# Patient Record
Sex: FEMALE | Race: BLACK OR AFRICAN AMERICAN | NOT HISPANIC OR LATINO | Employment: OTHER | URBAN - METROPOLITAN AREA
[De-identification: names, ages, dates, MRNs, and addresses within clinical notes are randomized per-mention and may not be internally consistent; named-entity substitution may affect disease eponyms.]

---

## 2018-04-09 ENCOUNTER — TELEPHONE (OUTPATIENT)
Dept: SLEEP MEDICINE | Facility: CLINIC | Age: 68
End: 2018-04-09

## 2018-04-09 ENCOUNTER — OFFICE VISIT (OUTPATIENT)
Dept: SLEEP MEDICINE | Facility: CLINIC | Age: 68
End: 2018-04-09
Payer: MEDICARE

## 2018-04-09 ENCOUNTER — OFFICE VISIT (OUTPATIENT)
Dept: PAIN MEDICINE | Facility: CLINIC | Age: 68
End: 2018-04-09
Attending: ANESTHESIOLOGY
Payer: MEDICARE

## 2018-04-09 VITALS
BODY MASS INDEX: 47.09 KG/M2 | DIASTOLIC BLOOD PRESSURE: 83 MMHG | SYSTOLIC BLOOD PRESSURE: 141 MMHG | WEIGHT: 293 LBS | HEART RATE: 81 BPM | HEIGHT: 66 IN | TEMPERATURE: 98 F

## 2018-04-09 VITALS
BODY MASS INDEX: 47.09 KG/M2 | HEIGHT: 66 IN | DIASTOLIC BLOOD PRESSURE: 61 MMHG | HEART RATE: 77 BPM | WEIGHT: 293 LBS | SYSTOLIC BLOOD PRESSURE: 110 MMHG

## 2018-04-09 DIAGNOSIS — G89.4 CHRONIC PAIN DISORDER: ICD-10-CM

## 2018-04-09 DIAGNOSIS — F17.200 CURRENT EVERY DAY SMOKER: ICD-10-CM

## 2018-04-09 DIAGNOSIS — G47.33 OBSTRUCTIVE SLEEP APNEA: Primary | ICD-10-CM

## 2018-04-09 DIAGNOSIS — M25.562 CHRONIC PAIN OF LEFT KNEE: Primary | ICD-10-CM

## 2018-04-09 DIAGNOSIS — F11.20 CHRONIC NARCOTIC DEPENDENCE: ICD-10-CM

## 2018-04-09 DIAGNOSIS — E66.01 MORBID OBESITY WITH BMI OF 50.0-59.9, ADULT: ICD-10-CM

## 2018-04-09 DIAGNOSIS — G89.29 CHRONIC PAIN OF LEFT KNEE: Primary | ICD-10-CM

## 2018-04-09 PROCEDURE — 99203 OFFICE O/P NEW LOW 30 MIN: CPT | Mod: S$GLB,,, | Performed by: NURSE PRACTITIONER

## 2018-04-09 PROCEDURE — 99204 OFFICE O/P NEW MOD 45 MIN: CPT | Mod: S$GLB,,, | Performed by: ANESTHESIOLOGY

## 2018-04-09 PROCEDURE — 99999 PR PBB SHADOW E&M-EST. PATIENT-LVL III: CPT | Mod: PBBFAC,,, | Performed by: ANESTHESIOLOGY

## 2018-04-09 PROCEDURE — 99999 PR PBB SHADOW E&M-NEW PATIENT-LVL III: CPT | Mod: PBBFAC,,, | Performed by: NURSE PRACTITIONER

## 2018-04-09 RX ORDER — AMLODIPINE BESYLATE 5 MG/1
10 TABLET ORAL DAILY
COMMUNITY
Start: 2018-03-26 | End: 2019-12-10

## 2018-04-09 RX ORDER — DICLOFENAC SODIUM 75 MG/1
TABLET, DELAYED RELEASE ORAL
COMMUNITY
Start: 2018-02-14 | End: 2018-05-31 | Stop reason: ALTCHOICE

## 2018-04-09 RX ORDER — GABAPENTIN 300 MG/1
300 CAPSULE ORAL 2 TIMES DAILY
COMMUNITY
Start: 2018-03-27 | End: 2019-09-04 | Stop reason: SDUPTHER

## 2018-04-09 RX ORDER — LISINOPRIL 40 MG/1
40 TABLET ORAL DAILY
COMMUNITY
Start: 2018-03-26 | End: 2019-12-10 | Stop reason: SDUPTHER

## 2018-04-09 RX ORDER — OXYCODONE AND ACETAMINOPHEN 5; 325 MG/1; MG/1
TABLET ORAL
COMMUNITY
Start: 2018-03-08 | End: 2018-12-10 | Stop reason: SDUPTHER

## 2018-04-09 RX ORDER — B-COMPLEX WITH VITAMIN C
1 TABLET ORAL DAILY
COMMUNITY
Start: 2018-02-28

## 2018-04-09 RX ORDER — OMEPRAZOLE 40 MG/1
40 CAPSULE, DELAYED RELEASE ORAL
COMMUNITY
Start: 2018-02-21 | End: 2019-04-29 | Stop reason: ALTCHOICE

## 2018-04-09 RX ORDER — SODIUM, POTASSIUM,MAG SULFATES 17.5-3.13G
SOLUTION, RECONSTITUTED, ORAL ORAL
COMMUNITY
Start: 2018-02-23 | End: 2018-05-31

## 2018-04-09 NOTE — LETTER
April 9, 2018      Shai Jacobo, PADMINI  [address]  [address]  [facility] [address]           [facility]  [address]  [facility] [address]  Phone: [phone]  Fax: [phone]          Patient: Ca Coats   MR Number: [id number]   YOB: 1950   Date of Visit: 4/9/2018       Dear Shai Jacobo:    Thank you for referring Ca Coats to me for evaluation. Attached you will find relevant portions of my assessment and plan of care.    If you have questions, please do not hesitate to call me. I look forward to following Ca Coats along with you.    Sincerely,    Samantha Vidal MD    Enclosure  CC:  No Recipients    If you would like to receive this communication electronically, please contact [email or web address] or [phone] to request more information on [email or web address] Link access.    For providers and/or their staff who would like to refer a patient to [facility], please contact us through our one-stop-shop provider referral line, [facility], at [phone].    If you feel you have received this communication in error or would no longer like to receive these types of communications, please e-mail [email or web address]

## 2018-04-09 NOTE — LETTER
April 9, 2018      Hannah Meier MD  [address]  5th [address]  [facility] [address]           [facility]  [address]  [facility] [address]  Phone: [phone]          Patient: Ca Coats   MR Number: [id number]   YOB: 1950   Date of Visit: 4/9/2018       Dear Dr. Hannah Meier:    Thank you for referring Ca Coats to me for evaluation. Attached you will find relevant portions of my assessment and plan of care.    If you have questions, please do not hesitate to call me. I look forward to following Ca Coats along with you.    Sincerely,    Shai Jacobo, PADMINI    Enclosure  CC:  No Recipients    If you would like to receive this communication electronically, please contact [email or web address] or [phone] to request more information on [employer] Link access.    For providers and/or their staff who would like to refer a patient to [facility], please contact us through our one-stop-shop provider referral line, [facility], at [phone].    If you feel you have received this communication in error or would no longer like to receive these types of communications, please e-mail [email or web address]

## 2018-04-09 NOTE — PROGRESS NOTES
"Ca Coats  was seen as a new patient at the request of  Hannah Meier MD for the management of obstructive sleep apnea.    CHIEF COMPLAINT:    Chief Complaint   Patient presents with    Sleep Apnea       04/09/2018 PEGGY Jacobo NP: Initial HISTORY OF PRESENT ILLNESS: Ca Coats is a 67 y.o. female is here for sleep evaluation.       Previous [facility] patient. Here to establish care and CPAP compliance check after set up of new machine January 2018  No sleep records for my review today    Patient complaints include: interrupted sleep and snoring, resolved with CPAP use. Although still having a little of sleep interruption.   Nocturia x1   Some ongoing fatigue, with known anemia being managed by PCP, due for f/u 4/18/18    Takes Neurontin for chronic leg pain    CPAP Interrogation: Resmed [id number], CPAP 13 cm, Days Used: 30/30, Days > 4 hours: 29/30, Average Usage: 5.8 hours, Used Hours: 174.3, Leak 12L/min, Predicted AHI: 0.2, Machine condition: good     Denies oral/nasal drying with Resmed AirFit F20. Humidity 8/8. Reports dry lips with CPAP use.   Denies pressure intolerance    Denies symptoms of restless legs or kicking during sleep.    Occupation: retired     Muir Sleepiness Scale score during initial sleep evaluation was 15.    SLEEP ROUTINE:      Bed partner:  none  Time to bed:  10-10:30 am  Sleep onset latency: "long time"     Disruptions or awakenings: 3 or more     Wakeup time:   6 am  Perceived sleep quality:  poor        PAST MEDICAL HISTORY:    Active Ambulatory Problems     Diagnosis Date Noted    No Active Ambulatory Problems     Resolved Ambulatory Problems     Diagnosis Date Noted    No Resolved Ambulatory Problems     Past Medical History:   Diagnosis Date    GERD (gastroesophageal reflux disease)     Hypertension                 PAST SURGICAL HISTORY:    Past Surgical History:   Procedure Laterality Date    APPENDECTOMY      cataract surgery   2017 " "        FAMILY HISTORY:                Family History   Problem Relation Age of Onset    No Known Problems Mother     No Known Problems Father        SOCIAL HISTORY:          Tobacco:   History   Smoking Status    Current Every Day Smoker    Types: Cigarettes   Smokeless Tobacco    Never Used       Alcohol use:    History   Alcohol Use    Yes     Comment: rarely                  ALLERGIES:  Review of patient's allergies indicates:  No Known Allergies    CURRENT MEDICATIONS:    Current Outpatient Prescriptions   Medication Sig Dispense Refill    amLODIPine (NORVASC) 5 MG tablet       B-complex with vitamin C (Z-BEC OR EQUIV) tablet       diclofenac (VOLTAREN) 75 MG EC tablet       gabapentin (NEURONTIN) 300 MG capsule       lisinopril (PRINIVIL,ZESTRIL) 40 MG tablet       multivitamin with minerals tablet       omeprazole (PRILOSEC) 40 MG capsule       oxyCODONE-acetaminophen (PERCOCET) 5-325 mg per tablet       SUPREP BOWEL PREP KIT 17.5-3.13-1.6 gram SolR        No current facility-administered medications for this visit.                   REVIEW OF SYSTEMS:     Sleep related symptoms as per HPI.  CONST:Denies weight gain    HEENT: Denies sinus congestion  PULM: Sometimes dyspnea  CARD:  Denies palpitations   GI:  Reports acid reflux  : Denies polyuria  NEURO: Denies headaches  PSYCH: Sometimes mood disturbance  HEME: Denies anemia    Otherwise, a balance of systems reviewed is negative.          PHYSICAL EXAM:  Vitals:    04/09/18 0829   BP: 110/61   Pulse: 77   Weight: (!) 159.8 kg (352 lb 4.7 oz)   Height: 5' 6" (1.676 m)   PainSc: 10-Worst pain ever   PainLoc: Knee     Body mass index is 56.86 kg/m².     GENERAL: Obese body habitus, well groomed  NECK: Supple. No thyromegaly. No palpable nodes.    SKIN: On face and neck: No abrasions, no rashes, no lesions.  No subcutaneous nodules are palpable.  RESPIRATORY:  Normal chest expansion and non-labored breathing at rest.  CARDIOVASCULAR: Normal " rate  EXTREMITIES: No edema. No clubbing. No cyanosis. Station normal. Gait normal.        NEURO/PSYCH: Oriented to time, place and person. Normal attention span and concentration. Affect is full. Mood is normal.                                              ASSESSMENT:    Obstructive sleep apnea, previously diagnosed, severity unknown. The patient symptomatically has snoring and interrupted sleep resolved with CPAP. The patient is adherent on CPAP and experiencing symptomatic benefit. Medical co-morbidities: systemic HTN, GERD, and obesity.  This warrants treatment for obstructive sleep apnea.      Fatigue, currently on iron supplements for known anemia managed by [facility] PCP    Current every day smoker, currently smoking up to 4 cigarettes daily which is decreased from prior never more than a whole pack    PLAN:    Treatment:    -GEO signed to get [facility] sleep records  -Continue CPAP 13 cm. Rx for heated coils to [facility]  -Referral to Smoking Cessation Program    Education: During our discussion today, we talked about the etiology of obstructive sleep apnea as well as the potential ramifications of untreated sleep apnea, which could include daytime sleepiness, hypertension, heart disease and/or stroke. We discussed potential treatment options, which could include weight loss, body positioning, continuous positive airway pressure (CPAP), OA, EPAP, or referral for surgical consideration.      Precautions: The patient was advised to abstain from driving should they feel sleepy  or drowsy - pt does not drive     Thank you for allowing me the opportunity to participate in the care of your patient.    ADDENDUM: Received external sleep records 04/09/2018 at 1:47, handed to me by Vandana Kenny MA  02/01/2013 PSG AHI 9.2 with oxygen sathish 84%.   03/29/2013 Titratrion study CPAP 13 cm

## 2018-04-09 NOTE — PROGRESS NOTES
"Subjective:     Patient ID: Ca Coats is a 67 y.o. female.    Chief Complaint: Pain    Consulted by: Self      Disclaimer: This note was generated using voice recognition software.  There may be a typographical errors that were missed during proofreading.      HPI:    Ca Coats is a 67 y.o. female who presents today with left knee pain. Her pain has been going on for "too long."  She was previously treated by Dr. Iyer at [facility].  She has injections [id number] with him.  These were helpful, but she does not know if the injections were steroid or viscosupplementation.   This pain is described in detail below.    Physical Therapy: Yes, she did this in 2018 for one month (twice a week). She does HEP (stretching and ROM in the morning)    Non-pharmacologic Treatment:     · Ice/Heat: Heat is more helpful than ice  · TENS: Not tried  · Massage: Not tried  · Chiropractic care: Not tried  · Acupuncture: Not tried  · Other: Uses a cane         Pain Medications:         · Currently taking: Gabapentin 300 mg/600 mg, Diclofenac 75 mg BID PRN, Percocet 5/325 mg BID PRN    · Has tried in the past:  Ibuprofen (still takes sometimes), Voltaren gel    · Has not tried: Tylenol, Muscle relaxants, TCAs, SNRIs, Lyrica, compound topical creams    Blood thinners: None    Interventional Therapies: [id number] injections by Dr. Iyer were helpful    Relevant Surgeries: None    Affecting sleep? No    Affecting daily activities? Yes    Depressive symptoms? No          · SI/HI? No    Work status: No, on disability due to her knee    Prescription Monitoring Program database:  Reviewed and consistent with medication use as prescribed.    Pain Scales  Best: 3/10  Worst: 10/10  Usually: 3/10  Today: 8/10    Knee Pain    The pain is present in the left knee. This is a chronic problem. The current episode started more than 1 year ago. The problem occurs daily. The problem has been gradually worsening. The quality of " the pain is described as aching, burning, sharp, tight and numb. The pain is at a severity of 8/10. Associated symptoms include an inability to bear weight. The symptoms are aggravated by walking and standing. She has tried injection treatment, exercise, cold, heat, movement and oral narcotics for the symptoms. The treatment provided mild relief. Physical therapy was effective.      Last 3 PDI Scores 4/9/2018   Pain Disability Index (PDI) 34   ]    Review of Systems   Musculoskeletal: Positive for joint pain.        Left knee pain             Past Medical History:   Diagnosis Date    GERD (gastroesophageal reflux disease)     Hypertension        Past Surgical History:   Procedure Laterality Date    APPENDECTOMY      cataract surgery   2017       Review of patient's allergies indicates:  No Known Allergies    Current Outpatient Prescriptions   Medication Sig Dispense Refill    amLODIPine (NORVASC) 5 MG tablet       B-complex with vitamin C (Z-BEC OR EQUIV) tablet       diclofenac (VOLTAREN) 75 MG EC tablet       gabapentin (NEURONTIN) 300 MG capsule       lisinopril (PRINIVIL,ZESTRIL) 40 MG tablet       multivitamin with minerals tablet       omeprazole (PRILOSEC) 40 MG capsule       oxyCODONE-acetaminophen (PERCOCET) 5-325 mg per tablet       SUPREP BOWEL PREP KIT 17.5-3.13-1.6 gram SolR        No current facility-administered medications for this visit.        Family History   Problem Relation Age of Onset    No Known Problems Mother     No Known Problems Father        Social History     Social History    Marital status: Single     Spouse name: N/A    Number of children: N/A    Years of education: N/A     Occupational History    Not on file.     Social History Main Topics    Smoking status: Current Every Day Smoker     Types: Cigarettes    Smokeless tobacco: Never Used    Alcohol use Yes      Comment: rarely     Drug use: Unknown    Sexual activity: Not on file     Other Topics Concern     "Not on file     Social History Narrative    No narrative on file       Objective:     Vitals:    04/09/18 0940   BP: (!) 141/83   Pulse: 81   Temp: 97.7 °F (36.5 °C)   Weight: (!) 159.6 kg (351 lb 13.7 oz)   Height: 5' 6" (1.676 m)   PainSc:   8       GEN:  Well developed, well nourished.  No acute distress. No pain behavior.  HEENT:  No trauma.  Mucous membranes moist.  Nares patent bilaterally.  PSYCH: Normal affect. Thought content appropriate.  CHEST:  Breathing symmetric.  No audible wheezing.  ABD:   · Soft, non-distended.    SKIN:  Warm, pink, dry.  No rash on exposed areas.    EXT:  No cyanosis, clubbing, or edema.  No color change or changes in nail or hair growth.  NEURO/MUSCULOSKELETAL:  Fully alert, oriented, and appropriate. Speech normal francesca. No cranial nerve deficits.   Gait: Normal.     Motor Strength: 5/5 motor strength throughout lower extremities.     Right knee  Inspection: No erythema, swelling, or redness  ROM: Full  Palpation: No pes anserine tenderness and No crepitus. No patellar tendon tenderness and No patellofemoral tendon tenderness.  No instability on exam.    Left knee  Inspection: No erythema, swelling, or redness  ROM: Full  Palpation: Positive pes anserine tenderness and Positive crepitus. Positive patellar tendon tenderness and Positive patellofemoral tendon tenderness.  N instability on exam.      Imaging:      No relevant imaging available for review      Assessment:     Encounter Diagnoses   Name Primary?    Chronic pain of left knee Yes    Chronic pain disorder     Morbid obesity with BMI of 50.0-59.9, adult     Chronic narcotic dependence        Plan:     Ca was seen today for knee pain.    Diagnoses and all orders for this visit:    Chronic pain of left knee    Chronic pain disorder    Morbid obesity with BMI of 50.0-59.9, adult    Chronic narcotic dependence       Her pain is consistent with the above.    We discussed the assessment and recommendations.  All " available images were reviewed. We discussed the disease process, prognosis, treatment plan, and risks and benefits. The patient is aware of the risks and benefits of the medications being prescribed, common side effects, and proper usage. The following is the plan we agreed on:     1. GEO from [facility] for MRI and previous procedure notes  2. Will plan to repeat the injection that Dr. Iyer did as a first step if there is no ligamentous injury on MRI  1. Can consider RFA  3. Will reevaluate medication regimen at [facility].  Until then, prescriptive authority of all medications should remain with her PCP.  4. RTC in 2 weeks or sooner if needed.    Thank you for allowing me to participate in the care of this patient.   Please do not hesitate to call me at [phone] with any questions or concerns.    Greater than 25 minutes spent in total in todays visit with the patient, with more than half that time direct face to face counseling and education with the patient today. We discussed the disease process, prognosis, treatment plan, and risks and benefits.    The above plan and management options were discussed at length with patient. Patient is in agreement with the above and verbalized understanding. It will be communicated with the referring physician via electronic record, fax, or mail.

## 2018-04-30 ENCOUNTER — CLINICAL SUPPORT (OUTPATIENT)
Dept: SMOKING CESSATION | Facility: CLINIC | Age: 68
End: 2018-04-30
Payer: COMMERCIAL

## 2018-04-30 ENCOUNTER — OFFICE VISIT (OUTPATIENT)
Dept: PAIN MEDICINE | Facility: CLINIC | Age: 68
End: 2018-04-30
Payer: MEDICARE

## 2018-04-30 VITALS
SYSTOLIC BLOOD PRESSURE: 155 MMHG | DIASTOLIC BLOOD PRESSURE: 79 MMHG | RESPIRATION RATE: 18 BRPM | TEMPERATURE: 99 F | BODY MASS INDEX: 47.09 KG/M2 | WEIGHT: 293 LBS | HEIGHT: 66 IN | HEART RATE: 91 BPM

## 2018-04-30 DIAGNOSIS — S83.512A RUPTURE OF ANTERIOR CRUCIATE LIGAMENT OF LEFT KNEE, INITIAL ENCOUNTER: ICD-10-CM

## 2018-04-30 DIAGNOSIS — M17.12 PRIMARY OSTEOARTHRITIS OF LEFT KNEE: ICD-10-CM

## 2018-04-30 DIAGNOSIS — M25.562 CHRONIC PAIN OF LEFT KNEE: Primary | ICD-10-CM

## 2018-04-30 DIAGNOSIS — F17.210 SMOKING 1/2 PACK A DAY OR LESS: Primary | ICD-10-CM

## 2018-04-30 DIAGNOSIS — G89.29 CHRONIC PAIN OF LEFT KNEE: Primary | ICD-10-CM

## 2018-04-30 PROCEDURE — 99404 PREV MED CNSL INDIV APPRX 60: CPT | Mod: S$GLB,,,

## 2018-04-30 PROCEDURE — 99213 OFFICE O/P EST LOW 20 MIN: CPT | Mod: S$GLB,,, | Performed by: NURSE PRACTITIONER

## 2018-04-30 PROCEDURE — 99999 PR PBB SHADOW E&M-EST. PATIENT-LVL I: CPT | Mod: PBBFAC,,,

## 2018-04-30 PROCEDURE — 99999 PR PBB SHADOW E&M-EST. PATIENT-LVL IV: CPT | Mod: PBBFAC,,, | Performed by: NURSE PRACTITIONER

## 2018-04-30 RX ORDER — IBUPROFEN 200 MG
1 TABLET ORAL DAILY
Refills: 0 | COMMUNITY
Start: 2018-04-30 | End: 2018-04-30 | Stop reason: SDUPTHER

## 2018-04-30 RX ORDER — IBUPROFEN 200 MG
1 TABLET ORAL DAILY
Qty: 14 PATCH | Refills: 0 | Status: SHIPPED | OUTPATIENT
Start: 2018-04-30 | End: 2018-05-10 | Stop reason: SDUPTHER

## 2018-04-30 NOTE — PROGRESS NOTES
"Subjective:     Patient ID: Ca Coats is a 67 y.o. female.    Chief Complaint: Pain    Consulted by: Self      Disclaimer: This note was generated using voice recognition software.  There may be a typographical errors that were missed during proofreading.      HPI:    Ca Coats is a 67 y.o. female who presents today with left knee pain. Her pain has been going on for "too long."  She was previously treated by Dr. Iyer at [facility].  She has injections [id number] with him.  These were helpful, but she does not know if the injections were steroid or viscosupplementation.   This pain is described in detail below.    Interval History (4/30/2018):  The patient returns to clinic today for follow up and records review. We did received records from [facility] including a MRI of her knee. Dr. Iyer's last office visit note from January 2017 does not include any previous injections. She continues to report bilateral knee pain, left greater than right. She describes this pain as constant and aching. This pain is worse with prolonged walking. She also report right shoulder pain with prolonged overhead activity. She continues to take Percocet with benefit. She denies any other health changes.     Physical Therapy: Yes, she did this in 2018 for one month (twice a week). She does HEP (stretching and ROM in the morning)    Non-pharmacologic Treatment:     · Ice/Heat: Heat is more helpful than ice  · TENS: Not tried  · Massage: Not tried  · Chiropractic care: Not tried  · Acupuncture: Not tried  · Other: Uses a cane         Pain Medications:         · Currently taking: Gabapentin 300 mg/600 mg, Diclofenac 75 mg BID PRN, Percocet 5/325 mg BID PRN    · Has tried in the past:  Ibuprofen (still takes sometimes), Voltaren gel    · Has not tried: Tylenol, Muscle relaxants, TCAs, SNRIs, Lyrica, compound topical creams    Blood thinners: None    Interventional Therapies: [id number] injections by Dr. Iyer " were helpful    Relevant Surgeries: None    Affecting sleep? No    Affecting daily activities? Yes    Depressive symptoms? No          · SI/HI? No    Work status: No, on disability due to her knee    Prescription Monitoring Program database:  Reviewed and consistent with medication use as prescribed.    Pain Scales  Best: 3/10  Worst: 10/10  Usually: 3/10  Today: 7/10    Knee Pain    The pain is present in the left knee. This is a chronic problem. The current episode started more than 1 year ago. The problem occurs daily. The problem has been gradually worsening. The quality of the pain is described as aching, burning, sharp, tight and numb. The pain is at a severity of 8/10. Associated symptoms include an inability to bear weight. The symptoms are aggravated by walking and standing. She has tried injection treatment, exercise, cold, heat, movement and oral narcotics for the symptoms. The treatment provided mild relief. Physical therapy was effective.      Last 3 PDI Scores 4/23/2018 4/9/2018   Pain Disability Index (PDI) 0 34   ]    Review of Systems   Musculoskeletal: Positive for joint pain.        Left knee pain             Past Medical History:   Diagnosis Date    GERD (gastroesophageal reflux disease)     Hypertension        Past Surgical History:   Procedure Laterality Date    APPENDECTOMY      cataract surgery   2017       Review of patient's allergies indicates:  No Known Allergies    Current Outpatient Prescriptions   Medication Sig Dispense Refill    amLODIPine (NORVASC) 5 MG tablet       B-complex with vitamin C (Z-BEC OR EQUIV) tablet       diclofenac (VOLTAREN) 75 MG EC tablet       gabapentin (NEURONTIN) 300 MG capsule       lisinopril (PRINIVIL,ZESTRIL) 40 MG tablet       multivitamin with minerals tablet       omeprazole (PRILOSEC) 40 MG capsule       oxyCODONE-acetaminophen (PERCOCET) 5-325 mg per tablet       SUPREP BOWEL PREP KIT 17.5-3.13-1.6 gram SolR        No current  "facility-administered medications for this visit.        Family History   Problem Relation Age of Onset    No Known Problems Mother     No Known Problems Father        Social History     Social History    Marital status: Single     Spouse name: N/A    Number of children: N/A    Years of education: N/A     Occupational History    Not on file.     Social History Main Topics    Smoking status: Current Every Day Smoker     Types: Cigarettes    Smokeless tobacco: Never Used    Alcohol use Yes      Comment: rarely     Drug use: Unknown    Sexual activity: Not on file     Other Topics Concern    Not on file     Social History Narrative    No narrative on file       Objective:     Vitals:    04/30/18 0954   BP: (!) 155/79   Pulse: 91   Resp: 18   Temp: 98.8 °F (37.1 °C)   Weight: (!) 160 kg (352 lb 11.8 oz)   Height: 5' 6" (1.676 m)   PainSc:   7   PainLoc: Shoulder       GEN:  Well developed, well nourished.  No acute distress. No pain behavior.  HEENT:  No trauma.  Mucous membranes moist.  Nares patent bilaterally.  PSYCH: Normal affect. Thought content appropriate.  CHEST:  Breathing symmetric.  No audible wheezing.  ABD:   · Soft, non-distended.    SKIN:  Warm, pink, dry.  No rash on exposed areas.    EXT:  No cyanosis, clubbing, or edema.  No color change or changes in nail or hair growth.  NEURO/MUSCULOSKELETAL:  Fully alert, oriented, and appropriate. Speech normal francesca. No cranial nerve deficits.   Gait: Normal.     Motor Strength: 5/5 motor strength throughout lower extremities.     Right knee  Inspection: No erythema, swelling, or redness  ROM: Full with pain on extension.   Palpation: No pes anserine tenderness and No crepitus. No patellar tendon tenderness and No patellofemoral tendon tenderness.  No instability on exam.    Left knee  Inspection: No erythema, swelling, or redness  ROM: Full with pain on extension.   Palpation: Positive pes anserine tenderness and Positive crepitus. Positive " patellar tendon tenderness and Positive patellofemoral tendon tenderness.  No instability on exam.      Imaging:      MRI Left Knee 7/21/2017 (in media):  The medial meniscus is intact. The lateral meniscus is intact.     A chronic complete ACL tear is noted. The posterior cruciate ligament is intact. The medial and lateral retinacula are intact. The medial collateral ligament is intact. The lateral collateral ligament in intact. The posterolateral corner structures are intact.     There is full thickness fissuring of the central aspect medial femoral cartilage. The lateral tibiofemoral compartment cartilage is intact. There is broad thickness defect within the central trochlear cartilage. There is mild lateral patellofemoral cartilage thinning and regional full thickness loss of the central superior patellar cartilage.     A small effusion is present. There is trace infrapatellar fluid. Tiny popliteal cyst is noted. Subtle focal marrow edema is seen within the posterior tibial plateau. The bone marrow signal is heterogeneous likely reflecting marrow reconversion.     There is trace pes anserine bursitis. The tendons are otherwise normal.     Grade 2 fatty streaking of the semimembranosus and vastus lateralis muscles are noted. There is mild prepatellar edema.     MRI Right Shoulder 7/21/2017 (in media):   There is a complete tear of the supraspinatus tendon with retraction to the glenohumeral joint. There is partial tearing of the anterior infraspinatus articular surface. Mild subscapularis tendinosis is noted. The teres minor is intact. A small amount of subacromial/subdeltoid fluid is noted. The proximal long head biceps tendon is poorly visualized proximally although intact distally.     Degenerative tearing of the anterosuperior through posterosuperior labrum is noted. There is moderate to severe superior glenoid cartilage thinning with subchondral degenerative changes. The glenohumeral ligaments appear grossly  intact.     An os acromiale is seen with fluid and osteophytosis at its junction with the base of the acromion. There is moderate superior subluxation of the AC joint with mild osteophytosis.     Small effusion with subcoracoid extension is noted.     There is grade 2/3 fatty atrophy of the supraspinatus muscle, grade 2 of the infraspinatus and subscapularis. Heterogeneous signal is seen throughout the bone marrow likely reflecting marrow reconversion.       Assessment:     Encounter Diagnoses   Name Primary?    Chronic pain of left knee Yes    Primary osteoarthritis of left knee     Rupture of anterior cruciate ligament of left knee, initial encounter        Plan:     Ca was seen today for shoulder pain.    Diagnoses and all orders for this visit:    Chronic pain of left knee  -     Ambulatory consult to Orthopedics    Primary osteoarthritis of left knee  -     Ambulatory consult to Orthopedics    Rupture of anterior cruciate ligament of left knee, initial encounter  -     Ambulatory consult to Orthopedics       Her pain is consistent with the above.    We discussed the assessment and recommendations.  All available images were reviewed. We discussed the disease process, prognosis, treatment plan, and risks and benefits. The patient is aware of the risks and benefits of the medications being prescribed, common side effects, and proper usage. The following is the plan we agreed on:     1. Schedule for left knee Synvisc One injections. The procedure, risks, benefits and options were discussed with patient. There are no contraindications to the procedure. The patient expressed understanding and agreed to proceed.    2. Consult orthopedics to discuss ACL tear.   3. Will reevaluate medication regimen at [facility].  Until then, prescriptive authority of all medications should remain with her PCP.  4. RTC in 2 weeks or sooner if needed.    - Dr. Vidal was consulted on the patient and agrees with this plan.    The above  plan and management options were discussed at length with patient. Patient is in agreement with the above and verbalized understanding.     Savanna Hyatt NP  04/30/2018

## 2018-04-30 NOTE — PROGRESS NOTES
Patient here for 1st quit attempt. States she smokes 6-7 cigarettes daily. Reviewed program goals. Due to limited transportation she agreed to receive call and schedule follow up appointments with scheduled clinic visits. Will begin the prescribed tobacco cessation medication regime of 14 mg nicotine patch. Educated patient how to use and side effects of medications.

## 2018-05-08 ENCOUNTER — SURGERY (OUTPATIENT)
Age: 68
End: 2018-05-08

## 2018-05-08 ENCOUNTER — HOSPITAL ENCOUNTER (OUTPATIENT)
Facility: OTHER | Age: 68
Discharge: HOME | End: 2018-05-08
Attending: ANESTHESIOLOGY | Admitting: ANESTHESIOLOGY
Payer: MEDICARE

## 2018-05-08 VITALS
HEIGHT: 66 IN | RESPIRATION RATE: 18 BRPM | DIASTOLIC BLOOD PRESSURE: 60 MMHG | BODY MASS INDEX: 47.09 KG/M2 | SYSTOLIC BLOOD PRESSURE: 105 MMHG | TEMPERATURE: 98 F | WEIGHT: 293 LBS | OXYGEN SATURATION: 95 % | HEART RATE: 76 BPM

## 2018-05-08 DIAGNOSIS — M17.12 LOCALIZED OSTEOARTHRITIS OF LEFT KNEE: Primary | ICD-10-CM

## 2018-05-08 DIAGNOSIS — G89.29 CHRONIC PAIN: ICD-10-CM

## 2018-05-08 PROCEDURE — 77002 NEEDLE LOCALIZATION BY XRAY: CPT | Performed by: ANESTHESIOLOGY

## 2018-05-08 PROCEDURE — 25000003 PHARM REV CODE 250: Performed by: ANESTHESIOLOGY

## 2018-05-08 PROCEDURE — 25500020 PHARM REV CODE 255: Performed by: ANESTHESIOLOGY

## 2018-05-08 PROCEDURE — 63600175 PHARM REV CODE 636 W HCPCS: Performed by: ANESTHESIOLOGY

## 2018-05-08 PROCEDURE — S0020 INJECTION, BUPIVICAINE HYDRO: HCPCS | Performed by: ANESTHESIOLOGY

## 2018-05-08 PROCEDURE — 77002 NEEDLE LOCALIZATION BY XRAY: CPT | Mod: 26,,, | Performed by: ANESTHESIOLOGY

## 2018-05-08 PROCEDURE — 20610 DRAIN/INJ JOINT/BURSA W/O US: CPT | Mod: LT,,, | Performed by: ANESTHESIOLOGY

## 2018-05-08 PROCEDURE — 20610 DRAIN/INJ JOINT/BURSA W/O US: CPT | Mod: LT | Performed by: ANESTHESIOLOGY

## 2018-05-08 RX ORDER — METHYLPREDNISOLONE ACETATE 40 MG/ML
INJECTION, SUSPENSION INTRA-ARTICULAR; INTRALESIONAL; INTRAMUSCULAR; SOFT TISSUE
Status: DISCONTINUED | OUTPATIENT
Start: 2018-05-08 | End: 2018-05-08 | Stop reason: HOSPADM

## 2018-05-08 RX ORDER — BUPIVACAINE HYDROCHLORIDE 5 MG/ML
INJECTION, SOLUTION EPIDURAL; INTRACAUDAL
Status: DISCONTINUED | OUTPATIENT
Start: 2018-05-08 | End: 2018-05-08 | Stop reason: HOSPADM

## 2018-05-08 RX ORDER — SODIUM CHLORIDE 9 MG/ML
500 INJECTION, SOLUTION INTRAVENOUS CONTINUOUS
Status: DISCONTINUED | OUTPATIENT
Start: 2018-05-08 | End: 2018-05-08 | Stop reason: HOSPADM

## 2018-05-08 RX ORDER — LIDOCAINE HYDROCHLORIDE 10 MG/ML
INJECTION INFILTRATION; PERINEURAL
Status: DISCONTINUED | OUTPATIENT
Start: 2018-05-08 | End: 2018-05-08 | Stop reason: HOSPADM

## 2018-05-08 RX ADMIN — METHYLPREDNISOLONE ACETATE 40 MG: 40 INJECTION, SUSPENSION INTRA-ARTICULAR; INTRALESIONAL; INTRAMUSCULAR; SOFT TISSUE at 03:05

## 2018-05-08 RX ADMIN — BUPIVACAINE HYDROCHLORIDE 10 ML: 5 INJECTION, SOLUTION EPIDURAL; INTRACAUDAL; PERINEURAL at 03:05

## 2018-05-08 RX ADMIN — IOHEXOL 5 ML: 300 INJECTION, SOLUTION INTRAVENOUS at 03:05

## 2018-05-08 RX ADMIN — LIDOCAINE HYDROCHLORIDE 10 ML: 10 INJECTION, SOLUTION INFILTRATION; PERINEURAL at 03:05

## 2018-05-08 NOTE — DISCHARGE INSTRUCTIONS
Thank you for allowing us to care for you today. You may receive a survey about the care we provided. Your feedback is valuable and helps us provide excellent care throughout the community. Home Care Instructions Pain Management:    1. DIET:   You may resume your normal diet today.   2. BATHING:   You may shower with luke warm water.  3. DRESSING:   You may remove your bandage today.   4. ACTIVITY LEVEL:   You may resume your normal activities 24 hrs after your procedure.  5. MEDICATIONS:   You may resume your normal medications today.   6. SPECIAL INSTRUCTIONS:   No heat to the injection site for 24 hrs including, bath or shower, heating pad, moist heat, or hot tubs.    Use ice pack to injection site for any pain or discomfort.  Apply ice packs for 20 minute intervals as needed.   If you have received any sedatives by mouth today you may not drive for 12 hours.    If you have received any sedation through your IV, you may not drive for 24 hrs.     PLEASE CALL YOUR DOCTOR IF:  1. Redness or swelling around the injection site.  2. Fever of 101 degrees  3. Drainage (pus) from the injection site.  4. For any continuous bleeding (some dried blood over the incision is normal.)    FOR EMERGENCIES:   If any unusual problems or difficulties occur during clinic hours, call [phone] or [phone].

## 2018-05-10 ENCOUNTER — CLINICAL SUPPORT (OUTPATIENT)
Dept: SMOKING CESSATION | Facility: CLINIC | Age: 68
End: 2018-05-10
Payer: COMMERCIAL

## 2018-05-10 DIAGNOSIS — F17.210 SMOKING 1/2 PACK A DAY OR LESS: ICD-10-CM

## 2018-05-10 PROCEDURE — 99407 BEHAV CHNG SMOKING > 10 MIN: CPT | Mod: S$GLB,,,

## 2018-05-10 PROCEDURE — 99999 PR PBB SHADOW E&M-EST. PATIENT-LVL I: CPT | Mod: PBBFAC,,,

## 2018-05-10 RX ORDER — IBUPROFEN 200 MG
1 TABLET ORAL DAILY
Qty: 14 PATCH | Refills: 0 | Status: SHIPPED | OUTPATIENT
Start: 2018-05-10 | End: 2018-05-31 | Stop reason: SDUPTHER

## 2018-05-10 NOTE — PROGRESS NOTES
Successful contact at [phone]. Following up on patient quit status. She states smoking 5 per day. Has reduced from 6-7 per day. Reviewed triggers,habits and strategies. Discussed learned addiction model and importance of setting quit date. Rate faded to 3 per day. She was hesitant of reducing because she is going to go out this weekend. Reviewed strategies to help her feel better about reducing. Says she will try. Refilled patches. Denies any abnormal behavioror mental changes at this time.

## 2018-05-17 ENCOUNTER — CLINICAL SUPPORT (OUTPATIENT)
Dept: SMOKING CESSATION | Facility: CLINIC | Age: 68
End: 2018-05-17
Payer: COMMERCIAL

## 2018-05-17 DIAGNOSIS — F17.210 LIGHT CIGARETTE SMOKER (1-9 CIGS/DAY): Primary | ICD-10-CM

## 2018-05-17 PROCEDURE — 99999 PR PBB SHADOW E&M-EST. PATIENT-LVL I: CPT | Mod: PBBFAC,,,

## 2018-05-17 PROCEDURE — 99407 BEHAV CHNG SMOKING > 10 MIN: CPT | Mod: S$GLB,,,

## 2018-05-25 ENCOUNTER — HOSPITAL ENCOUNTER (OUTPATIENT)
Dept: RADIOLOGY | Facility: HOSPITAL | Age: 68
Discharge: HOME | End: 2018-05-25
Attending: ORTHOPAEDIC SURGERY
Payer: MEDICARE

## 2018-05-25 ENCOUNTER — OFFICE VISIT (OUTPATIENT)
Dept: ORTHOPEDICS | Facility: CLINIC | Age: 68
End: 2018-05-25
Payer: MEDICARE

## 2018-05-25 VITALS
DIASTOLIC BLOOD PRESSURE: 63 MMHG | HEIGHT: 66 IN | BODY MASS INDEX: 47.09 KG/M2 | SYSTOLIC BLOOD PRESSURE: 121 MMHG | WEIGHT: 293 LBS | HEART RATE: 75 BPM

## 2018-05-25 DIAGNOSIS — M25.562 LEFT KNEE PAIN, UNSPECIFIED CHRONICITY: ICD-10-CM

## 2018-05-25 DIAGNOSIS — M17.12 ARTHRITIS OF LEFT KNEE: ICD-10-CM

## 2018-05-25 DIAGNOSIS — M25.562 LEFT KNEE PAIN, UNSPECIFIED CHRONICITY: Primary | ICD-10-CM

## 2018-05-25 PROCEDURE — 99999 PR PBB SHADOW E&M-EST. PATIENT-LVL III: CPT | Mod: PBBFAC,,, | Performed by: ORTHOPAEDIC SURGERY

## 2018-05-25 PROCEDURE — 73562 X-RAY EXAM OF KNEE 3: CPT | Mod: 26,XS,RT, | Performed by: RADIOLOGY

## 2018-05-25 PROCEDURE — 3008F BODY MASS INDEX DOCD: CPT | Mod: CPTII,S$GLB,, | Performed by: ORTHOPAEDIC SURGERY

## 2018-05-25 PROCEDURE — 73562 X-RAY EXAM OF KNEE 3: CPT | Mod: 59,TC,RT

## 2018-05-25 PROCEDURE — 73564 X-RAY EXAM KNEE 4 OR MORE: CPT | Mod: 26,LT,, | Performed by: RADIOLOGY

## 2018-05-25 PROCEDURE — 99203 OFFICE O/P NEW LOW 30 MIN: CPT | Mod: S$GLB,,, | Performed by: ORTHOPAEDIC SURGERY

## 2018-05-25 NOTE — PROGRESS NOTES
05/25/2018  Chief Complaint   Patient presents with    Left Knee - Pain        History of Present Illness: Ca Coats is a 67 y.o. year old female patient here for initial evaluation of left knee pain that has been present for greater than 10 years. The patient describes the pain as sharp pain primarily over the anterior aspect of the knee that is worse with weight bearing and walking long distances and improved with pain medicine. She is taking percocet for pain relief. She has had steroid injections in the past and two weeks ago had a Synvisc One injection by pain management at [facility]. There is no associated numbness and tingling. She has also done physical therapy in the past which did not make a difference in her symptoms. She has seen a bariatric surgeon for weight loss options and is planning on having a lap band this year.     Past Medical History:  Active Ambulatory Problems     Diagnosis Date Noted    Chronic pain 05/08/2018     Resolved Ambulatory Problems     Diagnosis Date Noted    No Resolved Ambulatory Problems     Past Medical History:   Diagnosis Date    GERD (gastroesophageal reflux disease)     Hypertension      Past Surgical History:   Procedure Laterality Date    APPENDECTOMY      cataract surgery   2017     Medications:  Entered into EMR  Review of patient's allergies indicates:  No Known Allergies  ROS:   Negative except as stated in HPI.    Social History     Social History    Marital status: Single     Spouse name: N/A    Number of children: N/A    Years of education: N/A     Occupational History    Not on file.     Social History Main Topics    Smoking status: Current Every Day Smoker     Packs/day: 0.25     Years: 50.00     Types: Cigarettes    Smokeless tobacco: Never Used    Alcohol use Yes      Comment: rarely     Drug use: No    Sexual activity: Not on file     Other Topics Concern    Not on file     Social History Narrative    No narrative on file      Family History   Problem Relation Age of Onset    No Known Problems Mother     No Known Problems Father      Physical Examination:  Vital Signs:   Vitals:    05/25/18 0956   BP: 121/63   Pulse: 75     General: Awake, alert, oriented x3. NAD  Psych: Mood and affect normal  HEENT: NC/AT  Cardio: Regular rate  Respiratory: Clear, equal, and unlabored respirations  Extremities:      Examination of lower extremities reveals there is no visible mass or deformity.    Left knee:  ROM 0-110    Ligamentously stable to varus/valgus stress. Pain with valgus stress.    Anterior and posterior drawers negative.    No pain over pes bursa.    No warmth    No erythema    Effusion No    The skin over both lower extremities is normal and unremarkable.  She has a  painless range of motion of the hips and ankles bilaterally.   Sensation is intact in both lower extremities.    There are no motor deficits in the lower extremities bilaterally.   Pedal pulses are palpable distally bilaterally.    She has no calf tenderness to palpation nor edema.    Radiographs:  Radiographs of the left knee were ordered and personally reviewed with the patient today. She has mild degenerative changes of both needs, primarily affecting the medial compartment. There is valgus deformity of the knees.     Impression:  1. Left knee pain, unspecified chronicity  X-ray Knee Ortho Left with Flexion   2. Arthritis of left knee         Left knee osteoarthritis     Discussion/Plan:  Discussed treatment options with pt. She has already had conservative treatment of this knee with physical therapy, steroid injections, and most recently Synvisc injection. Discussed weight loss which could improve her symptoms and would be necessary prior to knee replacement down the road.    I have personally taken the history and examined this patient and agree with the residents note as stated above.

## 2018-05-25 NOTE — LETTER
May 28, 2018      Savanna Hyatt, NP  [address]  [address]  [facility] [address]           [facility]  [address]  [facility] [address]  Phone: [phone]          Patient: Ca Coats   MR Number: [id number]   YOB: 1950   Date of Visit: 5/25/2018       Dear Savanna Hyatt:    Thank you for referring Ca Coats to me for evaluation. Attached you will find relevant portions of my assessment and plan of care.    If you have questions, please do not hesitate to call me. I look forward to following Ca Coats along with you.    Sincerely,    Alcon Ballard MD    Enclosure  CC:  No Recipients    If you would like to receive this communication electronically, please contact [email or web address] or [phone] to request more information on [facility] access.    For providers and/or their staff who would like to refer a patient to [facility], please contact us through our one-stop-shop provider referral line, [facility], at [phone].    If you feel you have received this communication in error or would no longer like to receive these types of communications, please e-mail [email or web address]

## 2018-05-31 ENCOUNTER — OFFICE VISIT (OUTPATIENT)
Dept: PAIN MEDICINE | Facility: CLINIC | Age: 68
End: 2018-05-31
Payer: MEDICARE

## 2018-05-31 ENCOUNTER — CLINICAL SUPPORT (OUTPATIENT)
Dept: SMOKING CESSATION | Facility: CLINIC | Age: 68
End: 2018-05-31
Payer: COMMERCIAL

## 2018-05-31 VITALS
TEMPERATURE: 98 F | WEIGHT: 293 LBS | DIASTOLIC BLOOD PRESSURE: 83 MMHG | HEART RATE: 72 BPM | HEIGHT: 66 IN | BODY MASS INDEX: 47.09 KG/M2 | SYSTOLIC BLOOD PRESSURE: 138 MMHG

## 2018-05-31 DIAGNOSIS — M17.12 PRIMARY OSTEOARTHRITIS OF LEFT KNEE: ICD-10-CM

## 2018-05-31 DIAGNOSIS — F17.210 SMOKING 1/2 PACK A DAY OR LESS: ICD-10-CM

## 2018-05-31 DIAGNOSIS — G89.29 CHRONIC PAIN OF LEFT KNEE: Primary | ICD-10-CM

## 2018-05-31 DIAGNOSIS — G89.4 CHRONIC PAIN DISORDER: ICD-10-CM

## 2018-05-31 DIAGNOSIS — F17.210 LIGHT CIGARETTE SMOKER (1-9 CIGS/DAY): Primary | ICD-10-CM

## 2018-05-31 DIAGNOSIS — M25.562 CHRONIC PAIN OF LEFT KNEE: Primary | ICD-10-CM

## 2018-05-31 PROCEDURE — 3008F BODY MASS INDEX DOCD: CPT | Mod: CPTII,S$GLB,, | Performed by: NURSE PRACTITIONER

## 2018-05-31 PROCEDURE — 99213 OFFICE O/P EST LOW 20 MIN: CPT | Mod: S$GLB,,, | Performed by: NURSE PRACTITIONER

## 2018-05-31 PROCEDURE — 99999 PR PBB SHADOW E&M-EST. PATIENT-LVL I: CPT | Mod: PBBFAC,,,

## 2018-05-31 PROCEDURE — 99402 PREV MED CNSL INDIV APPRX 30: CPT | Mod: S$GLB,,,

## 2018-05-31 PROCEDURE — 99999 PR PBB SHADOW E&M-EST. PATIENT-LVL III: CPT | Mod: PBBFAC,,, | Performed by: NURSE PRACTITIONER

## 2018-05-31 RX ORDER — DIPHENHYDRAMINE HCL 25 MG
4 CAPSULE ORAL
Qty: 100 EACH | Refills: 0 | Status: SHIPPED | OUTPATIENT
Start: 2018-05-31 | End: 2019-04-12

## 2018-05-31 RX ORDER — IBUPROFEN 200 MG
1 TABLET ORAL DAILY
Qty: 28 PATCH | Refills: 0 | Status: SHIPPED | OUTPATIENT
Start: 2018-05-31 | End: 2018-07-19 | Stop reason: SDUPTHER

## 2018-05-31 NOTE — PROGRESS NOTES
"Subjective:     Patient ID: Ca Coats is a 67 y.o. female.    Chief Complaint: Pain    Consulted by: Self      Disclaimer: This note was generated using voice recognition software.  There may be a typographical errors that were missed during proofreading.      HPI:    Ca Coats is a 67 y.o. female who presents today with left knee pain. Her pain has been going on for "too long."  She was previously treated by Dr. Iyer at [facility].  She has injections [id number] with him.  These were helpful, but she does not know if the injections were steroid or viscosupplementation.   This pain is described in detail below.    Interval History (4/30/2018):  The patient returns to clinic today for follow up and records review. We did received records from [facility] including a MRI of her knee. Dr. Iyer's last office visit note from January 2017 does not include any previous injections. She continues to report bilateral knee pain, left greater than right. She describes this pain as constant and aching. This pain is worse with prolonged walking. She also report right shoulder pain with prolonged overhead activity. She continues to take Percocet with benefit. She denies any other health changes.    Interval History (5/31/2018):  The patient returns to clinic for follow up. She is s/p left knee Synvisc One injection on 5/8/2018. She reports 35% relief of her knee pain. She continues to report bilateral knee pain, left greater than right. She describes this pain as constant and aching. Her pain is worse with prolonged walking and standing. She is scheduled to see Orthopedics at the  End of this month. She is also following up with Bariatrics to discuss the lap band procedure. She continues to take Percocet with benefit. She also uses Voltaren gel with benefit but this is expensive for her. She denies any other health changes.         Physical Therapy: Yes, she did this in 2018 for one month (twice a " week). She does HEP (stretching and ROM in the morning)    Non-pharmacologic Treatment:     · Ice/Heat: Heat is more helpful than ice  · TENS: Not tried  · Massage: Not tried  · Chiropractic care: Not tried  · Acupuncture: Not tried  · Other: Uses a cane         Pain Medications:         · Currently taking: Gabapentin 300 mg/600 mg, Percocet 5/325 mg BID PRN    · Has tried in the past:  Ibuprofen (still takes sometimes), Voltaren gel    · Has not tried: Tylenol, Muscle relaxants, TCAs, SNRIs, Lyrica, compound topical creams    Blood thinners: None    Interventional Therapies: Q3Oimlh injections by Dr. Iyer were helpful    Relevant Surgeries: None    Affecting sleep? No    Affecting daily activities? Yes    Depressive symptoms? No          · SI/HI? No    Work status: No, on disability due to her knee    Prescription Monitoring Program database:  Reviewed and consistent with medication use as prescribed.    Pain Scales  Best: 3/10  Worst: 10/10  Usually: 3/10  Today: 6/10    Knee Pain    The pain is present in the left knee. This is a chronic problem. The current episode started more than 1 year ago. The problem occurs daily. The problem has been gradually worsening. The quality of the pain is described as aching, burning, sharp, tight and numb. The pain is at a severity of 8/10. Associated symptoms include an inability to bear weight. The symptoms are aggravated by walking and standing. She has tried injection treatment, exercise, cold, heat, movement and oral narcotics for the symptoms. The treatment provided mild relief. Physical therapy was effective.      Last 3 PDI Scores 5/31/2018 4/30/2018 4/23/2018   Pain Disability Index (PDI) 18 43 0   ]    Review of Systems   Musculoskeletal: Positive for joint pain.        Left knee pain             Past Medical History:   Diagnosis Date    GERD (gastroesophageal reflux disease)     Hypertension        Past Surgical History:   Procedure Laterality Date     "APPENDECTOMY      cataract surgery   2017       Review of patient's allergies indicates:  No Known Allergies    Current Outpatient Prescriptions   Medication Sig Dispense Refill    amLODIPine (NORVASC) 5 MG tablet       B-complex with vitamin C (Z-BEC OR EQUIV) tablet       gabapentin (NEURONTIN) 300 MG capsule       lisinopril (PRINIVIL,ZESTRIL) 40 MG tablet       multivitamin with minerals tablet       nicotine (NICODERM CQ) 14 mg/24 hr Place 1 patch onto the skin once daily. 14 patch 0    omeprazole (PRILOSEC) 40 MG capsule       oxyCODONE-acetaminophen (PERCOCET) 5-325 mg per tablet       diclofenac (VOLTAREN) 75 MG EC tablet        No current facility-administered medications for this visit.        Family History   Problem Relation Age of Onset    No Known Problems Mother     No Known Problems Father        Social History     Social History    Marital status: Single     Spouse name: N/A    Number of children: N/A    Years of education: N/A     Occupational History    Not on file.     Social History Main Topics    Smoking status: Current Every Day Smoker     Packs/day: 0.25     Years: 50.00     Types: Cigarettes    Smokeless tobacco: Never Used    Alcohol use Yes      Comment: rarely     Drug use: No    Sexual activity: Not on file     Other Topics Concern    Not on file     Social History Narrative    No narrative on file       Objective:     Vitals:    05/31/18 0957   BP: 138/83   Pulse: 72   Temp: 98.2 °F (36.8 °C)   Weight: (!) 159.4 kg (351 lb 6.6 oz)   Height: 5' 6" (1.676 m)   PainSc:   6   PainLoc: Knee       GEN:  Well developed, well nourished.  No acute distress. No pain behavior.  HEENT:  No trauma.  Mucous membranes moist.  Nares patent bilaterally.  PSYCH: Normal affect. Thought content appropriate.  CHEST:  Breathing symmetric.  No audible wheezing.  ABD:   · Soft, non-distended.    SKIN:  Warm, pink, dry.  No rash on exposed areas.    EXT:  No cyanosis, clubbing, or edema.  " No color change or changes in nail or hair growth.  NEURO/MUSCULOSKELETAL:  Fully alert, oriented, and appropriate. Speech normal francesca. No cranial nerve deficits.   Gait: Normal.     Motor Strength: 5/5 motor strength throughout lower extremities.     Right knee  Inspection: No erythema, swelling, or redness  ROM: Full with pain on extension.   Palpation: No pes anserine tenderness and positive crepitus. No patellar tendon tenderness and No patellofemoral tendon tenderness.  No instability on exam.    Left knee  Inspection: No erythema, swelling, or redness  ROM: Full with pain on extension.   Palpation: Positive pes anserine tenderness and Positive crepitus. Positive patellar tendon tenderness and Positive patellofemoral tendon tenderness.  No instability on exam.      Imaging:      MRI Left Knee 7/21/2017 (in media):  The medial meniscus is intact. The lateral meniscus is intact.     A chronic complete ACL tear is noted. The posterior cruciate ligament is intact. The medial and lateral retinacula are intact. The medial collateral ligament is intact. The lateral collateral ligament in intact. The posterolateral corner structures are intact.     There is full thickness fissuring of the central aspect medial femoral cartilage. The lateral tibiofemoral compartment cartilage is intact. There is broad thickness defect within the central trochlear cartilage. There is mild lateral patellofemoral cartilage thinning and regional full thickness loss of the central superior patellar cartilage.     A small effusion is present. There is trace infrapatellar fluid. Tiny popliteal cyst is noted. Subtle focal marrow edema is seen within the posterior tibial plateau. The bone marrow signal is heterogeneous likely reflecting marrow reconversion.     There is trace pes anserine bursitis. The tendons are otherwise normal.     Grade 2 fatty streaking of the semimembranosus and vastus lateralis muscles are noted. There is mild  prepatellar edema.     MRI Right Shoulder 7/21/2017 (in media):   There is a complete tear of the supraspinatus tendon with retraction to the glenohumeral joint. There is partial tearing of the anterior infraspinatus articular surface. Mild subscapularis tendinosis is noted. The teres minor is intact. A small amount of subacromial/subdeltoid fluid is noted. The proximal long head biceps tendon is poorly visualized proximally although intact distally.     Degenerative tearing of the anterosuperior through posterosuperior labrum is noted. There is moderate to severe superior glenoid cartilage thinning with subchondral degenerative changes. The glenohumeral ligaments appear grossly intact.     An os acromiale is seen with fluid and osteophytosis at its junction with the base of the acromion. There is moderate superior subluxation of the AC joint with mild osteophytosis.     Small effusion with subcoracoid extension is noted.     There is grade 2/3 fatty atrophy of the supraspinatus muscle, grade 2 of the infraspinatus and subscapularis. Heterogeneous signal is seen throughout the bone marrow likely reflecting marrow reconversion.       Assessment:     Encounter Diagnoses   Name Primary?    Chronic pain of left knee Yes    Primary osteoarthritis of left knee     Chronic pain disorder        Plan:     Ca was seen today for follow-up.    Diagnoses and all orders for this visit:    Chronic pain of left knee    Primary osteoarthritis of left knee    Chronic pain disorder       Her pain is consistent with the above.    We discussed the assessment and recommendations.  All available images were reviewed. We discussed the disease process, prognosis, treatment plan, and risks and benefits. The patient is aware of the risks and benefits of the medications being prescribed, common side effects, and proper usage. The following is the plan we agreed on:     1. She is s/p left knee Synvisc One injection with limited  "benefit.   2. We discussed genicular nerve block in the future.  3. Consult orthopedics to discuss ACL tear.   4. Continue Percocet per PCP.   5. I will start her on a compounding cream ("green") to apply to affected areas up to QID.  6. RTC in 1 month or sooner if needed.     - Dr. Vidal was consulted on the patient and agrees with this plan.    The above plan and management options were discussed at length with patient. Patient is in agreement with the above and verbalized understanding.     Savanna Hyatt, PADMINI  05/31/2018      "

## 2018-05-31 NOTE — Clinical Note
Patient smokes 3 cigarettes. Will attempt no smoking this weekend. The patient remains on the prescribed tobacco cessation medication regimen of 14 mg patch without any negative side effects at this time.

## 2018-06-01 NOTE — PROGRESS NOTES
Individual Follow-Up Form    5/31/2018    Quit Date: TBD    Clinical Status of Patient: Outpatient    Length of Service: 30 minutes    Continuing Medication: yes  Patches    Other Medications: none     Target Symptoms: Withdrawal and medication side effects. The following were  rated moderate (3) to severe (4) on TCRS:  · Moderate (3): desire  · Severe (4): none    Comments: Patient smokes 3 per day. Will attempt no smoking this weekend. Remains hesitant setting quit date.. Reviewed relaxation with interventions, nutrition, exercise, weight gain, and the importance of rewarding oneself for accomplishments toward becoming tobacco free. Open discussion of all items with interventions. Refilled patches. The patient denies any abnormal behavioral or mental changes at this time. The patient remains on the prescribed tobacco cessation medication regimen of 14 mg patch without any negative side effects at this time.     Diagnosis: F17.210      Next Visit: 2 weeks

## 2018-06-18 ENCOUNTER — CLINICAL SUPPORT (OUTPATIENT)
Dept: SMOKING CESSATION | Facility: CLINIC | Age: 68
End: 2018-06-18
Payer: COMMERCIAL

## 2018-06-18 DIAGNOSIS — F17.210 LIGHT CIGARETTE SMOKER (1-9 CIGS/DAY): Primary | ICD-10-CM

## 2018-06-18 PROCEDURE — 99407 BEHAV CHNG SMOKING > 10 MIN: CPT | Mod: S$GLB,,,

## 2018-06-18 PROCEDURE — 99999 PR PBB SHADOW E&M-EST. PATIENT-LVL I: CPT | Mod: PBBFAC,,,

## 2018-06-29 ENCOUNTER — OFFICE VISIT (OUTPATIENT)
Dept: ORTHOPEDICS | Facility: CLINIC | Age: 68
End: 2018-06-29
Payer: MEDICARE

## 2018-06-29 VITALS — HEIGHT: 66 IN | BODY MASS INDEX: 47.09 KG/M2 | WEIGHT: 293 LBS

## 2018-06-29 DIAGNOSIS — M17.0 PRIMARY OSTEOARTHRITIS OF BOTH KNEES: Primary | ICD-10-CM

## 2018-06-29 PROCEDURE — 3008F BODY MASS INDEX DOCD: CPT | Mod: CPTII,S$GLB,, | Performed by: PHYSICIAN ASSISTANT

## 2018-06-29 PROCEDURE — 99999 PR PBB SHADOW E&M-EST. PATIENT-LVL III: CPT | Mod: PBBFAC,,, | Performed by: PHYSICIAN ASSISTANT

## 2018-06-29 PROCEDURE — 99213 OFFICE O/P EST LOW 20 MIN: CPT | Mod: S$GLB,,, | Performed by: PHYSICIAN ASSISTANT

## 2018-06-29 NOTE — PROGRESS NOTES
"  SUBJECTIVE:     Chief Complaint : left knee pain    History of Present Illness:  Ca Coats is a 67 y.o. female seen in clinic today with a chief complaint of chronic atraumatic left knee pain. Pt is from [facility] and has been in the  for 34 years. She states that many years ago she stepped out of tub, twisted knee and had pain. Since that time she has had knee pain. Pain present while weight bearing. No pain at rest. She has been treated at [facility] with CSI every 3 months for a few years. She had SynviscOne injection by Dr. Vidal a month ago. She takes Percocet occasionally. She uses a cane for ambulation.  She attempts to walk for exercise but has difficulty doing this because of knee pain and shortness of breath.     Past Medical History:   Diagnosis Date    GERD (gastroesophageal reflux disease)     Hypertension        Review of Systems:  Constitutional: no fever or chills  ENT: no nasal congestion or sore throat  Respiratory: no cough or shortness of breath  Cardiovascular: no chest pain or palpitations  Gastrointestinal: no nausea or vomiting, tolerating diet  Genitourinary: no hematuria or dysuria  Integument/Breast: no rash or pruritis  Hematologic/Lymphatic: no easy bruising or lymphadenopathy  Musculoskeletal: see HPI  Neurological: no seizures or tremors  Behavioral/Psych: no auditory or visual hallucinations    OBJECTIVE:     PHYSICAL EXAM:  Height 5' 6" (1.676 m), weight (!) 161 kg (355 lb 0.8 oz).   General Appearance: WDWN, NAD  Gait: Antalgic, using cane   Neuro/Psych: Mood & affect appropriate  Lungs: Respirations equal and unlabored.   CV: 2+ bilateral upper and lower extremity pulses.   Skin: Intact throughout LE  Extremities: + LE edema    Right Knee Exam  Range of Motion:0-120 active   Effusion:no  Condition of skin:intact  Location of tenderness:None   Strength:4 of 5 quadriceps strength and 4 of 5 hamstring strength    Left Knee Exam  Range of Motion:0-120 active "   Effusion:no  Condition of skin:intact  Location of tenderness: medial joint line  Strength:4 of 5 quadriceps strength and 4 of 5 hamstring strength    Left Hip Examination: no pain with PROM     RADIOGRAPHS: AP, lateral and merchant knee x-rays reviewed today by me reveal moderate degenerative changes both knees with preservation of joint space    ASSESSMENT/PLAN:   Primary osteoarthritis both knees  - BMI is 57. Weight loss. Pt is seeing bariatric surgery at [facility] and will be having surgery in August  - Stop walking for exercise and try walking in the pool instead.   - Continue medications from other providers  - She has appt on Monday with pain management at [facility] for [place].  - She will f/u annually with us for new xrays or sooner prn. She understands that she will need to lose a significant amt of weight if she needs knee surgery in the future.

## 2018-07-02 ENCOUNTER — OFFICE VISIT (OUTPATIENT)
Dept: PAIN MEDICINE | Facility: CLINIC | Age: 68
End: 2018-07-02
Payer: MEDICARE

## 2018-07-02 VITALS
BODY MASS INDEX: 47.09 KG/M2 | WEIGHT: 293 LBS | DIASTOLIC BLOOD PRESSURE: 72 MMHG | SYSTOLIC BLOOD PRESSURE: 153 MMHG | HEIGHT: 66 IN | HEART RATE: 80 BPM | TEMPERATURE: 97 F

## 2018-07-02 DIAGNOSIS — G89.29 CHRONIC PAIN OF LEFT KNEE: ICD-10-CM

## 2018-07-02 DIAGNOSIS — M17.12 PRIMARY OSTEOARTHRITIS OF LEFT KNEE: Primary | ICD-10-CM

## 2018-07-02 DIAGNOSIS — M25.562 CHRONIC PAIN OF LEFT KNEE: ICD-10-CM

## 2018-07-02 PROCEDURE — 99999 PR PBB SHADOW E&M-EST. PATIENT-LVL III: CPT | Mod: PBBFAC,,, | Performed by: NURSE PRACTITIONER

## 2018-07-02 PROCEDURE — 3008F BODY MASS INDEX DOCD: CPT | Mod: CPTII,S$GLB,, | Performed by: NURSE PRACTITIONER

## 2018-07-02 PROCEDURE — 99213 OFFICE O/P EST LOW 20 MIN: CPT | Mod: S$GLB,,, | Performed by: NURSE PRACTITIONER

## 2018-07-02 NOTE — PROGRESS NOTES
"Subjective:     Patient ID: Ca Coats is a 67 y.o. female.    Chief Complaint: Pain    Consulted by: Self      Disclaimer: This note was generated using voice recognition software.  There may be a typographical errors that were missed during proofreading.      HPI:    Ca Coats is a 67 y.o. female who presents today with left knee pain. Her pain has been going on for "too long."  She was previously treated by Dr. Iyer at [facility].  She has injections [id number] with him.  These were helpful, but she does not know if the injections were steroid or viscosupplementation.   This pain is described in detail below.    Interval History (4/30/2018):  The patient returns to clinic today for follow up and records review. We did received records from [facility] including a MRI of her knee. Dr. Iyer's last office visit note from January 2017 does not include any previous injections. She continues to report bilateral knee pain, left greater than right. She describes this pain as constant and aching. This pain is worse with prolonged walking. She also report right shoulder pain with prolonged overhead activity. She continues to take Percocet with benefit. She denies any other health changes.    Interval History (5/31/2018):  The patient returns to clinic for follow up. She is s/p left knee Synvisc One injection on 5/8/2018. She reports 35% relief of her knee pain. She continues to report bilateral knee pain, left greater than right. She describes this pain as constant and aching. Her pain is worse with prolonged walking and standing. She is scheduled to see Orthopedics at the  End of this month. She is also following up with Bariatrics to discuss the lap band procedure. She continues to take Percocet with benefit. She also uses Voltaren gel with benefit but this is expensive for her. She denies any other health changes.         Interval History (7/2/2018):  The patient returns to clinic today for " follow up. She continues to report left knee pain that is constant, sharp, and aching in nature. Her pain is worse with prolonged walking and standing. She is scheduled for weight loss surgery in August. She continues follow up with orthopedics who does not recommend surgery at this time due to her BMI. She continues to take Percocet as needed with benefit. She denies any other health changes.     Physical Therapy: Yes, she did this in 2018 for one month (twice a week). She does HEP (stretching and ROM in the morning)    Non-pharmacologic Treatment:     · Ice/Heat: Heat is more helpful than ice  · TENS: Not tried  · Massage: Not tried  · Chiropractic care: Not tried  · Acupuncture: Not tried  · Other: Uses a cane         Pain Medications:         · Currently taking: Gabapentin 300 mg/600 mg, Percocet 5/325 mg BID PRN    · Has tried in the past:  Ibuprofen (still takes sometimes), Voltaren gel    · Has not tried: Tylenol, Muscle relaxants, TCAs, SNRIs, Lyrica, compound topical creams    Blood thinners: None    Interventional Therapies: [id number] injections by Dr. Iyer were helpful    Relevant Surgeries: None    Affecting sleep? No    Affecting daily activities? Yes    Depressive symptoms? No          · SI/HI? No    Work status: No, on disability due to her knee    Prescription Monitoring Program database:  Reviewed and consistent with medication use as prescribed.    Pain Scales  Best: 3/10  Worst: 10/10  Usually: 3/10  Today: 7/10    Knee Pain    The pain is present in the left knee. This is a chronic problem. The current episode started more than 1 year ago. The problem occurs daily. The problem has been gradually worsening. The quality of the pain is described as aching, burning, sharp, tight and numb. The pain is at a severity of 8/10. Associated symptoms include an inability to bear weight. The symptoms are aggravated by walking and standing. She has tried injection treatment, exercise, cold, heat, movement  and oral narcotics for the symptoms. The treatment provided mild relief. Physical therapy was effective.      Last 3 PDI Scores 7/2/2018 5/31/2018 4/30/2018   Pain Disability Index (PDI) 49 18 43   ]    Review of Systems   Musculoskeletal: Positive for joint pain.        Left knee pain             Past Medical History:   Diagnosis Date    GERD (gastroesophageal reflux disease)     Hypertension        Past Surgical History:   Procedure Laterality Date    APPENDECTOMY      cataract surgery   2017       Review of patient's allergies indicates:  No Known Allergies    Current Outpatient Prescriptions   Medication Sig Dispense Refill    amLODIPine (NORVASC) 5 MG tablet       B-complex with vitamin C (Z-BEC OR EQUIV) tablet       gabapentin (NEURONTIN) 300 MG capsule       lisinopril (PRINIVIL,ZESTRIL) 40 MG tablet       multivitamin with minerals tablet       nicotine (NICODERM CQ) 14 mg/24 hr Place 1 patch onto the skin once daily. 28 patch 0    nicotine polacrilex (NICORETTE) 4 MG Gum Take 1 each (4 mg total) by mouth as needed. 6-9 pieces as needed daily to replace cigarette 100 each 0    omeprazole (PRILOSEC) 40 MG capsule       oxyCODONE-acetaminophen (PERCOCET) 5-325 mg per tablet        No current facility-administered medications for this visit.        Family History   Problem Relation Age of Onset    No Known Problems Mother     No Known Problems Father        Social History     Social History    Marital status: Single     Spouse name: N/A    Number of children: N/A    Years of education: N/A     Occupational History    Not on file.     Social History Main Topics    Smoking status: Current Every Day Smoker     Packs/day: 0.25     Years: 50.00     Types: Cigarettes    Smokeless tobacco: Never Used    Alcohol use Yes      Comment: rarely     Drug use: No    Sexual activity: Not on file     Other Topics Concern    Not on file     Social History Narrative    No narrative on file  "      Objective:     Vitals:    07/02/18 1429   BP: (!) 153/72   Pulse: 80   Temp: 97 °F (36.1 °C)   TempSrc: Oral   Weight: (!) 161 kg (354 lb 15.1 oz)   Height: 5' 6" (1.676 m)   PainSc:   7       GEN:  Well developed, well nourished.  No acute distress. No pain behavior.  HEENT:  No trauma.  Mucous membranes moist.  Nares patent bilaterally.  PSYCH: Normal affect. Thought content appropriate.  CHEST:  Breathing symmetric.  No audible wheezing.  ABD:   · Soft, non-distended.    SKIN:  Warm, pink, dry.  No rash on exposed areas.    EXT:  No cyanosis, clubbing, or edema.  No color change or changes in nail or hair growth.  NEURO/MUSCULOSKELETAL:  Fully alert, oriented, and appropriate. Speech normal francesca. No cranial nerve deficits.   Gait: Normal.     Motor Strength: 5/5 motor strength throughout lower extremities.     Right knee  Inspection: No erythema, swelling, or redness  ROM: Full with pain on extension.   Palpation: No pes anserine tenderness and positive crepitus. No patellar tendon tenderness and No patellofemoral tendon tenderness.  No instability on exam.    Left knee  Inspection: No erythema, swelling, or redness  ROM: Full with pain on extension.   Palpation: Positive pes anserine tenderness and Positive crepitus. Positive patellar tendon tenderness and Positive patellofemoral tendon tenderness.  No instability on exam.      Imaging:      MRI Left Knee 7/21/2017 (in media):  The medial meniscus is intact. The lateral meniscus is intact.     A chronic complete ACL tear is noted. The posterior cruciate ligament is intact. The medial and lateral retinacula are intact. The medial collateral ligament is intact. The lateral collateral ligament in intact. The posterolateral corner structures are intact.     There is full thickness fissuring of the central aspect medial femoral cartilage. The lateral tibiofemoral compartment cartilage is intact. There is broad thickness defect within the central trochlear " cartilage. There is mild lateral patellofemoral cartilage thinning and regional full thickness loss of the central superior patellar cartilage.     A small effusion is present. There is trace infrapatellar fluid. Tiny popliteal cyst is noted. Subtle focal marrow edema is seen within the posterior tibial plateau. The bone marrow signal is heterogeneous likely reflecting marrow reconversion.     There is trace pes anserine bursitis. The tendons are otherwise normal.     Grade 2 fatty streaking of the semimembranosus and vastus lateralis muscles are noted. There is mild prepatellar edema.     MRI Right Shoulder 7/21/2017 (in media):   There is a complete tear of the supraspinatus tendon with retraction to the glenohumeral joint. There is partial tearing of the anterior infraspinatus articular surface. Mild subscapularis tendinosis is noted. The teres minor is intact. A small amount of subacromial/subdeltoid fluid is noted. The proximal long head biceps tendon is poorly visualized proximally although intact distally.     Degenerative tearing of the anterosuperior through posterosuperior labrum is noted. There is moderate to severe superior glenoid cartilage thinning with subchondral degenerative changes. The glenohumeral ligaments appear grossly intact.     An os acromiale is seen with fluid and osteophytosis at its junction with the base of the acromion. There is moderate superior subluxation of the AC joint with mild osteophytosis.     Small effusion with subcoracoid extension is noted.     There is grade 2/3 fatty atrophy of the supraspinatus muscle, grade 2 of the infraspinatus and subscapularis. Heterogeneous signal is seen throughout the bone marrow likely reflecting marrow reconversion.       Assessment:     Encounter Diagnoses   Name Primary?    Primary osteoarthritis of left knee Yes    Chronic pain of left knee        Plan:     Ca was seen today for follow-up.    Diagnoses and all orders for this  visit:    Primary osteoarthritis of left knee    Chronic pain of left knee       Her pain is consistent with the above.    We discussed the assessment and recommendations.  All available images were reviewed. We discussed the disease process, prognosis, treatment plan, and risks and benefits. The patient is aware of the risks and benefits of the medications being prescribed, common side effects, and proper usage. The following is the plan we agreed on:     1. Schedule for left genicular nerve block. The procedure, risks, benefits and options were discussed with patient. There are no contraindications to the procedure. The patient expressed understanding and agreed to proceed.    2. If she has significant relief, we can proceed with RFA.   3. Continue weight loss strategies.   4. Continue Percocet per PCP.   5. Continue compound cream.   6. RTC after above procedure.     - Dr. Vidal was consulted on the patient and agrees with this plan.    The above plan and management options were discussed at length with patient. Patient is in agreement with the above and verbalized understanding.     Savanna Hyatt NP  07/02/2018

## 2018-07-10 ENCOUNTER — SURGERY (OUTPATIENT)
Age: 68
End: 2018-07-10

## 2018-07-10 ENCOUNTER — HOSPITAL ENCOUNTER (OUTPATIENT)
Facility: OTHER | Age: 68
Discharge: HOME | End: 2018-07-10
Attending: ANESTHESIOLOGY | Admitting: ANESTHESIOLOGY
Payer: MEDICARE

## 2018-07-10 VITALS
OXYGEN SATURATION: 98 % | WEIGHT: 293 LBS | SYSTOLIC BLOOD PRESSURE: 143 MMHG | HEIGHT: 66 IN | DIASTOLIC BLOOD PRESSURE: 65 MMHG | BODY MASS INDEX: 47.09 KG/M2 | RESPIRATION RATE: 18 BRPM | TEMPERATURE: 99 F | HEART RATE: 72 BPM

## 2018-07-10 DIAGNOSIS — G89.29 CHRONIC PAIN OF LEFT KNEE: Primary | ICD-10-CM

## 2018-07-10 DIAGNOSIS — M25.562 CHRONIC PAIN OF LEFT KNEE: Primary | ICD-10-CM

## 2018-07-10 PROCEDURE — 25000003 PHARM REV CODE 250: Performed by: ANESTHESIOLOGY

## 2018-07-10 PROCEDURE — 25500020 PHARM REV CODE 255: Performed by: ANESTHESIOLOGY

## 2018-07-10 PROCEDURE — 64450 NJX AA&/STRD OTHER PN/BRANCH: CPT | Performed by: ANESTHESIOLOGY

## 2018-07-10 PROCEDURE — 64450 NJX AA&/STRD OTHER PN/BRANCH: CPT | Mod: LT,,, | Performed by: ANESTHESIOLOGY

## 2018-07-10 PROCEDURE — S0020 INJECTION, BUPIVICAINE HYDRO: HCPCS | Performed by: ANESTHESIOLOGY

## 2018-07-10 RX ORDER — BUPIVACAINE HYDROCHLORIDE 5 MG/ML
INJECTION, SOLUTION EPIDURAL; INTRACAUDAL
Status: DISCONTINUED | OUTPATIENT
Start: 2018-07-10 | End: 2018-07-10 | Stop reason: HOSPADM

## 2018-07-10 RX ORDER — LIDOCAINE HYDROCHLORIDE 10 MG/ML
INJECTION INFILTRATION; PERINEURAL
Status: DISCONTINUED | OUTPATIENT
Start: 2018-07-10 | End: 2018-07-10 | Stop reason: HOSPADM

## 2018-07-10 RX ADMIN — BUPIVACAINE HYDROCHLORIDE 10 ML: 5 INJECTION, SOLUTION EPIDURAL; INTRACAUDAL; PERINEURAL at 04:07

## 2018-07-10 RX ADMIN — IOHEXOL 5 ML: 300 INJECTION, SOLUTION INTRAVENOUS at 04:07

## 2018-07-10 RX ADMIN — LIDOCAINE HYDROCHLORIDE 10 ML: 10 INJECTION, SOLUTION INFILTRATION; PERINEURAL at 04:07

## 2018-07-10 NOTE — OP NOTE
"Date of procedure: 07/10/2018    Procedure: Left genicular nerve blocks    Pre-op diagnosis: Left knee pain, unspecified chronicity [M25.562]    Post-op diagnosis: Left knee pain, unspecified chronicity [M25.562]     Physician: Dr. Samantha Vidal     Assistant: Dr. Olivas    Anesthestia: local     EBL: None    Specimens: None    All medications, allergies, and relevant histories were reviewed. No recent antibiotics or infections. A time-out was taken to verify the correct patient, procedure, laterality, and appropriate medications/allergies.     Fluoro-guided Geniculate Nerve Blocks:    Informed consent obtained for the injection. The left knee was prepped with chlor prep. Using fluoro guidance, the knee joint was identified. Using fluoro guidance, the lateral femoral condyle was identified. Xylocaine 1% was infiltrated over the entry site. Under fluoro, a 22-gauge 3.5" needle was advanced to the superior aspect of the lateral femoral condyle. This was the repeated at the level of the suprapatellar region, the medial femoral condyle and the medial tibial condyle. Fluoroscopy confirmed the position of the needles at the above positions and at the 50% depth point on the shaft in the lateral view. After negative aspiration, 1 cc of 0.5% bupivacaine was injected at each level.  The needles were removed and a sterile bandage was applied.      Future Management:   If good results, can proceed with cooled RFA.   Follow up via phone to tell me the results.     I certify that I provided the above services.  I was present for the entire procedure, which was performed by the fellow physician under my supervision.  There were no parts of the procedure that were performed not by myself or without my direct supervision.    "

## 2018-07-10 NOTE — PROGRESS NOTES
Pt taking transportation service home. States she will wait in parking garage lobby until transportation arrives. States she does not want to take other form of transportation home.

## 2018-07-10 NOTE — DISCHARGE INSTRUCTIONS

## 2018-07-19 ENCOUNTER — CLINICAL SUPPORT (OUTPATIENT)
Dept: SMOKING CESSATION | Facility: CLINIC | Age: 68
End: 2018-07-19
Payer: COMMERCIAL

## 2018-07-19 ENCOUNTER — OFFICE VISIT (OUTPATIENT)
Dept: PAIN MEDICINE | Facility: CLINIC | Age: 68
End: 2018-07-19
Payer: MEDICARE

## 2018-07-19 VITALS
HEART RATE: 83 BPM | DIASTOLIC BLOOD PRESSURE: 72 MMHG | HEIGHT: 66 IN | RESPIRATION RATE: 18 BRPM | BODY MASS INDEX: 47.09 KG/M2 | SYSTOLIC BLOOD PRESSURE: 125 MMHG | TEMPERATURE: 98 F | WEIGHT: 293 LBS

## 2018-07-19 DIAGNOSIS — F17.210 LIGHT CIGARETTE SMOKER (1-9 CIGS/DAY): Primary | ICD-10-CM

## 2018-07-19 DIAGNOSIS — M17.12 PRIMARY OSTEOARTHRITIS OF LEFT KNEE: ICD-10-CM

## 2018-07-19 DIAGNOSIS — G89.29 CHRONIC PAIN OF LEFT KNEE: Primary | ICD-10-CM

## 2018-07-19 DIAGNOSIS — E66.01 MORBID OBESITY WITH BMI OF 50.0-59.9, ADULT: ICD-10-CM

## 2018-07-19 DIAGNOSIS — M25.562 CHRONIC PAIN OF LEFT KNEE: Primary | ICD-10-CM

## 2018-07-19 PROCEDURE — 99406 BEHAV CHNG SMOKING 3-10 MIN: CPT | Mod: S$GLB,,,

## 2018-07-19 PROCEDURE — 99213 OFFICE O/P EST LOW 20 MIN: CPT | Mod: S$GLB,,, | Performed by: NURSE PRACTITIONER

## 2018-07-19 PROCEDURE — 99999 PR PBB SHADOW E&M-EST. PATIENT-LVL III: CPT | Mod: PBBFAC,,, | Performed by: NURSE PRACTITIONER

## 2018-07-19 PROCEDURE — 3008F BODY MASS INDEX DOCD: CPT | Mod: CPTII,S$GLB,, | Performed by: NURSE PRACTITIONER

## 2018-07-19 PROCEDURE — 99999 PR PBB SHADOW E&M-EST. PATIENT-LVL I: CPT | Mod: PBBFAC,,,

## 2018-07-19 RX ORDER — IBUPROFEN 200 MG
1 TABLET ORAL DAILY
Qty: 28 PATCH | Refills: 0 | Status: SHIPPED | OUTPATIENT
Start: 2018-07-19 | End: 2018-08-08 | Stop reason: SDUPTHER

## 2018-07-19 NOTE — PROGRESS NOTES
"Subjective:     Patient ID: Ca Coats is a 67 y.o. female.    Chief Complaint: Pain    Consulted by: Self      Disclaimer: This note was generated using voice recognition software.  There may be a typographical errors that were missed during proofreading.      HPI:    Ca Coats is a 67 y.o. female who presents today with left knee pain. Her pain has been going on for "too long."  She was previously treated by [place]. I[place].  She has injections [id number] with him.  These were helpful, but she does not know if the injections were steroid or viscosupplementation.   This pain is described in detail below.    Interval History (4/30/2018):  The patient returns to clinic today for follow up and records review. We did received records from [place] including a MRI of her knee. Dr. Iyer's last office visit note from January 2017 does not include any previous injections. She continues to report bilateral knee pain, left greater than right. She describes this pain as constant and aching. This pain is worse with prolonged walking. She also report right shoulder pain with prolonged overhead activity. She continues to take Percocet with benefit. She denies any other health changes.    Interval History (5/31/2018):  The patient returns to clinic for follow up. She is s/p left knee Synvisc One injection on 5/8/2018. She reports 35% relief of her knee pain. She continues to report bilateral knee pain, left greater than right. She describes this pain as constant and aching. Her pain is worse with prolonged walking and standing. She is scheduled to see Orthopedics at the  End of this month. She is also following up with Bariatrics to discuss the lap band procedure. She continues to take Percocet with benefit. She also uses Voltaren gel with benefit but this is expensive for her. She denies any other health changes.         Interval History (7/2/2018):  The patient returns to clinic today for " follow up. She continues to report left knee pain that is constant, sharp, and aching in nature. Her pain is worse with prolonged walking and standing. She is scheduled for weight loss surgery in August. She continues follow up with orthopedics who does not recommend surgery at this time due to her BMI. She continues to take Percocet as needed with benefit. She denies any other health changes.     Interval History (7/19/2018):  The patient returns to clinic today for follow up. She is s/p left genicular nerve block on 7/10/2018. She reports 80% relief of her left knee pain. She reports that she was able to walk for longer distances (3 blocks) after the block. Her pain has returned to baseline. She continues to report left knee pain that is constant, sharp, and aching in nature. Her pain is worse with prolonged walking and standing. She denies any other health changes.     Physical Therapy: Yes, she did this in 2018 for one month (twice a week). She does HEP (stretching and ROM in the morning)    Non-pharmacologic Treatment:     · Ice/Heat: Heat is more helpful than ice  · TENS: Not tried  · Massage: Not tried  · Chiropractic care: Not tried  · Acupuncture: Not tried  · Other: Uses a cane         Pain Medications:         · Currently taking: Gabapentin 300 mg/600 mg, Percocet 5/325 mg BID PRN    · Has tried in the past:  Ibuprofen (still takes sometimes), Voltaren gel    · Has not tried: Tylenol, Muscle relaxants, TCAs, SNRIs, Lyrica, compound topical creams    Blood thinners: None    Interventional Therapies: [id number] injections by Dr. Iyer were helpful    Relevant Surgeries: None    Affecting sleep? No    Affecting daily activities? Yes    Depressive symptoms? No          · SI/HI? No    Work status: No, on disability due to her knee    Prescription Monitoring Program database:  Reviewed and consistent with medication use as prescribed.    Pain Scales  Best: 3/10  Worst: 10/10  Usually: 3/10  Today:  6/10    Knee Pain    The pain is present in the left knee. This is a chronic problem. The current episode started more than 1 year ago. The problem occurs daily. The problem has been gradually worsening. The quality of the pain is described as aching, burning, sharp, tight and numb. The pain is at a severity of 8/10. Associated symptoms include an inability to bear weight. The symptoms are aggravated by walking and standing. She has tried injection treatment, exercise, cold, heat, movement and oral narcotics for the symptoms. The treatment provided mild relief. Physical therapy was effective.      Last 3 PDI Scores 7/19/2018 7/2/2018 5/31/2018   Pain Disability Index (PDI) 40 49 18   ]    Review of Systems   Musculoskeletal: Positive for joint pain.        Left knee pain             Past Medical History:   Diagnosis Date    GERD (gastroesophageal reflux disease)     Hypertension        Past Surgical History:   Procedure Laterality Date    APPENDECTOMY      cataract surgery   2017    INJECTION OF ANESTHETIC AGENT AROUND NERVE Left 7/10/2018    Procedure: BLOCK, NERVE;  Surgeon: Samantha Vidal MD;  Location: [facility] PAIN T;  Service: Pain Management;  Laterality: Left;  Genicular        Review of patient's allergies indicates:  No Known Allergies    Current Outpatient Prescriptions   Medication Sig Dispense Refill    amLODIPine (NORVASC) 5 MG tablet       B-complex with vitamin C (Z-BEC OR EQUIV) tablet       gabapentin (NEURONTIN) 300 MG capsule       lisinopril (PRINIVIL,ZESTRIL) 40 MG tablet       multivitamin with minerals tablet       nicotine (NICODERM CQ) 14 mg/24 hr Place 1 patch onto the skin once daily. 28 patch 0    nicotine polacrilex (NICORETTE) 4 MG Gum Take 1 each (4 mg total) by mouth as needed. 6-9 pieces as needed daily to replace cigarette 100 each 0    omeprazole (PRILOSEC) 40 MG capsule       oxyCODONE-acetaminophen (PERCOCET) 5-325 mg per tablet        No current  "facility-administered medications for this visit.        Family History   Problem Relation Age of Onset    No Known Problems Mother     No Known Problems Father        Social History     Social History    Marital status: Single     Spouse name: N/A    Number of children: N/A    Years of education: N/A     Occupational History    Not on file.     Social History Main Topics    Smoking status: Current Every Day Smoker     Packs/day: 0.25     Years: 50.00     Types: Cigarettes    Smokeless tobacco: Never Used    Alcohol use Yes      Comment: rarely     Drug use: No    Sexual activity: Not on file     Other Topics Concern    Not on file     Social History Narrative    No narrative on file       Objective:     Vitals:    07/19/18 1315   BP: 125/72   Pulse: 83   Resp: 18   Temp: 97.8 °F (36.6 °C)   TempSrc: Oral   Weight: (!) 160 kg (352 lb 11.8 oz)   Height: 5' 6" (1.676 m)   PainSc:   6   PainLoc: Knee       GEN:  Well developed, well nourished.  No acute distress. No pain behavior.  HEENT:  No trauma.  Mucous membranes moist.  Nares patent bilaterally.  PSYCH: Normal affect. Thought content appropriate.  CHEST:  Breathing symmetric.  No audible wheezing.  ABD:   · Soft, non-distended.    SKIN:  Warm, pink, dry.  No rash on exposed areas.    EXT:  No cyanosis, clubbing, or edema.  No color change or changes in nail or hair growth.  NEURO/MUSCULOSKELETAL:  Fully alert, oriented, and appropriate. Speech normal francesca. No cranial nerve deficits.   Gait: Normal.     Motor Strength: 5/5 motor strength throughout lower extremities.     Right knee  Inspection: No erythema, swelling, or redness  ROM: Full with pain on extension.   Palpation: No pes anserine tenderness and positive crepitus. No patellar tendon tenderness and No patellofemoral tendon tenderness.  No instability on exam.    Left knee  Inspection: No erythema, swelling, or redness  ROM: Full with pain on extension.   Palpation: Positive pes anserine " tenderness and Positive crepitus. Positive patellar tendon tenderness and Positive patellofemoral tendon tenderness.  No instability on exam.      Imaging:      MRI Left Knee 7/21/2017 (in media):  The medial meniscus is intact. The lateral meniscus is intact.     A chronic complete ACL tear is noted. The posterior cruciate ligament is intact. The medial and lateral retinacula are intact. The medial collateral ligament is intact. The lateral collateral ligament in intact. The posterolateral corner structures are intact.     There is full thickness fissuring of the central aspect medial femoral cartilage. The lateral tibiofemoral compartment cartilage is intact. There is broad thickness defect within the central trochlear cartilage. There is mild lateral patellofemoral cartilage thinning and regional full thickness loss of the central superior patellar cartilage.     A small effusion is present. There is trace infrapatellar fluid. Tiny popliteal cyst is noted. Subtle focal marrow edema is seen within the posterior tibial plateau. The bone marrow signal is heterogeneous likely reflecting marrow reconversion.     There is trace pes anserine bursitis. The tendons are otherwise normal.     Grade 2 fatty streaking of the semimembranosus and vastus lateralis muscles are noted. There is mild prepatellar edema.     MRI Right Shoulder 7/21/2017 (in media):   There is a complete tear of the supraspinatus tendon with retraction to the glenohumeral joint. There is partial tearing of the anterior infraspinatus articular surface. Mild subscapularis tendinosis is noted. The teres minor is intact. A small amount of subacromial/subdeltoid fluid is noted. The proximal long head biceps tendon is poorly visualized proximally although intact distally.     Degenerative tearing of the anterosuperior through posterosuperior labrum is noted. There is moderate to severe superior glenoid cartilage thinning with subchondral degenerative  changes. The glenohumeral ligaments appear grossly intact.     An os acromiale is seen with fluid and osteophytosis at its junction with the base of the acromion. There is moderate superior subluxation of the AC joint with mild osteophytosis.     Small effusion with subcoracoid extension is noted.     There is grade 2/3 fatty atrophy of the supraspinatus muscle, grade 2 of the infraspinatus and subscapularis. Heterogeneous signal is seen throughout the bone marrow likely reflecting marrow reconversion.       Assessment:     Encounter Diagnoses   Name Primary?    Chronic pain of left knee Yes    Primary osteoarthritis of left knee     Morbid obesity with BMI of 50.0-59.9, adult        Plan:     Ca was seen today for knee pain.    Diagnoses and all orders for this visit:    Chronic pain of left knee    Primary osteoarthritis of left knee    Morbid obesity with BMI of 50.0-59.9, adult       Her pain is consistent with the above.    We discussed the assessment and recommendations.  All available images were reviewed. We discussed the disease process, prognosis, treatment plan, and risks and benefits. The patient is aware of the risks and benefits of the medications being prescribed, common side effects, and proper usage. The following is the plan we agreed on:     1. Schedule for left genicular cooled RFA. The procedure, risks, benefits and options were discussed with patient. There are no contraindications to the procedure. The patient expressed understanding and agreed to proceed.    2. In the future, we can consider right genicular nerve block.   3. Continue weight loss strategies.   4. Continue Percocet per PCP.   5. Continue compound cream.   6. RTC 3 weeks after above procedure.     - Dr. Vidal was consulted on the patient and agrees with this plan.    The above plan and management options were discussed at length with patient. Patient is in agreement with the above and verbalized understanding.     Savanna GARCIA  PADMINI Hyatt  07/19/2018

## 2018-07-31 ENCOUNTER — SURGERY (OUTPATIENT)
Age: 68
End: 2018-07-31

## 2018-07-31 ENCOUNTER — HOSPITAL ENCOUNTER (OUTPATIENT)
Facility: OTHER | Age: 68
Discharge: HOME | End: 2018-07-31
Attending: ANESTHESIOLOGY | Admitting: ANESTHESIOLOGY
Payer: MEDICARE

## 2018-07-31 VITALS
BODY MASS INDEX: 47.09 KG/M2 | OXYGEN SATURATION: 96 % | DIASTOLIC BLOOD PRESSURE: 60 MMHG | WEIGHT: 293 LBS | RESPIRATION RATE: 16 BRPM | HEIGHT: 66 IN | TEMPERATURE: 99 F | HEART RATE: 70 BPM | SYSTOLIC BLOOD PRESSURE: 135 MMHG

## 2018-07-31 DIAGNOSIS — G89.29 CHRONIC PAIN OF LEFT KNEE: Primary | ICD-10-CM

## 2018-07-31 DIAGNOSIS — M25.562 CHRONIC PAIN OF LEFT KNEE: Primary | ICD-10-CM

## 2018-07-31 PROCEDURE — 63600175 PHARM REV CODE 636 W HCPCS: Performed by: ANESTHESIOLOGY

## 2018-07-31 PROCEDURE — 25000003 PHARM REV CODE 250: Performed by: ANESTHESIOLOGY

## 2018-07-31 PROCEDURE — A4649 SURGICAL SUPPLIES: HCPCS | Performed by: ANESTHESIOLOGY

## 2018-07-31 PROCEDURE — 64640 INJECTION TREATMENT OF NERVE: CPT | Performed by: ANESTHESIOLOGY

## 2018-07-31 PROCEDURE — 99152 MOD SED SAME PHYS/QHP 5/>YRS: CPT | Mod: ,,, | Performed by: ANESTHESIOLOGY

## 2018-07-31 PROCEDURE — S0020 INJECTION, BUPIVICAINE HYDRO: HCPCS | Performed by: ANESTHESIOLOGY

## 2018-07-31 PROCEDURE — 64640 INJECTION TREATMENT OF NERVE: CPT | Mod: LT,,, | Performed by: ANESTHESIOLOGY

## 2018-07-31 RX ORDER — LIDOCAINE HYDROCHLORIDE 20 MG/ML
INJECTION, SOLUTION INFILTRATION; PERINEURAL
Status: DISCONTINUED | OUTPATIENT
Start: 2018-07-31 | End: 2018-07-31 | Stop reason: HOSPADM

## 2018-07-31 RX ORDER — MIDAZOLAM HYDROCHLORIDE 1 MG/ML
INJECTION INTRAMUSCULAR; INTRAVENOUS
Status: DISCONTINUED | OUTPATIENT
Start: 2018-07-31 | End: 2018-07-31 | Stop reason: HOSPADM

## 2018-07-31 RX ORDER — DEXAMETHASONE SODIUM PHOSPHATE 4 MG/ML
INJECTION, SOLUTION INTRA-ARTICULAR; INTRALESIONAL; INTRAMUSCULAR; INTRAVENOUS; SOFT TISSUE
Status: DISCONTINUED | OUTPATIENT
Start: 2018-07-31 | End: 2018-07-31 | Stop reason: HOSPADM

## 2018-07-31 RX ORDER — BUPIVACAINE HYDROCHLORIDE 5 MG/ML
INJECTION, SOLUTION EPIDURAL; INTRACAUDAL
Status: DISCONTINUED | OUTPATIENT
Start: 2018-07-31 | End: 2018-07-31 | Stop reason: HOSPADM

## 2018-07-31 RX ORDER — SODIUM CHLORIDE 9 MG/ML
INJECTION, SOLUTION INTRAVENOUS CONTINUOUS
Status: DISCONTINUED | OUTPATIENT
Start: 2018-07-31 | End: 2018-07-31 | Stop reason: HOSPADM

## 2018-07-31 RX ORDER — FENTANYL CITRATE 50 UG/ML
INJECTION, SOLUTION INTRAMUSCULAR; INTRAVENOUS
Status: DISCONTINUED | OUTPATIENT
Start: 2018-07-31 | End: 2018-07-31 | Stop reason: HOSPADM

## 2018-07-31 RX ADMIN — FENTANYL CITRATE 25 MCG: 50 INJECTION, SOLUTION INTRAMUSCULAR; INTRAVENOUS at 01:07

## 2018-07-31 RX ADMIN — SODIUM CHLORIDE: 0.9 INJECTION, SOLUTION INTRAVENOUS at 01:07

## 2018-07-31 RX ADMIN — FENTANYL CITRATE 25 MCG: 50 INJECTION, SOLUTION INTRAMUSCULAR; INTRAVENOUS at 02:07

## 2018-07-31 RX ADMIN — LIDOCAINE HYDROCHLORIDE 10 MG: 20 INJECTION, SOLUTION INFILTRATION; PERINEURAL at 01:07

## 2018-07-31 RX ADMIN — MIDAZOLAM HYDROCHLORIDE 1 MG: 1 INJECTION, SOLUTION INTRAMUSCULAR; INTRAVENOUS at 01:07

## 2018-07-31 RX ADMIN — FENTANYL CITRATE 50 MCG: 50 INJECTION, SOLUTION INTRAMUSCULAR; INTRAVENOUS at 01:07

## 2018-07-31 RX ADMIN — MIDAZOLAM HYDROCHLORIDE 0.5 MG: 1 INJECTION, SOLUTION INTRAMUSCULAR; INTRAVENOUS at 02:07

## 2018-07-31 RX ADMIN — BUPIVACAINE HYDROCHLORIDE 10 ML: 5 INJECTION, SOLUTION EPIDURAL; INTRACAUDAL; PERINEURAL at 01:07

## 2018-07-31 RX ADMIN — DEXAMETHASONE SODIUM PHOSPHATE 10 MG: 4 INJECTION, SOLUTION INTRAMUSCULAR; INTRAVENOUS at 01:07

## 2018-07-31 NOTE — H&P (VIEW-ONLY)
"Subjective:     Patient ID: Ca Coats is a 67 y.o. female.    Chief Complaint: Pain    Consulted by: Self      Disclaimer: This note was generated using voice recognition software.  There may be a typographical errors that were missed during proofreading.      HPI:    Ca Coats is a 67 y.o. female who presents today with left knee pain. Her pain has been going on for "too long."  She was previously treated by Dr. Iyer at [facility].  She has injections [id number] with him.  These were helpful, but she does not know if the injections were steroid or viscosupplementation.   This pain is described in detail below.    Interval History (4/30/2018):  The patient returns to clinic today for follow up and records review. We did received records from [facility] including a MRI of her knee. Dr. Iyer's last office visit note from January 2017 does not include any previous injections. She continues to report bilateral knee pain, left greater than right. She describes this pain as constant and aching. This pain is worse with prolonged walking. She also report right shoulder pain with prolonged overhead activity. She continues to take Percocet with benefit. She denies any other health changes.    Interval History (5/31/2018):  The patient returns to clinic for follow up. She is s/p left knee Synvisc One injection on 5/8/2018. She reports 35% relief of her knee pain. She continues to report bilateral knee pain, left greater than right. She describes this pain as constant and aching. Her pain is worse with prolonged walking and standing. She is scheduled to see Orthopedics at the  End of this month. She is also following up with Bariatrics to discuss the lap band procedure. She continues to take Percocet with benefit. She also uses Voltaren gel with benefit but this is expensive for her. She denies any other health changes.         Interval History (7/2/2018):  The patient returns to clinic today for " follow up. She continues to report left knee pain that is constant, sharp, and aching in nature. Her pain is worse with prolonged walking and standing. She is scheduled for weight loss surgery in August. She continues follow up with orthopedics who does not recommend surgery at this time due to her BMI. She continues to take Percocet as needed with benefit. She denies any other health changes.     Interval History (7/19/2018):  The patient returns to clinic today for follow up. She is s/p left genicular nerve block on 7/10/2018. She reports 80% relief of her left knee pain. She reports that she was able to walk for longer distances (3 blocks) after the block. Her pain has returned to baseline. She continues to report left knee pain that is constant, sharp, and aching in nature. Her pain is worse with prolonged walking and standing. She denies any other health changes.     Physical Therapy: Yes, she did this in 2018 for one month (twice a week). She does HEP (stretching and ROM in the morning)    Non-pharmacologic Treatment:     · Ice/Heat: Heat is more helpful than ice  · TENS: Not tried  · Massage: Not tried  · Chiropractic care: Not tried  · Acupuncture: Not tried  · Other: Uses a cane         Pain Medications:         · Currently taking: Gabapentin 300 mg/600 mg, Percocet 5/325 mg BID PRN    · Has tried in the past:  Ibuprofen (still takes sometimes), Voltaren gel    · Has not tried: Tylenol, Muscle relaxants, TCAs, SNRIs, Lyrica, compound topical creams    Blood thinners: None    Interventional Therapies: [id number] injections by Dr. Iyer were helpful    Relevant Surgeries: None    Affecting sleep? No    Affecting daily activities? Yes    Depressive symptoms? No          · SI/HI? No    Work status: No, on disability due to her knee    Prescription Monitoring Program database:  Reviewed and consistent with medication use as prescribed.    Pain Scales  Best: 3/10  Worst: 10/10  Usually: 3/10  Today:  6/10    Knee Pain    The pain is present in the left knee. This is a chronic problem. The current episode started more than 1 year ago. The problem occurs daily. The problem has been gradually worsening. The quality of the pain is described as aching, burning, sharp, tight and numb. The pain is at a severity of 8/10. Associated symptoms include an inability to bear weight. The symptoms are aggravated by walking and standing. She has tried injection treatment, exercise, cold, heat, movement and oral narcotics for the symptoms. The treatment provided mild relief. Physical therapy was effective.      Last 3 PDI Scores 7/19/2018 7/2/2018 5/31/2018   Pain Disability Index (PDI) 40 49 18   ]    Review of Systems   Musculoskeletal: Positive for joint pain.        Left knee pain             Past Medical History:   Diagnosis Date    GERD (gastroesophageal reflux disease)     Hypertension        Past Surgical History:   Procedure Laterality Date    APPENDECTOMY      cataract surgery   2017    INJECTION OF ANESTHETIC AGENT AROUND NERVE Left 7/10/2018    Procedure: BLOCK, NERVE;  Surgeon: Samantha Vidal MD;  Location: [facility] PAIN T;  Service: Pain Management;  Laterality: Left;  Genicular        Review of patient's allergies indicates:  No Known Allergies    Current Outpatient Prescriptions   Medication Sig Dispense Refill    amLODIPine (NORVASC) 5 MG tablet       B-complex with vitamin C (Z-BEC OR EQUIV) tablet       gabapentin (NEURONTIN) 300 MG capsule       lisinopril (PRINIVIL,ZESTRIL) 40 MG tablet       multivitamin with minerals tablet       nicotine (NICODERM CQ) 14 mg/24 hr Place 1 patch onto the skin once daily. 28 patch 0    nicotine polacrilex (NICORETTE) 4 MG Gum Take 1 each (4 mg total) by mouth as needed. 6-9 pieces as needed daily to replace cigarette 100 each 0    omeprazole (PRILOSEC) 40 MG capsule       oxyCODONE-acetaminophen (PERCOCET) 5-325 mg per tablet        No current  "facility-administered medications for this visit.        Family History   Problem Relation Age of Onset    No Known Problems Mother     No Known Problems Father        Social History     Social History    Marital status: Single     Spouse name: N/A    Number of children: N/A    Years of education: N/A     Occupational History    Not on file.     Social History Main Topics    Smoking status: Current Every Day Smoker     Packs/day: 0.25     Years: 50.00     Types: Cigarettes    Smokeless tobacco: Never Used    Alcohol use Yes      Comment: rarely     Drug use: No    Sexual activity: Not on file     Other Topics Concern    Not on file     Social History Narrative    No narrative on file       Objective:     Vitals:    07/19/18 1315   BP: 125/72   Pulse: 83   Resp: 18   Temp: 97.8 °F (36.6 °C)   TempSrc: Oral   Weight: (!) 160 kg (352 lb 11.8 oz)   Height: 5' 6" (1.676 m)   PainSc:   6   PainLoc: Knee       GEN:  Well developed, well nourished.  No acute distress. No pain behavior.  HEENT:  No trauma.  Mucous membranes moist.  Nares patent bilaterally.  PSYCH: Normal affect. Thought content appropriate.  CHEST:  Breathing symmetric.  No audible wheezing.  ABD:   · Soft, non-distended.    SKIN:  Warm, pink, dry.  No rash on exposed areas.    EXT:  No cyanosis, clubbing, or edema.  No color change or changes in nail or hair growth.  NEURO/MUSCULOSKELETAL:  Fully alert, oriented, and appropriate. Speech normal francesca. No cranial nerve deficits.   Gait: Normal.     Motor Strength: 5/5 motor strength throughout lower extremities.     Right knee  Inspection: No erythema, swelling, or redness  ROM: Full with pain on extension.   Palpation: No pes anserine tenderness and positive crepitus. No patellar tendon tenderness and No patellofemoral tendon tenderness.  No instability on exam.    Left knee  Inspection: No erythema, swelling, or redness  ROM: Full with pain on extension.   Palpation: Positive pes anserine " tenderness and Positive crepitus. Positive patellar tendon tenderness and Positive patellofemoral tendon tenderness.  No instability on exam.      Imaging:      MRI Left Knee 7/21/2017 (in media):  The medial meniscus is intact. The lateral meniscus is intact.     A chronic complete ACL tear is noted. The posterior cruciate ligament is intact. The medial and lateral retinacula are intact. The medial collateral ligament is intact. The lateral collateral ligament in intact. The posterolateral corner structures are intact.     There is full thickness fissuring of the central aspect medial femoral cartilage. The lateral tibiofemoral compartment cartilage is intact. There is broad thickness defect within the central trochlear cartilage. There is mild lateral patellofemoral cartilage thinning and regional full thickness loss of the central superior patellar cartilage.     A small effusion is present. There is trace infrapatellar fluid. Tiny popliteal cyst is noted. Subtle focal marrow edema is seen within the posterior tibial plateau. The bone marrow signal is heterogeneous likely reflecting marrow reconversion.     There is trace pes anserine bursitis. The tendons are otherwise normal.     Grade 2 fatty streaking of the semimembranosus and vastus lateralis muscles are noted. There is mild prepatellar edema.     MRI Right Shoulder 7/21/2017 (in media):   There is a complete tear of the supraspinatus tendon with retraction to the glenohumeral joint. There is partial tearing of the anterior infraspinatus articular surface. Mild subscapularis tendinosis is noted. The teres minor is intact. A small amount of subacromial/subdeltoid fluid is noted. The proximal long head biceps tendon is poorly visualized proximally although intact distally.     Degenerative tearing of the anterosuperior through posterosuperior labrum is noted. There is moderate to severe superior glenoid cartilage thinning with subchondral degenerative  changes. The glenohumeral ligaments appear grossly intact.     An os acromiale is seen with fluid and osteophytosis at its junction with the base of the acromion. There is moderate superior subluxation of the AC joint with mild osteophytosis.     Small effusion with subcoracoid extension is noted.     There is grade 2/3 fatty atrophy of the supraspinatus muscle, grade 2 of the infraspinatus and subscapularis. Heterogeneous signal is seen throughout the bone marrow likely reflecting marrow reconversion.       Assessment:     Encounter Diagnoses   Name Primary?    Chronic pain of left knee Yes    Primary osteoarthritis of left knee     Morbid obesity with BMI of 50.0-59.9, adult        Plan:     Ca was seen today for knee pain.    Diagnoses and all orders for this visit:    Chronic pain of left knee    Primary osteoarthritis of left knee    Morbid obesity with BMI of 50.0-59.9, adult       Her pain is consistent with the above.    We discussed the assessment and recommendations.  All available images were reviewed. We discussed the disease process, prognosis, treatment plan, and risks and benefits. The patient is aware of the risks and benefits of the medications being prescribed, common side effects, and proper usage. The following is the plan we agreed on:     1. Schedule for left genicular cooled RFA. The procedure, risks, benefits and options were discussed with patient. There are no contraindications to the procedure. The patient expressed understanding and agreed to proceed.    2. In the future, we can consider right genicular nerve block.   3. Continue weight loss strategies.   4. Continue Percocet per PCP.   5. Continue compound cream.   6. RTC 3 weeks after above procedure.     - Dr. Vidal was consulted on the patient and agrees with this plan.    The above plan and management options were discussed at length with patient. Patient is in agreement with the above and verbalized understanding.     Savanna GARCIA  PADMINI Hyatt  07/19/2018

## 2018-07-31 NOTE — OP NOTE
Date of procedure: 07/31/2018    Procedure: Left lower extremity peripheral nerve Coolief thermal radiofrequency ablation    Pre-op diagnosis: M12.88 (arthropathy, unspecified); morbid obesity    Post-op diagnosis: M12.88 (arthropathy, unspecified); morbid obesity    Physician: Dr. Samantha Vidal     Assistant: Dr. Olivas    Anesthestia: local/IV sedation:  Versed 1.5 mg and fentanyl 100 mcg IV.  Conscious sedation provided by MD and monitored by RN.  Total sedation time was less than 45 minutes. (See nurse documentation and case log for sedation time)    EBL: None    Specimens: None    All medications, allergies, and relevant histories were reviewed. No recent antibiotics or infections. A time-out was taken to verify the correct patient, procedure, laterality, and appropriate medications/allergies.     Fluoroscopy-guided left lower extremity peripheral nerve Radiofrequency ablation:    Informed consent obtained for the injection. The right knee was prepped with chlor prep. Using US guidance, the lateral femoral condyle was identified. Xylocaine 1% was infiltrated over the entry site. Under live US, a 17-gauge 7.5 cm Radiofrequency needle provided by Leonela Henry was advanced to the superior aspect of the lateral femoral condyle. This was the repeated at the level of the suprapatellar region, the medial femoral condyle and the medial tibial condyle. These positions correspond to the peripheral nerves innervating the knee joint space.  Fluoroscopy confirmed the position of the needles at the above positions and at the 65% depth point on the shaft in the lateral view.     No motor stimulation was elicited at any level at 2V with a frequency of 2Hz.  All impedances were within the acceptable range.    1cc of 2% lidocaine was injected at each level.  Coolief Thermal RF was then conducted at each level at 60 degrees at the probe, 80 degree lesion, for 2:30 minutes     The needle was then retracted to 50% depth on the  shaft for all needles except the suprapatellar, which was removed at this point after injection 1 mL of a mixture of 3.5 mL of 0.5% Bupivacaine with 5 mg dexamethasone.   1cc of 2% lidocaine was injected at each level.    Coolief Thermal RF was then conducted at each level at 60 degrees at the probe, 80 degree lesion, for 2:30 minutes     1 cc of a mixture of 0.5% bupivacaine with dexamethasone 5 mg was injected at each level.     Special equipment and extra time required due to obesity.    Patient tolerated the procedure well and there were no complications.      Future Management:   If helpful, can repeat as needed.  Follow up with me in 4-6 weeks.      I certify that I provided the above services.  I was present for the entire procedure, which was performed by the fellow physician under my supervision.  There were no parts of the procedure that were performed not by myself or without my direct supervision.

## 2018-07-31 NOTE — DISCHARGE INSTRUCTIONS
Adult Procedural Sedation Instructions    Recovery After Procedural Sedation (Adult)  You have been given medicine by vein to make you sleep during your surgery. This may have included both a pain medicine and sleeping medicine. Most of the effects have worn off. But you may still have some drowsiness for the next 6 to 8 hours.  Home care  Follow these guidelines when you get home:  · For the next 8 hours, you should be watched by a responsible adult. This person should make sure your condition is not getting worse.  · Don't drink any alcohol for the next 24 hours.  · Don't drive, operate dangerous machinery, or make important business or personal decisions during the next 24 hours.  Note: Your healthcare provider may tell you not to take any medicine by mouth for pain or sleep in the next 4 hours. These medicines may react with the medicines you were given in the hospital. This could cause a much stronger response than usual.  Follow-up care  Follow up with your healthcare provider if you are not alert and back to your usual level of activity within 12 hours.  When to seek medical advice  Call your healthcare provider right away if any of these occur:  · Drowsiness gets worse  · Weakness or dizziness gets worse  · Repeated vomiting  · You can't be awakened   Date Last Reviewed: 10/18/2016  © 0547-8792 [employer]. [address] [place] [address], [place] [address]. All rights reserved. This information is not intended as a substitute for professional medical care. Always follow your healthcare professional's instructions.       Thank you for allowing us to care for you today. You may receive a survey about the care we provided. Your feedback is valuable and helps us provide excellent care throughout the community.     Home Care Instructions for Pain Management:    1. DIET:   You may resume your normal diet today.   2. BATHING:   You may shower with luke warm water. No tub baths or anything that will soak  injection sites under water for the next 24 hours.  3. DRESSING:   You may remove your bandage today.   4. ACTIVITY LEVEL:   You may resume your normal activities 24 hrs after your procedure. Nothing strenuous today.  5. MEDICATIONS:   You may resume your normal medications today. To restart blood thinners, ask your doctor.  6. DRIVING    If you have received any sedatives by mouth today, you may not drive for 12 hours.    If you have received any sedation through your IV, you may not drive for 24 hrs.   7. SPECIAL INSTRUCTIONS:   No heat to the injection site for 24 hrs including, hot bath or shower, heating pad, moist heat, or hot tubs.    Use ice pack to injection site for any pain or discomfort.  Apply ice packs for 20 minute intervals as needed.    IF you have diabetes, be sure to monitor your blood sugar more closely. IF your injection contained steroids your blood sugar levels may become higher than normal.    If you are still having pain upon discharge:  Your pain may improve over the next 48 hours. The anesthetic (numbing medication) works immediately to 48 hours. IF your injection contained a steroid (anti-inflammatory medication), it takes approximately 3 days to start feeling relief and 7-10 days to see your greatest results from the medication. It is possible you may need subsequent injections. This would be discussed at your follow up appointment with pain management or your referring doctor.      PLEASE CALL YOUR DOCTOR IF:  1. Redness or swelling around the injection site.  2. Fever of 101 degrees or more  3. Drainage (pus) from the injection site.  4. For any continuous bleeding (some dried blood over the incision is normal.)    FOR EMERGENCIES:   If any unusual problems or difficulties occur during clinic hours, call [phone] or [phone].

## 2018-07-31 NOTE — INTERVAL H&P NOTE
The patient has been examined and the H&P has been reviewed:    I concur with the findings and no changes have occurred since H&P was written.    No change in the location or quality of the pain since the most recent clinic visit.  No new symptoms.  She wishes to proceed with the procedure today.    PE, unchanged from previous:  CV:  RRR  Resp: unlabored, no wheezing.    NPO since MN.    Anesthesia/Surgery risks, benefits and alternative options discussed and understood by patient/family.    I have seen the patient with the fellow physician.  We have come up with the above plan.  The patient is in agreement with our plan.      Active Hospital Problems    Diagnosis  POA    Chronic pain of left knee [M25.562, G89.29]  Yes      Resolved Hospital Problems    Diagnosis Date Resolved POA   No resolved problems to display.

## 2018-08-08 ENCOUNTER — TELEPHONE (OUTPATIENT)
Dept: SMOKING CESSATION | Facility: CLINIC | Age: 68
End: 2018-08-08

## 2018-08-08 ENCOUNTER — TELEPHONE (OUTPATIENT)
Dept: SLEEP MEDICINE | Facility: CLINIC | Age: 68
End: 2018-08-08

## 2018-08-08 DIAGNOSIS — F17.210 LIGHT CIGARETTE SMOKER (1-9 CIGS/DAY): Primary | ICD-10-CM

## 2018-08-08 RX ORDER — IBUPROFEN 200 MG
1 TABLET ORAL DAILY
Qty: 28 PATCH | Refills: 0 | Status: SHIPPED | OUTPATIENT
Start: 2018-08-08 | End: 2018-09-19 | Stop reason: SDUPTHER

## 2018-08-08 NOTE — TELEPHONE ENCOUNTER
Successful contact at [phone]. Following up on quit status. Reports smoking has increased because she was upset and depressed to surgery paper work was messed up. Encouraged patient to take an active role in quit. Will refill patch and schedule appointment on 8/21 after sleep clinic appointment due to she has transportation issues.

## 2018-08-09 ENCOUNTER — OFFICE VISIT (OUTPATIENT)
Dept: SLEEP MEDICINE | Facility: CLINIC | Age: 68
End: 2018-08-09
Payer: MEDICARE

## 2018-08-09 VITALS
WEIGHT: 293 LBS | SYSTOLIC BLOOD PRESSURE: 138 MMHG | DIASTOLIC BLOOD PRESSURE: 82 MMHG | BODY MASS INDEX: 47.09 KG/M2 | HEIGHT: 66 IN | HEART RATE: 74 BPM

## 2018-08-09 DIAGNOSIS — G47.33 OBSTRUCTIVE SLEEP APNEA: ICD-10-CM

## 2018-08-09 PROCEDURE — 99213 OFFICE O/P EST LOW 20 MIN: CPT | Mod: S$GLB,,, | Performed by: NURSE PRACTITIONER

## 2018-08-09 PROCEDURE — 99999 PR PBB SHADOW E&M-EST. PATIENT-LVL III: CPT | Mod: PBBFAC,,, | Performed by: NURSE PRACTITIONER

## 2018-08-09 PROCEDURE — 3008F BODY MASS INDEX DOCD: CPT | Mod: CPTII,S$GLB,, | Performed by: NURSE PRACTITIONER

## 2018-08-09 NOTE — PROGRESS NOTES
Ca Coats  was seen in follow-up for LAURA management and CPAP equipment check.     CHIEF COMPLAINT:    Chief Complaint   Patient presents with    Sleep Apnea     CPAP follow-up      INTERVAL HISTORY:    08/09/2018 PEGGY Jacobo NP: Continues to use CPAP nightly with resolution of interrupted sleep and snoring. Takes naps in the afternoon as a treat and less as a necessity. Currently undergoing bariatric surgery work up at [facility] and hoping to have procedure done August or September. Continues to use FFM with humidity 8/8 with very mild residual oral drying. Tried climate control tubing but does not like how it restricted her movement in the bed, says tubing required she lay still which she did not like, so she resumed use of standard tubing. Oral drying tolerable, just reported since asked. Denies pressure intolerance. Denies congestion. ESS 15.      CPAP Interrogation: Resmed [id number], CPAP 13 cm, Days Used: 30/30, Days > 4 hours: 30/30, Average Usage: 7.2 hours, Used Hours: 215.8, Leak 12L/min, Predicted AHI: 0.2, Machine condition: good       04/09/2018 PEGGY Jacobo NP: Initial HISTORY OF PRESENT ILLNESS: Ca Coats is a 67 y.o. female is here for sleep evaluation.       Previous [facility] patient. Here to establish care and CPAP compliance check after set up of new machine January 2018  No sleep records for my review today    Patient complaints include: interrupted sleep and snoring, resolved with CPAP use. Although still having a little of sleep interruption.   Nocturia x1   Some ongoing fatigue, with known anemia being managed by PCP, due for f/u 4/18/18    Takes Neurontin for chronic leg pain    CPAP Interrogation: Resmed [id number], CPAP 13 cm, Days Used: 30/30, Days > 4 hours: 29/30, Average Usage: 5.8 hours, Used Hours: 174.3, Leak 12L/min, Predicted AHI: 0.2, Machine condition: good     Denies oral/nasal drying with Resmed AirFit F20. Humidity 8/8. Reports dry lips with CPAP  "use.   Denies pressure intolerance    Denies symptoms of restless legs or kicking during sleep.    Occupation: retired     [place] Sleepiness Scale score during initial sleep evaluation was 15.    SLEEP ROUTINE:      Bed partner:  none  Time to bed:  10-10:30 am  Sleep onset latency: "long time"     Disruptions or awakenings: 3 or more     Wakeup time:   6 am  Perceived sleep quality:  Poor      Previous Sleep Studies:  Received external sleep records 04/09/2018 at 1:47, handed to me by Vandana Kenny MA  02/01/2013 PSG AHI 9.2 with oxygen sathish 84%.   03/29/2013 Titratrion study CPAP 13 cm          PAST MEDICAL HISTORY:    Active Ambulatory Problems     Diagnosis Date Noted    Chronic pain 05/08/2018    Chronic pain of left knee 07/10/2018     Resolved Ambulatory Problems     Diagnosis Date Noted    No Resolved Ambulatory Problems     Past Medical History:   Diagnosis Date    GERD (gastroesophageal reflux disease)     Hypertension                 PAST SURGICAL HISTORY:    Past Surgical History:   Procedure Laterality Date    APPENDECTOMY      cataract surgery   2017    INJECTION OF ANESTHETIC AGENT AROUND NERVE Left 7/10/2018    Procedure: BLOCK, NERVE;  Surgeon: Samantha Vidal MD;  Location: [facility];  Service: Pain Management;  Laterality: Left;  Genicular          FAMILY HISTORY:                Family History   Problem Relation Age of Onset    No Known Problems Mother     No Known Problems Father        SOCIAL HISTORY:          Tobacco:   History   Smoking Status    Current Every Day Smoker    Packs/day: 0.25    Years: 50.00    Types: Cigarettes   Smokeless Tobacco    Never Used       Alcohol use:    History   Alcohol Use    Yes     Comment: rarely                  ALLERGIES:  Review of patient's allergies indicates:  No Known Allergies    CURRENT MEDICATIONS:    Current Outpatient Prescriptions   Medication Sig Dispense Refill    amLODIPine (NORVASC) 5 MG tablet       B-complex with " "vitamin C (Z-BEC OR EQUIV) tablet       gabapentin (NEURONTIN) 300 MG capsule       lisinopril (PRINIVIL,ZESTRIL) 40 MG tablet       multivitamin with minerals tablet       nicotine (NICODERM CQ) 14 mg/24 hr Place 1 patch onto the skin once daily. 28 patch 0    nicotine polacrilex (NICORETTE) 4 MG Gum Take 1 each (4 mg total) by mouth as needed. 6-9 pieces as needed daily to replace cigarette 100 each 0    omeprazole (PRILOSEC) 40 MG capsule       oxyCODONE-acetaminophen (PERCOCET) 5-325 mg per tablet        No current facility-administered medications for this visit.                   REVIEW OF SYSTEMS:     Sleep related symptoms as per HPI.  CONST:Denies weight gain    HEENT: Denies sinus congestion  PULM: Sometimes dyspnea  CARD:  Denies palpitations   GI:  Reports acid reflux  : Denies polyuria  NEURO: Denies headaches  PSYCH: Sometimes mood disturbance  HEME: Denies anemia    Otherwise, a balance of systems reviewed is negative.          PHYSICAL EXAM:  /82 (BP Location: Left arm, Patient Position: Sitting, BP Method: Large (Manual))   Pulse 74   Ht 5' 6" (1.676 m)   Wt (!) 156.5 kg (345 lb)   BMI 55.68 kg/m²    GENERAL: Obese body habitus, well groomed  NECK: Supple. No thyromegaly. No palpable nodes.    SKIN: On face and neck: No abrasions, no rashes, no lesions.  No subcutaneous nodules are palpable.  RESPIRATORY:  Normal chest expansion and non-labored breathing at rest.  CARDIOVASCULAR: Normal rate  EXTREMITIES: No edema. No clubbing. No cyanosis. Station normal. Gait normal.        NEURO/PSYCH: Oriented to time, place and person. Normal attention span and concentration. Affect is full. Mood is normal.                                              ASSESSMENT:    Obstructive sleep apnea, mild by AHI. The patient symptomatically has snoring and interrupted sleep resolved with CPAP. The patient is adherent on CPAP and experiencing symptomatic benefit. Medical co-morbidities: systemic HTN, " GERD, and obesity.  This warrants treatment for obstructive sleep apnea.      Current every day smoker, actively in contact with Smoking Cessation Program at [facility], but has not yet quit     Obesity, BMI > 50, discussed relationship between LAURA and weight; undergoing bariatric surgery work up at [facility]     PLAN:    Treatment:    -Continue CPAP 13 cm.   -Long term goal of continued weight loss and retesting for sleep apnea at new weight plateau; if significant weight loss before annual visit, pt to call clinic for sleep study order.   -RTC 12 months, sooner if needed.     Education: During our discussion today, we talked about the etiology of obstructive sleep apnea as well as the potential ramifications of untreated sleep apnea, which could include daytime sleepiness, hypertension, heart disease and/or stroke. We discussed potential treatment options, which could include weight loss, body positioning, continuous positive airway pressure (CPAP), OA, EPAP, or referral for surgical consideration.      Precautions: The patient was advised to abstain from driving should they feel sleepy  or drowsy - pt does not drive

## 2018-08-10 ENCOUNTER — CLINICAL SUPPORT (OUTPATIENT)
Dept: SMOKING CESSATION | Facility: CLINIC | Age: 68
End: 2018-08-10
Payer: COMMERCIAL

## 2018-08-10 DIAGNOSIS — F17.200 NICOTINE DEPENDENCE: Primary | ICD-10-CM

## 2018-08-10 PROCEDURE — 99407 BEHAV CHNG SMOKING > 10 MIN: CPT | Mod: S$GLB,,,

## 2018-08-10 NOTE — PROGRESS NOTES
Called pt to f/u on her 3 month smoking cessation quit status. Pt stated she is still smoking, but she is still actively enrolled in program and working on her quit.

## 2018-08-21 ENCOUNTER — OFFICE VISIT (OUTPATIENT)
Dept: PAIN MEDICINE | Facility: CLINIC | Age: 68
End: 2018-08-21
Payer: MEDICARE

## 2018-08-21 VITALS
TEMPERATURE: 98 F | BODY MASS INDEX: 55.68 KG/M2 | SYSTOLIC BLOOD PRESSURE: 142 MMHG | RESPIRATION RATE: 18 BRPM | HEART RATE: 82 BPM | DIASTOLIC BLOOD PRESSURE: 74 MMHG | HEIGHT: 66 IN

## 2018-08-21 DIAGNOSIS — E66.01 MORBID OBESITY WITH BMI OF 50.0-59.9, ADULT: ICD-10-CM

## 2018-08-21 DIAGNOSIS — M17.12 PRIMARY OSTEOARTHRITIS OF LEFT KNEE: ICD-10-CM

## 2018-08-21 DIAGNOSIS — M25.562 CHRONIC PAIN OF LEFT KNEE: Primary | ICD-10-CM

## 2018-08-21 DIAGNOSIS — G89.29 CHRONIC PAIN OF LEFT KNEE: Primary | ICD-10-CM

## 2018-08-21 PROCEDURE — 99999 PR PBB SHADOW E&M-EST. PATIENT-LVL III: CPT | Mod: PBBFAC,,, | Performed by: NURSE PRACTITIONER

## 2018-08-21 PROCEDURE — 99213 OFFICE O/P EST LOW 20 MIN: CPT | Mod: S$GLB,,, | Performed by: NURSE PRACTITIONER

## 2018-08-21 PROCEDURE — 3008F BODY MASS INDEX DOCD: CPT | Mod: CPTII,S$GLB,, | Performed by: NURSE PRACTITIONER

## 2018-08-21 NOTE — PROGRESS NOTES
"Subjective:     Patient ID: Ca Coats is a 67 y.o. female.    Chief Complaint: Pain    Consulted by: Self      Disclaimer: This note was generated using voice recognition software.  There may be a typographical errors that were missed during proofreading.      HPI:    Ca Coats is a 67 y.o. female who presents today with left knee pain. Her pain has been going on for "too long."  She was previously treated by Dr. Iyer at [facility].  She has injections [id number] with him.  These were helpful, but she does not know if the injections were steroid or viscosupplementation.   This pain is described in detail below.    Interval History (4/30/2018):  The patient returns to clinic today for follow up and records review. We did received records from [facility] including a MRI of her knee. Dr. Iyer's last office visit note from January 2017 does not include any previous injections. She continues to report bilateral knee pain, left greater than right. She describes this pain as constant and aching. This pain is worse with prolonged walking. She also report right shoulder pain with prolonged overhead activity. She continues to take Percocet with benefit. She denies any other health changes.    Interval History (5/31/2018):  The patient returns to clinic for follow up. She is s/p left knee Synvisc One injection on 5/8/2018. She reports 35% relief of her knee pain. She continues to report bilateral knee pain, left greater than right. She describes this pain as constant and aching. Her pain is worse with prolonged walking and standing. She is scheduled to see Orthopedics at the  End of this month. She is also following up with Bariatrics to discuss the lap band procedure. She continues to take Percocet with benefit. She also uses Voltaren gel with benefit but this is expensive for her. She denies any other health changes.         Interval History (7/2/2018):  The patient returns to clinic today for " follow up. She continues to report left knee pain that is constant, sharp, and aching in nature. Her pain is worse with prolonged walking and standing. She is scheduled for weight loss surgery in August. She continues follow up with orthopedics who does not recommend surgery at this time due to her BMI. She continues to take Percocet as needed with benefit. She denies any other health changes.     Interval History (7/19/2018):  The patient returns to clinic today for follow up. She is s/p left genicular nerve block on 7/10/2018. She reports 80% relief of her left knee pain. She reports that she was able to walk for longer distances (3 blocks) after the block. Her pain has returned to baseline. She continues to report left knee pain that is constant, sharp, and aching in nature. Her pain is worse with prolonged walking and standing. She denies any other health changes.     Interval History (8/21/2018):  The patient returns to clinic today for follow up. She is s/p left genicular cooled RFA on 7/31/2018. She reports 60% relief of her left knee pain. She reports intermittent knee pain that is sore and aching in nature. She continues to perform a home exercise routine. She is actively trying to lose weight. She continues to follow up with Bariatric Surgery at [facility]. She is considering weight loss surgery. She continues to use compound cream with benefit. She denies any other health changes.     Physical Therapy: Yes, she did this in 2018 for one month (twice a week). She does HEP (stretching and ROM in the morning)    Non-pharmacologic Treatment:     · Ice/Heat: Heat is more helpful than ice  · TENS: Not tried  · Massage: Not tried  · Chiropractic care: Not tried  · Acupuncture: Not tried  · Other: Uses a cane         Pain Medications:         · Currently taking: Gabapentin 300 mg/600 mg, Percocet 5/325 mg BID PRN    · Has tried in the past:  Ibuprofen (still takes sometimes), Voltaren gel    · Has not tried: Tylenol,  Muscle relaxants, TCAs, SNRIs, Lyrica, compound topical creams    Blood thinners: None    Interventional Therapies: [id number] injections by Dr. Iyer were helpful  · 7/10/2018- Left genicular nerve block  · 7/31/2018- Left genicular cooled RFA    Relevant Surgeries: None    Affecting sleep? No    Affecting daily activities? Yes    Depressive symptoms? No          · SI/HI? No    Work status: No, on disability due to her knee    Prescription Monitoring Program database:  Reviewed and consistent with medication use as prescribed.    Pain Scales  Best: 3/10  Worst: 10/10  Usually: 3/10  Today: 6/10    Knee Pain    The pain is present in the left knee. This is a chronic problem. The current episode started more than 1 year ago. The problem occurs daily. The problem has been gradually worsening. The quality of the pain is described as aching, burning, sharp, tight and numb. The pain is at a severity of 8/10. Associated symptoms include an inability to bear weight. The symptoms are aggravated by walking and standing. She has tried injection treatment, exercise, cold, heat, movement and oral narcotics for the symptoms. The treatment provided mild relief. Physical therapy was effective.      Last 3 PDI Scores 7/19/2018 7/2/2018 5/31/2018   Pain Disability Index (PDI) 40 [phone]   ]    Review of Systems   Musculoskeletal: Positive for joint pain.        Left knee pain             Past Medical History:   Diagnosis Date    GERD (gastroesophageal reflux disease)     Hypertension        Past Surgical History:   Procedure Laterality Date    APPENDECTOMY      cataract surgery   2017       Review of patient's allergies indicates:  No Known Allergies    Current Outpatient Medications   Medication Sig Dispense Refill    amLODIPine (NORVASC) 5 MG tablet       B-complex with vitamin C (Z-BEC OR EQUIV) tablet       gabapentin (NEURONTIN) 300 MG capsule       lisinopril (PRINIVIL,ZESTRIL) 40 MG tablet       multivitamin with  "minerals tablet       nicotine (NICODERM CQ) 14 mg/24 hr Place 1 patch onto the skin once daily. 28 patch 0    nicotine polacrilex (NICORETTE) 4 MG Gum Take 1 each (4 mg total) by mouth as needed. 6-9 pieces as needed daily to replace cigarette 100 each 0    omeprazole (PRILOSEC) 40 MG capsule       oxyCODONE-acetaminophen (PERCOCET) 5-325 mg per tablet        No current facility-administered medications for this visit.        Family History   Problem Relation Age of Onset    No Known Problems Mother     No Known Problems Father        Social History     Socioeconomic History    Marital status: Single     Spouse name: Not on file    Number of children: Not on file    Years of education: Not on file    Highest education level: Not on file   Social Needs    Financial resource strain: Not on file    Food insecurity - worry: Not on file    Food insecurity - inability: Not on file    Transportation needs - medical: Not on file    Transportation needs - non-medical: Not on file   Occupational History    Not on file   Tobacco Use    Smoking status: Current Every Day Smoker     Packs/day: 0.25     Years: 50.00     Pack years: 12.50     Types: Cigarettes    Smokeless tobacco: Never Used   Substance and Sexual Activity    Alcohol use: Yes     Comment: rarely     Drug use: No    Sexual activity: Not on file   Other Topics Concern    Not on file   Social History Narrative    Not on file       Objective:     Vitals:    08/21/18 1106   BP: (!) 142/74   Pulse: 82   Resp: 18   Temp: 98.3 °F (36.8 °C)   TempSrc: Oral   Height: 5' 6" (1.676 m)   PainSc:   6       GEN:  Well developed, well nourished.  No acute distress. No pain behavior.  HEENT:  No trauma.  Mucous membranes moist.  Nares patent bilaterally.  PSYCH: Normal affect. Thought content appropriate.  CHEST:  Breathing symmetric.  No audible wheezing.  ABD:   · Soft, non-distended.    SKIN:  Warm, pink, dry.  No rash on exposed areas.    EXT:  No " cyanosis, clubbing, or edema.  No color change or changes in nail or hair growth.  NEURO/MUSCULOSKELETAL:  Fully alert, oriented, and appropriate. Speech normal francesca. No cranial nerve deficits.   Gait: Normal.     Motor Strength: 5/5 motor strength throughout lower extremities.     Right knee  Inspection: No erythema, swelling, or redness  ROM: Full with pain on extension.   Palpation: No pes anserine tenderness and positive crepitus. No patellar tendon tenderness and No patellofemoral tendon tenderness.  No instability on exam.    Left knee  Inspection: No erythema, swelling, or redness  ROM: Full with pain on extension.   Palpation: Positive pes anserine tenderness and Positive crepitus. Positive patellar tendon tenderness and Positive patellofemoral tendon tenderness.  No instability on exam.      Imaging:      MRI Left Knee 7/21/2017 (in media):  The medial meniscus is intact. The lateral meniscus is intact.     A chronic complete ACL tear is noted. The posterior cruciate ligament is intact. The medial and lateral retinacula are intact. The medial collateral ligament is intact. The lateral collateral ligament in intact. The posterolateral corner structures are intact.     There is full thickness fissuring of the central aspect medial femoral cartilage. The lateral tibiofemoral compartment cartilage is intact. There is broad thickness defect within the central trochlear cartilage. There is mild lateral patellofemoral cartilage thinning and regional full thickness loss of the central superior patellar cartilage.     A small effusion is present. There is trace infrapatellar fluid. Tiny popliteal cyst is noted. Subtle focal marrow edema is seen within the posterior tibial plateau. The bone marrow signal is heterogeneous likely reflecting marrow reconversion.     There is trace pes anserine bursitis. The tendons are otherwise normal.     Grade 2 fatty streaking of the semimembranosus and vastus lateralis muscles  are noted. There is mild prepatellar edema.     MRI Right Shoulder 7/21/2017 (in media):   There is a complete tear of the supraspinatus tendon with retraction to the glenohumeral joint. There is partial tearing of the anterior infraspinatus articular surface. Mild subscapularis tendinosis is noted. The teres minor is intact. A small amount of subacromial/subdeltoid fluid is noted. The proximal long head biceps tendon is poorly visualized proximally although intact distally.     Degenerative tearing of the anterosuperior through posterosuperior labrum is noted. There is moderate to severe superior glenoid cartilage thinning with subchondral degenerative changes. The glenohumeral ligaments appear grossly intact.     An os acromiale is seen with fluid and osteophytosis at its junction with the base of the acromion. There is moderate superior subluxation of the AC joint with mild osteophytosis.     Small effusion with subcoracoid extension is noted.     There is grade 2/3 fatty atrophy of the supraspinatus muscle, grade 2 of the infraspinatus and subscapularis. Heterogeneous signal is seen throughout the bone marrow likely reflecting marrow reconversion.       Assessment:     Encounter Diagnoses   Name Primary?    Chronic pain of left knee Yes    Primary osteoarthritis of left knee     Morbid obesity with BMI of 50.0-59.9, adult        Plan:     Ca was seen today for follow-up.    Diagnoses and all orders for this visit:    Chronic pain of left knee    Primary osteoarthritis of left knee    Morbid obesity with BMI of 50.0-59.9, adult       Her pain is consistent with the above.    We discussed the assessment and recommendations.  All available images were reviewed. We discussed the disease process, prognosis, treatment plan, and risks and benefits. The patient is aware of the risks and benefits of the medications being prescribed, common side effects, and proper usage. The following is the plan we agreed on:      1. She is s/p left genicular cooled RFA.   2. In the future, we can consider right genicular nerve block.   3. Continue weight loss strategies.   4. Continue Percocet per PCP.   5. Continue compound cream.   6. RTC in 3 months or sooner if needed.     - Dr. Vidal was consulted on the patient and agrees with this plan.    The above plan and management options were discussed at length with patient. Patient is in agreement with the above and verbalized understanding.     Savanna Hyatt NP  08/21/2018

## 2018-09-14 ENCOUNTER — CLINICAL SUPPORT (OUTPATIENT)
Dept: SMOKING CESSATION | Facility: CLINIC | Age: 68
End: 2018-09-14
Payer: COMMERCIAL

## 2018-09-14 DIAGNOSIS — F17.200 NICOTINE DEPENDENCE: Primary | ICD-10-CM

## 2018-09-14 PROCEDURE — 99407 BEHAV CHNG SMOKING > 10 MIN: CPT | Mod: S$GLB,,,

## 2018-09-14 NOTE — PROGRESS NOTES
Received call from patient stating she needed refill for patches. Informed patient her benefits had , but we could renew them and get her enrolled for quit 2. Pt is still smoking and wants to rejoin program. Completed 6 month smoking cessation smart form and  scheduled her for intake #2 on 2018 for noon. Informed patient of benefit period and future phone f/up's.

## 2018-09-19 ENCOUNTER — OFFICE VISIT (OUTPATIENT)
Dept: OPTOMETRY | Facility: CLINIC | Age: 68
End: 2018-09-19
Payer: MEDICARE

## 2018-09-19 ENCOUNTER — CLINICAL SUPPORT (OUTPATIENT)
Dept: SMOKING CESSATION | Facility: CLINIC | Age: 68
End: 2018-09-19
Payer: COMMERCIAL

## 2018-09-19 DIAGNOSIS — H40.013 OPEN ANGLE WITH BORDERLINE FINDINGS OF BOTH EYES: ICD-10-CM

## 2018-09-19 DIAGNOSIS — H26.40 SECONDARY CATARACT OF RIGHT EYE, UNSPECIFIED SECONDARY CATARACT TYPE: ICD-10-CM

## 2018-09-19 DIAGNOSIS — H04.123 DRY EYE SYNDROME OF BOTH EYES: ICD-10-CM

## 2018-09-19 DIAGNOSIS — F17.200 NICOTINE DEPENDENCE: Primary | ICD-10-CM

## 2018-09-19 DIAGNOSIS — F17.210 LIGHT CIGARETTE SMOKER (1-9 CIGS/DAY): ICD-10-CM

## 2018-09-19 DIAGNOSIS — Z96.1 PSEUDOPHAKIA OF BOTH EYES: ICD-10-CM

## 2018-09-19 DIAGNOSIS — I10 ESSENTIAL HYPERTENSION: Primary | ICD-10-CM

## 2018-09-19 PROCEDURE — 92004 COMPRE OPH EXAM NEW PT 1/>: CPT | Mod: S$PBB,,, | Performed by: OPTOMETRIST

## 2018-09-19 PROCEDURE — 99212 OFFICE O/P EST SF 10 MIN: CPT | Mod: PBBFAC | Performed by: OPTOMETRIST

## 2018-09-19 PROCEDURE — 99999 PR PBB SHADOW E&M-EST. PATIENT-LVL II: CPT | Mod: PBBFAC,,, | Performed by: OPTOMETRIST

## 2018-09-19 PROCEDURE — 99403 PREV MED CNSL INDIV APPRX 45: CPT | Mod: S$GLB,,,

## 2018-09-19 PROCEDURE — 99999 PR PBB SHADOW E&M-EST. PATIENT-LVL I: CPT | Mod: PBBFAC,,,

## 2018-09-19 RX ORDER — IBUPROFEN 200 MG
1 TABLET ORAL DAILY
Qty: 28 PATCH | Refills: 0 | Status: SHIPPED | OUTPATIENT
Start: 2018-09-19 | End: 2018-12-05 | Stop reason: SDUPTHER

## 2018-09-19 NOTE — PROGRESS NOTES
Assessment /Plan     For exam results, see Encounter Report.    Essential hypertension    Open angle with borderline findings of both eyes    Dry eye syndrome of both eyes    Secondary cataract of right eye, unspecified secondary cataract type    Pseudophakia of both eyes            1.  No retinopathy--monitor yearly.  BP control.  2.  Due to increased c/d ratio R>L.  RTC 1 month for glaucoma work-up.  3.  Recommend artificial tears 2-3x/day OU.  3-4.  Patient did not know that her IOLs are multifocals.  Feel they are causing a lot of her vision problems.  Will recheck rx at next visit.

## 2018-09-19 NOTE — PROGRESS NOTES
Patient presents to clinic for Quit attempt #2. She reports smoking <10 cpd. Pt stated she is scheduled to have lap band surgery next week. Due to limited transportation she agreed to receive calls and schedule follow up appointments with scheduled clinic visits. Will begin the prescribed tobacco cessation medication regime of 14 mg nicotine patch. Educated patient how to use and side effects of medications. Will f/u with patient  in 3 weeks via telephone call.

## 2018-10-18 ENCOUNTER — OFFICE VISIT (OUTPATIENT)
Dept: OPTOMETRY | Facility: CLINIC | Age: 68
End: 2018-10-18
Payer: MEDICARE

## 2018-10-18 ENCOUNTER — CLINICAL SUPPORT (OUTPATIENT)
Dept: OPHTHALMOLOGY | Facility: CLINIC | Age: 68
End: 2018-10-18
Payer: MEDICARE

## 2018-10-18 DIAGNOSIS — Z96.1 PSEUDOPHAKIA OF BOTH EYES: ICD-10-CM

## 2018-10-18 DIAGNOSIS — H40.013 OPEN ANGLE WITH BORDERLINE FINDINGS OF BOTH EYES: Primary | ICD-10-CM

## 2018-10-18 PROCEDURE — 99999 PR PBB SHADOW E&M-EST. PATIENT-LVL II: CPT | Mod: PBBFAC,,, | Performed by: OPTOMETRIST

## 2018-10-18 PROCEDURE — 92020 GONIOSCOPY: CPT | Mod: S$PBB,,, | Performed by: OPTOMETRIST

## 2018-10-18 PROCEDURE — 76514 ECHO EXAM OF EYE THICKNESS: CPT | Mod: PBBFAC | Performed by: OPTOMETRIST

## 2018-10-18 PROCEDURE — 92083 EXTENDED VISUAL FIELD XM: CPT | Mod: PBBFAC | Performed by: OPTOMETRIST

## 2018-10-18 PROCEDURE — 76514 ECHO EXAM OF EYE THICKNESS: CPT | Mod: 26,S$PBB,, | Performed by: OPTOMETRIST

## 2018-10-18 PROCEDURE — 92133 CPTRZD OPH DX IMG PST SGM ON: CPT | Mod: PBBFAC | Performed by: OPTOMETRIST

## 2018-10-18 PROCEDURE — 92020 GONIOSCOPY: CPT | Mod: PBBFAC | Performed by: OPTOMETRIST

## 2018-10-18 PROCEDURE — 99212 OFFICE O/P EST SF 10 MIN: CPT | Mod: PBBFAC,25 | Performed by: OPTOMETRIST

## 2018-10-18 PROCEDURE — 92012 INTRM OPH EXAM EST PATIENT: CPT | Mod: S$PBB,,, | Performed by: OPTOMETRIST

## 2018-10-18 RX ORDER — PANTOPRAZOLE SODIUM 40 MG/1
40 TABLET, DELAYED RELEASE ORAL 2 TIMES DAILY
COMMUNITY
Start: 2018-10-02 | End: 2019-12-10 | Stop reason: SDUPTHER

## 2018-10-18 RX ORDER — NICOTINE POLACRILEX 2 MG
1 LOZENGE BUCCAL DAILY
COMMUNITY
Start: 2018-10-16 | End: 2019-07-19

## 2018-10-18 RX ORDER — SUCRALFATE 1 G/1
1 TABLET ORAL
COMMUNITY
Start: 2018-10-13 | End: 2019-12-10 | Stop reason: SDUPTHER

## 2018-10-18 NOTE — PROGRESS NOTES
HPI     Glaucoma Suspect      Additional comments: Glaucoma eval              Comments     Last eye exam was 9/19/18 with Dr. Ahmadi.  Pt presents for glaucoma workup          Last edited by Isabel Ahmadi, OD on 10/18/2018 10:53 AM. (History)            Assessment /Plan     For exam results, see Encounter Report.    Open angle with borderline findings of both eyes    Pseudophakia of both eyes          1.  Due to increased c/d ratio R>L.  No signs of glaucoma today.  Visual fields unreliable.  OCT normal OU.    HVF: 10/18/18  OCT: 10/18/18  DFE: 9/19/18  Photos:  Gonio: 10/18/18  Pachy: 492 OD, 492 OS  Initial IOPs: 12 OD, 10 OS  MHx: HTN  FHx: None  2. Bifocal rx given                       RTC 6 months for IOP check.

## 2018-11-21 ENCOUNTER — OFFICE VISIT (OUTPATIENT)
Dept: PAIN MEDICINE | Facility: CLINIC | Age: 68
End: 2018-11-21
Payer: MEDICARE

## 2018-11-21 VITALS
SYSTOLIC BLOOD PRESSURE: 126 MMHG | BODY MASS INDEX: 47.09 KG/M2 | HEIGHT: 66 IN | HEART RATE: 86 BPM | DIASTOLIC BLOOD PRESSURE: 77 MMHG | WEIGHT: 293 LBS

## 2018-11-21 DIAGNOSIS — M17.12 PRIMARY OSTEOARTHRITIS OF LEFT KNEE: ICD-10-CM

## 2018-11-21 DIAGNOSIS — E66.01 MORBID OBESITY WITH BMI OF 50.0-59.9, ADULT: ICD-10-CM

## 2018-11-21 DIAGNOSIS — G89.4 CHRONIC PAIN DISORDER: ICD-10-CM

## 2018-11-21 DIAGNOSIS — M25.562 CHRONIC PAIN OF LEFT KNEE: Primary | ICD-10-CM

## 2018-11-21 DIAGNOSIS — G89.29 CHRONIC PAIN OF LEFT KNEE: Primary | ICD-10-CM

## 2018-11-21 PROCEDURE — 1101F PT FALLS ASSESS-DOCD LE1/YR: CPT | Mod: CPTII,S$GLB,, | Performed by: NURSE PRACTITIONER

## 2018-11-21 PROCEDURE — 99213 OFFICE O/P EST LOW 20 MIN: CPT | Mod: 25,S$GLB,, | Performed by: NURSE PRACTITIONER

## 2018-11-21 PROCEDURE — 3008F BODY MASS INDEX DOCD: CPT | Mod: CPTII,S$GLB,, | Performed by: NURSE PRACTITIONER

## 2018-11-21 PROCEDURE — 99999 PR PBB SHADOW E&M-EST. PATIENT-LVL III: CPT | Mod: PBBFAC,,, | Performed by: NURSE PRACTITIONER

## 2018-11-21 PROCEDURE — 96372 THER/PROPH/DIAG INJ SC/IM: CPT | Mod: S$GLB,,, | Performed by: NURSE PRACTITIONER

## 2018-11-21 RX ORDER — KETOROLAC TROMETHAMINE 30 MG/ML
30 INJECTION, SOLUTION INTRAMUSCULAR; INTRAVENOUS
Status: COMPLETED | OUTPATIENT
Start: 2018-11-21 | End: 2018-11-21

## 2018-11-21 RX ADMIN — KETOROLAC TROMETHAMINE 30 MG: 30 INJECTION, SOLUTION INTRAMUSCULAR; INTRAVENOUS at 11:11

## 2018-11-21 NOTE — H&P (VIEW-ONLY)
"Subjective:     Patient ID: Ca Coats is a 67 y.o. female.    Chief Complaint: Pain    Consulted by: Self      Disclaimer: This note was generated using voice recognition software.  There may be a typographical errors that were missed during proofreading.      HPI:    Ca Coats is a 67 y.o. female who presents today with left knee pain. Her pain has been going on for "too long."  She was previously treated by Dr. Iyer at [facility].  She has injections [id number] with him.  These were helpful, but she does not know if the injections were steroid or viscosupplementation.   This pain is described in detail below.    Interval History (4/30/2018):  The patient returns to clinic today for follow up and records review. We did received records from [facility] including a MRI of her knee. Dr. Iyer's last office visit note from January 2017 does not include any previous injections. She continues to report bilateral knee pain, left greater than right. She describes this pain as constant and aching. This pain is worse with prolonged walking. She also report right shoulder pain with prolonged overhead activity. She continues to take Percocet with benefit. She denies any other health changes.    Interval History (5/31/2018):  The patient returns to clinic for follow up. She is s/p left knee Synvisc One injection on 5/8/2018. She reports 35% relief of her knee pain. She continues to report bilateral knee pain, left greater than right. She describes this pain as constant and aching. Her pain is worse with prolonged walking and standing. She is scheduled to see Orthopedics at the  End of this month. She is also following up with Bariatrics to discuss the lap band procedure. She continues to take Percocet with benefit. She also uses Voltaren gel with benefit but this is expensive for her. She denies any other health changes.         Interval History (7/2/2018):  The patient returns to clinic today for " "follow up. She continues to report left knee pain that is constant, sharp, and aching in nature. Her pain is worse with prolonged walking and standing. She is scheduled for weight loss surgery in August. She continues follow up with orthopedics who does not recommend surgery at this time due to her BMI. She continues to take Percocet as needed with benefit. She denies any other health changes.     Interval History (7/19/2018):  The patient returns to clinic today for follow up. She is s/p left genicular nerve block on 7/10/2018. She reports 80% relief of her left knee pain. She reports that she was able to walk for longer distances (3 blocks) after the block. Her pain has returned to baseline. She continues to report left knee pain that is constant, sharp, and aching in nature. Her pain is worse with prolonged walking and standing. She denies any other health changes.     Interval History (8/21/2018):  The patient returns to clinic today for follow up. She is s/p left genicular cooled RFA on 7/31/2018. She reports 60% relief of her left knee pain. She reports intermittent knee pain that is sore and aching in nature. She continues to perform a home exercise routine. She is actively trying to lose weight. She continues to follow up with Bariatric Surgery at [facility]. She is considering weight loss surgery. She continues to use compound cream with benefit. She denies any other health changes.     Interval History (11/21/2018):  The patient returns to clinic today for follow up. She reports increased left knee pain this past week. She reports 2 episodes where her knee has "locked" up on her while walking. She describes her knee pain as sore and aching. She is actively trying to lose weight and quit smoking. She continues to use the compound cream with benefit. She denies any other health changes.     Physical Therapy: Yes, she did this in 2018 for one month (twice a week). She does HEP (stretching and ROM in the " morning)    Non-pharmacologic Treatment:     · Ice/Heat: Heat is more helpful than ice  · TENS: Not tried  · Massage: Not tried  · Chiropractic care: Not tried  · Acupuncture: Not tried  · Other: Uses a cane         Pain Medications:         · Currently taking: Gabapentin 300 mg/600 mg, Percocet 5/325 mg BID PRN    · Has tried in the past:  Ibuprofen (still takes sometimes), Voltaren gel    · Has not tried: Tylenol, Muscle relaxants, TCAs, SNRIs, Lyrica, compound topical creams    Blood thinners: None    Interventional Therapies: [id number] injections by Dr. Iyer were helpful  · 7/10/2018- Left genicular nerve block  · 7/31/2018- Left genicular cooled RFA    Relevant Surgeries: None    Affecting sleep? No    Affecting daily activities? Yes    Depressive symptoms? No          · SI/HI? No    Work status: No, on disability due to her knee    Prescription Monitoring Program database:  Reviewed and consistent with medication use as prescribed.    Pain Scales  Best: 3/10  Worst: 10/10  Usually: 3/10  Today: 10/10    Knee Pain    The pain is present in the left knee. This is a chronic problem. The current episode started more than 1 year ago. The problem occurs daily. The problem has been gradually worsening. The quality of the pain is described as aching, burning, sharp, tight and numb. The pain is at a severity of 8/10. Associated symptoms include an inability to bear weight. The symptoms are aggravated by walking and standing. She has tried injection treatment, exercise, cold, heat, movement and oral narcotics for the symptoms. The treatment provided mild relief. Physical therapy was effective.  Leg Pain    Associated symptoms include an inability to bear weight.       Last 3 PDI Scores 11/21/2018 7/19/2018 7/2/2018   Pain Disability Index (PDI) [phone]   ]    Review of Systems   Musculoskeletal: Positive for joint pain.        Left knee pain             Past Medical History:   Diagnosis Date    Cataract     Dry  eye syndrome     GERD (gastroesophageal reflux disease)     Glaucoma suspect     Hypertension        Past Surgical History:   Procedure Laterality Date    [id number] Left 7/31/2018    Per[facility] at [facility]    APPENDECTOMY      BLOCK, NERVE Left 7/10/2018    [facility] at [facility]    CATARACT EXTRACTION W/  INTRAOCULAR LENS IMPLANT Bilateral     MFIOL OU    cataract surgery   2017    INJECTION OF ANESTHETIC AGENT AROUND NERVE Left 7/10/2018    Procedure: BLOCK, NERVE;  Surgeon: Samantha Vidal MD;  Location: [facility];  Service: Pain Management;  Laterality: Left;  Genicular     INJECTION-JOINT Left 5/8/2018    Performed by Samantha Vidal MD at [facility]       Review of patient's allergies indicates:  No Known Allergies    Current Outpatient Medications   Medication Sig Dispense Refill    amLODIPine (NORVASC) 5 MG tablet       B-complex with vitamin C (Z-BEC OR EQUIV) tablet       CALCIUM CITRATE-VITAMIN D2 ORAL       gabapentin (NEURONTIN) 300 MG capsule       lisinopril (PRINIVIL,ZESTRIL) 40 MG tablet       multivitamin with minerals tablet       nicotine (NICODERM CQ) 14 mg/24 hr Place 1 patch onto the skin once daily. 28 patch 0    nicotine polacrilex (NICORETTE) 4 MG Gum Take 1 each (4 mg total) by mouth as needed. 6-9 pieces as needed daily to replace cigarette 100 each 0    omeprazole (PRILOSEC) 40 MG capsule       oxyCODONE-acetaminophen (PERCOCET) 5-325 mg per tablet       pantoprazole (PROTONIX) 40 MG tablet       sucralfate (CARAFATE) 1 gram tablet       VITAMIN B-12 5,000 mcg Subl        No current facility-administered medications for this visit.        Family History   Problem Relation Age of Onset    No Known Problems Mother     No Known Problems Father        Social History     Socioeconomic History    Marital status: Single     Spouse name: Not on file    Number of children: Not on file  "   Years of education: Not on file    Highest education level: Not on file   Social Needs    Financial resource strain: Not on file    Food insecurity - worry: Not on file    Food insecurity - inability: Not on file    Transportation needs - medical: Not on file    Transportation needs - non-medical: Not on file   Occupational History    Not on file   Tobacco Use    Smoking status: Current Every Day Smoker     Packs/day: 0.25     Years: 50.00     Pack years: 12.50     Types: Cigarettes    Smokeless tobacco: Never Used   Substance and Sexual Activity    Alcohol use: Yes     Comment: rarely     Drug use: No    Sexual activity: Not on file   Other Topics Concern    Not on file   Social History Narrative    Not on file       Objective:     Vitals:    11/21/18 1037   BP: 126/77   Pulse: 86   Weight: (!) 156.5 kg (345 lb)   Height: 5' 6" (1.676 m)   PainSc: 10-Worst pain ever   PainLoc: Knee       GEN:  Well developed, well nourished.  No acute distress. No pain behavior.  HEENT:  No trauma.  Mucous membranes moist.  Nares patent bilaterally.  PSYCH: Normal affect. Thought content appropriate.  CHEST:  Breathing symmetric.  No audible wheezing.  ABD:   · Soft, non-distended.    SKIN:  Warm, pink, dry.  No rash on exposed areas.    EXT:  No cyanosis, clubbing, or edema.  No color change or changes in nail or hair growth.  NEURO/MUSCULOSKELETAL:  Fully alert, oriented, and appropriate. Speech normal francesca. No cranial nerve deficits.   Gait: Normal.     Motor Strength: 5/5 motor strength throughout lower extremities.     Right knee  Inspection: No erythema, swelling, or redness  ROM: Full with pain on extension.   Palpation: No pes anserine tenderness and positive crepitus. No patellar tendon tenderness and No patellofemoral tendon tenderness.  No instability on exam.    Left knee  Inspection: No erythema, swelling, or redness  ROM: Full with pain on flexion and extension.   Palpation: Positive pes anserine " tenderness and Positive crepitus. Positive patellar tendon tenderness and Positive patellofemoral tendon tenderness.  No instability on exam.      Imaging:      MRI Left Knee 7/21/2017 (in media):  The medial meniscus is intact. The lateral meniscus is intact.     A chronic complete ACL tear is noted. The posterior cruciate ligament is intact. The medial and lateral retinacula are intact. The medial collateral ligament is intact. The lateral collateral ligament in intact. The posterolateral corner structures are intact.     There is full thickness fissuring of the central aspect medial femoral cartilage. The lateral tibiofemoral compartment cartilage is intact. There is broad thickness defect within the central trochlear cartilage. There is mild lateral patellofemoral cartilage thinning and regional full thickness loss of the central superior patellar cartilage.     A small effusion is present. There is trace infrapatellar fluid. Tiny popliteal cyst is noted. Subtle focal marrow edema is seen within the posterior tibial plateau. The bone marrow signal is heterogeneous likely reflecting marrow reconversion.     There is trace pes anserine bursitis. The tendons are otherwise normal.     Grade 2 fatty streaking of the semimembranosus and vastus lateralis muscles are noted. There is mild prepatellar edema.     MRI Right Shoulder 7/21/2017 (in media):   There is a complete tear of the supraspinatus tendon with retraction to the glenohumeral joint. There is partial tearing of the anterior infraspinatus articular surface. Mild subscapularis tendinosis is noted. The teres minor is intact. A small amount of subacromial/subdeltoid fluid is noted. The proximal long head biceps tendon is poorly visualized proximally although intact distally.     Degenerative tearing of the anterosuperior through posterosuperior labrum is noted. There is moderate to severe superior glenoid cartilage thinning with subchondral degenerative  changes. The glenohumeral ligaments appear grossly intact.     An os acromiale is seen with fluid and osteophytosis at its junction with the base of the acromion. There is moderate superior subluxation of the AC joint with mild osteophytosis.     Small effusion with subcoracoid extension is noted.     There is grade 2/3 fatty atrophy of the supraspinatus muscle, grade 2 of the infraspinatus and subscapularis. Heterogeneous signal is seen throughout the bone marrow likely reflecting marrow reconversion.       Assessment:     Encounter Diagnoses   Name Primary?    Chronic pain of left knee Yes    Primary osteoarthritis of left knee     Chronic pain disorder     Morbid obesity with BMI of 50.0-59.9, adult        Plan:     Ca was seen today for knee pain and leg pain.    Diagnoses and all orders for this visit:    Chronic pain of left knee  -     ketorolac injection 30 mg    Primary osteoarthritis of left knee    Chronic pain disorder    Morbid obesity with BMI of 50.0-59.9, adult       Her pain is consistent with the above.    We discussed the assessment and recommendations.  All available images were reviewed. We discussed the disease process, prognosis, treatment plan, and risks and benefits. The patient is aware of the risks and benefits of the medications being prescribed, common side effects, and proper usage. The following is the plan we agreed on:     1. Toradol 30 mg IM in office today.   2. Schedule for left knee steroid injection. The procedure, risks, benefits and options were discussed with patient. There are no contraindications to the procedure. The patient expressed understanding and agreed to proceed.    3. If she has significant relief with Toradol, she may cancel above procedure.   4. She is s/p left genicular cooled RFA. We can repeat this as needed.   5. In the future, we can consider right genicular nerve block.   6. Continue weight loss strategies.   7. Continue Percocet per PCP.    8. Continue compound cream.   9. RTC 2 weeks after above procedure.     - Dr. Vidal was consulted on the patient and agrees with this plan.    The above plan and management options were discussed at length with patient. Patient is in agreement with the above and verbalized understanding.     Savanna Hyatt NP  11/21/2018

## 2018-11-21 NOTE — PROGRESS NOTES
"Subjective:     Patient ID: Ca Coats is a 67 y.o. female.    Chief Complaint: Pain    Consulted by: Self      Disclaimer: This note was generated using voice recognition software.  There may be a typographical errors that were missed during proofreading.      HPI:    Ca Coats is a 67 y.o. female who presents today with left knee pain. Her pain has been going on for "too long."  She was previously treated by Dr. Iyer at [facility].  She has injections [id number] with him.  These were helpful, but she does not know if the injections were steroid or viscosupplementation.   This pain is described in detail below.    Interval History (4/30/2018):  The patient returns to clinic today for follow up and records review. We did received records from [facility] including a MRI of her knee. Dr. Iyer's last office visit note from January 2017 does not include any previous injections. She continues to report bilateral knee pain, left greater than right. She describes this pain as constant and aching. This pain is worse with prolonged walking. She also report right shoulder pain with prolonged overhead activity. She continues to take Percocet with benefit. She denies any other health changes.    Interval History (5/31/2018):  The patient returns to clinic for follow up. She is s/p left knee Synvisc One injection on 5/8/2018. She reports 35% relief of her knee pain. She continues to report bilateral knee pain, left greater than right. She describes this pain as constant and aching. Her pain is worse with prolonged walking and standing. She is scheduled to see Orthopedics at the  End of this month. She is also following up with Bariatrics to discuss the lap band procedure. She continues to take Percocet with benefit. She also uses Voltaren gel with benefit but this is expensive for her. She denies any other health changes.         Interval History (7/2/2018):  The patient returns to clinic today for " "follow up. She continues to report left knee pain that is constant, sharp, and aching in nature. Her pain is worse with prolonged walking and standing. She is scheduled for weight loss surgery in August. She continues follow up with orthopedics who does not recommend surgery at this time due to her BMI. She continues to take Percocet as needed with benefit. She denies any other health changes.     Interval History (7/19/2018):  The patient returns to clinic today for follow up. She is s/p left genicular nerve block on 7/10/2018. She reports 80% relief of her left knee pain. She reports that she was able to walk for longer distances (3 blocks) after the block. Her pain has returned to baseline. She continues to report left knee pain that is constant, sharp, and aching in nature. Her pain is worse with prolonged walking and standing. She denies any other health changes.     Interval History (8/21/2018):  The patient returns to clinic today for follow up. She is s/p left genicular cooled RFA on 7/31/2018. She reports 60% relief of her left knee pain. She reports intermittent knee pain that is sore and aching in nature. She continues to perform a home exercise routine. She is actively trying to lose weight. She continues to follow up with Bariatric Surgery at [facility]. She is considering weight loss surgery. She continues to use compound cream with benefit. She denies any other health changes.     Interval History (11/21/2018):  The patient returns to clinic today for follow up. She reports increased left knee pain this past week. She reports 2 episodes where her knee has "locked" up on her while walking. She describes her knee pain as sore and aching. She is actively trying to lose weight and quit smoking. She continues to use the compound cream with benefit. She denies any other health changes.     Physical Therapy: Yes, she did this in 2018 for one month (twice a week). She does HEP (stretching and ROM in the " morning)    Non-pharmacologic Treatment:     · Ice/Heat: Heat is more helpful than ice  · TENS: Not tried  · Massage: Not tried  · Chiropractic care: Not tried  · Acupuncture: Not tried  · Other: Uses a cane         Pain Medications:         · Currently taking: Gabapentin 300 mg/600 mg, Percocet 5/325 mg BID PRN    · Has tried in the past:  Ibuprofen (still takes sometimes), Voltaren gel    · Has not tried: Tylenol, Muscle relaxants, TCAs, SNRIs, Lyrica, compound topical creams    Blood thinners: None    Interventional Therapies: [id number] injections by Dr. Iyer were helpful  · 7/10/2018- Left genicular nerve block  · 7/31/2018- Left genicular cooled RFA    Relevant Surgeries: None    Affecting sleep? No    Affecting daily activities? Yes    Depressive symptoms? No          · SI/HI? No    Work status: No, on disability due to her knee    Prescription Monitoring Program database:  Reviewed and consistent with medication use as prescribed.    Pain Scales  Best: 3/10  Worst: 10/10  Usually: 3/10  Today: 10/10    Knee Pain    The pain is present in the left knee. This is a chronic problem. The current episode started more than 1 year ago. The problem occurs daily. The problem has been gradually worsening. The quality of the pain is described as aching, burning, sharp, tight and numb. The pain is at a severity of 8/10. Associated symptoms include an inability to bear weight. The symptoms are aggravated by walking and standing. She has tried injection treatment, exercise, cold, heat, movement and oral narcotics for the symptoms. The treatment provided mild relief. Physical therapy was effective.  Leg Pain    Associated symptoms include an inability to bear weight.       Last 3 PDI Scores 11/21/2018 7/19/2018 7/2/2018   Pain Disability Index (PDI) 28 [phone]   ]    Review of Systems   Musculoskeletal: Positive for joint pain.        Left knee pain             Past Medical History:   Diagnosis Date    Cataract     Dry  eye syndrome     GERD (gastroesophageal reflux disease)     Glaucoma suspect     Hypertension        Past Surgical History:   Procedure Laterality Date    BROXPTRK-[id number] Left 7/31/2018    Performed by [facility] at [facility]    APPENDECTOMY      BLOCK, NERVE Left 7/10/2018    [facility] at [facility]    CATARACT EXTRACTION W/  INTRAOCULAR LENS IMPLANT Bilateral     MFIOL OU    cataract surgery   2017    INJECTION OF ANESTHETIC AGENT AROUND NERVE Left 7/10/2018    Procedure: BLOCK, NERVE;  Surgeon: Samantha Vidal MD;  Location: [facility];  Service: Pain Management;  Laterality: Left;  Genicular     INJECTION-JOINT Left 5/8/2018    Performed by Sam[facility]idal MD at [facility]       Review of patient's allergies indicates:  No Known Allergies    Current Outpatient Medications   Medication Sig Dispense Refill    amLODIPine (NORVASC) 5 MG tablet       B-complex with vitamin C (Z-BEC OR EQUIV) tablet       CALCIUM CITRATE-VITAMIN D2 ORAL       gabapentin (NEURONTIN) 300 MG capsule       lisinopril (PRINIVIL,ZESTRIL) 40 MG tablet       multivitamin with minerals tablet       nicotine (NICODERM CQ) 14 mg/24 hr Place 1 patch onto the skin once daily. 28 patch 0    nicotine polacrilex (NICORETTE) 4 MG Gum Take 1 each (4 mg total) by mouth as needed. 6-9 pieces as needed daily to replace cigarette 100 each 0    omeprazole (PRILOSEC) 40 MG capsule       oxyCODONE-acetaminophen (PERCOCET) 5-325 mg per tablet       pantoprazole (PROTONIX) 40 MG tablet       sucralfate (CARAFATE) 1 gram tablet       VITAMIN B-12 5,000 mcg Subl        No current facility-administered medications for this visit.        Family History   Problem Relation Age of Onset    No Known Problems Mother     No Known Problems Father        Social History     Socioeconomic History    Marital status: Single     Spouse name: Not on file    Number of children: Not on file  "   Years of education: Not on file    Highest education level: Not on file   Social Needs    Financial resource strain: Not on file    Food insecurity - worry: Not on file    Food insecurity - inability: Not on file    Transportation needs - medical: Not on file    Transportation needs - non-medical: Not on file   Occupational History    Not on file   Tobacco Use    Smoking status: Current Every Day Smoker     Packs/day: 0.25     Years: 50.00     Pack years: 12.50     Types: Cigarettes    Smokeless tobacco: Never Used   Substance and Sexual Activity    Alcohol use: Yes     Comment: rarely     Drug use: No    Sexual activity: Not on file   Other Topics Concern    Not on file   Social History Narrative    Not on file       Objective:     Vitals:    11/21/18 1037   BP: 126/77   Pulse: 86   Weight: (!) 156.5 kg (345 lb)   Height: 5' 6" (1.676 m)   PainSc: 10-Worst pain ever   PainLoc: Knee       GEN:  Well developed, well nourished.  No acute distress. No pain behavior.  HEENT:  No trauma.  Mucous membranes moist.  Nares patent bilaterally.  PSYCH: Normal affect. Thought content appropriate.  CHEST:  Breathing symmetric.  No audible wheezing.  ABD:   · Soft, non-distended.    SKIN:  Warm, pink, dry.  No rash on exposed areas.    EXT:  No cyanosis, clubbing, or edema.  No color change or changes in nail or hair growth.  NEURO/MUSCULOSKELETAL:  Fully alert, oriented, and appropriate. Speech normal francesca. No cranial nerve deficits.   Gait: Normal.     Motor Strength: 5/5 motor strength throughout lower extremities.     Right knee  Inspection: No erythema, swelling, or redness  ROM: Full with pain on extension.   Palpation: No pes anserine tenderness and positive crepitus. No patellar tendon tenderness and No patellofemoral tendon tenderness.  No instability on exam.    Left knee  Inspection: No erythema, swelling, or redness  ROM: Full with pain on flexion and extension.   Palpation: Positive pes anserine " tenderness and Positive crepitus. Positive patellar tendon tenderness and Positive patellofemoral tendon tenderness.  No instability on exam.      Imaging:      MRI Left Knee 7/21/2017 (in media):  The medial meniscus is intact. The lateral meniscus is intact.     A chronic complete ACL tear is noted. The posterior cruciate ligament is intact. The medial and lateral retinacula are intact. The medial collateral ligament is intact. The lateral collateral ligament in intact. The posterolateral corner structures are intact.     There is full thickness fissuring of the central aspect medial femoral cartilage. The lateral tibiofemoral compartment cartilage is intact. There is broad thickness defect within the central trochlear cartilage. There is mild lateral patellofemoral cartilage thinning and regional full thickness loss of the central superior patellar cartilage.     A small effusion is present. There is trace infrapatellar fluid. Tiny popliteal cyst is noted. Subtle focal marrow edema is seen within the posterior tibial plateau. The bone marrow signal is heterogeneous likely reflecting marrow reconversion.     There is trace pes anserine bursitis. The tendons are otherwise normal.     Grade 2 fatty streaking of the semimembranosus and vastus lateralis muscles are noted. There is mild prepatellar edema.     MRI Right Shoulder 7/21/2017 (in media):   There is a complete tear of the supraspinatus tendon with retraction to the glenohumeral joint. There is partial tearing of the anterior infraspinatus articular surface. Mild subscapularis tendinosis is noted. The teres minor is intact. A small amount of subacromial/subdeltoid fluid is noted. The proximal long head biceps tendon is poorly visualized proximally although intact distally.     Degenerative tearing of the anterosuperior through posterosuperior labrum is noted. There is moderate to severe superior glenoid cartilage thinning with subchondral degenerative  changes. The glenohumeral ligaments appear grossly intact.     An os acromiale is seen with fluid and osteophytosis at its junction with the base of the acromion. There is moderate superior subluxation of the AC joint with mild osteophytosis.     Small effusion with subcoracoid extension is noted.     There is grade 2/3 fatty atrophy of the supraspinatus muscle, grade 2 of the infraspinatus and subscapularis. Heterogeneous signal is seen throughout the bone marrow likely reflecting marrow reconversion.       Assessment:     Encounter Diagnoses   Name Primary?    Chronic pain of left knee Yes    Primary osteoarthritis of left knee     Chronic pain disorder     Morbid obesity with BMI of 50.0-59.9, adult        Plan:     Ca was seen today for knee pain and leg pain.    Diagnoses and all orders for this visit:    Chronic pain of left knee  -     ketorolac injection 30 mg    Primary osteoarthritis of left knee    Chronic pain disorder    Morbid obesity with BMI of 50.0-59.9, adult       Her pain is consistent with the above.    We discussed the assessment and recommendations.  All available images were reviewed. We discussed the disease process, prognosis, treatment plan, and risks and benefits. The patient is aware of the risks and benefits of the medications being prescribed, common side effects, and proper usage. The following is the plan we agreed on:     1. Toradol 30 mg IM in office today.   2. Schedule for left knee steroid injection. The procedure, risks, benefits and options were discussed with patient. There are no contraindications to the procedure. The patient expressed understanding and agreed to proceed.    3. If she has significant relief with Toradol, she may cancel above procedure.   4. She is s/p left genicular cooled RFA. We can repeat this as needed.   5. In the future, we can consider right genicular nerve block.   6. Continue weight loss strategies.   7. Continue Percocet per PCP.    8. Continue compound cream.   9. RTC 2 weeks after above procedure.     - Dr. Vidal was consulted on the patient and agrees with this plan.    The above plan and management options were discussed at length with patient. Patient is in agreement with the above and verbalized understanding.     Savanna Hyatt NP  11/21/2018

## 2018-11-26 ENCOUNTER — TELEPHONE (OUTPATIENT)
Dept: PAIN MEDICINE | Facility: CLINIC | Age: 68
End: 2018-11-26

## 2018-11-26 NOTE — TELEPHONE ENCOUNTER
----- Message from Jennifer Ayers sent at 11/26/2018  3:10 PM CST -----  Contact: Self            Name of Who is Calling: LU RENAE [[id number]]      What is the request in detail:  Pt is requesting a call back from the clinical team. Pt declined to provide any details, requested a call back today. Please contact to further discuss and advise.        Can the clinic reply by [facility]: N      What Number to Call Back if not in [facility]: [phone]

## 2018-11-26 NOTE — TELEPHONE ENCOUNTER
Staff contacted patient to discuss her message.     Patient informed staff she only wants to speak to savanna.     Staff informed her that her request will be presented to Savanna.

## 2018-11-27 NOTE — TELEPHONE ENCOUNTER
Spoke with patient. She reports that her knee pain has worsened since her visit. She is scheduled in office procedure in January. She is asking for a sooner date. Will discuss this with Dr. Vidal. Patient verbalized understanding.

## 2018-11-28 DIAGNOSIS — G89.29 CHRONIC PAIN OF LEFT KNEE: Primary | ICD-10-CM

## 2018-11-28 DIAGNOSIS — M25.562 CHRONIC PAIN OF LEFT KNEE: Primary | ICD-10-CM

## 2018-11-28 NOTE — TELEPHONE ENCOUNTER
Please cancel patient's in office procedure for January. Please schedule for left knee steroid injection in procedure area with Dr. Vidal.

## 2018-11-30 ENCOUNTER — TELEPHONE (OUTPATIENT)
Dept: PAIN MEDICINE | Facility: CLINIC | Age: 68
End: 2018-11-30

## 2018-11-30 PROBLEM — M25.569 CHRONIC KNEE PAIN: Status: ACTIVE | Noted: 2018-11-30

## 2018-11-30 PROBLEM — G89.29 CHRONIC KNEE PAIN: Status: ACTIVE | Noted: 2018-11-30

## 2018-11-30 NOTE — TELEPHONE ENCOUNTER
----- Message from Amanda Patel sent at 11/30/2018 10:04 AM CST -----  Please contact pt below and schedule the procedure in the clinic after 12/10/18 with Dr. Vidal. See message below.  ----- Message -----  From: Arlyn Bowen RN  Sent: 11/30/2018   9:43 AM  To: [facility] Pain Management Schedulers    We had to cancel this pt's knee steroid injection because pt had EGD on 11/26. Young said this procedure can be done in the clinic. It was only scheduled in the procedure area because she had no clinic appts available. Can you please call patient as soon as possible to schedule her for the week after 12/10, that will be 2 weeks from the EGD. Thanks.

## 2018-11-30 NOTE — TELEPHONE ENCOUNTER
At this time you do not have any more in office procedure appointments available which provider would you like the patient to have an in office left knee steroid injection with during your leave of absence?

## 2018-12-03 ENCOUNTER — TELEPHONE (OUTPATIENT)
Dept: PAIN MEDICINE | Facility: CLINIC | Age: 68
End: 2018-12-03

## 2018-12-03 DIAGNOSIS — M25.562 CHRONIC PAIN OF LEFT KNEE: Primary | ICD-10-CM

## 2018-12-03 DIAGNOSIS — G89.29 CHRONIC PAIN OF LEFT KNEE: Primary | ICD-10-CM

## 2018-12-03 NOTE — TELEPHONE ENCOUNTER
----- Message from Edith Weiss sent at 12/3/2018  1:49 PM CST -----  Contact: LU RENAE [[id number]]  Name of Who is Calling: LU RENAE [[id number]]        What is the request in detail: Pt returned call to staff to schedule injection. Please contact pt to schedule next available date at your earliest convenience. Thanks-          Can the clinic reply by [facility]: N    What [facility]: [phone]

## 2018-12-03 NOTE — TELEPHONE ENCOUNTER
----- Message from Joreg Jackson sent at 12/3/2018  8:55 AM CST -----  Contact: LU RENAE [[id number]]            Name of Who is Calling: LU RENAE [[id number]]      What is the request in detail: Patient would like to speak with staff in regards to rescheduling an injection. Please advise      Can the clinic reply by [facility]: no      What [facility] Back [facility]: [phone]

## 2018-12-04 ENCOUNTER — CLINICAL SUPPORT (OUTPATIENT)
Dept: SMOKING CESSATION | Facility: CLINIC | Age: 68
End: 2018-12-04
Payer: COMMERCIAL

## 2018-12-04 ENCOUNTER — TELEPHONE (OUTPATIENT)
Dept: ADMINISTRATIVE | Facility: OTHER | Age: 68
End: 2018-12-04

## 2018-12-04 DIAGNOSIS — F17.200 NICOTINE DEPENDENCE: Primary | ICD-10-CM

## 2018-12-04 PROCEDURE — 99407 BEHAV CHNG SMOKING > 10 MIN: CPT | Mod: S$GLB,,,

## 2018-12-04 NOTE — TELEPHONE ENCOUNTER
----- Message from Jorge Jackson sent at 12/4/2018 11:19 AM CST -----  Contact: LU RENAE [[id number]]    Name of Who is Calling: LU RENAE [[id number]]      What is the request in detail: Patient would like to speak with staff in regards to knowing if she can be seen sooner for her knee injection. Please advise      Can the clinic reply by [facility]: no      What [facility]: [phone]

## 2018-12-05 ENCOUNTER — TELEPHONE (OUTPATIENT)
Dept: SMOKING CESSATION | Facility: CLINIC | Age: 68
End: 2018-12-05

## 2018-12-05 ENCOUNTER — CLINICAL SUPPORT (OUTPATIENT)
Dept: SMOKING CESSATION | Facility: CLINIC | Age: 68
End: 2018-12-05
Payer: COMMERCIAL

## 2018-12-05 DIAGNOSIS — F17.210 LIGHT CIGARETTE SMOKER (1-9 CIGS/DAY): ICD-10-CM

## 2018-12-05 DIAGNOSIS — F17.210 LIGHT CIGARETTE SMOKER (1-9 CIGARETTES PER DAY): Primary | ICD-10-CM

## 2018-12-05 PROCEDURE — 99999 PR PBB SHADOW E&M-EST. PATIENT-LVL I: CPT | Mod: PBBFAC,,,

## 2018-12-05 RX ORDER — IBUPROFEN 200 MG
1 TABLET ORAL DAILY
Qty: 28 PATCH | Refills: 0 | Status: SHIPPED | OUTPATIENT
Start: 2018-12-05 | End: 2019-01-03 | Stop reason: SDUPTHER

## 2018-12-05 NOTE — PROGRESS NOTES
Individual Follow-Up Form    12/5/2018    Quit Date:     Clinical Status of Patient: Outpatient    Length of Service: 30 minutes    Continuing Medication: no    Other Medications: none     Target Symptoms: Withdrawal and medication side effects. The following were  rated moderate (3) to severe (4) on TCRS:  · Moderate (3): none  · Severe (4): none    Comments: Patient is smoking 2-3 cpd.  She is no longer using nicotine patches do to running out but would like to resume. Patient denies any negative side affects while using 14 mg nicotine patch.  Request for refill sent.  Patient has transportation issues which prevents her from follow up.  She is very committed to quitting in preparation of gastric bypass surgery.  We identified current triggers and cues.  Patient was instructed on how to change habitual behavior.  She will attempt complete tobacco cessation this week.  We discussed coffee intake and how it affects tobacco use.  The patient denies any abnormal behavioral or mental changes at this time.   The patient will continue with group therapy sessions and medication monitoring by CTTS. Prescribed medication management will be by physician.       Diagnosis: F17.210    Next Visit: 1 week

## 2018-12-05 NOTE — TELEPHONE ENCOUNTER
Returned phone call regarding need for nicotine patches received from main office.  Patient was unavailable.  I left a detailed message with nature of call and contact information.

## 2018-12-10 ENCOUNTER — HOSPITAL ENCOUNTER (EMERGENCY)
Facility: OTHER | Age: 68
Discharge: HOME | End: 2018-12-10
Attending: EMERGENCY MEDICINE
Payer: MEDICARE

## 2018-12-10 VITALS
DIASTOLIC BLOOD PRESSURE: 79 MMHG | SYSTOLIC BLOOD PRESSURE: 165 MMHG | RESPIRATION RATE: 17 BRPM | HEIGHT: 66 IN | HEART RATE: 81 BPM | OXYGEN SATURATION: 97 % | BODY MASS INDEX: 47.09 KG/M2 | TEMPERATURE: 99 F | WEIGHT: 293 LBS

## 2018-12-10 DIAGNOSIS — M25.561 CHRONIC PAIN OF BOTH KNEES: Primary | ICD-10-CM

## 2018-12-10 DIAGNOSIS — G89.29 CHRONIC PAIN OF BOTH KNEES: Primary | ICD-10-CM

## 2018-12-10 DIAGNOSIS — M25.562 CHRONIC PAIN OF BOTH KNEES: Primary | ICD-10-CM

## 2018-12-10 DIAGNOSIS — M25.569 KNEE PAIN, ACUTE: ICD-10-CM

## 2018-12-10 PROCEDURE — 99285 EMERGENCY DEPT VISIT HI MDM: CPT | Mod: 25

## 2018-12-10 PROCEDURE — 63600175 PHARM REV CODE 636 W HCPCS: Performed by: EMERGENCY MEDICINE

## 2018-12-10 PROCEDURE — 96372 THER/PROPH/DIAG INJ SC/IM: CPT

## 2018-12-10 RX ORDER — OXYCODONE AND ACETAMINOPHEN 5; 325 MG/1; MG/1
1 TABLET ORAL EVERY 6 HOURS PRN
Qty: 20 TABLET | Refills: 0 | Status: SHIPPED | OUTPATIENT
Start: 2018-12-10 | End: 2019-08-08

## 2018-12-10 RX ORDER — KETOROLAC TROMETHAMINE 30 MG/ML
15 INJECTION, SOLUTION INTRAMUSCULAR; INTRAVENOUS
Status: COMPLETED | OUTPATIENT
Start: 2018-12-10 | End: 2018-12-10

## 2018-12-10 RX ADMIN — KETOROLAC TROMETHAMINE 15 MG: 30 INJECTION, SOLUTION INTRAMUSCULAR at 04:12

## 2018-12-10 NOTE — ED NOTES
Pt presents to ED via EMS with complaints of bilateral knee pain. PMH of chronic knee pain, follows up with pain management. Pt reports mechanical fall today, struck bilateral knees on the ground. Pt denies LOC, head trauma. Pt reporting 10/10 bilateral knee pain after fall. Pt denies SOB, CP, numbness/tingling. AAOx4, RR even and unlabored. Will continue to monitor.

## 2018-12-10 NOTE — ED PROVIDER NOTES
Encounter Date: 12/10/2018    SCRIBE #1 NOTE: I, Letty Martin, am scribing for, and in the presence of, Dr. Vasquez.       History     Chief Complaint   Patient presents with    Knee Pain     Pt reports she fell today and is c/o pain to both knees. Pt usually gets injections in both knees but missed the last appt.      Time seen by provider: 4:11 PM    This is a 67 y.o. female, with a history of chronic left knee pain, and right shoulder pain, who presents s/p fall today The patient reports she fell today due to her left knee giving out on her, but she denies injuring anything from the fall. The patient reports chronic worsening left knee pain with concern for frequent falls. The patient also reports there is a pain shooting from her right knee to her upper leg that she noticed after fall today. The patient reports she has been taking one percocet in the morning, and one percocet at night for the pain, but it is no longer working. The patient reports she had a procedure done on her knee five or six months ago by pain management with temporary improvement, and she was scheduled for another one last week but unable to do it due to GI issues. The patient reports chronic knee pain is due to a twisting injury to her knee over thirteen years ago but never treated surgically. She reports she has gone to and finished physical therapy on her knee with no improvement.  Her chronic right shoulder pain is unchanged and limits her ability to raise her right hand above her head, unchanged now. The patient reports she normally ambulates with a cane. The patient reports rcently having an endoscopy done, and that she is no longer able to take Ibuprofen for pain due to stomach ulcers found.      The history is provided by the patient.     Review of patient's allergies indicates:  No Known Allergies  Past Medical History:   Diagnosis Date    Cataract     Dry eye syndrome     GERD (gastroesophageal reflux disease)      Glaucoma suspect     Hypertension      Past Surgical History:   Procedure Laterality Date    [id number] Left 7/31/2018    Performed by [facility]idal [facility] at [facility]    APPENDECTOMY      BLOCK, NERVE Left 7/10/2018    [facility] at [facility]    CATARACT EXTRACTION W/  INTRAOCULAR LENS IMPLANT Bilateral     MFIOL OU    cataract surgery   2017    INJECTION OF ANESTHETIC AGENT AROUND NERVE Left 7/10/2018    Procedure: BLOCK, NERVE;  Surgeon: Samantha Vidal MD;  Location: [facility];  Service: Pain Management;  Laterality: Left;  Genicular     INJECTION-JOINT Left 5/8/2018    Performed by [facility]idal MD at [facility]     Family History   Problem Relation Age of Onset    No Known Problems Mother     No Known Problems Father      Social History     Tobacco Use    Smoking status: Current Some Day Smoker     Packs/day: 0.25     Years: 50.00     Pack years: 12.50     Types: Cigarettes    Smokeless tobacco: Never Used   Substance Use Topics    Alcohol use: Yes     Comment: rarely     Drug use: No     Review of Systems   Constitutional: Negative for fever.   HENT: Negative for sore throat.    Respiratory: Negative for shortness of breath.    Cardiovascular: Negative for chest pain.   Gastrointestinal: Negative for nausea.   Genitourinary: Negative for dysuria.   Musculoskeletal: Negative for back pain.        Positive for knee pain.   Skin: Negative for rash and wound.   Neurological: Negative for weakness.   Hematological: Does not bruise/bleed easily.       Physical Exam     Initial Vitals [12/10/18 1458]   BP Pulse Resp Temp SpO2   (!) 144/84 86 18 99.1 °F (37.3 °C) 96 %      MAP       --         Physical Exam    Nursing note and vitals reviewed.  Constitutional: She appears well-developed and well-nourished. She is not diaphoretic. No distress.   HENT:   Head: Normocephalic and atraumatic.   Mouth/Throat: No oropharyngeal exudate.   Eyes:  Conjunctivae and EOM are normal. Pupils are equal, round, and reactive to light. No scleral icterus.   Neck: Normal range of motion. Neck supple.   Cardiovascular: Normal rate, regular rhythm and normal heart sounds.   No murmur heard.  Pulmonary/Chest: Breath sounds normal. She has no wheezes. She has no rhonchi. She has no rales.   Abdominal: Soft. There is no tenderness.   Musculoskeletal:   Bilateral suprapatellar effusions, left greater than right.  Mild pain to right hip with right leg extension with no focal tenderness. No ecchymosis.   Neurological: She is alert and oriented to person, place, and time.   Skin: Skin is warm and dry. No rash noted. No erythema. No pallor.   Psychiatric: Thought content normal.         ED Course   Procedures  Labs Reviewed - No data to display       Imaging Results          X-Ray Knee 3 View Right (Final result)  Result time 12/10/18 17:16:08    Final result by Del Angulo MD (12/10/18 17:16:08)                 Impression:      No acute displaced fracture-dislocation identified.      Electronically signed by: Del Angulo MD  Date:    12/10/2018  Time:    17:16             Narrative:    EXAMINATION:  XR KNEE 3 VIEW RIGHT    CLINICAL HISTORY:  Pain in unspecified knee    TECHNIQUE:  AP, lateral, and Merchant views of the right knee were performed.    COMPARISON:  None    FINDINGS:  Overall alignment is within normal limits.  No displaced fracture, dislocation or destructive osseous process.  No large suprapatellar joint effusion.  Mild tricompartmental degenerative change.  No subcutaneous emphysema or radiodense retained foreign body.                               X-Ray Hip 2 View Right (Final result)  Result time 12/10/18 17:17:04    Final result by Del Angulo MD (12/10/18 17:17:04)                 Impression:      No acute displaced fracture-dislocation identified.      Electronically signed by: Del Angulo MD  Date:    12/10/2018  Time:    17:17              Narrative:    EXAMINATION:  XR HIP 2 VIEW RIGHT    CLINICAL HISTORY:  Hip pain s/p fall;    TECHNIQUE:  AP view of the pelvis and frog leg lateral view of the right hip were performed.    COMPARISON:  None    FINDINGS:  Overall alignment is within normal limits.  No displaced fracture, dislocation or destructive osseous process.  Partial sacral ization at right L5-S1.  Age-related mild degenerative changes of the included lower lumbosacral spine, pubic symphysis and bilateral sacroiliac and hip joints.  No subcutaneous emphysema or radiodense retained foreign body.  Several small nonspecific soft tissue calcifications at the right hip, probably representing injection granulomas.                               X-Ray Knee 3 View Left (Final result)  Result time 12/10/18 16:19:07    Final result by Hilary Timmons MD (12/10/18 16:19:07)                 Impression:      No radiographic evidence for acute or posttraumatic findings.      Electronically signed by: Hilary Timmons MD  Date:    12/10/2018  Time:    16:19             Narrative:    EXAMINATION:  XR KNEE 3 VIEW LEFT    CLINICAL HISTORY:  Injury, unspecified, initial encounter    TECHNIQUE:  AP, lateral, and Merchant views of the left knee were performed.    COMPARISON:  05/25/2018    FINDINGS:  There is diffuse osteopenia.  No fracture, dislocation or joint space effusion.  The soft tissues are within normal limits.                               X-Ray Shoulder Trauma Right (Final result)  Result time 12/10/18 15:48:02    Final result by Hang Owens MD (12/10/18 15:48:02)                 Impression:      Prominent degenerative changes of the right acromioclavicular joint.    No evidence for acute fracture, bone destruction, or dislocation.      Electronically signed by: Hang Owens MD  Date:    12/10/2018  Time:    15:48             Narrative:    EXAMINATION:  XR SHOULDER TRAUMA 3 VIEW RIGHT    CLINICAL HISTORY:  Injury, unspecified, initial  encounter    TECHNIQUE:  Three or four views of the right shoulder were performed.    COMPARISON:  None    FINDINGS:  The bones appear intact.  There is no evidence for acute fracture or bone destruction.  There are prominent degenerative changes involving the right acromioclavicular joint.  There is no evidence for dislocation.  Soft tissues are unremarkable.                              X-Rays:   Independently Interpreted Readings:   Other Readings:  Left Knee - No acute fractures.  Right Shoulder - No acute fractures.    Medical Decision Making:   Initial Assessment:       67-year-old female with history of chronic left knee and right shoulder pain presents after fall.  Her left knee gives out on her often, and did today resulting in fall with no significant injury. She noticed right knee and thigh/hip pain on arriving to ED.  She is concerned because she was unable to get left knee procedure done by pain management Dr. Vidal last month because she had recent endoscopy that showed stomach ulcers, for which she was treated.  She states she has had chronic worsening of left knee pain and swelling, and 2 Percocets per day is no longer effective.  She is unable to take NSAIDs due to ulcer, and is no longer in physical therapy.  She may have gastric lap band placed for weight loss next year.   Per chart review, MRI of left knee shows chronic ACL tear, which is likely cause of chronic knee pain and instability.  She also has right shoulder rotator cuff tear that is cause of her chronic pain there.     X-rays done of left knee, right shoulder with no acute findings.  Given new complaint of right knee pain extending to her hip after fall today, x-rays done with no acute fractures.  She likely has right knee arthritis and small suprapatellar effusion with no sign of septic arthritis or acute injury. Patient is concerned since she will be unable to see her pain management doctor for left knee procedure for another 11  days.  No indication for emergent arthrocentesis at this time.  Will give patient another Percocet Rx if she starts taking it t.i.d. instead of b.i.d., and she is urged to restart physical therapy and pursue procedure for weight loss, and to discuss with Ortho surgical options for left knee ACL repair.  She was given walker since she is a fall risk with left knee instability and obesity.  She feels comfortable with discharge plan will return to the ED for any concerns      Clinical Tests:   Radiological Study: Ordered and Reviewed            Scribe Attestation:   Scribe #1: I performed the above scribed service and the documentation accurately describes the services I performed. I attest to the accuracy of the note.    Attending Attestation:           Physician Attestation for Scribe:  Physician Attestation Statement for Scribe #1: I, Dr. Vasquez, reviewed documentation, as scribed by Letty Martin in my presence, and it is both accurate and complete.                    Clinical Impression:     1. Chronic pain of both knees    2. Knee pain, acute                                   Rambo SRAVANTHI Vasquez MD  12/11/18 0000

## 2018-12-21 ENCOUNTER — HOSPITAL ENCOUNTER (OUTPATIENT)
Facility: OTHER | Age: 68
Discharge: HOME | End: 2018-12-21
Attending: ANESTHESIOLOGY | Admitting: ANESTHESIOLOGY
Payer: MEDICARE

## 2018-12-21 VITALS
BODY MASS INDEX: 47.09 KG/M2 | DIASTOLIC BLOOD PRESSURE: 66 MMHG | RESPIRATION RATE: 18 BRPM | TEMPERATURE: 99 F | WEIGHT: 293 LBS | OXYGEN SATURATION: 95 % | SYSTOLIC BLOOD PRESSURE: 137 MMHG | HEIGHT: 66 IN | HEART RATE: 86 BPM

## 2018-12-21 DIAGNOSIS — M17.12 LEFT KNEE DJD: ICD-10-CM

## 2018-12-21 DIAGNOSIS — G89.29 CHRONIC PAIN OF LEFT KNEE: ICD-10-CM

## 2018-12-21 DIAGNOSIS — M25.562 CHRONIC PAIN OF LEFT KNEE: ICD-10-CM

## 2018-12-21 DIAGNOSIS — M17.9 OSTEOARTHRITIS OF KNEE, UNSPECIFIED LATERALITY, UNSPECIFIED OSTEOARTHRITIS TYPE: Primary | ICD-10-CM

## 2018-12-21 PROCEDURE — 77002 NEEDLE LOCALIZATION BY XRAY: CPT | Mod: 26,,, | Performed by: ANESTHESIOLOGY

## 2018-12-21 PROCEDURE — 20610 DRAIN/INJ JOINT/BURSA W/O US: CPT | Mod: LT,,, | Performed by: ANESTHESIOLOGY

## 2018-12-21 PROCEDURE — 20610 DRAIN/INJ JOINT/BURSA W/O US: CPT | Performed by: ANESTHESIOLOGY

## 2018-12-21 PROCEDURE — 25500020 PHARM REV CODE 255: Performed by: ANESTHESIOLOGY

## 2018-12-21 PROCEDURE — S0020 INJECTION, BUPIVICAINE HYDRO: HCPCS | Performed by: ANESTHESIOLOGY

## 2018-12-21 PROCEDURE — 63600175 PHARM REV CODE 636 W HCPCS: Performed by: ANESTHESIOLOGY

## 2018-12-21 PROCEDURE — 25000003 PHARM REV CODE 250: Performed by: ANESTHESIOLOGY

## 2018-12-21 PROCEDURE — 77002 NEEDLE LOCALIZATION BY XRAY: CPT | Performed by: ANESTHESIOLOGY

## 2018-12-21 RX ORDER — BUPIVACAINE HYDROCHLORIDE 5 MG/ML
INJECTION, SOLUTION EPIDURAL; INTRACAUDAL
Status: DISCONTINUED | OUTPATIENT
Start: 2018-12-21 | End: 2018-12-21 | Stop reason: HOSPADM

## 2018-12-21 RX ORDER — LIDOCAINE HYDROCHLORIDE 10 MG/ML
INJECTION INFILTRATION; PERINEURAL
Status: DISCONTINUED | OUTPATIENT
Start: 2018-12-21 | End: 2018-12-21 | Stop reason: HOSPADM

## 2018-12-21 RX ORDER — METHYLPREDNISOLONE ACETATE 40 MG/ML
INJECTION, SUSPENSION INTRA-ARTICULAR; INTRALESIONAL; INTRAMUSCULAR; SOFT TISSUE
Status: DISCONTINUED | OUTPATIENT
Start: 2018-12-21 | End: 2018-12-21 | Stop reason: HOSPADM

## 2018-12-21 NOTE — DISCHARGE INSTRUCTIONS
Adult Procedural Sedation Instructions    Recovery After Procedural Sedation (Adult)  You have been given medicine by vein to make you sleep during your surgery. This may have included both a pain medicine and sleeping medicine. Most of the effects have worn off. But you may still have some drowsiness for the next 6 to 8 hours.  Home care  Follow these guidelines when you get home:  · For the next 8 hours, you should be watched by a responsible adult. This person should make sure your condition is not getting worse.  · Don't drink any alcohol for the next 24 hours.  · Don't drive, operate dangerous machinery, or make important business or personal decisions during the next 24 hours.  Note: Your healthcare provider may tell you not to take any medicine by mouth for pain or sleep in the next 4 hours. These medicines may react with the medicines you were given in the hospital. This could cause a much stronger response than usual.  Follow-up care  Follow up with your healthcare provider if you are not alert and back to your usual level of activity within 12 hours.  When to seek medical advice  Call your healthcare provider right away if any of these occur:  · Drowsiness gets worse  · Weakness or dizziness gets worse  · Repeated vomiting  · You can't be awakened   Date Last Reviewed: 10/18/2016  © 9780-0600 The "[employer]. [address] [place] [address], [place] [address]. All rights reserved. This information is not intended as a substitute for professional medical care. Always follow your healthcare professional's instructions.       Thank you for allowing us to care for you today. You may receive a survey about the care we provided. Your feedback is valuable and helps us provide excellent care throughout the community.     Home Care Instructions for Pain Management:    1. DIET:   You may resume your normal diet today.   2. BATHING:   You may shower with luke warm water. No tub baths or anything that will soak  injection sites under water for the next 24 hours.  3. DRESSING:   You may remove your bandage today.   4. ACTIVITY LEVEL:   You may resume your normal activities 24 hrs after your procedure. Nothing strenuous today.  5. MEDICATIONS:   You may resume your normal medications today. To restart blood thinners, ask your doctor.  6. DRIVING    If you have received any sedatives by mouth today, you may not drive for 12 hours.    If you have received any sedation through your IV, you may not drive for 24 hrs.   7. SPECIAL INSTRUCTIONS:   No heat to the injection site for 24 hrs including, hot bath or shower, heating pad, moist heat, or hot tubs.    Use ice pack to injection site for any pain or discomfort.  Apply ice packs for 20 minute intervals as needed.    IF you have diabetes, be sure to monitor your blood sugar more closely. IF your injection contained steroids your blood sugar levels may become higher than normal.    If you are still having pain upon discharge:  Your pain may improve over the next 48 hours. The anesthetic (numbing medication) works immediately to 48 hours. IF your injection contained a steroid (anti-inflammatory medication), it takes approximately 3 days to start feeling relief and 7-10 days to see your greatest results from the medication. It is possible you may need subsequent injections. This would be discussed at your follow up appointment with pain management or your referring doctor.      PLEASE CALL YOUR DOCTOR IF:  1. Redness or swelling around the injection site.  2. Fever of 101 degrees or more  3. Drainage (pus) from the injection site.  4. For any continuous bleeding (some dried blood over the incision is normal.)    FOR EMERGENCIES:   If any unusual problems or difficulties occur during clinic hours, call [phone] or [phone].

## 2018-12-21 NOTE — INTERVAL H&P NOTE
The patient has been examined and the H&P has been reviewed:    I concur with the findings and no changes have occurred since H&P was written.    Anesthesia/Surgery risks, benefits and alternative options discussed and understood by patient/family.      I have seen the patient with the resident physician.  We have come up with the above plan.  The patient is in agreement with our plan.     There are no hospital problems to display for this patient.

## 2018-12-21 NOTE — OP NOTE
"Date of Procedure: 12/21/2018    Procedure: Left knee intra-articular injection using fluoroscopic guidance    Referring Provider: None    Pre-op diagnosis: Chronic pain of left knee [M25.562, G89.29]    Post-op diagnosis: Chronic pain of left knee [M25.562, G89.29]     Physician: Dr. Samantha Vidal     Assistant: Dr. Chambers    Anesthestia: local    EBL: None    Specimens: None    All medications, allergies, and relevant histories were reviewed. No recent antibiotics or infections.  A time-out was taken to verify the correct patient, procedure, laterality, and appropriate medications/allergies.      Fluoroscopically-guided, Contrast controlled left knee intra-articular injection:     Consent for the procedure was obtained after the procedure was fully explained to the patient.  Patient was placed in the prone position. Area was prepped with chlorhexidine. Sterile precautions observed throughout the procedure. Xylocaine 1% was infiltrated locally over the entry site over the knee joint on the left side. A 22-gauge 3.5" spinal needle was introduced in the hip joint space at the level of the greater trochanter under fluoroscopic guidance.  After negative aspiration, 0.5cc of Omnipaque was injected which showed excellent spread along the joint space.  A mixture of 3 cc of 0.5% bupivacaine with 40mg Depo-Medrol was injected after negative aspiration.   Needle was removed and a dressing was applied.     The patient tolerated the procedure well and was discharged in excellent condition.    Future Management:   If helpful, can repeat as needed.    Follow up with my clinic in 3 weeks or sooner if needed    I certify that I provided the above services.  I was present for the entire procedure, which was performed by myself with the assistance of the resident physician.  There were no parts of the procedure that were performed not by myself or without my direct supervision.            "

## 2018-12-27 ENCOUNTER — TELEPHONE (OUTPATIENT)
Dept: PAIN MEDICINE | Facility: CLINIC | Age: 68
End: 2018-12-27

## 2018-12-27 NOTE — TELEPHONE ENCOUNTER
----- Message from Helga Adams sent at 12/27/2018  1:44 PM CST -----  Contact: pt             Name of Who is Calling: LU RENAE [[id number]]      What is the request in detail: pt calling in regards to not being able to walk on knee.. Pt states she has a jamel sensation .. Please advise    Can the clinic reply by [facility]: no      What Number to Call Back if not in [facility]: [phone]

## 2018-12-28 ENCOUNTER — TELEPHONE (OUTPATIENT)
Dept: PAIN MEDICINE | Facility: CLINIC | Age: 68
End: 2018-12-28

## 2018-12-28 NOTE — TELEPHONE ENCOUNTER
----- Message from Rocio Sheikh LPN sent at 12/27/2018  2:47 PM CST -----  Contact: pt   Patient states that knee pain was better until yesterday, denies signs of infection, refused appointment to come in for evaluation, stated she may got to ER  ----- Message -----  From: Helga Adams  Sent: 12/27/2018   1:44 PM  To: [facility] Staff               Name of Who is Calling: LU RENAE [[id number]]      What is the request in detail: pt calling in regards to not being able to walk on knee.. Pt states she has a jamel sensation .. Please advise    Can the clinic reply by [facility]: no      [facility]: [phone]

## 2019-01-03 ENCOUNTER — CLINICAL SUPPORT (OUTPATIENT)
Dept: SMOKING CESSATION | Facility: CLINIC | Age: 69
End: 2019-01-03
Payer: COMMERCIAL

## 2019-01-03 DIAGNOSIS — F17.210 LIGHT CIGARETTE SMOKER (1-9 CIGS/DAY): Primary | ICD-10-CM

## 2019-01-03 PROCEDURE — 99407 PR TOBACCO USE CESSATION INTENSIVE >10 MINUTES: ICD-10-PCS | Mod: S$GLB,,,

## 2019-01-03 PROCEDURE — 99999 PR PBB SHADOW E&M-EST. PATIENT-LVL I: CPT | Mod: PBBFAC,,,

## 2019-01-03 PROCEDURE — 99407 BEHAV CHNG SMOKING > 10 MIN: CPT | Mod: S$GLB,,,

## 2019-01-03 PROCEDURE — 99999 PR PBB SHADOW E&M-EST. PATIENT-LVL I: ICD-10-PCS | Mod: PBBFAC,,,

## 2019-01-03 RX ORDER — IBUPROFEN 200 MG
1 TABLET ORAL DAILY
Qty: 28 PATCH | Refills: 0 | Status: SHIPPED | OUTPATIENT
Start: 2019-01-03 | End: 2019-04-12

## 2019-01-04 ENCOUNTER — TELEPHONE (OUTPATIENT)
Dept: PAIN MEDICINE | Facility: CLINIC | Age: 69
End: 2019-01-04

## 2019-01-09 ENCOUNTER — OFFICE VISIT (OUTPATIENT)
Dept: PAIN MEDICINE | Facility: CLINIC | Age: 69
End: 2019-01-09
Payer: MEDICARE

## 2019-01-09 ENCOUNTER — CLINICAL SUPPORT (OUTPATIENT)
Dept: SMOKING CESSATION | Facility: CLINIC | Age: 69
End: 2019-01-09
Payer: COMMERCIAL

## 2019-01-09 VITALS
DIASTOLIC BLOOD PRESSURE: 74 MMHG | HEART RATE: 85 BPM | HEIGHT: 66 IN | SYSTOLIC BLOOD PRESSURE: 141 MMHG | WEIGHT: 293 LBS | TEMPERATURE: 99 F | BODY MASS INDEX: 47.09 KG/M2

## 2019-01-09 DIAGNOSIS — M25.562 CHRONIC PAIN OF LEFT KNEE: Primary | ICD-10-CM

## 2019-01-09 DIAGNOSIS — G89.4 CHRONIC PAIN DISORDER: ICD-10-CM

## 2019-01-09 DIAGNOSIS — E66.01 MORBID OBESITY WITH BMI OF 50.0-59.9, ADULT: ICD-10-CM

## 2019-01-09 DIAGNOSIS — G89.29 CHRONIC PAIN OF LEFT KNEE: Primary | ICD-10-CM

## 2019-01-09 DIAGNOSIS — S83.512D RUPTURE OF ANTERIOR CRUCIATE LIGAMENT OF LEFT KNEE, SUBSEQUENT ENCOUNTER: ICD-10-CM

## 2019-01-09 DIAGNOSIS — M17.12 PRIMARY OSTEOARTHRITIS OF LEFT KNEE: ICD-10-CM

## 2019-01-09 DIAGNOSIS — F17.210 LIGHT SMOKER: Primary | ICD-10-CM

## 2019-01-09 PROCEDURE — 3078F PR MOST RECENT DIASTOLIC BLOOD PRESSURE < 80 MM HG: ICD-10-PCS | Mod: CPTII,S$GLB,, | Performed by: NURSE PRACTITIONER

## 2019-01-09 PROCEDURE — 3288F PR FALLS RISK ASSESSMENT DOCUMENTED: ICD-10-PCS | Mod: CPTII,S$GLB,, | Performed by: NURSE PRACTITIONER

## 2019-01-09 PROCEDURE — 3078F DIAST BP <80 MM HG: CPT | Mod: CPTII,S$GLB,, | Performed by: NURSE PRACTITIONER

## 2019-01-09 PROCEDURE — 99213 OFFICE O/P EST LOW 20 MIN: CPT | Mod: S$GLB,,, | Performed by: NURSE PRACTITIONER

## 2019-01-09 PROCEDURE — 3077F SYST BP >= 140 MM HG: CPT | Mod: CPTII,S$GLB,, | Performed by: NURSE PRACTITIONER

## 2019-01-09 PROCEDURE — 99999 PR PBB SHADOW E&M-EST. PATIENT-LVL I: ICD-10-PCS | Mod: PBBFAC,,,

## 2019-01-09 PROCEDURE — 1100F PR PT FALLS ASSESS DOC 2+ FALLS/FALL W/INJURY/YR: ICD-10-PCS | Mod: CPTII,S$GLB,, | Performed by: NURSE PRACTITIONER

## 2019-01-09 PROCEDURE — 99401 PR PREVENT COUNSEL,INDIV,15 MIN: ICD-10-PCS | Mod: S$GLB,,,

## 2019-01-09 PROCEDURE — 99213 PR OFFICE/OUTPT VISIT, EST, LEVL III, 20-29 MIN: ICD-10-PCS | Mod: S$GLB,,, | Performed by: NURSE PRACTITIONER

## 2019-01-09 PROCEDURE — 99999 PR PBB SHADOW E&M-EST. PATIENT-LVL IV: CPT | Mod: PBBFAC,,, | Performed by: NURSE PRACTITIONER

## 2019-01-09 PROCEDURE — 99401 PREV MED CNSL INDIV APPRX 15: CPT | Mod: S$GLB,,,

## 2019-01-09 PROCEDURE — 3077F PR MOST RECENT SYSTOLIC BLOOD PRESSURE >= 140 MM HG: ICD-10-PCS | Mod: CPTII,S$GLB,, | Performed by: NURSE PRACTITIONER

## 2019-01-09 PROCEDURE — 99999 PR PBB SHADOW E&M-EST. PATIENT-LVL I: CPT | Mod: PBBFAC,,,

## 2019-01-09 PROCEDURE — 3288F FALL RISK ASSESSMENT DOCD: CPT | Mod: CPTII,S$GLB,, | Performed by: NURSE PRACTITIONER

## 2019-01-09 PROCEDURE — 99999 PR PBB SHADOW E&M-EST. PATIENT-LVL IV: ICD-10-PCS | Mod: PBBFAC,,, | Performed by: NURSE PRACTITIONER

## 2019-01-09 PROCEDURE — 1100F PTFALLS ASSESS-DOCD GE2>/YR: CPT | Mod: CPTII,S$GLB,, | Performed by: NURSE PRACTITIONER

## 2019-01-09 NOTE — Clinical Note
Patient reports smoking 1 cpd this week.  Patient was encouraged to attempt complete tobacco cessation.  We reviewed how to attempt a successful quit.  Patient was reminded of reasons and benefits of quitting.  She reports lighting one cigarette in the morning while drinking coffee only taking a few puffs and letting the rest burn out.  We reviewed how to break habitual behavior.  Patient wants to be tobacco free by surgery next month. Patient remains on prescribed tobacco cessation medication regimen of 14 mg patch without any negative side effects at this time.The patient denies any abnormal behavioral or mental changes at this time. The patient will continue with individual therapy sessions and medication monitoring by CTTS. Prescribed medication management will be by physician.

## 2019-01-09 NOTE — PROGRESS NOTES
"Subjective:     Patient ID: Ca Coats is a 68 y.o. female.    Chief Complaint: Pain    Consulted by: Self      Disclaimer: This note was generated using voice recognition software.  There may be a typographical errors that were missed during proofreading.      HPI:    Ca Coats is a 68 y.o. female who presents today with left knee pain. Her pain has been going on for "too long."  She was previously treated by Dr. Iyer at [facility].  She has injections [id number] with him.  These were helpful, but she does not know if the injections were steroid or viscosupplementation.   This pain is described in detail below.    Interval History (4/30/2018):  The patient returns to clinic today for follow up and records review. We did received records from [facility] including a MRI of her knee. Dr. Iyer's last office visit note from January 2017 does not include any previous injections. She continues to report bilateral knee pain, left greater than right. She describes this pain as constant and aching. This pain is worse with prolonged walking. She also report right shoulder pain with prolonged overhead activity. She continues to take Percocet with benefit. She denies any other health changes.    Interval History (5/31/2018):  The patient returns to clinic for follow up. She is s/p left knee Synvisc One injection on 5/8/2018. She reports 35% relief of her knee pain. She continues to report bilateral knee pain, left greater than right. She describes this pain as constant and aching. Her pain is worse with prolonged walking and standing. She is scheduled to see Orthopedics at the  End of this month. She is also following up with Bariatrics to discuss the lap band procedure. She continues to take Percocet with benefit. She also uses Voltaren gel with benefit but this is expensive for her. She denies any other health changes.         Interval History (7/2/2018):  The patient returns to clinic today for " "follow up. She continues to report left knee pain that is constant, sharp, and aching in nature. Her pain is worse with prolonged walking and standing. She is scheduled for weight loss surgery in August. She continues follow up with orthopedics who does not recommend surgery at this time due to her BMI. She continues to take Percocet as needed with benefit. She denies any other health changes.     Interval History (7/19/2018):  The patient returns to clinic today for follow up. She is s/p left genicular nerve block on 7/10/2018. She reports 80% relief of her left knee pain. She reports that she was able to walk for longer distances (3 blocks) after the block. Her pain has returned to baseline. She continues to report left knee pain that is constant, sharp, and aching in nature. Her pain is worse with prolonged walking and standing. She denies any other health changes.     Interval History (8/21/2018):  The patient returns to clinic today for follow up. She is s/p left genicular cooled RFA on 7/31/2018. She reports 60% relief of her left knee pain. She reports intermittent knee pain that is sore and aching in nature. She continues to perform a home exercise routine. She is actively trying to lose weight. She continues to follow up with Bariatric Surgery at [facility]. She is considering weight loss surgery. She continues to use compound cream with benefit. She denies any other health changes.     Interval History (11/21/2018):  The patient returns to clinic today for follow up. She reports increased left knee pain this past week. She reports 2 episodes where her knee has "locked" up on her while walking. She describes her knee pain as sore and aching. She is actively trying to lose weight and quit smoking. She continues to use the compound cream with benefit. She denies any other health changes.     Interval History (1/9/2019):  The patient returns to clinic today for follow up. She is s/p left knee steroid injection on " "12/21/2018. She reports one day of relief. She continues to report left knee pain that is constant, sharp, and aching in nature. Her pain is worse with standing and walking. She reports that her knee "locks" up on her while walking. She often feels as though she is going to fall. She is scheduled for bariatric weight loss surgery in February at [facility]. She denies any other health changes.     Physical Therapy: Yes, she did this in 2018 for one month (twice a week). She does HEP (stretching and ROM in the morning)    Non-pharmacologic Treatment:     · Ice/Heat: Heat is more helpful than ice  · TENS: Not tried  · Massage: Not tried  · Chiropractic care: Not tried  · Acupuncture: Not tried  · Other: Uses a cane         Pain Medications:         · Currently taking: Gabapentin 300 mg/600 mg, Percocet 5/325 mg BID PRN    · Has tried in the past:  Ibuprofen (still takes sometimes), Voltaren gel    · Has not tried: Tylenol, Muscle relaxants, TCAs, SNRIs, Lyrica, compound topical creams    Blood thinners: None    Interventional Therapies: [id number] injections by Dr. Iyer were helpful  · 5/8/2018- Left knee Synvisc One injection  · 7/10/2018- Left genicular nerve block  · 7/31/2018- Left genicular cooled RFA  · 12/21/2018- Left knee steroid injection    Relevant Surgeries: None    Affecting sleep? No    Affecting daily activities? Yes    Depressive symptoms? No          · SI/HI? No    Work status: No, on disability due to her knee    Prescription Monitoring Program database:  Reviewed and consistent with medication use as prescribed.    Pain Scales  Best: 3/10  Worst: 10/10  Usually: 3/10  Today: 10/10    Knee Pain    The pain is present in the left knee. This is a chronic problem. The current episode started more than 1 year ago. The problem occurs daily. The problem has been gradually worsening. The quality of the pain is described as aching, burning, sharp, tight and numb. The pain is at a severity of 8/10. Associated " symptoms include an inability to bear weight. The symptoms are aggravated by walking and standing. She has tried injection treatment, exercise, cold, heat, movement and oral narcotics for the symptoms. The treatment provided mild relief. Physical therapy was effective.  Leg Pain    Associated symptoms include an inability to bear weight.       Last 3 PDI Scores 1/9/2019 11/21/2018 7/19/2018   Pain Disability Index (PDI) 70 28 40   ]    Review of Systems   Musculoskeletal: Positive for joint pain.        Left knee pain             Past Medical History:   Diagnosis Date    Cataract     Dry eye syndrome     GERD (gastroesophageal reflux disease)     Glaucoma suspect     Hypertension        Past Surgical History:   Procedure Laterality Date    [id number] Left 7/31/2018    [facility]    APPENDECTOMY      BLOCK, NERVE Left 7/10/2018    [facility]    CATARACT EXTRACTION W/  INTRAOCULAR LENS IMPLANT Bilateral     MFIOL OU    cataract surgery   2017    Injection LEFT KNEE STEROID INJECTION Left 12/21/2018    [facility]    INJECTION-JOINT Left 5/8/2018    [facility] at [facility]       Review of patient's allergies indicates:  No Known Allergies    Current Outpatient Medications   Medication Sig Dispense Refill    amLODIPine (NORVASC) 5 MG tablet       B-complex with vitamin C (Z-BEC OR EQUIV) tablet       CALCIUM CITRATE-VITAMIN D2 ORAL       gabapentin (NEURONTIN) 300 MG capsule       lisinopril (PRINIVIL,ZESTRIL) 40 MG tablet       multivitamin with minerals tablet       nicotine (NICODERM CQ) 14 mg/24 hr Place 1 patch onto the skin once daily. 28 patch 0    nicotine polacrilex (NICORETTE) 4 MG Gum Take 1 each (4 mg total) by mouth as needed. 6-9 pieces as needed daily to replace cigarette 100 each 0    omeprazole (PRILOSEC) 40 MG capsule     "   pantoprazole (PROTONIX) 40 MG tablet       sucralfate (CARAFATE) 1 gram tablet       VITAMIN B-12 5,000 mcg Subl       oxyCODONE-acetaminophen (PERCOCET) 5-325 mg per tablet Take 1 tablet by mouth every 6 (six) hours as needed for Pain. 20 tablet 0     No current facility-administered medications for this visit.        Family History   Problem Relation Age of Onset    No Known Problems Mother     No Known Problems Father        Social History     Socioeconomic History    Marital status: Single     Spouse name: Not on file    Number of children: Not on file    Years of education: Not on file    Highest education level: Not on file   Social Needs    Financial resource strain: Not on file    Food insecurity - worry: Not on file    Food insecurity - inability: Not on file    Transportation needs - medical: Not on file    Transportation needs - non-medical: Not on file   Occupational History    Not on file   Tobacco Use    Smoking status: Current Some Day Smoker     Packs/day: 0.25     Years: 50.00     Pack years: 12.50     Types: Cigarettes    Smokeless tobacco: Never Used   Substance and Sexual Activity    Alcohol use: Yes     Comment: rarely     Drug use: No    Sexual activity: Not on file   Other Topics Concern    Not on file   Social History Narrative    Not on file       Objective:     Vitals:    01/09/19 1131   BP: (!) 141/74   Pulse: 85   Temp: 99.1 °F (37.3 °C)   Weight: (!) 163.4 kg (360 lb 3.7 oz)   Height: 5' 6" (1.676 m)   PainSc: 10-Worst pain ever   PainLoc: Knee       GEN:  Well developed, well nourished.  No acute distress. No pain behavior.  HEENT:  No trauma.  Mucous membranes moist.  Nares patent bilaterally.  PSYCH: Normal affect. Thought content appropriate.  CHEST:  Breathing symmetric.  No audible wheezing.  ABD:   · Soft, non-distended.    SKIN:  Warm, pink, dry.  No rash on exposed areas.    EXT:  No cyanosis, clubbing, or edema.  No color change or changes in nail or " hair growth.  NEURO/MUSCULOSKELETAL:  Fully alert, oriented, and appropriate. Speech normal francesca. No cranial nerve deficits.   Gait: Normal.     Motor Strength: 5/5 motor strength throughout lower extremities.     Right knee  Inspection: No erythema, swelling, or redness  ROM: Full with pain on extension.   Palpation: No pes anserine tenderness and positive crepitus. No patellar tendon tenderness and No patellofemoral tendon tenderness.  No instability on exam.    Left knee  Inspection: No erythema, swelling, or redness  ROM: Limited with pain on flexion and extension.   Palpation: Positive pes anserine tenderness and Positive crepitus. Positive patellar tendon tenderness and Positive patellofemoral tendon tenderness.  No instability on exam.      Imaging:      MRI Left Knee 7/21/2017 (in media):  The medial meniscus is intact. The lateral meniscus is intact.     A chronic complete ACL tear is noted. The posterior cruciate ligament is intact. The medial and lateral retinacula are intact. The medial collateral ligament is intact. The lateral collateral ligament in intact. The posterolateral corner structures are intact.     There is full thickness fissuring of the central aspect medial femoral cartilage. The lateral tibiofemoral compartment cartilage is intact. There is broad thickness defect within the central trochlear cartilage. There is mild lateral patellofemoral cartilage thinning and regional full thickness loss of the central superior patellar cartilage.     A small effusion is present. There is trace infrapatellar fluid. Tiny popliteal cyst is noted. Subtle focal marrow edema is seen within the posterior tibial plateau. The bone marrow signal is heterogeneous likely reflecting marrow reconversion.     There is trace pes anserine bursitis. The tendons are otherwise normal.     Grade 2 fatty streaking of the semimembranosus and vastus lateralis muscles are noted. There is mild prepatellar edema.     MRI  Right Shoulder 7/21/2017 (in media):   There is a complete tear of the supraspinatus tendon with retraction to the glenohumeral joint. There is partial tearing of the anterior infraspinatus articular surface. Mild subscapularis tendinosis is noted. The teres minor is intact. A small amount of subacromial/subdeltoid fluid is noted. The proximal long head biceps tendon is poorly visualized proximally although intact distally.     Degenerative tearing of the anterosuperior through posterosuperior labrum is noted. There is moderate to severe superior glenoid cartilage thinning with subchondral degenerative changes. The glenohumeral ligaments appear grossly intact.     An os acromiale is seen with fluid and osteophytosis at its junction with the base of the acromion. There is moderate superior subluxation of the AC joint with mild osteophytosis.     Small effusion with subcoracoid extension is noted.     There is grade 2/3 fatty atrophy of the supraspinatus muscle, grade 2 of the infraspinatus and subscapularis. Heterogeneous signal is seen throughout the bone marrow likely reflecting marrow reconversion.       Assessment:     Encounter Diagnoses   Name Primary?    Chronic pain of left knee Yes    Primary osteoarthritis of left knee     Rupture of anterior cruciate ligament of left knee, subsequent encounter        Plan:     Ca was seen today for follow-up.    Diagnoses and all orders for this visit:    Chronic pain of left knee    Primary osteoarthritis of left knee    Rupture of anterior cruciate ligament of left knee, subsequent encounter       Her pain is consistent with the above.    We discussed the assessment and recommendations.  All available images were reviewed. We discussed the disease process, prognosis, treatment plan, and risks and benefits. The patient is aware of the risks and benefits of the medications being prescribed, common side effects, and proper usage. The following is the plan we agreed  on:     1. Schedule for left knee Euflexxa injections x3.   2. Consult Sports Medicine.   3. She is s/p left genicular cooled RFA. We can repeat this as needed.   4. In the future, we can consider right genicular nerve block.   5. Continue weight loss strategies.   6. Continue Percocet per PCP.   7. Continue compound cream.   8. RTC for above procedure.      - Dr. Vidal was consulted on the patient and agrees with this plan.    The above plan and management options were discussed at length with patient. Patient is in agreement with the above and verbalized understanding.     Savanna Hyatt NP  01/09/2019

## 2019-01-10 NOTE — PROGRESS NOTES
Individual Follow-Up Form    1/10/2019    Quit Date:     Clinical Status of Patient: Outpatient    Length of Service: 15 minutes    Continuing Medication: yes  Patches    Other Medications: none     Target Symptoms: Withdrawal and medication side effects. The following were  rated moderate (3) to severe (4) on TCRS:  · Moderate (3): none  · Severe (4): none    Comments: Patient reports smoking 1 cpd this week.  Patient was encouraged to attempt complete tobacco cessation.  We reviewed how to attempt a successful quit.  Patient was reminded of reasons and benefits of quitting.  She reports lighting one cigarette in the morning while drinking coffee only taking a few puffs and letting the rest burn out.  We reviewed how to break habitual behavior.  Patient wants to be tobacco free by surgery next month. Patient remains on prescribed tobacco cessation medication regimen of 14 mg patch without any negative side effects at this time.The patient denies any abnormal behavioral or mental changes at this time. The patient will continue with individual therapy sessions and medication monitoring by CTTS. Prescribed medication management will be by physician.       Diagnosis: F17.210    Next Visit: 1 week

## 2019-01-15 ENCOUNTER — HOSPITAL ENCOUNTER (OUTPATIENT)
Dept: RADIOLOGY | Facility: HOSPITAL | Age: 69
Discharge: HOME | End: 2019-01-15
Attending: PHYSICIAN ASSISTANT
Payer: MEDICARE

## 2019-01-15 ENCOUNTER — OFFICE VISIT (OUTPATIENT)
Dept: SPORTS MEDICINE | Facility: CLINIC | Age: 69
End: 2019-01-15
Payer: MEDICARE

## 2019-01-15 VITALS
WEIGHT: 293 LBS | DIASTOLIC BLOOD PRESSURE: 88 MMHG | HEIGHT: 66 IN | SYSTOLIC BLOOD PRESSURE: 150 MMHG | HEART RATE: 97 BPM | BODY MASS INDEX: 47.09 KG/M2

## 2019-01-15 DIAGNOSIS — S83.412A SPRAIN OF MEDIAL COLLATERAL LIGAMENT OF LEFT KNEE, INITIAL ENCOUNTER: ICD-10-CM

## 2019-01-15 DIAGNOSIS — M23.52 OLD COMPLETE TEAR OF ANTERIOR CRUCIATE LIGAMENT OF LEFT KNEE: ICD-10-CM

## 2019-01-15 DIAGNOSIS — M25.562 LEFT KNEE PAIN, UNSPECIFIED CHRONICITY: ICD-10-CM

## 2019-01-15 DIAGNOSIS — G89.29 CHRONIC PAIN OF LEFT KNEE: Primary | ICD-10-CM

## 2019-01-15 DIAGNOSIS — E66.01 MORBID OBESITY WITH BMI OF 50.0-59.9, ADULT: ICD-10-CM

## 2019-01-15 DIAGNOSIS — M25.562 CHRONIC PAIN OF LEFT KNEE: Primary | ICD-10-CM

## 2019-01-15 PROCEDURE — 3079F DIAST BP 80-89 MM HG: CPT | Mod: CPTII,S$GLB,, | Performed by: PHYSICIAN ASSISTANT

## 2019-01-15 PROCEDURE — 99213 OFFICE O/P EST LOW 20 MIN: CPT | Mod: S$GLB,,, | Performed by: PHYSICIAN ASSISTANT

## 2019-01-15 PROCEDURE — 3077F PR MOST RECENT SYSTOLIC BLOOD PRESSURE >= 140 MM HG: ICD-10-PCS | Mod: CPTII,S$GLB,, | Performed by: PHYSICIAN ASSISTANT

## 2019-01-15 PROCEDURE — 1100F PR PT FALLS ASSESS DOC 2+ FALLS/FALL W/INJURY/YR: ICD-10-PCS | Mod: CPTII,S$GLB,, | Performed by: PHYSICIAN ASSISTANT

## 2019-01-15 PROCEDURE — 1100F PTFALLS ASSESS-DOCD GE2>/YR: CPT | Mod: CPTII,S$GLB,, | Performed by: PHYSICIAN ASSISTANT

## 2019-01-15 PROCEDURE — 99213 PR OFFICE/OUTPT VISIT, EST, LEVL III, 20-29 MIN: ICD-10-PCS | Mod: S$GLB,,, | Performed by: PHYSICIAN ASSISTANT

## 2019-01-15 PROCEDURE — 99999 PR PBB SHADOW E&M-EST. PATIENT-LVL IV: ICD-10-PCS | Mod: PBBFAC,,, | Performed by: PHYSICIAN ASSISTANT

## 2019-01-15 PROCEDURE — 73564 X-RAY EXAM KNEE 4 OR MORE: CPT | Mod: 26,50,, | Performed by: RADIOLOGY

## 2019-01-15 PROCEDURE — 3079F PR MOST RECENT DIASTOLIC BLOOD PRESSURE 80-89 MM HG: ICD-10-PCS | Mod: CPTII,S$GLB,, | Performed by: PHYSICIAN ASSISTANT

## 2019-01-15 PROCEDURE — 99999 PR PBB SHADOW E&M-EST. PATIENT-LVL IV: CPT | Mod: PBBFAC,,, | Performed by: PHYSICIAN ASSISTANT

## 2019-01-15 PROCEDURE — 73564 XR KNEE ORTHO BILAT WITH FLEXION: ICD-10-PCS | Mod: 26,50,, | Performed by: RADIOLOGY

## 2019-01-15 PROCEDURE — 3077F SYST BP >= 140 MM HG: CPT | Mod: CPTII,S$GLB,, | Performed by: PHYSICIAN ASSISTANT

## 2019-01-15 PROCEDURE — 3288F PR FALLS RISK ASSESSMENT DOCUMENTED: ICD-10-PCS | Mod: CPTII,S$GLB,, | Performed by: PHYSICIAN ASSISTANT

## 2019-01-15 PROCEDURE — 73564 X-RAY EXAM KNEE 4 OR MORE: CPT | Mod: TC,50,FY,PO

## 2019-01-15 PROCEDURE — 3288F FALL RISK ASSESSMENT DOCD: CPT | Mod: CPTII,S$GLB,, | Performed by: PHYSICIAN ASSISTANT

## 2019-01-15 NOTE — LETTER
January 20, 2019      Savanna Hyatt, NP  [address]  [address]  [facility] [address]           [facility]  [address]  [facility] [address]  Phone: [phone]          Patient: Ca Coats   MR Number: [id number]   YOB: 1950   Date of Visit: 1/15/2019       Dear Savanna Hyatt:    Thank you for referring Ca Coats to me for evaluation. Attached you will find relevant portions of my assessment and plan of care.    If you have questions, please do not hesitate to call me. I look forward to following Ca Coats along with you.    Sincerely,    Barrett Ford III, PA-C    Enclosure  CC:  No Recipients    If you would like to receive this communication electronically, please contact [email or web address] or [phone] to request more information on [facility] access.    For providers and/or their staff who would like to refer a patient to [facility], please contact us through our one-stop-shop provider referral line, [facility], at [phone].    If you feel you have received this communication in error or would no longer like to receive these types of communications, please e-mail [email or web address]

## 2019-01-15 NOTE — PATIENT INSTRUCTIONS
You have a likely torn ACL and sprain (stretched) of the MCL with arthritis and possible meniscus tear of the knee. Wear brace at all times when walking or with activity. Return to our clinic after cleared for lap band surgery for MRI and to discuss possible surgical management of knee. Continue to decrease smoking.         ACL and PCL (Knee Ligament Injury)  Follow up with the referral doctor or healthcare provider, or as directed.  If an X-ray, CT scan, or MRI scan was done today, you will be notified of any new findings that may affect your care.  The knee is a hinge joint. It joins the thigh and shin bones. There are 2 ligaments inside the knee that protect the joint from too much forward and backward movement. The ACL, or anterior cruciate ligament, keeps the knee from sliding forward. The PCL, or posterior cruciate ligament, keeps the knee from sliding backward.  An ACL or PCL injury occurs when the ligament has been torn. The tear may be partial or complete. Symptoms include knee swelling, pain, and the joint becoming unstable. Some people hear a snap or pop at the time of an ACL injury.  ACL injuries are the most common. They often occur during quick changes in direction while playing sports. An ACL injury may happen at the same time as a cartilage injury of the knee.  The less common PCL injury occurs if the knee bends backwards, or with a direct blow to a bent knee (such as hitting the dashboard with your knee during a car accident).  The pain and swelling of a cruciate ligament injury can last several weeks. It may take 3 to 4 months for the ligament to fully heal.  An ACL or PCL diagnosis can often be made by physical exam. But pain and swelling right after the injury may limit the exam. An MRI may be used to confirm the diagnosis.  Early treatment includes resting the joint, wearing a splint to reduce movement, and using ice to reduce swelling and pain. You may use over-the-counter pain medicine,  unless another pain medicine was prescribed. Physical therapy will help you to regain joint and leg strength. Surgery is sometimes needed to treat a more severe cruciate ligament injury.     Home care  · Stay off the injured leg as much as possible until you can walk on it without pain. If you have a lot of pain with walking, crutches or a walker may be prescribed. These can be rented or purchased at many pharmacies and surgical or orthopedic supply stores. Follow your doctor's advice about when to begin bearing weight on that leg.  · Keep your leg raised to reduce pain and swelling. When sleeping, place a pillow under the injured leg. When sitting, support the injured leg so it is level with your waist. This is very important during the first 48 hours.  · Apply an ice pack over the injured area for 15 to 20 minutes. Do this every 3 to 6 hours on the first day for pain relief. Keep using ice 3 to 4 times a day until the pain and swelling goes away. To make an ice pack, put ice cubes in a plastic bag that seals at the top. Then wrap the bag in a clean, thin towel or cloth. You can place the ice pack directly over a splint. As the ice melts, be careful that the splint doesnt get wet. If you have a hook-and-loop knee immobilizer, you can open this to put the ice pack directly to the knee. But always wrap the ice pack in a towel or cloth first. Never put it directly on your skin.  · If you were given a plaster or fiberglass splint, keep it dry at all times. Bathe with your splint out of the water, protected with a large plastic bag. Seal the top end with a rubber band. If a fiberglass splint gets wet, you can dry it with a hair dryer on a cool setting. If you have a hook-and-loop fastening knee immobilizer, you can remove this to bathe, unless told otherwise.  · Your provider may prescribe physical therapy. The physical therapist will show you range of motion exercises to practice at home.  · You may  use over-the-counter pain medicine to control pain, unless another medicine was prescribed. If you have chronic liver or kidney disease or ever had a stomach ulcer or GI (gastrointestinal) bleeding, talk with your provider before using these medicines.  · Learn proper techniques for playing sports or exercising. Training programs can teach athletes how to reduce stress to the ACL. Protective bracing during sports may be helpful to prevent re-injury.  · Check with your provider before returning to sports or full work duties.  Follow-up care  Follow up with the referral doctor or healthcare provider, or as directed.  If an X-ray, CT scan, or MRI scan was done today, you will be notified of any new findings that may affect your care.  When to seek medical advice  Call your healthcare provider right away if any of these occur:  · Pain or swelling becomes worse  · Swelling, redness, or pain develop in the calf or thigh  · The knee becomes more unstable, giving out often or locking up  Call 911  Call 911 if you have:   · Chest pain   · Shortness of breath, with or without chest pain or discomfort  · Weakness or numbness in the arm, leg, or face.  Especially if it is only on 1 side  · Sudden confusion or trouble speaking  · Sudden severe headache with no known cause  Date Last Reviewed: 11/24/2015  © 4312-9418 [employer]. [address] [place] [address], [place] [address]. All rights reserved. This information is not intended as a substitute for professional medical care. Always follow your healthcare professional's instructions.        Understanding Medial Collateral Ligament Sprain    The knee is a complex joint where the thighbone (femur) meets the shinbone (tibia). Strong tissues called ligaments connect these bones together. Ligaments also keep the bones aligned, so the knee only bends how it is supposed to. The medial collateral ligament (MCL) runs across the knee joint on the medial side of the leg. Injury to  this ligament may be very painful. The knee may also not work the way it should.  Causes of an MCL sprain  An MCL sprain often happens when the knee joint is pushed beyond its normal range of motion. It is most common during a blow to the knee from the outside, pushing the knee inward. It may also happen if the knee is forced into a twist. These movements stretch and tear the MCL. Other parts of the knee may be damaged along with the MCL.  Symptoms of an MCL sprain  These include:  · Knee pain  · Knee swelling  · Locking of the joint  · Wobbly or unstable feeling in the joint  Treatment for an MCL sprain  Treatment will depend on the severity of the sprain and whether there is damage to other parts of the knee. Options often include:  · Rest. This allows the knee to heal. Activities that stress the knee should be avoided. Crutches, a knee brace, or both may also be recommended for a short time.  · Cold packs and elevation of the knee. These help reduce swelling and relieve pain.  · Compression. The knee may be wrapped with a bandage to help reduce swelling.  · Medicines. These help relieve pain and swelling.  · Exercises. These help improve the knees stability, strength, and range of motion.  If the injury is severe or several parts of the joint are involved, surgery may be an option. Surgery repairs the MCL and any other damaged structures.     When to call your healthcare provider  Call your healthcare provider right away if you have any of these:  · Pain, swelling, or instability that doesnt get better with treatment or gets worse  · New symptoms   Date Last Reviewed: 3/10/2016  © 6421-2525 [employer]. [address] [place] [address]. All rights reserved. This information is not intended as a substitute for professional medical care. Always follow your healthcare professional's instructions.

## 2019-01-17 ENCOUNTER — PROCEDURE VISIT (OUTPATIENT)
Dept: PAIN MEDICINE | Facility: CLINIC | Age: 69
End: 2019-01-17
Attending: ANESTHESIOLOGY
Payer: MEDICARE

## 2019-01-17 VITALS
RESPIRATION RATE: 20 BRPM | SYSTOLIC BLOOD PRESSURE: 148 MMHG | HEART RATE: 93 BPM | BODY MASS INDEX: 47.09 KG/M2 | WEIGHT: 293 LBS | HEIGHT: 66 IN | DIASTOLIC BLOOD PRESSURE: 80 MMHG | TEMPERATURE: 99 F

## 2019-01-17 DIAGNOSIS — M25.562 CHRONIC PAIN OF LEFT KNEE: Primary | ICD-10-CM

## 2019-01-17 DIAGNOSIS — M17.12 OSTEOARTHRITIS OF LEFT KNEE, UNSPECIFIED OSTEOARTHRITIS TYPE: ICD-10-CM

## 2019-01-17 DIAGNOSIS — G89.29 CHRONIC PAIN OF LEFT KNEE: Primary | ICD-10-CM

## 2019-01-17 PROCEDURE — 20611 DRAIN/INJ JOINT/BURSA W/US: CPT | Mod: LT,GC,S$GLB, | Performed by: ANESTHESIOLOGY

## 2019-01-17 PROCEDURE — 20611 PR DRAIN/ASP/INJECT MAJOR JOINT/BURSA W/US GUIDANCE: ICD-10-PCS | Mod: LT,GC,S$GLB, | Performed by: ANESTHESIOLOGY

## 2019-01-21 NOTE — PROGRESS NOTES
CC: left knee pain    68 y.o. Female Morbidly obese smoker, who reports anterior and medial knee pain refractory to conservative mgmt. She reports knee pain for 1.5 years that has been treated in pain management clinic with intra-articular steroid injections, viscosupplementation injections, and genicular nerve ablation with intermittent knee pain relief. She reports that 1 month ago she began feeling knee instability when walking and standing that she has never had before. Prior to this she would only have knee pain that was worse with walking and standing. She denies any injury or trauma prior to her knee instability starting.     She takes percocet daily to help with knee pain.     She reports that the pain is worse with steps.  It also bothers her at night.    Is affecting ADLs. She is having difficulty walking due to pain and instability now. She reports walking okay to due daily activities prior to 1-2 months ago.     No mechanical symptoms, + instability    She is currently trying to quit smoking and has greatly reduced the amount she smokes daily.     She is scheduled to undergo Gastric sleeve procedure in a few weeks.     Review of Systems   Constitution: Negative. Negative for chills, fever and night sweats.   HENT: Negative for congestion and headaches.    Eyes: Negative for blurred vision, left vision loss and right vision loss.   Cardiovascular: Negative for chest pain and syncope.   Respiratory: Negative for cough and shortness of breath.    Endocrine: Negative for polydipsia, polyphagia and polyuria.   Hematologic/Lymphatic: Negative for bleeding problem. Does not bruise/bleed easily.   Skin: Negative for dry skin, itching and rash.   Musculoskeletal: Negative for falls and muscle weakness.   Gastrointestinal: Negative for abdominal pain and bowel incontinence.   Genitourinary: Negative for bladder incontinence and nocturia.   Neurological: Negative for disturbances in coordination, loss of balance  and seizures.   Psychiatric/Behavioral: Negative for depression. The patient does not have insomnia.    Allergic/Immunologic: Negative for hives and persistent infections.   All other systems negative      PAST MEDICAL HISTORY:   Past Medical History:   Diagnosis Date    Cataract     Dry eye syndrome     GERD (gastroesophageal reflux disease)     Glaucoma suspect     Hypertension      PAST SURGICAL HISTORY:   Past Surgical History:   Procedure Laterality Date    IYSMPUUR-[id number] Left 7/31/2018    [facility]    APPENDECTOMY      BLOCK, NERVE Left 7/10/2018    [facility]    CATARACT EXTRACTION W/  INTRAOCULAR LENS IMPLANT Bilateral     MFIOL OU    cataract surgery   2017    Injection LEFT KNEE STEROID INJECTION Left 12/21/2018    [facility]    INJECTION-JOINT Left 5/8/2018    [facility]     FAMILY HISTORY:   Family History   Problem Relation Age of Onset    No Known Problems Mother     No Known Problems Father      SOCIAL HISTORY:   Social History     Socioeconomic History    Marital status: Single     Spouse name: Not on file    Number of children: Not on file    Years of education: Not on file    Highest education level: Not on file   Social Needs    Financial resource strain: Not on file    Food insecurity - worry: Not on file    Food insecurity - inability: Not on file    Transportation needs - medical: Not on file    Transportation needs - non-medical: Not on file   Occupational History    Not on file   Tobacco Use    Smoking status: Current Some Day Smoker     Packs/day: 0.25     Years: 50.00     Pack years: 12.50     Types: Cigarettes    Smokeless tobacco: Never Used   Substance and Sexual Activity    Alcohol use: Yes     Comment: rarely     Drug use: No    Sexual activity: Not on file   Other Topics Concern    Not  "on file   Social History Narrative    Not on file       MEDICATIONS:   Current Outpatient Medications:     amLODIPine (NORVASC) 5 MG tablet, , Disp: , Rfl:     B-complex with vitamin C (Z-BEC OR EQUIV) tablet, , Disp: , Rfl:     CALCIUM CITRATE-VITAMIN D2 ORAL, , Disp: , Rfl:     gabapentin (NEURONTIN) 300 MG capsule, , Disp: , Rfl:     lisinopril (PRINIVIL,ZESTRIL) 40 MG tablet, , Disp: , Rfl:     multivitamin with minerals tablet, , Disp: , Rfl:     nicotine (NICODERM CQ) 14 mg/24 hr, Place 1 patch onto the skin once daily., Disp: 28 patch, Rfl: 0    nicotine polacrilex (NICORETTE) 4 MG Gum, Take 1 each (4 mg total) by mouth as needed. 6-9 pieces as needed daily to replace cigarette, Disp: 100 each, Rfl: 0    omeprazole (PRILOSEC) 40 MG capsule, , Disp: , Rfl:     oxyCODONE-acetaminophen (PERCOCET) 5-325 mg per tablet, Take 1 tablet by mouth every 6 (six) hours as needed for Pain., Disp: 20 tablet, Rfl: 0    pantoprazole (PROTONIX) 40 MG tablet, , Disp: , Rfl:     sucralfate (CARAFATE) 1 gram tablet, , Disp: , Rfl:     VITAMIN B-12 5,000 mcg Subl, , Disp: , Rfl:   ALLERGIES: Review of patient's allergies indicates:  No Known Allergies    VITAL SIGNS: BP (!) 150/88   Pulse 97   Ht 5' 6" (1.676 m)   Wt (!) 163.3 kg (360 lb)   BMI 58.11 kg/m²      PHYSICAL EXAMINATION    General:  The patient is alert and oriented x 3.  Mood is pleasant.  Observation of ears, eyes and nose reveal no gross abnormalities.  HEENT: NCAT, sclera nonicteric  Lungs: Respirations are equal and unlabored.    left  KNEE EXAMINATION     OBSERVATION / INSPECTION   Gait:   Nonantalgic   Alignment:  Neutral   Scars:   None   Muscle atrophy: Mild  Effusion:  mild  Warmth:  None   Discoloration:   none     TENDERNESS / CREPITUS (T / C):          T / C      T / C   Patella   - / -   Lateral joint line   - / -      Peripatellar medial  +  Medial joint line    + / -   Peripatellar lateral +  Medial plica   - / -   Patellar tendon - "   Popliteal fossa  - / -   Quad tendon   -   Gastrocnemius   -   Prepatellar Bursa - / -   Quadricep   -   Tibial tubercle  -  Thigh/hamstring  -   Pes anserine/HS -  Fibula    -   ITB   - / -  Tibia     -   Tib/fib joint  - / -  LCL    -     MFC   - / -   MCL: Proximal  +    LFC   - / -    Distal   +          ROM: (* = pain)  PASSIVE   ACTIVE    Left :   10 / 0 / 100   10 / 0 / 100     Right :    10 / 0 / 120   10 / 0 / 120    Patellofemoral examination:  See above noted areas of tenderness.   Patella position    Subluxation / dislocation: Centered           Sup. / Inf;   Normal   Crepitus (PF):    Absent   Patellar Mobility:       Medial-lateral:   Normal    Superior-inferior:  Normal    Inferior tilt   Normal    Patellar tendon:  Normal   Lateral tilt:    Normal   J-sign:     None   Patellofemoral grind:   negative       MENISCAL SIGNS:     Pain on terminal extension:  -  Pain on terminal flexion:  +  Rafas maneuver:  -  Squat     NT    LIGAMENT EXAMINATION:  ACL / Lachman:  Guarding and pain; difficulty to test due to this and morbid obesity     PCL-Post.  drawer: normal 0 to 2mm  MCL- Valgus:  Grade 2 with pain compared to grade 1 on contralateral side  LCL- Varus:  normal 0 to 2mm  Pivot shift: normal (Equal)   Dial Test: difference c/w other side   At 30° flexion: normal (< 5°)    At 90° flexion: normal (< 5°)   Reverse Pivot Shift:   normal (Equal)     STRENGTH: (* = with pain) PAINFUL SIDE   Quadricep   4/5   Hamstrin/5    EXTREMITY NEURO-VASCULAR EXAMINATION:   Sensation:  Grossly intact to light touch all dermatomal regions.   Motor Function:  Fully intact motor function at hip, knee, foot and ankle    DTRs;  quadriceps and  achilles 2+.  No clonus and downgoing Babinski.    Vascular status:  DP and PT pulses 2+, brisk capillary refill, symmetric.     Other Findings:  Exam very difficult today due to morbid obesity and mobility issue by patient.     Xrays:  Xrays of the bilateral knees  with flexion were ordered and reviewed by me today. No fracture, subluxation. Mild DJD seen bilaterally.     MRI Left Knee 7/21/2017 (in media):  The medial meniscus is intact. The lateral meniscus is intact.      A chronic complete ACL tear is noted. The posterior cruciate ligament is intact. The medial and lateral retinacula are intact. The medial collateral ligament is intact. The lateral collateral ligament in intact. The posterolateral corner structures are intact.      There is full thickness fissuring of the central aspect medial femoral cartilage. The lateral tibiofemoral compartment cartilage is intact. There is broad thickness defect within the central trochlear cartilage. There is mild lateral patellofemoral cartilage thinning and regional full thickness loss of the central superior patellar cartilage.      ASSESSMENT:    1. left Knee pain  2. Left knee chronic acl tear   3. Left knee MCL sprain grade 2  4. Morbid obesity   Bilateral hip abd/core weakness    PLAN:    1. Patient was fitted with T-scope knee brace today to wear at all times except sleeping and bathing to help stabilize the knee. She was instructed on how to remove t -scope to get in shower or tub safely. She was instructed to use a walker to help ambulate safely due to her knee condition and morbid obesity.   [id number] Luciana Almeida, performed a custom orthotic / brace adjustment, fitting and training with the patient. The patient demonstrated understanding and proper care. This was performed for 15 minutes.  2. Ice compresses prn pain  3. Control current meds for pain control.     4. She does not have MRI disc for review. Will likely need to repeat MRI after stomach surgery.   5. Advised patient to proceed with gastric sleeve surgery as planned and once she is stable and cleared from that then return to our clinic to decide further treatment options for her knee.   She may not be a surgical candidate with us until weight loss occurs.      Patient was fine with this plan.     The total face-to-face encounter time with this patient was 50 minutes and greater than 50% of of the encounter time was spent counseling the patient and coordinating care. Significant time was also spent educating the patient, giving detail post-visit instructions, and discussing risks and benefits of treatment. Patient also had several specific questions that were answered during the visit today.      All questions were answered, pt will contact us for questions or concerns in the interim.

## 2019-01-23 ENCOUNTER — PROCEDURE VISIT (OUTPATIENT)
Dept: PAIN MEDICINE | Facility: CLINIC | Age: 69
End: 2019-01-23
Attending: ANESTHESIOLOGY
Payer: MEDICARE

## 2019-01-23 VITALS
DIASTOLIC BLOOD PRESSURE: 79 MMHG | RESPIRATION RATE: 18 BRPM | WEIGHT: 293 LBS | SYSTOLIC BLOOD PRESSURE: 140 MMHG | TEMPERATURE: 99 F | BODY MASS INDEX: 47.09 KG/M2 | HEIGHT: 66 IN | HEART RATE: 86 BPM

## 2019-01-23 DIAGNOSIS — G89.29 CHRONIC PAIN OF LEFT KNEE: Primary | ICD-10-CM

## 2019-01-23 DIAGNOSIS — M25.562 CHRONIC PAIN OF LEFT KNEE: Primary | ICD-10-CM

## 2019-01-23 DIAGNOSIS — M17.12 OSTEOARTHRITIS OF LEFT KNEE, UNSPECIFIED OSTEOARTHRITIS TYPE: ICD-10-CM

## 2019-01-23 PROCEDURE — 20611 PR DRAIN/ASP/INJECT MAJOR JOINT/BURSA W/US GUIDANCE: ICD-10-PCS | Mod: LT,GC,S$GLB, | Performed by: ANESTHESIOLOGY

## 2019-01-23 PROCEDURE — 20611 DRAIN/INJ JOINT/BURSA W/US: CPT | Mod: LT,GC,S$GLB, | Performed by: ANESTHESIOLOGY

## 2019-01-23 RX ORDER — BUPIVACAINE HYDROCHLORIDE 2.5 MG/ML
3 INJECTION, SOLUTION EPIDURAL; INFILTRATION; INTRACAUDAL ONCE
Status: COMPLETED | OUTPATIENT
Start: 2019-01-23 | End: 2019-01-23

## 2019-01-23 RX ORDER — LIDOCAINE HYDROCHLORIDE 10 MG/ML
3 INJECTION INFILTRATION; PERINEURAL
Status: COMPLETED | OUTPATIENT
Start: 2019-01-23 | End: 2019-01-23

## 2019-01-23 RX ADMIN — BUPIVACAINE HYDROCHLORIDE 7.5 MG: 2.5 INJECTION, SOLUTION EPIDURAL; INFILTRATION; INTRACAUDAL at 09:01

## 2019-01-23 RX ADMIN — LIDOCAINE HYDROCHLORIDE 3 ML: 10 INJECTION INFILTRATION; PERINEURAL at 09:01

## 2019-01-24 NOTE — PROCEDURES
"  Date of Procedure: 1/23/2019     Procedure: Left knee intra-articular injection with Euflexxa     Pre-op diagnosis: Left knee DJD; morbid obesity     Post-op diagnosis: Same      Physician: Dr. Samantha Vidal      Assistant:  Dr. Acevedo     Anesthestia: Local     EBL: None     Specimens: None     All medications, allergies, and relevant histories were reviewed. No recent antibiotics or infections.  A time-out was taken to verify the correct patient, procedure, laterality, and appropriate medications/allergies.     Intra-articular knee injection using US guidance:  Left     Pt was advised, consented and had questions answered prior to proceeding.  Pt was escorted to the procedure room and placed in the sitting position with the left knee flexed.  Sterile prep over the knee.  US guidance was used to located the knee joint space. A 25g Needle was used to administer 2cc of bicarbonated 1% lidocaine at needle insertion site.  A 22G 3.5" needle was advanced from the medial angle below the patella into the anterior portion of the knee joint.  2 cc of Euflexxa was injected after negative aspiration.  There was good spread throughout the joint under ultrasound. Next, 3 cc of bupivacaine 0.25% was injected. Needle was removed and a bandage was applied.     Special equipment and extra time required due to obesity.    The patient tolerated the procedure well and was discharged in excellent condition.  No complications noted.      Future Management:   If helpful, can repeat as needed.    If not, I will have her see our Sports Medicine Colleagues    I certify that I provided the above services.  I was present for the entire procedure, which was performed by myself with the assistance of the resident physician.  There were no parts of the procedure that were performed not by myself or without my direct supervision.    "

## 2019-01-24 NOTE — PROCEDURES
Date of Procedure: 1/17/2019     Procedure: Left knee intra-articular injection with Euflexxa     Pre-op diagnosis: Left knee DJD     Post-op diagnosis: Same      Physician: Dr. Samantha Vidal      Assistant:  Dr. Pena     Anesthestia: Local     EBL: None     Specimens: None     All medications, allergies, and relevant histories were reviewed. No recent antibiotics or infections.  A time-out was taken to verify the correct patient, procedure, laterality, and appropriate medications/allergies.     Intra-articular knee injection using US guidance:  Left     Pt was advised, consented and had questions answered prior to proceeding.  Pt was escorted to the procedure room and placed in the sitting position with the left knee flexed.  Sterile prep over the knee.  US guidance was used to located the knee joint space. A 25g Needle was used to administer 2cc of bicarbonated 1% lidocaine at needle insertion site.  A 22G 2 inch needle was advanced from the medial angle below the patella into the anterior portion of the knee joint.  2 cc of Euflexxa was injected after negative aspiration.  There was good spread throughout the joint under ultrasound. Next, 3 cc of bupivacaine 0.25% was injected. Needle was removed and a bandage was applied.     The patient tolerated the procedure well and was discharged in excellent condition.  No complications noted.      Future Management:   If helpful, can repeat as needed.    If not, I will have her see our Sports Medicine Colleagues    I certify that I provided the above services.  I was present for the entire procedure, which was performed by myself with the assistance of the resident physician.  There were no parts of the procedure that were performed not by myself or without my direct supervision.

## 2019-01-25 ENCOUNTER — TELEPHONE (OUTPATIENT)
Dept: PAIN MEDICINE | Facility: CLINIC | Age: 69
End: 2019-01-25

## 2019-01-25 NOTE — TELEPHONE ENCOUNTER
----- Message from Ifrah Stanton sent at 1/25/2019  2:58 PM CST -----  Contact: [facility]/[phone]  Name of Who is Calling: [facility]      What is the request in detail: Please call [facility] to start the PA for a cream.       Can the clinic reply by [facility]:no      What Number to Call Back if not in [facility]:[phone]

## 2019-01-30 ENCOUNTER — PROCEDURE VISIT (OUTPATIENT)
Dept: PAIN MEDICINE | Facility: CLINIC | Age: 69
End: 2019-01-30
Attending: ANESTHESIOLOGY
Payer: MEDICARE

## 2019-01-30 ENCOUNTER — TELEPHONE (OUTPATIENT)
Dept: PAIN MEDICINE | Facility: CLINIC | Age: 69
End: 2019-01-30

## 2019-01-30 VITALS
HEART RATE: 80 BPM | WEIGHT: 293 LBS | DIASTOLIC BLOOD PRESSURE: 81 MMHG | TEMPERATURE: 98 F | BODY MASS INDEX: 47.09 KG/M2 | SYSTOLIC BLOOD PRESSURE: 131 MMHG | HEIGHT: 66 IN

## 2019-01-30 DIAGNOSIS — G89.29 CHRONIC PAIN OF LEFT KNEE: ICD-10-CM

## 2019-01-30 DIAGNOSIS — M17.12 PRIMARY OSTEOARTHRITIS OF LEFT KNEE: Primary | ICD-10-CM

## 2019-01-30 DIAGNOSIS — M25.562 CHRONIC PAIN OF LEFT KNEE: ICD-10-CM

## 2019-01-30 PROCEDURE — 20611 DRAIN/INJ JOINT/BURSA W/US: CPT | Mod: LT,S$GLB,, | Performed by: ANESTHESIOLOGY

## 2019-01-30 PROCEDURE — 20611 PR DRAIN/ASP/INJECT MAJOR JOINT/BURSA W/US GUIDANCE: ICD-10-PCS | Mod: LT,S$GLB,, | Performed by: ANESTHESIOLOGY

## 2019-01-30 NOTE — PROCEDURES
She returns today for her 3rd injection with Euflexxa.  No new symptomatology.  No untoward side effects.    Past Medical History:   Diagnosis Date    Cataract     Dry eye syndrome     GERD (gastroesophageal reflux disease)     Glaucoma suspect     Hypertension        Past Surgical History:   Procedure Laterality Date    [id number] Left 7/31/2018    [facility] by [facility] at [facility]    APPENDECTOMY      BLOCK, NERVE Left 7/10/2018    [facility]    CATARACT EXTRACTION W/  INTRAOCULAR LENS IMPLANT Bilateral     MFIOL OU    cataract surgery   2017    Injection LEFT KNEE STEROID INJECTION Left 12/21/2018    [facility]    INJECTION-JOINT Left 5/8/2018    [facility] by [facility] at [facility]       Review of patient's allergies indicates:  No Known Allergies    Current Outpatient Medications   Medication Sig Dispense Refill    amLODIPine (NORVASC) 5 MG tablet       B-complex with vitamin C (Z-BEC OR EQUIV) tablet       CALCIUM CITRATE-VITAMIN D2 ORAL       gabapentin (NEURONTIN) 300 MG capsule       lisinopril (PRINIVIL,ZESTRIL) 40 MG tablet       multivitamin with minerals tablet       nicotine (NICODERM CQ) 14 mg/24 hr Place 1 patch onto the skin once daily. 28 patch 0    nicotine polacrilex (NICORETTE) 4 MG Gum Take 1 each (4 mg total) by mouth as needed. 6-9 pieces as needed daily to replace cigarette 100 each 0    omeprazole (PRILOSEC) 40 MG capsule       oxyCODONE-acetaminophen (PERCOCET) 5-325 mg per tablet Take 1 tablet by mouth every 6 (six) hours as needed for Pain. 20 tablet 0    pantoprazole (PROTONIX) 40 MG tablet       sucralfate (CARAFATE) 1 gram tablet       VITAMIN B-12 5,000 mcg Subl        No current facility-administered medications for this visit.        Family History   Problem Relation Age of Onset    No Known Problems Mother     No Known Problems  "Father        Social History     Socioeconomic History    Marital status: Single     Spouse name: Not on file    Number of children: Not on file    Years of education: Not on file    Highest education level: Not on file   Social Needs    Financial resource strain: Not on file    Food insecurity - worry: Not on file    Food insecurity - inability: Not on file    Transportation needs - medical: Not on file    Transportation needs - non-medical: Not on file   Occupational History    Not on file   Tobacco Use    Smoking status: Current Some Day Smoker     Packs/day: 0.25     Years: 50.00     Pack years: 12.50     Types: Cigarettes    Smokeless tobacco: Never Used   Substance and Sexual Activity    Alcohol use: Yes     Comment: rarely     Drug use: No    Sexual activity: Not on file   Other Topics Concern    Not on file   Social History Narrative    Not on file       On exam  /81   Pulse 80   Temp 98.3 °F (36.8 °C)   Ht 5' 6" (1.676 m)   Wt (!) 164.5 kg (362 lb 10.5 oz)   BMI 58.53 kg/m²   Normocephalic.  Atraumatic.  Affect appropriate.  Breathing unlabored.  Extra ocular muscles intact.  Pain Disability Index Review:  Last 3 PDI Scores 1/30/2019 1/23/2019 1/17/2019   Pain Disability Index (PDI) 24 42 37     Assessment  1. Primary osteoarthritis of left knee  sodium hyaluronate (EUFLEXXA) 10 mg/mL(mw 2.4 -3.6 million) Syrg 20 mg   2. Chronic pain of left knee  sodium hyaluronate (EUFLEXXA) 10 mg/mL(mw 2.4 -3.6 million) Syrg 20 mg     Plan  We discussed with the patient the assessment and recommendations. The following is the plan we agreed on:  One.  1.  Procedure:  Under clean technique, ultrasound guidance and after discussing with the patient we injected the left knee through medial approach.  Numbing was using a mixture of bupivacaine and Xylocaine total 4 mL and a 27 gauge needle. The Euflexxa was injected using a 22 gauge needle.  The patient tolerated procedure well and had significant " improvement of her symptomatology.  2.  Return as needed.  Otherwise, follow-up in 1 month with the nurse practitioner

## 2019-01-30 NOTE — TELEPHONE ENCOUNTER
Patient's prior authorization will be refaxed as requested in message.     Staff could not contact Kaushal angel Faith to inform him that his request is being completed as there was no phone number in message.

## 2019-01-30 NOTE — TELEPHONE ENCOUNTER
----- Message from Edith Weiss sent at 1/30/2019  4:03 PM CST -----  Contact: Marcell/Kaushal  Name of Who is Calling: Kaushal        What is the request in detail: Please refax pt's prior authorization for cream to [phone]          Can the clinic reply by [facility]:N     What Number to Call Back if not in [facility]:

## 2019-02-28 ENCOUNTER — OFFICE VISIT (OUTPATIENT)
Dept: PAIN MEDICINE | Facility: CLINIC | Age: 69
End: 2019-02-28
Payer: MEDICARE

## 2019-02-28 VITALS
SYSTOLIC BLOOD PRESSURE: 146 MMHG | TEMPERATURE: 99 F | WEIGHT: 293 LBS | BODY MASS INDEX: 47.09 KG/M2 | HEART RATE: 89 BPM | HEIGHT: 66 IN | DIASTOLIC BLOOD PRESSURE: 78 MMHG

## 2019-02-28 DIAGNOSIS — M25.562 CHRONIC PAIN OF LEFT KNEE: ICD-10-CM

## 2019-02-28 DIAGNOSIS — M17.12 PRIMARY OSTEOARTHRITIS OF LEFT KNEE: Primary | ICD-10-CM

## 2019-02-28 DIAGNOSIS — G89.29 CHRONIC PAIN OF LEFT KNEE: ICD-10-CM

## 2019-02-28 PROCEDURE — 3078F DIAST BP <80 MM HG: CPT | Mod: CPTII,S$GLB,, | Performed by: NURSE PRACTITIONER

## 2019-02-28 PROCEDURE — 3077F PR MOST RECENT SYSTOLIC BLOOD PRESSURE >= 140 MM HG: ICD-10-PCS | Mod: CPTII,S$GLB,, | Performed by: NURSE PRACTITIONER

## 2019-02-28 PROCEDURE — 99999 PR PBB SHADOW E&M-EST. PATIENT-LVL III: CPT | Mod: PBBFAC,,, | Performed by: NURSE PRACTITIONER

## 2019-02-28 PROCEDURE — 3077F SYST BP >= 140 MM HG: CPT | Mod: CPTII,S$GLB,, | Performed by: NURSE PRACTITIONER

## 2019-02-28 PROCEDURE — 3078F PR MOST RECENT DIASTOLIC BLOOD PRESSURE < 80 MM HG: ICD-10-PCS | Mod: CPTII,S$GLB,, | Performed by: NURSE PRACTITIONER

## 2019-02-28 PROCEDURE — 1101F PT FALLS ASSESS-DOCD LE1/YR: CPT | Mod: CPTII,S$GLB,, | Performed by: NURSE PRACTITIONER

## 2019-02-28 PROCEDURE — 99999 PR PBB SHADOW E&M-EST. PATIENT-LVL III: ICD-10-PCS | Mod: PBBFAC,,, | Performed by: NURSE PRACTITIONER

## 2019-02-28 PROCEDURE — 99213 PR OFFICE/OUTPT VISIT, EST, LEVL III, 20-29 MIN: ICD-10-PCS | Mod: S$GLB,,, | Performed by: NURSE PRACTITIONER

## 2019-02-28 PROCEDURE — 99213 OFFICE O/P EST LOW 20 MIN: CPT | Mod: S$GLB,,, | Performed by: NURSE PRACTITIONER

## 2019-02-28 PROCEDURE — 1101F PR PT FALLS ASSESS DOC 0-1 FALLS W/OUT INJ PAST YR: ICD-10-PCS | Mod: CPTII,S$GLB,, | Performed by: NURSE PRACTITIONER

## 2019-02-28 NOTE — PROGRESS NOTES
"Subjective:     Patient ID: Ca Coats is a 68 y.o. female.    Chief Complaint: Pain    Consulted by: Self      Disclaimer: This note was generated using voice recognition software.  There may be a typographical errors that were missed during proofreading.      HPI:    aC Coats is a 68 y.o. female who presents today with left knee pain. Her pain has been going on for "too long."  She was previously treated by Dr. Iyer at [facility].  She has injections [id number] with him.  These were helpful, but she does not know if the injections were steroid or viscosupplementation.   This pain is described in detail below.    Interval History (4/30/2018):  The patient returns to clinic today for follow up and records review. We did received records from [facility] including a MRI of her knee. Dr. Iyer's last office visit note from January 2017 does not include any previous injections. She continues to report bilateral knee pain, left greater than right. She describes this pain as constant and aching. This pain is worse with prolonged walking. She also report right shoulder pain with prolonged overhead activity. She continues to take Percocet with benefit. She denies any other health changes.    Interval History (5/31/2018):  The patient returns to clinic for follow up. She is s/p left knee Synvisc One injection on 5/8/2018. She reports 35% relief of her knee pain. She continues to report bilateral knee pain, left greater than right. She describes this pain as constant and aching. Her pain is worse with prolonged walking and standing. She is scheduled to see Orthopedics at the  End of this month. She is also following up with Bariatrics to discuss the lap band procedure. She continues to take Percocet with benefit. She also uses Voltaren gel with benefit but this is expensive for her. She denies any other health changes.         Interval History (7/2/2018):  The patient returns to clinic today for " "follow up. She continues to report left knee pain that is constant, sharp, and aching in nature. Her pain is worse with prolonged walking and standing. She is scheduled for weight loss surgery in August. She continues follow up with orthopedics who does not recommend surgery at this time due to her BMI. She continues to take Percocet as needed with benefit. She denies any other health changes.     Interval History (7/19/2018):  The patient returns to clinic today for follow up. She is s/p left genicular nerve block on 7/10/2018. She reports 80% relief of her left knee pain. She reports that she was able to walk for longer distances (3 blocks) after the block. Her pain has returned to baseline. She continues to report left knee pain that is constant, sharp, and aching in nature. Her pain is worse with prolonged walking and standing. She denies any other health changes.     Interval History (8/21/2018):  The patient returns to clinic today for follow up. She is s/p left genicular cooled RFA on 7/31/2018. She reports 60% relief of her left knee pain. She reports intermittent knee pain that is sore and aching in nature. She continues to perform a home exercise routine. She is actively trying to lose weight. She continues to follow up with Bariatric Surgery at [facility]. She is considering weight loss surgery. She continues to use compound cream with benefit. She denies any other health changes.     Interval History (11/21/2018):  The patient returns to clinic today for follow up. She reports increased left knee pain this past week. She reports 2 episodes where her knee has "locked" up on her while walking. She describes her knee pain as sore and aching. She is actively trying to lose weight and quit smoking. She continues to use the compound cream with benefit. She denies any other health changes.     Interval History (1/9/2019):  The patient returns to clinic today for follow up. She is s/p left knee steroid injection on " "12/21/2018. She reports one day of relief. She continues to report left knee pain that is constant, sharp, and aching in nature. Her pain is worse with standing and walking. She reports that her knee "locks" up on her while walking. She often feels as though she is going to fall. She is scheduled for bariatric weight loss surgery in February at [facility]. She denies any other health changes.     Interval History (2/28/2019):  The patient returns to clinic today for follow up. She is s/p Euflexxa series completed on 1/30/2019. She reports that she able to stand for longer periods of time since the procedure. She continues to report left knee pain that is sore and aching in nature. Her pain is worse with prolonged standing and walking. She was previously scheduled for weight loss surgery but reports this was canceled. She is scheduled for follow up next week about this. She denies any other health changes.     Physical Therapy: Yes, she did this in 2018 for one month (twice a week). She does HEP (stretching and ROM in the morning)    Non-pharmacologic Treatment:     · Ice/Heat: Heat is more helpful than ice  · TENS: Not tried  · Massage: Not tried  · Chiropractic care: Not tried  · Acupuncture: Not tried  · Other: Uses a cane         Pain Medications:         · Currently taking: Gabapentin 300 mg/600 mg, Percocet 5/325 mg BID PRN    · Has tried in the past:  Ibuprofen (still takes sometimes), Voltaren gel    · Has not tried: Tylenol, Muscle relaxants, TCAs, SNRIs, Lyrica, compound topical creams    Blood thinners: None    Interventional Therapies: [id number] injections by Dr. Iyer were helpful  · 5/8/2018- Left knee Synvisc One injection  · 7/10/2018- Left genicular nerve block  · 7/31/2018- Left genicular cooled RFA  · 12/21/2018- Left knee steroid injection  · 1/31/2019- Left knee Euflexxa series    Relevant Surgeries: None    Affecting sleep? No    Affecting daily activities? Yes    Depressive symptoms? No        "   · SI/HI? No    Work status: No, on disability due to her knee    Prescription Monitoring Program database:  Reviewed and consistent with medication use as prescribed.    Pain Scales  Best: 3/10  Worst: 10/10  Usually: 3/10  Today: 7/10    Knee Pain    The pain is present in the left knee. This is a chronic problem. The current episode started more than 1 year ago. The problem occurs daily. The problem has been gradually worsening. The quality of the pain is described as aching, burning, sharp, tight and numb. The pain is at a severity of 8/10. Associated symptoms include an inability to bear weight. The symptoms are aggravated by walking and standing. She has tried injection treatment, exercise, cold, heat, movement and oral narcotics for the symptoms. The treatment provided mild relief. Physical therapy was effective.  Leg Pain    Associated symptoms include an inability to bear weight.       Last 3 PDI Scores 1/30/2019 1/23/2019 1/17/2019   Pain Disability Index (PDI) 24 42 37   ]    Review of Systems   Musculoskeletal: Positive for joint pain.        Left knee pain             Past Medical History:   Diagnosis Date    Cataract     Dry eye syndrome     GERD (gastroesophageal reflux disease)     Glaucoma suspect     Hypertension        Past Surgical History:   Procedure Laterality Date    [id number] Left 7/31/2018    [facility]    APPENDECTOMY      BLOCK, NERVE Left 7/10/2018    [facility]    CATARACT EXTRACTION W/  INTRAOCULAR LENS IMPLANT Bilateral     MFIOL OU    cataract surgery   2017    Injection LEFT KNEE STEROID INJECTION Left 12/21/2018    [facility]    INJECTION-JOINT Left 5/8/2018    [facility] by [facility]       Review of patient's allergies indicates:  No Known Allergies    Current Outpatient Medications   Medication Sig  Dispense Refill    amLODIPine (NORVASC) 5 MG tablet       B-complex with vitamin C (Z-BEC OR EQUIV) tablet       CALCIUM CITRATE-VITAMIN D2 ORAL       gabapentin (NEURONTIN) 300 MG capsule       lisinopril (PRINIVIL,ZESTRIL) 40 MG tablet       multivitamin with minerals tablet       nicotine (NICODERM CQ) 14 mg/24 hr Place 1 patch onto the skin once daily. 28 patch 0    nicotine polacrilex (NICORETTE) 4 MG Gum Take 1 each (4 mg total) by mouth as needed. 6-9 pieces as needed daily to replace cigarette 100 each 0    omeprazole (PRILOSEC) 40 MG capsule       oxyCODONE-acetaminophen (PERCOCET) 5-325 mg per tablet Take 1 tablet by mouth every 6 (six) hours as needed for Pain. 20 tablet 0    pantoprazole (PROTONIX) 40 MG tablet       sucralfate (CARAFATE) 1 gram tablet       VITAMIN B-12 5,000 mcg Subl        No current facility-administered medications for this visit.        Family History   Problem Relation Age of Onset    No Known Problems Mother     No Known Problems Father        Social History     Socioeconomic History    Marital status: Single     Spouse name: Not on file    Number of children: Not on file    Years of education: Not on file    Highest education level: Not on file   Social Needs    Financial resource strain: Not on file    Food insecurity - worry: Not on file    Food insecurity - inability: Not on file    Transportation needs - medical: Not on file    Transportation needs - non-medical: Not on file   Occupational History    Not on file   Tobacco Use    Smoking status: Current Some Day Smoker     Packs/day: 0.25     Years: 50.00     Pack years: 12.50     Types: Cigarettes    Smokeless tobacco: Never Used   Substance and Sexual Activity    Alcohol use: Yes     Comment: rarely     Drug use: No    Sexual activity: Not on file   Other Topics Concern    Not on file   Social History Narrative    Not on file       Objective:     Vitals:    02/28/19 1345   BP: (!) 146/78  "  Pulse: 89   Temp: 98.7 °F (37.1 °C)   Weight: (!) 164.6 kg (362 lb 14 oz)   Height: 5' 6" (1.676 m)   PainSc:   7   PainLoc: Knee       GEN:  Well developed, well nourished.  No acute distress. No pain behavior.  HEENT:  No trauma.  Mucous membranes moist.  Nares patent bilaterally.  PSYCH: Normal affect. Thought content appropriate.  CHEST:  Breathing symmetric.  No audible wheezing.  ABD:   · Soft, non-distended.    SKIN:  Warm, pink, dry.  No rash on exposed areas.    EXT:  No cyanosis, clubbing, or edema.  No color change or changes in nail or hair growth.  NEURO/MUSCULOSKELETAL:  Fully alert, oriented, and appropriate. Speech normal francesca. No cranial nerve deficits.   Gait: Normal.     Motor Strength: 5/5 motor strength throughout lower extremities.     Right knee  Inspection: No erythema, swelling, or redness  ROM: Full with pain on extension.   Palpation: No pes anserine tenderness and positive crepitus. No patellar tendon tenderness and No patellofemoral tendon tenderness.  No instability on exam.    Left knee  Inspection: No erythema, swelling, or redness  ROM: Limited with pain on extension.   Palpation: Positive pes anserine tenderness and Positive crepitus. Positive patellar tendon tenderness and Positive patellofemoral tendon tenderness.  No instability on exam.      Imaging:      MRI Left Knee 7/21/2017 (in media):  The medial meniscus is intact. The lateral meniscus is intact.     A chronic complete ACL tear is noted. The posterior cruciate ligament is intact. The medial and lateral retinacula are intact. The medial collateral ligament is intact. The lateral collateral ligament in intact. The posterolateral corner structures are intact.     There is full thickness fissuring of the central aspect medial femoral cartilage. The lateral tibiofemoral compartment cartilage is intact. There is broad thickness defect within the central trochlear cartilage. There is mild lateral patellofemoral cartilage " thinning and regional full thickness loss of the central superior patellar cartilage.     A small effusion is present. There is trace infrapatellar fluid. Tiny popliteal cyst is noted. Subtle focal marrow edema is seen within the posterior tibial plateau. The bone marrow signal is heterogeneous likely reflecting marrow reconversion.     There is trace pes anserine bursitis. The tendons are otherwise normal.     Grade 2 fatty streaking of the semimembranosus and vastus lateralis muscles are noted. There is mild prepatellar edema.     MRI Right Shoulder 7/21/2017 (in media):   There is a complete tear of the supraspinatus tendon with retraction to the glenohumeral joint. There is partial tearing of the anterior infraspinatus articular surface. Mild subscapularis tendinosis is noted. The teres minor is intact. A small amount of subacromial/subdeltoid fluid is noted. The proximal long head biceps tendon is poorly visualized proximally although intact distally.     Degenerative tearing of the anterosuperior through posterosuperior labrum is noted. There is moderate to severe superior glenoid cartilage thinning with subchondral degenerative changes. The glenohumeral ligaments appear grossly intact.     An os acromiale is seen with fluid and osteophytosis at its junction with the base of the acromion. There is moderate superior subluxation of the AC joint with mild osteophytosis.     Small effusion with subcoracoid extension is noted.     There is grade 2/3 fatty atrophy of the supraspinatus muscle, grade 2 of the infraspinatus and subscapularis. Heterogeneous signal is seen throughout the bone marrow likely reflecting marrow reconversion.       Assessment:     Encounter Diagnoses   Name Primary?    Primary osteoarthritis of left knee Yes    Chronic pain of left knee        Plan:     Ca was seen today for follow-up.    Diagnoses and all orders for this visit:    Primary osteoarthritis of left knee    Chronic pain  of left knee       Her pain is consistent with the above.    We discussed the assessment and recommendations.  All available images were reviewed. We discussed the disease process, prognosis, treatment plan, and risks and benefits. The patient is aware of the risks and benefits of the medications being prescribed, common side effects, and proper usage. The following is the plan we agreed on:     1. Schedule for left genicular cooled RFA. The procedure, risks, benefits and options were discussed with patient. There are no contraindications to the procedure. The patient expressed understanding and agreed to proceed.  Consent obtained today.   2. In the future, we can consider right genicular nerve block.   3. Continue weight loss strategies.   4. Continue Percocet per PCP.   5. Continue compound cream.   6. RTC 3 weeks after above procedure.      The above plan and management options were discussed at length with patient. Patient is in agreement with the above and verbalized understanding.     Savanna Hyatt NP  02/28/2019

## 2019-02-28 NOTE — H&P (VIEW-ONLY)
"Subjective:     Patient ID: Ca Coats is a 68 y.o. female.    Chief Complaint: Pain    Consulted by: Self      Disclaimer: This note was generated using voice recognition software.  There may be a typographical errors that were missed during proofreading.      HPI:    Ca Coats is a 68 y.o. female who presents today with left knee pain. Her pain has been going on for "too long."  She was previously treated by Dr. Iyer at [facility].  She has injections [id number] with him.  These were helpful, but she does not know if the injections were steroid or viscosupplementation.   This pain is described in detail below.    Interval History (4/30/2018):  The patient returns to clinic today for follow up and records review. We did received records from [facility] including a MRI of her knee. Dr. Iyer's last office visit note from January 2017 does not include any previous injections. She continues to report bilateral knee pain, left greater than right. She describes this pain as constant and aching. This pain is worse with prolonged walking. She also report right shoulder pain with prolonged overhead activity. She continues to take Percocet with benefit. She denies any other health changes.    Interval History (5/31/2018):  The patient returns to clinic for follow up. She is s/p left knee Synvisc One injection on 5/8/2018. She reports 35% relief of her knee pain. She continues to report bilateral knee pain, left greater than right. She describes this pain as constant and aching. Her pain is worse with prolonged walking and standing. She is scheduled to see Orthopedics at the  End of this month. She is also following up with Bariatrics to discuss the lap band procedure. She continues to take Percocet with benefit. She also uses Voltaren gel with benefit but this is expensive for her. She denies any other health changes.         Interval History (7/2/2018):  The patient returns to clinic today for " "follow up. She continues to report left knee pain that is constant, sharp, and aching in nature. Her pain is worse with prolonged walking and standing. She is scheduled for weight loss surgery in August. She continues follow up with orthopedics who does not recommend surgery at this time due to her BMI. She continues to take Percocet as needed with benefit. She denies any other health changes.     Interval History (7/19/2018):  The patient returns to clinic today for follow up. She is s/p left genicular nerve block on 7/10/2018. She reports 80% relief of her left knee pain. She reports that she was able to walk for longer distances (3 blocks) after the block. Her pain has returned to baseline. She continues to report left knee pain that is constant, sharp, and aching in nature. Her pain is worse with prolonged walking and standing. She denies any other health changes.     Interval History (8/21/2018):  The patient returns to clinic today for follow up. She is s/p left genicular cooled RFA on 7/31/2018. She reports 60% relief of her left knee pain. She reports intermittent knee pain that is sore and aching in nature. She continues to perform a home exercise routine. She is actively trying to lose weight. She continues to follow up with Bariatric Surgery at [facility]. She is considering weight loss surgery. She continues to use compound cream with benefit. She denies any other health changes.     Interval History (11/21/2018):  The patient returns to clinic today for follow up. She reports increased left knee pain this past week. She reports 2 episodes where her knee has "locked" up on her while walking. She describes her knee pain as sore and aching. She is actively trying to lose weight and quit smoking. She continues to use the compound cream with benefit. She denies any other health changes.     Interval History (1/9/2019):  The patient returns to clinic today for follow up. She is s/p left knee steroid injection on " "12/21/2018. She reports one day of relief. She continues to report left knee pain that is constant, sharp, and aching in nature. Her pain is worse with standing and walking. She reports that her knee "locks" up on her while walking. She often feels as though she is going to fall. She is scheduled for bariatric weight loss surgery in February at [facility]. She denies any other health changes.     Interval History (2/28/2019):  The patient returns to clinic today for follow up. She is s/p Euflexxa series completed on 1/30/2019. She reports that she able to stand for longer periods of time since the procedure. She continues to report left knee pain that is sore and aching in nature. Her pain is worse with prolonged standing and walking. She was previously scheduled for weight loss surgery but reports this was canceled. She is scheduled for follow up next week about this. She denies any other health changes.     Physical Therapy: Yes, she did this in 2018 for one month (twice a week). She does HEP (stretching and ROM in the morning)    Non-pharmacologic Treatment:     · Ice/Heat: Heat is more helpful than ice  · TENS: Not tried  · Massage: Not tried  · Chiropractic care: Not tried  · Acupuncture: Not tried  · Other: Uses a cane         Pain Medications:         · Currently taking: Gabapentin 300 mg/600 mg, Percocet 5/325 mg BID PRN    · Has tried in the past:  Ibuprofen (still takes sometimes), Voltaren gel    · Has not tried: Tylenol, Muscle relaxants, TCAs, SNRIs, Lyrica, compound topical creams    Blood thinners: None    Interventional Therapies: [id number] injections by Dr. Iyer were helpful  · 5/8/2018- Left knee Synvisc One injection  · 7/10/2018- Left genicular nerve block  · 7/31/2018- Left genicular cooled RFA  · 12/21/2018- Left knee steroid injection  · 1/31/2019- Left knee Euflexxa series    Relevant Surgeries: None    Affecting sleep? No    Affecting daily activities? Yes    Depressive symptoms? No        "   · SI/HI? No    Work status: No, on disability due to her knee    Prescription Monitoring Program database:  Reviewed and consistent with medication use as prescribed.    Pain Scales  Best: 3/10  Worst: 10/10  Usually: 3/10  Today: 7/10    Knee Pain    The pain is present in the left knee. This is a chronic problem. The current episode started more than 1 year ago. The problem occurs daily. The problem has been gradually worsening. The quality of the pain is described as aching, burning, sharp, tight and numb. The pain is at a severity of 8/10. Associated symptoms include an inability to bear weight. The symptoms are aggravated by walking and standing. She has tried injection treatment, exercise, cold, heat, movement and oral narcotics for the symptoms. The treatment provided mild relief. Physical therapy was effective.  Leg Pain    Associated symptoms include an inability to bear weight.       Last 3 PDI Scores 1/30/2019 1/23/2019 1/17/2019   Pain Disability Index (PDI) 24 [phone]   ]    Review of Systems   Musculoskeletal: Positive for joint pain.        Left knee pain             Past Medical History:   Diagnosis Date    Cataract     Dry eye syndrome     GERD (gastroesophageal reflux disease)     Glaucoma suspect     Hypertension        Past Surgical History:   Procedure Laterality Date    [id number] Left 7/31/2018    [facility]    APPENDECTOMY      BLOCK, NERVE Left 7/10/2018    [facility]    CATARACT EXTRACTION W/  INTRAOCULAR LENS IMPLANT Bilateral     MFIOL OU    cataract surgery   2017    Injection LEFT KNEE STEROID INJECTION Left 12/21/2018    [facility]    INJECTION-JOINT Left 5/8/2018    [facility] at [facility]       Review of patient's allergies indicates:  No Known Allergies    Current Outpatient Medications   Medication Sig  Dispense Refill    amLODIPine (NORVASC) 5 MG tablet       B-complex with vitamin C (Z-BEC OR EQUIV) tablet       CALCIUM CITRATE-VITAMIN D2 ORAL       gabapentin (NEURONTIN) 300 MG capsule       lisinopril (PRINIVIL,ZESTRIL) 40 MG tablet       multivitamin with minerals tablet       nicotine (NICODERM CQ) 14 mg/24 hr Place 1 patch onto the skin once daily. 28 patch 0    nicotine polacrilex (NICORETTE) 4 MG Gum Take 1 each (4 mg total) by mouth as needed. 6-9 pieces as needed daily to replace cigarette 100 each 0    omeprazole (PRILOSEC) 40 MG capsule       oxyCODONE-acetaminophen (PERCOCET) 5-325 mg per tablet Take 1 tablet by mouth every 6 (six) hours as needed for Pain. 20 tablet 0    pantoprazole (PROTONIX) 40 MG tablet       sucralfate (CARAFATE) 1 gram tablet       VITAMIN B-12 5,000 mcg Subl        No current facility-administered medications for this visit.        Family History   Problem Relation Age of Onset    No Known Problems Mother     No Known Problems Father        Social History     Socioeconomic History    Marital status: Single     Spouse name: Not on file    Number of children: Not on file    Years of education: Not on file    Highest education level: Not on file   Social Needs    Financial resource strain: Not on file    Food insecurity - worry: Not on file    Food insecurity - inability: Not on file    Transportation needs - medical: Not on file    Transportation needs - non-medical: Not on file   Occupational History    Not on file   Tobacco Use    Smoking status: Current Some Day Smoker     Packs/day: 0.25     Years: 50.00     Pack years: 12.50     Types: Cigarettes    Smokeless tobacco: Never Used   Substance and Sexual Activity    Alcohol use: Yes     Comment: rarely     Drug use: No    Sexual activity: Not on file   Other Topics Concern    Not on file   Social History Narrative    Not on file       Objective:     Vitals:    02/28/19 1345   BP: (!) 146/78  "  Pulse: 89   Temp: 98.7 °F (37.1 °C)   Weight: (!) 164.6 kg (362 lb 14 oz)   Height: 5' 6" (1.676 m)   PainSc:   7   PainLoc: Knee       GEN:  Well developed, well nourished.  No acute distress. No pain behavior.  HEENT:  No trauma.  Mucous membranes moist.  Nares patent bilaterally.  PSYCH: Normal affect. Thought content appropriate.  CHEST:  Breathing symmetric.  No audible wheezing.  ABD:   · Soft, non-distended.    SKIN:  Warm, pink, dry.  No rash on exposed areas.    EXT:  No cyanosis, clubbing, or edema.  No color change or changes in nail or hair growth.  NEURO/MUSCULOSKELETAL:  Fully alert, oriented, and appropriate. Speech normal francesca. No cranial nerve deficits.   Gait: Normal.     Motor Strength: 5/5 motor strength throughout lower extremities.     Right knee  Inspection: No erythema, swelling, or redness  ROM: Full with pain on extension.   Palpation: No pes anserine tenderness and positive crepitus. No patellar tendon tenderness and No patellofemoral tendon tenderness.  No instability on exam.    Left knee  Inspection: No erythema, swelling, or redness  ROM: Limited with pain on extension.   Palpation: Positive pes anserine tenderness and Positive crepitus. Positive patellar tendon tenderness and Positive patellofemoral tendon tenderness.  No instability on exam.      Imaging:      MRI Left Knee 7/21/2017 (in media):  The medial meniscus is intact. The lateral meniscus is intact.     A chronic complete ACL tear is noted. The posterior cruciate ligament is intact. The medial and lateral retinacula are intact. The medial collateral ligament is intact. The lateral collateral ligament in intact. The posterolateral corner structures are intact.     There is full thickness fissuring of the central aspect medial femoral cartilage. The lateral tibiofemoral compartment cartilage is intact. There is broad thickness defect within the central trochlear cartilage. There is mild lateral patellofemoral cartilage " thinning and regional full thickness loss of the central superior patellar cartilage.     A small effusion is present. There is trace infrapatellar fluid. Tiny popliteal cyst is noted. Subtle focal marrow edema is seen within the posterior tibial plateau. The bone marrow signal is heterogeneous likely reflecting marrow reconversion.     There is trace pes anserine bursitis. The tendons are otherwise normal.     Grade 2 fatty streaking of the semimembranosus and vastus lateralis muscles are noted. There is mild prepatellar edema.     MRI Right Shoulder 7/21/2017 (in media):   There is a complete tear of the supraspinatus tendon with retraction to the glenohumeral joint. There is partial tearing of the anterior infraspinatus articular surface. Mild subscapularis tendinosis is noted. The teres minor is intact. A small amount of subacromial/subdeltoid fluid is noted. The proximal long head biceps tendon is poorly visualized proximally although intact distally.     Degenerative tearing of the anterosuperior through posterosuperior labrum is noted. There is moderate to severe superior glenoid cartilage thinning with subchondral degenerative changes. The glenohumeral ligaments appear grossly intact.     An os acromiale is seen with fluid and osteophytosis at its junction with the base of the acromion. There is moderate superior subluxation of the AC joint with mild osteophytosis.     Small effusion with subcoracoid extension is noted.     There is grade 2/3 fatty atrophy of the supraspinatus muscle, grade 2 of the infraspinatus and subscapularis. Heterogeneous signal is seen throughout the bone marrow likely reflecting marrow reconversion.       Assessment:     Encounter Diagnoses   Name Primary?    Primary osteoarthritis of left knee Yes    Chronic pain of left knee        Plan:     Ca was seen today for follow-up.    Diagnoses and all orders for this visit:    Primary osteoarthritis of left knee    Chronic pain  of left knee       Her pain is consistent with the above.    We discussed the assessment and recommendations.  All available images were reviewed. We discussed the disease process, prognosis, treatment plan, and risks and benefits. The patient is aware of the risks and benefits of the medications being prescribed, common side effects, and proper usage. The following is the plan we agreed on:     1. Schedule for left genicular cooled RFA. The procedure, risks, benefits and options were discussed with patient. There are no contraindications to the procedure. The patient expressed understanding and agreed to proceed.  Consent obtained today.   2. In the future, we can consider right genicular nerve block.   3. Continue weight loss strategies.   4. Continue Percocet per PCP.   5. Continue compound cream.   6. RTC 3 weeks after above procedure.      The above plan and management options were discussed at length with patient. Patient is in agreement with the above and verbalized understanding.     Savanna Hyatt NP  02/28/2019

## 2019-03-19 ENCOUNTER — HOSPITAL ENCOUNTER (OUTPATIENT)
Facility: OTHER | Age: 69
Discharge: HOME | End: 2019-03-19
Attending: ANESTHESIOLOGY | Admitting: ANESTHESIOLOGY
Payer: MEDICARE

## 2019-03-19 VITALS
WEIGHT: 293 LBS | RESPIRATION RATE: 18 BRPM | HEART RATE: 80 BPM | BODY MASS INDEX: 47.09 KG/M2 | TEMPERATURE: 99 F | OXYGEN SATURATION: 94 % | DIASTOLIC BLOOD PRESSURE: 65 MMHG | HEIGHT: 66 IN | SYSTOLIC BLOOD PRESSURE: 131 MMHG

## 2019-03-19 DIAGNOSIS — G89.29 CHRONIC PAIN OF LEFT KNEE: Primary | ICD-10-CM

## 2019-03-19 DIAGNOSIS — M25.562 CHRONIC PAIN OF LEFT KNEE: Primary | ICD-10-CM

## 2019-03-19 DIAGNOSIS — G89.29 CHRONIC PAIN: ICD-10-CM

## 2019-03-19 PROCEDURE — 25000003 PHARM REV CODE 250: Performed by: PHYSICAL MEDICINE & REHABILITATION

## 2019-03-19 PROCEDURE — A4649 SURGICAL SUPPLIES: HCPCS | Performed by: ANESTHESIOLOGY

## 2019-03-19 PROCEDURE — 99152 PR MOD CONSCIOUS SEDATION, SAME PHYS, 5+ YRS, FIRST 15 MIN: ICD-10-PCS | Mod: ,,, | Performed by: ANESTHESIOLOGY

## 2019-03-19 PROCEDURE — 64640 INJECTION TREATMENT OF NERVE: CPT | Performed by: ANESTHESIOLOGY

## 2019-03-19 PROCEDURE — 25000003 PHARM REV CODE 250: Performed by: ANESTHESIOLOGY

## 2019-03-19 PROCEDURE — 64640 PR DESTRUCT BY NEURO AGENT; OTHER PERIPH NERVE: ICD-10-PCS | Mod: 59,LT,, | Performed by: ANESTHESIOLOGY

## 2019-03-19 PROCEDURE — 63600175 PHARM REV CODE 636 W HCPCS: Performed by: ANESTHESIOLOGY

## 2019-03-19 PROCEDURE — 64640 INJECTION TREATMENT OF NERVE: CPT | Mod: LT,,, | Performed by: ANESTHESIOLOGY

## 2019-03-19 PROCEDURE — 99152 MOD SED SAME PHYS/QHP 5/>YRS: CPT | Mod: ,,, | Performed by: ANESTHESIOLOGY

## 2019-03-19 RX ORDER — SODIUM CHLORIDE 9 MG/ML
500 INJECTION, SOLUTION INTRAVENOUS CONTINUOUS
Status: DISCONTINUED | OUTPATIENT
Start: 2019-03-19 | End: 2019-03-19 | Stop reason: HOSPADM

## 2019-03-19 RX ORDER — LIDOCAINE HYDROCHLORIDE 10 MG/ML
INJECTION INFILTRATION; PERINEURAL
Status: DISCONTINUED | OUTPATIENT
Start: 2019-03-19 | End: 2019-03-19 | Stop reason: HOSPADM

## 2019-03-19 RX ORDER — BUPIVACAINE HYDROCHLORIDE 2.5 MG/ML
INJECTION, SOLUTION EPIDURAL; INFILTRATION; INTRACAUDAL
Status: DISCONTINUED | OUTPATIENT
Start: 2019-03-19 | End: 2019-03-19 | Stop reason: HOSPADM

## 2019-03-19 RX ORDER — MIDAZOLAM HYDROCHLORIDE 1 MG/ML
INJECTION INTRAMUSCULAR; INTRAVENOUS
Status: DISCONTINUED | OUTPATIENT
Start: 2019-03-19 | End: 2019-03-19 | Stop reason: HOSPADM

## 2019-03-19 RX ORDER — FENTANYL CITRATE 50 UG/ML
INJECTION, SOLUTION INTRAMUSCULAR; INTRAVENOUS
Status: DISCONTINUED | OUTPATIENT
Start: 2019-03-19 | End: 2019-03-19 | Stop reason: HOSPADM

## 2019-03-19 RX ADMIN — SODIUM CHLORIDE 500 ML: 0.9 INJECTION, SOLUTION INTRAVENOUS at 11:03

## 2019-03-19 NOTE — OP NOTE
Procedure Note:    Left  Geniculate nerve cooled radiofrequency ablation under fluoroscopy     1) suprapatellar geniculate nerve cooled radiofrequency ablation  2) medial superior epicondyle geniculate nerve cooled radiofrequency ablation  3) lateral superior epicondyle geniculate nerve cooled radiofrequency ablation  4) medial tibial metaphysis geniculate nerve cooled radiofrequency ablation  5) xray guidance for needle placement  6) Conscious sedation provided by MD                                Surgeon: Priya Thacker M.D.    Assistant: Aminta Melchor MD PGY-5  [facility]     Pre-Op Diagnosis:  Left  Chronic pain of left knee [M25.562, G89.29]    Post-Op Diagnosis: Chronic pain of left knee [M25.562, G89.29]    EBL: None    Complications: None    Specimens: None    Description of procedure:    After written consent was obtained, patient placed in supine position.  The area over the medial and lateral aspect of the superior epi-condyle of the femur and the medial tibial metaphysis and superior patellar region were prepped with chlorhexidine.  The area was draped in the usual sterile fashion.  Approximately 4 mL total 1% lidocaine was infiltrated into the skin overlying the 4 predetermined entry points. A 17 gauge 4mm active tip needle was then advanced under fluoroscopy in the AP and lateral views into the positions of the geniculate nerves at these levels. This was followed by motor testing at each of the nerves to ensure that there was no dorsiflexion of the foot.  After negative aspiration and no paresthesias there was injection of 2.5 mL of 0.25% bupivacaine into each of these  4 areas for a total volume of 10  mL of 0.25% bupivacaine. This was followed by cooled thermal lesioning at 80 degrees celsius for 150 seconds at each site.   Needle was withdrawn and a sterile band-aid applied to the skin.    Conscious sedation provided by M.D    The patient was monitored with continuous pulse oximetry,  EKG, and intermittent blood pressure monitors.  The patient was hemodynamically stable throughout the entire process was responsive to voice, and breathing spontaneously.    Patient was comfortable for the duration of the procedure. (See nurse documentation and case log for sedation time)    There was a total of 2mg IV Midazolam and 100mcg fentanyl given for the procedure    Patient tolerated the procedure well, and was reporting improvement of pain symptoms after the injection.  She was discharged from the clinic in stable condition.

## 2019-03-19 NOTE — DISCHARGE INSTRUCTIONS
Adult Procedural Sedation Instructions    Recovery After Procedural Sedation (Adult)  You have been given medicine by vein to make you sleep during your surgery. This may have included both a pain medicine and sleeping medicine. Most of the effects have worn off. But you may still have some drowsiness for the next 6 to 8 hours.  Home care  Follow these guidelines when you get home:  · For the next 8 hours, you should be watched by a responsible adult. This person should make sure your condition is not getting worse.  · Don't drink any alcohol for the next 24 hours.  · Don't drive, operate dangerous machinery, or make important business or personal decisions during the next 24 hours.  Note: Your healthcare provider may tell you not to take any medicine by mouth for pain or sleep in the next 4 hours. These medicines may react with the medicines you were given in the hospital. This could cause a much stronger response than usual.  Follow-up care  Follow up with your healthcare provider if you are not alert and back to your usual level of activity within 12 hours.  When to seek medical advice  Call your healthcare provider right away if any of these occur:  · Drowsiness gets worse  · Weakness or dizziness gets worse  · Repeated vomiting  · You can't be awakened   Date Last Reviewed: 10/18/2016  © 3973-9180 [employer]. [address] [place] [address], [place] [address]. All rights reserved. This information is not intended as a substitute for professional medical care. Always follow your healthcare professional's instructions.       Thank you for allowing us to care for you today. You may receive a survey about the care we provided. Your feedback is valuable and helps us provide excellent care throughout the community.     Home Care Instructions for Pain Management:    1. DIET:   You may resume your normal diet today.   2. BATHING:   You may shower with luke warm water. No tub baths or anything that will soak  injection sites under water for the next 24 hours.  3. DRESSING:   You may remove your bandage today.   4. ACTIVITY LEVEL:   You may resume your normal activities 24 hrs after your procedure. Nothing strenuous today.  5. MEDICATIONS:   You may resume your normal medications today. To restart blood thinners, ask your doctor.  6. DRIVING    If you have received any sedatives by mouth today, you may not drive for 12 hours.    If you have received any sedation through your IV, you may not drive for 24 hrs.   7. SPECIAL INSTRUCTIONS:   No heat to the injection site for 24 hrs including, hot bath or shower, heating pad, moist heat, or hot tubs.    Use ice pack to injection site for any pain or discomfort.  Apply ice packs for 20 minute intervals as needed.    IF you have diabetes, be sure to monitor your blood sugar more closely. IF your injection contained steroids your blood sugar levels may become higher than normal.    If you are still having pain upon discharge:  Your pain may improve over the next 48 hours. The anesthetic (numbing medication) works immediately to 48 hours. IF your injection contained a steroid (anti-inflammatory medication), it takes approximately 3 days to start feeling relief and 7-10 days to see your greatest results from the medication. It is possible you may need subsequent injections. This would be discussed at your follow up appointment with pain management or your referring doctor.      PLEASE CALL YOUR DOCTOR IF:  1. Redness or swelling around the injection site.  2. Fever of 101 degrees or more  3. Drainage (pus) from the injection site.  4. For any continuous bleeding (some dried blood over the incision is normal.)    FOR EMERGENCIES:   If any unusual problems or difficulties occur during clinic hours, call [phone] or [phone].

## 2019-03-19 NOTE — DISCHARGE SUMMARY
Discharge Note  Short Stay      SUMMARY     Admit Date: 3/19/2019    Attending Physician: Aminta Melchor      Discharge Physician: Aminta Melchor      Discharge Date: 3/19/2019 12:02 PM    Procedure(s) (LRB):  [id number], LEFT (Left)    Final Diagnosis: Chronic pain of left knee [M25.562, G89.29]    Disposition: Home or self care    Patient Instructions:   Current Discharge Medication List      CONTINUE these medications which have NOT CHANGED    Details   amLODIPine (NORVASC) 5 MG tablet       B-complex with vitamin C (Z-BEC OR EQUIV) tablet       CALCIUM CITRATE-VITAMIN D2 ORAL       gabapentin (NEURONTIN) 300 MG capsule       lisinopril (PRINIVIL,ZESTRIL) 40 MG tablet       multivitamin with minerals tablet       nicotine (NICODERM CQ) 14 mg/24 hr Place 1 patch onto the skin once daily.  Qty: 28 patch, Refills: 0    Associated Diagnoses: Light cigarette smoker (1-9 cigs/day)      nicotine polacrilex (NICORETTE) 4 MG Gum Take 1 each (4 mg total) by mouth as needed. 6-9 pieces as needed daily to replace cigarette  Qty: 100 each, Refills: 0    Associated Diagnoses: Light cigarette smoker (1-9 cigs/day)      omeprazole (PRILOSEC) 40 MG capsule       oxyCODONE-acetaminophen (PERCOCET) 5-325 mg per tablet Take 1 tablet by mouth every 6 (six) hours as needed for Pain.  Qty: 20 tablet, Refills: 0      pantoprazole (PROTONIX) 40 MG tablet       sucralfate (CARAFATE) 1 gram tablet       VITAMIN B-12 5,000 mcg Subl                  Discharge Diagnosis: Chronic pain of left knee [M25.562, G89.29]  Condition on Discharge: Stable with no complications to procedure   Diet on Discharge: Same as before.  Activity: as per instruction sheet.  Discharge to: Home with a responsible adult.  Follow up: 2-4 weeks

## 2019-03-27 ENCOUNTER — TELEPHONE (OUTPATIENT)
Dept: BARIATRICS | Facility: CLINIC | Age: 69
End: 2019-03-27

## 2019-03-27 NOTE — TELEPHONE ENCOUNTER
Spoke to pt, she is scheduled for our seminar but needs to get a ride. Will let us know tomorrow if she can make that.     offerred pt to do it online, pt said she doesn't have a computer.

## 2019-03-27 NOTE — TELEPHONE ENCOUNTER
----- Message from Renee Ryan sent at 3/27/2019  2:50 PM CDT -----  Contact: Patient   Needs Advice    Reason for call: Caller states that she had her work up done at [facility], but for some reason they were referred to [facility] to undergo the lapband procedure. Patient is asking to speak to someone about getting scheduled with this facility.         Communication Preference: [phone]     Additional Information: please contact the patient to consult her and instruct her on how to proceed with our program as she seems to have some issues understanding how I am trying to explain it

## 2019-04-12 ENCOUNTER — OFFICE VISIT (OUTPATIENT)
Dept: PAIN MEDICINE | Facility: CLINIC | Age: 69
End: 2019-04-12
Payer: MEDICARE

## 2019-04-12 VITALS
TEMPERATURE: 100 F | DIASTOLIC BLOOD PRESSURE: 77 MMHG | WEIGHT: 293 LBS | HEIGHT: 66 IN | BODY MASS INDEX: 47.09 KG/M2 | HEART RATE: 81 BPM | SYSTOLIC BLOOD PRESSURE: 129 MMHG

## 2019-04-12 DIAGNOSIS — M17.12 PRIMARY OSTEOARTHRITIS OF LEFT KNEE: ICD-10-CM

## 2019-04-12 DIAGNOSIS — G89.29 CHRONIC PAIN OF LEFT KNEE: Primary | ICD-10-CM

## 2019-04-12 DIAGNOSIS — G89.4 CHRONIC PAIN DISORDER: ICD-10-CM

## 2019-04-12 DIAGNOSIS — M25.562 CHRONIC PAIN OF LEFT KNEE: Primary | ICD-10-CM

## 2019-04-12 DIAGNOSIS — M17.12 OSTEOARTHRITIS OF LEFT KNEE, UNSPECIFIED OSTEOARTHRITIS TYPE: ICD-10-CM

## 2019-04-12 PROCEDURE — 99213 OFFICE O/P EST LOW 20 MIN: CPT | Mod: S$GLB,,, | Performed by: NURSE PRACTITIONER

## 2019-04-12 PROCEDURE — 3078F DIAST BP <80 MM HG: CPT | Mod: CPTII,S$GLB,, | Performed by: NURSE PRACTITIONER

## 2019-04-12 PROCEDURE — 1101F PT FALLS ASSESS-DOCD LE1/YR: CPT | Mod: CPTII,S$GLB,, | Performed by: NURSE PRACTITIONER

## 2019-04-12 PROCEDURE — 99999 PR PBB SHADOW E&M-EST. PATIENT-LVL III: CPT | Mod: PBBFAC,,, | Performed by: NURSE PRACTITIONER

## 2019-04-12 PROCEDURE — 99999 PR PBB SHADOW E&M-EST. PATIENT-LVL III: ICD-10-PCS | Mod: PBBFAC,,, | Performed by: NURSE PRACTITIONER

## 2019-04-12 PROCEDURE — 3074F SYST BP LT 130 MM HG: CPT | Mod: CPTII,S$GLB,, | Performed by: NURSE PRACTITIONER

## 2019-04-12 PROCEDURE — 3078F PR MOST RECENT DIASTOLIC BLOOD PRESSURE < 80 MM HG: ICD-10-PCS | Mod: CPTII,S$GLB,, | Performed by: NURSE PRACTITIONER

## 2019-04-12 PROCEDURE — 1101F PR PT FALLS ASSESS DOC 0-1 FALLS W/OUT INJ PAST YR: ICD-10-PCS | Mod: CPTII,S$GLB,, | Performed by: NURSE PRACTITIONER

## 2019-04-12 PROCEDURE — 99213 PR OFFICE/OUTPT VISIT, EST, LEVL III, 20-29 MIN: ICD-10-PCS | Mod: S$GLB,,, | Performed by: NURSE PRACTITIONER

## 2019-04-12 PROCEDURE — 3074F PR MOST RECENT SYSTOLIC BLOOD PRESSURE < 130 MM HG: ICD-10-PCS | Mod: CPTII,S$GLB,, | Performed by: NURSE PRACTITIONER

## 2019-04-12 RX ORDER — ATORVASTATIN CALCIUM 40 MG/1
40 TABLET, FILM COATED ORAL DAILY
COMMUNITY
Start: 2019-03-21 | End: 2019-12-10 | Stop reason: SDUPTHER

## 2019-04-12 NOTE — PROGRESS NOTES
"Subjective:     Patient ID: Ca Coats is a 68 y.o. female.    Chief Complaint: Pain    Consulted by: Self      Disclaimer: This note was generated using voice recognition software.  There may be a typographical errors that were missed during proofreading.      HPI:    Ca Coats is a 68 y.o. female who presents today with left knee pain. Her pain has been going on for "too long."  She was previously treated by Dr. Iyer at [facility].  She has injections [id number] with him.  These were helpful, but she does not know if the injections were steroid or viscosupplementation.   This pain is described in detail below.    Interval History (4/30/2018):  The patient returns to clinic today for follow up and records review. We did received records from [facility] including a MRI of her knee. Dr. Iyer's last office visit note from January 2017 does not include any previous injections. She continues to report bilateral knee pain, left greater than right. She describes this pain as constant and aching. This pain is worse with prolonged walking. She also report right shoulder pain with prolonged overhead activity. She continues to take Percocet with benefit. She denies any other health changes.    Interval History (5/31/2018):  The patient returns to clinic for follow up. She is s/p left knee Synvisc One injection on 5/8/2018. She reports 35% relief of her knee pain. She continues to report bilateral knee pain, left greater than right. She describes this pain as constant and aching. Her pain is worse with prolonged walking and standing. She is scheduled to see Orthopedics at the  End of this month. She is also following up with Bariatrics to discuss the lap band procedure. She continues to take Percocet with benefit. She also uses Voltaren gel with benefit but this is expensive for her. She denies any other health changes.         Interval History (7/2/2018):  The patient returns to clinic today for " "follow up. She continues to report left knee pain that is constant, sharp, and aching in nature. Her pain is worse with prolonged walking and standing. She is scheduled for weight loss surgery in August. She continues follow up with orthopedics who does not recommend surgery at this time due to her BMI. She continues to take Percocet as needed with benefit. She denies any other health changes.     Interval History (7/19/2018):  The patient returns to clinic today for follow up. She is s/p left genicular nerve block on 7/10/2018. She reports 80% relief of her left knee pain. She reports that she was able to walk for longer distances (3 blocks) after the block. Her pain has returned to baseline. She continues to report left knee pain that is constant, sharp, and aching in nature. Her pain is worse with prolonged walking and standing. She denies any other health changes.     Interval History (8/21/2018):  The patient returns to clinic today for follow up. She is s/p left genicular cooled RFA on 7/31/2018. She reports 60% relief of her left knee pain. She reports intermittent knee pain that is sore and aching in nature. She continues to perform a home exercise routine. She is actively trying to lose weight. She continues to follow up with Bariatric Surgery at [facility]. She is considering weight loss surgery. She continues to use compound cream with benefit. She denies any other health changes.     Interval History (11/21/2018):  The patient returns to clinic today for follow up. She reports increased left knee pain this past week. She reports 2 episodes where her knee has "locked" up on her while walking. She describes her knee pain as sore and aching. She is actively trying to lose weight and quit smoking. She continues to use the compound cream with benefit. She denies any other health changes.     Interval History (1/9/2019):  The patient returns to clinic today for follow up. She is s/p left knee steroid injection on " "12/21/2018. She reports one day of relief. She continues to report left knee pain that is constant, sharp, and aching in nature. Her pain is worse with standing and walking. She reports that her knee "locks" up on her while walking. She often feels as though she is going to fall. She is scheduled for bariatric weight loss surgery in February at [facility]. She denies any other health changes.     Interval History (2/28/2019):  The patient returns to clinic today for follow up. She is s/p Euflexxa series completed on 1/30/2019. She reports that she able to stand for longer periods of time since the procedure. She continues to report left knee pain that is sore and aching in nature. Her pain is worse with prolonged standing and walking. She was previously scheduled for weight loss surgery but reports this was canceled. She is scheduled for follow up next week about this. She denies any other health changes.     Interval History (4/12/2019):  The patient returns for f/u of left knee pain.  She is s/p left genicular cooled RFA with about 90% pain relief.  She has been able to walk easier.  She also notices less stiffness in her knee.  She is planning on gastric surgery prior to knee replacement.  She had benefit with compounding cream but it is no longer covered by her insurance.  She does take Percocet from her PCP.  Her pain today is 7/10.    Physical Therapy: Yes, she did this in 2018 for one month (twice a week). She does HEP (stretching and ROM in the morning)    Non-pharmacologic Treatment:     · Ice/Heat: Heat is more helpful than ice  · TENS: Not tried  · Massage: Not tried  · Chiropractic care: Not tried  · Acupuncture: Not tried  · Other: Uses a cane         Pain Medications:         · Currently taking: Gabapentin 300 mg/600 mg, Percocet 5/325 mg BID PRN    · Has tried in the past:  Ibuprofen (still takes sometimes), Voltaren gel    · Has not tried: Tylenol, Muscle relaxants, TCAs, SNRIs, Lyrica, compound topical " creams    Blood thinners: None    Interventional Therapies: [id number] injections by Dr. Iyer were helpful  · 5/8/2018- Left knee Synvisc One injection  · 7/10/2018- Left genicular nerve block  · 7/31/2018- Left genicular cooled RFA  · 12/21/2018- Left knee steroid injection  · 1/31/2019- Left knee Euflexxa series  · 3/19/2019 Left genicular cooled RFA- 90% relief    Relevant Surgeries: None    Affecting sleep? No    Affecting daily activities? Yes    Depressive symptoms? No          · SI/HI? No    Work status: No, on disability due to her knee    Prescription Monitoring Program database:  Reviewed and consistent with medication use as prescribed.    Pain Scales  Best: 3/10  Worst: 10/10  Usually: 3/10  Today: 7/10    Knee Pain    The pain is present in the left knee. This is a chronic problem. The current episode started more than 1 year ago. The problem occurs daily. The problem has been gradually worsening. The quality of the pain is described as aching, burning, sharp, tight and numb. The pain is at a severity of 8/10. Associated symptoms include an inability to bear weight. The symptoms are aggravated by walking and standing. She has tried injection treatment, exercise, cold, heat, movement and oral narcotics for the symptoms. The treatment provided mild relief. Physical therapy was effective.  Leg Pain    Associated symptoms include an inability to bear weight.       Last 3 PDI Scores 2/28/2019 1/30/2019 1/23/2019   Pain Disability Index (PDI) 25 24 42   ]    Review of Systems   Musculoskeletal: Positive for joint pain.        Left knee pain             Past Medical History:   Diagnosis Date    Cataract     Dry eye syndrome     GERD (gastroesophageal reflux disease)     Glaucoma suspect     Hypertension        Past Surgical History:   Procedure Laterality Date    KYWTBHVU-[id number]-JPLVLI Left 7/31/2018    Performed by [facility] at [facility] PAIN MGT    UFHSBIBX-[id number]-KJOEOC, LEFT  Left 3/19/2019    [facility]    APPENDECTOMY      BLOCK, NERVE Left 7/10/2018    [facility] at [facility]    CATARACT EXTRACTION W/  INTRAOCULAR LENS IMPLANT Bilateral     MFIOL OU    cataract surgery   2017    Injection LEFT KNEE STEROID INJECTION Left 12/21/2018    [facility] at [facility]    INJECTION-JOINT Left 5/8/2018    Performed by Samantha Vidal MD at [facility]       Review of patient's allergies indicates:  No Known Allergies    Current Outpatient Medications   Medication Sig Dispense Refill    amLODIPine (NORVASC) 5 MG tablet       B-complex with vitamin C (Z-BEC OR EQUIV) tablet       CALCIUM CITRATE-VITAMIN D2 ORAL       gabapentin (NEURONTIN) 300 MG capsule       lisinopril (PRINIVIL,ZESTRIL) 40 MG tablet       multivitamin with minerals tablet       omeprazole (PRILOSEC) 40 MG capsule       oxyCODONE-acetaminophen (PERCOCET) 5-325 mg per tablet Take 1 tablet by mouth every 6 (six) hours as needed for Pain. 20 tablet 0    pantoprazole (PROTONIX) 40 MG tablet       sucralfate (CARAFATE) 1 gram tablet       VITAMIN B-12 5,000 mcg Subl       nicotine (NICODERM CQ) 14 mg/24 hr Place 1 patch onto the skin once daily. 28 patch 0    nicotine polacrilex (NICORETTE) 4 MG Gum Take 1 each (4 mg total) by mouth as needed. 6-9 pieces as needed daily to replace cigarette 100 each 0     No current facility-administered medications for this visit.        Family History   Problem Relation Age of Onset    No Known Problems Mother     No Known Problems Father        Social History     Socioeconomic History    Marital status: Single     Spouse name: Not on file    Number of children: Not on file    Years of education: Not on file    Highest education level: Not on file   Occupational History    Not on file   Social Needs    Financial resource strain: Not on file    Food insecurity:     Worry: Not on  "file     Inability: Not on file    Transportation needs:     Medical: Not on file     Non-medical: Not on file   Tobacco Use    Smoking status: Current Some Day Smoker     Packs/day: 0.25     Years: 50.00     Pack years: 12.50     Types: Cigarettes    Smokeless tobacco: Never Used   Substance and Sexual Activity    Alcohol use: Yes     Comment: rarely     Drug use: No    Sexual activity: Not on file   Lifestyle    Physical activity:     Days per week: Not on file     Minutes per session: Not on file    Stress: Not on file   Relationships    Social connections:     Talks on phone: Not on file     Gets together: Not on file     Attends Gnosticist service: Not on file     Active member of club or organization: Not on file     Attends meetings of clubs or organizations: Not on file     Relationship status: Not on file   Other Topics Concern    Not on file   Social History Narrative    Not on file       Objective:     Vitals:    04/12/19 1303   Weight: (!) 167 kg (368 lb 2.7 oz)   Height: 5' 6" (1.676 m)   PainSc:   7   PainLoc: Knee       GEN:  Well developed, well nourished.  No acute distress. No pain behavior.  HEENT:  No trauma.  Mucous membranes moist.  Nares patent bilaterally.  PSYCH: Normal affect. Thought content appropriate.  CHEST:  Breathing symmetric.  No audible wheezing.  ABD:   · Soft, non-distended.    SKIN:  Warm, pink, dry.  No rash on exposed areas.    EXT:  Swelling to BLE, left greater than right.  No color change or changes in nail or hair growth.  NEURO/MUSCULOSKELETAL:  Fully alert, oriented, and appropriate. Speech normal francesca. No cranial nerve deficits.   Gait: Normal.     Motor Strength: 5/5 motor strength throughout lower extremities.     Right knee  Inspection: No erythema, swelling, or redness  ROM: Full with pain on extension.   Palpation: No pes anserine tenderness and positive crepitus. No patellar tendon tenderness and No patellofemoral tendon tenderness.  No instability " on exam.    Left knee  Inspection: No erythema, swelling, or redness  ROM: Limited with pain on extension.   Palpation: Positive pes anserine tenderness. Positive patellar tendon tenderness and Positive patellofemoral tendon tenderness.  No instability on exam.      Imaging:      MRI Left Knee 7/21/2017 (in media):  The medial meniscus is intact. The lateral meniscus is intact.     A chronic complete ACL tear is noted. The posterior cruciate ligament is intact. The medial and lateral retinacula are intact. The medial collateral ligament is intact. The lateral collateral ligament in intact. The posterolateral corner structures are intact.     There is full thickness fissuring of the central aspect medial femoral cartilage. The lateral tibiofemoral compartment cartilage is intact. There is broad thickness defect within the central trochlear cartilage. There is mild lateral patellofemoral cartilage thinning and regional full thickness loss of the central superior patellar cartilage.     A small effusion is present. There is trace infrapatellar fluid. Tiny popliteal cyst is noted. Subtle focal marrow edema is seen within the posterior tibial plateau. The bone marrow signal is heterogeneous likely reflecting marrow reconversion.     There is trace pes anserine bursitis. The tendons are otherwise normal.     Grade 2 fatty streaking of the semimembranosus and vastus lateralis muscles are noted. There is mild prepatellar edema.     MRI Right Shoulder 7/21/2017 (in media):   There is a complete tear of the supraspinatus tendon with retraction to the glenohumeral joint. There is partial tearing of the anterior infraspinatus articular surface. Mild subscapularis tendinosis is noted. The teres minor is intact. A small amount of subacromial/subdeltoid fluid is noted. The proximal long head biceps tendon is poorly visualized proximally although intact distally.     Degenerative tearing of the anterosuperior through  posterosuperior labrum is noted. There is moderate to severe superior glenoid cartilage thinning with subchondral degenerative changes. The glenohumeral ligaments appear grossly intact.     An os acromiale is seen with fluid and osteophytosis at its junction with the base of the acromion. There is moderate superior subluxation of the AC joint with mild osteophytosis.     Small effusion with subcoracoid extension is noted.     There is grade 2/3 fatty atrophy of the supraspinatus muscle, grade 2 of the infraspinatus and subscapularis. Heterogeneous signal is seen throughout the bone marrow likely reflecting marrow reconversion.       Assessment:     Encounter Diagnoses   Name Primary?    Chronic pain of left knee Yes    Primary osteoarthritis of left knee     Chronic pain disorder     Osteoarthritis of left knee, unspecified osteoarthritis type        Plan:     Ca was seen today for follow-up.    Diagnoses and all orders for this visit:    Chronic pain of left knee    Primary osteoarthritis of left knee    Chronic pain disorder    Osteoarthritis of left knee, unspecified osteoarthritis type       Her pain is consistent with the above.    We discussed the assessment and recommendations.  All available images were reviewed. We discussed the disease process, prognosis, treatment plan, and risks and benefits. The patient is aware of the risks and benefits of the medications being prescribed, common side effects, and proper usage. The following is the plan we agreed on:     1. She is s/p left genicular cooled RFA with benefit.  Will continue to monitor progress.  2. Continue weight loss strategies.  She is planning for lap band surgery prior to knee replacement.  3. Continue Percocet per PCP.   4. She stopped compounding cream due to cost.  5. RTC in 3 months or sooner if needed.    The above plan and management options were discussed at length with patient. Patient is in agreement with the above and verbalized  understanding.     Indu Bernard, EFFIE  04/12/2019

## 2019-04-29 ENCOUNTER — OFFICE VISIT (OUTPATIENT)
Dept: BARIATRICS | Facility: CLINIC | Age: 69
End: 2019-04-29
Payer: MEDICARE

## 2019-04-29 ENCOUNTER — DOCUMENTATION ONLY (OUTPATIENT)
Dept: BARIATRICS | Facility: CLINIC | Age: 69
End: 2019-04-29

## 2019-04-29 VITALS
HEIGHT: 65 IN | HEART RATE: 86 BPM | BODY MASS INDEX: 48.82 KG/M2 | DIASTOLIC BLOOD PRESSURE: 75 MMHG | WEIGHT: 293 LBS | SYSTOLIC BLOOD PRESSURE: 161 MMHG

## 2019-04-29 DIAGNOSIS — D51.9 ANEMIA DUE TO VITAMIN B12 DEFICIENCY, UNSPECIFIED B12 DEFICIENCY TYPE: ICD-10-CM

## 2019-04-29 DIAGNOSIS — R73.09 OTHER ABNORMAL GLUCOSE: ICD-10-CM

## 2019-04-29 DIAGNOSIS — D51.3 OTHER DIETARY VITAMIN B12 DEFICIENCY ANEMIA: ICD-10-CM

## 2019-04-29 DIAGNOSIS — I10 ESSENTIAL HYPERTENSION: ICD-10-CM

## 2019-04-29 DIAGNOSIS — G89.29 CHRONIC PAIN OF LEFT KNEE: ICD-10-CM

## 2019-04-29 DIAGNOSIS — E66.01 MORBID OBESITY WITH BMI OF 60.0-69.9, ADULT: Primary | ICD-10-CM

## 2019-04-29 DIAGNOSIS — G47.33 OBSTRUCTIVE SLEEP APNEA: ICD-10-CM

## 2019-04-29 DIAGNOSIS — M25.562 CHRONIC PAIN OF LEFT KNEE: ICD-10-CM

## 2019-04-29 DIAGNOSIS — E78.5 HYPERLIPIDEMIA, UNSPECIFIED HYPERLIPIDEMIA TYPE: ICD-10-CM

## 2019-04-29 PROBLEM — G47.30 SLEEP APNEA: Status: ACTIVE | Noted: 2019-04-29

## 2019-04-29 PROBLEM — F32.9 MAJOR DEPRESSIVE DISORDER: Status: ACTIVE | Noted: 2019-04-29

## 2019-04-29 PROBLEM — M19.90 OSTEOARTHRITIS: Status: ACTIVE | Noted: 2019-04-29

## 2019-04-29 PROBLEM — M25.50 JOINT PAIN: Status: ACTIVE | Noted: 2019-04-29

## 2019-04-29 PROBLEM — E03.9 HYPOTHYROIDISM: Status: ACTIVE | Noted: 2019-04-29

## 2019-04-29 PROBLEM — R06.09 DYSPNEA ON EXERTION: Status: ACTIVE | Noted: 2019-04-29

## 2019-04-29 PROBLEM — R10.9 ABDOMINAL PAIN: Status: ACTIVE | Noted: 2017-12-07

## 2019-04-29 PROCEDURE — 3078F PR MOST RECENT DIASTOLIC BLOOD PRESSURE < 80 MM HG: ICD-10-PCS | Mod: CPTII,S$GLB,, | Performed by: PHYSICIAN ASSISTANT

## 2019-04-29 PROCEDURE — 99999 PR PBB SHADOW E&M-EST. PATIENT-LVL IV: CPT | Mod: PBBFAC,,, | Performed by: PHYSICIAN ASSISTANT

## 2019-04-29 PROCEDURE — 99215 OFFICE O/P EST HI 40 MIN: CPT | Mod: S$GLB,,, | Performed by: PHYSICIAN ASSISTANT

## 2019-04-29 PROCEDURE — 3078F DIAST BP <80 MM HG: CPT | Mod: CPTII,S$GLB,, | Performed by: PHYSICIAN ASSISTANT

## 2019-04-29 PROCEDURE — 1101F PT FALLS ASSESS-DOCD LE1/YR: CPT | Mod: CPTII,S$GLB,, | Performed by: PHYSICIAN ASSISTANT

## 2019-04-29 PROCEDURE — 3077F SYST BP >= 140 MM HG: CPT | Mod: CPTII,S$GLB,, | Performed by: PHYSICIAN ASSISTANT

## 2019-04-29 PROCEDURE — 99215 PR OFFICE/OUTPT VISIT, EST, LEVL V, 40-54 MIN: ICD-10-PCS | Mod: S$GLB,,, | Performed by: PHYSICIAN ASSISTANT

## 2019-04-29 PROCEDURE — 1101F PR PT FALLS ASSESS DOC 0-1 FALLS W/OUT INJ PAST YR: ICD-10-PCS | Mod: CPTII,S$GLB,, | Performed by: PHYSICIAN ASSISTANT

## 2019-04-29 PROCEDURE — 3077F PR MOST RECENT SYSTOLIC BLOOD PRESSURE >= 140 MM HG: ICD-10-PCS | Mod: CPTII,S$GLB,, | Performed by: PHYSICIAN ASSISTANT

## 2019-04-29 PROCEDURE — 99999 PR PBB SHADOW E&M-EST. PATIENT-LVL IV: ICD-10-PCS | Mod: PBBFAC,,, | Performed by: PHYSICIAN ASSISTANT

## 2019-04-29 RX ORDER — FERROUS SULFATE 220 (44)/5
220 SOLUTION, ORAL ORAL DAILY
COMMUNITY
End: 2020-03-10

## 2019-04-29 NOTE — PATIENT INSTRUCTIONS
Prior to surgery you will need to complete:  - Dietitian consult and follow up appointments as needed  - PCP clearance  - Labs  - Chest X-ray  - EKG  - Psychological evaluation, Please call psychiatry [phone] to schedule  - IVC filter  - EGD results   - Stress test       In preparation for bariatric surgery, please complete the following:   · Discuss your current medications with your primary care provider, remember your medications will need to be crushed, chewable, or in liquid form for the first 3-6 months after a gastric bypass or sleeve.  For a gastric band, your medications will need to be crushed indefinitely.    · If you take a blood thinner such as: Coumadin (warfarin), Pradaxa (dabigatran), or Plavix (clopidogrel), you will need to speak with your prescribing provider on how or if this medication can be stopped before surgery.   · If you take a medication for depression or anxiety, you will need to begin crushing or opening the capsule 1-3 months prior to surgery.  Remember to discuss this with the psychologist or psychiatrist that you see.   · If you take medication for arthritis on a daily basis that is considered a non-steroidal anti-inflammatory (NSAID), please discuss with your prescribing physician an alternative medication.  After having gastric bypass or gastric sleeve, this group of medications is not appropriate to take due to increased risk of bleeding stomach ulcers.      DEFINITIONS  Appointments: Pre-scheduled meetings or consultations with any physician, advanced practice provider, dietitian, or psychologist, and labs, imaging studies, sleep studies, etc.   Late cancellation: Cancelling an appointment 24-48 hours prior to scheduled time.  No-Show appointment:  is when    You do NOT arrive to your appointment at the time its scheduled.   You call to cancel or cancel via [facility] less than 24 hours in advance of your scheduled appointment   You show up 15 minutes AFTER your  scheduled appointment time without any notification of being late.     POLICY  1. You are allowed up to 3 cancellations for appointments.    After 3 cancellations your case will be placed on hold for 2 months and after that time you can resume the program.   2. You are allowed only 1 no-show for an appointment.    You will be re-scheduled one time and if there is a 2nd no-show at any point, your case will be placed on hold for 3 months.  After 3 months you can resume the program.     3. Upon resuming the program after being placed on hold for either above mentioned reasons, if you have a late cancel or no show for any appointment, the bariatric team will review if youre an appropriate candidate for surgery at the monthly meeting.

## 2019-04-29 NOTE — PROGRESS NOTES
Bariatric Surgery Online Course Form Submission  Someone has submitted a Bariatric Surgery Online Course Form Submission on this page.  Date: 04- 13:12:00  Patient's Name: natalie ndiaye  YOB: 1950 Email: [email or web address]  Phone: [phone]   Patient Address: [address] [place] [id number]  Preferred Surgical Location: [facility]   I certify that I am 18 years of age or older: Yes   Confirmation Code: [id number]  Verification of Bariatric Seminar: yes  Insurance Information  Insurance or Self Pay? Insurance - Fill out fields below  Insurance Company Name: [employer] gold plus   Type of Coverage/Coverage Plan (i.e. PPO, HMO): medicaid,medicare   Group Name: [employer]   Subscriber Name: human[employer], medicare,medicaid   Member Name (patient's name): natalie ndiaye   Member ID/Policy #:[id number]   Plan Effective Date: 07/11/2018  Card Issuance date: 07/11/2018

## 2019-04-29 NOTE — PROGRESS NOTES
BARIATRIC NEW PATIENT EVALUATION    CHIEF COMPLAINT:   Morbid Obesity Body mass index is 61.08 kg/m². and inability to lose weight.    HISTORY OF PRESENT ILLNESS:  Ca Coats  is a 68 y.o. female presenting for morbid obesity Body mass index is 61.08 kg/m². and inability to lose weight. Pt states that she was not always overweight, states that it was later in life that she started to gain a lot of her weight. Used to walk 5 days a week, states that she was a , and she would walk to work ( [place] Pt states that she has bad knees and is in need of surgery, states that they will not do the surgery without the weight loss.States can lose about 2-3 lbs but weight comes back on double. The patient has tried Weight Watchers, Slim fast, low calorie, calorie restriction. Presently at  highest weight. Pt states she would happy 180-200 lbs.     Pt states that she started the program at [facility], surgery scheduled in September and was found to have ulcers during the EGD and he stopped the surgery for her to get treatment. States that after getting the treatment for the ulcers, she started again, but had to do the nicotine testing. States that she was in smoking cessation program at [facility] and was placed on the patches and using nicotine gum, but when the results came back it was high and he refused to do the surgery. States that she became upset and then she was dismissed from the program and then the doctor left the program.    PAST MEDICAL HISTORY:  Past Medical History:   Diagnosis Date    Anemia     2018    Arthritis     BMI 60.0-69.9, adult     Cataract     Depression     Dry eye syndrome     GERD (gastroesophageal reflux disease)     Glaucoma suspect     History of gastric ulcer     Hyperlipidemia     Hypertension     Hypothyroidism     Morbid obesity     Neuromuscular disorder     Thyroid disease          PAST SURGICAL HISTORY:  Past Surgical  History:   Procedure Laterality Date    [id number] Left 2018    [facility]    [id number], LEFT Left 3/19/2019    Per[facility] by [facility]ya [facility]acker [facility] at [facility]    APPENDECTOMY      BLOCK, NERVE Left 7/10/2018    [facility]    CATARACT EXTRACTION W/  INTRAOCULAR LENS IMPLANT Bilateral     MFIOL OU    cataract surgery   2017    Injection LEFT KNEE STEROID INJECTION Left 2018    [facility] at [facility]    INJECTION-JOINT Left 2018    [facility] by [facility] at [facility]       FAMILY HISTORY:  Family History   Problem Relation Age of Onset    No Known Problems Mother     No Known Problems Father        SOCIAL HISTORY:  Social History     Socioeconomic History    Marital status: Single     Spouse name: Not on file    Number of children: Not on file    Years of education: Not on file    Highest education level: Not on file   Occupational History    Not on file   Social Needs    Financial resource strain: Not on file    Food insecurity:     Worry: Not on file     Inability: Not on file    Transportation needs:     Medical: Not on file     Non-medical: Not on file   Tobacco Use    Smoking status: Former Smoker     Packs/day: 0.25     Years: 50.00     Pack years: 12.50     Types: Cigarettes     Last attempt to quit: 11/15/2018     Years since quittin.4    Smokeless tobacco: Never Used   Substance and Sexual Activity    Alcohol use: Not Currently    Drug use: No    Sexual activity: Not Currently     Partners: Male   Lifestyle    Physical activity:     Days per week: Not on file     Minutes per session: Not on file    Stress: Not on file   Relationships    Social connections:     Talks on phone: Not on file     Gets together: Not on file     Attends Nondenominational service: Not on file     Active member of club or organization:  Not on file     Attends meetings of clubs or organizations: Not on file     Relationship status: Not on file   Other Topics Concern    Not on file   Social History Narrative    Not on file       MEDICATIONS:  Current Outpatient Medications   Medication Sig Dispense Refill    amLODIPine (NORVASC) 5 MG tablet Take 5 mg by mouth once daily.       atorvastatin (LIPITOR) 40 MG tablet Take 40 mg by mouth once daily.       B-complex with vitamin C (Z-BEC OR EQUIV) tablet Take 1 tablet by mouth once daily.       CALCIUM CITRATE-VITAMIN D2 ORAL Take 1 tablet by mouth once daily.       gabapentin (NEURONTIN) 300 MG capsule Take 300 mg by mouth 2 (two) times daily.       lisinopril (PRINIVIL,ZESTRIL) 40 MG tablet Take 40 mg by mouth once daily.       multivitamin with minerals tablet Take 1 tablet by mouth once daily.       oxyCODONE-acetaminophen (PERCOCET) 5-325 mg per tablet Take 1 tablet by mouth every 6 (six) hours as needed for Pain. 20 tablet 0    pantoprazole (PROTONIX) 40 MG tablet Take 40 mg by mouth 2 (two) times daily.       sucralfate (CARAFATE) 1 gram tablet Take 1 g by mouth 4 (four) times daily before meals and nightly.       VITAMIN B-12 5,000 mcg Subl Take 1 tablet by mouth once daily.        No current facility-administered medications for this visit.        ALLERGIES:  Review of patient's allergies indicates:  No Known Allergies    ROS:  Review of Systems   Constitutional: Negative for chills and fever.   HENT: Negative for tinnitus.    Eyes: Negative for blurred vision and double vision.   Respiratory: Positive for shortness of breath (during walking).    Cardiovascular: Positive for leg swelling (BLE feet ). Negative for chest pain and palpitations.   Gastrointestinal: Negative for abdominal pain, constipation, diarrhea, heartburn, nausea and vomiting.   Genitourinary: Negative for dysuria and hematuria.   Musculoskeletal: Negative for back pain.   Skin: Negative for rash.   Neurological:  "Positive for dizziness (intermittent, orthostatic ) and headaches (intermittent, resolves with rest ).   Psychiatric/Behavioral: Negative for depression, substance abuse and suicidal ideas.       PE:  Vitals:    04/29/19 1039   BP: (!) 161/75   Pulse: 86   Weight: (!) 166.5 kg (367 lb 1.1 oz)   Height: 5' 5" (1.651 m)       Physical Exam   Constitutional: She appears well-developed and well-nourished.   HENT:   Head: Normocephalic and atraumatic.   Eyes: Pupils are equal, round, and reactive to light. Conjunctivae and EOM are normal.   Neck: Normal range of motion. Neck supple. No JVD present.   Cardiovascular: Normal rate, regular rhythm, normal heart sounds and intact distal pulses.   No murmur heard.  Pulmonary/Chest: Effort normal and breath sounds normal. She has no wheezes.   Abdominal: Soft. Bowel sounds are normal. There is no tenderness. No hernia.   Musculoskeletal: Normal range of motion. She exhibits no edema or tenderness.   Neurological: She is alert. She displays normal reflexes.   Skin: Skin is warm. Capillary refill takes less than 2 seconds. No rash noted. No erythema.   Psychiatric: She has a normal mood and affect. Her behavior is normal. Judgment and thought content normal.        DIAGNOSIS:  1. Morbid Obesity with Body mass index is 61.08 kg/m². and inability to lose weight.  2. Co-morbidities: LAURA, HLD, HTN, Anemia     PLAN:  The patient is Potential  candidate for Bariatric Surgery. she is interested in laparoscopic Aleksander-en-Y with Dr. Cleveland. The surgery and post-op care were discussed in detail with the patient. All questions were answered.    she understands that bariatric surgery is a tool to aid in weight loss and that she needs to be committed to the diet and exercise post-operatively for successful weight loss.  Discussed expected weight loss outcomes after surgery which is 50% of the excess weight on her frame.  Goal weight is 200#s.  Discussed with patient that bariatric surgery is " not the easy way out and that it will take plenty of dedication on the patient's part to be successful. Also discussed the possibility of weight regain if the patient strays from the diet guidelines or exercise requirements. Patient verbalized understanding and wishes to proceed with the work-up.    ORDERS:  1. Stress Test, Chest X-Ray, EKG, EGD and IVC Filter  2. Psychological Consult, Bariatric Dietician Consult and PCP Clearance  3. Bariatric Labs: BMP, CBC, Folate Serum, H. pylori, HgA1C, Hepatic Panel/LFT, Iron & TIBC, Lipid Profile, Magnesium, Phosphate, T3, T4, TSH, Free T4, Vitamin B12, and Vitamin B1.  4. GEO from Dr. Taisha Lin, [facility]    Primary Physician: Hannah Meier MD  RTC: As scheduled.    60 minute visit, over 50% of time spent counseling patient face to face on surgical options, risks, benefits, expected diet, recommended exercise regimen, and expected weight loss.

## 2019-05-13 ENCOUNTER — CLINICAL SUPPORT (OUTPATIENT)
Dept: BARIATRICS | Facility: CLINIC | Age: 69
End: 2019-05-13
Payer: MEDICARE

## 2019-05-13 ENCOUNTER — LAB VISIT (OUTPATIENT)
Dept: LAB | Facility: HOSPITAL | Age: 69
End: 2019-05-13
Payer: MEDICARE

## 2019-05-13 VITALS — HEIGHT: 65 IN | WEIGHT: 293 LBS | BODY MASS INDEX: 48.82 KG/M2

## 2019-05-13 DIAGNOSIS — E66.01 MORBID OBESITY WITH BMI OF 60.0-69.9, ADULT: ICD-10-CM

## 2019-05-13 DIAGNOSIS — M25.562 CHRONIC PAIN OF LEFT KNEE: ICD-10-CM

## 2019-05-13 DIAGNOSIS — G47.33 OBSTRUCTIVE SLEEP APNEA: ICD-10-CM

## 2019-05-13 DIAGNOSIS — Z71.3 NUTRITIONAL COUNSELING: ICD-10-CM

## 2019-05-13 DIAGNOSIS — I10 ESSENTIAL HYPERTENSION: ICD-10-CM

## 2019-05-13 DIAGNOSIS — G89.29 CHRONIC PAIN OF LEFT KNEE: ICD-10-CM

## 2019-05-13 DIAGNOSIS — E78.5 HYPERLIPIDEMIA, UNSPECIFIED HYPERLIPIDEMIA TYPE: ICD-10-CM

## 2019-05-13 DIAGNOSIS — D51.9 ANEMIA DUE TO VITAMIN B12 DEFICIENCY, UNSPECIFIED B12 DEFICIENCY TYPE: ICD-10-CM

## 2019-05-13 DIAGNOSIS — R73.09 OTHER ABNORMAL GLUCOSE: ICD-10-CM

## 2019-05-13 DIAGNOSIS — D51.3 OTHER DIETARY VITAMIN B12 DEFICIENCY ANEMIA: ICD-10-CM

## 2019-05-13 LAB
25(OH)D3+25(OH)D2 SERPL-MCNC: 48 NG/ML (ref 30–96)
ALBUMIN SERPL BCP-MCNC: 3.1 G/DL (ref 3.5–5.2)
ALP SERPL-CCNC: 113 U/L (ref 55–135)
ALT SERPL W/O P-5'-P-CCNC: 13 U/L (ref 10–44)
ANION GAP SERPL CALC-SCNC: 9 MMOL/L (ref 8–16)
AST SERPL-CCNC: 15 U/L (ref 10–40)
BASOPHILS # BLD AUTO: 0.04 K/UL (ref 0–0.2)
BASOPHILS NFR BLD: 0.3 % (ref 0–1.9)
BILIRUB DIRECT SERPL-MCNC: 0.2 MG/DL (ref 0.1–0.3)
BILIRUB SERPL-MCNC: 0.3 MG/DL (ref 0.1–1)
BUN SERPL-MCNC: 15 MG/DL (ref 8–23)
CALCIUM SERPL-MCNC: 9.6 MG/DL (ref 8.7–10.5)
CHLORIDE SERPL-SCNC: 109 MMOL/L (ref 95–110)
CHOLEST SERPL-MCNC: 119 MG/DL (ref 120–199)
CHOLEST/HDLC SERPL: 2.8 {RATIO} (ref 2–5)
CO2 SERPL-SCNC: 25 MMOL/L (ref 23–29)
CREAT SERPL-MCNC: 0.8 MG/DL (ref 0.5–1.4)
DIFFERENTIAL METHOD: ABNORMAL
EOSINOPHIL # BLD AUTO: 0.1 K/UL (ref 0–0.5)
EOSINOPHIL NFR BLD: 0.7 % (ref 0–8)
ERYTHROCYTE [DISTWIDTH] IN BLOOD BY AUTOMATED COUNT: 12 % (ref 11.5–14.5)
EST. GFR  (AFRICAN AMERICAN): >60 ML/MIN/1.73 M^2
EST. GFR  (NON AFRICAN AMERICAN): >60 ML/MIN/1.73 M^2
ESTIMATED AVG GLUCOSE: 88 MG/DL (ref 68–131)
FOLATE SERPL-MCNC: 17.6 NG/ML (ref 4–24)
GLUCOSE SERPL-MCNC: 102 MG/DL (ref 70–110)
HBA1C MFR BLD HPLC: 4.7 % (ref 4–5.6)
HCT VFR BLD AUTO: 36.8 % (ref 37–48.5)
HDLC SERPL-MCNC: 43 MG/DL (ref 40–75)
HDLC SERPL: 36.1 % (ref 20–50)
HGB BLD-MCNC: 11.5 G/DL (ref 12–16)
IMM GRANULOCYTES # BLD AUTO: 0.06 K/UL (ref 0–0.04)
IMM GRANULOCYTES NFR BLD AUTO: 0.5 % (ref 0–0.5)
IRON SERPL-MCNC: 37 UG/DL (ref 30–160)
LDLC SERPL CALC-MCNC: 57.8 MG/DL (ref 63–159)
LYMPHOCYTES # BLD AUTO: 3.2 K/UL (ref 1–4.8)
LYMPHOCYTES NFR BLD: 26.4 % (ref 18–48)
MAGNESIUM SERPL-MCNC: 2 MG/DL (ref 1.6–2.6)
MCH RBC QN AUTO: 30.4 PG (ref 27–31)
MCHC RBC AUTO-ENTMCNC: 31.3 G/DL (ref 32–36)
MCV RBC AUTO: 97 FL (ref 82–98)
MONOCYTES # BLD AUTO: 0.8 K/UL (ref 0.3–1)
MONOCYTES NFR BLD: 6.9 % (ref 4–15)
NEUTROPHILS # BLD AUTO: 8 K/UL (ref 1.8–7.7)
NEUTROPHILS NFR BLD: 65.2 % (ref 38–73)
NONHDLC SERPL-MCNC: 76 MG/DL
NRBC BLD-RTO: 0 /100 WBC
PHOSPHATE SERPL-MCNC: 3.7 MG/DL (ref 2.7–4.5)
PLATELET # BLD AUTO: 287 K/UL (ref 150–350)
PMV BLD AUTO: 10.5 FL (ref 9.2–12.9)
POTASSIUM SERPL-SCNC: 4.6 MMOL/L (ref 3.5–5.1)
PROT SERPL-MCNC: 7.2 G/DL (ref 6–8.4)
RBC # BLD AUTO: 3.78 M/UL (ref 4–5.4)
SATURATED IRON: 11 % (ref 20–50)
SODIUM SERPL-SCNC: 143 MMOL/L (ref 136–145)
T3 SERPL-MCNC: 87 NG/DL (ref 60–180)
T4 FREE SERPL-MCNC: 0.88 NG/DL (ref 0.71–1.51)
T4 SERPL-MCNC: 5.5 UG/DL (ref 4.5–11.5)
TOTAL IRON BINDING CAPACITY: 352 UG/DL (ref 250–450)
TRANSFERRIN SERPL-MCNC: 238 MG/DL (ref 200–375)
TRIGL SERPL-MCNC: 91 MG/DL (ref 30–150)
TSH SERPL DL<=0.005 MIU/L-ACNC: 0.83 UIU/ML (ref 0.4–4)
VIT B12 SERPL-MCNC: 1040 PG/ML (ref 210–950)
WBC # BLD AUTO: 12.22 K/UL (ref 3.9–12.7)

## 2019-05-13 PROCEDURE — 84425 ASSAY OF VITAMIN B-1: CPT

## 2019-05-13 PROCEDURE — 84439 ASSAY OF FREE THYROXINE: CPT

## 2019-05-13 PROCEDURE — 84100 ASSAY OF PHOSPHORUS: CPT

## 2019-05-13 PROCEDURE — 36415 COLL VENOUS BLD VENIPUNCTURE: CPT

## 2019-05-13 PROCEDURE — 84436 ASSAY OF TOTAL THYROXINE: CPT

## 2019-05-13 PROCEDURE — 99999 PR PBB SHADOW E&M-EST. PATIENT-LVL II: CPT | Mod: PBBFAC,,, | Performed by: DIETITIAN, REGISTERED

## 2019-05-13 PROCEDURE — 84480 ASSAY TRIIODOTHYRONINE (T3): CPT

## 2019-05-13 PROCEDURE — 83540 ASSAY OF IRON: CPT

## 2019-05-13 PROCEDURE — 82746 ASSAY OF FOLIC ACID SERUM: CPT

## 2019-05-13 PROCEDURE — 84443 ASSAY THYROID STIM HORMONE: CPT

## 2019-05-13 PROCEDURE — 83036 HEMOGLOBIN GLYCOSYLATED A1C: CPT

## 2019-05-13 PROCEDURE — 97802 MEDICAL NUTRITION INDIV IN: CPT | Mod: PBBFAC | Performed by: DIETITIAN, REGISTERED

## 2019-05-13 PROCEDURE — 99212 OFFICE O/P EST SF 10 MIN: CPT | Mod: PBBFAC | Performed by: DIETITIAN, REGISTERED

## 2019-05-13 PROCEDURE — 80048 BASIC METABOLIC PNL TOTAL CA: CPT

## 2019-05-13 PROCEDURE — 80061 LIPID PANEL: CPT

## 2019-05-13 PROCEDURE — 99499 UNLISTED E&M SERVICE: CPT | Mod: S$PBB,,, | Performed by: DIETITIAN, REGISTERED

## 2019-05-13 PROCEDURE — 83735 ASSAY OF MAGNESIUM: CPT

## 2019-05-13 PROCEDURE — 85025 COMPLETE CBC W/AUTO DIFF WBC: CPT

## 2019-05-13 PROCEDURE — 99499 NO LOS: ICD-10-PCS | Mod: S$PBB,,, | Performed by: DIETITIAN, REGISTERED

## 2019-05-13 PROCEDURE — 80323 ALKALOIDS NOS: CPT

## 2019-05-13 PROCEDURE — 82306 VITAMIN D 25 HYDROXY: CPT

## 2019-05-13 PROCEDURE — 99999 PR PBB SHADOW E&M-EST. PATIENT-LVL II: ICD-10-PCS | Mod: PBBFAC,,, | Performed by: DIETITIAN, REGISTERED

## 2019-05-13 PROCEDURE — 86677 HELICOBACTER PYLORI ANTIBODY: CPT

## 2019-05-13 PROCEDURE — 80076 HEPATIC FUNCTION PANEL: CPT

## 2019-05-13 PROCEDURE — 82607 VITAMIN B-12: CPT

## 2019-05-13 NOTE — PROGRESS NOTES
"NUTRITIONAL CONSULT    Referring Physician: Cristofer  Reason for MNT Referral: Initial assessment for laparoscopic Aleksander-en-Y work-up    PAST MEDICAL HISTORY:   68 y.o. female  Body mass index is 62.18 kg/m².  Dieting attempts include Weight Watchers, Slim fast, low calorie, calorie restriction.      Past Medical History:   Diagnosis Date    Anemia     2018    Arthritis     BMI 60.0-69.9, adult     Cataract     Depression     Dry eye syndrome     GERD (gastroesophageal reflux disease)     Glaucoma suspect     History of gastric ulcer     Hyperlipidemia     Hypertension     Hypothyroidism     Morbid obesity     Neuromuscular disorder     Thyroid disease        CLINICAL DATA:  68 y.o.-year-old Black or  female.  Height: 5'5"  Weight: 373 lbs  IBW: 141 lbs  BMI: 62.18  The patient's goal weight (60% EBW): 234 lbs  Personal goal weight: 180-190 lbs    Goal for Bariatric Surgery: to improve quality of life, to lose weight and decrease knee pain and possible have knee surgery'    NUTRITIONAL NEEDS:  2233 x1.2 activity factor = 2680 - 1000 for wt loss = 1680 Calories (using [address] Jeor Equation)   Grams Protein (1.5-1.8 gm/kg IBW)    NUTRITION & HEALTH HISTORY:  Greatest challenge: irregular meal patterns    Current diet recall:   Breakfast: biscuit or none  Lunch: none or sandwich or 2 boiled eggs  Snack: banana daily  Dinner: salad with chicken, eggs, beets or pizza (for mother's day) or sandwich    Current Diet:  Meal pattern: 1-2 meals per day  Protein supplements: has protein powder, Ensure/Boost  Snackin / day  Vegetables: Likes a variety. Eats once per week.  Fruits: Likes a few. Eats daily.  Beverages: water, coffee without sugar and regular coffee with creamer (36-48 oz/day)  Dining out: not usually   Cooking at home: Weekly. Mostly baked meat. - Pt does not cook (only bakes chicken to put on salad)    Exercise:  Current exercise: None  Restrictions to exercise: knee " pain    Vitamins / Minerals / Herbs:   B12, MV, iron, calcium, Bcomplex      Labs:   Not available at time of visit     Food Allergies:   none    Social:  No work.  Lives with self.  Grocery shopping and food prep - patient completes.  Patient believes the household will be supportive after surgery. (Has a sitter x5/wk)  Alcohol: rarely.  Smoking: Quit a few months ago but people around her smoke    ASSESSMENT:  · Patient reports attempts at weight loss, only to regain lost weight.  · Patient demonstrated knowledge of healthy eating behaviors and exercise patterns; admits to not eating healthy and not exercising at this point.  · Patient states willingness to change lifestyle and make behavior modifications as evidenced by plan to make healthier lifestyle changes after today's consultation..    Insurance does not require medically supervised diet prior to consideration for bariatric surgery.    Barriers to Education: none    Stage of change: determination    NUTRITION DIAGNOSIS:    Obesity related to Inadequate protein intake and Physical inactivity as evidence by BMI.    BARIATRIC DIET DISCUSSION/PLAN:  Discussed diet after surgery and related to patient's food record.  Reviewed nutrition guidelines for before and after surgery.  Answered all questions.  Work on expanding variety of vegetables.  Work on gradually cutting back on starchy CHO in the diet.  Begin trying various protein supplements to determine preference.  5-6 meals per day.  Start including protein supplements in the diet plan daily.  Increase exercise.  Return to clinic.  keep food log    RECOMMENDATIONS:  Patient is a potential candidate for bariatric surgery.    Needs additional visit(s) with RD.    Patient verbalized understanding.    Expect good  compliance after surgery at this time.    Communicated nutrition plan with bariatric team.    SESSION TIME:  60 minutes    Addendum: 5/14/2019 - pt's iron low - discussed with pt to take calcium and  iron 2 hours apart from each other (pt agreed to do this). Also discussed her needing to consume adequate protein/iron rich foods which should also help with her wt loss and iron stores.  Discussed with pt that Dr. Cleveland would like for her to reach 360lbs before her liquid diet.  Pt verbalized understanding and RD encouraged pt to call if questions arise.    Addendum: 5/16/2019: pt asking about corn flakes - RD recommended to avoid and try to consume more protein rich foods such as eggs, cheese, yogurt/cottage cheese with fruit.

## 2019-05-13 NOTE — PROGRESS NOTES
Normal labs, slightly decreased iron stores, will have dietician call to discuss MVI with iron or multivitamin use with OTC iron supplementation.

## 2019-05-13 NOTE — PATIENT INSTRUCTIONS
Goals:   Work on expanding variety of vegetables.  Work on gradually cutting back on starchy CHO in the diet.  Begin trying various protein supplements to determine preference.  5-6 meals per day.  Start including protein supplements in the diet plan daily.  Increase exercise.  Return to clinic.  keep food log.    Meal Ideas for Regular Bariatric Diet  *Recipes and products available at [email or web address]      Breakfast: (15-20g protein)    - Egg white omelet: 2 egg whites or ½ cup Egg Beaters. (Optional proteins: cheese, shrimp, black beans, chicken, sliced turkey) (Optional veggies: tomatoes, salsa, spinach, mushrooms, onions, green peppers, or small slice avocado)     - Egg and sausage: 1 egg or ¼ cup Egg Beaters (any variety), with 1 heather or 2 links of Turkey sausage or Veggie breakfast sausage (The Combine or SoSocio)    - Crust-less breakfast quiche: To make a glass pie dish, mix 4oz part skim Ricotta, 1 cup skim milk, and 2 eggs as your base. Add protein: shredded cheese, sliced lean ham or turkey, turkey vinson/sausage. Add veggies: tomato, onion, green onion, mushroom, green pepper, spinach, etc.    - Yogurt parfait: Mix 1 - 6oz container Dannon Light N Fit vanilla yogurt, with ¼ cup crushed unsalted nuts    - Cottage cheese and fruit: ½ cup part-skim cottage cheese or ricotta cheese topped with fresh fruit or sugar free preserves     - Mago Castillo's Vanilla Egg custard* (add 2 Tbsp instant coffee granules to make Cappuccino Custard*)    - Hi-Protein café latte (skim milk, decaf coffee, 1 scoop protein powder). Optional to add Sugar free syrup or extract flavoring.    - Breakfast Lox: spread fat free cream cheese on slices of smoked salmon. Serve over scrambled or egg over easy (sauteed with nonstick cookspray) OR on a cucumber slice    - Eggwhich: Scramble or cook 1 large egg over easy using nonstick cookspray. Place between 2 slices of [place] vinson and low fat cheese.     Lunch: (20-30g  protein)    - ½ cup Black bean soup (Homemade or Progresso), with ¼ cup shredded low-fat cheese. Top with chopped tomato or fresh salsa.     - Lean deli turkey breast and low-fat sliced cheese, mustard or light bell to moisten, rolled up together, or wrapped in a [place] lettuce leaf    - Chicken salad made from dinner leftovers, moisten with low-fat salad dressing or light bell. Also try leftover salmon, shrimp, tuna or boiled eggs. Serve ½ cup over dark green salad    - Fat-free canned refried beans, topped with ¼ cup shredded low-fat cheese. Top with chopped tomato or fresh salsa.     - Greek salad: Top mixed greens with 1-2oz grilled chicken, tomatoes, red onions, 2-3 kalamata olives, and sprinkle lightly with feta cheese. Spritz with Balsamic vinegar to taste.     - Crust-less lunch quiche: To make a glass pie dish, mix 4oz part skim Ricotta, 1 cup skim milk, and 2 eggs as your base. Add protein: shredded cheese, sliced lean ham or turkey, shrimp, chicken. Add veggies: tomato, onion, green onion, mushroom, green pepper, spinach, artichoke, broccoli, etc.    - Pizza bake: spread a  tylor hemal mushroom with tomato sauce, low-fat shredded mozzarella and turkey pepperoni or [place] vinson. Add any veggies. Roast for 10-15 minutes, until cheese melted.     - Cucumber crab bites: Spread ¼ cup crab dip (lump crabmeat + light cream cheese and green onions) over sliced cucumber.     - Chicken with light spinach and artichoke dip*: Puree in : 6oz cooked and drained spinach, 2 cloves garlic, 1 can cannelloni beans, ½ cup chopped green onions, 1 can drained artichoke hearts (not marinated in oil), lemon juice and basil. Mix in 2oz chopped up chicken.    Supper: (20-30g protein)    - Serve grilled fish over dark green salad tossed with low-fat dressing, served with grilled asparagus esqueda     - Rotisserie chicken salad: served with sliced strawberries, walnuts, fat-free feta cheese crumbles and 1 tbsp  Jonnie Own Light Raspberry [place] Vinaigrette    - Shrimp cocktail: Dip cold boiled shrimp in homemade low-sugar cocktail sauce (1/2 cup Sanjay One Carb ketchup, 2 tbsp horseradish, 1/4 tsp hot sauce, 1 tsp Worcestershire sauce, 1 tbsp freshly-squeezed lemon juice). Serve with dark green salad, walnuts, and crumbled blue cheese drizzled with olive oil and Balsamic vinegar    - Tuna Melt: Spread tuna salad onto 2 thick slices of tomato. Top with low-fat cheese and broil until cheese is melted. May also be made with chicken salad of shrimp salad. [place] with different types of cheeses.    - Chicken or beef fajitas (no tortilla, rice, beans, chips). Top meat and veggies w/ fresh salsa, fat free sour cream.     - Homemade low-fat Chili using extra lean ground beef or ground turkey. Top with shredded cheese and salsa as desired. May add dollop fat-free sour cream if desired    - Chicken parmesan: Top chicken breast w/ low sugar marinara sauce, mozzarella cheese and bake until chicken reaches 165*.  Serve w/ spaghetti SQUASH or Angolan cut green beans    - Dinner Omelet with shrimp or chicken and onion, green peppers and chives.    - No noodle lasagna: Use sliced zucchini or eggplant in place of noodles.  Layer with part skim ricotta cheese and low sugar meat sauce (use very lean ground beef or ground turkey).    - Mexican chicken bake: Bake chunks of chicken breast or thigh with taco seasoning, Pace brand enchilada sauce, green onions and low-fat cheese. Serve with ¼ cup black beans or fat free refried beans topped with chopped tomatoes or salsa.    - Annette frozen meatballs, simmered in Classico Marinara sauce. Different flavors of salsa or spaghetti sauce create different dishes! Sprinkle with parmesan cheese. Serve with grilled or steamed veggies, or a dark green salad.    - Simmer boneless skinless chicken thigh chunks in Classico Marinara sauce or roasted salsa until tender with chopped onion, bell pepper,  garlic, mushrooms, spinach, etc.     - Hamburger or veggie burger, without the bun, dressed the way you like. Served with grilled or steamed veggies.    - Eggplant parmesan: Bake slices of eggplant at 350 degrees for 15 minutes. Layer tomato sauce, sliced eggplant and low-fat mozzarella cheese in a baking dish and cover with foil. Bake 30-40 more minutes or until bubbly. Uncover and bake at 400 degrees for about 15 more minutes, or until top is slightly crisp.    - Fish tacos: grilled/baked white fish, wrapped in [place] lettuce leaf, topped with salsa, shredded low-fat cheese, and light coleslaw.    - Chicken justin: Sprinkle chicken w/ 1 tsp of hidden valley ranch dip mix. Then grill chicken and top with black beans, salsa and 1 tsp fat free sour cream.     - Cauliflower pizza crust: Use cauliflower as crust ([facility] recipe [facility], no flour!). Top w/ low fat cheese, turkey pepperoni and veggies and bake again    - chicken or turkey crust pizza: use ground chicken or turkey instead of cauliflower, spread in [place] and bake at 350 for about 20-30 minutes(may want to add garlic, black pepper, oregano and other herbs to ground meat mixture).  Remove and top w/ low fat cheese, turkey pepperoni and veggies and bake again for another 10 minutes or until cheese is browned.     Snacks: (100-200 calories; >5g protein)    - 1 low-fat cheese stick with 8 cherry tomatoes or 1 serving fresh fruit  - 4 thin slices fat-free turkey breast and 1 slice low-fat cheese  - 4 thin slices fat-free honey ham with wedge of melon  - 6-8 edamame pods (equivalent to about 1/4 cup edamame without pods).   - 1/4 cup unsalted nuts with ½ cup fruit  - 6-oz container Dannon Light n Fit vanilla yogurt, topped with 1oz unsalted nuts         - apple, celery or baby carrots spread with 2 Tbsp PB2  - apple slices with 1 oz slice low-fat cheese  - Apple slices dipped in 2 Tbsp of PB2  - celery, cucumber, bell pepper or baby carrots dipped in ¼ cup  hummus bean spread or light spinach and artichoke dip (*recipe in lunch section)  - celery, cucumber, baby carrots dipped in high protein greek yogurt (Mix 16 oz plain greek yogurt + 1 packet of hidden [place] ranch dip mix)  - Mike Rae Beef Steak - 14g protein! (similar to beef jerky)  - 2 wedges Laughing Cow - Light Herb & Garlic Cheese with sliced cucumber or green bell pepper  - 1/2 cup low-fat cottage cheese with ¼ cup fruit or ¼ cup salsa  - RTD Protein drinks: Atkins, Low Carb Slim Fast, EAS light, Muscle Milk Light, etc.  - Homemade Protein drinks: GNC Soy95, Isopure, Nectar, UNJURY, Whey Gourmet, etc. Mix 1 scoop powder with 8oz skim/1% milk or light soymilk.  - Protein bars: Atkins, EAS, Pure Protein, Think Thin, Detour, etc. Must have 0-4 grams sugar - Read the label.    Takeout Options: No more than twice/week  Deli - Salads (no pasta or rice), meats, cheeses. Roasted chicken. Lox (salmon)    Mexican - Platters which don't include tortillas, chips, or rice. Go easy on the beans. Example: Fajitas without the tortillas. Ask the  not to bring chips to the table if they are too tempting.    Greek - Meat or fish and vegetable, but no bread or rice. Including hummus, baba ganoush, etc, is OK. Most sit-down Greek restaurants can provide you with cucumber slices for dipping instead of marcos bread.    Fast Food (Avoid as much as possible) - Salads (no croutons and limit salad dressing to 2 tbsp), grilled chicken sandwich without the bun and ask for no bell. Rosmerys low fat chili or Taco Bell pintos and cheese.    BBQ - The meats are fine if you ask for sauces on the side, but most of the traditional side dishes are loaded with carbs. Asa slaw, baked beans and BBQ sauce are typically made with sugar.    Chinese - Nothing deep-fried, no rice or noodles. Many Chinese sauces have starch and sugar in them, so you'll have to use your judgement. If you find that these sauces trigger cravings, or cause Dumping,  you can ask for the sauce to be made without sugar or just use soy sauce.

## 2019-05-15 DIAGNOSIS — F17.201 TOBACCO ABUSE, IN REMISSION: Primary | ICD-10-CM

## 2019-05-15 DIAGNOSIS — A04.8 POSITIVE H. PYLORI TEST: ICD-10-CM

## 2019-05-15 LAB
COTININE SERPL-MCNC: 71 NG/ML
H PYLORI IGG SERPL QL IA: POSITIVE
NICOTINE SERPL-MCNC: <3 NG/ML

## 2019-05-15 RX ORDER — BISMUTH SUBCITRATE POTASSIUM, METRONIDAZOLE AND TETRACYCLINE HYDROCHLORIDE 140; 125; 125 MG/1; MG/1; MG/1
3 CAPSULE ORAL
Qty: 120 CAPSULE | Refills: 0 | Status: SHIPPED | OUTPATIENT
Start: 2019-05-15 | End: 2019-05-25

## 2019-05-15 NOTE — PROGRESS NOTES
Spoke to patient about H pylori results, will have her take alternative therapy as she is on a statin ( did not want to cause any confusion with medication changes). Would like order to be mailed in by [facility] not [facility]. Understood, has appt on 6/3 with RD will  kit then for re testing.     Explained to patient about her cotinine test results. Pt states that she is not smoking but does have smokers around her. Encouraged pt to have them smoke outside or stop smoking all together, offered smoking cessation information. Pt states that she will speak to them. Will have lab re ordered and scheduled.     Ugo Bolden PA-C

## 2019-05-16 LAB — VIT B1 BLD-MCNC: 66 UG/L (ref 38–122)

## 2019-05-21 ENCOUNTER — TELEPHONE (OUTPATIENT)
Dept: SMOKING CESSATION | Facility: CLINIC | Age: 69
End: 2019-05-21

## 2019-05-23 ENCOUNTER — TELEPHONE (OUTPATIENT)
Dept: BARIATRICS | Facility: CLINIC | Age: 69
End: 2019-05-23

## 2019-05-23 NOTE — TELEPHONE ENCOUNTER
----- Message from Byron Bolden PA-C sent at 5/23/2019  8:57 AM CDT -----  Regarding: Cotinine   Michelle Alexander,    I ordered a repeat cotinine on Ms. Penaloza because it was high, her nicotine is normal but Dr. Cleveland would like to her cotinine drop, she needs to either stay away from people who are smoking or encourage them to smoke outside. I did explain that to her but if you could reiterate that would be nice. I also placed the order in, she needs to have it done in like a month.     Thanks much,  BM

## 2019-05-23 NOTE — TELEPHONE ENCOUNTER
Called patient in regards to her lab values. Dr. Cleveland wants her cotinine to decrease. Patient will retest once she I done with the Hpylori treatment. Patient will let us know when she is ready to schedule.

## 2019-06-03 ENCOUNTER — CLINICAL SUPPORT (OUTPATIENT)
Dept: BARIATRICS | Facility: CLINIC | Age: 69
End: 2019-06-03
Payer: MEDICAID

## 2019-06-03 VITALS — HEIGHT: 65 IN | BODY MASS INDEX: 48.82 KG/M2 | WEIGHT: 293 LBS

## 2019-06-03 DIAGNOSIS — I10 ESSENTIAL HYPERTENSION: ICD-10-CM

## 2019-06-03 DIAGNOSIS — G47.33 OBSTRUCTIVE SLEEP APNEA: ICD-10-CM

## 2019-06-03 DIAGNOSIS — Z71.3 NUTRITIONAL COUNSELING: ICD-10-CM

## 2019-06-03 DIAGNOSIS — E66.01 MORBID OBESITY WITH BMI OF 60.0-69.9, ADULT: ICD-10-CM

## 2019-06-03 PROCEDURE — 99212 OFFICE O/P EST SF 10 MIN: CPT | Mod: PBBFAC | Performed by: DIETITIAN, REGISTERED

## 2019-06-03 PROCEDURE — 99499 UNLISTED E&M SERVICE: CPT | Mod: S$PBB,,, | Performed by: DIETITIAN, REGISTERED

## 2019-06-03 PROCEDURE — 99499 NO LOS: ICD-10-PCS | Mod: S$PBB,,, | Performed by: DIETITIAN, REGISTERED

## 2019-06-03 PROCEDURE — 99999 PR PBB SHADOW E&M-EST. PATIENT-LVL II: CPT | Mod: PBBFAC,,, | Performed by: DIETITIAN, REGISTERED

## 2019-06-03 PROCEDURE — 99999 PR PBB SHADOW E&M-EST. PATIENT-LVL II: ICD-10-PCS | Mod: PBBFAC,,, | Performed by: DIETITIAN, REGISTERED

## 2019-06-03 PROCEDURE — 97803 MED NUTRITION INDIV SUBSEQ: CPT | Mod: PBBFAC,59 | Performed by: DIETITIAN, REGISTERED

## 2019-06-03 NOTE — PROGRESS NOTES
NUTRITION NOTE    Referring Physician: Cristofer  Reason for MNT Referral: MSD pending Gastric Bypass    Patient presents for 2nd visit for MSD with weight at a standstill over the past month; total weight loss by making numerous dietary and lifestyle changes. Pt reports eating smaller portions, healthier foods (low fat yogurt, fat free cottage cheese, using protein powder with milk and banana).    CLINICAL DATA:  68 y.o. female.    Current Weight: 374 lbs  Weight Change Since Initial Visit: +1 lbs  Ideal Body Weight: 141 lbs  Body mass index is 62.26 kg/m².    NUTRITIONAL NEEDS:  2233 x1.2 activity factor = 2680 - 1000 for wt loss = 1680 Calories (using [place] Jeor Equation)   Grams Protein (1.5-1.8 gm/kg IBW)    CURRENT DIET:  Reduced-calorie diet.  Diet Recall: Food records are present.    Breakfast: protein drink (milk, protein powder, banana) or eggs or cottage cheese or yogurt  Lunch:  2 boiled eggs or protein shake or 1/2 banana or tuna with salad with lemon, olive oil or chicken with green beans or roast with lentils  Snack: protein drink or banana or pudding or frozen yogurt  Dinner: salad with chicken/crab, eggs, beets, tomato  Or turkey with beans or fish with cauliflower or soup    Meal Pattern: Improved.  Protein Supplements: 1 per day.  Snacking: Inadequate. Snacks include healthy choices.  Vegetables: Likes a variety. Eats daily.  Fruits: Likes a variety. Eats daily.  Beverages: water, coffee without sugar and regular coffee with creamer (36-48 oz/day)  Dining out: not usually   Cooking at home: Weekly. Mostly baked meat. - Pt does not cook (only bakes chicken to put on salad)    CURRENT EXERCISE:  Current exercise: increased - moving more - walking outside some (with walker) and moving arms  Restrictions to exercise: knee pain    Vitamins / Minerals / Herbs:   B12, MV, iron, calcium, Bcomplex    Food Allergies:   none    Social:  No work.  Alcohol: rarely. One glass wine this  month..  Smoking: Quit a few months ago but had caregiver that smoke (she left a few weeks ago). She did have nicotine gum before her test in 5/2019.    ASSESSMENT:  Patient demonstrates some willingness to change lifestyle habits as evidenced by daily food logs, increased vegetables and healthier cooking at home.    Doing fairly well with working on greatest challenges (irregular meal patterns).    Barriers to Education:  none  Stage of Change:  action    NUTRITION DIAGNOSIS:  Obesity related to Excessive calorie intake and Physical inactivity as evidence by BMI.  Status: Improved    PLAN:  Pt is a potential candidate for surgery.    Diet: Maintain diet plan.  Work on expanding variety of vegetables.  Work on gradually cutting back on starchy CHO in the diet.  5-6 meals per day.  Start including protein supplements in the diet plan daily.  Return to clinic.    Exercise: Increase as tolerated    Behavior Modification: Continue to document food & activity logs daily.    Weight loss prior to surgery (5-10% TBW): 360lbs per Dr. Cleveland    Return to clinic in one month.  Needs additional visit(s) with RD.    Communicated nutrition plan with bariatric team.    SESSION TIME:  30 minutes

## 2019-06-03 NOTE — PATIENT INSTRUCTIONS
Maintain diet plan.  Work on expanding variety of vegetables.  Work on gradually cutting back on starchy CHO in the diet.  5-6 meals per day.  Start including protein supplements in the diet plan daily.  Return to clinic.    Exercise: Increase as tolerated    Behavior Modification: Continue to document food & activity logs daily.

## 2019-06-05 ENCOUNTER — CLINICAL SUPPORT (OUTPATIENT)
Dept: SMOKING CESSATION | Facility: CLINIC | Age: 69
End: 2019-06-05
Payer: COMMERCIAL

## 2019-06-05 DIAGNOSIS — F17.200 NICOTINE DEPENDENCE: Primary | ICD-10-CM

## 2019-06-05 NOTE — PROGRESS NOTES
Called pt to f/u on her 6 month smoking cessation quit status. Pt stated she remains tobacco free. Congratulated her on her hard work and success. Informed her of benefit period, phone follow ups, and contact information. Will complete smart form and resolve quit #2 episode.

## 2019-06-21 ENCOUNTER — TELEPHONE (OUTPATIENT)
Dept: BARIATRICS | Facility: CLINIC | Age: 69
End: 2019-06-21

## 2019-06-21 NOTE — TELEPHONE ENCOUNTER
6/20/2019  Pt reports having bloating and nausea with all foods, unable to eat much due to this.  Also worried about records from [facility]. Spoke with pt - we have a few tests but not all of her medical record. Our MA will re-fax to get whole medical record.    6/21/2019  Spoke with mid-level about pt's bloating/nausea issue. Mid-level stated unable to give patient anything at this time, may take gasX.  Rd discussed recommendation with patient, also recommended taking yogurt daily/probiotics to help regulate bowels along with keeping foods bland until bloating/nausea improves.  Pt verbalized understanding and will remain available prn.

## 2019-06-24 ENCOUNTER — TELEPHONE (OUTPATIENT)
Dept: BARIATRICS | Facility: CLINIC | Age: 69
End: 2019-06-24

## 2019-06-24 NOTE — TELEPHONE ENCOUNTER
Spoke with ms Collier and explained to her that [facility] and [facility] never sent us any medical records so I re faxed the GEO to both locations. Waiting on entire record.

## 2019-06-24 NOTE — TELEPHONE ENCOUNTER
----- Message from Jannette Argueta sent at 6/24/2019 11:23 AM CDT -----  Contact: pt  Needs Advice    Reason for call: Pt states she called on Thursday and no one has called her back. I am not sure what the pt is stating her call is regarding. Pt would like for a nurse to contact her today.         Communication Preference: [phone]     Additional Information: please contact pt regarding this matter

## 2019-06-25 ENCOUNTER — TELEPHONE (OUTPATIENT)
Dept: BARIATRICS | Facility: CLINIC | Age: 69
End: 2019-06-25

## 2019-06-25 DIAGNOSIS — E66.01 MORBID OBESITY: Primary | ICD-10-CM

## 2019-06-25 DIAGNOSIS — I10 ESSENTIAL HYPERTENSION: ICD-10-CM

## 2019-06-25 NOTE — TELEPHONE ENCOUNTER
Left voicemail to let patient know I did receive her stress test from [facility] and still waiting on records from [facility].    Head atraumatic, normal cephalic shape.

## 2019-07-02 ENCOUNTER — TELEPHONE (OUTPATIENT)
Dept: BARIATRICS | Facility: CLINIC | Age: 69
End: 2019-07-02

## 2019-07-02 NOTE — TELEPHONE ENCOUNTER
Spoke with Ms. Pauly villalta informed her I have not received nor do I see her medical records from [facility].

## 2019-07-10 ENCOUNTER — TELEPHONE (OUTPATIENT)
Dept: BARIATRICS | Facility: CLINIC | Age: 69
End: 2019-07-10
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
## 2019-07-11 ENCOUNTER — OFFICE VISIT (OUTPATIENT)
Dept: PAIN MEDICINE | Facility: CLINIC | Age: 69
End: 2019-07-11
Attending: ANESTHESIOLOGY
Payer: MEDICARE

## 2019-07-11 ENCOUNTER — HOSPITAL ENCOUNTER (EMERGENCY)
Facility: OTHER | Age: 69
Discharge: HOME | End: 2019-07-11
Attending: EMERGENCY MEDICINE
Payer: MEDICARE

## 2019-07-11 VITALS
BODY MASS INDEX: 48.82 KG/M2 | SYSTOLIC BLOOD PRESSURE: 127 MMHG | HEIGHT: 65 IN | RESPIRATION RATE: 18 BRPM | DIASTOLIC BLOOD PRESSURE: 80 MMHG | TEMPERATURE: 98 F | WEIGHT: 293 LBS | HEART RATE: 91 BPM

## 2019-07-11 VITALS
BODY MASS INDEX: 62.4 KG/M2 | TEMPERATURE: 99 F | SYSTOLIC BLOOD PRESSURE: 131 MMHG | OXYGEN SATURATION: 95 % | DIASTOLIC BLOOD PRESSURE: 60 MMHG | WEIGHT: 293 LBS | HEART RATE: 100 BPM | RESPIRATION RATE: 16 BRPM

## 2019-07-11 DIAGNOSIS — E66.01 MORBID OBESITY WITH BMI OF 60.0-69.9, ADULT: ICD-10-CM

## 2019-07-11 DIAGNOSIS — G89.4 CHRONIC PAIN DISORDER: ICD-10-CM

## 2019-07-11 DIAGNOSIS — M25.562 CHRONIC PAIN OF LEFT KNEE: Primary | ICD-10-CM

## 2019-07-11 DIAGNOSIS — R60.9 EDEMA: ICD-10-CM

## 2019-07-11 DIAGNOSIS — R60.0 EDEMA OF LEFT LOWER EXTREMITY: Primary | ICD-10-CM

## 2019-07-11 DIAGNOSIS — G89.29 CHRONIC PAIN OF LEFT KNEE: Primary | ICD-10-CM

## 2019-07-11 DIAGNOSIS — M17.12 PRIMARY OSTEOARTHRITIS OF LEFT KNEE: ICD-10-CM

## 2019-07-11 PROCEDURE — 20610 PR DRAIN/INJECT LARGE JOINT/BURSA: ICD-10-PCS | Mod: LT,GC,S$GLB, | Performed by: ANESTHESIOLOGY

## 2019-07-11 PROCEDURE — 99999 PR PBB SHADOW E&M-EST. PATIENT-LVL III: ICD-10-PCS | Mod: PBBFAC,,, | Performed by: ANESTHESIOLOGY

## 2019-07-11 PROCEDURE — 20610 DRAIN/INJ JOINT/BURSA W/O US: CPT | Mod: LT,GC,S$GLB, | Performed by: ANESTHESIOLOGY

## 2019-07-11 PROCEDURE — 99214 OFFICE O/P EST MOD 30 MIN: CPT | Mod: 25,GC,S$GLB, | Performed by: ANESTHESIOLOGY

## 2019-07-11 PROCEDURE — 99999 PR PBB SHADOW E&M-EST. PATIENT-LVL III: CPT | Mod: PBBFAC,,, | Performed by: ANESTHESIOLOGY

## 2019-07-11 PROCEDURE — 3074F PR MOST RECENT SYSTOLIC BLOOD PRESSURE < 130 MM HG: ICD-10-PCS | Mod: CPTII,S$GLB,, | Performed by: ANESTHESIOLOGY

## 2019-07-11 PROCEDURE — 20605 DRAIN/INJ JOINT/BURSA W/O US: CPT

## 2019-07-11 PROCEDURE — 1101F PT FALLS ASSESS-DOCD LE1/YR: CPT | Mod: CPTII,S$GLB,, | Performed by: ANESTHESIOLOGY

## 2019-07-11 PROCEDURE — 3079F DIAST BP 80-89 MM HG: CPT | Mod: CPTII,S$GLB,, | Performed by: ANESTHESIOLOGY

## 2019-07-11 PROCEDURE — 1101F PR PT FALLS ASSESS DOC 0-1 FALLS W/OUT INJ PAST YR: ICD-10-PCS | Mod: CPTII,S$GLB,, | Performed by: ANESTHESIOLOGY

## 2019-07-11 PROCEDURE — 99281 EMR DPT VST MAYX REQ PHY/QHP: CPT | Mod: 25

## 2019-07-11 PROCEDURE — 3074F SYST BP LT 130 MM HG: CPT | Mod: CPTII,S$GLB,, | Performed by: ANESTHESIOLOGY

## 2019-07-11 PROCEDURE — 20610 DRAIN/INJ JOINT/BURSA W/O US: CPT

## 2019-07-11 PROCEDURE — 99214 PR OFFICE/OUTPT VISIT, EST, LEVL IV, 30-39 MIN: ICD-10-PCS | Mod: 25,GC,S$GLB, | Performed by: ANESTHESIOLOGY

## 2019-07-11 PROCEDURE — 3079F PR MOST RECENT DIASTOLIC BLOOD PRESSURE 80-89 MM HG: ICD-10-PCS | Mod: CPTII,S$GLB,, | Performed by: ANESTHESIOLOGY

## 2019-07-11 RX ORDER — LIDOCAINE HYDROCHLORIDE 10 MG/ML
1 INJECTION INFILTRATION; PERINEURAL
Status: COMPLETED | OUTPATIENT
Start: 2019-07-11 | End: 2019-07-11

## 2019-07-11 RX ORDER — BETAMETHASONE SODIUM PHOSPHATE AND BETAMETHASONE ACETATE 3; 3 MG/ML; MG/ML
3 INJECTION, SUSPENSION INTRA-ARTICULAR; INTRALESIONAL; INTRAMUSCULAR; SOFT TISSUE
Status: COMPLETED | OUTPATIENT
Start: 2019-07-11 | End: 2019-07-11

## 2019-07-11 RX ORDER — GLUCOSAM/CHONDRO/HERB 149/HYAL 750-100 MG
TABLET ORAL
Status: ON HOLD | COMMUNITY
Start: 2019-04-19 | End: 2021-02-24 | Stop reason: HOSPADM

## 2019-07-11 RX ORDER — ACETAMINOPHEN 500 MG
TABLET ORAL
COMMUNITY
Start: 2019-06-17

## 2019-07-11 RX ORDER — BUPIVACAINE HYDROCHLORIDE 2.5 MG/ML
3 INJECTION, SOLUTION EPIDURAL; INFILTRATION; INTRACAUDAL ONCE
Status: COMPLETED | OUTPATIENT
Start: 2019-07-11 | End: 2019-07-11

## 2019-07-11 RX ADMIN — LIDOCAINE HYDROCHLORIDE 1 ML: 10 INJECTION INFILTRATION; PERINEURAL at 12:07

## 2019-07-11 RX ADMIN — BETAMETHASONE SODIUM PHOSPHATE AND BETAMETHASONE ACETATE 3 MG: 3; 3 INJECTION, SUSPENSION INTRA-ARTICULAR; INTRALESIONAL; INTRAMUSCULAR; SOFT TISSUE at 12:07

## 2019-07-11 RX ADMIN — BUPIVACAINE HYDROCHLORIDE 7.5 MG: 2.5 INJECTION, SOLUTION EPIDURAL; INFILTRATION; INTRACAUDAL at 12:07

## 2019-07-11 NOTE — ED NOTES
68 year old female to ED with NOEMS for Left knee pain. Patient states this is an ongoing problem. She states she ambulates with a cane and walker but feels that the knee is becoming more unstable. Knee visualized. No swelling noted compared to other joint. Skin warm and dry. DP Pulses 2+ in bilateral extremities.

## 2019-07-11 NOTE — ED NOTES
Pt verbalizes that she wants to leave so she can make her 11:15 appointment with Dr. Rosa. Provider notified. Pt advised risk factors of leaving AMA. Pt v/u and signed AMA form

## 2019-07-11 NOTE — PROGRESS NOTES
"Subjective:     Patient ID: Ca Coats is a 68 y.o. female.    Chief Complaint: Pain    Consulted by: Self      Disclaimer: This note was generated using voice recognition software.  There may be a typographical errors that were missed during proofreading.      HPI:    Ca Coats is a 68 y.o. female who presents today with left knee pain. Her pain has been going on for "too long."  She was previously treated by Dr. Iyer at [facility].  She has injections [id number] with him.  These were helpful, but she does not know if the injections were steroid or viscosupplementation.   This pain is described in detail below.    Interval History (4/30/2018):  The patient returns to clinic today for follow up and records review. We did received records from [facility] including a MRI of her knee. Dr. Iyer's last office visit note from January 2017 does not include any previous injections. She continues to report bilateral knee pain, left greater than right. She describes this pain as constant and aching. This pain is worse with prolonged walking. She also report right shoulder pain with prolonged overhead activity. She continues to take Percocet with benefit. She denies any other health changes.    Interval History (5/31/2018):  The patient returns to clinic for follow up. She is s/p left knee Synvisc One injection on 5/8/2018. She reports 35% relief of her knee pain. She continues to report bilateral knee pain, left greater than right. She describes this pain as constant and aching. Her pain is worse with prolonged walking and standing. She is scheduled to see Orthopedics at the  End of this month. She is also following up with Bariatrics to discuss the lap band procedure. She continues to take Percocet with benefit. She also uses Voltaren gel with benefit but this is expensive for her. She denies any other health changes.         Interval History (7/2/2018):  The patient returns to clinic today for " "follow up. She continues to report left knee pain that is constant, sharp, and aching in nature. Her pain is worse with prolonged walking and standing. She is scheduled for weight loss surgery in August. She continues follow up with orthopedics who does not recommend surgery at this time due to her BMI. She continues to take Percocet as needed with benefit. She denies any other health changes.     Interval History (7/19/2018):  The patient returns to clinic today for follow up. She is s/p left genicular nerve block on 7/10/2018. She reports 80% relief of her left knee pain. She reports that she was able to walk for longer distances (3 blocks) after the block. Her pain has returned to baseline. She continues to report left knee pain that is constant, sharp, and aching in nature. Her pain is worse with prolonged walking and standing. She denies any other health changes.     Interval History (8/21/2018):  The patient returns to clinic today for follow up. She is s/p left genicular cooled RFA on 7/31/2018. She reports 60% relief of her left knee pain. She reports intermittent knee pain that is sore and aching in nature. She continues to perform a home exercise routine. She is actively trying to lose weight. She continues to follow up with Bariatric Surgery at [facility]. She is considering weight loss surgery. She continues to use compound cream with benefit. She denies any other health changes.     Interval History (11/21/2018):  The patient returns to clinic today for follow up. She reports increased left knee pain this past week. She reports 2 episodes where her knee has "locked" up on her while walking. She describes her knee pain as sore and aching. She is actively trying to lose weight and quit smoking. She continues to use the compound cream with benefit. She denies any other health changes.     Interval History (1/9/2019):  The patient returns to clinic today for follow up. She is s/p left knee steroid injection on " "12/21/2018. She reports one day of relief. She continues to report left knee pain that is constant, sharp, and aching in nature. Her pain is worse with standing and walking. She reports that her knee "locks" up on her while walking. She often feels as though she is going to fall. She is scheduled for bariatric weight loss surgery in February at [facility]. She denies any other health changes.     Interval History (2/28/2019):  The patient returns to clinic today for follow up. She is s/p Euflexxa series completed on 1/30/2019. She reports that she able to stand for longer periods of time since the procedure. She continues to report left knee pain that is sore and aching in nature. Her pain is worse with prolonged standing and walking. She was previously scheduled for weight loss surgery but reports this was canceled. She is scheduled for follow up next week about this. She denies any other health changes.     Interval History (4/12/2019):  The patient returns for f/u of left knee pain.  She is s/p left genicular cooled RFA with about 90% pain relief.  She has been able to walk easier.  She also notices less stiffness in her knee.  She is planning on gastric surgery prior to knee replacement.  She had benefit with compounding cream but it is no longer covered by her insurance.  She does take Percocet from her PCP.  Her pain today is 7/10.    Interval History (7/11/19):  Patient reported to ED today for increased L knee pain and an episode of LLE weakness that lead to a near fall. Patient is s/p L genicular RFA in 3/19/19 that provided 90% relief for 2 months then came back. Pain is a crunchy pain, in left knee, that does not radiate, worse with movement, better with rest. Pain today is 9/10. She normally uses cane to ambulate but is currently using wheelchair at hospital. Denies any trauma ot the area, fever/chills, n/v, abdominal pain, or HA.    Physical Therapy: Yes, she did this in 2018 for one month (twice a week). " She does HEP (stretching and ROM in the morning)    Non-pharmacologic Treatment:     · Ice/Heat: Heat is more helpful than ice  · TENS: Not tried  · Massage: Not tried  · Chiropractic care: Not tried  · Acupuncture: Not tried  · Other: Uses a cane         Pain Medications:         · Currently taking: Gabapentin 300 mg/600 mg    · Has tried in the past:  Ibuprofen (still takes sometimes), Voltaren gel    · Has not tried: Tylenol, Muscle relaxants, TCAs, SNRIs, Lyrica, compound topical creams    Blood thinners: None    Interventional Therapies: S1Bqedo injections by Dr. Iyer were helpful  · 5/8/2018- Left knee Synvisc One injection  · 7/10/2018- Left genicular nerve block  · 7/31/2018- Left genicular cooled RFA  · 12/21/2018- Left knee steroid injection  · 1/31/2019- Left knee Euflexxa series  · 3/19/2019 Left genicular cooled RFA- 90% relief    Relevant Surgeries: None    Affecting sleep? No    Affecting daily activities? Yes    Depressive symptoms? No          · SI/HI? No    Work status: No, on disability due to her knee    Prescription Monitoring Program database:  Reviewed and consistent with medication use as prescribed.      Last 3 PDI Scores 7/11/2019 4/12/2019 2/28/2019   Pain Disability Index (PDI) 66 [phone]   ]    GENERAL: +chills No weight loss, malaise or fevers.  HEENT:   No recent changes in vision or hearing  NECK:  Negative for lumps, no difficulty with swallowing.  RESPIRATORY:  Negative for cough, wheezing or shortness of breath, patient denies any recent URI.  CARDIOVASCULAR:  Negative for chest pain, leg swelling or palpitations.  GI:  Negative for abdominal discomfort, blood in stools or black stools or change in bowel habits.  MUSCULOSKELETAL:  See HPI.  SKIN:  Negative for lesions, rash, and itching.  PSYCH:  Negative for mood disorder or recent psychosocial stressors.    HEMATOLOGY/LYMPHOLOGY:  Negative for prolonged bleeding, bruising easily or swollen nodes.    ENDO: No history of  diabetes or thyroid dysfunction  NEURO:   No history of headaches, syncope, paralysis, seizures or tremors.  All other reviewed and negative other than HPI.          Past Medical History:   Diagnosis Date    Anemia     2018    Arthritis     BMI 60.0-69.9, adult     Cataract     Depression     Dry eye syndrome     GERD (gastroesophageal reflux disease)     Glaucoma suspect     History of gastric ulcer     Hyperlipidemia     Hypertension     Hypothyroidism     Morbid obesity     Neuromuscular disorder     Thyroid disease        Past Surgical History:   Procedure Laterality Date    ZEGUONMN-ZDNSSTVVOXSQWK-SNRBLJ Left 7/31/2018    [facility]    WZFMICKI-MBNQGAJBLDQYYL-MJXMGH, LEFT Left 3/19/2019    Performed by Priya Thacker MD at [facility]    APPENDECTOMY      BLOCK, NERVE Left 7/10/2018    [facility] at [facility]    CATARACT EXTRACTION W/  INTRAOCULAR LENS IMPLANT Bilateral     MFIOL OU    cataract surgery   2017    Injection LEFT KNEE STEROID INJECTION Left 12/21/2018    [facility] at [facility]    INJECTION-JOINT Left 5/8/2018    Performed by [facility]idal MD at [facility]       Review of patient's allergies indicates:  No Known Allergies    Current Outpatient Medications   Medication Sig Dispense Refill    amLODIPine (NORVASC) 5 MG tablet Take 5 mg by mouth once daily.       atorvastatin (LIPITOR) 40 MG tablet Take 40 mg by mouth once daily.       B-complex with vitamin C (Z-BEC OR EQUIV) tablet Take 1 tablet by mouth once daily.       CALCIUM CITRATE-VITAMIN D2 ORAL Take 1 tablet by mouth once daily.       ferrous sulfate 220 mg (44 mg iron)/5 mL solution Take 220 mg by mouth once daily.      gabapentin (NEURONTIN) 300 MG capsule Take 300 mg by mouth 2 (two) times daily.       lisinopril (PRINIVIL,ZESTRIL) 40 MG tablet Take 40 mg by mouth once daily.       multivitamin with  minerals tablet Take 1 tablet by mouth once daily.       oxyCODONE-acetaminophen (PERCOCET) 5-325 mg per tablet Take 1 tablet by mouth every 6 (six) hours as needed for Pain. 20 tablet 0    pantoprazole (PROTONIX) 40 MG tablet Take 40 mg by mouth 2 (two) times daily.       sucralfate (CARAFATE) 1 gram tablet Take 1 g by mouth 4 (four) times daily before meals and nightly.       VITAMIN B-12 5,000 mcg Subl Take 1 tablet by mouth once daily.       acetaminophen (TYLENOL) 500 MG tablet       omega 3-dha-epa-fish oil 1,000 mg (120 mg-180 mg) Cap        No current facility-administered medications for this visit.        Family History   Problem Relation Age of Onset    No Known Problems Mother     No Known Problems Father        Social History     Socioeconomic History    Marital status: Single     Spouse name: Not on file    Number of children: Not on file    Years of education: Not on file    Highest education level: Not on file   Occupational History    Not on file   Social Needs    Financial resource strain: Not on file    Food insecurity:     Worry: Not on file     Inability: Not on file    Transportation needs:     Medical: Not on file     Non-medical: Not on file   Tobacco Use    Smoking status: Former Smoker     Packs/day: 0.25     Years: 50.00     Pack years: 12.50     Types: Cigarettes     Last attempt to quit: 11/15/2018     Years since quittin.6    Smokeless tobacco: Never Used   Substance and Sexual Activity    Alcohol use: Not Currently    Drug use: No    Sexual activity: Not Currently     Partners: Male   Lifestyle    Physical activity:     Days per week: Not on file     Minutes per session: Not on file    Stress: Not on file   Relationships    Social connections:     Talks on phone: Not on file     Gets together: Not on file     Attends [employer] service: Not on file     Active member of club or organization: Not on file     Attends meetings of clubs or organizations: Not on  "file     Relationship status: Not on file   Other Topics Concern    Not on file   Social History Narrative    Not on file       Objective:     Vitals:    07/11/19 1152   BP: 127/80   Pulse: 91   Resp: 18   Temp: 98.1 °F (36.7 °C)   Weight: (!) 162 kg (357 lb 2.3 oz)   Height: 5' 5" (1.651 m)   PainSc: 10-Worst pain ever       GEN:  Well developed, well nourished.  No acute distress. No pain behavior.  HEENT:  No trauma.  Mucous membranes moist.  Nares patent bilaterally.  PSYCH: Normal affect. Thought content appropriate.  CHEST:  Breathing symmetric.  No audible wheezing.  ABD:   · Soft, non-distended.    SKIN:  Warm, pink, dry.  No rash on exposed areas.    EXT:  Swelling to BLE, left greater than right. No erythema noted TTP over lateral aspect of knee, 5/5 stregnth in BLE muscles groups, 2+ reflexes bilaterally   No color change or changes in nail or hair growth.  NEURO/MUSCULOSKELETAL:  Fully alert, oriented, and appropriate. Speech normal francesca. No cranial nerve deficits.   Gait: Uses cane, guarding of the LLE  Motor Strength: 5/5 motor strength throughout lower extremities.     Left knee  Inspection: +swelling No erythema or redness  ROM: Full with pain on extension.   Palpation: No pes anserine tenderness and positive crepitus. No patellar tendon tenderness and No patellofemoral tendon tenderness.  No instability on exam.        Imaging:      MRI Left Knee 7/21/2017 (in media):  The medial meniscus is intact. The lateral meniscus is intact.     A chronic complete ACL tear is noted. The posterior cruciate ligament is intact. The medial and lateral retinacula are intact. The medial collateral ligament is intact. The lateral collateral ligament in intact. The posterolateral corner structures are intact.     There is full thickness fissuring of the central aspect medial femoral cartilage. The lateral tibiofemoral compartment cartilage is intact. There is broad thickness defect within the central trochlear " cartilage. There is mild lateral patellofemoral cartilage thinning and regional full thickness loss of the central superior patellar cartilage.     A small effusion is present. There is trace infrapatellar fluid. Tiny popliteal cyst is noted. Subtle focal marrow edema is seen within the posterior tibial plateau. The bone marrow signal is heterogeneous likely reflecting marrow reconversion.     There is trace pes anserine bursitis. The tendons are otherwise normal.     Grade 2 fatty streaking of the semimembranosus and vastus lateralis muscles are noted. There is mild prepatellar edema.     MRI Right Shoulder 7/21/2017 (in media):   There is a complete tear of the supraspinatus tendon with retraction to the glenohumeral joint. There is partial tearing of the anterior infraspinatus articular surface. Mild subscapularis tendinosis is noted. The teres minor is intact. A small amount of subacromial/subdeltoid fluid is noted. The proximal long head biceps tendon is poorly visualized proximally although intact distally.     Degenerative tearing of the anterosuperior through posterosuperior labrum is noted. There is moderate to severe superior glenoid cartilage thinning with subchondral degenerative changes. The glenohumeral ligaments appear grossly intact.     An os acromiale is seen with fluid and osteophytosis at its junction with the base of the acromion. There is moderate superior subluxation of the AC joint with mild osteophytosis.     Small effusion with subcoracoid extension is noted.     There is grade 2/3 fatty atrophy of the supraspinatus muscle, grade 2 of the infraspinatus and subscapularis. Heterogeneous signal is seen throughout the bone marrow likely reflecting marrow reconversion.       Assessment:     Encounter Diagnoses   Name Primary?    Chronic pain of left knee Yes    Primary osteoarthritis of left knee     Chronic pain disorder     Morbid obesity with BMI of 60.0-69.9, adult        Plan:      Ca was seen today for knee pain.    Diagnoses and all orders for this visit:    Chronic pain of left knee  -     lidocaine HCL 10 mg/ml (1%) injection 1 mL  -     bupivacaine (PF) 0.25% (2.5 mg/ml) injection 7.5 mg  -     betamethasone acetate-betamethasone sodium phosphate injection 3 mg    Primary osteoarthritis of left knee  -     lidocaine HCL 10 mg/ml (1%) injection 1 mL  -     bupivacaine (PF) 0.25% (2.5 mg/ml) injection 7.5 mg  -     betamethasone acetate-betamethasone sodium phosphate injection 3 mg    Chronic pain disorder    Morbid obesity with BMI of 60.0-69.9, adult       Her pain is consistent with the above.    We discussed the assessment and recommendations.  All available images were reviewed. We discussed the disease process, prognosis, treatment plan, and risks and benefits. The patient is aware of the risks and benefits of the medications being prescribed, common side effects, and proper usage. The following is the plan we agreed on:     1. Scheduled for Phenol ablation of left genicular nerves for knee pain refractory to left genicular RFA.  2. Left Knee intra-articular injection today in clinic as noted  3. Continue weight loss strategies.  She is planning for lap band surgery prior to knee replacement.  4. Continue Percocet per PCP  5. Continue Gabapentin 300mg PO BID  6. RTC in 2 weeks after procedure    The above plan and management options were discussed at length with patient. Patient is in agreement with the above and verbalized understanding.     Date of Procedure: 7/11/19    Procedure: Left knee intra-articular injection    Pre-op diagnosis: Left knee DJD/pain    Post-op diagnosis: Same     Physician: Dr. Samantha Vidal     Assistant: Ha Schneider MD    Anesthestia: Local    EBL: None    Specimens: None    All medications, allergies, and relevant histories were reviewed. No recent antibiotics or infections.  A time-out was taken to verify the correct patient, procedure,  laterality, and appropriate medications/allergies.    Intra-articular knee injection    Pt was advised, consented and had questions answered prior to proceeding.  Pt was escorted to the procedure room and placed in the sitting position with the left knee flexed.  Sterile prep over the right knee.  Palpation was used to located the knee joint space. A 25g Needle was used to administer 2cc of bicarbonated 1% lidocaine at needle insertion site.  A 22G 1.5 inch needle was advanced from the lateral angle below the patella into the anterior portion of the knee joint.  A mixture of 3 cc of 0.5% bupivacaine with 6 mg betamethasone was injected after negative aspiration.  Needle was removed and a bandage was applied.      The patient tolerated the procedure well and was discharged in excellent condition.  No complications noted.       Ha Schneider MD     I have seen the patient with the fellow physician.  I have performed my own history and physical exam and we have come up with the above plan.  The patient is in agreement with our plan. I agree with the above note which I have edited where appropriate.     I certify that I provided the above services.  I was present for the entire procedure, which was performed by the fellow physician under my supervision.  There were no parts of the procedure that were performed not by myself or without my direct supervision.    Samantha Vidal MD  07/11/2019

## 2019-07-11 NOTE — ED PROVIDER NOTES
Encounter Date: 7/11/2019    SCRIBE #1 NOTE: I, Letty Martin, am scribing for, and in the presence of, Dr. Banegas.       History     Chief Complaint   Patient presents with    Knee Pain     acute on chronic left knee pain worsening this morning. Denies recent fall or trauma. Pt ambulatory with cane with assistance by EMS     Time seen by provider: 10:13 AM    This is a 68 y.o. female, arriving via EMS, who presents with complaint of gradual onset of worsening left knee pain, and her left knee giving out on her this morning PTA. The patient reports she was at home when the knee gave out on her but she did not completely fall on the ground because someone was there to catch her. The patient reports she is unable to stand on it. She reports she ambulates with a walker and a cane. The patient has a pain management appointment today.    The history is provided by the patient.     Review of patient's allergies indicates:  No Known Allergies  Past Medical History:   Diagnosis Date    Anemia     2018    Arthritis     BMI 60.0-69.9, adult     Cataract     Depression     Dry eye syndrome     GERD (gastroesophageal reflux disease)     Glaucoma suspect     History of gastric ulcer     Hyperlipidemia     Hypertension     Hypothyroidism     Morbid obesity     Neuromuscular disorder     Thyroid disease      Past Surgical History:   Procedure Laterality Date    GLUOGFIC-IMZBRYYVSVIOHV-FEFFZZ Left 7/31/2018    [facility] at [facility]    FTCNLSEZ-ZKRHCAEEILQMML-EBXMUD, LEFT Left 3/19/2019    [facility] by [facility]acker [facility] at [facility]    APPENDECTOMY      BLOCK, NERVE Left 7/10/2018    [facility] by [facility] at [facility]    Block, Nerve NERVE BLOCK GENICULAR WITH PHENOL 6% Left 7/19/2019    [facility] by [facility] at [facility]    CATARACT EXTRACTION W/  INTRAOCULAR LENS IMPLANT Bilateral     MFIOL OU    cataract surgery   2017    Injection LEFT  KNEE STEROID INJECTION Left 2018    [facility]    INJECTION-JOINT Left 2018    [facility] by [facility] at [facility]     Family History   Problem Relation Age of Onset    No Known Problems Mother     No Known Problems Father      Social History     Tobacco Use    Smoking status: Former Smoker     Packs/day: 0.25     Years: 50.00     Pack years: 12.50     Types: Cigarettes     Last attempt to quit: 11/15/2018     Years since quittin.7    Smokeless tobacco: Never Used   Substance Use Topics    Alcohol use: Not Currently    Drug use: No     Review of Systems   Constitutional: Negative for fever.   HENT: Negative for sore throat.    Respiratory: Negative for shortness of breath.    Cardiovascular: Negative for chest pain.   Gastrointestinal: Negative for nausea.   Genitourinary: Negative for dysuria.   Musculoskeletal: Positive for arthralgias (Left knee.).   Skin: Negative for rash.   Neurological: Negative for weakness.   Hematological: Does not bruise/bleed easily.       Physical Exam     Initial Vitals [19 0944]   BP Pulse Resp Temp SpO2   131/60 100 16 98.7 °F (37.1 °C) 95 %      MAP       --         Physical Exam    Nursing note and vitals reviewed.  Constitutional: She appears well-developed and well-nourished. She is not diaphoretic. No distress.   HENT:   Head: Normocephalic and atraumatic.   Eyes: Conjunctivae and EOM are normal. Pupils are equal, round, and reactive to light.   Neck: Normal range of motion. Neck supple.   Cardiovascular: Normal rate, regular rhythm and normal heart sounds. Exam reveals no gallop and no friction rub.    No murmur heard.  Pulmonary/Chest: Breath sounds normal. No respiratory distress. She has no wheezes. She has no rhonchi. She has no rales.   Musculoskeletal: Normal range of motion.   No trauma to left knee. No lower extremity edema. No palpable cord. No popliteal tenderness. No calf tenderness.  Strength 5/5. Left lower extremity DP/PT pulses 2+. Right lower extremity DP/PT pulses 2+. Left calf is a little bigger than right. No deformity, crepitus, or step off of left or right lower extremities. Left lower extremity with negative vagus and varus stress test. Left lower extremity with negative posterior and anterior drawer sign.   Neurological: She is alert and oriented to person, place, and time. She has normal strength. No cranial nerve deficit or sensory deficit.   Sensations intact to light touch. Strength in bilateral lower extremities 5/5.   Skin: Skin is warm and dry. No rash noted. No erythema. No pallor.   No evidence of infection, cellulitis, or abscess formation.         ED Course   Procedures  Labs Reviewed - No data to display       Imaging Results          US Lower Extremity Veins Left (No Result on File)                  Medical Decision Making:   Clinical Tests:   Radiological Study: Ordered and Reviewed            Scribe Attestation:   Scribe #1: I performed the above scribed service and the documentation accurately describes the services I performed. I attest to the accuracy of the note.    Attending Attestation:           Physician Attestation for Scribe:  Physician Attestation Statement for Scribe #1: I, Dr. Banegas, reviewed documentation, as scribed by Letty Martin in my presence, and it is both accurate and complete.         Attending ED Notes:   Emergent evaluation a 68-year-old female with left knee pain.  Patient denies trauma.  Patient is afebrile, nontoxic appearing with stable vital signs. Patient concern for possible DVT.  No clinical evidence of infection, cellulitis or abscess formation.  No deformity, crepitus or step-offs.  Ultrasound of left lower extremity is ordered.  Patient declines ultrasound.  The patient is extensively counseled on her diagnosis and treatment.  The patient discharged in good condition and directed follow-up with her PCP in the next 24-48  hours.             Clinical Impression:     1. Edema of left lower extremity    2. Edema                                   Mono Banegas MD  07/23/19 2211       Mono Banegas MD  07/29/19 1957

## 2019-07-12 ENCOUNTER — TELEPHONE (OUTPATIENT)
Dept: PAIN MEDICINE | Facility: CLINIC | Age: 69
End: 2019-07-12

## 2019-07-12 DIAGNOSIS — M17.12 PRIMARY OSTEOARTHRITIS OF LEFT KNEE: ICD-10-CM

## 2019-07-12 DIAGNOSIS — M25.562 CHRONIC PAIN OF LEFT KNEE: ICD-10-CM

## 2019-07-12 DIAGNOSIS — G89.4 CHRONIC PAIN DISORDER: Primary | ICD-10-CM

## 2019-07-12 DIAGNOSIS — G89.29 CHRONIC PAIN OF LEFT KNEE: ICD-10-CM

## 2019-07-12 DIAGNOSIS — S83.512D RUPTURE OF ANTERIOR CRUCIATE LIGAMENT OF LEFT KNEE, SUBSEQUENT ENCOUNTER: ICD-10-CM

## 2019-07-12 NOTE — TELEPHONE ENCOUNTER
Spoke with patient regarding wheelchair prescription, obtained phone and fax number for [employer],message sent to Savanna Hyatt NP

## 2019-07-12 NOTE — TELEPHONE ENCOUNTER
----- Message from Ceci Singh sent at 7/12/2019  8:05 AM CDT -----  Contact: pt  Name of Who is Calling:Noah      What is the request in detail: requesting a call back as soon as possible. Pt states that she fell and she needs a wheelchair and Olu says she needs to get an order form the doctor.Pt states everytime she gets up her knees tremble and she falls. Please call to advise      Can the clinic reply by [facility]: no      What Number to Call Back if not in [facility]:[phone]                                 all

## 2019-07-15 ENCOUNTER — HOSPITAL ENCOUNTER (OUTPATIENT)
Facility: OTHER | Age: 69
Discharge: HOME | End: 2019-07-19
Attending: EMERGENCY MEDICINE | Admitting: INTERNAL MEDICINE
Payer: MEDICARE

## 2019-07-15 DIAGNOSIS — R53.81 DEBILITY: ICD-10-CM

## 2019-07-15 DIAGNOSIS — Z91.81 AT HIGH RISK FOR INJURY RELATED TO FALL: ICD-10-CM

## 2019-07-15 DIAGNOSIS — G89.29 CHRONIC PAIN OF LEFT KNEE: ICD-10-CM

## 2019-07-15 DIAGNOSIS — E78.00 PURE HYPERCHOLESTEROLEMIA: ICD-10-CM

## 2019-07-15 DIAGNOSIS — M17.12 PRIMARY OSTEOARTHRITIS OF LEFT KNEE: Primary | ICD-10-CM

## 2019-07-15 DIAGNOSIS — M25.562 CHRONIC PAIN OF LEFT KNEE: ICD-10-CM

## 2019-07-15 DIAGNOSIS — I10 ESSENTIAL HYPERTENSION: ICD-10-CM

## 2019-07-15 DIAGNOSIS — R26.81 GAIT INSTABILITY: ICD-10-CM

## 2019-07-15 DIAGNOSIS — E66.01 MORBID OBESITY: ICD-10-CM

## 2019-07-15 DIAGNOSIS — G47.33 OBSTRUCTIVE SLEEP APNEA: ICD-10-CM

## 2019-07-15 DIAGNOSIS — E07.9 THYROID DISEASE: ICD-10-CM

## 2019-07-15 LAB
ALBUMIN SERPL BCP-MCNC: 3.6 G/DL (ref 3.5–5.2)
ALP SERPL-CCNC: 93 U/L (ref 55–135)
ALT SERPL W/O P-5'-P-CCNC: 17 U/L (ref 10–44)
ANION GAP SERPL CALC-SCNC: 9 MMOL/L (ref 8–16)
AST SERPL-CCNC: 17 U/L (ref 10–40)
BASOPHILS # BLD AUTO: 0.03 K/UL (ref 0–0.2)
BASOPHILS NFR BLD: 0.2 % (ref 0–1.9)
BILIRUB SERPL-MCNC: 0.6 MG/DL (ref 0.1–1)
BILIRUB UR QL STRIP: NEGATIVE
BUN SERPL-MCNC: 11 MG/DL (ref 8–23)
CALCIUM SERPL-MCNC: 9.5 MG/DL (ref 8.7–10.5)
CHLORIDE SERPL-SCNC: 104 MMOL/L (ref 95–110)
CLARITY UR: CLEAR
CO2 SERPL-SCNC: 25 MMOL/L (ref 23–29)
COLOR UR: YELLOW
CREAT SERPL-MCNC: 0.8 MG/DL (ref 0.5–1.4)
DIFFERENTIAL METHOD: ABNORMAL
EOSINOPHIL # BLD AUTO: 0 K/UL (ref 0–0.5)
EOSINOPHIL NFR BLD: 0.2 % (ref 0–8)
ERYTHROCYTE [DISTWIDTH] IN BLOOD BY AUTOMATED COUNT: 11.9 % (ref 11.5–14.5)
EST. GFR  (AFRICAN AMERICAN): >60 ML/MIN/1.73 M^2
EST. GFR  (NON AFRICAN AMERICAN): >60 ML/MIN/1.73 M^2
GLUCOSE SERPL-MCNC: 90 MG/DL (ref 70–110)
GLUCOSE UR QL STRIP: NEGATIVE
HCT VFR BLD AUTO: 37.9 % (ref 37–48.5)
HGB BLD-MCNC: 12.2 G/DL (ref 12–16)
HGB UR QL STRIP: NEGATIVE
IMM GRANULOCYTES # BLD AUTO: 0.07 K/UL (ref 0–0.04)
IMM GRANULOCYTES NFR BLD AUTO: 0.4 % (ref 0–0.5)
INR PPP: 0.9 (ref 0.8–1.2)
KETONES UR QL STRIP: NEGATIVE
LEUKOCYTE ESTERASE UR QL STRIP: NEGATIVE
LYMPHOCYTES # BLD AUTO: 3.8 K/UL (ref 1–4.8)
LYMPHOCYTES NFR BLD: 23.4 % (ref 18–48)
MCH RBC QN AUTO: 30.9 PG (ref 27–31)
MCHC RBC AUTO-ENTMCNC: 32.2 G/DL (ref 32–36)
MCV RBC AUTO: 96 FL (ref 82–98)
MONOCYTES # BLD AUTO: 1 K/UL (ref 0.3–1)
MONOCYTES NFR BLD: 6.3 % (ref 4–15)
NEUTROPHILS # BLD AUTO: 11.2 K/UL (ref 1.8–7.7)
NEUTROPHILS NFR BLD: 69.5 % (ref 38–73)
NITRITE UR QL STRIP: NEGATIVE
NRBC BLD-RTO: 0 /100 WBC
PH UR STRIP: 6 [PH] (ref 5–8)
PLATELET # BLD AUTO: 268 K/UL (ref 150–350)
PMV BLD AUTO: 10.4 FL (ref 9.2–12.9)
POTASSIUM SERPL-SCNC: 4.1 MMOL/L (ref 3.5–5.1)
PROT SERPL-MCNC: 7 G/DL (ref 6–8.4)
PROT UR QL STRIP: NEGATIVE
PROTHROMBIN TIME: 10.4 SEC (ref 9–12.5)
RBC # BLD AUTO: 3.95 M/UL (ref 4–5.4)
SODIUM SERPL-SCNC: 138 MMOL/L (ref 136–145)
SP GR UR STRIP: <=1.005 (ref 1–1.03)
URN SPEC COLLECT METH UR: ABNORMAL
UROBILINOGEN UR STRIP-ACNC: NEGATIVE EU/DL
WBC # BLD AUTO: 16.16 K/UL (ref 3.9–12.7)

## 2019-07-15 PROCEDURE — 99285 EMERGENCY DEPT VISIT HI MDM: CPT | Mod: 25

## 2019-07-15 PROCEDURE — 85025 COMPLETE CBC W/AUTO DIFF WBC: CPT

## 2019-07-15 PROCEDURE — 99220 PR INITIAL OBSERVATION CARE,LEVL III: ICD-10-PCS | Mod: ,,, | Performed by: PHYSICIAN ASSISTANT

## 2019-07-15 PROCEDURE — 85610 PROTHROMBIN TIME: CPT

## 2019-07-15 PROCEDURE — G0378 HOSPITAL OBSERVATION PER HR: HCPCS

## 2019-07-15 PROCEDURE — 25000003 PHARM REV CODE 250: Performed by: PHYSICIAN ASSISTANT

## 2019-07-15 PROCEDURE — 80053 COMPREHEN METABOLIC PANEL: CPT

## 2019-07-15 PROCEDURE — 63600175 PHARM REV CODE 636 W HCPCS: Performed by: PHYSICIAN ASSISTANT

## 2019-07-15 PROCEDURE — 99220 PR INITIAL OBSERVATION CARE,LEVL III: CPT | Mod: ,,, | Performed by: PHYSICIAN ASSISTANT

## 2019-07-15 PROCEDURE — 81003 URINALYSIS AUTO W/O SCOPE: CPT

## 2019-07-15 RX ORDER — GABAPENTIN 300 MG/1
300 CAPSULE ORAL 2 TIMES DAILY
Status: DISCONTINUED | OUTPATIENT
Start: 2019-07-15 | End: 2019-07-19 | Stop reason: HOSPADM

## 2019-07-15 RX ORDER — SODIUM CHLORIDE 0.9 % (FLUSH) 0.9 %
10 SYRINGE (ML) INJECTION
Status: DISCONTINUED | OUTPATIENT
Start: 2019-07-15 | End: 2019-07-16

## 2019-07-15 RX ORDER — PANTOPRAZOLE SODIUM 40 MG/1
40 TABLET, DELAYED RELEASE ORAL 2 TIMES DAILY
Status: DISCONTINUED | OUTPATIENT
Start: 2019-07-15 | End: 2019-07-19 | Stop reason: HOSPADM

## 2019-07-15 RX ORDER — SODIUM CHLORIDE 0.9 % (FLUSH) 0.9 %
10 SYRINGE (ML) INJECTION
Status: DISCONTINUED | OUTPATIENT
Start: 2019-07-15 | End: 2019-07-19 | Stop reason: HOSPADM

## 2019-07-15 RX ORDER — LISINOPRIL 20 MG/1
40 TABLET ORAL DAILY
Status: DISCONTINUED | OUTPATIENT
Start: 2019-07-16 | End: 2019-07-17

## 2019-07-15 RX ORDER — FERROUS SULFATE 325(65) MG
325 TABLET, DELAYED RELEASE (ENTERIC COATED) ORAL DAILY
Status: DISCONTINUED | OUTPATIENT
Start: 2019-07-16 | End: 2019-07-19 | Stop reason: HOSPADM

## 2019-07-15 RX ORDER — SUCRALFATE 1 G/1
1 TABLET ORAL
Status: DISCONTINUED | OUTPATIENT
Start: 2019-07-15 | End: 2019-07-19 | Stop reason: HOSPADM

## 2019-07-15 RX ORDER — ACETAMINOPHEN 325 MG/1
650 TABLET ORAL EVERY 4 HOURS PRN
Status: DISCONTINUED | OUTPATIENT
Start: 2019-07-15 | End: 2019-07-19 | Stop reason: HOSPADM

## 2019-07-15 RX ORDER — OXYCODONE AND ACETAMINOPHEN 5; 325 MG/1; MG/1
1 TABLET ORAL EVERY 6 HOURS PRN
Status: DISCONTINUED | OUTPATIENT
Start: 2019-07-15 | End: 2019-07-19 | Stop reason: HOSPADM

## 2019-07-15 RX ORDER — ONDANSETRON 2 MG/ML
4 INJECTION INTRAMUSCULAR; INTRAVENOUS EVERY 8 HOURS PRN
Status: DISCONTINUED | OUTPATIENT
Start: 2019-07-15 | End: 2019-07-19 | Stop reason: HOSPADM

## 2019-07-15 RX ORDER — ENOXAPARIN SODIUM 100 MG/ML
40 INJECTION SUBCUTANEOUS EVERY 12 HOURS
Status: DISCONTINUED | OUTPATIENT
Start: 2019-07-15 | End: 2019-07-19 | Stop reason: HOSPADM

## 2019-07-15 RX ORDER — AMLODIPINE BESYLATE 5 MG/1
5 TABLET ORAL DAILY
Status: DISCONTINUED | OUTPATIENT
Start: 2019-07-16 | End: 2019-07-19 | Stop reason: HOSPADM

## 2019-07-15 RX ORDER — ATORVASTATIN CALCIUM 20 MG/1
40 TABLET, FILM COATED ORAL DAILY
Status: DISCONTINUED | OUTPATIENT
Start: 2019-07-16 | End: 2019-07-19 | Stop reason: HOSPADM

## 2019-07-15 RX ORDER — FERROUS SULFATE, DRIED 160(50) MG
1 TABLET, EXTENDED RELEASE ORAL DAILY
Status: DISCONTINUED | OUTPATIENT
Start: 2019-07-16 | End: 2019-07-19 | Stop reason: HOSPADM

## 2019-07-15 RX ADMIN — ENOXAPARIN SODIUM 40 MG: 100 INJECTION SUBCUTANEOUS at 09:07

## 2019-07-15 RX ADMIN — OXYCODONE HYDROCHLORIDE AND ACETAMINOPHEN 1 TABLET: 5; 325 TABLET ORAL at 07:07

## 2019-07-15 RX ADMIN — PANTOPRAZOLE SODIUM 40 MG: 40 TABLET, DELAYED RELEASE ORAL at 09:07

## 2019-07-15 RX ADMIN — SUCRALFATE 1 G: 1 TABLET ORAL at 05:07

## 2019-07-15 RX ADMIN — SUCRALFATE 1 G: 1 TABLET ORAL at 09:07

## 2019-07-15 RX ADMIN — GABAPENTIN 300 MG: 300 CAPSULE ORAL at 09:07

## 2019-07-15 NOTE — H&P
"[facility] Medicine  History & Physical    Patient Name: Ca Coats  MRN: [id number]  Admission Date: 7/15/2019  Attending Physician: Lucrecia Meyers MD   Primary Care Provider: Primary Doctor No         Patient information was obtained from patient, past medical records and ER records.     Subjective:     Principal Problem:Chronic pain of left knee    Chief Complaint:   Chief Complaint   Patient presents with    Knee Pain     pt complaining of left knee pain  states she got up today and felt like her knee was going to give out and fell to the floor.  No other injuries states she has an appointment Friday to have the nerve frozen.  states she cannot stay at home by herself she keeps falling on the floor        HPI: 69 y/o female with Hx of HTN, morbid obesity, LAURA on CPAP, Depression, Hypothyroidism, chronic knee pain followed by Pain management presents to ED with c/o fall today. States she has chronic left knee pain, and felt her knee "give out".  Patient states she has been having multiple falls due to her knee. Reports she has home health but no one is at home with her. Reports that she is afraid she is going to fall and hurt herself. Reports she calls 911 whenever she falls. She doesn't fell safe going home. Denies LOC, fever, chills, CP, SOB, N/V/D, dysuria, numbness.  Per ED paramedics stated that her house is extremely small difficult to get around. Admitted to OBS for PT/OT, placement.        Past Medical History:   Diagnosis Date    Anemia     2018    Arthritis     BMI 60.0-69.9, adult     Cataract     Depression     Dry eye syndrome     GERD (gastroesophageal reflux disease)     Glaucoma suspect     History of gastric ulcer     Hyperlipidemia     Hypertension     Hypothyroidism     Morbid obesity     Neuromuscular disorder     Thyroid disease        Past Surgical History:   Procedure Laterality Date    TIHJKYIR-SAZQCPRSZPVBQV-IWLTJF Left 7/31/2018 "    [facility]    CCCEYKCO-IQLGNONGGRSSUO-PVZJUT, LEFT Left 3/19/2019    [facility]    APPENDECTOMY      BLOCK, NERVE Left 7/10/2018    [facility]    CATARACT EXTRACTION W/  INTRAOCULAR LENS IMPLANT Bilateral     MFIOL OU    cataract surgery   2017    Injection LEFT KNEE STEROID INJECTION Left 12/21/2018    [facility]    INJECTION-JOINT Left 5/8/2018    Per[facility] by [facility] at [facility]       Review of patient's allergies indicates:  No Known Allergies    No current facility-administered medications on file prior to encounter.      Current Outpatient Medications on File Prior to Encounter   Medication Sig    acetaminophen (TYLENOL) 500 MG tablet     amLODIPine (NORVASC) 5 MG tablet Take 5 mg by mouth once daily.     atorvastatin (LIPITOR) 40 MG tablet Take 40 mg by mouth once daily.     B-complex with vitamin C (Z-BEC OR EQUIV) tablet Take 1 tablet by mouth once daily.     CALCIUM CITRATE-VITAMIN D2 ORAL Take 1 tablet by mouth once daily.     ferrous sulfate 220 mg (44 mg iron)/5 mL solution Take 220 mg by mouth once daily.    gabapentin (NEURONTIN) 300 MG capsule Take 300 mg by mouth 2 (two) times daily.     lisinopril (PRINIVIL,ZESTRIL) 40 MG tablet Take 40 mg by mouth once daily.     multivitamin with minerals tablet Take 1 tablet by mouth once daily.     omega 3-dha-epa-fish oil 1,000 mg (120 mg-180 mg) Cap     oxyCODONE-acetaminophen (PERCOCET) 5-325 mg per tablet Take 1 tablet by mouth every 6 (six) hours as needed for Pain.    pantoprazole (PROTONIX) 40 MG tablet Take 40 mg by mouth 2 (two) times daily.     sucralfate (CARAFATE) 1 gram tablet Take 1 g by mouth 4 (four) times daily before meals and nightly.     VITAMIN B-12 5,000 mcg Subl Take 1 tablet by mouth once daily.      Family History     Problem Relation (Age  of Onset)    No Known Problems Mother, Father        Tobacco Use    Smoking status: Former Smoker     Packs/day: 0.25     Years: 50.00     Pack years: 12.50     Types: Cigarettes     Last attempt to quit: 11/15/2018     Years since quittin.6    Smokeless tobacco: Never Used   Substance and Sexual Activity    Alcohol use: Not Currently    Drug use: No    Sexual activity: Not Currently     Partners: Male     Review of Systems   Constitutional: Positive for activity change. Negative for chills, fatigue and fever.   HENT: Negative for congestion and rhinorrhea.    Eyes: Negative.    Respiratory: Negative for cough and shortness of breath.    Cardiovascular: Negative for chest pain, palpitations and leg swelling.   Gastrointestinal: Negative for abdominal distention, abdominal pain, diarrhea, nausea and vomiting.   Endocrine: Negative.    Genitourinary: Negative for difficulty urinating and frequency.   Musculoskeletal: Positive for arthralgias. Negative for back pain and neck pain.   Skin: Negative.    Neurological: Negative for dizziness, weakness, light-headedness, numbness and headaches.   Psychiatric/Behavioral: Negative for agitation and behavioral problems.     Objective:     Vital Signs (Most Recent):  Temp: 99.6 °F (37.6 °C) (07/15/19 1456)  Pulse: 80 (07/15/19 1700)  Resp: 16 (07/15/19 1456)  BP: 135/65 (07/15/19 1649)  SpO2: 97 % (07/15/19 1700) Vital Signs (24h Range):  Temp:  [99.6 °F (37.6 °C)] 99.6 °F (37.6 °C)  Pulse:  [] 80  Resp:  [16] 16  SpO2:  [96 %-97 %] 97 %  BP: (122-142)/(65-85) 135/65     Weight: 119.3 kg (263 lb)  Body mass index is 42.45 kg/m².    Physical Exam   Constitutional: She is oriented to person, place, and time. She appears well-developed and well-nourished. No distress.   Morbidly obese female   HENT:   Head: Normocephalic and atraumatic.   Eyes: Pupils are equal, round, and reactive to light. Conjunctivae are normal. Right eye exhibits no discharge. Left eye exhibits  no discharge.   Neck: Normal range of motion. Neck supple.   Cardiovascular: Normal rate, regular rhythm, normal heart sounds and intact distal pulses.   Pulmonary/Chest: Effort normal and breath sounds normal. No respiratory distress.   Abdominal: Soft. Bowel sounds are normal. She exhibits no distension. There is no tenderness. There is no guarding.   obese   Musculoskeletal: She exhibits no edema.   Neurological: She is alert and oriented to person, place, and time.   Skin: Skin is warm and dry. Capillary refill takes less than 2 seconds. She is not diaphoretic.   Psychiatric: She has a normal mood and affect. Her behavior is normal. Judgment and thought content normal.   Nursing note and vitals reviewed.        CRANIAL NERVES     CN III, IV, VI   Pupils are equal, round, and reactive to light.       Significant Labs:   CBC:   Recent Labs   Lab 07/15/19  1618   WBC 16.16*   HGB 12.2   HCT 37.9        CMP:   Recent Labs   Lab 07/15/19  1618      K 4.1      CO2 25   GLU 90   BUN 11   CREATININE 0.8   CALCIUM 9.5   PROT 7.0   ALBUMIN 3.6   BILITOT 0.6   ALKPHOS 93   AST 17   ALT 17   ANIONGAP 9   EGFRNONAA >60     All pertinent labs within the past 24 hours have been reviewed.    Significant Imaging:    Imaging Results    None           Assessment/Plan:     * Chronic pain of left knee  - patient with Hx of multiple interventions; per EMR s/p L genicular RFA in 3/19/19 that provided 90% relief for 2 months then came back with most recent Left knee intra-articular injection 7/11/2019 and is scheduled for Phenol ablation of left genicular nerves for knee pain refractory to left genicular RFA.  - Continue Gabapentin 300mg PO BID  - Continue Percocet home dose  - PT/OT    Debility  - patient morbidly obese with chronic b/l knee pain unable to care for herself; states she has an evaluation for bariatric surgery needs to lose weight to have knee surgery  - PT/OT  - senior living vs SNF placement      Morbid  obesity  - patient being evaluated for Bariatric Surgery      Hypothyroidism  - states she no longer is on medication  -   Lab Results   Component Value Date    TSH 0.830 05/13/2019         Hyperlipidemia  - cont statin      Essential hypertension  - currently normotensive   - resume home medications  - amlodipine 5 mg, lisinopril 40 mg  - monitor      Obstructive sleep apnea  - CPAP with sleep      VTE Risk Mitigation (From admission, onward)        Ordered     enoxaparin injection 40 mg  Every 12 hours      07/15/19 1714     IP VTE HIGH RISK PATIENT  Once      07/15/19 1714     Place sequential compression device  Until discontinued      07/15/19 1714             Jagruti Gannon PA-C  Department of Hospital Medicine   [facility]

## 2019-07-15 NOTE — ASSESSMENT & PLAN NOTE
- states she no longer is on medication  -   Lab Results   Component Value Date    TSH 0.830 05/13/2019

## 2019-07-15 NOTE — ASSESSMENT & PLAN NOTE
- patient with Hx of multiple interventions; per EMR s/p L genicular RFA in 3/19/19 that provided 90% relief for 2 months then came back with most recent Left knee intra-articular injection 7/11/2019 and is scheduled for Phenol ablation of left genicular nerves for knee pain refractory to left genicular RFA.  - Continue Gabapentin 300mg PO BID  - Continue Percocet home dose  - PT/OT

## 2019-07-15 NOTE — ASSESSMENT & PLAN NOTE
- currently normotensive   - resume home medications  - amlodipine 5 mg, lisinopril 40 mg  - monitor

## 2019-07-15 NOTE — SUBJECTIVE & OBJECTIVE
Past Medical History:   Diagnosis Date    Anemia     2018    Arthritis     BMI 60.0-69.9, adult     Cataract     Depression     Dry eye syndrome     GERD (gastroesophageal reflux disease)     Glaucoma suspect     History of gastric ulcer     Hyperlipidemia     Hypertension     Hypothyroidism     Morbid obesity     Neuromuscular disorder     Thyroid disease        Past Surgical History:   Procedure Laterality Date    PORPNGFY-VOQQNALEDDQPHD-TFAKEM Left 7/31/2018    [facility]    UTPFFFNR-GSRDQXROCBSGDG-WAZALA, LEFT Left 3/19/2019    [facility]    APPENDECTOMY      BLOCK, NERVE Left 7/10/2018    [facility]    CATARACT EXTRACTION W/  INTRAOCULAR LENS IMPLANT Bilateral     MFIOL OU    cataract surgery   2017    Injection LEFT KNEE STEROID INJECTION Left 12/21/2018    [facility]    INJECTION-JOINT Left 5/8/2018    [facility] by [facility] at [facility]       Review of patient's allergies indicates:  No Known Allergies    No current facility-administered medications on file prior to encounter.      Current Outpatient Medications on File Prior to Encounter   Medication Sig    acetaminophen (TYLENOL) 500 MG tablet     amLODIPine (NORVASC) 5 MG tablet Take 5 mg by mouth once daily.     atorvastatin (LIPITOR) 40 MG tablet Take 40 mg by mouth once daily.     B-complex with vitamin C (Z-BEC OR EQUIV) tablet Take 1 tablet by mouth once daily.     CALCIUM CITRATE-VITAMIN D2 ORAL Take 1 tablet by mouth once daily.     ferrous sulfate 220 mg (44 mg iron)/5 mL solution Take 220 mg by mouth once daily.    gabapentin (NEURONTIN) 300 MG capsule Take 300 mg by mouth 2 (two) times daily.     lisinopril (PRINIVIL,ZESTRIL) 40 MG tablet Take 40 mg by mouth once daily.     multivitamin with minerals tablet Take 1 tablet by mouth once daily.      omega 3-dha-epa-fish oil 1,000 mg (120 mg-180 mg) Cap     oxyCODONE-acetaminophen (PERCOCET) 5-325 mg per tablet Take 1 tablet by mouth every 6 (six) hours as needed for Pain.    pantoprazole (PROTONIX) 40 MG tablet Take 40 mg by mouth 2 (two) times daily.     sucralfate (CARAFATE) 1 gram tablet Take 1 g by mouth 4 (four) times daily before meals and nightly.     VITAMIN B-12 5,000 mcg Subl Take 1 tablet by mouth once daily.      Family History     Problem Relation (Age of Onset)    No Known Problems Mother, Father        Tobacco Use    Smoking status: Former Smoker     Packs/day: 0.25     Years: 50.00     Pack years: 12.50     Types: Cigarettes     Last attempt to quit: 11/15/2018     Years since quittin.6    Smokeless tobacco: Never Used   Substance and Sexual Activity    Alcohol use: Not Currently    Drug use: No    Sexual activity: Not Currently     Partners: Male     Review of Systems   Constitutional: Positive for activity change. Negative for chills, fatigue and fever.   HENT: Negative for congestion and rhinorrhea.    Eyes: Negative.    Respiratory: Negative for cough and shortness of breath.    Cardiovascular: Negative for chest pain, palpitations and leg swelling.   Gastrointestinal: Negative for abdominal distention, abdominal pain, diarrhea, nausea and vomiting.   Endocrine: Negative.    Genitourinary: Negative for difficulty urinating and frequency.   Musculoskeletal: Positive for arthralgias. Negative for back pain and neck pain.   Skin: Negative.    Neurological: Negative for dizziness, weakness, light-headedness, numbness and headaches.   Psychiatric/Behavioral: Negative for agitation and behavioral problems.     Objective:     Vital Signs (Most Recent):  Temp: 99.6 °F (37.6 °C) (07/15/19 1456)  Pulse: 80 (07/15/19 1700)  Resp: 16 (07/15/19 1456)  BP: 135/65 (07/15/19 1649)  SpO2: 97 % (07/15/19 1700) Vital Signs (24h Range):  Temp:  [99.6 °F (37.6 °C)] 99.6 °F (37.6 °C)  Pulse:   [] 80  Resp:  [16] 16  SpO2:  [96 %-97 %] 97 %  BP: (122-142)/(65-85) 135/65     Weight: 119.3 kg (263 lb)  Body mass index is 42.45 kg/m².    Physical Exam   Constitutional: She is oriented to person, place, and time. She appears well-developed and well-nourished. No distress.   Morbidly obese female   HENT:   Head: Normocephalic and atraumatic.   Eyes: Pupils are equal, round, and reactive to light. Conjunctivae are normal. Right eye exhibits no discharge. Left eye exhibits no discharge.   Neck: Normal range of motion. Neck supple.   Cardiovascular: Normal rate, regular rhythm, normal heart sounds and intact distal pulses.   Pulmonary/Chest: Effort normal and breath sounds normal. No respiratory distress.   Abdominal: Soft. Bowel sounds are normal. She exhibits no distension. There is no tenderness. There is no guarding.   obese   Musculoskeletal: She exhibits no edema.   Neurological: She is alert and oriented to person, place, and time.   Skin: Skin is warm and dry. Capillary refill takes less than 2 seconds. She is not diaphoretic.   Psychiatric: She has a normal mood and affect. Her behavior is normal. Judgment and thought content normal.   Nursing note and vitals reviewed.        CRANIAL NERVES     CN III, IV, VI   Pupils are equal, round, and reactive to light.       Significant Labs:   CBC:   Recent Labs   Lab 07/15/19  1618   WBC 16.16*   HGB 12.2   HCT 37.9        CMP:   Recent Labs   Lab 07/15/19  1618      K 4.1      CO2 25   GLU 90   BUN 11   CREATININE 0.8   CALCIUM 9.5   PROT 7.0   ALBUMIN 3.6   BILITOT 0.6   ALKPHOS 93   AST 17   ALT 17   ANIONGAP 9   EGFRNONAA >60     All pertinent labs within the past 24 hours have been reviewed.    Significant Imaging:    Imaging Results    None

## 2019-07-15 NOTE — PLAN OF CARE
CM met with pt to discuss discharge planning.    Pt states she is unable to care for herself any longer, and she just calls 911 when she falls.    CM discussed nursing home placement, pt is in agreement with plan.    Dr. Saucedo informed and will admit the pt to obs status for NH placement.

## 2019-07-15 NOTE — ED TRIAGE NOTES
"Pt to ED with history of chronic L knee pain that is unresolved x 2 years. Reports her knee has been giving out and she then falls while home alone. " I can't be home alone, I am afraid I will fall and hurt myself." denies any other complaints. AAO X 3, answers questions appropriately, appears in no acute distress. Placed on continuous pulse ox, BP cycling q 30.   "

## 2019-07-15 NOTE — HPI
"Per JARET Jack:  69 y/o female with Hx of HTN, morbid obesity, LAURA on CPAP, Depression, Hypothyroidism, chronic knee pain followed by Pain management presents to ED with c/o fall today. States she has chronic left knee pain, and felt her knee "give out".  Patient states she has been having multiple falls due to her knee. Reports she has home health but no one is at home with her. Reports that she is afraid she is going to fall and hurt herself. Reports she calls 911 whenever she falls. She doesn't fell safe going home. Denies LOC, fever, chills, CP, SOB, N/V/D, dysuria, numbness.  Per ED paramedics stated that her house is extremely small difficult to get around. Admitted to OBS for PT/OT, placement.      "

## 2019-07-15 NOTE — NURSING
Transferred from ER to OBS- via stretcher by escort . Patient transferred from stretcher to bed with assistance. Patient id verified, fall and allergy band in place. Patient AAOx4. Patient oriented to room and call bell system. Patient able to voice use of call bell system. Patient call bell placed within reach of patient. Assessed patient and vitals, respirations even and unlabored. Patient assessed for pain using pain scale located in patient room. Monitor patient for facial grimacing and or any guarding. Patient rate pain to left knee 10 out of 10 on pain scale.   Will provide patient with PRN pain medication as needed. HOB elevated to 45 degrees for lung expansion. Bed locked and in lowest possible position, condition stable, will continue to monitor patient. Amaya Lange

## 2019-07-15 NOTE — ED PROVIDER NOTES
Encounter Date: 7/15/2019    SCRIBE #1 NOTE: I, Jessica Hernadez, am scribing for, and in the presence of, Dr. Saucedo.       History     Chief Complaint   Patient presents with    Knee Pain     pt complaining of left knee pain  states she got up today and felt like her knee was going to give out and fell to the floor.  No other injuries states she has an appointment Friday to have the nerve frozen.  states she cannot stay at home by herself she keeps falling on the floor     Time seen by provider: 3:06 PM    This is a 68 y.o. female, with history of HTN, HLD, anemia, and hypothyroidism, who presents to the ED via EMS with complaint of chronic knee pain that has is still chronic. Patient states she felt her knee give out. Patient has been having chronic knee pain for greater than two years. Patient has no other injuries or complaints. Patient was seen by pain management four days ago.  Patient states she is now having multiple falls.  She does have home health who is here with her and is not helping.  Paramedics state that her house is extremely small and she has the inability to get around.  Denies any fevers or chills.          Review of patient's allergies indicates:  No Known Allergies  Past Medical History:   Diagnosis Date    Anemia     2018    Arthritis     BMI 60.0-69.9, adult     Cataract     Depression     Dry eye syndrome     GERD (gastroesophageal reflux disease)     Glaucoma suspect     History of gastric ulcer     Hyperlipidemia     Hypertension     Hypothyroidism     Morbid obesity     Neuromuscular disorder     Thyroid disease      Past Surgical History:   Procedure Laterality Date    SADIABKH-ZWLVEUPWAJFKLH-PIDCSQ Left 7/31/2018    [facility] at [facility]    RWLTWSCB-TFUNDCNSLHFNXN-VOHZYC, LEFT Left 3/19/2019    Per[facility] by [facility]ya [facility]acker [facility] at [facility]    APPENDECTOMY      BLOCK, NERVE Left 7/10/2018    [facility]  PAIN MGT    CATARACT EXTRACTION W/  INTRAOCULAR LENS IMPLANT Bilateral     MFIOL OU    cataract surgery   2017    Injection LEFT KNEE STEROID INJECTION Left 2018    [facility] at [facility] PAIN MGT    INJECTION-JOINT Left 2018    Performed by [facility] at [facility] PAIN MGT     Family History   Problem Relation Age of Onset    No Known Problems Mother     No Known Problems Father      Social History     Tobacco Use    Smoking status: Former Smoker     Packs/day: 0.25     Years: 50.00     Pack years: 12.50     Types: Cigarettes     Last attempt to quit: 11/15/2018     Years since quittin.6    Smokeless tobacco: Never Used   Substance Use Topics    Alcohol use: Not Currently    Drug use: No     Review of Systems   Constitutional: Negative for fever.   HENT: Negative for sore throat.    Respiratory: Negative for shortness of breath.    Cardiovascular: Negative for chest pain.   Gastrointestinal: Negative for nausea.   Genitourinary: Negative for dysuria.   Musculoskeletal: Positive for arthralgias (chronic left knee pain). Negative for back pain.   Skin: Negative for rash.   Neurological: Negative for weakness.   Hematological: Does not bruise/bleed easily.       Physical Exam     Initial Vitals [07/15/19 1456]   BP Pulse Resp Temp SpO2   (!) 142/67 108 16 99.6 °F (37.6 °C) 96 %      MAP       --         Physical Exam    Nursing note and vitals reviewed.  Constitutional: She appears well-developed and well-nourished. She is not diaphoretic. No distress.   Obese.   HENT:   Head: Normocephalic and atraumatic.   Eyes: Conjunctivae and EOM are normal. No scleral icterus.   Neck: Normal range of motion. Neck supple.   Cardiovascular: Normal rate, regular rhythm and normal heart sounds. Exam reveals no gallop and no friction rub.    No murmur heard.  Pulmonary/Chest: Breath sounds normal. No respiratory distress. She has no wheezes. She has no rhonchi. She has no rales.    Abdominal: Soft. Bowel sounds are normal. She exhibits no distension. There is no tenderness. There is no rebound and no guarding.   Musculoskeletal: Normal range of motion. She exhibits tenderness. She exhibits no edema.   No edema to the knees bilateral. Crepitus of the left knee with tenderness to palpation. Knees are normal in size.   Lymphadenopathy:     She has no cervical adenopathy.   Neurological: She is alert and oriented to person, place, and time.   Skin: Skin is warm and dry. No rash noted. No erythema. No pallor.   Psychiatric: She has a normal mood and affect. Her behavior is normal. Judgment and thought content normal.         ED Course   Procedures  Labs Reviewed   CBC W/ AUTO DIFFERENTIAL   COMPREHENSIVE METABOLIC PANEL   PROTIME-INR   URINALYSIS, REFLEX TO URINE CULTURE          Imaging Results    None          Medical Decision Making:   History:   Old Medical Records: I decided to obtain old medical records.  Old Records Summarized: records from clinic visits.       <> Summary of Records: Patient as seen by pain management four days ago, tried ice and heat, uses a cane, takes gabapentin and sometimes uses ibuprofen. Chronic knee pain dates back to 2017. Patient had an MRI of left knee on 7/21/2017 that showed complete ACL tear.    Interventional Therapies: U2Qyjtc injections by Dr. Iyer were helpful  · 5/8/2018- Left knee Synvisc One injection  · 7/10/2018- Left genicular nerve block  · 7/31/2018- Left genicular cooled RFA  · 12/21/2018- Left knee steroid injection  · 1/31/2019- Left knee Euflexxa series  · 3/19/2019 Left genicular cooled RFA- 90% relief  ED Management:  68-year-old female with chronic knee pain and morbidly obese presents with I can be home by myself anymore.  She has no family or friends that can live with her.  I do not feel sending the patient home is the safety as I feel she is high risk for fall and head injury or other injuries.  For this reason will admit to  observation for assistance with skilled nursing facility placement and PT OT evaluation.    Spoke with Hospital Medicine and Dr. beck who agrees with plan of care.    This is the extent to the patients complaints today here in the emergency department.            Scribe Attestation:   Scribe #1: I performed the above scribed service and the documentation accurately describes the services I performed. I attest to the accuracy of the note.    Attending Attestation:           Physician Attestation for Scribe:  Physician Attestation Statement for Scribe #1: I, Dr. Saucedo, reviewed documentation, as scribed by Jessica Hernadez in my presence, and it is both accurate and complete.                    Clinical Impression:       ICD-10-CM ICD-9-CM   1. Chronic pain of left knee M25.562 719.46    G89.29 338.29   2. At high risk for injury related to fall Z91.81 V49.89                                Ronny Saucedo, DO  07/15/19 1608

## 2019-07-15 NOTE — ASSESSMENT & PLAN NOTE
- patient morbidly obese with chronic b/l knee pain unable to care for herself; states she has an evaluation for bariatric surgery needs to lose weight to have knee surgery  - PT/OT  - half-way vs SNF placement

## 2019-07-16 PROCEDURE — 97535 SELF CARE MNGMENT TRAINING: CPT

## 2019-07-16 PROCEDURE — 97161 PT EVAL LOW COMPLEX 20 MIN: CPT

## 2019-07-16 PROCEDURE — 99900035 HC TECH TIME PER 15 MIN (STAT)

## 2019-07-16 PROCEDURE — 99226 PR SUBSEQUENT OBSERVATION CARE,LEVEL III: CPT | Mod: ,,, | Performed by: NURSE PRACTITIONER

## 2019-07-16 PROCEDURE — 30200315 PPD INTRADERMAL TEST REV CODE 302: Performed by: HOSPITALIST

## 2019-07-16 PROCEDURE — 94660 CPAP INITIATION&MGMT: CPT

## 2019-07-16 PROCEDURE — G0378 HOSPITAL OBSERVATION PER HR: HCPCS

## 2019-07-16 PROCEDURE — 94761 N-INVAS EAR/PLS OXIMETRY MLT: CPT

## 2019-07-16 PROCEDURE — 99226 PR SUBSEQUENT OBSERVATION CARE,LEVEL III: ICD-10-PCS | Mod: ,,, | Performed by: NURSE PRACTITIONER

## 2019-07-16 PROCEDURE — 27000190 HC CPAP FULL FACE MASK W/VALVE

## 2019-07-16 PROCEDURE — 97166 OT EVAL MOD COMPLEX 45 MIN: CPT

## 2019-07-16 PROCEDURE — 25000003 PHARM REV CODE 250: Performed by: PHYSICIAN ASSISTANT

## 2019-07-16 PROCEDURE — 86580 TB INTRADERMAL TEST: CPT | Performed by: HOSPITALIST

## 2019-07-16 PROCEDURE — 63600175 PHARM REV CODE 636 W HCPCS: Performed by: PHYSICIAN ASSISTANT

## 2019-07-16 PROCEDURE — 25000003 PHARM REV CODE 250: Performed by: NURSE PRACTITIONER

## 2019-07-16 RX ORDER — DOCUSATE SODIUM 100 MG/1
100 CAPSULE, LIQUID FILLED ORAL 2 TIMES DAILY
Status: DISCONTINUED | OUTPATIENT
Start: 2019-07-16 | End: 2019-07-19 | Stop reason: HOSPADM

## 2019-07-16 RX ADMIN — PANTOPRAZOLE SODIUM 40 MG: 40 TABLET, DELAYED RELEASE ORAL at 09:07

## 2019-07-16 RX ADMIN — AMLODIPINE BESYLATE 5 MG: 5 TABLET ORAL at 09:07

## 2019-07-16 RX ADMIN — ENOXAPARIN SODIUM 40 MG: 100 INJECTION SUBCUTANEOUS at 09:07

## 2019-07-16 RX ADMIN — FERROUS SULFATE TAB EC 325 MG (65 MG FE EQUIVALENT) 325 MG: 325 (65 FE) TABLET DELAYED RESPONSE at 09:07

## 2019-07-16 RX ADMIN — SUCRALFATE 1 G: 1 TABLET ORAL at 09:07

## 2019-07-16 RX ADMIN — ATORVASTATIN CALCIUM 40 MG: 20 TABLET, FILM COATED ORAL at 09:07

## 2019-07-16 RX ADMIN — GABAPENTIN 300 MG: 300 CAPSULE ORAL at 09:07

## 2019-07-16 RX ADMIN — SUCRALFATE 1 G: 1 TABLET ORAL at 06:07

## 2019-07-16 RX ADMIN — TUBERCULIN PURIFIED PROTEIN DERIVATIVE 5 UNITS: 5 INJECTION, SOLUTION INTRADERMAL at 09:07

## 2019-07-16 RX ADMIN — SUCRALFATE 1 G: 1 TABLET ORAL at 01:07

## 2019-07-16 RX ADMIN — DOCUSATE SODIUM 100 MG: 100 CAPSULE, LIQUID FILLED ORAL at 09:07

## 2019-07-16 RX ADMIN — OXYCODONE HYDROCHLORIDE AND ACETAMINOPHEN 1 TABLET: 5; 325 TABLET ORAL at 03:07

## 2019-07-16 RX ADMIN — CALCIUM CARBONATE 500 MG (1,250 MG)-VITAMIN D3 200 UNIT TABLET 1 TABLET: at 09:07

## 2019-07-16 NOTE — PLAN OF CARE
Problem: Fall Injury Risk  Goal: Absence of Fall and Fall-Related Injury  Outcome: Ongoing (interventions implemented as appropriate)   Patient is resting comfortably in bed, respirations are even and unlabored. Patient skin is warm and dry. Patient in no acute distress. The patient has been updated on plan of care and current status. Comfort postioning and restroom needs were addressed. Pt personal items placed at bedside. The call bell remains at bedside, bed in lowest position and side up x 2. Will continue to monitor patient.

## 2019-07-16 NOTE — PLAN OF CARE
LMSW met with patient at the bedside.     Patient is alert and oriented with no communication barriers.     Prior to admission patient was living alone with DME assistance to ambulate. Patient denies the use of HH. Patient states she has a Walker and cane in the home.      Patients PCP is retired and she has a appointment t [facility] for a new PCP but she does not know his name. Patient choice pharmacy is [facility] and [facility].       Patient denies a history of mental illness.     LMSW and patient discuss her home situation. Patient expressed she can not go home because she is afraid of falling. Patient says she knows she has to go to a NH. LMSW explained to the patient federal guidelines of nursing home placement including forfeiting her whole $700 check and given $38. Patient is very upset. She has fears of loosing her apartment and furniture. LMSW explained to the patient that the patient would live in the nursing home. Patient states she does not have any family she can live with. Patient does not want to give the NH her check but knows she can not stay home alone.     Patient has injections scheduled for her knee with Dr. Joi Vidal on Friday 7-. Patient also missed her Cardiology appointment at [facility] this morning. Patient is trying to get medically cleared to have a total knee replacement. LMSW explained to the patient that theses appointments are outpatient and she can not leave [facility] to go to the appointments and return.    Patient overwhelmed. LMSW will met back with the patient later today.     LMSW sent out penitentiary referrals. LMSW informed PA for PT/OT evaluations.     07/16/19 1041   Discharge Assessment   Assessment Type Discharge Planning Assessment   Confirmed/corrected address and phone number on facesheet? Yes   Assessment information obtained from? Patient   Communicated expected length of stay with patient/caregiver no   Prior to hospitilization cognitive status: Alert/Oriented    Prior to hospitalization functional status: Independent;Assistive Equipment   Current cognitive status: Alert/Oriented   Current Functional Status: Assistive Equipment   Lives With alone   Able to Return to Prior Arrangements yes   Is patient able to care for self after discharge? Yes   Patient's perception of discharge disposition home or selfcare;nursing home   Readmission Within the Last 30 Days no previous admission in last 30 days   Patient currently being followed by outpatient case management? No   Patient currently receives any other outside agency services? No   Equipment Currently Used at Home cane, straight;wheelchair;walker, standard   Do you have any problems affording any of your prescribed medications? No   Is the patient taking medications as prescribed? yes   Does the patient have transportation home? Yes   Transportation Anticipated agency   Does the patient receive services at the [facility]? No   Discharge Plan A New Nursing Home placement - care home care facility   Discharge Plan B Home Health   DME Needed Upon Discharge  none   Patient/Family in Agreement with Plan yes

## 2019-07-16 NOTE — PLAN OF CARE
"Problem: Physical Therapy Goal  Goal: Physical Therapy Goal  Goals to be met by: 19    Patient will increase functional independence with mobility by performin. Supine to sit with min A with use of HB features   2. Sit to supine with min A with use of HB feature   3. Rolling R and L with min A with use of HB features  4. Sitting at edge of bed >5 minutes with min A.   5. PT will assess sit<>stand.   6. PT will assess t/f bed to chair  7. PT will assess ambulation     Outcome: Ongoing (interventions implemented as appropriate)  Evaluation complete; goals established. Patient presented to PT evaluation with significant pain of L knee. PT performed "[place] Knee Rules" to assess need for radiograph. Given the patient is older garcia 58 y/o, has tenderness to head of fibula and cannot flex knee > 90 degrees, radiograph is warranted. Discussed findings with PA. Deferring OOB mobility until results of radiograph are reported and WB status of patient is received. Patient with increased fear of fall and wishes for long term placement 2/2 safety concerns when home alone. Recommending care home nursing facility with continued post acute care therapy PT/OT.       "

## 2019-07-16 NOTE — PLAN OF CARE
Problem: Adult Inpatient Plan of Care  Goal: Plan of Care Review  Outcome: Ongoing (interventions implemented as appropriate)  Patient placed on CPAP on documented settings. Will continue to monitor.

## 2019-07-16 NOTE — ASSESSMENT & PLAN NOTE
- patient morbidly obese with chronic b/l knee pain unable to care for herself; states she has an evaluation for bariatric surgery needs to lose weight to have knee surgery  - PT/OT recommendations appreciated  - patient requests skilled nursing placement

## 2019-07-16 NOTE — PLAN OF CARE
Problem: Adult Inpatient Plan of Care  Goal: Plan of Care Review  Outcome: Ongoing (interventions implemented as appropriate)  Plan of care reviewed with pt. Pt AAOx4, NAD. Pt reports left knee pain. Pain is chronic but exacerbated by recent fall.  No swelling or bruising noted to left knee. Pain managed with Percocet. VS and labs reviewed. Pt uses bedpan while here. Purposeful rounding performed. Safety measures implemented. Bed is lowered and locked. Call light is within reach. Will continue to monitor pt.

## 2019-07-16 NOTE — ASSESSMENT & PLAN NOTE
- chronic left knee pain with multiple interventions; Per medical record,  s/p L genicular RFA in 3/19/19 that provided 90% relief for 2 months then came back with most recent Left knee intra-articular injection 7/11/2019 and is scheduled for Phenol ablation of left genicular nerves for knee pain refractory to left genicular RFA  - x-ray left knee without evidence for acute fracture or dislocation; no changes from prior exams  - Continue Gabapentin 300mg PO BID  - Continue Percocet home dose  - PT/OT evaluation appreciated

## 2019-07-16 NOTE — PLAN OF CARE
Problem: Adult Inpatient Plan of Care  Goal: Plan of Care Review  Outcome: Ongoing (interventions implemented as appropriate)  Plan of care reviewed with patient. No distress noted at this time,. Peripheral IV saline locked. Percocet given once this shift with moderate relief. Fall precautions maintained with bed in lowest position, wheels locked, and call bell within reach. Telesitter at bedside. Pt expresses sadness and emotional support provided. Will continue to monitor closely.

## 2019-07-16 NOTE — PLAN OF CARE
Problem: Adult Inpatient Plan of Care  Goal: Plan of Care Review  Outcome: Ongoing (interventions implemented as appropriate)  Pt on RA. Sats 94%. No distress noted. CPAP on standby. Will continue to monitor.

## 2019-07-16 NOTE — ASSESSMENT & PLAN NOTE
- patient being evaluated for Bariatric Surgery  - continue to provide resources for healthy dietary choices

## 2019-07-16 NOTE — SUBJECTIVE & OBJECTIVE
Interval History: continues with left knee pain; appears to be chronic; x-ray of left knee showed no interval changes since prior studies and no acute fracture or dislocation; PT/OT consulted and appreciate recommendations; pain control    Review of Systems   Constitutional: Positive for activity change and fatigue. Negative for chills and fever.   HENT: Negative for congestion and trouble swallowing.    Eyes: Negative for photophobia and visual disturbance.   Respiratory: Negative for cough and shortness of breath.    Cardiovascular: Negative for chest pain, palpitations and leg swelling.   Gastrointestinal: Negative for abdominal pain, diarrhea, nausea and vomiting.   Endocrine: Negative.    Genitourinary: Negative for dysuria, frequency and urgency.   Musculoskeletal: Positive for arthralgias, gait problem (multiple falls due to left knee weakness) and myalgias. Negative for back pain and neck pain.   Skin: Negative for color change and wound.   Neurological: Negative for syncope, weakness, numbness and headaches.   Hematological: Does not bruise/bleed easily.   Psychiatric/Behavioral: The patient is nervous/anxious.      Objective:     Vital Signs (Most Recent):  Temp: 98.7 °F (37.1 °C) (07/16/19 1541)  Pulse: 82 (07/16/19 1541)  Resp: 18 (07/16/19 1541)  BP: (!) 122/58 (07/16/19 1541)  SpO2: (!) 94 % (07/16/19 1541) Vital Signs (24h Range):  Temp:  [97.7 °F (36.5 °C)-98.8 °F (37.1 °C)] 98.7 °F (37.1 °C)  Pulse:  [64-83] 82  Resp:  [16-18] 18  SpO2:  [92 %-98 %] 94 %  BP: (122-153)/(58-95) 122/58     Weight: (!) 170.4 kg (375 lb 10.6 oz)  Body mass index is 60.63 kg/m².  No intake or output data in the 24 hours ending 07/16/19 1623   Physical Exam   Constitutional: She is oriented to person, place, and time. She appears well-developed and well-nourished. No distress.   Morbidly obese female   HENT:   Head: Normocephalic and atraumatic.   Mouth/Throat: Oropharynx is clear and moist.   Eyes: Pupils are equal,  round, and reactive to light. Conjunctivae and EOM are normal. No scleral icterus.   Neck: Normal range of motion. Neck supple. No JVD present.   Cardiovascular: Normal rate, regular rhythm, normal heart sounds and intact distal pulses.   Pulmonary/Chest: Effort normal and breath sounds normal. No respiratory distress.   Abdominal: Soft. Bowel sounds are normal. There is no tenderness.   obese   Musculoskeletal: She exhibits tenderness (left tibia). She exhibits no edema or deformity.        Left knee: She exhibits no swelling, no ecchymosis, no deformity and no erythema. Tenderness found. No patellar tendon tenderness noted.   Neurological: She is alert and oriented to person, place, and time.   Skin: Skin is warm and dry. Capillary refill takes less than 2 seconds. No erythema.   Psychiatric: Her speech is normal and behavior is normal. Judgment and thought content normal. Her mood appears anxious.   Tearful during exam; concerned she cannot take care of herself   Nursing note and vitals reviewed.      Significant Labs:   CBC:   Recent Labs   Lab 07/15/19  1618   WBC 16.16*   HGB 12.2   HCT 37.9        CMP:   Recent Labs   Lab 07/15/19  1618      K 4.1      CO2 25   GLU 90   BUN 11   CREATININE 0.8   CALCIUM 9.5   PROT 7.0   ALBUMIN 3.6   BILITOT 0.6   ALKPHOS 93   AST 17   ALT 17   ANIONGAP 9   EGFRNONAA >60     Urine Studies:   Recent Labs   Lab 07/15/19  1605   COLORU Yellow   APPEARANCEUA Clear   PHUR 6.0   SPECGRAV <=1.005*   PROTEINUA Negative   GLUCUA Negative   KETONESU Negative   BILIRUBINUA Negative   OCCULTUA Negative   NITRITE Negative   UROBILINOGEN Negative   LEUKOCYTESUR Negative     All pertinent labs within the past 24 hours have been reviewed.    Significant Imaging: I have reviewed all pertinent imaging results/findings within the past 24 hours.

## 2019-07-16 NOTE — PROGRESS NOTES
"[facility] Medicine  Progress Note    Patient Name: Ca Coats  MRN: [id number]  Patient Class: OP- Observation   Admission Date: 7/15/2019  Length of Stay: 0 days  Attending Physician: Yin Cedillo MD  Primary Care Provider: Primary Doctor No        Subjective:     Principal Problem:Chronic pain of left knee      HPI:  Per VADIM Gannon PA:  67 y/o female with Hx of HTN, morbid obesity, LAURA on CPAP, Depression, Hypothyroidism, chronic knee pain followed by Pain management presents to ED with c/o fall today. States she has chronic left knee pain, and felt her knee "give out".  Patient states she has been having multiple falls due to her knee. Reports she has home health but no one is at home with her. Reports that she is afraid she is going to fall and hurt herself. Reports she calls 911 whenever she falls. She doesn't fell safe going home. Denies LOC, fever, chills, CP, SOB, N/V/D, dysuria, numbness.  Per ED paramedics stated that her house is extremely small difficult to get around. Admitted to OBS for PT/OT, placement.        Overview/Hospital Course:  No notes on file    Interval History: continues with left knee pain; appears to be chronic; x-ray of left knee showed no interval changes since prior studies and no acute fracture or dislocation; PT/OT consulted and appreciate recommendations; pain control    Review of Systems   Constitutional: Positive for activity change and fatigue. Negative for chills and fever.   HENT: Negative for congestion and trouble swallowing.    Eyes: Negative for photophobia and visual disturbance.   Respiratory: Negative for cough and shortness of breath.    Cardiovascular: Negative for chest pain, palpitations and leg swelling.   Gastrointestinal: Negative for abdominal pain, diarrhea, nausea and vomiting.   Endocrine: Negative.    Genitourinary: Negative for dysuria, frequency and urgency.   Musculoskeletal: Positive for arthralgias, gait " problem (multiple falls due to left knee weakness) and myalgias. Negative for back pain and neck pain.   Skin: Negative for color change and wound.   Neurological: Negative for syncope, weakness, numbness and headaches.   Hematological: Does not bruise/bleed easily.   Psychiatric/Behavioral: The patient is nervous/anxious.      Objective:     Vital Signs (Most Recent):  Temp: 98.7 °F (37.1 °C) (07/16/19 1541)  Pulse: 82 (07/16/19 1541)  Resp: 18 (07/16/19 1541)  BP: (!) 122/58 (07/16/19 1541)  SpO2: (!) 94 % (07/16/19 1541) Vital Signs (24h Range):  Temp:  [97.7 °F (36.5 °C)-98.8 °F (37.1 °C)] 98.7 °F (37.1 °C)  Pulse:  [64-83] 82  Resp:  [16-18] 18  SpO2:  [92 %-98 %] 94 %  BP: (122-153)/(58-95) 122/58     Weight: (!) 170.4 kg (375 lb 10.6 oz)  Body mass index is 60.63 kg/m².  No intake or output data in the 24 hours ending 07/16/19 1623   Physical Exam   Constitutional: She is oriented to person, place, and time. She appears well-developed and well-nourished. No distress.   Morbidly obese female   HENT:   Head: Normocephalic and atraumatic.   Mouth/Throat: Oropharynx is clear and moist.   Eyes: Pupils are equal, round, and reactive to light. Conjunctivae and EOM are normal. No scleral icterus.   Neck: Normal range of motion. Neck supple. No JVD present.   Cardiovascular: Normal rate, regular rhythm, normal heart sounds and intact distal pulses.   Pulmonary/Chest: Effort normal and breath sounds normal. No respiratory distress.   Abdominal: Soft. Bowel sounds are normal. There is no tenderness.   obese   Musculoskeletal: She exhibits tenderness (left tibia). She exhibits no edema or deformity.        Left knee: She exhibits no swelling, no ecchymosis, no deformity and no erythema. Tenderness found. No patellar tendon tenderness noted.   Neurological: She is alert and oriented to person, place, and time.   Skin: Skin is warm and dry. Capillary refill takes less than 2 seconds. No erythema.   Psychiatric: Her speech  is normal and behavior is normal. Judgment and thought content normal. Her mood appears anxious.   Tearful during exam; concerned she cannot take care of herself   Nursing note and vitals reviewed.      Significant Labs:   CBC:   Recent Labs   Lab 07/15/19  1618   WBC 16.16*   HGB 12.2   HCT 37.9        CMP:   Recent Labs   Lab 07/15/19  1618      K 4.1      CO2 25   GLU 90   BUN 11   CREATININE 0.8   CALCIUM 9.5   PROT 7.0   ALBUMIN 3.6   BILITOT 0.6   ALKPHOS 93   AST 17   ALT 17   ANIONGAP 9   EGFRNONAA >60     Urine Studies:   Recent Labs   Lab 07/15/19  1605   COLORU Yellow   APPEARANCEUA Clear   PHUR 6.0   SPECGRAV <=1.005*   PROTEINUA Negative   GLUCUA Negative   KETONESU Negative   BILIRUBINUA Negative   OCCULTUA Negative   NITRITE Negative   UROBILINOGEN Negative   LEUKOCYTESUR Negative     All pertinent labs within the past 24 hours have been reviewed.    Significant Imaging: I have reviewed all pertinent imaging results/findings within the past 24 hours.             Assessment/Plan:      * Chronic pain of left knee  - chronic left knee pain with multiple interventions; Per medical record,  s/p L genicular RFA in 3/19/19 that provided 90% relief for 2 months then came back with most recent Left knee intra-articular injection 7/11/2019 and is scheduled for Phenol ablation of left genicular nerves for knee pain refractory to left genicular RFA  - x-ray left knee without evidence for acute fracture or dislocation; no changes from prior exams  - Continue Gabapentin 300mg PO BID  - Continue Percocet home dose  - PT/OT evaluation appreciated    Debility  - patient morbidly obese with chronic b/l knee pain unable to care for herself; states she has an evaluation for bariatric surgery needs to lose weight to have knee surgery  - PT/OT recommendations appreciated  - patient requests skilled nursing placement       Morbid obesity  - patient being evaluated for Bariatric Surgery  - continue to  provide resources for healthy dietary choices      Hypothyroidism  - states she no longer is on medication  -   Lab Results   Component Value Date    TSH 0.830 05/13/2019         Hyperlipidemia  - continue home atorvastatin 40 mg daily    Essential hypertension  - currently normotensive   - resume home medications  - amlodipine 5 mg, lisinopril 40 mg  - monitor      Obstructive sleep apnea  - CPAP nightly with settings per respiratory      VTE Risk Mitigation (From admission, onward)        Ordered     enoxaparin injection 40 mg  Every 12 hours      07/15/19 1714     IP VTE HIGH RISK PATIENT  Once      07/15/19 1714     Place sequential compression device  Until discontinued      07/15/19 1714                Marci Leggett NP  Department of Hospital Medicine   [facility], [place], operated by [facility]

## 2019-07-16 NOTE — PT/OT/SLP EVAL
"Physical Therapy Evaluation    Patient Name:  Ca Coats   MRN:  [id number]    Recommendations:     Discharge Recommendations:  (pending progress)   Discharge Equipment Recommendations: (Pending progress but if d/c'ed home will need RW and shower bench)   Barriers to discharge: Decreased caregiver support    Assessment:     Ca Coats is a 68 y.o. female admitted with a medical diagnosis of Chronic pain of left knee.  She presents with the following impairments/functional limitations:  weakness, impaired functional mobilty, pain, decreased lower extremity function, decreased ROM, impaired self care skills, impaired balance, gait instability.    Evaluation complete; goals established. Patient presented to PT evaluation with significant pain of L knee. PT performed "[place] Knee Rules" to assess need for radiograph. Given the patient is older garcia 58 y/o, has tenderness to head of fibula and cannot flex knee > 90 degrees, radiograph is warranted. Discussed findings with PA. Deferring OOB mobility until results of radiograph are reported and WB status of patient is received. Patient with increased fear of fall and wishes for long term placement 2/2 safety concerns when home alone. Recommending nursing home nursing facility with continued post acute care therapy PT/OT.     Rehab Prognosis: Good; patient would benefit from acute skilled PT services to address these deficits and reach maximum level of function.    Recent Surgery: * No surgery found *      Plan:     During this hospitalization, patient to be seen 3 x/week to address the identified rehab impairments via gait training, therapeutic activities, therapeutic exercises, neuromuscular re-education, wheelchair management/training and progress toward the following goals:    · Plan of Care Expires:  08/15/19    Subjective     Chief Complaint: Constant pain in knee but increased pain after fall   Patient/Family Comments/goals: Patient stated, "I " "need to get a knee replacement but the doctors said I need that lap band surgery to lose weight before." Patient with complaints of increase knee pain after falling.   Pain/Comfort:  Pain Rating 1: 8/10  Location - Side 1: Left  Location - Orientation 1: generalized  Location 1: knee(Tender to fibular head)  Pain Addressed 1: Reposition, Distraction  Pain Rating Post-Intervention 1: 810    Patients cultural, spiritual, Sikhism conflicts given the current situation: no    Living Environment:  · Lives alone in first floor apartment complex with single step/threshold to enter  · Has tub/shower combo and low toilet seat; fearful of bathing 2/2 hx of falls  · Patient reports no family to help; Neighbor occasionally helps  · Neighbor gets patients groceries because patient does not drive  · Patient uses public transportation for appointments  · Patient with hx of falls within home when going to the bathroom  · Ambulates with cane or SW  · Requesting long term placement   · Denies hobbies   · Equipment used at home: walker, standard, cane, straight, wheelchair.    Objective:     Communicated with RN prior to session.  Patient found supine with peripheral IV  upon PT entry to room.    General Precautions: Standard, (Cardiac diet)   Orthopedic Precautions:(Awaiting results of xray )   Braces: N/A     Exams:  Cognition:   Patient is oriented to name, , location and situation  Pt follows approximately 100% of multi-step commands.    Mood: Pleasant and cooperative.  Musculoskeletal:  Posture:    -       No postural abnormalities identified  LE ROM/Strength: RLE unable to flx > 30 degrees; noted increased pain   Tender to touch at fibular head   Neuromuscular:  Sensation: Intact to light touch bilateral LEs.   Tone/Reflexes: No impairments identified with functional mobility. No formal testing performed.   Visual-vestibular: No impairments identified with functional mobility. No formal testing performed.  Integument: " Visible skin intact  Cardiopulmonary:  BLE edema: 1+    [place] Knee Rules:  If one of the following is present, radiographs are indicated:    · Age >55 years (+)  · Isolated patellar tenderness without other bone tenderness (-)  · Tenderness of the fibular head (+)  · Inability to flex the knee to 90° (+)  · Inability to bear weight immediately after injury and in the emergency department (4 steps) regardless of limping (NT 2/2 pain)      Functional Mobility:  · Did not perform functional mobility assessment 2/2 results of [place] Knee Rules. Will further assess when results of radiograph are released.      AM-PAC 6 CLICK MOBILITY  Total Score:9     Patient left supine with all lines intact, call button in reach and RN/PA notified.    GOALS:   Multidisciplinary Problems     Physical Therapy Goals        Problem: Physical Therapy Goal    Goal Priority Disciplines Outcome Goal Variances Interventions   Physical Therapy Goal     PT, PT/OT Ongoing (interventions implemented as appropriate)     Description:  Goals to be met by: 19    Patient will increase functional independence with mobility by performin. Supine to sit with min A with use of HB features   2. Sit to supine with min A with use of HB feature   3. Rolling R and L with min A with use of HB features  4. Sitting at edge of bed >5 minutes with min A.   5. PT will assess sit<>stand.   6. PT will assess t/f bed to chair  7. PT will assess ambulation                       History:     Past Medical History:   Diagnosis Date    Anemia     2018    Arthritis     BMI 60.0-69.9, adult     Cataract     Depression     Dry eye syndrome     GERD (gastroesophageal reflux disease)     Glaucoma suspect     History of gastric ulcer     Hyperlipidemia     Hypertension     Hypothyroidism     Morbid obesity     Neuromuscular disorder     Thyroid disease        Past Surgical History:   Procedure Laterality Date    EOORYSYI-[id number]-SLWIDA Left  7/31/2018    [facility]    OYPJSSJI-NBQRNSSZDNYFWC-BVKQCU, LEFT Left 3/19/2019    [facility] at [facility]    APPENDECTOMY      BLOCK, NERVE Left 7/10/2018    [facility]    CATARACT EXTRACTION W/  INTRAOCULAR LENS IMPLANT Bilateral     MFIOL OU    cataract surgery   2017    Injection LEFT KNEE STEROID INJECTION Left 12/21/2018    [facility]    INJECTION-JOINT Left 5/8/2018    Performed by [facility] at [facility]       Time Tracking:     PT Received On: 07/16/19  PT Start Time: 0841     PT Stop Time: 0902  PT Total Time (min): 21 min     Billable Minutes: Evaluation 21      Deneen Callahan, PT  07/16/2019

## 2019-07-17 PROBLEM — M17.12 PRIMARY OSTEOARTHRITIS OF LEFT KNEE: Status: ACTIVE | Noted: 2018-07-10

## 2019-07-17 PROBLEM — R26.81 GAIT INSTABILITY: Status: ACTIVE | Noted: 2019-07-15

## 2019-07-17 PROCEDURE — 99900035 HC TECH TIME PER 15 MIN (STAT)

## 2019-07-17 PROCEDURE — 94761 N-INVAS EAR/PLS OXIMETRY MLT: CPT

## 2019-07-17 PROCEDURE — 97535 SELF CARE MNGMENT TRAINING: CPT | Mod: 59

## 2019-07-17 PROCEDURE — 97530 THERAPEUTIC ACTIVITIES: CPT

## 2019-07-17 PROCEDURE — 25000003 PHARM REV CODE 250: Performed by: PHYSICIAN ASSISTANT

## 2019-07-17 PROCEDURE — 94660 CPAP INITIATION&MGMT: CPT

## 2019-07-17 PROCEDURE — 99226 PR SUBSEQUENT OBSERVATION CARE,LEVEL III: CPT | Mod: ,,, | Performed by: NURSE PRACTITIONER

## 2019-07-17 PROCEDURE — 63600175 PHARM REV CODE 636 W HCPCS: Performed by: PHYSICIAN ASSISTANT

## 2019-07-17 PROCEDURE — 25000003 PHARM REV CODE 250: Performed by: NURSE PRACTITIONER

## 2019-07-17 PROCEDURE — 99226 PR SUBSEQUENT OBSERVATION CARE,LEVEL III: ICD-10-PCS | Mod: ,,, | Performed by: NURSE PRACTITIONER

## 2019-07-17 PROCEDURE — G0378 HOSPITAL OBSERVATION PER HR: HCPCS

## 2019-07-17 RX ORDER — LISINOPRIL 20 MG/1
40 TABLET ORAL NIGHTLY
Status: DISCONTINUED | OUTPATIENT
Start: 2019-07-18 | End: 2019-07-19 | Stop reason: HOSPADM

## 2019-07-17 RX ADMIN — AMLODIPINE BESYLATE 5 MG: 5 TABLET ORAL at 08:07

## 2019-07-17 RX ADMIN — ATORVASTATIN CALCIUM 40 MG: 20 TABLET, FILM COATED ORAL at 08:07

## 2019-07-17 RX ADMIN — GABAPENTIN 300 MG: 300 CAPSULE ORAL at 08:07

## 2019-07-17 RX ADMIN — PANTOPRAZOLE SODIUM 40 MG: 40 TABLET, DELAYED RELEASE ORAL at 09:07

## 2019-07-17 RX ADMIN — GABAPENTIN 300 MG: 300 CAPSULE ORAL at 09:07

## 2019-07-17 RX ADMIN — ENOXAPARIN SODIUM 40 MG: 100 INJECTION SUBCUTANEOUS at 09:07

## 2019-07-17 RX ADMIN — SUCRALFATE 1 G: 1 TABLET ORAL at 06:07

## 2019-07-17 RX ADMIN — CALCIUM CARBONATE 500 MG (1,250 MG)-VITAMIN D3 200 UNIT TABLET 1 TABLET: at 08:07

## 2019-07-17 RX ADMIN — FERROUS SULFATE TAB EC 325 MG (65 MG FE EQUIVALENT) 325 MG: 325 (65 FE) TABLET DELAYED RESPONSE at 08:07

## 2019-07-17 RX ADMIN — SUCRALFATE 1 G: 1 TABLET ORAL at 12:07

## 2019-07-17 RX ADMIN — DOCUSATE SODIUM 100 MG: 100 CAPSULE, LIQUID FILLED ORAL at 09:07

## 2019-07-17 RX ADMIN — PANTOPRAZOLE SODIUM 40 MG: 40 TABLET, DELAYED RELEASE ORAL at 08:07

## 2019-07-17 RX ADMIN — ENOXAPARIN SODIUM 40 MG: 100 INJECTION SUBCUTANEOUS at 11:07

## 2019-07-17 RX ADMIN — DOCUSATE SODIUM 100 MG: 100 CAPSULE, LIQUID FILLED ORAL at 08:07

## 2019-07-17 RX ADMIN — SUCRALFATE 1 G: 1 TABLET ORAL at 05:07

## 2019-07-17 RX ADMIN — OXYCODONE HYDROCHLORIDE AND ACETAMINOPHEN 1 TABLET: 5; 325 TABLET ORAL at 12:07

## 2019-07-17 RX ADMIN — SUCRALFATE 1 G: 1 TABLET ORAL at 09:07

## 2019-07-17 RX ADMIN — LISINOPRIL 40 MG: 20 TABLET ORAL at 08:07

## 2019-07-17 NOTE — PLAN OF CARE
Problem: Occupational Therapy Goal  Goal: Occupational Therapy Goal  Goals to be met by: 7/30/19     Patient will increase functional independence with ADLs by performing:    LE Dressing with Minimal Assistance and Assistive Devices as needed.  Grooming while standing at sink with Contact Guard Assistance.  Toileting from bedside commode with Moderate Assistance for hygiene and clothing management.   Bathing from  edge of bed with Minimal Assistance.  Toilet transfer to bedside commode with Stand-by Assistance.    Outcome: Ongoing (interventions implemented as appropriate)  Initial OT eval/treat complete.  Supine<>sit with CGA, HOB elevated, and use of safety rails.  Step transfer bed<>BSC with CGA for steadying/safety and use of RW.  Able to manage gown prior to and after toileting task though requiring increased assistance for toilet hygiene -assist lifting abdominal panus and cleaning front jailene region thoroughly while BUE are supported at bariatric RW.  Has SPC, SW, and shower stool.  Lives alone and does not have family/friends that can assist in her care; her daughter lives far away and also works.  Reports increased difficulty in last month in a half with donning socks and difficulty getting in and out of tub, also reports difficulty with thorough toilet hygiene.  Recommend post acute therapy services in either nursing home or assisted living facility placement though will continue to assess client needs.  To benefit from continued acute care OT services to increase independence in self-care/functional transfers.  OT to follow.

## 2019-07-17 NOTE — SUBJECTIVE & OBJECTIVE
Interval History: continues with left knee pain; appears to be chronic; x-ray of left knee showed no interval changes since prior studies and no acute fracture or dislocation; PT/OT consulted and appreciate recommendations; pain control    Review of Systems   Constitutional: Positive for activity change and fatigue. Negative for chills and fever.   HENT: Negative for congestion and trouble swallowing.    Eyes: Negative for photophobia and visual disturbance.   Respiratory: Negative for cough and shortness of breath.    Cardiovascular: Negative for chest pain, palpitations and leg swelling.   Gastrointestinal: Negative for abdominal pain, diarrhea, nausea and vomiting.   Endocrine: Negative.    Genitourinary: Negative for dysuria, frequency and urgency.   Musculoskeletal: Positive for arthralgias and gait problem (multiple falls due to left knee weakness).   Skin: Negative for color change and wound.   Neurological: Negative for syncope, weakness, numbness and headaches.   Hematological: Does not bruise/bleed easily.   Psychiatric/Behavioral: The patient is nervous/anxious.      Objective:     Vital Signs (Most Recent):  Temp: 97.6 °F (36.4 °C) (07/17/19 0748)  Pulse: 75 (07/17/19 1208)  Resp: 16 (07/17/19 1208)  BP: 125/77 (07/17/19 1208)  SpO2: 95 % (07/17/19 1208) Vital Signs (24h Range):  Temp:  [97.6 °F (36.4 °C)-99.2 °F (37.3 °C)] 97.6 °F (36.4 °C)  Pulse:  [75-88] 75  Resp:  [16] 16  SpO2:  [95 %-96 %] 95 %  BP: (112-148)/(59-77) 125/77     Weight: (!) 170.4 kg (375 lb 10.6 oz)  Body mass index is 60.63 kg/m².    Intake/Output Summary (Last 24 hours) at 7/17/2019 1605  Last data filed at 7/17/2019 1556  Gross per 24 hour   Intake 500 ml   Output 1650 ml   Net -1150 ml      Physical Exam   Constitutional: She is oriented to person, place, and time. She appears well-developed and well-nourished. No distress.   Morbidly obese female   HENT:   Head: Normocephalic and atraumatic.   Mouth/Throat: Oropharynx is clear  and moist.   Eyes: Pupils are equal, round, and reactive to light. Conjunctivae and EOM are normal. No scleral icterus.   Neck: Normal range of motion. Neck supple. No JVD present.   Cardiovascular: Normal rate, regular rhythm, normal heart sounds and intact distal pulses.   Pulmonary/Chest: Effort normal and breath sounds normal. No respiratory distress.   Abdominal: Soft. Bowel sounds are normal. There is no tenderness.   obese   Musculoskeletal: She exhibits tenderness (left tibia). She exhibits no edema or deformity.        Left knee: She exhibits no swelling, no ecchymosis, no deformity and no erythema. Tenderness found. No patellar tendon tenderness noted.   Neurological: She is alert and oriented to person, place, and time.   Skin: Skin is warm and dry. Capillary refill takes less than 2 seconds. No erythema.   Psychiatric: Her speech is normal and behavior is normal. Judgment and thought content normal. Her mood appears anxious.   Tearful during exam; concerned she cannot take care of herself   Nursing note and vitals reviewed.      Significant Labs:   CBC:   Recent Labs   Lab 07/15/19  1618   WBC 16.16*   HGB 12.2   HCT 37.9        CMP:   Recent Labs   Lab 07/15/19  1618      K 4.1      CO2 25   GLU 90   BUN 11   CREATININE 0.8   CALCIUM 9.5   PROT 7.0   ALBUMIN 3.6   BILITOT 0.6   ALKPHOS 93   AST 17   ALT 17   ANIONGAP 9   EGFRNONAA >60     Urine Studies:   No results for input(s): COLORU, APPEARANCEUA, PHUR, SPECGRAV, PROTEINUA, GLUCUA, KETONESU, BILIRUBINUA, OCCULTUA, NITRITE, UROBILINOGEN, LEUKOCYTESUR, RBCUA, WBCUA, BACTERIA, SQUAMEPITHEL, HYALINECASTS in the last 48 hours.    Invalid input(s): WRIGHTSUR  All pertinent labs within the past 24 hours have been reviewed.    Significant Imaging: I have reviewed all pertinent imaging results/findings within the past 24 hours.

## 2019-07-17 NOTE — PLAN OF CARE
Problem: Adult Inpatient Plan of Care  Goal: Plan of Care Review  Outcome: Ongoing (interventions implemented as appropriate)  Pt on RA. Sats 96%. No distress noted. CPAP on standby. Will continue to monitor.

## 2019-07-17 NOTE — PROGRESS NOTES
"[facility] Medicine  Progress Note    Patient Name: Ca Coats  MRN: [id number]  Patient Class: OP- Observation   Admission Date: 7/15/2019  Length of Stay: 0 days  Attending Physician: Yin Cedillo MD  Primary Care Provider: Primary Doctor No        Subjective:     Principal Problem:Primary osteoarthritis of left knee      HPI:  Per VADIM Gannon PA:  69 y/o female with Hx of HTN, morbid obesity, LAURA on CPAP, Depression, Hypothyroidism, chronic knee pain followed by Pain management presents to ED with c/o fall today. States she has chronic left knee pain, and felt her knee "give out".  Patient states she has been having multiple falls due to her knee. Reports she has home health but no one is at home with her. Reports that she is afraid she is going to fall and hurt herself. Reports she calls 911 whenever she falls. She doesn't fell safe going home. Denies LOC, fever, chills, CP, SOB, N/V/D, dysuria, numbness.  Per ED paramedics stated that her house is extremely small difficult to get around. Admitted to OBS for PT/OT, placement.         Interval History: continues with left knee pain and working well with PT/OT; x-ray of left knee showed no interval changes since prior studies and no acute fracture or dislocation; PT/OT consulted and agree with recommendations for placement to skilled nursing facility; Nutrition consult pending and Case Management consulted     Review of Systems   Constitutional: Positive for activity change and fatigue. Negative for chills and fever.   HENT: Negative for congestion and trouble swallowing.    Eyes: Negative for photophobia and visual disturbance.   Respiratory: Negative for cough and shortness of breath.    Cardiovascular: Negative for chest pain, palpitations and leg swelling.   Gastrointestinal: Negative for abdominal pain, diarrhea, nausea and vomiting.   Endocrine: Negative.    Genitourinary: Negative for dysuria, frequency and " urgency.   Musculoskeletal: Positive for arthralgias and gait problem (multiple falls due to left knee weakness).   Skin: Negative for color change and wound.   Neurological: Negative for syncope, weakness, numbness and headaches.   Hematological: Does not bruise/bleed easily.   Psychiatric/Behavioral: The patient is nervous/anxious.      Objective:     Vital Signs (Most Recent):  Temp: 97.6 °F (36.4 °C) (07/17/19 0748)  Pulse: 75 (07/17/19 1208)  Resp: 16 (07/17/19 1208)  BP: 125/77 (07/17/19 1208)  SpO2: 95 % (07/17/19 1208) Vital Signs (24h Range):  Temp:  [97.6 °F (36.4 °C)-99.2 °F (37.3 °C)] 97.6 °F (36.4 °C)  Pulse:  [75-88] 75  Resp:  [16] 16  SpO2:  [95 %-96 %] 95 %  BP: (112-148)/(59-77) 125/77     Weight: (!) 170.4 kg (375 lb 10.6 oz)  Body mass index is 60.63 kg/m².    Intake/Output Summary (Last 24 hours) at 7/17/2019 1605  Last data filed at 7/17/2019 1556  Gross per 24 hour   Intake 500 ml   Output 1650 ml   Net -1150 ml      Physical Exam   Constitutional: She is oriented to person, place, and time. She appears well-developed and well-nourished. No distress.   Morbidly obese female   HENT:   Head: Normocephalic and atraumatic.   Mouth/Throat: Oropharynx is clear and moist.   Eyes: Pupils are equal, round, and reactive to light. Conjunctivae and EOM are normal. No scleral icterus.   Neck: Normal range of motion. Neck supple. No JVD present.   Cardiovascular: Normal rate, regular rhythm, normal heart sounds and intact distal pulses.   Pulmonary/Chest: Effort normal and breath sounds normal. No respiratory distress.   Abdominal: Soft. Bowel sounds are normal. There is no tenderness.   obese   Musculoskeletal: She exhibits tenderness (left tibia). She exhibits no edema or deformity.        Left knee: She exhibits no swelling, no ecchymosis, no deformity and no erythema. Tenderness found. No patellar tendon tenderness noted.   Neurological: She is alert and oriented to person, place, and time.   Skin: Skin  is warm and dry. Capillary refill takes less than 2 seconds. No erythema.   Psychiatric: Her speech is normal and behavior is normal. Judgment and thought content normal. Her mood appears anxious.   Tearful during exam; concerned she cannot take care of herself   Nursing note and vitals reviewed.      Significant Labs:   CBC:   Recent Labs   Lab 07/15/19  1618   WBC 16.16*   HGB 12.2   HCT 37.9        CMP:   Recent Labs   Lab 07/15/19  1618      K 4.1      CO2 25   GLU 90   BUN 11   CREATININE 0.8   CALCIUM 9.5   PROT 7.0   ALBUMIN 3.6   BILITOT 0.6   ALKPHOS 93   AST 17   ALT 17   ANIONGAP 9   EGFRNONAA >60    All pertinent labs within the past 24 hours have been reviewed.    Significant Imaging: I have reviewed all pertinent imaging results/findings within the past 24 hours.       Assessment/Plan:      * Primary osteoarthritis of left knee  - patient has failed conservative treatment of this knee with physical therapy, steroid injections, and Synvisc injection  - recent gait instability related to worsening of pain   - per medical record review plans to have total knee arthroplasty dependent on weight loss and is currently enrolled at [facility] Bariatric program; she has missed appointments based on inability to ambulate and lack of caregiver support  - history of recent falls  - on admit, x-ray left knee without evidence for acute fracture or dislocation; no changes from prior exams  - Continue Gabapentin 300mg PO BID  - Continue Percocet home dose  - PT/OT evaluation and recommends skilled nursing facility    At high risk for injury related to fall  Related to gait instability from worsening degenerative joint disease left knee and morbid obesity      Gait instability  - patient morbidly obese with chronic degenerative joint disease of left knee and recent history of multiple falls related to gait instability  - debility related to fear of falling; fall risk  - PT/OT consulted and  agree with recommendation for skilled nursing facility placement          Morbid obesity  - patient being evaluated for Bariatric Surgery at [facility]  - inpatient consult to Nutritionist for recommendations for caloric intake  - continue to provide resources for healthy dietary choices      Obstructive sleep apnea  - CPAP nightly with settings per respiratory      Essential hypertension  - currently normotensive   - resume home medications  - amlodipine 5 mg, lisinopril 40 mg  - monitor      Hyperlipidemia  - continue home atorvastatin 40 mg daily      VTE Risk Mitigation (From admission, onward)        Ordered     enoxaparin injection 40 mg  Every 12 hours      07/15/19 1714     IP VTE HIGH RISK PATIENT  Once      07/15/19 1714     Place sequential compression device  Until discontinued      07/15/19 1714                Marci Leggett NP  Department of Hospital Medicine   [facility], [place], operated by [facility]

## 2019-07-17 NOTE — PLAN OF CARE
Problem: Adult Inpatient Plan of Care  Goal: Plan of Care Review  Outcome: Ongoing (interventions implemented as appropriate)  Patient received on room air and later placed on CPAP on documented settings, will continue to monitor.

## 2019-07-17 NOTE — PLAN OF CARE
"Problem: Occupational Therapy Goal  Goal: Occupational Therapy Goal  Goals to be met by: 7/30/19     Patient will increase functional independence with ADLs by performing:    LE Dressing with Minimal Assistance and Assistive Devices as needed.  Grooming while standing at sink with Contact Guard Assistance.  Toileting from bedside commode with Moderate Assistance for hygiene and clothing management.  GOAL MET 7/17/19.  Bathing from  edge of bed with Minimal Assistance.  Toilet transfer to bedside commode with Stand-by Assistance.  GOAL MET 7/17/19.     Outcome: Ongoing (interventions implemented as appropriate)  With c/o dizziness this day upon sitting up with vitals taken and found to be 95%, 125/77, and 75 BPM - subsiding with prolonged sitting.  SBA for step transfer bed<>BSC with verbal cues still needed for controlled descent and standing.  Continues to require assist for front jailene hygiene in stance.  Bathing task and LB dress task seated EOB with rest breaks needed after forward leaning to reach legs/feet and after donning socks.  Fatigued during bathing tasks requiring assist with front/back jailene region and posterior thighs while in stance with BUE supported at RW; able to clean all other regions in sitting.  Vitals taken after standing ADL task and found to be 96%, 141/77, and 85 BPM.  Pt. Reporting this day that she typically has a neighbor that brings her to grocery shop and also uses insurance provided transportation at home though states she has to walk to the vehicle in which she is concerned about her knee giving out.  Very fearful and concerned about falling, stating "If my leg gives out on me, I'll be all alone and no one is there to help me."  Recommend post acute therapy in Assisted Living Facility with HH therapy versus SNF pending the preference of patient.  Does not have assistance at home.  Progressing towards goals.  To benefit from continued acute care OT services to increase independence in " self-care/functional transfers.  Continue POC.

## 2019-07-17 NOTE — ASSESSMENT & PLAN NOTE
- patient morbidly obese with chronic degenerative joint disease of left knee and recent history of multiple falls related to gait instability  - debility related to fear of falling; fall risk  - PT/OT consulted and agree with recommendation for skilled nursing facility placement

## 2019-07-17 NOTE — PT/OT/SLP PROGRESS
"Occupational Therapy   Treatment    Name: Ca Coats  MRN: [id number]  Admitting Diagnosis:  Primary osteoarthritis of left knee       Recommendations:     Discharge Recommendations: other (see comments)(Recommend post acute therapy in Assisted Living Facility with HH therapy versus SNF pending the preference of patient.)  Discharge Equipment Recommendations:  other (see comments)(needs bariatric BSC, TTB, and RW)  Barriers to discharge:  Decreased caregiver support, Other (Comment)(current functional level)    Assessment:   With c/o dizziness this day upon sitting up with vitals taken and found to be 95%, 125/77, and 75 BPM - subsiding with prolonged sitting.  SBA for step transfer bed<>BSC with verbal cues still needed for controlled descent and standing.  Continues to require assist for front jailene hygiene in stance.  Bathing task and LB dress task seated EOB with rest breaks needed after forward leaning to reach legs/feet and after donning socks with CGA and forward leaning at EOB.  Fatigued during bathing tasks requiring assist with front/back jailene region and posterior thighs while in stance with BUE supported at RW; able to clean all other regions in sitting.  Vitals taken after standing ADL task and found to be 96%, 141/77, and 85 BPM.  Pt. Reporting this day that she typically has a neighbor that brings her to grocery shop and also uses insurance provided transportation at home though states she has to walk to the vehicle in which she is concerned about her knee giving out.  Very fearful and concerned about falling, stating "If my leg gives out on me, I'll be all alone and no one is there to help me."  Recommend post acute therapy in Assisted Living Facility with HH therapy versus SNF pending the preference of patient.  Does not have assistance at home.  Progressing towards goals.  To benefit from continued acute care OT services to increase independence in self-care/functional transfers.  Continue " POC.     Ca Coats is a 68 y.o. female with a medical diagnosis of Primary osteoarthritis of left knee.  She presents with below deficits decreasing independence in self-care/functional transfers. Performance deficits affecting function are weakness, impaired endurance, impaired self care skills, impaired functional mobilty, gait instability, impaired balance, pain.     Rehab Prognosis:  Good; patient would benefit from acute skilled OT services to address these deficits and reach maximum level of function.       Plan:     Patient to be seen 5 x/week to address the above listed problems via self-care/home management, therapeutic activities, therapeutic exercises  · Plan of Care Expires: 07/30/19  · Plan of Care Reviewed with: patient    Subjective     Pain/Comfort:  Pain Rating 1: 8/10  Location - Side 1: Bilateral  Location - Orientation 1: generalized  Location 1: knee  Pain Addressed 1: Pre-medicate for activity, Reposition, Distraction, Nurse notified  Pain Rating Post-Intervention 1: 7/10    Objective:     Communicated with: Nursing prior to session.  Patient found HOB elevated with peripheral IV upon OT entry to room.    General Precautions: Standard, fall   Orthopedic Precautions:N/A   Braces: N/A     Occupational Performance:     Bed Mobility:    · Patient completed Supine to Sit with stand by assistance with HOB elevated   · Patient completed Sit to Supine with stand by assistance and increased time with HOB completely lowered     Functional Mobility/Transfers:  · Patient completed Sit <> Stand Transfer with stand by assistance  with  rolling walker     Activities of Daily Living:  · Lower Body Dressing: able to doff/don socks seated EOB with forward/lateral trunk lean with CGA for safety EOB   · Toileting: moderate assistance with assist for front jailene hygiene in stance with BUE supported at RW  · Bathing:  Moderate Assistance; fatigued during bathing tasks requiring assist with front/back  jailene region and posterior thighs while in stance with BUE supported at RW; able to clean all other regions in sitting      [facility] 6 Click ADL: 17    Treatment & Education:  Educated on functional transfer/ADL safety.      Patient left HOB elevated with all lines intact, call button in reach, nursing notified and telesitter in room. Education:      GOALS:   Multidisciplinary Problems     Occupational Therapy Goals        Problem: Occupational Therapy Goal    Goal Priority Disciplines Outcome Interventions   Occupational Therapy Goal     OT, PT/OT Ongoing (interventions implemented as appropriate)    Description:  Goals to be met by: 7/30/19     Patient will increase functional independence with ADLs by performing:    LE Dressing with Minimal Assistance and Assistive Devices as needed.  Grooming while standing at sink with Contact Guard Assistance.  Toileting from bedside commode with Moderate Assistance for hygiene and clothing management.  GOAL MET 7/17/19.  Bathing from  edge of bed with Minimal Assistance.  Toilet transfer to bedside commode with Stand-by Assistance.  GOAL MET 7/17/19.                       Time Tracking:     OT Date of Treatment: 07/17/19  OT Start Time: 1146  OT Stop Time: 1247  OT Total Time (min): 61 min    Billable Minutes:Self Care/Home Management 61    Lisa Cat, OT  7/17/2019

## 2019-07-17 NOTE — ASSESSMENT & PLAN NOTE
- patient being evaluated for Bariatric Surgery at [facility]  - inpatient consult to Nutritionist for recommendations for caloric intake  - continue to provide resources for healthy dietary choices

## 2019-07-17 NOTE — ASSESSMENT & PLAN NOTE
- patient has failed conservative treatment of this knee with physical therapy, steroid injections, and Synvisc injection  - recent gait instability related to worsening of pain   - per medical record review plans to have total knee arthroplasty dependent on weight loss and is currently enrolled at [facility] Bariatric program; she has missed appointments based on inability to ambulate and lack of caregiver support  - history of recent falls  - on admit, x-ray left knee without evidence for acute fracture or dislocation; no changes from prior exams  - Continue Gabapentin 300mg PO BID  - Continue Percocet home dose  - PT/OT evaluation and recommends skilled nursing facility

## 2019-07-17 NOTE — ASSESSMENT & PLAN NOTE
Related to gait instability from worsening degenerative joint disease left knee and morbid obesity

## 2019-07-17 NOTE — PLAN OF CARE
Problem: Adult Inpatient Plan of Care  Goal: Plan of Care Review  Outcome: Ongoing (interventions implemented as appropriate)     07/17/19 0513   Plan of Care Review   Plan of Care Reviewed With patient   Progress improving   Pt remains free of falls and injury this shift. Avasys camera in place for fall prevention. Bed alarm active and audible. Pt tolerated CPAP this shift. Pt requires standby assistance with transfers from bed to bedside commode. Able to make needs known, denies any pain or discomfort this shift. Call light in reach, bed in lowest position. Will continue to monitor.

## 2019-07-17 NOTE — PLAN OF CARE
Problem: Physical Therapy Goal  Goal: Physical Therapy Goal  Goals to be met by: 19    Patient will increase functional independence with mobility by performin. Supine to sit with min A with use of HB features   2. Sit to supine with min A with use of HB feature   3. Rolling R and L with min A with use of HB features  4. Sitting at edge of bed >5 minutes with min A.   5. PT will assess sit<>stand. UPDATED 19: Patient will t/f sit <> stand with SBA using RW and no verbal cues for hand placement  6. PT will assess t/f bed to chair. UPDATED 19: Patient will t/f bed to chair via stand-step t/f with SBA using RW and no verbal cues for safety  7. PT will assess ambulation. UPDATED 19: Patient will ambulate > 150' in open environment with SBA using RW with no verbal cues for safety      Outcome: Ongoing (interventions implemented as appropriate)  Assessed patient's mobility status 2/2 clearance from radiograph to WB through LLE. Patient fairly mobile regarding OOB mobility; patient is most limited with hygiene care 2/2 inability to wipe self. Dicussed d/c options with patient. Although she wants long term placement, patient does not want to go to long term nursing home. With that being said, would recommend post-acute therapy in home or within long term/assisted living facility pending patient's approval.

## 2019-07-17 NOTE — PLAN OF CARE
Problem: Adult Inpatient Plan of Care  Goal: Plan of Care Review  Outcome: Ongoing (interventions implemented as appropriate)  Pt resting comfortably this AM - remains free from falls - morning medications administered - pt eating and drinking adequately - voiding spontaneously - pt able to get up with assistance to use bedside commode - unable to clean herself independently - pt was seen by PT/OT today - pt was complaining of some dizziness earlier but stated it was because she took her lisinopril this AM instead of at bedtime - NP made aware and this dose was scheduled for bedtime in the future - pt now denies dizziness or lightheadedness - A&Ox4 - vitals stable - avasys camera remains in place - bed alarm set - commode at bedside - call light within reach - awaiting dinner - no complaints at this time - will continue to monitor

## 2019-07-17 NOTE — PT/OT/SLP PROGRESS
Physical Therapy Treatment    Patient Name:  Ca Coats   MRN:  [id number]    Recommendations:     Discharge Recommendations:  (Post acute therapy recommended; patient rquesting long-term placement but denied want for [facility]. Would benefit from SNF with transition to [facility] if agreeable.)   Discharge Equipment Recommendations: (Pending progress but if d/c'ed home will need RW and shower bench)   Barriers to discharge: Decreased caregiver support; previous hx of falls     Assessment:     Ca Coats is a 68 y.o. female admitted with a medical diagnosis of Primary osteoarthritis of left knee.  She presents with the following impairments/functional limitations:  weakness, impaired self care skills, impaired endurance, impaired functional mobilty.    Assessed patient's mobility status 2/2 clearance from radiograph to WB through LLE. Patient fairly mobile regarding OOB mobility; patient is most limited with hygiene care 2/2 inability to wipe self. Dicussed d/c options with patient. Although she wants long term placement, patient does not want to go to [facility] nursing home. With that being said, would recommend post acute therapy recommended; patient rquesting long-term placement but denied want for [facility]. Would benefit from SNF with transition to [facility] if agreeable.    Rehab Prognosis: Good; patient would benefit from acute skilled PT services to address these deficits and reach maximum level of function.    Recent Surgery: * No surgery found *      Plan:     During this hospitalization, patient to be seen 5 x/week to address the identified rehab impairments via therapeutic activities, therapeutic exercises, gait training, neuromuscular re-education and progress toward the following goals:    · Plan of Care Expires:  08/15/19    Subjective     Chief Complaint: No complaints expressed   Patient/Family Comments/goals: Requested WC follow while ambulating   Pain/Comfort:  Pain Rating  "1: 0/10  Pain Rating Post-Intervention 1: 0/10      Objective:     Communicated with RN/NP prior to session.  Patient found supine with peripheral IV upon PT entry to room.     General Precautions: Standard, fall   Orthopedic Precautions:Full weight bearing   Braces: N/A     Functional Mobility:  · Bed Mobility:  Use of HB features -- HOB elevated   · Scooting: stand by assistance  · Supine to Sit: stand by assistance  · Sit to Supine: stand by assistance  · Transfers:     · Sit to Stand:  contact guard assistance with rolling walker  · Toilet Transfer: contact guard assistance with  rolling walker  using  Step Transfer  · Gait:   · ambulated 120' with RW, CGA + w/c follow 2/2 fear of LLE "giving out"  · No buckling noted upon stance phase of LLE  · Good francesca  · Verbal cues for safety while turning   · Balance:   Static sitting: supervision  Static standing: CGA with RW    AM-PAC 6 CLICK MOBILITY  Turning over in bed (including adjusting bedclothes, sheets and blankets)?: 3  Sitting down on and standing up from a chair with arms (e.g., wheelchair, bedside commode, etc.): 3  Moving from lying on back to sitting on the side of the bed?: 3  Moving to and from a bed to a chair (including a wheelchair)?: 3  Need to walk in hospital room?: 3  Climbing 3-5 steps with a railing?: 2  Basic Mobility Total Score: 17       Therapeutic Activities and Exercises:  · Ambulated to commode with CGA but required total A for hygiene care post voiding     Patient left supine with all lines intact, call button in reach and RN notified..    GOALS:   Multidisciplinary Problems     Physical Therapy Goals        Problem: Physical Therapy Goal    Goal Priority Disciplines Outcome Goal Variances Interventions   Physical Therapy Goal     PT, PT/OT Ongoing (interventions implemented as appropriate)     Description:  Goals to be met by: 19    Patient will increase functional independence with mobility by performin. Supine to sit " with min A with use of HB features   2. Sit to supine with min A with use of HB feature   3. Rolling R and L with min A with use of HB features  4. Sitting at edge of bed >5 minutes with min A.   5. PT will assess sit<>stand. UPDATED 7/17/19: Patient will t/f sit <> stand with SBA using RW and no verbal cues for hand placement  6. PT will assess t/f bed to chair. UPDATED 7/17/19: Patient will t/f bed to chair via stand-step t/f with SBA using RW and no verbal cues for safety  7. PT will assess ambulation. UPDATED 7/17/19: Patient will ambulate > 150' in open environment with SBA using RW with no verbal cues for safety                        Time Tracking:     PT Received On: 07/17/19  PT Start Time: 0926     PT Stop Time: 0953  PT Total Time (min): 27 min     Billable Minutes: Therapeutic Activity 27    Treatment Type: Treatment  PT/PTA: PT     PTA Visit Number: 0     Deneen Callahan, PT  07/17/2019

## 2019-07-18 ENCOUNTER — TELEPHONE (OUTPATIENT)
Dept: BARIATRICS | Facility: CLINIC | Age: 69
End: 2019-07-18

## 2019-07-18 ENCOUNTER — TELEPHONE (OUTPATIENT)
Dept: PAIN MEDICINE | Facility: CLINIC | Age: 69
End: 2019-07-18

## 2019-07-18 PROBLEM — E07.9 THYROID DISEASE: Status: ACTIVE | Noted: 2019-07-18

## 2019-07-18 PROCEDURE — 99225 PR SUBSEQUENT OBSERVATION CARE,LEVEL II: ICD-10-PCS | Mod: ,,, | Performed by: NURSE PRACTITIONER

## 2019-07-18 PROCEDURE — 97530 THERAPEUTIC ACTIVITIES: CPT

## 2019-07-18 PROCEDURE — 25000003 PHARM REV CODE 250: Performed by: NURSE PRACTITIONER

## 2019-07-18 PROCEDURE — 25000003 PHARM REV CODE 250: Performed by: PHYSICIAN ASSISTANT

## 2019-07-18 PROCEDURE — 99900035 HC TECH TIME PER 15 MIN (STAT)

## 2019-07-18 PROCEDURE — 99225 PR SUBSEQUENT OBSERVATION CARE,LEVEL II: CPT | Mod: ,,, | Performed by: NURSE PRACTITIONER

## 2019-07-18 PROCEDURE — G0378 HOSPITAL OBSERVATION PER HR: HCPCS

## 2019-07-18 PROCEDURE — 97535 SELF CARE MNGMENT TRAINING: CPT | Mod: 59

## 2019-07-18 PROCEDURE — 94660 CPAP INITIATION&MGMT: CPT

## 2019-07-18 PROCEDURE — 97116 GAIT TRAINING THERAPY: CPT

## 2019-07-18 PROCEDURE — 97802 MEDICAL NUTRITION INDIV IN: CPT | Mod: 59

## 2019-07-18 PROCEDURE — 94761 N-INVAS EAR/PLS OXIMETRY MLT: CPT

## 2019-07-18 PROCEDURE — 63600175 PHARM REV CODE 636 W HCPCS: Performed by: PHYSICIAN ASSISTANT

## 2019-07-18 RX ADMIN — SUCRALFATE 1 G: 1 TABLET ORAL at 12:07

## 2019-07-18 RX ADMIN — PANTOPRAZOLE SODIUM 40 MG: 40 TABLET, DELAYED RELEASE ORAL at 08:07

## 2019-07-18 RX ADMIN — AMLODIPINE BESYLATE 5 MG: 5 TABLET ORAL at 08:07

## 2019-07-18 RX ADMIN — SUCRALFATE 1 G: 1 TABLET ORAL at 04:07

## 2019-07-18 RX ADMIN — CALCIUM CARBONATE 500 MG (1,250 MG)-VITAMIN D3 200 UNIT TABLET 1 TABLET: at 08:07

## 2019-07-18 RX ADMIN — LISINOPRIL 40 MG: 20 TABLET ORAL at 09:07

## 2019-07-18 RX ADMIN — GABAPENTIN 300 MG: 300 CAPSULE ORAL at 09:07

## 2019-07-18 RX ADMIN — PANTOPRAZOLE SODIUM 40 MG: 40 TABLET, DELAYED RELEASE ORAL at 09:07

## 2019-07-18 RX ADMIN — ATORVASTATIN CALCIUM 40 MG: 20 TABLET, FILM COATED ORAL at 08:07

## 2019-07-18 RX ADMIN — DOCUSATE SODIUM 100 MG: 100 CAPSULE, LIQUID FILLED ORAL at 08:07

## 2019-07-18 RX ADMIN — SUCRALFATE 1 G: 1 TABLET ORAL at 05:07

## 2019-07-18 RX ADMIN — ENOXAPARIN SODIUM 40 MG: 100 INJECTION SUBCUTANEOUS at 08:07

## 2019-07-18 RX ADMIN — OXYCODONE HYDROCHLORIDE AND ACETAMINOPHEN 1 TABLET: 5; 325 TABLET ORAL at 08:07

## 2019-07-18 RX ADMIN — DOCUSATE SODIUM 100 MG: 100 CAPSULE, LIQUID FILLED ORAL at 09:07

## 2019-07-18 RX ADMIN — SUCRALFATE 1 G: 1 TABLET ORAL at 09:07

## 2019-07-18 RX ADMIN — GABAPENTIN 300 MG: 300 CAPSULE ORAL at 08:07

## 2019-07-18 RX ADMIN — FERROUS SULFATE TAB EC 325 MG (65 MG FE EQUIVALENT) 325 MG: 325 (65 FE) TABLET DELAYED RESPONSE at 08:07

## 2019-07-18 RX ADMIN — ENOXAPARIN SODIUM 40 MG: 100 INJECTION SUBCUTANEOUS at 09:07

## 2019-07-18 NOTE — CONSULTS
"[facility]  Adult Nutrition  Consult Note    SUMMARY       Recommendations    Recommendation:   1. Continue cardiac diet  2. Recommend beneprotein tid (pt consumes high protein, reduced calorie diet at home in preparation for possible bariatric surgery)    Goals:   1. Promote gradual weight loss   2. Maintain nutrition related labs wnl    Nutrition Goal Status: new  Communication of RD Recs: other (comment)(POC)    Reason for Assessment    Reason For Assessment: consult  Diagnosis: other (see comments)(Primary osteoarthritis of left knee)  Relevant Medical History: HTN, morbid obesity, LAURA on CPAP, Depression, Hypothyroidism, chronic knee pain    General Information Comments: Pt is morbidly obese, has been seeing OP RD for possible bariatric surgery. Pt has gained wt over the past year per chart. Eating well, NFPE not indicated. Pt has been following a reduced calorie diet in preparation for possible surgery. Takes low carbohydrate protein supplements at home; will provide beneprotein while in the hospital. Endorses some constipation, LBM 7/15.     Nutrition Discharge Planning: d/c on cardiac diet    Nutrition Risk Screen    Nutrition Risk Screen: no indicators present    Nutrition/Diet History    Patient Reported Diet/Restrictions/Preferences: other (see comments)(high protein reduced calorie diet)  Spiritual, Cultural Beliefs, Scientologist Practices, Values that Affect Care: no    Anthropometrics    Temp: 98.2 °F (36.8 °C)  Height Method: Stated  Height: 5' 6" (167.6 cm)  Height (inches): 66 in  Weight Method: Standard Scale  Weight: (!) 170.4 kg (375 lb 10.6 oz)  Weight (lb): (!) 375.67 lb  Ideal Body Weight (IBW), Female: 130 lb  % Ideal Body Weight, Female (lb): 288.98 lb  BMI (Calculated): 60.8  BMI Grade: greater than 40 - morbid obesity     Lab/Procedures/Meds    Pertinent Labs: Reviewed (unremarkable)     Pertinent Meds: Reviewed  Scheduled Meds:   amLODIPine  5 mg Oral Daily    " atorvastatin  40 mg Oral Daily    calcium-vitamin D3  1 tablet Oral Daily    docusate sodium  100 mg Oral BID    enoxparin  40 mg Subcutaneous Q12H    ferrous sulfate  325 mg Oral Daily    gabapentin  300 mg Oral BID    lisinopril  40 mg Oral QHS    pantoprazole  40 mg Oral BID    sucralfate  1 g Oral QID (AC & HS)     Estimated/Assessed Needs    Weight Used For Calorie Calculations: (!) 170.4 kg (375 lb 10.6 oz)  Energy Calorie Requirements (kcal): 1712  Energy Need Method: Warren-St Jeor(AF 1.2 - 1000kcal for wt loss)  Protein Requirements:  gm/d (1.5-1.8 gm/kg IBW)  Weight Used For Protein Calculations: 59 kg (130 lb)  Fluid Requirements (mL): 1712(or per team)  Estimated Fluid Requirement Method: RDA Method    Nutrition Prescription Ordered    Current Diet Order: cardiac    Evaluation of Received Nutrient/Fluid Intake    % Intake of Estimated Energy Needs: 50 - 75 %  % Meal Intake: 50 - 75 %    Nutrition Risk    Level of Risk/Frequency of Follow-up: low     Assessment and Plan    Nutrition Problem  Obesity  Related to (etiology):   Excess energy intake  Signs and Symptoms (as evidenced by):   BMI >60   Interventions/Recommendations (treatment strategy):  Collaboration with Providers  Nutrition Diagnosis Status:   New    Monitor and Evaluation    Food and Nutrient Intake: energy intake, food and beverage intake  Food and Nutrient Adminstration: diet order  Knowledge/Beliefs/Attitudes: food and nutrition knowledge/skill, beliefs and attitudes  Anthropometric Measurements: weight, weight change  Biochemical Data, Medical Tests and Procedures: electrolyte and renal panel, gastrointestinal profile, glucose/endocrine profile, inflammatory profile, lipid profile  Nutrition-Focused Physical Findings: overall appearance, extremities, muscles and bones, skin     Malnutrition Assessment     Does not meet malnutrition criteria    Nutrition Follow-Up    RD Follow-up?: Yes

## 2019-07-18 NOTE — PT/OT/SLP PROGRESS
"Occupational Therapy   Treatment    Name: Ca Coats  MRN: [id number]  Admitting Diagnosis:  Primary osteoarthritis of left knee       Recommendations:     Discharge Recommendations: nursing facility, skilled  Discharge Equipment Recommendations:  other (see comments)(defer to next level of care; though currently needs bariatric DME for TTB, BSC, and RW)  Barriers to discharge:  Decreased caregiver support, Other (Comment)(current functional level)    Assessment:   Discussed purpose and benefits of skilled nursing facility at length.  Pt. Very anxious about having to cancel surgeries and miss appointments while in SNF; NP present providing reassurance.  Pt. Even becoming tearful and crying with comfort and support provided, states, "I don't want to die alone in my home."  Step transfer bed>BSC with SBA.  Requiring assist for thorough front jailene hygiene and lifting abdominal pannus while in stance with BUE supported at RW.  Ambulated from bed<>sink with bariatric RW SBA and chair follow for safety.  Washed hands in stance at sink requiring orientation to soap dispenser and pedal controlled water faucet at sink with SBA for safety - BUE forearm supported on sink during hand hygiene task.  Required seated rest break after ambulation and standing ADL.  Combing hair seated EOB.  Progressing towards goals.  To benefit from continued acute care OT services to increase independence in self-care/functional transfers.  Continue POC.     Ca Coats is a 68 y.o. female with a medical diagnosis of Primary osteoarthritis of left knee.  She presents with below deficits decreasing independence in self-care/functional transfers. Performance deficits affecting function are weakness, impaired endurance, impaired self care skills, impaired functional mobilty, gait instability, impaired balance, pain, decreased ROM, decreased lower extremity function, other (comment)(morbid obesity).     Rehab Prognosis:  Good; " patient would benefit from acute skilled OT services to address these deficits and reach maximum level of function.       Plan:     Patient to be seen 5 x/week to address the above listed problems via self-care/home management, therapeutic activities, therapeutic exercises  · Plan of Care Expires: 07/30/19  · Plan of Care Reviewed with: patient    Subjective     Pain/Comfort:  · Pain Rating 1: 6/10  · Location - Side 1: Left  · Location - Orientation 1: generalized(described as throbbing)  · Location 1: knee  · Pain Addressed 1: Distraction, Cessation of Activity, Nurse notified  · Pain Rating Post-Intervention 1: 6/10    Objective:     Communicated with: Nursing prior to session.  Patient found HOB elevated with peripheral IV upon OT entry to room.    General Precautions: Standard, fall, other (see comments)(cardiac diet)   Orthopedic Precautions:N/A   Braces: N/A     Occupational Performance:     Bed Mobility:    · Patient completed Supine to Sit with stand by assistance with HOB elevated     Functional Mobility/Transfers:  · Patient completed Sit <> Stand Transfer with stand by assistance  with  rolling walker   · Patient completed Toilet Transfer Step Transfer technique with stand by assistance with  rolling walker and bedside commode  · Functional Mobility: Ambulated from bed<>sink with bariatric RW SBA and chair follow for safety.      Activities of Daily Living:  Grooming: stand by assistance; washed hands in stance at sink requiring orientation to soap dispenser and pedal controlled water faucet at sink with SBA for safety - BUE forearm supported on sink during hand hygiene task.    Toileting: Requiring assist for thorough front jailene hygiene and lifting abdominal pannus while in stance with BUE supported at RW    [facility] 6 Click ADL: 17    Treatment & Education:  -Educated on discharge disposition, functional transfer/ADL safety  -Shown options for bathing DME - TTB and explained use to eliminate obstacle of  stepping into tub  -Comfort/support provided as Pt. Crying and becoming tearful during session  -Ambulation task to increase activity tolerance with 1 seated rest break needed after ambulation/standing task      Patient left seated EOB with all lines intact, call button in reach and nursing notifiedEducation:      GOALS:   Multidisciplinary Problems     Occupational Therapy Goals        Problem: Occupational Therapy Goal    Goal Priority Disciplines Outcome Interventions   Occupational Therapy Goal     OT, PT/OT Ongoing (interventions implemented as appropriate)    Description:  Goals to be met by: 7/30/19     Patient will increase functional independence with ADLs by performing:    LE Dressing with Minimal Assistance and Assistive Devices as needed.  Grooming while standing at sink with Contact Guard Assistance.  Toileting from bedside commode with Moderate Assistance for hygiene and clothing management.  GOAL MET 7/17/19.  Bathing from  edge of bed with Minimal Assistance.  Toilet transfer to bedside commode with Stand-by Assistance.  GOAL MET 7/17/19.                       Time Tracking:     OT Date of Treatment: 07/18/19  OT Start Time: 1656  OT Stop Time: 1754  OT Total Time (min): 58 min (NP interjected X 20-MINUTES)    Billable Minutes:Self Care/Home Management 15  Therapeutic Activity 23    Lisa Cat, ALEX  7/18/2019

## 2019-07-18 NOTE — PLAN OF CARE
Problem: Adult Inpatient Plan of Care  Goal: Plan of Care Review  Outcome: Ongoing (interventions implemented as appropriate)     07/18/19 0625   Plan of Care Review   Plan of Care Reviewed With patient   Progress improving   Pt remains free of falls and injury this shift. Avasys camera in place for fall prevention. Bed alarm active and audible. Pt tolerated CPAP this shift. Pt requires standby assistance with transfers from bed to bedside commode. Denies any pain or discomfort this shift. Call light in reach, bed in lowest position. Will continue to monitor.

## 2019-07-18 NOTE — TELEPHONE ENCOUNTER
7/16: Pt reports unable to make nutrition visit due to hospitalization. She had questions about EKG and labs - if could be completed inpatient while she is there.  Discussed her case with team and stated we can not do outpatient labs/testing as an inpatient.    7/18: called pt to discuss results of 7/16 team meeting. Pt agreed to complete outpatient and will call us once she is ready to reschedule.

## 2019-07-18 NOTE — TELEPHONE ENCOUNTER
----- Message from Dorcas Estrada sent at 7/18/2019  4:04 PM CDT -----  Contact: Sravanthi-   Name of Who is Calling: Sravanthi      What is the request in detail:  pt is currently admitted in the hospital. She expressed to her nurse that she would like to still have her procedure on tomorrow. Nursing staff  is calling to see if it could be done inpatient. Please contact to further discuss and advise.      Can the clinic reply by [facility]: N       What Number to Call Back if not in [facility]: [place] - [phone]

## 2019-07-18 NOTE — TELEPHONE ENCOUNTER
Spoke with nursing staff to inform that patient could have her procedure tomorrow per Dr Vidal, staff would have to bring her to the procedure area

## 2019-07-18 NOTE — PT/OT/SLP PROGRESS
"Physical Therapy Treatment    Patient Name:  Ca Coats   MRN:  [id number]    Recommendations:     Discharge Recommendations:  nursing facility, skilled(with transition to long term placement)   Discharge Equipment Recommendations: walker, rolling, commode, tub bench(bariatric sizes)   Barriers to discharge: Decreased caregiver support    Assessment:     Ca Coats is a 68 y.o. female admitted with a medical diagnosis of Primary osteoarthritis of left knee.  She presents with the following impairments/functional limitations:  weakness, impaired endurance, impaired self care skills, impaired functional mobilty, gait instability, impaired balance, pain, decreased lower extremity function, decreased ROM ;pt with good mobility today,though needing CGA and w/c follow for safety.    Rehab Prognosis: Good; patient would benefit from acute skilled PT services to address these deficits and reach maximum level of function.    Recent Surgery: * No surgery found *      Plan:     During this hospitalization, patient to be seen 5 x/week to address the identified rehab impairments via gait training, therapeutic activities, therapeutic exercises, neuromuscular re-education and progress toward the following goals:    · Plan of Care Expires:  08/15/19    Subjective     Chief Complaint: LLE pain  Patient/Family Comments/goals: "I need my shots in my L knee. I was scheduled to get them tomorrow at the pain clinic"  Pain/Comfort:  · Pain Rating 1: 10/10  · Location - Side 1: Left  · Location - Orientation 1: generalized  · Location 1: groin(and lat hip area)  · Pain Addressed 1: Pre-medicate for activity, Reposition, Distraction, Nurse notified  · Pain Rating Post-Intervention 1: 10/10      Objective:     Communicated with nurse prior to session.  Patient found HOB elevated with peripheral IV upon PT entry to room.     General Precautions: Standard, fall   Orthopedic Precautions:N/A   Braces: N/A     Functional " "Mobility:  · Bed Mobility:     · Supine to Sit: contact guard assistance and HOB elevated, pt using bedrails  · Transfers:     · Sit to Stand:  contact guard assistance with rolling walker  · Gait: amb'd 80' x 2 with bariatric RW and CGA, w/c following for safety (per pt's request).       AM-PAC 6 CLICK MOBILITY  Turning over in bed (including adjusting bedclothes, sheets and blankets)?: 3  Sitting down on and standing up from a chair with arms (e.g., wheelchair, bedside commode, etc.): 3  Moving from lying on back to sitting on the side of the bed?: 3  Moving to and from a bed to a chair (including a wheelchair)?: 3  Need to walk in hospital room?: 3  Climbing 3-5 steps with a railing?: 2  Basic Mobility Total Score: 17       Therapeutic Activities and Exercises:   pt perf'd seated LE ex's of AP's, LAQ"s x 10 ea.   HR:80's, O2:96% on RA with amb.      Patient left up in chair with all lines intact, call button in reach and nurse notified..    GOALS:   Multidisciplinary Problems     Physical Therapy Goals        Problem: Physical Therapy Goal    Goal Priority Disciplines Outcome Goal Variances Interventions   Physical Therapy Goal     PT, PT/OT Ongoing (interventions implemented as appropriate)     Description:  Goals to be met by: 19    Patient will increase functional independence with mobility by performin. Supine to sit with min A with use of HB features   2. Sit to supine with min A with use of HB feature   3. Rolling R and L with min A with use of HB features  4. Sitting at edge of bed >5 minutes with min A.   5. PT will assess sit<>stand. UPDATED 19: Patient will t/f sit <> stand with SBA using RW and no verbal cues for hand placement  6. PT will assess t/f bed to chair. UPDATED 19: Patient will t/f bed to chair via stand-step t/f with SBA using RW and no verbal cues for safety  7. PT will assess ambulation. UPDATED 19: Patient will ambulate > 150' in open environment with SBA using " RW with no verbal cues for safety                        Time Tracking:     PT Received On: 07/18/19  PT Start Time: 1201     PT Stop Time: 1228  PT Total Time (min): 27 min     Billable Minutes: Gait Training 18 and Therapeutic Activity 9    Treatment Type: Treatment  PT/PTA: PTA     PTA Visit Number: 1     Darby Dias PTA  07/18/2019

## 2019-07-18 NOTE — PLAN OF CARE
Problem: Adult Inpatient Plan of Care  Goal: Plan of Care Review  1. Continue cardiac diet  2. Recommend beneprotein tid (pt consumes high protein, reduced calorie diet at home in preparation for possible bariatric surgery)     Goals:   1. Promote gradual weight loss   2. Maintain nutrition related labs wnl

## 2019-07-18 NOTE — PLAN OF CARE
JENNIFER reached out to [facility] and requested [facility] apply for auth.     Unlikely patient will get SNF has PT/OT notes have that it is patient preference for long term care SNF vs assistance living.     Hospital case management does not place patients in assistance living homes. Assistance living in [place] is expensive and affordable housing has a waiting list.     CM will continue to follow.      07/18/19 9708   Post-Acute Status   Post-Acute Authorization Placement   Post-Acute Placement Status Pending Payor Review

## 2019-07-18 NOTE — PLAN OF CARE
Problem: Adult Inpatient Plan of Care  Goal: Plan of Care Review  Outcome: Ongoing (interventions implemented as appropriate)  Patient in no apparent distress. Sat's 94-95  % on room air. Patient placed on CPAP with settings as documented for night . Will continue to monitor.

## 2019-07-18 NOTE — PLAN OF CARE
Problem: Adult Inpatient Plan of Care  Goal: Plan of Care Review  Outcome: Ongoing (interventions implemented as appropriate)  Pt A&Ox4 - in supine position with HOB elevated - pt seen today by PT/OT - able to ambulate to bedside commode with some assistance - pt still complaining of knee pain but refusing pain medication - states she wants to get her knee injection tomorrow with her pain management doctor - remains free from falls - eating and drinking adequately - voiding spontaneously - vital signs stable - awaiting meal tray - no other complaints at this time - will continue to monitor

## 2019-07-18 NOTE — PLAN OF CARE
Problem: Physical Therapy Goal  Goal: Physical Therapy Goal  Goals to be met by: 19    Patient will increase functional independence with mobility by performin. Supine to sit with min A with use of HB features   2. Sit to supine with min A with use of HB feature   3. Rolling R and L with min A with use of HB features  4. Sitting at edge of bed >5 minutes with min A.   5. PT will assess sit<>stand. UPDATED 19: Patient will t/f sit <> stand with SBA using RW and no verbal cues for hand placement  6. PT will assess t/f bed to chair. UPDATED 19: Patient will t/f bed to chair via stand-step t/f with SBA using RW and no verbal cues for safety  7. PT will assess ambulation. UPDATED 19: Patient will ambulate > 150' in open environment with SBA using RW with no verbal cues for safety       Pt progressing towards goals, sup to sit CGA with HOB partially up and pt using rail, sit to stand CGA with RW, amb'd 80' x 2 with RW and CGA, w/c following for safety. HR:80's, O2:96% on RA with amb. Recommend cont PT in SNF (with transition to long term placement).

## 2019-07-18 NOTE — PLAN OF CARE
"Problem: Occupational Therapy Goal  Goal: Occupational Therapy Goal  Goals to be met by: 7/30/19     Patient will increase functional independence with ADLs by performing:    LE Dressing with Minimal Assistance and Assistive Devices as needed.  Grooming while standing at sink with Contact Guard Assistance.  Toileting from bedside commode with Moderate Assistance for hygiene and clothing management.  GOAL MET 7/17/19.  Bathing from  edge of bed with Minimal Assistance.  Toilet transfer to bedside commode with Stand-by Assistance.  GOAL MET 7/17/19.      Outcome: Ongoing (interventions implemented as appropriate)  Discussed purpose and benefits of skilled nursing facility at length.  Pt. Very anxious about having to cancel surgeries and miss appointments while in SNF; NP present providing reassurance.  Pt. Even becoming tearful and crying with comfort and support provided, states, "I don't want to die alone in my home."  Step transfer bed>BSC with SBA.  Requiring assist for thorough front jailene hygiene and lifting abdominal pannus while in stance with BUE supported at RW.  Ambulated from bed<>sink with bariatric RW SBA and chair follow for safety.  Washed hands in stance at sink requiring orientation to soap dispenser and pedal controlled water faucet at sink with SBA for safety - BUE forearm supported on sink during hand hygiene task.  Required seated rest break after ambulation and standing ADL.  Combing hair seated EOB.  Progressing towards goals.  To benefit from continued acute care OT services to increase independence in self-care/functional transfers.  Continue POC.       "

## 2019-07-18 NOTE — NURSING
Notified pain management (Dorcas) regarding patient scheduled for knee nerve block tomorrow (7/19/19) afternoon. Patient still wants the injection as scheduled, requesting Dr MAY Vidal be notified. Message given to Dorcas who stated she will have the office call back.

## 2019-07-18 NOTE — PT/OT/SLP PROGRESS
Occupational Therapy      Patient Name:  Ca Coats   MRN:  [id number]    Patient not seen today secondary to working with PTA at this time. Will follow-up later.    Lisa Cat, OT  7/18/2019

## 2019-07-19 VITALS
HEIGHT: 66 IN | WEIGHT: 293 LBS | SYSTOLIC BLOOD PRESSURE: 129 MMHG | OXYGEN SATURATION: 96 % | DIASTOLIC BLOOD PRESSURE: 69 MMHG | TEMPERATURE: 98 F | BODY MASS INDEX: 47.09 KG/M2 | RESPIRATION RATE: 20 BRPM | HEART RATE: 102 BPM

## 2019-07-19 DIAGNOSIS — G89.29 CHRONIC PAIN OF LEFT KNEE: Primary | ICD-10-CM

## 2019-07-19 DIAGNOSIS — M25.562 CHRONIC PAIN OF LEFT KNEE: Primary | ICD-10-CM

## 2019-07-19 LAB
ANION GAP SERPL CALC-SCNC: 11 MMOL/L (ref 8–16)
BASOPHILS # BLD AUTO: 0.04 K/UL (ref 0–0.2)
BASOPHILS NFR BLD: 0.3 % (ref 0–1.9)
BUN SERPL-MCNC: 20 MG/DL (ref 8–23)
CALCIUM SERPL-MCNC: 10.1 MG/DL (ref 8.7–10.5)
CHLORIDE SERPL-SCNC: 106 MMOL/L (ref 95–110)
CO2 SERPL-SCNC: 23 MMOL/L (ref 23–29)
CREAT SERPL-MCNC: 0.8 MG/DL (ref 0.5–1.4)
DIFFERENTIAL METHOD: ABNORMAL
EOSINOPHIL # BLD AUTO: 0.1 K/UL (ref 0–0.5)
EOSINOPHIL NFR BLD: 1.1 % (ref 0–8)
ERYTHROCYTE [DISTWIDTH] IN BLOOD BY AUTOMATED COUNT: 12 % (ref 11.5–14.5)
EST. GFR  (AFRICAN AMERICAN): >60 ML/MIN/1.73 M^2
EST. GFR  (NON AFRICAN AMERICAN): >60 ML/MIN/1.73 M^2
GLUCOSE SERPL-MCNC: 92 MG/DL (ref 70–110)
HCT VFR BLD AUTO: 36.1 % (ref 37–48.5)
HGB BLD-MCNC: 11.7 G/DL (ref 12–16)
IMM GRANULOCYTES # BLD AUTO: 0.04 K/UL (ref 0–0.04)
IMM GRANULOCYTES NFR BLD AUTO: 0.3 % (ref 0–0.5)
LYMPHOCYTES # BLD AUTO: 3.8 K/UL (ref 1–4.8)
LYMPHOCYTES NFR BLD: 32.8 % (ref 18–48)
MCH RBC QN AUTO: 31.1 PG (ref 27–31)
MCHC RBC AUTO-ENTMCNC: 32.4 G/DL (ref 32–36)
MCV RBC AUTO: 96 FL (ref 82–98)
MONOCYTES # BLD AUTO: 1 K/UL (ref 0.3–1)
MONOCYTES NFR BLD: 8.5 % (ref 4–15)
NEUTROPHILS # BLD AUTO: 6.7 K/UL (ref 1.8–7.7)
NEUTROPHILS NFR BLD: 57 % (ref 38–73)
NRBC BLD-RTO: 0 /100 WBC
PLATELET # BLD AUTO: 246 K/UL (ref 150–350)
PMV BLD AUTO: 10.9 FL (ref 9.2–12.9)
POTASSIUM SERPL-SCNC: 3.9 MMOL/L (ref 3.5–5.1)
RBC # BLD AUTO: 3.76 M/UL (ref 4–5.4)
SODIUM SERPL-SCNC: 140 MMOL/L (ref 136–145)
T4 FREE SERPL-MCNC: 0.93 NG/DL (ref 0.71–1.51)
TB INDURATION 48 - 72 HR READ: 0 MM
TSH SERPL DL<=0.005 MIU/L-ACNC: 1.17 UIU/ML (ref 0.4–4)
WBC # BLD AUTO: 11.7 K/UL (ref 3.9–12.7)

## 2019-07-19 PROCEDURE — 99217 PR OBSERVATION CARE DISCHARGE: ICD-10-PCS | Mod: ,,, | Performed by: NURSE PRACTITIONER

## 2019-07-19 PROCEDURE — 97116 GAIT TRAINING THERAPY: CPT

## 2019-07-19 PROCEDURE — 80048 BASIC METABOLIC PNL TOTAL CA: CPT

## 2019-07-19 PROCEDURE — 25000003 PHARM REV CODE 250: Performed by: NURSE PRACTITIONER

## 2019-07-19 PROCEDURE — 97530 THERAPEUTIC ACTIVITIES: CPT

## 2019-07-19 PROCEDURE — 63600175 PHARM REV CODE 636 W HCPCS: Performed by: PHYSICIAN ASSISTANT

## 2019-07-19 PROCEDURE — 99900035 HC TECH TIME PER 15 MIN (STAT)

## 2019-07-19 PROCEDURE — 94660 CPAP INITIATION&MGMT: CPT

## 2019-07-19 PROCEDURE — 84439 ASSAY OF FREE THYROXINE: CPT

## 2019-07-19 PROCEDURE — 97535 SELF CARE MNGMENT TRAINING: CPT

## 2019-07-19 PROCEDURE — 99217 PR OBSERVATION CARE DISCHARGE: CPT | Mod: ,,, | Performed by: NURSE PRACTITIONER

## 2019-07-19 PROCEDURE — 94761 N-INVAS EAR/PLS OXIMETRY MLT: CPT

## 2019-07-19 PROCEDURE — 85025 COMPLETE CBC W/AUTO DIFF WBC: CPT

## 2019-07-19 PROCEDURE — G0378 HOSPITAL OBSERVATION PER HR: HCPCS

## 2019-07-19 PROCEDURE — 84443 ASSAY THYROID STIM HORMONE: CPT

## 2019-07-19 PROCEDURE — 25000003 PHARM REV CODE 250: Performed by: PHYSICIAN ASSISTANT

## 2019-07-19 PROCEDURE — 36415 COLL VENOUS BLD VENIPUNCTURE: CPT

## 2019-07-19 RX ORDER — CLONAZEPAM 1 MG/1
1 TABLET ORAL ONCE
Status: COMPLETED | OUTPATIENT
Start: 2019-07-19 | End: 2019-07-19

## 2019-07-19 RX ADMIN — OXYCODONE HYDROCHLORIDE AND ACETAMINOPHEN 1 TABLET: 5; 325 TABLET ORAL at 09:07

## 2019-07-19 RX ADMIN — DOCUSATE SODIUM 100 MG: 100 CAPSULE, LIQUID FILLED ORAL at 09:07

## 2019-07-19 RX ADMIN — ATORVASTATIN CALCIUM 40 MG: 20 TABLET, FILM COATED ORAL at 09:07

## 2019-07-19 RX ADMIN — CALCIUM CARBONATE 500 MG (1,250 MG)-VITAMIN D3 200 UNIT TABLET 1 TABLET: at 09:07

## 2019-07-19 RX ADMIN — GABAPENTIN 300 MG: 300 CAPSULE ORAL at 09:07

## 2019-07-19 RX ADMIN — CLONAZEPAM 1 MG: 1 TABLET ORAL at 01:07

## 2019-07-19 RX ADMIN — FERROUS SULFATE TAB EC 325 MG (65 MG FE EQUIVALENT) 325 MG: 325 (65 FE) TABLET DELAYED RESPONSE at 09:07

## 2019-07-19 RX ADMIN — AMLODIPINE BESYLATE 5 MG: 5 TABLET ORAL at 09:07

## 2019-07-19 RX ADMIN — SUCRALFATE 1 G: 1 TABLET ORAL at 08:07

## 2019-07-19 RX ADMIN — PANTOPRAZOLE SODIUM 40 MG: 40 TABLET, DELAYED RELEASE ORAL at 09:07

## 2019-07-19 NOTE — PROGRESS NOTES
"[facility] Medicine  Progress Note    Patient Name: Ca Coats  MRN: [id number]  Patient Class: OP- Observation   Admission Date: 7/15/2019  Length of Stay: 0 days  Attending Physician: Yin Cedillo MD  Primary Care Provider: Primary Doctor No        Subjective:     Principal Problem:Primary osteoarthritis of left knee      HPI:  Per VADIM Gannon PA:  67 y/o female with Hx of HTN, morbid obesity, LAURA on CPAP, Depression, Hypothyroidism, chronic knee pain followed by Pain management presents to ED with c/o fall today. States she has chronic left knee pain, and felt her knee "give out".  Patient states she has been having multiple falls due to her knee. Reports she has home health but no one is at home with her. Reports that she is afraid she is going to fall and hurt herself. Reports she calls 911 whenever she falls. She doesn't fell safe going home. Denies LOC, fever, chills, CP, SOB, N/V/D, dysuria, numbness.  Per ED paramedics stated that her house is extremely small difficult to get around. Admitted to OBS for PT/OT, placement.        Overview/Hospital Course:  No notes on file    Interval History: continues with left knee pain and refusing pain medications at time; working well with PT/OT and agree with skilled nursing placement; medical records requested from [facility] re: thyroidectomy scheduled 7/24/2019    Review of Systems   Constitutional: Positive for activity change and fatigue. Negative for chills and fever.   HENT: Negative for congestion and trouble swallowing.    Eyes: Negative for photophobia and visual disturbance.   Respiratory: Negative for cough and shortness of breath.    Cardiovascular: Negative for chest pain, palpitations and leg swelling.   Gastrointestinal: Negative for abdominal pain, diarrhea, nausea and vomiting.   Endocrine: Negative.    Genitourinary: Negative for dysuria, frequency and urgency.   Musculoskeletal: Positive for " arthralgias and gait problem (multiple falls due to left knee weakness). Negative for joint swelling.   Skin: Negative for color change and wound.   Neurological: Negative for syncope, weakness, numbness and headaches.   Hematological: Does not bruise/bleed easily.   Psychiatric/Behavioral: Positive for dysphoric mood. The patient is nervous/anxious.      Objective:     Vital Signs (Most Recent):  Temp: 98.6 °F (37 °C) (07/18/19 2018)  Pulse: 76 (07/18/19 2018)  Resp: 18 (07/18/19 2018)  BP: 134/68 (07/18/19 2018)  SpO2: 96 % (07/18/19 2018) Vital Signs (24h Range):  Temp:  [97.9 °F (36.6 °C)-99.1 °F (37.3 °C)] 98.6 °F (37 °C)  Pulse:  [70-90] 76  Resp:  [16-20] 18  SpO2:  [94 %-96 %] 96 %  BP: (122-145)/(62-77) 134/68     Weight: (!) 170.4 kg (375 lb 10.6 oz)  Body mass index is 60.63 kg/m².    Intake/Output Summary (Last 24 hours) at 7/18/2019 2203  Last data filed at 7/18/2019 1702  Gross per 24 hour   Intake 480 ml   Output 900 ml   Net -420 ml      Physical Exam   Constitutional: She is oriented to person, place, and time. She appears well-developed and well-nourished. No distress.   Morbidly obese female   HENT:   Head: Normocephalic and atraumatic.   Mouth/Throat: Oropharynx is clear and moist.   Eyes: Pupils are equal, round, and reactive to light. Conjunctivae and EOM are normal. No scleral icterus.   Neck: Normal range of motion. Neck supple. No JVD present.   Cardiovascular: Normal rate, regular rhythm, normal heart sounds and intact distal pulses.   Pulmonary/Chest: Effort normal and breath sounds normal. No respiratory distress.   Abdominal: Soft. Bowel sounds are normal. There is no tenderness.   obese   Musculoskeletal: She exhibits no edema or deformity.        Left knee: She exhibits swelling (mild). She exhibits no ecchymosis, no deformity and no erythema. Tenderness found. No patellar tendon tenderness noted.   Neurological: She is alert and oriented to person, place, and time.   Skin: Skin is  warm and dry. Capillary refill takes less than 2 seconds. No erythema.   Psychiatric: Her speech is normal and behavior is normal. Judgment and thought content normal. Her mood appears anxious.   Tearful during exam; concerned she cannot take care of herself   Nursing note and vitals reviewed.      Significant Labs:   All pertinent labs within the past 24 hours have been reviewed.    Significant Imaging: I have reviewed all pertinent imaging results/findings within the past 24 hours.             Assessment/Plan:      * Primary osteoarthritis of left knee  - patient has failed conservative treatment of this knee with physical therapy, steroid injections, and Synvisc injection  - recent gait instability related to worsening of pain   - per medical record review plans to have total knee arthroplasty dependent on weight loss and is currently enrolled at [facility] program; she has missed appointments based on inability to ambulate and lack of caregiver support  - history of recent falls  - on admit, x-ray left knee without evidence for acute fracture or dislocation; no changes from prior exams  - Continue Gabapentin 300mg PO BID  - Continue Percocet home dose  - PT/OT evaluation and recommends skilled nursing facility    At high risk for injury related to fall  Related to gait instability from worsening degenerative joint disease left knee and morbid obesity      Gait instability  - patient morbidly obese with chronic degenerative joint disease of left knee and recent history of multiple falls related to gait instability  - debility related to fear of falling; fall risk  - PT/OT consulted and agree with recommendation for skilled nursing facility placement          Morbid obesity  - patient being evaluated for Bariatric Surgery at [facility]  - inpatient consult to Nutritionist for recommendations for caloric intake  - continue to provide resources for healthy dietary  choices      Obstructive sleep apnea  - CPAP nightly with settings per respiratory      Essential hypertension  - currently normotensive   - resume home medications  - amlodipine 5 mg, lisinopril 40 mg  - monitor      Hyperlipidemia  - continue home atorvastatin 40 mg daily    Thyroid disease  - patient with history of thyroid disease and follows Endocrinology at [facility]  - per patient report, she is scheduled for thyroidectomy on July 24, 2019 at [facility]   - awaiting call from Endocrinology regarding surgery details  - TSH and free T4 pending with morning labs      VTE Risk Mitigation (From admission, onward)        Ordered     enoxaparin injection 40 mg  Every 12 hours      07/15/19 1714     IP VTE HIGH RISK PATIENT  Once      07/15/19 1714     Place sequential compression device  Until discontinued      07/15/19 1714                Marci Leggett NP  Department of Hospital Medicine   [facility]

## 2019-07-19 NOTE — PLAN OF CARE
Problem: Physical Therapy Goal  Goal: Physical Therapy Goal  Goals to be met by: 19    Patient will increase functional independence with mobility by performin. Supine to sit with min A with use of HB features -MET 19  2. Sit to supine with min A with use of HB feature   3. Rolling R and L with min A with use of HB features-MET 19  4. Sitting at edge of bed >5 minutes with min A.   5. PT will assess sit<>stand. UPDATED 19: Patient will t/f sit <> stand with SBA using RW and no verbal cues for hand placement  6. PT will assess t/f bed to chair. UPDATED 19: Patient will t/f bed to chair via stand-step t/f with SBA using RW and no verbal cues for safety  7. PT will assess ambulation. UPDATED 19: Patient will ambulate > 150' in open environment with SBA using RW with no verbal cues for safety        Outcome: Ongoing (interventions implemented as appropriate)  Pt sit<> stand w/ RW and bed rail. Eduarda  amb'd 80 ft w/ RW and WC following for safety. SBA

## 2019-07-19 NOTE — PLAN OF CARE
07/19/19 1405   Final Note   Assessment Type Final Discharge Note   Anticipated Discharge Disposition Home-Health  ([facility] )   Hospital Follow Up  Appt(s) scheduled? Yes   Discharge plans and expectations educations in teach back method with documentation complete? Yes   Right Care Referral Info   Post Acute Recommendation No Care          Dc plan to go to pain management 3pm appt after dc today -      pt dc with [facility]  ( family hhc)     Dme: Shreyas and Bsmilind heavy duty x2      pt family will pick her up after her pain management appt     Margareth Suarez RN  Case management 7/19/20192:10 PM  # [phone] (FAX) [phone]

## 2019-07-19 NOTE — ASSESSMENT & PLAN NOTE
- patient with history of thyroid disease and follows Endocrinology at [facility]  - per patient report, she is scheduled for thyroidectomy on July 24, 2019 at [facility]   - awaiting call from Endocrinology regarding surgery details  - TSH and free T4 pending with morning labs

## 2019-07-19 NOTE — PLAN OF CARE
[facility]    HOME HEALTH ORDERS  FACE TO FACE ENCOUNTER    Patient Name: Ca Coats  YOB: 1950    PCP: Yenni Vail MD   PCP Address[place] [address] [place] [address]  PCP Phone Number: [phone]  PCP Fax: [phone]    Encounter Date: 07/19/2019    Admit to Home Health    Diagnoses:  Active Hospital Problems    Diagnosis  POA    *Primary osteoarthritis of left knee [M17.12]  Yes     Priority: 1 - High    At high risk for injury related to fall [Z91.81]  Yes     Priority: 2     Gait instability [R26.81]  Yes     Priority: 2     Morbid obesity [E66.01]  Yes     Priority: 3     Obstructive sleep apnea [G47.33]  Yes     Priority: 4     Essential hypertension [I10]  Yes     Priority: 5     Hyperlipidemia [E78.5]  Yes     Priority: 6     Thyroid disease [E07.9]  Yes    Debility [R53.81]  Yes      Resolved Hospital Problems   No resolved problems to display.       Future Appointments   Date Time Provider Department Center   8/8/2019  8:00 AM Samantha Vidal MD [facility] PAINMGT [facility]   8/12/2019 11:00 AM Shai Jacobo NP [facility] SLEEP [facility]   8/12/2019 11:20 AM Yenni Vail MD [facility] IM [facility]           I have seen and examined this patient face to face today. My clinical findings that support the need for the home health skilled services and home bound status are the following:  Weakness/numbness causing balance and gait disturbance due to Weakness/Debility making it taxing to leave home.  Requiring assistive device to leave home due to unsteady gait caused by  Weakness/Debility.    Allergies:Review of patient's allergies indicates:  No Known Allergies    Diet: diabetic diet: 2000 calorie    Activities: activity as tolerated and ambulate in house with assistance    Nursing:   SN to complete comprehensive assessment including routine vital signs. Instruct on disease process and s/s of complications to report to  MD. Review/verify medication list sent home with the patient at time of discharge  and instruct patient/caregiver as needed. Frequency may be adjusted depending on start of care date.    Notify MD if SBP > 160 or < 90; DBP > 90 or < 50; HR > 120 or < 50; Temp > 101     CONSULTS:    Physical Therapy to evaluate and treat. Evaluate for home safety and equipment needs; Establish/upgrade home exercise program. Perform / instruct on therapeutic exercises, gait training, transfer training, and Range of Motion.  Occupational Therapy to evaluate and treat. Evaluate home environment for safety and equipment needs. Perform/Instruct on transfers, ADL training, ROM, and therapeutic exercises.   to evaluate for community resources/long-range planning.  Aide to provide assistance with personal care, ADLs, and vital signs.    MISCELLANEOUS CARE:  Routine Skin for Bedridden Patients: Instruct patient/caregiver to apply moisture barrier cream to all skin folds and wet areas in perineal area daily and after baths and all bowel movements.       Medications: Review discharge medications with patient and family and provide education.      Current Discharge Medication List      CONTINUE these medications which have NOT CHANGED    Details   amLODIPine (NORVASC) 5 MG tablet Take 5 mg by mouth once daily.       atorvastatin (LIPITOR) 40 MG tablet Take 40 mg by mouth once daily.       B-complex with vitamin C (Z-BEC OR EQUIV) tablet Take 1 tablet by mouth once daily.       CALCIUM CITRATE-VITAMIN D2 ORAL Take 1 tablet by mouth once daily.       ferrous sulfate 220 mg (44 mg iron)/5 mL solution Take 220 mg by mouth once daily.      gabapentin (NEURONTIN) 300 MG capsule Take 300 mg by mouth 2 (two) times daily.       lisinopril (PRINIVIL,ZESTRIL) 40 MG tablet Take 40 mg by mouth once daily.       multivitamin with minerals tablet Take 1 tablet by mouth once daily.       omega 3-dha-epa-fish oil 1,000 mg (120 mg-180 mg) Cap        oxyCODONE-acetaminophen (PERCOCET) 5-325 mg per tablet Take 1 tablet by mouth every 6 (six) hours as needed for Pain.  Qty: 20 tablet, Refills: 0      pantoprazole (PROTONIX) 40 MG tablet Take 40 mg by mouth 2 (two) times daily.       sucralfate (CARAFATE) 1 gram tablet Take 1 g by mouth 4 (four) times daily before meals and nightly.       acetaminophen (TYLENOL) 500 MG tablet          STOP taking these medications       VITAMIN B-12 5,000 mcg Subl Comments:   Reason for Stopping:               I certify that this patient is confined to her home and needs intermittent skilled nursing care, physical therapy and occupational therapy and Social Work for long term planning and home safety evaluation.

## 2019-07-19 NOTE — PT/OT/SLP PROGRESS
Physical Therapy Treatment    Patient Name:  Ca Coats   MRN:  [id number]    Recommendations:     Discharge Recommendations:  nursing facility, skilled   Discharge Equipment Recommendations: commode, other (see comments)   Barriers to discharge: Decreased caregiver support    Assessment:     Ca Coats is a 68 y.o. female admitted with a medical diagnosis of Primary osteoarthritis of left knee.  She presents with the following impairments/functional limitations:  weakness, impaired endurance, impaired self care skills, impaired functional mobilty, gait instability, impaired balance, pain, decreased ROM, decreased lower extremity function ,  Pt upset about being D/C because lack of help at home.   pt had good mobility . Pt amb'd w/ RW SBA WC following for safety.    Rehab Prognosis: Good; patient would benefit from acute skilled PT services to address these deficits and reach maximum level of function.    Recent Surgery: Procedure(s) (LRB):  BLOCK, NERVE, GENICULAR WITH PHENOL 6 % (Left)      Plan:     During this hospitalization, patient to be seen 5 x/week to address the identified rehab impairments via gait training, therapeutic activities, therapeutic exercises, neuromuscular re-education and progress toward the following goals:    · Plan of Care Expires:  08/15/19    Subjective     Chief Complaint: going home alone  Patient/Family Comments/goals: not to come back to hospital.  Pain/Comfort:  · Pain Rating 1: 6/10  · Location - Orientation 1: generalized  · Location 1: knee  · Pain Addressed 1: Distraction, Cessation of Activity, Nurse notified  · Pain Rating Post-Intervention 1: 6/10      Objective:     Communicated with ns prior to session.  Patient found HOB elevated with peripheral IV upon PT entry to room.     General Precautions: Standard, fall, other (see comments)   Orthopedic Precautions:N/A   Braces: N/A     Functional Mobility:  · Transfers:     · Sit to Stand:  stand by  assistance with rolling walker  · Gait: 80 ft w/ Rw Wc following for safety. @ min rest break after 40 ft      AM-PAC 6 CLICK MOBILITY  Turning over in bed (including adjusting bedclothes, sheets and blankets)?: 3  Sitting down on and standing up from a chair with arms (e.g., wheelchair, bedside commode, etc.): 3  Moving from lying on back to sitting on the side of the bed?: 3  Moving to and from a bed to a chair (including a wheelchair)?: 3  Need to walk in hospital room?: 3  Climbing 3-5 steps with a railing?: 2  Basic Mobility Total Score: 17       Therapeutic Activities and Exercises:   pt performed LE ex. AP,LAQ, mini hip flexion in WC X 10    Patient left sitting in BWC with all lines intact, call button in reach and ns notified..    GOALS:   Multidisciplinary Problems     Physical Therapy Goals        Problem: Physical Therapy Goal    Goal Priority Disciplines Outcome Goal Variances Interventions   Physical Therapy Goal     PT, PT/OT Ongoing (interventions implemented as appropriate)     Description:  Goals to be met by: 19    Patient will increase functional independence with mobility by performin. Supine to sit with min A with use of HB features -MET 19  2. Sit to supine with min A with use of HB feature   3. Rolling R and L with min A with use of HB features-MET 19  4. Sitting at edge of bed >5 minutes with min A.   5. PT will assess sit<>stand. UPDATED 19: Patient will t/f sit <> stand with SBA using RW and no verbal cues for hand placement  6. PT will assess t/f bed to chair. UPDATED 19: Patient will t/f bed to chair via stand-step t/f with SBA using RW and no verbal cues for safety  7. PT will assess ambulation. UPDATED 19: Patient will ambulate > 150' in open environment with SBA using RW with no verbal cues for safety                         Time Tracking:     PT Received On: 19  PT Start Time: 940     PT Stop Time: 1010  PT Total Time (min): 30 min      Billable Minutes: Gait Training 18 and Therapeutic Exercise 12    Treatment Type: Treatment  PT/PTA: PTA     PTA Visit Number: 2     Barrett Jorgensen, PTA  07/19/2019

## 2019-07-19 NOTE — SUBJECTIVE & OBJECTIVE
Interval History: continues with left knee pain and refusing pain medications at time; working well with PT/OT and agree with skilled nursing placement; medical records requested from [facility] Endocrinology re: thyroidectomy scheduled 7/24/2019    Review of Systems   Constitutional: Positive for activity change and fatigue. Negative for chills and fever.   HENT: Negative for congestion and trouble swallowing.    Eyes: Negative for photophobia and visual disturbance.   Respiratory: Negative for cough and shortness of breath.    Cardiovascular: Negative for chest pain, palpitations and leg swelling.   Gastrointestinal: Negative for abdominal pain, diarrhea, nausea and vomiting.   Endocrine: Negative.    Genitourinary: Negative for dysuria, frequency and urgency.   Musculoskeletal: Positive for arthralgias and gait problem (multiple falls due to left knee weakness). Negative for joint swelling.   Skin: Negative for color change and wound.   Neurological: Negative for syncope, weakness, numbness and headaches.   Hematological: Does not bruise/bleed easily.   Psychiatric/Behavioral: Positive for dysphoric mood. The patient is nervous/anxious.      Objective:     Vital Signs (Most Recent):  Temp: 98.6 °F (37 °C) (07/18/19 2018)  Pulse: 76 (07/18/19 2018)  Resp: 18 (07/18/19 2018)  BP: 134/68 (07/18/19 2018)  SpO2: 96 % (07/18/19 2018) Vital Signs (24h Range):  Temp:  [97.9 °F (36.6 °C)-99.1 °F (37.3 °C)] 98.6 °F (37 °C)  Pulse:  [70-90] 76  Resp:  [16-20] 18  SpO2:  [94 %-96 %] 96 %  BP: (122-145)/(62-77) 134/68     Weight: (!) 170.4 kg (375 lb 10.6 oz)  Body mass index is 60.63 kg/m².    Intake/Output Summary (Last 24 hours) at 7/18/2019 2203  Last data filed at 7/18/2019 1702  Gross per 24 hour   Intake 480 ml   Output 900 ml   Net -420 ml      Physical Exam   Constitutional: She is oriented to person, place, and time. She appears well-developed and well-nourished. No distress.   Morbidly obese female   HENT:   Head:  Normocephalic and atraumatic.   Mouth/Throat: Oropharynx is clear and moist.   Eyes: Pupils are equal, round, and reactive to light. Conjunctivae and EOM are normal. No scleral icterus.   Neck: Normal range of motion. Neck supple. No JVD present.   Cardiovascular: Normal rate, regular rhythm, normal heart sounds and intact distal pulses.   Pulmonary/Chest: Effort normal and breath sounds normal. No respiratory distress.   Abdominal: Soft. Bowel sounds are normal. There is no tenderness.   obese   Musculoskeletal: She exhibits no edema or deformity.        Left knee: She exhibits swelling (mild). She exhibits no ecchymosis, no deformity and no erythema. Tenderness found. No patellar tendon tenderness noted.   Neurological: She is alert and oriented to person, place, and time.   Skin: Skin is warm and dry. Capillary refill takes less than 2 seconds. No erythema.   Psychiatric: Her speech is normal and behavior is normal. Judgment and thought content normal. Her mood appears anxious.   Tearful during exam; concerned she cannot take care of herself   Nursing note and vitals reviewed.      Significant Labs:   All pertinent labs within the past 24 hours have been reviewed.    Significant Imaging: I have reviewed all pertinent imaging results/findings within the past 24 hours.

## 2019-07-19 NOTE — PLAN OF CARE
Problem: Occupational Therapy Goal  Goal: Occupational Therapy Goal  Goals to be met by: 7/30/19     Patient will increase functional independence with ADLs by performing:    LE Dressing with Minimal Assistance and Assistive Devices as needed.  Grooming while standing at sink with Contact Guard Assistance.  Toileting from bedside commode with Moderate Assistance for hygiene and clothing management.  GOAL MET 7/17/19.  Bathing from  edge of bed with Minimal Assistance. (MET for seated shower 07/19/19)  Toilet transfer to bedside commode with Stand-by Assistance.  GOAL MET 7/17/19.      Outcome: Ongoing (interventions implemented as appropriate)  Slowly progressing toward goals.  Needs extensive assist with LBD and toileting from [facility].  Endurance limited, seated rest breaks utilized as needed.  Continue OT services to restore patient functioning.    Comments: MARIANNA Escobar, 7/19/2019

## 2019-07-19 NOTE — PLAN OF CARE
Problem: Adult Inpatient Plan of Care  Goal: Plan of Care Review  Outcome: Ongoing (interventions implemented as appropriate)     07/19/19 0700   Plan of Care Review   Plan of Care Reviewed With patient   Progress improving   Pt rested peacefully throughout the night on CPAP machine. Pt pain managed by PRN meds. Pt up with walker and stand by assist. Instructed to call for assistance. Bed in lowest position. Fall precautions in place. POC reviewed. Pt remained free of falls or injuries this shift. Will continue to monitor.

## 2019-07-19 NOTE — PT/OT/SLP PROGRESS
Occupational Therapy   Treatment    Name: Ca Coats  MRN: [id number]  Admitting Diagnosis:  Primary osteoarthritis of left knee       Recommendations:     Discharge Recommendations: nursing facility, skilled  Discharge Equipment Recommendations:  other (see comments), tub bench, commode, walker, rolling(all bariatric)  Barriers to discharge:  Decreased caregiver support    Assessment:     Ca Coats is a 68 y.o. female with a medical diagnosis of Primary osteoarthritis of left knee.  She presents with the following performance deficits affecting function: weakness, impaired endurance, impaired self care skills, impaired functional mobilty, gait instability, impaired balance, decreased lower extremity function.     Slowly progressing toward goals.  Needs extensive assist with LBD and toileting from [facility].  Endurance limited, seated rest breaks utilized as needed. Continue OT services to restore patient functioning.  Good SNF candidate.    Prior to admission pt was modified independent with mobility and self-care and there is expectation of returning to prior level of function to maintain independence avoiding readmission. Pt is at high risk of unplanned readmission due to fall risk and lack of 24 hour caregiver in prior setting.      Rehab Prognosis:  Fair +; patient would benefit from acute skilled OT services to address these deficits and reach maximum level of function.       Plan:     Patient to be seen 5 x/week to address the above listed problems via self-care/home management, therapeutic activities, therapeutic exercises  · Plan of Care Expires: 07/30/19  · Plan of Care Reviewed with: patient    Subjective     Pain/Comfort:  · Pain Rating 1: 0/10  · Pain Rating Post-Intervention 1: 0/10    Objective:     Communicated with: RN prior to session.  Patient found seated on BSC with peripheral IV upon OT entry to room.    General Precautions: Standard, anti-coagulation medicine, fall(cardiac  diet)   Orthopedic Precautions:N/A   Braces: N/A     Occupational Performance:     Bed Mobility:    · NT     Functional Mobility/Transfers:  · Patient completed Sit <> Stand Transfer with stand by assistance  with  rolling walker from 3-in-1  · Patient completed Shower <> Chair Transfer using Step Transfer technique with contact guard assistance with rolling walker  · Patient completed  Shower Transfer Step Transfer technique with contact guard assistance with rolling walker and grab bars  · Functional Mobility: NT    Activities of Daily Living:  · Bathing: minimum assistance for feet and buttock jailene care  · Upper Body Dressing: stand by assistance bra, hospital gown seated at EOB  · Lower Body Dressing: total assistance doffing/donning non skid socks  · Toileting: moderate assistance (Total assist for buttock jailene care) from 3-in-1      [facility] 6 Click ADL: 17    Treatment & Education:  Educated on safety with step transfer in/out of shower.  Verbalized and demonstrated understanding.    Patient left seated at EOB with all lines intact, call button in reach and RN notifiedEducation:      GOALS:   Multidisciplinary Problems     Occupational Therapy Goals        Problem: Occupational Therapy Goal    Goal Priority Disciplines Outcome Interventions   Occupational Therapy Goal     OT, PT/OT Ongoing (interventions implemented as appropriate)    Description:  Goals to be met by: 7/30/19     Patient will increase functional independence with ADLs by performing:    LE Dressing with Minimal Assistance and Assistive Devices as needed.  Grooming while standing at sink with Contact Guard Assistance.  Toileting from bedside commode with Moderate Assistance for hygiene and clothing management.  GOAL MET 7/17/19.  Bathing from  edge of bed with Minimal Assistance. (MET for seated shower 07/19/19)  Toilet transfer to bedside commode with Stand-by Assistance.  GOAL MET 7/17/19.                        Time Tracking:     OT Date of  Treatment: 07/19/19  OT Start Time: 1023  OT Stop Time: 1056  OT Total Time (min): 33 min    Billable Minutes:Self Care/Home Management 33    MARIANNA Escobar  7/19/2019

## 2019-07-20 PROCEDURE — G0180 MD CERTIFICATION HHA PATIENT: HCPCS | Mod: ,,, | Performed by: HOSPITALIST

## 2019-07-20 PROCEDURE — G0180 PR HOME HEALTH MD CERTIFICATION: ICD-10-PCS | Mod: ,,, | Performed by: HOSPITALIST

## 2019-07-24 NOTE — DISCHARGE SUMMARY
"[facility] Hospital Medicine  Discharge Summary      Patient Name: Ca Coats  MRN: [id number]  Admission Date: 7/15/2019  Hospital Length of Stay: 0 days  Discharge Date and Time: 7/19/2019  2:17 PM  Attending Physician: Yin Cedillo MD   Discharging Provider: Marci Leggett NP  Primary Care Provider: Yenni Vail MD      HPI:   Per VADIM Gannon PA:  69 y/o female with Hx of HTN, morbid obesity, LAURA on CPAP, Depression, Hypothyroidism, chronic knee pain followed by Pain management presents to ED with c/o fall today. States she has chronic left knee pain, and felt her knee "give out".  Patient states she has been having multiple falls due to her knee. Reports she has home health but no one is at home with her. Reports that she is afraid she is going to fall and hurt herself. Reports she calls 911 whenever she falls. She doesn't fell safe going home. Denies LOC, fever, chills, CP, SOB, N/V/D, dysuria, numbness.  Per ED paramedics stated that her house is extremely small difficult to get around. Admitted to OBS for PT/OT, placement.        Procedure(s) (LRB):  BLOCK, NERVE, GENICULAR WITH PHENOL 6 % (Left)  Outpatient Procedure with Pain Managment    Hospital Course:   Ca Coats was admitted under observation for worsening left knee pain and report of fall.  On admit, x-ray left knee without evidence for acute fracture or dislocation and no changes from prior imaging.  The patient reportedly has failed conservative treatments with physical therapy, steroid injections, and Synvisc injection. Per medical record review she has plans to have total knee arthroplasty that the patient reports is dependent on weight loss.  Per the patient, she is currently enrolled at [facility] Bariatric program, but has missed appointments   She has reported recent gait instability related to worsening of pain and has history of falling related to the instability of her left knee. " I discussed the case with Pain Management where she is scheduled for left genicular nerve block on July 19.    She had no medical complaints other than knee pain that is chronic.  Labs were within normal limit and vital signs were stable.  She reported that she was scheduled for thyroidectomy at [facility] on July 24, 2019.  Given the patient's reported need for thyroidectomy and that it is scheduled, I also have recommended she go to the planned surgical procedure on Wednesday, July 24th at [facility].  To address her chronic knee pain,  I have recommended the patient proceed to left genicular nerve block injection with pain management at discharge and  resume home health PT/OT services.   I have discussed with the patient and her family that she should resume treatment with her bariatric program once she has recovered from her thyroidectomy so she can move forward with planned total knee arthroplasty.  She was discharged to Pain Management for left knee nerve block.      Consults:   Consults (From admission, onward)        Status Ordering Provider     Inpatient consult to Registered Dietitian/Nutritionist  Once     Provider:  (Not yet assigned)    MARIAA Cullen          Service: Hospital Medicine    Final Active Diagnoses:    Diagnosis Date Noted POA    PRINCIPAL PROBLEM:  Primary osteoarthritis of left knee [M17.12] 07/10/2018 Yes    At high risk for injury related to fall [Z91.81]  Yes    Gait instability [R26.81] 07/15/2019 Yes    Morbid obesity [E66.01] 04/29/2019 Yes    Obstructive sleep apnea [G47.33] 08/09/2018 Yes    Essential hypertension [I10] 04/29/2019 Yes    Hyperlipidemia [E78.5] 04/29/2019 Yes    Thyroid disease [E07.9] 07/18/2019 Yes    Debility [R53.81]  Yes      Problems Resolved During this Admission:       Discharged Condition: good    Disposition: Home or Self Care    Follow Up:  Follow-up Information     Yenni Vail MD. Go on 8/12/2019.   "  Specialty:  Family Medicine  Why:  Primary Care to establish care and for follow up after hospital discharge  Contact information:  [address]  [facility] [address]  [phone]             [facility]           [facility] [place].    Specialties:  Home Health Services, Physical Therapy, Occupational Therapy  Contact information:  [address]  [address]  [facility] [address]  [phone]             [facility]    Specialty:  DME Provider  Why:  DME  heavy duty rollator , bedside commode   Contact information:  [address]  [address]  [facility] [address]  [phone]                 Patient Instructions:      WALKER FOR HOME USE   Order Comments: rollator heavy duty     Order Specific Question Answer Comments   Type of Walker: Heavy duty    With wheels? Yes    Height: 5' 6" (1.676 m)    Weight: 170.4 kg (375 lb 10.6 oz)    Length of need (1-99 months): 99    Does patient have medical equipment at home? shower chair    Does patient have medical equipment at home? walker, standard    Does patient have medical equipment at home? wheelchair    Does patient have medical equipment at home? cane, straight    Please check all that apply: Patient's condition impairs ambulation.    Please check all that apply: Patient is unable to safely ambulate without equipment.    Please check all that apply: Patient needs help to get in and out of chair.    Please check all that apply: Walker will be used for gait training.      COMMODE FOR HOME USE     Order Specific Question Answer Comments   Type: Heavy duty    Height: 5' 6" (1.676 m)    Weight: 170.4 kg (375 lb 10.6 oz)    Does patient have medical equipment at home? shower chair    Does patient have medical equipment at home? walker, standard    Does patient have medical equipment at home? wheelchair    Does patient have medical equipment at home? cane, straight    Length of need (1-99 months): 99      Diet diabetic     Notify your health care " provider if you experience any of the following:  temperature >100.4     Notify your health care provider if you experience any of the following:  severe uncontrolled pain     Activity as tolerated       Significant Diagnostic Studies: Labs: All labs within the past 24 hours have been reviewed       Medications:  Reconciled Home Medications:      Medication List      CONTINUE taking these medications    acetaminophen 500 MG tablet  Commonly known as:  TYLENOL     amLODIPine 5 MG tablet  Commonly known as:  NORVASC  Take 5 mg by mouth once daily.     atorvastatin 40 MG tablet  Commonly known as:  LIPITOR  Take 40 mg by mouth once daily.     B-complex with vitamin C tablet  Commonly known as:  Z-Bec or Equiv  Take 1 tablet by mouth once daily.     CALCIUM CITRATE-VITAMIN D2 ORAL  Take 1 tablet by mouth once daily.     ferrous sulfate 220 mg (44 mg iron)/5 mL solution  Take 220 mg by mouth once daily.     gabapentin 300 MG capsule  Commonly known as:  NEURONTIN  Take 300 mg by mouth 2 (two) times daily.     lisinopril 40 MG tablet  Commonly known as:  PRINIVIL,ZESTRIL  Take 40 mg by mouth once daily.     multivitamin with minerals tablet  Take 1 tablet by mouth once daily.     omega 3-dha-epa-fish oil 1,000 mg (120 mg-180 mg) Cap     oxyCODONE-acetaminophen 5-325 mg per tablet  Commonly known as:  PERCOCET  Take 1 tablet by mouth every 6 (six) hours as needed for Pain.     pantoprazole 40 MG tablet  Commonly known as:  PROTONIX  Take 40 mg by mouth 2 (two) times daily.     sucralfate 1 gram tablet  Commonly known as:  CARAFATE  Take 1 g by mouth 4 (four) times daily before meals and nightly.        STOP taking these medications    VITAMIN B-12 5,000 mcg Subl  Generic drug:  cyanocobalamin (vitamin B-12)              Time spent on the discharge of patient: >30 minutes  Patient was seen and examined on the date of discharge and determined to be suitable for discharge.         Marci Leggett NP  Department of [facility]  Medicine  [facility]

## 2019-07-24 NOTE — HOSPITAL COURSE
Ca Coats was admitted under observation for worsening left knee pain and report of fall.  On admit, x-ray left knee without evidence for acute fracture or dislocation and no changes from prior imaging.  The patient reportedly has failed conservative treatments with physical therapy, steroid injections, and Synvisc injection. Per medical record review she has plans to have total knee arthroplasty that the patient reports is dependent on weight loss.  Per the patient, she is currently enrolled at [facility] Bariatric program, but has missed appointments   She has reported recent gait instability related to worsening of pain and has history of falling related to the instability of her left knee. I discussed the case with Pain Management where she is scheduled for left genicular nerve block on July 19.    She had no medical complaints other than knee pain that is chronic.  Labs were within normal limit and vital signs were stable.  She reported that she was scheduled for thyroidectomy at [facility] on July 24, 2019.  Given the patient's reported need for thyroidectomy and that it is scheduled, I also have recommended she go to the planned surgical procedure on Wednesday, July 24th at [facility].  To address her chronic knee pain,  I have recommended the patient proceed to left genicular nerve block injection with pain management at discharge and  resume home health PT/OT services.   I have discussed with the patient and her family that she should resume treatment with her bariatric program once she has recovered from her thyroidectomy so she can move forward with planned total knee arthroplasty.  She was discharged to Pain Management for left knee nerve block.

## 2019-07-29 ENCOUNTER — TELEPHONE (OUTPATIENT)
Dept: PAIN MEDICINE | Facility: CLINIC | Age: 69
End: 2019-07-29

## 2019-07-29 ENCOUNTER — PATIENT OUTREACH (OUTPATIENT)
Dept: ADMINISTRATIVE | Facility: HOSPITAL | Age: 69
End: 2019-07-29

## 2019-07-29 DIAGNOSIS — Z12.11 SCREENING FOR COLORECTAL CANCER: ICD-10-CM

## 2019-07-29 DIAGNOSIS — Z12.12 SCREENING FOR COLORECTAL CANCER: ICD-10-CM

## 2019-07-29 DIAGNOSIS — Z12.39 SCREENING FOR BREAST CANCER: ICD-10-CM

## 2019-07-29 DIAGNOSIS — M89.9 DISORDER OF BONE AND CARTILAGE: ICD-10-CM

## 2019-07-29 DIAGNOSIS — Z11.59 NEED FOR HEPATITIS C SCREENING TEST: Primary | ICD-10-CM

## 2019-07-29 DIAGNOSIS — M94.9 DISORDER OF BONE AND CARTILAGE: ICD-10-CM

## 2019-07-29 NOTE — TELEPHONE ENCOUNTER
staff called and spoke with pt regarding her stating she has been trembling after procedure    Pt was offered to come in sooner    Pt declined and sated she will have to reschedule once she knows she has a ride

## 2019-07-29 NOTE — TELEPHONE ENCOUNTER
----- Message from Dorcas Estrada sent at 7/29/2019  3:19 PM CDT -----  Contact: Self   Name of Who is Calling: LU RENAE [[id number]]      What is the request in detail: pt says she is still trembling after her procedure. Please contact to further discuss and advise.      Can the clinic reply by [facility]: n      What Number to [facility] Back [facility]: [phone]

## 2019-08-05 ENCOUNTER — TELEPHONE (OUTPATIENT)
Dept: BARIATRICS | Facility: CLINIC | Age: 69
End: 2019-08-05

## 2019-08-05 NOTE — TELEPHONE ENCOUNTER
Bariatric nutrition. Called to schedule f/u appt to check in on progress with diet plan and pre-op wt loss. Left message to return call.    It appears she has lost about 20 lbs since her last visit 2 months ago. She is now under 360 lbs as Dr. Cleveland wanted.

## 2019-08-05 NOTE — TELEPHONE ENCOUNTER
----- Message from Aminta Donald sent at 7/19/2019 10:23 AM CDT -----  Regarding: call to schedule appt  Schedule appt to lose pre op wt

## 2019-08-07 ENCOUNTER — TELEPHONE (OUTPATIENT)
Dept: BARIATRICS | Facility: CLINIC | Age: 69
End: 2019-08-07

## 2019-08-07 NOTE — TELEPHONE ENCOUNTER
----- Message from Aminta Donald sent at 8/5/2019  2:15 PM CDT -----  Regarding: wt loss before sx  It looks like her wt is under 360 lbs after her inpatient stay. Carrie saw her beginning of June and the diet was on track. I tried calling to discuss her current diet but had to leave a msg. She can probably be cleared once I reach her.

## 2019-08-08 ENCOUNTER — OFFICE VISIT (OUTPATIENT)
Dept: PAIN MEDICINE | Facility: CLINIC | Age: 69
End: 2019-08-08
Attending: ANESTHESIOLOGY
Payer: MEDICARE

## 2019-08-08 VITALS
RESPIRATION RATE: 18 BRPM | HEART RATE: 75 BPM | WEIGHT: 293 LBS | BODY MASS INDEX: 47.09 KG/M2 | TEMPERATURE: 98 F | SYSTOLIC BLOOD PRESSURE: 123 MMHG | DIASTOLIC BLOOD PRESSURE: 73 MMHG | HEIGHT: 66 IN

## 2019-08-08 DIAGNOSIS — M17.12 PRIMARY OSTEOARTHRITIS OF LEFT KNEE: ICD-10-CM

## 2019-08-08 DIAGNOSIS — G89.29 CHRONIC PAIN OF LEFT KNEE: ICD-10-CM

## 2019-08-08 DIAGNOSIS — E66.01 MORBID OBESITY: ICD-10-CM

## 2019-08-08 DIAGNOSIS — G89.4 CHRONIC PAIN DISORDER: Primary | ICD-10-CM

## 2019-08-08 DIAGNOSIS — M25.562 CHRONIC PAIN OF LEFT KNEE: ICD-10-CM

## 2019-08-08 PROCEDURE — 99214 PR OFFICE/OUTPT VISIT, EST, LEVL IV, 30-39 MIN: ICD-10-PCS | Mod: S$PBB,,, | Performed by: ANESTHESIOLOGY

## 2019-08-08 PROCEDURE — 99213 OFFICE O/P EST LOW 20 MIN: CPT | Mod: PBBFAC | Performed by: ANESTHESIOLOGY

## 2019-08-08 PROCEDURE — 99999 PR PBB SHADOW E&M-EST. PATIENT-LVL III: ICD-10-PCS | Mod: PBBFAC,,, | Performed by: ANESTHESIOLOGY

## 2019-08-08 PROCEDURE — 99999 PR PBB SHADOW E&M-EST. PATIENT-LVL III: CPT | Mod: PBBFAC,,, | Performed by: ANESTHESIOLOGY

## 2019-08-08 PROCEDURE — 99214 OFFICE O/P EST MOD 30 MIN: CPT | Mod: S$PBB,,, | Performed by: ANESTHESIOLOGY

## 2019-08-08 RX ORDER — OXYCODONE AND ACETAMINOPHEN 5; 325 MG/1; MG/1
1 TABLET ORAL 2 TIMES DAILY PRN
Qty: 60 TABLET | Refills: 0 | Status: SHIPPED | OUTPATIENT
Start: 2019-08-08 | End: 2019-09-04 | Stop reason: SDUPTHER

## 2019-08-08 NOTE — PATIENT INSTRUCTIONS
Recommend increasing gabapentin to previous dose of:    300 mg in the morning and 600 mg at bedtime.    We are starting Percocet 5/325 mg twice daily as needed for your pain.  I will see you back in 4 weeks to see how it is doing.

## 2019-08-08 NOTE — PROGRESS NOTES
"Subjective:     Patient ID: Ca Coats is a 68 y.o. female.    Chief Complaint: Pain    Consulted by: Self      Disclaimer: This note was generated using voice recognition software.  There may be a typographical errors that were missed during proofreading.      HPI:    Ca Coats is a 68 y.o. female who presents today with left knee pain. Her pain has been going on for "too long."  She was previously treated by Dr. Iyer at [facility].  She has injections [id number] with him.  These were helpful, but she does not know if the injections were steroid or viscosupplementation.   This pain is described in detail below.    Interval History (4/30/2018):  The patient returns to clinic today for follow up and records review. We did received records from [facility] including a MRI of her knee. Dr. Iyer's last office visit note from January 2017 does not include any previous injections. She continues to report bilateral knee pain, left greater than right. She describes this pain as constant and aching. This pain is worse with prolonged walking. She also report right shoulder pain with prolonged overhead activity. She continues to take Percocet with benefit. She denies any other health changes.    Interval History (5/31/2018):  The patient returns to clinic for follow up. She is s/p left knee Synvisc One injection on 5/8/2018. She reports 35% relief of her knee pain. She continues to report bilateral knee pain, left greater than right. She describes this pain as constant and aching. Her pain is worse with prolonged walking and standing. She is scheduled to see Orthopedics at the  End of this month. She is also following up with Bariatrics to discuss the lap band procedure. She continues to take Percocet with benefit. She also uses Voltaren gel with benefit but this is expensive for her. She denies any other health changes.         Interval History (7/2/2018):  The patient returns to clinic today for " "follow up. She continues to report left knee pain that is constant, sharp, and aching in nature. Her pain is worse with prolonged walking and standing. She is scheduled for weight loss surgery in August. She continues follow up with orthopedics who does not recommend surgery at this time due to her BMI. She continues to take Percocet as needed with benefit. She denies any other health changes.     Interval History (7/19/2018):  The patient returns to clinic today for follow up. She is s/p left genicular nerve block on 7/10/2018. She reports 80% relief of her left knee pain. She reports that she was able to walk for longer distances (3 blocks) after the block. Her pain has returned to baseline. She continues to report left knee pain that is constant, sharp, and aching in nature. Her pain is worse with prolonged walking and standing. She denies any other health changes.     Interval History (8/21/2018):  The patient returns to clinic today for follow up. She is s/p left genicular cooled RFA on 7/31/2018. She reports 60% relief of her left knee pain. She reports intermittent knee pain that is sore and aching in nature. She continues to perform a home exercise routine. She is actively trying to lose weight. She continues to follow up with Bariatric Surgery at [facility]. She is considering weight loss surgery. She continues to use compound cream with benefit. She denies any other health changes.     Interval History (11/21/2018):  The patient returns to clinic today for follow up. She reports increased left knee pain this past week. She reports 2 episodes where her knee has "locked" up on her while walking. She describes her knee pain as sore and aching. She is actively trying to lose weight and quit smoking. She continues to use the compound cream with benefit. She denies any other health changes.     Interval History (1/9/2019):  The patient returns to clinic today for follow up. She is s/p left knee steroid injection on " "12/21/2018. She reports one day of relief. She continues to report left knee pain that is constant, sharp, and aching in nature. Her pain is worse with standing and walking. She reports that her knee "locks" up on her while walking. She often feels as though she is going to fall. She is scheduled for bariatric weight loss surgery in February at [facility]. She denies any other health changes.     Interval History (2/28/2019):  The patient returns to clinic today for follow up. She is s/p Euflexxa series completed on 1/30/2019. She reports that she able to stand for longer periods of time since the procedure. She continues to report left knee pain that is sore and aching in nature. Her pain is worse with prolonged standing and walking. She was previously scheduled for weight loss surgery but reports this was canceled. She is scheduled for follow up next week about this. She denies any other health changes.     Interval History (4/12/2019):  The patient returns for f/u of left knee pain.  She is s/p left genicular cooled RFA with about 90% pain relief.  She has been able to walk easier.  She also notices less stiffness in her knee.  She is planning on gastric surgery prior to knee replacement.  She had benefit with compounding cream but it is no longer covered by her insurance.  She does take Percocet from her PCP.  Her pain today is 7/10.    Interval History (7/11/19):  Patient reported to ED today for increased L knee pain and an episode of LLE weakness that lead to a near fall. Patient is s/p L genicular RFA in 3/19/19 that provided 90% relief for 2 months then came back. Pain is a crunchy pain, in left knee, that does not radiate, worse with movement, better with rest. Pain today is 9/10. She normally uses cane to ambulate but is currently using wheelchair at hospital. Denies any trauma ot the area, fever/chills, n/v, abdominal pain, or HA.    Interval History (8/8/2019):  She returns today for follow up s/p left knee " genicular chemodenervation with phenol 7/19/19.  She reports that this procedure did not provide any improvement in her left knee symptoms, and she is still having significant difficulty with ambulation, still uses wheelchair for mobility. She also reports left lateral thigh and hip pain lateral upper thigh numbness. She has been admitted to [facility] but is not receiving PT there. She is still considering lap band surgery but has missed a followup appointment.     Physical Therapy: Yes, she did this in 2018 for one month (twice a week). She does HEP (stretching and ROM in the morning)    Non-pharmacologic Treatment:     · Ice/Heat: Heat is more helpful than ice  · TENS: Not tried  · Massage: Not tried  · Chiropractic care: Not tried  · Acupuncture: Not tried  · Other: Uses a cane         Pain Medications:         · Currently taking: Gabapentin 300 mg/600 mg, tylenol 500mg Q6 prn    · Has tried in the past:  Ibuprofen (still takes sometimes), Voltaren gel    · Has not tried: Muscle relaxants, TCAs, SNRIs, Lyrica, compound topical creams    Blood thinners: None    Interventional Therapies: A0Dtsgu injections by Dr. Iyer were helpful  · 5/8/2018- Left knee Synvisc One injection  · 7/10/2018- Left genicular nerve block  · 7/31/2018- Left genicular cooled RFA  · 12/21/2018- Left knee steroid injection  · 1/31/2019- Left knee Euflexxa series  · 3/19/2019 Left genicular cooled RFA- 90% relief  · 7/19/19- Left knee genicular phenol chemodenervation-no relief    Relevant Surgeries: None    Affecting sleep? No    Affecting daily activities? Yes    Depressive symptoms? No          · SI/HI? No    Work status: No, on disability due to her knee    Prescription Monitoring Program database:  Reviewed and consistent with medication use as prescribed.      Last 3 PDI Scores 8/8/2019 7/11/2019 4/12/2019   Pain Disability Index (PDI) 70 [phone]   ]    GENERAL: +chills No weight loss, malaise or fevers.  HEENT:   No  recent changes in vision or hearing  NECK:  Negative for lumps, no difficulty with swallowing.  RESPIRATORY:  Negative for cough, wheezing or shortness of breath, patient denies any recent URI.  CARDIOVASCULAR:  Negative for chest pain, leg swelling or palpitations.  GI:  Negative for abdominal discomfort, blood in stools or black stools or change in bowel habits.  MUSCULOSKELETAL:  See HPI.  SKIN:  Negative for lesions, rash, and itching.  PSYCH:  Negative for mood disorder or recent psychosocial stressors.    HEMATOLOGY/LYMPHOLOGY:  Negative for prolonged bleeding, bruising easily or swollen nodes.    ENDO: No history of diabetes or thyroid dysfunction  NEURO:   No history of headaches, syncope, paralysis, seizures or tremors.  All other reviewed and negative other than HPI.          Past Medical History:   Diagnosis Date    Anemia     2018    Arthritis     BMI 60.0-69.9, adult     Cataract     Depression     Dry eye syndrome     GERD (gastroesophageal reflux disease)     Glaucoma suspect     History of gastric ulcer     Hyperlipidemia     Hypertension     Hypothyroidism     Morbid obesity     Neuromuscular disorder     Thyroid disease        Past Surgical History:   Procedure Laterality Date    [id number] Left 7/31/2018    [facility]    [id number], LEFT Left 3/19/2019    Performed by Priya Thacker [facility] at [facility]    APPENDECTOMY      BLOCK, NERVE Left 7/10/2018    [facility]    Block, Nerve NERVE BLOCK GENICULAR WITH PHENOL 6% Left 7/19/2019    [facility] by [facility] at [facility]    CATARACT EXTRACTION W/  INTRAOCULAR LENS IMPLANT Bilateral     MFIOL OU    cataract surgery   2017    Injection LEFT KNEE STEROID INJECTION Left 12/21/2018    [facility] by [facility] at [facility]    INJECTION-JOINT Left 5/8/2018    Performed by Samantha FIERRO  MD [facility]       Review of patient's allergies indicates:  No Known Allergies    Current Outpatient Medications   Medication Sig Dispense Refill    amLODIPine (NORVASC) 5 MG tablet Take 5 mg by mouth once daily.       atorvastatin (LIPITOR) 40 MG tablet Take 40 mg by mouth once daily.       B-complex with vitamin C (Z-BEC OR EQUIV) tablet Take 1 tablet by mouth once daily.       CALCIUM CITRATE-VITAMIN D2 ORAL Take 1 tablet by mouth once daily.       ferrous sulfate 220 mg (44 mg iron)/5 mL solution Take 220 mg by mouth once daily.      gabapentin (NEURONTIN) 300 MG capsule Take 300 mg by mouth 2 (two) times daily.       lisinopril (PRINIVIL,ZESTRIL) 40 MG tablet Take 40 mg by mouth once daily.       multivitamin with minerals tablet Take 1 tablet by mouth once daily.       pantoprazole (PROTONIX) 40 MG tablet Take 40 mg by mouth 2 (two) times daily.       sucralfate (CARAFATE) 1 gram tablet Take 1 g by mouth 4 (four) times daily before meals and nightly.       acetaminophen (TYLENOL) 500 MG tablet       omega 3-dha-epa-fish oil 1,000 mg (120 mg-180 mg) Cap       oxyCODONE-acetaminophen (PERCOCET) 5-325 mg per tablet Take 1 tablet by mouth every 6 (six) hours as needed for Pain. 20 tablet 0     No current facility-administered medications for this visit.        Family History   Problem Relation Age of Onset    No Known Problems Mother     No Known Problems Father        Social History     Socioeconomic History    Marital status: Single     Spouse name: Not on file    Number of children: Not on file    Years of education: Not on file    Highest education level: Not on file   Occupational History    Not on file   Social Needs    Financial resource strain: Not on file    Food insecurity:     Worry: Not on file     Inability: Not on file    Transportation needs:     Medical: Not on file     Non-medical: Not on file   Tobacco Use    Smoking status: Former Smoker     Packs/day: 0.25  "    Years: 50.00     Pack years: 12.50     Types: Cigarettes     Last attempt to quit: 11/15/2018     Years since quittin.7    Smokeless tobacco: Never Used   Substance and Sexual Activity    Alcohol use: Not Currently    Drug use: No    Sexual activity: Not Currently     Partners: Male   Lifestyle    Physical activity:     Days per week: Not on file     Minutes per session: Not on file    Stress: Not on file   Relationships    Social connections:     Talks on phone: Not on file     Gets together: Not on file     Attends Yazdanism service: Not on file     Active member of club or organization: Not on file     Attends meetings of clubs or organizations: Not on file     Relationship status: Not on file   Other Topics Concern    Not on file   Social History Narrative    Not on file       Objective:     Vitals:    19 0854   BP: 123/73   Pulse: 75   Resp: 18   Temp: 98.4 °F (36.9 °C)   Weight: (!) 164 kg (361 lb 8.9 oz)   Height: 5' 6" (1.676 m)   PainSc: 10-Worst pain ever       GEN:  Well developed, well nourished.  No acute distress. No pain behavior.  HEENT:  No trauma.  Mucous membranes moist.  Nares patent bilaterally.  PSYCH: Normal affect. Thought content appropriate.  CHEST:  Breathing symmetric.  No audible wheezing.  ABD:   · Soft, non-distended.    SKIN:  Warm, pink, dry.  No rash on exposed areas.    EXT:  No erythema noted TTP over lateral aspect of knee, 5/5 stregnth in BLE muscles groups, 2+ reflexes bilaterally  No color change or changes in nail or hair growth.  NEURO/MUSCULOSKELETAL:  Fully alert, oriented, and appropriate. Speech normal francesca. No cranial nerve deficits.   Gait: in wheelchair, guarding of the LLE  Motor Strength: 5/5 motor strength throughout lower extremities.   Sensory: hyperesthesia mid left lateral thigh, small area of numbness left lateral knee. Negative straight leg raise bilaterally    Left knee  Inspection: +swelling No erythema or redness. No " bruising.  ROM: Full ROM but with pain on extension.   Palpation: No pes anserine tenderness and positive crepitus. No patellar tendon tenderness and no patellofemoral tendon tenderness. Difficult to assess ligamentous laxity due to habitus.       Imaging:      MRI Left Knee 7/21/2017 (in media):  The medial meniscus is intact. The lateral meniscus is intact.     A chronic complete ACL tear is noted. The posterior cruciate ligament is intact. The medial and lateral retinacula are intact. The medial collateral ligament is intact. The lateral collateral ligament in intact. The posterolateral corner structures are intact.     There is full thickness fissuring of the central aspect medial femoral cartilage. The lateral tibiofemoral compartment cartilage is intact. There is broad thickness defect within the central trochlear cartilage. There is mild lateral patellofemoral cartilage thinning and regional full thickness loss of the central superior patellar cartilage.     A small effusion is present. There is trace infrapatellar fluid. Tiny popliteal cyst is noted. Subtle focal marrow edema is seen within the posterior tibial plateau. The bone marrow signal is heterogeneous likely reflecting marrow reconversion.     There is trace pes anserine bursitis. The tendons are otherwise normal.     Grade 2 fatty streaking of the semimembranosus and vastus lateralis muscles are noted. There is mild prepatellar edema.     MRI Right Shoulder 7/21/2017 (in media):   There is a complete tear of the supraspinatus tendon with retraction to the glenohumeral joint. There is partial tearing of the anterior infraspinatus articular surface. Mild subscapularis tendinosis is noted. The teres minor is intact. A small amount of subacromial/subdeltoid fluid is noted. The proximal long head biceps tendon is poorly visualized proximally although intact distally.     Degenerative tearing of the anterosuperior through posterosuperior labrum is noted.  There is moderate to severe superior glenoid cartilage thinning with subchondral degenerative changes. The glenohumeral ligaments appear grossly intact.     An os acromiale is seen with fluid and osteophytosis at its junction with the base of the acromion. There is moderate superior subluxation of the AC joint with mild osteophytosis.     Small effusion with subcoracoid extension is noted.     There is grade 2/3 fatty atrophy of the supraspinatus muscle, grade 2 of the infraspinatus and subscapularis. Heterogeneous signal is seen throughout the bone marrow likely reflecting marrow reconversion.       Assessment:     Encounter Diagnoses   Name Primary?    Chronic pain disorder Yes    Primary osteoarthritis of left knee     Chronic pain of left knee     Morbid obesity        Plan:     Diagnoses and all orders for this visit:    Chronic pain disorder  -     oxyCODONE-acetaminophen (PERCOCET) 5-325 mg per tablet; Take 1 tablet by mouth 2 (two) times daily as needed for Pain.    Primary osteoarthritis of left knee  -     oxyCODONE-acetaminophen (PERCOCET) 5-325 mg per tablet; Take 1 tablet by mouth 2 (two) times daily as needed for Pain.    Chronic pain of left knee  -     oxyCODONE-acetaminophen (PERCOCET) 5-325 mg per tablet; Take 1 tablet by mouth 2 (two) times daily as needed for Pain.    Morbid obesity  -     oxyCODONE-acetaminophen (PERCOCET) 5-325 mg per tablet; Take 1 tablet by mouth 2 (two) times daily as needed for Pain.       Her pain is consistent with the above.    We discussed the assessment and recommendations.  All available images were reviewed. We discussed the disease process, prognosis, treatment plan, and risks and benefits. The patient is aware of the risks and benefits of the medications being prescribed, common side effects, and proper usage. The following is the plan we agreed on:     1. Unfortunately, knee pain has been refractory to interventional procedures. At this time, we have exhausted  all options for interventional pain management.  2. Patient has tolerated opioids in the past and notes improved function and quality of life while taking opioids for pain for debilitating knee pain. Will prescribed Percocet 5 BID PRN #60 for 1 month. This prescription may be taken over by PCP while in SNF.  3. Recommend increasing gabapentin to previously well tolerated dose of 300mg qAM and 600mg qPM. Documentation of this recommendation was provided to be returned to SNF.  4. Patient does have some paresthesias in left knee which are expected with phenol chemodenervation.   5. Recommended patient followup with bariatric surgery clinic. We made an appointment for her today.   6. RTC in 1 month. At that time, if we are going to continue writing opioids will have patient sign opioid contract and obtain UDS.     The above plan and management options were discussed at length with patient. Patient is in agreement with the above and verbalized understanding.     HENRY Jaramillo MD  [facility] Fellow    I have seen the patient with the fellow physician.  I have performed my own history and physical exam and we have come up with the above plan.  The patient is in agreement with our plan. I agree with the above note which I have edited where appropriate.     Samantha Vidal MD  08/08/2019

## 2019-08-12 ENCOUNTER — TELEPHONE (OUTPATIENT)
Dept: INTERNAL MEDICINE | Facility: CLINIC | Age: 69
End: 2019-08-12

## 2019-08-12 ENCOUNTER — OFFICE VISIT (OUTPATIENT)
Dept: SLEEP MEDICINE | Facility: CLINIC | Age: 69
End: 2019-08-12
Payer: MEDICARE

## 2019-08-12 VITALS
SYSTOLIC BLOOD PRESSURE: 119 MMHG | DIASTOLIC BLOOD PRESSURE: 70 MMHG | HEART RATE: 81 BPM | BODY MASS INDEX: 58.36 KG/M2 | HEIGHT: 66 IN

## 2019-08-12 DIAGNOSIS — E66.9 OBESITY, UNSPECIFIED CLASSIFICATION, UNSPECIFIED OBESITY TYPE, UNSPECIFIED WHETHER SERIOUS COMORBIDITY PRESENT: ICD-10-CM

## 2019-08-12 DIAGNOSIS — G47.33 OBSTRUCTIVE SLEEP APNEA: Primary | ICD-10-CM

## 2019-08-12 PROCEDURE — 99214 OFFICE O/P EST MOD 30 MIN: CPT | Mod: S$PBB,,, | Performed by: NURSE PRACTITIONER

## 2019-08-12 PROCEDURE — 99999 PR PBB SHADOW E&M-EST. PATIENT-LVL III: ICD-10-PCS | Mod: PBBFAC,,, | Performed by: NURSE PRACTITIONER

## 2019-08-12 PROCEDURE — 99213 OFFICE O/P EST LOW 20 MIN: CPT | Mod: PBBFAC | Performed by: NURSE PRACTITIONER

## 2019-08-12 PROCEDURE — 99999 PR PBB SHADOW E&M-EST. PATIENT-LVL III: CPT | Mod: PBBFAC,,, | Performed by: NURSE PRACTITIONER

## 2019-08-12 PROCEDURE — 99214 PR OFFICE/OUTPT VISIT, EST, LEVL IV, 30-39 MIN: ICD-10-PCS | Mod: S$PBB,,, | Performed by: NURSE PRACTITIONER

## 2019-08-12 NOTE — TELEPHONE ENCOUNTER
Attempted to call patient and let her know that Dr. Vail isn't accepting new patients at this time, but no answer.

## 2019-08-12 NOTE — PROGRESS NOTES
"Ca Coats  was seen in follow-up for LAURA management and CPAP equipment check.     CHIEF COMPLAINT:    Chief Complaint   Patient presents with    Sleep Apnea     INTERVAL HISTORY:    08/12/2019 PEGGY Jacobo NP: Continues to use CPAP without breakthrough symptoms. Wheel chair bound currently due to chronic knee pain. When she tries to walk, her "knees buckle" and she's afraid to fall. Needs to have knee surgery, but needs to lose weight first. Was undergoing bariatric surgery work up at [facility], but this has been canceled. Now undergoing bariatric surgery at [facility]. Reports mild lip drying with FFM, humidifier 4/8. Otherwise, denies nasal congestion, pressure intolerance, or air hunger.     CPAP Interrogation: Resmed [id number], CPAP 13 cm, Days Used: 260/30, Days > 4 hours: 26/30, Average Usage: 10.2 hours, Leak 10L/min, Predicted AHI: 0.5, Machine condition: good     08/09/2018 PEGGY Jacobo NP: Continues to use CPAP nightly with resolution of interrupted sleep and snoring. Takes naps in the afternoon as a treat and less as a necessity. Currently undergoing bariatric surgery work up at [facility] and hoping to have procedure done August or September. Continues to use FFM with humidity 8/8 with very mild residual oral drying. Tried climate control tubing but does not like how it restricted her movement in the bed, says tubing required she lay still which she did not like, so she resumed use of standard tubing. Oral drying tolerable, just reported since asked. Denies pressure intolerance. Denies congestion. ESS 15.      CPAP Interrogation: Resmed [id number], CPAP 13 cm, Days Used: 30/30, Days > 4 hours: 30/30, Average Usage: 7.2 hours, Used Hours: 215.8, Leak 12L/min, Predicted AHI: 0.2, Machine condition: good       04/09/2018 PEGGY Jacobo NP: Initial HISTORY OF PRESENT ILLNESS: Ca Coats is a 68 y.o. female is here for sleep evaluation.       Previous [facility] patient. Here to establish care and " "CPAP compliance check after set up of new machine January 2018  No sleep records for my review today    Patient complaints include: interrupted sleep and snoring, resolved with CPAP use. Although still having a little of sleep interruption.   Nocturia x1   Some ongoing fatigue, with known anemia being managed by PCP, due for f/u 4/18/18    Takes Neurontin for chronic leg pain    CPAP Interrogation: Resmed [id number], CPAP 13 cm, Days Used: 30/30, Days > 4 hours: 29/30, Average Usage: 5.8 hours, Used Hours: 174.3, Leak 12L/min, Predicted AHI: 0.2, Machine condition: good     Denies oral/nasal drying with Resmed AirFit F20. Humidity 8/8. Reports dry lips with CPAP use.   Denies pressure intolerance    Denies symptoms of restless legs or kicking during sleep.    Occupation: retired     [place] Sleepiness Scale score during initial sleep evaluation was 15.    SLEEP ROUTINE:      Bed partner:  none  Time to bed:  10-10:30 am  Sleep onset latency: "long time"     Disruptions or awakenings: 3 or more     Wakeup time:   6 am  Perceived sleep quality:  Poor      Previous Sleep Studies:  Received external sleep records 04/09/2018 at 1:47, handed to me by Vandana Kenny MA  02/01/2013 PSG AHI 9.2 with oxygen sathish 84%.   03/29/2013 Titratrion study CPAP 13 cm          PAST MEDICAL HISTORY:    Active Ambulatory Problems     Diagnosis Date Noted    Chronic pain 05/08/2018    Primary osteoarthritis of left knee 07/10/2018    Obstructive sleep apnea 08/09/2018    Chronic knee pain 11/30/2018    Left knee DJD 12/21/2018    Abdominal pain 12/07/2017    Dyspnea on exertion 04/29/2019    Essential hypertension 04/29/2019    Hyperlipidemia 04/29/2019    Major depressive disorder 04/29/2019    Morbid obesity 04/29/2019    Osteoarthritis 04/29/2019    Sleep apnea 04/29/2019    Joint pain 04/29/2019    Gait instability 07/15/2019    At high risk for injury related to fall     Debility     Thyroid disease 07/18/2019    " Left knee pain 2019     Resolved Ambulatory Problems     Diagnosis Date Noted    No Resolved Ambulatory Problems     Past Medical History:   Diagnosis Date    Anemia     Arthritis     BMI 60.0-69.9, adult     Cataract     Depression     Dry eye syndrome     GERD (gastroesophageal reflux disease)     Glaucoma suspect     History of gastric ulcer     Hyperlipidemia     Hypertension     Hypothyroidism     Morbid obesity     Neuromuscular disorder     Thyroid disease                 PAST SURGICAL HISTORY:    Past Surgical History:   Procedure Laterality Date    PQWZVQUZ-KSUHKUVHVZSVGA-VGZRSZ Left 2018    [facility]    BDRABKLA-[id number]-DFGVOC, LEFT Left 3/19/2019    [facility] by [facility]ya [facility]acker [facility] at [facility]    APPENDECTOMY      BLOCK, NERVE Left 7/10/2018    [facility]    Block, Nerve NERVE BLOCK GENICULAR WITH PHENOL 6% Left 2019    [facility] at [facility]    CATARACT EXTRACTION W/  INTRAOCULAR LENS IMPLANT Bilateral     MFIOL OU    cataract surgery   2017    Injection LEFT KNEE STEROID INJECTION Left 2018    [facility]    INJECTION-JOINT Left 2018    [facility] by [facility] at [facility]         FAMILY HISTORY:                Family History   Problem Relation Age of Onset    No Known Problems Mother     No Known Problems Father        SOCIAL HISTORY:          Tobacco:   Social History     Tobacco Use   Smoking Status Former Smoker    Packs/day: 0.25    Years: 50.00    Pack years: 12.50    Types: Cigarettes    Last attempt to quit: 11/15/2018    Years since quittin.7   Smokeless Tobacco Never Used       Alcohol use:    Social History     Substance and Sexual Activity   Alcohol Use Not Currently                 ALLERGIES:  Review of patient's allergies indicates:  No Known  "Allergies    CURRENT MEDICATIONS:    Current Outpatient Medications   Medication Sig Dispense Refill    acetaminophen (TYLENOL) 500 MG tablet       amLODIPine (NORVASC) 5 MG tablet Take 5 mg by mouth once daily.       atorvastatin (LIPITOR) 40 MG tablet Take 40 mg by mouth once daily.       B-complex with vitamin C (Z-BEC OR EQUIV) tablet Take 1 tablet by mouth once daily.       CALCIUM CITRATE-VITAMIN D2 ORAL Take 1 tablet by mouth once daily.       ferrous sulfate 220 mg (44 mg iron)/5 mL solution Take 220 mg by mouth once daily.      gabapentin (NEURONTIN) 300 MG capsule Take 300 mg by mouth 2 (two) times daily.       lisinopril (PRINIVIL,ZESTRIL) 40 MG tablet Take 40 mg by mouth once daily.       multivitamin with minerals tablet Take 1 tablet by mouth once daily.       omega 3-dha-epa-fish oil 1,000 mg (120 mg-180 mg) Cap       oxyCODONE-acetaminophen (PERCOCET) 5-325 mg per tablet Take 1 tablet by mouth 2 (two) times daily as needed for Pain. 60 tablet 0    pantoprazole (PROTONIX) 40 MG tablet Take 40 mg by mouth 2 (two) times daily.       sucralfate (CARAFATE) 1 gram tablet Take 1 g by mouth 4 (four) times daily before meals and nightly.        No current facility-administered medications for this visit.                   REVIEW OF SYSTEMS:  Sleep related symptoms as per HPI. Otherwise, a balance of systems reviewed is negative.          PHYSICAL EXAM:  /70   Pulse 81   Ht 5' 6" (1.676 m)   BMI 58.36 kg/m²    GENERAL: Obese body habitus, well groomed, wheel chair   Weight - not weighed today, in wheelchair and did not want to be weighed. (prior 345 lb)     ASSESSMENT:    Obstructive sleep apnea, mild by AHI. The patient symptomatically has snoring and interrupted sleep resolved with CPAP. The patient is adherent on CPAP and experiencing symptomatic benefit. Medical co-morbidities: systemic HTN, GERD, and obesity.  This warrants treatment for obstructive sleep apnea.      Current every " day smoker, actively in contact with Smoking Cessation Program at [facility], but has not yet quit     Obesity, BMI > 50, discussed relationship between LAURA and weight    PLAN:    Treatment:    -Continue CPAP 13 cm. Live demo humidifier adjustment, adjusted today to 6/8, adjust prn.   -Long term goal of continued weight loss and retesting for sleep apnea at new weight plateau; if significant weight loss before annual visit, pt to call clinic for sleep study order.   -RTC 12 months, sooner if needed.     Education: During our discussion today, we talked about the etiology of obstructive sleep apnea as well as the potential ramifications of untreated sleep apnea, which could include daytime sleepiness, hypertension, heart disease and/or stroke. We discussed potential treatment options, which could include weight loss, body positioning, continuous positive airway pressure (CPAP), OA, EPAP, or referral for surgical consideration.      Precautions: The patient was advised to abstain from driving should they feel sleepy  or drowsy - pt does not drive

## 2019-08-13 ENCOUNTER — TELEPHONE (OUTPATIENT)
Dept: BARIATRICS | Facility: CLINIC | Age: 69
End: 2019-08-13

## 2019-08-13 DIAGNOSIS — Z72.0 TOBACCO ABUSE: Primary | ICD-10-CM

## 2019-08-13 NOTE — TELEPHONE ENCOUNTER
----- Message from Eunice Okeefe sent at 8/13/2019  9:33 AM CDT -----  Reason for call:Pt calling to speak with someone in regards to surgery.        Communication Preference:[phone]    Additional Information:

## 2019-08-13 NOTE — TELEPHONE ENCOUNTER
Reviewed chart and phoned patient. Discussed how incomplete her chart is for setting up surgery.  She stated that she has a complete packet from [facility] where she had CXR, EKG, psych clearance.  Also discussed the PCP clearance and she stated that she does not have PCP, the one she had is not taking new patients and there is a note in the chart to that effect. Appointment made with Edel for additional MDS for clearance and will review the packet for completeness when she comes in next week.

## 2019-08-14 ENCOUNTER — EXTERNAL HOME HEALTH (OUTPATIENT)
Dept: HOME HEALTH SERVICES | Facility: HOSPITAL | Age: 69
End: 2019-08-14
Payer: MEDICARE

## 2019-08-19 ENCOUNTER — DOCUMENTATION ONLY (OUTPATIENT)
Dept: BARIATRICS | Facility: CLINIC | Age: 69
End: 2019-08-19

## 2019-08-19 NOTE — PROGRESS NOTES
Selection Committee Note:  Must complete mmpi and  See Dr. Funes   Must get below BMI of 60    negative nicotine level

## 2019-08-20 ENCOUNTER — CLINICAL SUPPORT (OUTPATIENT)
Dept: BARIATRICS | Facility: CLINIC | Age: 69
End: 2019-08-20
Payer: MEDICARE

## 2019-08-20 ENCOUNTER — LAB VISIT (OUTPATIENT)
Dept: LAB | Facility: HOSPITAL | Age: 69
End: 2019-08-20
Payer: MEDICARE

## 2019-08-20 VITALS — BODY MASS INDEX: 59.79 KG/M2 | WEIGHT: 293 LBS

## 2019-08-20 DIAGNOSIS — F17.201 TOBACCO ABUSE, IN REMISSION: ICD-10-CM

## 2019-08-20 DIAGNOSIS — Z71.3 NUTRITIONAL COUNSELING: ICD-10-CM

## 2019-08-20 DIAGNOSIS — G47.33 OBSTRUCTIVE SLEEP APNEA: ICD-10-CM

## 2019-08-20 DIAGNOSIS — I10 ESSENTIAL HYPERTENSION: ICD-10-CM

## 2019-08-20 DIAGNOSIS — E66.01 MORBID OBESITY WITH BODY MASS INDEX OF 50 OR HIGHER: ICD-10-CM

## 2019-08-20 PROCEDURE — 99999 PR PBB SHADOW E&M-EST. PATIENT-LVL II: ICD-10-PCS | Mod: PBBFAC,,, | Performed by: DIETITIAN, REGISTERED

## 2019-08-20 PROCEDURE — 97803 MED NUTRITION INDIV SUBSEQ: CPT | Mod: PBBFAC | Performed by: DIETITIAN, REGISTERED

## 2019-08-20 PROCEDURE — 99212 OFFICE O/P EST SF 10 MIN: CPT | Mod: PBBFAC | Performed by: DIETITIAN, REGISTERED

## 2019-08-20 PROCEDURE — 99499 NO LOS: ICD-10-PCS | Mod: S$PBB,,, | Performed by: DIETITIAN, REGISTERED

## 2019-08-20 PROCEDURE — 99999 PR PBB SHADOW E&M-EST. PATIENT-LVL II: CPT | Mod: PBBFAC,,, | Performed by: DIETITIAN, REGISTERED

## 2019-08-20 PROCEDURE — 99499 UNLISTED E&M SERVICE: CPT | Mod: S$PBB,,, | Performed by: DIETITIAN, REGISTERED

## 2019-08-20 NOTE — PROGRESS NOTES
NUTRITION NOTE    Referring Physician: Irina Cleveland Md  Reason for MNT Referral: Nutritional Follow up pending Gastric Bypass    Patient presents for 3rd visit for MSD with 6 lb weight gain over the past month; total weight loss by making numerous dietary and lifestyle changes.  Pt lives at [facility] and has an attendant with her today    CLINICAL DATA:  68 y.o. female.    Current Weight: 370 lbs  Weight Change Since Initial Visit: Gain of 3 lbs  Ideal Body Weight: 141 lbs  Body mass index is 59.79 kg/m².   Past Weight: 374 lbs        DAILY NUTRITIONAL NEEDS:  2233 x1.2 activity factor = 2680 - 1000 for wt loss = 1680 Calories (using [address] [address] Jeor Equation)   Grams Protein (1.5-1.8 gm/kg IBW)      CURRENT DIET:  Regular diet.  Diet Recall: Food records are not present.  B scrambled eggs and toast   Coffee cream and sweet-n-low 2 cups per day  L Beans and rice and cornbread  D vegetables no meat fruit  Snack during the day: cookie   No snack before bed    Diet Includes: 3 meals prepared by nursing home.  Pt reports she does not have a choice in selecting from a menu on which foods she would like.  Pt and attendant report that there are alternatives to her meal but they cannot select in advance  Meal Pattern: Irregular.  Protein Supplements: 0 per day. Bought Vanilla powder to mix with 1% milk  Snacking: Adequate. Snacks include sweets.  Vegetables: Likes a variety. Eats almost daily.  Fruits: Likes a variety. Eats once a week.  Beverages: water, coffee without sugar and 1% milk  Dining Out:  Reduced lately. Mostly none.  Cooking at home: Weekly. Mostly baked, grilled, smothered and fried meat, fish, starchy CHO and vegetables.3 meals per day    CURRENT EXERCISE:  None  Pt is in wheelchair    Vitamins / Minerals / Herbs:   B complex with C  Vitamin D  Ferrous sulfate  Multivitamin  Food Allergies:   none  Social:  Retired. Disabled.  Alcohol: None.  Smoking: Sometimes since moved  into [facility]  Had quit before but now pt reports after being in nursing home she started smoking again    ASSESSMENT:  Patient demonstrates some willingness to change lifestyle habits as evidenced by increased vegetables.    Doing poorly with working on greatest challenges (starchy CHO).    Barriers to Education:  lack of motivation  Stage of Change:  contemplation    NUTRITION DIAGNOSIS:  Obesity related to Excessive carbohydrate intake, Inadequate protein intake and Physical inactivity as evidence by BMI.  Status: Same    PLAN:  Pt is potential candidate for surgery.    Diet: Adjust diet plan.  Work on Bariatric Nutrition Checklist.  Work on expanding variety of vegetables.  5-6 meals per day.  Start including protein supplements in the diet plan daily.  Eliminate starchy carbs    Exercise: Increase.    Behavior Modification: Begin to document food & activity logs daily.    Dr. Cleveland would like pt to weigh below 360 lbs    Return to clinic in one month.  Needs additional visit(s) with RD.    Communicated nutrition plan with bariatric team.    SESSION TIME:  30 minutes

## 2019-08-20 NOTE — PATIENT INSTRUCTIONS
Bariatric diet:    - No sweets and high fat foods.    - Eat 2 meals/day, 1 protein shake and 2-3 high protein snacks    - Remember your 80 grams PROTEIN!    - Do not drink with meals. Wait at least 30 minutes after meals to start drinking.      EAT THESE FOODS    High in Protein:        Canned tuna or chicken (packed in water)    Lean ground turkey breast or ground round    Turkey or chicken (no skin); cooked tender and cut in small pieces    Lean pork or beef (cook in crock pot until very tender; cut in small pieces    Scrambled, poached, or boiled eggs    Baked, broiled, grilled or boiled fish and seafood (not fried!)    Silken tofu     Beans and lentils    Lean deli meats (turkey and chicken breast, ham, roast beef)    1% or Skim Milk, Lactaid, or Soymilk    Low-fat or fat-free cottage cheese, soft cheese, mozzarella string cheese, or ricotta    Light yogurt, Greek yogurt, SF pudding    Raw veggies    Lettuce    Plain, Unsalted Nuts and Seeds    Protein bars with 0-4 grams of sugar                AVOID ALL ITEMS BELOW    White and wheat Bread, Rice including brown rice, fried rice, Pasta including whole wheat pasta  Cereals (including grits, oatmeal)   Crackers, Pretzels, Chips   Corn, Popcorn, Peas   White Potatoes, Sweet potatoes including mashed potatoes  Flour and corn tortillas or any other tortillas    High fat milk (whole, 2%)   Butter, margarine, oil, mayonnaise   Sour cream, cream cheese, salad dressing   Ice Cream including sugar free ice cream   Cakes, cookies, pies, desserts and sugar free desserts  Candy   Luncheon meats (bologna, salami, chopped ham)   Sausage, Vinson   Gravy   Fried Foods or Breaded foods  ___________________________________     USE ONLY I CANT BELIEVE ITS NOT BUTTER SPRAY OR EVELYN SPRAYS FOR COOKING    Can use lite bell, fat-free sour cream or fat-free cream cheese  Can use turkey vinson and turkey sausage

## 2019-08-26 LAB
ANABASINE UR-MCNC: 7.3 NG/ML
COTININE UR-MCNC: 624 NG/ML
NICOTINE UR-MCNC: ABNORMAL NG/ML
NORNICOTINE UR-MCNC: 63 NG/ML

## 2019-09-04 ENCOUNTER — OFFICE VISIT (OUTPATIENT)
Dept: PAIN MEDICINE | Facility: CLINIC | Age: 69
End: 2019-09-04
Payer: MEDICARE

## 2019-09-04 VITALS
HEIGHT: 66 IN | HEART RATE: 73 BPM | DIASTOLIC BLOOD PRESSURE: 72 MMHG | SYSTOLIC BLOOD PRESSURE: 129 MMHG | WEIGHT: 293 LBS | RESPIRATION RATE: 18 BRPM | BODY MASS INDEX: 47.09 KG/M2

## 2019-09-04 DIAGNOSIS — G89.29 CHRONIC PAIN OF LEFT KNEE: ICD-10-CM

## 2019-09-04 DIAGNOSIS — M25.562 CHRONIC PAIN OF LEFT KNEE: ICD-10-CM

## 2019-09-04 DIAGNOSIS — E66.01 MORBID OBESITY: ICD-10-CM

## 2019-09-04 DIAGNOSIS — M17.12 PRIMARY OSTEOARTHRITIS OF LEFT KNEE: ICD-10-CM

## 2019-09-04 DIAGNOSIS — M17.12 PRIMARY OSTEOARTHRITIS OF LEFT KNEE: Primary | ICD-10-CM

## 2019-09-04 DIAGNOSIS — G89.4 CHRONIC PAIN DISORDER: ICD-10-CM

## 2019-09-04 PROCEDURE — 99213 OFFICE O/P EST LOW 20 MIN: CPT | Mod: PBBFAC | Performed by: NURSE PRACTITIONER

## 2019-09-04 PROCEDURE — 99213 OFFICE O/P EST LOW 20 MIN: CPT | Mod: S$PBB,,, | Performed by: NURSE PRACTITIONER

## 2019-09-04 PROCEDURE — 99213 PR OFFICE/OUTPT VISIT, EST, LEVL III, 20-29 MIN: ICD-10-PCS | Mod: S$PBB,,, | Performed by: NURSE PRACTITIONER

## 2019-09-04 PROCEDURE — 99999 PR PBB SHADOW E&M-EST. PATIENT-LVL III: CPT | Mod: PBBFAC,,, | Performed by: NURSE PRACTITIONER

## 2019-09-04 PROCEDURE — 99999 PR PBB SHADOW E&M-EST. PATIENT-LVL III: ICD-10-PCS | Mod: PBBFAC,,, | Performed by: NURSE PRACTITIONER

## 2019-09-04 RX ORDER — GABAPENTIN 300 MG/1
600 CAPSULE ORAL 2 TIMES DAILY
Qty: 120 CAPSULE | Refills: 4 | Status: SHIPPED | OUTPATIENT
Start: 2019-09-04 | End: 2019-12-10

## 2019-09-04 RX ORDER — OXYCODONE AND ACETAMINOPHEN 5; 325 MG/1; MG/1
1 TABLET ORAL 2 TIMES DAILY PRN
Qty: 60 TABLET | Refills: 0 | Status: SHIPPED | OUTPATIENT
Start: 2019-09-06 | End: 2019-10-04 | Stop reason: SDUPTHER

## 2019-09-04 NOTE — PROGRESS NOTES
"Subjective:     Patient ID: Ca Coats is a 68 y.o. female.    Chief Complaint: Pain    Consulted by: Self      Disclaimer: This note was generated using voice recognition software.  There may be a typographical errors that were missed during proofreading.      HPI:    Ca Coats is a 68 y.o. female who presents today with left knee pain. Her pain has been going on for "too long."  She was previously treated by Dr. Iyer at [facility].  She has injections [id number] with him.  These were helpful, but she does not know if the injections were steroid or viscosupplementation.   This pain is described in detail below.    Interval History (4/30/2018):  The patient returns to clinic today for follow up and records review. We did received records from [facility] including a MRI of her knee. Dr. Iyer's last office visit note from January 2017 does not include any previous injections. She continues to report bilateral knee pain, left greater than right. She describes this pain as constant and aching. This pain is worse with prolonged walking. She also report right shoulder pain with prolonged overhead activity. She continues to take Percocet with benefit. She denies any other health changes.    Interval History (5/31/2018):  The patient returns to clinic for follow up. She is s/p left knee Synvisc One injection on 5/8/2018. She reports 35% relief of her knee pain. She continues to report bilateral knee pain, left greater than right. She describes this pain as constant and aching. Her pain is worse with prolonged walking and standing. She is scheduled to see Orthopedics at the  End of this month. She is also following up with Bariatrics to discuss the lap band procedure. She continues to take Percocet with benefit. She also uses Voltaren gel with benefit but this is expensive for her. She denies any other health changes.         Interval History (7/2/2018):  The patient returns to clinic today for " "follow up. She continues to report left knee pain that is constant, sharp, and aching in nature. Her pain is worse with prolonged walking and standing. She is scheduled for weight loss surgery in August. She continues follow up with orthopedics who does not recommend surgery at this time due to her BMI. She continues to take Percocet as needed with benefit. She denies any other health changes.     Interval History (7/19/2018):  The patient returns to clinic today for follow up. She is s/p left genicular nerve block on 7/10/2018. She reports 80% relief of her left knee pain. She reports that she was able to walk for longer distances (3 blocks) after the block. Her pain has returned to baseline. She continues to report left knee pain that is constant, sharp, and aching in nature. Her pain is worse with prolonged walking and standing. She denies any other health changes.     Interval History (8/21/2018):  The patient returns to clinic today for follow up. She is s/p left genicular cooled RFA on 7/31/2018. She reports 60% relief of her left knee pain. She reports intermittent knee pain that is sore and aching in nature. She continues to perform a home exercise routine. She is actively trying to lose weight. She continues to follow up with Bariatric Surgery at [facility]. She is considering weight loss surgery. She continues to use compound cream with benefit. She denies any other health changes.     Interval History (11/21/2018):  The patient returns to clinic today for follow up. She reports increased left knee pain this past week. She reports 2 episodes where her knee has "locked" up on her while walking. She describes her knee pain as sore and aching. She is actively trying to lose weight and quit smoking. She continues to use the compound cream with benefit. She denies any other health changes.     Interval History (1/9/2019):  The patient returns to clinic today for follow up. She is s/p left knee steroid injection on " "12/21/2018. She reports one day of relief. She continues to report left knee pain that is constant, sharp, and aching in nature. Her pain is worse with standing and walking. She reports that her knee "locks" up on her while walking. She often feels as though she is going to fall. She is scheduled for bariatric weight loss surgery in February at [facility]. She denies any other health changes.     Interval History (2/28/2019):  The patient returns to clinic today for follow up. She is s/p Euflexxa series completed on 1/30/2019. She reports that she able to stand for longer periods of time since the procedure. She continues to report left knee pain that is sore and aching in nature. Her pain is worse with prolonged standing and walking. She was previously scheduled for weight loss surgery but reports this was canceled. She is scheduled for follow up next week about this. She denies any other health changes.     Interval History (4/12/2019):  The patient returns for f/u of left knee pain.  She is s/p left genicular cooled RFA with about 90% pain relief.  She has been able to walk easier.  She also notices less stiffness in her knee.  She is planning on gastric surgery prior to knee replacement.  She had benefit with compounding cream but it is no longer covered by her insurance.  She does take Percocet from her PCP.  Her pain today is 7/10.    Interval History (7/11/19):  Patient reported to ED today for increased L knee pain and an episode of LLE weakness that lead to a near fall. Patient is s/p L genicular RFA in 3/19/19 that provided 90% relief for 2 months then came back. Pain is a crunchy pain, in left knee, that does not radiate, worse with movement, better with rest. Pain today is 9/10. She normally uses cane to ambulate but is currently using wheelchair at hospital. Denies any trauma ot the area, fever/chills, n/v, abdominal pain, or HA.    Interval History (8/8/2019):  She returns today for follow up s/p left knee " genicular chemodenervation with phenol 7/19/19.  She reports that this procedure did not provide any improvement in her left knee symptoms, and she is still having significant difficulty with ambulation, still uses wheelchair for mobility. She also reports left lateral thigh and hip pain lateral upper thigh numbness. She has been admitted to [facility] [place] but is not receiving PT there. She is still considering lap band surgery but has missed a followup appointment.     Interval History (9/4/2019):  The patient returns to clinic today for follow up. She continues to report intermittent left lateral thigh and hip pain that is tingling and numb in nature. She continues to report left knee pain. She continues to have difficulty ambulating due to pain. She is currently living in a SNF. She continues to take Gabapentin 300 mg BID. This was increased at last visit but not increased at the nursing home. She does report benefit with Percocet though it only last approximately 4-5 hours. She denies any adverse effects. She does present with a care attendant from the SNF today. Her pain today is 10/10.    Physical Therapy: Yes, she did this in 2018 for one month (twice a week). She does HEP (stretching and ROM in the morning)    Non-pharmacologic Treatment:     · Ice/Heat: Heat is more helpful than ice  · TENS: Not tried  · Massage: Not tried  · Chiropractic care: Not tried  · Acupuncture: Not tried  · Other: Uses a cane         Pain Medications:         · Currently taking: Gabapentin 300 mg/600 mg, tylenol 500mg Q6 prn    · Has tried in the past:  Ibuprofen (still takes sometimes), Voltaren gel    · Has not tried: Muscle relaxants, TCAs, SNRIs, Lyrica, compound topical creams    Blood thinners: None    Interventional Therapies: B3Sezzd injections by Dr. Iyer were helpful  · 5/8/2018- Left knee Synvisc One injection  · 7/10/2018- Left genicular nerve block  · 7/31/2018- Left genicular cooled RFA  · 12/21/2018- Left  knee steroid injection  · 1/31/2019- Left knee Euflexxa series  · 3/19/2019 Left genicular cooled RFA- 90% relief  · 7/19/19- Left knee genicular phenol chemodenervation-no relief    Relevant Surgeries: None    Affecting sleep? No    Affecting daily activities? Yes    Depressive symptoms? No          · SI/HI? No    Work status: No, on disability due to her knee    Prescription Monitoring Program database:  Reviewed and consistent with medication use as prescribed.      Last 3 PDI Scores 8/8/2019 7/11/2019 4/12/2019   Pain Disability Index (PDI) 70 66 [phone]   ]    GENERAL: +chills No weight loss, malaise or fevers.  HEENT:   No recent changes in vision or hearing  NECK:  Negative for lumps, no difficulty with swallowing.  RESPIRATORY:  Negative for cough, wheezing or shortness of breath, patient denies any recent URI.  CARDIOVASCULAR:  Negative for chest pain, leg swelling or palpitations.  GI:  Negative for abdominal discomfort, blood in stools or black stools or change in bowel habits.  MUSCULOSKELETAL:  See HPI.  SKIN:  Negative for lesions, rash, and itching.  PSYCH:  Negative for mood disorder or recent psychosocial stressors.    HEMATOLOGY/LYMPHOLOGY:  Negative for prolonged bleeding, bruising easily or swollen nodes.    ENDO: No history of diabetes or thyroid dysfunction  NEURO:   No history of headaches, syncope, paralysis, seizures or tremors.  All other reviewed and negative other than HPI.          Past Medical History:   Diagnosis Date    Anemia     2018    Arthritis     BMI 60.0-69.9, adult     Cataract     Depression     Dry eye syndrome     GERD (gastroesophageal reflux disease)     Glaucoma suspect     History of gastric ulcer     Hyperlipidemia     Hypertension     Hypothyroidism     Morbid obesity     Neuromuscular disorder     Thyroid disease        Past Surgical History:   Procedure Laterality Date    [id number]-[id number] Left 7/31/2018    Per[facility] at  [facility]    [id number]-[id number]-ENCXJG, LEFT Left 3/19/2019    Performed by Priya Thacker [facility] at [facility]    APPENDECTOMY      BLOCK, NERVE Left 7/10/2018    [facility] at [facility]    Block, Nerve NERVE BLOCK GENICULAR WITH PHENOL 6% Left 7/19/2019    Performed by [facility] at [facility]    CATARACT EXTRACTION W/  INTRAOCULAR LENS IMPLANT Bilateral     MFIOL OU    cataract surgery   2017    Injection LEFT KNEE STEROID INJECTION Left 12/21/2018    Performed by [facility] at [facility]    INJECTION-JOINT Left 5/8/2018    Performed by Samantha Vidal [facility] at [facility]       Review of patient's allergies indicates:  No Known Allergies    Current Outpatient Medications   Medication Sig Dispense Refill    acetaminophen (TYLENOL) 500 MG tablet       amLODIPine (NORVASC) 5 MG tablet Take 5 mg by mouth once daily.       atorvastatin (LIPITOR) 40 MG tablet Take 40 mg by mouth once daily.       B-complex with vitamin C (Z-BEC OR EQUIV) tablet Take 1 tablet by mouth once daily.       CALCIUM CITRATE-VITAMIN D2 ORAL Take 1 tablet by mouth once daily.       ferrous sulfate 220 mg (44 mg iron)/5 mL solution Take 220 mg by mouth once daily.      gabapentin (NEURONTIN) 300 MG capsule Take 300 mg by mouth 2 (two) times daily.       lisinopril (PRINIVIL,ZESTRIL) 40 MG tablet Take 40 mg by mouth once daily.       multivitamin with minerals tablet Take 1 tablet by mouth once daily.       omega 3-dha-epa-fish oil 1,000 mg (120 mg-180 mg) Cap       oxyCODONE-acetaminophen (PERCOCET) 5-325 mg per tablet Take 1 tablet by mouth 2 (two) times daily as needed for Pain. 60 tablet 0    pantoprazole (PROTONIX) 40 MG tablet Take 40 mg by mouth 2 (two) times daily.       sucralfate (CARAFATE) 1 gram tablet Take 1 g by mouth 4 (four) times daily before meals and nightly.        No current facility-administered medications for this visit.        Family  "History   Problem Relation Age of Onset    No Known Problems Mother     No Known Problems Father        Social History     Socioeconomic History    Marital status: Single     Spouse name: Not on file    Number of children: Not on file    Years of education: Not on file    Highest education level: Not on file   Occupational History    Not on file   Social Needs    Financial resource strain: Not on file    Food insecurity:     Worry: Not on file     Inability: Not on file    Transportation needs:     Medical: Not on file     Non-medical: Not on file   Tobacco Use    Smoking status: Former Smoker     Packs/day: 0.25     Years: 50.00     Pack years: 12.50     Types: Cigarettes     Last attempt to quit: 11/15/2018     Years since quittin.8    Smokeless tobacco: Never Used   Substance and Sexual Activity    Alcohol use: Not Currently    Drug use: No    Sexual activity: Not Currently     Partners: Male   Lifestyle    Physical activity:     Days per week: Not on file     Minutes per session: Not on file    Stress: Not on file   Relationships    Social connections:     Talks on phone: Not on file     Gets together: Not on file     Attends Bahai service: Not on file     Active member of club or organization: Not on file     Attends meetings of clubs or organizations: Not on file     Relationship status: Not on file   Other Topics Concern    Not on file   Social History Narrative    Not on file       Objective:     Vitals:    19 0924   BP: 129/72   Pulse: 73   Resp: 18   Weight: (!) 166.7 kg (367 lb 8.1 oz)   Height: 5' 6" (1.676 m)   PainSc: 10-Worst pain ever   PainLoc: Hip       GEN:  Well developed, well nourished.  No acute distress. No pain behavior.  HEENT:  No trauma.  Mucous membranes moist.  Nares patent bilaterally.  PSYCH: Normal affect. Thought content appropriate.  CHEST:  Breathing symmetric.  No audible wheezing.  ABD:   · Soft, non-distended.    SKIN:  Warm, pink, dry.  No " rash on exposed areas.    EXT:  No erythema noted TTP over lateral aspect of knee, 5/5 stregnth in BLE muscles groups, 2+ reflexes bilaterally  No color change or changes in nail or hair growth.  NEURO/MUSCULOSKELETAL:  Fully alert, oriented, and appropriate. Speech normal francesca. No cranial nerve deficits.   Gait: in wheelchair, guarding of the LLE  Motor Strength: 5/5 motor strength throughout lower extremities.   Sensory: hyperesthesia mid left lateral thigh, small area of numbness left lateral knee. Negative straight leg raise bilaterally.     Left knee  Inspection: +swelling No erythema or redness. No bruising.  ROM: Full ROM but with pain on flexion and extension.   Palpation: No pes anserine tenderness and positive crepitus. No patellar tendon tenderness and no patellofemoral tendon tenderness. Difficult to assess ligamentous laxity due to habitus.       Imaging:      MRI Left Knee 7/21/2017 (in media):  The medial meniscus is intact. The lateral meniscus is intact.     A chronic complete ACL tear is noted. The posterior cruciate ligament is intact. The medial and lateral retinacula are intact. The medial collateral ligament is intact. The lateral collateral ligament in intact. The posterolateral corner structures are intact.     There is full thickness fissuring of the central aspect medial femoral cartilage. The lateral tibiofemoral compartment cartilage is intact. There is broad thickness defect within the central trochlear cartilage. There is mild lateral patellofemoral cartilage thinning and regional full thickness loss of the central superior patellar cartilage.     A small effusion is present. There is trace infrapatellar fluid. Tiny popliteal cyst is noted. Subtle focal marrow edema is seen within the posterior tibial plateau. The bone marrow signal is heterogeneous likely reflecting marrow reconversion.     There is trace pes anserine bursitis. The tendons are otherwise normal.     Grade 2 fatty  streaking of the semimembranosus and vastus lateralis muscles are noted. There is mild prepatellar edema.     MRI Right Shoulder 7/21/2017 (in media):   There is a complete tear of the supraspinatus tendon with retraction to the glenohumeral joint. There is partial tearing of the anterior infraspinatus articular surface. Mild subscapularis tendinosis is noted. The teres minor is intact. A small amount of subacromial/subdeltoid fluid is noted. The proximal long head biceps tendon is poorly visualized proximally although intact distally.     Degenerative tearing of the anterosuperior through posterosuperior labrum is noted. There is moderate to severe superior glenoid cartilage thinning with subchondral degenerative changes. The glenohumeral ligaments appear grossly intact.     An os acromiale is seen with fluid and osteophytosis at its junction with the base of the acromion. There is moderate superior subluxation of the AC joint with mild osteophytosis.     Small effusion with subcoracoid extension is noted.     There is grade 2/3 fatty atrophy of the supraspinatus muscle, grade 2 of the infraspinatus and subscapularis. Heterogeneous signal is seen throughout the bone marrow likely reflecting marrow reconversion.       Assessment:     Encounter Diagnoses   Name Primary?    Primary osteoarthritis of left knee Yes    Chronic pain of left knee     Chronic pain disorder        Plan:     Ca was seen today for knee pain and hip pain.    Diagnoses and all orders for this visit:    Primary osteoarthritis of left knee  -     gabapentin (NEURONTIN) 300 MG capsule; Take 2 capsules (600 mg total) by mouth 2 (two) times daily.    Chronic pain of left knee  -     gabapentin (NEURONTIN) 300 MG capsule; Take 2 capsules (600 mg total) by mouth 2 (two) times daily.    Chronic pain disorder  -     gabapentin (NEURONTIN) 300 MG capsule; Take 2 capsules (600 mg total) by mouth 2 (two) times daily.       Her pain is consistent  with the above.    We discussed the assessment and recommendations.  All available images were reviewed. We discussed the disease process, prognosis, treatment plan, and risks and benefits. The patient is aware of the risks and benefits of the medications being prescribed, common side effects, and proper usage. The following is the plan we agreed on:     1. Unfortunately, knee pain has been refractory to interventional procedures. At this time, we have exhausted all options for interventional pain management.  2. Patient has tolerated opioids in the past and notes improved function and quality of life while taking opioids for pain for debilitating knee pain. She is a candidate for palliative care at this time.   3. Continue Percocet 5/325 mg BID PRN, #60.   4. Pain Contract signed today.   5. UDS next visit.  6. Increase Gabapentin to 600 mg BID.   7. Patient does have some paresthesias in left knee which are expected with phenol chemodenervation.   8. Recommended patient followup with bariatric surgery clinic.   9. RTC in 1 month.    - Dr. Vidal was consulted on the patient and agrees with this plan.    The above plan and management options were discussed at length with patient. Patient is in agreement with the above and verbalized understanding.     Savanna Hyatt NP  09/04/2019

## 2019-09-12 ENCOUNTER — TELEPHONE (OUTPATIENT)
Dept: SMOKING CESSATION | Facility: CLINIC | Age: 69
End: 2019-09-12

## 2019-09-12 NOTE — TELEPHONE ENCOUNTER
First attempt regarding smoking cessation quit 2 episode ( 1 yr f/u), unable to leave message due to no answering service available.

## 2019-09-24 ENCOUNTER — CLINICAL SUPPORT (OUTPATIENT)
Dept: BARIATRICS | Facility: CLINIC | Age: 69
End: 2019-09-24
Payer: MEDICARE

## 2019-09-24 VITALS — WEIGHT: 293 LBS | BODY MASS INDEX: 59.21 KG/M2

## 2019-09-24 DIAGNOSIS — Z71.3 NUTRITIONAL COUNSELING: ICD-10-CM

## 2019-09-24 DIAGNOSIS — I10 ESSENTIAL HYPERTENSION: ICD-10-CM

## 2019-09-24 DIAGNOSIS — E66.01 MORBID OBESITY WITH BODY MASS INDEX OF 50 OR HIGHER: ICD-10-CM

## 2019-09-24 DIAGNOSIS — G47.33 OBSTRUCTIVE SLEEP APNEA: ICD-10-CM

## 2019-09-24 PROCEDURE — 97803 MED NUTRITION INDIV SUBSEQ: CPT | Mod: PBBFAC | Performed by: DIETITIAN, REGISTERED

## 2019-09-24 PROCEDURE — 99999 PR PBB SHADOW E&M-EST. PATIENT-LVL II: CPT | Mod: PBBFAC,,, | Performed by: DIETITIAN, REGISTERED

## 2019-09-24 PROCEDURE — 99499 UNLISTED E&M SERVICE: CPT | Mod: S$PBB,,, | Performed by: DIETITIAN, REGISTERED

## 2019-09-24 PROCEDURE — 99499 NO LOS: ICD-10-PCS | Mod: S$PBB,,, | Performed by: DIETITIAN, REGISTERED

## 2019-09-24 PROCEDURE — 99999 PR PBB SHADOW E&M-EST. PATIENT-LVL II: ICD-10-PCS | Mod: PBBFAC,,, | Performed by: DIETITIAN, REGISTERED

## 2019-09-24 PROCEDURE — 99212 OFFICE O/P EST SF 10 MIN: CPT | Mod: PBBFAC | Performed by: DIETITIAN, REGISTERED

## 2019-09-24 NOTE — PROGRESS NOTES
NUTRITION NOTE    Referring Physician: Irina Cleveland MD  Reason for MNT Referral: Nutrition follow up pending  Gastric Bypass.  Needs to lose down to 360 lbs     Patient presents for 4th visit for MSD with weight loss over the past month; total weight loss by making numerous dietary and lifestyle changes.    CLINICAL DATA:  68 y.o. female.    Last Weight: 367 lbs  Weight Change Since Initial Visit: 0 lbs  Ideal Body Weight:141lbs  There is no height or weight on file to calculate BMI.   Current Weight: 370 lbs        DAILY NUTRITIONAL NEEDS:  2233 x1.2 activity factor = 2680 - 1000 for wt loss = 1680 Calories (using [place] Jeor Equation)   Grams Protein (1.5-1.8 gm/kg IBW)     CURRENT DIET:  Reduced-calorie diet.  Diet Recall: Food records are not present.  Breakfast: Scrambled or boiled egg with coffee ff milk sweetnlow  Lunch: 2% milk tried with [place] Instant Breakfast and banana  Dinner: Chicken with  corn or green beans at times or broccoli or carrots or zucchini  Atkins protein bars as snacks      Diet Includes: Cut out bread  Meal Pattern: Improved.  Protein Supplements: 1 per day.using [place] Instant Breakfast which has more than 4 gms of sugar per serving  CIB has 5 gms of protein and 19 gms of sugar per packet)  Snacking: Adequate. Snacks include healthy choices.  Vegetables: Likes a variety. Eats daily.  Fruits: Likes a few. Eats rarely.  Beverages: water and coffee without sugar  Dining Out:  Never. Mostly never.  Cooking at home: Daily. Mostly baked and grilled meat, fish and vegetables.    CURRENT EXERCISE:  None  Pt uses wheelchair    Vitamins / Minerals / Herbs:   B complex with C  Vitamin D  Ferrous sulfate  Multivitamin    Food Allergies:   None    Social:  Retired. Disabled.  Alcohol: None.Smoking: Trying to quit.5-6 cigarettes per day    ASSESSMENT:  Patient demonstrates some willingness to change lifestyle habits as evidenced by weight loss, including protein drinks and  increased vegetables.    Doing fairly well with working on greatest challenges (starchy CHO).    Barriers to Education:  none and environmental  Lives in nursing home which supplies her with 3 meals per day  Stage of Change:  action    NUTRITION DIAGNOSIS:  Obesity related to Excessive carbohydrate intake, Inadequate protein intake and Physical inactivity as evidence by BMI.  Status: Same    PLAN:  Pt is potential candidate for surgery.    Diet: Adjust diet plan.  5-6 meals per day.  Return to clinic.  decrease amount of starchy carbs in diet and start drinking a protein shake with 4 gms of sugar or less   Use skim or 1% milk    Exercise: as tolerated.    Behavior Modification: Begin to document food & activity logs daily.        Return to clinic in one month.  Needs additional visit(s) with RD.    Communicated nutrition plan with bariatric team.    SESSION TIME:  30 minutes

## 2019-10-03 ENCOUNTER — TELEPHONE (OUTPATIENT)
Dept: PAIN MEDICINE | Facility: CLINIC | Age: 69
End: 2019-10-03

## 2019-10-03 NOTE — TELEPHONE ENCOUNTER
Good morning,afternoon Mr./Mrs.    My name is Lamar I am contacting you from [facility] regarding your appointment scheduled for,10/03/2019 with, Savanna Hyatt  just confirming you will be able to make it.    Staff contacted pt no voicemail set up   MD

## 2019-10-03 NOTE — TELEPHONE ENCOUNTER
Good morning,afternoon Mr./Mrs.    My name is Lamar I am contacting you from [facility] regarding your appointment scheduled for, 10/04/2019 with, Savanna Hyatt just confirming you will be able to make it.        Staff contacted pt no voicemail set up phone just drake CURIEL

## 2019-10-04 ENCOUNTER — CLINICAL SUPPORT (OUTPATIENT)
Dept: INTERNAL MEDICINE | Facility: CLINIC | Age: 69
End: 2019-10-04
Payer: MEDICARE

## 2019-10-04 ENCOUNTER — OFFICE VISIT (OUTPATIENT)
Dept: PAIN MEDICINE | Facility: CLINIC | Age: 69
End: 2019-10-04
Payer: MEDICARE

## 2019-10-04 VITALS
WEIGHT: 293 LBS | SYSTOLIC BLOOD PRESSURE: 133 MMHG | RESPIRATION RATE: 18 BRPM | TEMPERATURE: 97 F | HEIGHT: 66 IN | DIASTOLIC BLOOD PRESSURE: 75 MMHG | HEART RATE: 77 BPM | BODY MASS INDEX: 47.09 KG/M2

## 2019-10-04 DIAGNOSIS — M25.562 CHRONIC PAIN OF LEFT KNEE: ICD-10-CM

## 2019-10-04 DIAGNOSIS — Z23 IMMUNIZATION DUE: Primary | ICD-10-CM

## 2019-10-04 DIAGNOSIS — G89.29 CHRONIC PAIN OF LEFT KNEE: ICD-10-CM

## 2019-10-04 DIAGNOSIS — Z51.81 ENCOUNTER FOR MONITORING OPIOID MAINTENANCE THERAPY: ICD-10-CM

## 2019-10-04 DIAGNOSIS — G89.4 CHRONIC PAIN DISORDER: ICD-10-CM

## 2019-10-04 DIAGNOSIS — M17.12 PRIMARY OSTEOARTHRITIS OF LEFT KNEE: Primary | ICD-10-CM

## 2019-10-04 DIAGNOSIS — E66.01 MORBID OBESITY: ICD-10-CM

## 2019-10-04 DIAGNOSIS — Z79.891 ENCOUNTER FOR MONITORING OPIOID MAINTENANCE THERAPY: ICD-10-CM

## 2019-10-04 DIAGNOSIS — M17.12 PRIMARY OSTEOARTHRITIS OF LEFT KNEE: ICD-10-CM

## 2019-10-04 PROCEDURE — 99999 PR PBB SHADOW E&M-EST. PATIENT-LVL III: CPT | Mod: PBBFAC,,, | Performed by: NURSE PRACTITIONER

## 2019-10-04 PROCEDURE — 99999 PR PBB SHADOW E&M-EST. PATIENT-LVL III: ICD-10-PCS | Mod: PBBFAC,,, | Performed by: NURSE PRACTITIONER

## 2019-10-04 PROCEDURE — 99214 OFFICE O/P EST MOD 30 MIN: CPT | Mod: S$PBB,,, | Performed by: NURSE PRACTITIONER

## 2019-10-04 PROCEDURE — 99214 PR OFFICE/OUTPT VISIT, EST, LEVL IV, 30-39 MIN: ICD-10-PCS | Mod: S$PBB,,, | Performed by: NURSE PRACTITIONER

## 2019-10-04 PROCEDURE — 99213 OFFICE O/P EST LOW 20 MIN: CPT | Mod: PBBFAC,25 | Performed by: NURSE PRACTITIONER

## 2019-10-04 PROCEDURE — 90662 IIV NO PRSV INCREASED AG IM: CPT | Mod: PBBFAC

## 2019-10-04 PROCEDURE — 80307 DRUG TEST PRSMV CHEM ANLYZR: CPT

## 2019-10-04 RX ORDER — OXYCODONE AND ACETAMINOPHEN 5; 325 MG/1; MG/1
1 TABLET ORAL 2 TIMES DAILY PRN
Qty: 60 TABLET | Refills: 0 | Status: SHIPPED | OUTPATIENT
Start: 2019-10-04 | End: 2019-11-03

## 2019-10-04 NOTE — PROGRESS NOTES
"Subjective:     Patient ID: Ca Coats is a 68 y.o. female.    Chief Complaint: Pain    Consulted by: Self      Disclaimer: This note was generated using voice recognition software.  There may be a typographical errors that were missed during proofreading.      HPI:    Ca Coats is a 68 y.o. female who presents today with left knee pain. Her pain has been going on for "too long."  She was previously [place] by [place]. [place].  She has injections [id number] with him.  These were helpful, but she does not know if the injections were steroid or viscosupplementation.   This pain is described in detail below.    Interval History (4/30/2018):  The patient returns to clinic today for follow up and records review. We did received records from [place] including a MRI of her knee. Dr. Iyer's last office visit note from January 2017 does not include any previous injections. She continues to report bilateral knee pain, left greater than right. She describes this pain as constant and aching. This pain is worse with prolonged walking. She also report right shoulder pain with prolonged overhead activity. She continues to take Percocet with benefit. She denies any other health changes.    Interval History (5/31/2018):  The patient returns to clinic for follow up. She is s/p left knee Synvisc One injection on 5/8/2018. She reports 35% relief of her knee pain. She continues to report bilateral knee pain, left greater than right. She describes this pain as constant and aching. Her pain is worse with prolonged walking and standing. She is scheduled to see Orthopedics at the  End of this month. She is also following up with Bariatrics to discuss the lap band procedure. She continues to take Percocet with benefit. She also uses Voltaren gel with benefit but this is expensive for her. She denies any other health changes.         Interval History (7/2/2018):  The patient returns to clinic today for " "follow up. She continues to report left knee pain that is constant, sharp, and aching in nature. Her pain is worse with prolonged walking and standing. She is scheduled for weight loss surgery in August. She continues follow up with orthopedics who does not recommend surgery at this time due to her BMI. She continues to take Percocet as needed with benefit. She denies any other health changes.     Interval History (7/19/2018):  The patient returns to clinic today for follow up. She is s/p left genicular nerve block on 7/10/2018. She reports 80% relief of her left knee pain. She reports that she was able to walk for longer distances (3 blocks) after the block. Her pain has returned to baseline. She continues to report left knee pain that is constant, sharp, and aching in nature. Her pain is worse with prolonged walking and standing. She denies any other health changes.     Interval History (8/21/2018):  The patient returns to clinic today for follow up. She is s/p left genicular cooled RFA on 7/31/2018. She reports 60% relief of her left knee pain. She reports intermittent knee pain that is sore and aching in nature. She continues to perform a home exercise routine. She is actively trying to lose weight. She continues to follow up with Bariatric Surgery at [facility]. She is considering weight loss surgery. She continues to use compound cream with benefit. She denies any other health changes.     Interval History (11/21/2018):  The patient returns to clinic today for follow up. She reports increased left knee pain this past week. She reports 2 episodes where her knee has "locked" up on her while walking. She describes her knee pain as sore and aching. She is actively trying to lose weight and quit smoking. She continues to use the compound cream with benefit. She denies any other health changes.     Interval History (1/9/2019):  The patient returns to clinic today for follow up. She is s/p left knee steroid injection on " "12/21/2018. She reports one day of relief. She continues to report left knee pain that is constant, sharp, and aching in nature. Her pain is worse with standing and walking. She reports that her knee "locks" up on her while walking. She often feels as though she is going to fall. She is scheduled for bariatric weight loss surgery in February at [facility]. She denies any other health changes.     Interval History (2/28/2019):  The patient returns to clinic today for follow up. She is s/p Euflexxa series completed on 1/30/2019. She reports that she able to stand for longer periods of time since the procedure. She continues to report left knee pain that is sore and aching in nature. Her pain is worse with prolonged standing and walking. She was previously scheduled for weight loss surgery but reports this was canceled. She is scheduled for follow up next week about this. She denies any other health changes.     Interval History (4/12/2019):  The patient returns for f/u of left knee pain.  She is s/p left genicular cooled RFA with about 90% pain relief.  She has been able to walk easier.  She also notices less stiffness in her knee.  She is planning on gastric surgery prior to knee replacement.  She had benefit with compounding cream but it is no longer covered by her insurance.  She does take Percocet from her PCP.  Her pain today is 7/10.    Interval History (7/11/19):  Patient reported to ED today for increased L knee pain and an episode of LLE weakness that lead to a near fall. Patient is s/p L genicular RFA in 3/19/19 that provided 90% relief for 2 months then came back. Pain is a crunchy pain, in left knee, that does not radiate, worse with movement, better with rest. Pain today is 9/10. She normally uses cane to ambulate but is currently using wheelchair at hospital. Denies any trauma ot the area, fever/chills, n/v, abdominal pain, or HA.    Interval History (8/8/2019):  She returns today for follow up s/p left knee " genicular chemodenervation with phenol 7/19/19.  She reports that this procedure did not provide any improvement in her left knee symptoms, and she is still having significant difficulty with ambulation, still uses wheelchair for mobility. She also reports left lateral thigh and hip pain lateral upper thigh numbness. She has been admitted to [facility] but is not receiving PT there. She is still considering lap band surgery but has missed a followup appointment.     Interval History (9/4/2019):  The patient returns to clinic today for follow up. She continues to report intermittent left lateral thigh and hip pain that is tingling and numb in nature. She continues to report left knee pain. She continues to have difficulty ambulating due to pain. She is currently living in a SNF. She continues to take Gabapentin 300 mg BID. This was increased at last visit but not increased at the nursing home. She does report benefit with Percocet though it only last approximately 4-5 hours. She denies any adverse effects. She does present with a care attendant from the SNF today. Her pain today is 10/10.    Interval History (10/4/2019):  The patient returns to clinic today for follow up and medication refill. She continues to report bilateral knee pain that is aching in nature. She reports intermittent left lateral thigh pain that is tingling and shooting in nature. She reports that her pain has been improving since last visit. She has increased her activity. She continues to report benefit with Gabapentin. She continues to take Percocet as needed with benefit. She denies any other health changes. Her pain today is 0/10.    Physical Therapy: Yes, she did this in 2018 for one month (twice a week). She does HEP (stretching and ROM in the morning)    Non-pharmacologic Treatment:     · Ice/Heat: Heat is more helpful than ice  · TENS: Not tried  · Massage: Not tried  · Chiropractic care: Not tried  · Acupuncture: Not  tried  · Other: Uses a cane         Pain Medications:         · Currently taking: Gabapentin, tylenol 500mg Q6 prn, Perocet    · Has tried in the past:  Ibuprofen (still takes sometimes), Voltaren gel    · Has not tried: Muscle relaxants, TCAs, SNRIs, Lyrica, compound topical creams    Blood thinners: None    Interventional Therapies: [id number] injections by Dr. Iyer were helpful  · 5/8/2018- Left knee Synvisc One injection  · 7/10/2018- Left genicular nerve block  · 7/31/2018- Left genicular cooled RFA  · 12/21/2018- Left knee steroid injection  · 1/31/2019- Left knee Euflexxa series  · 3/19/2019 Left genicular cooled RFA- 90% relief  · 7/19/19- Left knee genicular phenol chemodenervation-no relief    Relevant Surgeries: None    Affecting sleep? No    Affecting daily activities? Yes    Depressive symptoms? No          · SI/HI? No    Work status: No, on disability due to her knee    Prescription Monitoring Program database:  Reviewed and consistent with medication use as prescribed.      Last 3 PDI Scores 10/4/2019 9/4/2019 8/8/2019   Pain Disability Index (PDI) 0 70 70   ]    GENERAL: +chills No weight loss, malaise or fevers.  HEENT:   No recent changes in vision or hearing  NECK:  Negative for lumps, no difficulty with swallowing.  RESPIRATORY:  Negative for cough, wheezing or shortness of breath, patient denies any recent URI.  CARDIOVASCULAR:  Negative for chest pain, leg swelling or palpitations.  GI:  Negative for abdominal discomfort, blood in stools or black stools or change in bowel habits.  MUSCULOSKELETAL:  See HPI.  SKIN:  Negative for lesions, rash, and itching.  PSYCH:  Negative for mood disorder or recent psychosocial stressors.    HEMATOLOGY/LYMPHOLOGY:  Negative for prolonged bleeding, bruising easily or swollen nodes.    ENDO: No history of diabetes or thyroid dysfunction  NEURO:   No history of headaches, syncope, paralysis, seizures or tremors.  All other reviewed and negative other than  HPI.          Past Medical History:   Diagnosis Date    Anemia     2018    Arthritis     BMI 60.0-69.9, adult     Cataract     Depression     Dry eye syndrome     GERD (gastroesophageal reflux disease)     Glaucoma suspect     History of gastric ulcer     Hyperlipidemia     Hypertension     Hypothyroidism     Morbid obesity     Neuromuscular disorder     Thyroid disease        Past Surgical History:   Procedure Laterality Date    APPENDECTOMY      CATARACT EXTRACTION W/  INTRAOCULAR LENS IMPLANT Bilateral     MFIOL OU    cataract surgery   2017    INJECTION OF ANESTHETIC AGENT AROUND NERVE Left 7/10/2018    Procedure: BLOCK, NERVE;  Surgeon: Samantha Vidal MD;  Location: [facility];  Service: Pain Management;  Laterality: Left;  Genicular     INJECTION OF ANESTHETIC AGENT AROUND NERVE Left 7/19/2019    Procedure: Block, Nerve NERVE BLOCK GENICULAR WITH PHENOL 6%;  Surgeon: Samantha Vidal MD;  Location: [facility];  Service: Pain Management;  Laterality: Left;  NEEDS CONSENT       Review of patient's allergies indicates:  No Known Allergies    Current Outpatient Medications   Medication Sig Dispense Refill    acetaminophen (TYLENOL) 500 MG tablet       amLODIPine (NORVASC) 5 MG tablet Take 10 mg by mouth once daily.       atorvastatin (LIPITOR) 40 MG tablet Take 40 mg by mouth once daily.       B-complex with vitamin C (Z-BEC OR EQUIV) tablet Take 1 tablet by mouth once daily.       CALCIUM CITRATE-VITAMIN D2 ORAL Take 1 tablet by mouth once daily.       ferrous sulfate 220 mg (44 mg iron)/5 mL solution Take 220 mg by mouth once daily.      gabapentin (NEURONTIN) 300 MG capsule Take 2 capsules (600 mg total) by mouth 2 (two) times daily. 120 capsule 4    lisinopril (PRINIVIL,ZESTRIL) 40 MG tablet Take 40 mg by mouth once daily.       multivitamin with minerals tablet Take 1 tablet by mouth once daily.       omega 3-dha-epa-fish oil 1,000 mg (120 mg-180 mg) Cap        oxyCODONE-acetaminophen (PERCOCET) 5-325 mg per tablet Take 1 tablet by mouth 2 (two) times daily as needed for Pain. 60 tablet 0    pantoprazole (PROTONIX) 40 MG tablet Take 40 mg by mouth 2 (two) times daily.       sucralfate (CARAFATE) 1 gram tablet Take 1 g by mouth 4 (four) times daily before meals and nightly.        No current facility-administered medications for this visit.        Family History   Problem Relation Age of Onset    No Known Problems Mother     No Known Problems Father        Social History     Socioeconomic History    Marital status: Single     Spouse name: Not on file    Number of children: Not on file    Years of education: Not on file    Highest education level: Not on file   Occupational History    Not on file   Social Needs    Financial resource strain: Not on file    Food insecurity:     Worry: Not on file     Inability: Not on file    Transportation needs:     Medical: Not on file     Non-medical: Not on file   Tobacco Use    Smoking status: Current Every Day Smoker     Packs/day: 0.25     Years: 50.00     Pack years: 12.50     Types: Cigarettes     Last attempt to quit: 11/15/2018     Years since quittin.8    Smokeless tobacco: Never Used   Substance and Sexual Activity    Alcohol use: Not Currently    Drug use: No    Sexual activity: Not Currently     Partners: Male   Lifestyle    Physical activity:     Days per week: Not on file     Minutes per session: Not on file    Stress: Not on file   Relationships    Social connections:     Talks on phone: Not on file     Gets together: Not on file     Attends Buddhism service: Not on file     Active member of club or organization: Not on file     Attends meetings of clubs or organizations: Not on file     Relationship status: Not on file   Other Topics Concern    Not on file   Social History Narrative    Not on file       Objective:     Vitals:    10/04/19 0903   BP: 133/75   Pulse: 77   Resp: 18   Temp: 97.2 °F  "(36.2 °C)   Weight: (!) 160.7 kg (354 lb 4.5 oz)   Height: 5' 6" (1.676 m)   PainSc: 0-No pain       GEN:  Well developed, well nourished.  No acute distress. No pain behavior.  HEENT:  No trauma.  Mucous membranes moist.  Nares patent bilaterally.  PSYCH: Normal affect. Thought content appropriate.  CHEST:  Breathing symmetric.  No audible wheezing.  ABD:   · Soft, non-distended.    SKIN:  Warm, pink, dry.  No rash on exposed areas.    EXT:  No erythema noted TTP over lateral aspect of knee, 5/5 stregnth in BLE muscles groups, 2+ reflexes bilaterally  No color change or changes in nail or hair growth.  NEURO/MUSCULOSKELETAL:  Fully alert, oriented, and appropriate. Speech normal francesca. No cranial nerve deficits.   Gait: in wheelchair, guarding of the LLE  Motor Strength: 5/5 motor strength throughout lower extremities.   Sensory: hyperesthesia mid left lateral thigh, small area of numbness left lateral knee. Negative straight leg raise bilaterally.     Left knee  Inspection:  No erythema or redness. No bruising.  ROM: Full ROM but with pain on flexion and extension.   Palpation: No pes anserine tenderness and positive crepitus. No patellar tendon tenderness and no patellofemoral tendon tenderness. Difficult to assess ligamentous laxity due to habitus.       Imaging:      MRI Left Knee 7/21/2017 (in media):  The medial meniscus is intact. The lateral meniscus is intact.     A chronic complete ACL tear is noted. The posterior cruciate ligament is intact. The medial and lateral retinacula are intact. The medial collateral ligament is intact. The lateral collateral ligament in intact. The posterolateral corner structures are intact.     There is full thickness fissuring of the central aspect medial femoral cartilage. The lateral tibiofemoral compartment cartilage is intact. There is broad thickness defect within the central trochlear cartilage. There is mild lateral patellofemoral cartilage thinning and regional full " thickness loss of the central superior patellar cartilage.     A small effusion is present. There is trace infrapatellar fluid. Tiny popliteal cyst is noted. Subtle focal marrow edema is seen within the posterior tibial plateau. The bone marrow signal is heterogeneous likely reflecting marrow reconversion.     There is trace pes anserine bursitis. The tendons are otherwise normal.     Grade 2 fatty streaking of the semimembranosus and vastus lateralis muscles are noted. There is mild prepatellar edema.     MRI Right Shoulder 7/21/2017 (in media):   There is a complete tear of the supraspinatus tendon with retraction to the glenohumeral joint. There is partial tearing of the anterior infraspinatus articular surface. Mild subscapularis tendinosis is noted. The teres minor is intact. A small amount of subacromial/subdeltoid fluid is noted. The proximal long head biceps tendon is poorly visualized proximally although intact distally.     Degenerative tearing of the anterosuperior through posterosuperior labrum is noted. There is moderate to severe superior glenoid cartilage thinning with subchondral degenerative changes. The glenohumeral ligaments appear grossly intact.     An os acromiale is seen with fluid and osteophytosis at its junction with the base of the acromion. There is moderate superior subluxation of the AC joint with mild osteophytosis.     Small effusion with subcoracoid extension is noted.     There is grade 2/3 fatty atrophy of the supraspinatus muscle, grade 2 of the infraspinatus and subscapularis. Heterogeneous signal is seen throughout the bone marrow likely reflecting marrow reconversion.       Assessment:     Encounter Diagnoses   Name Primary?    Primary osteoarthritis of left knee Yes    Chronic pain of left knee     Chronic pain disorder     Encounter for monitoring opioid maintenance therapy        Plan:     Ca was seen today for knee pain.    Diagnoses and all orders for this  visit:    Primary osteoarthritis of left knee    Chronic pain of left knee    Chronic pain disorder    Encounter for monitoring opioid maintenance therapy  -     Pain Clinic Drug Screen       Her pain is consistent with the above.    We discussed the assessment and recommendations.  All available images were reviewed. We discussed the disease process, prognosis, treatment plan, and risks and benefits. The patient is aware of the risks and benefits of the medications being prescribed, common side effects, and proper usage. The following is the plan we agreed on:     1. Unfortunately, knee pain has been refractory to interventional procedures. At this time, we have exhausted all options for interventional pain management.  2. Patient has tolerated opioids in the past and notes improved function and quality of life while taking opioids for pain for debilitating knee pain. She is a candidate for palliative care at this time.   3. Continue Percocet 5/325 mg BID PRN, #60. Refill sent today. She may call for 2 refills.   4. The patient is here today for a refill of current pain medications and they believe these provide effective pain control and improvements in quality of life.  The patient notes no serious side effects, and feels the benefits outweigh the risks.  The patient was reminded of the pain contract that they signed previously as well as the risks and benefits of the medication including possible death.  The updated [place] Board [employer] prescription monitoring program was reviewed, and the patient has been found to be compliant with current treatment plan. Medication management provided by Dr. Vidal.  5. Pain Contract signed 9/4/2019.   6. UDS done today.   7. Continue Gabapentin 600 mg BID.   8. Patient does have some paresthesias in left knee which are expected with phenol chemodenervation.   9. Recommended patient followup with bariatric surgery clinic.   10. RTC in 3 months.    - Dr. Vidal was  consulted on the patient and agrees with this plan.    The above plan and management options were discussed at length with patient. Patient is in agreement with the above and verbalized understanding.     Savanna Hyatt NP  10/04/2019

## 2019-10-04 NOTE — PROGRESS NOTES
"Patient was given vaccine information sheet for the Flu Vaccine. The area of injection was palpated using the acromion process as a landmark. This area was cleaned with alcohol. Using a 25g 1" safety needle, 0.5mL of the vaccine was placed into the left deltoid muscle. The injection site was dressed with a bandage. Patient experienced no complications and was discharged in stable condition. Fluzone High Dose vaccine Lot: [id number] Exp: 04/18/2020.    "

## 2019-10-10 ENCOUNTER — TELEPHONE (OUTPATIENT)
Dept: BARIATRICS | Facility: CLINIC | Age: 69
End: 2019-10-10

## 2019-10-10 NOTE — TELEPHONE ENCOUNTER
Attempted to reach patient to notify her of bariatric hold until she stops smoking and sees Psychologist, Dr. Funes.  No answer.  Left VM on only number listed in which I could leave VM.  Unable to send message through [facility] portal as it was declined.  Letter with further instructions mailed to patient.

## 2019-10-10 NOTE — LETTER
[facility] - Bariatric Surgery  [address]  [place] [address]  Phone: [phone]  Fax: [phone] October 10, 2019    Ca Coats  [address]  [place] [address]    Re: Ca Coats        : 1950        MRN: [id number]    Dear Ms. Coats:    Thank you for your interest in surgical weight loss at [facility]. Since your visit, our multidisciplinary team has conducted a careful review of your diagnostic testing and evaluations to determine if you are likely to be a successful surgical weight loss candidate at [facility].     After careful consideration of several factors, in order to proceed with your workup for bariatric surgery, you will need to stop smoking and schedule an appointment with Dr. Funes, the bariatric psychologist here at [facility].  You can call [phone] to schedule your psychology appointment.  Please call me at [phone] to discuss further.      Sincerely,        GLEN Tucker MD   - [facility]  Section Head of Bariatric, Laparoscopic, General,  Acute Care and Oncologic Surgery  [facility]

## 2019-10-11 ENCOUNTER — TELEPHONE (OUTPATIENT)
Dept: BARIATRICS | Facility: CLINIC | Age: 69
End: 2019-10-11

## 2019-10-11 LAB
6MAM UR QL: NOT DETECTED
7AMINOCLONAZEPAM UR QL: NOT DETECTED
A-OH ALPRAZ UR QL: NOT DETECTED
ALPRAZ UR QL: NOT DETECTED
AMPHET UR QL SCN: NOT DETECTED
BARBITURATES UR QL: NOT DETECTED
BUPRENORPHINE UR QL: NOT DETECTED
BZE UR QL: NOT DETECTED
CARBOXYTHC UR QL: NOT DETECTED
CARISOPRODOL UR QL: NOT DETECTED
CLONAZEPAM UR QL: NOT DETECTED
CODEINE UR QL: NOT DETECTED
CREAT UR-MCNC: 102 MG/DL (ref 20–400)
DIAZEPAM UR QL: NOT DETECTED
ETHYL GLUCURONIDE UR QL: NOT DETECTED
FENTANYL UR QL: NOT DETECTED
HYDROCODONE UR QL: NOT DETECTED
HYDROMORPHONE UR QL: NOT DETECTED
LORAZEPAM UR QL: NOT DETECTED
MDA UR QL: NOT DETECTED
MDEA UR QL: NOT DETECTED
MDMA UR QL: NOT DETECTED
ME-PHENIDATE UR QL: NOT DETECTED
MEPERIDINE UR QL: NOT DETECTED
METHADONE UR QL: NOT DETECTED
METHAMPHET UR QL: NOT DETECTED
MIDAZOLAM UR QL SCN: NOT DETECTED
MORPHINE UR QL: NOT DETECTED
NORBUPRENORPHINE UR QL CFM: NOT DETECTED
NORDIAZEPAM UR QL: NOT DETECTED
NORFENTANYL UR QL: NOT DETECTED
NORHYDROCODONE UR QL CFM: NOT DETECTED
NOROXYCODONE UR QL CFM: PRESENT
NOROXYMORPHONE: NOT DETECTED
OXAZEPAM UR QL: NOT DETECTED
OXYCODONE UR QL: PRESENT
OXYMORPHONE UR QL: NOT DETECTED
PATHOLOGY STUDY: NORMAL
PCP UR QL: NOT DETECTED
PHENTERMINE UR QL: NOT DETECTED
PROPOXYPH UR QL: NOT DETECTED
SERVICE CMNT-IMP: NORMAL
TAPENTADOL UR QL SCN: NOT DETECTED
TAPENTADOL-O-SULF: NOT DETECTED
TEMAZEPAM UR QL: NOT DETECTED
TRAMADOL UR QL: NOT DETECTED
ZOLPIDEM UR QL: NOT DETECTED

## 2019-10-11 NOTE — TELEPHONE ENCOUNTER
Called and not able to leave msg- rang continously.  Called to discuss need to quit smoking and see psych before continuing with nutrition visits.  Will call again.

## 2019-10-15 ENCOUNTER — TELEPHONE (OUTPATIENT)
Dept: BARIATRICS | Facility: CLINIC | Age: 69
End: 2019-10-15

## 2019-10-16 ENCOUNTER — TELEPHONE (OUTPATIENT)
Dept: SMOKING CESSATION | Facility: CLINIC | Age: 69
End: 2019-10-16

## 2019-10-16 NOTE — TELEPHONE ENCOUNTER
2nd attempt regarding smoking cessation quit 2 episode, unable to leave message due to no answering service available.

## 2019-10-17 ENCOUNTER — TELEPHONE (OUTPATIENT)
Dept: BARIATRICS | Facility: CLINIC | Age: 69
End: 2019-10-17

## 2019-10-17 NOTE — TELEPHONE ENCOUNTER
----- Message from Carola Sandoval sent at 10/10/2019  7:48 AM CDT -----  Regarding: 10/22 [facility] appt.  Hey,    This patient has been put on hold  By selection until she   1. Stop smoking  2. Has to see [place]    Selection notes are on snap shot.    Dr. Mata said no more appt for us until she meets those requirments.

## 2019-10-17 NOTE — TELEPHONE ENCOUNTER
Called and left Vm for pt to return phone call----- Message from Catherine Rose sent at 10/17/2019 11:56 AM CDT -----  Contact: pt  Reason:Pt returning call     Communication: [phone]

## 2019-10-22 ENCOUNTER — CLINICAL SUPPORT (OUTPATIENT)
Dept: BARIATRICS | Facility: CLINIC | Age: 69
End: 2019-10-22
Payer: MEDICARE

## 2019-10-22 VITALS — WEIGHT: 293 LBS | BODY MASS INDEX: 59.25 KG/M2

## 2019-10-22 DIAGNOSIS — I10 ESSENTIAL HYPERTENSION: ICD-10-CM

## 2019-10-22 DIAGNOSIS — G47.33 OBSTRUCTIVE SLEEP APNEA: ICD-10-CM

## 2019-10-22 DIAGNOSIS — E66.01 MORBID OBESITY WITH BODY MASS INDEX OF 50 OR HIGHER: ICD-10-CM

## 2019-10-22 DIAGNOSIS — Z71.3 NUTRITIONAL COUNSELING: ICD-10-CM

## 2019-10-22 PROCEDURE — 97803 MED NUTRITION INDIV SUBSEQ: CPT | Mod: PBBFAC | Performed by: DIETITIAN, REGISTERED

## 2019-10-22 PROCEDURE — 99499 NO LOS: ICD-10-PCS | Mod: S$PBB,,, | Performed by: DIETITIAN, REGISTERED

## 2019-10-22 PROCEDURE — 99499 UNLISTED E&M SERVICE: CPT | Mod: S$PBB,,, | Performed by: DIETITIAN, REGISTERED

## 2019-10-22 NOTE — PROGRESS NOTES
NUTRITION NOTE    Referring Physician: Irina Cleveland MD  Reason for MNT Referral: Nutritional follow up pending  Gastric Bypass    Patient presents for 5th visit for MSD with weight loss over the past month; total weight loss by making numerous dietary and lifestyle changes.  Needs to get below 360 lbs    CLINICAL DATA:  68 y.o. female.    Current Weight: 367 lbs  Weight Change Since Initial Visit: 0 lbs  Ideal Body Weight: 141  lbs  Body mass index is 59.25 kg/m².    Last Weight: 370 lbs        DAILY NUTRITIONAL NEEDS:  2233 x1.2 activity factor = 2680 - 1000 for wt loss = 1680 Calories (using [place] [address] Jeor Equation)   Grams Protein (1.5-1.8 gm/kg IBW)      CURRENT DIET:  Reduced-calorie diet.  Diet Recall: Food records are not present.  Breakfast: Eggs 2-3, coffee ff creamer sweetnlow  or 2%miilk  Lunch: Pork chop salad  Dinner: Mushroom salad with chicken  Snack: Protein bar pure protein before bed    Diet Includes: 3 meals and 1 snack  Meal Pattern: Improved.  Protein Supplements: 0 per day.  Snacking: Adequate. Snacks include healthy choices.  Vegetables: Likes a variety. Eats almost daily.  Fruits: Likes a few. Eats almost daily. banana  Beverages: soda, coffee without sugar and 2% milk  Dining Out:  Never. Mostly none.  Cooking at home: Daily. Mostly baked meat and vegetables.    CURRENT EXERCISE:  Adequate  PT almost every day    Vitamins / Minerals / Herbs:   B complex with vitamin C  Iron   Multivitamin    Food Allergies:   None reported    Social:  Disabled.  Alcohol: None.  Smoking: Less than 2 packs per week.    ASSESSMENT:  Patient demonstrates some willingness to change lifestyle habits as evidenced by weight loss, good exercise, dietary changes, increased vegetables and healthier snacking at home.    Doing fairly well with working on greatest challenges (starchy CHO).    Barriers to Education:  none  Stage of Change:  action    NUTRITION DIAGNOSIS:  Obesity related to Inadequate  protein intake and Physical inactivity as evidence by BMI.  Status: Improved    PLAN:  Pt is potential  candidate for surgery.    Diet: Adjust diet plan.  Work on Bariatric Nutrition Checklist.  Begin trying various protein supplements to determine preference.  5-6 meals per day.    Exercise: Maintain.    Behavior Modification: Begin to document food & activity logs daily.    Pt is hold due to continuing to smoke. Pt has appts with psych.  After quitting smoking and being cleared by  psych pt may continue work up    Communicated nutrition plan with bariatric team.    SESSION TIME:  30 minutes

## 2019-11-04 ENCOUNTER — OFFICE VISIT (OUTPATIENT)
Dept: PSYCHIATRY | Facility: CLINIC | Age: 69
End: 2019-11-04
Payer: MEDICARE

## 2019-11-04 DIAGNOSIS — Z01.818 PREOPERATIVE EVALUATION TO RULE OUT SURGICAL CONTRAINDICATION: Primary | ICD-10-CM

## 2019-11-04 DIAGNOSIS — E66.01 MORBID OBESITY DUE TO EXCESS CALORIES: ICD-10-CM

## 2019-11-04 DIAGNOSIS — G47.33 OSA (OBSTRUCTIVE SLEEP APNEA): ICD-10-CM

## 2019-11-04 PROCEDURE — 90791 PSYCH DIAGNOSTIC EVALUATION: CPT | Mod: ,,, | Performed by: PSYCHOLOGIST

## 2019-11-04 PROCEDURE — 96139 PSYCL/NRPSYC TST TECH EA: CPT | Mod: ,,, | Performed by: PSYCHOLOGIST

## 2019-11-04 PROCEDURE — 96131 PSYCL TST EVAL PHYS/QHP EA: CPT | Mod: ,,, | Performed by: PSYCHOLOGIST

## 2019-11-04 PROCEDURE — 90791 PR PSYCHIATRIC DIAGNOSTIC EVALUATION: ICD-10-PCS | Mod: ,,, | Performed by: PSYCHOLOGIST

## 2019-11-04 PROCEDURE — 96130 PSYCL TST EVAL PHYS/QHP 1ST: CPT | Mod: ,,, | Performed by: PSYCHOLOGIST

## 2019-11-04 PROCEDURE — 96139 PR PSYCH/NEUROPSYCH TEST ADMIN/SCORING, BY TECH, 2+ TESTS, EA ADDTL 30 MIN: ICD-10-PCS | Mod: ,,, | Performed by: PSYCHOLOGIST

## 2019-11-04 PROCEDURE — 96131 PR PSYCHOLOGIC TEST EVAL SVCS, EA ADDTL HR: ICD-10-PCS | Mod: ,,, | Performed by: PSYCHOLOGIST

## 2019-11-04 PROCEDURE — 96138 PSYCL/NRPSYC TECH 1ST: CPT | Mod: ,,, | Performed by: PSYCHOLOGIST

## 2019-11-04 PROCEDURE — 96138 PR PSYCH/NEUROPSYCH TEST ADMIN/SCORING, BY TECH, 2+ TESTS, 1ST 30 MIN: ICD-10-PCS | Mod: ,,, | Performed by: PSYCHOLOGIST

## 2019-11-04 PROCEDURE — 96130 PR PSYCHOLOGIC TEST EVAL SVCS, 1ST HR: ICD-10-PCS | Mod: ,,, | Performed by: PSYCHOLOGIST

## 2019-11-04 NOTE — PROGRESS NOTES
"Psychiatry Initial Visit (PhD/LCSW)   Diagnostic Interview - CPT 23586     Date: 11/04/2019    [facility]     Referral source: Irina Cleveland M.D.    Clinical status of patient: Outpatient     Ca Coats, a 68 y.o. female, presented for initial evaluation visit. Before this evaluation was initiated, the purposes and process of the assessment and the limits of confidentiality were discussed with the patient who expressed understanding of these issues and orally consented to proceed with the evaluation.     Chief complaint/reason for encounter: Routine psychological evaluation prior to bariatric surgery.     Type of surgery sought: She is undecided.    History of present illness: Ms. Coats is a 68-year-old self-identified Creole female who is pursuing bariatric surgery to improve her health and quality of life. She has no history of significant psychological difficulties. She has never taken psychotropic medication and denied any history of suicidality. She was psychiatrically hospitalized once in 2015, but reports that it was based on a "misunderstanding" (see more below). She has begun making positive lifestyle changes in anticipation for surgery, with minimal benefit. She has a Body Mass Index of 61 as documented by the referring provider.    Ms. Coats reports that has struggled with weight for the past 8 years. She states that as her health has declined, she has had a much harder time being active as she was in former years and she believes that this has led to weight gain. Regarding unhealthy eating, Ms. Coats reports that she "doesn't eat that much". She typically skips breakfast and lunch, only eating in the evening. When she does eat, it is often a sandwich. She also has cravings for sweets at night so she will eat candy prior to bed and sometimes in the middle of the night if she wakes up. For the past 3 months she has lived in a nursing home to rehab her knee, and she reports " "that they are unable/unwilling to help with a more flexible and healthy diet plan so she has been unable to eat healthier foods. Ms. Coats does not consider herself to be an emotional eater; she does smoke cigarettes and describes smoking as her negative stress coping response. She has tried weight loss methods in the past, including Atkins as well as a diet where she ate 4 meals per day. She reports that she has been able to lose some weight on diets, but that she always regains it "plus more". She become discouraged on diets due to poor weight loss. Her motivation for seeking surgery now is to improve her mobility and thereby her quality of life. She currently is mostly in a wheelchair, which has inhibited her from engaging in activities that she loves. 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      Ms. Coats has met with Ms. Edel Nix RD, bariatric dietician, and reports that she has made the following lifestyle changes since beginning the bariatric program: "I have a fridge full of protein shakes in my apartment, but until I move back there from the nursing home there is not much I can do because they won't help me". She has met monthly with a dietician for 6 months and reports good knowledge of the necessary bariatric diet.     Co-morbidities: HTN, HLD, LAURA     Knowledge of surgery information:  - Basics of procedure: Y - "they will make my stomach smaller"  - Risks: "I'm not sure, but I know that it might not work, especially if I don't follow the diet"  - Basics of diet: Y - "eat protein and vegetables, stay away from sugar and salt"    Pain: 0/10    Psychiatric Symptoms:   Depression - denied significant symptoms of depression. She does not believe that she has ever experienced clinical depression before, but she acknowledges that she gets "irritated quickly" and can be "very " "emotional" if something negative happens, which others interpret as depression. From her perspective, however, she states that she discharges her feelings and returns to baseline quickly.  Leigh Ann/Hypomania - denied significant symptoms of leigh ann/hypomania.  Anxiety - denied significant symptoms of anxiety.   Thoughts - denied delusions, hallucinations.  Suicidal thoughts/behaviors - denied.  Substance abuse - continues to smoke cigarettes.  Sleep -  7-8 hours/night.  Self-injury - denied.    Current psychiatric treatment:  Medications: None.    Psychotherapy: None.    Treating clinicians: N/A    Health behaviors: Reported adherence to pharmacologic regimen.      Psychiatric history:   Previous diagnosis: Ms. Coats denies any history of psychiatric symptoms. She describes two situations in the past several years where she was sent for an emergency psychiatric evaluation, which she believes were "misunderstandings".  1) In , Ms. Coats suddenly got the news that she was being evicted from her apartment of 27 years, and then 2 days after found out that one of her closest friends . She states that she was very distraught and started drinking heavily to deal with her sadness. She went to a doctor for an appointment and told him that she "can't take it anymore" when describing her emotional experience while crying heavily, and she reports that she was taken to the ED and then transferred to a psychiatric facility in [place], where she stayed for 10 days. She is adamant that she was not suicidal and did not need any treatment, and that the doctor misunderstood what she meant; however, she was kept that long allegedly due to the facility being unable to contact and verify psych history with a doctor.    2) In , Ms. Coats was denied bariatric surgery "last minute" by [facility] surgeon, who saw nicotine on her lab work the day of her surgery. She reports that she was not smoking but the nicotine " "was due to her patch for quitting but the doctor still denied her the surgery. She was again distraught and stated that she would "rather die than have to live with the knee pain" and that she "couldn't take it anymore" while she was crying. She was sent to the ED for an evaluation by a psychiatrist, whom she stated released her right away. She denied any intent to hurt or kill herself.    Previous hospitalizations: See above. She was hospitalized once in 2015, which she describes as a "misunderstanding".     History of outpatient treatment: Denies.    Previous suicide attempt: Denies. "I would never do that to my children, and it is against my Confucianism".      Trauma history:  Denies.      History of eating disorders:  History of bulimia: Denies.    History of binge-eating episodes: Denies.      Family history of psychiatric illness: None reported.     Social history (marriage, employment, etc.): Ms. Coats was born [place] in [place] by her biological parents. She considers her ethnicity to be Creole and reports that her first language is also Creole. She has 10 siblings. Ms. Coats describes a "happy" childhood and denies a history of abuse or trauma. Ms. Coats completed formal schooling at the age of 16, which she states was commonplace in her country at the time. 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 Since that time Ms. 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 Ms. Coats is single and has a history of one brief marriage 40 years ago ("I learned that my  was  to 2 other women in [place] so I left him"). She has 2 children, ages 50 " and 40. She rents in an apartment in the [place], but has been living temporarily in a nursing home (through [facility]) for the past 3 months to rehab her knee since she has been unable to care well for herself. She reports to be very unhappy at this facility and is hoping to move home in the next few weeks, with the help of an aide who will be there 4 hours per day. Ms. Coats identifies as Anabaptism.    Current psychosocial stressors: Living in the nursing home is her biggest stressor, as she reports that they have been overly restrictive with her. She is also stressed by her inability to do the things she loves to do, like walk around her neighborhood, work at the [employer], and generally take better care of herself.    Report of coping skills: Ms. Coats acknowledges that smoking has been her primary coping mechanism. However, she does report to have others, including walking the park (when she is healthy enough to do so), prayer and attending [employer], doing crossword puzzles, and sewing.     Support system: She describes her daughter as very supportive and states that she visits every other day. She also is supported by her friends at [employer].    Substance use:   Alcohol: Denied current use. Ms. Coats reports that she quit drinking 15 years ago due to feeling dependent on alcohol. However, she had a 3 week long relapse in 2015, when she was drinking nightly to go to sleep while depressed and anxious, just prior to being placed in the psychiatric hospital. She reports no alcohol use in 4 years.  Drugs: Denied current use; denied history of abuse or dependency.  Tobacco: Currently smoking 5-6 cigarettes per day. She reports to have quit completely through a smoking cessation program for 18 months, but states she started smoking again 3 months ago when she moved into this facility and became stressed. She is aware of the need to quit prior to surgery.   Caffeine: 3-4 cups of coffee per  day.    Current medications and drug reactions (include OTC, herbal): see medication list     Strengths and liabilities: Strength: Patient accepts guidance/feedback, Strength: Patient is expressive/articulate., Strength: Patient has positive support network., Strength: Patient has reasonable judgment., Strength: Patient is stable.     Current Evaluation:    Mental Status Exam:   General Appearance:  age appropriate, well dressed, neatly groomed, overweight    Speech:  normal tone, normal rate, normal pitch, normal volume    Level of Cooperation:  cooperative    Thought Processes:  normal and logical    Mood:  euthymic    Thought Content:  normal, no suicidality, no homicidality, delusions, or paranoia    Affect:  congruent and appropriate    Orientation:  oriented x3    Memory:  recent memory intact; immediate and delayed word recall 3/3  remote memory intact; able to recall remote personal events   Attention Span & Concentration:  Unable to spell WORLD forward or backward  - likely due to ESL rather than attention issues   Fund of General Knowledge:  appropriate for education    Abstract Reasoning:  interpretation of proverbs was abstract; interpretation of similarities was abstract   Judgment & Insight:  good    Language  intact - English as a second language       Diagnostic Impression - Plan:      Diagnostic Impression:  Z01.818 Pre-operative examination   E66.01   Morbid obesity  I10          Hypertension  G47.33   Obstructive Sleep Apnea  E78.5      Hyperlipidemia  Z68.44    Body Mass Index of 61    Summary/Conclusion:   There are no overt psychological contraindications for proceeding with bariatric surgery. Ms. Coats has no significant psychiatric history, and reports no current psychiatric problems. However, the following concerns are noted:  1) Ms. Coats continues to smoke cigarettes and has demonstrated difficulty quitting in the past. This appears to be related to lack of engagement in other coping  skills, which we discussed in detail today. Ms. Coats was encouraged to contact the bariatric program to clarify the requirement for quitting if she has questions. She reports that her schedule was cancelled at [facility] last year because she still had nicotine in her system, but claims that this was due to use of patches rather than cigarettes.    2) Ms. Coats does not have a history of psychiatric problems yet she has ended up in emergency evaluations with psychiatrists twice due to very strong negative reactions when something does not go her way. This does not appear due to psychiatric issues, but rather issues related to her culture, personality, and problems with immediate emotional regulation. The team should be aware that Ms. Coats may react strongly to news that she does not like or is unexpected in this process.    Recommendations:  -This patient is psychologically cleared to proceed with bariatric surgery, pending her ability to quit smoking.  -It was recommended that she work on enhancing her healthy coping skills as she work on smoking cessation.   -No psychological treatment is required at this time.    Please see Psychological Testing report available in Notes tab of Chart Review in Epic for results of psychological testing.

## 2019-11-04 NOTE — Clinical Note
She is cleared, though she continues to smoke cigarettes. Also, some specific concerns were noted in her report - please read and let me know if there are questions.

## 2019-11-05 NOTE — PSYCH TESTING
"[facility] [address] [place]  [address]  [phone]    REPORT OF PSYCHOLOGICAL TESTING    NAME: Ca Coats  OC #: [id number]  : 1950    REFERRED BY: Irina Cleveland M.D.      EVALUATED BY:  Jade Funes, Ph.D., Clinical Psychologist  ADA Stubbs, Psychometrician    DATES OF EVALUATION: 10/25/2019, 2019    EVALUATION PROCEDURES AND TIMES:  Conducted by Psychologist (2 hours):  Integration of patient data, interpretation of standardized test results and clinical data, clinical decision-making, treatment planning and report, and interactive feedback to the patient  CPT Codes:  00011 - 1 hour; 04812 - 1 hour  Conducted by Technician (2 hours, 15 minutes):  Psychological test administration and scoring by technician, two or more tests, any method:  [place] Multiphasic Personality Inventory - 2 - Restructured Form (MMPI-2-RF); Garcia Depression Inventory - II (BDI-II); [facility] Medicine Diagnostic (MBMD)  CPT Codes:  94168 - 30 minutes; 82611 - 30 minutes, 61179 - 30 minutes, 70745 - 30 minutes (3 additional units)    EVALUATION FINDINGS:  Before this evaluation was initiated, the purposes and process of the assessment and the limits of confidentiality were discussed with the patient who expressed understanding of these issues and orally consented to proceed with the evaluation.    Ms. Coats reports that has struggled with weight for the past 8 years. She states that as her health has declined, she has had a much harder time being active as she was in former years and she believes that this has led to weight gain. Regarding unhealthy eating, Ms. Coats reports that she "doesn't eat that much". She typically skips breakfast and lunch, only eating in the evening. When she does eat, it is often a sandwich. She also has cravings for sweets at night so she will eat candy prior to bed and sometimes in the middle of the night if she wakes up. For the " "past 3 months she has lived in a nursing home to rehab her knee, and she reports that they are unable/unwilling to help with a more flexible and healthy diet plan so she has been unable to eat healthier foods. Ms. Coats does not consider herself to be an emotional eater; she does smoke cigarettes and describes smoking as her negative stress coping response. She has tried weight loss methods in the past, including Atkins as well as a diet where she ate 4 meals per day. She reports that she has been able to lose some weight on diets, but that she always regains it "plus more". She become discouraged on diets due to poor weight loss. Her motivation for seeking surgery now is to improve her mobility and thereby her quality of life. She currently is mostly in a wheelchair, which has inhibited her from engaging in activities that she loves. 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    Ms. Coats does not have a history of major psychiatric illness. She denies any current psychiatric symptoms or problems in functioning. She was psychiatrically hospitalized once in 2015 due to a misunderstanding where a doctor believed she was suicidal when she was expressing grief and anxiety (I cant take this anymore) after two major stressors. She denies a history of eating disorder.    At her initial consultation with Ms. Byron Bolden on 04/29/2019, her BMI was 61. Her medical comorbidities include: HTN, HLD, LAURA. She has met with a bariatric dietician, and reports to have made small lifestyle changes since these meetings. She was aware of details regarding the sleeve procedure, as well as risks of the procedures and post-operative eating restrictions. She appears somewhat motivated for change at this time, but it is difficult to gauge due to her living in a facility where she has limited flexibility in terms of " her eating. She was fully cooperative and engaged in the assessment process.    Ms. Coats produced an INVALID MMPI-2RF protocol. The report is considered uninterpretable due to inconsistent responding; she demonstrated fixed True responding to items. Due to her limited formal education and English being her second language, she will not be asked to take the test again as these factors likely impeded her ability to obtain a valid protocol.     The MBMD indicated that Ms. Coats is not reporting any significant psychiatric symptoms at this time. Her profile indicates that she is quite guarded and suspicious of others, though she is unlikely to vocalize this. Her behavior is typified by a quiet, calm, and relatively passive manner of relating to others. She is highly sensitive to pain, so it is likely that progress with traditional medical procedures for pain will be less favorable than average. She does not endorse any of the typical coping styles measured by this assessment.  Test results suggest that psychological factors are unlikely to contribute to excessive complications for this patient. Test results reveal that, compared to other bariatric surgery patients, there is an average chance that she will engage in healthy lifestyle changes to support good surgery results, and a good chance of improved quality of life after surgery.     DIAGNOSTIC IMPRESSIONS:  Z68.44   Body Mass Index of 61  Z01.818 Pre-operative Exam  E66.01   Morbid Obesity    SUMMARY AND RECOMMENDATIONS:  Ms. Coats has a long history of weight problems and is pursuing bariatric surgery at this time in an effort to improve her health and quality of life. Results of MMPI are invalid, likely due to education and language issues, but [facility] results are valid and interpretable. Test results do not reveal any evidence that psychological difficulties would play a role in her recovery from surgery, but do indicate that she lacks some of the strong  coping mechanisms for stress that others endorse. A bariatric support group may help her with adjustment to surgery and improvement of these coping skills. No overt psychological contraindications were revealed by psychological testing.

## 2019-11-13 ENCOUNTER — TELEPHONE (OUTPATIENT)
Dept: PAIN MEDICINE | Facility: CLINIC | Age: 69
End: 2019-11-13

## 2019-11-13 DIAGNOSIS — M25.562 CHRONIC PAIN OF LEFT KNEE: ICD-10-CM

## 2019-11-13 DIAGNOSIS — G89.29 CHRONIC PAIN OF LEFT KNEE: ICD-10-CM

## 2019-11-13 DIAGNOSIS — M17.12 PRIMARY OSTEOARTHRITIS OF LEFT KNEE: ICD-10-CM

## 2019-11-13 DIAGNOSIS — G89.4 CHRONIC PAIN DISORDER: Primary | ICD-10-CM

## 2019-11-13 RX ORDER — OXYCODONE AND ACETAMINOPHEN 5; 325 MG/1; MG/1
1 TABLET ORAL 2 TIMES DAILY
Qty: 60 TABLET | Refills: 0 | Status: SHIPPED | OUTPATIENT
Start: 2019-11-13 | End: 2019-12-11 | Stop reason: SDUPTHER

## 2019-11-13 NOTE — TELEPHONE ENCOUNTER
Staff returned call to pt     Pt states she is moving from living facility,whom filled her prescription while she was living there and she would like her new prescription refill to be sent to [facility] [address] from now on    Pt states she is due for a refill for Percocet     Staff informed pt that once prescription refill is approved and sent pharmacy, we will notify her    Pt gave verbal understanding

## 2019-11-13 NOTE — TELEPHONE ENCOUNTER
----- Message from Dorcas Estrada sent at 11/13/2019 12:44 PM CST -----  Contact: pt  Name of Who is Calling: .Ca Coats      What is the request in detail: pt states that she would like to speak with staff in regards to her prescriptions. Please contact to further discuss and advise.      Can the clinic reply by [facility]: N       What Number [facility] Back [facility]: [phone]

## 2019-11-13 NOTE — TELEPHONE ENCOUNTER
----- Message from Jorge Jackson sent at 11/13/2019  4:51 PM CST -----  Contact: Anabelle Carreon)  Name of Who is Calling: Anabelle Carreon)      What is the request in detail: Would like to speak with staff in regards to diagnosis for PERCOCET. Please advise      Can the clinic reply by [facility]: no      What Number to [facility] Back [facility]:  [phone]

## 2019-11-13 NOTE — TELEPHONE ENCOUNTER
Contacted pharmacy, no automated service, tried to transfer and left message.Chronic pain disorder [G89.4]  Primary osteoarthritis of left knee [M17.12]  Chronic pain of left knee [M25.562, G89.29]

## 2019-11-14 ENCOUNTER — TELEPHONE (OUTPATIENT)
Dept: PAIN MEDICINE | Facility: CLINIC | Age: 69
End: 2019-11-14

## 2019-11-14 NOTE — TELEPHONE ENCOUNTER
----- Message from Kallie Pickens, Patient Care Assistant sent at 11/13/2019  4:51 PM CST -----  Contact: LU RENAE [[id number]]  Type:  Patient Returning Call    Who Called: LU RENAE [[id number]]    Who Left Message for Patient: Patient didn't know    Does the patient know what this is regarding?:No    Best Call Back Number:[phone]    Additional Information:  None

## 2019-11-14 NOTE — TELEPHONE ENCOUNTER
----- Message from Barbara Huggins sent at 11/14/2019 11:48 AM CST -----  Contact: Dunia Scott  Name of Who is Calling: Dunia Scott    What is the request in details: states per pt insurance provider they are requesting a call for prior authorization for the patient pain Rx to be approve.  Please contact to further discuss and advise      Can the clinic reply by [facility]: no    What Number to Call Back [facility]: [phone]

## 2019-11-14 NOTE — TELEPHONE ENCOUNTER
Prior authorization for percocet done approved via [employer] at this time , # [id number] from 10/15/2019-11-13/2020

## 2019-11-14 NOTE — TELEPHONE ENCOUNTER
Staff contacted and spoke with Dunia from [facility] she stated that the patient hasn't had her Rx filled since July and the insurance carrier is requesting a PA. Staff informed Dunia that the patient has two addresses listed on her  and one did have a July date listed for a refill. Dunia stated that this is the insurance carrier requirement.

## 2019-11-18 ENCOUNTER — TELEPHONE (OUTPATIENT)
Dept: BARIATRICS | Facility: CLINIC | Age: 69
End: 2019-11-18

## 2019-11-18 NOTE — TELEPHONE ENCOUNTER
Patient stated that she was in a nursing facility and just was released last Wednesday, 11/13.  She stated that while in there that she was smoking because it was terrible.  She is now home and smoking 2 cigarettes/day. Informed patient that she would need to be nicotine free ( including vaping, gum and patches) for 30 days then call us at which time, we would order a nicotine blood test, schedule nicotine test and appt to see dietitian.  Stressed importance of no smoking 30 days before surgery and for life after surgery due to the risks of smoking/nicotine.  Also informed patient that she would need to continue to adhere to the bariatric diet and not have weight gain in order to be cleared from nutrition and the team.  Patient verbalized understanding.  Patient to call when nicotine free; in the interim, patient placed on hold.  Patient requested an appt to get a new PCP because she needs refills on some of her medications.  appt scheduled and slip mailed as requested.

## 2019-11-20 ENCOUNTER — CLINICAL SUPPORT (OUTPATIENT)
Dept: SMOKING CESSATION | Facility: CLINIC | Age: 69
End: 2019-11-20
Payer: COMMERCIAL

## 2019-11-20 DIAGNOSIS — F17.200 NICOTINE DEPENDENCE: Primary | ICD-10-CM

## 2019-11-20 PROCEDURE — 99407 BEHAV CHNG SMOKING > 10 MIN: CPT | Mod: S$GLB,,,

## 2019-11-20 PROCEDURE — 99407 PR TOBACCO USE CESSATION INTENSIVE >10 MINUTES: ICD-10-PCS | Mod: S$GLB,,,

## 2019-11-20 NOTE — PROGRESS NOTES
Called pt to f/u on her 12 month smoking cessation quit status. Pt stated she relapsed and is back to smoking 5-6 cpd. Informed her she has benefits available and is able to rejoin. Pt scheduled appointment for quit #3 on 12/10/19. Informed her of benefit period, phone follow ups, and contact information. Will complete smart form and resolve quit #2 episode and will continue to follow up on new quit episode.

## 2019-12-09 NOTE — PROGRESS NOTES
Subjective:       Patient ID: Ca Coats is a 68 y.o. female who presents today to establish care.    Chief Complaint: Establish Care    HPI   Patient overall stable today.  Here to establish care - she is transferring her care from [facility] after not liking Bariatric Surgery there.  She uses a wheelchair and cane for mobility.    Patient denies f/c, n/v/d.  No chest pain or SOB.  No abdominal pain or dysuria.  No headaches or change in vision.  No dizziness.  No significant  weight gain or weight loss.  Remaining ROS negative.    Review of Systems   Constitutional: Negative for appetite change, chills, diaphoresis, fatigue, fever and unexpected weight change.   HENT: Negative for ear pain, hearing loss, sinus pain, tinnitus, trouble swallowing and voice change.    Eyes: Negative for photophobia, pain and visual disturbance.   Respiratory: Negative for chest tightness, shortness of breath and wheezing.    Cardiovascular: Negative for chest pain, palpitations and leg swelling.   Gastrointestinal: Negative for abdominal pain, blood in stool, constipation, diarrhea, nausea and vomiting.   Endocrine: Negative for cold intolerance, heat intolerance, polydipsia, polyphagia and polyuria.   Genitourinary: Negative for decreased urine volume, difficulty urinating, dysuria, flank pain, hematuria, pelvic pain, vaginal bleeding, vaginal discharge and vaginal pain.   Musculoskeletal: Positive for arthralgias (left knee). Negative for gait problem, joint swelling, myalgias and neck pain.   Skin: Negative for rash.   Neurological: Negative for dizziness, tremors, syncope, weakness, numbness and headaches.   Hematological: Does not bruise/bleed easily.   Psychiatric/Behavioral: Negative for agitation, confusion and sleep disturbance. The patient is not nervous/anxious.        Objective:      Physical Exam   Constitutional: She is oriented to person, place, and time. She appears well-developed. No distress.   obese    HENT:   Head: Normocephalic and atraumatic.   Nose: Nose normal.   Mouth/Throat: Oropharynx is clear and moist.   Eyes: Pupils are equal, round, and reactive to light. Conjunctivae and EOM are normal. No scleral icterus.   Neck: Normal range of motion. Neck supple. No JVD present. No thyromegaly present.   Cardiovascular: Normal rate, regular rhythm and intact distal pulses. Exam reveals no gallop and no friction rub.   No murmur heard.  Pulmonary/Chest: Effort normal and breath sounds normal. No respiratory distress. She has no wheezes. She has no rales.   Abdominal: Soft. Bowel sounds are normal. She exhibits no distension. There is no tenderness. There is no rebound and no guarding.   Musculoskeletal: Normal range of motion. She exhibits no edema.   Lymphadenopathy:     She has no cervical adenopathy.   Neurological: She is alert and oriented to person, place, and time. No cranial nerve deficit or sensory deficit.   Skin: Skin is warm and dry. No rash noted. No erythema.   Psychiatric: She has a normal mood and affect. Her behavior is normal. Thought content normal.       Assessment:       1. Encounter to establish care    2. Essential hypertension    3. Mixed hyperlipidemia    4. Major depressive disorder, remission status unspecified, unspecified whether recurrent    5. Other chronic pain    6. Morbid obesity    7. Obstructive sleep apnea    8. Primary osteoarthritis of left knee    9. Need for hepatitis C screening test    10. Screening for cancer    11. Thyroid disease    12. Smoker    13. Breast cancer screening    14. Cervical cancer screening    15. Post menopausal problems    16. Chronic pain of left knee    17. Chronic pain disorder    18. Gastroesophageal reflux disease, esophagitis presence not specified    19. Personal history of nicotine dependence     20. Encounter for screening mammogram for malignant neoplasm of breast     21. Vitamin D deficiency        ==============================  -Today's  "Visit - patient is awake and alert.    -Nutrition - Obesity - BMI is 59.42 today.  Discussed diet and exercise.  Followed by Bariatrics and last seen in 4/2019 - "The patient is Potential  candidate for Bariatric Surgery. she is interested in laparoscopic Aleksander-en-Y with Dr. Cleveland. The surgery and post-op care were discussed in detail with the patient. All questions were answered.   she understands that bariatric surgery is a tool to aid in weight loss and that she needs to be committed to the diet and exercise post-operatively for successful weight loss.  Discussed expected weight loss outcomes after surgery which is 50% of the excess weight on her frame.  Goal weight is 200#s.  Discussed with patient that bariatric surgery is not the easy way out and that it will take plenty of dedication on the patient's part to be successful. Also discussed the possibility of weight regain if the patient strays from the diet guidelines or exercise requirements. Patient verbalized understanding and wishes to proceed with the work-up".    She needs to stop smoking, then will return for follow up.    -Cards - HTN.  HLD.  On Amlodipine.  On.Lisinopril.  BP today is good today.    We discussed BJ study with ACEI and Lung Cancer today - will continue for now and change to ARB in future.    Nuclear Stress was 7/2018 - Lexiscan Myoview SPECT perfusion stress study is negative for reversible ischemia.  2. Good left ventricular systolic function with calculated ejection fraction of 71%.  EKG in 12/2017 - Sinus rhythm  Sinus arrhythmia  Nonspecific Twave abnormality  Moderate left axis deviation.    Lipids in 5/2019 - , TG 91, HDL 43, LDL 57.8.  On Atorvastatin.    -Endocrine - MNG - s/p Laser treatment at [facility] in 7/2019.  Currently not on treatment.  TFTs normal in 5/2019 and 7/2019.  Will repeat TSH.    A1C normal in 5/2019.    On Vitamin D3.  Check level.    -Pulmonary - LAURA - on CPAP.  Last seen on 8/12/2019 by Sleep.    Smoker " - 5-10 cigs per day for the past 50 years.  Has an appointment with Smoking Cessation today.  Will order LDCT of chest for lung cancer screening.    -GI -  GERD - on Protonix and Carafate.  S/p EGD at [facility] in 9/2016.    We discussed colon cancer screening - last colonoscopy was done 2017 at [facility] - no polyps and repeat in 10 years.    Needs HCV Ab - will order.    -Heme - Normocytic Anemia - Hgb 11.7 with MCV 96 in 7/2019.  Fe sat of 11 in 5/2019.  On iron replacement.  Colonoscopy done in 2018 at [facility].    -GYN - Last Pap was many years ago - will refer for PAP.      Last Mammo was 2018 - will order repeat.      We discussed DXA screening for osteoporosis - will order.      -MSK - OA - Left Knee - last seen by Pain on 10/4/2019 - Unfortunately, knee pain has been refractory to interventional procedures. At this time, we have exhausted all options for interventional pain management.  1. Patient has tolerated opioids in the past and notes improved function and quality of life while taking opioids for pain for debilitating knee pain. She is a candidate for palliative care at this time.   2. Continue Percocet 5/325 mg BID PRN, #60. Refill sent today. She may call for 2 refills.   3. The patient is here today for a refill of current pain medications and they believe these provide effective pain control and improvements in quality of life.  The patient notes no serious side effects, and feels the benefits outweigh the risks.  The patient was reminded of the pain contract that they signed previously as well as the risks and benefits of the medication including possible death.  The updated [place] Board of Pharmacy prescription monitoring program was reviewed, and the patient has been found to be compliant with current treatment plan. Medication management provided by Dr. Vidal.  4. Pain Contract signed 9/4/2019.   5. UDS done today.   6. Continue Gabapentin 600 mg BID.   7. Patient does have some paresthesias in  "left knee which are expected with phenol chemodenervation.   8. Recommended patient followup with bariatric surgery clinic.   RTC in 3 months".    -HCM - We discussed Flu (10/4/2019) and Tdap (today at [facility] [place] by [facility]) vaccinations.  We discussed Shingles (waiting for Shingrix Availability - advised to get on a waiting list at pharmacy) and Pneumococcal (23 in 1 year and 13 today at [facility] [place]) vaccinations.     -Follow Up - 3 months with Dr. Simon  "

## 2019-12-10 ENCOUNTER — LAB VISIT (OUTPATIENT)
Dept: LAB | Facility: OTHER | Age: 69
End: 2019-12-10
Attending: INTERNAL MEDICINE
Payer: MEDICARE

## 2019-12-10 ENCOUNTER — CLINICAL SUPPORT (OUTPATIENT)
Dept: SMOKING CESSATION | Facility: CLINIC | Age: 69
End: 2019-12-10
Payer: COMMERCIAL

## 2019-12-10 ENCOUNTER — OFFICE VISIT (OUTPATIENT)
Dept: INTERNAL MEDICINE | Facility: CLINIC | Age: 69
End: 2019-12-10
Payer: MEDICARE

## 2019-12-10 VITALS
HEART RATE: 82 BPM | DIASTOLIC BLOOD PRESSURE: 76 MMHG | WEIGHT: 293 LBS | OXYGEN SATURATION: 96 % | SYSTOLIC BLOOD PRESSURE: 130 MMHG | HEIGHT: 66 IN | BODY MASS INDEX: 47.09 KG/M2

## 2019-12-10 DIAGNOSIS — Z87.891 PERSONAL HISTORY OF NICOTINE DEPENDENCE: ICD-10-CM

## 2019-12-10 DIAGNOSIS — G89.29 CHRONIC PAIN OF LEFT KNEE: ICD-10-CM

## 2019-12-10 DIAGNOSIS — E55.9 VITAMIN D DEFICIENCY: ICD-10-CM

## 2019-12-10 DIAGNOSIS — G89.29 OTHER CHRONIC PAIN: ICD-10-CM

## 2019-12-10 DIAGNOSIS — M17.12 PRIMARY OSTEOARTHRITIS OF LEFT KNEE: ICD-10-CM

## 2019-12-10 DIAGNOSIS — G47.33 OBSTRUCTIVE SLEEP APNEA: ICD-10-CM

## 2019-12-10 DIAGNOSIS — Z12.4 CERVICAL CANCER SCREENING: ICD-10-CM

## 2019-12-10 DIAGNOSIS — N95.9 POST MENOPAUSAL PROBLEMS: ICD-10-CM

## 2019-12-10 DIAGNOSIS — Z11.59 NEED FOR HEPATITIS C SCREENING TEST: ICD-10-CM

## 2019-12-10 DIAGNOSIS — G89.4 CHRONIC PAIN DISORDER: ICD-10-CM

## 2019-12-10 DIAGNOSIS — Z76.89 ENCOUNTER TO ESTABLISH CARE: Primary | ICD-10-CM

## 2019-12-10 DIAGNOSIS — E78.2 MIXED HYPERLIPIDEMIA: ICD-10-CM

## 2019-12-10 DIAGNOSIS — K21.9 GASTROESOPHAGEAL REFLUX DISEASE, ESOPHAGITIS PRESENCE NOT SPECIFIED: ICD-10-CM

## 2019-12-10 DIAGNOSIS — E66.01 MORBID OBESITY: ICD-10-CM

## 2019-12-10 DIAGNOSIS — Z12.9 SCREENING FOR CANCER: ICD-10-CM

## 2019-12-10 DIAGNOSIS — Z12.39 BREAST CANCER SCREENING: ICD-10-CM

## 2019-12-10 DIAGNOSIS — Z12.31 ENCOUNTER FOR SCREENING MAMMOGRAM FOR MALIGNANT NEOPLASM OF BREAST: ICD-10-CM

## 2019-12-10 DIAGNOSIS — F32.9 MAJOR DEPRESSIVE DISORDER, REMISSION STATUS UNSPECIFIED, UNSPECIFIED WHETHER RECURRENT: ICD-10-CM

## 2019-12-10 DIAGNOSIS — I10 ESSENTIAL HYPERTENSION: ICD-10-CM

## 2019-12-10 DIAGNOSIS — F17.210 MODERATE CIGARETTE SMOKER (10-19 PER DAY): Primary | ICD-10-CM

## 2019-12-10 DIAGNOSIS — M25.562 CHRONIC PAIN OF LEFT KNEE: ICD-10-CM

## 2019-12-10 DIAGNOSIS — F17.200 SMOKER: ICD-10-CM

## 2019-12-10 DIAGNOSIS — E07.9 THYROID DISEASE: ICD-10-CM

## 2019-12-10 PROBLEM — G47.30 SLEEP APNEA: Status: RESOLVED | Noted: 2019-04-29 | Resolved: 2019-12-10

## 2019-12-10 LAB
25(OH)D3+25(OH)D2 SERPL-MCNC: 41 NG/ML (ref 30–96)
ALBUMIN SERPL BCP-MCNC: 3.6 G/DL (ref 3.5–5.2)
ALP SERPL-CCNC: 93 U/L (ref 55–135)
ALT SERPL W/O P-5'-P-CCNC: 13 U/L (ref 10–44)
ANION GAP SERPL CALC-SCNC: 8 MMOL/L (ref 8–16)
AST SERPL-CCNC: 13 U/L (ref 10–40)
BILIRUB SERPL-MCNC: 0.7 MG/DL (ref 0.1–1)
BUN SERPL-MCNC: 11 MG/DL (ref 8–23)
CALCIUM SERPL-MCNC: 9.8 MG/DL (ref 8.7–10.5)
CHLORIDE SERPL-SCNC: 109 MMOL/L (ref 95–110)
CO2 SERPL-SCNC: 26 MMOL/L (ref 23–29)
CREAT SERPL-MCNC: 0.8 MG/DL (ref 0.5–1.4)
EST. GFR  (AFRICAN AMERICAN): >60 ML/MIN/1.73 M^2
EST. GFR  (NON AFRICAN AMERICAN): >60 ML/MIN/1.73 M^2
GLUCOSE SERPL-MCNC: 93 MG/DL (ref 70–110)
MAGNESIUM SERPL-MCNC: 2.1 MG/DL (ref 1.6–2.6)
POTASSIUM SERPL-SCNC: 5 MMOL/L (ref 3.5–5.1)
PROT SERPL-MCNC: 7.3 G/DL (ref 6–8.4)
SODIUM SERPL-SCNC: 143 MMOL/L (ref 136–145)
TSH SERPL DL<=0.005 MIU/L-ACNC: 0.91 UIU/ML (ref 0.4–4)

## 2019-12-10 PROCEDURE — 84443 ASSAY THYROID STIM HORMONE: CPT

## 2019-12-10 PROCEDURE — 80053 COMPREHEN METABOLIC PANEL: CPT

## 2019-12-10 PROCEDURE — 99404 PR PREVENT COUNSEL,INDIV,60 MIN: ICD-10-PCS | Mod: S$GLB,,,

## 2019-12-10 PROCEDURE — 99204 OFFICE O/P NEW MOD 45 MIN: CPT | Mod: S$PBB,,, | Performed by: INTERNAL MEDICINE

## 2019-12-10 PROCEDURE — 1159F PR MEDICATION LIST DOCUMENTED IN MEDICAL RECORD: ICD-10-PCS | Mod: ,,, | Performed by: INTERNAL MEDICINE

## 2019-12-10 PROCEDURE — 99999 PR PBB SHADOW E&M-EST. PATIENT-LVL IV: CPT | Mod: PBBFAC,,, | Performed by: INTERNAL MEDICINE

## 2019-12-10 PROCEDURE — 82306 VITAMIN D 25 HYDROXY: CPT

## 2019-12-10 PROCEDURE — 83735 ASSAY OF MAGNESIUM: CPT

## 2019-12-10 PROCEDURE — 99999 PR PBB SHADOW E&M-EST. PATIENT-LVL IV: ICD-10-PCS | Mod: PBBFAC,,, | Performed by: INTERNAL MEDICINE

## 2019-12-10 PROCEDURE — 99999 PR PBB SHADOW E&M-EST. PATIENT-LVL I: ICD-10-PCS | Mod: PBBFAC,,,

## 2019-12-10 PROCEDURE — 99214 OFFICE O/P EST MOD 30 MIN: CPT | Mod: PBBFAC | Performed by: INTERNAL MEDICINE

## 2019-12-10 PROCEDURE — 99204 PR OFFICE/OUTPT VISIT, NEW, LEVL IV, 45-59 MIN: ICD-10-PCS | Mod: S$PBB,,, | Performed by: INTERNAL MEDICINE

## 2019-12-10 PROCEDURE — 1126F PR PAIN SEVERITY QUANTIFIED, NO PAIN PRESENT: ICD-10-PCS | Mod: ,,, | Performed by: INTERNAL MEDICINE

## 2019-12-10 PROCEDURE — 36415 COLL VENOUS BLD VENIPUNCTURE: CPT

## 2019-12-10 PROCEDURE — 99999 PR PBB SHADOW E&M-EST. PATIENT-LVL I: CPT | Mod: PBBFAC,,,

## 2019-12-10 PROCEDURE — 1159F MED LIST DOCD IN RCRD: CPT | Mod: ,,, | Performed by: INTERNAL MEDICINE

## 2019-12-10 PROCEDURE — 1126F AMNT PAIN NOTED NONE PRSNT: CPT | Mod: ,,, | Performed by: INTERNAL MEDICINE

## 2019-12-10 PROCEDURE — 99404 PREV MED CNSL INDIV APPRX 60: CPT | Mod: S$GLB,,,

## 2019-12-10 PROCEDURE — 86803 HEPATITIS C AB TEST: CPT

## 2019-12-10 RX ORDER — PREDNISOLONE ACETATE 10 MG/ML
SUSPENSION/ DROPS OPHTHALMIC
COMMUNITY
End: 2019-12-10

## 2019-12-10 RX ORDER — AMLODIPINE BESYLATE 10 MG/1
10 TABLET ORAL DAILY
Qty: 90 TABLET | Refills: 1 | Status: SHIPPED | OUTPATIENT
Start: 2019-12-10 | End: 2020-04-01 | Stop reason: SDUPTHER

## 2019-12-10 RX ORDER — ESCITALOPRAM OXALATE 20 MG/1
TABLET ORAL
COMMUNITY
End: 2019-12-10

## 2019-12-10 RX ORDER — VENLAFAXINE HYDROCHLORIDE 150 MG/1
CAPSULE, EXTENDED RELEASE ORAL
COMMUNITY
End: 2019-12-10

## 2019-12-10 RX ORDER — DICLOFENAC SODIUM 1 MG/ML
SOLUTION/ DROPS OPHTHALMIC
COMMUNITY
End: 2019-12-10

## 2019-12-10 RX ORDER — ATORVASTATIN CALCIUM 40 MG/1
40 TABLET, FILM COATED ORAL DAILY
Qty: 90 TABLET | Refills: 1 | Status: SHIPPED | OUTPATIENT
Start: 2019-12-10 | End: 2020-03-27 | Stop reason: SDUPTHER

## 2019-12-10 RX ORDER — ALBUTEROL SULFATE 90 UG/1
2 AEROSOL, METERED RESPIRATORY (INHALATION) EVERY 6 HOURS PRN
Qty: 18 G | Refills: 2 | Status: SHIPPED | OUTPATIENT
Start: 2019-12-10 | End: 2020-01-07

## 2019-12-10 RX ORDER — GABAPENTIN 300 MG/1
600 CAPSULE ORAL 2 TIMES DAILY
Qty: 360 CAPSULE | Refills: 1 | Status: SHIPPED | OUTPATIENT
Start: 2019-12-10 | End: 2020-01-06 | Stop reason: SDUPTHER

## 2019-12-10 RX ORDER — TRAZODONE HYDROCHLORIDE 50 MG/1
TABLET ORAL
COMMUNITY
End: 2019-12-10

## 2019-12-10 RX ORDER — ALBUTEROL SULFATE 90 UG/1
AEROSOL, METERED RESPIRATORY (INHALATION)
COMMUNITY
End: 2019-12-10 | Stop reason: SDUPTHER

## 2019-12-10 RX ORDER — OMEPRAZOLE 40 MG/1
CAPSULE, DELAYED RELEASE ORAL
COMMUNITY
End: 2019-12-10

## 2019-12-10 RX ORDER — DM/P-EPHED/ACETAMINOPH/DOXYLAM 30-7.5/3
2 LIQUID (ML) ORAL
Qty: 144 LOZENGE | Refills: 0 | Status: SHIPPED | OUTPATIENT
Start: 2019-12-10 | End: 2020-01-07

## 2019-12-10 RX ORDER — TRAMADOL HYDROCHLORIDE 50 MG/1
TABLET ORAL
COMMUNITY
End: 2019-12-10

## 2019-12-10 RX ORDER — SUCRALFATE 1 G/1
1 TABLET ORAL
Qty: 360 TABLET | Refills: 1 | Status: SHIPPED | OUTPATIENT
Start: 2019-12-10 | End: 2020-06-02

## 2019-12-10 RX ORDER — MIRTAZAPINE 30 MG/1
TABLET, FILM COATED ORAL
COMMUNITY
End: 2019-12-10

## 2019-12-10 RX ORDER — GABAPENTIN 600 MG/1
TABLET ORAL
COMMUNITY
End: 2019-12-10

## 2019-12-10 RX ORDER — AMITRIPTYLINE HYDROCHLORIDE 25 MG/1
TABLET, FILM COATED ORAL
COMMUNITY
End: 2019-12-10

## 2019-12-10 RX ORDER — LISINOPRIL 40 MG/1
40 TABLET ORAL DAILY
Qty: 90 TABLET | Refills: 1 | Status: SHIPPED | OUTPATIENT
Start: 2019-12-10 | End: 2020-04-01 | Stop reason: SDUPTHER

## 2019-12-10 RX ORDER — IBUPROFEN 200 MG
1 TABLET ORAL DAILY
Qty: 14 PATCH | Refills: 0 | Status: SHIPPED | OUTPATIENT
Start: 2019-12-10 | End: 2020-01-07

## 2019-12-10 RX ORDER — PANTOPRAZOLE SODIUM 40 MG/1
40 TABLET, DELAYED RELEASE ORAL 2 TIMES DAILY
Qty: 90 TABLET | Refills: 1 | Status: SHIPPED | OUTPATIENT
Start: 2019-12-10 | End: 2020-04-30 | Stop reason: SDUPTHER

## 2019-12-10 NOTE — PATIENT INSTRUCTIONS
Your weight and BMI are elevated today.  Target BMI is less than 25.  Please start or countinue diet changes and exercise.     Your blood pressure was good.    I will order routine non-fasting labs today.    We will give you the Tdap Vaccine today at [facility].  We will give you the Pneumococcal 13 Vaccine today at [facility].  I recommend getting on a waiting list at your local pharmacy for the Shingles vaccine (Shingrix) is interested.    I will order a low dose CT scan of the chest to screen for lung cancer.  I will order a Mammogram and Bone Density test.  I will refer you to GYN for your female exam and PAP smear.    Return in 3 months with Dr. Simon - sabrina if needed.  Please come at least 15-20 minutes before your scheduled appointment time.

## 2019-12-11 DIAGNOSIS — G89.4 CHRONIC PAIN DISORDER: ICD-10-CM

## 2019-12-11 DIAGNOSIS — M17.12 PRIMARY OSTEOARTHRITIS OF LEFT KNEE: ICD-10-CM

## 2019-12-11 DIAGNOSIS — M25.562 CHRONIC PAIN OF LEFT KNEE: ICD-10-CM

## 2019-12-11 DIAGNOSIS — G89.29 CHRONIC PAIN OF LEFT KNEE: ICD-10-CM

## 2019-12-11 LAB — HCV AB SERPL QL IA: NEGATIVE

## 2019-12-11 RX ORDER — OXYCODONE AND ACETAMINOPHEN 5; 325 MG/1; MG/1
1 TABLET ORAL 2 TIMES DAILY
Qty: 60 TABLET | Refills: 0 | Status: SHIPPED | OUTPATIENT
Start: 2019-12-11 | End: 2019-12-13 | Stop reason: SDUPTHER

## 2019-12-11 NOTE — TELEPHONE ENCOUNTER
Patient requesting refill on Oxycodone-acetaminophen (PERCOCET) 5-325 mg per tablet    Last office visit 10/04/2019     shows last refill on 11/14/2019    Patient does have a pain contract on file with [facility] department    Patient last UDS 10/04/2019 was consistent with current therapy    Please review.  Thanks

## 2019-12-11 NOTE — TELEPHONE ENCOUNTER
----- Message from Shayla Aguilar sent at 12/11/2019 12:35 PM CST -----  Contact: LU RENAE [[id number]]  Please refill the medication(s) listed below :    Medication # 1 : oxyCODONE-acetaminophen (PERCOCET) 5-325 mg per tablet    Medication # 2 :    Can the clinic reply in [facility] :  No     Please call the patient when the prescription(s) is ready for  : [phone]    Preferred Pharmacy : [facility] - [address]

## 2019-12-12 ENCOUNTER — TELEPHONE (OUTPATIENT)
Dept: PHARMACY | Facility: CLINIC | Age: 69
End: 2019-12-12

## 2019-12-13 ENCOUNTER — TELEPHONE (OUTPATIENT)
Dept: PAIN MEDICINE | Facility: CLINIC | Age: 69
End: 2019-12-13

## 2019-12-13 DIAGNOSIS — M17.12 PRIMARY OSTEOARTHRITIS OF LEFT KNEE: ICD-10-CM

## 2019-12-13 DIAGNOSIS — G89.4 CHRONIC PAIN DISORDER: ICD-10-CM

## 2019-12-13 DIAGNOSIS — G89.29 CHRONIC PAIN OF LEFT KNEE: ICD-10-CM

## 2019-12-13 DIAGNOSIS — M25.562 CHRONIC PAIN OF LEFT KNEE: ICD-10-CM

## 2019-12-13 RX ORDER — OXYCODONE AND ACETAMINOPHEN 5; 325 MG/1; MG/1
1 TABLET ORAL 2 TIMES DAILY
Qty: 60 TABLET | Refills: 0 | Status: SHIPPED | OUTPATIENT
Start: 2019-12-13 | End: 2020-01-06 | Stop reason: SDUPTHER

## 2019-12-13 NOTE — TELEPHONE ENCOUNTER
----- Message from Blanca Mejia sent at 12/13/2019 12:08 PM CST -----  Contact: LU RENAE [[id number]]  Name of Who is Calling: LU RENAE [[id number]]      What is the request in detail: Pt is requesting a call back from clinical team in regard tooxyCODONE-acetaminophen (PERCOCET) 5-325 mg per tablet  Pt state the medication was sent to wrong pharmacy please send [address] [phone] (Phone)  [phone] (FaxPlease contact to further discuss and advise.          Can the clinic reply by [facility]:       What Number to Call Back if not in [facility]: [phone]

## 2019-12-13 NOTE — TELEPHONE ENCOUNTER
Patient states her medication went to the wrong pharmacy, and would like for the medication to be sent to [facility].    Staff spoke with Jorden ([facility]) and canceled the prescription that was ordered.     Patient requesting refill on Ocycodone-acetaminpphen (PERCOCET) 5-325 mg per tablet    Last office visit 10/04/2019     shows last refill on 11/14/2019    Patient does have a pain contract on file with [facility] department    Patient last UDS 10/04/2019 was consistent with current therapy    Please review.  Thanks

## 2019-12-13 NOTE — TELEPHONE ENCOUNTER
You approved patient's percocet but she states it was sent to the incorrect pharmacy patient would like her prescription to be sent to  [facility] [address] [place] [address] [phone] (Phone)  [phone] (Fax)

## 2019-12-13 NOTE — TELEPHONE ENCOUNTER
----- Message from Cnadace Dougherty sent at 12/13/2019  9:36 AM CST -----  Name of Who is Calling:     What is the request in detail: Would like to inform provider that oxyCODONE-acetaminophen (PERCOCET) 5-325 mg per tablet prescription was sent to the wrong pharmacy. Please send to [facility]. Please contact to further discuss and advise      Can the clinic reply by [facility]: no    What Number to Call Back [facility] not [facility]: [phone]

## 2019-12-16 ENCOUNTER — TELEPHONE (OUTPATIENT)
Dept: PAIN MEDICINE | Facility: CLINIC | Age: 69
End: 2019-12-16

## 2019-12-16 NOTE — TELEPHONE ENCOUNTER
----- Message from Kallie Pickens, Patient Care Assistant sent at 12/16/2019  9:26 AM CST -----  Contact: LU RENAE [[id number]]  Type:  Patient Returning Call    Who Called: LU RENAE [[id number]]    Who Left Message for Patient: Patient wasn't sure    Does the patient know what this is regarding?: No    Best Call Back Number:[phone]    Additional Information:  Patient states [facility] wants to speak with staff in regards of Rx oxyCODONE-acetaminophen (PERCOCET) 5-325 mg per tablet.

## 2019-12-16 NOTE — TELEPHONE ENCOUNTER
Staff contacted the patient to inform her that her request for a refill has been approved and e prescribed to her requested pharmacy [address].    Patient did not answer therefore staff left a detailed voice message inform the patient of the above information.

## 2019-12-16 NOTE — TELEPHONE ENCOUNTER
Staff contacted [facility] and spoke with pharmacist. Pharmacist needed a diagnosis code and patient's next office visit. This was provided by staff.

## 2019-12-16 NOTE — TELEPHONE ENCOUNTER
I was already asked to resend this to [facility] on [facility]", where the prescription is.  It is available for  now. Can you please clarify to which pharmacy she would like this sent?    Thanks!  LW

## 2020-01-02 ENCOUNTER — HOSPITAL ENCOUNTER (OUTPATIENT)
Dept: RADIOLOGY | Facility: OTHER | Age: 70
Discharge: HOME | End: 2020-01-02
Attending: INTERNAL MEDICINE
Payer: MEDICARE

## 2020-01-02 ENCOUNTER — PATIENT OUTREACH (OUTPATIENT)
Dept: ADMINISTRATIVE | Facility: OTHER | Age: 70
End: 2020-01-02

## 2020-01-02 DIAGNOSIS — Z12.39 BREAST CANCER SCREENING: ICD-10-CM

## 2020-01-02 DIAGNOSIS — Z12.9 SCREENING FOR CANCER: ICD-10-CM

## 2020-01-02 DIAGNOSIS — N95.9 POST MENOPAUSAL PROBLEMS: ICD-10-CM

## 2020-01-02 DIAGNOSIS — Z12.31 ENCOUNTER FOR SCREENING MAMMOGRAM FOR MALIGNANT NEOPLASM OF BREAST: ICD-10-CM

## 2020-01-02 DIAGNOSIS — Z87.891 PERSONAL HISTORY OF NICOTINE DEPENDENCE: ICD-10-CM

## 2020-01-02 DIAGNOSIS — Z12.11 ENCOUNTER FOR FECAL IMMUNOCHEMICAL TEST SCREENING: Primary | ICD-10-CM

## 2020-01-02 PROCEDURE — 77067 MAMMO DIGITAL SCREENING BILAT WITH CAD: ICD-10-PCS | Mod: 26,,, | Performed by: RADIOLOGY

## 2020-01-02 PROCEDURE — 77080 DXA BONE DENSITY AXIAL: CPT | Mod: 26,,, | Performed by: RADIOLOGY

## 2020-01-02 PROCEDURE — G0297 LDCT FOR LUNG CA SCREEN: HCPCS | Mod: TC

## 2020-01-02 PROCEDURE — 77080 DEXA BONE DENSITY SPINE HIP: ICD-10-PCS | Mod: 26,,, | Performed by: RADIOLOGY

## 2020-01-02 PROCEDURE — G0297 CT CHEST LUNG SCREENING LOW DOSE: ICD-10-PCS | Mod: 26,,, | Performed by: RADIOLOGY

## 2020-01-02 PROCEDURE — 77067 SCR MAMMO BI INCL CAD: CPT | Mod: 26,,, | Performed by: RADIOLOGY

## 2020-01-02 PROCEDURE — 77067 SCR MAMMO BI INCL CAD: CPT | Mod: TC

## 2020-01-02 PROCEDURE — G0297 LDCT FOR LUNG CA SCREEN: HCPCS | Mod: 26,,, | Performed by: RADIOLOGY

## 2020-01-02 PROCEDURE — 77080 DXA BONE DENSITY AXIAL: CPT | Mod: TC

## 2020-01-02 NOTE — PROGRESS NOTES
Updates from Care Everywhere updated. Immunizations reconciled.  updated. Noted 12/10/2019 that patient received a colonoscopy at [facility] in 2017. No record on file. GEO sent to [facility]. Order placed for IFOBT.

## 2020-01-03 ENCOUNTER — IMMUNIZATION (OUTPATIENT)
Dept: PHARMACY | Facility: CLINIC | Age: 70
End: 2020-01-03

## 2020-01-03 ENCOUNTER — IMMUNIZATION (OUTPATIENT)
Dept: PHARMACY | Facility: CLINIC | Age: 70
End: 2020-01-03
Payer: MEDICARE

## 2020-01-06 ENCOUNTER — CLINICAL SUPPORT (OUTPATIENT)
Dept: SMOKING CESSATION | Facility: CLINIC | Age: 70
End: 2020-01-06
Payer: COMMERCIAL

## 2020-01-06 ENCOUNTER — OFFICE VISIT (OUTPATIENT)
Dept: PAIN MEDICINE | Facility: CLINIC | Age: 70
End: 2020-01-06
Payer: MEDICARE

## 2020-01-06 ENCOUNTER — OFFICE VISIT (OUTPATIENT)
Dept: OBSTETRICS AND GYNECOLOGY | Facility: CLINIC | Age: 70
End: 2020-01-06
Payer: MEDICARE

## 2020-01-06 VITALS
SYSTOLIC BLOOD PRESSURE: 127 MMHG | OXYGEN SATURATION: 100 % | TEMPERATURE: 99 F | HEART RATE: 71 BPM | DIASTOLIC BLOOD PRESSURE: 77 MMHG | HEIGHT: 66 IN | BODY MASS INDEX: 47.09 KG/M2 | RESPIRATION RATE: 18 BRPM | WEIGHT: 293 LBS

## 2020-01-06 VITALS
BODY MASS INDEX: 47.09 KG/M2 | SYSTOLIC BLOOD PRESSURE: 127 MMHG | WEIGHT: 293 LBS | DIASTOLIC BLOOD PRESSURE: 77 MMHG | HEIGHT: 66 IN

## 2020-01-06 DIAGNOSIS — G89.29 CHRONIC PAIN OF LEFT KNEE: ICD-10-CM

## 2020-01-06 DIAGNOSIS — Z79.891 ENCOUNTER FOR MONITORING OPIOID MAINTENANCE THERAPY: ICD-10-CM

## 2020-01-06 DIAGNOSIS — Z01.419 WELL WOMAN EXAM WITH ROUTINE GYNECOLOGICAL EXAM: Primary | ICD-10-CM

## 2020-01-06 DIAGNOSIS — M25.562 CHRONIC PAIN OF LEFT KNEE: ICD-10-CM

## 2020-01-06 DIAGNOSIS — Z12.31 ENCOUNTER FOR SCREENING MAMMOGRAM FOR MALIGNANT NEOPLASM OF BREAST: ICD-10-CM

## 2020-01-06 DIAGNOSIS — M17.12 PRIMARY OSTEOARTHRITIS OF LEFT KNEE: ICD-10-CM

## 2020-01-06 DIAGNOSIS — F17.210 CIGARETTE NICOTINE DEPENDENCE, UNCOMPLICATED: Primary | ICD-10-CM

## 2020-01-06 DIAGNOSIS — G89.4 CHRONIC PAIN DISORDER: Primary | ICD-10-CM

## 2020-01-06 DIAGNOSIS — Z51.81 ENCOUNTER FOR MONITORING OPIOID MAINTENANCE THERAPY: ICD-10-CM

## 2020-01-06 DIAGNOSIS — G89.4 CHRONIC PAIN DISORDER: ICD-10-CM

## 2020-01-06 PROCEDURE — 99213 OFFICE O/P EST LOW 20 MIN: CPT | Mod: PBBFAC | Performed by: NURSE PRACTITIONER

## 2020-01-06 PROCEDURE — 99214 PR OFFICE/OUTPT VISIT, EST, LEVL IV, 30-39 MIN: ICD-10-PCS | Mod: HCNC,S$GLB,, | Performed by: NURSE PRACTITIONER

## 2020-01-06 PROCEDURE — 99999 PR PBB SHADOW E&M-EST. PATIENT-LVL III: ICD-10-PCS | Mod: PBBFAC,HCNC,, | Performed by: NURSE PRACTITIONER

## 2020-01-06 PROCEDURE — 99999 PR PBB SHADOW E&M-EST. PATIENT-LVL I: CPT | Mod: PBBFAC,,,

## 2020-01-06 PROCEDURE — G0101 PR CA SCREEN;PELVIC/BREAST EXAM: ICD-10-PCS | Mod: HCNC,S$GLB,, | Performed by: OBSTETRICS & GYNECOLOGY

## 2020-01-06 PROCEDURE — 99999 PR PBB SHADOW E&M-EST. PATIENT-LVL II: CPT | Mod: PBBFAC,HCNC,, | Performed by: OBSTETRICS & GYNECOLOGY

## 2020-01-06 PROCEDURE — 1125F PR PAIN SEVERITY QUANTIFIED, PAIN PRESENT: ICD-10-PCS | Mod: HCNC,S$GLB,, | Performed by: NURSE PRACTITIONER

## 2020-01-06 PROCEDURE — G0101 CA SCREEN;PELVIC/BREAST EXAM: HCPCS | Mod: HCNC,S$GLB,, | Performed by: OBSTETRICS & GYNECOLOGY

## 2020-01-06 PROCEDURE — G0101 CA SCREEN;PELVIC/BREAST EXAM: HCPCS | Mod: PBBFAC | Performed by: OBSTETRICS & GYNECOLOGY

## 2020-01-06 PROCEDURE — 99999 PR PBB SHADOW E&M-EST. PATIENT-LVL III: CPT | Mod: PBBFAC,HCNC,, | Performed by: NURSE PRACTITIONER

## 2020-01-06 PROCEDURE — 99404 PR PREVENT COUNSEL,INDIV,60 MIN: ICD-10-PCS | Mod: S$GLB,,,

## 2020-01-06 PROCEDURE — 99214 OFFICE O/P EST MOD 30 MIN: CPT | Mod: HCNC,S$GLB,, | Performed by: NURSE PRACTITIONER

## 2020-01-06 PROCEDURE — 1125F AMNT PAIN NOTED PAIN PRSNT: CPT | Mod: HCNC,S$GLB,, | Performed by: NURSE PRACTITIONER

## 2020-01-06 PROCEDURE — 1159F MED LIST DOCD IN RCRD: CPT | Mod: HCNC,S$GLB,, | Performed by: NURSE PRACTITIONER

## 2020-01-06 PROCEDURE — 99999 PR PBB SHADOW E&M-EST. PATIENT-LVL I: ICD-10-PCS | Mod: PBBFAC,,,

## 2020-01-06 PROCEDURE — 99212 OFFICE O/P EST SF 10 MIN: CPT | Mod: PBBFAC,27 | Performed by: OBSTETRICS & GYNECOLOGY

## 2020-01-06 PROCEDURE — 99404 PREV MED CNSL INDIV APPRX 60: CPT | Mod: S$GLB,,,

## 2020-01-06 PROCEDURE — 1159F PR MEDICATION LIST DOCUMENTED IN MEDICAL RECORD: ICD-10-PCS | Mod: HCNC,S$GLB,, | Performed by: NURSE PRACTITIONER

## 2020-01-06 PROCEDURE — 99999 PR PBB SHADOW E&M-EST. PATIENT-LVL II: ICD-10-PCS | Mod: PBBFAC,HCNC,, | Performed by: OBSTETRICS & GYNECOLOGY

## 2020-01-06 RX ORDER — OXYCODONE AND ACETAMINOPHEN 5; 325 MG/1; MG/1
1 TABLET ORAL 2 TIMES DAILY
Qty: 60 TABLET | Refills: 0 | Status: SHIPPED | OUTPATIENT
Start: 2020-01-15 | End: 2020-02-13 | Stop reason: SDUPTHER

## 2020-01-06 RX ORDER — GABAPENTIN 300 MG/1
600 CAPSULE ORAL 2 TIMES DAILY
Qty: 360 CAPSULE | Refills: 2 | Status: SHIPPED | OUTPATIENT
Start: 2020-01-06 | End: 2020-06-02 | Stop reason: SDUPTHER

## 2020-01-06 NOTE — PROGRESS NOTES
Individual Follow-Up Form    1/6/2020    Quit Date: 1/2/19    Clinical Status of Patient: Outpatient    Length of Service: 45 minutes    Continuing Medication: no    Other Medications:      Target Symptoms: Withdrawal and medication side effects. The following were  rated moderate (3) to severe (4) on TCRS:  · Moderate (3): none  · Severe (4): none    Comments: Patient is tobacco free since 1/2/19.  Patient is not using nicotine patches or lozenge at this time.   Patient reports using willpower only to maintain quit.  Patient is in need of bariatric surgery which can't be done until she quits tobacco use.  Patient therefore remains motivated for that reason.   Patient no longer drinks 4 cups of coffee. She reduced to 1 cup a day which has helped with quit.  The patient denies any abnormal behavioral or mental changes at this time. The patient will continue with  therapy sessions and medication monitoring by CTTS. Prescribed medication management will be by physician.       Diagnosis: F17.210    Next Visit: 1 week

## 2020-01-06 NOTE — LETTER
January 7, 2020      Josep Chung MD  [address] [address]           [facility] [id number] [address]  [address] [facility] [address]  Phone: [phone]          Patient: Ca Coats   MR Number: [id number]   YOB: 1950   Date of Visit: 1/6/2020       Dear Dr. Josep Chung:    Thank you for referring Ca Coats to me for evaluation. Attached you will find relevant portions of my assessment and plan of care.    If you have questions, please do not hesitate to call me. I look forward to following Ca Coats along with you.    Sincerely,    Reginald Beebe MD    Enclosure  CC:  No Recipients    If you would like to receive this communication electronically, please contact [email or web address] or [phone] to request more information on [employer] access.    For providers and/or their staff who would like to refer a patient to [facility], please contact us through our one-stop-shop provider referral line, [facility], at [phone].    If you feel you have received this communication in error or would no longer like to receive these types of communications, please e-mail [email or web address]

## 2020-01-06 NOTE — PROGRESS NOTES
"Subjective:     Patient ID: Ca Coats is a 69 y.o. female.    Chief Complaint: Pain    Consulted by: Self      Disclaimer: This note was generated using voice recognition software.  There may be a typographical errors that were missed during proofreading.      HPI:    Ca Coats is a 69 y.o. female who presents today with left knee pain. Her pain has been going on for "too long."  She was previously treated by Dr. Iyer at [facility].  She has injections [id number] with him.  These were helpful, but she does not know if the injections were steroid or viscosupplementation.   This pain is described in detail below.    Interval History (4/30/2018):  The patient returns to clinic today for follow up and records review. We did received records from [facility] including a MRI of her knee. Dr. Iyer's last office visit note from January 2017 does not include any previous injections. She continues to report bilateral knee pain, left greater than right. She describes this pain as constant and aching. This pain is worse with prolonged walking. She also report right shoulder pain with prolonged overhead activity. She continues to take Percocet with benefit. She denies any other health changes.    Interval History (5/31/2018):  The patient returns to clinic for follow up. She is s/p left knee Synvisc One injection on 5/8/2018. She reports 35% relief of her knee pain. She continues to report bilateral knee pain, left greater than right. She describes this pain as constant and aching. Her pain is worse with prolonged walking and standing. She is scheduled to see Orthopedics at the  End of this month. She is also following up with Bariatrics to discuss the lap band procedure. She continues to take Percocet with benefit. She also uses Voltaren gel with benefit but this is expensive for her. She denies any other health changes.         Interval History (7/2/2018):  The patient returns to clinic today for " "follow up. She continues to report left knee pain that is constant, sharp, and aching in nature. Her pain is worse with prolonged walking and standing. She is scheduled for weight loss surgery in August. She continues follow up with orthopedics who does not recommend surgery at this time due to her BMI. She continues to take Percocet as needed with benefit. She denies any other health changes.     Interval History (7/19/2018):  The patient returns to clinic today for follow up. She is s/p left genicular nerve block on 7/10/2018. She reports 80% relief of her left knee pain. She reports that she was able to walk for longer distances (3 blocks) after the block. Her pain has returned to baseline. She continues to report left knee pain that is constant, sharp, and aching in nature. Her pain is worse with prolonged walking and standing. She denies any other health changes.     Interval History (8/21/2018):  The patient returns to clinic today for follow up. She is s/p left genicular cooled RFA on 7/31/2018. She reports 60% relief of her left knee pain. She reports intermittent knee pain that is sore and aching in nature. She continues to perform a home exercise routine. She is actively trying to lose weight. She continues to follow up with Bariatric Surgery at [facility]. She is considering weight loss surgery. She continues to use compound cream with benefit. She denies any other health changes.     Interval History (11/21/2018):  The patient returns to clinic today for follow up. She reports increased left knee pain this past week. She reports 2 episodes where her knee has "locked" up on her while walking. She describes her knee pain as sore and aching. She is actively trying to lose weight and quit smoking. She continues to use the compound cream with benefit. She denies any other health changes.     Interval History (1/9/2019):  The patient returns to clinic today for follow up. She is s/p left knee steroid injection on " "12/21/2018. She reports one day of relief. She continues to report left knee pain that is constant, sharp, and aching in nature. Her pain is worse with standing and walking. She reports that her knee "locks" up on her while walking. She often feels as though she is going to fall. She is scheduled for bariatric weight loss surgery in February at [facility]. She denies any other health changes.     Interval History (2/28/2019):  The patient returns to clinic today for follow up. She is s/p Euflexxa series completed on 1/30/2019. She reports that she able to stand for longer periods of time since the procedure. She continues to report left knee pain that is sore and aching in nature. Her pain is worse with prolonged standing and walking. She was previously scheduled for weight loss surgery but reports this was canceled. She is scheduled for follow up next week about this. She denies any other health changes.     Interval History (4/12/2019):  The patient returns for f/u of left knee pain.  She is s/p left genicular cooled RFA with about 90% pain relief.  She has been able to walk easier.  She also notices less stiffness in her knee.  She is planning on gastric surgery prior to knee replacement.  She had benefit with compounding cream but it is no longer covered by her insurance.  She does take Percocet from her PCP.  Her pain today is 7/10.    Interval History (7/11/19):  Patient reported to ED today for increased L knee pain and an episode of LLE weakness that lead to a near fall. Patient is s/p L genicular RFA in 3/19/19 that provided 90% relief for 2 months then came back. Pain is a crunchy pain, in left knee, that does not radiate, worse with movement, better with rest. Pain today is 9/10. She normally uses cane to ambulate but is currently using wheelchair at hospital. Denies any trauma ot the area, fever/chills, n/v, abdominal pain, or HA.    Interval History (8/8/2019):  She returns today for follow up s/p left knee " genicular chemodenervation with phenol 7/19/19.  She reports that this procedure did not provide any improvement in her left knee symptoms, and she is still having significant difficulty with ambulation, still uses wheelchair for mobility. She also reports left lateral thigh and hip pain lateral upper thigh numbness. She has been admitted to [facility] but is not receiving PT there. She is still considering lap band surgery but has missed a followup appointment.     Interval History (9/4/2019):  The patient returns to clinic today for follow up. She continues to report intermittent left lateral thigh and hip pain that is tingling and numb in nature. She continues to report left knee pain. She continues to have difficulty ambulating due to pain. She is currently living in a SNF. She continues to take Gabapentin 300 mg BID. This was increased at last visit but not increased at the nursing home. She does report benefit with Percocet though it only last approximately 4-5 hours. She denies any adverse effects. She does present with a care attendant from the SNF today. Her pain today is 10/10.    Interval History (10/4/2019):  The patient returns to clinic today for follow up and medication refill. She continues to report bilateral knee pain that is aching in nature. She reports intermittent left lateral thigh pain that is tingling and shooting in nature. She reports that her pain has been improving since last visit. She has increased her activity. She continues to report benefit with Gabapentin. She continues to take Percocet as needed with benefit. She denies any other health changes. Her pain today is 0/10.    Interval History (1/6/2020):  The patient returns to clinic today for follow up. She reports increased left leg pain today that is tingling in nature. She continues to report bilateral knee pain, left greater than right. She describes this pain as constant, sharp, and aching. She is no longer living at  the nursing facility. She has increased her home exercise routine. She is actively trying to quit smoking in order to move forward with bariatric surgery. She continues to report benefit with current medication regimen. She takes Gabapentin and Percocet with benefit and without adverse effect. She denies any other health changes. Her pain today is 7/10.    Physical Therapy: Yes, she did this in 2018 for one month (twice a week). She does HEP (stretching and ROM in the morning)    Non-pharmacologic Treatment:     · Ice/Heat: Heat is more helpful than ice  · TENS: Not tried  · Massage: Not tried  · Chiropractic care: Not tried  · Acupuncture: Not tried  · Other: Uses a cane         Pain Medications:         · Currently taking: Gabapentin, tylenol 500mg Q6 prn, Perocet    · Has tried in the past:  Ibuprofen (still takes sometimes), Voltaren gel    · Has not tried: Muscle relaxants, TCAs, SNRIs, Lyrica, compound topical creams    Blood thinners: None    Interventional Therapies: [id number] injections by Dr. Iyer were helpful  · 5/8/2018- Left knee Synvisc One injection  · 7/10/2018- Left genicular nerve block  · 7/31/2018- Left genicular cooled RFA  · 12/21/2018- Left knee steroid injection  · 1/31/2019- Left knee Euflexxa series  · 3/19/2019 Left genicular cooled RFA- 90% relief  · 7/19/19- Left knee genicular phenol chemodenervation-no relief    Relevant Surgeries: None    Affecting sleep? No    Affecting daily activities? Yes    Depressive symptoms? No          · SI/HI? No    Work status: No, on disability due to her knee    Prescription Monitoring Program database:  Reviewed and consistent with medication use as prescribed.      Last 3 PDI Scores 1/6/2020 10/4/2019 9/4/2019   Pain Disability Index (PDI) 38 0 70   ]    GENERAL: +chills No weight loss, malaise or fevers.  HEENT:   No recent changes in vision or hearing  NECK:  Negative for lumps, no difficulty with swallowing.  RESPIRATORY:  Negative for cough,  wheezing or shortness of breath, patient denies any recent URI.  CARDIOVASCULAR:  Negative for chest pain, leg swelling or palpitations.  GI:  Negative for abdominal discomfort, blood in stools or black stools or change in bowel habits.  MUSCULOSKELETAL:  See HPI.  SKIN:  Negative for lesions, rash, and itching.  PSYCH:  Negative for mood disorder or recent psychosocial stressors.    HEMATOLOGY/LYMPHOLOGY:  Negative for prolonged bleeding, bruising easily or swollen nodes.    ENDO: No history of diabetes or thyroid dysfunction  NEURO:   No history of headaches, syncope, paralysis, seizures or tremors.  All other reviewed and negative other than HPI.          Past Medical History:   Diagnosis Date    Anemia     2018    Arthritis     BMI 60.0-69.9, adult     Cataract     Depression     Dry eye syndrome     GERD (gastroesophageal reflux disease)     Glaucoma suspect     History of gastric ulcer     Hyperlipidemia     Hypertension     Hypothyroidism     Morbid obesity     Neuromuscular disorder     Thyroid disease        Past Surgical History:   Procedure Laterality Date    APPENDECTOMY      CATARACT EXTRACTION W/  INTRAOCULAR LENS IMPLANT Bilateral     MFIOL OU    cataract surgery   2017    INJECTION OF ANESTHETIC AGENT AROUND NERVE Left 7/10/2018    Procedure: BLOCK, NERVE;  Surgeon: Samantha Vidal MD;  Location: [facility];  Service: Pain Management;  Laterality: Left;  Genicular     INJECTION OF ANESTHETIC AGENT AROUND NERVE Left 7/19/2019    Procedure: Block, Nerve NERVE BLOCK GENICULAR WITH PHENOL 6%;  Surgeon: Samantha Vidal MD;  Location: [facility];  Service: Pain Management;  Laterality: Left;  NEEDS CONSENT       Review of patient's allergies indicates:  No Known Allergies    Current Outpatient Medications   Medication Sig Dispense Refill    acetaminophen (TYLENOL) 500 MG tablet       albuterol (PROAIR HFA) 90 mcg/actuation inhaler Inhale 2 puffs into the lungs every 6 (six)  hours as needed for Wheezing. Rescue 18 g 2    amLODIPine (NORVASC) 10 MG tablet Take 1 tablet (10 mg total) by mouth once daily. 90 tablet 1    atorvastatin (LIPITOR) 40 MG tablet Take 1 tablet (40 mg total) by mouth once daily. 90 tablet 1    B-complex with vitamin C (Z-BEC OR EQUIV) tablet Take 1 tablet by mouth once daily.       CALCIUM CITRATE-VITAMIN D2 ORAL Take 1 tablet by mouth once daily.       ferrous sulfate 220 mg (44 mg iron)/5 mL solution Take 220 mg by mouth once daily.      gabapentin (NEURONTIN) 300 MG capsule Take 2 capsules (600 mg total) by mouth 2 (two) times daily. 360 capsule 1    lisinopril (PRINIVIL,ZESTRIL) 40 MG tablet Take 1 tablet (40 mg total) by mouth once daily. 90 tablet 1    multivitamin with minerals tablet Take 1 tablet by mouth once daily.       nicotine (NICODERM CQ) 21 mg/24 hr Place 1 patch onto the skin once daily. 14 patch 0    nicotine polacrilex 2 MG Lozg Take 1 lozenge (2 mg total) by mouth as needed (Use in place of cigarettes). 144 lozenge 0    omega 3-dha-epa-fish oil 1,000 mg (120 mg-180 mg) Cap       oxyCODONE-acetaminophen (PERCOCET) 5-325 mg per tablet Take 1 tablet by mouth 2 (two) times daily. 60 tablet 0    pantoprazole (PROTONIX) 40 MG tablet Take 1 tablet (40 mg total) by mouth 2 (two) times daily. 90 tablet 1    sucralfate (CARAFATE) 1 gram tablet Take 1 tablet (1 g total) by mouth 4 (four) times daily before meals and nightly. 360 tablet 1     No current facility-administered medications for this visit.        Family History   Problem Relation Age of Onset    No Known Problems Mother     No Known Problems Father        Social History     Socioeconomic History    Marital status: Single     Spouse name: Not on file    Number of children: Not on file    Years of education: Not on file    Highest education level: Not on file   Occupational History    Not on file   Social Needs    Financial resource strain: Not on file    Food insecurity:  "    Worry: Not on file     Inability: Not on file    Transportation needs:     Medical: Not on file     Non-medical: Not on file   Tobacco Use    Smoking status: Current Every Day Smoker     Packs/day: 0.25     Years: 50.00     Pack years: 12.50     Types: Cigarettes     Last attempt to quit: 11/15/2018     Years since quittin.1    Smokeless tobacco: Never Used   Substance and Sexual Activity    Alcohol use: Not Currently    Drug use: No    Sexual activity: Not Currently     Partners: Male   Lifestyle    Physical activity:     Days per week: Not on file     Minutes per session: Not on file    Stress: Not on file   Relationships    Social connections:     Talks on phone: Not on file     Gets together: Not on file     Attends [employer] service: Not on file     Active member of club or organization: Not on file     Attends meetings of clubs or organizations: Not on file     Relationship status: Not on file   Other Topics Concern    Not on file   Social History Narrative    Not on file       Objective:     Vitals:    20 0906   BP: 127/77   Pulse: 71   Resp: 18   Temp: 98.7 °F (37.1 °C)   SpO2: 100%   Weight: (!) 169.8 kg (374 lb 5.5 oz)   Height: 5' 6" (1.676 m)   PainSc:   7   PainLoc: Knee       GEN:  Well developed, well nourished.  No acute distress. No pain behavior.  HEENT:  No trauma.  Mucous membranes moist.  Nares patent bilaterally.  PSYCH: Normal affect. Thought content appropriate.  CHEST:  Breathing symmetric.  No audible wheezing.  ABD:   · Soft, non-distended.    SKIN:  Warm, pink, dry.  No rash on exposed areas.    EXT:  No erythema noted TTP over lateral aspect of knee, 5/5 stregnth in BLE muscles groups, 2+ reflexes bilaterally  No color change or changes in nail or hair growth.  NEURO/MUSCULOSKELETAL:  Fully alert, oriented, and appropriate. Speech normal francesca. No cranial nerve deficits.   Gait: in wheelchair, guarding of the LLE  Motor Strength: 5/5 motor strength throughout " lower extremities.   Sensory: hyperesthesia mid left lateral thigh, small area of numbness left lateral knee. Negative straight leg raise bilaterally.     Left knee  Inspection:  No erythema or redness. No bruising.  ROM: Full ROM but with pain on flexion and extension.   Palpation: No pes anserine tenderness and positive crepitus. Pain with palpation over medial and lateral joint line of left knee. Difficult to assess ligamentous laxity due to habitus.       Imaging:      MRI Left Knee 7/21/2017 (in media):  The medial meniscus is intact. The lateral meniscus is intact.     A chronic complete ACL tear is noted. The posterior cruciate ligament is intact. The medial and lateral retinacula are intact. The medial collateral ligament is intact. The lateral collateral ligament in intact. The posterolateral corner structures are intact.     There is full thickness fissuring of the central aspect medial femoral cartilage. The lateral tibiofemoral compartment cartilage is intact. There is broad thickness defect within the central trochlear cartilage. There is mild lateral patellofemoral cartilage thinning and regional full thickness loss of the central superior patellar cartilage.     A small effusion is present. There is trace infrapatellar fluid. Tiny popliteal cyst is noted. Subtle focal marrow edema is seen within the posterior tibial plateau. The bone marrow signal is heterogeneous likely reflecting marrow reconversion.     There is trace pes anserine bursitis. The tendons are otherwise normal.     Grade 2 fatty streaking of the semimembranosus and vastus lateralis muscles are noted. There is mild prepatellar edema.     MRI Right Shoulder 7/21/2017 (in media):   There is a complete tear of the supraspinatus tendon with retraction to the glenohumeral joint. There is partial tearing of the anterior infraspinatus articular surface. Mild subscapularis tendinosis is noted. The teres minor is intact. A small amount of  subacromial/subdeltoid fluid is noted. The proximal long head biceps tendon is poorly visualized proximally although intact distally.     Degenerative tearing of the anterosuperior through posterosuperior labrum is noted. There is moderate to severe superior glenoid cartilage thinning with subchondral degenerative changes. The glenohumeral ligaments appear grossly intact.     An os acromiale is seen with fluid and osteophytosis at its junction with the base of the acromion. There is moderate superior subluxation of the AC joint with mild osteophytosis.     Small effusion with subcoracoid extension is noted.     There is grade 2/3 fatty atrophy of the supraspinatus muscle, grade 2 of the infraspinatus and subscapularis. Heterogeneous signal is seen throughout the bone marrow likely reflecting marrow reconversion.       Assessment:     Encounter Diagnoses   Name Primary?    Chronic pain disorder Yes    Primary osteoarthritis of left knee     Chronic pain of left knee     Encounter for monitoring opioid maintenance therapy        Plan:     Ca was seen today for follow-up.    Diagnoses and all orders for this visit:    Chronic pain disorder  -     gabapentin (NEURONTIN) 300 MG capsule; Take 2 capsules (600 mg total) by mouth 2 (two) times daily.    Primary osteoarthritis of left knee  -     gabapentin (NEURONTIN) 300 MG capsule; Take 2 capsules (600 mg total) by mouth 2 (two) times daily.    Chronic pain of left knee  -     gabapentin (NEURONTIN) 300 MG capsule; Take 2 capsules (600 mg total) by mouth 2 (two) times daily.    Encounter for monitoring opioid maintenance therapy       Her pain is consistent with the above.    We discussed the assessment and recommendations.  All available images were reviewed. We discussed the disease process, prognosis, treatment plan, and risks and benefits. The patient is aware of the risks and benefits of the medications being prescribed, common side effects, and proper  usage. The following is the plan we agreed on:     1. Unfortunately, knee pain has been refractory to interventional procedures. At this time, we have exhausted all options for interventional pain management.  2. Patient has tolerated opioids in the past and notes improved function and quality of life while taking opioids for pain for debilitating knee pain. She is a candidate for palliative care at this time.   3. Continue Percocet 5/325 mg BID PRN, #60. Refill sent today with appropriate date. She may call for 2 refills.   4. The patient is here today for a refill of current pain medications and they believe these provide effective pain control and improvements in quality of life.  The patient notes no serious side effects, and feels the benefits outweigh the risks.  The patient was reminded of the pain contract that they signed previously as well as the risks and benefits of the medication including possible death.  The updated [place] Board of [employer] prescription monitoring program was reviewed, and the patient has been found to be compliant with current treatment plan. Medication management provided by Dr. Vidal.  5. Pain Contract signed 9/4/2019.   6. UDS from 10/4/2019 reviewed and consistent.    7. Continue Gabapentin 600 mg BID. Refill provided today.   8. Patient does have some paresthesias in left knee which are expected with phenol chemodenervation.   9. Recommended patient followup with bariatric surgery clinic. Encouraged her to continue working on smoking cessation.   10. RTC in 3 months.    - Dr. Vidal was consulted on the patient and agrees with this plan.    The above plan and management options were discussed at length with patient. Patient is in agreement with the above and verbalized understanding.     Savanna Hyatt NP  01/06/2020

## 2020-01-07 NOTE — PROGRESS NOTES
Subjective:       Patient ID: Ca Coats is a 69 y.o. female.    Chief Complaint:  Well Woman      History of Present Illness  HPI  Annual Exam-Postmenopausal  Patient presents for annual exam. The patient has no complaints today. The patient is not currently sexually active. GYN screening history: last mammogram: was normal. The patient is not taking hormone replacement therapy. Patient denies post-menopausal vaginal bleeding. The patient wears seatbelts: yes. The patient participates in regular exercise: no. Has the patient ever been transfused or tattooed?: no. The patient reports that there is not domestic violence in her life.    Pt is in the process of qualifying for bariatric surgery.  Has recently stopped smoking.  Has tremendous difficulty ambulating due to her obesity.  GYN & OB History  No LMP recorded. Patient is postmenopausal.   Date of Last Pap: No result found    OB History    Para Term  AB Living   2 2 2         SAB TAB Ectopic Multiple Live Births                  # Outcome Date GA Lbr Casimiro/2nd Weight Sex Delivery Anes PTL Lv   2 Term            1 Term                Review of Systems  Review of Systems   Constitutional: Negative for activity change, appetite change and fatigue.   HENT: Negative.  Negative for tinnitus.    Eyes: Negative.    Respiratory: Negative for cough and shortness of breath.    Cardiovascular: Negative for chest pain and palpitations.   Gastrointestinal: Negative.  Negative for abdominal pain, blood in stool, constipation, diarrhea and nausea.   Endocrine: Negative.  Negative for hot flashes.   Genitourinary: Negative for dysmenorrhea, dyspareunia, dysuria, frequency, menstrual problem, pelvic pain, vaginal discharge, urinary incontinence and postcoital bleeding.   Musculoskeletal: Negative for back pain and joint swelling.   Integumentary:  Negative for rash, breast mass, nipple discharge and breast skin changes.   Neurological: Negative.   Negative for headaches.   Hematological: Negative.  Does not bruise/bleed easily.   Psychiatric/Behavioral: The patient is not nervous/anxious.    Breast: negative.  Negative for mass, nipple discharge and skin changes          Objective:    Physical Exam:   Constitutional: She is oriented to person, place, and time. She appears well-developed and well-nourished. No distress.    HENT:   Head: Normocephalic and atraumatic.    Eyes: Pupils are equal, round, and reactive to light. Conjunctivae and lids are normal.    Neck: Normal range of motion and full passive range of motion without pain. Neck supple.    Cardiovascular: Normal rate and regular rhythm.  Exam reveals no edema.     Pulmonary/Chest: Effort normal and breath sounds normal. She has no wheezes.        Abdominal: Soft. Normal appearance and bowel sounds are normal. She exhibits no distension. There is no tenderness. There is no rebound and no guarding.             Musculoskeletal: Normal range of motion and moves all extremeties.       Neurological: She is alert and oriented to person, place, and time. She has normal strength.    Skin: Skin is warm and dry.    Psychiatric: She has a normal mood and affect. Her speech is normal and behavior is normal. Thought content normal. Her mood appears not anxious. She does not exhibit a depressed mood. She expresses no suicidal plans and no homicidal plans.        pelvic exam deferred today b/c pt was unable to get on the exam table.  Pt aware that we will need to perform a pap at the next visit (she has been to 3 other appointments and was quite exhausted).  Assessment:        1. Well woman exam with routine gynecological exam    2. Encounter for screening mammogram for malignant neoplasm of breast                Plan:      Ca was seen today for well woman.    Diagnoses and all orders for this visit:    Well woman exam with routine gynecological exam  DEXA up to date.  Has low bone mass.  Will perform pap at  next visit.  Reviewed ASCCP guidelines for pap.  MMG up to date.  Given strict  bleeding precautions.    Encounter for screening mammogram for malignant neoplasm of breast  -     Mammo Digital Screening Bilat w/ [facility]; Future

## 2020-01-09 ENCOUNTER — TELEPHONE (OUTPATIENT)
Dept: SLEEP MEDICINE | Facility: CLINIC | Age: 70
End: 2020-01-09

## 2020-01-09 NOTE — TELEPHONE ENCOUNTER
----- Message from Yeni Rdz sent at 1/9/2020 11:45 AM CST -----  Contact: Self   Type: Patient Call Back    What is the request in detail: Pt calling to speak to a nurse regarding health.    Can the clinic reply by [facility]? No    Would the patient rather a call back or a response via [facility]? Call back     Best call back number: [phone]

## 2020-01-09 NOTE — TELEPHONE ENCOUNTER
Returned pt's call regarding her health. I was unable to understand pt's explanation of what was going on with supply's. Will call [facility] to get a better understanding.

## 2020-01-14 ENCOUNTER — TELEPHONE (OUTPATIENT)
Dept: INTERNAL MEDICINE | Facility: CLINIC | Age: 70
End: 2020-01-14

## 2020-01-14 DIAGNOSIS — M25.562 CHRONIC PAIN OF LEFT KNEE: ICD-10-CM

## 2020-01-14 DIAGNOSIS — G89.29 CHRONIC PAIN OF LEFT KNEE: ICD-10-CM

## 2020-01-14 DIAGNOSIS — G89.4 CHRONIC PAIN DISORDER: ICD-10-CM

## 2020-01-14 DIAGNOSIS — M17.12 PRIMARY OSTEOARTHRITIS OF LEFT KNEE: ICD-10-CM

## 2020-01-14 PROBLEM — M85.852 OSTEOPENIA OF NECK OF LEFT FEMUR: Status: ACTIVE | Noted: 2020-01-14

## 2020-01-14 RX ORDER — OXYCODONE AND ACETAMINOPHEN 5; 325 MG/1; MG/1
1 TABLET ORAL 2 TIMES DAILY
Qty: 60 TABLET | Refills: 0 | OUTPATIENT
Start: 2020-01-15 | End: 2020-02-14

## 2020-01-14 NOTE — TELEPHONE ENCOUNTER
----- Message from Deandra Barron sent at 1/14/2020 10:30 AM CST -----  Contact: LU RENAE [[id number]]      MEDICATION  REFILL  REQUEST      Please refill the medication(s) listed below. Please call the patient when the prescription(s) is ready for  at this phone number   [phone]      Medication #1   oxyCODONE-acetaminophen (PERCOCET) 5-325 mg per tablet        Preferred Pharmacy:  [address]     [phone] (Phone)  [phone] (Fax)

## 2020-01-14 NOTE — TELEPHONE ENCOUNTER
This is a patient for Dr. Vidal, could you please assist with her refill?      Patient requesting refill on Oxycodone-acetaminophen (PERCOCET) 5-325 mg per tablet    Last office visit 01/06/2020     shows last refill on 12/16/2019    Patient does have a pain contract on file with [facility] department    Patient last UDS 10/04/2019 was consistent with current therapy    Please review.  Thanks

## 2020-01-15 NOTE — TELEPHONE ENCOUNTER
Called pt and informed her their bone density scan revealed that they have osteopenia, which means your bones are thinner than they should be and can put you at risk for fractures if you were to fall or have even minor trauma. There are lifestyle changes that I  recommend to help improve bone density. Some treatment options to help strengthen your bones and prevent more bone loss are: regular exercise (at least 3 times per week for about 30 minutes at a time is recommended). If you already eat a healthy diet with plenty of fruits and vegetables you probably get enough calcium. If your diet does not include a good amount of produce, I would recommend taking a calcium supplement of 1200 mg daily. I would also recommend taking 800 units of vitamin D daily. Typically there is a combination vitamin of calcium and vitamin D that you can purchase at pharmacies.      We can discus more in the office when I see her in March.     pt showed verbal understanding

## 2020-01-15 NOTE — TELEPHONE ENCOUNTER
----- Message from Betty Bains LPN sent at 1/10/2020  4:39 PM CST -----      ----- Message -----  From: Josep Chung MD  Sent: 1/10/2020   4:09 PM CST  To: , #    Please address with patient and forward to new provider

## 2020-01-15 NOTE — TELEPHONE ENCOUNTER
Please call patient and inform that their bone density scan revealed that they have osteopenia, which means your bones are thinner than they should be and can put you at risk for fractures if you were to fall or have even minor trauma. There are lifestyle changes that I  recommend to help improve bone density. Some treatment options to help strengthen your bones and prevent more bone loss are: regular exercise (at least 3 times per week for about 30 minutes at a time is recommended). If you already eat a healthy diet with plenty of fruits and vegetables you probably get enough calcium. If your diet does not include a good amount of produce, I would recommend taking a calcium supplement of 1200 mg daily. I would also recommend taking 800 units of vitamin D daily. Typically there is a combination vitamin of calcium and vitamin D that you can purchase at pharmacies.     We can discus more in the office when I see her in March.    Thanks!

## 2020-01-16 ENCOUNTER — TELEPHONE (OUTPATIENT)
Dept: SLEEP MEDICINE | Facility: CLINIC | Age: 70
End: 2020-01-16

## 2020-01-16 ENCOUNTER — TELEPHONE (OUTPATIENT)
Dept: INTERNAL MEDICINE | Facility: CLINIC | Age: 70
End: 2020-01-16

## 2020-01-16 NOTE — TELEPHONE ENCOUNTER
This is a prior patient of Dr. Chung.  Please let her know her recent tests have been reviewed:    CT lung cancer screening test: normal and negative for any sign of cancer, repeat this test in 1 year    Mammogram: normal and negative, repeat this test in 1 year    DEXA scan: is showing osteopenia, alcohol/tobacco avoidance, calcium/vitamin D supplement, and routine exercise recommended, repeat this test in 3 years    Please help patient to schedule a visit with a new provider to establish care and review these results in more detail, thank you.

## 2020-01-16 NOTE — TELEPHONE ENCOUNTER
----- Message from Jorge Jackson sent at 1/16/2020 10:19 AM CST -----  Contact: LU RENAE [[id number]]  Name of Who is Calling: LU RENAE [[id number]]      What is the request in detail: Would like to speak with staff in regards to following up on previous conversation last week. States staff was suppose to call her the next day and it just not right she has to wait this long, when she is having a problem. Please advise    Can the clinic reply by [facility]: no      What Number [facility]: [phone]

## 2020-01-17 ENCOUNTER — TELEPHONE (OUTPATIENT)
Dept: SLEEP MEDICINE | Facility: CLINIC | Age: 70
End: 2020-01-17

## 2020-01-17 NOTE — TELEPHONE ENCOUNTER
Returned pt's call regarding cpap order. I notified pt that I sent Order, sleep study, and progress not from [facility] from 2013 to [facility]. I also advised pt that there may still be an issue through [facility] due to them not having data from her using cpap. Pt is aware that all documentation needed has been sent to [facility].

## 2020-01-17 NOTE — TELEPHONE ENCOUNTER
I called [facility] and spoke with Jerri to try and figure out what was needed for pt due to me not being able to understand what pt was saying. Jerri stated that the pt told them she needed documents for her her new Doctor. [facility] told the patient to contact her doctor's office. I called pt back to get a better understanding of what she needed pt stated that she just needed supply's. I did tell pt that I will call [facility] to let them know that the pt just needed a refill on supply's. After speaking with someone that works in the supply dept. It was noted that pt wasn't using her machine and the missing document was f2f visit from 2013. Called pt to let her know.

## 2020-01-17 NOTE — TELEPHONE ENCOUNTER
----- Message from Cristiana Andrews sent at 1/17/2020 11:14 AM CST -----  Contact: Pt   Name of Who is Calling: LU RENAE [[id number]]    What is the request in detail:Pt is requesting orders for cpap supply's ...... Please contact to further discuss and advise      Can the clinic reply by [facility]: No     What [facility]:  [phone] (home)

## 2020-01-28 ENCOUNTER — TELEPHONE (OUTPATIENT)
Dept: BARIATRICS | Facility: CLINIC | Age: 70
End: 2020-01-28

## 2020-01-28 NOTE — TELEPHONE ENCOUNTER
----- Message from Carola Sandoval sent at 1/21/2020 10:29 AM CST -----  Regarding: nicotine  According to smoking cess she quit smoking-Patient is tobacco free since 1/2/19.      I believe the selection notes say if she is negative on nicotine test she can resume work up

## 2020-01-28 NOTE — TELEPHONE ENCOUNTER
Spoke with pt, scheduled nicotine lab and return with Myrtle Monday 1/28/2020. Patient aware and verbalized understanding.

## 2020-01-30 ENCOUNTER — PATIENT OUTREACH (OUTPATIENT)
Dept: ADMINISTRATIVE | Facility: OTHER | Age: 70
End: 2020-01-30

## 2020-02-03 ENCOUNTER — CLINICAL SUPPORT (OUTPATIENT)
Dept: BARIATRICS | Facility: CLINIC | Age: 70
End: 2020-02-03
Payer: MEDICARE

## 2020-02-03 ENCOUNTER — LAB VISIT (OUTPATIENT)
Dept: LAB | Facility: HOSPITAL | Age: 70
End: 2020-02-03
Payer: MEDICARE

## 2020-02-03 VITALS — BODY MASS INDEX: 61.91 KG/M2 | WEIGHT: 293 LBS

## 2020-02-03 DIAGNOSIS — G47.33 OBSTRUCTIVE SLEEP APNEA: ICD-10-CM

## 2020-02-03 DIAGNOSIS — Z72.0 TOBACCO ABUSE: ICD-10-CM

## 2020-02-03 DIAGNOSIS — E66.01 MORBID OBESITY WITH BODY MASS INDEX (BMI) OF 60.0 TO 69.9 IN ADULT: ICD-10-CM

## 2020-02-03 DIAGNOSIS — Z71.3 NUTRITIONAL COUNSELING: ICD-10-CM

## 2020-02-03 DIAGNOSIS — I10 ESSENTIAL HYPERTENSION: ICD-10-CM

## 2020-02-03 PROCEDURE — 99999 PR PBB SHADOW E&M-EST. PATIENT-LVL II: ICD-10-PCS | Mod: PBBFAC,HCNC,, | Performed by: DIETITIAN, REGISTERED

## 2020-02-03 PROCEDURE — 99499 NO LOS: ICD-10-PCS | Mod: HCNC,S$GLB,, | Performed by: DIETITIAN, REGISTERED

## 2020-02-03 PROCEDURE — 97803 MED NUTRITION INDIV SUBSEQ: CPT | Mod: HCNC,S$GLB,, | Performed by: DIETITIAN, REGISTERED

## 2020-02-03 PROCEDURE — 99999 PR PBB SHADOW E&M-EST. PATIENT-LVL II: CPT | Mod: PBBFAC,HCNC,, | Performed by: DIETITIAN, REGISTERED

## 2020-02-03 PROCEDURE — 36415 COLL VENOUS BLD VENIPUNCTURE: CPT | Mod: HCNC

## 2020-02-03 PROCEDURE — 80323 ALKALOIDS NOS: CPT | Mod: HCNC

## 2020-02-03 PROCEDURE — 97803 PR MED NUTR THER, SUBSQ, INDIV, EA 15 MIN: ICD-10-PCS | Mod: HCNC,S$GLB,, | Performed by: DIETITIAN, REGISTERED

## 2020-02-03 PROCEDURE — 99499 UNLISTED E&M SERVICE: CPT | Mod: HCNC,S$GLB,, | Performed by: DIETITIAN, REGISTERED

## 2020-02-03 NOTE — PATIENT INSTRUCTIONS
"Snacks: (100-200 calories; >5g protein)    - 1 low-fat cheese stick with 8 cherry tomatoes or 1 serving fresh fruit  - 4 thin slices fat-free turkey breast and 1 slice low-fat cheese  - 4 thin slices fat-free honey ham with wedge of melon  - 2 slices of turkey vinson  - Boiled eggs (can buy at [employer] already boiled w/ shell removed)  - for convenience,  [place] read, snack, go (deli meat and cheese rolls)  - P3 packets (Protein packs w/ cheese, nuts, lean deli meat)  - MHP Fit and Lean Protein Pudding (find at [employer] - per 1 cup serving = 100 calories, 15 g protein, 0 g sugar)  - 6-8 edamame pods (equivalent to about 1/4 cup edamame without pods).   - 1/4 cup unsalted nuts with ½ cup fruit  - 6-oz container Dannon Light n Fit vanilla yogurt, topped with 1oz unsalted nuts         - apple, celery or baby carrots spread with 2 Tbsp PB2  - apple slices with 1 oz slice low-fat cheese  - Apple slices dipped in 2 Tbsp of PB2  - 2 Tbsp PB2 mixed in light or greek yogurt or sugar-free pudding  - celery, cucumber, bell pepper or baby carrots dipped in ¼ cup hummus bean spread   - celery, cucumber, baby carrots dipped in high protein greek yogurt (Mix 16 oz plain greek yogurt + 1 packet of [place] ranch dip mix)  - Mike Links Beef Steak - 14g protein! (similar to beef jerky but very lean)  - 2 wedges Laughing Cow - Light Herb & Garlic Cheese with sliced cucumber or green bell pepper  - 1/2 cup low-fat cottage cheese with ¼ cup fruit or ¼ cup salsa  - 1/2 cup low fat cottage cheese with 10-15 cherry tomatoes  - 8 oz glass of FAIRLIFE fat free milk (13 g protein)  - 8 oz glass of FAIRLIFE fat free milk + 1 packet of sugar-free hot cocoa  - Add Atkins advantage Cafe Caramel shake to decaf coffee. Serve hot or blend with ice for "frappaccino" like drink  - RTD Protein drinks: Atkins, Low Carb Slim Fast, EAS light, Muscle Milk Light, etc.  - Homemade Protein drinks: GNC Soy95, Isopure, Nectar, UNJURY, Whey " Gourmet, etc. Mix 1 scoop powder with 8oz skim/1% milk or light soymilk.  - Protein bars: Atkins, EAS, Pure Protein,  Quest, Think Thin, Detour, etc. Must have 0-4 grams sugar - Read the label.    ** Be CREATIVE. You can always snack on bites of grilled chicken or tuna salad made with low fat bell, if needed!   Reviewed lists of FOODS TO AVOID

## 2020-02-03 NOTE — PROGRESS NOTES
NUTRITION NOTE    Referring Physician: Irina Cleveland MD  Reason for MNT Referral: 3 months Medically Supervised Diet pending Gastric Bypass    Patient presents for 6 visit for MSD with weight gain over the past month; total weight loss by making numerous dietary and lifestyle changes.  Pt quit smoking on Jan 2, 2020 and reports that by doing so she has gained weight.  She is no longer in an assisted living facility and is living at home  CLINICAL DATA:  69 y.o. female.    Current Weight: 383.5 lbs  Weight Change Since Initial Visit: Gain of 16.5  lbs  Ideal Body Weight: 141 lbs  Body mass index is 61.91 kg/m².   Last Weight: 367 lbs        DAILY NUTRITIONAL NEEDS:  2233 x1.2 activity factor = 2680 - 1000 for wt loss = 1680 Calories (using [place] [address] Jeor Equation)   Grams Protein (1.5-1.8 gm/kg IBW)        CURRENT DIET:    Diet Recall: Food records are not present.  Breakfast: coffee ff creamer sweetnlow  or 2%miilk  Lunch: Ham sandwich bell on wheat bread cucumber and tomatoes  Dinner: Salad cucumber crab or tuna or boiled eggs  Snack:  Spoon of peanut butter      Diet Includes: 2 meals and 1 snack  Meal Pattern: Irregular  Protein Supplements: 0 per day.  Snacking: Adequate. Snacks include peanut butter  Vegetables: Likes a variety. Eats almost daily.  Fruits: Likes a few. Eats almost daily. banana  Beverages: water coffee without sugar and 2% milk  Dining Out:  Never. Mostly none.  Cooking at home: Daily. Mostly baked meat and vegetables.     CURRENT EXERCISE:  Get up and walk by herself in her home.  Back in home.  Had PT for 1 month after she moved home but has been d/c'd.  Pt used wheelchair during visit     Vitamins / Minerals / Herbs:   B complex with vitamin C  Iron   Multivitamin     Food Allergies:   None reported     Social:  Disabled.  Alcohol: None.  Smoking: Jan 2nd quit smoking completely      PLAN:  Pt is potential candidate for surgery. Pt needs to lose down to 360 lbs.    Diet:  Adjust diet plan.  Work on gradually cutting back on starchy CHO in the diet.  1200-calorie diet.  5-6 meals per day.  Start including protein supplements in the diet plan daily.  Return to clinic.    Exercise: Increase.    Behavior Modification: Begin to document food & activity logs daily.    Pt needs to get below 360 lbs prior to surgery    Return to clinic in one month.  Needs additional visit(s) with RD.    Communicated nutrition plan with bariatric team.    SESSION TIME:  30 minutes

## 2020-02-05 LAB
COTININE SERPL-MCNC: 4.3 NG/ML
NICOTINE SERPL-MCNC: <3 NG/ML

## 2020-02-11 ENCOUNTER — TELEPHONE (OUTPATIENT)
Dept: INTERNAL MEDICINE | Facility: CLINIC | Age: 70
End: 2020-02-11

## 2020-02-11 ENCOUNTER — CLINICAL SUPPORT (OUTPATIENT)
Dept: SMOKING CESSATION | Facility: CLINIC | Age: 70
End: 2020-02-11
Payer: COMMERCIAL

## 2020-02-11 DIAGNOSIS — F17.210 CIGARETTE NICOTINE DEPENDENCE, UNCOMPLICATED: Primary | ICD-10-CM

## 2020-02-11 PROCEDURE — 99407 BEHAV CHNG SMOKING > 10 MIN: CPT | Mod: S$GLB,,,

## 2020-02-11 PROCEDURE — 99999 PR PBB SHADOW E&M-EST. PATIENT-LVL I: CPT | Mod: PBBFAC,,,

## 2020-02-11 PROCEDURE — 99407 PR TOBACCO USE CESSATION INTENSIVE >10 MINUTES: ICD-10-PCS | Mod: S$GLB,,,

## 2020-02-11 PROCEDURE — 99999 PR PBB SHADOW E&M-EST. PATIENT-LVL I: ICD-10-PCS | Mod: PBBFAC,,,

## 2020-02-11 NOTE — TELEPHONE ENCOUNTER
----- Message from Jennifer Ayers sent at 2/11/2020 12:42 PM CST -----  Contact: Self            Name of Who is Calling: LU RENAE [[id number]]      What is the request in detail: Pt has an appt on 03/10, but states she needs an RX for her iron pills before the appt. Pt is a previous pt of Dr. Chung. Please contact to further discuss and advise.        Can the clinic reply by [facility]: N      What Number to Call Back [facility]: [phone]

## 2020-02-12 DIAGNOSIS — D50.9 IRON DEFICIENCY ANEMIA, UNSPECIFIED IRON DEFICIENCY ANEMIA TYPE: Primary | ICD-10-CM

## 2020-02-12 NOTE — TELEPHONE ENCOUNTER
Ms. Coats states that she need a refill on her Iron pills but I do not see where this patient has been taking pill on her medication list.  Only Iron I see is the Solution and patient states that she do not take the solution.

## 2020-02-13 ENCOUNTER — TELEPHONE (OUTPATIENT)
Dept: PAIN MEDICINE | Facility: CLINIC | Age: 70
End: 2020-02-13

## 2020-02-13 DIAGNOSIS — M17.12 PRIMARY OSTEOARTHRITIS OF LEFT KNEE: ICD-10-CM

## 2020-02-13 DIAGNOSIS — G89.4 CHRONIC PAIN DISORDER: ICD-10-CM

## 2020-02-13 DIAGNOSIS — G89.29 CHRONIC PAIN OF LEFT KNEE: ICD-10-CM

## 2020-02-13 DIAGNOSIS — M25.562 CHRONIC PAIN OF LEFT KNEE: ICD-10-CM

## 2020-02-13 RX ORDER — OXYCODONE AND ACETAMINOPHEN 5; 325 MG/1; MG/1
1 TABLET ORAL 2 TIMES DAILY
Qty: 60 TABLET | Refills: 0 | Status: SHIPPED | OUTPATIENT
Start: 2020-02-13 | End: 2020-03-12 | Stop reason: SDUPTHER

## 2020-02-13 NOTE — TELEPHONE ENCOUNTER
I also only see the solution. If she knows what specific iron pill and dose she is taking, we can refill. Otherwise, it's ok to wait to est care with a new PCP but needs appt with someone within 6-8 weeks. Please assist in scheduling.

## 2020-02-13 NOTE — TELEPHONE ENCOUNTER
Staff returned call to Irina at [facility].    Tech stated that they are busy and needed staff to leave a message.

## 2020-02-13 NOTE — TELEPHONE ENCOUNTER
Patient requesting refill on oxycodone-acetaminophen (PERCOCET) 5-325 mg per tablet    Last office visit 01/06/2020     shows last refill on 01/15/2020    Patient does have a pain contract on file with [facility]    Patient last UDS 10/04/2019 was consistent with current therapy    Please review.  Thanks

## 2020-02-13 NOTE — TELEPHONE ENCOUNTER
----- Message from Cristiana Andrews sent at 2/13/2020  1:01 PM CST -----  Contact: Pt   Can the clinic reply in [facility]: No     Please refill the medication(s) listed below. The patient can be reached at this phone number [phone] (home)  once it is called into the pharmacy.    Medication #1oxyCODONE-acetaminophen (PERCOCET) 5-325 mg per tablet    Medication #2    Preferred Pharmacy:[facility] [address] [place] [address]

## 2020-02-13 NOTE — TELEPHONE ENCOUNTER
----- Message from Barbara Huggins sent at 2/13/2020  4:19 PM CST -----  Contact: Irina with [facility]  Name of Who is Calling: Irina ortiz [facility]    What is the request in detail: is needing additional information on the Rx for Percocet in order to fill the Rx.  Please contact to further discuss and advise      Can the clinic reply by [facility]: no    What Number to Call Back if not in [facility]: [phone]

## 2020-02-14 ENCOUNTER — TELEPHONE (OUTPATIENT)
Dept: PAIN MEDICINE | Facility: CLINIC | Age: 70
End: 2020-02-14

## 2020-02-14 RX ORDER — FERROUS GLUCONATE 324(37.5)
324 TABLET ORAL
Qty: 90 TABLET | Refills: 0 | COMMUNITY
Start: 2020-02-14 | End: 2020-03-10 | Stop reason: SDUPTHER

## 2020-02-14 NOTE — TELEPHONE ENCOUNTER
"Staff returned a call to [facility] pharmacy staff member Irina.    Irina stated," Being that we are using the same diagnosis code for this patient I need to update any new information on her treatment plan. Last time I spoke to Dr. Vidal she stated she was waiting for the patient to have weigh loss surgery."    Staff informed Irina of the following:    -All interventional options have been tried on patient. None really worked.   -Patient is a candidate for palliative care  -Our records do not show that the patient has had her weight loss surgery as of yet.  -No recent MRI's have been had by the patient.     Irina states," That is all thank you so much I will document all this for her. Thank you for your assistance."    Staff verbalized understanding.         "

## 2020-02-14 NOTE — TELEPHONE ENCOUNTER
----- Message from Deneen Shah sent at 2/14/2020  8:34 AM CST -----  Contact: Irina ([facility])  Name of Who is Calling : Irina ([facility])    Patient is returning a call from staff in regards to medication   .....Please contact to further discuss and advise.    Can the clinic reply by [facility] : No    What Number to Call Back :  [phone]

## 2020-02-17 ENCOUNTER — DOCUMENTATION ONLY (OUTPATIENT)
Dept: BARIATRICS | Facility: CLINIC | Age: 70
End: 2020-02-17

## 2020-02-17 NOTE — PROGRESS NOTES
2/17/2020- selection - nicotine and cotinine needs to be negative; can only have a sleeve, needs to lose 10 lbs; will see dietitian for 3 months and nicotine testing at each visit; Edel writing contract outlining pt's responsibility; Christine to call patient to discuss prior to her visit.

## 2020-02-18 ENCOUNTER — TELEPHONE (OUTPATIENT)
Dept: BARIATRICS | Facility: CLINIC | Age: 70
End: 2020-02-18

## 2020-02-18 DIAGNOSIS — Z87.891 FORMER SMOKER: Primary | ICD-10-CM

## 2020-02-18 NOTE — TELEPHONE ENCOUNTER
"2/17/2020- selection - nicotine and cotinine needs to be negative; can only have a sleeve, needs to lose 10 lbs (get to 273 lb); will see dietitian for 3 months and nicotine testing at each visit; Edel writing contract outlining pt's responsibility; Christine to call patient to discuss prior to her visit.    Called patient and explained recommendations.    She listened to recommendations then became upset; she stated that she only gained wt because she had stopped smoking ( she confers that she is not around second hand smoke, she denies nicotine use of any form).  Patient said " you are giving me the run around- if ya'll don't want to do surgery, let me know and I will find someone else"  Notified patient that we are willing to do her surgery;however, the recommendations are to improve her safety and outcomes.  At the end of the conversation, patient kept her appt; orders added and pt was scheduled for nicotine level.    "

## 2020-03-03 ENCOUNTER — LAB VISIT (OUTPATIENT)
Dept: LAB | Facility: HOSPITAL | Age: 70
End: 2020-03-03
Attending: SURGERY
Payer: MEDICARE

## 2020-03-03 ENCOUNTER — CLINICAL SUPPORT (OUTPATIENT)
Dept: BARIATRICS | Facility: CLINIC | Age: 70
End: 2020-03-03
Payer: MEDICARE

## 2020-03-03 VITALS — WEIGHT: 293 LBS | BODY MASS INDEX: 62.98 KG/M2

## 2020-03-03 DIAGNOSIS — G47.33 OBSTRUCTIVE SLEEP APNEA: ICD-10-CM

## 2020-03-03 DIAGNOSIS — I10 ESSENTIAL HYPERTENSION: ICD-10-CM

## 2020-03-03 DIAGNOSIS — E66.01 MORBID OBESITY WITH BODY MASS INDEX (BMI) OF 60.0 TO 69.9 IN ADULT: ICD-10-CM

## 2020-03-03 DIAGNOSIS — Z87.891 FORMER SMOKER: ICD-10-CM

## 2020-03-03 DIAGNOSIS — Z71.3 NUTRITIONAL COUNSELING: ICD-10-CM

## 2020-03-03 PROCEDURE — 97803 MED NUTRITION INDIV SUBSEQ: CPT | Mod: HCNC,S$GLB,, | Performed by: DIETITIAN, REGISTERED

## 2020-03-03 PROCEDURE — 99499 UNLISTED E&M SERVICE: CPT | Mod: HCNC,S$GLB,, | Performed by: DIETITIAN, REGISTERED

## 2020-03-03 PROCEDURE — 99999 PR PBB SHADOW E&M-EST. PATIENT-LVL I: CPT | Mod: PBBFAC,HCNC,, | Performed by: DIETITIAN, REGISTERED

## 2020-03-03 PROCEDURE — 36415 COLL VENOUS BLD VENIPUNCTURE: CPT | Mod: HCNC

## 2020-03-03 PROCEDURE — 99499 NO LOS: ICD-10-PCS | Mod: HCNC,S$GLB,, | Performed by: DIETITIAN, REGISTERED

## 2020-03-03 PROCEDURE — 80323 ALKALOIDS NOS: CPT | Mod: HCNC

## 2020-03-03 PROCEDURE — 99999 PR PBB SHADOW E&M-EST. PATIENT-LVL I: ICD-10-PCS | Mod: PBBFAC,HCNC,, | Performed by: DIETITIAN, REGISTERED

## 2020-03-03 PROCEDURE — 97803 PR MED NUTR THER, SUBSQ, INDIV, EA 15 MIN: ICD-10-PCS | Mod: HCNC,S$GLB,, | Performed by: DIETITIAN, REGISTERED

## 2020-03-03 NOTE — PROGRESS NOTES
NUTRITION NOTE    Referring Physician: Irina Cleveland MD  Reason for MNT Referral: Medically Supervised Diet pending Gastric Sleeve    Patient presents for 7th visit for MSD with weight loss over the past month; Pt feels like she is hungrier since she quit smoking 2 months ago.  Pt reports that she is frustrated with weight gain and will see Dr. Rollins for possible wt loss medication.    CLINICAL DATA:  69 y.o. female.    Current Weight: 390 lbs  Weight Change Since Initial Visit: Gain of 23 lbs  Gain of 6.5 lbs since last visit  Ideal Body Weight: 141 lbs  Body mass index is 62.98 kg/m².   Last Weight: 383.5 lbs        DAILY NUTRITIONAL NEEDS:  2233 x1.2 activity factor = 2680 - 1000 for wt loss = 1680 Calories (using [place] [address] Jeor Equation)   Grams Protein (1.5-1.8 gm/kg IBW)        CURRENT DIET:     Diet Recall: Food records are not present.  Breakfast: coffee ff creamer sweetnlow    3-4: banana and cheese  730: chicken or salad with lettuce and eggs   Before bed  occ peanut butter     Diet Includes: 2 meals and -2 snack  Meal Pattern: Irregular  Protein Supplements: 0 per day.  Has them however is reluctant to use them due to financial concerns  Snacking: Adequate. Snacks include peanut butter  Vegetables: Likes a variety. Eats almost daily.  Fruits: Likes a few. Eats almost daily. banana  Beverages: water coffee without sugar and sugar-free jello  Dining Out:  Never. Mostly none.  Cooking at home: Daily. Mostly baked meat and vegetables.     CURRENT EXERCISE:  Pt used wheelchair during visit  Walks with in house with cane     Vitamins / Minerals / Herbs:   B complex with vitamin C  Iron   Multivitamin     Food Allergies:   None reported     Social:  Disabled.  Alcohol: None.  Smoking: Jan 2nd quit smoking completely       Doing fairly well with working on greatest challenges (starchy CHO).    Barriers to Education:  none  Stage of Change:  action    NUTRITION DIAGNOSIS:  Morbid Obesity  related to Inadequate protein intake and Physical inactivity as evidence by BMI.  Status: Same    PLAN:  Pt is potential  candidate for surgery.    Diet: Adjust diet plan.  5-6 meals per day.  Start including protein supplements in the diet plan daily.    Exercise: As tolerated.    Behavior Modification: Begin to document food & activity logs daily.        Return to clinic in one month.  Needs additional visit(s) with RD.    Communicated nutrition plan with bariatric team.    SESSION TIME:  30 minutes

## 2020-03-05 LAB
COTININE SERPL-MCNC: <3 NG/ML
NICOTINE SERPL-MCNC: <3 NG/ML

## 2020-03-09 ENCOUNTER — DOCUMENTATION ONLY (OUTPATIENT)
Dept: BARIATRICS | Facility: CLINIC | Age: 70
End: 2020-03-09

## 2020-03-10 ENCOUNTER — OFFICE VISIT (OUTPATIENT)
Dept: INTERNAL MEDICINE | Facility: CLINIC | Age: 70
End: 2020-03-10
Payer: MEDICARE

## 2020-03-10 ENCOUNTER — TELEPHONE (OUTPATIENT)
Dept: BARIATRICS | Facility: CLINIC | Age: 70
End: 2020-03-10

## 2020-03-10 ENCOUNTER — CLINICAL SUPPORT (OUTPATIENT)
Dept: SMOKING CESSATION | Facility: CLINIC | Age: 70
End: 2020-03-10
Payer: COMMERCIAL

## 2020-03-10 VITALS
SYSTOLIC BLOOD PRESSURE: 142 MMHG | HEIGHT: 66 IN | BODY MASS INDEX: 47.09 KG/M2 | DIASTOLIC BLOOD PRESSURE: 90 MMHG | HEART RATE: 78 BPM | OXYGEN SATURATION: 98 % | WEIGHT: 293 LBS

## 2020-03-10 DIAGNOSIS — K21.9 GASTROESOPHAGEAL REFLUX DISEASE, ESOPHAGITIS PRESENCE NOT SPECIFIED: ICD-10-CM

## 2020-03-10 DIAGNOSIS — D64.9 NORMOCYTIC ANEMIA: ICD-10-CM

## 2020-03-10 DIAGNOSIS — E66.01 MORBID OBESITY WITH BMI OF 60.0-69.9, ADULT: ICD-10-CM

## 2020-03-10 DIAGNOSIS — F17.200 TOBACCO SMOKER WITHIN LAST 12 MONTHS: ICD-10-CM

## 2020-03-10 DIAGNOSIS — Z76.89 ENCOUNTER TO ESTABLISH CARE WITH NEW DOCTOR: Primary | ICD-10-CM

## 2020-03-10 DIAGNOSIS — E78.2 MIXED HYPERLIPIDEMIA: ICD-10-CM

## 2020-03-10 DIAGNOSIS — G47.33 OBSTRUCTIVE SLEEP APNEA: ICD-10-CM

## 2020-03-10 DIAGNOSIS — F17.210 CIGARETTE NICOTINE DEPENDENCE, UNCOMPLICATED: Primary | ICD-10-CM

## 2020-03-10 DIAGNOSIS — I10 ESSENTIAL HYPERTENSION: ICD-10-CM

## 2020-03-10 PROCEDURE — 1126F PR PAIN SEVERITY QUANTIFIED, NO PAIN PRESENT: ICD-10-PCS | Mod: HCNC,S$GLB,, | Performed by: FAMILY MEDICINE

## 2020-03-10 PROCEDURE — 1101F PR PT FALLS ASSESS DOC 0-1 FALLS W/OUT INJ PAST YR: ICD-10-PCS | Mod: HCNC,CPTII,S$GLB, | Performed by: FAMILY MEDICINE

## 2020-03-10 PROCEDURE — 99999 PR PBB SHADOW E&M-EST. PATIENT-LVL I: ICD-10-PCS | Mod: PBBFAC,,,

## 2020-03-10 PROCEDURE — 1159F PR MEDICATION LIST DOCUMENTED IN MEDICAL RECORD: ICD-10-PCS | Mod: HCNC,S$GLB,, | Performed by: FAMILY MEDICINE

## 2020-03-10 PROCEDURE — 99402 PREV MED CNSL INDIV APPRX 30: CPT | Mod: S$GLB,,,

## 2020-03-10 PROCEDURE — 99999 PR PBB SHADOW E&M-EST. PATIENT-LVL I: CPT | Mod: PBBFAC,,,

## 2020-03-10 PROCEDURE — 99499 RISK ADDL DX/OHS AUDIT: ICD-10-PCS | Mod: S$PBB,HCNC,, | Performed by: FAMILY MEDICINE

## 2020-03-10 PROCEDURE — 99402 PR PREVENT COUNSEL,INDIV,30 MIN: ICD-10-PCS | Mod: S$GLB,,,

## 2020-03-10 PROCEDURE — 3080F DIAST BP >= 90 MM HG: CPT | Mod: HCNC,CPTII,S$GLB, | Performed by: FAMILY MEDICINE

## 2020-03-10 PROCEDURE — 99214 PR OFFICE/OUTPT VISIT, EST, LEVL IV, 30-39 MIN: ICD-10-PCS | Mod: HCNC,S$GLB,, | Performed by: FAMILY MEDICINE

## 2020-03-10 PROCEDURE — 3077F SYST BP >= 140 MM HG: CPT | Mod: HCNC,CPTII,S$GLB, | Performed by: FAMILY MEDICINE

## 2020-03-10 PROCEDURE — 3080F PR MOST RECENT DIASTOLIC BLOOD PRESSURE >= 90 MM HG: ICD-10-PCS | Mod: HCNC,CPTII,S$GLB, | Performed by: FAMILY MEDICINE

## 2020-03-10 PROCEDURE — 99499 UNLISTED E&M SERVICE: CPT | Mod: S$PBB,HCNC,, | Performed by: FAMILY MEDICINE

## 2020-03-10 PROCEDURE — 1159F MED LIST DOCD IN RCRD: CPT | Mod: HCNC,S$GLB,, | Performed by: FAMILY MEDICINE

## 2020-03-10 PROCEDURE — 1101F PT FALLS ASSESS-DOCD LE1/YR: CPT | Mod: HCNC,CPTII,S$GLB, | Performed by: FAMILY MEDICINE

## 2020-03-10 PROCEDURE — 99999 PR PBB SHADOW E&M-EST. PATIENT-LVL III: ICD-10-PCS | Mod: PBBFAC,HCNC,, | Performed by: FAMILY MEDICINE

## 2020-03-10 PROCEDURE — 99214 OFFICE O/P EST MOD 30 MIN: CPT | Mod: HCNC,S$GLB,, | Performed by: FAMILY MEDICINE

## 2020-03-10 PROCEDURE — 3077F PR MOST RECENT SYSTOLIC BLOOD PRESSURE >= 140 MM HG: ICD-10-PCS | Mod: HCNC,CPTII,S$GLB, | Performed by: FAMILY MEDICINE

## 2020-03-10 PROCEDURE — 1126F AMNT PAIN NOTED NONE PRSNT: CPT | Mod: HCNC,S$GLB,, | Performed by: FAMILY MEDICINE

## 2020-03-10 PROCEDURE — 99999 PR PBB SHADOW E&M-EST. PATIENT-LVL III: CPT | Mod: PBBFAC,HCNC,, | Performed by: FAMILY MEDICINE

## 2020-03-10 RX ORDER — FERROUS GLUCONATE 324(37.5)
324 TABLET ORAL
Qty: 90 TABLET | Refills: 4 | Status: SHIPPED | OUTPATIENT
Start: 2020-03-10 | End: 2020-09-11

## 2020-03-10 NOTE — TELEPHONE ENCOUNTER
Left  for patient to call us back to get her rescheduled due to dr Serrano not being in tomorrow 03/11/2020

## 2020-03-10 NOTE — PROGRESS NOTES
Individual Follow-Up Form    3/10/2020    Quit Date:     Clinical Status of Patient: Outpatient    Length of Service: 30 minutes    Continuing Medication: no    Other Medications: none     Target Symptoms: Withdrawal and medication side effects. The following were  rated moderate (3) to severe (4) on TCRS:  · Moderate (3): none  · Severe (4): none    Comments: Patient remains tobacco free at this time.  Patient continue to fight urges and cravings to smoke.  Patient reports using distractions to aid in cessation.  Patient is no longer using any tobacco cessation medications.  Patient reports gaining over 20 lbs since quitting tobacco.  Patient was encouraged to implement exercise with her ability to aid with weight gain.  We discussed nutrition, the importance of not mistaking urges for hunger, pre planning snacks and meal.  The patient denies any abnormal behavioral or mental changes at this time.       Diagnosis: F17.210    Next Visit: 2 weeks

## 2020-03-10 NOTE — TELEPHONE ENCOUNTER
Called patient. Was able to assist with rescheduling consult appointment. Pt. Understands new date and time.

## 2020-03-10 NOTE — PROGRESS NOTES
Subjective:       Patient ID: Ca Coats is a 69 y.o. female.    Chief Complaint: Establish Care    HPI  This patient is new to me.   Ca Coats is a 69 y.o. year old female with HTN, HLD, LAURA, GERD, normocytic anemia, tobacco smoker, chronic pain who presents today to establish care.    HTN - compliant with amlodipine 10 mg daily, lisinopril 40 mg daily    GERD - compliant with pantoprazole 40 mg daily and sucralfate. S/p EGD 2018 at outside facility. Revealed healed gastric ulcer.     Multiple thyroid nodules - last US May 2019 at outside facility. FNA recommended. According to Dr. Chung notes, patient is s/p laser therapy (unable to find records to confirm).     Quit smoking 2 months ago.     Morbid obesity - following with Bariatric medicine. Wants to have bariatric surgery. Weight continuing to go up. Says she is following the diet recommended.   Walks at her home with a walker.  Uses wheelchair when out.   Fell 2019 and was in a nursing home for 3 months. Was 360 lbs at the nursing home.     LAURA - wears CPAP mask nightly     Chronic pain - compliant with Percocet BID. Follows with pain management.     Compliant with statin   Lab Results   Component Value Date    CHOL 119 (L) 2019     Lab Results   Component Value Date    HDL 43 2019     Lab Results   Component Value Date    LDLCALC 57.8 (L) 2019     Lab Results   Component Value Date    TRIG 91 2019     Lab Results   Component Value Date    CHOLHDL 36.1 2019     Normocytic anemia -   Lab Results   Component Value Date    WBC 11.70 2019    HGB 11.7 (L) 2019    HCT 36.1 (L) 2019    MCV 96 2019     2019     OB/GYN History     Patient is post-menopausal.    Health Maintenance  Pap smear: patient will return to see gyn for Pap    Mammogram: 2020 - normal   Colon Cancer Screening: colonoscopy 2017 - normal (at [facility]). Repeat in 10 years. See  "records  DEXA: 1/2/2020 - osteopenia (low risk)   Hepatitis C screening: negative   Flu vaccine: UTD  Tetanus vaccine: UTD  PNA vaccine: due for PNA 23 Jan 2021  Shingles vaccine: due for 2nd dose    I personally reviewed Past Medical History, Past Surgical History, Social History, and Family History    Review of Systems   Constitutional: Negative for chills, fatigue, fever and unexpected weight change.   HENT: Negative for congestion, hearing loss, rhinorrhea and sore throat.    Eyes: Negative for visual disturbance.   Respiratory: Negative for cough, shortness of breath and wheezing.    Cardiovascular: Negative for chest pain, palpitations and leg swelling.   Gastrointestinal: Negative for abdominal pain, constipation, diarrhea, nausea and vomiting.   Genitourinary: Negative for dysuria, frequency, menstrual problem and urgency.   Musculoskeletal: Positive for arthralgias. Negative for myalgias.   Skin: Negative for rash.   Neurological: Negative for dizziness, syncope and headaches.   Psychiatric/Behavioral: Negative for dysphoric mood and sleep disturbance. The patient is not nervous/anxious.        Objective:      Vitals:    03/10/20 1115   BP: (!) 142/90   Pulse: 78   SpO2: 98%   Weight: (!) 177.5 kg (391 lb 5.1 oz)   Height: 5' 6" (1.676 m)   Body mass index is 63.16 kg/m².    Physical Exam   Constitutional: She is oriented to person, place, and time. She appears well-developed. No distress.   Morbidly obese   HENT:   Head: Normocephalic and atraumatic.   Eyes: Conjunctivae and EOM are normal.   Neck: Normal range of motion.   Cardiovascular: Normal rate, regular rhythm and normal heart sounds.   No murmur heard.  Pulmonary/Chest: Effort normal and breath sounds normal. No respiratory distress.   Abdominal: Soft. Bowel sounds are normal. There is no tenderness.   Neurological: She is alert and oriented to person, place, and time.   Skin: Skin is warm and dry. No rash noted.   Psychiatric: She has a normal " mood and affect. Her behavior is normal. Thought content normal.   Nursing note and vitals reviewed.      Assessment:       1. Encounter to establish care with new doctor    2. Essential hypertension    3. Mixed hyperlipidemia    4. Obstructive sleep apnea    5. Normocytic anemia    6. Gastroesophageal reflux disease, esophagitis presence not specified    7. Morbid obesity with BMI of 60.0-69.9, adult    8. Tobacco smoker within last 12 months        Plan:     Ca was seen today for establish care.    Diagnoses and all orders for this visit:    Encounter to establish care with new doctor    Essential hypertension  Continue current medication regimen.    Mixed hyperlipidemia  Continue statin     Obstructive sleep apnea  Continue CPAP    Normocytic anemia  Needs lab work to eval - will do with next labs.   -     ferrous gluconate 324 mg (37.5 mg iron) Tab tablet; Take 1 tablet (324 mg total) by mouth daily with breakfast.    Gastroesophageal reflux disease, esophagitis presence not specified  Controlled. Continue current medication regimen.     Morbid obesity with BMI of 60.0-69.9, adult  Follow-up with bariatric medicine    Tobacco smoker within last 12 months  Congratulated patient on cessation. Encouraged her to continue with cessation.    I personally reviewed the patient's medical record, including physician notes, imaging, and labs that were available to me today.

## 2020-03-12 DIAGNOSIS — M25.562 CHRONIC PAIN OF LEFT KNEE: ICD-10-CM

## 2020-03-12 DIAGNOSIS — G89.4 CHRONIC PAIN DISORDER: ICD-10-CM

## 2020-03-12 DIAGNOSIS — G89.29 CHRONIC PAIN OF LEFT KNEE: ICD-10-CM

## 2020-03-12 DIAGNOSIS — M17.12 PRIMARY OSTEOARTHRITIS OF LEFT KNEE: ICD-10-CM

## 2020-03-12 RX ORDER — OXYCODONE AND ACETAMINOPHEN 5; 325 MG/1; MG/1
1 TABLET ORAL 2 TIMES DAILY
Qty: 60 TABLET | Refills: 0 | Status: SHIPPED | OUTPATIENT
Start: 2020-03-12 | End: 2020-04-08 | Stop reason: SDUPTHER

## 2020-03-12 NOTE — TELEPHONE ENCOUNTER
----- Message from Ashley Araiza sent at 3/12/2020  9:37 AM CDT -----  Contact: LU RENAE [[id number]]  Can the clinic reply in [facility]:       Please refill the medication(s) listed below. The patient can be reached at this phone number [phone] once it is called into the pharmacy.      Medication #1 oxyCODONE-acetaminophen (PERCOCET) 5-325 mg per tablet      Preferred Pharmacy:  [facility] [address] [place] [address] [address] [place] [address]  Phone: [phone] Fax: [phone]

## 2020-03-12 NOTE — TELEPHONE ENCOUNTER
Patient requesting refill on oxycodone-acetaminophen (PERCOCET) 5-325 mg per tablet    Last office visit 01/06/2020     shows last refill on 02/14/2020    Patient does have a pain contract on file with [facility]    Patient last UDS 10/04/2019 was consistent with current therapy    Please review.  Thanks

## 2020-03-22 PROBLEM — F17.200 TOBACCO SMOKER WITHIN LAST 12 MONTHS: Status: ACTIVE | Noted: 2020-03-22

## 2020-03-22 PROBLEM — D64.9 NORMOCYTIC ANEMIA: Status: ACTIVE | Noted: 2020-03-22

## 2020-03-22 PROBLEM — K21.9 GASTROESOPHAGEAL REFLUX DISEASE: Status: ACTIVE | Noted: 2020-03-22

## 2020-03-23 ENCOUNTER — TELEPHONE (OUTPATIENT)
Dept: PAIN MEDICINE | Facility: CLINIC | Age: 70
End: 2020-03-23

## 2020-03-23 ENCOUNTER — TELEPHONE (OUTPATIENT)
Dept: BARIATRICS | Facility: CLINIC | Age: 70
End: 2020-03-23

## 2020-03-23 DIAGNOSIS — M79.18 MYOFASCIAL PAIN: ICD-10-CM

## 2020-03-23 DIAGNOSIS — R52 ACUTE PAIN: Primary | ICD-10-CM

## 2020-03-23 NOTE — TELEPHONE ENCOUNTER
Staff contacted the patient regarding her request for a muscle relaxer as she has pulled a muscle.     Patient said that she is in pain and the pain is not coming down since she first pulled her muscle.  Patient is requesting a muscle relaxer.     Staff informed the patient that her request for a refill has been presented to you for approval as soon as your response is received a member of staff will be in contact with you.     Patient verbalized understanding and expressed thanks for calling.

## 2020-03-23 NOTE — TELEPHONE ENCOUNTER
Nicotine level acceptable for surgery.  Due to COVID-19 concerns all non-essential surgeries on hold at this time.  Message to staff to follow up with COVID concerns lifted.

## 2020-03-23 NOTE — TELEPHONE ENCOUNTER
----- Message from Candace Ladonna sent at 3/23/2020  1:02 PM CDT -----  Contact: LU RENAE [[id number]]  Name of Who is Calling: LU RENAE [[id number]]    What is the request in detail: Patient states she pulled a muscle on her back and is requesting something to help with the pain. Please send to [facility] [address] [place] [address]. Please contact to further discuss and advise      Can the clinic reply by [facility]: no    What Number to Call Back if not in [facility]: [phone]

## 2020-03-24 RX ORDER — METHOCARBAMOL 500 MG/1
500 TABLET, FILM COATED ORAL 3 TIMES DAILY PRN
Qty: 45 TABLET | Refills: 3 | Status: SHIPPED | OUTPATIENT
Start: 2020-03-24 | End: 2020-05-01

## 2020-03-24 NOTE — TELEPHONE ENCOUNTER
Methocarbamol sent to pharmacy.  Recommend starting methocarbamol 500 mg at bedtime for pain as needed for muscle spasm/pain. If tolerated, she may increase this up to 3 times daily.  This medication may make her sleepy, so she should not drive until she knows how it affect her.      Thanks!  LW

## 2020-03-24 NOTE — TELEPHONE ENCOUNTER
Staff contacted patient and explain the direction of the medication Methocarbamol of how to take it from provider Dr. Young MD    Pt verbalized understanding and stated that she does not drive but will follow the instructions that was given to her.

## 2020-03-27 DIAGNOSIS — E78.2 MIXED HYPERLIPIDEMIA: ICD-10-CM

## 2020-03-27 DIAGNOSIS — I10 ESSENTIAL HYPERTENSION: Primary | ICD-10-CM

## 2020-03-27 RX ORDER — ATORVASTATIN CALCIUM 40 MG/1
40 TABLET, FILM COATED ORAL DAILY
Qty: 90 TABLET | Refills: 3 | Status: SHIPPED | OUTPATIENT
Start: 2020-03-27 | End: 2020-12-03

## 2020-03-27 NOTE — TELEPHONE ENCOUNTER
----- Message from Eunice Hurd MA sent at 3/27/2020  9:55 AM CDT -----  Contact: Samantha from [facility]      ----- Message -----  From: Rosario Joel  Sent: 3/27/2020   9:10 AM CDT  To: [facility] Staff    Who called: Samantha from [facility]    What is the request in detail: Samantha is requesting a call back concerning the status of the script for atorvastatin (LIPITOR) 40 MG tablet.  Please advise.    Can the clinic reply by [facility]? No    Best call back number: [phone]    Additional Information: N/A

## 2020-04-01 ENCOUNTER — TELEPHONE (OUTPATIENT)
Dept: INTERNAL MEDICINE | Facility: CLINIC | Age: 70
End: 2020-04-01

## 2020-04-01 DIAGNOSIS — I10 ESSENTIAL HYPERTENSION: Primary | ICD-10-CM

## 2020-04-01 RX ORDER — AMLODIPINE BESYLATE 10 MG/1
10 TABLET ORAL DAILY
Qty: 90 TABLET | Refills: 1 | Status: SHIPPED | OUTPATIENT
Start: 2020-04-01 | End: 2020-08-03

## 2020-04-01 RX ORDER — LISINOPRIL 40 MG/1
40 TABLET ORAL DAILY
Qty: 90 TABLET | Refills: 1 | Status: SHIPPED | OUTPATIENT
Start: 2020-04-01 | End: 2020-08-03

## 2020-04-01 NOTE — TELEPHONE ENCOUNTER
----- Message from Jorge Jackson sent at 4/1/2020 11:58 AM CDT -----  Contact: LU RENAE [[id number]]      Can the clinic reply in [facility]: no      Please refill the medication(s) listed below. Please call the patient when the prescription(s) is ready for  at this phone number   [phone]        Medication #1 lisinopril (PRINIVIL,ZESTRIL) 40 MG tablet    Medication #2 amLODIPine (NORVASC) 10 MG tablet      Preferred Pharmacy: [facility] - [facility] [address]

## 2020-04-08 ENCOUNTER — OFFICE VISIT (OUTPATIENT)
Dept: PAIN MEDICINE | Facility: CLINIC | Age: 70
End: 2020-04-08
Attending: ANESTHESIOLOGY
Payer: MEDICARE

## 2020-04-08 DIAGNOSIS — G89.4 CHRONIC PAIN DISORDER: ICD-10-CM

## 2020-04-08 DIAGNOSIS — M25.562 CHRONIC PAIN OF LEFT KNEE: ICD-10-CM

## 2020-04-08 DIAGNOSIS — G89.29 CHRONIC PAIN OF LEFT KNEE: ICD-10-CM

## 2020-04-08 DIAGNOSIS — M17.12 PRIMARY OSTEOARTHRITIS OF LEFT KNEE: ICD-10-CM

## 2020-04-08 PROCEDURE — 99441 PR PHYSICIAN TELEPHONE EVALUATION 5-10 MIN: ICD-10-PCS | Mod: HCNC,95,, | Performed by: ANESTHESIOLOGY

## 2020-04-08 PROCEDURE — 99441 PR PHYSICIAN TELEPHONE EVALUATION 5-10 MIN: CPT | Mod: HCNC,95,, | Performed by: ANESTHESIOLOGY

## 2020-04-08 RX ORDER — OXYCODONE AND ACETAMINOPHEN 5; 325 MG/1; MG/1
1 TABLET ORAL 3 TIMES DAILY PRN
Qty: 90 TABLET | Refills: 0 | Status: SHIPPED | OUTPATIENT
Start: 2020-04-08 | End: 2020-05-01 | Stop reason: SDUPTHER

## 2020-04-08 NOTE — PROGRESS NOTES
"Subjective:     Patient ID: Ca Coats is a 69 y.o. female.    Chief Complaint: Pain    Consulted by: Self      Disclaimer: This note was generated using voice recognition software.  There may be a typographical errors that were missed during proofreading.    The patient location is: Home   The chief complaint leading to consultation is: back and knee pain   Visit type: Virtual visit with audio only   Total time spent with patient: 9 min   Each patient to whom he or she provides medical services by telemedicine is: (1) informed of the relationship between the physician and patient and the respective role of any other health care provider with respect to management of the patient; and (2) notified that he or she may decline to receive medical services by telemedicine and may withdraw from such care at any time.     HPI:    Ca Coats is a 69 y.o. female who presents today with left knee pain. Her pain has been going on for "too long."  She was previously treated by Dr. Iyer at [facility].  She has injections [id number] with him.  These were helpful, but she does not know if the injections were steroid or viscosupplementation.   This pain is described in detail below.    Interval History (4/30/2018):  The patient returns to clinic today for follow up and records review. We did received records from [facility] including a MRI of her knee. Dr. Iyer's last office visit note from January 2017 does not include any previous injections. She continues to report bilateral knee pain, left greater than right. She describes this pain as constant and aching. This pain is worse with prolonged walking. She also report right shoulder pain with prolonged overhead activity. She continues to take Percocet with benefit. She denies any other health changes.    Interval History (5/31/2018):  The patient returns to clinic for follow up. She is s/p left knee Synvisc One injection on 5/8/2018. She reports 35% " relief of her knee pain. She continues to report bilateral knee pain, left greater than right. She describes this pain as constant and aching. Her pain is worse with prolonged walking and standing. She is scheduled to see Orthopedics at the  End of this month. She is also following up with Bariatrics to discuss the lap band procedure. She continues to take Percocet with benefit. She also uses Voltaren gel with benefit but this is expensive for her. She denies any other health changes.         Interval History (7/2/2018):  The patient returns to clinic today for follow up. She continues to report left knee pain that is constant, sharp, and aching in nature. Her pain is worse with prolonged walking and standing. She is scheduled for weight loss surgery in August. She continues follow up with orthopedics who does not recommend surgery at this time due to her BMI. She continues to take Percocet as needed with benefit. She denies any other health changes.     Interval History (7/19/2018):  The patient returns to clinic today for follow up. She is s/p left genicular nerve block on 7/10/2018. She reports 80% relief of her left knee pain. She reports that she was able to walk for longer distances (3 blocks) after the block. Her pain has returned to baseline. She continues to report left knee pain that is constant, sharp, and aching in nature. Her pain is worse with prolonged walking and standing. She denies any other health changes.     Interval History (8/21/2018):  The patient returns to clinic today for follow up. She is s/p left genicular cooled RFA on 7/31/2018. She reports 60% relief of her left knee pain. She reports intermittent knee pain that is sore and aching in nature. She continues to perform a home exercise routine. She is actively trying to lose weight. She continues to follow up with Bariatric Surgery at [facility]. She is considering weight loss surgery. She continues to use compound cream with benefit. She  "denies any other health changes.     Interval History (11/21/2018):  The patient returns to clinic today for follow up. She reports increased left knee pain this past week. She reports 2 episodes where her knee has "locked" up on her while walking. She describes her knee pain as sore and aching. She is actively trying to lose weight and quit smoking. She continues to use the compound cream with benefit. She denies any other health changes.     Interval History (1/9/2019):  The patient returns to clinic today for follow up. She is s/p left knee steroid injection on 12/21/2018. She reports one day of relief. She continues to report left knee pain that is constant, sharp, and aching in nature. Her pain is worse with standing and walking. She reports that her knee "locks" up on her while walking. She often feels as though she is going to fall. She is scheduled for bariatric weight loss surgery in February at [facility]. She denies any other health changes.     Interval History (2/28/2019):  The patient returns to clinic today for follow up. She is s/p Euflexxa series completed on 1/30/2019. She reports that she able to stand for longer periods of time since the procedure. She continues to report left knee pain that is sore and aching in nature. Her pain is worse with prolonged standing and walking. She was previously scheduled for weight loss surgery but reports this was canceled. She is scheduled for follow up next week about this. She denies any other health changes.     Interval History (4/12/2019):  The patient returns for f/u of left knee pain.  She is s/p left genicular cooled RFA with about 90% pain relief.  She has been able to walk easier.  She also notices less stiffness in her knee.  She is planning on gastric surgery prior to knee replacement.  She had benefit with compounding cream but it is no longer covered by her insurance.  She does take Percocet from her PCP.  Her pain today is 7/10.    Interval History " (7/11/19):  Patient reported to ED today for increased L knee pain and an episode of LLE weakness that lead to a near fall. Patient is s/p L genicular RFA in 3/19/19 that provided 90% relief for 2 months then came back. Pain is a crunchy pain, in left knee, that does not radiate, worse with movement, better with rest. Pain today is 9/10. She normally uses cane to ambulate but is currently using wheelchair at hospital. Denies any trauma ot the area, fever/chills, n/v, abdominal pain, or HA.    Interval History (8/8/2019):  She returns today for follow up s/p left knee genicular chemodenervation with phenol 7/19/19.  She reports that this procedure did not provide any improvement in her left knee symptoms, and she is still having significant difficulty with ambulation, still uses wheelchair for mobility. She also reports left lateral thigh and hip pain lateral upper thigh numbness. She has been admitted to [facility] but is not receiving PT there. She is still considering lap band surgery but has missed a followup appointment.     Interval History (9/4/2019):  The patient returns to clinic today for follow up. She continues to report intermittent left lateral thigh and hip pain that is tingling and numb in nature. She continues to report left knee pain. She continues to have difficulty ambulating due to pain. She is currently living in a SNF. She continues to take Gabapentin 300 mg BID. This was increased at last visit but not increased at the nursing home. She does report benefit with Percocet though it only last approximately 4-5 hours. She denies any adverse effects. She does present with a care attendant from the SNF today. Her pain today is 10/10.    Interval History (10/4/2019):  The patient returns to clinic today for follow up and medication refill. She continues to report bilateral knee pain that is aching in nature. She reports intermittent left lateral thigh pain that is tingling and shooting in  nature. She reports that her pain has been improving since last visit. She has increased her activity. She continues to report benefit with Gabapentin. She continues to take Percocet as needed with benefit. She denies any other health changes. Her pain today is 0/10.    Interval History (1/6/2020):  The patient returns to clinic today for follow up. She reports increased left leg pain today that is tingling in nature. She continues to report bilateral knee pain, left greater than right. She describes this pain as constant, sharp, and aching. She is no longer living at the nursing facility. She has increased her home exercise routine. She is actively trying to quit smoking in order to move forward with bariatric surgery. She continues to report benefit with current medication regimen. She takes Gabapentin and Percocet with benefit and without adverse effect. She denies any other health changes. Her pain today is 7/10.    Interval History (4/8/2020):  She returns today for follow up via audio.  She reports that the methocarbamol is not helping.  She continues to have a left-sided low back pain that is bothering her.  Her left knee continues to hurt.  Her right knee is hurting a little as well.  Percocet continues to help, but it is not lasting long enough.    Physical Therapy: Yes, she did this in 2018 for one month (twice a week). She does HEP (stretching and ROM in the morning)    Non-pharmacologic Treatment:     · Ice/Heat: Heat is more helpful than ice  · TENS: Not tried  · Massage: Not tried  · Chiropractic care: Not tried  · Acupuncture: Not tried  · Other: Uses a cane         Pain Medications:         · Currently taking: Gabapentin, tylenol 500mg Q6 prn, Percocet 5/325 mg BID PRN    · Has tried in the past:  Ibuprofen (still takes sometimes), Voltaren gel    · Has not tried: Muscle relaxants, TCAs, SNRIs, Lyrica, compound topical creams    Blood thinners: None    Interventional Therapies: [id number] injections by  Dr. Iyer were helpful  · 5/8/2018- Left knee Synvisc One injection  · 7/10/2018- Left genicular nerve block  · 7/31/2018- Left genicular cooled RFA  · 12/21/2018- Left knee steroid injection  · 1/31/2019- Left knee Euflexxa series  · 3/19/2019 Left genicular cooled RFA- 90% relief  · 7/19/19- Left knee genicular phenol chemodenervation-no relief    Relevant Surgeries: None    Affecting sleep? No    Affecting daily activities? Yes    Depressive symptoms? No          · SI/HI? No    Work status: No, on disability due to her knee    Prescription Monitoring Program database:  Reviewed and consistent with medication use as prescribed.      Last 3 PDI Scores 1/6/2020 10/4/2019 9/4/2019   Pain Disability Index (PDI) 38 0 70   ]    GENERAL: No weight loss, malaise or fevers.  HEENT:   No recent changes in vision or hearing  NECK:  Negative for lumps, no difficulty with swallowing.  RESPIRATORY:  Negative for cough, wheezing or shortness of breath, patient denies any recent URI.  CARDIOVASCULAR:  Negative for chest pain, leg swelling or palpitations.  GI:  Negative for abdominal discomfort, blood in stools or black stools or change in bowel habits.  MUSCULOSKELETAL:  See HPI.  SKIN:  Negative for lesions, rash, and itching.  PSYCH:  Negative for mood disorder or recent psychosocial stressors.    HEMATOLOGY/LYMPHOLOGY:  Negative for prolonged bleeding, bruising easily or swollen nodes.    ENDO: No history of diabetes or thyroid dysfunction  NEURO:   No history of headaches, syncope, paralysis, seizures or tremors.  All other reviewed and negative other than HPI.          Past Medical History:   Diagnosis Date    Anemia     2018    Arthritis     BMI 60.0-69.9, adult     Cataract     Depression     Dry eye syndrome     GERD (gastroesophageal reflux disease)     Glaucoma suspect     History of gastric ulcer     Hyperlipidemia     Hypertension     Hypothyroidism     Morbid obesity     Neuromuscular disorder      Thyroid disease        Past Surgical History:   Procedure Laterality Date    APPENDECTOMY      CATARACT EXTRACTION W/  INTRAOCULAR LENS IMPLANT Bilateral     MFIOL OU    INJECTION OF ANESTHETIC AGENT AROUND NERVE Left 7/10/2018    Procedure: BLOCK, NERVE;  Surgeon: Samantha Vidal MD;  Location: [facility];  Service: Pain Management;  Laterality: Left;  Genicular     INJECTION OF ANESTHETIC AGENT AROUND NERVE Left 7/19/2019    Procedure: Block, Nerve NERVE BLOCK GENICULAR WITH PHENOL 6%;  Surgeon: Samantha Vidal MD;  Location: [facility];  Service: Pain Management;  Laterality: Left;  NEEDS CONSENT       Review of patient's allergies indicates:  No Known Allergies    Current Outpatient Medications   Medication Sig Dispense Refill    acetaminophen (TYLENOL) 500 MG tablet       amLODIPine (NORVASC) 10 MG tablet Take 1 tablet (10 mg total) by mouth once daily. 90 tablet 1    atorvastatin (LIPITOR) 40 MG tablet Take 1 tablet (40 mg total) by mouth once daily. 90 tablet 3    B-complex with vitamin C (Z-BEC OR EQUIV) tablet Take 1 tablet by mouth once daily.       CALCIUM CITRATE-VITAMIN D2 ORAL Take 1 tablet by mouth once daily.       ferrous gluconate 324 mg (37.5 mg iron) Tab tablet Take 1 tablet (324 mg total) by mouth daily with breakfast. 90 tablet 4    gabapentin (NEURONTIN) 300 MG capsule Take 2 capsules (600 mg total) by mouth 2 (two) times daily. 360 capsule 2    lisinopriL (PRINIVIL,ZESTRIL) 40 MG tablet Take 1 tablet (40 mg total) by mouth once daily. 90 tablet 1    methocarbamoL (ROBAXIN) 500 MG Tab Take 1 tablet (500 mg total) by mouth 3 (three) times daily as needed (muscle spasms). 45 tablet 3    multivitamin with minerals tablet Take 1 tablet by mouth once daily.       omega 3-dha-epa-fish oil 1,000 mg (120 mg-180 mg) Cap       oxyCODONE-acetaminophen (PERCOCET) 5-325 mg per tablet Take 1 tablet by mouth 2 (two) times daily. 60 tablet 0    pantoprazole (PROTONIX) 40 MG tablet  Take 1 tablet (40 mg total) by mouth 2 (two) times daily. 90 tablet 1    sucralfate (CARAFATE) 1 gram tablet Take 1 tablet (1 g total) by mouth 4 (four) times daily before meals and nightly. 360 tablet 1     No current facility-administered medications for this visit.        Family History   Problem Relation Age of Onset    No Known Problems Mother     No Known Problems Father        Social History     Socioeconomic History    Marital status: Single     Spouse name: Not on file    Number of children: 2    Years of education: Not on file    Highest education level: Not on file   Occupational History     Comment: retired  and cook   Social Needs    Financial resource strain: Not on file    Food insecurity:     Worry: Not on file     Inability: Not on file    Transportation needs:     Medical: Not on file     Non-medical: Not on file   Tobacco Use    Smoking status: Former Smoker     Packs/day: 0.25     Years: 50.00     Pack years: 12.50     Types: Cigarettes     Last attempt to quit: 2020     Years since quittin.2    Smokeless tobacco: Never Used   Substance and Sexual Activity    Alcohol use: Not Currently    Drug use: No    Sexual activity: Not Currently     Partners: Male   Lifestyle    Physical activity:     Days per week: Not on file     Minutes per session: Not on file    Stress: Not on file   Relationships    Social connections:     Talks on phone: Not on file     Gets together: Not on file     Attends [employer] service: Not on file     Active member of club or organization: Not on file     Attends meetings of clubs or organizations: Not on file     Relationship status: Not on file   Other Topics Concern    Not on file   Social History Narrative    Not on file       Objective:     There were no vitals filed for this visit.    No physical exam performed due to audio visit only.    Previous physical exam:    GEN:  Well developed, well nourished.  No acute distress. No pain  behavior.  HEENT:  No trauma.  Mucous membranes moist.  Nares patent bilaterally.  PSYCH: Normal affect. Thought content appropriate.  CHEST:  Breathing symmetric.  No audible wheezing.  ABD:   · Soft, non-distended.    SKIN:  Warm, pink, dry.  No rash on exposed areas.    EXT:  No erythema noted TTP over lateral aspect of knee, 5/5 stregnth in BLE muscles groups, 2+ reflexes bilaterally  No color change or changes in nail or hair growth.  NEURO/MUSCULOSKELETAL:  Fully alert, oriented, and appropriate. Speech normal francesca. No cranial nerve deficits.   Gait: in wheelchair, guarding of the LLE  Motor Strength: 5/5 motor strength throughout lower extremities.   Sensory: hyperesthesia mid left lateral thigh, small area of numbness left lateral knee. Negative straight leg raise bilaterally.     Left knee  Inspection:  No erythema or redness. No bruising.  ROM: Full ROM but with pain on flexion and extension.   Palpation: No pes anserine tenderness and positive crepitus. Pain with palpation over medial and lateral joint line of left knee. Difficult to assess ligamentous laxity due to habitus.       Imaging:      MRI Left Knee 7/21/2017 (in media):  The medial meniscus is intact. The lateral meniscus is intact.     A chronic complete ACL tear is noted. The posterior cruciate ligament is intact. The medial and lateral retinacula are intact. The medial collateral ligament is intact. The lateral collateral ligament in intact. The posterolateral corner structures are intact.     There is full thickness fissuring of the central aspect medial femoral cartilage. The lateral tibiofemoral compartment cartilage is intact. There is broad thickness defect within the central trochlear cartilage. There is mild lateral patellofemoral cartilage thinning and regional full thickness loss of the central superior patellar cartilage.     A small effusion is present. There is trace infrapatellar fluid. Tiny popliteal cyst is noted. Subtle  focal marrow edema is seen within the posterior tibial plateau. The bone marrow signal is heterogeneous likely reflecting marrow reconversion.     There is trace pes anserine bursitis. The tendons are otherwise normal.     Grade 2 fatty streaking of the semimembranosus and vastus lateralis muscles are noted. There is mild prepatellar edema.     MRI Right Shoulder 7/21/2017 (in media):   There is a complete tear of the supraspinatus tendon with retraction to the glenohumeral joint. There is partial tearing of the anterior infraspinatus articular surface. Mild subscapularis tendinosis is noted. The teres minor is intact. A small amount of subacromial/subdeltoid fluid is noted. The proximal long head biceps tendon is poorly visualized proximally although intact distally.     Degenerative tearing of the anterosuperior through posterosuperior labrum is noted. There is moderate to severe superior glenoid cartilage thinning with subchondral degenerative changes. The glenohumeral ligaments appear grossly intact.     An os acromiale is seen with fluid and osteophytosis at its junction with the base of the acromion. There is moderate superior subluxation of the AC joint with mild osteophytosis.     Small effusion with subcoracoid extension is noted.     There is grade 2/3 fatty atrophy of the supraspinatus muscle, grade 2 of the infraspinatus and subscapularis. Heterogeneous signal is seen throughout the bone marrow likely reflecting marrow reconversion.       Assessment:     Encounter Diagnoses   Name Primary?    Chronic pain disorder     Primary osteoarthritis of left knee     Chronic pain of left knee        Plan:     Diagnoses and all orders for this visit:    Chronic pain disorder  -     oxyCODONE-acetaminophen (PERCOCET) 5-325 mg per tablet; Take 1 tablet by mouth 3 (three) times daily as needed for Pain.    Primary osteoarthritis of left knee  -     oxyCODONE-acetaminophen (PERCOCET) 5-325 mg per tablet; Take 1  tablet by mouth 3 (three) times daily as needed for Pain.    Chronic pain of left knee  -     oxyCODONE-acetaminophen (PERCOCET) 5-325 mg per tablet; Take 1 tablet by mouth 3 (three) times daily as needed for Pain.       Her pain is consistent with the above.    We discussed the assessment and recommendations.  All available images were reviewed. We discussed the disease process, prognosis, treatment plan, and risks and benefits. The patient is aware of the risks and benefits of the medications being prescribed, common side effects, and proper usage. The following is the plan we agreed on:     1. Consider repeat genicular radiofrequency ablation for her knee in the future  2. Patient has tolerated opioids in the past and notes improved function and quality of life while taking opioids for pain for debilitating knee pain. She is a candidate for palliative care at this time.   3. Until we are able to meet in person to reassess, we will temporarily increase Percocet 5/325 mg to TID PRN, #90. Refill sent today with appropriate date.   4. Consider Flexeril  5. The patient is here today for a refill of current pain medications and they believe these provide effective pain control and improvements in quality of life.  The patient notes no serious side effects, and feels the benefits outweigh the risks.  The patient was reminded of the pain contract that they signed previously as well as the risks and benefits of the medication including possible death.  The updated [place] Board [employer] prescription monitoring program was reviewed, and the patient has been found to be compliant with current treatment plan. Medication management provided by Dr. Vidal.  6. Pain Contract signed 9/4/2019.   7. UDS from 10/4/2019 reviewed and consistent.    8. Continue Gabapentin 600 mg BID. Refill provided today.   9. Recommended patient followup with bariatric surgery clinic. Encouraged her to continue working on smoking cessation.    10. RTC in 1 month or sooner if needed.    The above plan and management options were discussed at length with patient. Patient is in agreement with the above and verbalized understanding.     Samantha Vidal MD  04/08/2020

## 2020-04-09 ENCOUNTER — TELEPHONE (OUTPATIENT)
Dept: INTERNAL MEDICINE | Facility: CLINIC | Age: 70
End: 2020-04-09

## 2020-04-23 ENCOUNTER — TELEPHONE (OUTPATIENT)
Dept: INTERNAL MEDICINE | Facility: CLINIC | Age: 70
End: 2020-04-23

## 2020-04-23 NOTE — TELEPHONE ENCOUNTER
----- Message from Cristiana Andrews sent at 4/23/2020  1:46 PM CDT -----  Contact: Pt  Name of Who is Calling: LU RENAE [[id number]]    What is the request in detail: Patient is requesting pain medication for her theroids .... Please contact to further discuss and advise      Can the clinic reply by [facility]: no    What Number to Call Back if not in [facility]:  [phone] (home)

## 2020-04-24 ENCOUNTER — TELEPHONE (OUTPATIENT)
Dept: INTERNAL MEDICINE | Facility: CLINIC | Age: 70
End: 2020-04-24

## 2020-04-24 ENCOUNTER — OFFICE VISIT (OUTPATIENT)
Dept: INTERNAL MEDICINE | Facility: CLINIC | Age: 70
End: 2020-04-24
Payer: MEDICARE

## 2020-04-24 DIAGNOSIS — J02.9 PHARYNGITIS, UNSPECIFIED ETIOLOGY: Primary | ICD-10-CM

## 2020-04-24 PROCEDURE — 99441 PR PHYSICIAN TELEPHONE EVALUATION 5-10 MIN: CPT | Mod: HCNC,95,, | Performed by: INTERNAL MEDICINE

## 2020-04-24 PROCEDURE — 99441 PR PHYSICIAN TELEPHONE EVALUATION 5-10 MIN: ICD-10-PCS | Mod: HCNC,95,, | Performed by: INTERNAL MEDICINE

## 2020-04-24 RX ORDER — AMOXICILLIN 500 MG/1
500 CAPSULE ORAL 2 TIMES DAILY
Qty: 20 CAPSULE | Refills: 0 | Status: SHIPPED | OUTPATIENT
Start: 2020-04-24 | End: 2020-06-02

## 2020-04-24 NOTE — TELEPHONE ENCOUNTER
----- Message from Betty Mckinley sent at 4/24/2020 10:15 AM CDT -----  Contact: LU RENAE [[id number]]  Type:  Patient Returning Call    Who Called: LU RENAE [[id number]]    Who Left Message for Patient: unknown    Does the patient know what this is regarding?: unknown    Can the clinic reply in [facility]: No    Best Call Back Number: [phone]    Additional Information: N/A

## 2020-04-24 NOTE — PROGRESS NOTES
Established Patient - Audio Only Telehealth Visit     The patient location is: home  The chief complaint leading to consultation is: sore throat  Visit type: Virtual visit with audio only (telephone)     The reason for the audio only service rather than synchronous audio and video virtual visit was related to technical difficulties or patient preference/necessity. 9mins spent with pt     Each patient to whom I provide medical services by telemedicine is:  (1) informed of the relationship between the physician and patient and the respective role of any other health care provider with respect to management of the patient; and (2) notified that they may decline to receive medical services by telemedicine and may withdraw from such care at any time. Patient verbally consented to receive this service via voice-only telephone call.       HPI: she reports sore throat started 5 days ago. No fever. No cough. No nasal congestion/rhinorrhea. She reports tender LAD in neck, worse on L. She says she had a thyroid problem that required laser surgery last year and wonders if this is coming back. No dysphagia but does hurt to swallow. She has not looked at throat and says she cannot do so now. No ear pain but feels fullness. No myalgias/flu symptoms. No h/o allergy issues.       Assessment and plan:  Pharyngitis; difficult to tell without video or exam if infectious vs post nasal drainage; she cannot get video to work and is not willing to come to office so will tx empirically with amoxicillin; RTC and ED prompts d/w pt and she understands       This service was not originating from a related E/M service provided within the previous 7 days nor will  to an E/M service or procedure within the next 24 hours or my soonest available appointment.  Prevailing standard of care was able to be met in this audio-only visit.

## 2020-04-29 ENCOUNTER — PATIENT OUTREACH (OUTPATIENT)
Dept: ADMINISTRATIVE | Facility: OTHER | Age: 70
End: 2020-04-29

## 2020-04-29 NOTE — PROGRESS NOTES
Chart reviewed.   Immunizations: Triggered Imm Registry     Orders placed: n/a  Upcoming appts to satisfy DONN topics: n/a

## 2020-04-30 DIAGNOSIS — K21.9 GASTROESOPHAGEAL REFLUX DISEASE, ESOPHAGITIS PRESENCE NOT SPECIFIED: ICD-10-CM

## 2020-04-30 RX ORDER — PANTOPRAZOLE SODIUM 40 MG/1
40 TABLET, DELAYED RELEASE ORAL DAILY
Qty: 90 TABLET | Refills: 1 | Status: SHIPPED | OUTPATIENT
Start: 2020-04-30 | End: 2020-05-04 | Stop reason: SDUPTHER

## 2020-05-01 ENCOUNTER — OFFICE VISIT (OUTPATIENT)
Dept: PAIN MEDICINE | Facility: CLINIC | Age: 70
End: 2020-05-01
Payer: MEDICARE

## 2020-05-01 DIAGNOSIS — G89.4 CHRONIC PAIN DISORDER: Primary | ICD-10-CM

## 2020-05-01 DIAGNOSIS — M17.12 PRIMARY OSTEOARTHRITIS OF LEFT KNEE: ICD-10-CM

## 2020-05-01 DIAGNOSIS — G89.4 CHRONIC PAIN DISORDER: ICD-10-CM

## 2020-05-01 DIAGNOSIS — M25.562 CHRONIC PAIN OF LEFT KNEE: ICD-10-CM

## 2020-05-01 DIAGNOSIS — G89.29 CHRONIC PAIN OF LEFT KNEE: ICD-10-CM

## 2020-05-01 PROCEDURE — 99442 PR PHYSICIAN TELEPHONE EVALUATION 11-20 MIN: ICD-10-PCS | Mod: HCNC,95,, | Performed by: NURSE PRACTITIONER

## 2020-05-01 PROCEDURE — 99442 PR PHYSICIAN TELEPHONE EVALUATION 11-20 MIN: CPT | Mod: HCNC,95,, | Performed by: NURSE PRACTITIONER

## 2020-05-01 RX ORDER — OXYCODONE AND ACETAMINOPHEN 5; 325 MG/1; MG/1
1 TABLET ORAL 3 TIMES DAILY PRN
Qty: 90 TABLET | Refills: 0 | Status: SHIPPED | OUTPATIENT
Start: 2020-05-08 | End: 2020-05-07 | Stop reason: SDUPTHER

## 2020-05-01 NOTE — PROGRESS NOTES
"Subjective:     Patient ID: Ca Coats is a 69 y.o. female.    Chief Complaint: Pain    Consulted by: Self      Disclaimer: This note was generated using voice recognition software.  There may be a typographical errors that were missed during proofreading.    The patient location is: Home   The chief complaint leading to consultation is: back and knee pain   Visit type: Virtual visit with audio only. The reason for the audio only service rather than synchronous audio and video virtual visit was related to technical difficulties or patient preference/necessity.   Total time spent with patient: 11 min   Each patient to whom he or she provides medical services by telemedicine is: (1) informed of the relationship between the physician and patient and the respective role of any other health care provider with respect to management of the patient; and (2) notified that he or she may decline to receive medical services by telemedicine and may withdraw from such care at any time.     HPI:    Ca Coats is a 69 y.o. female who presents today with left knee pain. Her pain has been going on for "too long."  She was previously treated by Dr. Iyer at [facility].  She has injections [id number] with him.  These were helpful, but she does not know if the injections were steroid or viscosupplementation.   This pain is described in detail below.    Interval History (4/30/2018):  The patient returns to clinic today for follow up and records review. We did received records from [facility] including a MRI of her knee. Dr. Iyer's last office visit note from January 2017 does not include any previous injections. She continues to report bilateral knee pain, left greater than right. She describes this pain as constant and aching. This pain is worse with prolonged walking. She also report right shoulder pain with prolonged overhead activity. She continues to take Percocet with benefit. She denies any other " health changes.    Interval History (5/31/2018):  The patient returns to clinic for follow up. She is s/p left knee Synvisc One injection on 5/8/2018. She reports 35% relief of her knee pain. She continues to report bilateral knee pain, left greater than right. She describes this pain as constant and aching. Her pain is worse with prolonged walking and standing. She is scheduled to see Orthopedics at the  End of this month. She is also following up with Bariatrics to discuss the lap band procedure. She continues to take Percocet with benefit. She also uses Voltaren gel with benefit but this is expensive for her. She denies any other health changes.         Interval History (7/2/2018):  The patient returns to clinic today for follow up. She continues to report left knee pain that is constant, sharp, and aching in nature. Her pain is worse with prolonged walking and standing. She is scheduled for weight loss surgery in August. She continues follow up with orthopedics who does not recommend surgery at this time due to her BMI. She continues to take Percocet as needed with benefit. She denies any other health changes.     Interval History (7/19/2018):  The patient returns to clinic today for follow up. She is s/p left genicular nerve block on 7/10/2018. She reports 80% relief of her left knee pain. She reports that she was able to walk for longer distances (3 blocks) after the block. Her pain has returned to baseline. She continues to report left knee pain that is constant, sharp, and aching in nature. Her pain is worse with prolonged walking and standing. She denies any other health changes.     Interval History (8/21/2018):  The patient returns to clinic today for follow up. She is s/p left genicular cooled RFA on 7/31/2018. She reports 60% relief of her left knee pain. She reports intermittent knee pain that is sore and aching in nature. She continues to perform a home exercise routine. She is actively trying to lose  "weight. She continues to follow up with Bariatric Surgery at [facility]. She is considering weight loss surgery. She continues to use compound cream with benefit. She denies any other health changes.     Interval History (11/21/2018):  The patient returns to clinic today for follow up. She reports increased left knee pain this past week. She reports 2 episodes where her knee has "locked" up on her while walking. She describes her knee pain as sore and aching. She is actively trying to lose weight and quit smoking. She continues to use the compound cream with benefit. She denies any other health changes.     Interval History (1/9/2019):  The patient returns to clinic today for follow up. She is s/p left knee steroid injection on 12/21/2018. She reports one day of relief. She continues to report left knee pain that is constant, sharp, and aching in nature. Her pain is worse with standing and walking. She reports that her knee "locks" up on her while walking. She often feels as though she is going to fall. She is scheduled for bariatric weight loss surgery in February at [facility]. She denies any other health changes.     Interval History (2/28/2019):  The patient returns to clinic today for follow up. She is s/p Euflexxa series completed on 1/30/2019. She reports that she able to stand for longer periods of time since the procedure. She continues to report left knee pain that is sore and aching in nature. Her pain is worse with prolonged standing and walking. She was previously scheduled for weight loss surgery but reports this was canceled. She is scheduled for follow up next week about this. She denies any other health changes.     Interval History (4/12/2019):  The patient returns for f/u of left knee pain.  She is s/p left genicular cooled RFA with about 90% pain relief.  She has been able to walk easier.  She also notices less stiffness in her knee.  She is planning on gastric surgery prior to knee replacement.  She had " benefit with compounding cream but it is no longer covered by her insurance.  She does take Percocet from her PCP.  Her pain today is 7/10.    Interval History (7/11/19):  Patient reported to ED today for increased L knee pain and an episode of LLE weakness that lead to a near fall. Patient is s/p L genicular RFA in 3/19/19 that provided 90% relief for 2 months then came back. Pain is a crunchy pain, in left knee, that does not radiate, worse with movement, better with rest. Pain today is 9/10. She normally uses cane to ambulate but is currently using wheelchair at hospital. Denies any trauma ot the area, fever/chills, n/v, abdominal pain, or HA.    Interval History (8/8/2019):  She returns today for follow up s/p left knee genicular chemodenervation with phenol 7/19/19.  She reports that this procedure did not provide any improvement in her left knee symptoms, and she is still having significant difficulty with ambulation, still uses wheelchair for mobility. She also reports left lateral thigh and hip pain lateral upper thigh numbness. She has been admitted to [facility] but is not receiving PT there. She is still considering lap band surgery but has missed a followup appointment.     Interval History (9/4/2019):  The patient returns to clinic today for follow up. She continues to report intermittent left lateral thigh and hip pain that is tingling and numb in nature. She continues to report left knee pain. She continues to have difficulty ambulating due to pain. She is currently living in a SNF. She continues to take Gabapentin 300 mg BID. This was increased at last visit but not increased at the nursing home. She does report benefit with Percocet though it only last approximately 4-5 hours. She denies any adverse effects. She does present with a care attendant from the SNF today. Her pain today is 10/10.    Interval History (10/4/2019):  The patient returns to clinic today for follow up and medication  refill. She continues to report bilateral knee pain that is aching in nature. She reports intermittent left lateral thigh pain that is tingling and shooting in nature. She reports that her pain has been improving since last visit. She has increased her activity. She continues to report benefit with Gabapentin. She continues to take Percocet as needed with benefit. She denies any other health changes. Her pain today is 0/10.    Interval History (1/6/2020):  The patient returns to clinic today for follow up. She reports increased left leg pain today that is tingling in nature. She continues to report bilateral knee pain, left greater than right. She describes this pain as constant, sharp, and aching. She is no longer living at the nursing facility. She has increased her home exercise routine. She is actively trying to quit smoking in order to move forward with bariatric surgery. She continues to report benefit with current medication regimen. She takes Gabapentin and Percocet with benefit and without adverse effect. She denies any other health changes. Her pain today is 7/10.    Interval History (4/8/2020):  She returns today for follow up via audio.  She reports that the methocarbamol is not helping.  She continues to have a left-sided low back pain that is bothering her.  Her left knee continues to hurt.  Her right knee is hurting a little as well.  Percocet continues to help, but it is not lasting long enough.    Interval History (5/1/2020):  The patient returns for audio visit today for follow up. She continues to report bilateral knee pain, left greater than right. Her pain is worse with prolonged standing and walking. She reports that her back pain has improved. She continues to report benefit with current medication regimen. She continues to take Gabapentin and Percocet with benefit and without adverse effects. She denies any other health changes. Her pain today is 8/10.    Physical Therapy: Yes, she did this  in 2018 for one month (twice a week). She does HEP (stretching and ROM in the morning)    Non-pharmacologic Treatment:     · Ice/Heat: Heat is more helpful than ice  · TENS: Not tried  · Massage: Not tried  · Chiropractic care: Not tried  · Acupuncture: Not tried  · Other: Uses a cane         Pain Medications:         · Currently taking: Gabapentin, tylenol 500mg Q6 prn, Percocet 5/325 mg BID PRN    · Has tried in the past:  Ibuprofen (still takes sometimes), Voltaren gel    · Has not tried: Muscle relaxants, TCAs, SNRIs, Lyrica, compound topical creams    Blood thinners: None    Interventional Therapies: [id number] injections by Dr. Iyer were helpful  · 5/8/2018- Left knee Synvisc One injection  · 7/10/2018- Left genicular nerve block  · 7/31/2018- Left genicular cooled RFA  · 12/21/2018- Left knee steroid injection  · 1/31/2019- Left knee Euflexxa series  · 3/19/2019 Left genicular cooled RFA- 90% relief  · 7/19/19- Left knee genicular phenol chemodenervation-no relief    Relevant Surgeries: None    Affecting sleep? No    Affecting daily activities? Yes    Depressive symptoms? No          · SI/HI? No    Work status: No, on disability due to her knee    Prescription Monitoring Program database:  Reviewed and consistent with medication use as prescribed.      Last 3 PDI Scores 1/6/2020 10/4/2019 9/4/2019   Pain Disability Index (PDI) 38 0 70   ]    GENERAL: No weight loss, malaise or fevers.  HEENT:   No recent changes in vision or hearing  NECK:  Negative for lumps, no difficulty with swallowing.  RESPIRATORY:  Negative for cough, wheezing or shortness of breath, patient denies any recent URI.  CARDIOVASCULAR:  Negative for chest pain, leg swelling or palpitations.  GI:  Negative for abdominal discomfort, blood in stools or black stools or change in bowel habits.  MUSCULOSKELETAL:  See HPI.  SKIN:  Negative for lesions, rash, and itching.  PSYCH:  Negative for mood disorder or recent psychosocial stressors.     HEMATOLOGY/LYMPHOLOGY:  Negative for prolonged bleeding, bruising easily or swollen nodes.    ENDO: No history of diabetes or thyroid dysfunction  NEURO:   No history of headaches, syncope, paralysis, seizures or tremors.  All other reviewed and negative other than HPI.          Past Medical History:   Diagnosis Date    Anemia     2018    Arthritis     BMI 60.0-69.9, adult     Cataract     Depression     Dry eye syndrome     GERD (gastroesophageal reflux disease)     Glaucoma suspect     History of gastric ulcer     Hyperlipidemia     Hypertension     Hypothyroidism     Morbid obesity     Neuromuscular disorder     Thyroid disease        Past Surgical History:   Procedure Laterality Date    APPENDECTOMY      CATARACT EXTRACTION W/  INTRAOCULAR LENS IMPLANT Bilateral     MFIOL OU    INJECTION OF ANESTHETIC AGENT AROUND NERVE Left 7/10/2018    Procedure: BLOCK, NERVE;  Surgeon: Samantha Vidal MD;  Location: [facility];  Service: Pain Management;  Laterality: Left;  Genicular     INJECTION OF ANESTHETIC AGENT AROUND NERVE Left 7/19/2019    Procedure: Block, Nerve NERVE BLOCK GENICULAR WITH PHENOL 6%;  Surgeon: Samantha Vidal MD;  Location: [facility];  Service: Pain Management;  Laterality: Left;  NEEDS CONSENT       Review of patient's allergies indicates:  No Known Allergies    Current Outpatient Medications   Medication Sig Dispense Refill    acetaminophen (TYLENOL) 500 MG tablet       amLODIPine (NORVASC) 10 MG tablet Take 1 tablet (10 mg total) by mouth once daily. 90 tablet 1    amoxicillin (AMOXIL) 500 MG capsule Take 1 capsule (500 mg total) by mouth 2 (two) times a day. 20 capsule 0    atorvastatin (LIPITOR) 40 MG tablet Take 1 tablet (40 mg total) by mouth once daily. 90 tablet 3    B-complex with vitamin C (Z-BEC OR EQUIV) tablet Take 1 tablet by mouth once daily.       CALCIUM CITRATE-VITAMIN D2 ORAL Take 1 tablet by mouth once daily.       ferrous gluconate 324 mg  (37.5 mg iron) Tab tablet Take 1 tablet (324 mg total) by mouth daily with breakfast. 90 tablet 4    gabapentin (NEURONTIN) 300 MG capsule Take 2 capsules (600 mg total) by mouth 2 (two) times daily. 360 capsule 2    lisinopriL (PRINIVIL,ZESTRIL) 40 MG tablet Take 1 tablet (40 mg total) by mouth once daily. 90 tablet 1    methocarbamoL (ROBAXIN) 500 MG Tab Take 1 tablet (500 mg total) by mouth 3 (three) times daily as needed (muscle spasms). 45 tablet 3    multivitamin with minerals tablet Take 1 tablet by mouth once daily.       omega 3-dha-epa-fish oil 1,000 mg (120 mg-180 mg) Cap       oxyCODONE-acetaminophen (PERCOCET) 5-325 mg per tablet Take 1 tablet by mouth 3 (three) times daily as needed for Pain. 90 tablet 0    pantoprazole (PROTONIX) 40 MG tablet Take 1 tablet (40 mg total) by mouth once daily. 90 tablet 1    sucralfate (CARAFATE) 1 gram tablet Take 1 tablet (1 g total) by mouth 4 (four) times daily before meals and nightly. 360 tablet 1     No current facility-administered medications for this visit.        Family History   Problem Relation Age of Onset    No Known Problems Mother     No Known Problems Father        Social History     Socioeconomic History    Marital status: Single     Spouse name: Not on file    Number of children: 2    Years of education: Not on file    Highest education level: Not on file   Occupational History     Comment: retired  and cook   Social Needs    Financial resource strain: Not on file    Food insecurity:     Worry: Not on file     Inability: Not on file    Transportation needs:     Medical: Not on file     Non-medical: Not on file   Tobacco Use    Smoking status: Former Smoker     Packs/day: 0.25     Years: 50.00     Pack years: 12.50     Types: Cigarettes     Last attempt to quit: 2020     Years since quittin.3    Smokeless tobacco: Never Used   Substance and Sexual Activity    Alcohol use: Not Currently    Drug use: No    Sexual  activity: Not Currently     Partners: Male   Lifestyle    Physical activity:     Days per week: Not on file     Minutes per session: Not on file    Stress: Not on file   Relationships    Social connections:     Talks on phone: Not on file     Gets together: Not on file     Attends Sabianism service: Not on file     Active member of club or organization: Not on file     Attends meetings of clubs or organizations: Not on file     Relationship status: Not on file   Other Topics Concern    Not on file   Social History Narrative    Not on file       Objective:     PHYSICAL EXAMINATION:  Voice normal.  Normal affect without evidence of distress.  No physical exam performed due to audio visit only.        Previous physical exam:  GEN:  Well developed, well nourished.  No acute distress. No pain behavior.  HEENT:  No trauma.  Mucous membranes moist.  Nares patent bilaterally.  PSYCH: Normal affect. Thought content appropriate.  CHEST:  Breathing symmetric.  No audible wheezing.  ABD:   · Soft, non-distended.    SKIN:  Warm, pink, dry.  No rash on exposed areas.    EXT:  No erythema noted TTP over lateral aspect of knee, 5/5 stregnth in BLE muscles groups, 2+ reflexes bilaterally  No color change or changes in nail or hair growth.  NEURO/MUSCULOSKELETAL:  Fully alert, oriented, and appropriate. Speech normal francesca. No cranial nerve deficits.   Gait: in wheelchair, guarding of the LLE  Motor Strength: 5/5 motor strength throughout lower extremities.   Sensory: hyperesthesia mid left lateral thigh, small area of numbness left lateral knee. Negative straight leg raise bilaterally.     Left knee  Inspection:  No erythema or redness. No bruising.  ROM: Full ROM but with pain on flexion and extension.   Palpation: No pes anserine tenderness and positive crepitus. Pain with palpation over medial and lateral joint line of left knee. Difficult to assess ligamentous laxity due to habitus.       Imaging:      MRI Left Knee  7/21/2017 (in media):  The medial meniscus is intact. The lateral meniscus is intact.     A chronic complete ACL tear is noted. The posterior cruciate ligament is intact. The medial and lateral retinacula are intact. The medial collateral ligament is intact. The lateral collateral ligament in intact. The posterolateral corner structures are intact.     There is full thickness fissuring of the central aspect medial femoral cartilage. The lateral tibiofemoral compartment cartilage is intact. There is broad thickness defect within the central trochlear cartilage. There is mild lateral patellofemoral cartilage thinning and regional full thickness loss of the central superior patellar cartilage.     A small effusion is present. There is trace infrapatellar fluid. Tiny popliteal cyst is noted. Subtle focal marrow edema is seen within the posterior tibial plateau. The bone marrow signal is heterogeneous likely reflecting marrow reconversion.     There is trace pes anserine bursitis. The tendons are otherwise normal.     Grade 2 fatty streaking of the semimembranosus and vastus lateralis muscles are noted. There is mild prepatellar edema.     MRI Right Shoulder 7/21/2017 (in media):   There is a complete tear of the supraspinatus tendon with retraction to the glenohumeral joint. There is partial tearing of the anterior infraspinatus articular surface. Mild subscapularis tendinosis is noted. The teres minor is intact. A small amount of subacromial/subdeltoid fluid is noted. The proximal long head biceps tendon is poorly visualized proximally although intact distally.     Degenerative tearing of the anterosuperior through posterosuperior labrum is noted. There is moderate to severe superior glenoid cartilage thinning with subchondral degenerative changes. The glenohumeral ligaments appear grossly intact.     An os acromiale is seen with fluid and osteophytosis at its junction with the base of the acromion. There is moderate  superior subluxation of the AC joint with mild osteophytosis.     Small effusion with subcoracoid extension is noted.     There is grade 2/3 fatty atrophy of the supraspinatus muscle, grade 2 of the infraspinatus and subscapularis. Heterogeneous signal is seen throughout the bone marrow likely reflecting marrow reconversion.       Assessment:     Encounter Diagnoses   Name Primary?    Chronic pain disorder Yes    Primary osteoarthritis of left knee     Chronic pain of left knee        Plan:     Ca was seen today for knee pain.    Diagnoses and all orders for this visit:    Chronic pain disorder    Primary osteoarthritis of left knee    Chronic pain of left knee       Her pain is consistent with the above.    We discussed the assessment and recommendations.  All available images were reviewed. We discussed the disease process, prognosis, treatment plan, and risks and benefits. The patient is aware of the risks and benefits of the medications being prescribed, common side effects, and proper usage. The following is the plan we agreed on:     1. Consider repeat genicular radiofrequency ablation for her knee in the future. We will wait at this time due to COVID concerns.   2. Patient has tolerated opioids in the past and notes improved function and quality of life while taking opioids for pain for debilitating knee pain. She is a candidate for palliative care at this time.   3. Until we are able to meet in person to reassess, we will temporarily increase Percocet 5/325 mg to TID PRN, #90. Refill sent today with appropriate date.   4. The patient is here today for a refill of current pain medications and they believe these provide effective pain control and improvements in quality of life.  The patient notes no serious side effects, and feels the benefits outweigh the risks.  The patient was reminded of the pain contract that they signed previously as well as the risks and benefits of the medication including  possible death.  The updated [place] Board of Pharmacy prescription monitoring program was reviewed, and the patient has been found to be compliant with current treatment plan. Medication management provided by Dr. Vidal.  5. Pain Contract signed 9/4/2019.   6. UDS from 10/4/2019 reviewed and consistent.    7. Continue Gabapentin 600 mg BID.  8. Recommended patient followup with bariatric surgery clinic. Encouraged her to continue working on smoking cessation.   9. RTC in 1 month or sooner if needed.    The above plan and management options were discussed at length with patient. Patient is in agreement with the above and verbalized understanding.     Savanna Hyatt NP  05/01/2020

## 2020-05-04 DIAGNOSIS — K21.9 GASTROESOPHAGEAL REFLUX DISEASE, ESOPHAGITIS PRESENCE NOT SPECIFIED: ICD-10-CM

## 2020-05-04 RX ORDER — PANTOPRAZOLE SODIUM 40 MG/1
40 TABLET, DELAYED RELEASE ORAL DAILY
Qty: 90 TABLET | Refills: 1 | Status: SHIPPED | OUTPATIENT
Start: 2020-05-04 | End: 2020-05-05 | Stop reason: SDUPTHER

## 2020-05-04 NOTE — TELEPHONE ENCOUNTER
----- Message from Rosario Joel sent at 5/4/2020 11:26 AM CDT -----  Contact: Ramandeep with [facility]  Who Called: Ramandeep with [facility]    RX Name and Strength: pantoprazole (PROTONIX) 40 MG tablet    Preferred Pharmacy with phone number: [facility] - [place], [place] - [address]    Best Call Back Number: [phone]    Additional Information: N/A

## 2020-05-05 DIAGNOSIS — K21.9 GASTROESOPHAGEAL REFLUX DISEASE, ESOPHAGITIS PRESENCE NOT SPECIFIED: ICD-10-CM

## 2020-05-05 RX ORDER — PANTOPRAZOLE SODIUM 40 MG/1
40 TABLET, DELAYED RELEASE ORAL DAILY
Qty: 90 TABLET | Refills: 1 | Status: SHIPPED | OUTPATIENT
Start: 2020-05-05 | End: 2021-02-02

## 2020-05-07 DIAGNOSIS — G89.4 CHRONIC PAIN DISORDER: ICD-10-CM

## 2020-05-07 DIAGNOSIS — M25.562 CHRONIC PAIN OF LEFT KNEE: ICD-10-CM

## 2020-05-07 DIAGNOSIS — G89.29 CHRONIC PAIN OF LEFT KNEE: ICD-10-CM

## 2020-05-07 DIAGNOSIS — M17.12 PRIMARY OSTEOARTHRITIS OF LEFT KNEE: ICD-10-CM

## 2020-05-07 RX ORDER — OXYCODONE AND ACETAMINOPHEN 5; 325 MG/1; MG/1
1 TABLET ORAL 3 TIMES DAILY PRN
Qty: 90 TABLET | Refills: 0 | Status: SHIPPED | OUTPATIENT
Start: 2020-05-09 | End: 2020-06-02 | Stop reason: SDUPTHER

## 2020-05-07 NOTE — TELEPHONE ENCOUNTER
Staff contacted the patient to inform her that her request for a refill has been approved and e prescribed over to her requested pharmacy [address].    Patient verbalized understanding and expressed thanks.

## 2020-05-07 NOTE — TELEPHONE ENCOUNTER
----- Message from Arron Wu sent at 5/7/2020 11:56 AM CDT -----  Contact: LU RENAE [[id number]]  Can the clinic reply in [facility]:     Please refill the medication(s) listed below. The patient can be reached at this phone number once it is called into the pharmacy.    Medication #1   oxyCODONE-acetaminophen (PERCOCET) 5-325 mg per tablet    Patient request  90 day supply     Preferred Pharmacy: [facility] [address] [place] [address]

## 2020-05-07 NOTE — TELEPHONE ENCOUNTER
Patient requesting refill on 5/7/20  Last office visit 5/1/20   shows last refill on 4/10/20  Patient does have a pain contract on file with [facility]  Patient last UDS 10/04/19 was consistent with current therapy      Please review and advise.

## 2020-05-25 ENCOUNTER — TELEPHONE (OUTPATIENT)
Dept: BARIATRICS | Facility: CLINIC | Age: 70
End: 2020-05-25

## 2020-05-28 ENCOUNTER — TELEPHONE (OUTPATIENT)
Dept: PAIN MEDICINE | Facility: CLINIC | Age: 70
End: 2020-05-28

## 2020-05-28 NOTE — TELEPHONE ENCOUNTER
----- Message from Rosario Joel sent at 5/28/2020 12:12 PM CDT -----  Contact: LU RENAE [[id number]]    Type:  Patient Returning Call    Who Called: LU RENAE [[id number]]    Who Left Message for Patient: Marta     Does the patient know what this is regarding?: N    Can the clinic reply in [facility]: No    Best Call Back Number: [phone]    Additional Information: N/A

## 2020-05-28 NOTE — TELEPHONE ENCOUNTER
"Staff contacted the patient regarding her concerns.    Patient stated," I am not coming into the hospital please give me a call the date of my appointment.     Patient is scheduled for a virtual audio visit for 6/2/20 1 pm with Savanna Hyatt Np. Patient was informed that Savanna has a 30 minute window from her appointment time.     Patient verbalized understanding and expressed great thanks.   "

## 2020-06-01 ENCOUNTER — PATIENT OUTREACH (OUTPATIENT)
Dept: ADMINISTRATIVE | Facility: OTHER | Age: 70
End: 2020-06-01

## 2020-06-02 ENCOUNTER — OFFICE VISIT (OUTPATIENT)
Dept: PAIN MEDICINE | Facility: CLINIC | Age: 70
End: 2020-06-02
Payer: MEDICARE

## 2020-06-02 DIAGNOSIS — M25.562 CHRONIC PAIN OF LEFT KNEE: ICD-10-CM

## 2020-06-02 DIAGNOSIS — M17.12 PRIMARY OSTEOARTHRITIS OF LEFT KNEE: ICD-10-CM

## 2020-06-02 DIAGNOSIS — G89.4 CHRONIC PAIN DISORDER: Primary | ICD-10-CM

## 2020-06-02 DIAGNOSIS — G89.29 CHRONIC PAIN OF LEFT KNEE: ICD-10-CM

## 2020-06-02 DIAGNOSIS — G89.4 CHRONIC PAIN DISORDER: ICD-10-CM

## 2020-06-02 PROCEDURE — 99441 PR PHYSICIAN TELEPHONE EVALUATION 5-10 MIN: CPT | Mod: HCNC,95,, | Performed by: NURSE PRACTITIONER

## 2020-06-02 PROCEDURE — 99441 PR PHYSICIAN TELEPHONE EVALUATION 5-10 MIN: ICD-10-PCS | Mod: HCNC,95,, | Performed by: NURSE PRACTITIONER

## 2020-06-02 RX ORDER — OXYCODONE AND ACETAMINOPHEN 5; 325 MG/1; MG/1
1 TABLET ORAL 3 TIMES DAILY PRN
Qty: 90 TABLET | Refills: 0 | Status: SHIPPED | OUTPATIENT
Start: 2020-06-09 | End: 2020-07-08 | Stop reason: SDUPTHER

## 2020-06-02 RX ORDER — GABAPENTIN 300 MG/1
600 CAPSULE ORAL 3 TIMES DAILY
Qty: 540 CAPSULE | Refills: 0 | Status: SHIPPED | OUTPATIENT
Start: 2020-06-02 | End: 2020-09-21 | Stop reason: SDUPTHER

## 2020-06-02 NOTE — PROGRESS NOTES
"Subjective:     Patient ID: Ca Coats is a 69 y.o. female.    Chief Complaint: Pain    Consulted by: Self      Disclaimer: This note was generated using voice recognition software.  There may be a typographical errors that were missed during proofreading.    The patient location is: Home   The chief complaint leading to consultation is: back and knee pain   Visit type: Virtual visit with audio only. The reason for the audio only service rather than synchronous audio and video virtual visit was related to technical difficulties or patient preference/necessity.   Total time spent with patient: 9 min   Each patient to whom he or she provides medical services by telemedicine is: (1) informed of the relationship between the physician and patient and the respective role of any other health care provider with respect to management of the patient; and (2) notified that he or she may decline to receive medical services by telemedicine and may withdraw from such care at any time.     HPI:    Ca Coats is a 69 y.o. female who presents today with left knee pain. Her pain has been going on for "too long."  She was previously treated by Dr. Iyer at [facility].  She has injections [id number] with him.  These were helpful, but she does not know if the injections were steroid or viscosupplementation.   This pain is described in detail below.    Interval History (4/30/2018):  The patient returns to clinic today for follow up and records review. We did received records from [facility] including a MRI of her knee. Dr. Iyer's last office visit note from January 2017 does not include any previous injections. She continues to report bilateral knee pain, left greater than right. She describes this pain as constant and aching. This pain is worse with prolonged walking. She also report right shoulder pain with prolonged overhead activity. She continues to take Percocet with benefit. She denies any other health " changes.    Interval History (5/31/2018):  The patient returns to clinic for follow up. She is s/p left knee Synvisc One injection on 5/8/2018. She reports 35% relief of her knee pain. She continues to report bilateral knee pain, left greater than right. She describes this pain as constant and aching. Her pain is worse with prolonged walking and standing. She is scheduled to see Orthopedics at the  End of this month. She is also following up with Bariatrics to discuss the lap band procedure. She continues to take Percocet with benefit. She also uses Voltaren gel with benefit but this is expensive for her. She denies any other health changes.         Interval History (7/2/2018):  The patient returns to clinic today for follow up. She continues to report left knee pain that is constant, sharp, and aching in nature. Her pain is worse with prolonged walking and standing. She is scheduled for weight loss surgery in August. She continues follow up with orthopedics who does not recommend surgery at this time due to her BMI. She continues to take Percocet as needed with benefit. She denies any other health changes.     Interval History (7/19/2018):  The patient returns to clinic today for follow up. She is s/p left genicular nerve block on 7/10/2018. She reports 80% relief of her left knee pain. She reports that she was able to walk for longer distances (3 blocks) after the block. Her pain has returned to baseline. She continues to report left knee pain that is constant, sharp, and aching in nature. Her pain is worse with prolonged walking and standing. She denies any other health changes.     Interval History (8/21/2018):  The patient returns to clinic today for follow up. She is s/p left genicular cooled RFA on 7/31/2018. She reports 60% relief of her left knee pain. She reports intermittent knee pain that is sore and aching in nature. She continues to perform a home exercise routine. She is actively trying to lose  "weight. She continues to follow up with Bariatric Surgery at [facility]. She is considering weight loss surgery. She continues to use compound cream with benefit. She denies any other health changes.     Interval History (11/21/2018):  The patient returns to clinic today for follow up. She reports increased left knee pain this past week. She reports 2 episodes where her knee has "locked" up on her while walking. She describes her knee pain as sore and aching. She is actively trying to lose weight and quit smoking. She continues to use the compound cream with benefit. She denies any other health changes.     Interval History (1/9/2019):  The patient returns to clinic today for follow up. She is s/p left knee steroid injection on 12/21/2018. She reports one day of relief. She continues to report left knee pain that is constant, sharp, and aching in nature. Her pain is worse with standing and walking. She reports that her knee "locks" up on her while walking. She often feels as though she is going to fall. She is scheduled for bariatric weight loss surgery in February at [facility]. She denies any other health changes.     Interval History (2/28/2019):  The patient returns to clinic today for follow up. She is s/p Euflexxa series completed on 1/30/2019. She reports that she able to stand for longer periods of time since the procedure. She continues to report left knee pain that is sore and aching in nature. Her pain is worse with prolonged standing and walking. She was previously scheduled for weight loss surgery but reports this was canceled. She is scheduled for follow up next week about this. She denies any other health changes.     Interval History (4/12/2019):  The patient returns for f/u of left knee pain.  She is s/p left genicular cooled RFA with about 90% pain relief.  She has been able to walk easier.  She also notices less stiffness in her knee.  She is planning on gastric surgery prior to knee replacement.  She had " benefit with compounding cream but it is no longer covered by her insurance.  She does take Percocet from her PCP.  Her pain today is 7/10.    Interval History (7/11/19):  Patient reported to ED today for increased L knee pain and an episode of LLE weakness that lead to a near fall. Patient is s/p L genicular RFA in 3/19/19 that provided 90% relief for 2 months then came back. Pain is a crunchy pain, in left knee, that does not radiate, worse with movement, better with rest. Pain today is 9/10. She normally uses cane to ambulate but is currently using wheelchair at hospital. Denies any trauma ot the area, fever/chills, n/v, abdominal pain, or HA.    Interval History (8/8/2019):  She returns today for follow up s/p left knee genicular chemodenervation with phenol 7/19/19.  She reports that this procedure did not provide any improvement in her left knee symptoms, and she is still having significant difficulty with ambulation, still uses wheelchair for mobility. She also reports left lateral thigh and hip pain lateral upper thigh numbness. She has been admitted to [facility] but is not receiving PT there. She is still considering lap band surgery but has missed a followup appointment.     Interval History (9/4/2019):  The patient returns to clinic today for follow up. She continues to report intermittent left lateral thigh and hip pain that is tingling and numb in nature. She continues to report left knee pain. She continues to have difficulty ambulating due to pain. She is currently living in a SNF. She continues to take Gabapentin 300 mg BID. This was increased at last visit but not increased at the nursing home. She does report benefit with Percocet though it only last approximately 4-5 hours. She denies any adverse effects. She does present with a care attendant from the SNF today. Her pain today is 10/10.    Interval History (10/4/2019):  The patient returns to clinic today for follow up and medication  refill. She continues to report bilateral knee pain that is aching in nature. She reports intermittent left lateral thigh pain that is tingling and shooting in nature. She reports that her pain has been improving since last visit. She has increased her activity. She continues to report benefit with Gabapentin. She continues to take Percocet as needed with benefit. She denies any other health changes. Her pain today is 0/10.    Interval History (1/6/2020):  The patient returns to clinic today for follow up. She reports increased left leg pain today that is tingling in nature. She continues to report bilateral knee pain, left greater than right. She describes this pain as constant, sharp, and aching. She is no longer living at the nursing facility. She has increased her home exercise routine. She is actively trying to quit smoking in order to move forward with bariatric surgery. She continues to report benefit with current medication regimen. She takes Gabapentin and Percocet with benefit and without adverse effect. She denies any other health changes. Her pain today is 7/10.    Interval History (4/8/2020):  She returns today for follow up via audio.  She reports that the methocarbamol is not helping.  She continues to have a left-sided low back pain that is bothering her.  Her left knee continues to hurt.  Her right knee is hurting a little as well.  Percocet continues to help, but it is not lasting long enough.    Interval History (5/1/2020):  The patient returns for audio visit today for follow up. She continues to report bilateral knee pain, left greater than right. Her pain is worse with prolonged standing and walking. She reports that her back pain has improved. She continues to report benefit with current medication regimen. She continues to take Gabapentin and Percocet with benefit and without adverse effects. She denies any other health changes. Her pain today is 8/10.    Interval History (6/2/2020):  The  patient returns to clinic today via audio visit for follow up of knee pain. She reports increased left knee pain in the last week. Her pain is worse with standing and walking. She feels as though she will fall when standing. She is currently using ice, heat, and OTC lidocaine patches with limited relief. She continues to take Gabapentin and Percocet with some benefit. She denies any adverse effects. She denies any other health changes. Her pain today is 10/10.    Physical Therapy: Yes, she did this in 2018 for one month (twice a week). She does HEP (stretching and ROM in the morning)    Non-pharmacologic Treatment:     · Ice/Heat: Heat is more helpful than ice  · TENS: Not tried  · Massage: Not tried  · Chiropractic care: Not tried  · Acupuncture: Not tried  · Other: Uses a cane         Pain Medications:         · Currently taking: Gabapentin, tylenol 500mg Q6 prn, Percocet 5/325 mg TID PRN    · Has tried in the past:  Ibuprofen (still takes sometimes), Voltaren gel    · Has not tried: Muscle relaxants, TCAs, SNRIs, Lyrica, compound topical creams    Blood thinners: None    Interventional Therapies: [id number] injections by Dr. Iyer were helpful  · 5/8/2018- Left knee Synvisc One injection  · 7/10/2018- Left genicular nerve block  · 7/31/2018- Left genicular cooled RFA  · 12/21/2018- Left knee steroid injection  · 1/31/2019- Left knee Euflexxa series  · 3/19/2019 Left genicular cooled RFA- 90% relief  · 7/19/19- Left knee genicular phenol chemodenervation-   ·     Relevant Surgeries: None    Affecting sleep? No    Affecting daily activities? Yes    Depressive symptoms? No          · SI/HI? No    Work status: No, on disability due to her knee    Prescription Monitoring Program database:  Reviewed and consistent with medication use as prescribed.      Last 3 PDI Scores 1/6/2020 10/4/2019 9/4/2019   Pain Disability Index (PDI) 38 0 70   ]    GENERAL: No weight loss, malaise or fevers.  HEENT:   No recent changes in  vision or hearing  NECK:  Negative for lumps, no difficulty with swallowing.  RESPIRATORY:  Negative for cough, wheezing or shortness of breath, patient denies any recent URI.  CARDIOVASCULAR:  Negative for chest pain, leg swelling or palpitations.  GI:  Negative for abdominal discomfort, blood in stools or black stools or change in bowel habits.  MUSCULOSKELETAL:  See HPI.  SKIN:  Negative for lesions, rash, and itching.  PSYCH:  Negative for mood disorder or recent psychosocial stressors.    HEMATOLOGY/LYMPHOLOGY:  Negative for prolonged bleeding, bruising easily or swollen nodes.    ENDO: No history of diabetes or thyroid dysfunction  NEURO:   No history of headaches, syncope, paralysis, seizures or tremors.  All other reviewed and negative other than HPI.          Past Medical History:   Diagnosis Date    Anemia     2018    Arthritis     BMI 60.0-69.9, adult     Cataract     Depression     Dry eye syndrome     GERD (gastroesophageal reflux disease)     Glaucoma suspect     History of gastric ulcer     Hyperlipidemia     Hypertension     Hypothyroidism     Morbid obesity     Neuromuscular disorder     Thyroid disease        Past Surgical History:   Procedure Laterality Date    APPENDECTOMY      CATARACT EXTRACTION W/  INTRAOCULAR LENS IMPLANT Bilateral     MFIOL OU    INJECTION OF ANESTHETIC AGENT AROUND NERVE Left 7/10/2018    Procedure: BLOCK, NERVE;  Surgeon: Samantha Vidal MD;  Location: [facility];  Service: Pain Management;  Laterality: Left;  Genicular     INJECTION OF ANESTHETIC AGENT AROUND NERVE Left 7/19/2019    Procedure: Block, Nerve NERVE BLOCK GENICULAR WITH PHENOL 6%;  Surgeon: Samantha Vidal MD;  Location: [facility];  Service: Pain Management;  Laterality: Left;  NEEDS CONSENT       Review of patient's allergies indicates:  No Known Allergies    Current Outpatient Medications   Medication Sig Dispense Refill    acetaminophen (TYLENOL) 500 MG tablet       amLODIPine  (NORVASC) 10 MG tablet Take 1 tablet (10 mg total) by mouth once daily. 90 tablet 1    amoxicillin (AMOXIL) 500 MG capsule Take 1 capsule (500 mg total) by mouth 2 (two) times a day. 20 capsule 0    atorvastatin (LIPITOR) 40 MG tablet Take 1 tablet (40 mg total) by mouth once daily. 90 tablet 3    B-complex with vitamin C (Z-BEC OR EQUIV) tablet Take 1 tablet by mouth once daily.       CALCIUM CITRATE-VITAMIN D2 ORAL Take 1 tablet by mouth once daily.       ferrous gluconate 324 mg (37.5 mg iron) Tab tablet Take 1 tablet (324 mg total) by mouth daily with breakfast. 90 tablet 4    gabapentin (NEURONTIN) 300 MG capsule Take 2 capsules (600 mg total) by mouth 2 (two) times daily. 360 capsule 2    lisinopriL (PRINIVIL,ZESTRIL) 40 MG tablet Take 1 tablet (40 mg total) by mouth once daily. 90 tablet 1    multivitamin with minerals tablet Take 1 tablet by mouth once daily.       omega 3-dha-epa-fish oil 1,000 mg (120 mg-180 mg) Cap       oxyCODONE-acetaminophen (PERCOCET) 5-325 mg per tablet Take 1 tablet by mouth 3 (three) times daily as needed for Pain. 90 tablet 0    pantoprazole (PROTONIX) 40 MG tablet Take 1 tablet (40 mg total) by mouth once daily. 90 tablet 1    sucralfate (CARAFATE) 1 gram tablet Take 1 tablet (1 g total) by mouth 4 (four) times daily before meals and nightly. 360 tablet 1     No current facility-administered medications for this visit.        Family History   Problem Relation Age of Onset    No Known Problems Mother     No Known Problems Father        Social History     Socioeconomic History    Marital status: Single     Spouse name: Not on file    Number of children: 2    Years of education: Not on file    Highest education level: Not on file   Occupational History     Comment: retired  and cook   Social Needs    Financial resource strain: Not on file    Food insecurity:     Worry: Not on file     Inability: Not on file    Transportation needs:     Medical: Not on  file     Non-medical: Not on file   Tobacco Use    Smoking status: Former Smoker     Packs/day: 0.25     Years: 50.00     Pack years: 12.50     Types: Cigarettes     Last attempt to quit: 2020     Years since quittin.4    Smokeless tobacco: Never Used   Substance and Sexual Activity    Alcohol use: Not Currently    Drug use: No    Sexual activity: Not Currently     Partners: Male   Lifestyle    Physical activity:     Days per week: Not on file     Minutes per session: Not on file    Stress: Not on file   Relationships    Social connections:     Talks on phone: Not on file     Gets together: Not on file     Attends Oriental orthodox service: Not on file     Active member of club or organization: Not on file     Attends meetings of clubs or organizations: Not on file     Relationship status: Not on file   Other Topics Concern    Not on file   Social History Narrative    Not on file       Objective:     PHYSICAL EXAMINATION:  Voice normal.  Normal affect without evidence of distress.  No physical exam performed due to audio visit only.        Previous physical exam:  GEN:  Well developed, well nourished.  No acute distress. No pain behavior.  HEENT:  No trauma.  Mucous membranes moist.  Nares patent bilaterally.  PSYCH: Normal affect. Thought content appropriate.  CHEST:  Breathing symmetric.  No audible wheezing.  ABD:   · Soft, non-distended.    SKIN:  Warm, pink, dry.  No rash on exposed areas.    EXT:  No erythema noted TTP over lateral aspect of knee, 5/5 stregnth in BLE muscles groups, 2+ reflexes bilaterally  No color change or changes in nail or hair growth.  NEURO/MUSCULOSKELETAL:  Fully alert, oriented, and appropriate. Speech normal francesca. No cranial nerve deficits.   Gait: in wheelchair, guarding of the LLE  Motor Strength: 5/5 motor strength throughout lower extremities.   Sensory: hyperesthesia mid left lateral thigh, small area of numbness left lateral knee. Negative straight leg raise  bilaterally.     Left knee  Inspection:  No erythema or redness. No bruising.  ROM: Full ROM but with pain on flexion and extension.   Palpation: No pes anserine tenderness and positive crepitus. Pain with palpation over medial and lateral joint line of left knee. Difficult to assess ligamentous laxity due to habitus.       Imaging:      MRI Left Knee 7/21/2017 (in media):  The medial meniscus is intact. The lateral meniscus is intact.     A chronic complete ACL tear is noted. The posterior cruciate ligament is intact. The medial and lateral retinacula are intact. The medial collateral ligament is intact. The lateral collateral ligament in intact. The posterolateral corner structures are intact.     There is full thickness fissuring of the central aspect medial femoral cartilage. The lateral tibiofemoral compartment cartilage is intact. There is broad thickness defect within the central trochlear cartilage. There is mild lateral patellofemoral cartilage thinning and regional full thickness loss of the central superior patellar cartilage.     A small effusion is present. There is trace infrapatellar fluid. Tiny popliteal cyst is noted. Subtle focal marrow edema is seen within the posterior tibial plateau. The bone marrow signal is heterogeneous likely reflecting marrow reconversion.     There is trace pes anserine bursitis. The tendons are otherwise normal.     Grade 2 fatty streaking of the semimembranosus and vastus lateralis muscles are noted. There is mild prepatellar edema.     MRI Right Shoulder 7/21/2017 (in media):   There is a complete tear of the supraspinatus tendon with retraction to the glenohumeral joint. There is partial tearing of the anterior infraspinatus articular surface. Mild subscapularis tendinosis is noted. The teres minor is intact. A small amount of subacromial/subdeltoid fluid is noted. The proximal long head biceps tendon is poorly visualized proximally although intact distally.      Degenerative tearing of the anterosuperior through posterosuperior labrum is noted. There is moderate to severe superior glenoid cartilage thinning with subchondral degenerative changes. The glenohumeral ligaments appear grossly intact.     An os acromiale is seen with fluid and osteophytosis at its junction with the base of the acromion. There is moderate superior subluxation of the AC joint with mild osteophytosis.     Small effusion with subcoracoid extension is noted.     There is grade 2/3 fatty atrophy of the supraspinatus muscle, grade 2 of the infraspinatus and subscapularis. Heterogeneous signal is seen throughout the bone marrow likely reflecting marrow reconversion.       Assessment:     Encounter Diagnoses   Name Primary?    Chronic pain disorder Yes    Chronic pain of left knee     Primary osteoarthritis of left knee        Plan:     Diagnoses and all orders for this visit:    Chronic pain disorder    Chronic pain of left knee    Primary osteoarthritis of left knee       Her pain is consistent with the above.    We discussed the assessment and recommendations.  All available images were reviewed. We discussed the disease process, prognosis, treatment plan, and risks and benefits. The patient is aware of the risks and benefits of the medications being prescribed, common side effects, and proper usage. The following is the plan we agreed on:     1. Schedule for left genicular RFA. This is time sensitive. We discussed that she will established care with Dr. Clark, since Dr. Vidal is no longer with [facility].  2. Patient has tolerated opioids in the past and notes improved function and quality of life while taking opioids for pain for debilitating knee pain. She is a candidate for palliative care at this time.   3. Until we are able to meet in person to reassess, we will temporarily increase Percocet 5/325 mg to TID PRN, #90. Refill sent today with appropriate date.   4. The patient is here today  for a refill of current pain medications and they believe these provide effective pain control and improvements in quality of life.  The patient notes no serious side effects, and feels the benefits outweigh the risks.  The patient was reminded of the pain contract that they signed previously as well as the risks and benefits of the medication including possible death.  The updated [place] Board of [employer] prescription monitoring program was reviewed, and the patient has been found to be compliant with current treatment plan. Medication management provided by Dr. Clark.   5. Pain Contract signed 9/4/2019.   6. UDS from 10/4/2019 reviewed and consistent.    7. Increase Gabapentin to 600 mg TID.  8. Recommended patient followup with bariatric surgery clinic. Encouraged her to continue working on smoking cessation.   9. RTC 2 weeks after above procedure.     The above plan and management options were discussed at length with patient. Patient is in agreement with the above and verbalized understanding.     Savanna Hyatt NP  06/02/2020

## 2020-06-02 NOTE — H&P (VIEW-ONLY)
"Subjective:     Patient ID: Ca Coats is a 69 y.o. female.    Chief Complaint: Pain    Consulted by: Self      Disclaimer: This note was generated using voice recognition software.  There may be a typographical errors that were missed during proofreading.    The patient location is: Home   The chief complaint leading to consultation is: back and knee pain   Visit type: Virtual visit with audio only. The reason for the audio only service rather than synchronous audio and video virtual visit was related to technical difficulties or patient preference/necessity.   Total time spent with patient: 9 min   Each patient to whom he or she provides medical services by telemedicine is: (1) informed of the relationship between the physician and patient and the respective role of any other health care provider with respect to management of the patient; and (2) notified that he or she may decline to receive medical services by telemedicine and may withdraw from such care at any time.     HPI:    Ca Coats is a 69 y.o. female who presents today with left knee pain. Her pain has been going on for "too long."  She was previously treated by Dr. Iyer at [facility].  She has injections [id number] with him.  These were helpful, but she does not know if the injections were steroid or viscosupplementation.   This pain is described in detail below.    Interval History (4/30/2018):  The patient returns to clinic today for follow up and records review. We did received records from [facility] including a MRI of her knee. Dr. Iyer's last office visit note from January 2017 does not include any previous injections. She continues to report bilateral knee pain, left greater than right. She describes this pain as constant and aching. This pain is worse with prolonged walking. She also report right shoulder pain with prolonged overhead activity. She continues to take Percocet with benefit. She denies any other health " changes.    Interval History (5/31/2018):  The patient returns to clinic for follow up. She is s/p left knee Synvisc One injection on 5/8/2018. She reports 35% relief of her knee pain. She continues to report bilateral knee pain, left greater than right. She describes this pain as constant and aching. Her pain is worse with prolonged walking and standing. She is scheduled to see Orthopedics at the  End of this month. She is also following up with Bariatrics to discuss the lap band procedure. She continues to take Percocet with benefit. She also uses Voltaren gel with benefit but this is expensive for her. She denies any other health changes.         Interval History (7/2/2018):  The patient returns to clinic today for follow up. She continues to report left knee pain that is constant, sharp, and aching in nature. Her pain is worse with prolonged walking and standing. She is scheduled for weight loss surgery in August. She continues follow up with orthopedics who does not recommend surgery at this time due to her BMI. She continues to take Percocet as needed with benefit. She denies any other health changes.     Interval History (7/19/2018):  The patient returns to clinic today for follow up. She is s/p left genicular nerve block on 7/10/2018. She reports 80% relief of her left knee pain. She reports that she was able to walk for longer distances (3 blocks) after the block. Her pain has returned to baseline. She continues to report left knee pain that is constant, sharp, and aching in nature. Her pain is worse with prolonged walking and standing. She denies any other health changes.     Interval History (8/21/2018):  The patient returns to clinic today for follow up. She is s/p left genicular cooled RFA on 7/31/2018. She reports 60% relief of her left knee pain. She reports intermittent knee pain that is sore and aching in nature. She continues to perform a home exercise routine. She is actively trying to lose  "weight. She continues to follow up with Bariatric Surgery at [facility]. She is considering weight loss surgery. She continues to use compound cream with benefit. She denies any other health changes.     Interval History (11/21/2018):  The patient returns to clinic today for follow up. She reports increased left knee pain this past week. She reports 2 episodes where her knee has "locked" up on her while walking. She describes her knee pain as sore and aching. She is actively trying to lose weight and quit smoking. She continues to use the compound cream with benefit. She denies any other health changes.     Interval History (1/9/2019):  The patient returns to clinic today for follow up. She is s/p left knee steroid injection on 12/21/2018. She reports one day of relief. She continues to report left knee pain that is constant, sharp, and aching in nature. Her pain is worse with standing and walking. She reports that her knee "locks" up on her while walking. She often feels as though she is going to fall. She is scheduled for bariatric weight loss surgery in February at [facility]. She denies any other health changes.     Interval History (2/28/2019):  The patient returns to clinic today for follow up. She is s/p Euflexxa series completed on 1/30/2019. She reports that she able to stand for longer periods of time since the procedure. She continues to report left knee pain that is sore and aching in nature. Her pain is worse with prolonged standing and walking. She was previously scheduled for weight loss surgery but reports this was canceled. She is scheduled for follow up next week about this. She denies any other health changes.     Interval History (4/12/2019):  The patient returns for f/u of left knee pain.  She is s/p left genicular cooled RFA with about 90% pain relief.  She has been able to walk easier.  She also notices less stiffness in her knee.  She is planning on gastric surgery prior to knee replacement.  She had " benefit with compounding cream but it is no longer covered by her insurance.  She does take Percocet from her PCP.  Her pain today is 7/10.    Interval History (7/11/19):  Patient reported to ED today for increased L knee pain and an episode of LLE weakness that lead to a near fall. Patient is s/p L genicular RFA in 3/19/19 that provided 90% relief for 2 months then came back. Pain is a crunchy pain, in left knee, that does not radiate, worse with movement, better with rest. Pain today is 9/10. She normally uses cane to ambulate but is currently using wheelchair at hospital. Denies any trauma ot the area, fever/chills, n/v, abdominal pain, or HA.    Interval History (8/8/2019):  She returns today for follow up s/p left knee genicular chemodenervation with phenol 7/19/19.  She reports that this procedure did not provide any improvement in her left knee symptoms, and she is still having significant difficulty with ambulation, still uses wheelchair for mobility. She also reports left lateral thigh and hip pain lateral upper thigh numbness. She has been admitted to [facility] but is not receiving PT there. She is still considering lap band surgery but has missed a followup appointment.     Interval History (9/4/2019):  The patient returns to clinic today for follow up. She continues to report intermittent left lateral thigh and hip pain that is tingling and numb in nature. She continues to report left knee pain. She continues to have difficulty ambulating due to pain. She is currently living in a SNF. She continues to take Gabapentin 300 mg BID. This was increased at last visit but not increased at the nursing home. She does report benefit with Percocet though it only last approximately 4-5 hours. She denies any adverse effects. She does present with a care attendant from the SNF today. Her pain today is 10/10.    Interval History (10/4/2019):  The patient returns to clinic today for follow up and medication  refill. She continues to report bilateral knee pain that is aching in nature. She reports intermittent left lateral thigh pain that is tingling and shooting in nature. She reports that her pain has been improving since last visit. She has increased her activity. She continues to report benefit with Gabapentin. She continues to take Percocet as needed with benefit. She denies any other health changes. Her pain today is 0/10.    Interval History (1/6/2020):  The patient returns to clinic today for follow up. She reports increased left leg pain today that is tingling in nature. She continues to report bilateral knee pain, left greater than right. She describes this pain as constant, sharp, and aching. She is no longer living at the nursing facility. She has increased her home exercise routine. She is actively trying to quit smoking in order to move forward with bariatric surgery. She continues to report benefit with current medication regimen. She takes Gabapentin and Percocet with benefit and without adverse effect. She denies any other health changes. Her pain today is 7/10.    Interval History (4/8/2020):  She returns today for follow up via audio.  She reports that the methocarbamol is not helping.  She continues to have a left-sided low back pain that is bothering her.  Her left knee continues to hurt.  Her right knee is hurting a little as well.  Percocet continues to help, but it is not lasting long enough.    Interval History (5/1/2020):  The patient returns for audio visit today for follow up. She continues to report bilateral knee pain, left greater than right. Her pain is worse with prolonged standing and walking. She reports that her back pain has improved. She continues to report benefit with current medication regimen. She continues to take Gabapentin and Percocet with benefit and without adverse effects. She denies any other health changes. Her pain today is 8/10.    Interval History (6/2/2020):  The  patient returns to clinic today via audio visit for follow up of knee pain. She reports increased left knee pain in the last week. Her pain is worse with standing and walking. She feels as though she will fall when standing. She is currently using ice, heat, and OTC lidocaine patches with limited relief. She continues to take Gabapentin and Percocet with some benefit. She denies any adverse effects. She denies any other health changes. Her pain today is 10/10.    Physical Therapy: Yes, she did this in 2018 for one month (twice a week). She does HEP (stretching and ROM in the morning)    Non-pharmacologic Treatment:     · Ice/Heat: Heat is more helpful than ice  · TENS: Not tried  · Massage: Not tried  · Chiropractic care: Not tried  · Acupuncture: Not tried  · Other: Uses a cane         Pain Medications:         · Currently taking: Gabapentin, tylenol 500mg Q6 prn, Percocet 5/325 mg TID PRN    · Has tried in the past:  Ibuprofen (still takes sometimes), Voltaren gel    · Has not tried: Muscle relaxants, TCAs, SNRIs, Lyrica, compound topical creams    Blood thinners: None    Interventional Therapies: [id number] injections by Dr. Iyer were helpful  · 5/8/2018- Left knee Synvisc One injection  · 7/10/2018- Left genicular nerve block  · 7/31/2018- Left genicular cooled RFA  · 12/21/2018- Left knee steroid injection  · 1/31/2019- Left knee Euflexxa series  · 3/19/2019 Left genicular cooled RFA- 90% relief  · 7/19/19- Left knee genicular phenol chemodenervation-   ·     Relevant Surgeries: None    Affecting sleep? No    Affecting daily activities? Yes    Depressive symptoms? No          · SI/HI? No    Work status: No, on disability due to her knee    Prescription Monitoring Program database:  Reviewed and consistent with medication use as prescribed.      Last 3 PDI Scores 1/6/2020 10/4/2019 9/4/2019   Pain Disability Index (PDI) 38 0 70   ]    GENERAL: No weight loss, malaise or fevers.  HEENT:   No recent changes in  vision or hearing  NECK:  Negative for lumps, no difficulty with swallowing.  RESPIRATORY:  Negative for cough, wheezing or shortness of breath, patient denies any recent URI.  CARDIOVASCULAR:  Negative for chest pain, leg swelling or palpitations.  GI:  Negative for abdominal discomfort, blood in stools or black stools or change in bowel habits.  MUSCULOSKELETAL:  See HPI.  SKIN:  Negative for lesions, rash, and itching.  PSYCH:  Negative for mood disorder or recent psychosocial stressors.    HEMATOLOGY/LYMPHOLOGY:  Negative for prolonged bleeding, bruising easily or swollen nodes.    ENDO: No history of diabetes or thyroid dysfunction  NEURO:   No history of headaches, syncope, paralysis, seizures or tremors.  All other reviewed and negative other than HPI.          Past Medical History:   Diagnosis Date    Anemia     2018    Arthritis     BMI 60.0-69.9, adult     Cataract     Depression     Dry eye syndrome     GERD (gastroesophageal reflux disease)     Glaucoma suspect     History of gastric ulcer     Hyperlipidemia     Hypertension     Hypothyroidism     Morbid obesity     Neuromuscular disorder     Thyroid disease        Past Surgical History:   Procedure Laterality Date    APPENDECTOMY      CATARACT EXTRACTION W/  INTRAOCULAR LENS IMPLANT Bilateral     MFIOL OU    INJECTION OF ANESTHETIC AGENT AROUND NERVE Left 7/10/2018    Procedure: BLOCK, NERVE;  Surgeon: Samantha Vidal MD;  Location: [facility];  Service: Pain Management;  Laterality: Left;  Genicular     INJECTION OF ANESTHETIC AGENT AROUND NERVE Left 7/19/2019    Procedure: Block, Nerve NERVE BLOCK GENICULAR WITH PHENOL 6%;  Surgeon: Samantha Vidal MD;  Location: [facility];  Service: Pain Management;  Laterality: Left;  NEEDS CONSENT       Review of patient's allergies indicates:  No Known Allergies    Current Outpatient Medications   Medication Sig Dispense Refill    acetaminophen (TYLENOL) 500 MG tablet       amLODIPine  (NORVASC) 10 MG tablet Take 1 tablet (10 mg total) by mouth once daily. 90 tablet 1    amoxicillin (AMOXIL) 500 MG capsule Take 1 capsule (500 mg total) by mouth 2 (two) times a day. 20 capsule 0    atorvastatin (LIPITOR) 40 MG tablet Take 1 tablet (40 mg total) by mouth once daily. 90 tablet 3    B-complex with vitamin C (Z-BEC OR EQUIV) tablet Take 1 tablet by mouth once daily.       CALCIUM CITRATE-VITAMIN D2 ORAL Take 1 tablet by mouth once daily.       ferrous gluconate 324 mg (37.5 mg iron) Tab tablet Take 1 tablet (324 mg total) by mouth daily with breakfast. 90 tablet 4    gabapentin (NEURONTIN) 300 MG capsule Take 2 capsules (600 mg total) by mouth 2 (two) times daily. 360 capsule 2    lisinopriL (PRINIVIL,ZESTRIL) 40 MG tablet Take 1 tablet (40 mg total) by mouth once daily. 90 tablet 1    multivitamin with minerals tablet Take 1 tablet by mouth once daily.       omega 3-dha-epa-fish oil 1,000 mg (120 mg-180 mg) Cap       oxyCODONE-acetaminophen (PERCOCET) 5-325 mg per tablet Take 1 tablet by mouth 3 (three) times daily as needed for Pain. 90 tablet 0    pantoprazole (PROTONIX) 40 MG tablet Take 1 tablet (40 mg total) by mouth once daily. 90 tablet 1    sucralfate (CARAFATE) 1 gram tablet Take 1 tablet (1 g total) by mouth 4 (four) times daily before meals and nightly. 360 tablet 1     No current facility-administered medications for this visit.        Family History   Problem Relation Age of Onset    No Known Problems Mother     No Known Problems Father        Social History     Socioeconomic History    Marital status: Single     Spouse name: Not on file    Number of children: 2    Years of education: Not on file    Highest education level: Not on file   Occupational History     Comment: retired  and cook   Social Needs    Financial resource strain: Not on file    Food insecurity:     Worry: Not on file     Inability: Not on file    Transportation needs:     Medical: Not on  file     Non-medical: Not on file   Tobacco Use    Smoking status: Former Smoker     Packs/day: 0.25     Years: 50.00     Pack years: 12.50     Types: Cigarettes     Last attempt to quit: 2020     Years since quittin.4    Smokeless tobacco: Never Used   Substance and Sexual Activity    Alcohol use: Not Currently    Drug use: No    Sexual activity: Not Currently     Partners: Male   Lifestyle    Physical activity:     Days per week: Not on file     Minutes per session: Not on file    Stress: Not on file   Relationships    Social connections:     Talks on phone: Not on file     Gets together: Not on file     Attends Sabianist service: Not on file     Active member of club or organization: Not on file     Attends meetings of clubs or organizations: Not on file     Relationship status: Not on file   Other Topics Concern    Not on file   Social History Narrative    Not on file       Objective:     PHYSICAL EXAMINATION:  Voice normal.  Normal affect without evidence of distress.  No physical exam performed due to audio visit only.        Previous physical exam:  GEN:  Well developed, well nourished.  No acute distress. No pain behavior.  HEENT:  No trauma.  Mucous membranes moist.  Nares patent bilaterally.  PSYCH: Normal affect. Thought content appropriate.  CHEST:  Breathing symmetric.  No audible wheezing.  ABD:   · Soft, non-distended.    SKIN:  Warm, pink, dry.  No rash on exposed areas.    EXT:  No erythema noted TTP over lateral aspect of knee, 5/5 stregnth in BLE muscles groups, 2+ reflexes bilaterally  No color change or changes in nail or hair growth.  NEURO/MUSCULOSKELETAL:  Fully alert, oriented, and appropriate. Speech normal francesca. No cranial nerve deficits.   Gait: in wheelchair, guarding of the LLE  Motor Strength: 5/5 motor strength throughout lower extremities.   Sensory: hyperesthesia mid left lateral thigh, small area of numbness left lateral knee. Negative straight leg raise  bilaterally.     Left knee  Inspection:  No erythema or redness. No bruising.  ROM: Full ROM but with pain on flexion and extension.   Palpation: No pes anserine tenderness and positive crepitus. Pain with palpation over medial and lateral joint line of left knee. Difficult to assess ligamentous laxity due to habitus.       Imaging:      MRI Left Knee 7/21/2017 (in media):  The medial meniscus is intact. The lateral meniscus is intact.     A chronic complete ACL tear is noted. The posterior cruciate ligament is intact. The medial and lateral retinacula are intact. The medial collateral ligament is intact. The lateral collateral ligament in intact. The posterolateral corner structures are intact.     There is full thickness fissuring of the central aspect medial femoral cartilage. The lateral tibiofemoral compartment cartilage is intact. There is broad thickness defect within the central trochlear cartilage. There is mild lateral patellofemoral cartilage thinning and regional full thickness loss of the central superior patellar cartilage.     A small effusion is present. There is trace infrapatellar fluid. Tiny popliteal cyst is noted. Subtle focal marrow edema is seen within the posterior tibial plateau. The bone marrow signal is heterogeneous likely reflecting marrow reconversion.     There is trace pes anserine bursitis. The tendons are otherwise normal.     Grade 2 fatty streaking of the semimembranosus and vastus lateralis muscles are noted. There is mild prepatellar edema.     MRI Right Shoulder 7/21/2017 (in media):   There is a complete tear of the supraspinatus tendon with retraction to the glenohumeral joint. There is partial tearing of the anterior infraspinatus articular surface. Mild subscapularis tendinosis is noted. The teres minor is intact. A small amount of subacromial/subdeltoid fluid is noted. The proximal long head biceps tendon is poorly visualized proximally although intact distally.      Degenerative tearing of the anterosuperior through posterosuperior labrum is noted. There is moderate to severe superior glenoid cartilage thinning with subchondral degenerative changes. The glenohumeral ligaments appear grossly intact.     An os acromiale is seen with fluid and osteophytosis at its junction with the base of the acromion. There is moderate superior subluxation of the AC joint with mild osteophytosis.     Small effusion with subcoracoid extension is noted.     There is grade 2/3 fatty atrophy of the supraspinatus muscle, grade 2 of the infraspinatus and subscapularis. Heterogeneous signal is seen throughout the bone marrow likely reflecting marrow reconversion.       Assessment:     Encounter Diagnoses   Name Primary?    Chronic pain disorder Yes    Chronic pain of left knee     Primary osteoarthritis of left knee        Plan:     Diagnoses and all orders for this visit:    Chronic pain disorder    Chronic pain of left knee    Primary osteoarthritis of left knee       Her pain is consistent with the above.    We discussed the assessment and recommendations.  All available images were reviewed. We discussed the disease process, prognosis, treatment plan, and risks and benefits. The patient is aware of the risks and benefits of the medications being prescribed, common side effects, and proper usage. The following is the plan we agreed on:     1. Schedule for left genicular RFA. This is time sensitive. We discussed that she will established care with Dr. Clark, since Dr. Vidal is no longer with [facility].  2. Patient has tolerated opioids in the past and notes improved function and quality of life while taking opioids for pain for debilitating knee pain. She is a candidate for palliative care at this time.   3. Until we are able to meet in person to reassess, we will temporarily increase Percocet 5/325 mg to TID PRN, #90. Refill sent today with appropriate date.   4. The patient is here today  for a refill of current pain medications and they believe these provide effective pain control and improvements in quality of life.  The patient notes no serious side effects, and feels the benefits outweigh the risks.  The patient was reminded of the pain contract that they signed previously as well as the risks and benefits of the medication including possible death.  The updated [place] Board of Pharm[employer] prescription monitoring program was reviewed, and the patient has been found to be compliant with current treatment plan. Medication management provided by Dr. Clark.   5. Pain Contract signed 9/4/2019.   6. UDS from 10/4/2019 reviewed and consistent.    7. Increase Gabapentin to 600 mg TID.  8. Recommended patient followup with bariatric surgery clinic. Encouraged her to continue working on smoking cessation.   9. RTC 2 weeks after above procedure.     The above plan and management options were discussed at length with patient. Patient is in agreement with the above and verbalized understanding.     Savanna Hyatt NP  06/02/2020

## 2020-06-04 ENCOUNTER — TELEPHONE (OUTPATIENT)
Dept: PAIN MEDICINE | Facility: CLINIC | Age: 70
End: 2020-06-04

## 2020-06-04 DIAGNOSIS — Z01.812 PRE-PROCEDURE LAB EXAM: Primary | ICD-10-CM

## 2020-06-04 NOTE — TELEPHONE ENCOUNTER
----- Message from Eunice Coughlin sent at 6/4/2020 10:27 AM CDT -----  Contact: Pt    Name of Who is Calling: LU RENAE [[id number]]    What is the request in detail: Patient would like a call back regarding appointment for Knee pain Please contact to further discuss and advise    Can the clinic reply by [facility]: No    [facility] [phone]

## 2020-06-04 NOTE — TELEPHONE ENCOUNTER
Staff contacted the patient to patient regarding her concerns.    Patient wanted to verify her procedure date and follow up.    Staff verified that the Patient left Genicular RFA was for 6/19/20 and that her follow up is on 6/30/20 in the office.     Patient verbalized understanding and expressed thanks

## 2020-06-08 ENCOUNTER — TELEPHONE (OUTPATIENT)
Dept: PAIN MEDICINE | Facility: CLINIC | Age: 70
End: 2020-06-08

## 2020-06-08 NOTE — TELEPHONE ENCOUNTER
----- Message from Betty Arlen sent at 6/8/2020 11:28 AM CDT -----  Contact: LU RENAE [[id number]]  Type: Patient Call Back    Who called: LU RENAE [[id number]]    What is the request in detail: Patient is requesting a call back. She states that she would like an sooner appointment. She states that she can not stand the pain. She states that she is taking the pain medication but it is not helping.   Please advise.    Can the clinic reply by [facility]? No    Best call back number: [phone]    Additional Information: N/A

## 2020-06-08 NOTE — TELEPHONE ENCOUNTER
"Staff contacted the patient to further discuss her concerns.     Patient states," I am in a lot of pain and would like to know if there is a sooner procedure date than the 6/19/20 procedure? Also I am out of my pain medication and would like to get a refill."    Staff informed the patient that her request for a sooner appointment date will be presented to our Pain-Management procedure schedulers for review and that her request for a refill can be picked up tomorrow from her pharmacy.     Patient stated," I would like for the prescription to be increased to 120 tablets and for the directions to be changes to 4 times daily as needed for pain."    Staff informed the patient that her request will e presented to Dr. Clark for review.     Patient verbalized understanding and expressed thanks.       "

## 2020-06-09 NOTE — TELEPHONE ENCOUNTER
"Staff contacted the patient to inform her of Dr. Eduardo MD recommendation which was to not increase her pain -medication and to attempt to get patient in sooner for her procedure.     Patient was very upset by this recommendation and stated,"If I fall and have to go to the emergency room that will be on you all."    Staff informed the patient that her regular prescription is at her requested pharmacy patient mumbled and hung up.           "

## 2020-06-17 ENCOUNTER — HOSPITAL ENCOUNTER (OUTPATIENT)
Dept: PREADMISSION TESTING | Facility: OTHER | Age: 70
Discharge: HOME | End: 2020-06-17
Attending: ANESTHESIOLOGY
Payer: MEDICARE

## 2020-06-17 DIAGNOSIS — Z01.812 PRE-PROCEDURE LAB EXAM: ICD-10-CM

## 2020-06-17 PROCEDURE — U0003 INFECTIOUS AGENT DETECTION BY NUCLEIC ACID (DNA OR RNA); SEVERE ACUTE RESPIRATORY SYNDROME CORONAVIRUS 2 (SARS-COV-2) (CORONAVIRUS DISEASE [COVID-19]), AMPLIFIED PROBE TECHNIQUE, MAKING USE OF HIGH THROUGHPUT TECHNOLOGIES AS DESCRIBED BY CMS-2020-01-R: HCPCS | Mod: HCNC

## 2020-06-18 LAB — SARS-COV-2 RNA RESP QL NAA+PROBE: NOT DETECTED

## 2020-06-19 ENCOUNTER — HOSPITAL ENCOUNTER (OUTPATIENT)
Facility: OTHER | Age: 70
Discharge: HOME | End: 2020-06-19
Attending: ANESTHESIOLOGY | Admitting: ANESTHESIOLOGY
Payer: MEDICARE

## 2020-06-19 VITALS
TEMPERATURE: 99 F | BODY MASS INDEX: 47.09 KG/M2 | DIASTOLIC BLOOD PRESSURE: 76 MMHG | WEIGHT: 293 LBS | SYSTOLIC BLOOD PRESSURE: 139 MMHG | HEART RATE: 76 BPM | OXYGEN SATURATION: 95 % | RESPIRATION RATE: 18 BRPM | HEIGHT: 66 IN

## 2020-06-19 DIAGNOSIS — M17.12 PRIMARY OSTEOARTHRITIS OF LEFT KNEE: Primary | ICD-10-CM

## 2020-06-19 DIAGNOSIS — G89.29 CHRONIC PAIN: ICD-10-CM

## 2020-06-19 PROCEDURE — 64624 DSTRJ NULYT AGT GNCLR NRV: CPT | Mod: HCNC,LT,, | Performed by: ANESTHESIOLOGY

## 2020-06-19 PROCEDURE — 64624 DSTRJ NULYT AGT GNCLR NRV: CPT

## 2020-06-19 PROCEDURE — 63600175 PHARM REV CODE 636 W HCPCS: Mod: HCNC | Performed by: ANESTHESIOLOGY

## 2020-06-19 PROCEDURE — 25000003 PHARM REV CODE 250: Mod: HCNC | Performed by: ANESTHESIOLOGY

## 2020-06-19 PROCEDURE — A4649 SURGICAL SUPPLIES: HCPCS | Mod: HCNC | Performed by: ANESTHESIOLOGY

## 2020-06-19 PROCEDURE — 64624 PR DESTRUCT, NEUROLYTIC AGENT, GENICULAR NERVE, W/IMG: ICD-10-PCS | Mod: HCNC,LT,, | Performed by: ANESTHESIOLOGY

## 2020-06-19 RX ORDER — FENTANYL CITRATE 50 UG/ML
INJECTION, SOLUTION INTRAMUSCULAR; INTRAVENOUS
Status: DISCONTINUED | OUTPATIENT
Start: 2020-06-19 | End: 2020-06-19 | Stop reason: HOSPADM

## 2020-06-19 RX ORDER — LIDOCAINE HYDROCHLORIDE 10 MG/ML
INJECTION INFILTRATION; PERINEURAL
Status: DISCONTINUED | OUTPATIENT
Start: 2020-06-19 | End: 2020-06-19 | Stop reason: HOSPADM

## 2020-06-19 RX ORDER — DEXAMETHASONE SODIUM PHOSPHATE 4 MG/ML
INJECTION, SOLUTION INTRA-ARTICULAR; INTRALESIONAL; INTRAMUSCULAR; INTRAVENOUS; SOFT TISSUE
Status: DISCONTINUED | OUTPATIENT
Start: 2020-06-19 | End: 2020-06-19 | Stop reason: HOSPADM

## 2020-06-19 RX ORDER — MIDAZOLAM HYDROCHLORIDE 1 MG/ML
INJECTION INTRAMUSCULAR; INTRAVENOUS
Status: DISCONTINUED | OUTPATIENT
Start: 2020-06-19 | End: 2020-06-19 | Stop reason: HOSPADM

## 2020-06-19 RX ORDER — BUPIVACAINE HYDROCHLORIDE 2.5 MG/ML
INJECTION, SOLUTION EPIDURAL; INFILTRATION; INTRACAUDAL
Status: DISCONTINUED | OUTPATIENT
Start: 2020-06-19 | End: 2020-06-19 | Stop reason: HOSPADM

## 2020-06-19 RX ORDER — SODIUM CHLORIDE 9 MG/ML
500 INJECTION, SOLUTION INTRAVENOUS CONTINUOUS
Status: DISCONTINUED | OUTPATIENT
Start: 2020-06-19 | End: 2020-06-19 | Stop reason: HOSPADM

## 2020-06-19 RX ADMIN — SODIUM CHLORIDE 500 ML: 0.9 INJECTION, SOLUTION INTRAVENOUS at 01:06

## 2020-06-19 NOTE — OP NOTE
Patient Name: Ca Coats  MRN: [id number]    INFORMED CONSENT: The procedure, risks, benefits and options were discussed with patient. There are no contraindications to the procedure. The patient expressed understanding and agreed to proceed. The personnel performing the procedure was discussed. I verify that I personally obtained Ca's consent prior to the start of the procedure and the signed consent can be found on the patient's chart.    Procedure Date: 06/19/2020    Anesthesia: Topical    Pre Procedure diagnosis: Chronic pain of left knee [M25.562, G89.29]  1. Primary osteoarthritis of left knee    2. Chronic pain      Post-Procedure diagnosis: SAME      Moderate Sedation: Yes - Fentanyl 50 mcg and Midazolam 2 mg  Sedation time: Less than 15 minutes      PROCEDURE: Left GENICULAR NERVE RADIOFREQUENCY ABLATION    DESCRIPTION OF PROCEDURE: The patient was brought to the fluoroscopy suite. IV access was obtained prior to the procedure. The patient was positioned supine on the fluoroscopy table. Continuous hemodynamic monitoring was initiated including blood pressure, EKG, and pulse oximetry. The area of the around the left knee joint was prepped with chlorhexidine  and draped into a sterile field. Skin anesthesia was achieved using Lidocaine 1% over the respective injection sites. A 17 gauge 75 (4mm active tip) tip RF needle  was slowly inserted and advanced to the junction of the lateral femoral and the epicondyle while contacting periosteum to block the superior lateral genicular nerve using the AP and lateral fluoroscopic imaging. Same process was repeated at the junction of the medial femoral shaft and the epicondyle to block the superior medial genicular nerve. The same process was repeated again using a 3rd needle which was advanced at the junction of the medial tibial plateau and the epicondyle to block the inferior medial genicular nerve. Using AP fluoroscopy the position of all 3 needles  was confirmed. Afterwards lateral fluoroscopic images were obtained and the needles were adjusted to lay in the middle of the diaphysis.Negative aspiration for blood. Motor stimulation at 2Hz up to 1.5V did not cause any radicular symptoms at any level. Each level was anesthetized with 1.5 cc of lidocaine 1%. Radiofrequency lesioning was performed for 150 seconds at 60 degrees . A combination of 3 cc of Bupivacaine 0.25% and 10 mg of Decadron was injected at all needle location to block all targeted nerves.. There was no pain on injection. The needle was removed and bleeding was nil. A sterile dressing was applied. No specimens collected. PATIENT was taken to the Post-block Recovery Area for further observation.       Blood Loss: Nill  Specimen: None    The procedure is considered time sensitive and medically-necessary given the patient's current clinical condition with moderate to severe pain, and/or significant functional impairment, and /or lack of alternative that present less risk of adverse event such as but not limited to a medication-related adverse event or care needed in the emergency department or hospitalization  due to inadequate pain relief.           Tevin Clark MD

## 2020-06-19 NOTE — PLAN OF CARE
PATIENT TOLERATED PROCEDURE WELL. PT COMPLAINS OF 5 /10 PAIN. ASSISTED PATIENT UP FOR FIRST TIME. STEADY ON FEET AND DISCHARGE INSTRUCTIONS GIVEN.

## 2020-06-19 NOTE — DISCHARGE SUMMARY
Discharge Note  Short Stay      SUMMARY     Admit Date: 6/19/2020    Attending Physician: Tevin Clark      Discharge Physician: Tevin Clark      Discharge Date: 6/19/2020 3:22 PM    Procedure(s) (LRB):  RADIOFREQUENCY ABLATION LEFT GENICULAR (Left)    Final Diagnosis: Chronic pain of left knee [M25.562, G89.29]    Disposition: Home or self care    Patient Instructions:   Current Discharge Medication List      CONTINUE these medications which have NOT CHANGED    Details   acetaminophen (TYLENOL) 500 MG tablet       albuterol (PROAIR HFA) 90 mcg/actuation inhaler Inhale 2 puffs into the lungs every 6 (six) hours as needed for Wheezing. Rescue  Qty: 18 g, Refills: 2    Associated Diagnoses: Wheezing      amLODIPine (NORVASC) 10 MG tablet Take 1 tablet (10 mg total) by mouth once daily.  Qty: 90 tablet, Refills: 1    Associated Diagnoses: Essential hypertension      atorvastatin (LIPITOR) 40 MG tablet Take 1 tablet (40 mg total) by mouth once daily.  Qty: 90 tablet, Refills: 3    Associated Diagnoses: Mixed hyperlipidemia      B-complex with vitamin C (Z-BEC OR EQUIV) tablet Take 1 tablet by mouth once daily.       CALCIUM CITRATE-VITAMIN D2 ORAL Take 1 tablet by mouth once daily.       ferrous gluconate 324 mg (37.5 mg iron) Tab tablet Take 1 tablet (324 mg total) by mouth daily with breakfast.  Qty: 90 tablet, Refills: 4    Associated Diagnoses: Normocytic anemia      gabapentin (NEURONTIN) 300 MG capsule Take 2 capsules (600 mg total) by mouth 3 (three) times daily.  Qty: 540 capsule, Refills: 0    Associated Diagnoses: Primary osteoarthritis of left knee; Chronic pain of left knee; Chronic pain disorder      lisinopriL (PRINIVIL,ZESTRIL) 40 MG tablet Take 1 tablet (40 mg total) by mouth once daily.  Qty: 90 tablet, Refills: 1    Associated Diagnoses: Essential hypertension      multivitamin with minerals tablet Take 1 tablet by mouth once daily.       omega 3-dha-epa-fish oil 1,000 mg (120 mg-180 mg) Cap        oxyCODONE-acetaminophen (PERCOCET) 5-325 mg per tablet Take 1 tablet by mouth 3 (three) times daily as needed for Pain.  Qty: 90 tablet, Refills: 0    Comments: Quantity prescribed more than 7 day supply? Yes, quantity medically necessary  Associated Diagnoses: Chronic pain disorder; Primary osteoarthritis of left knee; Chronic pain of left knee      pantoprazole (PROTONIX) 40 MG tablet Take 1 tablet (40 mg total) by mouth once daily.  Qty: 90 tablet, Refills: 1    Associated Diagnoses: Gastroesophageal reflux disease, esophagitis presence not specified                 Discharge Diagnosis: Chronic pain of left knee [M25.562, G89.29]  Condition on Discharge: Stable with no complications to procedure   Diet on Discharge: Same as before.  Activity: as per instruction sheet.  Discharge to: Home with a responsible adult.  Follow up: 2-4 weeks

## 2020-06-19 NOTE — DISCHARGE INSTRUCTIONS

## 2020-06-29 ENCOUNTER — PATIENT OUTREACH (OUTPATIENT)
Dept: ADMINISTRATIVE | Facility: OTHER | Age: 70
End: 2020-06-29

## 2020-06-29 ENCOUNTER — TELEPHONE (OUTPATIENT)
Dept: SMOKING CESSATION | Facility: CLINIC | Age: 70
End: 2020-06-29

## 2020-06-30 ENCOUNTER — TELEPHONE (OUTPATIENT)
Dept: PAIN MEDICINE | Facility: CLINIC | Age: 70
End: 2020-06-30

## 2020-06-30 NOTE — TELEPHONE ENCOUNTER
Staff contacted the patient to inform her that her visit today was an in office visit with Savanna Hyatt Np and not an audio visit.    Patient did not answer therefore staff left a detailed voice message informing the patient of the above information.

## 2020-06-30 NOTE — TELEPHONE ENCOUNTER
----- Message from Betty Mckinley sent at 6/30/2020 12:18 PM CDT -----  Contact: LU RENAE [[id number]]  Type: Patient Call Back    Who called: LU RENAE [[id number]]    What is the request in detail: Patient is requesting a call back. She states that she thought she had a phone appointment today. She would like to know if someone will be giving her a call. I advised her that this was an in office visit. She states that it was an audio visit.   Please advise.    Can the clinic reply by [facility]? No    Best call back number: [phone]    Additional Information: N/A

## 2020-07-02 ENCOUNTER — NURSE TRIAGE (OUTPATIENT)
Dept: ADMINISTRATIVE | Facility: CLINIC | Age: 70
End: 2020-07-02

## 2020-07-02 NOTE — TELEPHONE ENCOUNTER
Spoke with patient on behalf of [facility]'s post procedure system tracker  Patient denies covid 19 symptoms

## 2020-07-06 DIAGNOSIS — M17.12 PRIMARY OSTEOARTHRITIS OF LEFT KNEE: ICD-10-CM

## 2020-07-06 DIAGNOSIS — M25.562 CHRONIC PAIN OF LEFT KNEE: ICD-10-CM

## 2020-07-06 DIAGNOSIS — G89.4 CHRONIC PAIN DISORDER: Primary | ICD-10-CM

## 2020-07-06 DIAGNOSIS — G89.29 CHRONIC PAIN OF LEFT KNEE: ICD-10-CM

## 2020-07-06 RX ORDER — OXYCODONE AND ACETAMINOPHEN 5; 325 MG/1; MG/1
1 TABLET ORAL 3 TIMES DAILY PRN
Qty: 90 TABLET | Refills: 0 | Status: CANCELLED | OUTPATIENT
Start: 2020-07-08 | End: 2020-08-07

## 2020-07-06 NOTE — TELEPHONE ENCOUNTER
Patient completed audio visit with Savanna Hyatt 06/02/20. This is a former  patient that sees  now. Patient last received this medication 06/08/20. Patient completed UDS 10/04/19 and was consistent. Patient has a pain contract on file. Patient has a follow up scheduled 07/08/20.

## 2020-07-07 ENCOUNTER — OFFICE VISIT (OUTPATIENT)
Dept: BARIATRICS | Facility: CLINIC | Age: 70
End: 2020-07-07
Payer: MEDICARE

## 2020-07-07 VITALS
HEIGHT: 66 IN | DIASTOLIC BLOOD PRESSURE: 70 MMHG | BODY MASS INDEX: 47.09 KG/M2 | HEART RATE: 79 BPM | WEIGHT: 293 LBS | OXYGEN SATURATION: 94 % | SYSTOLIC BLOOD PRESSURE: 132 MMHG

## 2020-07-07 DIAGNOSIS — K21.9 GASTROESOPHAGEAL REFLUX DISEASE, ESOPHAGITIS PRESENCE NOT SPECIFIED: ICD-10-CM

## 2020-07-07 DIAGNOSIS — E66.01 CLASS 3 SEVERE OBESITY DUE TO EXCESS CALORIES WITH SERIOUS COMORBIDITY AND BODY MASS INDEX (BMI) OF 60.0 TO 69.9 IN ADULT: Primary | ICD-10-CM

## 2020-07-07 DIAGNOSIS — E78.2 MIXED HYPERLIPIDEMIA: ICD-10-CM

## 2020-07-07 DIAGNOSIS — I10 ESSENTIAL HYPERTENSION: ICD-10-CM

## 2020-07-07 DIAGNOSIS — G47.33 OBSTRUCTIVE SLEEP APNEA: ICD-10-CM

## 2020-07-07 PROCEDURE — 99204 OFFICE O/P NEW MOD 45 MIN: CPT | Mod: HCNC,S$GLB,, | Performed by: STUDENT IN AN ORGANIZED HEALTH CARE EDUCATION/TRAINING PROGRAM

## 2020-07-07 PROCEDURE — 3078F PR MOST RECENT DIASTOLIC BLOOD PRESSURE < 80 MM HG: ICD-10-PCS | Mod: HCNC,CPTII,S$GLB, | Performed by: STUDENT IN AN ORGANIZED HEALTH CARE EDUCATION/TRAINING PROGRAM

## 2020-07-07 PROCEDURE — 1101F PR PT FALLS ASSESS DOC 0-1 FALLS W/OUT INJ PAST YR: ICD-10-PCS | Mod: HCNC,CPTII,S$GLB, | Performed by: STUDENT IN AN ORGANIZED HEALTH CARE EDUCATION/TRAINING PROGRAM

## 2020-07-07 PROCEDURE — 3078F DIAST BP <80 MM HG: CPT | Mod: HCNC,CPTII,S$GLB, | Performed by: STUDENT IN AN ORGANIZED HEALTH CARE EDUCATION/TRAINING PROGRAM

## 2020-07-07 PROCEDURE — 1101F PT FALLS ASSESS-DOCD LE1/YR: CPT | Mod: HCNC,CPTII,S$GLB, | Performed by: STUDENT IN AN ORGANIZED HEALTH CARE EDUCATION/TRAINING PROGRAM

## 2020-07-07 PROCEDURE — 1159F PR MEDICATION LIST DOCUMENTED IN MEDICAL RECORD: ICD-10-PCS | Mod: HCNC,S$GLB,, | Performed by: STUDENT IN AN ORGANIZED HEALTH CARE EDUCATION/TRAINING PROGRAM

## 2020-07-07 PROCEDURE — 99999 PR PBB SHADOW E&M-EST. PATIENT-LVL V: CPT | Mod: PBBFAC,HCNC,, | Performed by: STUDENT IN AN ORGANIZED HEALTH CARE EDUCATION/TRAINING PROGRAM

## 2020-07-07 PROCEDURE — 99204 PR OFFICE/OUTPT VISIT, NEW, LEVL IV, 45-59 MIN: ICD-10-PCS | Mod: HCNC,S$GLB,, | Performed by: STUDENT IN AN ORGANIZED HEALTH CARE EDUCATION/TRAINING PROGRAM

## 2020-07-07 PROCEDURE — 3075F PR MOST RECENT SYSTOLIC BLOOD PRESS GE 130-139MM HG: ICD-10-PCS | Mod: HCNC,CPTII,S$GLB, | Performed by: STUDENT IN AN ORGANIZED HEALTH CARE EDUCATION/TRAINING PROGRAM

## 2020-07-07 PROCEDURE — 3008F BODY MASS INDEX DOCD: CPT | Mod: HCNC,CPTII,S$GLB, | Performed by: STUDENT IN AN ORGANIZED HEALTH CARE EDUCATION/TRAINING PROGRAM

## 2020-07-07 PROCEDURE — 1159F MED LIST DOCD IN RCRD: CPT | Mod: HCNC,S$GLB,, | Performed by: STUDENT IN AN ORGANIZED HEALTH CARE EDUCATION/TRAINING PROGRAM

## 2020-07-07 PROCEDURE — 3008F PR BODY MASS INDEX (BMI) DOCUMENTED: ICD-10-PCS | Mod: HCNC,CPTII,S$GLB, | Performed by: STUDENT IN AN ORGANIZED HEALTH CARE EDUCATION/TRAINING PROGRAM

## 2020-07-07 PROCEDURE — 1126F AMNT PAIN NOTED NONE PRSNT: CPT | Mod: HCNC,S$GLB,, | Performed by: STUDENT IN AN ORGANIZED HEALTH CARE EDUCATION/TRAINING PROGRAM

## 2020-07-07 PROCEDURE — 99999 PR PBB SHADOW E&M-EST. PATIENT-LVL V: ICD-10-PCS | Mod: PBBFAC,HCNC,, | Performed by: STUDENT IN AN ORGANIZED HEALTH CARE EDUCATION/TRAINING PROGRAM

## 2020-07-07 PROCEDURE — 1126F PR PAIN SEVERITY QUANTIFIED, NO PAIN PRESENT: ICD-10-PCS | Mod: HCNC,S$GLB,, | Performed by: STUDENT IN AN ORGANIZED HEALTH CARE EDUCATION/TRAINING PROGRAM

## 2020-07-07 PROCEDURE — 3075F SYST BP GE 130 - 139MM HG: CPT | Mod: HCNC,CPTII,S$GLB, | Performed by: STUDENT IN AN ORGANIZED HEALTH CARE EDUCATION/TRAINING PROGRAM

## 2020-07-07 RX ORDER — METFORMIN HYDROCHLORIDE 500 MG/1
500 TABLET ORAL 2 TIMES DAILY WITH MEALS
Qty: 60 TABLET | Refills: 0 | Status: SHIPPED | OUTPATIENT
Start: 2020-07-07 | End: 2020-08-05 | Stop reason: ALTCHOICE

## 2020-07-07 NOTE — PROGRESS NOTES
Subjective:       Patient ID: Ca Coats is a 69 y.o. female.    Chief Complaint: weight gain    Patient presents for treatment of obesity. Increased weight since quit smoking in January 2020.  Patient previously worked up for surgery. Required to quit smoking and nicotine levels monitored. Levels were down in March, but elective surgeries postponed due to Covid.     Co-morbidities associated with obesity:   HTN on Norvasc and lisinopril  HLD on Lipitor and fish oil  GERD on Protonix  LAURA on CPAP  Osteoarthritis, DJD    Weight History  Lowest adult weight: 160 lbs  Highest adult weight: 394 lbs     History of Weight Loss Efforts  Many diets, over the counter medications    Current Physical Activity  Needs knee replacement, in wheel chair    Current Eating Habits  Breakfast - cereal in early afternoon around 1-2 with banana or raisins  Lunch  Dinner - salad with cucumber, lettuce, eggs, tuna  Snacks -   Beverages - coffee and water    Eating out: no    Alcohol: no    Tobacco: quit in January 2020    Sleep: LAURA with CPAP      Review of Systems   Constitutional: Negative for chills and fever.   HENT: Negative for sore throat and trouble swallowing.    Eyes: Negative for visual disturbance.        Last eye exam 10/2018;    Respiratory: Positive for shortness of breath.    Cardiovascular: Negative for chest pain and palpitations.   Gastrointestinal: Positive for reflux. Negative for abdominal pain, constipation, diarrhea, nausea and vomiting.   Endocrine: Negative for cold intolerance and heat intolerance.   Genitourinary: Negative for difficulty urinating.        Negative for kidney stones   Musculoskeletal: Positive for arthralgias and gait problem. Negative for back pain.   Integumentary:  Negative for rash.   Allergic/Immunologic: Negative for immunocompromised state.   Neurological: Negative for dizziness, seizures, syncope and light-headedness.   Psychiatric/Behavioral: Positive for confusion.  Negative for agitation. The patient is not nervous/anxious.          Objective:   Optometry (10/2018):  Open angle with borderline findings for glaucoma bilaterally    Patient unable to use body composition scale.  Weight: 394 lbs  BMI: 63.59    Vitals:    07/07/20 1134   BP: 132/70   Pulse: 79       Physical Exam  Constitutional:       General: She is not in acute distress.     Appearance: Normal appearance. She is obese. She is not ill-appearing, toxic-appearing or diaphoretic.   HENT:      Head: Normocephalic and atraumatic.   Eyes:      Extraocular Movements: Extraocular movements intact.   Neck:      Musculoskeletal: Normal range of motion.   Cardiovascular:      Rate and Rhythm: Normal rate.   Pulmonary:      Effort: Pulmonary effort is normal. No respiratory distress.   Abdominal:      Palpations: Abdomen is soft.      Tenderness: There is no abdominal tenderness.   Musculoskeletal:         General: No deformity.   Skin:     General: Skin is warm and dry.   Neurological:      Mental Status: She is alert and oriented to person, place, and time.   Psychiatric:         Mood and Affect: Mood normal.         Behavior: Behavior normal.         Assessment:       1. Class 3 severe obesity due to excess calories with serious comorbidity and body mass index (BMI) of 60.0 to 69.9 in adult    2. Essential hypertension    3. Mixed hyperlipidemia    4. Gastroesophageal reflux disease, esophagitis presence not specified    5. Obstructive sleep apnea        Plan:   - Start Metformin 500mg twice daily with meals.  - Follow up labs A1c, lipids, Vit D, CMP  - Log all food and beverage intake with a daily calorie goal of 1600 caloreis; given [place] Healthy Eating Plate  - Referral to Optometry for possible glaucoma  - Return to clinic in 4 weeks

## 2020-07-07 NOTE — PATIENT INSTRUCTIONS
Start metformin 500mg twice daily with breakfast and dinner.  Always take with food.  No Alcohol.     Referral to Optometry for glaucoma.          Copyright © 2011, [facility]. For more information about The Healthy Eating Plate, please see [employer], Department of Nutrition, [employer], www.thenutritionsource.org, and [employer], www.health.[employer].    Food and Beverage Log:  Keep a log of all food and beverages that you consume.  Keeping track of calories will help you lose weight, because monitoring will help you become more aware of what you are eating and recognize your eating patterns.  Be mindful of your hunger level before eating and your satiety level after eating.  Just keeping records will help you make healthy changes in your diet and eat fewer calories.    Tips for keeping a calorie count:     ? Each day, aim for the daily calorie goal of 2602-0388 calories.     ? Record your food intake immediately after eating. If possible, look up calories before or immediately after eating.     ? Download an eulogio like [employer] or Lose It! To keep track of your calories.    ? Measuring foods (using measuring cups or spoons) will help you be more accurate with counting calories.     ? Keep a running calorie count throughout the day.     ? Count daily calories toward a weekly calorie total. If you eat too many calories one day, cut back slightly over the next few days to meet your weekly goal.

## 2020-07-08 ENCOUNTER — OFFICE VISIT (OUTPATIENT)
Dept: SPINE | Facility: CLINIC | Age: 70
End: 2020-07-08
Payer: MEDICARE

## 2020-07-08 VITALS
WEIGHT: 293 LBS | SYSTOLIC BLOOD PRESSURE: 123 MMHG | HEIGHT: 66 IN | RESPIRATION RATE: 18 BRPM | DIASTOLIC BLOOD PRESSURE: 75 MMHG | HEART RATE: 72 BPM | TEMPERATURE: 99 F | BODY MASS INDEX: 47.09 KG/M2

## 2020-07-08 DIAGNOSIS — M25.562 CHRONIC PAIN OF LEFT KNEE: ICD-10-CM

## 2020-07-08 DIAGNOSIS — G89.29 CHRONIC PAIN OF LEFT KNEE: ICD-10-CM

## 2020-07-08 DIAGNOSIS — G89.4 CHRONIC PAIN DISORDER: ICD-10-CM

## 2020-07-08 DIAGNOSIS — M17.12 PRIMARY OSTEOARTHRITIS OF LEFT KNEE: ICD-10-CM

## 2020-07-08 PROCEDURE — 99499 UNLISTED E&M SERVICE: CPT | Mod: S$PBB,HCNC,, | Performed by: ANESTHESIOLOGY

## 2020-07-08 PROCEDURE — 1101F PR PT FALLS ASSESS DOC 0-1 FALLS W/OUT INJ PAST YR: ICD-10-PCS | Mod: HCNC,CPTII,S$GLB, | Performed by: ANESTHESIOLOGY

## 2020-07-08 PROCEDURE — 99999 PR PBB SHADOW E&M-EST. PATIENT-LVL IV: CPT | Mod: PBBFAC,HCNC,, | Performed by: ANESTHESIOLOGY

## 2020-07-08 PROCEDURE — 99999 PR PBB SHADOW E&M-EST. PATIENT-LVL IV: ICD-10-PCS | Mod: PBBFAC,HCNC,, | Performed by: ANESTHESIOLOGY

## 2020-07-08 PROCEDURE — 1159F PR MEDICATION LIST DOCUMENTED IN MEDICAL RECORD: ICD-10-PCS | Mod: HCNC,S$GLB,, | Performed by: ANESTHESIOLOGY

## 2020-07-08 PROCEDURE — 3078F PR MOST RECENT DIASTOLIC BLOOD PRESSURE < 80 MM HG: ICD-10-PCS | Mod: HCNC,CPTII,S$GLB, | Performed by: ANESTHESIOLOGY

## 2020-07-08 PROCEDURE — 3074F SYST BP LT 130 MM HG: CPT | Mod: HCNC,CPTII,S$GLB, | Performed by: ANESTHESIOLOGY

## 2020-07-08 PROCEDURE — 1125F AMNT PAIN NOTED PAIN PRSNT: CPT | Mod: HCNC,S$GLB,, | Performed by: ANESTHESIOLOGY

## 2020-07-08 PROCEDURE — 99499 RISK ADDL DX/OHS AUDIT: ICD-10-PCS | Mod: S$PBB,HCNC,, | Performed by: ANESTHESIOLOGY

## 2020-07-08 PROCEDURE — 1101F PT FALLS ASSESS-DOCD LE1/YR: CPT | Mod: HCNC,CPTII,S$GLB, | Performed by: ANESTHESIOLOGY

## 2020-07-08 PROCEDURE — 1159F MED LIST DOCD IN RCRD: CPT | Mod: HCNC,S$GLB,, | Performed by: ANESTHESIOLOGY

## 2020-07-08 PROCEDURE — 3008F BODY MASS INDEX DOCD: CPT | Mod: HCNC,CPTII,S$GLB, | Performed by: ANESTHESIOLOGY

## 2020-07-08 PROCEDURE — 3078F DIAST BP <80 MM HG: CPT | Mod: HCNC,CPTII,S$GLB, | Performed by: ANESTHESIOLOGY

## 2020-07-08 PROCEDURE — 99214 OFFICE O/P EST MOD 30 MIN: CPT | Mod: HCNC,S$GLB,, | Performed by: ANESTHESIOLOGY

## 2020-07-08 PROCEDURE — 99214 PR OFFICE/OUTPT VISIT, EST, LEVL IV, 30-39 MIN: ICD-10-PCS | Mod: HCNC,S$GLB,, | Performed by: ANESTHESIOLOGY

## 2020-07-08 PROCEDURE — 3008F PR BODY MASS INDEX (BMI) DOCUMENTED: ICD-10-PCS | Mod: HCNC,CPTII,S$GLB, | Performed by: ANESTHESIOLOGY

## 2020-07-08 PROCEDURE — 1125F PR PAIN SEVERITY QUANTIFIED, PAIN PRESENT: ICD-10-PCS | Mod: HCNC,S$GLB,, | Performed by: ANESTHESIOLOGY

## 2020-07-08 PROCEDURE — 3074F PR MOST RECENT SYSTOLIC BLOOD PRESSURE < 130 MM HG: ICD-10-PCS | Mod: HCNC,CPTII,S$GLB, | Performed by: ANESTHESIOLOGY

## 2020-07-08 RX ORDER — OXYCODONE AND ACETAMINOPHEN 5; 325 MG/1; MG/1
1 TABLET ORAL 3 TIMES DAILY PRN
Qty: 90 TABLET | Refills: 0 | Status: SHIPPED | OUTPATIENT
Start: 2020-07-08 | End: 2020-08-06 | Stop reason: SDUPTHER

## 2020-07-08 NOTE — PROGRESS NOTES
"Chronic patient Established Note (Follow up visit)    HPI:     Ca Coats is a 69 y.o. female who presents today with left knee pain. Her pain has been going on for "too long."  She was previously treated by Dr. Iyer at [facility].  She has injections [id number] with him.  These were helpful, but she does not know if the injections were steroid or viscosupplementation.   This pain is described in detail below.     Interval History (4/30/2018):  The patient returns to clinic today for follow up and records review. We did received records from [facility] including a MRI of her knee. Dr. Iyer's last office visit note from January 2017 does not include any previous injections. She continues to report bilateral knee pain, left greater than right. She describes this pain as constant and aching. This pain is worse with prolonged walking. She also report right shoulder pain with prolonged overhead activity. She continues to take Percocet with benefit. She denies any other health changes.     Interval History (5/31/2018):  The patient returns to clinic for follow up. She is s/p left knee Synvisc One injection on 5/8/2018. She reports 35% relief of her knee pain. She continues to report bilateral knee pain, left greater than right. She describes this pain as constant and aching. Her pain is worse with prolonged walking and standing. She is scheduled to see Orthopedics at the  End of this month. She is also following up with Bariatrics to discuss the lap band procedure. She continues to take Percocet with benefit. She also uses Voltaren gel with benefit but this is expensive for her. She denies any other health changes.          Interval History (7/2/2018):  The patient returns to clinic today for follow up. She continues to report left knee pain that is constant, sharp, and aching in nature. Her pain is worse with prolonged walking and standing. She is scheduled for weight loss surgery in August. She continues " "follow up with orthopedics who does not recommend surgery at this time due to her BMI. She continues to take Percocet as needed with benefit. She denies any other health changes.      Interval History (7/19/2018):  The patient returns to clinic today for follow up. She is s/p left genicular nerve block on 7/10/2018. She reports 80% relief of her left knee pain. She reports that she was able to walk for longer distances (3 blocks) after the block. Her pain has returned to baseline. She continues to report left knee pain that is constant, sharp, and aching in nature. Her pain is worse with prolonged walking and standing. She denies any other health changes.      Interval History (8/21/2018):  The patient returns to clinic today for follow up. She is s/p left genicular cooled RFA on 7/31/2018. She reports 60% relief of her left knee pain. She reports intermittent knee pain that is sore and aching in nature. She continues to perform a home exercise routine. She is actively trying to lose weight. She continues to follow up with Bariatric Surgery at [facility]. She is considering weight loss surgery. She continues to use compound cream with benefit. She denies any other health changes.      Interval History (11/21/2018):  The patient returns to clinic today for follow up. She reports increased left knee pain this past week. She reports 2 episodes where her knee has "locked" up on her while walking. She describes her knee pain as sore and aching. She is actively trying to lose weight and quit smoking. She continues to use the compound cream with benefit. She denies any other health changes.      Interval History (1/9/2019):  The patient returns to clinic today for follow up. She is s/p left knee steroid injection on 12/21/2018. She reports one day of relief. She continues to report left knee pain that is constant, sharp, and aching in nature. Her pain is worse with standing and walking. She reports that her knee "locks" up on " her while walking. She often feels as though she is going to fall. She is scheduled for bariatric weight loss surgery in February at [facility]. She denies any other health changes.      Interval History (2/28/2019):  The patient returns to clinic today for follow up. She is s/p Euflexxa series completed on 1/30/2019. She reports that she able to stand for longer periods of time since the procedure. She continues to report left knee pain that is sore and aching in nature. Her pain is worse with prolonged standing and walking. She was previously scheduled for weight loss surgery but reports this was canceled. She is scheduled for follow up next week about this. She denies any other health changes.      Interval History (4/12/2019):  The patient returns for f/u of left knee pain.  She is s/p left genicular cooled RFA with about 90% pain relief.  She has been able to walk easier.  She also notices less stiffness in her knee.  She is planning on gastric surgery prior to knee replacement.  She had benefit with compounding cream but it is no longer covered by her insurance.  She does take Percocet from her PCP.  Her pain today is 7/10.     Interval History (7/11/19):  Patient reported to ED today for increased L knee pain and an episode of LLE weakness that lead to a near fall. Patient is s/p L genicular RFA in 3/19/19 that provided 90% relief for 2 months then came back. Pain is a crunchy pain, in left knee, that does not radiate, worse with movement, better with rest. Pain today is 9/10. She normally uses cane to ambulate but is currently using wheelchair at hospital. Denies any trauma ot the area, fever/chills, n/v, abdominal pain, or HA.     Interval History (8/8/2019):  She returns today for follow up s/p left knee genicular chemodenervation with phenol 7/19/19.  She reports that this procedure did not provide any improvement in her left knee symptoms, and she is still having significant difficulty with ambulation, still  uses wheelchair for mobility. She also reports left lateral thigh and hip pain lateral upper thigh numbness. She has been admitted to [facility] [place] but is not receiving PT there. She is still considering lap band surgery but has missed a followup appointment.      Interval History (9/4/2019):  The patient returns to clinic today for follow up. She continues to report intermittent left lateral thigh and hip pain that is tingling and numb in nature. She continues to report left knee pain. She continues to have difficulty ambulating due to pain. She is currently living in a SNF. She continues to take Gabapentin 300 mg BID. This was increased at last visit but not increased at the nursing home. She does report benefit with Percocet though it only last approximately 4-5 hours. She denies any adverse effects. She does present with a care attendant from the SNF today. Her pain today is 10/10.     Interval History (10/4/2019):  The patient returns to clinic today for follow up and medication refill. She continues to report bilateral knee pain that is aching in nature. She reports intermittent left lateral thigh pain that is tingling and shooting in nature. She reports that her pain has been improving since last visit. She has increased her activity. She continues to report benefit with Gabapentin. She continues to take Percocet as needed with benefit. She denies any other health changes. Her pain today is 0/10.     Interval History (1/6/2020):  The patient returns to clinic today for follow up. She reports increased left leg pain today that is tingling in nature. She continues to report bilateral knee pain, left greater than right. She describes this pain as constant, sharp, and aching. She is no longer living at the nursing facility. She has increased her home exercise routine. She is actively trying to quit smoking in order to move forward with bariatric surgery. She continues to report benefit with current  medication regimen. She takes Gabapentin and Percocet with benefit and without adverse effect. She denies any other health changes. Her pain today is 7/10.     Interval History (4/8/2020):  She returns today for follow up via audio.  She reports that the methocarbamol is not helping.  She continues to have a left-sided low back pain that is bothering her.  Her left knee continues to hurt.  Her right knee is hurting a little as well.  Percocet continues to help, but it is not lasting long enough.     Interval History (5/1/2020):  The patient returns for audio visit today for follow up. She continues to report bilateral knee pain, left greater than right. Her pain is worse with prolonged standing and walking. She reports that her back pain has improved. She continues to report benefit with current medication regimen. She continues to take Gabapentin and Percocet with benefit and without adverse effects. She denies any other health changes. Her pain today is 8/10.     Interval History (6/2/2020):  The patient returns to clinic today via audio visit for follow up of knee pain. She reports increased left knee pain in the last week. Her pain is worse with standing and walking. She feels as though she will fall when standing. She is currently using ice, heat, and OTC lidocaine patches with limited relief. She continues to take Gabapentin and Percocet with some benefit. She denies any adverse effects. She denies any other health changes. Her pain today is 10/10.     Physical Therapy: Yes, she did this in 2018 for one month (twice a week). She does HEP (stretching and ROM in the morning)    Interval History 7/8/2020:    Ca Coats presents to the clinic for a follow-up appointment for follow up after 6/19/20 RFA Since the last visit, Ca Coats states the pain has been persistant. Current pain intensity is 10/10.    Pain Disability Index Review:  Last 3 PDI Scores 7/8/2020 1/6/2020 10/4/2019    Pain Disability Index (PDI) 70 38 0       Pain Medications:    Current:  · Gabapentin, tylenol 500mg Q6 prn, Percocet 5/325 mg TID PRN    Opioid Contract: yes     report:  Reviewed and consistent with medication use as prescribed.    Pain Procedures:   · 5/8/2018- Left knee Synvisc One injection  · 7/10/2018- Left genicular nerve block  · 7/31/2018- Left genicular cooled RFA  · 12/21/2018- Left knee steroid injection  · 1/31/2019- Left knee Euflexxa series  · 3/19/2019 Left genicular cooled RFA- 90% relief  · 7/19/19- Left knee genicular phenol chemodenervation-     Physical Therapy/Home Exercise: yes, she did this in 2018 for one month (twice a week). She does HEP (stretching and ROM in the morning)    Imaging:   MRI Left Knee 7/21/2017 (in media):  The medial meniscus is intact. The lateral meniscus is intact.      A chronic complete ACL tear is noted. The posterior cruciate ligament is intact. The medial and lateral retinacula are intact. The medial collateral ligament is intact. The lateral collateral ligament in intact. The posterolateral corner structures are intact.      There is full thickness fissuring of the central aspect medial femoral cartilage. The lateral tibiofemoral compartment cartilage is intact. There is broad thickness defect within the central trochlear cartilage. There is mild lateral patellofemoral cartilage thinning and regional full thickness loss of the central superior patellar cartilage.      A small effusion is present. There is trace infrapatellar fluid. Tiny popliteal cyst is noted. Subtle focal marrow edema is seen within the posterior tibial plateau. The bone marrow signal is heterogeneous likely reflecting marrow reconversion.      There is trace pes anserine bursitis. The tendons are otherwise normal.      Grade 2 fatty streaking of the semimembranosus and vastus lateralis muscles are noted. There is mild prepatellar edema.      MRI Right Shoulder 7/21/2017 (in media):    There is a complete tear of the supraspinatus tendon with retraction to the glenohumeral joint. There is partial tearing of the anterior infraspinatus articular surface. Mild subscapularis tendinosis is noted. The teres minor is intact. A small amount of subacromial/subdeltoid fluid is noted. The proximal long head biceps tendon is poorly visualized proximally although intact distally.      Degenerative tearing of the anterosuperior through posterosuperior labrum is noted. There is moderate to severe superior glenoid cartilage thinning with subchondral degenerative changes. The glenohumeral ligaments appear grossly intact.      An os acromiale is seen with fluid and osteophytosis at its junction with the base of the acromion. There is moderate superior subluxation of the AC joint with mild osteophytosis.      Small effusion with subcoracoid extension is noted.      There is grade 2/3 fatty atrophy of the supraspinatus muscle, grade 2 of the infraspinatus and subscapularis. Heterogeneous signal is seen throughout the bone marrow likely reflecting marrow reconversion.        Allergies: Review of patient's allergies indicates:  No Known Allergies    Current Medications:   Current Outpatient Medications   Medication Sig Dispense Refill    acetaminophen (TYLENOL) 500 MG tablet       albuterol (PROAIR HFA) 90 mcg/actuation inhaler Inhale 2 puffs into the lungs every 6 (six) hours as needed for Wheezing. Rescue 18 g 2    amLODIPine (NORVASC) 10 MG tablet Take 1 tablet (10 mg total) by mouth once daily. 90 tablet 1    atorvastatin (LIPITOR) 40 MG tablet Take 1 tablet (40 mg total) by mouth once daily. 90 tablet 3    B-complex with vitamin C (Z-BEC OR EQUIV) tablet Take 1 tablet by mouth once daily.       CALCIUM CITRATE-VITAMIN D2 ORAL Take 1 tablet by mouth once daily.       ferrous gluconate 324 mg (37.5 mg iron) Tab tablet Take 1 tablet (324 mg total) by mouth daily with breakfast. 90 tablet 4    gabapentin  (NEURONTIN) 300 MG capsule Take 2 capsules (600 mg total) by mouth 3 (three) times daily. 540 capsule 0    lisinopriL (PRINIVIL,ZESTRIL) 40 MG tablet Take 1 tablet (40 mg total) by mouth once daily. 90 tablet 1    metFORMIN (GLUCOPHAGE) 500 MG tablet Take 1 tablet (500 mg total) by mouth 2 (two) times daily with meals. 60 tablet 0    multivitamin with minerals tablet Take 1 tablet by mouth once daily.       omega 3-dha-epa-fish oil 1,000 mg (120 mg-180 mg) Cap       oxyCODONE-acetaminophen (PERCOCET) 5-325 mg per tablet Take 1 tablet by mouth 3 (three) times daily as needed for Pain. 90 tablet 0    pantoprazole (PROTONIX) 40 MG tablet Take 1 tablet (40 mg total) by mouth once daily. 90 tablet 1     No current facility-administered medications for this visit.        REVIEW OF SYSTEMS:    GENERAL:  No weight loss, malaise or fevers.  HEENT:  Negative for frequent or significant headaches.  NECK:  Negative for lumps, goiter, pain and significant neck swelling.  RESPIRATORY:  Negative for cough, wheezing or shortness of breath. +LAURA  CARDIOVASCULAR:  Negative for chest pain, leg swelling or palpitations.  GI:  Negative for abdominal discomfort, blood in stools or black stools or change in bowel habits. +GERD  MUSCULOSKELETAL:  + Left knee pain  SKIN:  Negative for lesions, rash, and itching.  PSYCH:  Positive for  mood disorder and recent psychosocial stressors. +sleep disturbance  HEMATOLOGY/LYMPHOLOGY:  Negative for prolonged bleeding, bruising easily or swollen nodes.  NEURO:   No history of headaches, syncope, paralysis, seizures or tremors.  All other reviewed and negative other than HPI.    Past Medical History:  Past Medical History:   Diagnosis Date    Anemia     2018    Arthritis     BMI 60.0-69.9, adult     Cataract     Depression     Dry eye syndrome     GERD (gastroesophageal reflux disease)     Glaucoma suspect     History of gastric ulcer     Hyperlipidemia     Hypertension      Hypothyroidism     Morbid obesity     Neuromuscular disorder     Thyroid disease        Past Surgical History:  Past Surgical History:   Procedure Laterality Date    APPENDECTOMY      CATARACT EXTRACTION W/  INTRAOCULAR LENS IMPLANT Bilateral     MFIOL OU    INJECTION OF ANESTHETIC AGENT AROUND NERVE Left 7/10/2018    Procedure: BLOCK, NERVE;  Surgeon: Samantha Vidal MD;  Location: [facility];  Service: Pain Management;  Laterality: Left;  Genicular     INJECTION OF ANESTHETIC AGENT AROUND NERVE Left 2019    Procedure: Block, Nerve NERVE BLOCK GENICULAR WITH PHENOL 6%;  Surgeon: Samantha Vidal MD;  Location: [facility];  Service: Pain Management;  Laterality: Left;  NEEDS CONSENT    RADIOFREQUENCY ABLATION Left 2020    Procedure: RADIOFREQUENCY ABLATION LEFT GENICULAR;  Surgeon: Tevin Clark MD;  Location: [facility];  Service: Pain Management;  Laterality: Left;  Left Genicular RFA       Family History:  Family History   Problem Relation Age of Onset    No Known Problems Mother     No Known Problems Father        Social History:  Social History     Socioeconomic History    Marital status: Single     Spouse name: Not on file    Number of children: 2    Years of education: Not on file    Highest education level: Not on file   Occupational History     Comment: retired  and [employer]   Social Needs    Financial resource strain: Not on file    Food insecurity     Worry: Not on file     Inability: Not on file    Transportation needs     Medical: Not on file     Non-medical: Not on file   Tobacco Use    Smoking status: Former Smoker     Packs/day: 0.25     Years: 50.00     Pack years: 12.50     Types: Cigarettes     Quit date: 2020     Years since quittin.5    Smokeless tobacco: Never Used   Substance and Sexual Activity    Alcohol use: Not Currently    Drug use: No    Sexual activity: Not Currently     Partners: Male   Lifestyle    Physical activity     Days  "per week: Not on file     Minutes per session: Not on file    Stress: Not on file   Relationships    Social connections     Talks on phone: Not on file     Gets together: Not on file     Attends Buddhist service: Not on file     Active member of club or organization: Not on file     Attends meetings of clubs or organizations: Not on file     Relationship status: Not on file   Other Topics Concern    Not on file   Social History Narrative    Not on file       OBJECTIVE:    /75   Pulse 72   Temp 98.6 °F (37 °C) (Oral)   Resp 18   Ht 5' 6" (1.676 m)   Wt (!) 178.7 kg (394 lb)   BMI 63.59 kg/m²     PHYSICAL EXAMINATION:    General appearance: Well appearing, in no acute distress, alert and oriented x3.  Psych:  Mood and affect appropriate.  Skin: Skin color, texture, turgor normal, no rashes or lesions, in both upper and lower body.  Head/face:  Atraumatic, normocephalic. No palpable lymph nodes  Neck: No pain to palpation over the cervical paraspinous muscles. Spurling Negative. No pain with neck flexion, extension, or lateral flexion. .  Cor: RRR  Pulm: CTA  GI: Abdomen soft and non-tender.  Back: Straight leg raising in the sitting and supine positions is negative to radicular pain. No pain to palpation over the spine or costovertebral angles. Normal range of motion without pain reproduction.  Extremities: Peripheral joint ROM is full and pain free without obvious instability or laxity in all four extremities, except for limited ROM of the left knee,. No deformities, edema, or skin discoloration. Good capillary refill.  Musculoskeletal: Tenderness to palpation over the left knee. Shoulder, hip, sacroiliac  provocative maneuvers are negative. Bilateral upper and lower extremity strength is normal and symmetric.  No atrophy or tone abnormalities are noted.  Neuro: Bilateral upper and lower extremity coordination and muscle stretch reflexes are physiologic and symmetric.  Plantar response are downgoing. " No loss of sensation is noted.  Gait: Normal.    ASSESSMENT: 69 y.o. year old female with chronic left knee consistent left knee osteoarthritis.  She is here today for follow-up status post knee genicular nerve cooled radiofrequency ablation with minimal improvement in pain.  She is awaiting medical optimization and weight loss for left knee arthroplasty.  We will schedule for left knee Euflexxa intra-articular injection.  Will up with her 2 weeks after the procedure.  Medication refill for Percocet 5/325 mg t.i.d. as needed is provided today.     1. Chronic pain disorder  oxyCODONE-acetaminophen (PERCOCET) 5-325 mg per tablet   2. Primary osteoarthritis of left knee  oxyCODONE-acetaminophen (PERCOCET) 5-325 mg per tablet   3. Chronic pain of left knee  oxyCODONE-acetaminophen (PERCOCET) 5-325 mg per tablet         PLAN:     - I have stressed the importance of physical activity and a home exercise plan to help with pain and improve health.  - Refill Percocet 5/325 mg t.i.d. as needed.  - Schedule for left knee intra-articular Euflexxa injection.   - RTC in 2 weeks after the procedure.  - Counseled patient regarding the importance of activity modification.    The above plan and management options were discussed at length with patient. Patient is in agreement with the above and verbalized understanding.    Tevin Clark  07/08/2020

## 2020-07-09 PROBLEM — E66.813 CLASS 3 SEVERE OBESITY DUE TO EXCESS CALORIES WITH SERIOUS COMORBIDITY AND BODY MASS INDEX (BMI) OF 60.0 TO 69.9 IN ADULT: Status: ACTIVE | Noted: 2019-04-29

## 2020-07-14 ENCOUNTER — TELEPHONE (OUTPATIENT)
Dept: PAIN MEDICINE | Facility: CLINIC | Age: 70
End: 2020-07-14

## 2020-07-14 ENCOUNTER — TELEPHONE (OUTPATIENT)
Dept: BARIATRICS | Facility: CLINIC | Age: 70
End: 2020-07-14

## 2020-07-14 DIAGNOSIS — M17.12 PRIMARY OSTEOARTHRITIS OF LEFT KNEE: Primary | ICD-10-CM

## 2020-07-14 DIAGNOSIS — G89.29 CHRONIC PAIN OF LEFT KNEE: ICD-10-CM

## 2020-07-14 DIAGNOSIS — M25.562 CHRONIC PAIN OF LEFT KNEE: ICD-10-CM

## 2020-07-14 NOTE — TELEPHONE ENCOUNTER
----- Message from Catherine Underwood sent at 7/14/2020 11:33 AM CDT -----  Regarding: Eye Doctor Referral  Contact: Patient Called [phone]  Inquiring about Referral for Eye Doctor.  Please contact patient at [phone]

## 2020-07-14 NOTE — TELEPHONE ENCOUNTER
----- Message from Tevin Clark MD sent at 7/13/2020  6:41 PM CDT -----  Regarding: RE: knee injections  It's office peocedure.  ----- Message -----  From: Marta Cunningham MA  Sent: 7/13/2020   4:26 PM CDT  To: Tevin Clark MD  Subject: knee injections                                  The following patient was scheduled to have Euflexxa injections. Is this to be done in the office or procedure area?       Marta

## 2020-07-14 NOTE — TELEPHONE ENCOUNTER
Spoke with pt in regards to optometry referral , informed pt of optometry scheduling department number at [phone]. Pt verbalized understanding.

## 2020-07-20 ENCOUNTER — TELEPHONE (OUTPATIENT)
Dept: BARIATRICS | Facility: CLINIC | Age: 70
End: 2020-07-20

## 2020-07-20 NOTE — TELEPHONE ENCOUNTER
----- Message from Amparo Hardy sent at 7/20/2020 11:37 AM CDT -----  Pt is calling stating that Dr. Hopkins told her she can walk in and schedule lab work. Pt would like for the nurse to give her a call back

## 2020-07-20 NOTE — TELEPHONE ENCOUNTER
----- Message from Maria Isabel Santana sent at 7/20/2020  2:09 PM CDT -----  Regarding: Pt  Reason: Pt returning Manfred call.     Communication: [phone]

## 2020-07-27 ENCOUNTER — PATIENT OUTREACH (OUTPATIENT)
Dept: ADMINISTRATIVE | Facility: OTHER | Age: 70
End: 2020-07-27

## 2020-07-27 NOTE — PROGRESS NOTES
Requested updates within Care Everywhere.  Patient's chart was reviewed for overdue DONN topics.  Immunizations reconciled.    Orders placed:n/a  Tasked appts:n/a  Labs Linked:n/a

## 2020-07-28 ENCOUNTER — PROCEDURE VISIT (OUTPATIENT)
Dept: PAIN MEDICINE | Facility: CLINIC | Age: 70
End: 2020-07-28
Payer: MEDICARE

## 2020-07-28 ENCOUNTER — CLINICAL SUPPORT (OUTPATIENT)
Dept: OPHTHALMOLOGY | Facility: CLINIC | Age: 70
End: 2020-07-28
Payer: MEDICARE

## 2020-07-28 ENCOUNTER — OFFICE VISIT (OUTPATIENT)
Dept: OPTOMETRY | Facility: CLINIC | Age: 70
End: 2020-07-28
Payer: COMMERCIAL

## 2020-07-28 ENCOUNTER — LAB VISIT (OUTPATIENT)
Dept: LAB | Facility: HOSPITAL | Age: 70
End: 2020-07-28
Attending: STUDENT IN AN ORGANIZED HEALTH CARE EDUCATION/TRAINING PROGRAM
Payer: MEDICARE

## 2020-07-28 VITALS
HEIGHT: 66 IN | WEIGHT: 293 LBS | TEMPERATURE: 98 F | OXYGEN SATURATION: 100 % | BODY MASS INDEX: 47.09 KG/M2 | RESPIRATION RATE: 18 BRPM

## 2020-07-28 DIAGNOSIS — H40.013 OPEN ANGLE WITH BORDERLINE FINDINGS OF BOTH EYES: Primary | ICD-10-CM

## 2020-07-28 DIAGNOSIS — Z96.1 PSEUDOPHAKIA OF BOTH EYES: ICD-10-CM

## 2020-07-28 DIAGNOSIS — I10 ESSENTIAL HYPERTENSION: ICD-10-CM

## 2020-07-28 DIAGNOSIS — E66.01 CLASS 3 SEVERE OBESITY DUE TO EXCESS CALORIES WITH SERIOUS COMORBIDITY AND BODY MASS INDEX (BMI) OF 60.0 TO 69.9 IN ADULT: ICD-10-CM

## 2020-07-28 DIAGNOSIS — M25.562 CHRONIC PAIN OF LEFT KNEE: ICD-10-CM

## 2020-07-28 DIAGNOSIS — H26.491 PCO (POSTERIOR CAPSULAR OPACIFICATION), RIGHT: ICD-10-CM

## 2020-07-28 DIAGNOSIS — M17.12 PRIMARY OSTEOARTHRITIS OF LEFT KNEE: Primary | ICD-10-CM

## 2020-07-28 DIAGNOSIS — G89.29 CHRONIC PAIN OF LEFT KNEE: ICD-10-CM

## 2020-07-28 LAB
25(OH)D3+25(OH)D2 SERPL-MCNC: 54 NG/ML (ref 30–96)
ALBUMIN SERPL BCP-MCNC: 3.6 G/DL (ref 3.5–5.2)
ALP SERPL-CCNC: 96 U/L (ref 55–135)
ALT SERPL W/O P-5'-P-CCNC: 11 U/L (ref 10–44)
ANION GAP SERPL CALC-SCNC: 8 MMOL/L (ref 8–16)
AST SERPL-CCNC: 12 U/L (ref 10–40)
BILIRUB SERPL-MCNC: 0.5 MG/DL (ref 0.1–1)
BUN SERPL-MCNC: 14 MG/DL (ref 8–23)
CALCIUM SERPL-MCNC: 10 MG/DL (ref 8.7–10.5)
CHLORIDE SERPL-SCNC: 107 MMOL/L (ref 95–110)
CHOLEST SERPL-MCNC: 101 MG/DL (ref 120–199)
CHOLEST/HDLC SERPL: 2.1 {RATIO} (ref 2–5)
CO2 SERPL-SCNC: 27 MMOL/L (ref 23–29)
CREAT SERPL-MCNC: 0.8 MG/DL (ref 0.5–1.4)
EST. GFR  (AFRICAN AMERICAN): >60 ML/MIN/1.73 M^2
EST. GFR  (NON AFRICAN AMERICAN): >60 ML/MIN/1.73 M^2
ESTIMATED AVG GLUCOSE: 88 MG/DL (ref 68–131)
GLUCOSE SERPL-MCNC: 96 MG/DL (ref 70–110)
HBA1C MFR BLD HPLC: 4.7 % (ref 4–5.6)
HDLC SERPL-MCNC: 49 MG/DL (ref 40–75)
HDLC SERPL: 48.5 % (ref 20–50)
LDLC SERPL CALC-MCNC: 29.6 MG/DL (ref 63–159)
NONHDLC SERPL-MCNC: 52 MG/DL
POTASSIUM SERPL-SCNC: 4.5 MMOL/L (ref 3.5–5.1)
PROT SERPL-MCNC: 7.2 G/DL (ref 6–8.4)
SODIUM SERPL-SCNC: 142 MMOL/L (ref 136–145)
TRIGL SERPL-MCNC: 112 MG/DL (ref 30–150)

## 2020-07-28 PROCEDURE — 83036 HEMOGLOBIN GLYCOSYLATED A1C: CPT | Mod: HCNC

## 2020-07-28 PROCEDURE — 20611 DRAIN/INJ JOINT/BURSA W/US: CPT | Mod: HCNC,LT,S$GLB, | Performed by: ANESTHESIOLOGY

## 2020-07-28 PROCEDURE — 92014 PR EYE EXAM, EST PATIENT,COMPREHESV: ICD-10-PCS | Mod: S$GLB,,, | Performed by: OPTOMETRIST

## 2020-07-28 PROCEDURE — 99999 PR PBB SHADOW E&M-EST. PATIENT-LVL III: ICD-10-PCS | Mod: PBBFAC,,, | Performed by: OPTOMETRIST

## 2020-07-28 PROCEDURE — 99999 PR PBB SHADOW E&M-EST. PATIENT-LVL III: CPT | Mod: PBBFAC,,, | Performed by: OPTOMETRIST

## 2020-07-28 PROCEDURE — 92133 POSTERIOR SEGMENT OCT OPTIC NERVE(OCULAR COHERENCE TOMOGRAPHY) - OU - BOTH EYES: ICD-10-PCS | Mod: S$GLB,,, | Performed by: OPTOMETRIST

## 2020-07-28 PROCEDURE — 80061 LIPID PANEL: CPT | Mod: HCNC

## 2020-07-28 PROCEDURE — 92083 HUMPHREY VISUAL FIELD - OU - BOTH EYES: ICD-10-PCS | Mod: S$GLB,,, | Performed by: OPTOMETRIST

## 2020-07-28 PROCEDURE — 82306 VITAMIN D 25 HYDROXY: CPT | Mod: HCNC

## 2020-07-28 PROCEDURE — 20611 PR DRAIN/ASP/INJECT MAJOR JOINT/BURSA W/US GUIDANCE: ICD-10-PCS | Mod: HCNC,LT,S$GLB, | Performed by: ANESTHESIOLOGY

## 2020-07-28 PROCEDURE — 92133 CPTRZD OPH DX IMG PST SGM ON: CPT | Mod: S$GLB,,, | Performed by: OPTOMETRIST

## 2020-07-28 PROCEDURE — 92083 EXTENDED VISUAL FIELD XM: CPT | Mod: S$GLB,,, | Performed by: OPTOMETRIST

## 2020-07-28 PROCEDURE — 80053 COMPREHEN METABOLIC PANEL: CPT | Mod: HCNC

## 2020-07-28 PROCEDURE — 36415 COLL VENOUS BLD VENIPUNCTURE: CPT | Mod: HCNC

## 2020-07-28 PROCEDURE — 92014 COMPRE OPH EXAM EST PT 1/>: CPT | Mod: S$GLB,,, | Performed by: OPTOMETRIST

## 2020-07-28 NOTE — PROCEDURES
Patient Name: Ca Coats  MRN: [id number]    INFORMED CONSENT: The procedure, risks, benefits and options were discussed with patient. There are no contraindications to the procedure. The patient expressed understanding and agreed to proceed. The personnel performing the procedure was discussed. I verify that I personally obtained Ca's consent prior to the start of the procedure and the signed consent can be found on the patient's chart.    Procedure Date: 07/28/2020    Anesthesia: Topical    Pre Procedure diagnosis:   1. Primary osteoarthritis of left knee    2. Chronic pain of left knee      Post-Procedure diagnosis: same      Sedation: None    PROCEDURE: Left KNEE INTRAARTICULAR Orthovisc INJECTION under ultrasound guide    Patient in the supine position with right knee bending 45 degrees, the area of the left  knee was prepped with chlorhexidine .  After performing time out and palpating the superior-lateral pattellar aspect A high-frequency linear transducer ultrasound probe is placed in the superolateral corner of the patella, directed medially toward the patellofemoral joint space.  A 22 Gauge 3.5 inch needle was slowly advance Using an in-plane approach, and directed into the suprapatellar joint space towards the knee joint. After negative aspiration 2 cc of Orthovisc was injected in the Knee joint.      No complications. Patient tolerated procedure well.    Blood Loss: Nill  Specimen: None    The procedure is considered time sensitive and medically-necessary given the patient's current clinical condition with moderate to severe pain, and/or significant functional impairment, and /or lack of alternative that present less risk of adverse event such as but not limited to a medication-related adverse event or care needed in the emergency department or hospitalization  due to inadequate pain relief.       Tevin Clark MD

## 2020-07-28 NOTE — PROGRESS NOTES
Visual field test done   Pt stated no latex allergies used coverlet patch    mrx    -0.75+0.50x92  -1.25+0.50x92

## 2020-07-28 NOTE — PROGRESS NOTES
HPI     Last eye exam was 10/18/18 with   Pt here for routine eye exam.    Pt states that she is doing well.   Pt states that she does not wear glasses, and does not think she needs   any.   Pt states she is taking a pill that sometimes makes her eyes blurry.   Patient denies diplopia, headaches, flashes/floaters, and pain.  No eye drops.     Last edited by Arely Singh on 7/28/2020  9:01 AM. (History)            Assessment /Plan     For exam results, see Encounter Report.    Open angle with borderline findings of both eyes    1.  Due to increased c/d ratio R>L.  No signs of glaucoma today.       HVF: 10/18/18  Visual fields unreliable., stable today, overall depression and increased false neg  OCT: 10/18/18 WNL , stable today  Gonio: 10/18/18  Pachy: 492 OD, 492 OS  Initial IOPs: low/ WNL  MHx: HTN  FHx: None      Essential hypertension   No retinopathy, monitor yearly    Pseudophakia of both eyes  PCO (posterior capsular opacification), right   Consult for YAG    RTC 1 year, sooner PRN

## 2020-07-29 ENCOUNTER — TELEPHONE (OUTPATIENT)
Dept: INTERNAL MEDICINE | Facility: CLINIC | Age: 70
End: 2020-07-29

## 2020-07-29 NOTE — TELEPHONE ENCOUNTER
----- Message from Crys Gonzalez sent at 7/29/2020  2:07 PM CDT -----  Regarding: papework  Name of Who is Calling: LU RENAE [[id number]]      What is the request in detail: Patient is requesting a call back she states she brought a paper on yesterday for her pcp to fill out for her home health she states she needs it to filled out and returned asap because she has to turn it back in on 08/07/20      Can the clinic reply by [facility]: no      What Number to Call Back if not in [facility]: [phone]

## 2020-07-29 NOTE — TELEPHONE ENCOUNTER
Spoke to pt letting her know I will fax office notes from   07/08, 07/07, 06/02, 03/10  She is requesting more hrs for HH assistance and that is why she dropped off the paper (10+ or more due to doctor visits)  She said she got a knee injection and labs and the aid came at 6am to help her but does not get paid for further hrs and she needs more hrs for doctor visits and weekends    She states her aid does 18 hrs per week and no weekends, only m-Friday she comes 4 hrs m-wed and thurs-fri 3 hrs  She falls due to trembling and needs assistance in the weekend   And states she calls 911   States that she needs assistance to the shower     Has upcoming surgery  And states knee injections do not help

## 2020-07-31 DIAGNOSIS — I10 ESSENTIAL HYPERTENSION: ICD-10-CM

## 2020-07-31 NOTE — TELEPHONE ENCOUNTER
Called Pt and notified we sent her request for more assistance hrs to the state via fax with office notes

## 2020-08-03 RX ORDER — LISINOPRIL 40 MG/1
TABLET ORAL
Qty: 90 TABLET | Refills: 1 | Status: SHIPPED | OUTPATIENT
Start: 2020-08-03 | End: 2021-02-02

## 2020-08-03 RX ORDER — AMLODIPINE BESYLATE 10 MG/1
TABLET ORAL
Qty: 90 TABLET | Refills: 1 | Status: SHIPPED | OUTPATIENT
Start: 2020-08-03 | End: 2021-02-02

## 2020-08-03 NOTE — PROGRESS NOTES
Refill Routing Note   Medication(s) are not appropriate for processing by [facility]:       - Non-participating provider           Medication reconciliation completed: No      Automatic Epic Generated Protocol Data:        Requested Prescriptions   Pending Prescriptions Disp Refills    amLODIPine (NORVASC) 10 MG tablet [Pharmacy Med Name: AMLODIPINE BESYLATE 10 MG Tablet] 90 tablet 1     Sig: TAKE 1 TABLET EVERY DAY       Calcium-Channel Blockers Protocol Passed - 8/3/2020 11:30 AM        Passed - Patient is not currently pregnant        Passed - No positive pregnancy test in past 12 months         Passed - Visit with authorizing provider in past 12 months or upcoming 90 days         Passed - Blood Pressure below 139/89 on file in past 12 months      BP Readings from Last 3 Encounters:   07/08/20 123/75   07/07/20 132/70   06/19/20 139/76               lisinopriL (PRINIVIL,ZESTRIL) 40 MG tablet [Pharmacy Med Name: LISINOPRIL 40 MG Tablet] 90 tablet 1     Sig: TAKE 1 TABLET EVERY DAY       ACE Inhibitors Refill Protocol Passed - 8/3/2020 11:30 AM        Passed - Patient is not currently pregnant.        Passed - No positive pregnancy test in past 12 months        Passed - Serum Creatinine less than 1.4 on file in the past 12 months     Lab Results   Component Value Date    CREATININE 0.8 07/28/2020    CREATININE 0.8 12/10/2019    CREATININE 0.8 07/19/2019     Lab Results   Component Value Date    ESTGFRAFRICA >60.0 07/28/2020    ESTGFRAFRICA >60 12/10/2019    ESTGFRAFRICA >60 07/19/2019               Passed - Visit with authorizing provider in past 12 months or upcoming 90 days         Passed - Serum Potassium less than 5.2 on file in the past 12 months     Lab Results   Component Value Date    K 4.5 07/28/2020    K 5.0 12/10/2019    K 3.9 07/19/2019                  Passed - Blood Pressure under 139/89 on file in past 12 months      BP Readings from Last 3 Encounters:   07/08/20 123/75   07/07/20 132/70    06/19/20 139/76                   Appointments  past 12m or future 3m with PCP    Date Provider   Last Visit   3/10/2020 Renetta Simon MD   Next Visit   Visit date not found Renetta Simon MD   ED visits in past 90 days: 0     Note composed:4:36 PM 08/03/2020

## 2020-08-03 NOTE — TELEPHONE ENCOUNTER
Encounter details (provider/department) have been updated by OR staff.   Of note, HF details may not display.     As of this time Protocols and CDM: Does not populate or display     Updated: Provider    Will resend refill request encounter to P Centralized Refill Staff Pool.     [facility]     Note composed:11:29 AM 08/03/2020

## 2020-08-05 ENCOUNTER — OFFICE VISIT (OUTPATIENT)
Dept: BARIATRICS | Facility: CLINIC | Age: 70
End: 2020-08-05
Payer: MEDICARE

## 2020-08-05 VITALS
HEIGHT: 66 IN | SYSTOLIC BLOOD PRESSURE: 112 MMHG | HEART RATE: 87 BPM | BODY MASS INDEX: 47.09 KG/M2 | DIASTOLIC BLOOD PRESSURE: 70 MMHG | WEIGHT: 293 LBS

## 2020-08-05 DIAGNOSIS — G47.33 OBSTRUCTIVE SLEEP APNEA: ICD-10-CM

## 2020-08-05 DIAGNOSIS — I10 ESSENTIAL HYPERTENSION: ICD-10-CM

## 2020-08-05 DIAGNOSIS — K21.9 GASTROESOPHAGEAL REFLUX DISEASE, ESOPHAGITIS PRESENCE NOT SPECIFIED: ICD-10-CM

## 2020-08-05 DIAGNOSIS — E78.2 MIXED HYPERLIPIDEMIA: ICD-10-CM

## 2020-08-05 DIAGNOSIS — E66.01 CLASS 3 SEVERE OBESITY DUE TO EXCESS CALORIES WITH SERIOUS COMORBIDITY AND BODY MASS INDEX (BMI) OF 60.0 TO 69.9 IN ADULT: Primary | ICD-10-CM

## 2020-08-05 PROCEDURE — 3078F PR MOST RECENT DIASTOLIC BLOOD PRESSURE < 80 MM HG: ICD-10-PCS | Mod: CPTII,S$GLB,, | Performed by: STUDENT IN AN ORGANIZED HEALTH CARE EDUCATION/TRAINING PROGRAM

## 2020-08-05 PROCEDURE — 3008F PR BODY MASS INDEX (BMI) DOCUMENTED: ICD-10-PCS | Mod: CPTII,S$GLB,, | Performed by: STUDENT IN AN ORGANIZED HEALTH CARE EDUCATION/TRAINING PROGRAM

## 2020-08-05 PROCEDURE — 3074F PR MOST RECENT SYSTOLIC BLOOD PRESSURE < 130 MM HG: ICD-10-PCS | Mod: CPTII,S$GLB,, | Performed by: STUDENT IN AN ORGANIZED HEALTH CARE EDUCATION/TRAINING PROGRAM

## 2020-08-05 PROCEDURE — 1125F PR PAIN SEVERITY QUANTIFIED, PAIN PRESENT: ICD-10-PCS | Mod: S$GLB,,, | Performed by: STUDENT IN AN ORGANIZED HEALTH CARE EDUCATION/TRAINING PROGRAM

## 2020-08-05 PROCEDURE — 3008F BODY MASS INDEX DOCD: CPT | Mod: CPTII,S$GLB,, | Performed by: STUDENT IN AN ORGANIZED HEALTH CARE EDUCATION/TRAINING PROGRAM

## 2020-08-05 PROCEDURE — 1159F MED LIST DOCD IN RCRD: CPT | Mod: S$GLB,,, | Performed by: STUDENT IN AN ORGANIZED HEALTH CARE EDUCATION/TRAINING PROGRAM

## 2020-08-05 PROCEDURE — 99213 OFFICE O/P EST LOW 20 MIN: CPT | Mod: S$GLB,,, | Performed by: STUDENT IN AN ORGANIZED HEALTH CARE EDUCATION/TRAINING PROGRAM

## 2020-08-05 PROCEDURE — 3074F SYST BP LT 130 MM HG: CPT | Mod: CPTII,S$GLB,, | Performed by: STUDENT IN AN ORGANIZED HEALTH CARE EDUCATION/TRAINING PROGRAM

## 2020-08-05 PROCEDURE — 99213 PR OFFICE/OUTPT VISIT, EST, LEVL III, 20-29 MIN: ICD-10-PCS | Mod: S$GLB,,, | Performed by: STUDENT IN AN ORGANIZED HEALTH CARE EDUCATION/TRAINING PROGRAM

## 2020-08-05 PROCEDURE — 1159F PR MEDICATION LIST DOCUMENTED IN MEDICAL RECORD: ICD-10-PCS | Mod: S$GLB,,, | Performed by: STUDENT IN AN ORGANIZED HEALTH CARE EDUCATION/TRAINING PROGRAM

## 2020-08-05 PROCEDURE — 99999 PR PBB SHADOW E&M-EST. PATIENT-LVL IV: CPT | Mod: PBBFAC,,, | Performed by: STUDENT IN AN ORGANIZED HEALTH CARE EDUCATION/TRAINING PROGRAM

## 2020-08-05 PROCEDURE — 99999 PR PBB SHADOW E&M-EST. PATIENT-LVL IV: ICD-10-PCS | Mod: PBBFAC,,, | Performed by: STUDENT IN AN ORGANIZED HEALTH CARE EDUCATION/TRAINING PROGRAM

## 2020-08-05 PROCEDURE — 3078F DIAST BP <80 MM HG: CPT | Mod: CPTII,S$GLB,, | Performed by: STUDENT IN AN ORGANIZED HEALTH CARE EDUCATION/TRAINING PROGRAM

## 2020-08-05 PROCEDURE — 1125F AMNT PAIN NOTED PAIN PRSNT: CPT | Mod: S$GLB,,, | Performed by: STUDENT IN AN ORGANIZED HEALTH CARE EDUCATION/TRAINING PROGRAM

## 2020-08-05 PROCEDURE — 1101F PT FALLS ASSESS-DOCD LE1/YR: CPT | Mod: CPTII,S$GLB,, | Performed by: STUDENT IN AN ORGANIZED HEALTH CARE EDUCATION/TRAINING PROGRAM

## 2020-08-05 PROCEDURE — 1101F PR PT FALLS ASSESS DOC 0-1 FALLS W/OUT INJ PAST YR: ICD-10-PCS | Mod: CPTII,S$GLB,, | Performed by: STUDENT IN AN ORGANIZED HEALTH CARE EDUCATION/TRAINING PROGRAM

## 2020-08-05 RX ORDER — FERROUS GLUCONATE 324(38)MG
1 TABLET ORAL DAILY
COMMUNITY
Start: 2020-07-07 | End: 2021-03-30

## 2020-08-05 RX ORDER — DICLOFENAC SODIUM 10 MG/G
GEL TOPICAL
COMMUNITY
End: 2021-01-28

## 2020-08-05 RX ORDER — SEMAGLUTIDE 1.34 MG/ML
0.25 INJECTION, SOLUTION SUBCUTANEOUS
Qty: 1.5 ML | Refills: 0 | Status: SHIPPED | OUTPATIENT
Start: 2020-08-05 | End: 2020-09-08 | Stop reason: SDUPTHER

## 2020-08-05 NOTE — PROGRESS NOTES
"Subjective:       Patient ID: Ca Coats is a 69 y.o. female.    Chief Complaint: weight gain, follow-up    Patient presents today for follow-up. She is frustrated by the lack of weight loss. She had been taking metformin 500 mg twice daily. She reported no adverse side effects, but doesn't feel that it is working for weight loss.    Patient did follow-up up with optometry for suspected glaucoma. There were no signs of glaucoma presently, but open angle with borderline findings due to "increased c/d ratio R>L"    Diet Recall: ham and tomato sandwich, Cheerios, boiled eggs, lettuce, whole wheat bread, spoon of peanut butter, fruit    Co-morbidities associated with obesity:   HTN on Norvasc and lisinopril  HLD on Lipitor and fish oil  GERD on Protonix  LAURA on CPAP  Osteoarthritis, DJD    Weight Loss History:  7/7/2020: 394 lbs, BMI 63.73; started metformin 500 mg twice daily  8/5/2020: 393 lbs, BMI 63.52; discontinue metformin, start Ozempic 0.25 mg weekly injection    Review of Systems   Constitutional: Negative for chills and fever.   HENT: Negative for sore throat and trouble swallowing.    Eyes: Negative for visual disturbance.   Respiratory: Positive for shortness of breath.    Cardiovascular: Negative for chest pain and palpitations.   Gastrointestinal: Positive for reflux. Negative for abdominal pain, nausea and vomiting.   Musculoskeletal: Positive for arthralgias, back pain and gait problem.   Integumentary:  Negative for rash.   Neurological: Negative for dizziness and light-headedness.   Psychiatric/Behavioral: The patient is not nervous/anxious.          Objective:     LABS     Ref. Range 7/28/2020 09:52   Sodium Latest Ref Range: 136 - 145 mmol/L 142   Potassium Latest Ref Range: 3.5 - 5.1 mmol/L 4.5   Chloride Latest Ref Range: 95 - 110 mmol/L 107   CO2 Latest Ref Range: 23 - 29 mmol/L 27   Anion Gap Latest Ref Range: 8 - 16 mmol/L 8   BUN, Bld Latest Ref Range: 8 - 23 mg/dL 14   Creatinine " Latest Ref Range: 0.5 - 1.4 mg/dL 0.8   eGFR if non African American Latest Ref Range: >60 mL/min/1.73 m^2 >60.0   eGFR if African American Latest Ref Range: >60 mL/min/1.73 m^2 >60.0   Glucose Latest Ref Range: 70 - 110 mg/dL 96   Calcium Latest Ref Range: 8.7 - 10.5 mg/dL 10.0   Alkaline Phosphatase Latest Ref Range: 55 - 135 U/L 96   PROTEIN TOTAL Latest Ref Range: 6.0 - 8.4 g/dL 7.2   Albumin Latest Ref Range: 3.5 - 5.2 g/dL 3.6   BILIRUBIN TOTAL Latest Ref Range: 0.1 - 1.0 mg/dL 0.5   AST Latest Ref Range: 10 - 40 U/L 12   ALT Latest Ref Range: 10 - 44 U/L 11   Triglycerides Latest Ref Range: 30 - 150 mg/dL 112   Cholesterol Latest Ref Range: 120 - 199 mg/dL 101 (L)   HDL Latest Ref Range: 40 - 75 mg/dL 49   Hdl/Cholesterol Ratio Latest Ref Range: 20.0 - 50.0 % 48.5   LDL Cholesterol External Latest Ref Range: 63.0 - 159.0 mg/dL 29.6 (L)   Non-HDL Cholesterol Latest Units: mg/dL 52   Total Cholesterol/HDL Ratio Latest Ref Range: 2.0 - 5.0  2.1   Vit D, 25-Hydroxy Latest Ref Range: 30 - 96 ng/mL 54   Hemoglobin A1C External Latest Ref Range: 4.0 - 5.6 % 4.7   Estimated Avg Glucose Latest Ref Range: 68 - 131 mg/dL 88     Patient unable to stand on InBody Scale  Weight: 393 lbs  BMI: 63.52    Vitals:    08/05/20 1117   BP: 112/70   Pulse: 87       Physical Exam  Vitals signs reviewed.   Constitutional:       General: She is not in acute distress.     Appearance: Normal appearance. She is obese. She is not ill-appearing, toxic-appearing or diaphoretic.   HENT:      Head: Normocephalic and atraumatic.   Eyes:      Extraocular Movements: Extraocular movements intact.   Neck:      Musculoskeletal: Normal range of motion.   Cardiovascular:      Rate and Rhythm: Normal rate.   Pulmonary:      Effort: Pulmonary effort is normal. No respiratory distress.   Musculoskeletal:      Comments: Using a wheel chair   Skin:     General: Skin is warm and dry.   Neurological:      General: No focal deficit present.      Mental  Status: She is alert and oriented to person, place, and time.   Psychiatric:         Mood and Affect: Mood normal.         Behavior: Behavior normal.         Thought Content: Thought content normal.         Judgment: Judgment normal.         Assessment:       1. Class 3 severe obesity due to excess calories with serious comorbidity and body mass index (BMI) of 60.0 to 69.9 in adult    2. Essential hypertension    3. Mixed hyperlipidemia    4. Gastroesophageal reflux disease, esophagitis presence not specified    5. Obstructive sleep apnea        Plan:   - Ozempic 0.25 mg weekly injections    - Log all food and beverage intake with a daily calorie goal of 1500 calories    - Given bariatric meal plan and snacks in AVS    - Given seated resistance band exercises in AVS    - Return to clinic in 4 weeks

## 2020-08-05 NOTE — PATIENT INSTRUCTIONS
Ozempic 0.25 mg injection once a week for 4 weeks.  Decrease portions as soon as you start Ozempic. Some nausea in the first 2 weeks is not unusual.   If you get pain across the upper abdomen and around to your back, please call the office.       1200- 1500 calorie Sample meal plan   80-120g protein per day.   Protein drinks and bars: 0-4 grams sugar   Drink lots of water throughout the day and exercise!     MENU # 1   Breakfast: 2 eggs, 1 turkey sausage heather, 1 apple   Snack: P3 protein pack  Lunch: 2 roll-ups (sliced turkey, low-fat sliced cheese, mustard), 12 baby carrots dipped in 1 Tbsp natural peanut butter   Mid-Day Snack: ¼ cup unsalted almonds, ½ cup fruit   Dinner: 1 chicken thigh simmered in tomato sauce + 2 Tbsp mozzarella cheese, ½ cup black beans, 1/2 cup steamed carrots   Evening Snack: 1/2 cup grapes with 4 cubes low-fat cheddar cheese     MENU # 2   Breakfast: 200 calorie protein drink   Mid-morning snack : ¼ cup unsalted nuts, medium banana   Lunch: 3oz tuna or chicken salad made with 2 tbsp light bell, over salad with tomatoes and cucumbers.   Snack: low-fat cheese stick   Dinner: 3oz hamburger heather, slice of low-fat cheese, 1 cup yellow squash and zucchini sauteed in nonstick cookspray  Snack: 6oz light yogurt     Menu #3   Breakfast: 6oz plain Greek yogurt + fruit (½ banana OR ½ cup fruit - pineapple, blueberries, strawberries, peach), may add Splenda to louisa.   Lunch: Grilled chicken breast w/ slice low-fat pepper nani cheese, 1/2 cup grilled/baked asparagus and small salad with Salad Spritzer.   Mid-Day snack: 200 calorie protein drink   Dinner: 4oz Grilled fish, ½ cup white beans, ½ cup cooked spinach   Evening Snack: fudgsicle no-sugar-added     Menu # 4   Breakfast: 1 scoop vanilla protein powder + 4oz skim milk + 4oz coffee   Snack: Pure protein bar   Lunch: 2 Lettuce tacos: 3oz seasoned ground turkey wrapped in a [place] lettuce leaves with 1 Tbsp shredded cheese and dollop low-fat  sour cream   Snack: ½ cup cottage cheese, ½ cup pineapple chunks   Dinner: Shrimp omelet: 2 eggs, ½ cup shrimp, green onions, and shredded cheese      Menu #5   Breakfast: 1 cup low-fat cottage cheese, ½ cup peaches (no sugar added)   Snack: 1 apple, 4 cubes cheese   Lunch: 3oz baked pork chop, 1 cup okra and tomato stew   Snack: 1/2 cup black beans + salsa + dollop light sour cream   Dinner: Caprese chicken salad: 3 oz chicken breast, 1oz fresh mozzarella, sliced tomato, 1 Tbsp olive oil, basil   Snack: sugar-free popsicle     Menu #6   Breakfast: ½ cup part-skim ricotta cheese, 2 Tbsp sugar-free strawberry preserves, sprinkle of slivered almonds   Snack: 1 orange + string cheese  Lunch: 3 oz canned chicken, 1oz shredded cheddar cheese, ½ cup black beans, 2 Tbsp salsa   Snack: 200 calorie Protein drink   Dinner: 4 oz grilled salmon steak, over mixed green salad with 2 tbsp light dressing     Menu #7  Breakfast: crust-less quiche (bake 1 egg mixed w/ low fat cheese, broccoli and low sodium ham in a muffin cup until cooked inside)  Snack: 1 light yogurt  Lunch: protein shake (1 scoop powder + 8 oz skim milk, blended w/ ice)  Snack: small apple w/ 2 tbsp PB2 (powdered peanut butter)  Dinner: 2 Turkey meatballs w/ low sugar marinara sauce, 1/2 cup spiralized zucchini (sauteed on stove top w/ nonstick cookspray)    Snacks: (100-200 calories; >5g protein)     - 1 low-fat cheese stick with 8 cherry tomatoes or 1 serving fresh fruit  - 4 thin slices fat-free turkey breast and 1 slice low-fat cheese  - 4 thin slices fat-free honey ham with wedge of melon  - 2 slices of turkey vinson  - Boiled eggs (can buy at [employer] already boiled w/ shell removed)  - for convenience,  [place] read, snack, go (deli meat and cheese rolls)  - P3 packets (Protein packs w/ cheese, nuts, lean deli meat)  - [facility] Fit and Lean Protein Pudding (find at [employer] - per 1 cup serving = 100 calories, 15 g protein, 0 g sugar)  - 6-8 keatonme  "pods (equivalent to about 1/4 cup edamame without pods).   - 1/4 cup unsalted nuts with ½ cup fruit  - 6-oz container Dannon Light n Fit vanilla yogurt, topped with 1oz unsalted nuts         - apple, celery or baby carrots spread with 2 Tbsp PB2  - apple slices with 1 oz slice low-fat cheese  - Apple slices dipped in 2 Tbsp of PB2  - 2 Tbsp PB2 mixed in light or greek yogurt or sugar-free pudding  - celery, cucumber, bell pepper or baby carrots dipped in ¼ cup hummus bean spread   - celery, cucumber, baby carrots dipped in high protein greek yogurt (Mix 16 oz plain greek yogurt + 1 packet of [place] ranch dip mix)  - Mike Links Beef Steak - 14g protein! (similar to beef jerky but very lean)  - 2 wedges Laughing Cow - Light Herb & Garlic Cheese with sliced cucumber or green bell pepper  - 1/2 cup low-fat cottage cheese with ¼ cup fruit or ¼ cup salsa  - 1/2 cup low fat cottage cheese with 10-15 cherry tomatoes  - 8 oz glass of FAIRLIFE fat free milk (13 g protein)  - 8 oz glass of FAIRLIFE fat free milk + 1 packet of sugar-free hot cocoa  - Add Atkins advantage Cafe Caramel shake to decaf coffee. Serve hot or blend with ice for "frappaccino" like drink  - RTD Protein drinks: Atkins, Low Carb Slim Fast, EAS light, Muscle Milk Light, etc.  - Homemade Protein drinks: GNC Soy95, Isopure, Nectar, UNJURY, Whey Gourmet, etc. Mix 1 scoop powder with 8oz skim/1% milk or light soymilk.  - Protein bars: Atkins, EAS, Pure Protein,  Quest, Think Thin, Detour, etc. Must have 0-4 grams sugar - Read the label.     ** Be CREATIVE. You can always snack on bites of grilled chicken or tuna salad made with low fat bell, if needed!         SEATED RESISTANCE BAND EXERCISES    If you do not have a resistance band, or do not feel comfortable using a resistance band, these exercises can also be done holding a light hand weight or water bottle.  If you are just starting to exercise, you may want to go through the motions without any " weights or resistance till you become comfortable with the movements.    Do each of the movements shown 10 times (10 repetitions). You can repeat the exercises a second or  third time as well for greater benefit. The amount of tension on the resistance bands should be adjusted so  you can complete one set of 10 repetitions with effort. Increase the tension every few weeks. Do these  exercises 2 or 3 times a week.    * If an exercise hurts your back or joints, stop doing that particular exercise, but keep doing all the others *      CHEST EXERCISE        Start Position:   Sit tall, with feet shoulder width apart and feet in front of knees.   Belly pulled in.   Place the band around your upper back, grasp band in each hand, knuckles (rings) facing front. Arms  should be bent so that knuckles are in front of elbows   Slide shoulder blades down your back and slightly together (as if making a V).   Relax your neck.    To Perform This Exercise:   Press hands forward to lengthen arms using chest muscles (not arms!).   Dont arch your back!)   Return to start position and repeat 10 times.   Breathe!        BACK EXERCISE        Start Position:   Sit tall, with feet shoulder width apart and feet in front of knees.   Belly pulled in.   Grasp band in each hand and raise overhead. Arms should be slightly wider than shoulder width and no  slack in the band.   Slide shoulder blades down your back and slightly together (as if making a V).   Relax your neck.    To Perform This Exercise:   Open arms pulling down towards chest using upper back muscles (not arms!).   Squeeze through shoulder blades at the bottom of the movement (dont arch your back!).   Return to start position and repeat 10 times.   Breathe!        SHOULDER EXERCISE        Start Position:   Sit tall, with feet shoulder width apart and feet in front of knees.   Belly pulled in.   Sit on band so that you can grasp band in one hand with  tension on the band, but not so much tension that  you cannot straighten the arm. It may take a few tries to find the right amount of tension.   Slide shoulder blades down your back and slightly together (as if making a V).   Relax your neck.    To Perform This Exercise:   Press fist to the ceiling, slightly in front of the body.   SLOWLY return to start position and repeat 10 times.   Switch sides and repeat on the other side.   Breathe!        TRICEPS EXERCISE        Start Position:   Sit tall, with feet shoulder width apart and feet in front of knees.   Belly pulled in.   Sit on one end of the band. Grasp other end of band in one hand.   Point elbow directly toward the ceiling (if this is difficult, you may support the upper arm with the opposite  hand)   Be sure there is no slack in the band in the starting position.   Slide shoulder blades down your back and slightly together (as if making a V).   Relax your neck.    To Perform This Exercise:   Extend your arm up to the ceiling, as shown.   Squeeze through shoulder blades at the bottom of the movement (dont arch your back!).   SLOWLY return to start position and repeat 10 times.   Repeat on the other side.   Breathe!        BICEP EXERCISE        Start Position:   Sit tall, with feet shoulder width apart and feet in front of knees.   Belly pulled in.   Place center of exercise band under one foot and step the end of the band under the other foot.   Grasp each end of the band with one hand. Make sure there is no slack between your foot and the hand  that holds the band.   Hold your elbows to your sides.   Pull abdominals in, lift chest, press shoulders down and back.    To Perform This Exercise:   As you curl up, keep your wrist from changing position in relation to your forearm and your arm stable  from the shoulder to the elbow.   Bend and straighten your elbow in a slow and controlled movement. Repeat this motion 10 times.    Repeat on the other side.   Breathe!

## 2020-08-06 ENCOUNTER — TELEPHONE (OUTPATIENT)
Dept: SLEEP MEDICINE | Facility: CLINIC | Age: 70
End: 2020-08-06

## 2020-08-06 DIAGNOSIS — G89.4 CHRONIC PAIN DISORDER: ICD-10-CM

## 2020-08-06 DIAGNOSIS — G47.33 OSA (OBSTRUCTIVE SLEEP APNEA): Primary | ICD-10-CM

## 2020-08-06 DIAGNOSIS — G89.29 CHRONIC PAIN OF LEFT KNEE: ICD-10-CM

## 2020-08-06 DIAGNOSIS — M25.562 CHRONIC PAIN OF LEFT KNEE: ICD-10-CM

## 2020-08-06 DIAGNOSIS — M17.12 PRIMARY OSTEOARTHRITIS OF LEFT KNEE: ICD-10-CM

## 2020-08-06 RX ORDER — OXYCODONE AND ACETAMINOPHEN 5; 325 MG/1; MG/1
1 TABLET ORAL 3 TIMES DAILY PRN
Qty: 90 TABLET | Refills: 0 | Status: SHIPPED | OUTPATIENT
Start: 2020-08-06 | End: 2020-09-03 | Stop reason: SDUPTHER

## 2020-08-06 NOTE — TELEPHONE ENCOUNTER
Spoke to the pt and provider her with the information that PADMINI Donnell placed an order for a new machine and that she may need a recent office visit if [facility] contacts us. I just faxed her order to [facility].

## 2020-08-06 NOTE — TELEPHONE ENCOUNTER
Please inform pt that I have placed an order for new CPAP machine for [facility]. After getting new machine, RTC 31 - 90 days.     However, [employer] [facility] may notify us that she first requires face to face visit, since her last office visit was August 2019. There's a possibility right now they are more relaxed with these rules because of COVID.     Lastly, if she has not taken the machine to [facility], I recommend she take it there in case they can easily repair it or provide her a loaner machine so she does not have to go many nights without therapy.

## 2020-08-06 NOTE — TELEPHONE ENCOUNTER
Patient requesting refill on percocet  Last office visit 07/08/20   shows last refill on 07/0820  Patient does have a pain contract on file with [facility]  Patient last UDS 10/04/2019 was consistent with current therapy

## 2020-08-06 NOTE — TELEPHONE ENCOUNTER
----- Message from Makenna Whyte sent at 8/6/2020 12:42 PM CDT -----  Regarding: CPAP Machine  Type: Patient Call Back    Who called:Self    What is the request in detail:Patient called to get a prescription for a new CPAP machine, she stated the one she has no longer works    Can the clinic reply by [facility]? no    Would the patient rather a call back or a response via [facility]? Callback    Best call back [phone]

## 2020-08-10 ENCOUNTER — TELEPHONE (OUTPATIENT)
Dept: SLEEP MEDICINE | Facility: CLINIC | Age: 70
End: 2020-08-10

## 2020-08-10 ENCOUNTER — PATIENT OUTREACH (OUTPATIENT)
Dept: ADMINISTRATIVE | Facility: OTHER | Age: 70
End: 2020-08-10

## 2020-08-10 ENCOUNTER — TELEPHONE (OUTPATIENT)
Dept: PAIN MEDICINE | Facility: CLINIC | Age: 70
End: 2020-08-10

## 2020-08-10 NOTE — TELEPHONE ENCOUNTER
Staff spoke with patient regarding appointment 8/11/20 at 1:20pm with Savanna Hyatt Np as in office visit and to inform patient of direct line to call before entering the building also to arrive 15 minutes early but we ask that patient come alone unless its an essential visitor patient verbalized understanding.

## 2020-08-10 NOTE — TELEPHONE ENCOUNTER
----- Message from Christen Gannon sent at 8/10/2020  1:13 PM CDT -----  Name of Who is Calling: LU RENAE [[id number]]    What is the request in detail: LU RENAE [[id number]]  is calling in regards to cpap machine ... Please contact to further discuss and advise      Can the clinic reply by [facility]: no     What [facility] to [facility] Back [facility]  [phone] (home)

## 2020-08-10 NOTE — TELEPHONE ENCOUNTER
Returned pt's call regarding her CPAP machine. Pt stated that she is in need of a new order for her CPAP machine

## 2020-08-10 NOTE — TELEPHONE ENCOUNTER
----- Message from Christen Gannon sent at 8/10/2020  1:13 PM CDT -----  Name of Who is Calling: LU RENAE [[id number]]    What is the request in detail: LU RENAE [[id number]]  is calling in regards to cpap machine ... Please contact to further discuss and advise      Can the clinic reply by [facility]: no     [facility]  [phone] (home)

## 2020-08-10 NOTE — TELEPHONE ENCOUNTER
Returned pt's call regarding her CPAP machine. Pt stated that she is in need of a new order for a CPAP machine. Please advise.

## 2020-08-11 ENCOUNTER — TELEPHONE (OUTPATIENT)
Dept: PAIN MEDICINE | Facility: CLINIC | Age: 70
End: 2020-08-11

## 2020-08-11 ENCOUNTER — OFFICE VISIT (OUTPATIENT)
Dept: PAIN MEDICINE | Facility: CLINIC | Age: 70
End: 2020-08-11
Payer: MEDICARE

## 2020-08-11 ENCOUNTER — TELEPHONE (OUTPATIENT)
Dept: SLEEP MEDICINE | Facility: CLINIC | Age: 70
End: 2020-08-11

## 2020-08-11 VITALS
SYSTOLIC BLOOD PRESSURE: 118 MMHG | HEIGHT: 66 IN | BODY MASS INDEX: 47.09 KG/M2 | RESPIRATION RATE: 18 BRPM | WEIGHT: 293 LBS | DIASTOLIC BLOOD PRESSURE: 66 MMHG | OXYGEN SATURATION: 100 % | HEART RATE: 86 BPM

## 2020-08-11 DIAGNOSIS — Z51.81 ENCOUNTER FOR MONITORING OPIOID MAINTENANCE THERAPY: ICD-10-CM

## 2020-08-11 DIAGNOSIS — M25.562 CHRONIC PAIN OF LEFT KNEE: ICD-10-CM

## 2020-08-11 DIAGNOSIS — G89.29 CHRONIC PAIN OF LEFT KNEE: ICD-10-CM

## 2020-08-11 DIAGNOSIS — G89.4 CHRONIC PAIN DISORDER: Primary | ICD-10-CM

## 2020-08-11 DIAGNOSIS — M17.12 PRIMARY OSTEOARTHRITIS OF LEFT KNEE: ICD-10-CM

## 2020-08-11 DIAGNOSIS — Z79.891 ENCOUNTER FOR MONITORING OPIOID MAINTENANCE THERAPY: ICD-10-CM

## 2020-08-11 PROCEDURE — 99213 PR OFFICE/OUTPT VISIT, EST, LEVL III, 20-29 MIN: ICD-10-PCS | Mod: S$GLB,,, | Performed by: NURSE PRACTITIONER

## 2020-08-11 PROCEDURE — 1125F AMNT PAIN NOTED PAIN PRSNT: CPT | Mod: S$GLB,,, | Performed by: NURSE PRACTITIONER

## 2020-08-11 PROCEDURE — 3078F DIAST BP <80 MM HG: CPT | Mod: CPTII,S$GLB,, | Performed by: NURSE PRACTITIONER

## 2020-08-11 PROCEDURE — 1159F MED LIST DOCD IN RCRD: CPT | Mod: S$GLB,,, | Performed by: NURSE PRACTITIONER

## 2020-08-11 PROCEDURE — 3008F BODY MASS INDEX DOCD: CPT | Mod: CPTII,S$GLB,, | Performed by: NURSE PRACTITIONER

## 2020-08-11 PROCEDURE — 1101F PR PT FALLS ASSESS DOC 0-1 FALLS W/OUT INJ PAST YR: ICD-10-PCS | Mod: CPTII,S$GLB,, | Performed by: NURSE PRACTITIONER

## 2020-08-11 PROCEDURE — 3074F SYST BP LT 130 MM HG: CPT | Mod: CPTII,S$GLB,, | Performed by: NURSE PRACTITIONER

## 2020-08-11 PROCEDURE — 3078F PR MOST RECENT DIASTOLIC BLOOD PRESSURE < 80 MM HG: ICD-10-PCS | Mod: CPTII,S$GLB,, | Performed by: NURSE PRACTITIONER

## 2020-08-11 PROCEDURE — 3074F PR MOST RECENT SYSTOLIC BLOOD PRESSURE < 130 MM HG: ICD-10-PCS | Mod: CPTII,S$GLB,, | Performed by: NURSE PRACTITIONER

## 2020-08-11 PROCEDURE — 1125F PR PAIN SEVERITY QUANTIFIED, PAIN PRESENT: ICD-10-PCS | Mod: S$GLB,,, | Performed by: NURSE PRACTITIONER

## 2020-08-11 PROCEDURE — 99999 PR PBB SHADOW E&M-EST. PATIENT-LVL V: ICD-10-PCS | Mod: PBBFAC,,, | Performed by: NURSE PRACTITIONER

## 2020-08-11 PROCEDURE — 1101F PT FALLS ASSESS-DOCD LE1/YR: CPT | Mod: CPTII,S$GLB,, | Performed by: NURSE PRACTITIONER

## 2020-08-11 PROCEDURE — 99999 PR PBB SHADOW E&M-EST. PATIENT-LVL V: CPT | Mod: PBBFAC,,, | Performed by: NURSE PRACTITIONER

## 2020-08-11 PROCEDURE — 99499 RISK ADDL DX/OHS AUDIT: ICD-10-PCS | Mod: S$PBB,HCNC,, | Performed by: NURSE PRACTITIONER

## 2020-08-11 PROCEDURE — 1159F PR MEDICATION LIST DOCUMENTED IN MEDICAL RECORD: ICD-10-PCS | Mod: S$GLB,,, | Performed by: NURSE PRACTITIONER

## 2020-08-11 PROCEDURE — 99213 OFFICE O/P EST LOW 20 MIN: CPT | Mod: S$GLB,,, | Performed by: NURSE PRACTITIONER

## 2020-08-11 PROCEDURE — 99499 UNLISTED E&M SERVICE: CPT | Mod: S$PBB,HCNC,, | Performed by: NURSE PRACTITIONER

## 2020-08-11 PROCEDURE — 3008F PR BODY MASS INDEX (BMI) DOCUMENTED: ICD-10-PCS | Mod: CPTII,S$GLB,, | Performed by: NURSE PRACTITIONER

## 2020-08-11 NOTE — TELEPHONE ENCOUNTER
----- Message from Christen Gannon sent at 8/11/2020  1:08 PM CDT -----  Name of Who is Calling: LU RENAE [[id number]]    What is the request in detail: LU RENAE [[id number]] is running late 20 mins late ...Please contact to further discuss and advise      Can the clinic reply by [facility]: no     [facility]  [phone]

## 2020-08-11 NOTE — PROGRESS NOTES
"Chronic patient Established Note (Follow up visit)    HPI:     Ca Coats is a 69 y.o. female who presents today with left knee pain. Her pain has been going on for "too long."  She was previously treated by Dr. Iyer at [facility].  She has injections [id number] with him.  These were helpful, but she does not know if the injections were steroid or viscosupplementation.   This pain is described in detail below.     Interval History (4/30/2018):  The patient returns to clinic today for follow up and records review. We did received records from [facility] including a MRI of her knee. Dr. Iyer's last office visit note from January 2017 does not include any previous injections. She continues to report bilateral knee pain, left greater than right. She describes this pain as constant and aching. This pain is worse with prolonged walking. She also report right shoulder pain with prolonged overhead activity. She continues to take Percocet with benefit. She denies any other health changes.     Interval History (5/31/2018):  The patient returns to clinic for follow up. She is s/p left knee Synvisc One injection on 5/8/2018. She reports 35% relief of her knee pain. She continues to report bilateral knee pain, left greater than right. She describes this pain as constant and aching. Her pain is worse with prolonged walking and standing. She is scheduled to see Orthopedics at the  End of this month. She is also following up with Bariatrics to discuss the lap band procedure. She continues to take Percocet with benefit. She also uses Voltaren gel with benefit but this is expensive for her. She denies any other health changes.          Interval History (7/2/2018):  The patient returns to clinic today for follow up. She continues to report left knee pain that is constant, sharp, and aching in nature. Her pain is worse with prolonged walking and standing. She is scheduled for weight loss surgery in August. She continues " "follow up with orthopedics who does not recommend surgery at this time due to her BMI. She continues to take Percocet as needed with benefit. She denies any other health changes.      Interval History (7/19/2018):  The patient returns to clinic today for follow up. She is s/p left genicular nerve block on 7/10/2018. She reports 80% relief of her left knee pain. She reports that she was able to walk for longer distances (3 blocks) after the block. Her pain has returned to baseline. She continues to report left knee pain that is constant, sharp, and aching in nature. Her pain is worse with prolonged walking and standing. She denies any other health changes.      Interval History (8/21/2018):  The patient returns to clinic today for follow up. She is s/p left genicular cooled RFA on 7/31/2018. She reports 60% relief of her left knee pain. She reports intermittent knee pain that is sore and aching in nature. She continues to perform a home exercise routine. She is actively trying to lose weight. She continues to follow up with Bariatric Surgery at [facility]. She is considering weight loss surgery. She continues to use compound cream with benefit. She denies any other health changes.      Interval History (11/21/2018):  The patient returns to clinic today for follow up. She reports increased left knee pain this past week. She reports 2 episodes where her knee has "locked" up on her while walking. She describes her knee pain as sore and aching. She is actively trying to lose weight and quit smoking. She continues to use the compound cream with benefit. She denies any other health changes.      Interval History (1/9/2019):  The patient returns to clinic today for follow up. She is s/p left knee steroid injection on 12/21/2018. She reports one day of relief. She continues to report left knee pain that is constant, sharp, and aching in nature. Her pain is worse with standing and walking. She reports that her knee "locks" up on " her while walking. She often feels as though she is going to fall. She is scheduled for bariatric weight loss surgery in February at [facility]. She denies any other health changes.      Interval History (2/28/2019):  The patient returns to clinic today for follow up. She is s/p Euflexxa series completed on 1/30/2019. She reports that she able to stand for longer periods of time since the procedure. She continues to report left knee pain that is sore and aching in nature. Her pain is worse with prolonged standing and walking. She was previously scheduled for weight loss surgery but reports this was canceled. She is scheduled for follow up next week about this. She denies any other health changes.      Interval History (4/12/2019):  The patient returns for f/u of left knee pain.  She is s/p left genicular cooled RFA with about 90% pain relief.  She has been able to walk easier.  She also notices less stiffness in her knee.  She is planning on gastric surgery prior to knee replacement.  She had benefit with compounding cream but it is no longer covered by her insurance.  She does take Percocet from her PCP.  Her pain today is 7/10.     Interval History (7/11/19):  Patient reported to ED today for increased L knee pain and an episode of LLE weakness that lead to a near fall. Patient is s/p L genicular RFA in 3/19/19 that provided 90% relief for 2 months then came back. Pain is a crunchy pain, in left knee, that does not radiate, worse with movement, better with rest. Pain today is 9/10. She normally uses cane to ambulate but is currently using wheelchair at hospital. Denies any trauma ot the area, fever/chills, n/v, abdominal pain, or HA.     Interval History (8/8/2019):  She returns today for follow up s/p left knee genicular chemodenervation with phenol 7/19/19.  She reports that this procedure did not provide any improvement in her left knee symptoms, and she is still having significant difficulty with ambulation, still  uses wheelchair for mobility. She also reports left lateral thigh and hip pain lateral upper thigh numbness. She has been admitted to [facility] but is not receiving PT there. She is still considering lap band surgery but has missed a followup appointment.      Interval History (9/4/2019):  The patient returns to clinic today for follow up. She continues to report intermittent left lateral thigh and hip pain that is tingling and numb in nature. She continues to report left knee pain. She continues to have difficulty ambulating due to pain. She is currently living in a SNF. She continues to take Gabapentin 300 mg BID. This was increased at last visit but not increased at the nursing home. She does report benefit with Percocet though it only last approximately 4-5 hours. She denies any adverse effects. She does present with a care attendant from the SNF today. Her pain today is 10/10.     Interval History (10/4/2019):  The patient returns to clinic today for follow up and medication refill. She continues to report bilateral knee pain that is aching in nature. She reports intermittent left lateral thigh pain that is tingling and shooting in nature. She reports that her pain has been improving since last visit. She has increased her activity. She continues to report benefit with Gabapentin. She continues to take Percocet as needed with benefit. She denies any other health changes. Her pain today is 0/10.     Interval History (1/6/2020):  The patient returns to clinic today for follow up. She reports increased left leg pain today that is tingling in nature. She continues to report bilateral knee pain, left greater than right. She describes this pain as constant, sharp, and aching. She is no longer living at the nursing facility. She has increased her home exercise routine. She is actively trying to quit smoking in order to move forward with bariatric surgery. She continues to report benefit with current  medication regimen. She takes Gabapentin and Percocet with benefit and without adverse effect. She denies any other health changes. Her pain today is 7/10.     Interval History (4/8/2020):  She returns today for follow up via audio.  She reports that the methocarbamol is not helping.  She continues to have a left-sided low back pain that is bothering her.  Her left knee continues to hurt.  Her right knee is hurting a little as well.  Percocet continues to help, but it is not lasting long enough.     Interval History (5/1/2020):  The patient returns for audio visit today for follow up. She continues to report bilateral knee pain, left greater than right. Her pain is worse with prolonged standing and walking. She reports that her back pain has improved. She continues to report benefit with current medication regimen. She continues to take Gabapentin and Percocet with benefit and without adverse effects. She denies any other health changes. Her pain today is 8/10.     Interval History (6/2/2020):  The patient returns to clinic today via audio visit for follow up of knee pain. She reports increased left knee pain in the last week. Her pain is worse with standing and walking. She feels as though she will fall when standing. She is currently using ice, heat, and OTC lidocaine patches with limited relief. She continues to take Gabapentin and Percocet with some benefit. She denies any adverse effects. She denies any other health changes. Her pain today is 10/10.     Interval History 7/8/2020:  Ca Coats presents to the clinic for a follow-up appointment for follow up after 6/19/20 RFA Since the last visit, Ca Coats states the pain has been persistant. Current pain intensity is 10/10.    Interval History 8/11/2020:  The patient returns to clinic today for follow up of knee pain. She is s/p left knee Orthovisc injection on 7/28/2020. She reports limited relief at this time. She continues to  report left knee pain, especially with standing and walking. She feels as though her knee will buckle. She has recently quit smoking. She is actively trying to lose weight. She was recently started on Ozempic per her PCP. She is following her diet plan. She continues to take Percocet with benefit but reports that it does not last. She denies any adverse effects with this medication. She denies any other health changes. Her pain today is 8/10.    Pain Disability Index Review:  Last 3 PDI Scores 8/11/2020 7/8/2020 1/6/2020   Pain Disability Index (PDI) 56 [phone]         Physical Therapy: Yes, she did this in 2018 for one month (twice a week). She does HEP (stretching and ROM in the morning)      Pain Medications:    Current:  · Gabapentin, tylenol 500mg Q6 prn, Percocet 5/325 mg TID PRN    Opioid Contract: yes     report:  Reviewed and consistent with medication use as prescribed.    Pain Procedures:   · 5/8/2018- Left knee Synvisc One injection  · 7/10/2018- Left genicular nerve block  · 7/31/2018- Left genicular cooled RFA  · 12/21/2018- Left knee steroid injection  · 1/31/2019- Left knee Euflexxa series  · 3/19/2019 Left genicular cooled RFA- 90% relief  · 7/19/19- Left knee genicular phenol chemodenervation-     Physical Therapy/Home Exercise: yes, she did this in 2018 for one month (twice a week). She does HEP (stretching and ROM in the morning)    Imaging:   MRI Left Knee 7/21/2017 (in media):  The medial meniscus is intact. The lateral meniscus is intact.      A chronic complete ACL tear is noted. The posterior cruciate ligament is intact. The medial and lateral retinacula are intact. The medial collateral ligament is intact. The lateral collateral ligament in intact. The posterolateral corner structures are intact.      There is full thickness fissuring of the central aspect medial femoral cartilage. The lateral tibiofemoral compartment cartilage is intact. There is broad thickness defect within the central  trochlear cartilage. There is mild lateral patellofemoral cartilage thinning and regional full thickness loss of the central superior patellar cartilage.      A small effusion is present. There is trace infrapatellar fluid. Tiny popliteal cyst is noted. Subtle focal marrow edema is seen within the posterior tibial plateau. The bone marrow signal is heterogeneous likely reflecting marrow reconversion.      There is trace pes anserine bursitis. The tendons are otherwise normal.      Grade 2 fatty streaking of the semimembranosus and vastus lateralis muscles are noted. There is mild prepatellar edema.      MRI Right Shoulder 7/21/2017 (in media):   There is a complete tear of the supraspinatus tendon with retraction to the glenohumeral joint. There is partial tearing of the anterior infraspinatus articular surface. Mild subscapularis tendinosis is noted. The teres minor is intact. A small amount of subacromial/subdeltoid fluid is noted. The proximal long head biceps tendon is poorly visualized proximally although intact distally.      Degenerative tearing of the anterosuperior through posterosuperior labrum is noted. There is moderate to severe superior glenoid cartilage thinning with subchondral degenerative changes. The glenohumeral ligaments appear grossly intact.      An os acromiale is seen with fluid and osteophytosis at its junction with the base of the acromion. There is moderate superior subluxation of the AC joint with mild osteophytosis.      Small effusion with subcoracoid extension is noted.      There is grade 2/3 fatty atrophy of the supraspinatus muscle, grade 2 of the infraspinatus and subscapularis. Heterogeneous signal is seen throughout the bone marrow likely reflecting marrow reconversion.        Allergies: Review of patient's allergies indicates:  No Known Allergies    Current Medications:   Current Outpatient Medications   Medication Sig Dispense Refill    acetaminophen (TYLENOL) 500 MG tablet        albuterol (PROAIR HFA) 90 mcg/actuation inhaler Inhale 2 puffs into the lungs every 6 (six) hours as needed for Wheezing. Rescue 18 g 2    amLODIPine (NORVASC) 10 MG tablet TAKE 1 TABLET EVERY DAY 90 tablet 1    atorvastatin (LIPITOR) 40 MG tablet Take 1 tablet (40 mg total) by mouth once daily. 90 tablet 3    B-complex with vitamin C (Z-BEC OR EQUIV) tablet Take 1 tablet by mouth once daily.       CALCIUM CITRATE-VITAMIN D2 ORAL Take 1 tablet by mouth once daily.       diclofenac sodium (VOLTAREN) 1 % Gel Voltaren 1 % topical gel      ferrous gluconate (FERGON) 324 MG tablet Take 1 tablet by mouth once daily.      ferrous gluconate 324 mg (37.5 mg iron) Tab tablet Take 1 tablet (324 mg total) by mouth daily with breakfast. 90 tablet 4    gabapentin (NEURONTIN) 300 MG capsule Take 2 capsules (600 mg total) by mouth 3 (three) times daily. 540 capsule 0    lisinopriL (PRINIVIL,ZESTRIL) 40 MG tablet TAKE 1 TABLET EVERY DAY 90 tablet 1    multivitamin with minerals tablet Take 1 tablet by mouth once daily.       omega 3-dha-epa-fish oil 1,000 mg (120 mg-180 mg) Cap       oxyCODONE-acetaminophen (PERCOCET) 5-325 mg per tablet Take 1 tablet by mouth 3 (three) times daily as needed for Pain. 90 tablet 0    pantoprazole (PROTONIX) 40 MG tablet Take 1 tablet (40 mg total) by mouth once daily. 90 tablet 1    semaglutide (OZEMPIC) 0.25 mg or 0.5 mg(2 mg/1.5 mL) PnIj Inject 0.25 mg into the skin every 7 days. 1.5 mL 0     Current Facility-Administered Medications   Medication Dose Route Frequency Provider Last Rate Last Dose    sodium hyaluronate (EUFLEXXA) 10 mg/mL(mw 2.4 -3.6 million) injection 20 mg  20 mg Intra-articular 1 time in Clinic/HOD Tevin Clark MD        sodium hyaluronate (EUFLEXXA) 10 mg/mL(mw 2.4 -3.6 million) injection 20 mg  20 mg Intra-articular 1 time in Clinic/FREIDA Clark MD        sodium hyaluronate (EUFLEXXA) 10 mg/mL(mw 2.4 -3.6 million) injection 20 mg  20 mg  Intra-articular 1 time in Clinic/HOD Tevin Clark MD        sodium hyaluronate (EUFLEXXA) 10 mg/mL(mw 2.4 -3.6 million) injection 20 mg  20 mg Intra-articular 1 time in Clinic/HOD Tevin Clark MD           REVIEW OF SYSTEMS:    GENERAL:  No weight loss, malaise or fevers.  HEENT:  Negative for frequent or significant headaches.  NECK:  Negative for lumps, goiter, pain and significant neck swelling.  RESPIRATORY:  Negative for cough, wheezing or shortness of breath. +LAURA  CARDIOVASCULAR:  Negative for chest pain, leg swelling or palpitations.  GI:  Negative for abdominal discomfort, blood in stools or black stools or change in bowel habits. +GERD  MUSCULOSKELETAL:  + Left knee pain  SKIN:  Negative for lesions, rash, and itching.  PSYCH:  Positive for  mood disorder and recent psychosocial stressors. +sleep disturbance  HEMATOLOGY/LYMPHOLOGY:  Negative for prolonged bleeding, bruising easily or swollen nodes.  NEURO:   No history of headaches, syncope, paralysis, seizures or tremors.  All other reviewed and negative other than HPI.    Past Medical History:  Past Medical History:   Diagnosis Date    Anemia     2018    Arthritis     BMI 60.0-69.9, adult     Cataract     Depression     Dry eye syndrome     GERD (gastroesophageal reflux disease)     Glaucoma suspect     History of gastric ulcer     Hyperlipidemia     Hypertension     Hypothyroidism     Morbid obesity     Neuromuscular disorder     Thyroid disease        Past Surgical History:  Past Surgical History:   Procedure Laterality Date    APPENDECTOMY      CATARACT EXTRACTION W/  INTRAOCULAR LENS IMPLANT Bilateral     MFIOL OU    INJECTION OF ANESTHETIC AGENT AROUND NERVE Left 7/10/2018    Procedure: BLOCK, NERVE;  Surgeon: Samantha Vidal MD;  [facility];  Service: Pain Management;  Laterality: Left;  Genicular     INJECTION OF ANESTHETIC AGENT AROUND NERVE Left 7/19/2019    Procedure: Block, Nerve NERVE BLOCK GENICULAR  "WITH PHENOL 6%;  Surgeon: Samantha Vidal MD;  Location: [facility];  Service: Pain Management;  Laterality: Left;  NEEDS CONSENT    RADIOFREQUENCY ABLATION Left 2020    Procedure: RADIOFREQUENCY ABLATION LEFT GENICULAR;  Surgeon: Tevin Clark MD;  Location: [facility];  Service: Pain Management;  Laterality: Left;  Left Genicular RFA       Family History:  Family History   Problem Relation Age of Onset    No Known Problems Mother     No Known Problems Father        Social History:  Social History     Socioeconomic History    Marital status: Single     Spouse name: Not on file    Number of children: 2    Years of education: Not on file    Highest education level: Not on file   Occupational History     Comment: retired  and cook   Social Needs    Financial resource strain: Not on file    Food insecurity     Worry: Not on file     Inability: Not on file    Transportation needs     Medical: Not on file     Non-medical: Not on file   Tobacco Use    Smoking status: Former Smoker     Packs/day: 0.25     Years: 50.00     Pack years: 12.50     Types: Cigarettes     Quit date: 2020     Years since quittin.6    Smokeless tobacco: Never Used   Substance and Sexual Activity    Alcohol use: Not Currently    Drug use: No    Sexual activity: Not Currently     Partners: Male   Lifestyle    Physical activity     Days per week: Not on file     Minutes per session: Not on file    Stress: Not on file   Relationships    Social connections     Talks on phone: Not on file     Gets together: Not on file     Attends Latter day service: Not on file     Active member of club or organization: Not on file     Attends meetings of clubs or organizations: Not on file     Relationship status: Not on file   Other Topics Concern    Not on file   Social History Narrative    Not on file       OBJECTIVE:    /66   Pulse 86   Resp 18   Ht 5' 6" (1.676 m)   Wt (!) 173.3 kg (382 lb 0.9 oz)   " SpO2 100%   BMI 61.67 kg/m²     PHYSICAL EXAMINATION:    General appearance: Well appearing, in no acute distress, alert and oriented x3.  Psych:  Mood and affect appropriate.  Skin: Skin color, texture, turgor normal, no rashes or lesions, in both upper and lower body.  Head/face:  Atraumatic, normocephalic. No palpable lymph nodes  Cor: RRR  Pulm: CTA  GI: Abdomen soft and non-tender.  Extremities: Peripheral joint ROM is full and pain free without obvious instability or laxity in all four extremities, except for limited ROM of the left knee. No deformities, edema, or skin discoloration. Good capillary refill.  Musculoskeletal: There is pain with palpation over medial and lateral joint line of left knee. Limited ROM with pain on extension of left knee. Crepitus noted to left knee. Shoulder, hip, and sacroiliac provocative maneuvers are negative. Bilateral upper and lower extremity strength is normal and symmetric.  No atrophy or tone abnormalities are noted.  Neuro: Bilateral upper and lower extremity coordination and muscle stretch reflexes are physiologic and symmetric.  Plantar response are downgoing. No loss of sensation is noted.  Gait: Antalgic- ambulates with wheelchair.     ASSESSMENT: 69 y.o. year old female with left knee pain, consistent with the followin. Chronic pain disorder     2. Chronic pain of left knee     3. Primary osteoarthritis of left knee     4. Encounter for monitoring opioid maintenance therapy           PLAN:     - Previous imaging was reviewed and discussed with the patient today.    - She is s/p left knee Orthovisc injection with limited relief at this time. We discussed that it may take 4 weeks to see full benefit.     - She is s/p left genicular RFA with limited relief.     - Continue follow up with Bariatrics regarding weight loss.     - Will obtain new pain contract today.     - Continue Percocet 5/325 mg TID PRN. She does not need a refill today. She may benefit from  opioid rotation in the future.     - The patient is here today for a refill of current pain medications and they believe these provide effective pain control and improvements in quality of life.  The patient notes no serious side effects, and feels the benefits outweigh the risks.  The patient was reminded of the pain contract that they signed previously as well as the risks and benefits of the medication including possible death.  The updated [place] Board  Pharmacy prescription monitoring program was reviewed, and the patient has been found to be compliant with current treatment plan.    - I have stressed the importance of physical activity and a home exercise plan to help with pain and improve health.    - RTC in 1 month.     - Dr. Clark was consulted on the patient and agrees with this plan.    The above plan and management options were discussed at length with patient. Patient is in agreement with the above and verbalized understanding.    Savanna Hyatt NP  08/11/2020

## 2020-08-11 NOTE — TELEPHONE ENCOUNTER
I called pt too let her know that MS. Meléndez  ordered the CPAP on 08/06/2020. I advised pt to follow-up directly with [facility] SINHA regarding set up. Pt didn't answer. LVM.

## 2020-08-11 NOTE — TELEPHONE ENCOUNTER
I ordered the CPAP on 08/06/2020. Advise pt to follow-up directly with [facility] SINHA regarding set up.

## 2020-08-12 ENCOUNTER — TELEPHONE (OUTPATIENT)
Dept: SLEEP MEDICINE | Facility: CLINIC | Age: 70
End: 2020-08-12

## 2020-08-12 NOTE — TELEPHONE ENCOUNTER
----- Message from Yuliet Pickens sent at 8/12/2020  1:29 PM CDT -----  Name of Who is Calling: LU RENAE [[id number]]      What is the request in detail: Pt is calling to speak to staff in regards to the status of her machine being ordered .... Please call to further assist .       Can the clinic reply by [facility]: N      What [facility] to [facility] Back [facility]: [phone]

## 2020-08-12 NOTE — TELEPHONE ENCOUNTER
Returned pt's call regarding her CPAP machine order. Pt was advised to contact her DME regarding getting her CPAP machine. I told pty that I will fax order to Olu again.

## 2020-08-14 ENCOUNTER — TELEPHONE (OUTPATIENT)
Dept: SLEEP MEDICINE | Facility: CLINIC | Age: 70
End: 2020-08-14

## 2020-08-14 NOTE — TELEPHONE ENCOUNTER
Olu informed me that insurance will not pay for a new machine until the current one is 5 years old. Pt will contact resmed, since she's under warranty, in order to get a new machine.

## 2020-08-14 NOTE — TELEPHONE ENCOUNTER
----- Message from Deneen Shah sent at 8/14/2020  2:48 PM CDT -----  Regarding: Update On CPAP Order  Name of Who is Calling : Leny ([facility])     Leny ([facility])  is requesting a call from staff in regards to cpap order received states patient is not eligible for a new machine yet she received her last one on 1/19/2018. States that she will be canceling the order  .....Please contact to further discuss and advise.    Can the clinic reply by [facility] : No    What Number to Call Back :  [phone]

## 2020-08-28 ENCOUNTER — OFFICE VISIT (OUTPATIENT)
Dept: OPHTHALMOLOGY | Facility: CLINIC | Age: 70
End: 2020-08-28
Payer: MEDICARE

## 2020-08-28 DIAGNOSIS — H26.491 PCO (POSTERIOR CAPSULAR OPACIFICATION), RIGHT: Primary | ICD-10-CM

## 2020-08-28 PROCEDURE — 92012 PR EYE EXAM, EST PATIENT,INTERMED: ICD-10-PCS | Mod: 57,S$GLB,, | Performed by: OPHTHALMOLOGY

## 2020-08-28 PROCEDURE — 66821 PR DISCISSION,2ND CATARACT,LASER: ICD-10-PCS | Mod: RT,S$GLB,, | Performed by: OPHTHALMOLOGY

## 2020-08-28 PROCEDURE — 66821 AFTER CATARACT LASER SURGERY: CPT | Mod: RT,S$GLB,, | Performed by: OPHTHALMOLOGY

## 2020-08-28 PROCEDURE — 99999 PR PBB SHADOW E&M-EST. PATIENT-LVL IV: ICD-10-PCS | Mod: PBBFAC,,, | Performed by: OPHTHALMOLOGY

## 2020-08-28 PROCEDURE — 99999 PR PBB SHADOW E&M-EST. PATIENT-LVL IV: CPT | Mod: PBBFAC,,, | Performed by: OPHTHALMOLOGY

## 2020-08-28 PROCEDURE — 92012 INTRM OPH EXAM EST PATIENT: CPT | Mod: 57,S$GLB,, | Performed by: OPHTHALMOLOGY

## 2020-08-28 NOTE — PROGRESS NOTES
HPI     Follow-up      Additional comments: YAG CAP               Comments     68 YO female presents today for YAG CAP OU referred by Dr. Gibson. She   states she is having trouble with her vision OU.           Last edited by Daisy Coughlin, PCT on 8/28/2020  2:23 PM. (History)            Assessment /Plan     For exam results, see Encounter Report.    PCO (posterior capsular opacification), right      Visually significant posterior capsular opacity present.  Discussed risks, benefits, and alternatives to laser surgery.  YAG laser capsulotomy Procedure Note:   Informed consent obtained and correct eye(s) verified with patient.  1 drop of topical Proparacaine and Iopidine instilled, and eye(s) dilated with 1% Tropicamide 2.5% Phenylephrine.  YAG laser applied to posterior capsule in cruciate pattern OD  Patient tolerated procedure well. No complications. Follow up in 1 month/PRN.

## 2020-09-03 ENCOUNTER — PATIENT OUTREACH (OUTPATIENT)
Dept: ADMINISTRATIVE | Facility: OTHER | Age: 70
End: 2020-09-03

## 2020-09-03 ENCOUNTER — OFFICE VISIT (OUTPATIENT)
Dept: PAIN MEDICINE | Facility: CLINIC | Age: 70
End: 2020-09-03
Payer: MEDICARE

## 2020-09-03 VITALS
BODY MASS INDEX: 47.09 KG/M2 | HEART RATE: 74 BPM | WEIGHT: 293 LBS | SYSTOLIC BLOOD PRESSURE: 108 MMHG | DIASTOLIC BLOOD PRESSURE: 66 MMHG | TEMPERATURE: 99 F | HEIGHT: 66 IN

## 2020-09-03 DIAGNOSIS — M25.562 CHRONIC PAIN OF LEFT KNEE: ICD-10-CM

## 2020-09-03 DIAGNOSIS — M17.12 PRIMARY OSTEOARTHRITIS OF LEFT KNEE: ICD-10-CM

## 2020-09-03 DIAGNOSIS — G89.29 CHRONIC PAIN OF LEFT KNEE: ICD-10-CM

## 2020-09-03 DIAGNOSIS — Z51.81 ENCOUNTER FOR MONITORING OPIOID MAINTENANCE THERAPY: ICD-10-CM

## 2020-09-03 DIAGNOSIS — G89.4 CHRONIC PAIN DISORDER: ICD-10-CM

## 2020-09-03 DIAGNOSIS — Z79.891 ENCOUNTER FOR MONITORING OPIOID MAINTENANCE THERAPY: ICD-10-CM

## 2020-09-03 DIAGNOSIS — G89.4 CHRONIC PAIN DISORDER: Primary | ICD-10-CM

## 2020-09-03 PROCEDURE — 1101F PR PT FALLS ASSESS DOC 0-1 FALLS W/OUT INJ PAST YR: ICD-10-PCS | Mod: HCNC,CPTII,S$GLB, | Performed by: NURSE PRACTITIONER

## 2020-09-03 PROCEDURE — 1125F AMNT PAIN NOTED PAIN PRSNT: CPT | Mod: HCNC,S$GLB,, | Performed by: NURSE PRACTITIONER

## 2020-09-03 PROCEDURE — 99213 OFFICE O/P EST LOW 20 MIN: CPT | Mod: HCNC,S$GLB,, | Performed by: NURSE PRACTITIONER

## 2020-09-03 PROCEDURE — 99999 PR PBB SHADOW E&M-EST. PATIENT-LVL V: CPT | Mod: PBBFAC,,, | Performed by: NURSE PRACTITIONER

## 2020-09-03 PROCEDURE — 1159F PR MEDICATION LIST DOCUMENTED IN MEDICAL RECORD: ICD-10-PCS | Mod: HCNC,S$GLB,, | Performed by: NURSE PRACTITIONER

## 2020-09-03 PROCEDURE — 99213 PR OFFICE/OUTPT VISIT, EST, LEVL III, 20-29 MIN: ICD-10-PCS | Mod: HCNC,S$GLB,, | Performed by: NURSE PRACTITIONER

## 2020-09-03 PROCEDURE — 99499 UNLISTED E&M SERVICE: CPT | Mod: S$PBB,HCNC,, | Performed by: NURSE PRACTITIONER

## 2020-09-03 PROCEDURE — 1125F PR PAIN SEVERITY QUANTIFIED, PAIN PRESENT: ICD-10-PCS | Mod: HCNC,S$GLB,, | Performed by: NURSE PRACTITIONER

## 2020-09-03 PROCEDURE — 1101F PT FALLS ASSESS-DOCD LE1/YR: CPT | Mod: HCNC,CPTII,S$GLB, | Performed by: NURSE PRACTITIONER

## 2020-09-03 PROCEDURE — 3074F PR MOST RECENT SYSTOLIC BLOOD PRESSURE < 130 MM HG: ICD-10-PCS | Mod: HCNC,CPTII,S$GLB, | Performed by: NURSE PRACTITIONER

## 2020-09-03 PROCEDURE — 3008F BODY MASS INDEX DOCD: CPT | Mod: HCNC,CPTII,S$GLB, | Performed by: NURSE PRACTITIONER

## 2020-09-03 PROCEDURE — 3008F PR BODY MASS INDEX (BMI) DOCUMENTED: ICD-10-PCS | Mod: HCNC,CPTII,S$GLB, | Performed by: NURSE PRACTITIONER

## 2020-09-03 PROCEDURE — 1159F MED LIST DOCD IN RCRD: CPT | Mod: HCNC,S$GLB,, | Performed by: NURSE PRACTITIONER

## 2020-09-03 PROCEDURE — 99999 PR PBB SHADOW E&M-EST. PATIENT-LVL V: ICD-10-PCS | Mod: PBBFAC,,, | Performed by: NURSE PRACTITIONER

## 2020-09-03 PROCEDURE — 3078F DIAST BP <80 MM HG: CPT | Mod: HCNC,CPTII,S$GLB, | Performed by: NURSE PRACTITIONER

## 2020-09-03 PROCEDURE — 3074F SYST BP LT 130 MM HG: CPT | Mod: HCNC,CPTII,S$GLB, | Performed by: NURSE PRACTITIONER

## 2020-09-03 PROCEDURE — 99499 RISK ADDL DX/OHS AUDIT: ICD-10-PCS | Mod: S$PBB,HCNC,, | Performed by: NURSE PRACTITIONER

## 2020-09-03 PROCEDURE — 3078F PR MOST RECENT DIASTOLIC BLOOD PRESSURE < 80 MM HG: ICD-10-PCS | Mod: HCNC,CPTII,S$GLB, | Performed by: NURSE PRACTITIONER

## 2020-09-03 RX ORDER — OXYCODONE AND ACETAMINOPHEN 5; 325 MG/1; MG/1
1 TABLET ORAL 3 TIMES DAILY PRN
Qty: 90 TABLET | Refills: 0 | Status: SHIPPED | OUTPATIENT
Start: 2020-09-03 | End: 2020-10-01 | Stop reason: SDUPTHER

## 2020-09-03 NOTE — PROGRESS NOTES
"Chronic patient Established Note (Follow up visit)    HPI:     Ca Coats is a 69 y.o. female who presents today with left knee pain. Her pain has been going on for "too long."  She was previously treated by Dr. Iyer at [facility].  She has injections [id number] with him.  These were helpful, but she does not know if the injections were steroid or viscosupplementation.   This pain is described in detail below.     Interval History (4/30/2018):  The patient returns to clinic today for follow up and records review. We did received records from [facility] including a MRI of her knee. Dr. Iyer's last office visit note from January 2017 does not include any previous injections. She continues to report bilateral knee pain, left greater than right. She describes this pain as constant and aching. This pain is worse with prolonged walking. She also report right shoulder pain with prolonged overhead activity. She continues to take Percocet with benefit. She denies any other health changes.     Interval History (5/31/2018):  The patient returns to clinic for follow up. She is s/p left knee Synvisc One injection on 5/8/2018. She reports 35% relief of her knee pain. She continues to report bilateral knee pain, left greater than right. She describes this pain as constant and aching. Her pain is worse with prolonged walking and standing. She is scheduled to see Orthopedics at the  End of this month. She is also following up with Bariatrics to discuss the lap band procedure. She continues to take Percocet with benefit. She also uses Voltaren gel with benefit but this is expensive for her. She denies any other health changes.          Interval History (7/2/2018):  The patient returns to clinic today for follow up. She continues to report left knee pain that is constant, sharp, and aching in nature. Her pain is worse with prolonged walking and standing. She is scheduled for weight loss surgery in August. She continues " "follow up with orthopedics who does not recommend surgery at this time due to her BMI. She continues to take Percocet as needed with benefit. She denies any other health changes.      Interval History (7/19/2018):  The patient returns to clinic today for follow up. She is s/p left genicular nerve block on 7/10/2018. She reports 80% relief of her left knee pain. She reports that she was able to walk for longer distances (3 blocks) after the block. Her pain has returned to baseline. She continues to report left knee pain that is constant, sharp, and aching in nature. Her pain is worse with prolonged walking and standing. She denies any other health changes.      Interval History (8/21/2018):  The patient returns to clinic today for follow up. She is s/p left genicular cooled RFA on 7/31/2018. She reports 60% relief of her left knee pain. She reports intermittent knee pain that is sore and aching in nature. She continues to perform a home exercise routine. She is actively trying to lose weight. She continues to follow up with Bariatric Surgery at [facility]. She is considering weight loss surgery. She continues to use compound cream with benefit. She denies any other health changes.      Interval History (11/21/2018):  The patient returns to clinic today for follow up. She reports increased left knee pain this past week. She reports 2 episodes where her knee has "locked" up on her while walking. She describes her knee pain as sore and aching. She is actively trying to lose weight and quit smoking. She continues to use the compound cream with benefit. She denies any other health changes.      Interval History (1/9/2019):  The patient returns to clinic today for follow up. She is s/p left knee steroid injection on 12/21/2018. She reports one day of relief. She continues to report left knee pain that is constant, sharp, and aching in nature. Her pain is worse with standing and walking. She reports that her knee "locks" up on " her while walking. She often feels as though she is going to fall. She is scheduled for bariatric weight loss surgery in February at [facility]. She denies any other health changes.      Interval History (2/28/2019):  The patient returns to clinic today for follow up. She is s/p Euflexxa series completed on 1/30/2019. She reports that she able to stand for longer periods of time since the procedure. She continues to report left knee pain that is sore and aching in nature. Her pain is worse with prolonged standing and walking. She was previously scheduled for weight loss surgery but reports this was canceled. She is scheduled for follow up next week about this. She denies any other health changes.      Interval History (4/12/2019):  The patient returns for f/u of left knee pain.  She is s/p left genicular cooled RFA with about 90% pain relief.  She has been able to walk easier.  She also notices less stiffness in her knee.  She is planning on gastric surgery prior to knee replacement.  She had benefit with compounding cream but it is no longer covered by her insurance.  She does take Percocet from her PCP.  Her pain today is 7/10.     Interval History (7/11/19):  Patient reported to ED today for increased L knee pain and an episode of LLE weakness that lead to a near fall. Patient is s/p L genicular RFA in 3/19/19 that provided 90% relief for 2 months then came back. Pain is a crunchy pain, in left knee, that does not radiate, worse with movement, better with rest. Pain today is 9/10. She normally uses cane to ambulate but is currently using wheelchair at hospital. Denies any trauma ot the area, fever/chills, n/v, abdominal pain, or HA.     Interval History (8/8/2019):  She returns today for follow up s/p left knee genicular chemodenervation with phenol 7/19/19.  She reports that this procedure did not provide any improvement in her left knee symptoms, and she is still having significant difficulty with ambulation, still  uses wheelchair for mobility. She also reports left lateral thigh and hip pain lateral upper thigh numbness. She has been admitted to [facility] [place] but is not receiving PT there. She is still considering lap band surgery but has missed a followup appointment.      Interval History (9/4/2019):  The patient returns to clinic today for follow up. She continues to report intermittent left lateral thigh and hip pain that is tingling and numb in nature. She continues to report left knee pain. She continues to have difficulty ambulating due to pain. She is currently living in a SNF. She continues to take Gabapentin 300 mg BID. This was increased at last visit but not increased at the nursing home. She does report benefit with Percocet though it only last approximately 4-5 hours. She denies any adverse effects. She does present with a care attendant from the SNF today. Her pain today is 10/10.     Interval History (10/4/2019):  The patient returns to clinic today for follow up and medication refill. She continues to report bilateral knee pain that is aching in nature. She reports intermittent left lateral thigh pain that is tingling and shooting in nature. She reports that her pain has been improving since last visit. She has increased her activity. She continues to report benefit with Gabapentin. She continues to take Percocet as needed with benefit. She denies any other health changes. Her pain today is 0/10.     Interval History (1/6/2020):  The patient returns to clinic today for follow up. She reports increased left leg pain today that is tingling in nature. She continues to report bilateral knee pain, left greater than right. She describes this pain as constant, sharp, and aching. She is no longer living at the nursing facility. She has increased her home exercise routine. She is actively trying to quit smoking in order to move forward with bariatric surgery. She continues to report benefit with current  medication regimen. She takes Gabapentin and Percocet with benefit and without adverse effect. She denies any other health changes. Her pain today is 7/10.     Interval History (4/8/2020):  She returns today for follow up via audio.  She reports that the methocarbamol is not helping.  She continues to have a left-sided low back pain that is bothering her.  Her left knee continues to hurt.  Her right knee is hurting a little as well.  Percocet continues to help, but it is not lasting long enough.     Interval History (5/1/2020):  The patient returns for audio visit today for follow up. She continues to report bilateral knee pain, left greater than right. Her pain is worse with prolonged standing and walking. She reports that her back pain has improved. She continues to report benefit with current medication regimen. She continues to take Gabapentin and Percocet with benefit and without adverse effects. She denies any other health changes. Her pain today is 8/10.     Interval History (6/2/2020):  The patient returns to clinic today via audio visit for follow up of knee pain. She reports increased left knee pain in the last week. Her pain is worse with standing and walking. She feels as though she will fall when standing. She is currently using ice, heat, and OTC lidocaine patches with limited relief. She continues to take Gabapentin and Percocet with some benefit. She denies any adverse effects. She denies any other health changes. Her pain today is 10/10.     Interval History 7/8/2020:  Ca Coats presents to the clinic for a follow-up appointment for follow up after 6/19/20 RFA Since the last visit, Ca Coats states the pain has been persistant. Current pain intensity is 10/10.    Interval History 8/11/2020:  The patient returns to clinic today for follow up of knee pain. She is s/p left knee Orthovisc injection on 7/28/2020. She reports limited relief at this time. She continues to  report left knee pain, especially with standing and walking. She feels as though her knee will buckle. She has recently quit smoking. She is actively trying to lose weight. She was recently started on Ozempic per her PCP. She is following her diet plan. She continues to take Percocet with benefit but reports that it does not last. She denies any adverse effects with this medication. She denies any other health changes. Her pain today is 8/10.    Interval History 9/3/2020:  The patient returns to clinic today for follow up of knee pain. She reports some relief with left knee Orthovisc injection in July. She continues to report left knee pain that is sharp in nature. This pain is worse with standing and walking. She feels as though her knee will give out with prolonged standing. She is actively trying to lose weight but is frustrated with lack of progress. She is following her diet plan. She continues to Percocet with benefit but it does not last. She denies any adverse effects. She denies any other health changes. Her pain today is 7/10.    Pain Disability Index Review:  Last 3 PDI Scores 9/3/2020 8/11/2020 7/8/2020   Pain Disability Index (PDI) 62 56 70         Physical Therapy: Yes, she did this in 2018 for one month (twice a week). She does HEP (stretching and ROM in the morning)      Pain Medications:    Current:  · Gabapentin, tylenol 500mg Q6 prn, Percocet 5/325 mg TID PRN    Opioid Contract: yes     report:  Reviewed and consistent with medication use as prescribed.    Pain Procedures:   · 5/8/2018- Left knee Synvisc One injection  · 7/10/2018- Left genicular nerve block  · 7/31/2018- Left genicular cooled RFA  · 12/21/2018- Left knee steroid injection  · 1/31/2019- Left knee Euflexxa series  · 3/19/2019 Left genicular cooled RFA- 90% relief  · 7/19/19- Left knee genicular phenol chemodenervation-     Physical Therapy/Home Exercise: yes, she did this in 2018 for one month (twice a week). She does HEP  (stretching and ROM in the morning)    Imaging:   MRI Left Knee 7/21/2017 (in media):  The medial meniscus is intact. The lateral meniscus is intact.      A chronic complete ACL tear is noted. The posterior cruciate ligament is intact. The medial and lateral retinacula are intact. The medial collateral ligament is intact. The lateral collateral ligament in intact. The posterolateral corner structures are intact.      There is full thickness fissuring of the central aspect medial femoral cartilage. The lateral tibiofemoral compartment cartilage is intact. There is broad thickness defect within the central trochlear cartilage. There is mild lateral patellofemoral cartilage thinning and regional full thickness loss of the central superior patellar cartilage.      A small effusion is present. There is trace infrapatellar fluid. Tiny popliteal cyst is noted. Subtle focal marrow edema is seen within the posterior tibial plateau. The bone marrow signal is heterogeneous likely reflecting marrow reconversion.      There is trace pes anserine bursitis. The tendons are otherwise normal.      Grade 2 fatty streaking of the semimembranosus and vastus lateralis muscles are noted. There is mild prepatellar edema.      MRI Right Shoulder 7/21/2017 (in media):   There is a complete tear of the supraspinatus tendon with retraction to the glenohumeral joint. There is partial tearing of the anterior infraspinatus articular surface. Mild subscapularis tendinosis is noted. The teres minor is intact. A small amount of subacromial/subdeltoid fluid is noted. The proximal long head biceps tendon is poorly visualized proximally although intact distally.      Degenerative tearing of the anterosuperior through posterosuperior labrum is noted. There is moderate to severe superior glenoid cartilage thinning with subchondral degenerative changes. The glenohumeral ligaments appear grossly intact.      An os acromiale is seen with fluid and  osteophytosis at its junction with the base of the acromion. There is moderate superior subluxation of the AC joint with mild osteophytosis.      Small effusion with subcoracoid extension is noted.      There is grade 2/3 fatty atrophy of the supraspinatus muscle, grade 2 of the infraspinatus and subscapularis. Heterogeneous signal is seen throughout the bone marrow likely reflecting marrow reconversion.        Allergies: Review of patient's allergies indicates:  No Known Allergies    Current Medications:   Current Outpatient Medications   Medication Sig Dispense Refill    acetaminophen (TYLENOL) 500 MG tablet       albuterol (PROAIR HFA) 90 mcg/actuation inhaler Inhale 2 puffs into the lungs every 6 (six) hours as needed for Wheezing. Rescue 18 g 2    amLODIPine (NORVASC) 10 MG tablet TAKE 1 TABLET EVERY DAY 90 tablet 1    atorvastatin (LIPITOR) 40 MG tablet Take 1 tablet (40 mg total) by mouth once daily. 90 tablet 3    B-complex with vitamin C (Z-BEC OR EQUIV) tablet Take 1 tablet by mouth once daily.       CALCIUM CITRATE-VITAMIN D2 ORAL Take 1 tablet by mouth once daily.       diclofenac sodium (VOLTAREN) 1 % Gel Voltaren 1 % topical gel      ferrous gluconate (FERGON) 324 MG tablet Take 1 tablet by mouth once daily.      ferrous gluconate 324 mg (37.5 mg iron) Tab tablet Take 1 tablet (324 mg total) by mouth daily with breakfast. 90 tablet 4    gabapentin (NEURONTIN) 300 MG capsule Take 2 capsules (600 mg total) by mouth 3 (three) times daily. 540 capsule 0    lisinopriL (PRINIVIL,ZESTRIL) 40 MG tablet TAKE 1 TABLET EVERY DAY 90 tablet 1    multivitamin with minerals tablet Take 1 tablet by mouth once daily.       omega 3-dha-epa-fish oil 1,000 mg (120 mg-180 mg) Cap       oxyCODONE-acetaminophen (PERCOCET) 5-325 mg per tablet Take 1 tablet by mouth 3 (three) times daily as needed for Pain. 90 tablet 0    pantoprazole (PROTONIX) 40 MG tablet Take 1 tablet (40 mg total) by mouth once daily. 90  tablet 1    semaglutide (OZEMPIC) 0.25 mg or 0.5 mg(2 mg/1.5 mL) PnIj Inject 0.25 mg into the skin every 7 days. 1.5 mL 0     Current Facility-Administered Medications   Medication Dose Route Frequency Provider Last Rate Last Dose    sodium hyaluronate (EUFLEXXA) 10 mg/mL(mw 2.4 -3.6 million) injection 20 mg  20 mg Intra-articular 1 time in Clinic/FREIDA Clark MD        sodium hyaluronate (EUFLEXXA) 10 mg/mL(mw 2.4 -3.6 million) injection 20 mg  20 mg Intra-articular 1 time in [facility]/FREIDA Clark MD        sodium hyaluronate (EUFLEXXA) 10 mg/mL(mw 2.4 -3.6 million) injection 20 mg  20 mg Intra-articular 1 time in [facility]/FREIDA Clark MD        sodium hyaluronate (EUFLEXXA) 10 mg/mL(mw 2.4 -3.6 million) injection 20 mg  20 mg Intra-articular 1 time in [facility]/FREIDA Clark MD           REVIEW OF SYSTEMS:    GENERAL:  No weight loss, malaise or fevers.  HEENT:  Negative for frequent or significant headaches.  NECK:  Negative for lumps, goiter, pain and significant neck swelling.  RESPIRATORY:  Negative for cough, wheezing or shortness of breath. +LAURA  CARDIOVASCULAR:  Negative for chest pain, leg swelling or palpitations.  GI:  Negative for abdominal discomfort, blood in stools or black stools or change in bowel habits. +GERD  MUSCULOSKELETAL:  + Left knee pain  SKIN:  Negative for lesions, rash, and itching.  PSYCH:  Positive for  mood disorder and recent psychosocial stressors. +sleep disturbance  HEMATOLOGY/LYMPHOLOGY:  Negative for prolonged bleeding, bruising easily or swollen nodes.  NEURO:   No history of headaches, syncope, paralysis, seizures or tremors.  All other reviewed and negative other than HPI.    Past Medical History:  Past Medical History:   Diagnosis Date    Anemia     2018    Arthritis     BMI 60.0-69.9, adult     Cataract     Depression     Dry eye syndrome     GERD (gastroesophageal reflux disease)     Glaucoma suspect     History of gastric ulcer      Hyperlipidemia     Hypertension     Hypothyroidism     Morbid obesity     Neuromuscular disorder     Thyroid disease        Past Surgical History:  Past Surgical History:   Procedure Laterality Date    APPENDECTOMY      CATARACT EXTRACTION W/  INTRAOCULAR LENS IMPLANT Bilateral     MFIOL OU    INJECTION OF ANESTHETIC AGENT AROUND NERVE Left 7/10/2018    Procedure: BLOCK, NERVE;  Surgeon: Samantha Vidal MD;  Location: [facility];  Service: Pain Management;  Laterality: Left;  Genicular     INJECTION OF ANESTHETIC AGENT AROUND NERVE Left 2019    Procedure: Block, Nerve NERVE BLOCK GENICULAR WITH PHENOL 6%;  Surgeon: Samantha Vidal MD;  Location: [facility];  Service: Pain Management;  Laterality: Left;  NEEDS CONSENT    RADIOFREQUENCY ABLATION Left 2020    Procedure: RADIOFREQUENCY ABLATION LEFT GENICULAR;  Surgeon: Tevin Clark MD;  Location: [facility];  Service: Pain Management;  Laterality: Left;  Left Genicular RFA       Family History:  Family History   Problem Relation Age of Onset    No Known Problems Mother     No Known Problems Father        Social History:  Social History     Socioeconomic History    Marital status: Single     Spouse name: Not on file    Number of children: 2    Years of education: Not on file    Highest education level: Not on file   Occupational History     Comment: retired  and [employer]   Social Needs    Financial resource strain: Not on file    Food insecurity     Worry: Not on file     Inability: Not on file    Transportation needs     Medical: Not on file     Non-medical: Not on file   Tobacco Use    Smoking status: Former Smoker     Packs/day: 0.25     Years: 50.00     Pack years: 12.50     Types: Cigarettes     Quit date: 2020     Years since quittin.6    Smokeless tobacco: Never Used   Substance and Sexual Activity    Alcohol use: Not Currently    Drug use: No    Sexual activity: Not Currently     Partners: Male  "  Lifestyle    Physical activity     Days per week: Not on file     Minutes per session: Not on file    Stress: Not on file   Relationships    Social connections     Talks on phone: Not on file     Gets together: Not on file     Attends Jainism service: Not on file     Active member of club or organization: Not on file     Attends meetings of clubs or organizations: Not on file     Relationship status: Not on file   Other Topics Concern    Not on file   Social History Narrative    Not on file       OBJECTIVE:    /66   Pulse 74   Temp 98.9 °F (37.2 °C)   Ht 5' 6" (1.676 m)   Wt (!) 175.1 kg (386 lb 0.4 oz)   BMI 62.31 kg/m²     PHYSICAL EXAMINATION:    General appearance: Well appearing, in no acute distress, alert and oriented x3.  Psych:  Mood and affect appropriate.  Skin: Skin color, texture, turgor normal, no rashes or lesions, in both upper and lower body.  Head/face:  Atraumatic, normocephalic. No palpable lymph nodes  Cor: RRR  Pulm: CTA  GI: Abdomen soft and non-tender.  Extremities: Peripheral joint ROM is full and pain free without obvious instability or laxity in all four extremities, except for limited ROM of the left knee. No deformities, edema, or skin discoloration. Good capillary refill.  Musculoskeletal: There is pain with palpation over medial and lateral joint line of left knee. Limited ROM with pain on flexion and extension of left knee. Crepitus noted to left knee. Shoulder, hip, and sacroiliac provocative maneuvers are negative. Bilateral upper and lower extremity strength is normal and symmetric.  No atrophy or tone abnormalities are noted.  Neuro: Bilateral upper and lower extremity coordination and muscle stretch reflexes are physiologic and symmetric.  Plantar response are downgoing. No loss of sensation is noted.  Gait: Antalgic- ambulates with wheelchair.     ASSESSMENT: 69 y.o. year old female with left knee pain, consistent with the followin. Chronic pain " disorder     2. Chronic pain of left knee     3. Primary osteoarthritis of left knee     4. Encounter for monitoring opioid maintenance therapy           PLAN:     - Previous imaging was reviewed and discussed with the patient today.    - She is s/p left knee Orthovisc injection some relief, we can repeat this as needed.     - She is s/p left genicular RFA with limited relief.     - Continue follow up with Bariatrics regarding weight loss.     - Pain contract signed 8/11/2020.    - Continue Percocet 5/325 mg TID PRN. Refill provided with appropriate date.     - The patient is here today for a refill of current pain medications and they believe these provide effective pain control and improvements in quality of life.  The patient notes no serious side effects, and feels the benefits outweigh the risks.  The patient was reminded of the pain contract that they signed previously as well as the risks and benefits of the medication including possible death.  The updated [place] Board of [employer] prescription monitoring program was reviewed, and the patient has been found to be compliant with current treatment plan.    - I have stressed the importance of physical activity and a home exercise plan to help with pain and improve health.    - RTC in 3 months. She may call for refills.     - Dr. Clark was consulted on the patient and agrees with this plan.    The above plan and management options were discussed at length with patient. Patient is in agreement with the above and verbalized understanding.    Savanna Hyatt NP  09/03/2020

## 2020-09-08 ENCOUNTER — OFFICE VISIT (OUTPATIENT)
Dept: BARIATRICS | Facility: CLINIC | Age: 70
End: 2020-09-08
Payer: MEDICARE

## 2020-09-08 VITALS
DIASTOLIC BLOOD PRESSURE: 64 MMHG | OXYGEN SATURATION: 99 % | WEIGHT: 293 LBS | BODY MASS INDEX: 62.31 KG/M2 | HEART RATE: 61 BPM | SYSTOLIC BLOOD PRESSURE: 112 MMHG

## 2020-09-08 DIAGNOSIS — E66.01 CLASS 3 SEVERE OBESITY DUE TO EXCESS CALORIES WITH SERIOUS COMORBIDITY AND BODY MASS INDEX (BMI) OF 60.0 TO 69.9 IN ADULT: Primary | ICD-10-CM

## 2020-09-08 PROCEDURE — 3074F PR MOST RECENT SYSTOLIC BLOOD PRESSURE < 130 MM HG: ICD-10-PCS | Mod: HCNC,CPTII,S$GLB, | Performed by: STUDENT IN AN ORGANIZED HEALTH CARE EDUCATION/TRAINING PROGRAM

## 2020-09-08 PROCEDURE — 3008F BODY MASS INDEX DOCD: CPT | Mod: HCNC,CPTII,S$GLB, | Performed by: STUDENT IN AN ORGANIZED HEALTH CARE EDUCATION/TRAINING PROGRAM

## 2020-09-08 PROCEDURE — 1159F MED LIST DOCD IN RCRD: CPT | Mod: HCNC,S$GLB,, | Performed by: STUDENT IN AN ORGANIZED HEALTH CARE EDUCATION/TRAINING PROGRAM

## 2020-09-08 PROCEDURE — 1126F AMNT PAIN NOTED NONE PRSNT: CPT | Mod: HCNC,S$GLB,, | Performed by: STUDENT IN AN ORGANIZED HEALTH CARE EDUCATION/TRAINING PROGRAM

## 2020-09-08 PROCEDURE — 99213 PR OFFICE/OUTPT VISIT, EST, LEVL III, 20-29 MIN: ICD-10-PCS | Mod: HCNC,S$GLB,, | Performed by: STUDENT IN AN ORGANIZED HEALTH CARE EDUCATION/TRAINING PROGRAM

## 2020-09-08 PROCEDURE — 1101F PR PT FALLS ASSESS DOC 0-1 FALLS W/OUT INJ PAST YR: ICD-10-PCS | Mod: HCNC,CPTII,S$GLB, | Performed by: STUDENT IN AN ORGANIZED HEALTH CARE EDUCATION/TRAINING PROGRAM

## 2020-09-08 PROCEDURE — 3078F DIAST BP <80 MM HG: CPT | Mod: HCNC,CPTII,S$GLB, | Performed by: STUDENT IN AN ORGANIZED HEALTH CARE EDUCATION/TRAINING PROGRAM

## 2020-09-08 PROCEDURE — 1101F PT FALLS ASSESS-DOCD LE1/YR: CPT | Mod: HCNC,CPTII,S$GLB, | Performed by: STUDENT IN AN ORGANIZED HEALTH CARE EDUCATION/TRAINING PROGRAM

## 2020-09-08 PROCEDURE — 3074F SYST BP LT 130 MM HG: CPT | Mod: HCNC,CPTII,S$GLB, | Performed by: STUDENT IN AN ORGANIZED HEALTH CARE EDUCATION/TRAINING PROGRAM

## 2020-09-08 PROCEDURE — 1126F PR PAIN SEVERITY QUANTIFIED, NO PAIN PRESENT: ICD-10-PCS | Mod: HCNC,S$GLB,, | Performed by: STUDENT IN AN ORGANIZED HEALTH CARE EDUCATION/TRAINING PROGRAM

## 2020-09-08 PROCEDURE — 3078F PR MOST RECENT DIASTOLIC BLOOD PRESSURE < 80 MM HG: ICD-10-PCS | Mod: HCNC,CPTII,S$GLB, | Performed by: STUDENT IN AN ORGANIZED HEALTH CARE EDUCATION/TRAINING PROGRAM

## 2020-09-08 PROCEDURE — 3008F PR BODY MASS INDEX (BMI) DOCUMENTED: ICD-10-PCS | Mod: HCNC,CPTII,S$GLB, | Performed by: STUDENT IN AN ORGANIZED HEALTH CARE EDUCATION/TRAINING PROGRAM

## 2020-09-08 PROCEDURE — 99999 PR PBB SHADOW E&M-EST. PATIENT-LVL V: CPT | Mod: PBBFAC,HCNC,, | Performed by: STUDENT IN AN ORGANIZED HEALTH CARE EDUCATION/TRAINING PROGRAM

## 2020-09-08 PROCEDURE — 99999 PR PBB SHADOW E&M-EST. PATIENT-LVL V: ICD-10-PCS | Mod: PBBFAC,HCNC,, | Performed by: STUDENT IN AN ORGANIZED HEALTH CARE EDUCATION/TRAINING PROGRAM

## 2020-09-08 PROCEDURE — 99213 OFFICE O/P EST LOW 20 MIN: CPT | Mod: HCNC,S$GLB,, | Performed by: STUDENT IN AN ORGANIZED HEALTH CARE EDUCATION/TRAINING PROGRAM

## 2020-09-08 PROCEDURE — 1159F PR MEDICATION LIST DOCUMENTED IN MEDICAL RECORD: ICD-10-PCS | Mod: HCNC,S$GLB,, | Performed by: STUDENT IN AN ORGANIZED HEALTH CARE EDUCATION/TRAINING PROGRAM

## 2020-09-08 RX ORDER — SEMAGLUTIDE 1.34 MG/ML
0.5 INJECTION, SOLUTION SUBCUTANEOUS
Qty: 1.5 ML | Refills: 0 | Status: SHIPPED | OUTPATIENT
Start: 2020-09-08 | End: 2020-09-28

## 2020-09-08 NOTE — PROGRESS NOTES
Subjective:       Patient ID: Ca Coats is a 69 y.o. female.    Chief Complaint: weight gain    Patient presents for follow-up. Has questions about planning of bariatric surgery. She has quit smoking, but still not at goal weight for surgery.    She was started on Ozempic last month and denies any side effects.     Diet Recall:  B: yogurt with fruit  D: chicken, fish    Co-morbidities associated with obesity:   HTN on Norvasc and lisinopril  HLD on Lipitor and fish oil  GERD on Protonix  LAURA on CPAP  Osteoarthritis, DJD     Weight Loss History (patient unable to use InBody):  7/7/2020: 394 lbs, BMI 63.73; started metformin 500 mg twice daily  8/5/2020: 393 lbs, BMI 63.52; discontinue metformin, start Ozempic 0.25 mg weekly injection  9/8/2020: 386 lbs, BMI 62.31; Ozempic 0.5 mg weekly injection    Review of Systems   Constitutional: Negative for chills and fever.   HENT: Negative for sore throat and trouble swallowing.    Eyes: Negative for visual disturbance.   Respiratory: Positive for shortness of breath.    Cardiovascular: Negative for chest pain and palpitations.   Gastrointestinal: Positive for reflux. Negative for abdominal pain, nausea and vomiting.   Musculoskeletal: Positive for arthralgias and back pain.   Integumentary:  Negative for rash.   Neurological: Negative for dizziness and light-headedness.   Psychiatric/Behavioral: The patient is not nervous/anxious.          Objective:       Unable to stand on InBody  Weight: 386 lbs  BMI 62.31    Vitals:    09/08/20 1110   BP: 112/64   Pulse: 61       Physical Exam  Vitals signs reviewed.   Constitutional:       General: She is not in acute distress.     Appearance: Normal appearance. She is obese. She is not ill-appearing, toxic-appearing or diaphoretic.   HENT:      Head: Normocephalic and atraumatic.   Eyes:      Extraocular Movements: Extraocular movements intact.   Neck:      Musculoskeletal: Normal range of motion.   Cardiovascular:       Rate and Rhythm: Normal rate.   Pulmonary:      Effort: Pulmonary effort is normal. No respiratory distress.   Musculoskeletal:      Comments: In wheelchair   Skin:     General: Skin is warm and dry.   Neurological:      General: No focal deficit present.      Mental Status: She is alert and oriented to person, place, and time.      Gait: Gait normal.   Psychiatric:         Mood and Affect: Mood normal.         Behavior: Behavior normal.         Thought Content: Thought content normal.         Judgment: Judgment normal.         Assessment:       1. Class 3 severe obesity due to excess calories with serious comorbidity and body mass index (BMI) of 60.0 to 69.9 in adult        Plan:   - Ozempic 0.5 mg weekly injections    - Follow-up appointment made with Edel Nix     - Log all food and beverage intake with a daily calorie goal of 1500 calories     - Return to clinic in 4 weeks

## 2020-09-08 NOTE — PATIENT INSTRUCTIONS
Increase dose of Ozempic 0.5 mg weekly injections.  If you get pain across the upper abdomen and around to your back, please call the office.

## 2020-09-10 PROBLEM — E78.2 MIXED HYPERLIPIDEMIA: Chronic | Status: ACTIVE | Noted: 2019-04-29

## 2020-09-10 PROBLEM — E07.9 THYROID DISEASE: Status: RESOLVED | Noted: 2019-07-18 | Resolved: 2020-09-10

## 2020-09-10 PROBLEM — D64.9 NORMOCYTIC ANEMIA: Status: RESOLVED | Noted: 2020-03-22 | Resolved: 2020-09-10

## 2020-09-10 NOTE — PROGRESS NOTES
Subjective:       Patient ID: Ca Coats is a 69 y.o. female.    Chief Complaint: Hypertension    Pt here for f/u as her PCP is unavailable. Pt said she had c-scope about 3 years ago and was normal and told 10 yrs for the next; record will be obtained.    Pt's BP is well controlled. Tolerating meds well. Pt denies cp/sob/ha/vision or neuro changes. Checking at home and is well controlled.     HLD is tx with lipitor which she tolerates well.     She has mild osteopenia. Reiterated importance of ca/d and resistance exercises.     She has LAURA and uses cpap with success.     She is seeing bariatric med team for wt loss. Having some success with current tx plan which includes ozempic.    She has chronic L knee pain for which she has seen ortho and had injections with mixed relief. She is also seeing pain mgmt who has done genicular RFA with some benefit. She still requires percocet which is rx by pain mgmt. She is working with bariatrics as above to work on wt loss.      GERD is well controlled on protonix. She has not tolerated getting off this but not having any red flag symptoms.      She c/o mass on L posterior scalp that has been present for months. Sometimes gets bigger and becomes painful. Never any drainage but cannot see area to tell if there is any redness.     Review of Systems   Constitutional: Negative for fatigue and unexpected weight change.   Eyes: Negative for visual disturbance.   Respiratory: Negative for shortness of breath.    Cardiovascular: Negative for chest pain.   Neurological: Negative for numbness and headaches.         Objective:      Physical Exam  Constitutional:       Appearance: She is well-developed.   HENT:      Head: Normocephalic and atraumatic.      Comments: L posterior scalp with soft, mobile mass about 3cm; no erythema or fluctuance  Eyes:      Pupils: Pupils are equal, round, and reactive to light.   Neck:      Thyroid: No thyroid mass or thyromegaly.      Vascular:  No carotid bruit.   Cardiovascular:      Rate and Rhythm: Normal rate and regular rhythm.      Heart sounds: Normal heart sounds, S1 normal and S2 normal. No murmur.   Pulmonary:      Effort: Pulmonary effort is normal.      Breath sounds: Normal breath sounds. No wheezing.   Abdominal:      General: Bowel sounds are normal. There is no distension.      Palpations: Abdomen is soft. There is no mass.      Tenderness: There is no abdominal tenderness.   Lymphadenopathy:      Cervical: No cervical adenopathy.   Neurological:      Mental Status: She is alert and oriented to person, place, and time.      Cranial Nerves: No cranial nerve deficit.   Psychiatric:         Judgment: Judgment normal.         Assessment:       1. Essential hypertension    2. Mixed hyperlipidemia    3. Primary osteoarthritis of left knee    4. Class 3 severe obesity due to excess calories with serious comorbidity and body mass index (BMI) of 60.0 to 69.9 in adult    5. Obstructive sleep apnea    6. Osteopenia of neck of left femur    7. Gastroesophageal reflux disease without esophagitis    8. Chronic narcotic dependence    9. Scalp mass        Plan:       1. Recent labs reviewed  2. Home BP monitoring  3. Keep f/u with specialists  4. gen surg referral re: probable lipoma

## 2020-09-11 ENCOUNTER — OFFICE VISIT (OUTPATIENT)
Dept: INTERNAL MEDICINE | Facility: CLINIC | Age: 70
End: 2020-09-11
Payer: MEDICARE

## 2020-09-11 VITALS
DIASTOLIC BLOOD PRESSURE: 65 MMHG | HEIGHT: 66 IN | BODY MASS INDEX: 47.09 KG/M2 | SYSTOLIC BLOOD PRESSURE: 114 MMHG | HEART RATE: 71 BPM | WEIGHT: 293 LBS | OXYGEN SATURATION: 96 %

## 2020-09-11 DIAGNOSIS — M85.852 OSTEOPENIA OF NECK OF LEFT FEMUR: ICD-10-CM

## 2020-09-11 DIAGNOSIS — M17.12 PRIMARY OSTEOARTHRITIS OF LEFT KNEE: ICD-10-CM

## 2020-09-11 DIAGNOSIS — F11.20 CHRONIC NARCOTIC DEPENDENCE: ICD-10-CM

## 2020-09-11 DIAGNOSIS — K21.9 GASTROESOPHAGEAL REFLUX DISEASE WITHOUT ESOPHAGITIS: ICD-10-CM

## 2020-09-11 DIAGNOSIS — E78.2 MIXED HYPERLIPIDEMIA: Chronic | ICD-10-CM

## 2020-09-11 DIAGNOSIS — R22.0 SCALP MASS: ICD-10-CM

## 2020-09-11 DIAGNOSIS — E66.01 CLASS 3 SEVERE OBESITY DUE TO EXCESS CALORIES WITH SERIOUS COMORBIDITY AND BODY MASS INDEX (BMI) OF 60.0 TO 69.9 IN ADULT: ICD-10-CM

## 2020-09-11 DIAGNOSIS — I10 ESSENTIAL HYPERTENSION: Primary | ICD-10-CM

## 2020-09-11 DIAGNOSIS — G47.33 OBSTRUCTIVE SLEEP APNEA: ICD-10-CM

## 2020-09-11 PROCEDURE — 99999 PR PBB SHADOW E&M-EST. PATIENT-LVL III: ICD-10-PCS | Mod: PBBFAC,HCNC,, | Performed by: INTERNAL MEDICINE

## 2020-09-11 PROCEDURE — 3074F SYST BP LT 130 MM HG: CPT | Mod: HCNC,CPTII,S$GLB, | Performed by: INTERNAL MEDICINE

## 2020-09-11 PROCEDURE — 99499 UNLISTED E&M SERVICE: CPT | Mod: S$PBB,HCNC,, | Performed by: INTERNAL MEDICINE

## 2020-09-11 PROCEDURE — 3074F PR MOST RECENT SYSTOLIC BLOOD PRESSURE < 130 MM HG: ICD-10-PCS | Mod: HCNC,CPTII,S$GLB, | Performed by: INTERNAL MEDICINE

## 2020-09-11 PROCEDURE — 99499 RISK ADDL DX/OHS AUDIT: ICD-10-PCS | Mod: S$PBB,HCNC,, | Performed by: INTERNAL MEDICINE

## 2020-09-11 PROCEDURE — 99999 PR PBB SHADOW E&M-EST. PATIENT-LVL III: CPT | Mod: PBBFAC,HCNC,, | Performed by: INTERNAL MEDICINE

## 2020-09-11 PROCEDURE — 1101F PT FALLS ASSESS-DOCD LE1/YR: CPT | Mod: HCNC,CPTII,S$GLB, | Performed by: INTERNAL MEDICINE

## 2020-09-11 PROCEDURE — 1126F PR PAIN SEVERITY QUANTIFIED, NO PAIN PRESENT: ICD-10-PCS | Mod: HCNC,S$GLB,, | Performed by: INTERNAL MEDICINE

## 2020-09-11 PROCEDURE — 1101F PR PT FALLS ASSESS DOC 0-1 FALLS W/OUT INJ PAST YR: ICD-10-PCS | Mod: HCNC,CPTII,S$GLB, | Performed by: INTERNAL MEDICINE

## 2020-09-11 PROCEDURE — 99214 PR OFFICE/OUTPT VISIT, EST, LEVL IV, 30-39 MIN: ICD-10-PCS | Mod: HCNC,S$GLB,, | Performed by: INTERNAL MEDICINE

## 2020-09-11 PROCEDURE — 1126F AMNT PAIN NOTED NONE PRSNT: CPT | Mod: HCNC,S$GLB,, | Performed by: INTERNAL MEDICINE

## 2020-09-11 PROCEDURE — 3078F DIAST BP <80 MM HG: CPT | Mod: HCNC,CPTII,S$GLB, | Performed by: INTERNAL MEDICINE

## 2020-09-11 PROCEDURE — 3008F BODY MASS INDEX DOCD: CPT | Mod: HCNC,CPTII,S$GLB, | Performed by: INTERNAL MEDICINE

## 2020-09-11 PROCEDURE — 3008F PR BODY MASS INDEX (BMI) DOCUMENTED: ICD-10-PCS | Mod: HCNC,CPTII,S$GLB, | Performed by: INTERNAL MEDICINE

## 2020-09-11 PROCEDURE — 99214 OFFICE O/P EST MOD 30 MIN: CPT | Mod: HCNC,S$GLB,, | Performed by: INTERNAL MEDICINE

## 2020-09-11 PROCEDURE — 1159F PR MEDICATION LIST DOCUMENTED IN MEDICAL RECORD: ICD-10-PCS | Mod: HCNC,S$GLB,, | Performed by: INTERNAL MEDICINE

## 2020-09-11 PROCEDURE — 1159F MED LIST DOCD IN RCRD: CPT | Mod: HCNC,S$GLB,, | Performed by: INTERNAL MEDICINE

## 2020-09-11 PROCEDURE — 3078F PR MOST RECENT DIASTOLIC BLOOD PRESSURE < 80 MM HG: ICD-10-PCS | Mod: HCNC,CPTII,S$GLB, | Performed by: INTERNAL MEDICINE

## 2020-09-21 DIAGNOSIS — M17.12 PRIMARY OSTEOARTHRITIS OF LEFT KNEE: ICD-10-CM

## 2020-09-21 DIAGNOSIS — M25.562 CHRONIC PAIN OF LEFT KNEE: ICD-10-CM

## 2020-09-21 DIAGNOSIS — G89.4 CHRONIC PAIN DISORDER: ICD-10-CM

## 2020-09-21 DIAGNOSIS — G89.29 CHRONIC PAIN OF LEFT KNEE: ICD-10-CM

## 2020-09-21 RX ORDER — GABAPENTIN 300 MG/1
600 CAPSULE ORAL 3 TIMES DAILY
Qty: 540 CAPSULE | Refills: 1 | Status: SHIPPED | OUTPATIENT
Start: 2020-09-21 | End: 2021-02-26 | Stop reason: SDUPTHER

## 2020-09-21 NOTE — TELEPHONE ENCOUNTER
----- Message from Ashley Araiza sent at 9/21/2020 12:21 PM CDT -----  Contact: LU RENAE [[id number]]      Can the clinic reply in [facility]: no      Please refill the medication(s) listed below. Please call the patient when the prescription(s) is ready for  at this phone number  [phone] (home)         Medication #1 gabapentin (NEURONTIN) 300 MG capsule        Preferred Pharmacy:    [facility] - [place], [place] - [address]  [address] [address]  Phone: [phone] Fax: [phone]

## 2020-10-01 DIAGNOSIS — M25.562 CHRONIC PAIN OF LEFT KNEE: ICD-10-CM

## 2020-10-01 DIAGNOSIS — G89.4 CHRONIC PAIN DISORDER: ICD-10-CM

## 2020-10-01 DIAGNOSIS — M17.12 PRIMARY OSTEOARTHRITIS OF LEFT KNEE: ICD-10-CM

## 2020-10-01 DIAGNOSIS — G89.29 CHRONIC PAIN OF LEFT KNEE: ICD-10-CM

## 2020-10-01 RX ORDER — OXYCODONE AND ACETAMINOPHEN 5; 325 MG/1; MG/1
1 TABLET ORAL 3 TIMES DAILY PRN
Qty: 90 TABLET | Refills: 0 | Status: SHIPPED | OUTPATIENT
Start: 2020-10-01 | End: 2020-10-31

## 2020-10-01 NOTE — TELEPHONE ENCOUNTER
----- Message from Kallie Pickens, Patient Care Assistant sent at 10/1/2020  9:49 AM CDT -----  Can the clinic reply in [facility]: No    Please refill the medication(s) listed below. The patient can be reached at this phone number once it is called into the pharmacy.[phone]    Medication #1oxyCODONE-acetaminophen (PERCOCET) 5-325 mg per tablet    Medication #2    Preferred Pharmacy: [facility] [address] [place] [address]

## 2020-10-01 NOTE — TELEPHONE ENCOUNTER
Patient requesting refill on 10/01/2020    Last Office Visit 09/03/2020     Shows Last Refill on 09/03/2020    Patient does have a pain contract on file with [facility].    Patient last UDS 10/04/2019 was consistent with current therapy.      Please advise.  Thanks

## 2020-10-06 ENCOUNTER — PATIENT OUTREACH (OUTPATIENT)
Dept: ADMINISTRATIVE | Facility: OTHER | Age: 70
End: 2020-10-06

## 2020-10-06 ENCOUNTER — OFFICE VISIT (OUTPATIENT)
Dept: BARIATRICS | Facility: CLINIC | Age: 70
End: 2020-10-06
Payer: MEDICARE

## 2020-10-06 ENCOUNTER — CLINICAL SUPPORT (OUTPATIENT)
Dept: BARIATRICS | Facility: CLINIC | Age: 70
End: 2020-10-06
Payer: MEDICARE

## 2020-10-06 ENCOUNTER — OFFICE VISIT (OUTPATIENT)
Dept: SURGERY | Facility: CLINIC | Age: 70
End: 2020-10-06
Payer: MEDICARE

## 2020-10-06 VITALS
OXYGEN SATURATION: 95 % | DIASTOLIC BLOOD PRESSURE: 72 MMHG | HEART RATE: 82 BPM | WEIGHT: 293 LBS | TEMPERATURE: 98 F | BODY MASS INDEX: 61.17 KG/M2 | SYSTOLIC BLOOD PRESSURE: 118 MMHG

## 2020-10-06 VITALS
DIASTOLIC BLOOD PRESSURE: 72 MMHG | RESPIRATION RATE: 95 BRPM | BODY MASS INDEX: 61.2 KG/M2 | HEART RATE: 82 BPM | SYSTOLIC BLOOD PRESSURE: 118 MMHG | WEIGHT: 293 LBS

## 2020-10-06 DIAGNOSIS — Z71.3 DIETARY COUNSELING: ICD-10-CM

## 2020-10-06 DIAGNOSIS — G47.33 OBSTRUCTIVE SLEEP APNEA: ICD-10-CM

## 2020-10-06 DIAGNOSIS — E66.01 CLASS 3 SEVERE OBESITY DUE TO EXCESS CALORIES WITH SERIOUS COMORBIDITY AND BODY MASS INDEX (BMI) OF 60.0 TO 69.9 IN ADULT: Primary | ICD-10-CM

## 2020-10-06 DIAGNOSIS — K21.9 GASTROESOPHAGEAL REFLUX DISEASE WITHOUT ESOPHAGITIS: ICD-10-CM

## 2020-10-06 DIAGNOSIS — R22.0 SCALP MASS: ICD-10-CM

## 2020-10-06 DIAGNOSIS — I10 ESSENTIAL HYPERTENSION: ICD-10-CM

## 2020-10-06 DIAGNOSIS — E78.2 MIXED HYPERLIPIDEMIA: Chronic | ICD-10-CM

## 2020-10-06 DIAGNOSIS — E66.01 MORBID OBESITY WITH BMI OF 60.0-69.9, ADULT: ICD-10-CM

## 2020-10-06 PROCEDURE — 1101F PR PT FALLS ASSESS DOC 0-1 FALLS W/OUT INJ PAST YR: ICD-10-PCS | Mod: HCNC,CPTII,S$GLB, | Performed by: SURGERY

## 2020-10-06 PROCEDURE — 99499 UNLISTED E&M SERVICE: CPT | Mod: HCNC,S$GLB,, | Performed by: DIETITIAN, REGISTERED

## 2020-10-06 PROCEDURE — 99999 PR PBB SHADOW E&M-EST. PATIENT-LVL IV: CPT | Mod: PBBFAC,HCNC,, | Performed by: STUDENT IN AN ORGANIZED HEALTH CARE EDUCATION/TRAINING PROGRAM

## 2020-10-06 PROCEDURE — 1126F PR PAIN SEVERITY QUANTIFIED, NO PAIN PRESENT: ICD-10-PCS | Mod: HCNC,S$GLB,, | Performed by: SURGERY

## 2020-10-06 PROCEDURE — 1101F PT FALLS ASSESS-DOCD LE1/YR: CPT | Mod: HCNC,CPTII,S$GLB, | Performed by: STUDENT IN AN ORGANIZED HEALTH CARE EDUCATION/TRAINING PROGRAM

## 2020-10-06 PROCEDURE — 99999 PR PBB SHADOW E&M-EST. PATIENT-LVL IV: ICD-10-PCS | Mod: PBBFAC,HCNC,, | Performed by: STUDENT IN AN ORGANIZED HEALTH CARE EDUCATION/TRAINING PROGRAM

## 2020-10-06 PROCEDURE — 1101F PR PT FALLS ASSESS DOC 0-1 FALLS W/OUT INJ PAST YR: ICD-10-PCS | Mod: HCNC,CPTII,S$GLB, | Performed by: STUDENT IN AN ORGANIZED HEALTH CARE EDUCATION/TRAINING PROGRAM

## 2020-10-06 PROCEDURE — 97803 PR MED NUTR THER, SUBSQ, INDIV, EA 15 MIN: ICD-10-PCS | Mod: HCNC,S$GLB,, | Performed by: DIETITIAN, REGISTERED

## 2020-10-06 PROCEDURE — 3078F PR MOST RECENT DIASTOLIC BLOOD PRESSURE < 80 MM HG: ICD-10-PCS | Mod: HCNC,CPTII,S$GLB, | Performed by: SURGERY

## 2020-10-06 PROCEDURE — 3008F PR BODY MASS INDEX (BMI) DOCUMENTED: ICD-10-PCS | Mod: HCNC,CPTII,S$GLB, | Performed by: SURGERY

## 2020-10-06 PROCEDURE — 3078F DIAST BP <80 MM HG: CPT | Mod: HCNC,CPTII,S$GLB, | Performed by: STUDENT IN AN ORGANIZED HEALTH CARE EDUCATION/TRAINING PROGRAM

## 2020-10-06 PROCEDURE — 1101F PT FALLS ASSESS-DOCD LE1/YR: CPT | Mod: HCNC,CPTII,S$GLB, | Performed by: SURGERY

## 2020-10-06 PROCEDURE — 3008F BODY MASS INDEX DOCD: CPT | Mod: HCNC,CPTII,S$GLB, | Performed by: SURGERY

## 2020-10-06 PROCEDURE — 3074F SYST BP LT 130 MM HG: CPT | Mod: HCNC,CPTII,S$GLB, | Performed by: STUDENT IN AN ORGANIZED HEALTH CARE EDUCATION/TRAINING PROGRAM

## 2020-10-06 PROCEDURE — 99213 OFFICE O/P EST LOW 20 MIN: CPT | Mod: HCNC,S$GLB,, | Performed by: STUDENT IN AN ORGANIZED HEALTH CARE EDUCATION/TRAINING PROGRAM

## 2020-10-06 PROCEDURE — 3074F SYST BP LT 130 MM HG: CPT | Mod: HCNC,CPTII,S$GLB, | Performed by: SURGERY

## 2020-10-06 PROCEDURE — 99203 OFFICE O/P NEW LOW 30 MIN: CPT | Mod: HCNC,S$GLB,, | Performed by: SURGERY

## 2020-10-06 PROCEDURE — 99203 PR OFFICE/OUTPT VISIT, NEW, LEVL III, 30-44 MIN: ICD-10-PCS | Mod: HCNC,S$GLB,, | Performed by: SURGERY

## 2020-10-06 PROCEDURE — 1159F PR MEDICATION LIST DOCUMENTED IN MEDICAL RECORD: ICD-10-PCS | Mod: HCNC,S$GLB,, | Performed by: STUDENT IN AN ORGANIZED HEALTH CARE EDUCATION/TRAINING PROGRAM

## 2020-10-06 PROCEDURE — 1159F PR MEDICATION LIST DOCUMENTED IN MEDICAL RECORD: ICD-10-PCS | Mod: HCNC,S$GLB,, | Performed by: SURGERY

## 2020-10-06 PROCEDURE — 3078F DIAST BP <80 MM HG: CPT | Mod: HCNC,CPTII,S$GLB, | Performed by: SURGERY

## 2020-10-06 PROCEDURE — 99213 PR OFFICE/OUTPT VISIT, EST, LEVL III, 20-29 MIN: ICD-10-PCS | Mod: HCNC,S$GLB,, | Performed by: STUDENT IN AN ORGANIZED HEALTH CARE EDUCATION/TRAINING PROGRAM

## 2020-10-06 PROCEDURE — 1126F AMNT PAIN NOTED NONE PRSNT: CPT | Mod: HCNC,S$GLB,, | Performed by: SURGERY

## 2020-10-06 PROCEDURE — 3078F PR MOST RECENT DIASTOLIC BLOOD PRESSURE < 80 MM HG: ICD-10-PCS | Mod: HCNC,CPTII,S$GLB, | Performed by: STUDENT IN AN ORGANIZED HEALTH CARE EDUCATION/TRAINING PROGRAM

## 2020-10-06 PROCEDURE — 97803 MED NUTRITION INDIV SUBSEQ: CPT | Mod: HCNC,S$GLB,, | Performed by: DIETITIAN, REGISTERED

## 2020-10-06 PROCEDURE — 99999 PR PBB SHADOW E&M-EST. PATIENT-LVL III: ICD-10-PCS | Mod: PBBFAC,HCNC,, | Performed by: SURGERY

## 2020-10-06 PROCEDURE — 3008F BODY MASS INDEX DOCD: CPT | Mod: HCNC,CPTII,S$GLB, | Performed by: STUDENT IN AN ORGANIZED HEALTH CARE EDUCATION/TRAINING PROGRAM

## 2020-10-06 PROCEDURE — 3008F PR BODY MASS INDEX (BMI) DOCUMENTED: ICD-10-PCS | Mod: HCNC,CPTII,S$GLB, | Performed by: STUDENT IN AN ORGANIZED HEALTH CARE EDUCATION/TRAINING PROGRAM

## 2020-10-06 PROCEDURE — 1126F PR PAIN SEVERITY QUANTIFIED, NO PAIN PRESENT: ICD-10-PCS | Mod: HCNC,S$GLB,, | Performed by: STUDENT IN AN ORGANIZED HEALTH CARE EDUCATION/TRAINING PROGRAM

## 2020-10-06 PROCEDURE — 1159F MED LIST DOCD IN RCRD: CPT | Mod: HCNC,S$GLB,, | Performed by: STUDENT IN AN ORGANIZED HEALTH CARE EDUCATION/TRAINING PROGRAM

## 2020-10-06 PROCEDURE — 3074F PR MOST RECENT SYSTOLIC BLOOD PRESSURE < 130 MM HG: ICD-10-PCS | Mod: HCNC,CPTII,S$GLB, | Performed by: STUDENT IN AN ORGANIZED HEALTH CARE EDUCATION/TRAINING PROGRAM

## 2020-10-06 PROCEDURE — 99999 PR PBB SHADOW E&M-EST. PATIENT-LVL III: CPT | Mod: PBBFAC,HCNC,, | Performed by: SURGERY

## 2020-10-06 PROCEDURE — 1126F AMNT PAIN NOTED NONE PRSNT: CPT | Mod: HCNC,S$GLB,, | Performed by: STUDENT IN AN ORGANIZED HEALTH CARE EDUCATION/TRAINING PROGRAM

## 2020-10-06 PROCEDURE — 99499 NO LOS: ICD-10-PCS | Mod: HCNC,S$GLB,, | Performed by: DIETITIAN, REGISTERED

## 2020-10-06 PROCEDURE — 3074F PR MOST RECENT SYSTOLIC BLOOD PRESSURE < 130 MM HG: ICD-10-PCS | Mod: HCNC,CPTII,S$GLB, | Performed by: SURGERY

## 2020-10-06 PROCEDURE — 1159F MED LIST DOCD IN RCRD: CPT | Mod: HCNC,S$GLB,, | Performed by: SURGERY

## 2020-10-06 RX ORDER — SEMAGLUTIDE 1.34 MG/ML
0.5 INJECTION, SOLUTION SUBCUTANEOUS
Qty: 1.5 ML | Refills: 0 | Status: SHIPPED | OUTPATIENT
Start: 2020-10-06 | End: 2020-10-28

## 2020-10-06 NOTE — PROGRESS NOTES
Subjective:       Patient ID: Ca Coats is a 69 y.o. female.    Chief Complaint: Follow-up    Follow-up, appointment #4    Diet Recall:  Ensure protein shake  Yogurt with blueberries  Turkey meatball and spinach  banana    Physical Activity: seated resistance band exercises    Co-morbidities associated with obesity:   HTN on Norvasc and lisinopril  HLD on Lipitor and fish oil  GERD on Protonix  LAURA on CPAP  Osteoarthritis, DJD  Glaucoma suspect    Weight Loss History (patient unable to use InBody):  7/7/2020: 394 lbs, BMI 63.73; started metformin 500 mg twice daily  8/5/2020: 393 lbs, BMI 63.52; discontinue metformin, start Ozempic 0.25 mg weekly injection  9/8/2020: 386 lbs, BMI 62.31; Ozempic 0.5 mg weekly injection  10/6/2020: 379 lbs, BMI 61.20; Ozempic 0.5 mg weekly injection    Review of Systems   Constitutional: Negative for chills and fever.   HENT: Negative for sore throat and trouble swallowing.    Eyes: Negative for visual disturbance.   Respiratory: Positive for shortness of breath.    Cardiovascular: Negative for chest pain and palpitations.   Gastrointestinal: Negative for abdominal pain, nausea and vomiting.   Musculoskeletal: Positive for gait problem.   Integumentary:  Negative for rash.   Neurological: Negative for dizziness and light-headedness.   Psychiatric/Behavioral: The patient is not nervous/anxious.          Objective:      Ref. Range 7/28/2020 09:52   Hemoglobin A1C External Latest Ref Range: 4.0 - 5.6 % 4.7   Estimated Avg Glucose Latest Ref Range: 68 - 131 mg/dL 88     Cannot stand on InBody    Vitals:    10/06/20 1101   BP: 118/72   Pulse: 82   Resp: (!) 95       Physical Exam  Vitals signs reviewed.   Constitutional:       General: She is not in acute distress.     Appearance: Normal appearance. She is obese. She is not ill-appearing, toxic-appearing or diaphoretic.   HENT:      Head: Normocephalic and atraumatic.   Eyes:      Extraocular Movements: Extraocular movements  intact.   Neck:      Musculoskeletal: Normal range of motion.   Cardiovascular:      Rate and Rhythm: Normal rate.   Pulmonary:      Effort: Pulmonary effort is normal. No respiratory distress.   Musculoskeletal:      Comments: In wheelchair   Skin:     General: Skin is warm and dry.   Neurological:      Mental Status: She is alert and oriented to person, place, and time.   Psychiatric:         Mood and Affect: Mood normal.         Behavior: Behavior normal.         Thought Content: Thought content normal.         Judgment: Judgment normal.         Assessment:       1. Class 3 severe obesity due to excess calories with serious comorbidity and body mass index (BMI) of 60.0 to 69.9 in adult    2. Essential hypertension    3. Mixed hyperlipidemia    4. Gastroesophageal reflux disease without esophagitis    5. Obstructive sleep apnea        Plan:   - Ozempic 0.5 mg weekly injections     - Following bariatric dietician     - Continue seated resistance band exercises     - Return to clinic in 4 weeks

## 2020-10-06 NOTE — PROGRESS NOTES
NUTRITION NOTE     Referring Physician: Irina Cleveland MD  Reason for MNT Referral: Follow up Assessment pending Gastric Sleeve     Patient presents for f/u visit with 15 lbs weight loss since starting medical weight loss with Dr. Rollins and following low carb diet. She had gained 30 lbs earlier this year after quitting smoking and with increased appetite and cravings.     CLINICAL DATA:  69 y.o. female.     Current Weight: 379 lbs  Weight Change Since Initial Visit: +13 lbs  Ideal Body Weight: 141 lbs  Body mass index is 61  Last Weight: 383.5 lbs   lbs        CURRENT DIET:  Diet Recall: Food records are not present.    2pm: 6oz yogurt with 1 tbsp dried cranberries  7:30pm: turkey meatball, spinach  8pm: banana     Diet Includes:   Meal Pattern: 2 meals and 1-2 snacks  Protein Supplements: BF prot powder + Fairlife milk. Has high prot Boost shakes - doesn't like the taste. Wants to try Fairlife nutrition plan shakes. [place] instant breakfast 26g prot.  Snacking: Adequate. Snacks include peanut butter  Vegetables: Likes a variety. Eats almost daily.  Fruits: Likes a few. Eats almost daily. banana  Beverages: water, coffee without sugar, and sugar-free jello  Dining Out:  Never. Mostly none.  Cooking at home: Daily. Mostly baked meat and vegetables.     CURRENT EXERCISE:  Pt used wheelchair during visit  Walks with in house with cane     Vitamins / Minerals / Herbs:   B complex with vitamin C  Iron   Multivitamin     Food Allergies:   None reported     Social:  Disabled.  Alcohol: None.  Smoking: Jan 2nd quit smoking completely        Doing well with working on greatest challenges (starchy CHO).     Barriers to Education:  none  Stage of Change:  action     NUTRITION DIAGNOSIS:  Morbid Obesity related to Inadequate protein intake and Physical inactivity as evidence by BMI.  Status: Improved     RECOMMENDATION:  Pt is a good candidate for surgery - Gastric Bypass.    Will f/u before/after surgery as  needed.     Communicated nutrition plan with bariatric team.     SESSION TIME:  15 minutes

## 2020-10-06 NOTE — PROGRESS NOTES
LINKS immunization registry updated  Care Everywhere updated  Health Maintenance updated  Chart reviewed for overdue Proactive [facility] Encounters (DONN) health maintenance testing (CRS, Breast Ca, Diabetic Eye Exam)   Orders entered:N/A

## 2020-10-06 NOTE — LETTER
October 12, 2020      Juan Neves MD  [address]  [place] [address]           [facility]  [address]  [facility] [address]  Phone: [phone]          Patient: Ca Coats   MR Number: [id number]   YOB: 1950   Date of Visit: 10/6/2020       Dear Dr. Juan Neves:    Thank you for referring Ca Coats to me for evaluation. Attached you will find relevant portions of my assessment and plan of care.    If you have questions, please do not hesitate to call me. I look forward to following Ca Coats along with you.    Sincerely,    Kaushal Gallardo MD    Enclosure  CC:  No Recipients    If you would like to receive this communication electronically, please contact [email or web address] or [phone] to request more information on [email or web address] Link access.    For providers and/or their staff who would like to refer a patient to [facility], please contact us through our one-stop-shop provider referral line, [facility], at [phone].    If you feel you have received this communication in error or would no longer like to receive these types of communications, please e-mail [email or web address]

## 2020-10-09 NOTE — PROGRESS NOTES
History & Physical  General Surgery    SUBJECTIVE:     History of Present Illness:  Patient is a 69 y.o. female presents posterior neck mass  Has been present for quite a while now  Slowly getting larger  Not really painful  No episodes of drainage  Appears to be a lipoma  Denies weight loss or constitutional symptoms.    No chief complaint on file.      Review of patient's allergies indicates:  No Known Allergies    Current Outpatient Medications   Medication Sig Dispense Refill    acetaminophen (TYLENOL) 500 MG tablet       albuterol (PROAIR HFA) 90 mcg/actuation inhaler Inhale 2 puffs into the lungs every 6 (six) hours as needed for Wheezing. Rescue 18 g 2    amLODIPine (NORVASC) 10 MG tablet TAKE 1 TABLET EVERY DAY 90 tablet 1    atorvastatin (LIPITOR) 40 MG tablet Take 1 tablet (40 mg total) by mouth once daily. 90 tablet 3    B-complex with vitamin C (Z-BEC OR EQUIV) tablet Take 1 tablet by mouth once daily.       CALCIUM CITRATE-VITAMIN D2 ORAL Take 1 tablet by mouth once daily.       diclofenac sodium (VOLTAREN) 1 % Gel Voltaren 1 % topical gel      ferrous gluconate (FERGON) 324 MG tablet Take 1 tablet by mouth once daily.      gabapentin (NEURONTIN) 300 MG capsule Take 2 capsules (600 mg total) by mouth 3 (three) times daily. 540 capsule 1    lisinopriL (PRINIVIL,ZESTRIL) 40 MG tablet TAKE 1 TABLET EVERY DAY 90 tablet 1    multivitamin with minerals tablet Take 1 tablet by mouth once daily.       omega 3-dha-epa-fish oil 1,000 mg (120 mg-180 mg) Cap       oxyCODONE-acetaminophen (PERCOCET) 5-325 mg per tablet Take 1 tablet by mouth 3 (three) times daily as needed for Pain. 90 tablet 0    pantoprazole (PROTONIX) 40 MG tablet Take 1 tablet (40 mg total) by mouth once daily. 90 tablet 1    semaglutide (OZEMPIC) 0.25 mg or 0.5 mg(2 mg/1.5 mL) PnIj Inject 0.5 mg into the skin every 7 days. for 4 doses 1.5 mL 0     Current Facility-Administered Medications   Medication Dose Route Frequency  Provider Last Rate Last Dose    sodium hyaluronate (EUFLEXXA) 10 mg/mL(mw 2.4 -3.6 million) injection 20 mg  20 mg Intra-articular 1 time in Clinic/FREIDA Clark MD        sodium hyaluronate (EUFLEXXA) 10 mg/mL(mw 2.4 -3.6 million) injection 20 mg  20 mg Intra-articular 1 time in Clinic/FREIDA Clark MD        sodium hyaluronate (EUFLEXXA) 10 mg/mL(mw 2.4 -3.6 million) injection 20 mg  20 mg Intra-articular 1 time in Clinic/FREIDA Clark MD        sodium hyaluronate (EUFLEXXA) 10 mg/mL(mw 2.4 -3.6 million) injection 20 mg  20 mg Intra-articular 1 time in Clinic/FREIDA Clark MD           Past Medical History:   Diagnosis Date    Anemia 2018    Arthritis     BMI 60.0-69.9, adult     Cataract     Depression     Dry eye syndrome     GERD (gastroesophageal reflux disease)     Glaucoma suspect     History of gastric ulcer     Hyperlipidemia     Hypertension     Hypothyroidism     Morbid obesity     Neuromuscular disorder     Thyroid disease      Past Surgical History:   Procedure Laterality Date    APPENDECTOMY      CATARACT EXTRACTION W/  INTRAOCULAR LENS IMPLANT Bilateral     MFIOL OU    INJECTION OF ANESTHETIC AGENT AROUND NERVE Left 7/10/2018    Procedure: BLOCK, NERVE;  Surgeon: Samantha Vidal MD;  Location: [facility];  Service: Pain Management;  Laterality: Left;  Genicular     INJECTION OF ANESTHETIC AGENT AROUND NERVE Left 7/19/2019    Procedure: Block, Nerve NERVE BLOCK GENICULAR WITH PHENOL 6%;  Surgeon: Samantha Vidal MD;  Location: [facility];  Service: Pain Management;  Laterality: Left;  NEEDS CONSENT    RADIOFREQUENCY ABLATION Left 6/19/2020    Procedure: RADIOFREQUENCY ABLATION LEFT GENICULAR;  Surgeon: Tevin Clark MD;  Location: [facility];  Service: Pain Management;  Laterality: Left;  Left Genicular RFA     Family History   Problem Relation Age of Onset    No Known Problems Mother     No Known Problems Father      Social  History     Tobacco Use    Smoking status: Former Smoker     Packs/day: 0.25     Years: 50.00     Pack years: 12.50     Types: Cigarettes     Quit date: 2020     Years since quittin.7    Smokeless tobacco: Never Used   Substance Use Topics    Alcohol use: Not Currently    Drug use: No        Review of Systems:  Review of Systems   Constitutional: Negative for chills and fever.   Respiratory: Negative for cough and shortness of breath.    Cardiovascular: Negative for chest pain and palpitations.   Gastrointestinal: Negative for abdominal pain, nausea and vomiting.   Genitourinary: Negative for dysuria and hematuria.   Musculoskeletal: Negative for back pain and gait problem.   Skin: Negative for color change, rash and wound.   Neurological: Negative for weakness and headaches.   Hematological: Negative for adenopathy. Does not bruise/bleed easily.   Psychiatric/Behavioral: Negative for agitation and confusion.       OBJECTIVE:     Vital Signs (Most Recent)  Temp: 98 °F (36.7 °C) (10/06/20 1329)  Pulse: 82 (10/06/20 1329)  BP: 118/72 (10/06/20 1329)  SpO2: 95 % (10/06/20 1329)     (!) 171.9 kg (379 lb)     Physical Exam:  Physical Exam  Constitutional:       General: She is not in acute distress.     Appearance: Normal appearance. She is not ill-appearing.   HENT:      Head: Normocephalic and atraumatic.   Eyes:      General: No scleral icterus.     Pupils: Pupils are equal, round, and reactive to light.   Neck:      Musculoskeletal: No edema or erythema.      Trachea: No tracheal deviation.      Comments: 3x3 cm posterior neck mass resembling lipoma  Cardiovascular:      Rate and Rhythm: Normal rate and regular rhythm.   Pulmonary:      Effort: Pulmonary effort is normal. No respiratory distress.      Breath sounds: No stridor.   Abdominal:      General: There is no distension.      Palpations: Abdomen is soft.      Tenderness: There is no abdominal tenderness.   Musculoskeletal:         General: No  deformity or signs of injury.   Skin:     General: Skin is warm and dry.      Coloration: Skin is not jaundiced.   Neurological:      General: No focal deficit present.      Mental Status: She is alert and oriented to person, place, and time.   Psychiatric:         Mood and Affect: Mood normal.         Behavior: Behavior normal.         ASSESSMENT/PLAN:   69 year old woman with asymptomatic neck lipoma.    PLAN:Plan   Patient would like to defer excision for now.  Currently undergoing bariatric surgery evaluation and does not want to jeopardize potential operation if complications related to this operation occur.  Will consider removal following bariatric operation.    Kaushal Gallardo MD  Acute Care Surgery  [facility] [place] [facility]

## 2020-11-02 DIAGNOSIS — Z51.81 ENCOUNTER FOR MONITORING OPIOID MAINTENANCE THERAPY: Primary | ICD-10-CM

## 2020-11-02 DIAGNOSIS — Z79.891 ENCOUNTER FOR MONITORING OPIOID MAINTENANCE THERAPY: Primary | ICD-10-CM

## 2020-11-02 RX ORDER — OXYCODONE AND ACETAMINOPHEN 5; 325 MG/1; MG/1
1 TABLET ORAL EVERY 8 HOURS PRN
Qty: 90 TABLET | Refills: 0 | Status: SHIPPED | OUTPATIENT
Start: 2020-11-02 | End: 2020-12-01 | Stop reason: SDUPTHER

## 2020-11-02 NOTE — TELEPHONE ENCOUNTER
Patient was last seen in the office 09/03/30 by Savanna Hyatt. This is a  patient. Patient last received this medication 10/01/20. Patient completed UDS 10/04/19 and was consistent. Patient has a pain contract on file. Patient has a follow up 12/01/20

## 2020-11-03 ENCOUNTER — OFFICE VISIT (OUTPATIENT)
Dept: BARIATRICS | Facility: CLINIC | Age: 70
End: 2020-11-03
Payer: MEDICARE

## 2020-11-03 VITALS
BODY MASS INDEX: 47.09 KG/M2 | WEIGHT: 293 LBS | HEART RATE: 87 BPM | HEIGHT: 66 IN | SYSTOLIC BLOOD PRESSURE: 116 MMHG | DIASTOLIC BLOOD PRESSURE: 60 MMHG

## 2020-11-03 DIAGNOSIS — K21.9 GASTROESOPHAGEAL REFLUX DISEASE WITHOUT ESOPHAGITIS: ICD-10-CM

## 2020-11-03 DIAGNOSIS — E78.2 MIXED HYPERLIPIDEMIA: Chronic | ICD-10-CM

## 2020-11-03 DIAGNOSIS — I10 ESSENTIAL HYPERTENSION: ICD-10-CM

## 2020-11-03 DIAGNOSIS — G47.33 OBSTRUCTIVE SLEEP APNEA: ICD-10-CM

## 2020-11-03 DIAGNOSIS — E66.01 CLASS 3 SEVERE OBESITY DUE TO EXCESS CALORIES WITH SERIOUS COMORBIDITY AND BODY MASS INDEX (BMI) OF 60.0 TO 69.9 IN ADULT: Primary | ICD-10-CM

## 2020-11-03 DIAGNOSIS — M19.90 OSTEOARTHRITIS, UNSPECIFIED OSTEOARTHRITIS TYPE, UNSPECIFIED SITE: ICD-10-CM

## 2020-11-03 PROCEDURE — 3074F PR MOST RECENT SYSTOLIC BLOOD PRESSURE < 130 MM HG: ICD-10-PCS | Mod: HCNC,CPTII,S$GLB, | Performed by: STUDENT IN AN ORGANIZED HEALTH CARE EDUCATION/TRAINING PROGRAM

## 2020-11-03 PROCEDURE — 1101F PR PT FALLS ASSESS DOC 0-1 FALLS W/OUT INJ PAST YR: ICD-10-PCS | Mod: HCNC,CPTII,S$GLB, | Performed by: STUDENT IN AN ORGANIZED HEALTH CARE EDUCATION/TRAINING PROGRAM

## 2020-11-03 PROCEDURE — 99999 PR PBB SHADOW E&M-EST. PATIENT-LVL IV: CPT | Mod: PBBFAC,HCNC,, | Performed by: STUDENT IN AN ORGANIZED HEALTH CARE EDUCATION/TRAINING PROGRAM

## 2020-11-03 PROCEDURE — 99999 PR PBB SHADOW E&M-EST. PATIENT-LVL IV: ICD-10-PCS | Mod: PBBFAC,HCNC,, | Performed by: STUDENT IN AN ORGANIZED HEALTH CARE EDUCATION/TRAINING PROGRAM

## 2020-11-03 PROCEDURE — 3078F DIAST BP <80 MM HG: CPT | Mod: HCNC,CPTII,S$GLB, | Performed by: STUDENT IN AN ORGANIZED HEALTH CARE EDUCATION/TRAINING PROGRAM

## 2020-11-03 PROCEDURE — 3078F PR MOST RECENT DIASTOLIC BLOOD PRESSURE < 80 MM HG: ICD-10-PCS | Mod: HCNC,CPTII,S$GLB, | Performed by: STUDENT IN AN ORGANIZED HEALTH CARE EDUCATION/TRAINING PROGRAM

## 2020-11-03 PROCEDURE — 1101F PT FALLS ASSESS-DOCD LE1/YR: CPT | Mod: HCNC,CPTII,S$GLB, | Performed by: STUDENT IN AN ORGANIZED HEALTH CARE EDUCATION/TRAINING PROGRAM

## 2020-11-03 PROCEDURE — 1159F PR MEDICATION LIST DOCUMENTED IN MEDICAL RECORD: ICD-10-PCS | Mod: HCNC,S$GLB,, | Performed by: STUDENT IN AN ORGANIZED HEALTH CARE EDUCATION/TRAINING PROGRAM

## 2020-11-03 PROCEDURE — 3008F BODY MASS INDEX DOCD: CPT | Mod: HCNC,CPTII,S$GLB, | Performed by: STUDENT IN AN ORGANIZED HEALTH CARE EDUCATION/TRAINING PROGRAM

## 2020-11-03 PROCEDURE — 99213 OFFICE O/P EST LOW 20 MIN: CPT | Mod: HCNC,S$GLB,, | Performed by: STUDENT IN AN ORGANIZED HEALTH CARE EDUCATION/TRAINING PROGRAM

## 2020-11-03 PROCEDURE — 3074F SYST BP LT 130 MM HG: CPT | Mod: HCNC,CPTII,S$GLB, | Performed by: STUDENT IN AN ORGANIZED HEALTH CARE EDUCATION/TRAINING PROGRAM

## 2020-11-03 PROCEDURE — 1126F PR PAIN SEVERITY QUANTIFIED, NO PAIN PRESENT: ICD-10-PCS | Mod: HCNC,S$GLB,, | Performed by: STUDENT IN AN ORGANIZED HEALTH CARE EDUCATION/TRAINING PROGRAM

## 2020-11-03 PROCEDURE — 3008F PR BODY MASS INDEX (BMI) DOCUMENTED: ICD-10-PCS | Mod: HCNC,CPTII,S$GLB, | Performed by: STUDENT IN AN ORGANIZED HEALTH CARE EDUCATION/TRAINING PROGRAM

## 2020-11-03 PROCEDURE — 1126F AMNT PAIN NOTED NONE PRSNT: CPT | Mod: HCNC,S$GLB,, | Performed by: STUDENT IN AN ORGANIZED HEALTH CARE EDUCATION/TRAINING PROGRAM

## 2020-11-03 PROCEDURE — 1159F MED LIST DOCD IN RCRD: CPT | Mod: HCNC,S$GLB,, | Performed by: STUDENT IN AN ORGANIZED HEALTH CARE EDUCATION/TRAINING PROGRAM

## 2020-11-03 PROCEDURE — 99213 PR OFFICE/OUTPT VISIT, EST, LEVL III, 20-29 MIN: ICD-10-PCS | Mod: HCNC,S$GLB,, | Performed by: STUDENT IN AN ORGANIZED HEALTH CARE EDUCATION/TRAINING PROGRAM

## 2020-11-03 RX ORDER — SEMAGLUTIDE 1.34 MG/ML
0.5 INJECTION, SOLUTION SUBCUTANEOUS
Qty: 1.5 ML | Refills: 2 | Status: SHIPPED | OUTPATIENT
Start: 2020-11-03 | End: 2020-11-25

## 2020-11-03 NOTE — PROGRESS NOTES
Subjective:       Patient ID: Ca Coats is a 69 y.o. female.    Chief Complaint: weight gain, follow-up    Follow-up, appointment #5    Plan for gastric sleeve with Dr. Cleveland     Following Bariatric Dietician  Diet Recall:  Ensure protein shake, [place] Instant breakfast  Yogurt with berries  Turkey meatball and spinach  banana     Physical Activity: seated resistance band exercises     Co-morbidities associated with obesity:   HTN on Norvasc and lisinopril  HLD on Lipitor and fish oil  GERD on Protonix  LAURA on CPAP  Osteoarthritis, DJD  Glaucoma suspect     Weight Loss History (patient unable to use InBody):  7/7/2020: 394 lbs, BMI 63.73; started metformin 500 mg twice daily  8/5/2020: 393 lbs, BMI 63.52; discontinue metformin, start Ozempic 0.25 mg weekly injection  9/8/2020: 386 lbs, BMI 62.31; Ozempic 0.5 mg weekly injection  10/6/2020: 379 lbs, BMI 61.20; Ozempic 0.5 mg weekly injection  11/3/2020: 370 lbs, BMI 59.85; Ozempic 0.5mg weekly injection    Review of Systems   Constitutional: Negative for chills and fever.   HENT: Negative for sore throat and trouble swallowing.    Eyes: Negative for visual disturbance.   Respiratory: Negative for shortness of breath.    Cardiovascular: Negative for chest pain and palpitations.   Gastrointestinal: Negative for abdominal pain, nausea and vomiting.   Integumentary:  Negative for rash.   Neurological: Negative for dizziness and light-headedness.   Psychiatric/Behavioral: The patient is not nervous/anxious.          Objective:       Cannot stand on InBody  Weight 370 lbs 13 oz  BMI 59.85    Vitals:    11/03/20 1141   BP: 116/60   Pulse: 87       Physical Exam  Vitals signs reviewed.   Constitutional:       General: She is not in acute distress.     Appearance: Normal appearance. She is obese. She is not ill-appearing, toxic-appearing or diaphoretic.   HENT:      Head: Normocephalic and atraumatic.   Eyes:      Extraocular Movements: Extraocular  movements intact.   Neck:      Musculoskeletal: Normal range of motion.   Cardiovascular:      Rate and Rhythm: Normal rate.   Pulmonary:      Effort: Pulmonary effort is normal. No respiratory distress.   Skin:     General: Skin is warm and dry.   Neurological:      General: No focal deficit present.      Mental Status: She is alert and oriented to person, place, and time.      Gait: Gait normal.   Psychiatric:         Mood and Affect: Mood normal.         Behavior: Behavior normal.         Thought Content: Thought content normal.         Judgment: Judgment normal.         Assessment:       1. Class 3 severe obesity due to excess calories with serious comorbidity and body mass index (BMI) of 60.0 to 69.9 in adult    2. Essential hypertension    3. Mixed hyperlipidemia    4. Gastroesophageal reflux disease without esophagitis    5. Osteoarthritis, unspecified osteoarthritis type, unspecified site    6. Obstructive sleep apnea        Plan:    - Ozempic 0.5 mg weekly injections     - Following bariatric dietician     - Continue seated resistance band exercises     - Return to clinic in 4 weeks    - Follow-up with bariatric surgery for next steps in the work-up

## 2020-11-10 ENCOUNTER — TELEPHONE (OUTPATIENT)
Dept: BARIATRICS | Facility: CLINIC | Age: 70
End: 2020-11-10

## 2020-11-10 NOTE — TELEPHONE ENCOUNTER
----- Message from Hilary Edwards MD sent at 11/3/2020  2:02 PM CST -----  Regarding: pre-surgical workup  Patient was asking me about next steps for surgery. She has gotten down to 370 lbs. Not clear if she needs to be 373.5 lbs or 360 lbs prior to surgery. Please follow-up with her on what she needs to do. I did tell her that Dr. Cleveland is on maternity leave and would be back at the end of the year, and advised her to try and lose an additional 10 lbs in the next 4-6 weeks.    Thank you.

## 2020-11-22 NOTE — PROGRESS NOTES
ANNUAL VISIT NOTE     PRESENTING HISTORY     Reason for Visit:  Annual visit.    No chief complaint on file.      History of Present Illness & ROS: Ms. Ca Coats is a 69 y.o. female.  New to this provider and clinic.   No longer being followed by former PCP with [facility].   Very pleasant lady. No acute issues to report today.   She is hopeful of having Bariatric surgery in the coming months.   Requesting to est medical care with this practice site.     She has a Private State Sitter, but did not accompany to the visit today.    Review of Systems:  Eyes: denies visual changes at this time denies floaters   ENT: no nasal congestion or sore throat  Respiratory: no cough or shorness of breath  Cardiovascular: no chest pain or palpitations  Gastrointestinal: no nausea or vomiting, no abdominal pain or change in bowel habits  Genitourinary: no hematuria or dysuria; denies frequency  Hematologic/Lymphatic: no easy bruising or lymphadenopathy  Musculoskeletal: no arthralgias or myalgias  Neurological: no seizures or tremors  Endocrine: no heat or cold intolerance    PAST HISTORY:     Past Medical History:   Diagnosis Date    Anemia     2018    Arthritis     BMI 60.0-69.9, adult     Cataract     Depression     Dry eye syndrome     GERD (gastroesophageal reflux disease)     Glaucoma suspect     History of gastric ulcer     Hyperlipidemia     Hypertension     Hypothyroidism     Morbid obesity     Neuromuscular disorder     Thyroid disease        Past Surgical History:   Procedure Laterality Date    APPENDECTOMY      CATARACT EXTRACTION W/  INTRAOCULAR LENS IMPLANT Bilateral     MFIOL OU    INJECTION OF ANESTHETIC AGENT AROUND NERVE Left 7/10/2018    Procedure: BLOCK, NERVE;  Surgeon: Samantha Vidal MD;  Location: [facility];  Service: Pain Management;  Laterality: Left;  Genicular     INJECTION OF ANESTHETIC AGENT AROUND NERVE Left 7/19/2019    Procedure: Block, Nerve NERVE BLOCK GENICULAR  WITH PHENOL 6%;  Surgeon: Samantha Vidal MD;  Location: [facility];  Service: Pain Management;  Laterality: Left;  NEEDS CONSENT    RADIOFREQUENCY ABLATION Left 2020    Procedure: RADIOFREQUENCY ABLATION LEFT GENICULAR;  Surgeon: Tevin Clark MD;  Location: [facility];  Service: Pain Management;  Laterality: Left;  Left Genicular RFA       Family History   Problem Relation Age of Onset    No Known Problems Mother     No Known Problems Father        Social History     Socioeconomic History    Marital status: Single     Spouse name: Not on file    Number of children: 2    Years of education: Not on file    Highest education level: Not on file   Occupational History     Comment: retired  and cook   Social Needs    Financial resource strain: Not on file    Food insecurity     Worry: Not on file     Inability: Not on file    Transportation needs     Medical: Not on file     Non-medical: Not on file   Tobacco Use    Smoking status: Former Smoker     Packs/day: 0.25     Years: 50.00     Pack years: 12.50     Types: Cigarettes     Quit date: 2020     Years since quittin.8    Smokeless tobacco: Never Used   Substance and Sexual Activity    Alcohol use: Not Currently    Drug use: No    Sexual activity: Not Currently     Partners: Male   Lifestyle    Physical activity     Days per week: Not on file     Minutes per session: Not on file    Stress: Not on file   Relationships    Social connections     Talks on phone: Not on file     Gets together: Not on file     Attends Denominational service: Not on file     Active member of club or organization: Not on file     Attends meetings of clubs or organizations: Not on file     Relationship status: Not on file   Other Topics Concern    Not on file   Social History Narrative    Not on file       MEDICATIONS & ALLERGIES:     Current Outpatient Medications on File Prior to Visit   Medication Sig Dispense Refill    acetaminophen  (TYLENOL) 500 MG tablet       albuterol (PROAIR HFA) 90 mcg/actuation inhaler Inhale 2 puffs into the lungs every 6 (six) hours as needed for Wheezing. Rescue 18 g 2    amLODIPine (NORVASC) 10 MG tablet TAKE 1 TABLET EVERY DAY 90 tablet 1    atorvastatin (LIPITOR) 40 MG tablet Take 1 tablet (40 mg total) by mouth once daily. 90 tablet 3    B-complex with vitamin C (Z-BEC OR EQUIV) tablet Take 1 tablet by mouth once daily.       CALCIUM CITRATE-VITAMIN D2 ORAL Take 1 tablet by mouth once daily.       diclofenac sodium (VOLTAREN) 1 % Gel Voltaren 1 % topical gel      ferrous gluconate (FERGON) 324 MG tablet Take 1 tablet by mouth once daily.      gabapentin (NEURONTIN) 300 MG capsule Take 2 capsules (600 mg total) by mouth 3 (three) times daily. 540 capsule 1    lisinopriL (PRINIVIL,ZESTRIL) 40 MG tablet TAKE 1 TABLET EVERY DAY 90 tablet 1    multivitamin with minerals tablet Take 1 tablet by mouth once daily.       omega 3-dha-epa-fish oil 1,000 mg (120 mg-180 mg) Cap       oxyCODONE-acetaminophen (PERCOCET) 5-325 mg per tablet Take 1 tablet by mouth every 8 (eight) hours as needed for Pain. 90 tablet 0    pantoprazole (PROTONIX) 40 MG tablet Take 1 tablet (40 mg total) by mouth once daily. 90 tablet 1    semaglutide (OZEMPIC) 0.25 mg or 0.5 mg(2 mg/1.5 mL) PnIj Inject 0.5 mg into the skin every 7 days. for 4 doses 1.5 mL 2     Current Facility-Administered Medications on File Prior to Visit   Medication Dose Route Frequency Provider Last Rate Last Dose    sodium hyaluronate (EUFLEXXA) 10 mg/mL(mw 2.4 -3.6 million) injection 20 mg  20 mg Intra-articular 1 time in Clinic/FREIDA Clark MD        sodium hyaluronate (EUFLEXXA) 10 mg/mL(mw 2.4 -3.6 million) injection 20 mg  20 mg Intra-articular 1 time in Clinic/FREIDA Clark MD        sodium hyaluronate (EUFLEXXA) 10 mg/mL(mw 2.4 -3.6 million) injection 20 mg  20 mg Intra-articular 1 time in Clinic/HOD Tevin Clark MD        sodium  hyaluronate (EUFLEXXA) 10 mg/mL(mw 2.4 -3.6 million) injection 20 mg  20 mg Intra-articular 1 time in Clinic/HOD Tevin Clark MD            Review of patient's allergies indicates:  No Known Allergies    Medications Reconciliation:   I have reconciled the patient's home medications and discharge medications with the patient/family. I have updated all changes.  Refer to After-Visit Medication List.    OBJECTIVE:     Vital Signs:  There were no vitals filed for this visit.  Wt Readings from Last 1 Encounters:   11/03/20 1141 (!) 168.2 kg (370 lb 13 oz)     There is no height or weight on file to calculate BMI.   Wt Readings from Last 3 Encounters:   11/24/20 (!) (P) 167.8 kg (370 lb)   11/03/20 (!) 168.2 kg (370 lb 13 oz)   10/06/20 (!) 171.9 kg (379 lb)     Temp Readings from Last 3 Encounters:   10/06/20 98 °F (36.7 °C)   09/03/20 98.9 °F (37.2 °C)   07/28/20 97.5 °F (36.4 °C)     BP Readings from Last 3 Encounters:   11/03/20 116/60   10/06/20 118/72   10/06/20 118/72     Pulse Readings from Last 3 Encounters:   11/24/20 (P) 82   11/03/20 87   10/06/20 82       Physical Exam:  General: Well developed, well nourished. No distress.  HEENT: Head is normocephalic, atraumatic; ears are normal.   Eyes: Clear conjunctiva.  Neck: Supple, symmetrical neck; trachea midline.  Lungs: Clear to auscultation bilaterally and normal respiratory effort.  Cardiovascular: Heart with regular rate and rhythm. No murmurs, gallops or rubs  Extremities: No LE edema. Pulses 2+ and symmetric.   Abdomen: Abdomen is soft, non-tender non-distended with normal bowel sounds.  Skin: Skin color, texture, turgor normal. No rashes.  Musculoskeletal: Normal gait.   Lymph Nodes: No cervical or supraclavicular adenopathy.  Neurologic: Normal strength and tone. No focal numbness or weakness.   Psychiatric: Not depressed.    Laboratory  Lab Results   Component Value Date    WBC 11.70 07/19/2019    HGB 11.7 (L) 07/19/2019    HCT 36.1 (L) 07/19/2019     " 07/19/2019    CHOL 101 (L) 07/28/2020    TRIG 112 07/28/2020    HDL 49 07/28/2020    ALT 11 07/28/2020    AST 12 07/28/2020     07/28/2020    K 4.5 07/28/2020     07/28/2020    CREATININE 0.8 07/28/2020    BUN 14 07/28/2020    CO2 27 07/28/2020    TSH 0.913 12/10/2019    INR 0.9 07/15/2019    HGBA1C 4.7 07/28/2020       ASSESSMENT & PLAN:     Preventative health care  -     Comprehensive Metabolic Panel; Future; Expected date: 11/24/2020  -     CBC Auto Differential; Future; Expected date: 11/24/2020  -     Lipid Panel; Future; Expected date: 11/24/2020  -     Hemoglobin A1C; Future; Expected date: 11/24/2020  -     TSH; Future; Expected date: 11/24/2020  -     Vitamin D; Future; Expected date: 11/24/2020  -     Ferritin; Future; Expected date: 11/24/2020    Essential hypertension  Today: 110/58  ` Lisinopril  ` Norvasc    Mixed hyperlipidemia:  ` Continue Lipitor 40  -     Lipid Panel; Future; Expected date: 11/24/2020      Major depressive disorder, remission status unspecified, unspecified whether recurrent  Stable    Obstructive sleep apnea  Unsure of her current settings;   ? 6, but reports compliance with nightly use.       Chronic narcotic dependence:   *Pain Mgt Contract; deferred; cannot be managed by the [facility] due to restrictions within Scope of Practice guidelines.   ` appt 12/1/2020      Iron deficiency anemia, unspecified iron deficiency anemia type  ` [place]  -     Ferritin; Future; Expected date: 11/24/2020      Vitamin D deficiency, unspecified   -     Vitamin D; Future; Expected date: 11/24/2020      Scalp Lipoma:   Seen by Gen Surg for excision; surgery held off on per the patient due to "not causing any problem".       Immunizations  Shingrix Series: order given for future   Flu Vaccine: today       *Advised to follow up with one of our IM Physician Providers to be considered fully est'd in medical care with practice site. Will perform Annual PE Today    *Not Fasting today. Labs " next week    Future Appointments   Date Time Provider Department Center   11/24/2020  1:10 PM FLU, INTERNAL MEDICINE [facility] IM Dario Belen PCW   12/1/2020 11:00 AM Savanna Hyatt NP [facility]   12/3/2020 11:15 AM Hilary Edwards MD [facility] SHELBY Glover        Medication List          Accurate as of November 24, 2020 11:20 AM. If you have any questions, ask your nurse or doctor.            CONTINUE taking these medications    acetaminophen 500 MG tablet  Commonly known as: TYLENOL     albuterol 90 mcg/actuation inhaler  Commonly known as: PROAIR HFA  Inhale 2 puffs into the lungs every 6 (six) hours as needed for Wheezing. Rescue     amLODIPine 10 MG tablet  Commonly known as: NORVASC  TAKE 1 TABLET EVERY DAY     atorvastatin 40 MG tablet  Commonly known as: LIPITOR  Take 1 tablet (40 mg total) by mouth once daily.     B-complex with vitamin C tablet  Commonly known as: Z-Bec or Equiv     CALCIUM CITRATE-VITAMIN D2 ORAL     ferrous gluconate 324 MG tablet  Commonly known as: FERGON     gabapentin 300 MG capsule  Commonly known as: NEURONTIN  Take 2 capsules (600 mg total) by mouth 3 (three) times daily.     lisinopriL 40 MG tablet  Commonly known as: PRINIVIL,ZESTRIL  TAKE 1 TABLET EVERY DAY     multivitamin with minerals tablet     omega 3-dha-epa-fish oil 1,000 mg (120 mg-180 mg) Cap     oxyCODONE-acetaminophen 5-325 mg per tablet  Commonly known as: PERCOCET  Take 1 tablet by mouth every 8 (eight) hours as needed for Pain.     OZEMPIC 0.25 mg or 0.5 mg(2 mg/1.5 mL) Pnij  Generic drug: semaglutide  Inject 0.5 mg into the skin every 7 days. for 4 doses     pantoprazole 40 MG tablet  Commonly known as: PROTONIX  Take 1 tablet (40 mg total) by mouth once daily.     VOLTAREN 1 % Gel  Generic drug: diclofenac sodium          Signing Physician:  EFFIE Siu

## 2020-11-24 ENCOUNTER — OFFICE VISIT (OUTPATIENT)
Dept: INTERNAL MEDICINE | Facility: CLINIC | Age: 70
End: 2020-11-24
Payer: MEDICARE

## 2020-11-24 ENCOUNTER — IMMUNIZATION (OUTPATIENT)
Dept: INTERNAL MEDICINE | Facility: CLINIC | Age: 70
End: 2020-11-24
Payer: MEDICARE

## 2020-11-24 VITALS — SYSTOLIC BLOOD PRESSURE: 110 MMHG | DIASTOLIC BLOOD PRESSURE: 58 MMHG

## 2020-11-24 DIAGNOSIS — E78.2 MIXED HYPERLIPIDEMIA: Chronic | ICD-10-CM

## 2020-11-24 DIAGNOSIS — G47.33 OBSTRUCTIVE SLEEP APNEA: ICD-10-CM

## 2020-11-24 DIAGNOSIS — D50.9 IRON DEFICIENCY ANEMIA, UNSPECIFIED IRON DEFICIENCY ANEMIA TYPE: ICD-10-CM

## 2020-11-24 DIAGNOSIS — I10 ESSENTIAL HYPERTENSION: ICD-10-CM

## 2020-11-24 DIAGNOSIS — Z00.00 PREVENTATIVE HEALTH CARE: Primary | ICD-10-CM

## 2020-11-24 DIAGNOSIS — F11.20 CHRONIC NARCOTIC DEPENDENCE: ICD-10-CM

## 2020-11-24 DIAGNOSIS — G89.29 OTHER CHRONIC PAIN: ICD-10-CM

## 2020-11-24 DIAGNOSIS — E55.9 VITAMIN D DEFICIENCY, UNSPECIFIED: ICD-10-CM

## 2020-11-24 DIAGNOSIS — F32.9 MAJOR DEPRESSIVE DISORDER, REMISSION STATUS UNSPECIFIED, UNSPECIFIED WHETHER RECURRENT: ICD-10-CM

## 2020-11-24 DIAGNOSIS — R79.9 ABNORMAL FINDING OF BLOOD CHEMISTRY, UNSPECIFIED: ICD-10-CM

## 2020-11-24 PROBLEM — R10.9 ABDOMINAL PAIN: Status: RESOLVED | Noted: 2017-12-07 | Resolved: 2020-11-24

## 2020-11-24 PROCEDURE — 99999 PR PBB SHADOW E&M-EST. PATIENT-LVL IV: ICD-10-PCS | Mod: PBBFAC,HCNC,, | Performed by: NURSE PRACTITIONER

## 2020-11-24 PROCEDURE — 1126F PR PAIN SEVERITY QUANTIFIED, NO PAIN PRESENT: ICD-10-PCS | Mod: HCNC,S$GLB,, | Performed by: NURSE PRACTITIONER

## 2020-11-24 PROCEDURE — 3288F FALL RISK ASSESSMENT DOCD: CPT | Mod: HCNC,CPTII,S$GLB, | Performed by: NURSE PRACTITIONER

## 2020-11-24 PROCEDURE — 3078F DIAST BP <80 MM HG: CPT | Mod: HCNC,CPTII,S$GLB, | Performed by: NURSE PRACTITIONER

## 2020-11-24 PROCEDURE — 3074F PR MOST RECENT SYSTOLIC BLOOD PRESSURE < 130 MM HG: ICD-10-PCS | Mod: HCNC,CPTII,S$GLB, | Performed by: NURSE PRACTITIONER

## 2020-11-24 PROCEDURE — G0008 FLU VACCINE - QUADRIVALENT - ADJUVANTED: ICD-10-PCS | Mod: HCNC,S$GLB,, | Performed by: INTERNAL MEDICINE

## 2020-11-24 PROCEDURE — 90694 VACC AIIV4 NO PRSRV 0.5ML IM: CPT | Mod: HCNC,S$GLB,, | Performed by: INTERNAL MEDICINE

## 2020-11-24 PROCEDURE — 90694 FLU VACCINE - QUADRIVALENT - ADJUVANTED: ICD-10-PCS | Mod: HCNC,S$GLB,, | Performed by: INTERNAL MEDICINE

## 2020-11-24 PROCEDURE — 1101F PR PT FALLS ASSESS DOC 0-1 FALLS W/OUT INJ PAST YR: ICD-10-PCS | Mod: HCNC,CPTII,S$GLB, | Performed by: NURSE PRACTITIONER

## 2020-11-24 PROCEDURE — 99397 PER PM REEVAL EST PAT 65+ YR: CPT | Mod: HCNC,S$GLB,, | Performed by: NURSE PRACTITIONER

## 2020-11-24 PROCEDURE — 99397 PR PREVENTIVE VISIT,EST,65 & OVER: ICD-10-PCS | Mod: HCNC,S$GLB,, | Performed by: NURSE PRACTITIONER

## 2020-11-24 PROCEDURE — 3074F SYST BP LT 130 MM HG: CPT | Mod: HCNC,CPTII,S$GLB, | Performed by: NURSE PRACTITIONER

## 2020-11-24 PROCEDURE — 1126F AMNT PAIN NOTED NONE PRSNT: CPT | Mod: HCNC,S$GLB,, | Performed by: NURSE PRACTITIONER

## 2020-11-24 PROCEDURE — 3288F PR FALLS RISK ASSESSMENT DOCUMENTED: ICD-10-PCS | Mod: HCNC,CPTII,S$GLB, | Performed by: NURSE PRACTITIONER

## 2020-11-24 PROCEDURE — G0008 ADMIN INFLUENZA VIRUS VAC: HCPCS | Mod: HCNC,S$GLB,, | Performed by: INTERNAL MEDICINE

## 2020-11-24 PROCEDURE — 3078F PR MOST RECENT DIASTOLIC BLOOD PRESSURE < 80 MM HG: ICD-10-PCS | Mod: HCNC,CPTII,S$GLB, | Performed by: NURSE PRACTITIONER

## 2020-11-24 PROCEDURE — 99999 PR PBB SHADOW E&M-EST. PATIENT-LVL IV: CPT | Mod: PBBFAC,HCNC,, | Performed by: NURSE PRACTITIONER

## 2020-11-24 PROCEDURE — 1101F PT FALLS ASSESS-DOCD LE1/YR: CPT | Mod: HCNC,CPTII,S$GLB, | Performed by: NURSE PRACTITIONER

## 2020-11-28 ENCOUNTER — PATIENT OUTREACH (OUTPATIENT)
Dept: ADMINISTRATIVE | Facility: OTHER | Age: 70
End: 2020-11-28

## 2020-11-28 NOTE — PROGRESS NOTES
Health Maintenance Due   Topic Date Due    Complete Opioid Risk Tool  12/19/1968    Colorectal Cancer Screening  12/19/2002    Shingles Vaccine (2 of 2) 02/27/2020     Updates were requested from care everywhere.  Chart was reviewed for overdue Proactive [facility] Encounters (DONN) topics (CRS, Breast Cancer Screening, Eye exam)  Health Maintenance has been updated.  LINKS immunization registry triggered.  Immunizations were reconciled.

## 2020-12-01 ENCOUNTER — OFFICE VISIT (OUTPATIENT)
Dept: PAIN MEDICINE | Facility: CLINIC | Age: 70
End: 2020-12-01
Payer: MEDICARE

## 2020-12-01 ENCOUNTER — LAB VISIT (OUTPATIENT)
Dept: LAB | Facility: OTHER | Age: 70
End: 2020-12-01
Attending: NURSE PRACTITIONER
Payer: MEDICARE

## 2020-12-01 VITALS
DIASTOLIC BLOOD PRESSURE: 67 MMHG | RESPIRATION RATE: 18 BRPM | SYSTOLIC BLOOD PRESSURE: 98 MMHG | WEIGHT: 293 LBS | BODY MASS INDEX: 47.09 KG/M2 | HEART RATE: 69 BPM | TEMPERATURE: 98 F | OXYGEN SATURATION: 100 % | HEIGHT: 66 IN

## 2020-12-01 DIAGNOSIS — Z79.891 ENCOUNTER FOR MONITORING OPIOID MAINTENANCE THERAPY: ICD-10-CM

## 2020-12-01 DIAGNOSIS — G89.4 CHRONIC PAIN DISORDER: Primary | ICD-10-CM

## 2020-12-01 DIAGNOSIS — Z51.81 ENCOUNTER FOR MONITORING OPIOID MAINTENANCE THERAPY: ICD-10-CM

## 2020-12-01 DIAGNOSIS — M25.562 CHRONIC PAIN OF LEFT KNEE: ICD-10-CM

## 2020-12-01 DIAGNOSIS — G89.29 CHRONIC PAIN OF LEFT KNEE: ICD-10-CM

## 2020-12-01 DIAGNOSIS — M17.12 PRIMARY OSTEOARTHRITIS OF LEFT KNEE: ICD-10-CM

## 2020-12-01 PROCEDURE — 3008F BODY MASS INDEX DOCD: CPT | Mod: HCNC,CPTII,S$GLB, | Performed by: NURSE PRACTITIONER

## 2020-12-01 PROCEDURE — 36415 COLL VENOUS BLD VENIPUNCTURE: CPT | Mod: HCNC

## 2020-12-01 PROCEDURE — 3074F SYST BP LT 130 MM HG: CPT | Mod: HCNC,CPTII,S$GLB, | Performed by: NURSE PRACTITIONER

## 2020-12-01 PROCEDURE — 3074F PR MOST RECENT SYSTOLIC BLOOD PRESSURE < 130 MM HG: ICD-10-PCS | Mod: HCNC,CPTII,S$GLB, | Performed by: NURSE PRACTITIONER

## 2020-12-01 PROCEDURE — 1159F PR MEDICATION LIST DOCUMENTED IN MEDICAL RECORD: ICD-10-PCS | Mod: HCNC,S$GLB,, | Performed by: NURSE PRACTITIONER

## 2020-12-01 PROCEDURE — 3288F PR FALLS RISK ASSESSMENT DOCUMENTED: ICD-10-PCS | Mod: HCNC,CPTII,S$GLB, | Performed by: NURSE PRACTITIONER

## 2020-12-01 PROCEDURE — 3008F PR BODY MASS INDEX (BMI) DOCUMENTED: ICD-10-PCS | Mod: HCNC,CPTII,S$GLB, | Performed by: NURSE PRACTITIONER

## 2020-12-01 PROCEDURE — 99999 PR PBB SHADOW E&M-EST. PATIENT-LVL IV: CPT | Mod: PBBFAC,HCNC,, | Performed by: NURSE PRACTITIONER

## 2020-12-01 PROCEDURE — 99999 PR PBB SHADOW E&M-EST. PATIENT-LVL IV: ICD-10-PCS | Mod: PBBFAC,HCNC,, | Performed by: NURSE PRACTITIONER

## 2020-12-01 PROCEDURE — 1159F MED LIST DOCD IN RCRD: CPT | Mod: HCNC,S$GLB,, | Performed by: NURSE PRACTITIONER

## 2020-12-01 PROCEDURE — 1101F PR PT FALLS ASSESS DOC 0-1 FALLS W/OUT INJ PAST YR: ICD-10-PCS | Mod: HCNC,CPTII,S$GLB, | Performed by: NURSE PRACTITIONER

## 2020-12-01 PROCEDURE — 1125F PR PAIN SEVERITY QUANTIFIED, PAIN PRESENT: ICD-10-PCS | Mod: HCNC,S$GLB,, | Performed by: NURSE PRACTITIONER

## 2020-12-01 PROCEDURE — 1125F AMNT PAIN NOTED PAIN PRSNT: CPT | Mod: HCNC,S$GLB,, | Performed by: NURSE PRACTITIONER

## 2020-12-01 PROCEDURE — 3078F DIAST BP <80 MM HG: CPT | Mod: HCNC,CPTII,S$GLB, | Performed by: NURSE PRACTITIONER

## 2020-12-01 PROCEDURE — 80307 DRUG TEST PRSMV CHEM ANLYZR: CPT | Mod: HCNC

## 2020-12-01 PROCEDURE — 99214 OFFICE O/P EST MOD 30 MIN: CPT | Mod: HCNC,S$GLB,, | Performed by: NURSE PRACTITIONER

## 2020-12-01 PROCEDURE — 1101F PT FALLS ASSESS-DOCD LE1/YR: CPT | Mod: HCNC,CPTII,S$GLB, | Performed by: NURSE PRACTITIONER

## 2020-12-01 PROCEDURE — 99499 RISK ADDL DX/OHS AUDIT: ICD-10-PCS | Mod: S$GLB,,, | Performed by: NURSE PRACTITIONER

## 2020-12-01 PROCEDURE — 99214 PR OFFICE/OUTPT VISIT, EST, LEVL IV, 30-39 MIN: ICD-10-PCS | Mod: HCNC,S$GLB,, | Performed by: NURSE PRACTITIONER

## 2020-12-01 PROCEDURE — 3288F FALL RISK ASSESSMENT DOCD: CPT | Mod: HCNC,CPTII,S$GLB, | Performed by: NURSE PRACTITIONER

## 2020-12-01 PROCEDURE — 99499 UNLISTED E&M SERVICE: CPT | Mod: S$GLB,,, | Performed by: NURSE PRACTITIONER

## 2020-12-01 PROCEDURE — 3078F PR MOST RECENT DIASTOLIC BLOOD PRESSURE < 80 MM HG: ICD-10-PCS | Mod: HCNC,CPTII,S$GLB, | Performed by: NURSE PRACTITIONER

## 2020-12-01 RX ORDER — OXYCODONE AND ACETAMINOPHEN 5; 325 MG/1; MG/1
1 TABLET ORAL EVERY 8 HOURS PRN
Qty: 90 TABLET | Refills: 0 | Status: SHIPPED | OUTPATIENT
Start: 2020-12-01 | End: 2020-12-30 | Stop reason: SDUPTHER

## 2020-12-01 NOTE — PROGRESS NOTES
"Chronic patient Established Note (Follow up visit)    HPI:     Ca Coats is a 69 y.o. female who presents today with left knee pain. Her pain has been going on for "too long."  She was previously treated by Dr. Iyer at [facility].  She has injections [id number] with him.  These were helpful, but she does not know if the injections were steroid or viscosupplementation.   This pain is described in detail below.     Interval History (4/30/2018):  The patient returns to clinic today for follow up and records review. We did received records from [facility] including a MRI of her knee. Dr. Iyer's last office visit note from January 2017 does not include any previous injections. She continues to report bilateral knee pain, left greater than right. She describes this pain as constant and aching. This pain is worse with prolonged walking. She also report right shoulder pain with prolonged overhead activity. She continues to take Percocet with benefit. She denies any other health changes.     Interval History (5/31/2018):  The patient returns to clinic for follow up. She is s/p left knee Synvisc One injection on 5/8/2018. She reports 35% relief of her knee pain. She continues to report bilateral knee pain, left greater than right. She describes this pain as constant and aching. Her pain is worse with prolonged walking and standing. She is scheduled to see Orthopedics at the  End of this month. She is also following up with Bariatrics to discuss the lap band procedure. She continues to take Percocet with benefit. She also uses Voltaren gel with benefit but this is expensive for her. She denies any other health changes.          Interval History (7/2/2018):  The patient returns to clinic today for follow up. She continues to report left knee pain that is constant, sharp, and aching in nature. Her pain is worse with prolonged walking and standing. She is scheduled for weight loss surgery in August. She continues " "follow up with orthopedics who does not recommend surgery at this time due to her BMI. She continues to take Percocet as needed with benefit. She denies any other health changes.      Interval History (7/19/2018):  The patient returns to clinic today for follow up. She is s/p left genicular nerve block on 7/10/2018. She reports 80% relief of her left knee pain. She reports that she was able to walk for longer distances (3 blocks) after the block. Her pain has returned to baseline. She continues to report left knee pain that is constant, sharp, and aching in nature. Her pain is worse with prolonged walking and standing. She denies any other health changes.      Interval History (8/21/2018):  The patient returns to clinic today for follow up. She is s/p left genicular cooled RFA on 7/31/2018. She reports 60% relief of her left knee pain. She reports intermittent knee pain that is sore and aching in nature. She continues to perform a home exercise routine. She is actively trying to lose weight. She continues to follow up with Bariatric Surgery at [facility]. She is considering weight loss surgery. She continues to use compound cream with benefit. She denies any other health changes.      Interval History (11/21/2018):  The patient returns to clinic today for follow up. She reports increased left knee pain this past week. She reports 2 episodes where her knee has "locked" up on her while walking. She describes her knee pain as sore and aching. She is actively trying to lose weight and quit smoking. She continues to use the compound cream with benefit. She denies any other health changes.      Interval History (1/9/2019):  The patient returns to clinic today for follow up. She is s/p left knee steroid injection on 12/21/2018. She reports one day of relief. She continues to report left knee pain that is constant, sharp, and aching in nature. Her pain is worse with standing and walking. She reports that her knee "locks" up on " her while walking. She often feels as though she is going to fall. She is scheduled for bariatric weight loss surgery in February at [facility]. She denies any other health changes.      Interval History (2/28/2019):  The patient returns to clinic today for follow up. She is s/p Euflexxa series completed on 1/30/2019. She reports that she able to stand for longer periods of time since the procedure. She continues to report left knee pain that is sore and aching in nature. Her pain is worse with prolonged standing and walking. She was previously scheduled for weight loss surgery but reports this was canceled. She is scheduled for follow up next week about this. She denies any other health changes.      Interval History (4/12/2019):  The patient returns for f/u of left knee pain.  She is s/p left genicular cooled RFA with about 90% pain relief.  She has been able to walk easier.  She also notices less stiffness in her knee.  She is planning on gastric surgery prior to knee replacement.  She had benefit with compounding cream but it is no longer covered by her insurance.  She does take Percocet from her PCP.  Her pain today is 7/10.     Interval History (7/11/19):  Patient reported to ED today for increased L knee pain and an episode of LLE weakness that lead to a near fall. Patient is s/p L genicular RFA in 3/19/19 that provided 90% relief for 2 months then came back. Pain is a crunchy pain, in left knee, that does not radiate, worse with movement, better with rest. Pain today is 9/10. She normally uses cane to ambulate but is currently using wheelchair at hospital. Denies any trauma ot the area, fever/chills, n/v, abdominal pain, or HA.     Interval History (8/8/2019):  She returns today for follow up s/p left knee genicular chemodenervation with phenol 7/19/19.  She reports that this procedure did not provide any improvement in her left knee symptoms, and she is still having significant difficulty with ambulation, still  uses wheelchair for mobility. She also reports left lateral thigh and hip pain lateral upper thigh numbness. She has been admitted to [facility] but is not receiving PT there. She is still considering lap band surgery but has missed a followup appointment.      Interval History (9/4/2019):  The patient returns to clinic today for follow up. She continues to report intermittent left lateral thigh and hip pain that is tingling and numb in nature. She continues to report left knee pain. She continues to have difficulty ambulating due to pain. She is currently living in a SNF. She continues to take Gabapentin 300 mg BID. This was increased at last visit but not increased at the nursing home. She does report benefit with Percocet though it only last approximately 4-5 hours. She denies any adverse effects. She does present with a care attendant from the SNF today. Her pain today is 10/10.     Interval History (10/4/2019):  The patient returns to clinic today for follow up and medication refill. She continues to report bilateral knee pain that is aching in nature. She reports intermittent left lateral thigh pain that is tingling and shooting in nature. She reports that her pain has been improving since last visit. She has increased her activity. She continues to report benefit with Gabapentin. She continues to take Percocet as needed with benefit. She denies any other health changes. Her pain today is 0/10.     Interval History (1/6/2020):  The patient returns to clinic today for follow up. She reports increased left leg pain today that is tingling in nature. She continues to report bilateral knee pain, left greater than right. She describes this pain as constant, sharp, and aching. She is no longer living at the nursing facility. She has increased her home exercise routine. She is actively trying to quit smoking in order to move forward with bariatric surgery. She continues to report benefit with current  medication regimen. She takes Gabapentin and Percocet with benefit and without adverse effect. She denies any other health changes. Her pain today is 7/10.     Interval History (4/8/2020):  She returns today for follow up via audio.  She reports that the methocarbamol is not helping.  She continues to have a left-sided low back pain that is bothering her.  Her left knee continues to hurt.  Her right knee is hurting a little as well.  Percocet continues to help, but it is not lasting long enough.     Interval History (5/1/2020):  The patient returns for audio visit today for follow up. She continues to report bilateral knee pain, left greater than right. Her pain is worse with prolonged standing and walking. She reports that her back pain has improved. She continues to report benefit with current medication regimen. She continues to take Gabapentin and Percocet with benefit and without adverse effects. She denies any other health changes. Her pain today is 8/10.     Interval History (6/2/2020):  The patient returns to clinic today via audio visit for follow up of knee pain. She reports increased left knee pain in the last week. Her pain is worse with standing and walking. She feels as though she will fall when standing. She is currently using ice, heat, and OTC lidocaine patches with limited relief. She continues to take Gabapentin and Percocet with some benefit. She denies any adverse effects. She denies any other health changes. Her pain today is 10/10.     Interval History 7/8/2020:  Ca Coats presents to the clinic for a follow-up appointment for follow up after 6/19/20 RFA Since the last visit, Ca Coats states the pain has been persistant. Current pain intensity is 10/10.    Interval History 8/11/2020:  The patient returns to clinic today for follow up of knee pain. She is s/p left knee Orthovisc injection on 7/28/2020. She reports limited relief at this time. She continues to  report left knee pain, especially with standing and walking. She feels as though her knee will buckle. She has recently quit smoking. She is actively trying to lose weight. She was recently started on Ozempic per her PCP. She is following her diet plan. She continues to take Percocet with benefit but reports that it does not last. She denies any adverse effects with this medication. She denies any other health changes. Her pain today is 8/10.    Interval History 9/3/2020:  The patient returns to clinic today for follow up of knee pain. She reports some relief with left knee Orthovisc injection in July. She continues to report left knee pain that is sharp in nature. This pain is worse with standing and walking. She feels as though her knee will give out with prolonged standing. She is actively trying to lose weight but is frustrated with lack of progress. She is following her diet plan. She continues to Percocet with benefit but it does not last. She denies any adverse effects. She denies any other health changes. Her pain today is 7/10.    Interval History 12/1/2020:  The patient returns to clinic today for follow up of knee pain. She reports increased left knee pain over the last few weeks. She also reports that her left knee feels weaker over the last month. She feels as thought it will give out with standing and walking. She would like to repeat RFA as she feels this helps her feel more stable and decreases her pain. She continues to take Percocet with some benefit but asks if this can be stronger. She denies any adverse effects with this medication. She continues to follow up with Bariatrics. She continues to follow a diet plan and take Ozempic. She denies any other health changes. Her pain today is 10/10.    Pain Disability Index Review:  Last 3 PDI Scores 12/1/2020 9/3/2020 8/11/2020   Pain Disability Index (PDI) 50 62 56         Physical Therapy: Yes, she did this in 2018 for one month (twice a week). She  does HEP (stretching and ROM in the morning)      Pain Medications:    Current:  · Gabapentin, tylenol 500mg Q6 prn, Percocet 5/325 mg TID PRN    Opioid Contract: yes     report:  Reviewed and consistent with medication use as prescribed.    Pain Procedures:   · 5/8/2018- Left knee Synvisc One injection  · 7/10/2018- Left genicular nerve block  · 7/31/2018- Left genicular cooled RFA  · 12/21/2018- Left knee steroid injection  · 1/31/2019- Left knee Euflexxa series  · 3/19/2019 Left genicular cooled RFA- 90% relief  · 7/19/19- Left knee genicular phenol chemodenervation-     Physical Therapy/Home Exercise: yes, she did this in 2018 for one month (twice a week). She does HEP (stretching and ROM in the morning)    Imaging:   MRI Left Knee 7/21/2017 (in media):  The medial meniscus is intact. The lateral meniscus is intact.      A chronic complete ACL tear is noted. The posterior cruciate ligament is intact. The medial and lateral retinacula are intact. The medial collateral ligament is intact. The lateral collateral ligament in intact. The posterolateral corner structures are intact.      There is full thickness fissuring of the central aspect medial femoral cartilage. The lateral tibiofemoral compartment cartilage is intact. There is broad thickness defect within the central trochlear cartilage. There is mild lateral patellofemoral cartilage thinning and regional full thickness loss of the central superior patellar cartilage.      A small effusion is present. There is trace infrapatellar fluid. Tiny popliteal cyst is noted. Subtle focal marrow edema is seen within the posterior tibial plateau. The bone marrow signal is heterogeneous likely reflecting marrow reconversion.      There is trace pes anserine bursitis. The tendons are otherwise normal.      Grade 2 fatty streaking of the semimembranosus and vastus lateralis muscles are noted. There is mild prepatellar edema.      MRI Right Shoulder 7/21/2017 (in media):    There is a complete tear of the supraspinatus tendon with retraction to the glenohumeral joint. There is partial tearing of the anterior infraspinatus articular surface. Mild subscapularis tendinosis is noted. The teres minor is intact. A small amount of subacromial/subdeltoid fluid is noted. The proximal long head biceps tendon is poorly visualized proximally although intact distally.      Degenerative tearing of the anterosuperior through posterosuperior labrum is noted. There is moderate to severe superior glenoid cartilage thinning with subchondral degenerative changes. The glenohumeral ligaments appear grossly intact.      An os acromiale is seen with fluid and osteophytosis at its junction with the base of the acromion. There is moderate superior subluxation of the AC joint with mild osteophytosis.      Small effusion with subcoracoid extension is noted.      There is grade 2/3 fatty atrophy of the supraspinatus muscle, grade 2 of the infraspinatus and subscapularis. Heterogeneous signal is seen throughout the bone marrow likely reflecting marrow reconversion.        Allergies: Review of patient's allergies indicates:  No Known Allergies    Current Medications:   Current Outpatient Medications   Medication Sig Dispense Refill    acetaminophen (TYLENOL) 500 MG tablet       albuterol (PROAIR HFA) 90 mcg/actuation inhaler Inhale 2 puffs into the lungs every 6 (six) hours as needed for Wheezing. Rescue 18 g 2    amLODIPine (NORVASC) 10 MG tablet TAKE 1 TABLET EVERY DAY 90 tablet 1    atorvastatin (LIPITOR) 40 MG tablet Take 1 tablet (40 mg total) by mouth once daily. 90 tablet 3    B-complex with vitamin C (Z-BEC OR EQUIV) tablet Take 1 tablet by mouth once daily.       CALCIUM CITRATE-VITAMIN D2 ORAL Take 1 tablet by mouth once daily.       ferrous gluconate (FERGON) 324 MG tablet Take 1 tablet by mouth once daily.      gabapentin (NEURONTIN) 300 MG capsule Take 2 capsules (600 mg total) by mouth 3  (three) times daily. 540 capsule 1    lisinopriL (PRINIVIL,ZESTRIL) 40 MG tablet TAKE 1 TABLET EVERY DAY 90 tablet 1    multivitamin with minerals tablet Take 1 tablet by mouth once daily.       omega 3-dha-epa-fish oil 1,000 mg (120 mg-180 mg) Cap       oxyCODONE-acetaminophen (PERCOCET) 5-325 mg per tablet Take 1 tablet by mouth every 8 (eight) hours as needed for Pain. 90 tablet 0    pantoprazole (PROTONIX) 40 MG tablet Take 1 tablet (40 mg total) by mouth once daily. 90 tablet 1    diclofenac sodium (VOLTAREN) 1 % Gel Voltaren 1 % topical gel       Current Facility-Administered Medications   Medication Dose Route Frequency Provider Last Rate Last Dose    sodium hyaluronate (EUFLEXXA) 10 mg/mL(mw 2.4 -3.6 million) injection 20 mg  20 mg Intra-articular 1 time in Clinic/FREIDA Clark MD        sodium hyaluronate (EUFLEXXA) 10 mg/mL(mw 2.4 -3.6 million) injection 20 mg  20 mg Intra-articular 1 time in Clinic/FREIDA Clark MD        sodium hyaluronate (EUFLEXXA) 10 mg/mL(mw 2.4 -3.6 million) injection 20 mg  20 mg Intra-articular 1 time in [facility]/FREIDA Clark MD        sodium hyaluronate (EUFLEXXA) 10 mg/mL(mw 2.4 -3.6 million) injection 20 mg  20 mg Intra-articular 1 time in [facility]/FREIDA Clark MD           REVIEW OF SYSTEMS:    GENERAL:  No weight loss, malaise or fevers.  HEENT:  Negative for frequent or significant headaches.  NECK:  Negative for lumps, goiter, pain and significant neck swelling.  RESPIRATORY:  Negative for cough, wheezing or shortness of breath. +LAURA  CARDIOVASCULAR:  Negative for chest pain, leg swelling or palpitations.  GI:  Negative for abdominal discomfort, blood in stools or black stools or change in bowel habits. +GERD  MUSCULOSKELETAL:  + Left knee pain  SKIN:  Negative for lesions, rash, and itching.  PSYCH:  Positive for  mood disorder and recent psychosocial stressors. +sleep disturbance  HEMATOLOGY/LYMPHOLOGY:  Negative for prolonged bleeding,  bruising easily or swollen nodes.  NEURO:   No history of headaches, syncope, paralysis, seizures or tremors.  All other reviewed and negative other than HPI.    Past Medical History:  Past Medical History:   Diagnosis Date    Anemia     2018    Arthritis     BMI 60.0-69.9, adult     Cataract     Depression     Dry eye syndrome     GERD (gastroesophageal reflux disease)     Glaucoma suspect     History of gastric ulcer     Hyperlipidemia     Hypertension     Hypothyroidism     Morbid obesity     Neuromuscular disorder     Thyroid disease        Past Surgical History:  Past Surgical History:   Procedure Laterality Date    APPENDECTOMY      CATARACT EXTRACTION W/  INTRAOCULAR LENS IMPLANT Bilateral     MFIOL OU    INJECTION OF ANESTHETIC AGENT AROUND NERVE Left 7/10/2018    Procedure: BLOCK, NERVE;  Surgeon: Samantha Vidal MD;  Location: [facility];  Service: Pain Management;  Laterality: Left;  Genicular     INJECTION OF ANESTHETIC AGENT AROUND NERVE Left 7/19/2019    Procedure: Block, Nerve NERVE BLOCK GENICULAR WITH PHENOL 6%;  Surgeon: Samantha Vidal MD;  Location: [facility];  Service: Pain Management;  Laterality: Left;  NEEDS CONSENT    RADIOFREQUENCY ABLATION Left 6/19/2020    Procedure: RADIOFREQUENCY ABLATION LEFT GENICULAR;  Surgeon: Tevin Clark MD;  Location: [facility];  Service: Pain Management;  Laterality: Left;  Left Genicular RFA       Family History:  Family History   Problem Relation Age of Onset    No Known Problems Mother     No Known Problems Father        Social History:  Social History     Socioeconomic History    Marital status: Single     Spouse name: Not on file    Number of children: 2    Years of education: Not on file    Highest education level: Not on file   Occupational History     Comment: retired  and cook   Social Needs    Financial resource strain: Not on file    Food insecurity     Worry: Not on file     Inability: Not on  "file    Transportation needs     Medical: Not on file     Non-medical: Not on file   Tobacco Use    Smoking status: Former Smoker     Packs/day: 0.25     Years: 50.00     Pack years: 12.50     Types: Cigarettes     Quit date: 2020     Years since quittin.9    Smokeless tobacco: Never Used   Substance and Sexual Activity    Alcohol use: Not Currently    Drug use: No    Sexual activity: Not Currently     Partners: Male   Lifestyle    Physical activity     Days per week: Not on file     Minutes per session: Not on file    Stress: Not on file   Relationships    Social connections     Talks on phone: Not on file     Gets together: Not on file     Attends Restorationism service: Not on file     Active member of club or organization: Not on file     Attends meetings of clubs or organizations: Not on file     Relationship status: Not on file   Other Topics Concern    Not on file   Social History Narrative    Not on file       OBJECTIVE:    BP 98/67   Pulse 69   Temp 98.1 °F (36.7 °C)   Resp 18   Ht 5' 6" (1.676 m)   Wt (!) 167 kg (368 lb 2.7 oz)   SpO2 100%   BMI 59.42 kg/m²     PHYSICAL EXAMINATION:    General appearance: Well appearing, in no acute distress, alert and oriented x3.  Psych:  Mood and affect appropriate.  Skin: Skin color, texture, turgor normal, no rashes or lesions, in both upper and lower body.  Head/face:  Atraumatic, normocephalic.   Pulm: Symmetric chest rise, no respiratory distress noted.   GI: Abdomen soft and non-tender.  Extremities:  No deformities, edema, or skin discoloration. Good capillary refill.  Musculoskeletal: There is pain with palpation over medial and lateral joint line of left knee. Significantly limited ROM with pain on flexion and extension of left knee. Crepitus noted to left knee.  Bilateral lower extremity strength is diminished to left leg due to pain today.  No atrophy or tone abnormalities are noted.  Neuro:  Plantar response are downgoing. No loss of " sensation is noted.  Gait: Antalgic- ambulates with wheelchair.     ASSESSMENT: 69 y.o. year old female with left knee pain, consistent with the followin. Chronic pain disorder     2. Chronic pain of left knee     3. Primary osteoarthritis of left knee     4. Encounter for monitoring opioid maintenance therapy           PLAN:     - Previous imaging was reviewed and discussed with the patient today.    - Schedule for left genicular RFA.     - Continue follow up with Bariatrics regarding weight loss.     - Pain contract signed 2020.    - Continue Percocet 5/325 mg TID PRN. Refill provided with appropriate date. We discussed that any increase will need to be discussed with Dr. Clark.     - The patient is here today for a refill of current pain medications and they believe these provide effective pain control and improvements in quality of life.  The patient notes no serious side effects, and feels the benefits outweigh the risks.  The patient was reminded of the pain contract that they signed previously as well as the risks and benefits of the medication including possible death.  The updated [place] Board of [employer] prescription monitoring program was reviewed, and the patient has been found to be compliant with current treatment plan.    - Blood drug screen today, as patient is unable to stand for prolonged periods today.     - I have stressed the importance of physical activity and a home exercise plan to help with pain and improve health.    - RTC 3 weeks after above procedure.     - Dr. Clark was consulted on the patient and agrees with this plan.    The above plan and management options were discussed at length with patient. Patient is in agreement with the above and verbalized understanding.    Savanna Hyatt NP  2020

## 2020-12-02 ENCOUNTER — IMMUNIZATION (OUTPATIENT)
Dept: PHARMACY | Facility: CLINIC | Age: 70
End: 2020-12-02
Payer: MEDICARE

## 2020-12-03 ENCOUNTER — LAB VISIT (OUTPATIENT)
Dept: LAB | Facility: HOSPITAL | Age: 70
End: 2020-12-03
Payer: MEDICARE

## 2020-12-03 ENCOUNTER — TELEPHONE (OUTPATIENT)
Dept: INTERNAL MEDICINE | Facility: CLINIC | Age: 70
End: 2020-12-03

## 2020-12-03 ENCOUNTER — OFFICE VISIT (OUTPATIENT)
Dept: BARIATRICS | Facility: CLINIC | Age: 70
End: 2020-12-03
Payer: MEDICARE

## 2020-12-03 VITALS
HEART RATE: 81 BPM | DIASTOLIC BLOOD PRESSURE: 64 MMHG | WEIGHT: 293 LBS | BODY MASS INDEX: 59.03 KG/M2 | OXYGEN SATURATION: 91 % | SYSTOLIC BLOOD PRESSURE: 120 MMHG

## 2020-12-03 DIAGNOSIS — D50.9 IRON DEFICIENCY ANEMIA, UNSPECIFIED IRON DEFICIENCY ANEMIA TYPE: ICD-10-CM

## 2020-12-03 DIAGNOSIS — G47.33 OBSTRUCTIVE SLEEP APNEA: ICD-10-CM

## 2020-12-03 DIAGNOSIS — E78.2 MIXED HYPERLIPIDEMIA: Chronic | ICD-10-CM

## 2020-12-03 DIAGNOSIS — M19.90 OSTEOARTHRITIS, UNSPECIFIED OSTEOARTHRITIS TYPE, UNSPECIFIED SITE: ICD-10-CM

## 2020-12-03 DIAGNOSIS — E55.9 VITAMIN D DEFICIENCY, UNSPECIFIED: ICD-10-CM

## 2020-12-03 DIAGNOSIS — E66.01 CLASS 3 SEVERE OBESITY DUE TO EXCESS CALORIES WITH SERIOUS COMORBIDITY AND BODY MASS INDEX (BMI) OF 60.0 TO 69.9 IN ADULT: Primary | ICD-10-CM

## 2020-12-03 DIAGNOSIS — R79.9 ABNORMAL FINDING OF BLOOD CHEMISTRY, UNSPECIFIED: ICD-10-CM

## 2020-12-03 DIAGNOSIS — F32.9 MAJOR DEPRESSIVE DISORDER, REMISSION STATUS UNSPECIFIED, UNSPECIFIED WHETHER RECURRENT: ICD-10-CM

## 2020-12-03 DIAGNOSIS — F11.20 CHRONIC NARCOTIC DEPENDENCE: ICD-10-CM

## 2020-12-03 DIAGNOSIS — Z00.00 PREVENTATIVE HEALTH CARE: ICD-10-CM

## 2020-12-03 DIAGNOSIS — I10 ESSENTIAL HYPERTENSION: ICD-10-CM

## 2020-12-03 DIAGNOSIS — K21.9 GASTROESOPHAGEAL REFLUX DISEASE WITHOUT ESOPHAGITIS: ICD-10-CM

## 2020-12-03 DIAGNOSIS — G89.29 OTHER CHRONIC PAIN: ICD-10-CM

## 2020-12-03 LAB
25(OH)D3+25(OH)D2 SERPL-MCNC: 63 NG/ML (ref 30–96)
ALBUMIN SERPL BCP-MCNC: 3.5 G/DL (ref 3.5–5.2)
ALP SERPL-CCNC: 92 U/L (ref 55–135)
ALT SERPL W/O P-5'-P-CCNC: 11 U/L (ref 10–44)
ANION GAP SERPL CALC-SCNC: 9 MMOL/L (ref 8–16)
AST SERPL-CCNC: 14 U/L (ref 10–40)
BASOPHILS # BLD AUTO: 0.04 K/UL (ref 0–0.2)
BASOPHILS NFR BLD: 0.3 % (ref 0–1.9)
BILIRUB SERPL-MCNC: 0.5 MG/DL (ref 0.1–1)
BUN SERPL-MCNC: 20 MG/DL (ref 8–23)
CALCIUM SERPL-MCNC: 9.5 MG/DL (ref 8.7–10.5)
CHLORIDE SERPL-SCNC: 111 MMOL/L (ref 95–110)
CHOLEST SERPL-MCNC: 86 MG/DL (ref 120–199)
CHOLEST/HDLC SERPL: 2.2 {RATIO} (ref 2–5)
CO2 SERPL-SCNC: 24 MMOL/L (ref 23–29)
CREAT SERPL-MCNC: 0.8 MG/DL (ref 0.5–1.4)
DIFFERENTIAL METHOD: ABNORMAL
EOSINOPHIL # BLD AUTO: 0.1 K/UL (ref 0–0.5)
EOSINOPHIL NFR BLD: 1 % (ref 0–8)
ERYTHROCYTE [DISTWIDTH] IN BLOOD BY AUTOMATED COUNT: 12.2 % (ref 11.5–14.5)
EST. GFR  (AFRICAN AMERICAN): >60 ML/MIN/1.73 M^2
EST. GFR  (NON AFRICAN AMERICAN): >60 ML/MIN/1.73 M^2
ESTIMATED AVG GLUCOSE: 85 MG/DL (ref 68–131)
FERRITIN SERPL-MCNC: 102 NG/ML (ref 20–300)
GLUCOSE SERPL-MCNC: 94 MG/DL (ref 70–110)
HBA1C MFR BLD HPLC: 4.6 % (ref 4–5.6)
HCT VFR BLD AUTO: 39.8 % (ref 37–48.5)
HDLC SERPL-MCNC: 39 MG/DL (ref 40–75)
HDLC SERPL: 45.3 % (ref 20–50)
HGB BLD-MCNC: 12 G/DL (ref 12–16)
IMM GRANULOCYTES # BLD AUTO: 0.02 K/UL (ref 0–0.04)
IMM GRANULOCYTES NFR BLD AUTO: 0.2 % (ref 0–0.5)
LDLC SERPL CALC-MCNC: 31 MG/DL (ref 63–159)
LYMPHOCYTES # BLD AUTO: 3.2 K/UL (ref 1–4.8)
LYMPHOCYTES NFR BLD: 27.6 % (ref 18–48)
MCH RBC QN AUTO: 31.7 PG (ref 27–31)
MCHC RBC AUTO-ENTMCNC: 30.2 G/DL (ref 32–36)
MCV RBC AUTO: 105 FL (ref 82–98)
MONOCYTES # BLD AUTO: 0.8 K/UL (ref 0.3–1)
MONOCYTES NFR BLD: 6.5 % (ref 4–15)
NEUTROPHILS # BLD AUTO: 7.5 K/UL (ref 1.8–7.7)
NEUTROPHILS NFR BLD: 64.4 % (ref 38–73)
NONHDLC SERPL-MCNC: 47 MG/DL
NRBC BLD-RTO: 0 /100 WBC
PLATELET # BLD AUTO: 257 K/UL (ref 150–350)
PMV BLD AUTO: 10.7 FL (ref 9.2–12.9)
POTASSIUM SERPL-SCNC: 4.3 MMOL/L (ref 3.5–5.1)
PROT SERPL-MCNC: 6.9 G/DL (ref 6–8.4)
RBC # BLD AUTO: 3.79 M/UL (ref 4–5.4)
SODIUM SERPL-SCNC: 144 MMOL/L (ref 136–145)
TRIGL SERPL-MCNC: 80 MG/DL (ref 30–150)
TSH SERPL DL<=0.005 MIU/L-ACNC: 0.81 UIU/ML (ref 0.4–4)
WBC # BLD AUTO: 11.66 K/UL (ref 3.9–12.7)

## 2020-12-03 PROCEDURE — 3288F FALL RISK ASSESSMENT DOCD: CPT | Mod: HCNC,CPTII,S$GLB, | Performed by: STUDENT IN AN ORGANIZED HEALTH CARE EDUCATION/TRAINING PROGRAM

## 2020-12-03 PROCEDURE — 80061 LIPID PANEL: CPT | Mod: HCNC

## 2020-12-03 PROCEDURE — 99999 PR PBB SHADOW E&M-EST. PATIENT-LVL IV: ICD-10-PCS | Mod: PBBFAC,HCNC,, | Performed by: STUDENT IN AN ORGANIZED HEALTH CARE EDUCATION/TRAINING PROGRAM

## 2020-12-03 PROCEDURE — 99999 PR PBB SHADOW E&M-EST. PATIENT-LVL IV: CPT | Mod: PBBFAC,HCNC,, | Performed by: STUDENT IN AN ORGANIZED HEALTH CARE EDUCATION/TRAINING PROGRAM

## 2020-12-03 PROCEDURE — 3078F PR MOST RECENT DIASTOLIC BLOOD PRESSURE < 80 MM HG: ICD-10-PCS | Mod: HCNC,CPTII,S$GLB, | Performed by: STUDENT IN AN ORGANIZED HEALTH CARE EDUCATION/TRAINING PROGRAM

## 2020-12-03 PROCEDURE — 3074F PR MOST RECENT SYSTOLIC BLOOD PRESSURE < 130 MM HG: ICD-10-PCS | Mod: HCNC,CPTII,S$GLB, | Performed by: STUDENT IN AN ORGANIZED HEALTH CARE EDUCATION/TRAINING PROGRAM

## 2020-12-03 PROCEDURE — 82728 ASSAY OF FERRITIN: CPT | Mod: HCNC

## 2020-12-03 PROCEDURE — 3008F BODY MASS INDEX DOCD: CPT | Mod: HCNC,CPTII,S$GLB, | Performed by: STUDENT IN AN ORGANIZED HEALTH CARE EDUCATION/TRAINING PROGRAM

## 2020-12-03 PROCEDURE — 1101F PR PT FALLS ASSESS DOC 0-1 FALLS W/OUT INJ PAST YR: ICD-10-PCS | Mod: HCNC,CPTII,S$GLB, | Performed by: STUDENT IN AN ORGANIZED HEALTH CARE EDUCATION/TRAINING PROGRAM

## 2020-12-03 PROCEDURE — 1159F PR MEDICATION LIST DOCUMENTED IN MEDICAL RECORD: ICD-10-PCS | Mod: HCNC,S$GLB,, | Performed by: STUDENT IN AN ORGANIZED HEALTH CARE EDUCATION/TRAINING PROGRAM

## 2020-12-03 PROCEDURE — 99213 PR OFFICE/OUTPT VISIT, EST, LEVL III, 20-29 MIN: ICD-10-PCS | Mod: HCNC,S$GLB,, | Performed by: STUDENT IN AN ORGANIZED HEALTH CARE EDUCATION/TRAINING PROGRAM

## 2020-12-03 PROCEDURE — 1159F MED LIST DOCD IN RCRD: CPT | Mod: HCNC,S$GLB,, | Performed by: STUDENT IN AN ORGANIZED HEALTH CARE EDUCATION/TRAINING PROGRAM

## 2020-12-03 PROCEDURE — 1101F PT FALLS ASSESS-DOCD LE1/YR: CPT | Mod: HCNC,CPTII,S$GLB, | Performed by: STUDENT IN AN ORGANIZED HEALTH CARE EDUCATION/TRAINING PROGRAM

## 2020-12-03 PROCEDURE — 3078F DIAST BP <80 MM HG: CPT | Mod: HCNC,CPTII,S$GLB, | Performed by: STUDENT IN AN ORGANIZED HEALTH CARE EDUCATION/TRAINING PROGRAM

## 2020-12-03 PROCEDURE — 83036 HEMOGLOBIN GLYCOSYLATED A1C: CPT | Mod: HCNC

## 2020-12-03 PROCEDURE — 80053 COMPREHEN METABOLIC PANEL: CPT | Mod: HCNC

## 2020-12-03 PROCEDURE — 3288F PR FALLS RISK ASSESSMENT DOCUMENTED: ICD-10-PCS | Mod: HCNC,CPTII,S$GLB, | Performed by: STUDENT IN AN ORGANIZED HEALTH CARE EDUCATION/TRAINING PROGRAM

## 2020-12-03 PROCEDURE — 85025 COMPLETE CBC W/AUTO DIFF WBC: CPT | Mod: HCNC

## 2020-12-03 PROCEDURE — 84443 ASSAY THYROID STIM HORMONE: CPT | Mod: HCNC

## 2020-12-03 PROCEDURE — 1125F PR PAIN SEVERITY QUANTIFIED, PAIN PRESENT: ICD-10-PCS | Mod: HCNC,S$GLB,, | Performed by: STUDENT IN AN ORGANIZED HEALTH CARE EDUCATION/TRAINING PROGRAM

## 2020-12-03 PROCEDURE — 99213 OFFICE O/P EST LOW 20 MIN: CPT | Mod: HCNC,S$GLB,, | Performed by: STUDENT IN AN ORGANIZED HEALTH CARE EDUCATION/TRAINING PROGRAM

## 2020-12-03 PROCEDURE — 82306 VITAMIN D 25 HYDROXY: CPT | Mod: HCNC

## 2020-12-03 PROCEDURE — 1125F AMNT PAIN NOTED PAIN PRSNT: CPT | Mod: HCNC,S$GLB,, | Performed by: STUDENT IN AN ORGANIZED HEALTH CARE EDUCATION/TRAINING PROGRAM

## 2020-12-03 PROCEDURE — 36415 COLL VENOUS BLD VENIPUNCTURE: CPT | Mod: HCNC

## 2020-12-03 PROCEDURE — 3008F PR BODY MASS INDEX (BMI) DOCUMENTED: ICD-10-PCS | Mod: HCNC,CPTII,S$GLB, | Performed by: STUDENT IN AN ORGANIZED HEALTH CARE EDUCATION/TRAINING PROGRAM

## 2020-12-03 PROCEDURE — 3074F SYST BP LT 130 MM HG: CPT | Mod: HCNC,CPTII,S$GLB, | Performed by: STUDENT IN AN ORGANIZED HEALTH CARE EDUCATION/TRAINING PROGRAM

## 2020-12-03 RX ORDER — ATORVASTATIN CALCIUM 20 MG/1
20 TABLET, FILM COATED ORAL DAILY
Qty: 90 TABLET | Refills: 1 | Status: SHIPPED | OUTPATIENT
Start: 2020-12-03 | End: 2021-02-02

## 2020-12-03 RX ORDER — SEMAGLUTIDE 1.34 MG/ML
0.5 INJECTION, SOLUTION SUBCUTANEOUS
Qty: 1.5 ML | Refills: 0 | Status: SHIPPED | OUTPATIENT
Start: 2020-12-03 | End: 2020-12-25

## 2020-12-03 NOTE — TELEPHONE ENCOUNTER
Spoke with pt . Informed her that her Lipitor was decreased to 20mg Her lab work came back good . Repeat labs in 6 months with new PCP . Thx Griselda

## 2020-12-03 NOTE — TELEPHONE ENCOUNTER
Lab Results   Component Value Date    CHOL 86 (L) 12/03/2020    CHOL 101 (L) 07/28/2020    CHOL 119 (L) 05/13/2019     Lab Results   Component Value Date    HDL 39 (L) 12/03/2020    HDL 49 07/28/2020    HDL 43 05/13/2019     Lab Results   Component Value Date    LDLCALC 31.0 (L) 12/03/2020    LDLCALC 29.6 (L) 07/28/2020    LDLCALC 57.8 (L) 05/13/2019     Lab Results   Component Value Date    TRIG 80 12/03/2020    TRIG 112 07/28/2020    TRIG 91 05/13/2019     Lab Results   Component Value Date    CHOLHDL 45.3 12/03/2020    CHOLHDL 48.5 07/28/2020    CHOLHDL 36.1 05/13/2019   She is high risk off statin given her history; however, control is too tight based on most recent fasting panel.   Change dose to 20 mg daily     SDJ

## 2020-12-03 NOTE — PROGRESS NOTES
Subjective:       Patient ID: Lu Renae is a 69 y.o. female.    Chief Complaint: No chief complaint on file.    Follow-up, appointment #5     Plan for gastric sleeve with Dr. Cleveland     Following Bariatric Dietician  Diet Recall:  Ensure protein shake, [place] Instant breakfast  Yogurt with berries  Turkey meatball and spinach  banana     Physical Activity: seated resistance band exercises     Co-morbidities associated with obesity:   HTN on Norvasc and lisinopril  HLD on Lipitor and fish oil  GERD on Protonix  LAURA on CPAP  Osteoarthritis, DJD  Glaucoma suspect     Weight Loss History (patient unable to use [employer]):  7/7/2020: 394 lbs, BMI 63.73; started metformin 500 mg twice daily  8/5/2020: 393 lbs, BMI 63.52; discontinue metformin, start Ozempic 0.25 mg weekly injection  9/8/2020: 386 lbs, BMI 62.31; Ozempic 0.5 mg weekly injection  10/6/2020: 379 lbs, BMI 61.20; Ozempic 0.5 mg weekly injection  11/3/2020: 370 lbs, BMI 59.85; Ozempic 0.5mg weekly injection  12/3/2020: 365 lbs, BMI 59.03; Ozempic 0.5mg weekly injection    Review of Systems   Constitutional: Negative for chills and fever.   HENT: Negative for sore throat and trouble swallowing.    Eyes: Negative for visual disturbance.   Respiratory: Negative for shortness of breath.    Cardiovascular: Negative for chest pain and palpitations.   Gastrointestinal: Negative for abdominal pain, nausea and vomiting.   Integumentary:  Negative for rash.   Neurological: Negative for dizziness and light-headedness.   Psychiatric/Behavioral: The patient is not nervous/anxious.          Objective:     Results for LU RENAE (MRN [id number]) as of 12/3/2020 14:22   Ref. Range 12/3/2020 12:00   WBC Latest Ref Range: 3.90 - 12.70 K/uL 11.66   RBC Latest Ref Range: 4.00 - 5.40 M/uL 3.79 (L)   Hemoglobin Latest Ref Range: 12.0 - 16.0 g/dL 12.0   Hematocrit Latest Ref Range: 37.0 - 48.5 % 39.8   MCV Latest Ref Range: 82 - 98 fL 105 (H)   MCH Latest  Ref Range: 27.0 - 31.0 pg 31.7 (H)   MCHC Latest Ref Range: 32.0 - 36.0 g/dL 30.2 (L)   RDW Latest Ref Range: 11.5 - 14.5 % 12.2   Platelets Latest Ref Range: 150 - 350 K/uL 257   MPV Latest Ref Range: 9.2 - 12.9 fL 10.7   Gran % Latest Ref Range: 38.0 - 73.0 % 64.4   Lymph % Latest Ref Range: 18.0 - 48.0 % 27.6   Mono % Latest Ref Range: 4.0 - 15.0 % 6.5   Eosinophil % Latest Ref Range: 0.0 - 8.0 % 1.0   Basophil % Latest Ref Range: 0.0 - 1.9 % 0.3   Immature Granulocytes Latest Ref Range: 0.0 - 0.5 % 0.2   Gran # (ANC) Latest Ref Range: 1.8 - 7.7 K/uL 7.5   Lymph # Latest Ref Range: 1.0 - 4.8 K/uL 3.2   Mono # Latest Ref Range: 0.3 - 1.0 K/uL 0.8   Eos # Latest Ref Range: 0.0 - 0.5 K/uL 0.1   Baso # Latest Ref Range: 0.00 - 0.20 K/uL 0.04   Immature Grans (Abs) Latest Ref Range: 0.00 - 0.04 K/uL 0.02   nRBC Latest Ref Range: 0 /100 WBC 0   Differential Method Unknown Automated   Ferritin Latest Ref Range: 20.0 - 300.0 ng/mL 102   Sodium Latest Ref Range: 136 - 145 mmol/L 144   Potassium Latest Ref Range: 3.5 - 5.1 mmol/L 4.3   Chloride Latest Ref Range: 95 - 110 mmol/L 111 (H)   CO2 Latest Ref Range: 23 - 29 mmol/L 24   Anion Gap Latest Ref Range: 8 - 16 mmol/L 9   BUN Latest Ref Range: 8 - 23 mg/dL 20   Creatinine Latest Ref Range: 0.5 - 1.4 mg/dL 0.8   eGFR if non African American Latest Ref Range: >60 mL/min/1.73 m^2 >60.0   eGFR if African American Latest Ref Range: >60 mL/min/1.73 m^2 >60.0   Glucose Latest Ref Range: 70 - 110 mg/dL 94   Calcium Latest Ref Range: 8.7 - 10.5 mg/dL 9.5   Alkaline Phosphatase Latest Ref Range: 55 - 135 U/L 92   PROTEIN TOTAL Latest Ref Range: 6.0 - 8.4 g/dL 6.9   Albumin Latest Ref Range: 3.5 - 5.2 g/dL 3.5   BILIRUBIN TOTAL Latest Ref Range: 0.1 - 1.0 mg/dL 0.5   AST Latest Ref Range: 10 - 40 U/L 14   ALT Latest Ref Range: 10 - 44 U/L 11   Triglycerides Latest Ref Range: 30 - 150 mg/dL 80   Cholesterol Latest Ref Range: 120 - 199 mg/dL 86 (L)   HDL Latest Ref Range: 40 - 75  mg/dL 39 (L)   HDL/Cholesterol Ratio Latest Ref Range: 20.0 - 50.0 % 45.3   LDL Cholesterol External Latest Ref Range: 63.0 - 159.0 mg/dL 31.0 (L)   Non-HDL Cholesterol Latest Units: mg/dL 47   Total Cholesterol/HDL Ratio Latest Ref Range: 2.0 - 5.0  2.2   Vit D, 25-Hydroxy Latest Ref Range: 30 - 96 ng/mL 63   Hemoglobin A1C External Latest Ref Range: 4.0 - 5.6 % 4.6   Estimated Avg Glucose Latest Ref Range: 68 - 131 mg/dL 85   TSH Latest Ref Range: 0.400 - 4.000 uIU/mL 0.812     Vitals:    12/03/20 1058   BP: 120/64   Pulse: 81       Physical Exam  Vitals signs reviewed.   Constitutional:       General: She is not in acute distress.     Appearance: Normal appearance. She is obese. She is not ill-appearing, toxic-appearing or diaphoretic.   HENT:      Head: Normocephalic and atraumatic.   Eyes:      Extraocular Movements: Extraocular movements intact.   Neck:      Musculoskeletal: Normal range of motion.   Cardiovascular:      Rate and Rhythm: Normal rate.   Pulmonary:      Effort: Pulmonary effort is normal. No respiratory distress.   Musculoskeletal: Normal range of motion.      Right lower leg: No edema.      Left lower leg: No edema.   Skin:     General: Skin is warm and dry.   Neurological:      General: No focal deficit present.      Mental Status: She is alert and oriented to person, place, and time.      Gait: Gait normal.   Psychiatric:         Mood and Affect: Mood normal.         Behavior: Behavior normal.         Thought Content: Thought content normal.         Judgment: Judgment normal.         Assessment:       1. Class 3 severe obesity due to excess calories with serious comorbidity and body mass index (BMI) of 60.0 to 69.9 in adult    2. Obstructive sleep apnea    3. Osteoarthritis, unspecified osteoarthritis type, unspecified site    4. Gastroesophageal reflux disease without esophagitis    5. Essential hypertension    6. Mixed hyperlipidemia        Plan:    - Ozempic 0.5 mg weekly  injections     - Following bariatric dietician     - Continue seated resistance band exercises     - Return to clinic in 4 weeks     - Follow-up with bariatric surgery for next steps in the work-up

## 2020-12-04 LAB
AMPHETAMINES SERPL QL: NEGATIVE
BARBITURATES SERPL QL SCN: NEGATIVE
BENZODIAZ SERPL QL SCN: NEGATIVE
BZE SERPL QL: NEGATIVE
CARBOXYTHC SERPL QL SCN: NEGATIVE
ETHANOL SERPL QL SCN: NEGATIVE
METHADONE SERPL QL SCN: NEGATIVE
OPIATES SERPL QL SCN: NEGATIVE
PCP SERPL QL SCN: NEGATIVE
PROPOXYPH SERPL QL: NEGATIVE

## 2020-12-18 ENCOUNTER — TELEPHONE (OUTPATIENT)
Dept: PAIN MEDICINE | Facility: CLINIC | Age: 70
End: 2020-12-18

## 2020-12-18 NOTE — TELEPHONE ENCOUNTER
----- Message from Santino Henry sent at 12/18/2020  1:29 PM CST -----  Name of Who is Calling: LU RENAE What is the request in detail The patient is calling to r/s her procedure that is schedule for today. Please advise Can the clinic reply by [facility]: No What [facility] to [facility]: [phone]

## 2020-12-18 NOTE — TELEPHONE ENCOUNTER
----- Message from Deneen Shah sent at 12/18/2020 12:49 PM CST -----  Regarding: Patient Call Back  Who Called: Johanna ([facility])    What is the request in detail: Would like a call back in regards to patient running late for her procedure due to the computer systems going down. Please contact to further discuss.    Can the clinic reply by  [facility]? No     Best Call Back Number: [phone]

## 2020-12-21 ENCOUNTER — TELEPHONE (OUTPATIENT)
Dept: BARIATRICS | Facility: CLINIC | Age: 70
End: 2020-12-21

## 2020-12-21 NOTE — TELEPHONE ENCOUNTER
----- Message from Byron Bolden PA-C sent at 12/3/2020 11:44 AM CST -----  Regarding: Pre op  Hey guys this pt has reached her pre surgery weight goal ( 367 lbs) weighs 365 now has been seeing Dr Edwards. All other requirements for surgery met. Was on hold due to COVID 19. Scheduled with Dr Cleveland. Would like to know the next steps.     BM

## 2020-12-21 NOTE — TELEPHONE ENCOUNTER
Reviewed chart- based on history of gastric ulcer and thyroid issues (last evaluated in 2018) and tme in workup from initial evaluation, bringing patient in for assessment and review with Dr. Cleveland prior to scheduling surgery.  Rescheduled appt with Dr. Edwards to coincide with appt with Dr Cleveland

## 2020-12-22 RX ORDER — ATORVASTATIN CALCIUM 20 MG/1
20 TABLET, FILM COATED ORAL DAILY
Qty: 90 TABLET | Refills: 1 | OUTPATIENT
Start: 2020-12-22 | End: 2021-12-22

## 2020-12-30 DIAGNOSIS — Z79.891 ENCOUNTER FOR MONITORING OPIOID MAINTENANCE THERAPY: ICD-10-CM

## 2020-12-30 DIAGNOSIS — Z51.81 ENCOUNTER FOR MONITORING OPIOID MAINTENANCE THERAPY: ICD-10-CM

## 2020-12-30 RX ORDER — OXYCODONE AND ACETAMINOPHEN 5; 325 MG/1; MG/1
1 TABLET ORAL EVERY 8 HOURS PRN
Qty: 90 TABLET | Refills: 0 | Status: SHIPPED | OUTPATIENT
Start: 2020-12-30 | End: 2021-01-29 | Stop reason: SDUPTHER

## 2020-12-30 NOTE — TELEPHONE ENCOUNTER
----- Message from Anne Newton sent at 12/30/2020 10:58 AM CST -----  Regarding: Refill  Can the clinic reply in [facility]: No  Please refill the medication(s) listed below. Please call the patient when the prescription(s) is ready for  at this phone number   [phone] Medication #1 -oxyCODONE-acetaminophen (PERCOCET) 5-325 mg per tabletPreferred Pharmacy: [facility] - [address]

## 2020-12-30 NOTE — TELEPHONE ENCOUNTER
Patient requesting refill on percocet  Last office visit 12/01/20   shows last refill on 12/01/20  Patient does have a pain contract on file with [facility]  Patient last UDS 10/04/19 was consistent with current therapy

## 2021-01-06 ENCOUNTER — CLINICAL SUPPORT (OUTPATIENT)
Dept: SMOKING CESSATION | Facility: CLINIC | Age: 71
End: 2021-01-06
Payer: COMMERCIAL

## 2021-01-06 DIAGNOSIS — F17.200 NICOTINE DEPENDENCE: Primary | ICD-10-CM

## 2021-01-06 PROCEDURE — 99407 BEHAV CHNG SMOKING > 10 MIN: CPT | Mod: S$GLB,,,

## 2021-01-06 PROCEDURE — 99407 PR TOBACCO USE CESSATION INTENSIVE >10 MINUTES: ICD-10-PCS | Mod: S$GLB,,,

## 2021-01-11 ENCOUNTER — PATIENT OUTREACH (OUTPATIENT)
Dept: ADMINISTRATIVE | Facility: OTHER | Age: 71
End: 2021-01-11

## 2021-01-11 DIAGNOSIS — Z12.31 BREAST CANCER SCREENING BY MAMMOGRAM: Primary | ICD-10-CM

## 2021-01-13 ENCOUNTER — OFFICE VISIT (OUTPATIENT)
Dept: BARIATRICS | Facility: CLINIC | Age: 71
End: 2021-01-13
Payer: MEDICARE

## 2021-01-13 VITALS
HEART RATE: 76 BPM | OXYGEN SATURATION: 91 % | OXYGEN SATURATION: 91 % | WEIGHT: 293 LBS | DIASTOLIC BLOOD PRESSURE: 64 MMHG | BODY MASS INDEX: 58.29 KG/M2 | BODY MASS INDEX: 58.29 KG/M2 | SYSTOLIC BLOOD PRESSURE: 114 MMHG | WEIGHT: 293 LBS | HEART RATE: 76 BPM | SYSTOLIC BLOOD PRESSURE: 114 MMHG | DIASTOLIC BLOOD PRESSURE: 64 MMHG

## 2021-01-13 DIAGNOSIS — E66.01 CLASS 3 SEVERE OBESITY DUE TO EXCESS CALORIES WITH SERIOUS COMORBIDITY AND BODY MASS INDEX (BMI) OF 60.0 TO 69.9 IN ADULT: Primary | ICD-10-CM

## 2021-01-13 DIAGNOSIS — E66.01 MORBID OBESITY WITH BMI OF 50.0-59.9, ADULT: Primary | ICD-10-CM

## 2021-01-13 DIAGNOSIS — M17.12 PRIMARY OSTEOARTHRITIS OF LEFT KNEE: ICD-10-CM

## 2021-01-13 DIAGNOSIS — E78.2 MIXED HYPERLIPIDEMIA: ICD-10-CM

## 2021-01-13 DIAGNOSIS — I10 ESSENTIAL HYPERTENSION: ICD-10-CM

## 2021-01-13 DIAGNOSIS — K21.9 GASTROESOPHAGEAL REFLUX DISEASE WITHOUT ESOPHAGITIS: ICD-10-CM

## 2021-01-13 DIAGNOSIS — E78.2 MIXED HYPERLIPIDEMIA: Chronic | ICD-10-CM

## 2021-01-13 DIAGNOSIS — G47.33 OBSTRUCTIVE SLEEP APNEA: ICD-10-CM

## 2021-01-13 DIAGNOSIS — Z87.891 FORMER SMOKER: ICD-10-CM

## 2021-01-13 DIAGNOSIS — R06.09 DYSPNEA ON EXERTION: ICD-10-CM

## 2021-01-13 PROCEDURE — 3074F SYST BP LT 130 MM HG: CPT | Mod: HCNC,CPTII,S$GLB, | Performed by: STUDENT IN AN ORGANIZED HEALTH CARE EDUCATION/TRAINING PROGRAM

## 2021-01-13 PROCEDURE — 3008F BODY MASS INDEX DOCD: CPT | Mod: HCNC,CPTII,S$GLB, | Performed by: STUDENT IN AN ORGANIZED HEALTH CARE EDUCATION/TRAINING PROGRAM

## 2021-01-13 PROCEDURE — 1101F PT FALLS ASSESS-DOCD LE1/YR: CPT | Mod: HCNC,CPTII,S$GLB, | Performed by: STUDENT IN AN ORGANIZED HEALTH CARE EDUCATION/TRAINING PROGRAM

## 2021-01-13 PROCEDURE — 3078F PR MOST RECENT DIASTOLIC BLOOD PRESSURE < 80 MM HG: ICD-10-PCS | Mod: HCNC,CPTII,S$GLB, | Performed by: SURGERY

## 2021-01-13 PROCEDURE — 99215 OFFICE O/P EST HI 40 MIN: CPT | Mod: HCNC,S$GLB,, | Performed by: SURGERY

## 2021-01-13 PROCEDURE — 1101F PT FALLS ASSESS-DOCD LE1/YR: CPT | Mod: HCNC,CPTII,S$GLB, | Performed by: SURGERY

## 2021-01-13 PROCEDURE — 1159F PR MEDICATION LIST DOCUMENTED IN MEDICAL RECORD: ICD-10-PCS | Mod: HCNC,S$GLB,, | Performed by: SURGERY

## 2021-01-13 PROCEDURE — 99215 PR OFFICE/OUTPT VISIT, EST, LEVL V, 40-54 MIN: ICD-10-PCS | Mod: HCNC,S$GLB,, | Performed by: SURGERY

## 2021-01-13 PROCEDURE — 3078F PR MOST RECENT DIASTOLIC BLOOD PRESSURE < 80 MM HG: ICD-10-PCS | Mod: HCNC,CPTII,S$GLB, | Performed by: STUDENT IN AN ORGANIZED HEALTH CARE EDUCATION/TRAINING PROGRAM

## 2021-01-13 PROCEDURE — 1159F MED LIST DOCD IN RCRD: CPT | Mod: HCNC,S$GLB,, | Performed by: SURGERY

## 2021-01-13 PROCEDURE — 99213 PR OFFICE/OUTPT VISIT, EST, LEVL III, 20-29 MIN: ICD-10-PCS | Mod: HCNC,S$GLB,, | Performed by: STUDENT IN AN ORGANIZED HEALTH CARE EDUCATION/TRAINING PROGRAM

## 2021-01-13 PROCEDURE — 3008F PR BODY MASS INDEX (BMI) DOCUMENTED: ICD-10-PCS | Mod: HCNC,CPTII,S$GLB, | Performed by: SURGERY

## 2021-01-13 PROCEDURE — 1101F PR PT FALLS ASSESS DOC 0-1 FALLS W/OUT INJ PAST YR: ICD-10-PCS | Mod: HCNC,CPTII,S$GLB, | Performed by: SURGERY

## 2021-01-13 PROCEDURE — 3074F PR MOST RECENT SYSTOLIC BLOOD PRESSURE < 130 MM HG: ICD-10-PCS | Mod: HCNC,CPTII,S$GLB, | Performed by: STUDENT IN AN ORGANIZED HEALTH CARE EDUCATION/TRAINING PROGRAM

## 2021-01-13 PROCEDURE — 3008F PR BODY MASS INDEX (BMI) DOCUMENTED: ICD-10-PCS | Mod: HCNC,CPTII,S$GLB, | Performed by: STUDENT IN AN ORGANIZED HEALTH CARE EDUCATION/TRAINING PROGRAM

## 2021-01-13 PROCEDURE — 1126F AMNT PAIN NOTED NONE PRSNT: CPT | Mod: HCNC,S$GLB,, | Performed by: SURGERY

## 2021-01-13 PROCEDURE — 1126F AMNT PAIN NOTED NONE PRSNT: CPT | Mod: HCNC,S$GLB,, | Performed by: STUDENT IN AN ORGANIZED HEALTH CARE EDUCATION/TRAINING PROGRAM

## 2021-01-13 PROCEDURE — 99999 PR PBB SHADOW E&M-EST. PATIENT-LVL III: ICD-10-PCS | Mod: PBBFAC,HCNC,, | Performed by: STUDENT IN AN ORGANIZED HEALTH CARE EDUCATION/TRAINING PROGRAM

## 2021-01-13 PROCEDURE — 99213 OFFICE O/P EST LOW 20 MIN: CPT | Mod: HCNC,S$GLB,, | Performed by: STUDENT IN AN ORGANIZED HEALTH CARE EDUCATION/TRAINING PROGRAM

## 2021-01-13 PROCEDURE — 3074F PR MOST RECENT SYSTOLIC BLOOD PRESSURE < 130 MM HG: ICD-10-PCS | Mod: HCNC,CPTII,S$GLB, | Performed by: SURGERY

## 2021-01-13 PROCEDURE — 99499 UNLISTED E&M SERVICE: CPT | Mod: S$GLB,,, | Performed by: SURGERY

## 2021-01-13 PROCEDURE — 99999 PR PBB SHADOW E&M-EST. PATIENT-LVL III: CPT | Mod: PBBFAC,HCNC,, | Performed by: STUDENT IN AN ORGANIZED HEALTH CARE EDUCATION/TRAINING PROGRAM

## 2021-01-13 PROCEDURE — 3078F DIAST BP <80 MM HG: CPT | Mod: HCNC,CPTII,S$GLB, | Performed by: SURGERY

## 2021-01-13 PROCEDURE — 3288F PR FALLS RISK ASSESSMENT DOCUMENTED: ICD-10-PCS | Mod: HCNC,CPTII,S$GLB, | Performed by: SURGERY

## 2021-01-13 PROCEDURE — 1159F PR MEDICATION LIST DOCUMENTED IN MEDICAL RECORD: ICD-10-PCS | Mod: HCNC,S$GLB,, | Performed by: STUDENT IN AN ORGANIZED HEALTH CARE EDUCATION/TRAINING PROGRAM

## 2021-01-13 PROCEDURE — 1159F MED LIST DOCD IN RCRD: CPT | Mod: HCNC,S$GLB,, | Performed by: STUDENT IN AN ORGANIZED HEALTH CARE EDUCATION/TRAINING PROGRAM

## 2021-01-13 PROCEDURE — 99999 PR PBB SHADOW E&M-EST. PATIENT-LVL IV: ICD-10-PCS | Mod: PBBFAC,HCNC,, | Performed by: SURGERY

## 2021-01-13 PROCEDURE — 1126F PR PAIN SEVERITY QUANTIFIED, NO PAIN PRESENT: ICD-10-PCS | Mod: HCNC,S$GLB,, | Performed by: STUDENT IN AN ORGANIZED HEALTH CARE EDUCATION/TRAINING PROGRAM

## 2021-01-13 PROCEDURE — 1101F PR PT FALLS ASSESS DOC 0-1 FALLS W/OUT INJ PAST YR: ICD-10-PCS | Mod: HCNC,CPTII,S$GLB, | Performed by: STUDENT IN AN ORGANIZED HEALTH CARE EDUCATION/TRAINING PROGRAM

## 2021-01-13 PROCEDURE — 3288F FALL RISK ASSESSMENT DOCD: CPT | Mod: HCNC,CPTII,S$GLB, | Performed by: STUDENT IN AN ORGANIZED HEALTH CARE EDUCATION/TRAINING PROGRAM

## 2021-01-13 PROCEDURE — 3288F FALL RISK ASSESSMENT DOCD: CPT | Mod: HCNC,CPTII,S$GLB, | Performed by: SURGERY

## 2021-01-13 PROCEDURE — 99999 PR PBB SHADOW E&M-EST. PATIENT-LVL IV: CPT | Mod: PBBFAC,HCNC,, | Performed by: SURGERY

## 2021-01-13 PROCEDURE — 3074F SYST BP LT 130 MM HG: CPT | Mod: HCNC,CPTII,S$GLB, | Performed by: SURGERY

## 2021-01-13 PROCEDURE — 3008F BODY MASS INDEX DOCD: CPT | Mod: HCNC,CPTII,S$GLB, | Performed by: SURGERY

## 2021-01-13 PROCEDURE — 99499 RISK ADDL DX/OHS AUDIT: ICD-10-PCS | Mod: S$GLB,,, | Performed by: SURGERY

## 2021-01-13 PROCEDURE — 3078F DIAST BP <80 MM HG: CPT | Mod: HCNC,CPTII,S$GLB, | Performed by: STUDENT IN AN ORGANIZED HEALTH CARE EDUCATION/TRAINING PROGRAM

## 2021-01-13 PROCEDURE — 3288F PR FALLS RISK ASSESSMENT DOCUMENTED: ICD-10-PCS | Mod: HCNC,CPTII,S$GLB, | Performed by: STUDENT IN AN ORGANIZED HEALTH CARE EDUCATION/TRAINING PROGRAM

## 2021-01-13 PROCEDURE — 1126F PR PAIN SEVERITY QUANTIFIED, NO PAIN PRESENT: ICD-10-PCS | Mod: HCNC,S$GLB,, | Performed by: SURGERY

## 2021-01-13 RX ORDER — SEMAGLUTIDE 1.34 MG/ML
0.5 INJECTION, SOLUTION SUBCUTANEOUS
Qty: 1.5 ML | Refills: 0 | Status: SHIPPED | OUTPATIENT
Start: 2021-01-13 | End: 2021-02-01 | Stop reason: SDUPTHER

## 2021-01-14 ENCOUNTER — TELEPHONE (OUTPATIENT)
Dept: BARIATRICS | Facility: CLINIC | Age: 71
End: 2021-01-14

## 2021-01-14 ENCOUNTER — DOCUMENTATION ONLY (OUTPATIENT)
Dept: BARIATRICS | Facility: CLINIC | Age: 71
End: 2021-01-14

## 2021-01-19 ENCOUNTER — TELEPHONE (OUTPATIENT)
Dept: ENDOSCOPY | Facility: HOSPITAL | Age: 71
End: 2021-01-19

## 2021-01-19 ENCOUNTER — PATIENT OUTREACH (OUTPATIENT)
Dept: ADMINISTRATIVE | Facility: HOSPITAL | Age: 71
End: 2021-01-19

## 2021-01-19 DIAGNOSIS — Z01.818 PRE-OP TESTING: Primary | ICD-10-CM

## 2021-01-22 ENCOUNTER — HOSPITAL ENCOUNTER (OUTPATIENT)
Facility: OTHER | Age: 71
Discharge: HOME | End: 2021-01-22
Attending: ANESTHESIOLOGY | Admitting: ANESTHESIOLOGY
Payer: MEDICARE

## 2021-01-22 VITALS
HEART RATE: 70 BPM | HEIGHT: 66 IN | BODY MASS INDEX: 47.09 KG/M2 | SYSTOLIC BLOOD PRESSURE: 117 MMHG | WEIGHT: 293 LBS | OXYGEN SATURATION: 97 % | RESPIRATION RATE: 16 BRPM | TEMPERATURE: 98 F | DIASTOLIC BLOOD PRESSURE: 57 MMHG

## 2021-01-22 DIAGNOSIS — G89.29 CHRONIC PAIN: ICD-10-CM

## 2021-01-22 DIAGNOSIS — M17.12 PRIMARY OSTEOARTHRITIS OF LEFT KNEE: Primary | ICD-10-CM

## 2021-01-22 PROCEDURE — 64624 DSTRJ NULYT AGT GNCLR NRV: CPT | Mod: HCNC,LT | Performed by: ANESTHESIOLOGY

## 2021-01-22 PROCEDURE — 63600175 PHARM REV CODE 636 W HCPCS: Mod: HCNC | Performed by: ANESTHESIOLOGY

## 2021-01-22 PROCEDURE — 64624 DSTRJ NULYT AGT GNCLR NRV: CPT | Mod: HCNC,LT,, | Performed by: ANESTHESIOLOGY

## 2021-01-22 PROCEDURE — 64624 PR DESTRUCT, NEUROLYTIC AGENT, GENICULAR NERVE, W/IMG: ICD-10-PCS | Mod: HCNC,LT,, | Performed by: ANESTHESIOLOGY

## 2021-01-22 PROCEDURE — 25000003 PHARM REV CODE 250: Mod: HCNC | Performed by: ANESTHESIOLOGY

## 2021-01-22 PROCEDURE — A4649 SURGICAL SUPPLIES: HCPCS | Mod: HCNC | Performed by: ANESTHESIOLOGY

## 2021-01-22 RX ORDER — FENTANYL CITRATE 50 UG/ML
INJECTION, SOLUTION INTRAMUSCULAR; INTRAVENOUS
Status: DISCONTINUED | OUTPATIENT
Start: 2021-01-22 | End: 2021-01-22 | Stop reason: HOSPADM

## 2021-01-22 RX ORDER — MIDAZOLAM HYDROCHLORIDE 1 MG/ML
INJECTION INTRAMUSCULAR; INTRAVENOUS
Status: DISCONTINUED | OUTPATIENT
Start: 2021-01-22 | End: 2021-01-22 | Stop reason: HOSPADM

## 2021-01-22 RX ORDER — SODIUM CHLORIDE 9 MG/ML
INJECTION, SOLUTION INTRAVENOUS CONTINUOUS
Status: ACTIVE | OUTPATIENT
Start: 2021-01-22

## 2021-01-22 RX ORDER — BUPIVACAINE HYDROCHLORIDE 2.5 MG/ML
INJECTION, SOLUTION EPIDURAL; INFILTRATION; INTRACAUDAL
Status: DISCONTINUED | OUTPATIENT
Start: 2021-01-22 | End: 2021-01-22 | Stop reason: HOSPADM

## 2021-01-22 RX ORDER — LIDOCAINE HYDROCHLORIDE 10 MG/ML
INJECTION INFILTRATION; PERINEURAL
Status: DISCONTINUED | OUTPATIENT
Start: 2021-01-22 | End: 2021-01-22 | Stop reason: HOSPADM

## 2021-01-22 RX ORDER — SODIUM CHLORIDE 9 MG/ML
INJECTION, SOLUTION INTRAVENOUS CONTINUOUS
Status: DISCONTINUED | OUTPATIENT
Start: 2021-01-22 | End: 2021-01-22 | Stop reason: HOSPADM

## 2021-01-22 RX ORDER — DEXAMETHASONE SODIUM PHOSPHATE 4 MG/ML
INJECTION, SOLUTION INTRA-ARTICULAR; INTRALESIONAL; INTRAMUSCULAR; INTRAVENOUS; SOFT TISSUE
Status: DISCONTINUED | OUTPATIENT
Start: 2021-01-22 | End: 2021-01-22 | Stop reason: HOSPADM

## 2021-01-22 RX ADMIN — SODIUM CHLORIDE 25 ML/HR: 0.9 INJECTION, SOLUTION INTRAVENOUS at 10:01

## 2021-01-26 ENCOUNTER — HOSPITAL ENCOUNTER (OUTPATIENT)
Dept: CARDIOLOGY | Facility: HOSPITAL | Age: 71
Discharge: HOME | End: 2021-01-26
Attending: SURGERY
Payer: MEDICARE

## 2021-01-26 ENCOUNTER — HOSPITAL ENCOUNTER (OUTPATIENT)
Dept: CARDIOLOGY | Facility: CLINIC | Age: 71
Discharge: HOME | End: 2021-01-26
Attending: SURGERY
Payer: MEDICARE

## 2021-01-26 VITALS — HEART RATE: 75 BPM | WEIGHT: 293 LBS | BODY MASS INDEX: 47.09 KG/M2 | HEIGHT: 66 IN | RESPIRATION RATE: 18 BRPM

## 2021-01-26 DIAGNOSIS — I10 ESSENTIAL HYPERTENSION: ICD-10-CM

## 2021-01-26 DIAGNOSIS — Z87.891 FORMER SMOKER: ICD-10-CM

## 2021-01-26 DIAGNOSIS — E66.01 MORBID OBESITY WITH BMI OF 50.0-59.9, ADULT: ICD-10-CM

## 2021-01-26 LAB
ASCENDING AORTA: 2.95 CM
BSA FOR ECHO PROCEDURE: 2.76 M2
CV ECHO LV RWT: 0.44 CM
CV STRESS BASE HR: 82 BPM
DOP CALC LVOT AREA: 3.2 CM2
DOP CALC LVOT DIAMETER: 2.01 CM
DOP CALC LVOT PEAK VEL: 0.99 M/S
DOP CALC LVOT STROKE VOLUME: 53.22 CM3
DOP CALCLVOT PEAK VEL VTI: 16.78 CM
E WAVE DECELERATION TIME: 280.13 MSEC
E/A RATIO: 0.76
E/E' RATIO: 15.8 M/S
ECHO LV POSTERIOR WALL: 1.17 CM (ref 0.6–1.1)
FRACTIONAL SHORTENING: 28 % (ref 28–44)
INTERVENTRICULAR SEPTUM: 1.08 CM (ref 0.6–1.1)
IVRT: 88.49 MSEC
LA MAJOR: 5.64 CM
LA MINOR: 5.46 CM
LA WIDTH: 3.99 CM
LEFT ATRIUM SIZE: 3.93 CM
LEFT ATRIUM VOLUME INDEX: 28.8 ML/M2
LEFT ATRIUM VOLUME: 73.95 CM3
LEFT INTERNAL DIMENSION IN SYSTOLE: 3.8 CM (ref 2.1–4)
LEFT VENTRICLE DIASTOLIC VOLUME INDEX: 52.45 ML/M2
LEFT VENTRICLE DIASTOLIC VOLUME: 134.8 ML
LEFT VENTRICLE MASS INDEX: 91 G/M2
LEFT VENTRICLE SYSTOLIC VOLUME INDEX: 24.1 ML/M2
LEFT VENTRICLE SYSTOLIC VOLUME: 61.94 ML
LEFT VENTRICULAR INTERNAL DIMENSION IN DIASTOLE: 5.29 CM (ref 3.5–6)
LEFT VENTRICULAR MASS: 234.08 G
LV LATERAL E/E' RATIO: 15.8 M/S
LV SEPTAL E/E' RATIO: 15.8 M/S
MV A" WAVE DURATION": 6.85 MSEC
MV PEAK A VEL: 1.04 M/S
MV PEAK E VEL: 0.79 M/S
OHS CV CPX 1 MINUTE RECOVERY HEART RATE: 133 BPM
OHS CV CPX 85 PERCENT MAX PREDICTED HEART RATE MALE: 124
OHS CV CPX MAX PREDICTED HEART RATE: 146
OHS CV CPX PATIENT IS FEMALE: 1
OHS CV CPX PATIENT IS MALE: 0
OHS CV CPX PEAK HEAR RATE: 133 BPM
OHS CV CPX PERCENT MAX PREDICTED HEART RATE ACHIEVED: 91
PISA TR MAX VEL: 2.49 M/S
PULM VEIN S/D RATIO: 2.13
PV PEAK D VEL: 0.24 M/S
PV PEAK S VEL: 0.51 M/S
RA MAJOR: 4.26 CM
RA PRESSURE: 3 MMHG
RA WIDTH: 3.24 CM
RETIRED EF AND QEF - SEE NOTES: 59 %
RIGHT VENTRICULAR END-DIASTOLIC DIMENSION: 3.57 CM
RV TISSUE DOPPLER FREE WALL SYSTOLIC VELOCITY 1 (APICAL 4 CHAMBER VIEW): 7.75 CM/S
SINUS: 3.66 CM
STJ: 2.75 CM
TDI LATERAL: 0.05 M/S
TDI SEPTAL: 0.05 M/S
TDI: 0.05 M/S
TR MAX PG: 25 MMHG
TRICUSPID ANNULAR PLANE SYSTOLIC EXCURSION: 1.81 CM
TV REST PULMONARY ARTERY PRESSURE: 28 MMHG

## 2021-01-26 PROCEDURE — 93351 STRESS TTE COMPLETE: CPT | Mod: HCNC

## 2021-01-26 PROCEDURE — 93010 EKG 12-LEAD: ICD-10-PCS | Mod: HCNC,S$GLB,, | Performed by: INTERNAL MEDICINE

## 2021-01-26 PROCEDURE — 93351 STRESS TTE COMPLETE: CPT | Mod: 26,HCNC,, | Performed by: INTERNAL MEDICINE

## 2021-01-26 PROCEDURE — 93010 ELECTROCARDIOGRAM REPORT: CPT | Mod: HCNC,S$GLB,, | Performed by: INTERNAL MEDICINE

## 2021-01-26 PROCEDURE — 93005 EKG 12-LEAD: ICD-10-PCS | Mod: HCNC,S$GLB,, | Performed by: SURGERY

## 2021-01-26 PROCEDURE — 93005 ELECTROCARDIOGRAM TRACING: CPT | Mod: HCNC,S$GLB,, | Performed by: SURGERY

## 2021-01-26 PROCEDURE — 93351 STRESS ECHO (CUPID ONLY): ICD-10-PCS | Mod: 26,HCNC,, | Performed by: INTERNAL MEDICINE

## 2021-01-26 PROCEDURE — 63600175 PHARM REV CODE 636 W HCPCS: Mod: HCNC | Performed by: SURGERY

## 2021-01-26 RX ORDER — DOBUTAMINE HYDROCHLORIDE 400 MG/100ML
20 INJECTION INTRAVENOUS
Status: COMPLETED | OUTPATIENT
Start: 2021-01-26 | End: 2021-01-26

## 2021-01-26 RX ADMIN — DOBUTAMINE HYDROCHLORIDE 20 MCG/KG/MIN: 400 INJECTION INTRAVENOUS at 10:01

## 2021-01-27 PROBLEM — I50.32 CHRONIC DIASTOLIC CONGESTIVE HEART FAILURE: Chronic | Status: ACTIVE | Noted: 2021-01-27

## 2021-01-27 PROBLEM — E78.00 PURE HYPERCHOLESTEROLEMIA: Chronic | Status: ACTIVE | Noted: 2021-01-27

## 2021-01-28 ENCOUNTER — OFFICE VISIT (OUTPATIENT)
Dept: CARDIOLOGY | Facility: CLINIC | Age: 71
End: 2021-01-28
Payer: MEDICARE

## 2021-01-28 VITALS
DIASTOLIC BLOOD PRESSURE: 62 MMHG | BODY MASS INDEX: 47.09 KG/M2 | HEIGHT: 66 IN | SYSTOLIC BLOOD PRESSURE: 130 MMHG | WEIGHT: 293 LBS | HEART RATE: 77 BPM

## 2021-01-28 DIAGNOSIS — G47.33 OBSTRUCTIVE SLEEP APNEA: ICD-10-CM

## 2021-01-28 DIAGNOSIS — R94.39 POSITIVE CARDIAC STRESS TEST: ICD-10-CM

## 2021-01-28 DIAGNOSIS — Z72.0 TOBACCO ABUSE: Chronic | ICD-10-CM

## 2021-01-28 DIAGNOSIS — Z01.818 PRE-OP EXAMINATION: ICD-10-CM

## 2021-01-28 DIAGNOSIS — E66.01 CLASS 3 SEVERE OBESITY DUE TO EXCESS CALORIES WITH SERIOUS COMORBIDITY AND BODY MASS INDEX (BMI) OF 50.0 TO 59.9 IN ADULT: ICD-10-CM

## 2021-01-28 DIAGNOSIS — E78.00 PURE HYPERCHOLESTEROLEMIA: Chronic | ICD-10-CM

## 2021-01-28 DIAGNOSIS — E66.01 MORBID OBESITY WITH BMI OF 50.0-59.9, ADULT: ICD-10-CM

## 2021-01-28 DIAGNOSIS — I50.32 CHRONIC DIASTOLIC CONGESTIVE HEART FAILURE: Chronic | ICD-10-CM

## 2021-01-28 DIAGNOSIS — I10 ESSENTIAL HYPERTENSION: Primary | ICD-10-CM

## 2021-01-28 PROCEDURE — 99204 OFFICE O/P NEW MOD 45 MIN: CPT | Mod: HCNC,GC,S$GLB, | Performed by: INTERNAL MEDICINE

## 2021-01-28 PROCEDURE — 99999 PR PBB SHADOW E&M-EST. PATIENT-LVL V: CPT | Mod: PBBFAC,HCNC,GC, | Performed by: INTERNAL MEDICINE

## 2021-01-28 PROCEDURE — 99204 PR OFFICE/OUTPT VISIT, NEW, LEVL IV, 45-59 MIN: ICD-10-PCS | Mod: HCNC,GC,S$GLB, | Performed by: INTERNAL MEDICINE

## 2021-01-28 PROCEDURE — 99999 PR PBB SHADOW E&M-EST. PATIENT-LVL V: ICD-10-PCS | Mod: PBBFAC,HCNC,GC, | Performed by: INTERNAL MEDICINE

## 2021-01-28 RX ORDER — NAPROXEN SODIUM 220 MG/1
81 TABLET, FILM COATED ORAL DAILY
Qty: 90 TABLET | Refills: 3 | Status: ON HOLD | OUTPATIENT
Start: 2021-01-28 | End: 2021-02-24 | Stop reason: HOSPADM

## 2021-01-29 DIAGNOSIS — Z79.891 ENCOUNTER FOR MONITORING OPIOID MAINTENANCE THERAPY: ICD-10-CM

## 2021-01-29 DIAGNOSIS — Z51.81 ENCOUNTER FOR MONITORING OPIOID MAINTENANCE THERAPY: ICD-10-CM

## 2021-01-29 RX ORDER — OXYCODONE AND ACETAMINOPHEN 5; 325 MG/1; MG/1
1 TABLET ORAL EVERY 8 HOURS PRN
Qty: 90 TABLET | Refills: 0 | Status: SHIPPED | OUTPATIENT
Start: 2021-01-30 | End: 2021-02-26 | Stop reason: SDUPTHER

## 2021-02-01 RX ORDER — SEMAGLUTIDE 1.34 MG/ML
0.5 INJECTION, SOLUTION SUBCUTANEOUS
Qty: 1.5 ML | Refills: 0 | Status: SHIPPED | OUTPATIENT
Start: 2021-02-01 | End: 2021-02-11 | Stop reason: DRUGHIGH

## 2021-02-02 ENCOUNTER — LAB VISIT (OUTPATIENT)
Dept: INTERNAL MEDICINE | Facility: CLINIC | Age: 71
End: 2021-02-02
Payer: MEDICARE

## 2021-02-02 ENCOUNTER — IMMUNIZATION (OUTPATIENT)
Dept: PHARMACY | Facility: CLINIC | Age: 71
End: 2021-02-02
Payer: MEDICARE

## 2021-02-02 ENCOUNTER — OFFICE VISIT (OUTPATIENT)
Dept: INTERNAL MEDICINE | Facility: CLINIC | Age: 71
End: 2021-02-02
Payer: MEDICARE

## 2021-02-02 VITALS
OXYGEN SATURATION: 99 % | HEIGHT: 66 IN | BODY MASS INDEX: 47.09 KG/M2 | SYSTOLIC BLOOD PRESSURE: 134 MMHG | WEIGHT: 293 LBS | HEART RATE: 78 BPM | DIASTOLIC BLOOD PRESSURE: 72 MMHG

## 2021-02-02 DIAGNOSIS — Z72.0 TOBACCO ABUSE: ICD-10-CM

## 2021-02-02 DIAGNOSIS — K21.9 GERD WITHOUT ESOPHAGITIS: ICD-10-CM

## 2021-02-02 DIAGNOSIS — E78.2 MIXED HYPERLIPIDEMIA: ICD-10-CM

## 2021-02-02 DIAGNOSIS — F32.0 CURRENT MILD EPISODE OF MAJOR DEPRESSIVE DISORDER WITHOUT PRIOR EPISODE: Primary | ICD-10-CM

## 2021-02-02 DIAGNOSIS — Z12.31 ENCOUNTER FOR SCREENING MAMMOGRAM FOR MALIGNANT NEOPLASM OF BREAST: ICD-10-CM

## 2021-02-02 DIAGNOSIS — I10 ESSENTIAL HYPERTENSION: ICD-10-CM

## 2021-02-02 DIAGNOSIS — Z01.818 PRE-OP TESTING: ICD-10-CM

## 2021-02-02 DIAGNOSIS — E66.01 CLASS 3 SEVERE OBESITY DUE TO EXCESS CALORIES WITH SERIOUS COMORBIDITY AND BODY MASS INDEX (BMI) OF 50.0 TO 59.9 IN ADULT: ICD-10-CM

## 2021-02-02 PROCEDURE — 1126F PR PAIN SEVERITY QUANTIFIED, NO PAIN PRESENT: ICD-10-PCS | Mod: S$GLB,,, | Performed by: INTERNAL MEDICINE

## 2021-02-02 PROCEDURE — 3078F PR MOST RECENT DIASTOLIC BLOOD PRESSURE < 80 MM HG: ICD-10-PCS | Mod: CPTII,S$GLB,, | Performed by: INTERNAL MEDICINE

## 2021-02-02 PROCEDURE — 1159F MED LIST DOCD IN RCRD: CPT | Mod: S$GLB,,, | Performed by: INTERNAL MEDICINE

## 2021-02-02 PROCEDURE — 3008F PR BODY MASS INDEX (BMI) DOCUMENTED: ICD-10-PCS | Mod: CPTII,S$GLB,, | Performed by: INTERNAL MEDICINE

## 2021-02-02 PROCEDURE — 3078F DIAST BP <80 MM HG: CPT | Mod: CPTII,S$GLB,, | Performed by: INTERNAL MEDICINE

## 2021-02-02 PROCEDURE — 1126F AMNT PAIN NOTED NONE PRSNT: CPT | Mod: S$GLB,,, | Performed by: INTERNAL MEDICINE

## 2021-02-02 PROCEDURE — 3288F PR FALLS RISK ASSESSMENT DOCUMENTED: ICD-10-PCS | Mod: CPTII,S$GLB,, | Performed by: INTERNAL MEDICINE

## 2021-02-02 PROCEDURE — 99999 PR PBB SHADOW E&M-EST. PATIENT-LVL IV: ICD-10-PCS | Mod: PBBFAC,,, | Performed by: INTERNAL MEDICINE

## 2021-02-02 PROCEDURE — 99499 UNLISTED E&M SERVICE: CPT | Mod: S$GLB,,, | Performed by: INTERNAL MEDICINE

## 2021-02-02 PROCEDURE — 1159F PR MEDICATION LIST DOCUMENTED IN MEDICAL RECORD: ICD-10-PCS | Mod: S$GLB,,, | Performed by: INTERNAL MEDICINE

## 2021-02-02 PROCEDURE — 1101F PT FALLS ASSESS-DOCD LE1/YR: CPT | Mod: CPTII,S$GLB,, | Performed by: INTERNAL MEDICINE

## 2021-02-02 PROCEDURE — 99214 OFFICE O/P EST MOD 30 MIN: CPT | Mod: S$GLB,,, | Performed by: INTERNAL MEDICINE

## 2021-02-02 PROCEDURE — 99214 PR OFFICE/OUTPT VISIT, EST, LEVL IV, 30-39 MIN: ICD-10-PCS | Mod: S$GLB,,, | Performed by: INTERNAL MEDICINE

## 2021-02-02 PROCEDURE — 1101F PR PT FALLS ASSESS DOC 0-1 FALLS W/OUT INJ PAST YR: ICD-10-PCS | Mod: CPTII,S$GLB,, | Performed by: INTERNAL MEDICINE

## 2021-02-02 PROCEDURE — 3075F PR MOST RECENT SYSTOLIC BLOOD PRESS GE 130-139MM HG: ICD-10-PCS | Mod: CPTII,S$GLB,, | Performed by: INTERNAL MEDICINE

## 2021-02-02 PROCEDURE — 99999 PR PBB SHADOW E&M-EST. PATIENT-LVL IV: CPT | Mod: PBBFAC,,, | Performed by: INTERNAL MEDICINE

## 2021-02-02 PROCEDURE — U0003 INFECTIOUS AGENT DETECTION BY NUCLEIC ACID (DNA OR RNA); SEVERE ACUTE RESPIRATORY SYNDROME CORONAVIRUS 2 (SARS-COV-2) (CORONAVIRUS DISEASE [COVID-19]), AMPLIFIED PROBE TECHNIQUE, MAKING USE OF HIGH THROUGHPUT TECHNOLOGIES AS DESCRIBED BY CMS-2020-01-R: HCPCS

## 2021-02-02 PROCEDURE — 3075F SYST BP GE 130 - 139MM HG: CPT | Mod: CPTII,S$GLB,, | Performed by: INTERNAL MEDICINE

## 2021-02-02 PROCEDURE — 3008F BODY MASS INDEX DOCD: CPT | Mod: CPTII,S$GLB,, | Performed by: INTERNAL MEDICINE

## 2021-02-02 PROCEDURE — 99499 RISK ADDL DX/OHS AUDIT: ICD-10-PCS | Mod: S$GLB,,, | Performed by: INTERNAL MEDICINE

## 2021-02-02 PROCEDURE — 3288F FALL RISK ASSESSMENT DOCD: CPT | Mod: CPTII,S$GLB,, | Performed by: INTERNAL MEDICINE

## 2021-02-02 RX ORDER — AMLODIPINE BESYLATE 10 MG/1
10 TABLET ORAL DAILY
Qty: 90 TABLET | Refills: 11 | Status: SHIPPED | OUTPATIENT
Start: 2021-02-02 | End: 2022-03-11

## 2021-02-02 RX ORDER — PANTOPRAZOLE SODIUM 40 MG/1
40 TABLET, DELAYED RELEASE ORAL DAILY
Qty: 90 TABLET | Refills: 11 | Status: ON HOLD | OUTPATIENT
Start: 2021-02-02 | End: 2021-05-25

## 2021-02-02 RX ORDER — ATORVASTATIN CALCIUM 20 MG/1
20 TABLET, FILM COATED ORAL DAILY
Qty: 90 TABLET | Refills: 11 | Status: SHIPPED | OUTPATIENT
Start: 2021-02-02 | End: 2022-02-15

## 2021-02-02 RX ORDER — LISINOPRIL 40 MG/1
40 TABLET ORAL DAILY
Qty: 90 TABLET | Refills: 11 | Status: SHIPPED | OUTPATIENT
Start: 2021-02-02 | End: 2022-03-11

## 2021-02-03 ENCOUNTER — TELEPHONE (OUTPATIENT)
Dept: INTERNAL MEDICINE | Facility: CLINIC | Age: 71
End: 2021-02-03

## 2021-02-03 ENCOUNTER — PATIENT OUTREACH (OUTPATIENT)
Dept: ADMINISTRATIVE | Facility: HOSPITAL | Age: 71
End: 2021-02-03

## 2021-02-03 DIAGNOSIS — Z01.818 PRE-OP TESTING: Primary | ICD-10-CM

## 2021-02-03 PROBLEM — F11.20 CHRONIC NARCOTIC DEPENDENCE: Status: RESOLVED | Noted: 2020-09-11 | Resolved: 2021-02-03

## 2021-02-03 LAB — SARS-COV-2 RNA RESP QL NAA+PROBE: NOT DETECTED

## 2021-02-04 ENCOUNTER — OFFICE VISIT (OUTPATIENT)
Dept: CARDIOLOGY | Facility: CLINIC | Age: 71
End: 2021-02-04
Payer: MEDICARE

## 2021-02-04 ENCOUNTER — DOCUMENTATION ONLY (OUTPATIENT)
Dept: CARDIOLOGY | Facility: CLINIC | Age: 71
End: 2021-02-04

## 2021-02-04 VITALS
DIASTOLIC BLOOD PRESSURE: 57 MMHG | BODY MASS INDEX: 47.09 KG/M2 | HEART RATE: 74 BPM | OXYGEN SATURATION: 95 % | WEIGHT: 293 LBS | SYSTOLIC BLOOD PRESSURE: 119 MMHG | HEIGHT: 66 IN

## 2021-02-04 DIAGNOSIS — I10 ESSENTIAL HYPERTENSION: ICD-10-CM

## 2021-02-04 DIAGNOSIS — Z01.818 PRE-OP EXAMINATION: ICD-10-CM

## 2021-02-04 DIAGNOSIS — R94.39 POSITIVE CARDIAC STRESS TEST: ICD-10-CM

## 2021-02-04 DIAGNOSIS — I50.32 CHRONIC DIASTOLIC CONGESTIVE HEART FAILURE: Chronic | ICD-10-CM

## 2021-02-04 DIAGNOSIS — Z72.0 TOBACCO ABUSE: Chronic | ICD-10-CM

## 2021-02-04 DIAGNOSIS — E78.00 PURE HYPERCHOLESTEROLEMIA: Primary | Chronic | ICD-10-CM

## 2021-02-04 PROCEDURE — 3008F BODY MASS INDEX DOCD: CPT | Mod: CPTII,S$GLB,, | Performed by: INTERNAL MEDICINE

## 2021-02-04 PROCEDURE — 3078F PR MOST RECENT DIASTOLIC BLOOD PRESSURE < 80 MM HG: ICD-10-PCS | Mod: CPTII,S$GLB,, | Performed by: INTERNAL MEDICINE

## 2021-02-04 PROCEDURE — 99204 PR OFFICE/OUTPT VISIT, NEW, LEVL IV, 45-59 MIN: ICD-10-PCS | Mod: S$GLB,,, | Performed by: INTERNAL MEDICINE

## 2021-02-04 PROCEDURE — 1125F AMNT PAIN NOTED PAIN PRSNT: CPT | Mod: S$GLB,,, | Performed by: INTERNAL MEDICINE

## 2021-02-04 PROCEDURE — 99999 PR PBB SHADOW E&M-EST. PATIENT-LVL V: CPT | Mod: PBBFAC,,, | Performed by: INTERNAL MEDICINE

## 2021-02-04 PROCEDURE — 3078F DIAST BP <80 MM HG: CPT | Mod: CPTII,S$GLB,, | Performed by: INTERNAL MEDICINE

## 2021-02-04 PROCEDURE — 3074F SYST BP LT 130 MM HG: CPT | Mod: CPTII,S$GLB,, | Performed by: INTERNAL MEDICINE

## 2021-02-04 PROCEDURE — 1159F MED LIST DOCD IN RCRD: CPT | Mod: S$GLB,,, | Performed by: INTERNAL MEDICINE

## 2021-02-04 PROCEDURE — 1159F PR MEDICATION LIST DOCUMENTED IN MEDICAL RECORD: ICD-10-PCS | Mod: S$GLB,,, | Performed by: INTERNAL MEDICINE

## 2021-02-04 PROCEDURE — 3008F PR BODY MASS INDEX (BMI) DOCUMENTED: ICD-10-PCS | Mod: CPTII,S$GLB,, | Performed by: INTERNAL MEDICINE

## 2021-02-04 PROCEDURE — 99999 PR PBB SHADOW E&M-EST. PATIENT-LVL V: ICD-10-PCS | Mod: PBBFAC,,, | Performed by: INTERNAL MEDICINE

## 2021-02-04 PROCEDURE — 99204 OFFICE O/P NEW MOD 45 MIN: CPT | Mod: S$GLB,,, | Performed by: INTERNAL MEDICINE

## 2021-02-04 PROCEDURE — 3074F PR MOST RECENT SYSTOLIC BLOOD PRESSURE < 130 MM HG: ICD-10-PCS | Mod: CPTII,S$GLB,, | Performed by: INTERNAL MEDICINE

## 2021-02-04 PROCEDURE — 1125F PR PAIN SEVERITY QUANTIFIED, PAIN PRESENT: ICD-10-PCS | Mod: S$GLB,,, | Performed by: INTERNAL MEDICINE

## 2021-02-04 RX ORDER — SODIUM CHLORIDE 9 MG/ML
INJECTION, SOLUTION INTRAVENOUS ONCE
Status: CANCELLED | OUTPATIENT
Start: 2021-02-04 | End: 2021-02-04

## 2021-02-04 RX ORDER — SODIUM CHLORIDE 0.9 % (FLUSH) 0.9 %
10 SYRINGE (ML) INJECTION
Status: DISCONTINUED | OUTPATIENT
Start: 2021-02-04 | End: 2021-02-24 | Stop reason: HOSPADM

## 2021-02-04 RX ORDER — CLOPIDOGREL BISULFATE 75 MG/1
75 TABLET ORAL DAILY
Qty: 90 TABLET | Refills: 3 | Status: ON HOLD | OUTPATIENT
Start: 2021-02-04 | End: 2021-02-24 | Stop reason: HOSPADM

## 2021-02-05 ENCOUNTER — HOSPITAL ENCOUNTER (OUTPATIENT)
Facility: HOSPITAL | Age: 71
Discharge: HOME | End: 2021-02-05
Attending: INTERNAL MEDICINE | Admitting: INTERNAL MEDICINE
Payer: MEDICARE

## 2021-02-05 ENCOUNTER — ANESTHESIA (OUTPATIENT)
Dept: ENDOSCOPY | Facility: HOSPITAL | Age: 71
End: 2021-02-05
Payer: MEDICARE

## 2021-02-05 ENCOUNTER — ANESTHESIA EVENT (OUTPATIENT)
Dept: ENDOSCOPY | Facility: HOSPITAL | Age: 71
End: 2021-02-05
Payer: MEDICARE

## 2021-02-05 VITALS
BODY MASS INDEX: 47.09 KG/M2 | SYSTOLIC BLOOD PRESSURE: 122 MMHG | DIASTOLIC BLOOD PRESSURE: 68 MMHG | HEIGHT: 66 IN | HEART RATE: 77 BPM | RESPIRATION RATE: 18 BRPM | OXYGEN SATURATION: 98 % | TEMPERATURE: 97 F | WEIGHT: 293 LBS

## 2021-02-05 DIAGNOSIS — Z98.84 BARIATRIC SURGERY STATUS: ICD-10-CM

## 2021-02-05 DIAGNOSIS — K25.9 GASTRIC ULCER, UNSPECIFIED CHRONICITY, UNSPECIFIED WHETHER GASTRIC ULCER HEMORRHAGE OR PERFORATION PRESENT: Primary | ICD-10-CM

## 2021-02-05 PROCEDURE — 88342 CHG IMMUNOCYTOCHEMISTRY: ICD-10-PCS | Mod: 26,,, | Performed by: PATHOLOGY

## 2021-02-05 PROCEDURE — D9220A PRA ANESTHESIA: Mod: ANES,,, | Performed by: ANESTHESIOLOGY

## 2021-02-05 PROCEDURE — 88342 IMHCHEM/IMCYTCHM 1ST ANTB: CPT | Performed by: PATHOLOGY

## 2021-02-05 PROCEDURE — 43239 PR EGD, FLEX, W/BIOPSY, SGL/MULTI: ICD-10-PCS | Mod: GC,,, | Performed by: INTERNAL MEDICINE

## 2021-02-05 PROCEDURE — 88342 IMHCHEM/IMCYTCHM 1ST ANTB: CPT | Mod: 26,,, | Performed by: PATHOLOGY

## 2021-02-05 PROCEDURE — D9220A PRA ANESTHESIA: Mod: CRNA,,, | Performed by: NURSE ANESTHETIST, CERTIFIED REGISTERED

## 2021-02-05 PROCEDURE — 37000009 HC ANESTHESIA EA ADD 15 MINS: Performed by: INTERNAL MEDICINE

## 2021-02-05 PROCEDURE — 25000003 PHARM REV CODE 250: Performed by: NURSE ANESTHETIST, CERTIFIED REGISTERED

## 2021-02-05 PROCEDURE — 88305 TISSUE EXAM BY PATHOLOGIST: CPT | Mod: 26,,, | Performed by: PATHOLOGY

## 2021-02-05 PROCEDURE — 88305 TISSUE EXAM BY PATHOLOGIST: ICD-10-PCS | Mod: 26,,, | Performed by: PATHOLOGY

## 2021-02-05 PROCEDURE — 43239 EGD BIOPSY SINGLE/MULTIPLE: CPT | Performed by: INTERNAL MEDICINE

## 2021-02-05 PROCEDURE — 63600175 PHARM REV CODE 636 W HCPCS: Performed by: NURSE ANESTHETIST, CERTIFIED REGISTERED

## 2021-02-05 PROCEDURE — 25000003 PHARM REV CODE 250: Performed by: ANESTHESIOLOGY

## 2021-02-05 PROCEDURE — 43239 EGD BIOPSY SINGLE/MULTIPLE: CPT | Mod: GC,,, | Performed by: INTERNAL MEDICINE

## 2021-02-05 PROCEDURE — 88305 TISSUE EXAM BY PATHOLOGIST: CPT | Mod: 59 | Performed by: PATHOLOGY

## 2021-02-05 PROCEDURE — D9220A PRA ANESTHESIA: ICD-10-PCS | Mod: ANES,,, | Performed by: ANESTHESIOLOGY

## 2021-02-05 PROCEDURE — D9220A PRA ANESTHESIA: ICD-10-PCS | Mod: CRNA,,, | Performed by: NURSE ANESTHETIST, CERTIFIED REGISTERED

## 2021-02-05 PROCEDURE — 27201012 HC FORCEPS, HOT/COLD, DISP: Performed by: INTERNAL MEDICINE

## 2021-02-05 PROCEDURE — 37000008 HC ANESTHESIA 1ST 15 MINUTES: Performed by: INTERNAL MEDICINE

## 2021-02-05 RX ORDER — SODIUM CHLORIDE 0.9 % (FLUSH) 0.9 %
3 SYRINGE (ML) INJECTION
Status: DISCONTINUED | OUTPATIENT
Start: 2021-02-05 | End: 2021-02-05 | Stop reason: HOSPADM

## 2021-02-05 RX ORDER — PROPOFOL 10 MG/ML
VIAL (ML) INTRAVENOUS
Status: DISCONTINUED | OUTPATIENT
Start: 2021-02-05 | End: 2021-02-05

## 2021-02-05 RX ORDER — LIDOCAINE HYDROCHLORIDE 20 MG/ML
INJECTION INTRAVENOUS
Status: DISCONTINUED | OUTPATIENT
Start: 2021-02-05 | End: 2021-02-05

## 2021-02-05 RX ORDER — PROPOFOL 10 MG/ML
VIAL (ML) INTRAVENOUS CONTINUOUS PRN
Status: DISCONTINUED | OUTPATIENT
Start: 2021-02-05 | End: 2021-02-05

## 2021-02-05 RX ORDER — SODIUM CHLORIDE 9 MG/ML
INJECTION, SOLUTION INTRAVENOUS CONTINUOUS
Status: DISCONTINUED | OUTPATIENT
Start: 2021-02-05 | End: 2021-02-05 | Stop reason: HOSPADM

## 2021-02-05 RX ADMIN — PROPOFOL 70 MG: 10 INJECTION, EMULSION INTRAVENOUS at 08:02

## 2021-02-05 RX ADMIN — LIDOCAINE HYDROCHLORIDE 50 MG: 20 INJECTION, SOLUTION INTRAVENOUS at 08:02

## 2021-02-05 RX ADMIN — PROPOFOL 250 MCG/KG/MIN: 10 INJECTION, EMULSION INTRAVENOUS at 08:02

## 2021-02-05 RX ADMIN — SODIUM CHLORIDE: 0.9 INJECTION, SOLUTION INTRAVENOUS at 08:02

## 2021-02-10 ENCOUNTER — TELEPHONE (OUTPATIENT)
Dept: GASTROENTEROLOGY | Facility: CLINIC | Age: 71
End: 2021-02-10

## 2021-02-10 DIAGNOSIS — D49.0 GASTRIC TUMOR: Primary | ICD-10-CM

## 2021-02-10 LAB
FINAL PATHOLOGIC DIAGNOSIS: NORMAL
GROSS: NORMAL
Lab: NORMAL

## 2021-02-11 ENCOUNTER — OFFICE VISIT (OUTPATIENT)
Dept: BARIATRICS | Facility: CLINIC | Age: 71
End: 2021-02-11
Payer: MEDICARE

## 2021-02-11 ENCOUNTER — LAB VISIT (OUTPATIENT)
Dept: LAB | Facility: HOSPITAL | Age: 71
End: 2021-02-11
Attending: INTERNAL MEDICINE
Payer: MEDICARE

## 2021-02-11 VITALS
SYSTOLIC BLOOD PRESSURE: 130 MMHG | DIASTOLIC BLOOD PRESSURE: 84 MMHG | WEIGHT: 293 LBS | OXYGEN SATURATION: 96 % | HEIGHT: 66 IN | HEART RATE: 67 BPM | BODY MASS INDEX: 47.09 KG/M2

## 2021-02-11 DIAGNOSIS — I10 ESSENTIAL HYPERTENSION: ICD-10-CM

## 2021-02-11 DIAGNOSIS — Z01.818 PRE-OP TESTING: ICD-10-CM

## 2021-02-11 DIAGNOSIS — R06.09 DYSPNEA ON EXERTION: ICD-10-CM

## 2021-02-11 DIAGNOSIS — G47.33 OBSTRUCTIVE SLEEP APNEA: ICD-10-CM

## 2021-02-11 DIAGNOSIS — E78.00 PURE HYPERCHOLESTEROLEMIA: Chronic | ICD-10-CM

## 2021-02-11 DIAGNOSIS — Z72.0 TOBACCO ABUSE: Chronic | ICD-10-CM

## 2021-02-11 DIAGNOSIS — K21.9 GASTROESOPHAGEAL REFLUX DISEASE WITHOUT ESOPHAGITIS: ICD-10-CM

## 2021-02-11 DIAGNOSIS — E66.01 CLASS 3 SEVERE OBESITY DUE TO EXCESS CALORIES WITH SERIOUS COMORBIDITY AND BODY MASS INDEX (BMI) OF 50.0 TO 59.9 IN ADULT: Primary | ICD-10-CM

## 2021-02-11 LAB
ANION GAP SERPL CALC-SCNC: 7 MMOL/L (ref 8–16)
BASOPHILS # BLD AUTO: 0.05 K/UL (ref 0–0.2)
BASOPHILS NFR BLD: 0.4 % (ref 0–1.9)
BUN SERPL-MCNC: 17 MG/DL (ref 8–23)
CALCIUM SERPL-MCNC: 9.2 MG/DL (ref 8.7–10.5)
CHLORIDE SERPL-SCNC: 109 MMOL/L (ref 95–110)
CO2 SERPL-SCNC: 27 MMOL/L (ref 23–29)
CREAT SERPL-MCNC: 0.8 MG/DL (ref 0.5–1.4)
DIFFERENTIAL METHOD: ABNORMAL
EOSINOPHIL # BLD AUTO: 0.2 K/UL (ref 0–0.5)
EOSINOPHIL NFR BLD: 1.7 % (ref 0–8)
ERYTHROCYTE [DISTWIDTH] IN BLOOD BY AUTOMATED COUNT: 12.1 % (ref 11.5–14.5)
EST. GFR  (AFRICAN AMERICAN): >60 ML/MIN/1.73 M^2
EST. GFR  (NON AFRICAN AMERICAN): >60 ML/MIN/1.73 M^2
GLUCOSE SERPL-MCNC: 94 MG/DL (ref 70–110)
HCT VFR BLD AUTO: 39 % (ref 37–48.5)
HGB BLD-MCNC: 11.6 G/DL (ref 12–16)
IMM GRANULOCYTES # BLD AUTO: 0.04 K/UL (ref 0–0.04)
IMM GRANULOCYTES NFR BLD AUTO: 0.3 % (ref 0–0.5)
LYMPHOCYTES # BLD AUTO: 4.5 K/UL (ref 1–4.8)
LYMPHOCYTES NFR BLD: 34.1 % (ref 18–48)
MCH RBC QN AUTO: 31 PG (ref 27–31)
MCHC RBC AUTO-ENTMCNC: 29.7 G/DL (ref 32–36)
MCV RBC AUTO: 104 FL (ref 82–98)
MONOCYTES # BLD AUTO: 0.9 K/UL (ref 0.3–1)
MONOCYTES NFR BLD: 7 % (ref 4–15)
NEUTROPHILS # BLD AUTO: 7.4 K/UL (ref 1.8–7.7)
NEUTROPHILS NFR BLD: 56.5 % (ref 38–73)
NRBC BLD-RTO: 0 /100 WBC
PLATELET # BLD AUTO: 272 K/UL (ref 150–350)
PMV BLD AUTO: 10.5 FL (ref 9.2–12.9)
POTASSIUM SERPL-SCNC: 5 MMOL/L (ref 3.5–5.1)
RBC # BLD AUTO: 3.74 M/UL (ref 4–5.4)
SODIUM SERPL-SCNC: 143 MMOL/L (ref 136–145)
WBC # BLD AUTO: 13.09 K/UL (ref 3.9–12.7)

## 2021-02-11 PROCEDURE — 3075F SYST BP GE 130 - 139MM HG: CPT | Mod: CPTII,S$GLB,, | Performed by: STUDENT IN AN ORGANIZED HEALTH CARE EDUCATION/TRAINING PROGRAM

## 2021-02-11 PROCEDURE — 1159F PR MEDICATION LIST DOCUMENTED IN MEDICAL RECORD: ICD-10-PCS | Mod: S$GLB,,, | Performed by: STUDENT IN AN ORGANIZED HEALTH CARE EDUCATION/TRAINING PROGRAM

## 2021-02-11 PROCEDURE — 3288F PR FALLS RISK ASSESSMENT DOCUMENTED: ICD-10-PCS | Mod: CPTII,S$GLB,, | Performed by: STUDENT IN AN ORGANIZED HEALTH CARE EDUCATION/TRAINING PROGRAM

## 2021-02-11 PROCEDURE — 36415 COLL VENOUS BLD VENIPUNCTURE: CPT

## 2021-02-11 PROCEDURE — 3288F FALL RISK ASSESSMENT DOCD: CPT | Mod: CPTII,S$GLB,, | Performed by: STUDENT IN AN ORGANIZED HEALTH CARE EDUCATION/TRAINING PROGRAM

## 2021-02-11 PROCEDURE — 1159F MED LIST DOCD IN RCRD: CPT | Mod: S$GLB,,, | Performed by: STUDENT IN AN ORGANIZED HEALTH CARE EDUCATION/TRAINING PROGRAM

## 2021-02-11 PROCEDURE — 3075F PR MOST RECENT SYSTOLIC BLOOD PRESS GE 130-139MM HG: ICD-10-PCS | Mod: CPTII,S$GLB,, | Performed by: STUDENT IN AN ORGANIZED HEALTH CARE EDUCATION/TRAINING PROGRAM

## 2021-02-11 PROCEDURE — 1101F PT FALLS ASSESS-DOCD LE1/YR: CPT | Mod: CPTII,S$GLB,, | Performed by: STUDENT IN AN ORGANIZED HEALTH CARE EDUCATION/TRAINING PROGRAM

## 2021-02-11 PROCEDURE — 99213 OFFICE O/P EST LOW 20 MIN: CPT | Mod: S$GLB,,, | Performed by: STUDENT IN AN ORGANIZED HEALTH CARE EDUCATION/TRAINING PROGRAM

## 2021-02-11 PROCEDURE — 3008F BODY MASS INDEX DOCD: CPT | Mod: CPTII,S$GLB,, | Performed by: STUDENT IN AN ORGANIZED HEALTH CARE EDUCATION/TRAINING PROGRAM

## 2021-02-11 PROCEDURE — 99999 PR PBB SHADOW E&M-EST. PATIENT-LVL IV: CPT | Mod: PBBFAC,,, | Performed by: STUDENT IN AN ORGANIZED HEALTH CARE EDUCATION/TRAINING PROGRAM

## 2021-02-11 PROCEDURE — 3079F PR MOST RECENT DIASTOLIC BLOOD PRESSURE 80-89 MM HG: ICD-10-PCS | Mod: CPTII,S$GLB,, | Performed by: STUDENT IN AN ORGANIZED HEALTH CARE EDUCATION/TRAINING PROGRAM

## 2021-02-11 PROCEDURE — 1101F PR PT FALLS ASSESS DOC 0-1 FALLS W/OUT INJ PAST YR: ICD-10-PCS | Mod: CPTII,S$GLB,, | Performed by: STUDENT IN AN ORGANIZED HEALTH CARE EDUCATION/TRAINING PROGRAM

## 2021-02-11 PROCEDURE — 1126F PR PAIN SEVERITY QUANTIFIED, NO PAIN PRESENT: ICD-10-PCS | Mod: S$GLB,,, | Performed by: STUDENT IN AN ORGANIZED HEALTH CARE EDUCATION/TRAINING PROGRAM

## 2021-02-11 PROCEDURE — 80048 BASIC METABOLIC PNL TOTAL CA: CPT

## 2021-02-11 PROCEDURE — 3079F DIAST BP 80-89 MM HG: CPT | Mod: CPTII,S$GLB,, | Performed by: STUDENT IN AN ORGANIZED HEALTH CARE EDUCATION/TRAINING PROGRAM

## 2021-02-11 PROCEDURE — 1126F AMNT PAIN NOTED NONE PRSNT: CPT | Mod: S$GLB,,, | Performed by: STUDENT IN AN ORGANIZED HEALTH CARE EDUCATION/TRAINING PROGRAM

## 2021-02-11 PROCEDURE — 99999 PR PBB SHADOW E&M-EST. PATIENT-LVL IV: ICD-10-PCS | Mod: PBBFAC,,, | Performed by: STUDENT IN AN ORGANIZED HEALTH CARE EDUCATION/TRAINING PROGRAM

## 2021-02-11 PROCEDURE — 85025 COMPLETE CBC W/AUTO DIFF WBC: CPT

## 2021-02-11 PROCEDURE — 99213 PR OFFICE/OUTPT VISIT, EST, LEVL III, 20-29 MIN: ICD-10-PCS | Mod: S$GLB,,, | Performed by: STUDENT IN AN ORGANIZED HEALTH CARE EDUCATION/TRAINING PROGRAM

## 2021-02-11 PROCEDURE — 3008F PR BODY MASS INDEX (BMI) DOCUMENTED: ICD-10-PCS | Mod: CPTII,S$GLB,, | Performed by: STUDENT IN AN ORGANIZED HEALTH CARE EDUCATION/TRAINING PROGRAM

## 2021-02-11 RX ORDER — SEMAGLUTIDE 1.34 MG/ML
0.75 INJECTION, SOLUTION SUBCUTANEOUS
Qty: 2 SYRINGE | Refills: 0 | Status: SHIPPED | OUTPATIENT
Start: 2021-02-11 | End: 2021-03-11

## 2021-02-12 ENCOUNTER — DOCUMENTATION ONLY (OUTPATIENT)
Dept: BARIATRICS | Facility: CLINIC | Age: 71
End: 2021-02-12

## 2021-02-15 ENCOUNTER — TELEPHONE (OUTPATIENT)
Dept: ENDOSCOPY | Facility: HOSPITAL | Age: 71
End: 2021-02-15

## 2021-02-18 ENCOUNTER — TELEPHONE (OUTPATIENT)
Dept: ENDOSCOPY | Facility: HOSPITAL | Age: 71
End: 2021-02-18

## 2021-02-23 ENCOUNTER — ANESTHESIA EVENT (OUTPATIENT)
Dept: MEDSURG UNIT | Facility: HOSPITAL | Age: 71
End: 2021-02-23
Payer: MEDICARE

## 2021-02-23 ENCOUNTER — HOSPITAL ENCOUNTER (OUTPATIENT)
Dept: RADIOLOGY | Facility: OTHER | Age: 71
Discharge: HOME | End: 2021-02-23
Attending: INTERNAL MEDICINE
Payer: MEDICARE

## 2021-02-23 DIAGNOSIS — Z12.31 ENCOUNTER FOR SCREENING MAMMOGRAM FOR MALIGNANT NEOPLASM OF BREAST: ICD-10-CM

## 2021-02-23 PROCEDURE — 77067 MAMMO DIGITAL SCREENING BILAT WITH TOMO: ICD-10-PCS | Mod: 26,,, | Performed by: RADIOLOGY

## 2021-02-23 PROCEDURE — 77063 BREAST TOMOSYNTHESIS BI: CPT | Mod: 26,,, | Performed by: RADIOLOGY

## 2021-02-23 PROCEDURE — 77063 MAMMO DIGITAL SCREENING BILAT WITH TOMO: ICD-10-PCS | Mod: 26,,, | Performed by: RADIOLOGY

## 2021-02-23 PROCEDURE — 77067 SCR MAMMO BI INCL CAD: CPT | Mod: TC

## 2021-02-23 PROCEDURE — 77067 SCR MAMMO BI INCL CAD: CPT | Mod: 26,,, | Performed by: RADIOLOGY

## 2021-02-24 ENCOUNTER — ANESTHESIA (OUTPATIENT)
Dept: MEDSURG UNIT | Facility: HOSPITAL | Age: 71
End: 2021-02-24
Payer: MEDICARE

## 2021-02-24 ENCOUNTER — HOSPITAL ENCOUNTER (OUTPATIENT)
Facility: HOSPITAL | Age: 71
Discharge: HOME | End: 2021-02-24
Attending: INTERNAL MEDICINE | Admitting: INTERNAL MEDICINE
Payer: MEDICARE

## 2021-02-24 VITALS
OXYGEN SATURATION: 95 % | DIASTOLIC BLOOD PRESSURE: 78 MMHG | TEMPERATURE: 98 F | HEIGHT: 66 IN | SYSTOLIC BLOOD PRESSURE: 164 MMHG | RESPIRATION RATE: 16 BRPM | WEIGHT: 293 LBS | BODY MASS INDEX: 47.09 KG/M2 | HEART RATE: 76 BPM

## 2021-02-24 DIAGNOSIS — R94.39 POSITIVE CARDIAC STRESS TEST: ICD-10-CM

## 2021-02-24 DIAGNOSIS — Z01.818 PRE-OP EXAMINATION: ICD-10-CM

## 2021-02-24 LAB
ABO + RH BLD: NORMAL
ANION GAP SERPL CALC-SCNC: 5 MMOL/L (ref 8–16)
BASOPHILS # BLD AUTO: 0.04 K/UL (ref 0–0.2)
BASOPHILS NFR BLD: 0.4 % (ref 0–1.9)
BLD GP AB SCN CELLS X3 SERPL QL: NORMAL
BUN SERPL-MCNC: 19 MG/DL (ref 8–23)
CALCIUM SERPL-MCNC: 9.3 MG/DL (ref 8.7–10.5)
CHLORIDE SERPL-SCNC: 108 MMOL/L (ref 95–110)
CO2 SERPL-SCNC: 29 MMOL/L (ref 23–29)
CREAT SERPL-MCNC: 0.8 MG/DL (ref 0.5–1.4)
DIFFERENTIAL METHOD: ABNORMAL
EOSINOPHIL # BLD AUTO: 0.2 K/UL (ref 0–0.5)
EOSINOPHIL NFR BLD: 2.2 % (ref 0–8)
ERYTHROCYTE [DISTWIDTH] IN BLOOD BY AUTOMATED COUNT: 11.9 % (ref 11.5–14.5)
EST. GFR  (AFRICAN AMERICAN): >60 ML/MIN/1.73 M^2
EST. GFR  (NON AFRICAN AMERICAN): >60 ML/MIN/1.73 M^2
GLUCOSE SERPL-MCNC: 92 MG/DL (ref 70–110)
HCT VFR BLD AUTO: 37.4 % (ref 37–48.5)
HGB BLD-MCNC: 11.5 G/DL (ref 12–16)
IMM GRANULOCYTES # BLD AUTO: 0.04 K/UL (ref 0–0.04)
IMM GRANULOCYTES NFR BLD AUTO: 0.4 % (ref 0–0.5)
LYMPHOCYTES # BLD AUTO: 2.4 K/UL (ref 1–4.8)
LYMPHOCYTES NFR BLD: 24 % (ref 18–48)
MCH RBC QN AUTO: 31.8 PG (ref 27–31)
MCHC RBC AUTO-ENTMCNC: 30.7 G/DL (ref 32–36)
MCV RBC AUTO: 103 FL (ref 82–98)
MONOCYTES # BLD AUTO: 0.7 K/UL (ref 0.3–1)
MONOCYTES NFR BLD: 7.2 % (ref 4–15)
NEUTROPHILS # BLD AUTO: 6.5 K/UL (ref 1.8–7.7)
NEUTROPHILS NFR BLD: 65.8 % (ref 38–73)
NRBC BLD-RTO: 0 /100 WBC
PLATELET # BLD AUTO: 273 K/UL (ref 150–350)
PMV BLD AUTO: 10.6 FL (ref 9.2–12.9)
POTASSIUM SERPL-SCNC: 4.2 MMOL/L (ref 3.5–5.1)
RBC # BLD AUTO: 3.62 M/UL (ref 4–5.4)
SARS-COV-2 RDRP RESP QL NAA+PROBE: NEGATIVE
SODIUM SERPL-SCNC: 142 MMOL/L (ref 136–145)
WBC # BLD AUTO: 9.87 K/UL (ref 3.9–12.7)

## 2021-02-24 PROCEDURE — C1769 GUIDE WIRE: HCPCS | Performed by: INTERNAL MEDICINE

## 2021-02-24 PROCEDURE — 93010 ELECTROCARDIOGRAM REPORT: CPT | Mod: ,,, | Performed by: INTERNAL MEDICINE

## 2021-02-24 PROCEDURE — 25000003 PHARM REV CODE 250: Performed by: INTERNAL MEDICINE

## 2021-02-24 PROCEDURE — 80048 BASIC METABOLIC PNL TOTAL CA: CPT

## 2021-02-24 PROCEDURE — 93005 ELECTROCARDIOGRAM TRACING: CPT | Mod: 59

## 2021-02-24 PROCEDURE — 27201037 HC PRESSURE MONITORING SET UP

## 2021-02-24 PROCEDURE — D9220A PRA ANESTHESIA: Mod: ,,, | Performed by: ANESTHESIOLOGY

## 2021-02-24 PROCEDURE — 25500020 PHARM REV CODE 255: Performed by: INTERNAL MEDICINE

## 2021-02-24 PROCEDURE — 63600175 PHARM REV CODE 636 W HCPCS: Performed by: STUDENT IN AN ORGANIZED HEALTH CARE EDUCATION/TRAINING PROGRAM

## 2021-02-24 PROCEDURE — U0002 COVID-19 LAB TEST NON-CDC: HCPCS

## 2021-02-24 PROCEDURE — 25000003 PHARM REV CODE 250: Performed by: STUDENT IN AN ORGANIZED HEALTH CARE EDUCATION/TRAINING PROGRAM

## 2021-02-24 PROCEDURE — 86900 BLOOD TYPING SEROLOGIC ABO: CPT

## 2021-02-24 PROCEDURE — 93010 EKG 12-LEAD: ICD-10-PCS | Mod: ,,, | Performed by: INTERNAL MEDICINE

## 2021-02-24 PROCEDURE — 85025 COMPLETE CBC W/AUTO DIFF WBC: CPT

## 2021-02-24 PROCEDURE — 37000009 HC ANESTHESIA EA ADD 15 MINS: Performed by: INTERNAL MEDICINE

## 2021-02-24 PROCEDURE — C1887 CATHETER, GUIDING: HCPCS | Performed by: INTERNAL MEDICINE

## 2021-02-24 PROCEDURE — 93458 L HRT ARTERY/VENTRICLE ANGIO: CPT | Mod: 26,,, | Performed by: INTERNAL MEDICINE

## 2021-02-24 PROCEDURE — 99900035 HC TECH TIME PER 15 MIN (STAT)

## 2021-02-24 PROCEDURE — D9220A PRA ANESTHESIA: ICD-10-PCS | Mod: ,,, | Performed by: ANESTHESIOLOGY

## 2021-02-24 PROCEDURE — 37000008 HC ANESTHESIA 1ST 15 MINUTES: Performed by: INTERNAL MEDICINE

## 2021-02-24 PROCEDURE — C1894 INTRO/SHEATH, NON-LASER: HCPCS | Performed by: INTERNAL MEDICINE

## 2021-02-24 PROCEDURE — 93458 L HRT ARTERY/VENTRICLE ANGIO: CPT | Performed by: INTERNAL MEDICINE

## 2021-02-24 PROCEDURE — 93458 PR CATH PLACE/CORON ANGIO, IMG SUPER/INTERP,W LEFT HEART VENTRICULOGRAPHY: ICD-10-PCS | Mod: 26,,, | Performed by: INTERNAL MEDICINE

## 2021-02-24 PROCEDURE — 94761 N-INVAS EAR/PLS OXIMETRY MLT: CPT

## 2021-02-24 RX ORDER — HEPARIN SOD,PORCINE/0.9 % NACL 1000/500ML
INTRAVENOUS SOLUTION INTRAVENOUS
Status: DISCONTINUED | OUTPATIENT
Start: 2021-02-24 | End: 2021-02-24 | Stop reason: HOSPADM

## 2021-02-24 RX ORDER — SODIUM CHLORIDE 0.9 % (FLUSH) 0.9 %
10 SYRINGE (ML) INJECTION
Status: DISCONTINUED | OUTPATIENT
Start: 2021-02-24 | End: 2021-02-24 | Stop reason: HOSPADM

## 2021-02-24 RX ORDER — ONDANSETRON 8 MG/1
8 TABLET, ORALLY DISINTEGRATING ORAL EVERY 8 HOURS PRN
Status: DISCONTINUED | OUTPATIENT
Start: 2021-02-24 | End: 2021-02-24 | Stop reason: HOSPADM

## 2021-02-24 RX ORDER — DIPHENHYDRAMINE HCL 50 MG
50 CAPSULE ORAL ONCE
Status: COMPLETED | OUTPATIENT
Start: 2021-02-24 | End: 2021-02-24

## 2021-02-24 RX ORDER — SODIUM CHLORIDE 9 MG/ML
INJECTION, SOLUTION INTRAVENOUS ONCE
Status: COMPLETED | OUTPATIENT
Start: 2021-02-24 | End: 2021-02-24

## 2021-02-24 RX ORDER — HEPARIN SODIUM 1000 [USP'U]/ML
INJECTION, SOLUTION INTRAVENOUS; SUBCUTANEOUS
Status: DISCONTINUED | OUTPATIENT
Start: 2021-02-24 | End: 2021-02-25

## 2021-02-24 RX ORDER — ACETAMINOPHEN 325 MG/1
650 TABLET ORAL EVERY 4 HOURS PRN
Status: DISCONTINUED | OUTPATIENT
Start: 2021-02-24 | End: 2021-02-24 | Stop reason: HOSPADM

## 2021-02-24 RX ORDER — SODIUM CHLORIDE 9 MG/ML
INJECTION, SOLUTION INTRAVENOUS CONTINUOUS
Status: ACTIVE | OUTPATIENT
Start: 2021-02-24 | End: 2021-02-24

## 2021-02-24 RX ORDER — PROPOFOL 10 MG/ML
VIAL (ML) INTRAVENOUS
Status: DISCONTINUED | OUTPATIENT
Start: 2021-02-24 | End: 2021-02-25

## 2021-02-24 RX ORDER — NITROGLYCERIN 5 MG/ML
INJECTION, SOLUTION INTRAVENOUS
Status: DISCONTINUED | OUTPATIENT
Start: 2021-02-24 | End: 2021-02-24 | Stop reason: HOSPADM

## 2021-02-24 RX ORDER — LIDOCAINE HYDROCHLORIDE 20 MG/ML
INJECTION, SOLUTION INFILTRATION; PERINEURAL
Status: DISCONTINUED | OUTPATIENT
Start: 2021-02-24 | End: 2021-02-24 | Stop reason: HOSPADM

## 2021-02-24 RX ADMIN — SODIUM CHLORIDE: 0.9 INJECTION, SOLUTION INTRAVENOUS at 12:02

## 2021-02-24 RX ADMIN — PROPOFOL 50 MCG/KG/MIN: 10 INJECTION, EMULSION INTRAVENOUS at 11:02

## 2021-02-24 RX ADMIN — DIPHENHYDRAMINE HYDROCHLORIDE 50 MG: 50 CAPSULE ORAL at 10:02

## 2021-02-24 RX ADMIN — HEPARIN SODIUM 5000 UNITS: 1000 INJECTION, SOLUTION INTRAVENOUS; SUBCUTANEOUS at 11:02

## 2021-02-24 RX ADMIN — SODIUM CHLORIDE: 9 INJECTION, SOLUTION INTRAVENOUS at 11:02

## 2021-02-26 DIAGNOSIS — G89.4 CHRONIC PAIN DISORDER: ICD-10-CM

## 2021-02-26 DIAGNOSIS — M17.12 PRIMARY OSTEOARTHRITIS OF LEFT KNEE: ICD-10-CM

## 2021-02-26 DIAGNOSIS — G89.29 CHRONIC PAIN OF LEFT KNEE: ICD-10-CM

## 2021-02-26 DIAGNOSIS — Z51.81 ENCOUNTER FOR MONITORING OPIOID MAINTENANCE THERAPY: ICD-10-CM

## 2021-02-26 DIAGNOSIS — Z79.891 ENCOUNTER FOR MONITORING OPIOID MAINTENANCE THERAPY: ICD-10-CM

## 2021-02-26 DIAGNOSIS — M25.562 CHRONIC PAIN OF LEFT KNEE: ICD-10-CM

## 2021-02-26 RX ORDER — OXYCODONE AND ACETAMINOPHEN 5; 325 MG/1; MG/1
1 TABLET ORAL EVERY 8 HOURS PRN
Qty: 90 TABLET | Refills: 0 | Status: SHIPPED | OUTPATIENT
Start: 2021-02-28 | End: 2021-03-24 | Stop reason: SDUPTHER

## 2021-02-26 RX ORDER — GABAPENTIN 300 MG/1
600 CAPSULE ORAL 3 TIMES DAILY
Qty: 540 CAPSULE | Refills: 1 | Status: SHIPPED | OUTPATIENT
Start: 2021-02-26 | End: 2021-06-01 | Stop reason: SDUPTHER

## 2021-03-01 ENCOUNTER — TELEPHONE (OUTPATIENT)
Dept: ENDOSCOPY | Facility: HOSPITAL | Age: 71
End: 2021-03-01

## 2021-03-08 ENCOUNTER — TELEPHONE (OUTPATIENT)
Dept: ENDOSCOPY | Facility: HOSPITAL | Age: 71
End: 2021-03-08

## 2021-03-08 DIAGNOSIS — Z01.818 PRE-OP TESTING: ICD-10-CM

## 2021-03-09 ENCOUNTER — PATIENT OUTREACH (OUTPATIENT)
Dept: ADMINISTRATIVE | Facility: OTHER | Age: 71
End: 2021-03-09

## 2021-03-09 DIAGNOSIS — Z12.11 COLON CANCER SCREENING: Primary | ICD-10-CM

## 2021-03-11 ENCOUNTER — DOCUMENTATION ONLY (OUTPATIENT)
Dept: BARIATRICS | Facility: CLINIC | Age: 71
End: 2021-03-11

## 2021-03-11 ENCOUNTER — OFFICE VISIT (OUTPATIENT)
Dept: BARIATRICS | Facility: CLINIC | Age: 71
End: 2021-03-11
Payer: MEDICARE

## 2021-03-11 VITALS
SYSTOLIC BLOOD PRESSURE: 112 MMHG | WEIGHT: 293 LBS | HEART RATE: 73 BPM | BODY MASS INDEX: 57.68 KG/M2 | DIASTOLIC BLOOD PRESSURE: 60 MMHG | OXYGEN SATURATION: 98 %

## 2021-03-11 DIAGNOSIS — E66.01 CLASS 3 SEVERE OBESITY DUE TO EXCESS CALORIES WITH SERIOUS COMORBIDITY AND BODY MASS INDEX (BMI) OF 50.0 TO 59.9 IN ADULT: ICD-10-CM

## 2021-03-11 DIAGNOSIS — R53.81 DEBILITY: ICD-10-CM

## 2021-03-11 DIAGNOSIS — K21.9 GASTROESOPHAGEAL REFLUX DISEASE WITHOUT ESOPHAGITIS: ICD-10-CM

## 2021-03-11 DIAGNOSIS — Z72.0 TOBACCO ABUSE: Primary | Chronic | ICD-10-CM

## 2021-03-11 DIAGNOSIS — M17.12 PRIMARY OSTEOARTHRITIS OF LEFT KNEE: ICD-10-CM

## 2021-03-11 DIAGNOSIS — E78.00 PURE HYPERCHOLESTEROLEMIA: Chronic | ICD-10-CM

## 2021-03-11 DIAGNOSIS — G47.33 OBSTRUCTIVE SLEEP APNEA: ICD-10-CM

## 2021-03-11 DIAGNOSIS — R06.09 DYSPNEA ON EXERTION: ICD-10-CM

## 2021-03-11 DIAGNOSIS — I10 ESSENTIAL HYPERTENSION: ICD-10-CM

## 2021-03-11 PROCEDURE — 1159F PR MEDICATION LIST DOCUMENTED IN MEDICAL RECORD: ICD-10-PCS | Mod: S$GLB,,, | Performed by: STUDENT IN AN ORGANIZED HEALTH CARE EDUCATION/TRAINING PROGRAM

## 2021-03-11 PROCEDURE — 1126F AMNT PAIN NOTED NONE PRSNT: CPT | Mod: S$GLB,,, | Performed by: STUDENT IN AN ORGANIZED HEALTH CARE EDUCATION/TRAINING PROGRAM

## 2021-03-11 PROCEDURE — 1126F PR PAIN SEVERITY QUANTIFIED, NO PAIN PRESENT: ICD-10-PCS | Mod: S$GLB,,, | Performed by: STUDENT IN AN ORGANIZED HEALTH CARE EDUCATION/TRAINING PROGRAM

## 2021-03-11 PROCEDURE — 99999 PR PBB SHADOW E&M-EST. PATIENT-LVL III: ICD-10-PCS | Mod: PBBFAC,,, | Performed by: STUDENT IN AN ORGANIZED HEALTH CARE EDUCATION/TRAINING PROGRAM

## 2021-03-11 PROCEDURE — 1101F PT FALLS ASSESS-DOCD LE1/YR: CPT | Mod: CPTII,S$GLB,, | Performed by: STUDENT IN AN ORGANIZED HEALTH CARE EDUCATION/TRAINING PROGRAM

## 2021-03-11 PROCEDURE — 99213 PR OFFICE/OUTPT VISIT, EST, LEVL III, 20-29 MIN: ICD-10-PCS | Mod: S$GLB,,, | Performed by: STUDENT IN AN ORGANIZED HEALTH CARE EDUCATION/TRAINING PROGRAM

## 2021-03-11 PROCEDURE — 3078F DIAST BP <80 MM HG: CPT | Mod: CPTII,S$GLB,, | Performed by: STUDENT IN AN ORGANIZED HEALTH CARE EDUCATION/TRAINING PROGRAM

## 2021-03-11 PROCEDURE — 99999 PR PBB SHADOW E&M-EST. PATIENT-LVL III: CPT | Mod: PBBFAC,,, | Performed by: STUDENT IN AN ORGANIZED HEALTH CARE EDUCATION/TRAINING PROGRAM

## 2021-03-11 PROCEDURE — 3008F PR BODY MASS INDEX (BMI) DOCUMENTED: ICD-10-PCS | Mod: CPTII,S$GLB,, | Performed by: STUDENT IN AN ORGANIZED HEALTH CARE EDUCATION/TRAINING PROGRAM

## 2021-03-11 PROCEDURE — 3078F PR MOST RECENT DIASTOLIC BLOOD PRESSURE < 80 MM HG: ICD-10-PCS | Mod: CPTII,S$GLB,, | Performed by: STUDENT IN AN ORGANIZED HEALTH CARE EDUCATION/TRAINING PROGRAM

## 2021-03-11 PROCEDURE — 3074F SYST BP LT 130 MM HG: CPT | Mod: CPTII,S$GLB,, | Performed by: STUDENT IN AN ORGANIZED HEALTH CARE EDUCATION/TRAINING PROGRAM

## 2021-03-11 PROCEDURE — 3008F BODY MASS INDEX DOCD: CPT | Mod: CPTII,S$GLB,, | Performed by: STUDENT IN AN ORGANIZED HEALTH CARE EDUCATION/TRAINING PROGRAM

## 2021-03-11 PROCEDURE — 3288F FALL RISK ASSESSMENT DOCD: CPT | Mod: CPTII,S$GLB,, | Performed by: STUDENT IN AN ORGANIZED HEALTH CARE EDUCATION/TRAINING PROGRAM

## 2021-03-11 PROCEDURE — 1101F PR PT FALLS ASSESS DOC 0-1 FALLS W/OUT INJ PAST YR: ICD-10-PCS | Mod: CPTII,S$GLB,, | Performed by: STUDENT IN AN ORGANIZED HEALTH CARE EDUCATION/TRAINING PROGRAM

## 2021-03-11 PROCEDURE — 3288F PR FALLS RISK ASSESSMENT DOCUMENTED: ICD-10-PCS | Mod: CPTII,S$GLB,, | Performed by: STUDENT IN AN ORGANIZED HEALTH CARE EDUCATION/TRAINING PROGRAM

## 2021-03-11 PROCEDURE — 3074F PR MOST RECENT SYSTOLIC BLOOD PRESSURE < 130 MM HG: ICD-10-PCS | Mod: CPTII,S$GLB,, | Performed by: STUDENT IN AN ORGANIZED HEALTH CARE EDUCATION/TRAINING PROGRAM

## 2021-03-11 PROCEDURE — 99213 OFFICE O/P EST LOW 20 MIN: CPT | Mod: S$GLB,,, | Performed by: STUDENT IN AN ORGANIZED HEALTH CARE EDUCATION/TRAINING PROGRAM

## 2021-03-11 PROCEDURE — 1159F MED LIST DOCD IN RCRD: CPT | Mod: S$GLB,,, | Performed by: STUDENT IN AN ORGANIZED HEALTH CARE EDUCATION/TRAINING PROGRAM

## 2021-03-17 ENCOUNTER — HOSPITAL ENCOUNTER (OUTPATIENT)
Facility: HOSPITAL | Age: 71
Discharge: HOME | End: 2021-03-17
Attending: INTERNAL MEDICINE | Admitting: INTERNAL MEDICINE
Payer: MEDICARE

## 2021-03-17 ENCOUNTER — ANESTHESIA (OUTPATIENT)
Dept: ENDOSCOPY | Facility: HOSPITAL | Age: 71
End: 2021-03-17
Payer: MEDICARE

## 2021-03-17 ENCOUNTER — ANESTHESIA EVENT (OUTPATIENT)
Dept: ENDOSCOPY | Facility: HOSPITAL | Age: 71
End: 2021-03-17
Payer: MEDICARE

## 2021-03-17 VITALS
WEIGHT: 293 LBS | HEART RATE: 73 BPM | SYSTOLIC BLOOD PRESSURE: 154 MMHG | OXYGEN SATURATION: 100 % | RESPIRATION RATE: 20 BRPM | HEIGHT: 66 IN | BODY MASS INDEX: 47.09 KG/M2 | TEMPERATURE: 98 F | DIASTOLIC BLOOD PRESSURE: 72 MMHG

## 2021-03-17 DIAGNOSIS — K25.7 CHRONIC GASTRIC ULCER, UNSPECIFIED WHETHER GASTRIC ULCER HEMORRHAGE OR PERFORATION PRESENT: Primary | ICD-10-CM

## 2021-03-17 DIAGNOSIS — K25.9 GASTRIC ULCER: ICD-10-CM

## 2021-03-17 DIAGNOSIS — K31.89 GASTRIC MASS: ICD-10-CM

## 2021-03-17 LAB — SARS-COV-2 RDRP RESP QL NAA+PROBE: NEGATIVE

## 2021-03-17 PROCEDURE — 88342 IMHCHEM/IMCYTCHM 1ST ANTB: CPT | Mod: 26,,, | Performed by: PATHOLOGY

## 2021-03-17 PROCEDURE — 43259 PR ENDOSCOPIC ULTRASOUND EXAM: ICD-10-PCS | Mod: ,,, | Performed by: INTERNAL MEDICINE

## 2021-03-17 PROCEDURE — 37000009 HC ANESTHESIA EA ADD 15 MINS: Performed by: INTERNAL MEDICINE

## 2021-03-17 PROCEDURE — 63600175 PHARM REV CODE 636 W HCPCS: Performed by: NURSE ANESTHETIST, CERTIFIED REGISTERED

## 2021-03-17 PROCEDURE — 88342 IMHCHEM/IMCYTCHM 1ST ANTB: CPT | Performed by: PATHOLOGY

## 2021-03-17 PROCEDURE — D9220A PRA ANESTHESIA: Mod: CRNA,,, | Performed by: NURSE ANESTHETIST, CERTIFIED REGISTERED

## 2021-03-17 PROCEDURE — 88341 IMHCHEM/IMCYTCHM EA ADD ANTB: CPT | Mod: 26,,, | Performed by: PATHOLOGY

## 2021-03-17 PROCEDURE — 43239 EGD BIOPSY SINGLE/MULTIPLE: CPT | Mod: 59,,, | Performed by: INTERNAL MEDICINE

## 2021-03-17 PROCEDURE — 43259 EGD US EXAM DUODENUM/JEJUNUM: CPT | Performed by: INTERNAL MEDICINE

## 2021-03-17 PROCEDURE — 88341 IMHCHEM/IMCYTCHM EA ADD ANTB: CPT | Mod: 59 | Performed by: PATHOLOGY

## 2021-03-17 PROCEDURE — 43259 EGD US EXAM DUODENUM/JEJUNUM: CPT | Mod: ,,, | Performed by: INTERNAL MEDICINE

## 2021-03-17 PROCEDURE — 43239 EGD BIOPSY SINGLE/MULTIPLE: CPT | Performed by: INTERNAL MEDICINE

## 2021-03-17 PROCEDURE — D9220A PRA ANESTHESIA: Mod: ANES,,, | Performed by: ANESTHESIOLOGY

## 2021-03-17 PROCEDURE — 25000003 PHARM REV CODE 250: Performed by: NURSE ANESTHETIST, CERTIFIED REGISTERED

## 2021-03-17 PROCEDURE — D9220A PRA ANESTHESIA: ICD-10-PCS | Mod: ANES,,, | Performed by: ANESTHESIOLOGY

## 2021-03-17 PROCEDURE — 88342 CHG IMMUNOCYTOCHEMISTRY: ICD-10-PCS | Mod: 26,,, | Performed by: PATHOLOGY

## 2021-03-17 PROCEDURE — 88305 TISSUE EXAM BY PATHOLOGIST: CPT | Mod: 26,,, | Performed by: PATHOLOGY

## 2021-03-17 PROCEDURE — U0002 COVID-19 LAB TEST NON-CDC: HCPCS | Performed by: INTERNAL MEDICINE

## 2021-03-17 PROCEDURE — 27201012 HC FORCEPS, HOT/COLD, DISP: Performed by: INTERNAL MEDICINE

## 2021-03-17 PROCEDURE — D9220A PRA ANESTHESIA: ICD-10-PCS | Mod: CRNA,,, | Performed by: NURSE ANESTHETIST, CERTIFIED REGISTERED

## 2021-03-17 PROCEDURE — 88341 PR IHC OR ICC EACH ADD'L SINGLE ANTIBODY  STAINPR: ICD-10-PCS | Mod: 26,,, | Performed by: PATHOLOGY

## 2021-03-17 PROCEDURE — 43239 PR EGD, FLEX, W/BIOPSY, SGL/MULTI: ICD-10-PCS | Mod: 59,,, | Performed by: INTERNAL MEDICINE

## 2021-03-17 PROCEDURE — 25000003 PHARM REV CODE 250: Performed by: ANESTHESIOLOGY

## 2021-03-17 PROCEDURE — 88305 TISSUE EXAM BY PATHOLOGIST: CPT | Performed by: PATHOLOGY

## 2021-03-17 PROCEDURE — 88305 TISSUE EXAM BY PATHOLOGIST: ICD-10-PCS | Mod: 26,,, | Performed by: PATHOLOGY

## 2021-03-17 PROCEDURE — 37000008 HC ANESTHESIA 1ST 15 MINUTES: Performed by: INTERNAL MEDICINE

## 2021-03-17 RX ORDER — PROPOFOL 10 MG/ML
VIAL (ML) INTRAVENOUS CONTINUOUS PRN
Status: DISCONTINUED | OUTPATIENT
Start: 2021-03-17 | End: 2021-03-17

## 2021-03-17 RX ORDER — SODIUM CHLORIDE 0.9 % (FLUSH) 0.9 %
10 SYRINGE (ML) INJECTION
Status: DISCONTINUED | OUTPATIENT
Start: 2021-03-17 | End: 2021-03-17 | Stop reason: HOSPADM

## 2021-03-17 RX ORDER — SODIUM CHLORIDE 0.9 % (FLUSH) 0.9 %
3 SYRINGE (ML) INJECTION
Status: DISCONTINUED | OUTPATIENT
Start: 2021-03-17 | End: 2021-03-17 | Stop reason: HOSPADM

## 2021-03-17 RX ORDER — HYDROMORPHONE HYDROCHLORIDE 1 MG/ML
0.2 INJECTION, SOLUTION INTRAMUSCULAR; INTRAVENOUS; SUBCUTANEOUS EVERY 5 MIN PRN
Status: DISCONTINUED | OUTPATIENT
Start: 2021-03-17 | End: 2021-03-17 | Stop reason: HOSPADM

## 2021-03-17 RX ORDER — FENTANYL CITRATE 50 UG/ML
INJECTION, SOLUTION INTRAMUSCULAR; INTRAVENOUS
Status: DISCONTINUED | OUTPATIENT
Start: 2021-03-17 | End: 2021-03-17

## 2021-03-17 RX ORDER — PROPOFOL 10 MG/ML
VIAL (ML) INTRAVENOUS
Status: DISCONTINUED | OUTPATIENT
Start: 2021-03-17 | End: 2021-03-17

## 2021-03-17 RX ORDER — LIDOCAINE HYDROCHLORIDE 20 MG/ML
INJECTION, SOLUTION EPIDURAL; INFILTRATION; INTRACAUDAL; PERINEURAL
Status: DISCONTINUED | OUTPATIENT
Start: 2021-03-17 | End: 2021-03-17

## 2021-03-17 RX ADMIN — SODIUM CHLORIDE: 0.9 INJECTION, SOLUTION INTRAVENOUS at 10:03

## 2021-03-17 RX ADMIN — PROPOFOL 60 MG: 10 INJECTION, EMULSION INTRAVENOUS at 10:03

## 2021-03-17 RX ADMIN — PROPOFOL 200 MCG/KG/MIN: 10 INJECTION, EMULSION INTRAVENOUS at 10:03

## 2021-03-17 RX ADMIN — LIDOCAINE HYDROCHLORIDE 100 MG: 20 INJECTION, SOLUTION EPIDURAL; INFILTRATION; INTRACAUDAL at 10:03

## 2021-03-17 RX ADMIN — FENTANYL CITRATE 25 MCG: 50 INJECTION INTRAMUSCULAR; INTRAVENOUS at 10:03

## 2021-03-23 ENCOUNTER — TELEPHONE (OUTPATIENT)
Dept: PAIN MEDICINE | Facility: CLINIC | Age: 71
End: 2021-03-23

## 2021-03-24 ENCOUNTER — OFFICE VISIT (OUTPATIENT)
Dept: PAIN MEDICINE | Facility: CLINIC | Age: 71
End: 2021-03-24
Payer: MEDICARE

## 2021-03-24 VITALS
SYSTOLIC BLOOD PRESSURE: 117 MMHG | HEART RATE: 70 BPM | RESPIRATION RATE: 18 BRPM | HEIGHT: 66 IN | TEMPERATURE: 98 F | BODY MASS INDEX: 47.09 KG/M2 | DIASTOLIC BLOOD PRESSURE: 71 MMHG | WEIGHT: 293 LBS

## 2021-03-24 DIAGNOSIS — Z12.11 SPECIAL SCREENING FOR MALIGNANT NEOPLASM OF COLON: Primary | ICD-10-CM

## 2021-03-24 DIAGNOSIS — Z51.81 ENCOUNTER FOR MONITORING OPIOID MAINTENANCE THERAPY: ICD-10-CM

## 2021-03-24 DIAGNOSIS — Z79.891 ENCOUNTER FOR MONITORING OPIOID MAINTENANCE THERAPY: ICD-10-CM

## 2021-03-24 DIAGNOSIS — G89.4 CHRONIC PAIN DISORDER: Primary | ICD-10-CM

## 2021-03-24 DIAGNOSIS — G89.29 CHRONIC PAIN OF LEFT KNEE: ICD-10-CM

## 2021-03-24 DIAGNOSIS — M25.562 CHRONIC PAIN OF LEFT KNEE: ICD-10-CM

## 2021-03-24 DIAGNOSIS — M17.12 PRIMARY OSTEOARTHRITIS OF LEFT KNEE: ICD-10-CM

## 2021-03-24 PROCEDURE — 1159F PR MEDICATION LIST DOCUMENTED IN MEDICAL RECORD: ICD-10-PCS | Mod: S$GLB,,, | Performed by: NURSE PRACTITIONER

## 2021-03-24 PROCEDURE — 1101F PT FALLS ASSESS-DOCD LE1/YR: CPT | Mod: CPTII,S$GLB,, | Performed by: NURSE PRACTITIONER

## 2021-03-24 PROCEDURE — 3288F PR FALLS RISK ASSESSMENT DOCUMENTED: ICD-10-PCS | Mod: CPTII,S$GLB,, | Performed by: NURSE PRACTITIONER

## 2021-03-24 PROCEDURE — 3078F PR MOST RECENT DIASTOLIC BLOOD PRESSURE < 80 MM HG: ICD-10-PCS | Mod: CPTII,S$GLB,, | Performed by: NURSE PRACTITIONER

## 2021-03-24 PROCEDURE — 1159F MED LIST DOCD IN RCRD: CPT | Mod: S$GLB,,, | Performed by: NURSE PRACTITIONER

## 2021-03-24 PROCEDURE — 99999 PR PBB SHADOW E&M-EST. PATIENT-LVL IV: CPT | Mod: PBBFAC,,, | Performed by: NURSE PRACTITIONER

## 2021-03-24 PROCEDURE — 99999 PR PBB SHADOW E&M-EST. PATIENT-LVL IV: ICD-10-PCS | Mod: PBBFAC,,, | Performed by: NURSE PRACTITIONER

## 2021-03-24 PROCEDURE — 99213 PR OFFICE/OUTPT VISIT, EST, LEVL III, 20-29 MIN: ICD-10-PCS | Mod: S$GLB,,, | Performed by: NURSE PRACTITIONER

## 2021-03-24 PROCEDURE — 1126F PR PAIN SEVERITY QUANTIFIED, NO PAIN PRESENT: ICD-10-PCS | Mod: S$GLB,,, | Performed by: NURSE PRACTITIONER

## 2021-03-24 PROCEDURE — 3008F BODY MASS INDEX DOCD: CPT | Mod: CPTII,S$GLB,, | Performed by: NURSE PRACTITIONER

## 2021-03-24 PROCEDURE — 1126F AMNT PAIN NOTED NONE PRSNT: CPT | Mod: S$GLB,,, | Performed by: NURSE PRACTITIONER

## 2021-03-24 PROCEDURE — 3078F DIAST BP <80 MM HG: CPT | Mod: CPTII,S$GLB,, | Performed by: NURSE PRACTITIONER

## 2021-03-24 PROCEDURE — 3008F PR BODY MASS INDEX (BMI) DOCUMENTED: ICD-10-PCS | Mod: CPTII,S$GLB,, | Performed by: NURSE PRACTITIONER

## 2021-03-24 PROCEDURE — 3288F FALL RISK ASSESSMENT DOCD: CPT | Mod: CPTII,S$GLB,, | Performed by: NURSE PRACTITIONER

## 2021-03-24 PROCEDURE — 3074F SYST BP LT 130 MM HG: CPT | Mod: CPTII,S$GLB,, | Performed by: NURSE PRACTITIONER

## 2021-03-24 PROCEDURE — 99213 OFFICE O/P EST LOW 20 MIN: CPT | Mod: S$GLB,,, | Performed by: NURSE PRACTITIONER

## 2021-03-24 PROCEDURE — 1101F PR PT FALLS ASSESS DOC 0-1 FALLS W/OUT INJ PAST YR: ICD-10-PCS | Mod: CPTII,S$GLB,, | Performed by: NURSE PRACTITIONER

## 2021-03-24 PROCEDURE — 3074F PR MOST RECENT SYSTOLIC BLOOD PRESSURE < 130 MM HG: ICD-10-PCS | Mod: CPTII,S$GLB,, | Performed by: NURSE PRACTITIONER

## 2021-03-24 RX ORDER — OXYCODONE AND ACETAMINOPHEN 5; 325 MG/1; MG/1
1 TABLET ORAL EVERY 8 HOURS PRN
Qty: 90 TABLET | Refills: 0 | Status: SHIPPED | OUTPATIENT
Start: 2021-03-24 | End: 2021-04-23 | Stop reason: SDUPTHER

## 2021-03-30 LAB
COMMENT: NORMAL
FINAL PATHOLOGIC DIAGNOSIS: NORMAL
GROSS: NORMAL
Lab: NORMAL

## 2021-03-31 ENCOUNTER — TELEPHONE (OUTPATIENT)
Dept: ENDOSCOPY | Facility: HOSPITAL | Age: 71
End: 2021-03-31

## 2021-04-16 ENCOUNTER — TELEPHONE (OUTPATIENT)
Dept: ENDOSCOPY | Facility: HOSPITAL | Age: 71
End: 2021-04-16

## 2021-04-19 DIAGNOSIS — K31.9 GASTRIC LESION: Primary | ICD-10-CM

## 2021-04-22 ENCOUNTER — OFFICE VISIT (OUTPATIENT)
Dept: GASTROENTEROLOGY | Facility: CLINIC | Age: 71
End: 2021-04-22
Payer: MEDICARE

## 2021-04-22 ENCOUNTER — TELEPHONE (OUTPATIENT)
Dept: GASTROENTEROLOGY | Facility: CLINIC | Age: 71
End: 2021-04-22

## 2021-04-22 VITALS
HEART RATE: 75 BPM | BODY MASS INDEX: 47.09 KG/M2 | WEIGHT: 293 LBS | HEIGHT: 66 IN | SYSTOLIC BLOOD PRESSURE: 116 MMHG | DIASTOLIC BLOOD PRESSURE: 69 MMHG

## 2021-04-22 DIAGNOSIS — K31.9 GASTRIC LESION: Primary | ICD-10-CM

## 2021-04-22 PROCEDURE — 99212 PR OFFICE/OUTPT VISIT, EST, LEVL II, 10-19 MIN: ICD-10-PCS | Mod: S$GLB,,, | Performed by: INTERNAL MEDICINE

## 2021-04-22 PROCEDURE — 99212 OFFICE O/P EST SF 10 MIN: CPT | Mod: S$GLB,,, | Performed by: INTERNAL MEDICINE

## 2021-04-22 PROCEDURE — 3288F FALL RISK ASSESSMENT DOCD: CPT | Mod: CPTII,S$GLB,, | Performed by: INTERNAL MEDICINE

## 2021-04-22 PROCEDURE — 99999 PR PBB SHADOW E&M-EST. PATIENT-LVL III: CPT | Mod: PBBFAC,,, | Performed by: INTERNAL MEDICINE

## 2021-04-22 PROCEDURE — 1126F PR PAIN SEVERITY QUANTIFIED, NO PAIN PRESENT: ICD-10-PCS | Mod: S$GLB,,, | Performed by: INTERNAL MEDICINE

## 2021-04-22 PROCEDURE — 99999 PR PBB SHADOW E&M-EST. PATIENT-LVL III: ICD-10-PCS | Mod: PBBFAC,,, | Performed by: INTERNAL MEDICINE

## 2021-04-22 PROCEDURE — 1126F AMNT PAIN NOTED NONE PRSNT: CPT | Mod: S$GLB,,, | Performed by: INTERNAL MEDICINE

## 2021-04-22 PROCEDURE — 1159F MED LIST DOCD IN RCRD: CPT | Mod: S$GLB,,, | Performed by: INTERNAL MEDICINE

## 2021-04-22 PROCEDURE — 3008F BODY MASS INDEX DOCD: CPT | Mod: CPTII,S$GLB,, | Performed by: INTERNAL MEDICINE

## 2021-04-22 PROCEDURE — 1159F PR MEDICATION LIST DOCUMENTED IN MEDICAL RECORD: ICD-10-PCS | Mod: S$GLB,,, | Performed by: INTERNAL MEDICINE

## 2021-04-22 PROCEDURE — 3288F PR FALLS RISK ASSESSMENT DOCUMENTED: ICD-10-PCS | Mod: CPTII,S$GLB,, | Performed by: INTERNAL MEDICINE

## 2021-04-22 PROCEDURE — 1101F PR PT FALLS ASSESS DOC 0-1 FALLS W/OUT INJ PAST YR: ICD-10-PCS | Mod: CPTII,S$GLB,, | Performed by: INTERNAL MEDICINE

## 2021-04-22 PROCEDURE — 1101F PT FALLS ASSESS-DOCD LE1/YR: CPT | Mod: CPTII,S$GLB,, | Performed by: INTERNAL MEDICINE

## 2021-04-22 PROCEDURE — 3008F PR BODY MASS INDEX (BMI) DOCUMENTED: ICD-10-PCS | Mod: CPTII,S$GLB,, | Performed by: INTERNAL MEDICINE

## 2021-04-23 DIAGNOSIS — Z51.81 ENCOUNTER FOR MONITORING OPIOID MAINTENANCE THERAPY: ICD-10-CM

## 2021-04-23 DIAGNOSIS — Z79.891 ENCOUNTER FOR MONITORING OPIOID MAINTENANCE THERAPY: ICD-10-CM

## 2021-04-23 RX ORDER — OXYCODONE AND ACETAMINOPHEN 5; 325 MG/1; MG/1
1 TABLET ORAL EVERY 8 HOURS PRN
Qty: 90 TABLET | Refills: 0 | Status: SHIPPED | OUTPATIENT
Start: 2021-04-24 | End: 2021-05-21 | Stop reason: SDUPTHER

## 2021-05-10 ENCOUNTER — PATIENT OUTREACH (OUTPATIENT)
Dept: ADMINISTRATIVE | Facility: HOSPITAL | Age: 71
End: 2021-05-10

## 2021-05-18 ENCOUNTER — TELEPHONE (OUTPATIENT)
Dept: ENDOSCOPY | Facility: HOSPITAL | Age: 71
End: 2021-05-18

## 2021-05-21 DIAGNOSIS — Z51.81 ENCOUNTER FOR MONITORING OPIOID MAINTENANCE THERAPY: ICD-10-CM

## 2021-05-21 DIAGNOSIS — Z79.891 ENCOUNTER FOR MONITORING OPIOID MAINTENANCE THERAPY: ICD-10-CM

## 2021-05-21 RX ORDER — OXYCODONE AND ACETAMINOPHEN 5; 325 MG/1; MG/1
1 TABLET ORAL EVERY 8 HOURS PRN
Qty: 90 TABLET | Refills: 0 | Status: SHIPPED | OUTPATIENT
Start: 2021-05-23 | End: 2021-06-22

## 2021-05-24 ENCOUNTER — TELEPHONE (OUTPATIENT)
Dept: ENDOSCOPY | Facility: HOSPITAL | Age: 71
End: 2021-05-24

## 2021-05-25 ENCOUNTER — ANESTHESIA EVENT (OUTPATIENT)
Dept: ENDOSCOPY | Facility: HOSPITAL | Age: 71
End: 2021-05-25
Payer: MEDICARE

## 2021-05-25 ENCOUNTER — HOSPITAL ENCOUNTER (OUTPATIENT)
Facility: HOSPITAL | Age: 71
Discharge: HOME | End: 2021-05-25
Attending: INTERNAL MEDICINE | Admitting: INTERNAL MEDICINE
Payer: MEDICARE

## 2021-05-25 ENCOUNTER — ANESTHESIA (OUTPATIENT)
Dept: ENDOSCOPY | Facility: HOSPITAL | Age: 71
End: 2021-05-25
Payer: MEDICARE

## 2021-05-25 VITALS
DIASTOLIC BLOOD PRESSURE: 63 MMHG | RESPIRATION RATE: 18 BRPM | BODY MASS INDEX: 47.09 KG/M2 | WEIGHT: 293 LBS | TEMPERATURE: 98 F | OXYGEN SATURATION: 92 % | SYSTOLIC BLOOD PRESSURE: 138 MMHG | HEIGHT: 66 IN | HEART RATE: 74 BPM

## 2021-05-25 DIAGNOSIS — K21.9 GERD WITHOUT ESOPHAGITIS: ICD-10-CM

## 2021-05-25 DIAGNOSIS — K31.9 GASTRIC LESION: Primary | ICD-10-CM

## 2021-05-25 PROCEDURE — 43999 PR ENDOSCOPIC SUBMUCOSAL DISSECTION - EGD: ICD-10-PCS | Mod: ,,, | Performed by: INTERNAL MEDICINE

## 2021-05-25 PROCEDURE — D9220A PRA ANESTHESIA: Mod: ANES,,, | Performed by: ANESTHESIOLOGY

## 2021-05-25 PROCEDURE — D9220A PRA ANESTHESIA: ICD-10-PCS | Mod: ANES,,, | Performed by: ANESTHESIOLOGY

## 2021-05-25 PROCEDURE — 27202363 HC INJECTION AGENT, SUBMUCOSAL, ANY: Performed by: INTERNAL MEDICINE

## 2021-05-25 PROCEDURE — 27202299 HC NEEDLE KNIFE, TISSUE RESECTION: Performed by: INTERNAL MEDICINE

## 2021-05-25 PROCEDURE — 88305 TISSUE EXAM BY PATHOLOGIST: ICD-10-PCS | Mod: 26,,, | Performed by: PATHOLOGY

## 2021-05-25 PROCEDURE — 27200967 HC COAGRASPER: Performed by: INTERNAL MEDICINE

## 2021-05-25 PROCEDURE — D9220A PRA ANESTHESIA: Mod: CRNA,,, | Performed by: NURSE ANESTHETIST, CERTIFIED REGISTERED

## 2021-05-25 PROCEDURE — 63600175 PHARM REV CODE 636 W HCPCS: Performed by: NURSE ANESTHETIST, CERTIFIED REGISTERED

## 2021-05-25 PROCEDURE — 37000009 HC ANESTHESIA EA ADD 15 MINS: Performed by: INTERNAL MEDICINE

## 2021-05-25 PROCEDURE — 63600175 PHARM REV CODE 636 W HCPCS: Performed by: INTERNAL MEDICINE

## 2021-05-25 PROCEDURE — D9220A PRA ANESTHESIA: ICD-10-PCS | Mod: CRNA,,, | Performed by: NURSE ANESTHETIST, CERTIFIED REGISTERED

## 2021-05-25 PROCEDURE — 37000008 HC ANESTHESIA 1ST 15 MINUTES: Performed by: INTERNAL MEDICINE

## 2021-05-25 PROCEDURE — 25000003 PHARM REV CODE 250: Performed by: NURSE ANESTHETIST, CERTIFIED REGISTERED

## 2021-05-25 PROCEDURE — 43247 EGD REMOVE FOREIGN BODY: CPT | Performed by: INTERNAL MEDICINE

## 2021-05-25 PROCEDURE — 27201042 HC RETRIEVAL NET: Performed by: INTERNAL MEDICINE

## 2021-05-25 PROCEDURE — 43999 UNLISTED PROCEDURE STOMACH: CPT | Performed by: INTERNAL MEDICINE

## 2021-05-25 PROCEDURE — 27201243: Performed by: INTERNAL MEDICINE

## 2021-05-25 PROCEDURE — 43999 UNLISTED PROCEDURE STOMACH: CPT | Mod: ,,, | Performed by: INTERNAL MEDICINE

## 2021-05-25 PROCEDURE — 43247 PR EGD, FLEX, W/REMOVAL, FOREIGN BODY: ICD-10-PCS | Mod: 51,,, | Performed by: INTERNAL MEDICINE

## 2021-05-25 PROCEDURE — 25000003 PHARM REV CODE 250: Performed by: INTERNAL MEDICINE

## 2021-05-25 PROCEDURE — 88305 TISSUE EXAM BY PATHOLOGIST: CPT | Mod: 26,,, | Performed by: PATHOLOGY

## 2021-05-25 PROCEDURE — 43247 EGD REMOVE FOREIGN BODY: CPT | Mod: 51,,, | Performed by: INTERNAL MEDICINE

## 2021-05-25 PROCEDURE — 27201028 HC NEEDLE, SCLERO: Performed by: INTERNAL MEDICINE

## 2021-05-25 PROCEDURE — 88305 TISSUE EXAM BY PATHOLOGIST: CPT | Performed by: PATHOLOGY

## 2021-05-25 RX ORDER — SUCRALFATE 1 G/1
TABLET ORAL
Qty: 60 TABLET | Refills: 0 | Status: SHIPPED | OUTPATIENT
Start: 2021-05-25 | End: 2022-01-25

## 2021-05-25 RX ORDER — ONDANSETRON 2 MG/ML
INJECTION INTRAMUSCULAR; INTRAVENOUS
Status: DISCONTINUED | OUTPATIENT
Start: 2021-05-25 | End: 2021-05-25

## 2021-05-25 RX ORDER — METHYLENE BLUE 5 MG/ML
INJECTION INTRAVENOUS
Status: COMPLETED | OUTPATIENT
Start: 2021-05-25 | End: 2021-05-25

## 2021-05-25 RX ORDER — SODIUM CHLORIDE 0.9 % (FLUSH) 0.9 %
10 SYRINGE (ML) INJECTION
Status: DISCONTINUED | OUTPATIENT
Start: 2021-05-25 | End: 2021-05-25 | Stop reason: HOSPADM

## 2021-05-25 RX ORDER — ROCURONIUM BROMIDE 10 MG/ML
INJECTION, SOLUTION INTRAVENOUS
Status: DISCONTINUED | OUTPATIENT
Start: 2021-05-25 | End: 2021-05-25

## 2021-05-25 RX ORDER — PANTOPRAZOLE SODIUM 40 MG/1
40 TABLET, DELAYED RELEASE ORAL 2 TIMES DAILY
Qty: 60 TABLET | Refills: 2 | Status: ON HOLD | OUTPATIENT
Start: 2021-05-25 | End: 2021-10-20

## 2021-05-25 RX ORDER — EPINEPHRINE 1 MG/ML
INJECTION, SOLUTION INTRACARDIAC; INTRAMUSCULAR; INTRAVENOUS; SUBCUTANEOUS
Status: COMPLETED | OUTPATIENT
Start: 2021-05-25 | End: 2021-05-25

## 2021-05-25 RX ORDER — PROPOFOL 10 MG/ML
VIAL (ML) INTRAVENOUS
Status: DISCONTINUED | OUTPATIENT
Start: 2021-05-25 | End: 2021-05-25

## 2021-05-25 RX ORDER — SODIUM CHLORIDE 9 MG/ML
INJECTION, SOLUTION INTRAVENOUS CONTINUOUS
Status: DISCONTINUED | OUTPATIENT
Start: 2021-05-25 | End: 2021-05-25 | Stop reason: HOSPADM

## 2021-05-25 RX ORDER — SODIUM CHLORIDE 0.9 G/100ML
IRRIGANT IRRIGATION
Status: COMPLETED | OUTPATIENT
Start: 2021-05-25 | End: 2021-05-25

## 2021-05-25 RX ORDER — LIDOCAINE HYDROCHLORIDE 20 MG/ML
INJECTION INTRAVENOUS
Status: DISCONTINUED | OUTPATIENT
Start: 2021-05-25 | End: 2021-05-25

## 2021-05-25 RX ORDER — FENTANYL CITRATE 50 UG/ML
INJECTION, SOLUTION INTRAMUSCULAR; INTRAVENOUS
Status: DISCONTINUED | OUTPATIENT
Start: 2021-05-25 | End: 2021-05-25

## 2021-05-25 RX ADMIN — PROPOFOL 150 MG: 10 INJECTION, EMULSION INTRAVENOUS at 09:05

## 2021-05-25 RX ADMIN — ROCURONIUM BROMIDE 20 MG: 10 INJECTION, SOLUTION INTRAVENOUS at 10:05

## 2021-05-25 RX ADMIN — FENTANYL CITRATE 25 MCG: 50 INJECTION, SOLUTION INTRAMUSCULAR; INTRAVENOUS at 11:05

## 2021-05-25 RX ADMIN — SODIUM CHLORIDE 500 ML: 0.9 IRRIGANT IRRIGATION at 09:05

## 2021-05-25 RX ADMIN — EPINEPHRINE 1.5 MG: 1 INJECTION, SOLUTION INTRAMUSCULAR; SUBCUTANEOUS at 09:05

## 2021-05-25 RX ADMIN — ROCURONIUM BROMIDE 50 MG: 10 INJECTION, SOLUTION INTRAVENOUS at 09:05

## 2021-05-25 RX ADMIN — FENTANYL CITRATE 75 MCG: 50 INJECTION, SOLUTION INTRAMUSCULAR; INTRAVENOUS at 09:05

## 2021-05-25 RX ADMIN — METHYLENE BLUE 5 MG: 5 INJECTION INTRAVENOUS at 09:05

## 2021-05-25 RX ADMIN — SUGAMMADEX 200 MG: 100 INJECTION, SOLUTION INTRAVENOUS at 11:05

## 2021-05-25 RX ADMIN — SODIUM CHLORIDE: 0.9 INJECTION, SOLUTION INTRAVENOUS at 09:05

## 2021-05-25 RX ADMIN — LIDOCAINE HYDROCHLORIDE 100 MG: 20 INJECTION, SOLUTION INTRAVENOUS at 09:05

## 2021-05-25 RX ADMIN — ONDANSETRON 4 MG: 2 INJECTION, SOLUTION INTRAMUSCULAR; INTRAVENOUS at 11:05

## 2021-05-27 ENCOUNTER — TELEPHONE (OUTPATIENT)
Dept: ENDOSCOPY | Facility: HOSPITAL | Age: 71
End: 2021-05-27

## 2021-05-27 DIAGNOSIS — K31.7 GASTRIC POLYP: Primary | ICD-10-CM

## 2021-05-31 ENCOUNTER — TELEPHONE (OUTPATIENT)
Dept: PAIN MEDICINE | Facility: CLINIC | Age: 71
End: 2021-05-31

## 2021-05-31 LAB
FINAL PATHOLOGIC DIAGNOSIS: NORMAL
GROSS: NORMAL
Lab: NORMAL

## 2021-06-01 ENCOUNTER — TELEPHONE (OUTPATIENT)
Dept: ENDOSCOPY | Facility: HOSPITAL | Age: 71
End: 2021-06-01

## 2021-06-01 ENCOUNTER — OFFICE VISIT (OUTPATIENT)
Dept: PAIN MEDICINE | Facility: CLINIC | Age: 71
End: 2021-06-01
Payer: MEDICARE

## 2021-06-01 VITALS
TEMPERATURE: 99 F | DIASTOLIC BLOOD PRESSURE: 70 MMHG | HEART RATE: 80 BPM | HEIGHT: 66 IN | BODY MASS INDEX: 47.09 KG/M2 | WEIGHT: 293 LBS | RESPIRATION RATE: 18 BRPM | SYSTOLIC BLOOD PRESSURE: 125 MMHG

## 2021-06-01 DIAGNOSIS — G89.29 CHRONIC PAIN OF LEFT KNEE: Primary | ICD-10-CM

## 2021-06-01 DIAGNOSIS — C16.9 MALIGNANT NEOPLASM OF STOMACH, UNSPECIFIED LOCATION: Primary | ICD-10-CM

## 2021-06-01 DIAGNOSIS — Z51.81 ENCOUNTER FOR MONITORING OPIOID MAINTENANCE THERAPY: ICD-10-CM

## 2021-06-01 DIAGNOSIS — Z79.891 ENCOUNTER FOR MONITORING OPIOID MAINTENANCE THERAPY: ICD-10-CM

## 2021-06-01 DIAGNOSIS — G89.4 CHRONIC PAIN DISORDER: ICD-10-CM

## 2021-06-01 DIAGNOSIS — M25.562 CHRONIC PAIN OF LEFT KNEE: Primary | ICD-10-CM

## 2021-06-01 DIAGNOSIS — M17.12 PRIMARY OSTEOARTHRITIS OF LEFT KNEE: ICD-10-CM

## 2021-06-01 PROCEDURE — 3288F PR FALLS RISK ASSESSMENT DOCUMENTED: ICD-10-PCS | Mod: CPTII,S$GLB,, | Performed by: NURSE PRACTITIONER

## 2021-06-01 PROCEDURE — 1125F AMNT PAIN NOTED PAIN PRSNT: CPT | Mod: S$GLB,,, | Performed by: NURSE PRACTITIONER

## 2021-06-01 PROCEDURE — 99213 OFFICE O/P EST LOW 20 MIN: CPT | Mod: S$GLB,,, | Performed by: NURSE PRACTITIONER

## 2021-06-01 PROCEDURE — 1101F PR PT FALLS ASSESS DOC 0-1 FALLS W/OUT INJ PAST YR: ICD-10-PCS | Mod: CPTII,S$GLB,, | Performed by: NURSE PRACTITIONER

## 2021-06-01 PROCEDURE — 3008F PR BODY MASS INDEX (BMI) DOCUMENTED: ICD-10-PCS | Mod: CPTII,S$GLB,, | Performed by: NURSE PRACTITIONER

## 2021-06-01 PROCEDURE — 3288F FALL RISK ASSESSMENT DOCD: CPT | Mod: CPTII,S$GLB,, | Performed by: NURSE PRACTITIONER

## 2021-06-01 PROCEDURE — 1159F PR MEDICATION LIST DOCUMENTED IN MEDICAL RECORD: ICD-10-PCS | Mod: S$GLB,,, | Performed by: NURSE PRACTITIONER

## 2021-06-01 PROCEDURE — 3008F BODY MASS INDEX DOCD: CPT | Mod: CPTII,S$GLB,, | Performed by: NURSE PRACTITIONER

## 2021-06-01 PROCEDURE — 99213 PR OFFICE/OUTPT VISIT, EST, LEVL III, 20-29 MIN: ICD-10-PCS | Mod: S$GLB,,, | Performed by: NURSE PRACTITIONER

## 2021-06-01 PROCEDURE — 99999 PR PBB SHADOW E&M-EST. PATIENT-LVL IV: CPT | Mod: PBBFAC,,, | Performed by: NURSE PRACTITIONER

## 2021-06-01 PROCEDURE — 1125F PR PAIN SEVERITY QUANTIFIED, PAIN PRESENT: ICD-10-PCS | Mod: S$GLB,,, | Performed by: NURSE PRACTITIONER

## 2021-06-01 PROCEDURE — 99999 PR PBB SHADOW E&M-EST. PATIENT-LVL IV: ICD-10-PCS | Mod: PBBFAC,,, | Performed by: NURSE PRACTITIONER

## 2021-06-01 PROCEDURE — 1101F PT FALLS ASSESS-DOCD LE1/YR: CPT | Mod: CPTII,S$GLB,, | Performed by: NURSE PRACTITIONER

## 2021-06-01 PROCEDURE — 1159F MED LIST DOCD IN RCRD: CPT | Mod: S$GLB,,, | Performed by: NURSE PRACTITIONER

## 2021-06-01 RX ORDER — GABAPENTIN 300 MG/1
600 CAPSULE ORAL 3 TIMES DAILY
Qty: 540 CAPSULE | Refills: 2 | Status: SHIPPED | OUTPATIENT
Start: 2021-06-01 | End: 2021-11-09 | Stop reason: SDUPTHER

## 2021-06-03 DIAGNOSIS — C16.9 GASTRIC ADENOCARCINOMA: Primary | ICD-10-CM

## 2021-06-04 ENCOUNTER — TELEPHONE (OUTPATIENT)
Dept: SURGERY | Facility: CLINIC | Age: 71
End: 2021-06-04

## 2021-06-04 ENCOUNTER — TELEPHONE (OUTPATIENT)
Dept: PAIN MEDICINE | Facility: CLINIC | Age: 71
End: 2021-06-04

## 2021-06-04 RX ORDER — SEMAGLUTIDE 1.34 MG/ML
INJECTION, SOLUTION SUBCUTANEOUS
Status: CANCELLED | OUTPATIENT
Start: 2021-06-04

## 2021-06-07 ENCOUNTER — PROCEDURE VISIT (OUTPATIENT)
Dept: PAIN MEDICINE | Facility: CLINIC | Age: 71
End: 2021-06-07
Payer: MEDICARE

## 2021-06-07 VITALS
SYSTOLIC BLOOD PRESSURE: 124 MMHG | HEART RATE: 78 BPM | TEMPERATURE: 98 F | BODY MASS INDEX: 57.15 KG/M2 | DIASTOLIC BLOOD PRESSURE: 73 MMHG | HEIGHT: 66 IN

## 2021-06-07 DIAGNOSIS — G89.29 OTHER CHRONIC PAIN: Primary | ICD-10-CM

## 2021-06-07 DIAGNOSIS — M17.12 PRIMARY OSTEOARTHRITIS OF LEFT KNEE: ICD-10-CM

## 2021-06-07 PROCEDURE — 20611 DRAIN/INJ JOINT/BURSA W/US: CPT | Mod: LT,S$GLB,, | Performed by: ANESTHESIOLOGY

## 2021-06-07 PROCEDURE — 20611 PR DRAIN/ASP/INJECT MAJOR JOINT/BURSA W/US GUIDANCE: ICD-10-PCS | Mod: LT,S$GLB,, | Performed by: ANESTHESIOLOGY

## 2021-06-10 ENCOUNTER — TELEPHONE (OUTPATIENT)
Dept: BARIATRICS | Facility: CLINIC | Age: 71
End: 2021-06-10

## 2021-06-10 RX ORDER — SEMAGLUTIDE 1.34 MG/ML
1 INJECTION, SOLUTION SUBCUTANEOUS
Qty: 2 PEN | Refills: 3 | Status: SHIPPED | OUTPATIENT
Start: 2021-06-10 | End: 2022-02-01 | Stop reason: SDUPTHER

## 2021-06-14 ENCOUNTER — TELEPHONE (OUTPATIENT)
Dept: BARIATRICS | Facility: CLINIC | Age: 71
End: 2021-06-14

## 2021-06-18 ENCOUNTER — HOSPITAL ENCOUNTER (OUTPATIENT)
Dept: RADIOLOGY | Facility: OTHER | Age: 71
Discharge: HOME | End: 2021-06-18
Attending: NURSE PRACTITIONER
Payer: MEDICARE

## 2021-06-18 DIAGNOSIS — C16.9 GASTRIC ADENOCARCINOMA: ICD-10-CM

## 2021-06-18 PROCEDURE — 74177 CT ABD & PELVIS W/CONTRAST: CPT | Mod: 26,,, | Performed by: RADIOLOGY

## 2021-06-18 PROCEDURE — 25500020 PHARM REV CODE 255: Performed by: NURSE PRACTITIONER

## 2021-06-18 PROCEDURE — 74177 CT CHEST ABDOMEN PELVIS WITH CONTRAST (XPD): ICD-10-PCS | Mod: 26,,, | Performed by: RADIOLOGY

## 2021-06-18 PROCEDURE — 71260 CT CHEST ABDOMEN PELVIS WITH CONTRAST (XPD): ICD-10-PCS | Mod: 26,,, | Performed by: RADIOLOGY

## 2021-06-18 PROCEDURE — 71260 CT THORAX DX C+: CPT | Mod: TC

## 2021-06-18 PROCEDURE — 71260 CT THORAX DX C+: CPT | Mod: 26,,, | Performed by: RADIOLOGY

## 2021-06-18 PROCEDURE — 74177 CT ABD & PELVIS W/CONTRAST: CPT | Mod: TC

## 2021-06-18 PROCEDURE — A9698 NON-RAD CONTRAST MATERIALNOC: HCPCS | Performed by: NURSE PRACTITIONER

## 2021-06-18 RX ADMIN — IOHEXOL 1000 ML: 12 SOLUTION ORAL at 09:06

## 2021-06-18 RX ADMIN — IOHEXOL 100 ML: 350 INJECTION, SOLUTION INTRAVENOUS at 10:06

## 2021-06-22 ENCOUNTER — OFFICE VISIT (OUTPATIENT)
Dept: HEMATOLOGY/ONCOLOGY | Facility: CLINIC | Age: 71
End: 2021-06-22
Attending: INTERNAL MEDICINE
Payer: MEDICARE

## 2021-06-22 VITALS
DIASTOLIC BLOOD PRESSURE: 65 MMHG | HEART RATE: 74 BPM | OXYGEN SATURATION: 97 % | RESPIRATION RATE: 18 BRPM | SYSTOLIC BLOOD PRESSURE: 136 MMHG | BODY MASS INDEX: 47.09 KG/M2 | WEIGHT: 293 LBS | HEIGHT: 66 IN | TEMPERATURE: 98 F

## 2021-06-22 DIAGNOSIS — I10 ESSENTIAL HYPERTENSION: ICD-10-CM

## 2021-06-22 DIAGNOSIS — E66.01 CLASS 3 SEVERE OBESITY DUE TO EXCESS CALORIES WITH SERIOUS COMORBIDITY AND BODY MASS INDEX (BMI) OF 50.0 TO 59.9 IN ADULT: ICD-10-CM

## 2021-06-22 DIAGNOSIS — I50.32 CHRONIC DIASTOLIC CONGESTIVE HEART FAILURE: ICD-10-CM

## 2021-06-22 DIAGNOSIS — C16.9 MALIGNANT NEOPLASM OF STOMACH, UNSPECIFIED LOCATION: Primary | ICD-10-CM

## 2021-06-22 PROCEDURE — 99999 PR PBB SHADOW E&M-EST. PATIENT-LVL IV: CPT | Mod: PBBFAC,,, | Performed by: STUDENT IN AN ORGANIZED HEALTH CARE EDUCATION/TRAINING PROGRAM

## 2021-06-22 PROCEDURE — 99205 PR OFFICE/OUTPT VISIT, NEW, LEVL V, 60-74 MIN: ICD-10-PCS | Mod: S$GLB,,, | Performed by: STUDENT IN AN ORGANIZED HEALTH CARE EDUCATION/TRAINING PROGRAM

## 2021-06-22 PROCEDURE — 1125F PR PAIN SEVERITY QUANTIFIED, PAIN PRESENT: ICD-10-PCS | Mod: S$GLB,,, | Performed by: STUDENT IN AN ORGANIZED HEALTH CARE EDUCATION/TRAINING PROGRAM

## 2021-06-22 PROCEDURE — 99999 PR PBB SHADOW E&M-EST. PATIENT-LVL IV: ICD-10-PCS | Mod: PBBFAC,,, | Performed by: STUDENT IN AN ORGANIZED HEALTH CARE EDUCATION/TRAINING PROGRAM

## 2021-06-22 PROCEDURE — 1125F AMNT PAIN NOTED PAIN PRSNT: CPT | Mod: S$GLB,,, | Performed by: STUDENT IN AN ORGANIZED HEALTH CARE EDUCATION/TRAINING PROGRAM

## 2021-06-22 PROCEDURE — 1159F MED LIST DOCD IN RCRD: CPT | Mod: S$GLB,,, | Performed by: STUDENT IN AN ORGANIZED HEALTH CARE EDUCATION/TRAINING PROGRAM

## 2021-06-22 PROCEDURE — 1101F PR PT FALLS ASSESS DOC 0-1 FALLS W/OUT INJ PAST YR: ICD-10-PCS | Mod: CPTII,S$GLB,, | Performed by: STUDENT IN AN ORGANIZED HEALTH CARE EDUCATION/TRAINING PROGRAM

## 2021-06-22 PROCEDURE — 3008F BODY MASS INDEX DOCD: CPT | Mod: CPTII,S$GLB,, | Performed by: STUDENT IN AN ORGANIZED HEALTH CARE EDUCATION/TRAINING PROGRAM

## 2021-06-22 PROCEDURE — 99205 OFFICE O/P NEW HI 60 MIN: CPT | Mod: S$GLB,,, | Performed by: STUDENT IN AN ORGANIZED HEALTH CARE EDUCATION/TRAINING PROGRAM

## 2021-06-22 PROCEDURE — 1159F PR MEDICATION LIST DOCUMENTED IN MEDICAL RECORD: ICD-10-PCS | Mod: S$GLB,,, | Performed by: STUDENT IN AN ORGANIZED HEALTH CARE EDUCATION/TRAINING PROGRAM

## 2021-06-22 PROCEDURE — 3288F PR FALLS RISK ASSESSMENT DOCUMENTED: ICD-10-PCS | Mod: CPTII,S$GLB,, | Performed by: STUDENT IN AN ORGANIZED HEALTH CARE EDUCATION/TRAINING PROGRAM

## 2021-06-22 PROCEDURE — 1101F PT FALLS ASSESS-DOCD LE1/YR: CPT | Mod: CPTII,S$GLB,, | Performed by: STUDENT IN AN ORGANIZED HEALTH CARE EDUCATION/TRAINING PROGRAM

## 2021-06-22 PROCEDURE — 3008F PR BODY MASS INDEX (BMI) DOCUMENTED: ICD-10-PCS | Mod: CPTII,S$GLB,, | Performed by: STUDENT IN AN ORGANIZED HEALTH CARE EDUCATION/TRAINING PROGRAM

## 2021-06-22 PROCEDURE — 3288F FALL RISK ASSESSMENT DOCD: CPT | Mod: CPTII,S$GLB,, | Performed by: STUDENT IN AN ORGANIZED HEALTH CARE EDUCATION/TRAINING PROGRAM

## 2021-06-24 ENCOUNTER — OFFICE VISIT (OUTPATIENT)
Dept: SURGERY | Facility: CLINIC | Age: 71
End: 2021-06-24
Payer: MEDICARE

## 2021-06-24 VITALS
RESPIRATION RATE: 18 BRPM | OXYGEN SATURATION: 94 % | HEIGHT: 66 IN | BODY MASS INDEX: 47.09 KG/M2 | SYSTOLIC BLOOD PRESSURE: 127 MMHG | DIASTOLIC BLOOD PRESSURE: 58 MMHG | TEMPERATURE: 98 F | HEART RATE: 83 BPM | WEIGHT: 293 LBS

## 2021-06-24 DIAGNOSIS — C16.9 GASTRIC ADENOCARCINOMA: ICD-10-CM

## 2021-06-24 DIAGNOSIS — M25.562 CHRONIC PAIN OF LEFT KNEE: ICD-10-CM

## 2021-06-24 DIAGNOSIS — G89.29 CHRONIC PAIN OF LEFT KNEE: ICD-10-CM

## 2021-06-24 DIAGNOSIS — E50.9 VITAMIN A DEFICIENCY: ICD-10-CM

## 2021-06-24 DIAGNOSIS — C16.9 MALIGNANT NEOPLASM OF STOMACH, UNSPECIFIED LOCATION: Primary | ICD-10-CM

## 2021-06-24 DIAGNOSIS — E55.9 VITAMIN D DEFICIENCY: ICD-10-CM

## 2021-06-24 DIAGNOSIS — Z72.0 TOBACCO ABUSE: Chronic | ICD-10-CM

## 2021-06-24 DIAGNOSIS — G89.4 CHRONIC PAIN DISORDER: Primary | ICD-10-CM

## 2021-06-24 DIAGNOSIS — Z91.81 AT HIGH RISK FOR INJURY RELATED TO FALL: ICD-10-CM

## 2021-06-24 DIAGNOSIS — E66.01 CLASS 3 SEVERE OBESITY DUE TO EXCESS CALORIES WITH SERIOUS COMORBIDITY AND BODY MASS INDEX (BMI) OF 50.0 TO 59.9 IN ADULT: ICD-10-CM

## 2021-06-24 DIAGNOSIS — G89.29 OTHER CHRONIC PAIN: ICD-10-CM

## 2021-06-24 DIAGNOSIS — R29.898 SEVERE MUSCLE DECONDITIONING: Primary | ICD-10-CM

## 2021-06-24 DIAGNOSIS — R26.81 GAIT INSTABILITY: ICD-10-CM

## 2021-06-24 DIAGNOSIS — R73.09 HEMOGLOBIN A1C ABOVE REFERENCE RANGE: ICD-10-CM

## 2021-06-24 PROCEDURE — 1101F PT FALLS ASSESS-DOCD LE1/YR: CPT | Mod: CPTII,S$GLB,, | Performed by: NURSE PRACTITIONER

## 2021-06-24 PROCEDURE — 3008F BODY MASS INDEX DOCD: CPT | Mod: CPTII,S$GLB,, | Performed by: NURSE PRACTITIONER

## 2021-06-24 PROCEDURE — 3008F PR BODY MASS INDEX (BMI) DOCUMENTED: ICD-10-PCS | Mod: CPTII,S$GLB,, | Performed by: NURSE PRACTITIONER

## 2021-06-24 PROCEDURE — 99999 PR PBB SHADOW E&M-EST. PATIENT-LVL V: CPT | Mod: PBBFAC,,, | Performed by: NURSE PRACTITIONER

## 2021-06-24 PROCEDURE — 99999 PR PBB SHADOW E&M-EST. PATIENT-LVL V: ICD-10-PCS | Mod: PBBFAC,,, | Performed by: NURSE PRACTITIONER

## 2021-06-24 PROCEDURE — 99215 PR OFFICE/OUTPT VISIT, EST, LEVL V, 40-54 MIN: ICD-10-PCS | Mod: S$GLB,,, | Performed by: NURSE PRACTITIONER

## 2021-06-24 PROCEDURE — 1101F PR PT FALLS ASSESS DOC 0-1 FALLS W/OUT INJ PAST YR: ICD-10-PCS | Mod: CPTII,S$GLB,, | Performed by: NURSE PRACTITIONER

## 2021-06-24 PROCEDURE — 99215 OFFICE O/P EST HI 40 MIN: CPT | Mod: S$GLB,,, | Performed by: NURSE PRACTITIONER

## 2021-06-24 PROCEDURE — 3288F FALL RISK ASSESSMENT DOCD: CPT | Mod: CPTII,S$GLB,, | Performed by: NURSE PRACTITIONER

## 2021-06-24 PROCEDURE — 1126F AMNT PAIN NOTED NONE PRSNT: CPT | Mod: S$GLB,,, | Performed by: NURSE PRACTITIONER

## 2021-06-24 PROCEDURE — 3288F PR FALLS RISK ASSESSMENT DOCUMENTED: ICD-10-PCS | Mod: CPTII,S$GLB,, | Performed by: NURSE PRACTITIONER

## 2021-06-24 PROCEDURE — 1126F PR PAIN SEVERITY QUANTIFIED, NO PAIN PRESENT: ICD-10-PCS | Mod: S$GLB,,, | Performed by: NURSE PRACTITIONER

## 2021-06-24 RX ORDER — DOBUTAMINE HYDROCHLORIDE 400 MG/100ML
INJECTION INTRAVENOUS
COMMUNITY
Start: 2021-01-26 | End: 2021-08-04 | Stop reason: ALTCHOICE

## 2021-06-24 RX ORDER — OXYCODONE AND ACETAMINOPHEN 5; 325 MG/1; MG/1
1 TABLET ORAL EVERY 8 HOURS PRN
Qty: 90 TABLET | Refills: 0 | Status: SHIPPED | OUTPATIENT
Start: 2021-06-24 | End: 2021-07-23 | Stop reason: SDUPTHER

## 2021-06-24 RX ORDER — IOHEXOL 350 MG/ML
INJECTION, SOLUTION INTRAVENOUS
COMMUNITY
Start: 2021-02-24 | End: 2021-08-04 | Stop reason: ALTCHOICE

## 2021-06-25 ENCOUNTER — TELEPHONE (OUTPATIENT)
Dept: SURGERY | Facility: CLINIC | Age: 71
End: 2021-06-25

## 2021-06-26 PROBLEM — C16.9 GASTRIC ADENOCARCINOMA: Status: ACTIVE | Noted: 2021-05-25

## 2021-06-28 ENCOUNTER — TUMOR BOARD CONFERENCE (OUTPATIENT)
Dept: SURGERY | Facility: CLINIC | Age: 71
End: 2021-06-28

## 2021-06-29 ENCOUNTER — TELEPHONE (OUTPATIENT)
Dept: INTERVENTIONAL RADIOLOGY/VASCULAR | Facility: OTHER | Age: 71
End: 2021-06-29

## 2021-06-29 ENCOUNTER — OFFICE VISIT (OUTPATIENT)
Dept: HEMATOLOGY/ONCOLOGY | Facility: CLINIC | Age: 71
End: 2021-06-29
Payer: MEDICARE

## 2021-06-29 VITALS
BODY MASS INDEX: 47.09 KG/M2 | HEART RATE: 74 BPM | TEMPERATURE: 99 F | WEIGHT: 293 LBS | OXYGEN SATURATION: 96 % | HEIGHT: 66 IN | SYSTOLIC BLOOD PRESSURE: 127 MMHG | RESPIRATION RATE: 18 BRPM | DIASTOLIC BLOOD PRESSURE: 66 MMHG

## 2021-06-29 DIAGNOSIS — I50.32 CHRONIC DIASTOLIC CONGESTIVE HEART FAILURE: ICD-10-CM

## 2021-06-29 DIAGNOSIS — I10 ESSENTIAL HYPERTENSION: ICD-10-CM

## 2021-06-29 DIAGNOSIS — Z79.899 ENCOUNTER FOR MONITORING CARDIOTOXIC DRUG THERAPY: ICD-10-CM

## 2021-06-29 DIAGNOSIS — Z92.21 STATUS POST ADMINISTRATION OF CARDIOTOXIC CHEMOTHERAPY: ICD-10-CM

## 2021-06-29 DIAGNOSIS — D84.81 IMMUNODEFICIENCY DUE TO CONDITIONS CLASSIFIED ELSEWHERE: ICD-10-CM

## 2021-06-29 DIAGNOSIS — Z51.81 ENCOUNTER FOR MONITORING CARDIOTOXIC DRUG THERAPY: ICD-10-CM

## 2021-06-29 DIAGNOSIS — C16.9 MALIGNANT NEOPLASM OF STOMACH, UNSPECIFIED LOCATION: ICD-10-CM

## 2021-06-29 DIAGNOSIS — C16.9 GASTRIC ADENOCARCINOMA: Primary | ICD-10-CM

## 2021-06-29 DIAGNOSIS — E66.01 CLASS 3 SEVERE OBESITY DUE TO EXCESS CALORIES WITH SERIOUS COMORBIDITY AND BODY MASS INDEX (BMI) OF 50.0 TO 59.9 IN ADULT: ICD-10-CM

## 2021-06-29 PROCEDURE — 3288F FALL RISK ASSESSMENT DOCD: CPT | Mod: CPTII,S$GLB,, | Performed by: STUDENT IN AN ORGANIZED HEALTH CARE EDUCATION/TRAINING PROGRAM

## 2021-06-29 PROCEDURE — 99215 PR OFFICE/OUTPT VISIT, EST, LEVL V, 40-54 MIN: ICD-10-PCS | Mod: S$GLB,,, | Performed by: STUDENT IN AN ORGANIZED HEALTH CARE EDUCATION/TRAINING PROGRAM

## 2021-06-29 PROCEDURE — 99499 RISK ADDL DX/OHS AUDIT: ICD-10-PCS | Mod: S$GLB,,, | Performed by: STUDENT IN AN ORGANIZED HEALTH CARE EDUCATION/TRAINING PROGRAM

## 2021-06-29 PROCEDURE — 3008F BODY MASS INDEX DOCD: CPT | Mod: CPTII,S$GLB,, | Performed by: STUDENT IN AN ORGANIZED HEALTH CARE EDUCATION/TRAINING PROGRAM

## 2021-06-29 PROCEDURE — 99999 PR PBB SHADOW E&M-EST. PATIENT-LVL IV: ICD-10-PCS | Mod: PBBFAC,,, | Performed by: STUDENT IN AN ORGANIZED HEALTH CARE EDUCATION/TRAINING PROGRAM

## 2021-06-29 PROCEDURE — 99215 OFFICE O/P EST HI 40 MIN: CPT | Mod: S$GLB,,, | Performed by: STUDENT IN AN ORGANIZED HEALTH CARE EDUCATION/TRAINING PROGRAM

## 2021-06-29 PROCEDURE — 1159F PR MEDICATION LIST DOCUMENTED IN MEDICAL RECORD: ICD-10-PCS | Mod: S$GLB,,, | Performed by: STUDENT IN AN ORGANIZED HEALTH CARE EDUCATION/TRAINING PROGRAM

## 2021-06-29 PROCEDURE — 1125F AMNT PAIN NOTED PAIN PRSNT: CPT | Mod: S$GLB,,, | Performed by: STUDENT IN AN ORGANIZED HEALTH CARE EDUCATION/TRAINING PROGRAM

## 2021-06-29 PROCEDURE — 3008F PR BODY MASS INDEX (BMI) DOCUMENTED: ICD-10-PCS | Mod: CPTII,S$GLB,, | Performed by: STUDENT IN AN ORGANIZED HEALTH CARE EDUCATION/TRAINING PROGRAM

## 2021-06-29 PROCEDURE — 3288F PR FALLS RISK ASSESSMENT DOCUMENTED: ICD-10-PCS | Mod: CPTII,S$GLB,, | Performed by: STUDENT IN AN ORGANIZED HEALTH CARE EDUCATION/TRAINING PROGRAM

## 2021-06-29 PROCEDURE — 1101F PR PT FALLS ASSESS DOC 0-1 FALLS W/OUT INJ PAST YR: ICD-10-PCS | Mod: CPTII,S$GLB,, | Performed by: STUDENT IN AN ORGANIZED HEALTH CARE EDUCATION/TRAINING PROGRAM

## 2021-06-29 PROCEDURE — 1159F MED LIST DOCD IN RCRD: CPT | Mod: S$GLB,,, | Performed by: STUDENT IN AN ORGANIZED HEALTH CARE EDUCATION/TRAINING PROGRAM

## 2021-06-29 PROCEDURE — 1101F PT FALLS ASSESS-DOCD LE1/YR: CPT | Mod: CPTII,S$GLB,, | Performed by: STUDENT IN AN ORGANIZED HEALTH CARE EDUCATION/TRAINING PROGRAM

## 2021-06-29 PROCEDURE — 99499 UNLISTED E&M SERVICE: CPT | Mod: S$GLB,,, | Performed by: STUDENT IN AN ORGANIZED HEALTH CARE EDUCATION/TRAINING PROGRAM

## 2021-06-29 PROCEDURE — 99999 PR PBB SHADOW E&M-EST. PATIENT-LVL IV: CPT | Mod: PBBFAC,,, | Performed by: STUDENT IN AN ORGANIZED HEALTH CARE EDUCATION/TRAINING PROGRAM

## 2021-06-29 PROCEDURE — 1125F PR PAIN SEVERITY QUANTIFIED, PAIN PRESENT: ICD-10-PCS | Mod: S$GLB,,, | Performed by: STUDENT IN AN ORGANIZED HEALTH CARE EDUCATION/TRAINING PROGRAM

## 2021-06-29 RX ORDER — PROMETHAZINE HYDROCHLORIDE 25 MG/1
25 TABLET ORAL EVERY 6 HOURS PRN
Qty: 60 TABLET | Refills: 3 | Status: SHIPPED | OUTPATIENT
Start: 2021-06-29 | End: 2021-10-14

## 2021-06-29 RX ORDER — DEXAMETHASONE 4 MG/1
4 TABLET ORAL EVERY 12 HOURS
Qty: 6 TABLET | Refills: 5 | Status: SHIPPED | OUTPATIENT
Start: 2021-06-29 | End: 2021-07-02

## 2021-06-29 RX ORDER — POLYETHYLENE GLYCOL 3350 17 G/17G
17 POWDER, FOR SOLUTION ORAL DAILY
Qty: 1000 G | Refills: 5 | Status: ON HOLD | OUTPATIENT
Start: 2021-06-29 | End: 2021-10-20 | Stop reason: SDUPTHER

## 2021-06-29 RX ORDER — AMOXICILLIN 250 MG
2 CAPSULE ORAL 2 TIMES DAILY PRN
Qty: 120 TABLET | Refills: 1 | Status: SHIPPED | OUTPATIENT
Start: 2021-06-29 | End: 2021-10-14

## 2021-06-29 RX ORDER — ONDANSETRON 8 MG/1
8 TABLET, ORALLY DISINTEGRATING ORAL EVERY 8 HOURS PRN
Qty: 60 TABLET | Refills: 2 | Status: SHIPPED | OUTPATIENT
Start: 2021-06-29 | End: 2021-10-14

## 2021-06-30 ENCOUNTER — LAB VISIT (OUTPATIENT)
Dept: LAB | Facility: HOSPITAL | Age: 71
End: 2021-06-30
Payer: MEDICARE

## 2021-06-30 ENCOUNTER — OFFICE VISIT (OUTPATIENT)
Dept: SURGERY | Facility: CLINIC | Age: 71
End: 2021-06-30
Payer: MEDICARE

## 2021-06-30 VITALS
TEMPERATURE: 98 F | BODY MASS INDEX: 47.09 KG/M2 | RESPIRATION RATE: 18 BRPM | HEIGHT: 66 IN | DIASTOLIC BLOOD PRESSURE: 55 MMHG | HEART RATE: 75 BPM | WEIGHT: 293 LBS | SYSTOLIC BLOOD PRESSURE: 123 MMHG | OXYGEN SATURATION: 91 %

## 2021-06-30 DIAGNOSIS — I50.32 CHRONIC DIASTOLIC CONGESTIVE HEART FAILURE: ICD-10-CM

## 2021-06-30 DIAGNOSIS — R29.898 SEVERE MUSCLE DECONDITIONING: ICD-10-CM

## 2021-06-30 DIAGNOSIS — R54 FRAILTY: ICD-10-CM

## 2021-06-30 DIAGNOSIS — E55.9 VITAMIN D DEFICIENCY: ICD-10-CM

## 2021-06-30 DIAGNOSIS — R73.09 HEMOGLOBIN A1C ABOVE REFERENCE RANGE: ICD-10-CM

## 2021-06-30 DIAGNOSIS — E50.9 VITAMIN A DEFICIENCY: ICD-10-CM

## 2021-06-30 DIAGNOSIS — M25.562 LEFT KNEE PAIN, UNSPECIFIED CHRONICITY: ICD-10-CM

## 2021-06-30 DIAGNOSIS — R53.81 PHYSICAL DECONDITIONING: ICD-10-CM

## 2021-06-30 DIAGNOSIS — E66.01 CLASS 3 SEVERE OBESITY DUE TO EXCESS CALORIES WITH SERIOUS COMORBIDITY AND BODY MASS INDEX (BMI) OF 50.0 TO 59.9 IN ADULT: ICD-10-CM

## 2021-06-30 DIAGNOSIS — C16.9 GASTRIC ADENOCARCINOMA: Primary | ICD-10-CM

## 2021-06-30 DIAGNOSIS — R06.09 DYSPNEA ON EXERTION: ICD-10-CM

## 2021-06-30 DIAGNOSIS — F41.9 ANXIETY IN CANCER PATIENT: ICD-10-CM

## 2021-06-30 LAB
25(OH)D3+25(OH)D2 SERPL-MCNC: 56 NG/ML (ref 30–96)
ALBUMIN SERPL BCP-MCNC: 3.2 G/DL (ref 3.5–5.2)
ALP SERPL-CCNC: 92 U/L (ref 55–135)
ALT SERPL W/O P-5'-P-CCNC: 9 U/L (ref 10–44)
ANION GAP SERPL CALC-SCNC: 9 MMOL/L (ref 8–16)
AST SERPL-CCNC: 11 U/L (ref 10–40)
BASOPHILS # BLD AUTO: 0.03 K/UL (ref 0–0.2)
BASOPHILS NFR BLD: 0.3 % (ref 0–1.9)
BILIRUB SERPL-MCNC: 0.5 MG/DL (ref 0.1–1)
BUN SERPL-MCNC: 9 MG/DL (ref 8–23)
CALCIUM SERPL-MCNC: 9.2 MG/DL (ref 8.7–10.5)
CHLORIDE SERPL-SCNC: 110 MMOL/L (ref 95–110)
CO2 SERPL-SCNC: 24 MMOL/L (ref 23–29)
CREAT SERPL-MCNC: 0.8 MG/DL (ref 0.5–1.4)
CRP SERPL-MCNC: 13.4 MG/L (ref 0–8.2)
DIFFERENTIAL METHOD: ABNORMAL
EOSINOPHIL # BLD AUTO: 0.1 K/UL (ref 0–0.5)
EOSINOPHIL NFR BLD: 0.7 % (ref 0–8)
ERYTHROCYTE [DISTWIDTH] IN BLOOD BY AUTOMATED COUNT: 12.3 % (ref 11.5–14.5)
EST. GFR  (AFRICAN AMERICAN): >60 ML/MIN/1.73 M^2
EST. GFR  (NON AFRICAN AMERICAN): >60 ML/MIN/1.73 M^2
ESTIMATED AVG GLUCOSE: 80 MG/DL (ref 68–131)
GLUCOSE SERPL-MCNC: 97 MG/DL (ref 70–110)
HBA1C MFR BLD: 4.4 % (ref 4–5.6)
HCT VFR BLD AUTO: 36.8 % (ref 37–48.5)
HGB BLD-MCNC: 11.4 G/DL (ref 12–16)
IMM GRANULOCYTES # BLD AUTO: 0.02 K/UL (ref 0–0.04)
IMM GRANULOCYTES NFR BLD AUTO: 0.2 % (ref 0–0.5)
LYMPHOCYTES # BLD AUTO: 3.1 K/UL (ref 1–4.8)
LYMPHOCYTES NFR BLD: 27.2 % (ref 18–48)
MCH RBC QN AUTO: 31.1 PG (ref 27–31)
MCHC RBC AUTO-ENTMCNC: 31 G/DL (ref 32–36)
MCV RBC AUTO: 101 FL (ref 82–98)
MONOCYTES # BLD AUTO: 0.9 K/UL (ref 0.3–1)
MONOCYTES NFR BLD: 7.7 % (ref 4–15)
NEUTROPHILS # BLD AUTO: 7.2 K/UL (ref 1.8–7.7)
NEUTROPHILS NFR BLD: 63.9 % (ref 38–73)
NRBC BLD-RTO: 0 /100 WBC
PLATELET # BLD AUTO: 253 K/UL (ref 150–450)
PMV BLD AUTO: 10.8 FL (ref 9.2–12.9)
POTASSIUM SERPL-SCNC: 3.6 MMOL/L (ref 3.5–5.1)
PREALB SERPL-MCNC: 18 MG/DL (ref 20–43)
PROT SERPL-MCNC: 6.6 G/DL (ref 6–8.4)
RBC # BLD AUTO: 3.66 M/UL (ref 4–5.4)
SODIUM SERPL-SCNC: 143 MMOL/L (ref 136–145)
WBC # BLD AUTO: 11.27 K/UL (ref 3.9–12.7)

## 2021-06-30 PROCEDURE — 99999 PR PBB SHADOW E&M-EST. PATIENT-LVL IV: ICD-10-PCS | Mod: PBBFAC,,, | Performed by: NURSE PRACTITIONER

## 2021-06-30 PROCEDURE — 1126F PR PAIN SEVERITY QUANTIFIED, NO PAIN PRESENT: ICD-10-PCS | Mod: S$GLB,,, | Performed by: NURSE PRACTITIONER

## 2021-06-30 PROCEDURE — 86140 C-REACTIVE PROTEIN: CPT | Performed by: NURSE PRACTITIONER

## 2021-06-30 PROCEDURE — 80053 COMPREHEN METABOLIC PANEL: CPT | Performed by: NURSE PRACTITIONER

## 2021-06-30 PROCEDURE — 1159F PR MEDICATION LIST DOCUMENTED IN MEDICAL RECORD: ICD-10-PCS | Mod: S$GLB,,, | Performed by: NURSE PRACTITIONER

## 2021-06-30 PROCEDURE — 36415 COLL VENOUS BLD VENIPUNCTURE: CPT | Performed by: NURSE PRACTITIONER

## 2021-06-30 PROCEDURE — 82306 VITAMIN D 25 HYDROXY: CPT | Performed by: NURSE PRACTITIONER

## 2021-06-30 PROCEDURE — 84590 ASSAY OF VITAMIN A: CPT | Performed by: NURSE PRACTITIONER

## 2021-06-30 PROCEDURE — 99499 UNLISTED E&M SERVICE: CPT | Mod: S$GLB,,, | Performed by: NURSE PRACTITIONER

## 2021-06-30 PROCEDURE — 85025 COMPLETE CBC W/AUTO DIFF WBC: CPT | Performed by: NURSE PRACTITIONER

## 2021-06-30 PROCEDURE — 99215 PR OFFICE/OUTPT VISIT, EST, LEVL V, 40-54 MIN: ICD-10-PCS | Mod: S$GLB,,, | Performed by: NURSE PRACTITIONER

## 2021-06-30 PROCEDURE — 99499 RISK ADDL DX/OHS AUDIT: ICD-10-PCS | Mod: S$GLB,,, | Performed by: NURSE PRACTITIONER

## 2021-06-30 PROCEDURE — 3008F PR BODY MASS INDEX (BMI) DOCUMENTED: ICD-10-PCS | Mod: CPTII,S$GLB,, | Performed by: NURSE PRACTITIONER

## 2021-06-30 PROCEDURE — 3288F FALL RISK ASSESSMENT DOCD: CPT | Mod: CPTII,S$GLB,, | Performed by: NURSE PRACTITIONER

## 2021-06-30 PROCEDURE — 99215 OFFICE O/P EST HI 40 MIN: CPT | Mod: S$GLB,,, | Performed by: NURSE PRACTITIONER

## 2021-06-30 PROCEDURE — 3008F BODY MASS INDEX DOCD: CPT | Mod: CPTII,S$GLB,, | Performed by: NURSE PRACTITIONER

## 2021-06-30 PROCEDURE — 1159F MED LIST DOCD IN RCRD: CPT | Mod: S$GLB,,, | Performed by: NURSE PRACTITIONER

## 2021-06-30 PROCEDURE — 1101F PT FALLS ASSESS-DOCD LE1/YR: CPT | Mod: CPTII,S$GLB,, | Performed by: NURSE PRACTITIONER

## 2021-06-30 PROCEDURE — 84134 ASSAY OF PREALBUMIN: CPT | Performed by: NURSE PRACTITIONER

## 2021-06-30 PROCEDURE — 99417 PR PROLONGED SVC, OUTPT, W/WO DIRECT PT CONTACT,  EA ADDTL 15 MIN: ICD-10-PCS | Mod: S$GLB,,, | Performed by: NURSE PRACTITIONER

## 2021-06-30 PROCEDURE — 99417 PROLNG OP E/M EACH 15 MIN: CPT | Mod: S$GLB,,, | Performed by: NURSE PRACTITIONER

## 2021-06-30 PROCEDURE — 1101F PR PT FALLS ASSESS DOC 0-1 FALLS W/OUT INJ PAST YR: ICD-10-PCS | Mod: CPTII,S$GLB,, | Performed by: NURSE PRACTITIONER

## 2021-06-30 PROCEDURE — 99999 PR PBB SHADOW E&M-EST. PATIENT-LVL IV: CPT | Mod: PBBFAC,,, | Performed by: NURSE PRACTITIONER

## 2021-06-30 PROCEDURE — 1126F AMNT PAIN NOTED NONE PRSNT: CPT | Mod: S$GLB,,, | Performed by: NURSE PRACTITIONER

## 2021-06-30 PROCEDURE — 83036 HEMOGLOBIN GLYCOSYLATED A1C: CPT | Performed by: NURSE PRACTITIONER

## 2021-06-30 PROCEDURE — 3288F PR FALLS RISK ASSESSMENT DOCUMENTED: ICD-10-PCS | Mod: CPTII,S$GLB,, | Performed by: NURSE PRACTITIONER

## 2021-07-06 LAB — VIT A SERPL-MCNC: 41 UG/DL (ref 38–106)

## 2021-07-07 ENCOUNTER — HOSPITAL ENCOUNTER (OUTPATIENT)
Dept: CARDIOLOGY | Facility: OTHER | Age: 71
Discharge: HOME | End: 2021-07-07
Attending: STUDENT IN AN ORGANIZED HEALTH CARE EDUCATION/TRAINING PROGRAM
Payer: MEDICARE

## 2021-07-07 DIAGNOSIS — Z79.899 ENCOUNTER FOR MONITORING CARDIOTOXIC DRUG THERAPY: ICD-10-CM

## 2021-07-07 DIAGNOSIS — Z51.81 ENCOUNTER FOR MONITORING CARDIOTOXIC DRUG THERAPY: ICD-10-CM

## 2021-07-07 PROCEDURE — 93010 EKG 12-LEAD: ICD-10-PCS | Mod: ,,, | Performed by: INTERNAL MEDICINE

## 2021-07-07 PROCEDURE — 93005 ELECTROCARDIOGRAM TRACING: CPT

## 2021-07-07 PROCEDURE — 93010 ELECTROCARDIOGRAM REPORT: CPT | Mod: ,,, | Performed by: INTERNAL MEDICINE

## 2021-07-09 ENCOUNTER — HOSPITAL ENCOUNTER (OUTPATIENT)
Facility: OTHER | Age: 71
Discharge: HOME | End: 2021-07-09
Attending: RADIOLOGY | Admitting: RADIOLOGY
Payer: MEDICARE

## 2021-07-09 ENCOUNTER — TELEPHONE (OUTPATIENT)
Dept: HEMATOLOGY/ONCOLOGY | Facility: CLINIC | Age: 71
End: 2021-07-09

## 2021-07-09 VITALS
BODY MASS INDEX: 57.3 KG/M2 | OXYGEN SATURATION: 97 % | RESPIRATION RATE: 16 BRPM | DIASTOLIC BLOOD PRESSURE: 78 MMHG | TEMPERATURE: 98 F | HEART RATE: 77 BPM | WEIGHT: 293 LBS | SYSTOLIC BLOOD PRESSURE: 152 MMHG

## 2021-07-09 DIAGNOSIS — C80.1 CANCER: ICD-10-CM

## 2021-07-09 DIAGNOSIS — C16.9 GASTRIC ADENOCARCINOMA: ICD-10-CM

## 2021-07-09 PROCEDURE — 25000003 PHARM REV CODE 250: Performed by: RADIOLOGY

## 2021-07-09 PROCEDURE — 63600175 PHARM REV CODE 636 W HCPCS: Performed by: RADIOLOGY

## 2021-07-09 PROCEDURE — 99152 MOD SED SAME PHYS/QHP 5/>YRS: CPT | Performed by: RADIOLOGY

## 2021-07-09 PROCEDURE — C1769 GUIDE WIRE: HCPCS | Performed by: RADIOLOGY

## 2021-07-09 PROCEDURE — C1788 PORT, INDWELLING, IMP: HCPCS | Performed by: RADIOLOGY

## 2021-07-09 PROCEDURE — C1894 INTRO/SHEATH, NON-LASER: HCPCS | Performed by: RADIOLOGY

## 2021-07-09 DEVICE — POWERPORT CLEARVUE IMPLANTABLE PORT WITH ATTACHABLE 8F POLYURETHANE OPEN-ENDED SINGLE-LUMEN VENOUS CATHETER INTERMEDIATE KIT
Type: IMPLANTABLE DEVICE | Site: CHEST | Status: FUNCTIONAL
Brand: POWERPORT CLEARVUE

## 2021-07-09 RX ORDER — CEFAZOLIN SODIUM 1 G/3ML
2 INJECTION, POWDER, FOR SOLUTION INTRAMUSCULAR; INTRAVENOUS
Status: COMPLETED | OUTPATIENT
Start: 2021-07-09 | End: 2021-07-09

## 2021-07-09 RX ORDER — MIDAZOLAM HYDROCHLORIDE 1 MG/ML
INJECTION, SOLUTION INTRAMUSCULAR; INTRAVENOUS
Status: DISCONTINUED | OUTPATIENT
Start: 2021-07-09 | End: 2021-07-09 | Stop reason: HOSPADM

## 2021-07-09 RX ORDER — LIDOCAINE HYDROCHLORIDE 10 MG/ML
INJECTION INFILTRATION; PERINEURAL
Status: DISCONTINUED | OUTPATIENT
Start: 2021-07-09 | End: 2021-07-09 | Stop reason: HOSPADM

## 2021-07-09 RX ORDER — HEPARIN SODIUM 200 [USP'U]/100ML
INJECTION, SOLUTION INTRAVENOUS
Status: DISCONTINUED | OUTPATIENT
Start: 2021-07-09 | End: 2021-07-09 | Stop reason: HOSPADM

## 2021-07-09 RX ORDER — OXYCODONE HYDROCHLORIDE 5 MG/1
5 TABLET ORAL ONCE
Status: COMPLETED | OUTPATIENT
Start: 2021-07-09 | End: 2021-07-09

## 2021-07-09 RX ORDER — FENTANYL CITRATE 50 UG/ML
INJECTION, SOLUTION INTRAMUSCULAR; INTRAVENOUS
Status: DISCONTINUED | OUTPATIENT
Start: 2021-07-09 | End: 2021-07-09 | Stop reason: HOSPADM

## 2021-07-09 RX ORDER — SODIUM CHLORIDE 9 MG/ML
INJECTION, SOLUTION INTRAVENOUS CONTINUOUS
Status: DISCONTINUED | OUTPATIENT
Start: 2021-07-09 | End: 2021-07-09 | Stop reason: HOSPADM

## 2021-07-09 RX ADMIN — OXYCODONE HYDROCHLORIDE 5 MG: 5 TABLET ORAL at 12:07

## 2021-07-16 ENCOUNTER — TELEPHONE (OUTPATIENT)
Dept: INFUSION THERAPY | Facility: OTHER | Age: 71
End: 2021-07-16

## 2021-07-20 ENCOUNTER — LAB VISIT (OUTPATIENT)
Dept: LAB | Facility: OTHER | Age: 71
End: 2021-07-20
Payer: MEDICARE

## 2021-07-20 DIAGNOSIS — C16.9 GASTRIC ADENOCARCINOMA: ICD-10-CM

## 2021-07-20 LAB
ALBUMIN SERPL BCP-MCNC: 3.5 G/DL (ref 3.5–5.2)
ALP SERPL-CCNC: 91 U/L (ref 55–135)
ALT SERPL W/O P-5'-P-CCNC: 12 U/L (ref 10–44)
ANION GAP SERPL CALC-SCNC: 10 MMOL/L (ref 8–16)
AST SERPL-CCNC: 11 U/L (ref 10–40)
BASOPHILS # BLD AUTO: 0.03 K/UL (ref 0–0.2)
BASOPHILS NFR BLD: 0.2 % (ref 0–1.9)
BILIRUB SERPL-MCNC: 0.5 MG/DL (ref 0.1–1)
BUN SERPL-MCNC: 12 MG/DL (ref 8–23)
CALCIUM SERPL-MCNC: 9.7 MG/DL (ref 8.7–10.5)
CHLORIDE SERPL-SCNC: 107 MMOL/L (ref 95–110)
CO2 SERPL-SCNC: 26 MMOL/L (ref 23–29)
CREAT SERPL-MCNC: 0.8 MG/DL (ref 0.5–1.4)
DIFFERENTIAL METHOD: ABNORMAL
EOSINOPHIL # BLD AUTO: 0.1 K/UL (ref 0–0.5)
EOSINOPHIL NFR BLD: 1 % (ref 0–8)
ERYTHROCYTE [DISTWIDTH] IN BLOOD BY AUTOMATED COUNT: 11.9 % (ref 11.5–14.5)
EST. GFR  (AFRICAN AMERICAN): >60 ML/MIN/1.73 M^2
EST. GFR  (NON AFRICAN AMERICAN): >60 ML/MIN/1.73 M^2
GLUCOSE SERPL-MCNC: 92 MG/DL (ref 70–110)
HCT VFR BLD AUTO: 39.7 % (ref 37–48.5)
HGB BLD-MCNC: 12.4 G/DL (ref 12–16)
IMM GRANULOCYTES # BLD AUTO: 0.05 K/UL (ref 0–0.04)
IMM GRANULOCYTES NFR BLD AUTO: 0.4 % (ref 0–0.5)
LYMPHOCYTES # BLD AUTO: 3.6 K/UL (ref 1–4.8)
LYMPHOCYTES NFR BLD: 28.8 % (ref 18–48)
MAGNESIUM SERPL-MCNC: 2.1 MG/DL (ref 1.6–2.6)
MCH RBC QN AUTO: 31.4 PG (ref 27–31)
MCHC RBC AUTO-ENTMCNC: 31.2 G/DL (ref 32–36)
MCV RBC AUTO: 101 FL (ref 82–98)
MONOCYTES # BLD AUTO: 1 K/UL (ref 0.3–1)
MONOCYTES NFR BLD: 8 % (ref 4–15)
NEUTROPHILS # BLD AUTO: 7.7 K/UL (ref 1.8–7.7)
NEUTROPHILS NFR BLD: 61.6 % (ref 38–73)
NRBC BLD-RTO: 0 /100 WBC
PLATELET # BLD AUTO: 262 K/UL (ref 150–450)
PMV BLD AUTO: 10.8 FL (ref 9.2–12.9)
POTASSIUM SERPL-SCNC: 4.6 MMOL/L (ref 3.5–5.1)
PROT SERPL-MCNC: 7.1 G/DL (ref 6–8.4)
RBC # BLD AUTO: 3.95 M/UL (ref 4–5.4)
SODIUM SERPL-SCNC: 143 MMOL/L (ref 136–145)
WBC # BLD AUTO: 12.52 K/UL (ref 3.9–12.7)

## 2021-07-20 PROCEDURE — 80053 COMPREHEN METABOLIC PANEL: CPT | Performed by: STUDENT IN AN ORGANIZED HEALTH CARE EDUCATION/TRAINING PROGRAM

## 2021-07-20 PROCEDURE — 36415 COLL VENOUS BLD VENIPUNCTURE: CPT | Performed by: STUDENT IN AN ORGANIZED HEALTH CARE EDUCATION/TRAINING PROGRAM

## 2021-07-20 PROCEDURE — 83735 ASSAY OF MAGNESIUM: CPT | Performed by: STUDENT IN AN ORGANIZED HEALTH CARE EDUCATION/TRAINING PROGRAM

## 2021-07-20 PROCEDURE — 85025 COMPLETE CBC W/AUTO DIFF WBC: CPT | Performed by: STUDENT IN AN ORGANIZED HEALTH CARE EDUCATION/TRAINING PROGRAM

## 2021-07-21 ENCOUNTER — INFUSION (OUTPATIENT)
Dept: INFUSION THERAPY | Facility: OTHER | Age: 71
End: 2021-07-21
Attending: STUDENT IN AN ORGANIZED HEALTH CARE EDUCATION/TRAINING PROGRAM
Payer: MEDICARE

## 2021-07-21 ENCOUNTER — OFFICE VISIT (OUTPATIENT)
Dept: HEMATOLOGY/ONCOLOGY | Facility: CLINIC | Age: 71
End: 2021-07-21
Payer: MEDICARE

## 2021-07-21 VITALS
RESPIRATION RATE: 18 BRPM | WEIGHT: 293 LBS | OXYGEN SATURATION: 97 % | TEMPERATURE: 98 F | HEART RATE: 90 BPM | DIASTOLIC BLOOD PRESSURE: 68 MMHG | BODY MASS INDEX: 47.09 KG/M2 | SYSTOLIC BLOOD PRESSURE: 126 MMHG | HEIGHT: 66 IN

## 2021-07-21 DIAGNOSIS — C16.9 GASTRIC ADENOCARCINOMA: Primary | ICD-10-CM

## 2021-07-21 DIAGNOSIS — I50.32 CHRONIC DIASTOLIC CONGESTIVE HEART FAILURE: ICD-10-CM

## 2021-07-21 DIAGNOSIS — K21.9 GASTROESOPHAGEAL REFLUX DISEASE WITHOUT ESOPHAGITIS: ICD-10-CM

## 2021-07-21 DIAGNOSIS — D84.81 IMMUNODEFICIENCY DUE TO CONDITIONS CLASSIFIED ELSEWHERE: ICD-10-CM

## 2021-07-21 DIAGNOSIS — G47.33 OBSTRUCTIVE SLEEP APNEA: ICD-10-CM

## 2021-07-21 DIAGNOSIS — E66.01 CLASS 3 SEVERE OBESITY DUE TO EXCESS CALORIES WITH SERIOUS COMORBIDITY AND BODY MASS INDEX (BMI) OF 50.0 TO 59.9 IN ADULT: ICD-10-CM

## 2021-07-21 DIAGNOSIS — I10 ESSENTIAL HYPERTENSION: ICD-10-CM

## 2021-07-21 DIAGNOSIS — D50.0 IRON DEFICIENCY ANEMIA SECONDARY TO BLOOD LOSS (CHRONIC): ICD-10-CM

## 2021-07-21 DIAGNOSIS — D84.821 IMMUNODEFICIENCY DUE TO DRUG THERAPY: ICD-10-CM

## 2021-07-21 DIAGNOSIS — Z79.899 IMMUNODEFICIENCY DUE TO DRUG THERAPY: ICD-10-CM

## 2021-07-21 DIAGNOSIS — E78.5 HYPERLIPIDEMIA, UNSPECIFIED HYPERLIPIDEMIA TYPE: ICD-10-CM

## 2021-07-21 PROCEDURE — 3078F DIAST BP <80 MM HG: CPT | Mod: CPTII,S$GLB,, | Performed by: STUDENT IN AN ORGANIZED HEALTH CARE EDUCATION/TRAINING PROGRAM

## 2021-07-21 PROCEDURE — 96415 CHEMO IV INFUSION ADDL HR: CPT

## 2021-07-21 PROCEDURE — 99499 RISK ADDL DX/OHS AUDIT: ICD-10-PCS | Mod: HCNC,S$GLB,, | Performed by: STUDENT IN AN ORGANIZED HEALTH CARE EDUCATION/TRAINING PROGRAM

## 2021-07-21 PROCEDURE — 3288F FALL RISK ASSESSMENT DOCD: CPT | Mod: CPTII,S$GLB,, | Performed by: STUDENT IN AN ORGANIZED HEALTH CARE EDUCATION/TRAINING PROGRAM

## 2021-07-21 PROCEDURE — 3074F SYST BP LT 130 MM HG: CPT | Mod: CPTII,S$GLB,, | Performed by: STUDENT IN AN ORGANIZED HEALTH CARE EDUCATION/TRAINING PROGRAM

## 2021-07-21 PROCEDURE — 3288F PR FALLS RISK ASSESSMENT DOCUMENTED: ICD-10-PCS | Mod: CPTII,S$GLB,, | Performed by: STUDENT IN AN ORGANIZED HEALTH CARE EDUCATION/TRAINING PROGRAM

## 2021-07-21 PROCEDURE — 1101F PT FALLS ASSESS-DOCD LE1/YR: CPT | Mod: CPTII,S$GLB,, | Performed by: STUDENT IN AN ORGANIZED HEALTH CARE EDUCATION/TRAINING PROGRAM

## 2021-07-21 PROCEDURE — 96416 CHEMO PROLONG INFUSE W/PUMP: CPT

## 2021-07-21 PROCEDURE — 99215 OFFICE O/P EST HI 40 MIN: CPT | Mod: S$GLB,,, | Performed by: STUDENT IN AN ORGANIZED HEALTH CARE EDUCATION/TRAINING PROGRAM

## 2021-07-21 PROCEDURE — 25000003 PHARM REV CODE 250: Performed by: STUDENT IN AN ORGANIZED HEALTH CARE EDUCATION/TRAINING PROGRAM

## 2021-07-21 PROCEDURE — 99499 UNLISTED E&M SERVICE: CPT | Mod: HCNC,S$GLB,, | Performed by: STUDENT IN AN ORGANIZED HEALTH CARE EDUCATION/TRAINING PROGRAM

## 2021-07-21 PROCEDURE — 99999 PR PBB SHADOW E&M-EST. PATIENT-LVL IV: CPT | Mod: PBBFAC,,, | Performed by: STUDENT IN AN ORGANIZED HEALTH CARE EDUCATION/TRAINING PROGRAM

## 2021-07-21 PROCEDURE — 1126F AMNT PAIN NOTED NONE PRSNT: CPT | Mod: CPTII,S$GLB,, | Performed by: STUDENT IN AN ORGANIZED HEALTH CARE EDUCATION/TRAINING PROGRAM

## 2021-07-21 PROCEDURE — 3008F PR BODY MASS INDEX (BMI) DOCUMENTED: ICD-10-PCS | Mod: CPTII,S$GLB,, | Performed by: STUDENT IN AN ORGANIZED HEALTH CARE EDUCATION/TRAINING PROGRAM

## 2021-07-21 PROCEDURE — 3078F PR MOST RECENT DIASTOLIC BLOOD PRESSURE < 80 MM HG: ICD-10-PCS | Mod: CPTII,S$GLB,, | Performed by: STUDENT IN AN ORGANIZED HEALTH CARE EDUCATION/TRAINING PROGRAM

## 2021-07-21 PROCEDURE — 63600175 PHARM REV CODE 636 W HCPCS: Performed by: STUDENT IN AN ORGANIZED HEALTH CARE EDUCATION/TRAINING PROGRAM

## 2021-07-21 PROCEDURE — 1126F PR PAIN SEVERITY QUANTIFIED, NO PAIN PRESENT: ICD-10-PCS | Mod: CPTII,S$GLB,, | Performed by: STUDENT IN AN ORGANIZED HEALTH CARE EDUCATION/TRAINING PROGRAM

## 2021-07-21 PROCEDURE — 1159F MED LIST DOCD IN RCRD: CPT | Mod: CPTII,S$GLB,, | Performed by: STUDENT IN AN ORGANIZED HEALTH CARE EDUCATION/TRAINING PROGRAM

## 2021-07-21 PROCEDURE — 3008F BODY MASS INDEX DOCD: CPT | Mod: CPTII,S$GLB,, | Performed by: STUDENT IN AN ORGANIZED HEALTH CARE EDUCATION/TRAINING PROGRAM

## 2021-07-21 PROCEDURE — 99215 PR OFFICE/OUTPT VISIT, EST, LEVL V, 40-54 MIN: ICD-10-PCS | Mod: S$GLB,,, | Performed by: STUDENT IN AN ORGANIZED HEALTH CARE EDUCATION/TRAINING PROGRAM

## 2021-07-21 PROCEDURE — 96367 TX/PROPH/DG ADDL SEQ IV INF: CPT

## 2021-07-21 PROCEDURE — 96417 CHEMO IV INFUS EACH ADDL SEQ: CPT

## 2021-07-21 PROCEDURE — 96368 THER/DIAG CONCURRENT INF: CPT

## 2021-07-21 PROCEDURE — 99999 PR PBB SHADOW E&M-EST. PATIENT-LVL IV: ICD-10-PCS | Mod: PBBFAC,,, | Performed by: STUDENT IN AN ORGANIZED HEALTH CARE EDUCATION/TRAINING PROGRAM

## 2021-07-21 PROCEDURE — 3074F PR MOST RECENT SYSTOLIC BLOOD PRESSURE < 130 MM HG: ICD-10-PCS | Mod: CPTII,S$GLB,, | Performed by: STUDENT IN AN ORGANIZED HEALTH CARE EDUCATION/TRAINING PROGRAM

## 2021-07-21 PROCEDURE — 1101F PR PT FALLS ASSESS DOC 0-1 FALLS W/OUT INJ PAST YR: ICD-10-PCS | Mod: CPTII,S$GLB,, | Performed by: STUDENT IN AN ORGANIZED HEALTH CARE EDUCATION/TRAINING PROGRAM

## 2021-07-21 PROCEDURE — 1159F PR MEDICATION LIST DOCUMENTED IN MEDICAL RECORD: ICD-10-PCS | Mod: CPTII,S$GLB,, | Performed by: STUDENT IN AN ORGANIZED HEALTH CARE EDUCATION/TRAINING PROGRAM

## 2021-07-21 PROCEDURE — 96413 CHEMO IV INFUSION 1 HR: CPT

## 2021-07-21 RX ORDER — EPINEPHRINE 0.3 MG/.3ML
0.3 INJECTION SUBCUTANEOUS ONCE AS NEEDED
Status: CANCELLED | OUTPATIENT
Start: 2021-07-21

## 2021-07-21 RX ORDER — SODIUM CHLORIDE 0.9 % (FLUSH) 0.9 %
10 SYRINGE (ML) INJECTION
Status: CANCELLED | OUTPATIENT
Start: 2021-07-22

## 2021-07-21 RX ORDER — METHYLPREDNISOLONE SOD SUCC 125 MG
125 VIAL (EA) INJECTION ONCE AS NEEDED
Status: CANCELLED | OUTPATIENT
Start: 2021-07-21

## 2021-07-21 RX ORDER — DIPHENHYDRAMINE HYDROCHLORIDE 50 MG/ML
50 INJECTION INTRAMUSCULAR; INTRAVENOUS ONCE AS NEEDED
Status: CANCELLED | OUTPATIENT
Start: 2021-07-21

## 2021-07-21 RX ORDER — HEPARIN 100 UNIT/ML
500 SYRINGE INTRAVENOUS
Status: CANCELLED | OUTPATIENT
Start: 2021-07-21

## 2021-07-21 RX ORDER — SODIUM CHLORIDE 0.9 % (FLUSH) 0.9 %
10 SYRINGE (ML) INJECTION
Status: DISCONTINUED | OUTPATIENT
Start: 2021-07-21 | End: 2021-07-21 | Stop reason: HOSPADM

## 2021-07-21 RX ORDER — SODIUM CHLORIDE 9 MG/ML
INJECTION, SOLUTION INTRAVENOUS CONTINUOUS
Status: CANCELLED | OUTPATIENT
Start: 2021-07-21

## 2021-07-21 RX ORDER — HEPARIN 100 UNIT/ML
500 SYRINGE INTRAVENOUS
Status: DISCONTINUED | OUTPATIENT
Start: 2021-07-21 | End: 2021-07-21 | Stop reason: HOSPADM

## 2021-07-21 RX ORDER — EPINEPHRINE 0.3 MG/.3ML
0.3 INJECTION SUBCUTANEOUS ONCE AS NEEDED
Status: DISCONTINUED | OUTPATIENT
Start: 2021-07-21 | End: 2021-07-21 | Stop reason: HOSPADM

## 2021-07-21 RX ORDER — HEPARIN 100 UNIT/ML
500 SYRINGE INTRAVENOUS
Status: CANCELLED | OUTPATIENT
Start: 2021-07-22

## 2021-07-21 RX ORDER — SODIUM CHLORIDE 0.9 % (FLUSH) 0.9 %
10 SYRINGE (ML) INJECTION
Status: CANCELLED | OUTPATIENT
Start: 2021-07-21

## 2021-07-21 RX ORDER — HEPARIN 100 UNIT/ML
5 SYRINGE INTRAVENOUS
Status: CANCELLED | OUTPATIENT
Start: 2021-07-21

## 2021-07-21 RX ORDER — DIPHENHYDRAMINE HYDROCHLORIDE 50 MG/ML
50 INJECTION INTRAMUSCULAR; INTRAVENOUS ONCE AS NEEDED
Status: DISCONTINUED | OUTPATIENT
Start: 2021-07-21 | End: 2021-07-21 | Stop reason: HOSPADM

## 2021-07-21 RX ADMIN — FLUOROURACIL 7000 MG: 50 INJECTION, SOLUTION INTRAVENOUS at 01:07

## 2021-07-21 RX ADMIN — SODIUM CHLORIDE: 0.9 INJECTION, SOLUTION INTRAVENOUS at 09:07

## 2021-07-21 RX ADMIN — OXALIPLATIN 232 MG: 5 INJECTION, SOLUTION INTRAVENOUS at 11:07

## 2021-07-21 RX ADMIN — SODIUM CHLORIDE 135 MG: 9 INJECTION, SOLUTION INTRAVENOUS at 10:07

## 2021-07-21 RX ADMIN — DEXAMETHASONE SODIUM PHOSPHATE 0.25 MG: 4 INJECTION, SOLUTION INTRAMUSCULAR; INTRAVENOUS at 09:07

## 2021-07-21 RX ADMIN — DEXTROSE: 5 SOLUTION INTRAVENOUS at 11:07

## 2021-07-21 RX ADMIN — LEUCOVORIN CALCIUM 545 MG: 350 INJECTION, POWDER, LYOPHILIZED, FOR SOLUTION INTRAMUSCULAR; INTRAVENOUS at 11:07

## 2021-07-22 ENCOUNTER — INFUSION (OUTPATIENT)
Dept: INFUSION THERAPY | Facility: OTHER | Age: 71
End: 2021-07-22
Attending: STUDENT IN AN ORGANIZED HEALTH CARE EDUCATION/TRAINING PROGRAM
Payer: MEDICARE

## 2021-07-22 VITALS
TEMPERATURE: 98 F | RESPIRATION RATE: 20 BRPM | DIASTOLIC BLOOD PRESSURE: 63 MMHG | SYSTOLIC BLOOD PRESSURE: 127 MMHG | OXYGEN SATURATION: 94 % | HEART RATE: 96 BPM

## 2021-07-22 DIAGNOSIS — C16.9 GASTRIC ADENOCARCINOMA: Primary | ICD-10-CM

## 2021-07-22 PROCEDURE — 25000003 PHARM REV CODE 250: Performed by: STUDENT IN AN ORGANIZED HEALTH CARE EDUCATION/TRAINING PROGRAM

## 2021-07-22 PROCEDURE — 96521 REFILL/MAINT PORTABLE PUMP: CPT

## 2021-07-22 PROCEDURE — 63600175 PHARM REV CODE 636 W HCPCS: Performed by: STUDENT IN AN ORGANIZED HEALTH CARE EDUCATION/TRAINING PROGRAM

## 2021-07-22 PROCEDURE — A4216 STERILE WATER/SALINE, 10 ML: HCPCS | Performed by: STUDENT IN AN ORGANIZED HEALTH CARE EDUCATION/TRAINING PROGRAM

## 2021-07-22 RX ORDER — SODIUM CHLORIDE 0.9 % (FLUSH) 0.9 %
10 SYRINGE (ML) INJECTION
Status: DISCONTINUED | OUTPATIENT
Start: 2021-07-22 | End: 2021-07-22 | Stop reason: HOSPADM

## 2021-07-22 RX ORDER — HEPARIN 100 UNIT/ML
500 SYRINGE INTRAVENOUS
Status: DISCONTINUED | OUTPATIENT
Start: 2021-07-22 | End: 2021-07-22 | Stop reason: HOSPADM

## 2021-07-22 RX ADMIN — Medication 10 ML: at 02:07

## 2021-07-22 RX ADMIN — HEPARIN 500 UNITS: 100 SYRINGE at 02:07

## 2021-07-23 DIAGNOSIS — G89.29 CHRONIC PAIN OF LEFT KNEE: ICD-10-CM

## 2021-07-23 DIAGNOSIS — M25.562 CHRONIC PAIN OF LEFT KNEE: ICD-10-CM

## 2021-07-23 DIAGNOSIS — G89.4 CHRONIC PAIN DISORDER: Primary | ICD-10-CM

## 2021-07-23 DIAGNOSIS — G89.29 OTHER CHRONIC PAIN: ICD-10-CM

## 2021-07-23 RX ORDER — OXYCODONE AND ACETAMINOPHEN 5; 325 MG/1; MG/1
1 TABLET ORAL EVERY 8 HOURS PRN
Qty: 90 TABLET | Refills: 0 | Status: SHIPPED | OUTPATIENT
Start: 2021-07-24 | End: 2021-08-23 | Stop reason: SDUPTHER

## 2021-07-26 ENCOUNTER — TELEPHONE (OUTPATIENT)
Dept: PAIN MEDICINE | Facility: CLINIC | Age: 71
End: 2021-07-26

## 2021-07-27 ENCOUNTER — OFFICE VISIT (OUTPATIENT)
Dept: PAIN MEDICINE | Facility: CLINIC | Age: 71
End: 2021-07-27
Payer: MEDICARE

## 2021-07-27 VITALS
WEIGHT: 293 LBS | DIASTOLIC BLOOD PRESSURE: 61 MMHG | BODY MASS INDEX: 47.09 KG/M2 | HEART RATE: 102 BPM | RESPIRATION RATE: 18 BRPM | SYSTOLIC BLOOD PRESSURE: 94 MMHG | HEIGHT: 66 IN

## 2021-07-27 DIAGNOSIS — C16.9 GASTRIC ADENOCARCINOMA: ICD-10-CM

## 2021-07-27 DIAGNOSIS — G89.29 CHRONIC PAIN OF LEFT KNEE: Primary | ICD-10-CM

## 2021-07-27 DIAGNOSIS — C80.1 CANCER: ICD-10-CM

## 2021-07-27 DIAGNOSIS — E66.01 MORBID OBESITY: ICD-10-CM

## 2021-07-27 DIAGNOSIS — M17.12 PRIMARY OSTEOARTHRITIS OF LEFT KNEE: ICD-10-CM

## 2021-07-27 DIAGNOSIS — G89.4 CHRONIC PAIN DISORDER: ICD-10-CM

## 2021-07-27 DIAGNOSIS — M25.562 CHRONIC PAIN OF LEFT KNEE: Primary | ICD-10-CM

## 2021-07-27 PROCEDURE — 1159F MED LIST DOCD IN RCRD: CPT | Mod: CPTII,S$GLB,, | Performed by: ANESTHESIOLOGY

## 2021-07-27 PROCEDURE — 1160F PR REVIEW ALL MEDS BY PRESCRIBER/CLIN PHARMACIST DOCUMENTED: ICD-10-PCS | Mod: CPTII,S$GLB,, | Performed by: ANESTHESIOLOGY

## 2021-07-27 PROCEDURE — 1159F PR MEDICATION LIST DOCUMENTED IN MEDICAL RECORD: ICD-10-PCS | Mod: CPTII,S$GLB,, | Performed by: ANESTHESIOLOGY

## 2021-07-27 PROCEDURE — 1125F PR PAIN SEVERITY QUANTIFIED, PAIN PRESENT: ICD-10-PCS | Mod: CPTII,S$GLB,, | Performed by: ANESTHESIOLOGY

## 2021-07-27 PROCEDURE — 1101F PR PT FALLS ASSESS DOC 0-1 FALLS W/OUT INJ PAST YR: ICD-10-PCS | Mod: CPTII,S$GLB,, | Performed by: ANESTHESIOLOGY

## 2021-07-27 PROCEDURE — 3078F DIAST BP <80 MM HG: CPT | Mod: CPTII,S$GLB,, | Performed by: ANESTHESIOLOGY

## 2021-07-27 PROCEDURE — 3008F PR BODY MASS INDEX (BMI) DOCUMENTED: ICD-10-PCS | Mod: CPTII,S$GLB,, | Performed by: ANESTHESIOLOGY

## 2021-07-27 PROCEDURE — 99214 PR OFFICE/OUTPT VISIT, EST, LEVL IV, 30-39 MIN: ICD-10-PCS | Mod: S$GLB,,, | Performed by: ANESTHESIOLOGY

## 2021-07-27 PROCEDURE — 99999 PR PBB SHADOW E&M-EST. PATIENT-LVL IV: CPT | Mod: PBBFAC,,, | Performed by: ANESTHESIOLOGY

## 2021-07-27 PROCEDURE — 3074F SYST BP LT 130 MM HG: CPT | Mod: CPTII,S$GLB,, | Performed by: ANESTHESIOLOGY

## 2021-07-27 PROCEDURE — 1101F PT FALLS ASSESS-DOCD LE1/YR: CPT | Mod: CPTII,S$GLB,, | Performed by: ANESTHESIOLOGY

## 2021-07-27 PROCEDURE — 1125F AMNT PAIN NOTED PAIN PRSNT: CPT | Mod: CPTII,S$GLB,, | Performed by: ANESTHESIOLOGY

## 2021-07-27 PROCEDURE — 3288F PR FALLS RISK ASSESSMENT DOCUMENTED: ICD-10-PCS | Mod: CPTII,S$GLB,, | Performed by: ANESTHESIOLOGY

## 2021-07-27 PROCEDURE — 3008F BODY MASS INDEX DOCD: CPT | Mod: CPTII,S$GLB,, | Performed by: ANESTHESIOLOGY

## 2021-07-27 PROCEDURE — 99999 PR PBB SHADOW E&M-EST. PATIENT-LVL IV: ICD-10-PCS | Mod: PBBFAC,,, | Performed by: ANESTHESIOLOGY

## 2021-07-27 PROCEDURE — 3074F PR MOST RECENT SYSTOLIC BLOOD PRESSURE < 130 MM HG: ICD-10-PCS | Mod: CPTII,S$GLB,, | Performed by: ANESTHESIOLOGY

## 2021-07-27 PROCEDURE — 99214 OFFICE O/P EST MOD 30 MIN: CPT | Mod: S$GLB,,, | Performed by: ANESTHESIOLOGY

## 2021-07-27 PROCEDURE — 3078F PR MOST RECENT DIASTOLIC BLOOD PRESSURE < 80 MM HG: ICD-10-PCS | Mod: CPTII,S$GLB,, | Performed by: ANESTHESIOLOGY

## 2021-07-27 PROCEDURE — 3288F FALL RISK ASSESSMENT DOCD: CPT | Mod: CPTII,S$GLB,, | Performed by: ANESTHESIOLOGY

## 2021-07-27 PROCEDURE — 1160F RVW MEDS BY RX/DR IN RCRD: CPT | Mod: CPTII,S$GLB,, | Performed by: ANESTHESIOLOGY

## 2021-07-29 ENCOUNTER — OFFICE VISIT (OUTPATIENT)
Dept: HEMATOLOGY/ONCOLOGY | Facility: CLINIC | Age: 71
End: 2021-07-29
Payer: MEDICARE

## 2021-07-29 ENCOUNTER — LAB VISIT (OUTPATIENT)
Dept: LAB | Facility: OTHER | Age: 71
End: 2021-07-29
Attending: STUDENT IN AN ORGANIZED HEALTH CARE EDUCATION/TRAINING PROGRAM
Payer: MEDICARE

## 2021-07-29 VITALS
SYSTOLIC BLOOD PRESSURE: 107 MMHG | DIASTOLIC BLOOD PRESSURE: 55 MMHG | RESPIRATION RATE: 18 BRPM | BODY MASS INDEX: 47.09 KG/M2 | HEIGHT: 66 IN | OXYGEN SATURATION: 98 % | TEMPERATURE: 99 F | WEIGHT: 293 LBS | HEART RATE: 89 BPM

## 2021-07-29 DIAGNOSIS — E66.01 MORBID OBESITY: ICD-10-CM

## 2021-07-29 DIAGNOSIS — G89.29 CHRONIC PAIN OF LEFT KNEE: ICD-10-CM

## 2021-07-29 DIAGNOSIS — C16.9 GASTRIC ADENOCARCINOMA: ICD-10-CM

## 2021-07-29 DIAGNOSIS — D84.821 IMMUNODEFICIENCY DUE TO DRUG THERAPY: ICD-10-CM

## 2021-07-29 DIAGNOSIS — Z79.899 IMMUNODEFICIENCY DUE TO DRUG THERAPY: ICD-10-CM

## 2021-07-29 DIAGNOSIS — C16.9 MALIGNANT NEOPLASM OF STOMACH, UNSPECIFIED LOCATION: ICD-10-CM

## 2021-07-29 DIAGNOSIS — I10 ESSENTIAL HYPERTENSION: ICD-10-CM

## 2021-07-29 DIAGNOSIS — I50.32 CHRONIC DIASTOLIC CONGESTIVE HEART FAILURE: ICD-10-CM

## 2021-07-29 DIAGNOSIS — C16.9 GASTRIC ADENOCARCINOMA: Primary | ICD-10-CM

## 2021-07-29 DIAGNOSIS — R53.83 FATIGUE DUE TO TREATMENT: ICD-10-CM

## 2021-07-29 DIAGNOSIS — K21.9 GASTROESOPHAGEAL REFLUX DISEASE WITHOUT ESOPHAGITIS: ICD-10-CM

## 2021-07-29 DIAGNOSIS — M25.562 CHRONIC PAIN OF LEFT KNEE: ICD-10-CM

## 2021-07-29 LAB
ALBUMIN SERPL BCP-MCNC: 3.5 G/DL (ref 3.5–5.2)
ALP SERPL-CCNC: 73 U/L (ref 55–135)
ALT SERPL W/O P-5'-P-CCNC: 17 U/L (ref 10–44)
ANION GAP SERPL CALC-SCNC: 9 MMOL/L (ref 8–16)
AST SERPL-CCNC: 13 U/L (ref 10–40)
BASOPHILS # BLD AUTO: 0.02 K/UL (ref 0–0.2)
BASOPHILS NFR BLD: 0.4 % (ref 0–1.9)
BILIRUB SERPL-MCNC: 0.6 MG/DL (ref 0.1–1)
BUN SERPL-MCNC: 27 MG/DL (ref 8–23)
CALCIUM SERPL-MCNC: 9.1 MG/DL (ref 8.7–10.5)
CHLORIDE SERPL-SCNC: 108 MMOL/L (ref 95–110)
CO2 SERPL-SCNC: 22 MMOL/L (ref 23–29)
CREAT SERPL-MCNC: 0.9 MG/DL (ref 0.5–1.4)
DIFFERENTIAL METHOD: ABNORMAL
EOSINOPHIL # BLD AUTO: 0 K/UL (ref 0–0.5)
EOSINOPHIL NFR BLD: 0.6 % (ref 0–8)
ERYTHROCYTE [DISTWIDTH] IN BLOOD BY AUTOMATED COUNT: 11.7 % (ref 11.5–14.5)
EST. GFR  (AFRICAN AMERICAN): >60 ML/MIN/1.73 M^2
EST. GFR  (NON AFRICAN AMERICAN): >60 ML/MIN/1.73 M^2
FERRITIN SERPL-MCNC: 260 NG/ML (ref 20–300)
GLUCOSE SERPL-MCNC: 128 MG/DL (ref 70–110)
HCT VFR BLD AUTO: 37.5 % (ref 37–48.5)
HGB BLD-MCNC: 11.9 G/DL (ref 12–16)
IMM GRANULOCYTES # BLD AUTO: 0.04 K/UL (ref 0–0.04)
IMM GRANULOCYTES NFR BLD AUTO: 0.9 % (ref 0–0.5)
LYMPHOCYTES # BLD AUTO: 1.6 K/UL (ref 1–4.8)
LYMPHOCYTES NFR BLD: 33.9 % (ref 18–48)
MAGNESIUM SERPL-MCNC: 2.1 MG/DL (ref 1.6–2.6)
MCH RBC QN AUTO: 31 PG (ref 27–31)
MCHC RBC AUTO-ENTMCNC: 31.7 G/DL (ref 32–36)
MCV RBC AUTO: 98 FL (ref 82–98)
MONOCYTES # BLD AUTO: 0.2 K/UL (ref 0.3–1)
MONOCYTES NFR BLD: 4.5 % (ref 4–15)
NEUTROPHILS # BLD AUTO: 2.8 K/UL (ref 1.8–7.7)
NEUTROPHILS NFR BLD: 59.7 % (ref 38–73)
NRBC BLD-RTO: 0 /100 WBC
PLATELET # BLD AUTO: 154 K/UL (ref 150–450)
PMV BLD AUTO: 11.6 FL (ref 9.2–12.9)
POTASSIUM SERPL-SCNC: 4.5 MMOL/L (ref 3.5–5.1)
PROT SERPL-MCNC: 6.8 G/DL (ref 6–8.4)
RBC # BLD AUTO: 3.84 M/UL (ref 4–5.4)
SODIUM SERPL-SCNC: 139 MMOL/L (ref 136–145)
WBC # BLD AUTO: 4.63 K/UL (ref 3.9–12.7)

## 2021-07-29 PROCEDURE — 82728 ASSAY OF FERRITIN: CPT | Performed by: STUDENT IN AN ORGANIZED HEALTH CARE EDUCATION/TRAINING PROGRAM

## 2021-07-29 PROCEDURE — 1101F PR PT FALLS ASSESS DOC 0-1 FALLS W/OUT INJ PAST YR: ICD-10-PCS | Mod: CPTII,S$GLB,, | Performed by: STUDENT IN AN ORGANIZED HEALTH CARE EDUCATION/TRAINING PROGRAM

## 2021-07-29 PROCEDURE — 3074F PR MOST RECENT SYSTOLIC BLOOD PRESSURE < 130 MM HG: ICD-10-PCS | Mod: CPTII,S$GLB,, | Performed by: STUDENT IN AN ORGANIZED HEALTH CARE EDUCATION/TRAINING PROGRAM

## 2021-07-29 PROCEDURE — 3078F PR MOST RECENT DIASTOLIC BLOOD PRESSURE < 80 MM HG: ICD-10-PCS | Mod: CPTII,S$GLB,, | Performed by: STUDENT IN AN ORGANIZED HEALTH CARE EDUCATION/TRAINING PROGRAM

## 2021-07-29 PROCEDURE — 99999 PR PBB SHADOW E&M-EST. PATIENT-LVL III: CPT | Mod: PBBFAC,,, | Performed by: STUDENT IN AN ORGANIZED HEALTH CARE EDUCATION/TRAINING PROGRAM

## 2021-07-29 PROCEDURE — 1159F PR MEDICATION LIST DOCUMENTED IN MEDICAL RECORD: ICD-10-PCS | Mod: CPTII,S$GLB,, | Performed by: STUDENT IN AN ORGANIZED HEALTH CARE EDUCATION/TRAINING PROGRAM

## 2021-07-29 PROCEDURE — 3288F PR FALLS RISK ASSESSMENT DOCUMENTED: ICD-10-PCS | Mod: CPTII,S$GLB,, | Performed by: STUDENT IN AN ORGANIZED HEALTH CARE EDUCATION/TRAINING PROGRAM

## 2021-07-29 PROCEDURE — 85025 COMPLETE CBC W/AUTO DIFF WBC: CPT | Performed by: STUDENT IN AN ORGANIZED HEALTH CARE EDUCATION/TRAINING PROGRAM

## 2021-07-29 PROCEDURE — 1126F PR PAIN SEVERITY QUANTIFIED, NO PAIN PRESENT: ICD-10-PCS | Mod: CPTII,S$GLB,, | Performed by: STUDENT IN AN ORGANIZED HEALTH CARE EDUCATION/TRAINING PROGRAM

## 2021-07-29 PROCEDURE — 83735 ASSAY OF MAGNESIUM: CPT | Performed by: STUDENT IN AN ORGANIZED HEALTH CARE EDUCATION/TRAINING PROGRAM

## 2021-07-29 PROCEDURE — 1126F AMNT PAIN NOTED NONE PRSNT: CPT | Mod: CPTII,S$GLB,, | Performed by: STUDENT IN AN ORGANIZED HEALTH CARE EDUCATION/TRAINING PROGRAM

## 2021-07-29 PROCEDURE — 1101F PT FALLS ASSESS-DOCD LE1/YR: CPT | Mod: CPTII,S$GLB,, | Performed by: STUDENT IN AN ORGANIZED HEALTH CARE EDUCATION/TRAINING PROGRAM

## 2021-07-29 PROCEDURE — 1160F RVW MEDS BY RX/DR IN RCRD: CPT | Mod: CPTII,S$GLB,, | Performed by: STUDENT IN AN ORGANIZED HEALTH CARE EDUCATION/TRAINING PROGRAM

## 2021-07-29 PROCEDURE — 36415 COLL VENOUS BLD VENIPUNCTURE: CPT | Performed by: STUDENT IN AN ORGANIZED HEALTH CARE EDUCATION/TRAINING PROGRAM

## 2021-07-29 PROCEDURE — 3044F HG A1C LEVEL LT 7.0%: CPT | Mod: CPTII,S$GLB,, | Performed by: STUDENT IN AN ORGANIZED HEALTH CARE EDUCATION/TRAINING PROGRAM

## 2021-07-29 PROCEDURE — 3078F DIAST BP <80 MM HG: CPT | Mod: CPTII,S$GLB,, | Performed by: STUDENT IN AN ORGANIZED HEALTH CARE EDUCATION/TRAINING PROGRAM

## 2021-07-29 PROCEDURE — 99215 PR OFFICE/OUTPT VISIT, EST, LEVL V, 40-54 MIN: ICD-10-PCS | Mod: S$GLB,,, | Performed by: STUDENT IN AN ORGANIZED HEALTH CARE EDUCATION/TRAINING PROGRAM

## 2021-07-29 PROCEDURE — 1160F PR REVIEW ALL MEDS BY PRESCRIBER/CLIN PHARMACIST DOCUMENTED: ICD-10-PCS | Mod: CPTII,S$GLB,, | Performed by: STUDENT IN AN ORGANIZED HEALTH CARE EDUCATION/TRAINING PROGRAM

## 2021-07-29 PROCEDURE — 1159F MED LIST DOCD IN RCRD: CPT | Mod: CPTII,S$GLB,, | Performed by: STUDENT IN AN ORGANIZED HEALTH CARE EDUCATION/TRAINING PROGRAM

## 2021-07-29 PROCEDURE — 3008F PR BODY MASS INDEX (BMI) DOCUMENTED: ICD-10-PCS | Mod: CPTII,S$GLB,, | Performed by: STUDENT IN AN ORGANIZED HEALTH CARE EDUCATION/TRAINING PROGRAM

## 2021-07-29 PROCEDURE — 3044F PR MOST RECENT HEMOGLOBIN A1C LEVEL <7.0%: ICD-10-PCS | Mod: CPTII,S$GLB,, | Performed by: STUDENT IN AN ORGANIZED HEALTH CARE EDUCATION/TRAINING PROGRAM

## 2021-07-29 PROCEDURE — 99499 UNLISTED E&M SERVICE: CPT | Mod: HCNC,S$GLB,, | Performed by: STUDENT IN AN ORGANIZED HEALTH CARE EDUCATION/TRAINING PROGRAM

## 2021-07-29 PROCEDURE — 3008F BODY MASS INDEX DOCD: CPT | Mod: CPTII,S$GLB,, | Performed by: STUDENT IN AN ORGANIZED HEALTH CARE EDUCATION/TRAINING PROGRAM

## 2021-07-29 PROCEDURE — 3074F SYST BP LT 130 MM HG: CPT | Mod: CPTII,S$GLB,, | Performed by: STUDENT IN AN ORGANIZED HEALTH CARE EDUCATION/TRAINING PROGRAM

## 2021-07-29 PROCEDURE — 80053 COMPREHEN METABOLIC PANEL: CPT | Performed by: STUDENT IN AN ORGANIZED HEALTH CARE EDUCATION/TRAINING PROGRAM

## 2021-07-29 PROCEDURE — 99215 OFFICE O/P EST HI 40 MIN: CPT | Mod: S$GLB,,, | Performed by: STUDENT IN AN ORGANIZED HEALTH CARE EDUCATION/TRAINING PROGRAM

## 2021-07-29 PROCEDURE — 3288F FALL RISK ASSESSMENT DOCD: CPT | Mod: CPTII,S$GLB,, | Performed by: STUDENT IN AN ORGANIZED HEALTH CARE EDUCATION/TRAINING PROGRAM

## 2021-07-29 PROCEDURE — 99499 RISK ADDL DX/OHS AUDIT: ICD-10-PCS | Mod: HCNC,S$GLB,, | Performed by: STUDENT IN AN ORGANIZED HEALTH CARE EDUCATION/TRAINING PROGRAM

## 2021-07-29 PROCEDURE — 99999 PR PBB SHADOW E&M-EST. PATIENT-LVL III: ICD-10-PCS | Mod: PBBFAC,,, | Performed by: STUDENT IN AN ORGANIZED HEALTH CARE EDUCATION/TRAINING PROGRAM

## 2021-07-30 ENCOUNTER — HOSPITAL ENCOUNTER (OUTPATIENT)
Facility: OTHER | Age: 71
Discharge: HOME | End: 2021-07-30
Attending: ANESTHESIOLOGY | Admitting: ANESTHESIOLOGY
Payer: MEDICARE

## 2021-07-30 VITALS
DIASTOLIC BLOOD PRESSURE: 55 MMHG | RESPIRATION RATE: 14 BRPM | HEART RATE: 80 BPM | OXYGEN SATURATION: 100 % | SYSTOLIC BLOOD PRESSURE: 113 MMHG

## 2021-07-30 DIAGNOSIS — M17.12 PRIMARY OSTEOARTHRITIS OF LEFT KNEE: Primary | ICD-10-CM

## 2021-07-30 DIAGNOSIS — G89.29 CHRONIC PAIN: ICD-10-CM

## 2021-07-30 PROCEDURE — 64624 PR DESTRUCT, NEUROLYTIC AGENT, GENICULAR NERVE, W/IMG: ICD-10-PCS | Mod: LT,,, | Performed by: ANESTHESIOLOGY

## 2021-07-30 PROCEDURE — 64624 DSTRJ NULYT AGT GNCLR NRV: CPT | Mod: LT,,, | Performed by: ANESTHESIOLOGY

## 2021-07-30 PROCEDURE — 64624 DSTRJ NULYT AGT GNCLR NRV: CPT | Mod: LT | Performed by: ANESTHESIOLOGY

## 2021-07-30 PROCEDURE — 25000003 PHARM REV CODE 250: Performed by: ANESTHESIOLOGY

## 2021-07-30 PROCEDURE — 63600175 PHARM REV CODE 636 W HCPCS: Performed by: ANESTHESIOLOGY

## 2021-07-30 PROCEDURE — A4649 SURGICAL SUPPLIES: HCPCS | Performed by: ANESTHESIOLOGY

## 2021-07-30 RX ORDER — MIDAZOLAM HYDROCHLORIDE 1 MG/ML
INJECTION INTRAMUSCULAR; INTRAVENOUS
Status: DISCONTINUED | OUTPATIENT
Start: 2021-07-30 | End: 2021-07-30 | Stop reason: HOSPADM

## 2021-07-30 RX ORDER — SODIUM CHLORIDE 9 MG/ML
INJECTION, SOLUTION INTRAVENOUS CONTINUOUS
Status: DISCONTINUED | OUTPATIENT
Start: 2021-07-30 | End: 2021-07-30 | Stop reason: HOSPADM

## 2021-07-30 RX ORDER — DEXAMETHASONE SODIUM PHOSPHATE 4 MG/ML
INJECTION, SOLUTION INTRA-ARTICULAR; INTRALESIONAL; INTRAMUSCULAR; INTRAVENOUS; SOFT TISSUE
Status: DISCONTINUED | OUTPATIENT
Start: 2021-07-30 | End: 2021-07-30 | Stop reason: HOSPADM

## 2021-07-30 RX ORDER — FENTANYL CITRATE 50 UG/ML
INJECTION, SOLUTION INTRAMUSCULAR; INTRAVENOUS
Status: DISCONTINUED | OUTPATIENT
Start: 2021-07-30 | End: 2021-07-30 | Stop reason: HOSPADM

## 2021-07-30 RX ORDER — BUPIVACAINE HYDROCHLORIDE 2.5 MG/ML
INJECTION, SOLUTION EPIDURAL; INFILTRATION; INTRACAUDAL
Status: DISCONTINUED | OUTPATIENT
Start: 2021-07-30 | End: 2021-07-30 | Stop reason: HOSPADM

## 2021-07-30 RX ORDER — LIDOCAINE HYDROCHLORIDE 10 MG/ML
INJECTION INFILTRATION; PERINEURAL
Status: DISCONTINUED | OUTPATIENT
Start: 2021-07-30 | End: 2021-07-30 | Stop reason: HOSPADM

## 2021-07-30 RX ADMIN — SODIUM CHLORIDE: 0.9 INJECTION, SOLUTION INTRAVENOUS at 07:07

## 2021-08-03 ENCOUNTER — OFFICE VISIT (OUTPATIENT)
Dept: INTERNAL MEDICINE | Facility: CLINIC | Age: 71
End: 2021-08-03
Payer: MEDICARE

## 2021-08-03 ENCOUNTER — LAB VISIT (OUTPATIENT)
Dept: LAB | Facility: HOSPITAL | Age: 71
End: 2021-08-03
Attending: INTERNAL MEDICINE
Payer: MEDICARE

## 2021-08-03 VITALS
HEIGHT: 66 IN | SYSTOLIC BLOOD PRESSURE: 118 MMHG | WEIGHT: 293 LBS | HEART RATE: 78 BPM | OXYGEN SATURATION: 97 % | DIASTOLIC BLOOD PRESSURE: 82 MMHG | BODY MASS INDEX: 47.09 KG/M2

## 2021-08-03 DIAGNOSIS — C16.9 GASTRIC ADENOCARCINOMA: ICD-10-CM

## 2021-08-03 DIAGNOSIS — F43.20 ADJUSTMENT DISORDER, UNSPECIFIED TYPE: Primary | ICD-10-CM

## 2021-08-03 LAB
ALBUMIN SERPL BCP-MCNC: 3.3 G/DL (ref 3.5–5.2)
ALP SERPL-CCNC: 73 U/L (ref 55–135)
ALT SERPL W/O P-5'-P-CCNC: 18 U/L (ref 10–44)
ANION GAP SERPL CALC-SCNC: 6 MMOL/L (ref 8–16)
AST SERPL-CCNC: 14 U/L (ref 10–40)
BASO STIPL BLD QL SMEAR: ABNORMAL
BASOPHILS # BLD AUTO: 0.04 K/UL (ref 0–0.2)
BASOPHILS NFR BLD: 0.9 % (ref 0–1.9)
BILIRUB SERPL-MCNC: 0.4 MG/DL (ref 0.1–1)
BUN SERPL-MCNC: 16 MG/DL (ref 8–23)
CALCIUM SERPL-MCNC: 8.8 MG/DL (ref 8.7–10.5)
CHLORIDE SERPL-SCNC: 107 MMOL/L (ref 95–110)
CO2 SERPL-SCNC: 25 MMOL/L (ref 23–29)
CREAT SERPL-MCNC: 0.8 MG/DL (ref 0.5–1.4)
DIFFERENTIAL METHOD: ABNORMAL
EOSINOPHIL # BLD AUTO: 0 K/UL (ref 0–0.5)
EOSINOPHIL NFR BLD: 0.4 % (ref 0–8)
ERYTHROCYTE [DISTWIDTH] IN BLOOD BY AUTOMATED COUNT: 12.2 % (ref 11.5–14.5)
EST. GFR  (AFRICAN AMERICAN): >60 ML/MIN/1.73 M^2
EST. GFR  (NON AFRICAN AMERICAN): >60 ML/MIN/1.73 M^2
GLUCOSE SERPL-MCNC: 89 MG/DL (ref 70–110)
HCT VFR BLD AUTO: 34.5 % (ref 37–48.5)
HGB BLD-MCNC: 10.9 G/DL (ref 12–16)
IMM GRANULOCYTES # BLD AUTO: 0.01 K/UL (ref 0–0.04)
IMM GRANULOCYTES NFR BLD AUTO: 0.2 % (ref 0–0.5)
LYMPHOCYTES # BLD AUTO: 2.5 K/UL (ref 1–4.8)
LYMPHOCYTES NFR BLD: 55.9 % (ref 18–48)
MAGNESIUM SERPL-MCNC: 2.2 MG/DL (ref 1.6–2.6)
MCH RBC QN AUTO: 30.9 PG (ref 27–31)
MCHC RBC AUTO-ENTMCNC: 31.6 G/DL (ref 32–36)
MCV RBC AUTO: 98 FL (ref 82–98)
MONOCYTES # BLD AUTO: 1.3 K/UL (ref 0.3–1)
MONOCYTES NFR BLD: 28.5 % (ref 4–15)
NEUTROPHILS # BLD AUTO: 0.6 K/UL (ref 1.8–7.7)
NEUTROPHILS NFR BLD: 14.1 % (ref 38–73)
NRBC BLD-RTO: 0 /100 WBC
PLATELET # BLD AUTO: 159 K/UL (ref 150–450)
PLATELET BLD QL SMEAR: ABNORMAL
PMV BLD AUTO: 11 FL (ref 9.2–12.9)
POTASSIUM SERPL-SCNC: 4.8 MMOL/L (ref 3.5–5.1)
PROT SERPL-MCNC: 6.1 G/DL (ref 6–8.4)
RBC # BLD AUTO: 3.53 M/UL (ref 4–5.4)
SMUDGE CELLS BLD QL SMEAR: PRESENT
SODIUM SERPL-SCNC: 138 MMOL/L (ref 136–145)
WBC # BLD AUTO: 4.49 K/UL (ref 3.9–12.7)

## 2021-08-03 PROCEDURE — 99214 PR OFFICE/OUTPT VISIT, EST, LEVL IV, 30-39 MIN: ICD-10-PCS | Mod: S$GLB,,, | Performed by: INTERNAL MEDICINE

## 2021-08-03 PROCEDURE — 3079F PR MOST RECENT DIASTOLIC BLOOD PRESSURE 80-89 MM HG: ICD-10-PCS | Mod: CPTII,S$GLB,, | Performed by: INTERNAL MEDICINE

## 2021-08-03 PROCEDURE — 3008F BODY MASS INDEX DOCD: CPT | Mod: CPTII,S$GLB,, | Performed by: INTERNAL MEDICINE

## 2021-08-03 PROCEDURE — 3044F HG A1C LEVEL LT 7.0%: CPT | Mod: CPTII,S$GLB,, | Performed by: INTERNAL MEDICINE

## 2021-08-03 PROCEDURE — 99999 PR PBB SHADOW E&M-EST. PATIENT-LVL V: CPT | Mod: PBBFAC,,, | Performed by: INTERNAL MEDICINE

## 2021-08-03 PROCEDURE — 83735 ASSAY OF MAGNESIUM: CPT | Performed by: STUDENT IN AN ORGANIZED HEALTH CARE EDUCATION/TRAINING PROGRAM

## 2021-08-03 PROCEDURE — 85025 COMPLETE CBC W/AUTO DIFF WBC: CPT | Performed by: STUDENT IN AN ORGANIZED HEALTH CARE EDUCATION/TRAINING PROGRAM

## 2021-08-03 PROCEDURE — 36415 COLL VENOUS BLD VENIPUNCTURE: CPT | Performed by: STUDENT IN AN ORGANIZED HEALTH CARE EDUCATION/TRAINING PROGRAM

## 2021-08-03 PROCEDURE — 3288F FALL RISK ASSESSMENT DOCD: CPT | Mod: CPTII,S$GLB,, | Performed by: INTERNAL MEDICINE

## 2021-08-03 PROCEDURE — 1101F PR PT FALLS ASSESS DOC 0-1 FALLS W/OUT INJ PAST YR: ICD-10-PCS | Mod: CPTII,S$GLB,, | Performed by: INTERNAL MEDICINE

## 2021-08-03 PROCEDURE — 80053 COMPREHEN METABOLIC PANEL: CPT | Performed by: STUDENT IN AN ORGANIZED HEALTH CARE EDUCATION/TRAINING PROGRAM

## 2021-08-03 PROCEDURE — 1126F PR PAIN SEVERITY QUANTIFIED, NO PAIN PRESENT: ICD-10-PCS | Mod: CPTII,S$GLB,, | Performed by: INTERNAL MEDICINE

## 2021-08-03 PROCEDURE — 3079F DIAST BP 80-89 MM HG: CPT | Mod: CPTII,S$GLB,, | Performed by: INTERNAL MEDICINE

## 2021-08-03 PROCEDURE — 99214 OFFICE O/P EST MOD 30 MIN: CPT | Mod: S$GLB,,, | Performed by: INTERNAL MEDICINE

## 2021-08-03 PROCEDURE — 1126F AMNT PAIN NOTED NONE PRSNT: CPT | Mod: CPTII,S$GLB,, | Performed by: INTERNAL MEDICINE

## 2021-08-03 PROCEDURE — 3044F PR MOST RECENT HEMOGLOBIN A1C LEVEL <7.0%: ICD-10-PCS | Mod: CPTII,S$GLB,, | Performed by: INTERNAL MEDICINE

## 2021-08-03 PROCEDURE — 3074F SYST BP LT 130 MM HG: CPT | Mod: CPTII,S$GLB,, | Performed by: INTERNAL MEDICINE

## 2021-08-03 PROCEDURE — 99999 PR PBB SHADOW E&M-EST. PATIENT-LVL V: ICD-10-PCS | Mod: PBBFAC,,, | Performed by: INTERNAL MEDICINE

## 2021-08-03 PROCEDURE — 3008F PR BODY MASS INDEX (BMI) DOCUMENTED: ICD-10-PCS | Mod: CPTII,S$GLB,, | Performed by: INTERNAL MEDICINE

## 2021-08-03 PROCEDURE — 3288F PR FALLS RISK ASSESSMENT DOCUMENTED: ICD-10-PCS | Mod: CPTII,S$GLB,, | Performed by: INTERNAL MEDICINE

## 2021-08-03 PROCEDURE — 1101F PT FALLS ASSESS-DOCD LE1/YR: CPT | Mod: CPTII,S$GLB,, | Performed by: INTERNAL MEDICINE

## 2021-08-03 PROCEDURE — 3074F PR MOST RECENT SYSTOLIC BLOOD PRESSURE < 130 MM HG: ICD-10-PCS | Mod: CPTII,S$GLB,, | Performed by: INTERNAL MEDICINE

## 2021-08-03 RX ORDER — CITALOPRAM 10 MG/1
10 TABLET ORAL DAILY
Qty: 30 TABLET | Refills: 0 | Status: SHIPPED | OUTPATIENT
Start: 2021-08-03 | End: 2021-08-27 | Stop reason: SDUPTHER

## 2021-08-04 ENCOUNTER — INFUSION (OUTPATIENT)
Dept: INFUSION THERAPY | Facility: OTHER | Age: 71
End: 2021-08-04
Attending: STUDENT IN AN ORGANIZED HEALTH CARE EDUCATION/TRAINING PROGRAM
Payer: MEDICARE

## 2021-08-04 ENCOUNTER — OFFICE VISIT (OUTPATIENT)
Dept: HEMATOLOGY/ONCOLOGY | Facility: CLINIC | Age: 71
End: 2021-08-04
Payer: MEDICARE

## 2021-08-04 VITALS
DIASTOLIC BLOOD PRESSURE: 57 MMHG | SYSTOLIC BLOOD PRESSURE: 113 MMHG | HEIGHT: 66 IN | RESPIRATION RATE: 16 BRPM | WEIGHT: 293 LBS | OXYGEN SATURATION: 97 % | HEART RATE: 77 BPM | TEMPERATURE: 99 F | BODY MASS INDEX: 47.09 KG/M2

## 2021-08-04 DIAGNOSIS — Z79.899 IMMUNODEFICIENCY DUE TO DRUG THERAPY: ICD-10-CM

## 2021-08-04 DIAGNOSIS — R53.83 FATIGUE DUE TO TREATMENT: ICD-10-CM

## 2021-08-04 DIAGNOSIS — I50.32 CHRONIC DIASTOLIC CONGESTIVE HEART FAILURE: ICD-10-CM

## 2021-08-04 DIAGNOSIS — C16.9 GASTRIC ADENOCARCINOMA: Primary | ICD-10-CM

## 2021-08-04 DIAGNOSIS — D70.1 CHEMOTHERAPY INDUCED NEUTROPENIA: ICD-10-CM

## 2021-08-04 DIAGNOSIS — I10 ESSENTIAL HYPERTENSION: ICD-10-CM

## 2021-08-04 DIAGNOSIS — T45.1X5A CHEMOTHERAPY INDUCED NEUTROPENIA: ICD-10-CM

## 2021-08-04 DIAGNOSIS — E78.5 HYPERLIPIDEMIA, UNSPECIFIED HYPERLIPIDEMIA TYPE: ICD-10-CM

## 2021-08-04 DIAGNOSIS — K21.9 GASTROESOPHAGEAL REFLUX DISEASE WITHOUT ESOPHAGITIS: ICD-10-CM

## 2021-08-04 DIAGNOSIS — D63.0 ANEMIA COMPLICATING NEOPLASTIC DISEASE: ICD-10-CM

## 2021-08-04 DIAGNOSIS — E66.01 MORBID OBESITY: ICD-10-CM

## 2021-08-04 DIAGNOSIS — C80.1 CANCER: ICD-10-CM

## 2021-08-04 DIAGNOSIS — D84.821 IMMUNODEFICIENCY DUE TO DRUG THERAPY: ICD-10-CM

## 2021-08-04 LAB
ANISOCYTOSIS BLD QL SMEAR: SLIGHT
BASOPHILS # BLD AUTO: 0.04 K/UL (ref 0–0.2)
BASOPHILS NFR BLD: 0.9 % (ref 0–1.9)
DIFFERENTIAL METHOD: ABNORMAL
EOSINOPHIL # BLD AUTO: 0 K/UL (ref 0–0.5)
EOSINOPHIL NFR BLD: 0.2 % (ref 0–8)
ERYTHROCYTE [DISTWIDTH] IN BLOOD BY AUTOMATED COUNT: 12.1 % (ref 11.5–14.5)
HCT VFR BLD AUTO: 35 % (ref 37–48.5)
HGB BLD-MCNC: 10.9 G/DL (ref 12–16)
IMM GRANULOCYTES # BLD AUTO: 0.02 K/UL (ref 0–0.04)
IMM GRANULOCYTES NFR BLD AUTO: 0.5 % (ref 0–0.5)
LYMPHOCYTES # BLD AUTO: 2.3 K/UL (ref 1–4.8)
LYMPHOCYTES NFR BLD: 53.7 % (ref 18–48)
MCH RBC QN AUTO: 31.2 PG (ref 27–31)
MCHC RBC AUTO-ENTMCNC: 31.1 G/DL (ref 32–36)
MCV RBC AUTO: 100 FL (ref 82–98)
MONOCYTES # BLD AUTO: 1.2 K/UL (ref 0.3–1)
MONOCYTES NFR BLD: 27.5 % (ref 4–15)
NEUTROPHILS # BLD AUTO: 0.8 K/UL (ref 1.8–7.7)
NEUTROPHILS NFR BLD: 17.2 % (ref 38–73)
NRBC BLD-RTO: 0 /100 WBC
PLATELET # BLD AUTO: 161 K/UL (ref 150–450)
PLATELET BLD QL SMEAR: ABNORMAL
PMV BLD AUTO: 10.8 FL (ref 9.2–12.9)
POIKILOCYTOSIS BLD QL SMEAR: SLIGHT
POLYCHROMASIA BLD QL SMEAR: ABNORMAL
RBC # BLD AUTO: 3.49 M/UL (ref 4–5.4)
WBC # BLD AUTO: 4.36 K/UL (ref 3.9–12.7)

## 2021-08-04 PROCEDURE — 1101F PR PT FALLS ASSESS DOC 0-1 FALLS W/OUT INJ PAST YR: ICD-10-PCS | Mod: CPTII,S$GLB,, | Performed by: STUDENT IN AN ORGANIZED HEALTH CARE EDUCATION/TRAINING PROGRAM

## 2021-08-04 PROCEDURE — 1126F AMNT PAIN NOTED NONE PRSNT: CPT | Mod: CPTII,S$GLB,, | Performed by: STUDENT IN AN ORGANIZED HEALTH CARE EDUCATION/TRAINING PROGRAM

## 2021-08-04 PROCEDURE — 36591 DRAW BLOOD OFF VENOUS DEVICE: CPT

## 2021-08-04 PROCEDURE — 3288F PR FALLS RISK ASSESSMENT DOCUMENTED: ICD-10-PCS | Mod: CPTII,S$GLB,, | Performed by: STUDENT IN AN ORGANIZED HEALTH CARE EDUCATION/TRAINING PROGRAM

## 2021-08-04 PROCEDURE — 1159F PR MEDICATION LIST DOCUMENTED IN MEDICAL RECORD: ICD-10-PCS | Mod: CPTII,S$GLB,, | Performed by: STUDENT IN AN ORGANIZED HEALTH CARE EDUCATION/TRAINING PROGRAM

## 2021-08-04 PROCEDURE — 99999 PR PBB SHADOW E&M-EST. PATIENT-LVL V: ICD-10-PCS | Mod: PBBFAC,,, | Performed by: STUDENT IN AN ORGANIZED HEALTH CARE EDUCATION/TRAINING PROGRAM

## 2021-08-04 PROCEDURE — 3044F PR MOST RECENT HEMOGLOBIN A1C LEVEL <7.0%: ICD-10-PCS | Mod: CPTII,S$GLB,, | Performed by: STUDENT IN AN ORGANIZED HEALTH CARE EDUCATION/TRAINING PROGRAM

## 2021-08-04 PROCEDURE — 1159F MED LIST DOCD IN RCRD: CPT | Mod: CPTII,S$GLB,, | Performed by: STUDENT IN AN ORGANIZED HEALTH CARE EDUCATION/TRAINING PROGRAM

## 2021-08-04 PROCEDURE — 63600175 PHARM REV CODE 636 W HCPCS: Performed by: STUDENT IN AN ORGANIZED HEALTH CARE EDUCATION/TRAINING PROGRAM

## 2021-08-04 PROCEDURE — 99215 PR OFFICE/OUTPT VISIT, EST, LEVL V, 40-54 MIN: ICD-10-PCS | Mod: S$GLB,,, | Performed by: STUDENT IN AN ORGANIZED HEALTH CARE EDUCATION/TRAINING PROGRAM

## 2021-08-04 PROCEDURE — 99999 PR PBB SHADOW E&M-EST. PATIENT-LVL V: CPT | Mod: PBBFAC,,, | Performed by: STUDENT IN AN ORGANIZED HEALTH CARE EDUCATION/TRAINING PROGRAM

## 2021-08-04 PROCEDURE — 3078F DIAST BP <80 MM HG: CPT | Mod: CPTII,S$GLB,, | Performed by: STUDENT IN AN ORGANIZED HEALTH CARE EDUCATION/TRAINING PROGRAM

## 2021-08-04 PROCEDURE — 99215 OFFICE O/P EST HI 40 MIN: CPT | Mod: S$GLB,,, | Performed by: STUDENT IN AN ORGANIZED HEALTH CARE EDUCATION/TRAINING PROGRAM

## 2021-08-04 PROCEDURE — 85025 COMPLETE CBC W/AUTO DIFF WBC: CPT | Performed by: STUDENT IN AN ORGANIZED HEALTH CARE EDUCATION/TRAINING PROGRAM

## 2021-08-04 PROCEDURE — 99499 UNLISTED E&M SERVICE: CPT | Mod: HCNC,S$GLB,, | Performed by: STUDENT IN AN ORGANIZED HEALTH CARE EDUCATION/TRAINING PROGRAM

## 2021-08-04 PROCEDURE — 1160F PR REVIEW ALL MEDS BY PRESCRIBER/CLIN PHARMACIST DOCUMENTED: ICD-10-PCS | Mod: CPTII,S$GLB,, | Performed by: STUDENT IN AN ORGANIZED HEALTH CARE EDUCATION/TRAINING PROGRAM

## 2021-08-04 PROCEDURE — 3074F PR MOST RECENT SYSTOLIC BLOOD PRESSURE < 130 MM HG: ICD-10-PCS | Mod: CPTII,S$GLB,, | Performed by: STUDENT IN AN ORGANIZED HEALTH CARE EDUCATION/TRAINING PROGRAM

## 2021-08-04 PROCEDURE — 3008F BODY MASS INDEX DOCD: CPT | Mod: CPTII,S$GLB,, | Performed by: STUDENT IN AN ORGANIZED HEALTH CARE EDUCATION/TRAINING PROGRAM

## 2021-08-04 PROCEDURE — 1101F PT FALLS ASSESS-DOCD LE1/YR: CPT | Mod: CPTII,S$GLB,, | Performed by: STUDENT IN AN ORGANIZED HEALTH CARE EDUCATION/TRAINING PROGRAM

## 2021-08-04 PROCEDURE — 3078F PR MOST RECENT DIASTOLIC BLOOD PRESSURE < 80 MM HG: ICD-10-PCS | Mod: CPTII,S$GLB,, | Performed by: STUDENT IN AN ORGANIZED HEALTH CARE EDUCATION/TRAINING PROGRAM

## 2021-08-04 PROCEDURE — 1126F PR PAIN SEVERITY QUANTIFIED, NO PAIN PRESENT: ICD-10-PCS | Mod: CPTII,S$GLB,, | Performed by: STUDENT IN AN ORGANIZED HEALTH CARE EDUCATION/TRAINING PROGRAM

## 2021-08-04 PROCEDURE — 3044F HG A1C LEVEL LT 7.0%: CPT | Mod: CPTII,S$GLB,, | Performed by: STUDENT IN AN ORGANIZED HEALTH CARE EDUCATION/TRAINING PROGRAM

## 2021-08-04 PROCEDURE — 1160F RVW MEDS BY RX/DR IN RCRD: CPT | Mod: CPTII,S$GLB,, | Performed by: STUDENT IN AN ORGANIZED HEALTH CARE EDUCATION/TRAINING PROGRAM

## 2021-08-04 PROCEDURE — 99499 RISK ADDL DX/OHS AUDIT: ICD-10-PCS | Mod: HCNC,S$GLB,, | Performed by: STUDENT IN AN ORGANIZED HEALTH CARE EDUCATION/TRAINING PROGRAM

## 2021-08-04 PROCEDURE — 3288F FALL RISK ASSESSMENT DOCD: CPT | Mod: CPTII,S$GLB,, | Performed by: STUDENT IN AN ORGANIZED HEALTH CARE EDUCATION/TRAINING PROGRAM

## 2021-08-04 PROCEDURE — 3074F SYST BP LT 130 MM HG: CPT | Mod: CPTII,S$GLB,, | Performed by: STUDENT IN AN ORGANIZED HEALTH CARE EDUCATION/TRAINING PROGRAM

## 2021-08-04 PROCEDURE — 3008F PR BODY MASS INDEX (BMI) DOCUMENTED: ICD-10-PCS | Mod: CPTII,S$GLB,, | Performed by: STUDENT IN AN ORGANIZED HEALTH CARE EDUCATION/TRAINING PROGRAM

## 2021-08-04 RX ORDER — HEPARIN 100 UNIT/ML
500 SYRINGE INTRAVENOUS
Status: COMPLETED | OUTPATIENT
Start: 2021-08-04 | End: 2021-08-04

## 2021-08-04 RX ADMIN — HEPARIN 500 UNITS: 100 SYRINGE at 12:08

## 2021-08-12 ENCOUNTER — HOSPITAL ENCOUNTER (OUTPATIENT)
Dept: RADIOLOGY | Facility: OTHER | Age: 71
Discharge: HOME | End: 2021-08-12
Attending: STUDENT IN AN ORGANIZED HEALTH CARE EDUCATION/TRAINING PROGRAM
Payer: MEDICARE

## 2021-08-12 ENCOUNTER — OFFICE VISIT (OUTPATIENT)
Dept: HEMATOLOGY/ONCOLOGY | Facility: CLINIC | Age: 71
End: 2021-08-12
Payer: MEDICARE

## 2021-08-12 ENCOUNTER — INFUSION (OUTPATIENT)
Dept: INFUSION THERAPY | Facility: OTHER | Age: 71
End: 2021-08-12
Attending: STUDENT IN AN ORGANIZED HEALTH CARE EDUCATION/TRAINING PROGRAM
Payer: MEDICARE

## 2021-08-12 VITALS — DIASTOLIC BLOOD PRESSURE: 60 MMHG | HEART RATE: 63 BPM | SYSTOLIC BLOOD PRESSURE: 130 MMHG

## 2021-08-12 VITALS
RESPIRATION RATE: 16 BRPM | DIASTOLIC BLOOD PRESSURE: 58 MMHG | SYSTOLIC BLOOD PRESSURE: 117 MMHG | HEART RATE: 77 BPM | BODY MASS INDEX: 47.09 KG/M2 | HEIGHT: 66 IN | WEIGHT: 293 LBS | OXYGEN SATURATION: 94 % | TEMPERATURE: 98 F

## 2021-08-12 DIAGNOSIS — E66.01 MORBID OBESITY: ICD-10-CM

## 2021-08-12 DIAGNOSIS — N61.0 BREAST INFECTION IN FEMALE: ICD-10-CM

## 2021-08-12 DIAGNOSIS — T45.1X5A CHEMOTHERAPY INDUCED NEUTROPENIA: ICD-10-CM

## 2021-08-12 DIAGNOSIS — R53.83 FATIGUE DUE TO TREATMENT: ICD-10-CM

## 2021-08-12 DIAGNOSIS — Z79.899 IMMUNODEFICIENCY DUE TO DRUG THERAPY: ICD-10-CM

## 2021-08-12 DIAGNOSIS — D70.1 CHEMOTHERAPY INDUCED NEUTROPENIA: ICD-10-CM

## 2021-08-12 DIAGNOSIS — I50.32 CHRONIC DIASTOLIC CONGESTIVE HEART FAILURE: ICD-10-CM

## 2021-08-12 DIAGNOSIS — C16.9 GASTRIC ADENOCARCINOMA: Primary | ICD-10-CM

## 2021-08-12 DIAGNOSIS — D84.821 IMMUNODEFICIENCY DUE TO DRUG THERAPY: ICD-10-CM

## 2021-08-12 DIAGNOSIS — R92.8 ABNORMAL MAMMOGRAPHY: ICD-10-CM

## 2021-08-12 DIAGNOSIS — I10 ESSENTIAL HYPERTENSION: ICD-10-CM

## 2021-08-12 PROCEDURE — 1160F RVW MEDS BY RX/DR IN RCRD: CPT | Mod: CPTII,S$GLB,, | Performed by: STUDENT IN AN ORGANIZED HEALTH CARE EDUCATION/TRAINING PROGRAM

## 2021-08-12 PROCEDURE — 96416 CHEMO PROLONG INFUSE W/PUMP: CPT

## 2021-08-12 PROCEDURE — 77061 MAMMO DIGITAL DIAGNOSTIC RIGHT WITH TOMO: ICD-10-PCS | Mod: 26,RT,, | Performed by: RADIOLOGY

## 2021-08-12 PROCEDURE — 3044F PR MOST RECENT HEMOGLOBIN A1C LEVEL <7.0%: ICD-10-PCS | Mod: CPTII,S$GLB,, | Performed by: STUDENT IN AN ORGANIZED HEALTH CARE EDUCATION/TRAINING PROGRAM

## 2021-08-12 PROCEDURE — 99999 PR PBB SHADOW E&M-EST. PATIENT-LVL V: ICD-10-PCS | Mod: PBBFAC,,, | Performed by: STUDENT IN AN ORGANIZED HEALTH CARE EDUCATION/TRAINING PROGRAM

## 2021-08-12 PROCEDURE — 77061 BREAST TOMOSYNTHESIS UNI: CPT | Mod: TC,RT

## 2021-08-12 PROCEDURE — 76642 US BREAST RIGHT LIMITED: ICD-10-PCS | Mod: 26,RT,, | Performed by: RADIOLOGY

## 2021-08-12 PROCEDURE — 99215 OFFICE O/P EST HI 40 MIN: CPT | Mod: S$GLB,,, | Performed by: STUDENT IN AN ORGANIZED HEALTH CARE EDUCATION/TRAINING PROGRAM

## 2021-08-12 PROCEDURE — 1125F AMNT PAIN NOTED PAIN PRSNT: CPT | Mod: CPTII,S$GLB,, | Performed by: STUDENT IN AN ORGANIZED HEALTH CARE EDUCATION/TRAINING PROGRAM

## 2021-08-12 PROCEDURE — 96368 THER/DIAG CONCURRENT INF: CPT

## 2021-08-12 PROCEDURE — 3008F BODY MASS INDEX DOCD: CPT | Mod: CPTII,S$GLB,, | Performed by: STUDENT IN AN ORGANIZED HEALTH CARE EDUCATION/TRAINING PROGRAM

## 2021-08-12 PROCEDURE — 99499 RISK ADDL DX/OHS AUDIT: ICD-10-PCS | Mod: HCNC,S$GLB,, | Performed by: STUDENT IN AN ORGANIZED HEALTH CARE EDUCATION/TRAINING PROGRAM

## 2021-08-12 PROCEDURE — 96413 CHEMO IV INFUSION 1 HR: CPT

## 2021-08-12 PROCEDURE — 3288F PR FALLS RISK ASSESSMENT DOCUMENTED: ICD-10-PCS | Mod: CPTII,S$GLB,, | Performed by: STUDENT IN AN ORGANIZED HEALTH CARE EDUCATION/TRAINING PROGRAM

## 2021-08-12 PROCEDURE — 25000003 PHARM REV CODE 250: Performed by: STUDENT IN AN ORGANIZED HEALTH CARE EDUCATION/TRAINING PROGRAM

## 2021-08-12 PROCEDURE — 76642 ULTRASOUND BREAST LIMITED: CPT | Mod: 26,RT,, | Performed by: RADIOLOGY

## 2021-08-12 PROCEDURE — 3044F HG A1C LEVEL LT 7.0%: CPT | Mod: CPTII,S$GLB,, | Performed by: STUDENT IN AN ORGANIZED HEALTH CARE EDUCATION/TRAINING PROGRAM

## 2021-08-12 PROCEDURE — 96417 CHEMO IV INFUS EACH ADDL SEQ: CPT

## 2021-08-12 PROCEDURE — 3078F PR MOST RECENT DIASTOLIC BLOOD PRESSURE < 80 MM HG: ICD-10-PCS | Mod: CPTII,S$GLB,, | Performed by: STUDENT IN AN ORGANIZED HEALTH CARE EDUCATION/TRAINING PROGRAM

## 2021-08-12 PROCEDURE — 1101F PR PT FALLS ASSESS DOC 0-1 FALLS W/OUT INJ PAST YR: ICD-10-PCS | Mod: CPTII,S$GLB,, | Performed by: STUDENT IN AN ORGANIZED HEALTH CARE EDUCATION/TRAINING PROGRAM

## 2021-08-12 PROCEDURE — 77065 DX MAMMO INCL CAD UNI: CPT | Mod: 26,RT,, | Performed by: RADIOLOGY

## 2021-08-12 PROCEDURE — 1159F MED LIST DOCD IN RCRD: CPT | Mod: CPTII,S$GLB,, | Performed by: STUDENT IN AN ORGANIZED HEALTH CARE EDUCATION/TRAINING PROGRAM

## 2021-08-12 PROCEDURE — 1159F PR MEDICATION LIST DOCUMENTED IN MEDICAL RECORD: ICD-10-PCS | Mod: CPTII,S$GLB,, | Performed by: STUDENT IN AN ORGANIZED HEALTH CARE EDUCATION/TRAINING PROGRAM

## 2021-08-12 PROCEDURE — 99215 PR OFFICE/OUTPT VISIT, EST, LEVL V, 40-54 MIN: ICD-10-PCS | Mod: S$GLB,,, | Performed by: STUDENT IN AN ORGANIZED HEALTH CARE EDUCATION/TRAINING PROGRAM

## 2021-08-12 PROCEDURE — 1125F PR PAIN SEVERITY QUANTIFIED, PAIN PRESENT: ICD-10-PCS | Mod: CPTII,S$GLB,, | Performed by: STUDENT IN AN ORGANIZED HEALTH CARE EDUCATION/TRAINING PROGRAM

## 2021-08-12 PROCEDURE — 3008F PR BODY MASS INDEX (BMI) DOCUMENTED: ICD-10-PCS | Mod: CPTII,S$GLB,, | Performed by: STUDENT IN AN ORGANIZED HEALTH CARE EDUCATION/TRAINING PROGRAM

## 2021-08-12 PROCEDURE — 96367 TX/PROPH/DG ADDL SEQ IV INF: CPT

## 2021-08-12 PROCEDURE — 77065 MAMMO DIGITAL DIAGNOSTIC RIGHT WITH TOMO: ICD-10-PCS | Mod: 26,RT,, | Performed by: RADIOLOGY

## 2021-08-12 PROCEDURE — 3074F PR MOST RECENT SYSTOLIC BLOOD PRESSURE < 130 MM HG: ICD-10-PCS | Mod: CPTII,S$GLB,, | Performed by: STUDENT IN AN ORGANIZED HEALTH CARE EDUCATION/TRAINING PROGRAM

## 2021-08-12 PROCEDURE — 96415 CHEMO IV INFUSION ADDL HR: CPT

## 2021-08-12 PROCEDURE — 99499 UNLISTED E&M SERVICE: CPT | Mod: HCNC,S$GLB,, | Performed by: STUDENT IN AN ORGANIZED HEALTH CARE EDUCATION/TRAINING PROGRAM

## 2021-08-12 PROCEDURE — 3288F FALL RISK ASSESSMENT DOCD: CPT | Mod: CPTII,S$GLB,, | Performed by: STUDENT IN AN ORGANIZED HEALTH CARE EDUCATION/TRAINING PROGRAM

## 2021-08-12 PROCEDURE — 3078F DIAST BP <80 MM HG: CPT | Mod: CPTII,S$GLB,, | Performed by: STUDENT IN AN ORGANIZED HEALTH CARE EDUCATION/TRAINING PROGRAM

## 2021-08-12 PROCEDURE — 77061 BREAST TOMOSYNTHESIS UNI: CPT | Mod: 26,RT,, | Performed by: RADIOLOGY

## 2021-08-12 PROCEDURE — 99999 PR PBB SHADOW E&M-EST. PATIENT-LVL V: CPT | Mod: PBBFAC,,, | Performed by: STUDENT IN AN ORGANIZED HEALTH CARE EDUCATION/TRAINING PROGRAM

## 2021-08-12 PROCEDURE — 1101F PT FALLS ASSESS-DOCD LE1/YR: CPT | Mod: CPTII,S$GLB,, | Performed by: STUDENT IN AN ORGANIZED HEALTH CARE EDUCATION/TRAINING PROGRAM

## 2021-08-12 PROCEDURE — 3074F SYST BP LT 130 MM HG: CPT | Mod: CPTII,S$GLB,, | Performed by: STUDENT IN AN ORGANIZED HEALTH CARE EDUCATION/TRAINING PROGRAM

## 2021-08-12 PROCEDURE — 63600175 PHARM REV CODE 636 W HCPCS: Performed by: STUDENT IN AN ORGANIZED HEALTH CARE EDUCATION/TRAINING PROGRAM

## 2021-08-12 PROCEDURE — 1160F PR REVIEW ALL MEDS BY PRESCRIBER/CLIN PHARMACIST DOCUMENTED: ICD-10-PCS | Mod: CPTII,S$GLB,, | Performed by: STUDENT IN AN ORGANIZED HEALTH CARE EDUCATION/TRAINING PROGRAM

## 2021-08-12 PROCEDURE — 76642 ULTRASOUND BREAST LIMITED: CPT | Mod: TC,RT

## 2021-08-12 RX ORDER — LIDOCAINE AND PRILOCAINE 25; 25 MG/G; MG/G
CREAM TOPICAL
Qty: 30 G | Refills: 0 | Status: SHIPPED | OUTPATIENT
Start: 2021-08-12

## 2021-08-12 RX ORDER — SODIUM CHLORIDE 0.9 % (FLUSH) 0.9 %
10 SYRINGE (ML) INJECTION
Status: CANCELLED | OUTPATIENT
Start: 2021-08-12

## 2021-08-12 RX ORDER — CLINDAMYCIN HYDROCHLORIDE 300 MG/1
300 CAPSULE ORAL 3 TIMES DAILY
Qty: 42 CAPSULE | Refills: 0 | Status: SHIPPED | OUTPATIENT
Start: 2021-08-12 | End: 2021-08-26

## 2021-08-12 RX ORDER — DIPHENHYDRAMINE HYDROCHLORIDE 50 MG/ML
50 INJECTION INTRAMUSCULAR; INTRAVENOUS ONCE AS NEEDED
Status: CANCELLED | OUTPATIENT
Start: 2021-08-12

## 2021-08-12 RX ORDER — HEPARIN 100 UNIT/ML
500 SYRINGE INTRAVENOUS
Status: CANCELLED | OUTPATIENT
Start: 2021-08-13

## 2021-08-12 RX ORDER — EPINEPHRINE 0.3 MG/.3ML
0.3 INJECTION SUBCUTANEOUS ONCE AS NEEDED
Status: DISCONTINUED | OUTPATIENT
Start: 2021-08-12 | End: 2021-08-12 | Stop reason: HOSPADM

## 2021-08-12 RX ORDER — SODIUM CHLORIDE 0.9 % (FLUSH) 0.9 %
10 SYRINGE (ML) INJECTION
Status: CANCELLED | OUTPATIENT
Start: 2021-08-13

## 2021-08-12 RX ORDER — DIPHENHYDRAMINE HYDROCHLORIDE 50 MG/ML
50 INJECTION INTRAMUSCULAR; INTRAVENOUS ONCE AS NEEDED
Status: DISCONTINUED | OUTPATIENT
Start: 2021-08-12 | End: 2021-08-12 | Stop reason: HOSPADM

## 2021-08-12 RX ORDER — EPINEPHRINE 0.3 MG/.3ML
0.3 INJECTION SUBCUTANEOUS ONCE AS NEEDED
Status: CANCELLED | OUTPATIENT
Start: 2021-08-12

## 2021-08-12 RX ORDER — HEPARIN 100 UNIT/ML
500 SYRINGE INTRAVENOUS
Status: CANCELLED | OUTPATIENT
Start: 2021-08-12

## 2021-08-12 RX ORDER — HEPARIN 100 UNIT/ML
500 SYRINGE INTRAVENOUS
Status: DISCONTINUED | OUTPATIENT
Start: 2021-08-12 | End: 2021-08-12 | Stop reason: HOSPADM

## 2021-08-12 RX ORDER — SODIUM CHLORIDE 0.9 % (FLUSH) 0.9 %
10 SYRINGE (ML) INJECTION
Status: DISCONTINUED | OUTPATIENT
Start: 2021-08-12 | End: 2021-08-12 | Stop reason: HOSPADM

## 2021-08-12 RX ADMIN — FLUOROURACIL 6970 MG: 50 INJECTION, SOLUTION INTRAVENOUS at 12:08

## 2021-08-12 RX ADMIN — SODIUM CHLORIDE 135 MG: 9 INJECTION, SOLUTION INTRAVENOUS at 09:08

## 2021-08-12 RX ADMIN — SODIUM CHLORIDE: 0.9 INJECTION, SOLUTION INTRAVENOUS at 09:08

## 2021-08-12 RX ADMIN — OXALIPLATIN 228 MG: 5 INJECTION, SOLUTION INTRAVENOUS at 10:08

## 2021-08-12 RX ADMIN — LEUCOVORIN CALCIUM 535 MG: 350 INJECTION, POWDER, LYOPHILIZED, FOR SOLUTION INTRAMUSCULAR; INTRAVENOUS at 10:08

## 2021-08-12 RX ADMIN — DEXTROSE: 5 SOLUTION INTRAVENOUS at 10:08

## 2021-08-12 RX ADMIN — PALONOSETRON HYDROCHLORIDE 0.25 MG: 0.25 INJECTION, SOLUTION INTRAVENOUS at 09:08

## 2021-08-13 ENCOUNTER — DOCUMENTATION ONLY (OUTPATIENT)
Dept: HEMATOLOGY/ONCOLOGY | Facility: CLINIC | Age: 71
End: 2021-08-13

## 2021-08-13 ENCOUNTER — TELEPHONE (OUTPATIENT)
Dept: RADIOLOGY | Facility: OTHER | Age: 71
End: 2021-08-13

## 2021-08-13 ENCOUNTER — INFUSION (OUTPATIENT)
Dept: INFUSION THERAPY | Facility: OTHER | Age: 71
End: 2021-08-13
Attending: STUDENT IN AN ORGANIZED HEALTH CARE EDUCATION/TRAINING PROGRAM
Payer: MEDICARE

## 2021-08-13 VITALS
TEMPERATURE: 98 F | DIASTOLIC BLOOD PRESSURE: 55 MMHG | SYSTOLIC BLOOD PRESSURE: 111 MMHG | OXYGEN SATURATION: 95 % | HEART RATE: 104 BPM | RESPIRATION RATE: 17 BRPM

## 2021-08-13 DIAGNOSIS — C16.9 GASTRIC ADENOCARCINOMA: Primary | ICD-10-CM

## 2021-08-13 PROCEDURE — 63600175 PHARM REV CODE 636 W HCPCS: Performed by: STUDENT IN AN ORGANIZED HEALTH CARE EDUCATION/TRAINING PROGRAM

## 2021-08-13 PROCEDURE — 25000003 PHARM REV CODE 250: Performed by: STUDENT IN AN ORGANIZED HEALTH CARE EDUCATION/TRAINING PROGRAM

## 2021-08-13 PROCEDURE — A4216 STERILE WATER/SALINE, 10 ML: HCPCS | Performed by: STUDENT IN AN ORGANIZED HEALTH CARE EDUCATION/TRAINING PROGRAM

## 2021-08-13 PROCEDURE — 96521 REFILL/MAINT PORTABLE PUMP: CPT

## 2021-08-13 RX ORDER — SODIUM CHLORIDE 0.9 % (FLUSH) 0.9 %
10 SYRINGE (ML) INJECTION
Status: DISCONTINUED | OUTPATIENT
Start: 2021-08-13 | End: 2021-08-13 | Stop reason: HOSPADM

## 2021-08-13 RX ORDER — HEPARIN 100 UNIT/ML
500 SYRINGE INTRAVENOUS
Status: DISCONTINUED | OUTPATIENT
Start: 2021-08-13 | End: 2021-08-13 | Stop reason: HOSPADM

## 2021-08-13 RX ADMIN — SODIUM CHLORIDE, PRESERVATIVE FREE 10 ML: 5 INJECTION INTRAVENOUS at 01:08

## 2021-08-13 RX ADMIN — HEPARIN 500 UNITS: 100 SYRINGE at 01:08

## 2021-08-16 ENCOUNTER — DOCUMENTATION ONLY (OUTPATIENT)
Dept: HEMATOLOGY/ONCOLOGY | Facility: CLINIC | Age: 71
End: 2021-08-16

## 2021-08-17 ENCOUNTER — DOCUMENTATION ONLY (OUTPATIENT)
Dept: HEMATOLOGY/ONCOLOGY | Facility: CLINIC | Age: 71
End: 2021-08-17

## 2021-08-18 ENCOUNTER — TELEPHONE (OUTPATIENT)
Dept: RADIOLOGY | Facility: OTHER | Age: 71
End: 2021-08-18

## 2021-08-19 ENCOUNTER — TELEPHONE (OUTPATIENT)
Dept: RADIOLOGY | Facility: OTHER | Age: 71
End: 2021-08-19

## 2021-08-20 ENCOUNTER — TELEPHONE (OUTPATIENT)
Dept: RADIOLOGY | Facility: OTHER | Age: 71
End: 2021-08-20

## 2021-08-23 DIAGNOSIS — G89.4 CHRONIC PAIN DISORDER: ICD-10-CM

## 2021-08-23 DIAGNOSIS — G89.29 CHRONIC PAIN OF LEFT KNEE: ICD-10-CM

## 2021-08-23 DIAGNOSIS — M25.562 CHRONIC PAIN OF LEFT KNEE: ICD-10-CM

## 2021-08-23 DIAGNOSIS — G89.29 OTHER CHRONIC PAIN: ICD-10-CM

## 2021-08-23 RX ORDER — OXYCODONE AND ACETAMINOPHEN 5; 325 MG/1; MG/1
1 TABLET ORAL EVERY 8 HOURS PRN
Qty: 90 TABLET | Refills: 0 | Status: SHIPPED | OUTPATIENT
Start: 2021-08-23 | End: 2021-09-14 | Stop reason: SDUPTHER

## 2021-08-26 ENCOUNTER — OFFICE VISIT (OUTPATIENT)
Dept: HEMATOLOGY/ONCOLOGY | Facility: CLINIC | Age: 71
End: 2021-08-26
Payer: MEDICARE

## 2021-08-26 ENCOUNTER — LAB VISIT (OUTPATIENT)
Dept: LAB | Facility: OTHER | Age: 71
End: 2021-08-26
Attending: STUDENT IN AN ORGANIZED HEALTH CARE EDUCATION/TRAINING PROGRAM
Payer: MEDICARE

## 2021-08-26 ENCOUNTER — TELEPHONE (OUTPATIENT)
Dept: SURGERY | Facility: CLINIC | Age: 71
End: 2021-08-26

## 2021-08-26 VITALS
HEIGHT: 66 IN | RESPIRATION RATE: 20 BRPM | TEMPERATURE: 98 F | WEIGHT: 293 LBS | HEART RATE: 75 BPM | BODY MASS INDEX: 47.09 KG/M2 | DIASTOLIC BLOOD PRESSURE: 59 MMHG | OXYGEN SATURATION: 95 % | SYSTOLIC BLOOD PRESSURE: 102 MMHG

## 2021-08-26 DIAGNOSIS — C16.9 GASTRIC ADENOCARCINOMA: Primary | ICD-10-CM

## 2021-08-26 DIAGNOSIS — N61.1 BREAST ABSCESS: ICD-10-CM

## 2021-08-26 DIAGNOSIS — I50.32 CHRONIC DIASTOLIC CONGESTIVE HEART FAILURE: Chronic | ICD-10-CM

## 2021-08-26 DIAGNOSIS — I10 ESSENTIAL HYPERTENSION: ICD-10-CM

## 2021-08-26 DIAGNOSIS — C16.9 GASTRIC ADENOCARCINOMA: ICD-10-CM

## 2021-08-26 DIAGNOSIS — D50.0 IRON DEFICIENCY ANEMIA SECONDARY TO BLOOD LOSS (CHRONIC): ICD-10-CM

## 2021-08-26 DIAGNOSIS — E66.01 CLASS 3 SEVERE OBESITY DUE TO EXCESS CALORIES WITH SERIOUS COMORBIDITY AND BODY MASS INDEX (BMI) OF 50.0 TO 59.9 IN ADULT: ICD-10-CM

## 2021-08-26 DIAGNOSIS — D84.81 IMMUNODEFICIENCY DUE TO CONDITIONS CLASSIFIED ELSEWHERE: ICD-10-CM

## 2021-08-26 DIAGNOSIS — E66.01 MORBID OBESITY: ICD-10-CM

## 2021-08-26 DIAGNOSIS — D70.1 CHEMOTHERAPY INDUCED NEUTROPENIA: ICD-10-CM

## 2021-08-26 DIAGNOSIS — G47.33 OBSTRUCTIVE SLEEP APNEA: ICD-10-CM

## 2021-08-26 DIAGNOSIS — R53.83 FATIGUE DUE TO TREATMENT: ICD-10-CM

## 2021-08-26 DIAGNOSIS — T45.1X5A CHEMOTHERAPY INDUCED NEUTROPENIA: ICD-10-CM

## 2021-08-26 LAB
ALBUMIN SERPL BCP-MCNC: 3.3 G/DL (ref 3.5–5.2)
ALP SERPL-CCNC: 87 U/L (ref 55–135)
ALT SERPL W/O P-5'-P-CCNC: 22 U/L (ref 10–44)
ANION GAP SERPL CALC-SCNC: 8 MMOL/L (ref 8–16)
AST SERPL-CCNC: 18 U/L (ref 10–40)
BASOPHILS # BLD AUTO: ABNORMAL K/UL (ref 0–0.2)
BASOPHILS NFR BLD: 0 % (ref 0–1.9)
BILIRUB SERPL-MCNC: 0.8 MG/DL (ref 0.1–1)
BUN SERPL-MCNC: 18 MG/DL (ref 8–23)
CALCIUM SERPL-MCNC: 9 MG/DL (ref 8.7–10.5)
CHLORIDE SERPL-SCNC: 110 MMOL/L (ref 95–110)
CO2 SERPL-SCNC: 22 MMOL/L (ref 23–29)
CREAT SERPL-MCNC: 1 MG/DL (ref 0.5–1.4)
DIFFERENTIAL METHOD: ABNORMAL
EOSINOPHIL # BLD AUTO: ABNORMAL K/UL (ref 0–0.5)
EOSINOPHIL NFR BLD: 0 % (ref 0–8)
ERYTHROCYTE [DISTWIDTH] IN BLOOD BY AUTOMATED COUNT: 13 % (ref 11.5–14.5)
EST. GFR  (AFRICAN AMERICAN): >60 ML/MIN/1.73 M^2
EST. GFR  (NON AFRICAN AMERICAN): 58 ML/MIN/1.73 M^2
GLUCOSE SERPL-MCNC: 102 MG/DL (ref 70–110)
HCT VFR BLD AUTO: 35.5 % (ref 37–48.5)
HGB BLD-MCNC: 11.2 G/DL (ref 12–16)
IMM GRANULOCYTES # BLD AUTO: ABNORMAL K/UL (ref 0–0.04)
IMM GRANULOCYTES NFR BLD AUTO: ABNORMAL % (ref 0–0.5)
LYMPHOCYTES # BLD AUTO: ABNORMAL K/UL (ref 1–4.8)
LYMPHOCYTES NFR BLD: 56 % (ref 18–48)
MCH RBC QN AUTO: 30.8 PG (ref 27–31)
MCHC RBC AUTO-ENTMCNC: 31.5 G/DL (ref 32–36)
MCV RBC AUTO: 98 FL (ref 82–98)
METAMYELOCYTES NFR BLD MANUAL: 1 %
MONOCYTES # BLD AUTO: ABNORMAL K/UL (ref 0.3–1)
MONOCYTES NFR BLD: 12 % (ref 4–15)
MYELOCYTES NFR BLD MANUAL: 3 %
NEUTROPHILS NFR BLD: 22 % (ref 38–73)
NEUTS BAND NFR BLD MANUAL: 6 %
NRBC BLD-RTO: 0 /100 WBC
PLATELET # BLD AUTO: 198 K/UL (ref 150–450)
PMV BLD AUTO: 10.3 FL (ref 9.2–12.9)
POTASSIUM SERPL-SCNC: 3.4 MMOL/L (ref 3.5–5.1)
PROT SERPL-MCNC: 6.5 G/DL (ref 6–8.4)
RBC # BLD AUTO: 3.64 M/UL (ref 4–5.4)
SODIUM SERPL-SCNC: 140 MMOL/L (ref 136–145)
WBC # BLD AUTO: 4.04 K/UL (ref 3.9–12.7)

## 2021-08-26 PROCEDURE — 99499 UNLISTED E&M SERVICE: CPT | Mod: HCNC,S$GLB,, | Performed by: STUDENT IN AN ORGANIZED HEALTH CARE EDUCATION/TRAINING PROGRAM

## 2021-08-26 PROCEDURE — 36415 COLL VENOUS BLD VENIPUNCTURE: CPT | Performed by: STUDENT IN AN ORGANIZED HEALTH CARE EDUCATION/TRAINING PROGRAM

## 2021-08-26 PROCEDURE — 1159F PR MEDICATION LIST DOCUMENTED IN MEDICAL RECORD: ICD-10-PCS | Mod: CPTII,S$GLB,, | Performed by: STUDENT IN AN ORGANIZED HEALTH CARE EDUCATION/TRAINING PROGRAM

## 2021-08-26 PROCEDURE — 3008F BODY MASS INDEX DOCD: CPT | Mod: CPTII,S$GLB,, | Performed by: STUDENT IN AN ORGANIZED HEALTH CARE EDUCATION/TRAINING PROGRAM

## 2021-08-26 PROCEDURE — 1125F AMNT PAIN NOTED PAIN PRSNT: CPT | Mod: CPTII,S$GLB,, | Performed by: STUDENT IN AN ORGANIZED HEALTH CARE EDUCATION/TRAINING PROGRAM

## 2021-08-26 PROCEDURE — 99215 PR OFFICE/OUTPT VISIT, EST, LEVL V, 40-54 MIN: ICD-10-PCS | Mod: S$GLB,,, | Performed by: STUDENT IN AN ORGANIZED HEALTH CARE EDUCATION/TRAINING PROGRAM

## 2021-08-26 PROCEDURE — 3078F PR MOST RECENT DIASTOLIC BLOOD PRESSURE < 80 MM HG: ICD-10-PCS | Mod: CPTII,S$GLB,, | Performed by: STUDENT IN AN ORGANIZED HEALTH CARE EDUCATION/TRAINING PROGRAM

## 2021-08-26 PROCEDURE — 3074F PR MOST RECENT SYSTOLIC BLOOD PRESSURE < 130 MM HG: ICD-10-PCS | Mod: CPTII,S$GLB,, | Performed by: STUDENT IN AN ORGANIZED HEALTH CARE EDUCATION/TRAINING PROGRAM

## 2021-08-26 PROCEDURE — 99999 PR PBB SHADOW E&M-EST. PATIENT-LVL V: ICD-10-PCS | Mod: PBBFAC,,, | Performed by: STUDENT IN AN ORGANIZED HEALTH CARE EDUCATION/TRAINING PROGRAM

## 2021-08-26 PROCEDURE — 1101F PR PT FALLS ASSESS DOC 0-1 FALLS W/OUT INJ PAST YR: ICD-10-PCS | Mod: CPTII,S$GLB,, | Performed by: STUDENT IN AN ORGANIZED HEALTH CARE EDUCATION/TRAINING PROGRAM

## 2021-08-26 PROCEDURE — 99999 PR PBB SHADOW E&M-EST. PATIENT-LVL V: CPT | Mod: PBBFAC,,, | Performed by: STUDENT IN AN ORGANIZED HEALTH CARE EDUCATION/TRAINING PROGRAM

## 2021-08-26 PROCEDURE — 3288F PR FALLS RISK ASSESSMENT DOCUMENTED: ICD-10-PCS | Mod: CPTII,S$GLB,, | Performed by: STUDENT IN AN ORGANIZED HEALTH CARE EDUCATION/TRAINING PROGRAM

## 2021-08-26 PROCEDURE — 3008F PR BODY MASS INDEX (BMI) DOCUMENTED: ICD-10-PCS | Mod: CPTII,S$GLB,, | Performed by: STUDENT IN AN ORGANIZED HEALTH CARE EDUCATION/TRAINING PROGRAM

## 2021-08-26 PROCEDURE — 3044F HG A1C LEVEL LT 7.0%: CPT | Mod: CPTII,S$GLB,, | Performed by: STUDENT IN AN ORGANIZED HEALTH CARE EDUCATION/TRAINING PROGRAM

## 2021-08-26 PROCEDURE — 85027 COMPLETE CBC AUTOMATED: CPT | Performed by: STUDENT IN AN ORGANIZED HEALTH CARE EDUCATION/TRAINING PROGRAM

## 2021-08-26 PROCEDURE — 3288F FALL RISK ASSESSMENT DOCD: CPT | Mod: CPTII,S$GLB,, | Performed by: STUDENT IN AN ORGANIZED HEALTH CARE EDUCATION/TRAINING PROGRAM

## 2021-08-26 PROCEDURE — 3044F PR MOST RECENT HEMOGLOBIN A1C LEVEL <7.0%: ICD-10-PCS | Mod: CPTII,S$GLB,, | Performed by: STUDENT IN AN ORGANIZED HEALTH CARE EDUCATION/TRAINING PROGRAM

## 2021-08-26 PROCEDURE — 99499 RISK ADDL DX/OHS AUDIT: ICD-10-PCS | Mod: HCNC,S$GLB,, | Performed by: STUDENT IN AN ORGANIZED HEALTH CARE EDUCATION/TRAINING PROGRAM

## 2021-08-26 PROCEDURE — 1101F PT FALLS ASSESS-DOCD LE1/YR: CPT | Mod: CPTII,S$GLB,, | Performed by: STUDENT IN AN ORGANIZED HEALTH CARE EDUCATION/TRAINING PROGRAM

## 2021-08-26 PROCEDURE — 80053 COMPREHEN METABOLIC PANEL: CPT | Performed by: STUDENT IN AN ORGANIZED HEALTH CARE EDUCATION/TRAINING PROGRAM

## 2021-08-26 PROCEDURE — 85007 BL SMEAR W/DIFF WBC COUNT: CPT | Performed by: STUDENT IN AN ORGANIZED HEALTH CARE EDUCATION/TRAINING PROGRAM

## 2021-08-26 PROCEDURE — 99215 OFFICE O/P EST HI 40 MIN: CPT | Mod: S$GLB,,, | Performed by: STUDENT IN AN ORGANIZED HEALTH CARE EDUCATION/TRAINING PROGRAM

## 2021-08-26 PROCEDURE — 1159F MED LIST DOCD IN RCRD: CPT | Mod: CPTII,S$GLB,, | Performed by: STUDENT IN AN ORGANIZED HEALTH CARE EDUCATION/TRAINING PROGRAM

## 2021-08-26 PROCEDURE — 1125F PR PAIN SEVERITY QUANTIFIED, PAIN PRESENT: ICD-10-PCS | Mod: CPTII,S$GLB,, | Performed by: STUDENT IN AN ORGANIZED HEALTH CARE EDUCATION/TRAINING PROGRAM

## 2021-08-26 PROCEDURE — 3078F DIAST BP <80 MM HG: CPT | Mod: CPTII,S$GLB,, | Performed by: STUDENT IN AN ORGANIZED HEALTH CARE EDUCATION/TRAINING PROGRAM

## 2021-08-26 PROCEDURE — 3074F SYST BP LT 130 MM HG: CPT | Mod: CPTII,S$GLB,, | Performed by: STUDENT IN AN ORGANIZED HEALTH CARE EDUCATION/TRAINING PROGRAM

## 2021-08-26 RX ORDER — DOXYCYCLINE HYCLATE 100 MG
100 TABLET ORAL 2 TIMES DAILY
Qty: 14 TABLET | Refills: 0 | Status: SHIPPED | OUTPATIENT
Start: 2021-08-26 | End: 2021-09-02

## 2021-08-27 ENCOUNTER — TELEPHONE (OUTPATIENT)
Dept: PAIN MEDICINE | Facility: CLINIC | Age: 71
End: 2021-08-27

## 2021-08-27 DIAGNOSIS — F43.20 ADJUSTMENT DISORDER, UNSPECIFIED TYPE: ICD-10-CM

## 2021-08-27 RX ORDER — CITALOPRAM 10 MG/1
10 TABLET ORAL DAILY
Qty: 30 TABLET | Refills: 0 | Status: SHIPPED | OUTPATIENT
Start: 2021-08-27 | End: 2021-09-20

## 2021-08-30 ENCOUNTER — TELEPHONE (OUTPATIENT)
Dept: SURGERY | Facility: CLINIC | Age: 71
End: 2021-08-30

## 2021-09-03 ENCOUNTER — TELEPHONE (OUTPATIENT)
Dept: SURGERY | Facility: CLINIC | Age: 71
End: 2021-09-03

## 2021-09-08 ENCOUNTER — TELEPHONE (OUTPATIENT)
Dept: HEMATOLOGY/ONCOLOGY | Facility: CLINIC | Age: 71
End: 2021-09-08

## 2021-09-09 ENCOUNTER — TELEPHONE (OUTPATIENT)
Dept: PAIN MEDICINE | Facility: CLINIC | Age: 71
End: 2021-09-09

## 2021-09-10 ENCOUNTER — TELEPHONE (OUTPATIENT)
Dept: PAIN MEDICINE | Facility: CLINIC | Age: 71
End: 2021-09-10

## 2021-09-13 ENCOUNTER — HOSPITAL ENCOUNTER (OUTPATIENT)
Dept: RADIOLOGY | Facility: OTHER | Age: 71
Discharge: HOME | End: 2021-09-13
Attending: STUDENT IN AN ORGANIZED HEALTH CARE EDUCATION/TRAINING PROGRAM
Payer: MEDICARE

## 2021-09-13 ENCOUNTER — TELEPHONE (OUTPATIENT)
Dept: PAIN MEDICINE | Facility: CLINIC | Age: 71
End: 2021-09-13

## 2021-09-13 ENCOUNTER — OFFICE VISIT (OUTPATIENT)
Dept: SURGERY | Facility: CLINIC | Age: 71
End: 2021-09-13
Payer: MEDICARE

## 2021-09-13 VITALS
HEIGHT: 66 IN | WEIGHT: 293 LBS | BODY MASS INDEX: 47.09 KG/M2 | HEART RATE: 77 BPM | DIASTOLIC BLOOD PRESSURE: 58 MMHG | SYSTOLIC BLOOD PRESSURE: 127 MMHG

## 2021-09-13 DIAGNOSIS — R92.8 ABNORMAL MAMMOGRAM: ICD-10-CM

## 2021-09-13 DIAGNOSIS — N61.1 ABSCESS OF NIPPLE, RIGHT: Primary | ICD-10-CM

## 2021-09-13 PROCEDURE — 99999 PR PBB SHADOW E&M-EST. PATIENT-LVL IV: ICD-10-PCS | Mod: PBBFAC,,, | Performed by: NURSE PRACTITIONER

## 2021-09-13 PROCEDURE — 3078F PR MOST RECENT DIASTOLIC BLOOD PRESSURE < 80 MM HG: ICD-10-PCS | Mod: CPTII,S$GLB,, | Performed by: NURSE PRACTITIONER

## 2021-09-13 PROCEDURE — 1159F PR MEDICATION LIST DOCUMENTED IN MEDICAL RECORD: ICD-10-PCS | Mod: CPTII,S$GLB,, | Performed by: NURSE PRACTITIONER

## 2021-09-13 PROCEDURE — 99999 PR PBB SHADOW E&M-EST. PATIENT-LVL IV: CPT | Mod: PBBFAC,,, | Performed by: NURSE PRACTITIONER

## 2021-09-13 PROCEDURE — 4010F PR ACE/ARB THEARPY RXD/TAKEN: ICD-10-PCS | Mod: CPTII,S$GLB,, | Performed by: NURSE PRACTITIONER

## 2021-09-13 PROCEDURE — 3074F SYST BP LT 130 MM HG: CPT | Mod: CPTII,S$GLB,, | Performed by: NURSE PRACTITIONER

## 2021-09-13 PROCEDURE — 76642 ULTRASOUND BREAST LIMITED: CPT | Mod: 26,RT,, | Performed by: RADIOLOGY

## 2021-09-13 PROCEDURE — 4010F ACE/ARB THERAPY RXD/TAKEN: CPT | Mod: CPTII,S$GLB,, | Performed by: NURSE PRACTITIONER

## 2021-09-13 PROCEDURE — 1160F RVW MEDS BY RX/DR IN RCRD: CPT | Mod: CPTII,S$GLB,, | Performed by: NURSE PRACTITIONER

## 2021-09-13 PROCEDURE — 76642 ULTRASOUND BREAST LIMITED: CPT | Mod: TC,RT

## 2021-09-13 PROCEDURE — 3074F PR MOST RECENT SYSTOLIC BLOOD PRESSURE < 130 MM HG: ICD-10-PCS | Mod: CPTII,S$GLB,, | Performed by: NURSE PRACTITIONER

## 2021-09-13 PROCEDURE — 1159F MED LIST DOCD IN RCRD: CPT | Mod: CPTII,S$GLB,, | Performed by: NURSE PRACTITIONER

## 2021-09-13 PROCEDURE — 1125F PR PAIN SEVERITY QUANTIFIED, PAIN PRESENT: ICD-10-PCS | Mod: CPTII,S$GLB,, | Performed by: NURSE PRACTITIONER

## 2021-09-13 PROCEDURE — 3288F FALL RISK ASSESSMENT DOCD: CPT | Mod: CPTII,S$GLB,, | Performed by: NURSE PRACTITIONER

## 2021-09-13 PROCEDURE — 3078F DIAST BP <80 MM HG: CPT | Mod: CPTII,S$GLB,, | Performed by: NURSE PRACTITIONER

## 2021-09-13 PROCEDURE — 99214 PR OFFICE/OUTPT VISIT, EST, LEVL IV, 30-39 MIN: ICD-10-PCS | Mod: S$GLB,,, | Performed by: NURSE PRACTITIONER

## 2021-09-13 PROCEDURE — 76642 US BREAST RIGHT LIMITED: ICD-10-PCS | Mod: 26,RT,, | Performed by: RADIOLOGY

## 2021-09-13 PROCEDURE — 3044F PR MOST RECENT HEMOGLOBIN A1C LEVEL <7.0%: ICD-10-PCS | Mod: CPTII,S$GLB,, | Performed by: NURSE PRACTITIONER

## 2021-09-13 PROCEDURE — 1160F PR REVIEW ALL MEDS BY PRESCRIBER/CLIN PHARMACIST DOCUMENTED: ICD-10-PCS | Mod: CPTII,S$GLB,, | Performed by: NURSE PRACTITIONER

## 2021-09-13 PROCEDURE — 1101F PR PT FALLS ASSESS DOC 0-1 FALLS W/OUT INJ PAST YR: ICD-10-PCS | Mod: CPTII,S$GLB,, | Performed by: NURSE PRACTITIONER

## 2021-09-13 PROCEDURE — 3288F PR FALLS RISK ASSESSMENT DOCUMENTED: ICD-10-PCS | Mod: CPTII,S$GLB,, | Performed by: NURSE PRACTITIONER

## 2021-09-13 PROCEDURE — 3044F HG A1C LEVEL LT 7.0%: CPT | Mod: CPTII,S$GLB,, | Performed by: NURSE PRACTITIONER

## 2021-09-13 PROCEDURE — 99214 OFFICE O/P EST MOD 30 MIN: CPT | Mod: S$GLB,,, | Performed by: NURSE PRACTITIONER

## 2021-09-13 PROCEDURE — 1125F AMNT PAIN NOTED PAIN PRSNT: CPT | Mod: CPTII,S$GLB,, | Performed by: NURSE PRACTITIONER

## 2021-09-13 PROCEDURE — 3008F PR BODY MASS INDEX (BMI) DOCUMENTED: ICD-10-PCS | Mod: CPTII,S$GLB,, | Performed by: NURSE PRACTITIONER

## 2021-09-13 PROCEDURE — 3008F BODY MASS INDEX DOCD: CPT | Mod: CPTII,S$GLB,, | Performed by: NURSE PRACTITIONER

## 2021-09-13 PROCEDURE — 1101F PT FALLS ASSESS-DOCD LE1/YR: CPT | Mod: CPTII,S$GLB,, | Performed by: NURSE PRACTITIONER

## 2021-09-13 RX ORDER — SULFAMETHOXAZOLE AND TRIMETHOPRIM 800; 160 MG/1; MG/1
1 TABLET ORAL 2 TIMES DAILY
Qty: 14 TABLET | Refills: 0 | Status: SHIPPED | OUTPATIENT
Start: 2021-09-13 | End: 2021-09-20

## 2021-09-13 RX ORDER — LIDOCAINE HYDROCHLORIDE AND EPINEPHRINE 20; 10 MG/ML; UG/ML
10 INJECTION, SOLUTION INFILTRATION; PERINEURAL ONCE
Status: DISCONTINUED | OUTPATIENT
Start: 2021-09-13 | End: 2021-09-13

## 2021-09-13 RX ORDER — LIDOCAINE HYDROCHLORIDE 10 MG/ML
5 INJECTION INFILTRATION; PERINEURAL ONCE
Status: DISCONTINUED | OUTPATIENT
Start: 2021-09-13 | End: 2021-09-13

## 2021-09-14 ENCOUNTER — OFFICE VISIT (OUTPATIENT)
Dept: PAIN MEDICINE | Facility: CLINIC | Age: 71
End: 2021-09-14
Payer: MEDICARE

## 2021-09-14 DIAGNOSIS — G89.29 CHRONIC PAIN OF LEFT KNEE: ICD-10-CM

## 2021-09-14 DIAGNOSIS — G89.4 CHRONIC PAIN DISORDER: ICD-10-CM

## 2021-09-14 DIAGNOSIS — M25.562 CHRONIC PAIN OF LEFT KNEE: ICD-10-CM

## 2021-09-14 DIAGNOSIS — G89.4 CHRONIC PAIN DISORDER: Primary | ICD-10-CM

## 2021-09-14 DIAGNOSIS — M17.12 PRIMARY OSTEOARTHRITIS OF LEFT KNEE: ICD-10-CM

## 2021-09-14 DIAGNOSIS — C16.9 GASTRIC ADENOCARCINOMA: ICD-10-CM

## 2021-09-14 DIAGNOSIS — G89.29 OTHER CHRONIC PAIN: ICD-10-CM

## 2021-09-14 PROCEDURE — 1160F RVW MEDS BY RX/DR IN RCRD: CPT | Mod: CPTII,95,, | Performed by: NURSE PRACTITIONER

## 2021-09-14 PROCEDURE — 1159F MED LIST DOCD IN RCRD: CPT | Mod: CPTII,95,, | Performed by: NURSE PRACTITIONER

## 2021-09-14 PROCEDURE — 4010F ACE/ARB THERAPY RXD/TAKEN: CPT | Mod: CPTII,95,, | Performed by: NURSE PRACTITIONER

## 2021-09-14 PROCEDURE — 1160F PR REVIEW ALL MEDS BY PRESCRIBER/CLIN PHARMACIST DOCUMENTED: ICD-10-PCS | Mod: CPTII,95,, | Performed by: NURSE PRACTITIONER

## 2021-09-14 PROCEDURE — 3044F HG A1C LEVEL LT 7.0%: CPT | Mod: CPTII,95,, | Performed by: NURSE PRACTITIONER

## 2021-09-14 PROCEDURE — 99442 PR PHYSICIAN TELEPHONE EVALUATION 11-20 MIN: CPT | Mod: 95,,, | Performed by: NURSE PRACTITIONER

## 2021-09-14 PROCEDURE — 3044F PR MOST RECENT HEMOGLOBIN A1C LEVEL <7.0%: ICD-10-PCS | Mod: CPTII,95,, | Performed by: NURSE PRACTITIONER

## 2021-09-14 PROCEDURE — 4010F PR ACE/ARB THEARPY RXD/TAKEN: ICD-10-PCS | Mod: CPTII,95,, | Performed by: NURSE PRACTITIONER

## 2021-09-14 PROCEDURE — 1159F PR MEDICATION LIST DOCUMENTED IN MEDICAL RECORD: ICD-10-PCS | Mod: CPTII,95,, | Performed by: NURSE PRACTITIONER

## 2021-09-14 PROCEDURE — 99442 PR PHYSICIAN TELEPHONE EVALUATION 11-20 MIN: ICD-10-PCS | Mod: 95,,, | Performed by: NURSE PRACTITIONER

## 2021-09-14 RX ORDER — OXYCODONE AND ACETAMINOPHEN 5; 325 MG/1; MG/1
1 TABLET ORAL EVERY 8 HOURS PRN
Qty: 90 TABLET | Refills: 0 | Status: SHIPPED | OUTPATIENT
Start: 2021-09-22 | End: 2021-10-22 | Stop reason: SDUPTHER

## 2021-09-15 ENCOUNTER — OFFICE VISIT (OUTPATIENT)
Dept: HEMATOLOGY/ONCOLOGY | Facility: CLINIC | Age: 71
End: 2021-09-15
Payer: MEDICARE

## 2021-09-15 ENCOUNTER — INFUSION (OUTPATIENT)
Dept: INFUSION THERAPY | Facility: OTHER | Age: 71
End: 2021-09-15
Attending: STUDENT IN AN ORGANIZED HEALTH CARE EDUCATION/TRAINING PROGRAM
Payer: MEDICARE

## 2021-09-15 VITALS
WEIGHT: 293 LBS | RESPIRATION RATE: 17 BRPM | HEART RATE: 77 BPM | HEIGHT: 66 IN | DIASTOLIC BLOOD PRESSURE: 55 MMHG | OXYGEN SATURATION: 97 % | BODY MASS INDEX: 47.09 KG/M2 | SYSTOLIC BLOOD PRESSURE: 117 MMHG | TEMPERATURE: 98 F

## 2021-09-15 DIAGNOSIS — D63.0 ANEMIA COMPLICATING NEOPLASTIC DISEASE: ICD-10-CM

## 2021-09-15 DIAGNOSIS — E66.01 MORBID OBESITY: ICD-10-CM

## 2021-09-15 DIAGNOSIS — G89.29 CHRONIC PAIN OF LEFT KNEE: ICD-10-CM

## 2021-09-15 DIAGNOSIS — M25.562 CHRONIC PAIN OF LEFT KNEE: ICD-10-CM

## 2021-09-15 DIAGNOSIS — D70.1 CHEMOTHERAPY INDUCED NEUTROPENIA: ICD-10-CM

## 2021-09-15 DIAGNOSIS — C16.9 GASTRIC ADENOCARCINOMA: Primary | ICD-10-CM

## 2021-09-15 DIAGNOSIS — I10 ESSENTIAL HYPERTENSION: ICD-10-CM

## 2021-09-15 DIAGNOSIS — N61.1 BREAST ABSCESS: ICD-10-CM

## 2021-09-15 DIAGNOSIS — T45.1X5A CHEMOTHERAPY INDUCED NEUTROPENIA: ICD-10-CM

## 2021-09-15 DIAGNOSIS — R60.0 BILATERAL LEG EDEMA: ICD-10-CM

## 2021-09-15 DIAGNOSIS — E87.6 HYPOKALEMIA: ICD-10-CM

## 2021-09-15 PROCEDURE — 96416 CHEMO PROLONG INFUSE W/PUMP: CPT

## 2021-09-15 PROCEDURE — 96415 CHEMO IV INFUSION ADDL HR: CPT

## 2021-09-15 PROCEDURE — 1159F PR MEDICATION LIST DOCUMENTED IN MEDICAL RECORD: ICD-10-PCS | Mod: CPTII,S$GLB,, | Performed by: STUDENT IN AN ORGANIZED HEALTH CARE EDUCATION/TRAINING PROGRAM

## 2021-09-15 PROCEDURE — 96367 TX/PROPH/DG ADDL SEQ IV INF: CPT

## 2021-09-15 PROCEDURE — 99499 RISK ADDL DX/OHS AUDIT: ICD-10-PCS | Mod: HCNC,S$GLB,, | Performed by: STUDENT IN AN ORGANIZED HEALTH CARE EDUCATION/TRAINING PROGRAM

## 2021-09-15 PROCEDURE — 99215 OFFICE O/P EST HI 40 MIN: CPT | Mod: S$GLB,,, | Performed by: STUDENT IN AN ORGANIZED HEALTH CARE EDUCATION/TRAINING PROGRAM

## 2021-09-15 PROCEDURE — 96368 THER/DIAG CONCURRENT INF: CPT

## 2021-09-15 PROCEDURE — 99215 PR OFFICE/OUTPT VISIT, EST, LEVL V, 40-54 MIN: ICD-10-PCS | Mod: S$GLB,,, | Performed by: STUDENT IN AN ORGANIZED HEALTH CARE EDUCATION/TRAINING PROGRAM

## 2021-09-15 PROCEDURE — 4010F ACE/ARB THERAPY RXD/TAKEN: CPT | Mod: CPTII,S$GLB,, | Performed by: STUDENT IN AN ORGANIZED HEALTH CARE EDUCATION/TRAINING PROGRAM

## 2021-09-15 PROCEDURE — 3008F BODY MASS INDEX DOCD: CPT | Mod: CPTII,S$GLB,, | Performed by: STUDENT IN AN ORGANIZED HEALTH CARE EDUCATION/TRAINING PROGRAM

## 2021-09-15 PROCEDURE — 63600175 PHARM REV CODE 636 W HCPCS: Performed by: STUDENT IN AN ORGANIZED HEALTH CARE EDUCATION/TRAINING PROGRAM

## 2021-09-15 PROCEDURE — 3288F FALL RISK ASSESSMENT DOCD: CPT | Mod: CPTII,S$GLB,, | Performed by: STUDENT IN AN ORGANIZED HEALTH CARE EDUCATION/TRAINING PROGRAM

## 2021-09-15 PROCEDURE — 3008F PR BODY MASS INDEX (BMI) DOCUMENTED: ICD-10-PCS | Mod: CPTII,S$GLB,, | Performed by: STUDENT IN AN ORGANIZED HEALTH CARE EDUCATION/TRAINING PROGRAM

## 2021-09-15 PROCEDURE — 4010F PR ACE/ARB THEARPY RXD/TAKEN: ICD-10-PCS | Mod: CPTII,S$GLB,, | Performed by: STUDENT IN AN ORGANIZED HEALTH CARE EDUCATION/TRAINING PROGRAM

## 2021-09-15 PROCEDURE — 3078F PR MOST RECENT DIASTOLIC BLOOD PRESSURE < 80 MM HG: ICD-10-PCS | Mod: CPTII,S$GLB,, | Performed by: STUDENT IN AN ORGANIZED HEALTH CARE EDUCATION/TRAINING PROGRAM

## 2021-09-15 PROCEDURE — 1126F PR PAIN SEVERITY QUANTIFIED, NO PAIN PRESENT: ICD-10-PCS | Mod: CPTII,S$GLB,, | Performed by: STUDENT IN AN ORGANIZED HEALTH CARE EDUCATION/TRAINING PROGRAM

## 2021-09-15 PROCEDURE — 3288F PR FALLS RISK ASSESSMENT DOCUMENTED: ICD-10-PCS | Mod: CPTII,S$GLB,, | Performed by: STUDENT IN AN ORGANIZED HEALTH CARE EDUCATION/TRAINING PROGRAM

## 2021-09-15 PROCEDURE — 1126F AMNT PAIN NOTED NONE PRSNT: CPT | Mod: CPTII,S$GLB,, | Performed by: STUDENT IN AN ORGANIZED HEALTH CARE EDUCATION/TRAINING PROGRAM

## 2021-09-15 PROCEDURE — 96417 CHEMO IV INFUS EACH ADDL SEQ: CPT

## 2021-09-15 PROCEDURE — 1101F PT FALLS ASSESS-DOCD LE1/YR: CPT | Mod: CPTII,S$GLB,, | Performed by: STUDENT IN AN ORGANIZED HEALTH CARE EDUCATION/TRAINING PROGRAM

## 2021-09-15 PROCEDURE — 25000003 PHARM REV CODE 250: Performed by: STUDENT IN AN ORGANIZED HEALTH CARE EDUCATION/TRAINING PROGRAM

## 2021-09-15 PROCEDURE — 3044F PR MOST RECENT HEMOGLOBIN A1C LEVEL <7.0%: ICD-10-PCS | Mod: CPTII,S$GLB,, | Performed by: STUDENT IN AN ORGANIZED HEALTH CARE EDUCATION/TRAINING PROGRAM

## 2021-09-15 PROCEDURE — 1101F PR PT FALLS ASSESS DOC 0-1 FALLS W/OUT INJ PAST YR: ICD-10-PCS | Mod: CPTII,S$GLB,, | Performed by: STUDENT IN AN ORGANIZED HEALTH CARE EDUCATION/TRAINING PROGRAM

## 2021-09-15 PROCEDURE — 99499 UNLISTED E&M SERVICE: CPT | Mod: HCNC,S$GLB,, | Performed by: STUDENT IN AN ORGANIZED HEALTH CARE EDUCATION/TRAINING PROGRAM

## 2021-09-15 PROCEDURE — 1159F MED LIST DOCD IN RCRD: CPT | Mod: CPTII,S$GLB,, | Performed by: STUDENT IN AN ORGANIZED HEALTH CARE EDUCATION/TRAINING PROGRAM

## 2021-09-15 PROCEDURE — 3074F PR MOST RECENT SYSTOLIC BLOOD PRESSURE < 130 MM HG: ICD-10-PCS | Mod: CPTII,S$GLB,, | Performed by: STUDENT IN AN ORGANIZED HEALTH CARE EDUCATION/TRAINING PROGRAM

## 2021-09-15 PROCEDURE — 99999 PR PBB SHADOW E&M-EST. PATIENT-LVL V: ICD-10-PCS | Mod: PBBFAC,,, | Performed by: STUDENT IN AN ORGANIZED HEALTH CARE EDUCATION/TRAINING PROGRAM

## 2021-09-15 PROCEDURE — 96413 CHEMO IV INFUSION 1 HR: CPT

## 2021-09-15 PROCEDURE — 3074F SYST BP LT 130 MM HG: CPT | Mod: CPTII,S$GLB,, | Performed by: STUDENT IN AN ORGANIZED HEALTH CARE EDUCATION/TRAINING PROGRAM

## 2021-09-15 PROCEDURE — 3078F DIAST BP <80 MM HG: CPT | Mod: CPTII,S$GLB,, | Performed by: STUDENT IN AN ORGANIZED HEALTH CARE EDUCATION/TRAINING PROGRAM

## 2021-09-15 PROCEDURE — 99999 PR PBB SHADOW E&M-EST. PATIENT-LVL V: CPT | Mod: PBBFAC,,, | Performed by: STUDENT IN AN ORGANIZED HEALTH CARE EDUCATION/TRAINING PROGRAM

## 2021-09-15 PROCEDURE — 3044F HG A1C LEVEL LT 7.0%: CPT | Mod: CPTII,S$GLB,, | Performed by: STUDENT IN AN ORGANIZED HEALTH CARE EDUCATION/TRAINING PROGRAM

## 2021-09-15 RX ORDER — SODIUM CHLORIDE 0.9 % (FLUSH) 0.9 %
10 SYRINGE (ML) INJECTION
Status: CANCELLED | OUTPATIENT
Start: 2021-09-16

## 2021-09-15 RX ORDER — HEPARIN 100 UNIT/ML
500 SYRINGE INTRAVENOUS
Status: DISCONTINUED | OUTPATIENT
Start: 2021-09-15 | End: 2021-09-15 | Stop reason: HOSPADM

## 2021-09-15 RX ORDER — EPINEPHRINE 0.3 MG/.3ML
0.3 INJECTION SUBCUTANEOUS ONCE AS NEEDED
Status: DISCONTINUED | OUTPATIENT
Start: 2021-09-15 | End: 2021-09-15 | Stop reason: HOSPADM

## 2021-09-15 RX ORDER — EPINEPHRINE 0.3 MG/.3ML
0.3 INJECTION SUBCUTANEOUS ONCE AS NEEDED
Status: CANCELLED | OUTPATIENT
Start: 2021-09-15

## 2021-09-15 RX ORDER — HEPARIN 100 UNIT/ML
500 SYRINGE INTRAVENOUS
Status: CANCELLED | OUTPATIENT
Start: 2021-09-16

## 2021-09-15 RX ORDER — DIPHENHYDRAMINE HYDROCHLORIDE 50 MG/ML
50 INJECTION INTRAMUSCULAR; INTRAVENOUS ONCE AS NEEDED
Status: CANCELLED | OUTPATIENT
Start: 2021-09-15

## 2021-09-15 RX ORDER — DIPHENHYDRAMINE HYDROCHLORIDE 50 MG/ML
50 INJECTION INTRAMUSCULAR; INTRAVENOUS ONCE AS NEEDED
Status: DISCONTINUED | OUTPATIENT
Start: 2021-09-15 | End: 2021-09-15 | Stop reason: HOSPADM

## 2021-09-15 RX ORDER — SODIUM CHLORIDE 0.9 % (FLUSH) 0.9 %
10 SYRINGE (ML) INJECTION
Status: CANCELLED | OUTPATIENT
Start: 2021-09-15

## 2021-09-15 RX ORDER — HEPARIN 100 UNIT/ML
500 SYRINGE INTRAVENOUS
Status: CANCELLED | OUTPATIENT
Start: 2021-09-15

## 2021-09-15 RX ORDER — SODIUM CHLORIDE 0.9 % (FLUSH) 0.9 %
10 SYRINGE (ML) INJECTION
Status: DISCONTINUED | OUTPATIENT
Start: 2021-09-15 | End: 2021-09-15 | Stop reason: HOSPADM

## 2021-09-15 RX ADMIN — ALTEPLASE 2 MG: 2.2 INJECTION, POWDER, LYOPHILIZED, FOR SOLUTION INTRAVENOUS at 10:09

## 2021-09-15 RX ADMIN — PALONOSETRON HYDROCHLORIDE 0.25 MG: 0.25 INJECTION, SOLUTION INTRAVENOUS at 11:09

## 2021-09-15 RX ADMIN — LEUCOVORIN CALCIUM 530 MG: 350 INJECTION, POWDER, LYOPHILIZED, FOR SOLUTION INTRAMUSCULAR; INTRAVENOUS at 12:09

## 2021-09-15 RX ADMIN — DOCETAXEL 105 MG: 10 INJECTION, SOLUTION INTRAVENOUS at 11:09

## 2021-09-15 RX ADMIN — DEXTROSE: 5 SOLUTION INTRAVENOUS at 12:09

## 2021-09-15 RX ADMIN — SODIUM CHLORIDE: 0.9 INJECTION, SOLUTION INTRAVENOUS at 11:09

## 2021-09-15 RX ADMIN — OXALIPLATIN 225 MG: 5 INJECTION, SOLUTION INTRAVENOUS at 12:09

## 2021-09-15 RX ADMIN — FLUOROURACIL 5300 MG: 50 INJECTION, SOLUTION INTRAVENOUS at 03:09

## 2021-09-16 ENCOUNTER — HOSPITAL ENCOUNTER (OUTPATIENT)
Dept: RADIOLOGY | Facility: OTHER | Age: 71
Discharge: HOME | End: 2021-09-16
Attending: STUDENT IN AN ORGANIZED HEALTH CARE EDUCATION/TRAINING PROGRAM
Payer: MEDICARE

## 2021-09-16 ENCOUNTER — INFUSION (OUTPATIENT)
Dept: INFUSION THERAPY | Facility: OTHER | Age: 71
End: 2021-09-16
Attending: STUDENT IN AN ORGANIZED HEALTH CARE EDUCATION/TRAINING PROGRAM
Payer: MEDICARE

## 2021-09-16 ENCOUNTER — OFFICE VISIT (OUTPATIENT)
Dept: HEMATOLOGY/ONCOLOGY | Facility: CLINIC | Age: 71
End: 2021-09-16
Payer: MEDICARE

## 2021-09-16 DIAGNOSIS — C16.9 GASTRIC ADENOCARCINOMA: Primary | ICD-10-CM

## 2021-09-16 DIAGNOSIS — C16.9 GASTRIC ADENOCARCINOMA: ICD-10-CM

## 2021-09-16 DIAGNOSIS — N61.1 BREAST ABSCESS: ICD-10-CM

## 2021-09-16 DIAGNOSIS — R60.0 BILATERAL LEG EDEMA: ICD-10-CM

## 2021-09-16 DIAGNOSIS — I82.411 DVT OF DEEP FEMORAL VEIN, RIGHT: Primary | ICD-10-CM

## 2021-09-16 PROCEDURE — 93970 US LOWER EXTREMITY VEINS BILATERAL: ICD-10-PCS | Mod: 26,,, | Performed by: RADIOLOGY

## 2021-09-16 PROCEDURE — 1159F PR MEDICATION LIST DOCUMENTED IN MEDICAL RECORD: ICD-10-PCS | Mod: CPTII,S$GLB,, | Performed by: STUDENT IN AN ORGANIZED HEALTH CARE EDUCATION/TRAINING PROGRAM

## 2021-09-16 PROCEDURE — 1159F MED LIST DOCD IN RCRD: CPT | Mod: CPTII,S$GLB,, | Performed by: STUDENT IN AN ORGANIZED HEALTH CARE EDUCATION/TRAINING PROGRAM

## 2021-09-16 PROCEDURE — 93970 EXTREMITY STUDY: CPT | Mod: 26,,, | Performed by: RADIOLOGY

## 2021-09-16 PROCEDURE — 3044F HG A1C LEVEL LT 7.0%: CPT | Mod: CPTII,S$GLB,, | Performed by: STUDENT IN AN ORGANIZED HEALTH CARE EDUCATION/TRAINING PROGRAM

## 2021-09-16 PROCEDURE — 1160F RVW MEDS BY RX/DR IN RCRD: CPT | Mod: CPTII,S$GLB,, | Performed by: STUDENT IN AN ORGANIZED HEALTH CARE EDUCATION/TRAINING PROGRAM

## 2021-09-16 PROCEDURE — 93970 EXTREMITY STUDY: CPT | Mod: TC

## 2021-09-16 PROCEDURE — 99214 OFFICE O/P EST MOD 30 MIN: CPT | Mod: S$GLB,,, | Performed by: STUDENT IN AN ORGANIZED HEALTH CARE EDUCATION/TRAINING PROGRAM

## 2021-09-16 PROCEDURE — 4010F PR ACE/ARB THEARPY RXD/TAKEN: ICD-10-PCS | Mod: CPTII,S$GLB,, | Performed by: STUDENT IN AN ORGANIZED HEALTH CARE EDUCATION/TRAINING PROGRAM

## 2021-09-16 PROCEDURE — A4216 STERILE WATER/SALINE, 10 ML: HCPCS | Performed by: STUDENT IN AN ORGANIZED HEALTH CARE EDUCATION/TRAINING PROGRAM

## 2021-09-16 PROCEDURE — 3044F PR MOST RECENT HEMOGLOBIN A1C LEVEL <7.0%: ICD-10-PCS | Mod: CPTII,S$GLB,, | Performed by: STUDENT IN AN ORGANIZED HEALTH CARE EDUCATION/TRAINING PROGRAM

## 2021-09-16 PROCEDURE — 96521 REFILL/MAINT PORTABLE PUMP: CPT

## 2021-09-16 PROCEDURE — 99999 PR PBB SHADOW E&M-EST. PATIENT-LVL I: CPT | Mod: PBBFAC,,, | Performed by: STUDENT IN AN ORGANIZED HEALTH CARE EDUCATION/TRAINING PROGRAM

## 2021-09-16 PROCEDURE — 63600175 PHARM REV CODE 636 W HCPCS: Performed by: STUDENT IN AN ORGANIZED HEALTH CARE EDUCATION/TRAINING PROGRAM

## 2021-09-16 PROCEDURE — 25000003 PHARM REV CODE 250: Performed by: STUDENT IN AN ORGANIZED HEALTH CARE EDUCATION/TRAINING PROGRAM

## 2021-09-16 PROCEDURE — 99999 PR PBB SHADOW E&M-EST. PATIENT-LVL I: ICD-10-PCS | Mod: PBBFAC,,, | Performed by: STUDENT IN AN ORGANIZED HEALTH CARE EDUCATION/TRAINING PROGRAM

## 2021-09-16 PROCEDURE — 1160F PR REVIEW ALL MEDS BY PRESCRIBER/CLIN PHARMACIST DOCUMENTED: ICD-10-PCS | Mod: CPTII,S$GLB,, | Performed by: STUDENT IN AN ORGANIZED HEALTH CARE EDUCATION/TRAINING PROGRAM

## 2021-09-16 PROCEDURE — 4010F ACE/ARB THERAPY RXD/TAKEN: CPT | Mod: CPTII,S$GLB,, | Performed by: STUDENT IN AN ORGANIZED HEALTH CARE EDUCATION/TRAINING PROGRAM

## 2021-09-16 PROCEDURE — 99214 PR OFFICE/OUTPT VISIT, EST, LEVL IV, 30-39 MIN: ICD-10-PCS | Mod: S$GLB,,, | Performed by: STUDENT IN AN ORGANIZED HEALTH CARE EDUCATION/TRAINING PROGRAM

## 2021-09-16 RX ORDER — HEPARIN 100 UNIT/ML
500 SYRINGE INTRAVENOUS
Status: DISCONTINUED | OUTPATIENT
Start: 2021-09-16 | End: 2021-09-16 | Stop reason: HOSPADM

## 2021-09-16 RX ORDER — SODIUM CHLORIDE 0.9 % (FLUSH) 0.9 %
10 SYRINGE (ML) INJECTION
Status: DISCONTINUED | OUTPATIENT
Start: 2021-09-16 | End: 2021-09-16 | Stop reason: HOSPADM

## 2021-09-16 RX ADMIN — Medication 10 ML: at 03:09

## 2021-09-16 RX ADMIN — HEPARIN 500 UNITS: 100 SYRINGE at 03:09

## 2021-09-20 ENCOUNTER — OFFICE VISIT (OUTPATIENT)
Dept: SURGERY | Facility: CLINIC | Age: 71
End: 2021-09-20
Payer: MEDICARE

## 2021-09-20 VITALS
HEIGHT: 66 IN | SYSTOLIC BLOOD PRESSURE: 69 MMHG | HEART RATE: 83 BPM | BODY MASS INDEX: 47.09 KG/M2 | WEIGHT: 293 LBS | DIASTOLIC BLOOD PRESSURE: 52 MMHG

## 2021-09-20 DIAGNOSIS — N61.1 BREAST ABSCESS: ICD-10-CM

## 2021-09-20 DIAGNOSIS — N61.1 ABSCESS OF NIPPLE, RIGHT: Primary | ICD-10-CM

## 2021-09-20 PROCEDURE — 3078F DIAST BP <80 MM HG: CPT | Mod: CPTII,S$GLB,, | Performed by: NURSE PRACTITIONER

## 2021-09-20 PROCEDURE — 1126F AMNT PAIN NOTED NONE PRSNT: CPT | Mod: CPTII,S$GLB,, | Performed by: NURSE PRACTITIONER

## 2021-09-20 PROCEDURE — 4010F PR ACE/ARB THEARPY RXD/TAKEN: ICD-10-PCS | Mod: CPTII,S$GLB,, | Performed by: NURSE PRACTITIONER

## 2021-09-20 PROCEDURE — 3288F FALL RISK ASSESSMENT DOCD: CPT | Mod: CPTII,S$GLB,, | Performed by: NURSE PRACTITIONER

## 2021-09-20 PROCEDURE — 99213 PR OFFICE/OUTPT VISIT, EST, LEVL III, 20-29 MIN: ICD-10-PCS | Mod: S$GLB,,, | Performed by: NURSE PRACTITIONER

## 2021-09-20 PROCEDURE — 3074F PR MOST RECENT SYSTOLIC BLOOD PRESSURE < 130 MM HG: ICD-10-PCS | Mod: CPTII,S$GLB,, | Performed by: NURSE PRACTITIONER

## 2021-09-20 PROCEDURE — 3008F BODY MASS INDEX DOCD: CPT | Mod: CPTII,S$GLB,, | Performed by: NURSE PRACTITIONER

## 2021-09-20 PROCEDURE — 1101F PR PT FALLS ASSESS DOC 0-1 FALLS W/OUT INJ PAST YR: ICD-10-PCS | Mod: CPTII,S$GLB,, | Performed by: NURSE PRACTITIONER

## 2021-09-20 PROCEDURE — 99999 PR PBB SHADOW E&M-EST. PATIENT-LVL IV: CPT | Mod: PBBFAC,,, | Performed by: NURSE PRACTITIONER

## 2021-09-20 PROCEDURE — 1159F PR MEDICATION LIST DOCUMENTED IN MEDICAL RECORD: ICD-10-PCS | Mod: CPTII,S$GLB,, | Performed by: NURSE PRACTITIONER

## 2021-09-20 PROCEDURE — 1160F RVW MEDS BY RX/DR IN RCRD: CPT | Mod: CPTII,S$GLB,, | Performed by: NURSE PRACTITIONER

## 2021-09-20 PROCEDURE — 3078F PR MOST RECENT DIASTOLIC BLOOD PRESSURE < 80 MM HG: ICD-10-PCS | Mod: CPTII,S$GLB,, | Performed by: NURSE PRACTITIONER

## 2021-09-20 PROCEDURE — 99999 PR PBB SHADOW E&M-EST. PATIENT-LVL IV: ICD-10-PCS | Mod: PBBFAC,,, | Performed by: NURSE PRACTITIONER

## 2021-09-20 PROCEDURE — 3044F HG A1C LEVEL LT 7.0%: CPT | Mod: CPTII,S$GLB,, | Performed by: NURSE PRACTITIONER

## 2021-09-20 PROCEDURE — 1101F PT FALLS ASSESS-DOCD LE1/YR: CPT | Mod: CPTII,S$GLB,, | Performed by: NURSE PRACTITIONER

## 2021-09-20 PROCEDURE — 3074F SYST BP LT 130 MM HG: CPT | Mod: CPTII,S$GLB,, | Performed by: NURSE PRACTITIONER

## 2021-09-20 PROCEDURE — 3288F PR FALLS RISK ASSESSMENT DOCUMENTED: ICD-10-PCS | Mod: CPTII,S$GLB,, | Performed by: NURSE PRACTITIONER

## 2021-09-20 PROCEDURE — 3008F PR BODY MASS INDEX (BMI) DOCUMENTED: ICD-10-PCS | Mod: CPTII,S$GLB,, | Performed by: NURSE PRACTITIONER

## 2021-09-20 PROCEDURE — 1160F PR REVIEW ALL MEDS BY PRESCRIBER/CLIN PHARMACIST DOCUMENTED: ICD-10-PCS | Mod: CPTII,S$GLB,, | Performed by: NURSE PRACTITIONER

## 2021-09-20 PROCEDURE — 4010F ACE/ARB THERAPY RXD/TAKEN: CPT | Mod: CPTII,S$GLB,, | Performed by: NURSE PRACTITIONER

## 2021-09-20 PROCEDURE — 1159F MED LIST DOCD IN RCRD: CPT | Mod: CPTII,S$GLB,, | Performed by: NURSE PRACTITIONER

## 2021-09-20 PROCEDURE — 1126F PR PAIN SEVERITY QUANTIFIED, NO PAIN PRESENT: ICD-10-PCS | Mod: CPTII,S$GLB,, | Performed by: NURSE PRACTITIONER

## 2021-09-20 PROCEDURE — 3044F PR MOST RECENT HEMOGLOBIN A1C LEVEL <7.0%: ICD-10-PCS | Mod: CPTII,S$GLB,, | Performed by: NURSE PRACTITIONER

## 2021-09-20 PROCEDURE — 99213 OFFICE O/P EST LOW 20 MIN: CPT | Mod: S$GLB,,, | Performed by: NURSE PRACTITIONER

## 2021-09-21 ENCOUNTER — TELEPHONE (OUTPATIENT)
Dept: PAIN MEDICINE | Facility: CLINIC | Age: 71
End: 2021-09-21

## 2021-09-23 ENCOUNTER — TELEPHONE (OUTPATIENT)
Dept: PAIN MEDICINE | Facility: CLINIC | Age: 71
End: 2021-09-23

## 2021-09-29 ENCOUNTER — INFUSION (OUTPATIENT)
Dept: INFUSION THERAPY | Facility: OTHER | Age: 71
End: 2021-09-29
Attending: STUDENT IN AN ORGANIZED HEALTH CARE EDUCATION/TRAINING PROGRAM
Payer: MEDICARE

## 2021-09-29 ENCOUNTER — OFFICE VISIT (OUTPATIENT)
Dept: HEMATOLOGY/ONCOLOGY | Facility: CLINIC | Age: 71
End: 2021-09-29
Payer: MEDICARE

## 2021-09-29 VITALS
BODY MASS INDEX: 47.09 KG/M2 | DIASTOLIC BLOOD PRESSURE: 57 MMHG | TEMPERATURE: 98 F | OXYGEN SATURATION: 97 % | WEIGHT: 293 LBS | HEIGHT: 66 IN | RESPIRATION RATE: 18 BRPM | SYSTOLIC BLOOD PRESSURE: 116 MMHG | HEART RATE: 73 BPM

## 2021-09-29 VITALS
SYSTOLIC BLOOD PRESSURE: 142 MMHG | DIASTOLIC BLOOD PRESSURE: 69 MMHG | HEART RATE: 72 BPM | OXYGEN SATURATION: 96 % | RESPIRATION RATE: 20 BRPM

## 2021-09-29 DIAGNOSIS — I82.411 DVT OF DEEP FEMORAL VEIN, RIGHT: ICD-10-CM

## 2021-09-29 DIAGNOSIS — E66.01 MORBID OBESITY: ICD-10-CM

## 2021-09-29 DIAGNOSIS — D70.1 CHEMOTHERAPY INDUCED NEUTROPENIA: ICD-10-CM

## 2021-09-29 DIAGNOSIS — C16.9 GASTRIC ADENOCARCINOMA: Primary | ICD-10-CM

## 2021-09-29 DIAGNOSIS — I10 ESSENTIAL HYPERTENSION: ICD-10-CM

## 2021-09-29 DIAGNOSIS — T45.1X5A CHEMOTHERAPY INDUCED NEUTROPENIA: ICD-10-CM

## 2021-09-29 DIAGNOSIS — D63.0 ANEMIA COMPLICATING NEOPLASTIC DISEASE: ICD-10-CM

## 2021-09-29 DIAGNOSIS — M17.12 PRIMARY OSTEOARTHRITIS OF LEFT KNEE: ICD-10-CM

## 2021-09-29 PROCEDURE — 3288F PR FALLS RISK ASSESSMENT DOCUMENTED: ICD-10-PCS | Mod: HCNC,CPTII,S$GLB, | Performed by: STUDENT IN AN ORGANIZED HEALTH CARE EDUCATION/TRAINING PROGRAM

## 2021-09-29 PROCEDURE — 99999 PR PBB SHADOW E&M-EST. PATIENT-LVL IV: CPT | Mod: PBBFAC,HCNC,, | Performed by: STUDENT IN AN ORGANIZED HEALTH CARE EDUCATION/TRAINING PROGRAM

## 2021-09-29 PROCEDURE — 99215 OFFICE O/P EST HI 40 MIN: CPT | Mod: HCNC,S$GLB,, | Performed by: STUDENT IN AN ORGANIZED HEALTH CARE EDUCATION/TRAINING PROGRAM

## 2021-09-29 PROCEDURE — 3074F PR MOST RECENT SYSTOLIC BLOOD PRESSURE < 130 MM HG: ICD-10-PCS | Mod: HCNC,CPTII,S$GLB, | Performed by: STUDENT IN AN ORGANIZED HEALTH CARE EDUCATION/TRAINING PROGRAM

## 2021-09-29 PROCEDURE — 1126F PR PAIN SEVERITY QUANTIFIED, NO PAIN PRESENT: ICD-10-PCS | Mod: HCNC,CPTII,S$GLB, | Performed by: STUDENT IN AN ORGANIZED HEALTH CARE EDUCATION/TRAINING PROGRAM

## 2021-09-29 PROCEDURE — 3044F HG A1C LEVEL LT 7.0%: CPT | Mod: HCNC,CPTII,S$GLB, | Performed by: STUDENT IN AN ORGANIZED HEALTH CARE EDUCATION/TRAINING PROGRAM

## 2021-09-29 PROCEDURE — 3078F DIAST BP <80 MM HG: CPT | Mod: HCNC,CPTII,S$GLB, | Performed by: STUDENT IN AN ORGANIZED HEALTH CARE EDUCATION/TRAINING PROGRAM

## 2021-09-29 PROCEDURE — 1101F PR PT FALLS ASSESS DOC 0-1 FALLS W/OUT INJ PAST YR: ICD-10-PCS | Mod: HCNC,CPTII,S$GLB, | Performed by: STUDENT IN AN ORGANIZED HEALTH CARE EDUCATION/TRAINING PROGRAM

## 2021-09-29 PROCEDURE — 96416 CHEMO PROLONG INFUSE W/PUMP: CPT | Mod: HCNC

## 2021-09-29 PROCEDURE — 3074F SYST BP LT 130 MM HG: CPT | Mod: HCNC,CPTII,S$GLB, | Performed by: STUDENT IN AN ORGANIZED HEALTH CARE EDUCATION/TRAINING PROGRAM

## 2021-09-29 PROCEDURE — 1160F RVW MEDS BY RX/DR IN RCRD: CPT | Mod: HCNC,CPTII,S$GLB, | Performed by: STUDENT IN AN ORGANIZED HEALTH CARE EDUCATION/TRAINING PROGRAM

## 2021-09-29 PROCEDURE — 1126F AMNT PAIN NOTED NONE PRSNT: CPT | Mod: HCNC,CPTII,S$GLB, | Performed by: STUDENT IN AN ORGANIZED HEALTH CARE EDUCATION/TRAINING PROGRAM

## 2021-09-29 PROCEDURE — 63600175 PHARM REV CODE 636 W HCPCS: Mod: HCNC | Performed by: STUDENT IN AN ORGANIZED HEALTH CARE EDUCATION/TRAINING PROGRAM

## 2021-09-29 PROCEDURE — 1101F PT FALLS ASSESS-DOCD LE1/YR: CPT | Mod: HCNC,CPTII,S$GLB, | Performed by: STUDENT IN AN ORGANIZED HEALTH CARE EDUCATION/TRAINING PROGRAM

## 2021-09-29 PROCEDURE — 4010F PR ACE/ARB THEARPY RXD/TAKEN: ICD-10-PCS | Mod: HCNC,CPTII,S$GLB, | Performed by: STUDENT IN AN ORGANIZED HEALTH CARE EDUCATION/TRAINING PROGRAM

## 2021-09-29 PROCEDURE — 96367 TX/PROPH/DG ADDL SEQ IV INF: CPT | Mod: HCNC

## 2021-09-29 PROCEDURE — 3008F PR BODY MASS INDEX (BMI) DOCUMENTED: ICD-10-PCS | Mod: HCNC,CPTII,S$GLB, | Performed by: STUDENT IN AN ORGANIZED HEALTH CARE EDUCATION/TRAINING PROGRAM

## 2021-09-29 PROCEDURE — 99999 PR PBB SHADOW E&M-EST. PATIENT-LVL IV: ICD-10-PCS | Mod: PBBFAC,HCNC,, | Performed by: STUDENT IN AN ORGANIZED HEALTH CARE EDUCATION/TRAINING PROGRAM

## 2021-09-29 PROCEDURE — 96417 CHEMO IV INFUS EACH ADDL SEQ: CPT | Mod: HCNC

## 2021-09-29 PROCEDURE — 1160F PR REVIEW ALL MEDS BY PRESCRIBER/CLIN PHARMACIST DOCUMENTED: ICD-10-PCS | Mod: HCNC,CPTII,S$GLB, | Performed by: STUDENT IN AN ORGANIZED HEALTH CARE EDUCATION/TRAINING PROGRAM

## 2021-09-29 PROCEDURE — 3044F PR MOST RECENT HEMOGLOBIN A1C LEVEL <7.0%: ICD-10-PCS | Mod: HCNC,CPTII,S$GLB, | Performed by: STUDENT IN AN ORGANIZED HEALTH CARE EDUCATION/TRAINING PROGRAM

## 2021-09-29 PROCEDURE — 3078F PR MOST RECENT DIASTOLIC BLOOD PRESSURE < 80 MM HG: ICD-10-PCS | Mod: HCNC,CPTII,S$GLB, | Performed by: STUDENT IN AN ORGANIZED HEALTH CARE EDUCATION/TRAINING PROGRAM

## 2021-09-29 PROCEDURE — 25000003 PHARM REV CODE 250: Mod: HCNC | Performed by: STUDENT IN AN ORGANIZED HEALTH CARE EDUCATION/TRAINING PROGRAM

## 2021-09-29 PROCEDURE — 1159F PR MEDICATION LIST DOCUMENTED IN MEDICAL RECORD: ICD-10-PCS | Mod: HCNC,CPTII,S$GLB, | Performed by: STUDENT IN AN ORGANIZED HEALTH CARE EDUCATION/TRAINING PROGRAM

## 2021-09-29 PROCEDURE — 4010F ACE/ARB THERAPY RXD/TAKEN: CPT | Mod: HCNC,CPTII,S$GLB, | Performed by: STUDENT IN AN ORGANIZED HEALTH CARE EDUCATION/TRAINING PROGRAM

## 2021-09-29 PROCEDURE — 99215 PR OFFICE/OUTPT VISIT, EST, LEVL V, 40-54 MIN: ICD-10-PCS | Mod: HCNC,S$GLB,, | Performed by: STUDENT IN AN ORGANIZED HEALTH CARE EDUCATION/TRAINING PROGRAM

## 2021-09-29 PROCEDURE — 1159F MED LIST DOCD IN RCRD: CPT | Mod: HCNC,CPTII,S$GLB, | Performed by: STUDENT IN AN ORGANIZED HEALTH CARE EDUCATION/TRAINING PROGRAM

## 2021-09-29 PROCEDURE — 3288F FALL RISK ASSESSMENT DOCD: CPT | Mod: HCNC,CPTII,S$GLB, | Performed by: STUDENT IN AN ORGANIZED HEALTH CARE EDUCATION/TRAINING PROGRAM

## 2021-09-29 PROCEDURE — 96415 CHEMO IV INFUSION ADDL HR: CPT | Mod: HCNC

## 2021-09-29 PROCEDURE — 96413 CHEMO IV INFUSION 1 HR: CPT | Mod: HCNC

## 2021-09-29 PROCEDURE — 3008F BODY MASS INDEX DOCD: CPT | Mod: HCNC,CPTII,S$GLB, | Performed by: STUDENT IN AN ORGANIZED HEALTH CARE EDUCATION/TRAINING PROGRAM

## 2021-09-29 PROCEDURE — 96368 THER/DIAG CONCURRENT INF: CPT | Mod: HCNC

## 2021-09-29 RX ORDER — SODIUM CHLORIDE 0.9 % (FLUSH) 0.9 %
10 SYRINGE (ML) INJECTION
Status: CANCELLED | OUTPATIENT
Start: 2021-09-29

## 2021-09-29 RX ORDER — HEPARIN 100 UNIT/ML
500 SYRINGE INTRAVENOUS
Status: CANCELLED | OUTPATIENT
Start: 2021-09-30

## 2021-09-29 RX ORDER — DIPHENHYDRAMINE HYDROCHLORIDE 50 MG/ML
50 INJECTION INTRAMUSCULAR; INTRAVENOUS ONCE AS NEEDED
Status: DISCONTINUED | OUTPATIENT
Start: 2021-09-29 | End: 2021-09-29 | Stop reason: HOSPADM

## 2021-09-29 RX ORDER — EPINEPHRINE 0.3 MG/.3ML
0.3 INJECTION SUBCUTANEOUS ONCE AS NEEDED
Status: CANCELLED | OUTPATIENT
Start: 2021-09-29

## 2021-09-29 RX ORDER — DIPHENHYDRAMINE HYDROCHLORIDE 50 MG/ML
50 INJECTION INTRAMUSCULAR; INTRAVENOUS ONCE AS NEEDED
Status: CANCELLED | OUTPATIENT
Start: 2021-09-29

## 2021-09-29 RX ORDER — HEPARIN 100 UNIT/ML
500 SYRINGE INTRAVENOUS
Status: DISCONTINUED | OUTPATIENT
Start: 2021-09-29 | End: 2021-09-29 | Stop reason: HOSPADM

## 2021-09-29 RX ORDER — SODIUM CHLORIDE 0.9 % (FLUSH) 0.9 %
10 SYRINGE (ML) INJECTION
Status: CANCELLED | OUTPATIENT
Start: 2021-09-30

## 2021-09-29 RX ORDER — HEPARIN 100 UNIT/ML
500 SYRINGE INTRAVENOUS
Status: CANCELLED | OUTPATIENT
Start: 2021-09-29

## 2021-09-29 RX ORDER — SODIUM CHLORIDE 0.9 % (FLUSH) 0.9 %
10 SYRINGE (ML) INJECTION
Status: DISCONTINUED | OUTPATIENT
Start: 2021-09-29 | End: 2021-09-29 | Stop reason: HOSPADM

## 2021-09-29 RX ORDER — EPINEPHRINE 0.3 MG/.3ML
0.3 INJECTION SUBCUTANEOUS ONCE AS NEEDED
Status: DISCONTINUED | OUTPATIENT
Start: 2021-09-29 | End: 2021-09-29 | Stop reason: HOSPADM

## 2021-09-29 RX ADMIN — SODIUM CHLORIDE 105 MG: 9 INJECTION, SOLUTION INTRAVENOUS at 11:09

## 2021-09-29 RX ADMIN — LEUCOVORIN CALCIUM 525 MG: 350 INJECTION, POWDER, LYOPHILIZED, FOR SOLUTION INTRAMUSCULAR; INTRAVENOUS at 12:09

## 2021-09-29 RX ADMIN — PALONOSETRON HYDROCHLORIDE 0.25 MG: 0.25 INJECTION, SOLUTION INTRAVENOUS at 10:09

## 2021-09-29 RX ADMIN — DEXTROSE: 5 SOLUTION INTRAVENOUS at 12:09

## 2021-09-29 RX ADMIN — FLUOROURACIL 5000 MG: 50 INJECTION, SOLUTION INTRAVENOUS at 02:09

## 2021-09-29 RX ADMIN — OXALIPLATIN 223 MG: 5 INJECTION, SOLUTION INTRAVENOUS at 12:09

## 2021-09-29 RX ADMIN — SODIUM CHLORIDE: 0.9 INJECTION, SOLUTION INTRAVENOUS at 10:09

## 2021-09-30 ENCOUNTER — INFUSION (OUTPATIENT)
Dept: INFUSION THERAPY | Facility: OTHER | Age: 71
End: 2021-09-30
Attending: STUDENT IN AN ORGANIZED HEALTH CARE EDUCATION/TRAINING PROGRAM
Payer: MEDICARE

## 2021-09-30 VITALS
SYSTOLIC BLOOD PRESSURE: 101 MMHG | OXYGEN SATURATION: 97 % | DIASTOLIC BLOOD PRESSURE: 64 MMHG | TEMPERATURE: 98 F | HEART RATE: 94 BPM | RESPIRATION RATE: 16 BRPM

## 2021-09-30 DIAGNOSIS — C16.9 GASTRIC ADENOCARCINOMA: Primary | ICD-10-CM

## 2021-09-30 PROCEDURE — 63600175 PHARM REV CODE 636 W HCPCS: Mod: HCNC | Performed by: STUDENT IN AN ORGANIZED HEALTH CARE EDUCATION/TRAINING PROGRAM

## 2021-09-30 PROCEDURE — 96521 REFILL/MAINT PORTABLE PUMP: CPT | Mod: HCNC

## 2021-09-30 PROCEDURE — 25000003 PHARM REV CODE 250: Mod: HCNC | Performed by: STUDENT IN AN ORGANIZED HEALTH CARE EDUCATION/TRAINING PROGRAM

## 2021-09-30 PROCEDURE — A4216 STERILE WATER/SALINE, 10 ML: HCPCS | Mod: HCNC | Performed by: STUDENT IN AN ORGANIZED HEALTH CARE EDUCATION/TRAINING PROGRAM

## 2021-09-30 RX ORDER — SODIUM CHLORIDE 0.9 % (FLUSH) 0.9 %
10 SYRINGE (ML) INJECTION
Status: DISCONTINUED | OUTPATIENT
Start: 2021-09-30 | End: 2021-09-30 | Stop reason: HOSPADM

## 2021-09-30 RX ORDER — HEPARIN 100 UNIT/ML
500 SYRINGE INTRAVENOUS
Status: DISCONTINUED | OUTPATIENT
Start: 2021-09-30 | End: 2021-09-30 | Stop reason: HOSPADM

## 2021-09-30 RX ADMIN — HEPARIN 500 UNITS: 100 SYRINGE at 02:09

## 2021-09-30 RX ADMIN — Medication 10 ML: at 02:09

## 2021-10-04 DIAGNOSIS — C16.9 GASTRIC ADENOCARCINOMA: Primary | ICD-10-CM

## 2021-10-05 ENCOUNTER — TELEPHONE (OUTPATIENT)
Dept: SURGERY | Facility: CLINIC | Age: 71
End: 2021-10-05

## 2021-10-08 ENCOUNTER — HOSPITAL ENCOUNTER (OUTPATIENT)
Dept: RADIOLOGY | Facility: OTHER | Age: 71
Discharge: HOME | End: 2021-10-08
Attending: NURSE PRACTITIONER
Payer: MEDICARE

## 2021-10-08 DIAGNOSIS — C16.9 GASTRIC ADENOCARCINOMA: ICD-10-CM

## 2021-10-08 PROCEDURE — 71260 CT CHEST ABDOMEN PELVIS WITH CONTRAST (XPD): ICD-10-PCS | Mod: 26,HCNC,, | Performed by: RADIOLOGY

## 2021-10-08 PROCEDURE — 71260 CT THORAX DX C+: CPT | Mod: 26,HCNC,, | Performed by: RADIOLOGY

## 2021-10-08 PROCEDURE — 25500020 PHARM REV CODE 255: Mod: HCNC | Performed by: NURSE PRACTITIONER

## 2021-10-08 PROCEDURE — 74177 CT CHEST ABDOMEN PELVIS WITH CONTRAST (XPD): ICD-10-PCS | Mod: 26,HCNC,, | Performed by: RADIOLOGY

## 2021-10-08 PROCEDURE — 71260 CT THORAX DX C+: CPT | Mod: TC,HCNC

## 2021-10-08 PROCEDURE — 74177 CT ABD & PELVIS W/CONTRAST: CPT | Mod: TC,HCNC

## 2021-10-08 PROCEDURE — 74177 CT ABD & PELVIS W/CONTRAST: CPT | Mod: 26,HCNC,, | Performed by: RADIOLOGY

## 2021-10-08 PROCEDURE — A9698 NON-RAD CONTRAST MATERIALNOC: HCPCS | Mod: HCNC | Performed by: NURSE PRACTITIONER

## 2021-10-08 RX ADMIN — IOHEXOL 100 ML: 350 INJECTION, SOLUTION INTRAVENOUS at 02:10

## 2021-10-08 RX ADMIN — IOHEXOL 1000 ML: 12 SOLUTION ORAL at 01:10

## 2021-10-14 ENCOUNTER — HOSPITAL ENCOUNTER (INPATIENT)
Facility: OTHER | Age: 71
LOS: 6 days | Discharge: HOME HEALTH | DRG: 872 | End: 2021-10-20
Attending: EMERGENCY MEDICINE | Admitting: EMERGENCY MEDICINE
Payer: MEDICARE

## 2021-10-14 DIAGNOSIS — D64.9 ANEMIA, UNSPECIFIED TYPE: ICD-10-CM

## 2021-10-14 DIAGNOSIS — A41.9 SEPSIS, DUE TO UNSPECIFIED ORGANISM, UNSPECIFIED WHETHER ACUTE ORGAN DYSFUNCTION PRESENT: Primary | ICD-10-CM

## 2021-10-14 DIAGNOSIS — R06.02 SOB (SHORTNESS OF BREATH): ICD-10-CM

## 2021-10-14 DIAGNOSIS — C16.9 GASTRIC ADENOCARCINOMA: ICD-10-CM

## 2021-10-14 DIAGNOSIS — M17.12 PRIMARY OSTEOARTHRITIS OF LEFT KNEE: ICD-10-CM

## 2021-10-14 DIAGNOSIS — R19.7 DIARRHEA, UNSPECIFIED TYPE: ICD-10-CM

## 2021-10-14 DIAGNOSIS — I95.9 HYPOTENSION, UNSPECIFIED HYPOTENSION TYPE: ICD-10-CM

## 2021-10-14 DIAGNOSIS — E66.01 CLASS 3 SEVERE OBESITY DUE TO EXCESS CALORIES WITH SERIOUS COMORBIDITY AND BODY MASS INDEX (BMI) OF 50.0 TO 59.9 IN ADULT: ICD-10-CM

## 2021-10-14 DIAGNOSIS — R09.02 HYPOXIA: ICD-10-CM

## 2021-10-14 DIAGNOSIS — R53.81 DEBILITY: ICD-10-CM

## 2021-10-14 DIAGNOSIS — M25.562 CHRONIC PAIN OF LEFT KNEE: ICD-10-CM

## 2021-10-14 DIAGNOSIS — G47.33 OBSTRUCTIVE SLEEP APNEA: ICD-10-CM

## 2021-10-14 DIAGNOSIS — E87.6 HYPOKALEMIA: ICD-10-CM

## 2021-10-14 DIAGNOSIS — A04.72 C. DIFFICILE DIARRHEA: ICD-10-CM

## 2021-10-14 DIAGNOSIS — R53.1 WEAKNESS: ICD-10-CM

## 2021-10-14 DIAGNOSIS — G89.29 CHRONIC PAIN OF LEFT KNEE: ICD-10-CM

## 2021-10-14 DIAGNOSIS — I82.4Z9 ACUTE DEEP VEIN THROMBOSIS (DVT) OF DISTAL VEIN OF LOWER EXTREMITY, UNSPECIFIED LATERALITY: ICD-10-CM

## 2021-10-14 LAB
ALBUMIN SERPL BCP-MCNC: 2.8 G/DL (ref 3.5–5.2)
ALP SERPL-CCNC: 82 U/L (ref 55–135)
ALT SERPL W/O P-5'-P-CCNC: 15 U/L (ref 10–44)
ANION GAP SERPL CALC-SCNC: 16 MMOL/L (ref 8–16)
AST SERPL-CCNC: 17 U/L (ref 10–40)
BACTERIA #/AREA URNS HPF: ABNORMAL /HPF
BASOPHILS # BLD AUTO: 0.15 K/UL (ref 0–0.2)
BASOPHILS NFR BLD: 0.8 % (ref 0–1.9)
BILIRUB SERPL-MCNC: 0.8 MG/DL (ref 0.1–1)
BILIRUB UR QL STRIP: NEGATIVE
BUN SERPL-MCNC: 27 MG/DL (ref 8–23)
C DIFF GDH STL QL: POSITIVE
C DIFF TOX A+B STL QL IA: POSITIVE
CALCIUM SERPL-MCNC: 9 MG/DL (ref 8.7–10.5)
CHLORIDE SERPL-SCNC: 107 MMOL/L (ref 95–110)
CLARITY UR: CLEAR
CO2 SERPL-SCNC: 16 MMOL/L (ref 23–29)
COLOR UR: YELLOW
CREAT SERPL-MCNC: 1.6 MG/DL (ref 0.5–1.4)
CREAT SERPL-MCNC: 1.6 MG/DL (ref 0.5–1.4)
CTP QC/QA: YES
DIFFERENTIAL METHOD: ABNORMAL
EOSINOPHIL # BLD AUTO: 0 K/UL (ref 0–0.5)
EOSINOPHIL NFR BLD: 0 % (ref 0–8)
ERYTHROCYTE [DISTWIDTH] IN BLOOD BY AUTOMATED COUNT: 14.6 % (ref 11.5–14.5)
EST. GFR  (AFRICAN AMERICAN): 38 ML/MIN/1.73 M^2
EST. GFR  (NON AFRICAN AMERICAN): 33 ML/MIN/1.73 M^2
GLUCOSE SERPL-MCNC: 102 MG/DL (ref 70–110)
GLUCOSE UR QL STRIP: NEGATIVE
HCT VFR BLD AUTO: 33.2 % (ref 37–48.5)
HGB BLD-MCNC: 10.7 G/DL (ref 12–16)
HGB UR QL STRIP: NEGATIVE
HYALINE CASTS #/AREA URNS LPF: 10 /LPF
IMM GRANULOCYTES # BLD AUTO: 0.49 K/UL (ref 0–0.04)
IMM GRANULOCYTES NFR BLD AUTO: 2.5 % (ref 0–0.5)
KETONES UR QL STRIP: NEGATIVE
LACTATE SERPL-SCNC: 1.7 MMOL/L (ref 0.5–2.2)
LEUKOCYTE ESTERASE UR QL STRIP: NEGATIVE
LYMPHOCYTES # BLD AUTO: 3 K/UL (ref 1–4.8)
LYMPHOCYTES NFR BLD: 14.9 % (ref 18–48)
MCH RBC QN AUTO: 30.3 PG (ref 27–31)
MCHC RBC AUTO-ENTMCNC: 32.2 G/DL (ref 32–36)
MCV RBC AUTO: 94 FL (ref 82–98)
MICROSCOPIC COMMENT: ABNORMAL
MONOCYTES # BLD AUTO: 3.7 K/UL (ref 0.3–1)
MONOCYTES NFR BLD: 18.4 % (ref 4–15)
NEUTROPHILS # BLD AUTO: 12.6 K/UL (ref 1.8–7.7)
NEUTROPHILS NFR BLD: 63.4 % (ref 38–73)
NITRITE UR QL STRIP: NEGATIVE
NRBC BLD-RTO: 0 /100 WBC
PH UR STRIP: 6 [PH] (ref 5–8)
PLATELET # BLD AUTO: 175 K/UL (ref 150–450)
PMV BLD AUTO: 10.6 FL (ref 9.2–12.9)
POTASSIUM SERPL-SCNC: 2.8 MMOL/L (ref 3.5–5.1)
PROT SERPL-MCNC: 6 G/DL (ref 6–8.4)
PROT UR QL STRIP: ABNORMAL
RBC # BLD AUTO: 3.53 M/UL (ref 4–5.4)
RBC #/AREA URNS HPF: 3 /HPF (ref 0–4)
SAMPLE: ABNORMAL
SARS-COV-2 RDRP RESP QL NAA+PROBE: NEGATIVE
SODIUM SERPL-SCNC: 139 MMOL/L (ref 136–145)
SP GR UR STRIP: 1.01 (ref 1–1.03)
SQUAMOUS #/AREA URNS HPF: 3 /HPF
URN SPEC COLLECT METH UR: ABNORMAL
UROBILINOGEN UR STRIP-ACNC: NEGATIVE EU/DL
WBC # BLD AUTO: 19.59 K/UL (ref 3.9–12.7)
WBC #/AREA STL HPF: NORMAL /[HPF]
WBC #/AREA URNS HPF: 2 /HPF (ref 0–5)

## 2021-10-14 PROCEDURE — 83605 ASSAY OF LACTIC ACID: CPT | Mod: HCNC | Performed by: EMERGENCY MEDICINE

## 2021-10-14 PROCEDURE — 93010 ELECTROCARDIOGRAM REPORT: CPT | Mod: HCNC,,, | Performed by: INTERNAL MEDICINE

## 2021-10-14 PROCEDURE — 27000207 HC ISOLATION: Mod: HCNC

## 2021-10-14 PROCEDURE — 93005 ELECTROCARDIOGRAM TRACING: CPT | Mod: HCNC

## 2021-10-14 PROCEDURE — 25000003 PHARM REV CODE 250: Mod: HCNC | Performed by: EMERGENCY MEDICINE

## 2021-10-14 PROCEDURE — 87177 OVA AND PARASITES SMEARS: CPT | Mod: HCNC | Performed by: EMERGENCY MEDICINE

## 2021-10-14 PROCEDURE — 25000003 PHARM REV CODE 250: Mod: HCNC | Performed by: INTERNAL MEDICINE

## 2021-10-14 PROCEDURE — 82565 ASSAY OF CREATININE: CPT | Mod: HCNC

## 2021-10-14 PROCEDURE — 81000 URINALYSIS NONAUTO W/SCOPE: CPT | Mod: HCNC | Performed by: EMERGENCY MEDICINE

## 2021-10-14 PROCEDURE — 51702 INSERT TEMP BLADDER CATH: CPT | Mod: HCNC

## 2021-10-14 PROCEDURE — 63600175 PHARM REV CODE 636 W HCPCS: Mod: HCNC | Performed by: EMERGENCY MEDICINE

## 2021-10-14 PROCEDURE — 87046 STOOL CULTR AEROBIC BACT EA: CPT | Mod: 59,HCNC | Performed by: EMERGENCY MEDICINE

## 2021-10-14 PROCEDURE — 87449 NOS EACH ORGANISM AG IA: CPT | Mod: HCNC | Performed by: EMERGENCY MEDICINE

## 2021-10-14 PROCEDURE — 87040 BLOOD CULTURE FOR BACTERIA: CPT | Mod: 59,HCNC | Performed by: EMERGENCY MEDICINE

## 2021-10-14 PROCEDURE — 99285 EMERGENCY DEPT VISIT HI MDM: CPT | Mod: 25,HCNC

## 2021-10-14 PROCEDURE — 87045 FECES CULTURE AEROBIC BACT: CPT | Mod: HCNC | Performed by: EMERGENCY MEDICINE

## 2021-10-14 PROCEDURE — 36415 COLL VENOUS BLD VENIPUNCTURE: CPT | Mod: HCNC | Performed by: EMERGENCY MEDICINE

## 2021-10-14 PROCEDURE — 85025 COMPLETE CBC W/AUTO DIFF WBC: CPT | Mod: HCNC | Performed by: EMERGENCY MEDICINE

## 2021-10-14 PROCEDURE — 93010 EKG 12-LEAD: ICD-10-PCS | Mod: HCNC,,, | Performed by: INTERNAL MEDICINE

## 2021-10-14 PROCEDURE — 87427 SHIGA-LIKE TOXIN AG IA: CPT | Mod: HCNC | Performed by: EMERGENCY MEDICINE

## 2021-10-14 PROCEDURE — 80053 COMPREHEN METABOLIC PANEL: CPT | Mod: HCNC | Performed by: EMERGENCY MEDICINE

## 2021-10-14 PROCEDURE — 99900035 HC TECH TIME PER 15 MIN (STAT): Mod: HCNC

## 2021-10-14 PROCEDURE — 87324 CLOSTRIDIUM AG IA: CPT | Mod: HCNC | Performed by: EMERGENCY MEDICINE

## 2021-10-14 PROCEDURE — 87209 SMEAR COMPLEX STAIN: CPT | Mod: HCNC | Performed by: EMERGENCY MEDICINE

## 2021-10-14 PROCEDURE — 89055 LEUKOCYTE ASSESSMENT FECAL: CPT | Mod: HCNC | Performed by: EMERGENCY MEDICINE

## 2021-10-14 PROCEDURE — 99223 1ST HOSP IP/OBS HIGH 75: CPT | Mod: HCNC,AI,, | Performed by: INTERNAL MEDICINE

## 2021-10-14 PROCEDURE — 82803 BLOOD GASES ANY COMBINATION: CPT | Mod: HCNC

## 2021-10-14 PROCEDURE — 96361 HYDRATE IV INFUSION ADD-ON: CPT | Mod: HCNC

## 2021-10-14 PROCEDURE — 96365 THER/PROPH/DIAG IV INF INIT: CPT | Mod: HCNC

## 2021-10-14 PROCEDURE — 99223 PR INITIAL HOSPITAL CARE,LEVL III: ICD-10-PCS | Mod: HCNC,AI,, | Performed by: INTERNAL MEDICINE

## 2021-10-14 PROCEDURE — 21400001 HC TELEMETRY ROOM: Mod: HCNC

## 2021-10-14 PROCEDURE — 96366 THER/PROPH/DIAG IV INF ADDON: CPT | Mod: HCNC

## 2021-10-14 PROCEDURE — U0002 COVID-19 LAB TEST NON-CDC: HCPCS | Mod: HCNC | Performed by: EMERGENCY MEDICINE

## 2021-10-14 RX ORDER — CITALOPRAM 10 MG/1
10 TABLET ORAL DAILY
Status: DISCONTINUED | OUTPATIENT
Start: 2021-10-15 | End: 2021-10-21 | Stop reason: HOSPADM

## 2021-10-14 RX ORDER — POTASSIUM CHLORIDE 20 MEQ/1
40 TABLET, EXTENDED RELEASE ORAL ONCE
Status: COMPLETED | OUTPATIENT
Start: 2021-10-14 | End: 2021-10-14

## 2021-10-14 RX ORDER — POTASSIUM CHLORIDE 20 MEQ/1
20 TABLET, EXTENDED RELEASE ORAL ONCE
Status: DISCONTINUED | OUTPATIENT
Start: 2021-10-14 | End: 2021-10-14

## 2021-10-14 RX ORDER — TALC
6 POWDER (GRAM) TOPICAL NIGHTLY PRN
Status: DISCONTINUED | OUTPATIENT
Start: 2021-10-14 | End: 2021-10-21 | Stop reason: HOSPADM

## 2021-10-14 RX ORDER — ACETAMINOPHEN 325 MG/1
650 TABLET ORAL EVERY 6 HOURS PRN
Status: DISCONTINUED | OUTPATIENT
Start: 2021-10-14 | End: 2021-10-21 | Stop reason: HOSPADM

## 2021-10-14 RX ORDER — SODIUM CHLORIDE 9 MG/ML
INJECTION, SOLUTION INTRAVENOUS CONTINUOUS
Status: DISCONTINUED | OUTPATIENT
Start: 2021-10-14 | End: 2021-10-16

## 2021-10-14 RX ORDER — OXYCODONE AND ACETAMINOPHEN 5; 325 MG/1; MG/1
1 TABLET ORAL EVERY 8 HOURS PRN
Status: DISCONTINUED | OUTPATIENT
Start: 2021-10-14 | End: 2021-10-21 | Stop reason: HOSPADM

## 2021-10-14 RX ORDER — SODIUM CHLORIDE 9 MG/ML
INJECTION, SOLUTION INTRAVENOUS
Status: DISCONTINUED | OUTPATIENT
Start: 2021-10-14 | End: 2021-10-21 | Stop reason: HOSPADM

## 2021-10-14 RX ORDER — SODIUM CHLORIDE 0.9 % (FLUSH) 0.9 %
10 SYRINGE (ML) INJECTION
Status: DISCONTINUED | OUTPATIENT
Start: 2021-10-14 | End: 2021-10-21 | Stop reason: HOSPADM

## 2021-10-14 RX ORDER — ATORVASTATIN CALCIUM 20 MG/1
20 TABLET, FILM COATED ORAL DAILY
Status: DISCONTINUED | OUTPATIENT
Start: 2021-10-15 | End: 2021-10-21 | Stop reason: HOSPADM

## 2021-10-14 RX ADMIN — VANCOMYCIN HYDROCHLORIDE 2250 MG: 750 INJECTION, POWDER, LYOPHILIZED, FOR SOLUTION INTRAVENOUS at 12:10

## 2021-10-14 RX ADMIN — SODIUM CHLORIDE, SODIUM LACTATE, POTASSIUM CHLORIDE, AND CALCIUM CHLORIDE 3000 ML: .6; .31; .03; .02 INJECTION, SOLUTION INTRAVENOUS at 10:10

## 2021-10-14 RX ADMIN — PIPERACILLIN SODIUM AND TAZOBACTAM SODIUM 4.5 G: 4; .5 INJECTION, POWDER, FOR SOLUTION INTRAVENOUS at 08:10

## 2021-10-14 RX ADMIN — POTASSIUM CHLORIDE 40 MEQ: 1500 TABLET, EXTENDED RELEASE ORAL at 04:10

## 2021-10-14 RX ADMIN — APIXABAN 5 MG: 2.5 TABLET, FILM COATED ORAL at 09:10

## 2021-10-14 RX ADMIN — SODIUM CHLORIDE: 0.9 INJECTION, SOLUTION INTRAVENOUS at 08:10

## 2021-10-14 RX ADMIN — PIPERACILLIN SODIUM AND TAZOBACTAM SODIUM 4.5 G: 4; .5 INJECTION, POWDER, FOR SOLUTION INTRAVENOUS at 12:10

## 2021-10-14 RX ADMIN — POTASSIUM BICARBONATE 50 MEQ: 978 TABLET, EFFERVESCENT ORAL at 12:10

## 2021-10-15 PROBLEM — E87.20 METABOLIC ACIDOSIS: Status: ACTIVE | Noted: 2021-10-15

## 2021-10-15 PROBLEM — N17.9 AKI (ACUTE KIDNEY INJURY): Status: ACTIVE | Noted: 2021-10-15

## 2021-10-15 PROBLEM — I82.409 ACUTE DEEP VEIN THROMBOSIS (DVT) OF LOWER EXTREMITY: Status: ACTIVE | Noted: 2021-10-15

## 2021-10-15 LAB
ALBUMIN SERPL BCP-MCNC: 2.4 G/DL (ref 3.5–5.2)
ALP SERPL-CCNC: 81 U/L (ref 55–135)
ALT SERPL W/O P-5'-P-CCNC: 14 U/L (ref 10–44)
ANION GAP SERPL CALC-SCNC: 12 MMOL/L (ref 8–16)
AST SERPL-CCNC: 17 U/L (ref 10–40)
BASOPHILS # BLD AUTO: 0.07 K/UL (ref 0–0.2)
BASOPHILS NFR BLD: 0.5 % (ref 0–1.9)
BILIRUB SERPL-MCNC: 0.7 MG/DL (ref 0.1–1)
BUN SERPL-MCNC: 21 MG/DL (ref 8–23)
CALCIUM SERPL-MCNC: 8.6 MG/DL (ref 8.7–10.5)
CHLORIDE SERPL-SCNC: 110 MMOL/L (ref 95–110)
CO2 SERPL-SCNC: 19 MMOL/L (ref 23–29)
CREAT SERPL-MCNC: 0.9 MG/DL (ref 0.5–1.4)
DIFFERENTIAL METHOD: ABNORMAL
E COLI SXT1 STL QL IA: NEGATIVE
E COLI SXT2 STL QL IA: NEGATIVE
EOSINOPHIL # BLD AUTO: 0 K/UL (ref 0–0.5)
EOSINOPHIL NFR BLD: 0.1 % (ref 0–8)
ERYTHROCYTE [DISTWIDTH] IN BLOOD BY AUTOMATED COUNT: 14.6 % (ref 11.5–14.5)
EST. GFR  (AFRICAN AMERICAN): >60 ML/MIN/1.73 M^2
EST. GFR  (NON AFRICAN AMERICAN): >60 ML/MIN/1.73 M^2
GLUCOSE SERPL-MCNC: 82 MG/DL (ref 70–110)
HCT VFR BLD AUTO: 33.1 % (ref 37–48.5)
HGB BLD-MCNC: 10.3 G/DL (ref 12–16)
IMM GRANULOCYTES # BLD AUTO: 0.2 K/UL (ref 0–0.04)
IMM GRANULOCYTES NFR BLD AUTO: 1.3 % (ref 0–0.5)
LYMPHOCYTES # BLD AUTO: 2.6 K/UL (ref 1–4.8)
LYMPHOCYTES NFR BLD: 16.7 % (ref 18–48)
MAGNESIUM SERPL-MCNC: 1.8 MG/DL (ref 1.6–2.6)
MCH RBC QN AUTO: 29.9 PG (ref 27–31)
MCHC RBC AUTO-ENTMCNC: 31.1 G/DL (ref 32–36)
MCV RBC AUTO: 96 FL (ref 82–98)
MONOCYTES # BLD AUTO: 2.7 K/UL (ref 0.3–1)
MONOCYTES NFR BLD: 17.3 % (ref 4–15)
NEUTROPHILS # BLD AUTO: 9.9 K/UL (ref 1.8–7.7)
NEUTROPHILS NFR BLD: 64.1 % (ref 38–73)
NRBC BLD-RTO: 0 /100 WBC
PHOSPHATE SERPL-MCNC: 3.6 MG/DL (ref 2.7–4.5)
PLATELET # BLD AUTO: 167 K/UL (ref 150–450)
PLATELET BLD QL SMEAR: ABNORMAL
PMV BLD AUTO: 11 FL (ref 9.2–12.9)
POTASSIUM SERPL-SCNC: 4.1 MMOL/L (ref 3.5–5.1)
PROT SERPL-MCNC: 5.3 G/DL (ref 6–8.4)
RBC # BLD AUTO: 3.44 M/UL (ref 4–5.4)
SODIUM SERPL-SCNC: 141 MMOL/L (ref 136–145)
WBC # BLD AUTO: 15.38 K/UL (ref 3.9–12.7)

## 2021-10-15 PROCEDURE — 25000003 PHARM REV CODE 250: Mod: HCNC | Performed by: EMERGENCY MEDICINE

## 2021-10-15 PROCEDURE — 25000003 PHARM REV CODE 250: Mod: HCNC | Performed by: INTERNAL MEDICINE

## 2021-10-15 PROCEDURE — 99233 SBSQ HOSP IP/OBS HIGH 50: CPT | Mod: HCNC,,, | Performed by: INTERNAL MEDICINE

## 2021-10-15 PROCEDURE — 84100 ASSAY OF PHOSPHORUS: CPT | Mod: HCNC | Performed by: INTERNAL MEDICINE

## 2021-10-15 PROCEDURE — 63600175 PHARM REV CODE 636 W HCPCS: Mod: HCNC | Performed by: INTERNAL MEDICINE

## 2021-10-15 PROCEDURE — 94761 N-INVAS EAR/PLS OXIMETRY MLT: CPT | Mod: HCNC

## 2021-10-15 PROCEDURE — A4216 STERILE WATER/SALINE, 10 ML: HCPCS | Mod: HCNC | Performed by: INTERNAL MEDICINE

## 2021-10-15 PROCEDURE — 83735 ASSAY OF MAGNESIUM: CPT | Mod: HCNC | Performed by: INTERNAL MEDICINE

## 2021-10-15 PROCEDURE — 27000207 HC ISOLATION: Mod: HCNC

## 2021-10-15 PROCEDURE — 63600175 PHARM REV CODE 636 W HCPCS: Mod: HCNC | Performed by: EMERGENCY MEDICINE

## 2021-10-15 PROCEDURE — 85025 COMPLETE CBC W/AUTO DIFF WBC: CPT | Mod: HCNC | Performed by: INTERNAL MEDICINE

## 2021-10-15 PROCEDURE — 63600175 PHARM REV CODE 636 W HCPCS: Mod: HCNC | Performed by: NURSE PRACTITIONER

## 2021-10-15 PROCEDURE — 99233 PR SUBSEQUENT HOSPITAL CARE,LEVL III: ICD-10-PCS | Mod: HCNC,,, | Performed by: INTERNAL MEDICINE

## 2021-10-15 PROCEDURE — 36415 COLL VENOUS BLD VENIPUNCTURE: CPT | Mod: HCNC | Performed by: INTERNAL MEDICINE

## 2021-10-15 PROCEDURE — 80053 COMPREHEN METABOLIC PANEL: CPT | Mod: HCNC | Performed by: INTERNAL MEDICINE

## 2021-10-15 PROCEDURE — 21400001 HC TELEMETRY ROOM: Mod: HCNC

## 2021-10-15 PROCEDURE — 27000221 HC OXYGEN, UP TO 24 HOURS: Mod: HCNC

## 2021-10-15 RX ORDER — MORPHINE SULFATE 2 MG/ML
2 INJECTION, SOLUTION INTRAMUSCULAR; INTRAVENOUS ONCE
Status: COMPLETED | OUTPATIENT
Start: 2021-10-15 | End: 2021-10-15

## 2021-10-15 RX ORDER — MUPIROCIN 20 MG/G
OINTMENT TOPICAL 2 TIMES DAILY
Status: COMPLETED | OUTPATIENT
Start: 2021-10-15 | End: 2021-10-19

## 2021-10-15 RX ADMIN — PIPERACILLIN SODIUM AND TAZOBACTAM SODIUM 4.5 G: 4; .5 INJECTION, POWDER, FOR SOLUTION INTRAVENOUS at 04:10

## 2021-10-15 RX ADMIN — Medication 125 MG: at 05:10

## 2021-10-15 RX ADMIN — OXYCODONE HYDROCHLORIDE AND ACETAMINOPHEN 1 TABLET: 5; 325 TABLET ORAL at 09:10

## 2021-10-15 RX ADMIN — APIXABAN 5 MG: 2.5 TABLET, FILM COATED ORAL at 09:10

## 2021-10-15 RX ADMIN — ATORVASTATIN CALCIUM 20 MG: 20 TABLET, FILM COATED ORAL at 09:10

## 2021-10-15 RX ADMIN — CITALOPRAM HYDROBROMIDE 10 MG: 10 TABLET ORAL at 09:10

## 2021-10-15 RX ADMIN — MORPHINE SULFATE 2 MG: 2 INJECTION, SOLUTION INTRAMUSCULAR; INTRAVENOUS at 11:10

## 2021-10-15 RX ADMIN — SODIUM CHLORIDE: 0.9 INJECTION, SOLUTION INTRAVENOUS at 12:10

## 2021-10-15 RX ADMIN — MUPIROCIN: 20 OINTMENT TOPICAL at 09:10

## 2021-10-15 RX ADMIN — OXYCODONE HYDROCHLORIDE AND ACETAMINOPHEN 1 TABLET: 5; 325 TABLET ORAL at 01:10

## 2021-10-15 RX ADMIN — Medication 125 MG: at 11:10

## 2021-10-15 RX ADMIN — Medication 125 MG: at 12:10

## 2021-10-16 LAB
ALBUMIN SERPL BCP-MCNC: 2.3 G/DL (ref 3.5–5.2)
ALP SERPL-CCNC: 79 U/L (ref 55–135)
ALT SERPL W/O P-5'-P-CCNC: 14 U/L (ref 10–44)
ANION GAP SERPL CALC-SCNC: 10 MMOL/L (ref 8–16)
ANISOCYTOSIS BLD QL SMEAR: SLIGHT
AST SERPL-CCNC: 14 U/L (ref 10–40)
BASOPHILS # BLD AUTO: 0.04 K/UL (ref 0–0.2)
BASOPHILS NFR BLD: 0.3 % (ref 0–1.9)
BILIRUB SERPL-MCNC: 0.7 MG/DL (ref 0.1–1)
BUN SERPL-MCNC: 13 MG/DL (ref 8–23)
CALCIUM SERPL-MCNC: 8.5 MG/DL (ref 8.7–10.5)
CHLORIDE SERPL-SCNC: 116 MMOL/L (ref 95–110)
CO2 SERPL-SCNC: 19 MMOL/L (ref 23–29)
CREAT SERPL-MCNC: 0.7 MG/DL (ref 0.5–1.4)
DACRYOCYTES BLD QL SMEAR: ABNORMAL
DIFFERENTIAL METHOD: ABNORMAL
EOSINOPHIL # BLD AUTO: 0 K/UL (ref 0–0.5)
EOSINOPHIL NFR BLD: 0.1 % (ref 0–8)
ERYTHROCYTE [DISTWIDTH] IN BLOOD BY AUTOMATED COUNT: 14.9 % (ref 11.5–14.5)
EST. GFR  (AFRICAN AMERICAN): >60 ML/MIN/1.73 M^2
EST. GFR  (NON AFRICAN AMERICAN): >60 ML/MIN/1.73 M^2
GLUCOSE SERPL-MCNC: 91 MG/DL (ref 70–110)
HCT VFR BLD AUTO: 31.4 % (ref 37–48.5)
HGB BLD-MCNC: 10.1 G/DL (ref 12–16)
IMM GRANULOCYTES # BLD AUTO: 0.24 K/UL (ref 0–0.04)
IMM GRANULOCYTES NFR BLD AUTO: 1.9 % (ref 0–0.5)
LYMPHOCYTES # BLD AUTO: 3.2 K/UL (ref 1–4.8)
LYMPHOCYTES NFR BLD: 25.6 % (ref 18–48)
MAGNESIUM SERPL-MCNC: 1.7 MG/DL (ref 1.6–2.6)
MCH RBC QN AUTO: 30.7 PG (ref 27–31)
MCHC RBC AUTO-ENTMCNC: 32.2 G/DL (ref 32–36)
MCV RBC AUTO: 95 FL (ref 82–98)
MONOCYTES # BLD AUTO: 1.6 K/UL (ref 0.3–1)
MONOCYTES NFR BLD: 12.8 % (ref 4–15)
NEUTROPHILS # BLD AUTO: 7.4 K/UL (ref 1.8–7.7)
NEUTROPHILS NFR BLD: 59.3 % (ref 38–73)
NRBC BLD-RTO: 0 /100 WBC
OVALOCYTES BLD QL SMEAR: ABNORMAL
PHOSPHATE SERPL-MCNC: 3.7 MG/DL (ref 2.7–4.5)
PLATELET # BLD AUTO: 180 K/UL (ref 150–450)
PLATELET BLD QL SMEAR: ABNORMAL
PMV BLD AUTO: 10.2 FL (ref 9.2–12.9)
POIKILOCYTOSIS BLD QL SMEAR: SLIGHT
POLYCHROMASIA BLD QL SMEAR: ABNORMAL
POTASSIUM SERPL-SCNC: 3.3 MMOL/L (ref 3.5–5.1)
PROT SERPL-MCNC: 5.2 G/DL (ref 6–8.4)
RBC # BLD AUTO: 3.29 M/UL (ref 4–5.4)
SCHISTOCYTES BLD QL SMEAR: PRESENT
SODIUM SERPL-SCNC: 145 MMOL/L (ref 136–145)
WBC # BLD AUTO: 12.47 K/UL (ref 3.9–12.7)

## 2021-10-16 PROCEDURE — C1751 CATH, INF, PER/CENT/MIDLINE: HCPCS | Mod: HCNC

## 2021-10-16 PROCEDURE — 63600175 PHARM REV CODE 636 W HCPCS: Mod: HCNC | Performed by: INTERNAL MEDICINE

## 2021-10-16 PROCEDURE — 27000221 HC OXYGEN, UP TO 24 HOURS: Mod: HCNC

## 2021-10-16 PROCEDURE — 99233 SBSQ HOSP IP/OBS HIGH 50: CPT | Mod: HCNC,,, | Performed by: INTERNAL MEDICINE

## 2021-10-16 PROCEDURE — 76937 US GUIDE VASCULAR ACCESS: CPT | Mod: HCNC

## 2021-10-16 PROCEDURE — 80053 COMPREHEN METABOLIC PANEL: CPT | Mod: HCNC | Performed by: INTERNAL MEDICINE

## 2021-10-16 PROCEDURE — 97161 PT EVAL LOW COMPLEX 20 MIN: CPT | Mod: HCNC

## 2021-10-16 PROCEDURE — 94761 N-INVAS EAR/PLS OXIMETRY MLT: CPT | Mod: HCNC

## 2021-10-16 PROCEDURE — 99900035 HC TECH TIME PER 15 MIN (STAT): Mod: HCNC

## 2021-10-16 PROCEDURE — 25000003 PHARM REV CODE 250: Mod: HCNC | Performed by: INTERNAL MEDICINE

## 2021-10-16 PROCEDURE — 27000207 HC ISOLATION: Mod: HCNC

## 2021-10-16 PROCEDURE — 36415 COLL VENOUS BLD VENIPUNCTURE: CPT | Mod: HCNC | Performed by: INTERNAL MEDICINE

## 2021-10-16 PROCEDURE — 85025 COMPLETE CBC W/AUTO DIFF WBC: CPT | Mod: HCNC | Performed by: INTERNAL MEDICINE

## 2021-10-16 PROCEDURE — 83735 ASSAY OF MAGNESIUM: CPT | Mod: HCNC | Performed by: INTERNAL MEDICINE

## 2021-10-16 PROCEDURE — 99233 PR SUBSEQUENT HOSPITAL CARE,LEVL III: ICD-10-PCS | Mod: HCNC,,, | Performed by: INTERNAL MEDICINE

## 2021-10-16 PROCEDURE — 36410 VNPNXR 3YR/> PHY/QHP DX/THER: CPT | Mod: HCNC

## 2021-10-16 PROCEDURE — 97530 THERAPEUTIC ACTIVITIES: CPT | Mod: HCNC

## 2021-10-16 PROCEDURE — 21400001 HC TELEMETRY ROOM: Mod: HCNC

## 2021-10-16 PROCEDURE — A4216 STERILE WATER/SALINE, 10 ML: HCPCS | Mod: HCNC | Performed by: INTERNAL MEDICINE

## 2021-10-16 PROCEDURE — 84100 ASSAY OF PHOSPHORUS: CPT | Mod: HCNC | Performed by: INTERNAL MEDICINE

## 2021-10-16 RX ORDER — MONTELUKAST SODIUM 4 MG/1
1 TABLET, CHEWABLE ORAL 2 TIMES DAILY
Status: DISCONTINUED | OUTPATIENT
Start: 2021-10-16 | End: 2021-10-21 | Stop reason: HOSPADM

## 2021-10-16 RX ORDER — SODIUM CHLORIDE 450 MG/100ML
INJECTION, SOLUTION INTRAVENOUS CONTINUOUS
Status: DISCONTINUED | OUTPATIENT
Start: 2021-10-16 | End: 2021-10-21 | Stop reason: HOSPADM

## 2021-10-16 RX ORDER — LOPERAMIDE HYDROCHLORIDE 2 MG/1
2 CAPSULE ORAL 4 TIMES DAILY PRN
Status: DISCONTINUED | OUTPATIENT
Start: 2021-10-16 | End: 2021-10-21 | Stop reason: HOSPADM

## 2021-10-16 RX ORDER — POTASSIUM CHLORIDE 20 MEQ/1
40 TABLET, EXTENDED RELEASE ORAL ONCE
Status: COMPLETED | OUTPATIENT
Start: 2021-10-16 | End: 2021-10-16

## 2021-10-16 RX ADMIN — APIXABAN 5 MG: 2.5 TABLET, FILM COATED ORAL at 09:10

## 2021-10-16 RX ADMIN — MUPIROCIN: 20 OINTMENT TOPICAL at 12:10

## 2021-10-16 RX ADMIN — POTASSIUM CHLORIDE 40 MEQ: 1500 TABLET, EXTENDED RELEASE ORAL at 09:10

## 2021-10-16 RX ADMIN — APIXABAN 5 MG: 2.5 TABLET, FILM COATED ORAL at 08:10

## 2021-10-16 RX ADMIN — SODIUM CHLORIDE: 0.45 INJECTION, SOLUTION INTRAVENOUS at 12:10

## 2021-10-16 RX ADMIN — COLESTIPOL HYDROCHLORIDE 1 G: 1 TABLET, FILM COATED ORAL at 12:10

## 2021-10-16 RX ADMIN — Medication 125 MG: at 05:10

## 2021-10-16 RX ADMIN — COLESTIPOL HYDROCHLORIDE 1 G: 1 TABLET, FILM COATED ORAL at 08:10

## 2021-10-16 RX ADMIN — Medication 125 MG: at 12:10

## 2021-10-16 RX ADMIN — LOPERAMIDE HYDROCHLORIDE 2 MG: 2 CAPSULE ORAL at 08:10

## 2021-10-16 RX ADMIN — CITALOPRAM HYDROBROMIDE 10 MG: 10 TABLET ORAL at 09:10

## 2021-10-16 RX ADMIN — SODIUM CHLORIDE: 0.9 INJECTION, SOLUTION INTRAVENOUS at 12:10

## 2021-10-16 RX ADMIN — ATORVASTATIN CALCIUM 20 MG: 20 TABLET, FILM COATED ORAL at 09:10

## 2021-10-16 RX ADMIN — MUPIROCIN: 20 OINTMENT TOPICAL at 08:10

## 2021-10-16 RX ADMIN — OXYCODONE HYDROCHLORIDE AND ACETAMINOPHEN 1 TABLET: 5; 325 TABLET ORAL at 08:10

## 2021-10-17 LAB
ANION GAP SERPL CALC-SCNC: 11 MMOL/L (ref 8–16)
BASOPHILS # BLD AUTO: 0.04 K/UL (ref 0–0.2)
BASOPHILS NFR BLD: 0.3 % (ref 0–1.9)
BUN SERPL-MCNC: 8 MG/DL (ref 8–23)
CALCIUM SERPL-MCNC: 8.6 MG/DL (ref 8.7–10.5)
CHLORIDE SERPL-SCNC: 114 MMOL/L (ref 95–110)
CO2 SERPL-SCNC: 20 MMOL/L (ref 23–29)
CREAT SERPL-MCNC: 0.6 MG/DL (ref 0.5–1.4)
DIFFERENTIAL METHOD: ABNORMAL
EOSINOPHIL # BLD AUTO: 0 K/UL (ref 0–0.5)
EOSINOPHIL NFR BLD: 0.1 % (ref 0–8)
ERYTHROCYTE [DISTWIDTH] IN BLOOD BY AUTOMATED COUNT: 15.1 % (ref 11.5–14.5)
EST. GFR  (AFRICAN AMERICAN): >60 ML/MIN/1.73 M^2
EST. GFR  (NON AFRICAN AMERICAN): >60 ML/MIN/1.73 M^2
GLUCOSE SERPL-MCNC: 89 MG/DL (ref 70–110)
HCT VFR BLD AUTO: 31.8 % (ref 37–48.5)
HGB BLD-MCNC: 10.1 G/DL (ref 12–16)
IMM GRANULOCYTES # BLD AUTO: 0.27 K/UL (ref 0–0.04)
IMM GRANULOCYTES NFR BLD AUTO: 2.2 % (ref 0–0.5)
LYMPHOCYTES # BLD AUTO: 2.6 K/UL (ref 1–4.8)
LYMPHOCYTES NFR BLD: 21.3 % (ref 18–48)
MAGNESIUM SERPL-MCNC: 1.7 MG/DL (ref 1.6–2.6)
MCH RBC QN AUTO: 30.3 PG (ref 27–31)
MCHC RBC AUTO-ENTMCNC: 31.8 G/DL (ref 32–36)
MCV RBC AUTO: 96 FL (ref 82–98)
MONOCYTES # BLD AUTO: 1.1 K/UL (ref 0.3–1)
MONOCYTES NFR BLD: 9 % (ref 4–15)
NEUTROPHILS # BLD AUTO: 8.3 K/UL (ref 1.8–7.7)
NEUTROPHILS NFR BLD: 67.1 % (ref 38–73)
NRBC BLD-RTO: 0 /100 WBC
PHOSPHATE SERPL-MCNC: 2.9 MG/DL (ref 2.7–4.5)
PLATELET # BLD AUTO: 186 K/UL (ref 150–450)
PMV BLD AUTO: 10.4 FL (ref 9.2–12.9)
POTASSIUM SERPL-SCNC: 3.5 MMOL/L (ref 3.5–5.1)
RBC # BLD AUTO: 3.33 M/UL (ref 4–5.4)
SODIUM SERPL-SCNC: 145 MMOL/L (ref 136–145)
WBC # BLD AUTO: 12.33 K/UL (ref 3.9–12.7)

## 2021-10-17 PROCEDURE — 83735 ASSAY OF MAGNESIUM: CPT | Mod: HCNC | Performed by: INTERNAL MEDICINE

## 2021-10-17 PROCEDURE — 85025 COMPLETE CBC W/AUTO DIFF WBC: CPT | Mod: HCNC | Performed by: INTERNAL MEDICINE

## 2021-10-17 PROCEDURE — 27000207 HC ISOLATION: Mod: HCNC

## 2021-10-17 PROCEDURE — 99233 SBSQ HOSP IP/OBS HIGH 50: CPT | Mod: HCNC,,, | Performed by: INTERNAL MEDICINE

## 2021-10-17 PROCEDURE — 21400001 HC TELEMETRY ROOM: Mod: HCNC

## 2021-10-17 PROCEDURE — 80048 BASIC METABOLIC PNL TOTAL CA: CPT | Mod: HCNC | Performed by: INTERNAL MEDICINE

## 2021-10-17 PROCEDURE — A4216 STERILE WATER/SALINE, 10 ML: HCPCS | Mod: HCNC | Performed by: INTERNAL MEDICINE

## 2021-10-17 PROCEDURE — 99233 PR SUBSEQUENT HOSPITAL CARE,LEVL III: ICD-10-PCS | Mod: HCNC,,, | Performed by: INTERNAL MEDICINE

## 2021-10-17 PROCEDURE — 94761 N-INVAS EAR/PLS OXIMETRY MLT: CPT | Mod: HCNC

## 2021-10-17 PROCEDURE — 25000003 PHARM REV CODE 250: Mod: HCNC | Performed by: INTERNAL MEDICINE

## 2021-10-17 PROCEDURE — 27000221 HC OXYGEN, UP TO 24 HOURS: Mod: HCNC

## 2021-10-17 PROCEDURE — 84100 ASSAY OF PHOSPHORUS: CPT | Mod: HCNC | Performed by: INTERNAL MEDICINE

## 2021-10-17 PROCEDURE — 63600175 PHARM REV CODE 636 W HCPCS: Mod: HCNC | Performed by: INTERNAL MEDICINE

## 2021-10-17 RX ORDER — POTASSIUM CHLORIDE 20 MEQ/1
20 TABLET, EXTENDED RELEASE ORAL ONCE
Status: COMPLETED | OUTPATIENT
Start: 2021-10-17 | End: 2021-10-17

## 2021-10-17 RX ADMIN — APIXABAN 5 MG: 2.5 TABLET, FILM COATED ORAL at 09:10

## 2021-10-17 RX ADMIN — APIXABAN 5 MG: 2.5 TABLET, FILM COATED ORAL at 08:10

## 2021-10-17 RX ADMIN — Medication 125 MG: at 05:10

## 2021-10-17 RX ADMIN — MUPIROCIN: 20 OINTMENT TOPICAL at 08:10

## 2021-10-17 RX ADMIN — Medication 125 MG: at 12:10

## 2021-10-17 RX ADMIN — CITALOPRAM HYDROBROMIDE 10 MG: 10 TABLET ORAL at 08:10

## 2021-10-17 RX ADMIN — COLESTIPOL HYDROCHLORIDE 1 G: 1 TABLET, FILM COATED ORAL at 09:10

## 2021-10-17 RX ADMIN — Medication 125 MG: at 11:10

## 2021-10-17 RX ADMIN — MUPIROCIN: 20 OINTMENT TOPICAL at 09:10

## 2021-10-17 RX ADMIN — COLESTIPOL HYDROCHLORIDE 1 G: 1 TABLET, FILM COATED ORAL at 08:10

## 2021-10-17 RX ADMIN — OXYCODONE HYDROCHLORIDE AND ACETAMINOPHEN 1 TABLET: 5; 325 TABLET ORAL at 09:10

## 2021-10-17 RX ADMIN — ATORVASTATIN CALCIUM 20 MG: 20 TABLET, FILM COATED ORAL at 08:10

## 2021-10-17 RX ADMIN — POTASSIUM CHLORIDE 20 MEQ: 1500 TABLET, EXTENDED RELEASE ORAL at 10:10

## 2021-10-18 LAB
ANION GAP SERPL CALC-SCNC: 11 MMOL/L (ref 8–16)
BACTERIA STL CULT: NORMAL
BASOPHILS # BLD AUTO: 0.05 K/UL (ref 0–0.2)
BASOPHILS NFR BLD: 0.4 % (ref 0–1.9)
BUN SERPL-MCNC: 5 MG/DL (ref 8–23)
CALCIUM SERPL-MCNC: 8.7 MG/DL (ref 8.7–10.5)
CHLORIDE SERPL-SCNC: 110 MMOL/L (ref 95–110)
CO2 SERPL-SCNC: 23 MMOL/L (ref 23–29)
CREAT SERPL-MCNC: 0.6 MG/DL (ref 0.5–1.4)
DIFFERENTIAL METHOD: ABNORMAL
EOSINOPHIL # BLD AUTO: 0 K/UL (ref 0–0.5)
EOSINOPHIL NFR BLD: 0.2 % (ref 0–8)
ERYTHROCYTE [DISTWIDTH] IN BLOOD BY AUTOMATED COUNT: 15.1 % (ref 11.5–14.5)
EST. GFR  (AFRICAN AMERICAN): >60 ML/MIN/1.73 M^2
EST. GFR  (NON AFRICAN AMERICAN): >60 ML/MIN/1.73 M^2
GLUCOSE SERPL-MCNC: 95 MG/DL (ref 70–110)
HCT VFR BLD AUTO: 31.9 % (ref 37–48.5)
HGB BLD-MCNC: 10.1 G/DL (ref 12–16)
IMM GRANULOCYTES # BLD AUTO: 0.58 K/UL (ref 0–0.04)
IMM GRANULOCYTES NFR BLD AUTO: 4.4 % (ref 0–0.5)
LYMPHOCYTES # BLD AUTO: 2.9 K/UL (ref 1–4.8)
LYMPHOCYTES NFR BLD: 22.2 % (ref 18–48)
MAGNESIUM SERPL-MCNC: 1.6 MG/DL (ref 1.6–2.6)
MCH RBC QN AUTO: 30.1 PG (ref 27–31)
MCHC RBC AUTO-ENTMCNC: 31.7 G/DL (ref 32–36)
MCV RBC AUTO: 95 FL (ref 82–98)
MONOCYTES # BLD AUTO: 1.1 K/UL (ref 0.3–1)
MONOCYTES NFR BLD: 8.5 % (ref 4–15)
NEUTROPHILS # BLD AUTO: 8.4 K/UL (ref 1.8–7.7)
NEUTROPHILS NFR BLD: 64.3 % (ref 38–73)
NRBC BLD-RTO: 0 /100 WBC
O+P STL MICRO: NORMAL
PHOSPHATE SERPL-MCNC: 2.7 MG/DL (ref 2.7–4.5)
PLATELET # BLD AUTO: 193 K/UL (ref 150–450)
PMV BLD AUTO: 10 FL (ref 9.2–12.9)
POTASSIUM SERPL-SCNC: 3.6 MMOL/L (ref 3.5–5.1)
RBC # BLD AUTO: 3.36 M/UL (ref 4–5.4)
SODIUM SERPL-SCNC: 144 MMOL/L (ref 136–145)
WBC # BLD AUTO: 13.04 K/UL (ref 3.9–12.7)

## 2021-10-18 PROCEDURE — 84100 ASSAY OF PHOSPHORUS: CPT | Mod: HCNC | Performed by: INTERNAL MEDICINE

## 2021-10-18 PROCEDURE — 63600175 PHARM REV CODE 636 W HCPCS: Mod: HCNC | Performed by: INTERNAL MEDICINE

## 2021-10-18 PROCEDURE — 97535 SELF CARE MNGMENT TRAINING: CPT | Mod: HCNC

## 2021-10-18 PROCEDURE — 27000207 HC ISOLATION: Mod: HCNC

## 2021-10-18 PROCEDURE — 27000221 HC OXYGEN, UP TO 24 HOURS: Mod: HCNC

## 2021-10-18 PROCEDURE — 94761 N-INVAS EAR/PLS OXIMETRY MLT: CPT | Mod: HCNC

## 2021-10-18 PROCEDURE — 85025 COMPLETE CBC W/AUTO DIFF WBC: CPT | Mod: HCNC | Performed by: INTERNAL MEDICINE

## 2021-10-18 PROCEDURE — 99233 PR SUBSEQUENT HOSPITAL CARE,LEVL III: ICD-10-PCS | Mod: HCNC,,, | Performed by: INTERNAL MEDICINE

## 2021-10-18 PROCEDURE — 97166 OT EVAL MOD COMPLEX 45 MIN: CPT | Mod: HCNC

## 2021-10-18 PROCEDURE — 80048 BASIC METABOLIC PNL TOTAL CA: CPT | Mod: HCNC | Performed by: INTERNAL MEDICINE

## 2021-10-18 PROCEDURE — 83735 ASSAY OF MAGNESIUM: CPT | Mod: HCNC | Performed by: INTERNAL MEDICINE

## 2021-10-18 PROCEDURE — 99233 SBSQ HOSP IP/OBS HIGH 50: CPT | Mod: HCNC,,, | Performed by: INTERNAL MEDICINE

## 2021-10-18 PROCEDURE — 21400001 HC TELEMETRY ROOM: Mod: HCNC

## 2021-10-18 PROCEDURE — A4216 STERILE WATER/SALINE, 10 ML: HCPCS | Mod: HCNC | Performed by: INTERNAL MEDICINE

## 2021-10-18 PROCEDURE — 25000003 PHARM REV CODE 250: Mod: HCNC | Performed by: INTERNAL MEDICINE

## 2021-10-18 RX ORDER — POTASSIUM CHLORIDE 20 MEQ/1
20 TABLET, EXTENDED RELEASE ORAL ONCE
Status: COMPLETED | OUTPATIENT
Start: 2021-10-18 | End: 2021-10-18

## 2021-10-18 RX ADMIN — SODIUM CHLORIDE: 0.45 INJECTION, SOLUTION INTRAVENOUS at 08:10

## 2021-10-18 RX ADMIN — POTASSIUM CHLORIDE 20 MEQ: 1500 TABLET, EXTENDED RELEASE ORAL at 05:10

## 2021-10-18 RX ADMIN — MUPIROCIN: 20 OINTMENT TOPICAL at 09:10

## 2021-10-18 RX ADMIN — COLESTIPOL HYDROCHLORIDE 1 G: 1 TABLET, FILM COATED ORAL at 09:10

## 2021-10-18 RX ADMIN — MUPIROCIN: 20 OINTMENT TOPICAL at 08:10

## 2021-10-18 RX ADMIN — SODIUM CHLORIDE: 0.45 INJECTION, SOLUTION INTRAVENOUS at 09:10

## 2021-10-18 RX ADMIN — APIXABAN 5 MG: 2.5 TABLET, FILM COATED ORAL at 09:10

## 2021-10-18 RX ADMIN — COLESTIPOL HYDROCHLORIDE 1 G: 1 TABLET, FILM COATED ORAL at 08:10

## 2021-10-18 RX ADMIN — Medication 125 MG: at 11:10

## 2021-10-18 RX ADMIN — Medication 125 MG: at 12:10

## 2021-10-18 RX ADMIN — Medication 125 MG: at 05:10

## 2021-10-18 RX ADMIN — ATORVASTATIN CALCIUM 20 MG: 20 TABLET, FILM COATED ORAL at 08:10

## 2021-10-18 RX ADMIN — APIXABAN 5 MG: 2.5 TABLET, FILM COATED ORAL at 08:10

## 2021-10-18 RX ADMIN — CITALOPRAM HYDROBROMIDE 10 MG: 10 TABLET ORAL at 08:10

## 2021-10-18 RX ADMIN — OXYCODONE HYDROCHLORIDE AND ACETAMINOPHEN 1 TABLET: 5; 325 TABLET ORAL at 09:10

## 2021-10-19 PROBLEM — N17.9 AKI (ACUTE KIDNEY INJURY): Status: RESOLVED | Noted: 2021-10-15 | Resolved: 2021-10-19

## 2021-10-19 PROBLEM — E87.20 METABOLIC ACIDOSIS: Status: RESOLVED | Noted: 2021-10-15 | Resolved: 2021-10-19

## 2021-10-19 LAB
ANION GAP SERPL CALC-SCNC: 10 MMOL/L (ref 8–16)
ANISOCYTOSIS BLD QL SMEAR: SLIGHT
BACTERIA BLD CULT: NORMAL
BACTERIA BLD CULT: NORMAL
BASOPHILS NFR BLD: 1 % (ref 0–1.9)
BUN SERPL-MCNC: 6 MG/DL (ref 8–23)
CALCIUM SERPL-MCNC: 8.1 MG/DL (ref 8.7–10.5)
CHLORIDE SERPL-SCNC: 111 MMOL/L (ref 95–110)
CO2 SERPL-SCNC: 21 MMOL/L (ref 23–29)
CREAT SERPL-MCNC: 0.5 MG/DL (ref 0.5–1.4)
DIFFERENTIAL METHOD: ABNORMAL
EOSINOPHIL NFR BLD: 0 % (ref 0–8)
ERYTHROCYTE [DISTWIDTH] IN BLOOD BY AUTOMATED COUNT: 15.3 % (ref 11.5–14.5)
EST. GFR  (AFRICAN AMERICAN): >60 ML/MIN/1.73 M^2
EST. GFR  (NON AFRICAN AMERICAN): >60 ML/MIN/1.73 M^2
GLUCOSE SERPL-MCNC: 78 MG/DL (ref 70–110)
HCT VFR BLD AUTO: 30.5 % (ref 37–48.5)
HGB BLD-MCNC: 9.5 G/DL (ref 12–16)
HYPOCHROMIA BLD QL SMEAR: ABNORMAL
IMM GRANULOCYTES # BLD AUTO: ABNORMAL K/UL (ref 0–0.04)
IMM GRANULOCYTES NFR BLD AUTO: ABNORMAL % (ref 0–0.5)
LYMPHOCYTES NFR BLD: 14 % (ref 18–48)
MAGNESIUM SERPL-MCNC: 1.5 MG/DL (ref 1.6–2.6)
MCH RBC QN AUTO: 30 PG (ref 27–31)
MCHC RBC AUTO-ENTMCNC: 31.1 G/DL (ref 32–36)
MCV RBC AUTO: 96 FL (ref 82–98)
METAMYELOCYTES NFR BLD MANUAL: 2 %
MONOCYTES NFR BLD: 5 % (ref 4–15)
MYELOCYTES NFR BLD MANUAL: 5 %
NEUTROPHILS NFR BLD: 72 % (ref 38–73)
NEUTS BAND NFR BLD MANUAL: 1 %
NRBC BLD-RTO: 0 /100 WBC
OVALOCYTES BLD QL SMEAR: ABNORMAL
PHOSPHATE SERPL-MCNC: 2.3 MG/DL (ref 2.7–4.5)
PLATELET # BLD AUTO: 196 K/UL (ref 150–450)
PLATELET BLD QL SMEAR: ABNORMAL
PMV BLD AUTO: 10.3 FL (ref 9.2–12.9)
POIKILOCYTOSIS BLD QL SMEAR: SLIGHT
POLYCHROMASIA BLD QL SMEAR: ABNORMAL
POTASSIUM SERPL-SCNC: 3.2 MMOL/L (ref 3.5–5.1)
RBC # BLD AUTO: 3.17 M/UL (ref 4–5.4)
SCHISTOCYTES BLD QL SMEAR: ABNORMAL
SODIUM SERPL-SCNC: 142 MMOL/L (ref 136–145)
WBC # BLD AUTO: 11.87 K/UL (ref 3.9–12.7)

## 2021-10-19 PROCEDURE — 84100 ASSAY OF PHOSPHORUS: CPT | Mod: HCNC | Performed by: INTERNAL MEDICINE

## 2021-10-19 PROCEDURE — 85027 COMPLETE CBC AUTOMATED: CPT | Mod: HCNC | Performed by: INTERNAL MEDICINE

## 2021-10-19 PROCEDURE — 83735 ASSAY OF MAGNESIUM: CPT | Mod: HCNC | Performed by: INTERNAL MEDICINE

## 2021-10-19 PROCEDURE — 97110 THERAPEUTIC EXERCISES: CPT | Mod: HCNC,CQ

## 2021-10-19 PROCEDURE — 99233 PR SUBSEQUENT HOSPITAL CARE,LEVL III: ICD-10-PCS | Mod: HCNC,,, | Performed by: HOSPITALIST

## 2021-10-19 PROCEDURE — 80048 BASIC METABOLIC PNL TOTAL CA: CPT | Mod: HCNC | Performed by: INTERNAL MEDICINE

## 2021-10-19 PROCEDURE — 25000003 PHARM REV CODE 250: Mod: HCNC | Performed by: INTERNAL MEDICINE

## 2021-10-19 PROCEDURE — 85007 BL SMEAR W/DIFF WBC COUNT: CPT | Mod: HCNC | Performed by: INTERNAL MEDICINE

## 2021-10-19 PROCEDURE — 27000190 HC CPAP FULL FACE MASK W/VALVE: Mod: HCNC

## 2021-10-19 PROCEDURE — 63600175 PHARM REV CODE 636 W HCPCS: Mod: HCNC | Performed by: INTERNAL MEDICINE

## 2021-10-19 PROCEDURE — 99900035 HC TECH TIME PER 15 MIN (STAT): Mod: HCNC

## 2021-10-19 PROCEDURE — 27000221 HC OXYGEN, UP TO 24 HOURS: Mod: HCNC

## 2021-10-19 PROCEDURE — 94660 CPAP INITIATION&MGMT: CPT | Mod: HCNC

## 2021-10-19 PROCEDURE — 94761 N-INVAS EAR/PLS OXIMETRY MLT: CPT | Mod: HCNC

## 2021-10-19 PROCEDURE — A4216 STERILE WATER/SALINE, 10 ML: HCPCS | Mod: HCNC | Performed by: INTERNAL MEDICINE

## 2021-10-19 PROCEDURE — 27000207 HC ISOLATION: Mod: HCNC

## 2021-10-19 PROCEDURE — 25000003 PHARM REV CODE 250: Mod: HCNC | Performed by: NURSE PRACTITIONER

## 2021-10-19 PROCEDURE — 99233 SBSQ HOSP IP/OBS HIGH 50: CPT | Mod: HCNC,,, | Performed by: HOSPITALIST

## 2021-10-19 PROCEDURE — 11000001 HC ACUTE MED/SURG PRIVATE ROOM: Mod: HCNC

## 2021-10-19 PROCEDURE — 97530 THERAPEUTIC ACTIVITIES: CPT | Mod: HCNC,CQ

## 2021-10-19 RX ORDER — GABAPENTIN 300 MG/1
600 CAPSULE ORAL 3 TIMES DAILY
Status: DISCONTINUED | OUTPATIENT
Start: 2021-10-19 | End: 2021-10-21 | Stop reason: HOSPADM

## 2021-10-19 RX ADMIN — Medication 125 MG: at 05:10

## 2021-10-19 RX ADMIN — MUPIROCIN: 20 OINTMENT TOPICAL at 07:10

## 2021-10-19 RX ADMIN — MUPIROCIN: 20 OINTMENT TOPICAL at 11:10

## 2021-10-19 RX ADMIN — GABAPENTIN 600 MG: 300 CAPSULE ORAL at 10:10

## 2021-10-19 RX ADMIN — GABAPENTIN 600 MG: 300 CAPSULE ORAL at 07:10

## 2021-10-19 RX ADMIN — APIXABAN 5 MG: 2.5 TABLET, FILM COATED ORAL at 10:10

## 2021-10-19 RX ADMIN — COLESTIPOL HYDROCHLORIDE 1 G: 1 TABLET, FILM COATED ORAL at 10:10

## 2021-10-19 RX ADMIN — COLESTIPOL HYDROCHLORIDE 1 G: 1 TABLET, FILM COATED ORAL at 07:10

## 2021-10-19 RX ADMIN — APIXABAN 5 MG: 2.5 TABLET, FILM COATED ORAL at 07:10

## 2021-10-19 RX ADMIN — Medication 125 MG: at 11:10

## 2021-10-19 RX ADMIN — ATORVASTATIN CALCIUM 20 MG: 20 TABLET, FILM COATED ORAL at 07:10

## 2021-10-19 RX ADMIN — GABAPENTIN 600 MG: 300 CAPSULE ORAL at 03:10

## 2021-10-19 RX ADMIN — CITALOPRAM HYDROBROMIDE 10 MG: 10 TABLET ORAL at 07:10

## 2021-10-20 VITALS
SYSTOLIC BLOOD PRESSURE: 136 MMHG | HEIGHT: 66 IN | BODY MASS INDEX: 47.09 KG/M2 | OXYGEN SATURATION: 93 % | DIASTOLIC BLOOD PRESSURE: 69 MMHG | HEART RATE: 92 BPM | WEIGHT: 293 LBS | TEMPERATURE: 99 F | RESPIRATION RATE: 19 BRPM

## 2021-10-20 PROCEDURE — A4216 STERILE WATER/SALINE, 10 ML: HCPCS | Mod: HCNC | Performed by: INTERNAL MEDICINE

## 2021-10-20 PROCEDURE — 1111F DSCHRG MED/CURRENT MED MERGE: CPT | Mod: HCNC,CPTII,, | Performed by: HOSPITALIST

## 2021-10-20 PROCEDURE — 97535 SELF CARE MNGMENT TRAINING: CPT | Mod: HCNC

## 2021-10-20 PROCEDURE — 97116 GAIT TRAINING THERAPY: CPT | Mod: HCNC

## 2021-10-20 PROCEDURE — 99900035 HC TECH TIME PER 15 MIN (STAT): Mod: HCNC

## 2021-10-20 PROCEDURE — 25000003 PHARM REV CODE 250: Mod: HCNC | Performed by: NURSE PRACTITIONER

## 2021-10-20 PROCEDURE — 94660 CPAP INITIATION&MGMT: CPT | Mod: HCNC

## 2021-10-20 PROCEDURE — 63600175 PHARM REV CODE 636 W HCPCS: Mod: HCNC | Performed by: INTERNAL MEDICINE

## 2021-10-20 PROCEDURE — 99238 HOSP IP/OBS DSCHRG MGMT 30/<: CPT | Mod: HCNC,,, | Performed by: HOSPITALIST

## 2021-10-20 PROCEDURE — 94761 N-INVAS EAR/PLS OXIMETRY MLT: CPT | Mod: HCNC

## 2021-10-20 PROCEDURE — 99238 PR HOSPITAL DISCHARGE DAY,<30 MIN: ICD-10-PCS | Mod: HCNC,,, | Performed by: HOSPITALIST

## 2021-10-20 PROCEDURE — 25000003 PHARM REV CODE 250: Mod: HCNC | Performed by: INTERNAL MEDICINE

## 2021-10-20 PROCEDURE — 97530 THERAPEUTIC ACTIVITIES: CPT | Mod: HCNC

## 2021-10-20 PROCEDURE — 1111F PR DISCHARGE MEDS RECONCILED W/ CURRENT OUTPATIENT MED LIST: ICD-10-PCS | Mod: HCNC,CPTII,, | Performed by: HOSPITALIST

## 2021-10-20 RX ORDER — POLYETHYLENE GLYCOL 3350 17 G/17G
17 POWDER, FOR SOLUTION ORAL DAILY PRN
Start: 2021-10-20 | End: 2021-12-28

## 2021-10-20 RX ORDER — MONTELUKAST SODIUM 4 MG/1
1 TABLET, CHEWABLE ORAL 2 TIMES DAILY
Qty: 18 TABLET | Refills: 0 | Status: SHIPPED | OUTPATIENT
Start: 2021-10-20 | End: 2021-12-28

## 2021-10-20 RX ADMIN — APIXABAN 5 MG: 2.5 TABLET, FILM COATED ORAL at 08:10

## 2021-10-20 RX ADMIN — ATORVASTATIN CALCIUM 20 MG: 20 TABLET, FILM COATED ORAL at 09:10

## 2021-10-20 RX ADMIN — GABAPENTIN 600 MG: 300 CAPSULE ORAL at 08:10

## 2021-10-20 RX ADMIN — APIXABAN 5 MG: 2.5 TABLET, FILM COATED ORAL at 09:10

## 2021-10-20 RX ADMIN — Medication 125 MG: at 05:10

## 2021-10-20 RX ADMIN — GABAPENTIN 600 MG: 300 CAPSULE ORAL at 09:10

## 2021-10-20 RX ADMIN — CITALOPRAM HYDROBROMIDE 10 MG: 10 TABLET ORAL at 09:10

## 2021-10-20 RX ADMIN — OXYCODONE HYDROCHLORIDE AND ACETAMINOPHEN 1 TABLET: 5; 325 TABLET ORAL at 08:10

## 2021-10-20 RX ADMIN — SODIUM CHLORIDE: 0.45 INJECTION, SOLUTION INTRAVENOUS at 05:10

## 2021-10-20 RX ADMIN — COLESTIPOL HYDROCHLORIDE 1 G: 1 TABLET, FILM COATED ORAL at 09:10

## 2021-10-20 RX ADMIN — COLESTIPOL HYDROCHLORIDE 1 G: 1 TABLET, FILM COATED ORAL at 08:10

## 2021-10-20 RX ADMIN — Medication 125 MG: at 11:10

## 2021-10-20 RX ADMIN — GABAPENTIN 600 MG: 300 CAPSULE ORAL at 04:10

## 2021-10-21 DIAGNOSIS — F43.20 ADJUSTMENT DISORDER, UNSPECIFIED TYPE: ICD-10-CM

## 2021-10-21 DIAGNOSIS — I82.411 DVT OF DEEP FEMORAL VEIN, RIGHT: ICD-10-CM

## 2021-10-21 PROCEDURE — G0180 PR HOME HEALTH MD CERTIFICATION: ICD-10-PCS | Mod: ,,, | Performed by: HOSPITALIST

## 2021-10-21 PROCEDURE — G0180 MD CERTIFICATION HHA PATIENT: HCPCS | Mod: ,,, | Performed by: HOSPITALIST

## 2021-10-21 RX ORDER — CITALOPRAM 10 MG/1
10 TABLET ORAL DAILY
Qty: 30 TABLET | Refills: 0 | Status: SHIPPED | OUTPATIENT
Start: 2021-10-21 | End: 2021-10-26 | Stop reason: SDUPTHER

## 2021-10-21 RX ORDER — CITALOPRAM 10 MG/1
10 TABLET ORAL DAILY
Qty: 30 TABLET | Refills: 0 | OUTPATIENT
Start: 2021-10-21

## 2021-10-22 DIAGNOSIS — G89.29 OTHER CHRONIC PAIN: ICD-10-CM

## 2021-10-22 DIAGNOSIS — G89.4 CHRONIC PAIN DISORDER: ICD-10-CM

## 2021-10-22 DIAGNOSIS — M25.562 CHRONIC PAIN OF LEFT KNEE: ICD-10-CM

## 2021-10-22 DIAGNOSIS — G89.29 CHRONIC PAIN OF LEFT KNEE: ICD-10-CM

## 2021-10-22 RX ORDER — OXYCODONE AND ACETAMINOPHEN 5; 325 MG/1; MG/1
1 TABLET ORAL EVERY 8 HOURS PRN
Qty: 90 TABLET | Refills: 0 | Status: SHIPPED | OUTPATIENT
Start: 2021-10-22 | End: 2021-11-09 | Stop reason: SDUPTHER

## 2021-10-26 ENCOUNTER — OFFICE VISIT (OUTPATIENT)
Dept: INTERNAL MEDICINE | Facility: CLINIC | Age: 71
End: 2021-10-26
Payer: MEDICARE

## 2021-10-26 ENCOUNTER — LAB VISIT (OUTPATIENT)
Dept: LAB | Facility: HOSPITAL | Age: 71
End: 2021-10-26
Attending: INTERNAL MEDICINE
Payer: MEDICARE

## 2021-10-26 VITALS
DIASTOLIC BLOOD PRESSURE: 84 MMHG | OXYGEN SATURATION: 96 % | HEART RATE: 85 BPM | SYSTOLIC BLOOD PRESSURE: 124 MMHG | WEIGHT: 293 LBS | HEIGHT: 66 IN | BODY MASS INDEX: 47.09 KG/M2

## 2021-10-26 DIAGNOSIS — I50.32 CHRONIC DIASTOLIC CONGESTIVE HEART FAILURE: Chronic | ICD-10-CM

## 2021-10-26 DIAGNOSIS — D50.0 IRON DEFICIENCY ANEMIA SECONDARY TO BLOOD LOSS (CHRONIC): ICD-10-CM

## 2021-10-26 DIAGNOSIS — D50.9 IRON DEFICIENCY ANEMIA, UNSPECIFIED IRON DEFICIENCY ANEMIA TYPE: ICD-10-CM

## 2021-10-26 DIAGNOSIS — C16.9 GASTRIC ADENOCARCINOMA: ICD-10-CM

## 2021-10-26 DIAGNOSIS — R06.02 SHORTNESS OF BREATH: ICD-10-CM

## 2021-10-26 DIAGNOSIS — M79.89 SWELLING OF BOTH LOWER EXTREMITIES: ICD-10-CM

## 2021-10-26 DIAGNOSIS — K21.9 GASTROESOPHAGEAL REFLUX DISEASE WITHOUT ESOPHAGITIS: ICD-10-CM

## 2021-10-26 DIAGNOSIS — E03.9 HYPOTHYROIDISM, UNSPECIFIED TYPE: ICD-10-CM

## 2021-10-26 DIAGNOSIS — R53.81 DEBILITY: ICD-10-CM

## 2021-10-26 DIAGNOSIS — I10 ESSENTIAL HYPERTENSION: ICD-10-CM

## 2021-10-26 DIAGNOSIS — F32.0 CURRENT MILD EPISODE OF MAJOR DEPRESSIVE DISORDER WITHOUT PRIOR EPISODE: ICD-10-CM

## 2021-10-26 DIAGNOSIS — F43.20 ADJUSTMENT DISORDER, UNSPECIFIED TYPE: ICD-10-CM

## 2021-10-26 DIAGNOSIS — A04.72 C. DIFFICILE DIARRHEA: Primary | ICD-10-CM

## 2021-10-26 DIAGNOSIS — F32.9 MAJOR DEPRESSIVE DISORDER, REMISSION STATUS UNSPECIFIED, UNSPECIFIED WHETHER RECURRENT: ICD-10-CM

## 2021-10-26 LAB
ALBUMIN SERPL BCP-MCNC: 2.9 G/DL (ref 3.5–5.2)
ALP SERPL-CCNC: 77 U/L (ref 55–135)
ALT SERPL W/O P-5'-P-CCNC: 13 U/L (ref 10–44)
ANION GAP SERPL CALC-SCNC: 9 MMOL/L (ref 8–16)
AST SERPL-CCNC: 22 U/L (ref 10–40)
BASOPHILS # BLD AUTO: 0.04 K/UL (ref 0–0.2)
BASOPHILS NFR BLD: 0.4 % (ref 0–1.9)
BILIRUB SERPL-MCNC: 0.6 MG/DL (ref 0.1–1)
BNP SERPL-MCNC: 23 PG/ML (ref 0–99)
BUN SERPL-MCNC: 14 MG/DL (ref 8–23)
CALCIUM SERPL-MCNC: 9.9 MG/DL (ref 8.7–10.5)
CHLORIDE SERPL-SCNC: 104 MMOL/L (ref 95–110)
CO2 SERPL-SCNC: 25 MMOL/L (ref 23–29)
CREAT SERPL-MCNC: 0.7 MG/DL (ref 0.5–1.4)
DIFFERENTIAL METHOD: ABNORMAL
EOSINOPHIL # BLD AUTO: 0 K/UL (ref 0–0.5)
EOSINOPHIL NFR BLD: 0.3 % (ref 0–8)
ERYTHROCYTE [DISTWIDTH] IN BLOOD BY AUTOMATED COUNT: 17.7 % (ref 11.5–14.5)
EST. GFR  (AFRICAN AMERICAN): >60 ML/MIN/1.73 M^2
EST. GFR  (NON AFRICAN AMERICAN): >60 ML/MIN/1.73 M^2
GLUCOSE SERPL-MCNC: 93 MG/DL (ref 70–110)
HCT VFR BLD AUTO: 32.2 % (ref 37–48.5)
HGB BLD-MCNC: 9.8 G/DL (ref 12–16)
IMM GRANULOCYTES # BLD AUTO: 0.07 K/UL (ref 0–0.04)
IMM GRANULOCYTES NFR BLD AUTO: 0.7 % (ref 0–0.5)
LYMPHOCYTES # BLD AUTO: 2.2 K/UL (ref 1–4.8)
LYMPHOCYTES NFR BLD: 22.3 % (ref 18–48)
MAGNESIUM SERPL-MCNC: 2 MG/DL (ref 1.6–2.6)
MCH RBC QN AUTO: 30.6 PG (ref 27–31)
MCHC RBC AUTO-ENTMCNC: 30.4 G/DL (ref 32–36)
MCV RBC AUTO: 101 FL (ref 82–98)
MONOCYTES # BLD AUTO: 0.9 K/UL (ref 0.3–1)
MONOCYTES NFR BLD: 9.1 % (ref 4–15)
NEUTROPHILS # BLD AUTO: 6.6 K/UL (ref 1.8–7.7)
NEUTROPHILS NFR BLD: 67.2 % (ref 38–73)
NRBC BLD-RTO: 0 /100 WBC
PHOSPHATE SERPL-MCNC: 4 MG/DL (ref 2.7–4.5)
PLATELET # BLD AUTO: 295 K/UL (ref 150–450)
PMV BLD AUTO: 10.6 FL (ref 9.2–12.9)
POTASSIUM SERPL-SCNC: 5.2 MMOL/L (ref 3.5–5.1)
PROT SERPL-MCNC: 5.4 G/DL (ref 6–8.4)
RBC # BLD AUTO: 3.2 M/UL (ref 4–5.4)
SODIUM SERPL-SCNC: 138 MMOL/L (ref 136–145)
TSH SERPL DL<=0.005 MIU/L-ACNC: 0.44 UIU/ML (ref 0.4–4)
WBC # BLD AUTO: 9.79 K/UL (ref 3.9–12.7)

## 2021-10-26 PROCEDURE — 84443 ASSAY THYROID STIM HORMONE: CPT | Mod: HCNC | Performed by: NURSE PRACTITIONER

## 2021-10-26 PROCEDURE — 99499 RISK ADDL DX/OHS AUDIT: ICD-10-PCS | Mod: S$GLB,,, | Performed by: NURSE PRACTITIONER

## 2021-10-26 PROCEDURE — 3288F FALL RISK ASSESSMENT DOCD: CPT | Mod: HCNC,CPTII,S$GLB, | Performed by: NURSE PRACTITIONER

## 2021-10-26 PROCEDURE — 4010F ACE/ARB THERAPY RXD/TAKEN: CPT | Mod: HCNC,CPTII,S$GLB, | Performed by: NURSE PRACTITIONER

## 2021-10-26 PROCEDURE — 3044F HG A1C LEVEL LT 7.0%: CPT | Mod: HCNC,CPTII,S$GLB, | Performed by: NURSE PRACTITIONER

## 2021-10-26 PROCEDURE — 1111F PR DISCHARGE MEDS RECONCILED W/ CURRENT OUTPATIENT MED LIST: ICD-10-PCS | Mod: HCNC,CPTII,S$GLB, | Performed by: NURSE PRACTITIONER

## 2021-10-26 PROCEDURE — 1126F AMNT PAIN NOTED NONE PRSNT: CPT | Mod: HCNC,CPTII,S$GLB, | Performed by: NURSE PRACTITIONER

## 2021-10-26 PROCEDURE — 1111F DSCHRG MED/CURRENT MED MERGE: CPT | Mod: HCNC,CPTII,S$GLB, | Performed by: NURSE PRACTITIONER

## 2021-10-26 PROCEDURE — 80053 COMPREHEN METABOLIC PANEL: CPT | Mod: HCNC | Performed by: NURSE PRACTITIONER

## 2021-10-26 PROCEDURE — 1101F PT FALLS ASSESS-DOCD LE1/YR: CPT | Mod: HCNC,CPTII,S$GLB, | Performed by: NURSE PRACTITIONER

## 2021-10-26 PROCEDURE — 99214 PR OFFICE/OUTPT VISIT, EST, LEVL IV, 30-39 MIN: ICD-10-PCS | Mod: HCNC,S$GLB,, | Performed by: NURSE PRACTITIONER

## 2021-10-26 PROCEDURE — 3074F PR MOST RECENT SYSTOLIC BLOOD PRESSURE < 130 MM HG: ICD-10-PCS | Mod: HCNC,CPTII,S$GLB, | Performed by: NURSE PRACTITIONER

## 2021-10-26 PROCEDURE — 1101F PR PT FALLS ASSESS DOC 0-1 FALLS W/OUT INJ PAST YR: ICD-10-PCS | Mod: HCNC,CPTII,S$GLB, | Performed by: NURSE PRACTITIONER

## 2021-10-26 PROCEDURE — 83880 ASSAY OF NATRIURETIC PEPTIDE: CPT | Mod: HCNC | Performed by: NURSE PRACTITIONER

## 2021-10-26 PROCEDURE — 99999 PR PBB SHADOW E&M-EST. PATIENT-LVL IV: CPT | Mod: PBBFAC,HCNC,, | Performed by: NURSE PRACTITIONER

## 2021-10-26 PROCEDURE — 1126F PR PAIN SEVERITY QUANTIFIED, NO PAIN PRESENT: ICD-10-PCS | Mod: HCNC,CPTII,S$GLB, | Performed by: NURSE PRACTITIONER

## 2021-10-26 PROCEDURE — 3074F SYST BP LT 130 MM HG: CPT | Mod: HCNC,CPTII,S$GLB, | Performed by: NURSE PRACTITIONER

## 2021-10-26 PROCEDURE — 3008F PR BODY MASS INDEX (BMI) DOCUMENTED: ICD-10-PCS | Mod: HCNC,CPTII,S$GLB, | Performed by: NURSE PRACTITIONER

## 2021-10-26 PROCEDURE — 3079F DIAST BP 80-89 MM HG: CPT | Mod: HCNC,CPTII,S$GLB, | Performed by: NURSE PRACTITIONER

## 2021-10-26 PROCEDURE — 84100 ASSAY OF PHOSPHORUS: CPT | Mod: HCNC | Performed by: NURSE PRACTITIONER

## 2021-10-26 PROCEDURE — 83735 ASSAY OF MAGNESIUM: CPT | Mod: HCNC | Performed by: NURSE PRACTITIONER

## 2021-10-26 PROCEDURE — 3044F PR MOST RECENT HEMOGLOBIN A1C LEVEL <7.0%: ICD-10-PCS | Mod: HCNC,CPTII,S$GLB, | Performed by: NURSE PRACTITIONER

## 2021-10-26 PROCEDURE — 36415 COLL VENOUS BLD VENIPUNCTURE: CPT | Mod: HCNC | Performed by: NURSE PRACTITIONER

## 2021-10-26 PROCEDURE — 85025 COMPLETE CBC W/AUTO DIFF WBC: CPT | Mod: HCNC | Performed by: NURSE PRACTITIONER

## 2021-10-26 PROCEDURE — 99214 OFFICE O/P EST MOD 30 MIN: CPT | Mod: HCNC,S$GLB,, | Performed by: NURSE PRACTITIONER

## 2021-10-26 PROCEDURE — 4010F PR ACE/ARB THEARPY RXD/TAKEN: ICD-10-PCS | Mod: HCNC,CPTII,S$GLB, | Performed by: NURSE PRACTITIONER

## 2021-10-26 PROCEDURE — 3008F BODY MASS INDEX DOCD: CPT | Mod: HCNC,CPTII,S$GLB, | Performed by: NURSE PRACTITIONER

## 2021-10-26 PROCEDURE — 3079F PR MOST RECENT DIASTOLIC BLOOD PRESSURE 80-89 MM HG: ICD-10-PCS | Mod: HCNC,CPTII,S$GLB, | Performed by: NURSE PRACTITIONER

## 2021-10-26 PROCEDURE — 1159F MED LIST DOCD IN RCRD: CPT | Mod: HCNC,CPTII,S$GLB, | Performed by: NURSE PRACTITIONER

## 2021-10-26 PROCEDURE — 1159F PR MEDICATION LIST DOCUMENTED IN MEDICAL RECORD: ICD-10-PCS | Mod: HCNC,CPTII,S$GLB, | Performed by: NURSE PRACTITIONER

## 2021-10-26 PROCEDURE — 99499 UNLISTED E&M SERVICE: CPT | Mod: S$GLB,,, | Performed by: NURSE PRACTITIONER

## 2021-10-26 PROCEDURE — 99999 PR PBB SHADOW E&M-EST. PATIENT-LVL IV: ICD-10-PCS | Mod: PBBFAC,HCNC,, | Performed by: NURSE PRACTITIONER

## 2021-10-26 PROCEDURE — 3288F PR FALLS RISK ASSESSMENT DOCUMENTED: ICD-10-PCS | Mod: HCNC,CPTII,S$GLB, | Performed by: NURSE PRACTITIONER

## 2021-10-26 RX ORDER — CITALOPRAM 10 MG/1
10 TABLET ORAL DAILY
Qty: 90 TABLET | Refills: 3 | Status: SHIPPED | OUTPATIENT
Start: 2021-10-26 | End: 2022-08-29

## 2021-10-28 ENCOUNTER — TELEPHONE (OUTPATIENT)
Dept: SURGERY | Facility: CLINIC | Age: 71
End: 2021-10-28
Payer: MEDICARE

## 2021-11-01 ENCOUNTER — EXTERNAL HOME HEALTH (OUTPATIENT)
Dept: HOME HEALTH SERVICES | Facility: HOSPITAL | Age: 71
End: 2021-11-01
Payer: MEDICARE

## 2021-11-03 ENCOUNTER — TELEPHONE (OUTPATIENT)
Dept: HEMATOLOGY/ONCOLOGY | Facility: CLINIC | Age: 71
End: 2021-11-03
Payer: MEDICARE

## 2021-11-04 ENCOUNTER — OFFICE VISIT (OUTPATIENT)
Dept: SURGERY | Facility: CLINIC | Age: 71
End: 2021-11-04
Payer: MEDICARE

## 2021-11-04 VITALS
HEIGHT: 66 IN | TEMPERATURE: 97 F | HEART RATE: 79 BPM | SYSTOLIC BLOOD PRESSURE: 104 MMHG | WEIGHT: 293 LBS | DIASTOLIC BLOOD PRESSURE: 66 MMHG | BODY MASS INDEX: 47.09 KG/M2

## 2021-11-04 DIAGNOSIS — C16.9 GASTRIC ADENOCARCINOMA: Primary | ICD-10-CM

## 2021-11-04 PROCEDURE — 3008F BODY MASS INDEX DOCD: CPT | Mod: HCNC,CPTII,S$GLB, | Performed by: NURSE PRACTITIONER

## 2021-11-04 PROCEDURE — 1126F PR PAIN SEVERITY QUANTIFIED, NO PAIN PRESENT: ICD-10-PCS | Mod: HCNC,CPTII,S$GLB, | Performed by: NURSE PRACTITIONER

## 2021-11-04 PROCEDURE — 3074F PR MOST RECENT SYSTOLIC BLOOD PRESSURE < 130 MM HG: ICD-10-PCS | Mod: HCNC,CPTII,S$GLB, | Performed by: NURSE PRACTITIONER

## 2021-11-04 PROCEDURE — 1159F MED LIST DOCD IN RCRD: CPT | Mod: HCNC,CPTII,S$GLB, | Performed by: NURSE PRACTITIONER

## 2021-11-04 PROCEDURE — 3044F HG A1C LEVEL LT 7.0%: CPT | Mod: HCNC,CPTII,S$GLB, | Performed by: NURSE PRACTITIONER

## 2021-11-04 PROCEDURE — 1159F PR MEDICATION LIST DOCUMENTED IN MEDICAL RECORD: ICD-10-PCS | Mod: HCNC,CPTII,S$GLB, | Performed by: NURSE PRACTITIONER

## 2021-11-04 PROCEDURE — 3078F PR MOST RECENT DIASTOLIC BLOOD PRESSURE < 80 MM HG: ICD-10-PCS | Mod: HCNC,CPTII,S$GLB, | Performed by: NURSE PRACTITIONER

## 2021-11-04 PROCEDURE — 3074F SYST BP LT 130 MM HG: CPT | Mod: HCNC,CPTII,S$GLB, | Performed by: NURSE PRACTITIONER

## 2021-11-04 PROCEDURE — 1101F PR PT FALLS ASSESS DOC 0-1 FALLS W/OUT INJ PAST YR: ICD-10-PCS | Mod: HCNC,CPTII,S$GLB, | Performed by: NURSE PRACTITIONER

## 2021-11-04 PROCEDURE — 4010F PR ACE/ARB THEARPY RXD/TAKEN: ICD-10-PCS | Mod: HCNC,CPTII,S$GLB, | Performed by: NURSE PRACTITIONER

## 2021-11-04 PROCEDURE — 3078F DIAST BP <80 MM HG: CPT | Mod: HCNC,CPTII,S$GLB, | Performed by: NURSE PRACTITIONER

## 2021-11-04 PROCEDURE — 3288F PR FALLS RISK ASSESSMENT DOCUMENTED: ICD-10-PCS | Mod: HCNC,CPTII,S$GLB, | Performed by: NURSE PRACTITIONER

## 2021-11-04 PROCEDURE — 99999 PR PBB SHADOW E&M-EST. PATIENT-LVL IV: CPT | Mod: PBBFAC,HCNC,, | Performed by: NURSE PRACTITIONER

## 2021-11-04 PROCEDURE — 3288F FALL RISK ASSESSMENT DOCD: CPT | Mod: HCNC,CPTII,S$GLB, | Performed by: NURSE PRACTITIONER

## 2021-11-04 PROCEDURE — 99214 OFFICE O/P EST MOD 30 MIN: CPT | Mod: HCNC,S$GLB,, | Performed by: NURSE PRACTITIONER

## 2021-11-04 PROCEDURE — 3008F PR BODY MASS INDEX (BMI) DOCUMENTED: ICD-10-PCS | Mod: HCNC,CPTII,S$GLB, | Performed by: NURSE PRACTITIONER

## 2021-11-04 PROCEDURE — 4010F ACE/ARB THERAPY RXD/TAKEN: CPT | Mod: HCNC,CPTII,S$GLB, | Performed by: NURSE PRACTITIONER

## 2021-11-04 PROCEDURE — 99214 PR OFFICE/OUTPT VISIT, EST, LEVL IV, 30-39 MIN: ICD-10-PCS | Mod: HCNC,S$GLB,, | Performed by: NURSE PRACTITIONER

## 2021-11-04 PROCEDURE — 3044F PR MOST RECENT HEMOGLOBIN A1C LEVEL <7.0%: ICD-10-PCS | Mod: HCNC,CPTII,S$GLB, | Performed by: NURSE PRACTITIONER

## 2021-11-04 PROCEDURE — 1126F AMNT PAIN NOTED NONE PRSNT: CPT | Mod: HCNC,CPTII,S$GLB, | Performed by: NURSE PRACTITIONER

## 2021-11-04 PROCEDURE — 99999 PR PBB SHADOW E&M-EST. PATIENT-LVL IV: ICD-10-PCS | Mod: PBBFAC,HCNC,, | Performed by: NURSE PRACTITIONER

## 2021-11-04 PROCEDURE — 1101F PT FALLS ASSESS-DOCD LE1/YR: CPT | Mod: HCNC,CPTII,S$GLB, | Performed by: NURSE PRACTITIONER

## 2021-11-09 ENCOUNTER — OFFICE VISIT (OUTPATIENT)
Dept: PAIN MEDICINE | Facility: CLINIC | Age: 71
End: 2021-11-09
Payer: MEDICARE

## 2021-11-09 ENCOUNTER — TELEPHONE (OUTPATIENT)
Dept: PAIN MEDICINE | Facility: CLINIC | Age: 71
End: 2021-11-09
Payer: MEDICARE

## 2021-11-09 VITALS
DIASTOLIC BLOOD PRESSURE: 59 MMHG | HEIGHT: 66 IN | SYSTOLIC BLOOD PRESSURE: 103 MMHG | RESPIRATION RATE: 20 BRPM | WEIGHT: 293 LBS | TEMPERATURE: 98 F | HEART RATE: 77 BPM | BODY MASS INDEX: 47.09 KG/M2

## 2021-11-09 DIAGNOSIS — G89.29 CHRONIC PAIN OF LEFT KNEE: ICD-10-CM

## 2021-11-09 DIAGNOSIS — M25.562 CHRONIC PAIN OF LEFT KNEE: ICD-10-CM

## 2021-11-09 DIAGNOSIS — G89.4 CHRONIC PAIN DISORDER: ICD-10-CM

## 2021-11-09 DIAGNOSIS — G89.4 CHRONIC PAIN DISORDER: Primary | ICD-10-CM

## 2021-11-09 DIAGNOSIS — M17.12 PRIMARY OSTEOARTHRITIS OF LEFT KNEE: ICD-10-CM

## 2021-11-09 DIAGNOSIS — G89.29 OTHER CHRONIC PAIN: ICD-10-CM

## 2021-11-09 DIAGNOSIS — C16.9 GASTRIC ADENOCARCINOMA: ICD-10-CM

## 2021-11-09 DIAGNOSIS — M17.12 PRIMARY OSTEOARTHRITIS OF LEFT KNEE: Primary | ICD-10-CM

## 2021-11-09 PROCEDURE — 1159F MED LIST DOCD IN RCRD: CPT | Mod: HCNC,CPTII,S$GLB, | Performed by: NURSE PRACTITIONER

## 2021-11-09 PROCEDURE — 1111F PR DISCHARGE MEDS RECONCILED W/ CURRENT OUTPATIENT MED LIST: ICD-10-PCS | Mod: HCNC,CPTII,S$GLB, | Performed by: NURSE PRACTITIONER

## 2021-11-09 PROCEDURE — 99999 PR PBB SHADOW E&M-EST. PATIENT-LVL IV: ICD-10-PCS | Mod: PBBFAC,HCNC,, | Performed by: NURSE PRACTITIONER

## 2021-11-09 PROCEDURE — 1159F PR MEDICATION LIST DOCUMENTED IN MEDICAL RECORD: ICD-10-PCS | Mod: HCNC,CPTII,S$GLB, | Performed by: NURSE PRACTITIONER

## 2021-11-09 PROCEDURE — 3044F HG A1C LEVEL LT 7.0%: CPT | Mod: HCNC,CPTII,S$GLB, | Performed by: NURSE PRACTITIONER

## 2021-11-09 PROCEDURE — 3074F PR MOST RECENT SYSTOLIC BLOOD PRESSURE < 130 MM HG: ICD-10-PCS | Mod: HCNC,CPTII,S$GLB, | Performed by: NURSE PRACTITIONER

## 2021-11-09 PROCEDURE — 3008F BODY MASS INDEX DOCD: CPT | Mod: HCNC,CPTII,S$GLB, | Performed by: NURSE PRACTITIONER

## 2021-11-09 PROCEDURE — 3078F DIAST BP <80 MM HG: CPT | Mod: HCNC,CPTII,S$GLB, | Performed by: NURSE PRACTITIONER

## 2021-11-09 PROCEDURE — 99214 OFFICE O/P EST MOD 30 MIN: CPT | Mod: HCNC,S$GLB,, | Performed by: NURSE PRACTITIONER

## 2021-11-09 PROCEDURE — 1101F PR PT FALLS ASSESS DOC 0-1 FALLS W/OUT INJ PAST YR: ICD-10-PCS | Mod: HCNC,CPTII,S$GLB, | Performed by: NURSE PRACTITIONER

## 2021-11-09 PROCEDURE — 99999 PR PBB SHADOW E&M-EST. PATIENT-LVL IV: CPT | Mod: PBBFAC,HCNC,, | Performed by: NURSE PRACTITIONER

## 2021-11-09 PROCEDURE — 4010F ACE/ARB THERAPY RXD/TAKEN: CPT | Mod: HCNC,CPTII,S$GLB, | Performed by: NURSE PRACTITIONER

## 2021-11-09 PROCEDURE — 1160F RVW MEDS BY RX/DR IN RCRD: CPT | Mod: HCNC,CPTII,S$GLB, | Performed by: NURSE PRACTITIONER

## 2021-11-09 PROCEDURE — 1125F AMNT PAIN NOTED PAIN PRSNT: CPT | Mod: HCNC,CPTII,S$GLB, | Performed by: NURSE PRACTITIONER

## 2021-11-09 PROCEDURE — 3288F FALL RISK ASSESSMENT DOCD: CPT | Mod: HCNC,CPTII,S$GLB, | Performed by: NURSE PRACTITIONER

## 2021-11-09 PROCEDURE — 1125F PR PAIN SEVERITY QUANTIFIED, PAIN PRESENT: ICD-10-PCS | Mod: HCNC,CPTII,S$GLB, | Performed by: NURSE PRACTITIONER

## 2021-11-09 PROCEDURE — 3288F PR FALLS RISK ASSESSMENT DOCUMENTED: ICD-10-PCS | Mod: HCNC,CPTII,S$GLB, | Performed by: NURSE PRACTITIONER

## 2021-11-09 PROCEDURE — 99214 PR OFFICE/OUTPT VISIT, EST, LEVL IV, 30-39 MIN: ICD-10-PCS | Mod: HCNC,S$GLB,, | Performed by: NURSE PRACTITIONER

## 2021-11-09 PROCEDURE — 3074F SYST BP LT 130 MM HG: CPT | Mod: HCNC,CPTII,S$GLB, | Performed by: NURSE PRACTITIONER

## 2021-11-09 PROCEDURE — 4010F PR ACE/ARB THEARPY RXD/TAKEN: ICD-10-PCS | Mod: HCNC,CPTII,S$GLB, | Performed by: NURSE PRACTITIONER

## 2021-11-09 PROCEDURE — 3078F PR MOST RECENT DIASTOLIC BLOOD PRESSURE < 80 MM HG: ICD-10-PCS | Mod: HCNC,CPTII,S$GLB, | Performed by: NURSE PRACTITIONER

## 2021-11-09 PROCEDURE — 1101F PT FALLS ASSESS-DOCD LE1/YR: CPT | Mod: HCNC,CPTII,S$GLB, | Performed by: NURSE PRACTITIONER

## 2021-11-09 PROCEDURE — 1160F PR REVIEW ALL MEDS BY PRESCRIBER/CLIN PHARMACIST DOCUMENTED: ICD-10-PCS | Mod: HCNC,CPTII,S$GLB, | Performed by: NURSE PRACTITIONER

## 2021-11-09 PROCEDURE — 1111F DSCHRG MED/CURRENT MED MERGE: CPT | Mod: HCNC,CPTII,S$GLB, | Performed by: NURSE PRACTITIONER

## 2021-11-09 PROCEDURE — 3008F PR BODY MASS INDEX (BMI) DOCUMENTED: ICD-10-PCS | Mod: HCNC,CPTII,S$GLB, | Performed by: NURSE PRACTITIONER

## 2021-11-09 PROCEDURE — 3044F PR MOST RECENT HEMOGLOBIN A1C LEVEL <7.0%: ICD-10-PCS | Mod: HCNC,CPTII,S$GLB, | Performed by: NURSE PRACTITIONER

## 2021-11-09 RX ORDER — OXYCODONE AND ACETAMINOPHEN 5; 325 MG/1; MG/1
1 TABLET ORAL EVERY 8 HOURS PRN
Qty: 90 TABLET | Refills: 0 | Status: SHIPPED | OUTPATIENT
Start: 2021-11-21 | End: 2021-11-22 | Stop reason: SDUPTHER

## 2021-11-09 RX ORDER — GABAPENTIN 300 MG/1
600 CAPSULE ORAL 3 TIMES DAILY
Qty: 540 CAPSULE | Refills: 2 | Status: SHIPPED | OUTPATIENT
Start: 2021-11-09 | End: 2022-08-23 | Stop reason: SDUPTHER

## 2021-11-10 ENCOUNTER — TELEPHONE (OUTPATIENT)
Dept: HEMATOLOGY/ONCOLOGY | Facility: CLINIC | Age: 71
End: 2021-11-10
Payer: MEDICARE

## 2021-11-15 ENCOUNTER — HOSPITAL ENCOUNTER (OUTPATIENT)
Dept: RADIOLOGY | Facility: OTHER | Age: 71
Discharge: HOME | End: 2021-11-15
Attending: NURSE PRACTITIONER
Payer: MEDICARE

## 2021-11-15 ENCOUNTER — INFUSION (OUTPATIENT)
Dept: INFUSION THERAPY | Facility: OTHER | Age: 71
End: 2021-11-15
Attending: STUDENT IN AN ORGANIZED HEALTH CARE EDUCATION/TRAINING PROGRAM
Payer: MEDICARE

## 2021-11-15 ENCOUNTER — OFFICE VISIT (OUTPATIENT)
Dept: HEMATOLOGY/ONCOLOGY | Facility: CLINIC | Age: 71
End: 2021-11-15
Payer: MEDICARE

## 2021-11-15 ENCOUNTER — LAB VISIT (OUTPATIENT)
Dept: LAB | Facility: OTHER | Age: 71
End: 2021-11-15
Attending: STUDENT IN AN ORGANIZED HEALTH CARE EDUCATION/TRAINING PROGRAM
Payer: MEDICARE

## 2021-11-15 VITALS
OXYGEN SATURATION: 96 % | WEIGHT: 293 LBS | TEMPERATURE: 98 F | SYSTOLIC BLOOD PRESSURE: 133 MMHG | BODY MASS INDEX: 47.09 KG/M2 | DIASTOLIC BLOOD PRESSURE: 68 MMHG | HEART RATE: 76 BPM | HEIGHT: 66 IN | RESPIRATION RATE: 17 BRPM

## 2021-11-15 DIAGNOSIS — E66.01 MORBID OBESITY: ICD-10-CM

## 2021-11-15 DIAGNOSIS — C16.9 GASTRIC ADENOCARCINOMA: Primary | ICD-10-CM

## 2021-11-15 DIAGNOSIS — C16.9 GASTRIC ADENOCARCINOMA: ICD-10-CM

## 2021-11-15 DIAGNOSIS — D84.81 IMMUNODEFICIENCY DUE TO CONDITIONS CLASSIFIED ELSEWHERE: ICD-10-CM

## 2021-11-15 DIAGNOSIS — M25.562 CHRONIC PAIN OF LEFT KNEE: ICD-10-CM

## 2021-11-15 DIAGNOSIS — I10 ESSENTIAL HYPERTENSION: ICD-10-CM

## 2021-11-15 DIAGNOSIS — I82.411 DVT OF DEEP FEMORAL VEIN, RIGHT: ICD-10-CM

## 2021-11-15 DIAGNOSIS — G89.29 CHRONIC PAIN OF LEFT KNEE: ICD-10-CM

## 2021-11-15 DIAGNOSIS — I50.32 CHRONIC DIASTOLIC CONGESTIVE HEART FAILURE: Chronic | ICD-10-CM

## 2021-11-15 LAB
ALBUMIN SERPL BCP-MCNC: 2.7 G/DL (ref 3.5–5.2)
ALP SERPL-CCNC: 102 U/L (ref 55–135)
ALT SERPL W/O P-5'-P-CCNC: 9 U/L (ref 10–44)
ANION GAP SERPL CALC-SCNC: 11 MMOL/L (ref 8–16)
AST SERPL-CCNC: 18 U/L (ref 10–40)
BASOPHILS # BLD AUTO: 0.02 K/UL (ref 0–0.2)
BASOPHILS NFR BLD: 0.2 % (ref 0–1.9)
BILIRUB SERPL-MCNC: 0.7 MG/DL (ref 0.1–1)
BUN SERPL-MCNC: 12 MG/DL (ref 8–23)
CALCIUM SERPL-MCNC: 9.1 MG/DL (ref 8.7–10.5)
CHLORIDE SERPL-SCNC: 107 MMOL/L (ref 95–110)
CO2 SERPL-SCNC: 24 MMOL/L (ref 23–29)
CREAT SERPL-MCNC: 0.8 MG/DL (ref 0.5–1.4)
DIFFERENTIAL METHOD: ABNORMAL
EOSINOPHIL # BLD AUTO: 0.1 K/UL (ref 0–0.5)
EOSINOPHIL NFR BLD: 0.7 % (ref 0–8)
ERYTHROCYTE [DISTWIDTH] IN BLOOD BY AUTOMATED COUNT: 15.2 % (ref 11.5–14.5)
EST. GFR  (AFRICAN AMERICAN): >60 ML/MIN/1.73 M^2
EST. GFR  (NON AFRICAN AMERICAN): >60 ML/MIN/1.73 M^2
GLUCOSE SERPL-MCNC: 94 MG/DL (ref 70–110)
HCT VFR BLD AUTO: 37.5 % (ref 37–48.5)
HGB BLD-MCNC: 11.3 G/DL (ref 12–16)
IMM GRANULOCYTES # BLD AUTO: 0.09 K/UL (ref 0–0.04)
IMM GRANULOCYTES NFR BLD AUTO: 0.9 % (ref 0–0.5)
LYMPHOCYTES # BLD AUTO: 2.5 K/UL (ref 1–4.8)
LYMPHOCYTES NFR BLD: 24.3 % (ref 18–48)
MAGNESIUM SERPL-MCNC: 2 MG/DL (ref 1.6–2.6)
MCH RBC QN AUTO: 30.3 PG (ref 27–31)
MCHC RBC AUTO-ENTMCNC: 30.1 G/DL (ref 32–36)
MCV RBC AUTO: 101 FL (ref 82–98)
MONOCYTES # BLD AUTO: 0.8 K/UL (ref 0.3–1)
MONOCYTES NFR BLD: 7.6 % (ref 4–15)
NEUTROPHILS # BLD AUTO: 6.9 K/UL (ref 1.8–7.7)
NEUTROPHILS NFR BLD: 66.3 % (ref 38–73)
NRBC BLD-RTO: 0 /100 WBC
PLATELET # BLD AUTO: 284 K/UL (ref 150–450)
PMV BLD AUTO: 10.4 FL (ref 9.2–12.9)
POTASSIUM SERPL-SCNC: 4.5 MMOL/L (ref 3.5–5.1)
PROT SERPL-MCNC: 6 G/DL (ref 6–8.4)
RBC # BLD AUTO: 3.73 M/UL (ref 4–5.4)
SODIUM SERPL-SCNC: 142 MMOL/L (ref 136–145)
WBC # BLD AUTO: 10.4 K/UL (ref 3.9–12.7)

## 2021-11-15 PROCEDURE — 25500020 PHARM REV CODE 255: Mod: HCNC | Performed by: NURSE PRACTITIONER

## 2021-11-15 PROCEDURE — 96523 IRRIG DRUG DELIVERY DEVICE: CPT

## 2021-11-15 PROCEDURE — 99999 PR PBB SHADOW E&M-EST. PATIENT-LVL IV: CPT | Mod: PBBFAC,HCNC,, | Performed by: STUDENT IN AN ORGANIZED HEALTH CARE EDUCATION/TRAINING PROGRAM

## 2021-11-15 PROCEDURE — 74177 CT ABD & PELVIS W/CONTRAST: CPT | Mod: TC,HCNC

## 2021-11-15 PROCEDURE — 85025 COMPLETE CBC W/AUTO DIFF WBC: CPT | Performed by: STUDENT IN AN ORGANIZED HEALTH CARE EDUCATION/TRAINING PROGRAM

## 2021-11-15 PROCEDURE — 3075F PR MOST RECENT SYSTOLIC BLOOD PRESS GE 130-139MM HG: ICD-10-PCS | Mod: HCNC,CPTII,S$GLB, | Performed by: STUDENT IN AN ORGANIZED HEALTH CARE EDUCATION/TRAINING PROGRAM

## 2021-11-15 PROCEDURE — 3044F PR MOST RECENT HEMOGLOBIN A1C LEVEL <7.0%: ICD-10-PCS | Mod: HCNC,CPTII,S$GLB, | Performed by: STUDENT IN AN ORGANIZED HEALTH CARE EDUCATION/TRAINING PROGRAM

## 2021-11-15 PROCEDURE — 25000003 PHARM REV CODE 250: Performed by: STUDENT IN AN ORGANIZED HEALTH CARE EDUCATION/TRAINING PROGRAM

## 2021-11-15 PROCEDURE — 1101F PR PT FALLS ASSESS DOC 0-1 FALLS W/OUT INJ PAST YR: ICD-10-PCS | Mod: HCNC,CPTII,S$GLB, | Performed by: STUDENT IN AN ORGANIZED HEALTH CARE EDUCATION/TRAINING PROGRAM

## 2021-11-15 PROCEDURE — 3008F PR BODY MASS INDEX (BMI) DOCUMENTED: ICD-10-PCS | Mod: HCNC,CPTII,S$GLB, | Performed by: STUDENT IN AN ORGANIZED HEALTH CARE EDUCATION/TRAINING PROGRAM

## 2021-11-15 PROCEDURE — 74177 CT ABD & PELVIS W/CONTRAST: CPT | Mod: 26,HCNC,, | Performed by: RADIOLOGY

## 2021-11-15 PROCEDURE — 3078F PR MOST RECENT DIASTOLIC BLOOD PRESSURE < 80 MM HG: ICD-10-PCS | Mod: HCNC,CPTII,S$GLB, | Performed by: STUDENT IN AN ORGANIZED HEALTH CARE EDUCATION/TRAINING PROGRAM

## 2021-11-15 PROCEDURE — 4010F ACE/ARB THERAPY RXD/TAKEN: CPT | Mod: HCNC,CPTII,S$GLB, | Performed by: STUDENT IN AN ORGANIZED HEALTH CARE EDUCATION/TRAINING PROGRAM

## 2021-11-15 PROCEDURE — 99214 PR OFFICE/OUTPT VISIT, EST, LEVL IV, 30-39 MIN: ICD-10-PCS | Mod: HCNC,S$GLB,, | Performed by: STUDENT IN AN ORGANIZED HEALTH CARE EDUCATION/TRAINING PROGRAM

## 2021-11-15 PROCEDURE — 99499 UNLISTED E&M SERVICE: CPT | Mod: S$GLB,,, | Performed by: STUDENT IN AN ORGANIZED HEALTH CARE EDUCATION/TRAINING PROGRAM

## 2021-11-15 PROCEDURE — A4216 STERILE WATER/SALINE, 10 ML: HCPCS | Performed by: STUDENT IN AN ORGANIZED HEALTH CARE EDUCATION/TRAINING PROGRAM

## 2021-11-15 PROCEDURE — A9698 NON-RAD CONTRAST MATERIALNOC: HCPCS | Mod: HCNC | Performed by: NURSE PRACTITIONER

## 2021-11-15 PROCEDURE — 63600175 PHARM REV CODE 636 W HCPCS: Performed by: STUDENT IN AN ORGANIZED HEALTH CARE EDUCATION/TRAINING PROGRAM

## 2021-11-15 PROCEDURE — 3075F SYST BP GE 130 - 139MM HG: CPT | Mod: HCNC,CPTII,S$GLB, | Performed by: STUDENT IN AN ORGANIZED HEALTH CARE EDUCATION/TRAINING PROGRAM

## 2021-11-15 PROCEDURE — 71260 CT CHEST ABDOMEN PELVIS WITH CONTRAST (XPD): ICD-10-PCS | Mod: 26,HCNC,, | Performed by: RADIOLOGY

## 2021-11-15 PROCEDURE — 99999 PR PBB SHADOW E&M-EST. PATIENT-LVL IV: ICD-10-PCS | Mod: PBBFAC,HCNC,, | Performed by: STUDENT IN AN ORGANIZED HEALTH CARE EDUCATION/TRAINING PROGRAM

## 2021-11-15 PROCEDURE — 3288F FALL RISK ASSESSMENT DOCD: CPT | Mod: HCNC,CPTII,S$GLB, | Performed by: STUDENT IN AN ORGANIZED HEALTH CARE EDUCATION/TRAINING PROGRAM

## 2021-11-15 PROCEDURE — 71260 CT THORAX DX C+: CPT | Mod: 26,HCNC,, | Performed by: RADIOLOGY

## 2021-11-15 PROCEDURE — 3008F BODY MASS INDEX DOCD: CPT | Mod: HCNC,CPTII,S$GLB, | Performed by: STUDENT IN AN ORGANIZED HEALTH CARE EDUCATION/TRAINING PROGRAM

## 2021-11-15 PROCEDURE — 1101F PT FALLS ASSESS-DOCD LE1/YR: CPT | Mod: HCNC,CPTII,S$GLB, | Performed by: STUDENT IN AN ORGANIZED HEALTH CARE EDUCATION/TRAINING PROGRAM

## 2021-11-15 PROCEDURE — 3044F HG A1C LEVEL LT 7.0%: CPT | Mod: HCNC,CPTII,S$GLB, | Performed by: STUDENT IN AN ORGANIZED HEALTH CARE EDUCATION/TRAINING PROGRAM

## 2021-11-15 PROCEDURE — 99499 RISK ADDL DX/OHS AUDIT: ICD-10-PCS | Mod: S$GLB,,, | Performed by: STUDENT IN AN ORGANIZED HEALTH CARE EDUCATION/TRAINING PROGRAM

## 2021-11-15 PROCEDURE — 1126F AMNT PAIN NOTED NONE PRSNT: CPT | Mod: HCNC,CPTII,S$GLB, | Performed by: STUDENT IN AN ORGANIZED HEALTH CARE EDUCATION/TRAINING PROGRAM

## 2021-11-15 PROCEDURE — 3078F DIAST BP <80 MM HG: CPT | Mod: HCNC,CPTII,S$GLB, | Performed by: STUDENT IN AN ORGANIZED HEALTH CARE EDUCATION/TRAINING PROGRAM

## 2021-11-15 PROCEDURE — 99214 OFFICE O/P EST MOD 30 MIN: CPT | Mod: HCNC,S$GLB,, | Performed by: STUDENT IN AN ORGANIZED HEALTH CARE EDUCATION/TRAINING PROGRAM

## 2021-11-15 PROCEDURE — 3288F PR FALLS RISK ASSESSMENT DOCUMENTED: ICD-10-PCS | Mod: HCNC,CPTII,S$GLB, | Performed by: STUDENT IN AN ORGANIZED HEALTH CARE EDUCATION/TRAINING PROGRAM

## 2021-11-15 PROCEDURE — 80053 COMPREHEN METABOLIC PANEL: CPT | Performed by: STUDENT IN AN ORGANIZED HEALTH CARE EDUCATION/TRAINING PROGRAM

## 2021-11-15 PROCEDURE — 1126F PR PAIN SEVERITY QUANTIFIED, NO PAIN PRESENT: ICD-10-PCS | Mod: HCNC,CPTII,S$GLB, | Performed by: STUDENT IN AN ORGANIZED HEALTH CARE EDUCATION/TRAINING PROGRAM

## 2021-11-15 PROCEDURE — 36415 COLL VENOUS BLD VENIPUNCTURE: CPT | Performed by: STUDENT IN AN ORGANIZED HEALTH CARE EDUCATION/TRAINING PROGRAM

## 2021-11-15 PROCEDURE — 71260 CT THORAX DX C+: CPT | Mod: TC,HCNC

## 2021-11-15 PROCEDURE — 4010F PR ACE/ARB THEARPY RXD/TAKEN: ICD-10-PCS | Mod: HCNC,CPTII,S$GLB, | Performed by: STUDENT IN AN ORGANIZED HEALTH CARE EDUCATION/TRAINING PROGRAM

## 2021-11-15 PROCEDURE — 1111F PR DISCHARGE MEDS RECONCILED W/ CURRENT OUTPATIENT MED LIST: ICD-10-PCS | Mod: HCNC,CPTII,S$GLB, | Performed by: STUDENT IN AN ORGANIZED HEALTH CARE EDUCATION/TRAINING PROGRAM

## 2021-11-15 PROCEDURE — 74177 CT CHEST ABDOMEN PELVIS WITH CONTRAST (XPD): ICD-10-PCS | Mod: 26,HCNC,, | Performed by: RADIOLOGY

## 2021-11-15 PROCEDURE — 83735 ASSAY OF MAGNESIUM: CPT | Performed by: STUDENT IN AN ORGANIZED HEALTH CARE EDUCATION/TRAINING PROGRAM

## 2021-11-15 PROCEDURE — 1111F DSCHRG MED/CURRENT MED MERGE: CPT | Mod: HCNC,CPTII,S$GLB, | Performed by: STUDENT IN AN ORGANIZED HEALTH CARE EDUCATION/TRAINING PROGRAM

## 2021-11-15 PROCEDURE — 1159F MED LIST DOCD IN RCRD: CPT | Mod: HCNC,CPTII,S$GLB, | Performed by: STUDENT IN AN ORGANIZED HEALTH CARE EDUCATION/TRAINING PROGRAM

## 2021-11-15 PROCEDURE — 1159F PR MEDICATION LIST DOCUMENTED IN MEDICAL RECORD: ICD-10-PCS | Mod: HCNC,CPTII,S$GLB, | Performed by: STUDENT IN AN ORGANIZED HEALTH CARE EDUCATION/TRAINING PROGRAM

## 2021-11-15 RX ORDER — SODIUM CHLORIDE 0.9 % (FLUSH) 0.9 %
10 SYRINGE (ML) INJECTION
Status: DISCONTINUED | OUTPATIENT
Start: 2021-11-15 | End: 2021-11-15 | Stop reason: HOSPADM

## 2021-11-15 RX ORDER — HEPARIN 100 UNIT/ML
500 SYRINGE INTRAVENOUS
Status: DISCONTINUED | OUTPATIENT
Start: 2021-11-15 | End: 2021-11-15 | Stop reason: HOSPADM

## 2021-11-15 RX ORDER — SODIUM CHLORIDE 0.9 % (FLUSH) 0.9 %
10 SYRINGE (ML) INJECTION
Status: CANCELLED | OUTPATIENT
Start: 2021-11-15

## 2021-11-15 RX ORDER — HEPARIN 100 UNIT/ML
500 SYRINGE INTRAVENOUS
Status: CANCELLED | OUTPATIENT
Start: 2021-11-15

## 2021-11-15 RX ADMIN — IOHEXOL 1000 ML: 12 SOLUTION ORAL at 09:11

## 2021-11-15 RX ADMIN — Medication 10 ML: at 12:11

## 2021-11-15 RX ADMIN — HEPARIN 500 UNITS: 100 SYRINGE at 12:11

## 2021-11-15 RX ADMIN — IOHEXOL 100 ML: 350 INJECTION, SOLUTION INTRAVENOUS at 11:11

## 2021-11-19 ENCOUNTER — TELEPHONE (OUTPATIENT)
Dept: HEMATOLOGY/ONCOLOGY | Facility: CLINIC | Age: 71
End: 2021-11-19
Payer: MEDICARE

## 2021-11-22 DIAGNOSIS — G89.29 OTHER CHRONIC PAIN: ICD-10-CM

## 2021-11-22 DIAGNOSIS — M25.562 CHRONIC PAIN OF LEFT KNEE: ICD-10-CM

## 2021-11-22 DIAGNOSIS — G89.4 CHRONIC PAIN DISORDER: ICD-10-CM

## 2021-11-22 DIAGNOSIS — G89.29 CHRONIC PAIN OF LEFT KNEE: ICD-10-CM

## 2021-11-22 RX ORDER — OXYCODONE AND ACETAMINOPHEN 5; 325 MG/1; MG/1
1 TABLET ORAL EVERY 8 HOURS PRN
Qty: 90 TABLET | Refills: 0 | Status: SHIPPED | OUTPATIENT
Start: 2021-11-22 | End: 2021-12-21 | Stop reason: SDUPTHER

## 2021-12-01 PROBLEM — A41.9 SEPSIS: Status: RESOLVED | Noted: 2021-10-14 | Resolved: 2021-12-01

## 2021-12-02 ENCOUNTER — OFFICE VISIT (OUTPATIENT)
Dept: SURGERY | Facility: CLINIC | Age: 71
End: 2021-12-02
Payer: MEDICARE

## 2021-12-02 VITALS
WEIGHT: 293 LBS | SYSTOLIC BLOOD PRESSURE: 117 MMHG | BODY MASS INDEX: 47.09 KG/M2 | DIASTOLIC BLOOD PRESSURE: 54 MMHG | HEART RATE: 80 BPM | HEIGHT: 66 IN

## 2021-12-02 DIAGNOSIS — E66.01 CLASS 3 SEVERE OBESITY DUE TO EXCESS CALORIES WITH SERIOUS COMORBIDITY AND BODY MASS INDEX (BMI) OF 50.0 TO 59.9 IN ADULT: ICD-10-CM

## 2021-12-02 DIAGNOSIS — C16.9 GASTRIC ADENOCARCINOMA: Primary | ICD-10-CM

## 2021-12-02 DIAGNOSIS — M17.12 PRIMARY OSTEOARTHRITIS OF LEFT KNEE: ICD-10-CM

## 2021-12-02 DIAGNOSIS — M25.562 CHRONIC PAIN OF LEFT KNEE: ICD-10-CM

## 2021-12-02 DIAGNOSIS — R26.81 GAIT INSTABILITY: ICD-10-CM

## 2021-12-02 DIAGNOSIS — R53.81 DEBILITY: ICD-10-CM

## 2021-12-02 DIAGNOSIS — G89.29 CHRONIC PAIN OF LEFT KNEE: ICD-10-CM

## 2021-12-02 PROCEDURE — 99999 PR PBB SHADOW E&M-EST. PATIENT-LVL III: ICD-10-PCS | Mod: PBBFAC,HCNC,, | Performed by: NURSE PRACTITIONER

## 2021-12-02 PROCEDURE — 99999 PR PBB SHADOW E&M-EST. PATIENT-LVL III: CPT | Mod: PBBFAC,HCNC,, | Performed by: NURSE PRACTITIONER

## 2021-12-02 PROCEDURE — 99214 PR OFFICE/OUTPT VISIT, EST, LEVL IV, 30-39 MIN: ICD-10-PCS | Mod: HCNC,S$GLB,, | Performed by: NURSE PRACTITIONER

## 2021-12-02 PROCEDURE — 4010F PR ACE/ARB THEARPY RXD/TAKEN: ICD-10-PCS | Mod: HCNC,CPTII,S$GLB, | Performed by: NURSE PRACTITIONER

## 2021-12-02 PROCEDURE — 4010F ACE/ARB THERAPY RXD/TAKEN: CPT | Mod: HCNC,CPTII,S$GLB, | Performed by: NURSE PRACTITIONER

## 2021-12-02 PROCEDURE — 99214 OFFICE O/P EST MOD 30 MIN: CPT | Mod: HCNC,S$GLB,, | Performed by: NURSE PRACTITIONER

## 2021-12-13 ENCOUNTER — TELEPHONE (OUTPATIENT)
Dept: PAIN MEDICINE | Facility: CLINIC | Age: 71
End: 2021-12-13
Payer: MEDICARE

## 2021-12-14 ENCOUNTER — OFFICE VISIT (OUTPATIENT)
Dept: HEMATOLOGY/ONCOLOGY | Facility: CLINIC | Age: 71
End: 2021-12-14
Payer: MEDICARE

## 2021-12-14 ENCOUNTER — PROCEDURE VISIT (OUTPATIENT)
Dept: PAIN MEDICINE | Facility: CLINIC | Age: 71
End: 2021-12-14
Payer: MEDICARE

## 2021-12-14 VITALS
WEIGHT: 293 LBS | OXYGEN SATURATION: 99 % | DIASTOLIC BLOOD PRESSURE: 51 MMHG | BODY MASS INDEX: 47.09 KG/M2 | RESPIRATION RATE: 16 BRPM | HEIGHT: 66 IN | SYSTOLIC BLOOD PRESSURE: 107 MMHG | HEART RATE: 67 BPM | TEMPERATURE: 98 F

## 2021-12-14 VITALS
DIASTOLIC BLOOD PRESSURE: 56 MMHG | HEART RATE: 75 BPM | WEIGHT: 293 LBS | RESPIRATION RATE: 18 BRPM | HEIGHT: 66 IN | BODY MASS INDEX: 47.09 KG/M2 | SYSTOLIC BLOOD PRESSURE: 109 MMHG | OXYGEN SATURATION: 99 %

## 2021-12-14 DIAGNOSIS — G89.29 CHRONIC PAIN OF LEFT KNEE: Primary | ICD-10-CM

## 2021-12-14 DIAGNOSIS — R60.0 BILATERAL LEG EDEMA: ICD-10-CM

## 2021-12-14 DIAGNOSIS — M25.562 CHRONIC PAIN OF LEFT KNEE: Primary | ICD-10-CM

## 2021-12-14 DIAGNOSIS — G89.29 CHRONIC PAIN OF LEFT KNEE: ICD-10-CM

## 2021-12-14 DIAGNOSIS — M25.562 CHRONIC PAIN OF LEFT KNEE: ICD-10-CM

## 2021-12-14 DIAGNOSIS — I82.411 DVT OF DEEP FEMORAL VEIN, RIGHT: ICD-10-CM

## 2021-12-14 DIAGNOSIS — M17.12 PRIMARY OSTEOARTHRITIS OF LEFT KNEE: ICD-10-CM

## 2021-12-14 DIAGNOSIS — E78.5 HYPERLIPIDEMIA, UNSPECIFIED HYPERLIPIDEMIA TYPE: ICD-10-CM

## 2021-12-14 DIAGNOSIS — D84.81 IMMUNODEFICIENCY DUE TO CONDITIONS CLASSIFIED ELSEWHERE: ICD-10-CM

## 2021-12-14 DIAGNOSIS — C16.9 GASTRIC ADENOCARCINOMA: Primary | ICD-10-CM

## 2021-12-14 DIAGNOSIS — E66.01 MORBID OBESITY: ICD-10-CM

## 2021-12-14 DIAGNOSIS — I10 ESSENTIAL HYPERTENSION: ICD-10-CM

## 2021-12-14 PROCEDURE — 99214 PR OFFICE/OUTPT VISIT, EST, LEVL IV, 30-39 MIN: ICD-10-PCS | Mod: HCNC,S$GLB,, | Performed by: STUDENT IN AN ORGANIZED HEALTH CARE EDUCATION/TRAINING PROGRAM

## 2021-12-14 PROCEDURE — 20610 DRAIN/INJ JOINT/BURSA W/O US: CPT | Mod: HCNC,LT,S$GLB, | Performed by: ANESTHESIOLOGY

## 2021-12-14 PROCEDURE — 99999 PR PBB SHADOW E&M-EST. PATIENT-LVL V: ICD-10-PCS | Mod: PBBFAC,HCNC,, | Performed by: STUDENT IN AN ORGANIZED HEALTH CARE EDUCATION/TRAINING PROGRAM

## 2021-12-14 PROCEDURE — 99999 PR PBB SHADOW E&M-EST. PATIENT-LVL V: CPT | Mod: PBBFAC,HCNC,, | Performed by: STUDENT IN AN ORGANIZED HEALTH CARE EDUCATION/TRAINING PROGRAM

## 2021-12-14 PROCEDURE — 4010F ACE/ARB THERAPY RXD/TAKEN: CPT | Mod: HCNC,CPTII,S$GLB, | Performed by: STUDENT IN AN ORGANIZED HEALTH CARE EDUCATION/TRAINING PROGRAM

## 2021-12-14 PROCEDURE — 99214 OFFICE O/P EST MOD 30 MIN: CPT | Mod: HCNC,S$GLB,, | Performed by: STUDENT IN AN ORGANIZED HEALTH CARE EDUCATION/TRAINING PROGRAM

## 2021-12-14 PROCEDURE — 4010F PR ACE/ARB THEARPY RXD/TAKEN: ICD-10-PCS | Mod: HCNC,CPTII,S$GLB, | Performed by: STUDENT IN AN ORGANIZED HEALTH CARE EDUCATION/TRAINING PROGRAM

## 2021-12-14 PROCEDURE — 99499 UNLISTED E&M SERVICE: CPT | Mod: S$GLB,,, | Performed by: STUDENT IN AN ORGANIZED HEALTH CARE EDUCATION/TRAINING PROGRAM

## 2021-12-14 PROCEDURE — 99499 RISK ADDL DX/OHS AUDIT: ICD-10-PCS | Mod: S$GLB,,, | Performed by: STUDENT IN AN ORGANIZED HEALTH CARE EDUCATION/TRAINING PROGRAM

## 2021-12-14 PROCEDURE — 20610 PR DRAIN/INJECT LARGE JOINT/BURSA: ICD-10-PCS | Mod: HCNC,LT,S$GLB, | Performed by: ANESTHESIOLOGY

## 2021-12-15 ENCOUNTER — TELEPHONE (OUTPATIENT)
Dept: ENDOSCOPY | Facility: HOSPITAL | Age: 71
End: 2021-12-15
Payer: MEDICARE

## 2021-12-15 DIAGNOSIS — C15.9 MALIGNANT NEOPLASM OF ESOPHAGUS, UNSPECIFIED LOCATION: Primary | ICD-10-CM

## 2021-12-16 ENCOUNTER — TELEPHONE (OUTPATIENT)
Dept: ENDOSCOPY | Facility: HOSPITAL | Age: 71
End: 2021-12-16
Payer: MEDICARE

## 2021-12-20 DIAGNOSIS — M25.562 CHRONIC PAIN OF LEFT KNEE: ICD-10-CM

## 2021-12-20 DIAGNOSIS — G89.29 CHRONIC PAIN OF LEFT KNEE: ICD-10-CM

## 2021-12-20 DIAGNOSIS — G89.4 CHRONIC PAIN DISORDER: ICD-10-CM

## 2021-12-20 DIAGNOSIS — G89.29 OTHER CHRONIC PAIN: ICD-10-CM

## 2021-12-21 RX ORDER — OXYCODONE AND ACETAMINOPHEN 5; 325 MG/1; MG/1
1 TABLET ORAL EVERY 8 HOURS PRN
Qty: 90 TABLET | Refills: 0 | Status: SHIPPED | OUTPATIENT
Start: 2021-12-21 | End: 2022-01-21 | Stop reason: SDUPTHER

## 2021-12-22 ENCOUNTER — TELEPHONE (OUTPATIENT)
Dept: ENDOSCOPY | Facility: HOSPITAL | Age: 71
End: 2021-12-22
Payer: MEDICARE

## 2021-12-27 ENCOUNTER — TELEPHONE (OUTPATIENT)
Dept: PAIN MEDICINE | Facility: CLINIC | Age: 71
End: 2021-12-27
Payer: MEDICARE

## 2021-12-28 ENCOUNTER — OFFICE VISIT (OUTPATIENT)
Dept: PAIN MEDICINE | Facility: CLINIC | Age: 71
End: 2021-12-28
Payer: MEDICARE

## 2021-12-28 VITALS
HEART RATE: 84 BPM | HEIGHT: 66 IN | RESPIRATION RATE: 18 BRPM | BODY MASS INDEX: 47.09 KG/M2 | DIASTOLIC BLOOD PRESSURE: 70 MMHG | TEMPERATURE: 98 F | WEIGHT: 293 LBS | SYSTOLIC BLOOD PRESSURE: 108 MMHG

## 2021-12-28 DIAGNOSIS — G89.4 CHRONIC PAIN DISORDER: ICD-10-CM

## 2021-12-28 DIAGNOSIS — M25.562 CHRONIC PAIN OF LEFT KNEE: Primary | ICD-10-CM

## 2021-12-28 DIAGNOSIS — G89.29 CHRONIC PAIN OF LEFT KNEE: Primary | ICD-10-CM

## 2021-12-28 DIAGNOSIS — C16.9 GASTRIC ADENOCARCINOMA: ICD-10-CM

## 2021-12-28 DIAGNOSIS — M17.12 PRIMARY OSTEOARTHRITIS OF LEFT KNEE: ICD-10-CM

## 2021-12-28 PROCEDURE — 3288F FALL RISK ASSESSMENT DOCD: CPT | Mod: HCNC,CPTII,S$GLB, | Performed by: NURSE PRACTITIONER

## 2021-12-28 PROCEDURE — 99213 PR OFFICE/OUTPT VISIT, EST, LEVL III, 20-29 MIN: ICD-10-PCS | Mod: HCNC,S$GLB,, | Performed by: NURSE PRACTITIONER

## 2021-12-28 PROCEDURE — 1101F PT FALLS ASSESS-DOCD LE1/YR: CPT | Mod: HCNC,CPTII,S$GLB, | Performed by: NURSE PRACTITIONER

## 2021-12-28 PROCEDURE — 4010F PR ACE/ARB THEARPY RXD/TAKEN: ICD-10-PCS | Mod: HCNC,CPTII,S$GLB, | Performed by: NURSE PRACTITIONER

## 2021-12-28 PROCEDURE — 99213 OFFICE O/P EST LOW 20 MIN: CPT | Mod: HCNC,S$GLB,, | Performed by: NURSE PRACTITIONER

## 2021-12-28 PROCEDURE — 3074F PR MOST RECENT SYSTOLIC BLOOD PRESSURE < 130 MM HG: ICD-10-PCS | Mod: HCNC,CPTII,S$GLB, | Performed by: NURSE PRACTITIONER

## 2021-12-28 PROCEDURE — 1160F RVW MEDS BY RX/DR IN RCRD: CPT | Mod: HCNC,CPTII,S$GLB, | Performed by: NURSE PRACTITIONER

## 2021-12-28 PROCEDURE — 3074F SYST BP LT 130 MM HG: CPT | Mod: HCNC,CPTII,S$GLB, | Performed by: NURSE PRACTITIONER

## 2021-12-28 PROCEDURE — 3288F PR FALLS RISK ASSESSMENT DOCUMENTED: ICD-10-PCS | Mod: HCNC,CPTII,S$GLB, | Performed by: NURSE PRACTITIONER

## 2021-12-28 PROCEDURE — 1126F PR PAIN SEVERITY QUANTIFIED, NO PAIN PRESENT: ICD-10-PCS | Mod: HCNC,CPTII,S$GLB, | Performed by: NURSE PRACTITIONER

## 2021-12-28 PROCEDURE — 4010F ACE/ARB THERAPY RXD/TAKEN: CPT | Mod: HCNC,CPTII,S$GLB, | Performed by: NURSE PRACTITIONER

## 2021-12-28 PROCEDURE — 99999 PR PBB SHADOW E&M-EST. PATIENT-LVL IV: ICD-10-PCS | Mod: PBBFAC,HCNC,, | Performed by: NURSE PRACTITIONER

## 2021-12-28 PROCEDURE — 99999 PR PBB SHADOW E&M-EST. PATIENT-LVL IV: CPT | Mod: PBBFAC,HCNC,, | Performed by: NURSE PRACTITIONER

## 2021-12-28 PROCEDURE — 3008F BODY MASS INDEX DOCD: CPT | Mod: HCNC,CPTII,S$GLB, | Performed by: NURSE PRACTITIONER

## 2021-12-28 PROCEDURE — 3008F PR BODY MASS INDEX (BMI) DOCUMENTED: ICD-10-PCS | Mod: HCNC,CPTII,S$GLB, | Performed by: NURSE PRACTITIONER

## 2021-12-28 PROCEDURE — 3078F PR MOST RECENT DIASTOLIC BLOOD PRESSURE < 80 MM HG: ICD-10-PCS | Mod: HCNC,CPTII,S$GLB, | Performed by: NURSE PRACTITIONER

## 2021-12-28 PROCEDURE — 3078F DIAST BP <80 MM HG: CPT | Mod: HCNC,CPTII,S$GLB, | Performed by: NURSE PRACTITIONER

## 2021-12-28 PROCEDURE — 1101F PR PT FALLS ASSESS DOC 0-1 FALLS W/OUT INJ PAST YR: ICD-10-PCS | Mod: HCNC,CPTII,S$GLB, | Performed by: NURSE PRACTITIONER

## 2021-12-28 PROCEDURE — 3044F HG A1C LEVEL LT 7.0%: CPT | Mod: HCNC,CPTII,S$GLB, | Performed by: NURSE PRACTITIONER

## 2021-12-28 PROCEDURE — 1159F PR MEDICATION LIST DOCUMENTED IN MEDICAL RECORD: ICD-10-PCS | Mod: HCNC,CPTII,S$GLB, | Performed by: NURSE PRACTITIONER

## 2021-12-28 PROCEDURE — 3044F PR MOST RECENT HEMOGLOBIN A1C LEVEL <7.0%: ICD-10-PCS | Mod: HCNC,CPTII,S$GLB, | Performed by: NURSE PRACTITIONER

## 2021-12-28 PROCEDURE — 1160F PR REVIEW ALL MEDS BY PRESCRIBER/CLIN PHARMACIST DOCUMENTED: ICD-10-PCS | Mod: HCNC,CPTII,S$GLB, | Performed by: NURSE PRACTITIONER

## 2021-12-28 PROCEDURE — 1159F MED LIST DOCD IN RCRD: CPT | Mod: HCNC,CPTII,S$GLB, | Performed by: NURSE PRACTITIONER

## 2021-12-28 PROCEDURE — 1126F AMNT PAIN NOTED NONE PRSNT: CPT | Mod: HCNC,CPTII,S$GLB, | Performed by: NURSE PRACTITIONER

## 2022-01-07 ENCOUNTER — TELEPHONE (OUTPATIENT)
Dept: ENDOSCOPY | Facility: HOSPITAL | Age: 72
End: 2022-01-07
Payer: MEDICARE

## 2022-01-07 NOTE — TELEPHONE ENCOUNTER
Patient is scheduled for EGD and EUS procedures on 1/13/22. Is patient able to hold eliquis for 2 days prior to procedure as recommended per endoscopy protocol? Please advise.    Thanks,  GLEN Lua

## 2022-01-07 NOTE — TELEPHONE ENCOUNTER
Received request to schedule patient for EGD/EUS on 1/13/22 at 10:15 am. Spoke with patient. Reviewed medical history and medications. Instructed on procedure and prep. Patient verbalized understanding of instructions. Copy of instructions sent via mail and reviewed verbally with patient.

## 2022-01-12 ENCOUNTER — HOSPITAL ENCOUNTER (OUTPATIENT)
Dept: RADIOLOGY | Facility: HOSPITAL | Age: 72
Discharge: HOME | End: 2022-01-12
Attending: PHYSICIAN ASSISTANT
Payer: MEDICARE

## 2022-01-12 ENCOUNTER — OFFICE VISIT (OUTPATIENT)
Dept: INTERNAL MEDICINE | Facility: CLINIC | Age: 72
End: 2022-01-12
Payer: MEDICARE

## 2022-01-12 VITALS
WEIGHT: 293 LBS | HEIGHT: 66 IN | BODY MASS INDEX: 47.09 KG/M2 | HEART RATE: 67 BPM | SYSTOLIC BLOOD PRESSURE: 126 MMHG | DIASTOLIC BLOOD PRESSURE: 58 MMHG | OXYGEN SATURATION: 97 % | TEMPERATURE: 99 F

## 2022-01-12 DIAGNOSIS — I50.32 CHRONIC DIASTOLIC CONGESTIVE HEART FAILURE: Chronic | ICD-10-CM

## 2022-01-12 DIAGNOSIS — R06.02 SHORTNESS OF BREATH: ICD-10-CM

## 2022-01-12 DIAGNOSIS — E03.9 HYPOTHYROIDISM, UNSPECIFIED TYPE: ICD-10-CM

## 2022-01-12 DIAGNOSIS — D50.0 IRON DEFICIENCY ANEMIA SECONDARY TO BLOOD LOSS (CHRONIC): ICD-10-CM

## 2022-01-12 DIAGNOSIS — R60.0 BILATERAL LOWER EXTREMITY EDEMA: ICD-10-CM

## 2022-01-12 DIAGNOSIS — C16.9 GASTRIC ADENOCARCINOMA: ICD-10-CM

## 2022-01-12 DIAGNOSIS — I82.4Z9 ACUTE DEEP VEIN THROMBOSIS (DVT) OF DISTAL VEIN OF LOWER EXTREMITY, UNSPECIFIED LATERALITY: ICD-10-CM

## 2022-01-12 DIAGNOSIS — I10 ESSENTIAL HYPERTENSION: ICD-10-CM

## 2022-01-12 DIAGNOSIS — G89.29 OTHER CHRONIC PAIN: ICD-10-CM

## 2022-01-12 DIAGNOSIS — R60.0 BILATERAL LOWER EXTREMITY EDEMA: Primary | ICD-10-CM

## 2022-01-12 PROCEDURE — 3008F BODY MASS INDEX DOCD: CPT | Mod: CPTII,S$GLB,, | Performed by: PHYSICIAN ASSISTANT

## 2022-01-12 PROCEDURE — 1101F PT FALLS ASSESS-DOCD LE1/YR: CPT | Mod: CPTII,S$GLB,, | Performed by: PHYSICIAN ASSISTANT

## 2022-01-12 PROCEDURE — 1159F MED LIST DOCD IN RCRD: CPT | Mod: CPTII,S$GLB,, | Performed by: PHYSICIAN ASSISTANT

## 2022-01-12 PROCEDURE — 99215 OFFICE O/P EST HI 40 MIN: CPT | Mod: S$GLB,,, | Performed by: PHYSICIAN ASSISTANT

## 2022-01-12 PROCEDURE — 71046 X-RAY EXAM CHEST 2 VIEWS: CPT | Mod: 26,,, | Performed by: RADIOLOGY

## 2022-01-12 PROCEDURE — 1160F RVW MEDS BY RX/DR IN RCRD: CPT | Mod: CPTII,S$GLB,, | Performed by: PHYSICIAN ASSISTANT

## 2022-01-12 PROCEDURE — 71046 X-RAY EXAM CHEST 2 VIEWS: CPT | Mod: TC

## 2022-01-12 PROCEDURE — 1125F PR PAIN SEVERITY QUANTIFIED, PAIN PRESENT: ICD-10-PCS | Mod: CPTII,S$GLB,, | Performed by: PHYSICIAN ASSISTANT

## 2022-01-12 PROCEDURE — 99215 OFFICE O/P EST HI 40 MIN: CPT | Mod: PBBFAC,25 | Performed by: PHYSICIAN ASSISTANT

## 2022-01-12 PROCEDURE — 99999 PR PBB SHADOW E&M-EST. PATIENT-LVL V: CPT | Mod: PBBFAC,,, | Performed by: PHYSICIAN ASSISTANT

## 2022-01-12 PROCEDURE — 3074F SYST BP LT 130 MM HG: CPT | Mod: CPTII,S$GLB,, | Performed by: PHYSICIAN ASSISTANT

## 2022-01-12 PROCEDURE — 3288F FALL RISK ASSESSMENT DOCD: CPT | Mod: CPTII,S$GLB,, | Performed by: PHYSICIAN ASSISTANT

## 2022-01-12 PROCEDURE — 3074F PR MOST RECENT SYSTOLIC BLOOD PRESSURE < 130 MM HG: ICD-10-PCS | Mod: CPTII,S$GLB,, | Performed by: PHYSICIAN ASSISTANT

## 2022-01-12 PROCEDURE — 1160F PR REVIEW ALL MEDS BY PRESCRIBER/CLIN PHARMACIST DOCUMENTED: ICD-10-PCS | Mod: CPTII,S$GLB,, | Performed by: PHYSICIAN ASSISTANT

## 2022-01-12 PROCEDURE — 3078F PR MOST RECENT DIASTOLIC BLOOD PRESSURE < 80 MM HG: ICD-10-PCS | Mod: CPTII,S$GLB,, | Performed by: PHYSICIAN ASSISTANT

## 2022-01-12 PROCEDURE — 3078F DIAST BP <80 MM HG: CPT | Mod: CPTII,S$GLB,, | Performed by: PHYSICIAN ASSISTANT

## 2022-01-12 PROCEDURE — 71046 XR CHEST PA AND LATERAL: ICD-10-PCS | Mod: 26,,, | Performed by: RADIOLOGY

## 2022-01-12 PROCEDURE — 3008F PR BODY MASS INDEX (BMI) DOCUMENTED: ICD-10-PCS | Mod: CPTII,S$GLB,, | Performed by: PHYSICIAN ASSISTANT

## 2022-01-12 PROCEDURE — 3288F PR FALLS RISK ASSESSMENT DOCUMENTED: ICD-10-PCS | Mod: CPTII,S$GLB,, | Performed by: PHYSICIAN ASSISTANT

## 2022-01-12 PROCEDURE — 99999 PR PBB SHADOW E&M-EST. PATIENT-LVL V: ICD-10-PCS | Mod: PBBFAC,,, | Performed by: PHYSICIAN ASSISTANT

## 2022-01-12 PROCEDURE — 1159F PR MEDICATION LIST DOCUMENTED IN MEDICAL RECORD: ICD-10-PCS | Mod: CPTII,S$GLB,, | Performed by: PHYSICIAN ASSISTANT

## 2022-01-12 PROCEDURE — 99215 PR OFFICE/OUTPT VISIT, EST, LEVL V, 40-54 MIN: ICD-10-PCS | Mod: S$GLB,,, | Performed by: PHYSICIAN ASSISTANT

## 2022-01-12 PROCEDURE — 1125F AMNT PAIN NOTED PAIN PRSNT: CPT | Mod: CPTII,S$GLB,, | Performed by: PHYSICIAN ASSISTANT

## 2022-01-12 PROCEDURE — 1101F PR PT FALLS ASSESS DOC 0-1 FALLS W/OUT INJ PAST YR: ICD-10-PCS | Mod: CPTII,S$GLB,, | Performed by: PHYSICIAN ASSISTANT

## 2022-01-12 NOTE — PATIENT INSTRUCTIONS
Patient Education       Dependent Edema Discharge Instructions   About this topic   Edema is another word for swelling. It is often caused by an illness. The swelling happens when the fluid inside your body is trapped under the skin and tissues.  Dependent edema means the extra fluid settles in parts affected by gravity. These parts include the legs, ankles, and feet. For people who are in bed most of the time, the edema collects in the back or side of the body. Treatment includes treating the cause of the illness and getting rid of the extra water.     What care is needed at home?   · Ask your doctor what you need to do when you go home. Make sure you ask questions if you do not understand what the doctor says. This way you will know what you need to do.  · Make sure to take all the drugs ordered by your doctor.  · Your doctor may give you support hose to help get the fluid back into the veins so the kidneys can remove it.  · Raise your legs by putting 2 to 3 pillows under them when you sit or lie down.  · You may need to reduce the salt and water in your diet.  What follow-up care is needed?   · Your condition needs to be watched closely. Your doctor may ask you to make visits to the office to check on your progress. Be sure to keep these visits.  · Your doctor will tell you if more tests are needed.  What drugs may be needed?   The doctor may order drugs to:  · Get rid of the extra water, which will result in you making more urine.  · Treat the cause of the edema  Will physical activity be limited?   If the edema in your legs is painful, your movements may be limited for a time. If your heart is weak, then your activity may need to be decreased. Ask your doctor what exercise is right for you.  What changes to diet are needed?   · Low-salt diet  · A dietitian can help you reduce the salt and water in your diet.  What problems could happen?   The amount of edema may cause the skin to stretch and tear or you may  develop blisters. If this happens, you may get an infection where the skin is torn.  What can be done to prevent this health problem?   · Take care of your heart. Eat healthy and exercise regularly. Lower your risk factors for heart disease by watching your blood pressure and cholesterol.  · After walking or standing for a long period of time, raise your legs when you get home.  · Avoid foods that are too salty.  · Avoid standing for long periods of time.  When do I need to call the doctor?   · If you notice the edema or fluid increasing in your legs push the skin in with your thumb over the area with the edema. If your thumb sinks in to the swollen area more than usual, call your doctor.  · Muscle weakness  · Muscle cramps  · If you feel more short of breath than usual  · Chest pain  · Dizziness or lightheadedness, feeling like you may pass out  · Your urine becomes darker than normal  · You are not feeling better in 2 to 3 days or you are feeling worse  Teach Back: Helping You Understand   The Teach Back Method helps you understand the information we are giving you. After you talk with the staff, tell them in your own words what you learned. This helps to make sure the staff has described each thing clearly. It also helps to explain things that may have been confusing. Before going home, make sure you can do these:  · I can tell you about my condition.  · I can tell you ways to help lower swelling in my legs.  · I can tell you what I will do if I have more swelling in my legs, muscle weakness, or muscle cramps.  Where can I learn more?   FamilyDoctor.org  https://familydoctor.org/condition/edema/   N[facility] Choices  http://www.nhs.uk/conditions/oedema/pages/introduction.aspx   Last Reviewed Date   2021-03-26  Consumer Information Use and Disclaimer   This information is not specific medical advice and does not replace information you receive from your health care provider. This is only a brief summary of general  "information. It does NOT include all information about conditions, illnesses, injuries, tests, procedures, treatments, therapies, discharge instructions or life-style choices that may apply to you. You must talk with your health care provider for complete information about your health and treatment options. This information should not be used to decide whether or not to accept your health care providers advice, instructions or recommendations. Only your health care provider has the knowledge and training to provide advice that is right for you.  Copyright   Copyright © 2021 UpToDate, Inc. and its affiliates and/or licensors. All rights reserved.  Patient Education       Shortness of Breath (Dyspnea)   The Basics   Written by the doctors and editors at [facility]   What is shortness of breath? -- People who have shortness of breath describe it in different ways. Some people say that they feel like they can't take a deep breath in or get enough air. Other people might feel like their chest is tight or they have to work harder than usual to breathe. The medical word for shortness of breath is "dyspnea."  Shortness of breath can start suddenly, over minutes to hours. It can also happen over a longer period of time, from weeks to months.  What causes shortness of breath? -- Different medical conditions can cause shortness of breath.  Causes of shortness of breath that starts suddenly include:  · Coronavirus 2019 (COVID-19) - This is an infection caused by a virus called SARS-CoV-2.  · Lung problems, such as asthma, infections, or blood clots - These conditions might cause other symptoms, too. For example, a lung infection usually causes a fever and cough.  · Heart problems, such as a heart attack or heart failure - A heart attack can also cause chest pain or pressure. Heart failure is when the heart does not pump as well as it should.  · A severe allergic reaction, called "anaphylaxis" - This can also cause itching, " swelling, or a rash.  · Pregnancy - It can be normal for pregnant women to feel slightly short of breath just after they lie down or are active.  The most common causes of shortness of breath that happens over weeks to months are:  · Lung problems, such as asthma or chronic obstructive pulmonary disease (COPD) - Both of these conditions can make it hard to breathe. COPD is usually caused by smoking.  · Heart problems, such as heart failure or a change in the size and shape of the heart (called cardiomyopathy)  · Being overweight or out of shape  Should I see a doctor or nurse? -- Yes. If you have shortness of breath, you should see your doctor or nurse.  Sometimes shortness of breath means that your condition is serious and you need emergency help. Call for an ambulance (in the [place] and [place], dial 9-1-1) if you have:  · Shortness of breath and think you are having a heart attack  · Severe shortness of breath (hard to breathe when you are sitting still)  · An allergic reaction with shortness of breath  Will I have tests? -- You might. Your doctor or nurse will talk with you, ask about your symptoms, and do an exam. Depending on what the doctor or nurse finds, they might order 1 or more of the following tests:  · A swab from your inside your nose (to test for the virus that causes COVID-19)  · Blood tests  · A chest X-ray  · An electrocardiogram (ECG) to measure the electrical activity in your heart  · A breathing test  Depending on your results, you might need more tests, too.  How is shortness of breath treated? -- Shortness of breath is treated in different ways, depending on the cause. Once your doctor or nurse figures out the cause of your symptoms, they will talk with you about different possible treatments.  All topics are updated as new evidence becomes available and our peer review process is complete.  This topic retrieved from [employer] on: Sep 21, 2021.  Topic 40356 Version 11.0  Release: 29.4.2 -  C29.263  © 2021 UpToDate, Inc. and/or its affiliates. All rights reserved.  Consumer Information Use and Disclaimer   This information is not specific medical advice and does not replace information you receive from your health care provider. This is only a brief summary of general information. It does NOT include all information about conditions, illnesses, injuries, tests, procedures, treatments, therapies, discharge instructions or life-style choices that may apply to you. You must talk with your health care provider for complete information about your health and treatment options. This information should not be used to decide whether or not to accept your health care provider's advice, instructions or recommendations. Only your health care provider has the knowledge and training to provide advice that is right for you. The use of this information is governed by the [employer] End User License Agreement, available at https://www.[employer]ch.[employer]eeo/en/solutions/[employer]/about/olga.The use of [employer] content is governed by the [employer] Terms of Use. ©2021 UpToDate, Inc. All rights reserved.  Copyright   © 2021 UpToDate, Inc. and/or its affiliates. All rights reserved.

## 2022-01-12 NOTE — PROGRESS NOTES
Subjective:       Patient ID: DARCY Coats is a 71 y.o. female.        Chief Complaint: Leg Swelling (Fluid in legs)    DARCY Coats is an established patient of Lei Garcia MD here today for urgent care visit.    She was hospitalized 10/2021 for c diff.  No diarrhea since.      She has shortness of breath, especially with exertion, over the past several months.  Not worsening but not resolving.  She has also had leg swelling over the same time frame.  Legs feel heavy.  All ongoing since hospital discharge 10/2021.  Denies chest pain.  Compression stockings help with swelling but difficult for her to put on and remove.      She has a caregiver with her, Indu.      Past medical history significant for chronic pain, CHF, lipids, HTN, h/o c diff, DVT, gastric adenocarcinoma, NILSON, GERD, OA, osteopenia, LAURA.    Gastric adenocarcinoma found on work up for gastric bypass surgery.      Taking eliquis for DVT.       HTN controlled with amlodipine and lisinopril.      Obesity, BMI 52    CHF, abnormal stress test 1/2021 followed by [facility] 2/2021 with clean coronary arteries          Review of Systems   Constitutional: Negative for chills, diaphoresis, fatigue and fever.   HENT: Negative for congestion and sore throat.    Eyes: Negative for visual disturbance.   Respiratory: Positive for shortness of breath. Negative for cough and chest tightness.    Cardiovascular: Positive for leg swelling. Negative for chest pain and palpitations.   Gastrointestinal: Negative for abdominal pain, blood in stool, constipation, diarrhea, nausea and vomiting.   Genitourinary: Negative for dysuria, frequency, hematuria and urgency.   Musculoskeletal: Positive for arthralgias and back pain.   Skin: Negative for rash.   Neurological: Negative for dizziness, syncope, weakness and headaches.   Psychiatric/Behavioral: Negative for dysphoric mood and sleep disturbance. The patient is not nervous/anxious.        Objective:      Physical  Exam  Vitals and nursing note reviewed.   Constitutional:       Appearance: Normal appearance. She is well-developed and well-nourished.      Comments: Sitting in wheelchair   HENT:      Head: Normocephalic.      Right Ear: External ear normal.      Left Ear: External ear normal.      Mouth/Throat:      Mouth: Oropharynx is clear and moist.   Eyes:      Pupils: Pupils are equal, round, and reactive to light.   Cardiovascular:      Rate and Rhythm: Normal rate and regular rhythm.      Heart sounds: Normal heart sounds. No murmur heard.  No friction rub. No gallop.    Pulmonary:      Effort: Pulmonary effort is normal. No respiratory distress.      Breath sounds: Normal breath sounds.   Abdominal:      Palpations: Abdomen is soft.      Tenderness: There is no abdominal tenderness. There is no CVA tenderness.   Musculoskeletal:         General: No edema.      Comments: 1+ BLE pitting edema    Skin:     General: Skin is warm and dry.   Neurological:      Mental Status: She is alert.   Psychiatric:         Mood and Affect: Mood and affect normal.         Assessment:       1. Bilateral lower extremity edema    2. Shortness of breath    3. Essential hypertension    4. Other chronic pain    5. Chronic diastolic congestive heart failure    6. Acute deep vein thrombosis (DVT) of distal vein of lower extremity, unspecified laterality    7. Gastric adenocarcinoma    8. Iron deficiency anemia secondary to blood loss (chronic)    9. Hypothyroidism, unspecified type        Plan:       F was seen today for leg swelling.    Diagnoses and all orders for this visit:    Bilateral lower extremity edema  -     CBC Auto Differential; Future  -     Comprehensive Metabolic Panel; Future  -     BNP; Future  -     X-Ray Chest PA And Lateral; Future  -     Ambulatory referral/consult to Cardiology; Future    Shortness of breath  -     CBC Auto Differential; Future  -     Comprehensive Metabolic Panel; Future  -     BNP; Future  -     X-Ray  "Chest PA And Lateral; Future  -     Ambulatory referral/consult to Cardiology; Future    Essential hypertension - stable and controlled  BP Readings from Last 5 Encounters:   01/12/22 (!) 126/58   12/28/21 108/70   12/14/21 (!) 109/56   12/14/21 (!) 107/51   12/02/21 (!) 117/54      Other chronic pain - followed by pain clinic    Chronic diastolic congestive heart failure - cardiology referral as above    Acute deep vein thrombosis (DVT) of distal vein of lower extremity, unspecified laterality - taking eliquis    Gastric adenocarcinoma - followed by heme/onc    Iron deficiency anemia secondary to blood loss (chronic) - taking iron    Hypothyroidism, unspecified type - TSH normal 10/2021    Will check labs and CXR today and refer to cardiology  >45 minutes spent on patient encounter today    Pt has been given instructions populated from [employer] database and has verbalized understanding of the after visit summary and information contained wherein.    Follow up with a primary care provider. May go to ER for acute shortness of breath, lightheadedness, fever, or any other emergent complaints or changes in condition.    "This note will be shared with the patient"    Future Appointments   Date Time Provider Department Center   1/20/2022  9:15 AM [facility]   1/20/2022 10:00 AM Cathy Pelaez MD [facility] HEM ONC [facility]   1/20/2022 10:30 AM INJECTION, [facility] CHEMO [facility] CHEMO [facility]   3/21/2022 10:00 AM Savanna Hyatt NP [facility] PAINMGT [facility]               "

## 2022-01-13 ENCOUNTER — HOSPITAL ENCOUNTER (OUTPATIENT)
Facility: HOSPITAL | Age: 72
Discharge: HOME | End: 2022-01-13
Attending: INTERNAL MEDICINE | Admitting: INTERNAL MEDICINE
Payer: MEDICARE

## 2022-01-13 ENCOUNTER — ANESTHESIA (OUTPATIENT)
Dept: ENDOSCOPY | Facility: HOSPITAL | Age: 72
End: 2022-01-13
Payer: MEDICARE

## 2022-01-13 ENCOUNTER — ANESTHESIA EVENT (OUTPATIENT)
Dept: ENDOSCOPY | Facility: HOSPITAL | Age: 72
End: 2022-01-13
Payer: MEDICARE

## 2022-01-13 ENCOUNTER — TELEPHONE (OUTPATIENT)
Dept: INTERNAL MEDICINE | Facility: CLINIC | Age: 72
End: 2022-01-13

## 2022-01-13 VITALS
HEIGHT: 66 IN | DIASTOLIC BLOOD PRESSURE: 78 MMHG | HEART RATE: 72 BPM | RESPIRATION RATE: 22 BRPM | WEIGHT: 293 LBS | BODY MASS INDEX: 47.09 KG/M2 | SYSTOLIC BLOOD PRESSURE: 141 MMHG | TEMPERATURE: 98 F | OXYGEN SATURATION: 97 %

## 2022-01-13 DIAGNOSIS — C16.9 GASTRIC ADENOCARCINOMA: Primary | ICD-10-CM

## 2022-01-13 LAB
CTP QC/QA: YES
SARS-COV-2 AG RESP QL IA.RAPID: NEGATIVE

## 2022-01-13 PROCEDURE — 88341 IMHCHEM/IMCYTCHM EA ADD ANTB: CPT | Mod: 26,HCNC,, | Performed by: PATHOLOGY

## 2022-01-13 PROCEDURE — 88342 IMHCHEM/IMCYTCHM 1ST ANTB: CPT | Mod: 26,HCNC,, | Performed by: PATHOLOGY

## 2022-01-13 PROCEDURE — 43259 EGD US EXAM DUODENUM/JEJUNUM: CPT | Mod: HCNC | Performed by: INTERNAL MEDICINE

## 2022-01-13 PROCEDURE — 88305 TISSUE EXAM BY PATHOLOGIST: ICD-10-PCS | Mod: 26,HCNC,, | Performed by: PATHOLOGY

## 2022-01-13 PROCEDURE — D9220A PRA ANESTHESIA: ICD-10-PCS | Mod: HCNC,ANES,, | Performed by: ANESTHESIOLOGY

## 2022-01-13 PROCEDURE — D9220A PRA ANESTHESIA: ICD-10-PCS | Mod: HCNC,CRNA,, | Performed by: NURSE ANESTHETIST, CERTIFIED REGISTERED

## 2022-01-13 PROCEDURE — D9220A PRA ANESTHESIA: Mod: HCNC,ANES,, | Performed by: ANESTHESIOLOGY

## 2022-01-13 PROCEDURE — 43239 EGD BIOPSY SINGLE/MULTIPLE: CPT | Mod: HCNC | Performed by: INTERNAL MEDICINE

## 2022-01-13 PROCEDURE — 88341 PR IHC OR ICC EACH ADD'L SINGLE ANTIBODY  STAINPR: ICD-10-PCS | Mod: 26,HCNC,, | Performed by: PATHOLOGY

## 2022-01-13 PROCEDURE — 25000003 PHARM REV CODE 250: Mod: HCNC | Performed by: ANESTHESIOLOGY

## 2022-01-13 PROCEDURE — 43239 EGD BIOPSY SINGLE/MULTIPLE: CPT | Mod: 59,HCNC,, | Performed by: INTERNAL MEDICINE

## 2022-01-13 PROCEDURE — 27201012 HC FORCEPS, HOT/COLD, DISP: Mod: HCNC | Performed by: INTERNAL MEDICINE

## 2022-01-13 PROCEDURE — 88305 TISSUE EXAM BY PATHOLOGIST: CPT | Mod: HCNC | Performed by: PATHOLOGY

## 2022-01-13 PROCEDURE — 27202087 HC PROBE, APC: Mod: HCNC | Performed by: INTERNAL MEDICINE

## 2022-01-13 PROCEDURE — 88305 TISSUE EXAM BY PATHOLOGIST: CPT | Mod: 26,HCNC,, | Performed by: PATHOLOGY

## 2022-01-13 PROCEDURE — 37000009 HC ANESTHESIA EA ADD 15 MINS: Mod: HCNC | Performed by: INTERNAL MEDICINE

## 2022-01-13 PROCEDURE — 43259 PR ENDOSCOPIC ULTRASOUND EXAM: ICD-10-PCS | Mod: HCNC,,, | Performed by: INTERNAL MEDICINE

## 2022-01-13 PROCEDURE — D9220A PRA ANESTHESIA: Mod: HCNC,CRNA,, | Performed by: NURSE ANESTHETIST, CERTIFIED REGISTERED

## 2022-01-13 PROCEDURE — 88342 CHG IMMUNOCYTOCHEMISTRY: ICD-10-PCS | Mod: 26,HCNC,, | Performed by: PATHOLOGY

## 2022-01-13 PROCEDURE — 63600175 PHARM REV CODE 636 W HCPCS: Mod: HCNC | Performed by: NURSE ANESTHETIST, CERTIFIED REGISTERED

## 2022-01-13 PROCEDURE — 88342 IMHCHEM/IMCYTCHM 1ST ANTB: CPT | Mod: HCNC | Performed by: PATHOLOGY

## 2022-01-13 PROCEDURE — 88341 IMHCHEM/IMCYTCHM EA ADD ANTB: CPT | Mod: HCNC | Performed by: PATHOLOGY

## 2022-01-13 PROCEDURE — 25000003 PHARM REV CODE 250: Mod: HCNC | Performed by: NURSE ANESTHETIST, CERTIFIED REGISTERED

## 2022-01-13 PROCEDURE — 43239 PR EGD, FLEX, W/BIOPSY, SGL/MULTI: ICD-10-PCS | Mod: 59,HCNC,, | Performed by: INTERNAL MEDICINE

## 2022-01-13 PROCEDURE — 43259 EGD US EXAM DUODENUM/JEJUNUM: CPT | Mod: HCNC,,, | Performed by: INTERNAL MEDICINE

## 2022-01-13 PROCEDURE — 37000008 HC ANESTHESIA 1ST 15 MINUTES: Mod: HCNC | Performed by: INTERNAL MEDICINE

## 2022-01-13 RX ORDER — PROPOFOL 10 MG/ML
VIAL (ML) INTRAVENOUS CONTINUOUS PRN
Status: DISCONTINUED | OUTPATIENT
Start: 2022-01-13 | End: 2022-01-13

## 2022-01-13 RX ORDER — LIDOCAINE HYDROCHLORIDE 20 MG/ML
INJECTION INTRAVENOUS
Status: DISCONTINUED | OUTPATIENT
Start: 2022-01-13 | End: 2022-01-13

## 2022-01-13 RX ORDER — DEXMEDETOMIDINE HYDROCHLORIDE 100 UG/ML
INJECTION, SOLUTION INTRAVENOUS
Status: DISCONTINUED | OUTPATIENT
Start: 2022-01-13 | End: 2022-01-13

## 2022-01-13 RX ORDER — SODIUM CHLORIDE 9 MG/ML
INJECTION, SOLUTION INTRAVENOUS CONTINUOUS
Status: CANCELLED | OUTPATIENT
Start: 2022-01-13

## 2022-01-13 RX ORDER — SODIUM CHLORIDE 0.9 % (FLUSH) 0.9 %
10 SYRINGE (ML) INJECTION
Status: CANCELLED | OUTPATIENT
Start: 2022-01-13

## 2022-01-13 RX ORDER — PROPOFOL 10 MG/ML
VIAL (ML) INTRAVENOUS
Status: DISCONTINUED | OUTPATIENT
Start: 2022-01-13 | End: 2022-01-13

## 2022-01-13 RX ORDER — KETAMINE HCL IN 0.9 % NACL 50 MG/5 ML
SYRINGE (ML) INTRAVENOUS
Status: DISCONTINUED | OUTPATIENT
Start: 2022-01-13 | End: 2022-01-13

## 2022-01-13 RX ORDER — PHENYLEPHRINE HYDROCHLORIDE 10 MG/ML
INJECTION INTRAVENOUS
Status: DISCONTINUED | OUTPATIENT
Start: 2022-01-13 | End: 2022-01-13

## 2022-01-13 RX ADMIN — Medication 125 MCG/KG/MIN: at 10:01

## 2022-01-13 RX ADMIN — DEXMEDETOMIDINE HYDROCHLORIDE 2 MCG: 100 INJECTION, SOLUTION, CONCENTRATE INTRAVENOUS at 10:01

## 2022-01-13 RX ADMIN — LIDOCAINE HYDROCHLORIDE 100 MG: 20 INJECTION, SOLUTION INTRAVENOUS at 10:01

## 2022-01-13 RX ADMIN — Medication 10 MG: at 10:01

## 2022-01-13 RX ADMIN — GLYCOPYRROLATE 0.1 MG: 0.2 INJECTION, SOLUTION INTRAMUSCULAR; INTRAVITREAL at 10:01

## 2022-01-13 RX ADMIN — DEXMEDETOMIDINE HYDROCHLORIDE 4 MCG: 100 INJECTION, SOLUTION, CONCENTRATE INTRAVENOUS at 10:01

## 2022-01-13 RX ADMIN — PHENYLEPHRINE HYDROCHLORIDE 100 MCG: 10 INJECTION INTRAVENOUS at 10:01

## 2022-01-13 RX ADMIN — Medication 5 MG: at 10:01

## 2022-01-13 RX ADMIN — PROPOFOL 30 MG: 10 INJECTION, EMULSION INTRAVENOUS at 10:01

## 2022-01-13 RX ADMIN — SODIUM CHLORIDE: 0.9 INJECTION, SOLUTION INTRAVENOUS at 10:01

## 2022-01-13 NOTE — DISCHARGE INSTRUCTIONS
Patient Education       Upper GI Endoscopy Discharge Instructions   About this topic   This procedure is done to view your upper gastrointestinal (GI) tract. This includes your throat and food pipe (esophagus). It also includes your stomach and the first part of the small bowel. Some people have this test for problems like coughing or throwing up blood. Other people may be having bad belly pain or blood in their stool. You may be having trouble swallowing or problems with acid reflux.  Doctors often use this test to look for problems like:  · Ulcers  · Cancer or tumor growths  · Internal bleeding  · Swelling  · Inflammation  · Infection  · Chapman's esophagus  · Gastroesophageal reflux disease or GERD  · Swallowing problems     What care is needed at home?   · Ask your doctor what you need to do when you go home. Make sure you ask questions if you do not understand what the doctor says. This way you will know what you need to do.  · Your throat may feel sore. Your belly may also feel bloated. Your doctor may give you drugs to help with pain.  · Talk to your doctor about when it is safe for you to go back to eating your normal diet and taking your drugs. You can drink fluid once the numbing drugs in your throat wear off.  What follow-up care is needed?   · Your doctor may ask you to make visits to the office to check on your progress. Be sure to keep these visits.  · The results of this test may help your doctor understand what kind of problem you have with your upper GI tract. Together you can make a plan for more care.  What drugs may be needed?   The doctor may order drugs to:  · Help with pain  · Decrease the acid in your stomach  Will physical activity be limited?   You may need to limit your activity for the rest of the day. After that, you will likely be able to go back to your normal activities.  What changes to diet are needed?   Soft foods like pudding or soups may be easier to eat at first. Ask your doctor  if you need to make any changes to your diet.  What problems could happen?   · Painful swallowing  · Upset stomach  · Injury to food pipe   · Throwing up  · Tear in the esophagus  When do I need to call the doctor?   · Signs of infection. These include a fever of 100.4°F (38°C) or higher, chills.  · Very bad belly pain  · Throwing up blood  · Your belly is hard and swollen  · Trouble swallowing or breathing  · Upset stomach and throwing up  · Bloody or black tarry stools  · Cough, shortness of breath, or chest pain  · A crunching feeling under the skin in your neck  · You are not feeling better in 2 to 3 days or you are feeling worse  Teach Back: Helping You Understand   The Teach Back Method helps you understand the information we are giving you. After you talk with the staff, tell them in your own words what you learned. This helps to make sure the staff has described each thing clearly. It also helps to explain things that may have been confusing. Before going home, make sure you can do these:  · I can tell you about my procedure.  · I can tell you what changes I need to make with my diet or drugs.  · I can tell you what I will do if I have very bad belly pain, throwing up blood, or my belly is hard and swollen.  Where can I learn more?   [employer] Association  https://www.gastro.org/practice-guidance/gi-patient-center/topic/upper-gi-endoscopy   Last Reviewed Date   2021-10-05  Consumer Information Use and Disclaimer   This information is not specific medical advice and does not replace information you receive from your health care provider. This is only a brief summary of general information. It does NOT include all information about conditions, illnesses, injuries, tests, procedures, treatments, therapies, discharge instructions or life-style choices that may apply to you. You must talk with your health care provider for complete information about your health and treatment options. This  information should not be used to decide whether or not to accept your health care providers advice, instructions or recommendations. Only your health care provider has the knowledge and training to provide advice that is right for you.  Copyright   Copyright © 2021 UpToDate, Inc. and its affiliates and/or licensors. All rights reserved.  Patient Education       Endoscopic Ultrasound   Why is this procedure done?   An endoscopic ultrasound is a procedure done to look at the linings of the digestive tract, gallbladder, and pancreas. The digestive tract includes the esophagus, stomach, and the small and large intestines. This ultrasound uses sound waves to make pictures of the digestive tract. This test is done to look for:  · Abnormal growths  · Gallstones  · Irritation of an area  · Blood clots  · Evaluate stages of cancer     What will the results be?   A special doctor will look at the pictures to see if there is anything wrong. Your doctor will get the results and talk about them with you.  What happens before the procedure?   · Your doctor will take your history and do an exam.  · Talk to your doctor about:  ? All the drugs you are taking. Be sure to include all prescription, over-the-counter (OTC) drugs, and herbal supplements. Tell the doctor about any drug allergy. Bring a list of drugs you take with you.  ? Any bleeding problems. Be sure to tell your doctor if you are taking any drugs that may cause bleeding. Some of these are warfarin, rivaroxaban, apixaban, ticagrelor, clopidogrel, ketorolac, ibuprofen, naproxen, or aspirin. Certain vitamins and herbs, such as garlic and fish oil, may also add to the risk for bleeding. You may need to stop these drugs as well. Talk to your doctor about them.  · Follow your doctor's orders about eating or drinking before the test. You will be asked to stop eating or drinking a certain number of hours before the test.  · Your doctor may order a process to empty your bowel  before the test.  · You will not be allowed to drive right away after the procedure. Ask a family member or a friend to drive you home.  What happens during the procedure?   · Once you are in the procedure room, the staff will put an IV in your arm to give you fluids and drugs if needed.  · The doctor will give you drugs to help you relax and stay pain free.  · The staff will help you lie on your left side.  ? For an upper endoscope:  § The doctor will spray a drug in your throat to numb the area. You may get oxygen to help you breathe.  § The staff may place a bite-block in your mouth to keep it open during the procedure.  § The doctor will put a thin tube in your mouth and down your esophagus.  ? For a lower endoscope:  § The doctor may place the scope in your lower digestive tract through the anus.  · When the scope is in position, your doctor will look around and take pictures.  · The doctor will slide small surgical tools through the endoscope if taking a sample.  · This procedure may take 30 to 45 minutes.  What happens after the procedure?   · You will be moved to a Recovery Room after the procedure. This will give you time to wake up.  · You may feel bloated after the procedure.  · If you had a biopsy, the sample will be sent to the lab for testing. Ask your doctor when you can get the results.  · Your doctor will tell you when you can go home after the procedure.  What care is needed at home?   · Ask your doctor what you need to do when you go home. Make sure you ask questions if you do not understand what the doctor says. This way you will know what you need to do.  · Your throat may feel sore for a few days. You may rinse your mouth and throat out after the procedure. Rinse with mouthwash or salt water.  · Do not drive for 24 hours after the procedure.  · Ask your doctor when you may go back to your normal activities.  What follow-up care is needed?   · Your doctor may ask you to make visits to the office  to check on your progress. Be sure to keep these visits.  · The results may help your doctor find out what is wrong. Together you can make a plan for care.  What problems could happen?   · Injury to the esophagus  · Bleeding  · Infection  · Reaction to the sedation  · Liquid in your lung  · Sore throat  · Tear in colon lining  Last Reviewed Date   2021-04-14  Consumer Information Use and Disclaimer   This information is not specific medical advice and does not replace information you receive from your health care provider. This is only a brief summary of general information. It does NOT include all information about conditions, illnesses, injuries, tests, procedures, treatments, therapies, discharge instructions or life-style choices that may apply to you. You must talk with your health care provider for complete information about your health and treatment options. This information should not be used to decide whether or not to accept your health care providers advice, instructions or recommendations. Only your health care provider has the knowledge and training to provide advice that is right for you.  Copyright   Copyright © 2021 UpToDate, Inc. and its affiliates and/or licensors. All rights reserved.

## 2022-01-13 NOTE — TELEPHONE ENCOUNTER
Please call patient to let her know that her labs are stable compared to prior and CXR is stable as well  She should schedule an appointment with cardiology as discussed at appt   Referral was placed previously but does not look like it was scheduled

## 2022-01-13 NOTE — ANESTHESIA POSTPROCEDURE EVALUATION
Anesthesia Post Evaluation    Patient: DARCY Coats    Procedure(s) Performed: Procedure(s) (LRB):  ULTRASOUND, UPPER GI TRACT, ENDOSCOPIC (N/A)  EGD (ESOPHAGOGASTRODUODENOSCOPY) (N/A)    Final Anesthesia Type: general      Patient location during evaluation: PACU  Patient participation: Yes- Able to Participate  Level of consciousness: awake and alert and oriented  Post-procedure vital signs: reviewed and stable  Pain management: adequate  Airway patency: patent    PONV status at discharge: No PONV  Anesthetic complications: no      Cardiovascular status: hemodynamically stable  Respiratory status: unassisted, spontaneous ventilation and room air  Hydration status: euvolemic  Follow-up not needed.          Vitals Value Taken Time   /78 01/13/22 1146   Temp 36.6 °C (97.8 °F) 01/13/22 1100   Pulse 71 01/13/22 1151   Resp 22 01/13/22 1130   SpO2 97 % 01/13/22 1151   Vitals shown include unvalidated device data.      No case tracking events are documented in the log.      Pain/Hector Score: Hector Score: 9 (1/13/2022 11:15 AM)

## 2022-01-13 NOTE — ANESTHESIA PREPROCEDURE EVALUATION
01/13/2022  DARCY Coats is a 71 y.o., female.  Pre-operative evaluation for ULTRASOUND, UPPER GI TRACT, ENDOSCOPIC (N/A), EGD (ESOPHAGOGASTRODUODENOSCOPY) (N/A)    Chief Complaint: gastric adenoCA    PMH:  Undergoing chemo for gastric CA  Morbid obesity  BMI 51  Chronic diastolic congestive heart failure (1/27/2021)  Hyperlipidemia  Hypertension- controlled  Sleep apnea using CPAP regularly  Cath 2/21 clean coronaries  Past Surgical History:   Procedure Laterality Date    APPENDECTOMY      CARDIAC SURGERY      CATARACT EXTRACTION W/  INTRAOCULAR LENS IMPLANT Bilateral     MFIOL OU    CORONARY ANGIOGRAPHY Bilateral 2/24/2021    Procedure: ANGIOGRAM, CORONARY ARTERY;  Surgeon: Cresencio Ridley MD;  Location: [facility] CATH LAB;  Service: Cardiology;  Laterality: Bilateral;  Covid test needed - ok per Danay MORENOU. Pt. w/o transportation or family    ENDOSCOPIC ULTRASOUND OF UPPER GASTROINTESTINAL TRACT N/A 3/17/2021    Procedure: ULTRASOUND, UPPER GI TRACT, ENDOSCOPIC;  Surgeon: Gerald Anaya MD;  Location: [facility] ([facility]);  Service: Endoscopy;  Laterality: N/A;  Pt unable to get a ride for swab. Will do rapid test at 8:30 - ttr    ESOPHAGOGASTRODUODENOSCOPY N/A 2/5/2021    Procedure: EGD (ESOPHAGOGASTRODUODENOSCOPY);  Surgeon: Matthew Hernadez MD;  Location: [facility] ([facility]ND [facility]);  Service: Endoscopy;  Laterality: N/A;  BMI-58    Wt: 361#     COVID test at [facility] on 2/2-GT    ESOPHAGOGASTRODUODENOSCOPY N/A 3/17/2021    Procedure: EGD (ESOPHAGOGASTRODUODENOSCOPY);  Surgeon: Gerald Anaya MD;  Location: [facility] (2ND [facility]LR);  Service: Endoscopy;  Laterality: N/A;    ESOPHAGOGASTRODUODENOSCOPY N/A 5/25/2021    Procedure: EGD (ESOPHAGOGASTRODUODENOSCOPY);  Surgeon: Kevin Carballo MD;  Location: [facility] (2ND FLR);  Service: Endoscopy;  Laterality: N/A;  ESD 2 hours  Full COVID vaccination on  file. ttr    EYE SURGERY      INJECTION OF ANESTHETIC AGENT AROUND NERVE Left 7/10/2018    Procedure: BLOCK, NERVE;  Surgeon: Samantha Vidal MD;  Location: [facility] PAIN MGT;  Service: Pain Management;  Laterality: Left;  Genicular     INJECTION OF ANESTHETIC AGENT AROUND NERVE Left 7/19/2019    Procedure: Block, Nerve NERVE BLOCK GENICULAR WITH PHENOL 6%;  Surgeon: Samantha Vidal MD;  Location: [facility] PAIN MGT;  Service: Pain Management;  Laterality: Left;  NEEDS CONSENT    INSERTION OF TUNNELED CENTRAL VENOUS CATHETER (CVC) WITH SUBCUTANEOUS PORT N/A 7/9/2021    Procedure: INSERTION, PORT-A-CATH;  Surgeon: Mario Paz MD;  Location: [facility] CATH LAB;  Service: Radiology;  Laterality: N/A;  PORT PLACEMENT    RADIOFREQUENCY ABLATION Left 6/19/2020    Procedure: RADIOFREQUENCY ABLATION LEFT GENICULAR;  Surgeon: Tevin Clark MD;  Location: [facility] PAIN MGT;  Service: Pain Management;  Laterality: Left;  Left Genicular RFA    RADIOFREQUENCY ABLATION Left 1/22/2021    Procedure: RADIOFREQUENCY ABLATION LEFT GENICULAR;  Surgeon: Tevin Clark MD;  Location: [facility] PAIN MGT;  Service: Pain Management;  Laterality: Left;    RADIOFREQUENCY ABLATION Left 7/30/2021    Procedure: RADIOFREQUENCY ABLATION, GENICULAR COOLED NEED CONSENT;  Surgeon: Tevin Clark MD;  Location: [facility] PAIN MGT;  Service: Pain Management;  Laterality: Left;    TONSILLECTOMY           Vital Signs Range (Last 24H):  Temp:  [36.9 °C (98.5 °F)]   Pulse:  [67]   BP: (126)/(58)   SpO2:  [97 %]       CBC:     Recent Labs   Lab 01/12/22  1107   WBC 10.88   RBC 3.46*   HGB 10.8*   HCT 35.3*      *   MCH 31.2*   MCHC 30.6*       CMP:   Recent Labs   Lab 01/12/22  1107      K 4.0      CO2 28   BUN 9   CREATININE 0.6   *   CALCIUM 9.0   ALBUMIN 2.5*   PROT 5.7*   ALKPHOS 105   ALT 10   AST 19   BILITOT 0.6       INR:  No results for input(s): PT, INR, PROTIME, APTT in the last 720 hours.      Diagnostic  Studies:      EKD Echo:   Impression    Wall motion at peak stress is consistent with myocardial ischemia.   EF 59%    Anesthesia Evaluation    I have reviewed the Patient Summary Reports.    I have reviewed the Nursing Notes. I have reviewed the NPO Status.   I have reviewed the Medications.     Review of Systems  Anesthesia Hx:  No problems with previous Anesthesia    Social:  Non-Smoker    Cardiovascular:   ECG has been reviewed.        Physical Exam  General:  Morbid Obesity    Airway/Jaw/Neck:  Airway Findings: Mouth Opening: Normal Tongue: Normal  General Airway Assessment: Possible difficult mask airway  Mallampati: I  TM Distance: Normal, at least 6 cm  Jaw/Neck Findings:  Neck ROM: Normal ROM      Dental:  Dental Findings: Upper Dentures    Chest/Lungs:  Chest/Lungs Findings: Normal Respiratory Rate     Heart/Vascular:  Heart Findings:       Mental Status:  Mental Status Findings:  Cooperative, Alert and Oriented         Anesthesia Plan  Type of Anesthesia, risks & benefits discussed:  Anesthesia Type:  general    Patient's Preference:   Plan Factors:          Intra-op Monitoring Plan: standard ASA monitors  Intra-op Monitoring Plan Comments:   Post Op Pain Control Plan:   Post Op Pain Control Plan Comments:     Induction:   IV  Beta Blocker:  Patient is not currently on a Beta-Blocker (No further documentation required).       Informed Consent: Patient understands risks and agrees with Anesthesia plan.  Questions answered. Anesthesia consent signed with patient.  ASA Score: 3     Day of Surgery Review of History & Physical:    H&P update referred to the surgeon.         Ready For Surgery From Anesthesia Perspective.

## 2022-01-13 NOTE — TRANSFER OF CARE
"Anesthesia Transfer of Care Note    Patient: DARCY Coats    Procedure(s) Performed: Procedure(s) (LRB):  ULTRASOUND, UPPER GI TRACT, ENDOSCOPIC (N/A)  EGD (ESOPHAGOGASTRODUODENOSCOPY) (N/A)    Patient location: [facility]    Anesthesia Type: general    Transport from OR: Transported from OR on 2-3 L/min O2 by NC with adequate spontaneous ventilation    Post pain: adequate analgesia    Post assessment: no apparent anesthetic complications    Post vital signs: stable    Level of consciousness: awake, alert and oriented    Nausea/Vomiting: no nausea/vomiting    Complications: none    Transfer of care protocol was followed      Last vitals:   Visit Vitals  BP (!) 143/65 (BP Location: Left arm, Patient Position: Lying)   Pulse 84   Temp 36.8 °C (98.2 °F) (Temporal)   Resp 16   Ht 5' 6" (1.676 m)   Wt (!) 145.2 kg (320 lb)   SpO2 96%   Breastfeeding No   BMI 51.65 kg/m²     "

## 2022-01-13 NOTE — BRIEF OP NOTE
Discharge Summary/Instructions after an Endoscopic Procedure    Patient Name: DARCY Coats  Patient MRN: [id number]  Patient YOB: 1950 Thursday, January 13, 2022  Kevin Carballo MD    Dear patient,  As a result of recent federal legislation (The Federal Cures Act), you may receive lab or pathology results from your procedure in your [facility] account before your physician is able to contact you. Your physician or their representative will relay the results to you with their recommendations at their soonest availability.  Thank you,    RESTRICTIONS:  During your procedure today, you received medications for sedation.  These medications may affect your judgment, balance and coordination.  Therefore, for 24 hours, you have the following restrictions:     - DO NOT drive a car, operate machinery, make legal/financial decisions, sign important papers or drink alcohol.      ACTIVITY:  Today: no heavy lifting, straining or running due to procedural sedation/anesthesia.  The following day: return to full activity including work.    DIET:  Eat and drink normally unless instructed otherwise.     TREATMENT FOR COMMON SIDE EFFECTS:  - Mild abdominal pain, nausea, belching, bloating or excessive gas:  rest, eat lightly and use a heating pad.  - Sore Throat: treat with throat lozenges and/or gargle with warm salt water.  - Because air was used during the procedure, expelling large amounts of air from your rectum or belching is normal.  - If a bowel prep was taken, you may not have a bowel movement for 1-3 days.  This is normal.      SYMPTOMS TO WATCH FOR AND REPORT TO YOUR PHYSICIAN:  1. Abdominal pain or bloating, other than gas cramps.  2. Chest pain.  3. Back pain.  4. Signs of infection such as: chills or fever occurring within 24 hours after the procedure.  5. Rectal bleeding, which would show as bright red, maroon, or black stools. (A tablespoon of blood from the rectum is not serious, especially if hemorrhoids  are present.)  6. Vomiting.  7. Weakness or dizziness.      GO DIRECTLY TO THE NEAREST EMERGENCY ROOM IF YOU HAVE ANY OF THE FOLLOWING:     Difficulty breathing              Chills and/or fever over 101 F   Persistent vomiting and/or vomiting blood   Severe abdominal pain   Severe chest pain   Black, tarry stools   Bleeding- more than one tablespoon   Any other symptom or condition that you feel may need urgent attention    Your doctor recommends these additional instructions:  If any biopsies were taken, your doctors clinic will contact you in 1 to 2 weeks with any results.    - Discharge patient to home (ambulatory).   - Await path results.   - Repeat the upper endoscopic ultrasound in 6 months for surveillance and for surveillance based on pathology results.    For questions, problems or results please call your physician - Kevin Carballo MD at Work:  [phone].    [facility], EMERGENCY ROOM PHONE NUMBER: [phone]    IF A COMPLICATION OR EMERGENCY SITUATION ARISES AND YOU ARE UNABLE TO REACH YOUR PHYSICIAN - GO DIRECTLY TO THE EMERGENCY ROOM.

## 2022-01-13 NOTE — H&P
History & Physical - Short Stay  Gastroenterology      SUBJECTIVE:     Procedure: EUS    Chief Complaint/Indication for Procedure: Gastric cancer    History of Present Illness:  Patient is a 71 y.o. female presents with gastric cancer S/P ESD (T1b) on chemo here for response evaluation.     PTA Medications   Medication Sig    amLODIPine (NORVASC) 10 MG tablet Take 1 tablet (10 mg total) by mouth once daily.    atorvastatin (LIPITOR) 20 MG tablet Take 1 tablet (20 mg total) by mouth once daily.    citalopram (CELEXA) 10 MG tablet Take 1 tablet (10 mg total) by mouth once daily.    ferrous gluconate (FERGON) 324 MG tablet Take 1 tablet (324 mg total) by mouth daily with breakfast.    gabapentin (NEURONTIN) 300 MG capsule Take 2 capsules (600 mg total) by mouth 3 (three) times daily.    lisinopriL (PRINIVIL,ZESTRIL) 40 MG tablet Take 1 tablet (40 mg total) by mouth once daily.    acetaminophen (TYLENOL) 500 MG tablet     apixaban (ELIQUIS) 5 mg Tab Take 1 tablet (5 mg total) by mouth 2 (two) times daily.    B-complex with vitamin C (Z-BEC OR EQUIV) tablet Take 1 tablet by mouth once daily.     CALCIUM CITRATE-VITAMIN D2 ORAL Take 1 tablet by mouth once daily.     LIDOcaine-prilocaine (EMLA) cream Apply topically as needed (prior to port access).    multivitamin with minerals tablet Take 1 tablet by mouth once daily.     oxyCODONE-acetaminophen (PERCOCET) 5-325 mg per tablet Take 1 tablet by mouth every 8 (eight) hours as needed for Pain.    semaglutide (OZEMPIC) 1 mg/dose (2 mg/1.5 mL) PnIj Inject 1 mg into the skin every 7 days.    sucralfate (CARAFATE) 1 gram tablet 1 tablet dissolved in 10 ml of water orally twice a day.       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    GERD (gastroesophageal reflux disease)     Glaucoma suspect     History of gastric ulcer     Hyperlipidemia     Hypertension     Hypothyroidism     Morbid obesity     Neuromuscular disorder     Sleep apnea     Thyroid disease      Past Surgical History:   Procedure Laterality Date    APPENDECTOMY      CARDIAC SURGERY      CATARACT EXTRACTION W/  INTRAOCULAR LENS IMPLANT Bilateral     MFIOL OU    CORONARY ANGIOGRAPHY Bilateral 2/24/2021    Procedure: ANGIOGRAM, CORONARY ARTERY;  Surgeon: Cresencio Ridley MD;  Location: [facility] CATH LAB;  Service: Cardiology;  Laterality: Bilateral;  Covid test needed - ok per Danay SSCU. Pt. w/o transportation or family    ENDOSCOPIC ULTRASOUND OF UPPER GASTROINTESTINAL TRACT N/A 3/17/2021    Procedure: ULTRASOUND, UPPER GI TRACT, ENDOSCOPIC;  Surgeon: Gerald Anaya MD;  Location: [facility] (2ND FLR);  Service: Endoscopy;  Laterality: N/A;  Pt unable to get a ride for swab. Will do rapid test at 8:30 - ttr    ESOPHAGOGASTRODUODENOSCOPY N/A 2/5/2021    Procedure: EGD (ESOPHAGOGASTRODUODENOSCOPY);  Surgeon: Matthew Hernadez MD;  Location: [facility] FLR);  Service: Endoscopy;  Laterality: N/A;  BMI-58    Wt: 361#     COVID test at [facility] on 2/2-GT    ESOPHAGOGASTRODUODENOSCOPY N/A 3/17/2021    Procedure: EGD (ESOPHAGOGASTRODUODENOSCOPY);  Surgeon: Gerald Anaya MD;  Location: [facility] FLR);  Service: Endoscopy;  Laterality: N/A;    ESOPHAGOGASTRODUODENOSCOPY N/A 5/25/2021    Procedure: EGD (ESOPHAGOGASTRODUODENOSCOPY);  Surgeon: Kevin Carballo MD;  Location: [facility] FLR);  Service: Endoscopy;  Laterality: N/A;  ESD 2 hours  Full COVID vaccination on file. ttr    EYE SURGERY      INJECTION OF ANESTHETIC AGENT AROUND NERVE Left 7/10/2018    Procedure: BLOCK, NERVE;  Surgeon: Samantha Vidal MD;  Location: [facility] PAIN MGT;  Service: Pain Management;  Laterality: Left;   Genicular     INJECTION OF ANESTHETIC AGENT AROUND NERVE Left 2019    Procedure: Block, Nerve NERVE BLOCK GENICULAR WITH PHENOL 6%;  Surgeon: Samantha Vidal MD;  Location: [facility] PAIN MGT;  Service: Pain Management;  Laterality: Left;  NEEDS CONSENT    INSERTION OF TUNNELED CENTRAL VENOUS CATHETER (CVC) WITH SUBCUTANEOUS PORT N/A 2021    Procedure: INSERTION, PORT-A-CATH;  Surgeon: Mario Paz MD;  Location: [facility] CATH LAB;  Service: Radiology;  Laterality: N/A;  PORT PLACEMENT    RADIOFREQUENCY ABLATION Left 2020    Procedure: RADIOFREQUENCY ABLATION LEFT GENICULAR;  Surgeon: Tevin Clark MD;  Location: [facility] PAIN MGT;  Service: Pain Management;  Laterality: Left;  Left Genicular RFA    RADIOFREQUENCY ABLATION Left 2021    Procedure: RADIOFREQUENCY ABLATION LEFT GENICULAR;  Surgeon: Tevin Clark MD;  Location: [facility] PAIN MGT;  Service: Pain Management;  Laterality: Left;    RADIOFREQUENCY ABLATION Left 2021    Procedure: RADIOFREQUENCY ABLATION, GENICULAR COOLED NEED CONSENT;  Surgeon: Tevin Clark MD;  Location: [facility] PAIN MGT;  Service: Pain Management;  Laterality: Left;    TONSILLECTOMY       Family History   Problem Relation Age of Onset    No Known Problems Mother     No Known Problems Father     Colon cancer Neg Hx     Esophageal cancer Neg Hx      Social History     Tobacco Use    Smoking status: Current Some Day Smoker     Packs/day: 0.25     Years: 50.00     Pack years: 12.50     Types: Cigarettes     Last attempt to quit: 2020     Years since quittin.0    Smokeless tobacco: Never Used   Substance Use Topics    Alcohol use: Yes     Comment: raely    Drug use: No       Review of Systems:  Constitutional: no fever or chills  Respiratory: no cough or shortness of breath  Cardiovascular: no chest pain or palpitations    OBJECTIVE:     Vital Signs (Most Recent)  Temp: 98.2 °F (36.8 °C) (22)  Pulse: 84 (22)  Resp: 16 (22  0935)  BP: (!) 143/65 (01/13/22 0935)  SpO2: 96 % (01/13/22 0935)    Physical Exam:  General: well developed  Lungs:  normal respiratory effort  Heart: regular rate, S1, S2 normal    Laboratory  CBC:   Recent Labs   Lab 01/12/22  1107   WBC 10.88   RBC 3.46*   HGB 10.8*   HCT 35.3*      *   MCH 31.2*   MCHC 30.6*     CMP:   Recent Labs   Lab 01/12/22  1107   *   CALCIUM 9.0   ALBUMIN 2.5*   PROT 5.7*      K 4.0   CO2 28      BUN 9   CREATININE 0.6   ALKPHOS 105   ALT 10   AST 19   BILITOT 0.6     Coagulation: No results for input(s): LABPROT, INR, APTT in the last 168 hours.      Diagnostic Results:      ASSESSMENT/PLAN:     Gastric cancer S/P ESD (T1b) on chemo here for response evaluation.    Plan: EUS    Anesthesia Plan: MAC    ASA Grade: ASA 3 - Patient with moderate systemic disease with functional limitations       The impression and plan was discussed in detail with the patient. All questions have been answered and the patient voices understanding of our plan at this point. The risk of the procedure was discussed in detail which includes but not limited to bleeding, infection, perforation in some cases requiring surgery with its spectrum of complications.

## 2022-01-13 NOTE — [employer] PATIENT INSTRUCTIONS
Discharge Summary/Instructions after an Endoscopic Procedure  Patient Name: DARCY Coats  Patient MRN: [id number]  Patient YOB: 1950 Thursday, January 13, 2022  Kevin Carballo MD  Dear patient,  As a result of recent federal legislation (The Federal Cures Act), you may   receive lab or pathology results from your procedure in your [facility]   account before your physician is able to contact you. Your physician or   their representative will relay the results to you with their   recommendations at their soonest availability.  Thank you,  RESTRICTIONS:  During your procedure today, you received medications for sedation.  These   medications may affect your judgment, balance and coordination.  Therefore,   for 24 hours, you have the following restrictions:   - DO NOT drive a car, operate machinery, make legal/financial decisions,   sign important papers or drink alcohol.    ACTIVITY:  Today: no heavy lifting, straining or running due to procedural   sedation/anesthesia.  The following day: return to full activity including work.  DIET:  Eat and drink normally unless instructed otherwise.     TREATMENT FOR COMMON SIDE EFFECTS:  - Mild abdominal pain, nausea, belching, bloating or excessive gas:  rest,   eat lightly and use a heating pad.  - Sore Throat: treat with throat lozenges and/or gargle with warm salt   water.  - Because air was used during the procedure, expelling large amounts of air   from your rectum or belching is normal.  - If a bowel prep was taken, you may not have a bowel movement for 1-3 days.    This is normal.  SYMPTOMS TO WATCH FOR AND REPORT TO YOUR PHYSICIAN:  1. Abdominal pain or bloating, other than gas cramps.  2. Chest pain.  3. Back pain.  4. Signs of infection such as: chills or fever occurring within 24 hours   after the procedure.  5. Rectal bleeding, which would show as bright red, maroon, or black stools.   (A tablespoon of blood from the rectum is not serious, especially if    hemorrhoids are present.)  6. Vomiting.  7. Weakness or dizziness.  GO DIRECTLY TO THE NEAREST EMERGENCY ROOM IF YOU HAVE ANY OF THE FOLLOWING:      Difficulty breathing              Chills and/or fever over 101 F   Persistent vomiting and/or vomiting blood   Severe abdominal pain   Severe chest pain   Black, tarry stools   Bleeding- more than one tablespoon   Any other symptom or condition that you feel may need urgent attention  Your doctor recommends these additional instructions:  If any biopsies were taken, your doctors clinic will contact you in 1 to 2   weeks with any results.  - Discharge patient to home (ambulatory).   - Await path results.   - Repeat the upper endoscopic ultrasound in 6 months for surveillance and   for surveillance based on pathology results.  For questions, problems or results please call your physician - Kevin Carballo MD at Work:  [phone].  [facility], EMERGENCY ROOM PHONE NUMBER: [phone]  IF A COMPLICATION OR EMERGENCY SITUATION ARISES AND YOU ARE UNABLE TO REACH   YOUR PHYSICIAN - GO DIRECTLY TO THE EMERGENCY ROOM.  Kevin Carballo MD  1/13/2022 11:26:50 AM  This report has been verified and signed electronically.  Dear patient,  As a result of recent federal legislation (The Federal Cures Act), you may   receive lab or pathology results from your procedure in your [facility]   account before your physician is able to contact you. Your physician or   their representative will relay the results to you with their   recommendations at their soonest availability.  Thank you,  [employer]ATION

## 2022-01-14 NOTE — TELEPHONE ENCOUNTER
----- Message from Mignon Garsia sent at 1/14/2022 11:29 AM CST -----  Regarding: Returned call  Contact: patient [phone]  Patient is returning a phone call.  Who left a message for the patient: Yuliet OLIVA  Does patient know what this is regarding:    Would you like a call back, or a response through your [facility] portal?:   please call  Comments:

## 2022-01-19 RX ORDER — SEMAGLUTIDE 1.34 MG/ML
INJECTION, SOLUTION SUBCUTANEOUS
Refills: 0 | OUTPATIENT
Start: 2022-01-19

## 2022-01-19 NOTE — TELEPHONE ENCOUNTER
Care Due:                  Date            Visit Type   Department     Provider  --------------------------------------------------------------------------------                                             [facility]  Last Visit: 08-      None         MEDICINE       Lei Garcia  Next Visit: None Scheduled  None         None Found                                                            Last  Test          Frequency    Reason                     Performed    Due Date  --------------------------------------------------------------------------------    Lipid Panel.  12 months..  atorvastatin.............  Not Found    Overdue    Powered by [employer]. Reference number: [id number].   1/19/2022 11:46:18 AM CST

## 2022-01-19 NOTE — TELEPHONE ENCOUNTER
[facility] Note  Quick DC. Inappropriate Request   Refill request requires further review by MD: NO   Medication Therapy Plan: Pharmacy is requesting new script(s) for the following medications without required information, (sig/ frequency/qty/etc)     [facility] action(s):  Quick Discontinue      Duplicate Pended Encounter(s)/ Last Prescribed Details:    Pharmacies have been requesting medications for patients without required information, (sig, frequency, qty, etc.). In addition, requests are sent for medication(s) pt. are currently not taking, and medications patients have never taken.    We have spoken to the pharmacies about these request types and advised their teams previously that we are unable to assess these New Script requests and require all details for these requests. This is a known issue and has been reported.        Medication related problems are not assessed for [facility].   Medication Reconciliation Completed? NO Were there pending details that required adjustment? NO     Automatic Epic Generated Protocol Data Below:   Requested Prescriptions   Pending Prescriptions Disp Refills    semaglutide (OZEMPIC) 0.25 mg or 0.5 mg(2 mg/1.5 mL) pen injector [Pharmacy Med Name: OZEMPIC 0.25MG OR 0.5MG INJ PEN]  0              Appointments      Date Provider   Last Visit   8/3/2021 Lei Garcia MD   Next Visit   Visit date not found Lei Garcia MD        Note composed:2:57 PM 01/19/2022

## 2022-01-20 ENCOUNTER — LAB VISIT (OUTPATIENT)
Dept: LAB | Facility: OTHER | Age: 72
End: 2022-01-20
Attending: STUDENT IN AN ORGANIZED HEALTH CARE EDUCATION/TRAINING PROGRAM
Payer: MEDICARE

## 2022-01-20 ENCOUNTER — INFUSION (OUTPATIENT)
Dept: INFUSION THERAPY | Facility: OTHER | Age: 72
End: 2022-01-20
Attending: STUDENT IN AN ORGANIZED HEALTH CARE EDUCATION/TRAINING PROGRAM
Payer: MEDICARE

## 2022-01-20 ENCOUNTER — OFFICE VISIT (OUTPATIENT)
Dept: HEMATOLOGY/ONCOLOGY | Facility: CLINIC | Age: 72
End: 2022-01-20
Payer: MEDICARE

## 2022-01-20 VITALS
DIASTOLIC BLOOD PRESSURE: 59 MMHG | BODY MASS INDEX: 47.09 KG/M2 | HEART RATE: 68 BPM | RESPIRATION RATE: 16 BRPM | HEIGHT: 66 IN | SYSTOLIC BLOOD PRESSURE: 122 MMHG | OXYGEN SATURATION: 100 % | WEIGHT: 293 LBS

## 2022-01-20 DIAGNOSIS — C16.9 GASTRIC ADENOCARCINOMA: Primary | ICD-10-CM

## 2022-01-20 DIAGNOSIS — C16.9 GASTRIC ADENOCARCINOMA: ICD-10-CM

## 2022-01-20 DIAGNOSIS — M25.562 CHRONIC PAIN OF LEFT KNEE: ICD-10-CM

## 2022-01-20 DIAGNOSIS — I10 ESSENTIAL HYPERTENSION: ICD-10-CM

## 2022-01-20 DIAGNOSIS — E66.01 MORBID OBESITY: ICD-10-CM

## 2022-01-20 DIAGNOSIS — E78.5 HYPERLIPIDEMIA, UNSPECIFIED HYPERLIPIDEMIA TYPE: ICD-10-CM

## 2022-01-20 DIAGNOSIS — D84.81 IMMUNODEFICIENCY DUE TO CONDITIONS CLASSIFIED ELSEWHERE: ICD-10-CM

## 2022-01-20 DIAGNOSIS — R60.0 BILATERAL LEG EDEMA: ICD-10-CM

## 2022-01-20 DIAGNOSIS — I82.411 DVT OF DEEP FEMORAL VEIN, RIGHT: ICD-10-CM

## 2022-01-20 DIAGNOSIS — I50.32 CHRONIC DIASTOLIC CONGESTIVE HEART FAILURE: ICD-10-CM

## 2022-01-20 DIAGNOSIS — G89.29 CHRONIC PAIN OF LEFT KNEE: ICD-10-CM

## 2022-01-20 LAB
ALBUMIN SERPL BCP-MCNC: 2.9 G/DL (ref 3.5–5.2)
ALP SERPL-CCNC: 102 U/L (ref 55–135)
ALT SERPL W/O P-5'-P-CCNC: 16 U/L (ref 10–44)
ANION GAP SERPL CALC-SCNC: 9 MMOL/L (ref 8–16)
AST SERPL-CCNC: 22 U/L (ref 10–40)
BILIRUB SERPL-MCNC: 0.6 MG/DL (ref 0.1–1)
BUN SERPL-MCNC: 9 MG/DL (ref 8–23)
CALCIUM SERPL-MCNC: 9.2 MG/DL (ref 8.7–10.5)
CHLORIDE SERPL-SCNC: 110 MMOL/L (ref 95–110)
CO2 SERPL-SCNC: 25 MMOL/L (ref 23–29)
CREAT SERPL-MCNC: 0.7 MG/DL (ref 0.5–1.4)
ERYTHROCYTE [DISTWIDTH] IN BLOOD BY AUTOMATED COUNT: 15.9 % (ref 11.5–14.5)
EST. GFR  (AFRICAN AMERICAN): >60 ML/MIN/1.73 M^2
EST. GFR  (NON AFRICAN AMERICAN): >60 ML/MIN/1.73 M^2
GLUCOSE SERPL-MCNC: 99 MG/DL (ref 70–110)
HCT VFR BLD AUTO: 36.9 % (ref 37–48.5)
HGB BLD-MCNC: 11.3 G/DL (ref 12–16)
IMM GRANULOCYTES # BLD AUTO: 0.04 K/UL (ref 0–0.04)
MCH RBC QN AUTO: 31.5 PG (ref 27–31)
MCHC RBC AUTO-ENTMCNC: 30.6 G/DL (ref 32–36)
MCV RBC AUTO: 103 FL (ref 82–98)
NEUTROPHILS # BLD AUTO: 6.7 K/UL (ref 1.8–7.7)
PLATELET # BLD AUTO: 285 K/UL (ref 150–450)
PMV BLD AUTO: 10.5 FL (ref 9.2–12.9)
POTASSIUM SERPL-SCNC: 4.5 MMOL/L (ref 3.5–5.1)
PROT SERPL-MCNC: 6 G/DL (ref 6–8.4)
RBC # BLD AUTO: 3.59 M/UL (ref 4–5.4)
SODIUM SERPL-SCNC: 144 MMOL/L (ref 136–145)
WBC # BLD AUTO: 10.03 K/UL (ref 3.9–12.7)

## 2022-01-20 PROCEDURE — 1126F AMNT PAIN NOTED NONE PRSNT: CPT | Mod: HCNC,CPTII,S$GLB, | Performed by: STUDENT IN AN ORGANIZED HEALTH CARE EDUCATION/TRAINING PROGRAM

## 2022-01-20 PROCEDURE — 1160F PR REVIEW ALL MEDS BY PRESCRIBER/CLIN PHARMACIST DOCUMENTED: ICD-10-PCS | Mod: HCNC,CPTII,S$GLB, | Performed by: STUDENT IN AN ORGANIZED HEALTH CARE EDUCATION/TRAINING PROGRAM

## 2022-01-20 PROCEDURE — 99999 PR PBB SHADOW E&M-EST. PATIENT-LVL IV: ICD-10-PCS | Mod: PBBFAC,HCNC,, | Performed by: STUDENT IN AN ORGANIZED HEALTH CARE EDUCATION/TRAINING PROGRAM

## 2022-01-20 PROCEDURE — 99999 PR PBB SHADOW E&M-EST. PATIENT-LVL IV: CPT | Mod: PBBFAC,HCNC,, | Performed by: STUDENT IN AN ORGANIZED HEALTH CARE EDUCATION/TRAINING PROGRAM

## 2022-01-20 PROCEDURE — 3074F SYST BP LT 130 MM HG: CPT | Mod: HCNC,CPTII,S$GLB, | Performed by: STUDENT IN AN ORGANIZED HEALTH CARE EDUCATION/TRAINING PROGRAM

## 2022-01-20 PROCEDURE — 80053 COMPREHEN METABOLIC PANEL: CPT | Mod: HCNC | Performed by: STUDENT IN AN ORGANIZED HEALTH CARE EDUCATION/TRAINING PROGRAM

## 2022-01-20 PROCEDURE — 36415 COLL VENOUS BLD VENIPUNCTURE: CPT | Mod: HCNC | Performed by: STUDENT IN AN ORGANIZED HEALTH CARE EDUCATION/TRAINING PROGRAM

## 2022-01-20 PROCEDURE — 3288F FALL RISK ASSESSMENT DOCD: CPT | Mod: HCNC,CPTII,S$GLB, | Performed by: STUDENT IN AN ORGANIZED HEALTH CARE EDUCATION/TRAINING PROGRAM

## 2022-01-20 PROCEDURE — 85027 COMPLETE CBC AUTOMATED: CPT | Mod: HCNC | Performed by: STUDENT IN AN ORGANIZED HEALTH CARE EDUCATION/TRAINING PROGRAM

## 2022-01-20 PROCEDURE — 3078F DIAST BP <80 MM HG: CPT | Mod: HCNC,CPTII,S$GLB, | Performed by: STUDENT IN AN ORGANIZED HEALTH CARE EDUCATION/TRAINING PROGRAM

## 2022-01-20 PROCEDURE — 3288F PR FALLS RISK ASSESSMENT DOCUMENTED: ICD-10-PCS | Mod: HCNC,CPTII,S$GLB, | Performed by: STUDENT IN AN ORGANIZED HEALTH CARE EDUCATION/TRAINING PROGRAM

## 2022-01-20 PROCEDURE — 25000003 PHARM REV CODE 250: Mod: HCNC | Performed by: STUDENT IN AN ORGANIZED HEALTH CARE EDUCATION/TRAINING PROGRAM

## 2022-01-20 PROCEDURE — 3078F PR MOST RECENT DIASTOLIC BLOOD PRESSURE < 80 MM HG: ICD-10-PCS | Mod: HCNC,CPTII,S$GLB, | Performed by: STUDENT IN AN ORGANIZED HEALTH CARE EDUCATION/TRAINING PROGRAM

## 2022-01-20 PROCEDURE — 1101F PR PT FALLS ASSESS DOC 0-1 FALLS W/OUT INJ PAST YR: ICD-10-PCS | Mod: HCNC,CPTII,S$GLB, | Performed by: STUDENT IN AN ORGANIZED HEALTH CARE EDUCATION/TRAINING PROGRAM

## 2022-01-20 PROCEDURE — A4216 STERILE WATER/SALINE, 10 ML: HCPCS | Mod: HCNC | Performed by: STUDENT IN AN ORGANIZED HEALTH CARE EDUCATION/TRAINING PROGRAM

## 2022-01-20 PROCEDURE — 99215 PR OFFICE/OUTPT VISIT, EST, LEVL V, 40-54 MIN: ICD-10-PCS | Mod: HCNC,S$GLB,, | Performed by: STUDENT IN AN ORGANIZED HEALTH CARE EDUCATION/TRAINING PROGRAM

## 2022-01-20 PROCEDURE — 1126F PR PAIN SEVERITY QUANTIFIED, NO PAIN PRESENT: ICD-10-PCS | Mod: HCNC,CPTII,S$GLB, | Performed by: STUDENT IN AN ORGANIZED HEALTH CARE EDUCATION/TRAINING PROGRAM

## 2022-01-20 PROCEDURE — 96523 IRRIG DRUG DELIVERY DEVICE: CPT | Mod: HCNC

## 2022-01-20 PROCEDURE — 99499 RISK ADDL DX/OHS AUDIT: ICD-10-PCS | Mod: S$GLB,,, | Performed by: STUDENT IN AN ORGANIZED HEALTH CARE EDUCATION/TRAINING PROGRAM

## 2022-01-20 PROCEDURE — 3008F PR BODY MASS INDEX (BMI) DOCUMENTED: ICD-10-PCS | Mod: HCNC,CPTII,S$GLB, | Performed by: STUDENT IN AN ORGANIZED HEALTH CARE EDUCATION/TRAINING PROGRAM

## 2022-01-20 PROCEDURE — 1160F RVW MEDS BY RX/DR IN RCRD: CPT | Mod: HCNC,CPTII,S$GLB, | Performed by: STUDENT IN AN ORGANIZED HEALTH CARE EDUCATION/TRAINING PROGRAM

## 2022-01-20 PROCEDURE — 3074F PR MOST RECENT SYSTOLIC BLOOD PRESSURE < 130 MM HG: ICD-10-PCS | Mod: HCNC,CPTII,S$GLB, | Performed by: STUDENT IN AN ORGANIZED HEALTH CARE EDUCATION/TRAINING PROGRAM

## 2022-01-20 PROCEDURE — 3008F BODY MASS INDEX DOCD: CPT | Mod: HCNC,CPTII,S$GLB, | Performed by: STUDENT IN AN ORGANIZED HEALTH CARE EDUCATION/TRAINING PROGRAM

## 2022-01-20 PROCEDURE — 99215 OFFICE O/P EST HI 40 MIN: CPT | Mod: HCNC,S$GLB,, | Performed by: STUDENT IN AN ORGANIZED HEALTH CARE EDUCATION/TRAINING PROGRAM

## 2022-01-20 PROCEDURE — 1159F PR MEDICATION LIST DOCUMENTED IN MEDICAL RECORD: ICD-10-PCS | Mod: HCNC,CPTII,S$GLB, | Performed by: STUDENT IN AN ORGANIZED HEALTH CARE EDUCATION/TRAINING PROGRAM

## 2022-01-20 PROCEDURE — 1159F MED LIST DOCD IN RCRD: CPT | Mod: HCNC,CPTII,S$GLB, | Performed by: STUDENT IN AN ORGANIZED HEALTH CARE EDUCATION/TRAINING PROGRAM

## 2022-01-20 PROCEDURE — 1101F PT FALLS ASSESS-DOCD LE1/YR: CPT | Mod: HCNC,CPTII,S$GLB, | Performed by: STUDENT IN AN ORGANIZED HEALTH CARE EDUCATION/TRAINING PROGRAM

## 2022-01-20 PROCEDURE — 63600175 PHARM REV CODE 636 W HCPCS: Mod: HCNC | Performed by: STUDENT IN AN ORGANIZED HEALTH CARE EDUCATION/TRAINING PROGRAM

## 2022-01-20 PROCEDURE — 99499 UNLISTED E&M SERVICE: CPT | Mod: S$GLB,,, | Performed by: STUDENT IN AN ORGANIZED HEALTH CARE EDUCATION/TRAINING PROGRAM

## 2022-01-20 RX ORDER — SODIUM CHLORIDE 0.9 % (FLUSH) 0.9 %
10 SYRINGE (ML) INJECTION
Status: DISCONTINUED | OUTPATIENT
Start: 2022-01-20 | End: 2022-01-20 | Stop reason: HOSPADM

## 2022-01-20 RX ORDER — HEPARIN 100 UNIT/ML
500 SYRINGE INTRAVENOUS
Status: CANCELLED | OUTPATIENT
Start: 2022-01-20

## 2022-01-20 RX ORDER — SODIUM CHLORIDE 0.9 % (FLUSH) 0.9 %
10 SYRINGE (ML) INJECTION
Status: CANCELLED | OUTPATIENT
Start: 2022-01-20

## 2022-01-20 RX ORDER — HEPARIN 100 UNIT/ML
500 SYRINGE INTRAVENOUS
Status: DISCONTINUED | OUTPATIENT
Start: 2022-01-20 | End: 2022-01-20 | Stop reason: HOSPADM

## 2022-01-20 RX ADMIN — HEPARIN 500 UNITS: 100 SYRINGE at 11:01

## 2022-01-20 RX ADMIN — SODIUM CHLORIDE, PRESERVATIVE FREE 10 ML: 5 INJECTION INTRAVENOUS at 11:01

## 2022-01-20 NOTE — PLAN OF CARE
Port to chest accessed. Good blood return and flushes easily. Port heparinized per protocol then deaccessed. Bandaid in place.

## 2022-01-20 NOTE — PROGRESS NOTES
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 to Home Health    Comprehensive Metabolic Panel    Magnesium    Iron and TIBC    Ferritin    Chronic diastolic congestive heart failure (Chronic)    Essential hypertension    Chronic knee pain      Other Visit Diagnoses     DVT of deep femoral vein, right        Immunodeficiency due to conditions classified elsewhere        Bilateral leg edema        Morbid obesity        Relevant Orders    Ambulatory referral/consult to Home Health    Hyperlipidemia, unspecified hyperlipidemia type            Route Chart for Scheduling    Med Onc Chart Routing  Follow up with physician 2 weeks. Referral to home health, chemo drugs may change, this is tentative   Follow up with [facility]    Labs CBC, CMP, ferritin and iron and TIBC   Lab interval:     Imaging    Pharmacy appointment No pharmacy appointment needed      Other referrals Additional referrals needed         Treatment Plan Information   OP GASTRIC FLOT - FLUOROURACIL LEUCOVORIN OXALIPLATIN DOCETAXEL Q2W   Cathy Pelaez MD   Upcoming Treatment Dates - OP GASTRIC FLOT - FLUOROURACIL LEUCOVORIN OXALIPLATIN DOCETAXEL Q2W    2/3/2022       Chemotherapy       DOCEtaxeL (TAXOTERE) in sodium chloride 0.9% 250 mL chemo infusion       oxaliplatin (ELOXATIN) in dextrose 5 % 500 mL chemo infusion       leucovorin calcium in dextrose 5 % 250 mL infusion       fluorouracil (ADRUCIL) in sodium chloride 0.9% 100 mL chemo  infusion  2/17/2022       Chemotherapy       DOCEtaxeL (TAXOTERE) in sodium chloride 0.9% 250 mL chemo infusion       oxaliplatin (ELOXATIN) in dextrose 5 % 500 mL chemo infusion       leucovorin calcium in dextrose 5 % 250 mL infusion       fluorouracil (ADRUCIL) in sodium chloride 0.9% 100 mL chemo infusion  3/3/2022       Chemotherapy       DOCEtaxeL (TAXOTERE) in sodium chloride 0.9% 250 mL chemo infusion       oxaliplatin (ELOXATIN) in dextrose 5 % 500 mL chemo infusion       leucovorin calcium in dextrose 5 % 250 mL infusion       fluorouracil (ADRUCIL) in sodium chloride 0.9% 100 mL chemo infusion  3/17/2022       Chemotherapy       DOCEtaxeL (TAXOTERE) in sodium chloride 0.9% 250 mL chemo infusion       oxaliplatin (ELOXATIN) in dextrose 5 % 500 mL chemo infusion       leucovorin calcium in dextrose 5 % 250 mL infusion       fluorouracil (ADRUCIL) in sodium chloride 0.9% 100 mL chemo infusion    Therapy Plan Information  INJECTAFER (FERRIC CARBOXYMALTOSE)  Medications  ferric carboxymaltose (INJECTAFER) 750 mg in sodium chloride 0.9% 265 mL infusion  750 mg, Intravenous, 1 time a week  IV Fluids  0.9%  NaCl infusion  Intravenous, 1 time a week  Flushes  sodium chloride 0.9% flush 10 mL  10 mL, Intravenous, 1 time a week  heparin, porcine (PF) 100 unit/mL injection flush 500 Units  500 Units, Intravenous, 1 time a week  sodium chloride 0.9% 100 mL flush bag  Intravenous, 1 time a week  PRN Medications  EPINEPHrine (EPIPEN) 0.3 mg/0.3 mL pen injection 0.3 mg  0.3 mg, Intramuscular, PRN  diphenhydrAMINE injection 50 mg  50 mg, Intravenous, PRN  methylPREDNISolone sodium succinate injection 125 mg  125 mg, Intravenous, PRN  sodium chloride 0.9% bolus 1,000 mL  1,000 mL, Intravenous, PRN    PORT FLUSH  Flushes  heparin, porcine (PF) 100 unit/mL injection flush 500 Units  500 Units, Intravenous, Every visit  sodium chloride 0.9% flush 10 mL  10 mL, Intravenous, Every visit          Electronically signed by Cathy  Gupta [facility]      Future Appointments   Date Time Provider Department Center   1/25/2022  9:20 AM Charles Montero MD [facility] CARDIO [facility]   3/21/2022 10:00 AM Savanna Hyatt NP [facility] PAINT [facility]           This note was created with voice recognition software.  Grammatical, syntax and spelling errors may be inevitable.

## 2022-01-20 NOTE — Clinical Note
Referral to radiation onc made Plan for 5 days of 5fu , every week, needs coordination dr bermudez rad onc staff,.  Thanks.

## 2022-01-21 ENCOUNTER — TELEPHONE (OUTPATIENT)
Dept: HEMATOLOGY/ONCOLOGY | Facility: CLINIC | Age: 72
End: 2022-01-21
Payer: MEDICARE

## 2022-01-21 DIAGNOSIS — M25.562 CHRONIC PAIN OF LEFT KNEE: ICD-10-CM

## 2022-01-21 DIAGNOSIS — G89.29 OTHER CHRONIC PAIN: ICD-10-CM

## 2022-01-21 DIAGNOSIS — G89.4 CHRONIC PAIN DISORDER: ICD-10-CM

## 2022-01-21 DIAGNOSIS — G89.29 CHRONIC PAIN OF LEFT KNEE: ICD-10-CM

## 2022-01-21 LAB
FINAL PATHOLOGIC DIAGNOSIS: NORMAL
GROSS: NORMAL
Lab: NORMAL

## 2022-01-21 RX ORDER — OXYCODONE AND ACETAMINOPHEN 5; 325 MG/1; MG/1
1 TABLET ORAL EVERY 8 HOURS PRN
Qty: 90 TABLET | Refills: 0 | Status: SHIPPED | OUTPATIENT
Start: 2022-01-24 | End: 2022-02-23 | Stop reason: SDUPTHER

## 2022-01-21 NOTE — TELEPHONE ENCOUNTER
Spoke to patient. Informed patient that her RX refill was approved and sent to pharmacy. Patient verbally understood.

## 2022-01-21 NOTE — TELEPHONE ENCOUNTER
Patient is requesting RX refill for Percocet 5/325 mg    LOV: 12/28/2021  Last filled per : 12/24/2022  Pain contract on file: 08/11/2020  UDS: 10/04/2019    CODEINE  Not Detected    MORPHINE  Not Detected    6-ACETYLMORPHINE  Not Detected    OXYCODONE  Present    NOROYXCODONE  Present    OXYMORPHONE  Not Detected    NOROXYMORPHONE  Not Detected    HYDROCODONE  Not Detected    NORHYDROCODONE  Not Detected    HYDROMORPHONE  Not Detected    BUPRENORPHINE  Not Detected    NORUBPRENORPHINE  Not Detected    FENTANYL  Not Detected    NORFENTANYL  Not Detected    MEPERIDINE METABOLITE  Not Detected    TAPENTADOL  Not Detected    TAPENTADOL-O-SULF  Not Detected    METHADONE  Not Detected    PROPOXYPHENE  Not Detected    TRAMADOL  Not Detected    AMPHETAMINE  Not Detected    METHAMPHETAMINE  Not Detected    MDMA- ECSTASY  Not Detected    MDA  Not Detected    MDEA- Genoveva  Not Detected    METHYLPHENIDATE  Not Detected    PHENTERMINE  Not Detected    BENZOYLECGONINE  Not Detected    ALPRAZOLAM  Not Detected    ALPHA-OH-ALPRAZOLAM  Not Detected    CLONAZEPAM  Not Detected    7-AMINOCLONAZEPAM  Not Detected    DIAZEPAM  Not Detected    NORDIAZEPAM  Not Detected    OXAZEPAM  Not Detected    TEMAZEPAM  Not Detected    Lorazepam  Not Detected    MIDAZOLAM  Not Detected    ZOLPIDEM  Not Detected    BARBITURATES  Not Detected    Creatinine, Urine 20.0 - 400.0 mg/dL 102.0    ETHYL GLUCURONIDE  Not Detected    MARIJUANA METABOLITE  Not Detected    PCP  Not Detected    CARISOPRODOL  Not Detected    Comment: The carisoprodol immunoassay has cross-reactivity to   carisoprodol and meprobamate.

## 2022-01-21 NOTE — TELEPHONE ENCOUNTER
----- Message from Nancy Lui MA sent at 1/21/2022  8:40 AM CST -----  Regarding: refill request  Type:  RX Refill Request    Who Called: DARCY RENAE [[id number]]    Refill or New Rx: refill     RX Name and Strength:oxyCODONE-acetaminophen (PERCOCET) 5-325 mg per tablet    How is the patient currently taking it? (ex. 1XDay):Take 1 tablet by mouth every 8 (eight) hours as needed for Pain    Is this a 30 day or 90 day RX:    Preferred Pharmacy with phone number:[facility] [address] [place] [address] [address] [place] [address]    Local or Mail Order: local     Ordering Provider:godwin thorne     Would the patient rather a call back or a response via [facility]? Call     Best Call Back Number:[phone]    Additional Information:

## 2022-01-24 ENCOUNTER — TELEPHONE (OUTPATIENT)
Dept: ENDOSCOPY | Facility: HOSPITAL | Age: 72
End: 2022-01-24
Payer: MEDICARE

## 2022-01-24 PROBLEM — Z86.718 HISTORY OF DVT (DEEP VEIN THROMBOSIS): Chronic | Status: ACTIVE | Noted: 2021-10-15

## 2022-01-24 PROBLEM — R06.09 DYSPNEA ON EXERTION: Status: RESOLVED | Noted: 2019-04-29 | Resolved: 2022-01-24

## 2022-01-24 PROCEDURE — G0180 PR HOME HEALTH MD CERTIFICATION: ICD-10-PCS | Mod: ,,, | Performed by: STUDENT IN AN ORGANIZED HEALTH CARE EDUCATION/TRAINING PROGRAM

## 2022-01-24 PROCEDURE — G0180 MD CERTIFICATION HHA PATIENT: HCPCS | Mod: ,,, | Performed by: STUDENT IN AN ORGANIZED HEALTH CARE EDUCATION/TRAINING PROGRAM

## 2022-01-24 NOTE — TELEPHONE ENCOUNTER
----- Message from Kevin Carballo MD sent at 1/21/2022 11:41 AM CST -----  Please let the patient know the biopsies of the ESD scar showed inflammation. No evidence of cancer.

## 2022-01-25 ENCOUNTER — OFFICE VISIT (OUTPATIENT)
Dept: CARDIOLOGY | Facility: CLINIC | Age: 72
End: 2022-01-25
Payer: MEDICARE

## 2022-01-25 VITALS
HEIGHT: 66 IN | BODY MASS INDEX: 47.09 KG/M2 | WEIGHT: 293 LBS | DIASTOLIC BLOOD PRESSURE: 67 MMHG | HEART RATE: 72 BPM | SYSTOLIC BLOOD PRESSURE: 141 MMHG | OXYGEN SATURATION: 97 %

## 2022-01-25 DIAGNOSIS — E66.01 CLASS 3 SEVERE OBESITY DUE TO EXCESS CALORIES WITH SERIOUS COMORBIDITY AND BODY MASS INDEX (BMI) OF 50.0 TO 59.9 IN ADULT: ICD-10-CM

## 2022-01-25 DIAGNOSIS — I50.32 CHRONIC DIASTOLIC CONGESTIVE HEART FAILURE: Primary | Chronic | ICD-10-CM

## 2022-01-25 DIAGNOSIS — R06.02 SHORTNESS OF BREATH: ICD-10-CM

## 2022-01-25 DIAGNOSIS — C16.9 GASTRIC ADENOCARCINOMA: ICD-10-CM

## 2022-01-25 DIAGNOSIS — D50.0 IRON DEFICIENCY ANEMIA SECONDARY TO BLOOD LOSS (CHRONIC): ICD-10-CM

## 2022-01-25 DIAGNOSIS — R60.0 BILATERAL LOWER EXTREMITY EDEMA: ICD-10-CM

## 2022-01-25 DIAGNOSIS — E78.00 PURE HYPERCHOLESTEROLEMIA: Chronic | ICD-10-CM

## 2022-01-25 DIAGNOSIS — Z86.718 HISTORY OF DVT (DEEP VEIN THROMBOSIS): Chronic | ICD-10-CM

## 2022-01-25 DIAGNOSIS — E03.9 HYPOTHYROIDISM, UNSPECIFIED TYPE: ICD-10-CM

## 2022-01-25 DIAGNOSIS — Z72.0 TOBACCO ABUSE: Chronic | ICD-10-CM

## 2022-01-25 DIAGNOSIS — I10 ESSENTIAL HYPERTENSION: ICD-10-CM

## 2022-01-25 DIAGNOSIS — G47.33 OBSTRUCTIVE SLEEP APNEA: ICD-10-CM

## 2022-01-25 PROCEDURE — 99999 PR PBB SHADOW E&M-EST. PATIENT-LVL V: ICD-10-PCS | Mod: PBBFAC,HCNC,GC, | Performed by: INTERNAL MEDICINE

## 2022-01-25 PROCEDURE — 99214 PR OFFICE/OUTPT VISIT, EST, LEVL IV, 30-39 MIN: ICD-10-PCS | Mod: HCNC,GC,S$GLB, | Performed by: INTERNAL MEDICINE

## 2022-01-25 PROCEDURE — 99215 OFFICE O/P EST HI 40 MIN: CPT | Mod: PBBFAC | Performed by: INTERNAL MEDICINE

## 2022-01-25 PROCEDURE — 99214 OFFICE O/P EST MOD 30 MIN: CPT | Mod: HCNC,GC,S$GLB, | Performed by: INTERNAL MEDICINE

## 2022-01-25 PROCEDURE — 99999 PR PBB SHADOW E&M-EST. PATIENT-LVL V: CPT | Mod: PBBFAC,HCNC,GC, | Performed by: INTERNAL MEDICINE

## 2022-01-25 NOTE — PROGRESS NOTES
Patient MRN: [id number]  Patient : 1950  PCP: Lei Garcia MD  CC:   Chief Complaint   Patient presents with    Hypertension     1 yr f/u     Shortness of Breath    Leg Swelling    Palpitations        History of Present Illness:   DARCY Coats is a 71 y.o. y.o. female who presents for 1 year follow up.      This is an established patient for me presenting with a new problem of LE edema. Her other chronic medical conditions include LAURA, HTN, HLD, Obesity, smoker, Mili CHF.     Previously followed at [facility].  An approximate 2017 patient was evaluated with a stress echo that was reportedly negative.  Patient was evaluated preoperatively for bariatric surgery.  She was noted to have a gastric ulcer at that time therefore procedure was canceled.     She has now established care at [facility] .  She desires to have a left knee replacement.  She underwent preoperative risk assessment with a dobutamine stress echo that was reported as positive with wall motion abnormalities in the basal inferolateral and basal anterolateral walls.  EF preserved 60% with 1/3 diastolic dysfunction. Referred to angiogram which was negative for CAD.     She continues to smoke. She is followed by smoking cessation at [facility].     She her hyperlipidemia is treated with Lipitor 20 and LDL is controlled.  She has not been on aspirin.    She ambulates with a walker/cane at baseline.  She only walks around the house to do her activities of daily living.  She is not currently exercising.  She has not been able to walk without assistance for years.  She does note new onset LE edema and dyspnea with minimal activity.     This is the extent of the patient's complaints at this time. Patient queried and denies any further complaint.     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    GERD (gastroesophageal reflux disease)     Glaucoma suspect     History of gastric ulcer     Hyperlipidemia     Hypertension     Hypothyroidism     Morbid obesity     Neuromuscular disorder     Sleep apnea     Thyroid disease        Past Surgical History:     Past Surgical History:   Procedure Laterality Date    APPENDECTOMY      CARDIAC SURGERY      CATARACT EXTRACTION W/  INTRAOCULAR LENS IMPLANT Bilateral     MFIOL OU    CORONARY ANGIOGRAPHY Bilateral 2/24/2021    Procedure: ANGIOGRAM, CORONARY ARTERY;  Surgeon: Cresencio Ridley MD;  Location: [facility] CATH LAB;  Service: Cardiology;  Laterality: Bilateral;  Covid test needed - ok per Danay SSCU. Pt. w/o transportation or family    ENDOSCOPIC ULTRASOUND OF UPPER GASTROINTESTINAL TRACT N/A 3/17/2021    Procedure: ULTRASOUND, UPPER GI TRACT, ENDOSCOPIC;  Surgeon: Gerald Anaya MD;  [facility]  Service: Endoscopy;  Laterality: N/A;  Pt unable to get a ride for swab. Will do rapid test at 8:30 - ttr    ENDOSCOPIC ULTRASOUND OF UPPER GASTROINTESTINAL TRACT N/A 1/13/2022    Procedure: ULTRASOUND, UPPER GI TRACT, ENDOSCOPIC;  Surgeon: Kevin Carballo MD;  [facility] ([facility]  Service: Endoscopy;  Laterality: N/A;  blood thinner approval received, see telephone encounter 1/7/22-BB  rapid  instructions given verbally and sent to address on file in pt's chart-BB    ESOPHAGOGASTRODUODENOSCOPY N/A 2/5/2021    Procedure: EGD (ESOPHAGOGASTRODUODENOSCOPY);  Surgeon: Matthew Hernadez MD;  Location: [facility] ([facility]);  Service: Endoscopy;  Laterality: N/A;  BMI-58    Wt: 361#     COVID test at [facility] on 2/2-GT    ESOPHAGOGASTRODUODENOSCOPY N/A 3/17/2021    Procedure: EGD (ESOPHAGOGASTRODUODENOSCOPY);  Surgeon: Gerald Anaya MD;  Location: [facility] ([facility]);  Service: Endoscopy;  Laterality: N/A;     ESOPHAGOGASTRODUODENOSCOPY N/A 5/25/2021    Procedure: EGD (ESOPHAGOGASTRODUODENOSCOPY);  Surgeon: Kevin Carballo MD;  Location: [facility] SAM (2ND FLR);  Service: Endoscopy;  Laterality: N/A;  ESD 2 hours  Full COVID vaccination on file. ttr    ESOPHAGOGASTRODUODENOSCOPY N/A 1/13/2022    Procedure: EGD (ESOPHAGOGASTRODUODENOSCOPY);  Surgeon: Kevin Carballo MD;  Location: [facility] SAM (2ND FLR);  Service: Endoscopy;  Laterality: N/A;  blood thinner approval, see telephone encounter 1/7/22-BB  rapid  instructions given verbally and sent to address on file in pt's chart-BB    EYE SURGERY      INJECTION OF ANESTHETIC AGENT AROUND NERVE Left 7/10/2018    Procedure: BLOCK, NERVE;  Surgeon: Samantha Vidal MD;  Location: [facility] PAIN MGT;  Service: Pain Management;  Laterality: Left;  Genicular     INJECTION OF ANESTHETIC AGENT AROUND NERVE Left 7/19/2019    Procedure: Block, Nerve NERVE BLOCK GENICULAR WITH PHENOL 6%;  Surgeon: Samantha Vidal MD;  Location: [facility] PAIN MGT;  Service: Pain Management;  Laterality: Left;  NEEDS CONSENT    INSERTION OF TUNNELED CENTRAL VENOUS CATHETER (CVC) WITH SUBCUTANEOUS PORT N/A 7/9/2021    Procedure: INSERTION, PORT-A-CATH;  Surgeon: Mario Paz MD;  Location: [facility] CATH LAB;  Service: Radiology;  Laterality: N/A;  PORT PLACEMENT    RADIOFREQUENCY ABLATION Left 6/19/2020    Procedure: RADIOFREQUENCY ABLATION LEFT GENICULAR;  Surgeon: Tevin Clark MD;  Location: [facility] PAIN MGT;  Service: Pain Management;  Laterality: Left;  Left Genicular RFA    RADIOFREQUENCY ABLATION Left 1/22/2021    Procedure: RADIOFREQUENCY ABLATION LEFT GENICULAR;  Surgeon: Tevin Clark MD;  Location: [facility] PAIN MGT;  Service: Pain Management;  Laterality: Left;    RADIOFREQUENCY ABLATION Left 7/30/2021    Procedure: RADIOFREQUENCY ABLATION, GENICULAR COOLED NEED CONSENT;  Surgeon: Tevin Clark MD;  Location: [facility] PAIN MGT;  Service: Pain Management;  Laterality: Left;    TONSILLECTOMY          Social History:     Social History     Tobacco Use    Smoking status: Current Some Day Smoker     Packs/day: 0.25     Years: 50.00     Pack years: 12.50     Types: Cigarettes     Last attempt to quit: 2020     Years since quittin.0    Smokeless tobacco: Never Used   Substance Use Topics    Alcohol use: Yes     Comment: meg       Family History:     Family History   Problem Relation Age of Onset    No Known Problems Mother     No Known Problems Father     Colon cancer Neg Hx     Esophageal cancer Neg Hx        Allergies:   Review of patient's allergies indicates:  No Known Allergies    Review of Systems:   Review of Systems   Constitutional: Negative for chills, fever and weight loss.   HENT: Negative for hearing loss and sore throat.    Eyes: Negative for blurred vision.   Respiratory: Positive for shortness of breath. Negative for cough.    Cardiovascular: Positive for leg swelling. Negative for chest pain, palpitations, orthopnea and PND.   Gastrointestinal: Negative for abdominal pain, constipation, diarrhea, heartburn, nausea and vomiting.   Genitourinary: Negative for dysuria.   Musculoskeletal: Negative for neck pain.   Skin: Negative for rash.   Neurological: Negative for dizziness and headaches.   Endo/Heme/Allergies: Does not bruise/bleed easily.   Psychiatric/Behavioral: Negative for substance abuse.       All other systems reviewed and are negative.   Medications:     Current Outpatient Medications on File Prior to Visit   Medication Sig Dispense Refill    acetaminophen (TYLENOL) 500 MG tablet       amLODIPine (NORVASC) 10 MG tablet Take 1 tablet (10 mg total) by mouth once daily. 90 tablet 11    apixaban (ELIQUIS) 5 mg Tab Take 1 tablet (5 mg total) by mouth 2 (two) times daily. 60 tablet 3    atorvastatin (LIPITOR) 20 MG tablet Take 1 tablet (20 mg total) by mouth once daily. 90 tablet 11    B-complex with vitamin C (Z-BEC OR EQUIV) tablet Take 1 tablet by mouth once daily.    "    CALCIUM CITRATE-VITAMIN D2 ORAL Take 1 tablet by mouth once daily.       citalopram (CELEXA) 10 MG tablet Take 1 tablet (10 mg total) by mouth once daily. 90 tablet 3    ferrous gluconate (FERGON) 324 MG tablet Take 1 tablet (324 mg total) by mouth daily with breakfast. 90 tablet 11    gabapentin (NEURONTIN) 300 MG capsule Take 2 capsules (600 mg total) by mouth 3 (three) times daily. 540 capsule 2    LIDOcaine-prilocaine (EMLA) cream Apply topically as needed (prior to port access). 30 g 0    lisinopriL (PRINIVIL,ZESTRIL) 40 MG tablet Take 1 tablet (40 mg total) by mouth once daily. 90 tablet 11    multivitamin with minerals tablet Take 1 tablet by mouth once daily.       oxyCODONE-acetaminophen (PERCOCET) 5-325 mg per tablet Take 1 tablet by mouth every 8 (eight) hours as needed for Pain. 90 tablet 0    semaglutide (OZEMPIC) 1 mg/dose (2 mg/1.5 mL) PnIj Inject 1 mg into the skin every 7 days. 2 pen 3    [DISCONTINUED] sucralfate (CARAFATE) 1 gram tablet 1 tablet dissolved in 10 ml of water orally twice a day. (Patient not taking: Reported on 1/25/2022) 60 tablet 0     Current Facility-Administered Medications on File Prior to Visit   Medication Dose Route Frequency Provider Last Rate Last Admin    0.9%  NaCl infusion   Intravenous Continuous Tevin Clark MD   Stopped at 01/13/22 1055       Physical Exam:   BP (!) 141/67 (BP Location: Left arm, Patient Position: Sitting, BP Method: Medium (Automatic))   Pulse 72   Ht 5' 6" (1.676 m)   Wt (!) 146.6 kg (323 lb 3.1 oz)   SpO2 97%   BMI 52.16 kg/m²   Physical Exam  Vitals reviewed.   HENT:      Head: Normocephalic and atraumatic.   Eyes:      Conjunctiva/sclera: Conjunctivae normal.   Neck:      Vascular: JVD present.   Cardiovascular:      Rate and Rhythm: Normal rate and regular rhythm.      Heart sounds: Normal heart sounds. No murmur heard.  No friction rub. No gallop.    Pulmonary:      Effort: Pulmonary effort is normal. No respiratory " distress.      Breath sounds: Rales present. No wheezing.   Chest:      Chest wall: No tenderness.   Abdominal:      General: Bowel sounds are normal. There is no distension.      Palpations: Abdomen is soft. There is no mass.      Tenderness: There is no abdominal tenderness. There is no guarding or rebound.   Musculoskeletal:         General: Normal range of motion.      Cervical back: Normal range of motion and neck supple.      Right lower leg: Edema present.      Left lower leg: Edema present.   Skin:     General: Skin is warm and dry.   Neurological:      Mental Status: She is alert and oriented to person, place, and time.   Psychiatric:         Mood and Affect: Mood normal.          Labs:     Lab Results   Component Value Date    WBC 10.03 01/20/2022    HGB 11.3 (L) 01/20/2022    HCT 36.9 (L) 01/20/2022     01/20/2022    GRAN 6.7 01/20/2022        Chemistry        Component Value Date/Time     01/20/2022 0932    K 4.5 01/20/2022 0932     01/20/2022 0932    CO2 25 01/20/2022 0932    BUN 9 01/20/2022 0932    CREATININE 0.7 01/20/2022 0932    GLU 99 01/20/2022 0932        Component Value Date/Time    CALCIUM 9.2 01/20/2022 0932    ALKPHOS 102 01/20/2022 0932    AST 22 01/20/2022 0932    ALT 16 01/20/2022 0932    BILITOT 0.6 01/20/2022 0932    ESTGFRAFRICA >60 01/20/2022 0932    EGFRNONAA >60 01/20/2022 0932          Lab Results   Component Value Date    ALT 16 01/20/2022    AST 22 01/20/2022    ALKPHOS 102 01/20/2022    BILITOT 0.6 01/20/2022      Lab Results   Component Value Date    HGBA1C 4.4 06/30/2021     Lab Results   Component Value Date    BNP 56 01/12/2022    BNP 23 10/26/2021     Lab Results   Component Value Date    CHOL 86 (L) 12/03/2020    HDL 39 (L) 12/03/2020    LDLCALC 31.0 (L) 12/03/2020    TRIG 80 12/03/2020     Lab Results   Component Value Date    INR 0.9 07/15/2019    INR 0.9 09/08/2005        I have reviewed all pertinent labs within the past 24 hours.    Cardiovascular  Imaging:   Echo:   No results found for: EF  2D echo with color flow doppler: No results found for this or any previous visit. and Transthoracic echo (TTE) complete (Cupid Only): No results found for this or any previous visit.      Test(s) Reviewed  I have reviewed the following in detail:  [x] Stress test   [] Angiography   [x] Echocardiogram   [x] Labs   [] Other:       Assessment & Plan:     Chronic diastolic congestive heart failure  1/3 diastolic dysfunction on echo.   - repeat ECHO  - Start Lasix 40mg BID  - BP controlled  - Compression stockings  - Salt and fluid restriction  - Daily weights  - Consider Jardiance and/or LE venous US if fluid persists    Gastric adenocarcinoma  - on chemo  - management per hem/onc    Essential hypertension  - controlled  Continue amlodipine, lisinopril    Obstructive sleep apnea  Management per Sleep Medicine  Continue CPAP    Morbid obesity with BMI of 50.0-59.9, adult  - Not a candidate for bariatric surgery due to cancer  - encouraged diet and exercise    Pure hypercholesterolemia  - Controlled  - Continue Lipitor     Tobacco abuse  -continues to smoke  - she has been referred to smoking cessation    -RTC with BMP in 2 weeks      Patient was discussed with  who agrees with the assessment and plan as above.    Charles Montero - Pager# [phone]  1/25/2022  10:02 AM  Cardiovascular Fellow  [facility]

## 2022-01-26 RX ORDER — FUROSEMIDE 40 MG/1
40 TABLET ORAL 2 TIMES DAILY
Qty: 60 TABLET | Refills: 11 | Status: SHIPPED | OUTPATIENT
Start: 2022-01-26 | End: 2022-02-17

## 2022-01-26 NOTE — TELEPHONE ENCOUNTER
----- Message from Ngoc Hamilton MA sent at 1/26/2022  9:25 AM CST -----  Contact: [facility] caregiver  [phone]  Pt was seen yesterday Tues.1.25 by Dr.Justin Montero. He forgot to do a script for Lasix 40 mg,It is in notes.Please send to [facility] [phone]. Please call [phone].

## 2022-01-27 ENCOUNTER — OFFICE VISIT (OUTPATIENT)
Dept: RADIATION ONCOLOGY | Facility: CLINIC | Age: 72
End: 2022-01-27
Payer: MEDICARE

## 2022-01-27 ENCOUNTER — TELEPHONE (OUTPATIENT)
Dept: CARDIOLOGY | Facility: CLINIC | Age: 72
End: 2022-01-27
Payer: MEDICARE

## 2022-01-27 VITALS
DIASTOLIC BLOOD PRESSURE: 57 MMHG | WEIGHT: 293 LBS | SYSTOLIC BLOOD PRESSURE: 123 MMHG | HEART RATE: 80 BPM | BODY MASS INDEX: 47.09 KG/M2 | RESPIRATION RATE: 20 BRPM | OXYGEN SATURATION: 98 % | HEIGHT: 66 IN

## 2022-01-27 DIAGNOSIS — C16.9 GASTRIC ADENOCARCINOMA: ICD-10-CM

## 2022-01-27 PROCEDURE — 1126F PR PAIN SEVERITY QUANTIFIED, NO PAIN PRESENT: ICD-10-PCS | Mod: HCNC,CPTII,S$GLB, | Performed by: STUDENT IN AN ORGANIZED HEALTH CARE EDUCATION/TRAINING PROGRAM

## 2022-01-27 PROCEDURE — 3078F PR MOST RECENT DIASTOLIC BLOOD PRESSURE < 80 MM HG: ICD-10-PCS | Mod: HCNC,CPTII,S$GLB, | Performed by: STUDENT IN AN ORGANIZED HEALTH CARE EDUCATION/TRAINING PROGRAM

## 2022-01-27 PROCEDURE — 1126F AMNT PAIN NOTED NONE PRSNT: CPT | Mod: HCNC,CPTII,S$GLB, | Performed by: STUDENT IN AN ORGANIZED HEALTH CARE EDUCATION/TRAINING PROGRAM

## 2022-01-27 PROCEDURE — 3288F FALL RISK ASSESSMENT DOCD: CPT | Mod: HCNC,CPTII,S$GLB, | Performed by: STUDENT IN AN ORGANIZED HEALTH CARE EDUCATION/TRAINING PROGRAM

## 2022-01-27 PROCEDURE — 1101F PR PT FALLS ASSESS DOC 0-1 FALLS W/OUT INJ PAST YR: ICD-10-PCS | Mod: HCNC,CPTII,S$GLB, | Performed by: STUDENT IN AN ORGANIZED HEALTH CARE EDUCATION/TRAINING PROGRAM

## 2022-01-27 PROCEDURE — 3288F PR FALLS RISK ASSESSMENT DOCUMENTED: ICD-10-PCS | Mod: HCNC,CPTII,S$GLB, | Performed by: STUDENT IN AN ORGANIZED HEALTH CARE EDUCATION/TRAINING PROGRAM

## 2022-01-27 PROCEDURE — 99205 OFFICE O/P NEW HI 60 MIN: CPT | Mod: HCNC,S$GLB,, | Performed by: STUDENT IN AN ORGANIZED HEALTH CARE EDUCATION/TRAINING PROGRAM

## 2022-01-27 PROCEDURE — 3074F PR MOST RECENT SYSTOLIC BLOOD PRESSURE < 130 MM HG: ICD-10-PCS | Mod: HCNC,CPTII,S$GLB, | Performed by: STUDENT IN AN ORGANIZED HEALTH CARE EDUCATION/TRAINING PROGRAM

## 2022-01-27 PROCEDURE — 1159F PR MEDICATION LIST DOCUMENTED IN MEDICAL RECORD: ICD-10-PCS | Mod: HCNC,CPTII,S$GLB, | Performed by: STUDENT IN AN ORGANIZED HEALTH CARE EDUCATION/TRAINING PROGRAM

## 2022-01-27 PROCEDURE — 3008F BODY MASS INDEX DOCD: CPT | Mod: HCNC,CPTII,S$GLB, | Performed by: STUDENT IN AN ORGANIZED HEALTH CARE EDUCATION/TRAINING PROGRAM

## 2022-01-27 PROCEDURE — 3074F SYST BP LT 130 MM HG: CPT | Mod: HCNC,CPTII,S$GLB, | Performed by: STUDENT IN AN ORGANIZED HEALTH CARE EDUCATION/TRAINING PROGRAM

## 2022-01-27 PROCEDURE — 99999 PR PBB SHADOW E&M-EST. PATIENT-LVL IV: ICD-10-PCS | Mod: PBBFAC,HCNC,, | Performed by: STUDENT IN AN ORGANIZED HEALTH CARE EDUCATION/TRAINING PROGRAM

## 2022-01-27 PROCEDURE — 99205 PR OFFICE/OUTPT VISIT, NEW, LEVL V, 60-74 MIN: ICD-10-PCS | Mod: HCNC,S$GLB,, | Performed by: STUDENT IN AN ORGANIZED HEALTH CARE EDUCATION/TRAINING PROGRAM

## 2022-01-27 PROCEDURE — 1160F RVW MEDS BY RX/DR IN RCRD: CPT | Mod: HCNC,CPTII,S$GLB, | Performed by: STUDENT IN AN ORGANIZED HEALTH CARE EDUCATION/TRAINING PROGRAM

## 2022-01-27 PROCEDURE — 99214 OFFICE O/P EST MOD 30 MIN: CPT | Mod: PBBFAC | Performed by: STUDENT IN AN ORGANIZED HEALTH CARE EDUCATION/TRAINING PROGRAM

## 2022-01-27 PROCEDURE — 1101F PT FALLS ASSESS-DOCD LE1/YR: CPT | Mod: HCNC,CPTII,S$GLB, | Performed by: STUDENT IN AN ORGANIZED HEALTH CARE EDUCATION/TRAINING PROGRAM

## 2022-01-27 PROCEDURE — 1159F MED LIST DOCD IN RCRD: CPT | Mod: HCNC,CPTII,S$GLB, | Performed by: STUDENT IN AN ORGANIZED HEALTH CARE EDUCATION/TRAINING PROGRAM

## 2022-01-27 PROCEDURE — 3008F PR BODY MASS INDEX (BMI) DOCUMENTED: ICD-10-PCS | Mod: HCNC,CPTII,S$GLB, | Performed by: STUDENT IN AN ORGANIZED HEALTH CARE EDUCATION/TRAINING PROGRAM

## 2022-01-27 PROCEDURE — 3078F DIAST BP <80 MM HG: CPT | Mod: HCNC,CPTII,S$GLB, | Performed by: STUDENT IN AN ORGANIZED HEALTH CARE EDUCATION/TRAINING PROGRAM

## 2022-01-27 PROCEDURE — 99999 PR PBB SHADOW E&M-EST. PATIENT-LVL IV: CPT | Mod: PBBFAC,HCNC,, | Performed by: STUDENT IN AN ORGANIZED HEALTH CARE EDUCATION/TRAINING PROGRAM

## 2022-01-27 PROCEDURE — 1160F PR REVIEW ALL MEDS BY PRESCRIBER/CLIN PHARMACIST DOCUMENTED: ICD-10-PCS | Mod: HCNC,CPTII,S$GLB, | Performed by: STUDENT IN AN ORGANIZED HEALTH CARE EDUCATION/TRAINING PROGRAM

## 2022-01-27 RX ORDER — PANTOPRAZOLE SODIUM 40 MG/1
40 TABLET, DELAYED RELEASE ORAL DAILY
COMMUNITY
Start: 2021-11-30 | End: 2022-02-17 | Stop reason: SDUPTHER

## 2022-01-27 NOTE — TELEPHONE ENCOUNTER
No new care gaps identified.  Powered by [employer]. Reference number: [id number].   1/27/2022 4:28:07 PM CST
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
## 2022-01-27 NOTE — TELEPHONE ENCOUNTER
I updated pt on [facility] being out of the med and called med in to [facility] per pt's request (has appt at [facility] today).

## 2022-01-27 NOTE — TELEPHONE ENCOUNTER
----- Message from Ifrahpablo Oglesby sent at 1/27/2022  1:23 PM CST -----  Contact: [phone]  Requesting an RX refill or new RX.  Is this a refill or new RX: refill 1  RX name and strength (copy/paste from chart):  semaglutide (OZEMPIC) 1 mg/dose (2 mg/1.5 mL) PnIj  Is this a 30 day or 90 day RX:   Patient advised that in the future they can use their [facility] account to request a refill?:   Patient advised that RX refills can take up to 72 hours?:    Pharmacy name and phone # (copy/paste from chart):  [facility] - [facility] [address]   Phone:  [phone]  Fax:  [phone]        Comments: patient stated that she was seen on 01/12/22 asked for Rx to be sent an [facility] has not receive it yet. Please call and advise. Thank you

## 2022-01-27 NOTE — TELEPHONE ENCOUNTER
----- Message from Lorin Mason sent at 1/27/2022  8:16 AM CST -----  Regarding: Medication  Pt [phone] says the pharmacy they did not receive the refill request. She sent someone to pick it up after 5pm and this is what they were told.    Thanks

## 2022-01-28 NOTE — TELEPHONE ENCOUNTER
----- Message from Lauren Gonzalez sent at 1/28/2022 11:38 AM CST -----  Contact: SElf [phone]  Pt requesting a call back in regards to misunderstanding of a meds.    Please call and advise

## 2022-01-31 ENCOUNTER — HOSPITAL ENCOUNTER (OUTPATIENT)
Dept: RADIATION THERAPY | Facility: HOSPITAL | Age: 72
Discharge: HOME | End: 2022-01-31
Attending: RADIOLOGY
Payer: MEDICARE

## 2022-01-31 DIAGNOSIS — C16.9 GASTRIC ADENOCARCINOMA: Primary | ICD-10-CM

## 2022-01-31 PROCEDURE — 77263 THER RADIOLOGY TX PLNG CPLX: CPT | Mod: HCNC,,, | Performed by: STUDENT IN AN ORGANIZED HEALTH CARE EDUCATION/TRAINING PROGRAM

## 2022-01-31 PROCEDURE — 77014 PR  CT GUIDANCE PLACEMENT RAD THERAPY FIELDS: CPT | Mod: 26,HCNC,, | Performed by: STUDENT IN AN ORGANIZED HEALTH CARE EDUCATION/TRAINING PROGRAM

## 2022-01-31 PROCEDURE — 77014 HC CT GUIDANCE RADIATION THERAPY FLDS PLACEMENT: CPT | Mod: TC,HCNC | Performed by: STUDENT IN AN ORGANIZED HEALTH CARE EDUCATION/TRAINING PROGRAM

## 2022-01-31 PROCEDURE — 77334 RADIATION TREATMENT AID(S): CPT | Mod: TC,HCNC | Performed by: STUDENT IN AN ORGANIZED HEALTH CARE EDUCATION/TRAINING PROGRAM

## 2022-01-31 PROCEDURE — 77263 PR  RADIATION THERAPY PLAN COMPLEX: ICD-10-PCS | Mod: HCNC,,, | Performed by: STUDENT IN AN ORGANIZED HEALTH CARE EDUCATION/TRAINING PROGRAM

## 2022-01-31 PROCEDURE — 77014 PR  CT GUIDANCE PLACEMENT RAD THERAPY FIELDS: ICD-10-PCS | Mod: 26,HCNC,, | Performed by: STUDENT IN AN ORGANIZED HEALTH CARE EDUCATION/TRAINING PROGRAM

## 2022-01-31 PROCEDURE — 77334 PR  RADN TREATMENT AID(S) COMPLX: ICD-10-PCS | Mod: 26,HCNC,, | Performed by: STUDENT IN AN ORGANIZED HEALTH CARE EDUCATION/TRAINING PROGRAM

## 2022-01-31 PROCEDURE — 77334 RADIATION TREATMENT AID(S): CPT | Mod: 26,HCNC,, | Performed by: STUDENT IN AN ORGANIZED HEALTH CARE EDUCATION/TRAINING PROGRAM

## 2022-02-01 ENCOUNTER — APPOINTMENT (OUTPATIENT)
Dept: RADIATION THERAPY | Facility: OTHER | Age: 72
End: 2022-02-01
Attending: STUDENT IN AN ORGANIZED HEALTH CARE EDUCATION/TRAINING PROGRAM
Payer: MEDICARE

## 2022-02-01 ENCOUNTER — HOSPITAL ENCOUNTER (OUTPATIENT)
Dept: RADIATION THERAPY | Facility: HOSPITAL | Age: 72
Discharge: HOME | End: 2022-02-01
Attending: STUDENT IN AN ORGANIZED HEALTH CARE EDUCATION/TRAINING PROGRAM
Payer: MEDICARE

## 2022-02-01 RX ORDER — SEMAGLUTIDE 1.34 MG/ML
1 INJECTION, SOLUTION SUBCUTANEOUS
Qty: 2 PEN | Refills: 3 | Status: CANCELLED | OUTPATIENT
Start: 2022-02-01

## 2022-02-01 RX ORDER — SEMAGLUTIDE 1.34 MG/ML
1 INJECTION, SOLUTION SUBCUTANEOUS
Qty: 2 PEN | Refills: 3 | OUTPATIENT
Start: 2022-02-01

## 2022-02-01 RX ORDER — SEMAGLUTIDE 1.34 MG/ML
1 INJECTION, SOLUTION SUBCUTANEOUS
Qty: 2 PEN | Refills: 3 | Status: SHIPPED | OUTPATIENT
Start: 2022-02-01 | End: 2022-06-17

## 2022-02-01 NOTE — TELEPHONE ENCOUNTER
----- Message from Haris Gonzalez sent at 2/1/2022  9:19 AM CST -----  Contact: pt  Requesting an RX refill or new RX.  Is this a refill or new RX: refill  RX name and strength (copy/paste from chart): semaglutide (OZEMPIC) 1 mg/dose (2 mg/1.5 mL) PnIj   Is this a 30 day or 90 day RX:   Pharmacy name and phone # (copy/paste from chart):  [facility] - [place], [place] - [address]  [address] [facility] [address]  Phone: [phone] Fax: [phone]  The doctors have asked that we provide their patients with the following 2 reminders -- prescription refills can take up to 72 hours, and a friendly reminder that in the future you can use your [facility] account to request refills:no

## 2022-02-01 NOTE — TELEPHONE ENCOUNTER
No new care gaps identified.  Powered by [employer]. Reference number: [id number].   2/01/2022 3:01:28 PM CST

## 2022-02-04 ENCOUNTER — OFFICE VISIT (OUTPATIENT)
Dept: HEMATOLOGY/ONCOLOGY | Facility: CLINIC | Age: 72
End: 2022-02-04
Payer: MEDICARE

## 2022-02-04 ENCOUNTER — LAB VISIT (OUTPATIENT)
Dept: LAB | Facility: OTHER | Age: 72
End: 2022-02-04
Attending: STUDENT IN AN ORGANIZED HEALTH CARE EDUCATION/TRAINING PROGRAM
Payer: MEDICARE

## 2022-02-04 VITALS
HEIGHT: 66 IN | OXYGEN SATURATION: 98 % | WEIGHT: 293 LBS | BODY MASS INDEX: 47.09 KG/M2 | RESPIRATION RATE: 18 BRPM | HEART RATE: 67 BPM | DIASTOLIC BLOOD PRESSURE: 61 MMHG | SYSTOLIC BLOOD PRESSURE: 123 MMHG | TEMPERATURE: 98 F

## 2022-02-04 DIAGNOSIS — D84.81 IMMUNODEFICIENCY DUE TO CONDITIONS CLASSIFIED ELSEWHERE: ICD-10-CM

## 2022-02-04 DIAGNOSIS — I10 ESSENTIAL HYPERTENSION: ICD-10-CM

## 2022-02-04 DIAGNOSIS — E66.01 MORBID OBESITY: ICD-10-CM

## 2022-02-04 DIAGNOSIS — C16.9 GASTRIC ADENOCARCINOMA: ICD-10-CM

## 2022-02-04 DIAGNOSIS — C16.9 GASTRIC ADENOCARCINOMA: Primary | ICD-10-CM

## 2022-02-04 DIAGNOSIS — I82.411 DVT OF DEEP FEMORAL VEIN, RIGHT: ICD-10-CM

## 2022-02-04 LAB
ALBUMIN SERPL BCP-MCNC: 3.1 G/DL (ref 3.5–5.2)
ALP SERPL-CCNC: 85 U/L (ref 55–135)
ALT SERPL W/O P-5'-P-CCNC: 11 U/L (ref 10–44)
ANION GAP SERPL CALC-SCNC: 10 MMOL/L (ref 8–16)
AST SERPL-CCNC: 19 U/L (ref 10–40)
BILIRUB SERPL-MCNC: 0.6 MG/DL (ref 0.1–1)
BUN SERPL-MCNC: 11 MG/DL (ref 8–23)
CALCIUM SERPL-MCNC: 9.6 MG/DL (ref 8.7–10.5)
CHLORIDE SERPL-SCNC: 107 MMOL/L (ref 95–110)
CO2 SERPL-SCNC: 27 MMOL/L (ref 23–29)
CREAT SERPL-MCNC: 0.7 MG/DL (ref 0.5–1.4)
ERYTHROCYTE [DISTWIDTH] IN BLOOD BY AUTOMATED COUNT: 14 % (ref 11.5–14.5)
EST. GFR  (AFRICAN AMERICAN): >60 ML/MIN/1.73 M^2
EST. GFR  (NON AFRICAN AMERICAN): >60 ML/MIN/1.73 M^2
FERRITIN SERPL-MCNC: 101 NG/ML (ref 20–300)
GLUCOSE SERPL-MCNC: 105 MG/DL (ref 70–110)
HCT VFR BLD AUTO: 39.2 % (ref 37–48.5)
HGB BLD-MCNC: 11.7 G/DL (ref 12–16)
IMM GRANULOCYTES # BLD AUTO: 0.04 K/UL (ref 0–0.04)
IRON SERPL-MCNC: 52 UG/DL (ref 30–160)
MAGNESIUM SERPL-MCNC: 1.8 MG/DL (ref 1.6–2.6)
MCH RBC QN AUTO: 31.2 PG (ref 27–31)
MCHC RBC AUTO-ENTMCNC: 29.8 G/DL (ref 32–36)
MCV RBC AUTO: 105 FL (ref 82–98)
NEUTROPHILS # BLD AUTO: 7.1 K/UL (ref 1.8–7.7)
PLATELET # BLD AUTO: 277 K/UL (ref 150–450)
PMV BLD AUTO: 10.9 FL (ref 9.2–12.9)
POTASSIUM SERPL-SCNC: 3.5 MMOL/L (ref 3.5–5.1)
PROT SERPL-MCNC: 6.7 G/DL (ref 6–8.4)
RBC # BLD AUTO: 3.75 M/UL (ref 4–5.4)
SATURATED IRON: 16 % (ref 20–50)
SODIUM SERPL-SCNC: 144 MMOL/L (ref 136–145)
TOTAL IRON BINDING CAPACITY: 334 UG/DL (ref 250–450)
TRANSFERRIN SERPL-MCNC: 226 MG/DL (ref 200–375)
WBC # BLD AUTO: 10.25 K/UL (ref 3.9–12.7)

## 2022-02-04 PROCEDURE — 1160F PR REVIEW ALL MEDS BY PRESCRIBER/CLIN PHARMACIST DOCUMENTED: ICD-10-PCS | Mod: HCNC,CPTII,S$GLB, | Performed by: STUDENT IN AN ORGANIZED HEALTH CARE EDUCATION/TRAINING PROGRAM

## 2022-02-04 PROCEDURE — 3008F BODY MASS INDEX DOCD: CPT | Mod: HCNC,CPTII,S$GLB, | Performed by: STUDENT IN AN ORGANIZED HEALTH CARE EDUCATION/TRAINING PROGRAM

## 2022-02-04 PROCEDURE — 3074F PR MOST RECENT SYSTOLIC BLOOD PRESSURE < 130 MM HG: ICD-10-PCS | Mod: HCNC,CPTII,S$GLB, | Performed by: STUDENT IN AN ORGANIZED HEALTH CARE EDUCATION/TRAINING PROGRAM

## 2022-02-04 PROCEDURE — 99215 OFFICE O/P EST HI 40 MIN: CPT | Mod: HCNC,S$GLB,, | Performed by: STUDENT IN AN ORGANIZED HEALTH CARE EDUCATION/TRAINING PROGRAM

## 2022-02-04 PROCEDURE — 1126F AMNT PAIN NOTED NONE PRSNT: CPT | Mod: HCNC,CPTII,S$GLB, | Performed by: STUDENT IN AN ORGANIZED HEALTH CARE EDUCATION/TRAINING PROGRAM

## 2022-02-04 PROCEDURE — 84466 ASSAY OF TRANSFERRIN: CPT | Mod: HCNC | Performed by: STUDENT IN AN ORGANIZED HEALTH CARE EDUCATION/TRAINING PROGRAM

## 2022-02-04 PROCEDURE — 1126F PR PAIN SEVERITY QUANTIFIED, NO PAIN PRESENT: ICD-10-PCS | Mod: HCNC,CPTII,S$GLB, | Performed by: STUDENT IN AN ORGANIZED HEALTH CARE EDUCATION/TRAINING PROGRAM

## 2022-02-04 PROCEDURE — 3074F SYST BP LT 130 MM HG: CPT | Mod: HCNC,CPTII,S$GLB, | Performed by: STUDENT IN AN ORGANIZED HEALTH CARE EDUCATION/TRAINING PROGRAM

## 2022-02-04 PROCEDURE — 36415 COLL VENOUS BLD VENIPUNCTURE: CPT | Mod: HCNC | Performed by: STUDENT IN AN ORGANIZED HEALTH CARE EDUCATION/TRAINING PROGRAM

## 2022-02-04 PROCEDURE — 3008F PR BODY MASS INDEX (BMI) DOCUMENTED: ICD-10-PCS | Mod: HCNC,CPTII,S$GLB, | Performed by: STUDENT IN AN ORGANIZED HEALTH CARE EDUCATION/TRAINING PROGRAM

## 2022-02-04 PROCEDURE — 99999 PR PBB SHADOW E&M-EST. PATIENT-LVL IV: CPT | Mod: PBBFAC,HCNC,, | Performed by: STUDENT IN AN ORGANIZED HEALTH CARE EDUCATION/TRAINING PROGRAM

## 2022-02-04 PROCEDURE — 99499 RISK ADDL DX/OHS AUDIT: ICD-10-PCS | Mod: S$GLB,,, | Performed by: STUDENT IN AN ORGANIZED HEALTH CARE EDUCATION/TRAINING PROGRAM

## 2022-02-04 PROCEDURE — 1101F PT FALLS ASSESS-DOCD LE1/YR: CPT | Mod: HCNC,CPTII,S$GLB, | Performed by: STUDENT IN AN ORGANIZED HEALTH CARE EDUCATION/TRAINING PROGRAM

## 2022-02-04 PROCEDURE — 1160F RVW MEDS BY RX/DR IN RCRD: CPT | Mod: HCNC,CPTII,S$GLB, | Performed by: STUDENT IN AN ORGANIZED HEALTH CARE EDUCATION/TRAINING PROGRAM

## 2022-02-04 PROCEDURE — 1159F PR MEDICATION LIST DOCUMENTED IN MEDICAL RECORD: ICD-10-PCS | Mod: HCNC,CPTII,S$GLB, | Performed by: STUDENT IN AN ORGANIZED HEALTH CARE EDUCATION/TRAINING PROGRAM

## 2022-02-04 PROCEDURE — 80053 COMPREHEN METABOLIC PANEL: CPT | Mod: HCNC | Performed by: STUDENT IN AN ORGANIZED HEALTH CARE EDUCATION/TRAINING PROGRAM

## 2022-02-04 PROCEDURE — 99499 UNLISTED E&M SERVICE: CPT | Mod: S$GLB,,, | Performed by: STUDENT IN AN ORGANIZED HEALTH CARE EDUCATION/TRAINING PROGRAM

## 2022-02-04 PROCEDURE — 3288F FALL RISK ASSESSMENT DOCD: CPT | Mod: HCNC,CPTII,S$GLB, | Performed by: STUDENT IN AN ORGANIZED HEALTH CARE EDUCATION/TRAINING PROGRAM

## 2022-02-04 PROCEDURE — 1101F PR PT FALLS ASSESS DOC 0-1 FALLS W/OUT INJ PAST YR: ICD-10-PCS | Mod: HCNC,CPTII,S$GLB, | Performed by: STUDENT IN AN ORGANIZED HEALTH CARE EDUCATION/TRAINING PROGRAM

## 2022-02-04 PROCEDURE — 3288F PR FALLS RISK ASSESSMENT DOCUMENTED: ICD-10-PCS | Mod: HCNC,CPTII,S$GLB, | Performed by: STUDENT IN AN ORGANIZED HEALTH CARE EDUCATION/TRAINING PROGRAM

## 2022-02-04 PROCEDURE — 82728 ASSAY OF FERRITIN: CPT | Mod: HCNC | Performed by: STUDENT IN AN ORGANIZED HEALTH CARE EDUCATION/TRAINING PROGRAM

## 2022-02-04 PROCEDURE — 1159F MED LIST DOCD IN RCRD: CPT | Mod: HCNC,CPTII,S$GLB, | Performed by: STUDENT IN AN ORGANIZED HEALTH CARE EDUCATION/TRAINING PROGRAM

## 2022-02-04 PROCEDURE — 83735 ASSAY OF MAGNESIUM: CPT | Mod: HCNC | Performed by: STUDENT IN AN ORGANIZED HEALTH CARE EDUCATION/TRAINING PROGRAM

## 2022-02-04 PROCEDURE — 3078F DIAST BP <80 MM HG: CPT | Mod: HCNC,CPTII,S$GLB, | Performed by: STUDENT IN AN ORGANIZED HEALTH CARE EDUCATION/TRAINING PROGRAM

## 2022-02-04 PROCEDURE — 85027 COMPLETE CBC AUTOMATED: CPT | Mod: HCNC | Performed by: STUDENT IN AN ORGANIZED HEALTH CARE EDUCATION/TRAINING PROGRAM

## 2022-02-04 PROCEDURE — 99215 PR OFFICE/OUTPT VISIT, EST, LEVL V, 40-54 MIN: ICD-10-PCS | Mod: HCNC,S$GLB,, | Performed by: STUDENT IN AN ORGANIZED HEALTH CARE EDUCATION/TRAINING PROGRAM

## 2022-02-04 PROCEDURE — 3078F PR MOST RECENT DIASTOLIC BLOOD PRESSURE < 80 MM HG: ICD-10-PCS | Mod: HCNC,CPTII,S$GLB, | Performed by: STUDENT IN AN ORGANIZED HEALTH CARE EDUCATION/TRAINING PROGRAM

## 2022-02-04 PROCEDURE — 99999 PR PBB SHADOW E&M-EST. PATIENT-LVL IV: ICD-10-PCS | Mod: PBBFAC,HCNC,, | Performed by: STUDENT IN AN ORGANIZED HEALTH CARE EDUCATION/TRAINING PROGRAM

## 2022-02-04 NOTE — PROGRESS NOTES
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 she prefers to do 4 days of chemo understanding that it would not be standard.   Case d/w Dr Darden. Plan to start chemo radiation on 2/14/22          DVT right lower extremity  -eliquis prescribed  -duration - atleast as long as she has cancer, may need to be on it lifelong due to obesity and limited movement due to DJD  -tolerating blood thinners without any issues with bleeding      Mild anemia-   Monitor hb, will give 2nd dose of injectafer    GERD  -on ppi , per pcp    HTN/ Obesity/hyperlipidemia- BP controlled, on meds, per pcp   High protein , low calorie diet advised  CHF- compensated on exam today, pt has echo stress test which was abnormal jan 2021, followed by C in Feb 2021 which showed clean coronaries.   EKG 2/2021- sinus rhythm     Problem List Items Addressed This Visit     Gastric adenocarcinoma - Primary    Overview     gastric cancer cT2 or higher         Essential hypertension      Other Visit Diagnoses     DVT of deep femoral vein, right        Immunodeficiency due to conditions classified elsewhere        Morbid obesity              Electronically signed by Cathy Pelaez    [facility]      Future Appointments   Date Time Provider Department Center   2/14/2022  9:00 AM LAB, APPOINTMENT [facility] LAB [facility]   2/14/2022  9:30 AM LAB, SAME DAY [facility]   2/14/2022 10:15  AM ECHO, [facility] NOM ECHOSTR [facility]   2/14/2022 10:30 AM Cathy Pelaez MD [facility] HEM ONC [facility]   2/14/2022 11:00 AM CHEMO 06 [facility]   2/18/2022  1:00 PM INJECTION, [facility] CHEMO [facility] CHEMO [facility]   2/22/2022  9:00 AM Dwight Grayson MD [facility] SLEEP [facility]   2/22/2022 10:30 AM Lincoln Serna MD [facility] CARDIO [facility]   2/22/2022 11:40 AM Ele Ibrahim NP [facility] SLEEP [facility]   3/21/2022 10:00 AM Savanna Hyatt NP [facility] PAINMGT [facility]           This note was created with voice recognition software.  Grammatical, syntax and spelling errors may be inevitable.

## 2022-02-04 NOTE — PLAN OF CARE
DISCONTINUE OFF PATHWAY REGIMEN - Gastroesophageal            Docetaxel (Taxotere)       Oxaliplatin (Eloxatin)       Leucovorin       Fluorouracil           Additional Orders: Premedicate with dexamethasone 8 mg PO bid for three   days beginning 1 day prior to chemotherapy.  Ref: Al-America et al. Lancet Oncol   2016;17: 1697-708    **Always confirm dose/schedule in your pharmacy ordering system**    REASON: Other Reason  PRIOR TREATMENT:   TREATMENT RESPONSE: Partial Response (ND)    START ON PATHWAY REGIMEN - Gastroesophageal    GEOS2        Cisplatin (Platinol)       Fluorouracil           Additional Orders: Chemotherapy given with concurrent radiation therapy.   RT first 5 weeks. Break weeks 6 and 7. See part 2 ordersheet for week 8 and 11   treatment.    **Always confirm dose/schedule in your pharmacy ordering system**        Cisplatin (Platinol)       Fluorouracil           Additional Orders: No radiation is given during weeks 8-11    **Always confirm dose/schedule in your pharmacy ordering system**    Patient Characteristics:  Gastric, Adenocarcinoma, Preoperative or Nonsurgical Candidate (Clinical   Staging), cT1 - cT2, cN0, Nonsurgical Candidate, Radiation Candidate  Histology: Adenocarcinoma  Disease Classification: Gastric  Therapeutic Status: Preoperative or Nonsurgical Candidate (Clinical Staging)  AJCC N Category: cN0  AJCC M Category: cM0  AJCC 8 Stage Grouping: I  AJCC T Category: cT2  Patient Characteristics: Nonsurgical Candidate  Patient Characteristics: Radiation Candidate  Intent of Therapy:  Curative Intent, Discussed with Patient

## 2022-02-04 NOTE — Clinical Note
Plan for chemo single agent 5 FU starting on Monday 2/14/22 and unhook pump on 2/18/22 Friday. Md/lab/tx  Also give injectafer  F/u on PA

## 2022-02-07 ENCOUNTER — TELEPHONE (OUTPATIENT)
Dept: INTERNAL MEDICINE | Facility: CLINIC | Age: 72
End: 2022-02-07
Payer: MEDICARE

## 2022-02-07 NOTE — LETTER
February 8, 2022    DARCY Coats  [address] [place] [address]  [facility] [id number]             Dario Dawkins [facility]  [address]  [facility] [address]  Phone: [phone]  Fax: [phone] Dear Ms. Coats/To whom it may concern:    This letter is to certify that I am the above named patient's primary care doctor.    It is medically necessary that this patient has her rollator walker serviced and repaired.  Please assist with the patient's needed service and repair.    If you have any questions or concerns, please don't hesitate to call.    Sincerely,      MD Lei Rasmussen MD

## 2022-02-08 ENCOUNTER — DOCUMENTATION ONLY (OUTPATIENT)
Dept: ONCOLOGY | Facility: HOSPITAL | Age: 72
End: 2022-02-08
Payer: MEDICARE

## 2022-02-08 NOTE — PROGRESS NOTES
Social Work Consult    Name:DARCY Coats  : 1950  MRN: [id number]    Referral:Transportation     ABIGAIL received consult from Marisela Floyd RN. She is requesting transportation be set up for patient for Echo at 1:45p tomorrow.     ABIGAIL contacted patient who confirmed need as well as need for wheelchair transportation. ABIGAIL called and arranged for a ride with [employer] [phone]. Toan Perez requested all info be sent via email. ABIGAIL did so.    4:45p ABIGAIL called [employer] to obtain status, no determination as of yet.     ABIGAIL told patient to plan on making appointment and ABIGAIL will follow up in AM. ABIGAIL told Marisela the same.

## 2022-02-08 NOTE — TELEPHONE ENCOUNTER
Hi, here is a letter to get her walker serviced. Please ask her where she needs this letter sent to.  Thank you, Lei Garcia

## 2022-02-09 ENCOUNTER — TELEPHONE (OUTPATIENT)
Dept: CARDIOLOGY | Facility: HOSPITAL | Age: 72
End: 2022-02-09
Payer: MEDICARE

## 2022-02-09 ENCOUNTER — DOCUMENTATION ONLY (OUTPATIENT)
Dept: ONCOLOGY | Facility: HOSPITAL | Age: 72
End: 2022-02-09
Payer: MEDICARE

## 2022-02-09 ENCOUNTER — HOSPITAL ENCOUNTER (OUTPATIENT)
Dept: CARDIOLOGY | Facility: HOSPITAL | Age: 72
Discharge: HOME | End: 2022-02-09
Attending: INTERNAL MEDICINE
Payer: MEDICARE

## 2022-02-09 ENCOUNTER — DOCUMENTATION ONLY (OUTPATIENT)
Dept: HEMATOLOGY/ONCOLOGY | Facility: CLINIC | Age: 72
End: 2022-02-09
Payer: MEDICARE

## 2022-02-09 VITALS
HEART RATE: 75 BPM | SYSTOLIC BLOOD PRESSURE: 122 MMHG | WEIGHT: 293 LBS | DIASTOLIC BLOOD PRESSURE: 60 MMHG | BODY MASS INDEX: 47.09 KG/M2 | HEIGHT: 66 IN

## 2022-02-09 DIAGNOSIS — I50.32 CHRONIC DIASTOLIC CONGESTIVE HEART FAILURE: ICD-10-CM

## 2022-02-09 LAB
ASCENDING AORTA: 3.18 CM
AV INDEX (PROSTH): 0.69
AV MEAN GRADIENT: 5 MMHG
AV PEAK GRADIENT: 11 MMHG
AV VALVE AREA: 2.59 CM2
AV VELOCITY RATIO: 0.6
BSA FOR ECHO PROCEDURE: 2.61 M2
CV ECHO LV RWT: 0.4 CM
DOP CALC AO PEAK VEL: 1.64 M/S
DOP CALC AO VTI: 33.74 CM
DOP CALC LVOT AREA: 3.7 CM2
DOP CALC LVOT DIAMETER: 2.18 CM
DOP CALC LVOT PEAK VEL: 0.99 M/S
DOP CALC LVOT STROKE VOLUME: 87.45 CM3
DOP CALCLVOT PEAK VEL VTI: 23.44 CM
E WAVE DECELERATION TIME: 257.7 MSEC
E/A RATIO: 0.84
E/E' RATIO: 17 M/S
ECHO LV POSTERIOR WALL: 1 CM (ref 0.6–1.1)
EJECTION FRACTION: 65 %
FRACTIONAL SHORTENING: 31 % (ref 28–44)
INTERVENTRICULAR SEPTUM: 1.11 CM (ref 0.6–1.1)
IVRT: 68.51 MSEC
LA MAJOR: 6.32 CM
LA MINOR: 6.58 CM
LA WIDTH: 4.24 CM
LEFT ATRIUM SIZE: 4.98 CM
LEFT ATRIUM VOLUME INDEX MOD: 32.3 ML/M2
LEFT ATRIUM VOLUME INDEX: 47.2 ML/M2
LEFT ATRIUM VOLUME MOD: 79.21 CM3
LEFT ATRIUM VOLUME: 115.72 CM3
LEFT INTERNAL DIMENSION IN SYSTOLE: 3.4 CM (ref 2.1–4)
LEFT VENTRICLE DIASTOLIC VOLUME INDEX: 47.26 ML/M2
LEFT VENTRICLE DIASTOLIC VOLUME: 115.79 ML
LEFT VENTRICLE MASS INDEX: 79 G/M2
LEFT VENTRICLE SYSTOLIC VOLUME INDEX: 19.3 ML/M2
LEFT VENTRICLE SYSTOLIC VOLUME: 47.32 ML
LEFT VENTRICULAR INTERNAL DIMENSION IN DIASTOLE: 4.96 CM (ref 3.5–6)
LEFT VENTRICULAR MASS: 193.1 G
LV LATERAL E/E' RATIO: 17 M/S
LV SEPTAL E/E' RATIO: 17 M/S
MV A" WAVE DURATION": 9.99 MSEC
MV PEAK A VEL: 1.22 M/S
MV PEAK E VEL: 1.02 M/S
MV STENOSIS PRESSURE HALF TIME: 74.73 MS
MV VALVE AREA P 1/2 METHOD: 2.94 CM2
PISA TR MAX VEL: 3.1 M/S
PULM VEIN S/D RATIO: 1.58
PV PEAK D VEL: 0.45 M/S
PV PEAK S VEL: 0.71 M/S
RA MAJOR: 5.87 CM
RA PRESSURE: 3 MMHG
RA WIDTH: 4.27 CM
RIGHT VENTRICULAR END-DIASTOLIC DIMENSION: 3.84 CM
RV TISSUE DOPPLER FREE WALL SYSTOLIC VELOCITY 1 (APICAL 4 CHAMBER VIEW): 12.26 CM/S
SINUS: 2.93 CM
STJ: 2.58 CM
TDI LATERAL: 0.06 M/S
TDI SEPTAL: 0.06 M/S
TDI: 0.06 M/S
TR MAX PG: 38 MMHG
TRICUSPID ANNULAR PLANE SYSTOLIC EXCURSION: 3.13 CM
TV REST PULMONARY ARTERY PRESSURE: 41 MMHG

## 2022-02-09 PROCEDURE — 93306 ECHO (CUPID ONLY): ICD-10-PCS | Mod: 26,HCNC,, | Performed by: INTERNAL MEDICINE

## 2022-02-09 PROCEDURE — 93306 TTE W/DOPPLER COMPLETE: CPT | Mod: HCNC

## 2022-02-09 PROCEDURE — 93306 TTE W/DOPPLER COMPLETE: CPT | Mod: 26,HCNC,, | Performed by: INTERNAL MEDICINE

## 2022-02-09 NOTE — TELEPHONE ENCOUNTER
Called patient to update on results of ECHO. EF is preserved but continues to have lower extremity edema and dyspnea despite lasix 40mg po BID.     Instructed patient to increase to Lasix 80mg po BID. She has labs scheduled in a few days and follow up with Dr. Serna in a couple of weeks.     SITA

## 2022-02-09 NOTE — PROGRESS NOTES
"SW received call from Toan at [employer].  was unable to reach patient. SW called patient, no answer. SW called alt number, person answering called another number for conference call. The other person answering stated "They never came to pick her up and her appointment started now! But she's here!" SW explained that her appointment was scheduled for 1:45p and that they had arrived as scheduled (1p). At that point 2nd person answering the phone lost the call or hung up. 1st person answering wasn't able to wasn't able to tell SW who was on the phone except that they didn't have a car.    SW left a message with daughter, explaining confusion. SW saw via Epic that patient had checked in for her appointment.    "

## 2022-02-09 NOTE — PROGRESS NOTES
ABIGAIL had received email from Toan at [facility] that patients transportation had been confirmed. ABIGAIL notified Marisela Floyd RN and spoke to patient. ABIGAIL looking into how return trip is arranged. SW asking Toan via email if [phone] needs to be called.

## 2022-02-10 ENCOUNTER — TELEPHONE (OUTPATIENT)
Dept: INTERNAL MEDICINE | Facility: CLINIC | Age: 72
End: 2022-02-10
Payer: MEDICARE

## 2022-02-10 PROCEDURE — 77301 RADIOTHERAPY DOSE PLAN IMRT: CPT | Mod: TC,HCNC | Performed by: STUDENT IN AN ORGANIZED HEALTH CARE EDUCATION/TRAINING PROGRAM

## 2022-02-10 PROCEDURE — 77301 PR  INTEN MOD RADIOTHER PLAN W/DOSE VOL HIST: ICD-10-PCS | Mod: 26,HCNC,, | Performed by: STUDENT IN AN ORGANIZED HEALTH CARE EDUCATION/TRAINING PROGRAM

## 2022-02-10 PROCEDURE — 77301 RADIOTHERAPY DOSE PLAN IMRT: CPT | Mod: 26,HCNC,, | Performed by: STUDENT IN AN ORGANIZED HEALTH CARE EDUCATION/TRAINING PROGRAM

## 2022-02-10 NOTE — TELEPHONE ENCOUNTER
Spoke with patient regarding letter for repair of rollator walker. Patient requested that I send it to [employer] at fax # [phone]. -Paty MACARIO MA

## 2022-02-11 PROCEDURE — 77470 SPECIAL RADIATION TREATMENT: CPT | Mod: 26,59,HCNC, | Performed by: STUDENT IN AN ORGANIZED HEALTH CARE EDUCATION/TRAINING PROGRAM

## 2022-02-11 PROCEDURE — 77470 SPECIAL RADIATION TREATMENT: CPT | Mod: 59,TC,HCNC | Performed by: STUDENT IN AN ORGANIZED HEALTH CARE EDUCATION/TRAINING PROGRAM

## 2022-02-11 PROCEDURE — 77338 DESIGN MLC DEVICE FOR IMRT: CPT | Mod: 26,HCNC,, | Performed by: STUDENT IN AN ORGANIZED HEALTH CARE EDUCATION/TRAINING PROGRAM

## 2022-02-11 PROCEDURE — 77338 PR  MLC IMRT DESIGN & CONSTRUCTION PER IMRT PLAN: ICD-10-PCS | Mod: 26,HCNC,, | Performed by: STUDENT IN AN ORGANIZED HEALTH CARE EDUCATION/TRAINING PROGRAM

## 2022-02-11 PROCEDURE — 77470 PR  SPECIAL RADIATION TREATMENT: ICD-10-PCS | Mod: 26,59,HCNC, | Performed by: STUDENT IN AN ORGANIZED HEALTH CARE EDUCATION/TRAINING PROGRAM

## 2022-02-11 PROCEDURE — 77300 RADIATION THERAPY DOSE PLAN: CPT | Mod: TC,HCNC | Performed by: STUDENT IN AN ORGANIZED HEALTH CARE EDUCATION/TRAINING PROGRAM

## 2022-02-11 PROCEDURE — 77300 PR RADIATION THERAPY,DOSIMETRY PLAN: ICD-10-PCS | Mod: 26,HCNC,, | Performed by: STUDENT IN AN ORGANIZED HEALTH CARE EDUCATION/TRAINING PROGRAM

## 2022-02-11 PROCEDURE — 77338 DESIGN MLC DEVICE FOR IMRT: CPT | Mod: TC,HCNC | Performed by: STUDENT IN AN ORGANIZED HEALTH CARE EDUCATION/TRAINING PROGRAM

## 2022-02-11 PROCEDURE — 77300 RADIATION THERAPY DOSE PLAN: CPT | Mod: 26,HCNC,, | Performed by: STUDENT IN AN ORGANIZED HEALTH CARE EDUCATION/TRAINING PROGRAM

## 2022-02-14 ENCOUNTER — INFUSION (OUTPATIENT)
Dept: INFUSION THERAPY | Facility: OTHER | Age: 72
End: 2022-02-14
Attending: STUDENT IN AN ORGANIZED HEALTH CARE EDUCATION/TRAINING PROGRAM
Payer: MEDICARE

## 2022-02-14 ENCOUNTER — OFFICE VISIT (OUTPATIENT)
Dept: HEMATOLOGY/ONCOLOGY | Facility: CLINIC | Age: 72
End: 2022-02-14
Payer: MEDICARE

## 2022-02-14 ENCOUNTER — TELEPHONE (OUTPATIENT)
Dept: CARDIOLOGY | Facility: CLINIC | Age: 72
End: 2022-02-14
Payer: MEDICARE

## 2022-02-14 VITALS
BODY MASS INDEX: 47.09 KG/M2 | WEIGHT: 293 LBS | RESPIRATION RATE: 16 BRPM | OXYGEN SATURATION: 98 % | HEIGHT: 66 IN | DIASTOLIC BLOOD PRESSURE: 53 MMHG | HEART RATE: 78 BPM | SYSTOLIC BLOOD PRESSURE: 102 MMHG | TEMPERATURE: 98 F

## 2022-02-14 DIAGNOSIS — C16.9 GASTRIC ADENOCARCINOMA: Primary | ICD-10-CM

## 2022-02-14 DIAGNOSIS — E66.01 MORBID OBESITY: ICD-10-CM

## 2022-02-14 DIAGNOSIS — D84.81 IMMUNODEFICIENCY DUE TO CONDITIONS CLASSIFIED ELSEWHERE: ICD-10-CM

## 2022-02-14 DIAGNOSIS — D50.0 IRON DEFICIENCY ANEMIA SECONDARY TO BLOOD LOSS (CHRONIC): Primary | ICD-10-CM

## 2022-02-14 DIAGNOSIS — I82.411 DVT OF DEEP FEMORAL VEIN, RIGHT: ICD-10-CM

## 2022-02-14 DIAGNOSIS — I10 ESSENTIAL HYPERTENSION: ICD-10-CM

## 2022-02-14 DIAGNOSIS — I50.32 CHRONIC DIASTOLIC CONGESTIVE HEART FAILURE: ICD-10-CM

## 2022-02-14 DIAGNOSIS — C16.9 GASTRIC ADENOCARCINOMA: ICD-10-CM

## 2022-02-14 PROCEDURE — 3074F SYST BP LT 130 MM HG: CPT | Mod: HCNC,CPTII,S$GLB, | Performed by: STUDENT IN AN ORGANIZED HEALTH CARE EDUCATION/TRAINING PROGRAM

## 2022-02-14 PROCEDURE — 96365 THER/PROPH/DIAG IV INF INIT: CPT | Mod: HCNC

## 2022-02-14 PROCEDURE — 1159F MED LIST DOCD IN RCRD: CPT | Mod: HCNC,CPTII,S$GLB, | Performed by: STUDENT IN AN ORGANIZED HEALTH CARE EDUCATION/TRAINING PROGRAM

## 2022-02-14 PROCEDURE — 99999 PR PBB SHADOW E&M-EST. PATIENT-LVL IV: ICD-10-PCS | Mod: PBBFAC,HCNC,, | Performed by: STUDENT IN AN ORGANIZED HEALTH CARE EDUCATION/TRAINING PROGRAM

## 2022-02-14 PROCEDURE — 3008F BODY MASS INDEX DOCD: CPT | Mod: HCNC,CPTII,S$GLB, | Performed by: STUDENT IN AN ORGANIZED HEALTH CARE EDUCATION/TRAINING PROGRAM

## 2022-02-14 PROCEDURE — 99215 PR OFFICE/OUTPT VISIT, EST, LEVL V, 40-54 MIN: ICD-10-PCS | Mod: HCNC,S$GLB,, | Performed by: STUDENT IN AN ORGANIZED HEALTH CARE EDUCATION/TRAINING PROGRAM

## 2022-02-14 PROCEDURE — 77386 HC IMRT, COMPLEX: CPT | Mod: HCNC | Performed by: STUDENT IN AN ORGANIZED HEALTH CARE EDUCATION/TRAINING PROGRAM

## 2022-02-14 PROCEDURE — 99499 RISK ADDL DX/OHS AUDIT: ICD-10-PCS | Mod: S$GLB,,, | Performed by: STUDENT IN AN ORGANIZED HEALTH CARE EDUCATION/TRAINING PROGRAM

## 2022-02-14 PROCEDURE — 77014 PR  CT GUIDANCE PLACEMENT RAD THERAPY FIELDS: CPT | Mod: 26,HCNC,, | Performed by: STUDENT IN AN ORGANIZED HEALTH CARE EDUCATION/TRAINING PROGRAM

## 2022-02-14 PROCEDURE — 25000003 PHARM REV CODE 250: Mod: HCNC | Performed by: STUDENT IN AN ORGANIZED HEALTH CARE EDUCATION/TRAINING PROGRAM

## 2022-02-14 PROCEDURE — 96375 TX/PRO/DX INJ NEW DRUG ADDON: CPT | Mod: HCNC

## 2022-02-14 PROCEDURE — 1159F PR MEDICATION LIST DOCUMENTED IN MEDICAL RECORD: ICD-10-PCS | Mod: HCNC,CPTII,S$GLB, | Performed by: STUDENT IN AN ORGANIZED HEALTH CARE EDUCATION/TRAINING PROGRAM

## 2022-02-14 PROCEDURE — 3288F FALL RISK ASSESSMENT DOCD: CPT | Mod: HCNC,CPTII,S$GLB, | Performed by: STUDENT IN AN ORGANIZED HEALTH CARE EDUCATION/TRAINING PROGRAM

## 2022-02-14 PROCEDURE — 1101F PT FALLS ASSESS-DOCD LE1/YR: CPT | Mod: HCNC,CPTII,S$GLB, | Performed by: STUDENT IN AN ORGANIZED HEALTH CARE EDUCATION/TRAINING PROGRAM

## 2022-02-14 PROCEDURE — 3074F PR MOST RECENT SYSTOLIC BLOOD PRESSURE < 130 MM HG: ICD-10-PCS | Mod: HCNC,CPTII,S$GLB, | Performed by: STUDENT IN AN ORGANIZED HEALTH CARE EDUCATION/TRAINING PROGRAM

## 2022-02-14 PROCEDURE — 77014 HC CT GUIDANCE RADIATION THERAPY FLDS PLACEMENT: CPT | Mod: TC,HCNC | Performed by: STUDENT IN AN ORGANIZED HEALTH CARE EDUCATION/TRAINING PROGRAM

## 2022-02-14 PROCEDURE — 1160F PR REVIEW ALL MEDS BY PRESCRIBER/CLIN PHARMACIST DOCUMENTED: ICD-10-PCS | Mod: HCNC,CPTII,S$GLB, | Performed by: STUDENT IN AN ORGANIZED HEALTH CARE EDUCATION/TRAINING PROGRAM

## 2022-02-14 PROCEDURE — 99999 PR PBB SHADOW E&M-EST. PATIENT-LVL IV: CPT | Mod: PBBFAC,HCNC,, | Performed by: STUDENT IN AN ORGANIZED HEALTH CARE EDUCATION/TRAINING PROGRAM

## 2022-02-14 PROCEDURE — 63600175 PHARM REV CODE 636 W HCPCS: Mod: JG,HCNC | Performed by: STUDENT IN AN ORGANIZED HEALTH CARE EDUCATION/TRAINING PROGRAM

## 2022-02-14 PROCEDURE — 3008F PR BODY MASS INDEX (BMI) DOCUMENTED: ICD-10-PCS | Mod: HCNC,CPTII,S$GLB, | Performed by: STUDENT IN AN ORGANIZED HEALTH CARE EDUCATION/TRAINING PROGRAM

## 2022-02-14 PROCEDURE — 3078F DIAST BP <80 MM HG: CPT | Mod: HCNC,CPTII,S$GLB, | Performed by: STUDENT IN AN ORGANIZED HEALTH CARE EDUCATION/TRAINING PROGRAM

## 2022-02-14 PROCEDURE — 96416 CHEMO PROLONG INFUSE W/PUMP: CPT | Mod: HCNC

## 2022-02-14 PROCEDURE — 3288F PR FALLS RISK ASSESSMENT DOCUMENTED: ICD-10-PCS | Mod: HCNC,CPTII,S$GLB, | Performed by: STUDENT IN AN ORGANIZED HEALTH CARE EDUCATION/TRAINING PROGRAM

## 2022-02-14 PROCEDURE — 1160F RVW MEDS BY RX/DR IN RCRD: CPT | Mod: HCNC,CPTII,S$GLB, | Performed by: STUDENT IN AN ORGANIZED HEALTH CARE EDUCATION/TRAINING PROGRAM

## 2022-02-14 PROCEDURE — 99499 UNLISTED E&M SERVICE: CPT | Mod: S$GLB,,, | Performed by: STUDENT IN AN ORGANIZED HEALTH CARE EDUCATION/TRAINING PROGRAM

## 2022-02-14 PROCEDURE — 3078F PR MOST RECENT DIASTOLIC BLOOD PRESSURE < 80 MM HG: ICD-10-PCS | Mod: HCNC,CPTII,S$GLB, | Performed by: STUDENT IN AN ORGANIZED HEALTH CARE EDUCATION/TRAINING PROGRAM

## 2022-02-14 PROCEDURE — 99215 OFFICE O/P EST HI 40 MIN: CPT | Mod: HCNC,S$GLB,, | Performed by: STUDENT IN AN ORGANIZED HEALTH CARE EDUCATION/TRAINING PROGRAM

## 2022-02-14 PROCEDURE — 77014 PR  CT GUIDANCE PLACEMENT RAD THERAPY FIELDS: ICD-10-PCS | Mod: 26,HCNC,, | Performed by: STUDENT IN AN ORGANIZED HEALTH CARE EDUCATION/TRAINING PROGRAM

## 2022-02-14 PROCEDURE — 1101F PR PT FALLS ASSESS DOC 0-1 FALLS W/OUT INJ PAST YR: ICD-10-PCS | Mod: HCNC,CPTII,S$GLB, | Performed by: STUDENT IN AN ORGANIZED HEALTH CARE EDUCATION/TRAINING PROGRAM

## 2022-02-14 RX ORDER — DIPHENHYDRAMINE HYDROCHLORIDE 50 MG/ML
50 INJECTION INTRAMUSCULAR; INTRAVENOUS ONCE AS NEEDED
Status: DISCONTINUED | OUTPATIENT
Start: 2022-02-14 | End: 2022-02-14 | Stop reason: HOSPADM

## 2022-02-14 RX ORDER — HEPARIN 100 UNIT/ML
5 SYRINGE INTRAVENOUS
Status: DISCONTINUED | OUTPATIENT
Start: 2022-02-14 | End: 2022-02-14 | Stop reason: HOSPADM

## 2022-02-14 RX ORDER — HEPARIN 100 UNIT/ML
500 SYRINGE INTRAVENOUS
Status: DISCONTINUED | OUTPATIENT
Start: 2022-02-14 | End: 2022-02-14 | Stop reason: HOSPADM

## 2022-02-14 RX ORDER — DIPHENHYDRAMINE HYDROCHLORIDE 50 MG/ML
50 INJECTION INTRAMUSCULAR; INTRAVENOUS ONCE AS NEEDED
Status: CANCELLED | OUTPATIENT
Start: 2022-02-14

## 2022-02-14 RX ORDER — SODIUM CHLORIDE 9 MG/ML
INJECTION, SOLUTION INTRAVENOUS CONTINUOUS
Status: DISCONTINUED | OUTPATIENT
Start: 2022-02-14 | End: 2022-02-14 | Stop reason: HOSPADM

## 2022-02-14 RX ORDER — EPINEPHRINE 0.3 MG/.3ML
0.3 INJECTION SUBCUTANEOUS ONCE AS NEEDED
Status: CANCELLED | OUTPATIENT
Start: 2022-02-14

## 2022-02-14 RX ORDER — HEPARIN 100 UNIT/ML
500 SYRINGE INTRAVENOUS
Status: CANCELLED | OUTPATIENT
Start: 2022-02-14

## 2022-02-14 RX ORDER — HEPARIN 100 UNIT/ML
5 SYRINGE INTRAVENOUS
Status: CANCELLED | OUTPATIENT
Start: 2022-02-21

## 2022-02-14 RX ORDER — SODIUM CHLORIDE 0.9 % (FLUSH) 0.9 %
10 SYRINGE (ML) INJECTION
Status: DISCONTINUED | OUTPATIENT
Start: 2022-02-14 | End: 2022-02-14 | Stop reason: HOSPADM

## 2022-02-14 RX ORDER — METHYLPREDNISOLONE SOD SUCC 125 MG
125 VIAL (EA) INJECTION ONCE AS NEEDED
Status: CANCELLED | OUTPATIENT
Start: 2022-02-21

## 2022-02-14 RX ORDER — SODIUM CHLORIDE 0.9 % (FLUSH) 0.9 %
10 SYRINGE (ML) INJECTION
Status: CANCELLED | OUTPATIENT
Start: 2022-02-14

## 2022-02-14 RX ORDER — SODIUM CHLORIDE 9 MG/ML
INJECTION, SOLUTION INTRAVENOUS CONTINUOUS
Status: CANCELLED | OUTPATIENT
Start: 2022-02-21

## 2022-02-14 RX ORDER — SODIUM CHLORIDE 0.9 % (FLUSH) 0.9 %
10 SYRINGE (ML) INJECTION
Status: CANCELLED | OUTPATIENT
Start: 2022-02-21

## 2022-02-14 RX ORDER — DIPHENHYDRAMINE HYDROCHLORIDE 50 MG/ML
50 INJECTION INTRAMUSCULAR; INTRAVENOUS ONCE AS NEEDED
Status: CANCELLED | OUTPATIENT
Start: 2022-02-21

## 2022-02-14 RX ORDER — SODIUM CHLORIDE 0.9 % (FLUSH) 0.9 %
10 SYRINGE (ML) INJECTION
Status: CANCELLED | OUTPATIENT
Start: 2022-02-18

## 2022-02-14 RX ORDER — EPINEPHRINE 0.3 MG/.3ML
0.3 INJECTION SUBCUTANEOUS ONCE AS NEEDED
Status: DISCONTINUED | OUTPATIENT
Start: 2022-02-14 | End: 2022-02-14

## 2022-02-14 RX ORDER — EPINEPHRINE 0.3 MG/.3ML
0.3 INJECTION SUBCUTANEOUS ONCE AS NEEDED
Status: DISCONTINUED | OUTPATIENT
Start: 2022-02-14 | End: 2022-02-14 | Stop reason: HOSPADM

## 2022-02-14 RX ORDER — METHYLPREDNISOLONE SOD SUCC 125 MG
125 VIAL (EA) INJECTION ONCE AS NEEDED
Status: DISCONTINUED | OUTPATIENT
Start: 2022-02-14 | End: 2022-02-14 | Stop reason: HOSPADM

## 2022-02-14 RX ORDER — HEPARIN 100 UNIT/ML
500 SYRINGE INTRAVENOUS
Status: CANCELLED | OUTPATIENT
Start: 2022-02-18

## 2022-02-14 RX ORDER — EPINEPHRINE 0.3 MG/.3ML
0.3 INJECTION SUBCUTANEOUS ONCE AS NEEDED
Status: CANCELLED | OUTPATIENT
Start: 2022-02-21

## 2022-02-14 RX ORDER — ONDANSETRON 2 MG/ML
8 INJECTION INTRAMUSCULAR; INTRAVENOUS
Status: CANCELLED | OUTPATIENT
Start: 2022-02-14

## 2022-02-14 RX ORDER — ONDANSETRON 2 MG/ML
8 INJECTION INTRAMUSCULAR; INTRAVENOUS
Status: COMPLETED | OUTPATIENT
Start: 2022-02-14 | End: 2022-02-14

## 2022-02-14 RX ADMIN — SODIUM CHLORIDE: 0.9 INJECTION, SOLUTION INTRAVENOUS at 11:02

## 2022-02-14 RX ADMIN — ONDANSETRON 8 MG: 2 INJECTION INTRAMUSCULAR; INTRAVENOUS at 12:02

## 2022-02-14 RX ADMIN — FLUOROURACIL 2350 MG: 50 INJECTION, SOLUTION INTRAVENOUS at 12:02

## 2022-02-14 RX ADMIN — FERRIC CARBOXYMALTOSE INJECTION 750 MG: 50 INJECTION, SOLUTION INTRAVENOUS at 11:02

## 2022-02-14 NOTE — Clinical Note
Chemo signed  RTC Friday to unhook  RTC 1,2,3,4 md/labs/tx  Cbc, cmp, mag Needs to go  chemo  from o [facility].

## 2022-02-14 NOTE — PLAN OF CARE
Vital Signs Stable, No apparent distress noted; Pt tolerated __Injectafer and 5FU chemo home infusion pump w/o difficulty.  Port-a-cath (accessed w/a 20g, 0.75in Foster, biopatch in place), flushed, Positive blood return noted.  Chemo pump to run for 96hrs at 2.5ml/hr, secured to tylor cath.  Discharge instructions given;Pt verbalizes understanding. Next appointments scheduled  [facility] in wheelchair[facility]

## 2022-02-14 NOTE — PROGRESS NOTES
Hematology- Oncology Clinic Note :      2/14/2022    RFV / chief complaint- Gastric Cancer        HPI  Pt is a 71 y.o. 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   Pt presents to the clinic today for gastric cancer    Doing well. Leg swelling better. She has some areas where the fluid in her legs had started seeping out locally. She has been applying some wound ointment on those spots. No fever or redness in the area. Takes fluids pills per cardiology  No bleeding reported.  Tolerating Eliquis without any issues.    Pt was being evaluated for gastric bypass surgery   Feb 2021 EGD- Gastric tumor in the prepyloric region of the stomach,  March 2021 EUS- Gastric tumor in the prepyloric region of the stomach.  Both above biopsies did not show malignancy  5/25/21 EGD- Gastric tumor on the posterior wall of the gastric antrum. Complete removal was accomplished.         Reviewed past medical/surgical/social history    Past Medical History:   Diagnosis Date    YOUNG (acute kidney injury) 10/15/2021    Anemia     2018    Arthritis     BMI 60.0-69.9, adult     Cataract     Chronic diastolic congestive heart failure 1/27/2021    Depression     Dry eye syndrome     Gastric adenocarcinoma 5/25/2021    GERD (gastroesophageal reflux disease)     Glaucoma suspect     History of gastric ulcer     Hyperlipidemia     Hypertension     Hypothyroidism     Morbid obesity     Neuromuscular disorder     Sleep apnea     Thyroid disease       Past Surgical History:   Procedure Laterality Date    APPENDECTOMY       CARDIAC SURGERY      CATARACT EXTRACTION W/  INTRAOCULAR LENS IMPLANT Bilateral     MFIOL OU    CORONARY ANGIOGRAPHY Bilateral 2/24/2021    Procedure: ANGIOGRAM, CORONARY ARTERY;  Surgeon: Cresencio Ridley MD;  Location: [facility] CATH LAB;  Service: Cardiology;  Laterality: Bilateral;  Covid test needed - ok per Danay SSCU. Pt. w/o transportation or family    ENDOSCOPIC ULTRASOUND OF UPPER GASTROINTESTINAL TRACT N/A 3/17/2021    Procedure: ULTRASOUND, UPPER GI TRACT, ENDOSCOPIC;  Surgeon: Gerald Anaya MD;  Location: [facility] SAM (2ND FLR);  Service: Endoscopy;  Laterality: N/A;  Pt unable to get a ride for swab. Will do rapid test at 8:30 - ttr    ENDOSCOPIC ULTRASOUND OF UPPER GASTROINTESTINAL TRACT N/A 1/13/2022    Procedure: ULTRASOUND, UPPER GI TRACT, ENDOSCOPIC;  Surgeon: Kevin Carballo MD;  Location: [facility] SAM (2ND FLR);  Service: Endoscopy;  Laterality: N/A;  blood thinner approval received, see telephone encounter 1/7/22-BB  rapid  instructions given verbally and sent to address on file in pt's chart-BB    ESOPHAGOGASTRODUODENOSCOPY N/A 2/5/2021    Procedure: EGD (ESOPHAGOGASTRODUODENOSCOPY);  Surgeon: Matthew Hernadez MD;  Location: [facility] SAM (2ND FLR);  Service: Endoscopy;  Laterality: N/A;  BMI-58    Wt: 361#     COVID test at [facility] on 2/2-GT    ESOPHAGOGASTRODUODENOSCOPY N/A 3/17/2021    Procedure: EGD (ESOPHAGOGASTRODUODENOSCOPY);  Surgeon: Gerald Anaya MD;  Location: [facility] SAM (2ND FLR);  Service: Endoscopy;  Laterality: N/A;    ESOPHAGOGASTRODUODENOSCOPY N/A 5/25/2021    Procedure: EGD (ESOPHAGOGASTRODUODENOSCOPY);  Surgeon: Kevin Carballo MD;  Location: [facility] SAM (2ND FLR);  Service: Endoscopy;  Laterality: N/A;  ESD 2 hours  Full COVID vaccination on file. ttr    ESOPHAGOGASTRODUODENOSCOPY N/A 1/13/2022    Procedure: EGD (ESOPHAGOGASTRODUODENOSCOPY);  Surgeon: Kevin Carballo MD;  Location: [facility] SAM (2ND FLR);  Service: Endoscopy;  Laterality: N/A;  blood thinner approval, see  telephone encounter 22-BB  rapid  instructions given verbally and sent to address on file in pt's chart-BB    EYE SURGERY      INJECTION OF ANESTHETIC AGENT AROUND NERVE Left 7/10/2018    Procedure: BLOCK, NERVE;  Surgeon: Samantha Vidal MD;  Location: [facility], [place] PAIN MGT;  Service: Pain Management;  Laterality: Left;  Genicular     INJECTION OF ANESTHETIC AGENT AROUND NERVE Left 2019    Procedure: Block, Nerve NERVE BLOCK GENICULAR WITH PHENOL 6%;  Surgeon: Samantha Vidal MD;  Location: [facility], [place] PAIN MGT;  Service: Pain Management;  Laterality: Left;  NEEDS CONSENT    INSERTION OF TUNNELED CENTRAL VENOUS CATHETER (CVC) WITH SUBCUTANEOUS PORT N/A 2021    Procedure: INSERTION, PORT-A-CATH;  Surgeon: Mario Paz MD;  Location: [facility] [place] CATH LAB;  Service: Radiology;  Laterality: N/A;  PORT PLACEMENT    RADIOFREQUENCY ABLATION Left 2020    Procedure: RADIOFREQUENCY ABLATION LEFT GENICULAR;  Surgeon: Tevin Clark MD;  Location: [facility] [place] PAIN MGT;  Service: Pain Management;  Laterality: Left;  Left Genicular RFA    RADIOFREQUENCY ABLATION Left 2021    Procedure: RADIOFREQUENCY ABLATION LEFT GENICULAR;  Surgeon: Tevin Clark MD;  Location: [facility] [place] PAIN MGT;  Service: Pain Management;  Laterality: Left;    RADIOFREQUENCY ABLATION Left 2021    Procedure: RADIOFREQUENCY ABLATION, GENICULAR COOLED NEED CONSENT;  Surgeon: Tevin Clark MD;  Location: [facility] [place] PAIN MGT;  Service: Pain Management;  Laterality: Left;    TONSILLECTOMY        (Not in a hospital admission)    Review of patient's allergies indicates:  No Known Allergies   Social History     Tobacco Use    Smoking status: Current Some Day Smoker     Packs/day: 0.25     Years: 50.00     Pack years: 12.50     Types: Cigarettes     Last attempt to quit: 2020     Years since quittin.1    Smokeless tobacco: Never Used   Substance Use Topics    Alcohol use: Yes     Comment: meg      Family History   Problem Relation Age of Onset  "   No Known Problems Mother     No Known Problems Father     Colon cancer Neg Hx     Esophageal cancer Neg Hx           Review of Systems :  Review of Systems   Constitutional: Positive for malaise/fatigue. Negative for chills, diaphoresis, fever and weight loss.   HENT: Negative.  Negative for congestion, hearing loss, nosebleeds, sore throat and tinnitus.    Eyes: Negative.  Negative for blurred vision and discharge.   Respiratory: Negative for cough, hemoptysis, sputum production, shortness of breath and wheezing.    Cardiovascular: Negative.  Negative for chest pain, palpitations and leg swelling.   Gastrointestinal: Negative for abdominal pain, blood in stool, constipation, diarrhea, heartburn, melena, nausea and vomiting.   Genitourinary: Negative.    Musculoskeletal: Positive for joint pain. Negative for back pain, falls and myalgias.   Skin: Negative for itching and rash.   Neurological: Negative for dizziness, tingling, sensory change, speech change, focal weakness, seizures, loss of consciousness, weakness and headaches.   Endo/Heme/Allergies: Negative.  Does not bruise/bleed easily.   Psychiatric/Behavioral: Negative.  Negative for depression. The patient is not nervous/anxious and does not have insomnia.                Physical Exam :  BP (!) 102/53 (BP Location: Left arm, Patient Position: Sitting, BP Method: Medium (Automatic))   Pulse 78   Temp 98.1 °F (36.7 °C) (Oral)   Resp 16   Ht 5' 6" (1.676 m)   Wt (!) 144.1 kg (317 lb 10.9 oz)   SpO2 98%   BMI 51.28 kg/m²   Wt Readings from Last 3 Encounters:   02/14/22 (!) 144.1 kg (317 lb 10.9 oz)   02/09/22 (!) 146.1 kg (322 lb)   02/04/22 (!) 146.1 kg (322 lb 1.5 oz)       Body mass index is 51.28 kg/m².      Physical Exam  Vitals and nursing note reviewed.   Constitutional:       General: She is not in acute distress.     Appearance: She is well-developed.   HENT:      Head: Normocephalic and atraumatic.      Mouth/Throat:      Pharynx: No " oropharyngeal exudate.   Eyes:      General: No scleral icterus.     Conjunctiva/sclera: Conjunctivae normal.   Neck:      Thyroid: No thyromegaly.      Trachea: No tracheal deviation.   Cardiovascular:      Rate and Rhythm: Normal rate and regular rhythm.      Heart sounds: Normal heart sounds. No murmur heard.      Pulmonary:      Effort: Pulmonary effort is normal. No respiratory distress.      Breath sounds: Normal breath sounds. No wheezing or rales.      Comments: Port SOI  Chest:   Breasts:      Right: No swelling, nipple discharge, skin change or tenderness.       Abdominal:      General: Bowel sounds are normal. There is no distension (obese).      Palpations: Abdomen is soft. There is no mass.      Tenderness: There is no abdominal tenderness. There is no rebound.   Musculoskeletal:         General: Swelling (much improved) present. No tenderness. Normal range of motion.      Cervical back: Normal range of motion and neck supple.      Right lower leg: Edema present.      Left lower leg: Edema present.   Lymphadenopathy:      Cervical: No cervical adenopathy.   Skin:     General: Skin is warm.      Findings: Wound present. No erythema or rash.   Neurological:      Mental Status: She is alert and oriented to person, place, and time.      Cranial Nerves: No cranial nerve deficit.      Gait: Gait abnormal (uses cane).   Psychiatric:         Behavior: Behavior normal.             Current Outpatient Medications   Medication Sig Dispense Refill    acetaminophen (TYLENOL) 500 MG tablet       amLODIPine (NORVASC) 10 MG tablet Take 1 tablet (10 mg total) by mouth once daily. 90 tablet 11    atorvastatin (LIPITOR) 20 MG tablet Take 1 tablet (20 mg total) by mouth once daily. 90 tablet 11    B-complex with vitamin C (Z-BEC OR EQUIV) tablet Take 1 tablet by mouth once daily.       CALCIUM CITRATE-VITAMIN D2 ORAL Take 1 tablet by mouth once daily.       citalopram (CELEXA) 10 MG tablet Take 1 tablet (10 mg total)  by mouth once daily. 90 tablet 3    ELIQUIS 5 mg Tab TAKE 1 TABLET TWICE DAILY 180 tablet 2    ferrous gluconate (FERGON) 324 MG tablet Take 1 tablet (324 mg total) by mouth daily with breakfast. 90 tablet 11    furosemide (LASIX) 40 MG tablet Take 1 tablet (40 mg total) by mouth 2 (two) times daily. (Patient taking differently: Take 40 mg by mouth 2 (two) times daily. Patient current dose--2 tablets twice daily) 60 tablet 11    furosemide (LASIX) 40 MG tablet Take one tablet by mouth two times daily 60 tablet 11    gabapentin (NEURONTIN) 300 MG capsule Take 2 capsules (600 mg total) by mouth 3 (three) times daily. 540 capsule 2    LIDOcaine-prilocaine (EMLA) cream Apply topically as needed (prior to port access). 30 g 0    lisinopriL (PRINIVIL,ZESTRIL) 40 MG tablet Take 1 tablet (40 mg total) by mouth once daily. 90 tablet 11    multivitamin with minerals tablet Take 1 tablet by mouth once daily.       oxyCODONE-acetaminophen (PERCOCET) 5-325 mg per tablet Take 1 tablet by mouth every 8 (eight) hours as needed for Pain. 90 tablet 0    pantoprazole (PROTONIX) 40 MG tablet Take 40 mg by mouth Daily.      semaglutide (OZEMPIC) 1 mg/dose (2 mg/1.5 mL) PnIj Inject 1 mg into the skin every 7 days. 2 pen 3     No current facility-administered medications for this visit.     Facility-Administered Medications Ordered in Other Visits   Medication Dose Route Frequency Provider Last Rate Last Admin    0.9%  NaCl infusion   Intravenous Continuous Tevin Clark MD   Stopped at 01/13/22 1055    0.9%  NaCl infusion   Intravenous Continuous Cathy Pelaez MD        alteplase injection 2 mg  2 mg Intra-Catheter PRN Cahty Pelaez MD        diphenhydrAMINE injection 50 mg  50 mg Intravenous Once PRN Cathy Pelaez MD        diphenhydrAMINE injection 50 mg  50 mg Intravenous Once PRN Cathy Pelaez MD        EPINEPHrine (EPIPEN) 0.3 mg/0.3 mL pen injection 0.3 mg  0.3 mg Intramuscular Once PRN Cathy Pelaez MD         ferric carboxymaltose (INJECTAFER) 750 mg in sodium chloride 0.9% 265 mL IVPB (ready to mix system)  750 mg Intravenous Once Cathy Pelaez  mL/hr at 02/14/22 1109 750 mg at 02/14/22 1109    fluorouracil (ADRUCIL) 225 mg/m2/day = 2,350 mg in sodium chloride 0.9% chemo infusion  225 mg/m2/day (Treatment Plan Recorded) Intravenous over 96 hr Cathy Pelaez MD        heparin, porcine (PF) 100 unit/mL injection flush 500 Units  5 mL Intravenous PRN Cathy Pelaez MD        heparin, porcine (PF) 100 unit/mL injection flush 500 Units  500 Units Intravenous PRN Cathy Pelaez MD        heparin, porcine (PF) 100 unit/mL injection flush 500 Units  500 Units Intravenous PRN Cathy Pelaez MD        methylPREDNISolone sodium succinate injection 125 mg  125 mg Intravenous Once PRN Cathy Pelaez MD        ondansetron injection 8 mg  8 mg Intravenous 1 time in Clinic/FREIDA Pelaez MD        sodium chloride 0.9% 100 mL flush bag   Intravenous PRN Cathy Pelaez MD 25 mL/hr at 02/14/22 1109 New Bag at 02/14/22 1109    sodium chloride 0.9% 250 mL flush bag   Intravenous 1 time in Clinic/FREIDA Pelaez MD        sodium chloride 0.9% bolus 1,000 mL  1,000 mL Intravenous PRN Cathy Pelaez MD        sodium chloride 0.9% flush 10 mL  10 mL Intravenous PRN Cathy Pelaez MD        sodium chloride 0.9% flush 10 mL  10 mL Intravenous JOSEPH Pelaez MD        sodium chloride 0.9% flush 10 mL  10 mL Intravenous JOSEPH Pelaez MD           Pertinent Diagnostic studies:      Lab Visit on 02/14/2022   Component Date Value Ref Range Status    Magnesium 02/14/2022 2.1  1.6 - 2.6 mg/dL Final    WBC 02/14/2022 10.39  3.90 - 12.70 K/uL Final    RBC 02/14/2022 3.92* 4.00 - 5.40 M/uL Final    Hemoglobin 02/14/2022 12.3  12.0 - 16.0 g/dL Final    Hematocrit 02/14/2022 40.3  37.0 - 48.5 % Final    MCV 02/14/2022 103* 82 - 98 fL Final    MCH 02/14/2022 31.4* 27.0 - 31.0 pg Final    MCHC 02/14/2022 30.5* 32.0 - 36.0 g/dL  Final    RDW 02/14/2022 13.3  11.5 - 14.5 % Final    Platelets 02/14/2022 290  150 - 450 K/uL Final    MPV 02/14/2022 11.0  9.2 - 12.9 fL Final    Gran # (ANC) 02/14/2022 6.7  1.8 - 7.7 K/uL Final    Comment: The ANC is based on a white cell differential from an   automated cell counter. It has not been microscopically   reviewed for the presence of abnormal cells. Clinical   correlation is required.      Immature Grans (Abs) 02/14/2022 0.04  0.00 - 0.04 K/uL Final    Comment: Mild elevation in immature granulocytes is non specific and   can be seen in a variety of conditions including stress response,   acute inflammation, trauma and pregnancy. Correlation with other   laboratory and clinical findings is essential.      Sodium 02/14/2022 144  136 - 145 mmol/L Final    Potassium 02/14/2022 4.3  3.5 - 5.1 mmol/L Final    Specimen slightly hemolyzed 02/14/2022  10:49    Chloride 02/14/2022 100  95 - 110 mmol/L Final    CO2 02/14/2022 32* 23 - 29 mmol/L Final    Glucose 02/14/2022 90  70 - 110 mg/dL Final    BUN 02/14/2022 14  8 - 23 mg/dL Final    Creatinine 02/14/2022 1.0  0.5 - 1.4 mg/dL Final    Calcium 02/14/2022 10.3  8.7 - 10.5 mg/dL Final    Total Protein 02/14/2022 7.3  6.0 - 8.4 g/dL Final    Albumin 02/14/2022 3.4* 3.5 - 5.2 g/dL Final    Total Bilirubin 02/14/2022 0.6  0.1 - 1.0 mg/dL Final    Comment: For infants and newborns, interpretation of results should be based  on gestational age, weight and in agreement with clinical  observations.    Premature Infant recommended reference ranges:  Up to 24 hours.............<8.0 mg/dL  Up to 48 hours............<12.0 mg/dL  3-5 days..................<15.0 mg/dL  6-29 days.................<15.0 mg/dL      Alkaline Phosphatase 02/14/2022 95  55 - 135 U/L Final    AST 02/14/2022 23  10 - 40 U/L Final    ALT 02/14/2022 12  10 - 44 U/L Final    Anion Gap 02/14/2022 12  8 - 16 mmol/L Final    eGFR if African American 02/14/2022 >60  >60 mL/min/1.73  "m^2 Final    eGFR if non  02/14/2022 58* >60 mL/min/1.73 m^2 Final    Comment: Calculation used to obtain the estimated glomerular filtration  rate (eGFR) is the CKD-EPI equation.       BNP 02/14/2022 27  0 - 99 pg/mL Final    Values of less than 100 pg/ml are consistent with non-CHF populations.   Hospital Outpatient Visit on 02/09/2022   Component Date Value Ref Range Status    BSA 02/09/2022 2.61  m2 Final    TDI SEPTAL 02/09/2022 0.06  m/s Final    LV LATERAL E/E' RATIO 02/09/2022 17.00  m/s Final    LV SEPTAL E/E' RATIO 02/09/2022 17.00  m/s Final    LA WIDTH 02/09/2022 4.24  cm Final    TDI LATERAL 02/09/2022 0.06  m/s Final    LVIDd 02/09/2022 4.96  3.5 - 6.0 cm Final    IVS 02/09/2022 1.11* 0.6 - 1.1 cm Final    Posterior Wall 02/09/2022 1.00  0.6 - 1.1 cm Final    LVIDs 02/09/2022 3.40  2.1 - 4.0 cm Final    FS 02/09/2022 31  28 - 44 % Final    LA volume 02/09/2022 115.72  cm3 Final    Sinus 02/09/2022 2.93  cm Final    STJ 02/09/2022 2.58  cm Final    Ascending aorta 02/09/2022 3.18  cm Final    LV mass 02/09/2022 193.10  g Final    LA size 02/09/2022 4.98  cm Final    RVDD 02/09/2022 3.84  cm Final    TAPSE 02/09/2022 3.13  cm Final    RV S' 02/09/2022 12.26  cm/s Final    Left Ventricle Relative Wall Thick* 02/09/2022 0.40  cm Final    AV mean gradient 02/09/2022 5  mmHg Final    AV valve area 02/09/2022 2.59  cm2 Final    AV Velocity Ratio 02/09/2022 0.60   Final    AV index (prosthetic) 02/09/2022 0.69   Final    MV valve area p 1/2 method 02/09/2022 2.94  cm2 Final    E/A ratio 02/09/2022 0.84   Final    Mean e' 02/09/2022 0.06  m/s Final    E wave deceleration time 02/09/2022 257.70  msec Final    IVRT 02/09/2022 68.51  msec Final    MV "A" wave duration 02/09/2022 9.99  msec Final    Pulm vein S/D ratio 02/09/2022 1.58   Final    LVOT diameter 02/09/2022 2.18  cm Final    LVOT area 02/09/2022 3.7  cm2 Final    LVOT peak bobo 02/09/2022 0.99  m/s " Final    LVOT peak VTI 02/09/2022 23.44  cm Final    Ao peak pierce 02/09/2022 1.64  m/s Final    Ao VTI 02/09/2022 33.74  cm Final    LVOT stroke volume 02/09/2022 87.45  cm3 Final    AV peak gradient 02/09/2022 11  mmHg Final    E/E' ratio 02/09/2022 17.00  m/s Final    MV Peak E Pierce 02/09/2022 1.02  m/s Final    TR Max Pierce 02/09/2022 3.10  m/s Final    MV stenosis pressure 1/2 time 02/09/2022 74.73  ms Final    MV Peak A Pierce 02/09/2022 1.22  m/s Final    PV Peak S Pierce 02/09/2022 0.71  m/s Final    PV Peak D Pierce 02/09/2022 0.45  m/s Final    LV Systolic Volume 02/09/2022 47.32  mL Final    LV Systolic Volume Index 02/09/2022 19.3  mL/m2 Final    LV Diastolic Volume 02/09/2022 115.79  mL Final    LV Diastolic Volume Index 02/09/2022 47.26  mL/m2 Final    LA Volume Index 02/09/2022 47.2  mL/m2 Final    LV Mass Index 02/09/2022 79  g/m2 Final    RA Major Axis 02/09/2022 5.87  cm Final    Left Atrium Minor Axis 02/09/2022 6.58  cm Final    Left Atrium Major Axis 02/09/2022 6.32  cm Final    Triscuspid Valve Regurgitation Pea* 02/09/2022 38  mmHg Final    LA Volume Index (Mod) 02/09/2022 32.3  mL/m2 Final    LA volume (mod) 02/09/2022 79.21  cm3 Final    RA Width 02/09/2022 4.27  cm Final    Right Atrial Pressure (from IVC) 02/09/2022 3  mmHg Final    EF 02/09/2022 65  % Final    TV rest pulmonary artery pressure 02/09/2022 41  mmHg Final         Patient Active Problem List    Diagnosis Date Noted    Gastric adenocarcinoma 05/25/2021    Chronic diastolic congestive heart failure 01/27/2021    Class 3 severe obesity due to excess calories with serious comorbidity and body mass index (BMI) of 50.0 to 59.9 in adult 04/29/2019    Essential hypertension 04/29/2019    Left knee DJD 12/21/2018    History of DVT (deep vein thrombosis) 10/15/2021    Chronic pain 07/30/2021    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    Abnormal cardiovascular stress test 02/24/2021     Tobacco abuse 01/28/2021    Pure hypercholesterolemia 01/27/2021    Gastroesophageal reflux disease 03/22/2020    Osteopenia of neck of left femur 01/14/2020    Left knee pain 07/19/2019    Hypothyroidism 07/18/2019    Debility     At high risk for injury related to fall     Gait instability 07/15/2019    Major depressive disorder 04/29/2019    Joint pain 04/29/2019    Chronic knee pain 11/30/2018    Obstructive sleep apnea 08/09/2018    Primary osteoarthritis of left knee 07/10/2018     Active Problem List with Overview Notes    Diagnosis Date Noted    Gastric adenocarcinoma 05/25/2021     gastric cancer cT2 or higher      Chronic diastolic congestive heart failure 01/27/2021    Class 3 severe obesity due to excess calories with serious comorbidity and body mass index (BMI) of 50.0 to 59.9 in adult 04/29/2019    Essential hypertension 04/29/2019    Left knee DJD 12/21/2018    History of DVT (deep vein thrombosis) 10/15/2021    Chronic pain 07/30/2021    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    Abnormal cardiovascular stress test 02/24/2021    Tobacco abuse 01/28/2021    Pure hypercholesterolemia 01/27/2021    Gastroesophageal reflux disease 03/22/2020    Osteopenia of neck of left femur 01/14/2020    Left knee pain 07/19/2019    Hypothyroidism 07/18/2019    Debility     At high risk for injury related to fall     Gait instability 07/15/2019    Major depressive disorder 04/29/2019    Joint pain 04/29/2019    Chronic knee pain 11/30/2018    Obstructive sleep apnea 08/09/2018    Primary osteoarthritis of left knee 07/10/2018         Oncology History   Gastric adenocarcinoma   2/5/2021 Procedure    EGD  Feb 2021 EGD- Gastric tumor in the prepyloric region of the stomach,     3/17/2021 Procedure    EUS  Impression:           - Gastric tumor in the prepyloric region of the                         stomach. Biopsied. Lesion appears amenable to                          endoscopic resection (ESD) - Dr. Carballo was present                         to view the lesion.      5/25/2021 Initial Diagnosis    Gastric adenocarcinoma     5/25/2021 Pathology Significant Finding    Adenocarcinoma, moderate to poorly differentiated   Tumor invades deep layers of submuocsa with deep margin extensively positive   Deep margin is extensively positive   Lateral margins are negative for neoplasia or malignancy      5/25/2021 Cancer Staged    Staging form: Stomach, AJCC 8th Edition  - Pathologic stage from 5/25/2021: Stage Unknown (pT1, pNX, cM0)     6/18/2021 Imaging Significant Findings    CT CAP   Asymmetric gastric wall thickening at the level of the gastric antrum presumably representing the patient's gastric adenocarcinoma.  I see no CT evidence for metastatic disease.     Low-density focus lower pole left kidney does not meet criteria for a simple cyst.  Findings can be further evaluated with ultrasound.  Additional subcentimeter low-density foci in the right kidney likely too small to characterize by imaging.     6/28/2021 Tumor Conference       [facility] UGI MULTIDISCIPLINARY TUMOR BOARD  PATIENT REVIEW FORM   ____________________________________________________________    CLINIC #: [id number]  DATE: 6/28/2021    DIAGNOSIS: gastric CA    PRESENTER: Latanya/ Donnie Sanders    PATIENT SUMMARY:   This 69 y/o female had incidental finding of gastric CA on EGD as part of bariatric evaluation given BMI 57. Underwent ESD per Dr. Carballo. Reviewed pathology - pT1b with LVI positive, extensive tumor at deep margin.     BOARD RECOMMENDATIONS:   Recommend perioperative chemotherapy, 4 cycles of FLOT    CONSULT NEEDED:     [] Surgery    [x] Hem/Onc    [] Rad/Onc    [] Dietary                 [] Social Service    [] Psychology       [] AES  [] Radiology     ESD Pathology Stage: Tumor 1b  Node(s) 0 Metastasis 0      GROUP STAGE:  [] O    [] 1A    [] IB    [] IIA    [] IIB     [] IIIA      [] IIIB     [] IIIC    []IV  [] Local recurrence     [] Regional recurrence     [] Distant recurrence   Metastatic site(s): none         [x] Jennifer'l Treatment Guidelines reviewed and care planned is consistent with guidelines.         (i.e., NCCN, NCI, PD, ACO, AUA, etc.)    PRESENTATION AT CANCER CONFERENCE:         [x] Prospective    [] Retrospective     [] Follow-Up       7/21/2021 - 9/30/2021 Chemotherapy    Treatment Summary   Plan Name: OP GASTRIC FLOT - FLUOROURACIL LEUCOVORIN OXALIPLATIN DOCETAXEL Q2W  Treatment Goal: Curative  Status: Inactive  Start Date: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

## 2022-02-15 PROCEDURE — 77386 HC IMRT, COMPLEX: CPT | Mod: HCNC | Performed by: STUDENT IN AN ORGANIZED HEALTH CARE EDUCATION/TRAINING PROGRAM

## 2022-02-15 PROCEDURE — 77014 PR  CT GUIDANCE PLACEMENT RAD THERAPY FIELDS: ICD-10-PCS | Mod: 26,HCNC,, | Performed by: STUDENT IN AN ORGANIZED HEALTH CARE EDUCATION/TRAINING PROGRAM

## 2022-02-15 PROCEDURE — 77014 HC CT GUIDANCE RADIATION THERAPY FLDS PLACEMENT: CPT | Mod: TC,HCNC | Performed by: STUDENT IN AN ORGANIZED HEALTH CARE EDUCATION/TRAINING PROGRAM

## 2022-02-15 PROCEDURE — 77014 PR  CT GUIDANCE PLACEMENT RAD THERAPY FIELDS: CPT | Mod: 26,HCNC,, | Performed by: STUDENT IN AN ORGANIZED HEALTH CARE EDUCATION/TRAINING PROGRAM

## 2022-02-16 PROCEDURE — 77014 PR  CT GUIDANCE PLACEMENT RAD THERAPY FIELDS: ICD-10-PCS | Mod: 26,HCNC,, | Performed by: STUDENT IN AN ORGANIZED HEALTH CARE EDUCATION/TRAINING PROGRAM

## 2022-02-16 PROCEDURE — 77014 PR  CT GUIDANCE PLACEMENT RAD THERAPY FIELDS: CPT | Mod: 26,HCNC,, | Performed by: STUDENT IN AN ORGANIZED HEALTH CARE EDUCATION/TRAINING PROGRAM

## 2022-02-16 PROCEDURE — 77386 HC IMRT, COMPLEX: CPT | Mod: HCNC | Performed by: STUDENT IN AN ORGANIZED HEALTH CARE EDUCATION/TRAINING PROGRAM

## 2022-02-16 PROCEDURE — 77014 HC CT GUIDANCE RADIATION THERAPY FLDS PLACEMENT: CPT | Mod: TC,HCNC | Performed by: STUDENT IN AN ORGANIZED HEALTH CARE EDUCATION/TRAINING PROGRAM

## 2022-02-17 ENCOUNTER — DOCUMENTATION ONLY (OUTPATIENT)
Dept: RADIATION ONCOLOGY | Facility: CLINIC | Age: 72
End: 2022-02-17
Payer: MEDICARE

## 2022-02-17 PROCEDURE — 77014 HC CT GUIDANCE RADIATION THERAPY FLDS PLACEMENT: CPT | Mod: TC,HCNC | Performed by: STUDENT IN AN ORGANIZED HEALTH CARE EDUCATION/TRAINING PROGRAM

## 2022-02-17 PROCEDURE — 77014 PR  CT GUIDANCE PLACEMENT RAD THERAPY FIELDS: ICD-10-PCS | Mod: 26,HCNC,, | Performed by: STUDENT IN AN ORGANIZED HEALTH CARE EDUCATION/TRAINING PROGRAM

## 2022-02-17 PROCEDURE — 77014 PR  CT GUIDANCE PLACEMENT RAD THERAPY FIELDS: CPT | Mod: 26,HCNC,, | Performed by: STUDENT IN AN ORGANIZED HEALTH CARE EDUCATION/TRAINING PROGRAM

## 2022-02-17 PROCEDURE — 77386 HC IMRT, COMPLEX: CPT | Mod: HCNC | Performed by: STUDENT IN AN ORGANIZED HEALTH CARE EDUCATION/TRAINING PROGRAM

## 2022-02-17 RX ORDER — PANTOPRAZOLE SODIUM 40 MG/1
40 TABLET, DELAYED RELEASE ORAL DAILY
Qty: 90 TABLET | Refills: 1 | Status: SHIPPED | OUTPATIENT
Start: 2022-02-17 | End: 2022-07-12

## 2022-02-17 NOTE — TELEPHONE ENCOUNTER
No new care gaps identified.  Powered by [employer]. Reference number: [id number].   2/17/2022 12:04:30 PM CST

## 2022-02-18 ENCOUNTER — INFUSION (OUTPATIENT)
Dept: INFUSION THERAPY | Facility: OTHER | Age: 72
End: 2022-02-18
Attending: INTERNAL MEDICINE
Payer: MEDICARE

## 2022-02-18 VITALS
DIASTOLIC BLOOD PRESSURE: 65 MMHG | OXYGEN SATURATION: 95 % | HEART RATE: 73 BPM | RESPIRATION RATE: 16 BRPM | TEMPERATURE: 98 F | SYSTOLIC BLOOD PRESSURE: 130 MMHG

## 2022-02-18 DIAGNOSIS — C16.9 GASTRIC ADENOCARCINOMA: Primary | ICD-10-CM

## 2022-02-18 PROCEDURE — 77014 PR  CT GUIDANCE PLACEMENT RAD THERAPY FIELDS: CPT | Mod: 26,HCNC,, | Performed by: STUDENT IN AN ORGANIZED HEALTH CARE EDUCATION/TRAINING PROGRAM

## 2022-02-18 PROCEDURE — 63600175 PHARM REV CODE 636 W HCPCS: Mod: HCNC | Performed by: STUDENT IN AN ORGANIZED HEALTH CARE EDUCATION/TRAINING PROGRAM

## 2022-02-18 PROCEDURE — 77014 PR  CT GUIDANCE PLACEMENT RAD THERAPY FIELDS: ICD-10-PCS | Mod: 26,HCNC,, | Performed by: STUDENT IN AN ORGANIZED HEALTH CARE EDUCATION/TRAINING PROGRAM

## 2022-02-18 PROCEDURE — 25000003 PHARM REV CODE 250: Mod: HCNC | Performed by: STUDENT IN AN ORGANIZED HEALTH CARE EDUCATION/TRAINING PROGRAM

## 2022-02-18 PROCEDURE — A4216 STERILE WATER/SALINE, 10 ML: HCPCS | Mod: HCNC | Performed by: STUDENT IN AN ORGANIZED HEALTH CARE EDUCATION/TRAINING PROGRAM

## 2022-02-18 PROCEDURE — 77014 HC CT GUIDANCE RADIATION THERAPY FLDS PLACEMENT: CPT | Mod: TC,HCNC | Performed by: STUDENT IN AN ORGANIZED HEALTH CARE EDUCATION/TRAINING PROGRAM

## 2022-02-18 PROCEDURE — 99211 OFF/OP EST MAY X REQ PHY/QHP: CPT

## 2022-02-18 PROCEDURE — 77386 HC IMRT, COMPLEX: CPT | Mod: HCNC | Performed by: STUDENT IN AN ORGANIZED HEALTH CARE EDUCATION/TRAINING PROGRAM

## 2022-02-18 RX ORDER — HEPARIN 100 UNIT/ML
500 SYRINGE INTRAVENOUS
Status: DISCONTINUED | OUTPATIENT
Start: 2022-02-18 | End: 2022-02-18 | Stop reason: HOSPADM

## 2022-02-18 RX ORDER — SODIUM CHLORIDE 0.9 % (FLUSH) 0.9 %
10 SYRINGE (ML) INJECTION
Status: DISCONTINUED | OUTPATIENT
Start: 2022-02-18 | End: 2022-02-18 | Stop reason: HOSPADM

## 2022-02-18 RX ADMIN — Medication 10 ML: at 12:02

## 2022-02-18 RX ADMIN — HEPARIN 500 UNITS: 100 SYRINGE at 12:02

## 2022-02-18 NOTE — PLAN OF CARE
Home infusion complete. Pt tolerated well. VSS. NAD. Infusion pump DC'd. Port to chest de-accessed after heparinized per protocol.  Pt verbalized understanding of discharge instructions.

## 2022-02-21 ENCOUNTER — OFFICE VISIT (OUTPATIENT)
Dept: HEMATOLOGY/ONCOLOGY | Facility: CLINIC | Age: 72
End: 2022-02-21
Payer: MEDICARE

## 2022-02-21 ENCOUNTER — INFUSION (OUTPATIENT)
Dept: INFUSION THERAPY | Facility: OTHER | Age: 72
End: 2022-02-21
Attending: STUDENT IN AN ORGANIZED HEALTH CARE EDUCATION/TRAINING PROGRAM
Payer: MEDICARE

## 2022-02-21 ENCOUNTER — TELEPHONE (OUTPATIENT)
Dept: SLEEP MEDICINE | Facility: CLINIC | Age: 72
End: 2022-02-21
Payer: MEDICARE

## 2022-02-21 VITALS
OXYGEN SATURATION: 100 % | RESPIRATION RATE: 17 BRPM | SYSTOLIC BLOOD PRESSURE: 132 MMHG | DIASTOLIC BLOOD PRESSURE: 64 MMHG | WEIGHT: 293 LBS | HEART RATE: 77 BPM | HEIGHT: 66 IN | TEMPERATURE: 99 F | BODY MASS INDEX: 47.09 KG/M2

## 2022-02-21 DIAGNOSIS — I10 ESSENTIAL HYPERTENSION: ICD-10-CM

## 2022-02-21 DIAGNOSIS — E66.01 MORBID OBESITY: ICD-10-CM

## 2022-02-21 DIAGNOSIS — I50.32 CHRONIC DIASTOLIC CONGESTIVE HEART FAILURE: ICD-10-CM

## 2022-02-21 DIAGNOSIS — C16.9 GASTRIC ADENOCARCINOMA: Primary | ICD-10-CM

## 2022-02-21 DIAGNOSIS — D50.0 IRON DEFICIENCY ANEMIA SECONDARY TO BLOOD LOSS (CHRONIC): ICD-10-CM

## 2022-02-21 DIAGNOSIS — I82.411 DVT OF DEEP FEMORAL VEIN, RIGHT: ICD-10-CM

## 2022-02-21 PROCEDURE — 99999 PR PBB SHADOW E&M-EST. PATIENT-LVL IV: CPT | Mod: PBBFAC,HCNC,, | Performed by: STUDENT IN AN ORGANIZED HEALTH CARE EDUCATION/TRAINING PROGRAM

## 2022-02-21 PROCEDURE — 77014 PR  CT GUIDANCE PLACEMENT RAD THERAPY FIELDS: ICD-10-PCS | Mod: 26,HCNC,, | Performed by: STUDENT IN AN ORGANIZED HEALTH CARE EDUCATION/TRAINING PROGRAM

## 2022-02-21 PROCEDURE — 1101F PT FALLS ASSESS-DOCD LE1/YR: CPT | Mod: HCNC,CPTII,S$GLB, | Performed by: STUDENT IN AN ORGANIZED HEALTH CARE EDUCATION/TRAINING PROGRAM

## 2022-02-21 PROCEDURE — 99215 PR OFFICE/OUTPT VISIT, EST, LEVL V, 40-54 MIN: ICD-10-PCS | Mod: HCNC,S$GLB,, | Performed by: STUDENT IN AN ORGANIZED HEALTH CARE EDUCATION/TRAINING PROGRAM

## 2022-02-21 PROCEDURE — 1160F RVW MEDS BY RX/DR IN RCRD: CPT | Mod: HCNC,CPTII,S$GLB, | Performed by: STUDENT IN AN ORGANIZED HEALTH CARE EDUCATION/TRAINING PROGRAM

## 2022-02-21 PROCEDURE — 77386 HC IMRT, COMPLEX: CPT | Mod: HCNC | Performed by: STUDENT IN AN ORGANIZED HEALTH CARE EDUCATION/TRAINING PROGRAM

## 2022-02-21 PROCEDURE — 1159F MED LIST DOCD IN RCRD: CPT | Mod: HCNC,CPTII,S$GLB, | Performed by: STUDENT IN AN ORGANIZED HEALTH CARE EDUCATION/TRAINING PROGRAM

## 2022-02-21 PROCEDURE — 96365 THER/PROPH/DIAG IV INF INIT: CPT | Mod: HCNC

## 2022-02-21 PROCEDURE — 63600175 PHARM REV CODE 636 W HCPCS: Mod: HCNC | Performed by: STUDENT IN AN ORGANIZED HEALTH CARE EDUCATION/TRAINING PROGRAM

## 2022-02-21 PROCEDURE — 25000003 PHARM REV CODE 250: Mod: HCNC | Performed by: STUDENT IN AN ORGANIZED HEALTH CARE EDUCATION/TRAINING PROGRAM

## 2022-02-21 PROCEDURE — 96375 TX/PRO/DX INJ NEW DRUG ADDON: CPT | Mod: HCNC

## 2022-02-21 PROCEDURE — 3078F DIAST BP <80 MM HG: CPT | Mod: HCNC,CPTII,S$GLB, | Performed by: STUDENT IN AN ORGANIZED HEALTH CARE EDUCATION/TRAINING PROGRAM

## 2022-02-21 PROCEDURE — 3075F PR MOST RECENT SYSTOLIC BLOOD PRESS GE 130-139MM HG: ICD-10-PCS | Mod: HCNC,CPTII,S$GLB, | Performed by: STUDENT IN AN ORGANIZED HEALTH CARE EDUCATION/TRAINING PROGRAM

## 2022-02-21 PROCEDURE — 1126F PR PAIN SEVERITY QUANTIFIED, NO PAIN PRESENT: ICD-10-PCS | Mod: HCNC,CPTII,S$GLB, | Performed by: STUDENT IN AN ORGANIZED HEALTH CARE EDUCATION/TRAINING PROGRAM

## 2022-02-21 PROCEDURE — 3008F BODY MASS INDEX DOCD: CPT | Mod: HCNC,CPTII,S$GLB, | Performed by: STUDENT IN AN ORGANIZED HEALTH CARE EDUCATION/TRAINING PROGRAM

## 2022-02-21 PROCEDURE — 77336 RADIATION PHYSICS CONSULT: CPT | Mod: HCNC | Performed by: STUDENT IN AN ORGANIZED HEALTH CARE EDUCATION/TRAINING PROGRAM

## 2022-02-21 PROCEDURE — 3008F PR BODY MASS INDEX (BMI) DOCUMENTED: ICD-10-PCS | Mod: HCNC,CPTII,S$GLB, | Performed by: STUDENT IN AN ORGANIZED HEALTH CARE EDUCATION/TRAINING PROGRAM

## 2022-02-21 PROCEDURE — 77014 HC CT GUIDANCE RADIATION THERAPY FLDS PLACEMENT: CPT | Mod: TC,HCNC | Performed by: STUDENT IN AN ORGANIZED HEALTH CARE EDUCATION/TRAINING PROGRAM

## 2022-02-21 PROCEDURE — 3288F FALL RISK ASSESSMENT DOCD: CPT | Mod: HCNC,CPTII,S$GLB, | Performed by: STUDENT IN AN ORGANIZED HEALTH CARE EDUCATION/TRAINING PROGRAM

## 2022-02-21 PROCEDURE — 99499 UNLISTED E&M SERVICE: CPT | Mod: S$GLB,,, | Performed by: STUDENT IN AN ORGANIZED HEALTH CARE EDUCATION/TRAINING PROGRAM

## 2022-02-21 PROCEDURE — 1159F PR MEDICATION LIST DOCUMENTED IN MEDICAL RECORD: ICD-10-PCS | Mod: HCNC,CPTII,S$GLB, | Performed by: STUDENT IN AN ORGANIZED HEALTH CARE EDUCATION/TRAINING PROGRAM

## 2022-02-21 PROCEDURE — 1126F AMNT PAIN NOTED NONE PRSNT: CPT | Mod: HCNC,CPTII,S$GLB, | Performed by: STUDENT IN AN ORGANIZED HEALTH CARE EDUCATION/TRAINING PROGRAM

## 2022-02-21 PROCEDURE — 96416 CHEMO PROLONG INFUSE W/PUMP: CPT | Mod: HCNC

## 2022-02-21 PROCEDURE — 1160F PR REVIEW ALL MEDS BY PRESCRIBER/CLIN PHARMACIST DOCUMENTED: ICD-10-PCS | Mod: HCNC,CPTII,S$GLB, | Performed by: STUDENT IN AN ORGANIZED HEALTH CARE EDUCATION/TRAINING PROGRAM

## 2022-02-21 PROCEDURE — 3288F PR FALLS RISK ASSESSMENT DOCUMENTED: ICD-10-PCS | Mod: HCNC,CPTII,S$GLB, | Performed by: STUDENT IN AN ORGANIZED HEALTH CARE EDUCATION/TRAINING PROGRAM

## 2022-02-21 PROCEDURE — 3078F PR MOST RECENT DIASTOLIC BLOOD PRESSURE < 80 MM HG: ICD-10-PCS | Mod: HCNC,CPTII,S$GLB, | Performed by: STUDENT IN AN ORGANIZED HEALTH CARE EDUCATION/TRAINING PROGRAM

## 2022-02-21 PROCEDURE — 77014 PR  CT GUIDANCE PLACEMENT RAD THERAPY FIELDS: CPT | Mod: 26,HCNC,, | Performed by: STUDENT IN AN ORGANIZED HEALTH CARE EDUCATION/TRAINING PROGRAM

## 2022-02-21 PROCEDURE — 1101F PR PT FALLS ASSESS DOC 0-1 FALLS W/OUT INJ PAST YR: ICD-10-PCS | Mod: HCNC,CPTII,S$GLB, | Performed by: STUDENT IN AN ORGANIZED HEALTH CARE EDUCATION/TRAINING PROGRAM

## 2022-02-21 PROCEDURE — 99215 OFFICE O/P EST HI 40 MIN: CPT | Mod: HCNC,S$GLB,, | Performed by: STUDENT IN AN ORGANIZED HEALTH CARE EDUCATION/TRAINING PROGRAM

## 2022-02-21 PROCEDURE — 99999 PR PBB SHADOW E&M-EST. PATIENT-LVL IV: ICD-10-PCS | Mod: PBBFAC,HCNC,, | Performed by: STUDENT IN AN ORGANIZED HEALTH CARE EDUCATION/TRAINING PROGRAM

## 2022-02-21 PROCEDURE — 3075F SYST BP GE 130 - 139MM HG: CPT | Mod: HCNC,CPTII,S$GLB, | Performed by: STUDENT IN AN ORGANIZED HEALTH CARE EDUCATION/TRAINING PROGRAM

## 2022-02-21 PROCEDURE — 99499 RISK ADDL DX/OHS AUDIT: ICD-10-PCS | Mod: S$GLB,,, | Performed by: STUDENT IN AN ORGANIZED HEALTH CARE EDUCATION/TRAINING PROGRAM

## 2022-02-21 RX ORDER — SODIUM CHLORIDE 9 MG/ML
INJECTION, SOLUTION INTRAVENOUS CONTINUOUS
Status: DISCONTINUED | OUTPATIENT
Start: 2022-02-21 | End: 2022-02-21 | Stop reason: HOSPADM

## 2022-02-21 RX ORDER — HEPARIN 100 UNIT/ML
5 SYRINGE INTRAVENOUS
Status: CANCELLED | OUTPATIENT
Start: 2022-02-21

## 2022-02-21 RX ORDER — SODIUM CHLORIDE 0.9 % (FLUSH) 0.9 %
10 SYRINGE (ML) INJECTION
Status: DISCONTINUED | OUTPATIENT
Start: 2022-02-21 | End: 2022-02-21 | Stop reason: HOSPADM

## 2022-02-21 RX ORDER — ONDANSETRON 2 MG/ML
8 INJECTION INTRAMUSCULAR; INTRAVENOUS
Status: CANCELLED | OUTPATIENT
Start: 2022-02-21

## 2022-02-21 RX ORDER — METHYLPREDNISOLONE SOD SUCC 125 MG
125 VIAL (EA) INJECTION ONCE AS NEEDED
Status: CANCELLED | OUTPATIENT
Start: 2022-02-21

## 2022-02-21 RX ORDER — EPINEPHRINE 0.3 MG/.3ML
0.3 INJECTION SUBCUTANEOUS ONCE AS NEEDED
Status: CANCELLED | OUTPATIENT
Start: 2022-02-21

## 2022-02-21 RX ORDER — SODIUM CHLORIDE 0.9 % (FLUSH) 0.9 %
10 SYRINGE (ML) INJECTION
Status: CANCELLED | OUTPATIENT
Start: 2022-02-25

## 2022-02-21 RX ORDER — DIPHENHYDRAMINE HYDROCHLORIDE 50 MG/ML
50 INJECTION INTRAMUSCULAR; INTRAVENOUS ONCE AS NEEDED
Status: DISCONTINUED | OUTPATIENT
Start: 2022-02-21 | End: 2022-02-21 | Stop reason: HOSPADM

## 2022-02-21 RX ORDER — SODIUM CHLORIDE 0.9 % (FLUSH) 0.9 %
10 SYRINGE (ML) INJECTION
Status: DISCONTINUED | OUTPATIENT
Start: 2022-02-21 | End: 2022-02-21 | Stop reason: SDUPTHER

## 2022-02-21 RX ORDER — ONDANSETRON 2 MG/ML
8 INJECTION INTRAMUSCULAR; INTRAVENOUS
Status: COMPLETED | OUTPATIENT
Start: 2022-02-21 | End: 2022-02-21

## 2022-02-21 RX ORDER — HEPARIN 100 UNIT/ML
500 SYRINGE INTRAVENOUS
Status: DISCONTINUED | OUTPATIENT
Start: 2022-02-21 | End: 2022-02-21 | Stop reason: HOSPADM

## 2022-02-21 RX ORDER — HEPARIN 100 UNIT/ML
500 SYRINGE INTRAVENOUS
Status: CANCELLED | OUTPATIENT
Start: 2022-02-21

## 2022-02-21 RX ORDER — DIPHENHYDRAMINE HYDROCHLORIDE 50 MG/ML
50 INJECTION INTRAMUSCULAR; INTRAVENOUS ONCE AS NEEDED
Status: CANCELLED | OUTPATIENT
Start: 2022-02-21

## 2022-02-21 RX ORDER — EPINEPHRINE 0.3 MG/.3ML
0.3 INJECTION SUBCUTANEOUS ONCE AS NEEDED
Status: DISCONTINUED | OUTPATIENT
Start: 2022-02-21 | End: 2022-02-21 | Stop reason: HOSPADM

## 2022-02-21 RX ORDER — SODIUM CHLORIDE 0.9 % (FLUSH) 0.9 %
10 SYRINGE (ML) INJECTION
Status: CANCELLED | OUTPATIENT
Start: 2022-02-21

## 2022-02-21 RX ORDER — HEPARIN 100 UNIT/ML
5 SYRINGE INTRAVENOUS
Status: DISCONTINUED | OUTPATIENT
Start: 2022-02-21 | End: 2022-02-21 | Stop reason: HOSPADM

## 2022-02-21 RX ORDER — SODIUM CHLORIDE 9 MG/ML
INJECTION, SOLUTION INTRAVENOUS CONTINUOUS
Status: CANCELLED | OUTPATIENT
Start: 2022-02-21

## 2022-02-21 RX ORDER — HEPARIN 100 UNIT/ML
500 SYRINGE INTRAVENOUS
Status: CANCELLED | OUTPATIENT
Start: 2022-02-25

## 2022-02-21 RX ADMIN — SODIUM CHLORIDE: 0.9 INJECTION, SOLUTION INTRAVENOUS at 11:02

## 2022-02-21 RX ADMIN — FERRIC CARBOXYMALTOSE INJECTION 750 MG: 50 INJECTION, SOLUTION INTRAVENOUS at 11:02

## 2022-02-21 RX ADMIN — FLUOROURACIL 2350 MG: 50 INJECTION, SOLUTION INTRAVENOUS at 12:02

## 2022-02-21 RX ADMIN — ONDANSETRON 8 MG: 2 INJECTION INTRAMUSCULAR; INTRAVENOUS at 12:02

## 2022-02-21 NOTE — PLAN OF CARE
Injectafer infusion complete. Pt tolerated well. VSS. NAD. Home Chemo infusion pump started. Pt to return 2/25/22 @ 12:30pm for DC of pump.  Pt verbalized understanding of discharge instructions before leaving.

## 2022-02-21 NOTE — PROGRESS NOTES
Hematology- Oncology Clinic Note :      2/21/2022    RFV / chief complaint- Follow-up and Gastric adenocarcinoma        HPI  Pt is a 71 y.o. 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   Pt presents to the clinic today for gastric cancer    Doing well. Tolerating chemo and radiation well. Mild nausea resolved with anti emetics.   No bleeding reported.  Tolerating Eliquis without any issues.    Pt was being evaluated for gastric bypass surgery   Feb 2021 EGD- Gastric tumor in the prepyloric region of the stomach,  March 2021 EUS- Gastric tumor in the prepyloric region of the stomach.  Both above biopsies did not show malignancy  5/25/21 EGD- Gastric tumor on the posterior wall of the gastric antrum. Complete removal was accomplished.         Reviewed past medical/surgical/social history    Past Medical History:   Diagnosis Date    YOUNG (acute kidney injury) 10/15/2021    Anemia     2018    Arthritis     BMI 60.0-69.9, adult     Cataract     Chronic diastolic congestive heart failure 1/27/2021    Depression     Dry eye syndrome     Gastric adenocarcinoma 5/25/2021    GERD (gastroesophageal reflux disease)     Glaucoma suspect     History of gastric ulcer     Hyperlipidemia     Hypertension     Hypothyroidism     Morbid obesity     Neuromuscular disorder     Sleep apnea     Thyroid disease       Past Surgical History:   Procedure Laterality Date    APPENDECTOMY      CARDIAC SURGERY      CATARACT EXTRACTION W/  INTRAOCULAR LENS IMPLANT Bilateral     MFIOL OU    CORONARY ANGIOGRAPHY  Bilateral 2/24/2021    Procedure: ANGIOGRAM, CORONARY ARTERY;  Surgeon: Cresencio Ridley MD;  Location: [facility] CATH LAB;  Service: Cardiology;  Laterality: Bilateral;  Covid test needed - ok per Danay MORENOU. Pt. w/o transportation or family    ENDOSCOPIC ULTRASOUND OF UPPER GASTROINTESTINAL TRACT N/A 3/17/2021    Procedure: ULTRASOUND, UPPER GI TRACT, ENDOSCOPIC;  Surgeon: Gerald Anaya MD;  Location: [facility] ENDO (2ND FLR);  Service: Endoscopy;  Laterality: N/A;  Pt unable to get a ride for swab. Will do rapid test at 8:30 - ttr    ENDOSCOPIC ULTRASOUND OF UPPER GASTROINTESTINAL TRACT N/A 1/13/2022    Procedure: ULTRASOUND, UPPER GI TRACT, ENDOSCOPIC;  Surgeon: Kevin Carballo MD;  Location: [facility] ENDO (2ND FLR);  Service: Endoscopy;  Laterality: N/A;  blood thinner approval received, see telephone encounter 1/7/22-BB  rapid  instructions given verbally and sent to address on file in pt's chart-BB    ESOPHAGOGASTRODUODENOSCOPY N/A 2/5/2021    Procedure: EGD (ESOPHAGOGASTRODUODENOSCOPY);  Surgeon: Matthew Hernadez MD;  Location: [facility] ENDO (2ND FLR);  Service: Endoscopy;  Laterality: N/A;  BMI-58    Wt: 361#     COVID test at [facility] on 2/2-GT    ESOPHAGOGASTRODUODENOSCOPY N/A 3/17/2021    Procedure: EGD (ESOPHAGOGASTRODUODENOSCOPY);  Surgeon: Gerald Anaya MD;  Location: [facility] ENDO (2ND FLR);  Service: Endoscopy;  Laterality: N/A;    ESOPHAGOGASTRODUODENOSCOPY N/A 5/25/2021    Procedure: EGD (ESOPHAGOGASTRODUODENOSCOPY);  Surgeon: Kevin Carballo MD;  Location: [facility] ENDO (2ND FLR);  Service: Endoscopy;  Laterality: N/A;  ESD 2 hours  Full COVID vaccination on file. ttr    ESOPHAGOGASTRODUODENOSCOPY N/A 1/13/2022    Procedure: EGD (ESOPHAGOGASTRODUODENOSCOPY);  Surgeon: Kevin Carballo MD;  Location: [facility] ENDO (2ND FLR);  Service: Endoscopy;  Laterality: N/A;  blood thinner approval, see telephone encounter 1/7/22-BB  rapid  instructions given verbally and sent to address on file in pt's chart-BB    EYE  SURGERY      INJECTION OF ANESTHETIC AGENT AROUND NERVE Left 7/10/2018    Procedure: BLOCK, NERVE;  Surgeon: Samantha Vidal MD;  Location: [facility] PAIN MGT;  Service: Pain Management;  Laterality: Left;  Genicular     INJECTION OF ANESTHETIC AGENT AROUND NERVE Left 2019    Procedure: Block, Nerve NERVE BLOCK GENICULAR WITH PHENOL 6%;  Surgeon: Samantha Vidal MD;  Location: [facility] PAIN MGT;  Service: Pain Management;  Laterality: Left;  NEEDS CONSENT    INSERTION OF TUNNELED CENTRAL VENOUS CATHETER (CVC) WITH SUBCUTANEOUS PORT N/A 2021    Procedure: INSERTION, PORT-A-CATH;  Surgeon: Mario Paz MD;  Location: [facility] CATH LAB;  Service: Radiology;  Laterality: N/A;  PORT PLACEMENT    RADIOFREQUENCY ABLATION Left 2020    Procedure: RADIOFREQUENCY ABLATION LEFT GENICULAR;  Surgeon: Tevin Clark MD;  Location: [facility] PAIN MGT;  Service: Pain Management;  Laterality: Left;  Left Genicular RFA    RADIOFREQUENCY ABLATION Left 2021    Procedure: RADIOFREQUENCY ABLATION LEFT GENICULAR;  Surgeon: Tevin Clark MD;  Location: [facility] PAIN MGT;  Service: Pain Management;  Laterality: Left;    RADIOFREQUENCY ABLATION Left 2021    Procedure: RADIOFREQUENCY ABLATION, GENICULAR COOLED NEED CONSENT;  Surgeon: Tevin Clark MD;  Location: [facility] PAIN MGT;  Service: Pain Management;  Laterality: Left;    TONSILLECTOMY        (Not in a hospital admission)    Review of patient's allergies indicates:  No Known Allergies   Social History     Tobacco Use    Smoking status: Current Some Day Smoker     Packs/day: 0.25     Years: 50.00     Pack years: 12.50     Types: Cigarettes     Last attempt to quit: 2020     Years since quittin.1    Smokeless tobacco: Never Used   Substance Use Topics    Alcohol use: Yes     Comment: raely      Family History   Problem Relation Age of Onset    No Known Problems Mother     No Known Problems Father     Colon cancer Neg Hx     Esophageal cancer Neg Hx      "      Review of Systems :  Review of Systems   Constitutional: Positive for malaise/fatigue. Negative for chills, diaphoresis, fever and weight loss.   HENT: Negative.  Negative for congestion, hearing loss, nosebleeds, sore throat and tinnitus.    Eyes: Negative.  Negative for blurred vision and discharge.   Respiratory: Negative for cough, hemoptysis, sputum production, shortness of breath and wheezing.    Cardiovascular: Negative.  Negative for chest pain, palpitations and leg swelling.   Gastrointestinal: Positive for nausea. Negative for abdominal pain, blood in stool, constipation, diarrhea, heartburn, melena and vomiting.   Genitourinary: Negative.    Musculoskeletal: Positive for joint pain. Negative for back pain, falls and myalgias.   Skin: Negative for itching and rash.   Neurological: Negative for dizziness, tingling, sensory change, speech change, focal weakness, seizures, loss of consciousness, weakness and headaches.   Endo/Heme/Allergies: Negative.  Does not bruise/bleed easily.   Psychiatric/Behavioral: Negative.  Negative for depression. The patient is not nervous/anxious and does not have insomnia.                Physical Exam :  /64 (BP Location: Left arm, Patient Position: Sitting, BP Method: Medium (Automatic))   Pulse 77   Temp 98.5 °F (36.9 °C) (Oral)   Resp 17   Ht 5' 6" (1.676 m)   Wt (!) 136.1 kg (300 lb 0.7 oz)   SpO2 100%   BMI 48.43 kg/m²   Wt Readings from Last 3 Encounters:   02/21/22 (!) 136.1 kg (300 lb 0.7 oz)   02/14/22 (!) 144.1 kg (317 lb 10.9 oz)   02/09/22 (!) 146.1 kg (322 lb)       Body mass index is 48.43 kg/m².      Physical Exam  Vitals and nursing note reviewed.   Constitutional:       General: She is not in acute distress.     Appearance: She is well-developed.   HENT:      Head: Normocephalic and atraumatic.      Right Ear: External ear normal.      Left Ear: External ear normal.      Mouth/Throat:      Pharynx: No oropharyngeal exudate.   Eyes:      " General: No scleral icterus.     Conjunctiva/sclera: Conjunctivae normal.   Neck:      Thyroid: No thyromegaly.      Trachea: No tracheal deviation.   Cardiovascular:      Rate and Rhythm: Normal rate and regular rhythm.      Heart sounds: Normal heart sounds. No murmur heard.  Pulmonary:      Effort: Pulmonary effort is normal. No respiratory distress.      Breath sounds: Normal breath sounds. No wheezing or rales.      Comments: Port SOI  Chest:   Breasts:      Right: No swelling, nipple discharge, skin change or tenderness.       Abdominal:      General: Bowel sounds are normal. There is no distension (obese).      Palpations: Abdomen is soft. There is no mass.      Tenderness: There is no abdominal tenderness. There is no rebound.   Musculoskeletal:         General: Swelling present. No tenderness. Normal range of motion.      Cervical back: Normal range of motion and neck supple.      Right lower leg: Edema present.      Left lower leg: Edema present.   Lymphadenopathy:      Cervical: No cervical adenopathy.   Skin:     General: Skin is warm.      Findings: Wound present. No erythema or rash.   Neurological:      Mental Status: She is alert and oriented to person, place, and time.      Cranial Nerves: No cranial nerve deficit.      Gait: Gait abnormal (uses cane).   Psychiatric:         Behavior: Behavior normal.             Current Outpatient Medications   Medication Sig Dispense Refill    acetaminophen (TYLENOL) 500 MG tablet       amLODIPine (NORVASC) 10 MG tablet Take 1 tablet (10 mg total) by mouth once daily. 90 tablet 11    atorvastatin (LIPITOR) 20 MG tablet TAKE 1 TABLET ONE TIME DAILY 90 tablet 1    B-complex with vitamin C (Z-BEC OR EQUIV) tablet Take 1 tablet by mouth once daily.       CALCIUM CITRATE-VITAMIN D2 ORAL Take 1 tablet by mouth once daily.       citalopram (CELEXA) 10 MG tablet Take 1 tablet (10 mg total) by mouth once daily. 90 tablet 3    ELIQUIS 5 mg Tab TAKE 1 TABLET TWICE  DAILY 180 tablet 2    ferrous gluconate (FERGON) 324 MG tablet Take 1 tablet (324 mg total) by mouth daily with breakfast. 90 tablet 11    furosemide (LASIX) 80 MG tablet Take 1 tablet (80 mg total) by mouth 2 (two) times a day. 60 tablet 11    gabapentin (NEURONTIN) 300 MG capsule Take 2 capsules (600 mg total) by mouth 3 (three) times daily. 540 capsule 2    LIDOcaine-prilocaine (EMLA) cream Apply topically as needed (prior to port access). 30 g 0    lisinopriL (PRINIVIL,ZESTRIL) 40 MG tablet Take 1 tablet (40 mg total) by mouth once daily. 90 tablet 11    multivitamin with minerals tablet Take 1 tablet by mouth once daily.       oxyCODONE-acetaminophen (PERCOCET) 5-325 mg per tablet Take 1 tablet by mouth every 8 (eight) hours as needed for Pain. 90 tablet 0    pantoprazole (PROTONIX) 40 MG tablet Take 1 tablet (40 mg total) by mouth Daily. 90 tablet 1    semaglutide (OZEMPIC) 1 mg/dose (2 mg/1.5 mL) PnIj Inject 1 mg into the skin every 7 days. 2 pen 3     No current facility-administered medications for this visit.     Facility-Administered Medications Ordered in Other Visits   Medication Dose Route Frequency Provider Last Rate Last Admin    0.9%  NaCl infusion   Intravenous Continuous Tevin Clark MD   Stopped at 01/13/22 1055    0.9%  NaCl infusion   Intravenous Continuous Cathy Pelaez MD   Stopped at 02/21/22 1220    alteplase injection 2 mg  2 mg Intra-Catheter PRN Cathy Pelaez MD        diphenhydrAMINE injection 50 mg  50 mg Intravenous Once PRN Cathy Pelaez MD        EPINEPHrine (EPIPEN) 0.3 mg/0.3 mL pen injection 0.3 mg  0.3 mg Intramuscular Once PRN Cathy Pelaez MD        fluorouraciL 225 mg/m2/day = 2,350 mg in sodium chloride 0.9% 240 mL chemo infusion  225 mg/m2/day (Treatment Plan Recorded) Intravenous over 96 hr Cathy Pelaez MD   2,350 mg at 02/21/22 1220    heparin, porcine (PF) 100 unit/mL injection flush 500 Units  500 Units Intravenous PRN Cathy Pelaez MD         heparin, porcine (PF) 100 unit/mL injection flush 500 Units  5 mL Intravenous PRN Cathy Pelaez MD        hydrocortisone sodium succinate injection 100 mg  100 mg Intravenous Once PRN Cathy Pelaez MD        sodium chloride 0.9% 100 mL flush bag   Intravenous PRN Cathy Pelaez MD        sodium chloride 0.9% 250 mL flush bag   Intravenous 1 time in Clinic/HOD Cathy Pelaez MD        sodium chloride 0.9% flush 10 mL  10 mL Intravenous PRN Cathy Pelaez MD           Pertinent Diagnostic studies:      Lab Visit on 02/21/2022   Component Date Value Ref Range Status    WBC 02/21/2022 7.32  3.90 - 12.70 K/uL Final    RBC 02/21/2022 3.79 (A) 4.00 - 5.40 M/uL Final    Hemoglobin 02/21/2022 12.2  12.0 - 16.0 g/dL Final    Hematocrit 02/21/2022 39.2  37.0 - 48.5 % Final    MCV 02/21/2022 103 (A) 82 - 98 fL Final    MCH 02/21/2022 32.2 (A) 27.0 - 31.0 pg Final    MCHC 02/21/2022 31.1 (A) 32.0 - 36.0 g/dL Final    RDW 02/21/2022 13.4  11.5 - 14.5 % Final    Platelets 02/21/2022 244  150 - 450 K/uL Final    MPV 02/21/2022 10.6  9.2 - 12.9 fL Final    Immature Granulocytes 02/21/2022 0.5  0.0 - 0.5 % Final    Gran # (ANC) 02/21/2022 5.7  1.8 - 7.7 K/uL Final    Immature Grans (Abs) 02/21/2022 0.04  0.00 - 0.04 K/uL Final    Comment: Mild elevation in immature granulocytes is non specific and   can be seen in a variety of conditions including stress response,   acute inflammation, trauma and pregnancy. Correlation with other   laboratory and clinical findings is essential.      Lymph # 02/21/2022 0.9 (A) 1.0 - 4.8 K/uL Final    Mono # 02/21/2022 0.6  0.3 - 1.0 K/uL Final    Eos # 02/21/2022 0.0  0.0 - 0.5 K/uL Final    Baso # 02/21/2022 0.02  0.00 - 0.20 K/uL Final    nRBC 02/21/2022 0  0 /100 WBC Final    Gran % 02/21/2022 77.8 (A) 38.0 - 73.0 % Final    Lymph % 02/21/2022 12.3 (A) 18.0 - 48.0 % Final    Mono % 02/21/2022 8.6  4.0 - 15.0 % Final    Eosinophil % 02/21/2022 0.5  0.0 - 8.0 % Final     Basophil % 02/21/2022 0.3  0.0 - 1.9 % Final    Differential Method 02/21/2022 Automated   Final    Sodium 02/21/2022 141  136 - 145 mmol/L Final    Potassium 02/21/2022 4.1  3.5 - 5.1 mmol/L Final    Chloride 02/21/2022 108  95 - 110 mmol/L Final    CO2 02/21/2022 23  23 - 29 mmol/L Final    Glucose 02/21/2022 103  70 - 110 mg/dL Final    BUN 02/21/2022 10  8 - 23 mg/dL Final    Creatinine 02/21/2022 0.7  0.5 - 1.4 mg/dL Final    Calcium 02/21/2022 9.4  8.7 - 10.5 mg/dL Final    Total Protein 02/21/2022 6.8  6.0 - 8.4 g/dL Final    Albumin 02/21/2022 3.3 (A) 3.5 - 5.2 g/dL Final    Total Bilirubin 02/21/2022 0.5  0.1 - 1.0 mg/dL Final    Comment: For infants and newborns, interpretation of results should be based  on gestational age, weight and in agreement with clinical  observations.    Premature Infant recommended reference ranges:  Up to 24 hours.............<8.0 mg/dL  Up to 48 hours............<12.0 mg/dL  3-5 days..................<15.0 mg/dL  6-29 days.................<15.0 mg/dL      Alkaline Phosphatase 02/21/2022 89  55 - 135 U/L Final    AST 02/21/2022 16  10 - 40 U/L Final    ALT 02/21/2022 12  10 - 44 U/L Final    Anion Gap 02/21/2022 10  8 - 16 mmol/L Final    eGFR if African American 02/21/2022 >60  >60 mL/min/1.73 m^2 Final    eGFR if non African American 02/21/2022 >60  >60 mL/min/1.73 m^2 Final    Comment: Calculation used to obtain the estimated glomerular filtration  rate (eGFR) is the CKD-EPI equation.      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    Abnormal cardiovascular stress test 02/24/2021    Tobacco abuse 01/28/2021    Pure hypercholesterolemia 01/27/2021    Gastroesophageal reflux disease 03/22/2020    Osteopenia of neck of left femur 01/14/2020    Left knee pain 07/19/2019    Hypothyroidism 07/18/2019    Debility     At high risk for injury related to fall     Gait instability 07/15/2019    Major depressive disorder 04/29/2019    Joint pain 04/29/2019    Chronic knee pain 11/30/2018    Obstructive sleep apnea 08/09/2018    Primary osteoarthritis of left knee 07/10/2018     Active Problem List with Overview Notes    Diagnosis Date Noted    Gastric adenocarcinoma 05/25/2021     gastric cancer cT2 or higher      Chronic diastolic congestive heart failure 01/27/2021    Class 3 severe obesity due to excess calories with serious comorbidity and body mass index (BMI) of 50.0 to 59.9 in adult 04/29/2019    Essential hypertension 04/29/2019    Left knee DJD 12/21/2018    History of DVT (deep vein thrombosis) 10/15/2021    Chronic pain 07/30/2021    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    Abnormal cardiovascular stress test 02/24/2021    Tobacco abuse 01/28/2021    Pure hypercholesterolemia 01/27/2021    Gastroesophageal reflux disease 03/22/2020    Osteopenia of neck of left femur 01/14/2020    Left knee pain 07/19/2019    Hypothyroidism 07/18/2019    Debility     At high risk for injury related to fall     Gait instability 07/15/2019    Major depressive disorder 04/29/2019    Joint pain 04/29/2019    Chronic knee pain 11/30/2018    Obstructive sleep apnea 08/09/2018    Primary osteoarthritis of left knee 07/10/2018         Oncology History   Gastric adenocarcinoma   2/5/2021 Procedure    EGD  Feb 2021 EGD- Gastric tumor in the prepyloric region of the stomach,     3/17/2021 Procedure    EUS  Impression:            - Gastric tumor in the prepyloric region of the                         stomach. Biopsied. Lesion appears amenable to                         endoscopic resection (ESD) - Dr. Carballo was present                         to view the lesion.      5/25/2021 Initial Diagnosis    Gastric adenocarcinoma     5/25/2021 Pathology Significant Finding    Adenocarcinoma, moderate to poorly differentiated   Tumor invades deep layers of submuocsa with deep margin extensively positive   Deep margin is extensively positive   Lateral margins are negative for neoplasia or malignancy      5/25/2021 Cancer Staged    Staging form: Stomach, AJCC 8th Edition  - Pathologic stage from 5/25/2021: Stage Unknown (pT1, pNX, cM0)     6/18/2021 Imaging Significant Findings    CT CAP   Asymmetric gastric wall thickening at the level of the gastric antrum presumably representing the patient's gastric adenocarcinoma.  I see no CT evidence for metastatic disease.     Low-density focus lower pole left kidney does not meet criteria for a simple cyst.  Findings can be further evaluated with ultrasound.  Additional subcentimeter low-density foci in the right kidney likely too small to characterize by imaging.     6/28/2021 Tumor Conference       [facility] UGI MULTIDISCIPLINARY TUMOR BOARD  PATIENT REVIEW FORM   ____________________________________________________________    CLINIC #: [id number]  DATE: 6/28/2021    DIAGNOSIS: gastric CA    PRESENTER: Latanya/ Donnie Sanders    PATIENT SUMMARY:   This 71 y/o female had incidental finding of gastric CA on EGD as part of bariatric evaluation given BMI 57. Underwent ESD per Dr. Carballo. Reviewed pathology - pT1b with LVI positive, extensive tumor at deep margin.     BOARD RECOMMENDATIONS:   Recommend perioperative chemotherapy, 4 cycles of FLOT    CONSULT NEEDED:     [] Surgery    [x] Hem/Onc    [] Rad/Onc    [] Dietary                 [] Social Service    [] Psychology       [] AES  []  Radiology     ESD Pathology Stage: Tumor 1b  Node(s) 0 Metastasis 0      GROUP STAGE:  [] O    [] 1A    [] IB    [] IIA    [] IIB     [] IIIA     [] IIIB     [] IIIC    []IV  [] Local recurrence     [] Regional recurrence     [] Distant recurrence   Metastatic site(s): none         [x] Jennifer'l Treatment Guidelines reviewed and care planned is consistent with guidelines.         (i.e., NCCN, NCI, PD, ACO, AUA, etc.)    PRESENTATION AT CANCER CONFERENCE:         [x] Prospective    [] Retrospective     [] Follow-Up       7/21/2021 - 9/30/2021 Chemotherapy    Treatment Summary   Plan Name: OP GASTRIC FLOT - FLUOROURACIL LEUCOVORIN OXALIPLATIN DOCETAXEL Q2W  Treatment Goal: Curative  Status: Inactive  Start Date: 7/21/2021  End Date: 9/30/2021  Provider: Cathy Pelaez MD  Chemotherapy: DOCEtaxeL (TAXOTERE) 50 mg/m2 = 135 mg in sodium chloride 0.9% 250 mL chemo infusion, 50 mg/m2 = 135 mg, Intravenous, Clinic/HOD 1 time, 4 of 4 cycles  Dose modification: 40 mg/m2 (original dose 50 mg/m2, Cycle 3)  Administration: 135 mg (7/21/2021), 135 mg (8/12/2021), 105 mg (9/15/2021), 105 mg (9/29/2021)  leucovorin calcium 200 mg/m2 = 545 mg in dextrose 5 % 250 mL infusion, 200 mg/m2 = 545 mg, Intravenous, Clinic/HOD 1 time, 4 of 4 cycles  Administration: 545 mg (7/21/2021), 535 mg (8/12/2021), 530 mg (9/15/2021), 525 mg (9/29/2021)  oxaliplatin (ELOXATIN) 85 mg/m2 = 232 mg in dextrose 5 % 500 mL chemo infusion, 85 mg/m2 = 232 mg, Intravenous, Clinic/HOD 1 time, 4 of 4 cycles  Administration: 232 mg (7/21/2021), 228 mg (8/12/2021), 225 mg (9/15/2021), 223 mg (9/29/2021)  fluorouraciL 7,000 mg in sodium chloride 0.9% 240 mL chemo infusion, 7,100 mg, Intravenous, Over 24 hours, 4 of 11 cycles  Dose modification: 2,000 mg/m2 (original dose 2,600 mg/m2, Cycle 3), 225 mg/m2/day (original dose 2,600 mg/m2, Cycle 5)  Administration: 7,000 mg (7/21/2021), 6,970 mg (8/12/2021), 5,300 mg (9/15/2021), 5,000 mg (9/29/2021)     9/16/2021 Imaging  Significant Findings    Thrombosis of the right popliteal, posterior tibial, and peroneal veins.     11/15/2021 Imaging Significant Findings    Overall, no detrimental change compared to previous.  Persistent eccentric wall thickening at the level of the antrum in this patient with provided history of gastric adenocarcinoma.  No metastatic disease.     Subcentimeter pulmonary nodules unchanged.  No new nodules.     Cholelithiasis     1/13/2022 Procedure    EUS  Impression:            - Normal esophagus.                          - Scar in the gastric antrum. Biopsied.                          - Congested, nodular and ulcerated mucosa in the                          antrum. Biopsied. Treated with argon plasma                          coagulation (APC).                          - Acquired deformity in the prepyloric region of                          the stomach.                          - Normal examined duodenum.                          - There was abnormal echogenicity in the                          visualized portion of the liver. This was                          hyperechoic and homogenous. This can be seen with                          fatty liver.                          - Hyperechoic material consistent with sludge was                          visualized endosonographically in the gallbladder.                          - There was dilation in the common bile duct which                          measured up to 10.8 mm.                          - Wall thickening was seen in the antrum of the                          stomach. This probable related to previous ESD.      2/14/2022 -  Chemotherapy    Treatment Summary   Plan Name: OP FLUOROURACIL (5 DAY) QW + XRT  Treatment Goal: Curative  Status: Active  Start Date: 2/14/2022  End Date: 3/18/2022 (Planned)  Provider: Cathy Pelaez MD  Chemotherapy: [No matching medication found in this treatment plan]       Assessment :       1. Gastric adenocarcinoma    2. DVT of  deep femoral vein, right    3. Chronic diastolic congestive heart failure    4. Morbid obesity    5. Essential hypertension        Plan :           Gastric adenocarcinoma pT1b atleast , clinically T2   Found on work up for gastric bypass surgery  EGD and EUS showed gastric tumor, biopsy neg for malignancy  Endoscopic resection- 5/25/21 Path Adenocarcinoma, moderate to poorly differentiated, Tumor invades deep layers of submuocsa with deep margin extensively positive. Tumor size 3.0 x 2.2 cm   CT CAP - no LN involvement or metastatic disease  Pt was discussed in UGI tumor board 6/28/2021 , plan for perioperative chemo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

## 2022-02-22 ENCOUNTER — EXTERNAL HOME HEALTH (OUTPATIENT)
Dept: HOME HEALTH SERVICES | Facility: HOSPITAL | Age: 72
End: 2022-02-22
Payer: MEDICARE

## 2022-02-22 PROCEDURE — 77014 PR  CT GUIDANCE PLACEMENT RAD THERAPY FIELDS: ICD-10-PCS | Mod: 26,HCNC,, | Performed by: STUDENT IN AN ORGANIZED HEALTH CARE EDUCATION/TRAINING PROGRAM

## 2022-02-22 PROCEDURE — 77014 HC CT GUIDANCE RADIATION THERAPY FLDS PLACEMENT: CPT | Mod: TC,HCNC | Performed by: STUDENT IN AN ORGANIZED HEALTH CARE EDUCATION/TRAINING PROGRAM

## 2022-02-22 PROCEDURE — 77386 HC IMRT, COMPLEX: CPT | Mod: HCNC | Performed by: STUDENT IN AN ORGANIZED HEALTH CARE EDUCATION/TRAINING PROGRAM

## 2022-02-22 PROCEDURE — 77014 PR  CT GUIDANCE PLACEMENT RAD THERAPY FIELDS: CPT | Mod: 26,HCNC,, | Performed by: STUDENT IN AN ORGANIZED HEALTH CARE EDUCATION/TRAINING PROGRAM

## 2022-02-23 ENCOUNTER — TELEPHONE (OUTPATIENT)
Dept: PAIN MEDICINE | Facility: CLINIC | Age: 72
End: 2022-02-23
Payer: MEDICARE

## 2022-02-23 ENCOUNTER — DOCUMENTATION ONLY (OUTPATIENT)
Dept: RADIATION ONCOLOGY | Facility: CLINIC | Age: 72
End: 2022-02-23
Payer: MEDICARE

## 2022-02-23 DIAGNOSIS — G89.4 CHRONIC PAIN DISORDER: ICD-10-CM

## 2022-02-23 DIAGNOSIS — G89.29 OTHER CHRONIC PAIN: ICD-10-CM

## 2022-02-23 DIAGNOSIS — C16.9 GASTRIC ADENOCARCINOMA: Primary | ICD-10-CM

## 2022-02-23 DIAGNOSIS — G89.29 CHRONIC PAIN OF LEFT KNEE: ICD-10-CM

## 2022-02-23 DIAGNOSIS — M25.562 CHRONIC PAIN OF LEFT KNEE: ICD-10-CM

## 2022-02-23 PROCEDURE — 77014 PR  CT GUIDANCE PLACEMENT RAD THERAPY FIELDS: CPT | Mod: 26,HCNC,, | Performed by: STUDENT IN AN ORGANIZED HEALTH CARE EDUCATION/TRAINING PROGRAM

## 2022-02-23 PROCEDURE — 77014 PR  CT GUIDANCE PLACEMENT RAD THERAPY FIELDS: ICD-10-PCS | Mod: 26,HCNC,, | Performed by: STUDENT IN AN ORGANIZED HEALTH CARE EDUCATION/TRAINING PROGRAM

## 2022-02-23 PROCEDURE — 77014 HC CT GUIDANCE RADIATION THERAPY FLDS PLACEMENT: CPT | Mod: TC,HCNC | Performed by: STUDENT IN AN ORGANIZED HEALTH CARE EDUCATION/TRAINING PROGRAM

## 2022-02-23 PROCEDURE — 77386 HC IMRT, COMPLEX: CPT | Mod: HCNC | Performed by: STUDENT IN AN ORGANIZED HEALTH CARE EDUCATION/TRAINING PROGRAM

## 2022-02-23 RX ORDER — OXYCODONE AND ACETAMINOPHEN 5; 325 MG/1; MG/1
1 TABLET ORAL EVERY 8 HOURS PRN
Qty: 90 TABLET | Refills: 0 | Status: SHIPPED | OUTPATIENT
Start: 2022-02-23 | End: 2022-03-21 | Stop reason: SDUPTHER

## 2022-02-23 NOTE — TELEPHONE ENCOUNTER
----- Message from Maria Eugenia Duffy sent at 2/23/2022  3:01 PM CST -----  Regarding: refill  Type:  RX Refill Request    Who Called: DARCY Hall  Refill or New Rx:refill  RX Name and Strength:oxyCODONE-acetaminophen (PERCOCET) 5-325 mg per tablet  How is the patient currently taking it? (ex. 1XDay):  Is this a 30 day or 90 day RX:30  Preferred Pharmacy with phone number:[address]  Local or Mail Order:local  Ordering Provider:  Would the patient rather a call back or a response via [facility]? call  Best Call Back Number:[phone]  Additional Information:

## 2022-02-23 NOTE — TELEPHONE ENCOUNTER
Staff spoke with patient in regards to refill request and that it has been received and will be submitted to the provider once a response is received patient will be updated.

## 2022-02-23 NOTE — TELEPHONE ENCOUNTER
Patient requesting refill on Oxycodone 5/325  Last office visit 12/28/2021   shows last refill on 01/24/2022  Patient does have a pain contract on file with [facility]  Patient last UDS 10/04/2019 was consistent with current therapy      CODEINE  Not Detected    MORPHINE  Not Detected    6-ACETYLMORPHINE  Not Detected    OXYCODONE  Present    NOROYXCODONE  Present    OXYMORPHONE  Not Detected    NOROXYMORPHONE  Not Detected    HYDROCODONE  Not Detected    NORHYDROCODONE  Not Detected    HYDROMORPHONE  Not Detected    BUPRENORPHINE  Not Detected    NORUBPRENORPHINE  Not Detected    FENTANYL  Not Detected    NORFENTANYL  Not Detected    MEPERIDINE METABOLITE  Not Detected    TAPENTADOL  Not Detected    TAPENTADOL-O-SULF  Not Detected    METHADONE  Not Detected    PROPOXYPHENE  Not Detected    TRAMADOL  Not Detected    AMPHETAMINE  Not Detected    METHAMPHETAMINE  Not Detected    MDMA- ECSTASY  Not Detected    MDA  Not Detected    MDEA- Genoveva  Not Detected    METHYLPHENIDATE  Not Detected    PHENTERMINE  Not Detected    BENZOYLECGONINE  Not Detected    ALPRAZOLAM  Not Detected    ALPHA-OH-ALPRAZOLAM  Not Detected    CLONAZEPAM  Not Detected    7-AMINOCLONAZEPAM  Not Detected    DIAZEPAM  Not Detected    NORDIAZEPAM  Not Detected    OXAZEPAM  Not Detected    TEMAZEPAM  Not Detected    Lorazepam  Not Detected    MIDAZOLAM  Not Detected    ZOLPIDEM  Not Detected    BARBITURATES  Not Detected    Creatinine, Urine 20.0 - 400.0 mg/dL 102.0    ETHYL GLUCURONIDE  Not Detected    MARIJUANA METABOLITE  Not Detected    PCP  Not Detected    CARISOPRODOL  Not Detected

## 2022-02-23 NOTE — PLAN OF CARE
Pt. On day 8 of outpt. Xrt to stomach.  Chemo infusing.  Pt. In WC.  C/o nausea last night relieved w/ medicine.  No vomiting or diarrhea.  C/o constipation, discussed use of stool softener and LOC.

## 2022-02-24 ENCOUNTER — HOSPITAL ENCOUNTER (OUTPATIENT)
Dept: RADIOLOGY | Facility: OTHER | Age: 72
Discharge: HOME | End: 2022-02-24
Attending: RADIOLOGY
Payer: MEDICARE

## 2022-02-24 DIAGNOSIS — C16.9 GASTRIC ADENOCARCINOMA: ICD-10-CM

## 2022-02-24 PROCEDURE — 77014 CT GUIDANCE RADIATION THERAPY FIELD: CPT | Mod: TC,HCNC

## 2022-02-24 PROCEDURE — 77014 PR  CT GUIDANCE PLACEMENT RAD THERAPY FIELDS: ICD-10-PCS | Mod: 26,HCNC,, | Performed by: STUDENT IN AN ORGANIZED HEALTH CARE EDUCATION/TRAINING PROGRAM

## 2022-02-24 PROCEDURE — 77014 PR  CT GUIDANCE PLACEMENT RAD THERAPY FIELDS: CPT | Mod: 26,HCNC,, | Performed by: STUDENT IN AN ORGANIZED HEALTH CARE EDUCATION/TRAINING PROGRAM

## 2022-02-24 PROCEDURE — A9552 F18 FDG: HCPCS

## 2022-02-24 PROCEDURE — 77014 HC CT GUIDANCE RADIATION THERAPY FLDS PLACEMENT: CPT | Mod: TC,HCNC | Performed by: STUDENT IN AN ORGANIZED HEALTH CARE EDUCATION/TRAINING PROGRAM

## 2022-02-24 PROCEDURE — 77386 HC IMRT, COMPLEX: CPT | Mod: HCNC | Performed by: STUDENT IN AN ORGANIZED HEALTH CARE EDUCATION/TRAINING PROGRAM

## 2022-02-24 PROCEDURE — 77386 HC IMRT, COMPLEX: CPT

## 2022-02-25 ENCOUNTER — INFUSION (OUTPATIENT)
Dept: INFUSION THERAPY | Facility: OTHER | Age: 72
End: 2022-02-25
Attending: STUDENT IN AN ORGANIZED HEALTH CARE EDUCATION/TRAINING PROGRAM
Payer: MEDICARE

## 2022-02-25 VITALS
RESPIRATION RATE: 16 BRPM | OXYGEN SATURATION: 97 % | DIASTOLIC BLOOD PRESSURE: 73 MMHG | HEART RATE: 73 BPM | TEMPERATURE: 98 F | SYSTOLIC BLOOD PRESSURE: 142 MMHG

## 2022-02-25 DIAGNOSIS — K59.00 CONSTIPATION, UNSPECIFIED CONSTIPATION TYPE: ICD-10-CM

## 2022-02-25 DIAGNOSIS — C16.9 GASTRIC ADENOCARCINOMA: Primary | ICD-10-CM

## 2022-02-25 PROCEDURE — 77014 PR  CT GUIDANCE PLACEMENT RAD THERAPY FIELDS: ICD-10-PCS | Mod: 26,HCNC,, | Performed by: STUDENT IN AN ORGANIZED HEALTH CARE EDUCATION/TRAINING PROGRAM

## 2022-02-25 PROCEDURE — 77386 HC IMRT, COMPLEX: CPT | Mod: HCNC | Performed by: STUDENT IN AN ORGANIZED HEALTH CARE EDUCATION/TRAINING PROGRAM

## 2022-02-25 PROCEDURE — A4216 STERILE WATER/SALINE, 10 ML: HCPCS | Mod: HCNC | Performed by: STUDENT IN AN ORGANIZED HEALTH CARE EDUCATION/TRAINING PROGRAM

## 2022-02-25 PROCEDURE — 77014 PR  CT GUIDANCE PLACEMENT RAD THERAPY FIELDS: CPT | Mod: 26,HCNC,, | Performed by: STUDENT IN AN ORGANIZED HEALTH CARE EDUCATION/TRAINING PROGRAM

## 2022-02-25 PROCEDURE — 63600175 PHARM REV CODE 636 W HCPCS: Mod: HCNC | Performed by: STUDENT IN AN ORGANIZED HEALTH CARE EDUCATION/TRAINING PROGRAM

## 2022-02-25 PROCEDURE — 25000003 PHARM REV CODE 250: Mod: HCNC | Performed by: STUDENT IN AN ORGANIZED HEALTH CARE EDUCATION/TRAINING PROGRAM

## 2022-02-25 PROCEDURE — 77014 HC CT GUIDANCE RADIATION THERAPY FLDS PLACEMENT: CPT | Mod: TC,HCNC | Performed by: STUDENT IN AN ORGANIZED HEALTH CARE EDUCATION/TRAINING PROGRAM

## 2022-02-25 RX ORDER — SODIUM CHLORIDE 0.9 % (FLUSH) 0.9 %
10 SYRINGE (ML) INJECTION
Status: DISCONTINUED | OUTPATIENT
Start: 2022-02-25 | End: 2022-02-25 | Stop reason: HOSPADM

## 2022-02-25 RX ORDER — LACTULOSE 10 G/15ML
20 SOLUTION ORAL; RECTAL 2 TIMES DAILY PRN
Qty: 120 ML | Refills: 1 | Status: SHIPPED | OUTPATIENT
Start: 2022-02-25 | End: 2024-01-04 | Stop reason: CLARIF

## 2022-02-25 RX ORDER — HEPARIN 100 UNIT/ML
500 SYRINGE INTRAVENOUS
Status: DISCONTINUED | OUTPATIENT
Start: 2022-02-25 | End: 2022-02-25 | Stop reason: HOSPADM

## 2022-02-25 RX ADMIN — HEPARIN 500 UNITS: 100 SYRINGE at 01:02

## 2022-02-25 RX ADMIN — Medication 10 ML: at 01:02

## 2022-02-28 ENCOUNTER — OFFICE VISIT (OUTPATIENT)
Dept: HEMATOLOGY/ONCOLOGY | Facility: CLINIC | Age: 72
End: 2022-02-28
Payer: MEDICARE

## 2022-02-28 ENCOUNTER — INFUSION (OUTPATIENT)
Dept: INFUSION THERAPY | Facility: OTHER | Age: 72
End: 2022-02-28
Attending: STUDENT IN AN ORGANIZED HEALTH CARE EDUCATION/TRAINING PROGRAM
Payer: MEDICARE

## 2022-02-28 VITALS
OXYGEN SATURATION: 98 % | HEIGHT: 66 IN | HEART RATE: 71 BPM | DIASTOLIC BLOOD PRESSURE: 58 MMHG | WEIGHT: 293 LBS | RESPIRATION RATE: 16 BRPM | BODY MASS INDEX: 47.09 KG/M2 | TEMPERATURE: 98 F | SYSTOLIC BLOOD PRESSURE: 127 MMHG

## 2022-02-28 DIAGNOSIS — I50.32 CHRONIC DIASTOLIC CONGESTIVE HEART FAILURE: ICD-10-CM

## 2022-02-28 DIAGNOSIS — C16.9 GASTRIC ADENOCARCINOMA: Primary | ICD-10-CM

## 2022-02-28 DIAGNOSIS — D84.81 IMMUNODEFICIENCY DUE TO CONDITIONS CLASSIFIED ELSEWHERE: ICD-10-CM

## 2022-02-28 DIAGNOSIS — E66.01 MORBID OBESITY: ICD-10-CM

## 2022-02-28 DIAGNOSIS — I10 ESSENTIAL HYPERTENSION: ICD-10-CM

## 2022-02-28 DIAGNOSIS — I82.411 DVT OF DEEP FEMORAL VEIN, RIGHT: ICD-10-CM

## 2022-02-28 PROCEDURE — 3008F BODY MASS INDEX DOCD: CPT | Mod: HCNC,CPTII,S$GLB, | Performed by: STUDENT IN AN ORGANIZED HEALTH CARE EDUCATION/TRAINING PROGRAM

## 2022-02-28 PROCEDURE — 1101F PR PT FALLS ASSESS DOC 0-1 FALLS W/OUT INJ PAST YR: ICD-10-PCS | Mod: HCNC,CPTII,S$GLB, | Performed by: STUDENT IN AN ORGANIZED HEALTH CARE EDUCATION/TRAINING PROGRAM

## 2022-02-28 PROCEDURE — 99215 OFFICE O/P EST HI 40 MIN: CPT | Mod: HCNC,S$GLB,, | Performed by: STUDENT IN AN ORGANIZED HEALTH CARE EDUCATION/TRAINING PROGRAM

## 2022-02-28 PROCEDURE — 3078F DIAST BP <80 MM HG: CPT | Mod: HCNC,CPTII,S$GLB, | Performed by: STUDENT IN AN ORGANIZED HEALTH CARE EDUCATION/TRAINING PROGRAM

## 2022-02-28 PROCEDURE — 3074F SYST BP LT 130 MM HG: CPT | Mod: HCNC,CPTII,S$GLB, | Performed by: STUDENT IN AN ORGANIZED HEALTH CARE EDUCATION/TRAINING PROGRAM

## 2022-02-28 PROCEDURE — 99215 PR OFFICE/OUTPT VISIT, EST, LEVL V, 40-54 MIN: ICD-10-PCS | Mod: HCNC,S$GLB,, | Performed by: STUDENT IN AN ORGANIZED HEALTH CARE EDUCATION/TRAINING PROGRAM

## 2022-02-28 PROCEDURE — 3288F FALL RISK ASSESSMENT DOCD: CPT | Mod: HCNC,CPTII,S$GLB, | Performed by: STUDENT IN AN ORGANIZED HEALTH CARE EDUCATION/TRAINING PROGRAM

## 2022-02-28 PROCEDURE — 1159F PR MEDICATION LIST DOCUMENTED IN MEDICAL RECORD: ICD-10-PCS | Mod: HCNC,CPTII,S$GLB, | Performed by: STUDENT IN AN ORGANIZED HEALTH CARE EDUCATION/TRAINING PROGRAM

## 2022-02-28 PROCEDURE — 25000003 PHARM REV CODE 250: Mod: HCNC | Performed by: STUDENT IN AN ORGANIZED HEALTH CARE EDUCATION/TRAINING PROGRAM

## 2022-02-28 PROCEDURE — 3074F PR MOST RECENT SYSTOLIC BLOOD PRESSURE < 130 MM HG: ICD-10-PCS | Mod: HCNC,CPTII,S$GLB, | Performed by: STUDENT IN AN ORGANIZED HEALTH CARE EDUCATION/TRAINING PROGRAM

## 2022-02-28 PROCEDURE — 96416 CHEMO PROLONG INFUSE W/PUMP: CPT | Mod: HCNC

## 2022-02-28 PROCEDURE — 99499 UNLISTED E&M SERVICE: CPT | Mod: S$GLB,,, | Performed by: STUDENT IN AN ORGANIZED HEALTH CARE EDUCATION/TRAINING PROGRAM

## 2022-02-28 PROCEDURE — 96375 TX/PRO/DX INJ NEW DRUG ADDON: CPT | Mod: HCNC

## 2022-02-28 PROCEDURE — 1159F MED LIST DOCD IN RCRD: CPT | Mod: HCNC,CPTII,S$GLB, | Performed by: STUDENT IN AN ORGANIZED HEALTH CARE EDUCATION/TRAINING PROGRAM

## 2022-02-28 PROCEDURE — 99499 RISK ADDL DX/OHS AUDIT: ICD-10-PCS | Mod: S$GLB,,, | Performed by: STUDENT IN AN ORGANIZED HEALTH CARE EDUCATION/TRAINING PROGRAM

## 2022-02-28 PROCEDURE — 1126F PR PAIN SEVERITY QUANTIFIED, NO PAIN PRESENT: ICD-10-PCS | Mod: HCNC,CPTII,S$GLB, | Performed by: STUDENT IN AN ORGANIZED HEALTH CARE EDUCATION/TRAINING PROGRAM

## 2022-02-28 PROCEDURE — 3288F PR FALLS RISK ASSESSMENT DOCUMENTED: ICD-10-PCS | Mod: HCNC,CPTII,S$GLB, | Performed by: STUDENT IN AN ORGANIZED HEALTH CARE EDUCATION/TRAINING PROGRAM

## 2022-02-28 PROCEDURE — 99999 PR PBB SHADOW E&M-EST. PATIENT-LVL IV: ICD-10-PCS | Mod: PBBFAC,HCNC,, | Performed by: STUDENT IN AN ORGANIZED HEALTH CARE EDUCATION/TRAINING PROGRAM

## 2022-02-28 PROCEDURE — 99999 PR PBB SHADOW E&M-EST. PATIENT-LVL IV: CPT | Mod: PBBFAC,HCNC,, | Performed by: STUDENT IN AN ORGANIZED HEALTH CARE EDUCATION/TRAINING PROGRAM

## 2022-02-28 PROCEDURE — 63600175 PHARM REV CODE 636 W HCPCS: Mod: HCNC | Performed by: STUDENT IN AN ORGANIZED HEALTH CARE EDUCATION/TRAINING PROGRAM

## 2022-02-28 PROCEDURE — 3078F PR MOST RECENT DIASTOLIC BLOOD PRESSURE < 80 MM HG: ICD-10-PCS | Mod: HCNC,CPTII,S$GLB, | Performed by: STUDENT IN AN ORGANIZED HEALTH CARE EDUCATION/TRAINING PROGRAM

## 2022-02-28 PROCEDURE — 3008F PR BODY MASS INDEX (BMI) DOCUMENTED: ICD-10-PCS | Mod: HCNC,CPTII,S$GLB, | Performed by: STUDENT IN AN ORGANIZED HEALTH CARE EDUCATION/TRAINING PROGRAM

## 2022-02-28 PROCEDURE — 1101F PT FALLS ASSESS-DOCD LE1/YR: CPT | Mod: HCNC,CPTII,S$GLB, | Performed by: STUDENT IN AN ORGANIZED HEALTH CARE EDUCATION/TRAINING PROGRAM

## 2022-02-28 PROCEDURE — 1126F AMNT PAIN NOTED NONE PRSNT: CPT | Mod: HCNC,CPTII,S$GLB, | Performed by: STUDENT IN AN ORGANIZED HEALTH CARE EDUCATION/TRAINING PROGRAM

## 2022-02-28 RX ORDER — HEPARIN 100 UNIT/ML
500 SYRINGE INTRAVENOUS
Status: CANCELLED | OUTPATIENT
Start: 2022-02-28

## 2022-02-28 RX ORDER — EPINEPHRINE 0.3 MG/.3ML
0.3 INJECTION SUBCUTANEOUS ONCE AS NEEDED
Status: CANCELLED | OUTPATIENT
Start: 2022-02-28

## 2022-02-28 RX ORDER — SODIUM CHLORIDE 0.9 % (FLUSH) 0.9 %
10 SYRINGE (ML) INJECTION
Status: CANCELLED | OUTPATIENT
Start: 2022-03-04

## 2022-02-28 RX ORDER — EPINEPHRINE 0.3 MG/.3ML
0.3 INJECTION SUBCUTANEOUS ONCE AS NEEDED
Status: DISCONTINUED | OUTPATIENT
Start: 2022-02-28 | End: 2022-02-28 | Stop reason: HOSPADM

## 2022-02-28 RX ORDER — SODIUM CHLORIDE 0.9 % (FLUSH) 0.9 %
10 SYRINGE (ML) INJECTION
Status: CANCELLED | OUTPATIENT
Start: 2022-02-28

## 2022-02-28 RX ORDER — DIPHENHYDRAMINE HYDROCHLORIDE 50 MG/ML
50 INJECTION INTRAMUSCULAR; INTRAVENOUS ONCE AS NEEDED
Status: CANCELLED | OUTPATIENT
Start: 2022-02-28

## 2022-02-28 RX ORDER — DIPHENHYDRAMINE HYDROCHLORIDE 50 MG/ML
50 INJECTION INTRAMUSCULAR; INTRAVENOUS ONCE AS NEEDED
Status: DISCONTINUED | OUTPATIENT
Start: 2022-02-28 | End: 2022-02-28 | Stop reason: HOSPADM

## 2022-02-28 RX ORDER — ONDANSETRON 2 MG/ML
8 INJECTION INTRAMUSCULAR; INTRAVENOUS
Status: COMPLETED | OUTPATIENT
Start: 2022-02-28 | End: 2022-02-28

## 2022-02-28 RX ORDER — HEPARIN 100 UNIT/ML
500 SYRINGE INTRAVENOUS
Status: DISCONTINUED | OUTPATIENT
Start: 2022-02-28 | End: 2022-02-28 | Stop reason: HOSPADM

## 2022-02-28 RX ORDER — HEPARIN 100 UNIT/ML
500 SYRINGE INTRAVENOUS
Status: CANCELLED | OUTPATIENT
Start: 2022-03-04

## 2022-02-28 RX ORDER — ONDANSETRON 2 MG/ML
8 INJECTION INTRAMUSCULAR; INTRAVENOUS
Status: CANCELLED | OUTPATIENT
Start: 2022-02-28

## 2022-02-28 RX ORDER — SODIUM CHLORIDE 0.9 % (FLUSH) 0.9 %
10 SYRINGE (ML) INJECTION
Status: DISCONTINUED | OUTPATIENT
Start: 2022-02-28 | End: 2022-02-28 | Stop reason: HOSPADM

## 2022-02-28 RX ADMIN — FLUOROURACIL 2285 MG: 50 INJECTION, SOLUTION INTRAVENOUS at 11:02

## 2022-02-28 RX ADMIN — ONDANSETRON 8 MG: 2 INJECTION INTRAMUSCULAR; INTRAVENOUS at 11:02

## 2022-02-28 NOTE — PLAN OF CARE
Vital Signs Stable, No apparent distress noted; Pt tolerated _5FU chemo w/o difficulty.  Port-a-cath (accessed w/a 20g, 0.75in Foster),biopatch in place, w/ home chemo pump attached and secured, infusing w/o difficulty.  Positive blood return noted.  No bleeding,swelling or drainage noted to the site.  Discharge instructions given;Pt verbalizes understanding. Next appointments scheduled  Discharged to Rad Onc per wheelchair

## 2022-03-01 PROCEDURE — 77336 RADIATION PHYSICS CONSULT: CPT | Performed by: STUDENT IN AN ORGANIZED HEALTH CARE EDUCATION/TRAINING PROGRAM

## 2022-03-02 ENCOUNTER — APPOINTMENT (OUTPATIENT)
Dept: RADIATION THERAPY | Facility: OTHER | Age: 72
End: 2022-03-02
Attending: STUDENT IN AN ORGANIZED HEALTH CARE EDUCATION/TRAINING PROGRAM
Payer: MEDICARE

## 2022-03-02 DIAGNOSIS — I10 ESSENTIAL HYPERTENSION: ICD-10-CM

## 2022-03-02 PROCEDURE — 77014 HC CT GUIDANCE RADIATION THERAPY FLDS PLACEMENT: CPT | Mod: TC | Performed by: STUDENT IN AN ORGANIZED HEALTH CARE EDUCATION/TRAINING PROGRAM

## 2022-03-02 PROCEDURE — 77014 PR  CT GUIDANCE PLACEMENT RAD THERAPY FIELDS: ICD-10-PCS | Mod: 26,,, | Performed by: STUDENT IN AN ORGANIZED HEALTH CARE EDUCATION/TRAINING PROGRAM

## 2022-03-02 PROCEDURE — 77014 PR  CT GUIDANCE PLACEMENT RAD THERAPY FIELDS: CPT | Mod: 26,,, | Performed by: STUDENT IN AN ORGANIZED HEALTH CARE EDUCATION/TRAINING PROGRAM

## 2022-03-02 PROCEDURE — 77386 HC IMRT, COMPLEX: CPT | Performed by: STUDENT IN AN ORGANIZED HEALTH CARE EDUCATION/TRAINING PROGRAM

## 2022-03-03 ENCOUNTER — DOCUMENTATION ONLY (OUTPATIENT)
Dept: RADIATION ONCOLOGY | Facility: CLINIC | Age: 72
End: 2022-03-03
Payer: MEDICARE

## 2022-03-03 ENCOUNTER — HOSPITAL ENCOUNTER (OUTPATIENT)
Dept: RADIOLOGY | Facility: OTHER | Age: 72
Discharge: HOME | End: 2022-03-03
Attending: STUDENT IN AN ORGANIZED HEALTH CARE EDUCATION/TRAINING PROGRAM
Payer: MEDICARE

## 2022-03-03 DIAGNOSIS — C16.9 GASTRIC ADENOCARCINOMA: Primary | ICD-10-CM

## 2022-03-03 DIAGNOSIS — C16.9 GASTRIC ADENOCARCINOMA: ICD-10-CM

## 2022-03-03 DIAGNOSIS — K59.00 CONSTIPATION, UNSPECIFIED CONSTIPATION TYPE: ICD-10-CM

## 2022-03-03 PROCEDURE — 77014 CT GUIDANCE RADIATION THERAPY FIELD: CPT | Mod: TC

## 2022-03-03 PROCEDURE — 77014 PR  CT GUIDANCE PLACEMENT RAD THERAPY FIELDS: ICD-10-PCS | Mod: 26,,, | Performed by: STUDENT IN AN ORGANIZED HEALTH CARE EDUCATION/TRAINING PROGRAM

## 2022-03-03 PROCEDURE — 77014 HC CT GUIDANCE RADIATION THERAPY FLDS PLACEMENT: CPT | Mod: TC | Performed by: STUDENT IN AN ORGANIZED HEALTH CARE EDUCATION/TRAINING PROGRAM

## 2022-03-03 PROCEDURE — 77386 HC IMRT, COMPLEX: CPT | Performed by: STUDENT IN AN ORGANIZED HEALTH CARE EDUCATION/TRAINING PROGRAM

## 2022-03-03 PROCEDURE — A9552 F18 FDG: HCPCS

## 2022-03-03 PROCEDURE — 77014 PR  CT GUIDANCE PLACEMENT RAD THERAPY FIELDS: CPT | Mod: 26,,, | Performed by: STUDENT IN AN ORGANIZED HEALTH CARE EDUCATION/TRAINING PROGRAM

## 2022-03-03 RX ORDER — POLYETHYLENE GLYCOL 3350 17 G/17G
17 POWDER, FOR SOLUTION ORAL DAILY
Qty: 510 G | Refills: 1 | Status: SHIPPED | OUTPATIENT
Start: 2022-03-03 | End: 2022-04-02

## 2022-03-03 NOTE — TELEPHONE ENCOUNTER
No new care gaps identified.  Powered by [employer]. Reference number: [id number].   3/02/2022 8:08:47 PM CST

## 2022-03-03 NOTE — PLAN OF CARE
Pt. On day 12 of outpt. Xrt to stomach.  Pt.  With intermittent nausea.  BM today.  Re-ct'd for xrt.

## 2022-03-04 ENCOUNTER — INFUSION (OUTPATIENT)
Dept: INFUSION THERAPY | Facility: OTHER | Age: 72
End: 2022-03-04
Attending: INTERNAL MEDICINE
Payer: MEDICARE

## 2022-03-04 VITALS
RESPIRATION RATE: 16 BRPM | TEMPERATURE: 98 F | DIASTOLIC BLOOD PRESSURE: 53 MMHG | OXYGEN SATURATION: 95 % | HEART RATE: 76 BPM | SYSTOLIC BLOOD PRESSURE: 120 MMHG

## 2022-03-04 DIAGNOSIS — C16.9 GASTRIC ADENOCARCINOMA: Primary | ICD-10-CM

## 2022-03-04 PROCEDURE — 77014 PR  CT GUIDANCE PLACEMENT RAD THERAPY FIELDS: CPT | Mod: 26,,, | Performed by: STUDENT IN AN ORGANIZED HEALTH CARE EDUCATION/TRAINING PROGRAM

## 2022-03-04 PROCEDURE — 77014 HC CT GUIDANCE RADIATION THERAPY FLDS PLACEMENT: CPT | Mod: TC | Performed by: STUDENT IN AN ORGANIZED HEALTH CARE EDUCATION/TRAINING PROGRAM

## 2022-03-04 PROCEDURE — 25000003 PHARM REV CODE 250: Performed by: STUDENT IN AN ORGANIZED HEALTH CARE EDUCATION/TRAINING PROGRAM

## 2022-03-04 PROCEDURE — 99211 OFF/OP EST MAY X REQ PHY/QHP: CPT

## 2022-03-04 PROCEDURE — 77014 PR  CT GUIDANCE PLACEMENT RAD THERAPY FIELDS: ICD-10-PCS | Mod: 26,,, | Performed by: STUDENT IN AN ORGANIZED HEALTH CARE EDUCATION/TRAINING PROGRAM

## 2022-03-04 PROCEDURE — 77386 HC IMRT, COMPLEX: CPT | Performed by: STUDENT IN AN ORGANIZED HEALTH CARE EDUCATION/TRAINING PROGRAM

## 2022-03-04 PROCEDURE — A4216 STERILE WATER/SALINE, 10 ML: HCPCS | Performed by: STUDENT IN AN ORGANIZED HEALTH CARE EDUCATION/TRAINING PROGRAM

## 2022-03-04 PROCEDURE — 63600175 PHARM REV CODE 636 W HCPCS: Performed by: STUDENT IN AN ORGANIZED HEALTH CARE EDUCATION/TRAINING PROGRAM

## 2022-03-04 RX ORDER — HEPARIN 100 UNIT/ML
500 SYRINGE INTRAVENOUS
Status: DISCONTINUED | OUTPATIENT
Start: 2022-03-04 | End: 2022-03-04 | Stop reason: HOSPADM

## 2022-03-04 RX ORDER — SODIUM CHLORIDE 0.9 % (FLUSH) 0.9 %
10 SYRINGE (ML) INJECTION
Status: DISCONTINUED | OUTPATIENT
Start: 2022-03-04 | End: 2022-03-04 | Stop reason: HOSPADM

## 2022-03-04 RX ADMIN — Medication 10 ML: at 12:03

## 2022-03-04 RX ADMIN — HEPARIN 500 UNITS: 100 SYRINGE at 12:03

## 2022-03-07 ENCOUNTER — OFFICE VISIT (OUTPATIENT)
Dept: HEMATOLOGY/ONCOLOGY | Facility: CLINIC | Age: 72
End: 2022-03-07
Payer: MEDICARE

## 2022-03-07 ENCOUNTER — LAB VISIT (OUTPATIENT)
Dept: LAB | Facility: OTHER | Age: 72
End: 2022-03-07
Attending: STUDENT IN AN ORGANIZED HEALTH CARE EDUCATION/TRAINING PROGRAM
Payer: MEDICARE

## 2022-03-07 ENCOUNTER — INFUSION (OUTPATIENT)
Dept: INFUSION THERAPY | Facility: OTHER | Age: 72
End: 2022-03-07
Attending: STUDENT IN AN ORGANIZED HEALTH CARE EDUCATION/TRAINING PROGRAM
Payer: MEDICARE

## 2022-03-07 VITALS
DIASTOLIC BLOOD PRESSURE: 63 MMHG | HEART RATE: 92 BPM | RESPIRATION RATE: 17 BRPM | WEIGHT: 293 LBS | SYSTOLIC BLOOD PRESSURE: 110 MMHG | OXYGEN SATURATION: 97 % | TEMPERATURE: 99 F | HEIGHT: 66 IN | BODY MASS INDEX: 47.09 KG/M2

## 2022-03-07 DIAGNOSIS — C16.9 GASTRIC ADENOCARCINOMA: ICD-10-CM

## 2022-03-07 DIAGNOSIS — Z86.718 HISTORY OF DVT (DEEP VEIN THROMBOSIS): Chronic | ICD-10-CM

## 2022-03-07 DIAGNOSIS — I50.32 CHRONIC DIASTOLIC CONGESTIVE HEART FAILURE: Primary | Chronic | ICD-10-CM

## 2022-03-07 DIAGNOSIS — I10 ESSENTIAL HYPERTENSION: ICD-10-CM

## 2022-03-07 DIAGNOSIS — E66.01 CLASS 3 SEVERE OBESITY DUE TO EXCESS CALORIES WITH SERIOUS COMORBIDITY AND BODY MASS INDEX (BMI) OF 50.0 TO 59.9 IN ADULT: ICD-10-CM

## 2022-03-07 DIAGNOSIS — C16.9 GASTRIC ADENOCARCINOMA: Primary | ICD-10-CM

## 2022-03-07 DIAGNOSIS — F32.0 CURRENT MILD EPISODE OF MAJOR DEPRESSIVE DISORDER WITHOUT PRIOR EPISODE: ICD-10-CM

## 2022-03-07 DIAGNOSIS — M17.12 PRIMARY OSTEOARTHRITIS OF LEFT KNEE: ICD-10-CM

## 2022-03-07 LAB
ALBUMIN SERPL BCP-MCNC: 3.7 G/DL (ref 3.5–5.2)
ALP SERPL-CCNC: 104 U/L (ref 55–135)
ALT SERPL W/O P-5'-P-CCNC: 14 U/L (ref 10–44)
ANION GAP SERPL CALC-SCNC: 10 MMOL/L (ref 8–16)
AST SERPL-CCNC: 20 U/L (ref 10–40)
BASOPHILS # BLD AUTO: 0.03 K/UL (ref 0–0.2)
BASOPHILS NFR BLD: 0.4 % (ref 0–1.9)
BILIRUB SERPL-MCNC: 0.6 MG/DL (ref 0.1–1)
BUN SERPL-MCNC: 15 MG/DL (ref 8–23)
CALCIUM SERPL-MCNC: 9.6 MG/DL (ref 8.7–10.5)
CHLORIDE SERPL-SCNC: 102 MMOL/L (ref 95–110)
CO2 SERPL-SCNC: 28 MMOL/L (ref 23–29)
CREAT SERPL-MCNC: 0.9 MG/DL (ref 0.5–1.4)
DIFFERENTIAL METHOD: ABNORMAL
EOSINOPHIL # BLD AUTO: 0 K/UL (ref 0–0.5)
EOSINOPHIL NFR BLD: 0.5 % (ref 0–8)
ERYTHROCYTE [DISTWIDTH] IN BLOOD BY AUTOMATED COUNT: 13.4 % (ref 11.5–14.5)
EST. GFR  (AFRICAN AMERICAN): >60 ML/MIN/1.73 M^2
EST. GFR  (NON AFRICAN AMERICAN): >60 ML/MIN/1.73 M^2
GLUCOSE SERPL-MCNC: 111 MG/DL (ref 70–110)
HCT VFR BLD AUTO: 42.3 % (ref 37–48.5)
HGB BLD-MCNC: 13.5 G/DL (ref 12–16)
IMM GRANULOCYTES # BLD AUTO: 0.05 K/UL (ref 0–0.04)
IMM GRANULOCYTES NFR BLD AUTO: 0.6 % (ref 0–0.5)
LYMPHOCYTES # BLD AUTO: 0.4 K/UL (ref 1–4.8)
LYMPHOCYTES NFR BLD: 4.7 % (ref 18–48)
MAGNESIUM SERPL-MCNC: 1.7 MG/DL (ref 1.6–2.6)
MCH RBC QN AUTO: 32 PG (ref 27–31)
MCHC RBC AUTO-ENTMCNC: 31.9 G/DL (ref 32–36)
MCV RBC AUTO: 100 FL (ref 82–98)
MONOCYTES # BLD AUTO: 0.9 K/UL (ref 0.3–1)
MONOCYTES NFR BLD: 9.9 % (ref 4–15)
NEUTROPHILS # BLD AUTO: 7.2 K/UL (ref 1.8–7.7)
NEUTROPHILS NFR BLD: 83.9 % (ref 38–73)
NRBC BLD-RTO: 0 /100 WBC
PLATELET # BLD AUTO: 157 K/UL (ref 150–450)
PMV BLD AUTO: 10.8 FL (ref 9.2–12.9)
POTASSIUM SERPL-SCNC: 3.5 MMOL/L (ref 3.5–5.1)
PROT SERPL-MCNC: 7.3 G/DL (ref 6–8.4)
RBC # BLD AUTO: 4.22 M/UL (ref 4–5.4)
SODIUM SERPL-SCNC: 140 MMOL/L (ref 136–145)
WBC # BLD AUTO: 8.55 K/UL (ref 3.9–12.7)

## 2022-03-07 PROCEDURE — 99999 PR PBB SHADOW E&M-EST. PATIENT-LVL IV: CPT | Mod: PBBFAC,,, | Performed by: STUDENT IN AN ORGANIZED HEALTH CARE EDUCATION/TRAINING PROGRAM

## 2022-03-07 PROCEDURE — 3074F PR MOST RECENT SYSTOLIC BLOOD PRESSURE < 130 MM HG: ICD-10-PCS | Mod: CPTII,S$GLB,, | Performed by: STUDENT IN AN ORGANIZED HEALTH CARE EDUCATION/TRAINING PROGRAM

## 2022-03-07 PROCEDURE — 83735 ASSAY OF MAGNESIUM: CPT | Performed by: STUDENT IN AN ORGANIZED HEALTH CARE EDUCATION/TRAINING PROGRAM

## 2022-03-07 PROCEDURE — 99215 PR OFFICE/OUTPT VISIT, EST, LEVL V, 40-54 MIN: ICD-10-PCS | Mod: S$GLB,,, | Performed by: STUDENT IN AN ORGANIZED HEALTH CARE EDUCATION/TRAINING PROGRAM

## 2022-03-07 PROCEDURE — 3074F SYST BP LT 130 MM HG: CPT | Mod: CPTII,S$GLB,, | Performed by: STUDENT IN AN ORGANIZED HEALTH CARE EDUCATION/TRAINING PROGRAM

## 2022-03-07 PROCEDURE — 1159F MED LIST DOCD IN RCRD: CPT | Mod: CPTII,S$GLB,, | Performed by: STUDENT IN AN ORGANIZED HEALTH CARE EDUCATION/TRAINING PROGRAM

## 2022-03-07 PROCEDURE — 77014 PR  CT GUIDANCE PLACEMENT RAD THERAPY FIELDS: CPT | Mod: 26,,, | Performed by: STUDENT IN AN ORGANIZED HEALTH CARE EDUCATION/TRAINING PROGRAM

## 2022-03-07 PROCEDURE — 99999 PR PBB SHADOW E&M-EST. PATIENT-LVL IV: ICD-10-PCS | Mod: PBBFAC,,, | Performed by: STUDENT IN AN ORGANIZED HEALTH CARE EDUCATION/TRAINING PROGRAM

## 2022-03-07 PROCEDURE — 3008F BODY MASS INDEX DOCD: CPT | Mod: CPTII,S$GLB,, | Performed by: STUDENT IN AN ORGANIZED HEALTH CARE EDUCATION/TRAINING PROGRAM

## 2022-03-07 PROCEDURE — 36415 COLL VENOUS BLD VENIPUNCTURE: CPT | Performed by: STUDENT IN AN ORGANIZED HEALTH CARE EDUCATION/TRAINING PROGRAM

## 2022-03-07 PROCEDURE — 1159F PR MEDICATION LIST DOCUMENTED IN MEDICAL RECORD: ICD-10-PCS | Mod: CPTII,S$GLB,, | Performed by: STUDENT IN AN ORGANIZED HEALTH CARE EDUCATION/TRAINING PROGRAM

## 2022-03-07 PROCEDURE — 3008F PR BODY MASS INDEX (BMI) DOCUMENTED: ICD-10-PCS | Mod: CPTII,S$GLB,, | Performed by: STUDENT IN AN ORGANIZED HEALTH CARE EDUCATION/TRAINING PROGRAM

## 2022-03-07 PROCEDURE — 1126F PR PAIN SEVERITY QUANTIFIED, NO PAIN PRESENT: ICD-10-PCS | Mod: CPTII,S$GLB,, | Performed by: STUDENT IN AN ORGANIZED HEALTH CARE EDUCATION/TRAINING PROGRAM

## 2022-03-07 PROCEDURE — 3288F FALL RISK ASSESSMENT DOCD: CPT | Mod: CPTII,S$GLB,, | Performed by: STUDENT IN AN ORGANIZED HEALTH CARE EDUCATION/TRAINING PROGRAM

## 2022-03-07 PROCEDURE — 85025 COMPLETE CBC W/AUTO DIFF WBC: CPT | Performed by: STUDENT IN AN ORGANIZED HEALTH CARE EDUCATION/TRAINING PROGRAM

## 2022-03-07 PROCEDURE — 3078F PR MOST RECENT DIASTOLIC BLOOD PRESSURE < 80 MM HG: ICD-10-PCS | Mod: CPTII,S$GLB,, | Performed by: STUDENT IN AN ORGANIZED HEALTH CARE EDUCATION/TRAINING PROGRAM

## 2022-03-07 PROCEDURE — 63600175 PHARM REV CODE 636 W HCPCS: Performed by: STUDENT IN AN ORGANIZED HEALTH CARE EDUCATION/TRAINING PROGRAM

## 2022-03-07 PROCEDURE — 80053 COMPREHEN METABOLIC PANEL: CPT | Performed by: STUDENT IN AN ORGANIZED HEALTH CARE EDUCATION/TRAINING PROGRAM

## 2022-03-07 PROCEDURE — 25000003 PHARM REV CODE 250: Performed by: STUDENT IN AN ORGANIZED HEALTH CARE EDUCATION/TRAINING PROGRAM

## 2022-03-07 PROCEDURE — 1101F PT FALLS ASSESS-DOCD LE1/YR: CPT | Mod: CPTII,S$GLB,, | Performed by: STUDENT IN AN ORGANIZED HEALTH CARE EDUCATION/TRAINING PROGRAM

## 2022-03-07 PROCEDURE — 1160F PR REVIEW ALL MEDS BY PRESCRIBER/CLIN PHARMACIST DOCUMENTED: ICD-10-PCS | Mod: CPTII,S$GLB,, | Performed by: STUDENT IN AN ORGANIZED HEALTH CARE EDUCATION/TRAINING PROGRAM

## 2022-03-07 PROCEDURE — 96416 CHEMO PROLONG INFUSE W/PUMP: CPT

## 2022-03-07 PROCEDURE — 99215 OFFICE O/P EST HI 40 MIN: CPT | Mod: S$GLB,,, | Performed by: STUDENT IN AN ORGANIZED HEALTH CARE EDUCATION/TRAINING PROGRAM

## 2022-03-07 PROCEDURE — 3288F PR FALLS RISK ASSESSMENT DOCUMENTED: ICD-10-PCS | Mod: CPTII,S$GLB,, | Performed by: STUDENT IN AN ORGANIZED HEALTH CARE EDUCATION/TRAINING PROGRAM

## 2022-03-07 PROCEDURE — 96374 THER/PROPH/DIAG INJ IV PUSH: CPT

## 2022-03-07 PROCEDURE — 3078F DIAST BP <80 MM HG: CPT | Mod: CPTII,S$GLB,, | Performed by: STUDENT IN AN ORGANIZED HEALTH CARE EDUCATION/TRAINING PROGRAM

## 2022-03-07 PROCEDURE — 77386 HC IMRT, COMPLEX: CPT | Performed by: STUDENT IN AN ORGANIZED HEALTH CARE EDUCATION/TRAINING PROGRAM

## 2022-03-07 PROCEDURE — 77014 HC CT GUIDANCE RADIATION THERAPY FLDS PLACEMENT: CPT | Mod: TC | Performed by: STUDENT IN AN ORGANIZED HEALTH CARE EDUCATION/TRAINING PROGRAM

## 2022-03-07 PROCEDURE — 1126F AMNT PAIN NOTED NONE PRSNT: CPT | Mod: CPTII,S$GLB,, | Performed by: STUDENT IN AN ORGANIZED HEALTH CARE EDUCATION/TRAINING PROGRAM

## 2022-03-07 PROCEDURE — 77014 PR  CT GUIDANCE PLACEMENT RAD THERAPY FIELDS: ICD-10-PCS | Mod: 26,,, | Performed by: STUDENT IN AN ORGANIZED HEALTH CARE EDUCATION/TRAINING PROGRAM

## 2022-03-07 PROCEDURE — 1160F RVW MEDS BY RX/DR IN RCRD: CPT | Mod: CPTII,S$GLB,, | Performed by: STUDENT IN AN ORGANIZED HEALTH CARE EDUCATION/TRAINING PROGRAM

## 2022-03-07 PROCEDURE — 1101F PR PT FALLS ASSESS DOC 0-1 FALLS W/OUT INJ PAST YR: ICD-10-PCS | Mod: CPTII,S$GLB,, | Performed by: STUDENT IN AN ORGANIZED HEALTH CARE EDUCATION/TRAINING PROGRAM

## 2022-03-07 RX ORDER — HEPARIN 100 UNIT/ML
500 SYRINGE INTRAVENOUS
Status: CANCELLED | OUTPATIENT
Start: 2022-03-07

## 2022-03-07 RX ORDER — ONDANSETRON 2 MG/ML
8 INJECTION INTRAMUSCULAR; INTRAVENOUS
Status: CANCELLED | OUTPATIENT
Start: 2022-03-07

## 2022-03-07 RX ORDER — HEPARIN 100 UNIT/ML
500 SYRINGE INTRAVENOUS
Status: DISCONTINUED | OUTPATIENT
Start: 2022-03-07 | End: 2022-03-07 | Stop reason: HOSPADM

## 2022-03-07 RX ORDER — DIPHENHYDRAMINE HYDROCHLORIDE 50 MG/ML
50 INJECTION INTRAMUSCULAR; INTRAVENOUS ONCE AS NEEDED
Status: DISCONTINUED | OUTPATIENT
Start: 2022-03-07 | End: 2022-03-07 | Stop reason: HOSPADM

## 2022-03-07 RX ORDER — ONDANSETRON 2 MG/ML
8 INJECTION INTRAMUSCULAR; INTRAVENOUS
Status: COMPLETED | OUTPATIENT
Start: 2022-03-07 | End: 2022-03-07

## 2022-03-07 RX ORDER — EPINEPHRINE 0.3 MG/.3ML
0.3 INJECTION SUBCUTANEOUS ONCE AS NEEDED
Status: CANCELLED | OUTPATIENT
Start: 2022-03-07

## 2022-03-07 RX ORDER — HEPARIN 100 UNIT/ML
500 SYRINGE INTRAVENOUS
Status: CANCELLED | OUTPATIENT
Start: 2022-03-11

## 2022-03-07 RX ORDER — SODIUM CHLORIDE 0.9 % (FLUSH) 0.9 %
10 SYRINGE (ML) INJECTION
Status: CANCELLED | OUTPATIENT
Start: 2022-03-11

## 2022-03-07 RX ORDER — SODIUM CHLORIDE 0.9 % (FLUSH) 0.9 %
10 SYRINGE (ML) INJECTION
Status: CANCELLED | OUTPATIENT
Start: 2022-03-07

## 2022-03-07 RX ORDER — DIPHENHYDRAMINE HYDROCHLORIDE 50 MG/ML
50 INJECTION INTRAMUSCULAR; INTRAVENOUS ONCE AS NEEDED
Status: CANCELLED | OUTPATIENT
Start: 2022-03-07

## 2022-03-07 RX ORDER — EPINEPHRINE 0.3 MG/.3ML
0.3 INJECTION SUBCUTANEOUS ONCE AS NEEDED
Status: DISCONTINUED | OUTPATIENT
Start: 2022-03-07 | End: 2022-03-07 | Stop reason: HOSPADM

## 2022-03-07 RX ORDER — SODIUM CHLORIDE 0.9 % (FLUSH) 0.9 %
10 SYRINGE (ML) INJECTION
Status: DISCONTINUED | OUTPATIENT
Start: 2022-03-07 | End: 2022-03-07 | Stop reason: HOSPADM

## 2022-03-07 RX ADMIN — FLUOROURACIL 2260 MG: 50 INJECTION, SOLUTION INTRAVENOUS at 11:03

## 2022-03-07 RX ADMIN — SODIUM CHLORIDE: 0.9 INJECTION, SOLUTION INTRAVENOUS at 10:03

## 2022-03-07 RX ADMIN — ONDANSETRON 8 MG: 2 INJECTION INTRAMUSCULAR; INTRAVENOUS at 11:03

## 2022-03-07 NOTE — PROGRESS NOTES
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 due to comorbidities.   EGD report reviewed. Scar in the gastric antrum. Biopsied.                          - Congested, nodular and ulcerated mucosa in the antrum. Biopsied. Treated with argon plasma coagulation (APC).    Path negative for cancer.   Pre op chemo is not curative. Further treatment options discussed with pt. Observation vs curative chemo radiation. She prefers definitive curative treatment.   Pt had complication with multidrug regimen. Will plan for single agent 4FU with radiation. Pt is unable to do 5 days pump as she doesn't have transportation over the weekend and she doesn't want to keep the pump throughout the week. We discussed folfox every other week with 4 days of chemo  vs 4 days of 5FU single agent (instead of 5 days) - she prefers to do 4 days of chemo understanding that it would not be standard.   Labs reviewed, adequate , proceed with 4th week of chemo XRT.           DVT right lower extremity  -eliquis prescribed  -duration - atleast as long as she has cancer, may need to be on it lifelong due to obesity and limited movement due to DJD  -tolerating blood thinners without any issues with bleeding      Mild anemia-   Monitor hb, s/p 2 doses of injectafer with improvement     Immunodeficiency - monitor   UTD covid vaccination     GERD  -on ppi , per  pcp    HTN/ Obesity/hyperlipidemia- BP controlled, on meds, per pcp   High protein , low calorie diet advised  CHF- compensated on exam today, pt has echo stress test which was abnormal jan 2021, followed by C in Feb 2021 which showed clean coronaries.   EKG 2/2021- sinus rhythm   Per cardiology    Problem List Items Addressed This Visit     Class 3 severe obesity due to excess calories with serious comorbidity and body mass index (BMI) of 50.0 to 59.9 in adult    Current mild episode of major depressive disorder without prior episode    Essential hypertension    Gastric adenocarcinoma    Overview     gastric cancer cT2 or higher           Left knee DJD    Chronic diastolic congestive heart failure - Primary (Chronic)    History of DVT (deep vein thrombosis) (Chronic)        Route Chart for Scheduling    Med Onc Chart Routing      Follow up with physician 1 week.   Follow up with F[facility]    Labs CBC, CMP and magnesium   Lab interval:     Imaging None      Pharmacy appointment No pharmacy appointment needed      Other referrals No additional referrals needed         Treatment Plan Information   OP FLUOROURACIL (5 DAY) QW + XRT   Cathy Pelaez MD   Upcoming Treatment Dates - OP FLUOROURACIL (5 DAY) QW + XRT    3/6/2022       Chemotherapy       fluorouracil (ADRUCIL) 225 mg/m2/day = 2,260 mg in sodium chloride 0.9% chemo infusion  3/13/2022       Chemotherapy       fluorouracil (ADRUCIL) 225 mg/m2/day = 2,260 mg in sodium chloride 0.9% chemo infusion  3/20/2022       Chemotherapy       fluorouracil (ADRUCIL) 225 mg/m2/day = 2,260 mg in sodium chloride 0.9% chemo infusion  3/27/2022       Chemotherapy       fluorouracil (ADRUCIL) 225 mg/m2/day = 2,260 mg in sodium chloride 0.9% chemo infusion    Therapy Plan Information  INJECTAFER (FERRIC CARBOXYMALTOSE)  EPINEPHrine (EPIPEN) 0.3 mg/0.3 mL pen injection 0.3 mg  0.3 mg, Intramuscular, PRN  diphenhydrAMINE injection 50 mg  50 mg, Intravenous, PRN  methylPREDNISolone sodium  succinate injection 125 mg  125 mg, Intravenous, PRN  sodium chloride 0.9% bolus 1,000 mL  1,000 mL, Intravenous, PRN    PORT FLUSH  Flushes  heparin, porcine (PF) 100 unit/mL injection flush 500 Units  500 Units, Intravenous, Every visit  sodium chloride 0.9% flush 10 mL  10 mL, Intravenous, Every visit    Electronically signed by Cathy Pelaez    [facility]      Future Appointments   Date Time Provider Department Center   3/7/2022 11:00 AM CHEMO 06 [facility] CHEMO [facility]   3/11/2022  1:00 PM INJECTION, [facility] CHEMO [facility] CHEMO [facility]   3/14/2022  9:30 AM LAB, SAME DAY [facility] LABDRAW [facility]   3/14/2022 10:30 AM Cathy Pelaez MD [facility] HEM ONC [facility]   3/14/2022 11:00 AM CHEMO 05 [facility] CHEMO [facility]   3/18/2022  1:00 PM INJECTION, [facility] CHEMO [facility] CHEMO [facility]   3/21/2022 10:00 AM Savanna Hyatt NP [facility] PAINMGT [facility]           This note was created with voice recognition software.  Grammatical, syntax and spelling errors may be inevitable.

## 2022-03-07 NOTE — PLAN OF CARE
Home Chemo infusion pump started after premed complete. Pt to return 3/11/22 for DC of pump.   Pt verbalized understanding of discharge instructions before leaving.

## 2022-03-08 ENCOUNTER — TELEPHONE (OUTPATIENT)
Dept: HEMATOLOGY/ONCOLOGY | Facility: CLINIC | Age: 72
End: 2022-03-08
Payer: MEDICARE

## 2022-03-08 PROCEDURE — 77014 PR  CT GUIDANCE PLACEMENT RAD THERAPY FIELDS: CPT | Mod: 26,,, | Performed by: STUDENT IN AN ORGANIZED HEALTH CARE EDUCATION/TRAINING PROGRAM

## 2022-03-08 PROCEDURE — 77386 HC IMRT, COMPLEX: CPT | Performed by: STUDENT IN AN ORGANIZED HEALTH CARE EDUCATION/TRAINING PROGRAM

## 2022-03-08 PROCEDURE — 77336 RADIATION PHYSICS CONSULT: CPT | Performed by: STUDENT IN AN ORGANIZED HEALTH CARE EDUCATION/TRAINING PROGRAM

## 2022-03-08 PROCEDURE — 77014 PR  CT GUIDANCE PLACEMENT RAD THERAPY FIELDS: ICD-10-PCS | Mod: 26,,, | Performed by: STUDENT IN AN ORGANIZED HEALTH CARE EDUCATION/TRAINING PROGRAM

## 2022-03-08 PROCEDURE — 77014 HC CT GUIDANCE RADIATION THERAPY FLDS PLACEMENT: CPT | Mod: TC | Performed by: STUDENT IN AN ORGANIZED HEALTH CARE EDUCATION/TRAINING PROGRAM

## 2022-03-08 NOTE — TELEPHONE ENCOUNTER
Returned call. States patient has refused all nursing visit since 2/17. They will need an discharge order to discontinue the nursing part. I told her that I will notify Dr. Pelaez.      ----- Message from Breanne Conn sent at 3/8/2022  2:48 PM CST -----  Who called?:Mary Ann (RN Case manager)      What is the request in detail: STATES THAT SHE NEEDS A ORDER TO DISCHARGE THE PT.        Can the clinic reply by [facility]?:NO        Best Call Back Number:[phone]

## 2022-03-08 NOTE — TELEPHONE ENCOUNTER
Spoke with Dr. Pelaez Ok to discharge her from Home Health. Called [facility] and left a message with Mary Ann.    ----- Message from Breanne Conn sent at 3/8/2022  2:48 PM CST -----  Who called?:Mary Ann (RN Case manager)      What is the request in detail: STATES THAT SHE NEEDS A ORDER TO DISCHARGE THE PT.        Can the clinic reply by [facility]?:NO        Best Call Back Number:[phone]

## 2022-03-11 ENCOUNTER — INFUSION (OUTPATIENT)
Dept: INFUSION THERAPY | Facility: OTHER | Age: 72
End: 2022-03-11
Attending: INTERNAL MEDICINE
Payer: MEDICAID

## 2022-03-11 VITALS
DIASTOLIC BLOOD PRESSURE: 59 MMHG | SYSTOLIC BLOOD PRESSURE: 123 MMHG | RESPIRATION RATE: 16 BRPM | TEMPERATURE: 98 F | HEART RATE: 75 BPM | OXYGEN SATURATION: 96 %

## 2022-03-11 DIAGNOSIS — C16.9 GASTRIC ADENOCARCINOMA: Primary | ICD-10-CM

## 2022-03-11 PROCEDURE — 63600175 PHARM REV CODE 636 W HCPCS: Performed by: STUDENT IN AN ORGANIZED HEALTH CARE EDUCATION/TRAINING PROGRAM

## 2022-03-11 PROCEDURE — 77014 HC CT GUIDANCE RADIATION THERAPY FLDS PLACEMENT: CPT | Mod: TC | Performed by: STUDENT IN AN ORGANIZED HEALTH CARE EDUCATION/TRAINING PROGRAM

## 2022-03-11 PROCEDURE — 77014 PR  CT GUIDANCE PLACEMENT RAD THERAPY FIELDS: ICD-10-PCS | Mod: 26,,, | Performed by: STUDENT IN AN ORGANIZED HEALTH CARE EDUCATION/TRAINING PROGRAM

## 2022-03-11 PROCEDURE — 77386 HC IMRT, COMPLEX: CPT | Performed by: STUDENT IN AN ORGANIZED HEALTH CARE EDUCATION/TRAINING PROGRAM

## 2022-03-11 PROCEDURE — A4216 STERILE WATER/SALINE, 10 ML: HCPCS | Performed by: STUDENT IN AN ORGANIZED HEALTH CARE EDUCATION/TRAINING PROGRAM

## 2022-03-11 PROCEDURE — 25000003 PHARM REV CODE 250: Performed by: STUDENT IN AN ORGANIZED HEALTH CARE EDUCATION/TRAINING PROGRAM

## 2022-03-11 PROCEDURE — 77014 PR  CT GUIDANCE PLACEMENT RAD THERAPY FIELDS: CPT | Mod: 26,,, | Performed by: STUDENT IN AN ORGANIZED HEALTH CARE EDUCATION/TRAINING PROGRAM

## 2022-03-11 RX ORDER — HEPARIN 100 UNIT/ML
500 SYRINGE INTRAVENOUS
Status: DISCONTINUED | OUTPATIENT
Start: 2022-03-11 | End: 2022-03-11 | Stop reason: HOSPADM

## 2022-03-11 RX ORDER — AMLODIPINE BESYLATE 10 MG/1
TABLET ORAL
Qty: 90 TABLET | Refills: 1 | Status: SHIPPED | OUTPATIENT
Start: 2022-03-11 | End: 2022-05-31

## 2022-03-11 RX ORDER — LISINOPRIL 40 MG/1
TABLET ORAL
Qty: 90 TABLET | Refills: 1 | Status: SHIPPED | OUTPATIENT
Start: 2022-03-11 | End: 2022-05-31

## 2022-03-11 RX ORDER — SODIUM CHLORIDE 0.9 % (FLUSH) 0.9 %
10 SYRINGE (ML) INJECTION
Status: DISCONTINUED | OUTPATIENT
Start: 2022-03-11 | End: 2022-03-11 | Stop reason: HOSPADM

## 2022-03-11 RX ADMIN — Medication 10 ML: at 01:03

## 2022-03-11 RX ADMIN — HEPARIN 500 UNITS: 100 SYRINGE at 01:03

## 2022-03-11 NOTE — PROGRESS NOTES
Hematology- Oncology Clinic Note :      3/14/2022    RFV / chief complaint- Gastric Cancer, Follow-up, and Gastric adenocarcinoma        HPI  Pt is a 71 y.o. 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   Pt presents to the clinic today for gastric cancer    Doing well. Tolerating chemo and radiation well. Mild nausea resolved with anti emetics.   No bleeding reported.  Tolerating Eliquis without any issues.  Constipation resolved  Advised to quit smoking    Oncology presentation/ HPI  Pt was being evaluated for gastric bypass surgery   Feb 2021 EGD- Gastric tumor in the prepyloric region of the stomach,  March 2021 EUS- Gastric tumor in the prepyloric region of the stomach.  Both above biopsies did not show malignancy  5/25/21 EGD- Gastric tumor on the posterior wall of the gastric antrum. Complete removal was accomplished.         Reviewed past medical/surgical/social history    Past Medical History:   Diagnosis Date    YOUNG (acute kidney injury) 10/15/2021    Anemia     2018    Arthritis     BMI 60.0-69.9, adult     Cataract     Chronic diastolic congestive heart failure 1/27/2021    Depression     Dry eye syndrome     Gastric adenocarcinoma 5/25/2021    GERD (gastroesophageal reflux disease)     Glaucoma suspect     History of gastric ulcer     Hyperlipidemia     Hypertension     Hypothyroidism     Morbid obesity     Neuromuscular disorder     Sleep apnea     Thyroid disease       Past Surgical History:   Procedure Laterality Date    APPENDECTOMY      CARDIAC SURGERY      CATARACT  EXTRACTION W/  INTRAOCULAR LENS IMPLANT Bilateral     MFIOL OU    CORONARY ANGIOGRAPHY Bilateral 2/24/2021    Procedure: ANGIOGRAM, CORONARY ARTERY;  Surgeon: Cresencio Ridley MD;  Location: [facility] CATH LAB;  Service: Cardiology;  Laterality: Bilateral;  Covid test needed - ok per Danay SSCU. Pt. w/o transportation or family    ENDOSCOPIC ULTRASOUND OF UPPER GASTROINTESTINAL TRACT N/A 3/17/2021    Procedure: ULTRASOUND, UPPER GI TRACT, ENDOSCOPIC;  Surgeon: Gerald Anaya MD;  Location: [facility] SAM (2ND FLR);  Service: Endoscopy;  Laterality: N/A;  Pt unable to get a ride for swab. Will do rapid test at 8:30 - ttr    ENDOSCOPIC ULTRASOUND OF UPPER GASTROINTESTINAL TRACT N/A 1/13/2022    Procedure: ULTRASOUND, UPPER GI TRACT, ENDOSCOPIC;  Surgeon: Kevin Carballo MD;  Location: [facility] ENDO (2ND FLR);  Service: Endoscopy;  Laterality: N/A;  blood thinner approval received, see telephone encounter 1/7/22-BB  rapid  instructions given verbally and sent to address on file in pt's chart-BB    ESOPHAGOGASTRODUODENOSCOPY N/A 2/5/2021    Procedure: EGD (ESOPHAGOGASTRODUODENOSCOPY);  Surgeon: Matthew Hernadez MD;  Location: [facility] SAM (2ND FLR);  Service: Endoscopy;  Laterality: N/A;  BMI-58    Wt: 361#     COVID test at [facility] on 2/2-GT    ESOPHAGOGASTRODUODENOSCOPY N/A 3/17/2021    Procedure: EGD (ESOPHAGOGASTRODUODENOSCOPY);  Surgeon: Gerald Anaya MD;  Location: [facility] SAM (2ND FLR);  Service: Endoscopy;  Laterality: N/A;    ESOPHAGOGASTRODUODENOSCOPY N/A 5/25/2021    Procedure: EGD (ESOPHAGOGASTRODUODENOSCOPY);  Surgeon: Kevin Carballo MD;  Location: [facility] ENDO (2ND FLR);  Service: Endoscopy;  Laterality: N/A;  ESD 2 hours  Full COVID vaccination on file. ttr    ESOPHAGOGASTRODUODENOSCOPY N/A 1/13/2022    Procedure: EGD (ESOPHAGOGASTRODUODENOSCOPY);  Surgeon: Kevin Carballo MD;  Location: [facility] (2ND FLR);  Service: Endoscopy;  Laterality: N/A;  blood thinner approval, see telephone encounter  22-BB  rapid  instructions given verbally and sent to address on file in pt's chart-BB    EYE SURGERY      INJECTION OF ANESTHETIC AGENT AROUND NERVE Left 7/10/2018    Procedure: BLOCK, NERVE;  Surgeon: Samantha Vidal MD;  Location: [facility] PAIN MGT;  Service: Pain Management;  Laterality: Left;  Genicular     INJECTION OF ANESTHETIC AGENT AROUND NERVE Left 2019    Procedure: Block, Nerve NERVE BLOCK GENICULAR WITH PHENOL 6%;  Surgeon: Samantha Vidal MD;  Location: [facility] PAIN MGT;  Service: Pain Management;  Laterality: Left;  NEEDS CONSENT    INSERTION OF TUNNELED CENTRAL VENOUS CATHETER (CVC) WITH SUBCUTANEOUS PORT N/A 2021    Procedure: INSERTION, PORT-A-CATH;  Surgeon: Mario Paz MD;  Location: [facility] CATH LAB;  Service: Radiology;  Laterality: N/A;  PORT PLACEMENT    RADIOFREQUENCY ABLATION Left 2020    Procedure: RADIOFREQUENCY ABLATION LEFT GENICULAR;  Surgeon: Tevin Clark MD;  Location: [facility] PAIN MGT;  Service: Pain Management;  Laterality: Left;  Left Genicular RFA    RADIOFREQUENCY ABLATION Left 2021    Procedure: RADIOFREQUENCY ABLATION LEFT GENICULAR;  Surgeon: Tevin Clark MD;  Location: [facility] PAIN MGT;  Service: Pain Management;  Laterality: Left;    RADIOFREQUENCY ABLATION Left 2021    Procedure: RADIOFREQUENCY ABLATION, GENICULAR COOLED NEED CONSENT;  Surgeon: Tevin Clark MD;  Location: [facility] PAIN MGT;  Service: Pain Management;  Laterality: Left;    TONSILLECTOMY        (Not in a hospital admission)    Review of patient's allergies indicates:  No Known Allergies   Social History     Tobacco Use    Smoking status: Current Some Day Smoker     Packs/day: 0.25     Years: 50.00     Pack years: 12.50     Types: Cigarettes     Last attempt to quit: 2020     Years since quittin.1    Smokeless tobacco: Never Used   Substance Use Topics    Alcohol use: Yes     Comment: raely      Family History   Problem Relation Age of Onset    No Known Problems  "Mother     No Known Problems Father     Colon cancer Neg Hx     Esophageal cancer Neg Hx           Review of Systems :  Review of Systems   Constitutional: Positive for malaise/fatigue. Negative for chills, diaphoresis, fever and weight loss.   HENT: Negative.  Negative for congestion, hearing loss, nosebleeds, sore throat and tinnitus.    Eyes: Negative.  Negative for blurred vision and discharge.   Respiratory: Negative for cough, hemoptysis, sputum production, shortness of breath and wheezing.    Cardiovascular: Negative.  Negative for chest pain, palpitations and leg swelling.   Gastrointestinal: Positive for nausea. Negative for abdominal pain, blood in stool, constipation, diarrhea, heartburn, melena and vomiting.   Genitourinary: Negative.    Musculoskeletal: Positive for joint pain. Negative for back pain, falls and myalgias.   Skin: Negative for itching and rash.   Neurological: Negative for dizziness, tingling, sensory change, speech change, focal weakness, seizures, loss of consciousness, weakness and headaches.   Endo/Heme/Allergies: Negative.  Does not bruise/bleed easily.   Psychiatric/Behavioral: Negative.  Negative for depression. The patient is not nervous/anxious and does not have insomnia.                Physical Exam :  /64 (BP Location: Right arm, Patient Position: Sitting, BP Method: Medium (Automatic))   Pulse 70   Temp 98 °F (36.7 °C) (Oral)   Resp 16   Ht 5' 6" (1.676 m)   Wt 134.3 kg (296 lb 1.2 oz)   SpO2 98%   BMI 47.79 kg/m²   Wt Readings from Last 3 Encounters:   03/14/22 134.3 kg (296 lb 1.2 oz)   03/07/22 135.1 kg (297 lb 13.5 oz)   02/28/22 (!) 138.7 kg (305 lb 12.5 oz)       Body mass index is 47.79 kg/m².      Physical Exam  Vitals and nursing note reviewed.   Constitutional:       General: She is not in acute distress.     Appearance: She is well-developed.   HENT:      Head: Normocephalic and atraumatic.      Right Ear: External ear normal.      Left Ear: External " ear normal.      Mouth/Throat:      Pharynx: No oropharyngeal exudate.   Eyes:      General: No scleral icterus.     Conjunctiva/sclera: Conjunctivae normal.   Neck:      Thyroid: No thyromegaly.      Trachea: No tracheal deviation.   Cardiovascular:      Rate and Rhythm: Normal rate and regular rhythm.      Heart sounds: Normal heart sounds. No murmur heard.  Pulmonary:      Effort: Pulmonary effort is normal. No respiratory distress.      Breath sounds: Normal breath sounds. No wheezing or rales.      Comments: Port SOI  Chest:   Breasts:      Right: No swelling, nipple discharge, skin change or tenderness.       Abdominal:      General: Bowel sounds are normal. There is no distension (obese).      Palpations: Abdomen is soft. There is no mass.      Tenderness: There is no abdominal tenderness. There is no rebound.   Musculoskeletal:         General: Swelling present. No tenderness. Normal range of motion.      Cervical back: Normal range of motion and neck supple.   Lymphadenopathy:      Cervical: No cervical adenopathy.   Skin:     General: Skin is warm.      Findings: No erythema, rash or wound.   Neurological:      Mental Status: She is alert and oriented to person, place, and time.      Cranial Nerves: No cranial nerve deficit.      Gait: Gait abnormal (uses cane).   Psychiatric:         Behavior: Behavior normal.             Current Outpatient Medications   Medication Sig Dispense Refill    acetaminophen (TYLENOL) 500 MG tablet       amLODIPine (NORVASC) 10 MG tablet TAKE 1 TABLET ONE TIME DAILY 90 tablet 1    atorvastatin (LIPITOR) 20 MG tablet TAKE 1 TABLET ONE TIME DAILY 90 tablet 1    B-complex with vitamin C (Z-BEC OR EQUIV) tablet Take 1 tablet by mouth once daily.       CALCIUM CITRATE-VITAMIN D2 ORAL Take 1 tablet by mouth once daily.       citalopram (CELEXA) 10 MG tablet Take 1 tablet (10 mg total) by mouth once daily. 90 tablet 3    ELIQUIS 5 mg Tab TAKE 1 TABLET TWICE DAILY 180 tablet 2     ferrous gluconate (FERGON) 324 MG tablet Take 1 tablet (324 mg total) by mouth daily with breakfast. 90 tablet 11    furosemide (LASIX) 80 MG tablet Take 1 tablet (80 mg total) by mouth 2 (two) times a day. 60 tablet 11    gabapentin (NEURONTIN) 300 MG capsule Take 2 capsules (600 mg total) by mouth 3 (three) times daily. 540 capsule 2    lactulose (CHRONULAC) 10 gram/15 mL solution Take 30 mLs (20 g total) by mouth 2 (two) times daily as needed (constipation). 120 mL 1    LIDOcaine-prilocaine (EMLA) cream Apply topically as needed (prior to port access). 30 g 0    lisinopriL (PRINIVIL,ZESTRIL) 40 MG tablet TAKE 1 TABLET ONE TIME DAILY 90 tablet 1    multivitamin with minerals tablet Take 1 tablet by mouth once daily.       oxyCODONE-acetaminophen (PERCOCET) 5-325 mg per tablet Take 1 tablet by mouth every 8 (eight) hours as needed for Pain. 90 tablet 0    pantoprazole (PROTONIX) 40 MG tablet Take 1 tablet (40 mg total) by mouth Daily. 90 tablet 1    polyethylene glycol (GLYCOLAX) 17 gram/dose powder Take 17 g by mouth once daily. 510 g 1    semaglutide (OZEMPIC) 1 mg/dose (2 mg/1.5 mL) PnIj Inject 1 mg into the skin every 7 days. 2 pen 3     No current facility-administered medications for this visit.     Facility-Administered Medications Ordered in Other Visits   Medication Dose Route Frequency Provider Last Rate Last Admin    0.9%  NaCl infusion   Intravenous Continuous Tevin Clark MD   Stopped at 01/13/22 1055    alteplase injection 2 mg  2 mg Intra-Catheter PRN Cathy Pelaez MD        diphenhydrAMINE injection 50 mg  50 mg Intravenous Once PRN Cathy Pelaez MD        EPINEPHrine (EPIPEN) 0.3 mg/0.3 mL pen injection 0.3 mg  0.3 mg Intramuscular Once PRN Cathy Pelaez MD        fluorouracil (ADRUCIL) 225 mg/m2/day = 2,250 mg in sodium chloride 0.9% chemo infusion  225 mg/m2/day Intravenous over 96 hr Cathy Pelaez MD        heparin, porcine (PF) 100 unit/mL injection flush 500 Units  500  Units Intravenous PRN Cathy Pelaez MD        hydrocortisone sodium succinate injection 100 mg  100 mg Intravenous Once PRN Cathy Pelaez MD        ondansetron injection 8 mg  8 mg Intravenous 1 time in Clinic/HOD Cathy Pelaez MD        sodium chloride 0.9% 250 mL flush bag   Intravenous 1 time in Clinic/HOD Cathy Pelaez MD        sodium chloride 0.9% flush 10 mL  10 mL Intravenous PRN Cathy Pelaez MD           Pertinent Diagnostic studies:      Lab Visit on 03/14/2022   Component Date Value Ref Range Status    Magnesium 03/14/2022 2.0  1.6 - 2.6 mg/dL Final    WBC 03/14/2022 8.20  3.90 - 12.70 K/uL Final    RBC 03/14/2022 4.29  4.00 - 5.40 M/uL Final    Hemoglobin 03/14/2022 14.1  12.0 - 16.0 g/dL Final    Hematocrit 03/14/2022 43.0  37.0 - 48.5 % Final    MCV 03/14/2022 100 (A) 82 - 98 fL Final    MCH 03/14/2022 32.9 (A) 27.0 - 31.0 pg Final    MCHC 03/14/2022 32.8  32.0 - 36.0 g/dL Final    RDW 03/14/2022 13.3  11.5 - 14.5 % Final    Platelets 03/14/2022 173  150 - 450 K/uL Final    MPV 03/14/2022 10.6  9.2 - 12.9 fL Final    Gran # (ANC) 03/14/2022 7.0  1.8 - 7.7 K/uL Final    Comment: The ANC is based on a white cell differential from an   automated cell counter. It has not been microscopically   reviewed for the presence of abnormal cells. Clinical   correlation is required.      Immature Grans (Abs) 03/14/2022 0.04  0.00 - 0.04 K/uL Final    Comment: Mild elevation in immature granulocytes is non specific and   can be seen in a variety of conditions including stress response,   acute inflammation, trauma and pregnancy. Correlation with other   laboratory and clinical findings is essential.      Sodium 03/14/2022 141  136 - 145 mmol/L Final    Potassium 03/14/2022 3.7  3.5 - 5.1 mmol/L Final    Chloride 03/14/2022 106  95 - 110 mmol/L Final    CO2 03/14/2022 25  23 - 29 mmol/L Final    Glucose 03/14/2022 90  70 - 110 mg/dL Final    BUN 03/14/2022 10  8 - 23 mg/dL Final    Creatinine  03/14/2022 0.8  0.5 - 1.4 mg/dL Final    Calcium 03/14/2022 9.8  8.7 - 10.5 mg/dL Final    Total Protein 03/14/2022 7.2  6.0 - 8.4 g/dL Final    Albumin 03/14/2022 3.6  3.5 - 5.2 g/dL Final    Total Bilirubin 03/14/2022 0.7  0.1 - 1.0 mg/dL Final    Comment: For infants and newborns, interpretation of results should be based  on gestational age, weight and in agreement with clinical  observations.    Premature Infant recommended reference ranges:  Up to 24 hours.............<8.0 mg/dL  Up to 48 hours............<12.0 mg/dL  3-5 days..................<15.0 mg/dL  6-29 days.................<15.0 mg/dL      Alkaline Phosphatase 03/14/2022 107  55 - 135 U/L Final    AST 03/14/2022 28  10 - 40 U/L Final    ALT 03/14/2022 16  10 - 44 U/L Final    Anion Gap 03/14/2022 10  8 - 16 mmol/L Final    eGFR if African American 03/14/2022 >60  >60 mL/min/1.73 m^2 Final    eGFR if non African American 03/14/2022 >60  >60 mL/min/1.73 m^2 Final    Comment: Calculation used to obtain the estimated glomerular filtration  rate (eGFR) is the CKD-EPI equation.            Patient Active Problem List    Diagnosis Date Noted    Gastric adenocarcinoma 05/25/2021    Chronic diastolic congestive heart failure 01/27/2021    Class 3 severe obesity due to excess calories with serious comorbidity and body mass index (BMI) of 50.0 to 59.9 in adult 04/29/2019    Essential hypertension 04/29/2019    Left knee DJD 12/21/2018    Current mild episode of major depressive disorder without prior episode 03/07/2022    History of DVT (deep vein thrombosis) 10/15/2021    Chronic pain 07/30/2021    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    Abnormal cardiovascular stress test 02/24/2021    Tobacco abuse 01/28/2021    Pure hypercholesterolemia 01/27/2021    Gastroesophageal reflux disease 03/22/2020    Osteopenia of neck of left femur 01/14/2020    Left knee pain 07/19/2019    Hypothyroidism 07/18/2019     Debility     At high risk for injury related to fall     Gait instability 07/15/2019    Major depressive disorder 04/29/2019    Joint pain 04/29/2019    Chronic knee pain 11/30/2018    Obstructive sleep apnea 08/09/2018    Primary osteoarthritis of left knee 07/10/2018     Active Problem List with Overview Notes    Diagnosis Date Noted    Gastric adenocarcinoma 05/25/2021     gastric cancer cT2 or higher      Chronic diastolic congestive heart failure 01/27/2021    Class 3 severe obesity due to excess calories with serious comorbidity and body mass index (BMI) of 50.0 to 59.9 in adult 04/29/2019    Essential hypertension 04/29/2019    Left knee DJD 12/21/2018    Current mild episode of major depressive disorder without prior episode 03/07/2022    History of DVT (deep vein thrombosis) 10/15/2021    Chronic pain 07/30/2021    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    Abnormal cardiovascular stress test 02/24/2021    Tobacco abuse 01/28/2021    Pure hypercholesterolemia 01/27/2021    Gastroesophageal reflux disease 03/22/2020    Osteopenia of neck of left femur 01/14/2020    Left knee pain 07/19/2019    Hypothyroidism 07/18/2019    Debility     At high risk for injury related to fall     Gait instability 07/15/2019    Major depressive disorder 04/29/2019    Joint pain 04/29/2019    Chronic knee pain 11/30/2018    Obstructive sleep apnea 08/09/2018    Primary osteoarthritis of left knee 07/10/2018         Oncology History   Gastric adenocarcinoma   2/5/2021 Procedure    EGD  Feb 2021 EGD- Gastric tumor in the prepyloric region of the stomach,     3/17/2021 Procedure    EUS  Impression:           - Gastric tumor in the prepyloric region of the                         stomach. Biopsied. Lesion appears amenable to                         endoscopic resection (ESD) - Dr. Carballo was present                         to view the lesion.      5/25/2021 Initial Diagnosis     Gastric adenocarcinoma     5/25/2021 Pathology Significant Finding    Adenocarcinoma, moderate to poorly differentiated   Tumor invades deep layers of submuocsa with deep margin extensively positive   Deep margin is extensively positive   Lateral margins are negative for neoplasia or malignancy      5/25/2021 Cancer Staged    Staging form: Stomach, AJCC 8th Edition  - Pathologic stage from 5/25/2021: Stage Unknown (pT1, pNX, cM0)     6/18/2021 Imaging Significant Findings    CT CAP   Asymmetric gastric wall thickening at the level of the gastric antrum presumably representing the patient's gastric adenocarcinoma.  I see no CT evidence for metastatic disease.     Low-density focus lower pole left kidney does not meet criteria for a simple cyst.  Findings can be further evaluated with ultrasound.  Additional subcentimeter low-density foci in the right kidney likely too small to characterize by imaging.     6/28/2021 Tumor Conference       [facility] UGI MULTIDISCIPLINARY TUMOR BOARD  PATIENT REVIEW FORM   ____________________________________________________________    CLINIC #: [id number]  DATE: 6/28/2021    DIAGNOSIS: gastric CA    PRESENTER: Latanya/ Donnie Sanders    PATIENT SUMMARY:   This 69 y/o female had incidental finding of gastric CA on EGD as part of bariatric evaluation given BMI 57. Underwent ESD per Dr. Carballo. Reviewed pathology - pT1b with LVI positive, extensive tumor at deep margin.     BOARD RECOMMENDATIONS:   Recommend perioperative chemotherapy, 4 cycles of FLOT    CONSULT NEEDED:     [] Surgery    [x] Hem/Onc    [] Rad/Onc    [] Dietary                 [] Social Service    [] Psychology       [] AES  [] Radiology     ESD Pathology Stage: Tumor 1b  Node(s) 0 Metastasis 0      GROUP STAGE:  [] O    [] 1A    [] IB    [] IIA    [] IIB     [] IIIA     [] IIIB     [] IIIC    []IV  [] Local recurrence     [] Regional recurrence     [] Distant recurrence   Metastatic site(s): none         [x]  [facility] Treatment Guidelines reviewed and care planned is consistent with guidelines.         (i.e., NCCN, NCI, PD, ACO, AUA, etc.)    PRESENTATION AT CANCER CONFERENCE:         [x] Prospective    [] Retrospective     [] Follow-Up       7/21/2021 - 9/30/2021 Chemotherapy    Treatment Summary   Plan Name: OP GASTRIC FLOT - FLUOROURACIL LEUCOVORIN OXALIPLATIN DOCETAXEL Q2W  Treatment Goal: Curative  Status: Inactive  Start Date: 7/21/2021  End Date: 9/30/2021  Provider: Cathy Pelaez MD  Chemotherapy: DOCEtaxeL (TAXOTERE) 50 mg/m2 = 135 mg in sodium chloride 0.9% 250 mL chemo infusion, 50 mg/m2 = 135 mg, Intravenous, Clinic/HOD 1 time, 4 of 4 cycles  Dose modification: 40 mg/m2 (original dose 50 mg/m2, Cycle 3)  Administration: 135 mg (7/21/2021), 135 mg (8/12/2021), 105 mg (9/15/2021), 105 mg (9/29/2021)  leucovorin calcium 200 mg/m2 = 545 mg in dextrose 5 % 250 mL infusion, 200 mg/m2 = 545 mg, Intravenous, Clinic/HOD 1 time, 4 of 4 cycles  Administration: 545 mg (7/21/2021), 535 mg (8/12/2021), 530 mg (9/15/2021), 525 mg (9/29/2021)  oxaliplatin (ELOXATIN) 85 mg/m2 = 232 mg in dextrose 5 % 500 mL chemo infusion, 85 mg/m2 = 232 mg, Intravenous, Clinic/HOD 1 time, 4 of 4 cycles  Administration: 232 mg (7/21/2021), 228 mg (8/12/2021), 225 mg (9/15/2021), 223 mg (9/29/2021)  fluorouraciL 7,000 mg in sodium chloride 0.9% 240 mL chemo infusion, 7,100 mg, Intravenous, Over 24 hours, 4 of 11 cycles  Dose modification: 2,000 mg/m2 (original dose 2,600 mg/m2, Cycle 3), 225 mg/m2/day (original dose 2,600 mg/m2, Cycle 5)  Administration: 7,000 mg (7/21/2021), 6,970 mg (8/12/2021), 5,300 mg (9/15/2021), 5,000 mg (9/29/2021)     9/16/2021 Imaging Significant Findings    Thrombosis of the right popliteal, posterior tibial, and peroneal veins.     11/15/2021 Imaging Significant Findings    Overall, no detrimental change compared to previous.  Persistent eccentric wall thickening at the level of the antrum in this patient with  provided history of gastric adenocarcinoma.  No metastatic disease.     Subcentimeter pulmonary nodules unchanged.  No new nodules.     Cholelithiasis     1/13/2022 Procedure    EUS  Impression:            - Normal esophagus.                          - Scar in the gastric antrum. Biopsied.                          - Congested, nodular and ulcerated mucosa in the                          antrum. Biopsied. Treated with argon plasma                          coagulation (APC).                          - Acquired deformity in the prepyloric region of                          the stomach.                          - Normal examined duodenum.                          - There was abnormal echogenicity in the                          visualized portion of the liver. This was                          hyperechoic and homogenous. This can be seen with                          fatty liver.                          - Hyperechoic material consistent with sludge was                          visualized endosonographically in the gallbladder.                          - There was dilation in the common bile duct which                          measured up to 10.8 mm.                          - Wall thickening was seen in the antrum of the                          stomach. This probable related to previous ESD.      2/14/2022 -  Chemotherapy    Treatment Summary   Plan Name: OP FLUOROURACIL (5 DAY) QW + XRT  Treatment Goal: Curative  Status: Active  Start Date: 2/14/2022  End Date: 4/1/2022 (Planned)  Provider: Cathy Pelaez MD  Chemotherapy: [No matching medication found in this treatment plan]       Assessment :       1. Gastric adenocarcinoma    2. Chronic diastolic congestive heart failure    3. Essential hypertension    4. History of DVT (deep vein thrombosis)    5. Primary osteoarthritis of left knee    6. Morbid obesity        Plan :           Gastric adenocarcinoma pT1b atleast , clinically T2   Found on work up for gastric  bypass surgery  EGD and EUS showed gastric tumor, biopsy neg for malignancy  Endoscopic resection- 5/25/21 Path Adenocarcinoma, moderate to poorly differentiated, Tumor invades deep layers of submuocsa with deep margin extensively positive. Tumor size 3.0 x 2.2 cm   CT CAP - no LN involvement or metastatic disease  Pt was discussed in UGI tumor board 6/28/2021 , plan for perioperative chemo followed by scans and surgery (Dr. Donnie Sanders )  Plan for FLOT four preoperative and four postoperative 2-week cycles. If pt is not able to tolerate triple drug regimen, change to folfox every 2 weeks.   C1 on 7/21/21, Tolerated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

## 2022-03-14 ENCOUNTER — INFUSION (OUTPATIENT)
Dept: INFUSION THERAPY | Facility: OTHER | Age: 72
End: 2022-03-14
Attending: STUDENT IN AN ORGANIZED HEALTH CARE EDUCATION/TRAINING PROGRAM
Payer: MEDICARE

## 2022-03-14 ENCOUNTER — OFFICE VISIT (OUTPATIENT)
Dept: HEMATOLOGY/ONCOLOGY | Facility: CLINIC | Age: 72
End: 2022-03-14
Payer: MEDICARE

## 2022-03-14 VITALS
TEMPERATURE: 98 F | HEART RATE: 70 BPM | OXYGEN SATURATION: 98 % | HEIGHT: 66 IN | BODY MASS INDEX: 47.09 KG/M2 | WEIGHT: 293 LBS | RESPIRATION RATE: 16 BRPM | SYSTOLIC BLOOD PRESSURE: 130 MMHG | DIASTOLIC BLOOD PRESSURE: 64 MMHG

## 2022-03-14 DIAGNOSIS — I50.32 CHRONIC DIASTOLIC CONGESTIVE HEART FAILURE: ICD-10-CM

## 2022-03-14 DIAGNOSIS — C16.9 GASTRIC ADENOCARCINOMA: Primary | ICD-10-CM

## 2022-03-14 DIAGNOSIS — M17.12 PRIMARY OSTEOARTHRITIS OF LEFT KNEE: ICD-10-CM

## 2022-03-14 DIAGNOSIS — E66.01 MORBID OBESITY: ICD-10-CM

## 2022-03-14 DIAGNOSIS — I10 ESSENTIAL HYPERTENSION: ICD-10-CM

## 2022-03-14 DIAGNOSIS — Z86.718 HISTORY OF DVT (DEEP VEIN THROMBOSIS): ICD-10-CM

## 2022-03-14 PROCEDURE — 25000003 PHARM REV CODE 250: Performed by: STUDENT IN AN ORGANIZED HEALTH CARE EDUCATION/TRAINING PROGRAM

## 2022-03-14 PROCEDURE — 1160F RVW MEDS BY RX/DR IN RCRD: CPT | Mod: CPTII,S$GLB,, | Performed by: STUDENT IN AN ORGANIZED HEALTH CARE EDUCATION/TRAINING PROGRAM

## 2022-03-14 PROCEDURE — 1160F PR REVIEW ALL MEDS BY PRESCRIBER/CLIN PHARMACIST DOCUMENTED: ICD-10-PCS | Mod: CPTII,S$GLB,, | Performed by: STUDENT IN AN ORGANIZED HEALTH CARE EDUCATION/TRAINING PROGRAM

## 2022-03-14 PROCEDURE — 3008F BODY MASS INDEX DOCD: CPT | Mod: CPTII,S$GLB,, | Performed by: STUDENT IN AN ORGANIZED HEALTH CARE EDUCATION/TRAINING PROGRAM

## 2022-03-14 PROCEDURE — 1159F MED LIST DOCD IN RCRD: CPT | Mod: CPTII,S$GLB,, | Performed by: STUDENT IN AN ORGANIZED HEALTH CARE EDUCATION/TRAINING PROGRAM

## 2022-03-14 PROCEDURE — 3078F PR MOST RECENT DIASTOLIC BLOOD PRESSURE < 80 MM HG: ICD-10-PCS | Mod: CPTII,S$GLB,, | Performed by: STUDENT IN AN ORGANIZED HEALTH CARE EDUCATION/TRAINING PROGRAM

## 2022-03-14 PROCEDURE — 1101F PR PT FALLS ASSESS DOC 0-1 FALLS W/OUT INJ PAST YR: ICD-10-PCS | Mod: CPTII,S$GLB,, | Performed by: STUDENT IN AN ORGANIZED HEALTH CARE EDUCATION/TRAINING PROGRAM

## 2022-03-14 PROCEDURE — 99215 PR OFFICE/OUTPT VISIT, EST, LEVL V, 40-54 MIN: ICD-10-PCS | Mod: S$GLB,,, | Performed by: STUDENT IN AN ORGANIZED HEALTH CARE EDUCATION/TRAINING PROGRAM

## 2022-03-14 PROCEDURE — 63600175 PHARM REV CODE 636 W HCPCS: Performed by: STUDENT IN AN ORGANIZED HEALTH CARE EDUCATION/TRAINING PROGRAM

## 2022-03-14 PROCEDURE — 77386 HC IMRT, COMPLEX: CPT | Performed by: STUDENT IN AN ORGANIZED HEALTH CARE EDUCATION/TRAINING PROGRAM

## 2022-03-14 PROCEDURE — 77014 PR  CT GUIDANCE PLACEMENT RAD THERAPY FIELDS: ICD-10-PCS | Mod: 26,,, | Performed by: STUDENT IN AN ORGANIZED HEALTH CARE EDUCATION/TRAINING PROGRAM

## 2022-03-14 PROCEDURE — 3288F PR FALLS RISK ASSESSMENT DOCUMENTED: ICD-10-PCS | Mod: CPTII,S$GLB,, | Performed by: STUDENT IN AN ORGANIZED HEALTH CARE EDUCATION/TRAINING PROGRAM

## 2022-03-14 PROCEDURE — 1126F AMNT PAIN NOTED NONE PRSNT: CPT | Mod: CPTII,S$GLB,, | Performed by: STUDENT IN AN ORGANIZED HEALTH CARE EDUCATION/TRAINING PROGRAM

## 2022-03-14 PROCEDURE — 4010F ACE/ARB THERAPY RXD/TAKEN: CPT | Mod: CPTII,S$GLB,, | Performed by: STUDENT IN AN ORGANIZED HEALTH CARE EDUCATION/TRAINING PROGRAM

## 2022-03-14 PROCEDURE — 77014 HC CT GUIDANCE RADIATION THERAPY FLDS PLACEMENT: CPT | Mod: TC | Performed by: STUDENT IN AN ORGANIZED HEALTH CARE EDUCATION/TRAINING PROGRAM

## 2022-03-14 PROCEDURE — 77014 PR  CT GUIDANCE PLACEMENT RAD THERAPY FIELDS: CPT | Mod: 26,,, | Performed by: STUDENT IN AN ORGANIZED HEALTH CARE EDUCATION/TRAINING PROGRAM

## 2022-03-14 PROCEDURE — 3075F PR MOST RECENT SYSTOLIC BLOOD PRESS GE 130-139MM HG: ICD-10-PCS | Mod: CPTII,S$GLB,, | Performed by: STUDENT IN AN ORGANIZED HEALTH CARE EDUCATION/TRAINING PROGRAM

## 2022-03-14 PROCEDURE — 1101F PT FALLS ASSESS-DOCD LE1/YR: CPT | Mod: CPTII,S$GLB,, | Performed by: STUDENT IN AN ORGANIZED HEALTH CARE EDUCATION/TRAINING PROGRAM

## 2022-03-14 PROCEDURE — 3075F SYST BP GE 130 - 139MM HG: CPT | Mod: CPTII,S$GLB,, | Performed by: STUDENT IN AN ORGANIZED HEALTH CARE EDUCATION/TRAINING PROGRAM

## 2022-03-14 PROCEDURE — 96374 THER/PROPH/DIAG INJ IV PUSH: CPT | Mod: 59

## 2022-03-14 PROCEDURE — 99215 OFFICE O/P EST HI 40 MIN: CPT | Mod: S$GLB,,, | Performed by: STUDENT IN AN ORGANIZED HEALTH CARE EDUCATION/TRAINING PROGRAM

## 2022-03-14 PROCEDURE — 96416 CHEMO PROLONG INFUSE W/PUMP: CPT

## 2022-03-14 PROCEDURE — 4010F PR ACE/ARB THEARPY RXD/TAKEN: ICD-10-PCS | Mod: CPTII,S$GLB,, | Performed by: STUDENT IN AN ORGANIZED HEALTH CARE EDUCATION/TRAINING PROGRAM

## 2022-03-14 PROCEDURE — 3008F PR BODY MASS INDEX (BMI) DOCUMENTED: ICD-10-PCS | Mod: CPTII,S$GLB,, | Performed by: STUDENT IN AN ORGANIZED HEALTH CARE EDUCATION/TRAINING PROGRAM

## 2022-03-14 PROCEDURE — 1159F PR MEDICATION LIST DOCUMENTED IN MEDICAL RECORD: ICD-10-PCS | Mod: CPTII,S$GLB,, | Performed by: STUDENT IN AN ORGANIZED HEALTH CARE EDUCATION/TRAINING PROGRAM

## 2022-03-14 PROCEDURE — 99999 PR PBB SHADOW E&M-EST. PATIENT-LVL IV: ICD-10-PCS | Mod: PBBFAC,,, | Performed by: STUDENT IN AN ORGANIZED HEALTH CARE EDUCATION/TRAINING PROGRAM

## 2022-03-14 PROCEDURE — 3288F FALL RISK ASSESSMENT DOCD: CPT | Mod: CPTII,S$GLB,, | Performed by: STUDENT IN AN ORGANIZED HEALTH CARE EDUCATION/TRAINING PROGRAM

## 2022-03-14 PROCEDURE — 1126F PR PAIN SEVERITY QUANTIFIED, NO PAIN PRESENT: ICD-10-PCS | Mod: CPTII,S$GLB,, | Performed by: STUDENT IN AN ORGANIZED HEALTH CARE EDUCATION/TRAINING PROGRAM

## 2022-03-14 PROCEDURE — 99999 PR PBB SHADOW E&M-EST. PATIENT-LVL IV: CPT | Mod: PBBFAC,,, | Performed by: STUDENT IN AN ORGANIZED HEALTH CARE EDUCATION/TRAINING PROGRAM

## 2022-03-14 PROCEDURE — 3078F DIAST BP <80 MM HG: CPT | Mod: CPTII,S$GLB,, | Performed by: STUDENT IN AN ORGANIZED HEALTH CARE EDUCATION/TRAINING PROGRAM

## 2022-03-14 RX ORDER — HEPARIN 100 UNIT/ML
500 SYRINGE INTRAVENOUS
Status: DISCONTINUED | OUTPATIENT
Start: 2022-03-14 | End: 2022-03-14 | Stop reason: HOSPADM

## 2022-03-14 RX ORDER — ONDANSETRON 2 MG/ML
8 INJECTION INTRAMUSCULAR; INTRAVENOUS
Status: CANCELLED | OUTPATIENT
Start: 2022-03-14

## 2022-03-14 RX ORDER — HEPARIN 100 UNIT/ML
500 SYRINGE INTRAVENOUS
Status: CANCELLED | OUTPATIENT
Start: 2022-03-18

## 2022-03-14 RX ORDER — EPINEPHRINE 0.3 MG/.3ML
0.3 INJECTION SUBCUTANEOUS ONCE AS NEEDED
Status: DISCONTINUED | OUTPATIENT
Start: 2022-03-14 | End: 2022-03-14 | Stop reason: HOSPADM

## 2022-03-14 RX ORDER — DIPHENHYDRAMINE HYDROCHLORIDE 50 MG/ML
50 INJECTION INTRAMUSCULAR; INTRAVENOUS ONCE AS NEEDED
Status: DISCONTINUED | OUTPATIENT
Start: 2022-03-14 | End: 2022-03-14 | Stop reason: HOSPADM

## 2022-03-14 RX ORDER — SODIUM CHLORIDE 0.9 % (FLUSH) 0.9 %
10 SYRINGE (ML) INJECTION
Status: CANCELLED | OUTPATIENT
Start: 2022-03-14

## 2022-03-14 RX ORDER — SODIUM CHLORIDE 0.9 % (FLUSH) 0.9 %
10 SYRINGE (ML) INJECTION
Status: DISCONTINUED | OUTPATIENT
Start: 2022-03-14 | End: 2022-03-14 | Stop reason: HOSPADM

## 2022-03-14 RX ORDER — HEPARIN 100 UNIT/ML
500 SYRINGE INTRAVENOUS
Status: CANCELLED | OUTPATIENT
Start: 2022-03-14

## 2022-03-14 RX ORDER — DIPHENHYDRAMINE HYDROCHLORIDE 50 MG/ML
50 INJECTION INTRAMUSCULAR; INTRAVENOUS ONCE AS NEEDED
Status: CANCELLED | OUTPATIENT
Start: 2022-03-14

## 2022-03-14 RX ORDER — ONDANSETRON 2 MG/ML
8 INJECTION INTRAMUSCULAR; INTRAVENOUS
Status: COMPLETED | OUTPATIENT
Start: 2022-03-14 | End: 2022-03-14

## 2022-03-14 RX ORDER — SODIUM CHLORIDE 0.9 % (FLUSH) 0.9 %
10 SYRINGE (ML) INJECTION
Status: CANCELLED | OUTPATIENT
Start: 2022-03-18

## 2022-03-14 RX ORDER — EPINEPHRINE 0.3 MG/.3ML
0.3 INJECTION SUBCUTANEOUS ONCE AS NEEDED
Status: CANCELLED | OUTPATIENT
Start: 2022-03-14

## 2022-03-14 RX ADMIN — ONDANSETRON HYDROCHLORIDE 8 MG: 2 SOLUTION INTRAMUSCULAR; INTRAVENOUS at 11:03

## 2022-03-14 RX ADMIN — FLUOROURACIL 2250 MG: 50 INJECTION, SOLUTION INTRAVENOUS at 11:03

## 2022-03-14 RX ADMIN — SODIUM CHLORIDE: 0.9 INJECTION, SOLUTION INTRAVENOUS at 11:03

## 2022-03-14 NOTE — PLAN OF CARE
How Severe Is It?: mild
Zofran IV push administered no reaction. Chemotherapy home infusion pump placed on patient. Patient verbalized understanding of chemotherapy home infusion pump instructions. Patient tolerated well. No apparent distress noted. Discharge instructions given to patient. Patient understands instructions. Follow up appointment scheduled.    
Is This A New Presentation, Or A Follow-Up?: Follow Up Isotretinoin

## 2022-03-15 PROCEDURE — 77386 HC IMRT, COMPLEX: CPT | Performed by: STUDENT IN AN ORGANIZED HEALTH CARE EDUCATION/TRAINING PROGRAM

## 2022-03-15 PROCEDURE — 77014 HC CT GUIDANCE RADIATION THERAPY FLDS PLACEMENT: CPT | Mod: TC | Performed by: STUDENT IN AN ORGANIZED HEALTH CARE EDUCATION/TRAINING PROGRAM

## 2022-03-15 PROCEDURE — 77014 PR  CT GUIDANCE PLACEMENT RAD THERAPY FIELDS: ICD-10-PCS | Mod: 26,,, | Performed by: STUDENT IN AN ORGANIZED HEALTH CARE EDUCATION/TRAINING PROGRAM

## 2022-03-15 PROCEDURE — 77014 PR  CT GUIDANCE PLACEMENT RAD THERAPY FIELDS: CPT | Mod: 26,,, | Performed by: STUDENT IN AN ORGANIZED HEALTH CARE EDUCATION/TRAINING PROGRAM

## 2022-03-16 PROCEDURE — 77014 HC CT GUIDANCE RADIATION THERAPY FLDS PLACEMENT: CPT | Mod: TC | Performed by: STUDENT IN AN ORGANIZED HEALTH CARE EDUCATION/TRAINING PROGRAM

## 2022-03-16 PROCEDURE — 77014 PR  CT GUIDANCE PLACEMENT RAD THERAPY FIELDS: CPT | Mod: 26,,, | Performed by: STUDENT IN AN ORGANIZED HEALTH CARE EDUCATION/TRAINING PROGRAM

## 2022-03-16 PROCEDURE — 77386 HC IMRT, COMPLEX: CPT | Performed by: STUDENT IN AN ORGANIZED HEALTH CARE EDUCATION/TRAINING PROGRAM

## 2022-03-16 PROCEDURE — 77014 PR  CT GUIDANCE PLACEMENT RAD THERAPY FIELDS: ICD-10-PCS | Mod: 26,,, | Performed by: STUDENT IN AN ORGANIZED HEALTH CARE EDUCATION/TRAINING PROGRAM

## 2022-03-17 ENCOUNTER — DOCUMENTATION ONLY (OUTPATIENT)
Dept: RADIATION ONCOLOGY | Facility: CLINIC | Age: 72
End: 2022-03-17
Payer: MEDICARE

## 2022-03-17 DIAGNOSIS — C16.9 GASTRIC ADENOCARCINOMA: Primary | ICD-10-CM

## 2022-03-17 PROCEDURE — 77014 PR  CT GUIDANCE PLACEMENT RAD THERAPY FIELDS: ICD-10-PCS | Mod: 26,,, | Performed by: STUDENT IN AN ORGANIZED HEALTH CARE EDUCATION/TRAINING PROGRAM

## 2022-03-17 PROCEDURE — 77336 RADIATION PHYSICS CONSULT: CPT | Performed by: STUDENT IN AN ORGANIZED HEALTH CARE EDUCATION/TRAINING PROGRAM

## 2022-03-17 PROCEDURE — 77386 HC IMRT, COMPLEX: CPT | Performed by: STUDENT IN AN ORGANIZED HEALTH CARE EDUCATION/TRAINING PROGRAM

## 2022-03-17 PROCEDURE — 77014 HC CT GUIDANCE RADIATION THERAPY FLDS PLACEMENT: CPT | Mod: TC | Performed by: STUDENT IN AN ORGANIZED HEALTH CARE EDUCATION/TRAINING PROGRAM

## 2022-03-17 PROCEDURE — 77014 PR  CT GUIDANCE PLACEMENT RAD THERAPY FIELDS: CPT | Mod: 26,,, | Performed by: STUDENT IN AN ORGANIZED HEALTH CARE EDUCATION/TRAINING PROGRAM

## 2022-03-17 NOTE — PLAN OF CARE
Pt. On day 20 of outpt. Xrt to stomach.  Pt. On miralz daily.  Taking zofran prn for occ. Nausea.  No v/d.  Will schedule CT/sim on Wed. 3/23.

## 2022-03-18 ENCOUNTER — INFUSION (OUTPATIENT)
Dept: INFUSION THERAPY | Facility: OTHER | Age: 72
End: 2022-03-18
Attending: STUDENT IN AN ORGANIZED HEALTH CARE EDUCATION/TRAINING PROGRAM
Payer: MEDICARE

## 2022-03-18 VITALS
HEART RATE: 87 BPM | SYSTOLIC BLOOD PRESSURE: 114 MMHG | DIASTOLIC BLOOD PRESSURE: 65 MMHG | RESPIRATION RATE: 18 BRPM | OXYGEN SATURATION: 97 % | TEMPERATURE: 98 F

## 2022-03-18 DIAGNOSIS — C80.1 CANCER: ICD-10-CM

## 2022-03-18 DIAGNOSIS — C16.9 GASTRIC ADENOCARCINOMA: Primary | ICD-10-CM

## 2022-03-18 PROCEDURE — 96374 THER/PROPH/DIAG INJ IV PUSH: CPT | Mod: 59

## 2022-03-18 PROCEDURE — A4216 STERILE WATER/SALINE, 10 ML: HCPCS | Performed by: STUDENT IN AN ORGANIZED HEALTH CARE EDUCATION/TRAINING PROGRAM

## 2022-03-18 PROCEDURE — 77386 HC IMRT, COMPLEX: CPT | Performed by: STUDENT IN AN ORGANIZED HEALTH CARE EDUCATION/TRAINING PROGRAM

## 2022-03-18 PROCEDURE — 77014 PR  CT GUIDANCE PLACEMENT RAD THERAPY FIELDS: CPT | Mod: 26,,, | Performed by: STUDENT IN AN ORGANIZED HEALTH CARE EDUCATION/TRAINING PROGRAM

## 2022-03-18 PROCEDURE — 25000003 PHARM REV CODE 250: Performed by: STUDENT IN AN ORGANIZED HEALTH CARE EDUCATION/TRAINING PROGRAM

## 2022-03-18 PROCEDURE — 77014 HC CT GUIDANCE RADIATION THERAPY FLDS PLACEMENT: CPT | Mod: TC | Performed by: STUDENT IN AN ORGANIZED HEALTH CARE EDUCATION/TRAINING PROGRAM

## 2022-03-18 PROCEDURE — 63600175 PHARM REV CODE 636 W HCPCS: Performed by: STUDENT IN AN ORGANIZED HEALTH CARE EDUCATION/TRAINING PROGRAM

## 2022-03-18 PROCEDURE — 77014 PR  CT GUIDANCE PLACEMENT RAD THERAPY FIELDS: ICD-10-PCS | Mod: 26,,, | Performed by: STUDENT IN AN ORGANIZED HEALTH CARE EDUCATION/TRAINING PROGRAM

## 2022-03-18 RX ORDER — HEPARIN 100 UNIT/ML
500 SYRINGE INTRAVENOUS
Status: DISCONTINUED | OUTPATIENT
Start: 2022-03-18 | End: 2022-03-18 | Stop reason: HOSPADM

## 2022-03-18 RX ORDER — ONDANSETRON 2 MG/ML
8 INJECTION INTRAMUSCULAR; INTRAVENOUS ONCE
Status: COMPLETED | OUTPATIENT
Start: 2022-03-18 | End: 2022-03-18

## 2022-03-18 RX ORDER — SODIUM CHLORIDE 0.9 % (FLUSH) 0.9 %
10 SYRINGE (ML) INJECTION
Status: DISCONTINUED | OUTPATIENT
Start: 2022-03-18 | End: 2022-03-18 | Stop reason: HOSPADM

## 2022-03-18 RX ADMIN — HEPARIN 500 UNITS: 100 SYRINGE at 12:03

## 2022-03-18 RX ADMIN — Medication 10 ML: at 12:03

## 2022-03-18 RX ADMIN — ONDANSETRON 8 MG: 2 INJECTION INTRAMUSCULAR; INTRAVENOUS at 12:03

## 2022-03-18 NOTE — PLAN OF CARE
Home infusion complete. Pt tolerated well. Pt complaining of nausea and dizziness. VSS. Dr. Pelaez to chairside. Zofran administered. Symptoms resolved within 20 min.  Infusion pump DC'd. Port to chest de-accessed after heparinized per protocol.  Pt verbalized understanding of discharge instructions.

## 2022-03-21 ENCOUNTER — OFFICE VISIT (OUTPATIENT)
Dept: PAIN MEDICINE | Facility: CLINIC | Age: 72
End: 2022-03-21
Payer: MEDICARE

## 2022-03-21 ENCOUNTER — INFUSION (OUTPATIENT)
Dept: INFUSION THERAPY | Facility: OTHER | Age: 72
End: 2022-03-21
Attending: STUDENT IN AN ORGANIZED HEALTH CARE EDUCATION/TRAINING PROGRAM
Payer: MEDICARE

## 2022-03-21 ENCOUNTER — OFFICE VISIT (OUTPATIENT)
Dept: HEMATOLOGY/ONCOLOGY | Facility: CLINIC | Age: 72
End: 2022-03-21
Payer: MEDICARE

## 2022-03-21 VITALS
BODY MASS INDEX: 47.05 KG/M2 | RESPIRATION RATE: 18 BRPM | HEART RATE: 74 BPM | OXYGEN SATURATION: 100 % | SYSTOLIC BLOOD PRESSURE: 108 MMHG | WEIGHT: 292.75 LBS | TEMPERATURE: 98 F | HEIGHT: 66 IN | DIASTOLIC BLOOD PRESSURE: 67 MMHG

## 2022-03-21 VITALS
TEMPERATURE: 98 F | DIASTOLIC BLOOD PRESSURE: 75 MMHG | BODY MASS INDEX: 47.79 KG/M2 | SYSTOLIC BLOOD PRESSURE: 122 MMHG | HEIGHT: 66 IN | HEART RATE: 60 BPM

## 2022-03-21 DIAGNOSIS — Z86.718 HISTORY OF DVT (DEEP VEIN THROMBOSIS): ICD-10-CM

## 2022-03-21 DIAGNOSIS — I10 ESSENTIAL HYPERTENSION: ICD-10-CM

## 2022-03-21 DIAGNOSIS — C16.9 GASTRIC ADENOCARCINOMA: Primary | ICD-10-CM

## 2022-03-21 DIAGNOSIS — M25.562 CHRONIC PAIN OF LEFT KNEE: ICD-10-CM

## 2022-03-21 DIAGNOSIS — G89.4 CHRONIC PAIN DISORDER: ICD-10-CM

## 2022-03-21 DIAGNOSIS — G89.29 CHRONIC PAIN OF LEFT KNEE: ICD-10-CM

## 2022-03-21 DIAGNOSIS — G89.29 CHRONIC PAIN OF LEFT KNEE: Primary | ICD-10-CM

## 2022-03-21 DIAGNOSIS — I50.32 CHRONIC DIASTOLIC CONGESTIVE HEART FAILURE: ICD-10-CM

## 2022-03-21 DIAGNOSIS — M17.12 PRIMARY OSTEOARTHRITIS OF LEFT KNEE: ICD-10-CM

## 2022-03-21 DIAGNOSIS — M25.562 CHRONIC PAIN OF LEFT KNEE: Primary | ICD-10-CM

## 2022-03-21 DIAGNOSIS — G89.29 OTHER CHRONIC PAIN: ICD-10-CM

## 2022-03-21 DIAGNOSIS — C16.9 GASTRIC ADENOCARCINOMA: ICD-10-CM

## 2022-03-21 DIAGNOSIS — E66.01 MORBID OBESITY: ICD-10-CM

## 2022-03-21 PROCEDURE — 3288F FALL RISK ASSESSMENT DOCD: CPT | Mod: CPTII,S$GLB,, | Performed by: STUDENT IN AN ORGANIZED HEALTH CARE EDUCATION/TRAINING PROGRAM

## 2022-03-21 PROCEDURE — 1160F RVW MEDS BY RX/DR IN RCRD: CPT | Mod: CPTII,S$GLB,, | Performed by: STUDENT IN AN ORGANIZED HEALTH CARE EDUCATION/TRAINING PROGRAM

## 2022-03-21 PROCEDURE — 3078F DIAST BP <80 MM HG: CPT | Mod: CPTII,S$GLB,, | Performed by: NURSE PRACTITIONER

## 2022-03-21 PROCEDURE — 3008F BODY MASS INDEX DOCD: CPT | Mod: CPTII,S$GLB,, | Performed by: STUDENT IN AN ORGANIZED HEALTH CARE EDUCATION/TRAINING PROGRAM

## 2022-03-21 PROCEDURE — 1101F PR PT FALLS ASSESS DOC 0-1 FALLS W/OUT INJ PAST YR: ICD-10-PCS | Mod: CPTII,S$GLB,, | Performed by: STUDENT IN AN ORGANIZED HEALTH CARE EDUCATION/TRAINING PROGRAM

## 2022-03-21 PROCEDURE — 25000003 PHARM REV CODE 250: Performed by: STUDENT IN AN ORGANIZED HEALTH CARE EDUCATION/TRAINING PROGRAM

## 2022-03-21 PROCEDURE — 3078F PR MOST RECENT DIASTOLIC BLOOD PRESSURE < 80 MM HG: ICD-10-PCS | Mod: CPTII,S$GLB,, | Performed by: NURSE PRACTITIONER

## 2022-03-21 PROCEDURE — 4010F ACE/ARB THERAPY RXD/TAKEN: CPT | Mod: CPTII,S$GLB,, | Performed by: NURSE PRACTITIONER

## 2022-03-21 PROCEDURE — 1125F AMNT PAIN NOTED PAIN PRSNT: CPT | Mod: CPTII,S$GLB,, | Performed by: STUDENT IN AN ORGANIZED HEALTH CARE EDUCATION/TRAINING PROGRAM

## 2022-03-21 PROCEDURE — 1159F MED LIST DOCD IN RCRD: CPT | Mod: CPTII,S$GLB,, | Performed by: STUDENT IN AN ORGANIZED HEALTH CARE EDUCATION/TRAINING PROGRAM

## 2022-03-21 PROCEDURE — 99214 PR OFFICE/OUTPT VISIT, EST, LEVL IV, 30-39 MIN: ICD-10-PCS | Mod: S$GLB,,, | Performed by: NURSE PRACTITIONER

## 2022-03-21 PROCEDURE — 1101F PT FALLS ASSESS-DOCD LE1/YR: CPT | Mod: CPTII,S$GLB,, | Performed by: NURSE PRACTITIONER

## 2022-03-21 PROCEDURE — 1159F PR MEDICATION LIST DOCUMENTED IN MEDICAL RECORD: ICD-10-PCS | Mod: CPTII,S$GLB,, | Performed by: STUDENT IN AN ORGANIZED HEALTH CARE EDUCATION/TRAINING PROGRAM

## 2022-03-21 PROCEDURE — 3078F DIAST BP <80 MM HG: CPT | Mod: CPTII,S$GLB,, | Performed by: STUDENT IN AN ORGANIZED HEALTH CARE EDUCATION/TRAINING PROGRAM

## 2022-03-21 PROCEDURE — 99999 PR PBB SHADOW E&M-EST. PATIENT-LVL III: ICD-10-PCS | Mod: PBBFAC,,, | Performed by: STUDENT IN AN ORGANIZED HEALTH CARE EDUCATION/TRAINING PROGRAM

## 2022-03-21 PROCEDURE — 96375 TX/PRO/DX INJ NEW DRUG ADDON: CPT

## 2022-03-21 PROCEDURE — 4010F ACE/ARB THERAPY RXD/TAKEN: CPT | Mod: CPTII,S$GLB,, | Performed by: STUDENT IN AN ORGANIZED HEALTH CARE EDUCATION/TRAINING PROGRAM

## 2022-03-21 PROCEDURE — 3074F SYST BP LT 130 MM HG: CPT | Mod: CPTII,S$GLB,, | Performed by: STUDENT IN AN ORGANIZED HEALTH CARE EDUCATION/TRAINING PROGRAM

## 2022-03-21 PROCEDURE — 3008F BODY MASS INDEX DOCD: CPT | Mod: CPTII,S$GLB,, | Performed by: NURSE PRACTITIONER

## 2022-03-21 PROCEDURE — 99214 OFFICE O/P EST MOD 30 MIN: CPT | Mod: S$GLB,,, | Performed by: NURSE PRACTITIONER

## 2022-03-21 PROCEDURE — 1160F PR REVIEW ALL MEDS BY PRESCRIBER/CLIN PHARMACIST DOCUMENTED: ICD-10-PCS | Mod: CPTII,S$GLB,, | Performed by: STUDENT IN AN ORGANIZED HEALTH CARE EDUCATION/TRAINING PROGRAM

## 2022-03-21 PROCEDURE — 3074F PR MOST RECENT SYSTOLIC BLOOD PRESSURE < 130 MM HG: ICD-10-PCS | Mod: CPTII,S$GLB,, | Performed by: STUDENT IN AN ORGANIZED HEALTH CARE EDUCATION/TRAINING PROGRAM

## 2022-03-21 PROCEDURE — 77386 HC IMRT, COMPLEX: CPT | Performed by: STUDENT IN AN ORGANIZED HEALTH CARE EDUCATION/TRAINING PROGRAM

## 2022-03-21 PROCEDURE — 1101F PR PT FALLS ASSESS DOC 0-1 FALLS W/OUT INJ PAST YR: ICD-10-PCS | Mod: CPTII,S$GLB,, | Performed by: NURSE PRACTITIONER

## 2022-03-21 PROCEDURE — A4216 STERILE WATER/SALINE, 10 ML: HCPCS | Performed by: STUDENT IN AN ORGANIZED HEALTH CARE EDUCATION/TRAINING PROGRAM

## 2022-03-21 PROCEDURE — 77014 HC CT GUIDANCE RADIATION THERAPY FLDS PLACEMENT: CPT | Mod: TC | Performed by: STUDENT IN AN ORGANIZED HEALTH CARE EDUCATION/TRAINING PROGRAM

## 2022-03-21 PROCEDURE — 1101F PT FALLS ASSESS-DOCD LE1/YR: CPT | Mod: CPTII,S$GLB,, | Performed by: STUDENT IN AN ORGANIZED HEALTH CARE EDUCATION/TRAINING PROGRAM

## 2022-03-21 PROCEDURE — 77014 PR  CT GUIDANCE PLACEMENT RAD THERAPY FIELDS: ICD-10-PCS | Mod: 26,,, | Performed by: STUDENT IN AN ORGANIZED HEALTH CARE EDUCATION/TRAINING PROGRAM

## 2022-03-21 PROCEDURE — 63600175 PHARM REV CODE 636 W HCPCS: Performed by: STUDENT IN AN ORGANIZED HEALTH CARE EDUCATION/TRAINING PROGRAM

## 2022-03-21 PROCEDURE — 3074F PR MOST RECENT SYSTOLIC BLOOD PRESSURE < 130 MM HG: ICD-10-PCS | Mod: CPTII,S$GLB,, | Performed by: NURSE PRACTITIONER

## 2022-03-21 PROCEDURE — 3008F PR BODY MASS INDEX (BMI) DOCUMENTED: ICD-10-PCS | Mod: CPTII,S$GLB,, | Performed by: NURSE PRACTITIONER

## 2022-03-21 PROCEDURE — 99999 PR PBB SHADOW E&M-EST. PATIENT-LVL IV: CPT | Mod: PBBFAC,,, | Performed by: NURSE PRACTITIONER

## 2022-03-21 PROCEDURE — 99215 OFFICE O/P EST HI 40 MIN: CPT | Mod: S$GLB,,, | Performed by: STUDENT IN AN ORGANIZED HEALTH CARE EDUCATION/TRAINING PROGRAM

## 2022-03-21 PROCEDURE — 3074F SYST BP LT 130 MM HG: CPT | Mod: CPTII,S$GLB,, | Performed by: NURSE PRACTITIONER

## 2022-03-21 PROCEDURE — 3288F PR FALLS RISK ASSESSMENT DOCUMENTED: ICD-10-PCS | Mod: CPTII,S$GLB,, | Performed by: NURSE PRACTITIONER

## 2022-03-21 PROCEDURE — 1126F AMNT PAIN NOTED NONE PRSNT: CPT | Mod: CPTII,S$GLB,, | Performed by: NURSE PRACTITIONER

## 2022-03-21 PROCEDURE — 3078F PR MOST RECENT DIASTOLIC BLOOD PRESSURE < 80 MM HG: ICD-10-PCS | Mod: CPTII,S$GLB,, | Performed by: STUDENT IN AN ORGANIZED HEALTH CARE EDUCATION/TRAINING PROGRAM

## 2022-03-21 PROCEDURE — 3288F FALL RISK ASSESSMENT DOCD: CPT | Mod: CPTII,S$GLB,, | Performed by: NURSE PRACTITIONER

## 2022-03-21 PROCEDURE — 99215 PR OFFICE/OUTPT VISIT, EST, LEVL V, 40-54 MIN: ICD-10-PCS | Mod: S$GLB,,, | Performed by: STUDENT IN AN ORGANIZED HEALTH CARE EDUCATION/TRAINING PROGRAM

## 2022-03-21 PROCEDURE — 96416 CHEMO PROLONG INFUSE W/PUMP: CPT

## 2022-03-21 PROCEDURE — 1125F PR PAIN SEVERITY QUANTIFIED, PAIN PRESENT: ICD-10-PCS | Mod: CPTII,S$GLB,, | Performed by: STUDENT IN AN ORGANIZED HEALTH CARE EDUCATION/TRAINING PROGRAM

## 2022-03-21 PROCEDURE — 4010F PR ACE/ARB THEARPY RXD/TAKEN: ICD-10-PCS | Mod: CPTII,S$GLB,, | Performed by: NURSE PRACTITIONER

## 2022-03-21 PROCEDURE — 99999 PR PBB SHADOW E&M-EST. PATIENT-LVL IV: ICD-10-PCS | Mod: PBBFAC,,, | Performed by: NURSE PRACTITIONER

## 2022-03-21 PROCEDURE — 99999 PR PBB SHADOW E&M-EST. PATIENT-LVL III: CPT | Mod: PBBFAC,,, | Performed by: STUDENT IN AN ORGANIZED HEALTH CARE EDUCATION/TRAINING PROGRAM

## 2022-03-21 PROCEDURE — 3288F PR FALLS RISK ASSESSMENT DOCUMENTED: ICD-10-PCS | Mod: CPTII,S$GLB,, | Performed by: STUDENT IN AN ORGANIZED HEALTH CARE EDUCATION/TRAINING PROGRAM

## 2022-03-21 PROCEDURE — 4010F PR ACE/ARB THEARPY RXD/TAKEN: ICD-10-PCS | Mod: CPTII,S$GLB,, | Performed by: STUDENT IN AN ORGANIZED HEALTH CARE EDUCATION/TRAINING PROGRAM

## 2022-03-21 PROCEDURE — 1126F PR PAIN SEVERITY QUANTIFIED, NO PAIN PRESENT: ICD-10-PCS | Mod: CPTII,S$GLB,, | Performed by: NURSE PRACTITIONER

## 2022-03-21 PROCEDURE — 3008F PR BODY MASS INDEX (BMI) DOCUMENTED: ICD-10-PCS | Mod: CPTII,S$GLB,, | Performed by: STUDENT IN AN ORGANIZED HEALTH CARE EDUCATION/TRAINING PROGRAM

## 2022-03-21 PROCEDURE — 77014 PR  CT GUIDANCE PLACEMENT RAD THERAPY FIELDS: CPT | Mod: 26,,, | Performed by: STUDENT IN AN ORGANIZED HEALTH CARE EDUCATION/TRAINING PROGRAM

## 2022-03-21 RX ORDER — DIPHENHYDRAMINE HYDROCHLORIDE 50 MG/ML
50 INJECTION INTRAMUSCULAR; INTRAVENOUS ONCE AS NEEDED
Status: DISCONTINUED | OUTPATIENT
Start: 2022-03-21 | End: 2022-03-21 | Stop reason: HOSPADM

## 2022-03-21 RX ORDER — HEPARIN 100 UNIT/ML
500 SYRINGE INTRAVENOUS
Status: DISCONTINUED | OUTPATIENT
Start: 2022-03-21 | End: 2022-03-21 | Stop reason: HOSPADM

## 2022-03-21 RX ORDER — ONDANSETRON 2 MG/ML
8 INJECTION INTRAMUSCULAR; INTRAVENOUS
Status: CANCELLED | OUTPATIENT
Start: 2022-03-21

## 2022-03-21 RX ORDER — DIPHENHYDRAMINE HYDROCHLORIDE 50 MG/ML
50 INJECTION INTRAMUSCULAR; INTRAVENOUS ONCE AS NEEDED
Status: CANCELLED | OUTPATIENT
Start: 2022-03-21

## 2022-03-21 RX ORDER — SODIUM CHLORIDE 0.9 % (FLUSH) 0.9 %
10 SYRINGE (ML) INJECTION
Status: CANCELLED | OUTPATIENT
Start: 2022-03-21

## 2022-03-21 RX ORDER — EPINEPHRINE 0.3 MG/.3ML
0.3 INJECTION SUBCUTANEOUS ONCE AS NEEDED
Status: DISCONTINUED | OUTPATIENT
Start: 2022-03-21 | End: 2022-03-21 | Stop reason: HOSPADM

## 2022-03-21 RX ORDER — HEPARIN 100 UNIT/ML
500 SYRINGE INTRAVENOUS
Status: CANCELLED | OUTPATIENT
Start: 2022-03-21

## 2022-03-21 RX ORDER — ONDANSETRON 2 MG/ML
8 INJECTION INTRAMUSCULAR; INTRAVENOUS
Status: COMPLETED | OUTPATIENT
Start: 2022-03-21 | End: 2022-03-21

## 2022-03-21 RX ORDER — HEPARIN 100 UNIT/ML
500 SYRINGE INTRAVENOUS
Status: CANCELLED | OUTPATIENT
Start: 2022-03-25

## 2022-03-21 RX ORDER — SODIUM CHLORIDE 0.9 % (FLUSH) 0.9 %
10 SYRINGE (ML) INJECTION
Status: DISCONTINUED | OUTPATIENT
Start: 2022-03-21 | End: 2022-03-21 | Stop reason: HOSPADM

## 2022-03-21 RX ORDER — OXYCODONE AND ACETAMINOPHEN 5; 325 MG/1; MG/1
1 TABLET ORAL EVERY 8 HOURS PRN
Qty: 90 TABLET | Refills: 0 | Status: SHIPPED | OUTPATIENT
Start: 2022-03-21 | End: 2022-04-20

## 2022-03-21 RX ORDER — EPINEPHRINE 0.3 MG/.3ML
0.3 INJECTION SUBCUTANEOUS ONCE AS NEEDED
Status: CANCELLED | OUTPATIENT
Start: 2022-03-21

## 2022-03-21 RX ORDER — SODIUM CHLORIDE 0.9 % (FLUSH) 0.9 %
10 SYRINGE (ML) INJECTION
Status: CANCELLED | OUTPATIENT
Start: 2022-03-25

## 2022-03-21 RX ADMIN — Medication 10 ML: at 12:03

## 2022-03-21 RX ADMIN — ONDANSETRON 8 MG: 2 INJECTION INTRAMUSCULAR; INTRAVENOUS at 12:03

## 2022-03-21 RX ADMIN — FLUOROURACIL 2240 MG: 50 INJECTION, SOLUTION INTRAVENOUS at 12:03

## 2022-03-21 NOTE — PROGRESS NOTES
"Chronic patient Established Note (Follow up visit)    HPI:  Ca Coats is a 69 y.o. female who presents today with left knee pain. Her pain has been going on for "too long."  She was previously treated by Dr. Iyer at [facility].  She has injections [id number] with him.  These were helpful, but she does not know if the injections were steroid or viscosupplementation.   This pain is described in detail below.     Interval History (4/30/2018):  The patient returns to clinic today for follow up and records review. We did received records from [facility] including a MRI of her knee. Dr. Iyer's last office visit note from January 2017 does not include any previous injections. She continues to report bilateral knee pain, left greater than right. She describes this pain as constant and aching. This pain is worse with prolonged walking. She also report right shoulder pain with prolonged overhead activity. She continues to take Percocet with benefit. She denies any other health changes.     Interval History (5/31/2018):  The patient returns to clinic for follow up. She is s/p left knee Synvisc One injection on 5/8/2018. She reports 35% relief of her knee pain. She continues to report bilateral knee pain, left greater than right. She describes this pain as constant and aching. Her pain is worse with prolonged walking and standing. She is scheduled to see Orthopedics at the  End of this month. She is also following up with Bariatrics to discuss the lap band procedure. She continues to take Percocet with benefit. She also uses Voltaren gel with benefit but this is expensive for her. She denies any other health changes.          Interval History (7/2/2018):  The patient returns to clinic today for follow up. She continues to report left knee pain that is constant, sharp, and aching in nature. Her pain is worse with prolonged walking and standing. She is scheduled for weight loss surgery in August. She continues " "follow up with orthopedics who does not recommend surgery at this time due to her BMI. She continues to take Percocet as needed with benefit. She denies any other health changes.      Interval History (7/19/2018):  The patient returns to clinic today for follow up. She is s/p left genicular nerve block on 7/10/2018. She reports 80% relief of her left knee pain. She reports that she was able to walk for longer distances (3 blocks) after the block. Her pain has returned to baseline. She continues to report left knee pain that is constant, sharp, and aching in nature. Her pain is worse with prolonged walking and standing. She denies any other health changes.      Interval History (8/21/2018):  The patient returns to clinic today for follow up. She is s/p left genicular cooled RFA on 7/31/2018. She reports 60% relief of her left knee pain. She reports intermittent knee pain that is sore and aching in nature. She continues to perform a home exercise routine. She is actively trying to lose weight. She continues to follow up with Bariatric Surgery at [facility]. She is considering weight loss surgery. She continues to use compound cream with benefit. She denies any other health changes.      Interval History (11/21/2018):  The patient returns to clinic today for follow up. She reports increased left knee pain this past week. She reports 2 episodes where her knee has "locked" up on her while walking. She describes her knee pain as sore and aching. She is actively trying to lose weight and quit smoking. She continues to use the compound cream with benefit. She denies any other health changes.      Interval History (1/9/2019):  The patient returns to clinic today for follow up. She is s/p left knee steroid injection on 12/21/2018. She reports one day of relief. She continues to report left knee pain that is constant, sharp, and aching in nature. Her pain is worse with standing and walking. She reports that her knee "locks" up on " her while walking. She often feels as though she is going to fall. She is scheduled for bariatric weight loss surgery in February at [facility]. She denies any other health changes.      Interval History (2/28/2019):  The patient returns to clinic today for follow up. She is s/p Euflexxa series completed on 1/30/2019. She reports that she able to stand for longer periods of time since the procedure. She continues to report left knee pain that is sore and aching in nature. Her pain is worse with prolonged standing and walking. She was previously scheduled for weight loss surgery but reports this was canceled. She is scheduled for follow up next week about this. She denies any other health changes.      Interval History (4/12/2019):  The patient returns for f/u of left knee pain.  She is s/p left genicular cooled RFA with about 90% pain relief.  She has been able to walk easier.  She also notices less stiffness in her knee.  She is planning on gastric surgery prior to knee replacement.  She had benefit with compounding cream but it is no longer covered by her insurance.  She does take Percocet from her PCP.  Her pain today is 7/10.     Interval History (7/11/19):  Patient reported to ED today for increased L knee pain and an episode of LLE weakness that lead to a near fall. Patient is s/p L genicular RFA in 3/19/19 that provided 90% relief for 2 months then came back. Pain is a crunchy pain, in left knee, that does not radiate, worse with movement, better with rest. Pain today is 9/10. She normally uses cane to ambulate but is currently using wheelchair at hospital. Denies any trauma ot the area, fever/chills, n/v, abdominal pain, or HA.     Interval History (8/8/2019):  She returns today for follow up s/p left knee genicular chemodenervation with phenol 7/19/19.  She reports that this procedure did not provide any improvement in her left knee symptoms, and she is still having significant difficulty with ambulation, still  uses wheelchair for mobility. She also reports left lateral thigh and hip pain lateral upper thigh numbness. She has been admitted to [facility] [place] but is not receiving PT there. She is still considering lap band surgery but has missed a followup appointment.      Interval History (9/4/2019):  The patient returns to clinic today for follow up. She continues to report intermittent left lateral thigh and hip pain that is tingling and numb in nature. She continues to report left knee pain. She continues to have difficulty ambulating due to pain. She is currently living in a SNF. She continues to take Gabapentin 300 mg BID. This was increased at last visit but not increased at the nursing home. She does report benefit with Percocet though it only last approximately 4-5 hours. She denies any adverse effects. She does present with a care attendant from the SNF today. Her pain today is 10/10.     Interval History (10/4/2019):  The patient returns to clinic today for follow up and medication refill. She continues to report bilateral knee pain that is aching in nature. She reports intermittent left lateral thigh pain that is tingling and shooting in nature. She reports that her pain has been improving since last visit. She has increased her activity. She continues to report benefit with Gabapentin. She continues to take Percocet as needed with benefit. She denies any other health changes. Her pain today is 0/10.     Interval History (1/6/2020):  The patient returns to clinic today for follow up. She reports increased left leg pain today that is tingling in nature. She continues to report bilateral knee pain, left greater than right. She describes this pain as constant, sharp, and aching. She is no longer living at the nursing facility. She has increased her home exercise routine. She is actively trying to quit smoking in order to move forward with bariatric surgery. She continues to report benefit with current  medication regimen. She takes Gabapentin and Percocet with benefit and without adverse effect. She denies any other health changes. Her pain today is 7/10.     Interval History (4/8/2020):  She returns today for follow up via audio.  She reports that the methocarbamol is not helping.  She continues to have a left-sided low back pain that is bothering her.  Her left knee continues to hurt.  Her right knee is hurting a little as well.  Percocet continues to help, but it is not lasting long enough.     Interval History (5/1/2020):  The patient returns for audio visit today for follow up. She continues to report bilateral knee pain, left greater than right. Her pain is worse with prolonged standing and walking. She reports that her back pain has improved. She continues to report benefit with current medication regimen. She continues to take Gabapentin and Percocet with benefit and without adverse effects. She denies any other health changes. Her pain today is 8/10.     Interval History (6/2/2020):  The patient returns to clinic today via audio visit for follow up of knee pain. She reports increased left knee pain in the last week. Her pain is worse with standing and walking. She feels as though she will fall when standing. She is currently using ice, heat, and OTC lidocaine patches with limited relief. She continues to take Gabapentin and Percocet with some benefit. She denies any adverse effects. She denies any other health changes. Her pain today is 10/10.     Interval History 7/8/2020:  DARCY Coats presents to the clinic for a follow-up appointment for follow up after 6/19/20 RFA Since the last visit, DARCY Coats states the pain has been persistant. Current pain intensity is 10/10.    Interval History 8/11/2020:  The patient returns to clinic today for follow up of knee pain. She is s/p left knee Orthovisc injection on 7/28/2020. She reports limited relief at this time. She continues to report left knee  pain, especially with standing and walking. She feels as though her knee will buckle. She has recently quit smoking. She is actively trying to lose weight. She was recently started on Ozempic per her PCP. She is following her diet plan. She continues to take Percocet with benefit but reports that it does not last. She denies any adverse effects with this medication. She denies any other health changes. Her pain today is 8/10.    Interval History 9/3/2020:  The patient returns to clinic today for follow up of knee pain. She reports some relief with left knee Orthovisc injection in July. She continues to report left knee pain that is sharp in nature. This pain is worse with standing and walking. She feels as though her knee will give out with prolonged standing. She is actively trying to lose weight but is frustrated with lack of progress. She is following her diet plan. She continues to Percocet with benefit but it does not last. She denies any adverse effects. She denies any other health changes. Her pain today is 7/10.    Interval History 12/1/2020:  The patient returns to clinic today for follow up of knee pain. She reports increased left knee pain over the last few weeks. She also reports that her left knee feels weaker over the last month. She feels as thought it will give out with standing and walking. She would like to repeat RFA as she feels this helps her feel more stable and decreases her pain. She continues to take Percocet with some benefit but asks if this can be stronger. She denies any adverse effects with this medication. She continues to follow up with Bariatrics. She continues to follow a diet plan and take Ozempic. She denies any other health changes. Her pain today is 10/10.    Interval History 3/24/2021:  The patient returns to clinic today for follow up of knee pain. She is s/p left genicular RFA on 1/22/2021. She reports some benefit of her left knee pain. She is able to stand and walking for  longer periods of time. She continues to report left knee pain. Since last visit, she had angiogram that was normal. She has discontinued anticoagulation. She also had a recent EGD which she is awaiting the results. She continues to see Bariatrics. She is actively trying to lose weight. She continues to take Gabapentin with benefit. She continues to take Percocet with benefit and without adverse effects. She denies any other health changes. Her pain today is 0/10.    Interval History 6/1/2021:  The patient returns to clinic today for follow up of knee pain. She reports increased left knee pain over the last two weeks. She has increased pain with standing and walking. She states that she can feel the bones rubbing together. She continues to follow up with Bariatrics. She is actively trying to lose weight. She continues to follow up with GI. She is hoping to pursue gastric bypass. She continues to take Gabapentin with benefit. She continues to take Percocet as needed with benefit and without adverse effects. She denies any other health changes. Her pain today is 6/10.    Interval History 7/27/2021:  DARCY Coats presents to the clinic for a follow-up appointment. Since the last visit, DARCY Coats states the pain has been worsening. Current pain intensity is 7/10. Patient had an RFA in the left knee in January 2021. She had really good relief until about June. She was given an hyaulorinic injection at that time. She had good relief for a few weeks but now she is back to the same amount of pain as before that injection. She is having pain with standing and walking. She is continuing to take percocet and it completely gets rid of her pain      Of note, patient was found to have gastric cancer. She is currently on chemotherapy. The plan is to continue chemo until the cancer can be surgically removed.     Interval History 9/14/2021:  The patient returns to clinic today for follow up of knee pain via audio visit.  She is s/p left genicular RFA on 7/30/2021. She reports 50% relief of her knee pain. She continues to report left knee pain with prolonged standing and walking. Since last visit, she has developed a breast abscess. This was drained yesterday. She is on antibiotics. She continues to undergo chemotherapy for gastric cancer. She continues to take Gabapentin with benefit. She continues to take Percocet with benefit and without adverse effects. She denies any other health changes. Her pain today is 2/10.     Interval History 11/9/2021:  The patient returns to clinic today for follow up of knee pain. She reports increased left knee pain, worse with standing and walking. Since last visit, she was admitted to the hospital for diarrhea and dehydration. She continues to undergo chemotherapy treatment for gastric cancer. She continues to take Gabapentin with benefit. She continues to take Percocet as needed with benefit and without adverse effects. She denies any other health changes. Her pain today is 7/10.       Interval History 12/28/2021:  The patient returns to clinic today for follow up of knee pain. She is s/p left knee Monovisc injection on 12/14/2021. She reports 70% relief of her left knee pain. She continues to report intermittent left knee pain, worse with standing and walking. She reports bilateral feet swelling, left greater than right. She has not seen her PCP. She has completed chemotherapy. She is scheduled for EGD in January to monitor tumor size with possible resection. She will likely undergo chemotherapy again after the scope. She continues to take Gabapentin with benefit. She continues to take Percocet as needed with benefit and without adverse effects. She denies any other health changes. Her pain today is 0/10.    Interval History 3/21/2022:  The patient returns to clinic today for follow up of knee pain. She report increased left knee pain, worse with prolonged standing and walking. She did have repeat  EGD with biopsies done. She is currently undergoing chemotherapy and radiation. She continues to take Percocet as needed with benefit and without adverse effects. She denies any other health changes. Her pain today is 0/10  .      Pain Disability Index Review:  Last 3 PDI Scores 3/21/2022 12/28/2021 12/14/2021   Pain Disability Index (PDI) 17 16 30         Physical Therapy: Yes, she did this in 2018 for one month (twice a week). She does HEP (stretching and ROM in the morning)      Pain Medications:    Current:  · Gabapentin, tylenol 500mg Q6 prn, Percocet 5/325 mg TID PRN    Opioid Contract: yes     report:  Reviewed and consistent with medication use as prescribed.    Pain Procedures:   · 5/8/2018- Left knee Synvisc One injection  · 7/10/2018- Left genicular nerve block  · 7/31/2018- Left genicular cooled RFA  · 12/21/2018- Left knee steroid injection  · 1/31/2019- Left knee Euflexxa series  · 3/19/2019 Left genicular cooled RFA- 90% relief  · 7/19/19- Left knee genicular phenol chemodenervation-  · 6/19/2020- Left genicular RFA  · 1/22/2021- Left genicular RFA  · 6/2021- Left knee Orthovisc injection  · 7/30/2021- Left genicular RFA  · 12/14/2021- Left knee Monovisc injection    Physical Therapy//Home Exercise: yes, she did this in 2018 for one month (twice a week). She does HEP (stretching and ROM in the morning)    Imaging:   MRI Left Knee 7/21/2017 (in media):  The medial meniscus is intact. The lateral meniscus is intact.      A chronic complete ACL tear is noted. The posterior cruciate ligament is intact. The medial and lateral retinacula are intact. The medial collateral ligament is intact. The lateral collateral ligament in intact. The posterolateral corner structures are intact.      There is full thickness fissuring of the central aspect medial femoral cartilage. The lateral tibiofemoral compartment cartilage is intact. There is broad thickness defect within the central trochlear cartilage. There is  mild lateral patellofemoral cartilage thinning and regional full thickness loss of the central superior patellar cartilage.      A small effusion is present. There is trace infrapatellar fluid. Tiny popliteal cyst is noted. Subtle focal marrow edema is seen within the posterior tibial plateau. The bone marrow signal is heterogeneous likely reflecting marrow reconversion.      There is trace pes anserine bursitis. The tendons are otherwise normal.      Grade 2 fatty streaking of the semimembranosus and vastus lateralis muscles are noted. There is mild prepatellar edema.      MRI Right Shoulder 7/21/2017 (in media):   There is a complete tear of the supraspinatus tendon with retraction to the glenohumeral joint. There is partial tearing of the anterior infraspinatus articular surface. Mild subscapularis tendinosis is noted. The teres minor is intact. A small amount of subacromial/subdeltoid fluid is noted. The proximal long head biceps tendon is poorly visualized proximally although intact distally.      Degenerative tearing of the anterosuperior through posterosuperior labrum is noted. There is moderate to severe superior glenoid cartilage thinning with subchondral degenerative changes. The glenohumeral ligaments appear grossly intact.      An os acromiale is seen with fluid and osteophytosis at its junction with the base of the acromion. There is moderate superior subluxation of the AC joint with mild osteophytosis.      Small effusion with subcoracoid extension is noted.      There is grade 2/3 fatty atrophy of the supraspinatus muscle, grade 2 of the infraspinatus and subscapularis. Heterogeneous signal is seen throughout the bone marrow likely reflecting marrow reconversion.        Allergies: Review of patient's allergies indicates:  No Known Allergies    Current Medications:   Current Outpatient Medications   Medication Sig Dispense Refill    acetaminophen (TYLENOL) 500 MG tablet       amLODIPine (NORVASC) 10  MG tablet TAKE 1 TABLET ONE TIME DAILY 90 tablet 1    atorvastatin (LIPITOR) 20 MG tablet TAKE 1 TABLET ONE TIME DAILY 90 tablet 1    B-complex with vitamin C (Z-BEC OR EQUIV) tablet Take 1 tablet by mouth once daily.       CALCIUM CITRATE-VITAMIN D2 ORAL Take 1 tablet by mouth once daily.       citalopram (CELEXA) 10 MG tablet Take 1 tablet (10 mg total) by mouth once daily. 90 tablet 3    ELIQUIS 5 mg Tab TAKE 1 TABLET TWICE DAILY 180 tablet 2    ferrous gluconate (FERGON) 324 MG tablet Take 1 tablet (324 mg total) by mouth daily with breakfast. 90 tablet 11    furosemide (LASIX) 80 MG tablet Take 1 tablet (80 mg total) by mouth 2 (two) times a day. 60 tablet 11    gabapentin (NEURONTIN) 300 MG capsule Take 2 capsules (600 mg total) by mouth 3 (three) times daily. 540 capsule 2    lactulose (CHRONULAC) 10 gram/15 mL solution Take 30 mLs (20 g total) by mouth 2 (two) times daily as needed (constipation). 120 mL 1    LIDOcaine-prilocaine (EMLA) cream Apply topically as needed (prior to port access). 30 g 0    lisinopriL (PRINIVIL,ZESTRIL) 40 MG tablet TAKE 1 TABLET ONE TIME DAILY 90 tablet 1    multivitamin with minerals tablet Take 1 tablet by mouth once daily.       oxyCODONE-acetaminophen (PERCOCET) 5-325 mg per tablet Take 1 tablet by mouth every 8 (eight) hours as needed for Pain. 90 tablet 0    pantoprazole (PROTONIX) 40 MG tablet Take 1 tablet (40 mg total) by mouth Daily. 90 tablet 1    polyethylene glycol (GLYCOLAX) 17 gram/dose powder Take 17 g by mouth once daily. 510 g 1    semaglutide (OZEMPIC) 1 mg/dose (2 mg/1.5 mL) PnIj Inject 1 mg into the skin every 7 days. 2 pen 3     No current facility-administered medications for this visit.     Facility-Administered Medications Ordered in Other Visits   Medication Dose Route Frequency Provider Last Rate Last Admin    0.9%  NaCl infusion   Intravenous Continuous Tevin Clark MD   Stopped at 01/13/22 9068       REVIEW OF SYSTEMS:    GENERAL:   No weight loss, malaise or fevers.  HEENT:  Negative for frequent or significant headaches.  NECK:  Negative for lumps, goiter, pain and significant neck swelling.  RESPIRATORY:  Negative for cough, wheezing or shortness of breath. +LAURA  CARDIOVASCULAR:  Negative for chest pain, leg swelling or palpitations. HTN  GI:  Negative for abdominal discomfort, blood in stools or black stools or change in bowel habits. +GERD, gastric cancer actively in chemo  MUSCULOSKELETAL:  See HPI  SKIN:  Negative for lesions, rash, and itching.  PSYCH:  Positive for  mood disorder and recent psychosocial stressors. +sleep disturbance  HEMATOLOGY/LYMPHOLOGY:  Negative for prolonged bleeding, bruising easily or swollen nodes.  NEURO:   No history of headaches, syncope, paralysis, seizures or tremors.  ENDO: Thyroid disorder.   All other reviewed and negative other than HPI.    Past Medical History:  Past Medical History:   Diagnosis Date    YOUNG (acute kidney injury) 10/15/2021    Anemia     2018    Arthritis     BMI 60.0-69.9, adult     Cataract     Chronic diastolic congestive heart failure 1/27/2021    Depression     Dry eye syndrome     Gastric adenocarcinoma 5/25/2021    GERD (gastroesophageal reflux disease)     Glaucoma suspect     History of gastric ulcer     Hyperlipidemia     Hypertension     Hypothyroidism     Morbid obesity     Neuromuscular disorder     Sleep apnea     Thyroid disease        Past Surgical History:  Past Surgical History:   Procedure Laterality Date    APPENDECTOMY      CARDIAC SURGERY      CATARACT EXTRACTION W/  INTRAOCULAR LENS IMPLANT Bilateral     MFIOL OU    CORONARY ANGIOGRAPHY Bilateral 2/24/2021    Procedure: ANGIOGRAM, CORONARY ARTERY;  Surgeon: Cresencio Ridley MD;  Location: [facility] CATH LAB;  Service: Cardiology;  Laterality: Bilateral;  Covid test needed - ok per Danay MORENOU. Pt. w/o transportation or family    ENDOSCOPIC ULTRASOUND OF UPPER GASTROINTESTINAL TRACT N/A  3/17/2021    Procedure: ULTRASOUND, UPPER GI TRACT, ENDOSCOPIC;  Surgeon: Gerald Anaya MD;  Location: [facility] (2ND FLR);  Service: Endoscopy;  Laterality: N/A;  Pt unable to get a ride for swab. Will do rapid test at 8:30 - ttr    ENDOSCOPIC ULTRASOUND OF UPPER GASTROINTESTINAL TRACT N/A 1/13/2022    Procedure: ULTRASOUND, UPPER GI TRACT, ENDOSCOPIC;  Surgeon: Kevin Carballo MD;  Location: [facility] (2ND FLR);  Service: Endoscopy;  Laterality: N/A;  blood thinner approval received, see telephone encounter 1/7/22-BB  rapid  instructions given verbally and sent to address on file in pt's chart-BB    ESOPHAGOGASTRODUODENOSCOPY N/A 2/5/2021    Procedure: EGD (ESOPHAGOGASTRODUODENOSCOPY);  Surgeon: Matthew Hernadez MD;  Location: [facility] (2ND FLR);  Service: Endoscopy;  Laterality: N/A;  BMI-58    Wt: 361#     COVID test at [facility] on 2/2-GT    ESOPHAGOGASTRODUODENOSCOPY N/A 3/17/2021    Procedure: EGD (ESOPHAGOGASTRODUODENOSCOPY);  Surgeon: Gerald Anaya MD;  Location: [facility] (2ND FLR);  Service: Endoscopy;  Laterality: N/A;    ESOPHAGOGASTRODUODENOSCOPY N/A 5/25/2021    Procedure: EGD (ESOPHAGOGASTRODUODENOSCOPY);  Surgeon: Kevin Carballo MD;  Location: [facility] (2ND FLR);  Service: Endoscopy;  Laterality: N/A;  ESD 2 hours  Full COVID vaccination on file. ttr    ESOPHAGOGASTRODUODENOSCOPY N/A 1/13/2022    Procedure: EGD (ESOPHAGOGASTRODUODENOSCOPY);  Surgeon: Kevin Carballo MD;  Location: [facility] (2ND FLR);  Service: Endoscopy;  Laterality: N/A;  blood thinner approval, see telephone encounter 1/7/22-BB  rapid  instructions given verbally and sent to address on file in pt's chart-BB    EYE SURGERY      INJECTION OF ANESTHETIC AGENT AROUND NERVE Left 7/10/2018    Procedure: BLOCK, NERVE;  Surgeon: Samantha Vidal MD;  Location: [facility] PAIN MGT;  Service: Pain Management;  Laterality: Left;  Genicular     INJECTION OF ANESTHETIC AGENT AROUND NERVE Left 7/19/2019    Procedure: Block, Nerve  NERVE BLOCK GENICULAR WITH PHENOL 6%;  Surgeon: Samantha Vidal MD;  Location: [facility] PAIN [facility]GT;  Service: Pain Management;  Laterality: Left;  NEEDS CONSENT    INSERTION OF TUNNELED CENTRAL VENOUS CATHETER (CVC) WITH SUBCUTANEOUS PORT N/A 2021    Procedure: INSERTION, PORT-A-CATH;  Surgeon: Mario Paz MD;  Location: [facility] CATH LAB;  Service: Radiology;  Laterality: N/A;  PORT PLACEMENT    RADIOFREQUENCY ABLATION Left 2020    Procedure: RADIOFREQUENCY ABLATION LEFT GENICULAR;  Surgeon: Tevin Clark MD;  Location: [facility] PAIN [facility]GT;  Service: Pain Management;  Laterality: Left;  Left Genicular RFA    RADIOFREQUENCY ABLATION Left 2021    Procedure: RADIOFREQUENCY ABLATION LEFT GENICULAR;  Surgeon: Tevin Clark MD;  Location: [facility] PAIN [facility]GT;  Service: Pain Management;  Laterality: Left;    RADIOFREQUENCY ABLATION Left 2021    Procedure: RADIOFREQUENCY ABLATION, GENICULAR COOLED NEED CONSENT;  Surgeon: Tevin Clark MD;  Location: [facility] PAIN [facility]GT;  Service: Pain Management;  Laterality: Left;    TONSILLECTOMY         Family History:  Family History   Problem Relation Age of Onset    No Known Problems Mother     No Known Problems Father     Colon cancer Neg Hx     Esophageal cancer Neg Hx        Social History:  Social History     Socioeconomic History    Marital status: Single    Number of children: 2   Occupational History     Comment: retired  and cook   Tobacco Use    Smoking status: Current Some Day Smoker     Packs/day: 0.25     Years: 50.00     Pack years: 12.50     Types: Cigarettes     Last attempt to quit: 2020     Years since quittin.2    Smokeless tobacco: Never Used   Substance and Sexual Activity    Alcohol use: Yes     Comment: raely    Drug use: No    Sexual activity: Not Currently     Partners: Male   Social History Narrative    2 children (dtr in area) and she has 3 grandkids, 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       OBJECTIVE:    BP  "122/75 (BP Location: Right arm, Patient Position: Sitting, BP Method: Medium (Automatic))   Pulse 60   Temp 97.9 °F (36.6 °C) (Temporal)   Ht 5' 6" (1.676 m)   BMI 47.79 kg/m²     PHYSICAL EXAMINATION:    General appearance: Well appearing, in no acute distress, alert and oriented x3.  Psych:  Mood and affect appropriate.  Skin: Skin color, texture, turgor normal, no rashes or lesions, in both upper and lower body.  Head/face:  Atraumatic, normocephalic.   Pulm: Symmetric chest rise, no respiratory distress noted.   GI: Abdomen soft and non-tender.  Extremities:  No deformities or skin discoloration. Good capillary refill. +2 pitting edema to bilateral feet.   Musculoskeletal: There is pain with palpation over medial and lateral joint line of left knee. Limited ROM with pain on flexion and extension of left knee. Crepitus noted to left knee. No atrophy or tone abnormalities are noted.  Neuro:  Plantar response are downgoing. No loss of sensation is noted.  Gait: Antalgic- ambulates with wheelchair.     ASSESSMENT: 71 y.o. year old female with left knee pain, consistent with the followin. Chronic pain of left knee     2. Primary osteoarthritis of left knee     3. Gastric adenocarcinoma     4. Chronic pain disorder           PLAN:     - Previous imaging reviewed today. Labs reviewed.     - Schedule for left genicular RFA.     - We can repeat left knee Monovisc injection with benefit.     - Continue follow up with Oncology for chemotherapy.     - Pain contract signed 2020.    - Continue Percocet 5/325 mg TID PRN. Refill sent with appropriate date.     - The patient is here today for a refill of current pain medications and they believe these provide effective pain control and improvements in quality of life.  The patient notes no serious side effects, and feels the benefits outweigh the risks.  The patient was reminded of the pain contract that they signed previously as well as the risks and benefits " of the medication including possible death.  The updated [place] [employer] prescription monitoring program was reviewed, and the patient has been found to be compliant with current treatment plan.    - Previous serum drug of abuse reviewed. Repeat UDS next visit.     - Continue Gabapentin.     - I have stressed the importance of physical activity and a home exercise plan to help with pain and improve health.    - RTC 3 weeks after above procedure.     - Dr. Clark was consulted on the patient and agrees with this plan.    The above plan and management options were discussed at length with patient. Patient is in agreement with the above and verbalized understanding.    Savanna Hyatt NP  03/21/2022

## 2022-03-21 NOTE — PROGRESS NOTES
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          DVT right lower extremity  -eliquis prescribed  -duration - atleast as long as she has cancer, may need to be on it lifelong due to obesity and limited movement due to DJD  -tolerating blood thinners without any issues with bleeding      Mild anemia-   Monitor hb, s/p 2 doses of injectafer with improvement     Immunodeficiency - monitor   UTD covid vaccination     GERD  -on ppi , per pcp    HTN/ Obesity/hyperlipidemia- BP controlled, on meds, per pcp   High protein , low calorie diet advised  CHF- compensated on exam today, pt has echo stress test which was abnormal jan 2021, followed by [facility] in Feb 2021 which showed clean coronaries.   EKG 2/2021- sinus rhythm   Per cardiology    Problem List Items Addressed This Visit     Essential hypertension    Gastric adenocarcinoma - Primary    Overview     gastric cancer cT2 or higher           Chronic diastolic congestive heart failure (Chronic)    History of DVT (deep vein thrombosis) (Chronic)      Other Visit Diagnoses     Morbid obesity            Route Chart for Scheduling    Med Onc Chart Routing      Follow up with physician 1 week.   Follow up with FREEDOM    Labs CBC, CMP and magnesium   Lab interval:     Imaging None      Pharmacy appointment No pharmacy appointment needed      Other referrals No additional referrals needed         Treatment Plan Information   OP FLUOROURACIL (5 DAY) QW + XRT   Cathy Pelaez,  MD   Upcoming Treatment Dates - OP FLUOROURACIL (5 DAY) QW + XRT    3/20/2022       Chemotherapy       fluorouracil (ADRUCIL) 225 mg/m2/day = 2,260 mg in sodium chloride 0.9% chemo infusion  3/27/2022       Chemotherapy       fluorouracil (ADRUCIL) 225 mg/m2/day = 2,260 mg in sodium chloride 0.9% chemo infusion    Therapy Plan Information  INJECTAFER (FERRIC CARBOXYMALTOSE)  EPINEPHrine (EPIPEN) 0.3 mg/0.3 mL pen injection 0.3 mg  0.3 mg, Intramuscular, PRN  diphenhydrAMINE injection 50 mg  50 mg, Intravenous, PRN  methylPREDNISolone sodium succinate injection 125 mg  125 mg, Intravenous, PRN  sodium chloride 0.9% bolus 1,000 mL  1,000 mL, Intravenous, PRN    PORT FLUSH  Flushes  heparin, porcine (PF) 100 unit/mL injection flush 500 Units  500 Units, Intravenous, Every visit  sodium chloride 0.9% flush 10 mL  10 mL, Intravenous, Every visit    Electronically signed by Cathy Gupta [facility]      Future Appointments   Date Time Provider Department Center   3/21/2022 11:15 AM CHEMO 03 [facility] CHEMO [facility]   3/23/2022  9:30 AM [facility] CT OP LIMIT 450 LBS [facility] CTSCANO [facility]   3/25/2022  1:30 PM INJECTION, [facility] CHEMO [facility] CHEMO [facility]   6/21/2022 10:00 AM Savanna Hyatt NP [facility] PAINT [facility]           This note was created with voice recognition software.  Grammatical, syntax and spelling errors may be inevitable.

## 2022-03-22 PROCEDURE — 77014 PR  CT GUIDANCE PLACEMENT RAD THERAPY FIELDS: CPT | Mod: 26,,, | Performed by: STUDENT IN AN ORGANIZED HEALTH CARE EDUCATION/TRAINING PROGRAM

## 2022-03-22 PROCEDURE — 77014 HC CT GUIDANCE RADIATION THERAPY FLDS PLACEMENT: CPT | Mod: TC | Performed by: STUDENT IN AN ORGANIZED HEALTH CARE EDUCATION/TRAINING PROGRAM

## 2022-03-22 PROCEDURE — 77386 HC IMRT, COMPLEX: CPT | Performed by: STUDENT IN AN ORGANIZED HEALTH CARE EDUCATION/TRAINING PROGRAM

## 2022-03-22 PROCEDURE — 77014 PR  CT GUIDANCE PLACEMENT RAD THERAPY FIELDS: ICD-10-PCS | Mod: 26,,, | Performed by: STUDENT IN AN ORGANIZED HEALTH CARE EDUCATION/TRAINING PROGRAM

## 2022-03-23 ENCOUNTER — HOSPITAL ENCOUNTER (OUTPATIENT)
Dept: RADIOLOGY | Facility: OTHER | Age: 72
Discharge: HOME | End: 2022-03-23
Attending: STUDENT IN AN ORGANIZED HEALTH CARE EDUCATION/TRAINING PROGRAM
Payer: MEDICARE

## 2022-03-23 DIAGNOSIS — C16.9 GASTRIC ADENOCARCINOMA: ICD-10-CM

## 2022-03-23 PROCEDURE — 77014 PR  CT GUIDANCE PLACEMENT RAD THERAPY FIELDS: CPT | Mod: 26,,, | Performed by: STUDENT IN AN ORGANIZED HEALTH CARE EDUCATION/TRAINING PROGRAM

## 2022-03-23 PROCEDURE — 77014 CT GUIDANCE RADIATION THERAPY FIELD: CPT | Mod: TC

## 2022-03-23 PROCEDURE — 77014 HC CT GUIDANCE RADIATION THERAPY FLDS PLACEMENT: CPT | Mod: TC | Performed by: STUDENT IN AN ORGANIZED HEALTH CARE EDUCATION/TRAINING PROGRAM

## 2022-03-23 PROCEDURE — 77014 PR  CT GUIDANCE PLACEMENT RAD THERAPY FIELDS: ICD-10-PCS | Mod: 26,,, | Performed by: STUDENT IN AN ORGANIZED HEALTH CARE EDUCATION/TRAINING PROGRAM

## 2022-03-23 PROCEDURE — 77386 HC IMRT, COMPLEX: CPT | Performed by: STUDENT IN AN ORGANIZED HEALTH CARE EDUCATION/TRAINING PROGRAM

## 2022-03-24 ENCOUNTER — DOCUMENTATION ONLY (OUTPATIENT)
Dept: RADIATION ONCOLOGY | Facility: CLINIC | Age: 72
End: 2022-03-24
Payer: MEDICARE

## 2022-03-24 PROCEDURE — 77300 RADIATION THERAPY DOSE PLAN: CPT | Mod: TC | Performed by: STUDENT IN AN ORGANIZED HEALTH CARE EDUCATION/TRAINING PROGRAM

## 2022-03-24 PROCEDURE — 77386 HC IMRT, COMPLEX: CPT | Performed by: STUDENT IN AN ORGANIZED HEALTH CARE EDUCATION/TRAINING PROGRAM

## 2022-03-24 PROCEDURE — 77338 DESIGN MLC DEVICE FOR IMRT: CPT | Mod: 26,,, | Performed by: STUDENT IN AN ORGANIZED HEALTH CARE EDUCATION/TRAINING PROGRAM

## 2022-03-24 PROCEDURE — 77338 PR  MLC IMRT DESIGN & CONSTRUCTION PER IMRT PLAN: ICD-10-PCS | Mod: 26,,, | Performed by: STUDENT IN AN ORGANIZED HEALTH CARE EDUCATION/TRAINING PROGRAM

## 2022-03-24 PROCEDURE — 77014 PR  CT GUIDANCE PLACEMENT RAD THERAPY FIELDS: CPT | Mod: 26,,, | Performed by: STUDENT IN AN ORGANIZED HEALTH CARE EDUCATION/TRAINING PROGRAM

## 2022-03-24 PROCEDURE — 77338 DESIGN MLC DEVICE FOR IMRT: CPT | Mod: TC | Performed by: STUDENT IN AN ORGANIZED HEALTH CARE EDUCATION/TRAINING PROGRAM

## 2022-03-24 PROCEDURE — 77300 PR RADIATION THERAPY,DOSIMETRY PLAN: ICD-10-PCS | Mod: 26,,, | Performed by: STUDENT IN AN ORGANIZED HEALTH CARE EDUCATION/TRAINING PROGRAM

## 2022-03-24 PROCEDURE — 77014 PR  CT GUIDANCE PLACEMENT RAD THERAPY FIELDS: ICD-10-PCS | Mod: 26,,, | Performed by: STUDENT IN AN ORGANIZED HEALTH CARE EDUCATION/TRAINING PROGRAM

## 2022-03-24 PROCEDURE — 77300 RADIATION THERAPY DOSE PLAN: CPT | Mod: 26,,, | Performed by: STUDENT IN AN ORGANIZED HEALTH CARE EDUCATION/TRAINING PROGRAM

## 2022-03-24 PROCEDURE — 77014 HC CT GUIDANCE RADIATION THERAPY FLDS PLACEMENT: CPT | Mod: TC | Performed by: STUDENT IN AN ORGANIZED HEALTH CARE EDUCATION/TRAINING PROGRAM

## 2022-03-24 PROCEDURE — 77336 RADIATION PHYSICS CONSULT: CPT | Performed by: STUDENT IN AN ORGANIZED HEALTH CARE EDUCATION/TRAINING PROGRAM

## 2022-03-24 NOTE — PLAN OF CARE
Pt. On day 25 of outpt. Xrt to abdomen.  Pt. C/o fatigue.  Nausea relieved with zofran.  Pt. In WC.  NPO status maintained prior to daily treatments.

## 2022-03-25 ENCOUNTER — INFUSION (OUTPATIENT)
Dept: INFUSION THERAPY | Facility: OTHER | Age: 72
End: 2022-03-25
Attending: STUDENT IN AN ORGANIZED HEALTH CARE EDUCATION/TRAINING PROGRAM
Payer: MEDICARE

## 2022-03-25 VITALS
TEMPERATURE: 98 F | HEART RATE: 80 BPM | RESPIRATION RATE: 16 BRPM | DIASTOLIC BLOOD PRESSURE: 66 MMHG | SYSTOLIC BLOOD PRESSURE: 129 MMHG | OXYGEN SATURATION: 97 %

## 2022-03-25 DIAGNOSIS — C16.9 GASTRIC ADENOCARCINOMA: Primary | ICD-10-CM

## 2022-03-25 PROCEDURE — 63600175 PHARM REV CODE 636 W HCPCS: Performed by: STUDENT IN AN ORGANIZED HEALTH CARE EDUCATION/TRAINING PROGRAM

## 2022-03-25 PROCEDURE — A4216 STERILE WATER/SALINE, 10 ML: HCPCS | Performed by: STUDENT IN AN ORGANIZED HEALTH CARE EDUCATION/TRAINING PROGRAM

## 2022-03-25 PROCEDURE — 25000003 PHARM REV CODE 250: Performed by: STUDENT IN AN ORGANIZED HEALTH CARE EDUCATION/TRAINING PROGRAM

## 2022-03-25 PROCEDURE — 77014 PR  CT GUIDANCE PLACEMENT RAD THERAPY FIELDS: ICD-10-PCS | Mod: 26,,, | Performed by: STUDENT IN AN ORGANIZED HEALTH CARE EDUCATION/TRAINING PROGRAM

## 2022-03-25 PROCEDURE — 77386 HC IMRT, COMPLEX: CPT | Performed by: STUDENT IN AN ORGANIZED HEALTH CARE EDUCATION/TRAINING PROGRAM

## 2022-03-25 PROCEDURE — 77014 HC CT GUIDANCE RADIATION THERAPY FLDS PLACEMENT: CPT | Mod: TC | Performed by: STUDENT IN AN ORGANIZED HEALTH CARE EDUCATION/TRAINING PROGRAM

## 2022-03-25 PROCEDURE — 77014 PR  CT GUIDANCE PLACEMENT RAD THERAPY FIELDS: CPT | Mod: 26,,, | Performed by: STUDENT IN AN ORGANIZED HEALTH CARE EDUCATION/TRAINING PROGRAM

## 2022-03-25 RX ORDER — SODIUM CHLORIDE 0.9 % (FLUSH) 0.9 %
10 SYRINGE (ML) INJECTION
Status: DISCONTINUED | OUTPATIENT
Start: 2022-03-25 | End: 2022-03-25 | Stop reason: HOSPADM

## 2022-03-25 RX ORDER — HEPARIN 100 UNIT/ML
500 SYRINGE INTRAVENOUS
Status: DISCONTINUED | OUTPATIENT
Start: 2022-03-25 | End: 2022-03-25 | Stop reason: HOSPADM

## 2022-03-25 RX ADMIN — HEPARIN 500 UNITS: 100 SYRINGE at 12:03

## 2022-03-25 RX ADMIN — Medication 10 ML: at 12:03

## 2022-03-25 NOTE — PROGRESS NOTES
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 adequate-proceed with cycle 4 on 09/29/2021.   Post chemo-CT scan stable. Not a surgical candidate due to comorbidities.   EGD report reviewed. Scar in the gastric antrum. Biopsied.                          - Congested, nodular and ulcerated mucosa in the antrum. Biopsied. Treated with argon plasma coagulation (APC).    Path negative for cancer.   Pre op chemo is not curative. Further treatment options discussed with pt. Observation vs curative chemo radiation. She prefers definitive curative treatment.   Pt had complication with multidrug regimen. Will plan for single agent 4FU with radiation. Pt is unable to do 5 days pump as she doesn't have transportation over the weekend and she doesn't want to keep the pump throughout the week. We discussed folfox every other week with 4 days of chemo  vs 4 days of 5FU single agent (instead of 5 days) - she prefers to do 4 days of chemo understanding that it would not be standard.   Labs reviewed, adequate , proceed with chemo for 2 days. Last day of XRT 3/29/22 per pt. Will order scan next time.   RTC 4 weeks          DVT right lower extremity  -eliquis prescribed  -duration - atleast as long as she has cancer, may need to be on it lifelong due to obesity and limited movement due to DJD  -tolerating blood thinners without any issues with bleeding      Mild anemia-   Monitor hb, s/p 2  doses of injectafer with improvement     Immunodeficiency - monitor   UTD covid vaccination     GERD  -on ppi , per pcp    HTN/ Obesity/hyperlipidemia- BP controlled, on meds, per pcp   High protein , low calorie diet advised  CHF- compensated on exam today, pt has echo stress test which was abnormal jan 2021, followed by C in Feb 2021 which showed clean coronaries.   EKG 2/2021- sinus rhythm   Per cardiology    Problem List Items Addressed This Visit     Essential hypertension    Gastric adenocarcinoma - Primary    Overview     gastric cancer cT2 or higher           Primary osteoarthritis of left knee    Chronic diastolic congestive heart failure (Chronic)    History of DVT (deep vein thrombosis) (Chronic)      Other Visit Diagnoses     Morbid obesity              Electronically signed by Cathy Pelaez    [facility]      Future Appointments   Date Time Provider Department Center   3/30/2022  1:15 PM INJECTION, [facility] CHE[facility]   4/1/2022  2:00 PM INJECTION, [facility] CHEMO [facility]   4/25/2022 10:30 AM LAB, SAME DAY [facility]   4/25/2022 11:30 AM Cathy Pelaez MD [facility] HEM ONC [facility]   5/16/2022 11:00 AM Savanna Hyatt NP [facility] PAINMGT [facility]   6/21/2022 10:00 AM Savanna Hyatt NP [facility] PAINMGT [facility]           This note was created with voice recognition software.  Grammatical, syntax and spelling errors may be inevitable.

## 2022-03-28 ENCOUNTER — OFFICE VISIT (OUTPATIENT)
Dept: HEMATOLOGY/ONCOLOGY | Facility: CLINIC | Age: 72
End: 2022-03-28
Payer: MEDICARE

## 2022-03-28 ENCOUNTER — INFUSION (OUTPATIENT)
Dept: INFUSION THERAPY | Facility: OTHER | Age: 72
End: 2022-03-28
Attending: STUDENT IN AN ORGANIZED HEALTH CARE EDUCATION/TRAINING PROGRAM
Payer: MEDICARE

## 2022-03-28 VITALS
WEIGHT: 286.19 LBS | DIASTOLIC BLOOD PRESSURE: 57 MMHG | SYSTOLIC BLOOD PRESSURE: 109 MMHG | RESPIRATION RATE: 18 BRPM | HEART RATE: 96 BPM | HEIGHT: 66 IN | TEMPERATURE: 97 F | OXYGEN SATURATION: 96 % | BODY MASS INDEX: 45.99 KG/M2

## 2022-03-28 DIAGNOSIS — M17.12 PRIMARY OSTEOARTHRITIS OF LEFT KNEE: ICD-10-CM

## 2022-03-28 DIAGNOSIS — Z86.718 HISTORY OF DVT (DEEP VEIN THROMBOSIS): Chronic | ICD-10-CM

## 2022-03-28 DIAGNOSIS — C16.9 GASTRIC ADENOCARCINOMA: Primary | ICD-10-CM

## 2022-03-28 DIAGNOSIS — I50.32 CHRONIC DIASTOLIC CONGESTIVE HEART FAILURE: Chronic | ICD-10-CM

## 2022-03-28 DIAGNOSIS — E66.01 MORBID OBESITY: ICD-10-CM

## 2022-03-28 DIAGNOSIS — I10 ESSENTIAL HYPERTENSION: ICD-10-CM

## 2022-03-28 PROCEDURE — 1125F AMNT PAIN NOTED PAIN PRSNT: CPT | Mod: CPTII,S$GLB,, | Performed by: STUDENT IN AN ORGANIZED HEALTH CARE EDUCATION/TRAINING PROGRAM

## 2022-03-28 PROCEDURE — 3078F PR MOST RECENT DIASTOLIC BLOOD PRESSURE < 80 MM HG: ICD-10-PCS | Mod: CPTII,S$GLB,, | Performed by: STUDENT IN AN ORGANIZED HEALTH CARE EDUCATION/TRAINING PROGRAM

## 2022-03-28 PROCEDURE — 4010F ACE/ARB THERAPY RXD/TAKEN: CPT | Mod: CPTII,S$GLB,, | Performed by: STUDENT IN AN ORGANIZED HEALTH CARE EDUCATION/TRAINING PROGRAM

## 2022-03-28 PROCEDURE — 99215 OFFICE O/P EST HI 40 MIN: CPT | Mod: S$GLB,,, | Performed by: STUDENT IN AN ORGANIZED HEALTH CARE EDUCATION/TRAINING PROGRAM

## 2022-03-28 PROCEDURE — 77014 PR  CT GUIDANCE PLACEMENT RAD THERAPY FIELDS: ICD-10-PCS | Mod: 26,,, | Performed by: STUDENT IN AN ORGANIZED HEALTH CARE EDUCATION/TRAINING PROGRAM

## 2022-03-28 PROCEDURE — 25000003 PHARM REV CODE 250: Performed by: STUDENT IN AN ORGANIZED HEALTH CARE EDUCATION/TRAINING PROGRAM

## 2022-03-28 PROCEDURE — 99999 PR PBB SHADOW E&M-EST. PATIENT-LVL IV: ICD-10-PCS | Mod: PBBFAC,,, | Performed by: STUDENT IN AN ORGANIZED HEALTH CARE EDUCATION/TRAINING PROGRAM

## 2022-03-28 PROCEDURE — 1160F RVW MEDS BY RX/DR IN RCRD: CPT | Mod: CPTII,S$GLB,, | Performed by: STUDENT IN AN ORGANIZED HEALTH CARE EDUCATION/TRAINING PROGRAM

## 2022-03-28 PROCEDURE — 96374 THER/PROPH/DIAG INJ IV PUSH: CPT

## 2022-03-28 PROCEDURE — 4010F PR ACE/ARB THEARPY RXD/TAKEN: ICD-10-PCS | Mod: CPTII,S$GLB,, | Performed by: STUDENT IN AN ORGANIZED HEALTH CARE EDUCATION/TRAINING PROGRAM

## 2022-03-28 PROCEDURE — 1125F PR PAIN SEVERITY QUANTIFIED, PAIN PRESENT: ICD-10-PCS | Mod: CPTII,S$GLB,, | Performed by: STUDENT IN AN ORGANIZED HEALTH CARE EDUCATION/TRAINING PROGRAM

## 2022-03-28 PROCEDURE — 77386 HC IMRT, COMPLEX: CPT | Performed by: STUDENT IN AN ORGANIZED HEALTH CARE EDUCATION/TRAINING PROGRAM

## 2022-03-28 PROCEDURE — 77014 HC CT GUIDANCE RADIATION THERAPY FLDS PLACEMENT: CPT | Mod: TC | Performed by: STUDENT IN AN ORGANIZED HEALTH CARE EDUCATION/TRAINING PROGRAM

## 2022-03-28 PROCEDURE — 3288F FALL RISK ASSESSMENT DOCD: CPT | Mod: CPTII,S$GLB,, | Performed by: STUDENT IN AN ORGANIZED HEALTH CARE EDUCATION/TRAINING PROGRAM

## 2022-03-28 PROCEDURE — 3078F DIAST BP <80 MM HG: CPT | Mod: CPTII,S$GLB,, | Performed by: STUDENT IN AN ORGANIZED HEALTH CARE EDUCATION/TRAINING PROGRAM

## 2022-03-28 PROCEDURE — 1101F PT FALLS ASSESS-DOCD LE1/YR: CPT | Mod: CPTII,S$GLB,, | Performed by: STUDENT IN AN ORGANIZED HEALTH CARE EDUCATION/TRAINING PROGRAM

## 2022-03-28 PROCEDURE — 63600175 PHARM REV CODE 636 W HCPCS: Performed by: STUDENT IN AN ORGANIZED HEALTH CARE EDUCATION/TRAINING PROGRAM

## 2022-03-28 PROCEDURE — 3008F PR BODY MASS INDEX (BMI) DOCUMENTED: ICD-10-PCS | Mod: CPTII,S$GLB,, | Performed by: STUDENT IN AN ORGANIZED HEALTH CARE EDUCATION/TRAINING PROGRAM

## 2022-03-28 PROCEDURE — 3288F PR FALLS RISK ASSESSMENT DOCUMENTED: ICD-10-PCS | Mod: CPTII,S$GLB,, | Performed by: STUDENT IN AN ORGANIZED HEALTH CARE EDUCATION/TRAINING PROGRAM

## 2022-03-28 PROCEDURE — 3074F PR MOST RECENT SYSTOLIC BLOOD PRESSURE < 130 MM HG: ICD-10-PCS | Mod: CPTII,S$GLB,, | Performed by: STUDENT IN AN ORGANIZED HEALTH CARE EDUCATION/TRAINING PROGRAM

## 2022-03-28 PROCEDURE — 3008F BODY MASS INDEX DOCD: CPT | Mod: CPTII,S$GLB,, | Performed by: STUDENT IN AN ORGANIZED HEALTH CARE EDUCATION/TRAINING PROGRAM

## 2022-03-28 PROCEDURE — 1159F PR MEDICATION LIST DOCUMENTED IN MEDICAL RECORD: ICD-10-PCS | Mod: CPTII,S$GLB,, | Performed by: STUDENT IN AN ORGANIZED HEALTH CARE EDUCATION/TRAINING PROGRAM

## 2022-03-28 PROCEDURE — 1159F MED LIST DOCD IN RCRD: CPT | Mod: CPTII,S$GLB,, | Performed by: STUDENT IN AN ORGANIZED HEALTH CARE EDUCATION/TRAINING PROGRAM

## 2022-03-28 PROCEDURE — 77014 PR  CT GUIDANCE PLACEMENT RAD THERAPY FIELDS: CPT | Mod: 26,,, | Performed by: STUDENT IN AN ORGANIZED HEALTH CARE EDUCATION/TRAINING PROGRAM

## 2022-03-28 PROCEDURE — 3074F SYST BP LT 130 MM HG: CPT | Mod: CPTII,S$GLB,, | Performed by: STUDENT IN AN ORGANIZED HEALTH CARE EDUCATION/TRAINING PROGRAM

## 2022-03-28 PROCEDURE — 1101F PR PT FALLS ASSESS DOC 0-1 FALLS W/OUT INJ PAST YR: ICD-10-PCS | Mod: CPTII,S$GLB,, | Performed by: STUDENT IN AN ORGANIZED HEALTH CARE EDUCATION/TRAINING PROGRAM

## 2022-03-28 PROCEDURE — 99215 PR OFFICE/OUTPT VISIT, EST, LEVL V, 40-54 MIN: ICD-10-PCS | Mod: S$GLB,,, | Performed by: STUDENT IN AN ORGANIZED HEALTH CARE EDUCATION/TRAINING PROGRAM

## 2022-03-28 PROCEDURE — 99999 PR PBB SHADOW E&M-EST. PATIENT-LVL IV: CPT | Mod: PBBFAC,,, | Performed by: STUDENT IN AN ORGANIZED HEALTH CARE EDUCATION/TRAINING PROGRAM

## 2022-03-28 PROCEDURE — 1160F PR REVIEW ALL MEDS BY PRESCRIBER/CLIN PHARMACIST DOCUMENTED: ICD-10-PCS | Mod: CPTII,S$GLB,, | Performed by: STUDENT IN AN ORGANIZED HEALTH CARE EDUCATION/TRAINING PROGRAM

## 2022-03-28 PROCEDURE — 96416 CHEMO PROLONG INFUSE W/PUMP: CPT

## 2022-03-28 RX ORDER — SODIUM CHLORIDE 0.9 % (FLUSH) 0.9 %
10 SYRINGE (ML) INJECTION
Status: DISCONTINUED | OUTPATIENT
Start: 2022-03-28 | End: 2022-03-28 | Stop reason: HOSPADM

## 2022-03-28 RX ORDER — HEPARIN 100 UNIT/ML
500 SYRINGE INTRAVENOUS
Status: DISCONTINUED | OUTPATIENT
Start: 2022-03-28 | End: 2022-03-28 | Stop reason: HOSPADM

## 2022-03-28 RX ORDER — HEPARIN 100 UNIT/ML
500 SYRINGE INTRAVENOUS
Status: CANCELLED | OUTPATIENT
Start: 2022-03-28

## 2022-03-28 RX ORDER — SODIUM CHLORIDE 0.9 % (FLUSH) 0.9 %
10 SYRINGE (ML) INJECTION
Status: CANCELLED | OUTPATIENT
Start: 2022-03-28

## 2022-03-28 RX ORDER — HEPARIN 100 UNIT/ML
500 SYRINGE INTRAVENOUS
Status: CANCELLED | OUTPATIENT
Start: 2022-03-31

## 2022-03-28 RX ORDER — SODIUM CHLORIDE 0.9 % (FLUSH) 0.9 %
10 SYRINGE (ML) INJECTION
Status: CANCELLED | OUTPATIENT
Start: 2022-03-31

## 2022-03-28 RX ORDER — ONDANSETRON 2 MG/ML
8 INJECTION INTRAMUSCULAR; INTRAVENOUS
Status: CANCELLED | OUTPATIENT
Start: 2022-03-28

## 2022-03-28 RX ORDER — EPINEPHRINE 0.3 MG/.3ML
0.3 INJECTION SUBCUTANEOUS ONCE AS NEEDED
Status: DISCONTINUED | OUTPATIENT
Start: 2022-03-28 | End: 2022-03-28 | Stop reason: HOSPADM

## 2022-03-28 RX ORDER — DIPHENHYDRAMINE HYDROCHLORIDE 50 MG/ML
50 INJECTION INTRAMUSCULAR; INTRAVENOUS ONCE AS NEEDED
Status: CANCELLED | OUTPATIENT
Start: 2022-03-28

## 2022-03-28 RX ORDER — ONDANSETRON 2 MG/ML
8 INJECTION INTRAMUSCULAR; INTRAVENOUS
Status: COMPLETED | OUTPATIENT
Start: 2022-03-28 | End: 2022-03-28

## 2022-03-28 RX ORDER — DIPHENHYDRAMINE HYDROCHLORIDE 50 MG/ML
50 INJECTION INTRAMUSCULAR; INTRAVENOUS ONCE AS NEEDED
Status: DISCONTINUED | OUTPATIENT
Start: 2022-03-28 | End: 2022-03-28 | Stop reason: HOSPADM

## 2022-03-28 RX ORDER — EPINEPHRINE 0.3 MG/.3ML
0.3 INJECTION SUBCUTANEOUS ONCE AS NEEDED
Status: CANCELLED | OUTPATIENT
Start: 2022-03-28

## 2022-03-28 RX ADMIN — FLUOROURACIL 1105 MG: 50 INJECTION, SOLUTION INTRAVENOUS at 01:03

## 2022-03-28 RX ADMIN — ONDANSETRON 8 MG: 2 INJECTION INTRAMUSCULAR; INTRAVENOUS at 11:03

## 2022-03-28 NOTE — PLAN OF CARE
Home Chemo infusion pump started. Pt to return 3/30 for DC of pump.  Pt verbalized understanding of discharge instructions before leaving.

## 2022-03-29 PROCEDURE — 77014 PR  CT GUIDANCE PLACEMENT RAD THERAPY FIELDS: ICD-10-PCS | Mod: 26,,, | Performed by: STUDENT IN AN ORGANIZED HEALTH CARE EDUCATION/TRAINING PROGRAM

## 2022-03-29 PROCEDURE — 77014 HC CT GUIDANCE RADIATION THERAPY FLDS PLACEMENT: CPT | Mod: TC | Performed by: STUDENT IN AN ORGANIZED HEALTH CARE EDUCATION/TRAINING PROGRAM

## 2022-03-29 PROCEDURE — 77014 PR  CT GUIDANCE PLACEMENT RAD THERAPY FIELDS: CPT | Mod: 26,,, | Performed by: STUDENT IN AN ORGANIZED HEALTH CARE EDUCATION/TRAINING PROGRAM

## 2022-03-29 PROCEDURE — 77386 HC IMRT, COMPLEX: CPT | Performed by: STUDENT IN AN ORGANIZED HEALTH CARE EDUCATION/TRAINING PROGRAM

## 2022-03-30 ENCOUNTER — INFUSION (OUTPATIENT)
Dept: INFUSION THERAPY | Facility: OTHER | Age: 72
End: 2022-03-30
Attending: STUDENT IN AN ORGANIZED HEALTH CARE EDUCATION/TRAINING PROGRAM
Payer: MEDICARE

## 2022-03-30 ENCOUNTER — TELEPHONE (OUTPATIENT)
Dept: HEMATOLOGY/ONCOLOGY | Facility: CLINIC | Age: 72
End: 2022-03-30
Payer: MEDICARE

## 2022-03-30 VITALS
OXYGEN SATURATION: 96 % | TEMPERATURE: 98 F | SYSTOLIC BLOOD PRESSURE: 132 MMHG | RESPIRATION RATE: 16 BRPM | DIASTOLIC BLOOD PRESSURE: 93 MMHG | HEART RATE: 97 BPM

## 2022-03-30 DIAGNOSIS — C16.9 GASTRIC ADENOCARCINOMA: Primary | ICD-10-CM

## 2022-03-30 PROCEDURE — A4216 STERILE WATER/SALINE, 10 ML: HCPCS | Performed by: STUDENT IN AN ORGANIZED HEALTH CARE EDUCATION/TRAINING PROGRAM

## 2022-03-30 PROCEDURE — 77336 RADIATION PHYSICS CONSULT: CPT | Performed by: STUDENT IN AN ORGANIZED HEALTH CARE EDUCATION/TRAINING PROGRAM

## 2022-03-30 PROCEDURE — 63600175 PHARM REV CODE 636 W HCPCS: Performed by: STUDENT IN AN ORGANIZED HEALTH CARE EDUCATION/TRAINING PROGRAM

## 2022-03-30 PROCEDURE — 25000003 PHARM REV CODE 250: Performed by: STUDENT IN AN ORGANIZED HEALTH CARE EDUCATION/TRAINING PROGRAM

## 2022-03-30 RX ORDER — HEPARIN 100 UNIT/ML
500 SYRINGE INTRAVENOUS
Status: DISCONTINUED | OUTPATIENT
Start: 2022-03-30 | End: 2022-03-30 | Stop reason: HOSPADM

## 2022-03-30 RX ORDER — SODIUM CHLORIDE 0.9 % (FLUSH) 0.9 %
10 SYRINGE (ML) INJECTION
Status: DISCONTINUED | OUTPATIENT
Start: 2022-03-30 | End: 2022-03-30 | Stop reason: HOSPADM

## 2022-03-30 RX ADMIN — HEPARIN 500 UNITS: 100 SYRINGE at 01:03

## 2022-03-30 RX ADMIN — Medication 10 ML: at 01:03

## 2022-03-30 NOTE — TELEPHONE ENCOUNTER
Spoke with Ms. Renae, states she received wrong discharge paperwork. States she will discard them. Discussed her next follow up appointment date and time. No other questions asked.    ----- Message from Francis Zambrano sent at 3/30/2022  1:43 PM CDT -----  Regarding: Call BAck  Name of Who is Calling:DARCY RENAE [[id number]]              What is the request in detail: Patient received wrong discharge paper.with another patient information on it.  Please assist and contact patient.              Can the clinic reply by [facility]: No              What Number to Call Back if not in [facility]:[phone]

## 2022-04-07 ENCOUNTER — TELEPHONE (OUTPATIENT)
Dept: RADIATION ONCOLOGY | Facility: CLINIC | Age: 72
End: 2022-04-07
Payer: MEDICARE

## 2022-04-21 ENCOUNTER — OFFICE VISIT (OUTPATIENT)
Dept: RADIATION ONCOLOGY | Facility: CLINIC | Age: 72
End: 2022-04-21
Payer: MEDICARE

## 2022-04-21 VITALS
HEIGHT: 66 IN | BODY MASS INDEX: 45.16 KG/M2 | HEART RATE: 83 BPM | WEIGHT: 281 LBS | RESPIRATION RATE: 16 BRPM | SYSTOLIC BLOOD PRESSURE: 109 MMHG | DIASTOLIC BLOOD PRESSURE: 59 MMHG

## 2022-04-21 DIAGNOSIS — C16.9 GASTRIC ADENOCARCINOMA: Primary | ICD-10-CM

## 2022-04-21 PROCEDURE — 1126F PR PAIN SEVERITY QUANTIFIED, NO PAIN PRESENT: ICD-10-PCS | Mod: CPTII,S$GLB,, | Performed by: STUDENT IN AN ORGANIZED HEALTH CARE EDUCATION/TRAINING PROGRAM

## 2022-04-21 PROCEDURE — 3078F DIAST BP <80 MM HG: CPT | Mod: CPTII,S$GLB,, | Performed by: STUDENT IN AN ORGANIZED HEALTH CARE EDUCATION/TRAINING PROGRAM

## 2022-04-21 PROCEDURE — 1159F PR MEDICATION LIST DOCUMENTED IN MEDICAL RECORD: ICD-10-PCS | Mod: CPTII,S$GLB,, | Performed by: STUDENT IN AN ORGANIZED HEALTH CARE EDUCATION/TRAINING PROGRAM

## 2022-04-21 PROCEDURE — 3074F PR MOST RECENT SYSTOLIC BLOOD PRESSURE < 130 MM HG: ICD-10-PCS | Mod: CPTII,S$GLB,, | Performed by: STUDENT IN AN ORGANIZED HEALTH CARE EDUCATION/TRAINING PROGRAM

## 2022-04-21 PROCEDURE — 3074F SYST BP LT 130 MM HG: CPT | Mod: CPTII,S$GLB,, | Performed by: STUDENT IN AN ORGANIZED HEALTH CARE EDUCATION/TRAINING PROGRAM

## 2022-04-21 PROCEDURE — 3008F BODY MASS INDEX DOCD: CPT | Mod: CPTII,S$GLB,, | Performed by: STUDENT IN AN ORGANIZED HEALTH CARE EDUCATION/TRAINING PROGRAM

## 2022-04-21 PROCEDURE — 99214 PR OFFICE/OUTPT VISIT, EST, LEVL IV, 30-39 MIN: ICD-10-PCS | Mod: S$GLB,,, | Performed by: STUDENT IN AN ORGANIZED HEALTH CARE EDUCATION/TRAINING PROGRAM

## 2022-04-21 PROCEDURE — 99999 PR PBB SHADOW E&M-EST. PATIENT-LVL IV: CPT | Mod: PBBFAC,,, | Performed by: STUDENT IN AN ORGANIZED HEALTH CARE EDUCATION/TRAINING PROGRAM

## 2022-04-21 PROCEDURE — 4010F PR ACE/ARB THEARPY RXD/TAKEN: ICD-10-PCS | Mod: CPTII,S$GLB,, | Performed by: STUDENT IN AN ORGANIZED HEALTH CARE EDUCATION/TRAINING PROGRAM

## 2022-04-21 PROCEDURE — 3078F PR MOST RECENT DIASTOLIC BLOOD PRESSURE < 80 MM HG: ICD-10-PCS | Mod: CPTII,S$GLB,, | Performed by: STUDENT IN AN ORGANIZED HEALTH CARE EDUCATION/TRAINING PROGRAM

## 2022-04-21 PROCEDURE — 3008F PR BODY MASS INDEX (BMI) DOCUMENTED: ICD-10-PCS | Mod: CPTII,S$GLB,, | Performed by: STUDENT IN AN ORGANIZED HEALTH CARE EDUCATION/TRAINING PROGRAM

## 2022-04-21 PROCEDURE — 1159F MED LIST DOCD IN RCRD: CPT | Mod: CPTII,S$GLB,, | Performed by: STUDENT IN AN ORGANIZED HEALTH CARE EDUCATION/TRAINING PROGRAM

## 2022-04-21 PROCEDURE — 99999 PR PBB SHADOW E&M-EST. PATIENT-LVL IV: ICD-10-PCS | Mod: PBBFAC,,, | Performed by: STUDENT IN AN ORGANIZED HEALTH CARE EDUCATION/TRAINING PROGRAM

## 2022-04-21 PROCEDURE — 99214 OFFICE O/P EST MOD 30 MIN: CPT | Mod: S$GLB,,, | Performed by: STUDENT IN AN ORGANIZED HEALTH CARE EDUCATION/TRAINING PROGRAM

## 2022-04-21 PROCEDURE — 4010F ACE/ARB THERAPY RXD/TAKEN: CPT | Mod: CPTII,S$GLB,, | Performed by: STUDENT IN AN ORGANIZED HEALTH CARE EDUCATION/TRAINING PROGRAM

## 2022-04-21 PROCEDURE — 1126F AMNT PAIN NOTED NONE PRSNT: CPT | Mod: CPTII,S$GLB,, | Performed by: STUDENT IN AN ORGANIZED HEALTH CARE EDUCATION/TRAINING PROGRAM
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
## 2022-04-22 ENCOUNTER — HOSPITAL ENCOUNTER (OUTPATIENT)
Facility: OTHER | Age: 72
Discharge: HOME | End: 2022-04-22
Attending: ANESTHESIOLOGY | Admitting: ANESTHESIOLOGY
Payer: MEDICARE

## 2022-04-22 VITALS
HEART RATE: 66 BPM | HEIGHT: 66 IN | SYSTOLIC BLOOD PRESSURE: 103 MMHG | BODY MASS INDEX: 45.16 KG/M2 | WEIGHT: 281 LBS | OXYGEN SATURATION: 94 % | TEMPERATURE: 98 F | RESPIRATION RATE: 18 BRPM | DIASTOLIC BLOOD PRESSURE: 55 MMHG

## 2022-04-22 DIAGNOSIS — M17.12 PRIMARY OSTEOARTHRITIS OF LEFT KNEE: ICD-10-CM

## 2022-04-22 DIAGNOSIS — G89.29 CHRONIC PAIN OF LEFT KNEE: Primary | ICD-10-CM

## 2022-04-22 DIAGNOSIS — M25.562 CHRONIC PAIN OF LEFT KNEE: Primary | ICD-10-CM

## 2022-04-22 DIAGNOSIS — G89.29 CHRONIC PAIN: ICD-10-CM

## 2022-04-22 PROCEDURE — 64624 DSTRJ NULYT AGT GNCLR NRV: CPT | Mod: LT,,, | Performed by: ANESTHESIOLOGY

## 2022-04-22 PROCEDURE — 99152 PR MOD CONSCIOUS SEDATION, SAME PHYS, 5+ YRS, FIRST 15 MIN: ICD-10-PCS | Mod: ,,, | Performed by: ANESTHESIOLOGY

## 2022-04-22 PROCEDURE — 64624 DSTRJ NULYT AGT GNCLR NRV: CPT | Mod: LT | Performed by: ANESTHESIOLOGY

## 2022-04-22 PROCEDURE — 99152 MOD SED SAME PHYS/QHP 5/>YRS: CPT | Mod: ,,, | Performed by: ANESTHESIOLOGY

## 2022-04-22 PROCEDURE — 64624 PR DESTRUCT, NEUROLYTIC AGENT, GENICULAR NERVE, W/IMG: ICD-10-PCS | Mod: LT,,, | Performed by: ANESTHESIOLOGY

## 2022-04-22 PROCEDURE — 25000003 PHARM REV CODE 250: Performed by: ANESTHESIOLOGY

## 2022-04-22 PROCEDURE — 25000003 PHARM REV CODE 250: Performed by: STUDENT IN AN ORGANIZED HEALTH CARE EDUCATION/TRAINING PROGRAM

## 2022-04-22 PROCEDURE — 63600175 PHARM REV CODE 636 W HCPCS: Performed by: ANESTHESIOLOGY

## 2022-04-22 PROCEDURE — 99152 MOD SED SAME PHYS/QHP 5/>YRS: CPT | Performed by: ANESTHESIOLOGY

## 2022-04-22 PROCEDURE — A4649 SURGICAL SUPPLIES: HCPCS | Performed by: ANESTHESIOLOGY

## 2022-04-22 RX ORDER — TRIAMCINOLONE ACETONIDE 40 MG/ML
INJECTION, SUSPENSION INTRA-ARTICULAR; INTRAMUSCULAR
Status: DISCONTINUED | OUTPATIENT
Start: 2022-04-22 | End: 2022-04-22 | Stop reason: HOSPADM

## 2022-04-22 RX ORDER — FENTANYL CITRATE 50 UG/ML
INJECTION, SOLUTION INTRAMUSCULAR; INTRAVENOUS
Status: DISCONTINUED | OUTPATIENT
Start: 2022-04-22 | End: 2022-04-22 | Stop reason: HOSPADM

## 2022-04-22 RX ORDER — SODIUM CHLORIDE 9 MG/ML
500 INJECTION, SOLUTION INTRAVENOUS CONTINUOUS
Status: DISCONTINUED | OUTPATIENT
Start: 2022-04-22 | End: 2022-04-22 | Stop reason: HOSPADM

## 2022-04-22 RX ORDER — OXYCODONE AND ACETAMINOPHEN 5; 325 MG/1; MG/1
1 TABLET ORAL EVERY 8 HOURS PRN
Qty: 30 TABLET | Refills: 0 | Status: SHIPPED | OUTPATIENT
Start: 2022-04-22 | End: 2022-04-25 | Stop reason: SDUPTHER

## 2022-04-22 RX ORDER — MIDAZOLAM HYDROCHLORIDE 1 MG/ML
INJECTION INTRAMUSCULAR; INTRAVENOUS
Status: DISCONTINUED | OUTPATIENT
Start: 2022-04-22 | End: 2022-04-22 | Stop reason: HOSPADM

## 2022-04-22 RX ORDER — BUPIVACAINE HYDROCHLORIDE 2.5 MG/ML
INJECTION, SOLUTION EPIDURAL; INFILTRATION; INTRACAUDAL
Status: DISCONTINUED | OUTPATIENT
Start: 2022-04-22 | End: 2022-04-22 | Stop reason: HOSPADM

## 2022-04-22 RX ORDER — LIDOCAINE HYDROCHLORIDE 20 MG/ML
INJECTION, SOLUTION INFILTRATION; PERINEURAL
Status: DISCONTINUED | OUTPATIENT
Start: 2022-04-22 | End: 2022-04-22 | Stop reason: HOSPADM

## 2022-04-22 NOTE — TELEPHONE ENCOUNTER
Patient requesting refill on Oxycodone 5-325mg  Last office visit 03/21/22   shows last refill on 03/21/22  Patient does have a pain contract on file with [facility]  Patient last UDS 10/04/2019 was consistent with current therapy    CODEINE  Not Detected    MORPHINE  Not Detected    6-ACETYLMORPHINE  Not Detected    OXYCODONE  Present    NOROYXCODONE  Present    OXYMORPHONE  Not Detected    NOROXYMORPHONE  Not Detected    HYDROCODONE  Not Detected    NORHYDROCODONE  Not Detected    HYDROMORPHONE  Not Detected    BUPRENORPHINE  Not Detected    NORUBPRENORPHINE  Not Detected    FENTANYL  Not Detected    NORFENTANYL  Not Detected    MEPERIDINE METABOLITE  Not Detected    TAPENTADOL  Not Detected    TAPENTADOL-O-SULF  Not Detected    METHADONE  Not Detected    PROPOXYPHENE  Not Detected    TRAMADOL  Not Detected    AMPHETAMINE  Not Detected    METHAMPHETAMINE  Not Detected    MDMA- ECSTASY  Not Detected    MDA  Not Detected    MDEA- Genoveva  Not Detected    METHYLPHENIDATE  Not Detected    PHENTERMINE  Not Detected    BENZOYLECGONINE  Not Detected    ALPRAZOLAM  Not Detected    ALPHA-OH-ALPRAZOLAM  Not Detected    CLONAZEPAM  Not Detected    7-AMINOCLONAZEPAM  Not Detected    DIAZEPAM  Not Detected    NORDIAZEPAM  Not Detected    OXAZEPAM  Not Detected    TEMAZEPAM  Not Detected    Lorazepam  Not Detected    MIDAZOLAM  Not Detected    ZOLPIDEM  Not Detected    BARBITURATES  Not Detected    Creatinine, Urine 20.0 - 400.0 mg/dL 102.0    ETHYL GLUCURONIDE  Not Detected    MARIJUANA METABOLITE  Not Detected    PCP  Not Detected    CARISOPRODOL  Not Detected    Comment: The carisoprodol immunoassay has cross-reactivity to

## 2022-04-22 NOTE — OP NOTE
Therapeutic Genicular Cooled Nerve Radiofrequency Ablation under Fluoroscopy     The procedure, risks, benefits, and options were discussed with the patient. There are no contraindications to the procedure. The patent expressed understanding and agreed to the procedure. Informed written consent was obtained prior to the start of the procedure and can be found in the patient's chart.        PATIENT NAME: Ms. DARCY Coats   MRN: [id number]     DATE OF PROCEDURE: 04/22/2022     PROCEDURE: Therapeutic Left Genicular Cooled Nerve Radiofrequency Ablation under Fluoroscopy    PRE-OP DIAGNOSIS: Chronic pain of left knee [M25.562, G89.29]    POST-OP DIAGNOSIS: Chronic pain of left knee [M25.562, G89.29]    PHYSICIAN: Tevin Clark MD    ASSISTANTS: Dr. Krishnamurthy    MEDICATIONS INJECTED:  Preservative-free Kenalog 40mg with 9cc of Bupivicaine 0.25%    LOCAL ANESTHETIC INJECTED:   Xylocaine 2%    SEDATION:   Versed 2mg and Fentanyl 50mcg                                                                                                                                                                                     Conscious sedation ordered by MDAVIN. Patient re-evaluation prior to administration of conscious sedation. No changes noted in patient's status from initial evaluation. The patient's vital signs were monitored by RN and patient remained hemodynamically stable throughout the procedure.    Event Time In   Sedation Start 0829   Sedation End 0847       ESTIMATED BLOOD LOSS:  None    COMPLICATIONS:  None     INTERVAL HISTORY: Patient has clinical findings of chronic knee pain. Patients has completed 2 previous diagnostic genicular nerve blocks with at least 80% relief for the expected duration of the local anesthetic utilized.     TECHNIQUE: Time-out was performed to identify the patient and procedure to be performed. With the patient laying in a supine position, the surgical area was prepped and draped in the usual sterile  fashion using ChloraPrep and fenestrated drape. Three target sites including the superior lateral genicular nerve where the lateral femoral shaft meets the epicondyle, the superior medial genicular nerve where the medial femoral shaft meets the epicondyle, and the inferior medial genicular nerve where the medial tibial shaft meets the epicondyle, were determined under fluoroscopic guidance. Skin anesthesia was achieved by injecting Lidocaine 2% over the injection sites. A 17 gauge, 50mm, 10mm active tip needle was then advanced under fluoroscopy in the AP and lateral views into the positions of the geniculate nerves at these levels. This was followed by motor testing at each of the nerves to ensure that there was no dorsiflexion of the foot. After negative aspiration for blood was confirmed, 1 mL of the lidocaine 2% listed above was injected slowly at each site. This was followed by cooled thermal lesioning at 80 degrees celsius for 150 seconds at each site. That was followed by slowly injecting 3 mL of the medication mixture listed above at each site. The needles were removed and bleeding was nil. A sterile dressing was applied. No specimens collected. The patient tolerated the procedure well and did not have any procedure related motor deficit at the conclusion of the procedure.    The patient was monitored after the procedure in the recovery area. They were given post-procedure and discharge instructions to follow at home. The patient was discharged in a stable condition.    I reviewed and edited the fellow's note. I conducted my own interview and physical examination. I agree with the findings. I was present and supervising all critical portions of the procedure.    Tevin Clark MD

## 2022-04-22 NOTE — TELEPHONE ENCOUNTER
"----- Message from Deandra Barron sent at 4/22/2022 10:28 AM CDT -----  Regarding: Medication   Refill  Request              Name and Strength:     oxyCODONE-acetaminophen (PERCOCET) 5-325 mg per tablet    How is the patient currently taking: Sig - Route: Take 1 tablet by mouth every 8 (eight) hours as needed for Pain. - Oral    Dispense:  30 tablet     Preferred Pharmacy with phone number:     [facility] - [address]     Phone: [phone]  Fax:  [phone]    Local Order:    Yes     Ordering Provider:  [facility] Ordering/Authorizing: Provider: Savanna Hyatt NP    Would the patient rather a call back or a response via [facility]?  No     Preferred Call Back:  [phone](C)        Additional  Notes:   Patient states, "she's completely out of medication  ."       "

## 2022-04-22 NOTE — H&P
HPI:Patient presents for scheduled procedure.        Past Medical History:   Diagnosis Date    YOUNG (acute kidney injury) 10/15/2021    Anemia     2018    Arthritis     BMI 60.0-69.9, adult     Cataract     Chronic diastolic congestive heart failure 1/27/2021    Depression     Dry eye syndrome     Gastric adenocarcinoma 5/25/2021    GERD (gastroesophageal reflux disease)     Glaucoma suspect     History of gastric ulcer     Hyperlipidemia     Hypertension     Hypothyroidism     Morbid obesity     Neuromuscular disorder     Sleep apnea     Thyroid disease      Past Surgical History:   Procedure Laterality Date    APPENDECTOMY      CARDIAC SURGERY      CATARACT EXTRACTION W/  INTRAOCULAR LENS IMPLANT Bilateral     MFIOL OU    CORONARY ANGIOGRAPHY Bilateral 2/24/2021    Procedure: ANGIOGRAM, CORONARY ARTERY;  Surgeon: Cresencio Ridley MD;  Location: [facility] CATH LAB;  Service: Cardiology;  Laterality: Bilateral;  Covid test needed - ok per Danay SSCU. Pt. w/o transportation or family    ENDOSCOPIC ULTRASOUND OF UPPER GASTROINTESTINAL TRACT N/A 3/17/2021    Procedure: ULTRASOUND, UPPER GI TRACT, ENDOSCOPIC;  Surgeon: Gerald Anaya MD;  Location: [facility] (2ND FLR);  Service: Endoscopy;  Laterality: N/A;  Pt unable to get a ride for swab. Will do rapid test at 8:30 - ttr    ENDOSCOPIC ULTRASOUND OF UPPER GASTROINTESTINAL TRACT N/A 1/13/2022    Procedure: ULTRASOUND, UPPER GI TRACT, ENDOSCOPIC;  Surgeon: Kevin Carballo MD;  Location: [facility] (2ND FLR);  Service: Endoscopy;  Laterality: N/A;  blood thinner approval received, see telephone encounter 1/7/22-BB  rapid  instructions given verbally and sent to address on file in pt's chart-BB    ESOPHAGOGASTRODUODENOSCOPY N/A 2/5/2021    Procedure: EGD (ESOPHAGOGASTRODUODENOSCOPY);  Surgeon: Matthew Hernadez MD;  Location: [facility] ENDO (2ND FLR);  Service: Endoscopy;  Laterality: N/A;  BMI-58    Wt: 361#     COVID test at W on 2/2-GT     ESOPHAGOGASTRODUODENOSCOPY N/A 3/17/2021    Procedure: EGD (ESOPHAGOGASTRODUODENOSCOPY);  Surgeon: Gerald Anaya MD;  Location: [facility] ENDO (2ND FLR);  Service: Endoscopy;  Laterality: N/A;    ESOPHAGOGASTRODUODENOSCOPY N/A 5/25/2021    Procedure: EGD (ESOPHAGOGASTRODUODENOSCOPY);  Surgeon: Kevin Carballo MD;  Location: [facility] ENDO (2ND FLR);  Service: Endoscopy;  Laterality: N/A;  ESD 2 hours  Full COVID vaccination on file. ttr    ESOPHAGOGASTRODUODENOSCOPY N/A 1/13/2022    Procedure: EGD (ESOPHAGOGASTRODUODENOSCOPY);  Surgeon: Kevin Carballo MD;  Location: [facility] ENDO (2ND FLR);  Service: Endoscopy;  Laterality: N/A;  blood thinner approval, see telephone encounter 1/7/22-BB  rapid  instructions given verbally and sent to address on file in pt's chart-BB    EYE SURGERY      INJECTION OF ANESTHETIC AGENT AROUND NERVE Left 7/10/2018    Procedure: BLOCK, NERVE;  Surgeon: Samantha Vidal MD;  Location: [facility] PAIN MGT;  Service: Pain Management;  Laterality: Left;  Genicular     INJECTION OF ANESTHETIC AGENT AROUND NERVE Left 7/19/2019    Procedure: Block, Nerve NERVE BLOCK GENICULAR WITH PHENOL 6%;  Surgeon: Samantha Vidal MD;  Location: [facility] PAIN MGT;  Service: Pain Management;  Laterality: Left;  NEEDS CONSENT    INSERTION OF TUNNELED CENTRAL VENOUS CATHETER (CVC) WITH SUBCUTANEOUS PORT N/A 7/9/2021    Procedure: INSERTION, PORT-A-CATH;  Surgeon: Mario Paz MD;  Location: [facility] CATH LAB;  Service: Radiology;  Laterality: N/A;  PORT PLACEMENT    RADIOFREQUENCY ABLATION Left 6/19/2020    Procedure: RADIOFREQUENCY ABLATION LEFT GENICULAR;  Surgeon: Tevin Clark MD;  Location: [facility] PAIN MGT;  Service: Pain Management;  Laterality: Left;  Left Genicular RFA    RADIOFREQUENCY ABLATION Left 1/22/2021    Procedure: RADIOFREQUENCY ABLATION LEFT GENICULAR;  Surgeon: Tevin Clark MD;  Location: [facility] PAIN MGT;  Service: Pain Management;  Laterality: Left;    RADIOFREQUENCY ABLATION Left 7/30/2021     "Procedure: RADIOFREQUENCY ABLATION, GENICULAR COOLED NEED CONSENT;  Surgeon: Tevin Clark MD;  Location: [facility];  Service: Pain Management;  Laterality: Left;    TONSILLECTOMY       Review of patient's allergies indicates:  No Known Allergies     PMHx, PSHx, Allergies, Medications reviewed in epic      ROS negative except pain complaints in HPI    OBJECTIVE:    BP (!) 110/55 (BP Location: Right arm, Patient Position: Lying)   Pulse 86   Temp 97.8 °F (36.6 °C) (Oral)   Resp 16   Ht 5' 6" (1.676 m)   Wt 127.5 kg (281 lb)   SpO2 96%   Breastfeeding No   BMI 45.35 kg/m²     PHYSICAL EXAMINATION:    GENERAL: Well appearing, in no acute distress, alert and oriented x3.  PSYCH:  Mood and affect appropriate.  SKIN: Skin color, texture, turgor normal, no rashes or lesions.  CV: RRR with palpation of the radial artery.  PULM: No evidence of respiratory difficulty, symmetric chest rise. Clear to auscultation.  NEURO: Cranial nerves grossly intact.    Plan:    Proceed with procedure as planned    Hannah Krishnamurthy  04/22/2022    "

## 2022-04-22 NOTE — DISCHARGE INSTRUCTIONS
Thank you for allowing us to care for you today. You may receive a survey about the care we provided. Your feedback is valuable and helps us provide excellent care throughout the community.     Home Care Instructions for Pain Management:    1. DIET:   You may resume your normal diet today.   2. BATHING:   You may shower with luke warm water. No tub baths or anything that will soak injection sites under water for the next 24 hours.  3. DRESSING:   You may remove your bandage today.   4. ACTIVITY LEVEL:   You may resume your normal activities 24 hrs after your procedure. Nothing strenuous today.  5. MEDICATIONS:   You may resume your normal medications today. To restart blood thinners, ask your doctor.  6. DRIVING    If you have received any sedatives by mouth today, you may not drive for 12 hours.    If you have received any sedation through your IV, you may not drive for 24 hrs.   7. SPECIAL INSTRUCTIONS:   No heat to the injection site for 24 hrs including, hot bath or shower, heating pad, moist heat, or hot tubs.    Use ice pack to injection site for any pain or discomfort.  Apply ice packs for 20 minute intervals as needed.    IF you have diabetes, be sure to monitor your blood sugar more closely. IF your injection contained steroids your blood sugar levels may become higher than normal.    If you are still having pain upon discharge:  Your pain may improve over the next 48 hours. The anesthetic (numbing medication) works immediately to 48 hours. IF your injection contained a steroid (anti-inflammatory medication), it takes approximately 3 days to start feeling relief and 7-10 days to see your greatest results from the medication. It is possible you may need subsequent injections. This would be discussed at your follow up appointment with pain management or your referring doctor.    Please call the PAIN MANAGEMENT office at [phone] or ON CALL pager at [phone] if you experienced any:   -Weakness or  loss of sensation  -Fever > 101.5  -Pain uncontrolled with oral medications   -Persistent nausea, vomiting, or diarrhea  -Redness or drainage from the injection sites, or any other worrisome concerns.   If physician on call was not reached or could not communicate with our office for any reason please go to the nearest emergency department. Adult Procedural Sedation Instructions    Recovery After Procedural Sedation (Adult)  You have been given medicine by vein to make you sleep during your surgery. This may have included both a pain medicine and sleeping medicine. Most of the effects have worn off. But you may still have some drowsiness for the next 6 to 8 hours.  Home care  Follow these guidelines when you get home:  For the next 8 hours, you should be watched by a responsible adult. This person should make sure your condition is not getting worse.  Don't drink any alcohol for the next 24 hours.  Don't drive, operate dangerous machinery, or make important business or personal decisions during the next 24 hours.  Note: Your healthcare provider may tell you not to take any medicine by mouth for pain or sleep in the next 4 hours. These medicines may react with the medicines you were given in the hospital. This could cause a much stronger response than usual.  Follow-up care  Follow up with your healthcare provider if you are not alert and back to your usual level of activity within 12 hours.  When to seek medical advice  Call your healthcare provider right away if any of these occur:  Drowsiness gets worse  Weakness or dizziness gets worse  Repeated vomiting  You can't be awakened   Date Last Reviewed: 10/18/2016  © 1578-0034 [employer]. [address] [place] [address], [place] [address]. All rights reserved. This information is not intended as a substitute for professional medical care. Always follow your healthcare professional's instructions.

## 2022-04-22 NOTE — DISCHARGE SUMMARY
Discharge Note  Short Stay      SUMMARY     Admit Date: 4/22/2022    Attending Physician: Tevin Clark      Discharge Physician: Tevin Clark      Discharge Date: 4/22/2022 8:48 AM    Procedure(s) (LRB):  RADIOFREQUENCY ABLATION, GENICULAR COOLED , LEFT (Left)    Final Diagnosis: Chronic pain of left knee [M25.562, G89.29]    Disposition: Home or self care    Patient Instructions:   Current Discharge Medication List      CONTINUE these medications which have NOT CHANGED    Details   acetaminophen (TYLENOL) 500 MG tablet       amLODIPine (NORVASC) 10 MG tablet TAKE 1 TABLET ONE TIME DAILY  Qty: 90 tablet, Refills: 1    Associated Diagnoses: Essential hypertension      atorvastatin (LIPITOR) 20 MG tablet TAKE 1 TABLET ONE TIME DAILY  Qty: 90 tablet, Refills: 1    Associated Diagnoses: Mixed hyperlipidemia      B-complex with vitamin C (Z-BEC OR EQUIV) tablet Take 1 tablet by mouth once daily.       CALCIUM CITRATE-VITAMIN D2 ORAL Take 1 tablet by mouth once daily.       citalopram (CELEXA) 10 MG tablet Take 1 tablet (10 mg total) by mouth once daily.  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  Qty: 90 tablet, Refills: 11    Comments: NEW RX REQUEST FOR PATIENT DUE TO USING NEW MAIL ORDER PHARMACY-[facility]      furosemide (LASIX) 80 MG tablet Take 1 tablet (80 mg total) by mouth 2 (two) times a day.  Qty: 60 tablet, Refills: 11      gabapentin (NEURONTIN) 300 MG capsule Take 2 capsules (600 mg total) by mouth 3 (three) times daily.  Qty: 540 capsule, Refills: 2    Associated Diagnoses: Primary osteoarthritis of left knee; Chronic pain of left knee; Chronic pain disorder      lactulose (CHRONULAC) 10 gram/15 mL solution Take  30 mLs (20 g total) by mouth 2 (two) times daily as needed (constipation).  Qty: 120 mL, Refills: 1    Associated Diagnoses: Gastric adenocarcinoma; Constipation, unspecified constipation type      LIDOcaine-prilocaine (EMLA) cream Apply topically as needed (prior to port access).  Qty: 30 g, Refills: 0    Associated Diagnoses: Gastric adenocarcinoma      lisinopriL (PRINIVIL,ZESTRIL) 40 MG tablet TAKE 1 TABLET ONE TIME DAILY  Qty: 90 tablet, Refills: 1    Associated Diagnoses: Essential hypertension      multivitamin with minerals tablet Take 1 tablet by mouth once daily.       pantoprazole (PROTONIX) 40 MG tablet Take 1 tablet (40 mg total) by mouth Daily.  Qty: 90 tablet, Refills: 1      semaglutide (OZEMPIC) 1 mg/dose (2 mg/1.5 mL) PnIj Inject 1 mg into the skin every 7 days.  Qty: 2 pen, Refills: 3                 Discharge Diagnosis: Chronic pain of left knee [M25.562, G89.29]  Condition on Discharge: Stable with no complications to procedure   Diet on Discharge: Same as before.  Activity: as per instruction sheet.  Discharge to: Home with a responsible adult.  Follow up: 2-4 weeks

## 2022-04-25 ENCOUNTER — LAB VISIT (OUTPATIENT)
Dept: LAB | Facility: OTHER | Age: 72
End: 2022-04-25
Attending: STUDENT IN AN ORGANIZED HEALTH CARE EDUCATION/TRAINING PROGRAM
Payer: MEDICARE

## 2022-04-25 ENCOUNTER — TELEPHONE (OUTPATIENT)
Dept: INFUSION THERAPY | Facility: HOSPITAL | Age: 72
End: 2022-04-25
Payer: MEDICARE

## 2022-04-25 ENCOUNTER — TELEPHONE (OUTPATIENT)
Dept: PAIN MEDICINE | Facility: CLINIC | Age: 72
End: 2022-04-25
Payer: MEDICARE

## 2022-04-25 ENCOUNTER — OFFICE VISIT (OUTPATIENT)
Dept: HEMATOLOGY/ONCOLOGY | Facility: CLINIC | Age: 72
End: 2022-04-25
Payer: MEDICARE

## 2022-04-25 VITALS
RESPIRATION RATE: 14 BRPM | HEIGHT: 66 IN | SYSTOLIC BLOOD PRESSURE: 118 MMHG | WEIGHT: 281.31 LBS | TEMPERATURE: 98 F | DIASTOLIC BLOOD PRESSURE: 65 MMHG | BODY MASS INDEX: 45.21 KG/M2 | HEART RATE: 74 BPM | OXYGEN SATURATION: 98 %

## 2022-04-25 DIAGNOSIS — C16.9 GASTRIC ADENOCARCINOMA: ICD-10-CM

## 2022-04-25 DIAGNOSIS — G89.29 CHRONIC PAIN OF LEFT KNEE: ICD-10-CM

## 2022-04-25 DIAGNOSIS — E66.01 MORBID OBESITY: ICD-10-CM

## 2022-04-25 DIAGNOSIS — M25.562 CHRONIC PAIN OF LEFT KNEE: ICD-10-CM

## 2022-04-25 DIAGNOSIS — Z86.718 HISTORY OF DVT (DEEP VEIN THROMBOSIS): ICD-10-CM

## 2022-04-25 DIAGNOSIS — I10 ESSENTIAL HYPERTENSION: ICD-10-CM

## 2022-04-25 DIAGNOSIS — C16.9 GASTRIC ADENOCARCINOMA: Primary | ICD-10-CM

## 2022-04-25 DIAGNOSIS — I50.32 CHRONIC DIASTOLIC CONGESTIVE HEART FAILURE: ICD-10-CM

## 2022-04-25 LAB
ALBUMIN SERPL BCP-MCNC: 3.2 G/DL (ref 3.5–5.2)
ALP SERPL-CCNC: 103 U/L (ref 55–135)
ALT SERPL W/O P-5'-P-CCNC: 20 U/L (ref 10–44)
ANION GAP SERPL CALC-SCNC: 11 MMOL/L (ref 8–16)
AST SERPL-CCNC: 25 U/L (ref 10–40)
BILIRUB SERPL-MCNC: 0.7 MG/DL (ref 0.1–1)
BUN SERPL-MCNC: 13 MG/DL (ref 8–23)
CALCIUM SERPL-MCNC: 9.4 MG/DL (ref 8.7–10.5)
CHLORIDE SERPL-SCNC: 108 MMOL/L (ref 95–110)
CO2 SERPL-SCNC: 23 MMOL/L (ref 23–29)
CREAT SERPL-MCNC: 0.7 MG/DL (ref 0.5–1.4)
ERYTHROCYTE [DISTWIDTH] IN BLOOD BY AUTOMATED COUNT: 14.4 % (ref 11.5–14.5)
EST. GFR  (AFRICAN AMERICAN): >60 ML/MIN/1.73 M^2
EST. GFR  (NON AFRICAN AMERICAN): >60 ML/MIN/1.73 M^2
GLUCOSE SERPL-MCNC: 97 MG/DL (ref 70–110)
HCT VFR BLD AUTO: 38 % (ref 37–48.5)
HGB BLD-MCNC: 12.4 G/DL (ref 12–16)
IMM GRANULOCYTES # BLD AUTO: 0.04 K/UL (ref 0–0.04)
MCH RBC QN AUTO: 34.2 PG (ref 27–31)
MCHC RBC AUTO-ENTMCNC: 32.6 G/DL (ref 32–36)
MCV RBC AUTO: 105 FL (ref 82–98)
NEUTROPHILS # BLD AUTO: 5 K/UL (ref 1.8–7.7)
PLATELET # BLD AUTO: 199 K/UL (ref 150–450)
PMV BLD AUTO: 10 FL (ref 9.2–12.9)
POTASSIUM SERPL-SCNC: 4.1 MMOL/L (ref 3.5–5.1)
PROT SERPL-MCNC: 6.2 G/DL (ref 6–8.4)
RBC # BLD AUTO: 3.63 M/UL (ref 4–5.4)
SODIUM SERPL-SCNC: 142 MMOL/L (ref 136–145)
WBC # BLD AUTO: 7.24 K/UL (ref 3.9–12.7)

## 2022-04-25 PROCEDURE — 3078F DIAST BP <80 MM HG: CPT | Mod: CPTII,S$GLB,, | Performed by: STUDENT IN AN ORGANIZED HEALTH CARE EDUCATION/TRAINING PROGRAM

## 2022-04-25 PROCEDURE — 99999 PR PBB SHADOW E&M-EST. PATIENT-LVL IV: ICD-10-PCS | Mod: PBBFAC,,, | Performed by: STUDENT IN AN ORGANIZED HEALTH CARE EDUCATION/TRAINING PROGRAM

## 2022-04-25 PROCEDURE — 1160F RVW MEDS BY RX/DR IN RCRD: CPT | Mod: CPTII,S$GLB,, | Performed by: STUDENT IN AN ORGANIZED HEALTH CARE EDUCATION/TRAINING PROGRAM

## 2022-04-25 PROCEDURE — 3008F BODY MASS INDEX DOCD: CPT | Mod: CPTII,S$GLB,, | Performed by: STUDENT IN AN ORGANIZED HEALTH CARE EDUCATION/TRAINING PROGRAM

## 2022-04-25 PROCEDURE — 1159F MED LIST DOCD IN RCRD: CPT | Mod: CPTII,S$GLB,, | Performed by: STUDENT IN AN ORGANIZED HEALTH CARE EDUCATION/TRAINING PROGRAM

## 2022-04-25 PROCEDURE — 3074F PR MOST RECENT SYSTOLIC BLOOD PRESSURE < 130 MM HG: ICD-10-PCS | Mod: CPTII,S$GLB,, | Performed by: STUDENT IN AN ORGANIZED HEALTH CARE EDUCATION/TRAINING PROGRAM

## 2022-04-25 PROCEDURE — 99999 PR PBB SHADOW E&M-EST. PATIENT-LVL IV: CPT | Mod: PBBFAC,,, | Performed by: STUDENT IN AN ORGANIZED HEALTH CARE EDUCATION/TRAINING PROGRAM

## 2022-04-25 PROCEDURE — 3008F PR BODY MASS INDEX (BMI) DOCUMENTED: ICD-10-PCS | Mod: CPTII,S$GLB,, | Performed by: STUDENT IN AN ORGANIZED HEALTH CARE EDUCATION/TRAINING PROGRAM

## 2022-04-25 PROCEDURE — 85027 COMPLETE CBC AUTOMATED: CPT | Performed by: STUDENT IN AN ORGANIZED HEALTH CARE EDUCATION/TRAINING PROGRAM

## 2022-04-25 PROCEDURE — 3074F SYST BP LT 130 MM HG: CPT | Mod: CPTII,S$GLB,, | Performed by: STUDENT IN AN ORGANIZED HEALTH CARE EDUCATION/TRAINING PROGRAM

## 2022-04-25 PROCEDURE — 80053 COMPREHEN METABOLIC PANEL: CPT | Performed by: STUDENT IN AN ORGANIZED HEALTH CARE EDUCATION/TRAINING PROGRAM

## 2022-04-25 PROCEDURE — 4010F PR ACE/ARB THEARPY RXD/TAKEN: ICD-10-PCS | Mod: CPTII,S$GLB,, | Performed by: STUDENT IN AN ORGANIZED HEALTH CARE EDUCATION/TRAINING PROGRAM

## 2022-04-25 PROCEDURE — 1126F AMNT PAIN NOTED NONE PRSNT: CPT | Mod: CPTII,S$GLB,, | Performed by: STUDENT IN AN ORGANIZED HEALTH CARE EDUCATION/TRAINING PROGRAM

## 2022-04-25 PROCEDURE — 99499 UNLISTED E&M SERVICE: CPT | Mod: S$GLB,,, | Performed by: STUDENT IN AN ORGANIZED HEALTH CARE EDUCATION/TRAINING PROGRAM

## 2022-04-25 PROCEDURE — 99214 OFFICE O/P EST MOD 30 MIN: CPT | Mod: S$GLB,,, | Performed by: STUDENT IN AN ORGANIZED HEALTH CARE EDUCATION/TRAINING PROGRAM

## 2022-04-25 PROCEDURE — 99499 RISK ADDL DX/OHS AUDIT: ICD-10-PCS | Mod: S$GLB,,, | Performed by: STUDENT IN AN ORGANIZED HEALTH CARE EDUCATION/TRAINING PROGRAM

## 2022-04-25 PROCEDURE — 36415 COLL VENOUS BLD VENIPUNCTURE: CPT | Performed by: STUDENT IN AN ORGANIZED HEALTH CARE EDUCATION/TRAINING PROGRAM

## 2022-04-25 PROCEDURE — 3288F FALL RISK ASSESSMENT DOCD: CPT | Mod: CPTII,S$GLB,, | Performed by: STUDENT IN AN ORGANIZED HEALTH CARE EDUCATION/TRAINING PROGRAM

## 2022-04-25 PROCEDURE — 1126F PR PAIN SEVERITY QUANTIFIED, NO PAIN PRESENT: ICD-10-PCS | Mod: CPTII,S$GLB,, | Performed by: STUDENT IN AN ORGANIZED HEALTH CARE EDUCATION/TRAINING PROGRAM

## 2022-04-25 PROCEDURE — 3078F PR MOST RECENT DIASTOLIC BLOOD PRESSURE < 80 MM HG: ICD-10-PCS | Mod: CPTII,S$GLB,, | Performed by: STUDENT IN AN ORGANIZED HEALTH CARE EDUCATION/TRAINING PROGRAM

## 2022-04-25 PROCEDURE — 3288F PR FALLS RISK ASSESSMENT DOCUMENTED: ICD-10-PCS | Mod: CPTII,S$GLB,, | Performed by: STUDENT IN AN ORGANIZED HEALTH CARE EDUCATION/TRAINING PROGRAM

## 2022-04-25 PROCEDURE — 4010F ACE/ARB THERAPY RXD/TAKEN: CPT | Mod: CPTII,S$GLB,, | Performed by: STUDENT IN AN ORGANIZED HEALTH CARE EDUCATION/TRAINING PROGRAM

## 2022-04-25 PROCEDURE — 99214 PR OFFICE/OUTPT VISIT, EST, LEVL IV, 30-39 MIN: ICD-10-PCS | Mod: S$GLB,,, | Performed by: STUDENT IN AN ORGANIZED HEALTH CARE EDUCATION/TRAINING PROGRAM

## 2022-04-25 PROCEDURE — 1160F PR REVIEW ALL MEDS BY PRESCRIBER/CLIN PHARMACIST DOCUMENTED: ICD-10-PCS | Mod: CPTII,S$GLB,, | Performed by: STUDENT IN AN ORGANIZED HEALTH CARE EDUCATION/TRAINING PROGRAM

## 2022-04-25 PROCEDURE — 1159F PR MEDICATION LIST DOCUMENTED IN MEDICAL RECORD: ICD-10-PCS | Mod: CPTII,S$GLB,, | Performed by: STUDENT IN AN ORGANIZED HEALTH CARE EDUCATION/TRAINING PROGRAM

## 2022-04-25 PROCEDURE — 1101F PT FALLS ASSESS-DOCD LE1/YR: CPT | Mod: CPTII,S$GLB,, | Performed by: STUDENT IN AN ORGANIZED HEALTH CARE EDUCATION/TRAINING PROGRAM

## 2022-04-25 PROCEDURE — 1101F PR PT FALLS ASSESS DOC 0-1 FALLS W/OUT INJ PAST YR: ICD-10-PCS | Mod: CPTII,S$GLB,, | Performed by: STUDENT IN AN ORGANIZED HEALTH CARE EDUCATION/TRAINING PROGRAM

## 2022-04-25 RX ORDER — OXYCODONE AND ACETAMINOPHEN 5; 325 MG/1; MG/1
1 TABLET ORAL EVERY 8 HOURS PRN
Qty: 90 TABLET | Refills: 0 | Status: SHIPPED | OUTPATIENT
Start: 2022-04-25 | End: 2022-05-16 | Stop reason: SDUPTHER

## 2022-04-25 NOTE — TELEPHONE ENCOUNTER
Staff spoke with pt regarding prescription being sent incorrectly for percocet instead of usual dose 90 tablets , 30 tabs were sent in. Staff informed patient we will check with the provider and reach back out with a update.

## 2022-04-25 NOTE — TELEPHONE ENCOUNTER
Staff called pt to inform her that her prescription was approved and that she can contact her pharmacy, Pt verbalized understanding and confirmed SG

## 2022-04-25 NOTE — TELEPHONE ENCOUNTER
----- Message from Arron Wu sent at 4/25/2022  9:17 AM CDT -----   Name of Who is Calling:     What is the request in detail: patient request call back in reference to medication  oxyCODONE-acetaminophen (PERCOCET) 5-325 mg per tablet / pharmacy only fill 30 day supply / patient state she normally get 90 day supply  Please contact to further discuss and advise      Can the clinic reply by [facility]:     What Number to Call Back if not in [facility]:  [phone]

## 2022-04-25 NOTE — TELEPHONE ENCOUNTER
Patient requesting refill on oxycodone 5-325mg  Last office visit 03/21/22   shows last refill on 03/21/22  Patient does have a pain contract on file with [facility]  Patient last UDS 10/04/19 was consistent with current therapy    CODEINE  Not Detected    MORPHINE  Not Detected    6-ACETYLMORPHINE  Not Detected    OXYCODONE  Present    NOROYXCODONE  Present    OXYMORPHONE  Not Detected    NOROXYMORPHONE  Not Detected    HYDROCODONE  Not Detected    NORHYDROCODONE  Not Detected    HYDROMORPHONE  Not Detected    BUPRENORPHINE  Not Detected    NORUBPRENORPHINE  Not Detected    FENTANYL  Not Detected    NORFENTANYL  Not Detected    MEPERIDINE METABOLITE  Not Detected    TAPENTADOL  Not Detected    TAPENTADOL-O-SULF  Not Detected    METHADONE  Not Detected    PROPOXYPHENE  Not Detected    TRAMADOL  Not Detected    AMPHETAMINE  Not Detected    METHAMPHETAMINE  Not Detected    MDMA- ECSTASY  Not Detected    MDA  Not Detected    MDEA- Genoveva  Not Detected    METHYLPHENIDATE  Not Detected    PHENTERMINE  Not Detected    BENZOYLECGONINE  Not Detected    ALPRAZOLAM  Not Detected    ALPHA-OH-ALPRAZOLAM  Not Detected    CLONAZEPAM  Not Detected    7-AMINOCLONAZEPAM  Not Detected    DIAZEPAM  Not Detected    NORDIAZEPAM  Not Detected    OXAZEPAM  Not Detected    TEMAZEPAM  Not Detected    Lorazepam  Not Detected    MIDAZOLAM  Not Detected    ZOLPIDEM  Not Detected    BARBITURATES  Not Detected    Creatinine, Urine 20.0 - 400.0 mg/dL 102.0    ETHYL GLUCURONIDE  Not Detected    MARIJUANA METABOLITE  Not Detected    PCP  Not Detected    CARISOPRODOL  Not Detected    Comment: The carisoprodol immunoassay has cross-reactivity

## 2022-04-25 NOTE — TELEPHONE ENCOUNTER
Staff called and spoke with Hanna at pharmacy to void first prescription. Staff verbalized understanding and confirmed. SG

## 2022-04-25 NOTE — TELEPHONE ENCOUNTER
Patient requesting refill on oxycodone 5-325 mg  Last office visit 03/21/22   shows last refill on 03/21/22  Patient does have a pain contract on file with [facility]  Patient last UDS 10/04/19 was consistent with current therapy       CODEINE  Not Detected    MORPHINE  Not Detected    6-ACETYLMORPHINE  Not Detected    OXYCODONE  Present    NOROYXCODONE  Present    OXYMORPHONE  Not Detected    NOROXYMORPHONE  Not Detected    HYDROCODONE  Not Detected    NORHYDROCODONE  Not Detected    HYDROMORPHONE  Not Detected    BUPRENORPHINE  Not Detected    NORUBPRENORPHINE  Not Detected    FENTANYL  Not Detected    NORFENTANYL  Not Detected    MEPERIDINE METABOLITE  Not Detected    TAPENTADOL  Not Detected    TAPENTADOL-O-SULF  Not Detected    METHADONE  Not Detected    PROPOXYPHENE  Not Detected    TRAMADOL  Not Detected    AMPHETAMINE  Not Detected    METHAMPHETAMINE  Not Detected    MDMA- ECSTASY  Not Detected    MDA  Not Detected    MDEA- Genoveva  Not Detected    METHYLPHENIDATE  Not Detected    PHENTERMINE  Not Detected    BENZOYLECGONINE  Not Detected    ALPRAZOLAM  Not Detected    ALPHA-OH-ALPRAZOLAM  Not Detected    CLONAZEPAM  Not Detected    7-AMINOCLONAZEPAM  Not Detected    DIAZEPAM  Not Detected    NORDIAZEPAM  Not Detected    OXAZEPAM  Not Detected    TEMAZEPAM  Not Detected    Lorazepam  Not Detected    MIDAZOLAM  Not Detected    ZOLPIDEM  Not Detected    BARBITURATES  Not Detected    Creatinine, Urine 20.0 - 400.0 mg/dL 102.0    ETHYL GLUCURONIDE  Not Detected    MARIJUANA METABOLITE  Not Detected    PCP  Not Detected    CARISOPRODOL  Not Detected    Comment: The carisoprodol immunoassay has cross-reactivity to   carisoprodol and meprobamate.

## 2022-04-25 NOTE — TELEPHONE ENCOUNTER
Patient requesting refill on oxycodone 5-325 mg  Last office visit 03/21/22   shows last refill on 03/21/22  Patient does have a pain contract on file with [facility]  Patient last UDS 10/04/21 was consistent with current therapy    CODEINE  Not Detected    MORPHINE  Not Detected    6-ACETYLMORPHINE  Not Detected    OXYCODONE  Present    NOROYXCODONE  Present    OXYMORPHONE  Not Detected    NOROXYMORPHONE  Not Detected    HYDROCODONE  Not Detected    NORHYDROCODONE  Not Detected    HYDROMORPHONE  Not Detected    BUPRENORPHINE  Not Detected    NORUBPRENORPHINE  Not Detected    FENTANYL  Not Detected    NORFENTANYL  Not Detected    MEPERIDINE METABOLITE  Not Detected    TAPENTADOL  Not Detected    TAPENTADOL-O-SULF  Not Detected    METHADONE  Not Detected    PROPOXYPHENE  Not Detected    TRAMADOL  Not Detected    AMPHETAMINE  Not Detected    METHAMPHETAMINE  Not Detected    MDMA- ECSTASY  Not Detected    MDA  Not Detected    MDEA- Genoveva  Not Detected    METHYLPHENIDATE  Not Detected    PHENTERMINE  Not Detected    BENZOYLECGONINE  Not Detected    ALPRAZOLAM  Not Detected    ALPHA-OH-ALPRAZOLAM  Not Detected    CLONAZEPAM  Not Detected    7-AMINOCLONAZEPAM  Not Detected    DIAZEPAM  Not Detected    NORDIAZEPAM  Not Detected    OXAZEPAM  Not Detected    TEMAZEPAM  Not Detected    Lorazepam  Not Detected    MIDAZOLAM  Not Detected    ZOLPIDEM  Not Detected    BARBITURATES  Not Detected    Creatinine, Urine 20.0 - 400.0 mg/dL 102.0    ETHYL GLUCURONIDE  Not Detected    MARIJUANA METABOLITE  Not Detected    PCP  Not Detected    CARISOPRODOL  Not Detected    Comment: The carisoprodol immunoassay has cross-reactivity

## 2022-04-25 NOTE — TELEPHONE ENCOUNTER
Staff called pt to confirm the right pharmacy and to let pt know that we have forward over the right quantity for her prescription and is currently awaiting for approval from Provider. Pt verbalized understanding and confirmed SG

## 2022-04-25 NOTE — PROGRESS NOTES
Hematology- Oncology Clinic Note :      4/25/2022    RFV / chief complaint- Gastric Cancer        HPI  Pt is a 71 y.o. 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   Pt presents to the clinic today for gastric cancer    Doing ok. Nausea intermittent, controlled with meds   No bleeding reported.  Tolerating Eliquis without any issues.  Advised to quit smoking    Oncology presentation/ HPI  Pt was being evaluated for gastric bypass surgery   Feb 2021 EGD- Gastric tumor in the prepyloric region of the stomach,  March 2021 EUS- Gastric tumor in the prepyloric region of the stomach.  Both above biopsies did not show malignancy  5/25/21 EGD- Gastric tumor on the posterior wall of the gastric antrum. Complete removal was accomplished.         Reviewed past medical/surgical/social history    Past Medical History:   Diagnosis Date    YOUNG (acute kidney injury) 10/15/2021    Anemia     2018    Arthritis     BMI 60.0-69.9, adult     Cataract     Chronic diastolic congestive heart failure 1/27/2021    Depression     Dry eye syndrome     Gastric adenocarcinoma 5/25/2021    GERD (gastroesophageal reflux disease)     Glaucoma suspect     History of gastric ulcer     Hyperlipidemia     Hypertension     Hypothyroidism     Morbid obesity     Neuromuscular disorder     Sleep apnea     Thyroid disease       Past Surgical History:   Procedure Laterality Date    APPENDECTOMY      CARDIAC SURGERY      CATARACT EXTRACTION W/  INTRAOCULAR LENS IMPLANT Bilateral     MFIOL OU    CORONARY ANGIOGRAPHY Bilateral  2/24/2021    Procedure: ANGIOGRAM, CORONARY ARTERY;  Surgeon: Cresencio Ridley MD;  Location: [facility] CATH LAB;  Service: Cardiology;  Laterality: Bilateral;  Covid test needed - ok per Danay MORENOU. Pt. w/o transportation or family    ENDOSCOPIC ULTRASOUND OF UPPER GASTROINTESTINAL TRACT N/A 3/17/2021    Procedure: ULTRASOUND, UPPER GI TRACT, ENDOSCOPIC;  Surgeon: Gerald Anaya MD;  Location: [facility] ENDO (2ND FLR);  Service: Endoscopy;  Laterality: N/A;  Pt unable to get a ride for swab. Will do rapid test at 8:30 - ttr    ENDOSCOPIC ULTRASOUND OF UPPER GASTROINTESTINAL TRACT N/A 1/13/2022    Procedure: ULTRASOUND, UPPER GI TRACT, ENDOSCOPIC;  Surgeon: Kevin Carballo MD;  Location: [facility] ENDO (2ND FLR);  Service: Endoscopy;  Laterality: N/A;  blood thinner approval received, see telephone encounter 1/7/22-BB  rapid  instructions given verbally and sent to address on file in pt's chart-BB    ESOPHAGOGASTRODUODENOSCOPY N/A 2/5/2021    Procedure: EGD (ESOPHAGOGASTRODUODENOSCOPY);  Surgeon: Matthew Hernadez MD;  Location: [facility] ENDO (2ND FLR);  Service: Endoscopy;  Laterality: N/A;  BMI-58    Wt: 361#     COVID test at [facility] on 2/2-GT    ESOPHAGOGASTRODUODENOSCOPY N/A 3/17/2021    Procedure: EGD (ESOPHAGOGASTRODUODENOSCOPY);  Surgeon: Gerald Anaya MD;  Location: [facility] ENDO (2ND FLR);  Service: Endoscopy;  Laterality: N/A;    ESOPHAGOGASTRODUODENOSCOPY N/A 5/25/2021    Procedure: EGD (ESOPHAGOGASTRODUODENOSCOPY);  Surgeon: Kevin Carballo MD;  Location: [facility] ENDO (2ND FLR);  Service: Endoscopy;  Laterality: N/A;  ESD 2 hours  Full COVID vaccination on file. ttr    ESOPHAGOGASTRODUODENOSCOPY N/A 1/13/2022    Procedure: EGD (ESOPHAGOGASTRODUODENOSCOPY);  Surgeon: Kevin Carballo MD;  Location: [facility] ENDO (2ND FLR);  Service: Endoscopy;  Laterality: N/A;  blood thinner approval, see telephone encounter 1/7/22-BB  rapid  instructions given verbally and sent to address on file in pt's chart-BB    EYE SURGERY       INJECTION OF ANESTHETIC AGENT AROUND NERVE Left 7/10/2018    Procedure: BLOCK, NERVE;  Surgeon: Samantha Vidal MD;  Location: [facility] PAIN MGT;  Service: Pain Management;  Laterality: Left;  Genicular     INJECTION OF ANESTHETIC AGENT AROUND NERVE Left 2019    Procedure: Block, Nerve NERVE BLOCK GENICULAR WITH PHENOL 6%;  Surgeon: Samantha Vidal MD;  Location: [facility] PAIN MGT;  Service: Pain Management;  Laterality: Left;  NEEDS CONSENT    INSERTION OF TUNNELED CENTRAL VENOUS CATHETER (CVC) WITH SUBCUTANEOUS PORT N/A 2021    Procedure: INSERTION, PORT-A-CATH;  Surgeon: Mario Paz MD;  Location: [facility] CATH LAB;  Service: Radiology;  Laterality: N/A;  PORT PLACEMENT    RADIOFREQUENCY ABLATION Left 2020    Procedure: RADIOFREQUENCY ABLATION LEFT GENICULAR;  Surgeon: Tevin Clark MD;  Location: [facility] PAIN MGT;  Service: Pain Management;  Laterality: Left;  Left Genicular RFA    RADIOFREQUENCY ABLATION Left 2021    Procedure: RADIOFREQUENCY ABLATION LEFT GENICULAR;  Surgeon: Tevin Clark MD;  Location: [facility] PAIN MGT;  Service: Pain Management;  Laterality: Left;    RADIOFREQUENCY ABLATION Left 2021    Procedure: RADIOFREQUENCY ABLATION, GENICULAR COOLED NEED CONSENT;  Surgeon: Tevin Clark MD;  Location: [facility] PAIN MGT;  Service: Pain Management;  Laterality: Left;    RADIOFREQUENCY ABLATION Left 2022    Procedure: RADIOFREQUENCY ABLATION, GENICULAR COOLED , LEFT;  Surgeon: Tevin Clark MD;  Location: [facility] PAIN MGT;  Service: Pain Management;  Laterality: Left;    TONSILLECTOMY        (Not in a hospital admission)    Review of patient's allergies indicates:  No Known Allergies   Social History     Tobacco Use    Smoking status: Current Some Day Smoker     Packs/day: 0.25     Years: 50.00     Pack years: 12.50     Types: Cigarettes     Last attempt to quit: 2020     Years since quittin.3    Smokeless tobacco: Never Used   Substance Use Topics    Alcohol  "use: Yes     Comment: meg      Family History   Problem Relation Age of Onset    No Known Problems Mother     No Known Problems Father     Colon cancer Neg Hx     Esophageal cancer Neg Hx           Review of Systems :  Review of Systems   Constitutional: Positive for malaise/fatigue. Negative for chills, diaphoresis, fever and weight loss.   HENT: Negative.  Negative for congestion, hearing loss, nosebleeds, sore throat and tinnitus.    Eyes: Negative.  Negative for blurred vision and discharge.   Respiratory: Negative for cough, hemoptysis, sputum production, shortness of breath and wheezing.    Cardiovascular: Negative.  Negative for chest pain, palpitations and leg swelling.   Gastrointestinal: Positive for nausea. Negative for abdominal pain, blood in stool, constipation, diarrhea, heartburn, melena and vomiting.   Genitourinary: Negative.    Musculoskeletal: Positive for joint pain. Negative for back pain, falls and myalgias.   Skin: Negative for itching and rash.   Neurological: Negative for dizziness, tingling, sensory change, speech change, focal weakness, seizures, loss of consciousness, weakness and headaches.   Endo/Heme/Allergies: Negative.  Does not bruise/bleed easily.   Psychiatric/Behavioral: Negative.  Negative for depression. The patient is not nervous/anxious and does not have insomnia.                Physical Exam :  /65 (BP Location: Left arm, Patient Position: Sitting, BP Method: Medium (Automatic))   Pulse 74   Temp 98 °F (36.7 °C) (Oral)   Resp 14   Ht 5' 6" (1.676 m)   Wt 127.6 kg (281 lb 4.9 oz)   SpO2 98%   BMI 45.40 kg/m²   Wt Readings from Last 3 Encounters:   04/25/22 127.6 kg (281 lb 4.9 oz)   04/22/22 127.5 kg (281 lb)   04/21/22 127.5 kg (281 lb)       Body mass index is 45.4 kg/m².      Physical Exam  Vitals and nursing note reviewed.   Constitutional:       General: She is not in acute distress.     Appearance: She is well-developed. She is not ill-appearing. "   HENT:      Head: Normocephalic and atraumatic.      Right Ear: External ear normal.      Left Ear: External ear normal.      Mouth/Throat:      Pharynx: No oropharyngeal exudate.   Eyes:      General: No scleral icterus.     Conjunctiva/sclera: Conjunctivae normal.   Neck:      Thyroid: No thyromegaly.      Trachea: No tracheal deviation.   Cardiovascular:      Rate and Rhythm: Normal rate and regular rhythm.      Heart sounds: Normal heart sounds. No murmur heard.  Pulmonary:      Effort: Pulmonary effort is normal. No respiratory distress.      Breath sounds: Normal breath sounds. No wheezing or rales.      Comments: Port SOI  Chest:   Breasts:      Right: No swelling, nipple discharge, skin change or tenderness.       Abdominal:      General: Bowel sounds are normal. There is no distension (obese).      Palpations: Abdomen is soft. There is no mass.      Tenderness: There is no abdominal tenderness. There is no rebound.   Musculoskeletal:         General: No swelling or tenderness. Normal range of motion.      Cervical back: Normal range of motion and neck supple.   Lymphadenopathy:      Cervical: No cervical adenopathy.   Skin:     General: Skin is warm.      Findings: No erythema, rash or wound.   Neurological:      Mental Status: She is alert and oriented to person, place, and time.      Cranial Nerves: No cranial nerve deficit.      Gait: Gait abnormal (uses cane).   Psychiatric:         Behavior: Behavior normal.             Current Outpatient Medications   Medication Sig Dispense Refill    acetaminophen (TYLENOL) 500 MG tablet       amLODIPine (NORVASC) 10 MG tablet TAKE 1 TABLET ONE TIME DAILY 90 tablet 1    atorvastatin (LIPITOR) 20 MG tablet TAKE 1 TABLET ONE TIME DAILY 90 tablet 1    B-complex with vitamin C (Z-BEC OR EQUIV) tablet Take 1 tablet by mouth once daily.       CALCIUM CITRATE-VITAMIN D2 ORAL Take 1 tablet by mouth once daily.       citalopram (CELEXA) 10 MG tablet Take 1 tablet (10  mg total) by mouth once daily. 90 tablet 3    ELIQUIS 5 mg Tab TAKE 1 TABLET TWICE DAILY 180 tablet 2    ferrous gluconate (FERGON) 324 MG tablet Take 1 tablet (324 mg total) by mouth daily with breakfast. 90 tablet 11    furosemide (LASIX) 80 MG tablet Take 1 tablet (80 mg total) by mouth 2 (two) times a day. 60 tablet 11    gabapentin (NEURONTIN) 300 MG capsule Take 2 capsules (600 mg total) by mouth 3 (three) times daily. 540 capsule 2    lactulose (CHRONULAC) 10 gram/15 mL solution Take 30 mLs (20 g total) by mouth 2 (two) times daily as needed (constipation). 120 mL 1    LIDOcaine-prilocaine (EMLA) cream Apply topically as needed (prior to port access). 30 g 0    lisinopriL (PRINIVIL,ZESTRIL) 40 MG tablet TAKE 1 TABLET ONE TIME DAILY 90 tablet 1    multivitamin with minerals tablet Take 1 tablet by mouth once daily.       pantoprazole (PROTONIX) 40 MG tablet Take 1 tablet (40 mg total) by mouth Daily. 90 tablet 1    semaglutide (OZEMPIC) 1 mg/dose (2 mg/1.5 mL) PnIj Inject 1 mg into the skin every 7 days. 2 pen 3    oxyCODONE-acetaminophen (PERCOCET) 5-325 mg per tablet Take 1 tablet by mouth every 8 (eight) hours as needed for Pain. 90 tablet 0     No current facility-administered medications for this visit.     Facility-Administered Medications Ordered in Other Visits   Medication Dose Route Frequency Provider Last Rate Last Admin    0.9%  NaCl infusion   Intravenous Continuous Tevin Clark MD   Stopped at 01/13/22 1055       Pertinent Diagnostic studies:      Lab Visit on 04/25/2022   Component Date Value Ref Range Status    WBC 04/25/2022 7.24  3.90 - 12.70 K/uL Final    RBC 04/25/2022 3.63 (A) 4.00 - 5.40 M/uL Final    Hemoglobin 04/25/2022 12.4  12.0 - 16.0 g/dL Final    Hematocrit 04/25/2022 38.0  37.0 - 48.5 % Final    MCV 04/25/2022 105 (A) 82 - 98 fL Final    MCH 04/25/2022 34.2 (A) 27.0 - 31.0 pg Final    MCHC 04/25/2022 32.6  32.0 - 36.0 g/dL Final    RDW 04/25/2022 14.4   11.5 - 14.5 % Final    Platelets 04/25/2022 199  150 - 450 K/uL Final    MPV 04/25/2022 10.0  9.2 - 12.9 fL Final    Gran # (ANC) 04/25/2022 5.0  1.8 - 7.7 K/uL Final    Comment: The ANC is based on a white cell differential from an   automated cell counter. It has not been microscopically   reviewed for the presence of abnormal cells. Clinical   correlation is required.      Immature Grans (Abs) 04/25/2022 0.04  0.00 - 0.04 K/uL Final    Comment: Mild elevation in immature granulocytes is non specific and   can be seen in a variety of conditions including stress response,   acute inflammation, trauma and pregnancy. Correlation with other   laboratory and clinical findings is essential.      Sodium 04/25/2022 142  136 - 145 mmol/L Final    Potassium 04/25/2022 4.1  3.5 - 5.1 mmol/L Final    Chloride 04/25/2022 108  95 - 110 mmol/L Final    CO2 04/25/2022 23  23 - 29 mmol/L Final    Glucose 04/25/2022 97  70 - 110 mg/dL Final    BUN 04/25/2022 13  8 - 23 mg/dL Final    Creatinine 04/25/2022 0.7  0.5 - 1.4 mg/dL Final    Calcium 04/25/2022 9.4  8.7 - 10.5 mg/dL Final    Total Protein 04/25/2022 6.2  6.0 - 8.4 g/dL Final    Albumin 04/25/2022 3.2 (A) 3.5 - 5.2 g/dL Final    Total Bilirubin 04/25/2022 0.7  0.1 - 1.0 mg/dL Final    Comment: For infants and newborns, interpretation of results should be based  on gestational age, weight and in agreement with clinical  observations.    Premature Infant recommended reference ranges:  Up to 24 hours.............<8.0 mg/dL  Up to 48 hours............<12.0 mg/dL  3-5 days..................<15.0 mg/dL  6-29 days.................<15.0 mg/dL      Alkaline Phosphatase 04/25/2022 103  55 - 135 U/L Final    AST 04/25/2022 25  10 - 40 U/L Final    ALT 04/25/2022 20  10 - 44 U/L Final    Anion Gap 04/25/2022 11  8 - 16 mmol/L Final    eGFR if African American 04/25/2022 >60  >60 mL/min/1.73 m^2 Final    eGFR if non African American 04/25/2022 >60  >60 mL/min/1.73  m^2 Final    Comment: Calculation used to obtain the estimated glomerular filtration  rate (eGFR) is the CKD-EPI equation.            Patient Active Problem List    Diagnosis Date Noted    Gastric adenocarcinoma 05/25/2021    Chronic diastolic congestive heart failure 01/27/2021    Class 3 severe obesity due to excess calories with serious comorbidity and body mass index (BMI) of 50.0 to 59.9 in adult 04/29/2019    Essential hypertension 04/29/2019    Left knee DJD 12/21/2018    Current mild episode of major depressive disorder without prior episode 03/07/2022    History of DVT (deep vein thrombosis) 10/15/2021    Chronic pain 07/30/2021    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    Abnormal cardiovascular stress test 02/24/2021    Tobacco abuse 01/28/2021    Pure hypercholesterolemia 01/27/2021    Gastroesophageal reflux disease 03/22/2020    Osteopenia of neck of left femur 01/14/2020    Left knee pain 07/19/2019    Hypothyroidism 07/18/2019    Debility     At high risk for injury related to fall     Gait instability 07/15/2019    Major depressive disorder 04/29/2019    Joint pain 04/29/2019    Chronic knee pain 11/30/2018    Obstructive sleep apnea 08/09/2018    Primary osteoarthritis of left knee 07/10/2018     Active Problem List with Overview Notes    Diagnosis Date Noted    Gastric adenocarcinoma 05/25/2021     gastric cancer cT2 or higher      Chronic diastolic congestive heart failure 01/27/2021    Class 3 severe obesity due to excess calories with serious comorbidity and body mass index (BMI) of 50.0 to 59.9 in adult 04/29/2019    Essential hypertension 04/29/2019    Left knee DJD 12/21/2018    Current mild episode of major depressive disorder without prior episode 03/07/2022    History of DVT (deep vein thrombosis) 10/15/2021    Chronic pain 07/30/2021    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    Abnormal  cardiovascular stress test 02/24/2021    Tobacco abuse 01/28/2021    Pure hypercholesterolemia 01/27/2021    Gastroesophageal reflux disease 03/22/2020    Osteopenia of neck of left femur 01/14/2020    Left knee pain 07/19/2019    Hypothyroidism 07/18/2019    Debility     At high risk for injury related to fall     Gait instability 07/15/2019    Major depressive disorder 04/29/2019    Joint pain 04/29/2019    Chronic knee pain 11/30/2018    Obstructive sleep apnea 08/09/2018    Primary osteoarthritis of left knee 07/10/2018         Oncology History   Gastric adenocarcinoma   2/5/2021 Procedure    EGD  Feb 2021 EGD- Gastric tumor in the prepyloric region of the stomach,     3/17/2021 Procedure    EUS  Impression:           - Gastric tumor in the prepyloric region of the                         stomach. Biopsied. Lesion appears amenable to                         endoscopic resection (ESD) - Dr. Carballo was present                         to view the lesion.      5/25/2021 Initial Diagnosis    Gastric adenocarcinoma     5/25/2021 Pathology Significant Finding    Adenocarcinoma, moderate to poorly differentiated   Tumor invades deep layers of submuocsa with deep margin extensively positive   Deep margin is extensively positive   Lateral margins are negative for neoplasia or malignancy      5/25/2021 Cancer Staged    Staging form: Stomach, AJCC 8th Edition  - Pathologic stage from 5/25/2021: Stage Unknown (pT1, pNX, cM0)     6/18/2021 Imaging Significant Findings    CT CAP   Asymmetric gastric wall thickening at the level of the gastric antrum presumably representing the patient's gastric adenocarcinoma.  I see no CT evidence for metastatic disease.     Low-density focus lower pole left kidney does not meet criteria for a simple cyst.  Findings can be further evaluated with ultrasound.  Additional subcentimeter low-density foci in the right kidney likely too small to characterize by imaging.     6/28/2021  Tumor Conference       [facility] UGI MULTIDISCIPLINARY TUMOR BOARD  PATIENT REVIEW FORM   ____________________________________________________________    CLINIC #: [id number]  DATE: 6/28/2021    DIAGNOSIS: gastric CA    PRESENTER: Latanya/ Donnie Sanders    PATIENT SUMMARY:   This 71 y/o female had incidental finding of gastric CA on EGD as part of bariatric evaluation given BMI 57. Underwent ESD per Dr. Carballo. Reviewed pathology - pT1b with LVI positive, extensive tumor at deep margin.     BOARD RECOMMENDATIONS:   Recommend perioperative chemotherapy, 4 cycles of FLOT    CONSULT NEEDED:     [] Surgery    [x] Hem/Onc    [] Rad/Onc    [] Dietary                 [] Social Service    [] Psychology       [] AES  [] Radiology     ESD Pathology Stage: Tumor 1b  Node(s) 0 Metastasis 0      GROUP STAGE:  [] O    [] 1A    [] IB    [] IIA    [] IIB     [] IIIA     [] IIIB     [] IIIC    []IV  [] Local recurrence     [] Regional recurrence     [] Distant recurrence   Metastatic site(s): none         [x] Jennifer'l Treatment Guidelines reviewed and care planned is consistent with guidelines.         (i.e., NCCN, NCI, PD, ACO, AUA, etc.)    PRESENTATION AT CANCER CONFERENCE:         [x] Prospective    [] Retrospective     [] Follow-Up       7/21/2021 - 9/30/2021 Chemotherapy    Treatment Summary   Plan Name: OP GASTRIC FLOT - FLUOROURACIL LEUCOVORIN OXALIPLATIN DOCETAXEL Q2W  Treatment Goal: Curative  Status: Inactive  Start Date: 7/21/2021  End Date: 9/30/2021  Provider: Cathy Pelaez MD  Chemotherapy: DOCEtaxeL (TAXOTERE) 50 mg/m2 = 135 mg in sodium chloride 0.9% 250 mL chemo infusion, 50 mg/m2 = 135 mg, Intravenous, Clinic/H[facility] 1 time, 4 of 4 cycles  Dose modification: 40 mg/m2 (original dose 50 mg/m2, Cycle 3)  Administration: 135 mg (7/21/2021), 135 mg (8/12/2021), 105 mg (9/15/2021), 105 mg (9/29/2021)  leucovorin calcium 200 mg/m2 = 545 mg in dextrose 5 % 250 mL infusion, 200 mg/m2 = 545 mg, Intravenous, Clinic/HOD 1  time, 4 of 4 cycles  Administration: 545 mg (7/21/2021), 535 mg (8/12/2021), 530 mg (9/15/2021), 525 mg (9/29/2021)  oxaliplatin (ELOXATIN) 85 mg/m2 = 232 mg in dextrose 5 % 500 mL chemo infusion, 85 mg/m2 = 232 mg, Intravenous, Clinic[facility] 1 time, 4 of 4 cycles  Administration: 232 mg (7/21/2021), 228 mg (8/12/2021), 225 mg (9/15/2021), 223 mg (9/29/2021)  fluorouraciL 7,000 mg in sodium chloride 0.9% 240 mL chemo infusion, 7,100 mg, Intravenous, Over 24 hours, 4 of 11 cycles  Dose modification: 2,000 mg/m2 (original dose 2,600 mg/m2, Cycle 3), 225 mg/m2/day (original dose 2,600 mg/m2, Cycle 5)  Administration: 7,000 mg (7/21/2021), 6,970 mg (8/12/2021), 5,300 mg (9/15/2021), 5,000 mg (9/29/2021)     9/16/2021 Imaging Significant Findings    Thrombosis of the right popliteal, posterior tibial, and peroneal veins.     11/15/2021 Imaging Significant Findings    CT CAP Overall, no detrimental change compared to previous.  Persistent eccentric wall thickening at the level of the antrum in this patient with provided history of gastric adenocarcinoma.  No metastatic disease.     Subcentimeter pulmonary nodules unchanged.  No new nodules.     Cholelithiasis     1/13/2022 Procedure    EUS  Impression:            - Normal esophagus.                          - Scar in the gastric antrum. Biopsied.                          - Congested, nodular and ulcerated mucosa in the                          antrum. Biopsied. Treated with argon plasma                          coagulation (APC).                          - Acquired deformity in the prepyloric region of                          the stomach.                          - Normal examined duodenum.                          - There was abnormal echogenicity in the                          visualized portion of the liver. This was                          hyperechoic and homogenous. This can be seen with                          fatty liver.                          - Hyperechoic  material consistent with sludge was                          visualized endosonographically in the gallbladder.                          - There was dilation in the common bile duct which                          measured up to 10.8 mm.                          - Wall thickening was seen in the antrum of the                          stomach. This probable related to previous ESD.      2/14/2022 -  Chemotherapy    Treatment Summary   Plan Name: OP FLUOROURACIL (5 DAY) QW + XRT  Treatment Goal: Curative  Status: Active  Start Date: 2/14/2022  End Date: 3/30/2022  Provider: Cathy Pelaez MD  Chemotherapy: [No matching medication found in this treatment plan]     2/14/2022 - 3/29/2022 Radiation Therapy    Treating physician: Dr. Sonu Darden  Total Dose: 50.4 Gy  Fractions: 28    Course: C1 Abdomen 2022    Treatment Site Ref. ID Energy Dose/Fx (Gy) #Fx Dose Correction (Gy) Total Dose (Gy) Start Date End Date Elapsed Days   IM Stomach PTV_Low 6X 1.8 25 / 25 0 45 2/14/2022 3/24/2022 38   IM StomachBst PTVhigh 6X 1.8 3 / 3 0 5.4 3/25/2022 3/29/2022 4     F Isabel Coats is a 71 y.o. woman with at least [id number] adenocarcinoma of gastric antrum (posterior wall), intestinal type, moderate to poorly differentiated, invading deep layers of submuocsa with deep SM extensively positive, lateral margins negative, LVI focal positive, PNI present, no nodes examined, s/p 4 cycles of FLOT per [facility], but after re-assessment, no longer surgical candidate due to comorbidities.  PMH of  LAURA, HTN, HLD, Obesity, smoker, Mili CHF    She is s/p concurrent 5FU-CI and RT 45Gy/25fx to the entire stomach+elective nodes with 5.4Gy/3fx boost to the prepylotic region completed 3/29/2022.                Assessment :       1. Gastric adenocarcinoma    2. History of DVT (deep vein thrombosis)    3. Chronic diastolic congestive heart failure    4. Essential hypertension    5. Morbid obesity        Plan :           Gastric adenocarcinoma pT1b  atleast , clinically T2   Found on work up for gastric bypass surgery  EGD and EUS showed gastric tumor, biopsy neg for malignancy  Endoscopic resection- 5/25/21 Path Adenocarcinoma, moderate to poorly differentiated, Tumor invades deep layers of submuocsa with deep margin extensively positive. Tumor size 3.0 x 2.2 cm   CT CAP - no LN involvement or metastatic disease  Pt was discussed in UGI tumor board 6/28/2021 , plan for perioperative chemo followed by scans and surgery (Dr. Donnie Sanders )  Plan for FLOT four preoperative and four postoperative 2-week cycles. If pt is not able to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

## 2022-04-27 NOTE — PLAN OF CARE
Addended by: BARRY GRIFFITH on: 4/27/2022 10:11 AM     Modules accepted: Orders     Home infusion complete. Pt tolerated well. VSS. NAD. Infusion pump DC'd. Port to chest de-accessed after heparinized per protocol.  Pt verbalized understanding of discharge instructions.

## 2022-04-29 ENCOUNTER — TELEPHONE (OUTPATIENT)
Dept: INFUSION THERAPY | Facility: HOSPITAL | Age: 72
End: 2022-04-29
Payer: MEDICARE

## 2022-05-13 ENCOUNTER — TELEPHONE (OUTPATIENT)
Dept: PAIN MEDICINE | Facility: CLINIC | Age: 72
End: 2022-05-13
Payer: MEDICARE

## 2022-05-13 NOTE — TELEPHONE ENCOUNTER
This message is for patient in regards to his/her appointment 05/16/22 at 11:00a       [facility] Policy: For the safety of all patients and staff members.     Patient Visitor policy: During this visit we're asking all patients to only have one visitor over the age of 18yrs old to accompany to be seen by Dr. Savanna Hyatt, DARIELP-C. If patient do not required assistance with their visit, we're asking all visitors to remain outside the waiting area.    Upon arriving to your schedule appointment; patients are required to wear a face mask, if patient do not have a face mask one will be provided entering the facility. If you have any questions or concerns please contact [phone]    Staff left patient voicemail reminding patient of their appointment

## 2022-05-16 ENCOUNTER — OFFICE VISIT (OUTPATIENT)
Dept: PAIN MEDICINE | Facility: CLINIC | Age: 72
End: 2022-05-16
Payer: MEDICARE

## 2022-05-16 ENCOUNTER — TELEPHONE (OUTPATIENT)
Dept: INFUSION THERAPY | Facility: HOSPITAL | Age: 72
End: 2022-05-16
Payer: MEDICARE

## 2022-05-16 VITALS
WEIGHT: 272.69 LBS | HEIGHT: 66 IN | SYSTOLIC BLOOD PRESSURE: 100 MMHG | HEART RATE: 78 BPM | BODY MASS INDEX: 43.82 KG/M2 | DIASTOLIC BLOOD PRESSURE: 64 MMHG | TEMPERATURE: 97 F

## 2022-05-16 DIAGNOSIS — M17.12 PRIMARY OSTEOARTHRITIS OF LEFT KNEE: ICD-10-CM

## 2022-05-16 DIAGNOSIS — M25.562 CHRONIC PAIN OF LEFT KNEE: Primary | ICD-10-CM

## 2022-05-16 DIAGNOSIS — M17.12 PRIMARY OSTEOARTHRITIS OF LEFT KNEE: Primary | ICD-10-CM

## 2022-05-16 DIAGNOSIS — G89.4 CHRONIC PAIN DISORDER: ICD-10-CM

## 2022-05-16 DIAGNOSIS — G89.29 CHRONIC PAIN OF LEFT KNEE: Primary | ICD-10-CM

## 2022-05-16 DIAGNOSIS — C16.9 GASTRIC ADENOCARCINOMA: ICD-10-CM

## 2022-05-16 DIAGNOSIS — M25.562 CHRONIC PAIN OF LEFT KNEE: ICD-10-CM

## 2022-05-16 DIAGNOSIS — G89.29 CHRONIC PAIN OF LEFT KNEE: ICD-10-CM

## 2022-05-16 PROCEDURE — 99999 PR PBB SHADOW E&M-EST. PATIENT-LVL IV: CPT | Mod: PBBFAC,,, | Performed by: NURSE PRACTITIONER

## 2022-05-16 PROCEDURE — 3074F SYST BP LT 130 MM HG: CPT | Mod: CPTII,S$GLB,, | Performed by: NURSE PRACTITIONER

## 2022-05-16 PROCEDURE — 99214 OFFICE O/P EST MOD 30 MIN: CPT | Mod: S$GLB,,, | Performed by: NURSE PRACTITIONER

## 2022-05-16 PROCEDURE — 3074F PR MOST RECENT SYSTOLIC BLOOD PRESSURE < 130 MM HG: ICD-10-PCS | Mod: CPTII,S$GLB,, | Performed by: NURSE PRACTITIONER

## 2022-05-16 PROCEDURE — 3008F PR BODY MASS INDEX (BMI) DOCUMENTED: ICD-10-PCS | Mod: CPTII,S$GLB,, | Performed by: NURSE PRACTITIONER

## 2022-05-16 PROCEDURE — 1125F AMNT PAIN NOTED PAIN PRSNT: CPT | Mod: CPTII,S$GLB,, | Performed by: NURSE PRACTITIONER

## 2022-05-16 PROCEDURE — 1125F PR PAIN SEVERITY QUANTIFIED, PAIN PRESENT: ICD-10-PCS | Mod: CPTII,S$GLB,, | Performed by: NURSE PRACTITIONER

## 2022-05-16 PROCEDURE — 3078F DIAST BP <80 MM HG: CPT | Mod: CPTII,S$GLB,, | Performed by: NURSE PRACTITIONER

## 2022-05-16 PROCEDURE — 1159F PR MEDICATION LIST DOCUMENTED IN MEDICAL RECORD: ICD-10-PCS | Mod: CPTII,S$GLB,, | Performed by: NURSE PRACTITIONER

## 2022-05-16 PROCEDURE — 3288F PR FALLS RISK ASSESSMENT DOCUMENTED: ICD-10-PCS | Mod: CPTII,S$GLB,, | Performed by: NURSE PRACTITIONER

## 2022-05-16 PROCEDURE — 1101F PT FALLS ASSESS-DOCD LE1/YR: CPT | Mod: CPTII,S$GLB,, | Performed by: NURSE PRACTITIONER

## 2022-05-16 PROCEDURE — 99214 PR OFFICE/OUTPT VISIT, EST, LEVL IV, 30-39 MIN: ICD-10-PCS | Mod: S$GLB,,, | Performed by: NURSE PRACTITIONER

## 2022-05-16 PROCEDURE — 1160F PR REVIEW ALL MEDS BY PRESCRIBER/CLIN PHARMACIST DOCUMENTED: ICD-10-PCS | Mod: CPTII,S$GLB,, | Performed by: NURSE PRACTITIONER

## 2022-05-16 PROCEDURE — 3288F FALL RISK ASSESSMENT DOCD: CPT | Mod: CPTII,S$GLB,, | Performed by: NURSE PRACTITIONER

## 2022-05-16 PROCEDURE — 1159F MED LIST DOCD IN RCRD: CPT | Mod: CPTII,S$GLB,, | Performed by: NURSE PRACTITIONER

## 2022-05-16 PROCEDURE — 1160F RVW MEDS BY RX/DR IN RCRD: CPT | Mod: CPTII,S$GLB,, | Performed by: NURSE PRACTITIONER

## 2022-05-16 PROCEDURE — 1101F PR PT FALLS ASSESS DOC 0-1 FALLS W/OUT INJ PAST YR: ICD-10-PCS | Mod: CPTII,S$GLB,, | Performed by: NURSE PRACTITIONER

## 2022-05-16 PROCEDURE — 4010F ACE/ARB THERAPY RXD/TAKEN: CPT | Mod: CPTII,S$GLB,, | Performed by: NURSE PRACTITIONER

## 2022-05-16 PROCEDURE — 99999 PR PBB SHADOW E&M-EST. PATIENT-LVL IV: ICD-10-PCS | Mod: PBBFAC,,, | Performed by: NURSE PRACTITIONER

## 2022-05-16 PROCEDURE — 4010F PR ACE/ARB THEARPY RXD/TAKEN: ICD-10-PCS | Mod: CPTII,S$GLB,, | Performed by: NURSE PRACTITIONER

## 2022-05-16 PROCEDURE — 3078F PR MOST RECENT DIASTOLIC BLOOD PRESSURE < 80 MM HG: ICD-10-PCS | Mod: CPTII,S$GLB,, | Performed by: NURSE PRACTITIONER

## 2022-05-16 PROCEDURE — 3008F BODY MASS INDEX DOCD: CPT | Mod: CPTII,S$GLB,, | Performed by: NURSE PRACTITIONER

## 2022-05-16 RX ORDER — OXYCODONE AND ACETAMINOPHEN 5; 325 MG/1; MG/1
1 TABLET ORAL EVERY 8 HOURS PRN
Qty: 90 TABLET | Refills: 0 | Status: SHIPPED | OUTPATIENT
Start: 2022-05-25 | End: 2022-05-31 | Stop reason: SDUPTHER

## 2022-05-16 NOTE — PROGRESS NOTES
"Chronic patient Established Note (Follow up visit)    HPI:  Ca Coats is a 69 y.o. female who presents today with left knee pain. Her pain has been going on for "too long."  She was previously treated by Dr. Iyer at [facility].  She has injections [id number] with him.  These were helpful, but she does not know if the injections were steroid or viscosupplementation.   This pain is described in detail below.     Interval History (4/30/2018):  The patient returns to clinic today for follow up and records review. We did received records from [facility] including a MRI of her knee. Dr. Iyer's last office visit note from January 2017 does not include any previous injections. She continues to report bilateral knee pain, left greater than right. She describes this pain as constant and aching. This pain is worse with prolonged walking. She also report right shoulder pain with prolonged overhead activity. She continues to take Percocet with benefit. She denies any other health changes.     Interval History (5/31/2018):  The patient returns to clinic for follow up. She is s/p left knee Synvisc One injection on 5/8/2018. She reports 35% relief of her knee pain. She continues to report bilateral knee pain, left greater than right. She describes this pain as constant and aching. Her pain is worse with prolonged walking and standing. She is scheduled to see Orthopedics at the  End of this month. She is also following up with Bariatrics to discuss the lap band procedure. She continues to take Percocet with benefit. She also uses Voltaren gel with benefit but this is expensive for her. She denies any other health changes.          Interval History (7/2/2018):  The patient returns to clinic today for follow up. She continues to report left knee pain that is constant, sharp, and aching in nature. Her pain is worse with prolonged walking and standing. She is scheduled for weight loss surgery in August. She continues " "follow up with orthopedics who does not recommend surgery at this time due to her BMI. She continues to take Percocet as needed with benefit. She denies any other health changes.      Interval History (7/19/2018):  The patient returns to clinic today for follow up. She is s/p left genicular nerve block on 7/10/2018. She reports 80% relief of her left knee pain. She reports that she was able to walk for longer distances (3 blocks) after the block. Her pain has returned to baseline. She continues to report left knee pain that is constant, sharp, and aching in nature. Her pain is worse with prolonged walking and standing. She denies any other health changes.      Interval History (8/21/2018):  The patient returns to clinic today for follow up. She is s/p left genicular cooled RFA on 7/31/2018. She reports 60% relief of her left knee pain. She reports intermittent knee pain that is sore and aching in nature. She continues to perform a home exercise routine. She is actively trying to lose weight. She continues to follow up with Bariatric Surgery at [facility]. She is considering weight loss surgery. She continues to use compound cream with benefit. She denies any other health changes.      Interval History (11/21/2018):  The patient returns to clinic today for follow up. She reports increased left knee pain this past week. She reports 2 episodes where her knee has "locked" up on her while walking. She describes her knee pain as sore and aching. She is actively trying to lose weight and quit smoking. She continues to use the compound cream with benefit. She denies any other health changes.      Interval History (1/9/2019):  The patient returns to clinic today for follow up. She is s/p left knee steroid injection on 12/21/2018. She reports one day of relief. She continues to report left knee pain that is constant, sharp, and aching in nature. Her pain is worse with standing and walking. She reports that her knee "locks" up on " her while walking. She often feels as though she is going to fall. She is scheduled for bariatric weight loss surgery in February at [facility]. She denies any other health changes.      Interval History (2/28/2019):  The patient returns to clinic today for follow up. She is s/p Euflexxa series completed on 1/30/2019. She reports that she able to stand for longer periods of time since the procedure. She continues to report left knee pain that is sore and aching in nature. Her pain is worse with prolonged standing and walking. She was previously scheduled for weight loss surgery but reports this was canceled. She is scheduled for follow up next week about this. She denies any other health changes.      Interval History (4/12/2019):  The patient returns for f/u of left knee pain.  She is s/p left genicular cooled RFA with about 90% pain relief.  She has been able to walk easier.  She also notices less stiffness in her knee.  She is planning on gastric surgery prior to knee replacement.  She had benefit with compounding cream but it is no longer covered by her insurance.  She does take Percocet from her PCP.  Her pain today is 7/10.     Interval History (7/11/19):  Patient reported to ED today for increased L knee pain and an episode of LLE weakness that lead to a near fall. Patient is s/p L genicular RFA in 3/19/19 that provided 90% relief for 2 months then came back. Pain is a crunchy pain, in left knee, that does not radiate, worse with movement, better with rest. Pain today is 9/10. She normally uses cane to ambulate but is currently using wheelchair at hospital. Denies any trauma ot the area, fever/chills, n/v, abdominal pain, or HA.     Interval History (8/8/2019):  She returns today for follow up s/p left knee genicular chemodenervation with phenol 7/19/19.  She reports that this procedure did not provide any improvement in her left knee symptoms, and she is still having significant difficulty with ambulation, still  uses wheelchair for mobility. She also reports left lateral thigh and hip pain lateral upper thigh numbness. She has been admitted to [facility] but is not receiving PT there. She is still considering lap band surgery but has missed a followup appointment.      Interval History (9/4/2019):  The patient returns to clinic today for follow up. She continues to report intermittent left lateral thigh and hip pain that is tingling and numb in nature. She continues to report left knee pain. She continues to have difficulty ambulating due to pain. She is currently living in a SNF. She continues to take Gabapentin 300 mg BID. This was increased at last visit but not increased at the nursing home. She does report benefit with Percocet though it only last approximately 4-5 hours. She denies any adverse effects. She does present with a care attendant from the SNF today. Her pain today is 10/10.     Interval History (10/4/2019):  The patient returns to clinic today for follow up and medication refill. She continues to report bilateral knee pain that is aching in nature. She reports intermittent left lateral thigh pain that is tingling and shooting in nature. She reports that her pain has been improving since last visit. She has increased her activity. She continues to report benefit with Gabapentin. She continues to take Percocet as needed with benefit. She denies any other health changes. Her pain today is 0/10.     Interval History (1/6/2020):  The patient returns to clinic today for follow up. She reports increased left leg pain today that is tingling in nature. She continues to report bilateral knee pain, left greater than right. She describes this pain as constant, sharp, and aching. She is no longer living at the nursing facility. She has increased her home exercise routine. She is actively trying to quit smoking in order to move forward with bariatric surgery. She continues to report benefit with current  medication regimen. She takes Gabapentin and Percocet with benefit and without adverse effect. She denies any other health changes. Her pain today is 7/10.     Interval History (4/8/2020):  She returns today for follow up via audio.  She reports that the methocarbamol is not helping.  She continues to have a left-sided low back pain that is bothering her.  Her left knee continues to hurt.  Her right knee is hurting a little as well.  Percocet continues to help, but it is not lasting long enough.     Interval History (5/1/2020):  The patient returns for audio visit today for follow up. She continues to report bilateral knee pain, left greater than right. Her pain is worse with prolonged standing and walking. She reports that her back pain has improved. She continues to report benefit with current medication regimen. She continues to take Gabapentin and Percocet with benefit and without adverse effects. She denies any other health changes. Her pain today is 8/10.     Interval History (6/2/2020):  The patient returns to clinic today via audio visit for follow up of knee pain. She reports increased left knee pain in the last week. Her pain is worse with standing and walking. She feels as though she will fall when standing. She is currently using ice, heat, and OTC lidocaine patches with limited relief. She continues to take Gabapentin and Percocet with some benefit. She denies any adverse effects. She denies any other health changes. Her pain today is 10/10.     Interval History 7/8/2020:  DARCY Coats presents to the clinic for a follow-up appointment for follow up after 6/19/20 RFA Since the last visit, DARCY Coats states the pain has been persistant. Current pain intensity is 10/10.    Interval History 8/11/2020:  The patient returns to clinic today for follow up of knee pain. She is s/p left knee Orthovisc injection on 7/28/2020. She reports limited relief at this time. She continues to report left knee  pain, especially with standing and walking. She feels as though her knee will buckle. She has recently quit smoking. She is actively trying to lose weight. She was recently started on Ozempic per her PCP. She is following her diet plan. She continues to take Percocet with benefit but reports that it does not last. She denies any adverse effects with this medication. She denies any other health changes. Her pain today is 8/10.    Interval History 9/3/2020:  The patient returns to clinic today for follow up of knee pain. She reports some relief with left knee Orthovisc injection in July. She continues to report left knee pain that is sharp in nature. This pain is worse with standing and walking. She feels as though her knee will give out with prolonged standing. She is actively trying to lose weight but is frustrated with lack of progress. She is following her diet plan. She continues to Percocet with benefit but it does not last. She denies any adverse effects. She denies any other health changes. Her pain today is 7/10.    Interval History 12/1/2020:  The patient returns to clinic today for follow up of knee pain. She reports increased left knee pain over the last few weeks. She also reports that her left knee feels weaker over the last month. She feels as thought it will give out with standing and walking. She would like to repeat RFA as she feels this helps her feel more stable and decreases her pain. She continues to take Percocet with some benefit but asks if this can be stronger. She denies any adverse effects with this medication. She continues to follow up with Bariatrics. She continues to follow a diet plan and take Ozempic. She denies any other health changes. Her pain today is 10/10.    Interval History 3/24/2021:  The patient returns to clinic today for follow up of knee pain. She is s/p left genicular RFA on 1/22/2021. She reports some benefit of her left knee pain. She is able to stand and walking for  longer periods of time. She continues to report left knee pain. Since last visit, she had angiogram that was normal. She has discontinued anticoagulation. She also had a recent EGD which she is awaiting the results. She continues to see Bariatrics. She is actively trying to lose weight. She continues to take Gabapentin with benefit. She continues to take Percocet with benefit and without adverse effects. She denies any other health changes. Her pain today is 0/10.    Interval History 6/1/2021:  The patient returns to clinic today for follow up of knee pain. She reports increased left knee pain over the last two weeks. She has increased pain with standing and walking. She states that she can feel the bones rubbing together. She continues to follow up with Bariatrics. She is actively trying to lose weight. She continues to follow up with GI. She is hoping to pursue gastric bypass. She continues to take Gabapentin with benefit. She continues to take Percocet as needed with benefit and without adverse effects. She denies any other health changes. Her pain today is 6/10.    Interval History 7/27/2021:  DARCY Coats presents to the clinic for a follow-up appointment. Since the last visit, DARCY Coats states the pain has been worsening. Current pain intensity is 7/10. Patient had an RFA in the left knee in January 2021. She had really good relief until about June. She was given an hyaulorinic injection at that time. She had good relief for a few weeks but now she is back to the same amount of pain as before that injection. She is having pain with standing and walking. She is continuing to take percocet and it completely gets rid of her pain      Of note, patient was found to have gastric cancer. She is currently on chemotherapy. The plan is to continue chemo until the cancer can be surgically removed.     Interval History 9/14/2021:  The patient returns to clinic today for follow up of knee pain via audio visit.  She is s/p left genicular RFA on 7/30/2021. She reports 50% relief of her knee pain. She continues to report left knee pain with prolonged standing and walking. Since last visit, she has developed a breast abscess. This was drained yesterday. She is on antibiotics. She continues to undergo chemotherapy for gastric cancer. She continues to take Gabapentin with benefit. She continues to take Percocet with benefit and without adverse effects. She denies any other health changes. Her pain today is 2/10.     Interval History 11/9/2021:  The patient returns to clinic today for follow up of knee pain. She reports increased left knee pain, worse with standing and walking. Since last visit, she was admitted to the hospital for diarrhea and dehydration. She continues to undergo chemotherapy treatment for gastric cancer. She continues to take Gabapentin with benefit. She continues to take Percocet as needed with benefit and without adverse effects. She denies any other health changes. Her pain today is 7/10.       Interval History 12/28/2021:  The patient returns to clinic today for follow up of knee pain. She is s/p left knee Monovisc injection on 12/14/2021. She reports 70% relief of her left knee pain. She continues to report intermittent left knee pain, worse with standing and walking. She reports bilateral feet swelling, left greater than right. She has not seen her PCP. She has completed chemotherapy. She is scheduled for EGD in January to monitor tumor size with possible resection. She will likely undergo chemotherapy again after the scope. She continues to take Gabapentin with benefit. She continues to take Percocet as needed with benefit and without adverse effects. She denies any other health changes. Her pain today is 0/10.    Interval History 3/21/2022:  The patient returns to clinic today for follow up of knee pain. She report increased left knee pain, worse with prolonged standing and walking. She did have repeat  EGD with biopsies done. She is currently undergoing chemotherapy and radiation. She continues to take Percocet as needed with benefit and without adverse effects. She denies any other health changes. Her pain today is 0/10.    Interval History 5/16/2022:  The patient returns to clinic today for follow up of knee pain. She is s/p left genicular RFA on 4/22/2022. She reports 80% relief of her knee pain. She continues to report intermittent left knee pain. Her pain is worse with prolonged activity. She has completed radiation and chemotherapy. She is awaiting further testing to determine plan. She continues to take Percocet as needed with benefit and without adverse effects. She denies any other health changes. Her pain today is 2/10.      Pain Disability Index Review:  Last 3 PDI Scores 3/21/2022 12/28/2021 12/14/2021   Pain Disability Index (PDI) 17 16 30         Physical Therapy: Yes, she did this in 2018 for one month (twice a week). She does HEP (stretching and ROM in the morning)      Pain Medications:    Current:  · Gabapentin, tylenol 500mg Q6 prn, Percocet 5/325 mg TID PRN    Opioid Contract: yes     report:  Reviewed and consistent with medication use as prescribed.    Pain Procedures:   · 5/8/2018- Left knee Synvisc One injection  · 7/10/2018- Left genicular nerve block  · 7/31/2018- Left genicular cooled RFA  · 12/21/2018- Left knee steroid injection  · 1/31/2019- Left knee Euflexxa series  · 3/19/2019 Left genicular cooled RFA- 90% relief  · 7/19/19- Left knee genicular phenol chemodenervation-  · 6/19/2020- Left genicular RFA  · 1/22/2021- Left genicular RFA  · 6/2021- Left knee Orthovisc injection  · 7/30/2021- Left genicular RFA  · 12/14/2021- Left knee Monovisc injection    Physical Therapy//Home Exercise: yes, she did this in 2018 for one month (twice a week). She does HEP (stretching and ROM in the morning)    Imaging:   MRI Left Knee 7/21/2017 (in media):  The medial meniscus is intact. The  lateral meniscus is intact.      A chronic complete ACL tear is noted. The posterior cruciate ligament is intact. The medial and lateral retinacula are intact. The medial collateral ligament is intact. The lateral collateral ligament in intact. The posterolateral corner structures are intact.      There is full thickness fissuring of the central aspect medial femoral cartilage. The lateral tibiofemoral compartment cartilage is intact. There is broad thickness defect within the central trochlear cartilage. There is mild lateral patellofemoral cartilage thinning and regional full thickness loss of the central superior patellar cartilage.      A small effusion is present. There is trace infrapatellar fluid. Tiny popliteal cyst is noted. Subtle focal marrow edema is seen within the posterior tibial plateau. The bone marrow signal is heterogeneous likely reflecting marrow reconversion.      There is trace pes anserine bursitis. The tendons are otherwise normal.      Grade 2 fatty streaking of the semimembranosus and vastus lateralis muscles are noted. There is mild prepatellar edema.      MRI Right Shoulder 7/21/2017 (in media):   There is a complete tear of the supraspinatus tendon with retraction to the glenohumeral joint. There is partial tearing of the anterior infraspinatus articular surface. Mild subscapularis tendinosis is noted. The teres minor is intact. A small amount of subacromial/subdeltoid fluid is noted. The proximal long head biceps tendon is poorly visualized proximally although intact distally.      Degenerative tearing of the anterosuperior through posterosuperior labrum is noted. There is moderate to severe superior glenoid cartilage thinning with subchondral degenerative changes. The glenohumeral ligaments appear grossly intact.      An os acromiale is seen with fluid and osteophytosis at its junction with the base of the acromion. There is moderate superior subluxation of the AC joint with mild  osteophytosis.      Small effusion with subcoracoid extension is noted.      There is grade 2/3 fatty atrophy of the supraspinatus muscle, grade 2 of the infraspinatus and subscapularis. Heterogeneous signal is seen throughout the bone marrow likely reflecting marrow reconversion.        Allergies: Review of patient's allergies indicates:  No Known Allergies    Current Medications:   Current Outpatient Medications   Medication Sig Dispense Refill    acetaminophen (TYLENOL) 500 MG tablet       amLODIPine (NORVASC) 10 MG tablet TAKE 1 TABLET ONE TIME DAILY 90 tablet 1    atorvastatin (LIPITOR) 20 MG tablet TAKE 1 TABLET ONE TIME DAILY 90 tablet 1    B-complex with vitamin C (Z-BEC OR EQUIV) tablet Take 1 tablet by mouth once daily.       CALCIUM CITRATE-VITAMIN D2 ORAL Take 1 tablet by mouth once daily.       citalopram (CELEXA) 10 MG tablet Take 1 tablet (10 mg total) by mouth once daily. 90 tablet 3    ELIQUIS 5 mg Tab TAKE 1 TABLET TWICE DAILY 180 tablet 2    ferrous gluconate (FERGON) 324 MG tablet Take 1 tablet (324 mg total) by mouth daily with breakfast. 90 tablet 11    furosemide (LASIX) 80 MG tablet Take 1 tablet (80 mg total) by mouth 2 (two) times a day. 60 tablet 11    gabapentin (NEURONTIN) 300 MG capsule Take 2 capsules (600 mg total) by mouth 3 (three) times daily. 540 capsule 2    lactulose (CHRONULAC) 10 gram/15 mL solution Take 30 mLs (20 g total) by mouth 2 (two) times daily as needed (constipation). 120 mL 1    LIDOcaine-prilocaine (EMLA) cream Apply topically as needed (prior to port access). 30 g 0    lisinopriL (PRINIVIL,ZESTRIL) 40 MG tablet TAKE 1 TABLET ONE TIME DAILY 90 tablet 1    multivitamin with minerals tablet Take 1 tablet by mouth once daily.       oxyCODONE-acetaminophen (PERCOCET) 5-325 mg per tablet Take 1 tablet by mouth every 8 (eight) hours as needed for Pain. 90 tablet 0    pantoprazole (PROTONIX) 40 MG tablet Take 1 tablet (40 mg total) by mouth Daily. 90  tablet 1    semaglutide (OZEMPIC) 1 mg/dose (2 mg/1.5 mL) PnIj Inject 1 mg into the skin every 7 days. 2 pen 3     No current facility-administered medications for this visit.     Facility-Administered Medications Ordered in Other Visits   Medication Dose Route Frequency Provider Last Rate Last Admin    0.9%  NaCl infusion   Intravenous Continuous Tevin Clark MD   Stopped at 01/13/22 1055       REVIEW OF SYSTEMS:    GENERAL:  No weight loss, malaise or fevers.  HEENT:  Negative for frequent or significant headaches.  NECK:  Negative for lumps, goiter, pain and significant neck swelling.  RESPIRATORY:  Negative for cough, wheezing or shortness of breath. +LAURA  CARDIOVASCULAR:  Negative for chest pain, leg swelling or palpitations. HTN  GI:  Negative for abdominal discomfort, blood in stools or black stools or change in bowel habits. +GERD, gastric cancer actively in chemo  MUSCULOSKELETAL:  See HPI  SKIN:  Negative for lesions, rash, and itching.  PSYCH:  Positive for  mood disorder and recent psychosocial stressors. +sleep disturbance  HEMATOLOGY/LYMPHOLOGY:  Negative for prolonged bleeding, bruising easily or swollen nodes.  NEURO:   No history of headaches, syncope, paralysis, seizures or tremors.  ENDO: Thyroid disorder.   All other reviewed and negative other than HPI.    Past Medical History:  Past Medical History:   Diagnosis Date    YOUNG (acute kidney injury) 10/15/2021    Anemia     2018    Arthritis     BMI 60.0-69.9, adult     Cataract     Chronic diastolic congestive heart failure 1/27/2021    Depression     Dry eye syndrome     Gastric adenocarcinoma 5/25/2021    GERD (gastroesophageal reflux disease)     Glaucoma suspect     History of gastric ulcer     Hyperlipidemia     Hypertension     Hypothyroidism     Morbid obesity     Neuromuscular disorder     Sleep apnea     Thyroid disease        Past Surgical History:  Past Surgical History:   Procedure Laterality Date     APPENDECTOMY      CARDIAC SURGERY      CATARACT EXTRACTION W/  INTRAOCULAR LENS IMPLANT Bilateral     MFIOL OU    CORONARY ANGIOGRAPHY Bilateral 2/24/2021    Procedure: ANGIOGRAM, CORONARY ARTERY;  Surgeon: Cresencio Ridley MD;  Location: [facility] CATH LAB;  Service: Cardiology;  Laterality: Bilateral;  Covid test needed - ok per Danay MORENOU. Pt. w/o transportation or family    ENDOSCOPIC ULTRASOUND OF UPPER GASTROINTESTINAL TRACT N/A 3/17/2021    Procedure: ULTRASOUND, UPPER GI TRACT, ENDOSCOPIC;  Surgeon: Gerald Anaya MD;  Location: [facility] (2ND FLR);  Service: Endoscopy;  Laterality: N/A;  Pt unable to get a ride for swab. Will do rapid test at 8:30 - ttr    ENDOSCOPIC ULTRASOUND OF UPPER GASTROINTESTINAL TRACT N/A 1/13/2022    Procedure: ULTRASOUND, UPPER GI TRACT, ENDOSCOPIC;  Surgeon: Kevin Carballo MD;  Location: [facility] (2ND FLR);  Service: Endoscopy;  Laterality: N/A;  blood thinner approval received, see telephone encounter 1/7/22-BB  rapid  instructions given verbally and sent to address on file in pt's chart-BB    ESOPHAGOGASTRODUODENOSCOPY N/A 2/5/2021    Procedure: EGD (ESOPHAGOGASTRODUODENOSCOPY);  Surgeon: Matthew Hernadez MD;  Location: [facility] (2ND FLR);  Service: Endoscopy;  Laterality: N/A;  BMI-58    Wt: 361#     COVID test at [facility] on 2/2-GT    ESOPHAGOGASTRODUODENOSCOPY N/A 3/17/2021    Procedure: EGD (ESOPHAGOGASTRODUODENOSCOPY);  Surgeon: Gerald Anaya MD;  Location: [facility] (2ND FLR);  Service: Endoscopy;  Laterality: N/A;    ESOPHAGOGASTRODUODENOSCOPY N/A 5/25/2021    Procedure: EGD (ESOPHAGOGASTRODUODENOSCOPY);  Surgeon: Kevin Carballo MD;  Location: [facility] (2ND FLR);  Service: Endoscopy;  Laterality: N/A;  ESD 2 hours  Full COVID vaccination on file. ttr    ESOPHAGOGASTRODUODENOSCOPY N/A 1/13/2022    Procedure: EGD (ESOPHAGOGASTRODUODENOSCOPY);  Surgeon: Kevin Carballo MD;  Location: [facility] (2ND FLR);  Service: Endoscopy;  Laterality: N/A;  blood  thinner approval, see telephone encounter 1/7/22-BB  rapid  instructions given verbally and sent to address on file in pt's chart-BB    EYE SURGERY      INJECTION OF ANESTHETIC AGENT AROUND NERVE Left 7/10/2018    Procedure: BLOCK, NERVE;  Surgeon: Samantha Vidal MD;  Location: [facility] PAIN MGT;  Service: Pain Management;  Laterality: Left;  Genicular     INJECTION OF ANESTHETIC AGENT AROUND NERVE Left 7/19/2019    Procedure: Block, Nerve NERVE BLOCK GENICULAR WITH PHENOL 6%;  Surgeon: Samantha Vidal MD;  Location: [facility] PAIN [facility]GT;  Service: Pain Management;  Laterality: Left;  NEEDS CONSENT    INSERTION OF TUNNELED CENTRAL VENOUS CATHETER (CVC) WITH SUBCUTANEOUS PORT N/A 7/9/2021    Procedure: INSERTION, PORT-A-CATH;  Surgeon: Mario Paz MD;  Location: [facility] CATH LAB;  Service: Radiology;  Laterality: N/A;  PORT PLACEMENT    RADIOFREQUENCY ABLATION Left 6/19/2020    Procedure: RADIOFREQUENCY ABLATION LEFT GENICULAR;  Surgeon: Tevin Clark MD;  Location: [facility]IN [facility]GT;  Service: Pain Management;  Laterality: Left;  Left Genicular RFA    RADIOFREQUENCY ABLATION Left 1/22/2021    Procedure: RADIOFREQUENCY ABLATION LEFT GENICULAR;  Surgeon: Tevin Clark MD;  Location: [facility] PAIN [facility]GT;  Service: Pain Management;  Laterality: Left;    RADIOFREQUENCY ABLATION Left 7/30/2021    Procedure: RADIOFREQUENCY ABLATION, GENICULAR COOLED NEED CONSENT;  Surgeon: Tevin Clark MD;  Location: [facility]IN [facility];  Service: Pain Management;  Laterality: Left;    RADIOFREQUENCY ABLATION Left 4/22/2022    Procedure: RADIOFREQUENCY ABLATION, GENICULAR COOLED , LEFT;  Surgeon: Tevin Clark MD;  Location: [facility]IN [facility];  Service: Pain Management;  Laterality: Left;    TONSILLECTOMY         Family History:  Family History   Problem Relation Age of Onset    No Known Problems Mother     No Known Problems Father     Colon cancer Neg Hx     Esophageal cancer Neg Hx        Social History:  Social History     Socioeconomic  "History    Marital status: Single    Number of children: 2   Occupational History     Comment: retired  and [employer]   Tobacco Use    Smoking status: Current Some Day Smoker     Packs/day: 0.25     Years: 50.00     Pack years: 12.50     Types: Cigarettes     Last attempt to quit: 2020     Years since quittin.3    Smokeless tobacco: Never Used   Substance and Sexual Activity    Alcohol use: Yes     Comment: raely    Drug use: No    Sexual activity: Not Currently     Partners: Male   Social History Narrative    2 children (dtr in area) and she has 3 grandkids, lives alone. Prev  and [employer]       OBJECTIVE:    /64 (BP Location: Left arm, Patient Position: Sitting, BP Method: Medium (Automatic))   Pulse 78   Temp 97.3 °F (36.3 °C)   Ht 5' 6" (1.676 m)   Wt 123.7 kg (272 lb 11.3 oz)   BMI 44.02 kg/m²     PHYSICAL EXAMINATION:    General appearance: Well appearing, in no acute distress, alert and oriented x3.  Psych:  Mood and affect appropriate.  Skin: Skin color, texture, turgor normal, no rashes or lesions, in both upper and lower body.  Head/face:  Atraumatic, normocephalic.   Pulm: Symmetric chest rise, no respiratory distress noted.   Extremities:  No deformities or skin discoloration. Good capillary refill. +2 pitting edema to bilateral feet.   Musculoskeletal: There is mild pain with palpation over medial and lateral joint line of left knee. Limited ROM with mild pain on extension of left knee. Crepitus noted to left knee. No atrophy or tone abnormalities are noted.  Neuro:  Plantar response are downgoing. No loss of sensation is noted.  Gait: Antalgic- ambulates with wheelchair.     ASSESSMENT: 71 y.o. year old female with left knee pain, consistent with the followin. Chronic pain of left knee     2. Primary osteoarthritis of left knee     3. Gastric adenocarcinoma     4. Chronic pain disorder           PLAN:     - Previous imaging reviewed today. Labs reviewed. "     - She is s/p left genicular RFA with benefit.    - We can repeat left knee Monovisc injection with benefit.     - Continue follow up with Oncology for chemotherapy.     - Pain contract signed 8/11/2020.    - Continue Percocet 5/325 mg TID PRN. Refill sent with appropriate date.     - The patient is here today for a refill of current pain medications and they believe these provide effective pain control and improvements in quality of life.  The patient notes no serious side effects, and feels the benefits outweigh the risks.  The patient was reminded of the pain contract that they signed previously as well as the risks and benefits of the medication including possible death.  The updated [place] Board  Pharmacy prescription monitoring program was reviewed, and the patient has been found to be compliant with current treatment plan.    - Previous serum drug of abuse reviewed. Repeat UDS next visit.     - Continue Gabapentin.     - I have stressed the importance of physical activity and a home exercise plan to help with pain and improve health.    - RTC in 3 months or sooner if needed.     - Dr. Clark was consulted on the patient and agrees with this plan.    The above plan and management options were discussed at length with patient. Patient is in agreement with the above and verbalized understanding.    Savanna Hyatt NP  05/16/2022

## 2022-05-24 ENCOUNTER — OFFICE VISIT (OUTPATIENT)
Dept: INTERNAL MEDICINE | Facility: CLINIC | Age: 72
End: 2022-05-24
Payer: MEDICARE

## 2022-05-24 VITALS
HEIGHT: 66 IN | BODY MASS INDEX: 43.96 KG/M2 | HEART RATE: 91 BPM | OXYGEN SATURATION: 97 % | RESPIRATION RATE: 18 BRPM | DIASTOLIC BLOOD PRESSURE: 64 MMHG | SYSTOLIC BLOOD PRESSURE: 112 MMHG | WEIGHT: 273.56 LBS

## 2022-05-24 DIAGNOSIS — I10 ESSENTIAL HYPERTENSION: ICD-10-CM

## 2022-05-24 DIAGNOSIS — M25.512 CHRONIC LEFT SHOULDER PAIN: ICD-10-CM

## 2022-05-24 DIAGNOSIS — G89.29 CHRONIC LEFT SHOULDER PAIN: ICD-10-CM

## 2022-05-24 DIAGNOSIS — I82.4Z9 ACUTE DEEP VEIN THROMBOSIS (DVT) OF DISTAL VEIN OF LOWER EXTREMITY, UNSPECIFIED LATERALITY: ICD-10-CM

## 2022-05-24 DIAGNOSIS — M54.2 NECK PAIN ON LEFT SIDE: Primary | ICD-10-CM

## 2022-05-24 PROCEDURE — 3078F PR MOST RECENT DIASTOLIC BLOOD PRESSURE < 80 MM HG: ICD-10-PCS | Mod: CPTII,S$GLB,, | Performed by: INTERNAL MEDICINE

## 2022-05-24 PROCEDURE — 99214 OFFICE O/P EST MOD 30 MIN: CPT | Mod: S$GLB,,, | Performed by: INTERNAL MEDICINE

## 2022-05-24 PROCEDURE — 1101F PT FALLS ASSESS-DOCD LE1/YR: CPT | Mod: CPTII,S$GLB,, | Performed by: INTERNAL MEDICINE

## 2022-05-24 PROCEDURE — 1125F AMNT PAIN NOTED PAIN PRSNT: CPT | Mod: CPTII,S$GLB,, | Performed by: INTERNAL MEDICINE

## 2022-05-24 PROCEDURE — 4010F PR ACE/ARB THEARPY RXD/TAKEN: ICD-10-PCS | Mod: CPTII,S$GLB,, | Performed by: INTERNAL MEDICINE

## 2022-05-24 PROCEDURE — 4010F ACE/ARB THERAPY RXD/TAKEN: CPT | Mod: CPTII,S$GLB,, | Performed by: INTERNAL MEDICINE

## 2022-05-24 PROCEDURE — 1125F PR PAIN SEVERITY QUANTIFIED, PAIN PRESENT: ICD-10-PCS | Mod: CPTII,S$GLB,, | Performed by: INTERNAL MEDICINE

## 2022-05-24 PROCEDURE — 1101F PR PT FALLS ASSESS DOC 0-1 FALLS W/OUT INJ PAST YR: ICD-10-PCS | Mod: CPTII,S$GLB,, | Performed by: INTERNAL MEDICINE

## 2022-05-24 PROCEDURE — 99214 PR OFFICE/OUTPT VISIT, EST, LEVL IV, 30-39 MIN: ICD-10-PCS | Mod: S$GLB,,, | Performed by: INTERNAL MEDICINE

## 2022-05-24 PROCEDURE — 3074F SYST BP LT 130 MM HG: CPT | Mod: CPTII,S$GLB,, | Performed by: INTERNAL MEDICINE

## 2022-05-24 PROCEDURE — 3074F PR MOST RECENT SYSTOLIC BLOOD PRESSURE < 130 MM HG: ICD-10-PCS | Mod: CPTII,S$GLB,, | Performed by: INTERNAL MEDICINE

## 2022-05-24 PROCEDURE — 1160F RVW MEDS BY RX/DR IN RCRD: CPT | Mod: CPTII,S$GLB,, | Performed by: INTERNAL MEDICINE

## 2022-05-24 PROCEDURE — 99999 PR PBB SHADOW E&M-EST. PATIENT-LVL V: ICD-10-PCS | Mod: PBBFAC,,, | Performed by: INTERNAL MEDICINE

## 2022-05-24 PROCEDURE — 3008F PR BODY MASS INDEX (BMI) DOCUMENTED: ICD-10-PCS | Mod: CPTII,S$GLB,, | Performed by: INTERNAL MEDICINE

## 2022-05-24 PROCEDURE — 1159F MED LIST DOCD IN RCRD: CPT | Mod: CPTII,S$GLB,, | Performed by: INTERNAL MEDICINE

## 2022-05-24 PROCEDURE — 3078F DIAST BP <80 MM HG: CPT | Mod: CPTII,S$GLB,, | Performed by: INTERNAL MEDICINE

## 2022-05-24 PROCEDURE — 1159F PR MEDICATION LIST DOCUMENTED IN MEDICAL RECORD: ICD-10-PCS | Mod: CPTII,S$GLB,, | Performed by: INTERNAL MEDICINE

## 2022-05-24 PROCEDURE — 3288F FALL RISK ASSESSMENT DOCD: CPT | Mod: CPTII,S$GLB,, | Performed by: INTERNAL MEDICINE

## 2022-05-24 PROCEDURE — 1160F PR REVIEW ALL MEDS BY PRESCRIBER/CLIN PHARMACIST DOCUMENTED: ICD-10-PCS | Mod: CPTII,S$GLB,, | Performed by: INTERNAL MEDICINE

## 2022-05-24 PROCEDURE — 99999 PR PBB SHADOW E&M-EST. PATIENT-LVL V: CPT | Mod: PBBFAC,,, | Performed by: INTERNAL MEDICINE

## 2022-05-24 PROCEDURE — 3288F PR FALLS RISK ASSESSMENT DOCUMENTED: ICD-10-PCS | Mod: CPTII,S$GLB,, | Performed by: INTERNAL MEDICINE

## 2022-05-24 PROCEDURE — 3008F BODY MASS INDEX DOCD: CPT | Mod: CPTII,S$GLB,, | Performed by: INTERNAL MEDICINE

## 2022-05-24 NOTE — PROGRESS NOTES
Subjective:       Patient ID: DARCY Coats is a 71 y.o. female.    Chief Complaint: Arm Pain, Neck Pain, and Headache (Head pain more than headache radiating down left side)    Patient is here for followup for chronic conditions.    chronic tiredness.    Having pain L shoulder, L side of the neck too. Pains have been for a few months, denies any inj.    Review of Systems   Constitutional: Negative for activity change, appetite change and unexpected weight change.   Eyes: Negative for visual disturbance.   Respiratory: Negative for cough, chest tightness and shortness of breath.    Cardiovascular: Negative for chest pain.   Gastrointestinal: Negative for abdominal distention and abdominal pain.   Genitourinary: Negative for difficulty urinating and urgency.        No breast lumps/masses/pain/nipple d/c in either breast     Musculoskeletal: Positive for arthralgias and neck pain.   Skin: Negative for rash and wound.   Psychiatric/Behavioral: Positive for dysphoric mood. Negative for confusion.           Objective:      Physical Exam  Vitals reviewed.   Constitutional:       General: She is not in acute distress.     Appearance: Normal appearance. She is well-developed. She is obese. She is not ill-appearing, toxic-appearing or diaphoretic.      Comments: In wheelchair, diff to rise to exam table.  Here with her aide   HENT:      Head: Normocephalic and atraumatic.   Eyes:      General: No scleral icterus.     Pupils: Pupils are equal, round, and reactive to light.   Neck:      Thyroid: No thyromegaly.   Cardiovascular:      Rate and Rhythm: Normal rate and regular rhythm.      Heart sounds: Normal heart sounds. No murmur heard.    No friction rub. No gallop.   Pulmonary:      Effort: Pulmonary effort is normal. No respiratory distress.      Breath sounds: Normal breath sounds. No wheezing or rales.   Abdominal:      General: Bowel sounds are normal. There is no distension.      Palpations: Abdomen is soft. There  is no mass.      Tenderness: There is no abdominal tenderness. There is no guarding or rebound.   Musculoskeletal:         General: No tenderness. Normal range of motion.      Cervical back: Normal range of motion.      Comments: painful L knee rom.    Painful L shoulder arc rom.  Mild pain along the L shoulder w/o redness or warmth.  I am able to move L should with pain from PROM<<AROM  The L RC demonstrates weakness to resistance/open can testing    Also painful neck rom and painful Dimitrios test L side  No significant midline C spine tenderness to deep palpation     Lymphadenopathy:      Cervical: No cervical adenopathy.   Neurological:      General: No focal deficit present.      Mental Status: She is alert and oriented to person, place, and time.   Psychiatric:         Speech: Speech normal.         Behavior: Behavior normal.         Thought Content: Thought content normal.         Assessment:       1. Neck pain on left side    2. Chronic left shoulder pain    3. Essential hypertension    4. Acute deep vein thrombosis (DVT) of distal vein of lower extremity, unspecified laterality        Plan:       F was seen today for arm pain, neck pain and headache.    Diagnoses and all orders for this visit:    Neck pain on left side  Likely OA, discussed heating pad    Chronic left shoulder pain  Possible cuff tear, definitely tendonitis. Offered ortho referral, she prefers doing HEP    Essential hypertension  controlled    Acute deep vein thrombosis (DVT) of distal vein of lower extremity, unspecified laterality  On [employer]      Health Maintenance       Date Due Completion Date    COVID-19 Vaccine (4 - Booster for Moderna series) 04/15/2022 12/15/2021    Mammogram 08/12/2022 8/12/2021    DEXA Scan 01/02/2023 1/2/2020    Lipid Panel 12/03/2025 12/3/2020    Colorectal Cancer Screening 11/26/2028 11/26/2018    TETANUS VACCINE 01/02/2030 1/2/2020          Follow up in about 6 months (around 11/24/2022) for Moderna 2nd  booster please.    Future Appointments   Date Time Provider Department Center   6/20/2022  9:30 AM Shiraz Watson, PhD [facility] CAN [facility] Alves   6/23/2022  9:30 AM LAB, SAME DAY [facility]DRAW [facility]   6/23/2022 10:30 AM Cathy Pelaez MD [facility] HEM ONC [facility]   8/16/2022 10:20 AM Savanna Hyatt NP [facility] PAINMGT [facility]   11/28/2022 10:40 AM Lei Garcia MD [facility] Dario RAZO

## 2022-05-24 NOTE — PATIENT INSTRUCTIONS
Exercises for Shoulder Flexibility: Internal Rotation    This stretch can help restore shoulder flexibility and relieve pain over time. When stretching, be sure to breathe deeply. And follow any special instructions from your doctor or physical therapist.  While seated, move the arm on the side you want to stretch toward the middle of your back. The palm of your hand should face out.  Cup your other hand under the hand thats behind your back. Gently push your cupped hand upward until you feel the stretch in the shoulder. Try to hold the stretch for 5 seconds.  Work up to doing 3 sets of this stretch, 3 times a day. Work up to holding the stretch for 30-60 seconds.  Note: Keep your back straight. Its okay if your hand cant reach the middle of your back. Instead, start the stretch with your hand as close as you can get it to the middle of your back.     Frozen Shoulder  Frozen shoulder is another name for adhesive capsulitis, which causes restricted movement in the shoulder. If you have frozen shoulder, this stretch may cause discomfort, especially when you first get started. A few months may pass before you achieve the results you want. But once your shoulder heals, it almost never becomes frozen again. So stick to your stretching program. If you have any questions, be sure to ask your doctor.   © 7044-0325 [employer]. [address] [place] [address], [place] [address]. All rights reserved. This information is not intended as a substitute for professional medical care. Always follow your healthcare professional's instructions.        Exercises for Shoulder Flexibility: Wall Walk  Improving your flexibility can reduce pain. Stretching exercises also can help increase your range of pain-free motion. Breathe normally when you exercise. And try to use smooth, fluid movements.  Note: Follow any special instructions you are given. If you feel pain, stop the exercise. If the pain continues after stopping, call your  healthcare provider.  Stand with your shoulder about 2 feet from the wall.  Raise your arm to shoulder level and gently walk your fingers up the wall as high as you can.  Hold for a few seconds. Then walk your fingers back down.  Repeat 3 times. Move closer to the wall as you repeat.  Build up to holding each stretch for 30 seconds.  CAUTION: Do this stretch only if your healthcare provider recommends it. Dont do it when you are first injured.          © 6926-3953 [employer]. [address] [place] [address]. All rights reserved. This information is not intended as a substitute for professional medical care. Always follow your healthcare professional's instructions.        Exercises for Shoulder Flexibility: Pendulum Exercise   Improving your flexibility can reduce pain. Stretching exercises also can help increase your range of pain-free motion. Breathe normally when you exercise. And try to use smooth, fluid movements.  Follow any special instructions you are given. If you feel pain, stop the exercise. If the pain continues after stopping, call your health care provider.  Lean over with your good arm supported on a table or chair.  Relax the arm on the painful side, letting it hang straight down.  Slowly begin to swing the relaxed arm. Move it in a small [place], gradually making it bigger if you can. Then reverse the direction. Next, move it backward and forward. Finally, move it side to side.     Note: Spend about 5 minutes doing the exercise, 3 times a day. Change direction after 1 minute of motion.   © 6789-4123 [employer]. [address] [place] [address]. All rights reserved. This information is not intended as a substitute for professional medical care. Always follow your healthcare professional's instructions.        Exercises for Shoulder Flexibility: Broom Stretch    Improving your flexibility can reduce pain. Stretching exercises also can help increase  your range of pain-free motion. Breathe normally when you exercise. Try to use smooth, fluid movements. Never force a stretch.  Stand up or lie on the floor. Place the palm of your hand over the end of a broomstick or cane. Grasp the stick farther down with the other hand, palm facing down.  Push the end of the stick up on the side of your injured shoulder as high as is comfortable.  Hold for a few seconds. Return to the starting position.  Repeat 3-5 times. Build up to holding each stretch for 10-30  seconds.  Note: Follow any special instructions you are given. If you feel pain, stop the exercise. If the pain continues after stopping, call your healthcare provider.   © 6626-2640 [employer]. [address] [place] [address], [place] [address]. All rights reserved. This information is not intended as a substitute for professional medical care. Always follow your healthcare professional's instructions.

## 2022-05-31 DIAGNOSIS — M25.562 CHRONIC PAIN OF LEFT KNEE: ICD-10-CM

## 2022-05-31 DIAGNOSIS — M17.12 PRIMARY OSTEOARTHRITIS OF LEFT KNEE: ICD-10-CM

## 2022-05-31 DIAGNOSIS — G89.29 CHRONIC PAIN OF LEFT KNEE: ICD-10-CM

## 2022-05-31 DIAGNOSIS — G89.4 CHRONIC PAIN DISORDER: ICD-10-CM

## 2022-05-31 DIAGNOSIS — I10 ESSENTIAL HYPERTENSION: ICD-10-CM

## 2022-05-31 DIAGNOSIS — C16.9 GASTRIC ADENOCARCINOMA: ICD-10-CM

## 2022-05-31 RX ORDER — OXYCODONE AND ACETAMINOPHEN 5; 325 MG/1; MG/1
1 TABLET ORAL EVERY 8 HOURS PRN
Qty: 90 TABLET | Refills: 0 | Status: SHIPPED | OUTPATIENT
Start: 2022-05-31 | End: 2022-06-27 | Stop reason: SDUPTHER

## 2022-05-31 RX ORDER — LISINOPRIL 40 MG/1
TABLET ORAL
Qty: 90 TABLET | Refills: 3 | Status: SHIPPED | OUTPATIENT
Start: 2022-05-31 | End: 2022-06-01

## 2022-05-31 RX ORDER — AMLODIPINE BESYLATE 10 MG/1
TABLET ORAL
Qty: 90 TABLET | Refills: 3 | Status: SHIPPED | OUTPATIENT
Start: 2022-05-31 | End: 2022-06-01

## 2022-05-31 NOTE — TELEPHONE ENCOUNTER
----- Message from Cuong Mendez sent at 5/31/2022 11:20 AM CDT -----  Contact: [phone]  Requesting an RX refill or new RX.  Is this a refill or new RX: new  RX name and strength (copy/paste from chart):  amLODIPine (NORVASC) 10 MG tablet  Is this a 30 day or 90 day RX: 90  Pharmacy name and phone # (copy/paste from chart):      [facility] - [place] [id number] [facility]  [address] [id number]  Phone: [phone] Fax: [phone]    The doctors have asked that we provide their patients with the following 2 reminders -- prescription refills can take up to 72 hours, and a friendly reminder that in the future you can use your [facility] account to request refills: call back     Requesting an RX refill or new RX.  Is this a refill or new RX: new  RX name and strength (copy/paste from chart):  lisinopriL (PRINIVIL,ZESTRIL) 40 MG tablet  Is this a 30 day or 90 day RX: 90  Pharmacy name and phone # (copy/paste from chart):      [facility] - [place] [id number] [facility]  [address] [id number]  Phone: [phone] Fax: [phone]      The doctors have asked that we provide their patients with the following 2 reminders -- prescription refills can take up to 72 hours, and a friendly reminder that in the future you can use your [facility] account to request refills: call back

## 2022-05-31 NOTE — TELEPHONE ENCOUNTER
Refill Authorization Note   F Pauly  is requesting a refill authorization.  Brief Assessment and Rationale for Refill:  Approve     Medication Therapy Plan:       Medication Reconciliation Completed: No   Comments:     No Care Gaps recommended.     Note composed:4:18 PM 05/31/2022

## 2022-05-31 NOTE — TELEPHONE ENCOUNTER
Care Due:                  Date            Visit Type   Department     Provider  --------------------------------------------------------------------------------                                EP -                              PRIMARY      NOM INTERNAL  Last Visit: 05-      CARE ([facility])   MEDICINE       Lei Garcia                               -                              PRIMARY      NOM INTERNAL  Next Visit: 11-      CARE ([facility])   MEDICINE       Lei Garcia                                                            Last  Test          Frequency    Reason                     Performed    Due Date  --------------------------------------------------------------------------------    HBA1C.......  6 months...  semaglutide..............  06- 12-    Lipid Panel.  12 months..  atorvastatin.............  12- 11-    [facility] Embedded Care Gaps. Reference number: [id number]. 5/31/2022   1:46:02 PM CDT

## 2022-05-31 NOTE — TELEPHONE ENCOUNTER
----- Message from Arron Wu sent at 5/31/2022  9:53 AM CDT -----   Name of Who is Calling:     What is the request in detail: patient request call back in reference to medication  oxyCODONE-acetaminophen (PERCOCET) 5-325 mg per tablet   / patient state medication is not at [facility] pharmacy and want to know if can send to [facility]    / patient state she is in pain   Please contact to further discuss and advise      Can the clinic reply by [facility]:     What Number to Call Back if not in [facility]:  [phone]

## 2022-05-31 NOTE — TELEPHONE ENCOUNTER
No new care gaps identified.  [facility] Embedded Care Gaps. Reference number: [id number]. 5/31/2022   3:19:48 PM CDT

## 2022-06-01 RX ORDER — LISINOPRIL 40 MG/1
TABLET ORAL
Qty: 90 TABLET | Refills: 1 | Status: SHIPPED | OUTPATIENT
Start: 2022-06-01 | End: 2022-11-28 | Stop reason: SDUPTHER

## 2022-06-01 RX ORDER — AMLODIPINE BESYLATE 10 MG/1
TABLET ORAL
Qty: 90 TABLET | Refills: 1 | Status: SHIPPED | OUTPATIENT
Start: 2022-06-01 | End: 2022-11-28 | Stop reason: SDUPTHER

## 2022-06-17 DIAGNOSIS — Z00.00 PREVENTATIVE HEALTH CARE: Primary | ICD-10-CM

## 2022-06-17 DIAGNOSIS — Z12.31 ENCOUNTER FOR SCREENING MAMMOGRAM FOR MALIGNANT NEOPLASM OF BREAST: ICD-10-CM

## 2022-06-17 RX ORDER — SEMAGLUTIDE 1.34 MG/ML
INJECTION, SOLUTION SUBCUTANEOUS
Qty: 9 PEN | Refills: 11 | Status: SHIPPED | OUTPATIENT
Start: 2022-06-17 | End: 2023-07-13

## 2022-06-17 NOTE — TELEPHONE ENCOUNTER
----- Message from Jayne Hyde sent at 6/17/2022  2:04 PM CDT -----  Regarding: Orders  Contact: Patient  Patient is requesting an order to have mammogram completed   Patient last mammogram was 02/28/2021   Please Assist     Patient can be reached at [phone]

## 2022-06-17 NOTE — TELEPHONE ENCOUNTER
Refill Routing Note   Medication(s) are not appropriate for processing by [facility] for the following reason(s):      - Required laboratory values are outdated    [facility] action(s):  Defer          Medication reconciliation completed: No     Appointments  past 12m or future 3m with PCP    Date Provider   Last Visit   5/24/2022 Lei Garcia MD   Next Visit   11/28/2022 Lei Garcia MD   ED visits in past 90 days: 0        Note composed:3:44 PM 06/17/2022

## 2022-06-17 NOTE — TELEPHONE ENCOUNTER
No new care gaps identified.  [facility] Embedded Care Gaps. Reference number: [id number]. 6/17/2022   2:23:18 PM CDT

## 2022-06-20 ENCOUNTER — OFFICE VISIT (OUTPATIENT)
Dept: PSYCHIATRY | Facility: CLINIC | Age: 72
End: 2022-06-20
Payer: MEDICARE

## 2022-06-20 DIAGNOSIS — C16.9 GASTRIC ADENOCARCINOMA: ICD-10-CM

## 2022-06-20 DIAGNOSIS — F32.0 CURRENT MILD EPISODE OF MAJOR DEPRESSIVE DISORDER WITHOUT PRIOR EPISODE: Primary | ICD-10-CM

## 2022-06-20 PROCEDURE — 4010F ACE/ARB THERAPY RXD/TAKEN: CPT | Mod: CPTII,S$GLB,, | Performed by: PSYCHOLOGIST

## 2022-06-20 PROCEDURE — 99999 PR PBB SHADOW E&M-EST. PATIENT-LVL II: ICD-10-PCS | Mod: PBBFAC,,, | Performed by: PSYCHOLOGIST

## 2022-06-20 PROCEDURE — 90791 PSYCH DIAGNOSTIC EVALUATION: CPT | Mod: S$GLB,,, | Performed by: PSYCHOLOGIST

## 2022-06-20 PROCEDURE — 4010F PR ACE/ARB THEARPY RXD/TAKEN: ICD-10-PCS | Mod: CPTII,S$GLB,, | Performed by: PSYCHOLOGIST

## 2022-06-20 PROCEDURE — 1160F RVW MEDS BY RX/DR IN RCRD: CPT | Mod: CPTII,S$GLB,, | Performed by: PSYCHOLOGIST

## 2022-06-20 PROCEDURE — 90791 PR PSYCHIATRIC DIAGNOSTIC EVALUATION: ICD-10-PCS | Mod: S$GLB,,, | Performed by: PSYCHOLOGIST

## 2022-06-20 PROCEDURE — 99999 PR PBB SHADOW E&M-EST. PATIENT-LVL II: CPT | Mod: PBBFAC,,, | Performed by: PSYCHOLOGIST

## 2022-06-20 PROCEDURE — 1159F PR MEDICATION LIST DOCUMENTED IN MEDICAL RECORD: ICD-10-PCS | Mod: CPTII,S$GLB,, | Performed by: PSYCHOLOGIST

## 2022-06-20 PROCEDURE — 1160F PR REVIEW ALL MEDS BY PRESCRIBER/CLIN PHARMACIST DOCUMENTED: ICD-10-PCS | Mod: CPTII,S$GLB,, | Performed by: PSYCHOLOGIST

## 2022-06-20 PROCEDURE — 1159F MED LIST DOCD IN RCRD: CPT | Mod: CPTII,S$GLB,, | Performed by: PSYCHOLOGIST

## 2022-06-20 NOTE — PROGRESS NOTES
"  PSYCHO-ONCOLOGY INTAKE    Diagnostic Interview - CPT 29754    Date: 6/20/2022  Site: [facility]     Evaluation Length (direct face-to-face time):  1 hour     Referral Source: Sonu Darden MD   Oncologist: Latanya   PCP: Lei Garcia MD    Clinical status of patient: Outpatient    DARCY Coats, a 71 y.o. female, seen for initial evaluation visit.  Met with patient.    Chief complaint/reason for encounter: adjustment to illness, depression    Medical/Surgical History:    Patient Active Problem List   Diagnosis    Primary osteoarthritis of left knee    Obstructive sleep apnea    Chronic knee pain    Left knee DJD    Essential hypertension    Major depressive disorder    Class 3 severe obesity due to excess calories with serious comorbidity and body mass index (BMI) of 50.0 to 59.9 in adult    Joint pain    Gait instability    At high risk for injury related to fall    Debility    Hypothyroidism    Left knee pain    Osteopenia of neck of left femur    Gastroesophageal reflux disease    Chronic diastolic congestive heart failure    Pure hypercholesterolemia    Tobacco abuse    Abnormal cardiovascular stress test    Gastric adenocarcinoma    Iron deficiency anemia secondary to blood loss (chronic)    Chronic pain    History of DVT (deep vein thrombosis)    Current mild episode of major depressive disorder without prior episode       Health Behaviors:       ETOH Use: Yes (rare)       Tobacco Use: Yes (currently smoking most days, 1-3 cigarettes "when I'm really stressed")   Illicit Drug Use:  No     Prescription Misuse:No   Caffeine: minimal   Exercise:The patient engages in little, if any physical activity.    Past Psychiatric History:   Inpatient treatment:  As per previous psychological evaluation: "Ms. Coats denies any history of psychiatric symptoms. She describes two situations in the past several years where she was sent for an emergency psychiatric evaluation, which " "she believes were "misunderstandings".  1) In , Ms. Coats suddenly got the news that she was being evicted from her apartment of 27 years, and then 2 days after found out that one of her closest friends . She states that she was very distraught and started drinking heavily to deal with her sadness. She went to a doctor for an appointment and told him that she "can't take it anymore" when describing her emotional experience while crying heavily, and she reports that she was taken to the ED and then transferred to a psychiatric facility in [place], where she stayed for 10 days. She is adamant that she was not suicidal and did not need any treatment, and that the doctor misunderstood what she meant; however, she was kept that long allegedly due to the facility being unable to contact and verify psych history with a doctor.  2) In 2018, Ms. Coats was denied bariatric surgery "last minute" by [facility] surgeon, who saw nicotine on her lab work the day of her surgery. She reports that she was not smoking but the nicotine was due to her patch for quitting but the doctor still denied her the surgery. She was again distraught and stated that she would "rather die than have to live with the knee pain" and that she "couldn't take it anymore" while she was crying. She was sent to the ED for an evaluation by a psychiatrist, whom she stated released her right away. She denied any intent to hurt or kill herself."     Outpatient treatment: No     Prior substance abuse treatment: No Alcohol/Drug Abuse: No drug use; Rare EtOH currently. As per previous psychological evaluatoin: "Ms. Coats reports that she quit drinking 15 years ago due to feeling dependent on alcohol. However, she had a 3 week long relapse in , when she was drinking nightly to go to sleep while depressed and anxious, just prior to being placed in the psychiatric hospital. She reports no alcohol use in 4 years.   Ms. Coats reports " "that she quit drinking 15 years ago due to feeling dependent on alcohol. However, she had a 3 week long relapse in 2015, when she was drinking nightly to go to sleep while depressed and anxious, just prior to being placed in the psychiatric hospital. She reports no alcohol use in 4 years."     Suicide Attempts: No ("against my Hoahaoism and I wouldn't do that to my children")    Psychotropic Medications:  Current: Celexa (10 mg since 8/2021 prescribed by PCP- feels it has helped her be less irritable)       Past: Desyrel, Effexor and Lexapro  noted on chart, patient has no memory of having been prescribed or taken these medications    Current medications as per below, allergies reviewed in chart.    Current Outpatient Medications   Medication    acetaminophen (TYLENOL) 500 MG tablet    amLODIPine (NORVASC) 10 MG tablet    atorvastatin (LIPITOR) 20 MG tablet    B-complex with vitamin C (Z-BEC OR EQUIV) tablet    CALCIUM CITRATE-VITAMIN D2 ORAL    citalopram (CELEXA) 10 MG tablet    ELIQUIS 5 mg Tab    ferrous gluconate (FERGON) 324 MG tablet    furosemide (LASIX) 80 MG tablet    gabapentin (NEURONTIN) 300 MG capsule    lactulose (CHRONULAC) 10 gram/15 mL solution    LIDOcaine-prilocaine (EMLA) cream    lisinopriL (PRINIVIL,ZESTRIL) 40 MG tablet    multivitamin with minerals tablet    oxyCODONE-acetaminophen (PERCOCET) 5-325 mg per tablet    OZEMPIC 1 mg/dose (4 mg/3 mL)    pantoprazole (PROTONIX) 40 MG tablet     No current facility-administered medications for this visit.     Facility-Administered Medications Ordered in Other Visits   Medication Frequency    0.9%  NaCl infusion Continuous          Social situation/Stressors: Per Prior psychological evaluation: "Ms. Coats was born [place] raised [place] by her biological parents. She considers her ethnicity to be Creole and reports that her first language is also Creole. She has 10 siblings. Ms. Coats describes a "happy" " "childhood and denies a history of abuse or trauma. Ms. Coats completed formal schooling at the age of 16, which she states was commonplace in her country at the time. 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 Since that time Ms. 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 Ms. Coats is single and has a history of one brief marriage 40 years ago ("I learned that my  was  to 2 other women in [place] so I left him"). She has 2 children, ages 50 and 40. She rents in an apartment in the [facility], but has been living temporarily in a nursing home (through [facility]) for the past 3 months to rehab her knee since she has been unable to care well for herself. She reports to be very unhappy at this facility and is hoping to move home in the next few weeks, with the help of an aide who will be there 4 hours per day. Ms. Coats identifies as Judaism."    DARCY Coats lives alone in [place]. She has a care attendant that comes 5x/week for 5 hours/day. She is not currently in a romantic relationship. She has minimal social support (daughter and a few neighbors). She sees her daughter "occasionally," but talks to her several times per week. She also speaks by phone to her former employer. She has 6 living siblings and last visited her home country in 2014 (would like to go, but feels she cannot go until she is healthierdue to healthcare limitations there).  DARCY Coats is not currently attending [employer] services due to transportation struggles ([employer] does not work on weekends).  DARCY Coats's hobbies include cooking and music (prior - going to " "the [employer]). SHe previously worked 10-12 hours/day, so had minimal time for hobbies (enjoyed her work).  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 She stated she was unsure why she was referred for services. She elected to participate in the interview, anyway. The patient was cooperative throughout the interview and was an adequate historian.  Patient was using a wheelchair for mobility.  Appearance: age appropriate, casually  dressed, adequately  groomed  Behavior/Cooperation: friendly and cooperative  Speech: lightly accented, normal in rate, volume, and tone and appropriate quality, quantity and organization of sentences  Mood: dysthymic  Affect: dysphoric  Thought Process: goal-directed, logical  Thought Content: normal, no suicidality, no homicidality, delusions, or paranoia;did not appear to be responding to internal stimuli during the interview.   Orientation: grossly intact  Memory: Grossly intact  Attention Span/Concentration: Attends to interview without distraction; reports no difficulty  Fund of Knowledge: average  Estimate of Intelligence: average from verbal skills and history  Cognition: grossly intact  Insight: patient has awareness of illness; good insight into own behavior and behavior of others  Judgment: the patient's behavior is adequate to circumstances    Distress Score    Distress Score: 3        Practical Problems Physical Problems                                                   Family Problems                                         Emotional Problems            "                                              Spiritual/Religions Concerns               Other Problems              History of present illness:    Oncology History   Gastric adenocarcinoma   2/5/2021 Procedure    EGD  Feb 2021 EGD- Gastric tumor in the prepyloric region of the stomach,     3/17/2021 Procedure    EUS  Impression:           - Gastric tumor in the prepyloric region of the                         stomach. Biopsied. Lesion appears amenable to                         endoscopic resection (ESD) - Dr. Carballo was present                         to view the lesion.      5/25/2021 Initial Diagnosis    Gastric adenocarcinoma     5/25/2021 Pathology Significant Finding    Adenocarcinoma, moderate to poorly differentiated   Tumor invades deep layers of submuocsa with deep margin extensively positive   Deep margin is extensively positive   Lateral margins are negative for neoplasia or malignancy      5/25/2021 Cancer Staged    Staging form: Stomach, AJCC 8th Edition  - Pathologic stage from 5/25/2021: Stage Unknown (pT1, pNX, cM0)     6/18/2021 Imaging Significant Findings    CT CAP   Asymmetric gastric wall thickening at the level of the gastric antrum presumably representing the patient's gastric adenocarcinoma.  I see no CT evidence for metastatic disease.     Low-density focus lower pole left kidney does not meet criteria for a simple cyst.  Findings can be further evaluated with ultrasound.  Additional subcentimeter low-density foci in the right kidney likely too small to characterize by imaging.     6/28/2021 Tumor Conference       [facility] UGI MULTIDISCIPLINARY TUMOR BOARD  PATIENT REVIEW FORM   ____________________________________________________________    CLINIC #: [id number]  DATE: 6/28/2021    DIAGNOSIS: gastric CA    PRESENTER: Latanya/ Donnie Sanders    PATIENT SUMMARY:   This 69 y/o female had incidental finding of gastric CA on EGD as part of bariatric evaluation given BMI 57. Underwent  ESD per Dr. Carballo. Reviewed pathology - pT1b with LVI positive, extensive tumor at deep margin.     BOARD RECOMMENDATIONS:   Recommend perioperative chemotherapy, 4 cycles of FLOT    CONSULT NEEDED:     [] Surgery    [x] Hem/Onc    [] Rad/Onc    [] Dietary                 [] Social Service    [] Psychology       [] AES  [] Radiology     ESD Pathology Stage: Tumor 1b  Node(s) 0 Metastasis 0      GROUP STAGE:  [] O    [] 1A    [] IB    [] IIA    [] IIB     [] IIIA     [] IIIB     [] IIIC    []IV  [] Local recurrence     [] Regional recurrence     [] Distant recurrence   Metastatic site(s): none         [x] Jennifer'l Treatment Guidelines reviewed and care planned is consistent with guidelines.         (i.e., NCCN, NCI, PD, ACO, AUA, etc.)    PRESENTATION AT CANCER CONFERENCE:         [x] Prospective    [] Retrospective     [] Follow-Up       7/21/2021 - 9/30/2021 Chemotherapy    Treatment Summary   Plan Name: OP GASTRIC FLOT - FLUOROURACIL LEUCOVORIN OXALIPLATIN DOCETAXEL Q2W  Treatment Goal: Curative  Status: Inactive  Start Date: 7/21/2021  End Date: 9/30/2021  Provider: Cathy Pelaez MD  Chemotherapy: DOCEtaxeL (TAXOTERE) 50 mg/m2 = 135 mg in sodium chloride 0.9% 250 mL chemo infusion, 50 mg/m2 = 135 mg, Intravenous, Clinic/HOD 1 time, 4 of 4 cycles  Dose modification: 40 mg/m2 (original dose 50 mg/m2, Cycle 3)  Administration: 135 mg (7/21/2021), 135 mg (8/12/2021), 105 mg (9/15/2021), 105 mg (9/29/2021)  leucovorin calcium 200 mg/m2 = 545 mg in dextrose 5 % 250 mL infusion, 200 mg/m2 = 545 mg, Intravenous, Clinic/HOD 1 time, 4 of 4 cycles  Administration: 545 mg (7/21/2021), 535 mg (8/12/2021), 530 mg (9/15/2021), 525 mg (9/29/2021)  oxaliplatin (ELOXATIN) 85 mg/m2 = 232 mg in dextrose 5 % 500 mL chemo infusion, 85 mg/m2 = 232 mg, Intravenous, Clinic/HOD 1 time, 4 of 4 cycles  Administration: 232 mg (7/21/2021), 228 mg (8/12/2021), 225 mg (9/15/2021), 223 mg (9/29/2021)  fluorouraciL 7,000 mg in sodium chloride 0.9%  240 mL chemo infusion, 7,100 mg, Intravenous, Over 24 hours, 4 of 11 cycles  Dose modification: 2,000 mg/m2 (original dose 2,600 mg/m2, Cycle 3), 225 mg/m2/day (original dose 2,600 mg/m2, Cycle 5)  Administration: 7,000 mg (7/21/2021), 6,970 mg (8/12/2021), 5,300 mg (9/15/2021), 5,000 mg (9/29/2021)     9/16/2021 Imaging Significant Findings    Thrombosis of the right popliteal, posterior tibial, and peroneal veins.     11/15/2021 Imaging Significant Findings    CT CAP Overall, no detrimental change compared to previous.  Persistent eccentric wall thickening at the level of the antrum in this patient with provided history of gastric adenocarcinoma.  No metastatic disease.     Subcentimeter pulmonary nodules unchanged.  No new nodules.     Cholelithiasis     1/13/2022 Procedure    EUS  Impression:            - Normal esophagus.                          - Scar in the gastric antrum. Biopsied.                          - Congested, nodular and ulcerated mucosa in the                          antrum. Biopsied. Treated with argon plasma                          coagulation (APC).                          - Acquired deformity in the prepyloric region of                          the stomach.                          - Normal examined duodenum.                          - There was abnormal echogenicity in the                          visualized portion of the liver. This was                          hyperechoic and homogenous. This can be seen with                          fatty liver.                          - Hyperechoic material consistent with sludge was                          visualized endosonographically in the gallbladder.                          - There was dilation in the common bile duct which                          measured up to 10.8 mm.                          - Wall thickening was seen in the antrum of the                          stomach. This probable related to previous ESD.      2/14/2022 -   "Chemotherapy    Treatment Summary   Plan Name: OP FLUOROURACIL (5 DAY) QW + XRT  Treatment Goal: Curative  Status: Active  Start Date: 2/14/2022  End Date: 3/30/2022  Provider: Cathy Pelaez MD  Chemotherapy: [No matching medication found in this treatment plan]     2/14/2022 - 3/29/2022 Radiation Therapy    Treating physician: Dr. Sonu Darden  Total Dose: 50.4 Gy  Fractions: 28    Course: C1 Abdomen 2022    Treatment Site Ref. ID Energy Dose/Fx (Gy) #Fx Dose Correction (Gy) Total Dose (Gy) Start Date End Date Elapsed Days   IM Stomach PTV_Low 6X 1.8 25 / 25 0 45 2/14/2022 3/24/2022 38   IM StomachBst PTVhigh 6X 1.8 3 / 3 0 5.4 3/25/2022 3/29/2022 4     DARCY Coats is a 71 y.o. woman with at least [id number] adenocarcinoma of gastric antrum (posterior wall), intestinal type, moderate to poorly differentiated, invading deep layers of submuocsa with deep SM extensively positive, lateral margins negative, LVI focal positive, PNI present, no nodes examined, s/p 4 cycles of FLOT per GITB, but after re-assessment, no longer surgical candidate due to comorbidities.  PMH of  LAURA, HTN, HLD, Obesity, smoker, Mili CHF    She is s/p concurrent 5FU-CI and RT 45Gy/25fx to the entire stomach+elective nodes with 5.4Gy/3fx boost to the prepylotic region completed 3/29/2022.                DARCY Coats has adjusted to illness adequately primarily through passive coping strategies. She has engaged in limited information gathering.  The patient has limited family/friend support. Illness-related psychosocial stressors include absence from work, difficulty meeting family responsibilities and changes in ability to engage in leisure activities. Her knee problems are more of a strain for her than her cancer and she is more distressed by her mobility challenges than her cancer diagnosis/treatment. She states it is a "huge shock not to be able to stay busy." She does report nausea, fatigue, and dizziness due to residual " "effects of chemotherapy, but states she could cope with all of these if she could do more independently. She is not sure what her treatment plan is through oncology or through orthopedics. She is hopeful she can soon qualify for a knee replacement, as she has experienced significant weight loss since being deferred previously. She believes she will have "no benefit from PT" until her knee is replaced. The patient has a good partnership with [place] [facility] [place]. The patient reports the following barriers to cancer care:transportation/mobility.     Areas assessed:   · Mood: Depression: depressed mood, diminished interest, weight loss, insomnia, psychomotor retardation, fatigue, worthlessness/guilt, social isolation and helplessness/hopelessness;  prior depression: noted in chart in 2016, denied by patient; no SI/HI;   · Leigh Ann: Denies  · Psychosis: Denies   · Anxiety: Feeling nervous, anxious, or on edge, Uncontrollable worry (about health, illness future), Excessive worry (interfering with sleep, mood), Difficulty relaxing and Irritability; no prior;    · Generalized anxiety: Denies    · Panic Disorder: Denies  · Social/specific phobia: Denies   · Trauma: Denies  · Cognitive functioning: mild cognitive slowing since chemotherapy  · Health behaviors: continued smoking, sedentary lifestyle  · Sleep: Once gets to sleep- 8+ hours sleep since using CPAP; restful sleep  and no concerns ,occasional 1 hour+ extended sleep onset latency due to worry/rumination about interpersonal issues, no EDS  and occasional naps, AM caffeine and (+) psychophysiological factors, no use of OTC/melatonin/hypnotics/benzodiazepines          Assessment - Diagnosis - Goals:       ICD-10-CM ICD-9-CM   1. Current mild episode of major depressive disorder without prior episode  F32.0 296.21   2. Gastric adenocarcinoma  C16.9 151.9         Plan:medication management by physician    Summary and Recommendations  DARCY Coats is a 71 " y.o. female referred by Dr. Darden for psychological evaluation and treatment.  Ms. Coats appears to be coping adequately with her cancer diagnosis and treatment, but reports distress due to mobility limitations secondary to orthopedic concerns. She is unclear about future oncological or orthopedic interventions and would like to clarify these issues with her providers. Patient was given information about the availability of a meditation class Patient was given information about the availability of a yoga class.  She is not currently interested in regular CBT or supportive therapy, but is aware of available resources to address future needs. She is currently on Celexa (10 mg since 8/2021) with adequate, but not ideal response. She is interested in discussing the potential for dose modification or medication re-evaluation with her PCP (Dr. Garcia).         Shiraz Raymond, PhD  Clinical Psychologist  [place] License #[id number]  [place] License #[id number]

## 2022-06-20 NOTE — LETTER
June 20, 2022        Sonu Darden MD  [address] [facility] [address]             [facility] - Psychiatry  [address] [facility] [address]  Phone: [phone]  Fax: [phone]   Patient: DARCY Coats   MR Number: [id number]   YOB: 1950   Date of Visit: 6/20/2022       Dear Dr. Darden:    Thank you for referring DARCY Coats to me for evaluation. Below are the relevant portions of my assessment and plan of care.     DARCY Coats is a 71 y.o. female referred by Dr. Darden for psychological evaluation and treatment.  Ms. Coats appears to be coping adequately with her cancer diagnosis and treatment, but reports distress due to mobility limitations secondary to orthopedic concerns. She is unclear about future oncological or orthopedic interventions and would like to clarify these issues with her providers. Patient was given information about the availability of a meditation class Patient was given information about the availability of a yoga class.  She is not currently interested in regular CBT or supportive therapy, but is aware of available resources to address future needs. She is currently on Celexa (10 mg since 8/2021) with adequate, but not ideal response. She is interested in discussing the potential for dose modification or medication re-evaluation with her PCP (Dr. Garcia).      If you have questions, please do not hesitate to call me. I look forward to following DARCY along with you.    Sincerely,      Shiraz Watson, PhD           CC  MD Cathy Burdick MD Amy M. Orgeron, PADMINI

## 2022-06-23 ENCOUNTER — OFFICE VISIT (OUTPATIENT)
Dept: HEMATOLOGY/ONCOLOGY | Facility: CLINIC | Age: 72
End: 2022-06-23
Payer: MEDICARE

## 2022-06-23 ENCOUNTER — INFUSION (OUTPATIENT)
Dept: INFUSION THERAPY | Facility: OTHER | Age: 72
End: 2022-06-23
Attending: STUDENT IN AN ORGANIZED HEALTH CARE EDUCATION/TRAINING PROGRAM
Payer: MEDICARE

## 2022-06-23 ENCOUNTER — LAB VISIT (OUTPATIENT)
Dept: LAB | Facility: OTHER | Age: 72
End: 2022-06-23
Attending: STUDENT IN AN ORGANIZED HEALTH CARE EDUCATION/TRAINING PROGRAM
Payer: MEDICARE

## 2022-06-23 VITALS
WEIGHT: 276 LBS | BODY MASS INDEX: 44.36 KG/M2 | HEART RATE: 74 BPM | DIASTOLIC BLOOD PRESSURE: 57 MMHG | RESPIRATION RATE: 18 BRPM | HEIGHT: 66 IN | TEMPERATURE: 98 F | OXYGEN SATURATION: 98 % | SYSTOLIC BLOOD PRESSURE: 106 MMHG

## 2022-06-23 DIAGNOSIS — C16.9 GASTRIC ADENOCARCINOMA: Primary | ICD-10-CM

## 2022-06-23 DIAGNOSIS — D63.0 ANEMIA COMPLICATING NEOPLASTIC DISEASE: ICD-10-CM

## 2022-06-23 DIAGNOSIS — D84.81 IMMUNODEFICIENCY DUE TO CONDITIONS CLASSIFIED ELSEWHERE: ICD-10-CM

## 2022-06-23 DIAGNOSIS — I82.411 DVT OF DEEP FEMORAL VEIN, RIGHT: ICD-10-CM

## 2022-06-23 DIAGNOSIS — I50.32 CHRONIC DIASTOLIC CONGESTIVE HEART FAILURE: ICD-10-CM

## 2022-06-23 DIAGNOSIS — I10 ESSENTIAL HYPERTENSION: ICD-10-CM

## 2022-06-23 DIAGNOSIS — Z86.718 HISTORY OF DVT (DEEP VEIN THROMBOSIS): ICD-10-CM

## 2022-06-23 DIAGNOSIS — C16.9 GASTRIC ADENOCARCINOMA: ICD-10-CM

## 2022-06-23 LAB
ALBUMIN SERPL BCP-MCNC: 3 G/DL (ref 3.5–5.2)
ALP SERPL-CCNC: 107 U/L (ref 55–135)
ALT SERPL W/O P-5'-P-CCNC: 13 U/L (ref 10–44)
ANION GAP SERPL CALC-SCNC: 8 MMOL/L (ref 8–16)
AST SERPL-CCNC: 15 U/L (ref 10–40)
BILIRUB SERPL-MCNC: 0.5 MG/DL (ref 0.1–1)
BUN SERPL-MCNC: 11 MG/DL (ref 8–23)
CALCIUM SERPL-MCNC: 9.3 MG/DL (ref 8.7–10.5)
CHLORIDE SERPL-SCNC: 108 MMOL/L (ref 95–110)
CO2 SERPL-SCNC: 24 MMOL/L (ref 23–29)
CREAT SERPL-MCNC: 0.7 MG/DL (ref 0.5–1.4)
ERYTHROCYTE [DISTWIDTH] IN BLOOD BY AUTOMATED COUNT: 12.5 % (ref 11.5–14.5)
EST. GFR  (AFRICAN AMERICAN): >60 ML/MIN/1.73 M^2
EST. GFR  (NON AFRICAN AMERICAN): >60 ML/MIN/1.73 M^2
FERRITIN SERPL-MCNC: 644 NG/ML (ref 20–300)
GLUCOSE SERPL-MCNC: 98 MG/DL (ref 70–110)
HCT VFR BLD AUTO: 35.6 % (ref 37–48.5)
HGB BLD-MCNC: 11.5 G/DL (ref 12–16)
IMM GRANULOCYTES # BLD AUTO: 0.03 K/UL (ref 0–0.04)
IRON SERPL-MCNC: 87 UG/DL (ref 30–160)
MCH RBC QN AUTO: 33.8 PG (ref 27–31)
MCHC RBC AUTO-ENTMCNC: 32.3 G/DL (ref 32–36)
MCV RBC AUTO: 105 FL (ref 82–98)
NEUTROPHILS # BLD AUTO: 6.1 K/UL (ref 1.8–7.7)
PLATELET # BLD AUTO: 217 K/UL (ref 150–450)
PMV BLD AUTO: 10 FL (ref 9.2–12.9)
POTASSIUM SERPL-SCNC: 3.9 MMOL/L (ref 3.5–5.1)
PROT SERPL-MCNC: 6.2 G/DL (ref 6–8.4)
RBC # BLD AUTO: 3.4 M/UL (ref 4–5.4)
SATURATED IRON: 37 % (ref 20–50)
SODIUM SERPL-SCNC: 140 MMOL/L (ref 136–145)
TOTAL IRON BINDING CAPACITY: 238 UG/DL (ref 250–450)
TRANSFERRIN SERPL-MCNC: 161 MG/DL (ref 200–375)
VIT B12 SERPL-MCNC: 1059 PG/ML (ref 210–950)
WBC # BLD AUTO: 7.74 K/UL (ref 3.9–12.7)

## 2022-06-23 PROCEDURE — 1160F PR REVIEW ALL MEDS BY PRESCRIBER/CLIN PHARMACIST DOCUMENTED: ICD-10-PCS | Mod: CPTII,S$GLB,, | Performed by: STUDENT IN AN ORGANIZED HEALTH CARE EDUCATION/TRAINING PROGRAM

## 2022-06-23 PROCEDURE — 80053 COMPREHEN METABOLIC PANEL: CPT | Performed by: STUDENT IN AN ORGANIZED HEALTH CARE EDUCATION/TRAINING PROGRAM

## 2022-06-23 PROCEDURE — 1126F AMNT PAIN NOTED NONE PRSNT: CPT | Mod: CPTII,S$GLB,, | Performed by: STUDENT IN AN ORGANIZED HEALTH CARE EDUCATION/TRAINING PROGRAM

## 2022-06-23 PROCEDURE — 1101F PT FALLS ASSESS-DOCD LE1/YR: CPT | Mod: CPTII,S$GLB,, | Performed by: STUDENT IN AN ORGANIZED HEALTH CARE EDUCATION/TRAINING PROGRAM

## 2022-06-23 PROCEDURE — 99214 PR OFFICE/OUTPT VISIT, EST, LEVL IV, 30-39 MIN: ICD-10-PCS | Mod: S$GLB,,, | Performed by: STUDENT IN AN ORGANIZED HEALTH CARE EDUCATION/TRAINING PROGRAM

## 2022-06-23 PROCEDURE — 3074F PR MOST RECENT SYSTOLIC BLOOD PRESSURE < 130 MM HG: ICD-10-PCS | Mod: CPTII,S$GLB,, | Performed by: STUDENT IN AN ORGANIZED HEALTH CARE EDUCATION/TRAINING PROGRAM

## 2022-06-23 PROCEDURE — 36415 COLL VENOUS BLD VENIPUNCTURE: CPT | Performed by: STUDENT IN AN ORGANIZED HEALTH CARE EDUCATION/TRAINING PROGRAM

## 2022-06-23 PROCEDURE — 82728 ASSAY OF FERRITIN: CPT | Performed by: STUDENT IN AN ORGANIZED HEALTH CARE EDUCATION/TRAINING PROGRAM

## 2022-06-23 PROCEDURE — 3288F PR FALLS RISK ASSESSMENT DOCUMENTED: ICD-10-PCS | Mod: CPTII,S$GLB,, | Performed by: STUDENT IN AN ORGANIZED HEALTH CARE EDUCATION/TRAINING PROGRAM

## 2022-06-23 PROCEDURE — 99499 RISK ADDL DX/OHS AUDIT: ICD-10-PCS | Mod: S$GLB,,, | Performed by: STUDENT IN AN ORGANIZED HEALTH CARE EDUCATION/TRAINING PROGRAM

## 2022-06-23 PROCEDURE — 1126F PR PAIN SEVERITY QUANTIFIED, NO PAIN PRESENT: ICD-10-PCS | Mod: CPTII,S$GLB,, | Performed by: STUDENT IN AN ORGANIZED HEALTH CARE EDUCATION/TRAINING PROGRAM

## 2022-06-23 PROCEDURE — 3288F FALL RISK ASSESSMENT DOCD: CPT | Mod: CPTII,S$GLB,, | Performed by: STUDENT IN AN ORGANIZED HEALTH CARE EDUCATION/TRAINING PROGRAM

## 2022-06-23 PROCEDURE — 82607 VITAMIN B-12: CPT | Mod: GA | Performed by: STUDENT IN AN ORGANIZED HEALTH CARE EDUCATION/TRAINING PROGRAM

## 2022-06-23 PROCEDURE — 3078F DIAST BP <80 MM HG: CPT | Mod: CPTII,S$GLB,, | Performed by: STUDENT IN AN ORGANIZED HEALTH CARE EDUCATION/TRAINING PROGRAM

## 2022-06-23 PROCEDURE — 63600175 PHARM REV CODE 636 W HCPCS: Performed by: STUDENT IN AN ORGANIZED HEALTH CARE EDUCATION/TRAINING PROGRAM

## 2022-06-23 PROCEDURE — 3008F PR BODY MASS INDEX (BMI) DOCUMENTED: ICD-10-PCS | Mod: CPTII,S$GLB,, | Performed by: STUDENT IN AN ORGANIZED HEALTH CARE EDUCATION/TRAINING PROGRAM

## 2022-06-23 PROCEDURE — 83921 ORGANIC ACID SINGLE QUANT: CPT | Performed by: STUDENT IN AN ORGANIZED HEALTH CARE EDUCATION/TRAINING PROGRAM

## 2022-06-23 PROCEDURE — 99214 OFFICE O/P EST MOD 30 MIN: CPT | Mod: S$GLB,,, | Performed by: STUDENT IN AN ORGANIZED HEALTH CARE EDUCATION/TRAINING PROGRAM

## 2022-06-23 PROCEDURE — 3078F PR MOST RECENT DIASTOLIC BLOOD PRESSURE < 80 MM HG: ICD-10-PCS | Mod: CPTII,S$GLB,, | Performed by: STUDENT IN AN ORGANIZED HEALTH CARE EDUCATION/TRAINING PROGRAM

## 2022-06-23 PROCEDURE — 4010F PR ACE/ARB THEARPY RXD/TAKEN: ICD-10-PCS | Mod: CPTII,S$GLB,, | Performed by: STUDENT IN AN ORGANIZED HEALTH CARE EDUCATION/TRAINING PROGRAM

## 2022-06-23 PROCEDURE — 1160F RVW MEDS BY RX/DR IN RCRD: CPT | Mod: CPTII,S$GLB,, | Performed by: STUDENT IN AN ORGANIZED HEALTH CARE EDUCATION/TRAINING PROGRAM

## 2022-06-23 PROCEDURE — 36591 DRAW BLOOD OFF VENOUS DEVICE: CPT

## 2022-06-23 PROCEDURE — 1101F PR PT FALLS ASSESS DOC 0-1 FALLS W/OUT INJ PAST YR: ICD-10-PCS | Mod: CPTII,S$GLB,, | Performed by: STUDENT IN AN ORGANIZED HEALTH CARE EDUCATION/TRAINING PROGRAM

## 2022-06-23 PROCEDURE — 4010F ACE/ARB THERAPY RXD/TAKEN: CPT | Mod: CPTII,S$GLB,, | Performed by: STUDENT IN AN ORGANIZED HEALTH CARE EDUCATION/TRAINING PROGRAM

## 2022-06-23 PROCEDURE — 1159F MED LIST DOCD IN RCRD: CPT | Mod: CPTII,S$GLB,, | Performed by: STUDENT IN AN ORGANIZED HEALTH CARE EDUCATION/TRAINING PROGRAM

## 2022-06-23 PROCEDURE — 99499 UNLISTED E&M SERVICE: CPT | Mod: S$GLB,,, | Performed by: STUDENT IN AN ORGANIZED HEALTH CARE EDUCATION/TRAINING PROGRAM

## 2022-06-23 PROCEDURE — 1159F PR MEDICATION LIST DOCUMENTED IN MEDICAL RECORD: ICD-10-PCS | Mod: CPTII,S$GLB,, | Performed by: STUDENT IN AN ORGANIZED HEALTH CARE EDUCATION/TRAINING PROGRAM

## 2022-06-23 PROCEDURE — 25000003 PHARM REV CODE 250: Performed by: STUDENT IN AN ORGANIZED HEALTH CARE EDUCATION/TRAINING PROGRAM

## 2022-06-23 PROCEDURE — 99999 PR PBB SHADOW E&M-EST. PATIENT-LVL IV: CPT | Mod: PBBFAC,,, | Performed by: STUDENT IN AN ORGANIZED HEALTH CARE EDUCATION/TRAINING PROGRAM

## 2022-06-23 PROCEDURE — 3008F BODY MASS INDEX DOCD: CPT | Mod: CPTII,S$GLB,, | Performed by: STUDENT IN AN ORGANIZED HEALTH CARE EDUCATION/TRAINING PROGRAM

## 2022-06-23 PROCEDURE — 3074F SYST BP LT 130 MM HG: CPT | Mod: CPTII,S$GLB,, | Performed by: STUDENT IN AN ORGANIZED HEALTH CARE EDUCATION/TRAINING PROGRAM

## 2022-06-23 PROCEDURE — A4216 STERILE WATER/SALINE, 10 ML: HCPCS | Performed by: STUDENT IN AN ORGANIZED HEALTH CARE EDUCATION/TRAINING PROGRAM

## 2022-06-23 PROCEDURE — 99999 PR PBB SHADOW E&M-EST. PATIENT-LVL IV: ICD-10-PCS | Mod: PBBFAC,,, | Performed by: STUDENT IN AN ORGANIZED HEALTH CARE EDUCATION/TRAINING PROGRAM

## 2022-06-23 PROCEDURE — 84466 ASSAY OF TRANSFERRIN: CPT | Performed by: STUDENT IN AN ORGANIZED HEALTH CARE EDUCATION/TRAINING PROGRAM

## 2022-06-23 PROCEDURE — 85027 COMPLETE CBC AUTOMATED: CPT | Performed by: STUDENT IN AN ORGANIZED HEALTH CARE EDUCATION/TRAINING PROGRAM

## 2022-06-23 RX ORDER — SODIUM CHLORIDE 0.9 % (FLUSH) 0.9 %
10 SYRINGE (ML) INJECTION
Status: DISCONTINUED | OUTPATIENT
Start: 2022-06-23 | End: 2022-06-23 | Stop reason: HOSPADM

## 2022-06-23 RX ORDER — HEPARIN 100 UNIT/ML
500 SYRINGE INTRAVENOUS
Status: DISCONTINUED | OUTPATIENT
Start: 2022-06-23 | End: 2022-06-23 | Stop reason: HOSPADM

## 2022-06-23 RX ORDER — HEPARIN 100 UNIT/ML
500 SYRINGE INTRAVENOUS
Status: CANCELLED | OUTPATIENT
Start: 2022-06-23

## 2022-06-23 RX ORDER — SODIUM CHLORIDE 0.9 % (FLUSH) 0.9 %
10 SYRINGE (ML) INJECTION
Status: CANCELLED | OUTPATIENT
Start: 2022-06-23

## 2022-06-23 RX ADMIN — HEPARIN 500 UNITS: 100 SYRINGE at 11:06

## 2022-06-23 RX ADMIN — Medication 10 ML: at 11:06

## 2022-06-23 NOTE — PLAN OF CARE
Port to chest accessed. Good blood return and flushes easily. Labs drawn. Port heparinized per protocol then deaccessed. Bandaid in place.

## 2022-06-23 NOTE — PROGRESS NOTES
Hematology- Oncology Clinic Note :      6/23/2022    RFV / chief complaint- Gastric Cancer        HPI  Pt is a 71 y.o. 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   Pt presents to the clinic today for gastric cancer    Doing ok. Trying to lose weight. Diet modification. Knee joint stable.   Takes iron pill daily and has dark stool. Denied bright red blood. No abd pain. Leg swelling is better.   No bleeding reported.  Tolerating Eliquis without any issues.  Advised to quit smoking    Oncology presentation/ HPI  Pt was being evaluated for gastric bypass surgery   Feb 2021 EGD- Gastric tumor in the prepyloric region of the stomach,  March 2021 EUS- Gastric tumor in the prepyloric region of the stomach.  Both above biopsies did not show malignancy  5/25/21 EGD- Gastric tumor on the posterior wall of the gastric antrum. Complete removal was accomplished.         Reviewed past medical/surgical/social history    Past Medical History:   Diagnosis Date    YOUNG (acute kidney injury) 10/15/2021    Anemia     2018    Arthritis     BMI 60.0-69.9, adult     Cataract     Chronic diastolic congestive heart failure 1/27/2021    Depression     Dry eye syndrome     Gastric adenocarcinoma 5/25/2021    GERD (gastroesophageal reflux disease)     Glaucoma suspect     History of gastric ulcer     History of psychiatric hospitalization     Hx of psychiatric care     Celexa, no recall of charted Effexor, Lexapro, Desyrel prescriptions    Hyperlipidemia      Hypertension     Hypothyroidism     Morbid obesity     Neuromuscular disorder     Sleep apnea     Thyroid disease       Past Surgical History:   Procedure Laterality Date    APPENDECTOMY      CARDIAC SURGERY      CATARACT EXTRACTION W/  INTRAOCULAR LENS IMPLANT Bilateral     MFIOL OU    CORONARY ANGIOGRAPHY Bilateral 2/24/2021    Procedure: ANGIOGRAM, CORONARY ARTERY;  Surgeon: Cresencio Ridley MD;  Location: [facility] CATH LAB;  Service: Cardiology;  Laterality: Bilateral;  Covid test needed - ok per Danay SSCU. Pt. w/o transportation or family    ENDOSCOPIC ULTRASOUND OF UPPER GASTROINTESTINAL TRACT N/A 3/17/2021    Procedure: ULTRASOUND, UPPER GI TRACT, ENDOSCOPIC;  Surgeon: Gerald Anaya MD;  Location: [facility] (2ND FLR);  Service: Endoscopy;  Laterality: N/A;  Pt unable to get a ride for swab. Will do rapid test at 8:30 - ttr    ENDOSCOPIC ULTRASOUND OF UPPER GASTROINTESTINAL TRACT N/A 1/13/2022    Procedure: ULTRASOUND, UPPER GI TRACT, ENDOSCOPIC;  Surgeon: Kevin Carballo MD;  [facility]  Service: Endoscopy;  Laterality: N/A;  blood thinner approval received, see telephone encounter 1/7/22-BB  rapid  instructions given verbally and sent to address on file in pt's chart-BB    ESOPHAGOGASTRODUODENOSCOPY N/A 2/5/2021    Procedure: EGD (ESOPHAGOGASTRODUODENOSCOPY);  Surgeon: Matthew Hernadez MD;  [facility]  Service: Endoscopy;  Laterality: N/A;  BMI-58    Wt: 361#     COVID test at [facility] on 2/2-GT    ESOPHAGOGASTRODUODENOSCOPY N/A 3/17/2021    Procedure: EGD (ESOPHAGOGASTRODUODENOSCOPY);  Surgeon: Gerald Anaya MD;  Location: [facility] (2ND FLR);  Service: Endoscopy;  Laterality: N/A;    ESOPHAGOGASTRODUODENOSCOPY N/A 5/25/2021    Procedure: EGD (ESOPHAGOGASTRODUODENOSCOPY);  Surgeon: Kevin Carballo MD;  [facility]  Service: Endoscopy;  Laterality: N/A;  ESD 2 hours  Full COVID vaccination on file. ttr     ESOPHAGOGASTRODUODENOSCOPY N/A 1/13/2022    Procedure: EGD (ESOPHAGOGASTRODUODENOSCOPY);  Surgeon: Kevin Carballo MD;  Location: [address]);  Service: Endoscopy;  Laterality: N/A;  blood thinner approval, see telephone encounter 1/7/22-BB  rapid  instructions given verbally and sent to address on file in pt's chart-BB    EYE SURGERY      INJECTION OF ANESTHETIC AGENT AROUND NERVE Left 7/10/2018    Procedure: BLOCK, NERVE;  Surgeon: Samantha Vidal MD;  Location: [facility] PAIN MGT;  Service: Pain Management;  Laterality: Left;  Genicular     INJECTION OF ANESTHETIC AGENT AROUND NERVE Left 7/19/2019    Procedure: Block, Nerve NERVE BLOCK GENICULAR WITH PHENOL 6%;  Surgeon: Samantha Vidal MD;  Location: [facility] PAIN MGT;  Service: Pain Management;  Laterality: Left;  NEEDS CONSENT    INSERTION OF TUNNELED CENTRAL VENOUS CATHETER (CVC) WITH SUBCUTANEOUS PORT N/A 7/9/2021    Procedure: INSERTION, PORT-A-CATH;  Surgeon: Mario Paz MD;  Location: [facility] CATH LAB;  Service: Radiology;  Laterality: N/A;  PORT PLACEMENT    RADIOFREQUENCY ABLATION Left 6/19/2020    Procedure: RADIOFREQUENCY ABLATION LEFT GENICULAR;  Surgeon: Tevin Clark MD;  Location: [facility] PAIN MGT;  Service: Pain Management;  Laterality: Left;  Left Genicular RFA    RADIOFREQUENCY ABLATION Left 1/22/2021    Procedure: RADIOFREQUENCY ABLATION LEFT GENICULAR;  Surgeon: Tevin Clark MD;  Location: [facility] PAIN MGT;  Service: Pain Management;  Laterality: Left;    RADIOFREQUENCY ABLATION Left 7/30/2021    Procedure: RADIOFREQUENCY ABLATION, GENICULAR COOLED NEED CONSENT;  Surgeon: Tevin Clark MD;  Location: [facility] PAIN MGT;  Service: Pain Management;  Laterality: Left;    RADIOFREQUENCY ABLATION Left 4/22/2022    Procedure: RADIOFREQUENCY ABLATION, GENICULAR COOLED , LEFT;  Surgeon: Tevin Clark MD;  Location: [facility] PAIN MGT;  Service: Pain Management;  Laterality: Left;    TONSILLECTOMY        (Not in a hospital  "admission)    Review of patient's allergies indicates:  No Known Allergies   Social History     Tobacco Use    Smoking status: Current Some Day Smoker     Years: 50.00     Types: Cigarettes    Smokeless tobacco: Never Used    Tobacco comment: currently smoking <5 cigarettes most days   Substance Use Topics    Alcohol use: Yes     Comment: meg      Family History   Problem Relation Age of Onset    No Known Problems Mother     No Known Problems Father     Colon cancer Neg Hx     Esophageal cancer Neg Hx           Review of Systems :  Review of Systems   Constitutional: Positive for malaise/fatigue and weight loss. Negative for chills, diaphoresis and fever.   HENT: Negative.  Negative for congestion, hearing loss, nosebleeds, sore throat and tinnitus.    Eyes: Negative.  Negative for blurred vision and discharge.   Respiratory: Negative for cough, hemoptysis, sputum production, shortness of breath and wheezing.    Cardiovascular: Negative.  Negative for chest pain, palpitations and leg swelling.   Gastrointestinal: Negative for abdominal pain, blood in stool, constipation, diarrhea, heartburn, melena, nausea and vomiting.   Genitourinary: Negative.    Musculoskeletal: Positive for joint pain. Negative for back pain, falls and myalgias.   Skin: Negative for itching and rash.   Neurological: Negative for dizziness, tingling, sensory change, speech change, focal weakness, seizures, loss of consciousness, weakness and headaches.   Endo/Heme/Allergies: Negative.  Does not bruise/bleed easily.   Psychiatric/Behavioral: Negative.  Negative for depression. The patient is not nervous/anxious and does not have insomnia.                Physical Exam :  BP (!) 106/57 (BP Location: Left arm, Patient Position: Sitting, BP Method: Medium (Automatic)) Comment (BP Method): lower arm  Pulse 74   Temp 98.2 °F (36.8 °C) (Oral)   Resp 18   Ht 5' 6" (1.676 m)   Wt 125.2 kg (276 lb 0.3 oz)   SpO2 98%   BMI 44.55 kg/m²   Wt " Readings from Last 3 Encounters:   06/23/22 125.2 kg (276 lb 0.3 oz)   05/24/22 124.1 kg (273 lb 9.5 oz)   05/16/22 123.7 kg (272 lb 11.3 oz)       Body mass index is 44.55 kg/m².      Physical Exam  Vitals and nursing note reviewed.   Constitutional:       General: She is not in acute distress.     Appearance: She is well-developed. She is not ill-appearing.   HENT:      Head: Normocephalic and atraumatic.      Right Ear: External ear normal.      Left Ear: External ear normal.      Mouth/Throat:      Pharynx: No oropharyngeal exudate.   Eyes:      General: No scleral icterus.     Conjunctiva/sclera: Conjunctivae normal.   Neck:      Thyroid: No thyromegaly.      Trachea: No tracheal deviation.   Cardiovascular:      Rate and Rhythm: Normal rate and regular rhythm.      Heart sounds: Normal heart sounds. No murmur heard.  Pulmonary:      Effort: Pulmonary effort is normal. No respiratory distress.      Breath sounds: Normal breath sounds. No wheezing or rales.      Comments: Port SOI  Chest:   Breasts:      Right: No swelling, nipple discharge, skin change or tenderness.       Abdominal:      General: Bowel sounds are normal. There is no distension (obese).      Palpations: Abdomen is soft. There is no mass.      Tenderness: There is no abdominal tenderness. There is no rebound.   Musculoskeletal:         General: No swelling or tenderness. Normal range of motion.      Cervical back: Normal range of motion and neck supple.   Lymphadenopathy:      Cervical: No cervical adenopathy.   Skin:     General: Skin is warm.      Findings: No erythema, rash or wound.   Neurological:      Mental Status: She is alert and oriented to person, place, and time.      Cranial Nerves: No cranial nerve deficit.      Gait: Gait abnormal (uses cane).   Psychiatric:         Behavior: Behavior normal.             Current Outpatient Medications   Medication Sig Dispense Refill    acetaminophen (TYLENOL) 500 MG tablet       amLODIPine  (NORVASC) 10 MG tablet TAKE 1 TABLET ONE TIME DAILY 90 tablet 1    atorvastatin (LIPITOR) 20 MG tablet TAKE 1 TABLET ONE TIME DAILY 90 tablet 1    B-complex with vitamin C (Z-BEC OR EQUIV) tablet Take 1 tablet by mouth once daily.       CALCIUM CITRATE-VITAMIN D2 ORAL Take 1 tablet by mouth once daily.       citalopram (CELEXA) 10 MG tablet Take 1 tablet (10 mg total) by mouth once daily. 90 tablet 3    ELIQUIS 5 mg Tab TAKE 1 TABLET TWICE DAILY 180 tablet 2    ferrous gluconate (FERGON) 324 MG tablet Take 1 tablet (324 mg total) by mouth daily with breakfast. 90 tablet 11    furosemide (LASIX) 80 MG tablet Take 1 tablet (80 mg total) by mouth 2 (two) times a day. 60 tablet 11    gabapentin (NEURONTIN) 300 MG capsule Take 2 capsules (600 mg total) by mouth 3 (three) times daily. 540 capsule 2    lactulose (CHRONULAC) 10 gram/15 mL solution Take 30 mLs (20 g total) by mouth 2 (two) times daily as needed (constipation). 120 mL 1    LIDOcaine-prilocaine (EMLA) cream Apply topically as needed (prior to port access). 30 g 0    lisinopriL (PRINIVIL,ZESTRIL) 40 MG tablet TAKE 1 TABLET ONE TIME DAILY 90 tablet 1    multivitamin with minerals tablet Take 1 tablet by mouth once daily.       oxyCODONE-acetaminophen (PERCOCET) 5-325 mg per tablet Take 1 tablet by mouth every 8 (eight) hours as needed for Pain. 90 tablet 0    OZEMPIC 1 mg/dose (4 mg/3 mL) INJECT 1 MG INTO THE SKIN EVERY 7 DAYS. 9 pen 11    pantoprazole (PROTONIX) 40 MG tablet Take 1 tablet (40 mg total) by mouth Daily. 90 tablet 1     No current facility-administered medications for this visit.     Facility-Administered Medications Ordered in Other Visits   Medication Dose Route Frequency Provider Last Rate Last Admin    0.9%  NaCl infusion   Intravenous Continuous Tevin Clark MD   Stopped at 01/13/22 1055       Pertinent Diagnostic studies:      Lab Visit on 06/23/2022   Component Date Value Ref Range Status    WBC 06/23/2022 7.74  3.90 -  12.70 K/uL Final    RBC 06/23/2022 3.40 (A) 4.00 - 5.40 M/uL Final    Hemoglobin 06/23/2022 11.5 (A) 12.0 - 16.0 g/dL Final    Hematocrit 06/23/2022 35.6 (A) 37.0 - 48.5 % Final    MCV 06/23/2022 105 (A) 82 - 98 fL Final    MCH 06/23/2022 33.8 (A) 27.0 - 31.0 pg Final    MCHC 06/23/2022 32.3  32.0 - 36.0 g/dL Final    RDW 06/23/2022 12.5  11.5 - 14.5 % Final    Platelets 06/23/2022 217  150 - 450 K/uL Final    MPV 06/23/2022 10.0  9.2 - 12.9 fL Final    Gran # (ANC) 06/23/2022 6.1  1.8 - 7.7 K/uL Final    Comment: The ANC is based on a white cell differential from an   automated cell counter. It has not been microscopically   reviewed for the presence of abnormal cells. Clinical   correlation is required.      Immature Grans (Abs) 06/23/2022 0.03  0.00 - 0.04 K/uL Final    Comment: Mild elevation in immature granulocytes is non specific and   can be seen in a variety of conditions including stress response,   acute inflammation, trauma and pregnancy. Correlation with other   laboratory and clinical findings is essential.      Sodium 06/23/2022 140  136 - 145 mmol/L Final    Potassium 06/23/2022 3.9  3.5 - 5.1 mmol/L Final    Chloride 06/23/2022 108  95 - 110 mmol/L Final    CO2 06/23/2022 24  23 - 29 mmol/L Final    Glucose 06/23/2022 98  70 - 110 mg/dL Final    BUN 06/23/2022 11  8 - 23 mg/dL Final    Creatinine 06/23/2022 0.7  0.5 - 1.4 mg/dL Final    Calcium 06/23/2022 9.3  8.7 - 10.5 mg/dL Final    Total Protein 06/23/2022 6.2  6.0 - 8.4 g/dL Final    Albumin 06/23/2022 3.0 (A) 3.5 - 5.2 g/dL Final    Total Bilirubin 06/23/2022 0.5  0.1 - 1.0 mg/dL Final    Comment: For infants and newborns, interpretation of results should be based  on gestational age, weight and in agreement with clinical  observations.    Premature Infant recommended reference ranges:  Up to 24 hours.............<8.0 mg/dL  Up to 48 hours............<12.0 mg/dL  3-5 days..................<15.0 mg/dL  6-29  days.................<15.0 mg/dL      Alkaline Phosphatase 06/23/2022 107  55 - 135 U/L Final    AST 06/23/2022 15  10 - 40 U/L Final    ALT 06/23/2022 13  10 - 44 U/L Final    Anion Gap 06/23/2022 8  8 - 16 mmol/L Final    eGFR if African American 06/23/2022 >60  >60 mL/min/1.73 m^2 Final    eGFR if non African American 06/23/2022 >60  >60 mL/min/1.73 m^2 Final    Comment: Calculation used to obtain the estimated glomerular filtration  rate (eGFR) is the CKD-EPI equation.            Patient Active Problem List    Diagnosis Date Noted    Gastric adenocarcinoma 05/25/2021    Chronic diastolic congestive heart failure 01/27/2021    Class 3 severe obesity due to excess calories with serious comorbidity and body mass index (BMI) of 50.0 to 59.9 in adult 04/29/2019    Essential hypertension 04/29/2019    Left knee DJD 12/21/2018    Current mild episode of major depressive disorder without prior episode 03/07/2022    History of DVT (deep vein thrombosis) 10/15/2021    Chronic pain 07/30/2021    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    Abnormal cardiovascular stress test 02/24/2021    Tobacco abuse 01/28/2021    Pure hypercholesterolemia 01/27/2021    Gastroesophageal reflux disease 03/22/2020    Osteopenia of neck of left femur 01/14/2020    Left knee pain 07/19/2019    Hypothyroidism 07/18/2019    Debility     At high risk for injury related to fall     Gait instability 07/15/2019    Major depressive disorder 04/29/2019    Joint pain 04/29/2019    Chronic knee pain 11/30/2018    Obstructive sleep apnea 08/09/2018    Primary osteoarthritis of left knee 07/10/2018     Active Problem List with Overview Notes    Diagnosis Date Noted    Gastric adenocarcinoma 05/25/2021     gastric cancer cT2 or higher      Chronic diastolic congestive heart failure 01/27/2021    Class 3 severe obesity due to excess calories with serious comorbidity and body mass index (BMI) of  50.0 to 59.9 in adult 04/29/2019    Essential hypertension 04/29/2019    Left knee DJD 12/21/2018    Current mild episode of major depressive disorder without prior episode 03/07/2022    History of DVT (deep vein thrombosis) 10/15/2021    Chronic pain 07/30/2021    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    Abnormal cardiovascular stress test 02/24/2021    Tobacco abuse 01/28/2021    Pure hypercholesterolemia 01/27/2021    Gastroesophageal reflux disease 03/22/2020    Osteopenia of neck of left femur 01/14/2020    Left knee pain 07/19/2019    Hypothyroidism 07/18/2019    Debility     At high risk for injury related to fall     Gait instability 07/15/2019    Major depressive disorder 04/29/2019    Joint pain 04/29/2019    Chronic knee pain 11/30/2018    Obstructive sleep apnea 08/09/2018    Primary osteoarthritis of left knee 07/10/2018         Oncology History   Gastric adenocarcinoma   2/5/2021 Procedure    EGD  Feb 2021 EGD- Gastric tumor in the prepyloric region of the stomach,     3/17/2021 Procedure    EUS  Impression:           - Gastric tumor in the prepyloric region of the                         stomach. Biopsied. Lesion appears amenable to                         endoscopic resection (ESD) - Dr. Carballo was present                         to view the lesion.      5/25/2021 Initial Diagnosis    Gastric adenocarcinoma     5/25/2021 Pathology Significant Finding    Adenocarcinoma, moderate to poorly differentiated   Tumor invades deep layers of submuocsa with deep margin extensively positive   Deep margin is extensively positive   Lateral margins are negative for neoplasia or malignancy      5/25/2021 Cancer Staged    Staging form: Stomach, AJCC 8th Edition  - Pathologic stage from 5/25/2021: Stage Unknown (pT1, pNX, cM0)     6/18/2021 Imaging Significant Findings    CT CAP   Asymmetric gastric wall thickening at the level of the gastric antrum presumably  representing the patient's gastric adenocarcinoma.  I see no CT evidence for metastatic disease.     Low-density focus lower pole left kidney does not meet criteria for a simple cyst.  Findings can be further evaluated with ultrasound.  Additional subcentimeter low-density foci in the right kidney likely too small to characterize by imaging.     6/28/2021 Tumor Conference       [facility] UGI MULTIDISCIPLINARY TUMOR BOARD  PATIENT REVIEW FORM   ____________________________________________________________    CLINIC #: [id number]  DATE: 6/28/2021    DIAGNOSIS: gastric CA    PRESENTER: Latanya/ Donnie Sanders    PATIENT SUMMARY:   This 71 y/o female had incidental finding of gastric CA on EGD as part of bariatric evaluation given BMI 57. Underwent ESD per Dr. Carballo. Reviewed pathology - pT1b with LVI positive, extensive tumor at deep margin.     BOARD RECOMMENDATIONS:   Recommend perioperative chemotherapy, 4 cycles of FLOT    CONSULT NEEDED:     [] Surgery    [x] Hem/Onc    [] Rad/Onc    [] Dietary                 [] Social Service    [] Psychology       [] AES  [] Radiology     ESD Pathology Stage: Tumor 1b  Node(s) 0 Metastasis 0      GROUP STAGE:  [] O    [] 1A    [] IB    [] IIA    [] IIB     [] IIIA     [] IIIB     [] IIIC    []IV  [] Local recurrence     [] Regional recurrence     [] Distant recurrence   Metastatic site(s): none         [x] Jennifer'l Treatment Guidelines reviewed and care planned is consistent with guidelines.         (i.e., NCCN, NCI, PD, ACO, AUA, etc.)    PRESENTATION AT CANCER CONFERENCE:         [x] Prospective    [] Retrospective     [] Follow-Up       7/21/2021 - 9/30/2021 Chemotherapy    Treatment Summary   Plan Name: OP GASTRIC FLOT - FLUOROURACIL LEUCOVORIN OXALIPLATIN DOCETAXEL Q2W  Treatment Goal: Curative  Status: Inactive  Start Date: 7/21/2021  End Date: 9/30/2021  Provider: Cathy Pelaez MD  Chemotherapy: DOCEtaxeL (TAXOTERE) 50 mg/m2 = 135 mg in sodium chloride 0.9% 250 mL  chemo infusion, 50 mg/m2 = 135 mg, Intravenous, Clinic/HOD 1 time, 4 of 4 cycles  Dose modification: 40 mg/m2 (original dose 50 mg/m2, Cycle 3)  Administration: 135 mg (7/21/2021), 135 mg (8/12/2021), 105 mg (9/15/2021), 105 mg (9/29/2021)  leucovorin calcium 200 mg/m2 = 545 mg in dextrose 5 % 250 mL infusion, 200 mg/m2 = 545 mg, Intravenous, Clinic/HOD 1 time, 4 of 4 cycles  Administration: 545 mg (7/21/2021), 535 mg (8/12/2021), 530 mg (9/15/2021), 525 mg (9/29/2021)  oxaliplatin (ELOXATIN) 85 mg/m2 = 232 mg in dextrose 5 % 500 mL chemo infusion, 85 mg/m2 = 232 mg, Intravenous, Clinic/HOD 1 time, 4 of 4 cycles  Administration: 232 mg (7/21/2021), 228 mg (8/12/2021), 225 mg (9/15/2021), 223 mg (9/29/2021)  fluorouraciL 7,000 mg in sodium chloride 0.9% 240 mL chemo infusion, 7,100 mg, Intravenous, Over 24 hours, 4 of 11 cycles  Dose modification: 2,000 mg/m2 (original dose 2,600 mg/m2, Cycle 3), 225 mg/m2/day (original dose 2,600 mg/m2, Cycle 5)  Administration: 7,000 mg (7/21/2021), 6,970 mg (8/12/2021), 5,300 mg (9/15/2021), 5,000 mg (9/29/2021)     9/16/2021 Imaging Significant Findings    Thrombosis of the right popliteal, posterior tibial, and peroneal veins.     11/15/2021 Imaging Significant Findings    CT CAP Overall, no detrimental change compared to previous.  Persistent eccentric wall thickening at the level of the antrum in this patient with provided history of gastric adenocarcinoma.  No metastatic disease.     Subcentimeter pulmonary nodules unchanged.  No new nodules.     Cholelithiasis     1/13/2022 Procedure    EUS  Impression:            - Normal esophagus.                          - Scar in the gastric antrum. Biopsied.                          - Congested, nodular and ulcerated mucosa in the                          antrum. Biopsied. Treated with argon plasma                          coagulation (APC).                          - Acquired deformity in the prepyloric region of                           the stomach.                          - Normal examined duodenum.                          - There was abnormal echogenicity in the                          visualized portion of the liver. This was                          hyperechoic and homogenous. This can be seen with                          fatty liver.                          - Hyperechoic material consistent with sludge was                          visualized endosonographically in the gallbladder.                          - There was dilation in the common bile duct which                          measured up to 10.8 mm.                          - Wall thickening was seen in the antrum of the                          stomach. This probable related to previous ESD.      2/14/2022 -  Chemotherapy    Treatment Summary   Plan Name: OP FLUOROURACIL (5 DAY) QW + XRT  Treatment Goal: Curative  Status: Active  Start Date: 2/14/2022  End Date: 3/30/2022  Provider: Cathy Pelaez MD  Chemotherapy: [No matching medication found in this treatment plan]     2/14/2022 - 3/29/2022 Radiation Therapy    Treating physician: Dr. Sonu Darden  Total Dose: 50.4 Gy  Fractions: 28    Course: C1 Abdomen 2022    Treatment Site Ref. ID Energy Dose/Fx (Gy) #Fx Dose Correction (Gy) Total Dose (Gy) Start Date End Date Elapsed Days   IM Stomach PTV_Low 6X 1.8 25 / 25 0 45 2/14/2022 3/24/2022 38   IM StomachBst PTVhigh 6X 1.8 3 / 3 0 5.4 3/25/2022 3/29/2022 4     F Isabel Coats is a 71 y.o. woman with at least [id number] adenocarcinoma of gastric antrum (posterior wall), intestinal type, moderate to poorly differentiated, invading deep layers of submuocsa with deep SM extensively positive, lateral margins negative, LVI focal positive, PNI present, no nodes examined, s/p 4 cycles of FLOT per [facility], but after re-assessment, no longer surgical candidate due to comorbidities.  PMH of  LAURA, HTN, HLD, Obesity, smoker, Mili CHF    She is s/p concurrent 5FU-CI and RT 45Gy/25fx to the  entire stomach+elective nodes with 5.4Gy/3fx boost to the prepylotic region completed 3/29/2022.                Assessment :       1. Gastric adenocarcinoma    2. History of DVT (deep vein thrombosis)    3. Chronic diastolic congestive heart failure    4. Essential hypertension    5. DVT of deep femoral vein, right    6. Immunodeficiency due to conditions classified elsewhere    7. Anemia complicating neoplastic disease        Plan :           Gastric adenocarcinoma pT1b atleast , clinically T2   Found on work up for gastric bypass surgery  EGD and EUS showed gastric tumor, biopsy neg for malignancy  Endoscopic resection- 5/25/21 Path Adenocarcinoma, moderate to poorly differentiated, Tumor invades deep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

## 2022-06-27 DIAGNOSIS — M25.562 CHRONIC PAIN OF LEFT KNEE: ICD-10-CM

## 2022-06-27 DIAGNOSIS — G89.29 CHRONIC PAIN OF LEFT KNEE: ICD-10-CM

## 2022-06-27 DIAGNOSIS — G89.4 CHRONIC PAIN DISORDER: ICD-10-CM

## 2022-06-27 DIAGNOSIS — M17.12 PRIMARY OSTEOARTHRITIS OF LEFT KNEE: ICD-10-CM

## 2022-06-27 DIAGNOSIS — C16.9 GASTRIC ADENOCARCINOMA: ICD-10-CM

## 2022-06-27 RX ORDER — OXYCODONE AND ACETAMINOPHEN 5; 325 MG/1; MG/1
1 TABLET ORAL EVERY 8 HOURS PRN
Qty: 90 TABLET | Refills: 0 | Status: SHIPPED | OUTPATIENT
Start: 2022-06-27 | End: 2022-07-28 | Stop reason: SDUPTHER

## 2022-06-27 NOTE — TELEPHONE ENCOUNTER
Patient requesting refill on oxycodone 5-325 mg  Last office visit 05/16/22   shows last refill on 04/25/22  Patient does have a pain contract on file with [facility]  Patient last UDS 10/04/19 was consistent with current therapy       CODEINE  Not Detected    MORPHINE  Not Detected    6-ACETYLMORPHINE  Not Detected    OXYCODONE  Present    NOROYXCODONE  Present    OXYMORPHONE  Not Detected    NOROXYMORPHONE  Not Detected    HYDROCODONE  Not Detected    NORHYDROCODONE  Not Detected    HYDROMORPHONE  Not Detected    BUPRENORPHINE  Not Detected    NORUBPRENORPHINE  Not Detected    FENTANYL  Not Detected    NORFENTANYL  Not Detected    MEPERIDINE METABOLITE  Not Detected    TAPENTADOL  Not Detected    TAPENTADOL-O-SULF  Not Detected    METHADONE  Not Detected    PROPOXYPHENE  Not Detected    TRAMADOL  Not Detected    AMPHETAMINE  Not Detected    METHAMPHETAMINE  Not Detected    MDMA- ECSTASY  Not Detected    MDA  Not Detected    MDEA- Genoveva  Not Detected    METHYLPHENIDATE  Not Detected    PHENTERMINE  Not Detected    BENZOYLECGONINE  Not Detected    ALPRAZOLAM  Not Detected    ALPHA-OH-ALPRAZOLAM  Not Detected    CLONAZEPAM  Not Detected    7-AMINOCLONAZEPAM  Not Detected    DIAZEPAM  Not Detected    NORDIAZEPAM  Not Detected    OXAZEPAM  Not Detected    TEMAZEPAM  Not Detected    Lorazepam  Not Detected    MIDAZOLAM  Not Detected    ZOLPIDEM  Not Detected    BARBITURATES  Not Detected    Creatinine, Urine 20.0 - 400.0 mg/dL 102.0    ETHYL GLUCURONIDE  Not Detected    MARIJUANA METABOLITE  Not Detected    PCP  Not Detected    CARISOPRODOL  Not Detected    Comment: The carisoprodol immunoassay has cross-reactivity to   carisoprodol and meprobamate.

## 2022-06-28 LAB — METHYLMALONATE SERPL-SCNC: 0.12 UMOL/L

## 2022-07-19 ENCOUNTER — HOSPITAL ENCOUNTER (OUTPATIENT)
Dept: RADIOLOGY | Facility: HOSPITAL | Age: 72
Discharge: HOME | End: 2022-07-19
Attending: STUDENT IN AN ORGANIZED HEALTH CARE EDUCATION/TRAINING PROGRAM
Payer: MEDICAID

## 2022-07-19 DIAGNOSIS — C16.9 GASTRIC ADENOCARCINOMA: ICD-10-CM

## 2022-07-19 LAB — POCT GLUCOSE: 89 MG/DL (ref 70–110)

## 2022-07-19 PROCEDURE — 78815 NM PET CT ROUTINE: ICD-10-PCS | Mod: 26,PS,, | Performed by: RADIOLOGY

## 2022-07-19 PROCEDURE — A9698 NON-RAD CONTRAST MATERIALNOC: HCPCS | Performed by: STUDENT IN AN ORGANIZED HEALTH CARE EDUCATION/TRAINING PROGRAM

## 2022-07-19 PROCEDURE — 78815 PET IMAGE W/CT SKULL-THIGH: CPT | Mod: 26,PS,, | Performed by: RADIOLOGY

## 2022-07-19 PROCEDURE — 78815 PET IMAGE W/CT SKULL-THIGH: CPT | Mod: TC

## 2022-07-19 PROCEDURE — 25500020 PHARM REV CODE 255: Performed by: STUDENT IN AN ORGANIZED HEALTH CARE EDUCATION/TRAINING PROGRAM

## 2022-07-19 RX ADMIN — IOHEXOL 1000 ML: 9 SOLUTION ORAL at 09:07

## 2022-07-21 ENCOUNTER — TELEPHONE (OUTPATIENT)
Dept: HEMATOLOGY/ONCOLOGY | Facility: CLINIC | Age: 72
End: 2022-07-21
Payer: MEDICARE

## 2022-07-21 NOTE — TELEPHONE ENCOUNTER
Call and spoke with Ms Coats. Informed Ms Coats, Dr Pelaez has reviewed the results of her recent PET scan and she states the scan looks good. Dr Pelaez states she will discuss further at her next clinic visit tomorrow.

## 2022-07-21 NOTE — TELEPHONE ENCOUNTER
----- Message from Cathy Pelaez MD sent at 7/21/2022  2:27 PM CDT -----  Please inform pt that her PET scans looks good. I will discuss further at next clinic appt. Thanks

## 2022-07-22 ENCOUNTER — OFFICE VISIT (OUTPATIENT)
Dept: HEMATOLOGY/ONCOLOGY | Facility: CLINIC | Age: 72
End: 2022-07-22
Payer: MEDICARE

## 2022-07-22 VITALS
RESPIRATION RATE: 18 BRPM | HEIGHT: 66 IN | WEIGHT: 266.75 LBS | SYSTOLIC BLOOD PRESSURE: 107 MMHG | DIASTOLIC BLOOD PRESSURE: 63 MMHG | HEART RATE: 68 BPM | BODY MASS INDEX: 42.87 KG/M2 | TEMPERATURE: 98 F | OXYGEN SATURATION: 97 %

## 2022-07-22 DIAGNOSIS — D84.81 IMMUNODEFICIENCY DUE TO CONDITIONS CLASSIFIED ELSEWHERE: ICD-10-CM

## 2022-07-22 DIAGNOSIS — C16.9 GASTRIC ADENOCARCINOMA: Primary | ICD-10-CM

## 2022-07-22 DIAGNOSIS — E66.01 MORBID OBESITY: ICD-10-CM

## 2022-07-22 DIAGNOSIS — Z86.718 HISTORY OF DVT (DEEP VEIN THROMBOSIS): ICD-10-CM

## 2022-07-22 DIAGNOSIS — I50.32 CHRONIC DIASTOLIC CONGESTIVE HEART FAILURE: ICD-10-CM

## 2022-07-22 DIAGNOSIS — I10 ESSENTIAL HYPERTENSION: ICD-10-CM

## 2022-07-22 PROCEDURE — 1160F RVW MEDS BY RX/DR IN RCRD: CPT | Mod: CPTII,S$GLB,, | Performed by: STUDENT IN AN ORGANIZED HEALTH CARE EDUCATION/TRAINING PROGRAM

## 2022-07-22 PROCEDURE — 3074F PR MOST RECENT SYSTOLIC BLOOD PRESSURE < 130 MM HG: ICD-10-PCS | Mod: CPTII,S$GLB,, | Performed by: STUDENT IN AN ORGANIZED HEALTH CARE EDUCATION/TRAINING PROGRAM

## 2022-07-22 PROCEDURE — 3008F BODY MASS INDEX DOCD: CPT | Mod: CPTII,S$GLB,, | Performed by: STUDENT IN AN ORGANIZED HEALTH CARE EDUCATION/TRAINING PROGRAM

## 2022-07-22 PROCEDURE — 3288F FALL RISK ASSESSMENT DOCD: CPT | Mod: CPTII,S$GLB,, | Performed by: STUDENT IN AN ORGANIZED HEALTH CARE EDUCATION/TRAINING PROGRAM

## 2022-07-22 PROCEDURE — 1159F PR MEDICATION LIST DOCUMENTED IN MEDICAL RECORD: ICD-10-PCS | Mod: CPTII,S$GLB,, | Performed by: STUDENT IN AN ORGANIZED HEALTH CARE EDUCATION/TRAINING PROGRAM

## 2022-07-22 PROCEDURE — 1159F MED LIST DOCD IN RCRD: CPT | Mod: CPTII,S$GLB,, | Performed by: STUDENT IN AN ORGANIZED HEALTH CARE EDUCATION/TRAINING PROGRAM

## 2022-07-22 PROCEDURE — 1160F PR REVIEW ALL MEDS BY PRESCRIBER/CLIN PHARMACIST DOCUMENTED: ICD-10-PCS | Mod: CPTII,S$GLB,, | Performed by: STUDENT IN AN ORGANIZED HEALTH CARE EDUCATION/TRAINING PROGRAM

## 2022-07-22 PROCEDURE — 99499 RISK ADDL DX/OHS AUDIT: ICD-10-PCS | Mod: S$GLB,,, | Performed by: STUDENT IN AN ORGANIZED HEALTH CARE EDUCATION/TRAINING PROGRAM

## 2022-07-22 PROCEDURE — 99214 OFFICE O/P EST MOD 30 MIN: CPT | Mod: S$GLB,,, | Performed by: STUDENT IN AN ORGANIZED HEALTH CARE EDUCATION/TRAINING PROGRAM

## 2022-07-22 PROCEDURE — 99999 PR PBB SHADOW E&M-EST. PATIENT-LVL III: ICD-10-PCS | Mod: PBBFAC,,, | Performed by: STUDENT IN AN ORGANIZED HEALTH CARE EDUCATION/TRAINING PROGRAM

## 2022-07-22 PROCEDURE — 3074F SYST BP LT 130 MM HG: CPT | Mod: CPTII,S$GLB,, | Performed by: STUDENT IN AN ORGANIZED HEALTH CARE EDUCATION/TRAINING PROGRAM

## 2022-07-22 PROCEDURE — 1101F PR PT FALLS ASSESS DOC 0-1 FALLS W/OUT INJ PAST YR: ICD-10-PCS | Mod: CPTII,S$GLB,, | Performed by: STUDENT IN AN ORGANIZED HEALTH CARE EDUCATION/TRAINING PROGRAM

## 2022-07-22 PROCEDURE — 99214 PR OFFICE/OUTPT VISIT, EST, LEVL IV, 30-39 MIN: ICD-10-PCS | Mod: S$GLB,,, | Performed by: STUDENT IN AN ORGANIZED HEALTH CARE EDUCATION/TRAINING PROGRAM

## 2022-07-22 PROCEDURE — 4010F ACE/ARB THERAPY RXD/TAKEN: CPT | Mod: CPTII,S$GLB,, | Performed by: STUDENT IN AN ORGANIZED HEALTH CARE EDUCATION/TRAINING PROGRAM

## 2022-07-22 PROCEDURE — 4010F PR ACE/ARB THEARPY RXD/TAKEN: ICD-10-PCS | Mod: CPTII,S$GLB,, | Performed by: STUDENT IN AN ORGANIZED HEALTH CARE EDUCATION/TRAINING PROGRAM

## 2022-07-22 PROCEDURE — 3078F DIAST BP <80 MM HG: CPT | Mod: CPTII,S$GLB,, | Performed by: STUDENT IN AN ORGANIZED HEALTH CARE EDUCATION/TRAINING PROGRAM

## 2022-07-22 PROCEDURE — 3008F PR BODY MASS INDEX (BMI) DOCUMENTED: ICD-10-PCS | Mod: CPTII,S$GLB,, | Performed by: STUDENT IN AN ORGANIZED HEALTH CARE EDUCATION/TRAINING PROGRAM

## 2022-07-22 PROCEDURE — 1101F PT FALLS ASSESS-DOCD LE1/YR: CPT | Mod: CPTII,S$GLB,, | Performed by: STUDENT IN AN ORGANIZED HEALTH CARE EDUCATION/TRAINING PROGRAM

## 2022-07-22 PROCEDURE — 3288F PR FALLS RISK ASSESSMENT DOCUMENTED: ICD-10-PCS | Mod: CPTII,S$GLB,, | Performed by: STUDENT IN AN ORGANIZED HEALTH CARE EDUCATION/TRAINING PROGRAM

## 2022-07-22 PROCEDURE — 99499 UNLISTED E&M SERVICE: CPT | Mod: S$GLB,,, | Performed by: STUDENT IN AN ORGANIZED HEALTH CARE EDUCATION/TRAINING PROGRAM

## 2022-07-22 PROCEDURE — 1126F AMNT PAIN NOTED NONE PRSNT: CPT | Mod: CPTII,S$GLB,, | Performed by: STUDENT IN AN ORGANIZED HEALTH CARE EDUCATION/TRAINING PROGRAM

## 2022-07-22 PROCEDURE — 99999 PR PBB SHADOW E&M-EST. PATIENT-LVL III: CPT | Mod: PBBFAC,,, | Performed by: STUDENT IN AN ORGANIZED HEALTH CARE EDUCATION/TRAINING PROGRAM

## 2022-07-22 PROCEDURE — 1126F PR PAIN SEVERITY QUANTIFIED, NO PAIN PRESENT: ICD-10-PCS | Mod: CPTII,S$GLB,, | Performed by: STUDENT IN AN ORGANIZED HEALTH CARE EDUCATION/TRAINING PROGRAM

## 2022-07-22 PROCEDURE — 3078F PR MOST RECENT DIASTOLIC BLOOD PRESSURE < 80 MM HG: ICD-10-PCS | Mod: CPTII,S$GLB,, | Performed by: STUDENT IN AN ORGANIZED HEALTH CARE EDUCATION/TRAINING PROGRAM

## 2022-07-22 NOTE — PROGRESS NOTES
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 stress test which was abnormal jan 2021, followed by [facility] in Feb 2021 which showed clean coronaries.   EKG 2/2021- sinus rhythm   Per cardiology    Problem List Items Addressed This Visit     Essential hypertension    Gastric adenocarcinoma - Primary    Overview     gastric cancer cT2 or higher           Chronic diastolic congestive heart failure (Chronic)    History of DVT (deep vein thrombosis) (Chronic)      Other Visit Diagnoses     Immunodeficiency due to conditions classified elsewhere        Morbid obesity              Electronically signed by Cathy Pelaez    [facility]      Future Appointments   Date Time Provider Department Center   7/27/2022  9:30 AM [facility] MAMMO1 [facility] MAMMO [facility]   8/16/2022 10:20 AM Savanna Hyatt NP [facility] PAINMGT [facility]   11/28/2022 10:40 AM Lei Garcia MD [facility] Dario ARZO           This note was created with voice recognition software.  Grammatical, syntax and spelling errors may be inevitable.

## 2022-07-27 ENCOUNTER — HOSPITAL ENCOUNTER (OUTPATIENT)
Dept: RADIOLOGY | Facility: OTHER | Age: 72
Discharge: HOME | End: 2022-07-27
Attending: INTERNAL MEDICINE
Payer: MEDICARE

## 2022-07-27 DIAGNOSIS — Z12.31 ENCOUNTER FOR SCREENING MAMMOGRAM FOR MALIGNANT NEOPLASM OF BREAST: ICD-10-CM

## 2022-07-27 PROCEDURE — 77067 MAMMO DIGITAL SCREENING BILAT WITH TOMO: ICD-10-PCS | Mod: 26,,, | Performed by: RADIOLOGY

## 2022-07-27 PROCEDURE — 77063 BREAST TOMOSYNTHESIS BI: CPT | Mod: 26,,, | Performed by: RADIOLOGY

## 2022-07-27 PROCEDURE — 77063 MAMMO DIGITAL SCREENING BILAT WITH TOMO: ICD-10-PCS | Mod: 26,,, | Performed by: RADIOLOGY

## 2022-07-27 PROCEDURE — 77063 BREAST TOMOSYNTHESIS BI: CPT | Mod: TC

## 2022-07-27 PROCEDURE — 77067 SCR MAMMO BI INCL CAD: CPT | Mod: 26,,, | Performed by: RADIOLOGY

## 2022-07-27 NOTE — TELEPHONE ENCOUNTER
No new care gaps identified.  [facility] Embedded Care Gaps. Reference number: [id number]. 7/27/2022   1:44:32 PM CDT

## 2022-07-28 DIAGNOSIS — C16.9 GASTRIC ADENOCARCINOMA: ICD-10-CM

## 2022-07-28 DIAGNOSIS — M25.562 CHRONIC PAIN OF LEFT KNEE: ICD-10-CM

## 2022-07-28 DIAGNOSIS — M17.12 PRIMARY OSTEOARTHRITIS OF LEFT KNEE: ICD-10-CM

## 2022-07-28 DIAGNOSIS — G89.29 CHRONIC PAIN OF LEFT KNEE: ICD-10-CM

## 2022-07-28 DIAGNOSIS — G89.4 CHRONIC PAIN DISORDER: ICD-10-CM

## 2022-07-28 RX ORDER — OXYCODONE AND ACETAMINOPHEN 5; 325 MG/1; MG/1
1 TABLET ORAL EVERY 8 HOURS PRN
Qty: 90 TABLET | Refills: 0 | Status: SHIPPED | OUTPATIENT
Start: 2022-07-28 | End: 2022-08-16 | Stop reason: SDUPTHER

## 2022-07-28 RX ORDER — PANTOPRAZOLE SODIUM 40 MG/1
40 TABLET, DELAYED RELEASE ORAL DAILY
Qty: 90 TABLET | Refills: 3 | Status: SHIPPED | OUTPATIENT
Start: 2022-07-28 | End: 2022-11-18 | Stop reason: SDUPTHER

## 2022-07-28 NOTE — TELEPHONE ENCOUNTER
Staff called pt to inform pt that we received her jahairall request and have submitted it to the provider once provider responds we will update pt. Sg      ----- Message from Rosario Joel sent at 7/28/2022  1:11 PM CDT -----  Regarding: Refill request  Type:  RX Refill Request    Who Called: DARCY RENAE [[id number]]    Refill or New Rx: Refill     RX Name and Strength: oxyCODONE-acetaminophen (PERCOCET) 5-325 mg per tablet    How is the patient currently taking it? (ex. 1XDay) 3xday     Is this a 30 day or 90 day RX: 30 days     Preferred Pharmacy with phone number: [facility]    Local or Mail Order: local     Ordering Provider:  Ranulfo Laughlin Call Back Number: [phone]    Additional Information: She states she is out today and only has someone to pick it up for tomorrow morning.

## 2022-07-28 NOTE — TELEPHONE ENCOUNTER
Patient requesting refill on oxycodone 5/325 mg  Last office visit 05/16/22   shows last refill on 06/27/22  Patient does [facility]  Patient last UDS 10/14/19 was consistent with current therapy    CODEINE  Not Detected    MORPHINE  Not Detected    6-ACETYLMORPHINE  Not Detected    OXYCODONE  Present    NOROYXCODONE  Present    OXYMORPHONE  Not Detected    NOROXYMORPHONE  Not Detected    HYDROCODONE  Not Detected    NORHYDROCODONE  Not Detected    HYDROMORPHONE  Not Detected    BUPRENORPHINE  Not Detected    NORUBPRENORPHINE  Not Detected    FENTANYL  Not Detected    NORFENTANYL  Not Detected    MEPERIDINE METABOLITE  Not Detected    TAPENTADOL  Not Detected    TAPENTADOL-O-SULF  Not Detected    METHADONE  Not Detected    PROPOXYPHENE  Not Detected    TRAMADOL  Not Detected    AMPHETAMINE  Not Detected    METHAMPHETAMINE  Not Detected    MDMA- ECSTASY  Not Detected    MDA  Not Detected    MDEA- Genoveva  Not Detected    METHYLPHENIDATE  Not Detected    PHENTERMINE  Not Detected    BENZOYLECGONINE  Not Detected    ALPRAZOLAM  Not Detected    ALPHA-OH-ALPRAZOLAM  Not Detected    CLONAZEPAM  Not Detected    7-AMINOCLONAZEPAM  Not Detected    DIAZEPAM  Not Detected    NORDIAZEPAM  Not Detected    OXAZEPAM  Not Detected    TEMAZEPAM  Not Detected    Lorazepam  Not Detected    MIDAZOLAM  Not Detected    ZOLPIDEM  Not Detected    BARBITURATES  Not Detected    Creatinine, Urine 20.0 - 400.0 mg/dL 102.0    ETHYL GLUCURONIDE  Not Detected    MARIJUANA METABOLITE  Not Detected    PCP  Not Detected    CARISOPRODOL  Not Detected    Comment: The carisoprodol immunoassay has cross-reactivity to   carisoprodol and meprobamate.    PAIN MANAGEMENT DRUG PANEL  See Below    Comment: Methodology: Qualitative Enzyme Immunoassay and Qualitative   Liquid Chromatography-Time of Flight-Mass Spectrometry or   Tandem Mass Spectrometry, Quantitative Spectrophotometry   The absence  of expected drug(s) and/or drug metabolite(s)   may indicate non-compliance, inappropriate timing

## 2022-08-16 ENCOUNTER — OFFICE VISIT (OUTPATIENT)
Dept: PAIN MEDICINE | Facility: CLINIC | Age: 72
End: 2022-08-16
Payer: MEDICARE

## 2022-08-16 VITALS
BODY MASS INDEX: 42.91 KG/M2 | WEIGHT: 267 LBS | TEMPERATURE: 98 F | DIASTOLIC BLOOD PRESSURE: 67 MMHG | HEART RATE: 75 BPM | HEIGHT: 66 IN | SYSTOLIC BLOOD PRESSURE: 108 MMHG

## 2022-08-16 DIAGNOSIS — C16.9 GASTRIC ADENOCARCINOMA: ICD-10-CM

## 2022-08-16 DIAGNOSIS — Z79.891 ENCOUNTER FOR MONITORING OPIOID MAINTENANCE THERAPY: ICD-10-CM

## 2022-08-16 DIAGNOSIS — M25.562 CHRONIC PAIN OF LEFT KNEE: Primary | ICD-10-CM

## 2022-08-16 DIAGNOSIS — M17.12 PRIMARY OSTEOARTHRITIS OF LEFT KNEE: ICD-10-CM

## 2022-08-16 DIAGNOSIS — G89.4 CHRONIC PAIN DISORDER: ICD-10-CM

## 2022-08-16 DIAGNOSIS — G89.29 CHRONIC PAIN OF LEFT KNEE: ICD-10-CM

## 2022-08-16 DIAGNOSIS — Z51.81 ENCOUNTER FOR MONITORING OPIOID MAINTENANCE THERAPY: ICD-10-CM

## 2022-08-16 DIAGNOSIS — G89.29 CHRONIC PAIN OF LEFT KNEE: Primary | ICD-10-CM

## 2022-08-16 DIAGNOSIS — M25.562 CHRONIC PAIN OF LEFT KNEE: ICD-10-CM

## 2022-08-16 PROCEDURE — 3288F PR FALLS RISK ASSESSMENT DOCUMENTED: ICD-10-PCS | Mod: CPTII,S$GLB,, | Performed by: NURSE PRACTITIONER

## 2022-08-16 PROCEDURE — 1126F PR PAIN SEVERITY QUANTIFIED, NO PAIN PRESENT: ICD-10-PCS | Mod: CPTII,S$GLB,, | Performed by: NURSE PRACTITIONER

## 2022-08-16 PROCEDURE — 1159F PR MEDICATION LIST DOCUMENTED IN MEDICAL RECORD: ICD-10-PCS | Mod: CPTII,S$GLB,, | Performed by: NURSE PRACTITIONER

## 2022-08-16 PROCEDURE — 99214 OFFICE O/P EST MOD 30 MIN: CPT | Mod: S$GLB,,, | Performed by: NURSE PRACTITIONER

## 2022-08-16 PROCEDURE — 1160F RVW MEDS BY RX/DR IN RCRD: CPT | Mod: CPTII,S$GLB,, | Performed by: NURSE PRACTITIONER

## 2022-08-16 PROCEDURE — 80326 AMPHETAMINES 5 OR MORE: CPT | Performed by: NURSE PRACTITIONER

## 2022-08-16 PROCEDURE — 1101F PT FALLS ASSESS-DOCD LE1/YR: CPT | Mod: CPTII,S$GLB,, | Performed by: NURSE PRACTITIONER

## 2022-08-16 PROCEDURE — 99999 PR PBB SHADOW E&M-EST. PATIENT-LVL IV: CPT | Mod: PBBFAC,,, | Performed by: NURSE PRACTITIONER

## 2022-08-16 PROCEDURE — 3074F PR MOST RECENT SYSTOLIC BLOOD PRESSURE < 130 MM HG: ICD-10-PCS | Mod: CPTII,S$GLB,, | Performed by: NURSE PRACTITIONER

## 2022-08-16 PROCEDURE — 3078F DIAST BP <80 MM HG: CPT | Mod: CPTII,S$GLB,, | Performed by: NURSE PRACTITIONER

## 2022-08-16 PROCEDURE — 3008F BODY MASS INDEX DOCD: CPT | Mod: CPTII,S$GLB,, | Performed by: NURSE PRACTITIONER

## 2022-08-16 PROCEDURE — 4010F ACE/ARB THERAPY RXD/TAKEN: CPT | Mod: CPTII,S$GLB,, | Performed by: NURSE PRACTITIONER

## 2022-08-16 PROCEDURE — 1159F MED LIST DOCD IN RCRD: CPT | Mod: CPTII,S$GLB,, | Performed by: NURSE PRACTITIONER

## 2022-08-16 PROCEDURE — 99999 PR PBB SHADOW E&M-EST. PATIENT-LVL IV: ICD-10-PCS | Mod: PBBFAC,,, | Performed by: NURSE PRACTITIONER

## 2022-08-16 PROCEDURE — 3074F SYST BP LT 130 MM HG: CPT | Mod: CPTII,S$GLB,, | Performed by: NURSE PRACTITIONER

## 2022-08-16 PROCEDURE — 3288F FALL RISK ASSESSMENT DOCD: CPT | Mod: CPTII,S$GLB,, | Performed by: NURSE PRACTITIONER

## 2022-08-16 PROCEDURE — 3008F PR BODY MASS INDEX (BMI) DOCUMENTED: ICD-10-PCS | Mod: CPTII,S$GLB,, | Performed by: NURSE PRACTITIONER

## 2022-08-16 PROCEDURE — 1160F PR REVIEW ALL MEDS BY PRESCRIBER/CLIN PHARMACIST DOCUMENTED: ICD-10-PCS | Mod: CPTII,S$GLB,, | Performed by: NURSE PRACTITIONER

## 2022-08-16 PROCEDURE — 3078F PR MOST RECENT DIASTOLIC BLOOD PRESSURE < 80 MM HG: ICD-10-PCS | Mod: CPTII,S$GLB,, | Performed by: NURSE PRACTITIONER

## 2022-08-16 PROCEDURE — 4010F PR ACE/ARB THEARPY RXD/TAKEN: ICD-10-PCS | Mod: CPTII,S$GLB,, | Performed by: NURSE PRACTITIONER

## 2022-08-16 PROCEDURE — 1101F PR PT FALLS ASSESS DOC 0-1 FALLS W/OUT INJ PAST YR: ICD-10-PCS | Mod: CPTII,S$GLB,, | Performed by: NURSE PRACTITIONER

## 2022-08-16 PROCEDURE — 1126F AMNT PAIN NOTED NONE PRSNT: CPT | Mod: CPTII,S$GLB,, | Performed by: NURSE PRACTITIONER

## 2022-08-16 PROCEDURE — 99214 PR OFFICE/OUTPT VISIT, EST, LEVL IV, 30-39 MIN: ICD-10-PCS | Mod: S$GLB,,, | Performed by: NURSE PRACTITIONER

## 2022-08-16 RX ORDER — OXYCODONE AND ACETAMINOPHEN 5; 325 MG/1; MG/1
1 TABLET ORAL EVERY 8 HOURS PRN
Qty: 90 TABLET | Refills: 0 | Status: SHIPPED | OUTPATIENT
Start: 2022-08-26 | End: 2022-09-27 | Stop reason: SDUPTHER

## 2022-08-16 NOTE — PROGRESS NOTES
"Chronic patient Established Note (Follow up visit)    HPI:  Ca Coats is a 69 y.o. female who presents today with left knee pain. Her pain has been going on for "too long."  She was previously treated by Dr. Iyer at [facility].  She has injections [id number] with him.  These were helpful, but she does not know if the injections were steroid or viscosupplementation.   This pain is described in detail below.     Interval History (4/30/2018):  The patient returns to clinic today for follow up and records review. We did received records from [facility] including a MRI of her knee. Dr. Iyer's last office visit note from January 2017 does not include any previous injections. She continues to report bilateral knee pain, left greater than right. She describes this pain as constant and aching. This pain is worse with prolonged walking. She also report right shoulder pain with prolonged overhead activity. She continues to take Percocet with benefit. She denies any other health changes.     Interval History (5/31/2018):  The patient returns to clinic for follow up. She is s/p left knee Synvisc One injection on 5/8/2018. She reports 35% relief of her knee pain. She continues to report bilateral knee pain, left greater than right. She describes this pain as constant and aching. Her pain is worse with prolonged walking and standing. She is scheduled to see Orthopedics at the  End of this month. She is also following up with Bariatrics to discuss the lap band procedure. She continues to take Percocet with benefit. She also uses Voltaren gel with benefit but this is expensive for her. She denies any other health changes.          Interval History (7/2/2018):  The patient returns to clinic today for follow up. She continues to report left knee pain that is constant, sharp, and aching in nature. Her pain is worse with prolonged walking and standing. She is scheduled for weight loss surgery in August. She continues " "follow up with orthopedics who does not recommend surgery at this time due to her BMI. She continues to take Percocet as needed with benefit. She denies any other health changes.      Interval History (7/19/2018):  The patient returns to clinic today for follow up. She is s/p left genicular nerve block on 7/10/2018. She reports 80% relief of her left knee pain. She reports that she was able to walk for longer distances (3 blocks) after the block. Her pain has returned to baseline. She continues to report left knee pain that is constant, sharp, and aching in nature. Her pain is worse with prolonged walking and standing. She denies any other health changes.      Interval History (8/21/2018):  The patient returns to clinic today for follow up. She is s/p left genicular cooled RFA on 7/31/2018. She reports 60% relief of her left knee pain. She reports intermittent knee pain that is sore and aching in nature. She continues to perform a home exercise routine. She is actively trying to lose weight. She continues to follow up with Bariatric [place]. She is considering weight loss surgery. She continues to use compound cream with benefit. She denies any other health changes.      Interval History (11/21/2018):  The patient returns to clinic today for follow up. She reports increased left knee pain this past week. She reports 2 episodes where her knee has "locked" up on her while walking. She describes her knee pain as sore and aching. She is actively trying to lose weight and quit smoking. She continues to use the compound cream with benefit. She denies any other health changes.      Interval History (1/9/2019):  The patient returns to clinic today for follow up. She is s/p left knee steroid injection on 12/21/2018. She reports one day of relief. She continues to report left knee pain that is constant, sharp, and aching in nature. Her pain is worse with standing and walking. She reports that her knee "locks" up on " her while walking. She often feels as though she is going to fall. She is scheduled for bariatric weight loss surgery in February at [facility]. She denies any other health changes.      Interval History (2/28/2019):  The patient returns to clinic today for follow up. She is s/p Euflexxa series completed on 1/30/2019. She reports that she able to stand for longer periods of time since the procedure. She continues to report left knee pain that is sore and aching in nature. Her pain is worse with prolonged standing and walking. She was previously scheduled for weight loss surgery but reports this was canceled. She is scheduled for follow up next week about this. She denies any other health changes.      Interval History (4/12/2019):  The patient returns for f/u of left knee pain.  She is s/p left genicular cooled RFA with about 90% pain relief.  She has been able to walk easier.  She also notices less stiffness in her knee.  She is planning on gastric surgery prior to knee replacement.  She had benefit with compounding cream but it is no longer covered by her insurance.  She does take Percocet from her PCP.  Her pain today is 7/10.     Interval History (7/11/19):  Patient reported to ED today for increased L knee pain and an episode of LLE weakness that lead to a near fall. Patient is s/p L genicular RFA in 3/19/19 that provided 90% relief for 2 months then came back. Pain is a crunchy pain, in left knee, that does not radiate, worse with movement, better with rest. Pain today is 9/10. She normally uses cane to ambulate but is currently using wheelchair at hospital. Denies any trauma ot the area, fever/chills, n/v, abdominal pain, or HA.     Interval History (8/8/2019):  She returns today for follow up s/p left knee genicular chemodenervation with phenol 7/19/19.  She reports that this procedure did not provide any improvement in her left knee symptoms, and she is still having significant difficulty with ambulation, still  uses wheelchair for mobility. She also reports left lateral thigh and hip pain lateral upper thigh numbness. She has been admitted to [facility] [place] but is not receiving PT there. She is still considering lap band surgery but has missed a followup appointment.      Interval History (9/4/2019):  The patient returns to clinic today for follow up. She continues to report intermittent left lateral thigh and hip pain that is tingling and numb in nature. She continues to report left knee pain. She continues to have difficulty ambulating due to pain. She is currently living in a SNF. She continues to take Gabapentin 300 mg BID. This was increased at last visit but not increased at the nursing home. She does report benefit with Percocet though it only last approximately 4-5 hours. She denies any adverse effects. She does present with a care attendant from the SNF today. Her pain today is 10/10.     Interval History (10/4/2019):  The patient returns to clinic today for follow up and medication refill. She continues to report bilateral knee pain that is aching in nature. She reports intermittent left lateral thigh pain that is tingling and shooting in nature. She reports that her pain has been improving since last visit. She has increased her activity. She continues to report benefit with Gabapentin. She continues to take Percocet as needed with benefit. She denies any other health changes. Her pain today is 0/10.     Interval History (1/6/2020):  The patient returns to clinic today for follow up. She reports increased left leg pain today that is tingling in nature. She continues to report bilateral knee pain, left greater than right. She describes this pain as constant, sharp, and aching. She is no longer living at the nursing facility. She has increased her home exercise routine. She is actively trying to quit smoking in order to move forward with bariatric surgery. She continues to report benefit with current  medication regimen. She takes Gabapentin and Percocet with benefit and without adverse effect. She denies any other health changes. Her pain today is 7/10.     Interval History (4/8/2020):  She returns today for follow up via audio.  She reports that the methocarbamol is not helping.  She continues to have a left-sided low back pain that is bothering her.  Her left knee continues to hurt.  Her right knee is hurting a little as well.  Percocet continues to help, but it is not lasting long enough.     Interval History (5/1/2020):  The patient returns for audio visit today for follow up. She continues to report bilateral knee pain, left greater than right. Her pain is worse with prolonged standing and walking. She reports that her back pain has improved. She continues to report benefit with current medication regimen. She continues to take Gabapentin and Percocet with benefit and without adverse effects. She denies any other health changes. Her pain today is 8/10.     Interval History (6/2/2020):  The patient returns to clinic today via audio visit for follow up of knee pain. She reports increased left knee pain in the last week. Her pain is worse with standing and walking. She feels as though she will fall when standing. She is currently using ice, heat, and OTC lidocaine patches with limited relief. She continues to take Gabapentin and Percocet with some benefit. She denies any adverse effects. She denies any other health changes. Her pain today is 10/10.     Interval History 7/8/2020:  DARCY Coats presents to the clinic for a follow-up appointment for follow up after 6/19/20 RFA Since the last visit, DARCY Coats states the pain has been persistant. Current pain intensity is 10/10.    Interval History 8/11/2020:  The patient returns to clinic today for follow up of knee pain. She is s/p left knee Orthovisc injection on 7/28/2020. She reports limited relief at this time. She continues to report left knee  pain, especially with standing and walking. She feels as though her knee will buckle. She has recently quit smoking. She is actively trying to lose weight. She was recently started on Ozempic per her PCP. She is following her diet plan. She continues to take Percocet with benefit but reports that it does not last. She denies any adverse effects with this medication. She denies any other health changes. Her pain today is 8/10.    Interval History 9/3/2020:  The patient returns to clinic today for follow up of knee pain. She reports some relief with left knee Orthovisc injection in July. She continues to report left knee pain that is sharp in nature. This pain is worse with standing and walking. She feels as though her knee will give out with prolonged standing. She is actively trying to lose weight but is frustrated with lack of progress. She is following her diet plan. She continues to Percocet with benefit but it does not last. She denies any adverse effects. She denies any other health changes. Her pain today is 7/10.    Interval History 12/1/2020:  The patient returns to clinic today for follow up of knee pain. She reports increased left knee pain over the last few weeks. She also reports that her left knee feels weaker over the last month. She feels as thought it will give out with standing and walking. She would like to repeat RFA as she feels this helps her feel more stable and decreases her pain. She continues to take Percocet with some benefit but asks if this can be stronger. She denies any adverse effects with this medication. She continues to follow up with Bariatrics. She continues to follow a diet plan and take Ozempic. She denies any other health changes. Her pain today is 10/10.    Interval History 3/24/2021:  The patient returns to clinic today for follow up of knee pain. She is s/p left genicular RFA on 1/22/2021. She reports some benefit of her left knee pain. She is able to stand and walking for  longer periods of time. She continues to report left knee pain. Since last visit, she had angiogram that was normal. She has discontinued anticoagulation. She also had a recent EGD which she is awaiting the results. She continues to see Bariatrics. She is actively trying to lose weight. She continues to take Gabapentin with benefit. She continues to take Percocet with benefit and without adverse effects. She denies any other health changes. Her pain today is 0/10.    Interval History 6/1/2021:  The patient returns to clinic today for follow up of knee pain. She reports increased left knee pain over the last two weeks. She has increased pain with standing and walking. She states that she can feel the bones rubbing together. She continues to follow up with Bariatrics. She is actively trying to lose weight. She continues to follow up with GI. She is hoping to pursue gastric bypass. She continues to take Gabapentin with benefit. She continues to take Percocet as needed with benefit and without adverse effects. She denies any other health changes. Her pain today is 6/10.    Interval History 7/27/2021:  DARCY Coats presents to the clinic for a follow-up appointment. Since the last visit, DARCY Coats states the pain has been worsening. Current pain intensity is 7/10. Patient had an RFA in the left knee in January 2021. She had really good relief until about June. She was given an hyaulorinic injection at that time. She had good relief for a few weeks but now she is back to the same amount of pain as before that injection. She is having pain with standing and walking. She is continuing to take percocet and it completely gets rid of her pain      Of note, patient was found to have gastric cancer. She is currently on chemotherapy. The plan is to continue chemo until the cancer can be surgically removed.     Interval History 9/14/2021:  The patient returns to clinic today for follow up of knee pain via audio visit.  She is s/p left genicular RFA on 7/30/2021. She reports 50% relief of her knee pain. She continues to report left knee pain with prolonged standing and walking. Since last visit, she has developed a breast abscess. This was drained yesterday. She is on antibiotics. She continues to undergo chemotherapy for gastric cancer. She continues to take Gabapentin with benefit. She continues to take Percocet with benefit and without adverse effects. She denies any other health changes. Her pain today is 2/10.     Interval History 11/9/2021:  The patient returns to clinic today for follow up of knee pain. She reports increased left knee pain, worse with standing and walking. Since last visit, she was admitted to the hospital for diarrhea and dehydration. She continues to undergo chemotherapy treatment for gastric cancer. She continues to take Gabapentin with benefit. She continues to take Percocet as needed with benefit and without adverse effects. She denies any other health changes. Her pain today is 7/10.       Interval History 12/28/2021:  The patient returns to clinic today for follow up of knee pain. She is s/p left knee Monovisc injection on 12/14/2021. She reports 70% relief of her left knee pain. She continues to report intermittent left knee pain, worse with standing and walking. She reports bilateral feet swelling, left greater than right. She has not seen her PCP. She has completed chemotherapy. She is scheduled for EGD in January to monitor tumor size with possible resection. She will likely undergo chemotherapy again after the scope. She continues to take Gabapentin with benefit. She continues to take Percocet as needed with benefit and without adverse effects. She denies any other health changes. Her pain today is 0/10.    Interval History 3/21/2022:  The patient returns to clinic today for follow up of knee pain. She report increased left knee pain, worse with prolonged standing and walking. She did have repeat  EGD with biopsies done. She is currently undergoing chemotherapy and radiation. She continues to take Percocet as needed with benefit and without adverse effects. She denies any other health changes. Her pain today is 0/10.    Interval History 5/16/2022:  The patient returns to clinic today for follow up of knee pain. She is s/p left genicular RFA on 4/22/2022. She reports 80% relief of her knee pain. She continues to report intermittent left knee pain. Her pain is worse with prolonged activity. She has completed radiation and chemotherapy. She is awaiting further testing to determine plan. She continues to take Percocet as needed with benefit and without adverse effects. She denies any other health changes. Her pain today is 2/10.    Interval History 8/16/2022:  The patient returns to clinic today for follow up of knee pain. She continues to report left knee pain. Her pain is much improved since RFA. Her pain is worse with prolonged standing and walking. She reports intermittent right lower leg pain, below her knee. She continues to follow up with Hem/Onc for gastric cancer. She continues to report benefit with Percocet. This allows her to perform her ADLs. She denies any adverse effects. She denies any other health changes. Her pain today is 0/10.      Pain Disability Index Review:  Last 3 PDI Scores 8/16/2022 3/21/2022 12/28/2021   Pain Disability Index (PDI) 0 17 16         Physical Therapy: Yes, she did this in 2018 for one month (twice a week). She does HEP (stretching and ROM in the morning)      Pain Medications:    Current:  · Gabapentin, tylenol 500mg Q6 prn, Percocet 5/325 mg TID PRN    Opioid Contract: yes     report:  Reviewed and consistent with medication use as prescribed.    Pain Procedures:   · 5/8/2018- Left knee Synvisc One injection  · 7/10/2018- Left genicular nerve block  · 7/31/2018- Left genicular cooled RFA  · 12/21/2018- Left knee steroid injection  · 1/31/2019- Left knee Euflexxa  series  · 3/19/2019 Left genicular cooled RFA- 90% relief  · 7/19/19- Left knee genicular phenol chemodenervation-  · 6/19/2020- Left genicular RFA  · 1/22/2021- Left genicular RFA  · 6/2021- Left knee Orthovisc injection  · 7/30/2021- Left genicular RFA  · 12/14/2021- Left knee Monovisc injection  · 4/22/2022- Left genicular RFA    Physical Therapy//Home Exercise: yes, she did this in 2018 for one month (twice a week). She does HEP (stretching and ROM in the morning)    Imaging:   MRI Left Knee 7/21/2017 (in media):  The medial meniscus is intact. The lateral meniscus is intact.      A chronic complete ACL tear is noted. The posterior cruciate ligament is intact. The medial and lateral retinacula are intact. The medial collateral ligament is intact. The lateral collateral ligament in intact. The posterolateral corner structures are intact.      There is full thickness fissuring of the central aspect medial femoral cartilage. The lateral tibiofemoral compartment cartilage is intact. There is broad thickness defect within the central trochlear cartilage. There is mild lateral patellofemoral cartilage thinning and regional full thickness loss of the central superior patellar cartilage.      A small effusion is present. There is trace infrapatellar fluid. Tiny popliteal cyst is noted. Subtle focal marrow edema is seen within the posterior tibial plateau. The bone marrow signal is heterogeneous likely reflecting marrow reconversion.      There is trace pes anserine bursitis. The tendons are otherwise normal.      Grade 2 fatty streaking of the semimembranosus and vastus lateralis muscles are noted. There is mild prepatellar edema.      MRI Right Shoulder 7/21/2017 (in media):   There is a complete tear of the supraspinatus tendon with retraction to the glenohumeral joint. There is partial tearing of the anterior infraspinatus articular surface. Mild subscapularis tendinosis is noted. The teres minor is intact. A small  amount of subacromial/subdeltoid fluid is noted. The proximal long head biceps tendon is poorly visualized proximally although intact distally.      Degenerative tearing of the anterosuperior through posterosuperior labrum is noted. There is moderate to severe superior glenoid cartilage thinning with subchondral degenerative changes. The glenohumeral ligaments appear grossly intact.      An os acromiale is seen with fluid and osteophytosis at its junction with the base of the acromion. There is moderate superior subluxation of the AC joint with mild osteophytosis.      Small effusion with subcoracoid extension is noted.      There is grade 2/3 fatty atrophy of the supraspinatus muscle, grade 2 of the infraspinatus and subscapularis. Heterogeneous signal is seen throughout the bone marrow likely reflecting marrow reconversion.        Allergies: Review of patient's allergies indicates:  No Known Allergies    Current Medications:   Current Outpatient Medications   Medication Sig Dispense Refill    acetaminophen (TYLENOL) 500 MG tablet       amLODIPine (NORVASC) 10 MG tablet TAKE 1 TABLET ONE TIME DAILY 90 tablet 1    atorvastatin (LIPITOR) 20 MG tablet TAKE 1 TABLET EVERY DAY 90 tablet 11    B-complex with vitamin C (Z-BEC OR EQUIV) tablet Take 1 tablet by mouth once daily.       CALCIUM CITRATE-VITAMIN D2 ORAL Take 1 tablet by mouth once daily.       citalopram (CELEXA) 10 MG tablet Take 1 tablet (10 mg total) by mouth once daily. 90 tablet 3    ELIQUIS 5 mg Tab TAKE 1 TABLET TWICE DAILY 180 tablet 2    ferrous gluconate (FERGON) 324 MG tablet Take 1 tablet (324 mg total) by mouth daily with breakfast. 90 tablet 11    furosemide (LASIX) 80 MG tablet Take 1 tablet (80 mg total) by mouth 2 (two) times a day. 60 tablet 11    gabapentin (NEURONTIN) 300 MG capsule Take 2 capsules (600 mg total) by mouth 3 (three) times daily. 540 capsule 2    lactulose (CHRONULAC) 10 gram/15 mL solution Take 30 mLs (20 g total)  by mouth 2 (two) times daily as needed (constipation). 120 mL 1    LIDOcaine-prilocaine (EMLA) cream Apply topically as needed (prior to port access). 30 g 0    lisinopriL (PRINIVIL,ZESTRIL) 40 MG tablet TAKE 1 TABLET ONE TIME DAILY 90 tablet 1    multivitamin with minerals tablet Take 1 tablet by mouth once daily.       oxyCODONE-acetaminophen (PERCOCET) 5-325 mg per tablet Take 1 tablet by mouth every 8 (eight) hours as needed for Pain. 90 tablet 0    OZEMPIC 1 mg/dose (4 mg/3 mL) INJECT 1 MG INTO THE SKIN EVERY 7 DAYS. 9 pen 11    pantoprazole (PROTONIX) 40 MG tablet Take 1 tablet (40 mg total) by mouth Daily. 90 tablet 3     No current facility-administered medications for this visit.     Facility-Administered Medications Ordered in Other Visits   Medication Dose Route Frequency Provider Last Rate Last Admin    0.9%  NaCl infusion   Intravenous Continuous Tevin Clark MD   Stopped at 01/13/22 1055       REVIEW OF SYSTEMS:    GENERAL:  No weight loss, malaise or fevers.  HEENT:  Negative for frequent or significant headaches.  NECK:  Negative for lumps, goiter, pain and significant neck swelling.  RESPIRATORY:  Negative for cough, wheezing or shortness of breath. +LAURA  CARDIOVASCULAR:  Negative for chest pain, leg swelling or palpitations. HTN  GI:  Negative for abdominal discomfort, blood in stools or black stools or change in bowel habits. +GERD, gastric cancer actively in chemo  MUSCULOSKELETAL:  See HPI  SKIN:  Negative for lesions, rash, and itching.  PSYCH:  Positive for  mood disorder and recent psychosocial stressors. +sleep disturbance  HEMATOLOGY/LYMPHOLOGY:  Negative for prolonged bleeding, bruising easily or swollen nodes.  NEURO:   No history of headaches, syncope, paralysis, seizures or tremors.  ENDO: Thyroid disorder.   All other reviewed and negative other than HPI.    Past Medical History:  Past Medical History:   Diagnosis Date    YOUNG (acute kidney injury) 10/15/2021    Anemia      2018    Arthritis     BMI 60.0-69.9, adult     Cataract     Chronic diastolic congestive heart failure 1/27/2021    Depression     Dry eye syndrome     Gastric adenocarcinoma 5/25/2021    GERD (gastroesophageal reflux disease)     Glaucoma suspect     History of gastric ulcer     History of psychiatric hospitalization     Hx of psychiatric care     Celexa, no recall of charted Effexor, Lexapro, Desyrel prescriptions    Hyperlipidemia     Hypertension     Hypothyroidism     Morbid obesity     Neuromuscular disorder     Sleep apnea     Thyroid disease        Past Surgical History:  Past Surgical History:   Procedure Laterality Date    APPENDECTOMY      CARDIAC SURGERY      CATARACT EXTRACTION W/  INTRAOCULAR LENS IMPLANT Bilateral     MFIOL OU    CORONARY ANGIOGRAPHY Bilateral 2/24/2021    Procedure: ANGIOGRAM, CORONARY ARTERY;  Surgeon: Cresencio Ridley MD;  Location: [facility] CATH LAB;  Service: Cardiology;  Laterality: Bilateral;  Covid test needed - ok per Danay SSCU. Pt. w/o transportation or family    ENDOSCOPIC ULTRASOUND OF UPPER GASTROINTESTINAL TRACT N/A 3/17/2021    Procedure: ULTRASOUND, UPPER GI TRACT, ENDOSCOPIC;  Surgeon: Gerald Anaya MD;  Location: [facility] (2ND FLR);  Service: Endoscopy;  Laterality: N/A;  Pt unable to get a ride for swab. Will do rapid test at 8:30 - ttr    ENDOSCOPIC ULTRASOUND OF UPPER GASTROINTESTINAL TRACT N/A 1/13/2022    Procedure: ULTRASOUND, UPPER GI TRACT, ENDOSCOPIC;  Surgeon: Kevin Carballo MD;  Location: [facility] (2ND FLR);  Service: Endoscopy;  Laterality: N/A;  blood thinner approval received, see telephone encounter 1/7/22-BB  rapid  instructions given verbally and sent to address on file in pt's chart-BB    ESOPHAGOGASTRODUODENOSCOPY N/A 2/5/2021    Procedure: EGD (ESOPHAGOGASTRODUODENOSCOPY);  Surgeon: Matthew Hernadez MD;  Location: [facility] ENDO (2ND FLR);  Service: Endoscopy;  Laterality: N/A;  BMI-58    Wt: 361#     COVID test at  [facility] on 2/2-GT    ESOPHAGOGASTRODUODENOSCOPY N/A 3/17/2021    Procedure: EGD (ESOPHAGOGASTRODUODENOSCOPY);  Surgeon: Gerald Anaya MD;  Location: [facility] ENDO (2ND FLR);  Service: Endoscopy;  Laterality: N/A;    ESOPHAGOGASTRODUODENOSCOPY N/A 5/25/2021    Procedure: EGD (ESOPHAGOGASTRODUODENOSCOPY);  Surgeon: Kevin Carballo MD;  Location: [facility] ENDO (2ND FLR);  Service: Endoscopy;  Laterality: N/A;  ESD 2 hours  Full COVID vaccination on file. ttr    ESOPHAGOGASTRODUODENOSCOPY N/A 1/13/2022    Procedure: EGD (ESOPHAGOGASTRODUODENOSCOPY);  Surgeon: Kevin Carballo MD;  Location: [facility] ENDO (2ND FLR);  Service: Endoscopy;  Laterality: N/A;  blood thinner approval, see telephone encounter 1/7/22-BB  rapid  instructions given verbally and sent to address on file in pt's chart-BB    EYE SURGERY      INJECTION OF ANESTHETIC AGENT AROUND NERVE Left 7/10/2018    Procedure: BLOCK, NERVE;  Surgeon: Samantha Vidal MD;  Location: [facility] PAIN MGT;  Service: Pain Management;  Laterality: Left;  Genicular     INJECTION OF ANESTHETIC AGENT AROUND NERVE Left 7/19/2019    Procedure: Block, Nerve NERVE BLOCK GENICULAR WITH PHENOL 6%;  Surgeon: Samantha Vidal MD;  Location: [facility] PAIN MGT;  Service: Pain Management;  Laterality: Left;  NEEDS CONSENT    INSERTION OF TUNNELED CENTRAL VENOUS CATHETER (CVC) WITH SUBCUTANEOUS PORT N/A 7/9/2021    Procedure: INSERTION, PORT-A-CATH;  Surgeon: Mario Paz MD;  Location: [facility] CATH LAB;  Service: Radiology;  Laterality: N/A;  PORT PLACEMENT    RADIOFREQUENCY ABLATION Left 6/19/2020    Procedure: RADIOFREQUENCY ABLATION LEFT GENICULAR;  Surgeon: Tevin Clark MD;  Location: [facility] PAIN MGT;  Service: Pain Management;  Laterality: Left;  Left Genicular RFA    RADIOFREQUENCY ABLATION Left 1/22/2021    Procedure: RADIOFREQUENCY ABLATION LEFT GENICULAR;  Surgeon: Tevin Clark MD;  Location: [facility] PAIN MGT;  Service: Pain Management;  Laterality: Left;    RADIOFREQUENCY ABLATION  "Left 7/30/2021    Procedure: RADIOFREQUENCY ABLATION, GENICULAR COOLED NEED CONSENT;  Surgeon: Tevin Clark MD;  Location: [facility] PAIN MGT;  Service: Pain Management;  Laterality: Left;    RADIOFREQUENCY ABLATION Left 4/22/2022    Procedure: RADIOFREQUENCY ABLATION, GENICULAR COOLED , LEFT;  Surgeon: Tevin Clark MD;  Location: [facility] PAIN MGT;  Service: Pain Management;  Laterality: Left;    TONSILLECTOMY         Family History:  Family History   Problem Relation Age of Onset    No Known Problems Mother     No Known Problems Father     Colon cancer Neg Hx     Esophageal cancer Neg Hx        Social History:  Social History     Socioeconomic History    Marital status:     Number of children: 2   Occupational History     Comment: retired  and cook   Tobacco Use    Smoking status: Current Some Day Smoker     Years: 50.00     Types: Cigarettes    Smokeless tobacco: Never Used    Tobacco comment: currently smoking <5 cigarettes most days   Substance and Sexual Activity    Alcohol use: Yes     Comment: raely    Drug use: No    Sexual activity: Not Currently     Partners: Male   Social History Narrative    2 children (dtr in area, son in [place]) and she has 3 grandkids (in [place]),    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    Originally from [place]       OBJECTIVE:    /67 (BP Location: Left arm, Patient Position: Sitting, BP Method: Medium (Automatic))   Pulse 75   Temp 98 °F (36.7 °C)   Ht 5' 6" (1.676 m)   Wt 121.1 kg (266 lb 15.6 oz)   BMI 43.09 kg/m²     PHYSICAL EXAMINATION:    General appearance: Well appearing, in no acute distress, alert and oriented x3.  Psych:  Mood and affect appropriate.  Skin: Skin color, texture, turgor normal, no rashes or lesions, in both upper and lower body.  Head/face:  Atraumatic, normocephalic.   Pulm: Symmetric chest rise, no respiratory distress noted.   Extremities:  No deformities or skin discoloration. Good capillary refill. +2 " pitting edema to bilateral feet.   Musculoskeletal: There is mild pain with palpation over medial and lateral joint line of left knee. Limited ROM with mild pain on extension of left knee. Crepitus noted to left knee. Right knee maneuvers are negative. No atrophy or tone abnormalities are noted.  Neuro:  Plantar response are downgoing. No loss of sensation is noted.  Gait: Antalgic- ambulates with wheelchair.     ASSESSMENT: 71 y.o. year old female with left knee pain, consistent with the followin. Chronic pain of left knee     2. Primary osteoarthritis of left knee     3. Gastric adenocarcinoma     4. Chronic pain disorder     5. Encounter for monitoring opioid maintenance therapy  Pain Clinic Drug Screen         PLAN:     - Previous imaging reviewed today. Labs reviewed.     - We can repeat left genicular RFA as needed.    - We can repeat left knee Monovisc injection as needed.     - Continue follow up with Oncology.     - Pain contract signed 2020.    - Continue Percocet 5/325 mg TID PRN. Refill sent with appropriate date.     - The patient is here today for a refill of current pain medications and they believe these provide effective pain control and improvements in quality of life.  The patient notes no serious side effects, and feels the benefits outweigh the risks.  The patient was reminded of the pain contract that they signed previously as well as the risks and benefits of the medication including possible death.  The updated [place] Board [employer] prescription monitoring program was reviewed, and the patient has been found to be compliant with current treatment plan.    - Previous serum drug of abuse reviewed. Repeat UDS today.     - Continue Gabapentin.     - I have stressed the importance of physical activity and a home exercise plan to help with pain and improve health.    - RTC in 3 months or sooner if needed.     - Dr. Clark was consulted on the patient and agrees with this  plan.    The above plan and management options were discussed at length with patient. Patient is in agreement with the above and verbalized understanding.    Savanna Hyatt NP  08/16/2022

## 2022-08-22 LAB
6MAM UR QL: NOT DETECTED
7AMINOCLONAZEPAM UR QL: NOT DETECTED
A-OH ALPRAZ UR QL: NOT DETECTED
ALPHA-OH-MIDAZOLAM: NOT DETECTED
ALPRAZ UR QL: NOT DETECTED
AMPHET UR QL SCN: NOT DETECTED
ANNOTATION COMMENT IMP: NORMAL
ANNOTATION COMMENT IMP: NORMAL
BARBITURATES UR QL: NOT DETECTED
BUPRENORPHINE UR QL: NOT DETECTED
BZE UR QL: NOT DETECTED
CARBOXYTHC UR QL: NOT DETECTED
CARISOPRODOL UR QL: NOT DETECTED
CLONAZEPAM UR QL: NOT DETECTED
CODEINE UR QL: NOT DETECTED
CREAT UR-MCNC: 205.6 MG/DL (ref 20–400)
DIAZEPAM UR QL: NOT DETECTED
ETHYL GLUCURONIDE UR QL: PRESENT
FENTANYL UR QL: NOT DETECTED
GABAPENTIN: PRESENT
HYDROCODONE UR QL: NOT DETECTED
HYDROMORPHONE UR QL: NOT DETECTED
LORAZEPAM UR QL: NOT DETECTED
MDA UR QL: NOT DETECTED
MDEA UR QL: NOT DETECTED
MDMA UR QL: NOT DETECTED
ME-PHENIDATE UR QL: NOT DETECTED
METHADONE UR QL: NOT DETECTED
METHAMPHET UR QL: NOT DETECTED
MIDAZOLAM UR QL SCN: NOT DETECTED
MORPHINE UR QL: NOT DETECTED
NALOXONE: NOT DETECTED
NORBUPRENORPHINE UR QL CFM: NOT DETECTED
NORDIAZEPAM UR QL: NOT DETECTED
NORFENTANYL UR QL: NOT DETECTED
NORHYDROCODONE UR QL CFM: NOT DETECTED
NORMEPERIDINE UR QL CFM: NOT DETECTED
NOROXYCODONE UR QL CFM: PRESENT
NOROXYMORPHONE UR QL SCN: PRESENT
OXAZEPAM UR QL: NOT DETECTED
OXYCODONE UR QL: PRESENT
OXYMORPHONE UR QL: PRESENT
PATHOLOGY STUDY: NORMAL
PCP UR QL: NOT DETECTED
PHENTERMINE UR QL: NOT DETECTED
PREGABALIN: NOT DETECTED
SERVICE CMNT-IMP: NORMAL
TAPENTADOL UR QL SCN: NOT DETECTED
TAPENTADOL UR QL SCN: NOT DETECTED
TEMAZEPAM UR QL: NOT DETECTED
TRAMADOL UR QL: NOT DETECTED
ZOLPIDEM METABOLITE: NOT DETECTED
ZOLPIDEM UR QL: NOT DETECTED

## 2022-08-23 ENCOUNTER — TELEPHONE (OUTPATIENT)
Dept: PAIN MEDICINE | Facility: CLINIC | Age: 72
End: 2022-08-23
Payer: MEDICARE

## 2022-08-23 DIAGNOSIS — M17.12 PRIMARY OSTEOARTHRITIS OF LEFT KNEE: ICD-10-CM

## 2022-08-23 DIAGNOSIS — G89.29 CHRONIC PAIN OF LEFT KNEE: ICD-10-CM

## 2022-08-23 DIAGNOSIS — G89.4 CHRONIC PAIN DISORDER: ICD-10-CM

## 2022-08-23 DIAGNOSIS — M25.562 CHRONIC PAIN OF LEFT KNEE: ICD-10-CM

## 2022-08-23 RX ORDER — GABAPENTIN 300 MG/1
600 CAPSULE ORAL 3 TIMES DAILY
Qty: 540 CAPSULE | Refills: 2 | Status: SHIPPED | OUTPATIENT
Start: 2022-08-23 | End: 2022-11-16 | Stop reason: SDUPTHER

## 2022-08-23 NOTE — TELEPHONE ENCOUNTER
Staff called pt to inform her that it was to early to receive her prescription. Staff stated to pt that she can call next week to get a refill. Pt verbalized understanding and confirmed SG        ----- Message from Crys Gonzalez sent at 8/23/2022  1:18 PM CDT -----  Regarding: refill  Can the clinic reply in [facility]:       Please refill the medication(s) listed below. Please call the patient when the prescription(s) is ready for  at this phone number         Medication #1 gabapentin (NEURONTIN) 300 MG capsule    Medication #2       Preferred Pharmacy: [facility] (NOW [facility]) - [facility] [address]

## 2022-08-29 ENCOUNTER — TELEPHONE (OUTPATIENT)
Dept: HEMATOLOGY/ONCOLOGY | Facility: CLINIC | Age: 72
End: 2022-08-29
Payer: MEDICARE

## 2022-08-29 NOTE — TELEPHONE ENCOUNTER
"----- Message from Marjorie Verma sent at 8/29/2022  8:18 AM CDT -----  Regarding: speak with office  Contact: laura  Consult/Advisory:           Name Of Caller:Laura    Contact Preference?:[phone] (home)                Does patient feel the need to be seen today?no            What is the nature of the call?: pt is calling to speak with office urgently           Additional Notes:  "Thank you for all that you do for our patients'"     "

## 2022-08-29 NOTE — TELEPHONE ENCOUNTER
Returned call and spoke with Ms Tejedajonatanon. Ms Barallon calling to request to be scheduled for an appointment with Dr Pelaez. She states she is concerned about the area she previously had her breast abscess. The area tender to the touch. An appointment has been scheduled on 8/30/2022, in clinic visit with Dr Pelaez.

## 2022-08-30 ENCOUNTER — TELEPHONE (OUTPATIENT)
Dept: OPHTHALMOLOGY | Facility: CLINIC | Age: 72
End: 2022-08-30
Payer: MEDICARE

## 2022-08-30 ENCOUNTER — INFUSION (OUTPATIENT)
Dept: INFUSION THERAPY | Facility: OTHER | Age: 72
End: 2022-08-30
Attending: STUDENT IN AN ORGANIZED HEALTH CARE EDUCATION/TRAINING PROGRAM
Payer: MEDICARE

## 2022-08-30 ENCOUNTER — OFFICE VISIT (OUTPATIENT)
Dept: HEMATOLOGY/ONCOLOGY | Facility: CLINIC | Age: 72
End: 2022-08-30
Payer: MEDICARE

## 2022-08-30 VITALS
TEMPERATURE: 98 F | DIASTOLIC BLOOD PRESSURE: 57 MMHG | RESPIRATION RATE: 18 BRPM | SYSTOLIC BLOOD PRESSURE: 120 MMHG | HEIGHT: 66 IN | BODY MASS INDEX: 43.36 KG/M2 | WEIGHT: 269.81 LBS | OXYGEN SATURATION: 97 % | HEART RATE: 68 BPM

## 2022-08-30 DIAGNOSIS — D84.81 IMMUNODEFICIENCY DUE TO CONDITIONS CLASSIFIED ELSEWHERE: ICD-10-CM

## 2022-08-30 DIAGNOSIS — N64.4 BREAST PAIN IN FEMALE: ICD-10-CM

## 2022-08-30 DIAGNOSIS — C16.9 GASTRIC ADENOCARCINOMA: Primary | ICD-10-CM

## 2022-08-30 DIAGNOSIS — I50.32 CHRONIC DIASTOLIC CONGESTIVE HEART FAILURE: ICD-10-CM

## 2022-08-30 DIAGNOSIS — E66.01 MORBID OBESITY: ICD-10-CM

## 2022-08-30 DIAGNOSIS — H11.31 SUBCONJUNCTIVAL HEMORRHAGE OF RIGHT EYE: ICD-10-CM

## 2022-08-30 DIAGNOSIS — N61.0 MASTITIS IN FEMALE: ICD-10-CM

## 2022-08-30 DIAGNOSIS — Z86.718 HISTORY OF DVT (DEEP VEIN THROMBOSIS): ICD-10-CM

## 2022-08-30 PROCEDURE — 1101F PR PT FALLS ASSESS DOC 0-1 FALLS W/OUT INJ PAST YR: ICD-10-PCS | Mod: CPTII,S$GLB,, | Performed by: STUDENT IN AN ORGANIZED HEALTH CARE EDUCATION/TRAINING PROGRAM

## 2022-08-30 PROCEDURE — 1125F PR PAIN SEVERITY QUANTIFIED, PAIN PRESENT: ICD-10-PCS | Mod: CPTII,S$GLB,, | Performed by: STUDENT IN AN ORGANIZED HEALTH CARE EDUCATION/TRAINING PROGRAM

## 2022-08-30 PROCEDURE — 3288F PR FALLS RISK ASSESSMENT DOCUMENTED: ICD-10-PCS | Mod: CPTII,S$GLB,, | Performed by: STUDENT IN AN ORGANIZED HEALTH CARE EDUCATION/TRAINING PROGRAM

## 2022-08-30 PROCEDURE — 3074F PR MOST RECENT SYSTOLIC BLOOD PRESSURE < 130 MM HG: ICD-10-PCS | Mod: CPTII,S$GLB,, | Performed by: STUDENT IN AN ORGANIZED HEALTH CARE EDUCATION/TRAINING PROGRAM

## 2022-08-30 PROCEDURE — 99213 OFFICE O/P EST LOW 20 MIN: CPT | Mod: PBBFAC | Performed by: STUDENT IN AN ORGANIZED HEALTH CARE EDUCATION/TRAINING PROGRAM

## 2022-08-30 PROCEDURE — 1160F PR REVIEW ALL MEDS BY PRESCRIBER/CLIN PHARMACIST DOCUMENTED: ICD-10-PCS | Mod: CPTII,S$GLB,, | Performed by: STUDENT IN AN ORGANIZED HEALTH CARE EDUCATION/TRAINING PROGRAM

## 2022-08-30 PROCEDURE — 99499 RISK ADDL DX/OHS AUDIT: ICD-10-PCS | Mod: S$GLB,,, | Performed by: STUDENT IN AN ORGANIZED HEALTH CARE EDUCATION/TRAINING PROGRAM

## 2022-08-30 PROCEDURE — 25000003 PHARM REV CODE 250: Performed by: STUDENT IN AN ORGANIZED HEALTH CARE EDUCATION/TRAINING PROGRAM

## 2022-08-30 PROCEDURE — 1159F PR MEDICATION LIST DOCUMENTED IN MEDICAL RECORD: ICD-10-PCS | Mod: CPTII,S$GLB,, | Performed by: STUDENT IN AN ORGANIZED HEALTH CARE EDUCATION/TRAINING PROGRAM

## 2022-08-30 PROCEDURE — 99999 PR PBB SHADOW E&M-EST. PATIENT-LVL III: ICD-10-PCS | Mod: PBBFAC,,, | Performed by: STUDENT IN AN ORGANIZED HEALTH CARE EDUCATION/TRAINING PROGRAM

## 2022-08-30 PROCEDURE — 3074F SYST BP LT 130 MM HG: CPT | Mod: CPTII,S$GLB,, | Performed by: STUDENT IN AN ORGANIZED HEALTH CARE EDUCATION/TRAINING PROGRAM

## 2022-08-30 PROCEDURE — 1101F PT FALLS ASSESS-DOCD LE1/YR: CPT | Mod: CPTII,S$GLB,, | Performed by: STUDENT IN AN ORGANIZED HEALTH CARE EDUCATION/TRAINING PROGRAM

## 2022-08-30 PROCEDURE — 1159F MED LIST DOCD IN RCRD: CPT | Mod: CPTII,S$GLB,, | Performed by: STUDENT IN AN ORGANIZED HEALTH CARE EDUCATION/TRAINING PROGRAM

## 2022-08-30 PROCEDURE — 3008F BODY MASS INDEX DOCD: CPT | Mod: CPTII,S$GLB,, | Performed by: STUDENT IN AN ORGANIZED HEALTH CARE EDUCATION/TRAINING PROGRAM

## 2022-08-30 PROCEDURE — 1160F RVW MEDS BY RX/DR IN RCRD: CPT | Mod: CPTII,S$GLB,, | Performed by: STUDENT IN AN ORGANIZED HEALTH CARE EDUCATION/TRAINING PROGRAM

## 2022-08-30 PROCEDURE — 99999 PR PBB SHADOW E&M-EST. PATIENT-LVL III: CPT | Mod: PBBFAC,,, | Performed by: STUDENT IN AN ORGANIZED HEALTH CARE EDUCATION/TRAINING PROGRAM

## 2022-08-30 PROCEDURE — 3008F PR BODY MASS INDEX (BMI) DOCUMENTED: ICD-10-PCS | Mod: CPTII,S$GLB,, | Performed by: STUDENT IN AN ORGANIZED HEALTH CARE EDUCATION/TRAINING PROGRAM

## 2022-08-30 PROCEDURE — 99499 UNLISTED E&M SERVICE: CPT | Mod: S$GLB,,, | Performed by: STUDENT IN AN ORGANIZED HEALTH CARE EDUCATION/TRAINING PROGRAM

## 2022-08-30 PROCEDURE — 99215 PR OFFICE/OUTPT VISIT, EST, LEVL V, 40-54 MIN: ICD-10-PCS | Mod: S$GLB,,, | Performed by: STUDENT IN AN ORGANIZED HEALTH CARE EDUCATION/TRAINING PROGRAM

## 2022-08-30 PROCEDURE — 96523 IRRIG DRUG DELIVERY DEVICE: CPT

## 2022-08-30 PROCEDURE — 3078F PR MOST RECENT DIASTOLIC BLOOD PRESSURE < 80 MM HG: ICD-10-PCS | Mod: CPTII,S$GLB,, | Performed by: STUDENT IN AN ORGANIZED HEALTH CARE EDUCATION/TRAINING PROGRAM

## 2022-08-30 PROCEDURE — 63600175 PHARM REV CODE 636 W HCPCS: Performed by: STUDENT IN AN ORGANIZED HEALTH CARE EDUCATION/TRAINING PROGRAM

## 2022-08-30 PROCEDURE — 99215 OFFICE O/P EST HI 40 MIN: CPT | Mod: S$GLB,,, | Performed by: STUDENT IN AN ORGANIZED HEALTH CARE EDUCATION/TRAINING PROGRAM

## 2022-08-30 PROCEDURE — 3078F DIAST BP <80 MM HG: CPT | Mod: CPTII,S$GLB,, | Performed by: STUDENT IN AN ORGANIZED HEALTH CARE EDUCATION/TRAINING PROGRAM

## 2022-08-30 PROCEDURE — A4216 STERILE WATER/SALINE, 10 ML: HCPCS | Performed by: STUDENT IN AN ORGANIZED HEALTH CARE EDUCATION/TRAINING PROGRAM

## 2022-08-30 PROCEDURE — 3288F FALL RISK ASSESSMENT DOCD: CPT | Mod: CPTII,S$GLB,, | Performed by: STUDENT IN AN ORGANIZED HEALTH CARE EDUCATION/TRAINING PROGRAM

## 2022-08-30 PROCEDURE — 1125F AMNT PAIN NOTED PAIN PRSNT: CPT | Mod: CPTII,S$GLB,, | Performed by: STUDENT IN AN ORGANIZED HEALTH CARE EDUCATION/TRAINING PROGRAM

## 2022-08-30 PROCEDURE — 4010F PR ACE/ARB THEARPY RXD/TAKEN: ICD-10-PCS | Mod: CPTII,S$GLB,, | Performed by: STUDENT IN AN ORGANIZED HEALTH CARE EDUCATION/TRAINING PROGRAM

## 2022-08-30 PROCEDURE — 4010F ACE/ARB THERAPY RXD/TAKEN: CPT | Mod: CPTII,S$GLB,, | Performed by: STUDENT IN AN ORGANIZED HEALTH CARE EDUCATION/TRAINING PROGRAM

## 2022-08-30 RX ORDER — HEPARIN 100 UNIT/ML
500 SYRINGE INTRAVENOUS
Status: CANCELLED | OUTPATIENT
Start: 2022-08-30

## 2022-08-30 RX ORDER — IBUPROFEN 600 MG/1
600 TABLET ORAL EVERY 8 HOURS PRN
Qty: 10 TABLET | Refills: 0 | Status: SHIPPED | OUTPATIENT
Start: 2022-08-30 | End: 2024-01-04 | Stop reason: CLARIF

## 2022-08-30 RX ORDER — CEPHALEXIN 500 MG/1
500 CAPSULE ORAL 4 TIMES DAILY
Qty: 40 CAPSULE | Refills: 0 | Status: SHIPPED | OUTPATIENT
Start: 2022-08-30 | End: 2022-09-09

## 2022-08-30 RX ORDER — SODIUM CHLORIDE 0.9 % (FLUSH) 0.9 %
10 SYRINGE (ML) INJECTION
Status: CANCELLED | OUTPATIENT
Start: 2022-08-30

## 2022-08-30 RX ORDER — SODIUM CHLORIDE 0.9 % (FLUSH) 0.9 %
10 SYRINGE (ML) INJECTION
Status: DISCONTINUED | OUTPATIENT
Start: 2022-08-30 | End: 2022-08-30 | Stop reason: HOSPADM

## 2022-08-30 RX ORDER — HEPARIN 100 UNIT/ML
500 SYRINGE INTRAVENOUS
Status: DISCONTINUED | OUTPATIENT
Start: 2022-08-30 | End: 2022-08-30 | Stop reason: HOSPADM

## 2022-08-30 RX ADMIN — HEPARIN 500 UNITS: 100 SYRINGE at 10:08

## 2022-08-30 RX ADMIN — SODIUM CHLORIDE, PRESERVATIVE FREE 10 ML: 5 INJECTION INTRAVENOUS at 10:08

## 2022-08-30 NOTE — TELEPHONE ENCOUNTER
----- Message from Renee Okeefe sent at 8/30/2022 12:17 PM CDT -----  Patient is returning phone call about red eyes.  Please contact patient to schedule at [phone]

## 2022-08-30 NOTE — PROGRESS NOTES
Hematology- Oncology Clinic Note :      8/30/2022    RFV / chief complaint- Gastric Cancer        HPI  Pt is a 71 y.o. 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   Pt presents to the clinic today for gastric cancer    C/o pain her R breast . No skin breakdown. No fever.   Redness in R eye.   No brb in stool. No melena. No abd pain.     Advised to quit smoking    Oncology presentation/ HPI  Pt was being evaluated for gastric bypass surgery   Feb 2021 EGD- Gastric tumor in the prepyloric region of the stomach,  March 2021 EUS- Gastric tumor in the prepyloric region of the stomach.  Both above biopsies did not show malignancy  5/25/21 EGD- Gastric tumor on the posterior wall of the gastric antrum. Complete removal was accomplished.         Reviewed past medical/surgical/social history    Past Medical History:   Diagnosis Date    YOUNG (acute kidney injury) 10/15/2021    Anemia     2018    Arthritis     BMI 60.0-69.9, adult     Cataract     Chronic diastolic congestive heart failure 1/27/2021    Depression     Dry eye syndrome     Gastric adenocarcinoma 5/25/2021    GERD (gastroesophageal reflux disease)     Glaucoma suspect     History of gastric ulcer     History of psychiatric hospitalization     Hx of psychiatric care     Celexa, no recall of charted Effexor, Lexapro, Desyrel prescriptions    Hyperlipidemia     Hypertension     Hypothyroidism     Morbid obesity     Neuromuscular disorder     Sleep apnea     Thyroid disease       Past Surgical History:    Procedure Laterality Date    APPENDECTOMY      CARDIAC SURGERY      CATARACT EXTRACTION W/  INTRAOCULAR LENS IMPLANT Bilateral     MFIOL OU    CORONARY ANGIOGRAPHY Bilateral 2/24/2021    Procedure: ANGIOGRAM, CORONARY ARTERY;  Surgeon: Cresencio Ridley MD;  Location: [facility] CATH LAB;  Service: Cardiology;  Laterality: Bilateral;  Covid test needed - ok per Danay SSCU. Pt. w/o transportation or family    ENDOSCOPIC ULTRASOUND OF UPPER GASTROINTESTINAL TRACT N/A 3/17/2021    Procedure: ULTRASOUND, UPPER GI TRACT, ENDOSCOPIC;  Surgeon: Gerald Anaya MD;  Location: [facility] (2ND FLR);  Service: Endoscopy;  Laterality: N/A;  Pt unable to get a ride for swab. Will do rapid test at 8:30 - ttr    ENDOSCOPIC ULTRASOUND OF UPPER GASTROINTESTINAL TRACT N/A 1/13/2022    Procedure: ULTRASOUND, UPPER GI TRACT, ENDOSCOPIC;  Surgeon: Kevin Carballo MD;  Location: [facility] (2ND FLR);  Service: Endoscopy;  Laterality: N/A;  blood thinner approval received, see telephone encounter 1/7/22-BB  rapid  instructions given verbally and sent to address on file in pt's chart-BB    ESOPHAGOGASTRODUODENOSCOPY N/A 2/5/2021    Procedure: EGD (ESOPHAGOGASTRODUODENOSCOPY);  Surgeon: Matthew Hernadez MD;  Location: [facility] (2ND FLR);  Service: Endoscopy;  Laterality: N/A;  BMI-58    Wt: 361#     COVID test at [facility] on 2/2-GT    ESOPHAGOGASTRODUODENOSCOPY N/A 3/17/2021    Procedure: EGD (ESOPHAGOGASTRODUODENOSCOPY);  Surgeon: Gerald Anaya MD;  Location: [facility] (2ND FLR);  Service: Endoscopy;  Laterality: N/A;    ESOPHAGOGASTRODUODENOSCOPY N/A 5/25/2021    Procedure: EGD (ESOPHAGOGASTRODUODENOSCOPY);  Surgeon: Kevin Carballo MD;  Location: [facility] (2ND FLR);  Service: Endoscopy;  Laterality: N/A;  ESD 2 hours  Full COVID vaccination on file. ttr    ESOPHAGOGASTRODUODENOSCOPY N/A 1/13/2022    Procedure: EGD (ESOPHAGOGASTRODUODENOSCOPY);  Surgeon: Kevin Carballo MD;  Location: [facility] (2ND [facility]);  Service: Endoscopy;   Laterality: N/A;  blood thinner approval, see telephone encounter 1/7/22-BB  rapid  instructions given verbally and sent to address on file in pt's chart-BB    EYE SURGERY      INJECTION OF ANESTHETIC AGENT AROUND NERVE Left 7/10/2018    Procedure: BLOCK, NERVE;  Surgeon: Samantha Vidal MD;  Location: [facility] PAIN MGT;  Service: Pain Management;  Laterality: Left;  Genicular     INJECTION OF ANESTHETIC AGENT AROUND NERVE Left 7/19/2019    Procedure: Block, Nerve NERVE BLOCK GENICULAR WITH PHENOL 6%;  Surgeon: Samantha Vidal MD;  Location: [facility] PAIN MGT;  Service: Pain Management;  Laterality: Left;  NEEDS CONSENT    INSERTION OF TUNNELED CENTRAL VENOUS CATHETER (CVC) WITH SUBCUTANEOUS PORT N/A 7/9/2021    Procedure: INSERTION, PORT-A-CATH;  Surgeon: Mario Paz MD;  Location: [facility] CATH LAB;  Service: Radiology;  Laterality: N/A;  PORT PLACEMENT    RADIOFREQUENCY ABLATION Left 6/19/2020    Procedure: RADIOFREQUENCY ABLATION LEFT GENICULAR;  Surgeon: Tevin Clark MD;  Location: [facility] PAIN MGT;  Service: Pain Management;  Laterality: Left;  Left Genicular RFA    RADIOFREQUENCY ABLATION Left 1/22/2021    Procedure: RADIOFREQUENCY ABLATION LEFT GENICULAR;  Surgeon: Tevin Clark MD;  Location: [facility] PAIN MGT;  Service: Pain Management;  Laterality: Left;    RADIOFREQUENCY ABLATION Left 7/30/2021    Procedure: RADIOFREQUENCY ABLATION, GENICULAR COOLED NEED CONSENT;  Surgeon: Tevin Clark MD;  Location: [facility] PAIN MGT;  Service: Pain Management;  Laterality: Left;    RADIOFREQUENCY ABLATION Left 4/22/2022    Procedure: RADIOFREQUENCY ABLATION, GENICULAR COOLED , LEFT;  Surgeon: Tevin Clark MD;  Location: [facility] PAIN MGT;  Service: Pain Management;  Laterality: Left;    TONSILLECTOMY        (Not in a hospital admission)    Review of patient's allergies indicates:  No Known Allergies   Social History     Tobacco Use    Smoking status: Some Days     Years: 50.00     Types: Cigarettes    Smokeless tobacco:  "Never    Tobacco comments:     currently smoking <5 cigarettes most days   Substance Use Topics    Alcohol use: Yes     Comment: meg      Family History   Problem Relation Age of Onset    No Known Problems Mother     No Known Problems Father     Colon cancer Neg Hx     Esophageal cancer Neg Hx           Review of Systems :  Review of Systems   Constitutional:  Positive for malaise/fatigue. Negative for chills, diaphoresis, fever and weight loss.   HENT: Negative.  Negative for congestion, hearing loss, nosebleeds, sore throat and tinnitus.    Eyes:  Positive for redness. Negative for blurred vision and discharge.   Respiratory:  Negative for cough, hemoptysis, sputum production, shortness of breath and wheezing.    Cardiovascular:  Positive for chest pain. Negative for palpitations and leg swelling.   Gastrointestinal:  Negative for abdominal pain, blood in stool, constipation, diarrhea, heartburn, melena, nausea and vomiting.   Genitourinary: Negative.    Musculoskeletal:  Positive for joint pain. Negative for back pain, falls and myalgias.   Skin:  Negative for itching and rash.   Neurological:  Negative for dizziness, tingling, sensory change, speech change, focal weakness, seizures, loss of consciousness, weakness and headaches.   Endo/Heme/Allergies: Negative.  Does not bruise/bleed easily.   Psychiatric/Behavioral: Negative.  Negative for depression. The patient is not nervous/anxious and does not have insomnia.              Physical Exam :  BP (!) 120/57 (BP Location: Left arm, Patient Position: Sitting, BP Method: Medium (Automatic))   Pulse 68   Temp 98.4 °F (36.9 °C) (Oral)   Resp 18   Ht 5' 6" (1.676 m)   Wt 122.4 kg (269 lb 13.5 oz)   SpO2 97%   BMI 43.55 kg/m²   Wt Readings from Last 3 Encounters:   08/30/22 122.4 kg (269 lb 13.5 oz)   08/16/22 121.1 kg (266 lb 15.6 oz)   07/22/22 121 kg (266 lb 12.1 oz)       Body mass index is 43.55 kg/m².      Physical Exam  Vitals and nursing note reviewed. "   Constitutional:       General: She is not in acute distress.     Appearance: She is well-developed. She is not ill-appearing.   HENT:      Head: Normocephalic and atraumatic.      Right Ear: External ear normal.      Left Ear: External ear normal.      Mouth/Throat:      Pharynx: No oropharyngeal exudate.   Eyes:      General: No scleral icterus.     Conjunctiva/sclera:      Right eye: Hemorrhage present.   Neck:      Thyroid: No thyromegaly.      Trachea: No tracheal deviation.   Cardiovascular:      Rate and Rhythm: Normal rate and regular rhythm.      Heart sounds: Normal heart sounds. No murmur heard.  Pulmonary:      Effort: Pulmonary effort is normal. No respiratory distress.      Breath sounds: Normal breath sounds. No wheezing or rales.      Comments: Port SOI  Chest:   Breasts:     Right: Tenderness present. No swelling, nipple discharge or skin change.      Left: No swelling, nipple discharge, skin change or tenderness.       Abdominal:      General: Bowel sounds are normal. There is no distension (obese).      Palpations: Abdomen is soft. There is no mass.      Tenderness: There is no abdominal tenderness. There is no rebound.      Comments: Skin examined, no infection in pannus area.    Musculoskeletal:         General: No swelling or tenderness. Normal range of motion.      Cervical back: Normal range of motion and neck supple.   Lymphadenopathy:      Cervical: No cervical adenopathy.   Skin:     General: Skin is warm.      Findings: No erythema, rash or wound.   Neurological:      Mental Status: She is alert and oriented to person, place, and time.      Cranial Nerves: No cranial nerve deficit.      Gait: Gait abnormal (uses cane).   Psychiatric:         Behavior: Behavior normal.           Current Outpatient Medications   Medication Sig Dispense Refill    acetaminophen (TYLENOL) 500 MG tablet       amLODIPine (NORVASC) 10 MG tablet TAKE 1 TABLET ONE TIME DAILY 90 tablet 1    atorvastatin (LIPITOR)  20 MG tablet TAKE 1 TABLET EVERY DAY 90 tablet 11    B-complex with vitamin C (Z-BEC OR EQUIV) tablet Take 1 tablet by mouth once daily.       CALCIUM CITRATE-VITAMIN D2 ORAL Take 1 tablet by mouth once daily.       cephALEXin (KEFLEX) 500 MG capsule Take 1 capsule (500 mg total) by mouth 4 (four) times daily. for 10 days 40 capsule 0    citalopram (CELEXA) 10 MG tablet TAKE 1 TABLET EVERY DAY 90 tablet 1    dextran 70-hypromellose (TEARS) ophthalmic solution Place 1 drop into the right eye every 6 (six) hours. 30 mL 0    ELIQUIS 5 mg Tab TAKE 1 TABLET TWICE DAILY 180 tablet 2    ferrous gluconate (FERGON) 324 MG tablet Take 1 tablet (324 mg total) by mouth daily with breakfast. 90 tablet 11    furosemide (LASIX) 80 MG tablet Take 1 tablet (80 mg total) by mouth 2 (two) times a day. 60 tablet 11    gabapentin (NEURONTIN) 300 MG capsule Take 2 capsules (600 mg total) by mouth 3 (three) times daily. 540 capsule 2    ibuprofen (ADVIL,MOTRIN) 600 MG tablet Take 1 tablet (600 mg total) by mouth every 8 (eight) hours as needed for Pain. 10 tablet 0    lactulose (CHRONULAC) 10 gram/15 mL solution Take 30 mLs (20 g total) by mouth 2 (two) times daily as needed (constipation). 120 mL 1    LIDOcaine-prilocaine (EMLA) cream Apply topically as needed (prior to port access). 30 g 0    lisinopriL (PRINIVIL,ZESTRIL) 40 MG tablet TAKE 1 TABLET ONE TIME DAILY 90 tablet 1    multivitamin with minerals tablet Take 1 tablet by mouth once daily.       oxyCODONE-acetaminophen (PERCOCET) 5-325 mg per tablet Take 1 tablet by mouth every 8 (eight) hours as needed for Pain. 90 tablet 0    OZEMPIC 1 mg/dose (4 mg/3 mL) INJECT 1 MG INTO THE SKIN EVERY 7 DAYS. 9 pen 11    pantoprazole (PROTONIX) 40 MG tablet Take 1 tablet (40 mg total) by mouth Daily. 90 tablet 3     No current facility-administered medications for this visit.     Facility-Administered Medications Ordered in Other Visits   Medication Dose Route Frequency Provider Last Rate Last  Admin    0.9%  NaCl infusion   Intravenous Continuous Tevin Clark MD   Stopped at 01/13/22 1055       Pertinent Diagnostic studies:      No visits with results within 1 Week(s) from this visit.   Latest known visit with results is:   Office Visit on 08/16/2022   Component Date Value Ref Range Status    DRUGS EXPECTED 08/16/2022 See Interp   Final    PAIN MANAGEMENT DRUG PANEL INTERP 08/16/2022 See Note   Final    Comment: ____________________________________________________________  ____  NO DRUGS PROVIDED  ____________________________________________________________  ____  Please call [facility] [employer] Client Services at   [phone] for Medical Director interpretation if   applicable. Alternatively, please consider the TARGETED   DRUG PROF, MASS SPEC/EMIT, UR ([id number]) which does not   require medication information or provide compliance   interpretation.  ____________________________________________________________  ____  INTERPRETIVE INFORMATION: Targeted drug profile Interp  Interpretation depends on accuracy and completeness of   patient medication information submitted by client.      CODEINE 08/16/2022 Not Detected   Final    MORPHINE 08/16/2022 Not Detected   Final    6-ACETYLMORPHINE 08/16/2022 Not Detected   Final    OXYCODONE 08/16/2022 Present   Final    NOROYXCODONE 08/16/2022 Present   Final    OXYMORPHONE 08/16/2022 Present   Final    NOROXYMORPHONE 08/16/2022 Present   Final    HYDROCODONE 08/16/2022 Not Detected   Final    NORHYDROCODONE 08/16/2022 Not Detected   Final    HYDROMORPHONE 08/16/2022 Not Detected   Final    BUPRENORPHINE 08/16/2022 Not Detected   Final    NORUBPRENORPHINE 08/16/2022 Not Detected   Final    FENTANYL 08/16/2022 Not Detected   Final    NORFENTANYL 08/16/2022 Not Detected   Final    MEPERIDINE METABOLITE 08/16/2022 Not Detected   Final    TAPENTADOL 08/16/2022 Not Detected   Final    TAPENTADOL-O-SULF 08/16/2022 Not Detected   Final    METHADONE 08/16/2022 Not  Detected   Final    TRAMADOL 08/16/2022 Not Detected   Final    AMPHETAMINE 08/16/2022 Not Detected   Final    METHAMPHETAMINE 08/16/2022 Not Detected   Final    MDMA- ECSTASY 08/16/2022 Not Detected   Final    MDA 08/16/2022 Not Detected   Final    MDEA- Genoveva 08/16/2022 Not Detected   Final    METHYLPHENIDATE 08/16/2022 Not Detected   Final    PHENTERMINE 08/16/2022 Not Detected   Final    BENZOYLECGONINE 08/16/2022 Not Detected   Final    ALPRAZOLAM 08/16/2022 Not Detected   Final    ALPHA-OH-ALPRAZOLAM 08/16/2022 Not Detected   Final    CLONAZEPAM 08/16/2022 Not Detected   Final    7-AMINOCLONAZEPAM 08/16/2022 Not Detected   Final    DIAZEPAM 08/16/2022 Not Detected   Final    NORDIAZEPAM 08/16/2022 Not Detected   Final    OXAZEPAM 08/16/2022 Not Detected   Final    TEMAZEPAM 08/16/2022 Not Detected   Final    Lorazepam 08/16/2022 Not Detected   Final    MIDAZOLAM 08/16/2022 Not Detected   Final    ZOLPIDEM 08/16/2022 Not Detected   Final    BARBITURATES 08/16/2022 Not Detected   Final    Creatinine, Urine 08/16/2022 205.6  20.0 - 400.0 mg/dL Final    ETHYL GLUCURONIDE 08/16/2022 Present   Final    MARIJUANA METABOLITE 08/16/2022 Not Detected   Final    PCP 08/16/2022 Not Detected   Final    CARISOPRODOL 08/16/2022 Not Detected   Final    Comment: The carisoprodol immunoassay has cross-reactivity to   carisoprodol and meprobamate.      Naloxone 08/16/2022 Not Detected   Final    Gabapentin 08/16/2022 Present   Final    Pregabalin 08/16/2022 Not Detected   Final    Alpha-OH-Midazolam 08/16/2022 Not Detected   Final    Zolpidem Metabolite 08/16/2022 Not Detected   Final    PAIN MANAGEMENT DRUG PANEL 08/16/2022 See Below   Final    Comment: Methodology: Qualitative Enzyme Immunoassay and Qualitative   Liquid Chromatography-Tandem Mass Spectrometry,   Quantitative Spectrophotometry  The absence of expected drug(s) and/or drug metabolite(s)   may indicate non-compliance, inappropriate timing of   specimen collection  relative to drug administration, poor   drug absorption, diluted/adulterated urine, or limitations   of testing. The concentration must be greater than or equal   to the cutoff to be reported as present.  If specific drug   concentrations are required, contact the laboratory within   two weeks of specimen collection to request quantification   by a second analytical technique. Interpretive questions   should be directed to the laboratory.  Results based on immunoassay detection that do not match   clinical expectations should be  interpreted with caution. Confirmatory testing by mass   spectrometry for immunoassay-based results is available, if   ordered within two weeks of specimen collection. Additional   kulwinder                           ges apply.  For medical purposes only; not valid for forensic use.  This test was developed and its performance characteristics   determined by [facility]. It has not been cleared or   approved by the US Food and Drug Administration. This test   was performed in a CLIA certified laboratory and is   intended for clinical purposes.      EER PAIN MGT WATKINS,HIGH RES/EMIT, IN* 08/16/2022 See Note   Final    Comment: Authorized individuals can access the [employer]   Enhanced Report using the following link:     [email or web address]  Performed By: [facility]  [address] [place] [id number]  : Kaushal Dodd MD, PhD           Patient Active Problem List    Diagnosis Date Noted    Gastric adenocarcinoma 05/25/2021    Chronic diastolic congestive heart failure 01/27/2021    Class 3 severe obesity due to excess calories with serious comorbidity and body mass index (BMI) of 50.0 to 59.9 in adult 04/29/2019    Essential hypertension 04/29/2019    Left knee DJD 12/21/2018    Current mild episode of major depressive disorder without prior episode 03/07/2022    History of DVT (deep vein thrombosis) 10/15/2021    Chronic pain  07/30/2021    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    Abnormal cardiovascular stress test 02/24/2021    Tobacco abuse 01/28/2021    Pure hypercholesterolemia 01/27/2021    Gastroesophageal reflux disease 03/22/2020    Osteopenia of neck of left femur 01/14/2020    Left knee pain 07/19/2019    Hypothyroidism 07/18/2019    Debility     At high risk for injury related to fall     Gait instability 07/15/2019    Major depressive disorder 04/29/2019    Joint pain 04/29/2019    Chronic knee pain 11/30/2018    Obstructive sleep apnea 08/09/2018    Primary osteoarthritis of left knee 07/10/2018     Active Problem List with Overview Notes    Diagnosis Date Noted    Gastric adenocarcinoma 05/25/2021     gastric cancer cT2 or higher      Chronic diastolic congestive heart failure 01/27/2021    Class 3 severe obesity due to excess calories with serious comorbidity and body mass index (BMI) of 50.0 to 59.9 in adult 04/29/2019    Essential hypertension 04/29/2019    Left knee DJD 12/21/2018    Current mild episode of major depressive disorder without prior episode 03/07/2022    History of DVT (deep vein thrombosis) 10/15/2021    Chronic pain 07/30/2021    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    Abnormal cardiovascular stress test 02/24/2021    Tobacco abuse 01/28/2021    Pure hypercholesterolemia 01/27/2021    Gastroesophageal reflux disease 03/22/2020    Osteopenia of neck of left femur 01/14/2020    Left knee pain 07/19/2019    Hypothyroidism 07/18/2019    Debility     At high risk for injury related to fall     Gait instability 07/15/2019    Major depressive disorder 04/29/2019    Joint pain 04/29/2019    Chronic knee pain 11/30/2018    Obstructive sleep apnea 08/09/2018    Primary osteoarthritis of left knee 07/10/2018         Oncology History   Gastric adenocarcinoma   2/5/2021 Procedure    EGD  Feb 2021 EGD- Gastric tumor in the prepyloric region of the stomach,      3/17/2021 Procedure    EUS  Impression:           - Gastric tumor in the prepyloric region of the                         stomach. Biopsied. Lesion appears amenable to                         endoscopic resection (ESD) - Dr. Carballo was present                         to view the lesion.      5/25/2021 Initial Diagnosis    Gastric adenocarcinoma     5/25/2021 Pathology Significant Finding    Adenocarcinoma, moderate to poorly differentiated   Tumor invades deep layers of submuocsa with deep margin extensively positive   Deep margin is extensively positive   Lateral margins are negative for neoplasia or malignancy      5/25/2021 Cancer Staged    Staging form: Stomach, AJCC 8th Edition  - Pathologic stage from 5/25/2021: Stage Unknown (pT1, pNX, cM0)       6/18/2021 Imaging Significant Findings    CT CAP   Asymmetric gastric wall thickening at the level of the gastric antrum presumably representing the patient's gastric adenocarcinoma.  I see no CT evidence for metastatic disease.     Low-density focus lower pole left kidney does not meet criteria for a simple cyst.  Findings can be further evaluated with ultrasound.  Additional subcentimeter low-density foci in the right kidney likely too small to characterize by imaging.     6/28/2021 Tumor Conference       [facility]   UGI MULTIDISCIPLINARY TUMOR BOARD  PATIENT REVIEW FORM   ____________________________________________________________    CLINIC #: [id number]  DATE: 6/28/2021    DIAGNOSIS: gastric CA    PRESENTER: Latanya/ Donnie Sanders    PATIENT SUMMARY:   This 69 y/o female had incidental finding of gastric CA on EGD as part of bariatric evaluation given BMI 57. Underwent ESD per Dr. Carballo. Reviewed pathology - pT1b with LVI positive, extensive tumor at deep margin.     BOARD RECOMMENDATIONS:   Recommend perioperative chemotherapy, 4 cycles of FLOT    CONSULT NEEDED:     [] Surgery    [x] Hem/Onc    [] Rad/Onc    [] Dietary                 [] Social  Service    [] Psychology       [] AES  [] Radiology     ESD Pathology Stage: Tumor 1b  Node(s) 0 Metastasis 0      GROUP STAGE:  [] O    [] 1A    [] IB    [] IIA    [] IIB     [] IIIA     [] IIIB     [] IIIC    []IV  [] Local recurrence     [] Regional recurrence     [] Distant recurrence   Metastatic site(s): none         [x] Jennifer'l Treatment Guidelines reviewed and care planned is consistent with guidelines.         (i.e., NCCN, NCI, PD, ACO, AUA, etc.)    PRESENTATION AT CANCER CONFERENCE:         [x] Prospective    [] Retrospective     [] Follow-Up       7/21/2021 - 9/30/2021 Chemotherapy    Treatment Summary   Plan Name: OP GASTRIC FLOT - FLUOROURACIL LEUCOVORIN OXALIPLATIN DOCETAXEL Q2W  Treatment Goal: Curative  Status: Inactive  Start Date: 7/21/2021  End Date: 9/30/2021  Provider: Cathy Pelaez MD  Chemotherapy: DOCEtaxeL (TAXOTERE) 50 mg/m2 = 135 mg in sodium chloride 0.9% 250 mL chemo infusion, 50 mg/m2 = 135 mg, Intravenous, Clinic/HOD 1 time, 4 of 4 cycles  Dose modification: 40 mg/m2 (original dose 50 mg/m2, Cycle 3)  Administration: 135 mg (7/21/2021), 135 mg (8/12/2021), 105 mg (9/15/2021), 105 mg (9/29/2021)  leucovorin calcium 200 mg/m2 = 545 mg in dextrose 5 % 250 mL infusion, 200 mg/m2 = 545 mg, Intravenous, Clinic/HOD 1 time, 4 of 4 cycles  Administration: 545 mg (7/21/2021), 535 mg (8/12/2021), 530 mg (9/15/2021), 525 mg (9/29/2021)  oxaliplatin (ELOXATIN) 85 mg/m2 = 232 mg in dextrose 5 % 500 mL chemo infusion, 85 mg/m2 = 232 mg, Intravenous, Clinic/HOD 1 time, 4 of 4 cycles  Administration: 232 mg (7/21/2021), 228 mg (8/12/2021), 225 mg (9/15/2021), 223 mg (9/29/2021)  fluorouraciL 7,000 mg in sodium chloride 0.9% 240 mL chemo infusion, 7,100 mg, Intravenous, Over 24 hours, 4 of 11 cycles  Dose modification: 2,000 mg/m2 (original dose 2,600 mg/m2, Cycle 3), 225 mg/m2/day (original dose 2,600 mg/m2, Cycle 5)  Administration: 7,000 mg (7/21/2021), 6,970 mg (8/12/2021), 5,300 mg (9/15/2021),  5,000 mg (9/29/2021)       9/16/2021 Imaging Significant Findings    Thrombosis of the right popliteal, posterior tibial, and peroneal veins.     11/15/2021 Imaging Significant Findings    CT CAP Overall, no detrimental change compared to previous.  Persistent eccentric wall thickening at the level of the antrum in this patient with provided history of gastric adenocarcinoma.  No metastatic disease.     Subcentimeter pulmonary nodules unchanged.  No new nodules.     Cholelithiasis     1/13/2022 Procedure    EUS  Impression:            - Normal esophagus.                          - Scar in the gastric antrum.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

## 2022-08-30 NOTE — Clinical Note
Audio/video visit on Thursday. Pt will go to [facility] today  Ref to ophthal  US breast  Port flush per protocol

## 2022-08-30 NOTE — PLAN OF CARE
Vital Signs Stable, No apparent distress noted; Pt tolerated _port flush w/o difficulty.  Port-a-cath (accessed w/a 20g, 0.75in Foster), flushed, heparinized and deaccessed per protocol   Positive blood return noted.  No bleeding,swelling or drainage noted to site;bandaid applied  AVS/Discharge instructions given;Pt verbalizes understanding.   Discharged w/caregiver,pt in wheelchair left clinic in NAD

## 2022-08-30 NOTE — TELEPHONE ENCOUNTER
----- Message from Renee Okeefe sent at 8/30/2022  9:19 AM CDT -----  Brittani with hematology is calling to schedule patient appointment. She stated that the patient has red eyes that has been going on for less then 48hours and she isnt able to schedule.  I am unable to schedule urgent appt as well.  Please contact patient at [phone]

## 2022-08-30 NOTE — TELEPHONE ENCOUNTER
Spoke w/patient advise her to use Systane eye drops 2x daily for a few days. Pt understood. frances

## 2022-09-01 ENCOUNTER — OFFICE VISIT (OUTPATIENT)
Dept: HEMATOLOGY/ONCOLOGY | Facility: CLINIC | Age: 72
End: 2022-09-01
Payer: MEDICARE

## 2022-09-01 DIAGNOSIS — H11.31 SUBCONJUNCTIVAL HEMORRHAGE OF RIGHT EYE: ICD-10-CM

## 2022-09-01 DIAGNOSIS — C16.9 GASTRIC ADENOCARCINOMA: Primary | ICD-10-CM

## 2022-09-01 DIAGNOSIS — Z86.718 HISTORY OF DVT (DEEP VEIN THROMBOSIS): ICD-10-CM

## 2022-09-01 DIAGNOSIS — N61.0 MASTITIS IN FEMALE: ICD-10-CM

## 2022-09-01 PROCEDURE — 99441 PR PHYSICIAN TELEPHONE EVALUATION 5-10 MIN: CPT | Mod: 95,,, | Performed by: STUDENT IN AN ORGANIZED HEALTH CARE EDUCATION/TRAINING PROGRAM

## 2022-09-01 PROCEDURE — 4010F ACE/ARB THERAPY RXD/TAKEN: CPT | Mod: CPTII,95,, | Performed by: STUDENT IN AN ORGANIZED HEALTH CARE EDUCATION/TRAINING PROGRAM

## 2022-09-01 PROCEDURE — 1159F MED LIST DOCD IN RCRD: CPT | Mod: CPTII,95,, | Performed by: STUDENT IN AN ORGANIZED HEALTH CARE EDUCATION/TRAINING PROGRAM

## 2022-09-01 PROCEDURE — 4010F PR ACE/ARB THEARPY RXD/TAKEN: ICD-10-PCS | Mod: CPTII,95,, | Performed by: STUDENT IN AN ORGANIZED HEALTH CARE EDUCATION/TRAINING PROGRAM

## 2022-09-01 PROCEDURE — 1159F PR MEDICATION LIST DOCUMENTED IN MEDICAL RECORD: ICD-10-PCS | Mod: CPTII,95,, | Performed by: STUDENT IN AN ORGANIZED HEALTH CARE EDUCATION/TRAINING PROGRAM

## 2022-09-01 PROCEDURE — 1160F PR REVIEW ALL MEDS BY PRESCRIBER/CLIN PHARMACIST DOCUMENTED: ICD-10-PCS | Mod: CPTII,95,, | Performed by: STUDENT IN AN ORGANIZED HEALTH CARE EDUCATION/TRAINING PROGRAM

## 2022-09-01 PROCEDURE — 1160F RVW MEDS BY RX/DR IN RCRD: CPT | Mod: CPTII,95,, | Performed by: STUDENT IN AN ORGANIZED HEALTH CARE EDUCATION/TRAINING PROGRAM

## 2022-09-01 PROCEDURE — 99441 PR PHYSICIAN TELEPHONE EVALUATION 5-10 MIN: ICD-10-PCS | Mod: 95,,, | Performed by: STUDENT IN AN ORGANIZED HEALTH CARE EDUCATION/TRAINING PROGRAM

## 2022-09-01 NOTE — PROGRESS NOTES
Established Patient - Audio Only Telehealth Visit     The patient location is: [facility]  The chief complaint leading to consultation is: breast pain  Visit type: Virtual visit with audio only (telephone)  Total time spent with patient: 10 mins approx       The reason for the audio only service rather than synchronous audio and video virtual visit was related to technical difficulties or patient preference/necessity.     Each patient to whom I provide medical services by telemedicine is:  (1) informed of the relationship between the physician and patient and the respective role of any other health care provider with respect to management of the patient; and (2) notified that they may decline to receive medical services by telemedicine and may withdraw from such care at any time. Patient verbally consented to receive this service via voice-only telephone call.       HPI:   Pt with hx of gastric adenocarcinoma and DVT on chronic anticoagulation and morbid obesity.   F/u on breast pain and redness in eyes. Her anticoagulation was held.        Assessment and plan:       1. Gastric adenocarcinoma    2. History of DVT (deep vein thrombosis)    3. Subconjunctival hemorrhage of right eye    4. Mastitis in female        Mastitis- minimal relief with conservative measures. Start abx . US pending.     Subconjunctival h'age- continue holding anticoagulation. No vision issues. OTC artificial tears advised.     Re eval next week.     Future Appointments   Date Time Provider Department Center   9/26/2022  9:30 AM [facility] USOP3 [facility]   11/16/2022  9:40 AM Savanna Hyatt NP [facility]   11/16/2022 10:30 AM NON-CHEMO [facility]   11/28/2022 10:40 AM Lei Garcia MD [facility] Dario IZQUIERDO          This service was not originating from a related E/M service provided within the previous 7 days nor will  to an E/M service or procedure within the next 24 hours or my  soonest available appointment.  Prevailing standard of care was able to be met in this audio-only visit.

## 2022-09-13 DIAGNOSIS — Z85.028 HISTORY OF GASTRIC CANCER: Primary | ICD-10-CM

## 2022-09-14 ENCOUNTER — TELEPHONE (OUTPATIENT)
Dept: ENDOSCOPY | Facility: HOSPITAL | Age: 72
End: 2022-09-14
Payer: MEDICARE

## 2022-09-14 ENCOUNTER — PATIENT MESSAGE (OUTPATIENT)
Dept: ENDOSCOPY | Facility: HOSPITAL | Age: 72
End: 2022-09-14
Payer: MEDICARE

## 2022-09-14 NOTE — TELEPHONE ENCOUNTER
Good morning,    Pt is scheduled for an EGD/EUS with Dr. Ridley on 10/11/22.  Can pt hold her Eliquis for 2 days prior to procedure per endoscopy protocol?  Please advise.    Thank you

## 2022-09-14 NOTE — TELEPHONE ENCOUNTER
Telephoned pt to schedule EGD/EUS.  Spoke with pt and procedure scheduled for 10/11/22 at 8:00am.  Pt is fully vaccinated.  Reviewed medical history and medications.  Pt is currently taking Eliquis prescribed by Dr. Pelaez.  Message sent to Dr. Pelaez requesting approval to hold Eliquis for 2 days prior to procedure-pending approval (see telephone encounter).  Instructed on procedure and preparation.  Pt verbalized understanding.  Copy of instructions to be sent via patient portal upon receipt of above.

## 2022-09-15 ENCOUNTER — TELEPHONE (OUTPATIENT)
Dept: HEMATOLOGY/ONCOLOGY | Facility: CLINIC | Age: 72
End: 2022-09-15
Payer: MEDICARE

## 2022-09-15 NOTE — TELEPHONE ENCOUNTER
"Returned call and spoke with Mr. Coats. Asking if she should have labs prior to her next appt. Will discuss with Dr. Pelaez.     ----- Message from Carson Coughlin sent at 9/14/2022 12:18 PM CDT -----  Consult/Advisory:          Name Of Caller: Self       Contact Preference?: [phone]       Provider Name: Latanya      Does patient feel the need to be seen today? No      What is the nature of the call?: Returning call to office.          Additional Notes:  "Thank you for all that you do for our patients"       "

## 2022-09-26 ENCOUNTER — HOSPITAL ENCOUNTER (OUTPATIENT)
Dept: RADIOLOGY | Facility: OTHER | Age: 72
Discharge: HOME | End: 2022-09-26
Attending: STUDENT IN AN ORGANIZED HEALTH CARE EDUCATION/TRAINING PROGRAM
Payer: MEDICARE

## 2022-09-26 DIAGNOSIS — N61.0 MASTITIS IN FEMALE: ICD-10-CM

## 2022-09-26 DIAGNOSIS — N64.4 BREAST PAIN IN FEMALE: ICD-10-CM

## 2022-09-26 PROCEDURE — 76642 ULTRASOUND BREAST LIMITED: CPT | Mod: 26,RT,, | Performed by: RADIOLOGY

## 2022-09-26 PROCEDURE — 76642 US BREAST RIGHT LIMITED: ICD-10-PCS | Mod: 26,RT,, | Performed by: RADIOLOGY

## 2022-09-26 PROCEDURE — 76642 ULTRASOUND BREAST LIMITED: CPT | Mod: TC,RT

## 2022-09-27 DIAGNOSIS — C16.9 GASTRIC ADENOCARCINOMA: ICD-10-CM

## 2022-09-27 DIAGNOSIS — G89.29 CHRONIC PAIN OF LEFT KNEE: ICD-10-CM

## 2022-09-27 DIAGNOSIS — M25.562 CHRONIC PAIN OF LEFT KNEE: ICD-10-CM

## 2022-09-27 DIAGNOSIS — M17.12 PRIMARY OSTEOARTHRITIS OF LEFT KNEE: ICD-10-CM

## 2022-09-27 DIAGNOSIS — G89.4 CHRONIC PAIN DISORDER: ICD-10-CM

## 2022-09-27 RX ORDER — OXYCODONE AND ACETAMINOPHEN 5; 325 MG/1; MG/1
1 TABLET ORAL EVERY 8 HOURS PRN
Qty: 90 TABLET | Refills: 0 | Status: SHIPPED | OUTPATIENT
Start: 2022-09-28 | End: 2022-10-26 | Stop reason: SDUPTHER

## 2022-09-27 NOTE — TELEPHONE ENCOUNTER
----- Message from Francis Zambrano sent at 9/27/2022  9:08 AM CDT -----  Regarding: Refill  Who Called:LU RENAE [[id number]]        Refill or New Rx: Refill        RX Name and StrengthoxyCODONE-acetaminophen (PERCOCET) 5-325 mg per tablet        Is this a 30 day or 90 day RX: 90        Preferred Pharmacy with phone number:[facility]          Local or Mail Order: Local          Would the patient rather a call back or a response via [facility]? No        Best Call Back Number:[phone]        Additional Information:

## 2022-09-27 NOTE — TELEPHONE ENCOUNTER
Patient requesting refill on Oxycodone 5-325mg  Last office visit 08/16/22   shows last refill on 04/25/22  Patient does have a pain contract on [facility] department  Patient last UDS 08/16/22 was consistent with current the  CODEINE  Not Detected  Not Detected    MORPHINE  Not Detected  Not Detected    6-ACETYLMORPHINE  Not Detected  Not Detected    OXYCODONE  Present  Present    NOROYXCODONE  Present  Present    OXYMORPHONE  Present  Not Detected    NOROXYMORPHONE  Present  Not Detected    HYDROCODONE  Not Detected  Not Detected    NORHYDROCODONE  Not Detected  Not Detected    HYDROMORPHONE  Not Detected  Not Detected    BUPRENORPHINE  Not Detected  Not Detected    NORUBPRENORPHINE  Not Detected  Not Detected    FENTANYL  Not Detected  Not Detected    NORFENTANYL  Not Detected  Not Detected    MEPERIDINE METABOLITE  Not Detected  Not Detected    TAPENTADOL  Not Detected  Not Detected    TAPENTADOL-O-SULF  Not Detected  Not Detected    METHADONE  Not Detected  Not Detected    TRAMADOL  Not Detected  Not Detected    AMPHETAMINE  Not Detected  Not Detected    METHAMPHETAMINE  Not Detected  Not Detected    MDMA- ECSTASY  Not Detected  Not Detected    MDA  Not Detected  Not Detected    MDEA- Genoveva  Not Detected  Not Detected    METHYLPHENIDATE  Not Detected  Not Detected    PHENTERMINE  Not Detected  Not Detected    BENZOYLECGONINE  Not Detected  Not Detected    ALPRAZOLAM  Not Detected  Not Detected    ALPHA-OH-ALPRAZOLAM  Not Detected  Not Detected    CLONAZEPAM  Not Detected  Not Detected    7-AMINOCLONAZEPAM  Not Detected  Not Detected    DIAZEPAM  Not Detected  Not Detected    NORDIAZEPAM  Not Detected  Not Detected    OXAZEPAM  Not Detected  Not Detected    TEMAZEPAM  Not Detected  Not Detected    Lorazepam  Not Detected  Not Detected    MIDAZOLAM  Not Detected  Not Detected    ZOLPIDEM  Not Detected  Not Detected    BARBITURATES  Not Detected  Not Detected    Creatinine, Urine  20.0 - 400.0 mg/dL 205.6  102.0    ETHYL GLUCURONIDE  Present  Not Detected    MARIJUANA METABOLITE  Not Detected  Not Detected    PCP  Not Detected  Not Detected    CARISOPRODOL  Not Detected  Not Detected CM    Comment: The carisoprodol immunoassay has cross-reactivity to   carisoprodol and meprobamate.    Naloxone  Not Detected     Gabapentin  Present     Pregabalin  Not Detected     Alpha-OH-Midazolam  Not Detected     Zolpidem Metabolite  Not Detected     valentina

## 2022-10-04 ENCOUNTER — LAB VISIT (OUTPATIENT)
Dept: LAB | Facility: OTHER | Age: 72
End: 2022-10-04
Attending: STUDENT IN AN ORGANIZED HEALTH CARE EDUCATION/TRAINING PROGRAM
Payer: MEDICARE

## 2022-10-04 ENCOUNTER — OFFICE VISIT (OUTPATIENT)
Dept: HEMATOLOGY/ONCOLOGY | Facility: CLINIC | Age: 72
End: 2022-10-04
Payer: MEDICARE

## 2022-10-04 VITALS
DIASTOLIC BLOOD PRESSURE: 61 MMHG | OXYGEN SATURATION: 99 % | HEIGHT: 66 IN | HEART RATE: 82 BPM | SYSTOLIC BLOOD PRESSURE: 122 MMHG | RESPIRATION RATE: 16 BRPM | TEMPERATURE: 98 F | BODY MASS INDEX: 43.75 KG/M2 | WEIGHT: 272.25 LBS

## 2022-10-04 DIAGNOSIS — E66.01 MORBID OBESITY: ICD-10-CM

## 2022-10-04 DIAGNOSIS — C16.9 GASTRIC ADENOCARCINOMA: ICD-10-CM

## 2022-10-04 DIAGNOSIS — Z86.718 HISTORY OF DVT (DEEP VEIN THROMBOSIS): ICD-10-CM

## 2022-10-04 DIAGNOSIS — I10 ESSENTIAL HYPERTENSION: ICD-10-CM

## 2022-10-04 DIAGNOSIS — C16.9 GASTRIC ADENOCARCINOMA: Primary | ICD-10-CM

## 2022-10-04 DIAGNOSIS — I50.32 CHRONIC DIASTOLIC CONGESTIVE HEART FAILURE: ICD-10-CM

## 2022-10-04 LAB
ALBUMIN SERPL BCP-MCNC: 3.2 G/DL (ref 3.5–5.2)
ALP SERPL-CCNC: 100 U/L (ref 55–135)
ALT SERPL W/O P-5'-P-CCNC: 13 U/L (ref 10–44)
ANION GAP SERPL CALC-SCNC: 7 MMOL/L (ref 8–16)
AST SERPL-CCNC: 18 U/L (ref 10–40)
BILIRUB SERPL-MCNC: 0.7 MG/DL (ref 0.1–1)
BUN SERPL-MCNC: 9 MG/DL (ref 8–23)
CALCIUM SERPL-MCNC: 9.2 MG/DL (ref 8.7–10.5)
CHLORIDE SERPL-SCNC: 109 MMOL/L (ref 95–110)
CO2 SERPL-SCNC: 26 MMOL/L (ref 23–29)
CREAT SERPL-MCNC: 0.7 MG/DL (ref 0.5–1.4)
ERYTHROCYTE [DISTWIDTH] IN BLOOD BY AUTOMATED COUNT: 12.5 % (ref 11.5–14.5)
EST. GFR  (NO RACE VARIABLE): >60 ML/MIN/1.73 M^2
GLUCOSE SERPL-MCNC: 104 MG/DL (ref 70–110)
HCT VFR BLD AUTO: 38.3 % (ref 37–48.5)
HGB BLD-MCNC: 12.2 G/DL (ref 12–16)
IMM GRANULOCYTES # BLD AUTO: 0.03 K/UL (ref 0–0.04)
MAGNESIUM SERPL-MCNC: 2 MG/DL (ref 1.6–2.6)
MCH RBC QN AUTO: 32.9 PG (ref 27–31)
MCHC RBC AUTO-ENTMCNC: 31.9 G/DL (ref 32–36)
MCV RBC AUTO: 103 FL (ref 82–98)
NEUTROPHILS # BLD AUTO: 5.6 K/UL (ref 1.8–7.7)
PLATELET # BLD AUTO: 230 K/UL (ref 150–450)
PMV BLD AUTO: 10.3 FL (ref 9.2–12.9)
POTASSIUM SERPL-SCNC: 4.3 MMOL/L (ref 3.5–5.1)
PROT SERPL-MCNC: 6.5 G/DL (ref 6–8.4)
RBC # BLD AUTO: 3.71 M/UL (ref 4–5.4)
SODIUM SERPL-SCNC: 142 MMOL/L (ref 136–145)
WBC # BLD AUTO: 7.59 K/UL (ref 3.9–12.7)

## 2022-10-04 PROCEDURE — 99215 OFFICE O/P EST HI 40 MIN: CPT | Mod: S$GLB,,, | Performed by: STUDENT IN AN ORGANIZED HEALTH CARE EDUCATION/TRAINING PROGRAM

## 2022-10-04 PROCEDURE — 1160F RVW MEDS BY RX/DR IN RCRD: CPT | Mod: CPTII,S$GLB,, | Performed by: STUDENT IN AN ORGANIZED HEALTH CARE EDUCATION/TRAINING PROGRAM

## 2022-10-04 PROCEDURE — 80053 COMPREHEN METABOLIC PANEL: CPT | Performed by: STUDENT IN AN ORGANIZED HEALTH CARE EDUCATION/TRAINING PROGRAM

## 2022-10-04 PROCEDURE — 83735 ASSAY OF MAGNESIUM: CPT | Performed by: STUDENT IN AN ORGANIZED HEALTH CARE EDUCATION/TRAINING PROGRAM

## 2022-10-04 PROCEDURE — 1101F PR PT FALLS ASSESS DOC 0-1 FALLS W/OUT INJ PAST YR: ICD-10-PCS | Mod: CPTII,S$GLB,, | Performed by: STUDENT IN AN ORGANIZED HEALTH CARE EDUCATION/TRAINING PROGRAM

## 2022-10-04 PROCEDURE — 99499 RISK ADDL DX/OHS AUDIT: ICD-10-PCS | Mod: HCNC,S$GLB,, | Performed by: STUDENT IN AN ORGANIZED HEALTH CARE EDUCATION/TRAINING PROGRAM

## 2022-10-04 PROCEDURE — 1159F MED LIST DOCD IN RCRD: CPT | Mod: CPTII,S$GLB,, | Performed by: STUDENT IN AN ORGANIZED HEALTH CARE EDUCATION/TRAINING PROGRAM

## 2022-10-04 PROCEDURE — 1126F PR PAIN SEVERITY QUANTIFIED, NO PAIN PRESENT: ICD-10-PCS | Mod: CPTII,S$GLB,, | Performed by: STUDENT IN AN ORGANIZED HEALTH CARE EDUCATION/TRAINING PROGRAM

## 2022-10-04 PROCEDURE — 99215 PR OFFICE/OUTPT VISIT, EST, LEVL V, 40-54 MIN: ICD-10-PCS | Mod: S$GLB,,, | Performed by: STUDENT IN AN ORGANIZED HEALTH CARE EDUCATION/TRAINING PROGRAM

## 2022-10-04 PROCEDURE — 3008F BODY MASS INDEX DOCD: CPT | Mod: CPTII,S$GLB,, | Performed by: STUDENT IN AN ORGANIZED HEALTH CARE EDUCATION/TRAINING PROGRAM

## 2022-10-04 PROCEDURE — 1101F PT FALLS ASSESS-DOCD LE1/YR: CPT | Mod: CPTII,S$GLB,, | Performed by: STUDENT IN AN ORGANIZED HEALTH CARE EDUCATION/TRAINING PROGRAM

## 2022-10-04 PROCEDURE — 36415 COLL VENOUS BLD VENIPUNCTURE: CPT | Performed by: STUDENT IN AN ORGANIZED HEALTH CARE EDUCATION/TRAINING PROGRAM

## 2022-10-04 PROCEDURE — 3074F PR MOST RECENT SYSTOLIC BLOOD PRESSURE < 130 MM HG: ICD-10-PCS | Mod: CPTII,S$GLB,, | Performed by: STUDENT IN AN ORGANIZED HEALTH CARE EDUCATION/TRAINING PROGRAM

## 2022-10-04 PROCEDURE — 1126F AMNT PAIN NOTED NONE PRSNT: CPT | Mod: CPTII,S$GLB,, | Performed by: STUDENT IN AN ORGANIZED HEALTH CARE EDUCATION/TRAINING PROGRAM

## 2022-10-04 PROCEDURE — 3078F DIAST BP <80 MM HG: CPT | Mod: CPTII,S$GLB,, | Performed by: STUDENT IN AN ORGANIZED HEALTH CARE EDUCATION/TRAINING PROGRAM

## 2022-10-04 PROCEDURE — 99999 PR PBB SHADOW E&M-EST. PATIENT-LVL III: CPT | Mod: PBBFAC,,, | Performed by: STUDENT IN AN ORGANIZED HEALTH CARE EDUCATION/TRAINING PROGRAM

## 2022-10-04 PROCEDURE — 3288F PR FALLS RISK ASSESSMENT DOCUMENTED: ICD-10-PCS | Mod: CPTII,S$GLB,, | Performed by: STUDENT IN AN ORGANIZED HEALTH CARE EDUCATION/TRAINING PROGRAM

## 2022-10-04 PROCEDURE — 3078F PR MOST RECENT DIASTOLIC BLOOD PRESSURE < 80 MM HG: ICD-10-PCS | Mod: CPTII,S$GLB,, | Performed by: STUDENT IN AN ORGANIZED HEALTH CARE EDUCATION/TRAINING PROGRAM

## 2022-10-04 PROCEDURE — 85027 COMPLETE CBC AUTOMATED: CPT | Performed by: STUDENT IN AN ORGANIZED HEALTH CARE EDUCATION/TRAINING PROGRAM

## 2022-10-04 PROCEDURE — 1160F PR REVIEW ALL MEDS BY PRESCRIBER/CLIN PHARMACIST DOCUMENTED: ICD-10-PCS | Mod: CPTII,S$GLB,, | Performed by: STUDENT IN AN ORGANIZED HEALTH CARE EDUCATION/TRAINING PROGRAM

## 2022-10-04 PROCEDURE — 3008F PR BODY MASS INDEX (BMI) DOCUMENTED: ICD-10-PCS | Mod: CPTII,S$GLB,, | Performed by: STUDENT IN AN ORGANIZED HEALTH CARE EDUCATION/TRAINING PROGRAM

## 2022-10-04 PROCEDURE — 3074F SYST BP LT 130 MM HG: CPT | Mod: CPTII,S$GLB,, | Performed by: STUDENT IN AN ORGANIZED HEALTH CARE EDUCATION/TRAINING PROGRAM

## 2022-10-04 PROCEDURE — 4010F PR ACE/ARB THEARPY RXD/TAKEN: ICD-10-PCS | Mod: CPTII,S$GLB,, | Performed by: STUDENT IN AN ORGANIZED HEALTH CARE EDUCATION/TRAINING PROGRAM

## 2022-10-04 PROCEDURE — 99499 UNLISTED E&M SERVICE: CPT | Mod: HCNC,S$GLB,, | Performed by: STUDENT IN AN ORGANIZED HEALTH CARE EDUCATION/TRAINING PROGRAM

## 2022-10-04 PROCEDURE — 4010F ACE/ARB THERAPY RXD/TAKEN: CPT | Mod: CPTII,S$GLB,, | Performed by: STUDENT IN AN ORGANIZED HEALTH CARE EDUCATION/TRAINING PROGRAM

## 2022-10-04 PROCEDURE — 1159F PR MEDICATION LIST DOCUMENTED IN MEDICAL RECORD: ICD-10-PCS | Mod: CPTII,S$GLB,, | Performed by: STUDENT IN AN ORGANIZED HEALTH CARE EDUCATION/TRAINING PROGRAM

## 2022-10-04 PROCEDURE — 99999 PR PBB SHADOW E&M-EST. PATIENT-LVL III: ICD-10-PCS | Mod: PBBFAC,,, | Performed by: STUDENT IN AN ORGANIZED HEALTH CARE EDUCATION/TRAINING PROGRAM

## 2022-10-04 PROCEDURE — 3288F FALL RISK ASSESSMENT DOCD: CPT | Mod: CPTII,S$GLB,, | Performed by: STUDENT IN AN ORGANIZED HEALTH CARE EDUCATION/TRAINING PROGRAM

## 2022-10-04 NOTE — PROGRESS NOTES
Hematology- Oncology Clinic Note :      10/4/2022    RFV / chief complaint- Gastric Cancer        HPI  Pt is a 71 y.o. 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   Pt presents to the clinic today for gastric cancer    Breast lesion- resolved.  Redness in R eye- resolved. Has not started taking eliquis yet.   No brb in stool. No melena. No abd pain.   Joint pain is stable. No worsening leg swelling.     Advised to quit smoking    Oncology presentation/ HPI  Pt was being evaluated for gastric bypass surgery   Feb 2021 EGD- Gastric tumor in the prepyloric region of the stomach,  March 2021 EUS- Gastric tumor in the prepyloric region of the stomach.  Both above biopsies did not show malignancy  5/25/21 EGD- Gastric tumor on the posterior wall of the gastric antrum. Complete removal was accomplished.         Reviewed past medical/surgical/social history    Past Medical History:   Diagnosis Date    YOUNG (acute kidney injury) 10/15/2021    Anemia     2018    Arthritis     BMI 60.0-69.9, adult     Cataract     Chronic diastolic congestive heart failure 1/27/2021    Depression     Dry eye syndrome     Gastric adenocarcinoma 5/25/2021    GERD (gastroesophageal reflux disease)     Glaucoma suspect     History of gastric ulcer     History of psychiatric hospitalization     Hx of psychiatric care     Celexa, no recall of charted Effexor, Lexapro, Desyrel prescriptions    Hyperlipidemia     Hypertension     Hypothyroidism     Morbid obesity     Neuromuscular  disorder     Sleep apnea     Thyroid disease       Past Surgical History:   Procedure Laterality Date    APPENDECTOMY      CARDIAC SURGERY      CATARACT EXTRACTION W/  INTRAOCULAR LENS IMPLANT Bilateral     MFIOL OU    CORONARY ANGIOGRAPHY Bilateral 2/24/2021    Procedure: ANGIOGRAM, CORONARY ARTERY;  Surgeon: Cresencio Ridley MD;  Location: [facility] CATH LAB;  Service: Cardiology;  Laterality: Bilateral;  Covid test needed - ok per Danay SSCU. Pt. w/o transportation or family    ENDOSCOPIC ULTRASOUND OF UPPER GASTROINTESTINAL TRACT N/A 3/17/2021    Procedure: ULTRASOUND, UPPER GI TRACT, ENDOSCOPIC;  Surgeon: Gerald Anaya MD;  Location: [facility] (2ND FLR);  Service: Endoscopy;  Laterality: N/A;  Pt unable to get a ride for swab. Will do rapid test at 8:30 - ttr    ENDOSCOPIC ULTRASOUND OF UPPER GASTROINTESTINAL TRACT N/A 1/13/2022    Procedure: ULTRASOUND, UPPER GI TRACT, ENDOSCOPIC;  Surgeon: Kevin Carballo MD;  Location: [facility] (2ND FLR);  Service: Endoscopy;  Laterality: N/A;  blood thinner approval received, see telephone encounter 1/7/22-BB  rapid  instructions given verbally and sent to address on file in pt's chart-BB    ESOPHAGOGASTRODUODENOSCOPY N/A 2/5/2021    Procedure: EGD (ESOPHAGOGASTRODUODENOSCOPY);  Surgeon: Matthew Hernadez MD;  Location: [facility] (2ND FLR);  Service: Endoscopy;  Laterality: N/A;  BMI-58    Wt: 361#     COVID test at [facility] on 2/2-GT    ESOPHAGOGASTRODUODENOSCOPY N/A 3/17/2021    Procedure: EGD (ESOPHAGOGASTRODUODENOSCOPY);  Surgeon: Gerald Anaya MD;  Location: [facility] (2ND FLR);  Service: Endoscopy;  Laterality: N/A;    ESOPHAGOGASTRODUODENOSCOPY N/A 5/25/2021    Procedure: EGD (ESOPHAGOGASTRODUODENOSCOPY);  Surgeon: Kevin Carballo MD;  [facility]  Service: Endoscopy;  Laterality: N/A;  ESD 2 hours  Full COVID vaccination on file. ttr    ESOPHAGOGASTRODUODENOSCOPY N/A 1/13/2022    Procedure: EGD (ESOPHAGOGASTRODUODENOSCOPY);  Surgeon: Kevin  FRANCISCO Carballo MD;  Location: [facility] ENDO (2ND FLR);  Service: Endoscopy;  Laterality: N/A;  blood thinner approval, see telephone encounter 1/7/22-BB  rapid  instructions given verbally and sent to address on file in pt's chart-BB    EYE SURGERY      INJECTION OF ANESTHETIC AGENT AROUND NERVE Left 7/10/2018    Procedure: BLOCK, NERVE;  Surgeon: Samantha Vidal MD;  Location: [facility] PAIN MGT;  Service: Pain Management;  Laterality: Left;  Genicular     INJECTION OF ANESTHETIC AGENT AROUND NERVE Left 7/19/2019    Procedure: Block, Nerve NERVE BLOCK GENICULAR WITH PHENOL 6%;  Surgeon: Samantha Vidal MD;  Location: [facility] PAIN MGT;  Service: Pain Management;  Laterality: Left;  NEEDS CONSENT    INSERTION OF TUNNELED CENTRAL VENOUS CATHETER (CVC) WITH SUBCUTANEOUS PORT N/A 7/9/2021    Procedure: INSERTION, PORT-A-CATH;  Surgeon: Mario Paz MD;  Location: [facility] CATH LAB;  Service: Radiology;  Laterality: N/A;  PORT PLACEMENT    RADIOFREQUENCY ABLATION Left 6/19/2020    Procedure: RADIOFREQUENCY ABLATION LEFT GENICULAR;  Surgeon: Tevin Clark MD;  Location: [facility] PAIN MGT;  Service: Pain Management;  Laterality: Left;  Left Genicular RFA    RADIOFREQUENCY ABLATION Left 1/22/2021    Procedure: RADIOFREQUENCY ABLATION LEFT GENICULAR;  Surgeon: Tevin Clark MD;  Location: [facility] PAIN MGT;  Service: Pain Management;  Laterality: Left;    RADIOFREQUENCY ABLATION Left 7/30/2021    Procedure: RADIOFREQUENCY ABLATION, GENICULAR COOLED NEED CONSENT;  Surgeon: Tevin Clark MD;  Location: [facility] PAIN MGT;  Service: Pain Management;  Laterality: Left;    RADIOFREQUENCY ABLATION Left 4/22/2022    Procedure: RADIOFREQUENCY ABLATION, GENICULAR COOLED , LEFT;  Surgeon: Tevin Clark MD;  Location: [facility] PAIN MGT;  Service: Pain Management;  Laterality: Left;    TONSILLECTOMY        (Not in a hospital admission)    Review of patient's allergies indicates:  No Known Allergies   Social History     Tobacco Use    Smoking status: Some  "Days     Years: 50.00     Types: Cigarettes    Smokeless tobacco: Never    Tobacco comments:     currently smoking <5 cigarettes most days   Substance Use Topics    Alcohol use: Yes     Comment: meg      Family History   Problem Relation Age of Onset    No Known Problems Mother     No Known Problems Father     Colon cancer Neg Hx     Esophageal cancer Neg Hx           Review of Systems :  Review of Systems   Constitutional:  Positive for malaise/fatigue. Negative for chills, diaphoresis, fever and weight loss.   HENT: Negative.  Negative for congestion, hearing loss, nosebleeds, sore throat and tinnitus.    Eyes:  Negative for blurred vision, discharge and redness.   Respiratory:  Negative for cough, hemoptysis, sputum production, shortness of breath and wheezing.    Cardiovascular:  Negative for chest pain, palpitations and leg swelling.   Gastrointestinal:  Negative for abdominal pain, blood in stool, constipation, diarrhea, heartburn, melena, nausea and vomiting.   Genitourinary: Negative.    Musculoskeletal:  Positive for joint pain. Negative for back pain, falls and myalgias.   Skin:  Negative for itching and rash.   Neurological:  Negative for dizziness, tingling, sensory change, speech change, focal weakness, seizures, loss of consciousness, weakness and headaches.   Endo/Heme/Allergies: Negative.  Does not bruise/bleed easily.   Psychiatric/Behavioral: Negative.  Negative for depression. The patient is not nervous/anxious and does not have insomnia.              Physical Exam :  /61 (BP Location: Left arm, Patient Position: Sitting, BP Method: Medium (Automatic)) Comment (BP Location): left lower arm  Pulse 82   Temp 97.9 °F (36.6 °C) (Oral)   Resp 16   Ht 5' 6" (1.676 m)   Wt 123.5 kg (272 lb 4.3 oz)   SpO2 99%   BMI 43.95 kg/m²   Wt Readings from Last 3 Encounters:   10/04/22 123.5 kg (272 lb 4.3 oz)   08/30/22 122.4 kg (269 lb 13.5 oz)   08/16/22 121.1 kg (266 lb 15.6 oz)       Body mass " index is 43.95 kg/m².      Physical Exam  Vitals and nursing note reviewed.   Constitutional:       General: She is not in acute distress.     Appearance: She is well-developed. She is not ill-appearing.   HENT:      Head: Normocephalic and atraumatic.      Right Ear: External ear normal.      Left Ear: External ear normal.      Mouth/Throat:      Pharynx: No oropharyngeal exudate.   Eyes:      General: No scleral icterus.     Conjunctiva/sclera:      Right eye: No hemorrhage.     Left eye: No hemorrhage.  Neck:      Thyroid: No thyromegaly.      Trachea: No tracheal deviation.   Cardiovascular:      Rate and Rhythm: Normal rate and regular rhythm.      Heart sounds: Normal heart sounds. No murmur heard.  Pulmonary:      Effort: Pulmonary effort is normal. No respiratory distress.      Breath sounds: Normal breath sounds. No wheezing or rales.      Comments: Port SOI  Abdominal:      General: Bowel sounds are normal. There is no distension (obese).      Palpations: Abdomen is soft. There is no mass.      Tenderness: There is no abdominal tenderness. There is no rebound.      Comments: Skin examined, no infection in pannus area.    Musculoskeletal:         General: No swelling or tenderness. Normal range of motion.      Cervical back: Normal range of motion and neck supple.   Lymphadenopathy:      Cervical: No cervical adenopathy.   Skin:     General: Skin is warm.      Findings: No erythema, rash or wound.   Neurological:      Mental Status: She is alert and oriented to person, place, and time.      Cranial Nerves: No cranial nerve deficit.      Gait: Gait abnormal (uses cane).   Psychiatric:         Behavior: Behavior normal.           Current Outpatient Medications   Medication Sig Dispense Refill    acetaminophen (TYLENOL) 500 MG tablet       amLODIPine (NORVASC) 10 MG tablet TAKE 1 TABLET ONE TIME DAILY 90 tablet 1    atorvastatin (LIPITOR) 20 MG tablet TAKE 1 TABLET EVERY DAY 90 tablet 11    B-complex with  vitamin C (Z-BEC OR EQUIV) tablet Take 1 tablet by mouth once daily.       CALCIUM CITRATE-VITAMIN D2 ORAL Take 1 tablet by mouth once daily.       citalopram (CELEXA) 10 MG tablet TAKE 1 TABLET EVERY DAY 90 tablet 1    dextran 70-hypromellose (TEARS) ophthalmic solution Place 1 drop into the right eye every 6 (six) hours. 30 mL 0    ELIQUIS 5 mg Tab TAKE 1 TABLET TWICE DAILY 180 tablet 2    ferrous gluconate (FERGON) 324 MG tablet Take 1 tablet (324 mg total) by mouth daily with breakfast. 90 tablet 11    flu vac 2022 [id number], 60 mcg (15 mcg x 4)/0.5 mL Syrg Inject 0.5 mLs into the muscle once. for 1 dose 0.5 mL 0    furosemide (LASIX) 80 MG tablet Take 1 tablet (80 mg total) by mouth 2 (two) times a day. 60 tablet 11    gabapentin (NEURONTIN) 300 MG capsule Take 2 capsules (600 mg total) by mouth 3 (three) times daily. 540 capsule 2    ibuprofen (ADVIL,MOTRIN) 600 MG tablet Take 1 tablet (600 mg total) by mouth every 8 (eight) hours as needed for Pain. 10 tablet 0    lactulose (CHRONULAC) 10 gram/15 mL solution Take 30 mLs (20 g total) by mouth 2 (two) times daily as needed (constipation). 120 mL 1    LIDOcaine-prilocaine (EMLA) cream Apply topically as needed (prior to port access). 30 g 0    lisinopriL (PRINIVIL,ZESTRIL) 40 MG tablet TAKE 1 TABLET ONE TIME DAILY 90 tablet 1    multivitamin with minerals tablet Take 1 tablet by mouth once daily.       oxyCODONE-acetaminophen (PERCOCET) 5-325 mg per tablet Take 1 tablet by mouth every 8 (eight) hours as needed for Pain. 90 tablet 0    OZEMPIC 1 mg/dose (4 mg/3 mL) INJECT 1 MG INTO THE SKIN EVERY 7 DAYS. 9 pen 11    pantoprazole (PROTONIX) 40 MG tablet Take 1 tablet (40 mg total) by mouth Daily. 90 tablet 3     No current facility-administered medications for this visit.     Facility-Administered Medications Ordered in Other Visits   Medication Dose Route Frequency Provider Last Rate Last Admin    0.9%  NaCl infusion   Intravenous Continuous Tevin  MD Eduardo   Stopped at 01/13/22 1055       Pertinent Diagnostic studies:      Lab Visit on 10/04/2022   Component Date Value Ref Range Status    Magnesium 10/04/2022 2.0  1.6 - 2.6 mg/dL Final    WBC 10/04/2022 7.59  3.90 - 12.70 K/uL Final    RBC 10/04/2022 3.71 (L)  4.00 - 5.40 M/uL Final    Hemoglobin 10/04/2022 12.2  12.0 - 16.0 g/dL Final    Hematocrit 10/04/2022 38.3  37.0 - 48.5 % Final    MCV 10/04/2022 103 (H)  82 - 98 fL Final    MCH 10/04/2022 32.9 (H)  27.0 - 31.0 pg Final    MCHC 10/04/2022 31.9 (L)  32.0 - 36.0 g/dL Final    RDW 10/04/2022 12.5  11.5 - 14.5 % Final    Platelets 10/04/2022 230  150 - 450 K/uL Final    MPV 10/04/2022 10.3  9.2 - 12.9 fL Final    Gran # (ANC) 10/04/2022 5.6  1.8 - 7.7 K/uL Final    Comment: The ANC is based on a white cell differential from an   automated cell counter. It has not been microscopically   reviewed for the presence of abnormal cells. Clinical   correlation is required.      Immature Grans (Abs) 10/04/2022 0.03  0.00 - 0.04 K/uL Final    Comment: Mild elevation in immature granulocytes is non specific and   can be seen in a variety of conditions including stress response,   acute inflammation, trauma and pregnancy. Correlation with other   laboratory and clinical findings is essential.      Sodium 10/04/2022 142  136 - 145 mmol/L Final    Potassium 10/04/2022 4.3  3.5 - 5.1 mmol/L Final    Chloride 10/04/2022 109  95 - 110 mmol/L Final    CO2 10/04/2022 26  23 - 29 mmol/L Final    Glucose 10/04/2022 104  70 - 110 mg/dL Final    BUN 10/04/2022 9  8 - 23 mg/dL Final    Creatinine 10/04/2022 0.7  0.5 - 1.4 mg/dL Final    Calcium 10/04/2022 9.2  8.7 - 10.5 mg/dL Final    Total Protein 10/04/2022 6.5  6.0 - 8.4 g/dL Final    Albumin 10/04/2022 3.2 (L)  3.5 - 5.2 g/dL Final    Total Bilirubin 10/04/2022 0.7  0.1 - 1.0 mg/dL Final    Comment: For infants and newborns, interpretation of results should be based  on gestational age, weight and in agreement with  clinical  observations.    Premature Infant recommended reference ranges:  Up to 24 hours.............<8.0 mg/dL  Up to 48 hours............<12.0 mg/dL  3-5 days..................<15.0 mg/dL  6-29 days.................<15.0 mg/dL      Alkaline Phosphatase 10/04/2022 100  55 - 135 U/L Final    AST 10/04/2022 18  10 - 40 U/L Final    ALT 10/04/2022 13  10 - 44 U/L Final    Anion Gap 10/04/2022 7 (L)  8 - 16 mmol/L Final    eGFR 10/04/2022 >60  >60 mL/min/1.73 m^2 Final         Patient Active Problem List    Diagnosis Date Noted    Gastric adenocarcinoma 05/25/2021    Chronic diastolic congestive heart failure 01/27/2021    Class 3 severe obesity due to excess calories with serious comorbidity and body mass index (BMI) of 50.0 to 59.9 in adult 04/29/2019    Essential hypertension 04/29/2019    Left knee DJD 12/21/2018    Current mild episode of major depressive disorder without prior episode 03/07/2022    History of DVT (deep vein thrombosis) 10/15/2021    Chronic pain 07/30/2021    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    Abnormal cardiovascular stress test 02/24/2021    Tobacco abuse 01/28/2021    Pure hypercholesterolemia 01/27/2021    Gastroesophageal reflux disease 03/22/2020    Osteopenia of neck of left femur 01/14/2020    Left knee pain 07/19/2019    Hypothyroidism 07/18/2019    Debility     At high risk for injury related to fall     Gait instability 07/15/2019    Major depressive disorder 04/29/2019    Joint pain 04/29/2019    Chronic knee pain 11/30/2018    Obstructive sleep apnea 08/09/2018    Primary osteoarthritis of left knee 07/10/2018     Active Problem List with Overview Notes    Diagnosis Date Noted    Gastric adenocarcinoma 05/25/2021     gastric cancer cT2 or higher      Chronic diastolic congestive heart failure 01/27/2021    Class 3 severe obesity due to excess calories with serious comorbidity and body mass index (BMI) of 50.0 to 59.9 in adult 04/29/2019     Essential hypertension 04/29/2019    Left knee DJD 12/21/2018    Current mild episode of major depressive disorder without prior episode 03/07/2022    History of DVT (deep vein thrombosis) 10/15/2021    Chronic pain 07/30/2021    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    Abnormal cardiovascular stress test 02/24/2021    Tobacco abuse 01/28/2021    Pure hypercholesterolemia 01/27/2021    Gastroesophageal reflux disease 03/22/2020    Osteopenia of neck of left femur 01/14/2020    Left knee pain 07/19/2019    Hypothyroidism 07/18/2019    Debility     At high risk for injury related to fall     Gait instability 07/15/2019    Major depressive disorder 04/29/2019    Joint pain 04/29/2019    Chronic knee pain 11/30/2018    Obstructive sleep apnea 08/09/2018    Primary osteoarthritis of left knee 07/10/2018         Oncology History   Gastric adenocarcinoma   2/5/2021 Procedure    EGD  Feb 2021 EGD- Gastric tumor in the prepyloric region of the stomach,     3/17/2021 Procedure    EUS  Impression:           - Gastric tumor in the prepyloric region of the                         stomach. Biopsied. Lesion appears amenable to                         endoscopic resection (ESD) - Dr. Carballo was present                         to view the lesion.      5/25/2021 Initial Diagnosis    Gastric adenocarcinoma     5/25/2021 Pathology Significant Finding    Adenocarcinoma, moderate to poorly differentiated   Tumor invades deep layers of submuocsa with deep margin extensively positive   Deep margin is extensively positive   Lateral margins are negative for neoplasia or malignancy      5/25/2021 Cancer Staged    Staging form: Stomach, AJCC 8th Edition  - Pathologic stage from 5/25/2021: Stage Unknown (pT1, pNX, cM0)       6/18/2021 Imaging Significant Findings    CT CAP   Asymmetric gastric wall thickening at the level of the gastric antrum presumably representing the patient's gastric adenocarcinoma.  I see no  CT evidence for metastatic disease.     Low-density focus lower pole left kidney does not meet criteria for a simple cyst.  Findings can be further evaluated with ultrasound.  Additional subcentimeter low-density foci in the right kidney likely too small to characterize by imaging.     6/28/2021 Tumor Conference       [facility]   UGI MULTIDISCIPLINARY TUMOR BOARD  PATIENT REVIEW FORM   ____________________________________________________________    CLINIC #: [id number]  DATE: 6/28/2021    DIAGNOSIS: gastric CA    PRESENTER: Latanya/ Donnie Sanders    PATIENT SUMMARY:   This 69 y/o female had incidental finding of gastric CA on EGD as part of bariatric evaluation given BMI 57. Underwent ESD per Dr. Carballo. Reviewed pathology - pT1b with LVI positive, extensive tumor at deep margin.     BOARD RECOMMENDATIONS:   Recommend perioperative chemotherapy, 4 cycles of FLOT    CONSULT NEEDED:     [] Surgery    [x] Hem/Onc    [] Rad/Onc    [] Dietary                 [] Social Service    [] Psychology       [] AES  [] Radiology     ESD Pathology Stage: Tumor 1b  Node(s) 0 Metastasis 0      GROUP STAGE:  [] O    [] 1A    [] IB    [] IIA    [] IIB     [] IIIA     [] IIIB     [] IIIC    []IV  [] Local recurrence     [] Regional recurrence     [] Distant recurrence   Metastatic site(s): none         [x] Jennifer'l Treatment Guidelines reviewed and care planned is consistent with guidelines.         (i.e., NCCN, NCI, PD, ACO, AUA, etc.)    PRESENTATION AT CANCER CONFERENCE:         [x] Prospective    [] Retrospective     [] Follow-Up       7/21/2021 - 9/30/2021 Chemotherapy    Treatment Summary   Plan Name: OP GASTRIC FLOT - FLUOROURACIL LEUCOVORIN OXALIPLATIN DOCETAXEL Q2W  Treatment Goal: Curative  Status: Inactive  Start Date: 7/21/2021  End Date: 9/30/2021  Provider: Cathy Pelaez MD  Chemotherapy: DOCEtaxeL (TAXOTERE) 50 mg/m2 = 135 mg in sodium chloride 0.9% 250 mL chemo infusion, 50 mg/m2 = 135 mg, Intravenous, Clinic/HOD 1  time, 4 of 4 cycles  Dose modification: 40 mg/m2 (original dose 50 mg/m2, Cycle 3)  Administration: 135 mg (7/21/2021), 135 mg (8/12/2021), 105 mg (9/15/2021), 105 mg (9/29/2021)  leucovorin calcium 200 mg/m2 = 545 mg in dextrose 5 % 250 mL infusion, 200 mg/m2 = 545 mg, Intravenous, Clinic/HOD 1 time, 4 of 4 cycles  Administration: 545 mg (7/21/2021), 535 mg (8/12/2021), 530 mg (9/15/2021), 525 mg (9/29/2021)  oxaliplatin (ELOXATIN) 85 mg/m2 = 232 mg in dextrose 5 % 500 mL chemo infusion, 85 mg/m2 = 232 mg, Intravenous, Clinic/HOD 1 time, 4 of 4 cycles  Administration: 232 mg (7/21/2021), 228 mg (8/12/2021), 225 mg (9/15/2021), 223 mg (9/29/2021)  fluorouraciL 7,000 mg in sodium chloride 0.9% 240 mL chemo infusion, 7,100 mg, Intravenous, Over 24 hours, 4 of 11 cycles  Dose modification: 2,000 mg/m2 (original dose 2,600 mg/m2, Cycle 3), 225 mg/m2/day (original dose 2,600 mg/m2, Cycle 5)  Administration: 7,000 mg (7/21/2021), 6,970 mg (8/12/2021), 5,300 mg (9/15/2021), 5,000 mg (9/29/2021)       9/16/2021 Imaging Significant Findings    Thrombosis of the right popliteal, posterior tibial, and peroneal veins.     11/15/2021 Imaging Significant Findings    CT CAP Overall, no detrimental change compared to previous.  Persistent eccentric wall thickening at the level of the antrum in this patient with provided history of gastric adenocarcinoma.  No metastatic disease.     Subcentimeter pulmonary nodules unchanged.  No new nodules.     Cholelithiasis     1/13/2022 Procedure    EUS  Impression:            - Normal esophagus.                          - Scar in the gastric antrum. Biopsied.                          - Congested, nodular and ulcerated mucosa in the                          antrum. Biopsied. Treated with argon plasma                          coagulation (APC).                          - Acquired deformity in the prepyloric region of                          the stomach.                          - Normal examined  duodenum.                          - There was abnormal echogenicity in the                          visualized portion of the liver. This was                          hyperechoic and homogenous. This can be seen with                          fatty liver.                          - Hyperechoic material consistent with sludge was                          visualized endosonographically in the gallbladder.                          - There was dilation in the common bile duct which                          measured up to 10.8 mm.                          - Wall thickening was seen in the antrum of the                          stomach. This probable related to previous ESD.      2/14/2022 -  Chemotherapy    Treatment Summary   Plan Name: OP FLUOROURACIL (5 DAY) QW + XRT  Treatment Goal: Curative  Status: Active  Start Date: 2/14/2022  End Date: 3/30/2022  Provider: Cathy Pelaez MD  Chemotherapy: [No matching medication found in this treatment plan]       2/14/2022 - 3/29/2022 Radiation Therapy    Treating physician: Dr. Sonu Darden  Total Dose: 50.4 Gy  Fractions: 28    Course: C1 Abdomen 2022    Treatment Site Ref. ID Energy Dose/Fx (Gy) #Fx Dose Correction (Gy) Total Dose (Gy) Start Date End Date Elapsed Days   IM Stomach PTV_Low 6X 1.8 25 / 25 0 45 2/14/2022 3/24/2022 38   IM StomachBst PTVhigh 6X 1.8 3 / 3 0 5.4 3/25/2022 3/29/2022 4   F Isabel Coats is a 71 y.o. woman with at least [id number] adenocarcinoma of gastric antrum (posterior wall), intestinal type, moderate to poorly differentiated, invading deep layers of submuocsa with deep SM extensively positive, lateral margins negative, LVI focal positive, PNI present, no nodes examined, s/p 4 cycles of FLOT per [facility], but after re-assessment, no longer surgical candidate due to comorbidities.  PMH of  LAURA, HTN, HLD, Obesity, smoker, Mili CHF    She is s/p concurrent 5FU-CI and RT 45Gy/25fx to the entire stomach+elective nodes with 5.4Gy/3fx boost to the  prepylotic region completed 3/29/2022.              7/19/2022 Imaging Significant Findings    PET scan     In this patient with gastric cancer, there is no definite evidence of hypermetabolic tumor.     Nonspecific mildly hypermetabolic and diffuse cutaneous thickening of the lower anterior abdominal wall.  Recommend correlation with history and physical examination.       Assessment :       1. Gastric adenocarcinoma    2. History of DVT (deep vein thrombosis)    3. Chronic diastolic congestive heart failure    4. Morbid obesity    5. Essential hypertension        Plan :           Gastric adenocarcinoma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

## 2022-10-05 ENCOUNTER — IMMUNIZATION (OUTPATIENT)
Dept: PHARMACY | Facility: CLINIC | Age: 72
End: 2022-10-05
Payer: MEDICARE

## 2022-10-06 ENCOUNTER — PATIENT MESSAGE (OUTPATIENT)
Dept: BARIATRICS | Facility: CLINIC | Age: 72
End: 2022-10-06
Payer: MEDICARE

## 2022-10-11 ENCOUNTER — HOSPITAL ENCOUNTER (OUTPATIENT)
Facility: HOSPITAL | Age: 72
Discharge: HOME | End: 2022-10-11
Attending: INTERNAL MEDICINE | Admitting: INTERNAL MEDICINE
Payer: MEDICARE

## 2022-10-11 ENCOUNTER — ANESTHESIA EVENT (OUTPATIENT)
Dept: ENDOSCOPY | Facility: HOSPITAL | Age: 72
End: 2022-10-11
Payer: MEDICARE

## 2022-10-11 ENCOUNTER — ANESTHESIA (OUTPATIENT)
Dept: ENDOSCOPY | Facility: HOSPITAL | Age: 72
End: 2022-10-11
Payer: MEDICARE

## 2022-10-11 VITALS
TEMPERATURE: 97 F | SYSTOLIC BLOOD PRESSURE: 157 MMHG | BODY MASS INDEX: 42.91 KG/M2 | WEIGHT: 267 LBS | DIASTOLIC BLOOD PRESSURE: 75 MMHG | HEIGHT: 66 IN | RESPIRATION RATE: 16 BRPM | HEART RATE: 76 BPM | OXYGEN SATURATION: 96 %

## 2022-10-11 DIAGNOSIS — Z08 ENCOUNTER FOR FOLLOW-UP SURVEILLANCE OF GASTRIC CANCER: ICD-10-CM

## 2022-10-11 DIAGNOSIS — Z85.028 ENCOUNTER FOR FOLLOW-UP SURVEILLANCE OF GASTRIC CANCER: ICD-10-CM

## 2022-10-11 PROCEDURE — 27201012 HC FORCEPS, HOT/COLD, DISP: Performed by: INTERNAL MEDICINE

## 2022-10-11 PROCEDURE — 43239 PR EGD, FLEX, W/BIOPSY, SGL/MULTI: ICD-10-PCS | Mod: 59,,, | Performed by: INTERNAL MEDICINE

## 2022-10-11 PROCEDURE — 88305 TISSUE EXAM BY PATHOLOGIST: CPT | Performed by: PATHOLOGY

## 2022-10-11 PROCEDURE — D9220A PRA ANESTHESIA: ICD-10-PCS | Mod: CRNA,,, | Performed by: NURSE ANESTHETIST, CERTIFIED REGISTERED

## 2022-10-11 PROCEDURE — D9220A PRA ANESTHESIA: Mod: ANES,,, | Performed by: ANESTHESIOLOGY

## 2022-10-11 PROCEDURE — 37000008 HC ANESTHESIA 1ST 15 MINUTES: Performed by: INTERNAL MEDICINE

## 2022-10-11 PROCEDURE — 63600175 PHARM REV CODE 636 W HCPCS: Performed by: NURSE ANESTHETIST, CERTIFIED REGISTERED

## 2022-10-11 PROCEDURE — 37000009 HC ANESTHESIA EA ADD 15 MINS: Performed by: INTERNAL MEDICINE

## 2022-10-11 PROCEDURE — 43259 EGD US EXAM DUODENUM/JEJUNUM: CPT | Mod: ,,, | Performed by: INTERNAL MEDICINE

## 2022-10-11 PROCEDURE — 43239 EGD BIOPSY SINGLE/MULTIPLE: CPT | Mod: 59,,, | Performed by: INTERNAL MEDICINE

## 2022-10-11 PROCEDURE — 88305 TISSUE EXAM BY PATHOLOGIST: CPT | Mod: 26,,, | Performed by: PATHOLOGY

## 2022-10-11 PROCEDURE — D9220A PRA ANESTHESIA: ICD-10-PCS | Mod: ANES,,, | Performed by: ANESTHESIOLOGY

## 2022-10-11 PROCEDURE — D9220A PRA ANESTHESIA: Mod: CRNA,,, | Performed by: NURSE ANESTHETIST, CERTIFIED REGISTERED

## 2022-10-11 PROCEDURE — 43259 EGD US EXAM DUODENUM/JEJUNUM: CPT | Performed by: INTERNAL MEDICINE

## 2022-10-11 PROCEDURE — 25000003 PHARM REV CODE 250: Performed by: INTERNAL MEDICINE

## 2022-10-11 PROCEDURE — 43259 PR ENDOSCOPIC ULTRASOUND EXAM: ICD-10-PCS | Mod: ,,, | Performed by: INTERNAL MEDICINE

## 2022-10-11 PROCEDURE — 43239 EGD BIOPSY SINGLE/MULTIPLE: CPT | Mod: 59 | Performed by: INTERNAL MEDICINE

## 2022-10-11 PROCEDURE — 88305 TISSUE EXAM BY PATHOLOGIST: ICD-10-PCS | Mod: 26,,, | Performed by: PATHOLOGY

## 2022-10-11 PROCEDURE — 25000003 PHARM REV CODE 250: Performed by: NURSE ANESTHETIST, CERTIFIED REGISTERED

## 2022-10-11 RX ORDER — FENTANYL CITRATE 50 UG/ML
INJECTION, SOLUTION INTRAMUSCULAR; INTRAVENOUS
Status: DISCONTINUED | OUTPATIENT
Start: 2022-10-11 | End: 2022-10-11

## 2022-10-11 RX ORDER — SODIUM CHLORIDE 9 MG/ML
INJECTION, SOLUTION INTRAVENOUS CONTINUOUS
Status: DISCONTINUED | OUTPATIENT
Start: 2022-10-11 | End: 2022-10-11 | Stop reason: HOSPADM

## 2022-10-11 RX ORDER — ONDANSETRON 2 MG/ML
4 INJECTION INTRAMUSCULAR; INTRAVENOUS ONCE AS NEEDED
Status: CANCELLED | OUTPATIENT
Start: 2022-10-11 | End: 2034-03-08

## 2022-10-11 RX ORDER — LIDOCAINE HYDROCHLORIDE 20 MG/ML
INJECTION, SOLUTION EPIDURAL; INFILTRATION; INTRACAUDAL; PERINEURAL
Status: DISCONTINUED | OUTPATIENT
Start: 2022-10-11 | End: 2022-10-11

## 2022-10-11 RX ORDER — PROPOFOL 10 MG/ML
VIAL (ML) INTRAVENOUS
Status: DISCONTINUED | OUTPATIENT
Start: 2022-10-11 | End: 2022-10-11

## 2022-10-11 RX ORDER — ONDANSETRON 2 MG/ML
INJECTION INTRAMUSCULAR; INTRAVENOUS
Status: DISCONTINUED | OUTPATIENT
Start: 2022-10-11 | End: 2022-10-11

## 2022-10-11 RX ORDER — SODIUM CHLORIDE 0.9 % (FLUSH) 0.9 %
10 SYRINGE (ML) INJECTION
Status: CANCELLED | OUTPATIENT
Start: 2022-10-11

## 2022-10-11 RX ADMIN — PROPOFOL 200 MCG/KG/MIN: 10 INJECTION, EMULSION INTRAVENOUS at 07:10

## 2022-10-11 RX ADMIN — FENTANYL CITRATE 25 MCG: 0.05 INJECTION, SOLUTION INTRAMUSCULAR; INTRAVENOUS at 07:10

## 2022-10-11 RX ADMIN — LIDOCAINE HYDROCHLORIDE 100 MG: 20 INJECTION, SOLUTION EPIDURAL; INFILTRATION; INTRACAUDAL; PERINEURAL at 07:10

## 2022-10-11 RX ADMIN — PROPOFOL 50 MG: 10 INJECTION, EMULSION INTRAVENOUS at 07:10

## 2022-10-11 RX ADMIN — GLYCOPYRROLATE 0.2 MG: 0.2 INJECTION INTRAMUSCULAR; INTRAVENOUS at 08:10

## 2022-10-11 RX ADMIN — ONDANSETRON 4 MG: 2 INJECTION INTRAMUSCULAR; INTRAVENOUS at 08:10

## 2022-10-11 RX ADMIN — SODIUM CHLORIDE: 0.9 INJECTION, SOLUTION INTRAVENOUS at 07:10

## 2022-10-11 NOTE — ANESTHESIA PREPROCEDURE EVALUATION
10/11/2022  Ca Coats is a 71 y.o., female   Pre-operative evaluation for Procedure(s) (LRB):  EGD (ESOPHAGOGASTRODUODENOSCOPY) (N/A)  ULTRASOUND, UPPER GI TRACT, ENDOSCOPIC (N/A)    Ca Coats is a 71 y.o. female     Patient Active Problem List   Diagnosis    Primary osteoarthritis of left knee    Obstructive sleep apnea    Chronic knee pain    Left knee DJD    Essential hypertension    Major depressive disorder    Class 3 severe obesity due to excess calories with serious comorbidity and body mass index (BMI) of 50.0 to 59.9 in adult    Joint pain    Gait instability    At high risk for injury related to fall    Debility    Hypothyroidism    Left knee pain    Osteopenia of neck of left femur    Gastroesophageal reflux disease    Chronic diastolic congestive heart failure    Pure hypercholesterolemia    Tobacco abuse    Abnormal cardiovascular stress test    Gastric adenocarcinoma    Iron deficiency anemia secondary to blood loss (chronic)    Chronic pain    History of DVT (deep vein thrombosis)    Current mild episode of major depressive disorder without prior episode       Review of patient's allergies indicates:  No Known Allergies    Current Facility-Administered Medications on File Prior to Encounter   Medication Dose Route Frequency Provider Last Rate Last Admin    0.9%  NaCl infusion   Intravenous Continuous Tevin Clark MD   Stopped at 01/13/22 1055     Current Outpatient Medications on File Prior to Encounter   Medication Sig Dispense Refill    acetaminophen (TYLENOL) 500 MG tablet       amLODIPine (NORVASC) 10 MG tablet TAKE 1 TABLET ONE TIME DAILY 90 tablet 1    atorvastatin (LIPITOR) 20 MG tablet TAKE 1 TABLET EVERY DAY 90 tablet 11    B-complex with vitamin C (Z-BEC OR EQUIV) tablet Take 1 tablet by mouth once daily.       CALCIUM  CITRATE-VITAMIN D2 ORAL Take 1 tablet by mouth once daily.       citalopram (CELEXA) 10 MG tablet TAKE 1 TABLET EVERY DAY 90 tablet 1    dextran 70-hypromellose (TEARS) ophthalmic solution Place 1 drop into the right eye every 6 (six) hours. 30 mL 0    ELIQUIS 5 mg Tab TAKE 1 TABLET TWICE DAILY 180 tablet 2    ferrous gluconate (FERGON) 324 MG tablet Take 1 tablet (324 mg total) by mouth daily with breakfast. 90 tablet 11    furosemide (LASIX) 80 MG tablet Take 1 tablet (80 mg total) by mouth 2 (two) times a day. 60 tablet 11    gabapentin (NEURONTIN) 300 MG capsule Take 2 capsules (600 mg total) by mouth 3 (three) times daily. 540 capsule 2    ibuprofen (ADVIL,MOTRIN) 600 MG tablet Take 1 tablet (600 mg total) by mouth every 8 (eight) hours as needed for Pain. 10 tablet 0    lactulose (CHRONULAC) 10 gram/15 mL solution Take 30 mLs (20 g total) by mouth 2 (two) times daily as needed (constipation). 120 mL 1    LIDOcaine-prilocaine (EMLA) cream Apply topically as needed (prior to port access). 30 g 0    lisinopriL (PRINIVIL,ZESTRIL) 40 MG tablet TAKE 1 TABLET ONE TIME DAILY 90 tablet 1    multivitamin with minerals tablet Take 1 tablet by mouth once daily.       OZEMPIC 1 mg/dose (4 mg/3 mL) INJECT 1 MG INTO THE SKIN EVERY 7 DAYS. 9 pen 11    pantoprazole (PROTONIX) 40 MG tablet Take 1 tablet (40 mg total) by mouth Daily. 90 tablet 3       Past Surgical History:   Procedure Laterality Date    APPENDECTOMY      CARDIAC SURGERY      CATARACT EXTRACTION W/  INTRAOCULAR LENS IMPLANT Bilateral     MFIOL OU    CORONARY ANGIOGRAPHY Bilateral 2/24/2021    Procedure: ANGIOGRAM, CORONARY ARTERY;  Surgeon: Cresencio Ridley MD;  Location: [facility] CATH LAB;  Service: Cardiology;  Laterality: Bilateral;  Covid test needed - ok per Danay MORENOU. Pt. w/o transportation or family    ENDOSCOPIC ULTRASOUND OF UPPER GASTROINTESTINAL TRACT N/A 3/17/2021    Procedure: ULTRASOUND, UPPER GI TRACT, ENDOSCOPIC;  Surgeon:  Gerald Anaya MD;  [facility]  Service: Endoscopy;  Laterality: N/A;  Pt unable to get a ride for swab. Will do rapid test at 8:30 - ttr    ENDOSCOPIC ULTRASOUND OF UPPER GASTROINTESTINAL TRACT N/A 1/13/2022    Procedure: ULTRASOUND, UPPER GI TRACT, ENDOSCOPIC;  Surgeon: Kevin Carballo MD;  Location: [facility] (2ND FLR);  Service: Endoscopy;  Laterality: N/A;  blood thinner approval received, see telephone encounter 1/7/22-BB  rapid  instructions given verbally and sent to address on file in pt's chart-BB    ESOPHAGOGASTRODUODENOSCOPY N/A 2/5/2021    Procedure: EGD (ESOPHAGOGASTRODUODENOSCOPY);  Surgeon: Matthew Hernadez MD;  Location: [facility] (2ND FLR);  Service: Endoscopy;  Laterality: N/A;  BMI-58    Wt: 361#     COVID test at [facility] on 2/2-GT    ESOPHAGOGASTRODUODENOSCOPY N/A 3/17/2021    Procedure: EGD (ESOPHAGOGASTRODUODENOSCOPY);  Surgeon: Gerald Anaya MD;  [facility]  Service: Endoscopy;  Laterality: N/A;    ESOPHAGOGASTRODUODENOSCOPY N/A 5/25/2021    Procedure: EGD (ESOPHAGOGASTRODUODENOSCOPY);  Surgeon: Kevin Carballo MD;  [facility]  Service: Endoscopy;  Laterality: N/A;  ESD 2 hours  Full COVID vaccination on file. ttr    ESOPHAGOGASTRODUODENOSCOPY N/A 1/13/2022    Procedure: EGD (ESOPHAGOGASTRODUODENOSCOPY);  Surgeon: Kevin Carballo MD;  [facility]  Service: Endoscopy;  Laterality: N/A;  blood thinner approval, see telephone encounter 1/7/22-BB  rapid  instructions given verbally and sent to address on file in pt's chart-BB    EYE SURGERY      INJECTION OF ANESTHETIC AGENT AROUND NERVE Left 7/10/2018    Procedure: BLOCK, NERVE;  Surgeon: Samantha Vidal MD;  Location: [facility] PAIN MGT;  Service: Pain Management;  Laterality: Left;  Genicular     INJECTION OF ANESTHETIC AGENT AROUND NERVE Left 7/19/2019    Procedure: Block, Nerve NERVE BLOCK GENICULAR WITH PHENOL 6%;  Surgeon: Samantha Vidal MD;   Location: [facility] PAIN MGT;  Service: Pain Management;  Laterality: Left;  NEEDS CONSENT    INSERTION OF TUNNELED CENTRAL VENOUS CATHETER (CVC) WITH SUBCUTANEOUS PORT N/A 7/9/2021    Procedure: INSERTION, PORT-A-CATH;  Surgeon: Mario Paz MD;  Location: [facility] CATH LAB;  Service: Radiology;  Laterality: N/A;  PORT PLACEMENT    RADIOFREQUENCY ABLATION Left 6/19/2020    Procedure: RADIOFREQUENCY ABLATION LEFT GENICULAR;  Surgeon: Tevin Clark MD;  Location: [facility] PAIN MGT;  Service: Pain Management;  Laterality: Left;  Left Genicular RFA    RADIOFREQUENCY ABLATION Left 1/22/2021    Procedure: RADIOFREQUENCY ABLATION LEFT GENICULAR;  Surgeon: Tevin Clark MD;  Location: [facility] PAIN MGT;  Service: Pain Management;  Laterality: Left;    RADIOFREQUENCY ABLATION Left 7/30/2021    Procedure: RADIOFREQUENCY ABLATION, GENICULAR COOLED NEED CONSENT;  Surgeon: Tevin Clark MD;  Location: [facility] PAIN MGT;  Service: Pain Management;  Laterality: Left;    RADIOFREQUENCY ABLATION Left 4/22/2022    Procedure: RADIOFREQUENCY ABLATION, GENICULAR COOLED , LEFT;  Surgeon: Tevin Clark MD;  Location: [facility] PAIN MGT;  Service: Pain Management;  Laterality: Left;    TONSILLECTOMY       Pre-op Assessment    I have reviewed the Patient Summary Reports.     I have reviewed the Nursing Notes. I have reviewed the NPO Status.   I have reviewed the Medications.     Review of Systems  Anesthesia Hx:  No problems with previous Anesthesia Denies Hx of Anesthetic complications  History of prior surgery of interest to airway management or planning: Denies Family Hx of Anesthesia complications.   Denies Personal Hx of Anesthesia complications.   Social:  Smoker, Social Alcohol Use    Hematology/Oncology:  Hematology Normal   Oncology Normal     EENT/Dental:EENT/Dental Normal   Cardiovascular:   Exercise tolerance: good Hypertension CHF    Pulmonary:  Pulmonary Normal    Renal/:  Renal/ Normal     Hepatic/GI:   GERD     Musculoskeletal:   Arthritis     Neurological:  Neurology Normal    Endocrine:   Hypothyroidism  Morbid Obesity / BMI > 40  Psych:   depression          Physical Exam  General: Well nourished, Cooperative, Alert and Oriented    Airway:  Mallampati: III   Mouth Opening: Normal  TM Distance: Normal  Tongue: Normal  Neck ROM: Normal ROM    Dental:    Chest/Lungs:  Clear to auscultation, Normal Respiratory Rate    Heart:  Rate: Normal  Rhythm: Regular Rhythm    Summary     The left ventricle is normal in size with normal systolic function. The estimated ejection fraction is 65%.   Grade II left ventricular diastolic dysfunction.   Normal right ventricular size with normal right ventricular systolic function.   Biatrial enlargement.   Mild tricuspid regurgitation.   The estimated PA systolic pressure is 41 mmHg.   There is pulmonary hypertension.   Normal central venous pressure (3 mmHg).   There is mobile annular calcifications attached to the nikhil-lateral part of annulus of the valve ( image 46). Clinical correlation is required.      Anesthesia Plan  Type of Anesthesia, risks & benefits discussed:    Anesthesia Type: Gen ETT  Intra-op Monitoring Plan: Standard ASA Monitors  Post Op Pain Control Plan: multimodal analgesia and IV/PO Opioids PRN  Induction:  IV  Airway Plan: Direct  Informed Consent: Informed consent signed with the Patient and all parties understand the risks and agree with anesthesia plan.  All questions answered. Patient consented to blood products? No  ASA Score: 3  Day of Surgery Review of History & Physical: H&P Update referred to the surgeon/provider.    Ready For Surgery From Anesthesia Perspective.     .

## 2022-10-11 NOTE — [employer]OVATION PATIENT INSTRUCTIONS
Discharge Summary/Instructions after an Endoscopic Procedure  Patient Name: Ca Coats  Patient MRN: [id number]  Patient YOB: 1950 Tuesday, October 11, 2022  Dequan Ridley MD  Dear patient,  As a result of recent federal legislation (The Federal Cures Act), you may   receive lab or pathology results from your procedure in your [facility]   account before your physician is able to contact you. Your physician or   their representative will relay the results to you with their   recommendations at their soonest availability.  Thank you,  RESTRICTIONS:  During your procedure today, you received medications for sedation.  These   medications may affect your judgment, balance and coordination.  Therefore,   for 24 hours, you have the following restrictions:   - DO NOT drive a car, operate machinery, make legal/financial decisions,   sign important papers or drink alcohol.    ACTIVITY:  Today: no heavy lifting, straining or running due to procedural   sedation/anesthesia.  The following day: return to full activity including work.  DIET:  Eat and drink normally unless instructed otherwise.     TREATMENT FOR COMMON SIDE EFFECTS:  - Mild abdominal pain, nausea, belching, bloating or excessive gas:  rest,   eat lightly and use a heating pad.  - Sore Throat: treat with throat lozenges and/or gargle with warm salt   water.  - Because air was used during the procedure, expelling large amounts of air   from your rectum or belching is normal.  - If a bowel prep was taken, you may not have a bowel movement for 1-3 days.    This is normal.  SYMPTOMS TO WATCH FOR AND REPORT TO YOUR PHYSICIAN:  1. Abdominal pain or bloating, other than gas cramps.  2. Chest pain.  3. Back pain.  4. Signs of infection such as: chills or fever occurring within 24 hours   after the procedure.  5. Rectal bleeding, which would show as bright red, maroon, or black stools.   (A tablespoon of blood from the rectum is not serious, especially  if   hemorrhoids are present.)  6. Vomiting.  7. Weakness or dizziness.  GO DIRECTLY TO THE NEAREST EMERGENCY ROOM IF YOU HAVE ANY OF THE FOLLOWING:      Difficulty breathing              Chills and/or fever over 101 F   Persistent vomiting and/or vomiting blood   Severe abdominal pain   Severe chest pain   Black, tarry stools   Bleeding- more than one tablespoon   Any other symptom or condition that you feel may need urgent attention  Your doctor recommends these additional instructions:  If any biopsies were taken, your doctors clinic will contact you in 1 to 2   weeks with any results.  - Discharge patient to home (ambulatory).   - Patient has a contact number available for emergencies.  The signs and   symptoms of potential delayed complications were discussed with the   patient.  Return to normal activities tomorrow.  Written discharge   instructions were provided to the patient.   - Resume previous diet.   - Continue present medications.   - Await path results.   - Repeat the EGD and upper endoscopic ultrasound for surveillance based on   pathology results possibly in the next 6-12 months.   - Return to referring physician.  For questions, problems or results please call your physician - Dequan Ridley MD at Work:  [phone].  [facility], EMERGENCY ROOM PHONE NUMBER: [phone]  IF A COMPLICATION OR EMERGENCY SITUATION ARISES AND YOU ARE UNABLE TO REACH   YOUR PHYSICIAN - GO DIRECTLY TO THE EMERGENCY ROOM.  Dequan Ridley MD  10/11/2022 8:54:13 AM  This report has been verified and signed electronically.  Dear patient,  As a result of recent federal legislation (The Federal Cures Act), you may   receive lab or pathology results from your procedure in your [facility]   account before your physician is able to contact you. Your physician or   their representative will relay the results to you with their   recommendations at their soonest availability.  Thank you,  PROVATION

## 2022-10-11 NOTE — H&P
Short Stay Endoscopy History and Physical    PCP - Lei Garcia MD  Referring Physician - Melia Nicholas RN  No address on file    Procedure - EGD and EUS for surveillance post ESD for gastric cancer at antrum  ASA - per anesthesia  Mallampati - per anesthesia  History of Anesthesia problems - per anesthesia  Family history Anesthesia problems -  per anesthesia   Plan of anesthesia - per anesthesia    HPI:  This is a 71 y.o. female here for evaluation of: EGD and EUS for surveillance post ESD for gastric cancer at antrum - Eliquis held 3 days prior    Reflux - no  Dysphagia - no  Abdominal pain - no  Diarrhea - no    ROS:  Constitutional: No fevers, chills, No weight loss  CV: No chest pain  Pulm: No cough, No shortness of breath  Ophtho: No vision changes  GI: see HPI  Derm: No rash    Medical History:  has a past medical history of YOUNG (acute kidney injury) (10/15/2021), Anemia, Arthritis, BMI 60.0-69.9, adult, Cataract, Chronic diastolic congestive heart failure (1/27/2021), Depression, Dry eye syndrome, Gastric adenocarcinoma (5/25/2021), GERD (gastroesophageal reflux disease), Glaucoma suspect, History of gastric ulcer, History of psychiatric hospitalization, psychiatric care, Hyperlipidemia, Hypertension, Hypothyroidism, Morbid obesity, Neuromuscular disorder, Sleep apnea, and Thyroid disease.    Surgical History:  has a past surgical history that includes Appendectomy; Injection of anesthetic agent around nerve (Left, 7/10/2018); Cataract extraction w/  intraocular lens implant (Bilateral); Injection of anesthetic agent around nerve (Left, 7/19/2019); Radiofrequency ablation (Left, 6/19/2020); Radiofrequency ablation (Left, 1/22/2021); Esophagogastroduodenoscopy (N/A, 2/5/2021); Tonsillectomy; Cardiac surgery; Eye surgery; Coronary angiography (Bilateral, 2/24/2021); Endoscopic ultrasound of upper gastrointestinal tract (N/A, 3/17/2021); Esophagogastroduodenoscopy (N/A, 3/17/2021);  Esophagogastroduodenoscopy (N/A, 5/25/2021); Insertion of tunneled central venous catheter (CVC) with subcutaneous port (N/A, 7/9/2021); Radiofrequency ablation (Left, 7/30/2021); Endoscopic ultrasound of upper gastrointestinal tract (N/A, 1/13/2022); Esophagogastroduodenoscopy (N/A, 1/13/2022); and Radiofrequency ablation (Left, 4/22/2022).    Family History: family history includes No Known Problems in her father and mother..    Social History:  reports that she has been smoking cigarettes. She has never used smokeless tobacco. She reports current alcohol use. She reports that she does not use drugs.    Review of patient's allergies indicates:  No Known Allergies    Medications:   Medications Prior to Admission   Medication Sig Dispense Refill Last Dose    acetaminophen (TYLENOL) 500 MG tablet    Past Month    amLODIPine (NORVASC) 10 MG tablet TAKE 1 TABLET ONE TIME DAILY 90 tablet 1 10/10/2022    atorvastatin (LIPITOR) 20 MG tablet TAKE 1 TABLET EVERY DAY 90 tablet 11 10/10/2022    B-complex with vitamin C (Z-BEC OR EQUIV) tablet Take 1 tablet by mouth once daily.    10/11/2022    CALCIUM CITRATE-VITAMIN D2 ORAL Take 1 tablet by mouth once daily.    10/11/2022    citalopram (CELEXA) 10 MG tablet TAKE 1 TABLET EVERY DAY 90 tablet 1 10/10/2022    ELIQUIS 5 mg Tab TAKE 1 TABLET TWICE DAILY 180 tablet 2 Past Week    ferrous gluconate (FERGON) 324 MG tablet Take 1 tablet (324 mg total) by mouth daily with breakfast. 90 tablet 11 10/10/2022    furosemide (LASIX) 80 MG tablet Take 1 tablet (80 mg total) by mouth 2 (two) times a day. 60 tablet 11 10/10/2022    gabapentin (NEURONTIN) 300 MG capsule Take 2 capsules (600 mg total) by mouth 3 (three) times daily. 540 capsule 2 10/10/2022    lisinopriL (PRINIVIL,ZESTRIL) 40 MG tablet TAKE 1 TABLET ONE TIME DAILY 90 tablet 1 10/11/2022    multivitamin with minerals tablet Take 1 tablet by mouth once daily.    10/10/2022    oxyCODONE-acetaminophen (PERCOCET) 5-325 mg per tablet  Take 1 tablet by mouth every 8 (eight) hours as needed for Pain. 90 tablet 0 10/10/2022    OZEMPIC 1 mg/dose (4 mg/3 mL) INJECT 1 MG INTO THE SKIN EVERY 7 DAYS. 9 pen 11 Past Week    pantoprazole (PROTONIX) 40 MG tablet Take 1 tablet (40 mg total) by mouth Daily. 90 tablet 3 10/10/2022    dextran 70-hypromellose (TEARS) ophthalmic solution Place 1 drop into the right eye every 6 (six) hours. 30 mL 0     ibuprofen (ADVIL,MOTRIN) 600 MG tablet Take 1 tablet (600 mg total) by mouth every 8 (eight) hours as needed for Pain. 10 tablet 0     lactulose (CHRONULAC) 10 gram/15 mL solution Take 30 mLs (20 g total) by mouth 2 (two) times daily as needed (constipation). 120 mL 1     LIDOcaine-prilocaine (EMLA) cream Apply topically as needed (prior to port access). 30 g 0        Physical Exam:    Vital Signs:   Vitals:    10/11/22 0717   BP: (!) 143/67   Pulse: 71   Resp: 14   Temp: 98.2 °F (36.8 °C)       General Appearance: Well appearing in no acute distress    Labs:  Lab Results   Component Value Date    WBC 7.59 10/04/2022    HGB 12.2 10/04/2022    HCT 38.3 10/04/2022     10/04/2022    CHOL 86 (L) 12/03/2020    TRIG 80 12/03/2020    HDL 39 (L) 12/03/2020    ALT 13 10/04/2022    AST 18 10/04/2022     10/04/2022    K 4.3 10/04/2022     10/04/2022    CREATININE 0.7 10/04/2022    BUN 9 10/04/2022    CO2 26 10/04/2022    TSH 0.442 10/26/2021    INR 0.9 07/15/2019    HGBA1C 4.4 06/30/2021       I have explained the risks and benefits of this endoscopic procedure to the patient including but not limited to bleeding, inflammation, infection, perforation, missing a lesion and death.      Dequan Ridley MD

## 2022-10-11 NOTE — TRANSFER OF CARE
"Anesthesia Transfer of Care Note    Patient: Ca Coats    Procedure(s) Performed: Procedure(s) (LRB):  EGD (ESOPHAGOGASTRODUODENOSCOPY) (N/A)  ULTRASOUND, UPPER GI TRACT, ENDOSCOPIC (N/A)    Patient location: [facility]    Anesthesia Type: general    Transport from OR: Transported from OR on 2-3 L/min O2 by NC with adequate spontaneous ventilation    Post pain: adequate analgesia    Post assessment: no apparent anesthetic complications    Level of consciousness: awake and alert    Nausea/Vomiting: no nausea/vomiting    Complications: none    Transfer of care protocol was followed      Last vitals:   Visit Vitals  BP (!) 143/67 (BP Location: Left arm, Patient Position: Lying)   Pulse 71   Temp 36.8 °C (98.2 °F) (Skin)   Resp 14   Ht 5' 6" (1.676 m)   Wt 121.1 kg (267 lb)   SpO2 98%   Breastfeeding No   BMI 43.09 kg/m²     "

## 2022-10-11 NOTE — PLAN OF CARE
Pt awake and alert.  Pain tolerable and procedure/surgery site stable.  Discharge instructions reviewed and handout given. Waiting for Md to discuss procedure and plan of care with pt.

## 2022-10-13 LAB
FINAL PATHOLOGIC DIAGNOSIS: NORMAL
GROSS: NORMAL
Lab: NORMAL

## 2022-11-07 ENCOUNTER — TELEPHONE (OUTPATIENT)
Dept: HEMATOLOGY/ONCOLOGY | Facility: CLINIC | Age: 72
End: 2022-11-07

## 2022-11-07 NOTE — TELEPHONE ENCOUNTER
----- Message from Cyrs Gonzalez sent at 11/7/2022 12:59 PM CST -----  Regarding: dentist  Name of Who is Calling: LU RENAE [[id number]]      What is the request in detail:patient states she has a dentist appointment and she wants to know how long does she need to stop taking ELIQUIS prior to her procedure       Can the clinic reply by [facility]: no      What Number to Call Back if not in [facility]: [phone]

## 2022-11-08 RX ORDER — PANTOPRAZOLE SODIUM 40 MG/1
TABLET, DELAYED RELEASE ORAL
Qty: 90 TABLET | Refills: 0 | OUTPATIENT
Start: 2022-11-08

## 2022-11-08 NOTE — TELEPHONE ENCOUNTER
Care Due:                  Date            Visit Type   Department     Provider  --------------------------------------------------------------------------------                                EP -                              PRIMARY      [facility] INTERNAL  Last Visit: 05-      CARE ([facility])   MEDICINE       Lei Garcia                               -                              PRIMARY      [facility] INTERNAL  Next Visit: 11-      CARE ([facility])   MEDICINE       Lei Garcia                                                            Last  Test          Frequency    Reason                     Performed    Due Date  --------------------------------------------------------------------------------    HBA1C.......  6 months...  OZEMPIC..................  06- 12-    Lipid Panel.  12 months..  atorvastatin.............  12- 11-    [employer] Embedded Care Gaps. Reference number: [id number]. 11/08/2022   8:52:29 AM CST

## 2022-11-09 ENCOUNTER — TELEPHONE (OUTPATIENT)
Dept: HEMATOLOGY/ONCOLOGY | Facility: CLINIC | Age: 72
End: 2022-11-09
Payer: MEDICARE

## 2022-11-09 NOTE — TELEPHONE ENCOUNTER
----- Message from Betty Arlen sent at 11/9/2022  1:00 PM CST -----  Contact: LU RENAE [[id number]]  Type: Call Back      Who called: LU RENAE [[id number]]      What is the request in detail: Patient is requesting a call back. Pt states that she called and left a message in regard to a clearance she will need for a dental appointment. Please advise.       Can the clinic reply by [facility]? No      Would the patient rather a call back or a response via [facility]? Call back       Best call back number: [phone]      Additional Information:

## 2022-11-09 NOTE — TELEPHONE ENCOUNTER
Refill Decision Note   Ca Coats  is requesting a refill authorization.  Brief Assessment and Rationale for Refill:  Quick Discontinue     Medication Therapy Plan: Pharmacy is requesting new scripts for the following medications without required information, (sig/ frequency/qty/etc)     Medication Reconciliation Completed: No   Comments:     No Care Gaps recommended.     Note composed:11:19 PM 11/08/2022

## 2022-11-09 NOTE — TELEPHONE ENCOUNTER
Returned call and spoke with Ms Coats. Ms Coats has advised by her dentist to have teeth extracted. Ms Coats is currently taking Eliquis. Her dentist has requested Dr Pelaez clear her for the extraction. Informed Ms Coats her Dentist office has faxed to us a medical clearance letter. Once completed by Dr Pelaez, the clearance will be faxed back to [facility].

## 2022-11-15 ENCOUNTER — TELEPHONE (OUTPATIENT)
Dept: PAIN MEDICINE | Facility: CLINIC | Age: 72
End: 2022-11-15
Payer: MEDICARE

## 2022-11-16 ENCOUNTER — OFFICE VISIT (OUTPATIENT)
Dept: PAIN MEDICINE | Facility: CLINIC | Age: 72
End: 2022-11-16
Payer: MEDICARE

## 2022-11-16 VITALS
HEIGHT: 66 IN | BODY MASS INDEX: 44.2 KG/M2 | DIASTOLIC BLOOD PRESSURE: 78 MMHG | WEIGHT: 275 LBS | OXYGEN SATURATION: 100 % | HEART RATE: 70 BPM | SYSTOLIC BLOOD PRESSURE: 130 MMHG | RESPIRATION RATE: 18 BRPM

## 2022-11-16 DIAGNOSIS — G89.4 CHRONIC PAIN DISORDER: ICD-10-CM

## 2022-11-16 DIAGNOSIS — M17.12 PRIMARY OSTEOARTHRITIS OF LEFT KNEE: ICD-10-CM

## 2022-11-16 DIAGNOSIS — G89.29 CHRONIC PAIN OF LEFT KNEE: Primary | ICD-10-CM

## 2022-11-16 DIAGNOSIS — G89.29 CHRONIC PAIN OF LEFT KNEE: ICD-10-CM

## 2022-11-16 DIAGNOSIS — C16.9 GASTRIC ADENOCARCINOMA: ICD-10-CM

## 2022-11-16 DIAGNOSIS — M25.562 CHRONIC PAIN OF LEFT KNEE: Primary | ICD-10-CM

## 2022-11-16 DIAGNOSIS — M25.562 CHRONIC PAIN OF LEFT KNEE: ICD-10-CM

## 2022-11-16 PROCEDURE — 1160F RVW MEDS BY RX/DR IN RCRD: CPT | Mod: CPTII,S$GLB,, | Performed by: NURSE PRACTITIONER

## 2022-11-16 PROCEDURE — 3078F DIAST BP <80 MM HG: CPT | Mod: CPTII,S$GLB,, | Performed by: NURSE PRACTITIONER

## 2022-11-16 PROCEDURE — 3075F SYST BP GE 130 - 139MM HG: CPT | Mod: CPTII,S$GLB,, | Performed by: NURSE PRACTITIONER

## 2022-11-16 PROCEDURE — 3008F BODY MASS INDEX DOCD: CPT | Mod: CPTII,S$GLB,, | Performed by: NURSE PRACTITIONER

## 2022-11-16 PROCEDURE — 1101F PT FALLS ASSESS-DOCD LE1/YR: CPT | Mod: CPTII,S$GLB,, | Performed by: NURSE PRACTITIONER

## 2022-11-16 PROCEDURE — 3288F FALL RISK ASSESSMENT DOCD: CPT | Mod: CPTII,S$GLB,, | Performed by: NURSE PRACTITIONER

## 2022-11-16 PROCEDURE — 1159F PR MEDICATION LIST DOCUMENTED IN MEDICAL RECORD: ICD-10-PCS | Mod: CPTII,S$GLB,, | Performed by: NURSE PRACTITIONER

## 2022-11-16 PROCEDURE — 4010F ACE/ARB THERAPY RXD/TAKEN: CPT | Mod: CPTII,S$GLB,, | Performed by: NURSE PRACTITIONER

## 2022-11-16 PROCEDURE — 99999 PR PBB SHADOW E&M-EST. PATIENT-LVL V: CPT | Mod: PBBFAC,,, | Performed by: NURSE PRACTITIONER

## 2022-11-16 PROCEDURE — 1160F PR REVIEW ALL MEDS BY PRESCRIBER/CLIN PHARMACIST DOCUMENTED: ICD-10-PCS | Mod: CPTII,S$GLB,, | Performed by: NURSE PRACTITIONER

## 2022-11-16 PROCEDURE — 1125F PR PAIN SEVERITY QUANTIFIED, PAIN PRESENT: ICD-10-PCS | Mod: CPTII,S$GLB,, | Performed by: NURSE PRACTITIONER

## 2022-11-16 PROCEDURE — 1159F MED LIST DOCD IN RCRD: CPT | Mod: CPTII,S$GLB,, | Performed by: NURSE PRACTITIONER

## 2022-11-16 PROCEDURE — 4010F PR ACE/ARB THEARPY RXD/TAKEN: ICD-10-PCS | Mod: CPTII,S$GLB,, | Performed by: NURSE PRACTITIONER

## 2022-11-16 PROCEDURE — 99214 OFFICE O/P EST MOD 30 MIN: CPT | Mod: S$GLB,,, | Performed by: NURSE PRACTITIONER

## 2022-11-16 PROCEDURE — 1125F AMNT PAIN NOTED PAIN PRSNT: CPT | Mod: CPTII,S$GLB,, | Performed by: NURSE PRACTITIONER

## 2022-11-16 PROCEDURE — 1101F PR PT FALLS ASSESS DOC 0-1 FALLS W/OUT INJ PAST YR: ICD-10-PCS | Mod: CPTII,S$GLB,, | Performed by: NURSE PRACTITIONER

## 2022-11-16 PROCEDURE — 3078F PR MOST RECENT DIASTOLIC BLOOD PRESSURE < 80 MM HG: ICD-10-PCS | Mod: CPTII,S$GLB,, | Performed by: NURSE PRACTITIONER

## 2022-11-16 PROCEDURE — 99999 PR PBB SHADOW E&M-EST. PATIENT-LVL V: ICD-10-PCS | Mod: PBBFAC,,, | Performed by: NURSE PRACTITIONER

## 2022-11-16 PROCEDURE — 99214 PR OFFICE/OUTPT VISIT, EST, LEVL IV, 30-39 MIN: ICD-10-PCS | Mod: S$GLB,,, | Performed by: NURSE PRACTITIONER

## 2022-11-16 PROCEDURE — 3288F PR FALLS RISK ASSESSMENT DOCUMENTED: ICD-10-PCS | Mod: CPTII,S$GLB,, | Performed by: NURSE PRACTITIONER

## 2022-11-16 PROCEDURE — 3008F PR BODY MASS INDEX (BMI) DOCUMENTED: ICD-10-PCS | Mod: CPTII,S$GLB,, | Performed by: NURSE PRACTITIONER

## 2022-11-16 PROCEDURE — 3075F PR MOST RECENT SYSTOLIC BLOOD PRESS GE 130-139MM HG: ICD-10-PCS | Mod: CPTII,S$GLB,, | Performed by: NURSE PRACTITIONER

## 2022-11-16 RX ORDER — GABAPENTIN 300 MG/1
600 CAPSULE ORAL 3 TIMES DAILY
Qty: 540 CAPSULE | Refills: 2 | Status: SHIPPED | OUTPATIENT
Start: 2022-11-16 | End: 2023-05-01 | Stop reason: SDUPTHER

## 2022-11-16 RX ORDER — OXYCODONE AND ACETAMINOPHEN 5; 325 MG/1; MG/1
1 TABLET ORAL EVERY 8 HOURS PRN
Qty: 90 TABLET | Refills: 0 | Status: SHIPPED | OUTPATIENT
Start: 2022-11-27 | End: 2022-12-27 | Stop reason: SDUPTHER

## 2022-11-16 NOTE — PROGRESS NOTES
"Chronic patient Established Note (Follow up visit)    HPI:  Ca Coats is a 69 y.o. female who presents today with left knee pain. Her pain has been going on for "too long."  She was previously treated by Dr. Iyer at [facility].  She has injections a0hrsyly with him.  These were helpful, but she does not know if the injections were steroid or viscosupplementation.   This pain is described in detail below.     Interval History (4/30/2018):  The patient returns to clinic today for follow up and records review. We did received records from [facility] including a MRI of her knee. Dr. Iyer's last office visit note from January 2017 does not include any previous injections. She continues to report bilateral knee pain, left greater than right. She describes this pain as constant and aching. This pain is worse with prolonged walking. She also report right shoulder pain with prolonged overhead activity. She continues to take Percocet with benefit. She denies any other health changes.     Interval History (5/31/2018):  The patient returns to clinic for follow up. She is s/p left knee Synvisc One injection on 5/8/2018. She reports 35% relief of her knee pain. She continues to report bilateral knee pain, left greater than right. She describes this pain as constant and aching. Her pain is worse with prolonged walking and standing. She is scheduled to see Orthopedics at the  End of this month. She is also following up with Bariatrics to discuss the lap band procedure. She continues to take Percocet with benefit. She also uses Voltaren gel with benefit but this is expensive for her. She denies any other health changes.          Interval History (7/2/2018):  The patient returns to clinic today for follow up. She continues to report left knee pain that is constant, sharp, and aching in nature. Her pain is worse with prolonged walking and standing. She is scheduled for weight loss surgery in August. She continues " "follow up with orthopedics who does not recommend surgery at this time due to her BMI. She continues to take Percocet as needed with benefit. She denies any other health changes.      Interval History (7/19/2018):  The patient returns to clinic today for follow up. She is s/p left genicular nerve block on 7/10/2018. She reports 80% relief of her left knee pain. She reports that she was able to walk for longer distances (3 blocks) after the block. Her pain has returned to baseline. She continues to report left knee pain that is constant, sharp, and aching in nature. Her pain is worse with prolonged walking and standing. She denies any other health changes.      Interval History (8/21/2018):  The patient returns to clinic today for follow up. She is s/p left genicular cooled RFA on 7/31/2018. She reports 60% relief of her left knee pain. She reports intermittent knee pain that is sore and aching in nature. She continues to perform a home exercise routine. She is actively trying to lose weight. She continues to follow up with Bariatric Surgery at [facility]. She is considering weight loss surgery. She continues to use compound cream with benefit. She denies any other health changes.      Interval History (11/21/2018):  The patient returns to clinic today for follow up. She reports increased left knee pain this past week. She reports 2 episodes where her knee has "locked" up on her while walking. She describes her knee pain as sore and aching. She is actively trying to lose weight and quit smoking. She continues to use the compound cream with benefit. She denies any other health changes.      Interval History (1/9/2019):  The patient returns to clinic today for follow up. She is s/p left knee steroid injection on 12/21/2018. She reports one day of relief. She continues to report left knee pain that is constant, sharp, and aching in nature. Her pain is worse with standing and walking. She reports that her knee "locks" up on " her while walking. She often feels as though she is going to fall. She is scheduled for bariatric weight loss surgery in February at [facility]. She denies any other health changes.      Interval History (2/28/2019):  The patient returns to clinic today for follow up. She is s/p Euflexxa series completed on 1/30/2019. She reports that she able to stand for longer periods of time since the procedure. She continues to report left knee pain that is sore and aching in nature. Her pain is worse with prolonged standing and walking. She was previously scheduled for weight loss surgery but reports this was canceled. She is scheduled for follow up next week about this. She denies any other health changes.      Interval History (4/12/2019):  The patient returns for f/u of left knee pain.  She is s/p left genicular cooled RFA with about 90% pain relief.  She has been able to walk easier.  She also notices less stiffness in her knee.  She is planning on gastric surgery prior to knee replacement.  She had benefit with compounding cream but it is no longer covered by her insurance.  She does take Percocet from her PCP.  Her pain today is 7/10.     Interval History (7/11/19):  Patient reported to ED today for increased L knee pain and an episode of LLE weakness that lead to a near fall. Patient is s/p L genicular RFA in 3/19/19 that provided 90% relief for 2 months then came back. Pain is a crunchy pain, in left knee, that does not radiate, worse with movement, better with rest. Pain today is 9/10. She normally uses cane to ambulate but is currently using wheelchair at hospital. Denies any trauma ot the area, fever/chills, n/v, abdominal pain, or HA.     Interval History (8/8/2019):  She returns today for follow up s/p left knee genicular chemodenervation with phenol 7/19/19.  She reports that this procedure did not provide any improvement in her left knee symptoms, and she is still having significant difficulty with ambulation, still  uses wheelchair for mobility. She also reports left lateral thigh and hip pain lateral upper thigh numbness. She has been admitted to [facility] but is not receiving PT there. She is still considering lap band surgery but has missed a followup appointment.      Interval History (9/4/2019):  The patient returns to clinic today for follow up. She continues to report intermittent left lateral thigh and hip pain that is tingling and numb in nature. She continues to report left knee pain. She continues to have difficulty ambulating due to pain. She is currently living in a SNF. She continues to take Gabapentin 300 mg BID. This was increased at last visit but not increased at the nursing home. She does report benefit with Percocet though it only last approximately 4-5 hours. She denies any adverse effects. She does present with a care attendant from the SNF today. Her pain today is 10/10.     Interval History (10/4/2019):  The patient returns to clinic today for follow up and medication refill. She continues to report bilateral knee pain that is aching in nature. She reports intermittent left lateral thigh pain that is tingling and shooting in nature. She reports that her pain has been improving since last visit. She has increased her activity. She continues to report benefit with Gabapentin. She continues to take Percocet as needed with benefit. She denies any other health changes. Her pain today is 0/10.     Interval History (1/6/2020):  The patient returns to clinic today for follow up. She reports increased left leg pain today that is tingling in nature. She continues to report bilateral knee pain, left greater than right. She describes this pain as constant, sharp, and aching. She is no longer living at the nursing facility. She has increased her home exercise routine. She is actively trying to quit smoking in order to move forward with bariatric surgery. She continues to report benefit with current  medication regimen. She takes Gabapentin and Percocet with benefit and without adverse effect. She denies any other health changes. Her pain today is 7/10.     Interval History (4/8/2020):  She returns today for follow up via audio.  She reports that the methocarbamol is not helping.  She continues to have a left-sided low back pain that is bothering her.  Her left knee continues to hurt.  Her right knee is hurting a little as well.  Percocet continues to help, but it is not lasting long enough.     Interval History (5/1/2020):  The patient returns for audio visit today for follow up. She continues to report bilateral knee pain, left greater than right. Her pain is worse with prolonged standing and walking. She reports that her back pain has improved. She continues to report benefit with current medication regimen. She continues to take Gabapentin and Percocet with benefit and without adverse effects. She denies any other health changes. Her pain today is 8/10.     Interval History (6/2/2020):  The patient returns to clinic today via audio visit for follow up of knee pain. She reports increased left knee pain in the last week. Her pain is worse with standing and walking. She feels as though she will fall when standing. She is currently using ice, heat, and OTC lidocaine patches with limited relief. She continues to take Gabapentin and Percocet with some benefit. She denies any adverse effects. She denies any other health changes. Her pain today is 10/10.     Interval History 7/8/2020:  Ca Coats presents to the clinic for a follow-up appointment for follow up after 6/19/20 RFA Since the last visit, Ca Coats states the pain has been persistant. Current pain intensity is 10/10.    Interval History 8/11/2020:  The patient returns to clinic today for follow up of knee pain. She is s/p left knee Orthovisc injection on 7/28/2020. She reports limited relief at this time. She continues to  report left knee pain, especially with standing and walking. She feels as though her knee will buckle. She has recently quit smoking. She is actively trying to lose weight. She was recently started on Ozempic per her PCP. She is following her diet plan. She continues to take Percocet with benefit but reports that it does not last. She denies any adverse effects with this medication. She denies any other health changes. Her pain today is 8/10.    Interval History 9/3/2020:  The patient returns to clinic today for follow up of knee pain. She reports some relief with left knee Orthovisc injection in July. She continues to report left knee pain that is sharp in nature. This pain is worse with standing and walking. She feels as though her knee will give out with prolonged standing. She is actively trying to lose weight but is frustrated with lack of progress. She is following her diet plan. She continues to Percocet with benefit but it does not last. She denies any adverse effects. She denies any other health changes. Her pain today is 7/10.    Interval History 12/1/2020:  The patient returns to clinic today for follow up of knee pain. She reports increased left knee pain over the last few weeks. She also reports that her left knee feels weaker over the last month. She feels as thought it will give out with standing and walking. She would like to repeat RFA as she feels this helps her feel more stable and decreases her pain. She continues to take Percocet with some benefit but asks if this can be stronger. She denies any adverse effects with this medication. She continues to follow up with Bariatrics. She continues to follow a diet plan and take Ozempic. She denies any other health changes. Her pain today is 10/10.    Interval History 3/24/2021:  The patient returns to clinic today for follow up of knee pain. She is s/p left genicular RFA on 1/22/2021. She reports some benefit of her left knee pain. She is able to stand  and walking for longer periods of time. She continues to report left knee pain. Since last visit, she had angiogram that was normal. She has discontinued anticoagulation. She also had a recent EGD which she is awaiting the results. She continues to see Bariatrics. She is actively trying to lose weight. She continues to take Gabapentin with benefit. She continues to take Percocet with benefit and without adverse effects. She denies any other health changes. Her pain today is 0/10.    Interval History 6/1/2021:  The patient returns to clinic today for follow up of knee pain. She reports increased left knee pain over the last two weeks. She has increased pain with standing and walking. She states that she can feel the bones rubbing together. She continues to follow up with Bariatrics. She is actively trying to lose weight. She continues to follow up with GI. She is hoping to pursue gastric bypass. She continues to take Gabapentin with benefit. She continues to take Percocet as needed with benefit and without adverse effects. She denies any other health changes. Her pain today is 6/10.    Interval History 7/27/2021:  DARCY Coats presents to the clinic for a follow-up appointment. Since the last visit, DARCY Coats states the pain has been worsening. Current pain intensity is 7/10. Patient had an RFA in the left knee in January 2021. She had really good relief until about June. She was given an hyaulorinic injection at that time. She had good relief for a few weeks but now she is back to the same amount of pain as before that injection. She is having pain with standing and walking. She is continuing to take percocet and it completely gets rid of her pain      Of note, patient was found to have gastric cancer. She is currently on chemotherapy. The plan is to continue chemo until the cancer can be surgically removed.     Interval History 9/14/2021:  The patient returns to clinic today for follow up of knee pain  via audio visit. She is s/p left genicular RFA on 7/30/2021. She reports 50% relief of her knee pain. She continues to report left knee pain with prolonged standing and walking. Since last visit, she has developed a breast abscess. This was drained yesterday. She is on antibiotics. She continues to undergo chemotherapy for gastric cancer. She continues to take Gabapentin with benefit. She continues to take Percocet with benefit and without adverse effects. She denies any other health changes. Her pain today is 2/10.     Interval History 11/9/2021:  The patient returns to clinic today for follow up of knee pain. She reports increased left knee pain, worse with standing and walking. Since last visit, she was admitted to the hospital for diarrhea and dehydration. She continues to undergo chemotherapy treatment for gastric cancer. She continues to take Gabapentin with benefit. She continues to take Percocet as needed with benefit and without adverse effects. She denies any other health changes. Her pain today is 7/10.       Interval History 12/28/2021:  The patient returns to clinic today for follow up of knee pain. She is s/p left knee Monovisc injection on 12/14/2021. She reports 70% relief of her left knee pain. She continues to report intermittent left knee pain, worse with standing and walking. She reports bilateral feet swelling, left greater than right. She has not seen her PCP. She has completed chemotherapy. She is scheduled for EGD in January to monitor tumor size with possible resection. She will likely undergo chemotherapy again after the scope. She continues to take Gabapentin with benefit. She continues to take Percocet as needed with benefit and without adverse effects. She denies any other health changes. Her pain today is 0/10.    Interval History 3/21/2022:  The patient returns to clinic today for follow up of knee pain. She report increased left knee pain, worse with prolonged standing and walking.  She did have repeat EGD with biopsies done. She is currently undergoing chemotherapy and radiation. She continues to take Percocet as needed with benefit and without adverse effects. She denies any other health changes. Her pain today is 0/10.    Interval History 5/16/2022:  The patient returns to clinic today for follow up of knee pain. She is s/p left genicular RFA on 4/22/2022. She reports 80% relief of her knee pain. She continues to report intermittent left knee pain. Her pain is worse with prolonged activity. She has completed radiation and chemotherapy. She is awaiting further testing to determine plan. She continues to take Percocet as needed with benefit and without adverse effects. She denies any other health changes. Her pain today is 2/10.    Interval History 8/16/2022:  The patient returns to clinic today for follow up of knee pain. She continues to report left knee pain. Her pain is much improved since RFA. Her pain is worse with prolonged standing and walking. She reports intermittent right lower leg pain, below her knee. She continues to follow up with Hem/Onc for gastric cancer. She continues to report benefit with Percocet. This allows her to perform her ADLs. She denies any adverse effects. She denies any other health changes. Her pain today is 0/10.    Interval History 11/16/2022:  The patient returns to clinic today for follow up of knee pain. She reports increased left knee pain over the last few weeks. Her pain is worse with prolonged standing, walking, and activity. She denies any right knee pain. She continues to follow up with Hem/Onc. She continues to report benefit with current medication regimen. She continues to take Gabapentin. She continues to take Percocet as needed with benefit and without adverse effects. She denies any other health changes. Her pain today is 6/10.      Pain Disability Index Review:  Last 3 PDI Scores 11/16/2022 8/16/2022 3/21/2022   Pain Disability Index (PDI) 27  0 17         Physical Therapy: Yes, she did this in 2018 for one month (twice a week). She does HEP (stretching and ROM in the morning)      Pain Medications:    Current:  Gabapentin, tylenol 500mg Q6 prn, Percocet 5/325 mg TID PRN    Opioid Contract: yes     report:  Reviewed and consistent with medication use as prescribed.    Pain Procedures:   5/8/2018- Left knee Synvisc One injection  7/10/2018- Left genicular nerve block  7/31/2018- Left genicular cooled RFA  12/21/2018- Left knee steroid injection  1/31/2019- Left knee Euflexxa series  3/19/2019 Left genicular cooled RFA- 90% relief  7/19/19- Left knee genicular phenol chemodenervation-  6/19/2020- Left genicular RFA  1/22/2021- Left genicular RFA  6/2021- Left knee Orthovisc injection  7/30/2021- Left genicular RFA  12/14/2021- Left knee Monovisc injection  4/22/2022- Left genicular RFA    Physical Therapy//Home Exercise: yes, she did this in 2018 for one month (twice a week). She does HEP (stretching and ROM in the morning)    Imaging:   MRI Left Knee 7/21/2017 (in media):  The medial meniscus is intact. The lateral meniscus is intact.      A chronic complete ACL tear is noted. The posterior cruciate ligament is intact. The medial and lateral retinacula are intact. The medial collateral ligament is intact. The lateral collateral ligament in intact. The posterolateral corner structures are intact.      There is full thickness fissuring of the central aspect medial femoral cartilage. The lateral tibiofemoral compartment cartilage is intact. There is broad thickness defect within the central trochlear cartilage. There is mild lateral patellofemoral cartilage thinning and regional full thickness loss of the central superior patellar cartilage.      A small effusion is present. There is trace infrapatellar fluid. Tiny popliteal cyst is noted. Subtle focal marrow edema is seen within the posterior tibial plateau. The bone marrow signal is heterogeneous likely  reflecting marrow reconversion.      There is trace pes anserine bursitis. The tendons are otherwise normal.      Grade 2 fatty streaking of the semimembranosus and vastus lateralis muscles are noted. There is mild prepatellar edema.      MRI Right Shoulder 7/21/2017 (in media):   There is a complete tear of the supraspinatus tendon with retraction to the glenohumeral joint. There is partial tearing of the anterior infraspinatus articular surface. Mild subscapularis tendinosis is noted. The teres minor is intact. A small amount of subacromial/subdeltoid fluid is noted. The proximal long head biceps tendon is poorly visualized proximally although intact distally.      Degenerative tearing of the anterosuperior through posterosuperior labrum is noted. There is moderate to severe superior glenoid cartilage thinning with subchondral degenerative changes. The glenohumeral ligaments appear grossly intact.      An os acromiale is seen with fluid and osteophytosis at its junction with the base of the acromion. There is moderate superior subluxation of the AC joint with mild osteophytosis.      Small effusion with subcoracoid extension is noted.      There is grade 2/3 fatty atrophy of the supraspinatus muscle, grade 2 of the infraspinatus and subscapularis. Heterogeneous signal is seen throughout the bone marrow likely reflecting marrow reconversion.        Allergies: Review of patient's allergies indicates:  No Known Allergies    Current Medications:   Current Outpatient Medications   Medication Sig Dispense Refill    acetaminophen (TYLENOL) 500 MG tablet       amLODIPine (NORVASC) 10 MG tablet TAKE 1 TABLET ONE TIME DAILY 90 tablet 1    atorvastatin (LIPITOR) 20 MG tablet TAKE 1 TABLET EVERY DAY 90 tablet 11    B-complex with vitamin C (Z-BEC OR EQUIV) tablet Take 1 tablet by mouth once daily.       CALCIUM CITRATE-VITAMIN D2 ORAL Take 1 tablet by mouth once daily.       citalopram (CELEXA) 10 MG tablet TAKE 1 TABLET  EVERY DAY 90 tablet 1    dextran 70-hypromellose (TEARS) ophthalmic solution Place 1 drop into the right eye every 6 (six) hours. 30 mL 0    ELIQUIS 5 mg Tab TAKE 1 TABLET TWICE DAILY 180 tablet 2    ferrous gluconate (FERGON) 324 MG tablet TAKE 1 TABLET (324 MG TOTAL) BY MOUTH DAILY WITH BREAKFAST. 90 tablet 11    furosemide (LASIX) 80 MG tablet Take 1 tablet (80 mg total) by mouth 2 (two) times a day. 60 tablet 11    gabapentin (NEURONTIN) 300 MG capsule Take 2 capsules (600 mg total) by mouth 3 (three) times daily. 540 capsule 2    ibuprofen (ADVIL,MOTRIN) 600 MG tablet Take 1 tablet (600 mg total) by mouth every 8 (eight) hours as needed for Pain. 10 tablet 0    lactulose (CHRONULAC) 10 gram/15 mL solution Take 30 mLs (20 g total) by mouth 2 (two) times daily as needed (constipation). 120 mL 1    LIDOcaine-prilocaine (EMLA) cream Apply topically as needed (prior to port access). 30 g 0    lisinopriL (PRINIVIL,ZESTRIL) 40 MG tablet TAKE 1 TABLET ONE TIME DAILY 90 tablet 1    multivitamin with minerals tablet Take 1 tablet by mouth once daily.       oxyCODONE-acetaminophen (PERCOCET) 5-325 mg per tablet Take 1 tablet by mouth every 8 (eight) hours as needed for Pain. 90 tablet 0    OZEMPIC 1 mg/dose (4 mg/3 mL) INJECT 1 MG INTO THE SKIN EVERY 7 DAYS. 9 pen 11    pantoprazole (PROTONIX) 40 MG tablet Take 1 tablet (40 mg total) by mouth Daily. 90 tablet 3     No current facility-administered medications for this visit.     Facility-Administered Medications Ordered in Other Visits   Medication Dose Route Frequency Provider Last Rate Last Admin    0.9%  NaCl infusion   Intravenous Continuous Tevin Clark MD   Stopped at 01/13/22 1055       REVIEW OF SYSTEMS:    GENERAL:  No weight loss, malaise or fevers.  HEENT:  Negative for frequent or significant headaches.  NECK:  Negative for lumps, goiter, pain and significant neck swelling.  RESPIRATORY:  Negative for cough, wheezing or shortness of breath.  +LAURA  CARDIOVASCULAR:  Negative for chest pain, leg swelling or palpitations. HTN  GI:  Negative for abdominal discomfort, blood in stools or black stools or change in bowel habits. +GERD, gastric cancer actively in chemo  MUSCULOSKELETAL:  See HPI  SKIN:  Negative for lesions, rash, and itching.  PSYCH:  Positive for  mood disorder and recent psychosocial stressors. +sleep disturbance  HEMATOLOGY/LYMPHOLOGY:  Negative for prolonged bleeding, bruising easily or swollen nodes.  NEURO:   No history of headaches, syncope, paralysis, seizures or tremors.  ENDO: Thyroid disorder.   All other reviewed and negative other than HPI.    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    GERD (gastroesophageal reflux disease)     Glaucoma suspect     History of gastric ulcer     History of psychiatric hospitalization     Hx of psychiatric care     Celexa, no recall of charted Effexor, Lexapro, Desyrel prescriptions    Hyperlipidemia     Hypertension     Hypothyroidism     Morbid obesity     Neuromuscular disorder     Sleep apnea     Thyroid disease        Past Surgical History:  Past Surgical History:   Procedure Laterality Date    APPENDECTOMY      CARDIAC SURGERY      CATARACT EXTRACTION W/  INTRAOCULAR LENS IMPLANT Bilateral     MFIOL OU    CORONARY ANGIOGRAPHY Bilateral 2/24/2021    Procedure: ANGIOGRAM, CORONARY ARTERY;  Surgeon: Cresencio Ridley MD;  Location: [facility] CATH LAB;  Service: Cardiology;  Laterality: Bilateral;  Covid test needed - ok per Danay SSCU. Pt. w/o transportation or family    ENDOSCOPIC ULTRASOUND OF UPPER GASTROINTESTINAL TRACT N/A 3/17/2021    Procedure: ULTRASOUND, UPPER GI TRACT, ENDOSCOPIC;  Surgeon: Gerald Anaya MD;  Location: [facility] ENDO ([facility]  FLR);  Service: Endoscopy;  Laterality: N/A;  Pt unable to get a ride for swab. Will do rapid test at 8:30 - ttr    ENDOSCOPIC ULTRASOUND OF UPPER GASTROINTESTINAL TRACT N/A 1/13/2022    Procedure: ULTRASOUND, UPPER GI TRACT, ENDOSCOPIC;  Surgeon: Kevin Carballo MD;  [facility]  Service: Endoscopy;  Laterality: N/A;  blood thinner approval received, see telephone encounter 1/7/22-BB  rapid  instructions given verbally and sent to address on file in pt's chart-BB    ENDOSCOPIC ULTRASOUND OF UPPER GASTROINTESTINAL TRACT N/A 10/11/2022    Procedure: ULTRASOUND, UPPER GI TRACT, ENDOSCOPIC;  Surgeon: Dequan Ridley MD;  Location: [facility] (2ND FLR);  Service: Endoscopy;  Laterality: N/A;    ESOPHAGOGASTRODUODENOSCOPY N/A 2/5/2021    Procedure: EGD (ESOPHAGOGASTRODUODENOSCOPY);  Surgeon: Matthew Hernadez MD;  [facility]  Service: Endoscopy;  Laterality: N/A;  BMI-58    Wt: 361#     COVID test at [facility] on 2/2-GT    ESOPHAGOGASTRODUODENOSCOPY N/A 3/17/2021    Procedure: EGD (ESOPHAGOGASTRODUODENOSCOPY);  Surgeon: Gerald Anaya MD;  [facility]  Service: Endoscopy;  Laterality: N/A;    ESOPHAGOGASTRODUODENOSCOPY N/A 5/25/2021    Procedure: EGD (ESOPHAGOGASTRODUODENOSCOPY);  Surgeon: Kevin Carballo MD;  [facility]  Service: Endoscopy;  Laterality: N/A;  ESD 2 hours  Full COVID vaccination on file. ttr    ESOPHAGOGASTRODUODENOSCOPY N/A 1/13/2022    Procedure: EGD (ESOPHAGOGASTRODUODENOSCOPY);  Surgeon: Kevin Carballo MD;  [address]  Service: Endoscopy;  Laterality: N/A;  blood thinner approval, see telephone encounter 1/7/22-BB  rapid  instructions given verbally and sent to address on file in pt's chart-BB    ESOPHAGOGASTRODUODENOSCOPY N/A 10/11/2022    Procedure: EGD (ESOPHAGOGASTRODUODENOSCOPY);  Surgeon: Dequan Ridley MD;  Location: [facility] ([address] [place] [address]);  Service: Endoscopy;  Laterality: N/A;  She is do for  EGD/EUS now. Personal history of gastric cancer. Ca Coats #[id number].   Thanks,   Kevin Fuentes MD    Pt is fully vaccinated-DS  9/14/22-Approval to hold Davian rec'd from Dr. Pelaez (see telephone encounter 9/14/22)-DS  9/14/22-Ins    EYE SURGERY      INJECTION OF ANESTHETIC AGENT AROUND NERVE Left 7/10/2018    Procedure: BLOCK, NERVE;  Surgeon: Samantha Vidal MD;  Location: [facility] PAIN MGT;  Service: Pain Management;  Laterality: Left;  Genicular     INJECTION OF ANESTHETIC AGENT AROUND NERVE Left 7/19/2019    Procedure: Block, Nerve NERVE BLOCK GENICULAR WITH PHENOL 6%;  Surgeon: Samantha Vidal MD;  Location: [facility] PAIN MGT;  Service: Pain Management;  Laterality: Left;  NEEDS CONSENT    INSERTION OF TUNNELED CENTRAL VENOUS CATHETER (CVC) WITH SUBCUTANEOUS PORT N/A 7/9/2021    Procedure: INSERTION, PORT-A-CATH;  Surgeon: Mario Paz MD;  Location: [facility] CATH LAB;  Service: Radiology;  Laterality: N/A;  PORT PLACEMENT    RADIOFREQUENCY ABLATION Left 6/19/2020    Procedure: RADIOFREQUENCY ABLATION LEFT GENICULAR;  Surgeon: Tevin Clark MD;  Location: [facility] PAIN MGT;  Service: Pain Management;  Laterality: Left;  Left Genicular RFA    RADIOFREQUENCY ABLATION Left 1/22/2021    Procedure: RADIOFREQUENCY ABLATION LEFT GENICULAR;  Surgeon: Tevin Clark MD;  Location: [facility] PAIN MGT;  Service: Pain Management;  Laterality: Left;    RADIOFREQUENCY ABLATION Left 7/30/2021    Procedure: RADIOFREQUENCY ABLATION, GENICULAR COOLED NEED CONSENT;  Surgeon: Tevin Clark MD;  Location: [facility] PAIN MGT;  Service: Pain Management;  Laterality: Left;    RADIOFREQUENCY ABLATION Left 4/22/2022    Procedure: RADIOFREQUENCY ABLATION, GENICULAR COOLED , LEFT;  Surgeon: Tevin Clark MD;  Location: [facility] PAIN MGT;  Service: Pain Management;  Laterality: Left;    TONSILLECTOMY         Family History:  Family History   Problem Relation Age of Onset    No Known Problems Mother     No Known Problems Father     Colon  "cancer Neg Hx     Esophageal cancer Neg Hx        Social History:  Social History     Socioeconomic History    Marital status:     Number of children: 2   Occupational History     Comment: retired  and cook   Tobacco Use    Smoking status: Some Days     Years: 50.00     Types: Cigarettes    Smokeless tobacco: Never    Tobacco comments:     currently smoking <5 cigarettes most days   Substance and Sexual Activity    Alcohol use: Yes     Comment: meg    Drug use: No    Sexual activity: Not Currently     Partners: Male   Social History Narrative    2 children (dtr in area, son in [place]) and she has 3 grandkids (in [place]),    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    Originally from [facility]       OBJECTIVE:    /78 (BP Location: Left arm, Patient Position: Sitting, BP Method: Large (Automatic))   Pulse 70   Resp 18   Ht 5' 6" (1.676 m)   Wt 124.7 kg (275 lb)   SpO2 100%   BMI 44.39 kg/m²     PHYSICAL EXAMINATION:    General appearance: Well appearing, in no acute distress, alert and oriented x3.  Psych:  Mood and affect appropriate.  Skin: Skin color, texture, turgor normal, no rashes or lesions, in both upper and lower body.  Head/face:  Atraumatic, normocephalic.   Pulm: Symmetric chest rise, no respiratory distress noted.   Extremities:  No deformities or skin discoloration. Good capillary refill. +2 pitting edema to bilateral feet.   Musculoskeletal: There is pain with palpation over medial and lateral joint line of left knee. Limited ROM with pain on extension of left knee. Crepitus noted to left knee. Right knee maneuvers are negative. No atrophy or tone abnormalities are noted.  Neuro:  Plantar response are downgoing. No loss of sensation is noted.  Gait: Antalgic- ambulates with wheelchair.     ASSESSMENT: 71 y.o. year old female with left knee pain, consistent with the followin. Chronic pain of left knee  gabapentin (NEURONTIN) 300 MG capsule    Procedure Order to " Pain Management      2. Primary osteoarthritis of left knee  gabapentin (NEURONTIN) 300 MG capsule      3. Chronic pain disorder  gabapentin (NEURONTIN) 300 MG capsule      4. Gastric adenocarcinoma                PLAN:     - Previous imaging reviewed today. Labs reviewed.     - Schedule for left genicular RFA.     - We can repeat left knee Monovisc injection as needed.     - Continue follow up with Oncology.     - Pain contract signed 8/11/2020.    - Continue Percocet 5/325 mg TID PRN. Refill sent with appropriate date.     - The patient is here today for a refill of current pain medications and they believe these provide effective pain control and improvements in quality of life.  The patient notes no serious side effects, and feels the benefits outweigh the risks.  The patient was reminded of the pain contract that they signed previously as well as the risks and benefits of the medication including possible death.  The updated [place] Board of Pharmacy prescription monitoring program was reviewed, and the patient has been found to be compliant with current treatment plan. Medication management provided by Dr. Clark.     - UDS from 8/16/2022 reviewed and consistent.     - Continue Gabapentin.     - I have stressed the importance of physical activity and a home exercise plan to help with pain and improve health.    - RTC 3 weeks after above procedure.     The above plan and management options were discussed at length with patient. Patient is in agreement with the above and verbalized understanding.    Savanna Hyatt NP  11/16/2022

## 2022-11-17 ENCOUNTER — TELEPHONE (OUTPATIENT)
Dept: PAIN MEDICINE | Facility: OTHER | Age: 72
End: 2022-11-17
Payer: MEDICARE

## 2022-11-17 NOTE — TELEPHONE ENCOUNTER
----- Message from Cathy Pelaez MD sent at 11/16/2022  5:15 PM CST -----  Regarding: RE: Clearance  Yes . thanks  ----- Message -----  From: Ashley Martinez  Sent: 11/16/2022  10:54 AM CST  To: Ashley Anderson, Cathy Pelaez MD  Subject: Clearance                                        Requesting clearance to hold Eliquis for 3 days to have Left genicular RFA with Dr Clark. Please advise.     Thank you

## 2022-11-18 RX ORDER — PANTOPRAZOLE SODIUM 40 MG/1
40 TABLET, DELAYED RELEASE ORAL DAILY
Qty: 90 TABLET | Refills: 0 | Status: SHIPPED | OUTPATIENT
Start: 2022-11-18 | End: 2023-02-02

## 2022-11-18 NOTE — TELEPHONE ENCOUNTER
No new care gaps identified.  [facility] Embedded Care Gaps. Reference number: [id number]. 11/18/2022   1:13:35 PM CST

## 2022-11-18 NOTE — TELEPHONE ENCOUNTER
----- Message from Susan Chen sent at 11/18/2022 11:38 AM CST -----  Contact: Patient @ [phone]  Requesting an RX refill or new RX.  Is this a refill or new RX:   RX name and strength pantoprazole (PROTONIX) 40 MG tablet      Is this a 30 day or 90 day RX: 90  Pharmacy name and phone # [facility] - [place], [place] - [address]     The doctors have asked that we provide their patients with the following 2 reminders -- prescription refills can take up to 72 hours, and a friendly reminder that in the future you can use your [facility] account to request refills: YES

## 2022-11-28 ENCOUNTER — OFFICE VISIT (OUTPATIENT)
Dept: INTERNAL MEDICINE | Facility: CLINIC | Age: 72
End: 2022-11-28
Payer: MEDICARE

## 2022-11-28 VITALS
WEIGHT: 270.31 LBS | DIASTOLIC BLOOD PRESSURE: 74 MMHG | SYSTOLIC BLOOD PRESSURE: 138 MMHG | BODY MASS INDEX: 43.44 KG/M2 | OXYGEN SATURATION: 96 % | RESPIRATION RATE: 18 BRPM | HEIGHT: 66 IN | HEART RATE: 85 BPM

## 2022-11-28 DIAGNOSIS — I10 ESSENTIAL HYPERTENSION: ICD-10-CM

## 2022-11-28 DIAGNOSIS — M75.82 ROTATOR CUFF TENDONITIS, LEFT: Primary | ICD-10-CM

## 2022-11-28 PROCEDURE — 99214 OFFICE O/P EST MOD 30 MIN: CPT | Mod: S$GLB,,, | Performed by: INTERNAL MEDICINE

## 2022-11-28 PROCEDURE — 1159F PR MEDICATION LIST DOCUMENTED IN MEDICAL RECORD: ICD-10-PCS | Mod: CPTII,S$GLB,, | Performed by: INTERNAL MEDICINE

## 2022-11-28 PROCEDURE — 3288F PR FALLS RISK ASSESSMENT DOCUMENTED: ICD-10-PCS | Mod: CPTII,S$GLB,, | Performed by: INTERNAL MEDICINE

## 2022-11-28 PROCEDURE — 1160F PR REVIEW ALL MEDS BY PRESCRIBER/CLIN PHARMACIST DOCUMENTED: ICD-10-PCS | Mod: CPTII,S$GLB,, | Performed by: INTERNAL MEDICINE

## 2022-11-28 PROCEDURE — 1125F AMNT PAIN NOTED PAIN PRSNT: CPT | Mod: CPTII,S$GLB,, | Performed by: INTERNAL MEDICINE

## 2022-11-28 PROCEDURE — 3008F BODY MASS INDEX DOCD: CPT | Mod: CPTII,S$GLB,, | Performed by: INTERNAL MEDICINE

## 2022-11-28 PROCEDURE — 99214 PR OFFICE/OUTPT VISIT, EST, LEVL IV, 30-39 MIN: ICD-10-PCS | Mod: S$GLB,,, | Performed by: INTERNAL MEDICINE

## 2022-11-28 PROCEDURE — 3078F DIAST BP <80 MM HG: CPT | Mod: CPTII,S$GLB,, | Performed by: INTERNAL MEDICINE

## 2022-11-28 PROCEDURE — 1160F RVW MEDS BY RX/DR IN RCRD: CPT | Mod: CPTII,S$GLB,, | Performed by: INTERNAL MEDICINE

## 2022-11-28 PROCEDURE — 99999 PR PBB SHADOW E&M-EST. PATIENT-LVL V: ICD-10-PCS | Mod: PBBFAC,,, | Performed by: INTERNAL MEDICINE

## 2022-11-28 PROCEDURE — 3075F SYST BP GE 130 - 139MM HG: CPT | Mod: CPTII,S$GLB,, | Performed by: INTERNAL MEDICINE

## 2022-11-28 PROCEDURE — 1101F PR PT FALLS ASSESS DOC 0-1 FALLS W/OUT INJ PAST YR: ICD-10-PCS | Mod: CPTII,S$GLB,, | Performed by: INTERNAL MEDICINE

## 2022-11-28 PROCEDURE — 4010F ACE/ARB THERAPY RXD/TAKEN: CPT | Mod: CPTII,S$GLB,, | Performed by: INTERNAL MEDICINE

## 2022-11-28 PROCEDURE — 1159F MED LIST DOCD IN RCRD: CPT | Mod: CPTII,S$GLB,, | Performed by: INTERNAL MEDICINE

## 2022-11-28 PROCEDURE — 1125F PR PAIN SEVERITY QUANTIFIED, PAIN PRESENT: ICD-10-PCS | Mod: CPTII,S$GLB,, | Performed by: INTERNAL MEDICINE

## 2022-11-28 PROCEDURE — 99999 PR PBB SHADOW E&M-EST. PATIENT-LVL V: CPT | Mod: PBBFAC,,, | Performed by: INTERNAL MEDICINE

## 2022-11-28 PROCEDURE — 3078F PR MOST RECENT DIASTOLIC BLOOD PRESSURE < 80 MM HG: ICD-10-PCS | Mod: CPTII,S$GLB,, | Performed by: INTERNAL MEDICINE

## 2022-11-28 PROCEDURE — 4010F PR ACE/ARB THEARPY RXD/TAKEN: ICD-10-PCS | Mod: CPTII,S$GLB,, | Performed by: INTERNAL MEDICINE

## 2022-11-28 PROCEDURE — 1101F PT FALLS ASSESS-DOCD LE1/YR: CPT | Mod: CPTII,S$GLB,, | Performed by: INTERNAL MEDICINE

## 2022-11-28 PROCEDURE — 3075F PR MOST RECENT SYSTOLIC BLOOD PRESS GE 130-139MM HG: ICD-10-PCS | Mod: CPTII,S$GLB,, | Performed by: INTERNAL MEDICINE

## 2022-11-28 PROCEDURE — 3008F PR BODY MASS INDEX (BMI) DOCUMENTED: ICD-10-PCS | Mod: CPTII,S$GLB,, | Performed by: INTERNAL MEDICINE

## 2022-11-28 PROCEDURE — 3288F FALL RISK ASSESSMENT DOCD: CPT | Mod: CPTII,S$GLB,, | Performed by: INTERNAL MEDICINE

## 2022-11-28 RX ORDER — AMLODIPINE BESYLATE 10 MG/1
TABLET ORAL
Qty: 90 TABLET | Refills: 11 | Status: SHIPPED | OUTPATIENT
Start: 2022-11-28 | End: 2023-12-11

## 2022-11-28 RX ORDER — LISINOPRIL 40 MG/1
TABLET ORAL
Qty: 90 TABLET | Refills: 11 | Status: SHIPPED | OUTPATIENT
Start: 2022-11-28 | End: 2023-12-11

## 2022-11-28 NOTE — PROGRESS NOTES
Subjective:       Patient ID: Ca Coats is a 71 y.o. female.    Chief Complaint: Follow-up    Patient is here for followup for chronic conditions.    L sided neck pain.    She will be getting dentures in 1 week, after recent tooth removal.    No other new health issues.    Review of Systems   Constitutional:  Negative for activity change, appetite change and unexpected weight change.   Eyes:  Negative for visual disturbance.   Respiratory:  Negative for cough, chest tightness and shortness of breath.    Cardiovascular:  Negative for chest pain.   Gastrointestinal:  Negative for abdominal distention and abdominal pain.   Genitourinary:  Negative for difficulty urinating and urgency.        No breast lumps/masses/pain/nipple d/c in either breast     Musculoskeletal:  Positive for arthralgias and neck pain.   Skin:  Negative for rash and wound.   Psychiatric/Behavioral:  Positive for dysphoric mood. Negative for confusion.          Objective:      Physical Exam  Vitals reviewed.   Constitutional:       General: She is not in acute distress.     Appearance: Normal appearance. She is well-developed. She is obese. She is not ill-appearing, toxic-appearing or diaphoretic.      Comments: In wheelchair, diff to rise to exam table.  Here with her aide   HENT:      Head: Normocephalic and atraumatic.   Eyes:      General: No scleral icterus.     Pupils: Pupils are equal, round, and reactive to light.   Neck:      Thyroid: No thyromegaly.   Cardiovascular:      Rate and Rhythm: Normal rate and regular rhythm.      Heart sounds: Normal heart sounds. No murmur heard.    No friction rub. No gallop.   Pulmonary:      Effort: Pulmonary effort is normal. No respiratory distress.      Breath sounds: Normal breath sounds. No wheezing or rales.   Abdominal:      General: Bowel sounds are normal. There is no distension.      Palpations: Abdomen is soft. There is no mass.      Tenderness: There is no abdominal tenderness.  There is no guarding or rebound.   Musculoskeletal:         General: Tenderness present. Normal range of motion.      Cervical back: Normal range of motion.      Comments: Similar exam as previous --  Painful L shoulder arc rom.  Mild pain along the L shoulder w/o redness or warmth.  I am able to move L should with pain from PROM<<AROM  The L RC demonstrates weakness to resistance/open can testing   Lymphadenopathy:      Cervical: No cervical adenopathy.   Neurological:      General: No focal deficit present.      Mental Status: She is alert and oriented to person, place, and time.   Psychiatric:         Speech: Speech normal.         Behavior: Behavior normal.         Thought Content: Thought content normal.       Assessment:       1. Rotator cuff tendonitis, left    2. Essential hypertension        Plan:       Ca was seen today for follow-up.    Diagnoses and all orders for this visit:    Rotator cuff tendonitis, left  -     Ambulatory referral/consult to Physical/Occupational Therapy; Future    Essential hypertension  -     lisinopriL (PRINIVIL,ZESTRIL) 40 MG tablet; TAKE 1 TABLET ONE TIME DAILY  -     amLODIPine (NORVASC) 10 MG tablet; TAKE 1 TABLET ONE TIME DAILY  controlled    Stomach cancer --  Appears to be in remission, has onc f/u    Health Maintenance         Date Due Completion Date    COVID-19 Vaccine (4 - Booster for Moderna series) 02/09/2022 12/15/2021    DEXA Scan 01/02/2023 1/2/2020    Mammogram 07/27/2023 7/27/2022    Hemoglobin A1c (Diabetic Prevention Screening) 06/30/2024 6/30/2021    Lipid Panel 12/03/2025 12/3/2020    Colorectal Cancer Screening 11/26/2028 11/26/2018    TETANUS VACCINE 01/02/2030 1/2/2020            Follow up in about 6 months (around 5/28/2023).    Future Appointments   Date Time Provider Department Center   12/14/2022  9:30 AM Dwight Medina PT [facility]   1/4/2023 10:00 AM LAB, [facility]   1/4/2023 11:00 AM Cathy Pelaez MD [facility] HEM ONC  [facility]   1/13/2023 10:20 AM Savanna Hyatt NP [facility]   2/16/2023  9:40 AM Savanna Hyatt NP [facility]   5/29/2023 10:40 AM Lei Garcia MD [facility]

## 2022-12-22 ENCOUNTER — PATIENT MESSAGE (OUTPATIENT)
Dept: PAIN MEDICINE | Facility: OTHER | Age: 72
End: 2022-12-22
Payer: MEDICARE

## 2022-12-23 ENCOUNTER — HOSPITAL ENCOUNTER (OUTPATIENT)
Facility: OTHER | Age: 72
Discharge: HOME | End: 2022-12-23
Attending: ANESTHESIOLOGY | Admitting: ANESTHESIOLOGY
Payer: MEDICARE

## 2022-12-23 VITALS
SYSTOLIC BLOOD PRESSURE: 136 MMHG | HEART RATE: 62 BPM | HEIGHT: 66 IN | TEMPERATURE: 98 F | DIASTOLIC BLOOD PRESSURE: 82 MMHG | BODY MASS INDEX: 42.75 KG/M2 | RESPIRATION RATE: 16 BRPM | WEIGHT: 266 LBS | OXYGEN SATURATION: 95 %

## 2022-12-23 DIAGNOSIS — M25.562 CHRONIC PAIN OF LEFT KNEE: Primary | ICD-10-CM

## 2022-12-23 DIAGNOSIS — M17.12 OSTEOARTHRITIS OF LEFT KNEE, UNSPECIFIED OSTEOARTHRITIS TYPE: ICD-10-CM

## 2022-12-23 DIAGNOSIS — G89.29 CHRONIC PAIN OF LEFT KNEE: Primary | ICD-10-CM

## 2022-12-23 DIAGNOSIS — G89.29 CHRONIC PAIN: ICD-10-CM

## 2022-12-23 PROCEDURE — 25000003 PHARM REV CODE 250: Performed by: ANESTHESIOLOGY

## 2022-12-23 PROCEDURE — 64624 DSTRJ NULYT AGT GNCLR NRV: CPT | Mod: HCNC,LT,, | Performed by: ANESTHESIOLOGY

## 2022-12-23 PROCEDURE — 25000003 PHARM REV CODE 250: Performed by: STUDENT IN AN ORGANIZED HEALTH CARE EDUCATION/TRAINING PROGRAM

## 2022-12-23 PROCEDURE — 63600175 PHARM REV CODE 636 W HCPCS: Performed by: ANESTHESIOLOGY

## 2022-12-23 PROCEDURE — 99152 MOD SED SAME PHYS/QHP 5/>YRS: CPT | Mod: HCNC,,, | Performed by: ANESTHESIOLOGY

## 2022-12-23 PROCEDURE — 99152 MOD SED SAME PHYS/QHP 5/>YRS: CPT | Mod: HCNC | Performed by: ANESTHESIOLOGY

## 2022-12-23 PROCEDURE — A4649 SURGICAL SUPPLIES: HCPCS | Mod: HCNC | Performed by: ANESTHESIOLOGY

## 2022-12-23 PROCEDURE — 99152 PR MOD CONSCIOUS SEDATION, SAME PHYS, 5+ YRS, FIRST 15 MIN: ICD-10-PCS | Mod: HCNC,,, | Performed by: ANESTHESIOLOGY

## 2022-12-23 PROCEDURE — 64624 PR DESTRUCT, NEUROLYTIC AGENT, GENICULAR NERVE, W/IMG: ICD-10-PCS | Mod: HCNC,LT,, | Performed by: ANESTHESIOLOGY

## 2022-12-23 PROCEDURE — 64624 DSTRJ NULYT AGT GNCLR NRV: CPT | Mod: HCNC,LT | Performed by: ANESTHESIOLOGY

## 2022-12-23 RX ORDER — LIDOCAINE HYDROCHLORIDE 20 MG/ML
INJECTION, SOLUTION INFILTRATION; PERINEURAL
Status: DISCONTINUED | OUTPATIENT
Start: 2022-12-23 | End: 2022-12-23 | Stop reason: HOSPADM

## 2022-12-23 RX ORDER — MIDAZOLAM HYDROCHLORIDE 1 MG/ML
INJECTION INTRAMUSCULAR; INTRAVENOUS
Status: DISCONTINUED | OUTPATIENT
Start: 2022-12-23 | End: 2022-12-23 | Stop reason: HOSPADM

## 2022-12-23 RX ORDER — BUPIVACAINE HYDROCHLORIDE 2.5 MG/ML
INJECTION, SOLUTION EPIDURAL; INFILTRATION; INTRACAUDAL
Status: DISCONTINUED | OUTPATIENT
Start: 2022-12-23 | End: 2022-12-23 | Stop reason: HOSPADM

## 2022-12-23 RX ORDER — TRIAMCINOLONE ACETONIDE 40 MG/ML
INJECTION, SUSPENSION INTRA-ARTICULAR; INTRAMUSCULAR
Status: DISCONTINUED | OUTPATIENT
Start: 2022-12-23 | End: 2022-12-23 | Stop reason: HOSPADM

## 2022-12-23 RX ORDER — FENTANYL CITRATE 50 UG/ML
INJECTION, SOLUTION INTRAMUSCULAR; INTRAVENOUS
Status: DISCONTINUED | OUTPATIENT
Start: 2022-12-23 | End: 2022-12-23 | Stop reason: HOSPADM

## 2022-12-23 RX ORDER — SODIUM CHLORIDE 9 MG/ML
500 INJECTION, SOLUTION INTRAVENOUS CONTINUOUS
Status: DISCONTINUED | OUTPATIENT
Start: 2022-12-23 | End: 2022-12-23 | Stop reason: HOSPADM

## 2022-12-23 NOTE — DISCHARGE INSTRUCTIONS
Thank you for allowing us to care for you today. You may receive a survey about the care we provided. Your feedback is valuable and helps us provide excellent care throughout the community.     Home Care Instructions for Pain Management:    1. DIET:   You may resume your normal diet today.   2. BATHING:   You may shower with luke warm water. No tub baths or anything that will soak injection sites under water for the next 24 hours.  3. DRESSING:   You may remove your bandage today.   4. ACTIVITY LEVEL:   You may resume your normal activities 24 hrs after your procedure. Nothing strenuous today.  5. MEDICATIONS:   You may resume your normal medications today. To restart blood thinners, ask your doctor.  6. DRIVING    If you have received any sedatives by mouth today, you may not drive for 12 hours.    If you have received any sedation through your IV, you may not drive for 24 hrs.   7. SPECIAL INSTRUCTIONS:   No heat to the injection site for 24 hrs including, hot bath or shower, heating pad, moist heat, or hot tubs.    Use ice pack to injection site for any pain or discomfort.  Apply ice packs for 20 minute intervals as needed.    IF you have diabetes, be sure to monitor your blood sugar more closely. IF your injection contained steroids your blood sugar levels may become higher than normal.    If you are still having pain upon discharge:  Your pain may improve over the next 48 hours. The anesthetic (numbing medication) works immediately to 48 hours. IF your injection contained a steroid (anti-inflammatory medication), it takes approximately 3 days to start feeling relief and 7-10 days to see your greatest results from the medication. It is possible you may need subsequent injections. This would be discussed at your follow up appointment with pain management or your referring doctor.    Please call the PAIN MANAGEMENT office at [phone] or ON CALL pager at [phone] if you experienced any:   -Weakness or  loss of sensation  -Fever > 101.5  -Pain uncontrolled with oral medications   -Persistent nausea, vomiting, or diarrhea  -Redness or drainage from the injection sites, or any other worrisome concerns.   If physician on call was not reached or could not communicate with our office for any reason please go to the nearest emergency department. Adult Procedural Sedation Instructions    Recovery After Procedural Sedation (Adult)  You have been given medicine by vein to make you sleep during your surgery. This may have included both a pain medicine and sleeping medicine. Most of the effects have worn off. But you may still have some drowsiness for the next 6 to 8 hours.  Home care  Follow these guidelines when you get home:  For the next 8 hours, you should be watched by a responsible adult. This person should make sure your condition is not getting worse.  Don't drink any alcohol for the next 24 hours.  Don't drive, operate dangerous machinery, or make important business or personal decisions during the next 24 hours.  Note: Your healthcare provider may tell you not to take any medicine by mouth for pain or sleep in the next 4 hours. These medicines may react with the medicines you were given in the hospital. This could cause a much stronger response than usual.  Follow-up care  Follow up with your healthcare provider if you are not alert and back to your usual level of activity within 12 hours.  When to seek medical advice  Call your healthcare provider right away if any of these occur:  Drowsiness gets worse  Weakness or dizziness gets worse  Repeated vomiting  You can't be awakened   Date Last Reviewed: 10/18/2016  © 0252-7380 [employer], [employer]. [address] [place] [address], [place] [address]. All rights reserved. This information is not intended as a substitute for professional medical care. Always follow your healthcare professional's instructions.

## 2022-12-23 NOTE — H&P
HPI  Patient presenting for Procedure(s) (LRB):  RADIOFREQUENCY ABLATION LEFT GENICULAR COOLED CLEARED TO HOLD ELIQUIS 3 DAYS  NEEDS CONSENT (Left)     Patient on Anti-coagulation Yes    No health changes since previous encounter    Past Medical History:   Diagnosis Date    YOUNG (acute kidney injury) 10/15/2021    Anemia     2018    Arthritis     BMI 60.0-69.9, adult     Cataract     Chronic diastolic congestive heart failure 1/27/2021    Depression     Dry eye syndrome     Gastric adenocarcinoma 5/25/2021    GERD (gastroesophageal reflux disease)     Glaucoma suspect     History of gastric ulcer     History of psychiatric hospitalization     Hx of psychiatric care     Celexa, no recall of charted Effexor, Lexapro, Desyrel prescriptions    Hyperlipidemia     Hypertension     Hypothyroidism     Morbid obesity     Neuromuscular disorder     Sleep apnea     Thyroid disease      Past Surgical History:   Procedure Laterality Date    APPENDECTOMY      CARDIAC SURGERY      CATARACT EXTRACTION W/  INTRAOCULAR LENS IMPLANT Bilateral     MFIOL OU    CORONARY ANGIOGRAPHY Bilateral 2/24/2021    Procedure: ANGIOGRAM, CORONARY ARTERY;  Surgeon: Cresencio Ridley MD;  Location: [facility] CATH LAB;  Service: Cardiology;  Laterality: Bilateral;  Covid test needed - ok per Danay SSCU. Pt. w/o transportation or family    ENDOSCOPIC ULTRASOUND OF UPPER GASTROINTESTINAL TRACT N/A 3/17/2021    Procedure: ULTRASOUND, UPPER GI TRACT, ENDOSCOPIC;  Surgeon: Gerald Anaya MD;  Location: [facility] ([address] [place] [address]);  Service: Endoscopy;  Laterality: N/A;  Pt unable to get a ride for swab. Will do rapid test at 8:30 - ttr    ENDOSCOPIC ULTRASOUND OF UPPER GASTROINTESTINAL TRACT N/A 1/13/2022    Procedure: ULTRASOUND, UPPER GI TRACT, ENDOSCOPIC;  Surgeon: Kevin Carballo MD;  Location: [facility] ENDO (2ND FLR);  Service: Endoscopy;  Laterality: N/A;  blood thinner approval received, see telephone encounter 1/7/22-BB  rapid  instructions given verbally and  sent to address on file in pt's chart-BB    ENDOSCOPIC ULTRASOUND OF UPPER GASTROINTESTINAL TRACT N/A 10/11/2022    Procedure: ULTRASOUND, UPPER GI TRACT, ENDOSCOPIC;  Surgeon: Dequan Ridley MD;  Location: [facility] ENDO (2ND FLR);  Service: Endoscopy;  Laterality: N/A;    ESOPHAGOGASTRODUODENOSCOPY N/A 2/5/2021    Procedure: EGD (ESOPHAGOGASTRODUODENOSCOPY);  Surgeon: Matthew Hernadez MD;  Location: [facility] ENDO (2ND FLR);  Service: Endoscopy;  Laterality: N/A;  BMI-58    Wt: 361#     COVID test at [facility] on 2/2-GT    ESOPHAGOGASTRODUODENOSCOPY N/A 3/17/2021    Procedure: EGD (ESOPHAGOGASTRODUODENOSCOPY);  Surgeon: Gerald Anaya MD;  Location: [facility] ENDO (2ND FLR);  Service: Endoscopy;  Laterality: N/A;    ESOPHAGOGASTRODUODENOSCOPY N/A 5/25/2021    Procedure: EGD (ESOPHAGOGASTRODUODENOSCOPY);  Surgeon: Kevin Carballo MD;  Location: [facility] ENDO (2ND FLR);  Service: Endoscopy;  Laterality: N/A;  ESD 2 hours  Full COVID vaccination on file. ttr    ESOPHAGOGASTRODUODENOSCOPY N/A 1/13/2022    Procedure: EGD (ESOPHAGOGASTRODUODENOSCOPY);  Surgeon: Kevin Carballo MD;  Location: [facility] ENDO (2ND FLR);  Service: Endoscopy;  Laterality: N/A;  blood thinner approval, see telephone encounter 1/7/22-BB  rapid  instructions given verbally and sent to address on file in pt's chart-BB    ESOPHAGOGASTRODUODENOSCOPY N/A 10/11/2022    Procedure: EGD (ESOPHAGOGASTRODUODENOSCOPY);  Surgeon: Dequan Ridley MD;  Location: [facility] ENDO (2ND FLR);  Service: Endoscopy;  Laterality: N/A;  She is do for EGD/EUS now. Personal history of gastric cancer. Ca Nikhilemmett #[id number].   Thanks,   Kevin Fuentes MD    Pt is fully vaccinated-DS  9/14/22-Approval to hold Eliquis rec'd from Dr. Pelaez (see telephone encounter 9/14/22)-DS  9/14/22-Ins    EYE SURGERY      INJECTION OF ANESTHETIC AGENT AROUND NERVE Left 7/10/2018    Procedure: BLOCK, NERVE;  Surgeon: Samantha Vidal MD;  Location: [facility];  Service: Pain Management;  Laterality: Left;   "Genicular     INJECTION OF ANESTHETIC AGENT AROUND NERVE Left 7/19/2019    Procedure: Block, Nerve NERVE BLOCK GENICULAR WITH PHENOL 6%;  Surgeon: Samantha Vidal MD;  Location: [facility], [place], operated by [facility] PAIN MGT;  Service: Pain Management;  Laterality: Left;  NEEDS CONSENT    INSERTION OF TUNNELED CENTRAL VENOUS CATHETER (CVC) WITH SUBCUTANEOUS PORT N/A 7/9/2021    Procedure: INSERTION, PORT-A-CATH;  Surgeon: Mario Paz MD;  Location: [facility], [place], operated [facility] CATH LAB;  Service: Radiology;  Laterality: N/A;  PORT PLACEMENT    RADIOFREQUENCY ABLATION Left 6/19/2020    Procedure: RADIOFREQUENCY ABLATION LEFT GENICULAR;  Surgeon: Tevin Clark MD;  Location: [facility], [place], operated by [facility] PAIN MGT;  Service: Pain Management;  Laterality: Left;  Left Genicular RFA    RADIOFREQUENCY ABLATION Left 1/22/2021    Procedure: RADIOFREQUENCY ABLATION LEFT GENICULAR;  Surgeon: Tevin Clark MD;  Location: [facility], [place], operated [facility] PAIN MGT;  Service: Pain Management;  Laterality: Left;    RADIOFREQUENCY ABLATION Left 7/30/2021    Procedure: RADIOFREQUENCY ABLATION, GENICULAR COOLED NEED CONSENT;  Surgeon: Tevin Clark MD;  Location: [facility], [place], operated [facility] PAIN MGT;  Service: Pain Management;  Laterality: Left;    RADIOFREQUENCY ABLATION Left 4/22/2022    Procedure: RADIOFREQUENCY ABLATION, GENICULAR COOLED , LEFT;  Surgeon: Tevin Clark MD;  Location: [facility], [place], operated [facility] PAIN MGT;  Service: Pain Management;  Laterality: Left;    TONSILLECTOMY       Review of patient's allergies indicates:  No Known Allergies   Current Facility-Administered Medications   Medication    0.9%  NaCl infusion    BUPivacaine (PF) 0.25% (2.5 mg/ml) injection    LIDOcaine HCL 20 mg/ml (2%) injection    triamcinolone acetonide injection     Facility-Administered Medications Ordered in Other Encounters   Medication    0.9%  NaCl infusion       PMHx, PSHx, Allergies, Medications reviewed in epic    ROS negative except pain complaints in HPI    OBJECTIVE:    /69   Pulse 81   Temp 98.4 °F (36.9 °C) (Oral)   Resp 18   Ht 5' 6" (1.676 m)   Wt " 120.7 kg (266 lb)   SpO2 97%   Breastfeeding No   BMI 42.93 kg/m²     PHYSICAL EXAMINATION:    GENERAL: Well appearing, in no acute distress, alert and oriented x3.  PSYCH:  Mood and affect appropriate.  SKIN: Skin color, texture, turgor normal, no rashes or lesions which will impact the procedure.  CV: RRR with palpation of the radial artery.  PULM: No evidence of respiratory difficulty, symmetric chest rise. Clear to auscultation.  NEURO: Cranial nerves grossly intact.    Plan:    Proceed with procedure as planned Procedure(s) (LRB):  RADIOFREQUENCY ABLATION LEFT GENICULAR COOLED CLEARED TO HOLD ELIQUIS 3 DAYS  NEEDS CONSENT (Left)    JAYSON FARIA  12/23/2022

## 2022-12-23 NOTE — OP NOTE
Therapeutic Genicular Cooled Nerve Radiofrequency Ablation under Fluoroscopy     The procedure, risks, benefits, and options were discussed with the patient. There are no contraindications to the procedure. The patent expressed understanding and agreed to the procedure. Informed written consent was obtained prior to the start of the procedure and can be found in the patient's chart.        PATIENT NAME: Ms. Ca Coats   MRN: [id number]     DATE OF PROCEDURE: 12/23/2022     PROCEDURE: Therapeutic Left Genicular Cooled Nerve Radiofrequency Ablation under Fluoroscopy    PRE-OP DIAGNOSIS: Chronic pain of left knee [M25.562, G89.29]    POST-OP DIAGNOSIS: Chronic pain of left knee [M25.562, G89.29]    PHYSICIAN: Tevin Clark MD    ASSISTANTS: Gerson Crocker MD    MEDICATIONS INJECTED:  Preservative-free Kenalog 40mg with 9cc of Bupivicaine 0.25%    LOCAL ANESTHETIC INJECTED:   Xylocaine 2%    SEDATION: Versed 2mg and Fentanyl 50mcg                                                                                                                                                                                     Conscious sedation ordered by M.D. Patient re-evaluation prior to administration of conscious sedation. No changes noted in patient's status from initial evaluation. The patient's vital signs were monitored by RN and patient remained hemodynamically stable throughout the procedure.    Event Time In   Sedation Start 0905   Sedation End 0921       ESTIMATED BLOOD LOSS:  None    COMPLICATIONS:  None     INTERVAL HISTORY: Patient has clinical findings of chronic knee pain. Patients has completed 2 previous diagnostic genicular nerve blocks with at least 80% relief for the expected duration of the local anesthetic utilized.     TECHNIQUE: Time-out was performed to identify the patient and procedure to be performed. With the patient laying in a supine position, the surgical area was prepped and draped in the usual  sterile fashion using ChloraPrep and fenestrated drape. Three target sites including the superior lateral genicular nerve where the lateral femoral shaft meets the epicondyle, the superior medial genicular nerve where the medial femoral shaft meets the epicondyle, and the inferior medial genicular nerve where the medial tibial shaft meets the epicondyle, were determined under fluoroscopic guidance. Skin anesthesia was achieved by injecting Lidocaine 2% over the injection sites. A 17 gauge, 50mm, 10mm active tip needle was then advanced under fluoroscopy in the AP and lateral views into the positions of the geniculate nerves at these levels. This was followed by motor testing at each of the nerves to ensure that there was no dorsiflexion of the foot. After negative aspiration for blood was confirmed, 1 mL of the lidocaine 2% listed above was injected slowly at each site. This was followed by cooled thermal lesioning at 80 degrees celsius for 150 seconds at each site. That was followed by slowly injecting 2 mL of the medication mixture listed above at each site. The needles were removed and bleeding was nil. A sterile dressing was applied. No specimens collected. The patient tolerated the procedure well and did not have any procedure related motor deficit at the conclusion of the procedure.    The patient was monitored after the procedure in the recovery area. They were given post-procedure and discharge instructions to follow at home. The patient was discharged in a stable condition.        Tevin Clark MD

## 2022-12-23 NOTE — DISCHARGE SUMMARY
Discharge Note  Short Stay      SUMMARY     Admit Date: 12/23/2022    Attending Physician: Tevin Clark      Discharge Physician: Tevin Clark      Discharge Date: 12/23/2022 9:22 AM    Procedure(s) (LRB):  RADIOFREQUENCY ABLATION LEFT GENICULAR COOLED CLEARED TO HOLD ELIQUIS 3 DAYS  NEEDS CONSENT (Left)    Final Diagnosis: Chronic pain of left knee [M25.562, G89.29]    Disposition: Home or self care    Patient Instructions:   Current Discharge Medication List        CONTINUE these medications which have NOT CHANGED    Details   acetaminophen (TYLENOL) 500 MG tablet       amLODIPine (NORVASC) 10 MG tablet TAKE 1 TABLET ONE TIME DAILY  Qty: 90 tablet, Refills: 11    Comments: .  Associated Diagnoses: Essential hypertension      atorvastatin (LIPITOR) 20 MG tablet TAKE 1 TABLET EVERY DAY  Qty: 90 tablet, Refills: 11    Associated Diagnoses: Mixed hyperlipidemia      B-complex with vitamin C (Z-BEC OR EQUIV) tablet Take 1 tablet by mouth once daily.       CALCIUM CITRATE-VITAMIN D2 ORAL Take 1 tablet by mouth once daily.       citalopram (CELEXA) 10 MG tablet TAKE 1 TABLET EVERY DAY  Qty: 90 tablet, Refills: 1    Associated Diagnoses: Adjustment disorder, unspecified type      dextran 70-hypromellose (TEARS) ophthalmic solution Place 1 drop into the right eye every 6 (six) hours.  Qty: 30 mL, Refills: 0    Associated Diagnoses: Subconjunctival hemorrhage of right eye      ELIQUIS 5 mg Tab TAKE 1 TABLET TWICE DAILY  Qty: 180 tablet, Refills: 2    Associated Diagnoses: DVT of deep femoral vein, right      ferrous gluconate (FERGON) 324 MG tablet TAKE 1 TABLET (324 MG TOTAL) BY MOUTH DAILY WITH BREAKFAST.  Qty: 90 tablet, Refills: 11      furosemide (LASIX) 80 MG tablet Take 1 tablet (80 mg total) by mouth 2 (two) times a day.  Qty: 60 tablet, Refills: 11      gabapentin (NEURONTIN) 300 MG capsule Take 2 capsules (600 mg total) by mouth 3 (three) times daily.  Qty: 540 capsule, Refills: 2    Associated Diagnoses:  Primary osteoarthritis of left knee; Chronic pain of left knee; Chronic pain disorder      ibuprofen (ADVIL,MOTRIN) 600 MG tablet Take 1 tablet (600 mg total) by mouth every 8 (eight) hours as needed for Pain.  Qty: 10 tablet, Refills: 0    Associated Diagnoses: Breast pain in female; Mastitis in female      lactulose (CHRONULAC) 10 gram/15 mL solution Take 30 mLs (20 g total) by mouth 2 (two) times daily as needed (constipation).  Qty: 120 mL, Refills: 1    Associated Diagnoses: Gastric adenocarcinoma; Constipation, unspecified constipation type      LIDOcaine-prilocaine (EMLA) cream Apply topically as needed (prior to port access).  Qty: 30 g, Refills: 0    Associated Diagnoses: Gastric adenocarcinoma      lisinopriL (PRINIVIL,ZESTRIL) 40 MG tablet TAKE 1 TABLET ONE TIME DAILY  Qty: 90 tablet, Refills: 11    Comments: .  Associated Diagnoses: Essential hypertension      multivitamin with minerals tablet Take 1 tablet by mouth once daily.       oxyCODONE-acetaminophen (PERCOCET) 5-325 mg per tablet Take 1 tablet by mouth every 8 (eight) hours as needed for Pain.  Qty: 90 tablet, Refills: 0    Comments: Quantity prescribed more than 7 day supply? Yes, quantity medically necessary  Associated Diagnoses: Chronic pain of left knee; Primary osteoarthritis of left knee; Gastric adenocarcinoma; Chronic pain disorder      OZEMPIC 1 mg/dose (4 mg/3 mL) INJECT 1 MG INTO THE SKIN EVERY 7 DAYS.  Qty: 9 pen, Refills: 11      pantoprazole (PROTONIX) 40 MG tablet Take 1 tablet (40 mg total) by mouth Daily.  Qty: 90 tablet, Refills: 0                 Discharge Diagnosis: Chronic pain of left knee [M25.562, G89.29]  Condition on Discharge: Stable with no complications to procedure   Diet on Discharge: Same as before.  Activity: as per instruction sheet.  Discharge to: Home with a responsible adult.  Follow up: 2-4 weeks

## 2022-12-27 DIAGNOSIS — G89.29 CHRONIC PAIN OF LEFT KNEE: ICD-10-CM

## 2022-12-27 DIAGNOSIS — M25.562 CHRONIC PAIN OF LEFT KNEE: ICD-10-CM

## 2022-12-27 DIAGNOSIS — C16.9 GASTRIC ADENOCARCINOMA: ICD-10-CM

## 2022-12-27 DIAGNOSIS — G89.4 CHRONIC PAIN DISORDER: ICD-10-CM

## 2022-12-27 DIAGNOSIS — M17.12 PRIMARY OSTEOARTHRITIS OF LEFT KNEE: ICD-10-CM

## 2022-12-27 RX ORDER — OXYCODONE AND ACETAMINOPHEN 5; 325 MG/1; MG/1
1 TABLET ORAL EVERY 8 HOURS PRN
Qty: 90 TABLET | Refills: 0 | Status: SHIPPED | OUTPATIENT
Start: 2022-12-27 | End: 2023-01-25 | Stop reason: SDUPTHER

## 2022-12-27 NOTE — TELEPHONE ENCOUNTER
----- Message from Rosita Noyola sent at 12/27/2022 10:39 AM CST -----  Regarding: Refill  Contact: LU RENAE [[id number]]  Type:  RX Refill Request    Who Called: Ms. Lu Tejedajonatanon    Refill or New Rx:Refill  RX Name and Strength:oxyCODONE-acetaminophen (PERCOCET) 5-325 mg per tablet  How is the patient currently taking it? (ex. 1XDay):every 8hrs   Is this a 30 day or 90 day RX:90 day   Preferred Pharmacy with phone number:[facility]  Local or Mail Order: Local   Ordering Provider:PADMINI HESTER   Would the patient rather a call back or a response via [facility]? Call back   Best Call Back Number:[phone]   Additional Information:

## 2022-12-27 NOTE — TELEPHONE ENCOUNTER
Patient requesting refill on percocet 5 mg   Last office visit 11/16/22   shows last refill on 11/28/22  Patient does have a pain contract on file with [facility]  Patient last UDS 8/16/22 was consistent with current therapy     CODEINE  Not Detected  Not Detected    MORPHINE  Not Detected  Not Detected    6-ACETYLMORPHINE  Not Detected  Not Detected    OXYCODONE  Present  Present    NOROYXCODONE  Present  Present    OXYMORPHONE  Present  Not Detected    NOROXYMORPHONE  Present  Not Detected    HYDROCODONE  Not Detected  Not Detected    NORHYDROCODONE  Not Detected  Not Detected    HYDROMORPHONE  Not Detected  Not Detected    BUPRENORPHINE  Not Detected  Not Detected    NORUBPRENORPHINE  Not Detected  Not Detected    FENTANYL  Not Detected  Not Detected    NORFENTANYL  Not Detected  Not Detected    MEPERIDINE METABOLITE  Not Detected  Not Detected    TAPENTADOL  Not Detected  Not Detected    TAPENTADOL-O-SULF  Not Detected  Not Detected    METHADONE  Not Detected  Not Detected    TRAMADOL  Not Detected  Not Detected    AMPHETAMINE  Not Detected  Not Detected    METHAMPHETAMINE  Not Detected  Not Detected    MDMA- ECSTASY  Not Detected  Not Detected    MDA  Not Detected  Not Detected    MDEA- Genoveva  Not Detected

## 2023-01-03 NOTE — PROGRESS NOTES
Mild gastritis, negative for H. Pylori or metaplasia which is reassuring.     Can repeat next surveillance EGD and EUS in October 2023 given h/o gastric cancer post ESD done with Dr. Carballo.    Meagan, can the next surveillance EGD/EUS be booked with Dr. Carballo as she is his post ESD patient? Or can be with myself since I have done her procedure once as well.

## 2023-01-04 ENCOUNTER — LAB VISIT (OUTPATIENT)
Dept: LAB | Facility: OTHER | Age: 73
End: 2023-01-04
Attending: STUDENT IN AN ORGANIZED HEALTH CARE EDUCATION/TRAINING PROGRAM
Payer: MEDICARE

## 2023-01-04 ENCOUNTER — INFUSION (OUTPATIENT)
Dept: INFUSION THERAPY | Facility: OTHER | Age: 73
End: 2023-01-04
Attending: STUDENT IN AN ORGANIZED HEALTH CARE EDUCATION/TRAINING PROGRAM
Payer: MEDICARE

## 2023-01-04 ENCOUNTER — OFFICE VISIT (OUTPATIENT)
Dept: HEMATOLOGY/ONCOLOGY | Facility: CLINIC | Age: 73
End: 2023-01-04
Payer: MEDICARE

## 2023-01-04 VITALS
DIASTOLIC BLOOD PRESSURE: 66 MMHG | BODY MASS INDEX: 43.9 KG/M2 | SYSTOLIC BLOOD PRESSURE: 132 MMHG | WEIGHT: 273.13 LBS | OXYGEN SATURATION: 99 % | TEMPERATURE: 98 F | HEART RATE: 71 BPM | HEIGHT: 66 IN | RESPIRATION RATE: 18 BRPM

## 2023-01-04 DIAGNOSIS — Z86.718 HISTORY OF DVT (DEEP VEIN THROMBOSIS): Chronic | ICD-10-CM

## 2023-01-04 DIAGNOSIS — C16.9 GASTRIC ADENOCARCINOMA: Primary | ICD-10-CM

## 2023-01-04 DIAGNOSIS — D53.9 NUTRITIONAL ANEMIA, UNSPECIFIED: ICD-10-CM

## 2023-01-04 DIAGNOSIS — I50.32 CHRONIC DIASTOLIC CONGESTIVE HEART FAILURE: Chronic | ICD-10-CM

## 2023-01-04 DIAGNOSIS — I10 ESSENTIAL HYPERTENSION: ICD-10-CM

## 2023-01-04 DIAGNOSIS — D64.9 MILD CHRONIC ANEMIA: ICD-10-CM

## 2023-01-04 DIAGNOSIS — C16.9 GASTRIC ADENOCARCINOMA: ICD-10-CM

## 2023-01-04 LAB
ALBUMIN SERPL BCP-MCNC: 3.4 G/DL (ref 3.5–5.2)
ALP SERPL-CCNC: 92 U/L (ref 55–135)
ALT SERPL W/O P-5'-P-CCNC: 17 U/L (ref 10–44)
ANION GAP SERPL CALC-SCNC: 6 MMOL/L (ref 8–16)
AST SERPL-CCNC: 16 U/L (ref 10–40)
BILIRUB SERPL-MCNC: 0.6 MG/DL (ref 0.1–1)
BUN SERPL-MCNC: 18 MG/DL (ref 8–23)
CALCIUM SERPL-MCNC: 9.6 MG/DL (ref 8.7–10.5)
CHLORIDE SERPL-SCNC: 108 MMOL/L (ref 95–110)
CO2 SERPL-SCNC: 26 MMOL/L (ref 23–29)
CREAT SERPL-MCNC: 0.7 MG/DL (ref 0.5–1.4)
ERYTHROCYTE [DISTWIDTH] IN BLOOD BY AUTOMATED COUNT: 12.4 % (ref 11.5–14.5)
EST. GFR  (NO RACE VARIABLE): >60 ML/MIN/1.73 M^2
GLUCOSE SERPL-MCNC: 91 MG/DL (ref 70–110)
HCT VFR BLD AUTO: 36.2 % (ref 37–48.5)
HGB BLD-MCNC: 11.5 G/DL (ref 12–16)
IMM GRANULOCYTES # BLD AUTO: 0.03 K/UL (ref 0–0.04)
MCH RBC QN AUTO: 33.1 PG (ref 27–31)
MCHC RBC AUTO-ENTMCNC: 31.8 G/DL (ref 32–36)
MCV RBC AUTO: 104 FL (ref 82–98)
NEUTROPHILS # BLD AUTO: 6.3 K/UL (ref 1.8–7.7)
PLATELET # BLD AUTO: 228 K/UL (ref 150–450)
PMV BLD AUTO: 10.2 FL (ref 9.2–12.9)
POTASSIUM SERPL-SCNC: 4.5 MMOL/L (ref 3.5–5.1)
PROT SERPL-MCNC: 6.7 G/DL (ref 6–8.4)
RBC # BLD AUTO: 3.47 M/UL (ref 4–5.4)
SODIUM SERPL-SCNC: 140 MMOL/L (ref 136–145)
WBC # BLD AUTO: 8.73 K/UL (ref 3.9–12.7)

## 2023-01-04 PROCEDURE — 85027 COMPLETE CBC AUTOMATED: CPT | Mod: HCNC | Performed by: STUDENT IN AN ORGANIZED HEALTH CARE EDUCATION/TRAINING PROGRAM

## 2023-01-04 PROCEDURE — 99214 OFFICE O/P EST MOD 30 MIN: CPT | Mod: HCNC,S$GLB,, | Performed by: STUDENT IN AN ORGANIZED HEALTH CARE EDUCATION/TRAINING PROGRAM

## 2023-01-04 PROCEDURE — 99999 PR PBB SHADOW E&M-EST. PATIENT-LVL IV: CPT | Mod: PBBFAC,HCNC,, | Performed by: STUDENT IN AN ORGANIZED HEALTH CARE EDUCATION/TRAINING PROGRAM

## 2023-01-04 PROCEDURE — 3075F SYST BP GE 130 - 139MM HG: CPT | Mod: HCNC,CPTII,S$GLB, | Performed by: STUDENT IN AN ORGANIZED HEALTH CARE EDUCATION/TRAINING PROGRAM

## 2023-01-04 PROCEDURE — 99499 RISK ADDL DX/OHS AUDIT: ICD-10-PCS | Mod: S$GLB,,, | Performed by: STUDENT IN AN ORGANIZED HEALTH CARE EDUCATION/TRAINING PROGRAM

## 2023-01-04 PROCEDURE — 1159F PR MEDICATION LIST DOCUMENTED IN MEDICAL RECORD: ICD-10-PCS | Mod: HCNC,CPTII,S$GLB, | Performed by: STUDENT IN AN ORGANIZED HEALTH CARE EDUCATION/TRAINING PROGRAM

## 2023-01-04 PROCEDURE — 80053 COMPREHEN METABOLIC PANEL: CPT | Mod: HCNC | Performed by: STUDENT IN AN ORGANIZED HEALTH CARE EDUCATION/TRAINING PROGRAM

## 2023-01-04 PROCEDURE — 1101F PT FALLS ASSESS-DOCD LE1/YR: CPT | Mod: HCNC,CPTII,S$GLB, | Performed by: STUDENT IN AN ORGANIZED HEALTH CARE EDUCATION/TRAINING PROGRAM

## 2023-01-04 PROCEDURE — 63600175 PHARM REV CODE 636 W HCPCS: Mod: HCNC | Performed by: STUDENT IN AN ORGANIZED HEALTH CARE EDUCATION/TRAINING PROGRAM

## 2023-01-04 PROCEDURE — 36415 COLL VENOUS BLD VENIPUNCTURE: CPT | Mod: HCNC | Performed by: STUDENT IN AN ORGANIZED HEALTH CARE EDUCATION/TRAINING PROGRAM

## 2023-01-04 PROCEDURE — 96523 IRRIG DRUG DELIVERY DEVICE: CPT | Mod: HCNC

## 2023-01-04 PROCEDURE — 3075F PR MOST RECENT SYSTOLIC BLOOD PRESS GE 130-139MM HG: ICD-10-PCS | Mod: HCNC,CPTII,S$GLB, | Performed by: STUDENT IN AN ORGANIZED HEALTH CARE EDUCATION/TRAINING PROGRAM

## 2023-01-04 PROCEDURE — 1160F PR REVIEW ALL MEDS BY PRESCRIBER/CLIN PHARMACIST DOCUMENTED: ICD-10-PCS | Mod: HCNC,CPTII,S$GLB, | Performed by: STUDENT IN AN ORGANIZED HEALTH CARE EDUCATION/TRAINING PROGRAM

## 2023-01-04 PROCEDURE — 1159F MED LIST DOCD IN RCRD: CPT | Mod: HCNC,CPTII,S$GLB, | Performed by: STUDENT IN AN ORGANIZED HEALTH CARE EDUCATION/TRAINING PROGRAM

## 2023-01-04 PROCEDURE — 1126F PR PAIN SEVERITY QUANTIFIED, NO PAIN PRESENT: ICD-10-PCS | Mod: HCNC,CPTII,S$GLB, | Performed by: STUDENT IN AN ORGANIZED HEALTH CARE EDUCATION/TRAINING PROGRAM

## 2023-01-04 PROCEDURE — 3008F PR BODY MASS INDEX (BMI) DOCUMENTED: ICD-10-PCS | Mod: HCNC,CPTII,S$GLB, | Performed by: STUDENT IN AN ORGANIZED HEALTH CARE EDUCATION/TRAINING PROGRAM

## 2023-01-04 PROCEDURE — 99214 PR OFFICE/OUTPT VISIT, EST, LEVL IV, 30-39 MIN: ICD-10-PCS | Mod: HCNC,S$GLB,, | Performed by: STUDENT IN AN ORGANIZED HEALTH CARE EDUCATION/TRAINING PROGRAM

## 2023-01-04 PROCEDURE — 1101F PR PT FALLS ASSESS DOC 0-1 FALLS W/OUT INJ PAST YR: ICD-10-PCS | Mod: HCNC,CPTII,S$GLB, | Performed by: STUDENT IN AN ORGANIZED HEALTH CARE EDUCATION/TRAINING PROGRAM

## 2023-01-04 PROCEDURE — 25000003 PHARM REV CODE 250: Mod: HCNC | Performed by: STUDENT IN AN ORGANIZED HEALTH CARE EDUCATION/TRAINING PROGRAM

## 2023-01-04 PROCEDURE — A4216 STERILE WATER/SALINE, 10 ML: HCPCS | Mod: HCNC | Performed by: STUDENT IN AN ORGANIZED HEALTH CARE EDUCATION/TRAINING PROGRAM

## 2023-01-04 PROCEDURE — 99999 PR PBB SHADOW E&M-EST. PATIENT-LVL IV: ICD-10-PCS | Mod: PBBFAC,HCNC,, | Performed by: STUDENT IN AN ORGANIZED HEALTH CARE EDUCATION/TRAINING PROGRAM

## 2023-01-04 PROCEDURE — 3078F DIAST BP <80 MM HG: CPT | Mod: HCNC,CPTII,S$GLB, | Performed by: STUDENT IN AN ORGANIZED HEALTH CARE EDUCATION/TRAINING PROGRAM

## 2023-01-04 PROCEDURE — 3288F FALL RISK ASSESSMENT DOCD: CPT | Mod: HCNC,CPTII,S$GLB, | Performed by: STUDENT IN AN ORGANIZED HEALTH CARE EDUCATION/TRAINING PROGRAM

## 2023-01-04 PROCEDURE — 3008F BODY MASS INDEX DOCD: CPT | Mod: HCNC,CPTII,S$GLB, | Performed by: STUDENT IN AN ORGANIZED HEALTH CARE EDUCATION/TRAINING PROGRAM

## 2023-01-04 PROCEDURE — 1126F AMNT PAIN NOTED NONE PRSNT: CPT | Mod: HCNC,CPTII,S$GLB, | Performed by: STUDENT IN AN ORGANIZED HEALTH CARE EDUCATION/TRAINING PROGRAM

## 2023-01-04 PROCEDURE — 99499 UNLISTED E&M SERVICE: CPT | Mod: S$GLB,,, | Performed by: STUDENT IN AN ORGANIZED HEALTH CARE EDUCATION/TRAINING PROGRAM

## 2023-01-04 PROCEDURE — 3078F PR MOST RECENT DIASTOLIC BLOOD PRESSURE < 80 MM HG: ICD-10-PCS | Mod: HCNC,CPTII,S$GLB, | Performed by: STUDENT IN AN ORGANIZED HEALTH CARE EDUCATION/TRAINING PROGRAM

## 2023-01-04 PROCEDURE — 3288F PR FALLS RISK ASSESSMENT DOCUMENTED: ICD-10-PCS | Mod: HCNC,CPTII,S$GLB, | Performed by: STUDENT IN AN ORGANIZED HEALTH CARE EDUCATION/TRAINING PROGRAM

## 2023-01-04 PROCEDURE — 1160F RVW MEDS BY RX/DR IN RCRD: CPT | Mod: HCNC,CPTII,S$GLB, | Performed by: STUDENT IN AN ORGANIZED HEALTH CARE EDUCATION/TRAINING PROGRAM

## 2023-01-04 RX ORDER — HEPARIN 100 UNIT/ML
500 SYRINGE INTRAVENOUS
Status: DISCONTINUED | OUTPATIENT
Start: 2023-01-04 | End: 2023-01-04 | Stop reason: HOSPADM

## 2023-01-04 RX ORDER — SODIUM CHLORIDE 0.9 % (FLUSH) 0.9 %
10 SYRINGE (ML) INJECTION
Status: DISCONTINUED | OUTPATIENT
Start: 2023-01-04 | End: 2023-01-04 | Stop reason: HOSPADM

## 2023-01-04 RX ADMIN — Medication 10 ML: at 11:01

## 2023-01-04 RX ADMIN — HEPARIN 500 UNITS: 100 SYRINGE at 11:01

## 2023-01-04 NOTE — PROGRESS NOTES
Patient, Ca Coats (MRN #[id number]), presented with a recorded BMI of 44.09 kg/m^2 consistent with the definition of morbid obesity (ICD-10 E66.01). The patient's morbid obesity was monitored, evaluated, addressed and/or treated. This addendum to the medical record is made on 01/04/2023.

## 2023-01-04 NOTE — PROGRESS NOTES
Hematology- Oncology Clinic Note :      1/4/2023    RFV / chief complaint- Gastric Cancer and Follow-up          HPI  Pt is a 72 y.o. 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   Pt presents to the clinic today for gastric cancer    Doing good.   Joint pain is stable. Physical activity increasing slowly.     Advised to quit smoking    Oncology presentation/ HPI  Pt was being evaluated for gastric bypass surgery   Feb 2021 EGD- Gastric tumor in the prepyloric region of the stomach,  March 2021 EUS- Gastric tumor in the prepyloric region of the stomach.  Both above biopsies did not show malignancy  5/25/21 EGD- Gastric tumor on the posterior wall of the gastric antrum. Complete removal was accomplished.         Reviewed past medical/surgical/social history    Past Medical History:   Diagnosis Date    YOUNG (acute kidney injury) 10/15/2021    Anemia     2018    Arthritis     BMI 60.0-69.9, adult     Cataract     Chronic diastolic congestive heart failure 1/27/2021    Depression     Dry eye syndrome     Gastric adenocarcinoma 5/25/2021    GERD (gastroesophageal reflux disease)     Glaucoma suspect     History of gastric ulcer     History of psychiatric hospitalization     Hx of psychiatric care     Celexa, no recall of charted Effexor, Lexapro, Desyrel prescriptions    Hyperlipidemia     Hypertension     Hypothyroidism     Morbid obesity     Neuromuscular disorder     Sleep apnea     Thyroid disease       Past Surgical History:   Procedure Laterality Date     APPENDECTOMY      CARDIAC SURGERY      CATARACT EXTRACTION W/  INTRAOCULAR LENS IMPLANT Bilateral     MFIOL OU    CORONARY ANGIOGRAPHY Bilateral 2/24/2021    Procedure: ANGIOGRAM, CORONARY ARTERY;  Surgeon: Cresencio Ridley MD;  Location: [facility] CATH LAB;  Service: Cardiology;  Laterality: Bilateral;  Covid test needed - ok per Danay SSCU. Pt. w/o transportation or family    ENDOSCOPIC ULTRASOUND OF UPPER GASTROINTESTINAL TRACT N/A 3/17/2021    Procedure: ULTRASOUND, UPPER GI TRACT, ENDOSCOPIC;  Surgeon: Gerald Anaya MD;  Location: [facility] SAM (2ND FLR);  Service: Endoscopy;  Laterality: N/A;  Pt unable to get a ride for swab. Will do rapid test at 8:30 - ttr    ENDOSCOPIC ULTRASOUND OF UPPER GASTROINTESTINAL TRACT N/A 1/13/2022    Procedure: ULTRASOUND, UPPER GI TRACT, ENDOSCOPIC;  Surgeon: Kevin Carballo MD;  Location: [facility] (2ND FLR);  Service: Endoscopy;  Laterality: N/A;  blood thinner approval received, see telephone encounter 1/7/22-BB  rapid  instructions given verbally and sent to address on file in pt's chart-BB    ENDOSCOPIC ULTRASOUND OF UPPER GASTROINTESTINAL TRACT N/A 10/11/2022    Procedure: ULTRASOUND, UPPER GI TRACT, ENDOSCOPIC;  Surgeon: Dequan Ridley MD;  Location: [facility] (2ND FLR);  Service: Endoscopy;  Laterality: N/A;    ESOPHAGOGASTRODUODENOSCOPY N/A 2/5/2021    Procedure: EGD (ESOPHAGOGASTRODUODENOSCOPY);  Surgeon: Matthew Hernadez MD;  Location: [facility] (2ND FLR);  Service: Endoscopy;  Laterality: N/A;  BMI-58    Wt: 361#     COVID test at [facility] on 2/2-GT    ESOPHAGOGASTRODUODENOSCOPY N/A 3/17/2021    Procedure: EGD (ESOPHAGOGASTRODUODENOSCOPY);  Surgeon: Gerald Anaya MD;  Location: [facility] ENDO (2ND FLR);  Service: Endoscopy;  Laterality: N/A;    ESOPHAGOGASTRODUODENOSCOPY N/A 5/25/2021    Procedure: EGD (ESOPHAGOGASTRODUODENOSCOPY);  Surgeon: Kevin Carballo MD;  Location: [facility] SAM (2ND FLR);  Service: Endoscopy;  Laterality: N/A;  ESD 2 hours  Full COVID vaccination  on file. ttr    ESOPHAGOGASTRODUODENOSCOPY N/A 1/13/2022    Procedure: EGD (ESOPHAGOGASTRODUODENOSCOPY);  Surgeon: Kevin Carballo MD;  Location: [facility] ENDO (2ND FLR);  Service: Endoscopy;  Laterality: N/A;  blood thinner approval, see telephone encounter 1/7/22-BB  rapid  instructions given verbally and sent to address on file in pt's chart-BB    ESOPHAGOGASTRODUODENOSCOPY N/A 10/11/2022    Procedure: EGD (ESOPHAGOGASTRODUODENOSCOPY);  Surgeon: Dequan Ridley MD;  Location: [facility] ENDO (2ND FLR);  Service: Endoscopy;  Laterality: N/A;  She is do for EGD/EUS now. Personal history of gastric cancer. Ca Coats #[id number].   Thanks,   Kevin Fuentes MD    Pt is fully vaccinated-DS  9/14/22-Approval to hold Eliquis rec'd from Dr. Pelaez (see telephone encounter 9/14/22)-DS  9/14/22-Ins    EYE SURGERY      INJECTION OF ANESTHETIC AGENT AROUND NERVE Left 7/10/2018    Procedure: BLOCK, NERVE;  Surgeon: Samantha Vidal MD;  Location: [facility] PAIN MGT;  Service: Pain Management;  Laterality: Left;  Genicular     INJECTION OF ANESTHETIC AGENT AROUND NERVE Left 7/19/2019    Procedure: Block, Nerve NERVE BLOCK GENICULAR WITH PHENOL 6%;  Surgeon: Samantha Vidal MD;  Location: [facility] PAIN MGT;  Service: Pain Management;  Laterality: Left;  NEEDS CONSENT    INSERTION OF TUNNELED CENTRAL VENOUS CATHETER (CVC) WITH SUBCUTANEOUS PORT N/A 7/9/2021    Procedure: INSERTION, PORT-A-CATH;  Surgeon: Mario Paz MD;  Location: [facility] CATH LAB;  Service: Radiology;  Laterality: N/A;  PORT PLACEMENT    RADIOFREQUENCY ABLATION Left 6/19/2020    Procedure: RADIOFREQUENCY ABLATION LEFT GENICULAR;  Surgeon: Tevin Clark MD;  Location: [facility] PAIN MGT;  Service: Pain Management;  Laterality: Left;  Left Genicular RFA    RADIOFREQUENCY ABLATION Left 1/22/2021    Procedure: RADIOFREQUENCY ABLATION LEFT GENICULAR;  Surgeon: Tevin Clark MD;  Location: [facility] PAIN MGT;  Service: Pain Management;  Laterality: Left;    RADIOFREQUENCY ABLATION  Left 7/30/2021    Procedure: RADIOFREQUENCY ABLATION, GENICULAR COOLED NEED CONSENT;  Surgeon: Tevin Clark MD;  Location: [facility] PAIN MGT;  Service: Pain Management;  Laterality: Left;    RADIOFREQUENCY ABLATION Left 4/22/2022    Procedure: RADIOFREQUENCY ABLATION, GENICULAR COOLED , LEFT;  Surgeon: Tevin Clark MD;  Location: [facility] PAIN MGT;  Service: Pain Management;  Laterality: Left;    RADIOFREQUENCY ABLATION Left 12/23/2022    Procedure: RADIOFREQUENCY ABLATION LEFT GENICULAR COOLED CLEARED TO HOLD ELIQUIS 3 DAYS  NEEDS CONSENT;  Surgeon: Tevin Clark MD;  Location: [facility] PAIN MGT;  Service: Pain Management;  Laterality: Left;    TONSILLECTOMY        (Not in a hospital admission)      Review of patient's allergies indicates:  No Known Allergies   Social History     Tobacco Use    Smoking status: Some Days     Years: 50.00     Types: Cigarettes    Smokeless tobacco: Never    Tobacco comments:     currently smoking <5 cigarettes most days   Substance Use Topics    Alcohol use: Yes     Comment: raely      Family History   Problem Relation Age of Onset    No Known Problems Mother     No Known Problems Father     Colon cancer Neg Hx     Esophageal cancer Neg Hx           Review of Systems :  Review of Systems   Constitutional:  Positive for malaise/fatigue. Negative for chills, diaphoresis, fever and weight loss.   HENT: Negative.  Negative for congestion, hearing loss, nosebleeds, sore throat and tinnitus.    Eyes:  Negative for blurred vision, discharge and redness.   Respiratory:  Negative for cough, hemoptysis, sputum production, shortness of breath and wheezing.    Cardiovascular:  Negative for chest pain, palpitations and leg swelling.   Gastrointestinal:  Negative for abdominal pain, blood in stool, constipation, diarrhea, heartburn, melena, nausea and vomiting.   Genitourinary: Negative.    Musculoskeletal:  Positive for joint pain. Negative for back pain, falls and myalgias.   Skin:  Negative for  "itching and rash.   Neurological:  Negative for dizziness, tingling, sensory change, speech change, focal weakness, seizures, loss of consciousness, weakness and headaches.   Endo/Heme/Allergies: Negative.  Does not bruise/bleed easily.   Psychiatric/Behavioral: Negative.  Negative for depression. The patient is not nervous/anxious and does not have insomnia.              Physical Exam :  /66 (BP Location: Right arm, Patient Position: Sitting, BP Method: Medium (Automatic)) Comment (BP Location): Right Lower Arm  Pulse 71   Temp 98.3 °F (36.8 °C) (Oral)   Resp 18   Ht 5' 6" (1.676 m)   Wt 123.9 kg (273 lb 2.4 oz)   SpO2 99%   BMI 44.09 kg/m²   Wt Readings from Last 3 Encounters:   01/04/23 123.9 kg (273 lb 2.4 oz)   12/23/22 120.7 kg (266 lb)   11/28/22 122.6 kg (270 lb 4.5 oz)       Body mass index is 44.09 kg/m².      Physical Exam  Vitals and nursing note reviewed.   Constitutional:       General: She is not in acute distress.     Appearance: She is well-developed. She is not ill-appearing.   HENT:      Head: Normocephalic and atraumatic.      Right Ear: External ear normal.      Left Ear: External ear normal.      Mouth/Throat:      Pharynx: No oropharyngeal exudate.   Eyes:      General: No scleral icterus.     Conjunctiva/sclera:      Right eye: No hemorrhage.     Left eye: No hemorrhage.  Neck:      Thyroid: No thyromegaly.      Trachea: No tracheal deviation.   Cardiovascular:      Rate and Rhythm: Normal rate and regular rhythm.      Heart sounds: Normal heart sounds. No murmur heard.  Pulmonary:      Effort: Pulmonary effort is normal. No respiratory distress.      Breath sounds: Normal breath sounds. No wheezing or rales.      Comments: Port SOI  Abdominal:      General: Bowel sounds are normal. There is no distension (obese).      Palpations: Abdomen is soft. There is no mass.      Tenderness: There is no abdominal tenderness. There is no rebound.      Comments: Skin examined, no infection " in pannus area.    Musculoskeletal:         General: No swelling or tenderness. Normal range of motion.      Cervical back: Normal range of motion and neck supple.   Lymphadenopathy:      Cervical: No cervical adenopathy.   Skin:     General: Skin is warm.      Findings: No erythema, rash or wound.   Neurological:      Mental Status: She is alert and oriented to person, place, and time.      Cranial Nerves: No cranial nerve deficit.      Gait: Gait abnormal (uses cane).   Psychiatric:         Behavior: Behavior normal.           Current Outpatient Medications   Medication Sig Dispense Refill    acetaminophen (TYLENOL) 500 MG tablet       amLODIPine (NORVASC) 10 MG tablet TAKE 1 TABLET ONE TIME DAILY 90 tablet 11    atorvastatin (LIPITOR) 20 MG tablet TAKE 1 TABLET EVERY DAY 90 tablet 11    B-complex with vitamin C (Z-BEC OR EQUIV) tablet Take 1 tablet by mouth once daily.       CALCIUM CITRATE-VITAMIN D2 ORAL Take 1 tablet by mouth once daily.       citalopram (CELEXA) 10 MG tablet TAKE 1 TABLET EVERY DAY 90 tablet 1    dextran 70-hypromellose (TEARS) ophthalmic solution Place 1 drop into the right eye every 6 (six) hours. 30 mL 0    ELIQUIS 5 mg Tab TAKE 1 TABLET TWICE DAILY 180 tablet 2    ferrous gluconate (FERGON) 324 MG tablet TAKE 1 TABLET (324 MG TOTAL) BY MOUTH DAILY WITH BREAKFAST. 90 tablet 11    furosemide (LASIX) 80 MG tablet Take 1 tablet (80 mg total) by mouth 2 (two) times a day. 60 tablet 11    gabapentin (NEURONTIN) 300 MG capsule Take 2 capsules (600 mg total) by mouth 3 (three) times daily. 540 capsule 2    ibuprofen (ADVIL,MOTRIN) 600 MG tablet Take 1 tablet (600 mg total) by mouth every 8 (eight) hours as needed for Pain. 10 tablet 0    lactulose (CHRONULAC) 10 gram/15 mL solution Take 30 mLs (20 g total) by mouth 2 (two) times daily as needed (constipation). 120 mL 1    LIDOcaine-prilocaine (EMLA) cream Apply topically as needed (prior to port access). 30 g 0    lisinopriL (PRINIVIL,ZESTRIL) 40  MG tablet TAKE 1 TABLET ONE TIME DAILY 90 tablet 11    multivitamin with minerals tablet Take 1 tablet by mouth once daily.       oxyCODONE-acetaminophen (PERCOCET) 5-325 mg per tablet Take 1 tablet by mouth every 8 (eight) hours as needed for Pain. 90 tablet 0    OZEMPIC 1 mg/dose (4 mg/3 mL) INJECT 1 MG INTO THE SKIN EVERY 7 DAYS. 9 pen 11    pantoprazole (PROTONIX) 40 MG tablet Take 1 tablet (40 mg total) by mouth Daily. 90 tablet 0     No current facility-administered medications for this visit.     Facility-Administered Medications Ordered in Other Visits   Medication Dose Route Frequency Provider Last Rate Last Admin    0.9%  NaCl infusion   Intravenous Continuous Tevin Clark MD   Stopped at 01/13/22 1055    heparin, porcine (PF) 100 unit/mL injection flush 500 Units  500 Units Intravenous PRN Cathy Pelaez MD        sodium chloride 0.9% flush 10 mL  10 mL Intravenous PRN Cathy Pelaez MD           Pertinent Diagnostic studies:      Lab Visit on 01/04/2023   Component Date Value Ref Range Status    WBC 01/04/2023 8.73  3.90 - 12.70 K/uL Final    RBC 01/04/2023 3.47 (L)  4.00 - 5.40 M/uL Final    Hemoglobin 01/04/2023 11.5 (L)  12.0 - 16.0 g/dL Final    Hematocrit 01/04/2023 36.2 (L)  37.0 - 48.5 % Final    MCV 01/04/2023 104 (H)  82 - 98 fL Final    MCH 01/04/2023 33.1 (H)  27.0 - 31.0 pg Final    MCHC 01/04/2023 31.8 (L)  32.0 - 36.0 g/dL Final    RDW 01/04/2023 12.4  11.5 - 14.5 % Final    Platelets 01/04/2023 228  150 - 450 K/uL Final    MPV 01/04/2023 10.2  9.2 - 12.9 fL Final    Gran # (ANC) 01/04/2023 6.3  1.8 - 7.7 K/uL Final    Comment: The ANC is based on a white cell differential from an   automated cell counter. It has not been microscopically   reviewed for the presence of abnormal cells. Clinical   correlation is required.      Immature Grans (Abs) 01/04/2023 0.03  0.00 - 0.04 K/uL Final    Comment: Mild elevation in immature granulocytes is non specific and   can be seen in a variety of  conditions including stress response,   acute inflammation, trauma and pregnancy. Correlation with other   laboratory and clinical findings is essential.      Sodium 01/04/2023 140  136 - 145 mmol/L Final    Potassium 01/04/2023 4.5  3.5 - 5.1 mmol/L Final    Chloride 01/04/2023 108  95 - 110 mmol/L Final    CO2 01/04/2023 26  23 - 29 mmol/L Final    Glucose 01/04/2023 91  70 - 110 mg/dL Final    BUN 01/04/2023 18  8 - 23 mg/dL Final    Creatinine 01/04/2023 0.7  0.5 - 1.4 mg/dL Final    Calcium 01/04/2023 9.6  8.7 - 10.5 mg/dL Final    Total Protein 01/04/2023 6.7  6.0 - 8.4 g/dL Final    Albumin 01/04/2023 3.4 (L)  3.5 - 5.2 g/dL Final    Total Bilirubin 01/04/2023 0.6  0.1 - 1.0 mg/dL Final    Comment: For infants and newborns, interpretation of results should be based  on gestational age, weight and in agreement with clinical  observations.    Premature Infant recommended reference ranges:  Up to 24 hours.............<8.0 mg/dL  Up to 48 hours............<12.0 mg/dL  3-5 days..................<15.0 mg/dL  6-29 days.................<15.0 mg/dL      Alkaline Phosphatase 01/04/2023 92  55 - 135 U/L Final    AST 01/04/2023 16  10 - 40 U/L Final    ALT 01/04/2023 17  10 - 44 U/L Final    Anion Gap 01/04/2023 6 (L)  8 - 16 mmol/L Final    eGFR 01/04/2023 >60  >60 mL/min/1.73 m^2 Final         Patient Active Problem List    Diagnosis Date Noted    Gastric adenocarcinoma 05/25/2021    Chronic diastolic congestive heart failure 01/27/2021    Class 3 severe obesity due to excess calories with serious comorbidity and body mass index (BMI) of 50.0 to 59.9 in adult 04/29/2019    Essential hypertension 04/29/2019    Left knee DJD 12/21/2018    Current mild episode of major depressive disorder without prior episode 03/07/2022    History of DVT (deep vein thrombosis) 10/15/2021    Chronic pain 07/30/2021    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    Abnormal cardiovascular stress test 02/24/2021     Tobacco abuse 01/28/2021    Pure hypercholesterolemia 01/27/2021    Gastroesophageal reflux disease 03/22/2020    Osteopenia of neck of left femur 01/14/2020    Left knee pain 07/19/2019    Hypothyroidism 07/18/2019    Debility     At high risk for injury related to fall     Gait instability 07/15/2019    Major depressive disorder 04/29/2019    Joint pain 04/29/2019    Chronic knee pain 11/30/2018    Obstructive sleep apnea 08/09/2018    Primary osteoarthritis of left knee 07/10/2018     Active Problem List with Overview Notes    Diagnosis Date Noted    Gastric adenocarcinoma 05/25/2021     gastric cancer cT2 or higher      Chronic diastolic congestive heart failure 01/27/2021    Class 3 severe obesity due to excess calories with serious comorbidity and body mass index (BMI) of 50.0 to 59.9 in adult 04/29/2019    Essential hypertension 04/29/2019    Left knee DJD 12/21/2018    Current mild episode of major depressive disorder without prior episode 03/07/2022    History of DVT (deep vein thrombosis) 10/15/2021    Chronic pain 07/30/2021    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    Abnormal cardiovascular stress test 02/24/2021    Tobacco abuse 01/28/2021    Pure hypercholesterolemia 01/27/2021    Gastroesophageal reflux disease 03/22/2020    Osteopenia of neck of left femur 01/14/2020    Left knee pain 07/19/2019    Hypothyroidism 07/18/2019    Debility     At high risk for injury related to fall     Gait instability 07/15/2019    Major depressive disorder 04/29/2019    Joint pain 04/29/2019    Chronic knee pain 11/30/2018    Obstructive sleep apnea 08/09/2018    Primary osteoarthritis of left knee 07/10/2018         Oncology History   Gastric adenocarcinoma   2/5/2021 Procedure    EGD  Feb 2021 EGD- Gastric tumor in the prepyloric region of the stomach,     3/17/2021 Procedure    EUS  Impression:           - Gastric tumor in the prepyloric region of the                         stomach.  Biopsied. Lesion appears amenable to                         endoscopic resection (ESD) - Dr. Carballo was present                         to view the lesion.      5/25/2021 Initial Diagnosis    Gastric adenocarcinoma     5/25/2021 Pathology Significant Finding    Adenocarcinoma, moderate to poorly differentiated   Tumor invades deep layers of submuocsa with deep margin extensively positive   Deep margin is extensively positive   Lateral margins are negative for neoplasia or malignancy      5/25/2021 Cancer Staged    Staging form: Stomach, AJCC 8th Edition  - Pathologic stage from 5/25/2021: Stage Unknown (pT1, pNX, cM0)       6/18/2021 Imaging Significant Findings    CT CAP   Asymmetric gastric wall thickening at the level of the gastric antrum presumably representing the patient's gastric adenocarcinoma.  I see no CT evidence for metastatic disease.     Low-density focus lower pole left kidney does not meet criteria for a simple cyst.  Findings can be further evaluated with ultrasound.  Additional subcentimeter low-density foci in the right kidney likely too small to characterize by imaging.     6/28/2021 Tumor Conference       [facility] UGI MULTIDISCIPLINARY TUMOR BOARD  PATIENT REVIEW FORM   ____________________________________________________________    CLINIC #: [id number]  DATE: 6/28/2021    DIAGNOSIS: gastric CA    PRESENTER: Latanya/ Donnie Sanders    PATIENT SUMMARY:   This 69 y/o female had incidental finding of gastric CA on EGD as part of bariatric evaluation given BMI 57. Underwent ESD per Dr. Carballo. Reviewed pathology - pT1b with LVI positive, extensive tumor at deep margin.     BOARD RECOMMENDATIONS:   Recommend perioperative chemotherapy, 4 cycles of FLOT    CONSULT NEEDED:     [] Surgery    [x] Hem/Onc    [] Rad/Onc    [] Dietary                 [] Social Service    [] Psychology       [] AES  [] Radiology     ESD Pathology Stage: Tumor 1b  Node(s) 0 Metastasis 0      GROUP STAGE:  [] O     [] 1A    [] IB    [] IIA    [] IIB     [] IIIA     [] IIIB     [] IIIC    []IV  [] Local recurrence     [] Regional recurrence     [] Distant recurrence   Metastatic site(s): none         [x] Jennifer'l Treatment Guidelines reviewed and care planned is consistent with guidelines.         (i.e., NCCN, NCI, PD, ACO, AUA, etc.)    PRESENTATION AT CANCER CONFERENCE:         [x] Prospective    [] Retrospective     [] Follow-Up       7/21/2021 - 9/30/2021 Chemotherapy    Treatment Summary   Plan Name: OP GASTRIC FLOT - FLUOROURACIL LEUCOVORIN OXALIPLATIN DOCETAXEL Q2W  Treatment Goal: Curative  Status: Inactive  Start Date: 7/21/2021  End Date: 9/30/2021  Provider: Cathy Pelaez MD  Chemotherapy: DOCEtaxeL (TAXOTERE) 50 mg/m2 = 135 mg in sodium chloride 0.9% 250 mL chemo infusion, 50 mg/m2 = 135 mg, Intravenous, Clinic/HOD 1 time, 4 of 4 cycles  Dose modification: 40 mg/m2 (original dose 50 mg/m2, Cycle 3)  Administration: 135 mg (7/21/2021), 135 mg (8/12/2021), 105 mg (9/15/2021), 105 mg (9/29/2021)  leucovorin calcium 200 mg/m2 = 545 mg in dextrose 5 % 250 mL infusion, 200 mg/m2 = 545 mg, Intravenous, Clinic/HOD 1 time, 4 of 4 cycles  Administration: 545 mg (7/21/2021), 535 mg (8/12/2021), 530 mg (9/15/2021), 525 mg (9/29/2021)  oxaliplatin (ELOXATIN) 85 mg/m2 = 232 mg in dextrose 5 % 500 mL chemo infusion, 85 mg/m2 = 232 mg, Intravenous, Clinic/HOD 1 time, 4 of 4 cycles  Administration: 232 mg (7/21/2021), 228 mg (8/12/2021), 225 mg (9/15/2021), 223 mg (9/29/2021)  fluorouraciL 7,000 mg in sodium chloride 0.9% 240 mL chemo infusion, 7,100 mg, Intravenous, Over 24 hours, 4 of 11 cycles  Dose modification: 2,000 mg/m2 (original dose 2,600 mg/m2, Cycle 3), 225 mg/m2/day (original dose 2,600 mg/m2, Cycle 5)  Administration: 7,000 mg (7/21/2021), 6,970 mg (8/12/2021), 5,300 mg (9/15/2021), 5,000 mg (9/29/2021)       9/16/2021 Imaging Significant Findings    Thrombosis of the right popliteal, posterior tibial, and peroneal  veins.     11/15/2021 Imaging Significant Findings    CT CAP Overall, no detrimental change compared to previous.  Persistent eccentric wall thickening at the level of the antrum in this patient with provided history of gastric adenocarcinoma.  No metastatic disease.     Subcentimeter pulmonary nodules unchanged.  No new nodules.     Cholelithiasis     1/13/2022 Procedure    EUS  Impression:            - Normal esophagus.                          - Scar in the gastric antrum. Biopsied.                          - Congested, nodular and ulcerated mucosa in the                          antrum. Biopsied. Treated with argon plasma                          coagulation (APC).                          - Acquired deformity in the prepyloric region of                          the stomach.                          - Normal examined duodenum.                          - There was abnormal echogenicity in the                          visualized portion of the liver. This was                          hyperechoic and homogenous. This can be seen with                          fatty liver.                          - Hyperechoic material consistent with sludge was                          visualized endosonographically in the gallbladder.                          - There was dilation in the common bile duct which                          measured up to 10.8 mm.                          - Wall thickening was seen in the antrum of the                          stomach. This probable related to previous ESD.      2/14/2022 -  Chemotherapy    Treatment Summary   Plan Name: OP FLUOROURACIL (5 DAY) QW + XRT  Treatment Goal: Curative  Status: Active  Start Date: 2/14/2022  End Date: 3/30/2022  Provider: Cathy Pelaez MD  Chemotherapy: [No matching medication found in this treatment plan]       2/14/2022 - 3/29/2022 Radiation Therapy    Treating physician: Dr. Sonu Darden  Total Dose: 50.4 Gy  Fractions: 28    Course: C1 Abdomen  2022    Treatment Site Ref. ID Energy Dose/Fx (Gy) #Fx Dose Correction (Gy) Total Dose (Gy) Start Date End Date Elapsed Days   IM Stomach PTV_Low 6X 1.8 25 / 25 0 45 2/14/2022 3/24/2022 38   IM StomachBst PTVhigh 6X 1.8 3 / 3 0 5.4 3/25/2022 3/29/2022 4   F Isabel Coats is a 71 y.o. woman with at least [id number] adenocarcinoma of gastric antrum (posterior wall), intestinal type, moderate to poorly differentiated, invading deep layers of submuocsa with deep SM extensively positive, lateral margins negative, LVI focal positive, PNI present, no nodes examined, s/p 4 cycles of FLOT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

## 2023-01-12 ENCOUNTER — TELEPHONE (OUTPATIENT)
Dept: PAIN MEDICINE | Facility: CLINIC | Age: 73
End: 2023-01-12
Payer: MEDICARE

## 2023-01-12 NOTE — TELEPHONE ENCOUNTER
Staff spoke with pt  regards to appt on 1/13/23 @ 10:20 am with godwin thorne Np on the 9th floor [id number].

## 2023-01-13 ENCOUNTER — OFFICE VISIT (OUTPATIENT)
Dept: PAIN MEDICINE | Facility: CLINIC | Age: 73
End: 2023-01-13
Payer: MEDICARE

## 2023-01-13 VITALS
DIASTOLIC BLOOD PRESSURE: 72 MMHG | HEIGHT: 66 IN | WEIGHT: 272.5 LBS | HEART RATE: 77 BPM | TEMPERATURE: 98 F | RESPIRATION RATE: 19 BRPM | SYSTOLIC BLOOD PRESSURE: 115 MMHG | BODY MASS INDEX: 43.79 KG/M2

## 2023-01-13 DIAGNOSIS — G89.29 CHRONIC PAIN OF LEFT KNEE: Primary | ICD-10-CM

## 2023-01-13 DIAGNOSIS — C16.9 GASTRIC ADENOCARCINOMA: ICD-10-CM

## 2023-01-13 DIAGNOSIS — Z79.891 ENCOUNTER FOR MONITORING OPIOID MAINTENANCE THERAPY: ICD-10-CM

## 2023-01-13 DIAGNOSIS — M25.562 CHRONIC PAIN OF LEFT KNEE: Primary | ICD-10-CM

## 2023-01-13 DIAGNOSIS — Z51.81 ENCOUNTER FOR MONITORING OPIOID MAINTENANCE THERAPY: ICD-10-CM

## 2023-01-13 DIAGNOSIS — G89.4 CHRONIC PAIN DISORDER: ICD-10-CM

## 2023-01-13 DIAGNOSIS — M17.12 PRIMARY OSTEOARTHRITIS OF LEFT KNEE: ICD-10-CM

## 2023-01-13 PROCEDURE — 1160F RVW MEDS BY RX/DR IN RCRD: CPT | Mod: HCNC,CPTII,S$GLB, | Performed by: NURSE PRACTITIONER

## 2023-01-13 PROCEDURE — 3288F PR FALLS RISK ASSESSMENT DOCUMENTED: ICD-10-PCS | Mod: HCNC,CPTII,S$GLB, | Performed by: NURSE PRACTITIONER

## 2023-01-13 PROCEDURE — 1126F PR PAIN SEVERITY QUANTIFIED, NO PAIN PRESENT: ICD-10-PCS | Mod: HCNC,CPTII,S$GLB, | Performed by: NURSE PRACTITIONER

## 2023-01-13 PROCEDURE — 3078F PR MOST RECENT DIASTOLIC BLOOD PRESSURE < 80 MM HG: ICD-10-PCS | Mod: HCNC,CPTII,S$GLB, | Performed by: NURSE PRACTITIONER

## 2023-01-13 PROCEDURE — 1101F PR PT FALLS ASSESS DOC 0-1 FALLS W/OUT INJ PAST YR: ICD-10-PCS | Mod: HCNC,CPTII,S$GLB, | Performed by: NURSE PRACTITIONER

## 2023-01-13 PROCEDURE — 1126F AMNT PAIN NOTED NONE PRSNT: CPT | Mod: HCNC,CPTII,S$GLB, | Performed by: NURSE PRACTITIONER

## 2023-01-13 PROCEDURE — 3288F FALL RISK ASSESSMENT DOCD: CPT | Mod: HCNC,CPTII,S$GLB, | Performed by: NURSE PRACTITIONER

## 2023-01-13 PROCEDURE — 3008F BODY MASS INDEX DOCD: CPT | Mod: HCNC,CPTII,S$GLB, | Performed by: NURSE PRACTITIONER

## 2023-01-13 PROCEDURE — 1160F PR REVIEW ALL MEDS BY PRESCRIBER/CLIN PHARMACIST DOCUMENTED: ICD-10-PCS | Mod: HCNC,CPTII,S$GLB, | Performed by: NURSE PRACTITIONER

## 2023-01-13 PROCEDURE — 99999 PR PBB SHADOW E&M-EST. PATIENT-LVL V: CPT | Mod: PBBFAC,HCNC,, | Performed by: NURSE PRACTITIONER

## 2023-01-13 PROCEDURE — 3074F SYST BP LT 130 MM HG: CPT | Mod: HCNC,CPTII,S$GLB, | Performed by: NURSE PRACTITIONER

## 2023-01-13 PROCEDURE — 1101F PT FALLS ASSESS-DOCD LE1/YR: CPT | Mod: HCNC,CPTII,S$GLB, | Performed by: NURSE PRACTITIONER

## 2023-01-13 PROCEDURE — 3078F DIAST BP <80 MM HG: CPT | Mod: HCNC,CPTII,S$GLB, | Performed by: NURSE PRACTITIONER

## 2023-01-13 PROCEDURE — 1159F MED LIST DOCD IN RCRD: CPT | Mod: HCNC,CPTII,S$GLB, | Performed by: NURSE PRACTITIONER

## 2023-01-13 PROCEDURE — 99499 UNLISTED E&M SERVICE: CPT | Mod: HCNC,S$GLB,, | Performed by: NURSE PRACTITIONER

## 2023-01-13 PROCEDURE — 99999 PR PBB SHADOW E&M-EST. PATIENT-LVL V: ICD-10-PCS | Mod: PBBFAC,HCNC,, | Performed by: NURSE PRACTITIONER

## 2023-01-13 PROCEDURE — 99213 PR OFFICE/OUTPT VISIT, EST, LEVL III, 20-29 MIN: ICD-10-PCS | Mod: HCNC,S$GLB,, | Performed by: NURSE PRACTITIONER

## 2023-01-13 PROCEDURE — 99213 OFFICE O/P EST LOW 20 MIN: CPT | Mod: HCNC,S$GLB,, | Performed by: NURSE PRACTITIONER

## 2023-01-13 PROCEDURE — 3008F PR BODY MASS INDEX (BMI) DOCUMENTED: ICD-10-PCS | Mod: HCNC,CPTII,S$GLB, | Performed by: NURSE PRACTITIONER

## 2023-01-13 PROCEDURE — 3074F PR MOST RECENT SYSTOLIC BLOOD PRESSURE < 130 MM HG: ICD-10-PCS | Mod: HCNC,CPTII,S$GLB, | Performed by: NURSE PRACTITIONER

## 2023-01-13 PROCEDURE — 99499 RISK ADDL DX/OHS AUDIT: ICD-10-PCS | Mod: HCNC,S$GLB,, | Performed by: NURSE PRACTITIONER

## 2023-01-13 PROCEDURE — 1159F PR MEDICATION LIST DOCUMENTED IN MEDICAL RECORD: ICD-10-PCS | Mod: HCNC,CPTII,S$GLB, | Performed by: NURSE PRACTITIONER

## 2023-01-13 NOTE — PROGRESS NOTES
"Chronic patient Established Note (Follow up visit)    HPI:  Ca Coats is a 69 y.o. female who presents today with left knee pain. Her pain has been going on for "too long."  She was previously treated by Dr. Iyer at [facility].  She has injections [id number] with him.  These were helpful, but she does not know if the injections were steroid or viscosupplementation.   This pain is described in detail below.     Interval History (4/30/2018):  The patient returns to clinic today for follow up and records review. We did received records from [facility] including a MRI of her knee. Dr. Iyer's last office visit note from January 2017 does not include any previous injections. She continues to report bilateral knee pain, left greater than right. She describes this pain as constant and aching. This pain is worse with prolonged walking. She also report right shoulder pain with prolonged overhead activity. She continues to take Percocet with benefit. She denies any other health changes.     Interval History (5/31/2018):  The patient returns to clinic for follow up. She is s/p left knee Synvisc One injection on 5/8/2018. She reports 35% relief of her knee pain. She continues to report bilateral knee pain, left greater than right. She describes this pain as constant and aching. Her pain is worse with prolonged walking and standing. She is scheduled to see Orthopedics at the  End of this month. She is also following up with Bariatrics to discuss the lap band procedure. She continues to take Percocet with benefit. She also uses Voltaren gel with benefit but this is expensive for her. She denies any other health changes.          Interval History (7/2/2018):  The patient returns to clinic today for follow up. She continues to report left knee pain that is constant, sharp, and aching in nature. Her pain is worse with prolonged walking and standing. She is scheduled for weight loss surgery in August. She continues " "follow up with orthopedics who does not recommend surgery at this time due to her BMI. She continues to take Percocet as needed with benefit. She denies any other health changes.      Interval History (7/19/2018):  The patient returns to clinic today for follow up. She is s/p left genicular nerve block on 7/10/2018. She reports 80% relief of her left knee pain. She reports that she was able to walk for longer distances (3 blocks) after the block. Her pain has returned to baseline. She continues to report left knee pain that is constant, sharp, and aching in nature. Her pain is worse with prolonged walking and standing. She denies any other health changes.      Interval History (8/21/2018):  The patient returns to clinic today for follow up. She is s/p left genicular cooled RFA on 7/31/2018. She reports 60% relief of her left knee pain. She reports intermittent knee pain that is sore and aching in nature. She continues to perform a home exercise routine. She is actively trying to lose weight. She continues to follow up with Bariatric Surgery at [facility]. She is considering weight loss surgery. She continues to use compound cream with benefit. She denies any other health changes.      Interval History (11/21/2018):  The patient returns to clinic today for follow up. She reports increased left knee pain this past week. She reports 2 episodes where her knee has "locked" up on her while walking. She describes her knee pain as sore and aching. She is actively trying to lose weight and quit smoking. She continues to use the compound cream with benefit. She denies any other health changes.      Interval History (1/9/2019):  The patient returns to clinic today for follow up. She is s/p left knee steroid injection on 12/21/2018. She reports one day of relief. She continues to report left knee pain that is constant, sharp, and aching in nature. Her pain is worse with standing and walking. She reports that her knee "locks" up on " her while walking. She often feels as though she is going to fall. She is scheduled for bariatric weight loss surgery in February at [facility]. She denies any other health changes.      Interval History (2/28/2019):  The patient returns to clinic today for follow up. She is s/p Euflexxa series completed on 1/30/2019. She reports that she able to stand for longer periods of time since the procedure. She continues to report left knee pain that is sore and aching in nature. Her pain is worse with prolonged standing and walking. She was previously scheduled for weight loss surgery but reports this was canceled. She is scheduled for follow up next week about this. She denies any other health changes.      Interval History (4/12/2019):  The patient returns for f/u of left knee pain.  She is s/p left genicular cooled RFA with about 90% pain relief.  She has been able to walk easier.  She also notices less stiffness in her knee.  She is planning on gastric surgery prior to knee replacement.  She had benefit with compounding cream but it is no longer covered by her insurance.  She does take Percocet from her PCP.  Her pain today is 7/10.     Interval History (7/11/19):  Patient reported to ED today for increased L knee pain and an episode of LLE weakness that lead to a near fall. Patient is s/p L genicular RFA in 3/19/19 that provided 90% relief for 2 months then came back. Pain is a crunchy pain, in left knee, that does not radiate, worse with movement, better with rest. Pain today is 9/10. She normally uses cane to ambulate but is currently using wheelchair at hospital. Denies any trauma ot the area, fever/chills, n/v, abdominal pain, or HA.     Interval History (8/8/2019):  She returns today for follow up s/p left knee genicular chemodenervation with phenol 7/19/19.  She reports that this procedure did not provide any improvement in her left knee symptoms, and she is still having significant difficulty with ambulation, still  uses wheelchair for mobility. She also reports left lateral thigh and hip pain lateral upper thigh numbness. She has been admitted to [facility] but is not receiving PT there. She is still considering lap band surgery but has missed a followup appointment.      Interval History (9/4/2019):  The patient returns to clinic today for follow up. She continues to report intermittent left lateral thigh and hip pain that is tingling and numb in nature. She continues to report left knee pain. She continues to have difficulty ambulating due to pain. She is currently living in a SNF. She continues to take Gabapentin 300 mg BID. This was increased at last visit but not increased at the nursing home. She does report benefit with Percocet though it only last approximately 4-5 hours. She denies any adverse effects. She does present with a care attendant from the SNF today. Her pain today is 10/10.     Interval History (10/4/2019):  The patient returns to clinic today for follow up and medication refill. She continues to report bilateral knee pain that is aching in nature. She reports intermittent left lateral thigh pain that is tingling and shooting in nature. She reports that her pain has been improving since last visit. She has increased her activity. She continues to report benefit with Gabapentin. She continues to take Percocet as needed with benefit. She denies any other health changes. Her pain today is 0/10.     Interval History (1/6/2020):  The patient returns to clinic today for follow up. She reports increased left leg pain today that is tingling in nature. She continues to report bilateral knee pain, left greater than right. She describes this pain as constant, sharp, and aching. She is no longer living at the nursing facility. She has increased her home exercise routine. She is actively trying to quit smoking in order to move forward with bariatric surgery. She continues to report benefit with current  medication regimen. She takes Gabapentin and Percocet with benefit and without adverse effect. She denies any other health changes. Her pain today is 7/10.     Interval History (4/8/2020):  She returns today for follow up via audio.  She reports that the methocarbamol is not helping.  She continues to have a left-sided low back pain that is bothering her.  Her left knee continues to hurt.  Her right knee is hurting a little as well.  Percocet continues to help, but it is not lasting long enough.     Interval History (5/1/2020):  The patient returns for audio visit today for follow up. She continues to report bilateral knee pain, left greater than right. Her pain is worse with prolonged standing and walking. She reports that her back pain has improved. She continues to report benefit with current medication regimen. She continues to take Gabapentin and Percocet with benefit and without adverse effects. She denies any other health changes. Her pain today is 8/10.     Interval History (6/2/2020):  The patient returns to clinic today via audio visit for follow up of knee pain. She reports increased left knee pain in the last week. Her pain is worse with standing and walking. She feels as though she will fall when standing. She is currently using ice, heat, and OTC lidocaine patches with limited relief. She continues to take Gabapentin and Percocet with some benefit. She denies any adverse effects. She denies any other health changes. Her pain today is 10/10.     Interval History 7/8/2020:  Ca Coats presents to the clinic for a follow-up appointment for follow up after 6/19/20 RFA Since the last visit, Ca Coats states the pain has been persistant. Current pain intensity is 10/10.    Interval History 8/11/2020:  The patient returns to clinic today for follow up of knee pain. She is s/p left knee Orthovisc injection on 7/28/2020. She reports limited relief at this time. She continues to  report left knee pain, especially with standing and walking. She feels as though her knee will buckle. She has recently quit smoking. She is actively trying to lose weight. She was recently started on Ozempic per her PCP. She is following her diet plan. She continues to take Percocet with benefit but reports that it does not last. She denies any adverse effects with this medication. She denies any other health changes. Her pain today is 8/10.    Interval History 9/3/2020:  The patient returns to clinic today for follow up of knee pain. She reports some relief with left knee Orthovisc injection in July. She continues to report left knee pain that is sharp in nature. This pain is worse with standing and walking. She feels as though her knee will give out with prolonged standing. She is actively trying to lose weight but is frustrated with lack of progress. She is following her diet plan. She continues to Percocet with benefit but it does not last. She denies any adverse effects. She denies any other health changes. Her pain today is 7/10.    Interval History 12/1/2020:  The patient returns to clinic today for follow up of knee pain. She reports increased left knee pain over the last few weeks. She also reports that her left knee feels weaker over the last month. She feels as thought it will give out with standing and walking. She would like to repeat RFA as she feels this helps her feel more stable and decreases her pain. She continues to take Percocet with some benefit but asks if this can be stronger. She denies any adverse effects with this medication. She continues to follow up with Bariatrics. She continues to follow a diet plan and take Ozempic. She denies any other health changes. Her pain today is 10/10.    Interval History 3/24/2021:  The patient returns to clinic today for follow up of knee pain. She is s/p left genicular RFA on 1/22/2021. She reports some benefit of her left knee pain. She is able to stand  and walking for longer periods of time. She continues to report left knee pain. Since last visit, she had angiogram that was normal. She has discontinued anticoagulation. She also had a recent EGD which she is awaiting the results. She continues to see Bariatrics. She is actively trying to lose weight. She continues to take Gabapentin with benefit. She continues to take Percocet with benefit and without adverse effects. She denies any other health changes. Her pain today is 0/10.    Interval History 6/1/2021:  The patient returns to clinic today for follow up of knee pain. She reports increased left knee pain over the last two weeks. She has increased pain with standing and walking. She states that she can feel the bones rubbing together. She continues to follow up with Bariatrics. She is actively trying to lose weight. She continues to follow up with GI. She is hoping to pursue gastric bypass. She continues to take Gabapentin with benefit. She continues to take Percocet as needed with benefit and without adverse effects. She denies any other health changes. Her pain today is 6/10.    Interval History 7/27/2021:  DARCY Coats presents to the clinic for a follow-up appointment. Since the last visit, DARCY Coats states the pain has been worsening. Current pain intensity is 7/10. Patient had an RFA in the left knee in January 2021. She had really good relief until about June. She was given an hyaulorinic injection at that time. She had good relief for a few weeks but now she is back to the same amount of pain as before that injection. She is having pain with standing and walking. She is continuing to take percocet and it completely gets rid of her pain      Of note, patient was found to have gastric cancer. She is currently on chemotherapy. The plan is to continue chemo until the cancer can be surgically removed.     Interval History 9/14/2021:  The patient returns to clinic today for follow up of knee pain  via audio visit. She is s/p left genicular RFA on 7/30/2021. She reports 50% relief of her knee pain. She continues to report left knee pain with prolonged standing and walking. Since last visit, she has developed a breast abscess. This was drained yesterday. She is on antibiotics. She continues to undergo chemotherapy for gastric cancer. She continues to take Gabapentin with benefit. She continues to take Percocet with benefit and without adverse effects. She denies any other health changes. Her pain today is 2/10.     Interval History 11/9/2021:  The patient returns to clinic today for follow up of knee pain. She reports increased left knee pain, worse with standing and walking. Since last visit, she was admitted to the hospital for diarrhea and dehydration. She continues to undergo chemotherapy treatment for gastric cancer. She continues to take Gabapentin with benefit. She continues to take Percocet as needed with benefit and without adverse effects. She denies any other health changes. Her pain today is 7/10.       Interval History 12/28/2021:  The patient returns to clinic today for follow up of knee pain. She is s/p left knee Monovisc injection on 12/14/2021. She reports 70% relief of her left knee pain. She continues to report intermittent left knee pain, worse with standing and walking. She reports bilateral feet swelling, left greater than right. She has not seen her PCP. She has completed chemotherapy. She is scheduled for EGD in January to monitor tumor size with possible resection. She will likely undergo chemotherapy again after the scope. She continues to take Gabapentin with benefit. She continues to take Percocet as needed with benefit and without adverse effects. She denies any other health changes. Her pain today is 0/10.    Interval History 3/21/2022:  The patient returns to clinic today for follow up of knee pain. She report increased left knee pain, worse with prolonged standing and walking.  She did have repeat EGD with biopsies done. She is currently undergoing chemotherapy and radiation. She continues to take Percocet as needed with benefit and without adverse effects. She denies any other health changes. Her pain today is 0/10.    Interval History 5/16/2022:  The patient returns to clinic today for follow up of knee pain. She is s/p left genicular RFA on 4/22/2022. She reports 80% relief of her knee pain. She continues to report intermittent left knee pain. Her pain is worse with prolonged activity. She has completed radiation and chemotherapy. She is awaiting further testing to determine plan. She continues to take Percocet as needed with benefit and without adverse effects. She denies any other health changes. Her pain today is 2/10.    Interval History 8/16/2022:  The patient returns to clinic today for follow up of knee pain. She continues to report left knee pain. Her pain is much improved since RFA. Her pain is worse with prolonged standing and walking. She reports intermittent right lower leg pain, below her knee. She continues to follow up with Hem/Onc for gastric cancer. She continues to report benefit with Percocet. This allows her to perform her ADLs. She denies any adverse effects. She denies any other health changes. Her pain today is 0/10.    Interval History 11/16/2022:  The patient returns to clinic today for follow up of knee pain. She reports increased left knee pain over the last few weeks. Her pain is worse with prolonged standing, walking, and activity. She denies any right knee pain. She continues to follow up with Hem/Onc. She continues to report benefit with current medication regimen. She continues to take Gabapentin. She continues to take Percocet as needed with benefit and without adverse effects. She denies any other health changes. Her pain today is 6/10.    Interval History 1/13/2023:  The patient returns to clinic today for follow up of knee pain. She is s/p left  genicular RFA on 12/23/2022. She reports 75-80% relief of her left knee pain. She reports intermittent knee pain, worse with prolonged activity. She continues to take Gabapentin. She continues to take Percocet as needed with benefit. She denies any adverse effects. She denies any other health changes. Her pain today is 0/10.      Pain Disability Index Review:  Last 3 PDI Scores 1/13/2023 11/16/2022 8/16/2022   Pain Disability Index (PDI) 14 27 0         Physical Therapy: Yes, she did this in 2018 for one month (twice a week). She does HEP (stretching and ROM in the morning)      Pain Medications:    Current:  Gabapentin, tylenol 500mg Q6 prn, Percocet 5/325 mg TID PRN    Opioid Contract: yes     report:  Reviewed and consistent with medication use as prescribed.    Pain Procedures:   5/8/2018- Left knee Synvisc One injection  7/10/2018- Left genicular nerve block  7/31/2018- Left genicular cooled RFA  12/21/2018- Left knee steroid injection  1/31/2019- Left knee Euflexxa series  3/19/2019 Left genicular cooled RFA- 90% relief  7/19/19- Left knee genicular phenol chemodenervation-  6/19/2020- Left genicular RFA  1/22/2021- Left genicular RFA  6/2021- Left knee Orthovisc injection  7/30/2021- Left genicular RFA  12/14/2021- Left knee Monovisc injection  4/22/2022- Left genicular RFA  12/23/2022- Left genicular RFA- 75-80% relief    Physical Therapy//Home Exercise: yes, she did this in 2018 for one month (twice a week). She does HEP (stretching and ROM in the morning)    Imaging:   MRI Left Knee 7/21/2017 (in media):  The medial meniscus is intact. The lateral meniscus is intact.      A chronic complete ACL tear is noted. The posterior cruciate ligament is intact. The medial and lateral retinacula are intact. The medial collateral ligament is intact. The lateral collateral ligament in intact. The posterolateral corner structures are intact.      There is full thickness fissuring of the central aspect medial femoral  cartilage. The lateral tibiofemoral compartment cartilage is intact. There is broad thickness defect within the central trochlear cartilage. There is mild lateral patellofemoral cartilage thinning and regional full thickness loss of the central superior patellar cartilage.      A small effusion is present. There is trace infrapatellar fluid. Tiny popliteal cyst is noted. Subtle focal marrow edema is seen within the posterior tibial plateau. The bone marrow signal is heterogeneous likely reflecting marrow reconversion.      There is trace pes anserine bursitis. The tendons are otherwise normal.      Grade 2 fatty streaking of the semimembranosus and vastus lateralis muscles are noted. There is mild prepatellar edema.      MRI Right Shoulder 7/21/2017 (in media):   There is a complete tear of the supraspinatus tendon with retraction to the glenohumeral joint. There is partial tearing of the anterior infraspinatus articular surface. Mild subscapularis tendinosis is noted. The teres minor is intact. A small amount of subacromial/subdeltoid fluid is noted. The proximal long head biceps tendon is poorly visualized proximally although intact distally.      Degenerative tearing of the anterosuperior through posterosuperior labrum is noted. There is moderate to severe superior glenoid cartilage thinning with subchondral degenerative changes. The glenohumeral ligaments appear grossly intact.      An os acromiale is seen with fluid and osteophytosis at its junction with the base of the acromion. There is moderate superior subluxation of the AC joint with mild osteophytosis.      Small effusion with subcoracoid extension is noted.      There is grade 2/3 fatty atrophy of the supraspinatus muscle, grade 2 of the infraspinatus and subscapularis. Heterogeneous signal is seen throughout the bone marrow likely reflecting marrow reconversion.        Allergies: Review of patient's allergies indicates:  No Known Allergies    Current  Medications:   Current Outpatient Medications   Medication Sig Dispense Refill    acetaminophen (TYLENOL) 500 MG tablet       amLODIPine (NORVASC) 10 MG tablet TAKE 1 TABLET ONE TIME DAILY 90 tablet 11    atorvastatin (LIPITOR) 20 MG tablet TAKE 1 TABLET EVERY DAY 90 tablet 11    B-complex with vitamin C (Z-BEC OR EQUIV) tablet Take 1 tablet by mouth once daily.       CALCIUM CITRATE-VITAMIN D2 ORAL Take 1 tablet by mouth once daily.       citalopram (CELEXA) 10 MG tablet TAKE 1 TABLET EVERY DAY 90 tablet 1    dextran 70-hypromellose (TEARS) ophthalmic solution Place 1 drop into the right eye every 6 (six) hours. 30 mL 0    ELIQUIS 5 mg Tab TAKE 1 TABLET TWICE DAILY 180 tablet 2    ferrous gluconate (FERGON) 324 MG tablet TAKE 1 TABLET (324 MG TOTAL) BY MOUTH DAILY WITH BREAKFAST. 90 tablet 11    furosemide (LASIX) 80 MG tablet Take 1 tablet (80 mg total) by mouth 2 (two) times a day. 60 tablet 11    gabapentin (NEURONTIN) 300 MG capsule Take 2 capsules (600 mg total) by mouth 3 (three) times daily. 540 capsule 2    ibuprofen (ADVIL,MOTRIN) 600 MG tablet Take 1 tablet (600 mg total) by mouth every 8 (eight) hours as needed for Pain. 10 tablet 0    lactulose (CHRONULAC) 10 gram/15 mL solution Take 30 mLs (20 g total) by mouth 2 (two) times daily as needed (constipation). 120 mL 1    LIDOcaine-prilocaine (EMLA) cream Apply topically as needed (prior to port access). 30 g 0    lisinopriL (PRINIVIL,ZESTRIL) 40 MG tablet TAKE 1 TABLET ONE TIME DAILY 90 tablet 11    multivitamin with minerals tablet Take 1 tablet by mouth once daily.       oxyCODONE-acetaminophen (PERCOCET) 5-325 mg per tablet Take 1 tablet by mouth every 8 (eight) hours as needed for Pain. 90 tablet 0    OZEMPIC 1 mg/dose (4 mg/3 mL) INJECT 1 MG INTO THE SKIN EVERY 7 DAYS. 9 pen 11    pantoprazole (PROTONIX) 40 MG tablet Take 1 tablet (40 mg total) by mouth Daily. 90 tablet 0     No current facility-administered medications for this visit.      Facility-Administered Medications Ordered in Other Visits   Medication Dose Route Frequency Provider Last Rate Last Admin    0.9%  NaCl infusion   Intravenous Continuous Tevin Clark MD   Stopped at 01/13/22 1055       REVIEW OF SYSTEMS:    GENERAL:  No weight loss, malaise or fevers.  HEENT:  Negative for frequent or significant headaches.  NECK:  Negative for lumps, goiter, pain and significant neck swelling.  RESPIRATORY:  Negative for cough, wheezing or shortness of breath. +LAURA  CARDIOVASCULAR:  Negative for chest pain, leg swelling or palpitations. HTN  GI:  Negative for abdominal discomfort, blood in stools or black stools or change in bowel habits. +GERD, gastric cancer actively in chemo  MUSCULOSKELETAL:  See HPI  SKIN:  Negative for lesions, rash, and itching.  PSYCH:  Positive for  mood disorder and recent psychosocial stressors. +sleep disturbance  HEMATOLOGY/LYMPHOLOGY:  Negative for prolonged bleeding, bruising easily or swollen nodes.  NEURO:   No history of headaches, syncope, paralysis, seizures or tremors.  ENDO: Thyroid disorder.   All other reviewed and negative other than HPI.    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    GERD (gastroesophageal reflux disease)     Glaucoma suspect     History of gastric ulcer     History of psychiatric hospitalization     Hx of psychiatric care     Celexa, no recall of charted Effexor, Lexapro, Desyrel prescriptions    Hyperlipidemia     Hypertension     Hypothyroidism     Morbid obesity     Neuromuscular disorder     Sleep apnea     Thyroid disease        Past Surgical History:  Past Surgical History:   Procedure Laterality Date    APPENDECTOMY      CARDIAC SURGERY      CATARACT EXTRACTION W/   INTRAOCULAR LENS IMPLANT Bilateral     MFIOL OU    CORONARY ANGIOGRAPHY Bilateral 2/24/2021    Procedure: ANGIOGRAM, CORONARY ARTERY;  Surgeon: Cresencio Ridley MD;  Location: [facility] CATH LAB;  Service: Cardiology;  Laterality: Bilateral;  Covid test needed - ok per Danay MORENOU. Pt. w/o transportation or family    ENDOSCOPIC ULTRASOUND OF UPPER GASTROINTESTINAL TRACT N/A 3/17/2021    Procedure: ULTRASOUND, UPPER GI TRACT, ENDOSCOPIC;  Surgeon: Gerald Anaya MD;  Location: [facility] ENDO (2ND FLR);  Service: Endoscopy;  Laterality: N/A;  Pt unable to get a ride for swab. Will do rapid test at 8:30 - ttr    ENDOSCOPIC ULTRASOUND OF UPPER GASTROINTESTINAL TRACT N/A 1/13/2022    Procedure: ULTRASOUND, UPPER GI TRACT, ENDOSCOPIC;  Surgeon: Kevin Carballo MD;  Location: [facility] (2ND FLR);  Service: Endoscopy;  Laterality: N/A;  blood thinner approval received, see telephone encounter 1/7/22-BB  rapid  instructions given verbally and sent to address on file in pt's chart-BB    ENDOSCOPIC ULTRASOUND OF UPPER GASTROINTESTINAL TRACT N/A 10/11/2022    Procedure: ULTRASOUND, UPPER GI TRACT, ENDOSCOPIC;  Surgeon: Dequan Ridley MD;  Location: [facility] (2ND FLR);  Service: Endoscopy;  Laterality: N/A;    ESOPHAGOGASTRODUODENOSCOPY N/A 2/5/2021    Procedure: EGD (ESOPHAGOGASTRODUODENOSCOPY);  Surgeon: Matthew Hernadez MD;  Location: [facility] (2ND FLR);  Service: Endoscopy;  Laterality: N/A;  BMI-58    Wt: 361#     COVID test at [facility] on 2/2-GT    ESOPHAGOGASTRODUODENOSCOPY N/A 3/17/2021    Procedure: EGD (ESOPHAGOGASTRODUODENOSCOPY);  Surgeon: Gerald Anaya MD;  Location: [facility] ENDO (2ND FLR);  Service: Endoscopy;  Laterality: N/A;    ESOPHAGOGASTRODUODENOSCOPY N/A 5/25/2021    Procedure: EGD (ESOPHAGOGASTRODUODENOSCOPY);  Surgeon: Kevin Carballo MD;  Location: [facility] ENDO (2ND FLR);  Service: Endoscopy;  Laterality: N/A;  ESD 2 hours  Full COVID vaccination on file. ttr    ESOPHAGOGASTRODUODENOSCOPY N/A 1/13/2022     Procedure: EGD (ESOPHAGOGASTRODUODENOSCOPY);  Surgeon: Kevin Carballo MD;  Location: [facility] ENDO (2ND FLR);  Service: Endoscopy;  Laterality: N/A;  blood thinner approval, see telephone encounter 1/7/22-BB  rapid  instructions given verbally and sent to address on file in pt's chart-BB    ESOPHAGOGASTRODUODENOSCOPY N/A 10/11/2022    Procedure: EGD (ESOPHAGOGASTRODUODENOSCOPY);  Surgeon: Dequan Ridley MD;  Location: [facility] ENDO (2ND FLR);  Service: Endoscopy;  Laterality: N/A;  She is do for EGD/EUS now. Personal history of gastric cancer. Ca Coats #[id number].   Thanks,   Kevin Fuentes MD    Pt is fully vaccinated-DS  9/14/22-Approval to hold Davian rec'd from Dr. Pelaez (see telephone encounter 9/14/22)-DS  9/14/22-Ins    EYE SURGERY      INJECTION OF ANESTHETIC AGENT AROUND NERVE Left 7/10/2018    Procedure: BLOCK, NERVE;  Surgeon: Samantha Vidal MD;  Location: [facility] PAIN MGT;  Service: Pain Management;  Laterality: Left;  Genicular     INJECTION OF ANESTHETIC AGENT AROUND NERVE Left 7/19/2019    Procedure: Block, Nerve NERVE BLOCK GENICULAR WITH PHENOL 6%;  Surgeon: Samantha Vidal MD;  Location: [facility] PAIN MGT;  Service: Pain Management;  Laterality: Left;  NEEDS CONSENT    INSERTION OF TUNNELED CENTRAL VENOUS CATHETER (CVC) WITH SUBCUTANEOUS PORT N/A 7/9/2021    Procedure: INSERTION, PORT-A-CATH;  Surgeon: Mario Paz MD;  Location: [facility] CATH LAB;  Service: Radiology;  Laterality: N/A;  PORT PLACEMENT    RADIOFREQUENCY ABLATION Left 6/19/2020    Procedure: RADIOFREQUENCY ABLATION LEFT GENICULAR;  Surgeon: Tevin Clark MD;  Location: [facility] PAIN MGT;  Service: Pain Management;  Laterality: Left;  Left Genicular RFA    RADIOFREQUENCY ABLATION Left 1/22/2021    Procedure: RADIOFREQUENCY ABLATION LEFT GENICULAR;  Surgeon: Tevin Clark MD;  Location: [facility] PAIN MGT;  Service: Pain Management;  Laterality: Left;    RADIOFREQUENCY ABLATION Left 7/30/2021    Procedure: RADIOFREQUENCY ABLATION,  "GENICULAR COOLED NEED CONSENT;  Surgeon: Tevin Clark MD;  Location: [facility] PAIN T;  Service: Pain Management;  Laterality: Left;    RADIOFREQUENCY ABLATION Left 4/22/2022    Procedure: RADIOFREQUENCY ABLATION, GENICULAR COOLED , LEFT;  Surgeon: Tevin Clark MD;  Location: [facility] PAIN MGT;  Service: Pain Management;  Laterality: Left;    RADIOFREQUENCY ABLATION Left 12/23/2022    Procedure: RADIOFREQUENCY ABLATION LEFT GENICULAR COOLED CLEARED TO HOLD ELIQUIS 3 DAYS  NEEDS CONSENT;  Surgeon: Tevin Clark MD;  Location: [facility] PAIN T;  Service: Pain Management;  Laterality: Left;    TONSILLECTOMY         Family History:  Family History   Problem Relation Age of Onset    No Known Problems Mother     No Known Problems Father     Colon cancer Neg Hx     Esophageal cancer Neg Hx        Social History:  Social History     Socioeconomic History    Marital status:     Number of children: 2   Occupational History     Comment: retired  and cook   Tobacco Use    Smoking status: Some Days     Years: 50.00     Types: Cigarettes    Smokeless tobacco: Never    Tobacco comments:     currently smoking <5 cigarettes most days   Substance and Sexual Activity    Alcohol use: Yes     Comment: raely    Drug use: No    Sexual activity: Not Currently     Partners: Male   Social History Narrative    2 children (dtr in area, son in [place]) and she has 3 grandkids (in [place]),    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    Originally from [place]       OBJECTIVE:    /72 (BP Location: Right arm, Patient Position: Sitting)   Pulse 77   Temp 97.5 °F (36.4 °C) (Oral)   Resp 19   Ht 5' 6" (1.676 m)   Wt 123.6 kg (272 lb 7.8 oz)   BMI 43.98 kg/m²     PHYSICAL EXAMINATION:    General appearance: Well appearing, in no acute distress, alert and oriented x3.  Psych:  Mood and affect appropriate.  Skin: Skin color, texture, turgor normal, no rashes or lesions, in both upper and lower body.  Head/face:  " Atraumatic, normocephalic.   Pulm: Symmetric chest rise, no respiratory distress noted.   Extremities:  No deformities or skin discoloration. Good capillary refill. +2 pitting edema to bilateral feet.   Musculoskeletal: There is mild pain with palpation over medial and lateral joint line of left knee. Limited ROM without pain. Crepitus noted to left knee. Right knee maneuvers are negative. No atrophy or tone abnormalities are noted.  Neuro:  Plantar response are downgoing. No loss of sensation is noted.  Gait: Antalgic- ambulates with wheelchair.     ASSESSMENT: 72 y.o. year old female with left knee pain, consistent with the followin. Chronic pain of left knee        2. Primary osteoarthritis of left knee        3. Gastric adenocarcinoma        4. Chronic pain disorder        5. Encounter for monitoring opioid maintenance therapy            PLAN:     - Previous imaging reviewed today. Labs reviewed.     - She is s/p left genicular RFA with benefit. We can repeat this as needed.      - We can repeat left knee Monovisc injection as needed.     - Continue follow up with Oncology.     - Pain contract signed 2020.    - Continue Percocet 5/325 mg TID PRN. She does not need a refill today.     - The patient is here today for a refill of current pain medications and they believe these provide effective pain control and improvements in quality of life.  The patient notes no serious side effects, and feels the benefits outweigh the risks.  The patient was reminded of the pain contract that they signed previously as well as the risks and benefits of the medication including possible death.  The updated [place] Board [employer] prescription monitoring program was reviewed, and the patient has been found to be compliant with current treatment plan. Medication management provided by Dr. Clark.     - UDS from 2022 reviewed and consistent.     - Continue Gabapentin.     - I have stressed the importance of  physical activity and a home exercise plan to help with pain and improve health.    - RTC in 3 months.     The above plan and management options were discussed at length with patient. Patient is in agreement with the above and verbalized understanding.    Savanna Hyatt NP  01/13/2023

## 2023-01-25 DIAGNOSIS — M25.562 CHRONIC PAIN OF LEFT KNEE: ICD-10-CM

## 2023-01-25 DIAGNOSIS — G89.29 CHRONIC PAIN OF LEFT KNEE: ICD-10-CM

## 2023-01-25 DIAGNOSIS — G89.4 CHRONIC PAIN DISORDER: ICD-10-CM

## 2023-01-25 DIAGNOSIS — C16.9 GASTRIC ADENOCARCINOMA: ICD-10-CM

## 2023-01-25 DIAGNOSIS — M17.12 PRIMARY OSTEOARTHRITIS OF LEFT KNEE: ICD-10-CM

## 2023-01-25 RX ORDER — OXYCODONE AND ACETAMINOPHEN 5; 325 MG/1; MG/1
1 TABLET ORAL EVERY 8 HOURS PRN
Qty: 90 TABLET | Refills: 0 | Status: SHIPPED | OUTPATIENT
Start: 2023-01-25 | End: 2023-02-24 | Stop reason: SDUPTHER

## 2023-02-08 DIAGNOSIS — Z78.0 MENOPAUSE: ICD-10-CM

## 2023-02-09 DIAGNOSIS — Z00.00 ENCOUNTER FOR MEDICARE ANNUAL WELLNESS EXAM: ICD-10-CM

## 2023-02-24 DIAGNOSIS — C16.9 GASTRIC ADENOCARCINOMA: ICD-10-CM

## 2023-02-24 DIAGNOSIS — M25.562 CHRONIC PAIN OF LEFT KNEE: ICD-10-CM

## 2023-02-24 DIAGNOSIS — M17.12 PRIMARY OSTEOARTHRITIS OF LEFT KNEE: ICD-10-CM

## 2023-02-24 DIAGNOSIS — G89.29 CHRONIC PAIN OF LEFT KNEE: ICD-10-CM

## 2023-02-24 DIAGNOSIS — G89.4 CHRONIC PAIN DISORDER: Primary | ICD-10-CM

## 2023-02-24 RX ORDER — OXYCODONE AND ACETAMINOPHEN 5; 325 MG/1; MG/1
1 TABLET ORAL EVERY 8 HOURS PRN
Qty: 90 TABLET | Refills: 0 | Status: SHIPPED | OUTPATIENT
Start: 2023-02-25 | End: 2023-03-27 | Stop reason: SDUPTHER

## 2023-02-24 NOTE — TELEPHONE ENCOUNTER
Patient requesting refill on  oxyCODONE-acetaminophen (PERCOCET) 5-325 mg   Last office visit 01/13/23   shows last refill on 01/25/23  Patient does have a pain contract on file with [facility]  Patient last UDS 08/16/22 was consistent with current therapy     OXYCODONE  Present  Present

## 2023-02-24 NOTE — TELEPHONE ENCOUNTER
----- Message from Arron Wu sent at 2/24/2023  1:12 PM CST -----  Who Called:        Refill or New Rx: refill         RX Name and Strength:  oxyCODONE-acetaminophen (PERCOCET) 5-325 mg per tablet          Is this a 30 day or 90 day RX        Preferred Pharmacy with phone number: [facility]        Local or Mail Order: local           Would the patient rather a call back or a response via [facility]? Call back         Best Call Back Number:        Additional Information:

## 2023-02-27 DIAGNOSIS — F43.20 ADJUSTMENT DISORDER, UNSPECIFIED TYPE: ICD-10-CM

## 2023-02-28 RX ORDER — CITALOPRAM 10 MG/1
TABLET ORAL
Qty: 90 TABLET | Refills: 3 | Status: SHIPPED | OUTPATIENT
Start: 2023-02-28

## 2023-02-28 NOTE — TELEPHONE ENCOUNTER
Refill Decision Note   Ca Coats  is requesting a refill authorization.  Brief Assessment and Rationale for Refill:  Approve     Medication Therapy Plan:       Medication Reconciliation Completed: No   Comments:     No Care Gaps recommended.     Note composed:1:58 AM 02/28/2023

## 2023-02-28 NOTE — TELEPHONE ENCOUNTER
No new care gaps identified.  [facility] Embedded Care Gaps. Reference number: [id number]. 2/27/2023   7:35:59 PM CST

## 2023-03-27 DIAGNOSIS — G89.4 CHRONIC PAIN DISORDER: ICD-10-CM

## 2023-03-27 DIAGNOSIS — M17.12 PRIMARY OSTEOARTHRITIS OF LEFT KNEE: Primary | ICD-10-CM

## 2023-03-27 DIAGNOSIS — G89.29 CHRONIC PAIN OF LEFT KNEE: ICD-10-CM

## 2023-03-27 DIAGNOSIS — M25.562 CHRONIC PAIN OF LEFT KNEE: ICD-10-CM

## 2023-03-27 DIAGNOSIS — C16.9 GASTRIC ADENOCARCINOMA: ICD-10-CM

## 2023-03-27 NOTE — TELEPHONE ENCOUNTER
Patient requesting refill on oxyCODONE-acetaminophen (PERCOCET) 5-325 mg   Last office visit 01/13/23   shows last refill on 02/27/23  Patient does have a pain contract on file with [facility]  Patient last UDS 08/16/22 was consistent with current therapy     CODEINE  Not Detected  Not Detected    MORPHINE  Not Detected  Not Detected    6-ACETYLMORPHINE  Not Detected  Not Detected    OXYCODONE  Present  Present    NOROYXCODONE  Present  Present    OXYMORPHONE  Present  Not Detected    NOROXYMORPHONE  Present  Not Detected    HYDROCODONE  Not Detected  Not Detected    NORHYDROCODONE  Not Detected  Not Detected    HYDROMORPHONE  Not Detected  Not Detected    BUPRENORPHINE  Not Detected  Not Detected    NORUBPRENORPHINE  Not Detected  Not Detected    FENTANYL  Not Detected  Not Detected    NORFENTANYL  Not Detected  Not Detected    MEPERIDINE METABOLITE  Not Detected  Not Detected    TAPENTADOL  Not Detected  Not Detected    TAPENTADOL-O-SULF  Not Detected  Not Detected    METHADONE  Not Detected  Not Detected    TRAMADOL  Not Detected  Not Detected    AMPHETAMINE  Not Detected  Not Detected    METHAMPHETAMINE  Not Detected  Not Detected    MDMA- ECSTASY  Not Detected  Not Detected    MDA  Not Detected  Not Detected    MDEA- Genoveva  Not Detected  Not Detected    METHYLPHENIDATE  Not Detected  Not Detected    PHENTERMINE  Not Detected

## 2023-03-27 NOTE — TELEPHONE ENCOUNTER
----- Message from Donis Mendoza sent at 3/27/2023 11:04 AM CDT -----  Can the clinic reply in [facility]: NO              Please refill the medication(s) listed below. Please call the patient when the prescription(s) is ready for  at this phone number  [phone]         Medication #1 oxyCODONE-acetaminophen (PERCOCET) 5-325 mg per tablet 90 tablet          Medication #2           Preferred Pharmacy: [facility]

## 2023-03-27 NOTE — TELEPHONE ENCOUNTER
----- Message from Donis Mendoza sent at 3/27/2023 11:58 AM CDT -----  Can the clinic reply in [facility]: NO                 Please refill the medication(s) listed below. Please call the patient when the prescription(s) is ready for  at this phone number  [phone]           Medication #1 oxyCODONE-acetaminophen (PERCOCET) 5-325 mg per tablet 90 tablet           Medication #2             Preferred Pharmacy: [facility]

## 2023-03-28 RX ORDER — OXYCODONE AND ACETAMINOPHEN 5; 325 MG/1; MG/1
1 TABLET ORAL EVERY 8 HOURS PRN
Qty: 90 TABLET | Refills: 0 | Status: SHIPPED | OUTPATIENT
Start: 2023-03-29 | End: 2023-04-26 | Stop reason: SDUPTHER

## 2023-04-05 ENCOUNTER — OFFICE VISIT (OUTPATIENT)
Dept: HEMATOLOGY/ONCOLOGY | Facility: CLINIC | Age: 73
End: 2023-04-05
Payer: MEDICARE

## 2023-04-05 ENCOUNTER — INFUSION (OUTPATIENT)
Dept: INFUSION THERAPY | Facility: OTHER | Age: 73
End: 2023-04-05
Attending: STUDENT IN AN ORGANIZED HEALTH CARE EDUCATION/TRAINING PROGRAM
Payer: MEDICARE

## 2023-04-05 VITALS
RESPIRATION RATE: 16 BRPM | DIASTOLIC BLOOD PRESSURE: 64 MMHG | HEIGHT: 66 IN | SYSTOLIC BLOOD PRESSURE: 125 MMHG | TEMPERATURE: 98 F | OXYGEN SATURATION: 98 % | HEART RATE: 67 BPM | BODY MASS INDEX: 43.93 KG/M2 | WEIGHT: 273.38 LBS

## 2023-04-05 DIAGNOSIS — I50.32 CHRONIC DIASTOLIC CONGESTIVE HEART FAILURE: ICD-10-CM

## 2023-04-05 DIAGNOSIS — C16.9 GASTRIC ADENOCARCINOMA: Primary | ICD-10-CM

## 2023-04-05 DIAGNOSIS — Z86.718 HISTORY OF DVT (DEEP VEIN THROMBOSIS): ICD-10-CM

## 2023-04-05 PROCEDURE — 3078F DIAST BP <80 MM HG: CPT | Mod: CPTII,S$GLB,, | Performed by: STUDENT IN AN ORGANIZED HEALTH CARE EDUCATION/TRAINING PROGRAM

## 2023-04-05 PROCEDURE — 99499 UNLISTED E&M SERVICE: CPT | Mod: HCNC,S$GLB,, | Performed by: STUDENT IN AN ORGANIZED HEALTH CARE EDUCATION/TRAINING PROGRAM

## 2023-04-05 PROCEDURE — 3074F PR MOST RECENT SYSTOLIC BLOOD PRESSURE < 130 MM HG: ICD-10-PCS | Mod: CPTII,S$GLB,, | Performed by: STUDENT IN AN ORGANIZED HEALTH CARE EDUCATION/TRAINING PROGRAM

## 2023-04-05 PROCEDURE — A4216 STERILE WATER/SALINE, 10 ML: HCPCS | Performed by: STUDENT IN AN ORGANIZED HEALTH CARE EDUCATION/TRAINING PROGRAM

## 2023-04-05 PROCEDURE — 3074F SYST BP LT 130 MM HG: CPT | Mod: CPTII,S$GLB,, | Performed by: STUDENT IN AN ORGANIZED HEALTH CARE EDUCATION/TRAINING PROGRAM

## 2023-04-05 PROCEDURE — 99999 PR PBB SHADOW E&M-EST. PATIENT-LVL III: ICD-10-PCS | Mod: PBBFAC,,, | Performed by: STUDENT IN AN ORGANIZED HEALTH CARE EDUCATION/TRAINING PROGRAM

## 2023-04-05 PROCEDURE — 99499 RISK ADDL DX/OHS AUDIT: ICD-10-PCS | Mod: HCNC,S$GLB,, | Performed by: STUDENT IN AN ORGANIZED HEALTH CARE EDUCATION/TRAINING PROGRAM

## 2023-04-05 PROCEDURE — 1160F RVW MEDS BY RX/DR IN RCRD: CPT | Mod: CPTII,S$GLB,, | Performed by: STUDENT IN AN ORGANIZED HEALTH CARE EDUCATION/TRAINING PROGRAM

## 2023-04-05 PROCEDURE — 99999 PR PBB SHADOW E&M-EST. PATIENT-LVL III: CPT | Mod: PBBFAC,,, | Performed by: STUDENT IN AN ORGANIZED HEALTH CARE EDUCATION/TRAINING PROGRAM

## 2023-04-05 PROCEDURE — 4010F ACE/ARB THERAPY RXD/TAKEN: CPT | Mod: CPTII,S$GLB,, | Performed by: STUDENT IN AN ORGANIZED HEALTH CARE EDUCATION/TRAINING PROGRAM

## 2023-04-05 PROCEDURE — 1101F PT FALLS ASSESS-DOCD LE1/YR: CPT | Mod: CPTII,S$GLB,, | Performed by: STUDENT IN AN ORGANIZED HEALTH CARE EDUCATION/TRAINING PROGRAM

## 2023-04-05 PROCEDURE — 3078F PR MOST RECENT DIASTOLIC BLOOD PRESSURE < 80 MM HG: ICD-10-PCS | Mod: CPTII,S$GLB,, | Performed by: STUDENT IN AN ORGANIZED HEALTH CARE EDUCATION/TRAINING PROGRAM

## 2023-04-05 PROCEDURE — 1126F AMNT PAIN NOTED NONE PRSNT: CPT | Mod: CPTII,S$GLB,, | Performed by: STUDENT IN AN ORGANIZED HEALTH CARE EDUCATION/TRAINING PROGRAM

## 2023-04-05 PROCEDURE — 96523 IRRIG DRUG DELIVERY DEVICE: CPT

## 2023-04-05 PROCEDURE — 1159F MED LIST DOCD IN RCRD: CPT | Mod: CPTII,S$GLB,, | Performed by: STUDENT IN AN ORGANIZED HEALTH CARE EDUCATION/TRAINING PROGRAM

## 2023-04-05 PROCEDURE — 1160F PR REVIEW ALL MEDS BY PRESCRIBER/CLIN PHARMACIST DOCUMENTED: ICD-10-PCS | Mod: CPTII,S$GLB,, | Performed by: STUDENT IN AN ORGANIZED HEALTH CARE EDUCATION/TRAINING PROGRAM

## 2023-04-05 PROCEDURE — 1101F PR PT FALLS ASSESS DOC 0-1 FALLS W/OUT INJ PAST YR: ICD-10-PCS | Mod: CPTII,S$GLB,, | Performed by: STUDENT IN AN ORGANIZED HEALTH CARE EDUCATION/TRAINING PROGRAM

## 2023-04-05 PROCEDURE — 4010F PR ACE/ARB THEARPY RXD/TAKEN: ICD-10-PCS | Mod: CPTII,S$GLB,, | Performed by: STUDENT IN AN ORGANIZED HEALTH CARE EDUCATION/TRAINING PROGRAM

## 2023-04-05 PROCEDURE — 3288F PR FALLS RISK ASSESSMENT DOCUMENTED: ICD-10-PCS | Mod: CPTII,S$GLB,, | Performed by: STUDENT IN AN ORGANIZED HEALTH CARE EDUCATION/TRAINING PROGRAM

## 2023-04-05 PROCEDURE — 3008F PR BODY MASS INDEX (BMI) DOCUMENTED: ICD-10-PCS | Mod: CPTII,S$GLB,, | Performed by: STUDENT IN AN ORGANIZED HEALTH CARE EDUCATION/TRAINING PROGRAM

## 2023-04-05 PROCEDURE — 63600175 PHARM REV CODE 636 W HCPCS: Performed by: STUDENT IN AN ORGANIZED HEALTH CARE EDUCATION/TRAINING PROGRAM

## 2023-04-05 PROCEDURE — 99214 PR OFFICE/OUTPT VISIT, EST, LEVL IV, 30-39 MIN: ICD-10-PCS | Mod: S$GLB,,, | Performed by: STUDENT IN AN ORGANIZED HEALTH CARE EDUCATION/TRAINING PROGRAM

## 2023-04-05 PROCEDURE — 1159F PR MEDICATION LIST DOCUMENTED IN MEDICAL RECORD: ICD-10-PCS | Mod: CPTII,S$GLB,, | Performed by: STUDENT IN AN ORGANIZED HEALTH CARE EDUCATION/TRAINING PROGRAM

## 2023-04-05 PROCEDURE — 3008F BODY MASS INDEX DOCD: CPT | Mod: CPTII,S$GLB,, | Performed by: STUDENT IN AN ORGANIZED HEALTH CARE EDUCATION/TRAINING PROGRAM

## 2023-04-05 PROCEDURE — 3288F FALL RISK ASSESSMENT DOCD: CPT | Mod: CPTII,S$GLB,, | Performed by: STUDENT IN AN ORGANIZED HEALTH CARE EDUCATION/TRAINING PROGRAM

## 2023-04-05 PROCEDURE — 1126F PR PAIN SEVERITY QUANTIFIED, NO PAIN PRESENT: ICD-10-PCS | Mod: CPTII,S$GLB,, | Performed by: STUDENT IN AN ORGANIZED HEALTH CARE EDUCATION/TRAINING PROGRAM

## 2023-04-05 PROCEDURE — 25000003 PHARM REV CODE 250: Performed by: STUDENT IN AN ORGANIZED HEALTH CARE EDUCATION/TRAINING PROGRAM

## 2023-04-05 PROCEDURE — 99214 OFFICE O/P EST MOD 30 MIN: CPT | Mod: S$GLB,,, | Performed by: STUDENT IN AN ORGANIZED HEALTH CARE EDUCATION/TRAINING PROGRAM

## 2023-04-05 RX ORDER — SODIUM CHLORIDE 0.9 % (FLUSH) 0.9 %
10 SYRINGE (ML) INJECTION
Status: DISCONTINUED | OUTPATIENT
Start: 2023-04-05 | End: 2023-04-05 | Stop reason: HOSPADM

## 2023-04-05 RX ORDER — HEPARIN 100 UNIT/ML
500 SYRINGE INTRAVENOUS
Status: DISCONTINUED | OUTPATIENT
Start: 2023-04-05 | End: 2023-04-05 | Stop reason: HOSPADM

## 2023-04-05 RX ADMIN — HEPARIN 500 UNITS: 100 SYRINGE at 11:04

## 2023-04-05 RX ADMIN — SODIUM CHLORIDE, PRESERVATIVE FREE 10 ML: 5 INJECTION INTRAVENOUS at 11:04

## 2023-04-05 NOTE — PROGRESS NOTES
Hematology- Oncology Clinic Note :      4/5/2023    RFV / chief complaint- Gastric adenocarcinoma          HPI  Pt is a 72 y.o. 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   Pt presents to the clinic today for gastric cancer    Doing good.   Joint pain is stable. Physical activity increasing slowly.     Advised to quit smoking    Oncology presentation/ HPI  Pt was being evaluated for gastric bypass surgery   Feb 2021 EGD- Gastric tumor in the prepyloric region of the stomach,  March 2021 EUS- Gastric tumor in the prepyloric region of the stomach.  Both above biopsies did not show malignancy  5/25/21 EGD- Gastric tumor on the posterior wall of the gastric antrum. Complete removal was accomplished.         Reviewed past medical/surgical/social history    Past Medical History:   Diagnosis Date    YOUNG (acute kidney injury) 10/15/2021    Anemia     2018    Arthritis     BMI 60.0-69.9, adult     Cataract     Chronic diastolic congestive heart failure 1/27/2021    Depression     Dry eye syndrome     Gastric adenocarcinoma 5/25/2021    GERD (gastroesophageal reflux disease)     Glaucoma suspect     History of gastric ulcer     History of psychiatric hospitalization     Hx of psychiatric care     Celexa, no recall of charted Effexor, Lexapro, Desyrel prescriptions    Hyperlipidemia     Hypertension     Hypothyroidism     Morbid obesity     Neuromuscular disorder     Sleep apnea     Thyroid disease       Past Surgical History:   Procedure Laterality Date     APPENDECTOMY      CARDIAC SURGERY      CATARACT EXTRACTION W/  INTRAOCULAR LENS IMPLANT Bilateral     MFIOL OU    CORONARY ANGIOGRAPHY Bilateral 2/24/2021    Procedure: ANGIOGRAM, CORONARY ARTERY;  Surgeon: Cresecnio Ridley MD;  Location: [facility] CATH LAB;  Service: Cardiology;  Laterality: Bilateral;  Covid test needed - ok per Danay SSCU. Pt. w/o transportation or family    ENDOSCOPIC ULTRASOUND OF UPPER GASTROINTESTINAL TRACT N/A 3/17/2021    Procedure: ULTRASOUND, UPPER GI TRACT, ENDOSCOPIC;  Surgeon: Gerald Anaya MD;  Location: [facility] SAM (2ND FLR);  Service: Endoscopy;  Laterality: N/A;  Pt unable to get a ride for swab. Will do rapid test at 8:30 - ttr    ENDOSCOPIC ULTRASOUND OF UPPER GASTROINTESTINAL TRACT N/A 1/13/2022    Procedure: ULTRASOUND, UPPER GI TRACT, ENDOSCOPIC;  Surgeon: Kevin Carballo MD;  Location: [facility] (2ND FLR);  Service: Endoscopy;  Laterality: N/A;  blood thinner approval received, see telephone encounter 1/7/22-BB  rapid  instructions given verbally and sent to address on file in pt's chart-BB    ENDOSCOPIC ULTRASOUND OF UPPER GASTROINTESTINAL TRACT N/A 10/11/2022    Procedure: ULTRASOUND, UPPER GI TRACT, ENDOSCOPIC;  Surgeon: Dequan Ridley MD;  Location: [facility] (2ND FLR);  Service: Endoscopy;  Laterality: N/A;    ESOPHAGOGASTRODUODENOSCOPY N/A 2/5/2021    Procedure: EGD (ESOPHAGOGASTRODUODENOSCOPY);  Surgeon: Matthew Hernadez MD;  Location: [facility] (2ND FLR);  Service: Endoscopy;  Laterality: N/A;  BMI-58    Wt: 361#     COVID test at [facility] on 2/2-GT    ESOPHAGOGASTRODUODENOSCOPY N/A 3/17/2021    Procedure: EGD (ESOPHAGOGASTRODUODENOSCOPY);  Surgeon: Gerald Anaya MD;  Location: [facility] ENDO (2ND FLR);  Service: Endoscopy;  Laterality: N/A;    ESOPHAGOGASTRODUODENOSCOPY N/A 5/25/2021    Procedure: EGD (ESOPHAGOGASTRODUODENOSCOPY);  Surgeon: Kevin Carballo MD;  Location: [facility] SAM (2ND FLR);  Service: Endoscopy;  Laterality: N/A;  ESD 2 hours  Full COVID vaccination on  file. ttr    ESOPHAGOGASTRODUODENOSCOPY N/A 1/13/2022    Procedure: EGD (ESOPHAGOGASTRODUODENOSCOPY);  Surgeon: Kevin Carballo MD;  Location: [facility] ENDO (2ND FLR);  Service: Endoscopy;  Laterality: N/A;  blood thinner approval, see telephone encounter 1/7/22-BB  rapid  instructions given verbally and sent to address on file in pt's chart-BB    ESOPHAGOGASTRODUODENOSCOPY N/A 10/11/2022    Procedure: EGD (ESOPHAGOGASTRODUODENOSCOPY);  Surgeon: Dequan Ridley MD;  Location: [facility] ENDO (2ND FLR);  Service: Endoscopy;  Laterality: N/A;  She is do for EGD/EUS now. Personal history of gastric cancer. Ca Coats #[id number].   Thanks,   Kevin Fuentes MD    Pt is fully vaccinated-DS  9/14/22-Approval to hold Eliquis rec'd from Dr. Pelaez (see telephone encounter 9/14/22)-DS  9/14/22-Ins    EYE SURGERY      INJECTION OF ANESTHETIC AGENT AROUND NERVE Left 7/10/2018    Procedure: BLOCK, NERVE;  Surgeon: Samantha Vidal MD;  Location: [facility], [place], operated by [facility] PAIN MGT;  Service: Pain Management;  Laterality: Left;  Genicular     INJECTION OF ANESTHETIC AGENT AROUND NERVE Left 7/19/2019    Procedure: Block, Nerve NERVE BLOCK GENICULAR WITH PHENOL 6%;  Surgeon: Samantha Vidal MD;  Location: [facility], [place], operated by [facility] PAIN MGT;  Service: Pain Management;  Laterality: Left;  NEEDS CONSENT    INSERTION OF TUNNELED CENTRAL VENOUS CATHETER (CVC) WITH SUBCUTANEOUS PORT N/A 7/9/2021    Procedure: INSERTION, PORT-A-CATH;  Surgeon: Mario Paz MD;  Location: [facility], [place], operated by [facility] CATH LAB;  Service: Radiology;  Laterality: N/A;  PORT PLACEMENT    RADIOFREQUENCY ABLATION Left 6/19/2020    Procedure: RADIOFREQUENCY ABLATION LEFT GENICULAR;  Surgeon: Tevin Clark MD;  Location: [facility], [place], operated by [facility] PAIN MGT;  Service: Pain Management;  Laterality: Left;  Left Genicular RFA    RADIOFREQUENCY ABLATION Left 1/22/2021    Procedure: RADIOFREQUENCY ABLATION LEFT GENICULAR;  Surgeon: Tevin Clark MD;  Location: [facility], [place], operated by [facility] PAIN MGT;  Service: Pain Management;  Laterality: Left;    RADIOFREQUENCY ABLATION  Left 7/30/2021    Procedure: RADIOFREQUENCY ABLATION, GENICULAR COOLED NEED CONSENT;  Surgeon: Tevin Clark MD;  Location: [facility] PAIN MGT;  Service: Pain Management;  Laterality: Left;    RADIOFREQUENCY ABLATION Left 4/22/2022    Procedure: RADIOFREQUENCY ABLATION, GENICULAR COOLED , LEFT;  Surgeon: Tevin Clark MD;  Location: [facility] PAIN MGT;  Service: Pain Management;  Laterality: Left;    RADIOFREQUENCY ABLATION Left 12/23/2022    Procedure: RADIOFREQUENCY ABLATION LEFT GENICULAR COOLED CLEARED TO HOLD ELIQUIS 3 DAYS  NEEDS CONSENT;  Surgeon: Tevin Clark MD;  Location: [facility] PAIN MGT;  Service: Pain Management;  Laterality: Left;    TONSILLECTOMY        (Not in a hospital admission)      Review of patient's allergies indicates:  No Known Allergies   Social History     Tobacco Use    Smoking status: Some Days     Years: 50.00     Types: Cigarettes    Smokeless tobacco: Never    Tobacco comments:     currently smoking <5 cigarettes most days   Substance Use Topics    Alcohol use: Yes     Comment: raely      Family History   Problem Relation Age of Onset    No Known Problems Mother     No Known Problems Father     Colon cancer Neg Hx     Esophageal cancer Neg Hx           Review of Systems :  Review of Systems   Constitutional:  Positive for malaise/fatigue. Negative for chills, diaphoresis, fever and weight loss.   HENT: Negative.  Negative for congestion, hearing loss, nosebleeds, sore throat and tinnitus.    Eyes:  Negative for blurred vision, discharge and redness.   Respiratory:  Negative for cough, hemoptysis, sputum production, shortness of breath and wheezing.    Cardiovascular:  Negative for chest pain, palpitations and leg swelling.   Gastrointestinal:  Negative for abdominal pain, blood in stool, constipation, diarrhea, heartburn, melena, nausea and vomiting.   Genitourinary: Negative.    Musculoskeletal:  Positive for joint pain. Negative for back pain, falls and myalgias.   Skin:  Negative for  "itching and rash.   Neurological:  Negative for dizziness, tingling, sensory change, speech change, focal weakness, seizures, loss of consciousness, weakness and headaches.   Endo/Heme/Allergies: Negative.  Does not bruise/bleed easily.   Psychiatric/Behavioral: Negative.  Negative for depression. The patient is not nervous/anxious and does not have insomnia.              Physical Exam :  /64 (BP Location: Left arm, Patient Position: Sitting, BP Method: Small (Automatic)) Comment (BP Location): wrist  Pulse 67   Temp 98.3 °F (36.8 °C) (Oral)   Resp 16   Ht 5' 6" (1.676 m)   Wt 124 kg (273 lb 5.9 oz)   SpO2 98%   BMI 44.12 kg/m²   Wt Readings from Last 3 Encounters:   04/05/23 124 kg (273 lb 5.9 oz)   01/13/23 123.6 kg (272 lb 7.8 oz)   01/04/23 123.9 kg (273 lb 2.4 oz)       Body mass index is 44.12 kg/m².      Physical Exam  Vitals and nursing note reviewed.   Constitutional:       General: She is not in acute distress.     Appearance: She is well-developed. She is not ill-appearing.   HENT:      Head: Normocephalic and atraumatic.      Right Ear: External ear normal.      Left Ear: External ear normal.      Mouth/Throat:      Pharynx: No oropharyngeal exudate.   Eyes:      General: No scleral icterus.     Conjunctiva/sclera:      Right eye: No hemorrhage.     Left eye: No hemorrhage.  Neck:      Thyroid: No thyromegaly.      Trachea: No tracheal deviation.   Cardiovascular:      Rate and Rhythm: Normal rate and regular rhythm.      Heart sounds: Normal heart sounds. No murmur heard.  Pulmonary:      Effort: Pulmonary effort is normal. No respiratory distress.      Breath sounds: Normal breath sounds. No wheezing or rales.      Comments: Port SOI  Abdominal:      General: Bowel sounds are normal. There is no distension (obese).      Palpations: Abdomen is soft. There is no mass.      Tenderness: There is no abdominal tenderness. There is no rebound.   Musculoskeletal:         General: No swelling or " tenderness. Normal range of motion.      Cervical back: Normal range of motion and neck supple.   Lymphadenopathy:      Cervical: No cervical adenopathy.   Skin:     General: Skin is warm.      Findings: No erythema, rash or wound.   Neurological:      Mental Status: She is alert and oriented to person, place, and time.      Cranial Nerves: No cranial nerve deficit.      Gait: Gait abnormal (uses cane).   Psychiatric:         Behavior: Behavior normal.           Current Outpatient Medications   Medication Sig Dispense Refill    acetaminophen (TYLENOL) 500 MG tablet       amLODIPine (NORVASC) 10 MG tablet TAKE 1 TABLET ONE TIME DAILY 90 tablet 11    atorvastatin (LIPITOR) 20 MG tablet TAKE 1 TABLET EVERY DAY 90 tablet 11    B-complex with vitamin C (Z-BEC OR EQUIV) tablet Take 1 tablet by mouth once daily.       CALCIUM CITRATE-VITAMIN D2 ORAL Take 1 tablet by mouth once daily.       citalopram (CELEXA) 10 MG tablet TAKE 1 TABLET EVERY DAY 90 tablet 3    dextran 70-hypromellose (TEARS) ophthalmic solution Place 1 drop into the right eye every 6 (six) hours. 30 mL 0    ELIQUIS 5 mg Tab TAKE 1 TABLET TWICE DAILY 180 tablet 2    ferrous gluconate (FERGON) 324 MG tablet TAKE 1 TABLET (324 MG TOTAL) BY MOUTH DAILY WITH BREAKFAST. 90 tablet 11    furosemide (LASIX) 80 MG tablet TAKE 1 TABLET TWICE DAILY 180 tablet 3    gabapentin (NEURONTIN) 300 MG capsule Take 2 capsules (600 mg total) by mouth 3 (three) times daily. 540 capsule 2    ibuprofen (ADVIL,MOTRIN) 600 MG tablet Take 1 tablet (600 mg total) by mouth every 8 (eight) hours as needed for Pain. 10 tablet 0    lactulose (CHRONULAC) 10 gram/15 mL solution Take 30 mLs (20 g total) by mouth 2 (two) times daily as needed (constipation). 120 mL 1    LIDOcaine-prilocaine (EMLA) cream Apply topically as needed (prior to port access). 30 g 0    lisinopriL (PRINIVIL,ZESTRIL) 40 MG tablet TAKE 1 TABLET ONE TIME DAILY 90 tablet 11    multivitamin with minerals tablet Take 1  tablet by mouth once daily.       oxyCODONE-acetaminophen (PERCOCET) 5-325 mg per tablet Take 1 tablet by mouth every 8 (eight) hours as needed for Pain. 90 tablet 0    OZEMPIC 1 mg/dose (4 mg/3 mL) INJECT 1 MG INTO THE SKIN EVERY 7 DAYS. 9 pen 11    pantoprazole (PROTONIX) 40 MG tablet TAKE 1 TABLET EVERY DAY 90 tablet 11     No current facility-administered medications for this visit.     Facility-Administered Medications Ordered in Other Visits   Medication Dose Route Frequency Provider Last Rate Last Admin    0.9%  NaCl infusion   Intravenous Continuous Tevin Clark MD   Stopped at 01/13/22 1055    heparin, porcine (PF) 100 unit/mL injection flush 500 Units  500 Units Intravenous PRN Cathy Pelaez MD        sodium chloride 0.9% flush 10 mL  10 mL Intravenous PRN Cathy Pelaez MD           Pertinent Diagnostic studies:      Lab Visit on 04/05/2023   Component Date Value Ref Range Status    WBC 04/05/2023 7.97  3.90 - 12.70 K/uL Final    RBC 04/05/2023 3.47 (L)  4.00 - 5.40 M/uL Final    Hemoglobin 04/05/2023 11.4 (L)  12.0 - 16.0 g/dL Final    Hematocrit 04/05/2023 36.0 (L)  37.0 - 48.5 % Final    MCV 04/05/2023 104 (H)  82 - 98 fL Final    MCH 04/05/2023 32.9 (H)  27.0 - 31.0 pg Final    MCHC 04/05/2023 31.7 (L)  32.0 - 36.0 g/dL Final    RDW 04/05/2023 12.3  11.5 - 14.5 % Final    Platelets 04/05/2023 197  150 - 450 K/uL Final    MPV 04/05/2023 10.2  9.2 - 12.9 fL Final    Immature Granulocytes 04/05/2023 0.3  0.0 - 0.5 % Final    Gran # (ANC) 04/05/2023 5.9  1.8 - 7.7 K/uL Final    Immature Grans (Abs) 04/05/2023 0.02  0.00 - 0.04 K/uL Final    Comment: Mild elevation in immature granulocytes is non specific and   can be seen in a variety of conditions including stress response,   acute inflammation, trauma and pregnancy. Correlation with other   laboratory and clinical findings is essential.      Lymph # 04/05/2023 1.3  1.0 - 4.8 K/uL Final    Mono # 04/05/2023 0.7  0.3 - 1.0 K/uL Final    Eos #  04/05/2023 0.1  0.0 - 0.5 K/uL Final    Baso # 04/05/2023 0.02  0.00 - 0.20 K/uL Final    nRBC 04/05/2023 0  0 /100 WBC Final    Gran % 04/05/2023 73.7 (H)  38.0 - 73.0 % Final    Lymph % 04/05/2023 16.2 (L)  18.0 - 48.0 % Final    Mono % 04/05/2023 8.4  4.0 - 15.0 % Final    Eosinophil % 04/05/2023 1.1  0.0 - 8.0 % Final    Basophil % 04/05/2023 0.3  0.0 - 1.9 % Final    Differential Method 04/05/2023 Automated   Final    Sodium 04/05/2023 140  136 - 145 mmol/L Final    Potassium 04/05/2023 4.4  3.5 - 5.1 mmol/L Final    Chloride 04/05/2023 109  95 - 110 mmol/L Final    CO2 04/05/2023 27  23 - 29 mmol/L Final    Glucose 04/05/2023 110  70 - 110 mg/dL Final    BUN 04/05/2023 14  8 - 23 mg/dL Final    Creatinine 04/05/2023 0.8  0.5 - 1.4 mg/dL Final    Calcium 04/05/2023 9.4  8.7 - 10.5 mg/dL Final    Total Protein 04/05/2023 6.4  6.0 - 8.4 g/dL Final    Albumin 04/05/2023 3.2 (L)  3.5 - 5.2 g/dL Final    Total Bilirubin 04/05/2023 0.7  0.1 - 1.0 mg/dL Final    Comment: For infants and newborns, interpretation of results should be based  on gestational age, weight and in agreement with clinical  observations.    Premature Infant recommended reference ranges:  Up to 24 hours.............<8.0 mg/dL  Up to 48 hours............<12.0 mg/dL  3-5 days..................<15.0 mg/dL  6-29 days.................<15.0 mg/dL      Alkaline Phosphatase 04/05/2023 95  55 - 135 U/L Final    AST 04/05/2023 16  10 - 40 U/L Final    ALT 04/05/2023 12  10 - 44 U/L Final    Anion Gap 04/05/2023 4 (L)  8 - 16 mmol/L Final    eGFR 04/05/2023 >60  >60 mL/min/1.73 m^2 Final         Patient Active Problem List    Diagnosis Date Noted    Gastric adenocarcinoma 05/25/2021    Chronic diastolic congestive heart failure 01/27/2021    Class 3 severe obesity due to excess calories with serious comorbidity and body mass index (BMI) of 50.0 to 59.9 in adult 04/29/2019    Essential hypertension 04/29/2019    Left knee DJD 12/21/2018    Current mild  episode of major depressive disorder without prior episode 03/07/2022    History of DVT (deep vein thrombosis) 10/15/2021    Chronic pain 07/30/2021    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    Abnormal cardiovascular stress test 02/24/2021    Tobacco abuse 01/28/2021    Pure hypercholesterolemia 01/27/2021    Gastroesophageal reflux disease 03/22/2020    Osteopenia of neck of left femur 01/14/2020    Left knee pain 07/19/2019    Hypothyroidism 07/18/2019    Debility     At high risk for injury related to fall     Gait instability 07/15/2019    Major depressive disorder 04/29/2019    Joint pain 04/29/2019    Chronic knee pain 11/30/2018    Obstructive sleep apnea 08/09/2018    Primary osteoarthritis of left knee 07/10/2018     Active Problem List with Overview Notes    Diagnosis Date Noted    Gastric adenocarcinoma 05/25/2021     gastric cancer cT2 or higher      Chronic diastolic congestive heart failure 01/27/2021    Class 3 severe obesity due to excess calories with serious comorbidity and body mass index (BMI) of 50.0 to 59.9 in adult 04/29/2019    Essential hypertension 04/29/2019    Left knee DJD 12/21/2018    Current mild episode of major depressive disorder without prior episode 03/07/2022    History of DVT (deep vein thrombosis) 10/15/2021    Chronic pain 07/30/2021    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    Abnormal cardiovascular stress test 02/24/2021    Tobacco abuse 01/28/2021    Pure hypercholesterolemia 01/27/2021    Gastroesophageal reflux disease 03/22/2020    Osteopenia of neck of left femur 01/14/2020    Left knee pain 07/19/2019    Hypothyroidism 07/18/2019    Debility     At high risk for injury related to fall     Gait instability 07/15/2019    Major depressive disorder 04/29/2019    Joint pain 04/29/2019    Chronic knee pain 11/30/2018    Obstructive sleep apnea 08/09/2018    Primary osteoarthritis of left knee 07/10/2018          Oncology History   Gastric adenocarcinoma   2/5/2021 Procedure    EGD  Feb 2021 EGD- Gastric tumor in the prepyloric region of the stomach,     3/17/2021 Procedure    EUS  Impression:           - Gastric tumor in the prepyloric region of the                         stomach. Biopsied. Lesion appears amenable to                         endoscopic resection (ESD) - Dr. Carballo was present                         to view the lesion.      5/25/2021 Initial Diagnosis    Gastric adenocarcinoma     5/25/2021 Pathology Significant Finding    Adenocarcinoma, moderate to poorly differentiated   Tumor invades deep layers of submuocsa with deep margin extensively positive   Deep margin is extensively positive   Lateral margins are negative for neoplasia or malignancy      5/25/2021 Cancer Staged    Staging form: Stomach, AJCC 8th Edition  - Pathologic stage from 5/25/2021: Stage Unknown (pT1, pNX, cM0)     6/18/2021 Imaging Significant Findings    CT CAP   Asymmetric gastric wall thickening at the level of the gastric antrum presumably representing the patient's gastric adenocarcinoma.  I see no CT evidence for metastatic disease.     Low-density focus lower pole left kidney does not meet criteria for a simple cyst.  Findings can be further evaluated with ultrasound.  Additional subcentimeter low-density foci in the right kidney likely too small to characterize by imaging.     6/28/2021 Tumor Conference       [facility] UGI MULTIDISCIPLINARY TUMOR BOARD  PATIENT REVIEW FORM   ____________________________________________________________    CLINIC #: [id number]  DATE: 6/28/2021    DIAGNOSIS: gastric CA    PRESENTER: Latanya/ Donnie Sanders    PATIENT SUMMARY:   This 71 y/o female had incidental finding of gastric CA on EGD as part of bariatric evaluation given BMI 57. Underwent ESD per Dr. Carballo. Reviewed pathology - pT1b with LVI positive, extensive tumor at deep margin.     BOARD RECOMMENDATIONS:   Recommend  perioperative chemotherapy, 4 cycles of FLOT    CONSULT NEEDED:     [] Surgery    [x] Hem/Onc    [] Rad/Onc    [] Dietary                 [] Social Service    [] Psychology       [] AES  [] Radiology     ESD Pathology Stage: Tumor 1b  Node(s) 0 Metastasis 0      GROUP STAGE:  [] O    [] 1A    [] IB    [] IIA    [] IIB     [] IIIA     [] IIIB     [] IIIC    []IV  [] Local recurrence     [] Regional recurrence     [] Distant recurrence   Metastatic site(s): none         [x] Jennifer'l Treatment Guidelines reviewed and care planned is consistent with guidelines.         (i.e., NCCN, NCI, PD, ACO, AUA, etc.)    PRESENTATION AT CANCER CONFERENCE:         [x] Prospective    [] Retrospective     [] Follow-Up       7/21/2021 - 9/30/2021 Chemotherapy    Treatment Summary   Plan Name: OP GASTRIC FLOT - FLUOROURACIL LEUCOVORIN OXALIPLATIN DOCETAXEL Q2W  Treatment Goal: Curative  Status: Inactive  Start Date: 7/21/2021  End Date: 9/30/2021  Provider: Cathy Pelaez MD  Chemotherapy: DOCEtaxeL (TAXOTERE) 50 mg/m2 = 135 mg in sodium chloride 0.9% 250 mL chemo infusion, 50 mg/m2 = 135 mg, Intravenous, Clinic/HOD 1 time, 4 of 4 cycles  Dose modification: 40 mg/m2 (original dose 50 mg/m2, Cycle 3)  Administration: 135 mg (7/21/2021), 135 mg (8/12/2021), 105 mg (9/15/2021), 105 mg (9/29/2021)  leucovorin calcium 200 mg/m2 = 545 mg in dextrose 5 % 250 mL infusion, 200 mg/m2 = 545 mg, Intravenous, Clinic/HOD 1 time, 4 of 4 cycles  Administration: 545 mg (7/21/2021), 535 mg (8/12/2021), 530 mg (9/15/2021), 525 mg (9/29/2021)  oxaliplatin (ELOXATIN) 85 mg/m2 = 232 mg in dextrose 5 % 500 mL chemo infusion, 85 mg/m2 = 232 mg, Intravenous, Clinic/HOD 1 time, 4 of 4 cycles  Administration: 232 mg (7/21/2021), 228 mg (8/12/2021), 225 mg (9/15/2021), 223 mg (9/29/2021)  fluorouraciL 7,000 mg in sodium chloride 0.9% 240 mL chemo infusion, 7,100 mg, Intravenous, Over 24 hours, 4 of 11 cycles  Dose modification: 2,000 mg/m2 (original dose 2,600 mg/m2,  Cycle 3), 225 mg/m2/day (original dose 2,600 mg/m2, Cycle 5)  Administration: 7,000 mg (7/21/2021), 6,970 mg (8/12/2021), 5,300 mg (9/15/2021), 5,000 mg (9/29/2021)     9/16/2021 Imaging Significant Findings    Thrombosis of the right popliteal, posterior tibial, and peroneal veins.     11/15/2021 Imaging Significant Findings    CT CAP Overall, no detrimental change compared to previous.  Persistent eccentric wall thickening at the level of the antrum in this patient with provided history of gastric adenocarcinoma.  No metastatic disease.     Subcentimeter pulmonary nodules unchanged.  No new nodules.     Cholelithiasis     1/13/2022 Procedure    EUS  Impression:            - Normal esophagus.                          - Scar in the gastric antrum. Biopsied.                          - Congested, nodular and ulcerated mucosa in the                          antrum. Biopsied. Treated with argon plasma                          coagulation (APC).                          - Acquired deformity in the prepyloric region of                          the stomach.                          - Normal examined duodenum.                          - There was abnormal echogenicity in the                          visualized portion of the liver. This was                          hyperechoic and homogenous. This can be seen with                          fatty liver.                          - Hyperechoic material consistent with sludge was                          visualized endosonographically in the gallbladder.                          - There was dilation in the common bile duct which                          measured up to 10.8 mm.                          - Wall thickening was seen in the antrum of the                          stomach. This probable related to previous ESD.      2/14/2022 -  Chemotherapy    Treatment Summary   Plan Name: OP FLUOROURACIL (5 DAY) QW + XRT  Treatment Goal: Curative  Status: Active  Start Date:  2/14/2022  End Date: 3/30/2022  Provider: Cathy Pelaez MD  Chemotherapy: [No matching medication found in this treatment plan]     2/14/2022 - 3/29/2022 Radiation Therapy    Treating physician: Dr. Sonu Darden  Total Dose: 50.4 Gy  Fractions: 28    Course: C1 Abdomen 2022    Treatment Site Ref. ID Energy Dose/Fx (Gy) #Fx Dose Correction (Gy) Total Dose (Gy) Start Date End Date Elapsed Days   IM Stomach PTV_Low 6X 1.8 25 / 25 0 45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

## 2023-04-24 RX ORDER — DEXTROSE 4 G
TABLET,CHEWABLE ORAL
Refills: 0 | OUTPATIENT
Start: 2023-04-24

## 2023-04-24 RX ORDER — LANCETS 33 GAUGE
EACH MISCELLANEOUS
Qty: 100 EACH | Refills: 0 | OUTPATIENT
Start: 2023-04-24

## 2023-04-24 RX ORDER — ISOPROPYL ALCOHOL 70 ML/100ML
SWAB TOPICAL
Refills: 0 | OUTPATIENT
Start: 2023-04-24

## 2023-04-24 NOTE — TELEPHONE ENCOUNTER
Care Due:                  Date            Visit Type   Department     Provider  --------------------------------------------------------------------------------                                EP -                              PRIMARY      [facility] INTERNAL  Last Visit: 11-      CARE ([facility])   MEDICINE       Lei Garcia                               -                              PRIMARY      NOM INTERNAL  Next Visit: 05-      CARE ([facility])   MEDICINE       Lei Garcia                                                            Last  Test          Frequency    Reason                     Performed    Due Date  --------------------------------------------------------------------------------    HBA1C.......  6 months...  OZEMPIC..................  06- 12-    Lipid Panel.  12 months..  atorvastatin.............  Not Found    Overdue    [employer] Embedded Care Gaps. Reference number: [id number]. 4/24/2023   11:53:16 AM CDT  
Refill Decision Note   Ca Coats  is requesting a refill authorization.  Brief Assessment and Rationale for Refill:  Quick Discontinue     Medication Therapy Plan: Pharmacy is requesting new scripts for the following medications without required information, (sig/ frequency/qty/etc)     Medication Reconciliation Completed: No   Comments:     No Care Gaps recommended.     Note composed:2:50 PM 04/24/2023          
- - -

## 2023-04-28 ENCOUNTER — TELEPHONE (OUTPATIENT)
Dept: INTERNAL MEDICINE | Facility: CLINIC | Age: 73
End: 2023-04-28
Payer: MEDICARE

## 2023-04-28 DIAGNOSIS — Z12.31 ENCOUNTER FOR SCREENING MAMMOGRAM FOR MALIGNANT NEOPLASM OF BREAST: ICD-10-CM

## 2023-04-28 DIAGNOSIS — Z12.31 SCREENING MAMMOGRAM, ENCOUNTER FOR: Primary | ICD-10-CM

## 2023-04-28 NOTE — TELEPHONE ENCOUNTER
----- Message from Rosario Joel sent at 4/28/2023 12:41 PM CDT -----  Regarding: Orders  Type: mammo         Caller is requesting to schedule their mammo appointment prior to annual appointment.    Order is not listed in EPIC.  Please enter order and contact patient to schedule.         Name of Caller: LU RENAE [[id number]]         Preferred Date and Time of Labs:         Date of EPP Appointment:         Where would they like the lab performed?         Would the patient rather a call back or a response via [email or web address]?          Best Call Back Number: [phone]         Additional Information:

## 2023-05-01 ENCOUNTER — OFFICE VISIT (OUTPATIENT)
Dept: PAIN MEDICINE | Facility: CLINIC | Age: 73
End: 2023-05-01
Payer: MEDICARE

## 2023-05-01 ENCOUNTER — TELEPHONE (OUTPATIENT)
Dept: INTERNAL MEDICINE | Facility: CLINIC | Age: 73
End: 2023-05-01
Payer: MEDICARE

## 2023-05-01 VITALS
DIASTOLIC BLOOD PRESSURE: 72 MMHG | WEIGHT: 277.75 LBS | BODY MASS INDEX: 44.83 KG/M2 | HEART RATE: 69 BPM | SYSTOLIC BLOOD PRESSURE: 120 MMHG

## 2023-05-01 DIAGNOSIS — M17.12 PRIMARY OSTEOARTHRITIS OF LEFT KNEE: ICD-10-CM

## 2023-05-01 DIAGNOSIS — M25.562 CHRONIC PAIN OF LEFT KNEE: ICD-10-CM

## 2023-05-01 DIAGNOSIS — G89.4 CHRONIC PAIN DISORDER: Primary | ICD-10-CM

## 2023-05-01 DIAGNOSIS — G89.29 CHRONIC PAIN OF LEFT KNEE: ICD-10-CM

## 2023-05-01 DIAGNOSIS — Z79.891 ENCOUNTER FOR MONITORING OPIOID MAINTENANCE THERAPY: ICD-10-CM

## 2023-05-01 DIAGNOSIS — Z51.81 ENCOUNTER FOR MONITORING OPIOID MAINTENANCE THERAPY: ICD-10-CM

## 2023-05-01 DIAGNOSIS — C16.9 GASTRIC ADENOCARCINOMA: ICD-10-CM

## 2023-05-01 PROCEDURE — 3008F PR BODY MASS INDEX (BMI) DOCUMENTED: ICD-10-PCS | Mod: HCNC,CPTII,S$GLB, | Performed by: NURSE PRACTITIONER

## 2023-05-01 PROCEDURE — 3074F PR MOST RECENT SYSTOLIC BLOOD PRESSURE < 130 MM HG: ICD-10-PCS | Mod: HCNC,CPTII,S$GLB, | Performed by: NURSE PRACTITIONER

## 2023-05-01 PROCEDURE — 4010F PR ACE/ARB THEARPY RXD/TAKEN: ICD-10-PCS | Mod: HCNC,CPTII,S$GLB, | Performed by: NURSE PRACTITIONER

## 2023-05-01 PROCEDURE — 99999 PR PBB SHADOW E&M-EST. PATIENT-LVL IV: CPT | Mod: PBBFAC,HCNC,, | Performed by: NURSE PRACTITIONER

## 2023-05-01 PROCEDURE — 3288F PR FALLS RISK ASSESSMENT DOCUMENTED: ICD-10-PCS | Mod: HCNC,CPTII,S$GLB, | Performed by: NURSE PRACTITIONER

## 2023-05-01 PROCEDURE — 1160F RVW MEDS BY RX/DR IN RCRD: CPT | Mod: HCNC,CPTII,S$GLB, | Performed by: NURSE PRACTITIONER

## 2023-05-01 PROCEDURE — 99999 PR PBB SHADOW E&M-EST. PATIENT-LVL IV: ICD-10-PCS | Mod: PBBFAC,HCNC,, | Performed by: NURSE PRACTITIONER

## 2023-05-01 PROCEDURE — 99213 OFFICE O/P EST LOW 20 MIN: CPT | Mod: HCNC,S$GLB,, | Performed by: NURSE PRACTITIONER

## 2023-05-01 PROCEDURE — 3074F SYST BP LT 130 MM HG: CPT | Mod: HCNC,CPTII,S$GLB, | Performed by: NURSE PRACTITIONER

## 2023-05-01 PROCEDURE — 99213 PR OFFICE/OUTPT VISIT, EST, LEVL III, 20-29 MIN: ICD-10-PCS | Mod: HCNC,S$GLB,, | Performed by: NURSE PRACTITIONER

## 2023-05-01 PROCEDURE — 1159F PR MEDICATION LIST DOCUMENTED IN MEDICAL RECORD: ICD-10-PCS | Mod: HCNC,CPTII,S$GLB, | Performed by: NURSE PRACTITIONER

## 2023-05-01 PROCEDURE — 1126F PR PAIN SEVERITY QUANTIFIED, NO PAIN PRESENT: ICD-10-PCS | Mod: HCNC,CPTII,S$GLB, | Performed by: NURSE PRACTITIONER

## 2023-05-01 PROCEDURE — 1160F PR REVIEW ALL MEDS BY PRESCRIBER/CLIN PHARMACIST DOCUMENTED: ICD-10-PCS | Mod: HCNC,CPTII,S$GLB, | Performed by: NURSE PRACTITIONER

## 2023-05-01 PROCEDURE — 1159F MED LIST DOCD IN RCRD: CPT | Mod: HCNC,CPTII,S$GLB, | Performed by: NURSE PRACTITIONER

## 2023-05-01 PROCEDURE — 1126F AMNT PAIN NOTED NONE PRSNT: CPT | Mod: HCNC,CPTII,S$GLB, | Performed by: NURSE PRACTITIONER

## 2023-05-01 PROCEDURE — 4010F ACE/ARB THERAPY RXD/TAKEN: CPT | Mod: HCNC,CPTII,S$GLB, | Performed by: NURSE PRACTITIONER

## 2023-05-01 PROCEDURE — 1101F PT FALLS ASSESS-DOCD LE1/YR: CPT | Mod: HCNC,CPTII,S$GLB, | Performed by: NURSE PRACTITIONER

## 2023-05-01 PROCEDURE — 3288F FALL RISK ASSESSMENT DOCD: CPT | Mod: HCNC,CPTII,S$GLB, | Performed by: NURSE PRACTITIONER

## 2023-05-01 PROCEDURE — 1101F PR PT FALLS ASSESS DOC 0-1 FALLS W/OUT INJ PAST YR: ICD-10-PCS | Mod: HCNC,CPTII,S$GLB, | Performed by: NURSE PRACTITIONER

## 2023-05-01 PROCEDURE — 3008F BODY MASS INDEX DOCD: CPT | Mod: HCNC,CPTII,S$GLB, | Performed by: NURSE PRACTITIONER

## 2023-05-01 PROCEDURE — 3078F DIAST BP <80 MM HG: CPT | Mod: HCNC,CPTII,S$GLB, | Performed by: NURSE PRACTITIONER

## 2023-05-01 PROCEDURE — 3078F PR MOST RECENT DIASTOLIC BLOOD PRESSURE < 80 MM HG: ICD-10-PCS | Mod: HCNC,CPTII,S$GLB, | Performed by: NURSE PRACTITIONER

## 2023-05-01 RX ORDER — GABAPENTIN 300 MG/1
600 CAPSULE ORAL 3 TIMES DAILY
Qty: 540 CAPSULE | Refills: 2 | Status: SHIPPED | OUTPATIENT
Start: 2023-05-01 | End: 2023-09-06 | Stop reason: SDUPTHER

## 2023-05-01 NOTE — PROGRESS NOTES
"Chronic patient Established Note (Follow up visit)    HPI:  Ca Coats is a 69 y.o. female who presents today with left knee pain. Her pain has been going on for "too long."  She was previously treated by Dr. Iyer at [facility].  She has injections [id number] with him.  These were helpful, but she does not know if the injections were steroid or viscosupplementation.   This pain is described in detail below.     Interval History (4/30/2018):  The patient returns to clinic today for follow up and records review. We did received records from [facility] including a MRI of her knee. Dr. Iyer's last office visit note from January 2017 does not include any previous injections. She continues to report bilateral knee pain, left greater than right. She describes this pain as constant and aching. This pain is worse with prolonged walking. She also report right shoulder pain with prolonged overhead activity. She continues to take Percocet with benefit. She denies any other health changes.     Interval History (5/31/2018):  The patient returns to clinic for follow up. She is s/p left knee Synvisc One injection on 5/8/2018. She reports 35% relief of her knee pain. She continues to report bilateral knee pain, left greater than right. She describes this pain as constant and aching. Her pain is worse with prolonged walking and standing. She is scheduled to see Orthopedics at the  End of this month. She is also following up with Bariatrics to discuss the lap band procedure. She continues to take Percocet with benefit. She also uses Voltaren gel with benefit but this is expensive for her. She denies any other health changes.          Interval History (7/2/2018):  The patient returns to clinic today for follow up. She continues to report left knee pain that is constant, sharp, and aching in nature. Her pain is worse with prolonged walking and standing. She is scheduled for weight loss surgery in August. She continues " "follow up with orthopedics who does not recommend surgery at this time due to her BMI. She continues to take Percocet as needed with benefit. She denies any other health changes.      Interval History (7/19/2018):  The patient returns to clinic today for follow up. She is s/p left genicular nerve block on 7/10/2018. She reports 80% relief of her left knee pain. She reports that she was able to walk for longer distances (3 blocks) after the block. Her pain has returned to baseline. She continues to report left knee pain that is constant, sharp, and aching in nature. Her pain is worse with prolonged walking and standing. She denies any other health changes.      Interval History (8/21/2018):  The patient returns to clinic today for follow up. She is s/p left genicular cooled RFA on 7/31/2018. She reports 60% relief of her left knee pain. She reports intermittent knee pain that is sore and aching in nature. She continues to perform a home exercise routine. She is actively trying to lose weight. She continues to follow up with Bariatric Surgery at [facility]. She is considering weight loss surgery. She continues to use compound cream with benefit. She denies any other health changes.      Interval History (11/21/2018):  The patient returns to clinic today for follow up. She reports increased left knee pain this past week. She reports 2 episodes where her knee has "locked" up on her while walking. She describes her knee pain as sore and aching. She is actively trying to lose weight and quit smoking. She continues to use the compound cream with benefit. She denies any other health changes.      Interval History (1/9/2019):  The patient returns to clinic today for follow up. She is s/p left knee steroid injection on 12/21/2018. She reports one day of relief. She continues to report left knee pain that is constant, sharp, and aching in nature. Her pain is worse with standing and walking. She reports that her knee "locks" up on " her while walking. She often feels as though she is going to fall. She is scheduled for bariatric weight loss surgery in February at [facility]. She denies any other health changes.      Interval History (2/28/2019):  The patient returns to clinic today for follow up. She is s/p Euflexxa series completed on 1/30/2019. She reports that she able to stand for longer periods of time since the procedure. She continues to report left knee pain that is sore and aching in nature. Her pain is worse with prolonged standing and walking. She was previously scheduled for weight loss surgery but reports this was canceled. She is scheduled for follow up next week about this. She denies any other health changes.      Interval History (4/12/2019):  The patient returns for f/u of left knee pain.  She is s/p left genicular cooled RFA with about 90% pain relief.  She has been able to walk easier.  She also notices less stiffness in her knee.  She is planning on gastric surgery prior to knee replacement.  She had benefit with compounding cream but it is no longer covered by her insurance.  She does take Percocet from her PCP.  Her pain today is 7/10.     Interval History (7/11/19):  Patient reported to ED today for increased L knee pain and an episode of LLE weakness that lead to a near fall. Patient is s/p L genicular RFA in 3/19/19 that provided 90% relief for 2 months then came back. Pain is a crunchy pain, in left knee, that does not radiate, worse with movement, better with rest. Pain today is 9/10. She normally uses cane to ambulate but is currently using wheelchair at hospital. Denies any trauma ot the area, fever/chills, n/v, abdominal pain, or HA.     Interval History (8/8/2019):  She returns today for follow up s/p left knee genicular chemodenervation with phenol 7/19/19.  She reports that this procedure did not provide any improvement in her left knee symptoms, and she is still having significant difficulty with ambulation, still  uses wheelchair for mobility. She also reports left lateral thigh and hip pain lateral upper thigh numbness. She has been admitted to [facility] but is not receiving PT there. She is still considering lap band surgery but has missed a followup appointment.      Interval History (9/4/2019):  The patient returns to clinic today for follow up. She continues to report intermittent left lateral thigh and hip pain that is tingling and numb in nature. She continues to report left knee pain. She continues to have difficulty ambulating due to pain. She is currently living in a SNF. She continues to take Gabapentin 300 mg BID. This was increased at last visit but not increased at the nursing home. She does report benefit with Percocet though it only last approximately 4-5 hours. She denies any adverse effects. She does present with a care attendant from the SNF today. Her pain today is 10/10.     Interval History (10/4/2019):  The patient returns to clinic today for follow up and medication refill. She continues to report bilateral knee pain that is aching in nature. She reports intermittent left lateral thigh pain that is tingling and shooting in nature. She reports that her pain has been improving since last visit. She has increased her activity. She continues to report benefit with Gabapentin. She continues to take Percocet as needed with benefit. She denies any other health changes. Her pain today is 0/10.     Interval History (1/6/2020):  The patient returns to clinic today for follow up. She reports increased left leg pain today that is tingling in nature. She continues to report bilateral knee pain, left greater than right. She describes this pain as constant, sharp, and aching. She is no longer living at the nursing facility. She has increased her home exercise routine. She is actively trying to quit smoking in order to move forward with bariatric surgery. She continues to report benefit with current  medication regimen. She takes Gabapentin and Percocet with benefit and without adverse effect. She denies any other health changes. Her pain today is 7/10.     Interval History (4/8/2020):  She returns today for follow up via audio.  She reports that the methocarbamol is not helping.  She continues to have a left-sided low back pain that is bothering her.  Her left knee continues to hurt.  Her right knee is hurting a little as well.  Percocet continues to help, but it is not lasting long enough.     Interval History (5/1/2020):  The patient returns for audio visit today for follow up. She continues to report bilateral knee pain, left greater than right. Her pain is worse with prolonged standing and walking. She reports that her back pain has improved. She continues to report benefit with current medication regimen. She continues to take Gabapentin and Percocet with benefit and without adverse effects. She denies any other health changes. Her pain today is 8/10.     Interval History (6/2/2020):  The patient returns to clinic today via audio visit for follow up of knee pain. She reports increased left knee pain in the last week. Her pain is worse with standing and walking. She feels as though she will fall when standing. She is currently using ice, heat, and OTC lidocaine patches with limited relief. She continues to take Gabapentin and Percocet with some benefit. She denies any adverse effects. She denies any other health changes. Her pain today is 10/10.     Interval History 7/8/2020:  Ca Coats presents to the clinic for a follow-up appointment for follow up after 6/19/20 RFA Since the last visit, Ca Coats states the pain has been persistant. Current pain intensity is 10/10.    Interval History 8/11/2020:  The patient returns to clinic today for follow up of knee pain. She is s/p left knee Orthovisc injection on 7/28/2020. She reports limited relief at this time. She continues to  report left knee pain, especially with standing and walking. She feels as though her knee will buckle. She has recently quit smoking. She is actively trying to lose weight. She was recently started on Ozempic per her PCP. She is following her diet plan. She continues to take Percocet with benefit but reports that it does not last. She denies any adverse effects with this medication. She denies any other health changes. Her pain today is 8/10.    Interval History 9/3/2020:  The patient returns to clinic today for follow up of knee pain. She reports some relief with left knee Orthovisc injection in July. She continues to report left knee pain that is sharp in nature. This pain is worse with standing and walking. She feels as though her knee will give out with prolonged standing. She is actively trying to lose weight but is frustrated with lack of progress. She is following her diet plan. She continues to Percocet with benefit but it does not last. She denies any adverse effects. She denies any other health changes. Her pain today is 7/10.    Interval History 12/1/2020:  The patient returns to clinic today for follow up of knee pain. She reports increased left knee pain over the last few weeks. She also reports that her left knee feels weaker over the last month. She feels as thought it will give out with standing and walking. She would like to repeat RFA as she feels this helps her feel more stable and decreases her pain. She continues to take Percocet with some benefit but asks if this can be stronger. She denies any adverse effects with this medication. She continues to follow up with Bariatrics. She continues to follow a diet plan and take Ozempic. She denies any other health changes. Her pain today is 10/10.    Interval History 3/24/2021:  The patient returns to clinic today for follow up of knee pain. She is s/p left genicular RFA on 1/22/2021. She reports some benefit of her left knee pain. She is able to stand  and walking for longer periods of time. She continues to report left knee pain. Since last visit, she had angiogram that was normal. She has discontinued anticoagulation. She also had a recent EGD which she is awaiting the results. She continues to see Bariatrics. She is actively trying to lose weight. She continues to take Gabapentin with benefit. She continues to take Percocet with benefit and without adverse effects. She denies any other health changes. Her pain today is 0/10.    Interval History 6/1/2021:  The patient returns to clinic today for follow up of knee pain. She reports increased left knee pain over the last two weeks. She has increased pain with standing and walking. She states that she can feel the bones rubbing together. She continues to follow up with Bariatrics. She is actively trying to lose weight. She continues to follow up with GI. She is hoping to pursue gastric bypass. She continues to take Gabapentin with benefit. She continues to take Percocet as needed with benefit and without adverse effects. She denies any other health changes. Her pain today is 6/10.    Interval History 7/27/2021:  DARCY Coats presents to the clinic for a follow-up appointment. Since the last visit, DARCY Coats states the pain has been worsening. Current pain intensity is 7/10. Patient had an RFA in the left knee in January 2021. She had really good relief until about June. She was given an hyaulorinic injection at that time. She had good relief for a few weeks but now she is back to the same amount of pain as before that injection. She is having pain with standing and walking. She is continuing to take percocet and it completely gets rid of her pain      Of note, patient was found to have gastric cancer. She is currently on chemotherapy. The plan is to continue chemo until the cancer can be surgically removed.     Interval History 9/14/2021:  The patient returns to clinic today for follow up of knee pain  via audio visit. She is s/p left genicular RFA on 7/30/2021. She reports 50% relief of her knee pain. She continues to report left knee pain with prolonged standing and walking. Since last visit, she has developed a breast abscess. This was drained yesterday. She is on antibiotics. She continues to undergo chemotherapy for gastric cancer. She continues to take Gabapentin with benefit. She continues to take Percocet with benefit and without adverse effects. She denies any other health changes. Her pain today is 2/10.     Interval History 11/9/2021:  The patient returns to clinic today for follow up of knee pain. She reports increased left knee pain, worse with standing and walking. Since last visit, she was admitted to the hospital for diarrhea and dehydration. She continues to undergo chemotherapy treatment for gastric cancer. She continues to take Gabapentin with benefit. She continues to take Percocet as needed with benefit and without adverse effects. She denies any other health changes. Her pain today is 7/10.       Interval History 12/28/2021:  The patient returns to clinic today for follow up of knee pain. She is s/p left knee Monovisc injection on 12/14/2021. She reports 70% relief of her left knee pain. She continues to report intermittent left knee pain, worse with standing and walking. She reports bilateral feet swelling, left greater than right. She has not seen her PCP. She has completed chemotherapy. She is scheduled for EGD in January to monitor tumor size with possible resection. She will likely undergo chemotherapy again after the scope. She continues to take Gabapentin with benefit. She continues to take Percocet as needed with benefit and without adverse effects. She denies any other health changes. Her pain today is 0/10.    Interval History 3/21/2022:  The patient returns to clinic today for follow up of knee pain. She report increased left knee pain, worse with prolonged standing and walking.  She did have repeat EGD with biopsies done. She is currently undergoing chemotherapy and radiation. She continues to take Percocet as needed with benefit and without adverse effects. She denies any other health changes. Her pain today is 0/10.    Interval History 5/16/2022:  The patient returns to clinic today for follow up of knee pain. She is s/p left genicular RFA on 4/22/2022. She reports 80% relief of her knee pain. She continues to report intermittent left knee pain. Her pain is worse with prolonged activity. She has completed radiation and chemotherapy. She is awaiting further testing to determine plan. She continues to take Percocet as needed with benefit and without adverse effects. She denies any other health changes. Her pain today is 2/10.    Interval History 8/16/2022:  The patient returns to clinic today for follow up of knee pain. She continues to report left knee pain. Her pain is much improved since RFA. Her pain is worse with prolonged standing and walking. She reports intermittent right lower leg pain, below her knee. She continues to follow up with Hem/Onc for gastric cancer. She continues to report benefit with Percocet. This allows her to perform her ADLs. She denies any adverse effects. She denies any other health changes. Her pain today is 0/10.    Interval History 11/16/2022:  The patient returns to clinic today for follow up of knee pain. She reports increased left knee pain over the last few weeks. Her pain is worse with prolonged standing, walking, and activity. She denies any right knee pain. She continues to follow up with Hem/Onc. She continues to report benefit with current medication regimen. She continues to take Gabapentin. She continues to take Percocet as needed with benefit and without adverse effects. She denies any other health changes. Her pain today is 6/10.    Interval History 1/13/2023:  The patient returns to clinic today for follow up of knee pain. She is s/p left  genicular RFA on 12/23/2022. She reports 75-80% relief of her left knee pain. She reports intermittent knee pain, worse with prolonged activity. She continues to take Gabapentin. She continues to take Percocet as needed with benefit. She denies any adverse effects. She denies any other health changes. Her pain today is 0/10.    Interval History 5/1/2023:  The patient returns to clinic today for follow up of knee pain. She continues to report left knee pain. Her pain is worse with prolonged activity. She is taking Gabapentin. She also takes Percocet as needed with benefit and without adverse effects. She denies any other health changes. Her pain today is 0/10.      Pain Disability Index Review:  Last 3 PDI Scores 5/1/2023 1/13/2023 11/16/2022   Pain Disability Index (PDI) 20 14 27         Physical Therapy: Yes, she did this in 2018 for one month (twice a week). She does HEP (stretching and ROM in the morning)      Pain Medications:    Current:  Gabapentin, tylenol 500mg Q6 prn, Percocet 5/325 mg TID PRN    Opioid Contract: yes     report:  Reviewed and consistent with medication use as prescribed.    Pain Procedures:   5/8/2018- Left knee Synvisc One injection  7/10/2018- Left genicular nerve block  7/31/2018- Left genicular cooled RFA  12/21/2018- Left knee steroid injection  1/31/2019- Left knee Euflexxa series  3/19/2019 Left genicular cooled RFA- 90% relief  7/19/19- Left knee genicular phenol chemodenervation-  6/19/2020- Left genicular RFA  1/22/2021- Left genicular RFA  6/2021- Left knee Orthovisc injection  7/30/2021- Left genicular RFA  12/14/2021- Left knee Monovisc injection  4/22/2022- Left genicular RFA  12/23/2022- Left genicular RFA- 75-80% relief    Physical Therapy//Home Exercise: yes, she did this in 2018 for one month (twice a week). She does HEP (stretching and ROM in the morning)    Imaging:   MRI Left Knee 7/21/2017 (in media):  The medial meniscus is intact. The lateral meniscus is intact.       A chronic complete ACL tear is noted. The posterior cruciate ligament is intact. The medial and lateral retinacula are intact. The medial collateral ligament is intact. The lateral collateral ligament in intact. The posterolateral corner structures are intact.      There is full thickness fissuring of the central aspect medial femoral cartilage. The lateral tibiofemoral compartment cartilage is intact. There is broad thickness defect within the central trochlear cartilage. There is mild lateral patellofemoral cartilage thinning and regional full thickness loss of the central superior patellar cartilage.      A small effusion is present. There is trace infrapatellar fluid. Tiny popliteal cyst is noted. Subtle focal marrow edema is seen within the posterior tibial plateau. The bone marrow signal is heterogeneous likely reflecting marrow reconversion.      There is trace pes anserine bursitis. The tendons are otherwise normal.      Grade 2 fatty streaking of the semimembranosus and vastus lateralis muscles are noted. There is mild prepatellar edema.      MRI Right Shoulder 7/21/2017 (in media):   There is a complete tear of the supraspinatus tendon with retraction to the glenohumeral joint. There is partial tearing of the anterior infraspinatus articular surface. Mild subscapularis tendinosis is noted. The teres minor is intact. A small amount of subacromial/subdeltoid fluid is noted. The proximal long head biceps tendon is poorly visualized proximally although intact distally.      Degenerative tearing of the anterosuperior through posterosuperior labrum is noted. There is moderate to severe superior glenoid cartilage thinning with subchondral degenerative changes. The glenohumeral ligaments appear grossly intact.      An os acromiale is seen with fluid and osteophytosis at its junction with the base of the acromion. There is moderate superior subluxation of the AC joint with mild osteophytosis.      Small  effusion with subcoracoid extension is noted.      There is grade 2/3 fatty atrophy of the supraspinatus muscle, grade 2 of the infraspinatus and subscapularis. Heterogeneous signal is seen throughout the bone marrow likely reflecting marrow reconversion.        Allergies: Review of patient's allergies indicates:  No Known Allergies    Current Medications:   Current Outpatient Medications   Medication Sig Dispense Refill    acetaminophen (TYLENOL) 500 MG tablet       amLODIPine (NORVASC) 10 MG tablet TAKE 1 TABLET ONE TIME DAILY 90 tablet 11    atorvastatin (LIPITOR) 20 MG tablet TAKE 1 TABLET EVERY DAY 90 tablet 11    B-complex with vitamin C (Z-BEC OR EQUIV) tablet Take 1 tablet by mouth once daily.       CALCIUM CITRATE-VITAMIN D2 ORAL Take 1 tablet by mouth once daily.       citalopram (CELEXA) 10 MG tablet TAKE 1 TABLET EVERY DAY 90 tablet 3    dextran 70-hypromellose (TEARS) ophthalmic solution Place 1 drop into the right eye every 6 (six) hours. 30 mL 0    ELIQUIS 5 mg Tab TAKE 1 TABLET TWICE DAILY 180 tablet 2    ferrous gluconate (FERGON) 324 MG tablet TAKE 1 TABLET (324 MG TOTAL) BY MOUTH DAILY WITH BREAKFAST. 90 tablet 11    furosemide (LASIX) 80 MG tablet TAKE 1 TABLET TWICE DAILY 180 tablet 3    gabapentin (NEURONTIN) 300 MG capsule Take 2 capsules (600 mg total) by mouth 3 (three) times daily. 540 capsule 2    ibuprofen (ADVIL,MOTRIN) 600 MG tablet Take 1 tablet (600 mg total) by mouth every 8 (eight) hours as needed for Pain. 10 tablet 0    lactulose (CHRONULAC) 10 gram/15 mL solution Take 30 mLs (20 g total) by mouth 2 (two) times daily as needed (constipation). 120 mL 1    LIDOcaine-prilocaine (EMLA) cream Apply topically as needed (prior to port access). 30 g 0    lisinopriL (PRINIVIL,ZESTRIL) 40 MG tablet TAKE 1 TABLET ONE TIME DAILY 90 tablet 11    multivitamin with minerals tablet Take 1 tablet by mouth once daily.       oxyCODONE-acetaminophen (PERCOCET) 5-325 mg per tablet Take 1 tablet by  mouth every 8 (eight) hours as needed for Pain. 90 tablet 0    OZEMPIC 1 mg/dose (4 mg/3 mL) INJECT 1 MG INTO THE SKIN EVERY 7 DAYS. 9 pen 11    pantoprazole (PROTONIX) 40 MG tablet TAKE 1 TABLET EVERY DAY 90 tablet 11     No current facility-administered medications for this visit.     Facility-Administered Medications Ordered in Other Visits   Medication Dose Route Frequency Provider Last Rate Last Admin    0.9%  NaCl infusion   Intravenous Continuous Tevin Clark MD   Stopped at 01/13/22 1055       REVIEW OF SYSTEMS:    GENERAL:  No weight loss, malaise or fevers.  HEENT:  Negative for frequent or significant headaches.  NECK:  Negative for lumps, goiter, pain and significant neck swelling.  RESPIRATORY:  Negative for cough, wheezing or shortness of breath. +LAURA  CARDIOVASCULAR:  Negative for chest pain, leg swelling or palpitations. HTN  GI:  Negative for abdominal discomfort, blood in stools or black stools or change in bowel habits. +GERD, gastric cancer actively in chemo  MUSCULOSKELETAL:  See HPI  SKIN:  Negative for lesions, rash, and itching.  PSYCH:  Positive for  mood disorder and recent psychosocial stressors. +sleep disturbance  HEMATOLOGY/LYMPHOLOGY:  Negative for prolonged bleeding, bruising easily or swollen nodes.  NEURO:   No history of headaches, syncope, paralysis, seizures or tremors.  ENDO: Thyroid disorder.   All other reviewed and negative other than HPI.    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    GERD (gastroesophageal reflux disease)     Glaucoma suspect     History of gastric ulcer     History of psychiatric hospitalization     Hx of psychiatric care     Celexa, no recall of charted Effexor, Lexapro, Desyrel prescriptions     Hyperlipidemia     Hypertension     Hypothyroidism     Morbid obesity     Neuromuscular disorder     Sleep apnea     Thyroid disease        Past Surgical History:  Past Surgical History:   Procedure Laterality Date    APPENDECTOMY      CARDIAC SURGERY      CATARACT EXTRACTION W/  INTRAOCULAR LENS IMPLANT Bilateral     MFIOL OU    CORONARY ANGIOGRAPHY Bilateral 2/24/2021    Procedure: ANGIOGRAM, CORONARY ARTERY;  Surgeon: Cresencio Ridley MD;  Location: [facility] CATH LAB;  Service: Cardiology;  Laterality: Bilateral;  Covid test needed - ok per Danay SSCU. Pt. w/o transportation or family    ENDOSCOPIC ULTRASOUND OF UPPER GASTROINTESTINAL TRACT N/A 3/17/2021    Procedure: ULTRASOUND, UPPER GI TRACT, ENDOSCOPIC;  Surgeon: Gerald Anaya MD;  Location: [facility] (2ND FLR);  Service: Endoscopy;  Laterality: N/A;  Pt unable to get a ride for swab. Will do rapid test at 8:30 - ttr    ENDOSCOPIC ULTRASOUND OF UPPER GASTROINTESTINAL TRACT N/A 1/13/2022    Procedure: ULTRASOUND, UPPER GI TRACT, ENDOSCOPIC;  Surgeon: Kevin Carballo MD;  [facility]  Service: Endoscopy;  Laterality: N/A;  blood thinner approval received, see telephone encounter 1/7/22-BB  rapid  instructions given verbally and sent to address on file in pt's chart-BB    ENDOSCOPIC ULTRASOUND OF UPPER GASTROINTESTINAL TRACT N/A 10/11/2022    Procedure: ULTRASOUND, UPPER GI TRACT, ENDOSCOPIC;  Surgeon: Dequan Ridley MD;  [facility]  Service: Endoscopy;  Laterality: N/A;    ESOPHAGOGASTRODUODENOSCOPY N/A 2/5/2021    Procedure: EGD (ESOPHAGOGASTRODUODENOSCOPY);  Surgeon: Matthew Hernadez MD;  [facility]  Service: Endoscopy;  Laterality: N/A;  BMI-58    Wt: 361#     COVID test at [facility] on 2/2-GT    ESOPHAGOGASTRODUODENOSCOPY N/A 3/17/2021    Procedure: EGD (ESOPHAGOGASTRODUODENOSCOPY);  Surgeon: Gerald Anaya MD;  [facility]O [facility]  Service: Endoscopy;  Laterality: N/A;     ESOPHAGOGASTRODUODENOSCOPY N/A 5/25/2021    Procedure: EGD (ESOPHAGOGASTRODUODENOSCOPY);  Surgeon: Kevin Carballo MD;  Location: [facility] ENDO (2ND FLR);  Service: Endoscopy;  Laterality: N/A;  ESD 2 hours  Full COVID vaccination on file. ttr    ESOPHAGOGASTRODUODENOSCOPY N/A 1/13/2022    Procedure: EGD (ESOPHAGOGASTRODUODENOSCOPY);  Surgeon: Kevin Carballo MD;  Location: [facility] ENDO (2ND FLR);  Service: Endoscopy;  Laterality: N/A;  blood thinner approval, see telephone encounter 1/7/22-BB  rapid  instructions given verbally and sent to address on file in pt's chart-BB    ESOPHAGOGASTRODUODENOSCOPY N/A 10/11/2022    Procedure: EGD (ESOPHAGOGASTRODUODENOSCOPY);  Surgeon: Dequan Ridley MD;  Location: [facility] ENDO (2ND FLR);  Service: Endoscopy;  Laterality: N/A;  She is do for EGD/EUS now. Personal history of gastric cancer. Ca Coats #[id number].   Thanks,   Kevin Fuentes MD    Pt is fully vaccinated-DS  9/14/22-Approval to hold Davian rec'd from Dr. Pelaez (see telephone encounter 9/14/22)-DS  9/14/22-Ins    EYE SURGERY      INJECTION OF ANESTHETIC AGENT AROUND NERVE Left 7/10/2018    Procedure: BLOCK, NERVE;  Surgeon: Samantha Vidal MD;  Location: [facility] [place] PAIN T;  Service: Pain Management;  Laterality: Left;  Genicular     INJECTION OF ANESTHETIC AGENT AROUND NERVE Left 7/19/2019    Procedure: Block, Nerve NERVE BLOCK GENICULAR WITH PHENOL 6%;  Surgeon: Samantha Vidal MD;  Location: [facility] [place] PAIN MGT;  Service: Pain Management;  Laterality: Left;  NEEDS CONSENT    INSERTION OF TUNNELED CENTRAL VENOUS CATHETER (CVC) WITH SUBCUTANEOUS PORT N/A 7/9/2021    Procedure: INSERTION, PORT-A-CATH;  Surgeon: Mario Paz MD;  Location: [facility] [place] CATH LAB;  Service: Radiology;  Laterality: N/A;  PORT PLACEMENT    RADIOFREQUENCY ABLATION Left 6/19/2020    Procedure: RADIOFREQUENCY ABLATION LEFT GENICULAR;  Surgeon: Tevin Clark MD;  Location: [facility] [place] operated [facility] PAIN MGT;  Service: Pain Management;  Laterality: Left;  Left Genicular  RFA    RADIOFREQUENCY ABLATION Left 1/22/2021    Procedure: RADIOFREQUENCY ABLATION LEFT GENICULAR;  Surgeon: Tevin Clark MD;  Location: [facility] PAIN MGT;  Service: Pain Management;  Laterality: Left;    RADIOFREQUENCY ABLATION Left 7/30/2021    Procedure: RADIOFREQUENCY ABLATION, GENICULAR COOLED NEED CONSENT;  Surgeon: Tevin Clark MD;  Location: [facility] PAIN MGT;  Service: Pain Management;  Laterality: Left;    RADIOFREQUENCY ABLATION Left 4/22/2022    Procedure: RADIOFREQUENCY ABLATION, GENICULAR COOLED , LEFT;  Surgeon: Tevin Clark MD;  Location: [facility] PAIN MGT;  Service: Pain Management;  Laterality: Left;    RADIOFREQUENCY ABLATION Left 12/23/2022    Procedure: RADIOFREQUENCY ABLATION LEFT GENICULAR COOLED CLEARED TO HOLD ELIQUIS 3 DAYS  NEEDS CONSENT;  Surgeon: Tevin Clark MD;  Location: [facility] PAIN MGT;  Service: Pain Management;  Laterality: Left;    TONSILLECTOMY         Family History:  Family History   Problem Relation Age of Onset    No Known Problems Mother     No Known Problems Father     Colon cancer Neg Hx     Esophageal cancer Neg Hx        Social History:  Social History     Socioeconomic History    Marital status:     Number of children: 2   Occupational History     Comment: retired  and cook   Tobacco Use    Smoking status: Some Days     Years: 50.00     Types: Cigarettes    Smokeless tobacco: Never    Tobacco comments:     currently smoking <5 cigarettes most days   Substance and Sexual Activity    Alcohol use: Yes     Comment: raely    Drug use: No    Sexual activity: Not Currently     Partners: Male   Social History Narrative    2 children (dtr in area, son in [place]) and she has 3 grandkids (in [place]),    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    Originally from [place]       OBJECTIVE:    /72   Pulse 69   Wt 126 kg (277 lb 12.5 oz)   BMI 44.83 kg/m²     PHYSICAL EXAMINATION:    General appearance: Well appearing, in no acute distress, alert and  oriented x3.  Psych:  Mood and affect appropriate.  Skin: Skin color, texture, turgor normal, no rashes or lesions, in both upper and lower body.  Head/face:  Atraumatic, normocephalic.   Pulm: Symmetric chest rise, no respiratory distress noted.   Extremities:  No deformities or skin discoloration. Good capillary refill. +2 pitting edema to bilateral feet.   Musculoskeletal: There is mild pain with palpation over medial and lateral joint line of left knee. Limited ROM with mild pain. Crepitus noted to left knee. Right knee maneuvers are negative. No atrophy or tone abnormalities are noted.  Neuro:   No loss of sensation is noted.  Gait: Antalgic- ambulates with wheelchair.     ASSESSMENT: 72 y.o. year old female with left knee pain, consistent with the followin. Chronic pain disorder  gabapentin (NEURONTIN) 300 MG capsule      2. Primary osteoarthritis of left knee  gabapentin (NEURONTIN) 300 MG capsule      3. Chronic pain of left knee  gabapentin (NEURONTIN) 300 MG capsule      4. Gastric adenocarcinoma        5. Encounter for monitoring opioid maintenance therapy              PLAN:     - Previous imaging reviewed today. Labs reviewed.     - We can repeat left genicular RFA as needed.     - We can repeat left knee Monovisc injection as needed.     - Continue follow up with Oncology.     - Pain contract signed 2020.    - Continue Percocet 5/325 mg TID PRN. Refill already sent.    - The patient is here today for a refill of current pain medications and they believe these provide effective pain control and improvements in quality of life.  The patient notes no serious side effects, and feels the benefits outweigh the risks.  The patient was reminded of the pain contract that they signed previously as well as the risks and benefits of the medication including possible death.  The updated [place] Board of Pharmacy prescription monitoring program was reviewed, and the patient has been found to be  compliant with current treatment plan. Medication management provided by Dr. Clark.     - UDS from 8/16/2022 reviewed and consistent. UDS next visit.     - Continue Gabapentin.     - I have stressed the importance of physical activity and a home exercise plan to help with pain and improve health.    - RTC in 3 months.     The above plan and management options were discussed at length with patient. Patient is in agreement with the above and verbalized understanding.    Savanna Hyatt NP  05/01/2023

## 2023-05-01 NOTE — TELEPHONE ENCOUNTER
Hi, please contact the patient to assist in scheduling    Orders Placed This Encounter    Mammo Digital Screening Yris zaidi/ [facility]       Thank you, Lei Garcia

## 2023-05-25 DIAGNOSIS — G89.29 CHRONIC PAIN OF LEFT KNEE: ICD-10-CM

## 2023-05-25 DIAGNOSIS — C16.9 GASTRIC ADENOCARCINOMA: ICD-10-CM

## 2023-05-25 DIAGNOSIS — G89.4 CHRONIC PAIN DISORDER: ICD-10-CM

## 2023-05-25 DIAGNOSIS — M17.12 PRIMARY OSTEOARTHRITIS OF LEFT KNEE: ICD-10-CM

## 2023-05-25 DIAGNOSIS — M25.562 CHRONIC PAIN OF LEFT KNEE: ICD-10-CM

## 2023-05-25 RX ORDER — OXYCODONE AND ACETAMINOPHEN 5; 325 MG/1; MG/1
1 TABLET ORAL EVERY 8 HOURS PRN
Qty: 90 TABLET | Refills: 0 | Status: SHIPPED | OUTPATIENT
Start: 2023-05-26 | End: 2023-06-27 | Stop reason: SDUPTHER

## 2023-05-25 NOTE — TELEPHONE ENCOUNTER
Patient requesting refill on Percocet 5/325mg  Last office visit 05.01.23   shows last refill on 04.28.23  Patient does have a pain contract on file with [facility]  Patient last UDS 08.16.22 was consistent with current therapy

## 2023-05-25 NOTE — TELEPHONE ENCOUNTER
----- Message from Irinahakeem Torrez sent at 5/25/2023  9:42 AM CDT -----  Regarding: refill  Type: RX Refill Request    Who Called: natalie     Have you contacted your pharmacy:    Refill or New Rx:refill     RX Name and Strength:oxyCODONE-acetaminophen (PERCOCET) 5-325 mg per tablet    How is the patient currently taking it? (ex. 1XDay):    Is this a 30 day or 90 day RX:    Preferred Pharmacy with phone number:[facility]    Local or Mail Order:    Ordering Provider:    Would the patient rather a call back or a response via [facility]? Call     Best Call Back Number:[phone]    Additional Information:

## 2023-05-29 ENCOUNTER — IMMUNIZATION (OUTPATIENT)
Dept: PHARMACY | Facility: CLINIC | Age: 73
End: 2023-05-29
Payer: MEDICARE

## 2023-05-29 ENCOUNTER — OFFICE VISIT (OUTPATIENT)
Dept: INTERNAL MEDICINE | Facility: CLINIC | Age: 73
End: 2023-05-29
Payer: MEDICARE

## 2023-05-29 VITALS
HEIGHT: 66 IN | WEIGHT: 266.75 LBS | SYSTOLIC BLOOD PRESSURE: 128 MMHG | BODY MASS INDEX: 42.87 KG/M2 | DIASTOLIC BLOOD PRESSURE: 72 MMHG | RESPIRATION RATE: 19 BRPM | HEART RATE: 74 BPM | OXYGEN SATURATION: 97 %

## 2023-05-29 DIAGNOSIS — Z12.31 ENCOUNTER FOR SCREENING MAMMOGRAM FOR MALIGNANT NEOPLASM OF BREAST: ICD-10-CM

## 2023-05-29 DIAGNOSIS — E66.01 MORBID OBESITY: ICD-10-CM

## 2023-05-29 DIAGNOSIS — I82.411 DVT OF DEEP FEMORAL VEIN, RIGHT: Primary | ICD-10-CM

## 2023-05-29 DIAGNOSIS — M25.562 CHRONIC PAIN OF LEFT KNEE: ICD-10-CM

## 2023-05-29 DIAGNOSIS — G89.29 CHRONIC PAIN OF LEFT KNEE: ICD-10-CM

## 2023-05-29 DIAGNOSIS — Z23 NEED FOR VACCINATION: Primary | ICD-10-CM

## 2023-05-29 DIAGNOSIS — F32.0 CURRENT MILD EPISODE OF MAJOR DEPRESSIVE DISORDER WITHOUT PRIOR EPISODE: ICD-10-CM

## 2023-05-29 DIAGNOSIS — Z72.0 TOBACCO ABUSE: ICD-10-CM

## 2023-05-29 PROBLEM — D84.81 IMMUNODEFICIENCY DUE TO CONDITIONS CLASSIFIED ELSEWHERE: Status: ACTIVE | Noted: 2023-05-29

## 2023-05-29 PROCEDURE — 3074F PR MOST RECENT SYSTOLIC BLOOD PRESSURE < 130 MM HG: ICD-10-PCS | Mod: CPTII,S$GLB,, | Performed by: INTERNAL MEDICINE

## 2023-05-29 PROCEDURE — 3078F DIAST BP <80 MM HG: CPT | Mod: CPTII,S$GLB,, | Performed by: INTERNAL MEDICINE

## 2023-05-29 PROCEDURE — 4010F PR ACE/ARB THEARPY RXD/TAKEN: ICD-10-PCS | Mod: CPTII,S$GLB,, | Performed by: INTERNAL MEDICINE

## 2023-05-29 PROCEDURE — 1159F MED LIST DOCD IN RCRD: CPT | Mod: CPTII,S$GLB,, | Performed by: INTERNAL MEDICINE

## 2023-05-29 PROCEDURE — 3008F PR BODY MASS INDEX (BMI) DOCUMENTED: ICD-10-PCS | Mod: CPTII,S$GLB,, | Performed by: INTERNAL MEDICINE

## 2023-05-29 PROCEDURE — 99499 UNLISTED E&M SERVICE: CPT | Mod: HCNC,S$GLB,, | Performed by: INTERNAL MEDICINE

## 2023-05-29 PROCEDURE — 1101F PT FALLS ASSESS-DOCD LE1/YR: CPT | Mod: CPTII,S$GLB,, | Performed by: INTERNAL MEDICINE

## 2023-05-29 PROCEDURE — 1160F RVW MEDS BY RX/DR IN RCRD: CPT | Mod: CPTII,S$GLB,, | Performed by: INTERNAL MEDICINE

## 2023-05-29 PROCEDURE — 99999 PR PBB SHADOW E&M-EST. PATIENT-LVL V: ICD-10-PCS | Mod: PBBFAC,,, | Performed by: INTERNAL MEDICINE

## 2023-05-29 PROCEDURE — 3008F BODY MASS INDEX DOCD: CPT | Mod: CPTII,S$GLB,, | Performed by: INTERNAL MEDICINE

## 2023-05-29 PROCEDURE — 99214 PR OFFICE/OUTPT VISIT, EST, LEVL IV, 30-39 MIN: ICD-10-PCS | Mod: S$GLB,,, | Performed by: INTERNAL MEDICINE

## 2023-05-29 PROCEDURE — 3078F PR MOST RECENT DIASTOLIC BLOOD PRESSURE < 80 MM HG: ICD-10-PCS | Mod: CPTII,S$GLB,, | Performed by: INTERNAL MEDICINE

## 2023-05-29 PROCEDURE — 1101F PR PT FALLS ASSESS DOC 0-1 FALLS W/OUT INJ PAST YR: ICD-10-PCS | Mod: CPTII,S$GLB,, | Performed by: INTERNAL MEDICINE

## 2023-05-29 PROCEDURE — 3074F SYST BP LT 130 MM HG: CPT | Mod: CPTII,S$GLB,, | Performed by: INTERNAL MEDICINE

## 2023-05-29 PROCEDURE — 1160F PR REVIEW ALL MEDS BY PRESCRIBER/CLIN PHARMACIST DOCUMENTED: ICD-10-PCS | Mod: CPTII,S$GLB,, | Performed by: INTERNAL MEDICINE

## 2023-05-29 PROCEDURE — 4010F ACE/ARB THERAPY RXD/TAKEN: CPT | Mod: CPTII,S$GLB,, | Performed by: INTERNAL MEDICINE

## 2023-05-29 PROCEDURE — 1126F PR PAIN SEVERITY QUANTIFIED, NO PAIN PRESENT: ICD-10-PCS | Mod: CPTII,S$GLB,, | Performed by: INTERNAL MEDICINE

## 2023-05-29 PROCEDURE — 1126F AMNT PAIN NOTED NONE PRSNT: CPT | Mod: CPTII,S$GLB,, | Performed by: INTERNAL MEDICINE

## 2023-05-29 PROCEDURE — 99214 OFFICE O/P EST MOD 30 MIN: CPT | Mod: S$GLB,,, | Performed by: INTERNAL MEDICINE

## 2023-05-29 PROCEDURE — 1159F PR MEDICATION LIST DOCUMENTED IN MEDICAL RECORD: ICD-10-PCS | Mod: CPTII,S$GLB,, | Performed by: INTERNAL MEDICINE

## 2023-05-29 PROCEDURE — 3288F PR FALLS RISK ASSESSMENT DOCUMENTED: ICD-10-PCS | Mod: CPTII,S$GLB,, | Performed by: INTERNAL MEDICINE

## 2023-05-29 PROCEDURE — 3288F FALL RISK ASSESSMENT DOCD: CPT | Mod: CPTII,S$GLB,, | Performed by: INTERNAL MEDICINE

## 2023-05-29 PROCEDURE — 99999 PR PBB SHADOW E&M-EST. PATIENT-LVL V: CPT | Mod: PBBFAC,,, | Performed by: INTERNAL MEDICINE

## 2023-05-29 PROCEDURE — 99499 RISK ADDL DX/OHS AUDIT: ICD-10-PCS | Mod: HCNC,S$GLB,, | Performed by: INTERNAL MEDICINE

## 2023-05-29 NOTE — PROGRESS NOTES
Subjective:       Patient ID: Ca Coats is a 72 y.o. female.    Chief Complaint: Follow-up (Frequent urination )    Patient is here for followup for chronic conditions.    Would like mammo    She still smokes.    Review of Systems   Constitutional:  Negative for activity change, appetite change and unexpected weight change.   Eyes:  Negative for visual disturbance.   Respiratory:  Negative for cough, chest tightness and shortness of breath.    Cardiovascular:  Negative for chest pain.   Gastrointestinal:  Negative for abdominal distention and abdominal pain.   Genitourinary:  Negative for difficulty urinating and urgency.        No breast lumps/masses/pain/nipple d/c in either breast     Musculoskeletal:  Positive for arthralgias and neck pain.   Skin:  Negative for rash and wound.   Psychiatric/Behavioral:  Positive for dysphoric mood. Negative for confusion.          Objective:      Physical Exam  Vitals reviewed.   Constitutional:       General: She is not in acute distress.     Appearance: Normal appearance. She is well-developed. She is obese. She is not ill-appearing, toxic-appearing or diaphoretic.      Comments: In wheelchair, diff to rise to exam table.  Here with her aide   HENT:      Head: Normocephalic and atraumatic.   Eyes:      General: No scleral icterus.     Pupils: Pupils are equal, round, and reactive to light.   Neck:      Thyroid: No thyromegaly.   Cardiovascular:      Rate and Rhythm: Normal rate and regular rhythm.      Heart sounds: Normal heart sounds. No murmur heard.    No friction rub. No gallop.   Pulmonary:      Effort: Pulmonary effort is normal. No respiratory distress.      Breath sounds: Normal breath sounds. No wheezing or rales.   Abdominal:      General: Bowel sounds are normal. There is no distension.      Palpations: Abdomen is soft. There is no mass.      Tenderness: There is no abdominal tenderness. There is no guarding or rebound.   Musculoskeletal:          General: Tenderness (shoulders, back) present. Normal range of motion.      Cervical back: Normal range of motion.      Right lower leg: Edema present.      Left lower leg: Edema present.   Lymphadenopathy:      Cervical: No cervical adenopathy.   Neurological:      General: No focal deficit present.      Mental Status: She is alert and oriented to person, place, and time.   Psychiatric:         Speech: Speech normal.         Behavior: Behavior normal.         Thought Content: Thought content normal.       Assessment:       1. Immunodeficiency due to conditions classified elsewhere    2. DVT of deep femoral vein, right    3. Morbid obesity    4. Current mild episode of major depressive disorder without prior episode    5. Encounter for screening mammogram for malignant neoplasm of breast    6. Chronic pain of left knee    7. Tobacco abuse        Plan:       Ca was seen today for follow-up.    Diagnoses and all orders for this visit:    Immunodeficiency due to conditions classified elsewhere      DVT of deep femoral vein, right  On treatment    Morbid obesity  Did not have WLS due to gastric cancer    Current mild episode of major depressive disorder without prior episode  stable    Encounter for screening mammogram for malignant neoplasm of breast  -     Mammo Digital Screening Bilat w/ [facility]; Future    Chronic pain of left knee  -     Ambulatory referral/consult to Orthopedics; Future    Tobacco abuse  -     Ambulatory referral/consult to Smoking Cessation Program; Future        Health Maintenance         Date Due Completion Date    DEXA Scan 01/02/2023 1/2/2020    Mammogram 07/27/2023 7/27/2022    COVID-19 Vaccine (7 - Mixed Product series) 09/29/2023 5/29/2023    Hemoglobin A1c (Diabetic Prevention Screening) 06/30/2024 6/30/2021    Lipid Panel 12/03/2025 12/3/2020    Colorectal Cancer Screening 11/26/2028 11/26/2018    TETANUS VACCINE 01/02/2030 1/2/2020            Follow up in about 1 year (around  5/29/2024).    Future Appointments   Date Time Provider Department Center   6/1/2023  9:00 AM Marti Barrios CTTS [facility] SMOKE [facility]   6/8/2023 11:30 AM Dwight Grayson MD Located within [facility] SLEEP [facility]   7/5/2023 12:00 PM LAB, [facility]   7/5/2023  1:00 PM Cathy Pelaez MD [facility] HEM ONC [facility]   7/5/2023  1:30 PM INJECTION, [facility] CHEMO [facility] CHEMO [facility]   7/31/2023 10:00 AM [facility]   8/1/2023 10:20 AM Savanna Hyatt NP [facility] PAINMGT [facility]   5/31/2024  9:00 AM LAB, APPOINTMENT [facility] INTMED [facility] LAB IM [facility] RAZO   6/6/2024 10:40 AM Lei Garcia MD [facility] IM [facility] IZQUIERDOW

## 2023-06-01 ENCOUNTER — CLINICAL SUPPORT (OUTPATIENT)
Dept: SMOKING CESSATION | Facility: CLINIC | Age: 73
End: 2023-06-01
Payer: COMMERCIAL

## 2023-06-01 DIAGNOSIS — F17.200 NICOTINE DEPENDENCE: Primary | ICD-10-CM

## 2023-06-01 PROCEDURE — 99999 PR PBB SHADOW E&M-EST. PATIENT-LVL I: CPT | Mod: PBBFAC,,,

## 2023-06-01 PROCEDURE — 99404 PREV MED CNSL INDIV APPRX 60: CPT | Mod: S$GLB,,,

## 2023-06-01 PROCEDURE — 99404 PR PREVENT COUNSEL,INDIV,60 MIN: ICD-10-PCS | Mod: S$GLB,,,

## 2023-06-01 PROCEDURE — 99999 PR PBB SHADOW E&M-EST. PATIENT-LVL I: ICD-10-PCS | Mod: PBBFAC,,,

## 2023-06-01 RX ORDER — IBUPROFEN 200 MG
1 TABLET ORAL DAILY
Qty: 28 PATCH | Refills: 0 | Status: SHIPPED | OUTPATIENT
Start: 2023-06-01 | End: 2023-06-15 | Stop reason: SDUPTHER

## 2023-06-05 NOTE — PROGRESS NOTES
Patient was seen in clinic today and reports smoking up to one pack of cigarettes per day. She will begin biweekly tobacco cessation sessions and a tobacco cessation medication regimen of 21mg nicotine patch. Patient states being able to quit cold turkey in the past but is finding it a little harder this time. Discussed tips and strategies to assist with quit attempt. The patient will continue with  therapy sessions and medication monitoring by CTTS. Prescribed medication management will be by physician.

## 2023-06-08 ENCOUNTER — OFFICE VISIT (OUTPATIENT)
Dept: SLEEP MEDICINE | Facility: CLINIC | Age: 73
End: 2023-06-08
Payer: MEDICARE

## 2023-06-08 VITALS
HEIGHT: 66 IN | BODY MASS INDEX: 42.75 KG/M2 | WEIGHT: 266 LBS | DIASTOLIC BLOOD PRESSURE: 81 MMHG | HEART RATE: 86 BPM | SYSTOLIC BLOOD PRESSURE: 125 MMHG

## 2023-06-08 DIAGNOSIS — G47.10 HYPERSOMNOLENCE: ICD-10-CM

## 2023-06-08 DIAGNOSIS — G47.33 OSA (OBSTRUCTIVE SLEEP APNEA): Primary | ICD-10-CM

## 2023-06-08 PROCEDURE — 3288F FALL RISK ASSESSMENT DOCD: CPT | Mod: CPTII,S$GLB,, | Performed by: INTERNAL MEDICINE

## 2023-06-08 PROCEDURE — 3074F SYST BP LT 130 MM HG: CPT | Mod: CPTII,S$GLB,, | Performed by: INTERNAL MEDICINE

## 2023-06-08 PROCEDURE — 1159F PR MEDICATION LIST DOCUMENTED IN MEDICAL RECORD: ICD-10-PCS | Mod: CPTII,S$GLB,, | Performed by: INTERNAL MEDICINE

## 2023-06-08 PROCEDURE — 3074F PR MOST RECENT SYSTOLIC BLOOD PRESSURE < 130 MM HG: ICD-10-PCS | Mod: CPTII,S$GLB,, | Performed by: INTERNAL MEDICINE

## 2023-06-08 PROCEDURE — 3288F PR FALLS RISK ASSESSMENT DOCUMENTED: ICD-10-PCS | Mod: CPTII,S$GLB,, | Performed by: INTERNAL MEDICINE

## 2023-06-08 PROCEDURE — 3079F PR MOST RECENT DIASTOLIC BLOOD PRESSURE 80-89 MM HG: ICD-10-PCS | Mod: CPTII,S$GLB,, | Performed by: INTERNAL MEDICINE

## 2023-06-08 PROCEDURE — 3008F BODY MASS INDEX DOCD: CPT | Mod: CPTII,S$GLB,, | Performed by: INTERNAL MEDICINE

## 2023-06-08 PROCEDURE — 1101F PT FALLS ASSESS-DOCD LE1/YR: CPT | Mod: CPTII,S$GLB,, | Performed by: INTERNAL MEDICINE

## 2023-06-08 PROCEDURE — 4010F ACE/ARB THERAPY RXD/TAKEN: CPT | Mod: CPTII,S$GLB,, | Performed by: INTERNAL MEDICINE

## 2023-06-08 PROCEDURE — 3079F DIAST BP 80-89 MM HG: CPT | Mod: CPTII,S$GLB,, | Performed by: INTERNAL MEDICINE

## 2023-06-08 PROCEDURE — 99203 OFFICE O/P NEW LOW 30 MIN: CPT | Mod: S$GLB,,, | Performed by: INTERNAL MEDICINE

## 2023-06-08 PROCEDURE — 99999 PR PBB SHADOW E&M-EST. PATIENT-LVL III: ICD-10-PCS | Mod: PBBFAC,,, | Performed by: INTERNAL MEDICINE

## 2023-06-08 PROCEDURE — 1159F MED LIST DOCD IN RCRD: CPT | Mod: CPTII,S$GLB,, | Performed by: INTERNAL MEDICINE

## 2023-06-08 PROCEDURE — 99999 PR PBB SHADOW E&M-EST. PATIENT-LVL III: CPT | Mod: PBBFAC,,, | Performed by: INTERNAL MEDICINE

## 2023-06-08 PROCEDURE — 4010F PR ACE/ARB THEARPY RXD/TAKEN: ICD-10-PCS | Mod: CPTII,S$GLB,, | Performed by: INTERNAL MEDICINE

## 2023-06-08 PROCEDURE — 99203 PR OFFICE/OUTPT VISIT, NEW, LEVL III, 30-44 MIN: ICD-10-PCS | Mod: S$GLB,,, | Performed by: INTERNAL MEDICINE

## 2023-06-08 PROCEDURE — 1101F PR PT FALLS ASSESS DOC 0-1 FALLS W/OUT INJ PAST YR: ICD-10-PCS | Mod: CPTII,S$GLB,, | Performed by: INTERNAL MEDICINE

## 2023-06-08 PROCEDURE — 1126F PR PAIN SEVERITY QUANTIFIED, NO PAIN PRESENT: ICD-10-PCS | Mod: CPTII,S$GLB,, | Performed by: INTERNAL MEDICINE

## 2023-06-08 PROCEDURE — 3008F PR BODY MASS INDEX (BMI) DOCUMENTED: ICD-10-PCS | Mod: CPTII,S$GLB,, | Performed by: INTERNAL MEDICINE

## 2023-06-08 PROCEDURE — 1126F AMNT PAIN NOTED NONE PRSNT: CPT | Mod: CPTII,S$GLB,, | Performed by: INTERNAL MEDICINE

## 2023-06-08 NOTE — PROGRESS NOTES
Referred by No ref. provider found     NEW PATIENT VISIT    Ca Coats  is a pleasant 72 y.o. female  with PMH significant for chronic pain, HFpEF, HTN, DVT, Gastric CA, NILSON, BMI 50, GERD,  who presents for management of LAURA.    Machine has been working well, but recently has started to make loud noises and won't turn on  Was wearing nightly until it broke, slept better with it.      PAP history   Problems    Mask FFM   Pressure    DME [employer]   Machine age Circa 2018   Download N/a       CPAP 13cwp, AHi 0.5  02/01/2013 PSG AHI 9.2 with oxygen sathish 84%.   03/29/2013 Titratrion study CPAP 13 cm      SLEEP SCHEDULE   Environment    Bed Time MN   Sleep Latency Not long   Arousals    Nocturia    Back to sleep    Wake time 7AM   Naps Dozing off   Work            Past Medical History:   Diagnosis Date    YONUG (acute kidney injury) 10/15/2021    Anemia     2018    Arthritis     BMI 60.0-69.9, adult     Cataract     Chronic diastolic congestive heart failure 1/27/2021    Depression     Dry eye syndrome     DVT of deep femoral vein, right 5/29/2023    Gastric adenocarcinoma 5/25/2021    GERD (gastroesophageal reflux disease)     Glaucoma suspect     History of gastric ulcer     History of psychiatric hospitalization     Hx of psychiatric care     Celexa, no recall of charted Effexor, Lexapro, Desyrel prescriptions    Hyperlipidemia     Hypertension     Hypothyroidism     Morbid obesity     Neuromuscular disorder     Sleep apnea     Thyroid disease      Patient Active Problem List   Diagnosis    Primary osteoarthritis of left knee    Obstructive sleep apnea    Chronic knee pain    Left knee DJD    Essential hypertension    Major depressive disorder    Class 3 severe obesity due to excess calories with serious comorbidity and body mass index (BMI) of 50.0 to 59.9 in adult    Joint pain    Gait instability    At high risk for injury related to fall    Debility    Hypothyroidism    Left knee pain    Osteopenia of  neck of left femur    Gastroesophageal reflux disease    Chronic diastolic congestive heart failure    Pure hypercholesterolemia    Tobacco abuse    Abnormal cardiovascular stress test    Gastric adenocarcinoma    Iron deficiency anemia secondary to blood loss (chronic)    Chronic pain    History of DVT (deep vein thrombosis)    Current mild episode of major depressive disorder without prior episode    Immunodeficiency due to conditions classified elsewhere    DVT of deep femoral vein, right       Current Outpatient Medications:     acetaminophen (TYLENOL) 500 MG tablet, , Disp: , Rfl:     amLODIPine (NORVASC) 10 MG tablet, TAKE 1 TABLET ONE TIME DAILY, Disp: 90 tablet, Rfl: 11    atorvastatin (LIPITOR) 20 MG tablet, TAKE 1 TABLET EVERY DAY, Disp: 90 tablet, Rfl: 11    B-complex with vitamin C (Z-BEC OR EQUIV) tablet, Take 1 tablet by mouth once daily. , Disp: , Rfl:     CALCIUM CITRATE-VITAMIN D2 ORAL, Take 1 tablet by mouth once daily. , Disp: , Rfl:     citalopram (CELEXA) 10 MG tablet, TAKE 1 TABLET EVERY DAY, Disp: 90 tablet, Rfl: 3    ELIQUIS 5 mg Tab, TAKE 1 TABLET TWICE DAILY, Disp: 180 tablet, Rfl: 2    ferrous gluconate (FERGON) 324 MG tablet, TAKE 1 TABLET (324 MG TOTAL) BY MOUTH DAILY WITH BREAKFAST., Disp: 90 tablet, Rfl: 11    furosemide (LASIX) 80 MG tablet, TAKE 1 TABLET TWICE DAILY, Disp: 180 tablet, Rfl: 3    gabapentin (NEURONTIN) 300 MG capsule, Take 2 capsules (600 mg total) by mouth 3 (three) times daily., Disp: 540 capsule, Rfl: 2    ibuprofen (ADVIL,MOTRIN) 600 MG tablet, Take 1 tablet (600 mg total) by mouth every 8 (eight) hours as needed for Pain., Disp: 10 tablet, Rfl: 0    LIDOcaine-prilocaine (EMLA) cream, Apply topically as needed (prior to port access)., Disp: 30 g, Rfl: 0    lisinopriL (PRINIVIL,ZESTRIL) 40 MG tablet, TAKE 1 TABLET ONE TIME DAILY, Disp: 90 tablet, Rfl: 11    multivitamin with minerals tablet, Take 1 tablet by mouth once daily. , Disp: , Rfl:     nicotine (NICODERM  "CQ) 21 mg/24 hr, Place 1 patch onto the skin once daily., Disp: 28 patch, Rfl: 0    oxyCODONE-acetaminophen (PERCOCET) 5-325 mg per tablet, Take 1 tablet by mouth every 8 (eight) hours as needed for Pain., Disp: 90 tablet, Rfl: 0    OZEMPIC 1 mg/dose (4 mg/3 mL), INJECT 1 MG INTO THE SKIN EVERY 7 DAYS., Disp: 9 pen, Rfl: 11    pantoprazole (PROTONIX) 40 MG tablet, TAKE 1 TABLET EVERY DAY, Disp: 90 tablet, Rfl: 11    dextran 70-hypromellose (TEARS) ophthalmic solution, Place 1 drop into the right eye every 6 (six) hours. (Patient not taking: Reported on 5/29/2023), Disp: 30 mL, Rfl: 0    lactulose (CHRONULAC) 10 gram/15 mL solution, Take 30 mLs (20 g total) by mouth 2 (two) times daily as needed (constipation). (Patient not taking: Reported on 5/29/2023), Disp: 120 mL, Rfl: 1  No current facility-administered medications for this visit.    Facility-Administered Medications Ordered in Other Visits:     0.9%  NaCl infusion, , Intravenous, Continuous, Tevin Clark MD, Stopped at 01/13/22 1055       Vitals:    06/08/23 1121   BP: 125/81   BP Location: Right arm   Patient Position: Sitting   BP Method: Small (Automatic)   Pulse: 86   Weight: 120.7 kg (266 lb)   Height: 5' 6" (1.676 m)     Physical Exam:    GEN:   Well-appearing  Psych:  Appropriate affect, demonstrates insight  SKIN:  No rash on the face or bridge of the nose      LABS:   Lab Results   Component Value Date    HGB 11.4 (L) 04/05/2023    CO2 27 04/05/2023       RECORDS REVIEWED PREVIOUSLY:        ASSESSMENT    EPWORTH SLEEPINESS SCALE 4/29/2019   Sitting and reading 3   Watching TV 3   Sitting, inactive in a public place (e.g. a theatre or a meeting) 1   As a passenger in a car for an hour without a break 1   Lying down to rest in the afternoon when circumstances permit 3   Sitting and talking to someone 0   Sitting quietly after a lunch without alcohol 2   In a car, while stopped for a few minutes in traffic 0   Total score 13     PROBLEM DESCRIPTION/ " Sx on Presentation  STATUS   LAURA    Dx in 2013, AHI 10.    PAP: has done well, FFM leaking    New   Daytime Sx   + sleepiness when inactive   ESS 13/24 on intake    PAP: improved  New   Insomnia     Trouble falling asleep:   Maintenance:         frequent due to mask leaking  Prior hypnotics:        Current hypnotics:     New   Other issues:     PLAN     -the patient's machine is broken, needs a new one  -will order auto CPAP, CPAP min=8, CPAP max =12, ramp = off    -the patient is using and benefiting from PAP therapy      RTC          The patient was given open opportunity to ask questions and/or express concerns about treatment plan.   All questions/concerns were discussed.     Two patient identifiers used prior to evaluation.

## 2023-06-14 ENCOUNTER — HOSPITAL ENCOUNTER (OUTPATIENT)
Dept: RADIOLOGY | Facility: HOSPITAL | Age: 73
Discharge: HOME | End: 2023-06-14
Attending: PHYSICIAN ASSISTANT
Payer: MEDICARE

## 2023-06-14 DIAGNOSIS — G89.29 CHRONIC PAIN OF LEFT KNEE: ICD-10-CM

## 2023-06-14 DIAGNOSIS — M25.562 CHRONIC PAIN OF LEFT KNEE: ICD-10-CM

## 2023-06-14 PROCEDURE — 73564 X-RAY EXAM KNEE 4 OR MORE: CPT | Mod: 26,LT,, | Performed by: RADIOLOGY

## 2023-06-14 PROCEDURE — 73564 X-RAY EXAM KNEE 4 OR MORE: CPT | Mod: TC,LT

## 2023-06-14 PROCEDURE — 73562 X-RAY EXAM OF KNEE 3: CPT | Mod: 26,RT,, | Performed by: RADIOLOGY

## 2023-06-14 PROCEDURE — 73562 XR KNEE ORTHO LEFT WITH FLEXION: ICD-10-PCS | Mod: 26,RT,, | Performed by: RADIOLOGY

## 2023-06-14 PROCEDURE — 73564 XR KNEE ORTHO LEFT WITH FLEXION: ICD-10-PCS | Mod: 26,LT,, | Performed by: RADIOLOGY

## 2023-06-15 ENCOUNTER — CLINICAL SUPPORT (OUTPATIENT)
Dept: SMOKING CESSATION | Facility: CLINIC | Age: 73
End: 2023-06-15
Payer: COMMERCIAL

## 2023-06-15 DIAGNOSIS — F17.200 NICOTINE DEPENDENCE: ICD-10-CM

## 2023-06-15 PROCEDURE — 99999 PR PBB SHADOW E&M-EST. PATIENT-LVL I: ICD-10-PCS | Mod: PBBFAC,,,

## 2023-06-15 PROCEDURE — 99999 PR PBB SHADOW E&M-EST. PATIENT-LVL I: CPT | Mod: PBBFAC,,,

## 2023-06-15 PROCEDURE — 99402 PR PREVENT COUNSEL,INDIV,30 MIN: ICD-10-PCS | Mod: S$GLB,,,

## 2023-06-15 PROCEDURE — 99402 PREV MED CNSL INDIV APPRX 30: CPT | Mod: S$GLB,,,

## 2023-06-15 RX ORDER — IBUPROFEN 200 MG
1 TABLET ORAL DAILY
Qty: 28 PATCH | Refills: 0 | Status: SHIPPED | OUTPATIENT
Start: 2023-06-15 | End: 2023-06-20 | Stop reason: SDUPTHER

## 2023-06-15 NOTE — PROGRESS NOTES
Individual Follow-Up Form    6/15/2023    Quit Date:     Clinical Status of Patient: Outpatient    Continuing Medication: yes  Patches    Other Medications: none     Target Symptoms: Withdrawal and medication side effects. The following were  rated moderate (3) to severe (4) on TCRS:  Moderate (3): none  Severe (4): none    Comments: Spoke with patient for a length of time over the telephone and she reports smoking 8-9 cigarettes per day. She remains on a tobacco cessation medication regimen of 21mg nicotine patch. Refill sent to pharmacy. Patient reports that she continues to smoke in her home. Encouraged patient to only smoke outside. She reports being scheduled for an appointment on 6/30 to discuss surgery. Patient states that she would really like to be quit by that date because she is ready to walk again. Discussed tips and strategies to assist with quit attempt. Reviewed strategies, cues, and triggers. Introduced the negative impact of tobacco on health, the health advantages of discontinuing the use of tobacco, time line improved health changes after a quit, withdrawal issues to expect from nicotine and habit, and ways to achieve the goal of a quit. The patient will continue with  therapy sessions and medication monitoring by CTTS. Prescribed medication management will be by physician.     Diagnosis: F17.200    Next Visit: 1 week

## 2023-06-20 DIAGNOSIS — F17.200 NICOTINE DEPENDENCE: ICD-10-CM

## 2023-06-20 RX ORDER — IBUPROFEN 200 MG
1 TABLET ORAL DAILY
Qty: 28 PATCH | Refills: 0 | Status: SHIPPED | OUTPATIENT
Start: 2023-06-20 | End: 2023-07-27 | Stop reason: SDUPTHER

## 2023-06-20 NOTE — PROGRESS NOTES
Patient states that she would like to change the location of the pharmacy to pick her patches to [facility]. She also rescheduled her appointment to next week, 6/29 @ 11am.

## 2023-06-21 ENCOUNTER — TELEPHONE (OUTPATIENT)
Dept: HEMATOLOGY/ONCOLOGY | Facility: CLINIC | Age: 73
End: 2023-06-21
Payer: MEDICARE

## 2023-06-21 ENCOUNTER — PATIENT OUTREACH (OUTPATIENT)
Dept: ADMINISTRATIVE | Facility: HOSPITAL | Age: 73
End: 2023-06-21
Payer: MEDICARE

## 2023-06-21 NOTE — TELEPHONE ENCOUNTER
----- Message from Donis Mendoza sent at 6/21/2023 11:36 AM CDT -----  Name of Who is Calling:LU RENAE [[id number]]           What is the request in detail:pt stated that she would like to speak with the office directly in regards to her questions/concerns.Please contact to further discuss and advise.            Can the clinic reply by [facility]:[phone]           What [facility] to Call Back [facility] not [facility]:[phone]

## 2023-06-21 NOTE — TELEPHONE ENCOUNTER
Returned call and spoke Ms Coats. Ms Barallon calling to request her appointments scheduled on 7/5 afternoon be rescheduled to a morning time. Ms Coats sitter is only available in the morning to bring her to appointments. Request has been forward to the

## 2023-06-21 NOTE — PROGRESS NOTES
Health Maintenance Due   Topic Date Due    DEXA Scan  01/02/2023    Mammogram  07/27/2023     Triggered LINKS and reconciled immunizations. Updated Care Everywhere. Checked for outside lab results in [facility] and [facility]; no results found. Pt has mammogram appt scheduled for 7/31/2023. [facility]

## 2023-06-22 ENCOUNTER — PES CALL (OUTPATIENT)
Dept: ADMINISTRATIVE | Facility: CLINIC | Age: 73
End: 2023-06-22
Payer: MEDICARE

## 2023-06-27 DIAGNOSIS — M25.562 CHRONIC PAIN OF LEFT KNEE: Primary | ICD-10-CM

## 2023-06-27 DIAGNOSIS — C16.9 GASTRIC ADENOCARCINOMA: ICD-10-CM

## 2023-06-27 DIAGNOSIS — G89.4 CHRONIC PAIN DISORDER: ICD-10-CM

## 2023-06-27 DIAGNOSIS — M17.12 PRIMARY OSTEOARTHRITIS OF LEFT KNEE: ICD-10-CM

## 2023-06-27 DIAGNOSIS — G89.29 CHRONIC PAIN OF LEFT KNEE: Primary | ICD-10-CM

## 2023-06-27 RX ORDER — OXYCODONE AND ACETAMINOPHEN 5; 325 MG/1; MG/1
1 TABLET ORAL EVERY 8 HOURS PRN
Qty: 90 TABLET | Refills: 0 | Status: SHIPPED | OUTPATIENT
Start: 2023-06-27 | End: 2023-07-25 | Stop reason: SDUPTHER

## 2023-06-27 NOTE — TELEPHONE ENCOUNTER
----- Message from Franny Patel sent at 6/27/2023  9:19 AM CDT -----      Can the clinic reply in [facility]:Y        Please refill the medication(s) listed below. Please call the patient when the prescription(s) is ready for  at this phone number         Medication #1 oxyCODONE-acetaminophen (PERCOCET) 5-325 mg per tablet    Medication #2       Preferred Pharmacy: [facility]

## 2023-06-27 NOTE — TELEPHONE ENCOUNTER
Patient requesting refill on (PERCOCET) 5-325 mg   Last office visit 5/1/23   shows last refill on 5/25/23  Patient does have a pain contract on file with [facility] department  Patient last UDS 8/16/23 was consistent with current therapy     CODEINE  Not Detected  Not Detected    MORPHINE  Not Detected  Not Detected    6-ACETYLMORPHINE  Not Detected  Not Detected    OXYCODONE  Present  Present    NOROYXCODONE  Present  Present    OXYMORPHONE  Present  Not Detected    NOROXYMORPHONE  Present  Not Detected    HYDROCODONE  Not Detected  Not Detected    NORHYDROCODONE  Not Detected  Not Detected    HYDROMORPHONE  Not Detected  Not Detected    BUPRENORPHINE  Not Detected  Not Detected    NORUBPRENORPHINE  Not Detected  Not Detected    FENTANYL  Not Detected  Not Detected    NORFENTANYL  Not Detected  Not Detected    MEPERIDINE METABOLITE  Not Detected  Not Detected    TAPENTADOL  Not Detected  Not Detected    TAPENTADOL-O-SULF  Not Detected  Not Detected    METHADONE  Not Detected  Not Detected    TRAMADOL  Not Detected  Not Detected    AMPHETAMINE  Not Detected  Not Detected    METHAMPHETAMINE  Not Detected  Not Detected    MDMA- ECSTASY  Not Detected  Not Detected    MDA  Not Detected  Not Detected    MDEA- Genoveva  Not Detected  Not Detected    METHYLPHENIDATE  Not Detected  Not Detected    PHENTERMINE  Not Detected  Not Detected    BENZOYLECGONINE  Not Detected  Not Detected    ALPRAZOLAM  Not Detected  Not Detected    ALPHA-OH-ALPRAZOLAM  Not Detected  Not Detected    CLONAZEPAM  Not Detected  Not Detected    7-AMINOCLONAZEPAM  Not Detected  Not Detected    DIAZEPAM  Not Detected  Not Detected    NORDIAZEPAM  Not Detected  Not Detected    OXAZEPAM  Not Detected  Not Detected    TEMAZEPAM  Not Detected  Not Detected    Lorazepam  Not Detected  Not Detected    MIDAZOLAM  Not Detected  Not Detected    ZOLPIDEM  Not Detected  Not Detected    BARBITURATES  Not Detected  Not Detected    Creatinine,  Urine 20.0 - 400.0 mg/dL 205.6  102.0    ETHYL GLUCURONIDE  Present  Not Detected    MARIJUANA METABOLITE  Not Detected  Not Detected    PCP  Not Detected  Not Detected    CARISOPRODOL  Not Detected  Not Detected CM    Comment: The carisoprodol immunoassay has cross-reactivity to   carisoprodol and meprobamate.    Naloxone  Not Detected     Gabapentin  Present     Pregabalin  Not Detected     Alpha-OH-Midazolam  Not Detected     Zolpidem Metabolite  Not Detected     PAIN MANAGEMENT DRUG PANEL  S

## 2023-06-29 ENCOUNTER — CLINICAL SUPPORT (OUTPATIENT)
Dept: SMOKING CESSATION | Facility: CLINIC | Age: 73
End: 2023-06-29
Payer: COMMERCIAL

## 2023-06-29 ENCOUNTER — TELEPHONE (OUTPATIENT)
Dept: SMOKING CESSATION | Facility: CLINIC | Age: 73
End: 2023-06-29
Payer: MEDICARE

## 2023-06-29 DIAGNOSIS — F17.200 NICOTINE DEPENDENCE: Primary | ICD-10-CM

## 2023-06-29 PROCEDURE — 99402 PR PREVENT COUNSEL,INDIV,30 MIN: ICD-10-PCS | Mod: S$GLB,,,

## 2023-06-29 PROCEDURE — 99999 PR PBB SHADOW E&M-EST. PATIENT-LVL I: CPT | Mod: PBBFAC,,,

## 2023-06-29 PROCEDURE — 99402 PREV MED CNSL INDIV APPRX 30: CPT | Mod: S$GLB,,,

## 2023-06-29 PROCEDURE — 99999 PR PBB SHADOW E&M-EST. PATIENT-LVL I: ICD-10-PCS | Mod: PBBFAC,,,

## 2023-06-29 NOTE — TELEPHONE ENCOUNTER
Called patient at scheduled appointment time. The patient did not answer and no voice message was able to be left for patient. CTTS will try patient at a later time.

## 2023-06-29 NOTE — PROGRESS NOTES
Individual Follow-Up Form    6/29/2023    Quit Date:     Clinical Status of Patient: Outpatient    Continuing Medication: yes  Patches    Other Medications: none     Target Symptoms: Withdrawal and medication side effects. The following were  rated moderate (3) to severe (4) on TCRS:  Moderate (3): none  Severe (4): none    Comments: Spoke with patient for a length of time over the telephone and she reports smoking 6-7 cigarettes per day. She remains on a tobacco cessation medication regimen of 21mg nicotine patch. No refills needed at this time and no abnormal behaviors or mental changes reported. Patient states that she has an appointment tomorrow to figure out when she will be getting surgery. Patient states that she is certain that she will be required to quit to receive surgery and be nicotine free. Encouraged patient to stay on the patch until she is able to quit. Reviewed strategies, controlling environment, cues, triggers, new goals set. Introduced high risk situations with preparation interventions, caffeine similarities with withdrawal issues of habit and nicotine, alcohol, understanding urges, cravings, stress and relaxation. The patient will continue with  therapy sessions and medication monitoring by CTTS. Prescribed medication management will be by physician.     Diagnosis: F17.200    Next Visit: 1 week

## 2023-06-30 ENCOUNTER — OFFICE VISIT (OUTPATIENT)
Dept: ORTHOPEDICS | Facility: CLINIC | Age: 73
End: 2023-06-30
Payer: MEDICARE

## 2023-06-30 VITALS — HEIGHT: 65 IN | WEIGHT: 278.69 LBS | BODY MASS INDEX: 46.43 KG/M2

## 2023-06-30 DIAGNOSIS — E66.01 MORBID OBESITY WITH BMI OF 45.0-49.9, ADULT: ICD-10-CM

## 2023-06-30 DIAGNOSIS — M17.12 PRIMARY OSTEOARTHRITIS OF LEFT KNEE: Primary | ICD-10-CM

## 2023-06-30 PROCEDURE — 1160F RVW MEDS BY RX/DR IN RCRD: CPT | Mod: CPTII,S$GLB,, | Performed by: ORTHOPAEDIC SURGERY

## 2023-06-30 PROCEDURE — 1125F AMNT PAIN NOTED PAIN PRSNT: CPT | Mod: CPTII,S$GLB,, | Performed by: ORTHOPAEDIC SURGERY

## 2023-06-30 PROCEDURE — 99204 OFFICE O/P NEW MOD 45 MIN: CPT | Mod: S$GLB,,, | Performed by: ORTHOPAEDIC SURGERY

## 2023-06-30 PROCEDURE — 1125F PR PAIN SEVERITY QUANTIFIED, PAIN PRESENT: ICD-10-PCS | Mod: CPTII,S$GLB,, | Performed by: ORTHOPAEDIC SURGERY

## 2023-06-30 PROCEDURE — 1160F PR REVIEW ALL MEDS BY PRESCRIBER/CLIN PHARMACIST DOCUMENTED: ICD-10-PCS | Mod: CPTII,S$GLB,, | Performed by: ORTHOPAEDIC SURGERY

## 2023-06-30 PROCEDURE — 99999 PR PBB SHADOW E&M-EST. PATIENT-LVL III: ICD-10-PCS | Mod: PBBFAC,,, | Performed by: ORTHOPAEDIC SURGERY

## 2023-06-30 PROCEDURE — 3288F PR FALLS RISK ASSESSMENT DOCUMENTED: ICD-10-PCS | Mod: CPTII,S$GLB,, | Performed by: ORTHOPAEDIC SURGERY

## 2023-06-30 PROCEDURE — 1159F PR MEDICATION LIST DOCUMENTED IN MEDICAL RECORD: ICD-10-PCS | Mod: CPTII,S$GLB,, | Performed by: ORTHOPAEDIC SURGERY

## 2023-06-30 PROCEDURE — 3288F FALL RISK ASSESSMENT DOCD: CPT | Mod: CPTII,S$GLB,, | Performed by: ORTHOPAEDIC SURGERY

## 2023-06-30 PROCEDURE — 1159F MED LIST DOCD IN RCRD: CPT | Mod: CPTII,S$GLB,, | Performed by: ORTHOPAEDIC SURGERY

## 2023-06-30 PROCEDURE — 99204 PR OFFICE/OUTPT VISIT, NEW, LEVL IV, 45-59 MIN: ICD-10-PCS | Mod: S$GLB,,, | Performed by: ORTHOPAEDIC SURGERY

## 2023-06-30 PROCEDURE — 3008F BODY MASS INDEX DOCD: CPT | Mod: CPTII,S$GLB,, | Performed by: ORTHOPAEDIC SURGERY

## 2023-06-30 PROCEDURE — 4010F ACE/ARB THERAPY RXD/TAKEN: CPT | Mod: CPTII,S$GLB,, | Performed by: ORTHOPAEDIC SURGERY

## 2023-06-30 PROCEDURE — 99999 PR PBB SHADOW E&M-EST. PATIENT-LVL III: CPT | Mod: PBBFAC,,, | Performed by: ORTHOPAEDIC SURGERY

## 2023-06-30 PROCEDURE — 1101F PT FALLS ASSESS-DOCD LE1/YR: CPT | Mod: CPTII,S$GLB,, | Performed by: ORTHOPAEDIC SURGERY

## 2023-06-30 PROCEDURE — 3008F PR BODY MASS INDEX (BMI) DOCUMENTED: ICD-10-PCS | Mod: CPTII,S$GLB,, | Performed by: ORTHOPAEDIC SURGERY

## 2023-06-30 PROCEDURE — 4010F PR ACE/ARB THEARPY RXD/TAKEN: ICD-10-PCS | Mod: CPTII,S$GLB,, | Performed by: ORTHOPAEDIC SURGERY

## 2023-06-30 PROCEDURE — 1101F PR PT FALLS ASSESS DOC 0-1 FALLS W/OUT INJ PAST YR: ICD-10-PCS | Mod: CPTII,S$GLB,, | Performed by: ORTHOPAEDIC SURGERY

## 2023-06-30 NOTE — PROGRESS NOTES
Subjective:      Patient ID: Ca Coats is a 72 y.o. female.    Chief Complaint: Pain of the Left Knee      HPI: Ca Coats is a 72 y.o. female here with a 6 years history of left knee pain. The patient is a  on disability. There was not a history of trauma.  The pain is severe The pain is located in the medial aspect of the knee. There is is not radiation.  There is not catching or locking.   The pain is described as dull. The patient has not had prior surgery. It is aggravated by sitting, standing, and walking.  It is alleviated by rest. There is not numbness or tingling of the lower extremity.  There is not back pain.  She  has tried multiple steroid injections and RFA. They have helped.  She does have difficulty getting in or out of a car, getting dressed, or going up or down stairs.  The patient does use an assistive device.    Past Medical History:   Diagnosis Date    YOUNG (acute kidney injury) 10/15/2021    Anemia     2018    Arthritis     BMI 60.0-69.9, adult     Cataract     Chronic diastolic congestive heart failure 1/27/2021    Depression     Dry eye syndrome     DVT of deep femoral vein, right 5/29/2023    Gastric adenocarcinoma 5/25/2021    GERD (gastroesophageal reflux disease)     Glaucoma suspect     History of gastric ulcer     History of psychiatric hospitalization     Hx of psychiatric care     Celexa, no recall of charted Effexor, Lexapro, Desyrel prescriptions    Hyperlipidemia     Hypertension     Hypothyroidism     Morbid obesity     Neuromuscular disorder     Sleep apnea     Thyroid disease        Current Outpatient Medications on File Prior to Visit   Medication Sig Dispense Refill    acetaminophen (TYLENOL) 500 MG tablet       amLODIPine (NORVASC) 10 MG tablet TAKE 1 TABLET ONE TIME DAILY 90 tablet 11    atorvastatin (LIPITOR) 20 MG tablet TAKE 1 TABLET EVERY DAY 90 tablet 11    B-complex with vitamin C (Z-BEC OR EQUIV) tablet Take 1 tablet by mouth once daily.        CALCIUM CITRATE-VITAMIN D2 ORAL Take 1 tablet by mouth once daily.       citalopram (CELEXA) 10 MG tablet TAKE 1 TABLET EVERY DAY 90 tablet 3    dextran 70-hypromellose (TEARS) ophthalmic solution Place 1 drop into the right eye every 6 (six) hours. 30 mL 0    ELIQUIS 5 mg Tab TAKE 1 TABLET TWICE DAILY 180 tablet 2    ferrous gluconate (FERGON) 324 MG tablet TAKE 1 TABLET (324 MG TOTAL) BY MOUTH DAILY WITH BREAKFAST. 90 tablet 11    furosemide (LASIX) 80 MG tablet TAKE 1 TABLET TWICE DAILY 180 tablet 3    gabapentin (NEURONTIN) 300 MG capsule Take 2 capsules (600 mg total) by mouth 3 (three) times daily. 540 capsule 2    ibuprofen (ADVIL,MOTRIN) 600 MG tablet Take 1 tablet (600 mg total) by mouth every 8 (eight) hours as needed for Pain. 10 tablet 0    lactulose (CHRONULAC) 10 gram/15 mL solution Take 30 mLs (20 g total) by mouth 2 (two) times daily as needed (constipation). 120 mL 1    LIDOcaine-prilocaine (EMLA) cream Apply topically as needed (prior to port access). 30 g 0    lisinopriL (PRINIVIL,ZESTRIL) 40 MG tablet TAKE 1 TABLET ONE TIME DAILY 90 tablet 11    multivitamin with minerals tablet Take 1 tablet by mouth once daily.       nicotine (NICODERM CQ) 21 mg/24 hr Place 1 patch onto the skin once daily. 28 patch 0    oxyCODONE-acetaminophen (PERCOCET) 5-325 mg per tablet Take 1 tablet by mouth every 8 (eight) hours as needed for Pain. 90 tablet 0    OZEMPIC 1 mg/dose (4 mg/3 mL) INJECT 1 MG INTO THE SKIN EVERY 7 DAYS. 9 pen 11    pantoprazole (PROTONIX) 40 MG tablet TAKE 1 TABLET EVERY DAY 90 tablet 11     Current Facility-Administered Medications on File Prior to Visit   Medication Dose Route Frequency Provider Last Rate Last Admin    0.9%  NaCl infusion   Intravenous Continuous Tevin Clark MD   Stopped at 01/13/22 1055       Past Surgical History:   Procedure Laterality Date    APPENDECTOMY      CARDIAC SURGERY      CATARACT EXTRACTION W/  INTRAOCULAR LENS IMPLANT Bilateral     MFIOL OU     CORONARY ANGIOGRAPHY Bilateral 2/24/2021    Procedure: ANGIOGRAM, CORONARY ARTERY;  Surgeon: Cresencio Ridley MD;  Location: [facility] CATH LAB;  Service: Cardiology;  Laterality: Bilateral;  Covid test needed - ok per Danay MORENOU. Pt. w/o transportation or family    ENDOSCOPIC ULTRASOUND OF UPPER GASTROINTESTINAL TRACT N/A 3/17/2021    Procedure: ULTRASOUND, UPPER GI TRACT, ENDOSCOPIC;  Surgeon: Gerald Anaya MD;  Location: [facility] (2ND FLR);  Service: Endoscopy;  Laterality: N/A;  Pt unable to get a ride for swab. Will do rapid test at 8:30 - ttr    ENDOSCOPIC ULTRASOUND OF UPPER GASTROINTESTINAL TRACT N/A 1/13/2022    Procedure: ULTRASOUND, UPPER GI TRACT, ENDOSCOPIC;  Surgeon: Kevin Carballo MD;  Location: [facility] (2ND FLR);  Service: Endoscopy;  Laterality: N/A;  blood thinner approval received, see telephone encounter 1/7/22-BB  rapid  instructions given verbally and sent to address on file in pt's chart-BB    ENDOSCOPIC ULTRASOUND OF UPPER GASTROINTESTINAL TRACT N/A 10/11/2022    Procedure: ULTRASOUND, UPPER GI TRACT, ENDOSCOPIC;  Surgeon: Dequan Ridley MD;  Location: [facility] (2ND FLR);  Service: Endoscopy;  Laterality: N/A;    ESOPHAGOGASTRODUODENOSCOPY N/A 2/5/2021    Procedure: EGD (ESOPHAGOGASTRODUODENOSCOPY);  Surgeon: Matthew Hernadez MD;  Location: [facility] (2ND FLR);  Service: Endoscopy;  Laterality: N/A;  BMI-58    Wt: 361#     COVID test at [facility] on 2/2-GT    ESOPHAGOGASTRODUODENOSCOPY N/A 3/17/2021    Procedure: EGD (ESOPHAGOGASTRODUODENOSCOPY);  Surgeon: Gerald Anaya MD;  Location: [facility] (2ND FLR);  Service: Endoscopy;  Laterality: N/A;    ESOPHAGOGASTRODUODENOSCOPY N/A 5/25/2021    Procedure: EGD (ESOPHAGOGASTRODUODENOSCOPY);  Surgeon: Kevin Carballo MD;  Location: [facility] ENDO (2ND FLR);  Service: Endoscopy;  Laterality: N/A;  ESD 2 hours  Full COVID vaccination on file. ttr    ESOPHAGOGASTRODUODENOSCOPY N/A 1/13/2022    Procedure: EGD (ESOPHAGOGASTRODUODENOSCOPY);  Surgeon:  Kevin Carballo MD;  Location: [facility] ENDO (2ND FLR);  Service: Endoscopy;  Laterality: N/A;  blood thinner approval, see telephone encounter 1/7/22-BB  rapid  instructions given verbally and sent to address on file in pt's chart-BB    ESOPHAGOGASTRODUODENOSCOPY N/A 10/11/2022    Procedure: EGD (ESOPHAGOGASTRODUODENOSCOPY);  Surgeon: Dequan Ridley MD;  Location: [facility] ENDO (2ND FLR);  Service: Endoscopy;  Laterality: N/A;  She is do for EGD/EUS now. Personal history of gastric cancer. aC Coats #[id number].   Thanks,   Kevin Fuentes MD    Pt is fully vaccinated-DS  9/14/22-Approval to hold Davian rec'd from Dr. Pelaez (see telephone encounter 9/14/22)-DS  9/14/22-Ins    EYE SURGERY      INJECTION OF ANESTHETIC AGENT AROUND NERVE Left 7/10/2018    Procedure: BLOCK, NERVE;  Surgeon: Samantha Vidal MD;  Location: [facility] [place] PAIN MGT;  Service: Pain Management;  Laterality: Left;  Genicular     INJECTION OF ANESTHETIC AGENT AROUND NERVE Left 7/19/2019    Procedure: Block, Nerve NERVE BLOCK GENICULAR WITH PHENOL 6%;  Surgeon: Samantha Vidal MD;  Location: [facility] [place] operated [facility] PAIN MGT;  Service: Pain Management;  Laterality: Left;  NEEDS CONSENT    INSERTION OF TUNNELED CENTRAL VENOUS CATHETER (CVC) WITH SUBCUTANEOUS PORT N/A 7/9/2021    Procedure: INSERTION, PORT-A-CATH;  Surgeon: Mario Paz MD;  Location: [facility] [place] CATH LAB;  Service: Radiology;  Laterality: N/A;  PORT PLACEMENT    RADIOFREQUENCY ABLATION Left 6/19/2020    Procedure: RADIOFREQUENCY ABLATION LEFT GENICULAR;  Surgeon: Tevin Clark MD;  Location: [facility] [place] PAIN MGT;  Service: Pain Management;  Laterality: Left;  Left Genicular RFA    RADIOFREQUENCY ABLATION Left 1/22/2021    Procedure: RADIOFREQUENCY ABLATION LEFT GENICULAR;  Surgeon: Tevin Clark MD;  Location: [facility] [place] PAIN MGT;  Service: Pain Management;  Laterality: Left;    RADIOFREQUENCY ABLATION Left 7/30/2021    Procedure: RADIOFREQUENCY ABLATION, GENICULAR COOLED NEED CONSENT;  Surgeon: Tevin Clark  MD;  Location: [facility] PAIN MGT;  Service: Pain Management;  Laterality: Left;    RADIOFREQUENCY ABLATION Left 4/22/2022    Procedure: RADIOFREQUENCY ABLATION, GENICULAR COOLED , LEFT;  Surgeon: Tevin Clark MD;  Location: [facility] PAIN MGT;  Service: Pain Management;  Laterality: Left;    RADIOFREQUENCY ABLATION Left 12/23/2022    Procedure: RADIOFREQUENCY ABLATION LEFT GENICULAR COOLED CLEARED TO HOLD ELIQUIS 3 DAYS  NEEDS CONSENT;  Surgeon: Tevin Clark MD;  Location: [facility] PAIN MGT;  Service: Pain Management;  Laterality: Left;    TONSILLECTOMY         Family History   Problem Relation Age of Onset    No Known Problems Mother     No Known Problems Father     Colon cancer Neg Hx     Esophageal cancer Neg Hx        Social History     Socioeconomic History    Marital status:     Number of children: 2   Occupational History     Comment: retired  and cook   Tobacco Use    Smoking status: Some Days     Years: 50.00     Types: Cigarettes    Smokeless tobacco: Never    Tobacco comments:     currently smoking <5 cigarettes most days   Substance and Sexual Activity    Alcohol use: Yes     Comment: raely    Drug use: No    Sexual activity: Not Currently     Partners: Male   Social History Narrative    2 children (dtr in area, son in [place]) and she has 3 grandkids (in [place]),    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         Past Medical History:   Diagnosis Date    YOUNG (acute kidney injury) 10/15/2021    Anemia     2018    Arthritis     BMI 60.0-69.9, adult     Cataract     Chronic diastolic congestive heart failure 1/27/2021    Depression     Dry eye syndrome     DVT of deep femoral vein, right 5/29/2023    Gastric adenocarcinoma 5/25/2021    GERD (gastroesophageal reflux disease)     Glaucoma suspect     History of gastric ulcer     History of psychiatric hospitalization     Hx of psychiatric care     Celexa, no recall of charted Effexor, Lexapro, Desyrel prescriptions     Hyperlipidemia     Hypertension     Hypothyroidism     Morbid obesity     Neuromuscular disorder     Sleep apnea     Thyroid disease      Past Surgical History:   Procedure Laterality Date    APPENDECTOMY      CARDIAC SURGERY      CATARACT EXTRACTION W/  INTRAOCULAR LENS IMPLANT Bilateral     MFIOL OU    CORONARY ANGIOGRAPHY Bilateral 2/24/2021    Procedure: ANGIOGRAM, CORONARY ARTERY;  Surgeon: Cresencio Ridley MD;  Location: [facility] CATH LAB;  Service: Cardiology;  Laterality: Bilateral;  Covid test needed - ok per Danay SSCU. Pt. w/o transportation or family    ENDOSCOPIC ULTRASOUND OF UPPER GASTROINTESTINAL TRACT N/A 3/17/2021    Procedure: ULTRASOUND, UPPER GI TRACT, ENDOSCOPIC;  Surgeon: Gerald Anaya MD;  [facility]  Service: Endoscopy;  Laterality: N/A;  Pt unable to get a ride for swab. Will do rapid test at 8:30 - ttr    ENDOSCOPIC ULTRASOUND OF UPPER GASTROINTESTINAL TRACT N/A 1/13/2022    Procedure: ULTRASOUND, UPPER GI TRACT, ENDOSCOPIC;  Surgeon: Kevin Carballo MD;  Location: [facility] ([address] [place] [address]);  Service: Endoscopy;  Laterality: N/A;  blood thinner approval received, see telephone encounter 1/7/22-BB  rapid  instructions given verbally and sent to address on file in pt's chart-BB    ENDOSCOPIC ULTRASOUND OF UPPER GASTROINTESTINAL TRACT N/A 10/11/2022    Procedure: ULTRASOUND, UPPER GI TRACT, ENDOSCOPIC;  Surgeon: Dequan Ridley MD;  [facility]  Service: Endoscopy;  Laterality: N/A;    ESOPHAGOGASTRODUODENOSCOPY N/A 2/5/2021    Procedure: EGD (ESOPHAGOGASTRODUODENOSCOPY);  Surgeon: Matthew Hernadez MD;  [facility]  Service: Endoscopy;  Laterality: N/A;  BMI-58    Wt: 361#     COVID test at [facility] on 2/2-GT    ESOPHAGOGASTRODUODENOSCOPY N/A 3/17/2021    Procedure: EGD (ESOPHAGOGASTRODUODENOSCOPY);  Surgeon: Gerald Anaya MD;  [facility]  Service: Endoscopy;  Laterality: N/A;    ESOPHAGOGASTRODUODENOSCOPY N/A  5/25/2021    Procedure: EGD (ESOPHAGOGASTRODUODENOSCOPY);  Surgeon: Kevin Carballo MD;  Location: [facility] ENDO (2ND FLR);  Service: Endoscopy;  Laterality: N/A;  ESD 2 hours  Full COVID vaccination on file. ttr    ESOPHAGOGASTRODUODENOSCOPY N/A 1/13/2022    Procedure: EGD (ESOPHAGOGASTRODUODENOSCOPY);  Surgeon: Kevin Carballo MD;  Location: [facility] ENDO (2ND FLR);  Service: Endoscopy;  Laterality: N/A;  blood thinner approval, see telephone encounter 1/7/22-BB  rapid  instructions given verbally and sent to address on file in pt's chart-BB    ESOPHAGOGASTRODUODENOSCOPY N/A 10/11/2022    Procedure: EGD (ESOPHAGOGASTRODUODENOSCOPY);  Surgeon: Dequan Ridley MD;  Location: [facility] ENDO (2ND FLR);  Service: Endoscopy;  Laterality: N/A;  She is do for EGD/EUS now. Personal history of gastric cancer. Ca Coats #[id number].   Thanks,   Kevin Fuentes MD    Pt is fully vaccinated-DS  9/14/22-Approval to hold Eliquis rec'd from Dr. Pelaez (see telephone encounter 9/14/22)-DS  9/14/22-Ins    EYE SURGERY      INJECTION OF ANESTHETIC AGENT AROUND NERVE Left 7/10/2018    Procedure: BLOCK, NERVE;  Surgeon: Samantha Vidal MD;  Location: [facility] PAIN T;  Service: Pain Management;  Laterality: Left;  Genicular     INJECTION OF ANESTHETIC AGENT AROUND NERVE Left 7/19/2019    Procedure: Block, Nerve NERVE BLOCK GENICULAR WITH PHENOL 6%;  Surgeon: Samantha Vidal MD;  [facility];  Service: Pain Management;  Laterality: Left;  NEEDS CONSENT    INSERTION OF TUNNELED CENTRAL VENOUS CATHETER (CVC) WITH SUBCUTANEOUS PORT N/A 7/9/2021    Procedure: INSERTION, PORT-A-CATH;  Surgeon: Mario Paz MD;  Location: [facility] CATH LAB;  Service: Radiology;  Laterality: N/A;  PORT PLACEMENT    RADIOFREQUENCY ABLATION Left 6/19/2020    Procedure: RADIOFREQUENCY ABLATION LEFT GENICULAR;  Surgeon: Tevin Clark MD;  Location: [facility] PAIN MGT;  Service: Pain Management;  Laterality: Left;  Left Genicular RFA    RADIOFREQUENCY ABLATION  Left 1/22/2021    Procedure: RADIOFREQUENCY ABLATION LEFT GENICULAR;  Surgeon: Tevin Clark MD;  Location: [facility] PAIN MGT;  Service: Pain Management;  Laterality: Left;    RADIOFREQUENCY ABLATION Left 7/30/2021    Procedure: RADIOFREQUENCY ABLATION, GENICULAR COOLED NEED CONSENT;  Surgeon: Tevin Clark MD;  Location: [facility] PAIN MGT;  Service: Pain Management;  Laterality: Left;    RADIOFREQUENCY ABLATION Left 4/22/2022    Procedure: RADIOFREQUENCY ABLATION, GENICULAR COOLED , LEFT;  Surgeon: Tevin Clark MD;  Location: [facility] PAIN MGT;  Service: Pain Management;  Laterality: Left;    RADIOFREQUENCY ABLATION Left 12/23/2022    Procedure: RADIOFREQUENCY ABLATION LEFT GENICULAR COOLED CLEARED TO HOLD ELIQUIS 3 DAYS  NEEDS CONSENT;  Surgeon: Tevin Clark MD;  Location: [facility] PAIN MGT;  Service: Pain Management;  Laterality: Left;    TONSILLECTOMY       Social History     Socioeconomic History    Marital status:     Number of children: 2   Occupational History     Comment: retired  and cook   Tobacco Use    Smoking status: Some Days     Years: 50.00     Types: Cigarettes    Smokeless tobacco: Never    Tobacco comments:     currently smoking <5 cigarettes most days   Substance and Sexual Activity    Alcohol use: Yes     Comment: raely    Drug use: No    Sexual activity: Not Currently     Partners: Male   Social History Narrative    2 children (dtr in area, son in [place]) and she has 3 grandkids (in [place]),    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 , Disp: , Rfl:     CALCIUM CITRATE-VITAMIN D2 ORAL,  Take 1 tablet by mouth once daily. , Disp: , Rfl:     citalopram (CELEXA) 10 MG tablet, TAKE 1 TABLET EVERY DAY, Disp: 90 tablet, Rfl: 3    dextran 70-hypromellose (TEARS) ophthalmic solution, Place 1 drop into the right eye every 6 (six) hours., Disp: 30 mL, Rfl: 0    ELIQUIS 5 mg Tab, TAKE 1 TABLET TWICE DAILY, Disp: 180 tablet, Rfl: 2    ferrous gluconate (FERGON) 324 MG tablet, TAKE 1 TABLET (324 MG TOTAL) BY MOUTH DAILY WITH BREAKFAST., Disp: 90 tablet, Rfl: 11    furosemide (LASIX) 80 MG tablet, TAKE 1 TABLET TWICE DAILY, Disp: 180 tablet, Rfl: 3    gabapentin (NEURONTIN) 300 MG capsule, Take 2 capsules (600 mg total) by mouth 3 (three) times daily., Disp: 540 capsule, Rfl: 2    ibuprofen (ADVIL,MOTRIN) 600 MG tablet, Take 1 tablet (600 mg total) by mouth every 8 (eight) hours as needed for Pain., Disp: 10 tablet, Rfl: 0    lactulose (CHRONULAC) 10 gram/15 mL solution, Take 30 mLs (20 g total) by mouth 2 (two) times daily as needed (constipation)., Disp: 120 mL, Rfl: 1    LIDOcaine-prilocaine (EMLA) cream, Apply topically as needed (prior to port access)., Disp: 30 g, Rfl: 0    lisinopriL (PRINIVIL,ZESTRIL) 40 MG tablet, TAKE 1 TABLET ONE TIME DAILY, Disp: 90 tablet, Rfl: 11    multivitamin with minerals tablet, Take 1 tablet by mouth once daily. , Disp: , Rfl:     nicotine (NICODERM CQ) 21 mg/24 hr, Place 1 patch onto the skin once daily., Disp: 28 patch, Rfl: 0    oxyCODONE-acetaminophen (PERCOCET) 5-325 mg per tablet, Take 1 tablet by mouth every 8 (eight) hours as needed for Pain., Disp: 90 tablet, Rfl: 0    OZEMPIC 1 mg/dose (4 mg/3 mL), INJECT 1 MG INTO THE SKIN EVERY 7 DAYS., Disp: 9 pen, Rfl: 11    pantoprazole (PROTONIX) 40 MG tablet, TAKE 1 TABLET EVERY DAY, Disp: 90 tablet, Rfl: 11  No current facility-administered medications for this visit.    Facility-Administered Medications Ordered in Other Visits:     0.9%  NaCl infusion, , Intravenous, Continuous, Tevin Clark MD, Stopped at 01/13/22  "1055  Review of patient's allergies indicates:  No Known Allergies    Ht 5' 4.5" (1.638 m)   Wt 126.4 kg (278 lb 10.6 oz)   BMI 47.09 kg/m²     Review of Systems   Constitutional: Negative for chills, fever, malaise/fatigue and night sweats.   HENT:  Negative for congestion, hearing loss, hoarse voice and sore throat.    Eyes:  Negative for blurred vision, double vision and pain.   Cardiovascular:  Negative for chest pain, claudication, leg swelling and palpitations.   Respiratory:  Negative for cough, shortness of breath and wheezing.    Endocrine: Negative for polydipsia, polyphagia and polyuria.   Hematologic/Lymphatic: Negative for adenopathy and bleeding problem. Does not bruise/bleed easily.   Skin:  Negative for poor wound healing.   Musculoskeletal:  Positive for joint pain and stiffness.   Gastrointestinal:  Negative for abdominal pain, constipation, diarrhea, heartburn, nausea and vomiting.   Genitourinary:  Negative for bladder incontinence, dysuria, flank pain and urgency.   Neurological:  Negative for dizziness, focal weakness, headaches, numbness, paresthesias, sensory change and tremors.   Psychiatric/Behavioral:  Negative for depression, hallucinations and suicidal ideas. The patient is not nervous/anxious.    Allergic/Immunologic: Negative for persistent infections.       Objective:    Left Knee Exam     Muscle Strength   The patient has normal left knee strength.    Tenderness   The patient is experiencing tenderness in the medial joint line.    Range of Motion   Extension:  0   Flexion:  120     Tests   Varus: negative Valgus: negative  Lachman:  Anterior - 1+    Posterior - negative    Other   Erythema: absent  Scars: absent  Sensation: normal  Pulse: present  Swelling: none    Comments:  Morbid obesity              Assessment:     Imaging: Left knee xray showing Grade 2 degenerative changes, worst medially. NO fracture or bone loss.         1. Primary osteoarthritis of left knee          Plan: "          We discussed the operative and non-operative management of her diagnosis. She is currently BMI 47, so we discussed that she needs to lose weight prior to any procedure. RTC 3 mo.

## 2023-07-06 ENCOUNTER — CLINICAL SUPPORT (OUTPATIENT)
Dept: SMOKING CESSATION | Facility: CLINIC | Age: 73
End: 2023-07-06
Payer: COMMERCIAL

## 2023-07-06 DIAGNOSIS — F17.200 NICOTINE DEPENDENCE: Primary | ICD-10-CM

## 2023-07-06 PROCEDURE — 99999 PR PBB SHADOW E&M-EST. PATIENT-LVL I: CPT | Mod: PBBFAC,,,

## 2023-07-06 PROCEDURE — 99402 PREV MED CNSL INDIV APPRX 30: CPT | Mod: S$GLB,,,

## 2023-07-06 PROCEDURE — 99999 PR PBB SHADOW E&M-EST. PATIENT-LVL I: ICD-10-PCS | Mod: PBBFAC,,,

## 2023-07-06 PROCEDURE — 99402 PR PREVENT COUNSEL,INDIV,30 MIN: ICD-10-PCS | Mod: S$GLB,,,

## 2023-07-06 NOTE — PROGRESS NOTES
Individual Follow-Up Form    7/6/2023    Quit Date:     Clinical Status of Patient: Outpatient    Continuing Medication: yes  Patches    Other Medications: none     Target Symptoms: Withdrawal and medication side effects. The following were  rated moderate (3) to severe (4) on TCRS:  Moderate (3): none  Severe (4): none    Comments: Spoke with patient for a length of time over the telephone and she reports smoking 5 cigarettes per day. She remains on a tobacco cessation medication regimen of 21mg nicotine patch. No refills needed at this time and no abnormal behaviors or mental changes reported. Patient states that she hasn't received a date for her surgery because she needs to lose weight in order to schedule her surgery. Patient states that she will continue to try to quit as soon as possible because she is eager to lose the weight quickly. Commended patient on her hard work and dedication to this quit attempt. Reviewed strategies, habitual behavior, stress, and high risk situations. Introduced stress with addition interventions, SOLVE, relaxation with interventions, nutrition, exercise, weight gain, and the importance of rewarding oneself for accomplishments toward becoming tobacco free. The patient will continue with  therapy sessions and medication monitoring by CTTS. Prescribed medication management will be by physician.     Diagnosis: F17.200    Next Visit: 7/27/2023

## 2023-07-10 ENCOUNTER — LAB VISIT (OUTPATIENT)
Dept: LAB | Facility: OTHER | Age: 73
End: 2023-07-10
Attending: STUDENT IN AN ORGANIZED HEALTH CARE EDUCATION/TRAINING PROGRAM
Payer: MEDICARE

## 2023-07-10 ENCOUNTER — OFFICE VISIT (OUTPATIENT)
Dept: HEMATOLOGY/ONCOLOGY | Facility: CLINIC | Age: 73
End: 2023-07-10
Payer: MEDICARE

## 2023-07-10 ENCOUNTER — INFUSION (OUTPATIENT)
Dept: INFUSION THERAPY | Facility: OTHER | Age: 73
End: 2023-07-10
Attending: STUDENT IN AN ORGANIZED HEALTH CARE EDUCATION/TRAINING PROGRAM
Payer: MEDICARE

## 2023-07-10 VITALS
TEMPERATURE: 98 F | HEART RATE: 79 BPM | BODY MASS INDEX: 46.58 KG/M2 | RESPIRATION RATE: 18 BRPM | OXYGEN SATURATION: 98 % | WEIGHT: 279.56 LBS | HEIGHT: 65 IN | SYSTOLIC BLOOD PRESSURE: 110 MMHG | DIASTOLIC BLOOD PRESSURE: 56 MMHG

## 2023-07-10 DIAGNOSIS — Z79.01 CHRONIC ANTICOAGULATION: ICD-10-CM

## 2023-07-10 DIAGNOSIS — C16.9 GASTRIC ADENOCARCINOMA: Primary | ICD-10-CM

## 2023-07-10 DIAGNOSIS — C16.9 GASTRIC ADENOCARCINOMA: ICD-10-CM

## 2023-07-10 DIAGNOSIS — D64.9 MILD CHRONIC ANEMIA: ICD-10-CM

## 2023-07-10 DIAGNOSIS — Z86.718 HISTORY OF DVT (DEEP VEIN THROMBOSIS): ICD-10-CM

## 2023-07-10 DIAGNOSIS — F17.200 NICOTINE DEPENDENCE WITH CURRENT USE: ICD-10-CM

## 2023-07-10 LAB
ALBUMIN SERPL BCP-MCNC: 3.2 G/DL (ref 3.5–5.2)
ALP SERPL-CCNC: 92 U/L (ref 55–135)
ALT SERPL W/O P-5'-P-CCNC: 11 U/L (ref 10–44)
ANION GAP SERPL CALC-SCNC: 7 MMOL/L (ref 8–16)
AST SERPL-CCNC: 17 U/L (ref 10–40)
BASOPHILS # BLD AUTO: 0.04 K/UL (ref 0–0.2)
BASOPHILS NFR BLD: 0.4 % (ref 0–1.9)
BILIRUB SERPL-MCNC: 0.4 MG/DL (ref 0.1–1)
BUN SERPL-MCNC: 13 MG/DL (ref 8–23)
CALCIUM SERPL-MCNC: 9.9 MG/DL (ref 8.7–10.5)
CHLORIDE SERPL-SCNC: 109 MMOL/L (ref 95–110)
CO2 SERPL-SCNC: 25 MMOL/L (ref 23–29)
CREAT SERPL-MCNC: 0.8 MG/DL (ref 0.5–1.4)
DIFFERENTIAL METHOD: ABNORMAL
EOSINOPHIL # BLD AUTO: 0.1 K/UL (ref 0–0.5)
EOSINOPHIL NFR BLD: 1.4 % (ref 0–8)
ERYTHROCYTE [DISTWIDTH] IN BLOOD BY AUTOMATED COUNT: 12.4 % (ref 11.5–14.5)
EST. GFR  (NO RACE VARIABLE): >60 ML/MIN/1.73 M^2
FERRITIN SERPL-MCNC: 316 NG/ML (ref 20–300)
GLUCOSE SERPL-MCNC: 101 MG/DL (ref 70–110)
HCT VFR BLD AUTO: 37.5 % (ref 37–48.5)
HGB BLD-MCNC: 11.7 G/DL (ref 12–16)
IMM GRANULOCYTES # BLD AUTO: 0.03 K/UL (ref 0–0.04)
IMM GRANULOCYTES NFR BLD AUTO: 0.3 % (ref 0–0.5)
LYMPHOCYTES # BLD AUTO: 1.9 K/UL (ref 1–4.8)
LYMPHOCYTES NFR BLD: 20.9 % (ref 18–48)
MCH RBC QN AUTO: 32.5 PG (ref 27–31)
MCHC RBC AUTO-ENTMCNC: 31.2 G/DL (ref 32–36)
MCV RBC AUTO: 104 FL (ref 82–98)
MONOCYTES # BLD AUTO: 0.7 K/UL (ref 0.3–1)
MONOCYTES NFR BLD: 7.9 % (ref 4–15)
NEUTROPHILS # BLD AUTO: 6.2 K/UL (ref 1.8–7.7)
NEUTROPHILS NFR BLD: 69.1 % (ref 38–73)
NRBC BLD-RTO: 0 /100 WBC
PLATELET # BLD AUTO: 221 K/UL (ref 150–450)
PMV BLD AUTO: 10.3 FL (ref 9.2–12.9)
POTASSIUM SERPL-SCNC: 4.3 MMOL/L (ref 3.5–5.1)
PROT SERPL-MCNC: 6.5 G/DL (ref 6–8.4)
RBC # BLD AUTO: 3.6 M/UL (ref 4–5.4)
SODIUM SERPL-SCNC: 141 MMOL/L (ref 136–145)
WBC # BLD AUTO: 9.03 K/UL (ref 3.9–12.7)

## 2023-07-10 PROCEDURE — 96523 IRRIG DRUG DELIVERY DEVICE: CPT

## 2023-07-10 PROCEDURE — 99999 PR PBB SHADOW E&M-EST. PATIENT-LVL IV: ICD-10-PCS | Mod: PBBFAC,,, | Performed by: STUDENT IN AN ORGANIZED HEALTH CARE EDUCATION/TRAINING PROGRAM

## 2023-07-10 PROCEDURE — 3078F PR MOST RECENT DIASTOLIC BLOOD PRESSURE < 80 MM HG: ICD-10-PCS | Mod: CPTII,S$GLB,, | Performed by: STUDENT IN AN ORGANIZED HEALTH CARE EDUCATION/TRAINING PROGRAM

## 2023-07-10 PROCEDURE — 99499 RISK ADDL DX/OHS AUDIT: ICD-10-PCS | Mod: HCNC,S$GLB,, | Performed by: STUDENT IN AN ORGANIZED HEALTH CARE EDUCATION/TRAINING PROGRAM

## 2023-07-10 PROCEDURE — 1101F PT FALLS ASSESS-DOCD LE1/YR: CPT | Mod: CPTII,S$GLB,, | Performed by: STUDENT IN AN ORGANIZED HEALTH CARE EDUCATION/TRAINING PROGRAM

## 2023-07-10 PROCEDURE — 1101F PR PT FALLS ASSESS DOC 0-1 FALLS W/OUT INJ PAST YR: ICD-10-PCS | Mod: CPTII,S$GLB,, | Performed by: STUDENT IN AN ORGANIZED HEALTH CARE EDUCATION/TRAINING PROGRAM

## 2023-07-10 PROCEDURE — 3008F PR BODY MASS INDEX (BMI) DOCUMENTED: ICD-10-PCS | Mod: CPTII,S$GLB,, | Performed by: STUDENT IN AN ORGANIZED HEALTH CARE EDUCATION/TRAINING PROGRAM

## 2023-07-10 PROCEDURE — A4216 STERILE WATER/SALINE, 10 ML: HCPCS | Performed by: STUDENT IN AN ORGANIZED HEALTH CARE EDUCATION/TRAINING PROGRAM

## 2023-07-10 PROCEDURE — 63600175 PHARM REV CODE 636 W HCPCS: Performed by: STUDENT IN AN ORGANIZED HEALTH CARE EDUCATION/TRAINING PROGRAM

## 2023-07-10 PROCEDURE — 3288F FALL RISK ASSESSMENT DOCD: CPT | Mod: CPTII,S$GLB,, | Performed by: STUDENT IN AN ORGANIZED HEALTH CARE EDUCATION/TRAINING PROGRAM

## 2023-07-10 PROCEDURE — 25000003 PHARM REV CODE 250: Performed by: STUDENT IN AN ORGANIZED HEALTH CARE EDUCATION/TRAINING PROGRAM

## 2023-07-10 PROCEDURE — 82728 ASSAY OF FERRITIN: CPT | Performed by: STUDENT IN AN ORGANIZED HEALTH CARE EDUCATION/TRAINING PROGRAM

## 2023-07-10 PROCEDURE — 1160F PR REVIEW ALL MEDS BY PRESCRIBER/CLIN PHARMACIST DOCUMENTED: ICD-10-PCS | Mod: CPTII,S$GLB,, | Performed by: STUDENT IN AN ORGANIZED HEALTH CARE EDUCATION/TRAINING PROGRAM

## 2023-07-10 PROCEDURE — 36415 COLL VENOUS BLD VENIPUNCTURE: CPT | Performed by: STUDENT IN AN ORGANIZED HEALTH CARE EDUCATION/TRAINING PROGRAM

## 2023-07-10 PROCEDURE — 3078F DIAST BP <80 MM HG: CPT | Mod: CPTII,S$GLB,, | Performed by: STUDENT IN AN ORGANIZED HEALTH CARE EDUCATION/TRAINING PROGRAM

## 2023-07-10 PROCEDURE — 1159F MED LIST DOCD IN RCRD: CPT | Mod: CPTII,S$GLB,, | Performed by: STUDENT IN AN ORGANIZED HEALTH CARE EDUCATION/TRAINING PROGRAM

## 2023-07-10 PROCEDURE — 1126F PR PAIN SEVERITY QUANTIFIED, NO PAIN PRESENT: ICD-10-PCS | Mod: CPTII,S$GLB,, | Performed by: STUDENT IN AN ORGANIZED HEALTH CARE EDUCATION/TRAINING PROGRAM

## 2023-07-10 PROCEDURE — 3008F BODY MASS INDEX DOCD: CPT | Mod: CPTII,S$GLB,, | Performed by: STUDENT IN AN ORGANIZED HEALTH CARE EDUCATION/TRAINING PROGRAM

## 2023-07-10 PROCEDURE — 80053 COMPREHEN METABOLIC PANEL: CPT | Performed by: STUDENT IN AN ORGANIZED HEALTH CARE EDUCATION/TRAINING PROGRAM

## 2023-07-10 PROCEDURE — 85025 COMPLETE CBC W/AUTO DIFF WBC: CPT | Performed by: STUDENT IN AN ORGANIZED HEALTH CARE EDUCATION/TRAINING PROGRAM

## 2023-07-10 PROCEDURE — 99215 PR OFFICE/OUTPT VISIT, EST, LEVL V, 40-54 MIN: ICD-10-PCS | Mod: S$GLB,,, | Performed by: STUDENT IN AN ORGANIZED HEALTH CARE EDUCATION/TRAINING PROGRAM

## 2023-07-10 PROCEDURE — 3074F SYST BP LT 130 MM HG: CPT | Mod: CPTII,S$GLB,, | Performed by: STUDENT IN AN ORGANIZED HEALTH CARE EDUCATION/TRAINING PROGRAM

## 2023-07-10 PROCEDURE — 4010F ACE/ARB THERAPY RXD/TAKEN: CPT | Mod: CPTII,S$GLB,, | Performed by: STUDENT IN AN ORGANIZED HEALTH CARE EDUCATION/TRAINING PROGRAM

## 2023-07-10 PROCEDURE — 99999 PR PBB SHADOW E&M-EST. PATIENT-LVL IV: CPT | Mod: PBBFAC,,, | Performed by: STUDENT IN AN ORGANIZED HEALTH CARE EDUCATION/TRAINING PROGRAM

## 2023-07-10 PROCEDURE — 4010F PR ACE/ARB THEARPY RXD/TAKEN: ICD-10-PCS | Mod: CPTII,S$GLB,, | Performed by: STUDENT IN AN ORGANIZED HEALTH CARE EDUCATION/TRAINING PROGRAM

## 2023-07-10 PROCEDURE — 99215 OFFICE O/P EST HI 40 MIN: CPT | Mod: S$GLB,,, | Performed by: STUDENT IN AN ORGANIZED HEALTH CARE EDUCATION/TRAINING PROGRAM

## 2023-07-10 PROCEDURE — 1160F RVW MEDS BY RX/DR IN RCRD: CPT | Mod: CPTII,S$GLB,, | Performed by: STUDENT IN AN ORGANIZED HEALTH CARE EDUCATION/TRAINING PROGRAM

## 2023-07-10 PROCEDURE — 1159F PR MEDICATION LIST DOCUMENTED IN MEDICAL RECORD: ICD-10-PCS | Mod: CPTII,S$GLB,, | Performed by: STUDENT IN AN ORGANIZED HEALTH CARE EDUCATION/TRAINING PROGRAM

## 2023-07-10 PROCEDURE — 3288F PR FALLS RISK ASSESSMENT DOCUMENTED: ICD-10-PCS | Mod: CPTII,S$GLB,, | Performed by: STUDENT IN AN ORGANIZED HEALTH CARE EDUCATION/TRAINING PROGRAM

## 2023-07-10 PROCEDURE — 3074F PR MOST RECENT SYSTOLIC BLOOD PRESSURE < 130 MM HG: ICD-10-PCS | Mod: CPTII,S$GLB,, | Performed by: STUDENT IN AN ORGANIZED HEALTH CARE EDUCATION/TRAINING PROGRAM

## 2023-07-10 PROCEDURE — 99499 UNLISTED E&M SERVICE: CPT | Mod: HCNC,S$GLB,, | Performed by: STUDENT IN AN ORGANIZED HEALTH CARE EDUCATION/TRAINING PROGRAM

## 2023-07-10 PROCEDURE — 1126F AMNT PAIN NOTED NONE PRSNT: CPT | Mod: CPTII,S$GLB,, | Performed by: STUDENT IN AN ORGANIZED HEALTH CARE EDUCATION/TRAINING PROGRAM

## 2023-07-10 RX ORDER — SODIUM CHLORIDE 0.9 % (FLUSH) 0.9 %
10 SYRINGE (ML) INJECTION
Status: DISCONTINUED | OUTPATIENT
Start: 2023-07-10 | End: 2023-07-10 | Stop reason: HOSPADM

## 2023-07-10 RX ORDER — HEPARIN 100 UNIT/ML
500 SYRINGE INTRAVENOUS
Status: DISCONTINUED | OUTPATIENT
Start: 2023-07-10 | End: 2023-07-10 | Stop reason: HOSPADM

## 2023-07-10 RX ADMIN — SODIUM CHLORIDE, PRESERVATIVE FREE 10 ML: 5 INJECTION INTRAVENOUS at 10:07

## 2023-07-10 RX ADMIN — HEPARIN 500 UNITS: 100 SYRINGE at 10:07

## 2023-07-10 NOTE — PLAN OF CARE
Port A Cath accessed, normal saline flushed, blood return present. Patient tolerated well. Port A Cath 20 gauge 3/4 inch needle deaccessed/ removed, blood return present and heparin locked. No apparent distress noted. Discharge instructions given to patient. Patient understands instructions.

## 2023-07-10 NOTE — PROGRESS NOTES
Hematology- Oncology Clinic Note :      7/10/2023    RFV / chief complaint- Gastric adenocarcinoma          HPI  Pt is a 72 y.o. female who  has a past medical history of YOUNG (acute kidney injury) (10/15/2021), Anemia, Arthritis, BMI 60.0-69.9, adult, Cataract, Chronic diastolic congestive heart failure (1/27/2021), Depression, Dry eye syndrome, DVT of deep femoral vein, right (5/29/2023), Gastric adenocarcinoma (5/25/2021), GERD (gastroesophageal reflux disease), Glaucoma suspect, History of gastric ulcer, History of psychiatric hospitalization, psychiatric care, Hyperlipidemia, Hypertension, Hypothyroidism, Morbid obesity, Neuromuscular disorder, Sleep apnea, and Thyroid disease.   Pt presents to the clinic today for gastric cancer    Doing good. Bilateral leg swelling. Continues to smoke. No issues with bleeding , on eliquis  Joint pain is stable. Physical activity increasing slowly.     Advised to quit smoking    Oncology presentation/ HPI  Pt was being evaluated for gastric bypass surgery   Feb 2021 EGD- Gastric tumor in the prepyloric region of the stomach,  March 2021 EUS- Gastric tumor in the prepyloric region of the stomach.  Both above biopsies did not show malignancy  5/25/21 EGD- Gastric tumor on the posterior wall of the gastric antrum. Complete removal was accomplished.         Reviewed past medical/surgical/social history    Past Medical History:   Diagnosis Date    YOUNG (acute kidney injury) 10/15/2021    Anemia     2018    Arthritis     BMI 60.0-69.9, adult     Cataract     Chronic diastolic congestive heart failure 1/27/2021    Depression     Dry eye syndrome     DVT of deep femoral vein, right 5/29/2023    Gastric adenocarcinoma 5/25/2021    GERD (gastroesophageal reflux disease)     Glaucoma suspect     History of gastric ulcer     History of psychiatric hospitalization     Hx of psychiatric care     Celexa, no recall of charted Effexor, Lexapro, Desyrel prescriptions    Hyperlipidemia      Hypertension     Hypothyroidism     Morbid obesity     Neuromuscular disorder     Sleep apnea     Thyroid disease       Past Surgical History:   Procedure Laterality Date    APPENDECTOMY      CARDIAC SURGERY      CATARACT EXTRACTION W/  INTRAOCULAR LENS IMPLANT Bilateral     MFIOL OU    CORONARY ANGIOGRAPHY Bilateral 2/24/2021    Procedure: ANGIOGRAM, CORONARY ARTERY;  Surgeon: Cresencio Ridley MD;  Location: [facility] CATH LAB;  Service: Cardiology;  Laterality: Bilateral;  Covid test needed - ok per Danay SSCU. Pt. w/o transportation or family    ENDOSCOPIC ULTRASOUND OF UPPER GASTROINTESTINAL TRACT N/A 3/17/2021    Procedure: ULTRASOUND, UPPER GI TRACT, ENDOSCOPIC;  Surgeon: Gerald Anaya MD;  Location: [facility] (2ND FLR);  Service: Endoscopy;  Laterality: N/A;  Pt unable to get a ride for swab. Will do rapid test at 8:30 - ttr    ENDOSCOPIC ULTRASOUND OF UPPER GASTROINTESTINAL TRACT N/A 1/13/2022    Procedure: ULTRASOUND, UPPER GI TRACT, ENDOSCOPIC;  Surgeon: Kevin Carballo MD;  [facility]  Service: Endoscopy;  Laterality: N/A;  blood thinner approval received, see telephone encounter 1/7/22-BB  rapid  instructions given verbally and sent to address on file in pt's chart-BB    ENDOSCOPIC ULTRASOUND OF UPPER GASTROINTESTINAL TRACT N/A 10/11/2022    Procedure: ULTRASOUND, UPPER GI TRACT, ENDOSCOPIC;  Surgeon: Dequan Ridley MD;  [facility]  Service: Endoscopy;  Laterality: N/A;    ESOPHAGOGASTRODUODENOSCOPY N/A 2/5/2021    Procedure: EGD (ESOPHAGOGASTRODUODENOSCOPY);  Surgeon: Matthew Hernadez MD;  [facility]  Service: Endoscopy;  Laterality: N/A;  BMI-58    Wt: 361#     COVID test at [facility] on 2/2-GT    ESOPHAGOGASTRODUODENOSCOPY N/A 3/17/2021    Procedure: EGD (ESOPHAGOGASTRODUODENOSCOPY);  Surgeon: Gerald Anaya MD;  [facility]O [facility]  Service: Endoscopy;  Laterality: N/A;    ESOPHAGOGASTRODUODENOSCOPY N/A 5/25/2021    Procedure: EGD  (ESOPHAGOGASTRODUODENOSCOPY);  Surgeon: Kevin Carballo MD;  Location: [facility] ENDO (2ND FLR);  Service: Endoscopy;  Laterality: N/A;  ESD 2 hours  Full COVID vaccination on file. ttr    ESOPHAGOGASTRODUODENOSCOPY N/A 1/13/2022    Procedure: EGD (ESOPHAGOGASTRODUODENOSCOPY);  Surgeon: Kevin Carballo MD;  Location: [facility] ENDO (2ND FLR);  Service: Endoscopy;  Laterality: N/A;  blood thinner approval, see telephone encounter 1/7/22-BB  rapid  instructions given verbally and sent to address on file in pt's chart-BB    ESOPHAGOGASTRODUODENOSCOPY N/A 10/11/2022    Procedure: EGD (ESOPHAGOGASTRODUODENOSCOPY);  Surgeon: Dequan Ridley MD;  Location: [facility] ENDO (2ND FLR);  Service: Endoscopy;  Laterality: N/A;  She is do for EGD/EUS now. Personal history of gastric cancer. Ca Coats #[id number].   Thanks,   Kevin Fuentes MD    Pt is fully vaccinated-DS  9/14/22-Approval to hold Eliquis rec'd from Dr. Pelaez (see telephone encounter 9/14/22)-DS  9/14/22-Ins    EYE SURGERY      INJECTION OF ANESTHETIC AGENT AROUND NERVE Left 7/10/2018    Procedure: BLOCK, NERVE;  Surgeon: Samantha Vidal MD;  [facility];  Service: Pain Management;  Laterality: Left;  Genicular     INJECTION OF ANESTHETIC AGENT AROUND NERVE Left 7/19/2019    Procedure: Block, Nerve NERVE BLOCK GENICULAR WITH PHENOL 6%;  Surgeon: Samantha Vidal MD;  Location: [facility] PAIN MGT;  Service: Pain Management;  Laterality: Left;  NEEDS CONSENT    INSERTION OF TUNNELED CENTRAL VENOUS CATHETER (CVC) WITH SUBCUTANEOUS PORT N/A 7/9/2021    Procedure: INSERTION, PORT-A-CATH;  Surgeon: Mario Paz MD;  Location: [facility] CATH LAB;  Service: Radiology;  Laterality: N/A;  PORT PLACEMENT    RADIOFREQUENCY ABLATION Left 6/19/2020    Procedure: RADIOFREQUENCY ABLATION LEFT GENICULAR;  Surgeon: Tevin Clark MD;  Location: [facility] PAIN MGT;  Service: Pain Management;  Laterality: Left;  Left Genicular RFA    RADIOFREQUENCY ABLATION Left 1/22/2021     Procedure: RADIOFREQUENCY ABLATION LEFT GENICULAR;  Surgeon: Tevin Clrak MD;  Location: [facility] PAIN MGT;  Service: Pain Management;  Laterality: Left;    RADIOFREQUENCY ABLATION Left 7/30/2021    Procedure: RADIOFREQUENCY ABLATION, GENICULAR COOLED NEED CONSENT;  Surgeon: Tevin Clark MD;  Location: [facility] PAIN MGT;  Service: Pain Management;  Laterality: Left;    RADIOFREQUENCY ABLATION Left 4/22/2022    Procedure: RADIOFREQUENCY ABLATION, GENICULAR COOLED , LEFT;  Surgeon: Tevin Clark MD;  Location: [facility] PAIN MGT;  Service: Pain Management;  Laterality: Left;    RADIOFREQUENCY ABLATION Left 12/23/2022    Procedure: RADIOFREQUENCY ABLATION LEFT GENICULAR COOLED CLEARED TO HOLD ELIQUIS 3 DAYS  NEEDS CONSENT;  Surgeon: Tevin Clark MD;  Location: [facility] PAIN MGT;  Service: Pain Management;  Laterality: Left;    TONSILLECTOMY        (Not in a hospital admission)      Review of patient's allergies indicates:  No Known Allergies   Social History     Tobacco Use    Smoking status: Some Days     Years: 50.00     Types: Cigarettes    Smokeless tobacco: Never    Tobacco comments:     currently smoking <5 cigarettes most days   Substance Use Topics    Alcohol use: Yes     Comment: robbiely      Family History   Problem Relation Age of Onset    No Known Problems Mother     No Known Problems Father     Colon cancer Neg Hx     Esophageal cancer Neg Hx           Review of Systems :  Review of Systems   Constitutional:  Positive for malaise/fatigue. Negative for chills, diaphoresis, fever and weight loss.   HENT: Negative.  Negative for congestion, hearing loss, nosebleeds, sore throat and tinnitus.    Eyes:  Negative for blurred vision, discharge and redness.   Respiratory:  Negative for cough, hemoptysis, sputum production, shortness of breath and wheezing.    Cardiovascular:  Positive for leg swelling. Negative for chest pain and palpitations.   Gastrointestinal:  Negative for abdominal pain, blood in stool,  "constipation, diarrhea, heartburn, melena, nausea and vomiting.   Genitourinary: Negative.    Musculoskeletal:  Positive for joint pain. Negative for back pain, falls and myalgias.   Skin:  Negative for itching and rash.   Neurological:  Negative for dizziness, tingling, sensory change, speech change, focal weakness, seizures, loss of consciousness, weakness and headaches.   Endo/Heme/Allergies: Negative.  Does not bruise/bleed easily.   Psychiatric/Behavioral: Negative.  Negative for depression. The patient is not nervous/anxious and does not have insomnia.              Physical Exam :  BP (!) 110/56 (BP Location: Left arm, Patient Position: Sitting, BP Method: Medium (Automatic)) Comment (BP Method): Lower Left Arm  Pulse 79   Temp 97.9 °F (36.6 °C) (Oral)   Resp 18   Ht 5' 4.5" (1.638 m)   Wt 126.8 kg (279 lb 8.7 oz)   SpO2 98%   BMI 47.24 kg/m²   Wt Readings from Last 3 Encounters:   07/10/23 126.8 kg (279 lb 8.7 oz)   06/30/23 126.4 kg (278 lb 10.6 oz)   06/14/23 126.6 kg (279 lb 1.6 oz)       Body mass index is 47.24 kg/m².      Physical Exam  Vitals and nursing note reviewed.   Constitutional:       General: She is not in acute distress.     Appearance: She is well-developed. She is not ill-appearing.   HENT:      Head: Normocephalic and atraumatic.      Right Ear: External ear normal.      Left Ear: External ear normal.      Mouth/Throat:      Pharynx: No oropharyngeal exudate.   Eyes:      General: No scleral icterus.     Conjunctiva/sclera:      Right eye: No hemorrhage.     Left eye: No hemorrhage.  Neck:      Thyroid: No thyromegaly.      Trachea: No tracheal deviation.   Cardiovascular:      Rate and Rhythm: Normal rate and regular rhythm.      Heart sounds: Normal heart sounds. No murmur heard.  Pulmonary:      Effort: Pulmonary effort is normal. No respiratory distress.      Breath sounds: Decreased breath sounds present. No wheezing or rales.      Comments: Port SOI  Abdominal:      General: " Bowel sounds are normal. There is no distension (obese).      Palpations: Abdomen is soft. There is no mass.      Tenderness: There is no abdominal tenderness. There is no rebound.   Musculoskeletal:         General: No swelling or tenderness. Normal range of motion.      Cervical back: Normal range of motion and neck supple.   Lymphadenopathy:      Cervical: No cervical adenopathy.   Skin:     General: Skin is warm.      Findings: No erythema, rash or wound.   Neurological:      Mental Status: She is alert and oriented to person, place, and time.      Cranial Nerves: No cranial nerve deficit.      Gait: Gait abnormal (uses cane).   Psychiatric:         Behavior: Behavior normal.           Current Outpatient Medications   Medication Sig Dispense Refill    acetaminophen (TYLENOL) 500 MG tablet       amLODIPine (NORVASC) 10 MG tablet TAKE 1 TABLET ONE TIME DAILY 90 tablet 11    atorvastatin (LIPITOR) 20 MG tablet TAKE 1 TABLET EVERY DAY 90 tablet 11    B-complex with vitamin C (Z-BEC OR EQUIV) tablet Take 1 tablet by mouth once daily.       CALCIUM CITRATE-VITAMIN D2 ORAL Take 1 tablet by mouth once daily.       citalopram (CELEXA) 10 MG tablet TAKE 1 TABLET EVERY DAY 90 tablet 3    dextran 70-hypromellose (TEARS) ophthalmic solution Place 1 drop into the right eye every 6 (six) hours. 30 mL 0    ELIQUIS 5 mg Tab TAKE 1 TABLET TWICE DAILY 180 tablet 2    ferrous gluconate (FERGON) 324 MG tablet TAKE 1 TABLET (324 MG TOTAL) BY MOUTH DAILY WITH BREAKFAST. 90 tablet 11    furosemide (LASIX) 80 MG tablet TAKE 1 TABLET TWICE DAILY 180 tablet 3    gabapentin (NEURONTIN) 300 MG capsule Take 2 capsules (600 mg total) by mouth 3 (three) times daily. 540 capsule 2    ibuprofen (ADVIL,MOTRIN) 600 MG tablet Take 1 tablet (600 mg total) by mouth every 8 (eight) hours as needed for Pain. 10 tablet 0    lactulose (CHRONULAC) 10 gram/15 mL solution Take 30 mLs (20 g total) by mouth 2 (two) times daily as needed (constipation). 120 mL  1    LIDOcaine-prilocaine (EMLA) cream Apply topically as needed (prior to port access). 30 g 0    lisinopriL (PRINIVIL,ZESTRIL) 40 MG tablet TAKE 1 TABLET ONE TIME DAILY 90 tablet 11    multivitamin with minerals tablet Take 1 tablet by mouth once daily.       nicotine (NICODERM CQ) 21 mg/24 hr Place 1 patch onto the skin once daily. 28 patch 0    oxyCODONE-acetaminophen (PERCOCET) 5-325 mg per tablet Take 1 tablet by mouth every 8 (eight) hours as needed for Pain. 90 tablet 0    OZEMPIC 1 mg/dose (4 mg/3 mL) INJECT 1 MG INTO THE SKIN EVERY 7 DAYS. 9 pen 11    pantoprazole (PROTONIX) 40 MG tablet TAKE 1 TABLET EVERY DAY 90 tablet 11     No current facility-administered medications for this visit.     Facility-Administered Medications Ordered in Other Visits   Medication Dose Route Frequency Provider Last Rate Last Admin    0.9%  NaCl infusion   Intravenous Continuous Tevin Clark MD   Stopped at 01/13/22 1055    heparin, porcine (PF) 100 unit/mL injection flush 500 Units  500 Units Intravenous PRN Cathy Pelaez MD   500 Units at 07/10/23 1003    sodium chloride 0.9% flush 10 mL  10 mL Intravenous PRN Cathy Pelaez MD   10 mL at 07/10/23 1002       Pertinent Diagnostic studies:      Lab Visit on 07/10/2023   Component Date Value Ref Range Status    WBC 07/10/2023 9.03  3.90 - 12.70 K/uL Final    RBC 07/10/2023 3.60 (L)  4.00 - 5.40 M/uL Final    Hemoglobin 07/10/2023 11.7 (L)  12.0 - 16.0 g/dL Final    Hematocrit 07/10/2023 37.5  37.0 - 48.5 % Final    MCV 07/10/2023 104 (H)  82 - 98 fL Final    MCH 07/10/2023 32.5 (H)  27.0 - 31.0 pg Final    MCHC 07/10/2023 31.2 (L)  32.0 - 36.0 g/dL Final    RDW 07/10/2023 12.4  11.5 - 14.5 % Final    Platelets 07/10/2023 221  150 - 450 K/uL Final    MPV 07/10/2023 10.3  9.2 - 12.9 fL Final    Immature Granulocytes 07/10/2023 0.3  0.0 - 0.5 % Final    Gran # (ANC) 07/10/2023 6.2  1.8 - 7.7 K/uL Final    Immature Grans (Abs) 07/10/2023 0.03  0.00 - 0.04 K/uL Final    Comment:  Mild elevation in immature granulocytes is non specific and   can be seen in a variety of conditions including stress response,   acute inflammation, trauma and pregnancy. Correlation with other   laboratory and clinical findings is essential.      Lymph # 07/10/2023 1.9  1.0 - 4.8 K/uL Final    Mono # 07/10/2023 0.7  0.3 - 1.0 K/uL Final    Eos # 07/10/2023 0.1  0.0 - 0.5 K/uL Final    Baso # 07/10/2023 0.04  0.00 - 0.20 K/uL Final    nRBC 07/10/2023 0  0 /100 WBC Final    Gran % 07/10/2023 69.1  38.0 - 73.0 % Final    Lymph % 07/10/2023 20.9  18.0 - 48.0 % Final    Mono % 07/10/2023 7.9  4.0 - 15.0 % Final    Eosinophil % 07/10/2023 1.4  0.0 - 8.0 % Final    Basophil % 07/10/2023 0.4  0.0 - 1.9 % Final    Differential Method 07/10/2023 Automated   Final    Ferritin 07/10/2023 316 (H)  20.0 - 300.0 ng/mL Final    Sodium 07/10/2023 141  136 - 145 mmol/L Final    Potassium 07/10/2023 4.3  3.5 - 5.1 mmol/L Final    Chloride 07/10/2023 109  95 - 110 mmol/L Final    CO2 07/10/2023 25  23 - 29 mmol/L Final    Glucose 07/10/2023 101  70 - 110 mg/dL Final    BUN 07/10/2023 13  8 - 23 mg/dL Final    Creatinine 07/10/2023 0.8  0.5 - 1.4 mg/dL Final    Calcium 07/10/2023 9.9  8.7 - 10.5 mg/dL Final    Total Protein 07/10/2023 6.5  6.0 - 8.4 g/dL Final    Albumin 07/10/2023 3.2 (L)  3.5 - 5.2 g/dL Final    Total Bilirubin 07/10/2023 0.4  0.1 - 1.0 mg/dL Final    Comment: For infants and newborns, interpretation of results should be based  on gestational age, weight and in agreement with clinical  observations.    Premature Infant recommended reference ranges:  Up to 24 hours.............<8.0 mg/dL  Up to 48 hours............<12.0 mg/dL  3-5 days..................<15.0 mg/dL  6-29 days.................<15.0 mg/dL      Alkaline Phosphatase 07/10/2023 92  55 - 135 U/L Final    AST 07/10/2023 17  10 - 40 U/L Final    ALT 07/10/2023 11  10 - 44 U/L Final    eGFR 07/10/2023 >60  >60 mL/min/1.73 m^2 Final    Anion Gap 07/10/2023 7  (L)  8 - 16 mmol/L Final         Patient Active Problem List    Diagnosis Date Noted    Gastric adenocarcinoma 05/25/2021    Chronic diastolic congestive heart failure 01/27/2021    Morbid obesity with BMI of 45.0-49.9, adult 04/29/2019    Essential hypertension 04/29/2019    Left knee DJD 12/21/2018    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/2023    DVT of deep femoral vein, right 05/29/2023    Current mild episode of major depressive disorder without prior episode 03/07/2022    History of DVT (deep vein thrombosis) 10/15/2021    Chronic pain 07/30/2021    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    Abnormal cardiovascular stress test 02/24/2021    Tobacco abuse 01/28/2021    Pure hypercholesterolemia 01/27/2021    Gastroesophageal reflux disease 03/22/2020    Osteopenia of neck of left femur 01/14/2020    Left knee pain 07/19/2019    Hypothyroidism 07/18/2019    Debility     At high risk for injury related to fall     Gait instability 07/15/2019    Major depressive disorder 04/29/2019    Joint pain 04/29/2019    Chronic knee pain 11/30/2018    Obstructive sleep apnea 08/09/2018    Primary osteoarthritis of left knee 07/10/2018     Active Problem List with Overview Notes    Diagnosis Date Noted    Gastric adenocarcinoma 05/25/2021     gastric cancer cT2 or higher      Chronic diastolic congestive heart failure 01/27/2021    Morbid obesity with BMI of 45.0-49.9, adult 04/29/2019    Essential hypertension 04/29/2019    Left knee DJD 12/21/2018    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/2023    DVT of deep femoral vein, right 05/29/2023    Current mild episode of major depressive disorder without prior episode 03/07/2022    History of DVT (deep vein thrombosis) 10/15/2021    Chronic pain 07/30/2021    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    Abnormal cardiovascular stress test 02/24/2021    Tobacco abuse 01/28/2021     Pure hypercholesterolemia 01/27/2021    Gastroesophageal reflux disease 03/22/2020    Osteopenia of neck of left femur 01/14/2020    Left knee pain 07/19/2019    Hypothyroidism 07/18/2019    Debility     At high risk for injury related to fall     Gait instability 07/15/2019    Major depressive disorder 04/29/2019    Joint pain 04/29/2019    Chronic knee pain 11/30/2018    Obstructive sleep apnea 08/09/2018    Primary osteoarthritis of left knee 07/10/2018         Oncology History   Gastric adenocarcinoma   2/5/2021 Procedure    EGD  Feb 2021 EGD- Gastric tumor in the prepyloric region of the stomach,     3/17/2021 Procedure    EUS  Impression:           - Gastric tumor in the prepyloric region of the                         stomach. Biopsied. Lesion appears amenable to                         endoscopic resection (ESD) - Dr. Carballo was present                         to view the lesion.      5/25/2021 Initial Diagnosis    Gastric adenocarcinoma     5/25/2021 Pathology Significant Finding    Adenocarcinoma, moderate to poorly differentiated   Tumor invades deep layers of submuocsa with deep margin extensively positive   Deep margin is extensively positive   Lateral margins are negative for neoplasia or malignancy      5/25/2021 Cancer Staged    Staging form: Stomach, AJCC 8th Edition  - Pathologic stage from 5/25/2021: Stage Unknown (pT1, pNX, cM0)     6/18/2021 Imaging Significant Findings    CT CAP   Asymmetric gastric wall thickening at the level of the gastric antrum presumably representing the patient's gastric adenocarcinoma.  I see no CT evidence for metastatic disease.     Low-density focus lower pole left kidney does not meet criteria for a simple cyst.  Findings can be further evaluated with ultrasound.  Additional subcentimeter low-density foci in the right kidney likely too small to characterize by imaging.     6/28/2021 Tumor Conference       [facility]   UGI MULTIDISCIPLINARY TUMOR  BOARD  PATIENT REVIEW FORM   ____________________________________________________________    CLINIC #: [id number]  DATE: 6/28/2021    DIAGNOSIS: gastric CA    PRESENTER: Latanya/ Donnie Sanders    PATIENT SUMMARY:   This 71 y/o female had incidental finding of gastric CA on EGD as part of bariatric evaluation given BMI 57. Underwent ESD per Dr. Carballo. Reviewed pathology - pT1b with LVI positive, extensive tumor at deep margin.     BOARD RECOMMENDATIONS:   Recommend perioperative chemotherapy, 4 cycles of FLOT    CONSULT NEEDED:     [] Surgery    [x] Hem/Onc    [] Rad/Onc    [] Dietary                 [] Social Service    [] Psychology       [] AES  [] Radiology     ESD Pathology Stage: Tumor 1b  Node(s) 0 Metastasis 0      GROUP STAGE:  [] O    [] 1A    [] IB    [] IIA    [] IIB     [] IIIA     [] IIIB     [] IIIC    []IV  [] Local recurrence     [] Regional recurrence     [] Distant recurrence   Metastatic site(s): none         [x] Jennifer'l Treatment Guidelines reviewed and care planned is consistent with guidelines.         (i.e., NCCN, NCI, PD, ACO, AUA, etc.)    PRESENTATION AT CANCER CONFERENCE:         [x] Prospective    [] Retrospective     [] Follow-Up       7/21/2021 - 9/30/2021 Chemotherapy    Treatment Summary   Plan Name: OP GASTRIC FLOT - FLUOROURACIL LEUCOVORIN OXALIPLATIN DOCETAXEL Q2W  Treatment Goal: Curative  Status: Inactive  Start Date: 7/21/2021  End Date: 9/30/2021  Provider: Cathy Pelaez MD  Chemotherapy: DOCEtaxeL (TAXOTERE) 50 mg/m2 = 135 mg in sodium chloride 0.9% 250 mL chemo infusion, 50 mg/m2 = 135 mg, Intravenous, Clinic/[facility] 1 time, 4 of 4 cycles  Dose modification: 40 mg/m2 (original dose 50 mg/m2, Cycle 3)  Administration: 135 mg (7/21/2021), 135 mg (8/12/2021), 105 mg (9/15/2021), 105 mg (9/29/2021)  leucovorin calcium 200 mg/m2 = 545 mg in dextrose 5 % 250 mL infusion, 200 mg/m2 = 545 mg, Intravenous, [facility] 1 time, 4 of 4 cycles  Administration: 545 mg (7/21/2021), 535 mg (8/12/2021),  530 mg (9/15/2021), 525 mg (9/29/2021)  oxaliplatin (ELOXATIN) 85 mg/m2 = 232 mg in dextrose 5 % 500 mL chemo infusion, 85 mg/m2 = 232 mg, Intravenous, Clinic[facility] 1 time, 4 of 4 cycles  Administration: 232 mg (7/21/2021), 228 mg (8/12/2021), 225 mg (9/15/2021), 223 mg (9/29/2021)  fluorouraciL 7,000 mg in sodium chloride 0.9% 240 mL chemo infusion, 7,100 mg, Intravenous, Over 24 hours, 4 of 11 cycles  Dose modification: 2,000 mg/m2 (original dose 2,600 mg/m2, Cycle 3), 225 mg/m2/day (original dose 2,600 mg/m2, Cycle 5)  Administration: 7,000 mg (7/21/2021), 6,970 mg (8/12/2021), 5,300 mg (9/15/2021), 5,000 mg (9/29/2021)     9/16/2021 Imaging Significant Findings    Thrombosis of the right popliteal, posterior tibial, and peroneal veins.     11/15/2021 Imaging Significant Findings    CT CAP Overall, no detrimental change compared to previous.  Persistent eccentric wall thickening at the level of the antrum in this patient with provided history of gastric adenocarcinoma.  No metastatic disease.     Subcentimeter pulmonary nodules unchanged.  No new nodules.     Cholelithiasis     1/13/2022 Procedure    EUS  Impression:            - Normal esophagus.                          - Scar in the gastric antrum. Biopsied.                          - Congested, nodular and ulcerated mucosa in the                          antrum. Biopsied. Treated with argon plasma                          coagulation (APC).                          - Acquired deformity in the prepyloric region of                          the stomach.                          - Normal examined duodenum.                          - There was abnormal echogenicity in the                          visualized portion of the liver. This was                          hyperechoic and homogenous. This can be seen with                          fatty liver.                          - Hyperechoic material consistent with sludge was                          visualized  endosonographically in the gallbladder.                          - There was dilation in the common bile duct which                          measured up to 10.8 mm.                          - Wall thickening was seen in the antrum of the                          stomach. This probable related to previous ESD.      2/14/2022 -  Chemotherapy    Treatment Summary   Plan Name: OP FLUOROURACIL (5 DAY) QW + XRT  Treatment Goal: Curative  Status: Active  Start Date: 2/14/2022  End Date: 3/30/2022  Provider: Cathy Pelaez MD  Chemotherapy: [No matching medication found in this treatment plan]     2/14/2022 - 3/29/2022 Radiation Therapy    Treating physician: Dr. Sonu Darden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

## 2023-07-10 NOTE — LETTER
July 10, 2023    Ca Tejedaemmett  [address]  [address]  [place] [address]             [facility]  [address], [address]  [place] [address]  Phone: [phone] To Whom It May Concern :    Patient was scheduled and seen in clinic today and she was accompanied by Ms. Jamaica Lagos Trip number [id number]. Round trip total of 8 miles.    If you have any questions or concerns, please don't hesitate to call [phone].    Sincerely,        Cathy Pelaez MD

## 2023-07-11 ENCOUNTER — TELEPHONE (OUTPATIENT)
Dept: ENDOSCOPY | Facility: HOSPITAL | Age: 73
End: 2023-07-11

## 2023-07-11 DIAGNOSIS — C16.9 MALIGNANT NEOPLASM OF STOMACH, UNSPECIFIED LOCATION: Primary | ICD-10-CM

## 2023-07-11 DIAGNOSIS — C16.9 GASTRIC ADENOCARCINOMA: Primary | ICD-10-CM

## 2023-07-12 NOTE — TELEPHONE ENCOUNTER
Care Due:                  Date            Visit Type   Department     Provider  --------------------------------------------------------------------------------                                EP -                              PRIMARY      [facility] INTERNAL  Last Visit: 05-      CARE ([facility])   MEDICINE       Lei Garcia                               -                              PRIMARY      [facility] INTERNAL  Next Visit: 06-      CARE ([facility])   MEDICINE       Lei Garcia                                                            Last  Test          Frequency    Reason                     Performed    Due Date  --------------------------------------------------------------------------------    HBA1C.......  6 months...  OZEMPIC..................  Not Found    Overdue    Lipid Panel.  12 months..  atorvastatin.............  Not Found    Overdue    [employer] Embedded Care Due Messages. Reference number: [id number].   7/12/2023 10:53:24 AM LUCIAN

## 2023-07-12 NOTE — TELEPHONE ENCOUNTER
Refill Routing Note   Medication(s) are not appropriate for processing by [facility] for the following reason(s):      Required labs outdated    [facility] action(s):  Defer Labs due          Appointments  past 12m or future 3m with PCP    Date Provider   Last Visit   5/29/2023 Lei Garcia MD   Next Visit   Visit date not found Lei Garcia MD   ED visits in past 90 days: 0        Note composed:3:23 PM 07/12/2023

## 2023-07-12 NOTE — TELEPHONE ENCOUNTER
----- Message from Kevin Carballo MD sent at 7/11/2023 12:28 PM CDT -----  Regarding: FW: surveillance EUS  Please schedule an EGD/EUS (radial) for personal history of gastric cancer surveillance on October 2023. Main or [facility]. 45 minutes.   Thanks,  Kevin Carballo MD  ----- Message -----  From: Cathy Pelaez MD  Sent: 7/10/2023   9:53 AM CDT  To: Kevin Carballo MD, Har[facility] Staff  Subject: surveillance EUS                                 Hi   Please assist with scheduling surveillance EUS for this pt with gastric cancer in Oct 2023.   Thank you.

## 2023-07-13 RX ORDER — SEMAGLUTIDE 1.34 MG/ML
INJECTION, SOLUTION SUBCUTANEOUS
Qty: 9 EACH | Refills: 11 | Status: SHIPPED | OUTPATIENT
Start: 2023-07-13

## 2023-07-25 ENCOUNTER — PES CALL (OUTPATIENT)
Dept: ADMINISTRATIVE | Facility: CLINIC | Age: 73
End: 2023-07-25
Payer: MEDICARE

## 2023-07-25 DIAGNOSIS — M17.12 PRIMARY OSTEOARTHRITIS OF LEFT KNEE: ICD-10-CM

## 2023-07-25 DIAGNOSIS — C16.9 GASTRIC ADENOCARCINOMA: ICD-10-CM

## 2023-07-25 DIAGNOSIS — G89.4 CHRONIC PAIN DISORDER: Primary | ICD-10-CM

## 2023-07-25 DIAGNOSIS — M25.562 CHRONIC PAIN OF LEFT KNEE: ICD-10-CM

## 2023-07-25 DIAGNOSIS — G89.29 CHRONIC PAIN OF LEFT KNEE: ICD-10-CM

## 2023-07-25 RX ORDER — OXYCODONE AND ACETAMINOPHEN 5; 325 MG/1; MG/1
1 TABLET ORAL EVERY 8 HOURS PRN
Qty: 90 TABLET | Refills: 0 | Status: SHIPPED | OUTPATIENT
Start: 2023-07-28 | End: 2023-08-28 | Stop reason: SDUPTHER

## 2023-07-25 NOTE — TELEPHONE ENCOUNTER
Patient requesting refill on   oxyCODONE-acetaminophen (PERCOCET) 5-325 mg   Last office visit 05/01/23   shows last refill on 06/28/23  Patient does have a pain contract on file with [facility]  Patient last UDS 08/16/22 was consistent with current therapy     CODEINE  Not Detected  Not Detected    MORPHINE  Not Detected  Not Detected    6-ACETYLMORPHINE  Not Detected  Not Detected    OXYCODONE  Present  Present    NOROYXCODONE  Present  Present    OXYMORPHONE  Present  Not Detected    NOROXYMORPHONE  Present  Not Detected    HYDROCODONE  Not Detected  Not Detected    NORHYDROCODONE  Not Detected  Not Detected    HYDROMORPHONE  Not Detected  Not Detected    BUPRENORPHINE  Not Detected  Not Detected    NORUBPRENORPHINE  Not Detected  Not Detected    FENTANYL  Not Detected  Not Detected    NORFENTANYL  Not Detected  Not Detected    MEPERIDINE METABOLITE  Not Detected  Not Detected    TAPENTADOL  Not Detected  Not Detected    TAPENTADOL-O-SULF  Not Detected  Not Detected    METHADONE  Not Detected  Not Detected    TRAMADOL  Not Detected  Not Detected    AMPHETAMINE  Not Detected  Not Detected    METHAMPHETAMINE  Not Detected  Not Detected    MDMA- ECSTASY  Not Detected  Not Detected    MDA  Not Detected

## 2023-07-25 NOTE — TELEPHONE ENCOUNTER
----- Message from Deandra Barron sent at 7/25/2023  1:14 PM CDT -----  Regarding: Medication  Refill  Request              Reply in [facility]: NO      Please refill the medication listed below. Please call the patient    [phone] (K)        Medication       oxyCODONE-acetaminophen (PERCOCET) 5-325 mg per tablet                           Dispense:  90  Tablets                           Sig - Route: Take 1 tablet by mouth every 8 (eight) hours         Preferred Pharmacy:  [facility]   Phone: [phone]  Fax:  [phone]

## 2023-07-27 ENCOUNTER — CLINICAL SUPPORT (OUTPATIENT)
Dept: SMOKING CESSATION | Facility: CLINIC | Age: 73
End: 2023-07-27
Payer: COMMERCIAL

## 2023-07-27 ENCOUNTER — TELEPHONE (OUTPATIENT)
Dept: ORTHOPEDICS | Facility: CLINIC | Age: 73
End: 2023-07-27
Payer: MEDICARE

## 2023-07-27 DIAGNOSIS — F17.200 NICOTINE DEPENDENCE: ICD-10-CM

## 2023-07-27 PROCEDURE — 99999 PR PBB SHADOW E&M-EST. PATIENT-LVL I: CPT | Mod: PBBFAC,,,

## 2023-07-27 PROCEDURE — 99999 PR PBB SHADOW E&M-EST. PATIENT-LVL I: ICD-10-PCS | Mod: PBBFAC,,,

## 2023-07-27 PROCEDURE — 99402 PREV MED CNSL INDIV APPRX 30: CPT | Mod: S$GLB,,,

## 2023-07-27 PROCEDURE — 99402 PR PREVENT COUNSEL,INDIV,30 MIN: ICD-10-PCS | Mod: S$GLB,,,

## 2023-07-27 RX ORDER — IBUPROFEN 200 MG
1 TABLET ORAL DAILY
Qty: 28 PATCH | Refills: 0 | Status: SHIPPED | OUTPATIENT
Start: 2023-07-27 | End: 2023-08-24 | Stop reason: SDUPTHER

## 2023-07-27 NOTE — TELEPHONE ENCOUNTER
Called and spoke to Jamaica Santana she advised she had paper work to be signed for Dr Hensley. Advised pt she could drop off the paper work and once signed we would call her to have it picked up. Jamaica was pleasant and verbalized understandings.   ----- Message from Miryam Chen sent at 7/27/2023 11:47 AM CDT -----  Regarding: form signed  Contact: christiano Jamaica [phone]  PATIENTCALL     Pt sitter Ms Berumen is calling to speak with someone in provider office states that she has a transportation form that she needs to have filled out for [employer] is asking for a return call please call pt at  [phone] she also, states that she has an appt on Monday and would like to stop by and get this form signed

## 2023-07-27 NOTE — PROGRESS NOTES
Individual Follow-Up Form    7/27/2023    Quit Date:     Clinical Status of Patient: Outpatient    Continuing Medication: yes  Patches    Other Medications: none     Target Symptoms: Withdrawal and medication side effects. The following were  rated moderate (3) to severe (4) on TCRS:  Moderate (3): none  Severe (4): none    Comments: Patient was seen in clinic today and reports smoking 2-3 cigarettes per day. She remains on a tobacco cessation medication regimen of 21mg nicotine patch. Refill sent to pharmacy. No abnormal behaviors or mental changes reported at this time. Patient reports that she continues to cut back on cigarettes and lose weight because she is still eager to get her surgery. Reviewed strategies, habitual behavior, high risks situations, understanding urges and cravings, stress and relaxation with open discussion and additional interventions, Introduced lapses, relapses, understanding them and analyzing the situation of a lapse, conflict issues that may be linked to a lapse.  The patient will continue with  therapy sessions and medication monitoring by CTTS. Prescribed medication management will be by physician.     Diagnosis: F17.200    Next Visit: 8/24/2023

## 2023-07-31 ENCOUNTER — HOSPITAL ENCOUNTER (OUTPATIENT)
Dept: RADIOLOGY | Facility: HOSPITAL | Age: 73
Discharge: HOME | End: 2023-07-31
Attending: INTERNAL MEDICINE
Payer: MEDICARE

## 2023-07-31 VITALS — WEIGHT: 267 LBS | BODY MASS INDEX: 45.12 KG/M2

## 2023-07-31 DIAGNOSIS — Z12.31 ENCOUNTER FOR SCREENING MAMMOGRAM FOR MALIGNANT NEOPLASM OF BREAST: ICD-10-CM

## 2023-07-31 PROCEDURE — 77067 MAMMO DIGITAL SCREENING BILAT WITH TOMO: ICD-10-PCS | Mod: 26,GA,HCNC, | Performed by: RADIOLOGY

## 2023-07-31 PROCEDURE — 77067 SCR MAMMO BI INCL CAD: CPT | Mod: GA,TC,HCNC

## 2023-07-31 PROCEDURE — 77067 SCR MAMMO BI INCL CAD: CPT | Mod: 26,GA,HCNC, | Performed by: RADIOLOGY

## 2023-07-31 PROCEDURE — 77063 MAMMO DIGITAL SCREENING BILAT WITH TOMO: ICD-10-PCS | Mod: 26,GA,HCNC, | Performed by: RADIOLOGY

## 2023-07-31 PROCEDURE — 77063 BREAST TOMOSYNTHESIS BI: CPT | Mod: 26,GA,HCNC, | Performed by: RADIOLOGY

## 2023-08-04 DIAGNOSIS — E78.2 MIXED HYPERLIPIDEMIA: ICD-10-CM

## 2023-08-04 RX ORDER — ATORVASTATIN CALCIUM 20 MG/1
TABLET, FILM COATED ORAL
Qty: 90 TABLET | Refills: 2 | Status: SHIPPED | OUTPATIENT
Start: 2023-08-04

## 2023-08-04 NOTE — TELEPHONE ENCOUNTER
No care due was identified.  [facility] Embedded Care Due Messages. Reference number: [id number].   8/04/2023 10:42:19 AM CDT

## 2023-08-04 NOTE — TELEPHONE ENCOUNTER
Refill Routing Note     Refill Routing Note   Medication(s) are not appropriate for processing by [facility] for the following reason(s):      Required labs outdated    [facility] action(s):  Defer Care Due:  None identified            Appointments  past 12m or future 3m with PCP    Date Provider   Last Visit   5/29/2023 Lei Garcia MD   Next Visit   Visit date not found Lei Garcia MD   ED visits in past 90 days: 0        Note composed:12:30 PM 08/04/2023

## 2023-08-09 ENCOUNTER — TELEPHONE (OUTPATIENT)
Dept: ADMINISTRATIVE | Facility: OTHER | Age: 73
End: 2023-08-09
Payer: MEDICARE

## 2023-08-10 ENCOUNTER — TELEPHONE (OUTPATIENT)
Dept: ADMINISTRATIVE | Facility: OTHER | Age: 73
End: 2023-08-10
Payer: MEDICARE

## 2023-08-10 ENCOUNTER — TELEPHONE (OUTPATIENT)
Dept: PAIN MEDICINE | Facility: CLINIC | Age: 73
End: 2023-08-10
Payer: MEDICARE

## 2023-08-10 ENCOUNTER — TELEPHONE (OUTPATIENT)
Dept: ENDOSCOPY | Facility: HOSPITAL | Age: 73
End: 2023-08-10
Payer: MEDICARE

## 2023-08-10 DIAGNOSIS — M25.562 CHRONIC PAIN OF LEFT KNEE: Primary | ICD-10-CM

## 2023-08-10 DIAGNOSIS — G89.29 CHRONIC PAIN OF LEFT KNEE: Primary | ICD-10-CM

## 2023-08-10 NOTE — TELEPHONE ENCOUNTER
----- Message from Danay Crocker sent at 8/10/2023  3:53 PM CDT -----  Pt. will like another Left Knee RFA  with Dr. Clark, last order placed 11/2022.

## 2023-08-10 NOTE — TELEPHONE ENCOUNTER
Staff contacted pt to tell her that she missed a call from Danay and gave her Danay's number to call back

## 2023-08-10 NOTE — TELEPHONE ENCOUNTER
----- Message from Irina Torrez sent at 8/10/2023  8:21 AM CDT -----  Regarding: returning call  Name of caller: Ca       What is the requesting detail: Pt is returning a call. Please advise       Can the clinic reply by [facility]:       What number to call back:[phone]

## 2023-08-10 NOTE — TELEPHONE ENCOUNTER
Order placed, please let patient know she will need a visit prior to next refill of pain meds though.

## 2023-08-15 ENCOUNTER — TELEPHONE (OUTPATIENT)
Dept: PAIN MEDICINE | Facility: CLINIC | Age: 73
End: 2023-08-15
Payer: MEDICARE

## 2023-08-15 ENCOUNTER — TELEPHONE (OUTPATIENT)
Dept: ADMINISTRATIVE | Facility: OTHER | Age: 73
End: 2023-08-15
Payer: MEDICARE

## 2023-08-15 NOTE — TELEPHONE ENCOUNTER
----- Message from Danay Crocker sent at 8/15/2023  3:36 PM CDT -----  Dr. Clark you stated she can have her procedure at [facility] pt. Trying to get a sooner date.    From: Tevin Clark MD  Sent: 8/15/2023   1:35 PM CDT  To: Danay Crocker; Glenda Triana, RN    Sure, we can schedule it at [place].           ----- Message -----  From: Tevin Clark MD  Sent: 8/15/2023   3:02 PM CDT  To: Danay Crocker; Rebecca Wu MA; #    She is [employer] patient.    Rosa,    Can someone please call the patient?      ----- Message -----  From: Danay Crocker  Sent: 8/15/2023   1:59 PM CDT  To: Glenda Triana, GLEN; Tevin Clark MD    Thank you,  please ask staff to reach out to pt.   ----- Message -----  From: Tevin Clark MD  Sent: 8/15/2023   1:35 PM CDT  To: Danay Crocker; Glenda Triana, RN    Sure, we can schedule it at [place].      ----- Message -----  From: Danay Crocker  Sent: 8/15/2023  12:46 PM CDT  To: Tevin Clark MD    Pt. Will like to have procedure at [facility] stating she's in a lot of pain, LEFT KNEE RFA

## 2023-08-15 NOTE — TELEPHONE ENCOUNTER
Staff called the pt back in regards to her message about scheduling her at the [place] location. Staff left a vm with the [place] location number attached.

## 2023-08-15 NOTE — TELEPHONE ENCOUNTER
----- Message from Danay Crocker sent at 8/15/2023  3:36 PM CDT -----  Dr. Clark you stated she can have her procedure at [facility] pt. Trying to get a sooner date.    From: Tevin Clark MD  Sent: 8/15/2023   1:35 PM CDT  To: Danay Crocker; Glenda Triana, RN    Sure, we can schedule it at [place].           ----- Message -----  From: Tevin Clark MD  Sent: 8/15/2023   3:02 PM CDT  To: Danay Crocker; Rebecca Wu MA; #    She is [employer] patient.    Rosa,    Can someone please call the patient?      ----- Message -----  From: Danay Crocker  Sent: 8/15/2023   1:59 PM CDT  To: Glenda Triana, GLEN; Tevin Clark MD    Thank you,  please ask staff to reach out to pt.   ----- Message -----  From: Tevin Clark MD  Sent: 8/15/2023   1:35 PM CDT  To: Danay Crocker; Glenda Triana, RN    Sure, we can schedule it at [place].      ----- Message -----  From: Danay Crocker  Sent: 8/15/2023  12:46 PM CDT  To: Tevin Clark MD    Pt. Will like to have procedure at [facility] stating she's in a lot of pain, LEFT KNEE RFA

## 2023-08-23 ENCOUNTER — TELEPHONE (OUTPATIENT)
Dept: ENDOSCOPY | Facility: HOSPITAL | Age: 73
End: 2023-08-23
Payer: MEDICARE

## 2023-08-24 ENCOUNTER — TELEPHONE (OUTPATIENT)
Dept: PAIN MEDICINE | Facility: CLINIC | Age: 73
End: 2023-08-24
Payer: MEDICARE

## 2023-08-24 ENCOUNTER — CLINICAL SUPPORT (OUTPATIENT)
Dept: SMOKING CESSATION | Facility: CLINIC | Age: 73
End: 2023-08-24
Payer: COMMERCIAL

## 2023-08-24 DIAGNOSIS — M25.562 CHRONIC PAIN OF LEFT KNEE: Primary | ICD-10-CM

## 2023-08-24 DIAGNOSIS — F17.200 NICOTINE DEPENDENCE: ICD-10-CM

## 2023-08-24 DIAGNOSIS — G89.29 CHRONIC PAIN OF LEFT KNEE: Primary | ICD-10-CM

## 2023-08-24 PROCEDURE — 99999 PR PBB SHADOW E&M-EST. PATIENT-LVL I: CPT | Mod: PBBFAC,,,

## 2023-08-24 PROCEDURE — 99999 PR PBB SHADOW E&M-EST. PATIENT-LVL I: ICD-10-PCS | Mod: PBBFAC,,,

## 2023-08-24 PROCEDURE — 99403 PREV MED CNSL INDIV APPRX 45: CPT | Mod: S$GLB,,,

## 2023-08-24 PROCEDURE — 99403 PR PREVENT COUNSEL,INDIV,45 MIN: ICD-10-PCS | Mod: S$GLB,,,

## 2023-08-24 RX ORDER — IBUPROFEN 200 MG
1 TABLET ORAL DAILY
Qty: 28 PATCH | Refills: 0 | Status: SHIPPED | OUTPATIENT
Start: 2023-08-24 | End: 2024-01-04 | Stop reason: CLARIF

## 2023-08-24 NOTE — PROGRESS NOTES
Individual Follow-Up Form    8/24/2023    Quit Date: 8/17/2023    Clinical Status of Patient: Outpatient    Continuing Medication: yes  Patches    Other Medications: none     Target Symptoms: Withdrawal and medication side effects. The following were  rated moderate (3) to severe (4) on TCRS:  Moderate (3):  none  Severe (4): none    Comments: Spoke with patient for a length of time over the telephone and she reports being tobacco free for the last week. Commended patient on her hard work and dedication to this quit attempt. She remains on a tobacco cessation medication regimen of 21mg nicotine patch. Refill sent to pharmacy. No abnormal behaviors or mental changes reported at this time. Patient states that she has two more patches left and will run out before she can  her refill. Discussed things that patient can do fill time to refrain from smoking. Patient states that she desires her surgery so much that she will not have a cigarette. She hopes that she has loss enough weight to qualify for her surgery, she reports that her clothing are fitting loosely and she has no appetite. Discussed other tips and strategies to assist with remaining quit. The patient will continue with  therapy sessions and medication monitoring by CTTS. Prescribed medication management will be by physician.     Diagnosis: F17.200    Next Visit: 2 weeks

## 2023-08-25 ENCOUNTER — TELEPHONE (OUTPATIENT)
Dept: PAIN MEDICINE | Facility: CLINIC | Age: 73
End: 2023-08-25
Payer: MEDICARE

## 2023-08-28 ENCOUNTER — TELEPHONE (OUTPATIENT)
Dept: PAIN MEDICINE | Facility: CLINIC | Age: 73
End: 2023-08-28
Payer: MEDICARE

## 2023-08-28 ENCOUNTER — OFFICE VISIT (OUTPATIENT)
Dept: PAIN MEDICINE | Facility: CLINIC | Age: 73
End: 2023-08-28
Payer: MEDICARE

## 2023-08-28 DIAGNOSIS — G89.4 CHRONIC PAIN DISORDER: ICD-10-CM

## 2023-08-28 DIAGNOSIS — M17.12 PRIMARY OSTEOARTHRITIS OF LEFT KNEE: Primary | ICD-10-CM

## 2023-08-28 DIAGNOSIS — G89.29 CHRONIC PAIN OF LEFT KNEE: ICD-10-CM

## 2023-08-28 DIAGNOSIS — M25.562 CHRONIC PAIN OF LEFT KNEE: ICD-10-CM

## 2023-08-28 DIAGNOSIS — Z79.891 ENCOUNTER FOR MONITORING OPIOID MAINTENANCE THERAPY: ICD-10-CM

## 2023-08-28 DIAGNOSIS — C16.9 GASTRIC ADENOCARCINOMA: ICD-10-CM

## 2023-08-28 DIAGNOSIS — Z51.81 ENCOUNTER FOR MONITORING OPIOID MAINTENANCE THERAPY: ICD-10-CM

## 2023-08-28 PROCEDURE — 99214 OFFICE O/P EST MOD 30 MIN: CPT | Mod: HCNC,95,, | Performed by: ANESTHESIOLOGY

## 2023-08-28 PROCEDURE — 99214 PR OFFICE/OUTPT VISIT, EST, LEVL IV, 30-39 MIN: ICD-10-PCS | Mod: HCNC,95,, | Performed by: ANESTHESIOLOGY

## 2023-08-28 PROCEDURE — 4010F ACE/ARB THERAPY RXD/TAKEN: CPT | Mod: HCNC,CPTII,95, | Performed by: ANESTHESIOLOGY

## 2023-08-28 PROCEDURE — 4010F PR ACE/ARB THEARPY RXD/TAKEN: ICD-10-PCS | Mod: HCNC,CPTII,95, | Performed by: ANESTHESIOLOGY

## 2023-08-28 RX ORDER — OXYCODONE AND ACETAMINOPHEN 5; 325 MG/1; MG/1
1 TABLET ORAL EVERY 8 HOURS PRN
Qty: 90 TABLET | Refills: 0 | Status: SHIPPED | OUTPATIENT
Start: 2023-08-28 | End: 2023-09-28 | Stop reason: SDUPTHER

## 2023-08-28 NOTE — TELEPHONE ENCOUNTER
----- Message from Shruti Allison MA sent at 8/25/2023  5:00 PM CDT -----  Regarding: FW: refill    ----- Message -----  From: Shivani Baca  Sent: 8/24/2023   2:54 PM CDT  To: Eduardo Abarca Staff  Subject: refill                                           Who Called:LU RENAE [[id number]]      Refill or New Rx: Refill        RX Name and Strength  oxyCODONE-acetaminophen (PERCOCET) 5-325 mg per tablet    Is this a 30 day or 90 day RX: 30      Preferred Pharmacy with phone number:  [facility]   Phone:  [phone]  Fax:  [phone]            Local or Mail Order:  Local     Would the patient rather a call back or a response via [facility]?      best Call Back Number:[phone]      Additional Information:

## 2023-08-28 NOTE — PROGRESS NOTES
"Chronic patient Telemedicine Established Note (Follow up visit)  The patient location is: home  The chief complaint leading to consultation is: left knee pain    Visit type: audiovisual    Face to Face time with patient: 20 minutes  30 minutes of total time spent on the encounter, which includes face to face time and non-face to face time preparing to see the patient (eg, review of tests), Obtaining and/or reviewing separately obtained history, Documenting clinical information in the electronic or other health record, Independently interpreting results (not separately reported) and communicating results to the patient/family/caregiver, or Care coordination (not separately reported).       Each patient to whom he or she provides medical services by telemedicine is:  (1) informed of the relationship between the physician and patient and the respective role of any other health care provider with respect to management of the patient; and (2) notified that he or she may decline to receive medical services by telemedicine and may withdraw from such care at any time.    Notes:    Initial HPI:  Ca Coats is a 69 y.o. female who presents today with left knee pain. Her pain has been going on for "too long."  She was previously treated by Dr. Iyer at [facility].  She has injections [id number] with him.  These were helpful, but she does not know if the injections were steroid or viscosupplementation.   This pain is described in detail below.     Interval History (4/30/2018):  The patient returns to clinic today for follow up and records review. We did received records from [facility] including a MRI of her knee. Dr. Iyer's last office visit note from January 2017 does not include any previous injections. She continues to report bilateral knee pain, left greater than right. She describes this pain as constant and aching. This pain is worse with prolonged walking. She also report right shoulder pain with " prolonged overhead activity. She continues to take Percocet with benefit. She denies any other health changes.     Interval History (5/31/2018):  The patient returns to clinic for follow up. She is s/p left knee Synvisc One injection on 5/8/2018. She reports 35% relief of her knee pain. She continues to report bilateral knee pain, left greater than right. She describes this pain as constant and aching. Her pain is worse with prolonged walking and standing. She is scheduled to see Orthopedics at the  End of this month. She is also following up with Bariatrics to discuss the lap band procedure. She continues to take Percocet with benefit. She also uses Voltaren gel with benefit but this is expensive for her. She denies any other health changes.          Interval History (7/2/2018):  The patient returns to clinic today for follow up. She continues to report left knee pain that is constant, sharp, and aching in nature. Her pain is worse with prolonged walking and standing. She is scheduled for weight loss surgery in August. She continues follow up with orthopedics who does not recommend surgery at this time due to her BMI. She continues to take Percocet as needed with benefit. She denies any other health changes.      Interval History (7/19/2018):  The patient returns to clinic today for follow up. She is s/p left genicular nerve block on 7/10/2018. She reports 80% relief of her left knee pain. She reports that she was able to walk for longer distances (3 blocks) after the block. Her pain has returned to baseline. She continues to report left knee pain that is constant, sharp, and aching in nature. Her pain is worse with prolonged walking and standing. She denies any other health changes.      Interval History (8/21/2018):  The patient returns to clinic today for follow up. She is s/p left genicular cooled RFA on 7/31/2018. She reports 60% relief of her left knee pain. She reports intermittent knee pain that is sore and  "aching in nature. She continues to perform a home exercise routine. She is actively trying to lose weight. She continues to follow up with Bariatric Surgery at [facility]. She is considering weight loss surgery. She continues to use compound cream with benefit. She denies any other health changes.      Interval History (11/21/2018):  The patient returns to clinic today for follow up. She reports increased left knee pain this past week. She reports 2 episodes where her knee has "locked" up on her while walking. She describes her knee pain as sore and aching. She is actively trying to lose weight and quit smoking. She continues to use the compound cream with benefit. She denies any other health changes.      Interval History (1/9/2019):  The patient returns to clinic today for follow up. She is s/p left knee steroid injection on 12/21/2018. She reports one day of relief. She continues to report left knee pain that is constant, sharp, and aching in nature. Her pain is worse with standing and walking. She reports that her knee "locks" up on her while walking. She often feels as though she is going to fall. She is scheduled for bariatric weight loss surgery in February at [facility]. She denies any other health changes.      Interval History (2/28/2019):  The patient returns to clinic today for follow up. She is s/p Euflexxa series completed on 1/30/2019. She reports that she able to stand for longer periods of time since the procedure. She continues to report left knee pain that is sore and aching in nature. Her pain is worse with prolonged standing and walking. She was previously scheduled for weight loss surgery but reports this was canceled. She is scheduled for follow up next week about this. She denies any other health changes.      Interval History (4/12/2019):  The patient returns for f/u of left knee pain.  She is s/p left genicular cooled RFA with about 90% pain relief.  She has been able to walk easier.  She also " notices less stiffness in her knee.  She is planning on gastric surgery prior to knee replacement.  She had benefit with compounding cream but it is no longer covered by her insurance.  She does take Percocet from her PCP.  Her pain today is 7/10.     Interval History (7/11/19):  Patient reported to ED today for increased L knee pain and an episode of LLE weakness that lead to a near fall. Patient is s/p L genicular RFA in 3/19/19 that provided 90% relief for 2 months then came back. Pain is a crunchy pain, in left knee, that does not radiate, worse with movement, better with rest. Pain today is 9/10. She normally uses cane to ambulate but is currently using wheelchair at hospital. Denies any trauma ot the area, fever/chills, n/v, abdominal pain, or HA.     Interval History (8/8/2019):  She returns today for follow up s/p left knee genicular chemodenervation with phenol 7/19/19.  She reports that this procedure did not provide any improvement in her left knee symptoms, and she is still having significant difficulty with ambulation, still uses wheelchair for mobility. She also reports left lateral thigh and hip pain lateral upper thigh numbness. She has been admitted to [facility] but is not receiving PT there. She is still considering lap band surgery but has missed a followup appointment.      Interval History (9/4/2019):  The patient returns to clinic today for follow up. She continues to report intermittent left lateral thigh and hip pain that is tingling and numb in nature. She continues to report left knee pain. She continues to have difficulty ambulating due to pain. She is currently living in a SNF. She continues to take Gabapentin 300 mg BID. This was increased at last visit but not increased at the nursing home. She does report benefit with Percocet though it only last approximately 4-5 hours. She denies any adverse effects. She does present with a care attendant from the SNF today. Her pain today  is 10/10.     Interval History (10/4/2019):  The patient returns to clinic today for follow up and medication refill. She continues to report bilateral knee pain that is aching in nature. She reports intermittent left lateral thigh pain that is tingling and shooting in nature. She reports that her pain has been improving since last visit. She has increased her activity. She continues to report benefit with Gabapentin. She continues to take Percocet as needed with benefit. She denies any other health changes. Her pain today is 0/10.     Interval History (1/6/2020):  The patient returns to clinic today for follow up. She reports increased left leg pain today that is tingling in nature. She continues to report bilateral knee pain, left greater than right. She describes this pain as constant, sharp, and aching. She is no longer living at the nursing facility. She has increased her home exercise routine. She is actively trying to quit smoking in order to move forward with bariatric surgery. She continues to report benefit with current medication regimen. She takes Gabapentin and Percocet with benefit and without adverse effect. She denies any other health changes. Her pain today is 7/10.     Interval History (4/8/2020):  She returns today for follow up via audio.  She reports that the methocarbamol is not helping.  She continues to have a left-sided low back pain that is bothering her.  Her left knee continues to hurt.  Her right knee is hurting a little as well.  Percocet continues to help, but it is not lasting long enough.     Interval History (5/1/2020):  The patient returns for audio visit today for follow up. She continues to report bilateral knee pain, left greater than right. Her pain is worse with prolonged standing and walking. She reports that her back pain has improved. She continues to report benefit with current medication regimen. She continues to take Gabapentin and Percocet with benefit and without  adverse effects. She denies any other health changes. Her pain today is 8/10.     Interval History (6/2/2020):  The patient returns to clinic today via audio visit for follow up of knee pain. She reports increased left knee pain in the last week. Her pain is worse with standing and walking. She feels as though she will fall when standing. She is currently using ice, heat, and OTC lidocaine patches with limited relief. She continues to take Gabapentin and Percocet with some benefit. She denies any adverse effects. She denies any other health changes. Her pain today is 10/10.     Interval History 7/8/2020:  Ca Coats presents to the clinic for a follow-up appointment for follow up after 6/19/20 RFA Since the last visit, Ca Coats states the pain has been persistant. Current pain intensity is 10/10.    Interval History 8/11/2020:  The patient returns to clinic today for follow up of knee pain. She is s/p left knee Orthovisc injection on 7/28/2020. She reports limited relief at this time. She continues to report left knee pain, especially with standing and walking. She feels as though her knee will buckle. She has recently quit smoking. She is actively trying to lose weight. She was recently started on Ozempic per her PCP. She is following her diet plan. She continues to take Percocet with benefit but reports that it does not last. She denies any adverse effects with this medication. She denies any other health changes. Her pain today is 8/10.    Interval History 9/3/2020:  The patient returns to clinic today for follow up of knee pain. She reports some relief with left knee Orthovisc injection in July. She continues to report left knee pain that is sharp in nature. This pain is worse with standing and walking. She feels as though her knee will give out with prolonged standing. She is actively trying to lose weight but is frustrated with lack of progress. She is following her diet plan. She  continues to Percocet with benefit but it does not last. She denies any adverse effects. She denies any other health changes. Her pain today is 7/10.    Interval History 12/1/2020:  The patient returns to clinic today for follow up of knee pain. She reports increased left knee pain over the last few weeks. She also reports that her left knee feels weaker over the last month. She feels as thought it will give out with standing and walking. She would like to repeat RFA as she feels this helps her feel more stable and decreases her pain. She continues to take Percocet with some benefit but asks if this can be stronger. She denies any adverse effects with this medication. She continues to follow up with Bariatrics. She continues to follow a diet plan and take Ozempic. She denies any other health changes. Her pain today is 10/10.    Interval History 3/24/2021:  The patient returns to clinic today for follow up of knee pain. She is s/p left genicular RFA on 1/22/2021. She reports some benefit of her left knee pain. She is able to stand and walking for longer periods of time. She continues to report left knee pain. Since last visit, she had angiogram that was normal. She has discontinued anticoagulation. She also had a recent EGD which she is awaiting the results. She continues to see Bariatrics. She is actively trying to lose weight. She continues to take Gabapentin with benefit. She continues to take Percocet with benefit and without adverse effects. She denies any other health changes. Her pain today is 0/10.    Interval History 6/1/2021:  The patient returns to clinic today for follow up of knee pain. She reports increased left knee pain over the last two weeks. She has increased pain with standing and walking. She states that she can feel the bones rubbing together. She continues to follow up with Bariatrics. She is actively trying to lose weight. She continues to follow up with GI. She is hoping to pursue gastric  bypass. She continues to take Gabapentin with benefit. She continues to take Percocet as needed with benefit and without adverse effects. She denies any other health changes. Her pain today is 6/10.    Interval History 7/27/2021:  DARCY Coats presents to the clinic for a follow-up appointment. Since the last visit, DARCY Coats states the pain has been worsening. Current pain intensity is 7/10. Patient had an RFA in the left knee in January 2021. She had really good relief until about June. She was given an hyaulorinic injection at that time. She had good relief for a few weeks but now she is back to the same amount of pain as before that injection. She is having pain with standing and walking. She is continuing to take percocet and it completely gets rid of her pain      Of note, patient was found to have gastric cancer. She is currently on chemotherapy. The plan is to continue chemo until the cancer can be surgically removed.     Interval History 9/14/2021:  The patient returns to clinic today for follow up of knee pain via audio visit. She is s/p left genicular RFA on 7/30/2021. She reports 50% relief of her knee pain. She continues to report left knee pain with prolonged standing and walking. Since last visit, she has developed a breast abscess. This was drained yesterday. She is on antibiotics. She continues to undergo chemotherapy for gastric cancer. She continues to take Gabapentin with benefit. She continues to take Percocet with benefit and without adverse effects. She denies any other health changes. Her pain today is 2/10.     Interval History 11/9/2021:  The patient returns to clinic today for follow up of knee pain. She reports increased left knee pain, worse with standing and walking. Since last visit, she was admitted to the hospital for diarrhea and dehydration. She continues to undergo chemotherapy treatment for gastric cancer. She continues to take Gabapentin with benefit. She continues  to take Percocet as needed with benefit and without adverse effects. She denies any other health changes. Her pain today is 7/10.       Interval History 12/28/2021:  The patient returns to clinic today for follow up of knee pain. She is s/p left knee Monovisc injection on 12/14/2021. She reports 70% relief of her left knee pain. She continues to report intermittent left knee pain, worse with standing and walking. She reports bilateral feet swelling, left greater than right. She has not seen her PCP. She has completed chemotherapy. She is scheduled for EGD in January to monitor tumor size with possible resection. She will likely undergo chemotherapy again after the scope. She continues to take Gabapentin with benefit. She continues to take Percocet as needed with benefit and without adverse effects. She denies any other health changes. Her pain today is 0/10.    Interval History 3/21/2022:  The patient returns to clinic today for follow up of knee pain. She report increased left knee pain, worse with prolonged standing and walking. She did have repeat EGD with biopsies done. She is currently undergoing chemotherapy and radiation. She continues to take Percocet as needed with benefit and without adverse effects. She denies any other health changes. Her pain today is 0/10.    Interval History 5/16/2022:  The patient returns to clinic today for follow up of knee pain. She is s/p left genicular RFA on 4/22/2022. She reports 80% relief of her knee pain. She continues to report intermittent left knee pain. Her pain is worse with prolonged activity. She has completed radiation and chemotherapy. She is awaiting further testing to determine plan. She continues to take Percocet as needed with benefit and without adverse effects. She denies any other health changes. Her pain today is 2/10.    Interval History 8/16/2022:  The patient returns to clinic today for follow up of knee pain. She continues to report left knee pain. Her  pain is much improved since RFA. Her pain is worse with prolonged standing and walking. She reports intermittent right lower leg pain, below her knee. She continues to follow up with Hem/Onc for gastric cancer. She continues to report benefit with Percocet. This allows her to perform her ADLs. She denies any adverse effects. She denies any other health changes. Her pain today is 0/10.    Interval History 11/16/2022:  The patient returns to clinic today for follow up of knee pain. She reports increased left knee pain over the last few weeks. Her pain is worse with prolonged standing, walking, and activity. She denies any right knee pain. She continues to follow up with Hem/Onc. She continues to report benefit with current medication regimen. She continues to take Gabapentin. She continues to take Percocet as needed with benefit and without adverse effects. She denies any other health changes. Her pain today is 6/10.    Interval History 1/13/2023:  The patient returns to clinic today for follow up of knee pain. She is s/p left genicular RFA on 12/23/2022. She reports 75-80% relief of her left knee pain. She reports intermittent knee pain, worse with prolonged activity. She continues to take Gabapentin. She continues to take Percocet as needed with benefit. She denies any adverse effects. She denies any other health changes. Her pain today is 0/10.    Interval History 5/1/2023:  The patient returns to clinic today for follow up of knee pain. She continues to report left knee pain. Her pain is worse with prolonged activity. She is taking Gabapentin. She also takes Percocet as needed with benefit and without adverse effects. She denies any other health changes. Her pain today is 0/10.    Interval History 8/28/23 (Virtual/Dr. Olson)  72 year old female returns today in follow up. Previously a patient of Dr. Thacker but has since been a patient of Dr. Clark. Today is the first visit with Dr. Olson. Patient reports  left knee pain that is persistent. She had left knee genicular nerve RFA with Dr. Clark 12/23/22. She reports 75-80% relief of her left knee pain for 7 months. Pain has gradually returned of similar character, location, description. No recent falls. No new weakness or sensation changes. We reviewed imaging which is consistent with medial joint space narrowing, which is where her pain is. She would like to repeat the RFA.       Pain Disability Index Review:      5/1/2023     9:54 AM 1/13/2023    10:29 AM 11/16/2022     9:47 AM   Last 3 PDI Scores   Pain Disability Index (PDI) 20 14 27         Physical Therapy: Yes, she did this in 2018 for one month (twice a week). She does HEP (stretching and ROM in the morning)      Pain Medications:    Current:  Gabapentin, tylenol 500mg Q6 prn, Percocet 5/325 mg TID PRN    Opioid Contract: yes     report:  Reviewed and consistent with medication use as prescribed.    Pain Procedures:   5/8/2018- Left knee Synvisc One injection  7/10/2018- Left genicular nerve block  7/31/2018- Left genicular cooled RFA  12/21/2018- Left knee steroid injection  1/31/2019- Left knee Euflexxa series  3/19/2019 Left genicular cooled RFA- 90% relief  7/19/19- Left knee genicular phenol chemodenervation-  6/19/2020- Left genicular RFA  1/22/2021- Left genicular RFA  6/2021- Left knee Orthovisc injection  7/30/2021- Left genicular RFA  12/14/2021- Left knee Monovisc injection  4/22/2022- Left genicular RFA  12/23/2022- Left genicular RFA- 75-80% relief    Physical Therapy//Home Exercise: yes, she did this in 2018 for one month (twice a week). She does HEP (stretching and ROM in the morning)    Imaging:   MRI Left Knee 7/21/2017 (in media):  The medial meniscus is intact. The lateral meniscus is intact.      A chronic complete ACL tear is noted. The posterior cruciate ligament is intact. The medial and lateral retinacula are intact. The medial collateral ligament is intact. The lateral collateral  ligament in intact. The posterolateral corner structures are intact.      There is full thickness fissuring of the central aspect medial femoral cartilage. The lateral tibiofemoral compartment cartilage is intact. There is broad thickness defect within the central trochlear cartilage. There is mild lateral patellofemoral cartilage thinning and regional full thickness loss of the central superior patellar cartilage.      A small effusion is present. There is trace infrapatellar fluid. Tiny popliteal cyst is noted. Subtle focal marrow edema is seen within the posterior tibial plateau. The bone marrow signal is heterogeneous likely reflecting marrow reconversion.      There is trace pes anserine bursitis. The tendons are otherwise normal.      Grade 2 fatty streaking of the semimembranosus and vastus lateralis muscles are noted. There is mild prepatellar edema.      MRI Right Shoulder 7/21/2017 (in media):   There is a complete tear of the supraspinatus tendon with retraction to the glenohumeral joint. There is partial tearing of the anterior infraspinatus articular surface. Mild subscapularis tendinosis is noted. The teres minor is intact. A small amount of subacromial/subdeltoid fluid is noted. The proximal long head biceps tendon is poorly visualized proximally although intact distally.      Degenerative tearing of the anterosuperior through posterosuperior labrum is noted. There is moderate to severe superior glenoid cartilage thinning with subchondral degenerative changes. The glenohumeral ligaments appear grossly intact.      An os acromiale is seen with fluid and osteophytosis at its junction with the base of the acromion. There is moderate superior subluxation of the AC joint with mild osteophytosis.      Small effusion with subcoracoid extension is noted.      There is grade 2/3 fatty atrophy of the supraspinatus muscle, grade 2 of the infraspinatus and subscapularis. Heterogeneous signal is seen throughout  the bone marrow likely reflecting marrow reconversion.        Allergies: Review of patient's allergies indicates:  No Known Allergies    Current Medications:   Current Outpatient Medications   Medication Sig Dispense Refill    acetaminophen (TYLENOL) 500 MG tablet       amLODIPine (NORVASC) 10 MG tablet TAKE 1 TABLET ONE TIME DAILY 90 tablet 11    atorvastatin (LIPITOR) 20 MG tablet TAKE 1 TABLET EVERY DAY 90 tablet 2    B-complex with vitamin C (Z-BEC OR EQUIV) tablet Take 1 tablet by mouth once daily.       CALCIUM CITRATE-VITAMIN D2 ORAL Take 1 tablet by mouth once daily.       citalopram (CELEXA) 10 MG tablet TAKE 1 TABLET EVERY DAY 90 tablet 3    dextran 70-hypromellose (TEARS) ophthalmic solution Place 1 drop into the right eye every 6 (six) hours. 30 mL 0    ELIQUIS 5 mg Tab TAKE 1 TABLET TWICE DAILY 180 tablet 2    ferrous gluconate (FERGON) 324 MG tablet TAKE 1 TABLET (324 MG TOTAL) BY MOUTH DAILY WITH BREAKFAST. 90 tablet 11    furosemide (LASIX) 80 MG tablet TAKE 1 TABLET TWICE DAILY 180 tablet 3    gabapentin (NEURONTIN) 300 MG capsule Take 2 capsules (600 mg total) by mouth 3 (three) times daily. 540 capsule 2    ibuprofen (ADVIL,MOTRIN) 600 MG tablet Take 1 tablet (600 mg total) by mouth every 8 (eight) hours as needed for Pain. 10 tablet 0    lactulose (CHRONULAC) 10 gram/15 mL solution Take 30 mLs (20 g total) by mouth 2 (two) times daily as needed (constipation). 120 mL 1    LIDOcaine-prilocaine (EMLA) cream Apply topically as needed (prior to port access). 30 g 0    lisinopriL (PRINIVIL,ZESTRIL) 40 MG tablet TAKE 1 TABLET ONE TIME DAILY 90 tablet 11    multivitamin with minerals tablet Take 1 tablet by mouth once daily.       nicotine (NICODERM CQ) 21 mg/24 hr Place 1 patch onto the skin once daily. 28 patch 0    pantoprazole (PROTONIX) 40 MG tablet TAKE 1 TABLET EVERY DAY 90 tablet 11    semaglutide (OZEMPIC) 1 mg/dose (4 mg/3 mL) INJECT 1 MG INTO THE SKIN EVERY 7 DAYS. 9 each 11     No current  facility-administered medications for this visit.     Facility-Administered Medications Ordered in Other Visits   Medication Dose Route Frequency Provider Last Rate Last Admin    0.9%  NaCl infusion   Intravenous Continuous Tevin Clark MD   Stopped at 01/13/22 1055       REVIEW OF SYSTEMS:    GENERAL:  No weight loss, malaise or fevers.  HEENT:  Negative for frequent or significant headaches.  NECK:  Negative for lumps, goiter, pain and significant neck swelling.  RESPIRATORY:  Negative for cough, wheezing or shortness of breath. +LAURA  CARDIOVASCULAR:  Negative for chest pain, leg swelling or palpitations. HTN  GI:  Negative for abdominal discomfort, blood in stools or black stools or change in bowel habits. +GERD, gastric cancer actively in chemo  MUSCULOSKELETAL:  See HPI  SKIN:  Negative for lesions, rash, and itching.  PSYCH:  Positive for  mood disorder and recent psychosocial stressors. +sleep disturbance  HEMATOLOGY/LYMPHOLOGY:  Negative for prolonged bleeding, bruising easily or swollen nodes.  NEURO:   No history of headaches, syncope, paralysis, seizures or tremors.  ENDO: Thyroid disorder.   All other reviewed and negative other than HPI.    Past Medical History:  Past Medical History:   Diagnosis Date    YOUNG (acute kidney injury) 10/15/2021    Anemia     2018    Arthritis     BMI 60.0-69.9, adult     Cataract     Chronic diastolic congestive heart failure 1/27/2021    Depression     Dry eye syndrome     DVT of deep femoral vein, right 5/29/2023    Gastric adenocarcinoma 5/25/2021    GERD (gastroesophageal reflux disease)     Glaucoma suspect     History of gastric ulcer     History of psychiatric hospitalization     Hx of psychiatric care     Celexa, no recall of charted Effexor, Lexapro, Desyrel prescriptions    Hyperlipidemia     Hypertension     Hypothyroidism     Morbid obesity     Neuromuscular disorder     Sleep apnea     Thyroid disease        Past Surgical History:  Past Surgical History:    Procedure Laterality Date    APPENDECTOMY      CARDIAC SURGERY      CATARACT EXTRACTION W/  INTRAOCULAR LENS IMPLANT Bilateral     MFIOL OU    CORONARY ANGIOGRAPHY Bilateral 2/24/2021    Procedure: ANGIOGRAM, CORONARY ARTERY;  Surgeon: Cresencio Ridley MD;  Location: [facility] CATH LAB;  Service: Cardiology;  Laterality: Bilateral;  Covid test needed - ok per Danay SSCU. Pt. w/o transportation or family    ENDOSCOPIC ULTRASOUND OF UPPER GASTROINTESTINAL TRACT N/A 3/17/2021    Procedure: ULTRASOUND, UPPER GI TRACT, ENDOSCOPIC;  Surgeon: Gerald Anaya MD;  Location: [facility] SAM (2ND FLR);  Service: Endoscopy;  Laterality: N/A;  Pt unable to get a ride for swab. Will do rapid test at 8:30 - ttr    ENDOSCOPIC ULTRASOUND OF UPPER GASTROINTESTINAL TRACT N/A 1/13/2022    Procedure: ULTRASOUND, UPPER GI TRACT, ENDOSCOPIC;  Surgeon: Kevin Carballo MD;  Location: [facility] (2ND FLR);  Service: Endoscopy;  Laterality: N/A;  blood thinner approval received, see telephone encounter 1/7/22-BB  rapid  instructions given verbally and sent to address on file in pt's chart-BB    ENDOSCOPIC ULTRASOUND OF UPPER GASTROINTESTINAL TRACT N/A 10/11/2022    Procedure: ULTRASOUND, UPPER GI TRACT, ENDOSCOPIC;  Surgeon: Dequan Ridley MD;  Location: [facility] (2ND FLR);  Service: Endoscopy;  Laterality: N/A;    ESOPHAGOGASTRODUODENOSCOPY N/A 2/5/2021    Procedure: EGD (ESOPHAGOGASTRODUODENOSCOPY);  Surgeon: Matthew Hernadez MD;  Location: [facility] (2ND FLR);  Service: Endoscopy;  Laterality: N/A;  BMI-58    Wt: 361#     COVID test at [facility] on 2/2-GT    ESOPHAGOGASTRODUODENOSCOPY N/A 3/17/2021    Procedure: EGD (ESOPHAGOGASTRODUODENOSCOPY);  Surgeon: Gerald Anaya MD;  Location: [facility] ENDO (2ND FLR);  Service: Endoscopy;  Laterality: N/A;    ESOPHAGOGASTRODUODENOSCOPY N/A 5/25/2021    Procedure: EGD (ESOPHAGOGASTRODUODENOSCOPY);  Surgeon: Kevin Carballo MD;  Location: [facility] SAM (2ND FLR);  Service: Endoscopy;  Laterality: N/A;  ESD 2  hours  Full COVID vaccination on file. ttr    ESOPHAGOGASTRODUODENOSCOPY N/A 1/13/2022    Procedure: EGD (ESOPHAGOGASTRODUODENOSCOPY);  Surgeon: Kevin Carballo MD;  Location: [facility] ENDO (2ND FLR);  Service: Endoscopy;  Laterality: N/A;  blood thinner approval, see telephone encounter 1/7/22-BB  rapid  instructions given verbally and sent to address on file in pt's chart-BB    ESOPHAGOGASTRODUODENOSCOPY N/A 10/11/2022    Procedure: EGD (ESOPHAGOGASTRODUODENOSCOPY);  Surgeon: Dequan Ridley MD;  Location: [facility] ENDO (2ND FLR);  Service: Endoscopy;  Laterality: N/A;  She is do for EGD/EUS now. Personal history of gastric cancer. Ca Coats #[id number].   Thanks,   Kevin Fuentes MD    Pt is fully vaccinated-DS  9/14/22-Approval to hold Davian rec'd from Dr. Pelaez (see telephone encounter 9/14/22)-DS  9/14/22-Ins    EYE SURGERY      INJECTION OF ANESTHETIC AGENT AROUND NERVE Left 7/10/2018    Procedure: BLOCK, NERVE;  Surgeon: Samantha Vidal MD;  Location: [facility] PAIN MGT;  Service: Pain Management;  Laterality: Left;  Genicular     INJECTION OF ANESTHETIC AGENT AROUND NERVE Left 7/19/2019    Procedure: Block, Nerve NERVE BLOCK GENICULAR WITH PHENOL 6%;  Surgeon: Samantha Vidal MD;  Location: [facility] PAIN MGT;  Service: Pain Management;  Laterality: Left;  NEEDS CONSENT    INSERTION OF TUNNELED CENTRAL VENOUS CATHETER (CVC) WITH SUBCUTANEOUS PORT N/A 7/9/2021    Procedure: INSERTION, PORT-A-CATH;  Surgeon: Mario Paz MD;  Location: [facility] CATH LAB;  Service: Radiology;  Laterality: N/A;  PORT PLACEMENT    RADIOFREQUENCY ABLATION Left 6/19/2020    Procedure: RADIOFREQUENCY ABLATION LEFT GENICULAR;  Surgeon: Tevin Clark MD;  Location: [facility] PAIN MGT;  Service: Pain Management;  Laterality: Left;  Left Genicular RFA    RADIOFREQUENCY ABLATION Left 1/22/2021    Procedure: RADIOFREQUENCY ABLATION LEFT GENICULAR;  Surgeon: Tevin Clark MD;  Location: [facility] PAIN MGT;  Service: Pain Management;  Laterality:  Left;    RADIOFREQUENCY ABLATION Left 7/30/2021    Procedure: RADIOFREQUENCY ABLATION, GENICULAR COOLED NEED CONSENT;  Surgeon: Tevin Clark MD;  Location: [facility] PAIN MGT;  Service: Pain Management;  Laterality: Left;    RADIOFREQUENCY ABLATION Left 4/22/2022    Procedure: RADIOFREQUENCY ABLATION, GENICULAR COOLED , LEFT;  Surgeon: Tevin Clark MD;  Location: [facility] PAIN MGT;  Service: Pain Management;  Laterality: Left;    RADIOFREQUENCY ABLATION Left 12/23/2022    Procedure: RADIOFREQUENCY ABLATION LEFT GENICULAR COOLED CLEARED TO HOLD ELIQUIS 3 DAYS  NEEDS CONSENT;  Surgeon: Tevin Clark MD;  Location: [facility] PAIN MGT;  Service: Pain Management;  Laterality: Left;    TONSILLECTOMY         Family History:  Family History   Problem Relation Age of Onset    No Known Problems Mother     No Known Problems Father     Colon cancer Neg Hx     Esophageal cancer Neg Hx        Social History:  Social History     Socioeconomic History    Marital status:     Number of children: 2   Occupational History     Comment: retired  and cook   Tobacco Use    Smoking status: Some Days     Types: Cigarettes    Smokeless tobacco: Never    Tobacco comments:     currently smoking <5 cigarettes most days   Substance and Sexual Activity    Alcohol use: Yes     Comment: raely    Drug use: No    Sexual activity: Not Currently     Partners: Male   Social History Narrative    2 children (dtr in area, son in [place]) and she has 3 grandkids (in [place]),    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    [facility] [place] [facility]       OBJECTIVE: Physical Exam and Vital deferred due to virtual visit.     There were no vitals taken for this visit.    Prior PHYSICAL EXAMINATION:     General appearance: Well appearing, in no acute distress, alert and oriented x3.  Psych:  Mood and affect appropriate.  Skin: Skin color, texture, turgor normal, no rashes or lesions, in both upper and lower body.  Head/face:  Atraumatic,  normocephalic.   Pulm: Symmetric chest rise, no respiratory distress noted.   Extremities:  No deformities or skin discoloration. Good capillary refill. +2 pitting edema to bilateral feet.   Musculoskeletal: There is mild pain with palpation over medial and lateral joint line of left knee. Limited ROM with mild pain. Crepitus noted to left knee. Right knee maneuvers are negative. No atrophy or tone abnormalities are noted.  Neuro:   No loss of sensation is noted.  Gait: Antalgic- ambulates with wheelchair.     ASSESSMENT: 72 y.o. year old female with left knee pain, consistent with the followin. Primary osteoarthritis of left knee        2. Chronic pain disorder        3. Encounter for monitoring opioid maintenance therapy            Discussion: 72 year old female with persistent left knee pain 2/2 osteoarthritis. Prior patient of Dr. Thacker. Patient managed with viscosupplement and genicular RFA. Most recently Left genicular RFA with Dr. Clark 2022 with excellent relief.     OPIOID MANAGEMENT:  MME: 22.5  Risk:   : Reviewed today  Naloxone:   Utox Appropriate: 22  Violations: None  Contract:  2020    PLAN:     - Previous imaging reviewed today. Labs reviewed.     - Scheduled for repeat left genicular RFA with Dr. Clark. Order placed today.     - We can repeat left knee Monovisc injection as needed.     - Continue follow up with Oncology regarding gastric adenocarcinoma.     - Pain contract signed 2020.    - Continue Percocet 5/325 mg TID PRN. Refill sent today.    - The patient is here today for a refill of current pain medications and they believe these provide effective pain control and improvements in quality of life.  The patient notes no serious side effects, and feels the benefits outweigh the risks.  The patient was reminded of the pain contract that they signed previously as well as the risks and benefits of the medication including possible death.  The updated [place]  Board of Pharmacy prescription monitoring program was reviewed, and the patient has been found to be compliant with current treatment plan. Medication management provided by Dr. Clark.     - UDS next in person visit.     - Continue Gabapentin.     - I have stressed the importance of physical activity and a home exercise plan to help with pain and improve health.    - RTC in 3 months.     The above plan and management options were discussed at length with patient. Patient is in agreement with the above and verbalized understanding.    Lei Bacon MD  08/28/2023

## 2023-08-31 ENCOUNTER — TELEPHONE (OUTPATIENT)
Dept: PAIN MEDICINE | Facility: CLINIC | Age: 73
End: 2023-08-31
Payer: MEDICARE

## 2023-08-31 ENCOUNTER — OFFICE VISIT (OUTPATIENT)
Dept: ORTHOPEDICS | Facility: CLINIC | Age: 73
End: 2023-08-31
Payer: MEDICARE

## 2023-08-31 VITALS — HEIGHT: 64 IN | BODY MASS INDEX: 46.45 KG/M2 | WEIGHT: 272.06 LBS

## 2023-08-31 DIAGNOSIS — M17.12 PRIMARY OSTEOARTHRITIS OF LEFT KNEE: ICD-10-CM

## 2023-08-31 DIAGNOSIS — M17.12 PRIMARY OSTEOARTHRITIS OF LEFT KNEE: Primary | ICD-10-CM

## 2023-08-31 DIAGNOSIS — E66.01 MORBID OBESITY WITH BMI OF 45.0-49.9, ADULT: Primary | ICD-10-CM

## 2023-08-31 PROCEDURE — 4010F ACE/ARB THERAPY RXD/TAKEN: CPT | Mod: HCNC,CPTII,S$GLB, | Performed by: ORTHOPAEDIC SURGERY

## 2023-08-31 PROCEDURE — 1101F PT FALLS ASSESS-DOCD LE1/YR: CPT | Mod: HCNC,CPTII,S$GLB, | Performed by: ORTHOPAEDIC SURGERY

## 2023-08-31 PROCEDURE — 99213 OFFICE O/P EST LOW 20 MIN: CPT | Mod: HCNC,S$GLB,, | Performed by: ORTHOPAEDIC SURGERY

## 2023-08-31 PROCEDURE — 1125F PR PAIN SEVERITY QUANTIFIED, PAIN PRESENT: ICD-10-PCS | Mod: HCNC,CPTII,S$GLB, | Performed by: ORTHOPAEDIC SURGERY

## 2023-08-31 PROCEDURE — 3288F FALL RISK ASSESSMENT DOCD: CPT | Mod: HCNC,CPTII,S$GLB, | Performed by: ORTHOPAEDIC SURGERY

## 2023-08-31 PROCEDURE — 1125F AMNT PAIN NOTED PAIN PRSNT: CPT | Mod: HCNC,CPTII,S$GLB, | Performed by: ORTHOPAEDIC SURGERY

## 2023-08-31 PROCEDURE — 1159F PR MEDICATION LIST DOCUMENTED IN MEDICAL RECORD: ICD-10-PCS | Mod: HCNC,CPTII,S$GLB, | Performed by: ORTHOPAEDIC SURGERY

## 2023-08-31 PROCEDURE — 1159F MED LIST DOCD IN RCRD: CPT | Mod: HCNC,CPTII,S$GLB, | Performed by: ORTHOPAEDIC SURGERY

## 2023-08-31 PROCEDURE — 1160F RVW MEDS BY RX/DR IN RCRD: CPT | Mod: HCNC,CPTII,S$GLB, | Performed by: ORTHOPAEDIC SURGERY

## 2023-08-31 PROCEDURE — 99999 PR PBB SHADOW E&M-EST. PATIENT-LVL IV: CPT | Mod: PBBFAC,HCNC,, | Performed by: ORTHOPAEDIC SURGERY

## 2023-08-31 PROCEDURE — 99999 PR PBB SHADOW E&M-EST. PATIENT-LVL IV: ICD-10-PCS | Mod: PBBFAC,HCNC,, | Performed by: ORTHOPAEDIC SURGERY

## 2023-08-31 PROCEDURE — 3288F PR FALLS RISK ASSESSMENT DOCUMENTED: ICD-10-PCS | Mod: HCNC,CPTII,S$GLB, | Performed by: ORTHOPAEDIC SURGERY

## 2023-08-31 PROCEDURE — 1101F PR PT FALLS ASSESS DOC 0-1 FALLS W/OUT INJ PAST YR: ICD-10-PCS | Mod: HCNC,CPTII,S$GLB, | Performed by: ORTHOPAEDIC SURGERY

## 2023-08-31 PROCEDURE — 1160F PR REVIEW ALL MEDS BY PRESCRIBER/CLIN PHARMACIST DOCUMENTED: ICD-10-PCS | Mod: HCNC,CPTII,S$GLB, | Performed by: ORTHOPAEDIC SURGERY

## 2023-08-31 PROCEDURE — 3008F BODY MASS INDEX DOCD: CPT | Mod: HCNC,CPTII,S$GLB, | Performed by: ORTHOPAEDIC SURGERY

## 2023-08-31 PROCEDURE — 99213 PR OFFICE/OUTPT VISIT, EST, LEVL III, 20-29 MIN: ICD-10-PCS | Mod: HCNC,S$GLB,, | Performed by: ORTHOPAEDIC SURGERY

## 2023-08-31 PROCEDURE — 3008F PR BODY MASS INDEX (BMI) DOCUMENTED: ICD-10-PCS | Mod: HCNC,CPTII,S$GLB, | Performed by: ORTHOPAEDIC SURGERY

## 2023-08-31 PROCEDURE — 4010F PR ACE/ARB THEARPY RXD/TAKEN: ICD-10-PCS | Mod: HCNC,CPTII,S$GLB, | Performed by: ORTHOPAEDIC SURGERY

## 2023-08-31 NOTE — PROGRESS NOTES
Subjective:      Patient ID: Ca Coats is a 72 y.o. female.    Chief Complaint:   Pain of the Left Knee    HPI  Patient returns for follow-up evaluation of left knee pain.  She was seen clinic 3 months ago for surgical evaluation following failure of nonoperative treatment, including CSI's and RFA.  Reports her pain has remained stable over the past 3 months, and she continues to require cane for ambulation.  Patient was informed at prior visit she would need to work on weight loss before surgical treatment could be considered.  BMI of 46.7 at today's visit.        Objective:        Ortho/SPM Exam  There is is a mild varus deformity, which is partially passively correctable.  Active range of motion is 0-120 degrees.  Anterior crepitus with active extension.  Patellar mobility is limited.  Boggy synovitis without effusion.  Medial joint line tenderness predominates.  No instability to varus/valgus/Lachman's stressing.  No pain in the groin with flexion and internal rotation of the hip which is not limited.  Skin intact.  Distal neurovascular intact.  Flip test negative.         IMAGING:  Assessment:     1. Morbid obesity with BMI of 45.0-49.9, adult  Ambulatory referral/consult to Bariatric Medicine      2. Primary osteoarthritis of left knee             Plan:   We discussed the operative and non-operative management of her diagnosis. She is currently BMI 47, so we discussed that she needs to lose weight prior to any procedure.  Ambulatory referral to Bariatric Medicine was placed and patient will follow-up in 2 months.      The patient's pathophysiology was explained in detail with reference to x-rays, models, other visual aids as appropriate.  Treatment options were discussed in detail.  Questions were invited and answered to the patient's satisfaction.      Dwight Hensley MD  Orthopedic Surgery

## 2023-09-05 ENCOUNTER — TELEPHONE (OUTPATIENT)
Dept: SURGERY | Facility: CLINIC | Age: 73
End: 2023-09-05
Payer: MEDICARE

## 2023-09-05 ENCOUNTER — CLINICAL SUPPORT (OUTPATIENT)
Dept: SMOKING CESSATION | Facility: CLINIC | Age: 73
End: 2023-09-05
Payer: COMMERCIAL

## 2023-09-05 DIAGNOSIS — F17.200 NICOTINE DEPENDENCE: Primary | ICD-10-CM

## 2023-09-05 PROCEDURE — 99407 PR TOBACCO USE CESSATION INTENSIVE >10 MINUTES: ICD-10-PCS | Mod: S$GLB,,,

## 2023-09-05 PROCEDURE — 99407 BEHAV CHNG SMOKING > 10 MIN: CPT | Mod: S$GLB,,,

## 2023-09-05 NOTE — PROGRESS NOTES
Spoke with patient today in regard to smoking cessation progress for 3 month telephone follow up, she states tobacco free some days. Commended patient on the accomplishment thus far. Patient states not ready to return to the program at this time and will call when ready. Informed patient of benefit period, future follow ups, and contact information if any further help or support is needed. Will complete smart form for 3 month follow up on Quit attempt #4.

## 2023-09-06 ENCOUNTER — CLINICAL SUPPORT (OUTPATIENT)
Dept: SMOKING CESSATION | Facility: CLINIC | Age: 73
End: 2023-09-06
Payer: COMMERCIAL

## 2023-09-06 DIAGNOSIS — F17.200 NICOTINE DEPENDENCE: Primary | ICD-10-CM

## 2023-09-06 DIAGNOSIS — M25.562 CHRONIC PAIN OF LEFT KNEE: ICD-10-CM

## 2023-09-06 DIAGNOSIS — G89.29 CHRONIC PAIN OF LEFT KNEE: ICD-10-CM

## 2023-09-06 DIAGNOSIS — M17.12 PRIMARY OSTEOARTHRITIS OF LEFT KNEE: ICD-10-CM

## 2023-09-06 DIAGNOSIS — G89.4 CHRONIC PAIN DISORDER: ICD-10-CM

## 2023-09-06 PROCEDURE — 99402 PR PREVENT COUNSEL,INDIV,30 MIN: ICD-10-PCS | Mod: S$GLB,,,

## 2023-09-06 PROCEDURE — 99402 PREV MED CNSL INDIV APPRX 30: CPT | Mod: S$GLB,,,

## 2023-09-06 PROCEDURE — 99999 PR PBB SHADOW E&M-EST. PATIENT-LVL I: ICD-10-PCS | Mod: PBBFAC,,,

## 2023-09-06 PROCEDURE — 99999 PR PBB SHADOW E&M-EST. PATIENT-LVL I: CPT | Mod: PBBFAC,,,

## 2023-09-06 RX ORDER — GABAPENTIN 300 MG/1
600 CAPSULE ORAL 3 TIMES DAILY
Qty: 540 CAPSULE | Refills: 2 | Status: SHIPPED | OUTPATIENT
Start: 2023-09-06

## 2023-09-06 NOTE — TELEPHONE ENCOUNTER
----- Message from Franny Patel sent at 9/6/2023 11:21 AM CDT -----      Can the clinic reply in [facility]:        Please refill the medication(s) listed below. Please call the patient when the prescription(s) is ready for  at this phone number         Medication #1 gabapentin (NEURONTIN) 300 MG capsule    Medication #2       Preferred Pharmacy: [facility] - [place], [place] - [address] Fax [phone]

## 2023-09-06 NOTE — PROGRESS NOTES
Individual Follow-Up Form    9/6/2023    Quit Date: 8/17/2023    Clinical Status of Patient: Outpatient    Continuing Medication: yes  Patches    Other Medications: none     Target Symptoms: Withdrawal and medication side effects. The following were  rated moderate (3) to severe (4) on TCRS:  Moderate (3): none  Severe (4): none    Comments: Spoke with patient for a length of time over the telephone and she reports remaining tobacco free. She continues on a tobacco cessation medication regimen of 21mg nicotine patch. No refill needed at this time. She reports only wearing the patch when she is having a bad day or feeling upset about situations. Patient states that she will not be getting her surgery at this time because she did not lose the weight that they needed her to lose. Although she reports being upset about it, she states that she will continue to remain tobacco free because she still wants the surgery. Commended patient on her dedication to this quit. She reports being under the weather today and not feeling the best, recommended that patient contact her doctor as she states that she is having abnormal bleeding. Discussed other tips and strategies to assist with remaining tobacco free. Reminded patient of her benefits period, and patient states that she would like to continue with program. The patient will continue with  therapy sessions and medication monitoring by CTTS. Prescribed medication management will be by physician.     Diagnosis: F17.200    Next Visit: 10/4/2023

## 2023-09-08 ENCOUNTER — TELEPHONE (OUTPATIENT)
Dept: PAIN MEDICINE | Facility: CLINIC | Age: 73
End: 2023-09-08
Payer: MEDICARE

## 2023-09-08 ENCOUNTER — TELEPHONE (OUTPATIENT)
Dept: BARIATRICS | Facility: CLINIC | Age: 73
End: 2023-09-08
Payer: MEDICARE

## 2023-09-11 ENCOUNTER — TELEPHONE (OUTPATIENT)
Dept: PAIN MEDICINE | Facility: CLINIC | Age: 73
End: 2023-09-11
Payer: MEDICARE

## 2023-09-11 NOTE — TELEPHONE ENCOUNTER
----- Message from Tevin Clark MD sent at 9/11/2023  3:05 PM CDT -----  Can we try to do the P2P for this patient ASAP?

## 2023-09-11 NOTE — TELEPHONE ENCOUNTER
----- Message from Irina Torrez sent at 9/11/2023  1:50 PM CDT -----  Regarding: arrival time  Name of caller:  Ca       What is the requesting detail: pt is requesting a call back to find out the arrival time for her procedure.Please advise       Can the clinic reply by [facility]:       What number to call back:[phone]

## 2023-09-11 NOTE — TELEPHONE ENCOUNTER
----- Message from Irina Torrez sent at 9/11/2023  1:50 PM CDT -----  Regarding: arrival time  Name of caller:  Ca       What is the requesting detail: pt is requesting a call back to find out the arrival time for her procedure.Please advise       Can the clinic reply by [facility]:       What number to call back:[phone]

## 2023-09-12 ENCOUNTER — TELEPHONE (OUTPATIENT)
Dept: ORTHOPEDICS | Facility: CLINIC | Age: 73
End: 2023-09-12
Payer: MEDICARE

## 2023-09-12 DIAGNOSIS — G89.29 CHRONIC PAIN OF LEFT KNEE: ICD-10-CM

## 2023-09-12 DIAGNOSIS — G89.4 CHRONIC PAIN DISORDER: ICD-10-CM

## 2023-09-12 DIAGNOSIS — M25.562 CHRONIC PAIN OF LEFT KNEE: ICD-10-CM

## 2023-09-12 DIAGNOSIS — M17.12 PRIMARY OSTEOARTHRITIS OF LEFT KNEE: ICD-10-CM

## 2023-09-12 RX ORDER — GABAPENTIN 300 MG/1
600 CAPSULE ORAL 3 TIMES DAILY
Qty: 540 CAPSULE | Refills: 2 | OUTPATIENT
Start: 2023-09-12

## 2023-09-12 NOTE — TELEPHONE ENCOUNTER
"Called and spoke to pt regarding bill she received from Dr Olson's office I advised pt to call the Central billing office to answer any questions she may have.   ----- Message from Alen Almendarez sent at 9/12/2023 11:37 AM CDT -----  Regarding: Pt Advice  Contact: pt [phone]  Pt is calling to requesting call back, pt was referred to Bariatric, pt states that she was going to be scheduled for bariatric two days before appt with Dr. Hensley, "pt states she will not be able to loses 10 pounds before 10/31, if appt is for 10/27". Please call @[phone]    "

## 2023-09-12 NOTE — TELEPHONE ENCOUNTER
----- Message from Franny Patel sent at 9/11/2023  4:01 PM CDT -----      Can the clinic reply in [facility]:Y        Please refill the medication(s) listed below. Please call the patient when the prescription(s) is ready for  at this phone number         Medication #1 gabapentin (NEURONTIN) 300 MG capsule    Medication #2       Preferred Pharmacy: [facility] - [place], [place] - [address]

## 2023-09-12 NOTE — TELEPHONE ENCOUNTER
Chief Complaint   Patient presents with   • Rash     penis       Diana Goddard male 7 y.o. 5 m.o.    History was provided by the MOTHER    Chucky on penis      The following portions of the patient's history were reviewed and updated as appropriate: allergies, current medications, past family history, past medical history, past social history, past surgical history and problem list.    Current Outpatient Medications   Medication Sig Dispense Refill   • hydrocortisone 2.5 % cream Apply 1 application topically to the appropriate area as directed 2 (Two) Times a Day for 7 days. 20 g 1   • mupirocin (Bactroban) 2 % cream Apply 1 application topically to the appropriate area as directed 2 (Two) Times a Day for 7 days. 15 g 0   • ondansetron ODT (Zofran ODT) 4 MG disintegrating tablet Place 0.5 tablets on the tongue Every 8 (Eight) Hours As Needed for Nausea. 10 tablet 0     No current facility-administered medications for this visit.       No Known Allergies        Review of Systems   Constitutional: Negative for activity change, appetite change, fatigue and fever.   HENT: Negative for congestion, ear discharge, ear pain, hearing loss and sore throat.    Eyes: Negative for pain, discharge, redness and visual disturbance.   Respiratory: Negative for cough, wheezing and stridor.    Cardiovascular: Negative for chest pain and palpitations.   Gastrointestinal: Negative for abdominal pain, constipation, diarrhea, nausea, vomiting and GERD.   Genitourinary: Negative for dysuria, enuresis and frequency.   Musculoskeletal: Negative for arthralgias and myalgias.   Skin: Positive for rash.   Neurological: Negative for headache.   Hematological: Negative for adenopathy.   Psychiatric/Behavioral: Negative for behavioral problems.              Wt 17.2 kg (38 lb)     Physical Exam  Constitutional:       General: He is active.      Appearance: He is well-developed.   HENT:      Right Ear: Tympanic membrane normal.     Last seen on 08/28/2023     Left Ear: Tympanic membrane normal.      Nose: Nose normal.      Mouth/Throat:      Mouth: Mucous membranes are moist.      Pharynx: Oropharynx is clear.      Tonsils: No tonsillar exudate.   Eyes:      General:         Right eye: No discharge.         Left eye: No discharge.      Conjunctiva/sclera: Conjunctivae normal.   Cardiovascular:      Rate and Rhythm: Normal rate and regular rhythm.      Heart sounds: S1 normal and S2 normal. No murmur heard.  Pulmonary:      Effort: Pulmonary effort is normal. No respiratory distress or retractions.      Breath sounds: Normal breath sounds. No stridor. No wheezing, rhonchi or rales.   Abdominal:      General: Bowel sounds are normal. There is no distension.      Palpations: Abdomen is soft.      Tenderness: There is no abdominal tenderness. There is no guarding or rebound.   Musculoskeletal:         General: Normal range of motion.      Cervical back: Neck supple. No rigidity.      Comments: No scoliosis   Lymphadenopathy:      Cervical: No cervical adenopathy.   Skin:     General: Skin is warm and dry.      Findings: Rash present.      Comments: On penis   Neurological:      Mental Status: He is alert.           Assessment/Plan     Diagnoses and all orders for this visit:    1. Rash (Primary)    Other orders  -     hydrocortisone 2.5 % cream; Apply 1 application topically to the appropriate area as directed 2 (Two) Times a Day for 7 days.  Dispense: 20 g; Refill: 1  -     mupirocin (Bactroban) 2 % cream; Apply 1 application topically to the appropriate area as directed 2 (Two) Times a Day for 7 days.  Dispense: 15 g; Refill: 0          Return if symptoms worsen or fail to improve.

## 2023-09-13 ENCOUNTER — TELEPHONE (OUTPATIENT)
Dept: PAIN MEDICINE | Facility: CLINIC | Age: 73
End: 2023-09-13
Payer: MEDICARE

## 2023-09-13 DIAGNOSIS — I82.411 DVT OF DEEP FEMORAL VEIN, RIGHT: ICD-10-CM

## 2023-09-13 RX ORDER — APIXABAN 5 MG/1
TABLET, FILM COATED ORAL
Qty: 180 TABLET | Refills: 2 | Status: SHIPPED | OUTPATIENT
Start: 2023-09-13

## 2023-09-13 NOTE — TELEPHONE ENCOUNTER
[facility] [place] [employer]- only an appeal can be conducted.

## 2023-09-13 NOTE — TELEPHONE ENCOUNTER
----- Message from Rosita Gonzalez sent at 9/12/2023  1:30 PM CDT -----  Regarding: pt call back  Name of Who is Calling: LU RENAE [[id number]]        What is the request in detail: pt is still waiting for staff or provider to give pharmacy a call to prior authorize medication,would like a call back, please advise.   gabapentin (NEURONTIN) 300 MG capsule      Can the clinic reply by [facility]: no           What Number to Call Back if not in [facility]: [phone]          [facility] - [place], [place] - [address]  [address] [address]  Phone: [phone] Fax: [phone]

## 2023-09-13 NOTE — TELEPHONE ENCOUNTER
Contacted and spoke to Jayne with [facility], she reports she does have the prescription on file that was sent on 09/06/2023 and they shipped a prescription to her recently that she should be receiving.     Contacted and spoke to patient in regards to message- she reports that [facility] called her and told her that there was something wrong with the prescription that was sent in, if they call her back what is she suppose to say?     She was informed to have them contact the office.     Ms. Coats verbalized understanding.

## 2023-09-13 NOTE — TELEPHONE ENCOUNTER
----- Message from Shivani Baca sent at 9/13/2023 11:26 AM CDT -----  Regarding: refill  Name of Who is Calling:LU RENAE [[id number]]          What is the request in detail: pt is calling regarding rx gabapentin (NEURONTIN) 300 MG capsule/ [facility] got [facility] rx. She is asking if you can call it in again please.           Can the clinic reply by [facility]:          What Number to Call Back if not in [facility]:[phone]

## 2023-09-14 ENCOUNTER — TELEPHONE (OUTPATIENT)
Dept: BARIATRICS | Facility: CLINIC | Age: 73
End: 2023-09-14
Payer: MEDICARE

## 2023-09-14 NOTE — TELEPHONE ENCOUNTER
----- Message from Isabel Gannon RN sent at 9/14/2023 11:18 AM CDT -----  Regarding: FW: Appt Access  Contact: pt [phone]  Please call this pt. thx  ----- Message -----  From: Raisa Gonzalez  Sent: 9/14/2023  10:59 AM CDT  To: [facility] Clinical Staff  Subject: Appt Access                                      Pt calling to schedule her first  appt. Pls call

## 2023-09-14 NOTE — TELEPHONE ENCOUNTER
Contacted the patient. Attempted to schedule patient an appointment. She wanted to be seen before 10/31. We did not have any earlier appointment. Patient was not interested in scheduling.

## 2023-09-15 ENCOUNTER — TELEPHONE (OUTPATIENT)
Dept: PAIN MEDICINE | Facility: CLINIC | Age: 73
End: 2023-09-15
Payer: MEDICARE

## 2023-09-15 NOTE — TELEPHONE ENCOUNTER
----- Message from Arron Wu sent at 9/15/2023  3:38 PM CDT -----   Name of Who is Calling:     What is the request in detail:  request call back in reference to knee injection /authorization / patient  was denied due to no authorization on file  Please contact to further discuss and advise      Can the clinic reply by [facility]:     What Number to Call Back if not in [facility]: Hilary / valencia / [phone]   /please call in as a member not a provider and /call will go directly to the team / and then ask for Hilary

## 2023-09-26 ENCOUNTER — TELEPHONE (OUTPATIENT)
Dept: HEMATOLOGY/ONCOLOGY | Facility: CLINIC | Age: 73
End: 2023-09-26
Payer: MEDICARE

## 2023-09-26 ENCOUNTER — TELEPHONE (OUTPATIENT)
Dept: PAIN MEDICINE | Facility: CLINIC | Age: 73
End: 2023-09-26
Payer: MEDICARE

## 2023-09-26 NOTE — TELEPHONE ENCOUNTER
----- Message from Lamar Panchal MA sent at 9/26/2023 10:51 AM CDT -----  Name of Who is Calling:  LU RENAE [[id number]]              What is the request in detail: Pt is requesting a call back to discuss appointment that is scheduled. Please assist.                Can the clinic reply by [facility]: No                [facility] [phone]

## 2023-09-26 NOTE — TELEPHONE ENCOUNTER
----- Message from Yvonne Pineda sent at 9/26/2023 10:06 AM CDT -----  Regarding: Refill Request    Who Called: Partient        New Prescription or Refill : Refill    RX Name and Strength:   oxyCODONE-acetaminophen (PERCOCET) 5-325 mg per tablet      RX Name and Strength:         RX Name and Strength:      30 day or 90 day RX:     Preferred Pharmacy:[facility]    Would the patient rather a call back or a response via [facility]?    Best Call Back Number:  [phone]    Additional Information:

## 2023-09-26 NOTE — TELEPHONE ENCOUNTER
Returned call and spoke with Ms Coats. Ms Coats has been scheduled for her f/u with Dr Pelaez, labs prior. Ms Coats unable to come for her lab appointment two days prior due to transportation. She request her labs appointment be scheduled on the same day. Done. All appointments scheduled on 10/24.

## 2023-09-27 DIAGNOSIS — G89.29 CHRONIC PAIN OF LEFT KNEE: ICD-10-CM

## 2023-09-27 DIAGNOSIS — M25.562 CHRONIC PAIN OF LEFT KNEE: ICD-10-CM

## 2023-09-27 DIAGNOSIS — G89.4 CHRONIC PAIN DISORDER: ICD-10-CM

## 2023-09-27 DIAGNOSIS — M17.12 PRIMARY OSTEOARTHRITIS OF LEFT KNEE: ICD-10-CM

## 2023-09-27 DIAGNOSIS — C16.9 GASTRIC ADENOCARCINOMA: ICD-10-CM

## 2023-09-27 NOTE — TELEPHONE ENCOUNTER
8-20-23  Contract yes  : 8-28-23  Uds: 8/16/[birth year]  CODEINE  Not Detected  Not Detected    MORPHINE  Not Detected  Not Detected    6-ACETYLMORPHINE  Not Detected  Not Detected    OXYCODONE  Present  Present    NOROYXCODONE  Present  Present    OXYMORPHONE  Present  Not Detected    NOROXYMORPHONE  Present  Not Detected    HYDROCODONE  Not Detected  Not Detected    NORHYDROCODONE  Not Detected  Not Detected    HYDROMORPHONE  Not Detected  Not Detected    BUPRENORPHINE  Not Detected  Not Detected    NORUBPRENORPHINE  Not Detected  Not Detected    FENTANYL  Not Detected  Not Detected    NORFENTANYL  Not Detected  Not Detected    MEPERIDINE METABOLITE  Not Detected  Not Detected

## 2023-09-28 ENCOUNTER — TELEPHONE (OUTPATIENT)
Dept: PAIN MEDICINE | Facility: CLINIC | Age: 73
End: 2023-09-28
Payer: MEDICARE

## 2023-09-28 DIAGNOSIS — C16.9 GASTRIC ADENOCARCINOMA: ICD-10-CM

## 2023-09-28 DIAGNOSIS — M25.562 CHRONIC PAIN OF LEFT KNEE: ICD-10-CM

## 2023-09-28 DIAGNOSIS — M17.12 PRIMARY OSTEOARTHRITIS OF LEFT KNEE: ICD-10-CM

## 2023-09-28 DIAGNOSIS — G89.29 CHRONIC PAIN OF LEFT KNEE: ICD-10-CM

## 2023-09-28 DIAGNOSIS — G89.4 CHRONIC PAIN DISORDER: ICD-10-CM

## 2023-09-28 RX ORDER — OXYCODONE AND ACETAMINOPHEN 5; 325 MG/1; MG/1
1 TABLET ORAL EVERY 8 HOURS PRN
Qty: 90 TABLET | Refills: 0 | OUTPATIENT
Start: 2023-09-28 | End: 2023-10-28

## 2023-09-28 RX ORDER — OXYCODONE AND ACETAMINOPHEN 5; 325 MG/1; MG/1
1 TABLET ORAL EVERY 8 HOURS PRN
Qty: 90 TABLET | Refills: 0 | Status: SHIPPED | OUTPATIENT
Start: 2023-09-28 | End: 2023-10-24 | Stop reason: SDUPTHER

## 2023-09-28 NOTE — TELEPHONE ENCOUNTER
Staff informed patient that her refill has been signed and sent to her  requested pharmacy as of today-----     Message from Yvonne Copee sent at 9/28/2023 10:29 AM CDT -----  Regarding: Refill Request    Who Called: Patient Aide        New Prescription or Refill : Refill    RX Name and Strength:   oxyCODONE-acetaminophen (PERCOCET) 5-325 mg per tablet      RX Name and Strength:         RX Name and Strength:      30 day or 90 day RX:     Preferred Pharmacy:[facility]    Would the patient rather a call back or a response via [facility]?    Best Call Back Number:  [phone]    Additional Information: Patient don't have any Rx left

## 2023-09-28 NOTE — TELEPHONE ENCOUNTER
Staff informed patient that her refill has been signed and sent to her  requested pharmacy as of today-----

## 2023-09-28 NOTE — TELEPHONE ENCOUNTER
----- Message from Yvonne Pineda sent at 9/28/2023 10:29 AM CDT -----  Regarding: Refill Request    Who Called: Patient Aide        New Prescription or Refill : Refill    RX Name and Strength:   oxyCODONE-acetaminophen (PERCOCET) 5-325 mg per tablet      RX Name and Strength:         RX Name and Strength:      30 day or 90 day RX:     Preferred Pharmacy:[facility]    Would the patient rather a call back or a response via [facility]?    Best Call Back Number:  [phone]    Additional Information: Patient don't have any Rx left

## 2023-10-04 ENCOUNTER — CLINICAL SUPPORT (OUTPATIENT)
Dept: SMOKING CESSATION | Facility: CLINIC | Age: 73
End: 2023-10-04
Payer: COMMERCIAL

## 2023-10-04 DIAGNOSIS — F17.200 NICOTINE DEPENDENCE: Primary | ICD-10-CM

## 2023-10-04 PROCEDURE — 99402 PR PREVENT COUNSEL,INDIV,30 MIN: ICD-10-PCS | Mod: S$GLB,,,

## 2023-10-04 PROCEDURE — 99402 PREV MED CNSL INDIV APPRX 30: CPT | Mod: S$GLB,,,

## 2023-10-04 PROCEDURE — 99999 PR PBB SHADOW E&M-EST. PATIENT-LVL I: CPT | Mod: PBBFAC,,,

## 2023-10-04 PROCEDURE — 99999 PR PBB SHADOW E&M-EST. PATIENT-LVL I: ICD-10-PCS | Mod: PBBFAC,,,

## 2023-10-04 NOTE — PROGRESS NOTES
Individual Follow-Up Form    10/4/2023    Quit Date: 8/17/2023    Clinical Status of Patient: Outpatient    Continuing Medication: no    Other Medications: none     Target Symptoms: Withdrawal and medication side effects. The following were  rated moderate (3) to severe (4) on TCRS:  Moderate (3): none  Severe (4): none    Comments: Spoke with patient for a length of time over the telephone and she reports remaining tobacco free. Patient states that continues to be in lots of pain but continues to remain quit because she still hopes to receive her surgery. Patient reports that she no longer uses her free time to cook because the pain is too much. She continues to lose weight in hopes that when she reports back to the doctor next month, that her weight is low enough and she will be clear of nicotine so that she is able to receive a date for her surgery. Discussed tips and strategies to assist with remaining quit without any nicotine replacements. The patient will continue with  therapy sessions and medication monitoring by CTTS. Prescribed medication management will be by physician.     Diagnosis: F17.200    Next Visit: 11/8/2023

## 2023-10-19 ENCOUNTER — TELEPHONE (OUTPATIENT)
Dept: PAIN MEDICINE | Facility: CLINIC | Age: 73
End: 2023-10-19
Payer: MEDICARE

## 2023-10-19 NOTE — TELEPHONE ENCOUNTER
Staff called pt again and there was a busy signal staff will try again tomorrow.    ----- Message from Donis Mendoza sent at 10/19/2023  4:39 PM CDT -----  Name of Who is Calling:LU RENAE [[id number]]           What is the request in detail:pt stated that she would like to speak with the office directly in regards to her questions/concerns, PT DID NOT GIVE INFO ON WHAT IT WAS IN REGARDS TO .Please contact to further discuss and advise.            Can the clinic reply by [facility]: NO           What Number to Call Back [facility]:[phone]

## 2023-10-19 NOTE — TELEPHONE ENCOUNTER
Staff called pt and left vm for pt to give us a callback regarding her message about [employer].    ----- Message from Franny Patel sent at 10/19/2023 11:45 AM CDT -----      Name of Who is Calling: LU RENAE [[id number]]      What is the request in detail: Pt called regarding [employer] and would like a call back.Please contact to further discuss and advise.          Can the clinic reply by [facility]: Y      What [facility]: [phone]

## 2023-10-19 NOTE — TELEPHONE ENCOUNTER
----- Message from Franny Patel sent at 10/19/2023 11:45 AM CDT -----      Name of Who is Calling: LU RENAE [[id number]]      What is the request in detail: Pt called regarding [employer] and would like a call back.Please contact to further discuss and advise.          Can the clinic reply by [facility]: Y      What Number to Call Back [facility] not in [facility]: [phone]

## 2023-10-19 NOTE — TELEPHONE ENCOUNTER
----- Message from Donis Mendoza sent at 10/19/2023  4:39 PM CDT -----  Name of Who is Calling:LU RENAE [[id number]]           What is the request in detail:pt stated that she would like to speak with the office directly in regards to her questions/concerns, PT DID NOT GIVE INFO ON WHAT IT WAS IN REGARDS TO .Please contact to further discuss and advise.            Can the clinic reply by [facility]: NO           What [facility] to [facility] Back [facility]:[phone]

## 2023-10-20 ENCOUNTER — TELEPHONE (OUTPATIENT)
Dept: PAIN MEDICINE | Facility: CLINIC | Age: 73
End: 2023-10-20
Payer: MEDICARE

## 2023-10-20 NOTE — TELEPHONE ENCOUNTER
----- Message from Ria Montalvo Patient Care Assistant sent at 10/20/2023  3:11 PM CDT -----  Hello, The procedure was denied (not sure  why) and payor requested peer to peer .I am not sure if that was done, no one responded to my in basket message. I just checked payor portal and the case is still denied.   ----- Message -----  From: Jelly Rivas MA  Sent: 10/20/2023   3:01 PM CDT  To: Ria Montalvo Patient Care Assistant    Good afternoon the following pt is inquiring her procedure being denied can you please assist me with this issue. Thanks!

## 2023-10-20 NOTE — TELEPHONE ENCOUNTER
----- Message from Betty Arlen sent at 10/20/2023 11:08 AM CDT -----  Contact: LU RENAE [[id number]]  Type: Call Back      Who called: LU RENAE [[id number]]      What is the request in detail: Patient is requesting a call back. Pt states that she received a call stating that her procedure as denied, she is calling to find out if the insurance reached out.   Please advise.     Can the clinic reply by [facility]? No      Would the patient rather a call back or a response via [facility]? Call back       Best call back number: [phone]      Additional Information:

## 2023-10-20 NOTE — TELEPHONE ENCOUNTER
Staff called pt no answer.    ----- Message from Franny Patel sent at 10/20/2023  4:20 PM CDT -----      Name of Who is Calling: LU RENAE [[id number]]      What is the request in detail: Pt returned call to the office.Please contact to further discuss and advise.          Can the clinic reply by [facility]: Y      What Number to Call Back [facility]: [phone]

## 2023-10-20 NOTE — TELEPHONE ENCOUNTER
Staff forwarded the message to mathew boateng to schedule pt for peer to peer.    ----- Message from Ria Montalvo Patient Care Assistant sent at 10/20/2023  3:11 PM CDT -----  Hello, The procedure was denied (not sure  why) and payor requested peer to peer .I am not sure if that was done, no one responded to my in basket message. I just checked payor portal and the case is still denied.   ----- Message -----  From: Jelly Rivas MA  Sent: 10/20/2023   3:01 PM CDT  To: Ria Montalvo Patient Care Assistant    Good afternoon the following pt is inquiring her procedure being denied can you please assist me with this issue. Thanks!

## 2023-10-20 NOTE — TELEPHONE ENCOUNTER
----- Message from Franny Patel sent at 10/20/2023  4:20 PM CDT -----      Name of Who is Calling: LU RENAE [[id number]]      What is the request in detail: Pt returned call to the office.Please contact to further discuss and advise.          Can the clinic reply by [facility]: Y      What [facility] Back [facility]: [phone]

## 2023-10-24 ENCOUNTER — TELEPHONE (OUTPATIENT)
Dept: PAIN MEDICINE | Facility: CLINIC | Age: 73
End: 2023-10-24
Payer: MEDICARE

## 2023-10-24 DIAGNOSIS — G89.4 CHRONIC PAIN SYNDROME: Primary | ICD-10-CM

## 2023-10-24 RX ORDER — OXYCODONE AND ACETAMINOPHEN 5; 325 MG/1; MG/1
1 TABLET ORAL EVERY 8 HOURS PRN
Qty: 90 TABLET | Refills: 0 | Status: SHIPPED | OUTPATIENT
Start: 2023-10-24 | End: 2023-11-27 | Stop reason: SDUPTHER

## 2023-10-24 NOTE — TELEPHONE ENCOUNTER
Staff spoke with pt message already responded to.    ----- Message from Donis Mendoza sent at 10/24/2023 10:04 AM CDT -----  Name of Who is Calling:LU RENAE [[id number]]           What is the request in detail:.Please contact to further discuss and advise.            Can the clinic reply by [facility]: NO           What Number to Call Back if not in [facility]:[phone]

## 2023-10-24 NOTE — TELEPHONE ENCOUNTER
----- Message from Donis Mendoza sent at 10/24/2023 10:04 AM CDT -----  Name of Who is Calling:LU RENAE [[id number]]           What is the request in detail:.Please contact to further discuss and advise.            Can the clinic reply by [facility]: NO           What Number to Call Back if not in [facility]:[phone]

## 2023-10-24 NOTE — TELEPHONE ENCOUNTER
----- Message from Tevin Clark MD sent at 10/24/2023  3:37 PM CDT -----  Unfortunately, the procedure was denied by insurance but we can see her att he clinic and consider another type of injection. I can send refill of the medication.    Team,    Please schedule a follow up with [facility].        ----- Message -----  From: Danay Crocker  Sent: 10/24/2023  11:19 AM CDT  To: Tevin Clark MD    Pt. States she has an approval for [employer] pt. Will like to know if it's for her Knee procedure, pt. Requesting refill for her medication

## 2023-10-24 NOTE — TELEPHONE ENCOUNTER
Pt is scheduled with [facility]    ----- Message from Tevin Clark MD sent at 10/24/2023  3:37 PM CDT -----  Unfortunately, the procedure was denied by insurance but we can see her att he clinic and consider another type of injection. I can send refill of the medication.    Team,    Please schedule a follow up with [facility].        ----- Message -----  From: Danay Crocker  Sent: 10/24/2023  11:19 AM CDT  To: Tevin Clark MD    Pt. States she has an approval for [employer] pt. Will like to know if it's for her Knee procedure, pt. Requesting refill for her medication

## 2023-10-24 NOTE — TELEPHONE ENCOUNTER
----- Message from Danay Crocker sent at 10/24/2023 11:18 AM CDT -----  Pt. States she has an approval for [employer] pt. Will like to know if it's for her Knee procedure, pt. Requesting refill for her medication

## 2023-10-25 ENCOUNTER — TELEPHONE (OUTPATIENT)
Dept: PAIN MEDICINE | Facility: CLINIC | Age: 73
End: 2023-10-25
Payer: MEDICARE

## 2023-10-25 ENCOUNTER — TELEPHONE (OUTPATIENT)
Dept: ADMINISTRATIVE | Facility: OTHER | Age: 73
End: 2023-10-25
Payer: MEDICARE

## 2023-10-25 NOTE — TELEPHONE ENCOUNTER
----- Message from Irina Torrez sent at 10/25/2023  3:37 PM CDT -----  Regarding: returning call  Type:  Patient Returning Call    Who Called:Ca     Who Left Message for Patient:Danay    Does the patient know what this is regarding?:no    Would the patient rather a call back or a response via [facility]? Call    Best Call Back Number:[phone]    Additional Information:        
Procedure dept called pt.  ----- Message from Irina Torrez sent at 10/25/2023  3:37 PM CDT -----  Regarding: returning call  Type:  Patient Returning Call    Who Called:Ca     Who Left Message for Patient:Danay    Does the patient know what this is regarding?:no    Would the patient rather a call back or a response via [facility]? Call    Best Call Back Number:[phone]    Additional Information:        
64 y/o pt left foot laceration from power saw

## 2023-10-25 NOTE — TELEPHONE ENCOUNTER
----- Message from Tevin Clark MD sent at 10/25/2023 11:35 AM CDT -----  Already sent    ----- Message -----  From: Jelly Rivas MA  Sent: 10/24/2023   4:57 PM CDT  To: Tevin Clark MD    Pt is scheduled on Wednesday November 1,2023 and she said please send her medication.  ----- Message -----  From: Tevin Clark MD  Sent: 10/24/2023   3:39 PM CDT  To: Danay Crocker; Eduardo Abarca Staff    Unfortunately, the procedure was denied by insurance but we can see her att he clinic and consider another type of injection. I can send refill of the medication.    Team,    Please schedule a follow up with [facility].        ----- Message -----  From: Danay Crocker  Sent: 10/24/2023  11:19 AM CDT  To: Tevin Clark MD    Pt. States she has an approval for [employer] pt. Will like to know if it's for her Knee procedure, pt. Requesting refill for her medication

## 2023-10-25 NOTE — TELEPHONE ENCOUNTER
Pt is scheduled.      ----- Message from Danay Crocker sent at 10/25/2023  3:33 PM CDT -----  Pt. Have an appt. 11/1/23 with Berry Mccracken to discuss procedure options.    ----- Message -----  From: Tevin Clark MD  Sent: 10/24/2023   3:39 PM CDT  To: Danay Crocker; Eduardo Abarca Staff    Unfortunately, the procedure was denied by insurance but we can see her att he clinic and consider another type of injection. I can send refill of the medication.    Team,    Please schedule a follow up with [facility].        ----- Message -----  From: Danay Crocker  Sent: 10/24/2023  11:19 AM CDT  To: Tevin Clark MD    Pt. States she has an approval for [employer] pt. Will like to know if it's for her Knee procedure, pt. Requesting refill for her medication

## 2023-10-25 NOTE — TELEPHONE ENCOUNTER
Pt messages has been responded to.    ----- Message from Tevin Clark MD sent at 10/25/2023 11:35 AM CDT -----  Already sent    ----- Message -----  From: Jelly Rivas MA  Sent: 10/24/2023   4:57 PM CDT  To: Tevin Clark MD    Pt is scheduled on Wednesday November 1,2023 and she said please send her medication.  ----- Message -----  From: Tevin Clark MD  Sent: 10/24/2023   3:39 PM CDT  To: Danay Crocker; Eduardo Abarca Staff    Unfortunately, the procedure was denied by insurance but we can see her att he clinic and consider another type of injection. I can send refill of the medication.    Team,    Please schedule a follow up with [facility].        ----- Message -----  From: Danay Crocker  Sent: 10/24/2023  11:19 AM CDT  To: Tevin Clark MD    Pt. States she has an approval for [employer] pt. Will like to know if it's for her Knee procedure, pt. Requesting refill for her medication

## 2023-10-25 NOTE — TELEPHONE ENCOUNTER
----- Message from Danay Crocker sent at 10/25/2023  3:33 PM CDT -----  Pt. Have an appt. 11/1/23 with Berry Mccracken to discuss procedure options.    ----- Message -----  From: Tevin Clark MD  Sent: 10/24/2023   3:39 PM CDT  To: Danay Crocker; Eduardo Abarca Staff    Unfortunately, the procedure was denied by insurance but we can see her att he clinic and consider another type of injection. I can send refill of the medication.    Team,    Please schedule a follow up with [facility].        ----- Message -----  From: Danay Crocker  Sent: 10/24/2023  11:19 AM CDT  To: Tevin Clark MD    Pt. States she has an approval for [employer] pt. Will like to know if it's for her Knee procedure, pt. Requesting refill for her medication

## 2023-10-27 ENCOUNTER — OFFICE VISIT (OUTPATIENT)
Dept: HEMATOLOGY/ONCOLOGY | Facility: CLINIC | Age: 73
End: 2023-10-27
Payer: MEDICARE

## 2023-10-27 ENCOUNTER — INFUSION (OUTPATIENT)
Dept: INFUSION THERAPY | Facility: OTHER | Age: 73
End: 2023-10-27
Attending: STUDENT IN AN ORGANIZED HEALTH CARE EDUCATION/TRAINING PROGRAM
Payer: MEDICARE

## 2023-10-27 VITALS
RESPIRATION RATE: 19 BRPM | BODY MASS INDEX: 46.15 KG/M2 | HEART RATE: 86 BPM | TEMPERATURE: 98 F | OXYGEN SATURATION: 96 % | DIASTOLIC BLOOD PRESSURE: 59 MMHG | WEIGHT: 270.31 LBS | SYSTOLIC BLOOD PRESSURE: 111 MMHG | HEIGHT: 64 IN

## 2023-10-27 VITALS — RESPIRATION RATE: 16 BRPM

## 2023-10-27 DIAGNOSIS — R42 DIZZINESS: ICD-10-CM

## 2023-10-27 DIAGNOSIS — Z86.718 HISTORY OF DVT (DEEP VEIN THROMBOSIS): ICD-10-CM

## 2023-10-27 DIAGNOSIS — N93.9 VAGINAL BLEEDING: ICD-10-CM

## 2023-10-27 DIAGNOSIS — F17.200 NICOTINE DEPENDENCE WITH CURRENT USE: ICD-10-CM

## 2023-10-27 DIAGNOSIS — C16.9 GASTRIC ADENOCARCINOMA: Primary | ICD-10-CM

## 2023-10-27 DIAGNOSIS — D64.9 MILD CHRONIC ANEMIA: ICD-10-CM

## 2023-10-27 PROCEDURE — 1101F PR PT FALLS ASSESS DOC 0-1 FALLS W/OUT INJ PAST YR: ICD-10-PCS | Mod: HCNC,CPTII,S$GLB, | Performed by: STUDENT IN AN ORGANIZED HEALTH CARE EDUCATION/TRAINING PROGRAM

## 2023-10-27 PROCEDURE — 1160F RVW MEDS BY RX/DR IN RCRD: CPT | Mod: HCNC,CPTII,S$GLB, | Performed by: STUDENT IN AN ORGANIZED HEALTH CARE EDUCATION/TRAINING PROGRAM

## 2023-10-27 PROCEDURE — 99215 OFFICE O/P EST HI 40 MIN: CPT | Mod: HCNC,S$GLB,, | Performed by: STUDENT IN AN ORGANIZED HEALTH CARE EDUCATION/TRAINING PROGRAM

## 2023-10-27 PROCEDURE — 3008F BODY MASS INDEX DOCD: CPT | Mod: HCNC,CPTII,S$GLB, | Performed by: STUDENT IN AN ORGANIZED HEALTH CARE EDUCATION/TRAINING PROGRAM

## 2023-10-27 PROCEDURE — 99215 PR OFFICE/OUTPT VISIT, EST, LEVL V, 40-54 MIN: ICD-10-PCS | Mod: HCNC,S$GLB,, | Performed by: STUDENT IN AN ORGANIZED HEALTH CARE EDUCATION/TRAINING PROGRAM

## 2023-10-27 PROCEDURE — 63600175 PHARM REV CODE 636 W HCPCS: Mod: HCNC | Performed by: STUDENT IN AN ORGANIZED HEALTH CARE EDUCATION/TRAINING PROGRAM

## 2023-10-27 PROCEDURE — 3288F FALL RISK ASSESSMENT DOCD: CPT | Mod: HCNC,CPTII,S$GLB, | Performed by: STUDENT IN AN ORGANIZED HEALTH CARE EDUCATION/TRAINING PROGRAM

## 2023-10-27 PROCEDURE — 25000003 PHARM REV CODE 250: Mod: HCNC | Performed by: STUDENT IN AN ORGANIZED HEALTH CARE EDUCATION/TRAINING PROGRAM

## 2023-10-27 PROCEDURE — 1160F PR REVIEW ALL MEDS BY PRESCRIBER/CLIN PHARMACIST DOCUMENTED: ICD-10-PCS | Mod: HCNC,CPTII,S$GLB, | Performed by: STUDENT IN AN ORGANIZED HEALTH CARE EDUCATION/TRAINING PROGRAM

## 2023-10-27 PROCEDURE — A4216 STERILE WATER/SALINE, 10 ML: HCPCS | Mod: HCNC | Performed by: STUDENT IN AN ORGANIZED HEALTH CARE EDUCATION/TRAINING PROGRAM

## 2023-10-27 PROCEDURE — 96523 IRRIG DRUG DELIVERY DEVICE: CPT | Mod: HCNC

## 2023-10-27 PROCEDURE — 1125F AMNT PAIN NOTED PAIN PRSNT: CPT | Mod: HCNC,CPTII,S$GLB, | Performed by: STUDENT IN AN ORGANIZED HEALTH CARE EDUCATION/TRAINING PROGRAM

## 2023-10-27 PROCEDURE — 3074F PR MOST RECENT SYSTOLIC BLOOD PRESSURE < 130 MM HG: ICD-10-PCS | Mod: HCNC,CPTII,S$GLB, | Performed by: STUDENT IN AN ORGANIZED HEALTH CARE EDUCATION/TRAINING PROGRAM

## 2023-10-27 PROCEDURE — 4010F PR ACE/ARB THEARPY RXD/TAKEN: ICD-10-PCS | Mod: HCNC,CPTII,S$GLB, | Performed by: STUDENT IN AN ORGANIZED HEALTH CARE EDUCATION/TRAINING PROGRAM

## 2023-10-27 PROCEDURE — 99999 PR PBB SHADOW E&M-EST. PATIENT-LVL V: ICD-10-PCS | Mod: PBBFAC,HCNC,, | Performed by: STUDENT IN AN ORGANIZED HEALTH CARE EDUCATION/TRAINING PROGRAM

## 2023-10-27 PROCEDURE — 3078F DIAST BP <80 MM HG: CPT | Mod: HCNC,CPTII,S$GLB, | Performed by: STUDENT IN AN ORGANIZED HEALTH CARE EDUCATION/TRAINING PROGRAM

## 2023-10-27 PROCEDURE — 3008F PR BODY MASS INDEX (BMI) DOCUMENTED: ICD-10-PCS | Mod: HCNC,CPTII,S$GLB, | Performed by: STUDENT IN AN ORGANIZED HEALTH CARE EDUCATION/TRAINING PROGRAM

## 2023-10-27 PROCEDURE — 4010F ACE/ARB THERAPY RXD/TAKEN: CPT | Mod: HCNC,CPTII,S$GLB, | Performed by: STUDENT IN AN ORGANIZED HEALTH CARE EDUCATION/TRAINING PROGRAM

## 2023-10-27 PROCEDURE — 3288F PR FALLS RISK ASSESSMENT DOCUMENTED: ICD-10-PCS | Mod: HCNC,CPTII,S$GLB, | Performed by: STUDENT IN AN ORGANIZED HEALTH CARE EDUCATION/TRAINING PROGRAM

## 2023-10-27 PROCEDURE — 3078F PR MOST RECENT DIASTOLIC BLOOD PRESSURE < 80 MM HG: ICD-10-PCS | Mod: HCNC,CPTII,S$GLB, | Performed by: STUDENT IN AN ORGANIZED HEALTH CARE EDUCATION/TRAINING PROGRAM

## 2023-10-27 PROCEDURE — 1125F PR PAIN SEVERITY QUANTIFIED, PAIN PRESENT: ICD-10-PCS | Mod: HCNC,CPTII,S$GLB, | Performed by: STUDENT IN AN ORGANIZED HEALTH CARE EDUCATION/TRAINING PROGRAM

## 2023-10-27 PROCEDURE — 99999 PR PBB SHADOW E&M-EST. PATIENT-LVL V: CPT | Mod: PBBFAC,HCNC,, | Performed by: STUDENT IN AN ORGANIZED HEALTH CARE EDUCATION/TRAINING PROGRAM

## 2023-10-27 PROCEDURE — 1159F MED LIST DOCD IN RCRD: CPT | Mod: HCNC,CPTII,S$GLB, | Performed by: STUDENT IN AN ORGANIZED HEALTH CARE EDUCATION/TRAINING PROGRAM

## 2023-10-27 PROCEDURE — 3074F SYST BP LT 130 MM HG: CPT | Mod: HCNC,CPTII,S$GLB, | Performed by: STUDENT IN AN ORGANIZED HEALTH CARE EDUCATION/TRAINING PROGRAM

## 2023-10-27 PROCEDURE — 1159F PR MEDICATION LIST DOCUMENTED IN MEDICAL RECORD: ICD-10-PCS | Mod: HCNC,CPTII,S$GLB, | Performed by: STUDENT IN AN ORGANIZED HEALTH CARE EDUCATION/TRAINING PROGRAM

## 2023-10-27 PROCEDURE — 1101F PT FALLS ASSESS-DOCD LE1/YR: CPT | Mod: HCNC,CPTII,S$GLB, | Performed by: STUDENT IN AN ORGANIZED HEALTH CARE EDUCATION/TRAINING PROGRAM

## 2023-10-27 RX ORDER — SODIUM CHLORIDE 0.9 % (FLUSH) 0.9 %
10 SYRINGE (ML) INJECTION
Status: DISCONTINUED | OUTPATIENT
Start: 2023-10-27 | End: 2023-10-27 | Stop reason: HOSPADM

## 2023-10-27 RX ORDER — HEPARIN 100 UNIT/ML
500 SYRINGE INTRAVENOUS
Status: DISCONTINUED | OUTPATIENT
Start: 2023-10-27 | End: 2023-10-27 | Stop reason: HOSPADM

## 2023-10-27 RX ADMIN — HEPARIN 500 UNITS: 100 SYRINGE at 11:10

## 2023-10-27 RX ADMIN — Medication 10 ML: at 11:10

## 2023-10-27 NOTE — Clinical Note
Port flush per schedule  Ref to neurology, gyn  CT scan ordered  RTC 6 months with labs cbc, cmp prior

## 2023-10-27 NOTE — PLAN OF CARE
Vital Signs Stable, No apparent distress noted; Pt tolerated _PORT FLUSH  w/o difficulty.  Port-a-cath flushed, heparinized and deaccessed;Positive blood return noted.  AVS/Discharge instructions given;all questions answered ;Pt verbalizes understanding.   Follow up appts per ally; wheeled from clinic in NAD

## 2023-10-30 ENCOUNTER — TELEPHONE (OUTPATIENT)
Dept: ADMINISTRATIVE | Facility: CLINIC | Age: 73
End: 2023-10-30
Payer: MEDICARE

## 2023-10-30 NOTE — PROGRESS NOTES
Hematology- Oncology Clinic Note :      10/30/2023    RFV / chief complaint- Gastric adenocarcinoma          HPI  Pt is a 72 y.o. female who  has a past medical history of YOUNG (acute kidney injury) (10/15/2021), Anemia, Arthritis, BMI 60.0-69.9, adult, Cataract, Chronic diastolic congestive heart failure (1/27/2021), Depression, Dry eye syndrome, DVT of deep femoral vein, right (5/29/2023), Gastric adenocarcinoma (5/25/2021), GERD (gastroesophageal reflux disease), Glaucoma suspect, History of gastric ulcer, History of psychiatric hospitalization, psychiatric care, Hyperlipidemia, Hypertension, Hypothyroidism, Morbid obesity, Neuromuscular disorder, Sleep apnea, and Thyroid disease.   Pt presents to the clinic today for gastric cancer    Doing good. Bilateral leg swelling, keeping them elevated helps. Continues to smoke. No issues with bleeding , on eliquis  Joint pain is stable. Physical activity increasing slowly.     Advised to quit smoking    Oncology presentation/ HPI  Pt was being evaluated for gastric bypass surgery   Feb 2021 EGD- Gastric tumor in the prepyloric region of the stomach,  March 2021 EUS- Gastric tumor in the prepyloric region of the stomach.  Both above biopsies did not show malignancy  5/25/21 EGD- Gastric tumor on the posterior wall of the gastric antrum. Complete removal was accomplished.         Reviewed past medical/surgical/social history    Past Medical History:   Diagnosis Date    YOUNG (acute kidney injury) 10/15/2021    Anemia     2018    Arthritis     BMI 60.0-69.9, adult     Cataract     Chronic diastolic congestive heart failure 1/27/2021    Depression     Dry eye syndrome     DVT of deep femoral vein, right 5/29/2023    Gastric adenocarcinoma 5/25/2021    GERD (gastroesophageal reflux disease)     Glaucoma suspect     History of gastric ulcer     History of psychiatric hospitalization     Hx of psychiatric care     Celexa, no recall of charted Effexor, Lexapro, Desyrel  prescriptions    Hyperlipidemia     Hypertension     Hypothyroidism     Morbid obesity     Neuromuscular disorder     Sleep apnea     Thyroid disease       Past Surgical History:   Procedure Laterality Date    APPENDECTOMY      CARDIAC SURGERY      CATARACT EXTRACTION W/  INTRAOCULAR LENS IMPLANT Bilateral     MFIOL OU    CORONARY ANGIOGRAPHY Bilateral 2/24/2021    Procedure: ANGIOGRAM, CORONARY ARTERY;  Surgeon: Cresencio Ridley MD;  Location: [facility] CATH LAB;  Service: Cardiology;  Laterality: Bilateral;  Covid test needed - ok per Danay SSCU. Pt. w/o transportation or family    ENDOSCOPIC ULTRASOUND OF UPPER GASTROINTESTINAL TRACT N/A 3/17/2021    Procedure: ULTRASOUND, UPPER GI TRACT, ENDOSCOPIC;  Surgeon: Gerald Anaya MD;  Location: [facility] (2ND FLR);  Service: Endoscopy;  Laterality: N/A;  Pt unable to get a ride for swab. Will do rapid test at 8:30 - ttr    ENDOSCOPIC ULTRASOUND OF UPPER GASTROINTESTINAL TRACT N/A 1/13/2022    Procedure: ULTRASOUND, UPPER GI TRACT, ENDOSCOPIC;  Surgeon: Kevin Carballo MD;  Location: [facility] ([address] [place] [address]);  Service: Endoscopy;  Laterality: N/A;  blood thinner approval received, see telephone encounter 1/7/22-BB  rapid  instructions given verbally and sent to address on file in pt's chart-BB    ENDOSCOPIC ULTRASOUND OF UPPER GASTROINTESTINAL TRACT N/A 10/11/2022    Procedure: ULTRASOUND, UPPER GI TRACT, ENDOSCOPIC;  Surgeon: Dequan Ridley MD;  Location: [facility] ([address] [place] [address]);  Service: Endoscopy;  Laterality: N/A;    ESOPHAGOGASTRODUODENOSCOPY N/A 2/5/2021    Procedure: EGD (ESOPHAGOGASTRODUODENOSCOPY);  Surgeon: Matthew Hernadez MD;  [facility]  Service: Endoscopy;  Laterality: N/A;  BMI-58    Wt: 361#     COVID test at [facility] on 2/2-GT    ESOPHAGOGASTRODUODENOSCOPY N/A 3/17/2021    Procedure: EGD (ESOPHAGOGASTRODUODENOSCOPY);  Surgeon: Gerald Anaya MD;  [facility]  Service: Endoscopy;  Laterality: N/A;     ESOPHAGOGASTRODUODENOSCOPY N/A 5/25/2021    Procedure: EGD (ESOPHAGOGASTRODUODENOSCOPY);  Surgeon: Kevin Carballo MD;  Location: [facility] ENDO (2ND FLR);  Service: Endoscopy;  Laterality: N/A;  ESD 2 hours  Full COVID vaccination on file. ttr    ESOPHAGOGASTRODUODENOSCOPY N/A 1/13/2022    Procedure: EGD (ESOPHAGOGASTRODUODENOSCOPY);  Surgeon: Kevin Carballo MD;  Location: [facility] ENDO (2ND FLR);  Service: Endoscopy;  Laterality: N/A;  blood thinner approval, see telephone encounter 1/7/22-BB  rapid  instructions given verbally and sent to address on file in pt's chart-BB    ESOPHAGOGASTRODUODENOSCOPY N/A 10/11/2022    Procedure: EGD (ESOPHAGOGASTRODUODENOSCOPY);  Surgeon: Dequan Ridley MD;  Location: [facility] ENDO (2ND FLR);  Service: Endoscopy;  Laterality: N/A;  She is do for EGD/EUS now. Personal history of gastric cancer. Ca Coats #[id number].   Thanks,   Kevin Fuentes MD    Pt is fully vaccinated-DS  9/14/22-Approval to hold Davian rec'd from Dr. Pelaez (see telephone encounter 9/14/22)-DS  9/14/22-Ins    EYE SURGERY      INJECTION OF ANESTHETIC AGENT AROUND NERVE Left 7/10/2018    Procedure: BLOCK, NERVE;  Surgeon: Samantha Vidal MD;  Location: [facility] PAIN T;  Service: Pain Management;  Laterality: Left;  Genicular     INJECTION OF ANESTHETIC AGENT AROUND NERVE Left 7/19/2019    Procedure: Block, Nerve NERVE BLOCK GENICULAR WITH PHENOL 6%;  Surgeon: Samantha Vidal MD;  Location: [facility] PAIN MGT;  Service: Pain Management;  Laterality: Left;  NEEDS CONSENT    INSERTION OF TUNNELED CENTRAL VENOUS CATHETER (CVC) WITH SUBCUTANEOUS PORT N/A 7/9/2021    Procedure: INSERTION, PORT-A-CATH;  Surgeon: Mario Paz MD;  Location: [facility] CATH LAB;  Service: Radiology;  Laterality: N/A;  PORT PLACEMENT    RADIOFREQUENCY ABLATION Left 6/19/2020    Procedure: RADIOFREQUENCY ABLATION LEFT GENICULAR;  Surgeon: Tevin Clark MD;  Location: [facility] PAIN MGT;  Service: Pain Management;  Laterality: Left;  Left Genicular  RFA    RADIOFREQUENCY ABLATION Left 1/22/2021    Procedure: RADIOFREQUENCY ABLATION LEFT GENICULAR;  Surgeon: Tevin Clark MD;  Location: [facility] PAIN MGT;  Service: Pain Management;  Laterality: Left;    RADIOFREQUENCY ABLATION Left 7/30/2021    Procedure: RADIOFREQUENCY ABLATION, GENICULAR COOLED NEED CONSENT;  Surgeon: Tevin Clark MD;  Location: [facility] PAIN MGT;  Service: Pain Management;  Laterality: Left;    RADIOFREQUENCY ABLATION Left 4/22/2022    Procedure: RADIOFREQUENCY ABLATION, GENICULAR COOLED , LEFT;  Surgeon: Tevin Clark MD;  Location: [facility] PAIN MGT;  Service: Pain Management;  Laterality: Left;    RADIOFREQUENCY ABLATION Left 12/23/2022    Procedure: RADIOFREQUENCY ABLATION LEFT GENICULAR COOLED CLEARED TO HOLD ELIQUIS 3 DAYS  NEEDS CONSENT;  Surgeon: Tevin Clark MD;  Location: [facility] PAIN MGT;  Service: Pain Management;  Laterality: Left;    TONSILLECTOMY        (Not in a hospital admission)      Review of patient's allergies indicates:  No Known Allergies   Social History     Tobacco Use    Smoking status: Former     Types: Cigarettes     Start date: 2018    Smokeless tobacco: Never    Tobacco comments:     currently smoking <5 cigarettes most days   Substance Use Topics    Alcohol use: Yes     Comment: raely      Family History   Problem Relation Age of Onset    No Known Problems Mother     No Known Problems Father     Colon cancer Neg Hx     Esophageal cancer Neg Hx           Review of Systems :  Review of Systems   Constitutional:  Positive for malaise/fatigue. Negative for chills, diaphoresis, fever and weight loss.   HENT: Negative.  Negative for congestion, hearing loss, nosebleeds, sore throat and tinnitus.    Eyes:  Negative for blurred vision, discharge and redness.   Respiratory:  Negative for cough, hemoptysis, sputum production, shortness of breath and wheezing.    Cardiovascular:  Positive for leg swelling. Negative for chest pain and palpitations.   Gastrointestinal:   "Negative for abdominal pain, blood in stool, constipation, diarrhea, heartburn, melena, nausea and vomiting.   Genitourinary: Negative.    Musculoskeletal:  Positive for joint pain. Negative for back pain, falls and myalgias.   Skin:  Negative for itching and rash.   Neurological:  Negative for dizziness, tingling, sensory change, speech change, focal weakness, seizures, loss of consciousness, weakness and headaches.   Endo/Heme/Allergies: Negative.  Does not bruise/bleed easily.   Psychiatric/Behavioral: Negative.  Negative for depression. The patient is not nervous/anxious and does not have insomnia.                Physical Exam :  BP (!) 111/59 (BP Location: Right arm, Patient Position: Sitting, BP Method: Medium (Automatic)) Comment (BP Location): wrist  Pulse 86   Temp 98.1 °F (36.7 °C) (Oral)   Resp 19   Ht 5' 4" (1.626 m)   Wt 122.6 kg (270 lb 4.5 oz)   SpO2 96%   BMI 46.39 kg/m²   Wt Readings from Last 3 Encounters:   10/27/23 122.6 kg (270 lb 4.5 oz)   08/31/23 123.4 kg (272 lb 0.8 oz)   07/31/23 121.1 kg (267 lb)       Body mass index is 46.39 kg/m².      Physical Exam  Vitals and nursing note reviewed.   Constitutional:       General: She is not in acute distress.     Appearance: She is well-developed. She is not ill-appearing.   HENT:      Head: Normocephalic and atraumatic.      Right Ear: External ear normal.      Left Ear: External ear normal.      Mouth/Throat:      Pharynx: No oropharyngeal exudate.   Eyes:      General: No scleral icterus.     Conjunctiva/sclera:      Right eye: No hemorrhage.     Left eye: No hemorrhage.  Neck:      Thyroid: No thyromegaly.      Trachea: No tracheal deviation.   Cardiovascular:      Rate and Rhythm: Normal rate and regular rhythm.      Heart sounds: Normal heart sounds. No murmur heard.  Pulmonary:      Effort: Pulmonary effort is normal. No respiratory distress.      Breath sounds: Decreased breath sounds present. No wheezing or rales.      Comments: Port " SOI  Abdominal:      General: Bowel sounds are normal. There is no distension (obese).      Palpations: Abdomen is soft. There is no mass.      Tenderness: There is no abdominal tenderness. There is no rebound.   Musculoskeletal:         General: No swelling or tenderness. Normal range of motion.      Cervical back: Normal range of motion and neck supple.   Lymphadenopathy:      Cervical: No cervical adenopathy.   Skin:     General: Skin is warm.      Findings: No erythema, rash or wound.   Neurological:      Mental Status: She is alert and oriented to person, place, and time.      Cranial Nerves: No cranial nerve deficit.      Gait: Gait abnormal (uses cane).   Psychiatric:         Behavior: Behavior normal.             Current Outpatient Medications   Medication Sig Dispense Refill    acetaminophen (TYLENOL) 500 MG tablet       amLODIPine (NORVASC) 10 MG tablet TAKE 1 TABLET ONE TIME DAILY 90 tablet 11    atorvastatin (LIPITOR) 20 MG tablet TAKE 1 TABLET EVERY DAY 90 tablet 2    B-complex with vitamin C (Z-BEC OR EQUIV) tablet Take 1 tablet by mouth once daily.       CALCIUM CITRATE-VITAMIN D2 ORAL Take 1 tablet by mouth once daily.       citalopram (CELEXA) 10 MG tablet TAKE 1 TABLET EVERY DAY 90 tablet 3    dextran 70-hypromellose (TEARS) ophthalmic solution Place 1 drop into the right eye every 6 (six) hours. 30 mL 0    ELIQUIS 5 mg Tab TAKE 1 TABLET TWICE DAILY 180 tablet 2    ferrous gluconate (FERGON) 324 MG tablet TAKE 1 TABLET (324 MG TOTAL) BY MOUTH DAILY WITH BREAKFAST. 90 tablet 11    furosemide (LASIX) 80 MG tablet TAKE 1 TABLET TWICE DAILY 180 tablet 3    gabapentin (NEURONTIN) 300 MG capsule Take 2 capsules (600 mg total) by mouth 3 (three) times daily. 540 capsule 2    ibuprofen (ADVIL,MOTRIN) 600 MG tablet Take 1 tablet (600 mg total) by mouth every 8 (eight) hours as needed for Pain. 10 tablet 0    lactulose (CHRONULAC) 10 gram/15 mL solution Take 30 mLs (20 g total) by mouth 2 (two) times daily  as needed (constipation). 120 mL 1    LIDOcaine-prilocaine (EMLA) cream Apply topically as needed (prior to port access). 30 g 0    lisinopriL (PRINIVIL,ZESTRIL) 40 MG tablet TAKE 1 TABLET ONE TIME DAILY 90 tablet 11    multivitamin with minerals tablet Take 1 tablet by mouth once daily.       nicotine (NICODERM CQ) 21 mg/24 hr Place 1 patch onto the skin once daily. 28 patch 0    oxyCODONE-acetaminophen (PERCOCET) 5-325 mg per tablet Take 1 tablet by mouth every 8 (eight) hours as needed for Pain. 90 tablet 0    pantoprazole (PROTONIX) 40 MG tablet TAKE 1 TABLET EVERY DAY 90 tablet 11    semaglutide (OZEMPIC) 1 mg/dose (4 mg/3 mL) INJECT 1 MG INTO THE SKIN EVERY 7 DAYS. 9 each 11     No current facility-administered medications for this visit.     Facility-Administered Medications Ordered in Other Visits   Medication Dose Route Frequency Provider Last Rate Last Admin    0.9%  NaCl infusion   Intravenous Continuous Tevin Clark MD   Stopped at 01/13/22 1055       Pertinent Diagnostic studies:      Lab Visit on 10/27/2023   Component Date Value Ref Range Status    WBC 10/27/2023 12.45  3.90 - 12.70 K/uL Final    RBC 10/27/2023 3.47 (L)  4.00 - 5.40 M/uL Final    Hemoglobin 10/27/2023 11.4 (L)  12.0 - 16.0 g/dL Final    Hematocrit 10/27/2023 34.9 (L)  37.0 - 48.5 % Final    MCV 10/27/2023 101 (H)  82 - 98 fL Final    MCH 10/27/2023 32.9 (H)  27.0 - 31.0 pg Final    MCHC 10/27/2023 32.7  32.0 - 36.0 g/dL Final    RDW 10/27/2023 12.6  11.5 - 14.5 % Final    Platelets 10/27/2023 224  150 - 450 K/uL Final    MPV 10/27/2023 10.5  9.2 - 12.9 fL Final    Immature Granulocytes 10/27/2023 0.6 (H)  0.0 - 0.5 % Final    Gran # (ANC) 10/27/2023 8.9 (H)  1.8 - 7.7 K/uL Final    Immature Grans (Abs) 10/27/2023 0.07 (H)  0.00 - 0.04 K/uL Final    Comment: Mild elevation in immature granulocytes is non specific and   can be seen in a variety of conditions including stress response,   acute inflammation, trauma and pregnancy.  Correlation with other   laboratory and clinical findings is essential.      Lymph # 10/27/2023 2.1  1.0 - 4.8 K/uL Final    Mono # 10/27/2023 1.3 (H)  0.3 - 1.0 K/uL Final    Eos # 10/27/2023 0.1  0.0 - 0.5 K/uL Final    Baso # 10/27/2023 0.04  0.00 - 0.20 K/uL Final    nRBC 10/27/2023 0  0 /100 WBC Final    Gran % 10/27/2023 71.3  38.0 - 73.0 % Final    Lymph % 10/27/2023 16.7 (L)  18.0 - 48.0 % Final    Mono % 10/27/2023 10.2  4.0 - 15.0 % Final    Eosinophil % 10/27/2023 0.9  0.0 - 8.0 % Final    Basophil % 10/27/2023 0.3  0.0 - 1.9 % Final    Differential Method 10/27/2023 Automated   Final    Ferritin 10/27/2023 354 (H)  20.0 - 300.0 ng/mL Final    Sodium 10/27/2023 140  136 - 145 mmol/L Final    Potassium 10/27/2023 3.7  3.5 - 5.1 mmol/L Final    Chloride 10/27/2023 109  95 - 110 mmol/L Final    CO2 10/27/2023 22 (L)  23 - 29 mmol/L Final    Glucose 10/27/2023 103  70 - 110 mg/dL Final    BUN 10/27/2023 17  8 - 23 mg/dL Final    Creatinine 10/27/2023 1.0  0.5 - 1.4 mg/dL Final    Calcium 10/27/2023 9.7  8.7 - 10.5 mg/dL Final    Total Protein 10/27/2023 6.3  6.0 - 8.4 g/dL Final    Albumin 10/27/2023 3.0 (L)  3.5 - 5.2 g/dL Final    Total Bilirubin 10/27/2023 0.6  0.1 - 1.0 mg/dL Final    Comment: For infants and newborns, interpretation of results should be based  on gestational age, weight and in agreement with clinical  observations.    Premature Infant recommended reference ranges:  Up to 24 hours.............<8.0 mg/dL  Up to 48 hours............<12.0 mg/dL  3-5 days..................<15.0 mg/dL  6-29 days.................<15.0 mg/dL      Alkaline Phosphatase 10/27/2023 94  55 - 135 U/L Final    AST 10/27/2023 14  10 - 40 U/L Final    ALT 10/27/2023 9 (L)  10 - 44 U/L Final    eGFR 10/27/2023 >60  >60 mL/min/1.73 m^2 Final    Anion Gap 10/27/2023 9  8 - 16 mmol/L Final         Patient Active Problem List    Diagnosis Date Noted    Gastric adenocarcinoma 05/25/2021    Chronic diastolic congestive heart  failure 01/27/2021    Morbid obesity with BMI of 45.0-49.9, adult 04/29/2019    Essential hypertension 04/29/2019    Left knee DJD 12/21/2018    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/2023    DVT of deep femoral vein, right 05/29/2023    Current mild episode of major depressive disorder without prior episode 03/07/2022    History of DVT (deep vein thrombosis) 10/15/2021    Chronic pain 07/30/2021    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    Abnormal cardiovascular stress test 02/24/2021    Tobacco abuse 01/28/2021    Pure hypercholesterolemia 01/27/2021    Gastroesophageal reflux disease 03/22/2020    Osteopenia of neck of left femur 01/14/2020    Left knee pain 07/19/2019    Hypothyroidism 07/18/2019    Debility     At high risk for injury related to fall     Gait instability 07/15/2019    Major depressive disorder 04/29/2019    Joint pain 04/29/2019    Chronic knee pain 11/30/2018    Obstructive sleep apnea 08/09/2018    Primary osteoarthritis of left knee 07/10/2018     Active Problem List with Overview Notes    Diagnosis Date Noted    Gastric adenocarcinoma 05/25/2021     gastric cancer cT2 or higher      Chronic diastolic congestive heart failure 01/27/2021    Morbid obesity with BMI of 45.0-49.9, adult 04/29/2019    Essential hypertension 04/29/2019    Left knee DJD 12/21/2018    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/2023    DVT of deep femoral vein, right 05/29/2023    Current mild episode of major depressive disorder without prior episode 03/07/2022    History of DVT (deep vein thrombosis) 10/15/2021    Chronic pain 07/30/2021    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    Abnormal cardiovascular stress test 02/24/2021    Tobacco abuse 01/28/2021    Pure hypercholesterolemia 01/27/2021    Gastroesophageal reflux disease 03/22/2020    Osteopenia of neck of left femur 01/14/2020    Left knee pain 07/19/2019     Hypothyroidism 07/18/2019    Debility     At high risk for injury related to fall     Gait instability 07/15/2019    Major depressive disorder 04/29/2019    Joint pain 04/29/2019    Chronic knee pain 11/30/2018    Obstructive sleep apnea 08/09/2018    Primary osteoarthritis of left knee 07/10/2018         Oncology History   Gastric adenocarcinoma   2/5/2021 Procedure    EGD  Feb 2021 EGD- Gastric tumor in the prepyloric region of the stomach,     3/17/2021 Procedure    EUS  Impression:           - Gastric tumor in the prepyloric region of the                         stomach. Biopsied. Lesion appears amenable to                         endoscopic resection (ESD) - Dr. Carballo was present                         to view the lesion.      5/25/2021 Initial Diagnosis    Gastric adenocarcinoma     5/25/2021 Pathology Significant Finding    Adenocarcinoma, moderate to poorly differentiated   Tumor invades deep layers of submuocsa with deep margin extensively positive   Deep margin is extensively positive   Lateral margins are negative for neoplasia or malignancy      5/25/2021 Cancer Staged    Staging form: Stomach, AJCC 8th Edition  - Pathologic stage from 5/25/2021: Stage Unknown (pT1, pNX, cM0)     6/18/2021 Imaging Significant Findings    CT CAP   Asymmetric gastric wall thickening at the level of the gastric antrum presumably representing the patient's gastric adenocarcinoma.  I see no CT evidence for metastatic disease.     Low-density focus lower pole left kidney does not meet criteria for a simple cyst.  Findings can be further evaluated with ultrasound.  Additional subcentimeter low-density foci in the right kidney likely too small to characterize by imaging.     6/28/2021 Tumor Conference       [facility] UGI MULTIDISCIPLINARY TUMOR BOARD  PATIENT REVIEW FORM   ____________________________________________________________    CLINIC #: [id number]  DATE: 6/28/2021    DIAGNOSIS: gastric CA    PRESENTER:  Latanya/ Donnie Sanders    PATIENT SUMMARY:   This 71 y/o female had incidental finding of gastric CA on EGD as part of bariatric evaluation given BMI 57. Underwent ESD per Dr. Carballo. Reviewed pathology - pT1b with LVI positive, extensive tumor at deep margin.     BOARD RECOMMENDATIONS:   Recommend perioperative chemotherapy, 4 cycles of FLOT    CONSULT NEEDED:     [] Surgery    [x] Hem/Onc    [] Rad/Onc    [] Dietary                 [] Social Service    [] Psychology       [] AES  [] Radiology     ESD Pathology Stage: Tumor 1b  Node(s) 0 Metastasis 0      GROUP STAGE:  [] O    [] 1A    [] IB    [] IIA    [] IIB     [] IIIA     [] IIIB     [] IIIC    []IV  [] Local recurrence     [] Regional recurrence     [] Distant recurrence   Metastatic site(s): none         [x] Jennifer'l Treatment Guidelines reviewed and care planned is consistent with guidelines.         (i.e., NCCN, NCI, PD, ACO, AUA, etc.)    PRESENTATION AT CANCER CONFERENCE:         [x] Prospective    [] Retrospective     [] Follow-Up       7/21/2021 - 9/30/2021 Chemotherapy    Treatment Summary   Plan Name: OP GASTRIC FLOT - FLUOROURACIL LEUCOVORIN OXALIPLATIN DOCETAXEL Q2W  Treatment Goal: Curative  Status: Inactive  Start Date: 7/21/2021  End Date: 9/30/2021  Provider: Cathy Pelaez MD  Chemotherapy: DOCEtaxeL (TAXOTERE) 50 mg/m2 = 135 mg in sodium chloride 0.9% 250 mL chemo infusion, 50 mg/m2 = 135 mg, Intravenous, Clinic/HOD 1 time, 4 of 4 cycles  Dose modification: 40 mg/m2 (original dose 50 mg/m2, Cycle 3)  Administration: 135 mg (7/21/2021), 135 mg (8/12/2021), 105 mg (9/15/2021), 105 mg (9/29/2021)  leucovorin calcium 200 mg/m2 = 545 mg in dextrose 5 % 250 mL infusion, 200 mg/m2 = 545 mg, Intravenous, Clinic/HOD 1 time, 4 of 4 cycles  Administration: 545 mg (7/21/2021), 535 mg (8/12/2021), 530 mg (9/15/2021), 525 mg (9/29/2021)  oxaliplatin (ELOXATIN) 85 mg/m2 = 232 mg in dextrose 5 % 500 mL chemo infusion, 85 mg/m2 = 232 mg, Intravenous, Clinic/HOD 1  time, 4 of 4 cycles  Administration: 232 mg (7/21/2021), 228 mg (8/12/2021), 225 mg (9/15/2021), 223 mg (9/29/2021)  fluorouraciL 7,000 mg in sodium chloride 0.9% 240 mL chemo infusion, 7,100 mg, Intravenous, Over 24 hours, 4 of 11 cycles  Dose modification: 2,000 mg/m2 (original dose 2,600 mg/m2, Cycle 3), 225 mg/m2/day (original dose 2,600 mg/m2, Cycle 5)  Administration: 7,000 mg (7/21/2021), 6,970 mg (8/12/2021), 5,300 mg (9/15/2021), 5,000 mg (9/29/2021)     9/16/2021 Imaging Significant Findings    Thrombosis of the right popliteal, posterior tibial, and peroneal veins.     11/15/2021 Imaging Significant Findings    CT CAP Overall, no detrimental change compared to previous.  Persistent eccentric wall thickening at the level of the antrum in this patient with provided history of gastric adenocarcinoma.  No metastatic disease.     Subcentimeter pulmonary nodules unchanged.  No new nodules.     Cholelithiasis     1/13/2022 Procedure    EUS  Impression:            - Normal esophagus.                          - Scar in the gastric antrum. Biopsied.                          - Congested, nodular and ulcerated mucosa in the                          antrum. Biopsied. Treated with argon plasma                          coagulation (APC).                          - Acquired deformity in the prepyloric region of                          the stomach.                          - Normal examined duodenum.                          - There was abnormal echogenicity in the                          visualized portion of the liver. This was                          hyperechoic and homogenous. This can be seen with                          fatty liver.                          - Hyperechoic material consistent with sludge was                          visualized endosonographically in the gallbladder.                          - There was dilation in the common bile duct which                          measured up to 10.8 mm.                           - Wall thickening was seen in the antrum of the                          stomach. This probable related to previous ESD.      2/14/2022 -  Chemotherapy    Treatment Summary   Plan Name: OP FLUOROURACIL (5 DAY) QW + XRT  Treatment Goal: Curative  Status: Active  Start Date: 2/14/2022  End Date: 3/30/2022  Provider: Cathy Pelaez MD  Chemotherapy: [No matching medication found in this treatment plan]     2/14/2022 - 3/29/2022 Radiation Therapy    Treating physician: Dr. Sonu Darden  Total Dose: 50.4 Gy  Fractions: 28    Course: C1 Abdomen 2022    Treatment Site Ref. ID Energy Dose/Fx (Gy)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

## 2023-10-30 NOTE — TELEPHONE ENCOUNTER
Called pt, informed pt I was calling to remind pt of her in office EAWV on 10/31/23; clinic location provided to patient; pt confirmed appointment

## 2023-10-31 ENCOUNTER — OFFICE VISIT (OUTPATIENT)
Dept: INTERNAL MEDICINE | Facility: CLINIC | Age: 73
End: 2023-10-31
Payer: MEDICARE

## 2023-10-31 VITALS
SYSTOLIC BLOOD PRESSURE: 113 MMHG | WEIGHT: 270.31 LBS | OXYGEN SATURATION: 97 % | DIASTOLIC BLOOD PRESSURE: 58 MMHG | HEIGHT: 64 IN | BODY MASS INDEX: 46.15 KG/M2 | HEART RATE: 76 BPM

## 2023-10-31 DIAGNOSIS — E03.9 HYPOTHYROIDISM, UNSPECIFIED TYPE: ICD-10-CM

## 2023-10-31 DIAGNOSIS — Z86.718 HISTORY OF DVT (DEEP VEIN THROMBOSIS): Chronic | ICD-10-CM

## 2023-10-31 DIAGNOSIS — I70.0 ATHEROSCLEROSIS OF AORTA: ICD-10-CM

## 2023-10-31 DIAGNOSIS — M25.50 ARTHRALGIA, UNSPECIFIED JOINT: ICD-10-CM

## 2023-10-31 DIAGNOSIS — E78.00 PURE HYPERCHOLESTEROLEMIA: Chronic | ICD-10-CM

## 2023-10-31 DIAGNOSIS — Z00.00 ENCOUNTER FOR PREVENTIVE HEALTH EXAMINATION: Primary | ICD-10-CM

## 2023-10-31 DIAGNOSIS — Z99.3 DEPENDENCE ON WHEELCHAIR: ICD-10-CM

## 2023-10-31 DIAGNOSIS — K21.9 GASTROESOPHAGEAL REFLUX DISEASE WITHOUT ESOPHAGITIS: ICD-10-CM

## 2023-10-31 DIAGNOSIS — M85.852 OSTEOPENIA OF NECK OF LEFT FEMUR: ICD-10-CM

## 2023-10-31 DIAGNOSIS — D50.0 IRON DEFICIENCY ANEMIA SECONDARY TO BLOOD LOSS (CHRONIC): ICD-10-CM

## 2023-10-31 DIAGNOSIS — Z00.00 ENCOUNTER FOR MEDICARE ANNUAL WELLNESS EXAM: ICD-10-CM

## 2023-10-31 DIAGNOSIS — I10 ESSENTIAL HYPERTENSION: ICD-10-CM

## 2023-10-31 DIAGNOSIS — R26.9 ABNORMALITY OF GAIT AND MOBILITY: ICD-10-CM

## 2023-10-31 DIAGNOSIS — M17.12 PRIMARY OSTEOARTHRITIS OF LEFT KNEE: ICD-10-CM

## 2023-10-31 DIAGNOSIS — M25.562 CHRONIC PAIN OF LEFT KNEE: ICD-10-CM

## 2023-10-31 DIAGNOSIS — G47.33 OBSTRUCTIVE SLEEP APNEA: ICD-10-CM

## 2023-10-31 DIAGNOSIS — E66.01 MORBID OBESITY WITH BMI OF 45.0-49.9, ADULT: ICD-10-CM

## 2023-10-31 DIAGNOSIS — G89.29 CHRONIC PAIN OF LEFT KNEE: ICD-10-CM

## 2023-10-31 DIAGNOSIS — F32.0 CURRENT MILD EPISODE OF MAJOR DEPRESSIVE DISORDER WITHOUT PRIOR EPISODE: ICD-10-CM

## 2023-10-31 DIAGNOSIS — Z91.81 AT HIGH RISK FOR INJURY RELATED TO FALL: ICD-10-CM

## 2023-10-31 DIAGNOSIS — C16.9 GASTRIC ADENOCARCINOMA: ICD-10-CM

## 2023-10-31 DIAGNOSIS — D84.81 IMMUNODEFICIENCY DUE TO CONDITIONS CLASSIFIED ELSEWHERE: ICD-10-CM

## 2023-10-31 DIAGNOSIS — Z72.0 TOBACCO ABUSE: Chronic | ICD-10-CM

## 2023-10-31 DIAGNOSIS — I82.411 DVT OF DEEP FEMORAL VEIN, RIGHT: ICD-10-CM

## 2023-10-31 DIAGNOSIS — G89.29 OTHER CHRONIC PAIN: ICD-10-CM

## 2023-10-31 DIAGNOSIS — I50.32 CHRONIC DIASTOLIC CONGESTIVE HEART FAILURE: Chronic | ICD-10-CM

## 2023-10-31 DIAGNOSIS — R26.81 GAIT INSTABILITY: ICD-10-CM

## 2023-10-31 DIAGNOSIS — R94.39 ABNORMAL CARDIOVASCULAR STRESS TEST: ICD-10-CM

## 2023-10-31 DIAGNOSIS — R53.81 DEBILITY: ICD-10-CM

## 2023-10-31 DIAGNOSIS — F32.9 MAJOR DEPRESSIVE DISORDER, REMISSION STATUS UNSPECIFIED, UNSPECIFIED WHETHER RECURRENT: ICD-10-CM

## 2023-10-31 PROCEDURE — 4010F PR ACE/ARB THEARPY RXD/TAKEN: ICD-10-PCS | Mod: HCNC,CPTII,S$GLB, | Performed by: NURSE PRACTITIONER

## 2023-10-31 PROCEDURE — 4010F ACE/ARB THERAPY RXD/TAKEN: CPT | Mod: HCNC,CPTII,S$GLB, | Performed by: NURSE PRACTITIONER

## 2023-10-31 PROCEDURE — 1101F PT FALLS ASSESS-DOCD LE1/YR: CPT | Mod: HCNC,CPTII,S$GLB, | Performed by: NURSE PRACTITIONER

## 2023-10-31 PROCEDURE — 1170F FXNL STATUS ASSESSED: CPT | Mod: HCNC,CPTII,S$GLB, | Performed by: NURSE PRACTITIONER

## 2023-10-31 PROCEDURE — 3078F DIAST BP <80 MM HG: CPT | Mod: HCNC,CPTII,S$GLB, | Performed by: NURSE PRACTITIONER

## 2023-10-31 PROCEDURE — G0439 PR MEDICARE ANNUAL WELLNESS SUBSEQUENT VISIT: ICD-10-PCS | Mod: HCNC,S$GLB,, | Performed by: NURSE PRACTITIONER

## 2023-10-31 PROCEDURE — 1160F RVW MEDS BY RX/DR IN RCRD: CPT | Mod: HCNC,CPTII,S$GLB, | Performed by: NURSE PRACTITIONER

## 2023-10-31 PROCEDURE — G9919 PR SCREENING AND POSITIVE: ICD-10-PCS | Mod: HCNC,CPTII,S$GLB, | Performed by: NURSE PRACTITIONER

## 2023-10-31 PROCEDURE — 1101F PR PT FALLS ASSESS DOC 0-1 FALLS W/OUT INJ PAST YR: ICD-10-PCS | Mod: HCNC,CPTII,S$GLB, | Performed by: NURSE PRACTITIONER

## 2023-10-31 PROCEDURE — 3288F PR FALLS RISK ASSESSMENT DOCUMENTED: ICD-10-PCS | Mod: HCNC,CPTII,S$GLB, | Performed by: NURSE PRACTITIONER

## 2023-10-31 PROCEDURE — 1160F PR REVIEW ALL MEDS BY PRESCRIBER/CLIN PHARMACIST DOCUMENTED: ICD-10-PCS | Mod: HCNC,CPTII,S$GLB, | Performed by: NURSE PRACTITIONER

## 2023-10-31 PROCEDURE — G9919 SCRN ND POS ND PROV OF REC: HCPCS | Mod: HCNC,CPTII,S$GLB, | Performed by: NURSE PRACTITIONER

## 2023-10-31 PROCEDURE — G0439 PPPS, SUBSEQ VISIT: HCPCS | Mod: HCNC,S$GLB,, | Performed by: NURSE PRACTITIONER

## 2023-10-31 PROCEDURE — 1170F PR FUNCTIONAL STATUS ASSESSED: ICD-10-PCS | Mod: HCNC,CPTII,S$GLB, | Performed by: NURSE PRACTITIONER

## 2023-10-31 PROCEDURE — 3074F SYST BP LT 130 MM HG: CPT | Mod: HCNC,CPTII,S$GLB, | Performed by: NURSE PRACTITIONER

## 2023-10-31 PROCEDURE — 1159F PR MEDICATION LIST DOCUMENTED IN MEDICAL RECORD: ICD-10-PCS | Mod: HCNC,CPTII,S$GLB, | Performed by: NURSE PRACTITIONER

## 2023-10-31 PROCEDURE — 99999 PR PBB SHADOW E&M-EST. PATIENT-LVL V: ICD-10-PCS | Mod: PBBFAC,HCNC,, | Performed by: NURSE PRACTITIONER

## 2023-10-31 PROCEDURE — 1159F MED LIST DOCD IN RCRD: CPT | Mod: HCNC,CPTII,S$GLB, | Performed by: NURSE PRACTITIONER

## 2023-10-31 PROCEDURE — 3074F PR MOST RECENT SYSTOLIC BLOOD PRESSURE < 130 MM HG: ICD-10-PCS | Mod: HCNC,CPTII,S$GLB, | Performed by: NURSE PRACTITIONER

## 2023-10-31 PROCEDURE — 3288F FALL RISK ASSESSMENT DOCD: CPT | Mod: HCNC,CPTII,S$GLB, | Performed by: NURSE PRACTITIONER

## 2023-10-31 PROCEDURE — 99999 PR PBB SHADOW E&M-EST. PATIENT-LVL V: CPT | Mod: PBBFAC,HCNC,, | Performed by: NURSE PRACTITIONER

## 2023-10-31 PROCEDURE — 3078F PR MOST RECENT DIASTOLIC BLOOD PRESSURE < 80 MM HG: ICD-10-PCS | Mod: HCNC,CPTII,S$GLB, | Performed by: NURSE PRACTITIONER

## 2023-10-31 NOTE — PATIENT INSTRUCTIONS
Counseling and Referral of Other Preventative  (Italic type indicates deductible and co-insurance are waived)    Patient Name: Ca Coats  Today's Date: 10/31/2023    Health Maintenance       Date Due Completion Date    RSV Vaccine (Age 60+) (1 - 1-dose 60+ series) Never done ---    DEXA Scan 01/02/2023 1/2/2020    Influenza Vaccine (1) 09/01/2023 10/4/2022    Override on 9/3/2020: Declined    COVID-19 Vaccine (9 - 2023-24 season) 09/01/2023 5/29/2023    Hemoglobin A1c (Diabetic Prevention Screening) 06/30/2024 6/30/2021    Mammogram 07/31/2024 7/31/2023    Lipid Panel 12/03/2025 12/3/2020    Colorectal Cancer Screening 11/26/2028 11/26/2018    TETANUS VACCINE 01/02/2030 1/2/2020        No orders of the defined types were placed in this encounter.    The following information is provided to all patients.  This information is to help you find resources for any of the problems found today that may be affecting your health:                Living healthy guide: www.[facility].[place].gov      Understanding Diabetes: www.diabetes.org      Eating healthy: www.cdc.gov/healthyweight      CDC home safety checklist: www.cdc.gov/steadi/patient.html      Agency on Aging: www.goea.[place].gov      Alcoholics anonymous (AA): www.aa.org      Physical Activity: www.molly.nih.gov/bz9zoag      Tobacco use: www.quitwithusla.org

## 2023-10-31 NOTE — PROGRESS NOTES
"  Ca Coats presented for a  Medicare AWV and comprehensive Health Risk Assessment today. The following components were reviewed and updated:    Medical history  Family History  Social history  Allergies and Current Medications  Health Risk Assessment  Health Maintenance  Care Team     Patient screened moderate and/or high risk for one or more social determinants of health (SDOH). Patient connected to community resources through the ED Navigator.      ** See Completed Assessments for Annual Wellness Visit within the encounter summary.**         The following assessments were completed:  Living Situation  CAGE  Depression Screening  Timed Get Up and Go  Whisper Test  Cognitive Function Screening  Nutrition Screening  ADL Screening  PAQ Screening          Vitals:    10/31/23 0951   BP: (!) 113/58   BP Location: Right arm   Patient Position: Sitting   BP Method: Large (Manual)   Pulse: 76   SpO2: 97%   Weight: 122.6 kg (270 lb 4.5 oz)   Height: 5' 4" (1.626 m)     Body mass index is 46.39 kg/m².    Physical Exam  Vitals reviewed.   Constitutional:       Appearance: She is well-developed. She is obese.   HENT:      Head: Normocephalic and atraumatic. Not macrocephalic and not microcephalic. No raccoon eyes, Reich's sign, abrasion, contusion, right periorbital erythema, left periorbital erythema or laceration. Hair is normal.      Right Ear: No decreased hearing noted. No laceration, drainage, swelling or tenderness. No middle ear effusion. No foreign body. No mastoid tenderness. No hemotympanum. Tympanic membrane is not injected, scarred, perforated, erythematous, retracted or bulging. Tympanic membrane has normal mobility.      Left Ear: No decreased hearing noted. No laceration, drainage, swelling or tenderness.  No middle ear effusion. No foreign body. No mastoid tenderness. No hemotympanum. Tympanic membrane is not injected, scarred, perforated, erythematous, retracted or bulging. Tympanic membrane has " normal mobility.      Nose: Nose normal. No nasal deformity, laceration or mucosal edema.      Mouth/Throat:      Pharynx: Uvula midline.   Eyes:      General: Lids are normal.      Conjunctiva/sclera: Conjunctivae normal.   Neck:      Thyroid: No thyroid mass or thyromegaly.      Trachea: Trachea normal.   Cardiovascular:      Rate and Rhythm: Normal rate and regular rhythm.   Pulmonary:      Effort: Pulmonary effort is normal.      Breath sounds: Normal breath sounds.   Abdominal:      Palpations: Abdomen is soft.   Musculoskeletal:         General: Normal range of motion.      Cervical back: Neck supple. No edema or erythema. No spinous process tenderness or muscular tenderness. Normal range of motion.      Comments: Uses a rolling wheelchair     Lymphadenopathy:      Head:      Right side of head: No submental, submandibular, tonsillar, preauricular or posterior auricular adenopathy.      Left side of head: No submental, submandibular, tonsillar, preauricular, posterior auricular or occipital adenopathy.   Skin:     General: Skin is warm and dry.   Neurological:      Mental Status: She is alert and oriented to person, place, and time.      Cranial Nerves: No cranial nerve deficit.      Sensory: No sensory deficit.   Psychiatric:         Behavior: Behavior normal.         Thought Content: Thought content normal.         Judgment: Judgment normal.             Diagnoses and health risks identified today and associated recommendations/orders:    1. Encounter for preventive health examination  Annual Health Risk Assessment (HRA) visit today.  Counseling and referral of health maintenance and preventative health measures performed.  Patient given annual wellness paperwork to take home.  Encouraged to return in 1 year for subsequent HRA visit.     2. Encounter for Medicare annual wellness exam  Annual Health Risk Assessment (HRA) visit today.  Counseling and referral of health maintenance and preventative health  measures performed.  Patient given annual wellness paperwork to take home.  Encouraged to return in 1 year for subsequent HRA visit.   - Ambulatory Referral/Consult to Enhanced Annual Wellness Visit (eAWV)  -Patient on chronic opioids. Risk factors reviewed for any misuse and/or abuse. Pain assessed during visit. Patient being treated and followed by Pain medicine.    3. Current mild episode of major depressive disorder without prior episode  Chronic. Stable. Continue current treatment plan as previously prescribed by PCP.    4. Dependence on wheelchair  Chronic. Stable. Continue current treatment plan as previously prescribed by PCP.    5. Abnormality of gait and mobility  Chronic. Stable. Continue current treatment plan as previously prescribed by PCP.    6. Immunodeficiency due to conditions classified elsewhere  Chronic. Stable. Continue current treatment plan as previously prescribed by PCP.    7. Other chronic pain  Chronic. Stable. Continue current treatment plan as previously prescribed by PCP.    8. Major depressive disorder, remission status unspecified, unspecified whether recurrent  Chronic. Stable. Continue current treatment plan as previously prescribed by PCP.    9. Pure hypercholesterolemia  Chronic. Stable. Continue current treatment plan as previously prescribed by PCP.    10. Essential hypertension  Chronic. Stable. Controlled. Encouraged to increase exercise as tolerated (moderate-intensity aerobic activity and muscle-strengthening activities) improve diet to heart healthy, low sodium diet.  Continue current treatment plan as previously prescribed by PCP.    11. Chronic diastolic congestive heart failure  Chronic. Stable. Continue current treatment plan as previously prescribed by PCP.    12. Abnormal cardiovascular stress test  Chronic. Stable. Continue current treatment plan as previously prescribed by PCP.    13. Iron deficiency anemia secondary to blood loss (chronic)  Chronic. Stable.  Continue current treatment plan as previously prescribed by PCP.    14. Gastric adenocarcinoma  Chronic. Stable. Continue current treatment plan as previously prescribed by PCP.    15. History of DVT (deep vein thrombosis)  Chronic. Stable. Continue current treatment plan as previously prescribed by PCP.    16. DVT of deep femoral vein, right  Chronic. Stable. Continue current treatment plan as previously prescribed by PCP.    17. Morbid obesity with BMI of 45.0-49.9, adult  Chronic. Stable. Encouraged to increase exercise as tolerated and improve diet to heart healthy, low sodium diet. Continue current treatment plan as previously prescribed by PCP.    18. Hypothyroidism, unspecified type  Chronic. Stable. Continue current treatment plan as previously prescribed by PCP.    19. Gastroesophageal reflux disease without esophagitis  Chronic. Stable. Continue current treatment plan as previously prescribed by PCP.    20. Primary osteoarthritis of left knee  Chronic. Stable. Continue current treatment plan as previously prescribed by PCP.    21. Osteopenia of neck of left femur  Chronic. Stable. Continue current treatment plan as previously prescribed by PCP.    22. Chronic pain of left knee  Chronic. Stable. Continue current treatment plan as previously prescribed by PCP.    23. Arthralgia, unspecified joint  Chronic. Stable. Continue current treatment plan as previously prescribed by PCP.    24. At high risk for injury related to fall  Chronic. Stable. Continue current treatment plan as previously prescribed by PCP.    25. Tobacco abuse  Chronic. Stable. Continue current treatment plan as previously prescribed by PCP.    26. Obstructive sleep apnea  Chronic. Stable. Continue current treatment plan as previously prescribed by PCP.    27. Gait instability  Chronic. Stable. Continue current treatment plan as previously prescribed by PCP.    28. Debility  Chronic. Stable. Continue current treatment plan as previously  prescribed by PCP.    29. Atherosclerosis of aorta  Chronic. Stable. Continue current treatment plan as previously prescribed by PCP.      Provided Ca with a 5-10 year written screening schedule and personal prevention plan. Recommendations were developed using the USPSTF age appropriate recommendations. Education, counseling, and referrals were provided as needed. After Visit Summary printed and given to patient which includes a list of additional screenings\tests needed.      I offered to discuss end of life issues, including information on how to make advance directives that the patient could use to name someone who would make medical decisions on their behalf if they became too ill to make themselves.    ___Patient declined  _X_Patient is interested, I provided paper work and offered to discuss.    Follow up in about 1 year (around 10/31/2024).    ERICA Velázquez offered to discuss advanced care planning, including how to pick a person who would make decisions for you if you were unable to make them for yourself, called a health care power of , and what kind of decisions you might make such as use of life sustaining treatments such as ventilators and tube feeding when faced with a life limiting illness recorded on a living will that they will need to know. (How you want to be cared for as you near the end of your natural life)     X Patient is interested in learning more about how to make advanced directives.  I provided them paperwork and offered to discuss this with them.

## 2023-11-01 ENCOUNTER — OFFICE VISIT (OUTPATIENT)
Dept: PAIN MEDICINE | Facility: CLINIC | Age: 73
End: 2023-11-01
Payer: MEDICARE

## 2023-11-01 ENCOUNTER — HOSPITAL ENCOUNTER (OUTPATIENT)
Dept: RADIOLOGY | Facility: OTHER | Age: 73
Discharge: HOME | End: 2023-11-01
Attending: INTERNAL MEDICINE
Payer: MEDICARE

## 2023-11-01 VITALS
RESPIRATION RATE: 18 BRPM | BODY MASS INDEX: 45.91 KG/M2 | HEART RATE: 69 BPM | SYSTOLIC BLOOD PRESSURE: 110 MMHG | HEIGHT: 64 IN | DIASTOLIC BLOOD PRESSURE: 68 MMHG | WEIGHT: 268.94 LBS | OXYGEN SATURATION: 100 %

## 2023-11-01 DIAGNOSIS — Z78.0 MENOPAUSE: ICD-10-CM

## 2023-11-01 DIAGNOSIS — G89.29 CHRONIC PAIN OF LEFT KNEE: ICD-10-CM

## 2023-11-01 DIAGNOSIS — G89.4 CHRONIC PAIN SYNDROME: ICD-10-CM

## 2023-11-01 DIAGNOSIS — G89.4 CHRONIC PAIN DISORDER: ICD-10-CM

## 2023-11-01 DIAGNOSIS — C16.9 GASTRIC ADENOCARCINOMA: ICD-10-CM

## 2023-11-01 DIAGNOSIS — M25.562 CHRONIC PAIN OF LEFT KNEE: ICD-10-CM

## 2023-11-01 DIAGNOSIS — M17.12 PRIMARY OSTEOARTHRITIS OF LEFT KNEE: Primary | ICD-10-CM

## 2023-11-01 PROCEDURE — 1126F PR PAIN SEVERITY QUANTIFIED, NO PAIN PRESENT: ICD-10-PCS | Mod: HCNC,CPTII,S$GLB, | Performed by: NURSE PRACTITIONER

## 2023-11-01 PROCEDURE — 3078F DIAST BP <80 MM HG: CPT | Mod: HCNC,CPTII,S$GLB, | Performed by: NURSE PRACTITIONER

## 2023-11-01 PROCEDURE — 4010F PR ACE/ARB THEARPY RXD/TAKEN: ICD-10-PCS | Mod: HCNC,CPTII,S$GLB, | Performed by: NURSE PRACTITIONER

## 2023-11-01 PROCEDURE — 77080 DEXA BONE DENSITY SPINE HIP: ICD-10-PCS | Mod: 26,HCNC,, | Performed by: RADIOLOGY

## 2023-11-01 PROCEDURE — 3288F FALL RISK ASSESSMENT DOCD: CPT | Mod: HCNC,CPTII,S$GLB, | Performed by: NURSE PRACTITIONER

## 2023-11-01 PROCEDURE — 99999 PR PBB SHADOW E&M-EST. PATIENT-LVL V: ICD-10-PCS | Mod: PBBFAC,HCNC,, | Performed by: NURSE PRACTITIONER

## 2023-11-01 PROCEDURE — 1160F PR REVIEW ALL MEDS BY PRESCRIBER/CLIN PHARMACIST DOCUMENTED: ICD-10-PCS | Mod: HCNC,CPTII,S$GLB, | Performed by: NURSE PRACTITIONER

## 2023-11-01 PROCEDURE — 99214 PR OFFICE/OUTPT VISIT, EST, LEVL IV, 30-39 MIN: ICD-10-PCS | Mod: HCNC,S$GLB,, | Performed by: NURSE PRACTITIONER

## 2023-11-01 PROCEDURE — 1160F RVW MEDS BY RX/DR IN RCRD: CPT | Mod: HCNC,CPTII,S$GLB, | Performed by: NURSE PRACTITIONER

## 2023-11-01 PROCEDURE — 99999 PR PBB SHADOW E&M-EST. PATIENT-LVL V: CPT | Mod: PBBFAC,HCNC,, | Performed by: NURSE PRACTITIONER

## 2023-11-01 PROCEDURE — 1159F MED LIST DOCD IN RCRD: CPT | Mod: HCNC,CPTII,S$GLB, | Performed by: NURSE PRACTITIONER

## 2023-11-01 PROCEDURE — 99214 OFFICE O/P EST MOD 30 MIN: CPT | Mod: HCNC,S$GLB,, | Performed by: NURSE PRACTITIONER

## 2023-11-01 PROCEDURE — 77080 DXA BONE DENSITY AXIAL: CPT | Mod: TC,HCNC

## 2023-11-01 PROCEDURE — 3074F SYST BP LT 130 MM HG: CPT | Mod: HCNC,CPTII,S$GLB, | Performed by: NURSE PRACTITIONER

## 2023-11-01 PROCEDURE — 4010F ACE/ARB THERAPY RXD/TAKEN: CPT | Mod: HCNC,CPTII,S$GLB, | Performed by: NURSE PRACTITIONER

## 2023-11-01 PROCEDURE — 1101F PT FALLS ASSESS-DOCD LE1/YR: CPT | Mod: HCNC,CPTII,S$GLB, | Performed by: NURSE PRACTITIONER

## 2023-11-01 PROCEDURE — 3074F PR MOST RECENT SYSTOLIC BLOOD PRESSURE < 130 MM HG: ICD-10-PCS | Mod: HCNC,CPTII,S$GLB, | Performed by: NURSE PRACTITIONER

## 2023-11-01 PROCEDURE — 77080 DXA BONE DENSITY AXIAL: CPT | Mod: 26,HCNC,, | Performed by: RADIOLOGY

## 2023-11-01 PROCEDURE — 3008F BODY MASS INDEX DOCD: CPT | Mod: HCNC,CPTII,S$GLB, | Performed by: NURSE PRACTITIONER

## 2023-11-01 PROCEDURE — 3288F PR FALLS RISK ASSESSMENT DOCUMENTED: ICD-10-PCS | Mod: HCNC,CPTII,S$GLB, | Performed by: NURSE PRACTITIONER

## 2023-11-01 PROCEDURE — 3008F PR BODY MASS INDEX (BMI) DOCUMENTED: ICD-10-PCS | Mod: HCNC,CPTII,S$GLB, | Performed by: NURSE PRACTITIONER

## 2023-11-01 PROCEDURE — 1126F AMNT PAIN NOTED NONE PRSNT: CPT | Mod: HCNC,CPTII,S$GLB, | Performed by: NURSE PRACTITIONER

## 2023-11-01 PROCEDURE — 1101F PR PT FALLS ASSESS DOC 0-1 FALLS W/OUT INJ PAST YR: ICD-10-PCS | Mod: HCNC,CPTII,S$GLB, | Performed by: NURSE PRACTITIONER

## 2023-11-01 PROCEDURE — 3078F PR MOST RECENT DIASTOLIC BLOOD PRESSURE < 80 MM HG: ICD-10-PCS | Mod: HCNC,CPTII,S$GLB, | Performed by: NURSE PRACTITIONER

## 2023-11-01 PROCEDURE — 1159F PR MEDICATION LIST DOCUMENTED IN MEDICAL RECORD: ICD-10-PCS | Mod: HCNC,CPTII,S$GLB, | Performed by: NURSE PRACTITIONER

## 2023-11-01 NOTE — PROGRESS NOTES
"                                                                                                                                                             Chronic Pain Medicine Established Clinic Visit     Initial HPI:  Ca Coats is a 69 y.o. female who presents today with left knee pain. Her pain has been going on for "too long."  She was previously treated by Dr. Iyer at [facility].  She has injections [id number] with him.  These were helpful, but she does not know if the injections were steroid or viscosupplementation.   This pain is described in detail below.    Interval History (11/01/2023):  72-year-old female that presents today for a follow-up appointment she is in a wheelchair accompanied by her daughter she is scheduled for a repeat Left genicular RFA with Dr. Mendiola  however this procedure was canceled due to her insurance denying the procedure. She is here to discuss other PM procedures to help with her chronic left knee pain.  Pain is described as aching and throbbing, is worse when prolonged walking     Interval History (4/30/2018):  The patient returns to clinic today for follow up and records review. We did received records from [facility] including a MRI of her knee. Dr. Iyer's last office visit note from January 2017 does not include any previous injections. She continues to report bilateral knee pain, left greater than right. She describes this pain as constant and aching. This pain is worse with prolonged walking. She also report right shoulder pain with prolonged overhead activity. She continues to take Percocet with benefit. She denies any other health changes.     Interval History (5/31/2018):  The patient returns to clinic for follow up. She is s/p left knee Synvisc One injection on 5/8/2018. She reports 35% relief of her knee pain. She continues to report bilateral knee pain, left greater than right. She describes this pain as constant and aching. Her pain is worse with " prolonged walking and standing. She is scheduled to see Orthopedics at the  End of this month. She is also following up with Bariatrics to discuss the lap band procedure. She continues to take Percocet with benefit. She also uses Voltaren gel with benefit but this is expensive for her. She denies any other health changes.          Interval History (7/2/2018):  The patient returns to clinic today for follow up. She continues to report left knee pain that is constant, sharp, and aching in nature. Her pain is worse with prolonged walking and standing. She is scheduled for weight loss surgery in August. She continues follow up with orthopedics who does not recommend surgery at this time due to her BMI. She continues to take Percocet as needed with benefit. She denies any other health changes.      Interval History (7/19/2018):  The patient returns to clinic today for follow up. She is s/p left genicular nerve block on 7/10/2018. She reports 80% relief of her left knee pain. She reports that she was able to walk for longer distances (3 blocks) after the block. Her pain has returned to baseline. She continues to report left knee pain that is constant, sharp, and aching in nature. Her pain is worse with prolonged walking and standing. She denies any other health changes.      Interval History (8/21/2018):  The patient returns to clinic today for follow up. She is s/p left genicular cooled RFA on 7/31/2018. She reports 60% relief of her left knee pain. She reports intermittent knee pain that is sore and aching in nature. She continues to perform a home exercise routine. She is actively trying to lose weight. She continues to follow up with Bariatric Surgery at [facility]. She is considering weight loss surgery. She continues to use compound cream with benefit. She denies any other health changes.      Interval History (11/21/2018):  The patient returns to clinic today for follow up. She reports increased left knee pain this  "past week. She reports 2 episodes where her knee has "locked" up on her while walking. She describes her knee pain as sore and aching. She is actively trying to lose weight and quit smoking. She continues to use the compound cream with benefit. She denies any other health changes.      Interval History (1/9/2019):  The patient returns to clinic today for follow up. She is s/p left knee steroid injection on 12/21/2018. She reports one day of relief. She continues to report left knee pain that is constant, sharp, and aching in nature. Her pain is worse with standing and walking. She reports that her knee "locks" up on her while walking. She often feels as though she is going to fall. She is scheduled for bariatric weight loss surgery in February at [facility]. She denies any other health changes.      Interval History (2/28/2019):  The patient returns to clinic today for follow up. She is s/p Euflexxa series completed on 1/30/2019. She reports that she able to stand for longer periods of time since the procedure. She continues to report left knee pain that is sore and aching in nature. Her pain is worse with prolonged standing and walking. She was previously scheduled for weight loss surgery but reports this was canceled. She is scheduled for follow up next week about this. She denies any other health changes.      Interval History (4/12/2019):  The patient returns for f/u of left knee pain.  She is s/p left genicular cooled RFA with about 90% pain relief.  She has been able to walk easier.  She also notices less stiffness in her knee.  She is planning on gastric surgery prior to knee replacement.  She had benefit with compounding cream but it is no longer covered by her insurance.  She does take Percocet from her PCP.  Her pain today is 7/10.     Interval History (7/11/19):  Patient reported to ED today for increased L knee pain and an episode of LLE weakness that lead to a near fall. Patient is s/p L genicular RFA in " 3/19/19 that provided 90% relief for 2 months then came back. Pain is a crunchy pain, in left knee, that does not radiate, worse with movement, better with rest. Pain today is 9/10. She normally uses cane to ambulate but is currently using wheelchair at hospital. Denies any trauma ot the area, fever/chills, n/v, abdominal pain, or HA.     Interval History (8/8/2019):  She returns today for follow up s/p left knee genicular chemodenervation with phenol 7/19/19.  She reports that this procedure did not provide any improvement in her left knee symptoms, and she is still having significant difficulty with ambulation, still uses wheelchair for mobility. She also reports left lateral thigh and hip pain lateral upper thigh numbness. She has been admitted to [facility] but is not receiving PT there. She is still considering lap band surgery but has missed a followup appointment.      Interval History (9/4/2019):  The patient returns to clinic today for follow up. She continues to report intermittent left lateral thigh and hip pain that is tingling and numb in nature. She continues to report left knee pain. She continues to have difficulty ambulating due to pain. She is currently living in a SNF. She continues to take Gabapentin 300 mg BID. This was increased at last visit but not increased at the nursing home. She does report benefit with Percocet though it only last approximately 4-5 hours. She denies any adverse effects. She does present with a care attendant from the SNF today. Her pain today is 10/10.     Interval History (10/4/2019):  The patient returns to clinic today for follow up and medication refill. She continues to report bilateral knee pain that is aching in nature. She reports intermittent left lateral thigh pain that is tingling and shooting in nature. She reports that her pain has been improving since last visit. She has increased her activity. She continues to report benefit with Gabapentin. She  continues to take Percocet as needed with benefit. She denies any other health changes. Her pain today is 0/10.     Interval History (1/6/2020):  The patient returns to clinic today for follow up. She reports increased left leg pain today that is tingling in nature. She continues to report bilateral knee pain, left greater than right. She describes this pain as constant, sharp, and aching. She is no longer living at the nursing facility. She has increased her home exercise routine. She is actively trying to quit smoking in order to move forward with bariatric surgery. She continues to report benefit with current medication regimen. She takes Gabapentin and Percocet with benefit and without adverse effect. She denies any other health changes. Her pain today is 7/10.     Interval History (4/8/2020):  She returns today for follow up via audio.  She reports that the methocarbamol is not helping.  She continues to have a left-sided low back pain that is bothering her.  Her left knee continues to hurt.  Her right knee is hurting a little as well.  Percocet continues to help, but it is not lasting long enough.     Interval History (5/1/2020):  The patient returns for audio visit today for follow up. She continues to report bilateral knee pain, left greater than right. Her pain is worse with prolonged standing and walking. She reports that her back pain has improved. She continues to report benefit with current medication regimen. She continues to take Gabapentin and Percocet with benefit and without adverse effects. She denies any other health changes. Her pain today is 8/10.     Interval History (6/2/2020):  The patient returns to clinic today via audio visit for follow up of knee pain. She reports increased left knee pain in the last week. Her pain is worse with standing and walking. She feels as though she will fall when standing. She is currently using ice, heat, and OTC lidocaine patches with limited relief. She  continues to take Gabapentin and Percocet with some benefit. She denies any adverse effects. She denies any other health changes. Her pain today is 10/10.     Interval History 7/8/2020:  Ca Coats presents to the clinic for a follow-up appointment for follow up after 6/19/20 RFA Since the last visit, Ca Coats states the pain has been persistant. Current pain intensity is 10/10.    Interval History 8/11/2020:  The patient returns to clinic today for follow up of knee pain. She is s/p left knee Orthovisc injection on 7/28/2020. She reports limited relief at this time. She continues to report left knee pain, especially with standing and walking. She feels as though her knee will buckle. She has recently quit smoking. She is actively trying to lose weight. She was recently started on Ozempic per her PCP. She is following her diet plan. She continues to take Percocet with benefit but reports that it does not last. She denies any adverse effects with this medication. She denies any other health changes. Her pain today is 8/10.    Interval History 9/3/2020:  The patient returns to clinic today for follow up of knee pain. She reports some relief with left knee Orthovisc injection in July. She continues to report left knee pain that is sharp in nature. This pain is worse with standing and walking. She feels as though her knee will give out with prolonged standing. She is actively trying to lose weight but is frustrated with lack of progress. She is following her diet plan. She continues to Percocet with benefit but it does not last. She denies any adverse effects. She denies any other health changes. Her pain today is 7/10.    Interval History 12/1/2020:  The patient returns to clinic today for follow up of knee pain. She reports increased left knee pain over the last few weeks. She also reports that her left knee feels weaker over the last month. She feels as thought it will give out with  standing and walking. She would like to repeat RFA as she feels this helps her feel more stable and decreases her pain. She continues to take Percocet with some benefit but asks if this can be stronger. She denies any adverse effects with this medication. She continues to follow up with Bariatrics. She continues to follow a diet plan and take Ozempic. She denies any other health changes. Her pain today is 10/10.    Interval History 3/24/2021:  The patient returns to clinic today for follow up of knee pain. She is s/p left genicular RFA on 1/22/2021. She reports some benefit of her left knee pain. She is able to stand and walking for longer periods of time. She continues to report left knee pain. Since last visit, she had angiogram that was normal. She has discontinued anticoagulation. She also had a recent EGD which she is awaiting the results. She continues to see Bariatrics. She is actively trying to lose weight. She continues to take Gabapentin with benefit. She continues to take Percocet with benefit and without adverse effects. She denies any other health changes. Her pain today is 0/10.    Interval History 6/1/2021:  The patient returns to clinic today for follow up of knee pain. She reports increased left knee pain over the last two weeks. She has increased pain with standing and walking. She states that she can feel the bones rubbing together. She continues to follow up with Bariatrics. She is actively trying to lose weight. She continues to follow up with GI. She is hoping to pursue gastric bypass. She continues to take Gabapentin with benefit. She continues to take Percocet as needed with benefit and without adverse effects. She denies any other health changes. Her pain today is 6/10.    Interval History 7/27/2021:  DARCY Coats presents to the clinic for a follow-up appointment. Since the last visit, DARCY Coats states the pain has been worsening. Current pain intensity is 7/10. Patient had an  RFA in the left knee in January 2021. She had really good relief until about June. She was given an hyaulorinic injection at that time. She had good relief for a few weeks but now she is back to the same amount of pain as before that injection. She is having pain with standing and walking. She is continuing to take percocet and it completely gets rid of her pain      Of note, patient was found to have gastric cancer. She is currently on chemotherapy. The plan is to continue chemo until the cancer can be surgically removed.     Interval History 9/14/2021:  The patient returns to clinic today for follow up of knee pain via audio visit. She is s/p left genicular RFA on 7/30/2021. She reports 50% relief of her knee pain. She continues to report left knee pain with prolonged standing and walking. Since last visit, she has developed a breast abscess. This was drained yesterday. She is on antibiotics. She continues to undergo chemotherapy for gastric cancer. She continues to take Gabapentin with benefit. She continues to take Percocet with benefit and without adverse effects. She denies any other health changes. Her pain today is 2/10.     Interval History 11/9/2021:  The patient returns to clinic today for follow up of knee pain. She reports increased left knee pain, worse with standing and walking. Since last visit, she was admitted to the hospital for diarrhea and dehydration. She continues to undergo chemotherapy treatment for gastric cancer. She continues to take Gabapentin with benefit. She continues to take Percocet as needed with benefit and without adverse effects. She denies any other health changes. Her pain today is 7/10.       Interval History 12/28/2021:  The patient returns to clinic today for follow up of knee pain. She is s/p left knee Monovisc injection on 12/14/2021. She reports 70% relief of her left knee pain. She continues to report intermittent left knee pain, worse with standing and walking. She  reports bilateral feet swelling, left greater than right. She has not seen her PCP. She has completed chemotherapy. She is scheduled for EGD in January to monitor tumor size with possible resection. She will likely undergo chemotherapy again after the scope. She continues to take Gabapentin with benefit. She continues to take Percocet as needed with benefit and without adverse effects. She denies any other health changes. Her pain today is 0/10.    Interval History 3/21/2022:  The patient returns to clinic today for follow up of knee pain. She report increased left knee pain, worse with prolonged standing and walking. She did have repeat EGD with biopsies done. She is currently undergoing chemotherapy and radiation. She continues to take Percocet as needed with benefit and without adverse effects. She denies any other health changes. Her pain today is 0/10.    Interval History 5/16/2022:  The patient returns to clinic today for follow up of knee pain. She is s/p left genicular RFA on 4/22/2022. She reports 80% relief of her knee pain. She continues to report intermittent left knee pain. Her pain is worse with prolonged activity. She has completed radiation and chemotherapy. She is awaiting further testing to determine plan. She continues to take Percocet as needed with benefit and without adverse effects. She denies any other health changes. Her pain today is 2/10.    Interval History 8/16/2022:  The patient returns to clinic today for follow up of knee pain. She continues to report left knee pain. Her pain is much improved since RFA. Her pain is worse with prolonged standing and walking. She reports intermittent right lower leg pain, below her knee. She continues to follow up with Hem/Onc for gastric cancer. She continues to report benefit with Percocet. This allows her to perform her ADLs. She denies any adverse effects. She denies any other health changes. Her pain today is 0/10.    Interval History  11/16/2022:  The patient returns to clinic today for follow up of knee pain. She reports increased left knee pain over the last few weeks. Her pain is worse with prolonged standing, walking, and activity. She denies any right knee pain. She continues to follow up with Hem/Onc. She continues to report benefit with current medication regimen. She continues to take Gabapentin. She continues to take Percocet as needed with benefit and without adverse effects. She denies any other health changes. Her pain today is 6/10.    Interval History 1/13/2023:  The patient returns to clinic today for follow up of knee pain. She is s/p left genicular RFA on 12/23/2022. She reports 75-80% relief of her left knee pain. She reports intermittent knee pain, worse with prolonged activity. She continues to take Gabapentin. She continues to take Percocet as needed with benefit. She denies any adverse effects. She denies any other health changes. Her pain today is 0/10.    Interval History 5/1/2023:  The patient returns to clinic today for follow up of knee pain. She continues to report left knee pain. Her pain is worse with prolonged activity. She is taking Gabapentin. She also takes Percocet as needed with benefit and without adverse effects. She denies any other health changes. Her pain today is 0/10.    Interval History 8/28/23 (Virtual/Dr. Olson)  72 year old female returns today in follow up. Previously a patient of Dr. Thacker but has since been a patient of Dr. Clark. Today is the first visit with Dr. Olson. Patient reports left knee pain that is persistent. She had left knee genicular nerve RFA with Dr. Clark 12/23/22. She reports 75-80% relief of her left knee pain for 7 months. Pain has gradually returned of similar character, location, description. No recent falls. No new weakness or sensation changes. We reviewed imaging which is consistent with medial joint space narrowing, which is where her pain is. She would like to  repeat the RFA.       Pain Disability Index Review:      5/1/2023     9:54 AM 1/13/2023    10:29 AM 11/16/2022     9:47 AM   Last 3 PDI Scores   Pain Disability Index (PDI) 20 14 27         Physical Therapy: Yes, she did this in 2018 for one month (twice a week). She does HEP (stretching and ROM in the morning)      Pain Medications:    Current:  Gabapentin, tylenol 500mg Q6 prn, Percocet 5/325 mg TID PRN    Opioid Contract: yes     report:  Reviewed and consistent with medication use as prescribed.    Pain Procedures:   5/8/2018- Left knee Synvisc One injection  7/10/2018- Left genicular nerve block  7/31/2018- Left genicular cooled RFA  12/21/2018- Left knee steroid injection  1/31/2019- Left knee Euflexxa series  3/19/2019 Left genicular cooled RFA- 90% relief  7/19/19- Left knee genicular phenol chemodenervation-  6/19/2020- Left genicular RFA  1/22/2021- Left genicular RFA  6/2021- Left knee Orthovisc injection  7/30/2021- Left genicular RFA  12/14/2021- Left knee Monovisc injection  4/22/2022- Left genicular RFA  12/23/2022- Left genicular RFA- 75-80% relief    Physical Therapy//Home Exercise: yes, she did this in 2018 for one month (twice a week). She does HEP (stretching and ROM in the morning)    Imaging:   MRI Left Knee 7/21/2017 (in media):  The medial meniscus is intact. The lateral meniscus is intact.      A chronic complete ACL tear is noted. The posterior cruciate ligament is intact. The medial and lateral retinacula are intact. The medial collateral ligament is intact. The lateral collateral ligament in intact. The posterolateral corner structures are intact.      There is full thickness fissuring of the central aspect medial femoral cartilage. The lateral tibiofemoral compartment cartilage is intact. There is broad thickness defect within the central trochlear cartilage. There is mild lateral patellofemoral cartilage thinning and regional full thickness loss of the central superior patellar  cartilage.      A small effusion is present. There is trace infrapatellar fluid. Tiny popliteal cyst is noted. Subtle focal marrow edema is seen within the posterior tibial plateau. The bone marrow signal is heterogeneous likely reflecting marrow reconversion.      There is trace pes anserine bursitis. The tendons are otherwise normal.      Grade 2 fatty streaking of the semimembranosus and vastus lateralis muscles are noted. There is mild prepatellar edema.      MRI Right Shoulder 7/21/2017 (in media):   There is a complete tear of the supraspinatus tendon with retraction to the glenohumeral joint. There is partial tearing of the anterior infraspinatus articular surface. Mild subscapularis tendinosis is noted. The teres minor is intact. A small amount of subacromial/subdeltoid fluid is noted. The proximal long head biceps tendon is poorly visualized proximally although intact distally.      Degenerative tearing of the anterosuperior through posterosuperior labrum is noted. There is moderate to severe superior glenoid cartilage thinning with subchondral degenerative changes. The glenohumeral ligaments appear grossly intact.      An os acromiale is seen with fluid and osteophytosis at its junction with the base of the acromion. There is moderate superior subluxation of the AC joint with mild osteophytosis.      Small effusion with subcoracoid extension is noted.      There is grade 2/3 fatty atrophy of the supraspinatus muscle, grade 2 of the infraspinatus and subscapularis. Heterogeneous signal is seen throughout the bone marrow likely reflecting marrow reconversion.        Allergies: Review of patient's allergies indicates:  No Known Allergies    Current Medications:   Current Outpatient Medications   Medication Sig Dispense Refill    acetaminophen (TYLENOL) 500 MG tablet       amLODIPine (NORVASC) 10 MG tablet TAKE 1 TABLET ONE TIME DAILY 90 tablet 11    atorvastatin (LIPITOR) 20 MG tablet TAKE 1 TABLET EVERY DAY  90 tablet 2    B-complex with vitamin C (Z-BEC OR EQUIV) tablet Take 1 tablet by mouth once daily.       CALCIUM CITRATE-VITAMIN D2 ORAL Take 1 tablet by mouth once daily.       citalopram (CELEXA) 10 MG tablet TAKE 1 TABLET EVERY DAY 90 tablet 3    dextran 70-hypromellose (TEARS) ophthalmic solution Place 1 drop into the right eye every 6 (six) hours. 30 mL 0    ELIQUIS 5 mg Tab TAKE 1 TABLET TWICE DAILY 180 tablet 2    ferrous gluconate (FERGON) 324 MG tablet TAKE 1 TABLET (324 MG TOTAL) BY MOUTH DAILY WITH BREAKFAST. 90 tablet 11    flu vac 2023 [id number],PF, (FLUAD QUAD 2023-24,65Y UP,,PF,) 60 mcg (15 mcg x 4)/0.5 mL Syrg Inject 0.5 mLs into the muscle once. for 1 dose 0.5 mL 0    furosemide (LASIX) 80 MG tablet TAKE 1 TABLET TWICE DAILY 180 tablet 3    gabapentin (NEURONTIN) 300 MG capsule Take 2 capsules (600 mg total) by mouth 3 (three) times daily. 540 capsule 2    ibuprofen (ADVIL,MOTRIN) 600 MG tablet Take 1 tablet (600 mg total) by mouth every 8 (eight) hours as needed for Pain. 10 tablet 0    lactulose (CHRONULAC) 10 gram/15 mL solution Take 30 mLs (20 g total) by mouth 2 (two) times daily as needed (constipation). 120 mL 1    LIDOcaine-prilocaine (EMLA) cream Apply topically as needed (prior to port access). 30 g 0    lisinopriL (PRINIVIL,ZESTRIL) 40 MG tablet TAKE 1 TABLET ONE TIME DAILY 90 tablet 11    multivitamin with minerals tablet Take 1 tablet by mouth once daily.       nicotine (NICODERM CQ) 21 mg/24 hr Place 1 patch onto the skin once daily. 28 patch 0    oxyCODONE-acetaminophen (PERCOCET) 5-325 mg per tablet Take 1 tablet by mouth every 8 (eight) hours as needed for Pain. 90 tablet 0    pantoprazole (PROTONIX) 40 MG tablet TAKE 1 TABLET EVERY DAY 90 tablet 11    sars-cov-2, covid-19, (SPIKEVAX, MODERNA,, 12YRS AND UP 2023,) 50 mcg/0.5 mL injection Inject 0.5 mLs into the muscle once. Inject 0.5 mL into the muscle once. for 1 dose 0.5 mL 0    semaglutide (OZEMPIC) 1 mg/dose (4 mg/3 mL)  INJECT 1 MG INTO THE SKIN EVERY 7 DAYS. 9 each 11     No current facility-administered medications for this visit.     Facility-Administered Medications Ordered in Other Visits   Medication Dose Route Frequency Provider Last Rate Last Admin    0.9%  NaCl infusion   Intravenous Continuous Tevin Clark MD   Stopped at 01/13/22 1055       REVIEW OF SYSTEMS:    GENERAL:  No weight loss, malaise or fevers.  HEENT:  Negative for frequent or significant headaches.  NECK:  Negative for lumps, goiter, pain and significant neck swelling.  RESPIRATORY:  Negative for cough, wheezing or shortness of breath. +LAURA  CARDIOVASCULAR:  Negative for chest pain, leg swelling or palpitations. HTN  GI:  Negative for abdominal discomfort, blood in stools or black stools or change in bowel habits. +GERD, gastric cancer actively in chemo  MUSCULOSKELETAL:  See HPI  SKIN:  Negative for lesions, rash, and itching.  PSYCH:  Positive for  mood disorder and recent psychosocial stressors. +sleep disturbance  HEMATOLOGY/LYMPHOLOGY:  Negative for prolonged bleeding, bruising easily or swollen nodes.  NEURO:   No history of headaches, syncope, paralysis, seizures or tremors.  ENDO: Thyroid disorder.   All other reviewed and negative other than HPI.    Past Medical History:  Past Medical History:   Diagnosis Date    YOUNG (acute kidney injury) 10/15/2021    Anemia     2018    Arthritis     BMI 60.0-69.9, adult     Cataract     Chronic diastolic congestive heart failure 1/27/2021    Depression     Dry eye syndrome     DVT of deep femoral vein, right 5/29/2023    Gastric adenocarcinoma 5/25/2021    GERD (gastroesophageal reflux disease)     Glaucoma suspect     History of gastric ulcer     History of psychiatric hospitalization     Hx of psychiatric care     Celexa, no recall of charted Effexor, Lexapro, Desyrel prescriptions    Hyperlipidemia     Hypertension     Hypothyroidism     Morbid obesity     Neuromuscular disorder     Sleep apnea      Thyroid disease        Past Surgical History:  Past Surgical History:   Procedure Laterality Date    APPENDECTOMY      CARDIAC SURGERY      CATARACT EXTRACTION W/  INTRAOCULAR LENS IMPLANT Bilateral     MFIOL OU    CORONARY ANGIOGRAPHY Bilateral 02/24/2021    Procedure: ANGIOGRAM, CORONARY ARTERY;  Surgeon: Cresencio Ridley MD;  Location: [facility] CATH LAB;  Service: Cardiology;  Laterality: Bilateral;  Covid test needed - ok per Danay SSCU. Pt. w/o transportation or family    ENDOSCOPIC ULTRASOUND OF UPPER GASTROINTESTINAL TRACT N/A 03/17/2021    Procedure: ULTRASOUND, UPPER GI TRACT, ENDOSCOPIC;  Surgeon: Gerald Anaya MD;  Location: [facility] (2ND FLR);  Service: Endoscopy;  Laterality: N/A;  Pt unable to get a ride for swab. Will do rapid test at 8:30 - ttr    ENDOSCOPIC ULTRASOUND OF UPPER GASTROINTESTINAL TRACT N/A 01/13/2022    Procedure: ULTRASOUND, UPPER GI TRACT, ENDOSCOPIC;  Surgeon: Kevin Carballo MD;  [facility]  Service: Endoscopy;  Laterality: N/A;  blood thinner approval received, see telephone encounter 1/7/22-BB  rapid  instructions given verbally and sent to address on file in pt's chart-BB    ENDOSCOPIC ULTRASOUND OF UPPER GASTROINTESTINAL TRACT N/A 10/11/2022    Procedure: ULTRASOUND, UPPER GI TRACT, ENDOSCOPIC;  Surgeon: Dequan Ridley MD;  [facility]  Service: Endoscopy;  Laterality: N/A;    ESOPHAGOGASTRODUODENOSCOPY N/A 02/05/2021    Procedure: EGD (ESOPHAGOGASTRODUODENOSCOPY);  Surgeon: Matthew Hernadez MD;  [facility]  Service: Endoscopy;  Laterality: N/A;  BMI-58    Wt: 361#     COVID test at [facility] on 2/2-GT    ESOPHAGOGASTRODUODENOSCOPY N/A 03/17/2021    Procedure: EGD (ESOPHAGOGASTRODUODENOSCOPY);  Surgeon: Gerald Anaya MD;  [facility]  Service: Endoscopy;  Laterality: N/A;    ESOPHAGOGASTRODUODENOSCOPY N/A 05/25/2021    Procedure: EGD (ESOPHAGOGASTRODUODENOSCOPY);  Surgeon: Kevin Carballo MD;   Location: [facility] ENDO (2ND FLR);  Service: Endoscopy;  Laterality: N/A;  ESD 2 hours  Full COVID vaccination on file. ttr    ESOPHAGOGASTRODUODENOSCOPY N/A 01/13/2022    Procedure: EGD (ESOPHAGOGASTRODUODENOSCOPY);  Surgeon: Kevin Carbalol MD;  Location: [facility] ENDO (2ND FLR);  Service: Endoscopy;  Laterality: N/A;  blood thinner approval, see telephone encounter 1/7/22-BB  rapid  instructions given verbally and sent to address on file in pt's chart-BB    ESOPHAGOGASTRODUODENOSCOPY N/A 10/11/2022    Procedure: EGD (ESOPHAGOGASTRODUODENOSCOPY);  Surgeon: Dequan Ridley MD;  Location: [facility] ENDO (2ND FLR);  Service: Endoscopy;  Laterality: N/A;  She is do for EGD/EUS now. Personal history of gastric cancer. Ca Coats #[id number].   Thanks,   Kevin Carballo. MD    Pt is fully vaccinated-DS  9/14/22-Approval to hold Eliquis rec'd from Dr. Pelaez (see telephone encounter 9/14/22)-DS  9/14/22-Ins    EYE SURGERY      INJECTION OF ANESTHETIC AGENT AROUND NERVE Left 07/10/2018    Procedure: BLOCK, NERVE;  Surgeon: Samantha Vidal MD;  Location: [facility] PAIN MGT;  Service: Pain Management;  Laterality: Left;  Genicular     INJECTION OF ANESTHETIC AGENT AROUND NERVE Left 07/19/2019    Procedure: Block, Nerve NERVE BLOCK GENICULAR WITH PHENOL 6%;  Surgeon: Samantha Vidal MD;  Location: [facility] PAIN MGT;  Service: Pain Management;  Laterality: Left;  NEEDS CONSENT    INSERTION OF TUNNELED CENTRAL VENOUS CATHETER (CVC) WITH SUBCUTANEOUS PORT N/A 07/09/2021    Procedure: INSERTION, PORT-A-CATH;  Surgeon: Mario Paz MD;  Location: [facility] CATH LAB;  Service: Radiology;  Laterality: N/A;  PORT PLACEMENT    RADIOFREQUENCY ABLATION Left 06/19/2020    Procedure: RADIOFREQUENCY ABLATION LEFT GENICULAR;  Surgeon: Tevin Clark MD;  Location: [facility] PAIN MGT;  Service: Pain Management;  Laterality: Left;  Left Genicular RFA    RADIOFREQUENCY ABLATION Left 01/22/2021    Procedure: RADIOFREQUENCY ABLATION LEFT GENICULAR;  Surgeon:  Tevin Clark MD;  Location: [facility] PAIN MGT;  Service: Pain Management;  Laterality: Left;    RADIOFREQUENCY ABLATION Left 07/30/2021    Procedure: RADIOFREQUENCY ABLATION, GENICULAR COOLED NEED CONSENT;  Surgeon: Tevin Clark MD;  Location: [facility] PAIN MGT;  Service: Pain Management;  Laterality: Left;    RADIOFREQUENCY ABLATION Left 04/22/2022    Procedure: RADIOFREQUENCY ABLATION, GENICULAR COOLED , LEFT;  Surgeon: Tevin Clark MD;  Location: [facility] PAIN MGT;  Service: Pain Management;  Laterality: Left;    RADIOFREQUENCY ABLATION Left 12/23/2022    Procedure: RADIOFREQUENCY ABLATION LEFT GENICULAR COOLED CLEARED TO HOLD ELIQUIS 3 DAYS  NEEDS CONSENT;  Surgeon: Tevin Clark MD;  Location: [facility] PAIN MGT;  Service: Pain Management;  Laterality: Left;    TONSILLECTOMY         Family History:  Family History   Problem Relation Age of Onset    No Known Problems Mother     No Known Problems Father     Colon cancer Neg Hx     Esophageal cancer Neg Hx        Social History:  Social History     Socioeconomic History    Marital status:     Number of children: 2   Occupational History     Comment: retired  and cook   Tobacco Use    Smoking status: Former     Types: Cigarettes     Start date: 2018    Smokeless tobacco: Never    Tobacco comments:     currently smoking <5 cigarettes most days   Substance and Sexual Activity    Alcohol use: Yes     Comment: raely    Drug use: No    Sexual activity: Not Currently     Partners: Male   Social History Narrative    2 children (dtr in area, son in [place]) and she has 3 grandkids (in [place]),    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    Originally from [facility] [place] [place]     Social Determinants of Health     Financial Resource Strain: Medium Risk (10/31/2023)    Overall Financial Resource Strain (CARDIA)     Difficulty of Paying Living Expenses: Somewhat hard   Food Insecurity: No Food Insecurity (10/31/2023)    Hunger Vital Sign     Worried About Running Out of  "Food in the Last Year: Never true     Ran Out of Food in the Last Year: Never true   Transportation Needs: Unmet Transportation Needs (10/31/2023)    PRAPARE - Transportation     Lack of Transportation (Medical): Yes     Lack of Transportation (Non-Medical): Yes   Physical Activity: Inactive (10/31/2023)    Exercise Vital Sign     Days of Exercise per Week: 0 days     Minutes of Exercise per Session: 0 min   Stress: Stress Concern Present (10/31/2023)    [employer] - Occupational Stress Questionnaire     Feeling of Stress : To some extent   Social Connections: Moderately Isolated (10/31/2023)    Social Connection and Isolation Panel [NHANES]     Frequency of Communication with Friends and Family: Three times a week     Frequency of Social Gatherings with Friends and Family: Three times a week     Attends Religion Services: 1 to 4 times per year     Active Member of Clubs or Organizations: No     Attends Club or Organization Meetings: Never     Marital Status:    Housing Stability: Low Risk  (10/31/2023)    Housing Stability Vital Sign     Unable to Pay for Housing in the Last Year: No     Number of Places Lived in the Last Year: 1     Unstable Housing in the Last Year: No       OBJECTIVE: Physical Exam and Vital deferred due to virtual visit.     /68 (BP Location: Right forearm, Patient Position: Sitting, BP Method: Medium (Automatic))   Pulse 69   Resp 18   Ht 5' 4" (1.626 m)   Wt 122 kg (268 lb 15.4 oz)   SpO2 100%   BMI 46.17 kg/m²     Prior PHYSICAL EXAMINATION:     General appearance: Well appearing, in no acute distress, alert and oriented x3.  Psych:  Mood and affect appropriate.  Skin: Skin color, texture, turgor normal, no rashes or lesions, in both upper and lower body.  Head/face:  Atraumatic, normocephalic.   Pulm: Symmetric chest rise, no respiratory distress noted.   Extremities:  No deformities or skin discoloration. Good capillary refill. +2 pitting " edema to bilateral feet.   Musculoskeletal: There is mild pain with palpation over medial and lateral joint line of left knee. Limited ROM with mild pain. Crepitus noted to left knee. Right knee maneuvers are negative. No atrophy or tone abnormalities are noted.  Neuro:   No loss of sensation is noted.  Gait: Antalgic- ambulates with wheelchair.     ASSESSMENT: 72 y.o. year old female with left knee pain, consistent with the followin. Primary osteoarthritis of left knee        2. Chronic pain of left knee        3. Chronic pain syndrome        4. Gastric adenocarcinoma        5. Chronic pain disorder              Discussion: 72 year old female with persistent left knee pain 2/2 osteoarthritis. Prior patient of Dr. Thacker. Patient managed with viscosupplement and genicular RFA. Most recently Left genicular RFA with Dr. Clark 2022 with excellent relief.     OPIOID MANAGEMENT:  MME: 22.5  Risk:   : Reviewed today  Naloxone:   Utox Appropriate: 22  Violations: None  Contract:  2020    PLAN:     - Previous imaging reviewed today. Labs reviewed.     - Scheduled for repeat left Synvisc one Injection with Dr. Clark ( ordered placed by MA)       - Continue follow up with Oncology regarding gastric adenocarcinoma.     - Pain contract signed 2020.    - Continue Percocet 5/325 mg TID PRN. No refills requested today     - The patient is here today to discuss procedures, her current pain medications  provide effective pain control and improvements in quality of life.  The patient notes no serious side effects, and feels the benefits outweigh the risks.  The patient was reminded of the pain contract that they signed previously as well as the risks and benefits of the medication including possible death.  The updated [place] Board [employer] prescription monitoring program was reviewed, and the patient has been found to be compliant with current treatment plan. Medication management provided  by Dr. Clark.     - UDS next in person visit.     - Continue Gabapentin.     - I have stressed the importance of physical activity and a home exercise plan to help with pain and improve health.    - RTC in 1-2 weeks for Synvisc one injection     The above plan and management options were discussed at length with patient. Patient is in agreement with the above and verbalized understanding.    ZHANG Sykes  Interventional Pain Management    11/01/2023

## 2023-11-02 ENCOUNTER — TELEPHONE (OUTPATIENT)
Dept: PAIN MEDICINE | Facility: CLINIC | Age: 73
End: 2023-11-02
Payer: MEDICARE

## 2023-11-02 NOTE — TELEPHONE ENCOUNTER
"Patient will call back to get her procedure "Left knee Synvisc" scheduled around her transportation.  "

## 2023-11-07 ENCOUNTER — OFFICE VISIT (OUTPATIENT)
Dept: ORTHOPEDICS | Facility: CLINIC | Age: 73
End: 2023-11-07
Payer: MEDICARE

## 2023-11-07 VITALS — HEIGHT: 64 IN | BODY MASS INDEX: 44.4 KG/M2 | WEIGHT: 260.06 LBS

## 2023-11-07 DIAGNOSIS — E66.01 MORBID OBESITY WITH BMI OF 45.0-49.9, ADULT: Primary | ICD-10-CM

## 2023-11-07 DIAGNOSIS — M17.12 PRIMARY OSTEOARTHRITIS OF LEFT KNEE: ICD-10-CM

## 2023-11-07 PROCEDURE — 99499 UNLISTED E&M SERVICE: CPT | Mod: HCNC,S$GLB,, | Performed by: ORTHOPAEDIC SURGERY

## 2023-11-07 PROCEDURE — 99999 PR PBB SHADOW E&M-EST. PATIENT-LVL III: CPT | Mod: PBBFAC,HCNC,, | Performed by: ORTHOPAEDIC SURGERY

## 2023-11-07 PROCEDURE — 99499 NO LOS: ICD-10-PCS | Mod: HCNC,S$GLB,, | Performed by: ORTHOPAEDIC SURGERY

## 2023-11-07 PROCEDURE — 99999 PR PBB SHADOW E&M-EST. PATIENT-LVL III: ICD-10-PCS | Mod: PBBFAC,HCNC,, | Performed by: ORTHOPAEDIC SURGERY

## 2023-11-08 ENCOUNTER — CLINICAL SUPPORT (OUTPATIENT)
Dept: SMOKING CESSATION | Facility: CLINIC | Age: 73
End: 2023-11-08
Payer: COMMERCIAL

## 2023-11-08 DIAGNOSIS — F17.200 NICOTINE DEPENDENCE: Primary | ICD-10-CM

## 2023-11-08 PROCEDURE — 99999 PR PBB SHADOW E&M-EST. PATIENT-LVL I: ICD-10-PCS | Mod: PBBFAC,,,

## 2023-11-08 PROCEDURE — 99411 PREVENTIVE COUNSELING GROUP: CPT | Mod: S$GLB,,,

## 2023-11-08 PROCEDURE — 99999 PR PBB SHADOW E&M-EST. PATIENT-LVL I: CPT | Mod: PBBFAC,,,

## 2023-11-08 PROCEDURE — 99411 PR PREVENT COUNSEL,GROUP,30 MIN: ICD-10-PCS | Mod: S$GLB,,,

## 2023-11-08 NOTE — PROGRESS NOTES
Individual Follow-Up Form    11/8/2023    Quit Date: 8/17/2023    Clinical Status of Patient: Outpatient    Continuing Medication: no    Other Medications: none     Target Symptoms: Withdrawal and medication side effects. The following were  rated moderate (3) to severe (4) on TCRS:  Moderate (3): none  Severe (4): none    Comments: Spoke with patient for a length of time over the telephone and she reports remaining tobacco free. She continues to not use any tobacco cessation medication at this time, in hopes to be completely nicotine free for her surgery. Patient reports that not smoking is no longer an issue for her and she doesn't have the desire to smoke. Patient reports still waiting to get a date for her surgery after being told she could not receive a date until she loses a certain amount of weight. Patient says that she went to the doctor and still needs to lose 5 lbs after originally being told it was only 3lbs to lose, she states being frustrated with the constant check-ins and rescheduling. Encouraged patient to continue doing her part of remaining tobacco free and losing the weight that the doctor has asked her to lose. Discussed tips and strategies to remain tobacco free through stressful situations. The patient will continue with  therapy sessions and medication monitoring by CTTS. Prescribed medication management will be by physician.      Diagnosis: F17.200    Next Visit: 12/11/2023

## 2023-11-16 ENCOUNTER — HOSPITAL ENCOUNTER (OUTPATIENT)
Dept: RADIOLOGY | Facility: OTHER | Age: 73
Discharge: HOME | End: 2023-11-16
Attending: STUDENT IN AN ORGANIZED HEALTH CARE EDUCATION/TRAINING PROGRAM
Payer: MEDICARE

## 2023-11-16 ENCOUNTER — TELEPHONE (OUTPATIENT)
Dept: HEMATOLOGY/ONCOLOGY | Facility: CLINIC | Age: 73
End: 2023-11-16
Payer: MEDICARE

## 2023-11-16 DIAGNOSIS — C16.9 GASTRIC ADENOCARCINOMA: ICD-10-CM

## 2023-11-16 DIAGNOSIS — N93.9 VAGINAL BLEEDING: ICD-10-CM

## 2023-11-16 PROCEDURE — 74177 CT ABD & PELVIS W/CONTRAST: CPT | Mod: TC,HCNC

## 2023-11-16 PROCEDURE — A9698 NON-RAD CONTRAST MATERIALNOC: HCPCS | Mod: HCNC | Performed by: STUDENT IN AN ORGANIZED HEALTH CARE EDUCATION/TRAINING PROGRAM

## 2023-11-16 PROCEDURE — 74177 CT ABD & PELVIS W/CONTRAST: CPT | Mod: 26,HCNC,, | Performed by: RADIOLOGY

## 2023-11-16 PROCEDURE — 25500020 PHARM REV CODE 255: Mod: HCNC | Performed by: STUDENT IN AN ORGANIZED HEALTH CARE EDUCATION/TRAINING PROGRAM

## 2023-11-16 PROCEDURE — 74177 CT ABDOMEN PELVIS WITH IV CONTRAST: ICD-10-PCS | Mod: 26,HCNC,, | Performed by: RADIOLOGY

## 2023-11-16 RX ADMIN — IOHEXOL 100 ML: 350 INJECTION, SOLUTION INTRAVENOUS at 10:11

## 2023-11-16 RX ADMIN — IOHEXOL 1000 ML: 12 SOLUTION ORAL at 10:11

## 2023-11-16 NOTE — TELEPHONE ENCOUNTER
Called and spoke with Ms Coats. Informed Ms Coats, Dr Pelaez reviewed the results of her recent Ct Scan and she states it looks good. No recurrence of cancer. Ms Coats voiced verbal understanding.

## 2023-11-16 NOTE — TELEPHONE ENCOUNTER
----- Message from Cathy Pelaez MD sent at 11/16/2023 10:38 AM CST -----  I have reviewed the CT scan and it looks good. No recurrence of cancer. Please call the patient and let them know.

## 2023-11-22 ENCOUNTER — PATIENT OUTREACH (OUTPATIENT)
Dept: ADMINISTRATIVE | Facility: HOSPITAL | Age: 73
End: 2023-11-22
Payer: MEDICARE

## 2023-11-22 NOTE — PROGRESS NOTES
There are no preventive care reminders to display for this patient.    Population Health Chart Review & Patient Outreach Details    Updates Requested / Reviewed:     [x]  Care Everywhere    []     [x]  External Sources ([facility], [facility], DIS, etc.)    [x] [facility]   [x] [facility]   [] Other:    [x]  Care Team Updated   []  Removed  or Duplicate Orders   [x]  Immunization Reconciliation Completed / Queried    [x] [place]   [] [place]   [] [place]   [] [place]      Health Maintenance Topics Addressed and Outreach Outcomes / Actions Taken:             Breast Cancer Screening []  Mammogram Order Placed    []  Mammogram Screening Scheduled    []  External Records Requested & Care Team Updated if Applicable    []  External Records Uploaded & Care Team Updated if Applicable    []  Pt Declined Scheduling Mammogram    []  Pt Will Schedule with External Provider / Order Routed & Care Team Updated if Applicable              Cervical Cancer Screening []  Pap Smear Scheduled in Primary Care or OBGYN    []  External Records Requested & Care Team Updated if Applicable       []  External Records Uploaded, Care Team Updated, & History Updated if Applicable    []  Patient Declined Scheduling Pap Smear    []  Patient Will Schedule with External Provider & Care Team Updated if Applicable                  Colorectal Cancer Screening []  Colonoscopy Case Request / Referral / Home Test Order Placed    []  External Records Requested & Care Team Updated if Applicable    []  External Records Uploaded, Care Team Updated, & History Updated if Applicable    []  Patient Declined Completing Colon Cancer Screening    []  Patient Will Schedule with External Provider & Care Team Updated if Applicable    []  Fit Kit Mailed (add the SmartPhrase under additional notes)    []  Reminded Patient to Complete Home Test                Diabetic Eye Exam []  Eye Exam Screening Order Placed    []  Eye Camera Scheduled or Optometry/Ophthalmology  Referral Placed    []  External Records Requested & Care Team Updated if Applicable    []  External Records Uploaded, Care Team Updated, & History Updated if Applicable    []  Patient Declined Scheduling Eye Exam    []  Patient Will Schedule with External Provider & Care Team Updated if Applicable             Blood Pressure Control []  Primary Care Follow Up Visit Scheduled     []  Remote Blood Pressure Reading Captured    []  Patient Declined Remote Reading or Scheduling Appt - Escalated to PCP    []  Patient Will Call Back or Send Portal Message with Reading                 HbA1c & Other Labs []  Overdue Lab(s) Ordered    []  Overdue Lab(s) Scheduled    []  External Records Uploaded & Care Team Updated if Applicable    []  Primary Care Follow Up Visit Scheduled     []  Reminded Patient to Complete A1c Home Test    []  Patient Declined Scheduling Labs or Will Call Back to Schedule    []  Patient Will Schedule with External Provider / Order Routed, & Care Team Updated if Applicable           Primary Care Appointment []  Primary Care Appt Scheduled    []  Patient Declined Scheduling or Will Call Back to Schedule    []  Pt Established with External Provider, Updated Care Team, & Informed Pt to Notify Payor if Applicable           Medication Adherence /    Statin Use []  Primary Care Appointment Scheduled    []  Patient Reminded to  Prescription    []  Patient Declined, Provider Notified if Needed    []  Sent Provider Message to Review to Evaluate Pt for Statin, Add Exclusion Dx Codes, Document   Exclusion in Problem List, Change Statin Intensity Level to Moderate or High Intensity if Applicable                Osteoporosis Screening []  Dexa Order Placed    []  Dexa Appointment Scheduled    []  External Records Requested & Care Team Updated    []  External Records Uploaded, Care Team Updated, & History Updated if Applicable    []  Patient Declined Scheduling Dexa or Will Call Back to Schedule    []  Patient Will  Schedule with External Provider / Order Routed & Care Team Updated if Applicable       Additional Notes:    Chart review completed as part of October MA Gap List report.

## 2023-11-27 DIAGNOSIS — G89.4 CHRONIC PAIN SYNDROME: ICD-10-CM

## 2023-11-27 RX ORDER — OXYCODONE AND ACETAMINOPHEN 5; 325 MG/1; MG/1
1 TABLET ORAL EVERY 8 HOURS PRN
Qty: 90 TABLET | Refills: 0 | Status: SHIPPED | OUTPATIENT
Start: 2023-11-27 | End: 2023-12-27 | Stop reason: SDUPTHER

## 2023-11-27 NOTE — TELEPHONE ENCOUNTER
----- Message from Mirian Cabrera sent at 11/27/2023  8:58 AM CST -----  Contact: patient  Type:  Patient Call          Who Called: patient         Does the patient know what this is regarding?: Requesting a call back to have a refill on Rx oxyCODONE-acetaminophen (PERCOCET) 5-325 mg per ;  Also pt would like to know about having a appt in Dec prior to her appeal for her injection ;  Pt said it's very urgent she receive a call back she have  a hearing on 11/30 at 9 am ; please advise           Would the patient rather a call back or a response via [facility]? Call           Best Call Back Number:[phone] or [phone] (home)              Additional Information:  [facility]  [address] [place], [place] [address] [place] [address]  Phone: [phone] Fax: [phone]

## 2023-11-27 NOTE — TELEPHONE ENCOUNTER
Patient requesting refill on oxyCODONE-acetaminophen (PERCOCET) 5-325 mg   Last office visit 11/01/23   shows last refill on 10/27/23  Patient does have a pain contract on file with [facility]  Patient last UDS 08/16/22 was consistent with current therapy      CODEINE  Not Detected Not Detected   MORPHINE  Not Detected Not Detected   6-ACETYLMORPHINE  Not Detected Not Detected   OXYCODONE  Present Present   NOROYXCODONE  Present Present   OXYMORPHONE  Present Not Detected   NOROXYMORPHONE  Present Not Detected   HYDROCODONE  Not Detected Not Detected   NORHYDROCODONE  Not Detected Not Detected   HYDROMORPHONE  Not Detected Not Detected   BUPRENORPHINE  Not Detected Not Detected   NORUBPRENORPHINE  Not Detected Not Detected   FENTANYL  Not Detected Not Detected   NORFENTANYL  Not Detected Not Detected   MEPERIDINE METABOLITE  Not Detected Not Detected   TAPENTADOL  Not Detected Not Detected   TAPENTADOL-O-SULF  Not Detected Not Detected   METHADONE  Not Detected Not Detected   TRAMADOL  Not Detected Not Detected   AMPHETAMINE  Not Detected Not Detected   METHAMPHETAMINE  Not Detected Not Detected   MDMA- ECSTASY  Not Detected Not Detected   MDA  Not Detected Not Detected   MDEA- Genoveva  Not Detected Not Detected   METHYLPHENIDATE  Not Detected Not Detected   PHENTERMINE  Not Detected Not Detected   BENZOYLECGONINE  Not Detected Not Detected   ALPRAZOLAM  Not Detected Not Detected   ALPHA-OH-ALPRAZOLAM  Not Detected Not Detected   CLONAZEPAM  Not Detected Not Detected   7-AMINOCLONAZEPAM  Not Detected Not Detected   DIAZEPAM  Not Detected Not Detected   NORDIAZEPAM  Not Detected Not Detected   OXAZEPAM  Not Detected Not Detected   TEMAZEPAM  Not Detected Not Detected   Lorazepam  Not Detected Not Detected   MIDAZOLAM  Not Detected Not Detected   ZOLPIDEM  Not Detected Not Detected   BARBITURATES  Not Detected Not Detected   Creatinine, Urine 20.0 - 400.0 mg/dL 205.6 102.0   ETHYL  GLUCURONIDE  Present Not Detected   MARIJUANA METABOLITE  Not Detected Not Detected   PCP  Not Detected Not Detected   CARISOPRODOL  Not Detected Not Detected CM   Comment: The carisoprodol immunoassay has cross-reactivity to  carisoprodol and meprobamate.   Naloxone  Not Detected    Gabapentin  Present    Pregabalin  Not Detected    Alpha-OH-Midazolam  Not Detected    Zolpidem Metabolite  Not Detected    PAIN MANAGEMENT DRUG PANEL  See Below See Below CM

## 2023-11-27 NOTE — TELEPHONE ENCOUNTER
Pt refill request has been pended to  and is currently awaiting an approval.    ----- Message from Mirian Cabrera sent at 11/27/2023  8:58 AM CST -----  Contact: patient  Type:  Patient Call          Who Called: patient         Does the patient know what this is regarding?: Requesting a call back to have a refill on Rx oxyCODONE-acetaminophen (PERCOCET) 5-325 mg per ;  Also pt would like to know about having a appt in Dec prior to her appeal for her injection ;  Pt said it's very urgent she receive a call back she have  a hearing on 11/30 at 9 am ; please advise           Would the patient rather a call back or a response via [facility]? Call           Best Call Back Number:[phone] or [phone] ([place])              Additional Information:  [facility]  [address] [place], [place] [id number]  Phone: [phone] Fax: [phone]

## 2023-11-29 RX ORDER — FERROUS GLUCONATE 324(38)MG
324 TABLET ORAL
Qty: 90 TABLET | Refills: 3 | Status: SHIPPED | OUTPATIENT
Start: 2023-11-29

## 2023-11-29 NOTE — TELEPHONE ENCOUNTER
Refill Routing Note   Medication(s) are not appropriate for processing by [facility] for the following reason(s):        Outside of protocol    [facility] action(s):  Route               Appointments  past 12m or future 3m with PCP    Date Provider   Last Visit   5/29/2023 Lei Garcia MD   Next Visit   Visit date not found Lei Garcia MD   ED visits in past 90 days: 0        Note composed:2:28 PM 11/29/2023

## 2023-12-07 ENCOUNTER — OFFICE VISIT (OUTPATIENT)
Dept: ORTHOPEDICS | Facility: CLINIC | Age: 73
End: 2023-12-07
Payer: MEDICARE

## 2023-12-07 VITALS — WEIGHT: 264.44 LBS | BODY MASS INDEX: 45.15 KG/M2 | HEIGHT: 64 IN

## 2023-12-07 DIAGNOSIS — M17.12 PRIMARY OSTEOARTHRITIS OF LEFT KNEE: Primary | ICD-10-CM

## 2023-12-07 PROCEDURE — 1159F MED LIST DOCD IN RCRD: CPT | Mod: HCNC,CPTII,S$GLB, | Performed by: ORTHOPAEDIC SURGERY

## 2023-12-07 PROCEDURE — 4010F PR ACE/ARB THEARPY RXD/TAKEN: ICD-10-PCS | Mod: HCNC,CPTII,S$GLB, | Performed by: ORTHOPAEDIC SURGERY

## 2023-12-07 PROCEDURE — 1101F PR PT FALLS ASSESS DOC 0-1 FALLS W/OUT INJ PAST YR: ICD-10-PCS | Mod: HCNC,CPTII,S$GLB, | Performed by: ORTHOPAEDIC SURGERY

## 2023-12-07 PROCEDURE — 1159F PR MEDICATION LIST DOCUMENTED IN MEDICAL RECORD: ICD-10-PCS | Mod: HCNC,CPTII,S$GLB, | Performed by: ORTHOPAEDIC SURGERY

## 2023-12-07 PROCEDURE — 1101F PT FALLS ASSESS-DOCD LE1/YR: CPT | Mod: HCNC,CPTII,S$GLB, | Performed by: ORTHOPAEDIC SURGERY

## 2023-12-07 PROCEDURE — 4010F ACE/ARB THERAPY RXD/TAKEN: CPT | Mod: HCNC,CPTII,S$GLB, | Performed by: ORTHOPAEDIC SURGERY

## 2023-12-07 PROCEDURE — 1125F PR PAIN SEVERITY QUANTIFIED, PAIN PRESENT: ICD-10-PCS | Mod: HCNC,CPTII,S$GLB, | Performed by: ORTHOPAEDIC SURGERY

## 2023-12-07 PROCEDURE — 3288F PR FALLS RISK ASSESSMENT DOCUMENTED: ICD-10-PCS | Mod: HCNC,CPTII,S$GLB, | Performed by: ORTHOPAEDIC SURGERY

## 2023-12-07 PROCEDURE — 1160F RVW MEDS BY RX/DR IN RCRD: CPT | Mod: HCNC,CPTII,S$GLB, | Performed by: ORTHOPAEDIC SURGERY

## 2023-12-07 PROCEDURE — 99024 PR POST-OP FOLLOW-UP VISIT: ICD-10-PCS | Mod: HCNC,S$GLB,, | Performed by: ORTHOPAEDIC SURGERY

## 2023-12-07 PROCEDURE — 1160F PR REVIEW ALL MEDS BY PRESCRIBER/CLIN PHARMACIST DOCUMENTED: ICD-10-PCS | Mod: HCNC,CPTII,S$GLB, | Performed by: ORTHOPAEDIC SURGERY

## 2023-12-07 PROCEDURE — 99024 POSTOP FOLLOW-UP VISIT: CPT | Mod: HCNC,S$GLB,, | Performed by: ORTHOPAEDIC SURGERY

## 2023-12-07 PROCEDURE — 1125F AMNT PAIN NOTED PAIN PRSNT: CPT | Mod: HCNC,CPTII,S$GLB, | Performed by: ORTHOPAEDIC SURGERY

## 2023-12-07 PROCEDURE — 3288F FALL RISK ASSESSMENT DOCD: CPT | Mod: HCNC,CPTII,S$GLB, | Performed by: ORTHOPAEDIC SURGERY

## 2023-12-07 PROCEDURE — 99999 PR PBB SHADOW E&M-EST. PATIENT-LVL III: ICD-10-PCS | Mod: PBBFAC,HCNC,, | Performed by: ORTHOPAEDIC SURGERY

## 2023-12-07 PROCEDURE — 99999 PR PBB SHADOW E&M-EST. PATIENT-LVL III: CPT | Mod: PBBFAC,HCNC,, | Performed by: ORTHOPAEDIC SURGERY

## 2023-12-08 ENCOUNTER — PATIENT MESSAGE (OUTPATIENT)
Dept: ENDOSCOPY | Facility: HOSPITAL | Age: 73
End: 2023-12-08
Payer: MEDICARE

## 2023-12-08 ENCOUNTER — TELEPHONE (OUTPATIENT)
Dept: HEMATOLOGY/ONCOLOGY | Facility: CLINIC | Age: 73
End: 2023-12-08
Payer: MEDICARE

## 2023-12-08 ENCOUNTER — TELEPHONE (OUTPATIENT)
Dept: ENDOSCOPY | Facility: HOSPITAL | Age: 73
End: 2023-12-08
Payer: MEDICARE

## 2023-12-08 NOTE — TELEPHONE ENCOUNTER
----- Message from Karli Miguel RN sent at 12/8/2023  9:25 AM CST -----  Dear Provider,    Patient has a scheduled procedure Upper Endoscopy Ultrasound (EUS) on 2/24/24 and is currently taking a blood thinner prescribed by your office. In order to ensure patient safety, we would like to confirm that the patient can place their blood thinner medication on hold for the procedure. Can he/she discontinue Eliquis (apixaban) for a minimum of 2 days prior to the procedure?     Thank you for your prompt reply.    [facility] Endoscopy Scheduling

## 2023-12-08 NOTE — TELEPHONE ENCOUNTER
Spoke to Pt to schedule procedure(s) Upper Endoscopy Ultrasound (EUS)       Physician to perform procedure(s) Dr. HENRY Carballo  Date of Procedure (s) 1/24/24  Arrival Time 8:00 AM  Time of Procedure(s) 9:00 AM   Location of Procedure(s) [address]  Type of Rx Prep sent to patient: N/A  Instructions provided to patient via [facility]    Patient was informed on the following information and verbalized understanding. Screening questionnaire reviewed with patient and complete. If procedure requires anesthesia, a responsible adult needs to be present to accompany the patient home, patient cannot drive after receiving anesthesia. Appointment details are tentative, especially check-in time. Patient will receive a prep-op call 7 days prior to confirm check-in time for procedure. If applicable the patient should contact their pharmacy to verify Rx for procedure prep is ready for pick-up. Patient was advised to call the scheduling department at [phone] if pharmacy states no Rx is available. Patient was advised to call the endoscopy scheduling department if any questions or concerns arise.      [facility] Scheduling Department

## 2023-12-11 ENCOUNTER — TELEPHONE (OUTPATIENT)
Dept: SMOKING CESSATION | Facility: CLINIC | Age: 73
End: 2023-12-11
Payer: MEDICARE

## 2023-12-11 NOTE — TELEPHONE ENCOUNTER
Called patient for scheduled appointment. Left voicemail with CTTS contact information for patient to return call and follow-up.

## 2023-12-12 ENCOUNTER — TELEPHONE (OUTPATIENT)
Dept: ENDOSCOPY | Facility: HOSPITAL | Age: 73
End: 2023-12-12
Payer: MEDICARE

## 2023-12-12 NOTE — PROGRESS NOTES
Subjective:      Patient ID: Ca Coats is a 72 y.o. female.    Chief Complaint:   Pain of the Left Knee    HPI  She returns again to discuss her left knee osteoarthritis pain.  From her previous office note, reviewed with the patient and confirmed:  Ca Coats is a 72 y.o. female here with a 6 years history of left knee pain. The patient is a  on disability. There was not a history of trauma.  The pain is severe The pain is located in the medial aspect of the knee. There is is not radiation.  There is not catching or locking.   The pain is described as dull. The patient has not had prior surgery. It is aggravated by sitting, standing, and walking.  It is alleviated by rest. There is not numbness or tingling of the lower extremity.  There is not back pain.  She  has tried multiple steroid injections and RFA. They have helped.  She does have difficulty getting in or out of a car, getting dressed, or going up or down stairs.  The patient does use an assistive device.       Objective:        Ortho/SPM Exam    There is is a mild varus deformity, which is passively correctable.  Active range of motion is 5-115 degrees.  Anterior crepitus with active extension.  Patellar mobility is limited.  Boggy synovitis without effusion.  Medial joint line tenderness predominates.  No instability to varus/valgus/Lachman's stressing.  No pain in the groin with flexion and internal rotation of the hip which is not limited.  Skin intact.  Distal neurovascular intact.  Flip test negative.        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  She understands that if she comes for her preop visit and her BMI is over 45 that she will be postponed.    The patient's pathophysiology was  explained in detail with reference to x-rays, models, other visual aids as appropriate.  Treatment options were discussed in detail.  Questions were invited and answered to the patient's satisfaction.      Dwight Hensley MD  Orthopedic Surgery

## 2023-12-12 NOTE — TELEPHONE ENCOUNTER
Cathy Pelaez MD Williams-West, Drunita, MA; Karli Miguel, RN  Caller: Unspecified (4 days ago, 10:49 AM)  Yes, ok to hold anticoagulation for the procedure. Thank you.          Previous Messages       ----- Message -----  From: Jj Montero MA  Sent: 12/8/2023  10:50 AM CST  To: Cathy Pelaez MD

## 2023-12-13 ENCOUNTER — CLINICAL SUPPORT (OUTPATIENT)
Dept: SMOKING CESSATION | Facility: CLINIC | Age: 73
End: 2023-12-13
Payer: COMMERCIAL

## 2023-12-13 DIAGNOSIS — F17.200 NICOTINE DEPENDENCE: Primary | ICD-10-CM

## 2023-12-13 PROCEDURE — 99407 PR TOBACCO USE CESSATION INTENSIVE >10 MINUTES: ICD-10-PCS | Mod: S$GLB,,,

## 2023-12-13 PROCEDURE — 99407 BEHAV CHNG SMOKING > 10 MIN: CPT | Mod: S$GLB,,,

## 2023-12-13 NOTE — PROGRESS NOTES
Spoke with patient today in regard to smoking cessation progress for 6-month telephone follow up.  Patient states that she is tobacco free. Commended patient on their quit.  Patient states she is doing well with her quit.  Patient states she was approved for surgery and is proud to be quit.  Informed patient of benefit period, future follow up, and contact information if any further help or support is needed. Will complete smart form for 6 month follow up on Quit attempt #4.

## 2023-12-18 ENCOUNTER — OFFICE VISIT (OUTPATIENT)
Dept: OBSTETRICS AND GYNECOLOGY | Facility: CLINIC | Age: 73
End: 2023-12-18
Payer: MEDICARE

## 2023-12-18 VITALS
HEIGHT: 64 IN | SYSTOLIC BLOOD PRESSURE: 118 MMHG | WEIGHT: 268.94 LBS | BODY MASS INDEX: 45.91 KG/M2 | DIASTOLIC BLOOD PRESSURE: 68 MMHG

## 2023-12-18 DIAGNOSIS — Z12.4 SCREENING FOR CERVICAL CANCER: ICD-10-CM

## 2023-12-18 DIAGNOSIS — N95.0 POSTMENOPAUSAL BLEEDING: Primary | ICD-10-CM

## 2023-12-18 DIAGNOSIS — N93.9 VAGINAL BLEEDING: ICD-10-CM

## 2023-12-18 PROCEDURE — 3078F DIAST BP <80 MM HG: CPT | Mod: HCNC,CPTII,S$GLB, | Performed by: OBSTETRICS & GYNECOLOGY

## 2023-12-18 PROCEDURE — 1126F PR PAIN SEVERITY QUANTIFIED, NO PAIN PRESENT: ICD-10-PCS | Mod: HCNC,CPTII,S$GLB, | Performed by: OBSTETRICS & GYNECOLOGY

## 2023-12-18 PROCEDURE — 3288F FALL RISK ASSESSMENT DOCD: CPT | Mod: HCNC,CPTII,S$GLB, | Performed by: OBSTETRICS & GYNECOLOGY

## 2023-12-18 PROCEDURE — 4010F ACE/ARB THERAPY RXD/TAKEN: CPT | Mod: HCNC,CPTII,S$GLB, | Performed by: OBSTETRICS & GYNECOLOGY

## 2023-12-18 PROCEDURE — 3288F PR FALLS RISK ASSESSMENT DOCUMENTED: ICD-10-PCS | Mod: HCNC,CPTII,S$GLB, | Performed by: OBSTETRICS & GYNECOLOGY

## 2023-12-18 PROCEDURE — 1160F PR REVIEW ALL MEDS BY PRESCRIBER/CLIN PHARMACIST DOCUMENTED: ICD-10-PCS | Mod: HCNC,CPTII,S$GLB, | Performed by: OBSTETRICS & GYNECOLOGY

## 2023-12-18 PROCEDURE — 99203 OFFICE O/P NEW LOW 30 MIN: CPT | Mod: 25,HCNC,S$GLB, | Performed by: OBSTETRICS & GYNECOLOGY

## 2023-12-18 PROCEDURE — 87624 HPV HI-RISK TYP POOLED RSLT: CPT | Mod: HCNC | Performed by: OBSTETRICS & GYNECOLOGY

## 2023-12-18 PROCEDURE — 3008F PR BODY MASS INDEX (BMI) DOCUMENTED: ICD-10-PCS | Mod: HCNC,CPTII,S$GLB, | Performed by: OBSTETRICS & GYNECOLOGY

## 2023-12-18 PROCEDURE — 1101F PR PT FALLS ASSESS DOC 0-1 FALLS W/OUT INJ PAST YR: ICD-10-PCS | Mod: HCNC,CPTII,S$GLB, | Performed by: OBSTETRICS & GYNECOLOGY

## 2023-12-18 PROCEDURE — 58100 BIOPSY OF UTERUS LINING: CPT | Mod: HCNC,S$GLB,, | Performed by: OBSTETRICS & GYNECOLOGY

## 2023-12-18 PROCEDURE — 58100 ENDOMETRIAL BIOPSY: ICD-10-PCS | Mod: HCNC,S$GLB,, | Performed by: OBSTETRICS & GYNECOLOGY

## 2023-12-18 PROCEDURE — 88305 TISSUE EXAM BY PATHOLOGIST: CPT | Mod: HCNC | Performed by: PATHOLOGY

## 2023-12-18 PROCEDURE — 99999 PR PBB SHADOW E&M-EST. PATIENT-LVL IV: ICD-10-PCS | Mod: PBBFAC,HCNC,, | Performed by: OBSTETRICS & GYNECOLOGY

## 2023-12-18 PROCEDURE — 1159F PR MEDICATION LIST DOCUMENTED IN MEDICAL RECORD: ICD-10-PCS | Mod: HCNC,CPTII,S$GLB, | Performed by: OBSTETRICS & GYNECOLOGY

## 2023-12-18 PROCEDURE — 3074F SYST BP LT 130 MM HG: CPT | Mod: HCNC,CPTII,S$GLB, | Performed by: OBSTETRICS & GYNECOLOGY

## 2023-12-18 PROCEDURE — 1159F MED LIST DOCD IN RCRD: CPT | Mod: HCNC,CPTII,S$GLB, | Performed by: OBSTETRICS & GYNECOLOGY

## 2023-12-18 PROCEDURE — 3078F PR MOST RECENT DIASTOLIC BLOOD PRESSURE < 80 MM HG: ICD-10-PCS | Mod: HCNC,CPTII,S$GLB, | Performed by: OBSTETRICS & GYNECOLOGY

## 2023-12-18 PROCEDURE — 88175 CYTOPATH C/V AUTO FLUID REDO: CPT | Mod: HCNC | Performed by: OBSTETRICS & GYNECOLOGY

## 2023-12-18 PROCEDURE — 1126F AMNT PAIN NOTED NONE PRSNT: CPT | Mod: HCNC,CPTII,S$GLB, | Performed by: OBSTETRICS & GYNECOLOGY

## 2023-12-18 PROCEDURE — 3008F BODY MASS INDEX DOCD: CPT | Mod: HCNC,CPTII,S$GLB, | Performed by: OBSTETRICS & GYNECOLOGY

## 2023-12-18 PROCEDURE — 1101F PT FALLS ASSESS-DOCD LE1/YR: CPT | Mod: HCNC,CPTII,S$GLB, | Performed by: OBSTETRICS & GYNECOLOGY

## 2023-12-18 PROCEDURE — 99999 PR PBB SHADOW E&M-EST. PATIENT-LVL IV: CPT | Mod: PBBFAC,HCNC,, | Performed by: OBSTETRICS & GYNECOLOGY

## 2023-12-18 PROCEDURE — 88305 TISSUE EXAM BY PATHOLOGIST: CPT | Mod: 26,HCNC,, | Performed by: PATHOLOGY

## 2023-12-18 PROCEDURE — 99203 PR OFFICE/OUTPT VISIT, NEW, LEVL III, 30-44 MIN: ICD-10-PCS | Mod: 25,HCNC,S$GLB, | Performed by: OBSTETRICS & GYNECOLOGY

## 2023-12-18 PROCEDURE — 1160F RVW MEDS BY RX/DR IN RCRD: CPT | Mod: HCNC,CPTII,S$GLB, | Performed by: OBSTETRICS & GYNECOLOGY

## 2023-12-18 PROCEDURE — 3074F PR MOST RECENT SYSTOLIC BLOOD PRESSURE < 130 MM HG: ICD-10-PCS | Mod: HCNC,CPTII,S$GLB, | Performed by: OBSTETRICS & GYNECOLOGY

## 2023-12-18 PROCEDURE — 88305 TISSUE EXAM BY PATHOLOGIST: ICD-10-PCS | Mod: 26,HCNC,, | Performed by: PATHOLOGY

## 2023-12-18 PROCEDURE — 4010F PR ACE/ARB THEARPY RXD/TAKEN: ICD-10-PCS | Mod: HCNC,CPTII,S$GLB, | Performed by: OBSTETRICS & GYNECOLOGY

## 2023-12-18 RX ORDER — ALBUTEROL SULFATE 90 UG/1
AEROSOL, METERED RESPIRATORY (INHALATION)
COMMUNITY
End: 2024-01-04 | Stop reason: CLARIF

## 2023-12-18 RX ORDER — BUPROPION HYDROCHLORIDE 100 MG/1
TABLET, EXTENDED RELEASE ORAL
COMMUNITY
End: 2024-01-04 | Stop reason: CLARIF

## 2023-12-18 RX ORDER — AMITRIPTYLINE HYDROCHLORIDE 25 MG/1
1 TABLET, FILM COATED ORAL DAILY
COMMUNITY
End: 2024-01-04 | Stop reason: CLARIF

## 2023-12-18 NOTE — PROGRESS NOTES
Subjective:      Patient ID: Ca Coats is a 72 y.o. female.    Chief Complaint:   bleeding      History of Present Illness  HPI  71 y/o  with a history of gastric adenocarcinoma presents for evaluation of postmenopausal bleeding. She reports intermittent vaginal bleeding for the past 1-2 months. Reports bleeding is bright red. She has not sexually active.  Reports no history of abnormal pap smears, last pap >20 years ago. Menopause >20 years ago, no hormone therapy.    GYN & OB History  No LMP recorded. Patient is postmenopausal.   Date of Last Pap: 2023    OB History    Para Term  AB Living   2 2 2         SAB IAB Ectopic Multiple Live Births                  # Outcome Date GA Lbr Casimiro/2nd Weight Sex Delivery Anes PTL Lv   2 Term            1 Term                Review of Systems  Review of Systems   Constitutional:  Negative for chills, diaphoresis, fatigue and fever.   Respiratory:  Negative for cough and shortness of breath.    Cardiovascular:  Negative for chest pain and palpitations.   Gastrointestinal:  Negative for abdominal pain, constipation, diarrhea, nausea and vomiting.   Genitourinary:  Positive for vaginal bleeding. Negative for dyspareunia, pelvic pain, vaginal discharge and vaginal pain.   Neurological:  Negative for headaches.   Psychiatric/Behavioral:  Negative for depression.           Objective:     Physical Exam:   Constitutional: She is oriented to person, place, and time. She appears well-developed and well-nourished. No distress.    HENT:   Head: Normocephalic and atraumatic.    Eyes: EOM are normal.      Pulmonary/Chest: Effort normal.          Genitourinary:    Genitourinary Comments: Atrophic external female genitalia; Urethral prolpase; vagina atrophic, normal; cervix normal, no masses; uterus small mobile nontender; no adnexal masses palpated; rectal deferred               Musculoskeletal: Normal range of motion and moves all extremeties.        Neurological: She is alert and oriented to person, place, and time.    Skin: Skin is warm.    Psychiatric: She has a normal mood and affect. Her behavior is normal. Judgment and thought content normal.         Assessment:     1. Postmenopausal bleeding    2. Vaginal bleeding    3. Screening for cervical cancer           Plan:     Ca was seen today for  bleeding.    Diagnoses and all orders for this visit:    Postmenopausal bleeding  Pap/HPV and EMBx done today. Scant tissue on EMBx. Pelvic US ordered.   -     US Pelvis Comp with Transvag NON-OB (xpd; Future  -     Specimen to Pathology, Ob/Gyn    Vaginal bleeding  -     Ambulatory referral/consult to Gynecology    Screening for cervical cancer  -     Liquid-Based Pap Smear, Screening  -     HPV High Risk Genotypes, PCR        Orders Placed This Encounter   Procedures    HPV High Risk Genotypes, PCR    US Pelvis Comp with Transvag NON-OB (xpd       Follow up for pelvic US.

## 2023-12-18 NOTE — PROCEDURES
Endometrial biopsy    Date/Time: 12/18/2023 9:30 AM    Performed by: Pina Pickens MD  Authorized by: Pina Pickens MD    Consent:     Consent given by:  Patient    Patient questions answered: yes      Patient agrees, verbalizes understanding, and wants to proceed: yes      Educational handouts given: yes      Instructions and paperwork completed: yes    Indication:     Indications: Post-menopausal bleeding      Chronicity of post-menopausal bleeding:  New    Progression of post-menopausal bleeding:  Waxing and waning  Pre-procedure:     Pre-procedure timeout performed: yes    Procedure:     Procedure: endometrial biopsy with Pipelle      Cervix cleaned and prepped: yes      A paracervical block was performed: no      An intracervical block was performed: no      The cervix was dilated: no      Uterus sounded: yes      Uterus sound depth (cm):  7    Specimen collected: specimen collected and sent to pathology    Comments:     Procedure comments:  Scant tissue after 3 passes.

## 2023-12-20 LAB
HPV HR 12 DNA SPEC QL NAA+PROBE: POSITIVE
HPV16 AG SPEC QL: NEGATIVE
HPV18 DNA SPEC QL NAA+PROBE: NEGATIVE

## 2023-12-21 ENCOUNTER — PATIENT MESSAGE (OUTPATIENT)
Dept: OBSTETRICS AND GYNECOLOGY | Facility: CLINIC | Age: 73
End: 2023-12-21
Payer: MEDICARE

## 2023-12-21 LAB
FINAL PATHOLOGIC DIAGNOSIS: NORMAL
GROSS: NORMAL
Lab: NORMAL

## 2023-12-27 DIAGNOSIS — G89.4 CHRONIC PAIN SYNDROME: ICD-10-CM

## 2023-12-27 RX ORDER — OXYCODONE AND ACETAMINOPHEN 5; 325 MG/1; MG/1
1 TABLET ORAL EVERY 8 HOURS PRN
Qty: 90 TABLET | Refills: 0 | Status: SHIPPED | OUTPATIENT
Start: 2023-12-27 | End: 2024-01-26 | Stop reason: SDUPTHER

## 2023-12-27 NOTE — TELEPHONE ENCOUNTER
----- Message from Rosita Gonzalez sent at 12/27/2023  9:12 AM CST -----  Regarding: med refill  Can the clinic reply in [facility]:       Please refill the medication(s) listed below.       Please call the patient when the prescription(s) is ready for  at this phone number: [phone]           Medication #1 oxyCODONE-acetaminophen (PERCOCET) 5-325 mg per tablet     Medication #2     Preferred Pharmacy:   [facility] [address] [place] [address]  Phone: [phone] Fax: [phone]

## 2023-12-27 NOTE — TELEPHONE ENCOUNTER
Patient requesting refill on oxyCODONE-acetaminophen (PERCOCET) 5-325 mg   Last office visit 11/01/23   shows last refill on 11/27/23  Patient does have a pain contract on file with [facility]  Patient last UDS 08/16/22 was consistent with current therapy        CODEINE   Not Detected Not Detected   MORPHINE   Not Detected Not Detected   6-ACETYLMORPHINE   Not Detected Not Detected   OXYCODONE   Present Present   NOROYXCODONE   Present Present   OXYMORPHONE   Present Not Detected   NOROXYMORPHONE   Present Not Detected   HYDROCODONE   Not Detected Not Detected   NORHYDROCODONE   Not Detected Not Detected   HYDROMORPHONE   Not Detected Not Detected   BUPRENORPHINE   Not Detected Not Detected   NORUBPRENORPHINE   Not Detected Not Detected   FENTANYL   Not Detected Not Detected   NORFENTANYL   Not Detected Not Detected   MEPERIDINE METABOLITE   Not Detected Not Detected   TAPENTADOL   Not Detected Not Detected   TAPENTADOL-O-SULF   Not Detected Not Detected   METHADONE   Not Detected Not Detected   TRAMADOL   Not Detected Not Detected   AMPHETAMINE   Not Detected Not Detected   METHAMPHETAMINE   Not Detected Not Detected   MDMA- ECSTASY   Not Detected Not Detected   MDA   Not Detected Not Detected   MDEA- Genoveva   Not Detected Not Detected   METHYLPHENIDATE   Not Detected Not Detected   PHENTERMINE   Not Detected Not Detected   BENZOYLECGONINE   Not Detected Not Detected   ALPRAZOLAM   Not Detected Not Detected   ALPHA-OH-ALPRAZOLAM   Not Detected Not Detected   CLONAZEPAM   Not Detected Not Detected   7-AMINOCLONAZEPAM   Not Detected Not Detected   DIAZEPAM   Not Detected Not Detected   NORDIAZEPAM   Not Detected Not Detected   OXAZEPAM   Not Detected Not Detected   TEMAZEPAM   Not Detected Not Detected   Lorazepam   Not Detected Not Detected   MIDAZOLAM   Not Detected Not Detected   ZOLPIDEM   Not Detected Not Detected   BARBITURATES   Not Detected Not Detected   Creatinine, Urine 20.0  - 400.0 mg/dL 205.6 102.0   ETHYL GLUCURONIDE   Present Not Detected   MARIJUANA METABOLITE   Not Detected Not Detected   PCP   Not Detected Not Detected   CARISOPRODOL   Not Detected Not Detected CM   Comment: The carisoprodol immunoassay has cross-reactivity to  carisoprodol and meprobamate.   Naloxone   Not Detected     Gabapentin   Present     Pregabalin   Not Detected     Alpha-OH-Midazolam   Not Detected     Zolpidem Metabolite   Not Detected     PAIN MANAGEMENT DRUG PANEL   See Below See Below CM

## 2023-12-28 ENCOUNTER — TELEPHONE (OUTPATIENT)
Dept: OBSTETRICS AND GYNECOLOGY | Facility: CLINIC | Age: 73
End: 2023-12-28
Payer: MEDICARE

## 2023-12-28 ENCOUNTER — HOSPITAL ENCOUNTER (OUTPATIENT)
Dept: RADIOLOGY | Facility: OTHER | Age: 73
Discharge: HOME | End: 2023-12-28
Attending: OBSTETRICS & GYNECOLOGY
Payer: MEDICARE

## 2023-12-28 DIAGNOSIS — N95.0 POSTMENOPAUSAL BLEEDING: Primary | ICD-10-CM

## 2023-12-28 DIAGNOSIS — N95.0 POSTMENOPAUSAL BLEEDING: ICD-10-CM

## 2023-12-28 PROCEDURE — 76856 US EXAM PELVIC COMPLETE: CPT | Mod: 26,HCNC,, | Performed by: RADIOLOGY

## 2023-12-28 PROCEDURE — 76856 US EXAM PELVIC COMPLETE: CPT | Mod: TC,HCNC

## 2023-12-28 PROCEDURE — 76856 US PELVIS COMPLETE NON OB: ICD-10-PCS | Mod: 26,HCNC,, | Performed by: RADIOLOGY

## 2023-12-28 NOTE — TELEPHONE ENCOUNTER
Called patient to review pelvic US. US noted thickened EMS to 9 mm. EMBx negative but given PMB recommended further workup with hysteroscopy/D&C. Case request placed. Will request medical clearance.

## 2023-12-29 ENCOUNTER — ANESTHESIA EVENT (OUTPATIENT)
Dept: SURGERY | Facility: OTHER | Age: 73
End: 2023-12-29
Payer: MEDICARE

## 2024-01-02 ENCOUNTER — TELEPHONE (OUTPATIENT)
Dept: INTERNAL MEDICINE | Facility: CLINIC | Age: 74
End: 2024-01-02
Payer: MEDICARE

## 2024-01-02 NOTE — TELEPHONE ENCOUNTER
----- Message from Reno Raphael MA sent at 12/28/2023  3:52 PM CST -----    ----- Message -----  From: Pina Pickens MD  Sent: 12/28/2023   3:20 PM CST  To: Radha Odom Staff    Hi,     I am attempting to schedule this patient for a D&C procedure under anesthesia at [facility] for possible endometrial cancer. I am hoping to have the procedure done soon and have requested Ramiro 10. I would like to get medical clearance for this patient if possible. Is this something Dr. Garcia could do or is there a medical clearance clinic that would have sooner availability?    Thank you,   Dr. Pickens

## 2024-01-03 LAB
FINAL PATHOLOGIC DIAGNOSIS: NORMAL
Lab: NORMAL

## 2024-01-04 ENCOUNTER — HOSPITAL ENCOUNTER (OUTPATIENT)
Dept: PREADMISSION TESTING | Facility: OTHER | Age: 74
Discharge: HOME | End: 2024-01-04
Attending: OBSTETRICS & GYNECOLOGY
Payer: MEDICARE

## 2024-01-04 VITALS
WEIGHT: 268 LBS | HEIGHT: 64 IN | RESPIRATION RATE: 19 BRPM | TEMPERATURE: 98 F | DIASTOLIC BLOOD PRESSURE: 59 MMHG | SYSTOLIC BLOOD PRESSURE: 128 MMHG | OXYGEN SATURATION: 96 % | HEART RATE: 70 BPM | BODY MASS INDEX: 45.75 KG/M2

## 2024-01-04 DIAGNOSIS — Z01.818 PREOP TESTING: Primary | ICD-10-CM

## 2024-01-04 LAB
ANION GAP SERPL CALC-SCNC: 4 MMOL/L (ref 8–16)
BASOPHILS # BLD AUTO: 0.02 K/UL (ref 0–0.2)
BASOPHILS NFR BLD: 0.3 % (ref 0–1.9)
BUN SERPL-MCNC: 14 MG/DL (ref 8–23)
CALCIUM SERPL-MCNC: 8.9 MG/DL (ref 8.7–10.5)
CHLORIDE SERPL-SCNC: 107 MMOL/L (ref 95–110)
CO2 SERPL-SCNC: 27 MMOL/L (ref 23–29)
CREAT SERPL-MCNC: 0.8 MG/DL (ref 0.5–1.4)
DIFFERENTIAL METHOD BLD: ABNORMAL
EOSINOPHIL # BLD AUTO: 0.1 K/UL (ref 0–0.5)
EOSINOPHIL NFR BLD: 0.8 % (ref 0–8)
ERYTHROCYTE [DISTWIDTH] IN BLOOD BY AUTOMATED COUNT: 12.7 % (ref 11.5–14.5)
EST. GFR  (NO RACE VARIABLE): >60 ML/MIN/1.73 M^2
GLUCOSE SERPL-MCNC: 91 MG/DL (ref 70–110)
HCT VFR BLD AUTO: 36.8 % (ref 37–48.5)
HGB BLD-MCNC: 11.5 G/DL (ref 12–16)
IMM GRANULOCYTES # BLD AUTO: 0.03 K/UL (ref 0–0.04)
IMM GRANULOCYTES NFR BLD AUTO: 0.4 % (ref 0–0.5)
LYMPHOCYTES # BLD AUTO: 1.8 K/UL (ref 1–4.8)
LYMPHOCYTES NFR BLD: 25.5 % (ref 18–48)
MCH RBC QN AUTO: 32 PG (ref 27–31)
MCHC RBC AUTO-ENTMCNC: 31.3 G/DL (ref 32–36)
MCV RBC AUTO: 103 FL (ref 82–98)
MONOCYTES # BLD AUTO: 1 K/UL (ref 0.3–1)
MONOCYTES NFR BLD: 14.1 % (ref 4–15)
NEUTROPHILS # BLD AUTO: 4.3 K/UL (ref 1.8–7.7)
NEUTROPHILS NFR BLD: 58.9 % (ref 38–73)
NRBC BLD-RTO: 0 /100 WBC
PLATELET # BLD AUTO: 202 K/UL (ref 150–450)
PMV BLD AUTO: 10.1 FL (ref 9.2–12.9)
POTASSIUM SERPL-SCNC: 4.6 MMOL/L (ref 3.5–5.1)
RBC # BLD AUTO: 3.59 M/UL (ref 4–5.4)
SODIUM SERPL-SCNC: 138 MMOL/L (ref 136–145)
WBC # BLD AUTO: 7.22 K/UL (ref 3.9–12.7)

## 2024-01-04 PROCEDURE — 85025 COMPLETE CBC W/AUTO DIFF WBC: CPT | Mod: HCNC | Performed by: ANESTHESIOLOGY

## 2024-01-04 PROCEDURE — 80048 BASIC METABOLIC PNL TOTAL CA: CPT | Mod: HCNC | Performed by: ANESTHESIOLOGY

## 2024-01-04 PROCEDURE — 36415 COLL VENOUS BLD VENIPUNCTURE: CPT | Mod: HCNC | Performed by: ANESTHESIOLOGY

## 2024-01-04 RX ORDER — PREGABALIN 75 MG/1
75 CAPSULE ORAL ONCE
Status: CANCELLED | OUTPATIENT
Start: 2024-01-04 | End: 2024-01-04

## 2024-01-04 RX ORDER — LIDOCAINE HYDROCHLORIDE 10 MG/ML
0.5 INJECTION, SOLUTION EPIDURAL; INFILTRATION; INTRACAUDAL; PERINEURAL ONCE
Status: CANCELLED | OUTPATIENT
Start: 2024-01-04 | End: 2024-01-04

## 2024-01-04 RX ORDER — SODIUM CHLORIDE, SODIUM LACTATE, POTASSIUM CHLORIDE, CALCIUM CHLORIDE 600; 310; 30; 20 MG/100ML; MG/100ML; MG/100ML; MG/100ML
INJECTION, SOLUTION INTRAVENOUS CONTINUOUS
Status: CANCELLED | OUTPATIENT
Start: 2024-01-04

## 2024-01-04 RX ORDER — ACETAMINOPHEN 500 MG
1000 TABLET ORAL
Status: CANCELLED | OUTPATIENT
Start: 2024-01-04 | End: 2024-01-04

## 2024-01-04 NOTE — DISCHARGE INSTRUCTIONS
Information to Prepare you for your Surgery    PRE-ADMIT TESTING -  [phone]    [address] [facility]          Your surgery has been scheduled at [facility]. We are pleased to have the opportunity to serve you. For Further Information please call [phone].    On the day of surgery please report to the Information Desk on the 1st floor.    CONTACT YOUR PHYSICIAN'S OFFICE THE DAY PRIOR TO YOUR SURGERY TO OBTAIN YOUR ARRIVAL TIME.     The evening before surgery do not eat anything after 9 p.m. ( this includes hard candy, chewing gum and mints).  You may only have GATORADE, POWERADE AND WATER  from 9 p.m. until you leave your home.   DO NOT DRINK ANY LIQUIDS ON THE WAY TO THE HOSPITAL.      Why does your anesthesiologist allow you to drink Gatorade/Powerade before surgery?  Gatorade/Powerade helps to increase your comfort before surgery and to decrease your nausea after surgery. The carbohydrates in Gatorade/Powerade help reduce your body's stress response to surgery.  If you are a diabetic-drink only water prior to surgery.    Outpatient Surgery- May allow 2 adult (18 and older) Support Persons (1 being the designated ) for all surgical/procedural patients. A breastfeeding mother will be allowed her infant and 2 adult Support Persons. No one under the age of 18 will be allowed in the building.      SPECIAL MEDICATION INSTRUCTIONS: TAKE medications checked off by the Anesthesiologist on your Medication List.    Angiogram Patients: Take medications as instructed by your physician, including aspirin.     Surgery Patients:    If you take ASPIRIN - Your PHYSICIAN/SURGEON will need to inform you IF/OR when you need to stop taking aspirin prior to your surgery.     The week prior to surgery do not ot take any medications containing IBUPROFEN or NSAIDS ( Advil, Motrin, Goodys, BC, Aleve, Naproxen etc) If you are not sure if you should take a medicine  please call your surgeon's office.  Ok to take Tylenol    Do Not Wear any make-up (especially eye make-up) to surgery. Please remove any false eyelashes or eyelash extensions. If you arrive the day of surgery with makeup/eyelashes on you will be required to remove prior to surgery. (There is a risk of corneal abrasions if eye makeup/eyelash extensions are not removed)      Leave all valuables at home.   Do Not wear any jewelry or watches, including any metal in body piercings. Jewelry must be removed prior to coming to the hospital.  There is a possibility that rings that are unable to be removed may be cut off if they are on the surgical extremity.    Please remove all hair extensions, wigs, clips and any other metal accessories/ ornaments from your hair.  These items may pose a flammable/fire risk in Surgery and must be removed.    Do not shave your surgical area at least 5 days prior to your surgery. The surgical prep will be performed at the hospital according to Infection Control regulations.    Contact Lens must be removed before surgery. Either do not wear the contact lens or bring a case and solution for storage.  Please bring a container for eyeglasses or dentures as required.  Bring any paperwork your physician has provided, such as consent forms,  history and physicals, doctor's orders, etc.   Bring comfortable clothes that are loose fitting to wear upon discharge. Take into consideration the type of surgery being performed.  Maintain your diet as advised per your physician the day prior to surgery.      Adequate rest the night before surgery is advised.   Park in the Parking lot behind the hospital or in the [facility] across the street from the parking lot. Parking is complimentary.  If you will be discharged the same day as your procedure, please arrange for a responsible adult to drive you home or to accompany you if traveling by taxi.   YOU WILL NOT BE PERMITTED TO DRIVE OR TO LEAVE THE  HOSPITAL ALONE AFTER SURGERY.   If you are being discharged the same day, it is strongly recommended that you arrange for someone to remain with you for the first 24 hrs following your surgery.    The Surgeon will speak to your family/visitor after your surgery regarding the outcome of your surgery and post op care.  The Surgeon may speak to you after your surgery, but there is a possibility you may not remember the details.  Please check with your family members regarding the conversation with the Surgeon.    We strongly recommend whoever is bringing you home be present for discharge instructions.  This will ensure a thorough understanding for your post op home care.          Thank you for your cooperation.  The Staff of [facility].            Bathing Instructions with Hibiclens    Shower the evening before and morning of your procedure with Chlorhexidine (Hibiclens)  do not use Chlorhexidine on your face or genitals. Do not get in your eyes.  Wash your face with water and your regular face wash/soap  Use your regular shampoo  Apply Chlorhexidine (Hibiclens) directly on your skin or on a wet washcloth and wash gently. When showering: Move away from the shower stream when applying Chlorhexidine (Hibiclens) to avoid rinsing off too soon.  Rinse thoroughly with warm water  Do not dilute Chlorhexidine (Hibiclens)   Dry off as usual, do not use any deodorant, powder, body lotions, perfume, after shave or cologne.

## 2024-01-04 NOTE — ANESTHESIA PREPROCEDURE EVALUATION
01/04/2024  Ca Coats is a 73 y.o., female.      Pre-op Assessment    I have reviewed the Patient Summary Reports.     I have reviewed the Nursing Notes. I have reviewed the NPO Status.   I have reviewed the Medications.     Review of Systems  Anesthesia Hx:             Denies Family Hx of Anesthesia complications.    Denies Personal Hx of Anesthesia complications.                    Hematology/Oncology:  Hematology Normal                     Current/Recent Cancer.            Oncology Comments: Stomach cancer    On Eliquis due to lack of mobility-will stop prior to procedure     EENT/Dental:  EENT/Dental Normal           Cardiovascular:     Hypertension               2/22  ·   The left ventricle is normal in size with normal systolic function. The estimated ejection fraction is 65%.  · Grade II left ventricular diastolic dysfunction.  · Normal right ventricular size with normal right ventricular systolic function.  · Biatrial enlargement.  · Mild tricuspid regurgitation.  · The estimated PA systolic pressure is 41 mmHg.  · There is pulmonary hypertension.  · Normal central venous pressure (3 mmHg).  · There is mobile annular calcifications attached to the nikhil-lateral part of annulus of the valve ( image 46). Clinical correlation is required.                              Pulmonary:       Recent URI Sleep Apnea                Renal/:  Renal/ Normal                 Hepatic/GI:     Denies Hiatal Hernia. GERD             Musculoskeletal:  Musculoskeletal Normal                Neurological:  Neurology Normal                                      Endocrine:   Hypothyroidism        Morbid Obesity / BMI > 40  Dermatological:  Skin Normal    Psych:  Psychiatric Normal                    Physical Exam  General: Well nourished and Alert    Airway:  Mallampati: II   Mouth Opening: Normal  Tongue:  Normal    Dental:  Intact        Anesthesia Plan  Type of Anesthesia, risks & benefits discussed:    Anesthesia Type: Gen ETT  Intra-op Monitoring Plan: Standard ASA Monitors  Post Op Pain Control Plan: multimodal analgesia  Induction:  IV  Airway Plan: Video  Informed Consent: Informed consent signed with the Patient and all parties understand the risks and agree with anesthesia plan.  All questions answered.   ASA Score: 3  Anesthesia Plan Notes: Labs ordered/EKG in Epic  On Ozempic  Should be off Eliquis    Ready For Surgery From Anesthesia Perspective.     .

## 2024-01-05 ENCOUNTER — TELEPHONE (OUTPATIENT)
Dept: INTERNAL MEDICINE | Facility: CLINIC | Age: 74
End: 2024-01-05
Payer: MEDICARE

## 2024-01-05 DIAGNOSIS — I50.32 CHRONIC DIASTOLIC CONGESTIVE HEART FAILURE: Chronic | ICD-10-CM

## 2024-01-05 DIAGNOSIS — R60.9 EDEMA, UNSPECIFIED TYPE: ICD-10-CM

## 2024-01-05 DIAGNOSIS — G47.33 OBSTRUCTIVE SLEEP APNEA: ICD-10-CM

## 2024-01-05 DIAGNOSIS — D64.9 ANEMIA, UNSPECIFIED TYPE: ICD-10-CM

## 2024-01-05 DIAGNOSIS — C16.9 GASTRIC ADENOCARCINOMA: ICD-10-CM

## 2024-01-05 DIAGNOSIS — R94.39 ABNORMAL CARDIOVASCULAR STRESS TEST: ICD-10-CM

## 2024-01-05 DIAGNOSIS — G89.29 OTHER CHRONIC PAIN: Primary | ICD-10-CM

## 2024-01-05 DIAGNOSIS — E03.9 HYPOTHYROIDISM, UNSPECIFIED TYPE: ICD-10-CM

## 2024-01-05 DIAGNOSIS — Z86.19 HISTORY OF CLOSTRIDIUM DIFFICILE INFECTION: ICD-10-CM

## 2024-01-05 DIAGNOSIS — K59.00 CONSTIPATION, UNSPECIFIED CONSTIPATION TYPE: ICD-10-CM

## 2024-01-05 DIAGNOSIS — K21.9 GASTROESOPHAGEAL REFLUX DISEASE WITHOUT ESOPHAGITIS: ICD-10-CM

## 2024-01-05 DIAGNOSIS — R42 POSTURAL DIZZINESS: ICD-10-CM

## 2024-01-05 DIAGNOSIS — F11.90 OPIOID USE: ICD-10-CM

## 2024-01-05 DIAGNOSIS — E78.00 PURE HYPERCHOLESTEROLEMIA: Chronic | ICD-10-CM

## 2024-01-05 DIAGNOSIS — Z72.0 TOBACCO ABUSE: Chronic | ICD-10-CM

## 2024-01-05 DIAGNOSIS — I10 ESSENTIAL HYPERTENSION: ICD-10-CM

## 2024-01-05 DIAGNOSIS — Z86.718 HISTORY OF DVT (DEEP VEIN THROMBOSIS): Chronic | ICD-10-CM

## 2024-01-05 DIAGNOSIS — R45.0 NERVOUS: ICD-10-CM

## 2024-01-05 NOTE — PROGRESS NOTES
[facility]  Progress Note    Patient Name: Ca Coats  MRN: [id number]  Date of Evaluation- 01/05/2024  PCP- Lei Garcia MD    Future cases for Isabel Coats [[id number]]       Case ID Status Date Time Casimiro Procedure Provider Location               [id number] [facility] 2/7/2024  8:00  ARTHROPLASTY, KNEE, TOTAL: Left: [facility] Dwight Hensley MD [[id number]] [facility] 2ND FLR    [id number] [facility] 1/10/2024  3:30 PM 90 HYSTEROSCOPY, WITH DILATION AND CURETTAGE OF UTERUS Pina Pickens MD [[id number]] [facility]            HPI:  Called to do a preop evaluation    Virtual pre op evaluation   Audio only -patient preference  Location of the patient- [place]  Consent obtained for virtual evaluation     Each patient to whom he or she provides medical services by telemedicine is:  (1) informed of the relationship between the physician and patient and the respective role of any other health care provider with respect to management of the patient; and (2) notified that he or she may decline to receive medical services by telemedicine and may withdraw from such care at any time.    Chief complaint-Preoperative evaluation , Perioperative Medical management, complication reduction plan      History of present illness- I had the pleasure of meeting this pleasant 73 y.o. lady in the pre op clinic prior to her elective Orthopedic , gynecological surgery, . The patient is new to me .     I have obtained the history by speaking to the patient and by reviewing the electronic health records.    Events leading up to surgery / History of presenting illness -      She has been troubled with severe  Left knee  pain for a long time  .   Pain increases with activity and decreases with percocet  She had not had any previous left knee surgeries    She could not have the knee replacement as she need to lose weight and she was going to have a lap band but she was diagnosed with gastric cancer for which she had surgery and  chemotherapy  She also had radiation treatment for the cancer    She has since lost weight and is down to 261.5      Postmenopausal bleeding   She finished with her menstrual cycles when she was about 42 years of age  She has been having abnormal vaginal bleeding for the last 2 months for which she had gynecology evaluation and is planning to have hysteroscopy and dilatation curettage of the uterus  She does not have any heavy vaginal bleeding, more than spotting  She is needing to change pads multiple times a day  She is mostly having vaginal bleeding at nighttime  She has not  currently sexually active and has not been sexually active in many years    Relevant health conditions of significance for the perioperative period/ History of presenting illness -    Health conditions of significance for the perioperative period      Opioid use   HTN  Deep vein thrombosis  Anticoagulation  Sleep apnea  BMI 46  Acid reflux  Gastric adenocarcinoma  May 2021Found on work up for bariatric surgery  DVT   Tobacco smoke       She lives in a ground level apartment complex  She lives by herself and and has help every day about 4-5 hours, Monday through Friday and her neighbor helps during the weekend  She has a son who is age 54 years in the daughter who is age 44 Y  Her daughter lives in [place] and her son lives in [place]      Subjective/ Objective:     Chief complaint-Preoperative evaluation, Perioperative Medical management, complication reduction plan     Active cardiac conditions- none    Revised cardiac risk index predictors- none    Functional capacity -Examples of physical activity  house work and can take a flight of stairs holding on to the railing----- She can undertake all the above activities without  chest pain,chest tightness, Shortness of breath , making her exercise tolerance more,   than 4 Mets.   Winded on exertion, better since lost weight     Review of Systems   Constitutional:  Negative for chills and  fever.        Losing weight on Ozempic   HENT:          Sleep apnea     Eyes:         No new visual changes   Respiratory:          No cough , phlegm    No Hemoptysis   Cardiovascular:         As noted   Gastrointestinal:           Bowel movements- Regular    Endocrine:        Prednisone use > 20 mg daily for 3 weeks- None   Genitourinary:  Negative for dysuria.        No urinary hesitancy    Musculoskeletal:         As above      Skin:  Negative for rash.   Neurological:  Negative for syncope.        No unilateral weakness   Hematological:         Current use of Anticoagulants  yes   Psychiatric/Behavioral:             No SI/HI     No vascular stenting             No anesthesia, bleeding, cardiac problems, PONV with previous surgeries/procedures.  Medications and Allergies reviewed in epic.   FH- No anesthesia,bleeding / venous thrombosis , heart disease in family - limited family history     Physical Exam  There were no vitals taken for this visit.      Investigations  [facility] and [facility]    Review of Medicine tests    EKG- I had independently reviewed the EKG from--Oct 2021  It was reported to be showing     Normal sinus rhythm   Normal ECG     Review of clinical lab tests:  Lab Results   Component Value Date    CREATININE 0.8 01/04/2024    HGB 11.5 (L) 01/04/2024     01/04/2024             Review of old records- Was done and information gathered regards to events leading to surgery and health conditions of significance in the perioperative period.        Preoperative cardiac risk assessment-  The patient does not have any active cardiac conditions . Revised cardiac risk index predictors- -0--.Functional capacity is more than 4 Mets. She will be undergoing a Gynecological , Orthopedic procedure that carries a Moderate Risk risk     The estimated risk of the rate of adverse cardiac outcomes 3.9 %    No further cardiac work up is indicated prior to proceeding with the surgery           American Society of Anesthesiologists Physical status classification ( ASA ) class: 3     Postoperative pulmonary complication risk assessment:      ARISCAT ( [place]et) risk index- risk class -  Low, if duration of surgery is under 3 hours, intermediate, if duration of surgery is over 3 hours          Assessment/Plan:     Chronic pain  Left knee pain   Planning on having knee replacement     Nervous  Gets nervous   On Celexa  I suggest monitoring the sodium as SIADH from Celexa  use and hypersecretion of ADH associated with surgery can reduce sodium in the perioperative period     Abnormal cardiovascular stress test  2021    Normal coronary arteries      Chronic diastolic congestive heart failure  Feb 2022    The left ventricle is normal in size with normal systolic function. The estimated ejection fraction is 65%.  Grade II left ventricular diastolic dysfunction.  Normal right ventricular size with normal right ventricular systolic function.  Biatrial enlargement.  Mild tricuspid regurgitation.  The estimated PA systolic pressure is 41 mmHg.  There is pulmonary hypertension.  Normal central venous pressure (3 mmHg).  There is mobile annular calcifications attached to the nikhil-lateral part of annulus of the valve ( image 46). Clinical correlation is required.    Not known to have Congestive heart failure    Diastolic Dysfunction:  I  suggest watching her fluid status perioperatively and to watch out for fluid overload in view of her Diastolic Dysfunction.  I suggest avoidance of the ordering of continuous IV fluids and if IV fluids are required ,to order for a specific duration of time and consider ordering lower IV fluid rate      Pulmonary arterial hypertension       WHO functional classification     Class 1 - No limitation ( with reference to fatigue or dyspnea, chest pain, or heart syncope)      Clinical classification - based on etiology     Group 2  Group 3       [place] heart association functional class        Class 2  Upon lifting heavier things         Suggest avoidance of Intra vascular volume over load or reduced pre load     I suggest that the anaesthesiologist be aware of the Pulmonary artery hypertension , so that sedation,  anesthetic plans can be made with consideration of Pulmonary Hypertension  .     Essential hypertension  On Amlodipine- Morning , Lisinopril - Evening     Hypertension-  Blood pressure is acceptable . I suggest continuation of --Amlodipine, Lisinopril------ during the entire perioperative period. I suggest blood pressure, electrolyte and renal function monitoring .I suggest addressing pain control as uncontrolled pain can increased blood pressure      Pure hypercholesterolemia  HLD-I  suggest continuation of statin during the entire perioperative period.     History of DVT (deep vein thrombosis)    Sept 2021  Thrombosis of the right popliteal, posterior tibial, and peroneal veins.     History of DVT  No PE  1 Episode  Associated with reduced mobility from getting Chemo, Radiation at that time,  No long journeys, cancer,no prior surgery, No Hospital stay, no HRT  Anticoagulated   IVC filter  - none    On Anticoagulation - Apixaban    Provoked DVT    - Cancer   - Reduced Mobility   - Tobacco     On on going Anticoagulation as - she is less mobile     No Afib         No contraindications to holding anticoagulation for surgery     Date of Gynecology surgery - 1/10  As per Surgeon Anesthesiologist -   Last day of  use pre op- 1/6      Date of Knee replacement surgery - 2/7 /2024  Type of Anesthesia- Regional  Last day of  use pre op- Feb 3 rd 2024     As per her understanding , holding Apixaban 3 days prior to EUS on 1 / 24          Gastric adenocarcinoma  Gastric adenocarcinoma  May 2021  Found on work up for bariatric surgery  Had Chemo   No , Radiation chemo in 2023    Having Endoscopy 1/24 to check on this    Has a port    Anemia  No overt GI/ Urinary blood losses   Hb Stable  WCC,  PLT-N    Hypothyroidism  No ongoing problem   TSH 2021- N  No overt Hypo/ Hyper thyroid symptoms       BMI 40.0-44.9, adult  Stated height - 5 f 6 inches  Weight 261.5   BMI 42.2    Weight related conditions     Known to have     HTN  Hyperlipidemia   Sleep apnea   Acid reflux   Osteoarthritis    Not troubled with / Not known to have     Type 2 Diabetes   Fatty liver       Encouraged weight loss     On Ozempic weekly  Saturday     Skip the dose before surgery     Gastroesophageal reflux disease    Does not sound Cardiac       GERD-  I suggest continuation of the Proton pump inhibitor in the perioperative period . I suggest aspiration precautions     Obstructive sleep apnea  On CPAP     Sleep apnea .Uses CPAP .Suggested bringing for hospital use . Informed the risk of worsening sleep apnea in the perioperative period and suggest using CPAP use any time in 24 hrs ( day or night )for planned sleep      I suggest  caution with usage of medication that can cause respiratory suppression in the perioperative period      Avoidance of  supine sleep, weight gain , alcoholic beverages , care with , sedative , CNS depressant use indicated  since all of these can worsen LAURA          Tobacco abuse  Was Using Nicotine patch as needed when she has the urge to smoking  Quit Smoking > 3 months      I suggested to consider stopping  smoking tobacco for its benefits in the jailene operative period and in the long term    I  Informed about risk of wound healing problem ,infection,lung complications,thrombosis with tobacco use     Suggested quitting tobacco     Offered tobacco cessation service     Not known to have COPD, Bronchitis, Emphysema, wheezing , chronic phlegm   No inhaler, oxygen use      Edema  Feet swelling  Equal         Not known to have Varicose veins   Does not eat Salted food   Not on NSAID use     Not known to have     Heart failure   Liver failure   Kidney failure     Edema- I suggested avoidance of added salt,avoidance  of NSAID's, unless advised or ordered  and suggested Limb elevation and gill hose use     Postural dizziness  Gets dizzy on position changes   Suggested to change positions gradually and to stay hydrated      Constipation  Not troubled     Beets helping     Contributors- Iron, Opioid     Suggested working on bowel movements in preparation for surgery   Constipation- I suggest giving laxative regimen as opioid use,reduced ambulation  can increase the constipation      History of Clostridium difficile infection  Antibiotic related  Suggested care with antibiotic use, Hospitalization      Opioid use  Percocet  up to 3 times a day   For Left knee pain  Care with Tylenol     Chronic continuous opioid use- In view of the opioid use, the patient may have opioid tolerance . I suggest considering the possibility of opioid tolerance  in planning post operative pain control     Discussed possibly reducing the opioid use in preparation for surgery , so that it reduces the opioid tolerance which helps post op pain control     Suggested letting pain management know about the up coming surgery       She is willing to reduce the opioid in preparation for surgery  Suggested avoidance of abruptly stopping opioid        Preventive perioperative care    Thromboembolic prophylaxis:  Her risk factors for thrombosis include surgical procedure, previous history of thrombosis, and age.I suggest  thromboembolic prophylaxis ( mechanical/pharmacological, weighing the risk benefits of pharmacological agent use considering [facility] procedural bleeding )  during the perioperative period.I suggested being active in the post operative period.  Ortho surgery- The patient is a candidate for extended DVT prophylaxis      Postoperative pulmonary complication prophylaxis-Risk factors for post operative pulmonary complications include ASA class >2- I suggest incentive spirometry use, early ambulation, and end tidal carbon dioxide monitoring  , oral care ,  head end of bed elevation     Renal complication prophylaxis-Risk factors for renal complications include hypertension . I suggest keeping her well hydrated  in the perioperative period      Surgical site Infection Prophylaxis-I  suggest appropriate antibiotic for Prophylaxis against Surgical site infections  No reported Staph infection      Delirium prophylaxis-Risk factors - opioid use - I suggest avoidance / minimizing the use of  Benzodiazepines ( unless the patient has been taking it on a regular basis ),Anticholinergic medication,Antihistamines ( like  Benadryl).I suggest minimizing the use of opioid medication and use of IV tylenol,if it is appropriate. I suggest using the lowest possible dose of opioids for the shortest duration possible in the perioperative period. I suggest to Keep shades/blinds open during the day, lights off and shades closed at night to encourage normal sleep/wake cycle.I encourage the presence of the family member with the patient at all times, if at all possible as mental status changes can be picked up early by the family members and they help with reorientation. I encouraged the presence of family to help with orientation in the perioperative period. Benadryl avoidance suggested      In view of gynecological procedure , Regional anesthesia the patient  is at risk of postoperative urinary retention.  I suggest avoidance / minimizing the of  Benzodiazepines,Anticholinergic medication,antihistamines ( Benadryl) , if possible in the perioperative period. I suggest using the minimum possible use of opioids for the minimum period of time in the perioperative period. Benadryl avoidance suggested      This visit was focused on Preoperative evaluation, Perioperative Medical management, complication reduction plans. I suggest that the patient follows up with primary care or relevant sub specialists for ongoing health care.    I appreciate the opportunity to be involved in this patients care.  Please feel free to contact me if there were any questions about this consultation.    Patient is optimized    Lela Stevens MD  Perioperative Medicine  [facility]     Patient/ care giver/ Family member was instructed to call and update me about any changes to health,  medication, office visits ,testing out side of the [facility] operative care center , hospitalizations between now and surgery      COVID vaccine Yes  No history of COVID infection     COVID screening     No fever   No cough   No sore throat   No loss of taste or smell   No muscle aches   No nausea, vomiting , diarrhea-     She is conscious coherent   No labored breathing     Limitation of virtual evaluation discussed - Offered to come to the office for examination on 1/8 or 1/9 - Transportation reasons  Checked for OTC medication   She received medication instructions from [facility] - She has no questions on the instructions that she received

## 2024-01-05 NOTE — HPI
Called to do a preop evaluation    Virtual pre op evaluation   Audio only -patient preference  Location [place] the [place]- [place]  Consent obtained for virtual evaluation     Each patient to whom he or she provides medical services by telemedicine is:  (1) informed of the relationship between the physician and patient and the respective role of any other health care provider with respect to management of the patient; and (2) notified that he or she may decline to receive medical services by telemedicine and may withdraw from such care at any time.    Chief complaint-Preoperative evaluation , Perioperative Medical management, complication reduction plan      History of present illness- I had the pleasure of meeting this pleasant 73 y.o. lady in the pre op clinic prior to her elective Orthopedic , gynecological surgery, . The patient is new to me .     I have obtained the history by speaking to the patient and by reviewing the electronic health records.    Events leading up to surgery / History of presenting illness -      She has been troubled with severe  Left knee  pain for a long time  .   Pain increases with activity and decreases with percocet  She had not had any previous left knee surgeries    She could not have the knee replacement as she need to lose weight and she was going to have a lap band but she was diagnosed with gastric cancer for which she had surgery and chemotherapy  She also had radiation treatment for the cancer    She has since lost weight and is down to 261.5      Postmenopausal bleeding   She finished with her menstrual cycles when she was about 42 years of age  She has been having abnormal vaginal bleeding for the last 2 months for which she had gynecology evaluation and is planning to have hysteroscopy and dilatation curettage of the uterus  She does not have any heavy vaginal bleeding, more than spotting  She is needing to change pads multiple times a day  She is mostly having vaginal bleeding at  nighttime  She has not  currently sexually active and has not been sexually active in many years    Relevant health conditions of significance for the perioperative period/ History of presenting illness -    Health conditions of significance for the perioperative period      Opioid use   HTN  Deep vein thrombosis  Anticoagulation  Sleep apnea  BMI 46  Acid reflux  Gastric adenocarcinoma  May 2021Found on work up for bariatric surgery  DVT   Tobacco smoke       She lives in a ground level apartment complex  She lives by herself and and has help every day about 4-5 hours, Monday through Friday and her neighbor helps during the weekend  She has a son who is age 54 years in the daughter who is age 44 Y  Her daughter lives in [place] and her son lives in [place]

## 2024-01-06 DIAGNOSIS — N95.0 POSTMENOPAUSAL BLEEDING: Primary | ICD-10-CM

## 2024-01-06 NOTE — ASSESSMENT & PLAN NOTE
Sept 2021  Thrombosis of the right popliteal, posterior tibial, and peroneal veins.     History of DVT  No PE  1 Episode  Associated with reduced mobility from getting Chemo, Radiation at that time,  No long journeys, cancer,no prior surgery, No Hospital stay, no HRT  Anticoagulated   IVC filter  - none    On Anticoagulation - Apixaban    Provoked DVT    - Cancer   - Reduced Mobility   - Tobacco     On on going Anticoagulation as - she is less mobile     No Afib         No contraindications to holding anticoagulation for surgery     Date of Gynecology surgery - 1/10  As per Surgeon Anesthesiologist -   Last day of  use pre op- 1/6      Date of Knee replacement surgery - 2/7 /2024  Type of Anesthesia- Regional  Last day of  use pre op- Feb 3 rd 2024     As per her understanding , holding Apixaban 3 days prior to EUS on 1 / 24

## 2024-01-06 NOTE — ASSESSMENT & PLAN NOTE
Was Using Nicotine patch as needed when she has the urge to smoking  Quit Smoking > 3 months      I suggested to consider stopping  smoking tobacco for its benefits in the jailene operative period and in the long term    I  Informed about risk of wound healing problem ,infection,lung complications,thrombosis with tobacco use     Suggested quitting tobacco     Offered tobacco cessation service     Not known to have COPD, Bronchitis, Emphysema, wheezing , chronic phlegm   No inhaler, oxygen use

## 2024-01-06 NOTE — ASSESSMENT & PLAN NOTE
Stated height - 5 f 6 inches  Weight 261.5   BMI 42.2    Weight related conditions     Known to have     HTN  Hyperlipidemia   Sleep apnea   Acid reflux   Osteoarthritis    Not troubled with / Not known to have     Type 2 Diabetes   Fatty liver       Encouraged weight loss     On Ozempic weekly  Saturday     Skip the dose before surgery

## 2024-01-06 NOTE — ASSESSMENT & PLAN NOTE
On CPAP     Sleep apnea .Uses CPAP .Suggested bringing for hospital use . Informed the risk of worsening sleep apnea in the perioperative period and suggest using CPAP use any time in 24 hrs ( day or night )for planned sleep      I suggest  caution with usage of medication that can cause respiratory suppression in the perioperative period      Avoidance of  supine sleep, weight gain , alcoholic beverages , care with , sedative , CNS depressant use indicated  since all of these can worsen LAURA

## 2024-01-06 NOTE — ASSESSMENT & PLAN NOTE
Feet swelling  Equal         Not known to have Varicose veins   Does not eat Salted food   Not on NSAID use     Not known to have     Heart failure   Liver failure   Kidney failure     Edema- I suggested avoidance of added salt,avoidance of NSAID's, unless advised or ordered  and suggested Limb elevation and gill hose use

## 2024-01-06 NOTE — ASSESSMENT & PLAN NOTE
Does not sound Cardiac       GERD-  I suggest continuation of the Proton pump inhibitor in the perioperative period . I suggest aspiration precautions

## 2024-01-06 NOTE — ASSESSMENT & PLAN NOTE
Gets nervous   On Celexa  I suggest monitoring the sodium as SIADH from Celexa  use and hypersecretion of ADH associated with surgery can reduce sodium in the perioperative period

## 2024-01-06 NOTE — ASSESSMENT & PLAN NOTE
Gastric adenocarcinoma  May 2021  Found on work up for bariatric surgery  Had Chemo   No , Radiation chemo in 2023    Having Endoscopy 1/24 to check on this    Has a port

## 2024-01-06 NOTE — ASSESSMENT & PLAN NOTE
Feb 2022    The left ventricle is normal in size with normal systolic function. The estimated ejection fraction is 65%.  Grade II left ventricular diastolic dysfunction.  Normal right ventricular size with normal right ventricular systolic function.  Biatrial enlargement.  Mild tricuspid regurgitation.  The estimated PA systolic pressure is 41 mmHg.  There is pulmonary hypertension.  Normal central venous pressure (3 mmHg).  There is mobile annular calcifications attached to the nikhil-lateral part of annulus of the valve ( image 46). Clinical correlation is required.    Not known to have Congestive heart failure    Diastolic Dysfunction:  I  suggest watching her fluid status perioperatively and to watch out for fluid overload in view of her Diastolic Dysfunction.  I suggest avoidance of the ordering of continuous IV fluids and if IV fluids are required ,to order for a specific duration of time and consider ordering lower IV fluid rate      Pulmonary arterial hypertension       WHO functional classification     Class 1 - No limitation ( with reference to fatigue or dyspnea, chest pain, or heart syncope)      Clinical classification - based on etiology     Group 2  Group 3       [place] heart association functional class       Class 2  Upon lifting heavier things         Suggest avoidance of Intra vascular volume over load or reduced pre load     I suggest that the anaesthesiologist be aware of the Pulmonary artery hypertension , so that sedation,  anesthetic plans can be made with consideration of Pulmonary Hypertension  .

## 2024-01-06 NOTE — ASSESSMENT & PLAN NOTE
On Amlodipine- Morning , Lisinopril - Evening     Hypertension-  Blood pressure is acceptable . I suggest continuation of --Amlodipine, Lisinopril------ during the entire perioperative period. I suggest blood pressure, electrolyte and renal function monitoring .I suggest addressing pain control as uncontrolled pain can increased blood pressure

## 2024-01-06 NOTE — ASSESSMENT & PLAN NOTE
Not troubled     Beets helping     Contributors- Iron, Opioid     Suggested working on bowel movements in preparation for surgery   Constipation- I suggest giving laxative regimen as opioid use,reduced ambulation  can increase the constipation

## 2024-01-06 NOTE — OUTPATIENT SUBJECTIVE & OBJECTIVE
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   Winded on exertion, better since lost weight     Review of Systems   Constitutional:  Negative for chills and fever.        Losing weight on Ozempic   HENT:          Sleep apnea     Eyes:         No new visual changes   Respiratory:          No cough , phlegm    No Hemoptysis   Cardiovascular:         As noted   Gastrointestinal:           Bowel movements- Regular    Endocrine:        Prednisone use > 20 mg daily for 3 weeks- None   Genitourinary:  Negative for dysuria.        No urinary hesitancy    Musculoskeletal:         As above      Skin:  Negative for rash.   Neurological:  Negative for syncope.        No unilateral weakness   Hematological:         Current use of Anticoagulants  yes   Psychiatric/Behavioral:             No SI/HI     No vascular stenting             No anesthesia, bleeding, cardiac problems, PONV with previous surgeries/procedures.  Medications and Allergies reviewed in epic.   FH- No anesthesia,bleeding / venous thrombosis , heart disease in family - limited family history     Physical Exam  There were no vitals taken for this visit.      Investigations  Lab and Imaging have been reviewed in [facility].    Review of Medicine tests    EKG- I had independently reviewed the EKG from--Oct 2021  It was reported to be showing     Normal sinus rhythm   Normal ECG     Review of clinical lab tests:  Lab Results   Component Value Date    CREATININE 0.8 01/04/2024    HGB 11.5 (L) 01/04/2024      01/04/2024             Review of old records- Was done and information gathered regards to events leading to surgery and health conditions of significance in the perioperative period.    Outpatient Subjective & Objective

## 2024-01-06 NOTE — ASSESSMENT & PLAN NOTE
Percocet  up to 3 times a day   For Left knee pain  Care with Tylenol     Chronic continuous opioid use- In view of the opioid use, the patient may have opioid tolerance . I suggest considering the possibility of opioid tolerance  in planning post operative pain control     Discussed possibly reducing the opioid use in preparation for surgery , so that it reduces the opioid tolerance which helps post op pain control     Suggested letting pain management know about the up coming surgery       She is willing to reduce the opioid in preparation for surgery  Suggested avoidance of abruptly stopping opioid

## 2024-01-08 ENCOUNTER — CLINICAL SUPPORT (OUTPATIENT)
Dept: REHABILITATION | Facility: OTHER | Age: 74
End: 2024-01-08
Payer: MEDICARE

## 2024-01-08 ENCOUNTER — TELEPHONE (OUTPATIENT)
Dept: HEMATOLOGY/ONCOLOGY | Facility: CLINIC | Age: 74
End: 2024-01-08
Payer: MEDICARE

## 2024-01-08 ENCOUNTER — TELEPHONE (OUTPATIENT)
Dept: INTERNAL MEDICINE | Facility: CLINIC | Age: 74
End: 2024-01-08
Payer: MEDICARE

## 2024-01-08 DIAGNOSIS — M17.12 PRIMARY OSTEOARTHRITIS OF LEFT KNEE: ICD-10-CM

## 2024-01-08 DIAGNOSIS — Z74.09 IMPAIRED FUNCTIONAL MOBILITY, BALANCE, GAIT, AND ENDURANCE: Primary | ICD-10-CM

## 2024-01-08 DIAGNOSIS — M25.662 DECREASED RANGE OF MOTION OF LEFT KNEE: ICD-10-CM

## 2024-01-08 DIAGNOSIS — M62.81 MUSCLE WEAKNESS: ICD-10-CM

## 2024-01-08 PROCEDURE — 97530 THERAPEUTIC ACTIVITIES: CPT | Mod: HCNC,PN

## 2024-01-08 PROCEDURE — 97162 PT EVAL MOD COMPLEX 30 MIN: CPT | Mod: HCNC,PN

## 2024-01-08 NOTE — PROGRESS NOTES
[facility]  Physical Therapy Initial Evaluation    Name: Ca Coats  Clinic Number: [id number]    Therapy Diagnosis:   Encounter Diagnoses   Name Primary?    Primary osteoarthritis of left knee     Impaired functional mobility, balance, gait, and endurance Yes    Decreased range of motion of left knee     Muscle weakness      Physician: Dwight Hensley MD    Physician Orders: PT Eval and Treat   Medical Diagnosis: M17.12 (ICD-10-CM) - Primary osteoarthritis of left knee   Evaluation Date: 1/8/2024  Authorization Period Expiration: 12/6/2024  Plan of Care Certification Period: 2/7/2024  Visit # / Visits authorized: 1/ 1    Time In: 0924  Time Out: 1004  Total Billable Time separate from evaluation: 25 minutes    Precautions: Standard    Subjective   Date of onset: chronic  History of current condition - Isabel is referred to outpatient physical therapy for prehab left knee prior to her left TKR scheduled on 2/7/2024       Past Medical History:   Diagnosis Date    YOUNG (acute kidney injury) 10/15/2021    Anemia     2018    Arthritis     BMI 60.0-69.9, adult     Cataract     Chronic diastolic congestive heart failure 01/27/2021    Deep vein thrombosis     Depression     Dry eye syndrome     DVT of deep femoral vein, right 05/29/2023    Gastric adenocarcinoma 05/25/2021    GERD (gastroesophageal reflux disease)     Glaucoma suspect     History of gastric ulcer     History of psychiatric hospitalization     Hx of psychiatric care     Celexa, no recall of charted Effexor, Lexapro, Desyrel prescriptions    Hyperlipidemia     Hypertension     Hypothyroidism     Morbid obesity     Neuromuscular disorder     Sleep apnea     Thyroid disease      Ca Coats  has a past surgical history that includes Appendectomy; Injection of anesthetic agent around nerve (Left, 07/10/2018); Cataract extraction w/  intraocular lens implant (Bilateral); Injection of anesthetic agent around  nerve (Left, 07/19/2019); Radiofrequency ablation (Left, 06/19/2020); Radiofrequency ablation (Left, 01/22/2021); Esophagogastroduodenoscopy (N/A, 02/05/2021); Tonsillectomy; Cardiac surgery; Eye surgery; Coronary angiography (Bilateral, 02/24/2021); Endoscopic ultrasound of upper gastrointestinal tract (N/A, 03/17/2021); Esophagogastroduodenoscopy (N/A, 03/17/2021); Esophagogastroduodenoscopy (N/A, 05/25/2021); Insertion of tunneled central venous catheter (CVC) with subcutaneous port (N/A, 07/09/2021); Radiofrequency ablation (Left, 07/30/2021); Endoscopic ultrasound of upper gastrointestinal tract (N/A, 01/13/2022); Esophagogastroduodenoscopy (N/A, 01/13/2022); Radiofrequency ablation (Left, 04/22/2022); Esophagogastroduodenoscopy (N/A, 10/11/2022); Endoscopic ultrasound of upper gastrointestinal tract (N/A, 10/11/2022); and Radiofrequency ablation (Left, 12/23/2022).    Ca has a current medication list which includes the following prescription(s): acetaminophen, amlodipine, atorvastatin, b-complex with vitamin c, calcium citrate/vitamin d2, citalopram, dextran 70-hypromellose, eliquis, ferrous gluconate, furosemide, gabapentin, lidocaine-prilocaine, lisinopril, multivitamin with minerals, oxycodone-acetaminophen, pantoprazole, and ozempic, and the following Facility-Administered Medications: sodium chloride 0.9%.    Review of patient's allergies indicates:  No Known Allergies     Falls: none    Imaging,   6/14/2023  FINDINGS:  Mild DJD.  Mild narrowing of the patellofemoral and the medial tibiofemoral joint spaces.  No fracture or dislocation.  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 Has shower chair, raise toilet seat, bedside commode  Current Level of Function: ambulating with standard walker. Has shower chair . Raised toilet seat, bedside  "commode    Pain:  Current 7/10, worst 10/10, best 4/10   Location: left knee   Description: harp and strong  Aggravating Factors: sitting and walking  Easing Factors: pain medication and rest    Pts goals: "I need to be walking by myself and be able to do as much as I can for myself."    Objective     Functional assessment:   - walking: ambulating with standard walker  - sit to stand: mod Independent     AROM  LE MMT  R  L    Hip flexion  4/5  4/5    Hip abduction  4/5  3/5    Hip extension  3+/5  3+/5    Hip ER  5/5  5/5    Hip IR  4/5  4-/5    Knee extension  5/5  5/5    Knee flexion  5/5  4/5    Ankle dorsiflexion  5/5  5/5    Ankle plantar flexion  5/5  5/5    Ankle inversion  5/5  5/5    Ankle eversion  5/5  5/5        Flexibility testing:  - hamstrings:            bilateral: within normal limits   - gastrocnemius:     bilateral: tight, right: neutral, left: neutral     Joint mobility:  Right knee active range of motion: 3 - 0 - 115 degrees   Left knee active range of motion: 0 -  110 degrees     Intake Outcome Measure for FOTO Knee Survey    Therapist reviewed FOTO scores for Ca Coats on 1/8/2024.   FOTO report - see Media section or FOTO account episode details.    Intake Score: 31%       TREATMENT   Treatment Time In: 0937  Treatment Time Out: 1002  Total Treatment time separate from Evaluation time: 25 minutes    Isabel received therapeutic activities to develop strength, ROM, and flexibility for 25 minutes including:  INstructed patient in home exercise program and reviewed post operative protocol  Long arc quadriceps x 10 repetitions   Ankle pumps x 20 repetitions   Heel slides x 10 repetitions   Quadriceps set 20 x 5"  Supine hang 3'  Straight leg raises x 10 repetitions     Home Exercises Provided and Patient Education Provided     Education provided:   - Discussed the role of the PTA on the Rehab Team. Discussed patient will be seen by a physical therapist minimally every 6th visit or " every 30 days prior to being seen by PTA. Also discussed the use of the [facility] for communication.    Ankle pumps  Long arc quadriceps   Heel slides  Quadriceps set  Supine hang    Written Home Exercises Provided: yes.  Exercises were reviewed and Isabel was able to demonstrate them prior to the end of the session.  Isabel demonstrated good  understanding of the education provided.     See EMR under Patient Instructions for exercises provided 1/8/2024.    Assessment   Ca is a 73 y.o. female referred to outpatient Physical Therapy with a medical diagnosis of M17.12 (ICD-10-CM) - Primary osteoarthritis of left knee . Patient presents with decreased range of motion in left knee, decreased strength and flexibility in bilateral lower extremities, and impaired gait. Patient is scheduled for left total knee replacement. Patient accompanied to session today by her aide who will be with her after the surgery. Proceeded with instruction and performance of home exercise program. Therapist provided written copy of exercises. Discussed post operative surgical protocol. Discussed the importance of full extension, use of ice for pain and edema control, knee position following surgery. Also stressed and instructed in quad sets/activation and ankle pumps. Visible quadriceps activation noted today.    Patient prognosis is Good.   Patient will benefit from skilled outpatient Physical Therapy to address the deficits stated above and in the chart below, provide patient/family education, and to maximize patient's level of independence.     Plan of care discussed with patient: Yes  Patient's spiritual, cultural and educational needs considered and patient is agreeable to the plan of care and goals as stated below:     Anticipated Barriers for therapy: none    Medical Necessity is demonstrated by the following:      Medical Necessity is demonstrated by the following  History  Co-morbidities and personal factors that may  impact the plan of care [] LOW: no personal factors / co-morbidities  [] MODERATE: 1-2 personal factors / co-morbidities  [x] HIGH: 3+ personal factors / co-morbidities    Moderate / High Support Documentation:   Co-morbidities affecting plan of care: arthritis, HTN, Hyperlipidemia, neuromuscular disorder, high BMI    Personal Factors:   no deficits     Examination  Body Structures and Functions, activity limitations and participation restrictions that may impact the plan of care [] LOW: addressing 1-2 elements  [] MODERATE: 3+ elements  [x] HIGH: 4+ elements (please support below)    Moderate / High Support Documentation: decreased knee range of motion, muscle weakness, impaired gait, impaired transfers     Clinical Presentation [] LOW: stable  [x] MODERATE: Evolving  [] HIGH: Unstable     Decision Making/ Complexity Score: moderate         Goals:  Short Term Goals: 4 weeks   Independent with home exercise program .      Plan   Certification Period/Plan of care expiration: 1/8/2024 to 2/7/2024.    Outpatient Physical Therapy 1-2 prehab visit to include the following interventions: Gait Training, Manual Therapy, Moist Heat/ Ice, Neuromuscular Re-ed, Patient Education, Therapeutic Activities, and Therapeutic Exercise.     Gideon Krishnamurthy, PT

## 2024-01-08 NOTE — PROGRESS NOTES
Called to follow up , spoke to her  to address any concerns with the up coming surgery or any questions on Medication -instructions   She could not come to the office for an office examination  Doing well ,No changes to Medication, Health -

## 2024-01-08 NOTE — PROGRESS NOTES
History & Physical  Gynecology      SUBJECTIVE:     Chief Complaint: Procedure       History of Present Illness:  72 y/o  presents for scheduled hysteroscopy/D&C for PMB/thickened EMS. She was initially seen by me last month with report of intermittent bright red vaginal bleeding for 1-2 months. EMBx done that day obtained minimal tissue and pathology was benign. Pelvic US done noting EMS 9 mm.    Review of patient's allergies indicates:  No Known Allergies    Past Medical History:   Diagnosis Date    YOUNG (acute kidney injury) 10/15/2021    Anemia     2018    Arthritis     BMI 60.0-69.9, adult     Cataract     Chronic diastolic congestive heart failure 2021    Deep vein thrombosis     Depression     Dry eye syndrome     DVT of deep femoral vein, right 2023    Gastric adenocarcinoma 2021    GERD (gastroesophageal reflux disease)     Glaucoma suspect     History of gastric ulcer     History of psychiatric hospitalization     Hx of psychiatric care     Celexa, no recall of charted Effexor, Lexapro, Desyrel prescriptions    Hyperlipidemia     Hypertension     Hypothyroidism     Morbid obesity     Neuromuscular disorder     Sleep apnea     Thyroid disease      Past Surgical History:   Procedure Laterality Date    APPENDECTOMY      CARDIAC SURGERY      CATARACT EXTRACTION W/  INTRAOCULAR LENS IMPLANT Bilateral     MFIOL OU    CORONARY ANGIOGRAPHY Bilateral 2021    Procedure: ANGIOGRAM, CORONARY ARTERY;  Surgeon: Cresencio Ridley MD;  Location: [facility] CATH LAB;  Service: Cardiology;  Laterality: Bilateral;  Covid test needed - ok per Danay MORENOU. Pt. w/o transportation or family    ENDOSCOPIC ULTRASOUND OF UPPER GASTROINTESTINAL TRACT N/A 2021    Procedure: ULTRASOUND, UPPER GI TRACT, ENDOSCOPIC;  Surgeon: Gerald Anaya MD;  Location: [facility] ENDO ([address] [place] [address]);  Service: Endoscopy;  Laterality: N/A;  Pt unable to get a ride for swab. Will do rapid test at 8:30 - ttr    ENDOSCOPIC  ULTRASOUND OF UPPER GASTROINTESTINAL TRACT N/A 01/13/2022    Procedure: ULTRASOUND, UPPER GI TRACT, ENDOSCOPIC;  Surgeon: Kevin Carballo MD;  Location: [facility] ENDO (2ND FLR);  Service: Endoscopy;  Laterality: N/A;  blood thinner approval received, see telephone encounter 1/7/22-BB  rapid  instructions given verbally and sent to address on file in pt's chart-BB    ENDOSCOPIC ULTRASOUND OF UPPER GASTROINTESTINAL TRACT N/A 10/11/2022    Procedure: ULTRASOUND, UPPER GI TRACT, ENDOSCOPIC;  Surgeon: Dequan Ridley MD;  Location: [facility] ENDO (2ND FLR);  Service: Endoscopy;  Laterality: N/A;    ESOPHAGOGASTRODUODENOSCOPY N/A 02/05/2021    Procedure: EGD (ESOPHAGOGASTRODUODENOSCOPY);  Surgeon: Matthew Hernadez MD;  Location: [facility] ENDO (2ND FLR);  Service: Endoscopy;  Laterality: N/A;  BMI-58    Wt: 361#     COVID test at [facility] on 2/2-GT    ESOPHAGOGASTRODUODENOSCOPY N/A 03/17/2021    Procedure: EGD (ESOPHAGOGASTRODUODENOSCOPY);  Surgeon: Gerald Anaya MD;  Location: [facility] ENDO (2ND FLR);  Service: Endoscopy;  Laterality: N/A;    ESOPHAGOGASTRODUODENOSCOPY N/A 05/25/2021    Procedure: EGD (ESOPHAGOGASTRODUODENOSCOPY);  Surgeon: Kevin Carballo MD;  Location: [facility] ENDO (2ND FLR);  Service: Endoscopy;  Laterality: N/A;  ESD 2 hours  Full COVID vaccination on file. ttr    ESOPHAGOGASTRODUODENOSCOPY N/A 01/13/2022    Procedure: EGD (ESOPHAGOGASTRODUODENOSCOPY);  Surgeon: Kevin Carballo MD;  Location: [facility] ENDO (2ND FLR);  Service: Endoscopy;  Laterality: N/A;  blood thinner approval, see telephone encounter 1/7/22-BB  rapid  instructions given verbally and sent to address on file in pt's chart-BB    ESOPHAGOGASTRODUODENOSCOPY N/A 10/11/2022    Procedure: EGD (ESOPHAGOGASTRODUODENOSCOPY);  Surgeon: Dequan Ridley MD;  Location: [facility] ENDO (2ND FLR);  Service: Endoscopy;  Laterality: N/A;  She is do for EGD/EUS now. Personal history of gastric cancer. Ca Coats #[id number].   Thanks,   Kevin Carballo. MD    Pt is fully  vaccinated-DS  9/14/22-Approval to hold Eliquis rec'd from Dr. Pelaez (see telephone encounter 9/14/22)-DS  9/14/22-Ins    EYE SURGERY      INJECTION OF ANESTHETIC AGENT AROUND NERVE Left 07/10/2018    Procedure: BLOCK, NERVE;  Surgeon: Samantha Vidal MD;  Location: [facility] PAIN MGT;  Service: Pain Management;  Laterality: Left;  Genicular     INJECTION OF ANESTHETIC AGENT AROUND NERVE Left 07/19/2019    Procedure: Block, Nerve NERVE BLOCK GENICULAR WITH PHENOL 6%;  Surgeon: Samantha Vidal MD;  Location: [facility] PAIN MGT;  Service: Pain Management;  Laterality: Left;  NEEDS CONSENT    INSERTION OF TUNNELED CENTRAL VENOUS CATHETER (CVC) WITH SUBCUTANEOUS PORT N/A 07/09/2021    Procedure: INSERTION, PORT-A-CATH;  Surgeon: Mario Paz MD;  Location: [facility] CATH LAB;  Service: Radiology;  Laterality: N/A;  PORT PLACEMENT    RADIOFREQUENCY ABLATION Left 06/19/2020    Procedure: RADIOFREQUENCY ABLATION LEFT GENICULAR;  Surgeon: Tevin Clark MD;  Location: [facility] PAIN MGT;  Service: Pain Management;  Laterality: Left;  Left Genicular RFA    RADIOFREQUENCY ABLATION Left 01/22/2021    Procedure: RADIOFREQUENCY ABLATION LEFT GENICULAR;  Surgeon: Tevin Clark MD;  Location: [facility] PAIN MGT;  Service: Pain Management;  Laterality: Left;    RADIOFREQUENCY ABLATION Left 07/30/2021    Procedure: RADIOFREQUENCY ABLATION, GENICULAR COOLED NEED CONSENT;  Surgeon: Tevin Clark MD;  Location: [facility] PAIN MGT;  Service: Pain Management;  Laterality: Left;    RADIOFREQUENCY ABLATION Left 04/22/2022    Procedure: RADIOFREQUENCY ABLATION, GENICULAR COOLED , LEFT;  Surgeon: Tevin Clark MD;  Location: [facility] PAIN MGT;  Service: Pain Management;  Laterality: Left;    RADIOFREQUENCY ABLATION Left 12/23/2022    Procedure: RADIOFREQUENCY ABLATION LEFT GENICULAR COOLED CLEARED TO HOLD ELIQUIS 3 DAYS  NEEDS CONSENT;  Surgeon: Tevin Clark MD;  Location: [facility] PAIN MGT;  Service: Pain Management;  Laterality: Left;    TONSILLECTOMY        OB History          2    Para   2    Term   2            AB        Living             SAB        IAB        Ectopic        Multiple        Live Births                   Family History   Problem Relation Age of Onset    No Known Problems Mother     No Known Problems Father     Colon cancer Neg Hx     Esophageal cancer Neg Hx      Social History     Tobacco Use    Smoking status: Former     Types: Cigarettes     Start date:     Smokeless tobacco: Never    Tobacco comments:     currently smoking <5 cigarettes most days   Substance Use Topics    Alcohol use: Yes     Comment: rarely    Drug use: No       Current Outpatient Medications   Medication Sig    acetaminophen (TYLENOL) 500 MG tablet     amLODIPine (NORVASC) 10 MG tablet TAKE 1 TABLET EVERY DAY    atorvastatin (LIPITOR) 20 MG tablet TAKE 1 TABLET EVERY DAY    B-complex with vitamin C (Z-BEC OR EQUIV) tablet Take 1 tablet by mouth once daily.     CALCIUM CITRATE-VITAMIN D2 ORAL Take 1 tablet by mouth once daily.     citalopram (CELEXA) 10 MG tablet TAKE 1 TABLET EVERY DAY    dextran 70-hypromellose (TEARS) ophthalmic solution Place 1 drop into the right eye every 6 (six) hours.    ELIQUIS 5 mg Tab TAKE 1 TABLET TWICE DAILY    ferrous gluconate (FERGON) 324 MG tablet TAKE 1 TABLET (324 MG TOTAL) BY MOUTH DAILY WITH BREAKFAST.    furosemide (LASIX) 80 MG tablet TAKE 1 TABLET TWICE DAILY (Patient taking differently: Take 80 mg by mouth 2 (two) times a day.)    gabapentin (NEURONTIN) 300 MG capsule Take 2 capsules (600 mg total) by mouth 3 (three) times daily.    LIDOcaine-prilocaine (EMLA) cream Apply topically as needed (prior to port access). (Patient not taking: Reported on 2024)    lisinopriL (PRINIVIL,ZESTRIL) 40 MG tablet TAKE 1 TABLET EVERY DAY (Patient taking differently: every evening. TAKE 1 TABLET EVERY NIGHT)    multivitamin with minerals tablet Take 1 tablet by mouth once daily.     oxyCODONE-acetaminophen (PERCOCET) 5-325 mg  per tablet Take 1 tablet by mouth every 8 (eight) hours as needed for Pain.    pantoprazole (PROTONIX) 40 MG tablet TAKE 1 TABLET EVERY DAY    semaglutide (OZEMPIC) 1 mg/dose (4 mg/3 mL) INJECT 1 MG INTO THE SKIN EVERY 7 DAYS.     No current facility-administered medications for this visit.     Facility-Administered Medications Ordered in Other Visits   Medication    0.9%  NaCl infusion         Review of Systems:  Review of Systems   Constitutional:  Negative for chills, fatigue, fever and unexpected weight change.   Respiratory:  Negative for cough, shortness of breath and wheezing.    Cardiovascular:  Negative for chest pain and palpitations.   Gastrointestinal:  Negative for abdominal pain, constipation, diarrhea, nausea and vomiting.   Genitourinary:  Negative for dyspareunia, hematuria, menorrhagia, pelvic pain, vaginal bleeding, vaginal discharge and vaginal pain.   Musculoskeletal:  Negative for myalgias.   Neurological:  Negative for syncope and headaches.   Hematological:  Negative for adenopathy.   Psychiatric/Behavioral:  The patient is not nervous/anxious.         OBJECTIVE:     Physical Exam:  Physical Exam  Constitutional:       General: She is not in acute distress.     Appearance: She is well-developed.   HENT:      Head: Normocephalic and atraumatic.   Eyes:      Extraocular Movements: Extraocular movements intact.   Cardiovascular:      Rate and Rhythm: Normal rate.      Heart sounds: Normal heart sounds. No murmur heard.     No gallop.   Pulmonary:      Effort: Pulmonary effort is normal.   Abdominal:      General: There is no distension.      Palpations: Abdomen is soft. There is no mass.      Tenderness: There is no abdominal tenderness. There is no guarding or rebound.      Hernia: No hernia is present.   Genitourinary:     Comments: deferred  Musculoskeletal:         General: Normal range of motion.      Cervical back: Normal range of motion.   Neurological:      Mental Status: She is alert  and oriented to person, place, and time.   Psychiatric:         Behavior: Behavior normal.         Thought Content: Thought content normal.         Judgment: Judgment normal.       US PELVIS COMPLETE NON OB     CLINICAL HISTORY:  Postmenopausal bleeding     TECHNIQUE:  Transabdominal sonography of the pelvis was performed, followed by transvaginal sonography to better evaluate the uterus and ovaries.     COMPARISON:  None.     FINDINGS:  Uterus:     Measures 9.2 x 3.4 x 4.3 cm in dimensions.  No discrete uterine fibroids identified.  The endometrium is thickened measuring 9 mm.     Ovaries:     The ovaries are not identified.     Free Fluid:     None.     Impression:     Thickened endometrium (9 mm), possibly neoplastic.  ASSESSMENT:       ICD-10-CM ICD-9-CM    1. Postmenopausal bleeding  N95.0 627.1 Full code      Notify physician if BS > 180 for hysterectomy patients      Notify Physician/Vital Signs Parameters Urine output less than 0.5mL/kg/hr (with indwelling catheter) or 30 mL/hr (without indwelling catheter) or blood glucose greater than 200 mg/dL      Notify physician      Notify Physician - Potential Need of Opioid Reversal      Place sequential compression device      Place in Outpatient             Plan:      Ca was seen today for procedure.    Diagnoses and all orders for this visit:    Postmenopausal bleeding  To OR for hysteroscopy/D&C/possible polypectomy or mass biopsy.   Orders placed.   Verbally consented patient for the procedure and for blood transfusion if needed. Will sign consents on DOS.  Reviewed preadmission notes and surgical clearance notes. Patient held her ozempic on Saturday and held eliquis starting Sunday.  Counseling time: 15 minutes  -     Full code; Standing  -     Notify physician if BS > 180 for hysterectomy patients; Standing  -     Notify Physician/Vital Signs Parameters Urine output less than 0.5mL/kg/hr (with indwelling catheter) or 30 mL/hr (without indwelling  catheter) or blood glucose greater than 200 mg/dL; Standing  -     Notify physician; Standing  -     Notify Physician - Potential Need of Opioid Reversal; Standing  -     Place sequential compression device; Standing  -     Place in Outpatient; Standing    Other orders  -     IP VTE HIGH RISK PATIENT; Standing  -     Progressive Mobility Protocol (mobilize patient to their highest level of functioning at least twice daily); Standing        No orders of the defined types were placed in this encounter.      No follow-ups on file.      Pina Pickens

## 2024-01-08 NOTE — PATIENT INSTRUCTIONS
ANKLE: Pumps        Point toes down, then up. _30_ reps per set, _2_ sets per day.    Copyright © [employer]. All rights reserved.     Quad Sets        Tighten top of left thigh. Hold for _5_ seconds. Repeat _20_ times. Do _2-3__ times a day.      Copyright © [employer]. All rights reserved.     Straight Leg Raise        Bend one leg. Raise other leg _6_ inches with knee locked. Exhale and tighten thigh muscles while raising leg. Repeat with other leg.  Repeat _10_ times, perform  2  sets. Do _2_ times per day.     https://[employer]/269     Copyright © [employer]. All rights reserved.   Heel Slide        Bend knee and pull heel toward buttocks. Hold _5_ seconds. Return. Repeat with other knee.  Repeat _30_ times. Do _2-3_ sessions per day.     https://[employer]/371     Copyright © [employer]. All rights reserved.   Hamstring Stretch (Sitting)        Sitting, extend one leg and place hands on same thigh for support. Keeping torso straight, lean forward, sliding hands down leg, until a stretch is felt in back of thigh.  Hold _30_ seconds. Perform  3  Times. Perform  2  times a day.    Copyright © [employer]. All rights reserved.     Heel Prop        Sit with leg propped (using a large towel, books, rollers, etc), relax letting the leg straighten into extension.    *Can also assist by placing your hand just above the knee and gently pressing down toward the floor.    Copyright © 2024 [employer]    GERIATRIC - KNEE EXTENSION - LONG ARC QUAD (LAQ)        While seated with your knee in a bent position and your heel touching the ground, slowly straighten your knee as you raise your foot upwards as shown. Lower your foot back down until your heel touches the ground and then repeat.    Hold for 2-3 seconds, perform 20 repetitions     Copyright © 2024 [employer]

## 2024-01-08 NOTE — H&P (VIEW-ONLY)
History & Physical  Gynecology      SUBJECTIVE:     Chief Complaint: Procedure       History of Present Illness:  74 y/o  presents for scheduled hysteroscopy/D&C for PMB/thickened EMS. She was initially seen by me last month with report of intermittent bright red vaginal bleeding for 1-2 months. EMBx done that day obtained minimal tissue and pathology was benign. Pelvic US done noting EMS 9 mm.    Review of patient's allergies indicates:  No Known Allergies    Past Medical History:   Diagnosis Date    YOUNG (acute kidney injury) 10/15/2021    Anemia     2018    Arthritis     BMI 60.0-69.9, adult     Cataract     Chronic diastolic congestive heart failure 2021    Deep vein thrombosis     Depression     Dry eye syndrome     DVT of deep femoral vein, right 2023    Gastric adenocarcinoma 2021    GERD (gastroesophageal reflux disease)     Glaucoma suspect     History of gastric ulcer     History of psychiatric hospitalization     Hx of psychiatric care     Celexa, no recall of charted Effexor, Lexapro, Desyrel prescriptions    Hyperlipidemia     Hypertension     Hypothyroidism     Morbid obesity     Neuromuscular disorder     Sleep apnea     Thyroid disease      Past Surgical History:   Procedure Laterality Date    APPENDECTOMY      CARDIAC SURGERY      CATARACT EXTRACTION W/  INTRAOCULAR LENS IMPLANT Bilateral     MFIOL OU    CORONARY ANGIOGRAPHY Bilateral 2021    Procedure: ANGIOGRAM, CORONARY ARTERY;  Surgeon: Cresencio Ridley MD;  Location: [facility] CATH LAB;  Service: Cardiology;  Laterality: Bilateral;  Covid test needed - ok per Danay MORENOU. Pt. w/o transportation or family    ENDOSCOPIC ULTRASOUND OF UPPER GASTROINTESTINAL TRACT N/A 2021    Procedure: ULTRASOUND, UPPER GI TRACT, ENDOSCOPIC;  Surgeon: Gerald Anaya MD;  Location: [facility] ENDO ([address] [place] [address]);  Service: Endoscopy;  Laterality: N/A;  Pt unable to get a ride for swab. Will do rapid test at 8:30 - ttr    ENDOSCOPIC  ULTRASOUND OF UPPER GASTROINTESTINAL TRACT N/A 01/13/2022    Procedure: ULTRASOUND, UPPER GI TRACT, ENDOSCOPIC;  Surgeon: Kevin Carballo MD;  Location: [facility] ENDO (2ND FLR);  Service: Endoscopy;  Laterality: N/A;  blood thinner approval received, see telephone encounter 1/7/22-BB  rapid  instructions given verbally and sent to address on file in pt's chart-BB    ENDOSCOPIC ULTRASOUND OF UPPER GASTROINTESTINAL TRACT N/A 10/11/2022    Procedure: ULTRASOUND, UPPER GI TRACT, ENDOSCOPIC;  Surgeon: Dequan Ridley MD;  Location: [facility] ENDO (2ND FLR);  Service: Endoscopy;  Laterality: N/A;    ESOPHAGOGASTRODUODENOSCOPY N/A 02/05/2021    Procedure: EGD (ESOPHAGOGASTRODUODENOSCOPY);  Surgeon: Matthew Hernadez MD;  Location: [facility] ENDO (2ND FLR);  Service: Endoscopy;  Laterality: N/A;  BMI-58    Wt: 361#     COVID test at [facility] on 2/2-GT    ESOPHAGOGASTRODUODENOSCOPY N/A 03/17/2021    Procedure: EGD (ESOPHAGOGASTRODUODENOSCOPY);  Surgeon: Gerald Anaya MD;  Location: [facility] ENDO (2ND FLR);  Service: Endoscopy;  Laterality: N/A;    ESOPHAGOGASTRODUODENOSCOPY N/A 05/25/2021    Procedure: EGD (ESOPHAGOGASTRODUODENOSCOPY);  Surgeon: Kevin Carballo MD;  Location: [facility] ENDO (2ND FLR);  Service: Endoscopy;  Laterality: N/A;  ESD 2 hours  Full COVID vaccination on file. ttr    ESOPHAGOGASTRODUODENOSCOPY N/A 01/13/2022    Procedure: EGD (ESOPHAGOGASTRODUODENOSCOPY);  Surgeon: Kevin Carballo MD;  Location: [facility] ENDO (2ND FLR);  Service: Endoscopy;  Laterality: N/A;  blood thinner approval, see telephone encounter 1/7/22-BB  rapid  instructions given verbally and sent to address on file in pt's chart-BB    ESOPHAGOGASTRODUODENOSCOPY N/A 10/11/2022    Procedure: EGD (ESOPHAGOGASTRODUODENOSCOPY);  Surgeon: Dequan Ridley MD;  Location: [facility] ENDO (2ND FLR);  Service: Endoscopy;  Laterality: N/A;  She is do for EGD/EUS now. Personal history of gastric cancer. Ca Coats #[id number].   Thanks,   Kevin Carballo. MD    Pt is fully  vaccinated-DS  9/14/22-Approval to hold Eliquis rec'd from Dr. Pelaez (see telephone encounter 9/14/22)-DS  9/14/22-Ins    EYE SURGERY      INJECTION OF ANESTHETIC AGENT AROUND NERVE Left 07/10/2018    Procedure: BLOCK, NERVE;  Surgeon: Samantha Vidal MD;  Location: [facility] PAIN MGT;  Service: Pain Management;  Laterality: Left;  Genicular     INJECTION OF ANESTHETIC AGENT AROUND NERVE Left 07/19/2019    Procedure: Block, Nerve NERVE BLOCK GENICULAR WITH PHENOL 6%;  Surgeon: Samantha Vidal MD;  Location: [facility] PAIN MGT;  Service: Pain Management;  Laterality: Left;  NEEDS CONSENT    INSERTION OF TUNNELED CENTRAL VENOUS CATHETER (CVC) WITH SUBCUTANEOUS PORT N/A 07/09/2021    Procedure: INSERTION, PORT-A-CATH;  Surgeon: Mario Paz MD;  Location: [facility] CATH LAB;  Service: Radiology;  Laterality: N/A;  PORT PLACEMENT    RADIOFREQUENCY ABLATION Left 06/19/2020    Procedure: RADIOFREQUENCY ABLATION LEFT GENICULAR;  Surgeon: Tevin Clark MD;  Location: [facility] PAIN MGT;  Service: Pain Management;  Laterality: Left;  Left Genicular RFA    RADIOFREQUENCY ABLATION Left 01/22/2021    Procedure: RADIOFREQUENCY ABLATION LEFT GENICULAR;  Surgeon: Tevin Clark MD;  Location: [facility] PAIN MGT;  Service: Pain Management;  Laterality: Left;    RADIOFREQUENCY ABLATION Left 07/30/2021    Procedure: RADIOFREQUENCY ABLATION, GENICULAR COOLED NEED CONSENT;  Surgeon: Tevin Clark MD;  Location: [facility] PAIN MGT;  Service: Pain Management;  Laterality: Left;    RADIOFREQUENCY ABLATION Left 04/22/2022    Procedure: RADIOFREQUENCY ABLATION, GENICULAR COOLED , LEFT;  Surgeon: Tevin Clark MD;  Location: [facility] PAIN MGT;  Service: Pain Management;  Laterality: Left;    RADIOFREQUENCY ABLATION Left 12/23/2022    Procedure: RADIOFREQUENCY ABLATION LEFT GENICULAR COOLED CLEARED TO HOLD ELIQUIS 3 DAYS  NEEDS CONSENT;  Surgeon: Tevin Clark MD;  Location: [facility] PAIN MGT;  Service: Pain Management;  Laterality: Left;    TONSILLECTOMY        OB History          2    Para   2    Term   2            AB        Living             SAB        IAB        Ectopic        Multiple        Live Births                   Family History   Problem Relation Age of Onset    No Known Problems Mother     No Known Problems Father     Colon cancer Neg Hx     Esophageal cancer Neg Hx      Social History     Tobacco Use    Smoking status: Former     Types: Cigarettes     Start date:     Smokeless tobacco: Never    Tobacco comments:     currently smoking <5 cigarettes most days   Substance Use Topics    Alcohol use: Yes     Comment: rarely    Drug use: No       Current Outpatient Medications   Medication Sig    acetaminophen (TYLENOL) 500 MG tablet     amLODIPine (NORVASC) 10 MG tablet TAKE 1 TABLET EVERY DAY    atorvastatin (LIPITOR) 20 MG tablet TAKE 1 TABLET EVERY DAY    B-complex with vitamin C (Z-BEC OR EQUIV) tablet Take 1 tablet by mouth once daily.     CALCIUM CITRATE-VITAMIN D2 ORAL Take 1 tablet by mouth once daily.     citalopram (CELEXA) 10 MG tablet TAKE 1 TABLET EVERY DAY    dextran 70-hypromellose (TEARS) ophthalmic solution Place 1 drop into the right eye every 6 (six) hours.    ELIQUIS 5 mg Tab TAKE 1 TABLET TWICE DAILY    ferrous gluconate (FERGON) 324 MG tablet TAKE 1 TABLET (324 MG TOTAL) BY MOUTH DAILY WITH BREAKFAST.    furosemide (LASIX) 80 MG tablet TAKE 1 TABLET TWICE DAILY (Patient taking differently: Take 80 mg by mouth 2 (two) times a day.)    gabapentin (NEURONTIN) 300 MG capsule Take 2 capsules (600 mg total) by mouth 3 (three) times daily.    LIDOcaine-prilocaine (EMLA) cream Apply topically as needed (prior to port access). (Patient not taking: Reported on 2024)    lisinopriL (PRINIVIL,ZESTRIL) 40 MG tablet TAKE 1 TABLET EVERY DAY (Patient taking differently: every evening. TAKE 1 TABLET EVERY NIGHT)    multivitamin with minerals tablet Take 1 tablet by mouth once daily.     oxyCODONE-acetaminophen (PERCOCET) 5-325 mg  per tablet Take 1 tablet by mouth every 8 (eight) hours as needed for Pain.    pantoprazole (PROTONIX) 40 MG tablet TAKE 1 TABLET EVERY DAY    semaglutide (OZEMPIC) 1 mg/dose (4 mg/3 mL) INJECT 1 MG INTO THE SKIN EVERY 7 DAYS.     No current facility-administered medications for this visit.     Facility-Administered Medications Ordered in Other Visits   Medication    0.9%  NaCl infusion         Review of Systems:  Review of Systems   Constitutional:  Negative for chills, fatigue, fever and unexpected weight change.   Respiratory:  Negative for cough, shortness of breath and wheezing.    Cardiovascular:  Negative for chest pain and palpitations.   Gastrointestinal:  Negative for abdominal pain, constipation, diarrhea, nausea and vomiting.   Genitourinary:  Negative for dyspareunia, hematuria, menorrhagia, pelvic pain, vaginal bleeding, vaginal discharge and vaginal pain.   Musculoskeletal:  Negative for myalgias.   Neurological:  Negative for syncope and headaches.   Hematological:  Negative for adenopathy.   Psychiatric/Behavioral:  The patient is not nervous/anxious.         OBJECTIVE:     Physical Exam:  Physical Exam  Constitutional:       General: She is not in acute distress.     Appearance: She is well-developed.   HENT:      Head: Normocephalic and atraumatic.   Eyes:      Extraocular Movements: Extraocular movements intact.   Cardiovascular:      Rate and Rhythm: Normal rate.      Heart sounds: Normal heart sounds. No murmur heard.     No gallop.   Pulmonary:      Effort: Pulmonary effort is normal.   Abdominal:      General: There is no distension.      Palpations: Abdomen is soft. There is no mass.      Tenderness: There is no abdominal tenderness. There is no guarding or rebound.      Hernia: No hernia is present.   Genitourinary:     Comments: deferred  Musculoskeletal:         General: Normal range of motion.      Cervical back: Normal range of motion.   Neurological:      Mental Status: She is alert  and oriented to person, place, and time.   Psychiatric:         Behavior: Behavior normal.         Thought Content: Thought content normal.         Judgment: Judgment normal.       US PELVIS COMPLETE NON OB     CLINICAL HISTORY:  Postmenopausal bleeding     TECHNIQUE:  Transabdominal sonography of the pelvis was performed, followed by transvaginal sonography to better evaluate the uterus and ovaries.     COMPARISON:  None.     FINDINGS:  Uterus:     Measures 9.2 x 3.4 x 4.3 cm in dimensions.  No discrete uterine fibroids identified.  The endometrium is thickened measuring 9 mm.     Ovaries:     The ovaries are not identified.     Free Fluid:     None.     Impression:     Thickened endometrium (9 mm), possibly neoplastic.  ASSESSMENT:       ICD-10-CM ICD-9-CM    1. Postmenopausal bleeding  N95.0 627.1 Full code      Notify physician if BS > 180 for hysterectomy patients      Notify Physician/Vital Signs Parameters Urine output less than 0.5mL/kg/hr (with indwelling catheter) or 30 mL/hr (without indwelling catheter) or blood glucose greater than 200 mg/dL      Notify physician      Notify Physician - Potential Need of Opioid Reversal      Place sequential compression device      Place in Outpatient             Plan:      Ca was seen today for procedure.    Diagnoses and all orders for this visit:    Postmenopausal bleeding  To OR for hysteroscopy/D&C/possible polypectomy or mass biopsy.   Orders placed.   Verbally consented patient for the procedure and for blood transfusion if needed. Will sign consents on DOS.  Reviewed preadmission notes and surgical clearance notes. Patient held her ozempic on Saturday and held eliquis starting Sunday.  Counseling time: 15 minutes  -     Full code; Standing  -     Notify physician if BS > 180 for hysterectomy patients; Standing  -     Notify Physician/Vital Signs Parameters Urine output less than 0.5mL/kg/hr (with indwelling catheter) or 30 mL/hr (without indwelling  catheter) or blood glucose greater than 200 mg/dL; Standing  -     Notify physician; Standing  -     Notify Physician - Potential Need of Opioid Reversal; Standing  -     Place sequential compression device; Standing  -     Place in Outpatient; Standing    Other orders  -     IP VTE HIGH RISK PATIENT; Standing  -     Progressive Mobility Protocol (mobilize patient to their highest level of functioning at least twice daily); Standing        No orders of the defined types were placed in this encounter.      No follow-ups on file.      Pina Pickens

## 2024-01-09 PROBLEM — Z74.09 IMPAIRED FUNCTIONAL MOBILITY, BALANCE, GAIT, AND ENDURANCE: Status: ACTIVE | Noted: 2024-01-09

## 2024-01-09 PROBLEM — M25.662 DECREASED RANGE OF MOTION OF LEFT KNEE: Chronic | Status: ACTIVE | Noted: 2024-01-09

## 2024-01-09 PROBLEM — M62.81 MUSCLE WEAKNESS: Chronic | Status: ACTIVE | Noted: 2024-01-09

## 2024-01-09 PROBLEM — M25.662 DECREASED RANGE OF MOTION OF LEFT KNEE: Status: ACTIVE | Noted: 2024-01-09

## 2024-01-09 PROBLEM — Z74.09 IMPAIRED FUNCTIONAL MOBILITY, BALANCE, GAIT, AND ENDURANCE: Chronic | Status: ACTIVE | Noted: 2024-01-09

## 2024-01-09 PROBLEM — M62.81 MUSCLE WEAKNESS: Status: ACTIVE | Noted: 2024-01-09

## 2024-01-10 ENCOUNTER — HOSPITAL ENCOUNTER (OUTPATIENT)
Facility: OTHER | Age: 74
Discharge: HOME | End: 2024-01-10
Attending: OBSTETRICS & GYNECOLOGY | Admitting: OBSTETRICS & GYNECOLOGY
Payer: MEDICARE

## 2024-01-10 ENCOUNTER — ANESTHESIA (OUTPATIENT)
Dept: SURGERY | Facility: OTHER | Age: 74
End: 2024-01-10
Payer: MEDICARE

## 2024-01-10 DIAGNOSIS — Z98.890 STATUS POST HYSTEROSCOPY: Primary | ICD-10-CM

## 2024-01-10 DIAGNOSIS — N95.0 POSTMENOPAUSAL BLEEDING: ICD-10-CM

## 2024-01-10 PROCEDURE — 71000016 HC POSTOP RECOV ADDL HR: Mod: HCNC | Performed by: OBSTETRICS & GYNECOLOGY

## 2024-01-10 PROCEDURE — 37000009 HC ANESTHESIA EA ADD 15 MINS: Mod: HCNC | Performed by: OBSTETRICS & GYNECOLOGY

## 2024-01-10 PROCEDURE — 37000008 HC ANESTHESIA 1ST 15 MINUTES: Mod: HCNC | Performed by: OBSTETRICS & GYNECOLOGY

## 2024-01-10 PROCEDURE — D9220A PRA ANESTHESIA: Mod: HCNC,ANES,, | Performed by: ANESTHESIOLOGY

## 2024-01-10 PROCEDURE — 36000706: Mod: HCNC | Performed by: OBSTETRICS & GYNECOLOGY

## 2024-01-10 PROCEDURE — 88305 TISSUE EXAM BY PATHOLOGIST: CPT | Mod: 26,HCNC,, | Performed by: PATHOLOGY

## 2024-01-10 PROCEDURE — C1782 MORCELLATOR: HCPCS | Mod: HCNC | Performed by: OBSTETRICS & GYNECOLOGY

## 2024-01-10 PROCEDURE — 36000707: Mod: HCNC | Performed by: OBSTETRICS & GYNECOLOGY

## 2024-01-10 PROCEDURE — 25000003 PHARM REV CODE 250: Mod: HCNC | Performed by: NURSE ANESTHETIST, CERTIFIED REGISTERED

## 2024-01-10 PROCEDURE — 63600175 PHARM REV CODE 636 W HCPCS: Mod: HCNC | Performed by: ANESTHESIOLOGY

## 2024-01-10 PROCEDURE — 58558 HYSTEROSCOPY BIOPSY: CPT | Mod: ,,, | Performed by: OBSTETRICS & GYNECOLOGY

## 2024-01-10 PROCEDURE — 71000015 HC POSTOP RECOV 1ST HR: Mod: HCNC | Performed by: OBSTETRICS & GYNECOLOGY

## 2024-01-10 PROCEDURE — 88305 TISSUE EXAM BY PATHOLOGIST: CPT | Mod: 59,HCNC | Performed by: PATHOLOGY

## 2024-01-10 PROCEDURE — 27201423 OPTIME MED/SURG SUP & DEVICES STERILE SUPPLY: Mod: HCNC | Performed by: OBSTETRICS & GYNECOLOGY

## 2024-01-10 PROCEDURE — D9220A PRA ANESTHESIA: Mod: HCNC,CRNA,, | Performed by: NURSE ANESTHETIST, CERTIFIED REGISTERED

## 2024-01-10 PROCEDURE — 63600175 PHARM REV CODE 636 W HCPCS: Mod: HCNC | Performed by: NURSE ANESTHETIST, CERTIFIED REGISTERED

## 2024-01-10 PROCEDURE — 25000003 PHARM REV CODE 250: Mod: HCNC | Performed by: ANESTHESIOLOGY

## 2024-01-10 PROCEDURE — 71000033 HC RECOVERY, INTIAL HOUR: Mod: HCNC | Performed by: OBSTETRICS & GYNECOLOGY

## 2024-01-10 RX ORDER — SUCCINYLCHOLINE CHLORIDE 20 MG/ML
INJECTION INTRAMUSCULAR; INTRAVENOUS
Status: DISCONTINUED | OUTPATIENT
Start: 2024-01-10 | End: 2024-01-10

## 2024-01-10 RX ORDER — PROCHLORPERAZINE EDISYLATE 5 MG/ML
5 INJECTION INTRAMUSCULAR; INTRAVENOUS EVERY 30 MIN PRN
Status: DISCONTINUED | OUTPATIENT
Start: 2024-01-10 | End: 2024-01-10 | Stop reason: HOSPADM

## 2024-01-10 RX ORDER — ROCURONIUM BROMIDE 10 MG/ML
INJECTION, SOLUTION INTRAVENOUS
Status: DISCONTINUED | OUTPATIENT
Start: 2024-01-10 | End: 2024-01-10

## 2024-01-10 RX ORDER — FENTANYL CITRATE 50 UG/ML
INJECTION, SOLUTION INTRAMUSCULAR; INTRAVENOUS
Status: DISCONTINUED | OUTPATIENT
Start: 2024-01-10 | End: 2024-01-10

## 2024-01-10 RX ORDER — ONDANSETRON 2 MG/ML
INJECTION INTRAMUSCULAR; INTRAVENOUS
Status: DISCONTINUED | OUTPATIENT
Start: 2024-01-10 | End: 2024-01-10

## 2024-01-10 RX ORDER — PREGABALIN 75 MG/1
75 CAPSULE ORAL ONCE
Status: COMPLETED | OUTPATIENT
Start: 2024-01-10 | End: 2024-01-10

## 2024-01-10 RX ORDER — SODIUM CHLORIDE 0.9 % (FLUSH) 0.9 %
3 SYRINGE (ML) INJECTION
Status: DISCONTINUED | OUTPATIENT
Start: 2024-01-10 | End: 2024-01-10 | Stop reason: HOSPADM

## 2024-01-10 RX ORDER — LIDOCAINE HYDROCHLORIDE 20 MG/ML
INJECTION INTRAVENOUS
Status: DISCONTINUED | OUTPATIENT
Start: 2024-01-10 | End: 2024-01-10

## 2024-01-10 RX ORDER — DEXAMETHASONE SODIUM PHOSPHATE 4 MG/ML
INJECTION, SOLUTION INTRA-ARTICULAR; INTRALESIONAL; INTRAMUSCULAR; INTRAVENOUS; SOFT TISSUE
Status: DISCONTINUED | OUTPATIENT
Start: 2024-01-10 | End: 2024-01-10

## 2024-01-10 RX ORDER — KETOROLAC TROMETHAMINE 30 MG/ML
INJECTION, SOLUTION INTRAMUSCULAR; INTRAVENOUS
Status: DISCONTINUED | OUTPATIENT
Start: 2024-01-10 | End: 2024-01-10

## 2024-01-10 RX ORDER — OXYCODONE HYDROCHLORIDE 5 MG/1
5 TABLET ORAL
Status: DISCONTINUED | OUTPATIENT
Start: 2024-01-10 | End: 2024-01-10 | Stop reason: HOSPADM

## 2024-01-10 RX ORDER — SODIUM CHLORIDE, SODIUM LACTATE, POTASSIUM CHLORIDE, CALCIUM CHLORIDE 600; 310; 30; 20 MG/100ML; MG/100ML; MG/100ML; MG/100ML
INJECTION, SOLUTION INTRAVENOUS CONTINUOUS
Status: DISCONTINUED | OUTPATIENT
Start: 2024-01-10 | End: 2024-01-10 | Stop reason: HOSPADM

## 2024-01-10 RX ORDER — PROPOFOL 10 MG/ML
VIAL (ML) INTRAVENOUS
Status: DISCONTINUED | OUTPATIENT
Start: 2024-01-10 | End: 2024-01-10

## 2024-01-10 RX ORDER — DIPHENHYDRAMINE HYDROCHLORIDE 50 MG/ML
25 INJECTION, SOLUTION INTRAMUSCULAR; INTRAVENOUS EVERY 6 HOURS PRN
Status: DISCONTINUED | OUTPATIENT
Start: 2024-01-10 | End: 2024-01-10 | Stop reason: HOSPADM

## 2024-01-10 RX ORDER — ACETAMINOPHEN 500 MG
1000 TABLET ORAL
Status: COMPLETED | OUTPATIENT
Start: 2024-01-10 | End: 2024-01-10

## 2024-01-10 RX ORDER — MEPERIDINE HYDROCHLORIDE 25 MG/ML
12.5 INJECTION INTRAMUSCULAR; INTRAVENOUS; SUBCUTANEOUS ONCE AS NEEDED
Status: DISCONTINUED | OUTPATIENT
Start: 2024-01-10 | End: 2024-01-10 | Stop reason: HOSPADM

## 2024-01-10 RX ORDER — LIDOCAINE HYDROCHLORIDE 10 MG/ML
0.5 INJECTION, SOLUTION EPIDURAL; INFILTRATION; INTRACAUDAL; PERINEURAL ONCE
Status: DISCONTINUED | OUTPATIENT
Start: 2024-01-10 | End: 2024-01-10 | Stop reason: HOSPADM

## 2024-01-10 RX ORDER — HYDROMORPHONE HYDROCHLORIDE 2 MG/ML
0.4 INJECTION, SOLUTION INTRAMUSCULAR; INTRAVENOUS; SUBCUTANEOUS EVERY 5 MIN PRN
Status: DISCONTINUED | OUTPATIENT
Start: 2024-01-10 | End: 2024-01-10 | Stop reason: HOSPADM

## 2024-01-10 RX ADMIN — SODIUM CHLORIDE, SODIUM LACTATE, POTASSIUM CHLORIDE, AND CALCIUM CHLORIDE: 600; 310; 30; 20 INJECTION, SOLUTION INTRAVENOUS at 11:01

## 2024-01-10 RX ADMIN — CARBOXYMETHYLCELLULOSE SODIUM 2 DROP: 2.5 SOLUTION/ DROPS OPHTHALMIC at 11:01

## 2024-01-10 RX ADMIN — SUGAMMADEX 486 MG: 100 INJECTION, SOLUTION INTRAVENOUS at 11:01

## 2024-01-10 RX ADMIN — PROPOFOL 200 MG: 10 INJECTION, EMULSION INTRAVENOUS at 11:01

## 2024-01-10 RX ADMIN — DEXAMETHASONE SODIUM PHOSPHATE 4 MG: 4 INJECTION, SOLUTION INTRAMUSCULAR; INTRAVENOUS at 11:01

## 2024-01-10 RX ADMIN — SUCCINYLCHOLINE CHLORIDE 140 MG: 20 INJECTION, SOLUTION INTRAMUSCULAR; INTRAVENOUS at 11:01

## 2024-01-10 RX ADMIN — ROCURONIUM BROMIDE 20 MG: 10 INJECTION, SOLUTION INTRAVENOUS at 11:01

## 2024-01-10 RX ADMIN — FENTANYL CITRATE 100 MCG: 50 INJECTION, SOLUTION INTRAMUSCULAR; INTRAVENOUS at 11:01

## 2024-01-10 RX ADMIN — LIDOCAINE HYDROCHLORIDE 100 MG: 20 INJECTION, SOLUTION INTRAVENOUS at 11:01

## 2024-01-10 RX ADMIN — KETOROLAC TROMETHAMINE 30 MG: 30 INJECTION, SOLUTION INTRAMUSCULAR; INTRAVENOUS at 11:01

## 2024-01-10 RX ADMIN — ACETAMINOPHEN 1000 MG: 500 TABLET ORAL at 08:01

## 2024-01-10 RX ADMIN — PREGABALIN 75 MG: 75 CAPSULE ORAL at 08:01

## 2024-01-10 RX ADMIN — ONDANSETRON HYDROCHLORIDE 4 MG: 2 INJECTION INTRAMUSCULAR; INTRAVENOUS at 11:01

## 2024-01-10 NOTE — PATIENT CARE CONFERENCE
Spoke with pharmacy there are no prescriptions for bedside delivery. Patient picked up her prescriptions on December 27

## 2024-01-10 NOTE — ANESTHESIA POSTPROCEDURE EVALUATION
Anesthesia Post Evaluation    Patient: Ca Coats    Procedure(s) Performed: Procedure(s) (LRB):  HYSTEROSCOPY, WITH DILATION AND CURETTAGE OF UTERUS (N/A)  POLYPECTOMY, UTERUS, HYSTEROSCOPIC    Final Anesthesia Type: general      Patient location during evaluation: PACU  Patient participation: Yes- Able to Participate  Level of consciousness: awake and alert  Post-procedure vital signs: reviewed and stable  Pain management: adequate  Airway patency: patent    PONV status at discharge: No PONV  Anesthetic complications: no      Cardiovascular status: blood pressure returned to baseline  Respiratory status: unassisted and spontaneous ventilation  Hydration status: euvolemic  Follow-up not needed.              Vitals Value Taken Time   /65 01/10/24 1345   Temp 36.5 °C (97.7 °F) 01/10/24 1245   Pulse 75 01/10/24 1345   Resp 18 01/10/24 1345   SpO2 99 % 01/10/24 1345         Event Time   Out of Recovery 12:41:00         Pain/Hector Score: Pain Rating Prior to Med Admin: 0 (1/10/2024  8:58 AM)  Hector Score: 10 (1/10/2024  1:45 PM)

## 2024-01-10 NOTE — PLAN OF CARE
Ca Isabel Coats has met all discharge criteria from Phase II. Vital Signs are stable, ambulating  without difficulty. Discharge instructions given, patient verbalized understanding. Discharged from facility via wheelchair in stable condition.

## 2024-01-10 NOTE — DISCHARGE SUMMARY
[facility] - Surgery ([place])  Brief Operative Note     SUMMARY     Surgery Date: 1/10/2024     Surgeon(s) and Role:     * Michael Pickens MD - Primary     * Christy Smith MD - Resident - Assisting    Pre-op Diagnosis:  Postmenopausal bleeding [N95.0]    Post-op Diagnosis:  Post-Op Diagnosis Codes:     * Postmenopausal bleeding [N95.0]    Procedure(s) (LRB):  HYSTEROSCOPY, WITH DILATION AND CURETTAGE OF UTERUS (N/A)  POLYPECTOMY, UTERUS, HYSTEROSCOPIC    Anesthesia: Choice    Findings/Key Components:   Uncomplicated hysteroscopy. See operative note for more details.    Estimated Blood Loss: minimal    IVF: see anesthesia report    UOP: 25 cc    Complications: none         Specimens:   Specimen (24h ago, onward)       Start     Ordered    01/10/24 1144  Specimen to Pathology, Surgery Gynecology and Obstetrics  Once        Comments: Pre-op Diagnosis: Postmenopausal bleeding [N95.0]Procedure(s):HYSTEROSCOPY, WITH DILATION AND CURETTAGE OF UTERUSPOLYPECTOMY, UTERUS, HYSTEROSCOPIC Number of specimens: 2Name of specimens: 1. Endometrial polyp2. Endometrial curettage     References:    Click here for ordering Quick Tip   Question Answer Comment   Procedure Type: Gynecology and Obstetrics    Which provider would you like to cc? MICHAEL PICKENS    Release to patient Immediate        01/10/24 1144                    Discharge Note    SUMMARY     Admit Date: 1/10/2024    Discharge Date:  01/10/2024 11:40 AM    Hospital Course (synopsis of major diagnoses, care, treatment, and services provided during the course of the hospital stay): Patient was admitted for the above mentioned procedure.  Procedure was performed without difficulty.  Please see operative report for further details.  She was transferred to recovery in stable condition and discharged home the same day.      Final Diagnosis: Post-Op Diagnosis Codes:     * Postmenopausal bleeding [N95.0]    Disposition: Home or Self Care    Follow Up/Patient  Instructions: see below    Medications:  Reconciled Home Medications:      Medication List        CHANGE how you take these medications      furosemide 80 MG tablet  Commonly known as: LASIX  TAKE 1 TABLET TWICE DAILY  What changed: when to take this     lisinopriL 40 MG tablet  Commonly known as: PRINIVILZESTRIL  TAKE 1 TABLET EVERY DAY  What changed:   when to take this  additional instructions            CONTINUE taking these medications      acetaminophen 500 MG tablet  Commonly known as: TYLENOL     amLODIPine 10 MG tablet  Commonly known as: NORVASC  TAKE 1 TABLET EVERY DAY     atorvastatin 20 MG tablet  Commonly known as: LIPITOR  TAKE 1 TABLET EVERY DAY     B-complex with vitamin C tablet  Commonly known as: Z-Bec or Equiv  Take 1 tablet by mouth once daily.     CALCIUM CITRATE-VITAMIN D2 ORAL  Take 1 tablet by mouth once daily.     citalopram 10 MG tablet  Commonly known as: CeleXA  TAKE 1 TABLET EVERY DAY     dextran 70-hypromellose ophthalmic solution  Commonly known as: TEARS  Place 1 drop into the right eye every 6 (six) hours.     ELIQUIS 5 mg Tab  Generic drug: apixaban  TAKE 1 TABLET TWICE DAILY     ferrous gluconate 324 MG tablet  Commonly known as: FERGON  TAKE 1 TABLET (324 MG TOTAL) BY MOUTH DAILY WITH BREAKFAST.     gabapentin 300 MG capsule  Commonly known as: NEURONTIN  Take 2 capsules (600 mg total) by mouth 3 (three) times daily.     multivitamin with minerals tablet  Take 1 tablet by mouth once daily.     oxyCODONE-acetaminophen 5-325 mg per tablet  Commonly known as: PERCOCET  Take 1 tablet by mouth every 8 (eight) hours as needed for Pain.     OZEMPIC 1 mg/dose (4 mg/3 mL)  Generic drug: semaglutide  INJECT 1 MG INTO THE SKIN EVERY 7 DAYS.     pantoprazole 40 MG tablet  Commonly known as: PROTONIX  TAKE 1 TABLET EVERY DAY            ASK your doctor about these medications      LIDOcaine-prilocaine cream  Commonly known as: EMLA  Apply topically as needed (prior to port access).             Discharge Procedure Orders   Diet Adult Regular     No driving until:   Order Comments: No driving until not taking narcotic pain medication.     Pelvic Rest   Order Comments: Pelvic rest until 6 weeks after discharge. Nothing in vagina -no sex, tampons, douching, etc.     Notify your health care provider if you experience any of the following:  temperature >100.4     Notify your health care provider if you experience any of the following:  persistent nausea and vomiting or diarrhea     Notify your health care provider if you experience any of the following:  severe uncontrolled pain     Notify your health care provider if you experience any of the following:  redness, tenderness, or signs of infection (pain, swelling, redness, odor or green/yellow discharge around incision site)     Notify your health care provider if you experience any of the following:  difficulty breathing or increased cough     Notify your health care provider if you experience any of the following:  severe persistent headache     Notify your health care provider if you experience any of the following:  worsening rash     Notify your health care provider if you experience any of the following:  persistent dizziness, light-headedness, or visual disturbances     Notify your health care provider if you experience any of the following:  increased confusion or weakness     Notify your health care provider if you experience any of the following:   Order Comments: Heavy vaginal bleeding saturating more than 1 pad per hr for at least consecutive 2 hrs.     Activity as tolerated      Follow-up Information       Pina Pickens MD. Schedule an appointment as soon as possible for a visit in 6 week(s).    Specialty: Obstetrics and Gynecology  Why: Postoperative visit  Contact information:  [address] [facility] [address]  [phone]                             Christy Smith MD PGY1  Obstetrics and Gynecology

## 2024-01-10 NOTE — TRANSFER OF CARE
"Anesthesia Transfer of Care Note    Patient: Ca Coats    Procedure(s) Performed: Procedure(s) (LRB):  HYSTEROSCOPY, WITH DILATION AND CURETTAGE OF UTERUS (N/A)  POLYPECTOMY, UTERUS, HYSTEROSCOPIC    Patient location: PACU    Anesthesia Type: general    Transport from OR: Transported from OR on 6-10 L/min O2 by face mask with adequate spontaneous ventilation    Post pain: adequate analgesia    Post assessment: no apparent anesthetic complications    Post vital signs: stable    Level of consciousness: awake    Nausea/Vomiting: no nausea/vomiting    Complications: none    Transfer of care protocol was followed      Last vitals: Visit Vitals  BP (!) 148/72 (BP Location: Left forearm, Patient Position: Lying)   Pulse 78   Temp 36.2 °C (97.2 °F) (Skin)   Resp 16   Ht 5' 4" (1.626 m)   Wt 121.6 kg (268 lb)   SpO2 98%   Breastfeeding No   BMI 46.00 kg/m²     "

## 2024-01-10 NOTE — INTERVAL H&P NOTE
The patient has been examined and the H&P has been reviewed:    I concur with the findings and no changes have occurred since H&P was written.    Surgery risks, benefits and alternative options discussed and understood by patient/family.    Ca Coats is 73 y.o.  presenting for scheduled HysD&C/possible polypectomy or mass biopsy.    Temp:  [98 °F (36.7 °C)] 98 °F (36.7 °C)  Pulse:  [69] 69  Resp:  [16] 16  SpO2:  [97 %] 97 %  BP: (125)/(55) 125/55    General: NAD, alert, oriented, cooperative  HEENT: NCAT, EOM grossly intact  Lungs: Normal WOB  Heart: regular rate  Abdomen: soft, nondistended, nontender, no rebound or guarding    Consents signed and in chart. Pre-operative heparin not indicated. All questions answered and concerns addressed. To OR for planned procedure.      Christy Smith MD PGY1  Obstetrics and Gynecology        There are no hospital problems to display for this patient.

## 2024-01-10 NOTE — DISCHARGE INSTRUCTIONS
Home Care Instruction D&C Hysteroscopy             ACTIVITY LEVEL:  If you received sedation and/or an anesthetic, you may feel sleepy for several hours. Rest until you feel more  awake. Gradually resume your normal activities.    DIET:  At home, continue with liquids. If there is no nausea, you may eat a soft diet and gradually resume a regular diet.    BATHING:  You may shower  as desired in one day.  You should avoid tub baths, hot tubs and swimming pools until seen by your physician for a post-op follow up.    CARE:  You can expect watery or bloody vaginal discharge for several days. Do not use tampons, please only use pads. Sexual activity is restricted as advised by your doctor.    MEDICATIONS:  You will receive instructions for any pain and/or antibiotic prescriptions. Pain medication should be taken only if needed and as directed. Antibiotics, if ordered, should be taken as directed until the entire prescription is completed.    ADDITIONAL INFORMATION:  __________________________________________________________________________________________  WHEN TO CALL THE DOCTOR:   For any heavy vaginal bleeding   Fever over 101°F (38.4°C)   Any lower abdominal pain not relieved by the pain mediation  RETURN APPOINTMENT:  __________________________________________________________________________________________  FOR EMERGENCIES:  If any unusual problems or difficulties occur, contact  __________________ or the resident at [phone] (page ) or the Clinic office, [phone].

## 2024-01-10 NOTE — OP NOTE
Operative Report      Date of Surgery: 01/10/2024     Surgeon: Pina Pickens MD     Assistant: Christy Smith MD     Pre-Op Diagnosis:   Postmenopausal bleeding  Thickened endometrial stripe on ultrasound    Post-op Diagnosis:  same    Procedure:  1. Hysteroscopy  2. Dilation and Curettage  3. Polypectomy     Anesthesia: General     Findings:   - Normal appearing external genitalia  - Uterus sounded to 7 cm, dilated without difficulty to easily accommodate a 5.5 mm hysteroscope  - Hysteroscopically, bilateral tubal ostia visualized, normal uterine contour, atrophic appearing endometrium. One small polyp 1-2 cm in right posterior quadrant visualized and removed with Myosure Reach. Sent to pathology. No other abnormalities.      EBL: <5 cc     IVF: See anesthesia report.     Urine Output: 25 cc via straight in-and-out catherization    Fluid Deficit: 70 cc      Specimen:   Endometrial polyp  Endometrial curettage    Procedure in Detail:  After proper consents were explained and obtained, the patient was taken to the operating room where anesthesia was obtained without difficulty. She was then placed in dorsal lithotomy position in yellowfin stirrups. The patient was then prepped and draped in normal sterile fashion. A time out was called confirming the patient's name, date of birth, procedure to be performed, allergies, and fire risk. Right angles were used to visualize the cervix, which was grasped at the anterior lip with the single tooth tenaculum. The uterus sounded to 7 cm. The cervix was serially dilated with Hegar dilators to #14 to accommodate a 5.5mm hysteroscope.  Hysteroscopic findings included bilateral tubal ostia visualized, normal uterine contour, atrophic appearing endometrium. One small polyp visualized within uterine cavity. Polypectomy was performed with MyoSure Reach hysteroscopically and sent to pathology. A 0 curette was applied to each surface of the uterine cavity until they felt uniformly  gritty in texture in all four quadrants. The curette was removed and the scrapings were collected and sent to pathology. The tenaculum was removed from the cervix and pressure was held at the puncture sites with a sponge stick until good hemostasis was noted. The patient tolerated the procedure well. All counts were correct x2. The patient was taken to recovery area in stable condition.      Christy Smith MD PGY1  Obstetrics and Gynecology    Attestation:  I was present and scrubbed for the entire procedure.     Pina Pickens MD  OB/GYN

## 2024-01-10 NOTE — ANESTHESIA PROCEDURE NOTES
Intubation    Date/Time: 1/10/2024 11:20 AM    Performed by: Carl Lau CRNA  Authorized by: Carl Locke MD    Intubation:     Induction:  Intravenous    Intubated:  Postinduction    Mask Ventilation:  Easy with oral airway    Attempts:  1    Attempted By:  CRNA    Method of Intubation:  Video laryngoscopy    Blade:  Hernandez 3    Laryngeal View Grade: Grade I - full view of cords      Difficult Airway Encountered?: No      Complications:  None    Airway Device:  Oral endotracheal tube    Airway Device Size:  7.5    Style/Cuff Inflation:  Cuffed    Inflation Amount (mL):  4    Tube secured:  22    Secured at:  The lips    Placement Verified By:  Capnometry    Complicating Factors:  None    Findings Post-Intubation:  BS equal bilateral

## 2024-01-11 ENCOUNTER — TELEPHONE (OUTPATIENT)
Dept: OBSTETRICS AND GYNECOLOGY | Facility: CLINIC | Age: 74
End: 2024-01-11
Payer: MEDICARE

## 2024-01-11 ENCOUNTER — TELEPHONE (OUTPATIENT)
Dept: PREADMISSION TESTING | Facility: HOSPITAL | Age: 74
End: 2024-01-11
Payer: MEDICARE

## 2024-01-11 VITALS
HEIGHT: 64 IN | SYSTOLIC BLOOD PRESSURE: 139 MMHG | RESPIRATION RATE: 18 BRPM | BODY MASS INDEX: 45.75 KG/M2 | HEART RATE: 75 BPM | TEMPERATURE: 98 F | DIASTOLIC BLOOD PRESSURE: 65 MMHG | WEIGHT: 268 LBS | OXYGEN SATURATION: 99 %

## 2024-01-11 NOTE — TELEPHONE ENCOUNTER
----- Message from Johanna Pozo RN sent at 1/11/2024 12:26 PM CST -----  1/18   patient will see Dr. Stevens.  Pls schedule EKG and labs ( CMP, PT/INR, TSH).  Thank you,  Prisca

## 2024-01-11 NOTE — TELEPHONE ENCOUNTER
Called patient to check in after surgery. Patient states doing well overall, no pain, minimal spotting. Reviewed operative findings. Patient expressed understanding and all questions were answered. Will follow up again after pathology resulted. Precautions reviewed.

## 2024-01-12 ENCOUNTER — TELEPHONE (OUTPATIENT)
Dept: OBSTETRICS AND GYNECOLOGY | Facility: CLINIC | Age: 74
End: 2024-01-12
Payer: MEDICARE

## 2024-01-12 LAB
FINAL PATHOLOGIC DIAGNOSIS: NORMAL
GROSS: NORMAL
Lab: NORMAL

## 2024-01-12 NOTE — TELEPHONE ENCOUNTER
----- Message from Pina Pickens MD sent at 1/12/2024  9:45 AM CST -----  Regarding: FW:  Can you call her and let her know her pathology from surgery is benign, no precancer or cancer  ----- Message -----  From: Nikhil, [facility] Interface  Sent: 1/12/2024   9:12 AM CST  To: Pina Pickens MD    Called pt and informed her of results pt understood.

## 2024-01-17 ENCOUNTER — OFFICE VISIT (OUTPATIENT)
Dept: NEUROLOGY | Facility: CLINIC | Age: 74
End: 2024-01-17
Payer: MEDICARE

## 2024-01-17 ENCOUNTER — LAB VISIT (OUTPATIENT)
Dept: LAB | Facility: OTHER | Age: 74
End: 2024-01-17
Attending: STUDENT IN AN ORGANIZED HEALTH CARE EDUCATION/TRAINING PROGRAM
Payer: MEDICARE

## 2024-01-17 VITALS — BODY MASS INDEX: 45.75 KG/M2 | WEIGHT: 268 LBS | HEIGHT: 64 IN

## 2024-01-17 DIAGNOSIS — R42 DIZZINESS AND GIDDINESS: ICD-10-CM

## 2024-01-17 DIAGNOSIS — R26.89 IMPAIRED GAIT AND MOBILITY: ICD-10-CM

## 2024-01-17 DIAGNOSIS — G60.9 HEREDITARY AND IDIOPATHIC NEUROPATHY, UNSPECIFIED: ICD-10-CM

## 2024-01-17 DIAGNOSIS — E08.00 DIABETES MELLITUS DUE TO UNDERLYING CONDITION WITH HYPEROSMOLARITY WITHOUT COMA, WITHOUT LONG-TERM CURRENT USE OF INSULIN: ICD-10-CM

## 2024-01-17 DIAGNOSIS — R42 DIZZINESS: ICD-10-CM

## 2024-01-17 DIAGNOSIS — R20.8 VIBRATION SENSORY LOSS: ICD-10-CM

## 2024-01-17 DIAGNOSIS — Z01.818 PREOP TESTING: Primary | ICD-10-CM

## 2024-01-17 DIAGNOSIS — R42 DIZZINESS AND GIDDINESS: Primary | ICD-10-CM

## 2024-01-17 LAB — VIT B12 SERPL-MCNC: >2000 PG/ML (ref 210–950)

## 2024-01-17 PROCEDURE — 82607 VITAMIN B-12: CPT | Mod: HCNC | Performed by: STUDENT IN AN ORGANIZED HEALTH CARE EDUCATION/TRAINING PROGRAM

## 2024-01-17 PROCEDURE — 86334 IMMUNOFIX E-PHORESIS SERUM: CPT | Mod: 26,HCNC,, | Performed by: PATHOLOGY

## 2024-01-17 PROCEDURE — 3288F FALL RISK ASSESSMENT DOCD: CPT | Mod: HCNC,CPTII,S$GLB, | Performed by: STUDENT IN AN ORGANIZED HEALTH CARE EDUCATION/TRAINING PROGRAM

## 2024-01-17 PROCEDURE — 82300 ASSAY OF CADMIUM: CPT | Mod: HCNC | Performed by: STUDENT IN AN ORGANIZED HEALTH CARE EDUCATION/TRAINING PROGRAM

## 2024-01-17 PROCEDURE — 84207 ASSAY OF VITAMIN B-6: CPT | Mod: HCNC | Performed by: STUDENT IN AN ORGANIZED HEALTH CARE EDUCATION/TRAINING PROGRAM

## 2024-01-17 PROCEDURE — 99204 OFFICE O/P NEW MOD 45 MIN: CPT | Mod: HCNC,S$GLB,, | Performed by: STUDENT IN AN ORGANIZED HEALTH CARE EDUCATION/TRAINING PROGRAM

## 2024-01-17 PROCEDURE — 86255 FLUORESCENT ANTIBODY SCREEN: CPT | Mod: 59,HCNC | Performed by: STUDENT IN AN ORGANIZED HEALTH CARE EDUCATION/TRAINING PROGRAM

## 2024-01-17 PROCEDURE — 82525 ASSAY OF COPPER: CPT | Mod: HCNC | Performed by: STUDENT IN AN ORGANIZED HEALTH CARE EDUCATION/TRAINING PROGRAM

## 2024-01-17 PROCEDURE — 99999 PR PBB SHADOW E&M-EST. PATIENT-LVL IV: CPT | Mod: PBBFAC,HCNC,, | Performed by: STUDENT IN AN ORGANIZED HEALTH CARE EDUCATION/TRAINING PROGRAM

## 2024-01-17 PROCEDURE — 84446 ASSAY OF VITAMIN E: CPT | Mod: HCNC | Performed by: STUDENT IN AN ORGANIZED HEALTH CARE EDUCATION/TRAINING PROGRAM

## 2024-01-17 PROCEDURE — 1159F MED LIST DOCD IN RCRD: CPT | Mod: HCNC,CPTII,S$GLB, | Performed by: STUDENT IN AN ORGANIZED HEALTH CARE EDUCATION/TRAINING PROGRAM

## 2024-01-17 PROCEDURE — 3008F BODY MASS INDEX DOCD: CPT | Mod: HCNC,CPTII,S$GLB, | Performed by: STUDENT IN AN ORGANIZED HEALTH CARE EDUCATION/TRAINING PROGRAM

## 2024-01-17 PROCEDURE — 86596 VOLTAGE-GTD CA CHNL ANTB EA: CPT | Mod: HCNC | Performed by: STUDENT IN AN ORGANIZED HEALTH CARE EDUCATION/TRAINING PROGRAM

## 2024-01-17 PROCEDURE — 84630 ASSAY OF ZINC: CPT | Mod: HCNC | Performed by: STUDENT IN AN ORGANIZED HEALTH CARE EDUCATION/TRAINING PROGRAM

## 2024-01-17 PROCEDURE — 84165 PROTEIN E-PHORESIS SERUM: CPT | Mod: 26,HCNC,, | Performed by: PATHOLOGY

## 2024-01-17 PROCEDURE — 4010F ACE/ARB THERAPY RXD/TAKEN: CPT | Mod: HCNC,CPTII,S$GLB, | Performed by: STUDENT IN AN ORGANIZED HEALTH CARE EDUCATION/TRAINING PROGRAM

## 2024-01-17 PROCEDURE — 1101F PT FALLS ASSESS-DOCD LE1/YR: CPT | Mod: HCNC,CPTII,S$GLB, | Performed by: STUDENT IN AN ORGANIZED HEALTH CARE EDUCATION/TRAINING PROGRAM

## 2024-01-17 PROCEDURE — 86334 IMMUNOFIX E-PHORESIS SERUM: CPT | Mod: HCNC | Performed by: STUDENT IN AN ORGANIZED HEALTH CARE EDUCATION/TRAINING PROGRAM

## 2024-01-17 PROCEDURE — 36415 COLL VENOUS BLD VENIPUNCTURE: CPT | Mod: HCNC | Performed by: STUDENT IN AN ORGANIZED HEALTH CARE EDUCATION/TRAINING PROGRAM

## 2024-01-17 PROCEDURE — 84165 PROTEIN E-PHORESIS SERUM: CPT | Mod: HCNC | Performed by: STUDENT IN AN ORGANIZED HEALTH CARE EDUCATION/TRAINING PROGRAM

## 2024-01-17 NOTE — PROGRESS NOTES
"        Patient ID: [id number]  Referring Physician: Cathy Pelaez MD    Chief Complaint/Reason for Consult: dizziness     Subjective:     HPI  Ca Coats is a pleasant 73 y.o. RH female with HTN, LAURA (on CPAP), CHF, obesity, current smoker, and Hx of gastric cancer s/p chemoradiation and DVT (on eliquis). she is presenting today for evaluation of dizziness.    She has been suffering episodes of vertigo multiple times daily for the last few months, associated with SOB, palpitation, nausea, headache, and feeling faint. She describes it as "room spinning" and denies chest pain or LOC. Episodes normally come one when bending over or standing up from a sitting position but it may occur when sitting as well. They almost never occur when lying down in bed. Dizziness resolves with sitting still for 10-15 minutes. She denies any head trauma or doesn't have any knowledge of recent strokes. She has a Hx of DVT for which she is on eliquis, unclear if this was incited by the hypercoagulable state secondary  to the cancer. Recent pan CT and PET Scan confirmed remission. Upon questioning she reports tingling in all extremities but denies burning pain. She received 5-FU for chemo and I can not find any records if she received platinum-based agents.  She lives alone and walks with a walker. She denies any previous history of bariatric surgery.    Review of Systems   Constitutional:  Negative for unexpected weight change.   HENT:  Negative for trouble swallowing and voice change.    Eyes:  Negative for photophobia and visual disturbance.   Respiratory:  Positive for shortness of breath.    Cardiovascular:  Positive for palpitations. Negative for chest pain.   Gastrointestinal:  Negative for fecal incontinence.   Genitourinary:  Negative for bladder incontinence.   Musculoskeletal:  Positive for gait problem. Negative for leg pain.   Neurological:  Positive for dizziness, vertigo and headaches. Negative for speech " difficulty, weakness and numbness.   Psychiatric/Behavioral:  Negative for behavioral problems and confusion.    All other systems reviewed and are negative.    Past Medical History:  Active Ambulatory Problems     Diagnosis Date Noted    Primary osteoarthritis of left knee 07/10/2018    Obstructive sleep apnea 08/09/2018    Chronic knee pain 11/30/2018    Left knee DJD 12/21/2018    Essential hypertension 04/29/2019    Major depressive disorder 04/29/2019    BMI 40.0-44.9, adult 04/29/2019    Joint pain 04/29/2019    Gait instability 07/15/2019    At high risk for injury related to fall     Debility     Hypothyroidism 07/18/2019    Left knee pain 07/19/2019    Osteopenia of neck of left femur 01/14/2020    Gastroesophageal reflux disease 03/22/2020    Chronic diastolic congestive heart failure 01/27/2021    Pure hypercholesterolemia 01/27/2021    Tobacco abuse 01/28/2021    Abnormal cardiovascular stress test 02/24/2021    Gastric adenocarcinoma 05/25/2021    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    Chronic pain 07/30/2021    History of DVT (deep vein thrombosis) 10/15/2021    Current mild episode of major depressive disorder without prior episode 03/07/2022    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/2023    DVT of deep femoral vein, right 05/29/2023    Atherosclerosis of aorta 10/31/2023    Nervous 01/05/2024    Anemia 01/05/2024    Edema 01/05/2024    Postural dizziness 01/05/2024    Constipation 01/05/2024    History of Clostridium difficile infection 01/05/2024    Opioid use 01/05/2024    Impaired functional mobility, balance, gait, and endurance 01/09/2024    Decreased range of motion of left knee 01/09/2024    Muscle weakness 01/09/2024    s/p hscope/D&C/polypectomy 01/10/2024     Allergies:  Review of patient's allergies indicates:  No Known Allergies    Pertinent Family History:  Family History   Problem Relation Age of Onset    No Known Problems Mother      No Known Problems Father     Colon cancer Neg Hx     Esophageal cancer Neg Hx        Pertinent Social History:  Social History     Tobacco Use    Smoking status: Former     Types: Cigarettes     Start date: 2018    Smokeless tobacco: Never    Tobacco comments:     currently smoking <5 cigarettes most days   Substance Use Topics    Alcohol use: Yes     Comment: rarely    Drug use: No       Medications:  Current Outpatient Medications   Medication Instructions    acetaminophen (TYLENOL) 500 MG tablet No dose, route, or frequency recorded.    amLODIPine (NORVASC) 10 MG tablet TAKE 1 TABLET EVERY DAY    atorvastatin (LIPITOR) 20 MG tablet TAKE 1 TABLET EVERY DAY    B-complex with vitamin C (Z-BEC OR EQUIV) tablet 1 tablet, Oral, Daily    CALCIUM CITRATE-VITAMIN D2 ORAL 1 tablet, Oral, Daily    citalopram (CELEXA) 10 MG tablet TAKE 1 TABLET EVERY DAY    dextran 70-hypromellose (TEARS) ophthalmic solution 1 drop, Right Eye, Every 6 hours    ELIQUIS 5 mg Tab TAKE 1 TABLET TWICE DAILY    ferrous gluconate (FERGON) 324 mg, Oral, With breakfast    furosemide (LASIX) 80 MG tablet TAKE 1 TABLET TWICE DAILY    gabapentin (NEURONTIN) 600 mg, Oral, 3 times daily    LIDOcaine-prilocaine (EMLA) cream Topical (Top), As needed (PRN)    lisinopriL (PRINIVIL,ZESTRIL) 40 MG tablet TAKE 1 TABLET EVERY DAY    multivitamin with minerals tablet 1 tablet, Oral, Daily    oxyCODONE-acetaminophen (PERCOCET) 5-325 mg per tablet 1 tablet, Oral, Every 8 hours PRN    pantoprazole (PROTONIX) 40 MG tablet TAKE 1 TABLET EVERY DAY    semaglutide (OZEMPIC) 1 mg/dose (4 mg/3 mL) INJECT 1 MG INTO THE SKIN EVERY 7 DAYS.        Objective:     There were no vitals filed for this visit.     General:  Well-appearing, well-nourished, NAD, cooperative    Neurologic Exam:   Awake, alert and oriented x3  Speech spontaneous and fluent, intact comprehension.   Adequate fund of knowledge, vocabulary.    CN II - CN XII:  PERRLA. EOM intact. No Nystagmus. No  ophthalmoplegia.   Facial sensation is normal to light touch.   Facial expression is full and symmetric.   Hearing is intact bilaterally.   Palate elevates symmetrically.   SCM and Trapezius full strength bilaterally.   Tongue is midline.     Motor:  Normal bulk and tone in all four limbs.   There are no atrophy or fasciculations. No tremor.     Shoulder  Abd Shoulder Add Elbow   Flex Elbow  Ext Wrist   Flex Wrist  Ext Finger  Flex Finger  Ext Finger  Abd Finger   Add IO Opposition   Right 5 5 5 5       5    Left 5 5 5 5       5       Hip  Flex Hip  Ext Thigh   Abd Thigh  Add Knee  Flex Knee  Ext Plantar  Flex Dorsiflex   Right 5 5   5 5 5 5   Left 4 5   5 5 5 5     Sensory:  Light touch: normal tactile sense throughout  Temperature: reduced throughout distally x4  Vibration: vibratory sense reduced throughout (up to the knees and elbows)  Proprioception: proprioceptive sense present on RUE and on LUE  Romberg was deferred.    DTRs:   Biceps Brachioradialis Triceps Jessi Patellar Ankle Plantar   Right 2+ 2+  - 2+ 2+    Left 2+ 2+  - 2+ 2+      Coordination:  Finger to nose is normal bilaterally.  slowed fine finger movements and rapid alternating movements.    Gait:  Deferred, she doesn't have her walker and was transported with a wheelchair.      Pertinent lab results  Lab Results   Component Value Date    FOLATE 17.6 05/13/2019    VVHLRDPF29 >2000 (H) 04/05/2023    THIAMINEBLOO 66 05/13/2019    [id number] 56 06/30/2021    LACTATE 1.7 10/14/2021     Lab Results   Component Value Date    METHLYMALONI 0.21 04/05/2023     Lab Results   Component Value Date    TSH 0.442 10/26/2021    FREET4 0.93 07/19/2019    WBC 7.22 01/04/2024    LYMPH 1.8 01/04/2024    LYMPH 25.5 01/04/2024    RBC 3.59 (L) 01/04/2024    HGB 11.5 (L) 01/04/2024    HCT 36.8 (L) 01/04/2024     (H) 01/04/2024     01/04/2024     01/04/2024    K 4.6 01/04/2024    CO2 27 01/04/2024    BUN 14 01/04/2024    CREATININE 0.8 01/04/2024     CALCIUM 8.9 01/04/2024    AST 14 10/27/2023    ALT 9 (L) 10/27/2023     Pertinent imaging results  *No relevant imaging available to review     Other pertinent studies  None    Assessment:   Ca Coats is a 73 y.o. female with multiple medical comorbidities including HTN, LAURA (on CPAP), CHF, obesity, current smoker, and Hx of gastric cancer s/p chemoradiation and DVT (on eliquis) who presents for evaluation of episodic vertigo. Episodes have some cardiovascular features overlapping with vertiginous component. Considering her vascular risk factors and recent cancer with subsequent hypercoagulable state, possibility of an infract within the posterior circulation and/or atherosclerosis of posterior circulation leading to vertebrobasilar insufficiency must be excluded. Additionally, regarding her balance, exam is notable for significant loss of vibratory sensation in all extremities, we will complete a serum work up to investigate metabolic, toxic, and paraneoplastic etiologies. If above work up is unrevealing, we may consider EMG-NCS.    1. Dizziness and giddiness    2. Dizziness    3. Impaired gait and mobility    4. Vibration sensory loss    5. Hereditary and idiopathic neuropathy, unspecified       Plan:     - Copper, Serum; Future  - Zinc; Future  - Vitamin B6; Future  - Vitamin B12; Future  - Vitamin E; Future  - Protein Electrophoresis, Serum; Future  - Immunofixation Electrophoresis; Future  - Paraneoplastic Autoantibody Evaulation, Serum; Future  - Heavy Metals Screen, Blood (Quantitative); Future  - MRI Brain Without Contrast; Future  - MRA Brain without contrast; Future  - MRA Neck without contrast; Future  - Vitamin B6; Future  - Follow up: in 6-8 weeks with results    Plan was discussed in detail with the patient, who is in agreement.    Time spent on this encounter: 47 minutes. This includes face to face time (obtaining history, documenting clinical information in the EMR, physical exam,  discussing the plan with patient) and non-face to face time (such as preparing to see the patient (ie. Chart review, reviewing and interpreting previous labs and imaging), further EMR documentation, ordering tests, independently interpreting results and communicating results to the patient/family/caregiver, or care coordinator).     Disclaimer: This note was partly generated using dictation software which may occasionally result in transcription errors that are missed on review.        Mary Staton MD    [facility]  01/17/2024

## 2024-01-17 NOTE — DISCHARGE INSTRUCTIONS
Your surgery has been scheduled for:___________2/7/2024_______________________________    You should report to:  ____[facility], located on the [place] side of the first floor of the           [facility] ([phone])  _x___[facility], located on the [facility] side of the            Second floor of the [facility] ([phone])  ____3rd Floor [facility] located on the [facility] side of the [facility] [phone]  ____[facility]: located at [address] [place] [place] [address]. [address].             Please Note   Tell your doctor if you take Aspirin, products containing Aspirin, herbal medications  or blood thinners, such as Coumadin, Ticlid, or Plavix.  (Consult your provider regarding holding or stopping before surgery).  Arrange for someone to drive you home following surgery.  You will not be allowed to leave the surgical facility alone or drive yourself home following sedation and anesthesia.            Before Surgery  Stop taking all herbal medications, vitamins, and supplements 7 days prior to surgery  No Motrin/Advil (Ibuprofen) 7 days before surgery  No Aleve (Naproxen) 7 days before surgery  Stop Taking Asprin, products containing Asprin __7___days before surgery   No Goody's/BC  Powder 7 days before surgery  Stop taking blood thinners____3___days before surgery: hold Eliquis x 3 d prior to surgery, last day will take is 2/3.  Last dose of Ozempic take on or prior to January 30th.  Refrain from drinking alcoholic beverages for 24hours before and after surgery  Stop or limit smoking ____7_____days before surgery  You may take Tylenol for pain    Night before Surgery  Do not eat or drink after midnight  Take a shower or bath (shower is recommended).  Bathe with Hibiclens soap or an antibacterial soap from the neck down.  If not supplied by your surgeon, hibiclens soap will need  to be purchased over the counter in pharmacy.  Rinse soap off thoroughly.  Shampoo your hair with your regular shampoo    The Day of Surgery  Take another bath or shower with hibiclens or any antibacterial soap, to reduce the chance of infection.  Take heart and blood pressure medications with a small sip of water, as advised by the perioperative team.  Do not take fluid pills  You may brush your teeth and rinse your mouth, but do not swall any additional water.   Do not apply perfumes, powder, body lotions or deodorant on the day of surgery.  Nail polish should be removed.  Do not wear makeup or moisturizer  Wear comfortable clothes, such as a button front shirt and loose fitting pants.  Leave all jewelry, including body piercings, and valuables at home.    Bring any devices you will neeed after surgery such as crutches or canes.  If you have sleep apnea, please bring your CPAP machine  In the event that your physical condition changes including the onset of a cold or respiratory illness, or if you have to delay or cancel your surgery, please notify your surgeon.         Anesthesia: Regional Anesthesia    Youre scheduled for surgery. During surgery, youll receive medicine called anesthesia to keep you comfortable and pain-free. Your surgeon has decided that youll receive regional anesthesia. This sheet tells you what to expect with this type of anesthesia.  What is regional anesthesia?  Regional anesthesia numbs one region of your body. The anesthesia may be given around nerves or into veins in your arms, neck, or legs (nerve block or [place] block). Or it may be sent into the spinal fluid (spinal anesthesia) or into the space just outside the spinal fluid (epidural anesthesia). You may also be given sedatives to help you relax.  Nerve block or [place] block  A small area of the body, such as an arm or leg, can be numbed using a nerve block or Aldo block.  Nerve block. During a nerve block, your skin is numbed. A  needle is then inserted near nerves that serve the area to be numbed. Anesthetic is sent through the needle.  IV regional or Aldo block. For this type of block, an IV line is put into a vein. The blood flow to the area to be numbed is blocked for a short time. Anesthetic is sent through the IV.  Spinal anesthesia  Spinal anesthesia numbs your body from about the waist down.  Anesthetic is injected into the spinal fluid. This is a substance that surrounds the spinal cord in your spinal column. The anesthetic blocks pain traveling from the body to the brain.  To receive the anesthetic, your skin is numbed at the injection site on your back.  A needle is then inserted into the spinal space. Anesthetic is sent into the spinal fluid through the needle.  Epidural anesthesia  Epidural anesthesia is most commonly used during childbirth and may also be used after surgical procedures of the chest, belly, and legs.  Anesthetic is injected into the epidural space. This is just outside the dural sac which contains the spinal fluid.  To receive the anesthetic, your skin is numbed at the injection site on your back.  A needle is then inserted into the epidural space. Anesthetic is sent into the epidural space through the needle.  A small flexible catheter may be attached to the needle and left in place. This allows for continuous injections or infusions of anesthetic.  Anesthesia tools and medicines that might be near you during your procedure  Local anesthetic. This medicine is given through a needle numbs one region of your body.  Electrocardiography leads (electrodes). These are used to record your heart rate and rhythm.  Blood pressure cuff. A cuff is placed on your arm to keep track of your blood pressure.  Pulse oximeter. This small clip is placed on the end of the finger. It measures your blood oxygen level.  Sedatives. These medicines may be given through an IV. They help to relax you and keep you comfortable. You may stay  awake or sleep lightly.  Oxygen. You may be given oxygen through a facemask.  Risks and possible complications  Regional anesthesia carries some risks. These include:  Nausea and vomiting  Headache  Backache  Decreased blood pressure  Allergic reaction to the anesthetic  Ongoing numbness (rare)  Irregular heartbeat (rare)  Cardiac arrest (rare)   Date Last Reviewed: 12/1/2016  © 5630-6596 [employer]. [address] [place] [address]. All rights reserved. This information is not intended as a substitute for professional medical care. Always follow your healthcare professional's instructions.

## 2024-01-18 ENCOUNTER — HOSPITAL ENCOUNTER (OUTPATIENT)
Dept: RADIOLOGY | Facility: HOSPITAL | Age: 74
Discharge: HOME | End: 2024-01-18
Payer: MEDICARE

## 2024-01-18 ENCOUNTER — OFFICE VISIT (OUTPATIENT)
Dept: INTERNAL MEDICINE | Facility: CLINIC | Age: 74
End: 2024-01-18
Payer: MEDICARE

## 2024-01-18 ENCOUNTER — HOSPITAL ENCOUNTER (OUTPATIENT)
Dept: PREADMISSION TESTING | Facility: HOSPITAL | Age: 74
Discharge: HOME | End: 2024-01-18
Attending: ANESTHESIOLOGY
Payer: MEDICARE

## 2024-01-18 ENCOUNTER — HOSPITAL ENCOUNTER (OUTPATIENT)
Dept: CARDIOLOGY | Facility: CLINIC | Age: 74
Discharge: HOME | End: 2024-01-18
Payer: MEDICARE

## 2024-01-18 ENCOUNTER — OFFICE VISIT (OUTPATIENT)
Dept: ORTHOPEDICS | Facility: CLINIC | Age: 74
End: 2024-01-18
Payer: MEDICARE

## 2024-01-18 VITALS — HEIGHT: 66 IN | WEIGHT: 261 LBS | BODY MASS INDEX: 41.95 KG/M2

## 2024-01-18 VITALS
BODY MASS INDEX: 41.95 KG/M2 | HEIGHT: 66 IN | WEIGHT: 261 LBS | HEART RATE: 75 BPM | DIASTOLIC BLOOD PRESSURE: 58 MMHG | OXYGEN SATURATION: 96 % | SYSTOLIC BLOOD PRESSURE: 114 MMHG | RESPIRATION RATE: 16 BRPM

## 2024-01-18 DIAGNOSIS — G62.9 NEUROPATHY: ICD-10-CM

## 2024-01-18 DIAGNOSIS — E03.9 HYPOTHYROIDISM, UNSPECIFIED TYPE: ICD-10-CM

## 2024-01-18 DIAGNOSIS — I10 ESSENTIAL HYPERTENSION: ICD-10-CM

## 2024-01-18 DIAGNOSIS — E78.00 PURE HYPERCHOLESTEROLEMIA: Chronic | ICD-10-CM

## 2024-01-18 DIAGNOSIS — Z96.652 S/P TOTAL KNEE REPLACEMENT, LEFT: ICD-10-CM

## 2024-01-18 DIAGNOSIS — C16.9 GASTRIC ADENOCARCINOMA: ICD-10-CM

## 2024-01-18 DIAGNOSIS — Z86.19 HISTORY OF CLOSTRIDIUM DIFFICILE INFECTION: ICD-10-CM

## 2024-01-18 DIAGNOSIS — Z01.818 PRE-OP TESTING: ICD-10-CM

## 2024-01-18 DIAGNOSIS — Z86.718 HISTORY OF DVT (DEEP VEIN THROMBOSIS): Chronic | ICD-10-CM

## 2024-01-18 DIAGNOSIS — M17.12 PRIMARY OSTEOARTHRITIS OF LEFT KNEE: Primary | ICD-10-CM

## 2024-01-18 DIAGNOSIS — Z72.0 TOBACCO ABUSE: Chronic | ICD-10-CM

## 2024-01-18 DIAGNOSIS — R60.9 EDEMA, UNSPECIFIED TYPE: ICD-10-CM

## 2024-01-18 DIAGNOSIS — G47.33 OBSTRUCTIVE SLEEP APNEA: ICD-10-CM

## 2024-01-18 DIAGNOSIS — K59.00 CONSTIPATION, UNSPECIFIED CONSTIPATION TYPE: ICD-10-CM

## 2024-01-18 DIAGNOSIS — R42 DIZZINESS: ICD-10-CM

## 2024-01-18 DIAGNOSIS — D64.9 ANEMIA, UNSPECIFIED TYPE: ICD-10-CM

## 2024-01-18 DIAGNOSIS — I50.32 CHRONIC DIASTOLIC CONGESTIVE HEART FAILURE: Chronic | ICD-10-CM

## 2024-01-18 DIAGNOSIS — R45.0 NERVOUS: ICD-10-CM

## 2024-01-18 DIAGNOSIS — M17.12 PRIMARY OSTEOARTHRITIS OF LEFT KNEE: ICD-10-CM

## 2024-01-18 DIAGNOSIS — F11.90 OPIOID USE: ICD-10-CM

## 2024-01-18 DIAGNOSIS — R94.39 ABNORMAL CARDIOVASCULAR STRESS TEST: ICD-10-CM

## 2024-01-18 DIAGNOSIS — G89.29 OTHER CHRONIC PAIN: Primary | ICD-10-CM

## 2024-01-18 DIAGNOSIS — K21.9 GASTROESOPHAGEAL REFLUX DISEASE, UNSPECIFIED WHETHER ESOPHAGITIS PRESENT: ICD-10-CM

## 2024-01-18 DIAGNOSIS — Z98.890 STATUS POST HYSTEROSCOPY: ICD-10-CM

## 2024-01-18 LAB
ALBUMIN SERPL ELPH-MCNC: 3.2 G/DL (ref 3.35–5.55)
ALPHA1 GLOB SERPL ELPH-MCNC: 0.32 G/DL (ref 0.17–0.41)
ALPHA2 GLOB SERPL ELPH-MCNC: 0.94 G/DL (ref 0.43–0.99)
B-GLOBULIN SERPL ELPH-MCNC: 0.78 G/DL (ref 0.5–1.1)
GAMMA GLOB SERPL ELPH-MCNC: 0.96 G/DL (ref 0.67–1.58)
INTERPRETATION SERPL IFE-IMP: NORMAL
PROT SERPL-MCNC: 6.2 G/DL (ref 6–8.4)

## 2024-01-18 PROCEDURE — 73560 X-RAY EXAM OF KNEE 1 OR 2: CPT | Mod: TC,HCNC,LT

## 2024-01-18 PROCEDURE — 99215 OFFICE O/P EST HI 40 MIN: CPT | Mod: HCNC,S$GLB,, | Performed by: HOSPITALIST

## 2024-01-18 PROCEDURE — 99214 OFFICE O/P EST MOD 30 MIN: CPT | Mod: HCNC,S$GLB,,

## 2024-01-18 PROCEDURE — 1160F RVW MEDS BY RX/DR IN RCRD: CPT | Mod: HCNC,CPTII,S$GLB,

## 2024-01-18 PROCEDURE — 3288F FALL RISK ASSESSMENT DOCD: CPT | Mod: HCNC,CPTII,S$GLB,

## 2024-01-18 PROCEDURE — 1125F AMNT PAIN NOTED PAIN PRSNT: CPT | Mod: HCNC,CPTII,S$GLB,

## 2024-01-18 PROCEDURE — 73560 X-RAY EXAM OF KNEE 1 OR 2: CPT | Mod: 26,HCNC,LT, | Performed by: RADIOLOGY

## 2024-01-18 PROCEDURE — 93005 ELECTROCARDIOGRAM TRACING: CPT | Mod: HCNC,S$GLB,, | Performed by: ANESTHESIOLOGY

## 2024-01-18 PROCEDURE — 1159F MED LIST DOCD IN RCRD: CPT | Mod: HCNC,CPTII,S$GLB,

## 2024-01-18 PROCEDURE — 3074F SYST BP LT 130 MM HG: CPT | Mod: HCNC,CPTII,S$GLB, | Performed by: HOSPITALIST

## 2024-01-18 PROCEDURE — 4010F ACE/ARB THERAPY RXD/TAKEN: CPT | Mod: HCNC,CPTII,S$GLB, | Performed by: HOSPITALIST

## 2024-01-18 PROCEDURE — 3008F BODY MASS INDEX DOCD: CPT | Mod: HCNC,CPTII,S$GLB,

## 2024-01-18 PROCEDURE — 3008F BODY MASS INDEX DOCD: CPT | Mod: HCNC,CPTII,S$GLB, | Performed by: HOSPITALIST

## 2024-01-18 PROCEDURE — 1159F MED LIST DOCD IN RCRD: CPT | Mod: HCNC,CPTII,S$GLB, | Performed by: HOSPITALIST

## 2024-01-18 PROCEDURE — 3044F HG A1C LEVEL LT 7.0%: CPT | Mod: HCNC,CPTII,S$GLB,

## 2024-01-18 PROCEDURE — 3078F DIAST BP <80 MM HG: CPT | Mod: HCNC,CPTII,S$GLB, | Performed by: HOSPITALIST

## 2024-01-18 PROCEDURE — 1101F PT FALLS ASSESS-DOCD LE1/YR: CPT | Mod: HCNC,CPTII,S$GLB,

## 2024-01-18 PROCEDURE — 4010F ACE/ARB THERAPY RXD/TAKEN: CPT | Mod: HCNC,CPTII,S$GLB,

## 2024-01-18 PROCEDURE — 1101F PT FALLS ASSESS-DOCD LE1/YR: CPT | Mod: HCNC,CPTII,S$GLB, | Performed by: HOSPITALIST

## 2024-01-18 PROCEDURE — 99999 PR PBB SHADOW E&M-EST. PATIENT-LVL III: CPT | Mod: PBBFAC,HCNC,,

## 2024-01-18 PROCEDURE — 3288F FALL RISK ASSESSMENT DOCD: CPT | Mod: HCNC,CPTII,S$GLB, | Performed by: HOSPITALIST

## 2024-01-18 PROCEDURE — 93010 ELECTROCARDIOGRAM REPORT: CPT | Mod: HCNC,S$GLB,, | Performed by: INTERNAL MEDICINE

## 2024-01-18 PROCEDURE — 99999 PR PBB SHADOW E&M-EST. PATIENT-LVL IV: CPT | Mod: PBBFAC,HCNC,, | Performed by: HOSPITALIST

## 2024-01-18 RX ORDER — FENTANYL CITRATE 50 UG/ML
25 INJECTION, SOLUTION INTRAMUSCULAR; INTRAVENOUS EVERY 5 MIN PRN
Status: CANCELLED | OUTPATIENT
Start: 2024-01-18

## 2024-01-18 RX ORDER — PROCHLORPERAZINE EDISYLATE 5 MG/ML
5 INJECTION INTRAMUSCULAR; INTRAVENOUS EVERY 6 HOURS PRN
Status: CANCELLED | OUTPATIENT
Start: 2024-01-18

## 2024-01-18 RX ORDER — ONDANSETRON HYDROCHLORIDE 2 MG/ML
4 INJECTION, SOLUTION INTRAVENOUS EVERY 8 HOURS PRN
Status: CANCELLED | OUTPATIENT
Start: 2024-01-18

## 2024-01-18 RX ORDER — MUPIROCIN 20 MG/G
1 OINTMENT TOPICAL
Status: CANCELLED | OUTPATIENT
Start: 2024-01-18

## 2024-01-18 RX ORDER — SODIUM CHLORIDE 9 MG/ML
INJECTION, SOLUTION INTRAVENOUS CONTINUOUS
Status: CANCELLED | OUTPATIENT
Start: 2024-01-18 | End: 2024-01-19

## 2024-01-18 RX ORDER — OXYCODONE HYDROCHLORIDE 5 MG/1
10 TABLET ORAL
Status: CANCELLED | OUTPATIENT
Start: 2024-01-18

## 2024-01-18 RX ORDER — BISACODYL 10 MG/1
10 SUPPOSITORY RECTAL EVERY 12 HOURS PRN
Status: CANCELLED | OUTPATIENT
Start: 2024-01-18

## 2024-01-18 RX ORDER — MIDAZOLAM HYDROCHLORIDE 1 MG/ML
4 INJECTION INTRAMUSCULAR; INTRAVENOUS
Status: CANCELLED | OUTPATIENT
Start: 2024-01-18 | End: 2024-01-19

## 2024-01-18 RX ORDER — AMOXICILLIN 250 MG
1 CAPSULE ORAL 2 TIMES DAILY
Status: CANCELLED | OUTPATIENT
Start: 2024-01-18

## 2024-01-18 RX ORDER — PREGABALIN 75 MG/1
75 CAPSULE ORAL NIGHTLY
Status: CANCELLED | OUTPATIENT
Start: 2024-01-18

## 2024-01-18 RX ORDER — TALC
6 POWDER (GRAM) TOPICAL NIGHTLY PRN
Status: CANCELLED | OUTPATIENT
Start: 2024-01-18

## 2024-01-18 RX ORDER — MORPHINE SULFATE 2 MG/ML
2 INJECTION, SOLUTION INTRAMUSCULAR; INTRAVENOUS
Status: CANCELLED | OUTPATIENT
Start: 2024-01-18

## 2024-01-18 RX ORDER — POLYETHYLENE GLYCOL 3350 17 G/17G
17 POWDER, FOR SOLUTION ORAL DAILY
Status: CANCELLED | OUTPATIENT
Start: 2024-01-19

## 2024-01-18 RX ORDER — FENTANYL CITRATE 50 UG/ML
100 INJECTION, SOLUTION INTRAMUSCULAR; INTRAVENOUS
Status: CANCELLED | OUTPATIENT
Start: 2024-01-18 | End: 2024-01-19

## 2024-01-18 RX ORDER — SODIUM CHLORIDE 0.9 % (FLUSH) 0.9 %
10 SYRINGE (ML) INJECTION
Status: CANCELLED | OUTPATIENT
Start: 2024-01-18

## 2024-01-18 RX ORDER — MUPIROCIN 20 MG/G
1 OINTMENT TOPICAL 2 TIMES DAILY
Status: CANCELLED | OUTPATIENT
Start: 2024-01-18 | End: 2024-01-23

## 2024-01-18 RX ORDER — FAMOTIDINE 20 MG/1
20 TABLET, FILM COATED ORAL 2 TIMES DAILY
Status: CANCELLED | OUTPATIENT
Start: 2024-01-18

## 2024-01-18 RX ORDER — CELECOXIB 200 MG/1
200 CAPSULE ORAL DAILY
Status: CANCELLED | OUTPATIENT
Start: 2024-01-19

## 2024-01-18 RX ORDER — ASPIRIN 81 MG/1
81 TABLET ORAL 2 TIMES DAILY
Status: CANCELLED | OUTPATIENT
Start: 2024-01-18

## 2024-01-18 RX ORDER — SODIUM CHLORIDE 9 MG/ML
INJECTION, SOLUTION INTRAVENOUS
Status: CANCELLED | OUTPATIENT
Start: 2024-01-18

## 2024-01-18 RX ORDER — OXYCODONE HYDROCHLORIDE 5 MG/1
5 TABLET ORAL
Status: CANCELLED | OUTPATIENT
Start: 2024-01-18

## 2024-01-18 RX ORDER — METHOCARBAMOL 750 MG/1
750 TABLET, FILM COATED ORAL 3 TIMES DAILY
Status: CANCELLED | OUTPATIENT
Start: 2024-01-18

## 2024-01-18 RX ORDER — NALOXONE HCL 0.4 MG/ML
0.02 VIAL (ML) INJECTION
Status: CANCELLED | OUTPATIENT
Start: 2024-01-18

## 2024-01-18 RX ORDER — ACETAMINOPHEN 500 MG
1000 TABLET ORAL EVERY 6 HOURS
Status: CANCELLED | OUTPATIENT
Start: 2024-01-18

## 2024-01-18 RX ORDER — CEFAZOLIN SODIUM 2 G/50ML
2 SOLUTION INTRAVENOUS
Status: CANCELLED | OUTPATIENT
Start: 2024-01-18

## 2024-01-18 RX ORDER — CEFAZOLIN SODIUM 2 G/50ML
2 SOLUTION INTRAVENOUS
Status: CANCELLED | OUTPATIENT
Start: 2024-01-18 | End: 2024-01-19

## 2024-01-18 RX ORDER — LIDOCAINE HYDROCHLORIDE 10 MG/ML
1 INJECTION, SOLUTION EPIDURAL; INFILTRATION; INTRACAUDAL; PERINEURAL
Status: CANCELLED | OUTPATIENT
Start: 2024-01-18

## 2024-01-18 RX ORDER — PREGABALIN 75 MG/1
75 CAPSULE ORAL
Status: CANCELLED | OUTPATIENT
Start: 2024-01-18

## 2024-01-18 RX ORDER — CELECOXIB 200 MG/1
400 CAPSULE ORAL ONCE
Status: CANCELLED | OUTPATIENT
Start: 2024-01-18 | End: 2024-01-18

## 2024-01-18 RX ORDER — ACETAMINOPHEN 500 MG
1000 TABLET ORAL
Status: CANCELLED | OUTPATIENT
Start: 2024-01-18

## 2024-01-18 NOTE — ASSESSMENT & PLAN NOTE
Gastric adenocarcinoma  May 2021  Found on work up for bariatric surgery  Had Chemo   No , Radiation chemo in 2023     Having Endoscopy 1/24 to check on this     Has a port    She plans on having the port removed in the next few months

## 2024-01-18 NOTE — H&P
CC:  Left knee pain    Ca Coats is a 73 y.o. female with history of Left knee pain. Pain is worse with activity and weight bearing.  Patient has experienced interference of activities of daily living due to decreased range of motion and an increase in joint pain and swelling.  Patient has failed non-operative treatment including NSAIDs, corticosteroid injections, viscosupplement injections, and activity modification.  Ca Coats currently ambulates independently.     Relevant medical conditions of significance in perioperative period:  HTN- on medication managed by PCP  HLD- on medication managed by PCP  HFpEF- monitored by Cardiology  Hx of DVT- on medication managed by PCP       Given documented medical comorbidities including those listed above and based off of CMS criteria patient would meet inpatient admission status guidelines. This case has been approved as inpatient.     Past Medical History:   Diagnosis Date    YOUNG (acute kidney injury) 10/15/2021    Anemia     2018    Arthritis     BMI 60.0-69.9, adult     Cataract     Chronic diastolic congestive heart failure 01/27/2021    Deep vein thrombosis     Depression     Dry eye syndrome     DVT of deep femoral vein, right 05/29/2023    Gastric adenocarcinoma 05/25/2021    GERD (gastroesophageal reflux disease)     Glaucoma suspect     History of gastric ulcer     History of psychiatric hospitalization     Hx of psychiatric care     Celexa, no recall of charted Effexor, Lexapro, Desyrel prescriptions    Hyperlipidemia     Hypertension     Hypothyroidism     Morbid obesity     Neuromuscular disorder     Sleep apnea     Thyroid disease        Past Surgical History:   Procedure Laterality Date    APPENDECTOMY      CARDIAC SURGERY      CATARACT EXTRACTION W/  INTRAOCULAR LENS IMPLANT Bilateral     MFIOL OU    CORONARY ANGIOGRAPHY Bilateral 02/24/2021    Procedure: ANGIOGRAM, CORONARY ARTERY;  Surgeon: Cresencio Ridley MD;  Location:  [facility] CATH LAB;  Service: Cardiology;  Laterality: Bilateral;  Covid test needed - ok per Danay MORENOU. Pt. w/o transportation or family    ENDOSCOPIC ULTRASOUND OF UPPER GASTROINTESTINAL TRACT N/A 03/17/2021    Procedure: ULTRASOUND, UPPER GI TRACT, ENDOSCOPIC;  Surgeon: Gerald Anaya MD;  Location: [facility] (2ND FLR);  Service: Endoscopy;  Laterality: N/A;  Pt unable to get a ride for swab. Will do rapid test at 8:30 - ttr    ENDOSCOPIC ULTRASOUND OF UPPER GASTROINTESTINAL TRACT N/A 01/13/2022    Procedure: ULTRASOUND, UPPER GI TRACT, ENDOSCOPIC;  Surgeon: Kevin Carballo MD;  Location: [facility] (2ND FLR);  Service: Endoscopy;  Laterality: N/A;  blood thinner approval received, see telephone encounter 1/7/22-BB  rapid  instructions given verbally and sent to address on file in pt's chart-BB    ENDOSCOPIC ULTRASOUND OF UPPER GASTROINTESTINAL TRACT N/A 10/11/2022    Procedure: ULTRASOUND, UPPER GI TRACT, ENDOSCOPIC;  Surgeon: Dequan Ridley MD;  [facility]  Service: Endoscopy;  Laterality: N/A;    ESOPHAGOGASTRODUODENOSCOPY N/A 02/05/2021    Procedure: EGD (ESOPHAGOGASTRODUODENOSCOPY);  Surgeon: Matthew Hernadez MD;  Location: [facility] (2ND FLR);  Service: Endoscopy;  Laterality: N/A;  BMI-58    Wt: 361#     COVID test at [facility] on 2/2-GT    ESOPHAGOGASTRODUODENOSCOPY N/A 03/17/2021    Procedure: EGD (ESOPHAGOGASTRODUODENOSCOPY);  Surgeon: Gerald Anaya MD;  Location: [facility] (2ND FLR);  Service: Endoscopy;  Laterality: N/A;    ESOPHAGOGASTRODUODENOSCOPY N/A 05/25/2021    Procedure: EGD (ESOPHAGOGASTRODUODENOSCOPY);  Surgeon: Kevin Carballo MD;  Location: [facility] (2ND FLR);  Service: Endoscopy;  Laterality: N/A;  ESD 2 hours  Full COVID vaccination on file. ttr    ESOPHAGOGASTRODUODENOSCOPY N/A 01/13/2022    Procedure: EGD (ESOPHAGOGASTRODUODENOSCOPY);  Surgeon: Kevin Carballo MD;  Location: [facility] ([address] [place] [address]);  Service: Endoscopy;  Laterality: N/A;  blood thinner approval, see  telephone encounter 1/7/22-BB  rapid  instructions given verbally and sent to address on file in pt's chart-BB    ESOPHAGOGASTRODUODENOSCOPY N/A 10/11/2022    Procedure: EGD (ESOPHAGOGASTRODUODENOSCOPY);  Surgeon: Dequan Ridley MD;  Location: [facility] ([address] [place] [address]);  Service: Endoscopy;  Laterality: N/A;  She is do for EGD/EUS now. Personal history of gastric cancer. Ca Coats #[id number].   Thanks,   Kevin Fuentes MD    Pt is fully vaccinated-DS  9/14/22-Approval to hold Eliquis rec'd from Dr. Pelaez (see telephone encounter 9/14/22)-DS  9/14/22-Ins    EYE SURGERY      HYSTEROSCOPIC POLYPECTOMY OF UTERUS  1/10/2024    Procedure: POLYPECTOMY, UTERUS, HYSTEROSCOPIC;  Surgeon: Pina Pickens MD;  Location: [facility];  Service: OB/GYN;;    HYSTEROSCOPY WITH DILATION AND CURETTAGE OF UTERUS N/A 1/10/2024    Procedure: HYSTEROSCOPY, WITH DILATION AND CURETTAGE OF UTERUS;  Surgeon: Pina Pickens MD;  Location: [facility] OR;  Service: OB/GYN;  Laterality: N/A;    INJECTION OF ANESTHETIC AGENT AROUND NERVE Left 07/10/2018    Procedure: BLOCK, NERVE;  Surgeon: Samantha Vidal MD;  Location: [facility] PAIN MGT;  Service: Pain Management;  Laterality: Left;  Genicular     INJECTION OF ANESTHETIC AGENT AROUND NERVE Left 07/19/2019    Procedure: Block, Nerve NERVE BLOCK GENICULAR WITH PHENOL 6%;  Surgeon: Samantha Vidal MD;  Location: [facility] PAIN MGT;  Service: Pain Management;  Laterality: Left;  NEEDS CONSENT    INSERTION OF TUNNELED CENTRAL VENOUS CATHETER (CVC) WITH SUBCUTANEOUS PORT N/A 07/09/2021    Procedure: INSERTION, PORT-A-CATH;  Surgeon: Mario Paz MD;  Location: [facility] CATH LAB;  Service: Radiology;  Laterality: N/A;  PORT PLACEMENT    RADIOFREQUENCY ABLATION Left 06/19/2020    Procedure: RADIOFREQUENCY ABLATION LEFT GENICULAR;  Surgeon: Tevin Clark MD;  Location: [facility] PAIN MGT;  Service: Pain Management;  Laterality: Left;  Left Genicular RFA    RADIOFREQUENCY ABLATION Left 01/22/2021    Procedure:  RADIOFREQUENCY ABLATION LEFT GENICULAR;  Surgeon: Tevin Clark MD;  Location: [facility] PAIN MGT;  Service: Pain Management;  Laterality: Left;    RADIOFREQUENCY ABLATION Left 07/30/2021    Procedure: RADIOFREQUENCY ABLATION, GENICULAR COOLED NEED CONSENT;  Surgeon: Tevin Clark MD;  Location: [facility] PAIN MGT;  Service: Pain Management;  Laterality: Left;    RADIOFREQUENCY ABLATION Left 04/22/2022    Procedure: RADIOFREQUENCY ABLATION, GENICULAR COOLED , LEFT;  Surgeon: Tevin Clrak MD;  Location: [facility] PAIN [facility]GT;  Service: Pain Management;  Laterality: Left;    RADIOFREQUENCY ABLATION Left 12/23/2022    Procedure: RADIOFREQUENCY ABLATION LEFT GENICULAR COOLED CLEARED TO HOLD ELIQUIS 3 DAYS  NEEDS CONSENT;  Surgeon: Tevin Clark MD;  Location: [facility] PAIN [facility]GT;  Service: Pain Management;  Laterality: Left;    TONSILLECTOMY         Family History   Problem Relation Age of Onset    No Known Problems Mother     No Known Problems Father     Colon cancer Neg Hx     Esophageal cancer Neg Hx        Review of patient's allergies indicates:  No Known Allergies      Current Outpatient Medications:     acetaminophen (TYLENOL) 500 MG tablet, , Disp: , Rfl:     amLODIPine (NORVASC) 10 MG tablet, TAKE 1 TABLET EVERY DAY, Disp: 90 tablet, Rfl: 1    atorvastatin (LIPITOR) 20 MG tablet, TAKE 1 TABLET EVERY DAY, Disp: 90 tablet, Rfl: 2    B-complex with vitamin C (Z-BEC OR EQUIV) tablet, Take 1 tablet by mouth once daily. , Disp: , Rfl:     CALCIUM CITRATE-VITAMIN D2 ORAL, Take 1 tablet by mouth once daily. , Disp: , Rfl:     citalopram (CELEXA) 10 MG tablet, TAKE 1 TABLET EVERY DAY, Disp: 90 tablet, Rfl: 3    dextran 70-hypromellose (TEARS) ophthalmic solution, Place 1 drop into the right eye every 6 (six) hours., Disp: 30 mL, Rfl: 0    ELIQUIS 5 mg Tab, TAKE 1 TABLET TWICE DAILY, Disp: 180 tablet, Rfl: 2    ferrous gluconate (FERGON) 324 MG tablet, TAKE 1 TABLET (324 MG TOTAL) BY MOUTH DAILY WITH BREAKFAST., Disp: 90 tablet,  "Rfl: 3    furosemide (LASIX) 80 MG tablet, TAKE 1 TABLET TWICE DAILY (Patient taking differently: Take 80 mg by mouth 2 (two) times a day.), Disp: 180 tablet, Rfl: 3    gabapentin (NEURONTIN) 300 MG capsule, Take 2 capsules (600 mg total) by mouth 3 (three) times daily., Disp: 540 capsule, Rfl: 2    LIDOcaine-prilocaine (EMLA) cream, Apply topically as needed (prior to port access)., Disp: 30 g, Rfl: 0    lisinopriL (PRINIVIL,ZESTRIL) 40 MG tablet, TAKE 1 TABLET EVERY DAY (Patient taking differently: every evening. TAKE 1 TABLET EVERY NIGHT), Disp: 90 tablet, Rfl: 1    multivitamin with minerals tablet, Take 1 tablet by mouth once daily. , Disp: , Rfl:     oxyCODONE-acetaminophen (PERCOCET) 5-325 mg per tablet, Take 1 tablet by mouth every 8 (eight) hours as needed for Pain., Disp: 90 tablet, Rfl: 0    pantoprazole (PROTONIX) 40 MG tablet, TAKE 1 TABLET EVERY DAY, Disp: 90 tablet, Rfl: 11    semaglutide (OZEMPIC) 1 mg/dose (4 mg/3 mL), INJECT 1 MG INTO THE SKIN EVERY 7 DAYS., Disp: 9 each, Rfl: 11  No current facility-administered medications for this visit.    Facility-Administered Medications Ordered in Other Visits:     0.9%  NaCl infusion, , Intravenous, Continuous, Tevin Clark MD, Stopped at 01/13/22 1055    Review of Systems:  Constitutional: no fever or chills  Eyes: no visual changes  ENT: no nasal congestion or sore throat  Respiratory: no cough or shortness of breath  Cardiovascular: no chest pain or palpitations  Gastrointestinal: no nausea or vomiting, tolerating diet  Genitourinary: no hematuria or dysuria  Integument/Breast: no rash or pruritis  Hematologic/Lymphatic: no easy bruising or lymphadenopathy  Musculoskeletal: positive for knee pain  Neurological: no seizures or tremors  Behavioral/Psych: no auditory or visual hallucinations  Endocrine: no heat or cold intolerance    PE:  Ht 5' 6" (1.676 m)   Wt 118.4 kg (261 lb)   BMI 42.13 kg/m²   General: Pleasant, cooperative, NAD   Gait: " antalgic  HEENT: NCAT, sclera nonicteric   Lungs: Respirations clear bilaterally; equal and unlabored.   CV: S1S2; 2+ bilateral upper and lower extremity pulses.   Skin: Intact throughout with no rashes, erythema, or lesions  Extremities: No LE edema,  no erythema or warmth of the skin in either lower extremity.    Left knee exam:  Knee Range of Motion:0-125, pain with passive range of motion  Effusion:mild  Condition of skin:intact  Location of tenderness:Medial joint line   Strength:normal  Stability: stable to testing    Hip Examination: painless PROM of hip     Radiographs: Radiographs reveal advanced degenerative changes including subchondral cyst formation, subchondral sclerosis, osteophyte formation, joint space narrowing.     Knee Alignment: normal    Diagnosis: Primary osteoarthritis Left knee    Plan: Left total knee arthroplasty    Due to the serious nature of total joint infection and the high prevalence of community acquired MRSA, vancomycin will be used perioperatively.

## 2024-01-18 NOTE — PROGRESS NOTES
Ca Coats is a 73 y.o. year old here today for pre surgery optimization visit  in preparation for a Left total knee arthroplasty to be performed by Dr. Hensley  on 2/7/24.  she was last seen and treated in the clinic on 12/7/2023. she will be medically optimized by the pre op center. There has been no significant change in medical status since last visit. No fever, chills, malaise, or unexplained weight change.      Allergies, Medications, past medical and surgical history reviewed.    Focused examination performed.    Patient declined to see surgeon today. All questions answered. Patient encouraged to call with questions. Contact information given.     Pre, jailene, and post operative procedures and expectations discussed. Goals of successful surgery reviewed and include manageable pain levels, surgical site free of infection, medication management, and ambulation with PT/OT assistance. Healthy weight management discussed with patient and caregiver who were receptive to eduction of healthy diet and activity. No other necessary lifestyle changes identified. Educated patient about signs and symptoms of infection, medication management, anticoagulation therapy, risk of tobacco and alcohol use, and self-care to promote healing. Surgical guide given for future reference. Hibiclens given to patient with instructions. All questions were answered.     Ca Coats verbalized an understanding to the education and goals. Patient has displayed readiness to engage in care and is ready to proceed with surgery.  Patient reports personal sitter is able and ready to provide assistance at home after discharge.    Surgical and blood consents signed.    DME will be arranged. This walker is to be ordered for use during Hospital Admission Only -- Not to be ordered for [employer] (HME). The mobility limitation cannot be sufficiently resolved by the use of a cane. Patient's functional mobility deficit can  "be sufficiently resolved with the use of a (Rolling Walker or Walker). Patient's mobility limitation significantly impairs their ability to participate in one of more activities of daily living. The use of a (Rolling Walker or Walker) will significantly improve the patient's ability to participate in MRADLS and the patient will use it on regular basis in the home."     Ca Coats will contact us if there are any questions, concerns, or changes in medical status prior to surgery.       Joint class: 1/25/24    Patient has discussed discharge planning with surgeon. Patient will be discharged to home following surgery.   patient will be scheduled with [facility]. PT will be done at the [facility] location.    30 minutes of time was spent on patient education, review of records, templating, H&P, , appointment scheduling and optimizing patient for surgery.    "

## 2024-01-18 NOTE — ASSESSMENT & PLAN NOTE
Sept 2021  Thrombosis of the right popliteal, posterior tibial, and peroneal veins.      History of DVT  No PE  1 Episode  Associated with reduced mobility from getting Chemo, Radiation at that time,  No long journeys, cancer,no prior surgery, No Hospital stay, no HRT  Anticoagulated   IVC filter  - none     On Anticoagulation - Apixaban     Provoked DVT     - Cancer   - Reduced Mobility   - Tobacco      On on going Anticoagulation as - she is less mobile      No Afib            No contraindications to holding anticoagulation for surgery      Date of  surgery - 1/10  Planned anesthesia for knee replacement surgery- [facility]  Last day of  use pre op- 1/6        Date of Knee replacement surgery - 2/7 /2024  Type of Anesthesia- [facility]  Last day of  use pre op- Feb 3 rd 2024      As per her understanding , holding Apixaban 3 days prior to EUS on 1 / 24

## 2024-01-18 NOTE — HPI
History of present illness- I had the pleasure of meeting this pleasant 73 y.o. lady in the pre op clinic prior to her elective Orthopedic surgery. The patient is known   to me . I have obtained the history by speaking to the patient and by reviewing the electronic health records.    Events leading up to surgery / History of presenting illness -      She has been troubled with severe  Left knee  pain for a long time  .   Pain increases with activity and decreases with percocet  She had not had any previous left knee surgeries     She could not have the knee replacement as she needed to lose weight and she was going to have a lap band but she was diagnosed with gastric cancer for which she had  chemotherapy  She also had radiation treatment for the cancer  [id number]     She has since lost weight and is down to 261        Postmenopausal bleeding   She finished with her menstrual cycles when she was about 42 years of age  She has been having abnormal vaginal bleeding for the last 2 months for which she had gynecology evaluation and is had  hysteroscopy and dilatation curettage of the uterus 1/10  He has currently not having any vaginal bleeding except mild bleeding that she attributes to the procedure  She has not  currently sexually active and has not been sexually active in many years    Pathology     1. Fragments of a benign endometrial polyp   2. Small fragments of inactive endometrium and benign cervical tissue         Relevant health conditions of significance for the perioperative period/ History of presenting illness -      Opioid use   HTN  Deep vein thrombosis  Anticoagulation  Sleep apnea  BMI 42.13  Acid reflux  Gastric adenocarcinoma  May 2021Found on work up for bariatric surgery  DVT   Tobacco smoke         She lives in a ground level apartment complex  She lives by herself and and has help every day about 4-5 hours, Monday through Friday and her neighbor helps during the weekend  She has a son who is  age 54 years and the daughter who is age 44 Y  Her daughter lives in [place] and her son lives in [place]  Her daughter is going to help her after surgery

## 2024-01-18 NOTE — ASSESSMENT & PLAN NOTE
She has tingling numbness of the hands  She has not a diabetic and does not drink alcohol excessively  She has not known to have cervical spine problems  She has no balance problems, problems with small objects or dropping things  She has not known to have carpal tunnel   She is on gabapentin that is helping

## 2024-01-18 NOTE — ASSESSMENT & PLAN NOTE
Feb 2022     The left ventricle is normal in size with normal systolic function. The estimated ejection fraction is 65%.  Grade II left ventricular diastolic dysfunction.  Normal right ventricular size with normal right ventricular systolic function.  Biatrial enlargement.  Mild tricuspid regurgitation.  The estimated PA systolic pressure is 41 mmHg.  There is pulmonary hypertension.  Normal central venous pressure (3 mmHg).  There is mobile annular calcifications attached to the nikhil-lateral part of annulus of the valve ( image 46). Clinical correlation is required.     Not known to have Congestive heart failure     Diastolic Dysfunction:  I  suggest watching her fluid status perioperatively and to watch out for fluid overload in view of her Diastolic Dysfunction.  I suggest avoidance of the ordering of continuous IV fluids and if IV fluids are required ,to order for a specific duration of time and consider ordering lower IV fluid rate       Pulmonary arterial hypertension         WHO functional classification      Class 1 - No limitation ( with reference to fatigue or dyspnea, chest pain, or heart syncope)        Clinical classification - based on etiology      Group 2  Group 3         [place] heart association functional class         Class 2  Upon lifting heavier things          Suggest avoidance of Intra vascular volume over load or reduced pre load      I suggest that the anaesthesiologist be aware of the Pulmonary artery hypertension , so that se

## 2024-01-18 NOTE — ASSESSMENT & PLAN NOTE
Gets nervous   On Celexa  I suggest monitoring the sodium as SIADH from Celexa  use and hypersecretion of ADH associated with surgery can reduce sodium in the perioperative period   She seems to be doing fairly well from a mental health standpoint and she has a supportive family    Her daughter lives local and is going to help her bring her to surgery and take her home and help her with the recovery  Her daughter plans on coming to her house to help her during the recovery process

## 2024-01-18 NOTE — ASSESSMENT & PLAN NOTE
She gets dizziness upon changing positions and also randomly  She has been having this for some time  She had neurology evaluation yesterday and is planning on having an MRI scan next week on the 26 th surgery January   No stroke  Not known to have ear problems

## 2024-01-18 NOTE — PROGRESS NOTES
[facility]  Progress Note    Patient Name: Ca Coats  MRN: [id number]  Date of Evaluation- 01/18/2024  PCP- Lei Garcia MD    Future cases for Isabel Coats [[id number]]       Case ID Status Date Time Casimiro Procedure Provider Location               [id number] [facility] 2/7/2024  8:00  ARTHROPLASTY, KNEE, TOTAL: Left: Dwight Guallpa MD [[id number]] [facility] OR 2ND FLR            HPI:  History of present illness- I had the pleasure of meeting this pleasant 73 y.o. lady in the pre op clinic prior to her elective Orthopedic surgery. The patient is known   to me . I have obtained the history by speaking to the patient and by reviewing the electronic health records.    Events leading up to surgery / History of presenting illness -      She has been troubled with severe  Left knee  pain for a long time  .   Pain increases with activity and decreases with percocet  She had not had any previous left knee surgeries     She could not have the knee replacement as she needed to lose weight and she was going to have a lap band but she was diagnosed with gastric cancer for which she had  chemotherapy  She also had radiation treatment for the cancer  [id number]     She has since lost weight and is down to 261        Postmenopausal bleeding   She finished with her menstrual cycles when she was about 42 years of age  She has been having abnormal vaginal bleeding for the last 2 months for which she had gynecology evaluation and is had  hysteroscopy and dilatation curettage of the uterus 1/10  He has currently not having any vaginal bleeding except mild bleeding that she attributes to the procedure  She has not  currently sexually active and has not been sexually active in many years    Pathology     1. Fragments of a benign endometrial polyp   2. Small fragments of inactive endometrium and benign cervical tissue         Relevant health conditions of significance for the perioperative period/  History of presenting illness -      Opioid use   HTN  Deep vein thrombosis  Anticoagulation  Sleep apnea  BMI 42.13  Acid reflux  Gastric adenocarcinoma  May 2021Found on work up for bariatric surgery  DVT   Tobacco smoke         She lives in a ground level apartment complex  She lives by herself and and has help every day about 4-5 hours, Monday through Friday and her neighbor helps during the weekend  She has a son who is age 54 years and the daughter who is age 44 Y  Her daughter lives in [place] and her son lives in [place]  Her daughter is going to help her after surgery                  Subjective/ Objective:     Chief complaint-Preoperative evaluation, Perioperative Medical management, complication reduction plan     Active cardiac conditions- none    Revised cardiac risk index predictors- none    Functional capacity -Examples of physical activity  -she does exercises at home, can take a flight of stairs holding on to the railing----- She can undertake all the above activities without  chest pain,chest tightness, Shortness of breath ,dizziness,lightheadedness making her exercise tolerance more,   than 4 Mets.   Winded on exertion, upon walking fast or upon walking with heavier objects-eases with relaxation- not new -this has improved since lost weight suggesting that the reason for this could be weight    Review of Systems   Constitutional:  Negative for chills and fever.   HENT:          Sleep apnea   Eyes:         No new visual changes   Respiratory:          No cough , phlegm    No Hemoptysis   Cardiovascular:         As noted   Gastrointestinal:         No overt GI/U blood losses  Bowel movements- Regular  She has black stool from iron   Endocrine:        Prednisone use > 20 mg daily for 3 weeks- None   Genitourinary:  Negative for dysuria.        No urinary hesitancy    Musculoskeletal:         As above      Skin:  Negative for rash.   Neurological:  Negative for syncope.        No unilateral  "weakness   Hematological:         Current use of Anticoagulants  Yes   Psychiatric/Behavioral:          .  No SI/HI   Not known to have lupus, rheumatoid arthritis  Not known to myasthenia  No vascular stenting          No anesthesia, bleeding, cardiac problems, PONV with previous surgeries/procedures.  Medications and Allergies reviewed in epic.   Limited family history  FH- No anesthesia,bleeding / venous thrombosis , in family    Not a diabetic to her knowledge  Physical Exam  Blood pressure (!) 114/58, pulse 75, resp. rate 16, height 5' 6" (1.676 m), weight 118.4 kg (261 lb), SpO2 96 %.    I offered a sheet  and the presence of a chaperone during physical examination   She was comfortable to proceed with the exam without the the presence of a chaperone        Physical Exam  Constitutional- Vitals - Body mass index is 42.13 kg/m².,   Vitals:    01/18/24 0803   BP: (!) 114/58   Pulse: 75   Resp: 16     General appearance-Conscious,Coherent  Eyes- No conjunctival icterus,pupils  round  and  bilateral intra ocular lenses  ENT-Oral cavity- moist ,  upper denture, and lower denture    , Hearing grossly normal   Neck- No thyromegaly ,Trachea -central, No jugular venous distension,   No Carotid Bruit   Cardiovascular -Heart Sounds- Normal ,  systolic murmur aortic area ,  systolic murmur pulmonary area , and systolic murmur tricuspid area   , No gallop rhythm   Respiratory - Normal Respiratory Effort, Normal breath sounds,  CrepitationsLeft base,  no wheeze , and  no forced expiratory wheeze    Peripheral pitting pedal edema-- mild, no calf pain   Gastrointestinal -Soft abdomen, No palpable masses, Non Tender,Liver,Spleen not palpable. No-- free fluid and shifting dullness  Musculoskeletal- No finger Clubbing. Strength grossly normal   Lymphatic-No Palpable cervical, axillary,Inguinal lymphadenopathy   Psychiatric - normal effect,Orientation  Rt Dorsalis pedis pulses-palpable    Lt Dorsalis pedis pulses- palpable   Rt " Posterior tibial pulses -palpable   Left posterior tibial pulses -palpable   Miscellaneous -  no renal bruit   Investigations  Lab and Imaging have been reviewed in epic.      Review of old records- Was done and information gathered regards to events leading to surgery and health conditions of significance in the perioperative period.        Preoperative cardiac risk assessment-  The patient does not have any active cardiac conditions . Revised cardiac risk index predictors- 0---.Functional capacity is more than 4 Mets. She will be undergoing a Orthopedic procedure that carries a Moderate Risk risk     Risk of a major Cardiac event ( Defined as death, myocardial infarction, or cardiac arrest at 30 days after noncardiac surgery), based on RCRI score     -3.9%       No further cardiac work up is indicated prior to proceeding with the surgery          American Society of Anesthesiologists Physical status classification ( ASA ) class: 3     Postoperative pulmonary complication risk assessment:      ARISCAT ( Canet) risk index- risk class -  Low, if duration of surgery is under 3 hours, intermediate, if duration of surgery is over 3 hours          Assessment/Plan:     Chronic pain  Left knee pain   Planning on having knee replacement     Nervous  Gets nervous   On Celexa  I suggest monitoring the sodium as SIADH from Celexa  use and hypersecretion of ADH associated with surgery can reduce sodium in the perioperative period   She seems to be doing fairly well from a mental health standpoint and she has a supportive family    Her daughter lives local and is going to help her bring her to surgery and take her home and help her with the recovery  Her daughter plans on coming to her house to help her during the recovery process    Opioid use  Percocet  up to 3 times a day   For Left knee pain  Care with Tylenol      Chronic continuous opioid use- In view of the opioid use, the patient may have opioid tolerance . I suggest  considering the possibility of opioid tolerance  in planning post operative pain control      Discussed possibly reducing the opioid use in preparation for surgery , so that it reduces the opioid tolerance which helps post op pain control      Suggested letting pain management know about the up coming surgery        She is willing to reduce the opioid in preparation for surgery  Suggested avoidance of abruptly stopping opioid    Abnormal cardiovascular stress test  2021     Normal coronary arteries       Chronic diastolic congestive heart failure  Feb 2022     The left ventricle is normal in size with normal systolic function. The estimated ejection fraction is 65%.  Grade II left ventricular diastolic dysfunction.  Normal right ventricular size with normal right ventricular systolic function.  Biatrial enlargement.  Mild tricuspid regurgitation.  The estimated PA systolic pressure is 41 mmHg.  There is pulmonary hypertension.  Normal central venous pressure (3 mmHg).  There is mobile annular calcifications attached to the nikhil-lateral part of annulus of the valve ( image 46). Clinical correlation is required.     Not known to have Congestive heart failure     Diastolic Dysfunction:  I  suggest watching her fluid status perioperatively and to watch out for fluid overload in view of her Diastolic Dysfunction.  I suggest avoidance of the ordering of continuous IV fluids and if IV fluids are required ,to order for a specific duration of time and consider ordering lower IV fluid rate       Pulmonary arterial hypertension         WHO functional classification      Class 1 - No limitation ( with reference to fatigue or dyspnea, chest pain, or heart syncope)        Clinical classification - based on etiology      Group 2  Group 3         [place] heart association functional class         Class 2  Upon lifting heavier things          Suggest avoidance of Intra vascular volume over load or reduced pre load      I suggest  that the anaesthesiologist be aware of the Pulmonary artery hypertension , so that se    Essential hypertension  On Amlodipine- Morning , Lisinopril - Evening      Hypertension-  Blood pressure is acceptable . I suggest continuation of --Amlodipine, Lisinopril------ during the entire perioperative period. I suggest blood pressure, electrolyte and renal function monitoring .I suggest addressing pain control as uncontrolled pain can increased blood pressure      Gets dizzy on position changes   Suggested to change positions gradually and to stay hydrated      Pure hypercholesterolemia  HLD-I  suggest continuation of statin during the entire perioperative period.     s/p hscope/D&C/polypectomy  She is doing good from a gynecological standpoint    History of Clostridium difficile infection  Antibiotic related  Suggested care with antibiotic use, Hospitalization       History of DVT (deep vein thrombosis)    Sept 2021  Thrombosis of the right popliteal, posterior tibial, and peroneal veins.      History of DVT  No PE  1 Episode  Associated with reduced mobility from getting Chemo, Radiation at that time,  No long journeys, cancer,no prior surgery, No Hospital stay, no HRT  Anticoagulated   IVC filter  - none     On Anticoagulation - Apixaban     Provoked DVT     - Cancer   - Reduced Mobility   - Tobacco      On on going Anticoagulation as - she is less mobile      No Afib            No contraindications to holding anticoagulation for surgery      Date of  surgery - 1/10  Planned anesthesia for knee replacement surgery- Regional  Last day of  use pre op- 1/6        Date of Knee replacement surgery - 2/7 /2024  Type of Anesthesia- Regional  Last day of  use pre op- Feb 3 rd 2024      As per her understanding , holding Apixaban 3 days prior to EUS on 1 / 24          Anemia  No overt GI/ Urinary blood losses   Hb Stable  WCC, PLT-N    Gastric adenocarcinoma  Gastric adenocarcinoma  May 2021  Found on work up for bariatric  surgery  Had Chemo   No , Radiation chemo in 2023     Having Endoscopy 1/24 to check on this     Has a port    She plans on having the port removed in the next few months    BMI 40.0-44.9, adult  Stated height - 5 f 6 inches  Weight 261.5   BMI 42.2     Weight related conditions      Known to have      HTN  Hyperlipidemia   Sleep apnea   Acid reflux   Osteoarthritis     Not troubled with / Not known to have      Type 2 Diabetes   Fatty liver         Encouraged weight loss      On Ozempic weekly  Saturday      Skip the dose before surgery     Hypothyroidism  No ongoing problem   TSH 2021- N  No overt Hypo/ Hyper thyroid symptoms       Constipation  Not troubled      Beets helping      Contributors- Iron, Opioid      Suggested working on bowel movements in preparation for surgery   Constipation- I suggest giving laxative regimen as opioid use,reduced ambulation  can increase the constipation      Gastroesophageal reflux disease  Does not sound Cardiac         GERD-  I suggest continuation of the Proton pump inhibitor in the perioperative period . I suggest aspiration precautions     Edema  Feet swelling  Equal            Not known to have Varicose veins   Does not eat Salted food   Not on NSAID use      Not known to have      Heart failure   Liver failure   Kidney failure      Edema- I suggested avoidance of added salt,avoidance of NSAID's, unless advised or ordered  and suggested Limb elevation and gill hose use                      Obstructive sleep apnea  On CPAP      Sleep apnea .Uses CPAP .Suggested bringing for hospital use . Informed the risk of worsening sleep apnea in the perioperative period and suggest using CPAP use any time in 24 hrs ( day or night )for planned sleep        I suggest  caution with usage of medication that can cause respiratory suppression in the perioperative period        Avoidance of  supine sleep, weight gain , alcoholic beverages , care with , sedative , CNS depressant use indicated   since all of these can worsen LAURA       Tobacco abuse  Was Using Nicotine patch as needed when she has the urge to smoking  Quit Smoking > 3 months       I suggested to consider stopping  smoking tobacco for its benefits in the [facility] operative period and in the long term     I  Informed about risk of wound healing problem ,infection,lung complications,thrombosis with tobacco use      Suggested quitting tobacco      Offered tobacco cessation service      Not known to have COPD, Bronchitis, Emphysema, wheezing , chronic phlegm   No inhaler, oxygen use      Dizziness  She gets dizziness upon changing positions and also randomly  She has been having this for some time  She had neurology evaluation yesterday and is planning on having an MRI scan next week on the 26 th surgery January   No stroke  Not known to have ear problems    Neuropathy  She has tingling numbness of the hands  She has not a diabetic and does not drink alcohol excessively  She has not known to have cervical spine problems  She has no balance problems, problems with small objects or dropping things  She has not known to have carpal tunnel   She is on gabapentin that is helping        Preventive perioperative care    Thromboembolic prophylaxis:  Her risk factors for thrombosis include surgical procedure, previous history of thrombosis, and age.I suggest  thromboembolic prophylaxis ( mechanical/pharmacological, weighing the risk benefits of pharmacological agent use considering [facility] procedural bleeding )  during the perioperative period.I suggested being active in the post operative period.   The patient is a candidate for extended DVT prophylaxis      Postoperative pulmonary complication prophylaxis-Risk factors for post operative pulmonary complications include ASA class >2- I suggest incentive spirometry use, early ambulation, and end tidal carbon dioxide monitoring  , oral care , head end of bed elevation      Renal complication prophylaxis-Risk  factors for renal complications include hypertension . I suggest keeping her well hydrated  in the perioperative period      Surgical site Infection Prophylaxis-I  suggest appropriate antibiotic for Prophylaxis against Surgical site infections  No reported Staph infection  Skin antibacterial discussed       Delirium prophylaxis-Risk factors - opioid use - I suggest avoidance / minimizing the use of  Benzodiazepines ( unless the patient has been taking it on a regular basis ),Anticholinergic medication,Antihistamines ( like  Benadryl).I suggest minimizing the use of opioid medication and use of IV tylenol,if it is appropriate. I suggest using the lowest possible dose of opioids for the shortest duration possible in the perioperative period. I suggest to Keep shades/blinds open during the day, lights off and shades closed at night to encourage normal sleep/wake cycle.I encourage the presence of the family member with the patient at all times, if at all possible as mental status changes can be picked up early by the family members and they help with reorientation. I encouraged the presence of family to help with orientation in the perioperative period. Benadryl avoidance suggested      In view of regional anesthesia  the patient  is at risk of postoperative urinary retention.  I suggest avoidance / minimizing the of  Benzodiazepines,Anticholinergic medication,antihistamines ( Benadryl) , if possible in the perioperative period. I suggest using the minimum possible use of opioids for the minimum period of time in the perioperative period. Benadryl avoidance suggested      This visit was focused on Preoperative evaluation, Perioperative Medical management, complication reduction plans. I suggest that the patient follows up with primary care or relevant sub specialists for ongoing health care.    I appreciate the opportunity to be involved in this patients care. Please feel free to contact me if there were any questions  about this consultation.    Patient is optimized    Patient/ care giver/ Family member was instructed to call and update me about any changes to health,  medication, office visits ,testing out side of the [facility] operative care center , hospitalizations between now and surgery      Lela Stevens MD  Internal Medicine  [facility]   Cell Phone- [phone]    History of COVID - no   COVID vaccination status -Yes    COVID screening     No fever   No cough   No SOB  No sore throat   No loss of taste or smell   No muscle aches   No nausea, vomiting , diarrhea -    Checked for over-the-counter medication    I have spent -90----- minutes of time which includes, time spent to prepare to see the patient , obtaining history ,performing examination, counseling/Educating the patient , Documenting clinical information in the record    --  1/18/2024- 1531    Free T4 normal   A1c, INR normal   We discussed hydration    EKG from today personally reviewed   Report pending she has no suggestions of symptomatic coronary artery disease    --  3/26/2024- 1354    EKG report from 1/18/2024    Normal sinus rhythm   Low voltage QRS in the precordial leads   Nonspecific ST and/or T wave abnormalities   Abnormal ECG   When compared with ECG of 14-OCT-2021 10:29,   No significant change was found     Aneurysms noted on MRI brain    I did a preop evaluation- she did not have surgery  Called to speak to her    Unable to speak   Unable to leave a message      I do not see that she is scheduled for the joint replacement surgery   I suggest to please involved me so that I can work on her perioperative optimization once  decided that she is going to move forward with the surgery

## 2024-01-18 NOTE — OUTPATIENT SUBJECTIVE & OBJECTIVE
"Outpatient Subjective & Objective     Chief complaint-Preoperative evaluation, Perioperative Medical management, complication reduction plan     Active cardiac conditions- none    Revised cardiac risk index predictors- none    Functional capacity -Examples of physical activity  -she does exercises at home, can take a flight of stairs holding on to the railing----- She can undertake all the above activities without  chest pain,chest tightness, Shortness of breath ,dizziness,lightheadedness making her exercise tolerance more,   than 4 Mets.   Winded on exertion, upon walking fast or upon walking with heavier objects-eases with relaxation- not new -this has improved since lost weight suggesting that the reason for this could be weight    Review of Systems   Constitutional:  Negative for chills and fever.   HENT:          Sleep apnea   Eyes:         No new visual changes   Respiratory:          No cough , phlegm    No Hemoptysis   Cardiovascular:         As noted   Gastrointestinal:         No overt GI/U blood losses  Bowel movements- Regular  She has black stool from iron   Endocrine:        Prednisone use > 20 mg daily for 3 weeks- None   Genitourinary:  Negative for dysuria.        No urinary hesitancy    Musculoskeletal:         As above      Skin:  Negative for rash.   Neurological:  Negative for syncope.        No unilateral weakness   Hematological:         Current use of Anticoagulants  Yes   Psychiatric/Behavioral:          .  No SI/HI   Not known to have lupus, rheumatoid arthritis  Not known to myasthenia  No vascular stenting          No anesthesia, bleeding, cardiac problems, PONV with previous surgeries/procedures.  Medications and Allergies reviewed in epic.   Limited family history  FH- No anesthesia,bleeding / venous thrombosis , in family    Not a diabetic to her knowledge  Physical Exam  Blood pressure (!) 114/58, pulse 75, resp. rate 16, height 5' 6" (1.676 m), weight 118.4 kg (261 lb), SpO2 96 " %.    I offered a sheet  and the presence of a chaperone during physical examination   She was comfortable to proceed with the exam without the the presence of a chaperone        Physical Exam  Constitutional- Vitals - Body mass index is 42.13 kg/m².,   Vitals:    01/18/24 0803   BP: (!) 114/58   Pulse: 75   Resp: 16     General appearance-Conscious,Coherent  Eyes- No conjunctival icterus,pupils  round  and  bilateral intra ocular lenses  ENT-Oral cavity- moist ,  upper denture, and lower denture    , Hearing grossly normal   Neck- No thyromegaly ,Trachea -central, No jugular venous distension,   No Carotid Bruit   Cardiovascular -Heart Sounds- Normal ,  systolic murmur aortic area ,  systolic murmur pulmonary area , and systolic murmur tricuspid area   , No gallop rhythm   Respiratory - Normal Respiratory Effort, Normal breath sounds,  CrepitationsLeft base,  no wheeze , and  no forced expiratory wheeze    Peripheral pitting pedal edema-- mild, no calf pain   Gastrointestinal -Soft abdomen, No palpable masses, Non Tender,Liver,Spleen not palpable. No-- free fluid and shifting dullness  Musculoskeletal- No finger Clubbing. Strength grossly normal   Lymphatic-No Palpable cervical, axillary,Inguinal lymphadenopathy   Psychiatric - normal effect,Orientation  Rt Dorsalis pedis pulses-palpable    Lt Dorsalis pedis pulses- palpable   Rt Posterior tibial pulses -palpable   Left posterior tibial pulses -palpable   Miscellaneous -  no renal bruit   Investigations  Lab and Imaging have been reviewed in epic.      Review of old records- Was done and information gathered regards to events leading to surgery and health conditions of significance in the perioperative period.    Outpatient Subjective & Objective

## 2024-01-18 NOTE — ASSESSMENT & PLAN NOTE
On Amlodipine- Morning , Lisinopril - Evening      Hypertension-  Blood pressure is acceptable . I suggest continuation of --Amlodipine, Lisinopril------ during the entire perioperative period. I suggest blood pressure, electrolyte and renal function monitoring .I suggest addressing pain control as uncontrolled pain can increased blood pressure      Gets dizzy on position changes   Suggested to change positions gradually and to stay hydrated

## 2024-01-19 LAB
ARSENIC BLD-MCNC: 3 NG/ML
CADMIUM BLD-MCNC: 1 NG/ML
CITY: NORMAL
COUNTY: NORMAL
GUARDIAN FIRST NAME: NORMAL
GUARDIAN LAST NAME: NORMAL
HOME PHONE: NORMAL
LEAD BLD-MCNC: 3 MCG/DL
MERCURY BLD-MCNC: <1 NG/ML
RACE: NORMAL
STATE: NORMAL
STREET ADDRESS: NORMAL
VENOUS/CAPILLARY: NORMAL
ZIP: NORMAL

## 2024-01-21 LAB
PATHOLOGIST INTERPRETATION IFE: NORMAL
PATHOLOGIST INTERPRETATION SPE: NORMAL

## 2024-01-23 LAB
A-TOCOPHEROL VIT E SERPL-MCNC: 1153 UG/DL (ref 500–1800)
COPPER SERPL-MCNC: 1287 UG/L (ref 810–1990)
PYRIDOXAL SERPL-MCNC: 12 UG/L (ref 5–50)
ZINC SERPL-MCNC: 69 UG/DL (ref 60–130)

## 2024-01-24 ENCOUNTER — HOSPITAL ENCOUNTER (OUTPATIENT)
Facility: HOSPITAL | Age: 74
Discharge: HOME | End: 2024-01-24
Attending: INTERNAL MEDICINE | Admitting: INTERNAL MEDICINE
Payer: MEDICARE

## 2024-01-24 ENCOUNTER — ANESTHESIA EVENT (OUTPATIENT)
Dept: ENDOSCOPY | Facility: HOSPITAL | Age: 74
End: 2024-01-24
Payer: MEDICARE

## 2024-01-24 ENCOUNTER — ANESTHESIA (OUTPATIENT)
Dept: ENDOSCOPY | Facility: HOSPITAL | Age: 74
End: 2024-01-24
Payer: MEDICARE

## 2024-01-24 VITALS
OXYGEN SATURATION: 97 % | WEIGHT: 261 LBS | HEART RATE: 93 BPM | DIASTOLIC BLOOD PRESSURE: 69 MMHG | HEIGHT: 66 IN | BODY MASS INDEX: 41.95 KG/M2 | TEMPERATURE: 98 F | SYSTOLIC BLOOD PRESSURE: 126 MMHG | RESPIRATION RATE: 17 BRPM

## 2024-01-24 DIAGNOSIS — C16.9 GASTRIC ADENOCARCINOMA: Primary | ICD-10-CM

## 2024-01-24 DIAGNOSIS — Z85.028 HISTORY OF CANCER OF STOMACH: ICD-10-CM

## 2024-01-24 PROCEDURE — 25000003 PHARM REV CODE 250: Mod: HCNC | Performed by: INTERNAL MEDICINE

## 2024-01-24 PROCEDURE — 37000009 HC ANESTHESIA EA ADD 15 MINS: Mod: HCNC | Performed by: INTERNAL MEDICINE

## 2024-01-24 PROCEDURE — 37000008 HC ANESTHESIA 1ST 15 MINUTES: Mod: HCNC | Performed by: INTERNAL MEDICINE

## 2024-01-24 PROCEDURE — D9220A PRA ANESTHESIA: Mod: HCNC,ANES,, | Performed by: ANESTHESIOLOGY

## 2024-01-24 PROCEDURE — D9220A PRA ANESTHESIA: Mod: HCNC,CRNA,, | Performed by: NURSE ANESTHETIST, CERTIFIED REGISTERED

## 2024-01-24 PROCEDURE — 63600175 PHARM REV CODE 636 W HCPCS: Mod: HCNC | Performed by: NURSE ANESTHETIST, CERTIFIED REGISTERED

## 2024-01-24 PROCEDURE — 43235 EGD DIAGNOSTIC BRUSH WASH: CPT | Mod: 52,HCNC,, | Performed by: INTERNAL MEDICINE

## 2024-01-24 PROCEDURE — 43235 EGD DIAGNOSTIC BRUSH WASH: CPT | Mod: 74,HCNC | Performed by: INTERNAL MEDICINE

## 2024-01-24 RX ORDER — SODIUM CHLORIDE 0.9 % (FLUSH) 0.9 %
10 SYRINGE (ML) INJECTION
Status: ACTIVE | OUTPATIENT
Start: 2024-01-24

## 2024-01-24 RX ORDER — HYDROMORPHONE HYDROCHLORIDE 1 MG/ML
0.2 INJECTION, SOLUTION INTRAMUSCULAR; INTRAVENOUS; SUBCUTANEOUS EVERY 5 MIN PRN
Status: DISCONTINUED | OUTPATIENT
Start: 2024-01-24 | End: 2024-01-24 | Stop reason: HOSPADM

## 2024-01-24 RX ORDER — SODIUM CHLORIDE 0.9 % (FLUSH) 0.9 %
3 SYRINGE (ML) INJECTION
Status: DISCONTINUED | OUTPATIENT
Start: 2024-01-24 | End: 2024-01-24 | Stop reason: HOSPADM

## 2024-01-24 RX ORDER — SODIUM CHLORIDE 9 MG/ML
INJECTION, SOLUTION INTRAVENOUS CONTINUOUS
Status: ACTIVE | OUTPATIENT
Start: 2024-01-24

## 2024-01-24 RX ORDER — FENTANYL CITRATE 50 UG/ML
25 INJECTION, SOLUTION INTRAMUSCULAR; INTRAVENOUS EVERY 5 MIN PRN
Status: DISCONTINUED | OUTPATIENT
Start: 2024-01-24 | End: 2024-01-24 | Stop reason: HOSPADM

## 2024-01-24 RX ORDER — PROPOFOL 10 MG/ML
VIAL (ML) INTRAVENOUS
Status: DISCONTINUED | OUTPATIENT
Start: 2024-01-24 | End: 2024-01-24

## 2024-01-24 RX ORDER — PROCHLORPERAZINE EDISYLATE 5 MG/ML
5 INJECTION INTRAMUSCULAR; INTRAVENOUS EVERY 30 MIN PRN
Status: DISCONTINUED | OUTPATIENT
Start: 2024-01-24 | End: 2024-01-24 | Stop reason: HOSPADM

## 2024-01-24 RX ORDER — DIPHENHYDRAMINE HYDROCHLORIDE 50 MG/ML
25 INJECTION INTRAMUSCULAR; INTRAVENOUS EVERY 6 HOURS PRN
Status: DISCONTINUED | OUTPATIENT
Start: 2024-01-24 | End: 2024-01-24 | Stop reason: HOSPADM

## 2024-01-24 RX ORDER — ONDANSETRON HYDROCHLORIDE 2 MG/ML
INJECTION, SOLUTION INTRAVENOUS
Status: DISCONTINUED | OUTPATIENT
Start: 2024-01-24 | End: 2024-01-24

## 2024-01-24 RX ORDER — FENTANYL CITRATE 50 UG/ML
INJECTION, SOLUTION INTRAMUSCULAR; INTRAVENOUS
Status: DISCONTINUED | OUTPATIENT
Start: 2024-01-24 | End: 2024-01-24

## 2024-01-24 RX ORDER — SODIUM CHLORIDE 9 MG/ML
INJECTION, SOLUTION INTRAVENOUS CONTINUOUS
Status: DISCONTINUED | OUTPATIENT
Start: 2024-01-24 | End: 2024-01-24 | Stop reason: HOSPADM

## 2024-01-24 RX ADMIN — ONDANSETRON 4 MG: 2 INJECTION INTRAMUSCULAR; INTRAVENOUS at 08:01

## 2024-01-24 RX ADMIN — PROPOFOL 50 MG: 10 INJECTION, EMULSION INTRAVENOUS at 08:01

## 2024-01-24 RX ADMIN — FENTANYL CITRATE 50 MCG: 50 INJECTION, SOLUTION INTRAMUSCULAR; INTRAVENOUS at 08:01

## 2024-01-24 RX ADMIN — SODIUM CHLORIDE: 9 INJECTION, SOLUTION INTRAVENOUS at 08:01

## 2024-01-24 RX ADMIN — GLYCOPYRROLATE 0.2 MG: 0.2 INJECTION, SOLUTION INTRAMUSCULAR; INTRAVENOUS at 08:01

## 2024-01-24 NOTE — H&P
History & Physical - Short Stay  Gastroenterology      SUBJECTIVE:     Procedure: EUS    Chief Complaint/Indication for Procedure: Surveillance    History of Present Illness:  Patient is a 73 y.o. female presents for surveillance for gastric cancer S/P ESD.     PTA Medications   Medication Sig    acetaminophen (TYLENOL) 500 MG tablet     amLODIPine (NORVASC) 10 MG tablet TAKE 1 TABLET EVERY DAY    atorvastatin (LIPITOR) 20 MG tablet TAKE 1 TABLET EVERY DAY    B-complex with vitamin C (Z-BEC OR EQUIV) tablet Take 1 tablet by mouth once daily.     CALCIUM CITRATE-VITAMIN D2 ORAL Take 1 tablet by mouth once daily.     citalopram (CELEXA) 10 MG tablet TAKE 1 TABLET EVERY DAY    dextran 70-hypromellose (TEARS) ophthalmic solution Place 1 drop into the right eye every 6 (six) hours.    ELIQUIS 5 mg Tab TAKE 1 TABLET TWICE DAILY    ferrous gluconate (FERGON) 324 MG tablet TAKE 1 TABLET (324 MG TOTAL) BY MOUTH DAILY WITH BREAKFAST.    furosemide (LASIX) 80 MG tablet TAKE 1 TABLET TWICE DAILY (Patient taking differently: Take 80 mg by mouth 2 (two) times a day.)    gabapentin (NEURONTIN) 300 MG capsule Take 2 capsules (600 mg total) by mouth 3 (three) times daily.    LIDOcaine-prilocaine (EMLA) cream Apply topically as needed (prior to port access).    lisinopriL (PRINIVIL,ZESTRIL) 40 MG tablet TAKE 1 TABLET EVERY DAY (Patient taking differently: every evening. TAKE 1 TABLET EVERY NIGHT)    multivitamin with minerals tablet Take 1 tablet by mouth once daily.     oxyCODONE-acetaminophen (PERCOCET) 5-325 mg per tablet Take 1 tablet by mouth every 8 (eight) hours as needed for Pain.    pantoprazole (PROTONIX) 40 MG tablet TAKE 1 TABLET EVERY DAY    semaglutide (OZEMPIC) 1 mg/dose (4 mg/3 mL) INJECT 1 MG INTO THE SKIN EVERY 7 DAYS.       Review of patient's allergies indicates:  No Known Allergies     Past Medical History:   Diagnosis Date    YOUNG (acute kidney injury) 10/15/2021    Anemia     2018    Arthritis     BMI 60.0-69.9,  adult     Cataract     Chronic diastolic congestive heart failure 01/27/2021    Deep vein thrombosis     Depression     Dry eye syndrome     DVT of deep femoral vein, right 05/29/2023    Gastric adenocarcinoma 05/25/2021    GERD (gastroesophageal reflux disease)     Glaucoma suspect     History of gastric ulcer     History of psychiatric hospitalization     Hx of psychiatric care     Celexa, no recall of charted Effexor, Lexapro, Desyrel prescriptions    Hyperlipidemia     Hypertension     Hypothyroidism     Morbid obesity     Neuromuscular disorder     Sleep apnea     Thyroid disease      Past Surgical History:   Procedure Laterality Date    APPENDECTOMY      CARDIAC SURGERY      CATARACT EXTRACTION W/  INTRAOCULAR LENS IMPLANT Bilateral     MFIOL OU    CORONARY ANGIOGRAPHY Bilateral 02/24/2021    Procedure: ANGIOGRAM, CORONARY ARTERY;  Surgeon: Cresencio Ridley MD;  Location: [facility] CATH LAB;  Service: Cardiology;  Laterality: Bilateral;  Covid test needed - ok per Danay SSCU. Pt. w/o transportation or family    ENDOSCOPIC ULTRASOUND OF UPPER GASTROINTESTINAL TRACT N/A 03/17/2021    Procedure: ULTRASOUND, UPPER GI TRACT, ENDOSCOPIC;  Surgeon: Gerald Anaya MD;  Location: [facility] (2ND FLR);  Service: Endoscopy;  Laterality: N/A;  Pt unable to get a ride for swab. Will do rapid test at 8:30 - ttr    ENDOSCOPIC ULTRASOUND OF UPPER GASTROINTESTINAL TRACT N/A 01/13/2022    Procedure: ULTRASOUND, UPPER GI TRACT, ENDOSCOPIC;  Surgeon: Kevin Carballo MD;  Location: [facility] (2ND FLR);  Service: Endoscopy;  Laterality: N/A;  blood thinner approval received, see telephone encounter 1/7/22-BB  rapid  instructions given verbally and sent to address on file in pt's chart-BB    ENDOSCOPIC ULTRASOUND OF UPPER GASTROINTESTINAL TRACT N/A 10/11/2022    Procedure: ULTRASOUND, UPPER GI TRACT, ENDOSCOPIC;  Surgeon: Dequan Ridley MD;  Location: [facility] ENDO (2ND FLR);  Service: Endoscopy;  Laterality: N/A;     ESOPHAGOGASTRODUODENOSCOPY N/A 02/05/2021    Procedure: EGD (ESOPHAGOGASTRODUODENOSCOPY);  Surgeon: Matthew Hernadez MD;  Location: [facility] (2ND FLR);  Service: Endoscopy;  Laterality: N/A;  BMI-58    Wt: 361#     COVID test at [facility] on 2/2-GT    ESOPHAGOGASTRODUODENOSCOPY N/A 03/17/2021    Procedure: EGD (ESOPHAGOGASTRODUODENOSCOPY);  Surgeon: Gerald Anaya MD;  Location: [facility] ENDO (2ND FLR);  Service: Endoscopy;  Laterality: N/A;    ESOPHAGOGASTRODUODENOSCOPY N/A 05/25/2021    Procedure: EGD (ESOPHAGOGASTRODUODENOSCOPY);  Surgeon: Kevin Carballo MD;  Location: [facility] (2ND FLR);  Service: Endoscopy;  Laterality: N/A;  ESD 2 hours  Full COVID vaccination on file. ttr    ESOPHAGOGASTRODUODENOSCOPY N/A 01/13/2022    Procedure: EGD (ESOPHAGOGASTRODUODENOSCOPY);  Surgeon: Kevin Carballo MD;  Location: [facility] (2ND FLR);  Service: Endoscopy;  Laterality: N/A;  blood thinner approval, see telephone encounter 1/7/22-BB  rapid  instructions given verbally and sent to address on file in pt's chart-BB    ESOPHAGOGASTRODUODENOSCOPY N/A 10/11/2022    Procedure: EGD (ESOPHAGOGASTRODUODENOSCOPY);  Surgeon: Dequan Ridley MD;  Location: [facility] (2ND FLR);  Service: Endoscopy;  Laterality: N/A;  She is do for EGD/EUS now. Personal history of gastric cancer. Ca Coats #[id number].   Thanks,   Kevin Fuentes MD    Pt is fully vaccinated-DS  9/14/22-Approval to hold Davian rec'd from Dr. Pelaez (see telephone encounter 9/14/22)-DS  9/14/22-Ins    EYE SURGERY      HYSTEROSCOPIC POLYPECTOMY OF UTERUS  1/10/2024    Procedure: POLYPECTOMY, UTERUS, HYSTEROSCOPIC;  Surgeon: Pina Pickens MD;  Location: [facility];  Service: OB/GYN;;    HYSTEROSCOPY WITH DILATION AND CURETTAGE OF UTERUS N/A 1/10/2024    Procedure: HYSTEROSCOPY, WITH DILATION AND CURETTAGE OF UTERUS;  Surgeon: Pina Pickens MD;  Location: [facility];  Service: OB/GYN;  Laterality: N/A;    INJECTION OF ANESTHETIC AGENT AROUND NERVE Left 07/10/2018     Procedure: BLOCK, NERVE;  Surgeon: Samantha Vidal MD;  Location: [facility] PAIN MGT;  Service: Pain Management;  Laterality: Left;  Genicular     INJECTION OF ANESTHETIC AGENT AROUND NERVE Left 07/19/2019    Procedure: Block, Nerve NERVE BLOCK GENICULAR WITH PHENOL 6%;  Surgeon: Samantha Vidal MD;  Location: [facility] PAIN MGT;  Service: Pain Management;  Laterality: Left;  NEEDS CONSENT    INSERTION OF TUNNELED CENTRAL VENOUS CATHETER (CVC) WITH SUBCUTANEOUS PORT N/A 07/09/2021    Procedure: INSERTION, PORT-A-CATH;  Surgeon: Mario Paz MD;  Location: [facility] CATH LAB;  Service: Radiology;  Laterality: N/A;  PORT PLACEMENT    RADIOFREQUENCY ABLATION Left 06/19/2020    Procedure: RADIOFREQUENCY ABLATION LEFT GENICULAR;  Surgeon: Tevin Clark MD;  Location: [facility] PAIN MGT;  Service: Pain Management;  Laterality: Left;  Left Genicular RFA    RADIOFREQUENCY ABLATION Left 01/22/2021    Procedure: RADIOFREQUENCY ABLATION LEFT GENICULAR;  Surgeon: Tevin Clark MD;  Location: [facility] PAIN MGT;  Service: Pain Management;  Laterality: Left;    RADIOFREQUENCY ABLATION Left 07/30/2021    Procedure: RADIOFREQUENCY ABLATION, GENICULAR COOLED NEED CONSENT;  Surgeon: Tevin Clark MD;  Location: [facility] PAIN MGT;  Service: Pain Management;  Laterality: Left;    RADIOFREQUENCY ABLATION Left 04/22/2022    Procedure: RADIOFREQUENCY ABLATION, GENICULAR COOLED , LEFT;  Surgeon: Tevin Clark MD;  Location: [facility] PAIN MGT;  Service: Pain Management;  Laterality: Left;    RADIOFREQUENCY ABLATION Left 12/23/2022    Procedure: RADIOFREQUENCY ABLATION LEFT GENICULAR COOLED CLEARED TO HOLD ELIQUIS 3 DAYS  NEEDS CONSENT;  Surgeon: Tevin Clark MD;  Location: [facility] PAIN MGT;  Service: Pain Management;  Laterality: Left;    TONSILLECTOMY       Family History   Problem Relation Age of Onset    No Known Problems Mother     No Known Problems Father     Colon cancer Neg Hx     Esophageal cancer Neg Hx      Social History     Tobacco Use     "Smoking status: Former     Types: Cigarettes     Start date: 2018    Smokeless tobacco: Never    Tobacco comments:     currently smoking <5 cigarettes most days   Substance Use Topics    Alcohol use: Yes     Comment: rarely    Drug use: No       Review of Systems:  Constitutional: no fever or chills  Respiratory: no cough or shortness of breath  Cardiovascular: no chest pain or palpitations    OBJECTIVE:     Vital Signs (Most Recent)       Physical Exam:  General: well developed, well nourished  Lungs:  normal respiratory effort  Heart: regular rate, S1, S2 normal    Laboratory  CBC: No results for input(s): "WBC", "RBC", "HGB", "HCT", "PLT", "MCV", "MCH", "MCHC" in the last 168 hours.  CMP:   Recent Labs   Lab 01/18/24  1213   GLU 86   CALCIUM 9.0   ALBUMIN 3.1*   PROT 6.4      K 4.4   CO2 28      BUN 24*   CREATININE 1.0   ALKPHOS 93   ALT 10   AST 13   BILITOT 0.6     Coagulation:   Recent Labs   Lab 01/18/24  1213   LABPROT 10.2   INR 0.9         Diagnostic Results:      ASSESSMENT/PLAN:     Gastric cancer S/P endoscopic resection surveillance    Plan: EUS    Anesthesia Plan: MAC    ASA Grade: ASA 3 - Patient with moderate systemic disease with functional limitations    The impression and plan was discussed in detail with the patient. All questions have been answered and the patient voices understanding of our plan at this point. The risk of the procedure was discussed in detail which includes but not limited to bleeding, infection, perforation in some cases requiring surgery with its spectrum of complications.   "

## 2024-01-24 NOTE — BRIEF OP NOTE
Discharge Summary/Instructions after an Endoscopic Procedure    Patient Name: Ca Coats  Patient MRN: [id number]  Patient YOB: 1950 Wednesday, January 24, 2024  Kevin Carballo MD    Dear patient,  As a result of recent federal legislation (The Federal Cures Act), you may receive lab or pathology results from your procedure in your [facility] account before your physician is able to contact you. Your physician or their representative will relay the results to you with their recommendations at their soonest availability.  Thank you,    RESTRICTIONS:  During your procedure today, you received medications for sedation.  These medications may affect your judgment, balance and coordination.  Therefore, for 24 hours, you have the following restrictions:     - DO NOT drive a car, operate machinery, make legal/financial decisions, sign important papers or drink alcohol.      ACTIVITY:  Today: no heavy lifting, straining or running due to procedural sedation/anesthesia.  The following day: return to full activity including work.    DIET:  Eat and drink normally unless instructed otherwise.     TREATMENT FOR COMMON SIDE EFFECTS:  - Mild abdominal pain, nausea, belching, bloating or excessive gas:  rest, eat lightly and use a heating pad.  - Sore Throat: treat with throat lozenges and/or gargle with warm salt water.  - Because air was used during the procedure, expelling large amounts of air from your rectum or belching is normal.  - If a bowel prep was taken, you may not have a bowel movement for 1-3 days.  This is normal.      SYMPTOMS TO WATCH FOR AND REPORT TO YOUR PHYSICIAN:  1. Abdominal pain or bloating, other than gas cramps.  2. Chest pain.  3. Back pain.  4. Signs of infection such as: chills or fever occurring within 24 hours after the procedure.  5. Rectal bleeding, which would show as bright red, maroon, or black stools. (A tablespoon of blood from the rectum is not serious, especially if  hemorrhoids are present.)  6. Vomiting.  7. Weakness or dizziness.      GO DIRECTLY TO THE NEAREST EMERGENCY ROOM IF YOU HAVE ANY OF THE FOLLOWING:     Difficulty breathing              Chills and/or fever over 101 F   Persistent vomiting and/or vomiting blood   Severe abdominal pain   Severe chest pain   Black, tarry stools   Bleeding- more than one tablespoon   Any other symptom or condition that you feel may need urgent attention    Your doctor recommends these additional instructions:  If any biopsies were taken, your doctors clinic will contact you in 1 to 2 weeks with any results.    - Discharge patient to home (ambulatory).   - Repeat the upper endoscopic ultrasound at the next available appointment because the preparation was poor. The patient need a 48 hours of liquid diet prior to the procedure.    For questions, problems or results please call your physician - Kevin Carballo MD at Work:  [phone].    [facility], EMERGENCY ROOM PHONE NUMBER: [phone]    IF A COMPLICATION OR EMERGENCY SITUATION ARISES AND YOU ARE UNABLE TO REACH YOUR PHYSICIAN - GO DIRECTLY TO THE EMERGENCY ROOM.

## 2024-01-24 NOTE — ANESTHESIA PREPROCEDURE EVALUATION
01/24/2024  Pre-operative evaluation for Procedure(s) (LRB):  ULTRASOUND, UPPER GI TRACT, ENDOSCOPIC (N/A)  EGD (ESOPHAGOGASTRODUODENOSCOPY) (N/A)    Ca Coats is a 73 y.o. female     Patient Active Problem List   Diagnosis    Primary osteoarthritis of left knee    Obstructive sleep apnea    Chronic knee pain    Left knee DJD    Essential hypertension    Major depressive disorder    BMI 40.0-44.9, adult    Joint pain    Gait instability    At high risk for injury related to fall    Debility    Hypothyroidism    Left knee pain    Osteopenia of neck of left femur    Gastroesophageal reflux disease    Chronic diastolic congestive heart failure    Pure hypercholesterolemia    Tobacco abuse    Abnormal cardiovascular stress test    Gastric adenocarcinoma    Iron deficiency anemia secondary to blood loss (chronic)    Chronic pain    History of DVT (deep vein thrombosis)    Current mild episode of major depressive disorder without prior episode    Immunodeficiency due to conditions classified elsewhere    DVT of deep femoral vein, right    Atherosclerosis of aorta    Nervous    Anemia    Edema    Postural dizziness    Constipation    History of Clostridium difficile infection    Opioid use    Impaired functional mobility, balance, gait, and endurance    Decreased range of motion of left knee    Muscle weakness    s/p hscope/D&C/polypectomy    Dizziness    Neuropathy       Review of patient's allergies indicates:  No Known Allergies    Current Facility-Administered Medications on File Prior to Encounter   Medication Dose Route Frequency Provider Last Rate Last Admin    0.9%  NaCl infusion   Intravenous Continuous Tevin Clark MD   Stopped at 01/13/22 1055     Current Outpatient Medications on File Prior to Encounter   Medication Sig Dispense Refill    acetaminophen (TYLENOL) 500 MG tablet        B-complex with vitamin C (Z-BEC OR EQUIV) tablet Take 1 tablet by mouth once daily.       CALCIUM CITRATE-VITAMIN D2 ORAL Take 1 tablet by mouth once daily.       citalopram (CELEXA) 10 MG tablet TAKE 1 TABLET EVERY DAY 90 tablet 3    dextran 70-hypromellose (TEARS) ophthalmic solution Place 1 drop into the right eye every 6 (six) hours. 30 mL 0    furosemide (LASIX) 80 MG tablet TAKE 1 TABLET TWICE DAILY (Patient taking differently: Take 80 mg by mouth 2 (two) times a day.) 180 tablet 3    LIDOcaine-prilocaine (EMLA) cream Apply topically as needed (prior to port access). 30 g 0    multivitamin with minerals tablet Take 1 tablet by mouth once daily.       pantoprazole (PROTONIX) 40 MG tablet TAKE 1 TABLET EVERY DAY 90 tablet 11       Past Surgical History:   Procedure Laterality Date    APPENDECTOMY      CARDIAC SURGERY      CATARACT EXTRACTION W/  INTRAOCULAR LENS IMPLANT Bilateral     MFIOL OU    CORONARY ANGIOGRAPHY Bilateral 02/24/2021    Procedure: ANGIOGRAM, CORONARY ARTERY;  Surgeon: Cresencio Ridley MD;  Location: [facility] CATH LAB;  Service: Cardiology;  Laterality: Bilateral;  Covid test needed - ok per Danay SSCU. Pt. w/o transportation or family    ENDOSCOPIC ULTRASOUND OF UPPER GASTROINTESTINAL TRACT N/A 03/17/2021    Procedure: ULTRASOUND, UPPER GI TRACT, ENDOSCOPIC;  Surgeon: Gerald Anaya MD;  Location: [facility] ([address] [place] [address]);  Service: Endoscopy;  Laterality: N/A;  Pt unable to get a ride for swab. Will do rapid test at 8:30 - ttr    ENDOSCOPIC ULTRASOUND OF UPPER GASTROINTESTINAL TRACT N/A 01/13/2022    Procedure: ULTRASOUND, UPPER GI TRACT, ENDOSCOPIC;  Surgeon: Kevin Carballo MD;  Location: [facility] ([address] [place] [address]);  Service: Endoscopy;  Laterality: N/A;  blood thinner approval received, see telephone encounter 1/7/22-BB  rapid  instructions given verbally and sent to address on file in pt's chart-BB    ENDOSCOPIC ULTRASOUND OF UPPER GASTROINTESTINAL TRACT N/A 10/11/2022    Procedure: ULTRASOUND,  UPPER GI TRACT, ENDOSCOPIC;  Surgeon: Dequan Ridley MD;  Location: [facility] ENDO (2ND FLR);  Service: Endoscopy;  Laterality: N/A;    ESOPHAGOGASTRODUODENOSCOPY N/A 02/05/2021    Procedure: EGD (ESOPHAGOGASTRODUODENOSCOPY);  Surgeon: Matthew Henradez MD;  Location: [facility] ENDO (2ND FLR);  Service: Endoscopy;  Laterality: N/A;  BMI-58    Wt: 361#     COVID test at [facility] on 2/2-GT    ESOPHAGOGASTRODUODENOSCOPY N/A 03/17/2021    Procedure: EGD (ESOPHAGOGASTRODUODENOSCOPY);  Surgeon: Gerald Anaya MD;  Location: [facility] ENDO (2ND FLR);  Service: Endoscopy;  Laterality: N/A;    ESOPHAGOGASTRODUODENOSCOPY N/A 05/25/2021    Procedure: EGD (ESOPHAGOGASTRODUODENOSCOPY);  Surgeon: Kevin Carballo MD;  Location: [facility] ENDO (2ND FLR);  Service: Endoscopy;  Laterality: N/A;  ESD 2 hours  Full COVID vaccination on file. ttr    ESOPHAGOGASTRODUODENOSCOPY N/A 01/13/2022    Procedure: EGD (ESOPHAGOGASTRODUODENOSCOPY);  Surgeon: Kevin Carballo MD;  Location: [facility] ENDO (2ND FLR);  Service: Endoscopy;  Laterality: N/A;  blood thinner approval, see telephone encounter 1/7/22-BB  rapid  instructions given verbally and sent to address on file in pt's chart-BB    ESOPHAGOGASTRODUODENOSCOPY N/A 10/11/2022    Procedure: EGD (ESOPHAGOGASTRODUODENOSCOPY);  Surgeon: Dequan Ridley MD;  Location: [facility] ENDO (2ND FLR);  Service: Endoscopy;  Laterality: N/A;  She is do for EGD/EUS now. Personal history of gastric cancer. Ca Coats #[id number].   Thanks,   Kevin Fuentes MD    Pt is fully vaccinated-DS  9/14/22-Approval to hold Davian rec'd from Dr. Pelaez (see telephone encounter 9/14/22)-DS  9/14/22-Ins    EYE SURGERY      HYSTEROSCOPIC POLYPECTOMY OF UTERUS  1/10/2024    Procedure: POLYPECTOMY, UTERUS, HYSTEROSCOPIC;  Surgeon: Pina Pickens MD;  Location: [facility];  Service: OB/GYN;;    HYSTEROSCOPY WITH DILATION AND CURETTAGE OF UTERUS N/A 1/10/2024    Procedure: HYSTEROSCOPY, WITH DILATION AND CURETTAGE OF UTERUS;  Surgeon: Celio,  Pina RICHARDS MD;  Location: [facility] OR;  Service: OB/GYN;  Laterality: N/A;    INJECTION OF ANESTHETIC AGENT AROUND NERVE Left 07/10/2018    Procedure: BLOCK, NERVE;  Surgeon: Samantha Vidal MD;  Location: [facility] PAIN MGT;  Service: Pain Management;  Laterality: Left;  Genicular     INJECTION OF ANESTHETIC AGENT AROUND NERVE Left 07/19/2019    Procedure: Block, Nerve NERVE BLOCK GENICULAR WITH PHENOL 6%;  Surgeon: Samantha Vidal MD;  Location: [facility] PAIN MGT;  Service: Pain Management;  Laterality: Left;  NEEDS CONSENT    INSERTION OF TUNNELED CENTRAL VENOUS CATHETER (CVC) WITH SUBCUTANEOUS PORT N/A 07/09/2021    Procedure: INSERTION, PORT-A-CATH;  Surgeon: Mario Paz MD;  Location: [facility] CATH LAB;  Service: Radiology;  Laterality: N/A;  PORT PLACEMENT    RADIOFREQUENCY ABLATION Left 06/19/2020    Procedure: RADIOFREQUENCY ABLATION LEFT GENICULAR;  Surgeon: Tevin Clark MD;  Location: [facility] PAIN MGT;  Service: Pain Management;  Laterality: Left;  Left Genicular RFA    RADIOFREQUENCY ABLATION Left 01/22/2021    Procedure: RADIOFREQUENCY ABLATION LEFT GENICULAR;  Surgeon: Tevin Clark MD;  Location: [facility] PAIN MGT;  Service: Pain Management;  Laterality: Left;    RADIOFREQUENCY ABLATION Left 07/30/2021    Procedure: RADIOFREQUENCY ABLATION, GENICULAR COOLED NEED CONSENT;  Surgeon: Tevin Clark MD;  Location: [facility] PAIN MGT;  Service: Pain Management;  Laterality: Left;    RADIOFREQUENCY ABLATION Left 04/22/2022    Procedure: RADIOFREQUENCY ABLATION, GENICULAR COOLED , LEFT;  Surgeon: Tevin Clark MD;  Location: [facility] PAIN MGT;  Service: Pain Management;  Laterality: Left;    RADIOFREQUENCY ABLATION Left 12/23/2022    Procedure: RADIOFREQUENCY ABLATION LEFT GENICULAR COOLED CLEARED TO HOLD ELIQUIS 3 DAYS  NEEDS CONSENT;  Surgeon: Tevin Clark MD;  Location: [facility] PAIN MGT;  Service: Pain Management;  Laterality: Left;    TONSILLECTOMY         Social History     Socioeconomic History    Marital status:  Single    Number of children: 2   Occupational History     Comment: retired  and [employer]   Tobacco Use    Smoking status: Former     Types: Cigarettes     Start date: 2018    Smokeless tobacco: Never    Tobacco comments:     currently smoking <5 cigarettes most days   Substance and Sexual Activity    Alcohol use: Yes     Comment: rarely    Drug use: No    Sexual activity: Not Currently     Partners: Male   Social History Narrative    2 children (dtr in area, son in [place]) and she has 3 grandkids (in [place]),    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    Originally from [facility]     Social Determinants of Health     Financial Resource Strain: Medium Risk (10/31/2023)    Overall Financial Resource Strain (CARDIA)     Difficulty of Paying Living Expenses: Somewhat hard   Food Insecurity: No Food Insecurity (10/31/2023)    Hunger Vital Sign     Worried About Running Out of Food in the Last Year: Never true     Ran Out of Food in the Last Year: Never true   Transportation Needs: Unmet Transportation Needs (10/31/2023)    PRAPARE - Transportation     Lack of Transportation (Medical): Yes     Lack of Transportation (Non-Medical): Yes   Physical Activity: Inactive (10/31/2023)    Exercise Vital Sign     Days of Exercise per Week: 0 days     Minutes of Exercise per Session: 0 min   Stress: Stress Concern Present (10/31/2023)    [employer] [place] [employer] - Occupational Stress Questionnaire     Feeling of Stress : To some extent   Social Connections: Moderately Isolated (10/31/2023)    Social Connection and Isolation Panel [NHANES]     Frequency of Communication with Friends and Family: Three times a week     Frequency of Social Gatherings with Friends and Family: Three times a week     Attends Muslim Services: 1 to 4 times per year     Active Member of Clubs or Organizations: No     Attends Club or Organization Meetings: Never     Marital Status:    Housing Stability: Low Risk  (10/31/2023)     Housing Stability Vital Sign     Unable to Pay for Housing in the Last Year: No     Number of Places Lived in the Last Year: 1     Unstable Housing in the Last Year: No             Pre-op Assessment    I have reviewed the Patient Summary Reports.     I have reviewed the Nursing Notes. I have reviewed the NPO Status.      Review of Systems  Anesthesia Hx:  No problems with previous Anesthesia                Cardiovascular:     Hypertension       CHF                                 Pulmonary:        Sleep Apnea                Hepatic/GI:     GERD             Musculoskeletal:  Arthritis               Neurological:    Neuromuscular Disease,                                   Endocrine:   Hypothyroidism          Psych:  Psychiatric History                  Physical Exam    Airway:  Mallampati: II   Mouth Opening: Normal  Tongue: Normal    Chest/Lungs:  Normal Respiratory Rate    Heart:  Rhythm: Regular Rhythm        Anesthesia Plan  Type of Anesthesia, risks & benefits discussed:    Anesthesia Type: Gen Natural Airway  Intra-op Monitoring Plan: Standard ASA Monitors  Induction:  IV  Informed Consent: Informed consent signed with the Patient and all parties understand the risks and agree with anesthesia plan.  All questions answered.   ASA Score: 3    Ready For Surgery From Anesthesia Perspective.     .

## 2024-01-24 NOTE — ANESTHESIA POSTPROCEDURE EVALUATION
Anesthesia Post Evaluation    Patient: Ca Coats    Procedure(s) Performed: Procedure(s) (LRB):  ULTRASOUND, UPPER GI TRACT, ENDOSCOPIC (N/A)  EGD (ESOPHAGOGASTRODUODENOSCOPY) (N/A)    Final Anesthesia Type: general      Patient location during evaluation: PACU  Patient participation: Yes- Able to Participate  Level of consciousness: awake and alert  Post-procedure vital signs: reviewed and stable  Pain management: adequate  Airway patency: patent    PONV status at discharge: No PONV  Anesthetic complications: no      Cardiovascular status: blood pressure returned to baseline  Respiratory status: unassisted  Follow-up not needed.              Vitals Value Taken Time   /69 01/24/24 0925   Temp 36.8 °C (98.2 °F) 01/24/24 0935   Pulse 94 01/24/24 0925   Resp 17 01/24/24 0915   SpO2 96 % 01/24/24 0925   Vitals shown include unvalidated device data.      No case tracking events are documented in the log.      Pain/Hector Score: Hector Score: 10 (1/24/2024  9:20 AM)

## 2024-01-24 NOTE — PROVATION PATIENT INSTRUCTIONS
Discharge Summary/Instructions after an Endoscopic Procedure  Patient Name: Ca Coats  Patient MRN: [id number]  Patient YOB: 1950 Wednesday, January 24, 2024  Kevin Carballo MD  Dear patient,  As a result of recent federal legislation (The Federal Cures Act), you may   receive lab or pathology results from your procedure in your [facility]   account before your physician is able to contact you. Your physician or   their representative will relay the results to you with their   recommendations at their soonest availability.  Thank you,  RESTRICTIONS:  During your procedure today, you received medications for sedation.  These   medications may affect your judgment, balance and coordination.  Therefore,   for 24 hours, you have the following restrictions:   - DO NOT drive a car, operate machinery, make legal/financial decisions,   sign important papers or drink alcohol.    ACTIVITY:  Today: no heavy lifting, straining or running due to procedural   sedation/anesthesia.  The following day: return to full activity including work.  DIET:  Eat and drink normally unless instructed otherwise.     TREATMENT FOR COMMON SIDE EFFECTS:  - Mild abdominal pain, nausea, belching, bloating or excessive gas:  rest,   eat lightly and use a heating pad.  - Sore Throat: treat with throat lozenges and/or gargle with warm salt   water.  - Because air was used during the procedure, expelling large amounts of air   from your rectum or belching is normal.  - If a bowel prep was taken, you may not have a bowel movement for 1-3 days.    This is normal.  SYMPTOMS TO WATCH FOR AND REPORT TO YOUR PHYSICIAN:  1. Abdominal pain or bloating, other than gas cramps.  2. Chest pain.  3. Back pain.  4. Signs of infection such as: chills or fever occurring within 24 hours   after the procedure.  5. Rectal bleeding, which would show as bright red, maroon, or black stools.   (A tablespoon of blood from the rectum is not serious,  especially if   hemorrhoids are present.)  6. Vomiting.  7. Weakness or dizziness.  GO DIRECTLY TO THE NEAREST EMERGENCY ROOM IF YOU HAVE ANY OF THE FOLLOWING:      Difficulty breathing              Chills and/or fever over 101 F   Persistent vomiting and/or vomiting blood   Severe abdominal pain   Severe chest pain   Black, tarry stools   Bleeding- more than one tablespoon   Any other symptom or condition that you feel may need urgent attention  Your doctor recommends these additional instructions:  If any biopsies were taken, your doctors clinic will contact you in 1 to 2   weeks with any results.  - Discharge patient to home (ambulatory).   - Repeat the upper endoscopic ultrasound at the next available appointment   because the preparation was poor. The patient need a 48 hours of liquid   diet prior to the procedure.  For questions, problems or results please call your physician - Kevin Carballo MD at Work:  [phone].  [facility] EMERGENCY ROOM PHONE NUMBER: [phone]  IF A COMPLICATION OR EMERGENCY SITUATION ARISES AND YOU ARE UNABLE TO REACH   YOUR PHYSICIAN - GO DIRECTLY TO THE EMERGENCY ROOM.  Kevin Carballo MD  1/24/2024 9:07:15 AM  This report has been verified and signed electronically.  Dear patient,  As a result of recent federal legislation (The Federal Cures Act), you may   receive lab or pathology results from your procedure in your [facility]   account before your physician is able to contact you. Your physician or   their representative will relay the results to you with their   recommendations at their soonest availability.  Thank you,  PROVATION

## 2024-01-24 NOTE — TRANSFER OF CARE
"Anesthesia Transfer of Care Note    Patient: Ca Coats    Procedure(s) Performed: Procedure(s) (LRB):  ULTRASOUND, UPPER GI TRACT, ENDOSCOPIC (N/A)  EGD (ESOPHAGOGASTRODUODENOSCOPY) (N/A)    Patient location: GI    Anesthesia Type: MAC    Transport from OR: Transported from OR on 2-3 L/min O2 by NC with adequate spontaneous ventilation    Post pain: adequate analgesia    Post assessment: no apparent anesthetic complications and tolerated procedure well    Post vital signs: stable    Level of consciousness: awake    Nausea/Vomiting: no nausea/vomiting    Complications: none    Transfer of care protocol was followed      Last vitals: Visit Vitals  BP (!) 145/63 (Patient Position: Lying)   Pulse 72   Temp 36.7 °C (98.1 °F) (Temporal)   Resp 16   Ht 5' 6" (1.676 m)   Wt 118.4 kg (261 lb)   SpO2 97%   Breastfeeding No   BMI 42.13 kg/m²     "

## 2024-01-25 ENCOUNTER — PATIENT MESSAGE (OUTPATIENT)
Dept: ENDOSCOPY | Facility: HOSPITAL | Age: 74
End: 2024-01-25
Payer: MEDICARE

## 2024-01-25 ENCOUNTER — TELEPHONE (OUTPATIENT)
Dept: ENDOSCOPY | Facility: HOSPITAL | Age: 74
End: 2024-01-25
Payer: MEDICARE

## 2024-01-25 ENCOUNTER — TELEPHONE (OUTPATIENT)
Dept: ORTHOPEDICS | Facility: CLINIC | Age: 74
End: 2024-01-25
Payer: MEDICARE

## 2024-01-25 DIAGNOSIS — C16.9 MALIGNANT NEOPLASM OF STOMACH, UNSPECIFIED LOCATION: Primary | ICD-10-CM

## 2024-01-25 LAB
AMPHIPHYSIN AB TITR SER: NEGATIVE {TITER}
ANNOTATION COMMENT IMP: NORMAL
CV2 IGG TITR SER: NEGATIVE {TITER}
GLIAL NUC TYPE 1 AB TITR SER: NEGATIVE {TITER}
HU1 AB TITR SER: NEGATIVE {TITER}
HU2 AB TITR SER IF: NEGATIVE {TITER}
HU3 AB TITR SER: NEGATIVE {TITER}
IMMUNOLOGIST REVIEW: NORMAL
PCA-1 AB TITR SER: NEGATIVE {TITER}
PCA-TR AB TITR SER: NEGATIVE {TITER}
PURKINJE CELL CYTOPLASMIC AB TYPE2: NEGATIVE
VGCC-P/Q BIND AB SER-SCNC: 0 NMOL/L
VGKC AB SER-SCNC: 0 NMOL/L

## 2024-01-25 NOTE — TELEPHONE ENCOUNTER
Spoke to pt, rescheduled class from today 1/25/24 to 2/5 virtually due to Weather. Pt pleased and verbalized understanding.

## 2024-01-25 NOTE — TELEPHONE ENCOUNTER
Spoke to patient to schedule procedure(s) Upper Endoscopy Ultrasound (EUS)       Physician to perform procedure(s) Dr. HENRY Carballo  Date of Procedure (s) 03/05/2024  Arrival Time 10:00 AM  Time of Procedure(s) 11:00 AM   Location of Procedure(s) [address]  Type of Rx Prep sent to patient: Other  Instructions provided to patient via [facility]    Patient was informed on the following information and verbalized understanding. Screening questionnaire reviewed with patient and complete. If procedure requires anesthesia, a responsible adult needs to be present to accompany the patient home, patient cannot drive after receiving anesthesia. Appointment details are tentative, especially check-in time. Patient will receive a prep-op call 7 days prior to confirm check-in time for procedure. If applicable the patient should contact their pharmacy to verify Rx for procedure prep is ready for pick-up. Patient was advised to call the scheduling department at [phone] if pharmacy states no Rx is available. Patient was advised to call the endoscopy scheduling department if any questions or concerns arise.      [facility] Scheduling Department

## 2024-01-25 NOTE — TELEPHONE ENCOUNTER
----- Message from Kianna Gonzalez sent at 1/25/2024  1:03 PM CST -----  Regarding: Appt  Contact: Pt [phone]  Pt calling to cancel Education class for today due to weather. Please call to reschedule [phone]

## 2024-01-26 ENCOUNTER — HOSPITAL ENCOUNTER (OUTPATIENT)
Dept: RADIOLOGY | Facility: OTHER | Age: 74
Discharge: HOME | End: 2024-01-26
Attending: STUDENT IN AN ORGANIZED HEALTH CARE EDUCATION/TRAINING PROGRAM
Payer: MEDICARE

## 2024-01-26 ENCOUNTER — TELEPHONE (OUTPATIENT)
Dept: ORTHOPEDICS | Facility: CLINIC | Age: 74
End: 2024-01-26
Payer: MEDICARE

## 2024-01-26 ENCOUNTER — TELEPHONE (OUTPATIENT)
Dept: NEUROLOGY | Facility: CLINIC | Age: 74
End: 2024-01-26
Payer: MEDICARE

## 2024-01-26 DIAGNOSIS — R42 DIZZINESS AND GIDDINESS: ICD-10-CM

## 2024-01-26 DIAGNOSIS — I72.5 BASILAR ARTERY ANEURYSM: ICD-10-CM

## 2024-01-26 DIAGNOSIS — G89.4 CHRONIC PAIN SYNDROME: ICD-10-CM

## 2024-01-26 DIAGNOSIS — I67.1 MULTIPLE ANEURYSMS OF CEREBRAL ARTERY: Primary | ICD-10-CM

## 2024-01-26 PROCEDURE — 70551 MRI BRAIN STEM W/O DYE: CPT | Mod: 26,HCNC,, | Performed by: RADIOLOGY

## 2024-01-26 PROCEDURE — 70551 MRI BRAIN STEM W/O DYE: CPT | Mod: TC,HCNC

## 2024-01-26 PROCEDURE — 70544 MR ANGIOGRAPHY HEAD W/O DYE: CPT | Mod: 59,TC,HCNC

## 2024-01-26 PROCEDURE — 70547 MR ANGIOGRAPHY NECK W/O DYE: CPT | Mod: TC,HCNC

## 2024-01-26 RX ORDER — OXYCODONE AND ACETAMINOPHEN 5; 325 MG/1; MG/1
1 TABLET ORAL EVERY 8 HOURS PRN
Qty: 90 TABLET | Refills: 0 | Status: SHIPPED | OUTPATIENT
Start: 2024-01-26 | End: 2024-03-01 | Stop reason: SDUPTHER

## 2024-01-26 NOTE — TELEPHONE ENCOUNTER
----- Message from Irina Torrez sent at 1/26/2024 10:01 AM CST -----  Regarding: refill  Name of caller: natalie       What is the requesting detail: requesting refill for oxyCODONE-acetaminophen (PERCOCET) 5-325 mg per tablet. Please advise     [facility]          Can the clinic reply by [facility]:       What number to call back: [phone]

## 2024-01-26 NOTE — TELEPHONE ENCOUNTER
I spoke with Ms Coats over the phone regarding her MRI/MRA results.  There are no areas of remote infarct, large vessel occlusion, or high-garde stenosis.  However, she has 3 brain aneurysms (L MCA, L PCOM, and basilar tip), largest of which is 12 mm.  I discussed the potential risks such as brain hemorrhage.   I have sent an urgent referral to Neurosurgery (neuro intervention) for evaluation and management.  I'd strongly recommend holding the upcoming knee surgery until this is addressed or she is at least evaluated by neuro interventionalist.  She expresses understanding and agreement with the plan.      Mary Staton MD

## 2024-01-26 NOTE — TELEPHONE ENCOUNTER
Called and spoke to pt regarding surgery, advised pt that surgery is canceled. Pt understood conversation with no further questions.

## 2024-01-26 NOTE — TELEPHONE ENCOUNTER
Patient requesting refill on oxyCODONE-acetaminophen (PERCOCET) 5-325 mg   Last office visit 11/01/23   shows last refill on 12/27/23  Patient does have a pain contract on file with [facility]  Patient last UDS 08/16/22 was consistent with current therapy        CODEINE   Not Detected Not Detected   MORPHINE   Not Detected Not Detected   6-ACETYLMORPHINE   Not Detected Not Detected   OXYCODONE   Present Present   NOROYXCODONE   Present Present   OXYMORPHONE   Present Not Detected   NOROXYMORPHONE   Present Not Detected   HYDROCODONE   Not Detected Not Detected   NORHYDROCODONE   Not Detected Not Detected   HYDROMORPHONE   Not Detected Not Detected   BUPRENORPHINE   Not Detected Not Detected   NORUBPRENORPHINE   Not Detected Not Detected   FENTANYL   Not Detected Not Detected   NORFENTANYL   Not Detected Not Detected   MEPERIDINE METABOLITE   Not Detected Not Detected   TAPENTADOL   Not Detected Not Detected   TAPENTADOL-O-SULF   Not Detected Not Detected   METHADONE   Not Detected Not Detected   TRAMADOL   Not Detected Not Detected   AMPHETAMINE   Not Detected Not Detected   METHAMPHETAMINE   Not Detected Not Detected   MDMA- ECSTASY   Not Detected Not Detected   MDA   Not Detected Not Detected   MDEA- Genoveva   Not Detected Not Detected   METHYLPHENIDATE   Not Detected Not Detected   PHENTERMINE   Not Detected Not Detected   BENZOYLECGONINE   Not Detected Not Detected   ALPRAZOLAM   Not Detected Not Detected   ALPHA-OH-ALPRAZOLAM   Not Detected Not Detected   CLONAZEPAM   Not Detected Not Detected   7-AMINOCLONAZEPAM   Not Detected Not Detected   DIAZEPAM   Not Detected Not Detected   NORDIAZEPAM   Not Detected Not Detected   OXAZEPAM   Not Detected Not Detected   TEMAZEPAM   Not Detected Not Detected   Lorazepam   Not Detected Not Detected   MIDAZOLAM   Not Detected Not Detected   ZOLPIDEM   Not Detected Not Detected   BARBITURATES   Not Detected Not Detected   Creatinine, Urine 20.0  - 400.0 mg/dL 205.6 102.0   ETHYL GLUCURONIDE   Present Not Detected   MARIJUANA METABOLITE   Not Detected Not Detected   PCP   Not Detected Not Detected   CARISOPRODOL   Not Detected Not Detected CM   Comment: The carisoprodol immunoassay has cross-reactivity to  carisoprodol and meprobamate.   Naloxone   Not Detected     Gabapentin   Present     Pregabalin   Not Detected     Alpha-OH-Midazolam   Not Detected     Zolpidem Metabolite   Not Detected     PAIN MANAGEMENT DRUG PANEL   See Below See Below CM

## 2024-01-30 ENCOUNTER — TELEPHONE (OUTPATIENT)
Dept: NEUROSURGERY | Facility: CLINIC | Age: 74
End: 2024-01-30
Payer: MEDICARE

## 2024-01-30 NOTE — TELEPHONE ENCOUNTER
Return call to pt. Informed that the earliest available appt that we have in the morning will be on 2/26. Pt refused. Aksed if pt would like to change to a virtual visit. PT said yes. Instructed on how to do a virtual visit to pt/ friend/ family member. Pt/friend/ family member understanding.

## 2024-01-30 NOTE — TELEPHONE ENCOUNTER
----- Message from Mei Zoya Goodson sent at 1/30/2024 11:43 AM CST -----  Regarding: R/S Appt 2-6-24  Contact: Pt @ [phone]  Pt is calling to speak to someone in the office to r/s her appt that she is currently scheduled for; no available appts in Epic. Please call to advise. Thanks.         Patient's DX: EVAL FOR MULTIPLE AVM SEEN ON MRI/MRA/PER DR. NORBERT MARIN/IN[facility] MRI/MRA         Additional Info: Pt does not have transportation for that day and she prefers morning appts

## 2024-01-31 ENCOUNTER — TELEPHONE (OUTPATIENT)
Dept: NEUROSURGERY | Facility: CLINIC | Age: 74
End: 2024-01-31
Payer: MEDICARE

## 2024-01-31 NOTE — TELEPHONE ENCOUNTER
"----- Message from Jagruti Ayers sent at 1/31/2024 11:48 AM CST -----  Regarding: pt advice  Contact: [phone]  Name Of Caller: Lillian(daughter)     Contact Preference?: [phone]     What is the nature of the call?: requesting a call back regarding they would like to set up appt as virtual for 2.6     Additional Notes:    "Thank you for all that you do for our patients"        "

## 2024-01-31 NOTE — TELEPHONE ENCOUNTER
Returned call to pt's daughter. Informed that the appt is changed to [facility]. Pt asked if Dr. Simon could call her instead of her mom. Explained that Dr. Simon will not call them and pt need to present at the visit. Instructed how to do virtual visit. Daughter verbalized understanding.

## 2024-01-31 NOTE — TELEPHONE ENCOUNTER
----- Message from Maricel Sheridan sent at 1/31/2024  8:55 AM CST -----  Regarding: Appt  Contact: Pt [phone]  Pt is calling to state she will keep appt on 2/6 @ 1:30 please call

## 2024-01-31 NOTE — TELEPHONE ENCOUNTER
Returned call to pt. Pt would like to switch back to an in-person visit. Informed that I will change it to in person. Pt verbalized understanding.

## 2024-02-03 NOTE — TELEPHONE ENCOUNTER
No care due was identified.  [facility] Embedded Care Due Messages. Reference number: [id number].   2/03/2024 2:22:38 AM CST

## 2024-02-04 NOTE — TELEPHONE ENCOUNTER
Refill Routing Note   Medication(s) are not appropriate for processing by [facility] for the following reason(s):        Other    [facility] action(s):  Defer               Appointments  past 12m or future 3m with PCP    Date Provider   Last Visit   5/29/2023 Lei Garcia MD   Next Visit   6/6/2024 Lei Garcia MD   ED visits in past 90 days: 0        Note composed:11:46 PM 02/03/2024

## 2024-02-05 RX ORDER — PANTOPRAZOLE SODIUM 40 MG/1
TABLET, DELAYED RELEASE ORAL
Qty: 90 TABLET | Refills: 1 | Status: SHIPPED | OUTPATIENT
Start: 2024-02-05 | End: 2024-06-18

## 2024-02-06 ENCOUNTER — OFFICE VISIT (OUTPATIENT)
Dept: NEUROSURGERY | Facility: CLINIC | Age: 74
End: 2024-02-06
Payer: MEDICARE

## 2024-02-06 ENCOUNTER — TELEPHONE (OUTPATIENT)
Dept: NEUROLOGY | Facility: CLINIC | Age: 74
End: 2024-02-06

## 2024-02-06 ENCOUNTER — TELEPHONE (OUTPATIENT)
Dept: NEUROSURGERY | Facility: CLINIC | Age: 74
End: 2024-02-06

## 2024-02-06 DIAGNOSIS — I67.1 CEREBRAL ANEURYSM, NONRUPTURED: Primary | ICD-10-CM

## 2024-02-06 DIAGNOSIS — I72.5 BASILAR ARTERY ANEURYSM: ICD-10-CM

## 2024-02-06 DIAGNOSIS — I67.1 MULTIPLE ANEURYSMS OF CEREBRAL ARTERY: ICD-10-CM

## 2024-02-06 PROCEDURE — 99204 OFFICE O/P NEW MOD 45 MIN: CPT | Mod: HCNC,95,, | Performed by: NEUROLOGICAL SURGERY

## 2024-02-06 PROCEDURE — 4010F ACE/ARB THERAPY RXD/TAKEN: CPT | Mod: HCNC,CPTII,95, | Performed by: NEUROLOGICAL SURGERY

## 2024-02-06 PROCEDURE — 3044F HG A1C LEVEL LT 7.0%: CPT | Mod: HCNC,CPTII,95, | Performed by: NEUROLOGICAL SURGERY

## 2024-02-06 NOTE — PROGRESS NOTES
Neurosurgery  History & Physical    SUBJECTIVE:     Chief Complaint:  Multiple cerebral aneurysms    History of Present Illness:  73-year-old female, referred to me by Dr. Staton, past medical history of hypertension, obstructive sleep apnea on CPAP, suggestive heart failure, obesity, history of gastric cancer status post chemoradiation and DVT in currently on Eliquis.  She also has a current smoker she was initially referred to Neurology for workup for dizziness.  She had been complaining of vertigo multiple times a day for the past several months, associated with shortness of breath, palpitation, nausea, headache, and feeling faint.  She felt like the room is spinning but at the same time denied any chest pain or loss of consciousness.  She felt the episodes of normally when she was bending over standing up from sitting position, but they did occur to her while she was sitting as well.  They did not occur when she was lying down in bed.  She patient noted that the dizziness resolved after sitting still for about 10-15 minutes.  Denied any recent or remote trauma.  No known history of stroke.  She says she notes she feels dizziness much of the time she feels on the right temple.  She notes some nausea, denies any headache.  She has a 55 pack-year history of smoking.  She occasionally drinks alcohol, denies any illicit drug use.  Unclear of any family history of aneurysms or stroke.  She believes he had a brother who may have had a stroke but he is long since passed away.  This was a virtual audio visual visit.    Review of patient's allergies indicates:  No Known Allergies    Current Outpatient Medications   Medication Sig Dispense Refill    acetaminophen (TYLENOL) 500 MG tablet       amLODIPine (NORVASC) 10 MG tablet TAKE 1 TABLET EVERY DAY 90 tablet 1    atorvastatin (LIPITOR) 20 MG tablet TAKE 1 TABLET EVERY DAY 90 tablet 2    B-complex with vitamin C (Z-BEC OR EQUIV) tablet Take 1 tablet by mouth once daily.        CALCIUM CITRATE-VITAMIN D2 ORAL Take 1 tablet by mouth once daily.       citalopram (CELEXA) 10 MG tablet TAKE 1 TABLET EVERY DAY 90 tablet 3    dextran 70-hypromellose (TEARS) ophthalmic solution Place 1 drop into the right eye every 6 (six) hours. 30 mL 0    ELIQUIS 5 mg Tab TAKE 1 TABLET TWICE DAILY 180 tablet 2    ferrous gluconate (FERGON) 324 MG tablet TAKE 1 TABLET (324 MG TOTAL) BY MOUTH DAILY WITH BREAKFAST. 90 tablet 3    furosemide (LASIX) 80 MG tablet TAKE 1 TABLET TWICE DAILY (Patient taking differently: Take 80 mg by mouth 2 (two) times a day.) 180 tablet 3    gabapentin (NEURONTIN) 300 MG capsule Take 2 capsules (600 mg total) by mouth 3 (three) times daily. 540 capsule 2    LIDOcaine-prilocaine (EMLA) cream Apply topically as needed (prior to port access). 30 g 0    lisinopriL (PRINIVIL,ZESTRIL) 40 MG tablet TAKE 1 TABLET EVERY DAY (Patient taking differently: every evening. TAKE 1 TABLET EVERY NIGHT) 90 tablet 1    multivitamin with minerals tablet Take 1 tablet by mouth once daily.       oxyCODONE-acetaminophen (PERCOCET) 5-325 mg per tablet Take 1 tablet by mouth every 8 (eight) hours as needed for Pain. 90 tablet 0    pantoprazole (PROTONIX) 40 MG tablet TAKE 1 TABLET EVERY DAY 90 tablet 1    semaglutide (OZEMPIC) 1 mg/dose (4 mg/3 mL) INJECT 1 MG INTO THE SKIN EVERY 7 DAYS. 9 each 11     No current facility-administered medications for this visit.     Facility-Administered Medications Ordered in Other Visits   Medication Dose Route Frequency Provider Last Rate Last Admin    0.9%  NaCl infusion   Intravenous Continuous Tevin Clark MD   Stopped at 01/13/22 1055    0.9%  NaCl infusion   Intravenous Continuous Kevin Carballo MD        sodium chloride 0.9% flush 10 mL  10 mL Intravenous PRN Kevin Carballo MD           Past Medical History:   Diagnosis Date    YOUNG (acute kidney injury) 10/15/2021    Anemia     2018    Arthritis     BMI 60.0-69.9, adult     Cataract     Chronic  diastolic congestive heart failure 01/27/2021    Deep vein thrombosis     Depression     Dry eye syndrome     DVT of deep femoral vein, right 05/29/2023    Gastric adenocarcinoma 05/25/2021    GERD (gastroesophageal reflux disease)     Glaucoma suspect     History of gastric ulcer     History of psychiatric hospitalization     Hx of psychiatric care     Celexa, no recall of charted Effexor, Lexapro, Desyrel prescriptions    Hyperlipidemia     Hypertension     Hypothyroidism     Morbid obesity     Neuromuscular disorder     Sleep apnea     Thyroid disease      Past Surgical History:   Procedure Laterality Date    APPENDECTOMY      CARDIAC SURGERY      CATARACT EXTRACTION W/  INTRAOCULAR LENS IMPLANT Bilateral     MFIOL OU    CORONARY ANGIOGRAPHY Bilateral 02/24/2021    Procedure: ANGIOGRAM, CORONARY ARTERY;  Surgeon: Cresencio Ridley MD;  Location: [facility] CATH LAB;  Service: Cardiology;  Laterality: Bilateral;  Covid test needed - ok per Danay SSCU. Pt. w/o transportation or family    ENDOSCOPIC ULTRASOUND OF UPPER GASTROINTESTINAL TRACT N/A 03/17/2021    Procedure: ULTRASOUND, UPPER GI TRACT, ENDOSCOPIC;  Surgeon: Gerald Anaya MD;  Location: [facility] (2ND FLR);  Service: Endoscopy;  Laterality: N/A;  Pt unable to get a ride for swab. Will do rapid test at 8:30 - ttr    ENDOSCOPIC ULTRASOUND OF UPPER GASTROINTESTINAL TRACT N/A 01/13/2022    Procedure: ULTRASOUND, UPPER GI TRACT, ENDOSCOPIC;  Surgeon: Kevin Carballo MD;  Location: [facility] (2ND FLR);  Service: Endoscopy;  Laterality: N/A;  blood thinner approval received, see telephone encounter 1/7/22-BB  rapid  instructions given verbally and sent to address on file in pt's chart-BB    ENDOSCOPIC ULTRASOUND OF UPPER GASTROINTESTINAL TRACT N/A 10/11/2022    Procedure: ULTRASOUND, UPPER GI TRACT, ENDOSCOPIC;  Surgeon: Dequan Ridley MD;  Location: [facility] ENDO (2ND FLR);  Service: Endoscopy;  Laterality: N/A;    ENDOSCOPIC ULTRASOUND OF UPPER GASTROINTESTINAL  TRACT N/A 1/24/2024    Procedure: ULTRASOUND, UPPER GI TRACT, ENDOSCOPIC;  Surgeon: Kevin Carballo MD;  Location: [facility] ENDO (2ND FLR);  Service: Endoscopy;  Laterality: N/A;    ESOPHAGOGASTRODUODENOSCOPY N/A 02/05/2021    Procedure: EGD (ESOPHAGOGASTRODUODENOSCOPY);  Surgeon: Matthew Hernadez MD;  Location: [facility] ENDO (2ND FLR);  Service: Endoscopy;  Laterality: N/A;  BMI-58    Wt: 361#     COVID test at [facility] on 2/2-GT    ESOPHAGOGASTRODUODENOSCOPY N/A 03/17/2021    Procedure: EGD (ESOPHAGOGASTRODUODENOSCOPY);  Surgeon: Gerald Anaya MD;  Location: [facility] ENDO (2ND FLR);  Service: Endoscopy;  Laterality: N/A;    ESOPHAGOGASTRODUODENOSCOPY N/A 05/25/2021    Procedure: EGD (ESOPHAGOGASTRODUODENOSCOPY);  Surgeon: Kevin Carballo MD;  Location: [facility] ENDO (2ND FLR);  Service: Endoscopy;  Laterality: N/A;  ESD 2 hours  Full COVID vaccination on file. ttr    ESOPHAGOGASTRODUODENOSCOPY N/A 01/13/2022    Procedure: EGD (ESOPHAGOGASTRODUODENOSCOPY);  Surgeon: Kevin Carballo MD;  Location: [facility] ENDO (2ND FLR);  Service: Endoscopy;  Laterality: N/A;  blood thinner approval, see telephone encounter 1/7/22-BB  rapid  instructions given verbally and sent to address on file in pt's chart-BB    ESOPHAGOGASTRODUODENOSCOPY N/A 10/11/2022    Procedure: EGD (ESOPHAGOGASTRODUODENOSCOPY);  Surgeon: Dequan Ridley MD;  Location: [facility] ENDO (2ND FLR);  Service: Endoscopy;  Laterality: N/A;  She is do for EGD/EUS now. Personal history of gastric cancer. Ca Coats #[id number].   Thanks,   Kevin Fuentes MD    Pt is fully vaccinated-DS  9/14/22-Approval to hold Eliquamerico rec'd from Dr. Pelaez (see telephone encounter 9/14/22)-DS  9/14/22-Ins    ESOPHAGOGASTRODUODENOSCOPY N/A 1/24/2024    Procedure: EGD (ESOPHAGOGASTRODUODENOSCOPY);  Surgeon: Kevin Carballo MD;  Location: [facility] ([address] [place] [address]);  Service: Endoscopy;  Laterality: N/A;  12/8/23: instructions sent via portal. eliquis approval from MD Pelaez in telephone encounter  (12/12/23) -GD  1/9-precall complete-MS    EYE SURGERY      HYSTEROSCOPIC POLYPECTOMY OF UTERUS  1/10/2024    Procedure: POLYPECTOMY, UTERUS, HYSTEROSCOPIC;  Surgeon: Pina Pickens MD;  Location: [facility] OR;  Service: OB/GYN;;    HYSTEROSCOPY WITH DILATION AND CURETTAGE OF UTERUS N/A 1/10/2024    Procedure: HYSTEROSCOPY, WITH DILATION AND CURETTAGE OF UTERUS;  Surgeon: Pina Pickens MD;  Location: [facility] OR;  Service: OB/GYN;  Laterality: N/A;    INJECTION OF ANESTHETIC AGENT AROUND NERVE Left 07/10/2018    Procedure: BLOCK, NERVE;  Surgeon: Samantha Vidal MD;  Location: [facility] PAIN MGT;  Service: Pain Management;  Laterality: Left;  Genicular     INJECTION OF ANESTHETIC AGENT AROUND NERVE Left 07/19/2019    Procedure: Block, Nerve NERVE BLOCK GENICULAR WITH PHENOL 6%;  Surgeon: Samantha Vidal MD;  Location: [facility] PAIN MGT;  Service: Pain Management;  Laterality: Left;  NEEDS CONSENT    INSERTION OF TUNNELED CENTRAL VENOUS CATHETER (CVC) WITH SUBCUTANEOUS PORT N/A 07/09/2021    Procedure: INSERTION, PORT-A-CATH;  Surgeon: Mario Paz MD;  Location: [facility] CATH LAB;  Service: Radiology;  Laterality: N/A;  PORT PLACEMENT    RADIOFREQUENCY ABLATION Left 06/19/2020    Procedure: RADIOFREQUENCY ABLATION LEFT GENICULAR;  Surgeon: Tevin Clark MD;  Location: [facility] PAIN MGT;  Service: Pain Management;  Laterality: Left;  Left Genicular RFA    RADIOFREQUENCY ABLATION Left 01/22/2021    Procedure: RADIOFREQUENCY ABLATION LEFT GENICULAR;  Surgeon: Tevin Clark MD;  Location: [facility] PAIN MGT;  Service: Pain Management;  Laterality: Left;    RADIOFREQUENCY ABLATION Left 07/30/2021    Procedure: RADIOFREQUENCY ABLATION, GENICULAR COOLED NEED CONSENT;  Surgeon: Tevin Clark MD;  Location: [facility] PAIN MGT;  Service: Pain Management;  Laterality: Left;    RADIOFREQUENCY ABLATION Left 04/22/2022    Procedure: RADIOFREQUENCY ABLATION, GENICULAR COOLED , LEFT;  Surgeon: Tevin Clark MD;  Location: [facility] PAIN MGT;   Service: Pain Management;  Laterality: Left;    RADIOFREQUENCY ABLATION Left 12/23/2022    Procedure: RADIOFREQUENCY ABLATION LEFT GENICULAR COOLED CLEARED TO HOLD ELIQUIS 3 DAYS  NEEDS CONSENT;  Surgeon: Tevin Clark MD;  Location: [facility];  Service: Pain Management;  Laterality: Left;    TONSILLECTOMY       Family History       Problem Relation (Age of Onset)    No Known Problems Mother, Father          Social History     Socioeconomic History    Marital status: Single    Number of children: 2   Occupational History     Comment: retired  and cook   Tobacco Use    Smoking status: Former     Types: Cigarettes     Start date: 2018    Smokeless tobacco: Never    Tobacco comments:     currently smoking <5 cigarettes most days   Substance and Sexual Activity    Alcohol use: Yes     Comment: rarely    Drug use: No    Sexual activity: Not Currently     Partners: Male   Social History Narrative    2 children (dtr in area, son in [place]) and she has 3 grandkids (in [place]),    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    Originally from [facility]     Social Determinants of Health     Financial Resource Strain: Medium Risk (10/31/2023)    Overall Financial Resource Strain (CARDIA)     Difficulty of Paying Living Expenses: Somewhat hard   Food Insecurity: No Food Insecurity (10/31/2023)    Hunger Vital Sign     Worried About Running Out of Food in the Last Year: Never true     Ran Out of Food in the Last Year: Never true   Transportation Needs: Unmet Transportation Needs (10/31/2023)    PRAPARE - Transportation     Lack of Transportation (Medical): Yes     Lack of Transportation (Non-Medical): Yes   Physical Activity: Inactive (10/31/2023)    Exercise Vital Sign     Days of Exercise per Week: 0 days     Minutes of Exercise per Session: 0 min   Stress: Stress Concern Present (10/31/2023)    [employer] - Occupational Stress Questionnaire     Feeling of Stress : To some extent    Social Connections: Moderately Isolated (10/31/2023)    Social Connection and Isolation Panel [NHANES]     Frequency of Communication with Friends and Family: Three times a week     Frequency of Social Gatherings with Friends and Family: Three times a week     Attends Taoism Services: 1 to 4 times per year     Active Member of Clubs or Organizations: No     Attends Club or Organization Meetings: Never     Marital Status:    Housing Stability: Low Risk  (10/31/2023)    Housing Stability Vital Sign     Unable to Pay for Housing in the Last Year: No     Number of Places Lived in the Last Year: 1     Unstable Housing in the Last Year: No       Review of Systems    OBJECTIVE:     Vital Signs     There is no height or weight on file to calculate BMI.      Neurosurgery Physical Exam  On virtual audio visual visit   Head is normocephalic atraumatic   Neck is grossly supple  No appreciable labored breathing   Admitting appears to be nontender   Patient is able to move extremities     On virtual audio visual visit the patient's speech is fluent, goal directed without any noted dysarthria or aphasia   Unable to evaluate pupils on virtual audio visual visit   Face is symmetric   Tongue is midline  Unable to evaluate palate   Hearing appears to be intact on virtual audio visual visit to voice   Patient able to move both upper and lower extremities without any gross motor asymmetry on virtual audio visual visit     Diagnostic Results:  I personally reviewed patient's Diagnostic Imaging.  MRA of the brain with what appears to be a 12 mm left MCA bifurcation aneurysms.  There is a excrescence at the basilar apex approximately 6 mm with some irregularity into the left P1 segment.  There also is a left ICA segment aneurysm in the communicating segment at the region of the posterior communicating artery which appears to have a 7 mm aneurysm, there as well.    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    1. Unable to appreciate any focal deficits on virtual audio visual visit    2. MRA of the brain with what appears to be a 12 mm left MCA bifurcation aneurysms.  There is a excrescence at the basilar apex approximately 6 mm with some irregularity into the left P1 segment.  There also is a left ICA segment aneurysm in the communicating segment at the region of the posterior communicating artery which appears to have a 7 mm aneurysm, there as well.    3. A long discussion with the patient.  Given the multifocal nature of aneurysms, long history of smoking, would recommend additional workup.  Would recommend CTA of the head and neck.  Would also recommend MRA vessel wall imaging.  Patient will also certainly need a diagnostic cerebral angiogram to further elucidate the angiographic architecture.  I discussed with the patient risks, benefits, and alternatives to the diagnostic cerebral angiogram, including but not limited to heart attack, coma, stroke, infection, bleeding, paralysis, even death.  Informed consent will be obtained and secured in chart as the patient understood the risks, benefits, and alternatives elected to proceed.  Answered all patient's questions her satisfaction.    Thank you so very much for allowing me to participate in the care of the patient.  Please feel free to call any questions, comments, concerns.    Sejal Simon MD,MSc  Department of Neurosurgery   Department of Radiology  Department of Neurology  [facility]    [facility]   [employer] [facility] [employer]    Total time spent in counseling and discussion about further management options including relevant lab work, treatment,  prognosis, medications and intended side effects was  more than 60 minutes. More than 50 % of the time was spent in counseling and coordination of care.  I spent a total of 60 minutes on the day of the visit.This includes face to face time and non-face to face time preparing to see the patient (eg, review of tests), Obtaining and/or reviewing separately obtained history, Documenting clinical information in the electronic or other health record, Independently interpreting resultsand communicating results to the patient/family/caregiver, or Care coordination     Virtual visit Attestation   The patient location is: Home  The chief complaint leading to consultation is:  Multiple cerebral aneurysms  Visit type: audiovisual  Total time spent with patient: 45 min     Each patient to whom he or she provides medical services by telemedicine is:  (1) informed of the relationship between the physician and patient and the respective role of any other health care provider with respect to management of the patient; and (2) notified that he or she may decline to receive medical services by telemedicine and may withdraw from such care at any time.           Note dictated with voice recognition software, please excuse any grammatical errors.

## 2024-02-06 NOTE — TELEPHONE ENCOUNTER
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 Will send appointment letter in mail and patient confirmed appointment location/date/time.

## 2024-02-11 NOTE — TELEPHONE ENCOUNTER
Care Due:                  Date            Visit Type   Department     Provider  --------------------------------------------------------------------------------                                EP -                              PRIMARY      [facility] INTERNAL  Last Visit: 05-      CARE ([facility])   MEDICINE       Lei Garcia                              EP -                              PRIMARY      [facility] INTERNAL  Next Visit: 06-      CARE ([facility])   MEDICINE       Lei Garcia                                                            Last  Test          Frequency    Reason                     Performed    Due Date  --------------------------------------------------------------------------------    Lipid Panel.  12 months..  atorvastatin.............  Not Found    Overdue    [employer] Embedded Care Due Messages. Reference number: [id number].   2/11/2024 2:43:06 AM CST

## 2024-02-12 RX ORDER — FUROSEMIDE 80 MG/1
TABLET ORAL
Qty: 180 TABLET | Refills: 1 | Status: SHIPPED | OUTPATIENT
Start: 2024-02-12 | End: 2024-06-18

## 2024-02-12 NOTE — TELEPHONE ENCOUNTER
Provider Staff:  Action required for this patient     Please see care gap opportunities below in Care Due Message.    Thanks!  [facility]     Appointments      Date Provider   Last Visit   5/29/2023 Lei Garcia MD   Next Visit   6/6/2024 Lei Garcia MD      Refill Decision Note   Ca Coats  is requesting a refill authorization.  Brief Assessment and Rationale for Refill:  Approve     Medication Therapy Plan:         Comments:     Note composed:2:58 AM 02/12/2024

## 2024-02-16 ENCOUNTER — TELEPHONE (OUTPATIENT)
Dept: NEUROSURGERY | Facility: CLINIC | Age: 74
End: 2024-02-16
Payer: MEDICARE

## 2024-02-16 NOTE — TELEPHONE ENCOUNTER
Called and spoke with pt. Informed date and time for the MRA/CTA appts. Pt verbalized understanding

## 2024-02-26 ENCOUNTER — TELEPHONE (OUTPATIENT)
Dept: NEUROSURGERY | Facility: CLINIC | Age: 74
End: 2024-02-26
Payer: MEDICARE

## 2024-02-26 NOTE — TELEPHONE ENCOUNTER
----- Message from Carol Michelle sent at 2/26/2024  1:33 PM CST -----  Regarding: pt advice  Contact: [phone]  Pt asking to have MRI appt moved to a sooner date. Pt stated her head is spinning and she does not think she can wait till the appt date of 4/1. Pls call to discuss.

## 2024-02-28 ENCOUNTER — TELEPHONE (OUTPATIENT)
Dept: PAIN MEDICINE | Facility: CLINIC | Age: 74
End: 2024-02-28
Payer: MEDICARE

## 2024-02-28 DIAGNOSIS — G89.4 CHRONIC PAIN SYNDROME: ICD-10-CM

## 2024-02-28 RX ORDER — OXYCODONE AND ACETAMINOPHEN 5; 325 MG/1; MG/1
1 TABLET ORAL EVERY 8 HOURS PRN
Qty: 90 TABLET | Refills: 0 | Status: CANCELLED | OUTPATIENT
Start: 2024-02-28 | End: 2024-03-29

## 2024-02-28 NOTE — TELEPHONE ENCOUNTER
----- Message from Shivani Baca sent at 2/27/2024 12:12 PM CST -----  Regarding: refill  Who Called:pt      Refill or New Rx: Refill  oxyCODONE-acetaminophen (PERCOCET) 5-325 mg per tablet      RX Name and Strength      Is this a 30 day or 90 day RX:      Preferred Pharmacy with phone number:  [facility]   Phone: [phone]  Fax: [phone]            Local or Mail Order:  Local     Would the patient rather a call back or a response via [facility]?      best Call Back Number: [phone]      Additional Information:     Continued Stay Note  [facility]     Patient Name: Chase Obrien  MRN: [id number]  Today's Date: 1/7/2022    Admit Date: 12/13/2021     Discharge Plan     Row Name 01/07/22 1416       [place]    [place] [facility]    [place] [facility] [place] [facility] [place] [facility]. We are awaiting insurance approval, per [place]. Patient and SO, Rohini updated and in agreement with discharge plan. CM will continue to follow.    Final Discharge Disposition Code 03 - skilled nursing facility (SNF)               Discharge Codes    No documentation.               Expected Discharge Date and Time     Expected Discharge Date Expected Discharge Time    Jan 9, 2022             Naida Grigsby RN

## 2024-02-28 NOTE — TELEPHONE ENCOUNTER
----- Message from Shivani Baca sent at 2/27/2024 12:12 PM CST -----  Regarding: refill  Who Called:pt      Refill or New Rx: Refill  oxyCODONE-acetaminophen (PERCOCET) 5-325 mg per tablet      RX Name and Strength      Is this a 30 day or 90 day RX:      Preferred Pharmacy with phone number:  [facility]   Phone: [phone]  Fax: [phone]            Local or Mail Order:  Local     Would the patient rather a call back or a response via [facility]?      best Call Back Number: [phone]      Additional Information:

## 2024-02-29 DIAGNOSIS — G89.4 CHRONIC PAIN SYNDROME: ICD-10-CM

## 2024-02-29 RX ORDER — OXYCODONE AND ACETAMINOPHEN 5; 325 MG/1; MG/1
1 TABLET ORAL EVERY 8 HOURS PRN
Qty: 90 TABLET | Refills: 0 | OUTPATIENT
Start: 2024-02-29 | End: 2024-03-30

## 2024-03-01 DIAGNOSIS — G89.4 CHRONIC PAIN SYNDROME: ICD-10-CM

## 2024-03-01 RX ORDER — OXYCODONE AND ACETAMINOPHEN 5; 325 MG/1; MG/1
1 TABLET ORAL EVERY 8 HOURS PRN
Qty: 90 TABLET | Refills: 0 | Status: SHIPPED | OUTPATIENT
Start: 2024-03-01 | End: 2024-03-26 | Stop reason: SDUPTHER

## 2024-03-01 NOTE — TELEPHONE ENCOUNTER
----- Message from Irina Torrez sent at 3/1/2024  9:43 AM CST -----  Regarding: refill  Name of caller: pt       What is the requesting detail: pt is requesting a refill for oxyCODONE-acetaminophen (PERCOCET) 5-325 mg per table. Please advise       [facility]      Can the clinic reply by [facility]:       What number to call back: [phone]           ENT

## 2024-03-01 NOTE — TELEPHONE ENCOUNTER
Patient requesting refill on oxycodone  Last office visit 11/01/2023   shows last refill on 1/29/2024  Patient does have a pain contract on file with [facility]  Patient last UDS 8/16/2022 was consistent with current therapy     CODEINE  Not Detected Not Detected   MORPHINE  Not Detected Not Detected   6-ACETYLMORPHINE  Not Detected Not Detected   OXYCODONE  Present Present   NOROYXCODONE  Present Present   OXYMORPHONE  Present Not Detected   NOROXYMORPHONE  Present Not Detected   HYDROCODONE  Not Detected Not Detected   NORHYDROCODONE  Not Detected Not Detected   HYDROMORPHONE  Not Detected Not Detected   BUPRENORPHINE  Not Detected Not Detected   NORUBPRENORPHINE  Not Detected Not Detected   FENTANYL  Not Detected Not Detected   NORFENTANYL  Not Detected Not Detected   MEPERIDINE METABOLITE  Not Detected Not Detected   TAPENTADOL  Not Detected Not Detected   TAPENTADOL-O-SULF  Not Detected Not Detected   METHADONE  Not Detected Not Detected   TRAMADOL  Not Detected Not Detected   AMPHETAMINE  Not Detected Not Detected   METHAMPHETAMINE  Not Detected Not Detected   MDMA- ECSTASY  Not Detected Not Detected   MDA  Not Detected Not Detected   MDEA- Genoveva  Not Detected Not Detected   METHYLPHENIDATE  Not Detected Not Detected   PHENTERMINE  Not Detected Not Detected   BENZOYLECGONINE  Not Detected Not Detected   ALPRAZOLAM  Not Detected Not Detected   ALPHA-OH-ALPRAZOLAM  Not Detected Not Detected   CLONAZEPAM  Not Detected Not Detected   7-AMINOCLONAZEPAM  Not Detected Not Detected   DIAZEPAM  Not Detected Not Detected   NORDIAZEPAM  Not Detected Not Detected   OXAZEPAM  Not Detected Not Detected   TEMAZEPAM  Not Detected Not Detected   Lorazepam  Not Detected Not Detected   MIDAZOLAM  Not Detected Not Detected   ZOLPIDEM  Not Detected Not Detected   BARBITURATES  Not Detected Not Detected   Creatinine, Urine 20.0 - 400.0 mg/dL 205.6 102.0   ETHYL GLUCURONIDE  Present Not Detected    MARIJUANA METABOLITE  Not Detected Not Detected   PCP  Not Detected Not Detected   CARISOPRODOL  Not Detected Not Detected CM   Comment: The carisoprodol immunoassay has cross-reactivity to  carisoprodol and meprobamate.   Naloxone  Not Detected    Gabapentin  Present    Pregabalin  Not Detected    Alpha-OH-Midazolam  Not Detected    Zolpidem Metabolite  Not Detected    PAIN MANAGEMENT DRUG PANEL  See Below See Below CM

## 2024-03-05 ENCOUNTER — ANESTHESIA (OUTPATIENT)
Dept: ENDOSCOPY | Facility: HOSPITAL | Age: 74
End: 2024-03-05
Payer: MEDICARE

## 2024-03-05 ENCOUNTER — ANESTHESIA EVENT (OUTPATIENT)
Dept: ENDOSCOPY | Facility: HOSPITAL | Age: 74
End: 2024-03-05
Payer: MEDICARE

## 2024-03-05 ENCOUNTER — HOSPITAL ENCOUNTER (OUTPATIENT)
Facility: HOSPITAL | Age: 74
Discharge: HOME | End: 2024-03-05
Attending: INTERNAL MEDICINE | Admitting: INTERNAL MEDICINE
Payer: MEDICARE

## 2024-03-05 VITALS
TEMPERATURE: 98 F | WEIGHT: 261 LBS | HEIGHT: 66 IN | HEART RATE: 79 BPM | BODY MASS INDEX: 41.95 KG/M2 | SYSTOLIC BLOOD PRESSURE: 125 MMHG | RESPIRATION RATE: 13 BRPM | DIASTOLIC BLOOD PRESSURE: 74 MMHG | OXYGEN SATURATION: 97 %

## 2024-03-05 DIAGNOSIS — C16.9 GASTRIC ADENOCARCINOMA: Primary | ICD-10-CM

## 2024-03-05 DIAGNOSIS — Z85.028 HISTORY OF CANCER OF STOMACH: ICD-10-CM

## 2024-03-05 PROCEDURE — D9220A PRA ANESTHESIA: Mod: HCNC,ANES,, | Performed by: SURGERY

## 2024-03-05 PROCEDURE — 63600175 PHARM REV CODE 636 W HCPCS: Mod: HCNC | Performed by: NURSE ANESTHETIST, CERTIFIED REGISTERED

## 2024-03-05 PROCEDURE — 88305 TISSUE EXAM BY PATHOLOGIST: CPT | Mod: HCNC | Performed by: PATHOLOGY

## 2024-03-05 PROCEDURE — 43259 EGD US EXAM DUODENUM/JEJUNUM: CPT | Mod: HCNC | Performed by: INTERNAL MEDICINE

## 2024-03-05 PROCEDURE — 25000003 PHARM REV CODE 250: Mod: HCNC | Performed by: NURSE ANESTHETIST, CERTIFIED REGISTERED

## 2024-03-05 PROCEDURE — 37000008 HC ANESTHESIA 1ST 15 MINUTES: Mod: HCNC | Performed by: INTERNAL MEDICINE

## 2024-03-05 PROCEDURE — 43239 EGD BIOPSY SINGLE/MULTIPLE: CPT | Mod: 51,HCNC,, | Performed by: INTERNAL MEDICINE

## 2024-03-05 PROCEDURE — 27201012 HC FORCEPS, HOT/COLD, DISP: Mod: HCNC | Performed by: INTERNAL MEDICINE

## 2024-03-05 PROCEDURE — 88305 TISSUE EXAM BY PATHOLOGIST: CPT | Mod: 26,HCNC,, | Performed by: PATHOLOGY

## 2024-03-05 PROCEDURE — 25000003 PHARM REV CODE 250: Mod: HCNC | Performed by: ANESTHESIOLOGY

## 2024-03-05 PROCEDURE — 37000009 HC ANESTHESIA EA ADD 15 MINS: Mod: HCNC | Performed by: INTERNAL MEDICINE

## 2024-03-05 PROCEDURE — 25000003 PHARM REV CODE 250: Mod: HCNC | Performed by: INTERNAL MEDICINE

## 2024-03-05 PROCEDURE — 43239 EGD BIOPSY SINGLE/MULTIPLE: CPT | Mod: HCNC | Performed by: INTERNAL MEDICINE

## 2024-03-05 PROCEDURE — 43259 EGD US EXAM DUODENUM/JEJUNUM: CPT | Mod: HCNC,,, | Performed by: INTERNAL MEDICINE

## 2024-03-05 PROCEDURE — D9220A PRA ANESTHESIA: Mod: HCNC,CRNA,, | Performed by: NURSE ANESTHETIST, CERTIFIED REGISTERED

## 2024-03-05 RX ORDER — LIDOCAINE HYDROCHLORIDE 20 MG/ML
INJECTION INTRAVENOUS
Status: DISCONTINUED | OUTPATIENT
Start: 2024-03-05 | End: 2024-03-05

## 2024-03-05 RX ORDER — ONDANSETRON HYDROCHLORIDE 2 MG/ML
INJECTION, SOLUTION INTRAVENOUS
Status: DISCONTINUED | OUTPATIENT
Start: 2024-03-05 | End: 2024-03-05

## 2024-03-05 RX ORDER — SODIUM CHLORIDE 9 MG/ML
INJECTION, SOLUTION INTRAVENOUS CONTINUOUS
Status: DISCONTINUED | OUTPATIENT
Start: 2024-03-05 | End: 2024-03-05 | Stop reason: HOSPADM

## 2024-03-05 RX ORDER — PROPOFOL 10 MG/ML
VIAL (ML) INTRAVENOUS
Status: DISCONTINUED | OUTPATIENT
Start: 2024-03-05 | End: 2024-03-05

## 2024-03-05 RX ORDER — SODIUM CHLORIDE 0.9 % (FLUSH) 0.9 %
10 SYRINGE (ML) INJECTION
Status: DISCONTINUED | OUTPATIENT
Start: 2024-03-05 | End: 2024-03-05 | Stop reason: HOSPADM

## 2024-03-05 RX ADMIN — SODIUM CHLORIDE: 0.9 INJECTION, SOLUTION INTRAVENOUS at 10:03

## 2024-03-05 RX ADMIN — LIDOCAINE HYDROCHLORIDE 100 MG: 20 INJECTION INTRAVENOUS at 10:03

## 2024-03-05 RX ADMIN — ONDANSETRON 4 MG: 2 INJECTION INTRAMUSCULAR; INTRAVENOUS at 10:03

## 2024-03-05 RX ADMIN — SODIUM CHLORIDE: 9 INJECTION, SOLUTION INTRAVENOUS at 10:03

## 2024-03-05 RX ADMIN — PROPOFOL 20 MG: 10 INJECTION, EMULSION INTRAVENOUS at 10:03

## 2024-03-05 RX ADMIN — PROPOFOL 50 MG: 10 INJECTION, EMULSION INTRAVENOUS at 10:03

## 2024-03-05 NOTE — PLAN OF CARE
Pt alert and orientated. Family not at bs, on their way and will call on arrival. VSS. No distress noted. Pt denies N/V/D. Pt tolerating PO fluids.  Pt states they are ready for discharge.     - - -

## 2024-03-05 NOTE — PROVATION PATIENT INSTRUCTIONS
Discharge Summary/Instructions after an Endoscopic Procedure  Patient Name: Ca Coats  Patient MRN: [id number]  Patient YOB: 1950 Tuesday, March 5, 2024  Kevin Carballo MD  Dear patient,  As a result of recent federal legislation (The Federal Cures Act), you may   receive lab or pathology results from your procedure in your [facility]   account before your physician is able to contact you. Your physician or   their representative will relay the results to you with their   recommendations at their soonest availability.  Thank you,  RESTRICTIONS:  During your procedure today, you received medications for sedation.  These   medications may affect your judgment, balance and coordination.  Therefore,   for 24 hours, you have the following restrictions:   - DO NOT drive a car, operate machinery, make legal/financial decisions,   sign important papers or drink alcohol.    ACTIVITY:  Today: no heavy lifting, straining or running due to procedural   sedation/anesthesia.  The following day: return to full activity including work.  DIET:  Eat and drink normally unless instructed otherwise.     TREATMENT FOR COMMON SIDE EFFECTS:  - Mild abdominal pain, nausea, belching, bloating or excessive gas:  rest,   eat lightly and use a heating pad.  - Sore Throat: treat with throat lozenges and/or gargle with warm salt   water.  - Because air was used during the procedure, expelling large amounts of air   from your rectum or belching is normal.  - If a bowel prep was taken, you may not have a bowel movement for 1-3 days.    This is normal.  SYMPTOMS TO WATCH FOR AND REPORT TO YOUR PHYSICIAN:  1. Abdominal pain or bloating, other than gas cramps.  2. Chest pain.  3. Back pain.  4. Signs of infection such as: chills or fever occurring within 24 hours   after the procedure.  5. Rectal bleeding, which would show as bright red, maroon, or black stools.   (A tablespoon of blood from the rectum is not serious, especially  if   hemorrhoids are present.)  6. Vomiting.  7. Weakness or dizziness.  GO DIRECTLY TO THE NEAREST EMERGENCY ROOM IF YOU HAVE ANY OF THE FOLLOWING:      Difficulty breathing              Chills and/or fever over 101 F   Persistent vomiting and/or vomiting blood   Severe abdominal pain   Severe chest pain   Black, tarry stools   Bleeding- more than one tablespoon   Any other symptom or condition that you feel may need urgent attention  Your doctor recommends these additional instructions:  If any biopsies were taken, your doctors clinic will contact you in 1 to 2   weeks with any results.  - Discharge patient to home (ambulatory).   - Resume previous diet.   - Await path results.   - Repeat the upper endoscopic ultrasound in 1 year for surveillance based on   pathology results.  For questions, problems or results please call your physician - Kevin Carballo MD at Work:  [phone].  [facility], EMERGENCY ROOM PHONE NUMBER: [phone]  IF A COMPLICATION OR EMERGENCY SITUATION ARISES AND YOU ARE UNABLE TO REACH   YOUR PHYSICIAN - GO DIRECTLY TO THE EMERGENCY ROOM.  Kevin Carballo MD  3/5/2024 11:07:24 AM  This report has been verified and signed electronically.  Dear patient,  As a result of recent federal legislation (The Federal Cures Act), you may   receive lab or pathology results from your procedure in your [facility]   account before your physician is able to contact you. Your physician or   their representative will relay the results to you with their   recommendations at their soonest availability.  Thank you,  PROVATION

## 2024-03-05 NOTE — BRIEF OP NOTE
Discharge Summary/Instructions after an Endoscopic Procedure    Patient Name: Ca Coats  Patient MRN: [id number]  Patient YOB: 1950 Tuesday, March 5, 2024  Kevin Carballo MD    Dear patient,  As a result of recent federal legislation (The Federal Cures Act), you may receive lab or pathology results from your procedure in your [facility] account before your physician is able to contact you. Your physician or their representative will relay the results to you with their recommendations at their soonest availability.  Thank you,    RESTRICTIONS:  During your procedure today, you received medications for sedation.  These medications may affect your judgment, balance and coordination.  Therefore, for 24 hours, you have the following restrictions:     - DO NOT drive a car, operate machinery, make legal/financial decisions, sign important papers or drink alcohol.      ACTIVITY:  Today: no heavy lifting, straining or running due to procedural sedation/anesthesia.  The following day: return to full activity including work.    DIET:  Eat and drink normally unless instructed otherwise.     TREATMENT FOR COMMON SIDE EFFECTS:  - Mild abdominal pain, nausea, belching, bloating or excessive gas:  rest, eat lightly and use a heating pad.  - Sore Throat: treat with throat lozenges and/or gargle with warm salt water.  - Because air was used during the procedure, expelling large amounts of air from your rectum or belching is normal.  - If a bowel prep was taken, you may not have a bowel movement for 1-3 days.  This is normal.      SYMPTOMS TO WATCH FOR AND REPORT TO YOUR PHYSICIAN:  1. Abdominal pain or bloating, other than gas cramps.  2. Chest pain.  3. Back pain.  4. Signs of infection such as: chills or fever occurring within 24 hours after the procedure.  5. Rectal bleeding, which would show as bright red, maroon, or black stools. (A tablespoon of blood from the rectum is not serious, especially if hemorrhoids  are present.)  6. Vomiting.  7. Weakness or dizziness.      GO DIRECTLY TO THE NEAREST EMERGENCY ROOM IF YOU HAVE ANY OF THE FOLLOWING:     Difficulty breathing              Chills and/or fever over 101 F   Persistent vomiting and/or vomiting blood   Severe abdominal pain   Severe chest pain   Black, tarry stools   Bleeding- more than one tablespoon   Any other symptom or condition that you feel may need urgent attention    Your doctor recommends these additional instructions:  If any biopsies were taken, your doctors clinic will contact you in 1 to 2 weeks with any results.    - Discharge patient to home (ambulatory).   - Resume previous diet.   - Await path results.   - Repeat the upper endoscopic ultrasound in 1 year for surveillance based on pathology results.    For questions, problems or results please call your physician - Kevin Carballo MD at Work:  [phone].    [facility], EMERGENCY ROOM PHONE NUMBER: [phone]    IF A COMPLICATION OR EMERGENCY SITUATION ARISES AND YOU ARE UNABLE TO REACH YOUR PHYSICIAN - GO DIRECTLY TO THE EMERGENCY ROOM.

## 2024-03-05 NOTE — ANESTHESIA PREPROCEDURE EVALUATION
03/05/2024  Pre-operative evaluation for Procedure(s) (LRB):  ULTRASOUND, UPPER GI TRACT, ENDOSCOPIC (N/A)  EGD (ESOPHAGOGASTRODUODENOSCOPY) (N/A)    Ca Coats is a 73 y.o. female     ECHO 1/25/24  The left ventricle is normal in size with normal systolic function. The estimated ejection fraction is 65%.  Grade II left ventricular diastolic dysfunction.  Normal right ventricular size with normal right ventricular systolic function.  Biatrial enlargement.  Mild tricuspid regurgitation.  The estimated PA systolic pressure is 41 mmHg.  There is pulmonary hypertension.  Normal central venous pressure (3 mmHg).  There is mobile annular calcifications attached to the nikhil-lateral part of annulus of the valve ( image 46). Clinical correlation is required.       Patient Active Problem List   Diagnosis    Primary osteoarthritis of left knee    Obstructive sleep apnea    Chronic knee pain    Left knee DJD    Essential hypertension    Major depressive disorder    BMI 40.0-44.9, adult    Joint pain    Gait instability    At high risk for injury related to fall    Debility    Hypothyroidism    Left knee pain    Osteopenia of neck of left femur    Gastroesophageal reflux disease    Chronic diastolic congestive heart failure    Pure hypercholesterolemia    Tobacco abuse    Abnormal cardiovascular stress test    Gastric adenocarcinoma    Iron deficiency anemia secondary to blood loss (chronic)    Chronic pain    History of DVT (deep vein thrombosis)    Current mild episode of major depressive disorder without prior episode    Immunodeficiency due to conditions classified elsewhere    DVT of deep femoral vein, right    Atherosclerosis of aorta    Nervous    Anemia    Edema    Postural dizziness    Constipation    History of Clostridium difficile infection    Opioid use    Impaired functional mobility,  balance, gait, and endurance    Decreased range of motion of left knee    Muscle weakness    s/p hscope/D&C/polypectomy    Dizziness    Neuropathy       Review of patient's allergies indicates:  No Known Allergies    Current Facility-Administered Medications on File Prior to Encounter   Medication Dose Route Frequency Provider Last Rate Last Admin    0.9%  NaCl infusion   Intravenous Continuous Tevin Clark MD   Stopped at 01/13/22 1055    0.9%  NaCl infusion   Intravenous Continuous Kevin Carballo MD        sodium chloride 0.9% flush 10 mL  10 mL Intravenous PRN Kevin Carballo MD         Current Outpatient Medications on File Prior to Encounter   Medication Sig Dispense Refill    acetaminophen (TYLENOL) 500 MG tablet       amLODIPine (NORVASC) 10 MG tablet TAKE 1 TABLET EVERY DAY 90 tablet 1    atorvastatin (LIPITOR) 20 MG tablet TAKE 1 TABLET EVERY DAY 90 tablet 2    B-complex with vitamin C (Z-BEC OR EQUIV) tablet Take 1 tablet by mouth once daily.       CALCIUM CITRATE-VITAMIN D2 ORAL Take 1 tablet by mouth once daily.       citalopram (CELEXA) 10 MG tablet TAKE 1 TABLET EVERY DAY 90 tablet 3    dextran 70-hypromellose (TEARS) ophthalmic solution Place 1 drop into the right eye every 6 (six) hours. 30 mL 0    ELIQUIS 5 mg Tab TAKE 1 TABLET TWICE DAILY 180 tablet 2    ferrous gluconate (FERGON) 324 MG tablet TAKE 1 TABLET (324 MG TOTAL) BY MOUTH DAILY WITH BREAKFAST. 90 tablet 3    gabapentin (NEURONTIN) 300 MG capsule Take 2 capsules (600 mg total) by mouth 3 (three) times daily. 540 capsule 2    LIDOcaine-prilocaine (EMLA) cream Apply topically as needed (prior to port access). 30 g 0    lisinopriL (PRINIVIL,ZESTRIL) 40 MG tablet TAKE 1 TABLET EVERY DAY (Patient taking differently: every evening. TAKE 1 TABLET EVERY NIGHT) 90 tablet 1    multivitamin with minerals tablet Take 1 tablet by mouth once daily.       semaglutide (OZEMPIC) 1 mg/dose (4 mg/3 mL) INJECT 1 MG INTO THE SKIN EVERY 7 DAYS. 9  each 11       Past Surgical History:   Procedure Laterality Date    APPENDECTOMY      CARDIAC SURGERY      CATARACT EXTRACTION W/  INTRAOCULAR LENS IMPLANT Bilateral     MFIOL OU    CORONARY ANGIOGRAPHY Bilateral 02/24/2021    Procedure: ANGIOGRAM, CORONARY ARTERY;  Surgeon: Cresencio Ridley MD;  Location: [facility] CATH LAB;  Service: Cardiology;  Laterality: Bilateral;  Covid test needed - ok per Danay MORENOU. Pt. w/o transportation or family    ENDOSCOPIC ULTRASOUND OF UPPER GASTROINTESTINAL TRACT N/A 03/17/2021    Procedure: ULTRASOUND, UPPER GI TRACT, ENDOSCOPIC;  Surgeon: Gerald Anaya MD;  Location: [facility] (2ND FLR);  Service: Endoscopy;  Laterality: N/A;  Pt unable to get a ride for swab. Will do rapid test at 8:30 - ttr    ENDOSCOPIC ULTRASOUND OF UPPER GASTROINTESTINAL TRACT N/A 01/13/2022    Procedure: ULTRASOUND, UPPER GI TRACT, ENDOSCOPIC;  Surgeon: Kevin Carballo MD;  Location: [facility] (2ND FLR);  Service: Endoscopy;  Laterality: N/A;  blood thinner approval received, see telephone encounter 1/7/22-BB  rapid  instructions given verbally and sent to address on file in pt's chart-BB    ENDOSCOPIC ULTRASOUND OF UPPER GASTROINTESTINAL TRACT N/A 10/11/2022    Procedure: ULTRASOUND, UPPER GI TRACT, ENDOSCOPIC;  Surgeon: Dequan Ridley MD;  Location: [facility] (2ND FLR);  Service: Endoscopy;  Laterality: N/A;    ENDOSCOPIC ULTRASOUND OF UPPER GASTROINTESTINAL TRACT N/A 1/24/2024    Procedure: ULTRASOUND, UPPER GI TRACT, ENDOSCOPIC;  Surgeon: Kevin Carballo MD;  Location: [facility] (2ND FLR);  Service: Endoscopy;  Laterality: N/A;    ESOPHAGOGASTRODUODENOSCOPY N/A 02/05/2021    Procedure: EGD (ESOPHAGOGASTRODUODENOSCOPY);  Surgeon: Matthew Hernadez MD;  Location: [facility] ENDO (2ND FLR);  Service: Endoscopy;  Laterality: N/A;  BMI-58    Wt: 361#     COVID test at [facility] on 2/2-GT    ESOPHAGOGASTRODUODENOSCOPY N/A 03/17/2021    Procedure: EGD (ESOPHAGOGASTRODUODENOSCOPY);  Surgeon: Gerald Anaya MD;   Location: [facility] ENDO (2ND FLR);  Service: Endoscopy;  Laterality: N/A;    ESOPHAGOGASTRODUODENOSCOPY N/A 05/25/2021    Procedure: EGD (ESOPHAGOGASTRODUODENOSCOPY);  Surgeon: Kevin Carballo MD;  Location: [facility] ENDO (2ND FLR);  Service: Endoscopy;  Laterality: N/A;  ESD 2 hours  Full COVID vaccination on file. ttr    ESOPHAGOGASTRODUODENOSCOPY N/A 01/13/2022    Procedure: EGD (ESOPHAGOGASTRODUODENOSCOPY);  Surgeon: Kevin Carballo MD;  Location: [facility] ENDO (2ND FLR);  Service: Endoscopy;  Laterality: N/A;  blood thinner approval, see telephone encounter 1/7/22-BB  rapid  instructions given verbally and sent to address on file in pt's chart-BB    ESOPHAGOGASTRODUODENOSCOPY N/A 10/11/2022    Procedure: EGD (ESOPHAGOGASTRODUODENOSCOPY);  Surgeon: Dequan Ridley MD;  Location: [facility] (2ND FLR);  Service: Endoscopy;  Laterality: N/A;  She is do for EGD/EUS now. Personal history of gastric cancer. Ca Coats #[id number].   Thanks,   Kevin Fuentes MD    Pt is fully vaccinated-DS  9/14/22-Approval to hold Eliquis rec'd from Dr. Pelaez (see telephone encounter 9/14/22)-DS  9/14/22-Ins    ESOPHAGOGASTRODUODENOSCOPY N/A 1/24/2024    Procedure: EGD (ESOPHAGOGASTRODUODENOSCOPY);  Surgeon: Kevin Carballo MD;  Location: [facility] ENDO (2ND FLR);  Service: Endoscopy;  Laterality: N/A;  12/8/23: instructions sent via portal. eliquis approval from MD Pelaez in telephone encounter (12/12/23) -GD  1/9-precall complete-MS    EYE SURGERY      HYSTEROSCOPIC POLYPECTOMY OF UTERUS  1/10/2024    Procedure: POLYPECTOMY, UTERUS, HYSTEROSCOPIC;  Surgeon: Pina Pickens MD;  Location: [facility];  Service: OB/GYN;;    HYSTEROSCOPY WITH DILATION AND CURETTAGE OF UTERUS N/A 1/10/2024    Procedure: HYSTEROSCOPY, WITH DILATION AND CURETTAGE OF UTERUS;  Surgeon: Pina Pickens MD;  Location: [facility];  Service: OB/GYN;  Laterality: N/A;    INJECTION OF ANESTHETIC AGENT AROUND NERVE Left 07/10/2018    Procedure: BLOCK, NERVE;  Surgeon:  Samantha Vidal MD;  Location: [facility] PAIN MGT;  Service: Pain Management;  Laterality: Left;  Genicular     INJECTION OF ANESTHETIC AGENT AROUND NERVE Left 07/19/2019    Procedure: Block, Nerve NERVE BLOCK GENICULAR WITH PHENOL 6%;  Surgeon: Samantha Vidal MD;  Location: [facility] PAIN MGT;  Service: Pain Management;  Laterality: Left;  NEEDS CONSENT    INSERTION OF TUNNELED CENTRAL VENOUS CATHETER (CVC) WITH SUBCUTANEOUS PORT N/A 07/09/2021    Procedure: INSERTION, PORT-A-CATH;  Surgeon: Mario Paz MD;  Location: [facility] CATH LAB;  Service: Radiology;  Laterality: N/A;  PORT PLACEMENT    RADIOFREQUENCY ABLATION Left 06/19/2020    Procedure: RADIOFREQUENCY ABLATION LEFT GENICULAR;  Surgeon: Tevin Clark MD;  Location: [facility] PAIN MGT;  Service: Pain Management;  Laterality: Left;  Left Genicular RFA    RADIOFREQUENCY ABLATION Left 01/22/2021    Procedure: RADIOFREQUENCY ABLATION LEFT GENICULAR;  Surgeon: Tevin Clark MD;  Location: [facility] PAIN MGT;  Service: Pain Management;  Laterality: Left;    RADIOFREQUENCY ABLATION Left 07/30/2021    Procedure: RADIOFREQUENCY ABLATION, GENICULAR COOLED NEED CONSENT;  Surgeon: Tevin Clark MD;  Location: [facility] PAIN MGT;  Service: Pain Management;  Laterality: Left;    RADIOFREQUENCY ABLATION Left 04/22/2022    Procedure: RADIOFREQUENCY ABLATION, GENICULAR COOLED , LEFT;  Surgeon: Tevin Clark MD;  Location: [facility] PAIN MGT;  Service: Pain Management;  Laterality: Left;    RADIOFREQUENCY ABLATION Left 12/23/2022    Procedure: RADIOFREQUENCY ABLATION LEFT GENICULAR COOLED CLEARED TO HOLD ELIQUIS 3 DAYS  NEEDS CONSENT;  Surgeon: Tevin Clark MD;  Location: [facility] PAIN MGT;  Service: Pain Management;  Laterality: Left;    TONSILLECTOMY         Social History     Socioeconomic History    Marital status: Single    Number of children: 2   Occupational History     Comment: retired  and cook   Tobacco Use    Smoking status: Former     Types: Cigarettes     Start  date: 2018    Smokeless tobacco: Never    Tobacco comments:     currently smoking <5 cigarettes most days   Substance and Sexual Activity    Alcohol use: Yes     Comment: rarely    Drug use: No    Sexual activity: Not Currently     Partners: Male   Social History Narrative    2 children (dtr in area, son in [place]) and she has 3 grandkids (in [place]),    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    Originally from [facility]     Social Determinants of Health     Financial Resource Strain: Medium Risk (10/31/2023)    Overall Financial Resource Strain (CARDIA)     Difficulty of Paying Living Expenses: Somewhat hard   Food Insecurity: No Food Insecurity (10/31/2023)    Hunger Vital Sign     Worried About Running Out of Food in the Last Year: Never true     Ran Out of Food in the Last Year: Never true   Transportation Needs: Unmet Transportation Needs (10/31/2023)    PRAPARE - Transportation     Lack of Transportation (Medical): Yes     Lack of Transportation (Non-Medical): Yes   Physical Activity: Inactive (10/31/2023)    Exercise Vital Sign     Days of Exercise per Week: 0 days     Minutes of Exercise per Session: 0 min   Stress: Stress Concern Present (10/31/2023)    Citizen of [place] [employer] - Occupational Stress Questionnaire     Feeling of Stress : To some extent   Social Connections: Moderately Isolated (10/31/2023)    Social Connection and Isolation Panel [NHANES]     Frequency of Communication with Friends and Family: Three times a week     Frequency of Social Gatherings with Friends and Family: Three times a week     Attends Taoist Services: 1 to 4 times per year     Active Member of Clubs or Organizations: No     Attends Club or Organization Meetings: Never     Marital Status:    Housing Stability: Low Risk  (10/31/2023)    Housing Stability Vital Sign     Unable to Pay for Housing in the Last Year: No     Number of Places Lived in the Last Year: 1     Unstable Housing in the Last Year:  No             Pre-op Assessment    I have reviewed the Patient Summary Reports.     I have reviewed the Nursing Notes. I have reviewed the NPO Status.      Review of Systems  Anesthesia Hx:  No problems with previous Anesthesia                Cardiovascular:     Hypertension       CHF                                 Pulmonary:        Sleep Apnea                Hepatic/GI:     GERD             Musculoskeletal:  Arthritis               Neurological:    Neuromuscular Disease,                                   Endocrine:   Hypothyroidism          Psych:  Psychiatric History                  Physical Exam    Airway:  Mallampati: II   Mouth Opening: Normal  Tongue: Normal    Chest/Lungs:  Normal Respiratory Rate    Heart:  Rhythm: Regular Rhythm        Anesthesia Plan  Type of Anesthesia, risks & benefits discussed:    Anesthesia Type: Gen Natural Airway  Intra-op Monitoring Plan: Standard ASA Monitors  Induction:  IV  Informed Consent: Informed consent signed with the Patient and all parties understand the risks and agree with anesthesia plan.  All questions answered.   ASA Score: 3    Ready For Surgery From Anesthesia Perspective.     .

## 2024-03-05 NOTE — H&P
History & Physical - Short Stay  Gastroenterology      SUBJECTIVE:     Procedure: EUS    Chief Complaint/Indication for Procedure: Surveillance    History of Present Illness:  Patient is a 73 y.o. female presents for surveillance of gastric cancer S/P ESD.   PTA Medications   Medication Sig    acetaminophen (TYLENOL) 500 MG tablet     amLODIPine (NORVASC) 10 MG tablet TAKE 1 TABLET EVERY DAY    atorvastatin (LIPITOR) 20 MG tablet TAKE 1 TABLET EVERY DAY    B-complex with vitamin C (Z-BEC OR EQUIV) tablet Take 1 tablet by mouth once daily.     CALCIUM CITRATE-VITAMIN D2 ORAL Take 1 tablet by mouth once daily.     citalopram (CELEXA) 10 MG tablet TAKE 1 TABLET EVERY DAY    dextran 70-hypromellose (TEARS) ophthalmic solution Place 1 drop into the right eye every 6 (six) hours.    ELIQUIS 5 mg Tab TAKE 1 TABLET TWICE DAILY    ferrous gluconate (FERGON) 324 MG tablet TAKE 1 TABLET (324 MG TOTAL) BY MOUTH DAILY WITH BREAKFAST.    furosemide (LASIX) 80 MG tablet TAKE 1 TABLET TWICE DAILY    gabapentin (NEURONTIN) 300 MG capsule Take 2 capsules (600 mg total) by mouth 3 (three) times daily.    LIDOcaine-prilocaine (EMLA) cream Apply topically as needed (prior to port access).    lisinopriL (PRINIVIL,ZESTRIL) 40 MG tablet TAKE 1 TABLET EVERY DAY (Patient taking differently: every evening. TAKE 1 TABLET EVERY NIGHT)    multivitamin with minerals tablet Take 1 tablet by mouth once daily.     oxyCODONE-acetaminophen (PERCOCET) 5-325 mg per tablet Take 1 tablet by mouth every 8 (eight) hours as needed for Pain.    pantoprazole (PROTONIX) 40 MG tablet TAKE 1 TABLET EVERY DAY    semaglutide (OZEMPIC) 1 mg/dose (4 mg/3 mL) INJECT 1 MG INTO THE SKIN EVERY 7 DAYS.       Review of patient's allergies indicates:  No Known Allergies     Past Medical History:   Diagnosis Date    YOUNG (acute kidney injury) 10/15/2021    Anemia     2018    Arthritis     BMI 60.0-69.9, adult     Cataract     Chronic diastolic congestive heart failure  01/27/2021    Deep vein thrombosis     Depression     Dry eye syndrome     DVT of deep femoral vein, right 05/29/2023    Gastric adenocarcinoma 05/25/2021    GERD (gastroesophageal reflux disease)     Glaucoma suspect     History of gastric ulcer     History of psychiatric hospitalization     Hx of psychiatric care     Celexa, no recall of charted Effexor, Lexapro, Desyrel prescriptions    Hyperlipidemia     Hypertension     Hypothyroidism     Morbid obesity     Neuromuscular disorder     Sleep apnea     Thyroid disease      Past Surgical History:   Procedure Laterality Date    APPENDECTOMY      CARDIAC SURGERY      CATARACT EXTRACTION W/  INTRAOCULAR LENS IMPLANT Bilateral     MFIOL OU    CORONARY ANGIOGRAPHY Bilateral 02/24/2021    Procedure: ANGIOGRAM, CORONARY ARTERY;  Surgeon: Cresencio Ridley MD;  Location: [facility] CATH LAB;  Service: Cardiology;  Laterality: Bilateral;  Covid test needed - ok per Danay SSCU. Pt. w/o transportation or family    ENDOSCOPIC ULTRASOUND OF UPPER GASTROINTESTINAL TRACT N/A 03/17/2021    Procedure: ULTRASOUND, UPPER GI TRACT, ENDOSCOPIC;  Surgeon: Gerald Anaya MD;  Location: [facility] (2ND FLR);  Service: Endoscopy;  Laterality: N/A;  Pt unable to get a ride for swab. Will do rapid test at 8:30 - ttr    ENDOSCOPIC ULTRASOUND OF UPPER GASTROINTESTINAL TRACT N/A 01/13/2022    Procedure: ULTRASOUND, UPPER GI TRACT, ENDOSCOPIC;  Surgeon: Kevin Carballo MD;  Location: [facility] (2ND FLR);  Service: Endoscopy;  Laterality: N/A;  blood thinner approval received, see telephone encounter 1/7/22-BB  rapid  instructions given verbally and sent to address on file in pt's chart-BB    ENDOSCOPIC ULTRASOUND OF UPPER GASTROINTESTINAL TRACT N/A 10/11/2022    Procedure: ULTRASOUND, UPPER GI TRACT, ENDOSCOPIC;  Surgeon: Dequan Ridley MD;  Location: [facility] ENDO (2ND FLR);  Service: Endoscopy;  Laterality: N/A;    ENDOSCOPIC ULTRASOUND OF UPPER GASTROINTESTINAL TRACT N/A 1/24/2024    Procedure:  ULTRASOUND, UPPER GI TRACT, ENDOSCOPIC;  Surgeon: Kevin Carballo MD;  Location: [facility] ENDO (2ND FLR);  Service: Endoscopy;  Laterality: N/A;    ESOPHAGOGASTRODUODENOSCOPY N/A 02/05/2021    Procedure: EGD (ESOPHAGOGASTRODUODENOSCOPY);  Surgeon: Matthew Hernadez MD;  Location: [facility] ENDO (2ND FLR);  Service: Endoscopy;  Laterality: N/A;  BMI-58    Wt: 361#     COVID test [facility] on 2/2-GT    ESOPHAGOGASTRODUODENOSCOPY N/A 03/17/2021    Procedure: EGD (ESOPHAGOGASTRODUODENOSCOPY);  Surgeon: Gerald Anaya MD;  Location: [facility] ENDO (2ND FLR);  Service: Endoscopy;  Laterality: N/A;    ESOPHAGOGASTRODUODENOSCOPY N/A 05/25/2021    Procedure: EGD (ESOPHAGOGASTRODUODENOSCOPY);  Surgeon: Kevin Carballo MD;  Location: [facility] ENDO (2ND FLR);  Service: Endoscopy;  Laterality: N/A;  ESD 2 hours  Full COVID vaccination on file. ttr    ESOPHAGOGASTRODUODENOSCOPY N/A 01/13/2022    Procedure: EGD (ESOPHAGOGASTRODUODENOSCOPY);  Surgeon: Kevin Carballo MD;  Location: [facility] ENDO (2ND FLR);  Service: Endoscopy;  Laterality: N/A;  blood thinner approval, see telephone encounter 1/7/22-BB  rapid  instructions given verbally and sent to address on file in pt's chart-BB    ESOPHAGOGASTRODUODENOSCOPY N/A 10/11/2022    Procedure: EGD (ESOPHAGOGASTRODUODENOSCOPY);  Surgeon: Dequan Ridley MD;  Location: [facility] ENDO (2ND FLR);  Service: Endoscopy;  Laterality: N/A;  She is do for EGD/EUS now. Personal history of gastric cancer. Ca Coats #[id number].   Thanks,   Kevin Fuentes MD    Pt is fully vaccinated-DS  9/14/22-Approval to hold Davian rec'd from Dr. Pelaez (see telephone encounter 9/14/22)-DS  9/14/22-Ins    ESOPHAGOGASTRODUODENOSCOPY N/A 1/24/2024    Procedure: EGD (ESOPHAGOGASTRODUODENOSCOPY);  Surgeon: Kevin Carballo MD;  Location: [facility] ([address] [place] [address]);  Service: Endoscopy;  Laterality: N/A;  12/8/23: instructions sent via portal. eliquis approval from MD Pelaez in telephone encounter (12/12/23) -GD  1/9-elfego  complete-MS    EYE SURGERY      HYSTEROSCOPIC POLYPECTOMY OF UTERUS  1/10/2024    Procedure: POLYPECTOMY, UTERUS, HYSTEROSCOPIC;  Surgeon: Pina Pickens MD;  Location: [facility] OR;  Service: OB/GYN;;    HYSTEROSCOPY WITH DILATION AND CURETTAGE OF UTERUS N/A 1/10/2024    Procedure: HYSTEROSCOPY, WITH DILATION AND CURETTAGE OF UTERUS;  Surgeon: Pina Pickens MD;  Location: [facility] OR;  Service: OB/GYN;  Laterality: N/A;    INJECTION OF ANESTHETIC AGENT AROUND NERVE Left 07/10/2018    Procedure: BLOCK, NERVE;  Surgeon: Samantha Vidal MD;  Location: [facility] PAIN MGT;  Service: Pain Management;  Laterality: Left;  Genicular     INJECTION OF ANESTHETIC AGENT AROUND NERVE Left 07/19/2019    Procedure: Block, Nerve NERVE BLOCK GENICULAR WITH PHENOL 6%;  Surgeon: Samantha Vidal MD;  Location: [facility] PAIN MGT;  Service: Pain Management;  Laterality: Left;  NEEDS CONSENT    INSERTION OF TUNNELED CENTRAL VENOUS CATHETER (CVC) WITH SUBCUTANEOUS PORT N/A 07/09/2021    Procedure: INSERTION, PORT-A-CATH;  Surgeon: Mario Paz MD;  Location: [facility] CATH LAB;  Service: Radiology;  Laterality: N/A;  PORT PLACEMENT    RADIOFREQUENCY ABLATION Left 06/19/2020    Procedure: RADIOFREQUENCY ABLATION LEFT GENICULAR;  Surgeon: Tevin Clark MD;  Location: [facility] PAIN MGT;  Service: Pain Management;  Laterality: Left;  Left Genicular RFA    RADIOFREQUENCY ABLATION Left 01/22/2021    Procedure: RADIOFREQUENCY ABLATION LEFT GENICULAR;  Surgeon: Tevin Clark MD;  Location: [facility] PAIN MGT;  Service: Pain Management;  Laterality: Left;    RADIOFREQUENCY ABLATION Left 07/30/2021    Procedure: RADIOFREQUENCY ABLATION, GENICULAR COOLED NEED CONSENT;  Surgeon: Tevin Clark MD;  Location: [facility] PAIN MGT;  Service: Pain Management;  Laterality: Left;    RADIOFREQUENCY ABLATION Left 04/22/2022    Procedure: RADIOFREQUENCY ABLATION, GENICULAR COOLED , LEFT;  Surgeon: Tevin Clark MD;  Location: [facility] PAIN MGT;  Service: Pain Management;   "Laterality: Left;    RADIOFREQUENCY ABLATION Left 12/23/2022    Procedure: RADIOFREQUENCY ABLATION LEFT GENICULAR COOLED CLEARED TO HOLD ELIQUIS 3 DAYS  NEEDS CONSENT;  Surgeon: Tevin Clark MD;  Location: [facility];  Service: Pain Management;  Laterality: Left;    TONSILLECTOMY       Family History   Problem Relation Age of Onset    No Known Problems Mother     No Known Problems Father     Colon cancer Neg Hx     Esophageal cancer Neg Hx      Social History     Tobacco Use    Smoking status: Former     Types: Cigarettes     Start date: 2018    Smokeless tobacco: Never    Tobacco comments:     currently smoking <5 cigarettes most days   Substance Use Topics    Alcohol use: Yes     Comment: rarely    Drug use: No       Review of Systems:  Constitutional: no fever or chills  Respiratory: no cough or shortness of breath  Cardiovascular: no chest pain or palpitations    OBJECTIVE:     Vital Signs (Most Recent)       Physical Exam:  General: well developed, well nourished  Lungs:  normal respiratory effort  Heart: regular rate, S1, S2 normal    Laboratory  CBC: No results for input(s): "WBC", "RBC", "HGB", "HCT", "PLT", "MCV", "MCH", "MCHC" in the last 168 hours.  CMP: No results for input(s): "GLU", "CALCIUM", "ALBUMIN", "PROT", "NA", "K", "CO2", "CL", "BUN", "CREATININE", "ALKPHOS", "ALT", "AST", "BILITOT" in the last 168 hours.  Coagulation: No results for input(s): "LABPROT", "INR", "APTT" in the last 168 hours.    Diagnostic Results:      ASSESSMENT/PLAN:     Gastric cancer surveillance    Plan: EUS    Anesthesia Plan: MAC    ASA Grade: ASA 3 - Patient with moderate systemic disease with functional limitations    The impression and plan was discussed in detail with the patient. All questions have been answered and the patient voices understanding of our plan at this point. The risk of the procedure was discussed in detail which includes but not limited to bleeding, infection, perforation in some cases " requiring surgery with its spectrum of complications.

## 2024-03-05 NOTE — TRANSFER OF CARE
"Anesthesia Transfer of Care Note    Patient: Ca Coats    Procedure(s) Performed: Procedure(s) (LRB):  ULTRASOUND, UPPER GI TRACT, ENDOSCOPIC (N/A)  EGD (ESOPHAGOGASTRODUODENOSCOPY) (N/A)    Patient location: [facility]    Anesthesia Type: general    Transport from OR: Transported from OR on 2-3 L/min O2 by NC with adequate spontaneous ventilation    Post pain: adequate analgesia    Post assessment: no apparent anesthetic complications    Post vital signs: stable    Level of consciousness: awake    Nausea/Vomiting: no nausea/vomiting    Complications: none    Transfer of care protocol was followed      Last vitals: Visit Vitals  /66 (Patient Position: Lying)   Pulse 76   Temp 36.6 °C (97.9 °F) (Temporal)   Resp 16   Ht 5' 6" (1.676 m)   Wt 118.4 kg (261 lb)   SpO2 97%   Breastfeeding No   BMI 42.13 kg/m²     "

## 2024-03-06 NOTE — ANESTHESIA POSTPROCEDURE EVALUATION
Anesthesia Post Evaluation    Patient: Ca Coats    Procedure(s) Performed: Procedure(s) (LRB):  ULTRASOUND, UPPER GI TRACT, ENDOSCOPIC (N/A)  EGD (ESOPHAGOGASTRODUODENOSCOPY) (N/A)    Final Anesthesia Type: general      Patient location during evaluation: [facility]  Patient participation: Yes- Able to Participate  Level of consciousness: awake and alert  Post-procedure vital signs: reviewed and stable  Pain management: adequate  Airway patency: patent  LAURA mitigation strategies: Multimodal analgesia, Preoperative use of mandibular advancement devices or oral appliances and Intraoperative administration of CPAP, nasopharyngeal airway, or oral appliance during sedation  PONV status at discharge: No PONV  Anesthetic complications: no      Cardiovascular status: blood pressure returned to baseline, hemodynamically stable and stable  Respiratory status: unassisted and spontaneous ventilation  Hydration status: euvolemic  Follow-up not needed.              Vitals Value Taken Time   /74 03/05/24 1202   Temp 36.4 °C (97.5 °F) 03/05/24 1210   Pulse 80 03/05/24 1211   Resp 16 03/05/24 1211   SpO2 97 % 03/05/24 1210   Vitals shown include unvalidated device data.      No case tracking events are documented in the log.      Pain/Hector Score: Hector Score: 10 (3/5/2024 12:10 PM)

## 2024-03-07 LAB
FINAL PATHOLOGIC DIAGNOSIS: NORMAL
GROSS: NORMAL
Lab: NORMAL

## 2024-03-17 ENCOUNTER — HOSPITAL ENCOUNTER (OUTPATIENT)
Dept: RADIOLOGY | Facility: HOSPITAL | Age: 74
Discharge: HOME | End: 2024-03-17
Attending: NEUROLOGICAL SURGERY
Payer: MEDICARE

## 2024-03-17 DIAGNOSIS — I67.1 CEREBRAL ANEURYSM, NONRUPTURED: ICD-10-CM

## 2024-03-17 PROCEDURE — 25500020 PHARM REV CODE 255: Mod: HCNC | Performed by: NEUROLOGICAL SURGERY

## 2024-03-17 PROCEDURE — 70496 CT ANGIOGRAPHY HEAD: CPT | Mod: 26,HCNC,, | Performed by: RADIOLOGY

## 2024-03-17 PROCEDURE — 70498 CT ANGIOGRAPHY NECK: CPT | Mod: 26,HCNC,, | Performed by: RADIOLOGY

## 2024-03-17 PROCEDURE — 70496 CT ANGIOGRAPHY HEAD: CPT | Mod: TC,HCNC

## 2024-03-17 PROCEDURE — A9585 GADOBUTROL INJECTION: HCPCS | Mod: HCNC | Performed by: NEUROLOGICAL SURGERY

## 2024-03-17 PROCEDURE — 70546 MR ANGIOGRAPH HEAD W/O&W/DYE: CPT | Mod: TC,HCNC

## 2024-03-17 PROCEDURE — 70546 MR ANGIOGRAPH HEAD W/O&W/DYE: CPT | Mod: 26,HCNC,, | Performed by: RADIOLOGY

## 2024-03-17 RX ORDER — GADOBUTROL 604.72 MG/ML
10 INJECTION INTRAVENOUS
Status: COMPLETED | OUTPATIENT
Start: 2024-03-17 | End: 2024-03-17

## 2024-03-17 RX ADMIN — GADOBUTROL 10 ML: 604.72 INJECTION INTRAVENOUS at 09:03

## 2024-03-17 RX ADMIN — IOHEXOL 100 ML: 350 INJECTION, SOLUTION INTRAVENOUS at 10:03

## 2024-03-19 ENCOUNTER — TELEPHONE (OUTPATIENT)
Dept: NEUROSURGERY | Facility: CLINIC | Age: 74
End: 2024-03-19
Payer: MEDICARE

## 2024-03-19 NOTE — TELEPHONE ENCOUNTER
Returned call to pt. Explained that the next step will be angiogram. Once she have the angiogram done, will schedule pt a follow up appointment to go over the result. Informed that Dr. Simon's nurse is working on getting the angiogram scheduled, she will contact pt when she have the date and time. Pt v/u

## 2024-03-19 NOTE — TELEPHONE ENCOUNTER
----- Message from Maricel Sheridan sent at 3/19/2024 10:17 AM CDT -----  Regarding: Results  Contact: Pt  [phone]  Pt is calling to speak to staff about results from test please call

## 2024-03-25 ENCOUNTER — TELEPHONE (OUTPATIENT)
Dept: NEUROSURGERY | Facility: CLINIC | Age: 74
End: 2024-03-25
Payer: MEDICARE

## 2024-03-25 NOTE — TELEPHONE ENCOUNTER
----- Message from Leonor Vo sent at 3/25/2024 10:03 AM CDT -----  Regarding: pt advice  Contact: pt@ [phone]  Pt requesting a call back to discuss plan of care also requesting a call back to schedule a appt please call pt @[phone]

## 2024-03-25 NOTE — TELEPHONE ENCOUNTER
Returned call to pt. Explained that Dr. Simon's nurse is still working on getting the angiogram scheduled. Informed that once she has the day and time she will let pt know. Pt would like to know if she can have knee surgery before the angiogram. Informed that we will ask Dr. Simon and will let her know. Informed pt that Dr. Simon is out of office until 4/9 so we will get back to her after 4/9/24. Pt v/u

## 2024-03-26 DIAGNOSIS — G89.4 CHRONIC PAIN SYNDROME: ICD-10-CM

## 2024-03-26 NOTE — TELEPHONE ENCOUNTER
Patient requesting refill on oxycodone  Last office visit 11/01/2023   shows last refill on 03/05/24  Patient does have a pain contract on file with [facility] department  Patient last UDS 8/16/2022 was consistent with current therapy     CODEINE   Not Detected Not Detected   MORPHINE   Not Detected Not Detected   6-ACETYLMORPHINE   Not Detected Not Detected   OXYCODONE   Present Present   NOROYXCODONE   Present Present   OXYMORPHONE   Present Not Detected   NOROXYMORPHONE   Present Not Detected   HYDROCODONE   Not Detected Not Detected   NORHYDROCODONE   Not Detected Not Detected   HYDROMORPHONE   Not Detected Not Detected   BUPRENORPHINE   Not Detected Not Detected   NORUBPRENORPHINE   Not Detected Not Detected   FENTANYL   Not Detected Not Detected   NORFENTANYL   Not Detected Not Detected   MEPERIDINE METABOLITE   Not Detected Not Detected   TAPENTADOL   Not Detected Not Detected   TAPENTADOL-O-SULF   Not Detected Not Detected   METHADONE   Not Detected Not Detected   TRAMADOL   Not Detected Not Detected   AMPHETAMINE   Not Detected Not Detected   METHAMPHETAMINE   Not Detected Not Detected   MDMA- ECSTASY   Not Detected Not Detected   MDA   Not Detected Not Detected   MDEA- Genoveva   Not Detected Not Detected   METHYLPHENIDATE   Not Detected Not Detected   PHENTERMINE   Not Detected Not Detected   BENZOYLECGONINE   Not Detected Not Detected   ALPRAZOLAM   Not Detected Not Detected   ALPHA-OH-ALPRAZOLAM   Not Detected Not Detected   CLONAZEPAM   Not Detected Not Detected   7-AMINOCLONAZEPAM   Not Detected Not Detected   DIAZEPAM   Not Detected Not Detected   NORDIAZEPAM   Not Detected Not Detected   OXAZEPAM   Not Detected Not Detected   TEMAZEPAM   Not Detected Not Detected   Lorazepam   Not Detected Not Detected   MIDAZOLAM   Not Detected Not Detected   ZOLPIDEM   Not Detected Not Detected   BARBITURATES   Not Detected Not Detected   Creatinine, Urine 20.0 - 400.0 mg/dL 205.6 102.0   ETHYL  GLUCURONIDE   Present Not Detected   MARIJUANA METABOLITE   Not Detected Not Detected   PCP   Not Detected Not Detected   CARISOPRODOL   Not Detected Not Detected CM   Comment: The carisoprodol immunoassay has cross-reactivity to  carisoprodol and meprobamate.   Naloxone   Not Detected     Gabapentin   Present     Pregabalin   Not Detected     Alpha-OH-Midazolam   Not Detected     Zolpidem Metabolite   Not Detected     PAIN MANAGEMENT DRUG PANEL   See Below See Below CM

## 2024-03-26 NOTE — TELEPHONE ENCOUNTER
----- Message from Franny Patel sent at 3/26/2024  1:25 PM CDT -----      Can the clinic reply in [facility]:Y        Please refill the medication(s) listed below. Please call the patient when the prescription(s) is ready for  at this phone number         Medication #1 oxyCODONE-acetaminophen (PERCOCET) 5-325 mg per tablet    Medication #2       Preferred Pharmacy: [facility]

## 2024-03-27 RX ORDER — OXYCODONE AND ACETAMINOPHEN 5; 325 MG/1; MG/1
1 TABLET ORAL EVERY 8 HOURS PRN
Qty: 90 TABLET | Refills: 0 | Status: SHIPPED | OUTPATIENT
Start: 2024-03-27 | End: 2024-04-30 | Stop reason: SDUPTHER

## 2024-03-27 NOTE — TELEPHONE ENCOUNTER
----- Message from Irina Torrez sent at 3/27/2024 10:15 AM CDT -----  Regarding: refill  Name of caller: natalie       What is the requesting detail: pt is requesting a refill for oxyCODONE-acetaminophen (PERCOCET) 5-325 mg per tablet. Pt is out of medication. Needs a refill before 4-16. Please advise           Can the clinic reply by [facility]:       What number to call back:[phone]

## 2024-03-27 NOTE — TELEPHONE ENCOUNTER
Pt refill request is pending to her provider.      ----- Message from Irina Torrez sent at 3/27/2024 10:15 AM CDT -----  Regarding: refill  Name of caller: natalie       What is the requesting detail: pt is requesting a refill for oxyCODONE-acetaminophen (PERCOCET) 5-325 mg per tablet. Pt is out of medication. Needs a refill before 4-16. Please advise           Can the clinic reply by [facility]:       What number to call back:[phone]

## 2024-04-03 ENCOUNTER — TELEPHONE (OUTPATIENT)
Dept: PAIN MEDICINE | Facility: CLINIC | Age: 74
End: 2024-04-03
Payer: MEDICARE

## 2024-04-03 NOTE — TELEPHONE ENCOUNTER
----- Message from Yvonne Edwin sent at 4/3/2024 10:53 AM CDT -----  Regarding: Rx  Name of Who is Calling:  Patient          What is the request in detail:  Patient  stated she was told to make an appointment to have her Rx oxyCODONE-acetaminophen (PERCOCET) 5-325 mg per tablet refill. Patient has an appointment for 04/16/2024            Can the clinic reply by [facility]: No            What Number to Call Back if not in [facility]: [phone]

## 2024-04-03 NOTE — TELEPHONE ENCOUNTER
Staff called pt to let her know she will receive her next refill on her in office visit with . pt did not answer staff left a voice message.      ----- Message from Yvonne Pineda sent at 4/3/2024 10:53 AM CDT -----  Regarding: Rx  Name of Who is Calling:  Patient          What is the request in detail:  Patient  stated she was told to make an appointment to have her Rx oxyCODONE-acetaminophen (PERCOCET) 5-325 mg per tablet refill. Patient has an appointment for 04/16/2024            Can the clinic reply by [facility]: No            [facility]: [phone]

## 2024-04-04 DIAGNOSIS — I10 ESSENTIAL HYPERTENSION: ICD-10-CM

## 2024-04-04 NOTE — TELEPHONE ENCOUNTER
No care due was identified.  [facility] Embedded Care Due Messages. Reference number: [id number].   4/04/2024 10:33:33 AM CDT

## 2024-04-05 RX ORDER — LISINOPRIL 40 MG/1
40 TABLET ORAL DAILY
Qty: 90 TABLET | Refills: 0 | Status: SHIPPED | OUTPATIENT
Start: 2024-04-05 | End: 2024-06-18

## 2024-04-05 RX ORDER — AMLODIPINE BESYLATE 10 MG/1
10 TABLET ORAL DAILY
Qty: 90 TABLET | Refills: 0 | Status: SHIPPED | OUTPATIENT
Start: 2024-04-05 | End: 2024-06-18

## 2024-04-05 NOTE — TELEPHONE ENCOUNTER
Refill Decision Note   Ca Tejedajonatanon  is requesting a refill authorization.  Brief Assessment and Rationale for Refill:  Approve     Medication Therapy Plan:         Comments:     Note composed:11:37 AM 04/05/2024

## 2024-04-10 ENCOUNTER — TELEPHONE (OUTPATIENT)
Dept: NEUROSURGERY | Facility: CLINIC | Age: 74
End: 2024-04-10
Payer: MEDICARE

## 2024-04-10 NOTE — TELEPHONE ENCOUNTER
Received a call from pt. Wanting to know when angiogram will be scheduled. Explained to pt that she requires anesthesia and next anesthesia slots will be in May. She is on the list. Pt frustrated but vu.

## 2024-04-15 NOTE — TELEPHONE ENCOUNTER
Called pt and informed that she can have the knee surgery done prior to the angiogram. Informed that we do not know the date and the time yet. Once Dr. Simon's nurse get it scheduled, she will informed pt.

## 2024-04-16 ENCOUNTER — OFFICE VISIT (OUTPATIENT)
Dept: PAIN MEDICINE | Facility: CLINIC | Age: 74
End: 2024-04-16
Payer: MEDICARE

## 2024-04-16 VITALS
SYSTOLIC BLOOD PRESSURE: 125 MMHG | DIASTOLIC BLOOD PRESSURE: 74 MMHG | BODY MASS INDEX: 42.77 KG/M2 | WEIGHT: 266.13 LBS | HEART RATE: 75 BPM | HEIGHT: 66 IN

## 2024-04-16 DIAGNOSIS — M25.562 CHRONIC PAIN OF LEFT KNEE: Primary | ICD-10-CM

## 2024-04-16 DIAGNOSIS — G89.29 CHRONIC PAIN OF LEFT KNEE: Primary | ICD-10-CM

## 2024-04-16 DIAGNOSIS — M17.12 PRIMARY OSTEOARTHRITIS OF LEFT KNEE: ICD-10-CM

## 2024-04-16 DIAGNOSIS — Z51.81 ENCOUNTER FOR MONITORING OPIOID MAINTENANCE THERAPY: ICD-10-CM

## 2024-04-16 DIAGNOSIS — Z79.891 ENCOUNTER FOR MONITORING OPIOID MAINTENANCE THERAPY: ICD-10-CM

## 2024-04-16 PROCEDURE — 3074F SYST BP LT 130 MM HG: CPT | Mod: HCNC,CPTII,S$GLB, | Performed by: ANESTHESIOLOGY

## 2024-04-16 PROCEDURE — 3078F DIAST BP <80 MM HG: CPT | Mod: HCNC,CPTII,S$GLB, | Performed by: ANESTHESIOLOGY

## 2024-04-16 PROCEDURE — 3008F BODY MASS INDEX DOCD: CPT | Mod: HCNC,CPTII,S$GLB, | Performed by: ANESTHESIOLOGY

## 2024-04-16 PROCEDURE — 4010F ACE/ARB THERAPY RXD/TAKEN: CPT | Mod: HCNC,CPTII,S$GLB, | Performed by: ANESTHESIOLOGY

## 2024-04-16 PROCEDURE — 3288F FALL RISK ASSESSMENT DOCD: CPT | Mod: HCNC,CPTII,S$GLB, | Performed by: ANESTHESIOLOGY

## 2024-04-16 PROCEDURE — 1101F PT FALLS ASSESS-DOCD LE1/YR: CPT | Mod: HCNC,CPTII,S$GLB, | Performed by: ANESTHESIOLOGY

## 2024-04-16 PROCEDURE — 80307 DRUG TEST PRSMV CHEM ANLYZR: CPT | Mod: HCNC | Performed by: ANESTHESIOLOGY

## 2024-04-16 PROCEDURE — 1125F AMNT PAIN NOTED PAIN PRSNT: CPT | Mod: HCNC,CPTII,S$GLB, | Performed by: ANESTHESIOLOGY

## 2024-04-16 PROCEDURE — 1160F RVW MEDS BY RX/DR IN RCRD: CPT | Mod: HCNC,CPTII,S$GLB, | Performed by: ANESTHESIOLOGY

## 2024-04-16 PROCEDURE — 80326 AMPHETAMINES 5 OR MORE: CPT | Mod: HCNC | Performed by: ANESTHESIOLOGY

## 2024-04-16 PROCEDURE — 1159F MED LIST DOCD IN RCRD: CPT | Mod: HCNC,CPTII,S$GLB, | Performed by: ANESTHESIOLOGY

## 2024-04-16 PROCEDURE — 99999 PR PBB SHADOW E&M-EST. PATIENT-LVL III: CPT | Mod: PBBFAC,HCNC,, | Performed by: ANESTHESIOLOGY

## 2024-04-16 PROCEDURE — 99214 OFFICE O/P EST MOD 30 MIN: CPT | Mod: HCNC,GC,S$GLB, | Performed by: ANESTHESIOLOGY

## 2024-04-16 PROCEDURE — 3044F HG A1C LEVEL LT 7.0%: CPT | Mod: HCNC,CPTII,S$GLB, | Performed by: ANESTHESIOLOGY

## 2024-04-16 NOTE — PROGRESS NOTES
"                                                                                                                                                               Chronic Pain Medicine Established Clinic Visit     Initial HPI:  Ca Coats is a 69 y.o. female who presents today with left knee pain. Her pain has been going on for "too long."  She was previously treated by Dr. Iyer at [facility].  She has injections [id number] with him.  These were helpful, but she does not know if the injections were steroid or viscosupplementation.   This pain is described in detail below.    Interval History 4/16/2024:  Patient returns to clinic for follow up of L knee pain. Patient states that she was supposed to have knee surgery with Dr. Hensley on Feb 7, 2024, however procedure was aborted due to multiple cerebral aneurysms. She is now following up with Dr. Washington, neurointensivist, for further workup with plan for angiogram under anesthesia in May. Patient reports L knee pain is the same, she reports continuing to take percocet 5/325mg TID and gabapentin, providing pain relief.  Plan for UDS at appointment today per previous note. Patient reports pain is 7/10 at time of visit.  She reports there were previous discussion for synvisc injection at previous pain management appointments however that was not completed. Per further review, patient has had previous genicular nerve RFA approximately 1 year ago, repeat RFA was planned however not approved by patient's insurance.    Interval History (11/01/2023):  72-year-old female that presents today for a follow-up appointment she is in a wheelchair accompanied by her daughter she is scheduled for a repeat Left genicular RFA with Dr. Mendiola  however this procedure was canceled due to her insurance denying the procedure. She is here to discuss other PM procedures to help with her chronic left knee pain.  Pain is described as aching and throbbing, is worse when prolonged " walking     Interval History (4/30/2018):  The patient returns to clinic today for follow up and records review. We did received records from [facility] including a MRI of her knee. Dr. Iyer's last office visit note from January 2017 does not include any previous injections. She continues to report bilateral knee pain, left greater than right. She describes this pain as constant and aching. This pain is worse with prolonged walking. She also report right shoulder pain with prolonged overhead activity. She continues to take Percocet with benefit. She denies any other health changes.     Interval History (5/31/2018):  The patient returns to clinic for follow up. She is s/p left knee Synvisc One injection on 5/8/2018. She reports 35% relief of her knee pain. She continues to report bilateral knee pain, left greater than right. She describes this pain as constant and aching. Her pain is worse with prolonged walking and standing. She is scheduled to see Orthopedics at the  End of this month. She is also following up with Bariatrics to discuss the lap band procedure. She continues to take Percocet with benefit. She also uses Voltaren gel with benefit but this is expensive for her. She denies any other health changes.          Interval History (7/2/2018):  The patient returns to clinic today for follow up. She continues to report left knee pain that is constant, sharp, and aching in nature. Her pain is worse with prolonged walking and standing. She is scheduled for weight loss surgery in August. She continues follow up with orthopedics who does not recommend surgery at this time due to her BMI. She continues to take Percocet as needed with benefit. She denies any other health changes.      Interval History (7/19/2018):  The patient returns to clinic today for follow up. She is s/p left genicular nerve block on 7/10/2018. She reports 80% relief of her left knee pain. She reports that she was able to walk for longer  "distances (3 blocks) after the block. Her pain has returned to baseline. She continues to report left knee pain that is constant, sharp, and aching in nature. Her pain is worse with prolonged walking and standing. She denies any other health changes.      Interval History (8/21/2018):  The patient returns to clinic today for follow up. She is s/p left genicular cooled RFA on 7/31/2018. She reports 60% relief of her left knee pain. She reports intermittent knee pain that is sore and aching in nature. She continues to perform a home exercise routine. She is actively trying to lose weight. She continues to follow up with Bariatric Surgery at [facility]. She is considering weight loss surgery. She continues to use compound cream with benefit. She denies any other health changes.      Interval History (11/21/2018):  The patient returns to clinic today for follow up. She reports increased left knee pain this past week. She reports 2 episodes where her knee has "locked" up on her while walking. She describes her knee pain as sore and aching. She is actively trying to lose weight and quit smoking. She continues to use the compound cream with benefit. She denies any other health changes.      Interval History (1/9/2019):  The patient returns to clinic today for follow up. She is s/p left knee steroid injection on 12/21/2018. She reports one day of relief. She continues to report left knee pain that is constant, sharp, and aching in nature. Her pain is worse with standing and walking. She reports that her knee "locks" up on her while walking. She often feels as though she is going to fall. She is scheduled for bariatric weight loss surgery in February at [facility]. She denies any other health changes.      Interval History (2/28/2019):  The patient returns to clinic today for follow up. She is s/p Euflexxa series completed on 1/30/2019. She reports that she able to stand for longer periods of time since the procedure. She continues " to report left knee pain that is sore and aching in nature. Her pain is worse with prolonged standing and walking. She was previously scheduled for weight loss surgery but reports this was canceled. She is scheduled for follow up next week about this. She denies any other health changes.      Interval History (4/12/2019):  The patient returns for f/u of left knee pain.  She is s/p left genicular cooled RFA with about 90% pain relief.  She has been able to walk easier.  She also notices less stiffness in her knee.  She is planning on gastric surgery prior to knee replacement.  She had benefit with compounding cream but it is no longer covered by her insurance.  She does take Percocet from her PCP.  Her pain today is 7/10.     Interval History (7/11/19):  Patient reported to ED today for increased L knee pain and an episode of LLE weakness that lead to a near fall. Patient is s/p L genicular RFA in 3/19/19 that provided 90% relief for 2 months then came back. Pain is a crunchy pain, in left knee, that does not radiate, worse with movement, better with rest. Pain today is 9/10. She normally uses cane to ambulate but is currently using wheelchair at hospital. Denies any trauma ot the area, fever/chills, n/v, abdominal pain, or HA.     Interval History (8/8/2019):  She returns today for follow up s/p left knee genicular chemodenervation with phenol 7/19/19.  She reports that this procedure did not provide any improvement in her left knee symptoms, and she is still having significant difficulty with ambulation, still uses wheelchair for mobility. She also reports left lateral thigh and hip pain lateral upper thigh numbness. She has been admitted to [facility] but is not receiving PT there. She is still considering lap band surgery but has missed a followup appointment.      Interval History (9/4/2019):  The patient returns to clinic today for follow up. She continues to report intermittent left lateral thigh and  hip pain that is tingling and numb in nature. She continues to report left knee pain. She continues to have difficulty ambulating due to pain. She is currently living in a SNF. She continues to take Gabapentin 300 mg BID. This was increased at last visit but not increased at the nursing home. She does report benefit with Percocet though it only last approximately 4-5 hours. She denies any adverse effects. She does present with a care attendant from the SNF today. Her pain today is 10/10.     Interval History (10/4/2019):  The patient returns to clinic today for follow up and medication refill. She continues to report bilateral knee pain that is aching in nature. She reports intermittent left lateral thigh pain that is tingling and shooting in nature. She reports that her pain has been improving since last visit. She has increased her activity. She continues to report benefit with Gabapentin. She continues to take Percocet as needed with benefit. She denies any other health changes. Her pain today is 0/10.     Interval History (1/6/2020):  The patient returns to clinic today for follow up. She reports increased left leg pain today that is tingling in nature. She continues to report bilateral knee pain, left greater than right. She describes this pain as constant, sharp, and aching. She is no longer living at the nursing facility. She has increased her home exercise routine. She is actively trying to quit smoking in order to move forward with bariatric surgery. She continues to report benefit with current medication regimen. She takes Gabapentin and Percocet with benefit and without adverse effect. She denies any other health changes. Her pain today is 7/10.     Interval History (4/8/2020):  She returns today for follow up via audio.  She reports that the methocarbamol is not helping.  She continues to have a left-sided low back pain that is bothering her.  Her left knee continues to hurt.  Her right knee is hurting a  little as well.  Percocet continues to help, but it is not lasting long enough.     Interval History (5/1/2020):  The patient returns for audio visit today for follow up. She continues to report bilateral knee pain, left greater than right. Her pain is worse with prolonged standing and walking. She reports that her back pain has improved. She continues to report benefit with current medication regimen. She continues to take Gabapentin and Percocet with benefit and without adverse effects. She denies any other health changes. Her pain today is 8/10.     Interval History (6/2/2020):  The patient returns to clinic today via audio visit for follow up of knee pain. She reports increased left knee pain in the last week. Her pain is worse with standing and walking. She feels as though she will fall when standing. She is currently using ice, heat, and OTC lidocaine patches with limited relief. She continues to take Gabapentin and Percocet with some benefit. She denies any adverse effects. She denies any other health changes. Her pain today is 10/10.     Interval History 7/8/2020:  Ca Coats presents to the clinic for a follow-up appointment for follow up after 6/19/20 RFA Since the last visit, Ca Coats states the pain has been persistant. Current pain intensity is 10/10.    Interval History 8/11/2020:  The patient returns to clinic today for follow up of knee pain. She is s/p left knee Orthovisc injection on 7/28/2020. She reports limited relief at this time. She continues to report left knee pain, especially with standing and walking. She feels as though her knee will buckle. She has recently quit smoking. She is actively trying to lose weight. She was recently started on Ozempic per her PCP. She is following her diet plan. She continues to take Percocet with benefit but reports that it does not last. She denies any adverse effects with this medication. She denies any other health changes.  Her pain today is 8/10.    Interval History 9/3/2020:  The patient returns to clinic today for follow up of knee pain. She reports some relief with left knee Orthovisc injection in July. She continues to report left knee pain that is sharp in nature. This pain is worse with standing and walking. She feels as though her knee will give out with prolonged standing. She is actively trying to lose weight but is frustrated with lack of progress. She is following her diet plan. She continues to Percocet with benefit but it does not last. She denies any adverse effects. She denies any other health changes. Her pain today is 7/10.    Interval History 12/1/2020:  The patient returns to clinic today for follow up of knee pain. She reports increased left knee pain over the last few weeks. She also reports that her left knee feels weaker over the last month. She feels as thought it will give out with standing and walking. She would like to repeat RFA as she feels this helps her feel more stable and decreases her pain. She continues to take Percocet with some benefit but asks if this can be stronger. She denies any adverse effects with this medication. She continues to follow up with Bariatrics. She continues to follow a diet plan and take Ozempic. She denies any other health changes. Her pain today is 10/10.    Interval History 3/24/2021:  The patient returns to clinic today for follow up of knee pain. She is s/p left genicular RFA on 1/22/2021. She reports some benefit of her left knee pain. She is able to stand and walking for longer periods of time. She continues to report left knee pain. Since last visit, she had angiogram that was normal. She has discontinued anticoagulation. She also had a recent EGD which she is awaiting the results. She continues to see Bariatrics. She is actively trying to lose weight. She continues to take Gabapentin with benefit. She continues to take Percocet with benefit and without adverse effects.  She denies any other health changes. Her pain today is 0/10.    Interval History 6/1/2021:  The patient returns to clinic today for follow up of knee pain. She reports increased left knee pain over the last two weeks. She has increased pain with standing and walking. She states that she can feel the bones rubbing together. She continues to follow up with Bariatrics. She is actively trying to lose weight. She continues to follow up with GI. She is hoping to pursue gastric bypass. She continues to take Gabapentin with benefit. She continues to take Percocet as needed with benefit and without adverse effects. She denies any other health changes. Her pain today is 6/10.    Interval History 7/27/2021:  DARCY Coats presents to the clinic for a follow-up appointment. Since the last visit, DARCY Coats states the pain has been worsening. Current pain intensity is 7/10. Patient had an RFA in the left knee in January 2021. She had really good relief until about June. She was given an hyaulorinic injection at that time. She had good relief for a few weeks but now she is back to the same amount of pain as before that injection. She is having pain with standing and walking. She is continuing to take percocet and it completely gets rid of her pain      Of note, patient was found to have gastric cancer. She is currently on chemotherapy. The plan is to continue chemo until the cancer can be surgically removed.     Interval History 9/14/2021:  The patient returns to clinic today for follow up of knee pain via audio visit. She is s/p left genicular RFA on 7/30/2021. She reports 50% relief of her knee pain. She continues to report left knee pain with prolonged standing and walking. Since last visit, she has developed a breast abscess. This was drained yesterday. She is on antibiotics. She continues to undergo chemotherapy for gastric cancer. She continues to take Gabapentin with benefit. She continues to take Percocet with  benefit and without adverse effects. She denies any other health changes. Her pain today is 2/10.     Interval History 11/9/2021:  The patient returns to clinic today for follow up of knee pain. She reports increased left knee pain, worse with standing and walking. Since last visit, she was admitted to the hospital for diarrhea and dehydration. She continues to undergo chemotherapy treatment for gastric cancer. She continues to take Gabapentin with benefit. She continues to take Percocet as needed with benefit and without adverse effects. She denies any other health changes. Her pain today is 7/10.       Interval History 12/28/2021:  The patient returns to clinic today for follow up of knee pain. She is s/p left knee Monovisc injection on 12/14/2021. She reports 70% relief of her left knee pain. She continues to report intermittent left knee pain, worse with standing and walking. She reports bilateral feet swelling, left greater than right. She has not seen her PCP. She has completed chemotherapy. She is scheduled for EGD in January to monitor tumor size with possible resection. She will likely undergo chemotherapy again after the scope. She continues to take Gabapentin with benefit. She continues to take Percocet as needed with benefit and without adverse effects. She denies any other health changes. Her pain today is 0/10.    Interval History 3/21/2022:  The patient returns to clinic today for follow up of knee pain. She report increased left knee pain, worse with prolonged standing and walking. She did have repeat EGD with biopsies done. She is currently undergoing chemotherapy and radiation. She continues to take Percocet as needed with benefit and without adverse effects. She denies any other health changes. Her pain today is 0/10.    Interval History 5/16/2022:  The patient returns to clinic today for follow up of knee pain. She is s/p left genicular RFA on 4/22/2022. She reports 80% relief of her knee pain.  She continues to report intermittent left knee pain. Her pain is worse with prolonged activity. She has completed radiation and chemotherapy. She is awaiting further testing to determine plan. She continues to take Percocet as needed with benefit and without adverse effects. She denies any other health changes. Her pain today is 2/10.    Interval History 8/16/2022:  The patient returns to clinic today for follow up of knee pain. She continues to report left knee pain. Her pain is much improved since RFA. Her pain is worse with prolonged standing and walking. She reports intermittent right lower leg pain, below her knee. She continues to follow up with Hem/Onc for gastric cancer. She continues to report benefit with Percocet. This allows her to perform her ADLs. She denies any adverse effects. She denies any other health changes. Her pain today is 0/10.    Interval History 11/16/2022:  The patient returns to clinic today for follow up of knee pain. She reports increased left knee pain over the last few weeks. Her pain is worse with prolonged standing, walking, and activity. She denies any right knee pain. She continues to follow up with Hem/Onc. She continues to report benefit with current medication regimen. She continues to take Gabapentin. She continues to take Percocet as needed with benefit and without adverse effects. She denies any other health changes. Her pain today is 6/10.    Interval History 1/13/2023:  The patient returns to clinic today for follow up of knee pain. She is s/p left genicular RFA on 12/23/2022. She reports 75-80% relief of her left knee pain. She reports intermittent knee pain, worse with prolonged activity. She continues to take Gabapentin. She continues to take Percocet as needed with benefit. She denies any adverse effects. She denies any other health changes. Her pain today is 0/10.    Interval History 5/1/2023:  The patient returns to clinic today for follow up of knee pain. She  continues to report left knee pain. Her pain is worse with prolonged activity. She is taking Gabapentin. She also takes Percocet as needed with benefit and without adverse effects. She denies any other health changes. Her pain today is 0/10.    Interval History 8/28/23 (Virtual/Dr. Olson)  72 year old female returns today in follow up. Previously a patient of Dr. Thacker but has since been a patient of Dr. Clark. Today is the first visit with Dr. Olson. Patient reports left knee pain that is persistent. She had left knee genicular nerve RFA with Dr. Clark 12/23/22. She reports 75-80% relief of her left knee pain for 7 months. Pain has gradually returned of similar character, location, description. No recent falls. No new weakness or sensation changes. We reviewed imaging which is consistent with medial joint space narrowing, which is where her pain is. She would like to repeat the RFA.       Pain Disability Index Review:      5/1/2023     9:54 AM 1/13/2023    10:29 AM 11/16/2022     9:47 AM   Last 3 PDI Scores   Pain Disability Index (PDI) 20 14 27         Physical Therapy: Yes, she did this in 2018 for one month (twice a week). She does HEP (stretching and ROM in the morning)      Pain Medications:    Current:  Gabapentin, tylenol 500mg Q6 prn, Percocet 5/325 mg TID PRN    Opioid Contract: yes     report:  Reviewed and consistent with medication use as prescribed.    Pain Procedures:   5/8/2018- Left knee Synvisc One injection  7/10/2018- Left genicular nerve block  7/31/2018- Left genicular cooled RFA  12/21/2018- Left knee steroid injection  1/31/2019- Left knee Euflexxa series  3/19/2019 Left genicular cooled RFA- 90% relief  7/19/19- Left knee genicular phenol chemodenervation-  6/19/2020- Left genicular RFA  1/22/2021- Left genicular RFA  6/2021- Left knee Orthovisc injection  7/30/2021- Left genicular RFA  12/14/2021- Left knee Monovisc injection  4/22/2022- Left genicular RFA  12/23/2022- Left  genicular RFA- 75-80% relief    Physical Therapy//Home Exercise: yes, she did this in 2018 for one month (twice a week). She does HEP (stretching and ROM in the morning)    Imaging:     XRAY KNEE 1/18/24:     Rm 20; Unilateral primary osteoarthritis, left knee     TECHNIQUE:  One or two views of the left knee were performed.     COMPARISON:  06/14/2023     FINDINGS:  The skeletal structures are intact.  Degenerative joint disease is again identified with small spurs at several positions and narrowing of the medial tibiofemoral joint space.  No significant joint effusion.     Impression:     DJD left knee        Electronically signed by:Panda Harmon MD  Date:                                            01/18/2024  Time:                                           13:00    XR KNEE ORTHO LEFT WITH FLEXION     CLINICAL HISTORY:  . Pain in left knee     TECHNIQUE:  AP standing view of both knees, PA flexion standing views of both knees, and Merchant views of both knees were performed. A lateral view of the left knee was also performed.     COMPARISON:  N 07/16/2019 one     FINDINGS:  Mild DJD.  Mild narrowing of the patellofemoral and the medial tibiofemoral joint spaces.  No fracture or dislocation.  No bone destruction identified     Impression:     See above        Electronically signed by:Rob Saleem MD  Date:                                            06/14/2023  Time:                                           11:52           Exam Ended: 06/14/23 11:25 CDT Last Resulted: 06/14/23 11:52 CDT               MRI Left Knee 7/21/2017 (in media):  The medial meniscus is intact. The lateral meniscus is intact.      A chronic complete ACL tear is noted. The posterior cruciate ligament is intact. The medial and lateral retinacula are intact. The medial collateral ligament is intact. The lateral collateral ligament in intact. The posterolateral corner structures are intact.      There is full thickness fissuring of the  central aspect medial femoral cartilage. The lateral tibiofemoral compartment cartilage is intact. There is broad thickness defect within the central trochlear cartilage. There is mild lateral patellofemoral cartilage thinning and regional full thickness loss of the central superior patellar cartilage.      A small effusion is present. There is trace infrapatellar fluid. Tiny popliteal cyst is noted. Subtle focal marrow edema is seen within the posterior tibial plateau. The bone marrow signal is heterogeneous likely reflecting marrow reconversion.      There is trace pes anserine bursitis. The tendons are otherwise normal.      Grade 2 fatty streaking of the semimembranosus and vastus lateralis muscles are noted. There is mild prepatellar edema.      MRI Right Shoulder 7/21/2017 (in media):   There is a complete tear of the supraspinatus tendon with retraction to the glenohumeral joint. There is partial tearing of the anterior infraspinatus articular surface. Mild subscapularis tendinosis is noted. The teres minor is intact. A small amount of subacromial/subdeltoid fluid is noted. The proximal long head biceps tendon is poorly visualized proximally although intact distally.      Degenerative tearing of the anterosuperior through posterosuperior labrum is noted. There is moderate to severe superior glenoid cartilage thinning with subchondral degenerative changes. The glenohumeral ligaments appear grossly intact.      An os acromiale is seen with fluid and osteophytosis at its junction with the base of the acromion. There is moderate superior subluxation of the AC joint with mild osteophytosis.      Small effusion with subcoracoid extension is noted.      There is grade 2/3 fatty atrophy of the supraspinatus muscle, grade 2 of the infraspinatus and subscapularis. Heterogeneous signal is seen throughout the bone marrow likely reflecting marrow reconversion.        Allergies: Review of patient's allergies indicates:  No  Known Allergies    Current Medications:   Current Outpatient Medications   Medication Sig Dispense Refill    acetaminophen (TYLENOL) 500 MG tablet       amLODIPine (NORVASC) 10 MG tablet Take 1 tablet (10 mg total) by mouth once daily. 90 tablet 0    atorvastatin (LIPITOR) 20 MG tablet TAKE 1 TABLET EVERY DAY 90 tablet 2    B-complex with vitamin C (Z-BEC OR EQUIV) tablet Take 1 tablet by mouth once daily.       CALCIUM CITRATE-VITAMIN D2 ORAL Take 1 tablet by mouth once daily.       citalopram (CELEXA) 10 MG tablet TAKE 1 TABLET EVERY DAY 90 tablet 3    dextran 70-hypromellose (TEARS) ophthalmic solution Place 1 drop into the right eye every 6 (six) hours. 30 mL 0    ELIQUIS 5 mg Tab TAKE 1 TABLET TWICE DAILY 180 tablet 2    ferrous gluconate (FERGON) 324 MG tablet TAKE 1 TABLET (324 MG TOTAL) BY MOUTH DAILY WITH BREAKFAST. 90 tablet 3    furosemide (LASIX) 80 MG tablet TAKE 1 TABLET TWICE DAILY 180 tablet 1    gabapentin (NEURONTIN) 300 MG capsule Take 2 capsules (600 mg total) by mouth 3 (three) times daily. 540 capsule 2    LIDOcaine-prilocaine (EMLA) cream Apply topically as needed (prior to port access). 30 g 0    lisinopriL (PRINIVIL,ZESTRIL) 40 MG tablet Take 1 tablet (40 mg total) by mouth once daily. 90 tablet 0    multivitamin with minerals tablet Take 1 tablet by mouth once daily.       oxyCODONE-acetaminophen (PERCOCET) 5-325 mg per tablet Take 1 tablet by mouth every 8 (eight) hours as needed for Pain. 90 tablet 0    pantoprazole (PROTONIX) 40 MG tablet TAKE 1 TABLET EVERY DAY 90 tablet 1    semaglutide (OZEMPIC) 1 mg/dose (4 mg/3 mL) INJECT 1 MG INTO THE SKIN EVERY 7 DAYS. 9 each 11     No current facility-administered medications for this visit.     Facility-Administered Medications Ordered in Other Visits   Medication Dose Route Frequency Provider Last Rate Last Admin    0.9%  NaCl infusion   Intravenous Continuous Tevin Clark MD 0 mL/hr at 01/13/22 1055 New Bag at 03/05/24 1000    0.9%  NaCl  infusion   Intravenous Continuous Kevin Carballo MD        sodium chloride 0.9% flush 10 mL  10 mL Intravenous PRN Kevin Carballo MD           REVIEW OF SYSTEMS:    GENERAL:  No weight loss, malaise or fevers.  HEENT:  Negative for frequent or significant headaches.  NECK:  Negative for lumps, goiter, pain and significant neck swelling.  RESPIRATORY:  Negative for cough, wheezing or shortness of breath. +LAURA  CARDIOVASCULAR:  Negative for chest pain, leg swelling or palpitations. HTN  GI:  Negative for abdominal discomfort, blood in stools or black stools or change in bowel habits. +GERD, gastric cancer actively in chemo  MUSCULOSKELETAL:  See HPI  SKIN:  Negative for lesions, rash, and itching.  PSYCH:  Positive for  mood disorder and recent psychosocial stressors. +sleep disturbance  HEMATOLOGY/LYMPHOLOGY:  Negative for prolonged bleeding, bruising easily or swollen nodes.  NEURO:   No history of headaches, syncope, paralysis, seizures or tremors.  ENDO: Thyroid disorder.   All other reviewed and negative other than HPI.    Past Medical History:  Past Medical History:   Diagnosis Date    YOUNG (acute kidney injury) 10/15/2021    Anemia     2018    Arthritis     BMI 60.0-69.9, adult     Cataract     Chronic diastolic congestive heart failure 01/27/2021    Deep vein thrombosis     Depression     Dry eye syndrome     DVT of deep femoral vein, right 05/29/2023    Gastric adenocarcinoma 05/25/2021    GERD (gastroesophageal reflux disease)     Glaucoma suspect     History of gastric ulcer     History of psychiatric hospitalization     Hx of psychiatric care     Celexa, no recall of charted Effexor, Lexapro, Desyrel prescriptions    Hyperlipidemia     Hypertension     Hypothyroidism     Morbid obesity     Neuromuscular disorder     Sleep apnea     Thyroid disease        Past Surgical History:  Past Surgical History:   Procedure Laterality Date    APPENDECTOMY      CARDIAC SURGERY      CATARACT EXTRACTION W/   INTRAOCULAR LENS IMPLANT Bilateral     MFIOL OU    CORONARY ANGIOGRAPHY Bilateral 02/24/2021    Procedure: ANGIOGRAM, CORONARY ARTERY;  Surgeon: Cresencio Ridley MD;  Location: [facility] CATH LAB;  Service: Cardiology;  Laterality: Bilateral;  Covid test needed - ok per Danay SSCU. Pt. w/o transportation or family    ENDOSCOPIC ULTRASOUND OF UPPER GASTROINTESTINAL TRACT N/A 03/17/2021    Procedure: ULTRASOUND, UPPER GI TRACT, ENDOSCOPIC;  Surgeon: Gerald Anaya MD;  Location: [facility] ENDO (2ND FLR);  Service: Endoscopy;  Laterality: N/A;  Pt unable to get a ride for swab. Will do rapid test at 8:30 - ttr    ENDOSCOPIC ULTRASOUND OF UPPER GASTROINTESTINAL TRACT N/A 01/13/2022    Procedure: ULTRASOUND, UPPER GI TRACT, ENDOSCOPIC;  Surgeon: Kevin Carballo MD;  Location: [facility] (2ND FLR);  Service: Endoscopy;  Laterality: N/A;  blood thinner approval received, see telephone encounter 1/7/22-BB  rapid  instructions given verbally and sent to address on file in pt's chart-BB    ENDOSCOPIC ULTRASOUND OF UPPER GASTROINTESTINAL TRACT N/A 10/11/2022    Procedure: ULTRASOUND, UPPER GI TRACT, ENDOSCOPIC;  Surgeon: Dequan Ridley MD;  Location: [facility] ENDO (2ND FLR);  Service: Endoscopy;  Laterality: N/A;    ENDOSCOPIC ULTRASOUND OF UPPER GASTROINTESTINAL TRACT N/A 1/24/2024    Procedure: ULTRASOUND, UPPER GI TRACT, ENDOSCOPIC;  Surgeon: Kevin Carballo MD;  Location: [facility] ENDO (2ND FLR);  Service: Endoscopy;  Laterality: N/A;    ENDOSCOPIC ULTRASOUND OF UPPER GASTROINTESTINAL TRACT N/A 3/5/2024    Procedure: ULTRASOUND, UPPER GI TRACT, ENDOSCOPIC;  Surgeon: Kevin Carballo MD;  Location: [facility] SAM (2ND FLR);  Service: Endoscopy;  Laterality: N/A;  1/25 portal instr-Repeat theEUS at the next available appointment because the preparation was poor. 48hrs of liquid. Ozempic 7 day hold-eliquis approved Dr. Latanya STINSON 12/12/2023-tt  2/28-precall complete-pt verbalized udnerstanding of holding Oz    ESOPHAGOGASTRODUODENOSCOPY  N/A 02/05/2021    Procedure: EGD (ESOPHAGOGASTRODUODENOSCOPY);  Surgeon: Matthew Hernadez MD;  Location: [facility] ENDO (2ND FLR);  Service: Endoscopy;  Laterality: N/A;  BMI-58    Wt: 361#     COVID test at W on 2/2-GT    ESOPHAGOGASTRODUODENOSCOPY N/A 03/17/2021    Procedure: EGD (ESOPHAGOGASTRODUODENOSCOPY);  Surgeon: Gerald Anaya MD;  Location: [facility] ENDO (2ND FLR);  Service: Endoscopy;  Laterality: N/A;    ESOPHAGOGASTRODUODENOSCOPY N/A 05/25/2021    Procedure: EGD (ESOPHAGOGASTRODUODENOSCOPY);  Surgeon: Kevin Carballo MD;  Location: [facility] ENDO (2ND FLR);  Service: Endoscopy;  Laterality: N/A;  ESD 2 hours  Full COVID vaccination on file. ttr    ESOPHAGOGASTRODUODENOSCOPY N/A 01/13/2022    Procedure: EGD (ESOPHAGOGASTRODUODENOSCOPY);  Surgeon: Kevin Carballo MD;  Location: [facility] ENDO (2ND FLR);  Service: Endoscopy;  Laterality: N/A;  blood thinner approval, see telephone encounter 1/7/22-BB  rapid  instructions given verbally and sent to address on file in pt's chart-BB    ESOPHAGOGASTRODUODENOSCOPY N/A 10/11/2022    Procedure: EGD (ESOPHAGOGASTRODUODENOSCOPY);  Surgeon: Dequan Ridley MD;  Location: [facility] ENDO (2ND FLR);  Service: Endoscopy;  Laterality: N/A;  She is do for EGD/EUS now. Personal history of gastric cancer. Ca Nikhilemmett #[id number].   Thanks,   Kevin Fuentes MD    Pt is fully vaccinated-DS  9/14/22-Approval to hold Davian rec'd from Dr. Pelaez (see telephone encounter 9/14/22)-DS  9/14/22-Ins    ESOPHAGOGASTRODUODENOSCOPY N/A 1/24/2024    Procedure: EGD (ESOPHAGOGASTRODUODENOSCOPY);  Surgeon: Kevin Carballo MD;  Location: [facility] ENDO (2ND FLR);  Service: Endoscopy;  Laterality: N/A;  12/8/23: instructions sent via portal. eliquis approval from MD Pelaez in telephone encounter (12/12/23) -GD  1/9-precall complete-MS    ESOPHAGOGASTRODUODENOSCOPY N/A 3/5/2024    Procedure: EGD (ESOPHAGOGASTRODUODENOSCOPY);  Surgeon: Kevin Carballo MD;  Location: [facility] ([address] [place] [address]);  Service:  Endoscopy;  Laterality: N/A;    EYE SURGERY      HYSTEROSCOPIC POLYPECTOMY OF UTERUS  1/10/2024    Procedure: POLYPECTOMY, UTERUS, HYSTEROSCOPIC;  Surgeon: Pina Pickens MD;  Location: [facility] [place] OR;  Service: OB/GYN;;    HYSTEROSCOPY WITH DILATION AND CURETTAGE OF UTERUS N/A 1/10/2024    Procedure: HYSTEROSCOPY, WITH DILATION AND CURETTAGE OF UTERUS;  Surgeon: Pina Pickens MD;  Location: [facility] [place] OR;  Service: OB/GYN;  Laterality: N/A;    INJECTION OF ANESTHETIC AGENT AROUND NERVE Left 07/10/2018    Procedure: BLOCK, NERVE;  Surgeon: Samantha Vidal MD;  Location: [facility] [place] PAIN MGT;  Service: Pain Management;  Laterality: Left;  Genicular     INJECTION OF ANESTHETIC AGENT AROUND NERVE Left 07/19/2019    Procedure: Block, Nerve NERVE BLOCK GENICULAR WITH PHENOL 6%;  Surgeon: Samantha Vidal MD;  Location: [facility] [place] [facility]IN [facility]GT;  Service: Pain Management;  Laterality: Left;  NEEDS CONSENT    INSERTION OF TUNNELED CENTRAL VENOUS CATHETER (CVC) WITH SUBCUTANEOUS PORT N/A 07/09/2021    Procedure: INSERTION, PORT-A-CATH;  Surgeon: Mario Paz MD;  Location: [facility] [place] CATH LAB;  Service: Radiology;  Laterality: N/A;  PORT PLACEMENT    RADIOFREQUENCY ABLATION Left 06/19/2020    Procedure: RADIOFREQUENCY ABLATION LEFT GENICULAR;  Surgeon: Tevin Clark MD;  Location: [facility] [place] [facility]IN MGT;  Service: Pain Management;  Laterality: Left;  Left Genicular RFA    RADIOFREQUENCY ABLATION Left 01/22/2021    Procedure: RADIOFREQUENCY ABLATION LEFT GENICULAR;  Surgeon: Tevin Clark MD;  Location: [facility] [place] PAIN MGT;  Service: Pain Management;  Laterality: Left;    RADIOFREQUENCY ABLATION Left 07/30/2021    Procedure: RADIOFREQUENCY ABLATION, GENICULAR COOLED NEED CONSENT;  Surgeon: Tevin Clark MD;  Location: [facility] [place] [facility]IN MGT;  Service: Pain Management;  Laterality: Left;    RADIOFREQUENCY ABLATION Left 04/22/2022    Procedure: RADIOFREQUENCY ABLATION, GENICULAR COOLED , LEFT;  Surgeon: Tevin Clark MD;  Location: [facility] [place] [facility]IN MGT;  Service:  Pain Management;  Laterality: Left;    RADIOFREQUENCY ABLATION Left 12/23/2022    Procedure: RADIOFREQUENCY ABLATION LEFT GENICULAR COOLED CLEARED TO HOLD ELIQUIS 3 DAYS  NEEDS CONSENT;  Surgeon: Tevin Clark MD;  Location: [facility];  Service: Pain Management;  Laterality: Left;    TONSILLECTOMY         Family History:  Family History   Problem Relation Name Age of Onset    No Known Problems Mother      No Known Problems Father      Colon cancer Neg Hx      Esophageal cancer Neg Hx         Social History:  Social History     Socioeconomic History    Marital status: Single    Number of children: 2   Occupational History     Comment: retired  and cook   Tobacco Use    Smoking status: Former     Types: Cigarettes     Start date: 2018    Smokeless tobacco: Never    Tobacco comments:     currently smoking <5 cigarettes most days   Substance and Sexual Activity    Alcohol use: Yes     Comment: rarely    Drug use: No    Sexual activity: Not Currently     Partners: Male   Social History Narrative    2 children (dtr in area, son in [place]) and she has 3 grandkids (in [place]),    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    Originally from [place]     Social Determinants of Health     Financial Resource Strain: Medium Risk (10/31/2023)    Overall Financial Resource Strain (CARDIA)     Difficulty of Paying Living Expenses: Somewhat hard   Food Insecurity: No Food Insecurity (10/31/2023)    Hunger Vital Sign     Worried About Running Out of Food in the Last Year: Never true     Ran Out of Food in the Last Year: Never true   Transportation Needs: Unmet Transportation Needs (10/31/2023)    PRAPARE - Transportation     Lack of Transportation (Medical): Yes     Lack of Transportation (Non-Medical): Yes   Physical Activity: Inactive (10/31/2023)    Exercise Vital Sign     Days of Exercise per Week: 0 days     Minutes of Exercise per Session: 0 min   Stress: Stress Concern Present (10/31/2023)    [employer]  "of Occupational Health - Occupational Stress Questionnaire     Feeling of Stress : To some extent   Social Connections: Moderately Isolated (10/31/2023)    Social Connection and Isolation Panel [NHANES]     Frequency of Communication with Friends and Family: Three times a week     Frequency of Social Gatherings with Friends and Family: Three times a week     Attends Oriental orthodox Services: 1 to 4 times per year     Active Member of Clubs or Organizations: No     Attends Club or Organization Meetings: Never     Marital Status:    Housing Stability: Low Risk  (10/31/2023)    Housing Stability Vital Sign     Unable to Pay for Housing in the Last Year: No     Number of Places Lived in the Last Year: 1     Unstable Housing in the Last Year: No       OBJECTIVE:   /74 (BP Location: Right arm, Patient Position: Sitting, BP Method: Large (Automatic))   Pulse 75   Ht 5' 6" (1.676 m)   Wt 120.7 kg (266 lb 1.5 oz)   BMI 42.95 kg/m²     PHYSICAL EXAMINATION:     General appearance: Well appearing, in no acute distress, alert and oriented x3.  Psych:  Mood and affect appropriate.  Skin: Skin color, texture, turgor normal, no rashes or lesions, in both upper and lower body.  Head/face:  Atraumatic, normocephalic.   Pulm: Symmetric chest rise, no respiratory distress noted.   Extremities:  No deformities or skin discoloration. Good capillary refill. +2 pitting edema to bilateral feet.   Musculoskeletal: There is mild pain with palpation over medial and lateral joint line of left knee. Limited ROM with mild pain. Crepitus noted to left knee. Right knee maneuvers are negative. No atrophy or tone abnormalities are noted.  Neuro:   No loss of sensation is noted.  Gait: Antalgic- ambulates with wheelchair.     ASSESSMENT: 73 y.o. year old female with left knee pain, consistent with the followin. Chronic pain of left knee  Pain Clinic Drug Screen      2. Primary osteoarthritis of left knee        3. Encounter for " monitoring opioid maintenance therapy          OPIOID MANAGEMENT:  MME: 22.5  Risk:   : Reviewed today  Naloxone:   Utox Appropriate: 8/16/22, ordered today.  Violations: None  Contract:  8/11/2020    PLAN:     - Pain contract signed 8/11/2020.    - Continue Percocet 5/325 mg TID PRN. No refills requested today     - UDS ordered for today's clinic visit     - Continue Gabapentin.     - Schedule for left knee joint Synvisc injection.    - I have stressed the importance of physical activity and a home exercise plan to help with pain and improve health.    - RTC in 1-2 weeks for Synvisc one injection     The above plan and management options were discussed at length with patient. Patient is in agreement with the above and verbalized understanding.    Kayley Green MD   PGY-2 [facility]    04/16/2024      I spent a total of 30 minutes on the day of the visit.  This includes face to face time and non-face to face time preparing to see the patient by reviewing previous labs/imaging, obtaining and/or reviewing separately obtained history, documenting clinical information in the electronic or other health record, independently interpreting results and communicating results to the patient/family/caregiver.    Tevin Clark

## 2024-04-17 ENCOUNTER — TELEPHONE (OUTPATIENT)
Dept: PAIN MEDICINE | Facility: CLINIC | Age: 74
End: 2024-04-17
Payer: MEDICARE

## 2024-04-17 DIAGNOSIS — M25.562 CHRONIC PAIN OF LEFT KNEE: Primary | ICD-10-CM

## 2024-04-17 DIAGNOSIS — G89.29 CHRONIC PAIN OF LEFT KNEE: Primary | ICD-10-CM

## 2024-04-17 NOTE — TELEPHONE ENCOUNTER
----- Message from Tevin Clark MD sent at 4/17/2024 11:02 AM CDT -----  I am ok to do it in the morning.  ----- Message -----  From: Jelly Rivas MA  Sent: 4/17/2024  10:49 AM CDT  To: Tevin Clark MD    We didn't schedule the pt because she want her procedure performed in the morning and your procedure times are for the afternoon. Are you willing to do her procedure in the morning?  ----- Message -----  From: Tevin Clark MD  Sent: 4/16/2024   2:12 PM CDT  To: Eduardo Abarca Staff    Please schedule for left knee joint Synvisc injection

## 2024-04-17 NOTE — TELEPHONE ENCOUNTER
----- Message from Tevin Clark MD sent at 4/16/2024  2:12 PM CDT -----  Please schedule for left knee joint Synvisc injection

## 2024-04-17 NOTE — TELEPHONE ENCOUNTER
Staff tried calling pt to get her scheduled for an in office left knee Synvisc injection. Pt did not answer staff monique  message for pt to give us a call back to get her scheduled.

## 2024-04-21 LAB
6MAM UR QL: NOT DETECTED
7AMINOCLONAZEPAM UR QL: NOT DETECTED
A-OH ALPRAZ UR QL: NOT DETECTED
ALPHA-OH-MIDAZOLAM: NOT DETECTED
ALPRAZ UR QL: NOT DETECTED
AMPHET UR QL SCN: NOT DETECTED
ANNOTATION COMMENT IMP: NORMAL
BARBITURATES UR QL: NEGATIVE
BUPRENORPHINE UR QL: NOT DETECTED
BZE UR QL: NEGATIVE
CARBOXYTHC UR QL: NEGATIVE
CARISOPRODOL UR QL: NEGATIVE
CLONAZEPAM UR QL: NOT DETECTED
CODEINE UR QL: NOT DETECTED
CREAT UR-MCNC: 102.8 MG/DL (ref 20–400)
DIAZEPAM UR QL: NOT DETECTED
ETHYL GLUCURONIDE UR QL: NEGATIVE
FENTANYL UR QL: NOT DETECTED
GABAPENTIN: PRESENT
HYDROCODONE UR QL: NOT DETECTED
HYDROMORPHONE UR QL: NOT DETECTED
LORAZEPAM UR QL: NOT DETECTED
MDA UR QL: NOT DETECTED
MDEA UR QL: NOT DETECTED
MDMA UR QL: NOT DETECTED
ME-PHENIDATE UR QL: NOT DETECTED
METHADONE UR QL: NEGATIVE
METHAMPHET UR QL: NOT DETECTED
MIDAZOLAM UR QL SCN: NOT DETECTED
MORPHINE UR QL: NOT DETECTED
NALOXONE: NOT DETECTED
NORBUPRENORPHINE UR QL CFM: NOT DETECTED
NORDIAZEPAM UR QL: NOT DETECTED
NORFENTANYL UR QL: NOT DETECTED
NORHYDROCODONE UR QL CFM: NOT DETECTED
NORMEPERIDINE UR QL CFM: NOT DETECTED
NOROXYCODONE UR QL CFM: PRESENT
NOROXYMORPHONE UR QL SCN: PRESENT
OXAZEPAM UR QL: NOT DETECTED
OXYCODONE UR QL: PRESENT
OXYMORPHONE UR QL: PRESENT
PATHOLOGY STUDY: NORMAL
PCP UR QL: NEGATIVE
PHENTERMINE UR QL: NOT DETECTED
PREGABALIN: NOT DETECTED
SERVICE CMNT-IMP: NORMAL
TAPENTADOL UR QL SCN: NOT DETECTED
TAPENTADOL UR QL SCN: NOT DETECTED
TEMAZEPAM UR QL: NOT DETECTED
TRAMADOL UR QL: NEGATIVE
ZOLPIDEM METABOLITE: NOT DETECTED
ZOLPIDEM UR QL: NOT DETECTED

## 2024-04-22 ENCOUNTER — OFFICE VISIT (OUTPATIENT)
Dept: HEMATOLOGY/ONCOLOGY | Facility: CLINIC | Age: 74
End: 2024-04-22
Payer: MEDICARE

## 2024-04-22 VITALS
SYSTOLIC BLOOD PRESSURE: 104 MMHG | HEART RATE: 67 BPM | HEIGHT: 66 IN | DIASTOLIC BLOOD PRESSURE: 52 MMHG | WEIGHT: 266.56 LBS | TEMPERATURE: 98 F | BODY MASS INDEX: 42.84 KG/M2 | OXYGEN SATURATION: 95 %

## 2024-04-22 DIAGNOSIS — I10 ESSENTIAL HYPERTENSION: ICD-10-CM

## 2024-04-22 DIAGNOSIS — C16.9 GASTRIC ADENOCARCINOMA: Primary | ICD-10-CM

## 2024-04-22 PROCEDURE — 4010F ACE/ARB THERAPY RXD/TAKEN: CPT | Mod: HCNC,CPTII,S$GLB, | Performed by: STUDENT IN AN ORGANIZED HEALTH CARE EDUCATION/TRAINING PROGRAM

## 2024-04-22 PROCEDURE — 1160F RVW MEDS BY RX/DR IN RCRD: CPT | Mod: HCNC,CPTII,S$GLB, | Performed by: STUDENT IN AN ORGANIZED HEALTH CARE EDUCATION/TRAINING PROGRAM

## 2024-04-22 PROCEDURE — 99214 OFFICE O/P EST MOD 30 MIN: CPT | Mod: HCNC,S$GLB,, | Performed by: STUDENT IN AN ORGANIZED HEALTH CARE EDUCATION/TRAINING PROGRAM

## 2024-04-22 PROCEDURE — 3288F FALL RISK ASSESSMENT DOCD: CPT | Mod: HCNC,CPTII,S$GLB, | Performed by: STUDENT IN AN ORGANIZED HEALTH CARE EDUCATION/TRAINING PROGRAM

## 2024-04-22 PROCEDURE — 3008F BODY MASS INDEX DOCD: CPT | Mod: HCNC,CPTII,S$GLB, | Performed by: STUDENT IN AN ORGANIZED HEALTH CARE EDUCATION/TRAINING PROGRAM

## 2024-04-22 PROCEDURE — 99999 PR PBB SHADOW E&M-EST. PATIENT-LVL IV: CPT | Mod: PBBFAC,HCNC,, | Performed by: STUDENT IN AN ORGANIZED HEALTH CARE EDUCATION/TRAINING PROGRAM

## 2024-04-22 PROCEDURE — 3044F HG A1C LEVEL LT 7.0%: CPT | Mod: HCNC,CPTII,S$GLB, | Performed by: STUDENT IN AN ORGANIZED HEALTH CARE EDUCATION/TRAINING PROGRAM

## 2024-04-22 PROCEDURE — 1125F AMNT PAIN NOTED PAIN PRSNT: CPT | Mod: HCNC,CPTII,S$GLB, | Performed by: STUDENT IN AN ORGANIZED HEALTH CARE EDUCATION/TRAINING PROGRAM

## 2024-04-22 PROCEDURE — 1101F PT FALLS ASSESS-DOCD LE1/YR: CPT | Mod: HCNC,CPTII,S$GLB, | Performed by: STUDENT IN AN ORGANIZED HEALTH CARE EDUCATION/TRAINING PROGRAM

## 2024-04-22 PROCEDURE — 1159F MED LIST DOCD IN RCRD: CPT | Mod: HCNC,CPTII,S$GLB, | Performed by: STUDENT IN AN ORGANIZED HEALTH CARE EDUCATION/TRAINING PROGRAM

## 2024-04-22 PROCEDURE — 3074F SYST BP LT 130 MM HG: CPT | Mod: HCNC,CPTII,S$GLB, | Performed by: STUDENT IN AN ORGANIZED HEALTH CARE EDUCATION/TRAINING PROGRAM

## 2024-04-22 PROCEDURE — 3078F DIAST BP <80 MM HG: CPT | Mod: HCNC,CPTII,S$GLB, | Performed by: STUDENT IN AN ORGANIZED HEALTH CARE EDUCATION/TRAINING PROGRAM

## 2024-04-22 RX ORDER — SODIUM CHLORIDE 0.9 % (FLUSH) 0.9 %
10 SYRINGE (ML) INJECTION
Status: CANCELLED | OUTPATIENT
Start: 2024-04-22

## 2024-04-22 RX ORDER — HEPARIN 100 UNIT/ML
500 SYRINGE INTRAVENOUS
Status: CANCELLED | OUTPATIENT
Start: 2024-04-22

## 2024-04-22 NOTE — PROGRESS NOTES
[facility] - NEW PATIENT VISIT    Reason for visit: Transfer Care    Best Contact Phone Number(s): [phone] (home)      Cancer/Stage/TNM:    Cancer Staging   Gastric adenocarcinoma  Staging form: Stomach, AJCC 8th Edition  - Pathologic stage from 5/25/2021: Stage Unknown (pT1, pNX, cM0) - Signed by Sonu Darden MD on 2/2/2022       Oncology History   Gastric adenocarcinoma   2/5/2021 Procedure    EGD  Feb 2021 EGD- Gastric tumor in the prepyloric region of the stomach,     3/17/2021 Procedure    EUS  Impression:           - Gastric tumor in the prepyloric region of the                         stomach. Biopsied. Lesion appears amenable to                         endoscopic resection (ESD) - Dr. Carballo was present                         to view the lesion.      5/25/2021 Initial Diagnosis    Gastric adenocarcinoma     5/25/2021 Pathology Significant Finding    Adenocarcinoma, moderate to poorly differentiated   Tumor invades deep layers of submuocsa with deep margin extensively positive   Deep margin is extensively positive   Lateral margins are negative for neoplasia or malignancy      5/25/2021 Cancer Staged    Staging form: Stomach, AJCC 8th Edition  - Pathologic stage from 5/25/2021: Stage Unknown (pT1, pNX, cM0)     6/18/2021 Imaging Significant Findings    CT CAP   Asymmetric gastric wall thickening at the level of the gastric antrum presumably representing the patient's gastric adenocarcinoma.  I see no CT evidence for metastatic disease.     Low-density focus lower pole left kidney does not meet criteria for a simple cyst.  Findings can be further evaluated with ultrasound.  Additional subcentimeter low-density foci in the right kidney likely too small to characterize by imaging.     6/28/2021 Tumor Conference       [facility]   UGI MULTIDISCIPLINARY TUMOR BOARD  PATIENT REVIEW FORM   ____________________________________________________________    CLINIC #:  [id number]  DATE: 6/28/2021    DIAGNOSIS: gastric CA    PRESENTER: Latanya/ Donnie Sanders    PATIENT SUMMARY:   This 69 y/o female had incidental finding of gastric CA on EGD as part of bariatric evaluation given BMI 57. Underwent ESD per Dr. Carballo. Reviewed pathology - pT1b with LVI positive, extensive tumor at deep margin.     BOARD RECOMMENDATIONS:   Recommend perioperative chemotherapy, 4 cycles of FLOT    CONSULT NEEDED:     [] Surgery    [x] Hem/Onc    [] Rad/Onc    [] Dietary                 [] Social Service    [] Psychology       [] AES  [] Radiology     ESD Pathology Stage: Tumor 1b  Node(s) 0 Metastasis 0      GROUP STAGE:  [] O    [] 1A    [] IB    [] IIA    [] IIB     [] IIIA     [] IIIB     [] IIIC    []IV  [] Local recurrence     [] Regional recurrence     [] Distant recurrence   Metastatic site(s): none         [x] Jennifer'l Treatment Guidelines reviewed and care planned is consistent with guidelines.         (i.e., NCCN, NCI, PD, ACO, AUA, etc.)    PRESENTATION AT CANCER CONFERENCE:         [x] Prospective    [] Retrospective     [] Follow-Up       7/21/2021 - 9/30/2021 Chemotherapy    Treatment Summary   Plan Name: OP GASTRIC FLOT - FLUOROURACIL LEUCOVORIN OXALIPLATIN DOCETAXEL Q2W  Treatment Goal: Curative  Status: Inactive  Start Date: 7/21/2021  End Date: 9/30/2021  Provider: Cathy Pelaez MD  Chemotherapy: DOCEtaxeL (TAXOTERE) 50 mg/m2 = 135 mg in sodium chloride 0.9% 250 mL chemo infusion, 50 mg/m2 = 135 mg, Intravenous, Clinic/HOD 1 time, 4 of 4 cycles  Dose modification: 40 mg/m2 (original dose 50 mg/m2, Cycle 3)  Administration: 135 mg (7/21/2021), 135 mg (8/12/2021), 105 mg (9/15/2021), 105 mg (9/29/2021)  leucovorin calcium 200 mg/m2 = 545 mg in dextrose 5 % 250 mL infusion, 200 mg/m2 = 545 mg, Intravenous, Clinic/HOD 1 time, 4 of 4 cycles  Administration: 545 mg (7/21/2021), 535 mg (8/12/2021), 530 mg (9/15/2021), 525 mg (9/29/2021)  oxaliplatin (ELOXATIN) 85 mg/m2 = 232 mg in dextrose 5 % 500  mL chemo infusion, 85 mg/m2 = 232 mg, Intravenous, [facility] 1 time, 4 of 4 cycles  Administration: 232 mg (7/21/2021), 228 mg (8/12/2021), 225 mg (9/15/2021), 223 mg (9/29/2021)  fluorouraciL 7,000 mg in sodium chloride 0.9% 240 mL chemo infusion, 7,100 mg, Intravenous, Over 24 hours, 4 of 11 cycles  Dose modification: 2,000 mg/m2 (original dose 2,600 mg/m2, Cycle 3), 225 mg/m2/day (original dose 2,600 mg/m2, Cycle 5)  Administration: 7,000 mg (7/21/2021), 6,970 mg (8/12/2021), 5,300 mg (9/15/2021), 5,000 mg (9/29/2021)     9/16/2021 Imaging Significant Findings    Thrombosis of the right popliteal, posterior tibial, and peroneal veins.     11/15/2021 Imaging Significant Findings    CT CAP Overall, no detrimental change compared to previous.  Persistent eccentric wall thickening at the level of the antrum in this patient with provided history of gastric adenocarcinoma.  No metastatic disease.     Subcentimeter pulmonary nodules unchanged.  No new nodules.     Cholelithiasis     1/13/2022 Procedure    EUS  Impression:            - Normal esophagus.                          - Scar in the gastric antrum. Biopsied.                          - Congested, nodular and ulcerated mucosa in the                          antrum. Biopsied. Treated with argon plasma                          coagulation (APC).                          - Acquired deformity in the prepyloric region of                          the stomach.                          - Normal examined duodenum.                          - There was abnormal echogenicity in the                          visualized portion of the liver. This was                          hyperechoic and homogenous. This can be seen with                          fatty liver.                          - Hyperechoic material consistent with sludge was                          visualized endosonographically in the gallbladder.                          - There was dilation in the common bile duct  which                          measured up to 10.8 mm.                          - Wall thickening was seen in the antrum of the                          stomach. This probable related to previous ESD.      2/14/2022 - 3/30/2022 Chemotherapy    Treatment Summary   Plan Name: OP FLUOROURACIL (5 DAY) QW + XRT  Treatment Goal: Curative  Status: Inactive  Start Date: 2/14/2022  End Date: 3/30/2022  Provider: Cathy Pelaez MD  Chemotherapy: [No matching medication found in this treatment plan]     2/14/2022 - 3/29/2022 Radiation Therapy    Treating physician: Dr. oSnu Darden  Total Dose: 50.4 Gy  Fractions: 28    Course: C1 Abdomen 2022    Treatment Site Ref. ID Energy Dose/Fx (Gy) #Fx Dose Correction (Gy) Total Dose (Gy) Start Date End Date Elapsed Days   IM Stomach PTV_Low 6X 1.8 25 / 25 0 45 2/14/2022 3/24/2022 38   IM StomachBst PTVhigh 6X 1.8 3 / 3 0 5.4 3/25/2022 3/29/2022 4     F Isabel Coats is a 71 y.o. woman with at least [id number] adenocarcinoma of gastric antrum (posterior wall), intestinal type, moderate to poorly differentiated, invading deep layers of submuocsa with deep SM extensively positive, lateral margins negative, LVI focal positive, PNI present, no nodes examined, s/p 4 cycles of FLOT per [facility], but after re-assessment, no longer surgical candidate due to comorbidities.  PMH of  LAURA, HTN, HLD, Obesity, smoker, Mili CHF    She is s/p concurrent 5FU-CI and RT 45Gy/25fx to the entire stomach+elective nodes with 5.4Gy/3fx boost to the prepylotic region completed 3/29/2022.              7/19/2022 Imaging Significant Findings    PET scan     In this patient with gastric cancer, there is no definite evidence of hypermetabolic tumor.     Nonspecific mildly hypermetabolic and diffuse cutaneous thickening of the lower anterior abdominal wall.  Recommend correlation with history and physical examination.     10/11/2022 Procedure    EUS   - Scar in the gastric antrum from prior ESD at                           that site with congested/erythematous/nodular                          adjacent mucosa (possibly granulation tissue).                          Biopsied extensively with cold forceps.                          - A medium amount of food (residue) in the stomach.                          - Wall thickening was seen in the antrum of the                          stomach and in the prepyloric region of the                          stomach approximately 9-11mm endosonographically;                          likely related to prior ESD.                          - There was no sign of significant pathology in                          the pancreatic head, pancreatic body, pancreatic                          tail, uncinate process of the pancreas and                          pancreatic neck.                          - There was no sign of significant pathology in                          the common bile duct.                          - Hyperechoic material consistent with sludge was                          again visualized endosonographically in the                          gallbladder.                          - There was mild echogenicity diffusely in the                          liver suggestive of fatty liver in the visualized                          portions of the liver.                          - Region of celiac artery takeoff was unremarkable.                          - No obvious lymphadenopathy.      10/11/2022 Pathology Significant Finding    STOMACH, BIOPSY:   Gastric body and antral mucosa with mild chronic gastritis and reactive   changes   No evidence of intestinal metaplasia, dysplasia or malignancy   No evidence of Helicobacter pylori organisms on H&E stain      11/16/2023 Imaging Significant Findings    Stable concentric thickening of the stomach no change from the prior study.  This is consistent with a history gastric carcinoma.  There is no evidence of metastatic disease.           HPI: Ca  Isabel Coats is a 73 y.o. female with history of gastric cancer who presents to transfer care. Patient overall feels well. Patient denies abdominal pains, no new concerns. Patient tolerating diet, adequate appetite. No bleeding noted.     Patient presents alone.  ECOG PS is 1.  History has been obtained by chart review and discussion with the patient.    ROS:   A complete 12-point review of systems was reviewed and is negative except as mentioned above.     Past Medical History:   Past Medical History:   Diagnosis Date    YOUNG (acute kidney injury) 10/15/2021    Anemia     2018    Arthritis     BMI 60.0-69.9, adult     Cataract     Chronic diastolic congestive heart failure 01/27/2021    Deep vein thrombosis     Depression     Dry eye syndrome     DVT of deep femoral vein, right 05/29/2023    Gastric adenocarcinoma 05/25/2021    GERD (gastroesophageal reflux disease)     Glaucoma suspect     History of gastric ulcer     History of psychiatric hospitalization     Hx of psychiatric care     Celexa, no recall of charted Effexor, Lexapro, Desyrel prescriptions    Hyperlipidemia     Hypertension     Hypothyroidism     Morbid obesity     Neuromuscular disorder     Sleep apnea     Thyroid disease         Past Surgical History:   Past Surgical History:   Procedure Laterality Date    APPENDECTOMY      CARDIAC SURGERY      CATARACT EXTRACTION W/  INTRAOCULAR LENS IMPLANT Bilateral     MFIOL OU    CORONARY ANGIOGRAPHY Bilateral 02/24/2021    Procedure: ANGIOGRAM, CORONARY ARTERY;  Surgeon: Cresencio Ridley MD;  Location: [facility] CATH LAB;  Service: Cardiology;  Laterality: Bilateral;  Covid test needed - ok per Danay SSCU. Pt. w/o transportation or family    ENDOSCOPIC ULTRASOUND OF UPPER GASTROINTESTINAL TRACT N/A 03/17/2021    Procedure: ULTRASOUND, UPPER GI TRACT, ENDOSCOPIC;  Surgeon: Gerald Anaya MD;  Location: [facility] ENDO ([address] [place] [address]);  Service: Endoscopy;  Laterality: N/A;  Pt unable to get a ride for swab. Will do  rapid test at 8:30 - ttr    ENDOSCOPIC ULTRASOUND OF UPPER GASTROINTESTINAL TRACT N/A 01/13/2022    Procedure: ULTRASOUND, UPPER GI TRACT, ENDOSCOPIC;  Surgeon: Kevin Carballo MD;  Location: [facility] ENDO (2ND FLR);  Service: Endoscopy;  Laterality: N/A;  blood thinner approval received, see telephone encounter 1/7/22-BB  rapid  instructions given verbally and sent to address on file in pt's chart-BB    ENDOSCOPIC ULTRASOUND OF UPPER GASTROINTESTINAL TRACT N/A 10/11/2022    Procedure: ULTRASOUND, UPPER GI TRACT, ENDOSCOPIC;  Surgeon: Dequan Ridley MD;  Location: [facility] ENDO (2ND FLR);  Service: Endoscopy;  Laterality: N/A;    ENDOSCOPIC ULTRASOUND OF UPPER GASTROINTESTINAL TRACT N/A 1/24/2024    Procedure: ULTRASOUND, UPPER GI TRACT, ENDOSCOPIC;  Surgeon: Kevin Carballo MD;  Location: [facility] ENDO (2ND FLR);  Service: Endoscopy;  Laterality: N/A;    ENDOSCOPIC ULTRASOUND OF UPPER GASTROINTESTINAL TRACT N/A 3/5/2024    Procedure: ULTRASOUND, UPPER GI TRACT, ENDOSCOPIC;  Surgeon: Kevin Carballo MD;  Location: [facility] ENDO (2ND FLR);  Service: Endoscopy;  Laterality: N/A;  1/25 portal instr-Repeat theEUS at the next available appointment because the preparation was poor. 48hrs of liquid. Ozempic 7 day hold-eliquis approved Dr. Pelaez TE 12/12/2023-tt  2/28-precall complete-pt verbalized udnerstanding of holding Oz    ESOPHAGOGASTRODUODENOSCOPY N/A 02/05/2021    Procedure: EGD (ESOPHAGOGASTRODUODENOSCOPY);  Surgeon: Matthew Hernadez MD;  Location: [facility] ENDO (2ND FLR);  Service: Endoscopy;  Laterality: N/A;  BMI-58    Wt: 361#     COVID test at [facility] on 2/2-GT    ESOPHAGOGASTRODUODENOSCOPY N/A 03/17/2021    Procedure: EGD (ESOPHAGOGASTRODUODENOSCOPY);  Surgeon: Gerald Anaya MD;  Location: [facility] ENDO (2ND FLR);  Service: Endoscopy;  Laterality: N/A;    ESOPHAGOGASTRODUODENOSCOPY N/A 05/25/2021    Procedure: EGD (ESOPHAGOGASTRODUODENOSCOPY);  Surgeon: Kevin Carballo MD;  Location: [facility] ([address] [place] [address]);  Service:  Endoscopy;  Laterality: N/A;  ESD 2 hours  Full COVID vaccination on file. ttr    ESOPHAGOGASTRODUODENOSCOPY N/A 01/13/2022    Procedure: EGD (ESOPHAGOGASTRODUODENOSCOPY);  Surgeon: Kevin Carballo MD;  Location: [facility] (2ND FLR);  Service: Endoscopy;  Laterality: N/A;  blood thinner approval, see telephone encounter 1/7/22-BB  rapid  instructions given verbally and sent to address on file in pt's chart-BB    ESOPHAGOGASTRODUODENOSCOPY N/A 10/11/2022    Procedure: EGD (ESOPHAGOGASTRODUODENOSCOPY);  Surgeon: Dequan Ridley MD;  Location: [facility] SAM (2ND FLR);  Service: Endoscopy;  Laterality: N/A;  She is do for EGD/EUS now. Personal history of gastric cancer. Ca Coats #[id number].   Thanks,   Kevin Carballo. MD    Pt is fully vaccinated-DS  9/14/22-Approval to hold Eliquis rec'd from Dr. Pelaez (see telephone encounter 9/14/22)-DS  9/14/22-Ins    ESOPHAGOGASTRODUODENOSCOPY N/A 1/24/2024    Procedure: EGD (ESOPHAGOGASTRODUODENOSCOPY);  Surgeon: Kevin Carballo MD;  Location: [facility] (2ND FLR);  Service: Endoscopy;  Laterality: N/A;  12/8/23: instructions sent via portal. eliquis approval from MD Pelaez in telephone encounter (12/12/23) -GD  1/9-precall complete-MS    ESOPHAGOGASTRODUODENOSCOPY N/A 3/5/2024    Procedure: EGD (ESOPHAGOGASTRODUODENOSCOPY);  Surgeon: Kevin Carballo MD;  Location: [facility] SAM (2ND FLR);  Service: Endoscopy;  Laterality: N/A;    EYE SURGERY      HYSTEROSCOPIC POLYPECTOMY OF UTERUS  1/10/2024    Procedure: POLYPECTOMY, UTERUS, HYSTEROSCOPIC;  Surgeon: Pina Pickens MD;  Location: [facility];  Service: OB/GYN;;    HYSTEROSCOPY WITH DILATION AND CURETTAGE OF UTERUS N/A 1/10/2024    Procedure: HYSTEROSCOPY, WITH DILATION AND CURETTAGE OF UTERUS;  Surgeon: Pina Pickens MD;  Location: [facility] OR;  Service: OB/GYN;  Laterality: N/A;    INJECTION OF ANESTHETIC AGENT AROUND NERVE Left 07/10/2018    Procedure: BLOCK, NERVE;  Surgeon: Samantha Vidal MD;  Location: [facility] PAIN  MGT;  Service: Pain Management;  Laterality: Left;  Genicular     INJECTION OF ANESTHETIC AGENT AROUND NERVE Left 07/19/2019    Procedure: Block, Nerve NERVE BLOCK GENICULAR WITH PHENOL 6%;  Surgeon: Samantha Vidal MD;  Location: [facility] PAIN MGT;  Service: Pain Management;  Laterality: Left;  NEEDS CONSENT    INSERTION OF TUNNELED CENTRAL VENOUS CATHETER (CVC) WITH SUBCUTANEOUS PORT N/A 07/09/2021    Procedure: INSERTION, PORT-A-CATH;  Surgeon: Mario Paz MD;  Location: [facility] CATH LAB;  Service: Radiology;  Laterality: N/A;  PORT PLACEMENT    RADIOFREQUENCY ABLATION Left 06/19/2020    Procedure: RADIOFREQUENCY ABLATION LEFT GENICULAR;  Surgeon: Tevin Clark MD;  Location: [facility] PAIN MGT;  Service: Pain Management;  Laterality: Left;  Left Genicular RFA    RADIOFREQUENCY ABLATION Left 01/22/2021    Procedure: RADIOFREQUENCY ABLATION LEFT GENICULAR;  Surgeon: Tevin Clark MD;  Location: [facility] PAIN MGT;  Service: Pain Management;  Laterality: Left;    RADIOFREQUENCY ABLATION Left 07/30/2021    Procedure: RADIOFREQUENCY ABLATION, GENICULAR COOLED NEED CONSENT;  Surgeon: Tevin Clark MD;  Location: [facility] PAIN MGT;  Service: Pain Management;  Laterality: Left;    RADIOFREQUENCY ABLATION Left 04/22/2022    Procedure: RADIOFREQUENCY ABLATION, GENICULAR COOLED , LEFT;  Surgeon: Tevin Clark MD;  Location: [facility] PAIN MGT;  Service: Pain Management;  Laterality: Left;    RADIOFREQUENCY ABLATION Left 12/23/2022    Procedure: RADIOFREQUENCY ABLATION LEFT GENICULAR COOLED CLEARED TO HOLD ELIQUIS 3 DAYS  NEEDS CONSENT;  Surgeon: Tevin Clark MD;  Location: [facility] PAIN MGT;  Service: Pain Management;  Laterality: Left;    TONSILLECTOMY          Family History:   Family History   Problem Relation Name Age of Onset    No Known Problems Mother      No Known Problems Father      Colon cancer Neg Hx      Esophageal cancer Neg Hx          Social History:   Social History     Tobacco Use    Smoking status: Former      Types: Cigarettes     Start date: 2018    Smokeless tobacco: Never    Tobacco comments:     currently smoking <5 cigarettes most days   Substance Use Topics    Alcohol use: Yes     Comment: rarely        I have reviewed and updated the patient's past medical, surgical, family and social histories.    Allergies:   Review of patient's allergies indicates:  No Known Allergies     Medications:   Current Outpatient Medications   Medication Sig Dispense Refill    acetaminophen (TYLENOL) 500 MG tablet       amLODIPine (NORVASC) 10 MG tablet Take 1 tablet (10 mg total) by mouth once daily. 90 tablet 0    atorvastatin (LIPITOR) 20 MG tablet TAKE 1 TABLET EVERY DAY 90 tablet 2    B-complex with vitamin C (Z-BEC OR EQUIV) tablet Take 1 tablet by mouth once daily.       CALCIUM CITRATE-VITAMIN D2 ORAL Take 1 tablet by mouth once daily.       citalopram (CELEXA) 10 MG tablet TAKE 1 TABLET EVERY DAY 90 tablet 0    dextran 70-hypromellose (TEARS) ophthalmic solution Place 1 drop into the right eye every 6 (six) hours. 30 mL 0    ELIQUIS 5 mg Tab TAKE 1 TABLET TWICE DAILY 180 tablet 2    ferrous gluconate (FERGON) 324 MG tablet TAKE 1 TABLET (324 MG TOTAL) BY MOUTH DAILY WITH BREAKFAST. 90 tablet 3    furosemide (LASIX) 80 MG tablet TAKE 1 TABLET TWICE DAILY 180 tablet 1    gabapentin (NEURONTIN) 300 MG capsule Take 2 capsules (600 mg total) by mouth 3 (three) times daily. 540 capsule 2    LIDOcaine-prilocaine (EMLA) cream Apply topically as needed (prior to port access). 30 g 0    lisinopriL (PRINIVIL,ZESTRIL) 40 MG tablet Take 1 tablet (40 mg total) by mouth once daily. 90 tablet 0    multivitamin with minerals tablet Take 1 tablet by mouth once daily.       oxyCODONE-acetaminophen (PERCOCET) 5-325 mg per tablet Take 1 tablet by mouth every 8 (eight) hours as needed for Pain. 90 tablet 0    pantoprazole (PROTONIX) 40 MG tablet TAKE 1 TABLET EVERY DAY 90 tablet 1    semaglutide (OZEMPIC) 1 mg/dose (4 mg/3 mL) INJECT 1 MG INTO THE  "SKIN EVERY 7 DAYS. 9 each 11     No current facility-administered medications for this visit.     Facility-Administered Medications Ordered in Other Visits   Medication Dose Route Frequency Provider Last Rate Last Admin    0.9%  NaCl infusion   Intravenous Continuous Tevin Clark MD 0 mL/hr at 01/13/22 1055 New Bag at 03/05/24 1000    0.9%  NaCl infusion   Intravenous Continuous Kevin Carballo MD        sodium chloride 0.9% flush 10 mL  10 mL Intravenous PRN Kevin Carballo MD            Physical Exam:   BP (!) 104/52 (BP Location: Right forearm, Patient Position: Sitting, BP Method: Medium (Automatic))   Pulse 67   Temp 98.2 °F (36.8 °C) (Oral)   Ht 5' 6" (1.676 m)   Wt 120.9 kg (266 lb 8.6 oz)   SpO2 95%   BMI 43.02 kg/m²                Physical Exam  Constitutional:       Appearance: Normal appearance.   HENT:      Head: Normocephalic and atraumatic.      Mouth/Throat:      Mouth: Mucous membranes are moist.   Eyes:      Extraocular Movements: Extraocular movements intact.      Pupils: Pupils are equal, round, and reactive to light.   Cardiovascular:      Rate and Rhythm: Normal rate and regular rhythm.      Pulses: Normal pulses.      Heart sounds: No murmur heard.  Pulmonary:      Effort: Pulmonary effort is normal. No respiratory distress.      Breath sounds: Normal breath sounds.   Abdominal:      General: Abdomen is flat. There is no distension.      Palpations: Abdomen is soft.      Tenderness: There is no abdominal tenderness.   Musculoskeletal:         General: No swelling or tenderness. Normal range of motion.      Cervical back: Normal range of motion. No rigidity.   Skin:     General: Skin is warm and dry.      Coloration: Skin is not jaundiced.   Neurological:      General: No focal deficit present.      Mental Status: She is alert and oriented to person, place, and time.   Psychiatric:         Mood and Affect: Mood normal.         Behavior: Behavior normal.           Labs:   Lab " Results   Component Value Date    WBC 7.22 01/04/2024    HGB 11.5 (L) 01/04/2024    HCT 36.8 (L) 01/04/2024     (H) 01/04/2024     01/04/2024       Lab Results   Component Value Date     01/18/2024    K 4.4 01/18/2024     01/18/2024    CO2 28 01/18/2024    BUN 24 (H) 01/18/2024    CREATININE 1.0 01/18/2024    ALBUMIN 3.1 (L) 01/18/2024    BILITOT 0.6 01/18/2024    ALKPHOS 93 01/18/2024    AST 13 01/18/2024    ALT 10 01/18/2024       Imaging:   CTA Head and Neck (xpd)  Narrative: EXAMINATION:  CTA HEAD AND NECK (XPD)    CLINICAL HISTORY:  Carotid artery aneurysm;Cerebral aneurysm, nonruptured    TECHNIQUE:  5 mm axial images of the head pre and post contrast with 0.625 mm axial CTA images of the head neck post-contrast.  Coronal and sagittal MPR and MIP imaging was performed 100 ml of Omnipaque 350 contrast was injected intravenously    COMPARISON:  MRA head earlier same day and MRI 01/26/2024    FINDINGS:  CT head with and  without contrast: There is no evidence for acute intracranial hemorrhage or sulcal effacement.  The ventricles are normal in size and configuration without evidence for hydrocephalus.  There is no midline shift or mass effect.  Allowing for CT technique there is no abnormal parenchymal enhancement.  The visualized paranasal sinuses and mastoid air cells are clear.  Slight prominent bony protrusion along the left inferior frontal calvarium suggestive for an osteoma this measures 0.7 cm image 16 series 8.  No significant mass effect on the adjacent underlying brain parenchyma    CTA head:    Anterior circulation: The bilateral distal cervical, petrous, cavernous, and supraclinoid segments of the ICAs are patent without significant focal stenosis.  There is a posterior projected outpouching along the left communicating segment of the ICA measuring approximately 0.5 cm image 363 series 5 compatible with aneurysm    Separately there is more globular aneurysm along the left  proximal M2 segment of the MCA which measures approximately 1.1 cm in greatest transverse dimension image 363 series 5 compatible with additional aneurysm    There is no evidence for right middle cerebral artery or anterior cerebral artery additional aneurysm    .    Posterior circulation: Distal left vertebral artery slightly dominant.  Distal vertebral arteries, basilar artery and posterior cerebral arteries are patent without significant focal stenosis.  There is a globular outpouching the tip of the basilar artery measuring 0.6 cm image 376 series 5 compatible with additional aneurysm as seen on MRA.    CTA neck: Atherosclerotic plaquing of the arch and origin great vessels without significant focal stenosis..    The origin of the vertebral arteries from the respective subclavian arteries are within normal limits.  Left vertebral artery is slightly dominant.  Vertebral arteries are patent throughout their course without significant focal stenosis    There is a right-sided Port-A-Cath identified    Right carotid: The right common carotid artery, carotid bifurcation and extracranial portions of the internal carotid artery are patent without significant focal stenosis.    Left carotid: The left common carotid artery, carotid bifurcation and extracranial portions of the internal carotid arteries are patent without significant focal stenosis.    Atherosclerotic plaquing of the carotid bifurcations and proximal ICAs with less than 50% proximal ICA stenosis by NASCET criteria..    Pharynx/larynx: Nasopharynx within normal limits.  There is medialized carotid arteries in the retropharyngeal soft tissues more pronounced on the right with impression posterior pharyngeal wall    No evidence for definite oral cavity oral buccal space lesion    Glands: No focal parotid, submandibular gland lesion.  There is a partially calcified lesion right lobe of the thyroid measuring 0.8 cm without hypodense larger lesion left lobe of  the thyroid measuring 1.5 cm clinical correlation and follow-up nonemergent thyroid ultrasound advised    Subpleural emphysematous change in the visualized lungs with ill-defined opacities along the periphery lung suggestive for scarring or atelectasis though indeterminate.  There is no large lung consolidation.  No evidence for acute fracture or subluxation cervical spine    Please see MRA report for further details.    This report was flagged in Epic as abnormal.  Impression: CTA head: Redemonstration of 3 intracranial aneurysms involving left posterior communicating segment ICA, left M2 MCA, and the basilar tip overall unchanged allowing for differences in technique..    CTA neck: No significant arterial stenosis throughout the neck..    CT head: No evidence for acute intracranial hemorrhage or definite abnormal parenchymal enhancement.    Please see above for additional details.    Electronically signed by: Chris Monson DO  Date:    03/17/2024  Time:    10:49  MRA Brain with and without contrast  Narrative: EXAMINATION:  MRA BRAIN WITH AND WITHOUT    CLINICAL HISTORY:  Cerebral aneurysm, follow-up;Cerebral aneurysm, nonruptured    COMPARISON:  MRA head 01/26/2024    TECHNIQUE:  Noncontrast 3D time-of-flight MRA head with 3 dimensional MIP reformats.    In addition vessel wall imaging: T1 3D space imaging through the Kiana Thompson pre and postcontrast. Coronal and sagittal reformatted imaging from the axial source imaging.Ten ML of Gadavist contrast was infused intravenously.    FINDINGS:  MRA head: Anterior circulation: Bilateral distal cervical, petrous, cavernous and supraclinoid segments of the ICAs are patent without significant focal stenosis. There is a posterior projected outpouching of the left communicating segment of the ICA measuring approximately 0.5 cm compatible with aneurysm. There is a separate more globular outpouching along the left proximal M2 segment of the MCA measuring approximately 1.1 cm  image 67 series 5 compatible with additional aneurysm    No definite right middle cerebral artery or anterior cerebral artery aneurysm.    Posterior circulation: Distal vertebral arteries, basilar artery and posterior cerebral arteries are patent. There is a outpouching along the tip of the basilar aneurysm measuring approximately 0.8 cm image 81 series 5 compatible with additional aneurysm.    Vessel wall imaging: There is component of abnormal wall enhancement associated with the left communicating ICA aneurysm which measures approximately 0.3 cm in thickness clinical correlation advised.    There is a focal area of questioned additional abnormal wall enhancement associated with the anterior aspect of the left MCA aneurysm this measures approximately 0.2 cm image 21 series 12    No abnormal wall enhancement associated with the basilar tip aneurysm    This report was flagged in Epic as abnormal.  Impression: MRA head: Globular left M2 MCA aneurysm, left communicating ICA aneurysm and basilar tip aneurysms again seen relatively stable as detailed above    Vessel wall imaging: Please note there is abnormal wall enhancement associated with the left communicating segment ICA aneurysm as well as a more focal punctate area of a questioned abnormal wall enhancement associated with the left MCA aneurysm as detailed above clinical correlation advised    Wall enhancement associated with the basilar tip aneurysm.    Electronically signed by: Chris Monson DO  Date:    03/17/2024  Time:    10:48            Path:   5/2021 STOMACH, ESD:   Adenocarcinoma, moderate to poorly differentiated   Tumor invades deep layers of submuocsa with deep margin extensively positive   Deep margin is extensively positive   Lateral margins are negative for neoplasia or malignancy   Extensive foveolar hyperplasia with intestinal metaplasia   SYNOPTIC REPORT   Specimen: Stomach   Procedure: Endoscopic submuocsal dissection   Tumor size: 3.0 x 2.2 cm    Tumor type: Adenocarcinoma, intestinal type   Histologic grade: Moderately to poorly differentiated   Microscopic extent of the tumor: Tumor invades deep layers of submuocsa with   deep margin extensively positive   Margins:  Deep margin is extensively positiv e; Lateral margins are negative   for neoplasia or malignancy   Lymphovascular invasion: Focal positive   Perineural invasion: Present   Pathologic staging: p T1 (at-least) Nx Mx   Number of lymph nodes examined: Not submitted     3/2024 ESD Gastric antrum, ESD scar, biopsy:   Antral mucosa with mild chronic inflammation and reactive changes.    No H.pylori identified on H&E stain slide.    No intestinal metaplasia or dysplasia.       Assessment:       1. Gastric adenocarcinoma          Plan:             # Gastric adenocarcinoma - Patient was diagnosed in 2021 with T1 gastric adenocarcinoma sp ESD. She received 4 cycles of FLOT 7 - 9/2021 but was not felt to be a surgical candidate after chemotherapy due to comorbidities. Patient subsequently treated with chemoradiation 2-3/2022. She follows with Dr. Brown, most recent surveillance EGD without evidence of recurrence. Last CT CAP with Dr. Pelaez 11/2023.   - NCCN guidelines recommend imaging q6m x2y and then annually up to 5y, however since patient did not have a gastrectomy will consider prolonged imaging surveillance  - EUS per Dr. Carballo     # HTN - /52, advise to hold Norvasc, continue Lisinopril. Patient did not take antihypertensives today. Continue home BP monitoring.     Follow up: 6 months     The above information has been reviewed with the patient and all questions have been answered to their apparent satisfaction.  They understand that they can call the clinic with any questions.    Rossana Motley MD  Hematology/Oncology  [facility]      Med Onc Chart Routing      Follow up with physician 6 months. aurelia   Follow up with FREEDOM    Infusion scheduling note    Injection  scheduling note    Labs CBC and CMP   Scheduling:  Preferred lab:  Lab interval:  prior to CT   Imaging CT chest abdomen pelvis   CT CAP at [facility] 5/2024   Pharmacy appointment    Other referrals

## 2024-04-22 NOTE — Clinical Note
Hi - I am taking over surveillance from Dr. Pelaez, I'm planning to extend CT surveillance since she did not have a gastrectomy. Let me know if there is anything else you need from me.   Rossana

## 2024-04-24 ENCOUNTER — TELEPHONE (OUTPATIENT)
Dept: HEMATOLOGY/ONCOLOGY | Facility: CLINIC | Age: 74
End: 2024-04-24
Payer: MEDICARE

## 2024-04-24 NOTE — TELEPHONE ENCOUNTER
----- Message from Jazz Oskar sent at 4/24/2024  9:50 AM CDT -----  Regarding: Reschedule Existing Appointment  Contact: Ca Coats  Reschedule Existing Appointment     Current appt date:05/27     Type of appt :Ct scan     Physician:Tolu     Reason for rescheduling:Patient is calling stating that her ct is scheduled at wrong location [facility] . Patient wants it at [facility] location. Requesting a call back     Caller:Ca Coats      Contact Preference:[phone] (home)

## 2024-04-26 ENCOUNTER — INFUSION (OUTPATIENT)
Dept: INFUSION THERAPY | Facility: OTHER | Age: 74
End: 2024-04-26
Attending: STUDENT IN AN ORGANIZED HEALTH CARE EDUCATION/TRAINING PROGRAM
Payer: MEDICARE

## 2024-04-26 DIAGNOSIS — C16.9 GASTRIC ADENOCARCINOMA: Primary | ICD-10-CM

## 2024-04-26 PROCEDURE — A4216 STERILE WATER/SALINE, 10 ML: HCPCS | Mod: HCNC | Performed by: STUDENT IN AN ORGANIZED HEALTH CARE EDUCATION/TRAINING PROGRAM

## 2024-04-26 PROCEDURE — 96523 IRRIG DRUG DELIVERY DEVICE: CPT | Mod: HCNC

## 2024-04-26 PROCEDURE — 25000003 PHARM REV CODE 250: Mod: HCNC | Performed by: STUDENT IN AN ORGANIZED HEALTH CARE EDUCATION/TRAINING PROGRAM

## 2024-04-26 PROCEDURE — 63600175 PHARM REV CODE 636 W HCPCS: Mod: HCNC | Performed by: STUDENT IN AN ORGANIZED HEALTH CARE EDUCATION/TRAINING PROGRAM

## 2024-04-26 RX ORDER — HEPARIN 100 UNIT/ML
500 SYRINGE INTRAVENOUS
OUTPATIENT
Start: 2024-04-26

## 2024-04-26 RX ORDER — SODIUM CHLORIDE 0.9 % (FLUSH) 0.9 %
10 SYRINGE (ML) INJECTION
OUTPATIENT
Start: 2024-04-26

## 2024-04-26 RX ORDER — SODIUM CHLORIDE 0.9 % (FLUSH) 0.9 %
10 SYRINGE (ML) INJECTION
Status: DISCONTINUED | OUTPATIENT
Start: 2024-04-26 | End: 2024-04-26 | Stop reason: HOSPADM

## 2024-04-26 RX ORDER — HEPARIN 100 UNIT/ML
500 SYRINGE INTRAVENOUS
Status: DISCONTINUED | OUTPATIENT
Start: 2024-04-26 | End: 2024-04-26 | Stop reason: HOSPADM

## 2024-04-26 RX ADMIN — HEPARIN 500 UNITS: 100 SYRINGE at 09:04

## 2024-04-26 RX ADMIN — SODIUM CHLORIDE, PRESERVATIVE FREE 10 ML: 5 INJECTION INTRAVENOUS at 09:04

## 2024-04-29 ENCOUNTER — TELEPHONE (OUTPATIENT)
Dept: PAIN MEDICINE | Facility: CLINIC | Age: 74
End: 2024-04-29
Payer: MEDICARE

## 2024-04-29 NOTE — TELEPHONE ENCOUNTER
----- Message from Emma Schultz sent at 4/29/2024  1:09 PM CDT -----  Regarding: self  Type: RX Refill Request     Who Called:self     Have you contacted your pharmacy:no     Refill     RX Name and Strength:oxyCODONE-acetaminophen (PERCOCET) 5-325 mg per tablet     Preferred Pharmacy with phone number:   [facility]  [address] [id number]  Phone: [phone] Fax: [phone]            Local or Mail Order:local     Would the patient rather a call back or a response via [facility]?call     Best Call Back Number:[phone]       Additional Information:     Thank you.

## 2024-04-29 NOTE — TELEPHONE ENCOUNTER
Staff spoke with patient. Staff informed patient that her meds will not be filled until its time. Patient verbalized understanding and states she'll talk to Dr. Clark on tomorrow at her appt.

## 2024-04-30 ENCOUNTER — TELEPHONE (OUTPATIENT)
Dept: PAIN MEDICINE | Facility: CLINIC | Age: 74
End: 2024-04-30
Payer: MEDICARE

## 2024-04-30 ENCOUNTER — PROCEDURE VISIT (OUTPATIENT)
Dept: PAIN MEDICINE | Facility: CLINIC | Age: 74
End: 2024-04-30
Payer: MEDICARE

## 2024-04-30 VITALS
SYSTOLIC BLOOD PRESSURE: 100 MMHG | RESPIRATION RATE: 18 BRPM | WEIGHT: 260.13 LBS | DIASTOLIC BLOOD PRESSURE: 56 MMHG | HEIGHT: 66 IN | TEMPERATURE: 98 F | BODY MASS INDEX: 41.8 KG/M2 | HEART RATE: 72 BPM | OXYGEN SATURATION: 100 %

## 2024-04-30 DIAGNOSIS — M17.12 PRIMARY OSTEOARTHRITIS OF LEFT KNEE: ICD-10-CM

## 2024-04-30 DIAGNOSIS — G89.29 CHRONIC PAIN OF LEFT KNEE: Primary | ICD-10-CM

## 2024-04-30 DIAGNOSIS — M25.562 CHRONIC PAIN OF LEFT KNEE: Primary | ICD-10-CM

## 2024-04-30 DIAGNOSIS — G89.4 CHRONIC PAIN SYNDROME: ICD-10-CM

## 2024-04-30 PROCEDURE — 20611 DRAIN/INJ JOINT/BURSA W/US: CPT | Mod: HCNC,LT,S$GLB, | Performed by: ANESTHESIOLOGY

## 2024-04-30 RX ORDER — OXYCODONE AND ACETAMINOPHEN 5; 325 MG/1; MG/1
1 TABLET ORAL EVERY 8 HOURS PRN
Qty: 90 TABLET | Refills: 0 | Status: SHIPPED | OUTPATIENT
Start: 2024-04-30 | End: 2024-05-24 | Stop reason: SDUPTHER

## 2024-04-30 NOTE — PROCEDURES
Procedure Note:    left Knee Synvisc one injection under fluoroscopy    04/30/2024                               Surgeon: Tevin Clark MD    Assistant: None    Pre-Op Diagnosis:  left chronic knee pain     Post-Op Diagnosis: left chronic knee pain     EBL: None    Complications: None    Specimens: None    Description of procedure:    After written consent was obtained, patient placed in supine position.  The area over the  lateral aspect of the left  knee(s) joint prepped with chlorhexidine. A 22 gauge spinal needle was then advanced under fluoroscopy in the AP views into the knee joint. After negative aspiration the following was injected: 6 ml  of Synvisc One into the joint space. Needle was withdrawn and a sterile band-aid applied to the skin.    Patient tolerated the procedure well, and was reporting improvement of pain symptoms after the injection.  She was discharged from the clinic in stable condition.      I have reviewed the notes, assessments, and/or procedures performed by resident/fellow, I concur with her/his documentation of Ca Coats .    Tevin Clark MD

## 2024-04-30 NOTE — TELEPHONE ENCOUNTER
----- Message from Yvonne Pineda sent at 4/30/2024  2:39 PM CDT -----  Regarding: Refill Request    Who Called: Patient        New Prescription or Refill : Refill    RX Name and Strength:   oxyCODONE-acetaminophen (PERCOCET) 5-325 mg per tablet      RX Name and Strength:         RX Name and Strength:      30 day or 90 day RX:     Preferred Pharmacy:[facility]        Would the patient rather a call back or a response via [facility]?    Best Call Back Number:  [phone]    Additional Information:

## 2024-04-30 NOTE — TELEPHONE ENCOUNTER
Patient requesting refill on  oxyCODONE-acetaminophen (PERCOCET) 5-325 mg per tablet   Last office visit 4/16/24   shows last refill on 4/4/24  Patient does have a pain contract on file with [facility]  Patient last UDS 4/16/24 was consistent with current therapy    CODEINE  Not Detected Not Detected Not Detected   Comment: INTERPRETIVE INFORMATION: Codeine, U  Positive Cutoff: 40 ng/mL  Methodology: Mass Spectrometry   MORPHINE  Not Detected Not Detected Not Detected   Comment: INTERPRETIVE INFORMATION:Morphine, U  Positive Cutoff: 20 ng/mL  Methodology: Mass Spectrometry   6-ACETYLMORPHINE  Not Detected Not Detected Not Detected   Comment: INTERPRETIVE INFORMATION:6-acetylmorphine, U  Positive Cutoff: 20 ng/mL  Methodology: Mass Spectrometry   OXYCODONE  Present Present Present   Comment: INTERPRETIVE INFORMATION:Oxycodone, U  Positive Cutoff: 40 ng/mL  Methodology: Mass Spectrometry   NOROYXCODONE  Present Present Present   Comment: INTERPRETIVE INFORMATION:Noroxycodone, U  Positive Cutoff: 100 ng/mL  Methodology: Mass Spectrometry   OXYMORPHONE  Present Present Not Detected   Comment: INTERPRETIVE INFORMATION:Oxymorphone, U  Positive Cutoff: 40 ng/mL  Methodology: Mass Spectrometry   NOROXYMORPHONE  Present Present Not Detected   Comment: INTERPRETIVE INFORMATION:Noroxymorphone, U  Positive Cutoff: 100 ng/mL  Methodology: Mass Spectrometry   HYDROCODONE  Not Detected Not Detected Not Detected   Comment: INTERPRETIVE INFORMATION:Hydrocodone, U  Positive Cutoff: 40 ng/mL  Methodology: Mass Spectrometry   NORHYDROCODONE  Not Detected Not Detected Not Detected   Comment: INTERPRETIVE INFORMATION:Norhydrocodone, U  Positive Cutoff: 100 ng/mL  Methodology: Mass Spectrometry   HYDROMORPHONE  Not Detected Not Detected Not Detected   Comment: INTERPRETIVE INFORMATION:Hydromorphone, U  Positive Cutoff: 20 ng/mL  Methodology: Mass Spectrometry   BUPRENORPHINE  Not Detected Not Detected  Not Detected   Comment: INTERPRETIVE INFORMATION:Buprenorphine, U  Positive Cutoff: 5 ng/mL  Methodology: Mass Spectrometry   NORUBPRENORPHINE  Not Detected Not Detected Not Detected   Comment: INTERPRETIVE INFORMATION:Norbuprenorphine, U  Positive Cutoff: 20 ng/mL  Methodology: Mass Spectrometry   FENTANYL  Not Detected Not Detected Not Detected   Comment: INTERPRETIVE INFORMATION:Fentanyl, U  Positive Cutoff: 2 ng/mL  Methodology: Mass Spectrometry   NORFENTANYL  Not Detected Not Detected Not Detected   Comment: INTERPRETIVE INFORMATION:Norfentanyl, U  Positive Cutoff: 2 ng/mL  Methodology: Mass Spectrometry   MEPERIDINE METABOLITE  Not Detected Not Detected Not Detected   Comment: INTERPRETIVE INFORMATION:Meperidine metabolite, U  Positive Cutoff: 50 ng/mL  Methodology: Mass Spectrometry   TAPENTADOL  Not Detected Not Detected Not Detected   Comment: INTERPRETIVE INFORMATION:Tapentadol, U  Positive Cutoff: 100 ng/mL  Methodology: Mass Spectrometry   TAPENTADOL-O-SULF  Not Detected Not Detected Not Detected   Comment: INTERPRETIVE INFORMATION:Tapentadol-o-Sulf, U  Positive Cutoff: 200 ng/mL  Methodology: Mass Spectrometry   METHADONE  Negative Not Detected Not Detected   Comment: Presumptive negative by immunoassay. Testing by mass  spectrometry is available on request.  INTERPRETIVE INFORMATION: Methadone Screen, U  Positive Cutoff: 150 ng/mL  Methodology: Immunoassay   TRAMADOL  Negative Not Detected Not Detected   Comment: Presumptive negative by immunoassay. Testing by mass  spectrometry is available on request.  INTERPRETIVE INFORMATION:Tramadol Screen, U  Positive Cutoff: 100 ng/mL  Methodology: Immunoassay   AMPHETAMINE  Not Detected Not Detected Not Detected   Comment: INTERPRETIVE INFORMATION:Amphetamine, U  Positive Cutoff: 50 ng/mL  Methodology: Mass Spectrometry   METHAMPHETAMINE  Not Detected Not Detected Not Detected   Comment: INTERPRETIVE INFORMATION:Methamphetamine, U  Positive Cutoff: 200  ng/mL  Methodology: Mass Spectrometry   MDMA- ECSTASY  Not Detected Not Detected Not Detected   Comment: INTERPRETIVE INFORMATION:MDMA, U  Positive Cutoff: 200 ng/mL  Methodology: Mass Spectrometry   MDA  Not Detected Not Detected Not Detected   Comment: INTERPRETIVE INFORMATION:MDA, U  Positive Cutoff: 200 ng/mL  Methodology: Mass Spectrometry   MDEA- Genoveva  Not Detected Not Detected Not Detected   Comment: INTERPRETIVE INFORMATION:MDEA, U  Positive Cutoff: 200 ng/mL  Methodology: Mass Spectrometry   METHYLPHENIDATE  Not Detected Not Detected Not Detected   Comment: INTERPRETIVE INFORMATION:Methylphenidate, U  Positive Cutoff: 100 ng/mL  Methodology: Mass Spectrometry   PHENTERMINE  Not Detected Not Detected Not Detected   Comment: INTERPRETIVE INFORMATION:Phentermine, U  Positive Cutoff: 100 ng/mL  Methodology: Mass Spectrometry   BENZOYLECGONINE  Negative Not Detected Not Detected   Comment: Presumptive negative by immunoassay. Testing by mass  spectrometry is available on request.  INTERPRETIVE INFORMATION:Cocaine Screen, U  Positive Cutoff: 150 ng/mL  Methodology: Immunoassay   ALPRAZOLAM  Not Detected Not Detected Not Detected   Comment: INTERPRETIVE INFORMATION:Alprazolam, U  Positive Cutoff: 40 ng/mL  Methodology: Mass Spectrometry   ALPHA-OH-ALPRAZOLAM  Not Detected Not Detected Not Detected   Comment: INTERPRETIVE INFORMATION:Alpha-OH-Alprazolam, U  Positive Cutoff: 20 ng/mL  Methodology: Mass Spectrometry   CLONAZEPAM  Not Detected Not Detected Not Detected   Comment: INTERPRETIVE INFORMATION:Clonazepam, U  Positive Cutoff: 20 ng/mL  Methodology: Mass Spectrometry   7-AMINOCLONAZEPAM  Not Detected Not Detected Not Detected   Comment: INTERPRETIVE INFORMATION:7-Aminoclonazepam, U  Positive Cutoff: 40 ng/mL  Methodology: Mass Spectrometry   DIAZEPAM  Not Detected Not Detected Not Detected   Comment: INTERPRETIVE INFORMATION:Diazepam, U  Positive Cutoff: 50 ng/mL  Methodology: Mass Spectrometry   NORDIAZEPAM   Not Detected Not Detected Not Detected   Comment: INTERPRETIVE INFORMATION:Nordiazepam, U  Positive Cutoff: 50 ng/mL  Methodology: Mass Spectrometry   OXAZEPAM  Not Detected Not Detected Not Detected   Comment: INTERPRETIVE INFORMATION:Oxazepam, U  Positive Cutoff: 50 ng/mL  Methodology: Mass Spectrometry   TEMAZEPAM  Not Detected Not Detected Not Detected   Comment: INTERPRETIVE INFORMATION:Temazepam, U  Positive Cutoff: 50 ng/mL  Methodology: Mass Spectrometry   Lorazepam  Not Detected Not Detected Not Detected   Comment: INTERPRETIVE INFORMATION:Lorazepam, U  Positive Cutoff: 60 ng/mL  Methodology: Mass Spectrometry   MIDAZOLAM  Not Detected Not Detected Not Detected   Comment: INTERPRETIVE INFORMATION:Midazolam, U  Positive Cutoff: 20 ng/mL  Methodology: Mass Spectrometry   ZOLPIDEM  Not Detected Not Detected Not Detected   Comment: INTERPRETIVE INFORMATION:Zolpidem, U  Positive Cutoff: 20 ng/mL  Methodology: Mass Spectrometry   BARBITURATES  Negative Not Detected Not Detected   Comment: Presumptive negative by immunoassay. Testing by mass  spectrometry is available on request.  INTERPRETIVE INFORMATION:Barbiturates Screen, U  Positive Cutoff: 200 ng/mL  Methodology: Immunoassay   Creatinine, Urine 20.0 - 400.0 mg/dL 102.8 205.6 102.0   ETHYL GLUCURONIDE  Negative Present Not Detected   Comment: Presumptive negative by immunoassay. Testing by mass  spectrometry is available on request.  INTERPRETIVE INFORMATION:Ethyl Glucuronide Screen, U  Positive Cutoff: 500 ng/mL  Methodology: Immunoassay   MARIJUANA METABOLITE  Negative Not Detected Not Detected   Comment: Presumptive negative by immunoassay. Testing by mass  spectrometry is available on request.  INTERPRETIVE INFORMATION: THC (Cannabinoids) Screen, U  Positive Cutoff: 50 ng/mL  Methodology: Immunoassay   PCP  Negative Not Detected Not Detected   Comment: Presumptive negative by immunoassay. Testing by mass  spectrometry is available on  request.  INTERPRETIVE INFORMATION:Phencyclidine Screen, U  Positive Cutoff: 25 ng/mL  Methodology: Immunoassay   CARISOPRODOL  Negative Not Detected CM Not Detected CM   Comment: Presumptive negative by immunoassay. Testing by mass  spectrometry is available on request.  INTERPRETIVE INFORMATION: Carisoprodol Screen, U  Positive Cutoff: 100 ng/mL  Methodology: Immunoassay  The carisoprodol immunoassay has cross-reactivity to  carisoprodol and meprobamate.   Naloxone  Not Detected Not Detected    Comment: INTERPRETIVE INFORMATION:Naloxone, U  Positive Cutoff: 100 ng/mL  Methodology: Mass Spectrometry   Gabapentin  Present Present    Comment: INTERPRETIVE INFORMATION:Gabapentin, U  Positive Cutoff: 3,000 ng/mL  Methodology: Mass Spectrometry   Pregabalin  Not Detected Not Detected    Comment: INTERPRETIVE INFORMATION:Pregabalin, U  Positive Cutoff: 3,000 ng/mL  Methodology: Mass Spectrometry   Alpha-OH-Midazolam  Not Detected Not Detected    Comment: INTERPRETIVE INFORMATION:Alpha-OH-Midazolam, U  Positive Cutoff: 20 ng/mL  Methodology: Mass Spectrometry   Zolpidem Metabolite  Not Detected Not Detected    Comment: INTERPRETIVE INFORMATION:Zolpidem Metabolite, U  Positive Cutoff: 100 ng/mL

## 2024-05-08 ENCOUNTER — TELEPHONE (OUTPATIENT)
Dept: NEUROSURGERY | Facility: CLINIC | Age: 74
End: 2024-05-08
Payer: MEDICARE

## 2024-05-08 NOTE — TELEPHONE ENCOUNTER
Returned call to pt. Explained that Dr. Simon's nurse is working on getting the angiogram scheduled. Once the nurse know the date and time will let pt know. Pt v/u.

## 2024-05-08 NOTE — TELEPHONE ENCOUNTER
----- Message from Leonor Vo sent at 5/8/2024 10:36 AM CDT -----  Regarding: PT ADVICE  Contact: pt @[phone]  Pt requesting a call back to discuss plan of care please call pt @[phone]

## 2024-05-15 ENCOUNTER — TELEPHONE (OUTPATIENT)
Dept: NEUROSURGERY | Facility: CLINIC | Age: 74
End: 2024-05-15
Payer: MEDICARE

## 2024-05-15 NOTE — TELEPHONE ENCOUNTER
Returned call to pt. Explained tentatively scheduled for 06/06/24. No time yet. Once get the time will let her know. Silvestre gamble.

## 2024-05-15 NOTE — TELEPHONE ENCOUNTER
----- Message from Piero Neumann sent at 5/15/2024 10:16 AM CDT -----  Regarding: pt advice  Contact: pt @ [phone]  Pt is calling to be advised further of test that she is to be doing. Please call to advise further. Thank you for all you are doing.

## 2024-05-24 DIAGNOSIS — G89.4 CHRONIC PAIN SYNDROME: ICD-10-CM

## 2024-05-24 DIAGNOSIS — E78.2 MIXED HYPERLIPIDEMIA: ICD-10-CM

## 2024-05-24 RX ORDER — ATORVASTATIN CALCIUM 20 MG/1
TABLET, FILM COATED ORAL
Qty: 90 TABLET | Refills: 0 | Status: SHIPPED | OUTPATIENT
Start: 2024-05-24 | End: 2024-06-18

## 2024-05-24 NOTE — TELEPHONE ENCOUNTER
Patient requesting refill on oxycodone  Last office visit 04-16-24   shows last refill on 05-03-24  Patient does have a pain contract on file with [facility]  Patient last UDS 04-16-24 was consistent with current therapy    CODEINE  Not Detected Not Detected Not Detected   Comment: INTERPRETIVE INFORMATION: Codeine, U  Positive Cutoff: 40 ng/mL  Methodology: Mass Spectrometry   MORPHINE  Not Detected Not Detected Not Detected   Comment: INTERPRETIVE INFORMATION:Morphine, U  Positive Cutoff: 20 ng/mL  Methodology: Mass Spectrometry   6-ACETYLMORPHINE  Not Detected Not Detected Not Detected   Comment: INTERPRETIVE INFORMATION:6-acetylmorphine, U  Positive Cutoff: 20 ng/mL  Methodology: Mass Spectrometry   OXYCODONE  Present Present Present   Comment: INTERPRETIVE INFORMATION:Oxycodone, U  Positive Cutoff: 40 ng/mL  Methodology: Mass Spectrometry   NOROYXCODONE  Present Present Present   Comment: INTERPRETIVE INFORMATION:Noroxycodone, U  Positive Cutoff: 100 ng/mL  Methodology: Mass Spectrometry   OXYMORPHONE  Present Present Not Detected   Comment: INTERPRETIVE INFORMATION:Oxymorphone, U  Positive Cutoff: 40 ng/mL  Methodology: Mass Spectrometry   NOROXYMORPHONE  Present Present Not Detected   Comment: INTERPRETIVE INFORMATION:Noroxymorphone, U  Positive Cutoff: 100 ng/mL  Methodology: Mass Spectrometry   HYDROCODONE  Not Detected Not Detected Not Detected   Comment: INTERPRETIVE INFORMATION:Hydrocodone, U  Positive Cutoff: 40 ng/mL  Methodology: Mass Spectrometry   NORHYDROCODONE  Not Detected Not Detected Not Detected   Comment: INTERPRETIVE INFORMATION:Norhydrocodone, U  Positive Cutoff: 100 ng/mL  Methodology: Mass Spectrometry   HYDROMORPHONE  Not Detected Not Detected Not Detected   Comment: INTERPRETIVE INFORMATION:Hydromorphone, U  Positive Cutoff: 20 ng/mL  Methodology: Mass Spectrometry   BUPRENORPHINE  Not Detected Not Detected Not Detected   Comment: INTERPRETIVE  INFORMATION:Buprenorphine, U  Positive Cutoff: 5 ng/mL  Methodology: Mass Spectrometry   NORUBPRENORPHINE  Not Detected Not Detected Not Detected   Comment: INTERPRETIVE INFORMATION:Norbuprenorphine, U  Positive Cutoff: 20 ng/mL  Methodology: Mass Spectrometry   FENTANYL  Not Detected Not Detected Not Detected   Comment: INTERPRETIVE INFORMATION:Fentanyl, U  Positive Cutoff: 2 ng/mL  Methodology: Mass Spectrometry   NORFENTANYL  Not Detected Not Detected Not Detected   Comment: INTERPRETIVE INFORMATION:Norfentanyl, U  Positive Cutoff: 2 ng/mL  Methodology: Mass Spectrometry   MEPERIDINE METABOLITE  Not Detected Not Detected Not Detected   Comment: INTERPRETIVE INFORMATION:Meperidine metabolite, U  Positive Cutoff: 50 ng/mL  Methodology: Mass Spectrometry   TAPENTADOL  Not Detected Not Detected Not Detected   Comment: INTERPRETIVE INFORMATION:Tapentadol, U  Positive Cutoff: 100 ng/mL  Methodology: Mass Spectrometry   TAPENTADOL-O-SULF  Not Detected Not Detected Not Detected   Comment: INTERPRETIVE INFORMATION:Tapentadol-o-Sulf, U  Positive Cutoff: 200 ng/mL  Methodology: Mass Spectrometry   METHADONE  Negative Not Detected Not Detected   Comment: Presumptive negative by immunoassay. Testing by mass  spectrometry is available on request.  INTERPRETIVE INFORMATION: Methadone Screen, U  Positive Cutoff: 150 ng/mL  Methodology: Immunoassay   TRAMADOL  Negative Not Detected Not Detected   Comment: Presumptive negative by immunoassay. Testing by mass  spectrometry is available on request.  INTERPRETIVE INFORMATION:Tramadol Screen, U  Positive Cutoff: 100 ng/mL  Methodology: Immunoassay   AMPHETAMINE  Not Detected Not Detected Not Detected   Comment: INTERPRETIVE INFORMATION:Amphetamine, U  Positive Cutoff: 50 ng/mL  Methodology: Mass Spectrometry   METHAMPHETAMINE  Not Detected Not Detected Not Detected   Comment: INTERPRETIVE INFORMATION:Methamphetamine, U  Positive Cutoff: 200 ng/mL  Methodology: Mass Spectrometry    MDMA- ECSTASY  Not Detected Not Detected Not Detected   Comment: INTERPRETIVE INFORMATION:MDMA, U  Positive Cutoff: 200 ng/mL  Methodology: Mass Spectrometry   MDA  Not Detected Not Detected Not Detected   Comment: INTERPRETIVE INFORMATION:MDA, U  Positive Cutoff: 200 ng/mL  Methodology: Mass Spectrometry   MDEA- Genoveva  Not Detected Not Detected Not Detected   Comment: INTERPRETIVE INFORMATION:MDEA, U  Positive Cutoff: 200 ng/mL  Methodology: Mass Spectrometry   METHYLPHENIDATE  Not Detected Not Detected Not Detected   Comment: INTERPRETIVE INFORMATION:Methylphenidate, U  Positive Cutoff: 100 ng/mL  Methodology: Mass Spectrometry   PHENTERMINE  Not Detected Not Detected Not Detected   Comment: INTERPRETIVE INFORMATION:Phentermine, U  Positive Cutoff: 100 ng/mL  Methodology: Mass Spectrometry   BENZOYLECGONINE  Negative Not Detected Not Detected   Comment: Presumptive negative by immunoassay. Testing by mass  spectrometry is available on request.  INTERPRETIVE INFORMATION:Cocaine Screen, U  Positive Cutoff: 150 ng/mL  Methodology: Immunoassay   ALPRAZOLAM  Not Detected Not Detected Not Detected   Comment: INTERPRETIVE INFORMATION:Alprazolam, U  Positive Cutoff: 40 ng/mL  Methodology: Mass Spectrometry   ALPHA-OH-ALPRAZOLAM  Not Detected Not Detected Not Detected   Comment: INTERPRETIVE INFORMATION:Alpha-OH-Alprazolam, U  Positive Cutoff: 20 ng/mL  Methodology: Mass Spectrometry   CLONAZEPAM  Not Detected Not Detected Not Detected   Comment: INTERPRETIVE INFORMATION:Clonazepam, U  Positive Cutoff: 20 ng/mL  Methodology: Mass Spectrometry   7-AMINOCLONAZEPAM  Not Detected Not Detected Not Detected   Comment: INTERPRETIVE INFORMATION:7-Aminoclonazepam, U  Positive Cutoff: 40 ng/mL  Methodology: Mass Spectrometry   DIAZEPAM  Not Detected Not Detected Not Detected   Comment: INTERPRETIVE INFORMATION:Diazepam, U  Positive Cutoff: 50 ng/mL  Methodology: Mass Spectrometry   NORDIAZEPAM  Not Detected Not Detected Not Detected    Comment: INTERPRETIVE INFORMATION:Nordiazepam, U  Positive Cutoff: 50 ng/mL  Methodology: Mass Spectrometry   OXAZEPAM  Not Detected Not Detected Not Detected   Comment: INTERPRETIVE INFORMATION:Oxazepam, U  Positive Cutoff: 50 ng/mL  Methodology: Mass Spectrometry   TEMAZEPAM  Not Detected Not Detected Not Detected   Comment: INTERPRETIVE INFORMATION:Temazepam, U  Positive Cutoff: 50 ng/mL  Methodology: Mass Spectrometry   Lorazepam  Not Detected Not Detected Not Detected   Comment: INTERPRETIVE INFORMATION:Lorazepam, U  Positive Cutoff: 60 ng/mL  Methodology: Mass Spectrometry   MIDAZOLAM  Not Detected Not Detected Not Detected   Comment: INTERPRETIVE INFORMATION:Midazolam, U  Positive Cutoff: 20 ng/mL  Methodology: Mass Spectrometry   ZOLPIDEM  Not Detected Not Detected Not Detected   Comment: INTERPRETIVE INFORMATION:Zolpidem, U  Positive Cutoff: 20 ng/mL  Methodology: Mass Spectrometry   BARBITURATES  Negative Not Detected Not Detected   Comment: Presumptive negative by immunoassay. Testing by mass  spectrometry is available on request.  INTERPRETIVE INFORMATION:Barbiturates Screen, U  Positive Cutoff: 200 ng/mL  Methodology: Immunoassay   Creatinine, Urine 20.0 - 400.0 mg/dL 102.8 205.6 102.0   ETHYL GLUCURONIDE  Negative Present Not Detected   Comment: Presumptive negative by immunoassay. Testing by mass  spectrometry is available on request.  INTERPRETIVE INFORMATION:Ethyl Glucuronide Screen, U  Positive Cutoff: 500 ng/mL  Methodology: Immunoassay   MARIJUANA METABOLITE  Negative Not Detected Not Detected   Comment: Presumptive negative by immunoassay. Testing by mass  spectrometry is available on request.  INTERPRETIVE INFORMATION: THC (Cannabinoids) Screen, U  Positive Cutoff: 50 ng/mL  Methodology: Immunoassay   PCP  Negative Not Detected Not Detected   Comment: Presumptive negative by immunoassay. Testing by mass  spectrometry is available on request.  INTERPRETIVE INFORMATION:Phencyclidine Screen,  U  Positive Cutoff: 25 ng/mL  Methodology: Immunoassay   CARISOPRODOL  Negative Not Detected CM Not Detected CM   Comment: Presumptive negative by immunoassay. Testing by mass  spectrometry is available on request.  INTERPRETIVE INFORMATION: Carisoprodol Screen, U  Positive Cutoff: 100 ng/mL  Methodology: Immunoassay  The carisoprodol immunoassay has cross-reactivity to  carisoprodol and meprobamate.   Naloxone  Not Detected Not Detected    Comment: INTERPRETIVE INFORMATION:Naloxone, U  Positive Cutoff: 100 ng/mL  Methodology: Mass Spectrometry   Gabapentin  Present Present    Comment: INTERPRETIVE INFORMATION:Gabapentin, U  Positive Cutoff: 3,000 ng/mL  Methodology: Mass Spectrometry   Pregabalin  Not Detected Not Detected    Comment: INTERPRETIVE INFORMATION:Pregabalin, U  Positive Cutoff: 3,000 ng/mL  Methodology: Mass Spectrometry   Alpha-OH-Midazolam  Not Detected Not Detected    Comment: INTERPRETIVE INFORMATION:Alpha-OH-Midazolam, U  Positive Cutoff: 20 ng/mL  Methodology: Mass Spectrometry   Zolpidem Metabolite  Not Detected Not Detected    Comment: INTERPRETIVE INFORMATION:Zolpidem Metabolite, U  Positive Cutoff: 100 ng/mL  Methodology: Mass Spectrometry   PAIN MANAGEMENT DRUG PANEL  See Below See Below CM See Below

## 2024-05-24 NOTE — TELEPHONE ENCOUNTER
----- Message from Yvonne Pineda sent at 5/24/2024 12:13 PM CDT -----  Regarding: Refill Request    Who Called: Patient        New Prescription or Refill : Refill    RX Name and Strength:   oxyCODONE-acetaminophen (PERCOCET) 5-325 mg per tablet      RX Name and Strength:         RX Name and Strength:      30 day or 90 day RX:     Preferred Pharmacy:[facility]        Would the patient rather a call back or a response via [facility]?    Best Call Back Number:   [phone]    Additional Information:

## 2024-05-24 NOTE — TELEPHONE ENCOUNTER
No care due was identified.  [facility] Embedded Care Due Messages. Reference number: [id number].   5/24/2024 1:36:48 AM CDT

## 2024-05-24 NOTE — TELEPHONE ENCOUNTER
Refill Routing Note   Medication(s) are not appropriate for processing by [facility] for the following reason(s):        Required labs outdated    [facility] action(s):  Defer               Appointments  past 12m or future 3m with PCP    Date Provider   Last Visit   5/29/2023 Lei Garcia MD   Next Visit   6/6/2024 Lei Garcia MD   ED visits in past 90 days: 0        Note composed:7:45 AM 05/24/2024

## 2024-05-27 ENCOUNTER — HOSPITAL ENCOUNTER (OUTPATIENT)
Dept: RADIOLOGY | Facility: OTHER | Age: 74
Discharge: HOME | End: 2024-05-27
Attending: STUDENT IN AN ORGANIZED HEALTH CARE EDUCATION/TRAINING PROGRAM
Payer: MEDICARE

## 2024-05-27 ENCOUNTER — TELEPHONE (OUTPATIENT)
Dept: HEMATOLOGY/ONCOLOGY | Facility: CLINIC | Age: 74
End: 2024-05-27
Payer: MEDICARE

## 2024-05-27 DIAGNOSIS — C16.9 GASTRIC ADENOCARCINOMA: ICD-10-CM

## 2024-05-27 DIAGNOSIS — R91.8 PULMONARY NODULES: Primary | ICD-10-CM

## 2024-05-27 PROCEDURE — 71260 CT THORAX DX C+: CPT | Mod: 26,HCNC,, | Performed by: STUDENT IN AN ORGANIZED HEALTH CARE EDUCATION/TRAINING PROGRAM

## 2024-05-27 PROCEDURE — 25500020 PHARM REV CODE 255: Mod: HCNC | Performed by: STUDENT IN AN ORGANIZED HEALTH CARE EDUCATION/TRAINING PROGRAM

## 2024-05-27 PROCEDURE — 74177 CT ABD & PELVIS W/CONTRAST: CPT | Mod: 26,HCNC,, | Performed by: STUDENT IN AN ORGANIZED HEALTH CARE EDUCATION/TRAINING PROGRAM

## 2024-05-27 PROCEDURE — A9698 NON-RAD CONTRAST MATERIALNOC: HCPCS | Mod: HCNC | Performed by: STUDENT IN AN ORGANIZED HEALTH CARE EDUCATION/TRAINING PROGRAM

## 2024-05-27 PROCEDURE — 74177 CT ABD & PELVIS W/CONTRAST: CPT | Mod: TC,HCNC

## 2024-05-27 RX ADMIN — IOHEXOL 1000 ML: 12 SOLUTION ORAL at 11:05

## 2024-05-27 RX ADMIN — IOHEXOL 100 ML: 350 INJECTION, SOLUTION INTRAVENOUS at 11:05

## 2024-05-28 DIAGNOSIS — G89.4 CHRONIC PAIN SYNDROME: ICD-10-CM

## 2024-05-28 RX ORDER — OXYCODONE AND ACETAMINOPHEN 5; 325 MG/1; MG/1
1 TABLET ORAL EVERY 8 HOURS PRN
Qty: 90 TABLET | Refills: 0 | Status: SHIPPED | OUTPATIENT
Start: 2024-05-28 | End: 2024-07-03

## 2024-05-28 NOTE — TELEPHONE ENCOUNTER
----- Message from Irina Torrez sent at 5/28/2024 11:20 AM CDT -----  Regarding: refill  Name of caller: Ca       What is the requesting detail: requesting a refill for oxyCODONE-acetaminophen (PERCOCET) 5-325 mg per tablet        [facility]        Can the clinic reply by [facility]:       What number to call back:

## 2024-05-28 NOTE — TELEPHONE ENCOUNTER
Patient requesting refill on PERCOCET   Last office visit 4/16/24   shows last refill on 5/3/24  Patient does have a pain contract on file with [facility]  Patient last UDS 4/16/24 was consistent with current therapy    CODEINE  Not Detected Not Detected Not Detected   Comment: INTERPRETIVE INFORMATION: Codeine, U  Positive Cutoff: 40 ng/mL  Methodology: Mass Spectrometry   MORPHINE  Not Detected Not Detected Not Detected   Comment: INTERPRETIVE INFORMATION:Morphine, U  Positive Cutoff: 20 ng/mL  Methodology: Mass Spectrometry   6-ACETYLMORPHINE  Not Detected Not Detected Not Detected   Comment: INTERPRETIVE INFORMATION:6-acetylmorphine, U  Positive Cutoff: 20 ng/mL  Methodology: Mass Spectrometry   OXYCODONE  Present Present Present   Comment: INTERPRETIVE INFORMATION:Oxycodone, U  Positive Cutoff: 40 ng/mL  Methodology: Mass Spectrometry   NOROYXCODONE  Present Present Present   Comment: INTERPRETIVE INFORMATION:Noroxycodone, U  Positive Cutoff: 100 ng/mL  Methodology: Mass Spectrometry   OXYMORPHONE  Present Present Not Detected   Comment: INTERPRETIVE INFORMATION:Oxymorphone, U  Positive Cutoff: 40 ng/mL  Methodology: Mass Spectrometry   NOROXYMORPHONE  Present Present Not Detected   Comment: INTERPRETIVE INFORMATION:Noroxymorphone, U  Positive Cutoff: 100 ng/mL  Methodology: Mass Spectrometry   HYDROCODONE  Not Detected Not Detected Not Detected   Comment: INTERPRETIVE INFORMATION:Hydrocodone, U  Positive Cutoff: 40 ng/mL  Methodology: Mass Spectrometry   NORHYDROCODONE  Not Detected Not Detected Not Detected   Comment: INTERPRETIVE INFORMATION:Norhydrocodone, U  Positive Cutoff: 100 ng/mL  Methodology: Mass Spectrometry   HYDROMORPHONE  Not Detected Not Detected Not Detected   Comment: INTERPRETIVE INFORMATION:Hydromorphone, U  Positive Cutoff: 20 ng/mL  Methodology: Mass Spectrometry   BUPRENORPHINE  Not Detected Not Detected Not Detected   Comment: INTERPRETIVE  INFORMATION:Buprenorphine, U  Positive Cutoff: 5 ng/mL  Methodology: Mass Spectrometry   NORUBPRENORPHINE  Not Detected Not Detected Not Detected   Comment: INTERPRETIVE INFORMATION:Norbuprenorphine, U  Positive Cutoff: 20 ng/mL  Methodology: Mass Spectrometry   FENTANYL  Not Detected Not Detected Not Detected   Comment: INTERPRETIVE INFORMATION:Fentanyl, U  Positive Cutoff: 2 ng/mL  Methodology: Mass Spectrometry   NORFENTANYL  Not Detected Not Detected Not Detected   Comment: INTERPRETIVE INFORMATION:Norfentanyl, U  Positive Cutoff: 2 ng/mL  Methodology: Mass Spectrometry   MEPERIDINE METABOLITE  Not Detected Not Detected Not Detected   Comment: INTERPRETIVE INFORMATION:Meperidine metabolite, U  Positive Cutoff: 50 ng/mL  Methodology: Mass Spectrometry   TAPENTADOL  Not Detected Not Detected Not Detected   Comment: INTERPRETIVE INFORMATION:Tapentadol, U  Positive Cutoff: 100 ng/mL  Methodology: Mass Spectrometry   TAPENTADOL-O-SULF  Not Detected Not Detected Not Detected   Comment: INTERPRETIVE INFORMATION:Tapentadol-o-Sulf, U  Positive Cutoff: 200 ng/mL  Methodology: Mass Spectrometry   METHADONE  Negative Not Detected Not Detected   Comment: Presumptive negative by immunoassay. Testing by mass  spectrometry is available on request.  INTERPRETIVE INFORMATION: Methadone Screen, U  Positive Cutoff: 150 ng/mL  Methodology: Immunoassay   TRAMADOL  Negative Not Detected Not Detected   Comment: Presumptive negative by immunoassay. Testing by mass  spectrometry is available on request.  INTERPRETIVE INFORMATION:Tramadol Screen, U  Positive Cutoff: 100 ng/mL  Methodology: Immunoassay   AMPHETAMINE  Not Detected Not Detected Not Detected   Comment: INTERPRETIVE INFORMATION:Amphetamine, U  Positive Cutoff: 50 ng/mL  Methodology: Mass Spectrometry   METHAMPHETAMINE  Not Detected Not Detected Not Detected   Comment: INTERPRETIVE INFORMATION:Methamphetamine, U  Positive Cutoff: 200 ng/mL  Methodology: Mass Spectrometry    MDMA- ECSTASY  Not Detected Not Detected Not Detected   Comment: INTERPRETIVE INFORMATION:MDMA, U  Positive Cutoff: 200 ng/mL  Methodology: Mass Spectrometry   MDA  Not Detected Not Detected Not Detected   Comment: INTERPRETIVE INFORMATION:MDA, U  Positive Cutoff: 200 ng/mL  Methodology: Mass Spectrometry   MDEA- Genoveva  Not Detected Not Detected Not Detected   Comment: INTERPRETIVE INFORMATION:MDEA, U  Positive Cutoff: 200 ng/mL  Methodology: Mass Spectrometry   METHYLPHENIDATE  Not Detected Not Detected Not Detected   Comment: INTERPRETIVE INFORMATION:Methylphenidate, U  Positive Cutoff: 100 ng/mL  Methodology: Mass Spectrometry   PHENTERMINE  Not Detected Not Detected Not Detected   Comment: INTERPRETIVE INFORMATION:Phentermine, U  Positive Cutoff: 100 ng/mL  Methodology: Mass Spectrometry   BENZOYLECGONINE  Negative Not Detected Not Detected   Comment: Presumptive negative by immunoassay. Testing by mass  spectrometry is available on request.  INTERPRETIVE INFORMATION:Cocaine Screen, U  Positive Cutoff: 150 ng/mL  Methodology: Immunoassay   ALPRAZOLAM  Not Detected Not Detected Not Detected   Comment: INTERPRETIVE INFORMATION:Alprazolam, U  Positive Cutoff: 40 ng/mL  Methodology: Mass Spectrometry   ALPHA-OH-ALPRAZOLAM  Not Detected Not Detected Not Detected   Comment: INTERPRETIVE INFORMATION:Alpha-OH-Alprazolam, U  Positive Cutoff: 20 ng/mL  Methodology: Mass Spectrometry   CLONAZEPAM  Not Detected Not Detected Not Detected   Comment: INTERPRETIVE INFORMATION:Clonazepam, U  Positive Cutoff: 20 ng/mL  Methodology: Mass Spectrometry   7-AMINOCLONAZEPAM  Not Detected Not Detected Not Detected   Comment: INTERPRETIVE INFORMATION:7-Aminoclonazepam, U  Positive Cutoff: 40 ng/mL  Methodology: Mass Spectrometry   DIAZEPAM  Not Detected Not Detected Not Detected   Comment: INTERPRETIVE INFORMATION:Diazepam, U  Positive Cutoff: 50 ng/mL  Methodology: Mass Spectrometry   NORDIAZEPAM  Not Detected Not Detected Not Detected    Comment: INTERPRETIVE INFORMATION:Nordiazepam, U  Positive Cutoff: 50 ng/mL  Methodology: Mass Spectrometry   OXAZEPAM  Not Detected Not Detected Not Detected   Comment: INTERPRETIVE INFORMATION:Oxazepam, U  Positive Cutoff: 50 ng/mL  Methodology: Mass Spectrometry   TEMAZEPAM  Not Detected Not Detected Not Detected   Comment: INTERPRETIVE INFORMATION:Temazepam, U  Positive Cutoff: 50 ng/mL  Methodology: Mass Spectrometry   Lorazepam  Not Detected Not Detected Not Detected   Comment: INTERPRETIVE INFORMATION:Lorazepam, U  Positive Cutoff: 60 ng/mL  Methodology: Mass Spectrometry   MIDAZOLAM  Not Detected Not Detected Not Detected   Comment: INTERPRETIVE INFORMATION:Midazolam, U  Positive Cutoff: 20 ng/mL  Methodology: Mass Spectrometry   ZOLPIDEM  Not Detected Not Detected Not Detected   Comment: INTERPRETIVE INFORMATION:Zolpidem, U  Positive Cutoff: 20 ng/mL  Methodology: Mass Spectrometry   BARBITURATES  Negative Not Detected Not Detected   Comment: Presumptive negative by immunoassay. Testing by mass  spectrometry is available on request.  INTERPRETIVE INFORMATION:Barbiturates Screen, U  Positive Cutoff: 200 ng/mL  Methodology: Immunoassay   Creatinine, Urine 20.0 - 400.0 mg/dL 102.8 205.6 102.0   ETHYL GLUCURONIDE  Negative Present Not Detected   Comment: Presumptive negative by immunoassay. Testing by mass  spectrometry is available on request.  INTERPRETIVE INFORMATION:Ethyl Glucuronide Screen, U  Positive Cutoff: 500 ng/mL  Methodology: Immunoassay   MARIJUANA METABOLITE  Negative Not Detected Not Detected   Comment: Presumptive negative by immunoassay. Testing by mass  spectrometry is available on request.  INTERPRETIVE INFORMATION: THC (Cannabinoids) Screen, U  Positive Cutoff: 50 ng/mL  Methodology: Immunoassay   PCP  Negative Not Detected Not Detected   Comment: Presumptive negative by immunoassay. Testing by mass  spectrometry is available on request.  INTERPRETIVE INFORMATION:Phencyclidine Screen,  U  Positive Cutoff: 25 ng/mL  Methodology: Immunoassay   CARISOPRODOL  Negative Not Detected CM Not Detected CM   Comment: Presumptive negative by immunoassay. Testing by mass  spectrometry is available on request.  INTERPRETIVE INFORMATION: Carisoprodol Screen, U  Positive Cutoff: 100 ng/mL  Methodology: Immunoassay  The carisoprodol immunoassay has cross-reactivity to  carisoprodol and meprobamate.   Naloxone  Not Detected Not Detected    Comment: INTERPRETIVE INFORMATION:Naloxone, U  Positive Cutoff: 100 ng/mL  Methodology: Mass Spectrometry   Gabapentin  Present Present    Comment: INTERPRETIVE INFORMATION:Gabapentin, U  Positive Cutoff: 3,000 ng/mL  Methodology: Mass Spectrometry   Pregabalin  Not Detected Not Detected    Comment: INTERPRETIVE INFORMATION:Pregabalin, U  Positive Cutoff: 3,000 ng/mL  Methodology: Mass Spectrometry   Alpha-OH-Midazolam  Not Detected Not Detected    Comment: INTERPRETIVE INFORMATION:Alpha-OH-Midazolam, U  Positive Cutoff: 20 ng/mL  Methodology: Mass Spectrometry   Zolpidem Metabolite  Not Detected Not Detected    Comment: INTERPRETIVE INFORMATION:Zolpidem Metabolite, U  Positive Cutoff: 100 ng/mL  Methodology: Mass Spectrometry

## 2024-05-29 ENCOUNTER — TELEPHONE (OUTPATIENT)
Dept: PULMONOLOGY | Facility: CLINIC | Age: 74
End: 2024-05-29
Payer: MEDICARE

## 2024-05-29 ENCOUNTER — TELEPHONE (OUTPATIENT)
Dept: NEUROSURGERY | Facility: CLINIC | Age: 74
End: 2024-05-29
Payer: MEDICARE

## 2024-05-29 RX ORDER — OXYCODONE AND ACETAMINOPHEN 5; 325 MG/1; MG/1
1 TABLET ORAL EVERY 8 HOURS PRN
Qty: 90 TABLET | Refills: 0 | Status: SHIPPED | OUTPATIENT
Start: 2024-05-29 | End: 2024-06-28

## 2024-05-29 NOTE — TELEPHONE ENCOUNTER
I spoke with patient. Patient scheduled with Dr Crook on 6/13/24 at 10am Appointment mailed. Patient confirmed and verbalized understanding.

## 2024-05-29 NOTE — TELEPHONE ENCOUNTER
Called and spoke with pt. Instructed scheduled for ango on 06/03/24 for 7 am. Need to arrive to 3rd floor for 530 am.     Nothing to eat or drink after 12 midnight   Do not take any medications for sleep or anxiety night before procedure.  Do not take any diabetic oral medications the night before or morning of procedure (instructed to hold ozempic at this point. Due to take on Saturday.)  Take blood pressure medication with a small sip of water morning of procedure (eliquis, lisinopril, norvasc)  Labs to be done prior to procedure (already done)  Remove any nail polish or gel nail from one finger of each hand  Morning of procedure shower with antibacterial soap (dial, dove). Do not use hair spray, hair pins/clips or other hair products.Do not use perfume, powder, deodorant, after shave, makeup, mascara or false eyelashes or lotions after shower.  May wear glasses, contact lens, dentures or hearing aids but bring case to put them in when procedure started  Do not lift anything heavier than 10 pounds  Avoid strenuous activity for 2 weeks  Will need someone to drive home after procedure and for about 3 days after procedure  Need to be monitor for about 8 hours after home. Observe for shortness of breath, numbness/tingling in any extremity,difficulty speaking (report to nearest emergency room)  Keep incision clean and dry for 24 hrs. Remove bandage after 24 hrs and shower. Do not bathe or submerge in water until site is completely healed. Do not allow force of water to hit site until healed.     Pt repeated instructions especially which meds to take and not take.

## 2024-05-31 ENCOUNTER — LAB VISIT (OUTPATIENT)
Dept: LAB | Facility: HOSPITAL | Age: 74
End: 2024-05-31
Payer: MEDICARE

## 2024-05-31 DIAGNOSIS — I67.1 CEREBRAL ANEURYSM, NONRUPTURED: ICD-10-CM

## 2024-05-31 LAB
ANION GAP SERPL CALC-SCNC: 8 MMOL/L (ref 8–16)
BUN SERPL-MCNC: 17 MG/DL (ref 8–23)
CALCIUM SERPL-MCNC: 9.5 MG/DL (ref 8.7–10.5)
CHLORIDE SERPL-SCNC: 106 MMOL/L (ref 95–110)
CO2 SERPL-SCNC: 26 MMOL/L (ref 23–29)
CREAT SERPL-MCNC: 0.9 MG/DL (ref 0.5–1.4)
EST. GFR  (NO RACE VARIABLE): >60 ML/MIN/1.73 M^2
GLUCOSE SERPL-MCNC: 95 MG/DL (ref 70–110)
POTASSIUM SERPL-SCNC: 4.1 MMOL/L (ref 3.5–5.1)
SODIUM SERPL-SCNC: 140 MMOL/L (ref 136–145)

## 2024-05-31 PROCEDURE — 80048 BASIC METABOLIC PNL TOTAL CA: CPT | Mod: HCNC | Performed by: NEUROLOGICAL SURGERY

## 2024-05-31 PROCEDURE — 36415 COLL VENOUS BLD VENIPUNCTURE: CPT | Mod: HCNC | Performed by: NEUROLOGICAL SURGERY

## 2024-06-04 ENCOUNTER — DOCUMENTATION ONLY (OUTPATIENT)
Dept: PREADMISSION TESTING | Facility: HOSPITAL | Age: 74
End: 2024-06-04
Payer: MEDICARE

## 2024-06-04 NOTE — PRE-PROCEDURE INSTRUCTIONS
PRE-OP INSTRUCTIONS:  Instructed patient to have no food,milk or milk products after midnight 6/5/2024  It is ok to take AM medications with a few sips of water   Medication instructions for pm prior to and am of surgery reviewed.  Instructed patient to avoid taking vitamins,supplements,aspirin or ibuprofen the am of surgery.  Shower instructions provided    Patient denies any side effects or issues with anesthesia or sedation.      PATIENT HAS PORT RIGHT CHEST

## 2024-06-06 ENCOUNTER — ANESTHESIA (OUTPATIENT)
Dept: INTERVENTIONAL RADIOLOGY/VASCULAR | Facility: HOSPITAL | Age: 74
End: 2024-06-06
Payer: MEDICARE

## 2024-06-06 ENCOUNTER — HOSPITAL ENCOUNTER (OUTPATIENT)
Dept: INTERVENTIONAL RADIOLOGY/VASCULAR | Facility: HOSPITAL | Age: 74
Discharge: HOME | End: 2024-06-06
Attending: NEUROLOGICAL SURGERY
Payer: MEDICARE

## 2024-06-06 VITALS
RESPIRATION RATE: 15 BRPM | WEIGHT: 240 LBS | OXYGEN SATURATION: 95 % | DIASTOLIC BLOOD PRESSURE: 72 MMHG | HEART RATE: 78 BPM | BODY MASS INDEX: 38.57 KG/M2 | TEMPERATURE: 98 F | HEIGHT: 66 IN | SYSTOLIC BLOOD PRESSURE: 134 MMHG

## 2024-06-06 DIAGNOSIS — I67.1 CEREBRAL ANEURYSM, NONRUPTURED: ICD-10-CM

## 2024-06-06 PROCEDURE — 36224 PLACE CATH CAROTD ART: CPT | Mod: 50,HCNC,, | Performed by: NEUROLOGICAL SURGERY

## 2024-06-06 PROCEDURE — 37000009 HC ANESTHESIA EA ADD 15 MINS: Mod: HCNC

## 2024-06-06 PROCEDURE — 36226 PLACE CATH VERTEBRAL ART: CPT | Mod: 51,50,HCNC, | Performed by: NEUROLOGICAL SURGERY

## 2024-06-06 PROCEDURE — 63600175 PHARM REV CODE 636 W HCPCS: Mod: HCNC | Performed by: NURSE ANESTHETIST, CERTIFIED REGISTERED

## 2024-06-06 PROCEDURE — 36224 PLACE CATH CAROTD ART: CPT | Mod: 50,HCNC | Performed by: NEUROLOGICAL SURGERY

## 2024-06-06 PROCEDURE — 36226 PLACE CATH VERTEBRAL ART: CPT | Mod: 50,HCNC | Performed by: NEUROLOGICAL SURGERY

## 2024-06-06 PROCEDURE — 25000003 PHARM REV CODE 250: Mod: HCNC | Performed by: PSYCHIATRY & NEUROLOGY

## 2024-06-06 PROCEDURE — 37000008 HC ANESTHESIA 1ST 15 MINUTES: Mod: HCNC

## 2024-06-06 PROCEDURE — D9220A PRA ANESTHESIA: Mod: HCNC,ANES,, | Performed by: ANESTHESIOLOGY

## 2024-06-06 PROCEDURE — 25000003 PHARM REV CODE 250: Mod: HCNC | Performed by: STUDENT IN AN ORGANIZED HEALTH CARE EDUCATION/TRAINING PROGRAM

## 2024-06-06 PROCEDURE — 99900035 HC TECH TIME PER 15 MIN (STAT): Mod: HCNC

## 2024-06-06 PROCEDURE — 27000221 HC OXYGEN, UP TO 24 HOURS: Mod: HCNC

## 2024-06-06 PROCEDURE — 25000003 PHARM REV CODE 250: Mod: HCNC

## 2024-06-06 PROCEDURE — 94761 N-INVAS EAR/PLS OXIMETRY MLT: CPT | Mod: HCNC

## 2024-06-06 PROCEDURE — 63600175 PHARM REV CODE 636 W HCPCS: Mod: HCNC | Performed by: PSYCHIATRY & NEUROLOGY

## 2024-06-06 PROCEDURE — 25000003 PHARM REV CODE 250: Mod: HCNC | Performed by: NURSE ANESTHETIST, CERTIFIED REGISTERED

## 2024-06-06 PROCEDURE — C1760 CLOSURE DEV, VASC: HCPCS | Mod: HCNC

## 2024-06-06 PROCEDURE — D9220A PRA ANESTHESIA: Mod: HCNC,CRNA,, | Performed by: NURSE ANESTHETIST, CERTIFIED REGISTERED

## 2024-06-06 PROCEDURE — 25500020 PHARM REV CODE 255: Mod: HCNC | Performed by: NEUROLOGICAL SURGERY

## 2024-06-06 RX ORDER — ROCURONIUM BROMIDE 10 MG/ML
INJECTION, SOLUTION INTRAVENOUS
Status: DISCONTINUED | OUTPATIENT
Start: 2024-06-06 | End: 2024-06-06

## 2024-06-06 RX ORDER — PROPOFOL 10 MG/ML
VIAL (ML) INTRAVENOUS
Status: DISCONTINUED | OUTPATIENT
Start: 2024-06-06 | End: 2024-06-06

## 2024-06-06 RX ORDER — LIDOCAINE HYDROCHLORIDE 10 MG/ML
1 INJECTION, SOLUTION EPIDURAL; INFILTRATION; INTRACAUDAL; PERINEURAL ONCE
Status: COMPLETED | OUTPATIENT
Start: 2024-06-06 | End: 2024-06-06

## 2024-06-06 RX ORDER — DEXAMETHASONE SODIUM PHOSPHATE 4 MG/ML
INJECTION, SOLUTION INTRA-ARTICULAR; INTRALESIONAL; INTRAMUSCULAR; INTRAVENOUS; SOFT TISSUE
Status: DISCONTINUED | OUTPATIENT
Start: 2024-06-06 | End: 2024-06-06

## 2024-06-06 RX ORDER — ONDANSETRON HYDROCHLORIDE 2 MG/ML
4 INJECTION, SOLUTION INTRAVENOUS DAILY PRN
Status: DISCONTINUED | OUTPATIENT
Start: 2024-06-06 | End: 2024-06-07 | Stop reason: HOSPADM

## 2024-06-06 RX ORDER — ONDANSETRON HYDROCHLORIDE 2 MG/ML
INJECTION, SOLUTION INTRAVENOUS
Status: DISCONTINUED | OUTPATIENT
Start: 2024-06-06 | End: 2024-06-06

## 2024-06-06 RX ORDER — LIDOCAINE AND PRILOCAINE 25; 25 MG/G; MG/G
CREAM TOPICAL ONCE
Status: COMPLETED | OUTPATIENT
Start: 2024-06-06 | End: 2024-06-06

## 2024-06-06 RX ORDER — SODIUM CHLORIDE 9 MG/ML
INJECTION, SOLUTION INTRAVENOUS CONTINUOUS
Status: DISCONTINUED | OUTPATIENT
Start: 2024-06-06 | End: 2024-06-07 | Stop reason: HOSPADM

## 2024-06-06 RX ORDER — IODIXANOL 320 MG/ML
300 INJECTION, SOLUTION INTRAVASCULAR
Status: COMPLETED | OUTPATIENT
Start: 2024-06-06 | End: 2024-06-06

## 2024-06-06 RX ORDER — FENTANYL CITRATE 50 UG/ML
INJECTION, SOLUTION INTRAMUSCULAR; INTRAVENOUS
Status: DISCONTINUED | OUTPATIENT
Start: 2024-06-06 | End: 2024-06-06

## 2024-06-06 RX ORDER — LIDOCAINE HYDROCHLORIDE 20 MG/ML
INJECTION, SOLUTION EPIDURAL; INFILTRATION; INTRACAUDAL; PERINEURAL
Status: DISCONTINUED | OUTPATIENT
Start: 2024-06-06 | End: 2024-06-06

## 2024-06-06 RX ORDER — MIDAZOLAM HYDROCHLORIDE 1 MG/ML
INJECTION, SOLUTION INTRAMUSCULAR; INTRAVENOUS
Status: DISCONTINUED | OUTPATIENT
Start: 2024-06-06 | End: 2024-06-06

## 2024-06-06 RX ORDER — SODIUM CHLORIDE 0.9 % (FLUSH) 0.9 %
10 SYRINGE (ML) INJECTION
Status: DISCONTINUED | OUTPATIENT
Start: 2024-06-06 | End: 2024-06-07 | Stop reason: HOSPADM

## 2024-06-06 RX ORDER — VERAPAMIL HYDROCHLORIDE 2.5 MG/ML
INJECTION, SOLUTION INTRAVENOUS
Status: COMPLETED | OUTPATIENT
Start: 2024-06-06 | End: 2024-06-06

## 2024-06-06 RX ORDER — HEPARIN SODIUM 1000 [USP'U]/ML
INJECTION, SOLUTION INTRAVENOUS; SUBCUTANEOUS
Status: COMPLETED | OUTPATIENT
Start: 2024-06-06 | End: 2024-06-06

## 2024-06-06 RX ADMIN — HEPARIN SODIUM 2000 UNITS: 1000 INJECTION, SOLUTION INTRAVENOUS; SUBCUTANEOUS at 08:06

## 2024-06-06 RX ADMIN — IODIXANOL 220 ML: 320 INJECTION, SOLUTION INTRAVASCULAR at 10:06

## 2024-06-06 RX ADMIN — FENTANYL CITRATE 25 MCG: 50 INJECTION, SOLUTION INTRAMUSCULAR; INTRAVENOUS at 09:06

## 2024-06-06 RX ADMIN — PROPOFOL 200 MG: 10 INJECTION, EMULSION INTRAVENOUS at 08:06

## 2024-06-06 RX ADMIN — ROCURONIUM BROMIDE 80 MG: 10 INJECTION INTRAVENOUS at 08:06

## 2024-06-06 RX ADMIN — LIDOCAINE HYDROCHLORIDE 100 MG: 20 INJECTION, SOLUTION EPIDURAL; INFILTRATION; INTRACAUDAL; PERINEURAL at 08:06

## 2024-06-06 RX ADMIN — DEXAMETHASONE SODIUM PHOSPHATE 4 MG: 4 INJECTION, SOLUTION INTRAMUSCULAR; INTRAVENOUS at 10:06

## 2024-06-06 RX ADMIN — PROPOFOL 30 MG: 10 INJECTION, EMULSION INTRAVENOUS at 09:06

## 2024-06-06 RX ADMIN — LIDOCAINE AND PRILOCAINE: 25; 25 CREAM TOPICAL at 07:06

## 2024-06-06 RX ADMIN — VERAPAMIL HYDROCHLORIDE 10 MG: 2.5 INJECTION, SOLUTION INTRAVENOUS at 08:06

## 2024-06-06 RX ADMIN — HEPARIN SODIUM 5000 ML: 1000 INJECTION, SOLUTION INTRAVENOUS; SUBCUTANEOUS at 08:06

## 2024-06-06 RX ADMIN — FENTANYL CITRATE 100 MCG: 50 INJECTION, SOLUTION INTRAMUSCULAR; INTRAVENOUS at 08:06

## 2024-06-06 RX ADMIN — PHENYLEPHRINE HYDROCHLORIDE 0.4 MCG/KG/MIN: 10 INJECTION INTRAVENOUS at 08:06

## 2024-06-06 RX ADMIN — SUGAMMADEX 200 MG: 100 INJECTION, SOLUTION INTRAVENOUS at 10:06

## 2024-06-06 RX ADMIN — SODIUM CHLORIDE: 0.9 INJECTION, SOLUTION INTRAVENOUS at 07:06

## 2024-06-06 RX ADMIN — ONDANSETRON 4 MG: 2 INJECTION INTRAMUSCULAR; INTRAVENOUS at 10:06

## 2024-06-06 RX ADMIN — MIDAZOLAM HYDROCHLORIDE 2 MG: 2 INJECTION, SOLUTION INTRAMUSCULAR; INTRAVENOUS at 07:06

## 2024-06-06 RX ADMIN — LIDOCAINE HYDROCHLORIDE 5 ML: 10 INJECTION, SOLUTION EPIDURAL; INFILTRATION; INTRACAUDAL; PERINEURAL at 08:06

## 2024-06-06 NOTE — ANESTHESIA PROCEDURE NOTES
Intubation    Date/Time: 6/6/2024 8:10 AM    Performed by: Carlotta Palomino CRNA  Authorized by: Vira Forman MD    Intubation:     Induction:  Intravenous    Intubated:  Postinduction    Mask Ventilation:  Easy with oral airway    Attempts:  1    Attempted By:  CRNA    Method of Intubation:  Video laryngoscopy    Blade:  Hernandez 3    Laryngeal View Grade: Grade I - full view of cords      Difficult Airway Encountered?: No      Complications:  None    Airway Device:  Oral endotracheal tube    Airway Device Size:  7.5    Style/Cuff Inflation:  Cuffed (inflated to minimal occlusive pressure)    Tube secured:  22    Secured at:  The lips    Placement Verified By:  Capnometry    Complicating Factors:  None    Findings Post-Intubation:  BS equal bilateral and atraumatic/condition of teeth unchanged

## 2024-06-06 NOTE — ANESTHESIA POSTPROCEDURE EVALUATION
Anesthesia Post Evaluation    Patient: Ca Coats    Procedure(s) Performed: * No procedures listed *    Final Anesthesia Type: general      Patient location during evaluation: PACU  Patient participation: Yes- Able to Participate  Level of consciousness: awake and alert  Post-procedure vital signs: reviewed and stable  Pain management: adequate  Airway patency: patent    PONV status at discharge: No PONV  Anesthetic complications: no      Cardiovascular status: blood pressure returned to baseline  Respiratory status: unassisted, room air and spontaneous ventilation  Hydration status: euvolemic  Follow-up not needed.              Vitals Value Taken Time   /71 06/06/24 1331   Temp 36.7 °C (98 °F) 06/06/24 1150   Pulse 76 06/06/24 1340   Resp 15 06/06/24 1340   SpO2 100 % 06/06/24 1340   Vitals shown include unfiled device data.      No case tracking events are documented in the log.      Pain/Hector Score: Hector Score: 9 (6/6/2024 12:00 PM)

## 2024-06-06 NOTE — PLAN OF CARE
Bed rest completed, and TR band removed per order protocol. Dressing placed over site. No bleeding or hematoma noted at site. Discharge instructions given and explained to patient and family with verbalization of understanding all instructions. Patients v/s stable, denies n/v and tolerating po, rates pain level tolerable, IV removed, and family at bedside for patient discharge home.

## 2024-06-06 NOTE — PROGRESS NOTES
Orders for patient reflecting patient will be in recovery about 7 hours after procedure. Clarified with Martha CURIEL to take out order for 2 hour site checks post TR band removal.

## 2024-06-06 NOTE — PLAN OF CARE
Pt tolerated cerebral angiogram well. VSS. Dr. Thomas used a TR band for radial closure. Used 12 mL of air to obtain hemostasis. Maintains good waveform on pulse oximetry that is applied to the 2nd finger on the right hand. 2000 units of Heparin were used during the case. May deflate the TR band in 90 minutes in the recovery area.

## 2024-06-06 NOTE — PROCEDURES
Interventional Neuroradiology Post-Procedure Note    Pre Op Diagnosis: Multiple intracranial aneurysms    Post Op Diagnosis: Same    Procedure: Diagnostic cerebral angiogram    Procedure performed by: Buck CURIEL, Sejal; Martha CURIEL, Gonzalo; Kody CURIEL, Taisha    Written Informed Consent Obtained: Yes    Specimen Removed: NO    Estimated Blood Loss: Minimal    Procedure report:   A 5F slender sheath was placed into the right radial artery and a 5F Chong 2 catheter was advanced over 0.035 glidewire into the aortic arch.  The right vertebral artery,left vertebral arteries, right ICA and left ICA were subselected and angiography of the brain was performed after injection into each of these vessels. Also, 3D spin was performed with reconstruction at separate workstation to obtain more details and further characterize the aneurysm.     Preliminary interpretation: 3 aneurysms (Basilar [place], Left P.Comm Segment ICA, Left MCA Bifurcation) were revealed in this angiogram.  Please see Imaging report for full details.    A right radial artery angiogram was performed, the sheath removed and hemostasis achieved using transradial bend with closing pressure of 12cc.  No hematoma was present at the time of hemostasis.    The patient tolerated the procedure well.     Plan:  -To recovery for 2h  -Avoid checking blood pressure in right arm   -Avoid carrying heavy weights with right arm > 10 lbs x 24 hrs                       Gonzalo Thomas MD, MHA  Fellow, NeuroEndovascular Surgery, [facility] Dario Glover  Neurologist, [facility] [place], [place]

## 2024-06-06 NOTE — H&P
Interventional Neuroradiology Pre-procedure Note    Procedure: Diagnostic cerebral angiogram    History of Present Illness:  Ca Coats is a 73 y.o. female with past medical history of hypertension, obstructive sleep apnea on CPAP, suggestive heart failure, obesity, history of gastric cancer status post chemoradiation, DVT in currently on Eliquis and current smoking who presents for diagnostic cerebral angiogram to further characterize the 3 intracranial aneurysms (L P. Comm Segment ICA, Left M2 and Basilar [place]) .    ROS:   Hematological: no known coagulopathies  Respiratory: no shortness of breath  Cardiovascular: no chest pain  Gastrointestinal: no abdominal pain  Genito-Urinary: no dysuria  Musculoskeletal: negative  Neurological: no TIA or stroke symptoms     Scheduled Meds:    LIDOcaine (PF) 10 mg/ml (1%)  1 mL Other Once    LIDOcaine-prilocaine   Topical (Top) Once     Current Meds:   Current Outpatient Medications:     acetaminophen (TYLENOL) 500 MG tablet, Take by mouth daily as needed., Disp: , Rfl:     amLODIPine (NORVASC) 10 MG tablet, Take 1 tablet (10 mg total) by mouth once daily. (Patient taking differently: Take 10 mg by mouth every morning.), Disp: 90 tablet, Rfl: 0    atorvastatin (LIPITOR) 20 MG tablet, TAKE 1 TABLET EVERY DAY, Disp: 90 tablet, Rfl: 0    B-complex with vitamin C (Z-BEC OR EQUIV) tablet, Take 1 tablet by mouth once daily. , Disp: , Rfl:     CALCIUM CITRATE-VITAMIN D2 ORAL, Take 1 tablet by mouth once daily. , Disp: , Rfl:     citalopram (CELEXA) 10 MG tablet, TAKE 1 TABLET EVERY DAY, Disp: 90 tablet, Rfl: 0    dextran 70-hypromellose (TEARS) ophthalmic solution, Place 1 drop into the right eye every 6 (six) hours., Disp: 30 mL, Rfl: 0    ELIQUIS 5 mg Tab, TAKE 1 TABLET TWICE DAILY, Disp: 180 tablet, Rfl: 2    ferrous gluconate (FERGON) 324 MG tablet, TAKE 1 TABLET (324 MG TOTAL) BY MOUTH DAILY WITH BREAKFAST., Disp: 90 tablet, Rfl: 3    furosemide (LASIX) 80 MG  tablet, TAKE 1 TABLET TWICE DAILY, Disp: 180 tablet, Rfl: 1    gabapentin (NEURONTIN) 300 MG capsule, Take 2 capsules (600 mg total) by mouth 3 (three) times daily., Disp: 540 capsule, Rfl: 2    LIDOcaine-prilocaine (EMLA) cream, Apply topically as needed (prior to port access)., Disp: 30 g, Rfl: 0    lisinopriL (PRINIVIL,ZESTRIL) 40 MG tablet, Take 1 tablet (40 mg total) by mouth once daily. (Patient taking differently: Take 40 mg by mouth every morning.), Disp: 90 tablet, Rfl: 0    multivitamin with minerals tablet, Take 1 tablet by mouth once daily. , Disp: , Rfl:     oxyCODONE-acetaminophen (PERCOCET) 5-325 mg per tablet, Take 1 tablet by mouth every 8 (eight) hours as needed for Pain., Disp: 90 tablet, Rfl: 0    oxyCODONE-acetaminophen (PERCOCET) 5-325 mg per tablet, Take 1 tablet by mouth every 8 (eight) hours as needed for Pain., Disp: 90 tablet, Rfl: 0    pantoprazole (PROTONIX) 40 MG tablet, TAKE 1 TABLET EVERY DAY, Disp: 90 tablet, Rfl: 1    semaglutide (OZEMPIC) 1 mg/dose (4 mg/3 mL), INJECT 1 MG INTO THE SKIN EVERY 7 DAYS., Disp: 9 each, Rfl: 11    Current Facility-Administered Medications:     0.9%  NaCl infusion, , Intravenous, Continuous, Meghana Craig PA-C    LIDOcaine (PF) 10 mg/ml (1%) injection 10 mg, 1 mL, Other, Once, Meghana Craig PA-C    LIDOcaine-prilocaine cream, , Topical (Top), Once, Gonzalo Thomas MD    Facility-Administered Medications Ordered in Other Encounters:     0.9%  NaCl infusion, , Intravenous, Continuous, Tevin Clark MD, Last Rate: 0 mL/hr at 01/13/22 1055, New Bag at 03/05/24 1000    0.9%  NaCl infusion, , Intravenous, Continuous, Kevin Carballo MD    sodium chloride 0.9% flush 10 mL, 10 mL, Intravenous, PRN, Kevin Carballo MD   Continuous Infusions:    sodium chloride 0.9%   Intravenous Continuous         PRN Meds:    Allergies: Review of patient's allergies indicates:  No Known Allergies  Sedation Hx: No adverse events.    Labs:               Objective:  Vitals: There were no vitals taken for this visit.     Physical Exam:  General: well developed, well nourished, no distress.   Head: normocephalic, atraumatic  Neck: No tracheal deviation. No palpable masses. Full ROM.   Neurologic: Alert and oriented. Thought content appropriate.  GCS: E4 V5 M6; Total: 15  Language: No aphasia  Speech: No dysarthria  Cranial nerves: face symmetric, tongue midline, CN II-XII grossly intact.   Eyes: pupils equal, round, reactive to light with accomodation, EOMI.   Pulmonary: normal respirations, no signs of respiratory distress  Abdomen: soft, non-distended, not tender to palpation  Vascular: Pulses 2+ and symmetric radial and dorsalis pedis. No LE edema.   Skin: Skin is warm, dry and intact.  Sensory: intact to light touch throughout  Motor Strength: Moves all extremities spontaneously with good tone.  Full strength upper and lower extremities. No abnormal movements seen.     ASA: 2  MAL: 2    Plan:  -Plan for cerebral angiogram   -Sedation Plan: General anesthesia  -All diagnostics and imaging reviewed  -Patient NPO since MN  -Risks & benefits of procedure explained in detail; patient consented and all questions answered  -Further reccs to follow procedure          Gonzalo Thomas MD, MHA  Fellow, NeuroEndovascular Surgery, [facility] [place]  Neurologist, [facility] [place], [place]

## 2024-06-06 NOTE — ANESTHESIA PREPROCEDURE EVALUATION
06/06/2024  Ca Coats is a 73 y.o., female.    Past Medical History:   Diagnosis Date    YOUNG (acute kidney injury) 10/15/2021    Anemia     2018    Arthritis     BMI 60.0-69.9, adult     Cataract     Chronic diastolic congestive heart failure 01/27/2021    Deep vein thrombosis     Depression     Dry eye syndrome     DVT of deep femoral vein, right 05/29/2023    Gastric adenocarcinoma 05/25/2021    GERD (gastroesophageal reflux disease)     Glaucoma suspect     History of gastric ulcer     History of psychiatric hospitalization     Hx of psychiatric care     Celexa, no recall of charted Effexor, Lexapro, Desyrel prescriptions    Hyperlipidemia     Hypertension     Hypothyroidism     Morbid obesity     Neuromuscular disorder     Sleep apnea     Thyroid disease        Past Surgical History:   Procedure Laterality Date    APPENDECTOMY      CARDIAC SURGERY      CATARACT EXTRACTION W/  INTRAOCULAR LENS IMPLANT Bilateral     MFIOL OU    CORONARY ANGIOGRAPHY Bilateral 02/24/2021    Procedure: ANGIOGRAM, CORONARY ARTERY;  Surgeon: Cresencio Ridley MD;  Location: [facility] CATH LAB;  Service: Cardiology;  Laterality: Bilateral;  Covid test needed - ok per Danay SSCU. Pt. w/o transportation or family    ENDOSCOPIC ULTRASOUND OF UPPER GASTROINTESTINAL TRACT N/A 03/17/2021    Procedure: ULTRASOUND, UPPER GI TRACT, ENDOSCOPIC;  Surgeon: Gerald Anaya MD;  Location: [facility] ([address] [place] [address]);  Service: Endoscopy;  Laterality: N/A;  Pt unable to get a ride for swab. Will do rapid test at 8:30 - ttr    ENDOSCOPIC ULTRASOUND OF UPPER GASTROINTESTINAL TRACT N/A 01/13/2022    Procedure: ULTRASOUND, UPPER GI TRACT, ENDOSCOPIC;  Surgeon: Kevin Carballo MD;  Location: [facility] ([address] [place] [address]);  Service: Endoscopy;  Laterality: N/A;  blood thinner approval received, see telephone encounter 1/7/22-BB  rapid  instructions  given verbally and sent to address on file in pt's chart-BB    ENDOSCOPIC ULTRASOUND OF UPPER GASTROINTESTINAL TRACT N/A 10/11/2022    Procedure: ULTRASOUND, UPPER GI TRACT, ENDOSCOPIC;  Surgeon: Dequan Ridley MD;  Location: [facility] (2ND FLR);  Service: Endoscopy;  Laterality: N/A;    ENDOSCOPIC ULTRASOUND OF UPPER GASTROINTESTINAL TRACT N/A 1/24/2024    Procedure: ULTRASOUND, UPPER GI TRACT, ENDOSCOPIC;  Surgeon: Kevin Carballo MD;  Location: [facility] (2ND FLR);  Service: Endoscopy;  Laterality: N/A;    ENDOSCOPIC ULTRASOUND OF UPPER GASTROINTESTINAL TRACT N/A 3/5/2024    Procedure: ULTRASOUND, UPPER GI TRACT, ENDOSCOPIC;  Surgeon: Kevin Carballo MD;  Location: [facility] (2ND FLR);  Service: Endoscopy;  Laterality: N/A;  1/25 portal instr-Repeat theEUS at the next available appointment because the preparation was poor. 48hrs of liquid. Ozempic 7 day hold-eliquis approved Dr. Pelaez TE 12/12/2023-tt  2/28-precall complete-pt verbalized udnerstanding of holding Oz    ESOPHAGOGASTRODUODENOSCOPY N/A 02/05/2021    Procedure: EGD (ESOPHAGOGASTRODUODENOSCOPY);  Surgeon: Matthew Hernadez MD;  Location: [facility] (2ND FLR);  Service: Endoscopy;  Laterality: N/A;  BMI-58    Wt: 361#     COVID test at [facility] on 2/2-GT    ESOPHAGOGASTRODUODENOSCOPY N/A 03/17/2021    Procedure: EGD (ESOPHAGOGASTRODUODENOSCOPY);  Surgeon: Gerald Anaya MD;  Location: [facility] (2ND FLR);  Service: Endoscopy;  Laterality: N/A;    ESOPHAGOGASTRODUODENOSCOPY N/A 05/25/2021    Procedure: EGD (ESOPHAGOGASTRODUODENOSCOPY);  Surgeon: Kevin Carballo MD;  Location: [facility] (2ND FLR);  Service: Endoscopy;  Laterality: N/A;  ESD 2 hours  Full COVID vaccination on file. ttr    ESOPHAGOGASTRODUODENOSCOPY N/A 01/13/2022    Procedure: EGD (ESOPHAGOGASTRODUODENOSCOPY);  Surgeon: Kevin Carballo MD;  Location: [facility] ([address] [place] [address]);  Service: Endoscopy;  Laterality: N/A;  blood thinner approval, see telephone encounter  1/7/22-BB  rapid  instructions given verbally and sent to address on file in pt's chart-BB    ESOPHAGOGASTRODUODENOSCOPY N/A 10/11/2022    Procedure: EGD (ESOPHAGOGASTRODUODENOSCOPY);  Surgeon: Dequan Ridley MD;  Location: [facility] ENDO (2ND FLR);  Service: Endoscopy;  Laterality: N/A;  She is do for EGD/EUS now. Personal history of gastric cancer. Ca Coats #[id number].   Thanks,   Kevin Fuentes MD    Pt is fully vaccinated-DS  9/14/22-Approval to hold Eliquis rec'd from Dr. ePlaez (see telephone encounter 9/14/22)-DS  9/14/22-Ins    ESOPHAGOGASTRODUODENOSCOPY N/A 1/24/2024    Procedure: EGD (ESOPHAGOGASTRODUODENOSCOPY);  Surgeon: Kevin Carballo MD;  Location: [facility] (2ND FLR);  Service: Endoscopy;  Laterality: N/A;  12/8/23: instructions sent via portal. eliquis approval from MD Pelaez in telephone encounter (12/12/23) -GD  1/9-precall complete-MS    ESOPHAGOGASTRODUODENOSCOPY N/A 3/5/2024    Procedure: EGD (ESOPHAGOGASTRODUODENOSCOPY);  Surgeon: Kevin Carballo MD;  Location: [facility] ENDO (2ND FLR);  Service: Endoscopy;  Laterality: N/A;    EYE SURGERY      HYSTEROSCOPIC POLYPECTOMY OF UTERUS  1/10/2024    Procedure: POLYPECTOMY, UTERUS, HYSTEROSCOPIC;  Surgeon: Pina Pickens MD;  Location: [facility] OR;  Service: OB/GYN;;    HYSTEROSCOPY WITH DILATION AND CURETTAGE OF UTERUS N/A 1/10/2024    Procedure: HYSTEROSCOPY, WITH DILATION AND CURETTAGE OF UTERUS;  Surgeon: Pina Pickens MD;  Location: [facility] OR;  Service: OB/GYN;  Laterality: N/A;    INJECTION OF ANESTHETIC AGENT AROUND NERVE Left 07/10/2018    Procedure: BLOCK, NERVE;  Surgeon: Samantha Vidal MD;  Location: [facility] PAIN MGT;  Service: Pain Management;  Laterality: Left;  Genicular     INJECTION OF ANESTHETIC AGENT AROUND NERVE Left 07/19/2019    Procedure: Block, Nerve NERVE BLOCK GENICULAR WITH PHENOL 6%;  Surgeon: Samantha Vidal MD;  [facility];  Service: Pain Management;  Laterality: Left;  NEEDS CONSENT    INSERTION OF  TUNNELED CENTRAL VENOUS CATHETER (CVC) WITH SUBCUTANEOUS PORT N/A 07/09/2021    Procedure: INSERTION, PORT-A-CATH;  Surgeon: Mario Paz MD;  Location: [facility] CATH LAB;  Service: Radiology;  Laterality: N/A;  PORT PLACEMENT    RADIOFREQUENCY ABLATION Left 06/19/2020    Procedure: RADIOFREQUENCY ABLATION LEFT GENICULAR;  Surgeon: Tevin Clark MD;  Location: [facility] PAIN MGT;  Service: Pain Management;  Laterality: Left;  Left Genicular RFA    RADIOFREQUENCY ABLATION Left 01/22/2021    Procedure: RADIOFREQUENCY ABLATION LEFT GENICULAR;  Surgeon: Tevin Clark MD;  Location: [facility] PAIN MGT;  Service: Pain Management;  Laterality: Left;    RADIOFREQUENCY ABLATION Left 07/30/2021    Procedure: RADIOFREQUENCY ABLATION, GENICULAR COOLED NEED CONSENT;  Surgeon: Tevin Clark MD;  Location: [facility] PAIN MGT;  Service: Pain Management;  Laterality: Left;    RADIOFREQUENCY ABLATION Left 04/22/2022    Procedure: RADIOFREQUENCY ABLATION, GENICULAR COOLED , LEFT;  Surgeon: Tevin Clark MD;  Location: [facility] PAIN MGT;  Service: Pain Management;  Laterality: Left;    RADIOFREQUENCY ABLATION Left 12/23/2022    Procedure: RADIOFREQUENCY ABLATION LEFT GENICULAR COOLED CLEARED TO HOLD ELIQUIS 3 DAYS  NEEDS CONSENT;  Surgeon: Tevin Clark MD;  Location: [facility] PAIN MGT;  Service: Pain Management;  Laterality: Left;    TONSILLECTOMY             Pre-op Assessment          Review of Systems  Anesthesia Hx:  No problems with previous Anesthesia   History of prior surgery of interest to airway management or planning:          Denies Family Hx of Anesthesia complications.    Denies Personal Hx of Anesthesia complications.                    Cardiovascular:     Hypertension       Denies Angina.         On eliquis, took this morning                         Pulmonary:    Denies COPD.     Denies Recent URI. Sleep Apnea                Hepatic/GI:      Denies GERD.   On ozempic but last dose over a week ago.  Does not cause her any GI  symptoms.           Neurological:           Aneurysms  C/o dizziness, headache, visual changes  No syncope but may have to rest for symptoms to resolve                                Physical Exam  General: Alert, Oriented and Well nourished    Airway:  Mallampati: II   Mouth Opening: Normal  TM Distance: Normal  Neck ROM: Normal ROM    Dental:  Dentures    Chest/Lungs:  Normal Respiratory Rate    Heart:  Rate: Normal  Rhythm: Regular Rhythm        Anesthesia Plan  Type of Anesthesia, risks & benefits discussed:    Anesthesia Type: Gen ETT, MAC  Intra-op Monitoring Plan: Standard ASA Monitors  Post Op Pain Control Plan: multimodal analgesia and IV/PO Opioids PRN  Informed Consent: Informed consent signed with the Patient and all parties understand the risks and agree with anesthesia plan.  All questions answered.   ASA Score: 3  Day of Surgery Review of History & Physical: H&P Update referred to the surgeon/provider.    Ready For Surgery From Anesthesia Perspective.     .

## 2024-06-06 NOTE — PLAN OF CARE
Pt arrived to 200 (4)  for diagnostic cerebral angiogram +/- intervention. Pt oriented to unit and staff. Plan of care reviewed with patient, patient verbalizes understanding. Comfort measures utilized. Pt safely transferred from stretcher to procedural table. Fall risk reviewed with patient, fall risk interventions maintained. Positioner pillows utilized to minimize pressure points. Blankets applied. Pt prepped and draped utilizing standard sterile technique. Timeouts completed utilizing standard universal time-out, per department and facility policy.  Anesthesia at bedside; Refer to anesthesia record regarding sedation and vital signs.

## 2024-06-06 NOTE — TRANSFER OF CARE
"Anesthesia Transfer of Care Note    Patient: Ca Coats    Procedure(s) Performed: * No procedures listed *    Patient location: PACU    Anesthesia Type: general    Transport from OR: Transported from OR on 6-10 L/min O2 by face mask with adequate spontaneous ventilation    Post pain: adequate analgesia    Post assessment: no apparent anesthetic complications    Post vital signs: stable    Level of consciousness: awake and alert    Nausea/Vomiting: no nausea/vomiting    Complications: none    Transfer of care protocol was followed      Last vitals: Visit Vitals  /61 (BP Location: Left arm, Patient Position: Lying)   Pulse 82   Temp 36.7 °C (98.1 °F) (Temporal)   Resp 18   Ht 5' 6" (1.676 m)   Wt 108.9 kg (240 lb)   Breastfeeding No   BMI 38.74 kg/m²     "

## 2024-06-06 NOTE — DISCHARGE INSTRUCTIONS
Please call with any questions or concerns.      Monday through Friday 8:00 am - 5:00 pm    Interventional Radiology Clinic  [phone]    After Hours    Ask the  for the Radiology Resident on call  [phone]    The following are instructions your doctor would like you to follow regarding your activity, diet, and follow up care.    Your procedure was done by making a small puncture in your wrist. You must observe that area for bleeding and swelling once you are discharged from the hospital. Be careful not to over-stimulate the affected wrist for 24 hours.    Do not strenuously flex the wrist for 24 hours.  Occlusive bandage (Tegaderm) may be removed after 24 hours.  At 24 hours after your procedure, remove the Tagaderm bandage and leave puncture site open to air. If minor oozing, apply adhesive bandage (Band-Aid) and remove after 12 hours.  No driving for 24 hours.  No soaking wrist for 2 days. Gently wash site with soap and water. Dry well. Do not apply powders, lotions or ointments. No tub baths, whirlpool or swimming for 48 hours.  No lifting more than 3-5 pounds with affected wrist for 7 days.  Restart your usual diet and medications with appropriate changes as dictated by your doctor.  For any bleeding, hold pressure with thumb against puncture site and finger against back of wrist.  If you see fresh bleeding, bright red or a lump that is getting bigger at the puncture site, apply pressure to the area as instructed above and call [phone] between the hours of 8:00am-5:00pm or [phone] after hours and ask to speak to the Interventional Radiology Resident on-call. If you are unable to control the bleeding, call [phone].   Call the Interventional Radiology Resident on-call at [phone] with any concerns or any of the following:  Swelling, redness, discharge for the site, large bruising or increasing pain, numbness or tingling at the site  Temperature of 101 F or higher  Shortness of breath

## 2024-06-10 ENCOUNTER — PATIENT MESSAGE (OUTPATIENT)
Dept: INTERNAL MEDICINE | Facility: CLINIC | Age: 74
End: 2024-06-10
Payer: MEDICARE

## 2024-06-11 ENCOUNTER — TELEPHONE (OUTPATIENT)
Dept: HEMATOLOGY/ONCOLOGY | Facility: CLINIC | Age: 74
End: 2024-06-11
Payer: MEDICARE

## 2024-06-13 ENCOUNTER — OFFICE VISIT (OUTPATIENT)
Dept: PULMONOLOGY | Facility: CLINIC | Age: 74
End: 2024-06-13
Payer: MEDICARE

## 2024-06-13 ENCOUNTER — CLINICAL SUPPORT (OUTPATIENT)
Dept: SMOKING CESSATION | Facility: CLINIC | Age: 74
End: 2024-06-13
Payer: COMMERCIAL

## 2024-06-13 VITALS
OXYGEN SATURATION: 96 % | SYSTOLIC BLOOD PRESSURE: 128 MMHG | WEIGHT: 240.06 LBS | HEIGHT: 66 IN | DIASTOLIC BLOOD PRESSURE: 76 MMHG | BODY MASS INDEX: 38.58 KG/M2 | HEART RATE: 70 BPM

## 2024-06-13 DIAGNOSIS — Z72.0 TOBACCO ABUSE: Chronic | ICD-10-CM

## 2024-06-13 DIAGNOSIS — C16.9 GASTRIC ADENOCARCINOMA: ICD-10-CM

## 2024-06-13 DIAGNOSIS — R91.8 PULMONARY NODULES/LESIONS, MULTIPLE: Primary | ICD-10-CM

## 2024-06-13 DIAGNOSIS — F17.200 NICOTINE DEPENDENCE: Primary | ICD-10-CM

## 2024-06-13 DIAGNOSIS — R91.1 SOLITARY PULMONARY NODULE: ICD-10-CM

## 2024-06-13 DIAGNOSIS — R91.8 PULMONARY NODULES: ICD-10-CM

## 2024-06-13 PROCEDURE — 3074F SYST BP LT 130 MM HG: CPT | Mod: HCNC,CPTII,S$GLB, | Performed by: INTERNAL MEDICINE

## 2024-06-13 PROCEDURE — 3044F HG A1C LEVEL LT 7.0%: CPT | Mod: HCNC,CPTII,S$GLB, | Performed by: INTERNAL MEDICINE

## 2024-06-13 PROCEDURE — 99999 PR PBB SHADOW E&M-EST. PATIENT-LVL I: CPT | Mod: PBBFAC,,,

## 2024-06-13 PROCEDURE — 3078F DIAST BP <80 MM HG: CPT | Mod: HCNC,CPTII,S$GLB, | Performed by: INTERNAL MEDICINE

## 2024-06-13 PROCEDURE — 4010F ACE/ARB THERAPY RXD/TAKEN: CPT | Mod: HCNC,CPTII,S$GLB, | Performed by: INTERNAL MEDICINE

## 2024-06-13 PROCEDURE — 99999 PR PBB SHADOW E&M-EST. PATIENT-LVL V: CPT | Mod: PBBFAC,HCNC,, | Performed by: INTERNAL MEDICINE

## 2024-06-13 PROCEDURE — 1101F PT FALLS ASSESS-DOCD LE1/YR: CPT | Mod: HCNC,CPTII,S$GLB, | Performed by: INTERNAL MEDICINE

## 2024-06-13 PROCEDURE — 1126F AMNT PAIN NOTED NONE PRSNT: CPT | Mod: HCNC,CPTII,S$GLB, | Performed by: INTERNAL MEDICINE

## 2024-06-13 PROCEDURE — 99204 OFFICE O/P NEW MOD 45 MIN: CPT | Mod: HCNC,S$GLB,, | Performed by: INTERNAL MEDICINE

## 2024-06-13 PROCEDURE — 3008F BODY MASS INDEX DOCD: CPT | Mod: HCNC,CPTII,S$GLB, | Performed by: INTERNAL MEDICINE

## 2024-06-13 PROCEDURE — 3288F FALL RISK ASSESSMENT DOCD: CPT | Mod: HCNC,CPTII,S$GLB, | Performed by: INTERNAL MEDICINE

## 2024-06-13 PROCEDURE — 1159F MED LIST DOCD IN RCRD: CPT | Mod: HCNC,CPTII,S$GLB, | Performed by: INTERNAL MEDICINE

## 2024-06-13 PROCEDURE — 99407 BEHAV CHNG SMOKING > 10 MIN: CPT | Mod: S$GLB,,, | Performed by: GENERAL PRACTICE

## 2024-06-13 NOTE — PROGRESS NOTES
"Subjective:       Patient ID: Ca Coats is a 73 y.o. female.    Chief Complaint: Pulmonary Nodules    72 yo current smoker who reduced her smoking from 1ppd to 2-3 cigs/day in 2018.  Referred her by Dr Motley for pulmonary nodules.  Has a history of gastric  cancer incidentally found as part of bariatric evaluation status post tx.  Also with h/o DVT, on eliquis since having chemotherapy.  Denies having any infectious symptoms.  No chronic cough or dyspnea.  Never been told or treated for COPD.    Originally from [place] and [place] the [place] for [employer] and her last stop was in [place] and she stayed since 1985.            Exercise is limited by chronic knee pain.  Was to have a TKR and found to have aneurysm on MRI of brain.    Review of Systems   Constitutional:  Negative for weight loss and weight gain.   HENT:  Negative for trouble swallowing.    Respiratory:  Negative for cough, sputum production and dyspnea on extertion.    Gastrointestinal:  Negative for acid reflux.   Neurological:  Negative for headaches.   Psychiatric/Behavioral:  Negative for confusion.        Objective:      Vitals:    06/13/24 1000   BP: 128/76   BP Location: Left forearm   Patient Position: Sitting   Pulse: 70   SpO2: 96%   Weight: 108.9 kg (240 lb 1.3 oz)   Height: 5' 6" (1.676 m)      Physical Exam   Constitutional: She is oriented to person, place, and time. She appears well-developed and well-nourished.   HENT:   Head: Normocephalic.   Cardiovascular: Normal rate and regular rhythm.   Pulmonary/Chest: Normal expansion. She has no wheezes. She has no rales.   Musculoskeletal:         General: No edema. Normal range of motion.   Lymphadenopathy: No supraclavicular adenopathy is present.     She has no cervical adenopathy.   Neurological: She is alert and oriented to person, place, and time.   Skin: Skin is warm and dry.   Psychiatric: She has a normal mood and affect.     Personal " "Diagnostic Review        6/13/2024    10:00 AM 6/6/2024     2:15 PM 6/6/2024     2:00 PM 6/6/2024     1:45 PM 6/6/2024     1:30 PM 6/6/2024     1:15 PM 6/6/2024     1:00 PM   Pulmonary Function Tests   SpO2 96 % 95 % 95 % 100 % 100 % 99 % 97 %   Height 5' 6" (1.676 m)         Weight 108.9 kg (240 lb 1.3 oz)         BMI (Calculated) 38.8               Assessment:       1. Pulmonary nodules/lesions, multiple    2. Pulmonary nodules    3. Solitary pulmonary nodule    4. Gastric adenocarcinoma    5. Tobacco abuse        Outpatient Encounter Medications as of 6/13/2024   Medication Sig Dispense Refill    acetaminophen (TYLENOL) 500 MG tablet Take by mouth daily as needed.      amLODIPine (NORVASC) 10 MG tablet Take 1 tablet (10 mg total) by mouth once daily. (Patient taking differently: Take 10 mg by mouth every morning.) 90 tablet 0    atorvastatin (LIPITOR) 20 MG tablet TAKE 1 TABLET EVERY DAY 90 tablet 0    B-complex with vitamin C (Z-BEC OR EQUIV) tablet Take 1 tablet by mouth once daily.       CALCIUM CITRATE-VITAMIN D2 ORAL Take 1 tablet by mouth once daily.       citalopram (CELEXA) 10 MG tablet TAKE 1 TABLET EVERY DAY 90 tablet 0    dextran 70-hypromellose (TEARS) ophthalmic solution Place 1 drop into the right eye every 6 (six) hours. 30 mL 0    ELIQUIS 5 mg Tab TAKE 1 TABLET TWICE DAILY 180 tablet 2    ferrous gluconate (FERGON) 324 MG tablet TAKE 1 TABLET (324 MG TOTAL) BY MOUTH DAILY WITH BREAKFAST. 90 tablet 3    furosemide (LASIX) 80 MG tablet TAKE 1 TABLET TWICE DAILY 180 tablet 1    gabapentin (NEURONTIN) 300 MG capsule Take 2 capsules (600 mg total) by mouth 3 (three) times daily. 540 capsule 2    LIDOcaine-prilocaine (EMLA) cream Apply topically as needed (prior to port access). 30 g 0    lisinopriL (PRINIVIL,ZESTRIL) 40 MG tablet Take 1 tablet (40 mg total) by mouth once daily. (Patient taking differently: Take 40 mg by mouth every morning.) 90 tablet 0    multivitamin with minerals tablet Take 1 " tablet by mouth once daily.       oxyCODONE-acetaminophen (PERCOCET) 5-325 mg per tablet Take 1 tablet by mouth every 8 (eight) hours as needed for Pain. 90 tablet 0    oxyCODONE-acetaminophen (PERCOCET) 5-325 mg per tablet Take 1 tablet by mouth every 8 (eight) hours as needed for Pain. 90 tablet 0    pantoprazole (PROTONIX) 40 MG tablet TAKE 1 TABLET EVERY DAY 90 tablet 1    semaglutide (OZEMPIC) 1 mg/dose (4 mg/3 mL) INJECT 1 MG INTO THE SKIN EVERY 7 DAYS. 9 each 11     Facility-Administered Encounter Medications as of 6/13/2024   Medication Dose Route Frequency Provider Last Rate Last Admin    0.9%  NaCl infusion   Intravenous Continuous Tevin Clark MD 0 mL/hr at 01/13/22 1055 New Bag at 03/05/24 1000    0.9%  NaCl infusion   Intravenous Continuous Kevin Carballo MD        sodium chloride 0.9% flush 10 mL  10 mL Intravenous PRN Kevin Carballo MD        [DISCONTINUED] 0.9%  NaCl infusion   Intravenous Continuous Meghana Craig PA-C        [DISCONTINUED] ondansetron injection 4 mg  4 mg Intravenous Daily PRN Vira Forman MD        [DISCONTINUED] sodium chloride 0.9% flush 10 mL  10 mL Intravenous PRN Gonzalo Thomas MD         Orders Placed This Encounter   Procedures    CT Chest Without Contrast     Standing Status:   Future     Standing Expiration Date:   6/13/2025     Scheduling Instructions:      Chest Navigational Bronchoscopy     Order Specific Question:   May the Radiologist modify the order per protocol to meet the clinical needs of the patient?     Answer:   Yes     Plan:     Problem List Items Addressed This Visit       Tobacco abuse (Chronic)    Current Assessment & Plan     Encouraged to stop completely.            Gastric adenocarcinoma    Overview     gastric cancer cT2 or higher         Pulmonary nodules/lesions, multiple - Primary    Overview     Current smoker.  Bilateral may be inflammatory.  History of gastric cancer as well.  Will follow up in three months with CT  prior, if persistent will perform robotic bronchoscopy         Relevant Orders    CT Chest Without Contrast     Other Visit Diagnoses       Pulmonary nodules        Solitary pulmonary nodule

## 2024-06-13 NOTE — PROGRESS NOTES
Spoke with patient today in regard to smoking cessation progress 12 month telephone follow up, she states that she is tobacco free.  Patient states she quit 6 months ago.  Commended patient on the accomplishments thus far.  Informed patient of benefit period, future follow up, and contact information if any further help or support is needed.  Will complete smart form for 12 month follow up on Quit attempt #4 and resolve episode.

## 2024-06-18 ENCOUNTER — OFFICE VISIT (OUTPATIENT)
Dept: INTERNAL MEDICINE | Facility: CLINIC | Age: 74
End: 2024-06-18
Payer: MEDICARE

## 2024-06-18 VITALS
DIASTOLIC BLOOD PRESSURE: 64 MMHG | HEIGHT: 66 IN | HEART RATE: 73 BPM | BODY MASS INDEX: 42.84 KG/M2 | OXYGEN SATURATION: 96 % | WEIGHT: 266.56 LBS | SYSTOLIC BLOOD PRESSURE: 112 MMHG

## 2024-06-18 DIAGNOSIS — I82.411 DVT OF DEEP FEMORAL VEIN, RIGHT: ICD-10-CM

## 2024-06-18 DIAGNOSIS — K21.9 GASTROESOPHAGEAL REFLUX DISEASE, UNSPECIFIED WHETHER ESOPHAGITIS PRESENT: ICD-10-CM

## 2024-06-18 DIAGNOSIS — F32.0 CURRENT MILD EPISODE OF MAJOR DEPRESSIVE DISORDER WITHOUT PRIOR EPISODE: ICD-10-CM

## 2024-06-18 DIAGNOSIS — E78.2 MIXED HYPERLIPIDEMIA: ICD-10-CM

## 2024-06-18 DIAGNOSIS — I10 ESSENTIAL HYPERTENSION: Primary | ICD-10-CM

## 2024-06-18 DIAGNOSIS — F43.20 ADJUSTMENT DISORDER, UNSPECIFIED TYPE: ICD-10-CM

## 2024-06-18 DIAGNOSIS — I50.32 CHRONIC DIASTOLIC CONGESTIVE HEART FAILURE: ICD-10-CM

## 2024-06-18 DIAGNOSIS — E66.01 MORBID OBESITY WITH BMI OF 45.0-49.9, ADULT: ICD-10-CM

## 2024-06-18 PROBLEM — D84.81 IMMUNODEFICIENCY DUE TO CONDITIONS CLASSIFIED ELSEWHERE: Status: RESOLVED | Noted: 2023-05-29 | Resolved: 2024-06-18

## 2024-06-18 PROCEDURE — 4010F ACE/ARB THERAPY RXD/TAKEN: CPT | Mod: HCNC,CPTII,S$GLB, | Performed by: INTERNAL MEDICINE

## 2024-06-18 PROCEDURE — 1126F AMNT PAIN NOTED NONE PRSNT: CPT | Mod: HCNC,CPTII,S$GLB, | Performed by: INTERNAL MEDICINE

## 2024-06-18 PROCEDURE — 3044F HG A1C LEVEL LT 7.0%: CPT | Mod: HCNC,CPTII,S$GLB, | Performed by: INTERNAL MEDICINE

## 2024-06-18 PROCEDURE — 1159F MED LIST DOCD IN RCRD: CPT | Mod: HCNC,CPTII,S$GLB, | Performed by: INTERNAL MEDICINE

## 2024-06-18 PROCEDURE — 3008F BODY MASS INDEX DOCD: CPT | Mod: HCNC,CPTII,S$GLB, | Performed by: INTERNAL MEDICINE

## 2024-06-18 PROCEDURE — 3078F DIAST BP <80 MM HG: CPT | Mod: HCNC,CPTII,S$GLB, | Performed by: INTERNAL MEDICINE

## 2024-06-18 PROCEDURE — 99999 PR PBB SHADOW E&M-EST. PATIENT-LVL IV: CPT | Mod: PBBFAC,HCNC,, | Performed by: INTERNAL MEDICINE

## 2024-06-18 PROCEDURE — 99214 OFFICE O/P EST MOD 30 MIN: CPT | Mod: HCNC,S$GLB,, | Performed by: INTERNAL MEDICINE

## 2024-06-18 PROCEDURE — 1101F PT FALLS ASSESS-DOCD LE1/YR: CPT | Mod: HCNC,CPTII,S$GLB, | Performed by: INTERNAL MEDICINE

## 2024-06-18 PROCEDURE — 1160F RVW MEDS BY RX/DR IN RCRD: CPT | Mod: HCNC,CPTII,S$GLB, | Performed by: INTERNAL MEDICINE

## 2024-06-18 PROCEDURE — 3288F FALL RISK ASSESSMENT DOCD: CPT | Mod: HCNC,CPTII,S$GLB, | Performed by: INTERNAL MEDICINE

## 2024-06-18 PROCEDURE — G2211 COMPLEX E/M VISIT ADD ON: HCPCS | Mod: HCNC,S$GLB,, | Performed by: INTERNAL MEDICINE

## 2024-06-18 PROCEDURE — 3074F SYST BP LT 130 MM HG: CPT | Mod: HCNC,CPTII,S$GLB, | Performed by: INTERNAL MEDICINE

## 2024-06-18 RX ORDER — SEMAGLUTIDE 1.34 MG/ML
1 INJECTION, SOLUTION SUBCUTANEOUS
Qty: 9 EACH | Refills: 11 | Status: SHIPPED | OUTPATIENT
Start: 2024-06-18

## 2024-06-18 RX ORDER — PANTOPRAZOLE SODIUM 40 MG/1
40 TABLET, DELAYED RELEASE ORAL DAILY
Qty: 90 TABLET | Refills: 11 | Status: SHIPPED | OUTPATIENT
Start: 2024-06-18

## 2024-06-18 RX ORDER — CITALOPRAM 10 MG/1
10 TABLET ORAL DAILY
Qty: 90 TABLET | Refills: 11 | Status: SHIPPED | OUTPATIENT
Start: 2024-06-18

## 2024-06-18 RX ORDER — ATORVASTATIN CALCIUM 20 MG/1
20 TABLET, FILM COATED ORAL DAILY
Qty: 90 TABLET | Refills: 11 | Status: SHIPPED | OUTPATIENT
Start: 2024-06-18

## 2024-06-18 RX ORDER — LISINOPRIL 40 MG/1
40 TABLET ORAL DAILY
Qty: 90 TABLET | Refills: 11 | Status: SHIPPED | OUTPATIENT
Start: 2024-06-18

## 2024-06-18 RX ORDER — AMLODIPINE BESYLATE 10 MG/1
10 TABLET ORAL DAILY
Qty: 90 TABLET | Refills: 11 | Status: SHIPPED | OUTPATIENT
Start: 2024-06-18

## 2024-06-18 RX ORDER — FUROSEMIDE 80 MG/1
80 TABLET ORAL 2 TIMES DAILY
Qty: 180 TABLET | Refills: 11 | Status: SHIPPED | OUTPATIENT
Start: 2024-06-18

## 2024-06-18 NOTE — PROGRESS NOTES
Established Patient - Audio Only Telehealth Visit     The patient location is: HOME  Visit type: Virtual visit with audio only (telephone)     The reason for the audio only service rather than synchronous audio and video virtual visit was related to technical difficulties or patient preference/necessity.     .GINO Talbot a 73 y.o. female here for 2 week post op wound check.    Surgery: Pt is POD 14 FROM ANGIOGRAM on 06/06/24 by Dr. Simon.      SUBJECTIVE    New Symptoms: reported still has dizziness. Not changed since prior to angiogram      PAIN MANAGEMENT     0,    INCISION (S)    Location: RIGHT WRIST    Incision Status: Clean, Dry, RIHCARD. Edges well-approximated. No drainage or swelling noted.     PICTURE    INTERVENTION    Incision: None    Medication Refills Requested: None     EDUCATION    Reviewed Post Op instructions with patient/caregiver.  Keep incision RICHARD, no lotions, creams or bandages.  Patient encouraged to walk as much as possible but advised to walk with someone and rest as necessary.  Take over the counter stool softner/laxative for constipation.  Call your doctor or report to the Emergency Room for any signs of infection, including: increased redness, drainage, pain or fever (temperature greater than or equal to 101.5 for 24 hours). Call doctor or go to the Emergency Room if there are any localized neurological changes; problems with speech, vision, numbness, tingling, weakness, or severe headache; or for other concerns.      All questions answered. Pt/caregiver encouraged to call clinic or send message through portal with any future concerns. Patient/caregiver verbalized understanding.     FOLLOW UP  Future Appointments   Date Time Provider Department Center   6/18/2024  9:00 AM Lei Garcia MD [facility]   6/19/2024 10:30 AM Rosa Ruiz RN [address]   7/9/2024 11:30 AM Sejal Simon MD [address]   7/19/2024 10:00 AM SAVI,  B[facility] CHEMO [facility] CHE[facility]   7/31/2024 10:30 AM [facility] OIC-MAMMO2 [facility] MAMMOIC Imaging [facility]tr   8/23/2024 10:00 AM [facility] CT OP LIMIT 450 LBS [facility] CTSCANO [facility]   10/22/2024 12:00 PM LAB, HEMONC CANCER BLDG [facility] LAB HO [facility]   10/22/2024  1:00 PM Rossana Motley MD [facility] HEMONC2 [facility]

## 2024-06-18 NOTE — PROGRESS NOTES
Subjective:       Patient ID: Ca Coats is a 73 y.o. female.    Chief Complaint: Annual Exam    Patient is here for followup for chronic conditions.    Has been seeing [facility] for cerebral aneurysms.    Chronic L knee pains, was supposed to have surgery for TKA but canceled due to aneurysms seen.    Stomach cancer -- ALDAIR at this time.    Still uses tobacco but working with smoking cessation.        Review of Systems   Constitutional:  Negative for activity change, appetite change and unexpected weight change.   Eyes:  Negative for visual disturbance.   Respiratory:  Negative for cough, chest tightness and shortness of breath.    Cardiovascular:  Negative for chest pain.   Gastrointestinal:  Positive for constipation. Negative for abdominal distention and abdominal pain.   Genitourinary:  Negative for difficulty urinating and urgency.        No breast lumps/masses/pain/nipple d/c in either breast     Musculoskeletal:  Positive for arthralgias and neck pain.   Skin:  Negative for rash and wound.   Psychiatric/Behavioral:  Positive for dysphoric mood. Negative for confusion.            Objective:      Physical Exam  Vitals reviewed.   Constitutional:       General: She is not in acute distress.     Appearance: Normal appearance. She is well-developed. She is obese. She is not ill-appearing, toxic-appearing or diaphoretic.      Comments: In wheelchair, diff to rise to exam table.  Here with her aide   HENT:      Head: Normocephalic and atraumatic.   Eyes:      General: No scleral icterus.     Pupils: Pupils are equal, round, and reactive to light.   Neck:      Thyroid: No thyromegaly.   Cardiovascular:      Rate and Rhythm: Normal rate and regular rhythm.      Heart sounds: Normal heart sounds. No murmur heard.     No friction rub. No gallop.   Pulmonary:      Effort: Pulmonary effort is normal. No respiratory distress.      Breath sounds: Normal breath sounds. No wheezing or rales.   Abdominal:      General:  "Bowel sounds are normal. There is no distension.      Palpations: Abdomen is soft. There is no mass.      Tenderness: There is no abdominal tenderness. There is no guarding or rebound.   Musculoskeletal:         General: Tenderness (shoulders, back) present. Normal range of motion.      Cervical back: Normal range of motion.      Right lower leg: Edema present.      Left lower leg: Edema present.   Lymphadenopathy:      Cervical: No cervical adenopathy.   Neurological:      General: No focal deficit present.      Mental Status: She is alert and oriented to person, place, and time.   Psychiatric:         Speech: Speech normal.         Behavior: Behavior normal.         Thought Content: Thought content normal.         Assessment:       1. Essential hypertension    2. Current mild episode of major depressive disorder without prior episode    3. Morbid obesity with BMI of 45.0-49.9, adult    4. Adjustment disorder, unspecified type    5. Mixed hyperlipidemia    6. Gastroesophageal reflux disease, unspecified whether esophagitis present    7. Chronic diastolic congestive heart failure    8. DVT of deep femoral vein, right        Plan:       Ca Beckwith" was seen today for annual exam.    Diagnoses and all orders for this visit:    Essential hypertension  -     lisinopriL (PRINIVIL,ZESTRIL) 40 MG tablet; Take 1 tablet (40 mg total) by mouth once daily.  -     amLODIPine (NORVASC) 10 MG tablet; Take 1 tablet (10 mg total) by mouth once daily.  controlled    Current mild episode of major depressive disorder without prior episode  Symptoms stable    Morbid obesity with BMI of 45.0-49.9, adult  -     semaglutide (OZEMPIC) 1 mg/dose (4 mg/3 mL); Inject 1 mg into the skin every 7 days.    Adjustment disorder, unspecified type  -     citalopram (CELEXA) 10 MG tablet; Take 1 tablet (10 mg total) by mouth once daily.    Mixed hyperlipidemia  -     atorvastatin (LIPITOR) 20 MG tablet; Take 1 tablet (20 mg total) by mouth once " daily.    Gastroesophageal reflux disease, unspecified whether esophagitis present  -     pantoprazole (PROTONIX) 40 MG tablet; Take 1 tablet (40 mg total) by mouth once daily.    Chronic diastolic congestive heart failure  -     furosemide (LASIX) 80 MG tablet; Take 1 tablet (80 mg total) by mouth 2 (two) times daily.  euvolemic    DVT of deep femoral vein, right  -     apixaban (ELIQUIS) 5 mg Tab; Take 1 tablet (5 mg total) by mouth 2 (two) times daily.  Could consider d/c, will confer with patient/ hematology      Health Maintenance         Date Due Completion Date    LDCT Lung Screen 01/02/2021 1/2/2020    COVID-19 Vaccine (9 - 2023-24 season) 12/27/2023 11/1/2023    Mammogram 07/31/2024 7/31/2023    Lipid Panel 12/03/2025 12/3/2020    DEXA Scan 11/01/2026 11/1/2023    Colorectal Cancer Screening 11/26/2028 11/26/2018    TETANUS VACCINE 01/02/2030 1/2/2020            Visit today included increased complexity associated with the care of the episodic problem  addressed and managing the longitudinal care of the patient due to the serious and/or complex managed problem(s) .    Follow up in about 1 year (around 6/18/2025) for Ms Mago Banks in 6 months, Radha in 12 months.    Future Appointments   Date Time Provider Department Center   7/9/2024 11:30 AM Sejal Simon MD [facility] NEUROS8 [facility]   7/19/2024 10:00 AM INJECTION, [facility] CHEMO [facility] CHEMO [facility]   7/31/2024 10:30 AM [facility] OIC-MAMMO2 [facility] MAMMOIC Imaging Ctr   8/23/2024 10:00 AM [facility] CT OP LIMIT 450 LBS [facility] CTSCANO [facility]   10/22/2024 12:00 PM LAB, HEMONC CANCER BLDG [facility] LAB HO [facility]   10/22/2024  1:00 PM Rossana Motley MD [facility] HEMONC2 [facility]   12/18/2024 10:30 AM Mago Banks FNP [facility] IM [facility] PCW   6/19/2025  9:40 AM Lei Garcia MD [facility]

## 2024-06-19 ENCOUNTER — CLINICAL SUPPORT (OUTPATIENT)
Dept: NEUROSURGERY | Facility: CLINIC | Age: 74
End: 2024-06-19
Payer: MEDICARE

## 2024-06-19 DIAGNOSIS — I67.1 CEREBRAL ANEURYSM, NONRUPTURED: Primary | ICD-10-CM

## 2024-06-23 ENCOUNTER — PATIENT MESSAGE (OUTPATIENT)
Dept: INTERNAL MEDICINE | Facility: CLINIC | Age: 74
End: 2024-06-23
Payer: MEDICARE

## 2024-06-27 DIAGNOSIS — G89.4 CHRONIC PAIN SYNDROME: ICD-10-CM

## 2024-06-27 RX ORDER — OXYCODONE AND ACETAMINOPHEN 5; 325 MG/1; MG/1
1 TABLET ORAL EVERY 8 HOURS PRN
Qty: 90 TABLET | Refills: 0 | Status: SHIPPED | OUTPATIENT
Start: 2024-07-03 | End: 2024-08-02

## 2024-06-27 NOTE — TELEPHONE ENCOUNTER
Patient requesting refill on oxycodone  Last office visit 04-16-24   shows last refill on 06-03-24  Patient does have a pain contract on file with [facility]  Patient last UDS 04-16-24 was consistent with current therapy     CODEINE   Not Detected Not Detected Not Detected   Comment: INTERPRETIVE INFORMATION: Codeine, U  Positive Cutoff: 40 ng/mL  Methodology: Mass Spectrometry   MORPHINE   Not Detected Not Detected Not Detected   Comment: INTERPRETIVE INFORMATION:Morphine, U  Positive Cutoff: 20 ng/mL  Methodology: Mass Spectrometry   6-ACETYLMORPHINE   Not Detected Not Detected Not Detected   Comment: INTERPRETIVE INFORMATION:6-acetylmorphine, U  Positive Cutoff: 20 ng/mL  Methodology: Mass Spectrometry   OXYCODONE   Present Present Present   Comment: INTERPRETIVE INFORMATION:Oxycodone, U  Positive Cutoff: 40 ng/mL  Methodology: Mass Spectrometry   NOROYXCODONE   Present Present Present   Comment: INTERPRETIVE INFORMATION:Noroxycodone, U  Positive Cutoff: 100 ng/mL  Methodology: Mass Spectrometry   OXYMORPHONE   Present Present Not Detected   Comment: INTERPRETIVE INFORMATION:Oxymorphone, U  Positive Cutoff: 40 ng/mL  Methodology: Mass Spectrometry   NOROXYMORPHONE   Present Present Not Detected   Comment: INTERPRETIVE INFORMATION:Noroxymorphone, U  Positive Cutoff: 100 ng/mL  Methodology: Mass Spectrometry   HYDROCODONE   Not Detected Not Detected Not Detected   Comment: INTERPRETIVE INFORMATION:Hydrocodone, U  Positive Cutoff: 40 ng/mL  Methodology: Mass Spectrometry   NORHYDROCODONE   Not Detected Not Detected Not Detected   Comment: INTERPRETIVE INFORMATION:Norhydrocodone, U  Positive Cutoff: 100 ng/mL  Methodology: Mass Spectrometry   HYDROMORPHONE   Not Detected Not Detected Not Detected   Comment: INTERPRETIVE INFORMATION:Hydromorphone, U  Positive Cutoff: 20 ng/mL  Methodology: Mass Spectrometry   BUPRENORPHINE   Not Detected Not Detected Not Detected   Comment:  INTERPRETIVE INFORMATION:Buprenorphine, U  Positive Cutoff: 5 ng/mL  Methodology: Mass Spectrometry   NORUBPRENORPHINE   Not Detected Not Detected Not Detected   Comment: INTERPRETIVE INFORMATION:Norbuprenorphine, U  Positive Cutoff: 20 ng/mL  Methodology: Mass Spectrometry   FENTANYL   Not Detected Not Detected Not Detected   Comment: INTERPRETIVE INFORMATION:Fentanyl, U  Positive Cutoff: 2 ng/mL  Methodology: Mass Spectrometry   NORFENTANYL   Not Detected Not Detected Not Detected   Comment: INTERPRETIVE INFORMATION:Norfentanyl, U  Positive Cutoff: 2 ng/mL  Methodology: Mass Spectrometry   MEPERIDINE METABOLITE   Not Detected Not Detected Not Detected   Comment: INTERPRETIVE INFORMATION:Meperidine metabolite, U  Positive Cutoff: 50 ng/mL  Methodology: Mass Spectrometry   TAPENTADOL   Not Detected Not Detected Not Detected   Comment: INTERPRETIVE INFORMATION:Tapentadol, U  Positive Cutoff: 100 ng/mL  Methodology: Mass Spectrometry   TAPENTADOL-O-SULF   Not Detected Not Detected Not Detected   Comment: INTERPRETIVE INFORMATION:Tapentadol-o-Sulf, U  Positive Cutoff: 200 ng/mL  Methodology: Mass Spectrometry   METHADONE   Negative Not Detected Not Detected   Comment: Presumptive negative by immunoassay. Testing by mass  spectrometry is available on request.  INTERPRETIVE INFORMATION: Methadone Screen, U  Positive Cutoff: 150 ng/mL  Methodology: Immunoassay   TRAMADOL   Negative Not Detected Not Detected   Comment: Presumptive negative by immunoassay. Testing by mass  spectrometry is available on request.  INTERPRETIVE INFORMATION:Tramadol Screen, U  Positive Cutoff: 100 ng/mL  Methodology: Immunoassay   AMPHETAMINE   Not Detected Not Detected Not Detected   Comment: INTERPRETIVE INFORMATION:Amphetamine, U  Positive Cutoff: 50 ng/mL  Methodology: Mass Spectrometry   METHAMPHETAMINE   Not Detected Not Detected Not Detected   Comment: INTERPRETIVE INFORMATION:Methamphetamine, U  Positive Cutoff: 200 ng/mL  Methodology:  Mass Spectrometry   MDMA- ECSTASY   Not Detected Not Detected Not Detected   Comment: INTERPRETIVE INFORMATION:MDMA, U  Positive Cutoff: 200 ng/mL  Methodology: Mass Spectrometry   MDA   Not Detected Not Detected Not Detected   Comment: INTERPRETIVE INFORMATION:MDA, U  Positive Cutoff: 200 ng/mL  Methodology: Mass Spectrometry   MDEA- Genoveva   Not Detected Not Detected Not Detected   Comment: INTERPRETIVE INFORMATION:MDEA, U  Positive Cutoff: 200 ng/mL  Methodology: Mass Spectrometry   METHYLPHENIDATE   Not Detected Not Detected Not Detected   Comment: INTERPRETIVE INFORMATION:Methylphenidate, U  Positive Cutoff: 100 ng/mL  Methodology: Mass Spectrometry   PHENTERMINE   Not Detected Not Detected Not Detected   Comment: INTERPRETIVE INFORMATION:Phentermine, U  Positive Cutoff: 100 ng/mL  Methodology: Mass Spectrometry   BENZOYLECGONINE   Negative Not Detected Not Detected   Comment: Presumptive negative by immunoassay. Testing by mass  spectrometry is available on request.  INTERPRETIVE INFORMATION:Cocaine Screen, U  Positive Cutoff: 150 ng/mL  Methodology: Immunoassay   ALPRAZOLAM   Not Detected Not Detected Not Detected   Comment: INTERPRETIVE INFORMATION:Alprazolam, U  Positive Cutoff: 40 ng/mL  Methodology: Mass Spectrometry   ALPHA-OH-ALPRAZOLAM   Not Detected Not Detected Not Detected   Comment: INTERPRETIVE INFORMATION:Alpha-OH-Alprazolam, U  Positive Cutoff: 20 ng/mL  Methodology: Mass Spectrometry   CLONAZEPAM   Not Detected Not Detected Not Detected   Comment: INTERPRETIVE INFORMATION:Clonazepam, U  Positive Cutoff: 20 ng/mL  Methodology: Mass Spectrometry   7-AMINOCLONAZEPAM   Not Detected Not Detected Not Detected   Comment: INTERPRETIVE INFORMATION:7-Aminoclonazepam, U  Positive Cutoff: 40 ng/mL  Methodology: Mass Spectrometry   DIAZEPAM   Not Detected Not Detected Not Detected   Comment: INTERPRETIVE INFORMATION:Diazepam, U  Positive Cutoff: 50 ng/mL  Methodology: Mass Spectrometry   NORDIAZEPAM   Not  Detected Not Detected Not Detected   Comment: INTERPRETIVE INFORMATION:Nordiazepam, U  Positive Cutoff: 50 ng/mL  Methodology: Mass Spectrometry   OXAZEPAM   Not Detected Not Detected Not Detected   Comment: INTERPRETIVE INFORMATION:Oxazepam, U  Positive Cutoff: 50 ng/mL  Methodology: Mass Spectrometry   TEMAZEPAM   Not Detected Not Detected Not Detected   Comment: INTERPRETIVE INFORMATION:Temazepam, U  Positive Cutoff: 50 ng/mL  Methodology: Mass Spectrometry   Lorazepam   Not Detected Not Detected Not Detected   Comment: INTERPRETIVE INFORMATION:Lorazepam, U  Positive Cutoff: 60 ng/mL  Methodology: Mass Spectrometry   MIDAZOLAM   Not Detected Not Detected Not Detected   Comment: INTERPRETIVE INFORMATION:Midazolam, U  Positive Cutoff: 20 ng/mL  Methodology: Mass Spectrometry   ZOLPIDEM   Not Detected Not Detected Not Detected   Comment: INTERPRETIVE INFORMATION:Zolpidem, U  Positive Cutoff: 20 ng/mL  Methodology: Mass Spectrometry   BARBITURATES   Negative Not Detected Not Detected   Comment: Presumptive negative by immunoassay. Testing by mass  spectrometry is available on request.  INTERPRETIVE INFORMATION:Barbiturates Screen, U  Positive Cutoff: 200 ng/mL  Methodology: Immunoassay   Creatinine, Urine 20.0 - 400.0 mg/dL 102.8 205.6 102.0   ETHYL GLUCURONIDE   Negative Present Not Detected   Comment: Presumptive negative by immunoassay. Testing by mass  spectrometry is available on request.  INTERPRETIVE INFORMATION:Ethyl Glucuronide Screen, U  Positive Cutoff: 500 ng/mL  Methodology: Immunoassay   MARIJUANA METABOLITE   Negative Not Detected Not Detected   Comment: Presumptive negative by immunoassay. Testing by mass  spectrometry is available on request.  INTERPRETIVE INFORMATION: THC (Cannabinoids) Screen, U  Positive Cutoff: 50 ng/mL  Methodology: Immunoassay   PCP   Negative Not Detected Not Detected   Comment: Presumptive negative by immunoassay. Testing by mass  spectrometry is available on  request.  INTERPRETIVE INFORMATION:Phencyclidine Screen, U  Positive Cutoff: 25 ng/mL  Methodology: Immunoassay   CARISOPRODOL   Negative Not Detected CM Not Detected CM   Comment: Presumptive negative by immunoassay. Testing by mass  spectrometry is available on request.  INTERPRETIVE INFORMATION: Carisoprodol Screen, U  Positive Cutoff: 100 ng/mL  Methodology: Immunoassay  The carisoprodol immunoassay has cross-reactivity to  carisoprodol and meprobamate.   Naloxone   Not Detected Not Detected     Comment: INTERPRETIVE INFORMATION:Naloxone, U  Positive Cutoff: 100 ng/mL  Methodology: Mass Spectrometry   Gabapentin   Present Present     Comment: INTERPRETIVE INFORMATION:Gabapentin, U  Positive Cutoff: 3,000 ng/mL  Methodology: Mass Spectrometry   Pregabalin   Not Detected Not Detected     Comment: INTERPRETIVE INFORMATION:Pregabalin, U  Positive Cutoff: 3,000 ng/mL  Methodology: Mass Spectrometry   Alpha-OH-Midazolam   Not Detected Not Detected     Comment: INTERPRETIVE INFORMATION:Alpha-OH-Midazolam, U  Positive Cutoff: 20 ng/mL  Methodology: Mass Spectrometry   Zolpidem Metabolite   Not Detected Not Detected     Comment: INTERPRETIVE INFORMATION:Zolpidem Metabolite, U  Positive Cutoff: 100 ng/mL  Methodology: Mass Spectrometry   PAIN MANAGEMENT DRUG PANEL   See Below See Below CM See Mercedes

## 2024-06-27 NOTE — TELEPHONE ENCOUNTER
----- Message from Yvonne Pineda sent at 6/27/2024 11:12 AM CDT -----  Regarding: Refill Request    Who Called: Patient        New Prescription or Refill : Refill    RX Name and Strength:   oxyCODONE-acetaminophen (PERCOCET) 5-325 mg per tablet      RX Name and Strength:         RX Name and Strength:      30 day or 90 day RX:     Preferred Pharmacy:[facility]        Would the patient rather a call back or a response via [facility]?    Best Call Back Number:  [phone]    Additional Information:

## 2024-07-08 ENCOUNTER — TELEPHONE (OUTPATIENT)
Dept: NEUROSURGERY | Facility: CLINIC | Age: 74
End: 2024-07-08
Payer: MEDICARE

## 2024-07-08 NOTE — TELEPHONE ENCOUNTER
Returned call to pt. Advised pt to followed up with her neurologist for those symptoms. Offered the appointment with Natasha RAYGOZA for tomorrow. Pt declined due to transportation. Apologized to pt but informed that we don't have anything sooner. Pt was upset but v/u understanding.

## 2024-07-08 NOTE — TELEPHONE ENCOUNTER
----- Message from Rachel Lopez sent at 7/8/2024 10:53 AM CDT -----  Regarding: appt  Contact: [phone]  (Nurse Lyle with [facility])  called in with patient about her appt for tomorrow that was cancelled and rescheduled for 08/06 at 9:30am but patient would like to be seen on a earlier date if possible because patient having serve headaches, dizziness, spinning and blurred vision and that she was told that the Dr was to busy which is why her appt for today was cancelled.   Patient's blood pressure was 117/60 on 07/06 please contact patient at [phone]

## 2024-07-19 ENCOUNTER — INFUSION (OUTPATIENT)
Dept: INFUSION THERAPY | Facility: OTHER | Age: 74
End: 2024-07-19
Attending: STUDENT IN AN ORGANIZED HEALTH CARE EDUCATION/TRAINING PROGRAM
Payer: MEDICARE

## 2024-07-19 VITALS
TEMPERATURE: 99 F | HEART RATE: 69 BPM | RESPIRATION RATE: 16 BRPM | OXYGEN SATURATION: 98 % | DIASTOLIC BLOOD PRESSURE: 53 MMHG | SYSTOLIC BLOOD PRESSURE: 103 MMHG

## 2024-07-19 DIAGNOSIS — C16.9 GASTRIC ADENOCARCINOMA: Primary | ICD-10-CM

## 2024-07-19 PROCEDURE — A4216 STERILE WATER/SALINE, 10 ML: HCPCS | Mod: HCNC | Performed by: STUDENT IN AN ORGANIZED HEALTH CARE EDUCATION/TRAINING PROGRAM

## 2024-07-19 PROCEDURE — 63600175 PHARM REV CODE 636 W HCPCS: Mod: HCNC | Performed by: STUDENT IN AN ORGANIZED HEALTH CARE EDUCATION/TRAINING PROGRAM

## 2024-07-19 PROCEDURE — 96523 IRRIG DRUG DELIVERY DEVICE: CPT | Mod: HCNC

## 2024-07-19 PROCEDURE — 25000003 PHARM REV CODE 250: Mod: HCNC | Performed by: STUDENT IN AN ORGANIZED HEALTH CARE EDUCATION/TRAINING PROGRAM

## 2024-07-19 RX ORDER — HEPARIN 100 UNIT/ML
500 SYRINGE INTRAVENOUS
OUTPATIENT
Start: 2024-07-19

## 2024-07-19 RX ORDER — SODIUM CHLORIDE 0.9 % (FLUSH) 0.9 %
10 SYRINGE (ML) INJECTION
OUTPATIENT
Start: 2024-07-19

## 2024-07-19 RX ORDER — SODIUM CHLORIDE 0.9 % (FLUSH) 0.9 %
10 SYRINGE (ML) INJECTION
Status: DISCONTINUED | OUTPATIENT
Start: 2024-07-19 | End: 2024-07-19 | Stop reason: HOSPADM

## 2024-07-19 RX ORDER — HEPARIN 100 UNIT/ML
500 SYRINGE INTRAVENOUS
Status: DISCONTINUED | OUTPATIENT
Start: 2024-07-19 | End: 2024-07-19 | Stop reason: HOSPADM

## 2024-07-19 RX ADMIN — HEPARIN 500 UNITS: 100 SYRINGE at 09:07

## 2024-07-19 RX ADMIN — SODIUM CHLORIDE, PRESERVATIVE FREE 10 ML: 5 INJECTION INTRAVENOUS at 09:07

## 2024-07-29 DIAGNOSIS — M17.12 PRIMARY OSTEOARTHRITIS OF LEFT KNEE: ICD-10-CM

## 2024-07-29 DIAGNOSIS — G89.4 CHRONIC PAIN SYNDROME: ICD-10-CM

## 2024-07-29 DIAGNOSIS — M25.562 CHRONIC PAIN OF LEFT KNEE: ICD-10-CM

## 2024-07-29 DIAGNOSIS — G89.29 CHRONIC PAIN OF LEFT KNEE: ICD-10-CM

## 2024-07-29 DIAGNOSIS — G89.4 CHRONIC PAIN DISORDER: ICD-10-CM

## 2024-07-29 NOTE — TELEPHONE ENCOUNTER
----- Message from Mirian Cabrera sent at 7/29/2024 11:27 AM CDT -----  Contact: Ruslan  Type:  Patient Call          Who Called: Patient         Does the patient know what this is regarding?: Requesting a call back to have a refill on Rx oxyCODONE-acetaminophen (PERCOCET) 5-325 mg per tablet ;  pt would also like to have a refill on Rx  gabapentin if it's time for a refill & send it to [facility] ; please advise           Would the patient rather a call back or a response via [facility]?call           Best Call Back Number:[phone]             Additional Information:   [facility]  [address] [place], [place] [id number]  Phone: [phone] Fax: [phone]

## 2024-07-29 NOTE — TELEPHONE ENCOUNTER
Patient requesting refill on oxycodone and gabapentin   Last office visit 04-16-24   shows last refill on 07-03-24  Patient does have a pain contract on file with [facility] department  Patient last UDS 04-16-24 was consistent with current therapy     CODEINE   Not Detected Not Detected Not Detected   Comment: INTERPRETIVE INFORMATION: Codeine, U  Positive Cutoff: 40 ng/mL  Methodology: Mass Spectrometry   MORPHINE   Not Detected Not Detected Not Detected   Comment: INTERPRETIVE INFORMATION:Morphine, U  Positive Cutoff: 20 ng/mL  Methodology: Mass Spectrometry   6-ACETYLMORPHINE   Not Detected Not Detected Not Detected   Comment: INTERPRETIVE INFORMATION:6-acetylmorphine, U  Positive Cutoff: 20 ng/mL  Methodology: Mass Spectrometry   OXYCODONE   Present Present Present   Comment: INTERPRETIVE INFORMATION:Oxycodone, U  Positive Cutoff: 40 ng/mL  Methodology: Mass Spectrometry   NOROYXCODONE   Present Present Present   Comment: INTERPRETIVE INFORMATION:Noroxycodone, U  Positive Cutoff: 100 ng/mL  Methodology: Mass Spectrometry   OXYMORPHONE   Present Present Not Detected   Comment: INTERPRETIVE INFORMATION:Oxymorphone, U  Positive Cutoff: 40 ng/mL  Methodology: Mass Spectrometry   NOROXYMORPHONE   Present Present Not Detected   Comment: INTERPRETIVE INFORMATION:Noroxymorphone, U  Positive Cutoff: 100 ng/mL  Methodology: Mass Spectrometry   HYDROCODONE   Not Detected Not Detected Not Detected   Comment: INTERPRETIVE INFORMATION:Hydrocodone, U  Positive Cutoff: 40 ng/mL  Methodology: Mass Spectrometry   NORHYDROCODONE   Not Detected Not Detected Not Detected   Comment: INTERPRETIVE INFORMATION:Norhydrocodone, U  Positive Cutoff: 100 ng/mL  Methodology: Mass Spectrometry   HYDROMORPHONE   Not Detected Not Detected Not Detected   Comment: INTERPRETIVE INFORMATION:Hydromorphone, U  Positive Cutoff: 20 ng/mL  Methodology: Mass Spectrometry   BUPRENORPHINE   Not Detected Not Detected Not Detected    Comment: INTERPRETIVE INFORMATION:Buprenorphine, U  Positive Cutoff: 5 ng/mL  Methodology: Mass Spectrometry   NORUBPRENORPHINE   Not Detected Not Detected Not Detected   Comment: INTERPRETIVE INFORMATION:Norbuprenorphine, U  Positive Cutoff: 20 ng/mL  Methodology: Mass Spectrometry   FENTANYL   Not Detected Not Detected Not Detected   Comment: INTERPRETIVE INFORMATION:Fentanyl, U  Positive Cutoff: 2 ng/mL  Methodology: Mass Spectrometry   NORFENTANYL   Not Detected Not Detected Not Detected   Comment: INTERPRETIVE INFORMATION:Norfentanyl, U  Positive Cutoff: 2 ng/mL  Methodology: Mass Spectrometry   MEPERIDINE METABOLITE   Not Detected Not Detected Not Detected   Comment: INTERPRETIVE INFORMATION:Meperidine metabolite, U  Positive Cutoff: 50 ng/mL  Methodology: Mass Spectrometry   TAPENTADOL   Not Detected Not Detected Not Detected   Comment: INTERPRETIVE INFORMATION:Tapentadol, U  Positive Cutoff: 100 ng/mL  Methodology: Mass Spectrometry   TAPENTADOL-O-SULF   Not Detected Not Detected Not Detected   Comment: INTERPRETIVE INFORMATION:Tapentadol-o-Sulf, U  Positive Cutoff: 200 ng/mL  Methodology: Mass Spectrometry   METHADONE   Negative Not Detected Not Detected   Comment: Presumptive negative by immunoassay. Testing by mass  spectrometry is available on request.  INTERPRETIVE INFORMATION: Methadone Screen, U  Positive Cutoff: 150 ng/mL  Methodology: Immunoassay   TRAMADOL   Negative Not Detected Not Detected   Comment: Presumptive negative by immunoassay. Testing by mass  spectrometry is available on request.  INTERPRETIVE INFORMATION:Tramadol Screen, U  Positive Cutoff: 100 ng/mL  Methodology: Immunoassay   AMPHETAMINE   Not Detected Not Detected Not Detected   Comment: INTERPRETIVE INFORMATION:Amphetamine, U  Positive Cutoff: 50 ng/mL  Methodology: Mass Spectrometry   METHAMPHETAMINE   Not Detected Not Detected Not Detected   Comment: INTERPRETIVE INFORMATION:Methamphetamine, U  Positive Cutoff: 200  ng/mL  Methodology: Mass Spectrometry   MDMA- ECSTASY   Not Detected Not Detected Not Detected   Comment: INTERPRETIVE INFORMATION:MDMA, U  Positive Cutoff: 200 ng/mL  Methodology: Mass Spectrometry   MDA   Not Detected Not Detected Not Detected   Comment: INTERPRETIVE INFORMATION:MDA, U  Positive Cutoff: 200 ng/mL  Methodology: Mass Spectrometry   MDEA- Genoveva   Not Detected Not Detected Not Detected   Comment: INTERPRETIVE INFORMATION:MDEA, U  Positive Cutoff: 200 ng/mL  Methodology: Mass Spectrometry   METHYLPHENIDATE   Not Detected Not Detected Not Detected   Comment: INTERPRETIVE INFORMATION:Methylphenidate, U  Positive Cutoff: 100 ng/mL  Methodology: Mass Spectrometry   PHENTERMINE   Not Detected Not Detected Not Detected   Comment: INTERPRETIVE INFORMATION:Phentermine, U  Positive Cutoff: 100 ng/mL  Methodology: Mass Spectrometry   BENZOYLECGONINE   Negative Not Detected Not Detected   Comment: Presumptive negative by immunoassay. Testing by mass  spectrometry is available on request.  INTERPRETIVE INFORMATION:Cocaine Screen, U  Positive Cutoff: 150 ng/mL  Methodology: Immunoassay   ALPRAZOLAM   Not Detected Not Detected Not Detected   Comment: INTERPRETIVE INFORMATION:Alprazolam, U  Positive Cutoff: 40 ng/mL  Methodology: Mass Spectrometry   ALPHA-OH-ALPRAZOLAM   Not Detected Not Detected Not Detected   Comment: INTERPRETIVE INFORMATION:Alpha-OH-Alprazolam, U  Positive Cutoff: 20 ng/mL  Methodology: Mass Spectrometry   CLONAZEPAM   Not Detected Not Detected Not Detected   Comment: INTERPRETIVE INFORMATION:Clonazepam, U  Positive Cutoff: 20 ng/mL  Methodology: Mass Spectrometry   7-AMINOCLONAZEPAM   Not Detected Not Detected Not Detected   Comment: INTERPRETIVE INFORMATION:7-Aminoclonazepam, U  Positive Cutoff: 40 ng/mL  Methodology: Mass Spectrometry   DIAZEPAM   Not Detected Not Detected Not Detected   Comment: INTERPRETIVE INFORMATION:Diazepam, U  Positive Cutoff: 50 ng/mL  Methodology: Mass Spectrometry    NORDIAZEPAM   Not Detected Not Detected Not Detected   Comment: INTERPRETIVE INFORMATION:Nordiazepam, U  Positive Cutoff: 50 ng/mL  Methodology: Mass Spectrometry   OXAZEPAM   Not Detected Not Detected Not Detected   Comment: INTERPRETIVE INFORMATION:Oxazepam, U  Positive Cutoff: 50 ng/mL  Methodology: Mass Spectrometry   TEMAZEPAM   Not Detected Not Detected Not Detected   Comment: INTERPRETIVE INFORMATION:Temazepam, U  Positive Cutoff: 50 ng/mL  Methodology: Mass Spectrometry   Lorazepam   Not Detected Not Detected Not Detected   Comment: INTERPRETIVE INFORMATION:Lorazepam, U  Positive Cutoff: 60 ng/mL  Methodology: Mass Spectrometry   MIDAZOLAM   Not Detected Not Detected Not Detected   Comment: INTERPRETIVE INFORMATION:Midazolam, U  Positive Cutoff: 20 ng/mL  Methodology: Mass Spectrometry   ZOLPIDEM   Not Detected Not Detected Not Detected   Comment: INTERPRETIVE INFORMATION:Zolpidem, U  Positive Cutoff: 20 ng/mL  Methodology: Mass Spectrometry   BARBITURATES   Negative Not Detected Not Detected   Comment: Presumptive negative by immunoassay. Testing by mass  spectrometry is available on request.  INTERPRETIVE INFORMATION:Barbiturates Screen, U  Positive Cutoff: 200 ng/mL  Methodology: Immunoassay   Creatinine, Urine 20.0 - 400.0 mg/dL 102.8 205.6 102.0   ETHYL GLUCURONIDE   Negative Present Not Detected   Comment: Presumptive negative by immunoassay. Testing by mass  spectrometry is available on request.  INTERPRETIVE INFORMATION:Ethyl Glucuronide Screen, U  Positive Cutoff: 500 ng/mL  Methodology: Immunoassay   MARIJUANA METABOLITE   Negative Not Detected Not Detected   Comment: Presumptive negative by immunoassay. Testing by mass  spectrometry is available on request.  INTERPRETIVE INFORMATION: THC (Cannabinoids) Screen, U  Positive Cutoff: 50 ng/mL  Methodology: Immunoassay   PCP   Negative Not Detected Not Detected   Comment: Presumptive negative by immunoassay. Testing by mass  spectrometry is available  on request.  INTERPRETIVE INFORMATION:Phencyclidine Screen, U  Positive Cutoff: 25 ng/mL  Methodology: Immunoassay   CARISOPRODOL   Negative Not Detected CM Not Detected CM   Comment: Presumptive negative by immunoassay. Testing by mass  spectrometry is available on request.  INTERPRETIVE INFORMATION: Carisoprodol Screen, U  Positive Cutoff: 100 ng/mL  Methodology: Immunoassay  The carisoprodol immunoassay has cross-reactivity to  carisoprodol and meprobamate.   Naloxone   Not Detected Not Detected     Comment: INTERPRETIVE INFORMATION:Naloxone, U  Positive Cutoff: 100 ng/mL  Methodology: Mass Spectrometry   Gabapentin   Present Present     Comment: INTERPRETIVE INFORMATION:Gabapentin, U  Positive Cutoff: 3,000 ng/mL  Methodology: Mass Spectrometry   Pregabalin   Not Detected Not Detected     Comment: INTERPRETIVE INFORMATION:Pregabalin, U  Positive Cutoff: 3,000 ng/mL  Methodology: Mass Spectrometry   Alpha-OH-Midazolam   Not Detected Not Detected     Comment: INTERPRETIVE INFORMATION:Alpha-OH-Midazolam, U  Positive Cutoff: 20 ng/mL  Methodology: Mass Spectrometry   Zolpidem Metabolite   Not Detected Not Detected     Comment: INTERPRETIVE INFORMATION:Zolpidem Metabolite, U  Positive Cutoff: 100 ng/mL  Methodology: Mass Spectrometry   PAIN MANAGEMENT DRUG PANEL   See Below See Below CM

## 2024-07-30 DIAGNOSIS — G89.4 CHRONIC PAIN SYNDROME: ICD-10-CM

## 2024-07-30 RX ORDER — GABAPENTIN 300 MG/1
600 CAPSULE ORAL 3 TIMES DAILY
Qty: 540 CAPSULE | Refills: 2 | Status: SHIPPED | OUTPATIENT
Start: 2024-07-30

## 2024-07-30 RX ORDER — OXYCODONE AND ACETAMINOPHEN 5; 325 MG/1; MG/1
1 TABLET ORAL EVERY 8 HOURS PRN
Qty: 90 TABLET | Refills: 0 | Status: SHIPPED | OUTPATIENT
Start: 2024-07-30 | End: 2024-08-31

## 2024-07-30 RX ORDER — OXYCODONE AND ACETAMINOPHEN 5; 325 MG/1; MG/1
1 TABLET ORAL EVERY 8 HOURS PRN
Qty: 90 TABLET | Refills: 0 | Status: CANCELLED | OUTPATIENT
Start: 2024-07-30 | End: 2024-08-29

## 2024-07-30 NOTE — TELEPHONE ENCOUNTER
Patient called in requesting her pain meds. Patient has not been seen in the clinic within the last 3 months. Patient has a signed pain contract on file, staff tried contacting the patient to get her schedule with a 3 month f/u patient did not answer. Staff left a message.

## 2024-07-31 ENCOUNTER — HOSPITAL ENCOUNTER (OUTPATIENT)
Dept: RADIOLOGY | Facility: HOSPITAL | Age: 74
Discharge: HOME | End: 2024-07-31
Attending: INTERNAL MEDICINE
Payer: MEDICARE

## 2024-07-31 DIAGNOSIS — Z12.31 ENCOUNTER FOR SCREENING MAMMOGRAM FOR MALIGNANT NEOPLASM OF BREAST: ICD-10-CM

## 2024-07-31 DIAGNOSIS — Z12.31 SCREENING MAMMOGRAM, ENCOUNTER FOR: ICD-10-CM

## 2024-07-31 PROCEDURE — 77067 SCR MAMMO BI INCL CAD: CPT | Mod: 26,GA,HCNC, | Performed by: RADIOLOGY

## 2024-07-31 PROCEDURE — 77067 SCR MAMMO BI INCL CAD: CPT | Mod: GA,TC,HCNC

## 2024-07-31 PROCEDURE — 77063 BREAST TOMOSYNTHESIS BI: CPT | Mod: 26,GA,HCNC, | Performed by: RADIOLOGY

## 2024-08-02 ENCOUNTER — TELEPHONE (OUTPATIENT)
Dept: INTERNAL MEDICINE | Facility: CLINIC | Age: 74
End: 2024-08-02
Payer: MEDICARE

## 2024-08-06 ENCOUNTER — OFFICE VISIT (OUTPATIENT)
Dept: NEUROSURGERY | Facility: CLINIC | Age: 74
End: 2024-08-06
Payer: MEDICARE

## 2024-08-06 VITALS — SYSTOLIC BLOOD PRESSURE: 128 MMHG | DIASTOLIC BLOOD PRESSURE: 74 MMHG | HEART RATE: 69 BPM

## 2024-08-06 DIAGNOSIS — I67.1 CEREBRAL ANEURYSM, NONRUPTURED: Primary | ICD-10-CM

## 2024-08-06 PROCEDURE — 4010F ACE/ARB THERAPY RXD/TAKEN: CPT | Mod: HCNC,CPTII,S$GLB, | Performed by: NEUROLOGICAL SURGERY

## 2024-08-06 PROCEDURE — 1101F PT FALLS ASSESS-DOCD LE1/YR: CPT | Mod: HCNC,CPTII,S$GLB, | Performed by: NEUROLOGICAL SURGERY

## 2024-08-06 PROCEDURE — 3078F DIAST BP <80 MM HG: CPT | Mod: HCNC,CPTII,S$GLB, | Performed by: NEUROLOGICAL SURGERY

## 2024-08-06 PROCEDURE — 3288F FALL RISK ASSESSMENT DOCD: CPT | Mod: HCNC,CPTII,S$GLB, | Performed by: NEUROLOGICAL SURGERY

## 2024-08-06 PROCEDURE — 1159F MED LIST DOCD IN RCRD: CPT | Mod: HCNC,CPTII,S$GLB, | Performed by: NEUROLOGICAL SURGERY

## 2024-08-06 PROCEDURE — 1126F AMNT PAIN NOTED NONE PRSNT: CPT | Mod: HCNC,CPTII,S$GLB, | Performed by: NEUROLOGICAL SURGERY

## 2024-08-06 PROCEDURE — 99215 OFFICE O/P EST HI 40 MIN: CPT | Mod: HCNC,S$GLB,, | Performed by: NEUROLOGICAL SURGERY

## 2024-08-06 PROCEDURE — 3074F SYST BP LT 130 MM HG: CPT | Mod: HCNC,CPTII,S$GLB, | Performed by: NEUROLOGICAL SURGERY

## 2024-08-06 PROCEDURE — 99999 PR PBB SHADOW E&M-EST. PATIENT-LVL III: CPT | Mod: PBBFAC,HCNC,, | Performed by: NEUROLOGICAL SURGERY

## 2024-08-06 PROCEDURE — 3044F HG A1C LEVEL LT 7.0%: CPT | Mod: HCNC,CPTII,S$GLB, | Performed by: NEUROLOGICAL SURGERY

## 2024-08-06 RX ORDER — ASPIRIN 325 MG
325 TABLET, DELAYED RELEASE (ENTERIC COATED) ORAL DAILY
Refills: 11
Start: 2024-08-06 | End: 2025-08-06

## 2024-08-06 RX ORDER — CLOPIDOGREL BISULFATE 75 MG/1
75 TABLET ORAL DAILY
Qty: 30 TABLET | Refills: 11 | Status: SHIPPED | OUTPATIENT
Start: 2024-08-06 | End: 2025-08-06

## 2024-08-06 NOTE — PROGRESS NOTES
Neurosurgery  Established Patient    Scribe Attestation  I, Myrna Pappas, attest that this documentation has been prepared under the direction and in the presence of Sejal Simon MD.    SUBJECTIVE:     History of Present Illness 02/06/24:  73-year-old female, referred to me by Dr. Staton, past medical history of hypertension, obstructive sleep apnea on CPAP, suggestive heart failure, obesity, history of gastric cancer status post chemoradiation and DVT in currently on Eliquis.  She also has a current smoker she was initially referred to Neurology for workup for dizziness. She had been complaining of vertigo multiple times a day for the past several months, associated with shortness of breath, palpitation, nausea, headache, and feeling faint. She felt like the room is spinning but at the same time denied any chest pain or loss of consciousness. She felt the episodes of normally when she was bending over standing up from sitting position, but they did occur to her while she was sitting as well. They did not occur when she was lying down in bed.  She patient noted that the dizziness resolved after sitting still for about 10-15 minutes. Denied any recent or remote trauma. No known history of stroke. She says she notes she feels dizziness much of the time she feels on the right temple. She notes some nausea, denies any headache. She has a 55 pack-year history of smoking. She occasionally drinks alcohol, denies any illicit drug use. Unclear of any family history of aneurysms or stroke. She believes he had a brother who may have had a stroke but he is long since passed away. This was a virtual audio visual visit.     Interval history 08/06/24:  Patient returns to clinic for 4-6 wk f/u after undergoing diagnostic cerebral angiogram revealing dysplastic basilar apex aneurysm, left anterior choroidal ICA aneurysm, left MCA bifurcation, and small left ophthalmic artery aneurysm. Today she reports feeling overall okay. No  new acute changes or issues to report. Patient presents in a wheelchair. No family history of aneurysms. Smoking history positive (refer to above). Patient currently smokes. Endorses compliance with Eliquis.     Review of patient's allergies indicates:  No Known Allergies    Current Outpatient Medications   Medication Sig Dispense Refill    acetaminophen (TYLENOL) 500 MG tablet Take by mouth daily as needed.      amLODIPine (NORVASC) 10 MG tablet Take 1 tablet (10 mg total) by mouth once daily. 90 tablet 11    apixaban (ELIQUIS) 5 mg Tab Take 1 tablet (5 mg total) by mouth 2 (two) times daily. 180 tablet 11    atorvastatin (LIPITOR) 20 MG tablet Take 1 tablet (20 mg total) by mouth once daily. 90 tablet 11    B-complex with vitamin C (Z-BEC OR EQUIV) tablet Take 1 tablet by mouth once daily.       CALCIUM CITRATE-VITAMIN D2 ORAL Take 1 tablet by mouth once daily.       citalopram (CELEXA) 10 MG tablet Take 1 tablet (10 mg total) by mouth once daily. 90 tablet 11    dextran 70-hypromellose (TEARS) ophthalmic solution Place 1 drop into the right eye every 6 (six) hours. 30 mL 0    ferrous gluconate (FERGON) 324 MG tablet TAKE 1 TABLET (324 MG TOTAL) BY MOUTH DAILY WITH BREAKFAST. 90 tablet 3    furosemide (LASIX) 80 MG tablet Take 1 tablet (80 mg total) by mouth 2 (two) times daily. 180 tablet 11    gabapentin (NEURONTIN) 300 MG capsule Take 2 capsules (600 mg total) by mouth 3 (three) times daily. 540 capsule 2    LIDOcaine-prilocaine (EMLA) cream Apply topically as needed (prior to port access). 30 g 0    lisinopriL (PRINIVIL,ZESTRIL) 40 MG tablet Take 1 tablet (40 mg total) by mouth once daily. 90 tablet 11    multivitamin with minerals tablet Take 1 tablet by mouth once daily.       oxyCODONE-acetaminophen (PERCOCET) 5-325 mg per tablet Take 1 tablet by mouth every 8 (eight) hours as needed for Pain. 90 tablet 0    pantoprazole (PROTONIX) 40 MG tablet Take 1 tablet (40 mg total) by mouth once daily. 90 tablet 11     semaglutide (OZEMPIC) 1 mg/dose (4 mg/3 mL) Inject 1 mg into the skin every 7 days. 9 each 11     No current facility-administered medications for this visit.     Facility-Administered Medications Ordered in Other Visits   Medication Dose Route Frequency Provider Last Rate Last Admin    0.9%  NaCl infusion   Intravenous Continuous Tevin Clark MD 0 mL/hr at 01/13/22 1055 New Bag at 03/05/24 1000    0.9%  NaCl infusion   Intravenous Continuous Kevin Carballo MD        sodium chloride 0.9% flush 10 mL  10 mL Intravenous PRN Kevin Carballo MD           Past Medical History:   Diagnosis Date    YOUNG (acute kidney injury) 10/15/2021    Anemia     2018    Arthritis     BMI 60.0-69.9, adult     Cataract     Chronic diastolic congestive heart failure 01/27/2021    Deep vein thrombosis     Depression     Dry eye syndrome     DVT of deep femoral vein, right 05/29/2023    Gastric adenocarcinoma 05/25/2021    GERD (gastroesophageal reflux disease)     Glaucoma suspect     History of gastric ulcer     History of psychiatric hospitalization     Hx of psychiatric care     Celexa, no recall of charted Effexor, Lexapro, Desyrel prescriptions    Hyperlipidemia     Hypertension     Hypothyroidism     Morbid obesity     Neuromuscular disorder     Sleep apnea     Thyroid disease      Past Surgical History:   Procedure Laterality Date    APPENDECTOMY      CARDIAC SURGERY      CATARACT EXTRACTION W/  INTRAOCULAR LENS IMPLANT Bilateral     MFIOL OU    CORONARY ANGIOGRAPHY Bilateral 02/24/2021    Procedure: ANGIOGRAM, CORONARY ARTERY;  Surgeon: Cresencio Ridley MD;  Location: [facility] CATH LAB;  Service: Cardiology;  Laterality: Bilateral;  Covid test needed - ok per Danay SSCU. Pt. w/o transportation or family    ENDOSCOPIC ULTRASOUND OF UPPER GASTROINTESTINAL TRACT N/A 03/17/2021    Procedure: ULTRASOUND, UPPER GI TRACT, ENDOSCOPIC;  Surgeon: Gerald Anaya MD;  Location: [facility] ENDO (2ND FLR);  Service: Endoscopy;   Laterality: N/A;  Pt unable to get a ride for swab. Will do rapid test at 8:30 - ttr    ENDOSCOPIC ULTRASOUND OF UPPER GASTROINTESTINAL TRACT N/A 01/13/2022    Procedure: ULTRASOUND, UPPER GI TRACT, ENDOSCOPIC;  Surgeon: Kevin Carballo MD;  Location: [facility] ENDO (2ND FLR);  Service: Endoscopy;  Laterality: N/A;  blood thinner approval received, see telephone encounter 1/7/22-BB  rapid  instructions given verbally and sent to address on file in pt's chart-BB    ENDOSCOPIC ULTRASOUND OF UPPER GASTROINTESTINAL TRACT N/A 10/11/2022    Procedure: ULTRASOUND, UPPER GI TRACT, ENDOSCOPIC;  Surgeon: Dequan Ridley MD;  Location: [facility] ENDO (2ND FLR);  Service: Endoscopy;  Laterality: N/A;    ENDOSCOPIC ULTRASOUND OF UPPER GASTROINTESTINAL TRACT N/A 1/24/2024    Procedure: ULTRASOUND, UPPER GI TRACT, ENDOSCOPIC;  Surgeon: Kevin Carballo MD;  Location: [facility] SAM (2ND FLR);  Service: Endoscopy;  Laterality: N/A;    ENDOSCOPIC ULTRASOUND OF UPPER GASTROINTESTINAL TRACT N/A 3/5/2024    Procedure: ULTRASOUND, UPPER GI TRACT, ENDOSCOPIC;  Surgeon: Kevin Carballo MD;  Location: [facility] SAM (2ND FLR);  Service: Endoscopy;  Laterality: N/A;  1/25 portal instr-Repeat theEUS at the next available appointment because the preparation was poor. 48hrs of liquid. Ozempic 7 day hold-eliquis approved Dr. Pelaez TE 12/12/2023-tt  2/28-precall complete-pt verbalized udnerstanding of holding Oz    ESOPHAGOGASTRODUODENOSCOPY N/A 02/05/2021    Procedure: EGD (ESOPHAGOGASTRODUODENOSCOPY);  Surgeon: Matthew Hernadez MD;  Location: [facility] ENDO (2ND FLR);  Service: Endoscopy;  Laterality: N/A;  BMI-58    Wt: 361#     COVID test at [facility] on 2/2-GT    ESOPHAGOGASTRODUODENOSCOPY N/A 03/17/2021    Procedure: EGD (ESOPHAGOGASTRODUODENOSCOPY);  Surgeon: Gerald Anaya MD;  Location: [facility] ENDO (2ND FLR);  Service: Endoscopy;  Laterality: N/A;    ESOPHAGOGASTRODUODENOSCOPY N/A 05/25/2021    Procedure: EGD (ESOPHAGOGASTRODUODENOSCOPY);  Surgeon: Kevin SINGH  MD Haris;  Location: [facility] SAM (2ND FLR);  Service: Endoscopy;  Laterality: N/A;  ESD 2 hours  Full COVID vaccination on file. ttr    ESOPHAGOGASTRODUODENOSCOPY N/A 01/13/2022    Procedure: EGD (ESOPHAGOGASTRODUODENOSCOPY);  Surgeon: Kevin Carballo MD;  Location: [facility] SAM (2ND FLR);  Service: Endoscopy;  Laterality: N/A;  blood thinner approval, see telephone encounter 1/7/22-BB  rapid  instructions given verbally and sent to address on file in pt's chart-BB    ESOPHAGOGASTRODUODENOSCOPY N/A 10/11/2022    Procedure: EGD (ESOPHAGOGASTRODUODENOSCOPY);  Surgeon: Dequan Ridley MD;  Location: [facility] SAM (2ND FLR);  Service: Endoscopy;  Laterality: N/A;  She is do for EGD/EUS now. Personal history of gastric cancer. Ca Coats #[id number].   Thanks,   Kevin Carballo. MD    Pt is fully vaccinated-DS  9/14/22-Approval to hold Eliquis rec'd from Dr. Pelaez (see telephone encounter 9/14/22)-DS  9/14/22-Ins    ESOPHAGOGASTRODUODENOSCOPY N/A 1/24/2024    Procedure: EGD (ESOPHAGOGASTRODUODENOSCOPY);  Surgeon: Kevin Carballo MD;  Location: [facility] SAM (2ND FLR);  Service: Endoscopy;  Laterality: N/A;  12/8/23: instructions sent via portal. eliquis approval from MD Pelaez in telephone encounter (12/12/23) -GD  1/9-precall complete-MS    ESOPHAGOGASTRODUODENOSCOPY N/A 3/5/2024    Procedure: EGD (ESOPHAGOGASTRODUODENOSCOPY);  Surgeon: Kevin Carballo MD;  Location: [facility] SAM (2ND FLR);  Service: Endoscopy;  Laterality: N/A;    EYE SURGERY      HYSTEROSCOPIC POLYPECTOMY OF UTERUS  1/10/2024    Procedure: POLYPECTOMY, UTERUS, HYSTEROSCOPIC;  Surgeon: Pina Pickens MD;  Location: [facility];  Service: OB/GYN;;    HYSTEROSCOPY WITH DILATION AND CURETTAGE OF UTERUS N/A 1/10/2024    Procedure: HYSTEROSCOPY, WITH DILATION AND CURETTAGE OF UTERUS;  Surgeon: Pina Pickens MD;  Location: [facility];  Service: OB/GYN;  Laterality: N/A;    INJECTION OF ANESTHETIC AGENT AROUND NERVE Left 07/10/2018    Procedure: BLOCK,  NERVE;  Surgeon: Samantha Vidal MD;  Location: [facility] PAIN MGT;  Service: Pain Management;  Laterality: Left;  Genicular     INJECTION OF ANESTHETIC AGENT AROUND NERVE Left 07/19/2019    Procedure: Block, Nerve NERVE BLOCK GENICULAR WITH PHENOL 6%;  Surgeon: Samantha Vidal MD;  Location: [facility] PAIN MGT;  Service: Pain Management;  Laterality: Left;  NEEDS CONSENT    INSERTION OF TUNNELED CENTRAL VENOUS CATHETER (CVC) WITH SUBCUTANEOUS PORT N/A 07/09/2021    Procedure: INSERTION, PORT-A-CATH;  Surgeon: Mario Paz MD;  Location: [facility] CATH LAB;  Service: Radiology;  Laterality: N/A;  PORT PLACEMENT    RADIOFREQUENCY ABLATION Left 06/19/2020    Procedure: RADIOFREQUENCY ABLATION LEFT GENICULAR;  Surgeon: Tevin Clark MD;  Location: [facility] PAIN MGT;  Service: Pain Management;  Laterality: Left;  Left Genicular RFA    RADIOFREQUENCY ABLATION Left 01/22/2021    Procedure: RADIOFREQUENCY ABLATION LEFT GENICULAR;  Surgeon: Tevin Clark MD;  Location: [facility] PAIN MGT;  Service: Pain Management;  Laterality: Left;    RADIOFREQUENCY ABLATION Left 07/30/2021    Procedure: RADIOFREQUENCY ABLATION, GENICULAR COOLED NEED CONSENT;  Surgeon: Tevin Clark MD;  Location: [facility] PAIN MGT;  Service: Pain Management;  Laterality: Left;    RADIOFREQUENCY ABLATION Left 04/22/2022    Procedure: RADIOFREQUENCY ABLATION, GENICULAR COOLED , LEFT;  Surgeon: Tevin Clark MD;  Location: [facility] PAIN MGT;  Service: Pain Management;  Laterality: Left;    RADIOFREQUENCY ABLATION Left 12/23/2022    Procedure: RADIOFREQUENCY ABLATION LEFT GENICULAR COOLED CLEARED TO HOLD ELIQUIS 3 DAYS  NEEDS CONSENT;  Surgeon: Tevin Clark MD;  Location: [facility] PAIN MGT;  Service: Pain Management;  Laterality: Left;    TONSILLECTOMY       Family History       Problem Relation (Age of Onset)    No Known Problems Mother, Father          Social History     Socioeconomic History    Marital status: Single    Number of children: 2   Occupational History      Comment: retired  and cook   Tobacco Use    Smoking status: Former     Current packs/day: 0.00     Average packs/day: 1 pack/day for 53.9 years (53.7 ttl pk-yrs)     Types: Cigarettes     Start date:      Quit date: 2023     Years since quittin.6    Smokeless tobacco: Never    Tobacco comments:     currently smoking <5 cigarettes most days   Substance and Sexual Activity    Alcohol use: Yes     Alcohol/week: 1.0 standard drink of alcohol     Types: 1 Glasses of wine per week     Comment: rarely    Drug use: No    Sexual activity: Not Currently     Partners: Male   Social History Narrative    2 children (dtr in area, son in [place]) and she has 3 grandkids (in [place]),    lives alone. Prev enrike and julianna    Originally from [facility]     Social Determinants of Health     Financial Resource Strain: Medium Risk (10/31/2023)    Overall Financial Resource Strain (CARDIA)     Difficulty of Paying Living Expenses: Somewhat hard   Food Insecurity: No Food Insecurity (10/31/2023)    Hunger Vital Sign     Worried About Running Out of Food in the Last Year: Never true     Ran Out of Food in the Last Year: Never true   Transportation Needs: Unmet Transportation Needs (10/31/2023)    PRAPARE - Transportation     Lack of Transportation (Medical): Yes     Lack of Transportation (Non-Medical): Yes   Physical Activity: Inactive (10/31/2023)    Exercise Vital Sign     Days of Exercise per Week: 0 days     Minutes of Exercise per Session: 0 min   Stress: Stress Concern Present (10/31/2023)    [employer] - Occupational Stress Questionnaire     Feeling of Stress : To some extent   Housing Stability: Low Risk  (10/31/2023)    Housing Stability Vital Sign     Unable to Pay for Housing in the Last Year: No     Number of Places Lived in the Last Year: 1     Unstable Housing in the Last Year: No       Review of Systems   All other systems reviewed and are negative.    OBJECTIVE:      Vital Signs     There is no height or weight on file to calculate BMI.    Neurosurgery Physical Exam  General: no acute distress  Head: Non-traumatic, normocephalic  Eyes: Pupils equal, EOMI  Neck: Supple, normal ROM, no tenderness to palpation  CVS: Normal rate and rhythm, distal pulses present  Pulm: Symmetric expansion, no respiratory distress  GI: Abdomen nondistended, nontender    MSK: Moves all extremities without restriction, atraumatic  Skin: Dry, intact  Psych: Normal thought content and cognition    Neuro:  Alert, awake, oriented, to self, place, time  Language:  Speech is fluent, goal directed without any noted dysarthria or aphasia    Cranial nerves:    CNII-XII: Intact on fine exam,   Pupils equal round react to light,   Extraocular muscles are intact  V1 to V3 is intact to light touch,   no facial asymmetry,   Hearing is intact to finger rub and voice  tongue/uvula/palate midline,   shoulder shrug equal,     No pronator drift    Extremities:  Motor:  Upper Extremity    Deltoid Triceps Biceps Wrist  Extension Wrist  Flexion Interosseous   R 5/5 5/5 5/5 5/5 5/5 5/5   L 5/5 5/5 5/5 5/5 5/5 5/5       Thumb   Abduction Thumb  ADDuction Finger  Flexion Finger  Extension     R 5/5 5/5 5/5 5/5     L 5/5 5/5 5/5 5/5        Lower Extremity     Iliopsoas Quadriceps Hamstring Plantarflexion Dorsiflexion EHL   R 5/5 5/5 5/5 5/5 5/5 5/5   L 5/5 5/5 5/5 5/5 5/5 5/5       Reflexes:     DTR: 2+ biceps    2+ triceps   2+ brachioradialis   2+ patellar  2+ Achilles     Rob's: Negative     Babinski's: Negative     Clonus: Negative     Sensory:      Sensation intact to light touch, temperature sensation, vibration, pinprick     Coordination:      Coordination intact throughout, no dysmetria, normal rapid alternating movements, no dysdiadochokinesia  Cerebellar:  Normal finger-to-nose, normal heel-to-shin  Cervical spine:  No midline cervical tenderness, negative Lhermitte, negative Spurling  Thoracic spine:  No  midline thoracic tenderness  Lumbar spine:  No midline lumbar tenderness     Diagnostic Results:  I personally reviewed the patient's Diagnostic Imaging.     IR Angiogram Carotid Cerebral Bilateral 06/06/24  There is highly dysplastic basilar apex aneurysm, measuring neck 4.92 mm, height 7.68 m and width as 9.58 mm.     Left anterior choroidal ICA aneurysm measuring as neck 2.6 x 5 mm and height as 6.34 mm, image body with of 4.22 mm.     Left MCA Bifurcation measuring neck 7.29 mm x height 7.6 a mm and width as 9.23 mm.     Small left ophthalmic artery aneurysm, 2.3 mm base by 1.7 mm height     CTA Head & Neck 03/17/24  CTA head: Redemonstration of 3 intracranial aneurysms involving left anterior choroidal ICA, left M2 MCA, and the basilar tip overall unchanged.     CTA neck: No significant arterial stenosis throughout the neck.     CT head: No evidence for acute intracranial hemorrhage or definite abnormal parenchymal enhancement.     MRA Brain W WO Cont 03/17/24  MRA head: Globular left M2 MCA aneurysm, left anterior choroidal aneurysm, and basilar tip aneurysms again seen relatively stable     Vessel wall imaging: Please note there is abnormal wall enhancement associated with the left anterior choroidal aneurysm as well as a more focal punctate area of a questioned abnormal wall enhancement associated with the left MCA aneurysm     Wall enhancement associated with the basilar tip aneurysm.    ASSESSMENT/PLAN:   73-year-old female with 55 pack-year history of smoking and several aneurysms (dysplastic basilar apex aneurysm, left anterior choroidal ICA aneurysm, left MCA bifurcation, and small left ophthalmic artery aneurysm). Abnormal wall enhancement associated with the left anterior choroidal ICA aneurysm, left MCA aneurysm, and basilar tip aneurysm.    Patient presents with no focal neurological deficits here in clinic.  Discussed imaging results of angiogram revealing dysplastic basilar apex aneurysm, left  anterior choroidal ICA inferiorly pointing aneurysm, left MCA bifurcation, and small left ophthalmic artery aneurysm.  Discussed imaging results of CTA Head and Neck revealing 6 mm basilar apex aneurysm, 7 mm left anterior choroidal artery aneurysm, 12 mm left MCA bifurcation aneurysm. No significant arterial stenosis throughout the neck.  Discussed imaging results of MRA Brain revealing globular left M2 MCA aneurysm, left anterior choroidal ICA aneurysm and basilar tip aneurysms, relatively stable. Noted abnormal wall enhancement associated with the left anterior choroidal ICA aneurysm as well as a more focal punctate area of abnormal wall enhancement associated with the left MCA aneurysm. Wall enhancement associated with the basilar tip aneurysm as well.  After discussion with the patient, I recommend surgical treatment of all aneurysms. I discussed this case with my partner, Dr. Shankar Gannon, and I believe it would be best to first treat the left MCA and left anterior choroidal aneurysms via open surgery, followed by open surgery treatment of the ophthalmic artery aneurysm, then lastly endovascular surgical treatment (WEB) of the basilar aneurysm. I have discussed the risks/benefits, indications, and alternatives for the proposed procedure in detail. The patient and I also discussed the importance of smoking cessation d/t the negative effects it has on the patient's condition and post-operative recovery. Patient will f/u with her PCP for surgery clearance. I have answered all of their questions and patient wish to proceed with this plan. We will schedule patient.      Thank you so very much for allowing me to participate in the care of this patient.  Please feel free to call any questions, comments, or concerns.     Sejal Simon MD,MSc  Department of Neurosurgery   Department of Radiology  Department of Neurology  [facility]    [facility]  School   [employer] [facility] [employer]     Total time spent in counseling and discussion about further management options including relevant lab work, treatment,  prognosis, medications and intended side effects was more than 60 minutes. More than 50 % of the time was spent in counseling and coordination of care.  I spent a total of 60 minutes on the day of the visit.This includes face to face time and non-face to face time preparing to see the patient (eg, review of tests), Obtaining and/or reviewing separately obtained history, Documenting clinical information in the electronic or other health record, Independently interpreting resultsand communicating results to the patient/family/caregiver, or Care coordination.     Scribe Attestation  I, Dr. Sejal Simon personally performed the services described in this documentation. All medical record entries made by the scribe, Myrna Pappas, were at my direction and in my presence. I have reviewed the chart and agree that the record reflects my personal performance and is accurate and complete.

## 2024-08-07 ENCOUNTER — TELEPHONE (OUTPATIENT)
Dept: INTERNAL MEDICINE | Facility: CLINIC | Age: 74
End: 2024-08-07
Payer: MEDICARE

## 2024-08-23 ENCOUNTER — TELEPHONE (OUTPATIENT)
Dept: PULMONOLOGY | Facility: CLINIC | Age: 74
End: 2024-08-23

## 2024-08-23 ENCOUNTER — HOSPITAL ENCOUNTER (OUTPATIENT)
Dept: RADIOLOGY | Facility: OTHER | Age: 74
Discharge: HOME | End: 2024-08-23
Attending: INTERNAL MEDICINE
Payer: MEDICARE

## 2024-08-23 DIAGNOSIS — R91.8 PULMONARY NODULES/LESIONS, MULTIPLE: ICD-10-CM

## 2024-08-23 PROCEDURE — 71250 CT THORAX DX C-: CPT | Mod: TC,HCNC

## 2024-08-23 PROCEDURE — 71250 CT THORAX DX C-: CPT | Mod: 26,HCNC,, | Performed by: RADIOLOGY

## 2024-08-23 NOTE — TELEPHONE ENCOUNTER
Largest nodule is peripheral and would best be biopsied with percutaneous biopsy.  Will discuss with Dr Motley next best step.  Overall, nodules are stable.  Could also monitor with follow up CT or PET for the 9mm nodule.   Called and discussed

## 2024-08-26 ENCOUNTER — TELEPHONE (OUTPATIENT)
Dept: HEMATOLOGY/ONCOLOGY | Facility: CLINIC | Age: 74
End: 2024-08-26
Payer: MEDICARE

## 2024-08-26 DIAGNOSIS — R91.8 PULMONARY NODULES: Primary | ICD-10-CM

## 2024-08-27 ENCOUNTER — TELEPHONE (OUTPATIENT)
Dept: PAIN MEDICINE | Facility: CLINIC | Age: 74
End: 2024-08-27
Payer: MEDICARE

## 2024-08-27 NOTE — TELEPHONE ENCOUNTER
----- Message from Arron Wu sent at 8/27/2024  9:42 AM CDT -----  Who Called:        Refill or New Rx: refill         RX Name and Strength:   oxyCODONE-acetaminophen (PERCOCET) 5-325 mg per tablet   ([facility])    gabapentin (NEURONTIN) 300 MG capsule ( [facility] - [facility] [address])           Is this a 30 day or 90 day RX        Preferred Pharmacy with phone number: pharmacy is listed next to medication  / going to 2 different pharmacy         Local or Mail Order:          Would the patient rather a call back or a response via [facility]? Call back         Best Call Back Number:        Additional Information:

## 2024-08-29 DIAGNOSIS — G89.4 CHRONIC PAIN SYNDROME: ICD-10-CM

## 2024-08-29 RX ORDER — OXYCODONE AND ACETAMINOPHEN 5; 325 MG/1; MG/1
1 TABLET ORAL EVERY 8 HOURS PRN
Qty: 90 TABLET | Refills: 0 | Status: SHIPPED | OUTPATIENT
Start: 2024-08-29 | End: 2024-09-29

## 2024-08-29 NOTE — TELEPHONE ENCOUNTER
Patient requesting refill on oxycodone  Last office visit 04-16-24   shows last refill on 08/01/2024  Patient does have a pain contract on file with [facility]  Patient last UDS 04-16-24 was consistent with current therapy     CODEINE   Not Detected Not Detected Not Detected   Comment: INTERPRETIVE INFORMATION: Codeine, U  Positive Cutoff: 40 ng/mL  Methodology: Mass Spectrometry   MORPHINE   Not Detected Not Detected Not Detected   Comment: INTERPRETIVE INFORMATION:Morphine, U  Positive Cutoff: 20 ng/mL  Methodology: Mass Spectrometry   6-ACETYLMORPHINE   Not Detected Not Detected Not Detected   Comment: INTERPRETIVE INFORMATION:6-acetylmorphine, U  Positive Cutoff: 20 ng/mL  Methodology: Mass Spectrometry   OXYCODONE   Present Present Present   Comment: INTERPRETIVE INFORMATION:Oxycodone, U  Positive Cutoff: 40 ng/mL  Methodology: Mass Spectrometry   NOROYXCODONE   Present Present Present   Comment: INTERPRETIVE INFORMATION:Noroxycodone, U  Positive Cutoff: 100 ng/mL  Methodology: Mass Spectrometry   OXYMORPHONE   Present Present Not Detected   Comment: INTERPRETIVE INFORMATION:Oxymorphone, U  Positive Cutoff: 40 ng/mL  Methodology: Mass Spectrometry   NOROXYMORPHONE   Present Present Not Detected   Comment: INTERPRETIVE INFORMATION:Noroxymorphone, U  Positive Cutoff: 100 ng/mL  Methodology: Mass Spectrometry   HYDROCODONE   Not Detected Not Detected Not Detected   Comment: INTERPRETIVE INFORMATION:Hydrocodone, U  Positive Cutoff: 40 ng/mL  Methodology: Mass Spectrometry   NORHYDROCODONE   Not Detected Not Detected Not Detected   Comment: INTERPRETIVE INFORMATION:Norhydrocodone, U  Positive Cutoff: 100 ng/mL  Methodology: Mass Spectrometry   HYDROMORPHONE   Not Detected Not Detected Not Detected   Comment: INTERPRETIVE INFORMATION:Hydromorphone, U  Positive Cutoff: 20 ng/mL  Methodology: Mass Spectrometry   BUPRENORPHINE   Not Detected Not Detected Not Detected   Comment:  INTERPRETIVE INFORMATION:Buprenorphine, U  Positive Cutoff: 5 ng/mL  Methodology: Mass Spectrometry   NORUBPRENORPHINE   Not Detected Not Detected Not Detected   Comment: INTERPRETIVE INFORMATION:Norbuprenorphine, U  Positive Cutoff: 20 ng/mL  Methodology: Mass Spectrometry   FENTANYL   Not Detected Not Detected Not Detected   Comment: INTERPRETIVE INFORMATION:Fentanyl, U  Positive Cutoff: 2 ng/mL  Methodology: Mass Spectrometry   NORFENTANYL   Not Detected Not Detected Not Detected   Comment: INTERPRETIVE INFORMATION:Norfentanyl, U  Positive Cutoff: 2 ng/mL  Methodology: Mass Spectrometry   MEPERIDINE METABOLITE   Not Detected Not Detected Not Detected   Comment: INTERPRETIVE INFORMATION:Meperidine metabolite, U  Positive Cutoff: 50 ng/mL  Methodology: Mass Spectrometry   TAPENTADOL   Not Detected Not Detected Not Detected   Comment: INTERPRETIVE INFORMATION:Tapentadol, U  Positive Cutoff: 100 ng/mL  Methodology: Mass Spectrometry   TAPENTADOL-O-SULF   Not Detected Not Detected Not Detected   Comment: INTERPRETIVE INFORMATION:Tapentadol-o-Sulf, U  Positive Cutoff: 200 ng/mL  Methodology: Mass Spectrometry   METHADONE   Negative Not Detected Not Detected   Comment: Presumptive negative by immunoassay. Testing by mass  spectrometry is available on request.  INTERPRETIVE INFORMATION: Methadone Screen, U  Positive Cutoff: 150 ng/mL  Methodology: Immunoassay   TRAMADOL   Negative Not Detected Not Detected   Comment: Presumptive negative by immunoassay. Testing by mass  spectrometry is available on request.  INTERPRETIVE INFORMATION:Tramadol Screen, U  Positive Cutoff: 100 ng/mL  Methodology: Immunoassay   AMPHETAMINE   Not Detected Not Detected Not Detected   Comment: INTERPRETIVE INFORMATION:Amphetamine, U  Positive Cutoff: 50 ng/mL  Methodology: Mass Spectrometry   METHAMPHETAMINE   Not Detected Not Detected Not Detected   Comment: INTERPRETIVE INFORMATION:Methamphetamine, U  Positive Cutoff: 200 ng/mL  Methodology:  Mass Spectrometry   MDMA- ECSTASY   Not Detected Not Detected Not Detected   Comment: INTERPRETIVE INFORMATION:MDMA, U  Positive Cutoff: 200 ng/mL  Methodology: Mass Spectrometry   MDA   Not Detected Not Detected Not Detected   Comment: INTERPRETIVE INFORMATION:MDA, U  Positive Cutoff: 200 ng/mL  Methodology: Mass Spectrometry   MDEA- Genoveva   Not Detected Not Detected Not Detected   Comment: INTERPRETIVE INFORMATION:MDEA, U  Positive Cutoff: 200 ng/mL  Methodology: Mass Spectrometry   METHYLPHENIDATE   Not Detected Not Detected Not Detected   Comment: INTERPRETIVE INFORMATION:Methylphenidate, U  Positive Cutoff: 100 ng/mL  Methodology: Mass Spectrometry   PHENTERMINE   Not Detected Not Detected Not Detected   Comment: INTERPRETIVE INFORMATION:Phentermine, U  Positive Cutoff: 100 ng/mL  Methodology: Mass Spectrometry   BENZOYLECGONINE   Negative Not Detected Not Detected   Comment: Presumptive negative by immunoassay. Testing by mass  spectrometry is available on request.  INTERPRETIVE INFORMATION:Cocaine Screen, U  Positive Cutoff: 150 ng/mL  Methodology: Immunoassay   ALPRAZOLAM   Not Detected Not Detected Not Detected   Comment: INTERPRETIVE INFORMATION:Alprazolam, U  Positive Cutoff: 40 ng/mL  Methodology: Mass Spectrometry   ALPHA-OH-ALPRAZOLAM   Not Detected Not Detected Not Detected   Comment: INTERPRETIVE INFORMATION:Alpha-OH-Alprazolam, U  Positive Cutoff: 20 ng/mL  Methodology: Mass Spectrometry   CLONAZEPAM   Not Detected Not Detected Not Detected   Comment: INTERPRETIVE INFORMATION:Clonazepam, U  Positive Cutoff: 20 ng/mL  Methodology: Mass Spectrometry   7-AMINOCLONAZEPAM   Not Detected Not Detected Not Detected   Comment: INTERPRETIVE INFORMATION:7-Aminoclonazepam, U  Positive Cutoff: 40 ng/mL  Methodology: Mass Spectrometry   DIAZEPAM   Not Detected Not Detected Not Detected   Comment: INTERPRETIVE INFORMATION:Diazepam, U  Positive Cutoff: 50 ng/mL  Methodology: Mass Spectrometry   NORDIAZEPAM   Not  Detected Not Detected Not Detected   Comment: INTERPRETIVE INFORMATION:Nordiazepam, U  Positive Cutoff: 50 ng/mL  Methodology: Mass Spectrometry   OXAZEPAM   Not Detected Not Detected Not Detected   Comment: INTERPRETIVE INFORMATION:Oxazepam, U  Positive Cutoff: 50 ng/mL  Methodology: Mass Spectrometry   TEMAZEPAM   Not Detected Not Detected Not Detected   Comment: INTERPRETIVE INFORMATION:Temazepam, U  Positive Cutoff: 50 ng/mL  Methodology: Mass Spectrometry   Lorazepam   Not Detected Not Detected Not Detected   Comment: INTERPRETIVE INFORMATION:Lorazepam, U  Positive Cutoff: 60 ng/mL  Methodology: Mass Spectrometry   MIDAZOLAM   Not Detected Not Detected Not Detected   Comment: INTERPRETIVE INFORMATION:Midazolam, U  Positive Cutoff: 20 ng/mL  Methodology: Mass Spectrometry   ZOLPIDEM   Not Detected Not Detected Not Detected   Comment: INTERPRETIVE INFORMATION:Zolpidem, U  Positive Cutoff: 20 ng/mL  Methodology: Mass Spectrometry   BARBITURATES   Negative Not Detected Not Detected   Comment: Presumptive negative by immunoassay. Testing by mass  spectrometry is available on request.  INTERPRETIVE INFORMATION:Barbiturates Screen, U  Positive Cutoff: 200 ng/mL  Methodology: Immunoassay   Creatinine, Urine 20.0 - 400.0 mg/dL 102.8 205.6 102.0   ETHYL GLUCURONIDE   Negative Present Not Detected   Comment: Presumptive negative by immunoassay. Testing by mass  spectrometry is available on request.  INTERPRETIVE INFORMATION:Ethyl Glucuronide Screen, U  Positive Cutoff: 500 ng/mL  Methodology: Immunoassay   MARIJUANA METABOLITE   Negative Not Detected Not Detected   Comment: Presumptive negative by immunoassay. Testing by mass  spectrometry is available on request.  INTERPRETIVE INFORMATION: THC (Cannabinoids) Screen, U  Positive Cutoff: 50 ng/mL  Methodology: Immunoassay   PCP   Negative Not Detected Not Detected   Comment: Presumptive negative by immunoassay. Testing by mass  spectrometry is available on  request.  INTERPRETIVE INFORMATION:Phencyclidine Screen, U  Positive Cutoff: 25 ng/mL  Methodology: Immunoassay   CARISOPRODOL   Negative Not Detected CM Not Detected CM   Comment: Presumptive negative by immunoassay. Testing by mass  spectrometry is available on request.  INTERPRETIVE INFORMATION: Carisoprodol Screen, U  Positive Cutoff: 100 ng/mL  Methodology: Immunoassay  The carisoprodol immunoassay has cross-reactivity to  carisoprodol and meprobamate.   Naloxone   Not Detected Not Detected     Comment: INTERPRETIVE INFORMATION:Naloxone, U  Positive Cutoff: 100 ng/mL  Methodology: Mass Spectrometry   Gabapentin   Present Present     Comment: INTERPRETIVE INFORMATION:Gabapentin, U  Positive Cutoff: 3,000 ng/mL  Methodology: Mass Spectrometry   Pregabalin   Not Detected Not Detected     Comment: INTERPRETIVE INFORMATION:Pregabalin, U  Positive Cutoff: 3,000 ng/mL  Methodology: Mass Spectrometry   Alpha-OH-Midazolam   Not Detected Not Detected     Comment: INTERPRETIVE INFORMATION:Alpha-OH-Midazolam, U  Positive Cutoff: 20 ng/mL  Methodology: Mass Spectrometry   Zolpidem Metabolite   Not Detected Not Detected     Comment: INTERPRETIVE INFORMATION:Zolpidem Metabolite, U  Positive Cutoff: 100 ng/mL  Methodology: Mass Spectrometry   PAIN MANAGEMENT DRUG PANEL   See Below See Below CM

## 2024-09-05 ENCOUNTER — TELEPHONE (OUTPATIENT)
Dept: PAIN MEDICINE | Facility: CLINIC | Age: 74
End: 2024-09-05
Payer: MEDICARE

## 2024-09-06 ENCOUNTER — OFFICE VISIT (OUTPATIENT)
Dept: PAIN MEDICINE | Facility: CLINIC | Age: 74
End: 2024-09-06
Payer: MEDICARE

## 2024-09-06 VITALS
OXYGEN SATURATION: 99 % | BODY MASS INDEX: 42.81 KG/M2 | HEART RATE: 97 BPM | TEMPERATURE: 98 F | WEIGHT: 265.19 LBS | SYSTOLIC BLOOD PRESSURE: 117 MMHG | DIASTOLIC BLOOD PRESSURE: 82 MMHG

## 2024-09-06 DIAGNOSIS — Z51.81 ENCOUNTER FOR MONITORING OPIOID MAINTENANCE THERAPY: ICD-10-CM

## 2024-09-06 DIAGNOSIS — C16.9 GASTRIC ADENOCARCINOMA: ICD-10-CM

## 2024-09-06 DIAGNOSIS — M25.562 CHRONIC PAIN OF LEFT KNEE: ICD-10-CM

## 2024-09-06 DIAGNOSIS — Z79.891 ENCOUNTER FOR MONITORING OPIOID MAINTENANCE THERAPY: ICD-10-CM

## 2024-09-06 DIAGNOSIS — M17.12 PRIMARY OSTEOARTHRITIS OF LEFT KNEE: ICD-10-CM

## 2024-09-06 DIAGNOSIS — G89.29 CHRONIC PAIN OF LEFT KNEE: ICD-10-CM

## 2024-09-06 DIAGNOSIS — G89.4 CHRONIC PAIN DISORDER: Primary | ICD-10-CM

## 2024-09-06 PROCEDURE — 99999 PR PBB SHADOW E&M-EST. PATIENT-LVL IV: CPT | Mod: PBBFAC,,, | Performed by: NURSE PRACTITIONER

## 2024-09-06 RX ORDER — GABAPENTIN 300 MG/1
600 CAPSULE ORAL 3 TIMES DAILY
Qty: 540 CAPSULE | Refills: 2 | Status: SHIPPED | OUTPATIENT
Start: 2024-09-06

## 2024-09-06 NOTE — PROGRESS NOTES
Chronic Pain- Established Visit:        Interval History 9/6/2024:  The patient returns to clinic today for follow up of knee pain. She is s/p left knee Synvisc One on 4/30/2024. She reports 50% relief of her pain. She continues to report intermittent knee pain. Her pain is worse with prolonged standing and walking. She is potentially having surgery regarding the cerebral aneurysms. She is unsure of the date. She does endorse headaches and dizziness. She is taking Gabapentin. She also takes Percocet as needed with benefit. She denies any adverse effects. She denies any other health changes. Her pain today is 0/10.    Interval History 4/16/2024:  Patient returns to clinic for follow up of L knee pain. Patient states that she was supposed to have knee surgery with Dr. Hensley on Feb 7, 2024, however procedure was aborted due to multiple cerebral aneurysms. She is now following up with Dr. Washington, neurointensivist, for further workup with plan for angiogram under anesthesia in May. Patient reports L knee pain is the same, she reports continuing to take percocet 5/325mg TID and gabapentin, providing pain relief.  Plan for UDS at appointment today per previous note. Patient reports pain is 7/10 at time of visit.  She reports there were previous discussion for synvisc injection at previous pain management appointments however that was not completed. Per further review, patient has had previous genicular nerve RFA approximately 1 year ago, repeat RFA was planned however not approved by patient's insurance.    Interval History 11/01/2023:  72-year-old female that presents today for a follow-up appointment she is in a wheelchair accompanied by her daughter she is scheduled for a repeat Left genicular RFA with Dr. Mendiola  however this procedure was canceled due to her insurance denying the procedure. She is here to discuss other PM procedures to help with her chronic left knee pain.  Pain is described as aching and throbbing,  is worse when prolonged walking    Interval History 8/28/23 (Virtual/Dr. Olson)  72 year old female returns today in follow up. Previously a patient of Dr. Thacker but has since been a patient of Dr. Clark. Today is the first visit with Dr. Olson. Patient reports left knee pain that is persistent. She had left knee genicular nerve RFA with Dr. Clark 12/23/22. She reports 75-80% relief of her left knee pain for 7 months. Pain has gradually returned of similar character, location, description. No recent falls. No new weakness or sensation changes. We reviewed imaging which is consistent with medial joint space narrowing, which is where her pain is. She would like to repeat the RFA.     Interval History 5/1/2023:  The patient returns to clinic today for follow up of knee pain. She continues to report left knee pain. Her pain is worse with prolonged activity. She is taking Gabapentin. She also takes Percocet as needed with benefit and without adverse effects. She denies any other health changes. Her pain today is 0/10.    Interval History 1/13/2023:  The patient returns to clinic today for follow up of knee pain. She is s/p left genicular RFA on 12/23/2022. She reports 75-80% relief of her left knee pain. She reports intermittent knee pain, worse with prolonged activity. She continues to take Gabapentin. She continues to take Percocet as needed with benefit. She denies any adverse effects. She denies any other health changes. Her pain today is 0/10.    Interval History 11/16/2022:  The patient returns to clinic today for follow up of knee pain. She reports increased left knee pain over the last few weeks. Her pain is worse with prolonged standing, walking, and activity. She denies any right knee pain. She continues to follow up with Hem/Onc. She continues to report benefit with current medication regimen. She continues to take Gabapentin. She continues to take Percocet as needed with benefit and without adverse  effects. She denies any other health changes. Her pain today is 6/10.\    Interval History 8/16/2022:  The patient returns to clinic today for follow up of knee pain. She continues to report left knee pain. Her pain is much improved since RFA. Her pain is worse with prolonged standing and walking. She reports intermittent right lower leg pain, below her knee. She continues to follow up with Hem/Onc for gastric cancer. She continues to report benefit with Percocet. This allows her to perform her ADLs. She denies any adverse effects. She denies any other health changes. Her pain today is 0/10.    Interval History 5/16/2022:  The patient returns to clinic today for follow up of knee pain. She is s/p left genicular RFA on 4/22/2022. She reports 80% relief of her knee pain. She continues to report intermittent left knee pain. Her pain is worse with prolonged activity. She has completed radiation and chemotherapy. She is awaiting further testing to determine plan. She continues to take Percocet as needed with benefit and without adverse effects. She denies any other health changes. Her pain today is 2/10.    Interval History 3/21/2022:  The patient returns to clinic today for follow up of knee pain. She report increased left knee pain, worse with prolonged standing and walking. She did have repeat EGD with biopsies done. She is currently undergoing chemotherapy and radiation. She continues to take Percocet as needed with benefit and without adverse effects. She denies any other health changes. Her pain today is 0/10.    Interval History 12/28/2021:  The patient returns to clinic today for follow up of knee pain. She is s/p left knee Monovisc injection on 12/14/2021. She reports 70% relief of her left knee pain. She continues to report intermittent left knee pain, worse with standing and walking. She reports bilateral feet swelling, left greater than right. She has not seen her PCP. She has completed chemotherapy. She is  scheduled for EGD in January to monitor tumor size with possible resection. She will likely undergo chemotherapy again after the scope. She continues to take Gabapentin with benefit. She continues to take Percocet as needed with benefit and without adverse effects. She denies any other health changes. Her pain today is 0/10.    Interval History 11/9/2021:  The patient returns to clinic today for follow up of knee pain. She reports increased left knee pain, worse with standing and walking. Since last visit, she was admitted to the hospital for diarrhea and dehydration. She continues to undergo chemotherapy treatment for gastric cancer. She continues to take Gabapentin with benefit. She continues to take Percocet as needed with benefit and without adverse effects. She denies any other health changes. Her pain today is 7/10.       Interval History 9/14/2021:  The patient returns to clinic today for follow up of knee pain via audio visit. She is s/p left genicular RFA on 7/30/2021. She reports 50% relief of her knee pain. She continues to report left knee pain with prolonged standing and walking. Since last visit, she has developed a breast abscess. This was drained yesterday. She is on antibiotics. She continues to undergo chemotherapy for gastric cancer. She continues to take Gabapentin with benefit. She continues to take Percocet with benefit and without adverse effects. She denies any other health changes. Her pain today is 2/10.     Interval History 7/27/2021:  DARCY Coats presents to the clinic for a follow-up appointment. Since the last visit, DARCY Coats states the pain has been worsening. Current pain intensity is 7/10. Patient had an RFA in the left knee in January 2021. She had really good relief until about June. She was given an hyaulorinic injection at that time. She had good relief for a few weeks but now she is back to the same amount of pain as before that injection. She is having pain with  standing and walking. She is continuing to take percocet and it completely gets rid of her pain      Of note, patient was found to have gastric cancer. She is currently on chemotherapy. The plan is to continue chemo until the cancer can be surgically removed.    Interval History 6/1/2021:  The patient returns to clinic today for follow up of knee pain. She reports increased left knee pain over the last two weeks. She has increased pain with standing and walking. She states that she can feel the bones rubbing together. She continues to follow up with Bariatrics. She is actively trying to lose weight. She continues to follow up with GI. She is hoping to pursue gastric bypass. She continues to take Gabapentin with benefit. She continues to take Percocet as needed with benefit and without adverse effects. She denies any other health changes. Her pain today is 6/10.     Interval History 3/24/2021:  The patient returns to clinic today for follow up of knee pain. She is s/p left genicular RFA on 1/22/2021. She reports some benefit of her left knee pain. She is able to stand and walking for longer periods of time. She continues to report left knee pain. Since last visit, she had angiogram that was normal. She has discontinued anticoagulation. She also had a recent EGD which she is awaiting the results. She continues to see Bariatrics. She is actively trying to lose weight. She continues to take Gabapentin with benefit. She continues to take Percocet with benefit and without adverse effects. She denies any other health changes. Her pain today is 0/10.    Interval History 12/1/2020:  The patient returns to clinic today for follow up of knee pain. She reports increased left knee pain over the last few weeks. She also reports that her left knee feels weaker over the last month. She feels as thought it will give out with standing and walking. She would like to repeat RFA as she feels this helps her feel more stable and decreases  her pain. She continues to take Percocet with some benefit but asks if this can be stronger. She denies any adverse effects with this medication. She continues to follow up with Bariatrics. She continues to follow a diet plan and take Ozempic. She denies any other health changes. Her pain today is 10/10.    Interval History 9/3/2020:  The patient returns to clinic today for follow up of knee pain. She reports some relief with left knee Orthovisc injection in July. She continues to report left knee pain that is sharp in nature. This pain is worse with standing and walking. She feels as though her knee will give out with prolonged standing. She is actively trying to lose weight but is frustrated with lack of progress. She is following her diet plan. She continues to Percocet with benefit but it does not last. She denies any adverse effects. She denies any other health changes. Her pain today is 7/10.    Interval History 8/11/2020:  The patient returns to clinic today for follow up of knee pain. She is s/p left knee Orthovisc injection on 7/28/2020. She reports limited relief at this time. She continues to report left knee pain, especially with standing and walking. She feels as though her knee will buckle. She has recently quit smoking. She is actively trying to lose weight. She was recently started on Ozempic per her PCP. She is following her diet plan. She continues to take Percocet with benefit but reports that it does not last. She denies any adverse effects with this medication. She denies any other health changes. Her pain today is 8/10.    Interval History 7/8/2020:  Ca Coats presents to the clinic for a follow-up appointment for follow up after 6/19/20 RFA Since the last visit, Ca Coats states the pain has been persistant. Current pain intensity is 10/10.     Interval History 6/2/2020:  The patient returns to clinic today via audio visit for follow up of knee pain. She reports  increased left knee pain in the last week. Her pain is worse with standing and walking. She feels as though she will fall when standing. She is currently using ice, heat, and OTC lidocaine patches with limited relief. She continues to take Gabapentin and Percocet with some benefit. She denies any adverse effects. She denies any other health changes. Her pain today is 10/10.    Interval History (5/1/2020):  The patient returns for audio visit today for follow up. She continues to report bilateral knee pain, left greater than right. Her pain is worse with prolonged standing and walking. She reports that her back pain has improved. She continues to report benefit with current medication regimen. She continues to take Gabapentin and Percocet with benefit and without adverse effects. She denies any other health changes. Her pain today is 8/10.     Interval History (4/8/2020):  She returns today for follow up via audio.  She reports that the methocarbamol is not helping.  She continues to have a left-sided low back pain that is bothering her.  Her left knee continues to hurt.  Her right knee is hurting a little as well.  Percocet continues to help, but it is not lasting long enough.     Interval History (1/6/2020):  The patient returns to clinic today for follow up. She reports increased left leg pain today that is tingling in nature. She continues to report bilateral knee pain, left greater than right. She describes this pain as constant, sharp, and aching. She is no longer living at the nursing facility. She has increased her home exercise routine. She is actively trying to quit smoking in order to move forward with bariatric surgery. She continues to report benefit with current medication regimen. She takes Gabapentin and Percocet with benefit and without adverse effect. She denies any other health changes. Her pain today is 7/10.    Interval History (10/4/2019):  The patient returns to clinic today for follow up and  medication refill. She continues to report bilateral knee pain that is aching in nature. She reports intermittent left lateral thigh pain that is tingling and shooting in nature. She reports that her pain has been improving since last visit. She has increased her activity. She continues to report benefit with Gabapentin. She continues to take Percocet as needed with benefit. She denies any other health changes. Her pain today is 0/10.    Interval History (9/4/2019):  The patient returns to clinic today for follow up. She continues to report intermittent left lateral thigh and hip pain that is tingling and numb in nature. She continues to report left knee pain. She continues to have difficulty ambulating due to pain. She is currently living in a SNF. She continues to take Gabapentin 300 mg BID. This was increased at last visit but not increased at the nursing home. She does report benefit with Percocet though it only last approximately 4-5 hours. She denies any adverse effects. She does present with a care attendant from the SNF today. Her pain today is 10/10.    Interval History (8/8/2019):  She returns today for follow up s/p left knee genicular chemodenervation with phenol 7/19/19.  She reports that this procedure did not provide any improvement in her left knee symptoms, and she is still having significant difficulty with ambulation, still uses wheelchair for mobility. She also reports left lateral thigh and hip pain lateral upper thigh numbness. She has been admitted to [facility] but is not receiving PT there. She is still considering lap band surgery but has missed a followup appointment.     Interval History (7/11/19):  Patient reported to ED today for increased L knee pain and an episode of LLE weakness that lead to a near fall. Patient is s/p L genicular RFA in 3/19/19 that provided 90% relief for 2 months then came back. Pain is a crunchy pain, in left knee, that does not radiate, worse with  "movement, better with rest. Pain today is 9/10. She normally uses cane to ambulate but is currently using wheelchair at hospital. Denies any trauma ot the area, fever/chills, n/v, abdominal pain, or HA.    Interval History (4/12/2019):  The patient returns for f/u of left knee pain.  She is s/p left genicular cooled RFA with about 90% pain relief.  She has been able to walk easier.  She also notices less stiffness in her knee.  She is planning on gastric surgery prior to knee replacement.  She had benefit with compounding cream but it is no longer covered by her insurance.  She does take Percocet from her PCP.  Her pain today is 7/10.    Interval History (2/28/2019):  The patient returns to clinic today for follow up. She is s/p Euflexxa series completed on 1/30/2019. She reports that she able to stand for longer periods of time since the procedure. She continues to report left knee pain that is sore and aching in nature. Her pain is worse with prolonged standing and walking. She was previously scheduled for weight loss surgery but reports this was canceled. She is scheduled for follow up next week about this. She denies any other health changes.     Interval History (1/9/2019):  The patient returns to clinic today for follow up. She is s/p left knee steroid injection on 12/21/2018. She reports one day of relief. She continues to report left knee pain that is constant, sharp, and aching in nature. Her pain is worse with standing and walking. She reports that her knee "locks" up on her while walking. She often feels as though she is going to fall. She is scheduled for bariatric weight loss surgery in [place]. She denies any other health changes.     Interval History (11/21/2018):  The patient returns to clinic today for follow up. She reports increased left knee pain this past week. She reports 2 episodes where her knee has "locked" up on her while walking. She describes her knee pain as sore and aching. " She is actively trying to lose weight and quit smoking. She continues to use the compound cream with benefit. She denies any other health changes.     Interval History (8/21/2018):  The patient returns to clinic today for follow up. She is s/p left genicular cooled RFA on 7/31/2018. She reports 60% relief of her left knee pain. She reports intermittent knee pain that is sore and aching in nature. She continues to perform a home exercise routine. She is actively trying to lose weight. She continues to follow up with Bariatric Surgery at [facility]. She is considering weight loss surgery. She continues to use compound cream with benefit. She denies any other health changes.     Interval History (7/19/2018):  The patient returns to clinic today for follow up. She is s/p left genicular nerve block on 7/10/2018. She reports 80% relief of her left knee pain. She reports that she was able to walk for longer distances (3 blocks) after the block. Her pain has returned to baseline. She continues to report left knee pain that is constant, sharp, and aching in nature. Her pain is worse with prolonged walking and standing. She denies any other health changes.     Interval History (7/2/2018):  The patient returns to clinic today for follow up. She continues to report left knee pain that is constant, sharp, and aching in nature. Her pain is worse with prolonged walking and standing. She is scheduled for weight loss surgery in August. She continues follow up with orthopedics who does not recommend surgery at this time due to her BMI. She continues to take Percocet as needed with benefit. She denies any other health changes.     Interval History (5/31/2018):  The patient returns to clinic for follow up. She is s/p left knee Synvisc One injection on 5/8/2018. She reports 35% relief of her knee pain. She continues to report bilateral knee pain, left greater than right. She describes this pain as constant and aching. Her pain is worse with  "prolonged walking and standing. She is scheduled to see Orthopedics at the  End of this month. She is also following up with Bariatrics to discuss the lap band procedure. She continues to take Percocet with benefit. She also uses Voltaren gel with benefit but this is expensive for her. She denies any other health changes.         Interval History (4/30/2018):  The patient returns to clinic today for follow up and records review. We did received records from [facility] including a MRI of her knee. Dr. Iyer's last office visit note from January 2017 does not include any previous injections. She continues to report bilateral knee pain, left greater than right. She describes this pain as constant and aching. This pain is worse with prolonged walking. She also report right shoulder pain with prolonged overhead activity. She continues to take Percocet with benefit. She denies any other health changes.    Initial HPI:  Ca Coats is a 69 y.o. female who presents today with left knee pain. Her pain has been going on for "too long."  She was previously treated by Dr. Iyer at [facility].  She has injections [id number] with him.  These were helpful, but she does not know if the injections were steroid or viscosupplementation.   This pain is described in detail below.    Pain Disability Index Review:      4/30/2024    10:49 AM 4/16/2024     8:52 AM 5/1/2023     9:54 AM   Last 3 PDI Scores   Pain Disability Index (PDI) 21 27 20       Pain Medications:    Current:  Gabapentin, tylenol 500mg Q6 prn, Percocet 5/325 mg TID PRN    Opioid Contract: yes     report:  Reviewed and consistent with medication use as prescribed.    Pain Procedures:   5/8/2018- Left knee Synvisc One injection  7/10/2018- Left genicular nerve block  7/31/2018- Left genicular cooled RFA  12/21/2018- Left knee steroid injection  1/31/2019- Left knee Euflexxa series  3/19/2019 Left genicular cooled RFA- 90% relief  7/19/19- Left knee genicular " phenol chemodenervation-  6/19/2020- Left genicular RFA  1/22/2021- Left genicular RFA  6/2021- Left knee Orthovisc injection  7/30/2021- Left genicular RFA  12/14/2021- Left knee Monovisc injection  4/22/2022- Left genicular RFA  12/23/2022- Left genicular RFA- 75-80% relief  4/30/2024- Left knee Synvisc One injection- 50% relief     Physical Therapy//Home Exercise: yes, she did this in 2018 for one month (twice a week). She does HEP (stretching and ROM in the morning)    Imaging:   XRAY KNEE 1/18/24:     Rm 20; Unilateral primary osteoarthritis, left knee     TECHNIQUE:  One or two views of the left knee were performed.     COMPARISON:  06/14/2023     FINDINGS:  The skeletal structures are intact.  Degenerative joint disease is again identified with small spurs at several positions and narrowing of the medial tibiofemoral joint space.  No significant joint effusion.     Impression:     DJD left knee    MRI Left Knee 7/21/2017 (in media):  The medial meniscus is intact. The lateral meniscus is intact.      A chronic complete ACL tear is noted. The posterior cruciate ligament is intact. The medial and lateral retinacula are intact. The medial collateral ligament is intact. The lateral collateral ligament in intact. The posterolateral corner structures are intact.      There is full thickness fissuring of the central aspect medial femoral cartilage. The lateral tibiofemoral compartment cartilage is intact. There is broad thickness defect within the central trochlear cartilage. There is mild lateral patellofemoral cartilage thinning and regional full thickness loss of the central superior patellar cartilage.      A small effusion is present. There is trace infrapatellar fluid. Tiny popliteal cyst is noted. Subtle focal marrow edema is seen within the posterior tibial plateau. The bone marrow signal is heterogeneous likely reflecting marrow reconversion.      There is trace pes anserine bursitis. The tendons are  otherwise normal.      Grade 2 fatty streaking of the semimembranosus and vastus lateralis muscles are noted. There is mild prepatellar edema.      MRI Right Shoulder 7/21/2017 (in media):   There is a complete tear of the supraspinatus tendon with retraction to the glenohumeral joint. There is partial tearing of the anterior infraspinatus articular surface. Mild subscapularis tendinosis is noted. The teres minor is intact. A small amount of subacromial/subdeltoid fluid is noted. The proximal long head biceps tendon is poorly visualized proximally although intact distally.      Degenerative tearing of the anterosuperior through posterosuperior labrum is noted. There is moderate to severe superior glenoid cartilage thinning with subchondral degenerative changes. The glenohumeral ligaments appear grossly intact.      An os acromiale is seen with fluid and osteophytosis at its junction with the base of the acromion. There is moderate superior subluxation of the AC joint with mild osteophytosis.      Small effusion with subcoracoid extension is noted.      There is grade 2/3 fatty atrophy of the supraspinatus muscle, grade 2 of the infraspinatus and subscapularis. Heterogeneous signal is seen throughout the bone marrow likely reflecting marrow reconversion.        Allergies: Review of patient's allergies indicates:  No Known Allergies    Current Medications:   Current Outpatient Medications   Medication Sig Dispense Refill    acetaminophen (TYLENOL) 500 MG tablet Take by mouth daily as needed.      amLODIPine (NORVASC) 10 MG tablet Take 1 tablet (10 mg total) by mouth once daily. 90 tablet 11    aspirin (ECOTRIN) 325 MG EC tablet Take 1 tablet (325 mg total) by mouth once daily.  11    atorvastatin (LIPITOR) 20 MG tablet Take 1 tablet (20 mg total) by mouth once daily. 90 tablet 11    B-complex with vitamin C (Z-BEC OR EQUIV) tablet Take 1 tablet by mouth once daily.       CALCIUM CITRATE-VITAMIN D2 ORAL Take 1 tablet  by mouth once daily.       citalopram (CELEXA) 10 MG tablet Take 1 tablet (10 mg total) by mouth once daily. 90 tablet 11    clopidogreL (PLAVIX) 75 mg tablet Take 1 tablet (75 mg total) by mouth once daily. 30 tablet 11    dextran 70-hypromellose (TEARS) ophthalmic solution Place 1 drop into the right eye every 6 (six) hours. 30 mL 0    ferrous gluconate (FERGON) 324 MG tablet TAKE 1 TABLET (324 MG TOTAL) BY MOUTH DAILY WITH BREAKFAST. 90 tablet 3    furosemide (LASIX) 80 MG tablet Take 1 tablet (80 mg total) by mouth 2 (two) times daily. 180 tablet 11    gabapentin (NEURONTIN) 300 MG capsule Take 2 capsules (600 mg total) by mouth 3 (three) times daily. 540 capsule 2    LIDOcaine-prilocaine (EMLA) cream Apply topically as needed (prior to port access). 30 g 0    lisinopriL (PRINIVIL,ZESTRIL) 40 MG tablet Take 1 tablet (40 mg total) by mouth once daily. 90 tablet 11    multivitamin with minerals tablet Take 1 tablet by mouth once daily.       oxyCODONE-acetaminophen (PERCOCET) 5-325 mg per tablet Take 1 tablet by mouth every 8 (eight) hours as needed for Pain. 90 tablet 0    pantoprazole (PROTONIX) 40 MG tablet Take 1 tablet (40 mg total) by mouth once daily. 90 tablet 11    semaglutide (OZEMPIC) 1 mg/dose (4 mg/3 mL) Inject 1 mg into the skin every 7 days. 9 each 11     No current facility-administered medications for this visit.     Facility-Administered Medications Ordered in Other Visits   Medication Dose Route Frequency Provider Last Rate Last Admin    0.9%  NaCl infusion   Intravenous Continuous Tevin Clark MD 0 mL/hr at 01/13/22 1055 New Bag at 03/05/24 1000    0.9%  NaCl infusion   Intravenous Continuous Kevin Carballo MD        sodium chloride 0.9% flush 10 mL  10 mL Intravenous PRN Kevin Carballo MD           REVIEW OF SYSTEMS:    GENERAL:  No weight loss, malaise or fevers.  HEENT:  Negative for frequent or significant headaches.  NECK:  Negative for lumps, goiter, pain and significant  neck swelling.  RESPIRATORY:  Negative for cough, wheezing or shortness of breath. +LAURA  CARDIOVASCULAR:  Negative for chest pain, leg swelling or palpitations. HTN  GI:  Negative for abdominal discomfort, blood in stools or black stools or change in bowel habits. +GERD, gastric cancer   MUSCULOSKELETAL:  See HPI  SKIN:  Negative for lesions, rash, and itching.  PSYCH:  Positive for  mood disorder and recent psychosocial stressors. +sleep disturbance  HEMATOLOGY/LYMPHOLOGY:  Negative for prolonged bleeding, bruising easily or swollen nodes.  NEURO:   No history of headaches, syncope, paralysis, seizures or tremors.  ENDO: Thyroid disorder.   All other reviewed and negative other than HPI.    Past Medical History:  Past Medical History:   Diagnosis Date    YOUNG (acute kidney injury) 10/15/2021    Anemia     2018    Arthritis     BMI 60.0-69.9, adult     Cataract     Chronic diastolic congestive heart failure 01/27/2021    Deep vein thrombosis     Depression     Dry eye syndrome     DVT of deep femoral vein, right 05/29/2023    Gastric adenocarcinoma 05/25/2021    GERD (gastroesophageal reflux disease)     Glaucoma suspect     History of gastric ulcer     History of psychiatric hospitalization     Hx of psychiatric care     Celexa, no recall of charted Effexor, Lexapro, Desyrel prescriptions    Hyperlipidemia     Hypertension     Hypothyroidism     Morbid obesity     Neuromuscular disorder     Sleep apnea     Thyroid disease        Past Surgical History:  Past Surgical History:   Procedure Laterality Date    APPENDECTOMY      CARDIAC SURGERY      CATARACT EXTRACTION W/  INTRAOCULAR LENS IMPLANT Bilateral     MFIOL OU    CORONARY ANGIOGRAPHY Bilateral 02/24/2021    Procedure: ANGIOGRAM, CORONARY ARTERY;  Surgeon: Cresencio Ridley MD;  Location: [facility] CATH LAB;  Service: Cardiology;  Laterality: Bilateral;  Covid test needed - ok per Danay MORENOU. Pt. w/o transportation or family    ENDOSCOPIC ULTRASOUND OF UPPER  GASTROINTESTINAL TRACT N/A 03/17/2021    Procedure: ULTRASOUND, UPPER GI TRACT, ENDOSCOPIC;  Surgeon: Gerald Anaya MD;  Location: [facility] (2ND FLR);  Service: Endoscopy;  Laterality: N/A;  Pt unable to get a ride for swab. Will do rapid test at 8:30 - ttr    ENDOSCOPIC ULTRASOUND OF UPPER GASTROINTESTINAL TRACT N/A 01/13/2022    Procedure: ULTRASOUND, UPPER GI TRACT, ENDOSCOPIC;  Surgeon: Kevin Carballo MD;  Location: [facility] (2ND FLR);  Service: Endoscopy;  Laterality: N/A;  blood thinner approval received, see telephone encounter 1/7/22-BB  rapid  instructions given verbally and sent to address on file in pt's chart-BB    ENDOSCOPIC ULTRASOUND OF UPPER GASTROINTESTINAL TRACT N/A 10/11/2022    Procedure: ULTRASOUND, UPPER GI TRACT, ENDOSCOPIC;  Surgeon: Dequan Ridley MD;  Location: [facility] (2ND FLR);  Service: Endoscopy;  Laterality: N/A;    ENDOSCOPIC ULTRASOUND OF UPPER GASTROINTESTINAL TRACT N/A 1/24/2024    Procedure: ULTRASOUND, UPPER GI TRACT, ENDOSCOPIC;  Surgeon: Kevin Carballo MD;  Location: [facility] (2ND FLR);  Service: Endoscopy;  Laterality: N/A;    ENDOSCOPIC ULTRASOUND OF UPPER GASTROINTESTINAL TRACT N/A 3/5/2024    Procedure: ULTRASOUND, UPPER GI TRACT, ENDOSCOPIC;  Surgeon: Kevin Carballo MD;  Location: [facility] (2ND FLR);  Service: Endoscopy;  Laterality: N/A;  1/25 portal instr-Repeat theEUS at the next available appointment because the preparation was poor. 48hrs of liquid. Ozempic 7 day hold-eliquis approved Dr. Pelaez TE 12/12/2023-tt  2/28-precall complete-pt verbalized udnerstanding of holding Oz    ESOPHAGOGASTRODUODENOSCOPY N/A 02/05/2021    Procedure: EGD (ESOPHAGOGASTRODUODENOSCOPY);  Surgeon: Matthew Hernadez MD;  Location: [facility] ENDO (2ND FLR);  Service: Endoscopy;  Laterality: N/A;  BMI-58    Wt: 361#     COVID test at W on 2/2-GT    ESOPHAGOGASTRODUODENOSCOPY N/A 03/17/2021    Procedure: EGD (ESOPHAGOGASTRODUODENOSCOPY);  Surgeon: Gerald Anaya MD;   Location: [facility] ENDO (2ND FLR);  Service: Endoscopy;  Laterality: N/A;    ESOPHAGOGASTRODUODENOSCOPY N/A 05/25/2021    Procedure: EGD (ESOPHAGOGASTRODUODENOSCOPY);  Surgeon: Kevin Carballo MD;  Location: [facility] ENDO (2ND FLR);  Service: Endoscopy;  Laterality: N/A;  ESD 2 hours  Full COVID vaccination on file. ttr    ESOPHAGOGASTRODUODENOSCOPY N/A 01/13/2022    Procedure: EGD (ESOPHAGOGASTRODUODENOSCOPY);  Surgeon: Kevin Carballo MD;  Location: [facility] ENDO (2ND FLR);  Service: Endoscopy;  Laterality: N/A;  blood thinner approval, see telephone encounter 1/7/22-BB  rapid  instructions given verbally and sent to address on file in pt's chart-BB    ESOPHAGOGASTRODUODENOSCOPY N/A 10/11/2022    Procedure: EGD (ESOPHAGOGASTRODUODENOSCOPY);  Surgeon: Dequan Ridley MD;  Location: [facility] ENDO (2ND FLR);  Service: Endoscopy;  Laterality: N/A;  She is do for EGD/EUS now. Personal history of gastric cancer. Ca Coats #[id number].   Thanks,   Kevin Fuentes MD    Pt is fully vaccinated-DS  9/14/22-Approval to hold Eliquis rec'd from Dr. Pelaez (see telephone encounter 9/14/22)-DS  9/14/22-Ins    ESOPHAGOGASTRODUODENOSCOPY N/A 1/24/2024    Procedure: EGD (ESOPHAGOGASTRODUODENOSCOPY);  Surgeon: Kevin Carballo MD;  Location: [facility] ENDO (2ND FLR);  Service: Endoscopy;  Laterality: N/A;  12/8/23: instructions sent via portal. eliquis approval from MD Pelaez in telephone encounter (12/12/23) -GD  1/9-precall complete-MS    ESOPHAGOGASTRODUODENOSCOPY N/A 3/5/2024    Procedure: EGD (ESOPHAGOGASTRODUODENOSCOPY);  Surgeon: Kevin Carballo MD;  Location: [facility] ENDO (2ND FLR);  Service: Endoscopy;  Laterality: N/A;    EYE SURGERY      HYSTEROSCOPIC POLYPECTOMY OF UTERUS  1/10/2024    Procedure: POLYPECTOMY, UTERUS, HYSTEROSCOPIC;  Surgeon: Pina Pickens MD;  Location: [facility];  Service: OB/GYN;;    HYSTEROSCOPY WITH DILATION AND CURETTAGE OF UTERUS N/A 1/10/2024    Procedure: HYSTEROSCOPY, WITH DILATION AND CURETTAGE OF  UTERUS;  Surgeon: Pina Pickens MD;  Location: [facility] OR;  Service: OB/GYN;  Laterality: N/A;    INJECTION OF ANESTHETIC AGENT AROUND NERVE Left 07/10/2018    Procedure: BLOCK, NERVE;  Surgeon: Samantha Vidal MD;  Location: [facility] PAIN MGT;  Service: Pain Management;  Laterality: Left;  Genicular     INJECTION OF ANESTHETIC AGENT AROUND NERVE Left 07/19/2019    Procedure: Block, Nerve NERVE BLOCK GENICULAR WITH PHENOL 6%;  Surgeon: Samantha Vidal MD;  Location: [facility] PAIN MGT;  Service: Pain Management;  Laterality: Left;  NEEDS CONSENT    INSERTION OF TUNNELED CENTRAL VENOUS CATHETER (CVC) WITH SUBCUTANEOUS PORT N/A 07/09/2021    Procedure: INSERTION, PORT-A-CATH;  Surgeon: Mario Paz MD;  Location: [facility] CATH LAB;  Service: Radiology;  Laterality: N/A;  PORT PLACEMENT    RADIOFREQUENCY ABLATION Left 06/19/2020    Procedure: RADIOFREQUENCY ABLATION LEFT GENICULAR;  Surgeon: Tevin Clark MD;  Location: [facility] PAIN MGT;  Service: Pain Management;  Laterality: Left;  Left Genicular RFA    RADIOFREQUENCY ABLATION Left 01/22/2021    Procedure: RADIOFREQUENCY ABLATION LEFT GENICULAR;  Surgeon: eTvin Clark MD;  Location: [facility] PAIN MGT;  Service: Pain Management;  Laterality: Left;    RADIOFREQUENCY ABLATION Left 07/30/2021    Procedure: RADIOFREQUENCY ABLATION, GENICULAR COOLED NEED CONSENT;  Surgeon: Tevin Clark MD;  Location: [facility] PAIN MGT;  Service: Pain Management;  Laterality: Left;    RADIOFREQUENCY ABLATION Left 04/22/2022    Procedure: RADIOFREQUENCY ABLATION, GENICULAR COOLED , LEFT;  Surgeon: Tevin Clark MD;  Location: [facility] PAIN MGT;  Service: Pain Management;  Laterality: Left;    RADIOFREQUENCY ABLATION Left 12/23/2022    Procedure: RADIOFREQUENCY ABLATION LEFT GENICULAR COOLED CLEARED TO HOLD ELIQUIS 3 DAYS  NEEDS CONSENT;  Surgeon: Tevin Clark MD;  Location: [facility] PAIN MGT;  Service: Pain Management;  Laterality: Left;    TONSILLECTOMY         Family History:  Family History    Problem Relation Name Age of Onset    No Known Problems Mother      No Known Problems Father      Colon cancer Neg Hx      Esophageal cancer Neg Hx         Social History:  Social History     Socioeconomic History    Marital status: Single    Number of children: 2   Occupational History     Comment: retired  and cook   Tobacco Use    Smoking status: Former     Current packs/day: 0.00     Average packs/day: 1 pack/day for 53.9 years (53.7 ttl pk-yrs)     Types: Cigarettes     Start date:      Quit date: 2023     Years since quittin.7    Smokeless tobacco: Never    Tobacco comments:     currently smoking <5 cigarettes most days   Substance and Sexual Activity    Alcohol use: Yes     Alcohol/week: 1.0 standard drink of alcohol     Types: 1 Glasses of wine per week     Comment: rarely    Drug use: No    Sexual activity: Not Currently     Partners: Male   Social History Narrative    2 children (dtr in area, son in [place]) and she has 3 grandkids (in [place]),    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    Originally from [facility]     Social Determinants of Health     Financial Resource Strain: Medium Risk (10/31/2023)    Overall Financial Resource Strain (CARDIA)     Difficulty of Paying Living Expenses: Somewhat hard   Food Insecurity: No Food Insecurity (10/31/2023)    Hunger Vital Sign     Worried About Running Out of Food in the Last Year: Never true     Ran Out of Food in the Last Year: Never true   Transportation Needs: Unmet Transportation Needs (10/31/2023)    PRAPARE - Transportation     Lack of Transportation (Medical): Yes     Lack of Transportation (Non-Medical): Yes   Physical Activity: Inactive (10/31/2023)    Exercise Vital Sign     Days of Exercise per Week: 0 days     Minutes of Exercise per Session: 0 min   Stress: Stress Concern Present (10/31/2023)    [employer] - Occupational Stress Questionnaire     Feeling of Stress : To some extent   Housing  Stability: Low Risk  (10/31/2023)    Housing Stability Vital Sign     Unable to Pay for Housing in the Last Year: No     Number of Places Lived in the Last Year: 1     Unstable Housing in the Last Year: No       OBJECTIVE:     /82   Pulse 97   Temp 98.1 °F (36.7 °C)   Wt 120.3 kg (265 lb 3.4 oz)   SpO2 99%   BMI 42.81 kg/m²     PHYSICAL EXAMINATION:     General appearance: Well appearing, in no acute distress, alert and oriented x3.  Psych:  Mood and affect appropriate.  Skin: Skin color, texture, turgor normal, no rashes or lesions, in both upper and lower body.  Head/face:  Atraumatic, normocephalic.   Pulm: Symmetric chest rise, no respiratory distress noted.   Extremities:  No deformities or skin discoloration. Good capillary refill. +2 pitting edema to bilateral feet.   Musculoskeletal: There is mild pain with palpation over medial and lateral joint line of left knee. Limited ROM with mild pain. Crepitus noted to left knee. Right knee maneuvers are negative. No atrophy or tone abnormalities are noted.  Neuro:   No loss of sensation is noted.  Gait: Antalgic- ambulates with wheelchair.     ASSESSMENT: 73 y.o. year old female with left knee pain, consistent with the followin. Chronic pain disorder  gabapentin (NEURONTIN) 300 MG capsule      2. Chronic pain of left knee  gabapentin (NEURONTIN) 300 MG capsule      3. Primary osteoarthritis of left knee  gabapentin (NEURONTIN) 300 MG capsule      4. Gastric adenocarcinoma        5. Encounter for monitoring opioid maintenance therapy              PLAN:     - Previous imaging reviewed. Labs reviewed.     - We can repeat left knee Synvisc One injection as needed.     - Continue Gabapentin 600 mg TID, refill provided.     - Pain contract signed 2020.    - Continue Percocet 5/325 mg TID PRN. Refill provided today.     - The patient is here today for a refill of current pain medications and they believe these provide effective pain control and  improvements in quality of life.  The patient notes no serious side effects, and feels the benefits outweigh the risks.  The patient was reminded of the pain contract that they signed previously as well as the risks and benefits of the medication including possible death.  The updated [place] Board [employer] prescription monitoring program was reviewed, and the patient has been found to be compliant with current treatment plan. Medication management provided by Dr. Clark.     - UDS from 4/16/2024 reviewed and consistent.     - I have stressed the importance of physical activity and a home exercise plan to help with pain and improve health.    - RTC in 3 months.     The above plan and management options were discussed at length with patient. Patient is in agreement with the above and verbalized understanding.    Savanna Hyatt NP  09/06/2024

## 2024-09-10 ENCOUNTER — OFFICE VISIT (OUTPATIENT)
Dept: INTERVENTIONAL RADIOLOGY/VASCULAR | Facility: CLINIC | Age: 74
End: 2024-09-10
Payer: MEDICARE

## 2024-09-10 ENCOUNTER — LAB VISIT (OUTPATIENT)
Dept: LAB | Facility: HOSPITAL | Age: 74
End: 2024-09-10
Payer: MEDICARE

## 2024-09-10 VITALS
DIASTOLIC BLOOD PRESSURE: 64 MMHG | HEART RATE: 73 BPM | SYSTOLIC BLOOD PRESSURE: 104 MMHG | BODY MASS INDEX: 42.62 KG/M2 | HEIGHT: 66 IN | WEIGHT: 265.19 LBS

## 2024-09-10 DIAGNOSIS — C16.9 GASTRIC ADENOCARCINOMA: ICD-10-CM

## 2024-09-10 DIAGNOSIS — R91.8 PULMONARY NODULES/LESIONS, MULTIPLE: ICD-10-CM

## 2024-09-10 DIAGNOSIS — R91.8 PULMONARY NODULES/LESIONS, MULTIPLE: Primary | ICD-10-CM

## 2024-09-10 DIAGNOSIS — D49.9 NEOPLASM: ICD-10-CM

## 2024-09-10 LAB
BASOPHILS # BLD AUTO: 0.04 K/UL (ref 0–0.2)
BASOPHILS NFR BLD: 0.4 % (ref 0–1.9)
DIFFERENTIAL METHOD BLD: ABNORMAL
EOSINOPHIL # BLD AUTO: 0.1 K/UL (ref 0–0.5)
EOSINOPHIL NFR BLD: 0.9 % (ref 0–8)
ERYTHROCYTE [DISTWIDTH] IN BLOOD BY AUTOMATED COUNT: 12.5 % (ref 11.5–14.5)
HCT VFR BLD AUTO: 36.2 % (ref 37–48.5)
HGB BLD-MCNC: 11.2 G/DL (ref 12–16)
IMM GRANULOCYTES # BLD AUTO: 0.03 K/UL (ref 0–0.04)
IMM GRANULOCYTES NFR BLD AUTO: 0.3 % (ref 0–0.5)
INR PPP: 0.9 (ref 0.8–1.2)
LYMPHOCYTES # BLD AUTO: 2.2 K/UL (ref 1–4.8)
LYMPHOCYTES NFR BLD: 21.1 % (ref 18–48)
MCH RBC QN AUTO: 31.5 PG (ref 27–31)
MCHC RBC AUTO-ENTMCNC: 30.9 G/DL (ref 32–36)
MCV RBC AUTO: 102 FL (ref 82–98)
MONOCYTES # BLD AUTO: 0.9 K/UL (ref 0.3–1)
MONOCYTES NFR BLD: 9 % (ref 4–15)
NEUTROPHILS # BLD AUTO: 7 K/UL (ref 1.8–7.7)
NEUTROPHILS NFR BLD: 68.3 % (ref 38–73)
NRBC BLD-RTO: 0 /100 WBC
PLATELET # BLD AUTO: 261 K/UL (ref 150–450)
PMV BLD AUTO: 10.3 FL (ref 9.2–12.9)
PROTHROMBIN TIME: 10.3 SEC (ref 9–12.5)
RBC # BLD AUTO: 3.56 M/UL (ref 4–5.4)
WBC # BLD AUTO: 10.3 K/UL (ref 3.9–12.7)

## 2024-09-10 PROCEDURE — 1159F MED LIST DOCD IN RCRD: CPT | Mod: CPTII,S$GLB,, | Performed by: FAMILY MEDICINE

## 2024-09-10 PROCEDURE — 3008F BODY MASS INDEX DOCD: CPT | Mod: CPTII,S$GLB,, | Performed by: FAMILY MEDICINE

## 2024-09-10 PROCEDURE — 85610 PROTHROMBIN TIME: CPT | Performed by: FAMILY MEDICINE

## 2024-09-10 PROCEDURE — 99999 PR PBB SHADOW E&M-EST. PATIENT-LVL IV: CPT | Mod: PBBFAC,,, | Performed by: FAMILY MEDICINE

## 2024-09-10 PROCEDURE — 4010F ACE/ARB THERAPY RXD/TAKEN: CPT | Mod: CPTII,S$GLB,, | Performed by: FAMILY MEDICINE

## 2024-09-10 PROCEDURE — 99214 OFFICE O/P EST MOD 30 MIN: CPT | Mod: 25,S$GLB,, | Performed by: FAMILY MEDICINE

## 2024-09-10 PROCEDURE — 3074F SYST BP LT 130 MM HG: CPT | Mod: CPTII,S$GLB,, | Performed by: FAMILY MEDICINE

## 2024-09-10 PROCEDURE — 85025 COMPLETE CBC W/AUTO DIFF WBC: CPT | Performed by: FAMILY MEDICINE

## 2024-09-10 PROCEDURE — 3044F HG A1C LEVEL LT 7.0%: CPT | Mod: CPTII,S$GLB,, | Performed by: FAMILY MEDICINE

## 2024-09-10 PROCEDURE — 1160F RVW MEDS BY RX/DR IN RCRD: CPT | Mod: CPTII,S$GLB,, | Performed by: FAMILY MEDICINE

## 2024-09-10 PROCEDURE — 3078F DIAST BP <80 MM HG: CPT | Mod: CPTII,S$GLB,, | Performed by: FAMILY MEDICINE

## 2024-09-10 PROCEDURE — 36415 COLL VENOUS BLD VENIPUNCTURE: CPT | Performed by: FAMILY MEDICINE

## 2024-09-10 NOTE — LETTER
September 10, 2024    Ca Coats  [address] [address]     [facility] 6[facility] Fl  [address]  [facility] [address]  Phone: [phone] PRE-PROCEDURE INSTRUCTIONS    Your procedure with Interventional Radiology is scheduled for _______________________.     Please arrive by _______________________. Please note your appointment time in [facility] will be different.    You must check-in and receive a wristband before going to your procedure. We will call you the day before to let you know your check-in location.    DO NOT take aspirin for 5 days before your procedure.  DO NOT take Ozempic for 7 days before your procedure.    **Do not eat or drink anything between midnight and the time of your procedure. This includes gum, mints, and candy lemon drops.    **Do not smoke or drink alcoholic beverages 24 hours prior to your procedure.    **If you wear contact lenses, dentures, hearing aids, or glasses, bring a container to put them in during the procedure and give them to a family member for safekeeping.    **If you have been diagnosed with sleep apnea please bring your CPAP machine.    **If your doctor has scheduled you for an overnight stay, bring a small overnight bag with any personal items that you may need.    **Make arrangements in advance for transportation home by a responsible adult. It is not safe to drive a vehicle during the 24 hours following the procedure.    **All [facility] facilities and properties are tobacco free. Smoking is NOT allowed.    PLEASE NOTE: The procedure schedule has many variables which affect the time of your procedure. Family members should be available if your surgery time changes.    If you have any questions about these instructions call Interventional Radiology at [phone] Monday - Friday between 8:00am and 4:00pm or [phone] (ask for interventional radiology physician) for after hours.

## 2024-09-10 NOTE — PROGRESS NOTES
"Subjective     Patient ID: Ca Coats is a 73 y.o. female.    Chief Complaint: Lesion    Patient referred to Interventional Radiology by Rossana Motley MD for evaluation of a left pulmonary nodule. Patient has a history of gastric adenocarcinoma. Recent imaging with CT C/A/P obtained on 5/27/2024 noted "Few, scattered irregular appearing pulmonary nodules, the largest in the left upper lobe measuring 1.0 cm, and the largest in the right lung measuring 0.7 cm."     CT scan of the chest obtained on 8/23/2024 confirmed "Stable irregularly shaped pulmonary nodules in both lungs, largest measuring 9 mm.  No new nodules.  For multiple solid pulmonary nodules greater than 8mm in size 2017 fleischner society guidelines recommendCT at 3-6 months, then consider CT at 18-24months in a low risk patient. In a high risk patient, CT at 3-6months, then 18-24 months."    Patient also has a 53.7 pack year history of smoking.     She has complaints of fatigue. She denies any chronic cough, hemoptysis, or dyspnea.       Review of Systems   Constitutional:  Positive for activity change (decreased due to knee pain) and fatigue (x "a while"). Negative for appetite change, chills and fever.   Respiratory:  Negative for cough, shortness of breath, wheezing and stridor.    Cardiovascular:  Positive for leg swelling. Negative for chest pain and palpitations.   Gastrointestinal:  Positive for constipation (alleviated wtih prune juice and OTC) and nausea (sometimes in the morning). Negative for abdominal distention, abdominal pain, diarrhea and vomiting.   Neurological:  Positive for dizziness.          Objective     Physical Exam  Constitutional:       General: She is not in acute distress.     Appearance: She is well-developed. She is not diaphoretic.   HENT:      Head: Normocephalic and atraumatic.   Pulmonary:      Effort: Pulmonary effort is normal. No respiratory distress.   Neurological:      Mental Status: She is alert and " oriented to person, place, and time.   Psychiatric:         Behavior: Behavior normal.         Thought Content: Thought content normal.         Judgment: Judgment normal.       Reviewed oncology progress note    CT 8/23/2024       Assessment and Plan     1. Pulmonary nodules/lesions, multiple  Overview:  Current smoker.  Bilateral may be inflammatory.  History of gastric cancer as well.  Will follow up in three months with CT prior, if persistent will perform robotic bronchoscopy    Orders:  -     CBC Auto Differential; Future; Expected date: 09/10/2024  -     Protime-INR; Future; Expected date: 09/10/2024  -     IR Biopsy Lung w/ guidance; Future; Expected date: 09/10/2024    2. Gastric adenocarcinoma  Overview:  gastric cancer cT2 or higher    Orders:  -     CBC Auto Differential; Future; Expected date: 09/10/2024  -     Protime-INR; Future; Expected date: 09/10/2024  -     IR Biopsy Lung w/ guidance; Future; Expected date: 09/10/2024    3. Neoplasm  -     CBC Auto Differential; Future; Expected date: 09/10/2024  -     Protime-INR; Future; Expected date: 09/10/2024  -     IR Biopsy Lung w/ guidance; Future; Expected date: 09/10/2024        Discussed case with Dr. Diaz. Explained to patient can offer biopsy of left pulmonary nodule. Discussed how the procedure will be performed, risks (including, but not limited to, pain, bleeding, infection, damage to nearby structures, potential to develop pneumothorax and subsequent need for chest tube placement, and the need for additional procedures), benefits, possible complications, pre-post procedure expectations, and alternatives. Cautioned the size of pulmonary nodule may also result in non-diagnostic sample. Advised patient to hold ASA and Plavix for 5 days. Patient tells me she has not started the Plavix because this is for a procedure she has scheduled in October. Also advised to hold Ozempic for at least 7 days prior to biopsy. The patient voices understanding and  all questions have been answered.  The patient agrees to proceed as planned. Patient scheduled for 10/3/2024 with anesthesia secondary to LAURA with CPAP. Pre-procedure handout with clinic phone number provided.

## 2024-09-10 NOTE — Clinical Note
"Thank you for referring Ms. Coats to Interventional Radiology at the [facility]. Please don't hesitate to contact us if there are any questions regarding this evaluation at [phone]. If you have any other patients for whom you would like a consultation, please place an order for "[id number]", and we will be happy to review their case.  Sincerely, SIN Nunn, FNP Interventional Radiology   "

## 2024-09-16 ENCOUNTER — TELEPHONE (OUTPATIENT)
Dept: PAIN MEDICINE | Facility: CLINIC | Age: 74
End: 2024-09-16
Payer: MEDICARE

## 2024-09-16 DIAGNOSIS — G89.4 CHRONIC PAIN DISORDER: ICD-10-CM

## 2024-09-16 DIAGNOSIS — G89.29 CHRONIC PAIN OF LEFT KNEE: ICD-10-CM

## 2024-09-16 DIAGNOSIS — M25.562 CHRONIC PAIN OF LEFT KNEE: ICD-10-CM

## 2024-09-16 DIAGNOSIS — M17.12 PRIMARY OSTEOARTHRITIS OF LEFT KNEE: ICD-10-CM

## 2024-09-16 RX ORDER — GABAPENTIN 300 MG/1
600 CAPSULE ORAL 3 TIMES DAILY
Qty: 540 CAPSULE | Refills: 2 | OUTPATIENT
Start: 2024-09-16

## 2024-09-16 RX ORDER — GABAPENTIN 300 MG/1
600 CAPSULE ORAL 3 TIMES DAILY
Qty: 540 CAPSULE | Refills: 2 | Status: SHIPPED | OUTPATIENT
Start: 2024-09-16

## 2024-09-16 NOTE — TELEPHONE ENCOUNTER
----- Message from Saima Fernández sent at 9/16/2024 11:05 AM CDT -----  Who Called: LU RENAE [[id number]]              New Prescription or Refill : Refill        RX Name and Strength:  gabapentin (NEURONTIN) 300 MG capsule                  30 day or 90 day RX: 90 day               Local or Mail Order : mail order               Preferred Pharmacy: [facility] [address]            Would the patient rather a call back or a response via [facility]? Call back         Best Call Back Number:    [phone]        Additional Information: Please refax rx with duration. Pharmacy advised they do not receive it.

## 2024-09-22 DIAGNOSIS — M17.12 PRIMARY OSTEOARTHRITIS OF LEFT KNEE: ICD-10-CM

## 2024-09-22 DIAGNOSIS — M25.562 CHRONIC PAIN OF LEFT KNEE: ICD-10-CM

## 2024-09-22 DIAGNOSIS — G89.29 CHRONIC PAIN OF LEFT KNEE: ICD-10-CM

## 2024-09-22 DIAGNOSIS — G89.4 CHRONIC PAIN DISORDER: ICD-10-CM

## 2024-09-23 RX ORDER — GABAPENTIN 300 MG/1
600 CAPSULE ORAL 3 TIMES DAILY
Qty: 540 CAPSULE | Refills: 2 | Status: SHIPPED | OUTPATIENT
Start: 2024-09-23

## 2024-09-26 DIAGNOSIS — G89.4 CHRONIC PAIN SYNDROME: ICD-10-CM

## 2024-09-26 RX ORDER — OXYCODONE AND ACETAMINOPHEN 5; 325 MG/1; MG/1
1 TABLET ORAL EVERY 8 HOURS PRN
Qty: 90 TABLET | Refills: 0 | OUTPATIENT
Start: 2024-09-26 | End: 2024-10-26

## 2024-09-26 RX ORDER — OXYCODONE AND ACETAMINOPHEN 5; 325 MG/1; MG/1
1 TABLET ORAL EVERY 8 HOURS PRN
Qty: 90 TABLET | Refills: 0 | Status: SHIPPED | OUTPATIENT
Start: 2024-10-02 | End: 2024-11-01

## 2024-09-26 NOTE — TELEPHONE ENCOUNTER
----- Message from Irina Torrez sent at 9/26/2024  9:36 AM CDT -----  Regarding: refill  Name of caller:Ca       What is the requesting detail: pt is requesting a refill for oxyCODONE-acetaminophen (PERCOCET) 5-325 mg per tablet       [facility]          Can the clinic reply by [facility]:       What number to call back: [phone]

## 2024-09-26 NOTE — TELEPHONE ENCOUNTER
Patient requesting refill on oxycodone  Last office visit 09-6-24   shows last refill on 09/03/24  Patient does have a pain contract on file with [facility]  Patient last UDS 04-16-24 was consistent with current therapy     CODEINE   Not Detected Not Detected Not Detected   Comment: INTERPRETIVE INFORMATION: Codeine, U  Positive Cutoff: 40 ng/mL  Methodology: Mass Spectrometry   MORPHINE   Not Detected Not Detected Not Detected   Comment: INTERPRETIVE INFORMATION:Morphine, U  Positive Cutoff: 20 ng/mL  Methodology: Mass Spectrometry   6-ACETYLMORPHINE   Not Detected Not Detected Not Detected   Comment: INTERPRETIVE INFORMATION:6-acetylmorphine, U  Positive Cutoff: 20 ng/mL  Methodology: Mass Spectrometry   OXYCODONE   Present Present Present   Comment: INTERPRETIVE INFORMATION:Oxycodone, U  Positive Cutoff: 40 ng/mL  Methodology: Mass Spectrometry   NOROYXCODONE   Present Present Present   Comment: INTERPRETIVE INFORMATION:Noroxycodone, U  Positive Cutoff: 100 ng/mL  Methodology: Mass Spectrometry   OXYMORPHONE   Present Present Not Detected   Comment: INTERPRETIVE INFORMATION:Oxymorphone, U  Positive Cutoff: 40 ng/mL  Methodology: Mass Spectrometry   NOROXYMORPHONE   Present Present Not Detected   Comment: INTERPRETIVE INFORMATION:Noroxymorphone, U  Positive Cutoff: 100 ng/mL  Methodology: Mass Spectrometry   HYDROCODONE   Not Detected Not Detected Not Detected   Comment: INTERPRETIVE INFORMATION:Hydrocodone, U  Positive Cutoff: 40 ng/mL  Methodology: Mass Spectrometry   NORHYDROCODONE   Not Detected Not Detected Not Detected   Comment: INTERPRETIVE INFORMATION:Norhydrocodone, U  Positive Cutoff: 100 ng/mL  Methodology: Mass Spectrometry   HYDROMORPHONE   Not Detected Not Detected Not Detected   Comment: INTERPRETIVE INFORMATION:Hydromorphone, U  Positive Cutoff: 20 ng/mL  Methodology: Mass Spectrometry   BUPRENORPHINE   Not Detected Not Detected Not Detected   Comment:  INTERPRETIVE INFORMATION:Buprenorphine, U  Positive Cutoff: 5 ng/mL  Methodology: Mass Spectrometry   NORUBPRENORPHINE   Not Detected Not Detected Not Detected   Comment: INTERPRETIVE INFORMATION:Norbuprenorphine, U  Positive Cutoff: 20 ng/mL  Methodology: Mass Spectrometry   FENTANYL   Not Detected Not Detected Not Detected   Comment: INTERPRETIVE INFORMATION:Fentanyl, U  Positive Cutoff: 2 ng/mL  Methodology: Mass Spectrometry   NORFENTANYL   Not Detected Not Detected Not Detected   Comment: INTERPRETIVE INFORMATION:Norfentanyl, U  Positive Cutoff: 2 ng/mL  Methodology: Mass Spectrometry   MEPERIDINE METABOLITE   Not Detected Not Detected Not Detected   Comment: INTERPRETIVE INFORMATION:Meperidine metabolite, U  Positive Cutoff: 50 ng/mL  Methodology: Mass Spectrometry   TAPENTADOL   Not Detected Not Detected Not Detected   Comment: INTERPRETIVE INFORMATION:Tapentadol, U  Positive Cutoff: 100 ng/mL  Methodology: Mass Spectrometry   TAPENTADOL-O-SULF   Not Detected Not Detected Not Detected   Comment: INTERPRETIVE INFORMATION:Tapentadol-o-Sulf, U  Positive Cutoff: 200 ng/mL  Methodology: Mass Spectrometry   METHADONE   Negative Not Detected Not Detected   Comment: Presumptive negative by immunoassay. Testing by mass  spectrometry is available on request.  INTERPRETIVE INFORMATION: Methadone Screen, U  Positive Cutoff: 150 ng/mL  Methodology: Immunoassay   TRAMADOL   Negative Not Detected Not Detected   Comment: Presumptive negative by immunoassay. Testing by mass  spectrometry is available on request.  INTERPRETIVE INFORMATION:Tramadol Screen, U  Positive Cutoff: 100 ng/mL  Methodology: Immunoassay   AMPHETAMINE   Not Detected Not Detected Not Detected   Comment: INTERPRETIVE INFORMATION:Amphetamine, U  Positive Cutoff: 50 ng/mL  Methodology: Mass Spectrometry   METHAMPHETAMINE   Not Detected Not Detected Not Detected   Comment: INTERPRETIVE INFORMATION:Methamphetamine, U  Positive Cutoff: 200 ng/mL  Methodology:  Mass Spectrometry   MDMA- ECSTASY   Not Detected Not Detected Not Detected   Comment: INTERPRETIVE INFORMATION:MDMA, U  Positive Cutoff: 200 ng/mL  Methodology: Mass Spectrometry   MDA   Not Detected Not Detected Not Detected   Comment: INTERPRETIVE INFORMATION:MDA, U  Positive Cutoff: 200 ng/mL  Methodology: Mass Spectrometry   MDEA- Genoveva   Not Detected Not Detected Not Detected   Comment: INTERPRETIVE INFORMATION:MDEA, U  Positive Cutoff: 200 ng/mL  Methodology: Mass Spectrometry   METHYLPHENIDATE   Not Detected Not Detected Not Detected   Comment: INTERPRETIVE INFORMATION:Methylphenidate, U  Positive Cutoff: 100 ng/mL  Methodology: Mass Spectrometry   PHENTERMINE   Not Detected Not Detected Not Detected   Comment: INTERPRETIVE INFORMATION:Phentermine, U  Positive Cutoff: 100 ng/mL  Methodology: Mass Spectrometry   BENZOYLECGONINE   Negative Not Detected Not Detected   Comment: Presumptive negative by immunoassay. Testing by mass  spectrometry is available on request.  INTERPRETIVE INFORMATION:Cocaine Screen, U  Positive Cutoff: 150 ng/mL  Methodology: Immunoassay   ALPRAZOLAM   Not Detected Not Detected Not Detected   Comment: INTERPRETIVE INFORMATION:Alprazolam, U  Positive Cutoff: 40 ng/mL  Methodology: Mass Spectrometry   ALPHA-OH-ALPRAZOLAM   Not Detected Not Detected Not Detected   Comment: INTERPRETIVE INFORMATION:Alpha-OH-Alprazolam, U  Positive Cutoff: 20 ng/mL  Methodology: Mass Spectrometry   CLONAZEPAM   Not Detected Not Detected Not Detected   Comment: INTERPRETIVE INFORMATION:Clonazepam, U  Positive Cutoff: 20 ng/mL  Methodology: Mass Spectrometry   7-AMINOCLONAZEPAM   Not Detected Not Detected Not Detected   Comment: INTERPRETIVE INFORMATION:7-Aminoclonazepam, U  Positive Cutoff: 40 ng/mL  Methodology: Mass Spectrometry   DIAZEPAM   Not Detected Not Detected Not Detected   Comment: INTERPRETIVE INFORMATION:Diazepam, U  Positive Cutoff: 50 ng/mL  Methodology: Mass Spectrometry   NORDIAZEPAM   Not  Detected Not Detected Not Detected   Comment: INTERPRETIVE INFORMATION:Nordiazepam, U  Positive Cutoff: 50 ng/mL  Methodology: Mass Spectrometry   OXAZEPAM   Not Detected Not Detected Not Detected   Comment: INTERPRETIVE INFORMATION:Oxazepam, U  Positive Cutoff: 50 ng/mL  Methodology: Mass Spectrometry   TEMAZEPAM   Not Detected Not Detected Not Detected   Comment: INTERPRETIVE INFORMATION:Temazepam, U  Positive Cutoff: 50 ng/mL  Methodology: Mass Spectrometry   Lorazepam   Not Detected Not Detected Not Detected   Comment: INTERPRETIVE INFORMATION:Lorazepam, U  Positive Cutoff: 60 ng/mL  Methodology: Mass Spectrometry   MIDAZOLAM   Not Detected Not Detected Not Detected   Comment: INTERPRETIVE INFORMATION:Midazolam, U  Positive Cutoff: 20 ng/mL  Methodology: Mass Spectrometry   ZOLPIDEM   Not Detected Not Detected Not Detected   Comment: INTERPRETIVE INFORMATION:Zolpidem, U  Positive Cutoff: 20 ng/mL  Methodology: Mass Spectrometry   BARBITURATES   Negative Not Detected Not Detected   Comment: Presumptive negative by immunoassay. Testing by mass  spectrometry is available on request.  INTERPRETIVE INFORMATION:Barbiturates Screen, U  Positive Cutoff: 200 ng/mL  Methodology: Immunoassay   Creatinine, Urine 20.0 - 400.0 mg/dL 102.8 205.6 102.0   ETHYL GLUCURONIDE   Negative Present Not Detected   Comment: Presumptive negative by immunoassay. Testing by mass  spectrometry is available on request.  INTERPRETIVE INFORMATION:Ethyl Glucuronide Screen, U  Positive Cutoff: 500 ng/mL  Methodology: Immunoassay   MARIJUANA METABOLITE   Negative Not Detected Not Detected   Comment: Presumptive negative by immunoassay. Testing by mass  spectrometry is available on request.  INTERPRETIVE INFORMATION: THC (Cannabinoids) Screen, U  Positive Cutoff: 50 ng/mL  Methodology: Immunoassay   PCP   Negative Not Detected Not Detected   Comment: Presumptive negative by immunoassay. Testing by mass  spectrometry is available on  request.  INTERPRETIVE INFORMATION:Phencyclidine Screen, U  Positive Cutoff: 25 ng/mL  Methodology: Immunoassay   CARISOPRODOL   Negative Not Detected CM Not Detected CM   Comment: Presumptive negative by immunoassay. Testing by mass  spectrometry is available on request.  INTERPRETIVE INFORMATION: Carisoprodol Screen, U  Positive Cutoff: 100 ng/mL  Methodology: Immunoassay  The carisoprodol immunoassay has cross-reactivity to  carisoprodol and meprobamate.   Naloxone   Not Detected Not Detected     Comment: INTERPRETIVE INFORMATION:Naloxone, U  Positive Cutoff: 100 ng/mL  Methodology: Mass Spectrometry   Gabapentin   Present Present     Comment: INTERPRETIVE INFORMATION:Gabapentin, U  Positive Cutoff: 3,000 ng/mL  Methodology: Mass Spectrometry   Pregabalin   Not Detected Not Detected     Comment: INTERPRETIVE INFORMATION:Pregabalin, U  Positive Cutoff: 3,000 ng/mL  Methodology: Mass Spectrometry   Alpha-OH-Midazolam   Not Detected Not Detected     Comment: INTERPRETIVE INFORMATION:Alpha-OH-Midazolam, U  Positive Cutoff: 20 ng/mL  Methodology: Mass Spectrometry   Zolpidem Metabolite   Not Detected Not Detected     Comment: INTERPRETIVE INFORMATION:Zolpidem Metabolite, U  Positive Cutoff: 100 ng/mL  Methodology: Mass Spectrometry   PAIN MANAGEMENT DRUG PANEL   See Below See Below CM

## 2024-09-30 ENCOUNTER — TELEPHONE (OUTPATIENT)
Dept: NEUROSURGERY | Facility: CLINIC | Age: 74
End: 2024-09-30
Payer: MEDICARE

## 2024-09-30 ENCOUNTER — PATIENT MESSAGE (OUTPATIENT)
Dept: INTERVENTIONAL RADIOLOGY/VASCULAR | Facility: HOSPITAL | Age: 74
End: 2024-09-30
Payer: MEDICARE

## 2024-09-30 ENCOUNTER — TELEPHONE (OUTPATIENT)
Dept: PULMONOLOGY | Facility: CLINIC | Age: 74
End: 2024-09-30
Payer: MEDICARE

## 2024-09-30 DIAGNOSIS — I67.1 CEREBRAL ANEURYSM: Primary | ICD-10-CM

## 2024-09-30 NOTE — TELEPHONE ENCOUNTER
----- Message from Rossana Motley MD sent at 9/27/2024  5:08 PM CDT -----  Yes maam. History of a Stage I cancer in 2021, stable nodules, I think surveillance is appropriate.    Rossana  ----- Message -----  From: Mago Crook MD  Sent: 9/27/2024   2:52 PM CDT  To: Rossana Motley MD    These nodules are EXTREMELY tiny.  Are you comfortable with continued surveillance?  Mago

## 2024-09-30 NOTE — TELEPHONE ENCOUNTER
Patient with gastric carcinoma AND a history of early breast cancer.  IR biopsy is scheduled for this week.    Explained to patient the rationale to be certain.    Will call her with results.

## 2024-09-30 NOTE — TELEPHONE ENCOUNTER
Returned call to Lillian. Explained pt was told to obtain preclearance from pcp. Have not heard anything back. Also, will need angio for [place]. Once scheduled will let them know. Martinez gamble.

## 2024-09-30 NOTE — TELEPHONE ENCOUNTER
----- Message from Donnroxie sent at 9/30/2024 12:57 PM CDT -----  Regarding: Retutning Call  Contact: [phone]  Type:  Patient Returning Call    Who Called: PT daughter Lillian     Who Left Message for Patient:Margarita     Does the patient know what this is regarding?: Request call back.     Would the patient rather a call back or a response via [facility]? Call     Best Call Back Number [phone]

## 2024-10-01 ENCOUNTER — TELEPHONE (OUTPATIENT)
Dept: INTERNAL MEDICINE | Facility: CLINIC | Age: 74
End: 2024-10-01
Payer: MEDICARE

## 2024-10-01 ENCOUNTER — DOCUMENTATION ONLY (OUTPATIENT)
Dept: PREADMISSION TESTING | Facility: HOSPITAL | Age: 74
End: 2024-10-01
Payer: MEDICARE

## 2024-10-01 NOTE — PRE-PROCEDURE INSTRUCTIONS
PRE-OP INSTRUCTIONS:  Instructed patient to have no food,milk or milk products after midnight   It is ok to take AM medications with a few sips of water   Medication instructions for pm prior to and am of surgery reviewed.  Instructed patient to avoid taking vitamins,supplements,aspirin or ibuprofen the am of surgery.  Shower instructions provided    Patient denies any side effects or issues with anesthesia or sedation.     PT HAS PORT RIGHT CHEST

## 2024-10-01 NOTE — TELEPHONE ENCOUNTER
Hi, I just read Dr. Simon's note from August and he did want her to come in to see me for surgery clearance.  Please let the patient know she should keep the appointment with me.  Let me know if patient has any other questions.  Thank you, Lei Garcia

## 2024-10-01 NOTE — TELEPHONE ENCOUNTER
----- Message from Med Assistant Huynh sent at 9/30/2024  2:35 PM CDT -----  Contact: [phone]    ----- Message -----  From: Levon Gonzalez  Sent: 9/30/2024   2:11 PM CDT  To: Radha Odom Staff    [email or web address]     Patient is calling for Medical Advice regarding: If doctor can send clearance over to Dr. Sejal Simon so she doesn't have to cme in for her appt on 10/14/24    Patient wants a call back or thru [facility]: call    Comments:    Please advise patient replies from provider may take up to 48 hours.

## 2024-10-03 ENCOUNTER — HOSPITAL ENCOUNTER (OUTPATIENT)
Dept: RADIOLOGY | Facility: HOSPITAL | Age: 74
Discharge: HOME | End: 2024-10-03
Attending: INTERNAL MEDICINE
Payer: MEDICARE

## 2024-10-03 ENCOUNTER — HOSPITAL ENCOUNTER (OUTPATIENT)
Dept: INTERVENTIONAL RADIOLOGY/VASCULAR | Facility: HOSPITAL | Age: 74
Discharge: HOME | End: 2024-10-04
Attending: FAMILY MEDICINE | Admitting: FAMILY MEDICINE
Payer: MEDICARE

## 2024-10-03 ENCOUNTER — ANESTHESIA EVENT (OUTPATIENT)
Dept: INTERVENTIONAL RADIOLOGY/VASCULAR | Facility: HOSPITAL | Age: 74
End: 2024-10-03
Payer: MEDICARE

## 2024-10-03 DIAGNOSIS — C16.9 GASTRIC ADENOCARCINOMA: ICD-10-CM

## 2024-10-03 DIAGNOSIS — R91.8 PULMONARY NODULES/LESIONS, MULTIPLE: ICD-10-CM

## 2024-10-03 DIAGNOSIS — R07.9 CHEST PAIN: ICD-10-CM

## 2024-10-03 DIAGNOSIS — D49.9 NEOPLASM: ICD-10-CM

## 2024-10-03 PROBLEM — I10 ESSENTIAL HYPERTENSION: Chronic | Status: ACTIVE | Noted: 2019-04-29

## 2024-10-03 PROBLEM — J98.4 LUNG DISEASE: Chronic | Status: ACTIVE | Noted: 2024-10-03

## 2024-10-03 PROBLEM — E66.01 MORBID OBESITY WITH BMI OF 45.0-49.9, ADULT: Chronic | Status: ACTIVE | Noted: 2019-04-29

## 2024-10-03 PROBLEM — G89.29 CHRONIC PAIN: Chronic | Status: ACTIVE | Noted: 2021-07-30

## 2024-10-03 PROBLEM — G47.33 OBSTRUCTIVE SLEEP APNEA: Chronic | Status: ACTIVE | Noted: 2018-08-09

## 2024-10-03 PROBLEM — R09.02 HYPOXIA: Status: ACTIVE | Noted: 2024-10-03

## 2024-10-03 PROBLEM — D50.0 IRON DEFICIENCY ANEMIA SECONDARY TO BLOOD LOSS (CHRONIC): Chronic | Status: ACTIVE | Noted: 2021-07-21

## 2024-10-03 PROBLEM — F11.20 UNCOMPLICATED OPIOID DEPENDENCE: Chronic | Status: ACTIVE | Noted: 2021-07-30

## 2024-10-03 LAB
ALLENS TEST: ABNORMAL
DELSYS: ABNORMAL
FIO2: 21
HCO3 UR-SCNC: 27.4 MMOL/L (ref 24–28)
HCT VFR BLD CALC: 38 %PCV (ref 36–54)
MODE: ABNORMAL
PCO2 BLDA: 46.5 MMHG (ref 35–45)
PH SMN: 7.38 [PH] (ref 7.35–7.45)
PO2 BLDA: 63 MMHG (ref 80–100)
POC BE: 2 MMOL/L
POC SATURATED O2: 91 % (ref 95–100)
POC TCO2: 29 MMOL/L (ref 23–27)
SAMPLE: ABNORMAL
SITE: ABNORMAL
SP02: 91

## 2024-10-03 PROCEDURE — 63600175 PHARM REV CODE 636 W HCPCS: Performed by: STUDENT IN AN ORGANIZED HEALTH CARE EDUCATION/TRAINING PROGRAM

## 2024-10-03 PROCEDURE — G0378 HOSPITAL OBSERVATION PER HR: HCPCS

## 2024-10-03 PROCEDURE — 88305 TISSUE EXAM BY PATHOLOGIST: CPT | Mod: 26,,, | Performed by: STUDENT IN AN ORGANIZED HEALTH CARE EDUCATION/TRAINING PROGRAM

## 2024-10-03 PROCEDURE — 25000003 PHARM REV CODE 250: Performed by: STUDENT IN AN ORGANIZED HEALTH CARE EDUCATION/TRAINING PROGRAM

## 2024-10-03 PROCEDURE — 99900035 HC TECH TIME PER 15 MIN (STAT)

## 2024-10-03 PROCEDURE — 63600175 PHARM REV CODE 636 W HCPCS: Performed by: NURSE ANESTHETIST, CERTIFIED REGISTERED

## 2024-10-03 PROCEDURE — 88342 IMHCHEM/IMCYTCHM 1ST ANTB: CPT | Mod: 26,,, | Performed by: STUDENT IN AN ORGANIZED HEALTH CARE EDUCATION/TRAINING PROGRAM

## 2024-10-03 PROCEDURE — 88305 TISSUE EXAM BY PATHOLOGIST: CPT | Performed by: STUDENT IN AN ORGANIZED HEALTH CARE EDUCATION/TRAINING PROGRAM

## 2024-10-03 PROCEDURE — 71045 X-RAY EXAM CHEST 1 VIEW: CPT | Mod: TC

## 2024-10-03 PROCEDURE — 82803 BLOOD GASES ANY COMBINATION: CPT

## 2024-10-03 PROCEDURE — G0379 DIRECT REFER HOSPITAL OBSERV: HCPCS

## 2024-10-03 PROCEDURE — 88341 IMHCHEM/IMCYTCHM EA ADD ANTB: CPT | Mod: 26,,, | Performed by: STUDENT IN AN ORGANIZED HEALTH CARE EDUCATION/TRAINING PROGRAM

## 2024-10-03 PROCEDURE — 37000009 HC ANESTHESIA EA ADD 15 MINS

## 2024-10-03 PROCEDURE — 71045 X-RAY EXAM CHEST 1 VIEW: CPT | Mod: 26,76,, | Performed by: RADIOLOGY

## 2024-10-03 PROCEDURE — 96374 THER/PROPH/DIAG INJ IV PUSH: CPT

## 2024-10-03 PROCEDURE — 88333 PATH CONSLTJ SURG CYTO XM 1: CPT | Mod: 26,,, | Performed by: STUDENT IN AN ORGANIZED HEALTH CARE EDUCATION/TRAINING PROGRAM

## 2024-10-03 PROCEDURE — 32408 CORE NDL BX LNG/MED PERQ: CPT | Performed by: INTERNAL MEDICINE

## 2024-10-03 PROCEDURE — 88342 IMHCHEM/IMCYTCHM 1ST ANTB: CPT | Performed by: STUDENT IN AN ORGANIZED HEALTH CARE EDUCATION/TRAINING PROGRAM

## 2024-10-03 PROCEDURE — 37000008 HC ANESTHESIA 1ST 15 MINUTES

## 2024-10-03 PROCEDURE — 25000003 PHARM REV CODE 250: Performed by: NURSE ANESTHETIST, CERTIFIED REGISTERED

## 2024-10-03 PROCEDURE — 36600 WITHDRAWAL OF ARTERIAL BLOOD: CPT

## 2024-10-03 PROCEDURE — 88341 IMHCHEM/IMCYTCHM EA ADD ANTB: CPT | Mod: 59 | Performed by: STUDENT IN AN ORGANIZED HEALTH CARE EDUCATION/TRAINING PROGRAM

## 2024-10-03 PROCEDURE — 71045 X-RAY EXAM CHEST 1 VIEW: CPT | Mod: 26,,, | Performed by: RADIOLOGY

## 2024-10-03 PROCEDURE — 88333 PATH CONSLTJ SURG CYTO XM 1: CPT | Performed by: STUDENT IN AN ORGANIZED HEALTH CARE EDUCATION/TRAINING PROGRAM

## 2024-10-03 PROCEDURE — 94761 N-INVAS EAR/PLS OXIMETRY MLT: CPT

## 2024-10-03 RX ORDER — LIDOCAINE HYDROCHLORIDE 10 MG/ML
1 INJECTION, SOLUTION EPIDURAL; INFILTRATION; INTRACAUDAL; PERINEURAL ONCE
Status: DISCONTINUED | OUTPATIENT
Start: 2024-10-03 | End: 2024-10-04 | Stop reason: HOSPADM

## 2024-10-03 RX ORDER — LIDOCAINE HYDROCHLORIDE 20 MG/ML
INJECTION, SOLUTION EPIDURAL; INFILTRATION; INTRACAUDAL; PERINEURAL
Status: DISCONTINUED | OUTPATIENT
Start: 2024-10-03 | End: 2024-10-03

## 2024-10-03 RX ORDER — GABAPENTIN 300 MG/1
600 CAPSULE ORAL 3 TIMES DAILY
Status: DISCONTINUED | OUTPATIENT
Start: 2024-10-03 | End: 2024-10-04 | Stop reason: HOSPADM

## 2024-10-03 RX ORDER — ACETAMINOPHEN 325 MG/1
650 TABLET ORAL EVERY 8 HOURS PRN
Status: DISCONTINUED | OUTPATIENT
Start: 2024-10-03 | End: 2024-10-04 | Stop reason: HOSPADM

## 2024-10-03 RX ORDER — POLYETHYLENE GLYCOL 3350 17 G/17G
17 POWDER, FOR SOLUTION ORAL DAILY PRN
Status: DISCONTINUED | OUTPATIENT
Start: 2024-10-03 | End: 2024-10-04 | Stop reason: HOSPADM

## 2024-10-03 RX ORDER — IBUPROFEN 200 MG
24 TABLET ORAL
Status: DISCONTINUED | OUTPATIENT
Start: 2024-10-03 | End: 2024-10-04 | Stop reason: HOSPADM

## 2024-10-03 RX ORDER — GLUCAGON 1 MG
1 KIT INJECTION
Status: DISCONTINUED | OUTPATIENT
Start: 2024-10-03 | End: 2024-10-04 | Stop reason: HOSPADM

## 2024-10-03 RX ORDER — FENTANYL CITRATE 50 UG/ML
INJECTION, SOLUTION INTRAMUSCULAR; INTRAVENOUS
Status: DISCONTINUED | OUTPATIENT
Start: 2024-10-03 | End: 2024-10-03

## 2024-10-03 RX ORDER — AMLODIPINE BESYLATE 10 MG/1
10 TABLET ORAL DAILY
Status: DISCONTINUED | OUTPATIENT
Start: 2024-10-04 | End: 2024-10-04 | Stop reason: HOSPADM

## 2024-10-03 RX ORDER — OXYCODONE AND ACETAMINOPHEN 5; 325 MG/1; MG/1
1 TABLET ORAL EVERY 8 HOURS PRN
Status: DISCONTINUED | OUTPATIENT
Start: 2024-10-03 | End: 2024-10-04 | Stop reason: HOSPADM

## 2024-10-03 RX ORDER — ATORVASTATIN CALCIUM 20 MG/1
20 TABLET, FILM COATED ORAL DAILY
Status: DISCONTINUED | OUTPATIENT
Start: 2024-10-04 | End: 2024-10-04 | Stop reason: HOSPADM

## 2024-10-03 RX ORDER — ACETAMINOPHEN 325 MG/1
650 TABLET ORAL EVERY 4 HOURS PRN
Status: DISCONTINUED | OUTPATIENT
Start: 2024-10-03 | End: 2024-10-04 | Stop reason: HOSPADM

## 2024-10-03 RX ORDER — PHENYLEPHRINE HYDROCHLORIDE 10 MG/ML
INJECTION INTRAVENOUS
Status: DISCONTINUED | OUTPATIENT
Start: 2024-10-03 | End: 2024-10-03

## 2024-10-03 RX ORDER — FERROUS GLUCONATE 324(37.5)
324 TABLET ORAL
Status: DISCONTINUED | OUTPATIENT
Start: 2024-10-04 | End: 2024-10-04 | Stop reason: HOSPADM

## 2024-10-03 RX ORDER — IBUPROFEN 200 MG
16 TABLET ORAL
Status: DISCONTINUED | OUTPATIENT
Start: 2024-10-03 | End: 2024-10-04 | Stop reason: HOSPADM

## 2024-10-03 RX ORDER — SODIUM CHLORIDE 0.9 % (FLUSH) 0.9 %
1-10 SYRINGE (ML) INJECTION EVERY 12 HOURS PRN
Status: DISCONTINUED | OUTPATIENT
Start: 2024-10-03 | End: 2024-10-04 | Stop reason: HOSPADM

## 2024-10-03 RX ORDER — HYDROMORPHONE HYDROCHLORIDE 1 MG/ML
0.2 INJECTION, SOLUTION INTRAMUSCULAR; INTRAVENOUS; SUBCUTANEOUS EVERY 5 MIN PRN
Status: DISCONTINUED | OUTPATIENT
Start: 2024-10-03 | End: 2024-10-03

## 2024-10-03 RX ORDER — ONDANSETRON HYDROCHLORIDE 2 MG/ML
INJECTION, SOLUTION INTRAVENOUS
Status: DISCONTINUED | OUTPATIENT
Start: 2024-10-03 | End: 2024-10-03

## 2024-10-03 RX ORDER — ROCURONIUM BROMIDE 10 MG/ML
INJECTION, SOLUTION INTRAVENOUS
Status: DISCONTINUED | OUTPATIENT
Start: 2024-10-03 | End: 2024-10-03

## 2024-10-03 RX ORDER — ALUMINUM HYDROXIDE, MAGNESIUM HYDROXIDE, AND SIMETHICONE 1200; 120; 1200 MG/30ML; MG/30ML; MG/30ML
30 SUSPENSION ORAL 4 TIMES DAILY PRN
Status: DISCONTINUED | OUTPATIENT
Start: 2024-10-03 | End: 2024-10-04 | Stop reason: HOSPADM

## 2024-10-03 RX ORDER — ASPIRIN 325 MG
325 TABLET, DELAYED RELEASE (ENTERIC COATED) ORAL DAILY
Status: DISCONTINUED | OUTPATIENT
Start: 2024-10-04 | End: 2024-10-04 | Stop reason: HOSPADM

## 2024-10-03 RX ORDER — PROPOFOL 10 MG/ML
VIAL (ML) INTRAVENOUS
Status: DISCONTINUED | OUTPATIENT
Start: 2024-10-03 | End: 2024-10-03

## 2024-10-03 RX ORDER — SUCCINYLCHOLINE CHLORIDE 20 MG/ML
INJECTION INTRAMUSCULAR; INTRAVENOUS
Status: DISCONTINUED | OUTPATIENT
Start: 2024-10-03 | End: 2024-10-03

## 2024-10-03 RX ORDER — SIMETHICONE 80 MG
1 TABLET,CHEWABLE ORAL 4 TIMES DAILY PRN
Status: DISCONTINUED | OUTPATIENT
Start: 2024-10-03 | End: 2024-10-04 | Stop reason: HOSPADM

## 2024-10-03 RX ORDER — IBUPROFEN 200 MG
1 TABLET ORAL DAILY PRN
Status: DISCONTINUED | OUTPATIENT
Start: 2024-10-03 | End: 2024-10-04 | Stop reason: HOSPADM

## 2024-10-03 RX ORDER — ONDANSETRON 8 MG/1
8 TABLET, ORALLY DISINTEGRATING ORAL EVERY 8 HOURS PRN
Status: DISCONTINUED | OUTPATIENT
Start: 2024-10-03 | End: 2024-10-04 | Stop reason: HOSPADM

## 2024-10-03 RX ORDER — FUROSEMIDE 10 MG/ML
40 INJECTION INTRAMUSCULAR; INTRAVENOUS ONCE
Status: COMPLETED | OUTPATIENT
Start: 2024-10-03 | End: 2024-10-03

## 2024-10-03 RX ORDER — LISINOPRIL 20 MG/1
40 TABLET ORAL NIGHTLY
Status: DISCONTINUED | OUTPATIENT
Start: 2024-10-04 | End: 2024-10-04 | Stop reason: HOSPADM

## 2024-10-03 RX ORDER — SODIUM CHLORIDE 0.9 % (FLUSH) 0.9 %
10 SYRINGE (ML) INJECTION
Status: DISCONTINUED | OUTPATIENT
Start: 2024-10-03 | End: 2024-10-04 | Stop reason: HOSPADM

## 2024-10-03 RX ORDER — NALOXONE HCL 0.4 MG/ML
0.02 VIAL (ML) INJECTION
Status: DISCONTINUED | OUTPATIENT
Start: 2024-10-03 | End: 2024-10-04 | Stop reason: HOSPADM

## 2024-10-03 RX ORDER — CITALOPRAM 10 MG/1
10 TABLET ORAL DAILY
Status: DISCONTINUED | OUTPATIENT
Start: 2024-10-04 | End: 2024-10-04 | Stop reason: HOSPADM

## 2024-10-03 RX ORDER — SODIUM CHLORIDE 9 MG/ML
INJECTION, SOLUTION INTRAVENOUS CONTINUOUS
Status: DISCONTINUED | OUTPATIENT
Start: 2024-10-03 | End: 2024-10-03

## 2024-10-03 RX ORDER — TALC
6 POWDER (GRAM) TOPICAL NIGHTLY PRN
Status: DISCONTINUED | OUTPATIENT
Start: 2024-10-03 | End: 2024-10-04 | Stop reason: HOSPADM

## 2024-10-03 RX ADMIN — ONDANSETRON 4 MG: 2 INJECTION INTRAMUSCULAR; INTRAVENOUS at 11:10

## 2024-10-03 RX ADMIN — SUGAMMADEX 400 MG: 100 INJECTION, SOLUTION INTRAVENOUS at 11:10

## 2024-10-03 RX ADMIN — ROCURONIUM BROMIDE 40 MG: 10 INJECTION INTRAVENOUS at 10:10

## 2024-10-03 RX ADMIN — LIDOCAINE HYDROCHLORIDE 100 MG: 20 INJECTION, SOLUTION EPIDURAL; INFILTRATION; INTRACAUDAL; PERINEURAL at 10:10

## 2024-10-03 RX ADMIN — FENTANYL CITRATE 50 MCG: 50 INJECTION, SOLUTION INTRAMUSCULAR; INTRAVENOUS at 10:10

## 2024-10-03 RX ADMIN — SODIUM CHLORIDE: 0.9 INJECTION, SOLUTION INTRAVENOUS at 10:10

## 2024-10-03 RX ADMIN — PHENYLEPHRINE HYDROCHLORIDE 100 MCG: 10 INJECTION INTRAVENOUS at 11:10

## 2024-10-03 RX ADMIN — PHENYLEPHRINE HYDROCHLORIDE 100 MCG: 10 INJECTION INTRAVENOUS at 10:10

## 2024-10-03 RX ADMIN — PROPOFOL 140 MG: 10 INJECTION, EMULSION INTRAVENOUS at 10:10

## 2024-10-03 RX ADMIN — OXYCODONE HYDROCHLORIDE AND ACETAMINOPHEN 1 TABLET: 5; 325 TABLET ORAL at 10:10

## 2024-10-03 RX ADMIN — SUCCINYLCHOLINE CHLORIDE 120 MG: 20 INJECTION, SOLUTION INTRAMUSCULAR; INTRAVENOUS at 10:10

## 2024-10-03 RX ADMIN — FUROSEMIDE 40 MG: 10 INJECTION, SOLUTION INTRAVENOUS at 10:10

## 2024-10-03 NOTE — TRANSFER OF CARE
"Anesthesia Transfer of Care Note    Patient: Ca Coats    Procedure(s) Performed: * No procedures listed *    Patient location: PACU    Anesthesia Type: general    Transport from OR: Transported from OR on 6-10 L/min O2 by face mask with adequate spontaneous ventilation    Post pain: adequate analgesia    Post assessment: no apparent anesthetic complications and tolerated procedure well    Post vital signs: stable    Level of consciousness: awake    Nausea/Vomiting: no nausea/vomiting    Complications: none    Transfer of care protocol was followed      Last vitals: Visit Vitals  BP (!) 178/80 (BP Location: Right leg, Patient Position: Lying)   Pulse 78   Temp 36.4 °C (97.5 °F) (Temporal)   Resp 12   Ht 5' 6" (1.676 m)   Wt 119.7 kg (264 lb)   SpO2 100%   Breastfeeding No   BMI 42.61 kg/m²     "

## 2024-10-03 NOTE — PROCEDURES
VIR Procedure Note    Pre Op Diagnosis:  Left lung nodule    Post Op Diagnosis:  Same    Procedure:  CT guided lung biopsy    Procedure performed by:  Ravi Nogueira MD    Written Informed Consent Obtained:  Yes    Specimen Removed:  Yes; core lung samples    Estimated Blood Loss: minimal    Findings:     CT guided biopsy of left lung nodule using 19/20g coaxial system. 5 samples were sent to pathology.        Ravi Nogueira MD  VIR

## 2024-10-03 NOTE — PROGRESS NOTES
Notified Dr. Nogueira regarding patient 02 stats regarding patient unable to hold stats above 89 without oxygen. Placed patient on 2L via NC. Denies SOB. Resp even and unlabored.

## 2024-10-03 NOTE — ANESTHESIA POSTPROCEDURE EVALUATION
Anesthesia Post Evaluation    Patient: Ca Coats    Procedure(s) Performed: * No procedures listed *    Final Anesthesia Type: general      Patient location during evaluation: PACU  Patient participation: Yes- Able to Participate  Level of consciousness: awake  Post-procedure vital signs: reviewed and stable  Pain management: adequate  Airway patency: patent    PONV status at discharge: No PONV  Anesthetic complications: no      Cardiovascular status: blood pressure returned to baseline  Respiratory status: unassisted, spontaneous ventilation and room air                Vitals Value Taken Time   /57 10/03/24 1402   Temp 36.4 °C (97.5 °F) 10/03/24 1142   Pulse 73 10/03/24 1412   Resp 27 10/03/24 1412   SpO2 96 % 10/03/24 1412   Vitals shown include unfiled device data.      No case tracking events are documented in the log.      Pain/Hector Score: Hector Score: 9 (10/3/2024 12:30 PM)

## 2024-10-03 NOTE — DISCHARGE INSTRUCTIONS
Please call with any questions or concerns.      Monday thru Friday 8:00 am - 4:30 pm    Interventional Radiology   [phone]    After Hours    Ask for the Radiology Intern on call  [phone]

## 2024-10-03 NOTE — ANESTHESIA PREPROCEDURE EVALUATION
Pre-operative evaluation for * No procedures listed *    Ca Coats is a 73 y.o. female w/ morbid obesity (BMI 42, on Ozempic - held appropriately), HTN, LAURA on CPAP, pulmHTN, hx of DVT (no longer on Eliquis), CHF, cerebral aneurysms, and hx gastric cancer status post chemoradiation who was found to have several lung nodules on recent imaging. She presents today for IR guided left lung nodule biopsy.   She has poor mobility, mostly limited by knee pain, and uses a cane to ambulate.    Prev airway (6/6/2024):     Induction:  Intravenous    Intubated:  Postinduction    Mask Ventilation:  Easy with oral airway    Attempts:  1    Attempted By:  CRNA    Method of Intubation:  Video laryngoscopy    Blade:  Hernandez 3    Laryngeal View Grade: Grade I - full view of cords      Difficult Airway Encountered?: No      Complications:  None    Airway Device:  Oral endotracheal tube    Airway Device Size:  7.5    Style/Cuff Inflation:  Cuffed (inflated to minimal occlusive pressure)    Tube secured:  22    Secured at:  The lips    Placement Verified By:  Capnometry    Complicating Factors:  None    Findings Post-Intubation:  BS equal bilateral and atraumatic/condition of teeth unchanged      2D Echo (2/9/2022):   The left ventricle is normal in size with normal systolic function. The estimated ejection fraction is 65%.  Grade II left ventricular diastolic dysfunction.  Normal right ventricular size with normal right ventricular systolic function.  Biatrial enlargement.  Mild tricuspid regurgitation.  The estimated PA systolic pressure is 41 mmHg.  There is pulmonary hypertension.  Normal central venous pressure (3 mmHg).  There is mobile annular calcifications attached to the nikhil-lateral part of annulus of the valve ( image 46). Clinical correlation is required.      EKG (1/18/24):   Vent. Rate : 071 BPM     Atrial Rate : 071 BPM      P-R Int : 194 ms          QRS Dur : 074 ms       QT Int : 416 ms        P-R-T Axes : 057 -13 025 degrees      QTc Int : 452 ms     Normal sinus rhythm   Low voltage QRS in the precordial leads   Nonspecific ST and/or T wave abnormalities       Patient Active Problem List   Diagnosis    Primary osteoarthritis of left knee    Obstructive sleep apnea    Chronic knee pain    Left knee DJD    Essential hypertension    Major depressive disorder    Morbid obesity with BMI of 45.0-49.9, adult    Joint pain    Gait instability    At high risk for injury related to fall    Debility    Hypothyroidism    Left knee pain    Osteopenia of neck of left femur    Gastroesophageal reflux disease    Chronic diastolic congestive heart failure    Pure hypercholesterolemia    Tobacco abuse    Abnormal cardiovascular stress test    Gastric adenocarcinoma    Iron deficiency anemia secondary to blood loss (chronic)    Chronic pain    History of DVT (deep vein thrombosis)    Current mild episode of major depressive disorder without prior episode    DVT of deep femoral vein, right    Atherosclerosis of aorta    Nervous    Anemia    Edema    Postural dizziness    Constipation    History of Clostridium difficile infection    Opioid use    Impaired functional mobility, balance, gait, and endurance    Decreased range of motion of left knee    Muscle weakness    s/p hscope/D&C/polypectomy    Dizziness    Neuropathy    Pulmonary nodules/lesions, multiple       Review of patient's allergies indicates:  No Known Allergies    Past Surgical History:   Procedure Laterality Date    APPENDECTOMY      CATARACT EXTRACTION W/  INTRAOCULAR LENS IMPLANT Bilateral     MFIOL OU    CORONARY ANGIOGRAPHY Bilateral 02/24/2021    Procedure: ANGIOGRAM, CORONARY ARTERY;  Surgeon: Cresencio Ridley MD;  Location: [facility] CATH LAB;  Service: Cardiology;  Laterality: Bilateral;  Covid test needed - ok per Danay MORENOU. Pt. w/o transportation or family    ENDOSCOPIC ULTRASOUND OF UPPER GASTROINTESTINAL  TRACT N/A 03/17/2021    Procedure: ULTRASOUND, UPPER GI TRACT, ENDOSCOPIC;  Surgeon: Gerald Anaya MD;  Location: [facility] (2ND FLR);  Service: Endoscopy;  Laterality: N/A;  Pt unable to get a ride for swab. Will do rapid test at 8:30 - ttr    ENDOSCOPIC ULTRASOUND OF UPPER GASTROINTESTINAL TRACT N/A 01/13/2022    Procedure: ULTRASOUND, UPPER GI TRACT, ENDOSCOPIC;  Surgeon: Kevin Carballo MD;  Location: [facility] (2ND FLR);  Service: Endoscopy;  Laterality: N/A;  blood thinner approval received, see telephone encounter 1/7/22-BB  rapid  instructions given verbally and sent to address on file in pt's chart-BB    ENDOSCOPIC ULTRASOUND OF UPPER GASTROINTESTINAL TRACT N/A 10/11/2022    Procedure: ULTRASOUND, UPPER GI TRACT, ENDOSCOPIC;  Surgeon: Dequan Ridley MD;  Location: [facility] (2ND FLR);  Service: Endoscopy;  Laterality: N/A;    ENDOSCOPIC ULTRASOUND OF UPPER GASTROINTESTINAL TRACT N/A 01/24/2024    Procedure: ULTRASOUND, UPPER GI TRACT, ENDOSCOPIC;  Surgeon: Kevin Carballo MD;  Location: [facility] (2ND FLR);  Service: Endoscopy;  Laterality: N/A;    ENDOSCOPIC ULTRASOUND OF UPPER GASTROINTESTINAL TRACT N/A 03/05/2024    Procedure: ULTRASOUND, UPPER GI TRACT, ENDOSCOPIC;  Surgeon: Kevin Carballo MD;  Location: [facility] (2ND FLR);  Service: Endoscopy;  Laterality: N/A;  1/25 portal instr-Repeat theEUS at the next available appointment because the preparation was poor. 48hrs of liquid. Ozempic 7 day hold-eliquis approved Dr. Pelaez TE 12/12/2023-tt  2/28-precall complete-pt verbalized udnerstanding of holding Oz    ESOPHAGOGASTRODUODENOSCOPY N/A 02/05/2021    Procedure: EGD (ESOPHAGOGASTRODUODENOSCOPY);  Surgeon: Matthew Hernadez MD;  Location: [facility] (2ND FLR);  Service: Endoscopy;  Laterality: N/A;  BMI-58    Wt: 361#     COVID test at W on 2/2-GT    ESOPHAGOGASTRODUODENOSCOPY N/A 03/17/2021    Procedure: EGD (ESOPHAGOGASTRODUODENOSCOPY);  Surgeon: Gerald Anaya MD;  Location: [facility]  ENDO (2ND FLR);  Service: Endoscopy;  Laterality: N/A;    ESOPHAGOGASTRODUODENOSCOPY N/A 05/25/2021    Procedure: EGD (ESOPHAGOGASTRODUODENOSCOPY);  Surgeon: Kevin Carballo MD;  Location: [facility] ENDO (2ND FLR);  Service: Endoscopy;  Laterality: N/A;  ESD 2 hours  Full COVID vaccination on file. ttr    ESOPHAGOGASTRODUODENOSCOPY N/A 01/13/2022    Procedure: EGD (ESOPHAGOGASTRODUODENOSCOPY);  Surgeon: Kevin Carballo MD;  Location: [facility] ENDO (2ND FLR);  Service: Endoscopy;  Laterality: N/A;  blood thinner approval, see telephone encounter 1/7/22-BB  rapid  instructions given verbally and sent to address on file in pt's chart-BB    ESOPHAGOGASTRODUODENOSCOPY N/A 10/11/2022    Procedure: EGD (ESOPHAGOGASTRODUODENOSCOPY);  Surgeon: Dequan Ridley MD;  Location: [facility] (2ND FLR);  Service: Endoscopy;  Laterality: N/A;  She is do for EGD/EUS now. Personal history of gastric cancer. Ca Coats #[id number].   Thanks,   Kevin Fuentes MD    Pt is fully vaccinated-DS  9/14/22-Approval to hold Eliquis rec'd from Dr. Pelaez (see telephone encounter 9/14/22)-DS  9/14/22-Ins    ESOPHAGOGASTRODUODENOSCOPY N/A 01/24/2024    Procedure: EGD (ESOPHAGOGASTRODUODENOSCOPY);  Surgeon: Kevin Carballo MD;  Location: [facility] ENDO (2ND FLR);  Service: Endoscopy;  Laterality: N/A;  12/8/23: instructions sent via portal. eliquis approval from MD Pelaez in telephone encounter (12/12/23) -GD  1/9-precall complete-MS    ESOPHAGOGASTRODUODENOSCOPY N/A 03/05/2024    Procedure: EGD (ESOPHAGOGASTRODUODENOSCOPY);  Surgeon: Kevin Carballo MD;  Location: [facility] ENDO (2ND FLR);  Service: Endoscopy;  Laterality: N/A;    EYE SURGERY      HYSTEROSCOPIC POLYPECTOMY OF UTERUS  01/10/2024    Procedure: POLYPECTOMY, UTERUS, HYSTEROSCOPIC;  Surgeon: Pina Pickens MD;  Location[facility];  Service: OB/GYN;;    HYSTEROSCOPY WITH DILATION AND CURETTAGE OF UTERUS N/A 01/10/2024    Procedure: HYSTEROSCOPY, WITH DILATION AND CURETTAGE OF  UTERUS;  Surgeon: Pina Pickens MD;  Location: [facility] OR;  Service: OB/GYN;  Laterality: N/A;    INJECTION OF ANESTHETIC AGENT AROUND NERVE Left 07/10/2018    Procedure: BLOCK, NERVE;  Surgeon: Samantha Vidal MD;  Location: [facility] PAIN MGT;  Service: Pain Management;  Laterality: Left;  Genicular     INJECTION OF ANESTHETIC AGENT AROUND NERVE Left 07/19/2019    Procedure: Block, Nerve NERVE BLOCK GENICULAR WITH PHENOL 6%;  Surgeon: Samantha Vidal MD;  Location: [facility] PAIN MGT;  Service: Pain Management;  Laterality: Left;  NEEDS CONSENT    INSERTION OF TUNNELED CENTRAL VENOUS CATHETER (CVC) WITH SUBCUTANEOUS PORT N/A 07/09/2021    Procedure: INSERTION, PORT-A-CATH;  Surgeon: Mario Paz MD;  Location: [facility] CATH LAB;  Service: Radiology;  Laterality: N/A;  PORT PLACEMENT    RADIOFREQUENCY ABLATION Left 06/19/2020    Procedure: RADIOFREQUENCY ABLATION LEFT GENICULAR;  Surgeon: Tevin Clark MD;  Location: [facility] PAIN MGT;  Service: Pain Management;  Laterality: Left;  Left Genicular RFA    RADIOFREQUENCY ABLATION Left 01/22/2021    Procedure: RADIOFREQUENCY ABLATION LEFT GENICULAR;  Surgeon: Tevin Clark MD;  Location: [facility] PAIN MGT;  Service: Pain Management;  Laterality: Left;    RADIOFREQUENCY ABLATION Left 07/30/2021    Procedure: RADIOFREQUENCY ABLATION, GENICULAR COOLED NEED CONSENT;  Surgeon: Tevin Clark MD;  Location: [facility] PAIN MGT;  Service: Pain Management;  Laterality: Left;    RADIOFREQUENCY ABLATION Left 04/22/2022    Procedure: RADIOFREQUENCY ABLATION, GENICULAR COOLED , LEFT;  Surgeon: Tevin Clark MD;  Location: [facility] PAIN MGT;  Service: Pain Management;  Laterality: Left;    RADIOFREQUENCY ABLATION Left 12/23/2022    Procedure: RADIOFREQUENCY ABLATION LEFT GENICULAR COOLED CLEARED TO HOLD ELIQUIS 3 DAYS  NEEDS CONSENT;  Surgeon: Tevin Clark MD;  Location: [facility] PAIN MGT;  Service: Pain Management;  Laterality: Left;    TONSILLECTOMY           Vital Signs:  Temp:   "[36.7 °C (98.1 °F)]   Pulse:  [74]   Resp:  [18]   BP: (125-135)/(60-63)   SpO2:  [96 %]       CBC: No results for input(s): "WBC", "RBC", "HGB", "HCT", "PLT", "MCV", "MCH", "MCHC" in the last 72 hours.    CMP: No results for input(s): "NA", "K", "CL", "CO2", "BUN", "CREATININE", "GLU", "MG", "PHOS", "CALCIUM", "ALBUMIN", "PROT", "ALKPHOS", "ALT", "AST", "BILITOT" in the last 72 hours.    INR  No results for input(s): "PT", "INR", "PROTIME", "APTT" in the last 72 hours.          Pre-op Assessment    I have reviewed the Patient Summary Reports.     I have reviewed the Nursing Notes. I have reviewed the NPO Status.   I have reviewed the Medications.     Review of Systems  Anesthesia Hx:  No problems with previous Anesthesia             Denies Family Hx of Anesthesia complications.    Denies Personal Hx of Anesthesia complications.                    Hematology/Oncology:                      Current/Recent Cancer.                Cardiovascular:  Exercise tolerance: poor   Denies Pacemaker. Hypertension    Denies CABG/stent.    CHF    hyperlipidemia   ECG has been reviewed.                          Pulmonary:     Denies Asthma.    Sleep Apnea                Renal/:  Chronic Renal Disease                Hepatic/GI:  Hepatic/GI Normal                 Musculoskeletal:  Arthritis               Neurological:    Neuromuscular Disease,       aneurysms                            Endocrine:  Endocrine Normal          Morbid Obesity / BMI > 40  Psych:  Psychiatric History                Physical Exam  General: Alert and Oriented    Airway:  Mallampati: III   Mouth Opening: Normal  TM Distance: Normal  Tongue: Normal    Dental:  Edentulous    Chest/Lungs:  Clear to auscultation, Normal Respiratory Rate    Heart:  Rate: Normal  Rhythm: Regular Rhythm      Anesthesia Plan  Type of Anesthesia, risks & benefits discussed:    Anesthesia Type: Gen ETT  Intra-op Monitoring Plan: Standard ASA Monitors  Post Op Pain Control Plan: IV/PO " Opioids PRN and multimodal analgesia  Induction:  IV  Airway Plan: Video  Informed Consent: Informed consent signed with the Patient and all parties understand the risks and agree with anesthesia plan.  All questions answered.   ASA Score: 3  Day of Surgery Review of History & Physical: H&P Update referred to the surgeon/provider.    Ready For Surgery From Anesthesia Perspective.     .

## 2024-10-03 NOTE — PROGRESS NOTES
Reported given to Sarahi CARROLL. Patient lying in bed with eyes closed. Easily aroused by verbal stimuli. Resp even and unlabored. Pt remains on 2L  via NC. VSS. Generalized edema noted to BLE. Family aware of patient transfer. Safety Maintained.

## 2024-10-03 NOTE — NURSING TRANSFER
Nursing Transfer Note      10/3/2024   4:40 PM    Reason patient is being transferred: Recovery criteria met    PACU nurse giving handoff: GLEN Gomez    Nurse receiving handoff: GLEN Aparicio    Transfer to [facility]    Transfer via stretcher    Transported by RN    Transfer Vital Signs @ 1630  Temperature: 97.7  Blood Pressure: 150/75  Heart Rate: 84  O2 Sat: 92% on RA  Respirations:14    Medicines/Equipment sent with patient: None    Any special needs or follow-up needed: Discharge    Patient belongings transferred with patient: Yes cane and belongings bag    Chart send with patient: Yes    Notified: Daughter, Lillian (no answer); Caregiver/Ride home - [place] notified ([phone])    Patient reassessed prior to transfer at: 10/3/24 @ 1630

## 2024-10-03 NOTE — PROGRESS NOTES
Dr. Nogueira cleared patient for discharge home. Caregiver/Ride home ([place]) notified and is in route. Daughter Lillian called, but no answer or ability to leave voicemail.

## 2024-10-03 NOTE — H&P
Radiology History & Physical      SUBJECTIVE:     Chief Complaint: Left pulmonary nodule    History of Present Illness:  Ca Coats is a 73 y.o. female with hx of gastric adenocarcinoma who presents for left lung nodule biopsy.   Past Medical History:   Diagnosis Date    YOUNG (acute kidney injury) 10/15/2021    Anemia     2018    Arthritis     BMI 60.0-69.9, adult     Cataract     Chronic diastolic congestive heart failure 01/27/2021    Deep vein thrombosis     Depression     Dry eye syndrome     DVT of deep femoral vein, right 05/29/2023    Gastric adenocarcinoma 05/25/2021    GERD (gastroesophageal reflux disease)     Glaucoma suspect     History of gastric ulcer     History of psychiatric hospitalization     Hx of psychiatric care     Celexa, no recall of charted Effexor, Lexapro, Desyrel prescriptions    Hyperlipidemia     Hypertension     Hypothyroidism     Morbid obesity     Neuromuscular disorder     Sleep apnea     Thyroid disease      Past Surgical History:   Procedure Laterality Date    APPENDECTOMY      CATARACT EXTRACTION W/  INTRAOCULAR LENS IMPLANT Bilateral     MFIOL OU    CORONARY ANGIOGRAPHY Bilateral 02/24/2021    Procedure: ANGIOGRAM, CORONARY ARTERY;  Surgeon: Cresencio Ridley MD;  Location: [facility] CATH LAB;  Service: Cardiology;  Laterality: Bilateral;  Covid test needed - ok per Danay SSCU. Pt. w/o transportation or family    ENDOSCOPIC ULTRASOUND OF UPPER GASTROINTESTINAL TRACT N/A 03/17/2021    Procedure: ULTRASOUND, UPPER GI TRACT, ENDOSCOPIC;  Surgeon: Gerald Anaya MD;  Location: [facility] ([address] [place] [address]);  Service: Endoscopy;  Laterality: N/A;  Pt unable to get a ride for swab. Will do rapid test at 8:30 - ttr    ENDOSCOPIC ULTRASOUND OF UPPER GASTROINTESTINAL TRACT N/A 01/13/2022    Procedure: ULTRASOUND, UPPER GI TRACT, ENDOSCOPIC;  Surgeon: Kevin Carballo MD;  Location: [facility] ([address] [place] [address]);  Service: Endoscopy;  Laterality: N/A;  blood thinner approval received, see  telephone encounter 1/7/22-BB  rapid  instructions given verbally and sent to address on file in pt's chart-BB    ENDOSCOPIC ULTRASOUND OF UPPER GASTROINTESTINAL TRACT N/A 10/11/2022    Procedure: ULTRASOUND, UPPER GI TRACT, ENDOSCOPIC;  Surgeon: Dequan Ridley MD;  Location: [facility] (2ND FLR);  Service: Endoscopy;  Laterality: N/A;    ENDOSCOPIC ULTRASOUND OF UPPER GASTROINTESTINAL TRACT N/A 01/24/2024    Procedure: ULTRASOUND, UPPER GI TRACT, ENDOSCOPIC;  Surgeon: Kevin Carballo MD;  Location: [facility] (2ND FLR);  Service: Endoscopy;  Laterality: N/A;    ENDOSCOPIC ULTRASOUND OF UPPER GASTROINTESTINAL TRACT N/A 03/05/2024    Procedure: ULTRASOUND, UPPER GI TRACT, ENDOSCOPIC;  Surgeon: Kevin Carballo MD;  Location: [facility] (2ND FLR);  Service: Endoscopy;  Laterality: N/A;  1/25 portal instr-Repeat theEUS at the next available appointment because the preparation was poor. 48hrs of liquid. Ozempic 7 day hold-eliquis approved Dr. Pelaez TE 12/12/2023-tt  2/28-precall complete-pt verbalized udnerstanding of holding Oz    ESOPHAGOGASTRODUODENOSCOPY N/A 02/05/2021    Procedure: EGD (ESOPHAGOGASTRODUODENOSCOPY);  Surgeon: Matthew Hernadez MD;  Location: [facility] (2ND FLR);  Service: Endoscopy;  Laterality: N/A;  BMI-58    Wt: 361#     COVID test at [facility] on 2/2-GT    ESOPHAGOGASTRODUODENOSCOPY N/A 03/17/2021    Procedure: EGD (ESOPHAGOGASTRODUODENOSCOPY);  Surgeon: Gerald Anaya MD;  Location: [facility] (2ND FLR);  Service: Endoscopy;  Laterality: N/A;    ESOPHAGOGASTRODUODENOSCOPY N/A 05/25/2021    Procedure: EGD (ESOPHAGOGASTRODUODENOSCOPY);  Surgeon: Kevin Carballo MD;  Location: [facility] (2ND FLR);  Service: Endoscopy;  Laterality: N/A;  ESD 2 hours  Full COVID vaccination on file. ttr    ESOPHAGOGASTRODUODENOSCOPY N/A 01/13/2022    Procedure: EGD (ESOPHAGOGASTRODUODENOSCOPY);  Surgeon: Kevin Carballo MD;  Location: [facility] ([address] [place] [address]);  Service: Endoscopy;  Laterality: N/A;  blood thinner  approval, see telephone encounter 1/7/22-BB  rapid  instructions given verbally and sent to address on file in pt's chart-BB    ESOPHAGOGASTRODUODENOSCOPY N/A 10/11/2022    Procedure: EGD (ESOPHAGOGASTRODUODENOSCOPY);  Surgeon: Dequan Ridley MD;  Location: [facility] ENDO (2ND FLR);  Service: Endoscopy;  Laterality: N/A;  She is do for EGD/EUS now. Personal history of gastric cancer. Ca Coats #[id number].   Thanks,   Kevin Fuentes MD    Pt is fully vaccinated-DS  9/14/22-Approval to hold Eliquis rec'd from Dr. Pelaez (see telephone encounter 9/14/22)-DS  9/14/22-Ins    ESOPHAGOGASTRODUODENOSCOPY N/A 01/24/2024    Procedure: EGD (ESOPHAGOGASTRODUODENOSCOPY);  Surgeon: Kevin Carballo MD;  Location: [facility] (2ND FLR);  Service: Endoscopy;  Laterality: N/A;  12/8/23: instructions sent via portal. eliquis approval from MD Pelaez in telephone encounter (12/12/23) -GD  1/9-precall complete-MS    ESOPHAGOGASTRODUODENOSCOPY N/A 03/05/2024    Procedure: EGD (ESOPHAGOGASTRODUODENOSCOPY);  Surgeon: Kevin Carballo MD;  Location: [facility] (2ND FLR);  Service: Endoscopy;  Laterality: N/A;    EYE SURGERY      HYSTEROSCOPIC POLYPECTOMY OF UTERUS  01/10/2024    Procedure: POLYPECTOMY, UTERUS, HYSTEROSCOPIC;  Surgeon: Pina Pickens MD;  Location: [facility] OR;  Service: OB/GYN;;    HYSTEROSCOPY WITH DILATION AND CURETTAGE OF UTERUS N/A 01/10/2024    Procedure: HYSTEROSCOPY, WITH DILATION AND CURETTAGE OF UTERUS;  Surgeon: Pina Pickens MD;  Location: [facility] OR;  Service: OB/GYN;  Laterality: N/A;    INJECTION OF ANESTHETIC AGENT AROUND NERVE Left 07/10/2018    Procedure: BLOCK, NERVE;  Surgeon: Samantha Vidal MD;  Location: [facility] PAIN MGT;  Service: Pain Management;  Laterality: Left;  Genicular     INJECTION OF ANESTHETIC AGENT AROUND NERVE Left 07/19/2019    Procedure: Block, Nerve NERVE BLOCK GENICULAR WITH PHENOL 6%;  Surgeon: Samantha Vidal MD;  Location: [facility];  Service: Pain Management;   Laterality: Left;  NEEDS CONSENT    INSERTION OF TUNNELED CENTRAL VENOUS CATHETER (CVC) WITH SUBCUTANEOUS PORT N/A 07/09/2021    Procedure: INSERTION, PORT-A-CATH;  Surgeon: Mario Paz MD;  Location: [facility] CATH LAB;  Service: Radiology;  Laterality: N/A;  PORT PLACEMENT    RADIOFREQUENCY ABLATION Left 06/19/2020    Procedure: RADIOFREQUENCY ABLATION LEFT GENICULAR;  Surgeon: Tevin Clark MD;  Location: [facility] PAIN MGT;  Service: Pain Management;  Laterality: Left;  Left Genicular RFA    RADIOFREQUENCY ABLATION Left 01/22/2021    Procedure: RADIOFREQUENCY ABLATION LEFT GENICULAR;  Surgeon: Tevin Clark MD;  Location: [facility] PAIN MGT;  Service: Pain Management;  Laterality: Left;    RADIOFREQUENCY ABLATION Left 07/30/2021    Procedure: RADIOFREQUENCY ABLATION, GENICULAR COOLED NEED CONSENT;  Surgeon: Tevin Clark MD;  Location: [facility] PAIN MGT;  Service: Pain Management;  Laterality: Left;    RADIOFREQUENCY ABLATION Left 04/22/2022    Procedure: RADIOFREQUENCY ABLATION, GENICULAR COOLED , LEFT;  Surgeon: Tevin Clark MD;  Location: [facility] PAIN MGT;  Service: Pain Management;  Laterality: Left;    RADIOFREQUENCY ABLATION Left 12/23/2022    Procedure: RADIOFREQUENCY ABLATION LEFT GENICULAR COOLED CLEARED TO HOLD ELIQUIS 3 DAYS  NEEDS CONSENT;  Surgeon: Tevin Clark MD;  Location: [facility] PAIN MGT;  Service: Pain Management;  Laterality: Left;    TONSILLECTOMY         Home Meds:   Prior to Admission medications    Medication Sig Start Date End Date Taking? Authorizing Provider   amLODIPine (NORVASC) 10 MG tablet Take 1 tablet (10 mg total) by mouth once daily.  Patient taking differently: Take 10 mg by mouth every morning. 6/18/24  Yes Lei Garcia MD   atorvastatin (LIPITOR) 20 MG tablet Take 1 tablet (20 mg total) by mouth once daily. 6/18/24  Yes Lei Garcia MD   B-complex with vitamin C (Z-BEC OR EQUIV) tablet Take 1 tablet by mouth once daily.  2/28/18  Yes Provider, Historical   CALCIUM  CITRATE-VITAMIN D2 ORAL Take 1 tablet by mouth once daily.  10/16/18  Yes Provider, Historical   citalopram (CELEXA) 10 MG tablet Take 1 tablet (10 mg total) by mouth once daily. 6/18/24  Yes Lei Garcia MD   furosemide (LASIX) 80 MG tablet Take 1 tablet (80 mg total) by mouth 2 (two) times daily. 6/18/24  Yes Lei Garcia MD   gabapentin (NEURONTIN) 300 MG capsule Take 2 capsules (600 mg total) by mouth 3 (three) times daily. 9/23/24  Yes Savanna Hyatt NP   lisinopriL (PRINIVIL,ZESTRIL) 40 MG tablet Take 1 tablet (40 mg total) by mouth once daily.  Patient taking differently: Take 40 mg by mouth every evening. 6/18/24  Yes Lei Garcia MD   multivitamin with minerals tablet Take 1 tablet by mouth once daily.  2/28/18  Yes Provider, Historical   oxyCODONE-acetaminophen (PERCOCET) 5-325 mg per tablet Take 1 tablet by mouth every 8 (eight) hours as needed for Pain. 10/2/24 11/1/24 Yes Tevin Clark MD   pantoprazole (PROTONIX) 40 MG tablet Take 1 tablet (40 mg total) by mouth once daily. 6/18/24  Yes Lei Garcia MD   acetaminophen (TYLENOL) 500 MG tablet Take by mouth daily as needed. 6/17/19   Provider, Historical   aspirin (ECOTRIN) 325 MG EC tablet Take 1 tablet (325 mg total) by mouth once daily. 8/6/24 8/6/25  Natasha Rivera PA-C   clopidogreL (PLAVIX) 75 mg tablet Take 1 tablet (75 mg total) by mouth once daily.  Patient not taking: Reported on 9/10/2024 8/6/24 8/6/25  Natasha Rivera PA-C   ferrous gluconate (FERGON) 324 MG tablet TAKE 1 TABLET (324 MG TOTAL) BY MOUTH DAILY WITH BREAKFAST. 11/29/23   Lei Garcia MD   semaglutide (OZEMPIC) 1 mg/dose (4 mg/3 mL) Inject 1 mg into the skin every 7 days.  Patient taking differently: Inject 1 mg into the skin every 7 days. SATURDAYS 6/18/24   Lei Garcia MD     Anticoagulants/Antiplatelets:  reviewed    Allergies: Review of patient's allergies indicates:  No Known Allergies  Sedation History:  no adverse reactions    Review of Systems:    Hematological: no known coagulopathies  Respiratory: no shortness of breath  Cardiovascular: no chest pain  Gastrointestinal: no abdominal pain  Genito-Urinary: no dysuria  Musculoskeletal: negative  Neurological: no TIA or stroke symptoms         OBJECTIVE:     Vital Signs (Most Recent)  Temp: 98.1 °F (36.7 °C) (10/03/24 0757)  Pulse: 74 (10/03/24 0757)  Resp: 18 (10/03/24 0757)  BP: 135/63 (10/03/24 0758)  SpO2: 96 % (10/03/24 0757)    Physical Exam:  ASA: per anes    Mallampati: per anes      General: no acute distress  Mental Status: alert and oriented to person, place and time  HEENT: normocephalic, atraumatic  Chest: unlabored breathing  Heart: regular heart rate  Abdomen: nondistended    Laboratory  Lab Results   Component Value Date    INR 0.9 09/10/2024       Lab Results   Component Value Date    WBC 10.30 09/10/2024    HGB 11.2 (L) 09/10/2024    HCT 36.2 (L) 09/10/2024     (H) 09/10/2024     09/10/2024      Lab Results   Component Value Date    GLU 95 05/31/2024     05/31/2024    K 4.1 05/31/2024     05/31/2024    CO2 26 05/31/2024    BUN 17 05/31/2024    CREATININE 0.9 05/31/2024    CALCIUM 9.5 05/31/2024    MG 2.0 10/04/2022    ALT 11 05/27/2024    AST 16 05/27/2024    ALBUMIN 3.1 (L) 05/27/2024    BILITOT 0.4 05/27/2024    BILIDIR 0.2 05/13/2019       ASSESSMENT/PLAN:     Sedation Plan: per anes  Patient will undergo left lung nodule biopsy.    Yesi Flood MD PGY-2  Department of Radiology  [facility]

## 2024-10-03 NOTE — PROGRESS NOTES
Spoke with  radiology resident regarding patient decreased o2 stats. Per Dr. Pierre he will contact IR team and will call back to determine if she can be discharged home.

## 2024-10-03 NOTE — PROGRESS NOTES
Awaiting MD read of last Chest XR to determine if patient can be discharged home. Spoke to IR resident Remigio, waiting for MD Mirlande to finish procedure to review XR. Daughter Lillian updated by phone.

## 2024-10-03 NOTE — ANESTHESIA PROCEDURE NOTES
Intubation    Date/Time: 10/3/2024 10:24 AM    Performed by: Lombard, Jeffrey C., CRNA  Authorized by: Joceline Woods MD    Intubation:     Induction:  Inhalational - mask    Intubated:  Postinduction    Mask Ventilation:  Easy with oral airway    Attempts:  1    Attempted By:  VICENTE    Blade:  Hernandez 3    Laryngeal View Grade: Grade I - full view of cords      Difficult Airway Encountered?: No      Complications:  None    Airway Device:  Direct    Airway Device Size:  7.0    Style/Cuff Inflation:  Cuffed (inflated to minimal occlusive pressure)    Tube secured:  20    Secured at:  The lips    Placement Verified By:  Auscultation    Complicating Factors:  None    Findings Post-Intubation:  Bilateral breath sounds, positive ETCO2 and atraumatic / condition of teeth unchanged

## 2024-10-03 NOTE — PROGRESS NOTES
Notifed  regarding patient o2 stats decreasing to 87% on Room air. Per Dr. Gardner contact IR team and she will come look at patient.

## 2024-10-04 VITALS
TEMPERATURE: 98 F | WEIGHT: 264 LBS | BODY MASS INDEX: 42.43 KG/M2 | HEART RATE: 90 BPM | RESPIRATION RATE: 18 BRPM | OXYGEN SATURATION: 99 % | HEIGHT: 66 IN | SYSTOLIC BLOOD PRESSURE: 126 MMHG | DIASTOLIC BLOOD PRESSURE: 77 MMHG

## 2024-10-04 LAB
ANION GAP SERPL CALC-SCNC: 6 MMOL/L (ref 8–16)
BNP SERPL-MCNC: 141 PG/ML (ref 0–99)
BUN SERPL-MCNC: 15 MG/DL (ref 8–23)
CALCIUM SERPL-MCNC: 9.2 MG/DL (ref 8.7–10.5)
CHLORIDE SERPL-SCNC: 107 MMOL/L (ref 95–110)
CO2 SERPL-SCNC: 28 MMOL/L (ref 23–29)
CREAT SERPL-MCNC: 0.8 MG/DL (ref 0.5–1.4)
EST. GFR  (NO RACE VARIABLE): >60 ML/MIN/1.73 M^2
GLUCOSE SERPL-MCNC: 94 MG/DL (ref 70–110)
MAGNESIUM SERPL-MCNC: 2.1 MG/DL (ref 1.6–2.6)
MAGNESIUM SERPL-MCNC: 2.2 MG/DL (ref 1.6–2.6)
POTASSIUM SERPL-SCNC: 4.2 MMOL/L (ref 3.5–5.1)
SODIUM SERPL-SCNC: 141 MMOL/L (ref 136–145)

## 2024-10-04 PROCEDURE — 80048 BASIC METABOLIC PNL TOTAL CA: CPT | Performed by: STUDENT IN AN ORGANIZED HEALTH CARE EDUCATION/TRAINING PROGRAM

## 2024-10-04 PROCEDURE — 99900035 HC TECH TIME PER 15 MIN (STAT)

## 2024-10-04 PROCEDURE — 83735 ASSAY OF MAGNESIUM: CPT | Performed by: STUDENT IN AN ORGANIZED HEALTH CARE EDUCATION/TRAINING PROGRAM

## 2024-10-04 PROCEDURE — 27100171 HC OXYGEN HIGH FLOW UP TO 24 HOURS

## 2024-10-04 PROCEDURE — 83735 ASSAY OF MAGNESIUM: CPT | Mod: 91 | Performed by: STUDENT IN AN ORGANIZED HEALTH CARE EDUCATION/TRAINING PROGRAM

## 2024-10-04 PROCEDURE — 94761 N-INVAS EAR/PLS OXIMETRY MLT: CPT

## 2024-10-04 PROCEDURE — 25000003 PHARM REV CODE 250: Performed by: STUDENT IN AN ORGANIZED HEALTH CARE EDUCATION/TRAINING PROGRAM

## 2024-10-04 PROCEDURE — 36415 COLL VENOUS BLD VENIPUNCTURE: CPT | Performed by: STUDENT IN AN ORGANIZED HEALTH CARE EDUCATION/TRAINING PROGRAM

## 2024-10-04 PROCEDURE — 83880 ASSAY OF NATRIURETIC PEPTIDE: CPT | Performed by: STUDENT IN AN ORGANIZED HEALTH CARE EDUCATION/TRAINING PROGRAM

## 2024-10-04 PROCEDURE — G0378 HOSPITAL OBSERVATION PER HR: HCPCS

## 2024-10-04 PROCEDURE — 94660 CPAP INITIATION&MGMT: CPT

## 2024-10-04 RX ADMIN — OXYCODONE HYDROCHLORIDE AND ACETAMINOPHEN 1 TABLET: 5; 325 TABLET ORAL at 09:10

## 2024-10-04 RX ADMIN — GABAPENTIN 600 MG: 300 CAPSULE ORAL at 09:10

## 2024-10-04 RX ADMIN — ATORVASTATIN CALCIUM 20 MG: 20 TABLET, FILM COATED ORAL at 09:10

## 2024-10-04 RX ADMIN — Medication 324 MG: at 09:10

## 2024-10-04 RX ADMIN — ASPIRIN 325 MG: 325 TABLET, COATED ORAL at 09:10

## 2024-10-04 RX ADMIN — AMLODIPINE BESYLATE 10 MG: 10 TABLET ORAL at 09:10

## 2024-10-04 RX ADMIN — CITALOPRAM HYDROBROMIDE 10 MG: 10 TABLET ORAL at 09:10

## 2024-10-04 NOTE — CARE UPDATE
Post lung biopsy update:    Patient had left lung biopsy with IR with small expected local hemorrhage on post biopsy scan, finding remained stable on 3 hourly CXRs. While at recovery patient was sating in late 80s & low 90s requiring O2 via nasal cannula. At 3 hours post biopsy patient was sating at 95% on room air and was ready for discharge but O2 dropped again. Due to O2 sat fluctuation, we decided to contact hospital medicine for admission.     Ravi Nogueira MD  VIR

## 2024-10-04 NOTE — H&P
[facility] Medicine  History & Physical    Patient Name: Ca Coats  MRN: [id number]  Patient Class: OP- Observation  Admission Date: 10/3/2024  Attending Physician: Lester Gupta MD   Primary Care Provider: Lei Garcia MD         Patient information was obtained from patient and past medical records.     Subjective:     Principal Problem:Hypoxia    Chief Complaint: No chief complaint on file.       HPI: Ca Coats is a 73 y.o. female with history of gastric adenocarcinoma, significant smoking history with likely COPD, multiple pulmonary nodules, HTN, HLD, morbid obesity, LAURA, chronic pain on opioids, history of DVT, intracranial aneurysms who presents with hypoxia after IR lung nodule biopsy today.     She underwent routine scheduled IR lung nodule biopsy today, reportedly with small local hemorrhage. However, she reportedly became hypoxic to the mid-80s after extubation in the PACU. She denies any new SOB, cough, or other significant symptoms at this time.     She does admit to chronic exertional dyspnea, which is at baseline. Also endorses chronic LE edema, L>R, which improves with elevation. On further history, she is no longer taking Eliquis as she completed her 6 months of therapy around 2 months ago. She is not on home oxygen and reports that she wears her CPAP at night. Still smokes 3-4 cigarettes per day.    Past Medical History:   Diagnosis Date    YOUNG (acute kidney injury) 10/15/2021    Anemia     2018    Arthritis     BMI 60.0-69.9, adult     Cataract     Chronic diastolic congestive heart failure 01/27/2021    Deep vein thrombosis     Depression     Dry eye syndrome     DVT of deep femoral vein, right 05/29/2023    Gastric adenocarcinoma 05/25/2021    GERD (gastroesophageal reflux disease)     Glaucoma suspect     History of gastric ulcer     History of psychiatric hospitalization     Hx of psychiatric care     Celexa, no recall of charted Effexor,  Lexapro, Desyrel prescriptions    Hyperlipidemia     Hypertension     Hypothyroidism     Morbid obesity     Neuromuscular disorder     Sleep apnea     Thyroid disease        Past Surgical History:   Procedure Laterality Date    APPENDECTOMY      CATARACT EXTRACTION W/  INTRAOCULAR LENS IMPLANT Bilateral     MFIOL OU    CORONARY ANGIOGRAPHY Bilateral 02/24/2021    Procedure: ANGIOGRAM, CORONARY ARTERY;  Surgeon: Cresencio Ridley MD;  Location: [facility] CATH LAB;  Service: Cardiology;  Laterality: Bilateral;  Covid test needed - ok per Danay SSCU. Pt. w/o transportation or family    ENDOSCOPIC ULTRASOUND OF UPPER GASTROINTESTINAL TRACT N/A 03/17/2021    Procedure: ULTRASOUND, UPPER GI TRACT, ENDOSCOPIC;  Surgeon: Gerald Anaya MD;  Location: [facility] ([address] [place] [address]);  Service: Endoscopy;  Laterality: N/A;  Pt unable to get a ride for swab. Will do rapid test at 8:30 - ttr    ENDOSCOPIC ULTRASOUND OF UPPER GASTROINTESTINAL TRACT N/A 01/13/2022    Procedure: ULTRASOUND, UPPER GI TRACT, ENDOSCOPIC;  Surgeon: Kevin Carballo MD;  Location: [address]);  Service: Endoscopy;  Laterality: N/A;  blood thinner approval received, see telephone encounter 1/7/22-BB  rapid  instructions given verbally and sent to address on file in pt's chart-BB    ENDOSCOPIC ULTRASOUND OF UPPER GASTROINTESTINAL TRACT N/A 10/11/2022    Procedure: ULTRASOUND, UPPER GI TRACT, ENDOSCOPIC;  Surgeon: Dequan Ridley MD;  Location: [address]);  Service: Endoscopy;  Laterality: N/A;    ENDOSCOPIC ULTRASOUND OF UPPER GASTROINTESTINAL TRACT N/A 01/24/2024    Procedure: ULTRASOUND, UPPER GI TRACT, ENDOSCOPIC;  Surgeon: Kevin Carballo MD;  Location: [address]);  Service: Endoscopy;  Laterality: N/A;    ENDOSCOPIC ULTRASOUND OF UPPER GASTROINTESTINAL TRACT N/A 03/05/2024    Procedure: ULTRASOUND, UPPER GI TRACT, ENDOSCOPIC;  Surgeon: Kevin Carballo MD;  Location: [address]);  Service: Endoscopy;  Laterality: N/A;   1/25 portal instr-Repeat theEUS at the next available appointment because the preparation was poor. 48hrs of liquid. Ozempic 7 day hold-eliquis approved Dr. Pelaez TE 12/12/2023-tt  2/28-precall complete-pt verbalized udnerstanding of holding Oz    ESOPHAGOGASTRODUODENOSCOPY N/A 02/05/2021    Procedure: EGD (ESOPHAGOGASTRODUODENOSCOPY);  Surgeon: Matthew Hernadez MD;  Location: [facility] ENDO (2ND FLR);  Service: Endoscopy;  Laterality: N/A;  BMI-58    Wt: 361#     COVID test at [facility] on 2/2-GT    ESOPHAGOGASTRODUODENOSCOPY N/A 03/17/2021    Procedure: EGD (ESOPHAGOGASTRODUODENOSCOPY);  Surgeon: Gerald Anaya MD;  Location: [facility] ENDO (2ND FLR);  Service: Endoscopy;  Laterality: N/A;    ESOPHAGOGASTRODUODENOSCOPY N/A 05/25/2021    Procedure: EGD (ESOPHAGOGASTRODUODENOSCOPY);  Surgeon: Kevin Carballo MD;  Location: [facility] ENDO (2ND FLR);  Service: Endoscopy;  Laterality: N/A;  ESD 2 hours  Full COVID vaccination on file. ttr    ESOPHAGOGASTRODUODENOSCOPY N/A 01/13/2022    Procedure: EGD (ESOPHAGOGASTRODUODENOSCOPY);  Surgeon: Kevin Carballo MD;  Location: [facility] ENDO (2ND FLR);  Service: Endoscopy;  Laterality: N/A;  blood thinner approval, see telephone encounter 1/7/22-BB  rapid  instructions given verbally and sent to address on file in pt's chart-BB    ESOPHAGOGASTRODUODENOSCOPY N/A 10/11/2022    Procedure: EGD (ESOPHAGOGASTRODUODENOSCOPY);  Surgeon: Dequan Ridley MD;  Location: [facility] ENDO (2ND FLR);  Service: Endoscopy;  Laterality: N/A;  She is do for EGD/EUS now. Personal history of gastric cancer. Ca Baremmett #[id number].   Thanks,   Kevin Fuentes MD    Pt is fully vaccinated-DS  9/14/22-Approval to hold Eliquis rec'd from Dr. Pelaez (see telephone encounter 9/14/22)-DS  9/14/22-Ins    ESOPHAGOGASTRODUODENOSCOPY N/A 01/24/2024    Procedure: EGD (ESOPHAGOGASTRODUODENOSCOPY);  Surgeon: Kevin Carballo MD;  Location: [facility] ([address] [place] [address]);  Service: Endoscopy;  Laterality: N/A;  12/8/23: instructions sent via  portal. eliquis approval from MD Pelaez in telephone encounter (12/12/23) -GD  1/9-precall complete-MS    ESOPHAGOGASTRODUODENOSCOPY N/A 03/05/2024    Procedure: EGD (ESOPHAGOGASTRODUODENOSCOPY);  Surgeon: Kevin Carballo MD;  Location: [address]);  Service: Endoscopy;  Laterality: N/A;    EYE SURGERY      HYSTEROSCOPIC POLYPECTOMY OF UTERUS  01/10/2024    Procedure: POLYPECTOMY, UTERUS, HYSTEROSCOPIC;  Surgeon: Pina Pickens MD;  Location: [facility];  Service: OB/GYN;;    HYSTEROSCOPY WITH DILATION AND CURETTAGE OF UTERUS N/A 01/10/2024    Procedure: HYSTEROSCOPY, WITH DILATION AND CURETTAGE OF UTERUS;  Surgeon: Pina Pickens MD;  Location: [facility];  Service: OB/GYN;  Laterality: N/A;    INJECTION OF ANESTHETIC AGENT AROUND NERVE Left 07/10/2018    Procedure: BLOCK, NERVE;  Surgeon: Samantha Vidal MD;  Location: [facility] [place] PAIN MGT;  Service: Pain Management;  Laterality: Left;  Genicular     INJECTION OF ANESTHETIC AGENT AROUND NERVE Left 07/19/2019    Procedure: Block, Nerve NERVE BLOCK GENICULAR WITH PHENOL 6%;  Surgeon: Samantha Vidal MD;  Location: [facility] [place] PAIN MGT;  Service: Pain Management;  Laterality: Left;  NEEDS CONSENT    INSERTION OF TUNNELED CENTRAL VENOUS CATHETER (CVC) WITH SUBCUTANEOUS PORT N/A 07/09/2021    Procedure: INSERTION, PORT-A-CATH;  Surgeon: Mario Paz MD;  Location: [facility] [place] CATH LAB;  Service: Radiology;  Laterality: N/A;  PORT PLACEMENT    RADIOFREQUENCY ABLATION Left 06/19/2020    Procedure: RADIOFREQUENCY ABLATION LEFT GENICULAR;  Surgeon: Tevin Clark MD;  Location: [facility] [place] PAIN MGT;  Service: Pain Management;  Laterality: Left;  Left Genicular RFA    RADIOFREQUENCY ABLATION Left 01/22/2021    Procedure: RADIOFREQUENCY ABLATION LEFT GENICULAR;  Surgeon: Tevin Clark MD;  Location: [facility] [place] PAIN MGT;  Service: Pain Management;  Laterality: Left;    RADIOFREQUENCY ABLATION Left 07/30/2021    Procedure: RADIOFREQUENCY ABLATION, GENICULAR COOLED NEED CONSENT;   Surgeon: Tevin Clark MD;  Location: [facility] PAIN MGT;  Service: Pain Management;  Laterality: Left;    RADIOFREQUENCY ABLATION Left 04/22/2022    Procedure: RADIOFREQUENCY ABLATION, GENICULAR COOLED , LEFT;  Surgeon: Tevin Clark MD;  Location: [facility] PAIN MGT;  Service: Pain Management;  Laterality: Left;    RADIOFREQUENCY ABLATION Left 12/23/2022    Procedure: RADIOFREQUENCY ABLATION LEFT GENICULAR COOLED CLEARED TO HOLD ELIQUIS 3 DAYS  NEEDS CONSENT;  Surgeon: Tevin Clark MD;  Location: [facility] PAIN MGT;  Service: Pain Management;  Laterality: Left;    TONSILLECTOMY         Review of patient's allergies indicates:  No Known Allergies    Current Facility-Administered Medications on File Prior to Encounter   Medication    0.9%  NaCl infusion    0.9%  NaCl infusion    sodium chloride 0.9% flush 10 mL     Current Outpatient Medications on File Prior to Encounter   Medication Sig    amLODIPine (NORVASC) 10 MG tablet Take 1 tablet (10 mg total) by mouth once daily. (Patient taking differently: Take 10 mg by mouth every morning.)    atorvastatin (LIPITOR) 20 MG tablet Take 1 tablet (20 mg total) by mouth once daily.    B-complex with vitamin C (Z-BEC OR EQUIV) tablet Take 1 tablet by mouth once daily.     CALCIUM CITRATE-VITAMIN D2 ORAL Take 1 tablet by mouth once daily.     citalopram (CELEXA) 10 MG tablet Take 1 tablet (10 mg total) by mouth once daily.    furosemide (LASIX) 80 MG tablet Take 1 tablet (80 mg total) by mouth 2 (two) times daily.    gabapentin (NEURONTIN) 300 MG capsule Take 2 capsules (600 mg total) by mouth 3 (three) times daily.    lisinopriL (PRINIVIL,ZESTRIL) 40 MG tablet Take 1 tablet (40 mg total) by mouth once daily. (Patient taking differently: Take 40 mg by mouth every evening.)    multivitamin with minerals tablet Take 1 tablet by mouth once daily.     oxyCODONE-acetaminophen (PERCOCET) 5-325 mg per tablet Take 1 tablet by mouth every 8 (eight) hours as needed for Pain.    pantoprazole  (PROTONIX) 40 MG tablet Take 1 tablet (40 mg total) by mouth once daily.    acetaminophen (TYLENOL) 500 MG tablet Take by mouth daily as needed.    aspirin (ECOTRIN) 325 MG EC tablet Take 1 tablet (325 mg total) by mouth once daily.    clopidogreL (PLAVIX) 75 mg tablet Take 1 tablet (75 mg total) by mouth once daily. (Patient not taking: Reported on 9/10/2024)    ferrous gluconate (FERGON) 324 MG tablet TAKE 1 TABLET (324 MG TOTAL) BY MOUTH DAILY WITH BREAKFAST.    semaglutide (OZEMPIC) 1 mg/dose (4 mg/3 mL) Inject 1 mg into the skin every 7 days. (Patient taking differently: Inject 1 mg into the skin every 7 days. )     Family History       Problem Relation (Age of Onset)    No Known Problems Mother, Father          Tobacco Use    Smoking status: Some Days     Current packs/day: 0.00     Average packs/day: 1 pack/day for 53.9 years (53.7 ttl pk-yrs)     Types: Cigarettes     Start date:      Last attempt to quit: 2023     Years since quittin.8    Smokeless tobacco: Never    Tobacco comments:     currently smoking <5 cigarettes most days   Substance and Sexual Activity    Alcohol use: Yes     Alcohol/week: 1.0 standard drink of alcohol     Types: 1 Glasses of wine per week     Comment: rarely    Drug use: No    Sexual activity: Not Currently     Partners: Male     Review of Systems   All other systems reviewed and are negative.    Objective:     Vital Signs (Most Recent):  Temp: 98.6 °F (37 °C) (10/03/24 2030)  Pulse: 81 (10/03/24 2030)  Resp: 16 (10/03/24 2030)  BP: (!) 125/59 (10/03/24 2030)  SpO2: 97 % (10/03/24 2030) Vital Signs (24h Range):  Temp:  [97.5 °F (36.4 °C)-98.6 °F (37 °C)] 98.6 °F (37 °C)  Pulse:  [71-91] 81  Resp:  [12-25] 16  SpO2:  [87 %-100 %] 97 %  BP: (125-178)/(55-80) 125/59     Weight: 119.7 kg (264 lb)  Body mass index is 42.61 kg/m².     Physical Exam  Vitals and nursing note reviewed.   Constitutional:       General: She is not in acute distress.     Appearance: She  is well-developed. She is obese. She is not ill-appearing or diaphoretic.   HENT:      Head: Normocephalic and atraumatic.   Eyes:      General: No scleral icterus.     Conjunctiva/sclera: Conjunctivae normal.   Neck:      Vascular: No JVD.   Cardiovascular:      Rate and Rhythm: Normal rate and regular rhythm.   Pulmonary:      Effort: Pulmonary effort is normal. No respiratory distress.      Breath sounds: Rales present. No wheezing.      Comments: Rales in BL bases, speaks in full sentences without tachypnea or respiratory distress   Musculoskeletal:      Right lower leg: Edema present.      Left lower leg: Edema present.      Comments: Mild bilateral LE pitting edema, L circumference >R   Skin:     Coloration: Skin is not jaundiced or pale.   Neurological:      Mental Status: She is alert and oriented to person, place, and time.      Motor: No abnormal muscle tone.   Psychiatric:         Mood and Affect: Mood normal.         Behavior: Behavior normal.                Significant Labs: All pertinent labs within the past 24 hours have been reviewed.    Ordered CBC, BMP, BNP, ABG    Significant Imaging: I have reviewed all pertinent imaging results/findings within the past 24 hours.  Assessment/Plan:     * Hypoxia  Presents with hypoxia following intubation/IR pulmonary nodule biopsy today. Sats reportedly mid-80s on room air in the PACU, resolved with low-flow NC. CXR shows mild pulmonary edema without focal consolidation or PTX. Likely has underlying COPD as interstitial disease noted on CTs from at least 2021. Will obtain ABG for better respiratory assessment. She has no new complaints and appears well without wheezing.   -May be post-extubation hypoxia with possible mild volume overload and morbid obesity; will trial a dose of IV lasix. Monitor UOP.   -Given history of DVT and no longer on anticoagulation, will obtain DVT study, and if positive, would consider PE and need to resume anticoagulation.   -Wean  oxygen to maintain sats 88% or greater, and will encourage nightly CPAP use.     Lung disease  Prior CTs with evidence of chronic interstitial lung disease at least since 2021; highly suspect COPD given significant smoking history. Currently without evidence of acute exacerbation. Management as above.       Pulmonary nodules/lesions, multiple  S/p IR biopsy 10/3. Follow-up with outpatient Pulmonology.       History of DVT (deep vein thrombosis)  Patient with history of RLE DVT, and reports that she completed 6 months of Eliquis. Given hypoxia and LLE>RLE, will obtain LE dopplers to assess for recurrent DVT as she may need to resume anticoagulation.       Chronic pain with opioid dependence  Will continue home medications, as verified by PDMP.      Iron deficiency anemia secondary to blood loss (chronic)  Will check CBC given hypoxia. Continue home iron.     Tobacco abuse  Reports current smoking, 3-4 cigarettes per day. Would benefit from cessation. Will order Nicotine patch while admitted if desired.       Pure hypercholesterolemia  Continue hospital formulary of home statin.       Chronic diastolic congestive heart failure  Patient has Diastolic (HFpEF) heart failure that is Chronic, however some hypervolemia may be contributing to hypoxia as above. BNP ordered. Trial of IV diuresis as above.   Current Heart Failure Medications  lisinopriL tablet 40 mg, Nightly, Oral  furosemide injection 40 mg, Once, Intravenous    Plan  - Monitor strict I&Os and daily weights.    - Place on telemetry  - Low sodium diet  - Cardiology has not been consulted    Morbid obesity with BMI of 45.0-49.9, adult  Body mass index is 42.61 kg/m².; chronic comorbidity affecting medical decision-making. Patient would greatly benefit from weight loss through proper diet and increased physical activity.    Essential hypertension  Chronic, and currently controlled. Latest blood pressure and vitals reviewed-   While in the hospital, will manage  blood pressure as follows; Continue home antihypertensive regimen    Will utilize p.r.n. blood pressure medication only if patient's blood pressure greater than 220/110 and she develops symptoms such as worsening chest pain or shortness of breath.    Obstructive sleep apnea  Continue nightly CPAP; she is unsure of home settings.         VTE Risk Mitigation (From admission, onward)           Ordered     Reason for No Pharmacological VTE Prophylaxis  Once        Question:  Reasons:  Answer:  Risk of Bleeding    10/03/24 1949     IP VTE HIGH RISK PATIENT  Once         10/03/24 1949     Place sequential compression device  Until discontinued         10/03/24 1949                       On 10/03/2024, patient should be placed in hospital observation services under my care.    Advance Care Planning   I spent less than 16 minutes discussing advance care planning.   Patient is FULL CODE on discussion with her.  Patient's surrogate decision maker is her daughter.           Arnie Bah MD  Department of Hospital Medicine  [facility] Surg

## 2024-10-04 NOTE — PLAN OF CARE
Problem: Adult Inpatient Plan of Care  Goal: Plan of Care Review  Outcome: Progressing  Flowsheets (Taken 10/4/2024 0630)  Plan of Care Reviewed With: patient     Problem: Fall Injury Risk  Goal: Absence of Fall and Fall-Related Injury  Outcome: Progressing  Intervention: Promote Injury-Free Environment  Flowsheets (Taken 10/4/2024 0630)  Safety Promotion/Fall Prevention:   assistive device/personal item within reach   bed alarm set   side rails raised x 3   instructed to call staff for mobility

## 2024-10-04 NOTE — NURSING
Received admit from pacu s/p lung biopsy 72 y/o female. Aaox4, no distress noted. No complaints at present. Will continue to monitor.

## 2024-10-04 NOTE — ASSESSMENT & PLAN NOTE
Body mass index is 42.61 kg/m².; chronic comorbidity affecting medical decision-making. Patient would greatly benefit from weight loss through proper diet and increased physical activity.

## 2024-10-04 NOTE — ASSESSMENT & PLAN NOTE
Patient has Diastolic (HFpEF) heart failure that is Chronic, however some hypervolemia may be contributing to hypoxia as above. BNP ordered. Trial of IV diuresis as above.   Current Heart Failure Medications  lisinopriL tablet 40 mg, Nightly, Oral  furosemide injection 40 mg, Once, Intravenous    Plan  - Monitor strict I&Os and daily weights.    - Place on telemetry  - Low sodium diet  - Cardiology has not been consulted

## 2024-10-04 NOTE — PLAN OF CARE
[facility] Med Surg  Discharge Final Note    Primary Care Provider: Lei Garcia MD    Expected Discharge Date: 10/4/2024    Final Discharge Note (most recent)       Final Note - 10/04/24 1133          Final Note    Assessment Type Final Discharge Note     Anticipated Discharge Disposition Home or Self Care     What phone number can be called within the next 1-3 days to see how you are doing after discharge? [phone]     Hospital Resources/Appts/Education Provided Provided patient/caregiver with written discharge plan information;Appointments scheduled and added to S        Post-Acute Status    Patient choice form signed by patient/caregiver List with quality metrics by geographic area provided     Discharge Delays None known at this time                   Patient to discharge to home today. No case management needs at this time.     Contact Info       Lei Garcia MD   Specialty: Internal Medicine   Relationship: PCP - General  Hypertension Digital Medicine Responsible Provider    [address]  [facility] [address]   Phone: [phone]       Next Steps: Follow up          Future Appointments   Date Time Provider Department Center   10/11/2024 10:00 AM INJECTION, [facility] CHEMO [facility] CHEMO [facility]   10/14/2024  1:00 PM Lei Garcia MD [facility] IM [address] [facility]   10/17/2024  9:00 AM Lei Garcia MD [facility] IM [address] [facility]   10/22/2024  8:30 AM LAB, [facility]ON[facility] CANCER BLDG [facility] LAB [facility] [address] [facility]   10/22/2024  9:30 AM Rossana Motley MD [facility] HEMONC[facility] [address]   12/6/2024 10:00 AM Savanna Hyatt, PADMINI [facility] PAINMGT [facility]   12/18/2024 10:30 AM Mago Banks FNP [facility] IM [address] [facility]   6/19/2025  9:40 AM Lei Garcia MD [facility] IM [address] [facility]     Cindy Tabor MSW  [facility]   Ext: [phone]

## 2024-10-04 NOTE — NURSING
Discharged orders reviewed with patient. Patient verbalized understanding.Discharge documentation given to patient. Patient escorted to her transportation via wheelchair by MARISELA Ambriz.

## 2024-10-04 NOTE — HOSPITAL COURSE
Ca Coats is a 73 y.o. female with history of gastric adenocarcinoma, significant smoking history with likely COPD, multiple pulmonary nodules, HTN, HLD, morbid obesity, LAURA, chronic pain on opioids, history of DVT, intracranial aneurysms who presents with hypoxia after IR lung nodule biopsy today. After routine scheduled IR lung nodule biopsy today, reportedly with small local hemorrhage and became hypoxic  to the mid 80's after extubation in the PACU and placed on nasal canula @ 2L. She denied any new SOB, cough, or other significant symptoms at this time. Likely has underlying COPD as interstitial disease noted on CTs from at least 2021 vs  post-extubation hypoxia. BNP mildly elevated and given lasix IV x1. LE dopplers negative. Patient weaned to room air. Patient seen and evaluated on day of discharge. Pt deemed appropriate for discharge. Plan discussed with pt, who was agreeable and amenable; medications were discussed and reviewed, outpatient follow-up scheduled, ER precautions were given, all questions were answered to the pt's satisfaction, and patient was subsequently discharged.

## 2024-10-04 NOTE — ASSESSMENT & PLAN NOTE
Reports current smoking, 3-4 cigarettes per day. Would benefit from cessation. Will order Nicotine patch while admitted if desired.

## 2024-10-04 NOTE — PLAN OF CARE
APPOINTMENT:    Patient Appointment(s) scheduled with   Lei Garcia MD  Thursday Oct 17, 2024 9:00 AM    Lei Garcia requires schedule review by Office Nurse - Office to call patient with date and time to meet 1 week follow up criteria

## 2024-10-04 NOTE — DISCHARGE SUMMARY
[facility]  Discharge Summary      Patient Name: Ca Coats  MRN: [id number]  [facility]: [id number]  Patient Class: OP- Observation  Admission Date: 10/3/2024  Hospital Length of Stay: 0 days  Discharge Date and Time:  10/04/2024 10:14 AM  Attending Physician: Lester Gupta MD   Discharging Provider: Lester Gupta MD  Primary Care Provider: Lei Garcia MD  [facility] Medicine Team: [facility] Lester Gupta MD  Primary Care Team: [facility]    HPI:   Ca Coats is a 73 y.o. female with history of gastric adenocarcinoma, significant smoking history with likely COPD, multiple pulmonary nodules, HTN, HLD, morbid obesity, LAURA, chronic pain on opioids, history of DVT, intracranial aneurysms who presents with hypoxia after IR lung nodule biopsy today.     She underwent routine scheduled IR lung nodule biopsy today, reportedly with small local hemorrhage. However, she reportedly became hypoxic to the mid-80s after extubation in the PACU. She denies any new SOB, cough, or other significant symptoms at this time.     She does admit to chronic exertional dyspnea, which is at baseline. Also endorses chronic LE edema, L>R, which improves with elevation. On further history, she is no longer taking Eliquis as she completed her 6 months of therapy around 2 months ago. She is not on home oxygen and reports that she wears her CPAP at night. Still smokes 3-4 cigarettes per day.    * No procedures listed *      Hospital Course:   Ca Coats is a 73 y.o. female with history of gastric adenocarcinoma, significant smoking history with likely COPD, multiple pulmonary nodules, HTN, HLD, morbid obesity, LAURA, chronic pain on opioids, history of DVT, intracranial aneurysms who presents with hypoxia after IR lung nodule biopsy today. After routine scheduled IR lung nodule biopsy today, reportedly with small local hemorrhage and became hypoxic  to the mid 80's  after extubation in the PACU and placed on nasal canula @ 2L. She denied any new SOB, cough, or other significant symptoms at this time. Likely has underlying COPD as interstitial disease noted on CTs from at least 2021 vs  post-extubation hypoxia. BNP mildly elevated and given lasix IV x1. LE dopplers negative. Patient weaned to room air. Patient seen and evaluated on day of discharge. Pt deemed appropriate for discharge. Plan discussed with pt, who was agreeable and amenable; medications were discussed and reviewed, outpatient follow-up scheduled, ER precautions were given, all questions were answered to the pt's satisfaction, and patient was subsequently discharged.     Goals of Care Treatment Preferences:  Code Status: Full Code         Consults:   Consults (From admission, onward)          Status Ordering Provider     Inpatient consult to Social Work/Case Management  Once        Provider:  (Not yet assigned)    Acknowledged YADY FUENTES            No new Assessment & Plan notes have been filed under this hospital service since the last note was generated.  Service: Hospital Medicine    Final Active Diagnoses:    Diagnosis Date Noted POA    PRINCIPAL PROBLEM:  Hypoxia [R09.02] 10/03/2024 Yes    Lung disease [J98.4] 10/03/2024 Yes     Chronic    Pulmonary nodules/lesions, multiple [R91.8] 06/13/2024 Yes     Chronic    History of DVT (deep vein thrombosis) [Z86.718] 10/15/2021 Not Applicable     Chronic    Chronic pain with opioid dependence [F11.20] 07/30/2021 Yes     Chronic    Iron deficiency anemia secondary to blood loss (chronic) [D50.0] 07/21/2021 Yes     Chronic    Tobacco abuse [Z72.0] 01/28/2021 Yes     Chronic    Chronic diastolic congestive heart failure [I50.32] 01/27/2021 Yes     Chronic    Pure hypercholesterolemia [E78.00] 01/27/2021 Yes     Chronic    Essential hypertension [I10] 04/29/2019 Yes     Chronic    Morbid obesity with BMI of 45.0-49.9, adult [E66.01, Z68.42] 04/29/2019 Not  Applicable     Chronic    Obstructive sleep apnea [G47.33] 08/09/2018 Yes     Chronic      Problems Resolved During this Admission:       Discharged Condition: good    Disposition: Home or Self Care    Follow Up:   Follow-up Information       Lei Garcia MD Follow up.    Specialty: Internal Medicine  Contact information:  [address]  [place] [address]  [phone]                           Patient Instructions:   No discharge procedures on file.    Significant Diagnostic Studies: N/A    Pending Diagnostic Studies:       Procedure Component Value Units Date/Time    Basic Metabolic Panel [[id number]] Collected: 10/04/24 0211    Order Status: Sent Lab Status: No result     Specimen: Blood     Radiology-guided Core Biopsy with Path Adequacy [[id number]] Collected: 10/03/24 1105    Order Status: Sent Lab Status: In process Updated: 10/03/24 1144           Medications:  Reconciled Home Medications:      Medication List        CHANGE how you take these medications      amLODIPine 10 MG tablet  Commonly known as: NORVASC  Take 1 tablet (10 mg total) by mouth once daily.  What changed: when to take this     lisinopriL 40 MG tablet  Commonly known as: PRINIVILZESTRIL  Take 1 tablet (40 mg total) by mouth once daily.  What changed: when to take this            CONTINUE taking these medications      acetaminophen 500 MG tablet  Commonly known as: TYLENOL  Take by mouth daily as needed.     aspirin 325 MG EC tablet  Commonly known as: ECOTRIN  Take 1 tablet (325 mg total) by mouth once daily.     atorvastatin 20 MG tablet  Commonly known as: LIPITOR  Take 1 tablet (20 mg total) by mouth once daily.     B-complex with vitamin C tablet  Commonly known as: Z-Bec or Equiv  Take 1 tablet by mouth once daily.     CALCIUM CITRATE-VITAMIN D2 ORAL  Take 1 tablet by mouth once daily.     citalopram 10 MG tablet  Commonly known as: CeleXA  Take 1 tablet (10 mg total) by mouth once daily.     ferrous gluconate 324 MG  tablet  Commonly known as: FERGON  TAKE 1 TABLET (324 MG TOTAL) BY MOUTH DAILY WITH BREAKFAST.     furosemide 80 MG tablet  Commonly known as: LASIX  Take 1 tablet (80 mg total) by mouth 2 (two) times daily.     gabapentin 300 MG capsule  Commonly known as: NEURONTIN  Take 2 capsules (600 mg total) by mouth 3 (three) times daily.     multivitamin with minerals tablet  Take 1 tablet by mouth once daily.     oxyCODONE-acetaminophen 5-325 mg per tablet  Commonly known as: PERCOCET  Take 1 tablet by mouth every 8 (eight) hours as needed for Pain.     OZEMPIC 1 mg/dose (4 mg/3 mL)  Generic drug: semaglutide  Inject 1 mg into the skin every 7 days.     pantoprazole 40 MG tablet  Commonly known as: PROTONIX  Take 1 tablet (40 mg total) by mouth once daily.            STOP taking these medications      clopidogreL 75 mg tablet  Commonly known as: PLAVIX              Indwelling Lines/Drains at time of discharge:   Lines/Drains/Airways       Central Venous Catheter Line  Duration                  PowerPort A Cath Single Lumen 07/09/21 1135 right internal jugular 1182 days                    Time spent on the discharge of patient: 35 minutes         Lester Gupta MD  Department of Hospital Medicine  [facility]

## 2024-10-04 NOTE — ASSESSMENT & PLAN NOTE
Patient with history of RLE DVT, and reports that she completed 6 months of Eliquis. Given hypoxia and LLE>RLE, will obtain LE dopplers to assess for recurrent DVT as she may need to resume anticoagulation.

## 2024-10-04 NOTE — ASSESSMENT & PLAN NOTE
Chronic, and currently controlled. Latest blood pressure and vitals reviewed-   While in the hospital, will manage blood pressure as follows; Continue home antihypertensive regimen    Will utilize p.r.n. blood pressure medication only if patient's blood pressure greater than 220/110 and she develops symptoms such as worsening chest pain or shortness of breath.

## 2024-10-04 NOTE — ASSESSMENT & PLAN NOTE
Presents with hypoxia following intubation/IR pulmonary nodule biopsy today. Sats reportedly mid-80s on room air in the PACU, resolved with low-flow NC. CXR shows mild pulmonary edema without focal consolidation or PTX. Likely has underlying COPD as interstitial disease noted on CTs from at least 2021. Will obtain ABG for better respiratory assessment. She has no new complaints and appears well without wheezing.   -May be post-extubation hypoxia with possible mild volume overload and morbid obesity; will trial a dose of IV lasix. Monitor UOP.   -Given history of DVT and no longer on anticoagulation, will obtain DVT study, and if positive, would consider PE and need to resume anticoagulation.   -Wean oxygen to maintain sats 88% or greater, and will encourage nightly CPAP use.

## 2024-10-04 NOTE — HPI
Ca Coats is a 73 y.o. female with history of gastric adenocarcinoma, significant smoking history with likely COPD, multiple pulmonary nodules, HTN, HLD, morbid obesity, LAURA, chronic pain on opioids, history of DVT, intracranial aneurysms who presents with hypoxia after IR lung nodule biopsy today.     She underwent routine scheduled IR lung nodule biopsy today, reportedly with small local hemorrhage. However, she reportedly became hypoxic to the mid-80s after extubation in the PACU. She denies any new SOB, cough, or other significant symptoms at this time.     She does admit to chronic exertional dyspnea, which is at baseline. Also endorses chronic LE edema, L>R, which improves with elevation. On further history, she is no longer taking Eliquis as she completed her 6 months of therapy around 2 months ago. She is not on home oxygen and reports that she wears her CPAP at night. Still smokes 3-4 cigarettes per day.

## 2024-10-04 NOTE — SUBJECTIVE & OBJECTIVE
Past Medical History:   Diagnosis Date    YOUNG (acute kidney injury) 10/15/2021    Anemia     2018    Arthritis     BMI 60.0-69.9, adult     Cataract     Chronic diastolic congestive heart failure 01/27/2021    Deep vein thrombosis     Depression     Dry eye syndrome     DVT of deep femoral vein, right 05/29/2023    Gastric adenocarcinoma 05/25/2021    GERD (gastroesophageal reflux disease)     Glaucoma suspect     History of gastric ulcer     History of psychiatric hospitalization     Hx of psychiatric care     Celexa, no recall of charted Effexor, Lexapro, Desyrel prescriptions    Hyperlipidemia     Hypertension     Hypothyroidism     Morbid obesity     Neuromuscular disorder     Sleep apnea     Thyroid disease        Past Surgical History:   Procedure Laterality Date    APPENDECTOMY      CATARACT EXTRACTION W/  INTRAOCULAR LENS IMPLANT Bilateral     MFIOL OU    CORONARY ANGIOGRAPHY Bilateral 02/24/2021    Procedure: ANGIOGRAM, CORONARY ARTERY;  Surgeon: Cresencio Ridley MD;  Location: [facility] CATH LAB;  Service: Cardiology;  Laterality: Bilateral;  Covid test needed - ok per Danay SSCU. Pt. w/o transportation or family    ENDOSCOPIC ULTRASOUND OF UPPER GASTROINTESTINAL TRACT N/A 03/17/2021    Procedure: ULTRASOUND, UPPER GI TRACT, ENDOSCOPIC;  Surgeon: Gerald Anaya MD;  Location: [facility] ([address] [place] [address]);  Service: Endoscopy;  Laterality: N/A;  Pt unable to get a ride for swab. Will do rapid test at 8:30 - ttr    ENDOSCOPIC ULTRASOUND OF UPPER GASTROINTESTINAL TRACT N/A 01/13/2022    Procedure: ULTRASOUND, UPPER GI TRACT, ENDOSCOPIC;  Surgeon: Kevin Carballo MD;  Location: [address]);  Service: Endoscopy;  Laterality: N/A;  blood thinner approval received, see telephone encounter 1/7/22-BB  rapid  instructions given verbally and sent to address on file in pt's chart-BB    ENDOSCOPIC ULTRASOUND OF UPPER GASTROINTESTINAL TRACT N/A 10/11/2022    Procedure: ULTRASOUND, UPPER GI TRACT, ENDOSCOPIC;  Surgeon:  Dequan Ridley MD;  Location: [facility] ENDO (2ND FLR);  Service: Endoscopy;  Laterality: N/A;    ENDOSCOPIC ULTRASOUND OF UPPER GASTROINTESTINAL TRACT N/A 01/24/2024    Procedure: ULTRASOUND, UPPER GI TRACT, ENDOSCOPIC;  Surgeon: Kevin Carballo MD;  Location: [facility] ENDO (2ND FLR);  Service: Endoscopy;  Laterality: N/A;    ENDOSCOPIC ULTRASOUND OF UPPER GASTROINTESTINAL TRACT N/A 03/05/2024    Procedure: ULTRASOUND, UPPER GI TRACT, ENDOSCOPIC;  Surgeon: Kevin Carballo MD;  Location: [facility] ENDO (2ND FLR);  Service: Endoscopy;  Laterality: N/A;  1/25 portal instr-Repeat theEUS at the next available appointment because the preparation was poor. 48hrs of liquid. Ozempic 7 day hold-eliquis approved Dr. Pelaez TE 12/12/2023-tt  2/28-precall complete-pt verbalized udnerstanding of holding Oz    ESOPHAGOGASTRODUODENOSCOPY N/A 02/05/2021    Procedure: EGD (ESOPHAGOGASTRODUODENOSCOPY);  Surgeon: Matthew Hernadez MD;  Location: [facility] (2ND FLR);  Service: Endoscopy;  Laterality: N/A;  BMI-58    Wt: 361#     COVID test at [facility] on 2/2-GT    ESOPHAGOGASTRODUODENOSCOPY N/A 03/17/2021    Procedure: EGD (ESOPHAGOGASTRODUODENOSCOPY);  Surgeon: Gerald Anaya MD;  Location: [facility] ENDO (2ND FLR);  Service: Endoscopy;  Laterality: N/A;    ESOPHAGOGASTRODUODENOSCOPY N/A 05/25/2021    Procedure: EGD (ESOPHAGOGASTRODUODENOSCOPY);  Surgeon: Kevin Carballo MD;  Location: [facility] ENDO (2ND FLR);  Service: Endoscopy;  Laterality: N/A;  ESD 2 hours  Full COVID vaccination on file. ttr    ESOPHAGOGASTRODUODENOSCOPY N/A 01/13/2022    Procedure: EGD (ESOPHAGOGASTRODUODENOSCOPY);  Surgeon: Kevin Carballo MD;  Location: [facility] ENDO (2ND FLR);  Service: Endoscopy;  Laterality: N/A;  blood thinner approval, see telephone encounter 1/7/22-BB  rapid  instructions given verbally and sent to address on file in pt's chart-BB    ESOPHAGOGASTRODUODENOSCOPY N/A 10/11/2022    Procedure: EGD (ESOPHAGOGASTRODUODENOSCOPY);  Surgeon: Dequan Ridley MD;   Location: [facility] ENDO (2ND FLR);  Service: Endoscopy;  Laterality: N/A;  She is do for EGD/EUS now. Personal history of gastric cancer. Ca Coats #[id number].   Thanks,   Kevin Fuentes MD    Pt is fully vaccinated-DS  9/14/22-Approval to hold Eliquis rec'd from Dr. Pelaez (see telephone encounter 9/14/22)-DS  9/14/22-Ins    ESOPHAGOGASTRODUODENOSCOPY N/A 01/24/2024    Procedure: EGD (ESOPHAGOGASTRODUODENOSCOPY);  Surgeon: Kevin Carballo MD;  Location: [facility] ENDO (2ND FLR);  Service: Endoscopy;  Laterality: N/A;  12/8/23: instructions sent via portal. eliquis approval from MD Peleaz in telephone encounter (12/12/23) -GD  1/9-precall complete-MS    ESOPHAGOGASTRODUODENOSCOPY N/A 03/05/2024    Procedure: EGD (ESOPHAGOGASTRODUODENOSCOPY);  Surgeon: Kevin Carballo MD;  Location: [facility] ENDO (2ND FLR);  Service: Endoscopy;  Laterality: N/A;    EYE SURGERY      HYSTEROSCOPIC POLYPECTOMY OF UTERUS  01/10/2024    Procedure: POLYPECTOMY, UTERUS, HYSTEROSCOPIC;  Surgeon: Pina Pickens MD;  Location: [facility] OR;  Service: OB/GYN;;    HYSTEROSCOPY WITH DILATION AND CURETTAGE OF UTERUS N/A 01/10/2024    Procedure: HYSTEROSCOPY, WITH DILATION AND CURETTAGE OF UTERUS;  Surgeon: Pina Pickens MD;  Location: [facility] OR;  Service: OB/GYN;  Laterality: N/A;    INJECTION OF ANESTHETIC AGENT AROUND NERVE Left 07/10/2018    Procedure: BLOCK, NERVE;  Surgeon: Samantha Vidal MD;  Location: [facility] PAIN MGT;  Service: Pain Management;  Laterality: Left;  Genicular     INJECTION OF ANESTHETIC AGENT AROUND NERVE Left 07/19/2019    Procedure: Block, Nerve NERVE BLOCK GENICULAR WITH PHENOL 6%;  Surgeon: Samantha Vidal MD;  Location: [facility] PAIN MGT;  Service: Pain Management;  Laterality: Left;  NEEDS CONSENT    INSERTION OF TUNNELED CENTRAL VENOUS CATHETER (CVC) WITH SUBCUTANEOUS PORT N/A 07/09/2021    Procedure: INSERTION, PORT-A-CATH;  Surgeon: Mario Paz MD;  Location: [facility] CATH LAB;  Service: Radiology;  Laterality:  N/A;  PORT PLACEMENT    RADIOFREQUENCY ABLATION Left 06/19/2020    Procedure: RADIOFREQUENCY ABLATION LEFT GENICULAR;  Surgeon: Tevin Clark MD;  Location: [facility] PAIN MGT;  Service: Pain Management;  Laterality: Left;  Left Genicular RFA    RADIOFREQUENCY ABLATION Left 01/22/2021    Procedure: RADIOFREQUENCY ABLATION LEFT GENICULAR;  Surgeon: Tevin Clark MD;  Location: [facility] PAIN MGT;  Service: Pain Management;  Laterality: Left;    RADIOFREQUENCY ABLATION Left 07/30/2021    Procedure: RADIOFREQUENCY ABLATION, GENICULAR COOLED NEED CONSENT;  Surgeon: Tevin Clark MD;  Location: [facility] PAIN MGT;  Service: Pain Management;  Laterality: Left;    RADIOFREQUENCY ABLATION Left 04/22/2022    Procedure: RADIOFREQUENCY ABLATION, GENICULAR COOLED , LEFT;  Surgeon: Tevin Clark MD;  Location: [facility]IN [facility]GT;  Service: Pain Management;  Laterality: Left;    RADIOFREQUENCY ABLATION Left 12/23/2022    Procedure: RADIOFREQUENCY ABLATION LEFT GENICULAR COOLED CLEARED TO HOLD ELIQUIS 3 DAYS  NEEDS CONSENT;  Surgeon: Tevin Clark MD;  Location: [facility]IN [facility]GT;  Service: Pain Management;  Laterality: Left;    TONSILLECTOMY         Review of patient's allergies indicates:  No Known Allergies    Current Facility-Administered Medications on File Prior to Encounter   Medication    0.9%  NaCl infusion    0.9%  NaCl infusion    sodium chloride 0.9% flush 10 mL     Current Outpatient Medications on File Prior to Encounter   Medication Sig    amLODIPine (NORVASC) 10 MG tablet Take 1 tablet (10 mg total) by mouth once daily. (Patient taking differently: Take 10 mg by mouth every morning.)    atorvastatin (LIPITOR) 20 MG tablet Take 1 tablet (20 mg total) by mouth once daily.    B-complex with vitamin C (Z-BEC OR EQUIV) tablet Take 1 tablet by mouth once daily.     CALCIUM CITRATE-VITAMIN D2 ORAL Take 1 tablet by mouth once daily.     citalopram (CELEXA) 10 MG tablet Take 1 tablet (10 mg total) by mouth once daily.    furosemide  (LASIX) 80 MG tablet Take 1 tablet (80 mg total) by mouth 2 (two) times daily.    gabapentin (NEURONTIN) 300 MG capsule Take 2 capsules (600 mg total) by mouth 3 (three) times daily.    lisinopriL (PRINIVIL,ZESTRIL) 40 MG tablet Take 1 tablet (40 mg total) by mouth once daily. (Patient taking differently: Take 40 mg by mouth every evening.)    multivitamin with minerals tablet Take 1 tablet by mouth once daily.     oxyCODONE-acetaminophen (PERCOCET) 5-325 mg per tablet Take 1 tablet by mouth every 8 (eight) hours as needed for Pain.    pantoprazole (PROTONIX) 40 MG tablet Take 1 tablet (40 mg total) by mouth once daily.    acetaminophen (TYLENOL) 500 MG tablet Take by mouth daily as needed.    aspirin (ECOTRIN) 325 MG EC tablet Take 1 tablet (325 mg total) by mouth once daily.    clopidogreL (PLAVIX) 75 mg tablet Take 1 tablet (75 mg total) by mouth once daily. (Patient not taking: Reported on 9/10/2024)    ferrous gluconate (FERGON) 324 MG tablet TAKE 1 TABLET (324 MG TOTAL) BY MOUTH DAILY WITH BREAKFAST.    semaglutide (OZEMPIC) 1 mg/dose (4 mg/3 mL) Inject 1 mg into the skin every 7 days. (Patient taking differently: Inject 1 mg into the skin every 7 days. )     Family History       Problem Relation (Age of Onset)    No Known Problems Mother, Father          Tobacco Use    Smoking status: Some Days     Current packs/day: 0.00     Average packs/day: 1 pack/day for 53.9 years (53.7 ttl pk-yrs)     Types: Cigarettes     Start date:      Last attempt to quit: 2023     Years since quittin.8    Smokeless tobacco: Never    Tobacco comments:     currently smoking <5 cigarettes most days   Substance and Sexual Activity    Alcohol use: Yes     Alcohol/week: 1.0 standard drink of alcohol     Types: 1 Glasses of wine per week     Comment: rarely    Drug use: No    Sexual activity: Not Currently     Partners: Male     Review of Systems   All other systems reviewed and are negative.    Objective:      Vital Signs (Most Recent):  Temp: 98.6 °F (37 °C) (10/03/24 2030)  Pulse: 81 (10/03/24 2030)  Resp: 16 (10/03/24 2030)  BP: (!) 125/59 (10/03/24 2030)  SpO2: 97 % (10/03/24 2030) Vital Signs (24h Range):  Temp:  [97.5 °F (36.4 °C)-98.6 °F (37 °C)] 98.6 °F (37 °C)  Pulse:  [71-91] 81  Resp:  [12-25] 16  SpO2:  [87 %-100 %] 97 %  BP: (125-178)/(55-80) 125/59     Weight: 119.7 kg (264 lb)  Body mass index is 42.61 kg/m².     Physical Exam  Vitals and nursing note reviewed.   Constitutional:       General: She is not in acute distress.     Appearance: She is well-developed. She is obese. She is not ill-appearing or diaphoretic.   HENT:      Head: Normocephalic and atraumatic.   Eyes:      General: No scleral icterus.     Conjunctiva/sclera: Conjunctivae normal.   Neck:      Vascular: No JVD.   Cardiovascular:      Rate and Rhythm: Normal rate and regular rhythm.   Pulmonary:      Effort: Pulmonary effort is normal. No respiratory distress.      Breath sounds: Rales present. No wheezing.      Comments: Rales in BL bases, speaks in full sentences without tachypnea or respiratory distress   Musculoskeletal:      Right lower leg: Edema present.      Left lower leg: Edema present.      Comments: Mild bilateral LE pitting edema, L circumference >R   Skin:     Coloration: Skin is not jaundiced or pale.   Neurological:      Mental Status: She is alert and oriented to person, place, and time.      Motor: No abnormal muscle tone.   Psychiatric:         Mood and Affect: Mood normal.         Behavior: Behavior normal.                Significant Labs: All pertinent labs within the past 24 hours have been reviewed.    Ordered CBC, BMP, BNP, ABG    Significant Imaging: I have reviewed all pertinent imaging results/findings within the past 24 hours.

## 2024-10-04 NOTE — ASSESSMENT & PLAN NOTE
Prior CTs with evidence of chronic interstitial lung disease at least since 2021; highly suspect COPD given significant smoking history. Currently without evidence of acute exacerbation. Management as above.      SAME

## 2024-10-08 ENCOUNTER — EPISODE CHANGES (OUTPATIENT)
Dept: CARDIOLOGY | Facility: CLINIC | Age: 74
End: 2024-10-08

## 2024-10-08 ENCOUNTER — TELEPHONE (OUTPATIENT)
Dept: CARDIOLOGY | Facility: CLINIC | Age: 74
End: 2024-10-08
Payer: MEDICARE

## 2024-10-08 ENCOUNTER — TELEPHONE (OUTPATIENT)
Dept: NEUROSURGERY | Facility: CLINIC | Age: 74
End: 2024-10-08
Payer: MEDICARE

## 2024-10-08 DIAGNOSIS — I67.1 CEREBRAL ANEURYSM, NONRUPTURED: Primary | ICD-10-CM

## 2024-10-08 DIAGNOSIS — R06.02 SOB (SHORTNESS OF BREATH): Primary | ICD-10-CM

## 2024-10-08 NOTE — TELEPHONE ENCOUNTER
"[facility] Phone Enrollment Complete.    Phone enrollment completed due to : PT D/C from Hosp early    Called and spoke to PT via phone. Introduced self to pt as HFTCC nurse navigator.      Patient education will be given: On this Phone call and @ HFTC appt    Reviewed the following key points of HFTCC program with pt:              1.) Take your medications as directed. Call Ms Adwoa if any health Care Professional changes your Heart/Fluid medications.               2.) Weight yourself daily. Daily Dry Weights. Upon waking ,empty your bladder, weigh with as little clothes as possible before eating/drinking. Record weight in Symptom Tracker and compare these weights for fluid gain. Weight gain overnight of 3-4 lbs is Fluid, also a gain of 5 lbs in 3 days is Fluid as well. Call US!              3.) Follow low salt and limited fluid diet. Salt/Sodium Restriction 2459-9380 mg, see page 4. Sodium makes you hold onto Fluid. High Sodium Foods;Deli Meat/Cheese, Sausages/Hot Dogs, Fast Food/Restaurant Food, Anything in a box, bottle or can. Cook with Fresh or Frozen ingredients and use seasonings that are labeled NO SALT. Check Portion Sizes, Salt is reported for 1 portion. A can may contain 2-3 portions. Fluid Restriction 1.5 -2 Liters/Day, see page 6, measure all of your Fluid, the milk in your cereal, broth in your soup and ice cream because anything that melts at room temp is a liquid.              4.) Stop smoking and start exercising. Brisk walking is good, don't walk like you are going to the [employer] and DON"T WALK IN THE HEAT! Start low and increase as tolerated. Remember if you don't use it you lose it.              5.) Go to your appointments and call your team. Have your weight and BP/P ready when we call to do the Phone Check ins and call us if you have fluid gain or questions             Reviewed plan for follow up once discharged to include phone calls, in person and virtual visits " to assist pt optimizing their heart failure medication regimen and encouraging healthy lifestyle modifications.  Reminded pt that program will assist them over the next 4-6 weeks and then patient will be transferred to long term care provider .  Reminded pt how to contact HFTCC navigator via phone and or via [employer].      Pt given the choice to do a phone call until she can have an HFTCC appt on 10-22-24, she will consider this and I will call her back on 10-9-24          PT can verbalize read back of some of the  information given.  Encouraged pt and family to read over information often and contact team with any questions or concerns.     PT requests HFTCC appt on 10-22 when she has appt @ [facility]

## 2024-10-09 ENCOUNTER — TELEPHONE (OUTPATIENT)
Dept: CARDIOLOGY | Facility: CLINIC | Age: 74
End: 2024-10-09
Payer: MEDICARE

## 2024-10-09 LAB
ADEQUACY: NORMAL
FINAL PATHOLOGIC DIAGNOSIS: NORMAL
GROSS: NORMAL
Lab: NORMAL
MICROSCOPIC EXAM: NORMAL

## 2024-10-09 NOTE — TELEPHONE ENCOUNTER
"Heart Failure Transitional Care Clinic    Attempted to call pt to complete 24-72hour post discharge "check in" call. Unable to reach pt at listed phone numbers.  Was able to leave message on voicemail encouraging [facility]..     Will continue to try to reach patient.    "

## 2024-10-10 ENCOUNTER — TELEPHONE (OUTPATIENT)
Dept: NEUROSURGERY | Facility: CLINIC | Age: 74
End: 2024-10-10
Payer: MEDICARE

## 2024-10-10 NOTE — TELEPHONE ENCOUNTER
Called and spoke with pts vilma Snyder. Informed surgery scheduled for 11/20/24. Anesthesia dept will call to schedule appt. Pt to arrive for 6am DOSC. Lillian gamble.

## 2024-10-11 ENCOUNTER — INFUSION (OUTPATIENT)
Dept: INFUSION THERAPY | Facility: OTHER | Age: 74
End: 2024-10-11
Attending: STUDENT IN AN ORGANIZED HEALTH CARE EDUCATION/TRAINING PROGRAM
Payer: MEDICARE

## 2024-10-11 ENCOUNTER — TELEPHONE (OUTPATIENT)
Dept: CARDIOLOGY | Facility: CLINIC | Age: 74
End: 2024-10-11
Payer: MEDICARE

## 2024-10-11 DIAGNOSIS — C16.9 GASTRIC ADENOCARCINOMA: Primary | ICD-10-CM

## 2024-10-11 PROCEDURE — 25000003 PHARM REV CODE 250: Performed by: STUDENT IN AN ORGANIZED HEALTH CARE EDUCATION/TRAINING PROGRAM

## 2024-10-11 PROCEDURE — 96523 IRRIG DRUG DELIVERY DEVICE: CPT

## 2024-10-11 PROCEDURE — 63600175 PHARM REV CODE 636 W HCPCS: Performed by: STUDENT IN AN ORGANIZED HEALTH CARE EDUCATION/TRAINING PROGRAM

## 2024-10-11 PROCEDURE — A4216 STERILE WATER/SALINE, 10 ML: HCPCS | Performed by: STUDENT IN AN ORGANIZED HEALTH CARE EDUCATION/TRAINING PROGRAM

## 2024-10-11 RX ORDER — HEPARIN 100 UNIT/ML
500 SYRINGE INTRAVENOUS
Status: DISCONTINUED | OUTPATIENT
Start: 2024-10-11 | End: 2024-10-11 | Stop reason: HOSPADM

## 2024-10-11 RX ORDER — SODIUM CHLORIDE 0.9 % (FLUSH) 0.9 %
10 SYRINGE (ML) INJECTION
Status: DISCONTINUED | OUTPATIENT
Start: 2024-10-11 | End: 2024-10-11 | Stop reason: HOSPADM

## 2024-10-11 RX ORDER — HEPARIN 100 UNIT/ML
500 SYRINGE INTRAVENOUS
OUTPATIENT
Start: 2024-10-11

## 2024-10-11 RX ORDER — SODIUM CHLORIDE 0.9 % (FLUSH) 0.9 %
10 SYRINGE (ML) INJECTION
OUTPATIENT
Start: 2024-10-11

## 2024-10-11 RX ADMIN — HEPARIN 500 UNITS: 100 SYRINGE at 10:10

## 2024-10-11 RX ADMIN — SODIUM CHLORIDE, PRESERVATIVE FREE 10 ML: 5 INJECTION INTRAVENOUS at 10:10

## 2024-10-11 NOTE — TELEPHONE ENCOUNTER
"Heart Failure Transitional Care Clinic(HFTCC) hospital discharge 48-72 hour phone follow up completed.     Most Recent Hospital Discharge Date: 10-4-2024  Last admission Diagnosis/chief complaint: SOB    TCC RN Navigator spoke with PT    Current Patient reported weight: Did not weigh    Current Patient reported blood pressure and heart rate: Did not check    Pt reports the following:  []  Shortness of Breath with Activity  []  Shortness of Breath at rest   []  Fatigue  [x]  Edema  a little to her legs, PT does elevate higher than heart  [] Chest pain or tightness  [] Weight Increase since discharge  [] None of the above    Medications:   Discharge medication reviewed with pt.  Pt reports having medication list available and has all medications at home for use per list. PT has lots of Diuretic Pills    Education:    PT to receive "Home Care Guide for Heart Failure Patients" via her e-mail.   Reviewed key points as listed below.     Recommend 2 -3 gram sodium restriction and 1500 cc-2000 cc fluid restriction.  Encourage physical activity with graded exercise program.  Requested patient to weigh themselves daily, and to notify us if their weight increases by more than 3 lbs in 1 day or 5 lbs in 3 days.   Reminded patient to use "Daily weight and symptom tracker" to record and guide patient on when and how to call HFTCC. PT may also use symptom tracker if no scale available  Pt reports being in the GREEN(color) Zone. If in yellow/red, reminded that they should be calling HFTCC today or now.     Watch for these Signs and Symptoms: If any of these occur, contact HFTCC immediately:   Increase in shortness of breath with movement   Increase in swelling in your legs and ankles   Weight gain of more than 3 pounds in a day or 5 pounds in 3 days.   Difficulty breathing when you are lying down   Worsening fatigue or tiredness   Stomach bloating, a full feeling or a loss of appetite   Increased coughing--especially when you are " lying down      Pt was able to verbalize back to RN in their own words correct diet/fluid restrictions, necessity for exercise, warning signs and symptoms, when and how to contact their TCC team.      Pt educated on follow-up plan while in HFTCC program to include:   Week 1 -  F/u appt with Provider and RN (Weekly calls) HFTCC appt 10-22-24 10:30 as requested to coincide with [facility] appt.   Week 2-5 - In person/ Virtual/ phone call check ins    Week 5-7 - Pt will discharge from HFTCC and transition to longterm care provider (Cardiology/PCP/ Advanced Heart Failure).      Patient active on myChart? Yes      Pt given the following contact information for ease of communication: [phone] (Mon-Fri, 8a-5p) & for urgent issues on the weekend to page the Heart Transplant MD on call.  Pt also encouraged utilize [facility] messaging as well.      Will follow up with pt at first clinic visit and RN navigator available for pt questions, issues or concerns.     Stressed HFTCC and after hours numbers that are on the HCG Booklet that was e-mailed to PT.

## 2024-10-14 ENCOUNTER — OFFICE VISIT (OUTPATIENT)
Dept: INTERNAL MEDICINE | Facility: CLINIC | Age: 74
End: 2024-10-14
Payer: MEDICARE

## 2024-10-14 ENCOUNTER — LAB VISIT (OUTPATIENT)
Dept: LAB | Facility: HOSPITAL | Age: 74
End: 2024-10-14
Attending: INTERNAL MEDICINE
Payer: MEDICARE

## 2024-10-14 ENCOUNTER — IMMUNIZATION (OUTPATIENT)
Dept: INTERNAL MEDICINE | Facility: CLINIC | Age: 74
End: 2024-10-14
Payer: MEDICARE

## 2024-10-14 ENCOUNTER — TELEPHONE (OUTPATIENT)
Dept: PREADMISSION TESTING | Facility: HOSPITAL | Age: 74
End: 2024-10-14
Payer: MEDICARE

## 2024-10-14 VITALS
DIASTOLIC BLOOD PRESSURE: 64 MMHG | HEART RATE: 77 BPM | OXYGEN SATURATION: 95 % | WEIGHT: 265.63 LBS | SYSTOLIC BLOOD PRESSURE: 132 MMHG | HEIGHT: 66 IN | BODY MASS INDEX: 42.69 KG/M2

## 2024-10-14 DIAGNOSIS — Z23 NEED FOR VACCINATION: Primary | ICD-10-CM

## 2024-10-14 DIAGNOSIS — Z01.818 PREOP EXAMINATION: ICD-10-CM

## 2024-10-14 DIAGNOSIS — Z01.810 PREOP CARDIOVASCULAR EXAM: Primary | ICD-10-CM

## 2024-10-14 DIAGNOSIS — Z01.810 PREOP CARDIOVASCULAR EXAM: ICD-10-CM

## 2024-10-14 LAB
APTT PPP: 27.6 SEC (ref 21–32)
BACTERIA #/AREA URNS AUTO: ABNORMAL /HPF
BILIRUB UR QL STRIP: NEGATIVE
CLARITY UR REFRACT.AUTO: CLEAR
COLOR UR AUTO: YELLOW
GLUCOSE UR QL STRIP: NEGATIVE
HGB UR QL STRIP: NEGATIVE
HYALINE CASTS UR QL AUTO: 6 /LPF
INR PPP: 0.9 (ref 0.8–1.2)
KETONES UR QL STRIP: NEGATIVE
LEUKOCYTE ESTERASE UR QL STRIP: ABNORMAL
MICROSCOPIC COMMENT: ABNORMAL
NITRITE UR QL STRIP: NEGATIVE
PH UR STRIP: 6 [PH] (ref 5–8)
PROT UR QL STRIP: NEGATIVE
PROTHROMBIN TIME: 10.4 SEC (ref 9–12.5)
RBC #/AREA URNS AUTO: 1 /HPF (ref 0–4)
SP GR UR STRIP: 1.02 (ref 1–1.03)
SQUAMOUS #/AREA URNS AUTO: 3 /HPF
URN SPEC COLLECT METH UR: ABNORMAL
WBC #/AREA URNS AUTO: 4 /HPF (ref 0–5)

## 2024-10-14 PROCEDURE — 3044F HG A1C LEVEL LT 7.0%: CPT | Mod: CPTII,S$GLB,, | Performed by: INTERNAL MEDICINE

## 2024-10-14 PROCEDURE — 36415 COLL VENOUS BLD VENIPUNCTURE: CPT | Performed by: INTERNAL MEDICINE

## 2024-10-14 PROCEDURE — 3075F SYST BP GE 130 - 139MM HG: CPT | Mod: CPTII,S$GLB,, | Performed by: INTERNAL MEDICINE

## 2024-10-14 PROCEDURE — G2211 COMPLEX E/M VISIT ADD ON: HCPCS | Mod: S$GLB,,, | Performed by: INTERNAL MEDICINE

## 2024-10-14 PROCEDURE — 99999 PR PBB SHADOW E&M-EST. PATIENT-LVL IV: CPT | Mod: PBBFAC,,, | Performed by: INTERNAL MEDICINE

## 2024-10-14 PROCEDURE — 1159F MED LIST DOCD IN RCRD: CPT | Mod: CPTII,S$GLB,, | Performed by: INTERNAL MEDICINE

## 2024-10-14 PROCEDURE — 4010F ACE/ARB THERAPY RXD/TAKEN: CPT | Mod: CPTII,S$GLB,, | Performed by: INTERNAL MEDICINE

## 2024-10-14 PROCEDURE — 3288F FALL RISK ASSESSMENT DOCD: CPT | Mod: CPTII,S$GLB,, | Performed by: INTERNAL MEDICINE

## 2024-10-14 PROCEDURE — 3078F DIAST BP <80 MM HG: CPT | Mod: CPTII,S$GLB,, | Performed by: INTERNAL MEDICINE

## 2024-10-14 PROCEDURE — 99214 OFFICE O/P EST MOD 30 MIN: CPT | Mod: S$GLB,,, | Performed by: INTERNAL MEDICINE

## 2024-10-14 PROCEDURE — 90653 IIV ADJUVANT VACCINE IM: CPT | Mod: S$GLB,,, | Performed by: INTERNAL MEDICINE

## 2024-10-14 PROCEDURE — 1160F RVW MEDS BY RX/DR IN RCRD: CPT | Mod: CPTII,S$GLB,, | Performed by: INTERNAL MEDICINE

## 2024-10-14 PROCEDURE — 85610 PROTHROMBIN TIME: CPT | Mod: GA | Performed by: INTERNAL MEDICINE

## 2024-10-14 PROCEDURE — G0008 ADMIN INFLUENZA VIRUS VAC: HCPCS | Mod: S$GLB,,, | Performed by: INTERNAL MEDICINE

## 2024-10-14 PROCEDURE — 93005 ELECTROCARDIOGRAM TRACING: CPT | Mod: S$GLB,,, | Performed by: INTERNAL MEDICINE

## 2024-10-14 PROCEDURE — 85730 THROMBOPLASTIN TIME PARTIAL: CPT | Mod: GA | Performed by: INTERNAL MEDICINE

## 2024-10-14 PROCEDURE — 81001 URINALYSIS AUTO W/SCOPE: CPT | Performed by: INTERNAL MEDICINE

## 2024-10-14 PROCEDURE — 3008F BODY MASS INDEX DOCD: CPT | Mod: CPTII,S$GLB,, | Performed by: INTERNAL MEDICINE

## 2024-10-14 PROCEDURE — 1101F PT FALLS ASSESS-DOCD LE1/YR: CPT | Mod: CPTII,S$GLB,, | Performed by: INTERNAL MEDICINE

## 2024-10-14 PROCEDURE — 93010 ELECTROCARDIOGRAM REPORT: CPT | Mod: S$GLB,,, | Performed by: INTERNAL MEDICINE

## 2024-10-14 PROCEDURE — 1126F AMNT PAIN NOTED NONE PRSNT: CPT | Mod: CPTII,S$GLB,, | Performed by: INTERNAL MEDICINE

## 2024-10-14 NOTE — PROGRESS NOTES
Subjective:       Patient ID: Ca Coats is a 73 y.o. female.    Chief Complaint: Pre-op Exam    Patient is here for followup for chronic conditions.    She has an upcoming aneurysm surgery for cerebral aneurysms.    Patient has had a recent admission for postprocedure hypoxia which cleared over time.  She had a lung biopsy prior.    No other new recent health issues.      Review of Systems   Constitutional:  Negative for activity change, appetite change and unexpected weight change.   Eyes:  Negative for visual disturbance.   Respiratory:  Negative for cough, chest tightness and shortness of breath.    Cardiovascular:  Negative for chest pain.   Gastrointestinal:  Positive for constipation. Negative for abdominal distention and abdominal pain.   Genitourinary:  Negative for difficulty urinating and urgency.        No breast lumps/masses/pain/nipple d/c in either breast     Musculoskeletal:  Positive for arthralgias and neck pain.   Skin:  Negative for rash and wound.   Hematological:  Bruises/bleeds easily.   Psychiatric/Behavioral:  Positive for dysphoric mood. Negative for confusion.            Objective:      Physical Exam  Vitals reviewed.   Constitutional:       General: She is not in acute distress.     Appearance: Normal appearance. She is well-developed. She is obese. She is not ill-appearing, toxic-appearing or diaphoretic.      Comments: In wheelchair, diff to rise to exam table.     HENT:      Head: Normocephalic and atraumatic.   Eyes:      General: No scleral icterus.     Pupils: Pupils are equal, round, and reactive to light.   Neck:      Thyroid: No thyromegaly.   Cardiovascular:      Rate and Rhythm: Normal rate and regular rhythm.      Heart sounds: Normal heart sounds. No murmur heard.     No friction rub. No gallop.   Pulmonary:      Effort: Pulmonary effort is normal. No respiratory distress.      Breath sounds: Normal breath sounds. No wheezing or rales.   Abdominal:      General:  "Bowel sounds are normal. There is no distension.      Palpations: Abdomen is soft. There is no mass.      Tenderness: There is no abdominal tenderness. There is no guarding or rebound.   Musculoskeletal:         General: Tenderness (shoulders, back) present. Normal range of motion.      Cervical back: Normal range of motion.      Right lower leg: Edema present.      Left lower leg: Edema present.   Lymphadenopathy:      Cervical: No cervical adenopathy.   Skin:     Comments: Some bruises seen on her skin especially left forearm   Neurological:      General: No focal deficit present.      Mental Status: She is alert and oriented to person, place, and time.   Psychiatric:         Speech: Speech normal.         Behavior: Behavior normal.         Thought Content: Thought content normal.         Assessment:       1. Preop cardiovascular exam    2. Preop examination        Plan:       Ca Beckwith" was seen today for pre-op exam.    Diagnoses and all orders for this visit:    Preop cardiovascular exam  -     IN OFFICE EKG 12-LEAD (to Muse)  -     APTT; Future  -     PROTIME-INR; Future  -     Urinalysis, Reflex to Urine Culture Urine, Clean Catch  Preop examination  -     IN OFFICE EKG 12-LEAD (to Muse)  -     APTT; Future  -     PROTIME-INR; Future  -     Urinalysis, Reflex to Urine Culture Urine, Clean Catch  Other orders  -     Urinalysis Microscopic  Tests from today and in the hospital were reviewed.  Pt here for preop exam.  Pt is at low to moderate risk for a moderately risky procedure, I do not see any medical contra-indications for proceeding with this surgery.  After reviewing tests pt is medically cleared.  I communicated with Eduard Simon and Tolu.      Bruising -- during the office visit I communicated with Dr. Simon who said that she does not need to be on aspirin at this time and that that is just for around the time of her aneurysm surgery.  He agree that she can stop the aspirin now especially " since she is having bruising.  Medications Discontinued During This Encounter   Medication Reason    aspirin (ECOTRIN) 325 MG EC tablet              Health Maintenance         Date Due Completion Date    COVID-19 Vaccine (10 - 2024-25 season) 12/09/2024 10/14/2024    Mammogram 07/31/2025 7/31/2024    LDCT Lung Screen 08/23/2025 8/23/2024    Lipid Panel 12/03/2025 12/3/2020    DEXA Scan 11/01/2026 11/1/2023    Colorectal Cancer Screening 11/26/2028 11/26/2018    TETANUS VACCINE 01/02/2030 1/2/2020          Follow up for Flu and covid vaccines please.    Visit today included increased complexity associated with the care of the episodic problem  addressed and managing the longitudinal care of the patient due to the serious and/or complex managed problem(s) .    Follow up for Flu and covid vaccines please.    Future Appointments   Date Time Provider Department Center   10/22/2024  8:30 AM LAB, HEMONC CANCER BLDG [facility] LAB HO Saul Canpawan   10/22/2024  9:30 AM Rossana Motley MD [facility] HEMONC2 Saul [place]p[facility]an   10/22/2024 10:30 AM Janice Orona PA-C [facility]   10/22/2024 10:30 AM [facility] HEART FAILURE NURSE [facility]   11/15/2024 10:00 AM Nurse Monet Jane RN [facility] S1 [facility]ADM [facility]   12/6/2024 10:00 AM Savanna Hyatt NP [facility] PAINMGT [facility]   12/18/2024 10:30 AM Mago Banks FNP [facility] Dario Glover PCW   1/3/2025 10:30 AM INJECTION, BAPH CHEMO BAP CHEMO [facility]   6/19/2025  9:40 AM Lei Garcia MD [facility] Dario braxton PCW

## 2024-10-15 LAB
OHS QRS DURATION: 82 MS
OHS QTC CALCULATION: 452 MS

## 2024-10-17 NOTE — PROGRESS NOTES
HF TCC Provider Note (Initial Clinic) Consult Note    Age: 73 y.o.  Gender: female  Ethnicity: Black or    Type of Congestive Heart Failure: Diastolic   Etiology: unspecified  Social history: Smoking  Enrolled in Infusion suite: no    Diagnostic Labs:   EKG - 10/15/2024  CXR - 10/03/2024  ECHO - 02/09/2022  Stress test -   Stress echo -   Pharmacologic stress -   Cardiac catheterization - 02/24/2021   Cardiac MRI -     Lab Results   Component Value Date     10/04/2024     05/31/2024    K 4.2 10/04/2024    K 4.1 05/31/2024     10/04/2024     05/31/2024    CO2 28 10/04/2024    CO2 26 05/31/2024    GLU 94 10/04/2024    GLU 95 05/31/2024    BUN 15 10/04/2024    BUN 17 05/31/2024    CREATININE 0.8 10/04/2024    CREATININE 0.9 05/31/2024    CALCIUM 9.2 10/04/2024    CALCIUM 9.5 05/31/2024    PROT 6.5 05/27/2024    PROT 6.4 01/18/2024    ALBUMIN 3.1 (L) 05/27/2024    ALBUMIN 3.1 (L) 01/18/2024    BILITOT 0.4 05/27/2024    BILITOT 0.6 01/18/2024    ALKPHOS 83 05/27/2024    ALKPHOS 93 01/18/2024    AST 16 05/27/2024    AST 13 01/18/2024    ALT 11 05/27/2024    ALT 10 01/18/2024    ANIONGAP 6 (L) 10/04/2024    ANIONGAP 8 05/31/2024    ESTGFRAFRICA >60 06/23/2022    ESTGFRAFRICA >60 04/25/2022    EGFRNONAA >60 06/23/2022    EGFRNONAA >60 04/25/2022       Lab Results   Component Value Date    WBC 10.30 09/10/2024    WBC 10.08 05/27/2024    RBC 3.56 (L) 09/10/2024    RBC 3.49 (L) 05/27/2024    HGB 11.2 (L) 09/10/2024    HGB 11.3 (L) 05/27/2024    HCT 38 10/03/2024    HCT 36.2 (L) 09/10/2024     (H) 09/10/2024     (H) 05/27/2024    MCH 31.5 (H) 09/10/2024    MCH 32.4 (H) 05/27/2024    MCHC 30.9 (L) 09/10/2024    MCHC 31.7 (L) 05/27/2024    RDW 12.5 09/10/2024    RDW 12.9 05/27/2024     09/10/2024     05/27/2024    MPV 10.3 09/10/2024    MPV 10.2 05/27/2024    IMMGR 0.3 09/10/2024    IMMGR 0.3 05/27/2024    IGABS 0.03 09/10/2024    IGABS 0.03 05/27/2024    LYMPH  2.2 09/10/2024    LYMPH 21.1 09/10/2024    MONO 0.9 09/10/2024    MONO 9.0 09/10/2024    EOS 0.1 09/10/2024    EOS 0.1 05/27/2024    BASO 0.04 09/10/2024    BASO 0.03 05/27/2024    NRBC 0 09/10/2024    NRBC 0 05/27/2024    GRAN 7.0 09/10/2024    GRAN 68.3 09/10/2024    EOSINOPHIL 0.9 09/10/2024    EOSINOPHIL 1.3 05/27/2024    BASOPHIL 0.4 09/10/2024    BASOPHIL 0.3 05/27/2024    PLTEST Appears normal 10/19/2021    PLTEST Appears normal 10/16/2021    ANISO Slight 10/19/2021    ANISO Slight 10/16/2021    HYPO Occasional 10/19/2021       Lab Results   Component Value Date     (H) 10/04/2024    BNP 27 02/14/2022    MG 2.1 10/04/2024    MG 2.2 10/04/2024    PHOS 4.0 10/26/2021    PHOS 2.3 (L) 10/19/2021    HGBA1C 4.4 01/18/2024    HGBA1C 4.4 06/30/2021    TSH 0.280 (L) 01/18/2024    TSH 0.442 10/26/2021    FREET4 0.99 01/18/2024    FREET4 0.93 07/19/2019    Z8BFTUE 5.5 05/13/2019       Lab Results   Component Value Date    IRON 94 04/05/2023    TIBC 263 04/05/2023    FERRITIN 354 (H) 10/27/2023    CHOL 86 (L) 12/03/2020    TRIG 80 12/03/2020    HDL 39 (L) 12/03/2020    LDLCALC 31.0 (L) 12/03/2020    CHOLHDL 45.3 12/03/2020    TOTALCHOLEST 2.2 12/03/2020    NONHDLCHOL 47 12/03/2020    COLORU Yellow 10/14/2024    APPEARANCEUA Clear 10/14/2024    PHUR 6.0 10/14/2024    SPECGRAV 1.025 10/14/2024    PROTEINUA Negative 10/14/2024    GLUCUA Negative 10/14/2024    KETONESU Negative 10/14/2024    BILIRUBINUA Negative 10/14/2024    OCCULTUA Negative 10/14/2024    NITRITE Negative 10/14/2024    LEUKOCYTESUR 1+ (A) 10/14/2024       No implanted cardiac devices    Current Outpatient Medications on File Prior to Visit   Medication Sig Dispense Refill    acetaminophen (TYLENOL) 500 MG tablet Take by mouth daily as needed.      amLODIPine (NORVASC) 10 MG tablet Take 1 tablet (10 mg total) by mouth once daily. (Patient taking differently: Take 10 mg by mouth every morning.) 90 tablet 11    atorvastatin (LIPITOR) 20 MG tablet Take  1 tablet (20 mg total) by mouth once daily. 90 tablet 11    B-complex with vitamin C (Z-BEC OR EQUIV) tablet Take 1 tablet by mouth once daily.       CALCIUM CITRATE-VITAMIN D2 ORAL Take 1 tablet by mouth once daily.       citalopram (CELEXA) 10 MG tablet Take 1 tablet (10 mg total) by mouth once daily. 90 tablet 11    ferrous gluconate (FERGON) 324 MG tablet TAKE 1 TABLET (324 MG TOTAL) BY MOUTH DAILY WITH BREAKFAST. 90 tablet 3    furosemide (LASIX) 80 MG tablet Take 1 tablet (80 mg total) by mouth 2 (two) times daily. 180 tablet 11    gabapentin (NEURONTIN) 300 MG capsule Take 2 capsules (600 mg total) by mouth 3 (three) times daily. 540 capsule 2    lisinopriL (PRINIVIL,ZESTRIL) 40 MG tablet Take 1 tablet (40 mg total) by mouth once daily. (Patient taking differently: Take 40 mg by mouth every evening.) 90 tablet 11    multivitamin with minerals tablet Take 1 tablet by mouth once daily.       oxyCODONE-acetaminophen (PERCOCET) 5-325 mg per tablet Take 1 tablet by mouth every 8 (eight) hours as needed for Pain. 90 tablet 0    pantoprazole (PROTONIX) 40 MG tablet Take 1 tablet (40 mg total) by mouth once daily. 90 tablet 11    semaglutide (OZEMPIC) 1 mg/dose (4 mg/3 mL) Inject 1 mg into the skin every 7 days. (Patient taking differently: Inject 1 mg into the skin every 7 days. SATURDAYS) 9 each 11     Current Facility-Administered Medications on File Prior to Visit   Medication Dose Route Frequency Provider Last Rate Last Admin    0.9%  NaCl infusion   Intravenous Continuous Tevin Clark MD 0 mL/hr at 01/13/22 1055 New Bag at 03/05/24 1000    0.9%  NaCl infusion   Intravenous Continuous Kevin Carballo MD        sodium chloride 0.9% flush 10 mL  10 mL Intravenous PRN Kevin Carballo MD             HPI:  Patient reports can walk a few steps before SOB and pace slow. Presents to clinic in wheelchair    Patient sleeps on 1 number of pillows with CPAP   Patient wakes up SOB, has to get out of bed,  associated cough- denies   Palpitations - occasional   Dizzy, light-headed, pre-syncope or syncope- intermittent dizziness/lightheadedness, denies pre-syncope/syncope   Since discharge frequency of performing weights, home weight and weight change- performing daily weights, weight stable 264lbs   Other information felt pertinent to HPI: Ms. Ca Coats is a 74 yo female with a PMHx of gastric adenocarcinoma, smoking history with suspected COPD, multiple pulmonary nodules, HTN, HLD, morbid obesity, LAURA, chronic pain on opioids, history of DVT, intracranial aneurysms who presents to [facility] visit following recent admission for hypoxia s/p lung nodule biopsy. She does admit to chronic exertional dyspnea, which is at baseline. Also endorses chronic LE edema, L>R, which improves with elevation. On further history, she is no longer taking Eliquis as she completed her 6 months of therapy around 2 months ago. She is not on home oxygen and reports that she wears her CPAP at night. Smokes 3-4 cigarettes per day. BNP mildly elevated and given lasix IV x1. LE dopplers negative. Likely has underlying COPD as interstitial disease noted on CTs from at least 2021 vs post-extubation hypoxia. Patient weaned to room air and subsequently discharged.      PHYSICAL:   Vitals:    10/22/24 1032   BP: (!) 121/56   Pulse: 68      @[id number](3)@    JVD: no  Heart rhythm: regular  Cardiac murmur: No    S3: no  S4: no  Lungs: clear, diminished breath sounds in posterior lung bases  Hepatojugular reflux: no  Edema: trace-1+ edema in ankles/feet    Echo 2/9/22:  The left ventricle is normal in size with normal systolic function. The estimated ejection fraction is 65%.  Grade II left ventricular diastolic dysfunction.  Normal right ventricular size with normal right ventricular systolic function.  Biatrial enlargement.  Mild tricuspid regurgitation.  The estimated PA systolic pressure is 41 mmHg.  There is pulmonary hypertension.  Normal  central venous pressure (3 mmHg).  There is mobile annular calcifications attached to the nikhil-lateral part of annulus of the valve ( image 46). Clinical correlation is required.    ASSESSMENT: HFpEF    PLAN:      Patient Instructions:   Instruct the patient to notify this clinic if HH, a physician or an advanced care provider wants to change medication one of their HF medications   Activity and Diet restrictions:   Recommend 2-3 gram sodium restriction and 1500cc- 2000cc fluid restriction.  Encourage physical activity with graded exercise program.  Requested patient to weigh themselves daily, and to notify us if their weight increases by more than 3 lbs in 1 day or 5 lbs in 3 days.    Assigned dry weight on home scale: 264lbs  Medication changes (include current dose and changed dose): NYHA Class III symptoms. Appears well compensated on exam from fluid standpoint. BNP now normalized (though is morbidly obese). Continue lasix 80mg BID. Get updated TTE.  Upcoming labs and date anticipated: pending TTE plan for weekly phone check ins with RTC angela Orona PA-C

## 2024-10-21 ENCOUNTER — PATIENT OUTREACH (OUTPATIENT)
Dept: ADMINISTRATIVE | Facility: CLINIC | Age: 74
End: 2024-10-21
Payer: MEDICARE

## 2024-10-21 NOTE — PROGRESS NOTES
C3 nurse spoke with Ca Coats  for a TCC post hospital discharge follow up call. The patient had a scheduled HOSFU appointment with Lei Garcia MD  on 10/14/24.

## 2024-10-22 ENCOUNTER — PATIENT MESSAGE (OUTPATIENT)
Dept: HEMATOLOGY/ONCOLOGY | Facility: CLINIC | Age: 74
End: 2024-10-22

## 2024-10-22 ENCOUNTER — OFFICE VISIT (OUTPATIENT)
Dept: CARDIOLOGY | Facility: CLINIC | Age: 74
End: 2024-10-22
Payer: MEDICARE

## 2024-10-22 ENCOUNTER — HOSPITAL ENCOUNTER (OUTPATIENT)
Dept: CARDIOLOGY | Facility: HOSPITAL | Age: 74
Discharge: HOME | End: 2024-10-22
Payer: MEDICARE

## 2024-10-22 ENCOUNTER — OFFICE VISIT (OUTPATIENT)
Dept: HEMATOLOGY/ONCOLOGY | Facility: CLINIC | Age: 74
End: 2024-10-22
Payer: MEDICARE

## 2024-10-22 ENCOUNTER — DOCUMENTATION ONLY (OUTPATIENT)
Dept: CARDIOLOGY | Facility: CLINIC | Age: 74
End: 2024-10-22

## 2024-10-22 VITALS
WEIGHT: 267.88 LBS | OXYGEN SATURATION: 97 % | HEART RATE: 68 BPM | HEIGHT: 66 IN | DIASTOLIC BLOOD PRESSURE: 56 MMHG | BODY MASS INDEX: 43.05 KG/M2 | SYSTOLIC BLOOD PRESSURE: 121 MMHG

## 2024-10-22 VITALS
HEIGHT: 66 IN | BODY MASS INDEX: 43.01 KG/M2 | SYSTOLIC BLOOD PRESSURE: 128 MMHG | WEIGHT: 267.63 LBS | DIASTOLIC BLOOD PRESSURE: 76 MMHG | HEART RATE: 68 BPM

## 2024-10-22 VITALS
HEART RATE: 70 BPM | DIASTOLIC BLOOD PRESSURE: 60 MMHG | HEIGHT: 66 IN | TEMPERATURE: 98 F | OXYGEN SATURATION: 98 % | WEIGHT: 266.88 LBS | SYSTOLIC BLOOD PRESSURE: 133 MMHG | BODY MASS INDEX: 42.89 KG/M2 | RESPIRATION RATE: 14 BRPM

## 2024-10-22 DIAGNOSIS — I10 ESSENTIAL HYPERTENSION: Chronic | ICD-10-CM

## 2024-10-22 DIAGNOSIS — E78.00 PURE HYPERCHOLESTEROLEMIA: Chronic | ICD-10-CM

## 2024-10-22 DIAGNOSIS — I10 ESSENTIAL HYPERTENSION: ICD-10-CM

## 2024-10-22 DIAGNOSIS — C16.9 GASTRIC ADENOCARCINOMA: Primary | ICD-10-CM

## 2024-10-22 DIAGNOSIS — R91.8 PULMONARY NODULES/LESIONS, MULTIPLE: Chronic | ICD-10-CM

## 2024-10-22 DIAGNOSIS — I51.89 DIASTOLIC DYSFUNCTION: ICD-10-CM

## 2024-10-22 DIAGNOSIS — G47.33 OBSTRUCTIVE SLEEP APNEA: Chronic | ICD-10-CM

## 2024-10-22 DIAGNOSIS — Z72.0 TOBACCO ABUSE: Chronic | ICD-10-CM

## 2024-10-22 DIAGNOSIS — I51.89 DIASTOLIC DYSFUNCTION: Primary | ICD-10-CM

## 2024-10-22 DIAGNOSIS — I67.1 CEREBRAL ANEURYSM: ICD-10-CM

## 2024-10-22 DIAGNOSIS — E66.01 MORBID OBESITY WITH BMI OF 45.0-49.9, ADULT: Chronic | ICD-10-CM

## 2024-10-22 DIAGNOSIS — D64.9 MILD CHRONIC ANEMIA: ICD-10-CM

## 2024-10-22 DIAGNOSIS — Z74.09 IMPAIRED FUNCTIONAL MOBILITY, BALANCE, GAIT, AND ENDURANCE: Chronic | ICD-10-CM

## 2024-10-22 DIAGNOSIS — R91.8 PULMONARY NODULES: ICD-10-CM

## 2024-10-22 PROBLEM — J98.4 LUNG DISEASE: Chronic | Status: RESOLVED | Noted: 2024-10-03 | Resolved: 2024-10-22

## 2024-10-22 LAB
ASCENDING AORTA: 2.99 CM
AV AREA BY CONTINUOUS VTI: 1.8 CM2
AV INDEX (PROSTH): 0.49
AV LVOT MEAN GRADIENT: 2 MMHG
AV LVOT PEAK GRADIENT: 4 MMHG
AV MEAN GRADIENT: 7.5 MMHG
AV PEAK GRADIENT: 14.4 MMHG
AV VALVE AREA BY VELOCITY RATIO: 2 CM²
AV VALVE AREA: 1.9 CM2
AV VELOCITY RATIO: 0.53
BSA FOR ECHO PROCEDURE: 2.38 M2
DOP CALC AO PEAK VEL: 1.9 M/S
DOP CALC AO VTI: 47.1 CM
DOP CALC LVOT AREA: 3.8 CM2
DOP CALC LVOT DIAMETER: 2.2 CM
DOP CALC LVOT PEAK VEL: 1 M/S
DOP CALC LVOT STROKE VOLUME: 87.4 CM3
DOP CALCLVOT PEAK VEL VTI: 23 CM
E WAVE DECELERATION TIME: 242.7 MS
E/A RATIO: 0.77
E/E' RATIO: 11.25 M/S
ECHO EF ESTIMATED: 53 %
ECHO EF ESTIMATED: 60 %
ECHO LV POSTERIOR WALL: 0.8 CM (ref 0.6–1.1)
ECHO LV POSTERIOR WALL: 0.9 CM (ref 0.6–1.1)
INTERVENTRICULAR SEPTUM: 0.8 CM (ref 0.6–1.1)
INTERVENTRICULAR SEPTUM: 0.8 CM (ref 0.6–1.1)
IVRT: 88.49 MS
LA MAJOR: 6.33 CM
LA MINOR: 6.19 CM
LA WIDTH: 4.81 CM
LEFT ATRIUM SIZE: 4.31 CM
LEFT ATRIUM VOLUME INDEX MOD: 60.4 ML/M2
LEFT ATRIUM VOLUME INDEX: 48.8 ML/M2
LEFT ATRIUM VOLUME MOD: 136.58 ML
LEFT ATRIUM VOLUME: 110.3 CM3
LEFT INTERNAL DIMENSION IN SYSTOLE: 3.7 CM (ref 2.1–4)
LEFT INTERNAL DIMENSION IN SYSTOLE: 3.8 CM (ref 2.1–4)
LEFT VENTRICLE DIASTOLIC VOLUME: 128.52 ML
LEFT VENTRICLE DIASTOLIC VOLUME: 147.42 ML
LEFT VENTRICLE SYSTOLIC VOLUME: 59.58 ML
LEFT VENTRICLE SYSTOLIC VOLUME: 60.83 ML
LEFT VENTRICULAR INTERNAL DIMENSION IN DIASTOLE: 5.2 CM (ref 3.5–6)
LEFT VENTRICULAR INTERNAL DIMENSION IN DIASTOLE: 5.5 CM (ref 3.5–6)
LV LATERAL E/E' RATIO: 10
LV SEPTAL E/E' RATIO: 12.86
MV A" WAVE DURATION": 110.37 MS
MV PEAK A VEL: 1.17 M/S
MV PEAK E VEL: 0.9 M/S
PISA TR MAX VEL: 2.91 M/S
PULM VEIN A" WAVE DURATION": 110.37 MS
PULM VEIN S/D RATIO: 1.42
PULMONIC VEIN PEAK A VELOCITY: 0.3 M/S
PV PEAK D VEL: 0.31 M/S
PV PEAK S VEL: 0.44 M/S
RA MAJOR: 5.22 CM
RA PRESSURE ESTIMATED: 3 MMHG
RA WIDTH: 3.63 CM
RIGHT VENTRICLE DIASTOLIC BASEL DIMENSION: 4.3 CM
RV TB RVSP: 6 MMHG
RV TISSUE DOPPLER FREE WALL SYSTOLIC VELOCITY 1 (APICAL 4 CHAMBER VIEW): 10.35 CM/S
SINUS: 2.97 CM
STJ: 2.51 CM
TDI LATERAL: 0.09 M/S
TDI SEPTAL: 0.07 M/S
TDI: 0.08 M/S
TR MAX PG: 34 MMHG
TRICUSPID ANNULAR PLANE SYSTOLIC EXCURSION: 1.91 CM
TV PEAK GRADIENT: 34 MMHG
TV REST PULMONARY ARTERY PRESSURE: 37 MMHG

## 2024-10-22 PROCEDURE — 3044F HG A1C LEVEL LT 7.0%: CPT | Mod: CPTII,S$GLB,, | Performed by: STUDENT IN AN ORGANIZED HEALTH CARE EDUCATION/TRAINING PROGRAM

## 2024-10-22 PROCEDURE — 99999 PR PBB SHADOW E&M-EST. PATIENT-LVL IV: CPT | Mod: PBBFAC,,, | Performed by: STUDENT IN AN ORGANIZED HEALTH CARE EDUCATION/TRAINING PROGRAM

## 2024-10-22 PROCEDURE — 93306 TTE W/DOPPLER COMPLETE: CPT | Mod: 26,,, | Performed by: INTERNAL MEDICINE

## 2024-10-22 PROCEDURE — 4010F ACE/ARB THERAPY RXD/TAKEN: CPT | Mod: CPTII,S$GLB,, | Performed by: STUDENT IN AN ORGANIZED HEALTH CARE EDUCATION/TRAINING PROGRAM

## 2024-10-22 PROCEDURE — 1101F PT FALLS ASSESS-DOCD LE1/YR: CPT | Mod: CPTII,S$GLB,, | Performed by: STUDENT IN AN ORGANIZED HEALTH CARE EDUCATION/TRAINING PROGRAM

## 2024-10-22 PROCEDURE — G2211 COMPLEX E/M VISIT ADD ON: HCPCS | Mod: S$GLB,,, | Performed by: STUDENT IN AN ORGANIZED HEALTH CARE EDUCATION/TRAINING PROGRAM

## 2024-10-22 PROCEDURE — 93306 TTE W/DOPPLER COMPLETE: CPT

## 2024-10-22 PROCEDURE — 3288F FALL RISK ASSESSMENT DOCD: CPT | Mod: CPTII,S$GLB,, | Performed by: STUDENT IN AN ORGANIZED HEALTH CARE EDUCATION/TRAINING PROGRAM

## 2024-10-22 PROCEDURE — 3078F DIAST BP <80 MM HG: CPT | Mod: CPTII,S$GLB,, | Performed by: STUDENT IN AN ORGANIZED HEALTH CARE EDUCATION/TRAINING PROGRAM

## 2024-10-22 PROCEDURE — 1159F MED LIST DOCD IN RCRD: CPT | Mod: CPTII,S$GLB,, | Performed by: STUDENT IN AN ORGANIZED HEALTH CARE EDUCATION/TRAINING PROGRAM

## 2024-10-22 PROCEDURE — 1126F AMNT PAIN NOTED NONE PRSNT: CPT | Mod: CPTII,S$GLB,, | Performed by: STUDENT IN AN ORGANIZED HEALTH CARE EDUCATION/TRAINING PROGRAM

## 2024-10-22 PROCEDURE — 3008F BODY MASS INDEX DOCD: CPT | Mod: CPTII,S$GLB,, | Performed by: STUDENT IN AN ORGANIZED HEALTH CARE EDUCATION/TRAINING PROGRAM

## 2024-10-22 PROCEDURE — 99215 OFFICE O/P EST HI 40 MIN: CPT | Mod: S$GLB,,, | Performed by: STUDENT IN AN ORGANIZED HEALTH CARE EDUCATION/TRAINING PROGRAM

## 2024-10-22 PROCEDURE — 99999 PR PBB SHADOW E&M-EST. PATIENT-LVL V: CPT | Mod: PBBFAC,,,

## 2024-10-22 PROCEDURE — 3075F SYST BP GE 130 - 139MM HG: CPT | Mod: CPTII,S$GLB,, | Performed by: STUDENT IN AN ORGANIZED HEALTH CARE EDUCATION/TRAINING PROGRAM

## 2024-10-22 NOTE — PROGRESS NOTES
"[facility]) First Week Visit     Pt presents to clinic  10-22-24 and accompanied by  Miss Phan one of her Caregivers.     Most Recent Hospital Discharge Date:  10-4-24  Last admission Diagnosis/chief complaint: SOB        Visit Vitals:     Wt Readings from Last 3 Encounters:   10/22/24 121.4 kg (267 lb 9.6 oz)   10/22/24 121.5 kg (267 lb 13.7 oz)   10/22/24 121.1 kg (266 lb 13.9 oz)     Temp Readings from Last 3 Encounters:   10/22/24 98 °F (36.7 °C) (Oral)   10/04/24 98.2 °F (36.8 °C)   09/06/24 98.1 °F (36.7 °C)     BP Readings from Last 3 Encounters:   10/22/24 128/76   10/22/24 (!) 121/56   10/22/24 133/60     Pulse Readings from Last 3 Encounters:   10/22/24 68   10/22/24 68   10/22/24 70            Pt reports the following:  []  Shortness of Breath with activity  []  Shortness of Breath at rest/ sleeping on 2-3 pillows "some days"  []  Fatigue  []  Edema   [] Chest pain or tightness  [] Weight Increase since discharge  [x] None of the above    Pt reports being in the GREEN(color) Zone. If in yellow/red, reminded that they should be calling [facility] today or now.      Medications:     Pt reports having all medications available and understands how to take them appropriately. Reminded pt to call prior to making any changes to medications.      Education:    [x] Gave pt new  / Confirmed pt has  "[facility] Home Care Guide" .   Reviewed key points as listed below.      Recommend 2 gram sodium restriction and 1500cc fluid restriction.  Encourage physical activity with graded exercise program.  Requested patient to weigh themselves daily, and to notify us if their weight increases by more than 3 lbs in 1 day or 5 lbs in 3 days.      [x] Gave / Reviewed "Daily Weight and Symptom Tracker".  [facility] with patient [facility] and [facility] according to "Yellow Zone" and "Red Zone".                  [x] Pt given list of low/high sodium food list. Your Heart " "Healthy Nutrition booklet given.                   Watch for these Signs and Symptoms: If any of these occur, contact [facility] immediately:   Increase in shortness of breath with movement   Increase in swelling in your legs and ankles   Weight gain of more than 3 pounds in a night or 5 pounds in 3 days.   Difficulty breathing when you are lying down   Worsening fatigue or tiredness   Stomach bloating, a full feeling or a loss of appetite   Increased coughing--especially when you are lying down     MyChart and Care Companion:              Patient active on myChart? Yes, patient uses regularly.    Contacting our Team:              Reviewed with pt how to contact [facility] RN via phone or [employer] messaging.      [facility] Program Plan:  Pt educated on follow-up plan while in [facility] program.   [x]  PT given /reviewed upcoming appointment/check in dates. These will include weekly contact with RN or visits with providers over the next 4-6 weeks.                   [x]  Pt educated that they will transitioned to long term care provider team at week 4-6.  This team will be either Cardiology, PCP or Advanced Heart Failure depending on needs.          Pt was able to verbalize back to RN in their own words correct diet/fluid restrictions, necessity for exercise, warning signs and symptoms, when and how to contact their TCC team .       Plan:   See PA-C Note        [x]  Pt given AVS with  medication detail list for ease of use.     [x]  Explained to pt they will have a phone "check in" by RN in/on 10-29-24          Will follow up with pt at next clinic visit and RN navigator available for pt questions, issues or concerns.     Please refer to provider visit for additional details and assessment.    Stressed the use of the [facility] and after hours phone #s.    "

## 2024-10-22 NOTE — PATIENT INSTRUCTIONS
Continue lasix 80mg twice daily.    Will get updated Echo.    Notify us ([phone]) with 3lb weight gain in 1 day/5lb weight gain in 1 week or worsening shortness of breath/swelling.

## 2024-10-22 NOTE — PROGRESS NOTES
[facility]     Reason for visit: follow up     Best Contact Phone Number(s): [phone] (home)      Cancer/Stage/TNM:    Cancer Staging   Gastric adenocarcinoma  Staging form: Stomach, AJCC 8th Edition  - Pathologic stage from 5/25/2021: Stage Unknown (pT1, pNX, cM0) - Signed by Sonu Darden MD on 2/2/2022       Oncology History   Gastric adenocarcinoma   2/5/2021 Procedure    EGD  Feb 2021 EGD- Gastric tumor in the prepyloric region of the stomach,     3/17/2021 Procedure    EUS  Impression:           - Gastric tumor in the prepyloric region of the                         stomach. Biopsied. Lesion appears amenable to                         endoscopic resection (ESD) - Dr. Carballo was present                         to view the lesion.      5/25/2021 Initial Diagnosis    Gastric adenocarcinoma     5/25/2021 Pathology Significant Finding    Adenocarcinoma, moderate to poorly differentiated   Tumor invades deep layers of submuocsa with deep margin extensively positive   Deep margin is extensively positive   Lateral margins are negative for neoplasia or malignancy      5/25/2021 Cancer Staged    Staging form: Stomach, AJCC 8th Edition  - Pathologic stage from 5/25/2021: Stage Unknown (pT1, pNX, cM0)     6/18/2021 Imaging Significant Findings    CT CAP   Asymmetric gastric wall thickening at the level of the gastric antrum presumably representing the patient's gastric adenocarcinoma.  I see no CT evidence for metastatic disease.     Low-density focus lower pole left kidney does not meet criteria for a simple cyst.  Findings can be further evaluated with ultrasound.  Additional subcentimeter low-density foci in the right kidney likely too small to characterize by imaging.     6/28/2021 Tumor Conference       [facility]   UGI MULTIDISCIPLINARY TUMOR BOARD  PATIENT REVIEW FORM   ____________________________________________________________    CLINIC #: [id number]  DATE:  6/28/2021    DIAGNOSIS: gastric CA    PRESENTER: Latanya/ Donnie Sanders    PATIENT SUMMARY:   This 69 y/o female had incidental finding of gastric CA on EGD as part of bariatric evaluation given BMI 57. Underwent ESD per Dr. Carballo. Reviewed pathology - pT1b with LVI positive, extensive tumor at deep margin.     BOARD RECOMMENDATIONS:   Recommend perioperative chemotherapy, 4 cycles of FLOT    CONSULT NEEDED:     [] Surgery    [x] Hem/Onc    [] Rad/Onc    [] Dietary                 [] Social Service    [] Psychology       [] AES  [] Radiology     ESD Pathology Stage: Tumor 1b  Node(s) 0 Metastasis 0      GROUP STAGE:  [] O    [] 1A    [] IB    [] IIA    [] IIB     [] IIIA     [] IIIB     [] IIIC    []IV  [] Local recurrence     [] Regional recurrence     [] Distant recurrence   Metastatic site(s): none         [x] Jennifer'l Treatment Guidelines reviewed and care planned is consistent with guidelines.         (i.e., NCCN, NCI, PD, ACO, AUA, etc.)    PRESENTATION AT CANCER CONFERENCE:         [x] Prospective    [] Retrospective     [] Follow-Up       7/21/2021 - 9/30/2021 Chemotherapy    Treatment Summary   Plan Name: OP GASTRIC FLOT - FLUOROURACIL LEUCOVORIN OXALIPLATIN DOCETAXEL Q2W  Treatment Goal: Curative  Status: Inactive  Start Date: 7/21/2021  End Date: 9/30/2021  Provider: Cathy Pelaez MD  Chemotherapy: DOCEtaxeL (TAXOTERE) 50 mg/m2 = 135 mg in sodium chloride 0.9% 250 mL chemo infusion, 50 mg/m2 = 135 mg, Intravenous, Clinic/HOD 1 time, 4 of 4 cycles  Dose modification: 40 mg/m2 (original dose 50 mg/m2, Cycle 3)  Administration: 135 mg (7/21/2021), 135 mg (8/12/2021), 105 mg (9/15/2021), 105 mg (9/29/2021)  leucovorin calcium 200 mg/m2 = 545 mg in dextrose 5 % 250 mL infusion, 200 mg/m2 = 545 mg, Intravenous, Clinic/HOD 1 time, 4 of 4 cycles  Administration: 545 mg (7/21/2021), 535 mg (8/12/2021), 530 mg (9/15/2021), 525 mg (9/29/2021)  oxaliplatin (ELOXATIN) 85 mg/m2 = 232 mg in dextrose 5 % 500 mL chemo  infusion, 85 mg/m2 = 232 mg, Intravenous, [facility] 1 time, 4 of 4 cycles  Administration: 232 mg (7/21/2021), 228 mg (8/12/2021), 225 mg (9/15/2021), 223 mg (9/29/2021)  fluorouraciL 7,000 mg in sodium chloride 0.9% 240 mL chemo infusion, 7,100 mg, Intravenous, Over 24 hours, 4 of 11 cycles  Dose modification: 2,000 mg/m2 (original dose 2,600 mg/m2, Cycle 3), 225 mg/m2/day (original dose 2,600 mg/m2, Cycle 5)  Administration: 7,000 mg (7/21/2021), 6,970 mg (8/12/2021), 5,300 mg (9/15/2021), 5,000 mg (9/29/2021)     9/16/2021 Imaging Significant Findings    Thrombosis of the right popliteal, posterior tibial, and peroneal veins.     11/15/2021 Imaging Significant Findings    CT CAP Overall, no detrimental change compared to previous.  Persistent eccentric wall thickening at the level of the antrum in this patient with provided history of gastric adenocarcinoma.  No metastatic disease.     Subcentimeter pulmonary nodules unchanged.  No new nodules.     Cholelithiasis     1/13/2022 Procedure    EUS  Impression:            - Normal esophagus.                          - Scar in the gastric antrum. Biopsied.                          - Congested, nodular and ulcerated mucosa in the                          antrum. Biopsied. Treated with argon plasma                          coagulation (APC).                          - Acquired deformity in the prepyloric region of                          the stomach.                          - Normal examined duodenum.                          - There was abnormal echogenicity in the                          visualized portion of the liver. This was                          hyperechoic and homogenous. This can be seen with                          fatty liver.                          - Hyperechoic material consistent with sludge was                          visualized endosonographically in the gallbladder.                          - There was dilation in the common bile duct which                           measured up to 10.8 mm.                          - Wall thickening was seen in the antrum of the                          stomach. This probable related to previous ESD.      2/14/2022 - 3/30/2022 Chemotherapy    Treatment Summary   Plan Name: OP FLUOROURACIL (5 DAY) QW + XRT  Treatment Goal: Curative  Status: Inactive  Start Date: 2/14/2022  End Date: 3/30/2022  Provider: Cathy Pelaez MD  Chemotherapy: [No matching medication found in this treatment plan]     2/14/2022 - 3/29/2022 Radiation Therapy    Treating physician: Dr. Sonu Darden  Total Dose: 50.4 Gy  Fractions: 28    Course: C1 Abdomen 2022    Treatment Site Ref. ID Energy Dose/Fx (Gy) #Fx Dose Correction (Gy) Total Dose (Gy) Start Date End Date Elapsed Days   IM Stomach PTV_Low 6X 1.8 25 / 25 0 45 2/14/2022 3/24/2022 38   IM StomachBst PTVhigh 6X 1.8 3 / 3 0 5.4 3/25/2022 3/29/2022 4     F Isabel Coats is a 71 y.o. woman with at least [id number] adenocarcinoma of gastric antrum (posterior wall), intestinal type, moderate to poorly differentiated, invading deep layers of submuocsa with deep SM extensively positive, lateral margins negative, LVI focal positive, PNI present, no nodes examined, s/p 4 cycles of FLOT per [facility], but after re-assessment, no longer surgical candidate due to comorbidities.  PMH of  LAURA, HTN, HLD, Obesity, smoker, Mili CHF    She is s/p concurrent 5FU-CI and RT 45Gy/25fx to the entire stomach+elective nodes with 5.4Gy/3fx boost to the prepylotic region completed 3/29/2022.              7/19/2022 Imaging Significant Findings    PET scan     In this patient with gastric cancer, there is no definite evidence of hypermetabolic tumor.     Nonspecific mildly hypermetabolic and diffuse cutaneous thickening of the lower anterior abdominal wall.  Recommend correlation with history and physical examination.     10/11/2022 Procedure    EUS   - Scar in the gastric antrum from prior ESD at                           that site with congested/erythematous/nodular                          adjacent mucosa (possibly granulation tissue).                          Biopsied extensively with cold forceps.                          - A medium amount of food (residue) in the stomach.                          - Wall thickening was seen in the antrum of the                          stomach and in the prepyloric region of the                          stomach approximately 9-11mm endosonographically;                          likely related to prior ESD.                          - There was no sign of significant pathology in                          the pancreatic head, pancreatic body, pancreatic                          tail, uncinate process of the pancreas and                          pancreatic neck.                          - There was no sign of significant pathology in                          the common bile duct.                          - Hyperechoic material consistent with sludge was                          again visualized endosonographically in the                          gallbladder.                          - There was mild echogenicity diffusely in the                          liver suggestive of fatty liver in the visualized                          portions of the liver.                          - Region of celiac artery takeoff was unremarkable.                          - No obvious lymphadenopathy.      10/11/2022 Pathology Significant Finding    STOMACH, BIOPSY:   Gastric body and antral mucosa with mild chronic gastritis and reactive   changes   No evidence of intestinal metaplasia, dysplasia or malignancy   No evidence of Helicobacter pylori organisms on H&E stain      11/16/2023 Imaging Significant Findings    Stable concentric thickening of the stomach no change from the prior study.  This is consistent with a history gastric carcinoma.  There is no evidence of metastatic disease.      5/27/2024 Cancer Staged    CT  CAP shows similar concentric stomach thickening, no new wall thickening of gastric mass. New and more conspicuous scattered pulmonary nodules largest in CRUZ 1.0 cm and largest right lung 0.7 cm. Subpleural interlobular septal thickening 2/2 interstitial lung disease     8/23/2024 Cancer Staged    CT Chest shows stable pulmonary nodules in both lungs, largest measuring 9 mm. No new nodules     10/3/2024 Biopsy    IR biopsy of left lung lesions shows sparse groups of carcinoma, positive for CK7, focally positive for p40, no definitive TTF or CDX2 in limited sample         HPI: Ca Coats is a 73 y.o. female with history of gastric cancer who presents for follow up. Since last visit patient has undergone lung imaging and IR lung biopsy of peripheral lung lesion showing carcinoma. Has upcoming aneurysm surgery 11/20/24. Patient denies nausea, vomiting. Has good appetite. Notes dyspnea on exertion but not rest, no cough. No fevers. Weight has been stable, up 2 lbs. Remains active at home cooking and ADLs. Has significant headache 2/2 aneurysm.     Patient presents with Emilie alvarado.  ECOG PS is 1.  History has been obtained by chart review and discussion with the patient.    ROS:   A complete 12-point review of systems was reviewed and is negative except as mentioned above.     Past Medical History:   Past Medical History:   Diagnosis Date    YOUNG (acute kidney injury) 10/15/2021    Anemia     2018    Arthritis     BMI 60.0-69.9, adult     Cataract     Chronic diastolic congestive heart failure 01/27/2021    Deep vein thrombosis     Depression     Dry eye syndrome     DVT of deep femoral vein, right 05/29/2023    Gastric adenocarcinoma 05/25/2021    GERD (gastroesophageal reflux disease)     Glaucoma suspect     History of gastric ulcer     History of psychiatric hospitalization     Hx of psychiatric care     Celexa, no recall of charted Effexor, Lexapro, Desyrel prescriptions    Hyperlipidemia      Hypertension     Hypothyroidism     Morbid obesity     Neuromuscular disorder     Sleep apnea     Thyroid disease         Past Surgical History:   Past Surgical History:   Procedure Laterality Date    APPENDECTOMY      CATARACT EXTRACTION W/  INTRAOCULAR LENS IMPLANT Bilateral     MFIOL OU    CORONARY ANGIOGRAPHY Bilateral 02/24/2021    Procedure: ANGIOGRAM, CORONARY ARTERY;  Surgeon: Cresencio Ridley MD;  Location: [facility] CATH LAB;  Service: Cardiology;  Laterality: Bilateral;  Covid test needed - ok per Danay SSCU. Pt. w/o transportation or family    ENDOSCOPIC ULTRASOUND OF UPPER GASTROINTESTINAL TRACT N/A 03/17/2021    Procedure: ULTRASOUND, UPPER GI TRACT, ENDOSCOPIC;  Surgeon: Gerald Anaya MD;  Location: [facility] (2ND FLR);  Service: Endoscopy;  Laterality: N/A;  Pt unable to get a ride for swab. Will do rapid test at 8:30 - ttr    ENDOSCOPIC ULTRASOUND OF UPPER GASTROINTESTINAL TRACT N/A 01/13/2022    Procedure: ULTRASOUND, UPPER GI TRACT, ENDOSCOPIC;  Surgeon: Kevin Carballo MD;  [facility]  Service: Endoscopy;  Laterality: N/A;  blood thinner approval received, see telephone encounter 1/7/22-BB  rapid  instructions given verbally and sent to address on file in pt's chart-BB    ENDOSCOPIC ULTRASOUND OF UPPER GASTROINTESTINAL TRACT N/A 10/11/2022    Procedure: ULTRASOUND, UPPER GI TRACT, ENDOSCOPIC;  Surgeon: Dequan Ridley MD;  [facility]  Service: Endoscopy;  Laterality: N/A;    ENDOSCOPIC ULTRASOUND OF UPPER GASTROINTESTINAL TRACT N/A 01/24/2024    Procedure: ULTRASOUND, UPPER GI TRACT, ENDOSCOPIC;  Surgeon: Kevin Carballo MD;  [facility]  Service: Endoscopy;  Laterality: N/A;    ENDOSCOPIC ULTRASOUND OF UPPER GASTROINTESTINAL TRACT N/A 03/05/2024    Procedure: ULTRASOUND, UPPER GI TRACT, ENDOSCOPIC;  Surgeon: Kevin Carballo MD;  [facility]  Service: Endoscopy;  Laterality: N/A;  1/25 portal instr-Repeat  theEUS at the next available appointment because the preparation was poor. 48hrs of liquid. Ozempic 7 day hold-eliquis approved Dr. Pelaez TE 12/12/2023-tt  2/28-precall complete-pt verbalized udnerstanding of holding Oz    ESOPHAGOGASTRODUODENOSCOPY N/A 02/05/2021    Procedure: EGD (ESOPHAGOGASTRODUODENOSCOPY);  Surgeon: Matthew Hernadez MD;  Location: [facility] (2ND FLR);  Service: Endoscopy;  Laterality: N/A;  BMI-58    Wt: 361#     COVID test at [facility] on 2/2-GT    ESOPHAGOGASTRODUODENOSCOPY N/A 03/17/2021    Procedure: EGD (ESOPHAGOGASTRODUODENOSCOPY);  Surgeon: Gerald Anaya MD;  Location: [facility] (2ND FLR);  Service: Endoscopy;  Laterality: N/A;    ESOPHAGOGASTRODUODENOSCOPY N/A 05/25/2021    Procedure: EGD (ESOPHAGOGASTRODUODENOSCOPY);  Surgeon: Kevin Carballo MD;  Location: [facility] (2ND FLR);  Service: Endoscopy;  Laterality: N/A;  ESD 2 hours  Full COVID vaccination on file. ttr    ESOPHAGOGASTRODUODENOSCOPY N/A 01/13/2022    Procedure: EGD (ESOPHAGOGASTRODUODENOSCOPY);  Surgeon: Kevin Carballo MD;  Location: [facility] (2ND FLR);  Service: Endoscopy;  Laterality: N/A;  blood thinner approval, see telephone encounter 1/7/22-BB  rapid  instructions given verbally and sent to address on file in pt's chart-BB    ESOPHAGOGASTRODUODENOSCOPY N/A 10/11/2022    Procedure: EGD (ESOPHAGOGASTRODUODENOSCOPY);  Surgeon: Dequan Ridley MD;  Location: [facility] (2ND FLR);  Service: Endoscopy;  Laterality: N/A;  She is do for EGD/EUS now. Personal history of gastric cancer. Ca Coats #[id number].   Thanks,   Kevin Fuentes MD    Pt is fully vaccinated-DS  9/14/22-Approval to hold Eliquis rec'd from Dr. Pelaez (see telephone encounter 9/14/22)-DS  9/14/22-Ins    ESOPHAGOGASTRODUODENOSCOPY N/A 01/24/2024    Procedure: EGD (ESOPHAGOGASTRODUODENOSCOPY);  Surgeon: Kevin Carballo MD;  Location: [facility] ([address] [place] [address]);  Service: Endoscopy;  Laterality: N/A;  12/8/23: instructions sent via portal. eliquis approval  from MD Pelaez in telephone encounter (12/12/23) -GD  1/9-precall complete-MS    ESOPHAGOGASTRODUODENOSCOPY N/A 03/05/2024    Procedure: EGD (ESOPHAGOGASTRODUODENOSCOPY);  Surgeon: Kevin Carballo MD;  Location: [facility] ([address] [place] [address]);  Service: Endoscopy;  Laterality: N/A;    EYE SURGERY      HYSTEROSCOPIC POLYPECTOMY OF UTERUS  01/10/2024    Procedure: POLYPECTOMY, UTERUS, HYSTEROSCOPIC;  Surgeon: Pina Pickens MD;  Location: [facility];  Service: OB/GYN;;    HYSTEROSCOPY WITH DILATION AND CURETTAGE OF UTERUS N/A 01/10/2024    Procedure: HYSTEROSCOPY, WITH DILATION AND CURETTAGE OF UTERUS;  Surgeon: Pina Pickens MD;  Location: [facility] [place] OR;  Service: OB/GYN;  Laterality: N/A;    INJECTION OF ANESTHETIC AGENT AROUND NERVE Left 07/10/2018    Procedure: BLOCK, NERVE;  Surgeon: Samantha Vidal MD;  Location: [facility] [place], [facility] PAIN MGT;  Service: Pain Management;  Laterality: Left;  Genicular     INJECTION OF ANESTHETIC AGENT AROUND NERVE Left 07/19/2019    Procedure: Block, Nerve NERVE BLOCK GENICULAR WITH PHENOL 6%;  Surgeon: Samantha Vidal MD;  Location: [facility] [place], operated [facility] PAIN MGT;  Service: Pain Management;  Laterality: Left;  NEEDS CONSENT    INSERTION OF TUNNELED CENTRAL VENOUS CATHETER (CVC) WITH SUBCUTANEOUS PORT N/A 07/09/2021    Procedure: INSERTION, PORT-A-CATH;  Surgeon: Mario Paz MD;  Location: [facility] [place], [facility] CATH LAB;  Service: Radiology;  Laterality: N/A;  PORT PLACEMENT    RADIOFREQUENCY ABLATION Left 06/19/2020    Procedure: RADIOFREQUENCY ABLATION LEFT GENICULAR;  Surgeon: Tevin Clark MD;  Location: [facility] [place], operated [facility] PAIN MGT;  Service: Pain Management;  Laterality: Left;  Left Genicular RFA    RADIOFREQUENCY ABLATION Left 01/22/2021    Procedure: RADIOFREQUENCY ABLATION LEFT GENICULAR;  Surgeon: Tevin Clark MD;  Location: [facility] [place], [facility] PAIN MGT;  Service: Pain Management;  Laterality: Left;    RADIOFREQUENCY ABLATION Left 07/30/2021    Procedure: RADIOFREQUENCY ABLATION, GENICULAR COOLED NEED CONSENT;  Surgeon: Tevin Clark MD;   Location: [facility] PAIN MGT;  Service: Pain Management;  Laterality: Left;    RADIOFREQUENCY ABLATION Left 2022    Procedure: RADIOFREQUENCY ABLATION, GENICULAR COOLED , LEFT;  Surgeon: Tevin Clark MD;  Location: [facility] PAIN MGT;  Service: Pain Management;  Laterality: Left;    RADIOFREQUENCY ABLATION Left 2022    Procedure: RADIOFREQUENCY ABLATION LEFT GENICULAR COOLED CLEARED TO HOLD ELIQUIS 3 DAYS  NEEDS CONSENT;  Surgeon: Tevin Clark MD;  Location: [facility] PAIN MGT;  Service: Pain Management;  Laterality: Left;    TONSILLECTOMY          Family History:   Family History   Problem Relation Name Age of Onset    No Known Problems Mother      No Known Problems Father      Colon cancer Neg Hx      Esophageal cancer Neg Hx          Social History:   Social History     Tobacco Use    Smoking status: Some Days     Current packs/day: 0.00     Average packs/day: 1 pack/day for 53.9 years (53.7 ttl pk-yrs)     Types: Cigarettes     Start date:      Last attempt to quit: 2023     Years since quittin.8    Smokeless tobacco: Never    Tobacco comments:     currently smoking <5 cigarettes most days   Substance Use Topics    Alcohol use: Yes     Alcohol/week: 1.0 standard drink of alcohol     Types: 1 Glasses of wine per week     Comment: rarely        I have reviewed and updated the patient's past medical, surgical, family and social histories.    Allergies:   Review of patient's allergies indicates:  No Known Allergies     Medications:   Current Outpatient Medications   Medication Sig Dispense Refill    acetaminophen (TYLENOL) 500 MG tablet Take by mouth daily as needed.      amLODIPine (NORVASC) 10 MG tablet Take 1 tablet (10 mg total) by mouth once daily. 90 tablet 11    atorvastatin (LIPITOR) 20 MG tablet Take 1 tablet (20 mg total) by mouth once daily. 90 tablet 11    B-complex with vitamin C (Z-BEC OR EQUIV) tablet Take 1 tablet by mouth once daily.       CALCIUM CITRATE-VITAMIN D2 ORAL Take 1  "tablet by mouth once daily.       citalopram (CELEXA) 10 MG tablet Take 1 tablet (10 mg total) by mouth once daily. 90 tablet 11    ferrous gluconate (FERGON) 324 MG tablet TAKE 1 TABLET (324 MG TOTAL) BY MOUTH DAILY WITH BREAKFAST. (Patient not taking: Reported on 10/21/2024) 90 tablet 3    furosemide (LASIX) 80 MG tablet Take 1 tablet (80 mg total) by mouth 2 (two) times daily. 180 tablet 11    gabapentin (NEURONTIN) 300 MG capsule Take 2 capsules (600 mg total) by mouth 3 (three) times daily. 540 capsule 2    lisinopriL (PRINIVIL,ZESTRIL) 40 MG tablet Take 1 tablet (40 mg total) by mouth once daily. 90 tablet 11    multivitamin with minerals tablet Take 1 tablet by mouth once daily.       oxyCODONE-acetaminophen (PERCOCET) 5-325 mg per tablet Take 1 tablet by mouth every 8 (eight) hours as needed for Pain. 90 tablet 0    pantoprazole (PROTONIX) 40 MG tablet Take 1 tablet (40 mg total) by mouth once daily. 90 tablet 11    semaglutide (OZEMPIC) 1 mg/dose (4 mg/3 mL) Inject 1 mg into the skin every 7 days. 9 each 11     No current facility-administered medications for this visit.     Facility-Administered Medications Ordered in Other Visits   Medication Dose Route Frequency Provider Last Rate Last Admin    0.9%  NaCl infusion   Intravenous Continuous Tevin Clark MD 0 mL/hr at 01/13/22 1055 New Bag at 03/05/24 1000    0.9%  NaCl infusion   Intravenous Continuous Kevin Carballo MD        sodium chloride 0.9% flush 10 mL  10 mL Intravenous PRN Kevin Carballo MD            Physical Exam:   /60 (BP Location: Right arm)   Pulse 70   Temp 98 °F (36.7 °C) (Oral)   Resp 14   Ht 5' 6" (1.676 m)   Wt 121.1 kg (266 lb 13.9 oz)   SpO2 98%   BMI 43.07 kg/m²                Physical Exam  Constitutional:       Appearance: Normal appearance.      Comments: wheelchair   HENT:      Head: Normocephalic and atraumatic.      Mouth/Throat:      Mouth: Mucous membranes are moist.   Eyes:      Extraocular " Movements: Extraocular movements intact.      Pupils: Pupils are equal, round, and reactive to light.   Cardiovascular:      Rate and Rhythm: Normal rate and regular rhythm.      Pulses: Normal pulses.      Heart sounds: No murmur heard.  Pulmonary:      Effort: Pulmonary effort is normal. No respiratory distress.      Breath sounds: Normal breath sounds.   Abdominal:      General: Abdomen is flat. There is no distension.      Palpations: Abdomen is soft.      Tenderness: There is no abdominal tenderness.   Musculoskeletal:         General: No swelling or tenderness. Normal range of motion.      Cervical back: Normal range of motion. No rigidity.   Skin:     General: Skin is warm and dry.      Coloration: Skin is not jaundiced.   Neurological:      General: No focal deficit present.      Mental Status: She is alert and oriented to person, place, and time.   Psychiatric:         Mood and Affect: Mood normal.         Behavior: Behavior normal.           Labs:   Lab Results   Component Value Date    WBC 12.05 10/22/2024    HGB 11.7 (L) 10/22/2024    HCT 38.4 10/22/2024     (H) 10/22/2024     10/22/2024       Lab Results   Component Value Date     10/22/2024    K 4.6 10/22/2024     10/22/2024    CO2 27 10/22/2024    BUN 23 10/22/2024    CREATININE 1.0 10/22/2024    ALBUMIN 3.2 (L) 10/22/2024    BILITOT 0.4 10/22/2024    ALKPHOS 115 10/22/2024    AST 15 10/22/2024    ALT 15 10/22/2024       Imaging:   US Lower Extremity Veins Bilateral  Narrative: EXAMINATION:  US LOWER EXTREMITY VEINS BILATERAL    CLINICAL HISTORY:  rule out DVT;    TECHNIQUE:  Duplex and color flow Doppler and dynamic compression was performed of the bilateral lower extremity veins was performed.    COMPARISON:  Ultrasound from 09/16/2021.    FINDINGS:  Right thigh veins: The common femoral, femoral, popliteal, upper greater saphenous, and deep femoral veins are patent and free of thrombus. The veins are normally compressible  and have normal phasic flow and augmentation response.    Right calf veins: The visualized calf veins are patent.    Left thigh veins: The common femoral, femoral, popliteal, upper greater saphenous, and deep femoral veins are patent and free of thrombus. The veins are normally compressible and have normal phasic flow and augmentation response.    Left calf veins: The visualized calf veins are patent.    Miscellaneous: None  Impression: No evidence of deep venous thrombosis in either lower extremity.    Electronically signed by: Carson Coughlin  Date:    10/03/2024  Time:    23:49  X-Ray Chest AP Portable  Narrative: EXAMINATION:  XR CHEST AP PORTABLE    CLINICAL HISTORY:  3 h s/p left lung bx;    TECHNIQUE:  Single frontal view of the chest was performed.    COMPARISON:  10/03/2024 at 13:33    FINDINGS:  The patient is s/p percutaneous biopsy of left lung nodule.  There is no evidence for pneumothorax.  The remainder of the examination is unchanged when compared to the prior study at 13:33.  Impression: No evidence for pneumothorax in this patient status post percutaneous biopsy of left lung nodule.    Electronically signed by: Hang Owens MD  Date:    10/03/2024  Time:    16:54  X-Ray Chest AP Portable  Narrative: EXAMINATION:  XR CHEST AP PORTABLE    CLINICAL HISTORY:  2 h s/p left lung bx;    TECHNIQUE:  Single frontal view of the chest was performed.    COMPARISON:  10/03/2024 at 12:36    FINDINGS:  The patient is s/p percutaneous biopsy of left lung nodule.  There is no evidence for pneumothorax.  The remainder of the examination is unchanged.  Impression: No evidence for pneumothorax in this patient status post percutaneous biopsy of left lung nodule.    Electronically signed by: Hang Owens MD  Date:    10/03/2024  Time:    16:53  X-Ray Chest AP Portable  Narrative: EXAMINATION:  XR CHEST AP PORTABLE    CLINICAL HISTORY:  1 h s/p left lung bx;    TECHNIQUE:  Single frontal view of the chest was  performed.    COMPARISON:  January 12, 2022    FINDINGS:  Reconfirmed right jugular tylor catheter, tip superimposing right atrial soft tissues.  Upper normal heart size.  Mild central pulmonary vascular prominence as before.  In the setting of less well inspiratory effort, new and or interval progression in the extent of bilateral coarse interstitial markings.  This could relate to level inspiratory effort and atelectasis, interstitial infiltrates and or edema not excluded.  In this patient who is 1 hour status post biopsy of peripherally positioned left lung nodule, a 2-2.5 cm triangular focus of pleural based soft tissue density projects about the middle 3rd of the left hemithorax.  This could be in the area of prior biopsy and could relate to an area of post biopsy bleeding and or atelectasis and or infiltrate.  Could not exclude small amount of left pleural fluid..  No definite left-sided pneumothorax.  EKG leads superimpose chest.  Impression: As above    Electronically signed by: Tyrone Hwang  Date:    10/03/2024  Time:    14:39  IR Biopsy Lung w/ guidance  Narrative: EXAMINATION:  Image-guided biopsy    Procedural Personnel    Attending physician(s): Ravi Nogueira MD    Fellow physician(s): None    Resident physician(s): None    Advanced practice provider(s): None    Pre-procedure diagnosis: Left lung nodule    Post-procedure diagnosis: Same    Indication: Histopathologic diagnosis    Previous biopsy of same target (QCDR): No    Additional clinical history: None    Complications: No immediate complications.    TECHNIQUE:  - Percutaneous CT -guided coaxial core needle biopsy    FINDINGS:  Pre-procedure    Consent: Informed consent for the procedure was obtained and time-out was performed prior to the procedure.    Preparation: The site was prepared and draped using maximal sterile barrier technique including cutaneous antisepsis.    Anesthesia/sedation    Level of anesthesia/sedation: General  anesthesia    Anesthesia/sedation administered by: Anesthesiology    Biopsy    Local anesthesia was administered. Under imaging guidance as stated in the procedure summary, the biopsy needle was advanced to the target and biopsy was performed.    Coaxial needle: 17 gauge    Core needle biopsy device: [employer]    Core needle size: 18 gauge    Number of core specimens: 5    On-site biopsy touch preparation: Yes    Additional sampling recommendations: None    Preliminary assessment of sample adequacy: Adequacy not confirmed    Radiation Dose    CT dose length product ( mGy-cm): 1456    Additional Details    Additional description of procedure: None    Equipment details: None    Specimens removed: Biopsy samples as detailed above    Estimated blood loss (mL): Less than 10    Attestation    Signer name: Ravi Nogueira MD    I attest that I was present for the entire procedure. I reviewed the stored images and agree with the report as written.  Impression: Image-guided biopsy of left lung nodule.    Plan:    Specimen(s) sent for evaluation.    _______________________________________________________________    Electronically signed by: Ravi Nogueira  Date:    10/03/2024  Time:    12:13            Path:   5/2021 STOMACH, ESD:   Adenocarcinoma, moderate to poorly differentiated   Tumor invades deep layers of submuocsa with deep margin extensively positive   Deep margin is extensively positive   Lateral margins are negative for neoplasia or malignancy   Extensive foveolar hyperplasia with intestinal metaplasia   SYNOPTIC REPORT   Specimen: Stomach   Procedure: Endoscopic submuocsal dissection   Tumor size: 3.0 x 2.2 cm   Tumor type: Adenocarcinoma, intestinal type   Histologic grade: Moderately to poorly differentiated   Microscopic extent of the tumor: Tumor invades deep layers of submuocsa with   deep margin extensively positive   Margins:  Deep margin is extensively positiv e; Lateral margins are negative   for  neoplasia or malignancy   Lymphovascular invasion: Focal positive   Perineural invasion: Present   Pathologic staging: p T1 (at-least) Nx Mx   Number of lymph nodes examined: Not submitted     3/2024 ESD Gastric antrum, ESD scar, biopsy:   Antral mucosa with mild chronic inflammation and reactive changes.    No H.pylori identified on H&E stain slide.    No intestinal metaplasia or dysplasia.       Assessment:       1. Gastric adenocarcinoma    2. Pulmonary nodules            Plan:             # Gastric adenocarcinoma - Patient was diagnosed in 2021 with T1 gastric adenocarcinoma sp ESD. She received 4 cycles of FLOT 7 - 9/2021 but was not felt to be a surgical candidate after chemotherapy due to comorbidities. Patient subsequently treated with chemoradiation 2-3/2022. She follows with Dr. Carballo, most recent surveillance EGD without evidence of recurrence.     CT CAP from last visit with suspicious pulmonary nodules and patient established with pulmonary Dr. Crook. Short interval repeat CT chest with 1 cm peripheral lung nodule. Patient underwent biopsy concerning for carcinoma. Pathologist pulled gastric pathology but is unable to determine if this is the same malignancy. Patient is amenable to proceed with work-up and says she would be willing to proceed with systemic therapy if necessary. Although she is in wheelchair today she is active and ambulatory at home.      Case discussed with pathology & pulmonary.     - NCCN guidelines recommend imaging q6m x2y and then annually up to 5y, however since patient did not have a gastrectomy will consider prolonged imaging surveillance  - last CT CAP 4/2024, PET scan 10/2024 as below  - EUS per Dr. Carballo     # Pulmonary nodules - Plan for PET scan and repeat biopsy next available. Will fu after biopsy due to upcoming cerebral aneurysm surgery    # HTN - /60    # Cerebral aneurysm - repair scheduled 11/20/24. Will plan for PET and biopsy prior to procedure if possible  and follow-up post-op to review pathology and treatment plan.     # NILSON - holding oral iron supplement for now    Follow up: after PET scan     The above information has been reviewed with the patient and all questions have been answered to their apparent satisfaction.  They understand that they can call the clinic with any questions.    Rossana Motley MD  Hematology/Oncology  [facility] [place]      Med Onc Chart Routing      Follow up with physician 6 weeks. aurelia   Follow up with FRE[facility]OM    Infusion scheduling note    Injection scheduling note    Labs    Imaging PET scan   next available   Pharmacy appointment    Other referrals

## 2024-10-28 DIAGNOSIS — G89.4 CHRONIC PAIN SYNDROME: ICD-10-CM

## 2024-10-28 RX ORDER — OXYCODONE AND ACETAMINOPHEN 5; 325 MG/1; MG/1
1 TABLET ORAL EVERY 8 HOURS PRN
Qty: 90 TABLET | Refills: 0 | Status: SHIPPED | OUTPATIENT
Start: 2024-11-01 | End: 2024-10-30 | Stop reason: SDUPTHER

## 2024-10-29 ENCOUNTER — TELEPHONE (OUTPATIENT)
Dept: CARDIOLOGY | Facility: CLINIC | Age: 74
End: 2024-10-29
Payer: MEDICARE

## 2024-10-30 DIAGNOSIS — G89.4 CHRONIC PAIN SYNDROME: ICD-10-CM

## 2024-10-30 RX ORDER — OXYCODONE AND ACETAMINOPHEN 5; 325 MG/1; MG/1
1 TABLET ORAL EVERY 8 HOURS PRN
Qty: 90 TABLET | Refills: 0 | Status: SHIPPED | OUTPATIENT
Start: 2024-11-02 | End: 2024-12-02

## 2024-11-01 ENCOUNTER — TELEPHONE (OUTPATIENT)
Dept: PAIN MEDICINE | Facility: CLINIC | Age: 74
End: 2024-11-01
Payer: MEDICARE

## 2024-11-01 ENCOUNTER — HOSPITAL ENCOUNTER (OUTPATIENT)
Dept: RADIOLOGY | Facility: HOSPITAL | Age: 74
Discharge: HOME | End: 2024-11-01
Attending: STUDENT IN AN ORGANIZED HEALTH CARE EDUCATION/TRAINING PROGRAM
Payer: MEDICARE

## 2024-11-01 DIAGNOSIS — R91.8 PULMONARY NODULES: ICD-10-CM

## 2024-11-01 DIAGNOSIS — C16.9 GASTRIC ADENOCARCINOMA: ICD-10-CM

## 2024-11-01 LAB — POCT GLUCOSE: 82 MG/DL (ref 70–110)

## 2024-11-01 PROCEDURE — 78815 PET IMAGE W/CT SKULL-THIGH: CPT | Mod: TC

## 2024-11-01 PROCEDURE — 78815 PET IMAGE W/CT SKULL-THIGH: CPT | Mod: 26,PS,, | Performed by: NUCLEAR MEDICINE

## 2024-11-01 PROCEDURE — A9552 F18 FDG: HCPCS | Performed by: STUDENT IN AN ORGANIZED HEALTH CARE EDUCATION/TRAINING PROGRAM

## 2024-11-01 RX ORDER — OXYCODONE AND ACETAMINOPHEN 5; 325 MG/1; MG/1
1 TABLET ORAL EVERY 8 HOURS PRN
Qty: 90 TABLET | Refills: 0 | Status: SHIPPED | OUTPATIENT
Start: 2024-11-01

## 2024-11-01 RX ORDER — FLUDEOXYGLUCOSE F18 500 MCI/ML
12 INJECTION INTRAVENOUS
Status: COMPLETED | OUTPATIENT
Start: 2024-11-01 | End: 2024-11-01

## 2024-11-01 RX ADMIN — FLUDEOXYGLUCOSE F-18 10.97 MILLICURIE: 500 INJECTION INTRAVENOUS at 09:11

## 2024-11-05 ENCOUNTER — PATIENT MESSAGE (OUTPATIENT)
Dept: NEUROSURGERY | Facility: CLINIC | Age: 74
End: 2024-11-05
Payer: MEDICARE

## 2024-11-05 ENCOUNTER — TELEPHONE (OUTPATIENT)
Dept: CARDIOLOGY | Facility: CLINIC | Age: 74
End: 2024-11-05
Payer: MEDICARE

## 2024-11-05 DIAGNOSIS — I50.32 CHRONIC HEART FAILURE WITH PRESERVED EJECTION FRACTION: Primary | ICD-10-CM

## 2024-11-05 NOTE — TELEPHONE ENCOUNTER
"[facility]) DISCHARGE VISIT - PHONE     PT Called and spoke to RN    Most Recent Hospital Discharge Date: 10-4-24  Last admission Diagnosis/chief complaint: SOB    Pt discharge completed by phone related to Patient Preference      Pt reports the following:  [x]  Shortness of Breath with activity only if she over does it  []  Shortness of Breath at rest   []  Fatigue  []  Edema Teach;elevate legs higher than heart if edema  [] Chest pain or tightness  [] Weight Increase since discharge  [] None of the above    Medications:   Medication reconciliation completed today per RN.  Pt reports having all medications available and understands how to take them appropriately. Reminded pt to call prior to making any changes to medications.  Has a lot of Fluid Pills    Education:   [x] Confirmed pt still has  "[facility] Home Care Guide" .   Reviewed key points as listed below.     Recommend 2 gram sodium restriction and 1500cc fluid restriction.  Encourage physical activity with graded exercise program.  Requested patient to weigh themselves daily, and to notify us if their weight increases by more than 3 lbs in 1 day or 5 lbs in 3 days.     [x] Reviewed completed "Daily Weight and Symptom Tracker".  Reviewed with patient when and how to call [facility] according to "Yellow Zone" and "Red Zone".       Watch for these Signs and Symptoms: If any of these occur, contact [facility] immediately:   Increase in shortness of breath with movement   Increase in swelling in your legs and ankles   Weight gain of more than 3 pounds in a night or 5 pounds in 3 days.   Difficulty breathing when you are lying down   Worsening fatigue or tiredness   Stomach bloating, a full feeling or a loss of appetite   Increased coughing--especially when you are lying down    MyChart and Care Companion:   Patient active on myChart? Yes, patient uses regularly.    HF TCC Program Plan:  Pt has successfully " completed H[facility] program.  Pt care to be transferred to General Cardiology for long term care.     Pt educated on how to call their offices and how to call [facility] call in the event of an after hour issue.    PT reminded to continue to follow recommendations made during the [facility] program to include monitoring daily weights, taking medications according to list, following up to appointments per provider recommendations, stop smoking/ start exercising and following a heart friendly low salt, low fluid diet.      Pt was able to verbalize back to RN in their own words correct diet/fluid restrictions, necessity for exercise, warning signs and symptoms, when and how to contact their  Long term care team .      Plan: General Cardiology schedulers will set an appointment when Patient has an escort to come to her appt with her.        [x]  Discussed upcoming appointments and/or plan for follow-up care with his/her PCP/Cardiology  As above    Electronic hand off completed :  Epic notes per Janice Orona PA-C available to Health Care providers.    Certificate of Completion to be mailed by ANJELICA Chung MA  Discussed [facility] appt with Fanta Terry RN, PT needs an appt when she can get an escort to come with her. Fanta and Ju Boles to accommodate.     Please refer to provider note for additional details and assessment.

## 2024-11-05 NOTE — TELEPHONE ENCOUNTER
"[facility]    Attempted to call pt to complete 1 week "check in"  and discharge phone call. Unable to reach pt at listed phone numbers.  Was able to leave message on voicemail encouraging pt to return call with TCC phone number.  "

## 2024-11-06 ENCOUNTER — TELEPHONE (OUTPATIENT)
Dept: CARDIOLOGY | Facility: CLINIC | Age: 74
End: 2024-11-06
Payer: MEDICARE

## 2024-11-06 ENCOUNTER — TELEPHONE (OUTPATIENT)
Dept: HEMATOLOGY/ONCOLOGY | Facility: CLINIC | Age: 74
End: 2024-11-06
Payer: MEDICARE

## 2024-11-06 DIAGNOSIS — R91.8 PULMONARY NODULES: Primary | ICD-10-CM

## 2024-11-07 ENCOUNTER — TELEPHONE (OUTPATIENT)
Dept: HEMATOLOGY/ONCOLOGY | Facility: CLINIC | Age: 74
End: 2024-11-07
Payer: MEDICARE

## 2024-11-07 DIAGNOSIS — R91.8 OTHER NONSPECIFIC ABNORMAL FINDING OF LUNG FIELD: ICD-10-CM

## 2024-11-07 NOTE — TELEPHONE ENCOUNTER
Patient's daughter would like to discuss results.  ----- Message from Consuelo sent at 11/7/2024  1:09 PM CST -----  Regarding: Consult/Advisory/Results  Contact: [phone]  Consult/Advisory/Results      Name Of Caller: Lillian - daughter         Contact Preference: [phone]     Nature of call: would like to go over her mother's most recent results. Please call to advise thank you

## 2024-11-07 NOTE — NURSING
Navigator spoke with patient and notified of the scheduled PFT, CT chest and Appointment with Dr. Mcdowell.  Patient is active on the patient portal.  Provided my direct contact information.  Patient is active on the patient portal.  Oncology Navigation   Intake      Treatment  Current Status: Active  Date Presented to Tumor Board: 11/06/24  Presentation Notes: Refer to thoracic surgery for wedge biopsy. Pending pathology, recommend referral to Rad/onc for RT    Surgical Oncologist: Jeremias  Consult Date: 11/13/24  Surgery Schedule Date: 11/13/24                          Acuity      Follow Up  No follow-ups on file.

## 2024-11-08 ENCOUNTER — TELEPHONE (OUTPATIENT)
Dept: HEMATOLOGY/ONCOLOGY | Facility: CLINIC | Age: 74
End: 2024-11-08
Payer: MEDICARE

## 2024-11-11 ENCOUNTER — TELEPHONE (OUTPATIENT)
Dept: INTERNAL MEDICINE | Facility: CLINIC | Age: 74
End: 2024-11-11
Payer: MEDICARE

## 2024-11-11 ENCOUNTER — PATIENT MESSAGE (OUTPATIENT)
Dept: NEUROSURGERY | Facility: CLINIC | Age: 74
End: 2024-11-11
Payer: MEDICARE

## 2024-11-12 ENCOUNTER — TELEPHONE (OUTPATIENT)
Dept: HEMATOLOGY/ONCOLOGY | Facility: CLINIC | Age: 74
End: 2024-11-12
Payer: MEDICARE

## 2024-11-12 ENCOUNTER — HOSPITAL ENCOUNTER (OUTPATIENT)
Dept: RADIOLOGY | Facility: HOSPITAL | Age: 74
Discharge: HOME | End: 2024-11-12
Attending: THORACIC SURGERY (CARDIOTHORACIC VASCULAR SURGERY)
Payer: MEDICARE

## 2024-11-12 ENCOUNTER — TELEPHONE (OUTPATIENT)
Dept: CARDIOLOGY | Facility: CLINIC | Age: 74
End: 2024-11-12
Payer: MEDICARE

## 2024-11-12 DIAGNOSIS — R91.8 PULMONARY NODULES: Primary | ICD-10-CM

## 2024-11-12 DIAGNOSIS — R91.8 OTHER NONSPECIFIC ABNORMAL FINDING OF LUNG FIELD: ICD-10-CM

## 2024-11-12 PROCEDURE — 71250 CT THORAX DX C-: CPT | Mod: TC

## 2024-11-12 NOTE — TELEPHONE ENCOUNTER
Patient notified of the scheduled appointment with Radiation Oncology.  Discussed the location of the clinic appointment.  Patient is active on the patient portal.

## 2024-11-12 NOTE — DISCHARGE INSTRUCTIONS
Your surgery has been scheduled for:__11/20/2024    You should report to: [facility], located on the [facility] side of the            Second floor of the [facility] ([phone])    Please Note   Tell your doctor if you take Aspirin, products containing Aspirin, herbal medications  or blood thinners, such as Coumadin, Ticlid, or Plavix.  (Consult your provider regarding holding or stopping before surgery).  Arrange for someone to drive you home following surgery.  You will not be allowed to leave the surgical facility alone or drive yourself home following sedation and anesthesia.    Before Surgery  Stop taking all herbal medications, vitamins, and supplements 7 days prior to surgery  No Motrin/Advil (Ibuprofen) 7 days before surgery  No Aleve (Naproxen) 7 days before surgery  Stop Taking Asprin, products containing Asprin _7____days before surgery  Stop taking blood thinners_______days before surgery  No Goody's/BC  Powder 7 days before surgery  Refrain from drinking alcoholic beverages for 24hours before and after surgery  Stop or limit smoking ____7_____days before surgery  You may take Tylenol for pain    Night before Surgery  Do not eat or drink after midnight  Take a shower or bath (shower is recommended).  Bathe with Hibiclens soap or an antibacterial soap from the neck down.  If not supplied by your surgeon, hibiclens soap will need to be purchased over the counter in pharmacy.  Rinse soap off thoroughly.  Shampoo your hair with your regular shampoo    The Day of Surgery  Take another bath or shower with hibiclens or any antibacterial soap, to reduce the chance of infection.  Take heart and blood pressure medications with a small sip of water, as advised by the perioperative team.  Do not take fluid pills  You may brush your teeth and rinse your mouth, but do not swall any additional water.   Do not apply perfumes, powder, body lotions or deodorant on the day of  surgery.  Nail polish should be removed.  Do not wear makeup or moisturizer  Wear comfortable clothes, such as a button front shirt and loose fitting pants.  Leave all jewelry, including body piercings, and valuables at home.    Bring any devices you will neeed after surgery such as crutches or canes.  If you have sleep apnea, please bring your CPAP machine  In the event that your physical condition changes including the onset of a cold or respiratory illness, or if you have to delay or cancel your surgery, please notify your surgeon.      Anesthesia: General Anesthesia     You are watched continuously during your procedure by your anesthesia provider.     Youre due to have surgery. During surgery, youll be given medicine called anesthesia or anesthetic. This will keep you comfortable and pain-free. Your anesthesia provider will use general anesthesia.  What is general anesthesia?  General anesthesia puts you into a state like deep sleep. It goes into the bloodstream (IV anesthetics), into the lungs (gas anesthetics), or both. You feel nothing during the procedure. You will not remember it. During the procedure, the anesthesia provider monitors you continuously. He or she checks your heart rate and rhythm, blood pressure, breathing, and blood oxygen.  IV anesthetics. IV anesthetics are given through an IV line in your arm. Theyre often given first. This is so you are asleep before a gas anesthetic is started. Some kinds of IV anesthetics relieve pain. Others relax you. Your doctor will decide which kind is best in your case.  Gas anesthetics. Gas anesthetics are breathed into the lungs. They are often used to keep you asleep. They can be given through a facemask or a tube placed in your larynx or trachea (breathing tube).  If you have a facemask, your anesthesia provider will most likely place it over your nose and mouth while youre still awake. Youll breathe oxygen through the mask as your IV anesthetic is  started. Gas anesthetic may be added through the mask.  If you have a tube in the larynx or trachea, it will be inserted into your throat after youre asleep.  Anesthesia tools and medicines  You will likely have:  IV anesthetics. These are put into an IV line into your bloodstream.  Gas anesthetics. You breathe these anesthetics into your lungs, where they pass into your bloodstream.  Pulse oximeter. This is a small clip that is attached to the end of your finger. This measures your blood oxygen level.  Electrocardiography leads (electrodes). These are small sticky pads that are placed on your chest. They record your heart rate and rhythm.  Blood pressure cuff. This reads your blood pressure.  Risks and possible complications  General anesthesia has some risks. These include:  Breathing problems  Nausea and vomiting  Sore throat or hoarseness (usually temporary)  Allergic reaction to the anesthetic  Irregular heartbeat (rare)  Cardiac arrest (rare)   Anesthesia safety  Follow all instructions you are given for how long not to eat or drink before your procedure.  Be sure your doctor knows what medicines and drugs you take. This includes over-the-counter medicines, herbs, supplements, alcohol or other drugs. You will be asked when those were last taken.  Have an adult family member or friend drive you home after the procedure.  For the first 24 hours after your surgery:  Do not drive or use heavy equipment.  Do not make important decisions or sign legal documents. If important decisions or signing legal documents is necessary during the first 24 hours after surgery, have a trusted family member or spouse act on your behalf.  Avoid alcohol.  Have a responsible adult stay with you. He or she can watch for problems and help keep you safe.  Date Last Reviewed: 12/1/2016 © 2000-2017 [employer]. [address] [place] [address], [place] [address]. All rights reserved. This information is not intended as a substitute  for professional medical care. Always follow your healthcare professional's instructions.

## 2024-11-14 ENCOUNTER — PATIENT MESSAGE (OUTPATIENT)
Dept: NEUROSURGERY | Facility: CLINIC | Age: 74
End: 2024-11-14
Payer: MEDICARE

## 2024-11-15 ENCOUNTER — HOSPITAL ENCOUNTER (OUTPATIENT)
Dept: PREADMISSION TESTING | Facility: HOSPITAL | Age: 74
Discharge: HOME | End: 2024-11-15
Attending: NURSE PRACTITIONER | Admitting: NEUROLOGICAL SURGERY
Payer: MEDICARE

## 2024-11-15 ENCOUNTER — OFFICE VISIT (OUTPATIENT)
Dept: CARDIOLOGY | Facility: CLINIC | Age: 74
End: 2024-11-15
Payer: MEDICARE

## 2024-11-15 VITALS
SYSTOLIC BLOOD PRESSURE: 132 MMHG | OXYGEN SATURATION: 98 % | HEIGHT: 66 IN | HEART RATE: 68 BPM | BODY MASS INDEX: 43.72 KG/M2 | WEIGHT: 272.06 LBS | DIASTOLIC BLOOD PRESSURE: 61 MMHG | TEMPERATURE: 98 F

## 2024-11-15 VITALS
WEIGHT: 272.06 LBS | DIASTOLIC BLOOD PRESSURE: 61 MMHG | BODY MASS INDEX: 43.72 KG/M2 | SYSTOLIC BLOOD PRESSURE: 126 MMHG | HEART RATE: 71 BPM | OXYGEN SATURATION: 98 % | HEIGHT: 66 IN

## 2024-11-15 DIAGNOSIS — I70.0 ATHEROSCLEROSIS OF AORTA: ICD-10-CM

## 2024-11-15 DIAGNOSIS — I67.1 CEREBRAL ANEURYSM: ICD-10-CM

## 2024-11-15 DIAGNOSIS — Z72.0 TOBACCO ABUSE: Chronic | ICD-10-CM

## 2024-11-15 DIAGNOSIS — E66.01 MORBID OBESITY WITH BMI OF 45.0-49.9, ADULT: Chronic | ICD-10-CM

## 2024-11-15 DIAGNOSIS — I25.10 CORONARY ARTERY DISEASE INVOLVING NATIVE CORONARY ARTERY OF NATIVE HEART WITHOUT ANGINA PECTORIS: ICD-10-CM

## 2024-11-15 DIAGNOSIS — I50.32 CHRONIC DIASTOLIC CONGESTIVE HEART FAILURE: Chronic | ICD-10-CM

## 2024-11-15 DIAGNOSIS — I50.32 CHRONIC HEART FAILURE WITH PRESERVED EJECTION FRACTION: ICD-10-CM

## 2024-11-15 DIAGNOSIS — I10 ESSENTIAL HYPERTENSION: Primary | Chronic | ICD-10-CM

## 2024-11-15 DIAGNOSIS — E78.00 PURE HYPERCHOLESTEROLEMIA: Chronic | ICD-10-CM

## 2024-11-15 PROCEDURE — 99999 PR PBB SHADOW E&M-EST. PATIENT-LVL IV: CPT | Mod: PBBFAC,,, | Performed by: STUDENT IN AN ORGANIZED HEALTH CARE EDUCATION/TRAINING PROGRAM

## 2024-11-15 NOTE — PROGRESS NOTES
[facility]    CC:   Chief Complaint   Patient presents with    Pre-op Exam       Patient ID: Ca Coats is a 73 y.o. female with HTN, morbid obesity, tobacco abuse who presents for a follow up appointment.  Pertinent history events are as follows:    HPI  Today Ms. Ca Coats presents for cardiac evaluation prior to cerebral aneurysm surgery. She is planned for surgery 11/20. Her ambulation is limited due to morbid obesity and weakness. She states when she does exert herself too much she does feel short of breath but this is not new. She denies chest pain or orthopnea. She is not able to climb 2 flights of stairs due to weight. She still smokes and states a pack will last her about 3 days. She does get dizzy when she stands up. BP at the forearm 100/60.    Last clinic visit 1/25/2022: This is an established patient for me presenting with a new problem of LE edema. Her other chronic medical conditions include LAURA, HTN, HLD, Obesity, smoker, Mili CHF.   Previously followed at [facility].  An approximate 2017 patient was evaluated with a stress echo that was reportedly negative.  Patient was evaluated preoperatively for bariatric surgery.  She was noted to have a gastric ulcer at that time therefore procedure was canceled.   She has now established care at [facility] .  She desires to have a left knee replacement.  She underwent preoperative risk assessment with a dobutamine stress echo that was reported as positive with wall motion abnormalities in the basal inferolateral and basal anterolateral walls.  EF preserved 60% with 1/3 diastolic dysfunction. Referred to angiogram which was negative for CAD.   She continues to smoke. She is followed by smoking cessation at [facility].   She her hyperlipidemia is treated with Lipitor 20 and LDL is controlled.  She has not been on aspirin.  She ambulates with a walker/cane at baseline.  She only walks around the house to do her  activities of daily living.  She is not currently exercising.  She has not been able to walk without assistance for years.  She does note new onset LE edema and dyspnea with minimal activity.   This is the extent of the patient's complaints at this time. Patient queried and denies any further complaint.     Past Medical History:   Diagnosis Date    YOUNG (acute kidney injury) 10/15/2021    Anemia     2018    Arthritis     BMI 60.0-69.9, adult     Cataract     Chronic diastolic congestive heart failure 01/27/2021    Coronary artery disease involving native coronary artery of native heart without angina pectoris 11/15/2024    Deep vein thrombosis     Depression     Dry eye syndrome     DVT of deep femoral vein, right 05/29/2023    Gastric adenocarcinoma 05/25/2021    GERD (gastroesophageal reflux disease)     Glaucoma suspect     History of gastric ulcer     History of psychiatric hospitalization     Hx of psychiatric care     Celexa, no recall of charted Effexor, Lexapro, Desyrel prescriptions    Hyperlipidemia     Hypertension     Hypothyroidism     Morbid obesity     Neuromuscular disorder     Sleep apnea     Thyroid disease      Past Surgical History:   Procedure Laterality Date    APPENDECTOMY      CATARACT EXTRACTION W/  INTRAOCULAR LENS IMPLANT Bilateral     MFIOL OU    CORONARY ANGIOGRAPHY Bilateral 02/24/2021    Procedure: ANGIOGRAM, CORONARY ARTERY;  Surgeon: Cresencio Ridley MD;  Location: [facility] CATH LAB;  Service: Cardiology;  Laterality: Bilateral;  Covid test needed - ok per Danay MORENOU. Pt. w/o transportation or family    ENDOSCOPIC ULTRASOUND OF UPPER GASTROINTESTINAL TRACT N/A 03/17/2021    Procedure: ULTRASOUND, UPPER GI TRACT, ENDOSCOPIC;  Surgeon: Gerald Anaya MD;  Location: [facility] ([address] [place] [address]);  Service: Endoscopy;  Laterality: N/A;  Pt unable to get a ride for swab. Will do rapid test at 8:30 - ttr    ENDOSCOPIC ULTRASOUND OF UPPER GASTROINTESTINAL TRACT N/A 01/13/2022    Procedure:  ULTRASOUND, UPPER GI TRACT, ENDOSCOPIC;  Surgeon: Kevin Carballo MD;  Location: [facility] (2ND FLR);  Service: Endoscopy;  Laterality: N/A;  blood thinner approval received, see telephone encounter 1/7/22-BB  rapid  instructions given verbally and sent to address on file in pt's chart-BB    ENDOSCOPIC ULTRASOUND OF UPPER GASTROINTESTINAL TRACT N/A 10/11/2022    Procedure: ULTRASOUND, UPPER GI TRACT, ENDOSCOPIC;  Surgeon: Dequan Ridley MD;  Location: [facility] ENDO (2ND FLR);  Service: Endoscopy;  Laterality: N/A;    ENDOSCOPIC ULTRASOUND OF UPPER GASTROINTESTINAL TRACT N/A 01/24/2024    Procedure: ULTRASOUND, UPPER GI TRACT, ENDOSCOPIC;  Surgeon: Kevin Carballo MD;  Location: [facility] ENDO (2ND FLR);  Service: Endoscopy;  Laterality: N/A;    ENDOSCOPIC ULTRASOUND OF UPPER GASTROINTESTINAL TRACT N/A 03/05/2024    Procedure: ULTRASOUND, UPPER GI TRACT, ENDOSCOPIC;  Surgeon: Kevin Carballo MD;  Location: [facility] (2ND FLR);  Service: Endoscopy;  Laterality: N/A;  1/25 portal instr-Repeat theEUS at the next available appointment because the preparation was poor. 48hrs of liquid. Ozempic 7 day hold-eliquis approved Dr. Pelaez TE 12/12/2023-tt  2/28-precall complete-pt verbalized udnerstanding of holding Oz    ESOPHAGOGASTRODUODENOSCOPY N/A 02/05/2021    Procedure: EGD (ESOPHAGOGASTRODUODENOSCOPY);  Surgeon: Matthew Hernadez MD;  Location: [facility] (2ND FLR);  Service: Endoscopy;  Laterality: N/A;  BMI-58    Wt: 361#     COVID test at [facility] on 2/2-GT    ESOPHAGOGASTRODUODENOSCOPY N/A 03/17/2021    Procedure: EGD (ESOPHAGOGASTRODUODENOSCOPY);  Surgeon: Gerald Anaya MD;  Location: [facility] ENDO (2ND FLR);  Service: Endoscopy;  Laterality: N/A;    ESOPHAGOGASTRODUODENOSCOPY N/A 05/25/2021    Procedure: EGD (ESOPHAGOGASTRODUODENOSCOPY);  Surgeon: Kevin Carballo MD;  Location: [facility] (2ND FLR);  Service: Endoscopy;  Laterality: N/A;  ESD 2 hours  Full COVID vaccination on file. ttr    ESOPHAGOGASTRODUODENOSCOPY N/A  01/13/2022    Procedure: EGD (ESOPHAGOGASTRODUODENOSCOPY);  Surgeon: Kevin Carballo MD;  Location: [facility] ENDO (2ND FLR);  Service: Endoscopy;  Laterality: N/A;  blood thinner approval, see telephone encounter 1/7/22-BB  rapid  instructions given verbally and sent to address on file in pt's chart-BB    ESOPHAGOGASTRODUODENOSCOPY N/A 10/11/2022    Procedure: EGD (ESOPHAGOGASTRODUODENOSCOPY);  Surgeon: Dequan Ridley MD;  Location: [facility] ENDO (2ND FLR);  Service: Endoscopy;  Laterality: N/A;  She is do for EGD/EUS now. Personal history of gastric cancer. Ca Coats #[id number].   Thanks,   Kevin Fuentes MD    Pt is fully vaccinated-DS  9/14/22-Approval to hold Eliquis rec'd from Dr. Pelaez (see telephone encounter 9/14/22)-DS  9/14/22-Ins    ESOPHAGOGASTRODUODENOSCOPY N/A 01/24/2024    Procedure: EGD (ESOPHAGOGASTRODUODENOSCOPY);  Surgeon: Kevin Carballo MD;  Location: [facility] ENDO (2ND FLR);  Service: Endoscopy;  Laterality: N/A;  12/8/23: instructions sent via portal. eliquis approval from MD Pelaez in telephone encounter (12/12/23) -GD  1/9-precall complete-MS    ESOPHAGOGASTRODUODENOSCOPY N/A 03/05/2024    Procedure: EGD (ESOPHAGOGASTRODUODENOSCOPY);  Surgeon: Kevin Carballo MD;  Location: [facility] ENDO (2ND FLR);  Service: Endoscopy;  Laterality: N/A;    EYE SURGERY      HYSTEROSCOPIC POLYPECTOMY OF UTERUS  01/10/2024    Procedure: POLYPECTOMY, UTERUS, HYSTEROSCOPIC;  Surgeon: Pina Pickens MD;  Location: [facility] OR;  Service: OB/GYN;;    HYSTEROSCOPY WITH DILATION AND CURETTAGE OF UTERUS N/A 01/10/2024    Procedure: HYSTEROSCOPY, WITH DILATION AND CURETTAGE OF UTERUS;  Surgeon: Pina Pickens MD;  Location: [facility] OR;  Service: OB/GYN;  Laterality: N/A;    INJECTION OF ANESTHETIC AGENT AROUND NERVE Left 07/10/2018    Procedure: BLOCK, NERVE;  Surgeon: Samantha Vidal MD;  Location: [facility];  Service: Pain Management;  Laterality: Left;  Genicular     INJECTION OF ANESTHETIC AGENT AROUND  NERVE Left 07/19/2019    Procedure: Block, Nerve NERVE BLOCK GENICULAR WITH PHENOL 6%;  Surgeon: Samantha Vidal MD;  Location: [facility] PAIN MGT;  Service: Pain Management;  Laterality: Left;  NEEDS CONSENT    INSERTION OF TUNNELED CENTRAL VENOUS CATHETER (CVC) WITH SUBCUTANEOUS PORT N/A 07/09/2021    Procedure: INSERTION, PORT-A-CATH;  Surgeon: Mario Paz MD;  Location: [facility] CATH LAB;  Service: Radiology;  Laterality: N/A;  PORT PLACEMENT    RADIOFREQUENCY ABLATION Left 06/19/2020    Procedure: RADIOFREQUENCY ABLATION LEFT GENICULAR;  Surgeon: Tevin Clark MD;  Location: [facility] PAIN MGT;  Service: Pain Management;  Laterality: Left;  Left Genicular RFA    RADIOFREQUENCY ABLATION Left 01/22/2021    Procedure: RADIOFREQUENCY ABLATION LEFT GENICULAR;  Surgeon: Tevin Clark MD;  Location: [facility] PAIN MGT;  Service: Pain Management;  Laterality: Left;    RADIOFREQUENCY ABLATION Left 07/30/2021    Procedure: RADIOFREQUENCY ABLATION, GENICULAR COOLED NEED CONSENT;  Surgeon: Tevin Clark MD;  Location: [facility] PAIN MGT;  Service: Pain Management;  Laterality: Left;    RADIOFREQUENCY ABLATION Left 04/22/2022    Procedure: RADIOFREQUENCY ABLATION, GENICULAR COOLED , LEFT;  Surgeon: Tevin Clark MD;  Location: [facility] PAIN MGT;  Service: Pain Management;  Laterality: Left;    RADIOFREQUENCY ABLATION Left 12/23/2022    Procedure: RADIOFREQUENCY ABLATION LEFT GENICULAR COOLED CLEARED TO HOLD ELIQUIS 3 DAYS  NEEDS CONSENT;  Surgeon: Tevin Clark MD;  Location: [facility] PAIN MGT;  Service: Pain Management;  Laterality: Left;    TONSILLECTOMY       Social History     Socioeconomic History    Marital status: Single    Number of children: 2   Occupational History     Comment: retired  and cook   Tobacco Use    Smoking status: Some Days     Current packs/day: 0.00     Average packs/day: 1 pack/day for 53.9 years (53.7 ttl pk-yrs)     Types: Cigarettes     Start date: 1970     Last attempt to quit: 12/13/2023      Years since quittin.9    Smokeless tobacco: Never    Tobacco comments:     currently smoking <5 cigarettes most days   Substance and Sexual Activity    Alcohol use: Yes     Alcohol/week: 1.0 standard drink of alcohol     Types: 1 Glasses of wine per week     Comment: rarely    Drug use: No    Sexual activity: Not Currently     Partners: Male   Social History Narrative    2 children (dtr in area, son in [place]) and she has 3 grandkids (in [place]),    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    Originally from [facility]     Social Drivers of Health     Financial Resource Strain: Medium Risk (10/11/2024)    Overall Financial Resource Strain (CARDIA)     Difficulty of Paying Living Expenses: Somewhat hard   Food Insecurity: Food Insecurity Present (10/11/2024)    Hunger Vital Sign     Worried About Running Out of Food in the Last Year: Sometimes true     Ran Out of Food in the Last Year: Never true   Transportation Needs: Unmet Transportation Needs (10/31/2023)    PRAPARE - Transportation     Lack of Transportation (Medical): Yes     Lack of Transportation (Non-Medical): Yes   Physical Activity: Unknown (10/11/2024)    Exercise Vital Sign     Days of Exercise per Week: 5 days   Recent Concern: Physical Activity - Inactive (10/11/2024)    Exercise Vital Sign     Days of Exercise per Week: 5 days     Minutes of Exercise per Session: 0 min   Stress: Stress Concern Present (10/11/2024)    [employer] [place] [employer] - Occupational Stress Questionnaire     Feeling of Stress : Very much   Housing Stability: Low Risk  (10/31/2023)    Housing Stability Vital Sign     Unable to Pay for Housing in the Last Year: No     Number of Places Lived in the Last Year: 1     Unstable Housing in the Last Year: No     Family History   Problem Relation Name Age of Onset    No Known Problems Mother      No Known Problems Father      Colon cancer Neg Hx      Esophageal cancer Neg Hx         Review of patient's allergies  indicates:  No Known Allergies    Medication List with Changes/Refills   Current Medications    ACETAMINOPHEN (TYLENOL) 500 MG TABLET    Take by mouth daily as needed.    ATORVASTATIN (LIPITOR) 20 MG TABLET    Take 1 tablet (20 mg total) by mouth once daily.    B-COMPLEX WITH VITAMIN C (Z-BEC OR EQUIV) TABLET    Take 1 tablet by mouth once daily.     CALCIUM CITRATE-VITAMIN D2 ORAL    Take 1 tablet by mouth once daily.     CITALOPRAM (CELEXA) 10 MG TABLET    Take 1 tablet (10 mg total) by mouth once daily.    FUROSEMIDE (LASIX) 80 MG TABLET    Take 1 tablet (80 mg total) by mouth 2 (two) times daily.    GABAPENTIN (NEURONTIN) 300 MG CAPSULE    Take 2 capsules (600 mg total) by mouth 3 (three) times daily.    LISINOPRIL (PRINIVIL,ZESTRIL) 40 MG TABLET    Take 1 tablet (40 mg total) by mouth once daily.    MULTIVITAMIN WITH MINERALS TABLET    Take 1 tablet by mouth once daily.     OXYCODONE-ACETAMINOPHEN (PERCOCET) 5-325 MG PER TABLET    Take 1 tablet by mouth every 8 (eight) hours as needed for Pain.    OXYCODONE-ACETAMINOPHEN (PERCOCET) 5-325 MG PER TABLET    Take 1 tablet by mouth every 8 (eight) hours as needed for Pain.    PANTOPRAZOLE (PROTONIX) 40 MG TABLET    Take 1 tablet (40 mg total) by mouth once daily.    SEMAGLUTIDE (OZEMPIC) 1 MG/DOSE (4 MG/3 ML)    Inject 1 mg into the skin every 7 days.   Discontinued Medications    AMLODIPINE (NORVASC) 10 MG TABLET    Take 1 tablet (10 mg total) by mouth once daily.       Review of Systems  Constitution: Denies chills, fever, and sweats.  HENT: Denies headaches or blurry vision.  Cardiovascular: denies chest pain or palpitations  Respiratory: Denies cough or shortness of breath.  Gastrointestinal: Denies abdominal pain, nausea, or vomiting.  Musculoskeletal: Denies muscle cramps.  Neurological: Denies dizziness or focal weakness.  Psychiatric/Behavioral: Normal mental status.  Hematologic/Lymphatic: Denies bleeding problem or easy bruising/bleeding.  Skin: Denies  "rash or suspicious lesions    Physical Examination  /61 (Patient Position: Sitting)   Pulse 71   Ht 5' 6" (1.676 m)   Wt 123.4 kg (272 lb 0.8 oz)   SpO2 98%   BMI 43.91 kg/m²     Constitutional: Awake, alert, oriented, no acute distress, conversant. Morbidly obese  HEENT: Sclera anicteric, Pupils equal, round and reactive to light, extraocular motions intact  Neck: No JVD, no carotid bruits  Cardiovascular: normal rate, regular rhythm. 3+ LE pitting edema bilat  Pulmonary: Clear to auscultation bilaterally  Abdominal: Abdomen soft, nontender, nondistended  Skin: No ecchymosis, erythema, or ulcers  Psych: Alert and oriented x 3, appropriate affect  Neuro: CNII-XII intact, no focal deficits    Labs:  Most Recent Data  CBC:   Lab Results   Component Value Date    WBC 12.05 10/22/2024    HGB 11.7 (L) 10/22/2024    HCT 38.4 10/22/2024     10/22/2024     (H) 10/22/2024    RDW 12.3 10/22/2024     BMP:   Lab Results   Component Value Date     10/22/2024    K 4.6 10/22/2024     10/22/2024    CO2 27 10/22/2024    BUN 23 10/22/2024    CREATININE 1.0 10/22/2024    GLU 94 10/22/2024    CALCIUM 9.7 10/22/2024    MG 2.3 10/22/2024    PHOS 3.0 10/22/2024     LFTS;   Lab Results   Component Value Date    PROT 6.9 10/22/2024    ALBUMIN 3.2 (L) 10/22/2024    BILITOT 0.4 10/22/2024    AST 15 10/22/2024    ALKPHOS 115 10/22/2024    ALT 15 10/22/2024     COAGS:   Lab Results   Component Value Date    INR 0.9 10/14/2024     FLP:   Lab Results   Component Value Date    CHOL 86 (L) 12/03/2020    HDL 39 (L) 12/03/2020    LDLCALC 31.0 (L) 12/03/2020    TRIG 80 12/03/2020    CHOLHDL 45.3 12/03/2020     CARDIAC:   Lab Results   Component Value Date    BNP 55 10/22/2024       Echo: Echocardiogram: Transthoracic echo (TTE) complete (Cupid Only):   Results for orders placed or performed during the hospital encounter of 10/22/24   Echo   Result Value Ref Range    LV Diastolic Volume 128.52 mL    LV Diastolic " "Volume 147.42 mL    Echo EF Estimated 53 %    Echo EF Estimated 60 %    LV Systolic Volume 60.83 mL    LV Systolic Volume 59.58 mL    IVS 0.8 0.6 - 1.1 cm    IVS 0.8 0.6 - 1.1 cm    LVIDd 5.2 3.5 - 6.0 cm    LVIDd 5.5 3.5 - 6.0 cm    LVIDs 3.8 2.1 - 4.0 cm    LVIDs 3.7 2.1 - 4.0 cm    LVOT diameter 2.2 cm    PW 0.8 0.6 - 1.1 cm    PW 0.9 0.6 - 1.1 cm    IVRT 88.49 ms    AV LVOT peak gradient 4 mmHg    LVOT mn grad 2 mmHg    LVOT peak pierce 1.0 m/s    LVOT peak VTI 23.0 cm    RV- hinson basal diam 4.3 cm    RV S' 10.35 cm/s    LA size 4.31 cm    Left Atrium Major Axis 6.33 cm    Left Atrium Minor Axis 6.19 cm    LA Vol (MOD) 136.58 mL    RA Major [place] 5.22 cm    AV valve area 1.9 cm2    AV area by cont VTI 1.8 cm2    AV peak gradient 14.4 mmHg    AV mean gradient 7.5 mmHg    Ao peak pierce 1.9 m/s    Ao VTI 47.1 cm    MV Peak A Pierce 1.17 m/s    E wave deceleration time 242.70 ms    MV Peak E Pierce 0.90 m/s    E/A ratio 0.77     LV LATERAL E/E' RATIO 10.00     LV SEPTAL E/E' RATIO 12.86     TDI LATERAL 0.09 m/s    TDI SEPTAL 0.07 m/s    TV peak gradient 34 mmHg    TR Max Pierce 2.91 m/s    Ascending aorta 2.99 cm    STJ 2.51 cm    P vein A 0.3 m/s    MV "A" wave duration 110.37 ms    Pulm vein "A" wave 110.37 ms    PV Peak D Pierce 0.31 m/s    PV Peak S Pierce 0.44 m/s    Sinus 2.97 cm    LA WIDTH 4.81 cm    RA Width 3.63 cm    TAPSE 1.91 cm    BSA 2.38 m2    LVOT stroke volume 87.4 cm3    LVOT area 3.8 cm2    E/E' ratio 11.25 m/s    KATELYN 48.8 mL/m2    LA Vol 110.30 cm3    Pulm vein S/D ratio 1.42     KATELYN (MOD) 60.4 mL/m2    AV Velocity Ratio 0.53     AV index (prosthetic) 0.49     SAVANNAH by Velocity Ratio 2.0 cm²    Triscuspid Valve Regurgitation Peak Gradient 34 mmHg    Mean e' 0.08 m/s    TV resting pulmonary artery pressure 37 mmHg    RV TB RVSP 6 mmHg    Est. RA pres 3 mmHg    Narrative      Left Ventricle: The left ventricle is normal in size. Normal wall   thickness. There is normal systolic function with a visually estimated " "  ejection fraction of 60 - 65%. There is normal diastolic function.    Right Ventricle: Normal right ventricular cavity size. Wall thickness   is normal. Systolic function is normal.    Left Atrium: Left atrium is severely dilated.    Right Atrium: Right atrium is dilated.    Aortic Valve: The aortic valve is a trileaflet valve. There is moderate   aortic valve sclerosis. Mildly restricted motion.    Tricuspid Valve: There is mild regurgitation.    IVC/SVC: Normal venous pressure at 3 mmHg.        EF   Date Value Ref Range Status   02/09/2022 65 % Final         Assessment/Plan:  Ca Beckwith" was seen today for pre-op exam.    Diagnoses and all orders for this visit:    Essential hypertension  Currently hypotensive with LE edema  Stop amlodipine and monitor BP at home    Chronic heart failure with preserved ejection fraction  Currently euvolemic with warm extremities  Continue lasix  Encourage weight loss and exercise    Coronary artery disease  CT chest independently reviewed with left coronary calcification  No anginal symptoms  Stable    Pure hypercholesterolemia  Continue statin    Atherosclerosis of aorta  No embolic events  Stable    Morbid obesity with BMI of 45.0-49.9, adult  Counseled on weight loss    Tobacco abuse  Counseled on tobacco cessation    Cerebral aneurysm  Currently not having anginal symptoms and had a negative LHC in 2021. Stress echo and SPECT likely will not add information due to body habitus and PET will not be available prior to scheduled urgent surgery. She is probably at a moderate risk for cardiovascular events in a low to intermediate cardiac risk surgery. Recommend proceeding without any further cardiac testing. Discussed risks and benefits with patients and she is in agreement.       Follow up in about 1 year (around 11/15/2025).      Ravi Nunn MD  PGY V Cardiology       "

## 2024-11-19 ENCOUNTER — ANESTHESIA EVENT (OUTPATIENT)
Dept: SURGERY | Facility: HOSPITAL | Age: 74
End: 2024-11-19
Payer: MEDICARE

## 2024-11-19 ENCOUNTER — TELEPHONE (OUTPATIENT)
Dept: NEUROSURGERY | Facility: CLINIC | Age: 74
End: 2024-11-19
Payer: MEDICARE

## 2024-11-20 ENCOUNTER — ANESTHESIA (OUTPATIENT)
Dept: SURGERY | Facility: HOSPITAL | Age: 74
End: 2024-11-20
Payer: MEDICARE

## 2024-11-20 ENCOUNTER — HOSPITAL ENCOUNTER (INPATIENT)
Facility: HOSPITAL | Age: 74
LOS: 26 days | Discharge: NURSING, REHAB OR LONG-TERM CARE | DRG: 025 | End: 2024-12-16
Attending: NEUROLOGICAL SURGERY | Admitting: NEUROLOGICAL SURGERY
Payer: MEDICARE

## 2024-11-20 DIAGNOSIS — I67.1 CEREBRAL ANEURYSM: Primary | ICD-10-CM

## 2024-11-20 DIAGNOSIS — I72.9 ANEURYSM: ICD-10-CM

## 2024-11-20 DIAGNOSIS — R00.0 TACHYCARDIA: ICD-10-CM

## 2024-11-20 DIAGNOSIS — E87.5 HYPERKALEMIA: ICD-10-CM

## 2024-11-20 PROBLEM — F17.200 TOBACCO DEPENDENCY: Status: ACTIVE | Noted: 2024-11-20

## 2024-11-20 LAB
ABO + RH BLD: NORMAL
ANION GAP SERPL CALC-SCNC: 12 MMOL/L (ref 8–16)
BASOPHILS # BLD AUTO: 0.03 K/UL (ref 0–0.2)
BASOPHILS NFR BLD: 0.2 % (ref 0–1.9)
BLD GP AB SCN CELLS X3 SERPL QL: NORMAL
BUN SERPL-MCNC: 20 MG/DL (ref 8–23)
CALCIUM SERPL-MCNC: 8.8 MG/DL (ref 8.7–10.5)
CHLORIDE SERPL-SCNC: 107 MMOL/L (ref 95–110)
CO2 SERPL-SCNC: 19 MMOL/L (ref 23–29)
CREAT SERPL-MCNC: 0.9 MG/DL (ref 0.5–1.4)
DIFFERENTIAL METHOD BLD: ABNORMAL
EOSINOPHIL # BLD AUTO: 0 K/UL (ref 0–0.5)
EOSINOPHIL NFR BLD: 0 % (ref 0–8)
ERYTHROCYTE [DISTWIDTH] IN BLOOD BY AUTOMATED COUNT: 12.9 % (ref 11.5–14.5)
EST. GFR  (NO RACE VARIABLE): >60 ML/MIN/1.73 M^2
GLUCOSE SERPL-MCNC: 131 MG/DL (ref 70–110)
GLUCOSE SERPL-MCNC: 143 MG/DL (ref 70–110)
GLUCOSE SERPL-MCNC: 93 MG/DL (ref 70–110)
HCO3 UR-SCNC: 21.1 MMOL/L (ref 24–28)
HCO3 UR-SCNC: 23.9 MMOL/L (ref 24–28)
HCT VFR BLD AUTO: 33.3 % (ref 37–48.5)
HCT VFR BLD CALC: 31 %PCV (ref 36–54)
HCT VFR BLD CALC: 33 %PCV (ref 36–54)
HGB BLD-MCNC: 10.4 G/DL (ref 12–16)
IMM GRANULOCYTES # BLD AUTO: 0.13 K/UL (ref 0–0.04)
IMM GRANULOCYTES NFR BLD AUTO: 0.8 % (ref 0–0.5)
LYMPHOCYTES # BLD AUTO: 1 K/UL (ref 1–4.8)
LYMPHOCYTES NFR BLD: 5.9 % (ref 18–48)
MCH RBC QN AUTO: 32.5 PG (ref 27–31)
MCHC RBC AUTO-ENTMCNC: 31.2 G/DL (ref 32–36)
MCV RBC AUTO: 104 FL (ref 82–98)
MONOCYTES # BLD AUTO: 0.2 K/UL (ref 0.3–1)
MONOCYTES NFR BLD: 1.1 % (ref 4–15)
NEUTROPHILS # BLD AUTO: 15.3 K/UL (ref 1.8–7.7)
NEUTROPHILS NFR BLD: 92 % (ref 38–73)
NRBC BLD-RTO: 0 /100 WBC
PCO2 BLDA: 36.7 MMHG (ref 35–45)
PCO2 BLDA: 47.2 MMHG (ref 35–45)
PH SMN: 7.31 [PH] (ref 7.35–7.45)
PH SMN: 7.37 [PH] (ref 7.35–7.45)
PLATELET # BLD AUTO: 257 K/UL (ref 150–450)
PMV BLD AUTO: 10.1 FL (ref 9.2–12.9)
PO2 BLDA: 190 MMHG (ref 80–100)
PO2 BLDA: 89 MMHG (ref 80–100)
POC BE: -2 MMOL/L
POC BE: -4 MMOL/L
POC IONIZED CALCIUM: 1.1 MMOL/L (ref 1.06–1.42)
POC IONIZED CALCIUM: 1.23 MMOL/L (ref 1.06–1.42)
POC SATURATED O2: 100 % (ref 95–100)
POC SATURATED O2: 96 % (ref 95–100)
POC TCO2: 22 MMOL/L (ref 23–27)
POC TCO2: 25 MMOL/L (ref 23–27)
POCT GLUCOSE: 148 MG/DL (ref 70–110)
POTASSIUM BLD-SCNC: 3.4 MMOL/L (ref 3.5–5.1)
POTASSIUM BLD-SCNC: 3.6 MMOL/L (ref 3.5–5.1)
POTASSIUM SERPL-SCNC: 3.8 MMOL/L (ref 3.5–5.1)
RBC # BLD AUTO: 3.2 M/UL (ref 4–5.4)
SAMPLE: ABNORMAL
SAMPLE: ABNORMAL
SODIUM BLD-SCNC: 139 MMOL/L (ref 136–145)
SODIUM BLD-SCNC: 142 MMOL/L (ref 136–145)
SODIUM SERPL-SCNC: 138 MMOL/L (ref 136–145)
SPECIMEN OUTDATE: NORMAL
WBC # BLD AUTO: 16.65 K/UL (ref 3.9–12.7)

## 2024-11-20 PROCEDURE — 63600175 PHARM REV CODE 636 W HCPCS

## 2024-11-20 PROCEDURE — C1762 CONN TISS, HUMAN(INC FASCIA): HCPCS | Performed by: NEUROLOGICAL SURGERY

## 2024-11-20 PROCEDURE — 63600175 PHARM REV CODE 636 W HCPCS: Mod: JZ,JG | Performed by: NURSE ANESTHETIST, CERTIFIED REGISTERED

## 2024-11-20 PROCEDURE — 25000003 PHARM REV CODE 250

## 2024-11-20 PROCEDURE — 03VG0CZ RESTRICTION OF INTRACRANIAL ARTERY WITH EXTRALUMINAL DEVICE, OPEN APPROACH: ICD-10-PCS | Performed by: NEUROLOGICAL SURGERY

## 2024-11-20 PROCEDURE — 63600175 PHARM REV CODE 636 W HCPCS: Mod: HCNC

## 2024-11-20 PROCEDURE — 85025 COMPLETE CBC W/AUTO DIFF WBC: CPT

## 2024-11-20 PROCEDURE — 63600175 PHARM REV CODE 636 W HCPCS: Performed by: NURSE ANESTHETIST, CERTIFIED REGISTERED

## 2024-11-20 PROCEDURE — 27201423 OPTIME MED/SURG SUP & DEVICES STERILE SUPPLY: Performed by: NEUROLOGICAL SURGERY

## 2024-11-20 PROCEDURE — 20000000 HC ICU ROOM

## 2024-11-20 PROCEDURE — C1729 CATH, DRAINAGE: HCPCS | Performed by: NEUROLOGICAL SURGERY

## 2024-11-20 PROCEDURE — 99900035 HC TECH TIME PER 15 MIN (STAT)

## 2024-11-20 PROCEDURE — 27000221 HC OXYGEN, UP TO 24 HOURS

## 2024-11-20 PROCEDURE — 25000003 PHARM REV CODE 250: Performed by: NEUROLOGICAL SURGERY

## 2024-11-20 PROCEDURE — 61697 BRAIN ANEURYSM REPR COMPLX: CPT | Mod: 62,22,HCNC, | Performed by: NEUROLOGICAL SURGERY

## 2024-11-20 PROCEDURE — 36000710: Performed by: NEUROLOGICAL SURGERY

## 2024-11-20 PROCEDURE — 36620 INSERTION CATHETER ARTERY: CPT | Mod: 59,,, | Performed by: STUDENT IN AN ORGANIZED HEALTH CARE EDUCATION/TRAINING PROGRAM

## 2024-11-20 PROCEDURE — 27800903 OPTIME MED/SURG SUP & DEVICES OTHER IMPLANTS: Performed by: NEUROLOGICAL SURGERY

## 2024-11-20 PROCEDURE — P9045 ALBUMIN (HUMAN), 5%, 250 ML: HCPCS | Mod: JZ,JG | Performed by: NURSE ANESTHETIST, CERTIFIED REGISTERED

## 2024-11-20 PROCEDURE — 63600175 PHARM REV CODE 636 W HCPCS: Performed by: NEUROLOGICAL SURGERY

## 2024-11-20 PROCEDURE — 63600175 PHARM REV CODE 636 W HCPCS: Performed by: NURSE PRACTITIONER

## 2024-11-20 PROCEDURE — 25000003 PHARM REV CODE 250: Performed by: NURSE PRACTITIONER

## 2024-11-20 PROCEDURE — 69990 MICROSURGERY ADD-ON: CPT | Mod: HCNC,,, | Performed by: NEUROLOGICAL SURGERY

## 2024-11-20 PROCEDURE — 37000008 HC ANESTHESIA 1ST 15 MINUTES: Performed by: NEUROLOGICAL SURGERY

## 2024-11-20 PROCEDURE — 99291 CRITICAL CARE FIRST HOUR: CPT | Mod: ,,, | Performed by: NURSE PRACTITIONER

## 2024-11-20 PROCEDURE — 00U20JZ SUPPLEMENT DURA MATER WITH SYNTHETIC SUBSTITUTE, OPEN APPROACH: ICD-10-PCS | Performed by: NEUROLOGICAL SURGERY

## 2024-11-20 PROCEDURE — 86901 BLOOD TYPING SEROLOGIC RH(D): CPT

## 2024-11-20 PROCEDURE — 37000009 HC ANESTHESIA EA ADD 15 MINS: Performed by: NEUROLOGICAL SURGERY

## 2024-11-20 PROCEDURE — 80048 BASIC METABOLIC PNL TOTAL CA: CPT

## 2024-11-20 PROCEDURE — 36000711: Performed by: NEUROLOGICAL SURGERY

## 2024-11-20 PROCEDURE — 86920 COMPATIBILITY TEST SPIN: CPT | Performed by: NEUROLOGICAL SURGERY

## 2024-11-20 PROCEDURE — C1713 ANCHOR/SCREW BN/BN,TIS/BN: HCPCS | Performed by: NEUROLOGICAL SURGERY

## 2024-11-20 PROCEDURE — 25500020 PHARM REV CODE 255: Performed by: NEUROLOGICAL SURGERY

## 2024-11-20 PROCEDURE — 94761 N-INVAS EAR/PLS OXIMETRY MLT: CPT

## 2024-11-20 DEVICE — DURA MATRIX ONLAY PLUS 3X3: Type: IMPLANTABLE DEVICE | Site: CRANIAL | Status: FUNCTIONAL

## 2024-11-20 DEVICE — PLATE BONE 6 HOLE DBL Y SHAPE: Type: IMPLANTABLE DEVICE | Site: CRANIAL | Status: FUNCTIONAL

## 2024-11-20 DEVICE — SCREW UN3 AXS SD 1.5X4MM: Type: IMPLANTABLE DEVICE | Site: CRANIAL | Status: FUNCTIONAL

## 2024-11-20 DEVICE — IMPLANTABLE DEVICE: Type: IMPLANTABLE DEVICE | Site: CRANIAL | Status: FUNCTIONAL

## 2024-11-20 DEVICE — PLATE BONE BUR HOLE COVER 10MM: Type: IMPLANTABLE DEVICE | Site: CRANIAL | Status: FUNCTIONAL

## 2024-11-20 DEVICE — PLATE BONE 2X2 HOLE SM BOX: Type: IMPLANTABLE DEVICE | Site: CRANIAL | Status: FUNCTIONAL

## 2024-11-20 RX ORDER — PHENYLEPHRINE HCL IN 0.9% NACL 1 MG/10 ML
SYRINGE (ML) INTRAVENOUS
Status: DISCONTINUED | OUTPATIENT
Start: 2024-11-20 | End: 2024-11-20

## 2024-11-20 RX ORDER — LIDOCAINE HYDROCHLORIDE AND EPINEPHRINE 10; 10 UG/ML; MG/ML
INJECTION, SOLUTION INFILTRATION; PERINEURAL
Status: DISCONTINUED | OUTPATIENT
Start: 2024-11-20 | End: 2024-11-20 | Stop reason: HOSPADM

## 2024-11-20 RX ORDER — FUROSEMIDE 10 MG/ML
INJECTION INTRAMUSCULAR; INTRAVENOUS
Status: DISCONTINUED | OUTPATIENT
Start: 2024-11-20 | End: 2024-11-20

## 2024-11-20 RX ORDER — LEVETIRACETAM 500 MG/5ML
500 INJECTION, SOLUTION, CONCENTRATE INTRAVENOUS EVERY 12 HOURS
Status: DISCONTINUED | OUTPATIENT
Start: 2024-11-20 | End: 2024-11-21

## 2024-11-20 RX ORDER — HEPARIN SODIUM 5000 [USP'U]/ML
5000 INJECTION, SOLUTION INTRAVENOUS; SUBCUTANEOUS EVERY 8 HOURS
Status: DISCONTINUED | OUTPATIENT
Start: 2024-11-22 | End: 2024-11-21

## 2024-11-20 RX ORDER — LEVETIRACETAM 500 MG/5ML
INJECTION, SOLUTION, CONCENTRATE INTRAVENOUS
Status: DISCONTINUED | OUTPATIENT
Start: 2024-11-20 | End: 2024-11-20

## 2024-11-20 RX ORDER — CEFTRIAXONE 2 G/1
2 INJECTION, POWDER, FOR SOLUTION INTRAMUSCULAR; INTRAVENOUS
Status: COMPLETED | OUTPATIENT
Start: 2024-11-20 | End: 2024-11-20

## 2024-11-20 RX ORDER — INDOCYANINE GREEN AND WATER 25 MG
25 KIT INJECTION ONCE
Status: DISCONTINUED | OUTPATIENT
Start: 2024-11-20 | End: 2024-11-22

## 2024-11-20 RX ORDER — LISINOPRIL 10 MG/1
40 TABLET ORAL DAILY
Status: CANCELLED | OUTPATIENT
Start: 2024-11-21

## 2024-11-20 RX ORDER — CEFAZOLIN 2 G/1
2 INJECTION, POWDER, FOR SOLUTION INTRAMUSCULAR; INTRAVENOUS
Status: COMPLETED | OUTPATIENT
Start: 2024-11-20 | End: 2024-11-25

## 2024-11-20 RX ORDER — BACITRACIN ZINC 500 UNIT/G
OINTMENT (GRAM) TOPICAL
Status: DISCONTINUED | OUTPATIENT
Start: 2024-11-20 | End: 2024-11-20 | Stop reason: HOSPADM

## 2024-11-20 RX ORDER — PHENYLEPHRINE HYDROCHLORIDE 10 MG/ML
INJECTION INTRAVENOUS
Status: DISCONTINUED | OUTPATIENT
Start: 2024-11-20 | End: 2024-11-20

## 2024-11-20 RX ORDER — GABAPENTIN 300 MG/1
600 CAPSULE ORAL 3 TIMES DAILY
Status: CANCELLED | OUTPATIENT
Start: 2024-11-20

## 2024-11-20 RX ORDER — INDOCYANINE GREEN AND WATER 25 MG
2.5 KIT INJECTION
Status: COMPLETED | OUTPATIENT
Start: 2024-11-20 | End: 2024-11-20

## 2024-11-20 RX ORDER — FENTANYL CITRATE 50 UG/ML
INJECTION, SOLUTION INTRAMUSCULAR; INTRAVENOUS
Status: DISCONTINUED | OUTPATIENT
Start: 2024-11-20 | End: 2024-11-20

## 2024-11-20 RX ORDER — ACETAMINOPHEN 325 MG/1
650 TABLET ORAL EVERY 4 HOURS PRN
Status: DISCONTINUED | OUTPATIENT
Start: 2024-11-20 | End: 2024-11-22

## 2024-11-20 RX ORDER — LABETALOL HYDROCHLORIDE 5 MG/ML
INJECTION, SOLUTION INTRAVENOUS
Status: DISCONTINUED | OUTPATIENT
Start: 2024-11-20 | End: 2024-11-20

## 2024-11-20 RX ORDER — SODIUM,POTASSIUM PHOSPHATES 280-250MG
2 POWDER IN PACKET (EA) ORAL
Status: DISCONTINUED | OUTPATIENT
Start: 2024-11-20 | End: 2024-11-22

## 2024-11-20 RX ORDER — PAPAVERINE HYDROCHLORIDE 30 MG/ML
INJECTION INTRAMUSCULAR; INTRAVENOUS
Status: DISCONTINUED | OUTPATIENT
Start: 2024-11-20 | End: 2024-11-20 | Stop reason: HOSPADM

## 2024-11-20 RX ORDER — LANOLIN ALCOHOL/MO/W.PET/CERES
800 CREAM (GRAM) TOPICAL
Status: DISCONTINUED | OUTPATIENT
Start: 2024-11-20 | End: 2024-11-22

## 2024-11-20 RX ORDER — NICARDIPINE HYDROCHLORIDE 2.5 MG/ML
INJECTION INTRAVENOUS
Status: DISCONTINUED | OUTPATIENT
Start: 2024-11-20 | End: 2024-11-20

## 2024-11-20 RX ORDER — MORPHINE SULFATE 2 MG/ML
2 INJECTION, SOLUTION INTRAMUSCULAR; INTRAVENOUS EVERY 6 HOURS PRN
Status: DISCONTINUED | OUTPATIENT
Start: 2024-11-20 | End: 2024-12-16 | Stop reason: HOSPADM

## 2024-11-20 RX ORDER — HYDRALAZINE HYDROCHLORIDE 20 MG/ML
10 INJECTION INTRAMUSCULAR; INTRAVENOUS EVERY 6 HOURS PRN
Status: DISCONTINUED | OUTPATIENT
Start: 2024-11-20 | End: 2024-11-21

## 2024-11-20 RX ORDER — AMOXICILLIN 250 MG
1 CAPSULE ORAL DAILY
Status: DISCONTINUED | OUTPATIENT
Start: 2024-11-21 | End: 2024-11-22

## 2024-11-20 RX ORDER — DEXAMETHASONE SODIUM PHOSPHATE 4 MG/ML
4 INJECTION, SOLUTION INTRA-ARTICULAR; INTRALESIONAL; INTRAMUSCULAR; INTRAVENOUS; SOFT TISSUE EVERY 6 HOURS
Status: COMPLETED | OUTPATIENT
Start: 2024-11-21 | End: 2024-11-21

## 2024-11-20 RX ORDER — PROPOFOL 10 MG/ML
VIAL (ML) INTRAVENOUS
Status: DISCONTINUED | OUTPATIENT
Start: 2024-11-20 | End: 2024-11-20

## 2024-11-20 RX ORDER — MUPIROCIN 20 MG/G
1 OINTMENT TOPICAL 2 TIMES DAILY
Status: DISCONTINUED | OUTPATIENT
Start: 2024-11-20 | End: 2024-11-20 | Stop reason: HOSPADM

## 2024-11-20 RX ORDER — MUPIROCIN 20 MG/G
OINTMENT TOPICAL 2 TIMES DAILY
Status: COMPLETED | OUTPATIENT
Start: 2024-11-20 | End: 2024-11-25

## 2024-11-20 RX ORDER — DEXAMETHASONE SODIUM PHOSPHATE 4 MG/ML
INJECTION, SOLUTION INTRA-ARTICULAR; INTRALESIONAL; INTRAMUSCULAR; INTRAVENOUS; SOFT TISSUE
Status: DISCONTINUED | OUTPATIENT
Start: 2024-11-20 | End: 2024-11-20

## 2024-11-20 RX ORDER — VASOPRESSIN 20 [USP'U]/ML
INJECTION, SOLUTION INTRAMUSCULAR; SUBCUTANEOUS
Status: DISCONTINUED | OUTPATIENT
Start: 2024-11-20 | End: 2024-11-20

## 2024-11-20 RX ORDER — BISACODYL 10 MG/1
10 SUPPOSITORY RECTAL DAILY PRN
Status: DISCONTINUED | OUTPATIENT
Start: 2024-11-20 | End: 2024-12-16 | Stop reason: HOSPADM

## 2024-11-20 RX ORDER — ONDANSETRON HYDROCHLORIDE 2 MG/ML
INJECTION, SOLUTION INTRAVENOUS
Status: DISCONTINUED | OUTPATIENT
Start: 2024-11-20 | End: 2024-11-20

## 2024-11-20 RX ORDER — NICARDIPINE HYDROCHLORIDE 0.2 MG/ML
INJECTION INTRAVENOUS CONTINUOUS PRN
Status: DISCONTINUED | OUTPATIENT
Start: 2024-11-20 | End: 2024-11-20

## 2024-11-20 RX ORDER — NALOXONE HCL 0.4 MG/ML
0.4 VIAL (ML) INJECTION
Status: DISCONTINUED | OUTPATIENT
Start: 2024-11-20 | End: 2024-12-16 | Stop reason: HOSPADM

## 2024-11-20 RX ORDER — NICARDIPINE HYDROCHLORIDE 0.2 MG/ML
0-15 INJECTION INTRAVENOUS CONTINUOUS
Status: DISCONTINUED | OUTPATIENT
Start: 2024-11-20 | End: 2024-11-21

## 2024-11-20 RX ORDER — MANNITOL 20 G/100ML
INJECTION, SOLUTION INTRAVENOUS
Status: DISCONTINUED | OUTPATIENT
Start: 2024-11-20 | End: 2024-11-20

## 2024-11-20 RX ORDER — HYDROMORPHONE HYDROCHLORIDE 1 MG/ML
INJECTION, SOLUTION INTRAMUSCULAR; INTRAVENOUS; SUBCUTANEOUS
Status: DISCONTINUED | OUTPATIENT
Start: 2024-11-20 | End: 2024-11-20

## 2024-11-20 RX ORDER — IBUPROFEN 200 MG
1 TABLET ORAL DAILY
Status: CANCELLED | OUTPATIENT
Start: 2024-11-20

## 2024-11-20 RX ORDER — CITALOPRAM 10 MG/1
10 TABLET ORAL DAILY
Status: CANCELLED | OUTPATIENT
Start: 2024-11-21

## 2024-11-20 RX ORDER — ATORVASTATIN CALCIUM 10 MG/1
20 TABLET, FILM COATED ORAL DAILY
Status: CANCELLED | OUTPATIENT
Start: 2024-11-21

## 2024-11-20 RX ORDER — OXYCODONE HYDROCHLORIDE 5 MG/1
5 TABLET ORAL EVERY 6 HOURS PRN
Status: DISCONTINUED | OUTPATIENT
Start: 2024-11-20 | End: 2024-11-22

## 2024-11-20 RX ORDER — PANTOPRAZOLE SODIUM 40 MG/1
40 TABLET, DELAYED RELEASE ORAL DAILY
Status: CANCELLED | OUTPATIENT
Start: 2024-11-21

## 2024-11-20 RX ORDER — MUPIROCIN 20 MG/G
OINTMENT TOPICAL
Status: DISCONTINUED | OUTPATIENT
Start: 2024-11-20 | End: 2024-11-20 | Stop reason: HOSPADM

## 2024-11-20 RX ORDER — ALBUMIN HUMAN 50 G/1000ML
SOLUTION INTRAVENOUS
Status: DISCONTINUED | OUTPATIENT
Start: 2024-11-20 | End: 2024-11-20

## 2024-11-20 RX ORDER — SUCCINYLCHOLINE CHLORIDE 20 MG/ML
INJECTION INTRAMUSCULAR; INTRAVENOUS
Status: DISCONTINUED | OUTPATIENT
Start: 2024-11-20 | End: 2024-11-20

## 2024-11-20 RX ORDER — LIDOCAINE HYDROCHLORIDE 20 MG/ML
INJECTION, SOLUTION EPIDURAL; INFILTRATION; INTRACAUDAL; PERINEURAL
Status: DISCONTINUED | OUTPATIENT
Start: 2024-11-20 | End: 2024-11-20

## 2024-11-20 RX ORDER — ONDANSETRON HYDROCHLORIDE 2 MG/ML
4 INJECTION, SOLUTION INTRAVENOUS EVERY 12 HOURS PRN
Status: DISCONTINUED | OUTPATIENT
Start: 2024-11-20 | End: 2024-12-16 | Stop reason: HOSPADM

## 2024-11-20 RX ORDER — SODIUM CHLORIDE 0.9 % (FLUSH) 0.9 %
10 SYRINGE (ML) INJECTION
Status: DISCONTINUED | OUTPATIENT
Start: 2024-11-20 | End: 2024-12-16 | Stop reason: HOSPADM

## 2024-11-20 RX ORDER — SODIUM CHLORIDE 9 MG/ML
INJECTION, SOLUTION INTRAVENOUS CONTINUOUS
Status: DISCONTINUED | OUTPATIENT
Start: 2024-11-20 | End: 2024-11-25

## 2024-11-20 RX ORDER — CEFTRIAXONE 1 G/1
INJECTION, POWDER, FOR SOLUTION INTRAMUSCULAR; INTRAVENOUS
Status: DISCONTINUED | OUTPATIENT
Start: 2024-11-20 | End: 2024-11-20 | Stop reason: HOSPADM

## 2024-11-20 RX ADMIN — MUPIROCIN: 20 OINTMENT TOPICAL at 09:11

## 2024-11-20 RX ADMIN — MUPIROCIN: 20 OINTMENT TOPICAL at 07:11

## 2024-11-20 RX ADMIN — GLYCOPYRROLATE 0.1 MG: 0.2 INJECTION, SOLUTION INTRAMUSCULAR; INTRAVENOUS at 06:11

## 2024-11-20 RX ADMIN — PHENYLEPHRINE HYDROCHLORIDE 50 MCG: 10 INJECTION INTRAVENOUS at 04:11

## 2024-11-20 RX ADMIN — IOHEXOL 75 ML: 350 INJECTION, SOLUTION INTRAVENOUS at 09:11

## 2024-11-20 RX ADMIN — PHENYLEPHRINE HYDROCHLORIDE 50 MCG: 10 INJECTION INTRAVENOUS at 06:11

## 2024-11-20 RX ADMIN — CEFTRIAXONE SODIUM 2 G: 1 INJECTION, POWDER, FOR SOLUTION INTRAMUSCULAR; INTRAVENOUS at 01:11

## 2024-11-20 RX ADMIN — NICARDIPINE HYDROCHLORIDE 100 MCG: 25 INJECTION INTRAVENOUS at 04:11

## 2024-11-20 RX ADMIN — GLYCOPYRROLATE 0.2 MG: 0.2 INJECTION, SOLUTION INTRAMUSCULAR; INTRAVENOUS at 04:11

## 2024-11-20 RX ADMIN — ALBUMIN (HUMAN) 100 ML: 12.5 SOLUTION INTRAVENOUS at 05:11

## 2024-11-20 RX ADMIN — ALBUMIN (HUMAN) 100 ML: 12.5 SOLUTION INTRAVENOUS at 06:11

## 2024-11-20 RX ADMIN — SODIUM CHLORIDE 0.2 MCG/KG/MIN: 9 INJECTION, SOLUTION INTRAVENOUS at 12:11

## 2024-11-20 RX ADMIN — Medication 160 MG: at 12:11

## 2024-11-20 RX ADMIN — LEVETIRACETAM 500 MG: 100 INJECTION INTRAVENOUS at 09:11

## 2024-11-20 RX ADMIN — SODIUM CHLORIDE, SODIUM GLUCONATE, SODIUM ACETATE, POTASSIUM CHLORIDE, MAGNESIUM CHLORIDE, SODIUM PHOSPHATE, DIBASIC, AND POTASSIUM PHOSPHATE: .53; .5; .37; .037; .03; .012; .00082 INJECTION, SOLUTION INTRAVENOUS at 04:11

## 2024-11-20 RX ADMIN — LIDOCAINE HYDROCHLORIDE 60 MG: 20 INJECTION, SOLUTION EPIDURAL; INFILTRATION; INTRACAUDAL; PERINEURAL at 12:11

## 2024-11-20 RX ADMIN — Medication 100 MCG: at 01:11

## 2024-11-20 RX ADMIN — SODIUM CHLORIDE, SODIUM GLUCONATE, SODIUM ACETATE, POTASSIUM CHLORIDE, MAGNESIUM CHLORIDE, SODIUM PHOSPHATE, DIBASIC, AND POTASSIUM PHOSPHATE: .53; .5; .37; .037; .03; .012; .00082 INJECTION, SOLUTION INTRAVENOUS at 11:11

## 2024-11-20 RX ADMIN — LEVETIRACETAM 1000 MG: 100 INJECTION INTRAVENOUS at 02:11

## 2024-11-20 RX ADMIN — SUCCINYLCHOLINE 100 MG: 20 INJECTION, SOLUTION INTRAMUSCULAR; INTRAVENOUS at 12:11

## 2024-11-20 RX ADMIN — DEXAMETHASONE SODIUM PHOSPHATE 4 MG: 4 INJECTION INTRA-ARTICULAR; INTRALESIONAL; INTRAMUSCULAR; INTRAVENOUS; SOFT TISSUE at 11:11

## 2024-11-20 RX ADMIN — NICARDIPINE HYDROCHLORIDE 2.5 MG/HR: 0.2 INJECTION, SOLUTION INTRAVENOUS at 08:11

## 2024-11-20 RX ADMIN — FENTANYL CITRATE 25 MCG: 50 INJECTION, SOLUTION INTRAMUSCULAR; INTRAVENOUS at 07:11

## 2024-11-20 RX ADMIN — MANNITOL 50 G: 20 INJECTION, SOLUTION INTRAVENOUS at 02:11

## 2024-11-20 RX ADMIN — DEXAMETHASONE SODIUM PHOSPHATE 8 MG: 4 INJECTION INTRA-ARTICULAR; INTRALESIONAL; INTRAMUSCULAR; INTRAVENOUS; SOFT TISSUE at 01:11

## 2024-11-20 RX ADMIN — PHENYLEPHRINE HYDROCHLORIDE 0.25 MCG/KG/MIN: 10 INJECTION INTRAVENOUS at 12:11

## 2024-11-20 RX ADMIN — HYDROMORPHONE HYDROCHLORIDE 0.5 MG: 1 INJECTION, SOLUTION INTRAMUSCULAR; INTRAVENOUS; SUBCUTANEOUS at 08:11

## 2024-11-20 RX ADMIN — SODIUM CHLORIDE: 0.9 INJECTION, SOLUTION INTRAVENOUS at 11:11

## 2024-11-20 RX ADMIN — FENTANYL CITRATE 50 MCG: 50 INJECTION, SOLUTION INTRAMUSCULAR; INTRAVENOUS at 12:11

## 2024-11-20 RX ADMIN — FENTANYL CITRATE 25 MCG: 50 INJECTION, SOLUTION INTRAMUSCULAR; INTRAVENOUS at 08:11

## 2024-11-20 RX ADMIN — CEFAZOLIN 2 G: 2 INJECTION, POWDER, FOR SOLUTION INTRAMUSCULAR; INTRAVENOUS at 09:11

## 2024-11-20 RX ADMIN — LABETALOL HYDROCHLORIDE 5 MG: 5 INJECTION, SOLUTION INTRAVENOUS at 08:11

## 2024-11-20 RX ADMIN — VASOPRESSIN 1 UNITS: 20 INJECTION INTRAVENOUS at 04:11

## 2024-11-20 RX ADMIN — FUROSEMIDE 20 MG: 10 INJECTION, SOLUTION INTRAMUSCULAR; INTRAVENOUS at 02:11

## 2024-11-20 RX ADMIN — NICARDIPINE HYDROCHLORIDE 5 MG/HR: 0.2 INJECTION, SOLUTION INTRAVENOUS at 04:11

## 2024-11-20 RX ADMIN — Medication 50 MG: at 04:11

## 2024-11-20 RX ADMIN — PHENYLEPHRINE HYDROCHLORIDE 100 MCG: 10 INJECTION INTRAVENOUS at 04:11

## 2024-11-20 RX ADMIN — ONDANSETRON 4 MG: 2 INJECTION INTRAMUSCULAR; INTRAVENOUS at 08:11

## 2024-11-20 RX ADMIN — SODIUM CHLORIDE: 9 INJECTION, SOLUTION INTRAVENOUS at 11:11

## 2024-11-20 RX ADMIN — Medication 150 MCG/KG/MIN: at 12:11

## 2024-11-20 RX ADMIN — LABETALOL HYDROCHLORIDE 10 MG: 5 INJECTION, SOLUTION INTRAVENOUS at 08:11

## 2024-11-20 RX ADMIN — INDOCYANINE GREEN AND WATER 2.5 MG: KIT at 04:11

## 2024-11-20 NOTE — ANESTHESIA PROCEDURE NOTES
Intubation    Date/Time: 11/20/2024 12:10 PM    Performed by: Cindy Lee MD  Authorized by: Edward Brock MD    Intubation:     Induction:  Intravenous    Intubated:  Postinduction    Mask Ventilation:  Easy mask    Attempts:  1    Attempted By:  Resident anesthesiologist    Method of Intubation:  Direct    Blade:  Schultz 2    Laryngeal View Grade: Grade I - full view of cords      Difficult Airway Encountered?: No      Complications:  Dental trauma    Airway Device:  Oral endotracheal tube    Airway Device Size:  7.0    Style/Cuff Inflation:  Cuffed    Inflation Amount (mL):  8    Tube secured:  23    Secured at:  The lips    Placement Verified By:  Capnometry    Complicating Factors:  None    Findings Post-Intubation:  BS equal bilateral and atraumatic/condition of teeth unchanged

## 2024-11-20 NOTE — H&P
Please see prior clinic note from Dr. Simon. Patient remains neuro intact on exam, continues to have dizziness. No longer on eliquis. Uses cane and walker at baseline. Angiogram images reviewed with patient and daughter Lillian in preop, consent signed for left pterional craniotomy for MCA aneurysm clipping, possible L ant choroidal and ophthalmic aneursym clipping. Will not plan to treat the basilar tip anerusym today, planned for endovascular intervention. Site marked, will proceed to OR.     -------------------------------------------------------------------------------    Neurosurgery  Established Patient     Scribe Attestation  I, Myrna Pappas, attest that this documentation has been prepared under the direction and in the presence of Sejal Simon MD.     SUBJECTIVE:      History of Present Illness 02/06/24:  73-year-old female, referred to me by Dr. Staton, past medical history of hypertension, obstructive sleep apnea on CPAP, suggestive heart failure, obesity, history of gastric cancer status post chemoradiation and DVT in currently on Eliquis.  She also has a current smoker she was initially referred to Neurology for workup for dizziness. She had been complaining of vertigo multiple times a day for the past several months, associated with shortness of breath, palpitation, nausea, headache, and feeling faint. She felt like the room is spinning but at the same time denied any chest pain or loss of consciousness. She felt the episodes of normally when she was bending over standing up from sitting position, but they did occur to her while she was sitting as well. They did not occur when she was lying down in bed.  She patient noted that the dizziness resolved after sitting still for about 10-15 minutes. Denied any recent or remote trauma. No known history of stroke. She says she notes she feels dizziness much of the time she feels on the right temple. She notes some nausea, denies any headache. She has a  55 pack-year history of smoking. She occasionally drinks alcohol, denies any illicit drug use. Unclear of any family history of aneurysms or stroke. She believes he had a brother who may have had a stroke but he is long since passed away. This was a virtual audio visual visit.     Interval history 08/06/24:  Patient returns to clinic for 4-6 wk f/u after undergoing diagnostic cerebral angiogram revealing dysplastic basilar apex aneurysm, left anterior choroidal ICA aneurysm, left MCA bifurcation, and small left ophthalmic artery aneurysm. Today she reports feeling overall okay. No new acute changes or issues to report. Patient presents in a wheelchair. No family history of aneurysms. Smoking history positive (refer to above). Patient currently smokes. Endorses compliance with Eliquis.      Review of patient's allergies indicates:  No Known Allergies     Current Medications          Current Outpatient Medications   Medication Sig Dispense Refill    acetaminophen (TYLENOL) 500 MG tablet Take by mouth daily as needed.        amLODIPine (NORVASC) 10 MG tablet Take 1 tablet (10 mg total) by mouth once daily. 90 tablet 11    apixaban (ELIQUIS) 5 mg Tab Take 1 tablet (5 mg total) by mouth 2 (two) times daily. 180 tablet 11    atorvastatin (LIPITOR) 20 MG tablet Take 1 tablet (20 mg total) by mouth once daily. 90 tablet 11    B-complex with vitamin C (Z-BEC OR EQUIV) tablet Take 1 tablet by mouth once daily.         CALCIUM CITRATE-VITAMIN D2 ORAL Take 1 tablet by mouth once daily.         citalopram (CELEXA) 10 MG tablet Take 1 tablet (10 mg total) by mouth once daily. 90 tablet 11    dextran 70-hypromellose (TEARS) ophthalmic solution Place 1 drop into the right eye every 6 (six) hours. 30 mL 0    ferrous gluconate (FERGON) 324 MG tablet TAKE 1 TABLET (324 MG TOTAL) BY MOUTH DAILY WITH BREAKFAST. 90 tablet 3    furosemide (LASIX) 80 MG tablet Take 1 tablet (80 mg total) by mouth 2 (two) times daily. 180 tablet 11     gabapentin (NEURONTIN) 300 MG capsule Take 2 capsules (600 mg total) by mouth 3 (three) times daily. 540 capsule 2    LIDOcaine-prilocaine (EMLA) cream Apply topically as needed (prior to port access). 30 g 0    lisinopriL (PRINIVIL,ZESTRIL) 40 MG tablet Take 1 tablet (40 mg total) by mouth once daily. 90 tablet 11    multivitamin with minerals tablet Take 1 tablet by mouth once daily.         oxyCODONE-acetaminophen (PERCOCET) 5-325 mg per tablet Take 1 tablet by mouth every 8 (eight) hours as needed for Pain. 90 tablet 0    pantoprazole (PROTONIX) 40 MG tablet Take 1 tablet (40 mg total) by mouth once daily. 90 tablet 11    semaglutide (OZEMPIC) 1 mg/dose (4 mg/3 mL) Inject 1 mg into the skin every 7 days. 9 each 11      No current facility-administered medications for this visit.                Facility-Administered Medications Ordered in Other Visits   Medication Dose Route Frequency Provider Last Rate Last Admin    0.9%  NaCl infusion   Intravenous Continuous Tevin Clark MD 0 mL/hr at 01/13/22 1055 New Bag at 03/05/24 1000    0.9%  NaCl infusion   Intravenous Continuous Kevin Carballo MD        sodium chloride 0.9% flush 10 mL  10 mL Intravenous PRN Kevin Carballo MD                     Past Medical History:   Diagnosis Date    YOUNG (acute kidney injury) 10/15/2021    Anemia       2018    Arthritis      BMI 60.0-69.9, adult      Cataract      Chronic diastolic congestive heart failure 01/27/2021    Deep vein thrombosis      Depression      Dry eye syndrome      DVT of deep femoral vein, right 05/29/2023    Gastric adenocarcinoma 05/25/2021    GERD (gastroesophageal reflux disease)      Glaucoma suspect      History of gastric ulcer      History of psychiatric hospitalization      Hx of psychiatric care       Celexa, no recall of charted Effexor, Lexapro, Desyrel prescriptions    Hyperlipidemia      Hypertension      Hypothyroidism      Morbid obesity      Neuromuscular disorder      Sleep apnea       Thyroid disease              Past Surgical History:   Procedure Laterality Date    APPENDECTOMY        CARDIAC SURGERY        CATARACT EXTRACTION W/  INTRAOCULAR LENS IMPLANT Bilateral       MFIOL OU    CORONARY ANGIOGRAPHY Bilateral 02/24/2021     Procedure: ANGIOGRAM, CORONARY ARTERY;  Surgeon: Cresencio Ridley MD;  Location: [facility] CATH LAB;  Service: Cardiology;  Laterality: Bilateral;  Covid test needed - ok per Danay MORENOU. Pt. w/o transportation or family    ENDOSCOPIC ULTRASOUND OF UPPER GASTROINTESTINAL TRACT N/A 03/17/2021     Procedure: ULTRASOUND, UPPER GI TRACT, ENDOSCOPIC;  Surgeon: Gerald Anaya MD;  Location: [facility] (2ND FLR);  Service: Endoscopy;  Laterality: N/A;  Pt unable to get a ride for swab. Will do rapid test at 8:30 - ttr    ENDOSCOPIC ULTRASOUND OF UPPER GASTROINTESTINAL TRACT N/A 01/13/2022     Procedure: ULTRASOUND, UPPER GI TRACT, ENDOSCOPIC;  Surgeon: Kevin Carballo MD;  Location: [facility] ([address] [place] [address]);  Service: Endoscopy;  Laterality: N/A;  blood thinner approval received, see telephone encounter 1/7/22-BB  rapid  instructions given verbally and sent to address on file in pt's chart-BB    ENDOSCOPIC ULTRASOUND OF UPPER GASTROINTESTINAL TRACT N/A 10/11/2022     Procedure: ULTRASOUND, UPPER GI TRACT, ENDOSCOPIC;  Surgeon: Dequan Ridley MD;  Location: [facility] (2ND FLR);  Service: Endoscopy;  Laterality: N/A;    ENDOSCOPIC ULTRASOUND OF UPPER GASTROINTESTINAL TRACT N/A 1/24/2024     Procedure: ULTRASOUND, UPPER GI TRACT, ENDOSCOPIC;  Surgeon: Kevin Carballo MD;  [facility]  Service: Endoscopy;  Laterality: N/A;    ENDOSCOPIC ULTRASOUND OF UPPER GASTROINTESTINAL TRACT N/A 3/5/2024     Procedure: ULTRASOUND, UPPER GI TRACT, ENDOSCOPIC;  Surgeon: Kevin Carballo MD;  Location: [facility] SAM (2ND FLR);  Service: Endoscopy;  Laterality: N/A;  1/25 portal instr-Repeat theEUS at the next available appointment because the preparation was poor. 48hrs of  liquid. Ozempic 7 day hold-eliquis approved Dr. Pelaez TE 12/12/2023-tt  2/28-precall complete-pt verbalized udnerstanding of holding Oz    ESOPHAGOGASTRODUODENOSCOPY N/A 02/05/2021     Procedure: EGD (ESOPHAGOGASTRODUODENOSCOPY);  Surgeon: Matthew Hernadez MD;  Location: [facility] ENDO (2ND FLR);  Service: Endoscopy;  Laterality: N/A;  BMI-58    Wt: 361#     COVID test at W on 2/2-GT    ESOPHAGOGASTRODUODENOSCOPY N/A 03/17/2021     Procedure: EGD (ESOPHAGOGASTRODUODENOSCOPY);  Surgeon: Gerald Anaya MD;  Location: [facility] ENDO (2ND FLR);  Service: Endoscopy;  Laterality: N/A;    ESOPHAGOGASTRODUODENOSCOPY N/A 05/25/2021     Procedure: EGD (ESOPHAGOGASTRODUODENOSCOPY);  Surgeon: Kevin Carballo MD;  Location: [facility] ENDO (2ND FLR);  Service: Endoscopy;  Laterality: N/A;  ESD 2 hours  Full COVID vaccination on file. ttr    ESOPHAGOGASTRODUODENOSCOPY N/A 01/13/2022     Procedure: EGD (ESOPHAGOGASTRODUODENOSCOPY);  Surgeon: Kevin Carballo MD;  Location: [facility] ENDO (2ND FLR);  Service: Endoscopy;  Laterality: N/A;  blood thinner approval, see telephone encounter 1/7/22-BB  rapid  instructions given verbally and sent to address on file in pt's chart-BB    ESOPHAGOGASTRODUODENOSCOPY N/A 10/11/2022     Procedure: EGD (ESOPHAGOGASTRODUODENOSCOPY);  Surgeon: Dequan Ridley MD;  Location: [facility] ENDO (2ND FLR);  Service: Endoscopy;  Laterality: N/A;  She is do for EGD/EUS now. Personal history of gastric cancer. Ca Nikhilemmett #[id number].   Thanks,   Kevin Fuentes MD     Pt is fully vaccinated-DS  9/14/22-Approval to hold Eliquis rec'd from Dr. Pelaez (see telephone encounter 9/14/22)-DS  9/14/22-Ins    ESOPHAGOGASTRODUODENOSCOPY N/A 1/24/2024     Procedure: EGD (ESOPHAGOGASTRODUODENOSCOPY);  Surgeon: Kevin Carballo MD;  Location: [facility] ([address] [place] [address]);  Service: Endoscopy;  Laterality: N/A;  12/8/23: instructions sent via portal. eliquis approval from MD Pelaez in telephone encounter (12/12/23) -GD  1/9-precall complete-MS     ESOPHAGOGASTRODUODENOSCOPY N/A 3/5/2024     Procedure: EGD (ESOPHAGOGASTRODUODENOSCOPY);  Surgeon: Kevin Carballo MD;  Location: [address]);  Service: Endoscopy;  Laterality: N/A;    EYE SURGERY        HYSTEROSCOPIC POLYPECTOMY OF UTERUS   1/10/2024     Procedure: POLYPECTOMY, UTERUS, HYSTEROSCOPIC;  Surgeon: Pina Pickens MD;  Location: [facility] OR;  Service: OB/GYN;;    HYSTEROSCOPY WITH DILATION AND CURETTAGE OF UTERUS N/A 1/10/2024     Procedure: HYSTEROSCOPY, WITH DILATION AND CURETTAGE OF UTERUS;  Surgeon: Pina Pickens MD;  Location: [facility] OR;  Service: OB/GYN;  Laterality: N/A;    INJECTION OF ANESTHETIC AGENT AROUND NERVE Left 07/10/2018     Procedure: BLOCK, NERVE;  Surgeon: Samantha Vidal MD;  Location: [facility] PAIN MGT;  Service: Pain Management;  Laterality: Left;  Genicular     INJECTION OF ANESTHETIC AGENT AROUND NERVE Left 07/19/2019     Procedure: Block, Nerve NERVE BLOCK GENICULAR WITH PHENOL 6%;  Surgeon: Samantha Vidal MD;  Location: [facility] PAIN MGT;  Service: Pain Management;  Laterality: Left;  NEEDS CONSENT    INSERTION OF TUNNELED CENTRAL VENOUS CATHETER (CVC) WITH SUBCUTANEOUS PORT N/A 07/09/2021     Procedure: INSERTION, PORT-A-CATH;  Surgeon: Mario Paz MD;  Location: [facility] CATH LAB;  Service: Radiology;  Laterality: N/A;  PORT PLACEMENT    RADIOFREQUENCY ABLATION Left 06/19/2020     Procedure: RADIOFREQUENCY ABLATION LEFT GENICULAR;  Surgeon: Tevin Clark MD;  Location: [facility] PAIN MGT;  Service: Pain Management;  Laterality: Left;  Left Genicular RFA    RADIOFREQUENCY ABLATION Left 01/22/2021     Procedure: RADIOFREQUENCY ABLATION LEFT GENICULAR;  Surgeon: Tevin Clark MD;  Location: [facility] PAIN MGT;  Service: Pain Management;  Laterality: Left;    RADIOFREQUENCY ABLATION Left 07/30/2021     Procedure: RADIOFREQUENCY ABLATION, GENICULAR COOLED NEED CONSENT;  Surgeon: Tevin Clark MD;  Location: [facility] PAIN MGT;  Service: Pain Management;  Laterality:  Left;    RADIOFREQUENCY ABLATION Left 2022     Procedure: RADIOFREQUENCY ABLATION, GENICULAR COOLED , LEFT;  Surgeon: Tevin Clark MD;  Location: [facility];  Service: Pain Management;  Laterality: Left;    RADIOFREQUENCY ABLATION Left 2022     Procedure: RADIOFREQUENCY ABLATION LEFT GENICULAR COOLED CLEARED TO HOLD ELIQUIS 3 DAYS  NEEDS CONSENT;  Surgeon: Tevin Clark MD;  Location: [facility] PAIN MGT;  Service: Pain Management;  Laterality: Left;    TONSILLECTOMY          Family History         Problem Relation (Age of Onset)     No Known Problems Mother, Father             Social History            Socioeconomic History    Marital status: Single    Number of children: 2   Occupational History       Comment: retired  and cook   Tobacco Use    Smoking status: Former       Current packs/day: 0.00       Average packs/day: 1 pack/day for 53.9 years (53.7 ttl pk-yrs)       Types: Cigarettes       Start date:        Quit date: 2023       Years since quittin.6    Smokeless tobacco: Never    Tobacco comments:       currently smoking <5 cigarettes most days   Substance and Sexual Activity    Alcohol use: Yes       Alcohol/week: 1.0 standard drink of alcohol       Types: 1 Glasses of wine per week       Comment: rarely    Drug use: No    Sexual activity: Not Currently       Partners: Male   Social History Narrative     2 children (dtr in area, son in [place]) and she has 3 grandkids (in [place]),     lives alone. Prev  and julianna     Originally from [facility]      Social Determinants of Health           Financial Resource Strain: Medium Risk (10/31/2023)     Overall Financial Resource Strain (CARDIA)      Difficulty of Paying Living Expenses: Somewhat hard   Food Insecurity: No Food Insecurity (10/31/2023)     Hunger Vital Sign      Worried About Running Out of Food in the Last Year: Never true      Ran Out of Food in the Last Year: Never true   Transportation Needs: Unmet  Transportation Needs (10/31/2023)     PRAPARE - Transportation      Lack of Transportation (Medical): Yes      Lack of Transportation (Non-Medical): Yes   Physical Activity: Inactive (10/31/2023)     Exercise Vital Sign      Days of Exercise per Week: 0 days      Minutes of Exercise per Session: 0 min   Stress: Stress Concern Present (10/31/2023)     [place] [place] [employer] - Occupational Stress Questionnaire      Feeling of Stress : To some extent   Housing Stability: Low Risk  (10/31/2023)     Housing Stability Vital Sign      Unable to Pay for Housing in the Last Year: No      Number of Places Lived in the Last Year: 1      Unstable Housing in the Last Year: No         Review of Systems   All other systems reviewed and are negative.     OBJECTIVE:      Vital Signs  There is no height or weight on file to calculate BMI.     Neurosurgery Physical Exam  General: no acute distress  Head: Non-traumatic, normocephalic  Eyes: Pupils equal, EOMI  Neck: Supple, normal ROM, no tenderness to palpation  CVS: Normal rate and rhythm, distal pulses present  Pulm: Symmetric expansion, no respiratory distress  GI: Abdomen nondistended, nontender     MSK: Moves all extremities without restriction, atraumatic  Skin: Dry, intact  Psych: Normal thought content and cognition     Neuro:  Alert, awake, oriented, to self, place, time  Language:  Speech is fluent, goal directed without any noted dysarthria or aphasia     Cranial nerves:    CNII-XII: Intact on fine exam,   Pupils equal round react to light,   Extraocular muscles are intact  V1 to V3 is intact to light touch,   no facial asymmetry,   Hearing is intact to finger rub and voice  tongue/uvula/palate midline,   shoulder shrug equal,      No pronator drift     Extremities:  Motor:           Upper Extremity     Deltoid Triceps Biceps Wrist  Extension Wrist  Flexion Interosseous   R 5/5 5/5 5/5 5/5 5/5 5/5   L 5/5 5/5 5/5 5/5 5/5 5/5         Thumb   Abduction  Thumb  ADDuction Finger  Flexion Finger  Extension       R 5/5 5/5 5/5 5/5       L 5/5 5/5 5/5 5/5           Lower Extremity      Iliopsoas Quadriceps Hamstring Plantarflexion Dorsiflexion EHL   R 5/5 5/5 5/5 5/5 5/5 5/5   L 5/5 5/5 5/5 5/5 5/5 5/5         Reflexes:     DTR:    2+ biceps                       2+ triceps              2+ brachioradialis              2+ patellar  2+ Achilles     Rob's: Negative     Babinski's: Negative     Clonus: Negative     Sensory:      Sensation intact to light touch, temperature sensation, vibration, pinprick     Coordination:      Coordination intact throughout, no dysmetria, normal rapid alternating movements, no dysdiadochokinesia  Cerebellar:  Normal finger-to-nose, normal heel-to-shin  Cervical spine:  No midline cervical tenderness, negative Lhermitte, negative Spurling  Thoracic spine:  No midline thoracic tenderness  Lumbar spine:  No midline lumbar tenderness      Diagnostic Results:  I personally reviewed the patient's Diagnostic Imaging.      IR Angiogram Carotid Cerebral Bilateral 06/06/24  There is highly dysplastic basilar apex aneurysm, measuring neck 4.92 mm, height 7.68 m and width as 9.58 mm.     Left anterior choroidal ICA aneurysm measuring as neck 2.6 x 5 mm and height as 6.34 mm, image body with of 4.22 mm.     Left MCA Bifurcation measuring neck 7.29 mm x height 7.6 a mm and width as 9.23 mm.     Small left ophthalmic artery aneurysm, 2.3 mm base by 1.7 mm height     CTA Head & Neck 03/17/24  CTA head: Redemonstration of 3 intracranial aneurysms involving left anterior choroidal ICA, left M2 MCA, and the basilar tip overall unchanged.     CTA neck: No significant arterial stenosis throughout the neck.     CT head: No evidence for acute intracranial hemorrhage or definite abnormal parenchymal enhancement.     MRA Brain W WO Cont 03/17/24  MRA head: Globular left M2 MCA aneurysm, left anterior choroidal aneurysm, and basilar tip aneurysms again seen  relatively stable     Vessel wall imaging: Please note there is abnormal wall enhancement associated with the left anterior choroidal aneurysm as well as a more focal punctate area of a questioned abnormal wall enhancement associated with the left MCA aneurysm     Wall enhancement associated with the basilar tip aneurysm.       ASSESSMENT/PLAN:   73-year-old female with 55 pack-year history of smoking and several aneurysms (dysplastic basilar apex aneurysm, left anterior choroidal ICA aneurysm, left MCA bifurcation, and small left ophthalmic artery aneurysm). Abnormal wall enhancement associated with the left anterior choroidal ICA aneurysm, left MCA aneurysm, and basilar tip aneurysm.     Patient presents with no focal neurological deficits here in clinic.     Discussed imaging results of angiogram revealing dysplastic basilar apex aneurysm, left anterior choroidal ICA inferiorly pointing aneurysm, left MCA bifurcation, and small left ophthalmic artery aneurysm.     Discussed imaging results of CTA Head and Neck revealing 6 mm basilar apex aneurysm, 7 mm left anterior choroidal artery aneurysm, 12 mm left MCA bifurcation aneurysm. No significant arterial stenosis throughout the neck.     Discussed imaging results of MRA Brain revealing globular left M2 MCA aneurysm, left anterior choroidal ICA aneurysm and basilar apex aneurysms, relatively stable. Noted abnormal wall enhancement associated with the left anterior choroidal ICA aneurysm as well as a more focal punctate area of abnormal wall enhancement associated with the left MCA aneurysm. Wall enhancement associated with the basilar apex aneurysm as well.     After discussion with the patient, I recommend surgical treatment of all aneurysms. I discussed this case with my partner, Dr. Shankar Gannon, and I believe it would be best to first treat the left MCA aneurysm , given the aneurysms morphology, and low likelihood for a suitable endovascular treatment.  Given the  proximity while treating the left MCA aneurysm with open microsurgery it would be prudent to then additionally treat the left anterior choroidal as well as the left ophthalmic , simultaneously.   Then followed by endovascular surgical treatment of the basilar aneurysm, with likely WEB, vs stent assisted coil embolization.  And did note to the patient, that she does have some slightly elevated surgical risk given her age and comorbidity.  However given the much higher risk of complications with the MCA aneurysms with endovascular treatment it is more appropriate for microsurgical clip ligation.     I have discussed the risks/benefits, indications, and alternatives for the proposed procedure in detail. The patient and I also discussed the importance of smoking cessation d/t the negative effects it has on the patient's condition and post-operative recovery. Patient will f/u with her PCP for surgery clearance. I have answered all of their questions and patient wish to proceed with this plan. We will schedule patient.       Thank you so very much for allowing me to participate in the care of this patient.  Please feel free to call any questions, comments, or concerns.     Sejal Simon MD,MSc  Department of Neurosurgery   Department of Radiology  Department of Neurology  [facility]  [facility]    [facility]   [employer] [facility]

## 2024-11-20 NOTE — ANESTHESIA PREPROCEDURE EVALUATION
"                                                                                                             [facility]  Anesthesia Pre-Operative Evaluation         Patient Name: Ca Coats  YOB: 1950  MRN: [id number]    SUBJECTIVE:     Pre-operative evaluation for Procedure(s) (LRB):  CRANIOTOMY, WITH ANEURYSM CLIPPING W/ STEALTH (Left)  CRANIOTOMY, FOR ANEURYSM OF VERTEBROBASILAR OR CAROTID CIRCULATION (Left)  DISSECTION, NERVE, USING OPERATING MICROSCOPE (Left)     11/19/2024    Ca Coats is a 73 y.o. female w/ a significant PMHx of HTN, tobacco use, morbid obesity, LAURA (cpap), gastric cancer (s/p chemo), dvt (eliquis), tobacco use, diastolic CHF, now with several cerebral aneurysms.    Cardiology note 11/15/24  "Currently not having anginal symptoms and had a negative LHC in 2021. Stress echo and SPECT likely will not add information due to body habitus and PET will not be available prior to scheduled urgent surgery. She is probably at a moderate risk for cardiovascular events in a low to intermediate cardiac risk surgery. Recommend proceeding without any further cardiac testing. Discussed risks and benefits with patients and she is in agreement. "    TTE 10/22/23    Left Ventricle: The left ventricle is normal in size. Normal wall thickness. There is normal systolic function with a visually estimated ejection fraction of 60 - 65%. There is normal diastolic function.    Right Ventricle: Normal right ventricular cavity size. Wall thickness is normal. Systolic function is normal.    Left Atrium: Left atrium is severely dilated.    Right Atrium: Right atrium is dilated.    Aortic Valve: The aortic valve is a trileaflet valve. There is moderate aortic valve sclerosis. Mildly restricted motion.    Tricuspid Valve: There is mild regurgitation.    IVC/SVC: Normal venous pressure at 3 mmHg.    Patient now presents for the above procedure(s).      LDA: None " documented.    Prev airway:     Intubation     Date/Time: 10/3/2024 10:24 AM     Performed by: Lombard, Jeffrey C., CRNA  Authorized by: Joceline Woods MD    Intubation:     Induction:  Inhalational - mask    Intubated:  Postinduction    Mask Ventilation:  Easy with oral airway    Attempts:  1    Attempted By:  VICENTE    Blade:  Hernandez 3    Laryngeal View Grade: Grade I - full view of cords      Difficult Airway Encountered?: No      Complications:  None    Airway Device:  Direct    Airway Device Size:  7.0    Style/Cuff Inflation:  Cuffed (inflated to minimal occlusive pressure)    Tube secured:  20    Secured at:  The lips    Placement Verified By:  Auscultation    Complicating Factors:  None    Findings Post-Intubation:  Bilateral breath sounds, positive ETCO2 and atraumatic / condition of teeth unchanged          Drips: None documented.    Patient Active Problem List   Diagnosis    Primary osteoarthritis of left knee    Obstructive sleep apnea    Chronic knee pain    Left knee DJD    Essential hypertension    Major depressive disorder    Morbid obesity with BMI of 45.0-49.9, adult    Joint pain    Gait instability    At high risk for injury related to fall    Debility    Hypothyroidism    Left knee pain    Osteopenia of neck of left femur    Gastroesophageal reflux disease    Chronic diastolic congestive heart failure    Pure hypercholesterolemia    Tobacco abuse    Abnormal cardiovascular stress test    Gastric adenocarcinoma    Iron deficiency anemia secondary to blood loss (chronic)    Chronic pain with opioid dependence    History of DVT (deep vein thrombosis)    Current mild episode of major depressive disorder without prior episode    DVT of deep femoral vein, right    Atherosclerosis of aorta    Nervous    Anemia    Edema    Postural dizziness    Constipation    History of Clostridium difficile infection    Opioid use    Impaired functional mobility, balance, gait, and endurance    Decreased range of  motion of left knee    Muscle weakness    s/p hscope/D&C/polypectomy    Dizziness    Neuropathy    Pulmonary nodules/lesions, multiple    Hypoxia    Cerebral aneurysm    Coronary artery disease involving native coronary artery of native heart without angina pectoris       Review of patient's allergies indicates:  No Known Allergies    Current Outpatient Medications:  No current facility-administered medications for this encounter.    Current Outpatient Medications:     acetaminophen (TYLENOL) 500 MG tablet, Take by mouth daily as needed., Disp: , Rfl:     atorvastatin (LIPITOR) 20 MG tablet, Take 1 tablet (20 mg total) by mouth once daily., Disp: 90 tablet, Rfl: 11    B-complex with vitamin C (Z-BEC OR EQUIV) tablet, Take 1 tablet by mouth once daily. , Disp: , Rfl:     CALCIUM CITRATE-VITAMIN D2 ORAL, Take 1 tablet by mouth once daily. , Disp: , Rfl:     citalopram (CELEXA) 10 MG tablet, Take 1 tablet (10 mg total) by mouth once daily., Disp: 90 tablet, Rfl: 11    furosemide (LASIX) 80 MG tablet, Take 1 tablet (80 mg total) by mouth 2 (two) times daily., Disp: 180 tablet, Rfl: 11    gabapentin (NEURONTIN) 300 MG capsule, Take 2 capsules (600 mg total) by mouth 3 (three) times daily., Disp: 540 capsule, Rfl: 2    lisinopriL (PRINIVIL,ZESTRIL) 40 MG tablet, Take 1 tablet (40 mg total) by mouth once daily., Disp: 90 tablet, Rfl: 11    multivitamin with minerals tablet, Take 1 tablet by mouth once daily. , Disp: , Rfl:     oxyCODONE-acetaminophen (PERCOCET) 5-325 mg per tablet, Take 1 tablet by mouth every 8 (eight) hours as needed for Pain., Disp: 90 tablet, Rfl: 0    oxyCODONE-acetaminophen (PERCOCET) 5-325 mg per tablet, Take 1 tablet by mouth every 8 (eight) hours as needed for Pain., Disp: 90 tablet, Rfl: 0    pantoprazole (PROTONIX) 40 MG tablet, Take 1 tablet (40 mg total) by mouth once daily., Disp: 90 tablet, Rfl: 11    semaglutide (OZEMPIC) 1 mg/dose (4 mg/3 mL), Inject 1 mg into the skin every 7 days., Disp:  9 each, Rfl: 11    Facility-Administered Medications Ordered in Other Encounters:     0.9%  NaCl infusion, , Intravenous, Continuous, Tevin Clark MD, Last Rate: 0 mL/hr at 01/13/22 1055, New Bag at 03/05/24 1000    0.9%  NaCl infusion, , Intravenous, Continuous, Kevin Carballo MD    sodium chloride 0.9% flush 10 mL, 10 mL, Intravenous, PRN, Kevin Carballo MD    Past Surgical History:   Procedure Laterality Date    APPENDECTOMY      CATARACT EXTRACTION W/  INTRAOCULAR LENS IMPLANT Bilateral     MFIOL OU    CORONARY ANGIOGRAPHY Bilateral 02/24/2021    Procedure: ANGIOGRAM, CORONARY ARTERY;  Surgeon: Cresencio Ridley MD;  Location: [facility] CATH LAB;  Service: Cardiology;  Laterality: Bilateral;  Covid test needed - ok per Danay SSCU. Pt. w/o transportation or family    ENDOSCOPIC ULTRASOUND OF UPPER GASTROINTESTINAL TRACT N/A 03/17/2021    Procedure: ULTRASOUND, UPPER GI TRACT, ENDOSCOPIC;  Surgeon: Gerald Anaya MD;  Location: [facility] ([facility]);  Service: Endoscopy;  Laterality: N/A;  Pt unable to get a ride for swab. Will do rapid test at 8:30 - ttr    ENDOSCOPIC ULTRASOUND OF UPPER GASTROINTESTINAL TRACT N/A 01/13/2022    Procedure: ULTRASOUND, UPPER GI TRACT, ENDOSCOPIC;  Surgeon: Kevin Carballo MD;  Location: [facility] (2ND FLR);  Service: Endoscopy;  Laterality: N/A;  blood thinner approval received, see telephone encounter 1/7/22-BB  rapid  instructions given verbally and sent to address on file in pt's chart-BB    ENDOSCOPIC ULTRASOUND OF UPPER GASTROINTESTINAL TRACT N/A 10/11/2022    Procedure: ULTRASOUND, UPPER GI TRACT, ENDOSCOPIC;  Surgeon: Dequan Ridley MD;  Location: [facility] ENDO (2ND FLR);  Service: Endoscopy;  Laterality: N/A;    ENDOSCOPIC ULTRASOUND OF UPPER GASTROINTESTINAL TRACT N/A 01/24/2024    Procedure: ULTRASOUND, UPPER GI TRACT, ENDOSCOPIC;  Surgeon: Kevin Carballo MD;  Location: [facility] ENDO (2ND FLR);  Service: Endoscopy;  Laterality: N/A;    ENDOSCOPIC ULTRASOUND OF  UPPER GASTROINTESTINAL TRACT N/A 03/05/2024    Procedure: ULTRASOUND, UPPER GI TRACT, ENDOSCOPIC;  Surgeon: Kevin Carballo MD;  Location: [facility] SAM (2ND FLR);  Service: Endoscopy;  Laterality: N/A;  1/25 portal instr-Repeat theEUS at the next available appointment because the preparation was poor. 48hrs of liquid. Ozempic 7 day hold-eliquis approved Dr. Pelaez TE 12/12/2023-tt  2/28-precall complete-pt verbalized udnerstanding of holding Oz    ESOPHAGOGASTRODUODENOSCOPY N/A 02/05/2021    Procedure: EGD (ESOPHAGOGASTRODUODENOSCOPY);  Surgeon: Matthew Hernadez MD;  Location: [facility] SAM (2ND FLR);  Service: Endoscopy;  Laterality: N/A;  BMI-58    Wt: 361#     COVID test at [facility] on 2/2-GT    ESOPHAGOGASTRODUODENOSCOPY N/A 03/17/2021    Procedure: EGD (ESOPHAGOGASTRODUODENOSCOPY);  Surgeon: Gerald Anaya MD;  Location: [facility] SAM (2ND FLR);  Service: Endoscopy;  Laterality: N/A;    ESOPHAGOGASTRODUODENOSCOPY N/A 05/25/2021    Procedure: EGD (ESOPHAGOGASTRODUODENOSCOPY);  Surgeon: Kevin Carballo MD;  Location: [facility] (2ND FLR);  Service: Endoscopy;  Laterality: N/A;  ESD 2 hours  Full COVID vaccination on file. ttr    ESOPHAGOGASTRODUODENOSCOPY N/A 01/13/2022    Procedure: EGD (ESOPHAGOGASTRODUODENOSCOPY);  Surgeon: Kevin Carballo MD;  Location: [facility] SAM (2ND FLR);  Service: Endoscopy;  Laterality: N/A;  blood thinner approval, see telephone encounter 1/7/22-BB  rapid  instructions given verbally and sent to address on file in pt's chart-BB    ESOPHAGOGASTRODUODENOSCOPY N/A 10/11/2022    Procedure: EGD (ESOPHAGOGASTRODUODENOSCOPY);  Surgeon: Dequan Ridley MD;  Location: [facility] SAM (2ND FLR);  Service: Endoscopy;  Laterality: N/A;  She is do for EGD/EUS now. Personal history of gastric cancer. Ca Coats #[id number].   Thanks,   Kevin Carballo. MD    Pt is fully vaccinated-DS  9/14/22-Approval to hold Eliquis rec'd from Dr. Pelaez (see telephone encounter 9/14/22)-DS  9/14/22-Ins     ESOPHAGOGASTRODUODENOSCOPY N/A 01/24/2024    Procedure: EGD (ESOPHAGOGASTRODUODENOSCOPY);  Surgeon: Kevin Carballo MD;  Location: [facility] ENDO (2ND FLR);  Service: Endoscopy;  Laterality: N/A;  12/8/23: instructions sent via portal. eliquis approval from MD Pelaez in telephone encounter (12/12/23) -GD  1/9-precall complete-MS    ESOPHAGOGASTRODUODENOSCOPY N/A 03/05/2024    Procedure: EGD (ESOPHAGOGASTRODUODENOSCOPY);  Surgeon: Kevin Carballo MD;  Location: [facility] ENDO (2ND FLR);  Service: Endoscopy;  Laterality: N/A;    EYE SURGERY      HYSTEROSCOPIC POLYPECTOMY OF UTERUS  01/10/2024    Procedure: POLYPECTOMY, UTERUS, HYSTEROSCOPIC;  Surgeon: Pina Pickens MD;  Location: [facility] OR;  Service: OB/GYN;;    HYSTEROSCOPY WITH DILATION AND CURETTAGE OF UTERUS N/A 01/10/2024    Procedure: HYSTEROSCOPY, WITH DILATION AND CURETTAGE OF UTERUS;  Surgeon: Pina Pickens MD;  Location: [facility] OR;  Service: OB/GYN;  Laterality: N/A;    INJECTION OF ANESTHETIC AGENT AROUND NERVE Left 07/10/2018    Procedure: BLOCK, NERVE;  Surgeon: Samantha Vidal MD;  Location: [facility] PAIN MGT;  Service: Pain Management;  Laterality: Left;  Genicular     INJECTION OF ANESTHETIC AGENT AROUND NERVE Left 07/19/2019    Procedure: Block, Nerve NERVE BLOCK GENICULAR WITH PHENOL 6%;  Surgeon: Samantha Vidal MD;  Location: [facility] PAIN MGT;  Service: Pain Management;  Laterality: Left;  NEEDS CONSENT    INSERTION OF TUNNELED CENTRAL VENOUS CATHETER (CVC) WITH SUBCUTANEOUS PORT N/A 07/09/2021    Procedure: INSERTION, PORT-A-CATH;  Surgeon: Mario Paz MD;  Location: [facility] CATH LAB;  Service: Radiology;  Laterality: N/A;  PORT PLACEMENT    RADIOFREQUENCY ABLATION Left 06/19/2020    Procedure: RADIOFREQUENCY ABLATION LEFT GENICULAR;  Surgeon: Tevin Clark MD;  Location: [facility] PAIN MGT;  Service: Pain Management;  Laterality: Left;  Left Genicular RFA    RADIOFREQUENCY ABLATION Left 01/22/2021    Procedure: RADIOFREQUENCY ABLATION LEFT  GENICULAR;  Surgeon: Tevin Clark MD;  Location: [facility] PAIN MGT;  Service: Pain Management;  Laterality: Left;    RADIOFREQUENCY ABLATION Left 2021    Procedure: RADIOFREQUENCY ABLATION, GENICULAR COOLED NEED CONSENT;  Surgeon: Tevin Clark MD;  Location: [facility] PAIN MGT;  Service: Pain Management;  Laterality: Left;    RADIOFREQUENCY ABLATION Left 2022    Procedure: RADIOFREQUENCY ABLATION, GENICULAR COOLED , LEFT;  Surgeon: Tevin Clark MD;  Location: [facility] PAIN MGT;  Service: Pain Management;  Laterality: Left;    RADIOFREQUENCY ABLATION Left 2022    Procedure: RADIOFREQUENCY ABLATION LEFT GENICULAR COOLED CLEARED TO HOLD ELIQUIS 3 DAYS  NEEDS CONSENT;  Surgeon: Tevin Clark MD;  Location: [facility] PAIN MGT;  Service: Pain Management;  Laterality: Left;    TONSILLECTOMY         Social History     Socioeconomic History    Marital status: Single    Number of children: 2   Occupational History     Comment: retired  and cook   Tobacco Use    Smoking status: Some Days     Current packs/day: 0.00     Average packs/day: 1 pack/day for 53.9 years (53.7 ttl pk-yrs)     Types: Cigarettes     Start date:      Last attempt to quit: 2023     Years since quittin.9    Smokeless tobacco: Never    Tobacco comments:     currently smoking <5 cigarettes most days   Substance and Sexual Activity    Alcohol use: Yes     Alcohol/week: 1.0 standard drink of alcohol     Types: 1 Glasses of wine per week     Comment: rarely    Drug use: No    Sexual activity: Not Currently     Partners: Male   Social History Narrative    2 children (dtr in area, son in [place]) and she has 3 grandkids (in [place]),    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    Originally from [facility] [place]     Social Drivers of Health     Financial Resource Strain: Medium Risk (10/11/2024)    Overall Financial Resource Strain (CARDIA)     Difficulty of Paying Living Expenses: Somewhat hard   Food Insecurity: Food Insecurity  Present (10/11/2024)    Hunger Vital Sign     Worried About Running Out of Food in the Last Year: Sometimes true     Ran Out of Food in the Last Year: Never true   Transportation Needs: Unmet Transportation Needs (10/31/2023)    PRAPARE - Transportation     Lack of Transportation (Medical): Yes     Lack of Transportation (Non-Medical): Yes   Physical Activity: Unknown (10/11/2024)    Exercise Vital Sign     Days of Exercise per Week: 5 days   Recent Concern: Physical Activity - Inactive (10/11/2024)    Exercise Vital Sign     Days of Exercise per Week: 5 days     Minutes of Exercise per Session: 0 min   Stress: Stress Concern Present (10/11/2024)    [employer] - Occupational Stress Questionnaire     Feeling of Stress : Very much   Housing Stability: Low Risk  (10/31/2023)    Housing Stability Vital Sign     Unable to Pay for Housing in the Last Year: No     Number of Places Lived in the Last Year: 1     Unstable Housing in the Last Year: No       OBJECTIVE:     Vital Signs Range (Last 24H):         Significant Labs:  Lab Results   Component Value Date    WBC 12.05 10/22/2024    HGB 11.7 (L) 10/22/2024    HCT 38.4 10/22/2024     10/22/2024    CHOL 86 (L) 12/03/2020    TRIG 80 12/03/2020    HDL 39 (L) 12/03/2020    ALT 15 10/22/2024    AST 15 10/22/2024     10/22/2024    K 4.6 10/22/2024     10/22/2024    CREATININE 1.0 10/22/2024    BUN 23 10/22/2024    CO2 27 10/22/2024    TSH 0.280 (L) 01/18/2024    INR 0.9 10/14/2024    HGBA1C 4.4 01/18/2024       Diagnostic Studies: No relevant studies.    EKG:   Results for orders placed or performed in visit on 10/14/24   IN OFFICE EKG 12-LEAD (to [place])    Collection Time: 10/14/24  1:56 PM   Result Value Ref Range    QRS Duration 82 ms    OHS QTC Calculation 452 ms    Narrative    Test Reason : Z01.810,Z01.818,    Vent. Rate : 069 BPM     Atrial Rate : 069 BPM     P-R Int : 204 ms          QRS Dur : 082 ms      QT Int : 422 ms        P-R-T Axes : 046 -29 009 degrees     QTc Int : 452 ms    Normal sinus rhythm with sinus arrhythmia    Possible Anterior infarct (cited on or before 14-OCT-2024)  Abnormal ECG  When compared with ECG of 18-JAN-2024 12:56,  No significant change was found  Confirmed by XI HUDDLESTON MD ([id number]) on 10/15/2024 8:42:41 AM    Referred By: ALPHONSE CRUZ           Confirmed By:XI HUDDLESTON MD       2D ECHO:  TTE:  No results found. However, due to the size of the patient record, not all encounters were searched. Please check Results Review for a complete set of results.    SUPA:  No results found. However, due to the size of the patient record, not all encounters were searched. Please check Results Review for a complete set of results.    ASSESSMENT/PLAN:           Pre-op Assessment    I have reviewed the Patient Summary Reports.     I have reviewed the Nursing Notes. I have reviewed the NPO Status.   I have reviewed the Medications.     Review of Systems  Anesthesia Hx:  No problems with previous Anesthesia   History of prior surgery of interest to airway management or planning:          Denies Family Hx of Anesthesia complications.    Denies Personal Hx of Anesthesia complications.                    Hematology/Oncology:                      Current/Recent Cancer.                Cardiovascular:  Exercise tolerance: poor   Denies Pacemaker. Hypertension    Denies CABG/stent.    CHF    hyperlipidemia   ECG has been reviewed.                            Pulmonary:     Denies Asthma.    Sleep Apnea                Renal/:  Chronic Renal Disease                Hepatic/GI:  Hepatic/GI Normal                    Musculoskeletal:  Arthritis               Neurological:    Neuromuscular Disease,       aneurysms                            Endocrine:  Endocrine Normal          Morbid Obesity / BMI > 40  Psych:  Psychiatric History                  Physical Exam  General: Alert and Oriented    Airway:  Mallampati: III   Mouth  Opening: Normal  TM Distance: Normal  Tongue: Normal    Dental:  Edentulous    Chest/Lungs:  Normal Respiratory Rate    Heart:  Rate: Normal        Anesthesia Plan  Type of Anesthesia, risks & benefits discussed:    Anesthesia Type: Gen ETT  Intra-op Monitoring Plan: Standard ASA Monitors, Art Line and Central Line  Post Op Pain Control Plan: multimodal analgesia and IV/PO Opioids PRN  Induction:  IV  Airway Plan: Direct, Post-Induction  Informed Consent: Informed consent signed with the Patient and all parties understand the risks and agree with anesthesia plan.  All questions answered.   ASA Score: 3  Day of Surgery Review of History & Physical: H&P Update referred to the surgeon/provider.    Ready For Surgery From Anesthesia Perspective.     .

## 2024-11-20 NOTE — ANESTHESIA PROCEDURE NOTES
Arterial    Diagnosis: cerebral aneurysm    Patient location during procedure: done in OR    Staffing  Authorizing Provider: Edward Brock MD  Performing Provider: Cindy Lee MD    Staffing  Performed by: Cindy Lee MD  Authorized by: Edward Brock MD    Anesthesiologist was present at the time of the procedure.    Preanesthetic Checklist  Completed: patient identified, IV checked, site marked, risks and benefits discussed, surgical consent, monitors and equipment checked, pre-op evaluation, timeout performed and anesthesia consent givenArterial  Skin Prep: chlorhexidine gluconate  Local Infiltration: lidocaine  Orientation: left  Location: radial    Catheter Size: 20 G  Catheter placement by Ultrasound guidance. Heme positive aspiration all ports.   Vessel Caliber: medium, patent, compressibility normal  Needle advanced into vessel with real time Ultrasound guidance.Insertion Attempts: 1  Assessment  Dressing: secured with tape and tegaderm  Patient: Tolerated well

## 2024-11-21 ENCOUNTER — TELEPHONE (OUTPATIENT)
Dept: NEUROSURGERY | Facility: CLINIC | Age: 74
End: 2024-11-21
Payer: MEDICARE

## 2024-11-21 DIAGNOSIS — I67.1 CEREBRAL ANEURYSM, NONRUPTURED: Primary | ICD-10-CM

## 2024-11-21 LAB
ALBUMIN SERPL BCP-MCNC: 2.9 G/DL (ref 3.5–5.2)
ALBUMIN SERPL BCP-MCNC: 3.3 G/DL (ref 3.5–5.2)
ALBUMIN SERPL BCP-MCNC: 3.3 G/DL (ref 3.5–5.2)
ALLENS TEST: ABNORMAL
ALP SERPL-CCNC: 75 U/L (ref 40–150)
ALP SERPL-CCNC: 84 U/L (ref 40–150)
ALP SERPL-CCNC: 89 U/L (ref 40–150)
ALT SERPL W/O P-5'-P-CCNC: 11 U/L (ref 10–44)
ALT SERPL W/O P-5'-P-CCNC: 12 U/L (ref 10–44)
ALT SERPL W/O P-5'-P-CCNC: 9 U/L (ref 10–44)
ANION GAP SERPL CALC-SCNC: 10 MMOL/L (ref 8–16)
ANION GAP SERPL CALC-SCNC: 12 MMOL/L (ref 8–16)
ANION GAP SERPL CALC-SCNC: 12 MMOL/L (ref 8–16)
APTT PPP: 24.8 SEC (ref 21–32)
APTT PPP: 25.4 SEC (ref 21–32)
AST SERPL-CCNC: 19 U/L (ref 10–40)
AST SERPL-CCNC: 28 U/L (ref 10–40)
AST SERPL-CCNC: 33 U/L (ref 10–40)
BASOPHILS # BLD AUTO: 0.04 K/UL (ref 0–0.2)
BASOPHILS NFR BLD: 0.2 % (ref 0–1.9)
BILIRUB SERPL-MCNC: 0.4 MG/DL (ref 0.1–1)
BUN SERPL-MCNC: 11 MG/DL (ref 8–23)
BUN SERPL-MCNC: 12 MG/DL (ref 8–23)
BUN SERPL-MCNC: 16 MG/DL (ref 8–23)
CALCIUM SERPL-MCNC: 8.7 MG/DL (ref 8.7–10.5)
CALCIUM SERPL-MCNC: 8.9 MG/DL (ref 8.7–10.5)
CALCIUM SERPL-MCNC: 9.5 MG/DL (ref 8.7–10.5)
CHLORIDE SERPL-SCNC: 110 MMOL/L (ref 95–110)
CHLORIDE SERPL-SCNC: 114 MMOL/L (ref 95–110)
CHLORIDE SERPL-SCNC: 117 MMOL/L (ref 95–110)
CO2 SERPL-SCNC: 19 MMOL/L (ref 23–29)
CO2 SERPL-SCNC: 19 MMOL/L (ref 23–29)
CO2 SERPL-SCNC: 20 MMOL/L (ref 23–29)
CREAT SERPL-MCNC: 0.8 MG/DL (ref 0.5–1.4)
DELSYS: ABNORMAL
DIFFERENTIAL METHOD BLD: ABNORMAL
EOSINOPHIL # BLD AUTO: 0 K/UL (ref 0–0.5)
EOSINOPHIL NFR BLD: 0 % (ref 0–8)
ERYTHROCYTE [DISTWIDTH] IN BLOOD BY AUTOMATED COUNT: 12.5 % (ref 11.5–14.5)
EST. GFR  (NO RACE VARIABLE): >60 ML/MIN/1.73 M^2
FIBRINOGEN PPP-MCNC: 414 MG/DL (ref 182–400)
FLOW: 2
GLUCOSE SERPL-MCNC: 135 MG/DL (ref 70–110)
GLUCOSE SERPL-MCNC: 144 MG/DL (ref 70–110)
GLUCOSE SERPL-MCNC: 156 MG/DL (ref 70–110)
HCO3 UR-SCNC: 26 MMOL/L (ref 24–28)
HCT VFR BLD AUTO: 32 % (ref 37–48.5)
HGB BLD-MCNC: 10.4 G/DL (ref 12–16)
IMM GRANULOCYTES # BLD AUTO: 0.11 K/UL (ref 0–0.04)
IMM GRANULOCYTES NFR BLD AUTO: 0.5 % (ref 0–0.5)
INR PPP: 0.9 (ref 0.8–1.2)
INR PPP: 1 (ref 0.8–1.2)
LYMPHOCYTES # BLD AUTO: 1 K/UL (ref 1–4.8)
LYMPHOCYTES NFR BLD: 4.6 % (ref 18–48)
MAGNESIUM SERPL-MCNC: 1.7 MG/DL (ref 1.6–2.6)
MAGNESIUM SERPL-MCNC: 1.9 MG/DL (ref 1.6–2.6)
MAGNESIUM SERPL-MCNC: 1.9 MG/DL (ref 1.6–2.6)
MCH RBC QN AUTO: 32.4 PG (ref 27–31)
MCHC RBC AUTO-ENTMCNC: 32.5 G/DL (ref 32–36)
MCV RBC AUTO: 100 FL (ref 82–98)
MODE: ABNORMAL
MONOCYTES # BLD AUTO: 0.4 K/UL (ref 0.3–1)
MONOCYTES NFR BLD: 1.8 % (ref 4–15)
NEUTROPHILS # BLD AUTO: 19.7 K/UL (ref 1.8–7.7)
NEUTROPHILS NFR BLD: 92.9 % (ref 38–73)
NRBC BLD-RTO: 0 /100 WBC
OHS QRS DURATION: 78 MS
OHS QRS DURATION: 86 MS
OHS QTC CALCULATION: 421 MS
OHS QTC CALCULATION: 464 MS
PCO2 BLDA: 36.6 MMHG (ref 35–45)
PH SMN: 7.46 [PH] (ref 7.35–7.45)
PHOSPHATE SERPL-MCNC: 2.5 MG/DL (ref 2.7–4.5)
PHOSPHATE SERPL-MCNC: 2.6 MG/DL (ref 2.7–4.5)
PHOSPHATE SERPL-MCNC: 2.9 MG/DL (ref 2.7–4.5)
PLATELET # BLD AUTO: 246 K/UL (ref 150–450)
PMV BLD AUTO: 10.2 FL (ref 9.2–12.9)
PO2 BLDA: 73 MMHG (ref 80–100)
POC BE: 2 MMOL/L
POC SATURATED O2: 95 % (ref 95–100)
POC TCO2: 27 MMOL/L (ref 23–27)
POTASSIUM SERPL-SCNC: 3.2 MMOL/L (ref 3.5–5.1)
POTASSIUM SERPL-SCNC: 3.4 MMOL/L (ref 3.5–5.1)
POTASSIUM SERPL-SCNC: 3.4 MMOL/L (ref 3.5–5.1)
PROT SERPL-MCNC: 5.6 G/DL (ref 6–8.4)
PROT SERPL-MCNC: 6.5 G/DL (ref 6–8.4)
PROT SERPL-MCNC: 6.6 G/DL (ref 6–8.4)
PROTHROMBIN TIME: 10.4 SEC (ref 9–12.5)
PROTHROMBIN TIME: 10.6 SEC (ref 9–12.5)
RBC # BLD AUTO: 3.21 M/UL (ref 4–5.4)
SAMPLE: ABNORMAL
SITE: ABNORMAL
SODIUM SERPL-SCNC: 141 MMOL/L (ref 136–145)
SODIUM SERPL-SCNC: 146 MMOL/L (ref 136–145)
SODIUM SERPL-SCNC: 146 MMOL/L (ref 136–145)
WBC # BLD AUTO: 21.21 K/UL (ref 3.9–12.7)

## 2024-11-21 PROCEDURE — 63600175 PHARM REV CODE 636 W HCPCS

## 2024-11-21 PROCEDURE — 85384 FIBRINOGEN ACTIVITY: CPT

## 2024-11-21 PROCEDURE — 25000003 PHARM REV CODE 250

## 2024-11-21 PROCEDURE — 93005 ELECTROCARDIOGRAM TRACING: CPT

## 2024-11-21 PROCEDURE — 80053 COMPREHEN METABOLIC PANEL: CPT | Performed by: NURSE PRACTITIONER

## 2024-11-21 PROCEDURE — 85025 COMPLETE CBC W/AUTO DIFF WBC: CPT

## 2024-11-21 PROCEDURE — 63600175 PHARM REV CODE 636 W HCPCS: Performed by: NURSE PRACTITIONER

## 2024-11-21 PROCEDURE — 37799 UNLISTED PX VASCULAR SURGERY: CPT

## 2024-11-21 PROCEDURE — 20000000 HC ICU ROOM

## 2024-11-21 PROCEDURE — 93010 ELECTROCARDIOGRAM REPORT: CPT | Mod: ,,, | Performed by: INTERNAL MEDICINE

## 2024-11-21 PROCEDURE — 85610 PROTHROMBIN TIME: CPT | Mod: 91

## 2024-11-21 PROCEDURE — 84100 ASSAY OF PHOSPHORUS: CPT | Mod: 91 | Performed by: NURSE PRACTITIONER

## 2024-11-21 PROCEDURE — 80053 COMPREHEN METABOLIC PANEL: CPT | Mod: 91 | Performed by: NURSE PRACTITIONER

## 2024-11-21 PROCEDURE — 80053 COMPREHEN METABOLIC PANEL: CPT | Mod: 91

## 2024-11-21 PROCEDURE — 25000003 PHARM REV CODE 250: Performed by: NURSE PRACTITIONER

## 2024-11-21 PROCEDURE — 83735 ASSAY OF MAGNESIUM: CPT | Mod: 91 | Performed by: NURSE PRACTITIONER

## 2024-11-21 PROCEDURE — 95714 VEEG EA 12-26 HR UNMNTR: CPT

## 2024-11-21 PROCEDURE — 94761 N-INVAS EAR/PLS OXIMETRY MLT: CPT | Mod: XB

## 2024-11-21 PROCEDURE — 85730 THROMBOPLASTIN TIME PARTIAL: CPT | Mod: 91

## 2024-11-21 PROCEDURE — 85610 PROTHROMBIN TIME: CPT | Performed by: NURSE PRACTITIONER

## 2024-11-21 PROCEDURE — 83735 ASSAY OF MAGNESIUM: CPT | Mod: 91

## 2024-11-21 PROCEDURE — 99900035 HC TECH TIME PER 15 MIN (STAT)

## 2024-11-21 PROCEDURE — 99233 SBSQ HOSP IP/OBS HIGH 50: CPT | Mod: ,,, | Performed by: PSYCHIATRY & NEUROLOGY

## 2024-11-21 PROCEDURE — 85730 THROMBOPLASTIN TIME PARTIAL: CPT | Performed by: NURSE PRACTITIONER

## 2024-11-21 PROCEDURE — 99291 CRITICAL CARE FIRST HOUR: CPT | Mod: ,,, | Performed by: PSYCHIATRY & NEUROLOGY

## 2024-11-21 PROCEDURE — 84100 ASSAY OF PHOSPHORUS: CPT | Performed by: NURSE PRACTITIONER

## 2024-11-21 PROCEDURE — 25500020 PHARM REV CODE 255: Performed by: NEUROLOGICAL SURGERY

## 2024-11-21 PROCEDURE — 95700 EEG CONT REC W/VID EEG TECH: CPT

## 2024-11-21 PROCEDURE — 27000221 HC OXYGEN, UP TO 24 HOURS

## 2024-11-21 PROCEDURE — 84100 ASSAY OF PHOSPHORUS: CPT | Mod: 91

## 2024-11-21 PROCEDURE — 92610 EVALUATE SWALLOWING FUNCTION: CPT

## 2024-11-21 PROCEDURE — 83735 ASSAY OF MAGNESIUM: CPT | Performed by: NURSE PRACTITIONER

## 2024-11-21 PROCEDURE — 82803 BLOOD GASES ANY COMBINATION: CPT

## 2024-11-21 RX ORDER — ATROPINE SULFATE 0.1 MG/ML
INJECTION INTRAVENOUS
Status: DISPENSED
Start: 2024-11-21 | End: 2024-11-22

## 2024-11-21 RX ORDER — HEPARIN SODIUM 5000 [USP'U]/ML
7500 INJECTION, SOLUTION INTRAVENOUS; SUBCUTANEOUS EVERY 8 HOURS
Status: CANCELLED | OUTPATIENT
Start: 2024-11-22

## 2024-11-21 RX ORDER — HEPARIN SODIUM 5000 [USP'U]/ML
5000 INJECTION, SOLUTION INTRAVENOUS; SUBCUTANEOUS EVERY 8 HOURS
Status: DISCONTINUED | OUTPATIENT
Start: 2024-11-21 | End: 2024-11-25

## 2024-11-21 RX ORDER — LEVETIRACETAM 500 MG/5ML
1000 INJECTION, SOLUTION, CONCENTRATE INTRAVENOUS EVERY 12 HOURS
Status: DISCONTINUED | OUTPATIENT
Start: 2024-11-21 | End: 2024-11-25

## 2024-11-21 RX ORDER — HYDRALAZINE HYDROCHLORIDE 20 MG/ML
10 INJECTION INTRAMUSCULAR; INTRAVENOUS EVERY 6 HOURS PRN
Status: DISCONTINUED | OUTPATIENT
Start: 2024-11-21 | End: 2024-11-25

## 2024-11-21 RX ORDER — HYDRALAZINE HYDROCHLORIDE 20 MG/ML
10 INJECTION INTRAMUSCULAR; INTRAVENOUS EVERY 6 HOURS PRN
Status: DISCONTINUED | OUTPATIENT
Start: 2024-11-21 | End: 2024-11-21

## 2024-11-21 RX ORDER — CITALOPRAM 10 MG/1
10 TABLET ORAL DAILY
Status: CANCELLED | OUTPATIENT
Start: 2024-11-21

## 2024-11-21 RX ORDER — NICARDIPINE HYDROCHLORIDE 0.2 MG/ML
0-15 INJECTION INTRAVENOUS CONTINUOUS
Status: DISCONTINUED | OUTPATIENT
Start: 2024-11-21 | End: 2024-11-23

## 2024-11-21 RX ORDER — NICARDIPINE HYDROCHLORIDE 0.2 MG/ML
0-15 INJECTION INTRAVENOUS CONTINUOUS
Status: DISCONTINUED | OUTPATIENT
Start: 2024-11-21 | End: 2024-11-21

## 2024-11-21 RX ORDER — MILRINONE LACTATE 0.2 MG/ML
0.5 INJECTION, SOLUTION INTRAVENOUS CONTINUOUS
Status: DISCONTINUED | OUTPATIENT
Start: 2024-11-21 | End: 2024-11-23

## 2024-11-21 RX ORDER — POTASSIUM CHLORIDE 7.45 MG/ML
20 INJECTION INTRAVENOUS
Status: DISPENSED | OUTPATIENT
Start: 2024-11-21 | End: 2024-11-21

## 2024-11-21 RX ADMIN — POTASSIUM CHLORIDE 20 MEQ: 7.45 INJECTION INTRAVENOUS at 06:11

## 2024-11-21 RX ADMIN — DEXAMETHASONE SODIUM PHOSPHATE 4 MG: 4 INJECTION INTRA-ARTICULAR; INTRALESIONAL; INTRAMUSCULAR; INTRAVENOUS; SOFT TISSUE at 11:11

## 2024-11-21 RX ADMIN — HYDRALAZINE HYDROCHLORIDE 10 MG: 20 INJECTION, SOLUTION INTRAMUSCULAR; INTRAVENOUS at 09:11

## 2024-11-21 RX ADMIN — IOHEXOL 50 ML: 350 INJECTION, SOLUTION INTRAVENOUS at 05:11

## 2024-11-21 RX ADMIN — HYDRALAZINE HYDROCHLORIDE 10 MG: 20 INJECTION, SOLUTION INTRAMUSCULAR; INTRAVENOUS at 01:11

## 2024-11-21 RX ADMIN — OXYCODONE 5 MG: 5 TABLET ORAL at 10:11

## 2024-11-21 RX ADMIN — IOHEXOL 100 ML: 350 INJECTION, SOLUTION INTRAVENOUS at 10:11

## 2024-11-21 RX ADMIN — MUPIROCIN 1 G: 20 OINTMENT TOPICAL at 11:11

## 2024-11-21 RX ADMIN — OXYCODONE 5 MG: 5 TABLET ORAL at 03:11

## 2024-11-21 RX ADMIN — SODIUM CHLORIDE 1000 ML: 9 INJECTION, SOLUTION INTRAVENOUS at 06:11

## 2024-11-21 RX ADMIN — SODIUM CHLORIDE: 9 INJECTION, SOLUTION INTRAVENOUS at 08:11

## 2024-11-21 RX ADMIN — NICARDIPINE HYDROCHLORIDE 15 MG/HR: 0.2 INJECTION, SOLUTION INTRAVENOUS at 11:11

## 2024-11-21 RX ADMIN — NICARDIPINE HYDROCHLORIDE 10 MG/HR: 0.2 INJECTION, SOLUTION INTRAVENOUS at 09:11

## 2024-11-21 RX ADMIN — POTASSIUM PHOSPHATE, MONOBASIC POTASSIUM PHOSPHATE, DIBASIC 15 MMOL: 224; 236 INJECTION, SOLUTION, CONCENTRATE INTRAVENOUS at 08:11

## 2024-11-21 RX ADMIN — LEVETIRACETAM 1000 MG: 100 INJECTION INTRAVENOUS at 09:11

## 2024-11-21 RX ADMIN — DEXAMETHASONE SODIUM PHOSPHATE 4 MG: 4 INJECTION INTRA-ARTICULAR; INTRALESIONAL; INTRAMUSCULAR; INTRAVENOUS; SOFT TISSUE at 06:11

## 2024-11-21 RX ADMIN — LEVETIRACETAM 1000 MG: 100 INJECTION INTRAVENOUS at 11:11

## 2024-11-21 RX ADMIN — CEFAZOLIN 2 G: 2 INJECTION, POWDER, FOR SOLUTION INTRAMUSCULAR; INTRAVENOUS at 09:11

## 2024-11-21 RX ADMIN — CEFAZOLIN 2 G: 2 INJECTION, POWDER, FOR SOLUTION INTRAMUSCULAR; INTRAVENOUS at 06:11

## 2024-11-21 RX ADMIN — POTASSIUM BICARBONATE 35 MEQ: 391 TABLET, EFFERVESCENT ORAL at 11:11

## 2024-11-21 RX ADMIN — NICARDIPINE HYDROCHLORIDE 5 MG/HR: 0.2 INJECTION, SOLUTION INTRAVENOUS at 08:11

## 2024-11-21 RX ADMIN — CEFAZOLIN 2 G: 2 INJECTION, POWDER, FOR SOLUTION INTRAMUSCULAR; INTRAVENOUS at 01:11

## 2024-11-21 RX ADMIN — HEPARIN SODIUM 5000 UNITS: 5000 INJECTION INTRAVENOUS; SUBCUTANEOUS at 10:11

## 2024-11-21 RX ADMIN — LEVETIRACETAM 500 MG: 100 INJECTION INTRAVENOUS at 09:11

## 2024-11-21 RX ADMIN — MILRINONE LACTATE IN DEXTROSE 0.25 MCG/KG/MIN: 200 INJECTION, SOLUTION INTRAVENOUS at 06:11

## 2024-11-21 RX ADMIN — MUPIROCIN: 20 OINTMENT TOPICAL at 09:11

## 2024-11-21 NOTE — HPI
Isabel Coats is a 73-year-old female past medical history of hypertension, obstructive sleep apnea on CPAP, suggestive heart failure, obesity, history of gastric cancer status post chemoradiation and DVT in currently on Eliquis. She presents to [facility] pterional crani, 7 clips applied in proximity to large irregular L MCA bifurcation aneurysm and other adjacent small aneurysms, 1 clip applied across neck of aneurysm just proximal to anterior choroidal.  She also has a current smoker she was initially referred to Neurology for workup for dizziness. She had been complaining of vertigo multiple times a day for the past several months, associated with shortness of breath, palpitation, nausea, headache, and feeling faint. She is admitted to [facility] for a higher level of care.

## 2024-11-21 NOTE — SUBJECTIVE & OBJECTIVE
Interval History: 11/21: POD1 s/p L pterional for clipping of L MCA bifurcation and ant choroidal aneurysms. NAEON. Neuro exam grossly stable. Pain control. Drain output ~225cc.    Medications:  Continuous Infusions:   0.9% NaCl   Intravenous Continuous 50 mL/hr at 11/21/24 0605 Rate Verify at 11/21/24 0605    nicardipine  0-15 mg/hr Intravenous Continuous 25 mL/hr at 11/21/24 0854 5 mg/hr at 11/21/24 0854     Scheduled Meds:   ceFAZolin (Ancef) IV (PEDS and ADULTS)  2 g Intravenous Q8H    dexAMETHasone  4 mg Intravenous Q6H    [START ON 11/22/2024] heparin (porcine)  5,000 Units Subcutaneous Q8H    indocyanine green  25 mg Intravenous Once    levETIRAcetam (Keppra) IV (PEDS and ADULTS)  500 mg Intravenous Q12H    mupirocin   Nasal BID    senna-docusate 8.6-50 mg  1 tablet Oral Daily     PRN Meds:  Current Facility-Administered Medications:     acetaminophen, 650 mg, Oral, Q4H PRN    bisacodyL, 10 mg, Rectal, Daily PRN    hydrALAZINE, 10 mg, Intravenous, Q6H PRN    magnesium oxide, 800 mg, Oral, PRN    magnesium oxide, 800 mg, Oral, PRN    morphine, 2 mg, Intravenous, Q6H PRN    naloxone, 0.4 mg, Intravenous, PRN    ondansetron, 4 mg, Intravenous, Q12H PRN    oxyCODONE, 5 mg, Oral, Q6H PRN    potassium bicarbonate, 35 mEq, Oral, PRN    potassium bicarbonate, 50 mEq, Oral, PRN    potassium bicarbonate, 60 mEq, Oral, PRN    potassium, sodium phosphates, 2 packet, Oral, PRN    potassium, sodium phosphates, 2 packet, Oral, PRN    potassium, sodium phosphates, 2 packet, Oral, PRN    sodium chloride 0.9%, 10 mL, Intravenous, PRN     Review of Systems  Objective:     Weight: 123.4 kg (272 lb)  Body mass index is 43.9 kg/m².  Vital Signs (Most Recent):  Temp: 98.5 °F (36.9 °C) (11/21/24 0305)  Pulse: 75 (11/21/24 0600)  Resp: (!) 43 (11/21/24 0600)  BP: 139/68 (11/21/24 0853)  SpO2: 96 % (11/21/24 0600) Vital Signs (24h Range):  Temp:  [98.5 °F (36.9 °C)-99 °F (37.2 °C)] 98.5 °F (36.9 °C)  Pulse:  [] 75  Resp:   "[16-43] 43  SpO2:  [95 %-99 %] 96 %  BP: (115-163)/(56-88) 139/68  Arterial Line BP: (121-173)/(47-79) 154/60                              Closed/Suction Drain 11/20/24 1930 Scalp Accordion 10 Fr. (Active)   Site Description Unable to view 11/21/24 0505   Dressing Type Gauze 11/21/24 0505   Dressing Status Old drainage 11/21/24 0505   Dressing Intervention Integrity maintained 11/21/24 0505   Status To bulb suction 11/21/24 0505   Output (mL) 60 mL 11/21/24 0605            Urethral Catheter 11/20/24 1215 Non-latex 16 Fr. (Active)   Site Assessment Clean 11/21/24 0505   Collection Container Urimeter 11/21/24 0505   Securement Method secured to top of thigh w/ adhesive device 11/21/24 0505   Catheter Care Performed yes 11/21/24 0505   Reason for Continuing Urinary Catheterization Critically ill in ICU and requiring hourly monitoring of intake/output 11/21/24 0505   CAUTI Prevention Bundle Securement Device in place with 1" slack;Intact seal between catheter & drainage tubing;Drainage bag/urimeter off the floor;Sheeting clip in use;No dependent loops or kinks;Drainage bag/urimeter not overfilled (<2/3 full);Drainage bag/urimeter below bladder 11/21/24 0305   Output (mL) 200 mL 11/21/24 0605          Physical Exam         Neurosurgery Physical Exam    [id number]  AOx1-2  PERRL  EOMI  Fcx4 AG nonfocal      Significant Labs:  Recent Labs   Lab 11/20/24 2101 11/21/24  0330   * 156*    141   K 3.8 3.4*    110   CO2 19* 19*   BUN 20 16   CREATININE 0.9 0.8   CALCIUM 8.8 8.9   MG  --  1.9     Recent Labs   Lab 11/20/24  1711 11/20/24 2101 11/21/24  0330   WBC  --  16.65* 21.21*   HGB  --  10.4* 10.4*   HCT 31* 33.3* 32.0*   PLT  --  257 246     Recent Labs   Lab 11/21/24  0330   INR 0.9   APTT 24.8     Microbiology Results (last 7 days)       ** No results found for the last 168 hours. **          All pertinent labs from the last 24 hours have been reviewed.    Significant Diagnostics:  I have reviewed all " pertinent imaging results/findings within the past 24 hours.

## 2024-11-21 NOTE — PLAN OF CARE
[facility]  Initial Discharge Assessment       Primary Care Provider: Lei Garcia MD    Admission Diagnosis: Cerebral aneurysm, nonruptured [I67.1]  Aneurysm [I72.9]    Admission Date: 11/20/2024  Expected Discharge Date: 11/26/2024    Transition of Care Barriers: (P) None    Payor: HU[employer] MANAGED MEDICARE / Plan: [employer] SNP HMO PPO SPECIAL NEEDS / Product Type: Medicare Advantage /     Extended Emergency Contact Information  Primary Emergency Contact: Lillian Coats   [place]  Mobile Phone: [phone]  Relation: Daughter  Secondary Emergency Contact: [place] Santana  Mobile Phone: [phone]  Relation: Other  Preferred language: English   needed? No    Discharge Plan A: (P) Rehab  Discharge Plan B: (P) [facility] - [place], [place] - [address]  [address] [address]  Phone: [phone] Fax: [phone]    [facility]  [address]  [place] [address]  Phone: [phone] Fax: [phone]      Initial Assessment (most recent)       Adult Discharge Assessment - 11/21/24 1000          Discharge Assessment    Assessment Type Discharge Planning Assessment (P)      Confirmed/corrected address, phone number and insurance Yes (P)      Confirmed Demographics Correct on Facesheet (P)      Source of Information family (P)      If unable to respond/provide information was family/caregiver contacted? Yes (P)      Contact Name/Number Daughter Lillian Coats [phone] (P)      Communicated ZAIDA with patient/caregiver Date not available/Unable to determine (P)      Reason For Admission Cerebral aneurysm (P)      People in Home alone (P)      Facility Arrived From: Home (P)      Do you expect to return to your current living situation? Yes (P)      Do you have help at home or someone to help you manage your care at home? No (P)      Prior to hospitilization cognitive status:  Unable to Assess (P)      Current cognitive status: Unable to Assess (P)      Walking or Climbing Stairs Difficulty yes (P)      Walking or Climbing Stairs ambulation difficulty, requires equipment (P)      Mobility Management RW (P)      Dressing/Bathing Difficulty yes (P)      Dressing/Bathing bathing difficulty, requires equipment (P)      Dressing/Bathing Management TTB (P)      Home Accessibility wheelchair accessible (P)      Home Layout Able to live on 1st floor (P)      Equipment Currently Used at Home bath bench;CPAP;walker, rolling;bedside commode (P)    doesn't use the C    Readmission within 30 days? No (P)      Patient currently being followed by outpatient case management? No (P)      Do you currently have service(s) that help you manage your care at home? No (P)      Do you take prescription medications? Yes (P)      Do you have prescription coverage? Yes (P)      Coverage [employer] Medicare (P)      Do you have any problems affording any of your prescribed medications? No (P)      Is the patient taking medications as prescribed? yes (P)      Who is going to help you get home at discharge? Daughter Lillian Coats [phone] (P)      How do you get to doctors appointments? family or friend will provide (P)      Are you on dialysis? No (P)      Do you take coumadin? No (P)      Discharge Plan A Rehab (P)      Discharge Plan B Home Health (P)      DME Needed Upon Discharge  -- (P)    tbd    Discharge Plan discussed with: Adult children (P)      Transition of Care Barriers None (P)         Financial Resource Strain    How hard is it for you to pay for the very basics like food, housing, medical care, and heating? Somewhat hard (P)         Housing Stability    In the last 12 months, was there a time when you were not able to pay the mortgage or rent on time? No (P)      At any time in the past 12 months, were you homeless or living in a shelter (including now)? No (P)         Transportation Needs    Has  the lack of transportation kept you from medical appointments, meetings, work or from getting things needed for daily living? No (P)         Food Insecurity    Within the past 12 months, you worried that your food would run out before you got the money to buy more. Never true (P)      Within the past 12 months, the food you bought just didn't last and you didn't have money to get more. Never true (P)         Stress    Do you feel stress - tense, restless, nervous, or anxious, or unable to sleep at night because your mind is troubled all the time - these days? Patient unable to answer (P)         Social Isolation    How often do you feel lonely or isolated from those around you?  Patient unable to answer (P)         Utilities    In the past 12 months has the electric, gas, oil, or water company threatened to shut off services in your home? No (P)         Health Literacy    How often do you need to have someone help you when you read instructions, pamphlets, or other written material from your doctor or pharmacy? Patient unable to respond (P)                    SW completed initial discharge assessment with patient's daughter Lillian Coats [phone]  . Pt's family will provide transportation home.  Pt provided discharge planning booklet with case management contact information provided.     Pt lives alone in an apt w/ 1 step for entry.  Prior to hospitalization, pt was mostly independent with ADLs and mobility.  DME:  RW, CPAP, BSC, TTB.  Pt has family support at home, but Lillian mentioned she'll need additional help. SW answered questions regarding private sitters, and the possible need for rehab prior to going home.       Pt does not receive coumadin, dialysis, or HH services.  Pt reported compliance with medications.       Discharge Plan A and Plan B have been determined by review of patient's clinical status, future medical and therapeutic needs, and coverage/benefits for post-acute care in coordination  with multidisciplinary team members.    ESTRELLA Pitts, JENNIFER  [facility]  [id number]

## 2024-11-21 NOTE — NURSING
To CTA via bed with portable monitor, ambmartha and 02 on cardene, returned to room at approx 10:45am, Dr. Simon and JARET Rivera at bedside to examine pt. Neuro unchanged essentially since 7:30 am with exception  of more periods of irritability and restlessness, WCTCM and hook up to EEG before next scan.

## 2024-11-21 NOTE — ANESTHESIA POSTPROCEDURE EVALUATION
Anesthesia Post Evaluation    Patient: Ca Coats    Procedure(s) Performed: Procedure(s) (LRB):  CRANIOTOMY, WITH ANEURYSM CLIPPING W/ STEALTH (Left)  CRANIOTOMY, FOR ANEURYSM OF VERTEBROBASILAR OR CAROTID CIRCULATION (Left)  DISSECTION, NERVE, USING OPERATING MICROSCOPE (Left)    Final Anesthesia Type: general      Patient location during evaluation: ICU  Patient participation: Yes- Able to Participate  Level of consciousness: awake and alert  Post-procedure vital signs: reviewed and stable  Pain management: adequate  Airway patency: patent    PONV status at discharge: No PONV  Anesthetic complications: no      Cardiovascular status: hemodynamically stable  Respiratory status: spontaneous ventilation  Follow-up not needed.              Vitals Value Taken Time   /80 11/20/24 2108   Temp 98.0 11/20/24 2109   Pulse 88 11/20/24 2108   Resp 20 11/20/24 2108   SpO2 97 % 11/20/24 2108   Vitals shown include unfiled device data.      No case tracking events are documented in the log.      Pain/Hector Score: No data recorded

## 2024-11-21 NOTE — HOSPITAL COURSE
11/21/2024 noted to have intermittentleft facial twitching and decreased mental status.   11/22/2024 left gaze preference. CT perfusion with decreased flow left insula  11/23/2024 frequent short runs of Vtach while on milrinone. Neuro exam stable, without major improvement. MRI yesterday with edema in the L anterior temporal lobe and L posterior inferior frontal lobe with stable IPH of the frontal lobe and R superior cerebellum. After discussion with NSGY, plan to liberalize MAP goal to 100  11/24/2024 NAEO. Liberalize   11/25/2024: SBP goal 100-160, add psyllium, restart home gabapentin and citalopram, d/c continous IVF, increase sq heparin to weight base, add cardene gtt, add Lisinopril,

## 2024-11-21 NOTE — SIGNIFICANT EVENT
Notified by patients nurse of new L facial twitching, HR in the 40s. Upon patients exam, patient was able to open eyes to loud voice, HR in the 90s, intermittently follows simple commands. NSGY and NCC attending notified. Plans for STAT CT scan, EEG, EKG, ABG.

## 2024-11-21 NOTE — ASSESSMENT & PLAN NOTE
Isabel Coats is a 73-year-old female past medical history of hypertension, obstructive sleep apnea on CPAP, suggestive heart failure, obesity, history of gastric cancer status post chemoradiation and DVT in currently on Eliquis. She presents to [facility] L pterional crani, 7 clips applied in proximity to large irregular L MCA bifurcation aneurysm and other adjacent small aneurysms, 1 clip applied across neck of aneurysm just proximal to anterior choroidal.  She also has a current smoker she was initially referred to Neurology for workup for dizziness. She had been complaining of vertigo multiple times a day for the past several months, associated with shortness of breath, palpitation, nausea, headache, and feeling faint. She is admitted to [facility] for a higher level of care.      Imaging:  CTA Head 11/20: expected post-op changes    Plans:  -Admit to ICU  -q1h neurocheck  -All labs and imaging were reviewed  -Strict SBP <140  -Keep the drain to suction  -Continue to monitor respiratory status  -keppra 500 BID x1 wk  -Pain control  -PT/OT/OOB  -Continue to monitor clinically, notify NSGY immediately with any changes in neuro status     Discussed with staff Dr. Simon

## 2024-11-21 NOTE — PLAN OF CARE
Recommendations    Advance to cardiac diet when able, texture per SLP.   RD to monitor and follow up.    Goals: Meet % EEN/EPN by RD follow up.  Nutrition Goal Status: new  Communication of RD Recs: other (comment) (POC)

## 2024-11-21 NOTE — PT/OT/SLP PROGRESS
Physical Therapy      Patient Name:  Ca Coats   MRN:  [id number]    Patient not seen today secondary to Therapist assessment, Nurse/ FREEDOM hold. Pt medically unstable w/ multiple significant events occurring today requiring stat scans w/ new posterior fossa hemorrhage. Per MD and RN notes, pt w/ facial twitching, L gaze preference, bradycardic, dec command following, and sluggish L pupil still undergoing medical workup. Will hold on PT until pt medically stable enough to participate. Will follow-up as appropriate.  Denise Cervantes, PT

## 2024-11-21 NOTE — SUBJECTIVE & OBJECTIVE
Past Medical History:   Diagnosis Date    YOUNG (acute kidney injury) 10/15/2021    Anemia     2018    Arthritis     BMI 60.0-69.9, adult     Cataract     Chronic diastolic congestive heart failure 01/27/2021    Coronary artery disease involving native coronary artery of native heart without angina pectoris 11/15/2024    Deep vein thrombosis     Depression     Dry eye syndrome     DVT of deep femoral vein, right 05/29/2023    Gastric adenocarcinoma 05/25/2021    GERD (gastroesophageal reflux disease)     Glaucoma suspect     History of gastric ulcer     History of psychiatric hospitalization     Hx of psychiatric care     Celexa, no recall of charted Effexor, Lexapro, Desyrel prescriptions    Hyperlipidemia     Hypertension     Hypothyroidism     Morbid obesity     Neuromuscular disorder     Sleep apnea     Thyroid disease      Past Surgical History:   Procedure Laterality Date    APPENDECTOMY      CATARACT EXTRACTION W/  INTRAOCULAR LENS IMPLANT Bilateral     MFIOL OU    CORONARY ANGIOGRAPHY Bilateral 02/24/2021    Procedure: ANGIOGRAM, CORONARY ARTERY;  Surgeon: Cresencio Ridley MD;  Location: [facility] CATH LAB;  Service: Cardiology;  Laterality: Bilateral;  Covid test needed - ok per Danay SSCU. Pt. w/o transportation or family    ENDOSCOPIC ULTRASOUND OF UPPER GASTROINTESTINAL TRACT N/A 03/17/2021    Procedure: ULTRASOUND, UPPER GI TRACT, ENDOSCOPIC;  Surgeon: Gerald Anaya MD;  Location: [facility] ([address] [place] [address]);  Service: Endoscopy;  Laterality: N/A;  Pt unable to get a ride for swab. Will do rapid test at 8:30 - ttr    ENDOSCOPIC ULTRASOUND OF UPPER GASTROINTESTINAL TRACT N/A 01/13/2022    Procedure: ULTRASOUND, UPPER GI TRACT, ENDOSCOPIC;  Surgeon: Kevin Carballo MD;  Location: [address]);  Service: Endoscopy;  Laterality: N/A;  blood thinner approval received, see telephone encounter 1/7/22-BB  rapid  instructions given verbally and sent to address on file in pt's chart-BB    ENDOSCOPIC ULTRASOUND  OF UPPER GASTROINTESTINAL TRACT N/A 10/11/2022    Procedure: ULTRASOUND, UPPER GI TRACT, ENDOSCOPIC;  Surgeon: Dequan Ridley MD;  Location: [facility] ENDO (2ND FLR);  Service: Endoscopy;  Laterality: N/A;    ENDOSCOPIC ULTRASOUND OF UPPER GASTROINTESTINAL TRACT N/A 01/24/2024    Procedure: ULTRASOUND, UPPER GI TRACT, ENDOSCOPIC;  Surgeon: Kevin Carballo MD;  Location: [facility] ENDO (2ND FLR);  Service: Endoscopy;  Laterality: N/A;    ENDOSCOPIC ULTRASOUND OF UPPER GASTROINTESTINAL TRACT N/A 03/05/2024    Procedure: ULTRASOUND, UPPER GI TRACT, ENDOSCOPIC;  Surgeon: Kevin Carballo MD;  Location: [facility] ENDO (2ND FLR);  Service: Endoscopy;  Laterality: N/A;  1/25 portal instr-Repeat theEUS at the next available appointment because the preparation was poor. 48hrs of liquid. Ozempic 7 day hold-eliquis approved Dr. Pelaez TE 12/12/2023-tt  2/28-precall complete-pt verbalized udnerstanding of holding Oz    ESOPHAGOGASTRODUODENOSCOPY N/A 02/05/2021    Procedure: EGD (ESOPHAGOGASTRODUODENOSCOPY);  Surgeon: Matthew Hernadez MD;  Location: [facility] SAM (2ND FLR);  Service: Endoscopy;  Laterality: N/A;  BMI-58    Wt: 361#     COVID test at [facility] on 2/2-GT    ESOPHAGOGASTRODUODENOSCOPY N/A 03/17/2021    Procedure: EGD (ESOPHAGOGASTRODUODENOSCOPY);  Surgeon: Gerald Anaya MD;  Location: [facility] ENDO (2ND FLR);  Service: Endoscopy;  Laterality: N/A;    ESOPHAGOGASTRODUODENOSCOPY N/A 05/25/2021    Procedure: EGD (ESOPHAGOGASTRODUODENOSCOPY);  Surgeon: Kevin Carballo MD;  Location: [facility] ENDO (2ND FLR);  Service: Endoscopy;  Laterality: N/A;  ESD 2 hours  Full COVID vaccination on file. ttr    ESOPHAGOGASTRODUODENOSCOPY N/A 01/13/2022    Procedure: EGD (ESOPHAGOGASTRODUODENOSCOPY);  Surgeon: Kevin Carballo MD;  Location: [facility] ENDO (2ND FLR);  Service: Endoscopy;  Laterality: N/A;  blood thinner approval, see telephone encounter 1/7/22-BB  rapid  instructions given verbally and sent to address on file in pt's chart-BB     ESOPHAGOGASTRODUODENOSCOPY N/A 10/11/2022    Procedure: EGD (ESOPHAGOGASTRODUODENOSCOPY);  Surgeon: Dequan Ridley MD;  Location: [facility] ENDO (2ND FLR);  Service: Endoscopy;  Laterality: N/A;  She is do for EGD/EUS now. Personal history of gastric cancer. Ca Coats #[id number].   Thanks,   Kevin Carballo. MD    Pt is fully vaccinated-DS  9/14/22-Approval to hold Eliquis rec'd from Dr. Pelaez (see telephone encounter 9/14/22)-DS  9/14/22-Ins    ESOPHAGOGASTRODUODENOSCOPY N/A 01/24/2024    Procedure: EGD (ESOPHAGOGASTRODUODENOSCOPY);  Surgeon: Kevin Cabrallo MD;  Location: [facility] (2ND FLR);  Service: Endoscopy;  Laterality: N/A;  12/8/23: instructions sent via portal. eliquis approval from MD Pelaez in telephone encounter (12/12/23) -GD  1/9-precall complete-MS    ESOPHAGOGASTRODUODENOSCOPY N/A 03/05/2024    Procedure: EGD (ESOPHAGOGASTRODUODENOSCOPY);  Surgeon: Kevin Carballo MD;  Location: [facility] SAM (2ND FLR);  Service: Endoscopy;  Laterality: N/A;    EYE SURGERY      HYSTEROSCOPIC POLYPECTOMY OF UTERUS  01/10/2024    Procedure: POLYPECTOMY, UTERUS, HYSTEROSCOPIC;  Surgeon: Pina Pickens MD;  Location: [facility] OR;  Service: OB/GYN;;    HYSTEROSCOPY WITH DILATION AND CURETTAGE OF UTERUS N/A 01/10/2024    Procedure: HYSTEROSCOPY, WITH DILATION AND CURETTAGE OF UTERUS;  Surgeon: Pina Pickens MD;  Location: [facility] OR;  Service: OB/GYN;  Laterality: N/A;    INJECTION OF ANESTHETIC AGENT AROUND NERVE Left 07/10/2018    Procedure: BLOCK, NERVE;  Surgeon: Samantha Vidal MD;  Location: [facility] PAIN MGT;  Service: Pain Management;  Laterality: Left;  Genicular     INJECTION OF ANESTHETIC AGENT AROUND NERVE Left 07/19/2019    Procedure: Block, Nerve NERVE BLOCK GENICULAR WITH PHENOL 6%;  Surgeon: Samantha Vidal MD;  Location: [facility] PAIN MGT;  Service: Pain Management;  Laterality: Left;  NEEDS CONSENT    INSERTION OF TUNNELED CENTRAL VENOUS CATHETER (CVC) WITH SUBCUTANEOUS PORT N/A 07/09/2021     Procedure: INSERTION, PORT-A-CATH;  Surgeon: Mario Paz MD;  Location: [facility] CATH LAB;  Service: Radiology;  Laterality: N/A;  PORT PLACEMENT    RADIOFREQUENCY ABLATION Left 06/19/2020    Procedure: RADIOFREQUENCY ABLATION LEFT GENICULAR;  Surgeon: Tevin Clark MD;  Location: [facility] PAIN MGT;  Service: Pain Management;  Laterality: Left;  Left Genicular RFA    RADIOFREQUENCY ABLATION Left 01/22/2021    Procedure: RADIOFREQUENCY ABLATION LEFT GENICULAR;  Surgeon: Tevin Clark MD;  Location: [facility] PAIN MGT;  Service: Pain Management;  Laterality: Left;    RADIOFREQUENCY ABLATION Left 07/30/2021    Procedure: RADIOFREQUENCY ABLATION, GENICULAR COOLED NEED CONSENT;  Surgeon: Tevin Clark MD;  Location: [facility] PAIN MGT;  Service: Pain Management;  Laterality: Left;    RADIOFREQUENCY ABLATION Left 04/22/2022    Procedure: RADIOFREQUENCY ABLATION, GENICULAR COOLED , LEFT;  Surgeon: Tevin Clark MD;  Location: [facility] PAIN MGT;  Service: Pain Management;  Laterality: Left;    RADIOFREQUENCY ABLATION Left 12/23/2022    Procedure: RADIOFREQUENCY ABLATION LEFT GENICULAR COOLED CLEARED TO HOLD ELIQUIS 3 DAYS  NEEDS CONSENT;  Surgeon: Tevin Clark MD;  Location: [facility] PAIN MGT;  Service: Pain Management;  Laterality: Left;    TONSILLECTOMY        Current Facility-Administered Medications on File Prior to Encounter   Medication Dose Route Frequency Provider Last Rate Last Admin    0.9%  NaCl infusion   Intravenous Continuous Tevin Clark MD 0 mL/hr at 01/13/22 1055 New Bag at 03/05/24 1000    0.9%  NaCl infusion   Intravenous Continuous Kevin Carballo MD        sodium chloride 0.9% flush 10 mL  10 mL Intravenous PRN Kevin Carballo MD         Current Outpatient Medications on File Prior to Encounter   Medication Sig Dispense Refill    acetaminophen (TYLENOL) 500 MG tablet Take by mouth daily as needed.      atorvastatin (LIPITOR) 20 MG tablet Take 1 tablet (20 mg total) by mouth once daily. 90 tablet  11    B-complex with vitamin C (Z-BEC OR EQUIV) tablet Take 1 tablet by mouth once daily.       citalopram (CELEXA) 10 MG tablet Take 1 tablet (10 mg total) by mouth once daily. 90 tablet 11    furosemide (LASIX) 80 MG tablet Take 1 tablet (80 mg total) by mouth 2 (two) times daily. 180 tablet 11    gabapentin (NEURONTIN) 300 MG capsule Take 2 capsules (600 mg total) by mouth 3 (three) times daily. 540 capsule 2    lisinopriL (PRINIVIL,ZESTRIL) 40 MG tablet Take 1 tablet (40 mg total) by mouth once daily. 90 tablet 11    multivitamin with minerals tablet Take 1 tablet by mouth once daily.       pantoprazole (PROTONIX) 40 MG tablet Take 1 tablet (40 mg total) by mouth once daily. 90 tablet 11    semaglutide (OZEMPIC) 1 mg/dose (4 mg/3 mL) Inject 1 mg into the skin every 7 days. 9 each 11    CALCIUM CITRATE-VITAMIN D2 ORAL Take 1 tablet by mouth once daily.         Allergies: Patient has no known allergies.  Family History   Problem Relation Name Age of Onset    No Known Problems Mother      No Known Problems Father      Colon cancer Neg Hx      Esophageal cancer Neg Hx       Social History     Tobacco Use    Smoking status: Some Days     Current packs/day: 0.00     Average packs/day: 1 pack/day for 53.9 years (53.7 ttl pk-yrs)     Types: Cigarettes     Start date:      Last attempt to quit: 2023     Years since quittin.9    Smokeless tobacco: Never    Tobacco comments:     currently smoking <5 cigarettes most days   Substance Use Topics    Alcohol use: Yes     Alcohol/week: 1.0 standard drink of alcohol     Types: 1 Glasses of wine per week     Comment: rarely    Drug use: No     Review of Systems    Unable to perform due to LOC    Objective:     Vitals:    Pulse: 98  BP: 124/68  Resp: (!) 26  SpO2: 95 %    Temp  Min: 98.1 °F (36.7 °C)  Max: 98.1 °F (36.7 °C)  Pulse  Min: 71  Max: 98  BP  Min: 124/68  Max: 131/61  MAP (mmHg)  Min: 88  Max: 88  Resp  Min: 21  Max: 26  SpO2  Min: 95 %  Max: 96 %    No  intake/output data recorded.            Physical Exam      Physical Exam:  GA: sedated , comfortable, no acute distress.   HEENT: No scleral icterus or JVD.   Pulmonary: Clear to auscultation A/L. .  Cardiac: RRR S1 & S2 w/o rubs/murmurs/gallops.   Abdominal: Bowel sounds present x 4.   Skin: drain in place to scalp   Neuro:  --GCS: E4 V3 M6  --Mental Status:  sedated post op opens eyes spontaneously, incomprehensible words, follows some simple commands    --CN II-XII grossly intact as best can be assessed   --Pupils 3mm, PERRL.   --Corneal reflex, gag, cough intact.  --moves all ext spont     Unable to test judgment, insight, fund of knowledge, hearing, shoulder shrug, tongue protrusion, coordination, gait due to level of consciousness.       Today I personally reviewed pertinent medications, lines/drains/airways, imaging, laboratory results, notably:  CTA

## 2024-11-21 NOTE — PROGRESS NOTES
[facility] - Neuro Critical Care  Neurocritical Care  Progress Note    Admit Date: 11/20/2024  Service Date: 11/21/2024  Length of Stay: 1    Subjective:     Chief Complaint: Cerebral aneurysm    History of Present Illness: Isabel Coats is a 73-year-old female past medical history of hypertension, obstructive sleep apnea on CPAP, suggestive heart failure, obesity, history of gastric cancer status post chemoradiation and DVT in currently on Eliquis. She presents to [facility] L pterional crani, 7 clips applied in proximity to large irregular L MCA bifurcation aneurysm and other adjacent small aneurysms, 1 clip applied across neck of aneurysm just proximal to anterior choroidal.  She also has a current smoker she was initially referred to Neurology for workup for dizziness. She had been complaining of vertigo multiple times a day for the past several months, associated with shortness of breath, palpitation, nausea, headache, and feeling faint. She is admitted to [facility] for a higher level of care.     Hospital Course: 11/21/2024 noted to have intermittentleft facial twitching and decreased mental status.     Review of Symptoms: encephalopathic cannot participate    Medications:  Continuous Infusions:   0.9% NaCl   Intravenous Continuous 50 mL/hr at 11/21/24 0605 Rate Verify at 11/21/24 0605    nicardipine  0-15 mg/hr Intravenous Continuous 25 mL/hr at 11/21/24 0854 5 mg/hr at 11/21/24 0854     Scheduled Meds:   ceFAZolin (Ancef) IV (PEDS and ADULTS)  2 g Intravenous Q8H    dexAMETHasone  4 mg Intravenous Q6H    [START ON 11/22/2024] heparin (porcine)  5,000 Units Subcutaneous Q8H    indocyanine green  25 mg Intravenous Once    levETIRAcetam (Keppra) IV (PEDS and ADULTS)  500 mg Intravenous Q12H    mupirocin   Nasal BID    senna-docusate 8.6-50 mg  1 tablet Oral Daily     PRN Meds:.  Current Facility-Administered Medications:     acetaminophen, 650 mg, Oral, Q4H PRN    bisacodyL, 10 mg, Rectal, Daily PRN    hydrALAZINE, 10 mg,  Intravenous, Q6H PRN    magnesium oxide, 800 mg, Oral, PRN    magnesium oxide, 800 mg, Oral, PRN    morphine, 2 mg, Intravenous, Q6H PRN    naloxone, 0.4 mg, Intravenous, PRN    ondansetron, 4 mg, Intravenous, Q12H PRN    oxyCODONE, 5 mg, Oral, Q6H PRN    potassium bicarbonate, 35 mEq, Oral, PRN    potassium bicarbonate, 50 mEq, Oral, PRN    potassium bicarbonate, 60 mEq, Oral, PRN    potassium, sodium phosphates, 2 packet, Oral, PRN    potassium, sodium phosphates, 2 packet, Oral, PRN    potassium, sodium phosphates, 2 packet, Oral, PRN    sodium chloride 0.9%, 10 mL, Intravenous, PRN    OBJECTIVE:   Vital Signs (Most Recent):   Temp: 98.5 °F (36.9 °C) (11/21/24 0305)  Pulse: 66 (11/21/24 0911)  Resp: 20 (11/21/24 0911)  BP: 139/68 (11/21/24 0853)  SpO2: 98 % (11/21/24 0911)    Vital Signs (24h Range):   Temp:  [98.5 °F (36.9 °C)-99 °F (37.2 °C)] 98.5 °F (36.9 °C)  Pulse:  [] 66  Resp:  [16-43] 20  SpO2:  [95 %-99 %] 98 %  BP: (115-163)/(56-88) 139/68  Arterial Line BP: (121-173)/(47-79) 154/60    ICP/CPP (Last 24h):        I & O (Last 24h):   Intake/Output Summary (Last 24 hours) at 11/21/2024 0934  Last data filed at 11/21/2024 0605  Gross per 24 hour   Intake 3168.84 ml   Output 3665 ml   Net -496.16 ml     Physical Exam:  GA: drowsy, comfortable, no acute distress.   HEENT: No scleral icterus or JVD. Neck supple  Pulmonary: Air entry equal to auscultation A/P/L. Wheezing no, crackles no  Cardiac: RRR, S1 & S2 w/o rubs/murmurs/gallops.   Abdominal: soft, non-tender, bowel sounds present x 4. No appreciable hepatosplenomegaly.  Skin: No jaundice, rashes, or visible lesions.  Neuro:  --GCS: 10  --Mental Status:  lethargic, follows  simple commands.  Incomprehensible words   --Pupils 3.5 mm  Left gaze palsy  --Corneal reflex not done in this awake patient, gag, cough intact.  Moves bilateral UE and LE symmetrically  MSK:  No edema in UE and LE    Vent Data:            Lines/Drains/Airway:          PowerPort A  "Cath Single Lumen 07/09/21 1135 right internal jugular (Active)      Arterial Line 11/20/24 1431 Left Radial (Active)   Site Assessment Clean;Dry;Intact 11/21/24 0505   Line Status Pulsatile blood flow 11/21/24 0505   Art Line Waveform Appropriate 11/21/24 0505   Arterial Line Interventions Zeroed and calibrated 11/21/24 0505   Color/Movement/Sensation Capillary refill less than 3 sec 11/21/24 0505   Dressing Type CHG impregnated dressing/sponge 11/21/24 0505   Dressing Status Clean;Dry;Intact 11/21/24 0505   Dressing Intervention Integrity maintained 11/21/24 0505           Closed/Suction Drain 11/20/24 1930 Scalp Accordion 10 Fr. (Active)   Site Description Unable to view 11/21/24 0505   Dressing Type Gauze 11/21/24 0505   Dressing Status Old drainage 11/21/24 0505   Dressing Intervention Integrity maintained 11/21/24 0505   Status To bulb suction 11/21/24 0505   Output (mL) 60 mL 11/21/24 0605            Urethral Catheter 11/20/24 1215 Non-latex 16 Fr. (Active)   Site Assessment Clean 11/21/24 0505   Collection Container Urimeter 11/21/24 0505   Securement Method secured to top of thigh w/ adhesive device 11/21/24 0505   Catheter Care Performed yes 11/21/24 0505   Reason for Continuing Urinary Catheterization Critically ill in ICU and requiring hourly monitoring of intake/output 11/21/24 0505   CAUTI Prevention Bundle Securement Device in place with 1" slack;Intact seal between catheter & drainage tubing;Drainage bag/urimeter off the floor;Sheeting clip in use;No dependent loops or kinks;Drainage bag/urimeter not overfilled (<2/3 full);Drainage bag/urimeter below bladder 11/21/24 0305   Output (mL) 200 mL 11/21/24 0605     Nutrition/Tube Feeds (if NPO state why): NPO     Labs:  ABG:   Recent Labs   Lab 11/21/24  0911   PH 7.460*   PO2 73*   PCO2 36.6   HCO3 26.0   POCSATURATED 95   BE 2     BMP:  Recent Labs   Lab 11/21/24  0330      K 3.4*      CO2 19*   BUN 16   CREATININE 0.8   *   MG 1.9 "   PHOS 2.9     LFT:   Lab Results   Component Value Date    AST 19 11/21/2024    ALT 9 (L) 11/21/2024    ALKPHOS 89 11/21/2024    BILITOT 0.4 11/21/2024    ALBUMIN 3.3 (L) 11/21/2024    PROT 6.5 11/21/2024     CBC:   Lab Results   Component Value Date    WBC 21.21 (H) 11/21/2024    HGB 10.4 (L) 11/21/2024    HCT 32.0 (L) 11/21/2024     (H) 11/21/2024     11/21/2024     Microbiology x 7d:   Microbiology Results (last 7 days)       ** No results found for the last 168 hours. **          Imaging:  CT head 11/21 - right cerebellar ICH  I personally reviewed the above image.  Today I independently reviewed pertinent medications, lines/drains/airways, imaging, cardiology results, laboratory results, microbiology results, notably:   ASSESSMENT/PLAN:     Active Hospital Problems    Diagnosis    *Cerebral aneurysm    Tobacco dependency    Chronic diastolic congestive heart failure    Pure hypercholesterolemia    Debility    Essential hypertension      Neuro:   Post clipping of multiple cerebral aneurysms  Interval cerebellar ICH  Plan for CTA/CTV head. Evaluate for venous sinus thrombosis  cEEG  r/o status epilepticus  Decadron for 24hrsper neurosurgery  Na goal 140-150  Keppra 500mg [id number]    Pulmonary:   Saturating >92% on room air    Cardiac:   MAP goals 65-90mmhg    Renal:    Making urine  BUN/Cr <20    ID:   Afebrile, leucocytosis    Hem/Onc:   Stable hh and plt count    Endocrine:    BS stable will monitor    Fluids/Electrolytes/Nutrition/GI:     Lines:  Art +  CVC NA  ETT NA  Tatum NA  NG NA  PEG NA    Proph:  DVT:SCD/ NO HEPARIN DUE TO RECENT ICH  Constipation:  Last output:bowel regimen as needed  PUP: NA  VAP:NA    Critical condition in that Patient has a condition that poses threat to life and bodily function: ICH, cerebral edema, seizure     38 minutes of Critical care time was spent personally by me on the following activities: development of treatment plan with patient or surrogate and bedside  caregivers, discussions with consultants, evaluation of patient's response to treatment, examination of patient, ordering and performing treatments and interventions, ordering and review of laboratory studies, ordering and review of radiographic studies, pulse oximetry, antibiotic titration if applicable, vasopressor titration if applicable, re-evaluation of patient's condition. This critical care time did not overlap with that of any other provider or involve time for any procedures. There is high probability for acute neurological change leading to clinical and possibly life-threatening deterioration requiring highest level of physician preparedness for urgent intervention.    Gregor Velazquez MD, [facility]  Neurocritical care attending

## 2024-11-21 NOTE — OP NOTE
Date of Operation:  11/20/2024.    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    Procedure:  Left fronto-temporo--pterional craniotomy, clipping of left middle cerebral and left internal carotid-anterior choroidal artery aneurysms with neuro navigation and microsurgery.  Intraoperative neuro monitoring. Intraoperative ICG angiography.  Intraoperative doppler.     Surgeon:  Shankar Gannon MD    Co Surgeon:  Sejal Simon MD    Assistant:  Indu Schultz MD (Resident in Neurosurgery)    Anesthesia:  General Endotracheal.    Estimated Blood Loss:  300 ml.    Drains:  Hemovac Subgaleal.    Condition at End of Procedure:  Satisfactory.    Brief History:  This 73-year-old lady complained of intermittent episodes of dizziness and was seen in neurological evaluation.  MRA of the brain was ordered on 01/26/2024 and showed multiple unruptured cerebral aneurysms.  Cerebral angiography done on 06/06/2024 showed a large multi loculated left middle cerebral artery aneurysm, a left internal carotid aneurysm, a basilar bifurcation aneurysm, and a small aneurysm on the left ophthalmic artery.  She was seen in neurosurgical follow-up.  It was felt that the large irregular left middle cerebral artery aneurysm presented a higher risk of subarachnoid hemorrhage and it was decided to proceed with craniotomy and clipping of the middle cerebral and internal carotid artery aneurysms.  The basilar apex aneurysm could be considered for later endovascular surgery and the left carotid ophthalmic aneurysm is quite small and represents minimal risk.    Procedure in Detail:  The patient was brought to the operating room and in the supine position on the operating table under premedication intubated and induced with general anesthesia.  A cannula was placed in the  right radial artery for continuous blood pressure monitoring, a Tatum catheter inserted, sequential compression devices applied to the legs and various intravenous lines started.  Intraoperative neuro monitoring was set up.  The bed was then somewhat flexed and the head elevated, turned to the right and immobilized with the Cordoba 3 point skeletal fixation device.  The navigation arc was attached to the headrest and the stealth navigation system used to delineate important landmarks.  A small strip of hair in the left frontotemporal area was shaved and this portion of the scalp prepped with Betadine and draped in a sterile fashion.  The proposed frontotemporal incision was outlined and injected with 1% xylocaine and epinephrine.  The incision was carried through the skin and galea bleeding controlled on the skin margins and skin flap with Dandre clips.  The skin was elevated in the subgaleal space and retracted inferiorly with fishhook retractors.  The temporalis muscle was cut below the temporal line down along the zygoma and posteriorly to the temporal bone and the muscle elevated and retracted inferolaterally with the fishhook retractors.  Bur holes were placed at the zygomatic process of the frontal bone quite anteriorly on the sphenoid bone and posteriorly on the temporal bone and a frontotemporal parietal craniotomy cut with a high-speed drill elevated and removed from the operative field.  The sphenoid ridge was drilled down quite flat and some of the temporal squama removed to give full exposure of the temporal lobe.  Drill holes were made in the bone margin and the dura tacked up to these with 4-0 Nurolon sutures.  Clean towels were placed around the craniotomy opening in the operating microscope brought into the field.    The dura was opened with a curvilinear incision and retracted inferiorly with 4-0 Nurolon sutures.  Microdissection was begun anteriorly in the sylvian fissure opening the  carotid-chiasmatic cisterns and almost immediately the large left MCA bifurcation aneurysm was identified.  The MCA trunk bends and then comes forward so that the aneurysm was fairly close to the skull base.  Dissection was then carried along the middle cerebral artery proximally.  The 2 M2 branches were both densely adherent to the aneurysm fundus as they came up over the limen insula.  As dissection was carried out there was some bleeding proximal on the middle cerebral artery.  A small partial opening was seen and bleeding could be controlled with Surgicel and packing.  Attention was then turned back to the middle cerebral artery bifurcation where the 2 M2 branches were carefully dissected off of the fundus of the aneurysm releasing the arachnoid adhesions until they were free.  This necessitated some temporary occlusion of the middle cerebral artery and its branches.  Neuro monitoring remained stable over the short periods.  The aneurysm has multiple loculations.  There is also a tiny baby aneurysm on the middle cerebral artery proximal to the bifurcation.  With the neck of the aneurysm exposed an 18 mm Mizuho Sugita clip was placed across the neck of the aneurysm leaving enough room at the base that the M2 branches remained open.  A 2nd 18 mm clip was placed just distal to this with the large size of the aneurysm.  After some additional dissection 3 small 5 mm clips were placed across small loculations to completely occlude the aneurysm.  At this point ICG angiography and Doppler evaluation were done and showed the M1 and both M2s to be patent and no aneurysm filling.  A small 5 mm clip was placed on the baby aneurysm on the superior side of the MCA trunk.  Attention was then turned to the internal carotid artery this was dissected distally and the carotid aneurysm which originates just below the anterior choroidal artery but there is not a definite posterior communicating artery seen was dissected free of  arachnoid.  It was noted that the aneurysm somewhat compressed and elevated the oculomotor nerve.  A 12 mm straight clip was then placed across the neck of this aneurysm with good occlusion of the lesion.  The wound was thoroughly irrigated and Nu Knit placed over the brain surfaces.  The dura was closed with interrupted 4-0 Nurolon sutures and a piece of dura matrix placed over this.  The bone flap was returned in place using the ThromboVision microplate system.  Temporalis muscle was reattached with interrupted 2-0 Vicryl sutures, a Hemovac subgaleal drain placed and brought out through a separate stab incision posterior to the primary incision, the galea closed with inverted interrupted 2-0 Vicryl sutures and the skin closed with a chromic suture.  A Telfa bacitracin dressing was placed over this held in place with tape.  The patient's head was released from the 3 point skeletal fixation device, she was returned to full supine position, allowed to awaken from anesthesia, transferred to her hospital bed and brought to the neuro intensive care unit in stable condition.  She received 2 g of Rocephin, 12 mg of Decadron, and 1000 mg of Keppra at the beginning of the procedure and Rocephin containing antibiotic irrigating solutions were used throughout.

## 2024-11-21 NOTE — NURSING
Attempt to contact the patient's daughter with the number listed in the chart in reference to collecting information about the patient, but there was no answer. Will attempt to contact the family again later

## 2024-11-21 NOTE — ASSESSMENT & PLAN NOTE
- Admit to NCC   - s/p L pterional crani, 7 clips applied in proximity to large irregular L MCA bifurcation aneurysm and other adjacent small aneurysms, 1 clip applied across neck of aneurysm just proximal to anterior choroidal.   - CTA  - Q 1 vitals  - Q 1 neuro checks  - Keppra 500 q 1 2  - dex 4 q 6 x 3 doses  - SBP <140

## 2024-11-21 NOTE — NURSING
Natasha Rivera MD with NSGY at bedside aware of CT results, Left pupils sluggish , exam essentially same , she spoke to Dr. Simon and new orders for strict SBP < 120 noted, restarted cardene, WCTCM.

## 2024-11-21 NOTE — SIGNIFICANT EVENT
Examined patients on AM rounds at 6:30 AM, at that time she was drowsy and confused, but nonfocal on exam. She opened eyes to voice, both pupils reactive, was able to look in all directions, squeezed both hands and lifted all extremities antigravity to command. She stated her name, was not oriented to place or year - per RN had been intermittently oriented to place overnight but not to year. RN stated she had recently given pain medication. Vitals were appropriate on cardene drip. At this time, my plan was to reassess the patient in a couple hours during the day to see if she was more alert, but exam was nonfocal.     After shift change around 7:30, day shift RN noted left sided facial twitching, L gaze preference, as well as bradycardia and decreased reliability in following commands and responding verbally. Sent for stat CTH at 8 AM showing new posterior fossa hemorrhage. CO2 level on ABG wnl.     Assessed patient again personally around 10 AM, at this time she again opened eyes to voice, but had a noted left gaze preference, unable to overcome midline. Pupils reactive, RN noted mildly sluggish left pupil on pupillometer. Only intermittently following commands but moving all extremities spontaneously, overall clearly decreased level of responsiveness as compared to assessment at 6:30 AM.     Short interval CTA H/N done at 10:30. Bleed volume grossly stable will follow up read.    Plan at this time: SBP strict <120, 2nd interval CTH at 2:30 PM, EEG given facial twitching.     Discussed with Dr. Simon and NCC team     Indu Schultz NSGY PGY3

## 2024-11-21 NOTE — CARE UPDATE
Risks vs benefits of receiving a second dose of contrast thoroughly discussed with patient's daughter Lillian at bedside. All questions answered. Lillian consents to her mom receiving a second dose of contrast to obtain CT perfusion.

## 2024-11-21 NOTE — ASSESSMENT & PLAN NOTE
Left Ventricle: The left ventricle is normal in size. Normal wall thickness. There is normal systolic function with a visually estimated ejection fraction of 60 - 65%. There is normal diastolic function.    Right Ventricle: Normal right ventricular cavity size. Wall thickness is normal. Systolic function is normal.    Left Atrium: Left atrium is severely dilated.    Right Atrium: Right atrium is dilated.    Aortic Valve: The aortic valve is a trileaflet valve. There is moderate aortic valve sclerosis. Mildly restricted motion.    Tricuspid Valve: There is mild regurgitation.    IVC/SVC: Normal venous pressure at 3 mmHg.

## 2024-11-21 NOTE — HOSPITAL COURSE
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 NAEON. Neuro exam grossly stable. Pain control. Drain output ~225cc.  11/22: patient yesterday AM around 7:30 with exam change, developed L gaze, nonresponsive, aphasic. Stat CTH showed R cerebellar IPH. Short interval CTA 2 hours later with stable IPH, possible frontal contusion. CTP in afternoon with possible L anterior temporal decreased perfusion vs post surgical artifact. Patient placed on strict blood pressure control, cEEG, and milrinone gtt started in case of spasm. Patient with some mild improvement with milrinone, able to say some brief phrases and gaze preference less severe. cEEG negative, removed this AM. Currently has said name and some short phrases, is now able to overcome gaze preference and look to right, following commands reliably x4. Able to count fingers. Overall exam improving but aphasia remains concerning.  11/23: frequent short runs of Vtach while on milrinone. Neuro exam stable, without major improvement. MRI yesterday with edema in the L anterior temporal lobe and L posterior inferior frontal lobe with stable IPH of the frontal lobe and R superior cerebellum. After discussion with NSGY, plan to liberalize MAP goal to 100   11/24: NAEON. Pt exam stable this morning. Pts mentation improved. Now responding to simple questions and making sense.   11/25: NAEON. Neuro exam stable. Drain output 70cc. CTM in ICU.  11/26: NAEON. Patient states name, oriented to hospital with choices. Able to count fingers, but still severely aphasic overall. NGT in place, still npo per speech recs. Stable for transfer to NSGY floor.   11/27: NAEON. Ox2, more conversant today. Asking for something to eat. SLP advanced diet to pureed w/ thin liquids. Follows commands x 4. Document strict I/O, US TCD today with evidence of mild left MCA vasospasm. Working on dispo.   11/28: speech gradually improving, able to name keys and pen  and oriented x2. still has NGT, eating 75% lunch and 25% dinner per RN. I/O +150. TCDs yesterday R 2.33, L 3.46 peak velocity 129.   11/29: KARLA ROYALVSS. TCDs today pending. NGT removed overnight by patient. +245 I/O during day shift yesterday, undocumented overnight. Encourage PO fluids since NGT out. Tolerating current recommended diet per SLP. Exam stable. Pending placement.  11/30: ZIA. GENNYVSS. TCD yesterday c/f L MCA/DIANE vasospasm, repeat today pending. Pt has been clinically/neurologically stable. Tolerating diet but not hydrating much, I/O -605, 500cc NS bolus ordered. Planning for IPR placement.  12/1: NAEO. Neuro exam stable. Denies headache, nausea or vomiting. TCD wnl. Tolerating diet but not hydrating much, I/O around -1.1L, 500cc NS bolus ordered. Planning for IPR placement.  12/2: NAEO. I/O with -190cc, given 250cc bolus. TCD yesterday with mild vasospasm, reviewed by Dr. Simon and thought to be overall stable. Today's TCD pending. Diet advanced by SLP to soft and bite sized. Exam stable.   12/3: MARYANNEON. Net negative this am, 500cc bolus given. Nursing reports poor po intake. TCD yesterday with continued mild Left MCA vasospasm vs hyperemia. Pt noted to have oral thrush this am. Remains neuro stable  12/4: NAEON. I/O + 60 today. Goal euvolemic. Encourage PO intake. I/O every 4 hours. TCDs today with improvement in DIANE and MCA velocities, mild increase in right and left lindegaard ratio. Repeat TCD tomorrow. Exam stable.   12/5: NAEO. I/O - 230 overnight. Given 250 cc bolus today. Continue to monitor. TCD today pending. Slightly more drowsy on AM rounds. Repeat CTH done today, appears stable, read pending. Exam otherwise stable.   12/6: NAEO. I/O -250. Given another 250cc bolus. Remains drowsy but following all commands.   12/7: NAEO. AFVSS. Patient less drowsy on exam this AM, oriented x 2, briskly following commands, sitting up eating breakfast. Pending IPR.   12/8: Neuro exam grossly stable.  Pending SNF. No acute events   12/9: CAROL ROYALVSS. Denies any complaints this AM. Sitting up eating breakfast. Exam stable. Pending dispo.   12/10: KARLA VSS. Remains neuro stable. SNF pending   12/11: CAROL ROYALVSS. Pt with new YOUNG, in the setting of poor PO water intake, given a fluid bolus with plan to repeat BMP. Lisinopril held for mild hyperkalemia. Exam stable. Pending auth for IPR.   12/12: Hyperkalemia worsened on PM repeat to 5.9, decreased to 5.2 this AM. Remains on cardiac monitoring. Hospital Medicine consulted for assistance. YOUNG improving. Pt given repeated prompts to drink water, will do so with assistance and observation. Exam stable. Pending peer to peer for rehab.   12/13: AFSS. Hospital Medicine following for hyperkalemia, 5.3 on most recent repeat. Pt still pending a BM. Had not been receiving the ordered suppositories. Abdomin x-ray grossly WNL. Suppository given this AM. Will continue to monitor. Exam stable. Appeal submitted for auth for IPR.   12/14: KARLA WHELANS. Neuro stable. BM yesterday and this morning. Pending placement.  12/15: LESON. Neuro stable. Pending placement  12/16: KARLA ROYALVSS. Potassium stable, management per HM. Appeal for rehab approved. Plan for discharge to [facility] rehab today. Neuro exam stable. Patient awake, alert, oriented x 2. Following commands. Asking to eat. Called daughter x3 to provide care update, no answer. Left voicemail for return call. Able to contact [place] Santana, second number listed in the chart, for an update.     General: well developed, well nourished, no distress.   Head: normocephalic, cranial incision well healed  Mental Status: Awake, Alert, Oriented x 2  Speech: Clear with content appropriate to conversation, mild aphasia  Cranial nerves: face symmetric, CN II-XII grossly intact.   Eyes: pupils equal, round, reactive to light, EOMI.  Sensory: intact to light touch throughout  Motor Strength: Moves all extremities spontaneously with good tone. At  least antigravity strength in upper and lower extremities. No focal weakness identified. No abnormal movements seen.   Follows commands x 4 extremities  Skin: Skin is warm, dry and intact.

## 2024-11-21 NOTE — NURSING
Patient arrived to [facility] from [facility] OR     Time of anesthesia transfer of care (and end time for our post-op monitoring):    Was the CRNA HANDOFF tool used?: yes    Surgical incision care orders in? Yes    Drain care orders in? yes drain to full suction    HOB orders: HOB>30 degrees   (name of hospital or home) by (mode of transportation & company name)     Report received from: {diff [id number]}    Type of stroke/diagnosis:  left craniotomy aneurysm clipping       Current symptoms: Opens eyes to pain, moved all extremitates spontaneously, not following commands, and on non-rebreather    Skin Assessment done: Yes  Wounds noted:L crani incision  *If wounds noted, was Wound Care consulted? Y/N  *If wounds noted, LDA placed? Y/N  Skin Assessment Verified by:  GLEN Angulo and GLEN Grande Completed? Yes, npo   Patient Belongings on Admit: none    NCC notified: PADMINI Santiago

## 2024-11-21 NOTE — ASSESSMENT & PLAN NOTE
- SBP <140   - Nicardipine gtt and prn hydralazine   - Echo:   Left Ventricle: The left ventricle is normal in size. Normal wall thickness. There is normal systolic function with a visually estimated ejection fraction of 60 - 65%. There is normal diastolic function.    Right Ventricle: Normal right ventricular cavity size. Wall thickness is normal. Systolic function is normal.    Left Atrium: Left atrium is severely dilated.    Right Atrium: Right atrium is dilated.    Aortic Valve: The aortic valve is a trileaflet valve. There is moderate aortic valve sclerosis. Mildly restricted motion.    Tricuspid Valve: There is mild regurgitation.    IVC/SVC: Normal venous pressure at 3 mmHg.

## 2024-11-21 NOTE — BRIEF OP NOTE
[facility] - Surgery ([facility])  Brief Operative Note    SUMMARY     Surgery Date: 11/20/2024     Surgeons and Role:     * Sejal Simon MD - Primary     * Indu Schultz MD - Resident - Assisting     * Shankar Gannon MD        Pre-op Diagnosis:  Cerebral aneurysm, nonruptured [I67.1]    Post-op Diagnosis:  Post-Op Diagnosis Codes:     * Cerebral aneurysm, nonruptured [I67.1]    Procedure(s) (LRB):  CRANIOTOMY, WITH ANEURYSM CLIPPING W/ STEALTH (Left)  CRANIOTOMY, FOR ANEURYSM OF VERTEBROBASILAR OR CAROTID CIRCULATION (Left)  DISSECTION, NERVE, USING OPERATING MICROSCOPE (Left)    Anesthesia: General    Implants:  Implant Name Type Inv. Item Serial No.  Lot No. LRB No. Used Action   CLIP T2 11.5 STRAIGHT 12MM - [id number]  CLIP T2 11.5 STRAIGHT 12MM    Left 1 Implanted   18 STRAIGHT PERMANENT ANEURYSM CLIP      Left 1 Implanted   18 CURVED PERMANENT ANEURYSM CLIP      Left 1 Implanted   CLIP SUGITA MINI SDWRD CRV 86 - [id number]  CLIP SUGITA MINI SDWRD CRV 86    Left 1 Implanted   5.2 MORE CURVED MINI PERMANENT ANEURYSM CLIP      Left 1 Implanted   CLIP ANEUR T2 8.5 STRAIGHT 7MM - [id number]  CLIP ANEUR T2 8.5 STRAIGHT 7MM  [employer]  Left 1 Implanted   DURA MATRIX ONLAY PLUS 3X3 - [id number]  DURA MATRIX ONLAY PLUS 3X3  [employer]. [id number] Left 1 Implanted   PLATE BONE BUR HOLE COVER 10MM - [id number]  PLATE BONE BUR HOLE COVER 10MM  [employer].  Left 2 Implanted   PLATE BONE 6 HOLE DBL Y SHAPE - [id number]  PLATE BONE 6 HOLE DBL Y SHAPE  [employer].  Left 1 Implanted   PLATE BONE 2X2 HOLE SM BOX - [id number]  PLATE BONE 2X2 HOLE SM BOX  [employer].  Left 1 Implanted   SCREW UN3 AXS SD 1.5X4MM - [id number]  SCREW UN3 AXS SD 1.5X4MM  [employer].  Left 18 Implanted       Operative Findings: L pterional crani, 7 clips applied in proximity to large irregular L MCA bifurcation aneurysm and other adjacent small aneurysms, 1 clip applied across neck of  aneurysm just proximal to anterior choroidal. Neuromonitoring stable.     Estimated Blood Loss: * No values recorded between 11/20/2024  2:01 PM and 11/20/2024  8:37 PM *    Estimated Blood Loss has not been documented. EBL = 300.         Specimens:   Specimen (24h ago, onward)      None            [id number]

## 2024-11-21 NOTE — H&P
[facility]  Neurocritical Care  History & Physical    Admit Date: 11/20/2024  Service Date: 11/20/2024  Length of Stay: 0    Subjective:     Chief Complaint: Cerebral aneurysm    History of Present Illness: Isabel Coats is a 73-year-old female past medical history of hypertension, obstructive sleep apnea on CPAP, suggestive heart failure, obesity, history of gastric cancer status post chemoradiation and DVT in currently on Eliquis. She presents to [facility] L pterional crani, 7 clips applied in proximity to large irregular L MCA bifurcation aneurysm and other adjacent small aneurysms, 1 clip applied across neck of aneurysm just proximal to anterior choroidal.  She also has a current smoker she was initially referred to Neurology for workup for dizziness. She had been complaining of vertigo multiple times a day for the past several months, associated with shortness of breath, palpitation, nausea, headache, and feeling faint. She is admitted to [facility] for a higher level of care.       Past Medical History:   Diagnosis Date    YOUNG (acute kidney injury) 10/15/2021    Anemia     2018    Arthritis     BMI 60.0-69.9, adult     Cataract     Chronic diastolic congestive heart failure 01/27/2021    Coronary artery disease involving native coronary artery of native heart without angina pectoris 11/15/2024    Deep vein thrombosis     Depression     Dry eye syndrome     DVT of deep femoral vein, right 05/29/2023    Gastric adenocarcinoma 05/25/2021    GERD (gastroesophageal reflux disease)     Glaucoma suspect     History of gastric ulcer     History of psychiatric hospitalization     Hx of psychiatric care     Celexa, no recall of charted Effexor, Lexapro, Desyrel prescriptions    Hyperlipidemia     Hypertension     Hypothyroidism     Morbid obesity     Neuromuscular disorder     Sleep apnea     Thyroid disease      Past Surgical History:   Procedure Laterality Date    APPENDECTOMY      CATARACT EXTRACTION W/   INTRAOCULAR LENS IMPLANT Bilateral     MFIOL OU    CORONARY ANGIOGRAPHY Bilateral 02/24/2021    Procedure: ANGIOGRAM, CORONARY ARTERY;  Surgeon: Cresencio Ridley MD;  Location: [facility] CATH LAB;  Service: Cardiology;  Laterality: Bilateral;  Covid test needed - ok per Danay SSCU. Pt. w/o transportation or family    ENDOSCOPIC ULTRASOUND OF UPPER GASTROINTESTINAL TRACT N/A 03/17/2021    Procedure: ULTRASOUND, UPPER GI TRACT, ENDOSCOPIC;  Surgeon: Gerald Anaya MD;  Location: [facility] ENDO (2ND FLR);  Service: Endoscopy;  Laterality: N/A;  Pt unable to get a ride for swab. Will do rapid test at 8:30 - ttr    ENDOSCOPIC ULTRASOUND OF UPPER GASTROINTESTINAL TRACT N/A 01/13/2022    Procedure: ULTRASOUND, UPPER GI TRACT, ENDOSCOPIC;  Surgeon: Kevin Carballo MD;  Location: [facility] (2ND FLR);  Service: Endoscopy;  Laterality: N/A;  blood thinner approval received, see telephone encounter 1/7/22-BB  rapid  instructions given verbally and sent to address on file in pt's chart-BB    ENDOSCOPIC ULTRASOUND OF UPPER GASTROINTESTINAL TRACT N/A 10/11/2022    Procedure: ULTRASOUND, UPPER GI TRACT, ENDOSCOPIC;  Surgeon: Dequan Ridley MD;  Location: [facility] ENDO (2ND FLR);  Service: Endoscopy;  Laterality: N/A;    ENDOSCOPIC ULTRASOUND OF UPPER GASTROINTESTINAL TRACT N/A 01/24/2024    Procedure: ULTRASOUND, UPPER GI TRACT, ENDOSCOPIC;  Surgeon: Kevin Carballo MD;  Location: [facility] ENDO (2ND FLR);  Service: Endoscopy;  Laterality: N/A;    ENDOSCOPIC ULTRASOUND OF UPPER GASTROINTESTINAL TRACT N/A 03/05/2024    Procedure: ULTRASOUND, UPPER GI TRACT, ENDOSCOPIC;  Surgeon: Kevin Carballo MD;  Location: [facility] ENDO (2ND FLR);  Service: Endoscopy;  Laterality: N/A;  1/25 portal instr-Repeat theEUS at the next available appointment because the preparation was poor. 48hrs of liquid. Ozempic 7 day hold-eliquis approved Dr. Latanya STINSON 12/12/2023-tt  2/28-precall complete-pt verbalized udnerstanding of holding Oz     ESOPHAGOGASTRODUODENOSCOPY N/A 02/05/2021    Procedure: EGD (ESOPHAGOGASTRODUODENOSCOPY);  Surgeon: Matthew Hernadez MD;  Location: [facility] ENDO (2ND FLR);  Service: Endoscopy;  Laterality: N/A;  BMI-58    Wt: 361#     COVID test at [facility] on 2/2-GT    ESOPHAGOGASTRODUODENOSCOPY N/A 03/17/2021    Procedure: EGD (ESOPHAGOGASTRODUODENOSCOPY);  Surgeon: Gerald Anaya MD;  Location: [facility] ENDO (2ND FLR);  Service: Endoscopy;  Laterality: N/A;    ESOPHAGOGASTRODUODENOSCOPY N/A 05/25/2021    Procedure: EGD (ESOPHAGOGASTRODUODENOSCOPY);  Surgeon: Kevin Carballo MD;  Location: [facility] ENDO (2ND FLR);  Service: Endoscopy;  Laterality: N/A;  ESD 2 hours  Full COVID vaccination on file. ttr    ESOPHAGOGASTRODUODENOSCOPY N/A 01/13/2022    Procedure: EGD (ESOPHAGOGASTRODUODENOSCOPY);  Surgeon: Kevin Carballo MD;  Location: [facility] ENDO (2ND FLR);  Service: Endoscopy;  Laterality: N/A;  blood thinner approval, see telephone encounter 1/7/22-BB  rapid  instructions given verbally and sent to address on file in pt's chart-BB    ESOPHAGOGASTRODUODENOSCOPY N/A 10/11/2022    Procedure: EGD (ESOPHAGOGASTRODUODENOSCOPY);  Surgeon: Dequan Ridley MD;  Location: [facility] ENDO (2ND FLR);  Service: Endoscopy;  Laterality: N/A;  She is do for EGD/EUS now. Personal history of gastric cancer. Ca Coats #[id number].   Thanks,   Kevin Fuentes MD    Pt is fully vaccinated-DS  9/14/22-Approval to hold Davian rec'd from Dr. Pelaez (see telephone encounter 9/14/22)-DS  9/14/22-Ins    ESOPHAGOGASTRODUODENOSCOPY N/A 01/24/2024    Procedure: EGD (ESOPHAGOGASTRODUODENOSCOPY);  Surgeon: Kevin Carballo MD;  Location: [facility] ENDO (2ND FLR);  Service: Endoscopy;  Laterality: N/A;  12/8/23: instructions sent via portal. eliquis approval from MD Pelaez in telephone encounter (12/12/23) -GD  1/9-precall complete-MS    ESOPHAGOGASTRODUODENOSCOPY N/A 03/05/2024    Procedure: EGD (ESOPHAGOGASTRODUODENOSCOPY);  Surgeon: Kevin Carballo MD;  Location: [facility]  [facility] (2ND FLR);  Service: Endoscopy;  Laterality: N/A;    EYE SURGERY      HYSTEROSCOPIC POLYPECTOMY OF UTERUS  01/10/2024    Procedure: POLYPECTOMY, UTERUS, HYSTEROSCOPIC;  Surgeon: Pina Pickens MD;  Location: [facility] OR;  Service: OB/GYN;;    HYSTEROSCOPY WITH DILATION AND CURETTAGE OF UTERUS N/A 01/10/2024    Procedure: HYSTEROSCOPY, WITH DILATION AND CURETTAGE OF UTERUS;  Surgeon: Pina Pickens MD;  Location: [facility] OR;  Service: OB/GYN;  Laterality: N/A;    INJECTION OF ANESTHETIC AGENT AROUND NERVE Left 07/10/2018    Procedure: BLOCK, NERVE;  Surgeon: Samantha Vidal MD;  Location: [facility] PAIN MGT;  Service: Pain Management;  Laterality: Left;  Genicular     INJECTION OF ANESTHETIC AGENT AROUND NERVE Left 07/19/2019    Procedure: Block, Nerve NERVE BLOCK GENICULAR WITH PHENOL 6%;  Surgeon: Samantha Vidal MD;  Location: [facility] PAIN MGT;  Service: Pain Management;  Laterality: Left;  NEEDS CONSENT    INSERTION OF TUNNELED CENTRAL VENOUS CATHETER (CVC) WITH SUBCUTANEOUS PORT N/A 07/09/2021    Procedure: INSERTION, PORT-A-CATH;  Surgeon: Mario Paz MD;  Location: [facility] CATH LAB;  Service: Radiology;  Laterality: N/A;  PORT PLACEMENT    RADIOFREQUENCY ABLATION Left 06/19/2020    Procedure: RADIOFREQUENCY ABLATION LEFT GENICULAR;  Surgeon: Tevin Clark MD;  Location: [facility] PAIN MGT;  Service: Pain Management;  Laterality: Left;  Left Genicular RFA    RADIOFREQUENCY ABLATION Left 01/22/2021    Procedure: RADIOFREQUENCY ABLATION LEFT GENICULAR;  Surgeon: Tevin Clark MD;  Location: [facility] PAIN MGT;  Service: Pain Management;  Laterality: Left;    RADIOFREQUENCY ABLATION Left 07/30/2021    Procedure: RADIOFREQUENCY ABLATION, GENICULAR COOLED NEED CONSENT;  Surgeon: Tevin Clark MD;  Location: [facility] PAIN MGT;  Service: Pain Management;  Laterality: Left;    RADIOFREQUENCY ABLATION Left 04/22/2022    Procedure: RADIOFREQUENCY ABLATION, GENICULAR COOLED , LEFT;  Surgeon: Tevin Clark MD;  Location:  [facility] PAIN MGT;  Service: Pain Management;  Laterality: Left;    RADIOFREQUENCY ABLATION Left 12/23/2022    Procedure: RADIOFREQUENCY ABLATION LEFT GENICULAR COOLED CLEARED TO HOLD ELIQUIS 3 DAYS  NEEDS CONSENT;  Surgeon: Tevin Clark MD;  Location: [facility] PAIN MGT;  Service: Pain Management;  Laterality: Left;    TONSILLECTOMY        Current Facility-Administered Medications on File Prior to Encounter   Medication Dose Route Frequency Provider Last Rate Last Admin    0.9%  NaCl infusion   Intravenous Continuous Tevin Clark MD 0 mL/hr at 01/13/22 1055 New Bag at 03/05/24 1000    0.9%  NaCl infusion   Intravenous Continuous Kevin Carballo MD        sodium chloride 0.9% flush 10 mL  10 mL Intravenous PRN Kevin Carballo MD         Current Outpatient Medications on File Prior to Encounter   Medication Sig Dispense Refill    acetaminophen (TYLENOL) 500 MG tablet Take by mouth daily as needed.      atorvastatin (LIPITOR) 20 MG tablet Take 1 tablet (20 mg total) by mouth once daily. 90 tablet 11    B-complex with vitamin C (Z-BEC OR EQUIV) tablet Take 1 tablet by mouth once daily.       citalopram (CELEXA) 10 MG tablet Take 1 tablet (10 mg total) by mouth once daily. 90 tablet 11    furosemide (LASIX) 80 MG tablet Take 1 tablet (80 mg total) by mouth 2 (two) times daily. 180 tablet 11    gabapentin (NEURONTIN) 300 MG capsule Take 2 capsules (600 mg total) by mouth 3 (three) times daily. 540 capsule 2    lisinopriL (PRINIVIL,ZESTRIL) 40 MG tablet Take 1 tablet (40 mg total) by mouth once daily. 90 tablet 11    multivitamin with minerals tablet Take 1 tablet by mouth once daily.       pantoprazole (PROTONIX) 40 MG tablet Take 1 tablet (40 mg total) by mouth once daily. 90 tablet 11    semaglutide (OZEMPIC) 1 mg/dose (4 mg/3 mL) Inject 1 mg into the skin every 7 days. 9 each 11    CALCIUM CITRATE-VITAMIN D2 ORAL Take 1 tablet by mouth once daily.         Allergies: Patient has no known allergies.  Family  History   Problem Relation Name Age of Onset    No Known Problems Mother      No Known Problems Father      Colon cancer Neg Hx      Esophageal cancer Neg Hx       Social History     Tobacco Use    Smoking status: Some Days     Current packs/day: 0.00     Average packs/day: 1 pack/day for 53.9 years (53.7 ttl pk-yrs)     Types: Cigarettes     Start date:      Last attempt to quit: 2023     Years since quittin.9    Smokeless tobacco: Never    Tobacco comments:     currently smoking <5 cigarettes most days   Substance Use Topics    Alcohol use: Yes     Alcohol/week: 1.0 standard drink of alcohol     Types: 1 Glasses of wine per week     Comment: rarely    Drug use: No     Review of Systems    Unable to perform due to LOC    Objective:     Vitals:    Pulse: 98  BP: 124/68  Resp: (!) 26  SpO2: 95 %    Temp  Min: 98.1 °F (36.7 °C)  Max: 98.1 °F (36.7 °C)  Pulse  Min: 71  Max: 98  BP  Min: 124/68  Max: 131/61  MAP (mmHg)  Min: 88  Max: 88  Resp  Min: 21  Max: 26  SpO2  Min: 95 %  Max: 96 %    No intake/output data recorded.            Physical Exam      Physical Exam:  GA: sedated , comfortable, no acute distress.   HEENT: No scleral icterus or JVD.   Pulmonary: Clear to auscultation A/L. .  Cardiac: RRR S1 & S2 w/o rubs/murmurs/gallops.   Abdominal: Bowel sounds present x 4.   Skin: drain in place to scalp   Neuro:  --GCS: E4 V3 M6  --Mental Status:  sedated post op opens eyes spontaneously, incomprehensible words, follows some simple commands    --CN II-XII grossly intact as best can be assessed   --Pupils 3mm, PERRL.   --Corneal reflex, gag, cough intact.  --moves all ext spont     Unable to test judgment, insight, fund of knowledge, hearing, shoulder shrug, tongue protrusion, coordination, gait due to level of consciousness.       Today I personally reviewed pertinent medications, lines/drains/airways, imaging, laboratory results, notably:  CTA    Assessment/Plan:     Neuro  * Cerebral aneurysm  - Admit  to NCC   - s/p L pterional crani, 7 clips applied in proximity to large irregular L MCA bifurcation aneurysm and other adjacent small aneurysms, 1 clip applied across neck of aneurysm just proximal to anterior choroidal.   - CTA  - Q 1 vitals  - Q 1 neuro checks  - Keppra 500 q 1 2  - dex 4 q 6 x 3 doses  - SBP <140        Cardiac/Vascular  Pure hypercholesterolemia  - lipid panel     Chronic diastolic congestive heart failure    Left Ventricle: The left ventricle is normal in size. Normal wall thickness. There is normal systolic function with a visually estimated ejection fraction of 60 - 65%. There is normal diastolic function.    Right Ventricle: Normal right ventricular cavity size. Wall thickness is normal. Systolic function is normal.    Left Atrium: Left atrium is severely dilated.    Right Atrium: Right atrium is dilated.    Aortic Valve: The aortic valve is a trileaflet valve. There is moderate aortic valve sclerosis. Mildly restricted motion.    Tricuspid Valve: There is mild regurgitation.    IVC/SVC: Normal venous pressure at 3 mmHg.    Essential hypertension  - SBP <140   - Nicardipine gtt and prn hydralazine   - Echo:   Left Ventricle: The left ventricle is normal in size. Normal wall thickness. There is normal systolic function with a visually estimated ejection fraction of 60 - 65%. There is normal diastolic function.    Right Ventricle: Normal right ventricular cavity size. Wall thickness is normal. Systolic function is normal.    Left Atrium: Left atrium is severely dilated.    Right Atrium: Right atrium is dilated.    Aortic Valve: The aortic valve is a trileaflet valve. There is moderate aortic valve sclerosis. Mildly restricted motion.    Tricuspid Valve: There is mild regurgitation.    IVC/SVC: Normal venous pressure at 3 mmHg.    Other  Debility  - PT/OT eval and treat           The patient is being Prophylaxed for:  Venous Thromboembolism with: Mechanical  Stress Ulcer with: PPI  Ventilator  Pneumonia with: not applicable    Activity Orders            Turn patient starting at 11/20 2200    Elevate HOB starting at 11/20 2112    Diet NPO: NPO starting at 11/20 2112    Bed rest starting at 11/20 2040          Full Code      Critical condition in that Patient has a condition that poses threat to life and bodily function: s/p clipping, HTN, CHF     47 minutes of Critical care time was spent personally by me on the following activities: development of treatment plan with patient or surrogate and bedside caregivers, discussions with consultants, evaluation of patient's response to treatment, examination of patient, ordering and performing treatments and interventions, ordering and review of laboratory studies, ordering and review of radiographic studies, pulse oximetry, antibiotic titration if applicable, vasopressor titration if applicable, re-evaluation of patient's condition. This critical care time did not overlap with that of any other provider or involve time for any procedures. There is high probability for acute neurological change leading to clinical and possibly life-threatening deterioration requiring highest level of physician preparedness for urgent intervention.      Kaushal Santiago, NP  Neurocritical Care  [facility]

## 2024-11-21 NOTE — PT/OT/SLP PROGRESS
Occupational Therapy      Patient Name:  Ca Coats   MRN:  [id number]    Patient not seen today:     1st attempt: 8:17am -  Pt off the floor for CT scan following mental status change and new L side gaze preference.  Per chart review, at 10am, pt presented with additional change in intermittent command following with decreased level of responsiveness.     Thank you for your consult, will follow-up 11/22 if pt medically ready. .    11/21/2024

## 2024-11-21 NOTE — PROGRESS NOTES
[facility] Critical [facility]  Neurosurgery  Progress Note    Subjective:     History of Present Illness: Isabel Coats is a 73-year-old female past medical history of hypertension, obstructive sleep apnea on CPAP, suggestive heart failure, obesity, history of gastric cancer status post chemoradiation and DVT in currently on Eliquis. She presents to [facility] L pterional crani, 7 clips applied in proximity to large irregular L MCA bifurcation aneurysm and other adjacent small aneurysms, 1 clip applied across neck of aneurysm just proximal to anterior choroidal.  She also has a current smoker she was initially referred to Neurology for workup for dizziness. She had been complaining of vertigo multiple times a day for the past several months, associated with shortness of breath, palpitation, nausea, headache, and feeling faint. She is admitted to [facility] for a higher level of care.     Post-Op Info:  Procedure(s) (LRB):  CRANIOTOMY, WITH ANEURYSM CLIPPING W/ STEALTH (Left)  CRANIOTOMY, FOR ANEURYSM OF VERTEBROBASILAR OR CAROTID CIRCULATION (Left)  DISSECTION, NERVE, USING OPERATING MICROSCOPE (Left)   1 Day Post-Op   Interval History: 11/21: POD1 s/p L pterional for clipping of L MCA bifurcation and ant choroidal aneurysms. NAEON. Neuro exam grossly stable. Pain control. Drain output ~225cc.    Medications:  Continuous Infusions:   0.9% NaCl   Intravenous Continuous 50 mL/hr at 11/21/24 0605 Rate Verify at 11/21/24 0605    nicardipine  0-15 mg/hr Intravenous Continuous 25 mL/hr at 11/21/24 0854 5 mg/hr at 11/21/24 0854     Scheduled Meds:   ceFAZolin (Ancef) IV (PEDS and ADULTS)  2 g Intravenous Q8H    dexAMETHasone  4 mg Intravenous Q6H    [START ON 11/22/2024] heparin (porcine)  5,000 Units Subcutaneous Q8H    indocyanine green  25 mg Intravenous Once    levETIRAcetam (Keppra) IV (PEDS and ADULTS)  500 mg Intravenous Q12H    mupirocin   Nasal BID    senna-docusate 8.6-50 mg  1 tablet Oral Daily     PRN Meds:  Current  Facility-Administered Medications:     acetaminophen, 650 mg, Oral, Q4H PRN    bisacodyL, 10 mg, Rectal, Daily PRN    hydrALAZINE, 10 mg, Intravenous, Q6H PRN    magnesium oxide, 800 mg, Oral, PRN    magnesium oxide, 800 mg, Oral, PRN    morphine, 2 mg, Intravenous, Q6H PRN    naloxone, 0.4 mg, Intravenous, PRN    ondansetron, 4 mg, Intravenous, Q12H PRN    oxyCODONE, 5 mg, Oral, Q6H PRN    potassium bicarbonate, 35 mEq, Oral, PRN    potassium bicarbonate, 50 mEq, Oral, PRN    potassium bicarbonate, 60 mEq, Oral, PRN    potassium, sodium phosphates, 2 packet, Oral, PRN    potassium, sodium phosphates, 2 packet, Oral, PRN    potassium, sodium phosphates, 2 packet, Oral, PRN    sodium chloride 0.9%, 10 mL, Intravenous, PRN     Review of Systems  Objective:     Weight: 123.4 kg (272 lb)  Body mass index is 43.9 kg/m².  Vital Signs (Most Recent):  Temp: 98.5 °F (36.9 °C) (11/21/24 0305)  Pulse: 75 (11/21/24 0600)  Resp: (!) 43 (11/21/24 0600)  BP: 139/68 (11/21/24 0853)  SpO2: 96 % (11/21/24 0600) Vital Signs (24h Range):  Temp:  [98.5 °F (36.9 °C)-99 °F (37.2 °C)] 98.5 °F (36.9 °C)  Pulse:  [] 75  Resp:  [16-43] 43  SpO2:  [95 %-99 %] 96 %  BP: (115-163)/(56-88) 139/68  Arterial Line BP: (121-173)/(47-79) 154/60                              Closed/Suction Drain 11/20/24 1930 Scalp Accordion 10 Fr. (Active)   Site Description Unable to view 11/21/24 0505   Dressing Type Gauze 11/21/24 0505   Dressing Status Old drainage 11/21/24 0505   Dressing Intervention Integrity maintained 11/21/24 0505   Status To bulb suction 11/21/24 0505   Output (mL) 60 mL 11/21/24 0605            Urethral Catheter 11/20/24 1215 Non-latex 16 Fr. (Active)   Site Assessment Clean 11/21/24 0505   Collection Container Urimeter 11/21/24 0505   Securement Method secured to top of thigh w/ adhesive device 11/21/24 0505   Catheter Care Performed yes 11/21/24 0505   Reason for Continuing Urinary Catheterization Critically ill in ICU and requiring  "hourly monitoring of intake/output 11/21/24 0505   CAUTI Prevention Bundle Securement Device in place with 1" slack;Intact seal between catheter & drainage tubing;Drainage bag/urimeter off the floor;Sheeting clip in use;No dependent loops or kinks;Drainage bag/urimeter not overfilled (<2/3 full);Drainage bag/urimeter below bladder 11/21/24 0305   Output (mL) 200 mL 11/21/24 0605          Physical Exam         Neurosurgery Physical Exam    E3V4M6  AOx1-2  PERRL  EOMI  Fcx4 AG nonfocal      Significant Labs:  Recent Labs   Lab 11/20/24  2101 11/21/24  0330   * 156*    141   K 3.8 3.4*    110   CO2 19* 19*   BUN 20 16   CREATININE 0.9 0.8   CALCIUM 8.8 8.9   MG  --  1.9     Recent Labs   Lab 11/20/24  1711 11/20/24  2101 11/21/24  0330   WBC  --  16.65* 21.21*   HGB  --  10.4* 10.4*   HCT 31* 33.3* 32.0*   PLT  --  257 246     Recent Labs   Lab 11/21/24  0330   INR 0.9   APTT 24.8     Microbiology Results (last 7 days)       ** No results found for the last 168 hours. **          All pertinent labs from the last 24 hours have been reviewed.    Significant Diagnostics:  I have reviewed all pertinent imaging results/findings within the past 24 hours.  Assessment/Plan:     * Cerebral aneurysm  Isabel Coats is a 73-year-old female past medical history of hypertension, obstructive sleep apnea on CPAP, suggestive heart failure, obesity, history of gastric cancer status post chemoradiation and DVT in currently on Eliquis. She presents to [facility] L pterional crani, 7 clips applied in proximity to large irregular L MCA bifurcation aneurysm and other adjacent small aneurysms, 1 clip applied across neck of aneurysm just proximal to anterior choroidal.  She also has a current smoker she was initially referred to Neurology for workup for dizziness. She had been complaining of vertigo multiple times a day for the past several months, associated with shortness of breath, palpitation, nausea, headache, and feeling " faint. She is admitted to [facility] for a higher level of care.      Imaging:  CTA Head 11/20: expected post-op changes    Plans:  -Admit to ICU  -q1h neurocheck  -All labs and imaging were reviewed  -Strict SBP <140  -Keep the drain to suction  -Continue to monitor respiratory status  -keppra 500 BID x1 wk  -Pain control  -PT/OT/OOB  -Continue to monitor clinically, notify NSGY immediately with any changes in neuro status     Discussed with staff Dr. Alfred Garza MD  Neurosurgery  [facility] - Neuro Critical Care

## 2024-11-21 NOTE — PLAN OF CARE
Problem: SLP  Goal: SLP Goal  Description: Speech Language Pathology Goals  Goals expected to be met by 12/5:  1) Pt will participate in ongoing assessment of swallow function to determine least restrictive diet.  2) Pt will participate in speech, language, cognitive evaluation to determine possible goals and poc.  Outcome: Progressing     Recommending NPO. Ongoing swallow assessment warranted.

## 2024-11-21 NOTE — NURSING
"Alicia, NP notified that during bedside handoff this RN felt that pts exam has declined since seen by NSGY around 6:30am. Pt is very somnolent, opens eyes to repeated verbal command , left eye ptosis and left gaze preference noted, pupils around 3-4mm and left appears slightly more sluggish but reactive. Follows very intermittent commands , HURT spont and semi -purposeful. As per observed by ST when pt tries to speak and swallow during exam she has some left facial "twitching" as well as under left eye movements , pt is aphasic and dysarthric, also of note as pts cardene was weaned to off while I was examining pt became bradycardic in mid 40's , ANISA. Alicia to come to bedside.  "

## 2024-11-21 NOTE — PT/OT/SLP EVAL
Speech Language Pathology Evaluation  Bedside Swallow    Patient Name:  Ca Coats   MRN:  [id number]  Admitting Diagnosis: Cerebral aneurysm    Recommendations:                 General Recommendations:  Dysphagia therapy, Speech language evaluation, and Cognitive-linguistic evaluation  Diet recommendations:  NPO, NPO   Aspiration Precautions: Strict aspiration precautions   General Precautions: Standard, aspiration, NPO  Communication strategies:  provide increased time to answer and go to room if call light pushed    Assessment:     Ca Coats is a 73 y.o. female with an SLP diagnosis of Dysphagia.  Pending speech/language evaluation.    History:     Past Medical History:   Diagnosis Date    YOUNG (acute kidney injury) 10/15/2021    Anemia     2018    Arthritis     BMI 60.0-69.9, adult     Cataract     Chronic diastolic congestive heart failure 01/27/2021    Coronary artery disease involving native coronary artery of native heart without angina pectoris 11/15/2024    Deep vein thrombosis     Depression     Dry eye syndrome     DVT of deep femoral vein, right 05/29/2023    Gastric adenocarcinoma 05/25/2021    GERD (gastroesophageal reflux disease)     Glaucoma suspect     History of gastric ulcer     History of psychiatric hospitalization     Hx of psychiatric care     Celexa, no recall of charted Effexor, Lexapro, Desyrel prescriptions    Hyperlipidemia     Hypertension     Hypothyroidism     Morbid obesity     Neuromuscular disorder     Sleep apnea     Thyroid disease        Past Surgical History:   Procedure Laterality Date    APPENDECTOMY      CATARACT EXTRACTION W/  INTRAOCULAR LENS IMPLANT Bilateral     MFIOL OU    CORONARY ANGIOGRAPHY Bilateral 02/24/2021    Procedure: ANGIOGRAM, CORONARY ARTERY;  Surgeon: Cresencio Ridley MD;  Location: [facility] CATH LAB;  Service: Cardiology;  Laterality: Bilateral;  Covid test needed - ok per Danay MORENOU. Pt. w/o transportation or family     ENDOSCOPIC ULTRASOUND OF UPPER GASTROINTESTINAL TRACT N/A 03/17/2021    Procedure: ULTRASOUND, UPPER GI TRACT, ENDOSCOPIC;  Surgeon: Gerald Anaya MD;  Location: [facility] (2ND FLR);  Service: Endoscopy;  Laterality: N/A;  Pt unable to get a ride for swab. Will do rapid test at 8:30 - ttr    ENDOSCOPIC ULTRASOUND OF UPPER GASTROINTESTINAL TRACT N/A 01/13/2022    Procedure: ULTRASOUND, UPPER GI TRACT, ENDOSCOPIC;  Surgeon: Kevin Carballo MD;  Location: [facility] (2ND FLR);  Service: Endoscopy;  Laterality: N/A;  blood thinner approval received, see telephone encounter 1/7/22-BB  rapid  instructions given verbally and sent to address on file in pt's chart-BB    ENDOSCOPIC ULTRASOUND OF UPPER GASTROINTESTINAL TRACT N/A 10/11/2022    Procedure: ULTRASOUND, UPPER GI TRACT, ENDOSCOPIC;  Surgeon: Dequan Ridley MD;  Location: [facility] (2ND FLR);  Service: Endoscopy;  Laterality: N/A;    ENDOSCOPIC ULTRASOUND OF UPPER GASTROINTESTINAL TRACT N/A 01/24/2024    Procedure: ULTRASOUND, UPPER GI TRACT, ENDOSCOPIC;  Surgeon: Kevin Carballo MD;  Location: [facility] (2ND FLR);  Service: Endoscopy;  Laterality: N/A;    ENDOSCOPIC ULTRASOUND OF UPPER GASTROINTESTINAL TRACT N/A 03/05/2024    Procedure: ULTRASOUND, UPPER GI TRACT, ENDOSCOPIC;  Surgeon: Kevin Carballo MD;  Location: [facility] (2ND FLR);  Service: Endoscopy;  Laterality: N/A;  1/25 portal instr-Repeat theEUS at the next available appointment because the preparation was poor. 48hrs of liquid. Ozempic 7 day hold-eliquis approved Dr. Pelaez TE 12/12/2023-tt  2/28-precall complete-pt verbalized udnerstanding of holding Oz    ESOPHAGOGASTRODUODENOSCOPY N/A 02/05/2021    Procedure: EGD (ESOPHAGOGASTRODUODENOSCOPY);  Surgeon: Matthew Hernadez MD;  Location: [facility] (2ND FLR);  Service: Endoscopy;  Laterality: N/A;  BMI-58    Wt: 361#     COVID test at [facility] on 2/2-GT    ESOPHAGOGASTRODUODENOSCOPY N/A 03/17/2021    Procedure: EGD (ESOPHAGOGASTRODUODENOSCOPY);   Surgeon: Gerald Anaya MD;  Location: [facility] ENDO (2ND FLR);  Service: Endoscopy;  Laterality: N/A;    ESOPHAGOGASTRODUODENOSCOPY N/A 05/25/2021    Procedure: EGD (ESOPHAGOGASTRODUODENOSCOPY);  Surgeon: Kevin Carballo MD;  Location: [facility] ENDO (2ND FLR);  Service: Endoscopy;  Laterality: N/A;  ESD 2 hours  Full COVID vaccination on file. ttr    ESOPHAGOGASTRODUODENOSCOPY N/A 01/13/2022    Procedure: EGD (ESOPHAGOGASTRODUODENOSCOPY);  Surgeon: Kevin Carballo MD;  Location: [facility] END[facility] (2ND FLR);  Service: Endoscopy;  Laterality: N/A;  blood thinner approval, see telephone encounter 1/7/22-BB  rapid  instructions given verbally and sent to address on file in pt's chart-BB    ESOPHAGOGASTRODUODENOSCOPY N/A 10/11/2022    Procedure: EGD (ESOPHAGOGASTRODUODENOSCOPY);  Surgeon: Dequan Ridley MD;  Location: [facility] ENDO (2ND FLR);  Service: Endoscopy;  Laterality: N/A;  She is do for EGD/EUS now. Personal history of gastric cancer. Ca Coats #[id number].   Thanks,   Kevin Fuentes MD    Pt is fully vaccinated-DS  9/14/22-Approval to hold Eliquis rec'd from Dr. Pelaez (see telephone encounter 9/14/22)-DS  9/14/22-Ins    ESOPHAGOGASTRODUODENOSCOPY N/A 01/24/2024    Procedure: EGD (ESOPHAGOGASTRODUODENOSCOPY);  Surgeon: Kevin Carballo MD;  Location: [facility] ENDO (2ND FLR);  Service: Endoscopy;  Laterality: N/A;  12/8/23: instructions sent via portal. eliquis approval from MD Pelaez in telephone encounter (12/12/23) -GD  1/9-precall complete-MS    ESOPHAGOGASTRODUODENOSCOPY N/A 03/05/2024    Procedure: EGD (ESOPHAGOGASTRODUODENOSCOPY);  Surgeon: Kevin Carballo MD;  Location: [facility] END[facility] (2ND FLR);  Service: Endoscopy;  Laterality: N/A;    EYE SURGERY      HYSTEROSCOPIC POLYPECTOMY OF UTERUS  01/10/2024    Procedure: POLYPECTOMY, UTERUS, HYSTEROSCOPIC;  Surgeon: Pina Pickens MD;  Location: [facility];  Service: OB/GYN;;    HYSTEROSCOPY WITH DILATION AND CURETTAGE OF UTERUS N/A 01/10/2024    Procedure:  HYSTEROSCOPY, WITH DILATION AND CURETTAGE OF UTERUS;  Surgeon: Pina Pickens MD;  Location: [facility] OR;  Service: OB/GYN;  Laterality: N/A;    INJECTION OF ANESTHETIC AGENT AROUND NERVE Left 07/10/2018    Procedure: BLOCK, NERVE;  Surgeon: Samantha Vidal MD;  Location: [facility] PAIN MGT;  Service: Pain Management;  Laterality: Left;  Genicular     INJECTION OF ANESTHETIC AGENT AROUND NERVE Left 07/19/2019    Procedure: Block, Nerve NERVE BLOCK GENICULAR WITH PHENOL 6%;  Surgeon: Samantha Vidal MD;  Location: [facility] PAIN MGT;  Service: Pain Management;  Laterality: Left;  NEEDS CONSENT    INSERTION OF TUNNELED CENTRAL VENOUS CATHETER (CVC) WITH SUBCUTANEOUS PORT N/A 07/09/2021    Procedure: INSERTION, PORT-A-CATH;  Surgeon: Mario Paz MD;  Location: [facility] CATH LAB;  Service: Radiology;  Laterality: N/A;  PORT PLACEMENT    RADIOFREQUENCY ABLATION Left 06/19/2020    Procedure: RADIOFREQUENCY ABLATION LEFT GENICULAR;  Surgeon: Tevin Clark MD;  Location: [facility] PAIN MGT;  Service: Pain Management;  Laterality: Left;  Left Genicular RFA    RADIOFREQUENCY ABLATION Left 01/22/2021    Procedure: RADIOFREQUENCY ABLATION LEFT GENICULAR;  Surgeon: Tevin Clark MD;  Location: [facility] PAIN MGT;  Service: Pain Management;  Laterality: Left;    RADIOFREQUENCY ABLATION Left 07/30/2021    Procedure: RADIOFREQUENCY ABLATION, GENICULAR COOLED NEED CONSENT;  Surgeon: Tevin Clark MD;  Location: [facility] PAIN MGT;  Service: Pain Management;  Laterality: Left;    RADIOFREQUENCY ABLATION Left 04/22/2022    Procedure: RADIOFREQUENCY ABLATION, GENICULAR COOLED , LEFT;  Surgeon: Tevin Clark MD;  Location: [facility] PAIN MGT;  Service: Pain Management;  Laterality: Left;    RADIOFREQUENCY ABLATION Left 12/23/2022    Procedure: RADIOFREQUENCY ABLATION LEFT GENICULAR COOLED CLEARED TO HOLD ELIQUIS 3 DAYS  NEEDS CONSENT;  Surgeon: Tevin Clark MD;  Location: [facility] PAIN MGT;  Service: Pain Management;  Laterality: Left;     "TONSILLECTOMY     HPI: "Isabel Coats is a 73-year-old female past medical history of hypertension, obstructive sleep apnea on CPAP, suggestive heart failure, obesity, history of gastric cancer status post chemoradiation and DVT in currently on Eliquis. She presents to [facility] L pterional crani, 7 clips applied in proximity to large irregular L MCA bifurcation aneurysm and other adjacent small aneurysms, 1 clip applied across neck of aneurysm just proximal to anterior choroidal.  She also has a current smoker she was initially referred to Neurology for workup for dizziness. She had been complaining of vertigo multiple times a day for the past several months, associated with shortness of breath, palpitation, nausea, headache, and feeling faint. She is admitted to [facility] for a higher level of care. "    Social History: Patient lives with gaetano.    Prior Intubation HX:  11/20    Modified Barium Swallow: none on file.    Chest X-Rays: none this admit.    Prior diet: gaetano.    Occupation/hobbies/homemaking: [employer]    Subjective   Pt lethargic t/o evaluation.   RN agreeable to SLP session.    Pain/Comfort:  Pain Rating 1:  (no pain indicated)    Respiratory Status: Nasal cannula, flow 2 L/min    Objective:     Oral Musculature Evaluation  Oral Musculature: right weakness (rhythmic left pulling back noted during tasks)  Secretion Management: left corner drooling  Mucosal Quality: adequate  Mandibular Strength and Mobility: WFL  Oral Labial Strength and Mobility: impaired retraction (right)  Lingual Strength and Mobility: impaired protrusion, other (see comments) (tongue curled back at rest, unable to protrude)  Velar Elevation: WFL  Buccal Strength and Mobility: WFL  Volitional Cough: strong  Volitional Swallow: elicited  Voice Prior to PO Intake: dysarthric    Bedside Swallow Eval:   Consistencies Assessed:  Ice chip x1   Puree 1/8 tsp      Oral Phase:   Anterior loss  Decreased closure around utensil  No attempts to manipulate ice " chip    Pharyngeal Phase:   No swallow elicited with ice chip    Compensatory Strategies  None    Treatment: Pt seen at the bedside for evaluation of swallow function. Pt required frequent verbal and tactile cues to maintain awake/alertness, with eyes closed for majority of session. Pt oriented to self and place IND, requiring min-mod cues for situation. Pt with noted moderate word finding deficits and dysarthria t/o session. Pt able to respond to simple yes/no questions in function with ability to follow simple commands about 60% of the time. Pt with consistent tongue curling behavior at rest and throughout PO. Ice chip trial requiring oral suctioning to remove bolus after no attempts to manipulate or initiate a swallow. Eventual A-P transit of puree trial with generalized medial tongue pumping behavior and delayed swallow initiation. Pt required several cues to close mouth around utensil for each trial. RN notified of decreased alertness, speech/language deficits, facial twitches, tongue behaviors upon completion of session. Recommend NPO.    Goals:   Multidisciplinary Problems       SLP Goals          Problem: SLP    Goal Priority Disciplines Outcome   SLP Goal     SLP Progressing   Description: Speech Language Pathology Goals  Goals expected to be met by 12/5:  1) Pt will participate in ongoing assessment of swallow function to determine least restrictive diet.  2) Pt will participate in speech, language, cognitive evaluation to determine possible goals and poc.                               Plan:     Patient to be seen:  5 x/week   Plan of Care expires:  12/22/24  Plan of Care reviewed with:  patient   SLP Follow-Up:  Yes       Discharge recommendations:  High Intensity Therapy   Barriers to Discharge:  Level of Skilled Assistance Needed      Time Tracking:     SLP Treatment Date:      Speech Start Time:  0651  Speech Stop Time:  0706     Speech Total Time (min):  15 min    Billable Minutes: Eval Swallow and  Oral Function 15    11/21/2024

## 2024-11-21 NOTE — PLAN OF CARE
"[facility] Care Plan    POC reviewed with Ca Coats and family at 0300. Family verbalized understanding. Questions and concerns addressed. No acute events today. Pt progressing toward goals. Will continue to monitor. See below and flowsheets for full assessment and VS info.     - NAEON   - patient is still confused intermittently after procedure, but is still obeying commands; NSGY aware.   - patient was placed in restraints due to pulling @ arterial lines.   - total drain output was 365      Is this a stroke patient? no    Neuro:  [place] Coma Scale  Best Eye Response: 4-->(E4) spontaneous  Best Motor Response: 6-->(M6) obeys commands  Best Verbal Response: 4-->(V4) confused  Iker Coma Scale Score: 14  Pupil PERRLA: yes     24 hr Temp:  [98.5 °F (36.9 °C)-99 °F (37.2 °C)]     CV:   Rhythm: normal sinus rhythm  BP goals:   SBP < 140  MAP > 65    Resp:           Plan: N/A    GI/:     Diet/Nutrition Received: NPO  Last Bowel Movement: 11/19/24  Voiding Characteristics: urethral catheter (bladder)    Intake/Output Summary (Last 24 hours) at 11/21/2024 0625  Last data filed at 11/21/2024 0605  Gross per 24 hour   Intake 3168.84 ml   Output 3580 ml   Net -411.16 ml          Labs/Accuchecks:  Recent Labs   Lab 11/21/24  0330   WBC 21.21*   RBC 3.21*   HGB 10.4*   HCT 32.0*         Recent Labs   Lab 11/21/24  0330      K 3.4*   CO2 19*      BUN 16   CREATININE 0.8   ALKPHOS 89   ALT 9*   AST 19   BILITOT 0.4      Recent Labs   Lab 11/21/24  0330   INR 0.9   APTT 24.8    No results for input(s): "CPK", "CPKMB", "TROPONINI", "MB" in the last 168 hours.    Electrolytes: Contraindicated only because after being given pain meds the patient is to drowsy to consume replacements orally   Accuchecks: none    Gtts:   0.9% NaCl   Intravenous Continuous 50 mL/hr at 11/21/24 0605 Rate Verify at 11/21/24 0605    nicardipine  0-15 mg/hr Intravenous Continuous 12.5 mL/hr at 11/21/24 0605 2.5 mg/hr at " 24 0605       LDA/Wounds:    Domenico Risk Assessment  Sensory Perception: 2-->very limited  Moisture: 3-->occasionally moist  Activity: 1-->bedfast  Mobility: 2-->very limited  Nutrition: 2-->probably inadequate  Friction and Shear: 2-->potential problem  Domenico Score: 12  Is your domenico score 12 or less? yes heel boots on          Restraints:   Restraint Order  Length of Order: Order good for next 24 hours or when removed.  Date that the current order will : 24  Time that the current order will : 0505  Order Upon Application: Yes    [facility]

## 2024-11-22 LAB
ALBUMIN SERPL BCP-MCNC: 3 G/DL (ref 3.5–5.2)
ALP SERPL-CCNC: 76 U/L (ref 40–150)
ALT SERPL W/O P-5'-P-CCNC: 13 U/L (ref 10–44)
ANION GAP SERPL CALC-SCNC: 11 MMOL/L (ref 8–16)
AST SERPL-CCNC: 38 U/L (ref 10–40)
BASOPHILS # BLD AUTO: 0.06 K/UL (ref 0–0.2)
BASOPHILS NFR BLD: 0.2 % (ref 0–1.9)
BILIRUB SERPL-MCNC: 0.5 MG/DL (ref 0.1–1)
BUN SERPL-MCNC: 12 MG/DL (ref 8–23)
CALCIUM SERPL-MCNC: 8.8 MG/DL (ref 8.7–10.5)
CHLORIDE SERPL-SCNC: 116 MMOL/L (ref 95–110)
CO2 SERPL-SCNC: 19 MMOL/L (ref 23–29)
CREAT SERPL-MCNC: 0.8 MG/DL (ref 0.5–1.4)
DIFFERENTIAL METHOD BLD: ABNORMAL
EOSINOPHIL # BLD AUTO: 0 K/UL (ref 0–0.5)
EOSINOPHIL NFR BLD: 0 % (ref 0–8)
ERYTHROCYTE [DISTWIDTH] IN BLOOD BY AUTOMATED COUNT: 13 % (ref 11.5–14.5)
EST. GFR  (NO RACE VARIABLE): >60 ML/MIN/1.73 M^2
GLUCOSE SERPL-MCNC: 130 MG/DL (ref 70–110)
HCT VFR BLD AUTO: 26.2 % (ref 37–48.5)
HGB BLD-MCNC: 8.8 G/DL (ref 12–16)
IMM GRANULOCYTES # BLD AUTO: 0.22 K/UL (ref 0–0.04)
IMM GRANULOCYTES NFR BLD AUTO: 0.8 % (ref 0–0.5)
LYMPHOCYTES # BLD AUTO: 1 K/UL (ref 1–4.8)
LYMPHOCYTES NFR BLD: 3.5 % (ref 18–48)
MAGNESIUM SERPL-MCNC: 1.8 MG/DL (ref 1.6–2.6)
MCH RBC QN AUTO: 33 PG (ref 27–31)
MCHC RBC AUTO-ENTMCNC: 33.6 G/DL (ref 32–36)
MCV RBC AUTO: 98 FL (ref 82–98)
MONOCYTES # BLD AUTO: 1.5 K/UL (ref 0.3–1)
MONOCYTES NFR BLD: 5.2 % (ref 4–15)
NEUTROPHILS # BLD AUTO: 25 K/UL (ref 1.8–7.7)
NEUTROPHILS NFR BLD: 90.3 % (ref 38–73)
NRBC BLD-RTO: 0 /100 WBC
PHOSPHATE SERPL-MCNC: 2.5 MG/DL (ref 2.7–4.5)
PLATELET # BLD AUTO: 218 K/UL (ref 150–450)
PMV BLD AUTO: 10.7 FL (ref 9.2–12.9)
POCT GLUCOSE: 126 MG/DL (ref 70–110)
POTASSIUM SERPL-SCNC: 3.9 MMOL/L (ref 3.5–5.1)
PROT SERPL-MCNC: 5.8 G/DL (ref 6–8.4)
RBC # BLD AUTO: 2.67 M/UL (ref 4–5.4)
SODIUM SERPL-SCNC: 146 MMOL/L (ref 136–145)
WBC # BLD AUTO: 27.74 K/UL (ref 3.9–12.7)

## 2024-11-22 PROCEDURE — 80053 COMPREHEN METABOLIC PANEL: CPT | Performed by: NURSE PRACTITIONER

## 2024-11-22 PROCEDURE — 99900035 HC TECH TIME PER 15 MIN (STAT)

## 2024-11-22 PROCEDURE — 25000003 PHARM REV CODE 250

## 2024-11-22 PROCEDURE — 97535 SELF CARE MNGMENT TRAINING: CPT

## 2024-11-22 PROCEDURE — 84100 ASSAY OF PHOSPHORUS: CPT | Performed by: NURSE PRACTITIONER

## 2024-11-22 PROCEDURE — 25000003 PHARM REV CODE 250: Performed by: NURSE PRACTITIONER

## 2024-11-22 PROCEDURE — 20000000 HC ICU ROOM

## 2024-11-22 PROCEDURE — 83735 ASSAY OF MAGNESIUM: CPT | Performed by: NURSE PRACTITIONER

## 2024-11-22 PROCEDURE — 63600175 PHARM REV CODE 636 W HCPCS

## 2024-11-22 PROCEDURE — 95720 EEG PHY/QHP EA INCR W/VEEG: CPT | Mod: HCNC,,, | Performed by: STUDENT IN AN ORGANIZED HEALTH CARE EDUCATION/TRAINING PROGRAM

## 2024-11-22 PROCEDURE — 63600175 PHARM REV CODE 636 W HCPCS: Performed by: NURSE PRACTITIONER

## 2024-11-22 PROCEDURE — 92523 SPEECH SOUND LANG COMPREHEN: CPT

## 2024-11-22 PROCEDURE — 27000221 HC OXYGEN, UP TO 24 HOURS

## 2024-11-22 PROCEDURE — 92526 ORAL FUNCTION THERAPY: CPT

## 2024-11-22 PROCEDURE — 85025 COMPLETE CBC W/AUTO DIFF WBC: CPT | Performed by: NURSE PRACTITIONER

## 2024-11-22 PROCEDURE — 94761 N-INVAS EAR/PLS OXIMETRY MLT: CPT

## 2024-11-22 PROCEDURE — 99291 CRITICAL CARE FIRST HOUR: CPT | Mod: HCNC,,, | Performed by: PSYCHIATRY & NEUROLOGY

## 2024-11-22 PROCEDURE — 99223 1ST HOSP IP/OBS HIGH 75: CPT | Mod: ,,, | Performed by: PHYSICIAN ASSISTANT

## 2024-11-22 RX ORDER — SODIUM,POTASSIUM PHOSPHATES 280-250MG
2 POWDER IN PACKET (EA) ORAL
Status: DISCONTINUED | OUTPATIENT
Start: 2024-11-22 | End: 2024-11-27

## 2024-11-22 RX ORDER — LABETALOL HCL 20 MG/4 ML
10 SYRINGE (ML) INTRAVENOUS ONCE AS NEEDED
Status: DISCONTINUED | OUTPATIENT
Start: 2024-11-22 | End: 2024-11-23

## 2024-11-22 RX ORDER — MIDAZOLAM HYDROCHLORIDE 1 MG/ML
1 INJECTION, SOLUTION INTRAMUSCULAR; INTRAVENOUS ONCE AS NEEDED
Status: COMPLETED | OUTPATIENT
Start: 2024-11-22 | End: 2024-11-22

## 2024-11-22 RX ORDER — LANOLIN ALCOHOL/MO/W.PET/CERES
800 CREAM (GRAM) TOPICAL
Status: DISCONTINUED | OUTPATIENT
Start: 2024-11-22 | End: 2024-11-27

## 2024-11-22 RX ORDER — LABETALOL HCL 20 MG/4 ML
10 SYRINGE (ML) INTRAVENOUS ONCE AS NEEDED
Status: DISCONTINUED | OUTPATIENT
Start: 2024-11-22 | End: 2024-11-22

## 2024-11-22 RX ORDER — FENTANYL CITRATE 50 UG/ML
25 INJECTION, SOLUTION INTRAMUSCULAR; INTRAVENOUS ONCE AS NEEDED
Status: COMPLETED | OUTPATIENT
Start: 2024-11-22 | End: 2024-11-22

## 2024-11-22 RX ORDER — OXYCODONE HYDROCHLORIDE 5 MG/1
5 TABLET ORAL EVERY 6 HOURS PRN
Status: DISCONTINUED | OUTPATIENT
Start: 2024-11-22 | End: 2024-11-29

## 2024-11-22 RX ORDER — ACETAMINOPHEN 325 MG/1
650 TABLET ORAL EVERY 4 HOURS PRN
Status: DISCONTINUED | OUTPATIENT
Start: 2024-11-22 | End: 2024-11-29

## 2024-11-22 RX ORDER — POLYETHYLENE GLYCOL 3350 17 G/17G
17 POWDER, FOR SOLUTION ORAL DAILY
Status: DISCONTINUED | OUTPATIENT
Start: 2024-11-22 | End: 2024-11-24

## 2024-11-22 RX ORDER — AMOXICILLIN 250 MG
1 CAPSULE ORAL DAILY
Status: DISCONTINUED | OUTPATIENT
Start: 2024-11-23 | End: 2024-11-24

## 2024-11-22 RX ADMIN — MUPIROCIN: 20 OINTMENT TOPICAL at 08:11

## 2024-11-22 RX ADMIN — CEFAZOLIN 2 G: 2 INJECTION, POWDER, FOR SOLUTION INTRAMUSCULAR; INTRAVENOUS at 09:11

## 2024-11-22 RX ADMIN — BISACODYL 10 MG: 10 SUPPOSITORY RECTAL at 08:11

## 2024-11-22 RX ADMIN — OXYCODONE 5 MG: 5 TABLET ORAL at 05:11

## 2024-11-22 RX ADMIN — NICARDIPINE HYDROCHLORIDE 10 MG/HR: 0.2 INJECTION, SOLUTION INTRAVENOUS at 04:11

## 2024-11-22 RX ADMIN — LEVETIRACETAM 1000 MG: 100 INJECTION INTRAVENOUS at 08:11

## 2024-11-22 RX ADMIN — NICARDIPINE HYDROCHLORIDE 10 MG/HR: 0.2 INJECTION, SOLUTION INTRAVENOUS at 08:11

## 2024-11-22 RX ADMIN — POLYETHYLENE GLYCOL 3350 17 G: 17 POWDER, FOR SOLUTION ORAL at 04:11

## 2024-11-22 RX ADMIN — HEPARIN SODIUM 5000 UNITS: 5000 INJECTION INTRAVENOUS; SUBCUTANEOUS at 05:11

## 2024-11-22 RX ADMIN — CEFAZOLIN 2 G: 2 INJECTION, POWDER, FOR SOLUTION INTRAMUSCULAR; INTRAVENOUS at 01:11

## 2024-11-22 RX ADMIN — MILRINONE LACTATE IN DEXTROSE 0.5 MCG/KG/MIN: 200 INJECTION, SOLUTION INTRAVENOUS at 04:11

## 2024-11-22 RX ADMIN — SENNOSIDES AND DOCUSATE SODIUM 1 TABLET: 50; 8.6 TABLET ORAL at 08:11

## 2024-11-22 RX ADMIN — MILRINONE LACTATE IN DEXTROSE 0.5 MCG/KG/MIN: 200 INJECTION, SOLUTION INTRAVENOUS at 03:11

## 2024-11-22 RX ADMIN — MILRINONE LACTATE IN DEXTROSE 0.5 MCG/KG/MIN: 200 INJECTION, SOLUTION INTRAVENOUS at 09:11

## 2024-11-22 RX ADMIN — MIDAZOLAM HYDROCHLORIDE 1 MG: 2 INJECTION, SOLUTION INTRAMUSCULAR; INTRAVENOUS at 02:11

## 2024-11-22 RX ADMIN — OXYCODONE 5 MG: 5 TABLET ORAL at 09:11

## 2024-11-22 RX ADMIN — HEPARIN SODIUM 5000 UNITS: 5000 INJECTION INTRAVENOUS; SUBCUTANEOUS at 01:11

## 2024-11-22 RX ADMIN — POTASSIUM & SODIUM PHOSPHATES POWDER PACK 280-160-250 MG 2 PACKET: 280-160-250 PACK at 08:11

## 2024-11-22 RX ADMIN — POTASSIUM BICARBONATE 35 MEQ: 391 TABLET, EFFERVESCENT ORAL at 02:11

## 2024-11-22 RX ADMIN — Medication 800 MG: at 04:11

## 2024-11-22 RX ADMIN — NICARDIPINE HYDROCHLORIDE 7.5 MG/HR: 0.2 INJECTION, SOLUTION INTRAVENOUS at 02:11

## 2024-11-22 RX ADMIN — SODIUM CHLORIDE: 9 INJECTION, SOLUTION INTRAVENOUS at 08:11

## 2024-11-22 RX ADMIN — POTASSIUM & SODIUM PHOSPHATES POWDER PACK 280-160-250 MG 2 PACKET: 280-160-250 PACK at 05:11

## 2024-11-22 RX ADMIN — CEFAZOLIN 2 G: 2 INJECTION, POWDER, FOR SOLUTION INTRAMUSCULAR; INTRAVENOUS at 04:11

## 2024-11-22 RX ADMIN — Medication 800 MG: at 08:11

## 2024-11-22 RX ADMIN — MILRINONE LACTATE IN DEXTROSE 0.5 MCG/KG/MIN: 200 INJECTION, SOLUTION INTRAVENOUS at 10:11

## 2024-11-22 RX ADMIN — HEPARIN SODIUM 5000 UNITS: 5000 INJECTION INTRAVENOUS; SUBCUTANEOUS at 09:11

## 2024-11-22 RX ADMIN — MILRINONE LACTATE IN DEXTROSE 0.5 MCG/KG/MIN: 200 INJECTION, SOLUTION INTRAVENOUS at 12:11

## 2024-11-22 RX ADMIN — HYDRALAZINE HYDROCHLORIDE 10 MG: 20 INJECTION, SOLUTION INTRAMUSCULAR; INTRAVENOUS at 04:11

## 2024-11-22 RX ADMIN — FENTANYL CITRATE 25 MCG: 50 INJECTION, SOLUTION INTRAMUSCULAR; INTRAVENOUS at 06:11

## 2024-11-22 NOTE — SIGNIFICANT EVENT
Called to bedside    Pt is 74 yo who is status post L clipping of several aneurysms, complicated  Post operatively with L brain dysfunction (L gaze preference overcome w vor, low fluency) and R cerebellar ich.     CT perfusion reviewed with neurosurgery. Automated read showing L anterior temporal perfusion mismatch, although this may be artifactual.     EEG with L slowing intermittently.     Plan discussed with neurosurgery. With symptoms of L brain dysfunction and possible L sided perfusion deficit, will start a trial of milrinone. Will avoid hypertension in setting of cerebellar ich and post craniotomy. Prn fluid bolus to promote cerebral perfusion.     Critical condition in that Patient has a condition that poses threat to life and bodily function: as above     30 additional minutes of Critical care time was spent personally by me on the following activities: development of treatment plan with patient or surrogate and bedside caregivers, discussions with consultants, evaluation of patient's response to treatment, examination of patient, ordering and performing treatments and interventions, ordering and review of laboratory studies, ordering and review of radiographic studies, pulse oximetry, antibiotic titration if applicable, vasopressor titration if applicable, re-evaluation of patient's condition. This critical care time did not overlap with that of any other provider or involve time for any procedures. There is high probability for acute neurological change leading to clinical and possibly life-threatening deterioration requiring highest level of physician preparedness for urgent intervention.

## 2024-11-22 NOTE — PLAN OF CARE
Problem: Adult Inpatient Plan of Care  Goal: Plan of Care Review  Outcome: Progressing  Goal: Patient-Specific Goal (Individualized)  Outcome: Progressing  Goal: Absence of Hospital-Acquired Illness or Injury  Outcome: Progressing  Goal: Optimal Comfort and Wellbeing  Outcome: Progressing  Goal: Readiness for Transition of Care  Outcome: Progressing     Problem: Bariatric Environmental Safety  Goal: Safety Maintained with Care  Outcome: Progressing     Problem: Wound  Goal: Optimal Coping  Outcome: Progressing  Goal: Optimal Functional Ability  Outcome: Progressing  Goal: Absence of Infection Signs and Symptoms  Outcome: Progressing  Goal: Optimal Pain Control and Function  Outcome: Progressing  Goal: Skin Health and Integrity  Outcome: Progressing  Goal: Optimal Wound Healing  Outcome: Progressing     Problem: Infection  Goal: Absence of Infection Signs and Symptoms  Outcome: Progressing     Problem: Stroke, Intracerebral Hemorrhage  Goal: Optimal Coping  Outcome: Progressing  Goal: Optimal Cerebral Tissue Perfusion  Outcome: Progressing  Goal: Optimal Cognitive Function  Outcome: Progressing  Goal: Effective Communication Skills  Outcome: Progressing  Goal: Optimal Functional Ability  Outcome: Progressing  Goal: Optimal Pain Control and Function  Outcome: Progressing  Goal: Effective Oxygenation and Ventilation  Outcome: Progressing  Goal: Improved Sensorimotor Function  Outcome: Progressing  Goal: Effective Urinary Elimination  Outcome: Progressing     Problem: Fall Injury Risk  Goal: Absence of Fall and Fall-Related Injury  Outcome: Progressing     Problem: Skin Injury Risk Increased  Goal: Skin Health and Integrity  Outcome: Progressing     Problem: Restraint, Nonviolent  Goal: Absence of Harm or Injury  Outcome: Progressing

## 2024-11-22 NOTE — PROGRESS NOTES
[facility] - Neuro Critical Care  Neurocritical Care  Progress Note    Admit Date: 11/20/2024  Service Date: 11/22/2024  Length of Stay: 2    Subjective:     Chief Complaint: Cerebral aneurysm    History of Present Illness: Isabel Coats is a 73-year-old female past medical history of hypertension, obstructive sleep apnea on CPAP, suggestive heart failure, obesity, history of gastric cancer status post chemoradiation and DVT in currently on Eliquis. She presents to [facility] L pterional crani, 7 clips applied in proximity to large irregular L MCA bifurcation aneurysm and other adjacent small aneurysms, 1 clip applied across neck of aneurysm just proximal to anterior choroidal.  She also has a current smoker she was initially referred to Neurology for workup for dizziness. She had been complaining of vertigo multiple times a day for the past several months, associated with shortness of breath, palpitation, nausea, headache, and feeling faint. She is admitted to [facility] for a higher level of care.     Hospital Course: 11/21/2024 noted to have intermittentleft facial twitching and decreased mental status.   11/22/2024 left gaze preference. CT perfusion with decreased flow left insula    Review of Symptoms: encephalopathic cannot participate    Medications:  Continuous Infusions:   0.9% NaCl   Intravenous Continuous 50 mL/hr at 11/22/24 1005 Rate Verify at 11/22/24 1005    milrinone 20mg/100ml D5W (200mcg/ml)  0.5 mcg/kg/min Intravenous Continuous 18.5 mL/hr at 11/22/24 1005 0.5 mcg/kg/min at 11/22/24 1005    nicardipine  0-15 mg/hr Intravenous Continuous   Stopped at 11/22/24 0602     Scheduled Meds:   ceFAZolin (Ancef) IV (PEDS and ADULTS)  2 g Intravenous Q8H    heparin (porcine)  5,000 Units Subcutaneous Q8H    indocyanine green  25 mg Intravenous Once    levETIRAcetam (Keppra) IV (PEDS and ADULTS)  1,000 mg Intravenous Q12H    mupirocin   Nasal BID    senna-docusate 8.6-50 mg  1 tablet Oral Daily     PRN Meds:.  Current  Facility-Administered Medications:     acetaminophen, 650 mg, Oral, Q4H PRN    bisacodyL, 10 mg, Rectal, Daily PRN    hydrALAZINE, 10 mg, Intravenous, Q6H PRN    magnesium oxide, 800 mg, Oral, PRN    magnesium oxide, 800 mg, Oral, PRN    morphine, 2 mg, Intravenous, Q6H PRN    naloxone, 0.4 mg, Intravenous, PRN    ondansetron, 4 mg, Intravenous, Q12H PRN    oxyCODONE, 5 mg, Oral, Q6H PRN    potassium bicarbonate, 35 mEq, Oral, PRN    potassium bicarbonate, 50 mEq, Oral, PRN    potassium bicarbonate, 60 mEq, Oral, PRN    potassium, sodium phosphates, 2 packet, Oral, PRN    potassium, sodium phosphates, 2 packet, Oral, PRN    potassium, sodium phosphates, 2 packet, Oral, PRN    sodium chloride 0.9%, 10 mL, Intravenous, PRN    OBJECTIVE:   Vital Signs (Most Recent):   Temp: 98.6 °F (37 °C) (11/22/24 0705)  Pulse: 73 (11/22/24 1005)  Resp: (!) 23 (11/22/24 1005)  BP: (!) 112/56 (11/22/24 1005)  SpO2: 95 % (11/22/24 1005)    Vital Signs (24h Range):   Temp:  [97.9 °F (36.6 °C)-98.6 °F (37 °C)] 98.6 °F (37 °C)  Pulse:  [] 73  Resp:  [21-42] 23  SpO2:  [92 %-100 %] 95 %  BP: ()/(43-96) 112/56  Arterial Line BP: ()/(36-91) 136/44    ICP/CPP (Last 24h):        I & O (Last 24h):   Intake/Output Summary (Last 24 hours) at 11/22/2024 1040  Last data filed at 11/22/2024 1005  Gross per 24 hour   Intake 3699.95 ml   Output 1310 ml   Net 2389.95 ml     Physical Exam:  GA: drowsy, comfortable, no acute distress.   HEENT: No scleral icterus or JVD. Neck supple  Pulmonary: Air entry equal to auscultation A/P/L. Wheezing no, crackles no  Cardiac: RRR, S1 & S2 w/o rubs/murmurs/gallops.   Abdominal: soft, non-tender, bowel sounds present x 4. No appreciable hepatosplenomegaly.  Skin: No jaundice, rashes, or visible lesions.  Neuro:  --GCS: 10  --Mental Status:  lethargic, follows  simple commands.  Incomprehensible words   --Pupils 3.5 mm  Left gaze preference  Right HH  --Corneal reflex not done in this arousable  patient, gag, cough intact.  Moves bilateral UE and LE symmetrically  MSK:  No edema in UE and LE    Vent Data:          Lines/Drains/Airway:          PowerPort A Cath Single Lumen 07/09/21 1135 right internal jugular (Active)      Arterial Line 11/20/24 1431 Left Radial (Active)   Site Assessment Clean;Dry;Intact;No redness;No swelling;No warmth;No drainage 11/22/24 0301   Line Status Pulsatile blood flow 11/22/24 0301   Art Line Waveform Square wave test performed 11/22/24 0301   Arterial Line Interventions Leveled;Zeroed and calibrated;Connections checked and tightened;Flushed per protocol 11/22/24 0301   Color/Movement/Sensation Capillary refill less than 3 sec 11/22/24 0301   Dressing Type Central line dressing;CHG impregnated dressing/sponge 11/22/24 0301   Dressing Status Clean;Dry;Intact 11/22/24 0301   Dressing Intervention Sterile dressing change 11/22/24 0301   Dressing Change Due 11/28/24 11/22/24 0301   Tubing Change Due 11/24/24 11/21/24 1901           Closed/Suction Drain 11/20/24 1930 Scalp Accordion 10 Fr. (Active)   Site Description Unable to view 11/22/24 0301   Dressing Type Gauze 11/22/24 0301   Dressing Status Old drainage 11/22/24 0301   Dressing Intervention Integrity maintained 11/22/24 0301   Drainage Bloody;Serosanguineous 11/22/24 0301   Status Other (Comment) 11/22/24 0301   Output (mL) 0 mL 11/22/24 0705            NG/OG Tube 11/21/24 1800 Right nostril (Active)   Intake (mL) 60 mL 11/22/24 0805   Water Bolus (mL) 30 mL 11/22/24 0805   Intake (mL) - Formula Tube Feeding 0 11/22/24 1005       Female External Urinary Catheter w/ Suction 11/21/24 1400 (Active)   Skin perineum cleansed w/ soap and water;female external urine collection device repositioned 11/22/24 0301   Tolerance no signs/symptoms of discomfort 11/22/24 0301   Suction Continuous suction at 60 mmHg 11/21/24 1901   Date of last wick change 11/21/24 11/21/24 1901   Output (mL) 50 mL 11/22/24 0905     Nutrition/Tube Feeds (if  "NPO state why): NPO     Labs:  ABG:   No results for input(s): "PH", "PO2", "PCO2", "HCO3", "POCSATURATED", "BE" in the last 24 hours.    BMP:  Recent Labs   Lab 11/22/24 0228   *   K 3.9   *   CO2 19*   BUN 12   CREATININE 0.8   *   MG 1.8   PHOS 2.5*     LFT:   Lab Results   Component Value Date    AST 38 11/22/2024    ALT 13 11/22/2024    ALKPHOS 76 11/22/2024    BILITOT 0.5 11/22/2024    ALBUMIN 3.0 (L) 11/22/2024    PROT 5.8 (L) 11/22/2024     CBC:   Lab Results   Component Value Date    WBC 27.74 (H) 11/22/2024    HGB 8.8 (L) 11/22/2024    HCT 26.2 (L) 11/22/2024    MCV 98 11/22/2024     11/22/2024     Microbiology x 7d:   Microbiology Results (last 7 days)       ** No results found for the last 168 hours. **          Imaging:  CT head 11/21 - right cerebellar ICH  I personally reviewed the above image.  Today I independently reviewed pertinent medications, lines/drains/airways, imaging, cardiology results, laboratory results, microbiology results, notably:   ASSESSMENT/PLAN:     Active Hospital Problems    Diagnosis    *Cerebral aneurysm    Tobacco dependency    Chronic diastolic congestive heart failure    Pure hypercholesterolemia    Debility    Essential hypertension      Neuro:   Post clipping of multiple cerebral aneurysms  Interval cerebellar ICH  Plan for CTA/CTV head. Negative  for venous sinus thrombosis  cEEG  r/o status epilepticus - reviewed with epilepsy team, no seizures  Consider MRI brain - neuro exam is out of proportion to perfusion deficit on CTP  Na goal 140-150  Keppra 500mg [id number]    Pulmonary:   Saturating >92% on room air    Cardiac:   MAP goals 65-90mmhg  On Milrinone for concern for VSP causing perfusion    Renal:    Making urine  BUN/Cr <20    ID:   Afebrile, leucocytosis    Hem/Onc:   Stable hh and plt count    Endocrine:    BS stable will monitor    Fluids/Electrolytes/Nutrition/GI:     Lines:  Art +  CVC NA  ETT NA  Tatum NA  NG NA  PEG " NA    Proph:  DVT:SCD/ no heparin   Constipation:  Last output:bowel regimen as needed  PUP: NA  VAP:NA    Critical condition in that Patient has a condition that poses threat to life and bodily function: ICH, cerebral edema, seizure     38 minutes of Critical care time was spent personally by me on the following activities: development of treatment plan with patient or surrogate and bedside caregivers, discussions with consultants, evaluation of patient's response to treatment, examination of patient, ordering and performing treatments and interventions, ordering and review of laboratory studies, ordering and review of radiographic studies, pulse oximetry, antibiotic titration if applicable, vasopressor titration if applicable, re-evaluation of patient's condition. This critical care time did not overlap with that of any other provider or involve time for any procedures. There is high probability for acute neurological change leading to clinical and possibly life-threatening deterioration requiring highest level of physician preparedness for urgent intervention.    Gregor Velazquez MD, [facility]  Neurocritical care attending

## 2024-11-22 NOTE — PROGRESS NOTES
[facility] - Neuro Critical Care  Neurosurgery  Progress Note    Subjective:     History of Present Illness: Isabel Coats is a 73-year-old female past medical history of hypertension, obstructive sleep apnea on CPAP, suggestive heart failure, obesity, history of gastric cancer status post chemoradiation and DVT in currently on Eliquis. She presents to [facility] L pterional crani, 7 clips applied in proximity to large irregular L MCA bifurcation aneurysm and other adjacent small aneurysms, 1 clip applied across neck of aneurysm just proximal to anterior choroidal.  She also has a current smoker she was initially referred to Neurology for workup for dizziness. She had been complaining of vertigo multiple times a day for the past several months, associated with shortness of breath, palpitation, nausea, headache, and feeling faint. She is admitted to [facility] for a higher level of care.     Post-Op Info:  Procedure(s) (LRB):  CRANIOTOMY, WITH ANEURYSM CLIPPING W/ STEALTH (Left)  CRANIOTOMY, FOR ANEURYSM OF VERTEBROBASILAR OR CAROTID CIRCULATION (Left)  DISSECTION, NERVE, USING OPERATING MICROSCOPE (Left)   2 Days Post-Op   Interval History: 11/22: patient yesterday AM around 7:30 with exam change, developed L gaze, nonresponsive, aphasic. Stat CTH showed R cerebellar IPH. Short interval CTA 2 hours later with stable IPH, possible frontal contusion. CTP in afternoon with possible L anterior temporal decreased perfusion vs post surgical artifact. Patient placed on strict blood pressure control, cEEG, and milrinone gtt started in case of spasm. Patient with some mild improvement with milrinone, able to say some brief phrases and gaze preference less severe. cEEG negative, removed this AM. Currently has said name and some short phrases, is now able to overcome gaze preference and look to right, following commands reliably x4. Able to count fingers. Overall exam improving but aphasia remains concerning.    Medications:  Continuous  Infusions:   0.9% NaCl   Intravenous Continuous 50 mL/hr at 11/22/24 1205 Rate Verify at 11/22/24 1205    milrinone 20mg/100ml D5W (200mcg/ml)  0.5 mcg/kg/min Intravenous Continuous 18.5 mL/hr at 11/22/24 1205 0.5 mcg/kg/min at 11/22/24 1205    nicardipine  0-15 mg/hr Intravenous Continuous 12.5 mL/hr at 11/22/24 1205 2.5 mg/hr at 11/22/24 1205     Scheduled Meds:   ceFAZolin (Ancef) IV (PEDS and ADULTS)  2 g Intravenous Q8H    heparin (porcine)  5,000 Units Subcutaneous Q8H    levETIRAcetam (Keppra) IV (PEDS and ADULTS)  1,000 mg Intravenous Q12H    mupirocin   Nasal BID    polyethylene glycol  17 g Per NG tube Daily    [START ON 11/23/2024] senna-docusate 8.6-50 mg  1 tablet Per NG tube Daily     PRN Meds:  Current Facility-Administered Medications:     acetaminophen, 650 mg, Per NG tube, Q4H PRN    bisacodyL, 10 mg, Rectal, Daily PRN    hydrALAZINE, 10 mg, Intravenous, Q6H PRN    magnesium oxide, 800 mg, Per NG tube, PRN    magnesium oxide, 800 mg, Per NG tube, PRN    midazolam, 1 mg, Intravenous, Once PRN    morphine, 2 mg, Intravenous, Q6H PRN    naloxone, 0.4 mg, Intravenous, PRN    ondansetron, 4 mg, Intravenous, Q12H PRN    oxyCODONE, 5 mg, Per NG tube, Q6H PRN    potassium bicarbonate, 35 mEq, Per NG tube, PRN    potassium bicarbonate, 50 mEq, Per NG tube, PRN    potassium bicarbonate, 60 mEq, Per NG tube, PRN    potassium, sodium phosphates, 2 packet, Per NG tube, PRN    potassium, sodium phosphates, 2 packet, Per NG tube, PRN    potassium, sodium phosphates, 2 packet, Per NG tube, PRN    sodium chloride 0.9%, 10 mL, Intravenous, PRN     Review of Systems  Objective:     Weight: 123.4 kg (272 lb)  Body mass index is 43.9 kg/m².  Vital Signs (Most Recent):  Temp: 98.8 °F (37.1 °C) (11/22/24 1105)  Pulse: (!) 114 (11/22/24 1205)  Resp: (!) 30 (11/22/24 1205)  BP: (!) 114/59 (11/22/24 1205)  SpO2: (!) 92 % (11/22/24 1205) Vital Signs (24h Range):  Temp:  [97.9 °F (36.6 °C)-98.8 °F (37.1 °C)] 98.8 °F (37.1  °C)  Pulse:  [] 114  Resp:  [21-42] 30  SpO2:  [92 %-100 %] 92 %  BP: ()/(43-96) 114/59  Arterial Line BP: ()/(36-91) 142/57     Date 11/22/24 0700 - 11/23/24 0659   Shift 5607-7075 7044-5275 4481-1634 24 Hour Total   INTAKE   I.V.(mL/kg) 420.8(3.4)   420.8(3.4)   NG/GT 90   90   Shift Total(mL/kg) 510.8(4.1)   510.8(4.1)   OUTPUT   Urine(mL/kg/hr) 50   50   Drains 250   250   Shift Total(mL/kg) 300(2.4)   300(2.4)   Weight (kg) 123.4 123.4 123.4 123.4                            Closed/Suction Drain 11/20/24 1930 Scalp Accordion 10 Fr. (Active)   Site Description Unable to view 11/22/24 0301   Dressing Type Gauze 11/22/24 0301   Dressing Status Old drainage 11/22/24 0301   Dressing Intervention Integrity maintained 11/22/24 0301   Drainage Bloody;Serosanguineous 11/22/24 0301   Status Other (Comment) 11/22/24 0301   Output (mL) 250 mL 11/22/24 1205            NG/OG Tube 11/21/24 1800 Right nostril (Active)   Intake (mL) 60 mL 11/22/24 0805   Water Bolus (mL) 30 mL 11/22/24 0805   Intake (mL) - Formula Tube Feeding 0 11/22/24 1205       Female External Urinary Catheter w/ Suction 11/21/24 1400 (Active)   Skin perineum cleansed w/ soap and water;female external urine collection device repositioned 11/22/24 0301   Tolerance no signs/symptoms of discomfort 11/22/24 0301   Suction Continuous suction at 60 mmHg 11/21/24 1901   Date of last wick change 11/21/24 11/21/24 1901   Output (mL) 0 mL 11/22/24 1005          Physical Exam         Neurosurgery Physical Exam    [id number]  Alert, not oriented (said name for RN) but saying some brief phrases. Able to count fingers  L gaze preference able to overcome midline  Pupils reactive, left sluggish  Following commands antigravity x4 reliably    Significant Labs:  Recent Labs   Lab 11/21/24  1554 11/21/24  2214 11/22/24  0228   * 144* 130*   * 146* 146*   K 3.4* 3.2* 3.9   * 117* 116*   CO2 20* 19* 19*   BUN 12 11 12   CREATININE 0.8 0.8 0.8    CALCIUM 9.5 8.7 8.8   MG 1.9 1.7 1.8     Recent Labs   Lab 11/20/24  2101 11/21/24  0330 11/22/24  0228   WBC 16.65* 21.21* 27.74*   HGB 10.4* 10.4* 8.8*   HCT 33.3* 32.0* 26.2*    246 218     Recent Labs   Lab 11/21/24  0330 11/21/24  0904   INR 0.9 1.0   APTT 24.8 25.4     Microbiology Results (last 7 days)       ** No results found for the last 168 hours. **          All pertinent labs from the last 24 hours have been reviewed.    Significant Diagnostics:  CT: CT Head Without Contrast    Result Date: 11/21/2024  CT perfusion demonstrates impaired perfusion in the left anterior temporal lobe near the region of aneurysm clipping.  No large ischemic penumbra. Overall evaluation for vasospasm is limited due to the limited CTA technique. No residual aneurysm in the left MCA or the left PCOM distribution.  Unchanged basilar tip aneurysm. Stable postoperative changes of left griffin-craniotomy for aneurysm clipping.  Scattered areas of hemorrhage are unchanged.  No new edema, major vascular distribution infarct, or hydrocephalus. Electronically signed by resident: Zeus Lyle Date:    11/21/2024 Time:    19:07 Electronically signed by: Prasanth Caballero MD Date:    11/21/2024 Time:    20:24   MRI: No results found in the last 24 hours.  Assessment/Plan:     * Cerebral aneurysm  Isabel Coats is a 73-year-old female past medical history of hypertension, obstructive sleep apnea on CPAP, suggestive heart failure, obesity, history of gastric cancer status post chemoradiation and DVT in currently on Eliquis. She presents to [facility] L pterional crani, 7 clips applied in proximity to large irregular L MCA bifurcation aneurysm and other adjacent small aneurysms, 1 clip applied across neck of aneurysm just proximal to anterior choroidal.  She also has a current smoker she was initially referred to Neurology for workup for dizziness. She had been complaining of vertigo multiple times a day for the past several months,  associated with shortness of breath, palpitation, nausea, headache, and feeling faint. She is admitted to [facility] for a higher level of care.      Imaging:  CTA Head 11/20: expected post-op changes  CTH 11/21 8 AM: right sided cerebellar IPH  CTA 11/21 10 AM: stable IPH, possible frontal contusion  CTP 11/21 4 PM: possible left anterior temporal perfusion deficit in territory of branches distal to MCA clips     Plans:  -Admitted to ICU  -q1h neurocheck  -All labs and imaging were reviewed  -Strict SBP <120, MAP >65  -Keep the drain to suction  -Continue to monitor respiratory status - ABG yesterday with normal CO2 at time of AMS  -keppra 1g BID. cEEG negative  -plan for MRI this afternoon  -continue milrinone, currently at 0.5 - seems to have mild improvement with it started. keep euvolemic to net +. F/u TCDS per [facility]  -Pain control  -PT/OT/OOB  -Continue to monitor clinically, notify NSGY immediately with any changes in neuro status     Discussed with staff Dr. Alfred Schultz MD  Neurosurgery  [facility] - Neuro Critical Care

## 2024-11-22 NOTE — CARE UPDATE
L facial twitching noted, MD Antoine at bedside aware. No red button hooked up to EEG machine.    Pt very restless, pulling at lines, and speaking unintelligibly. Lines dislodged or inadvertently removed, replaced. Pt bilaterally restrained. HR increased to 120-140 when agitated, NSR at rest. Milrinone increased to 0.5, cardene titrated to attempt to maintain SBP <120 and MAP 65-85.  BP cuff moved to L leg.     Both pupils sluggish, unequal size L>R using pupillometer. L gaze at rest but able to cross midline to look in direction of .     JARET Carrillo made aware of assessment. No new orders.

## 2024-11-22 NOTE — OP NOTE
Craniotomy for MCA Aneurysm Clipping       DATE OF SURGERY:  11/20/2024  Surgeon: Sejal Simon MD,MSc  Surgeon : Shankar Gannon MD  ASSISTANT:   Indu Schultz MD -neurosurgery resident    PREOPERATIVE DIAGNOSIS:    MCA aneurysms, ICA aneurysm     POSTOPERATIVE DIAGNOSIS:    MCA aneurysms, ICA aneurysm     PROCEDURES PERFORMED:  1. fronto-temporal craniotomy for clipping of dysplastic MCA aneurysm,  and microsurgical clip ligation of anterior choroidal artery aneurysm.  2. Intraoperative use of microscope for microdissection.  3. Intraoperative electrophysiological monitoring with motor-evoked and somatosensory-evoked potentials.  4. Cranioplasty greater than 5 cm.  5. Duraplasty.    CPT   Surgery of complex intracranial aneurysm, intracranial approach; carotid circulation   68203 Microsurgical techniques, requiring use of operating microscope   87000 Angiography, carotid, cerebral, unilateral, radiological supervision and interpretation     ANESTHESIA: GETA.  ESTIMATED BLOOD LOSS:  300cc.  FINDINGS: dysplastic and complex right MCA bifurcation aneurysms, with multiple excrescences, with significant adhesion of the perforating and M2 branches at the neck, and across the dome.  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  Dysplastic anterior choroidal artery aneurysms with tight adhesion to the oculomotor nerve.  Multiple adhesions to surrounding neurologic structures requiring meticulous microdissection    (bifurcation/trifurcation)   MCA aneurysm with a broad base [and multiple adhesions to the brain].    DRAINS:  Hemovac drain.     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  Dura was adhesion of the surrounding left temporal lobe, left frontal lobe, as well as adhesion of the oculomotor nerve in the anterior choroidal artery aneurysm.   Significantly high-risk for intraoperative bleeding, or neurologic decline.  No qualified resident was available.    COMPLICATIONS: None.  DISPOSITION: Stable to the PACU.  INDICATIONS FOR THE PROCEDURE  Patient is a 73-year-old female, with significant past medical history of obstructive sleep apnea, on CPAP low-grade heart failure, obesity, history of gastric cancer with chemoradiation and DVT previously on Eliquis.  Long history of smoking.  Initially worked up for dizziness.  Was noted to have multiple intracranial aneurysms, strong family history, as well as notable enhancement on the vessel wall imaging.  She was noted to have a large and dysplastic right MCA bifurcation aneurysm with the angiographic architecture which was not amenable to endovascular treatment given the morphology.  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  Given the multiplicity of her aneurysms, and strong family history, and history of smoking, decision was made for open microsurgical treatment of the right MCA bifurcation aneurysms, M1 blister aneurysms, and the anterior choroidal artery aneurysms.  Be noted that the patient that if there was visualization of the left ophthalmic artery aneurysms we would consider save clip ligation, and would leave the basilar apex aneurysms for endovascular treatment.  We discussed with the patient and the family in great detail the risks, benefits, and alternatives to the procedure including but not limited to heart attack, coma, stroke, infection, bleeding, paralysis, even death.  Informed consent was obtained and secured in the chart after the patient and family voiced understanding of these risks and decided to proceed with the procedure.     DESCRIPTION OF THE PROCEDURE  The patient was transferred to the operating room.   She was given preoperative prophylactic IV antibiotics, hypertonic solution, as  well as prophylactic antiseizure medication. The patient was sedated and intubated without difficulty by the anesthesia service. Eyes were taped shut after ointment was applied to prevent corneal abrasion. A Renee Hugger was placed over the exposed lower body to maintain control of core body temperature. A Tatum catheter was inserted. The neurophysiological monitoring team placed their leads in the appropriate position.  In a garnered baseline somatosensory motor evoked potentials.  An EEG was monitored throughout the entirety of the case.    A Cordoba head clamp was applied. The patient was positioned supine with the head turned approximately  25° to the  right,with very mild extension. All pressure points were carefully padded. The hair was minimally clipped over the area in which  she was to undergo incision. Pre-prepping was done with  alcohol. The electrophysiology monitoring team inserted needles in their proper locations and baseline SSEPs and motor evoked potentials were obtained. Given the distal nature of the MCA bifurcation in close proximity to the sphenoid wing,    The patient was prepped and draped in standard sterile fashion. Local anesthesic was infiltrated along the line of planned and marked skin incision which was subsequently opened sharply with a # 10 scalpel blade to the level of the periosteum. Subcutaneous dissection was performed sharply with Metzenbaum scissors, preserving the superficial temporal artery and its branches.  subfascial dissection was completed along the superficial temporalis muscle fascia to preserve the frontalis branch of the facial nerve. The skin flap was reflected anteriorly. The temporalis muscle and periosteum were sharply dissected off the bone and elevated from inferior to superior with minimal bleeding. These structures were reflected anterolaterally and held with fish hooks. Using a high speed  electric drill, isabel holes were placed with a  bit in the  frontal-temporal regions. With the B1 bit and footplate, the isabel holes were interconnected and a craniotomy completed. The bone flap was freed from the dura with #3 Penfields and removed. The dura was visualized  intact). Hemostasis was achieved utilizing a combination of bipolar electrocautery and absorbable gelatin compressed sponge (Gelfoam). The wound was irrigated until clear. At this point, utilizing a 4 mm round andie drill bit, the lateral wing of the sphenoid was drilled lateral to medial. Hemostasis was again achieved.  The dura was opened  sharply and advanced with Metzenbaum scissors in a  C-shaped fashion. The dural flap was opened and secured out of the field with 4-0 non-absorbable braided polyamide suture (Nurolon).  The operating microscope was draped with a sterile drape and brought into the operative field. Microdissection was carried out from distal to proximal to open the Sylvian fissure, expose the proximal MCA and obtain proximal control of the aneurysm. Prior to which we proceeded sub frontally in order to gain access of the proximal ICA for proximal control.  We are able to clearly visualize the very proximal anterior choroidal artery aneurysms as well.  We further proceeded with dissection of the MCA aneurysm.  The dome of the aneurysm was noted to be tightly adherent to the surrounding brain tissue.      To the very high complexity, Meticulous microdissection was required to free the aneurysm from the surrounding brain structures  and multiple adhesions, in order to free arterial branches arising from the dome of the aneurysm.  Temporary clips were applied to gain proximal and distal control. Clamp time was monitored. Given the morphology with the blister aneurysm on the M1 segment, we removed the temporary clips out of abundance of caution there was noted to be some what appeared to be venous bleeding from the posterior aspect of the M1 segment.  This was addressed with tamponade and  suction.  And use of Gelfoam and thrombin.  A small vein that was coagulated.. Carefully, the MCA aneurysm at the MCA  bifurcation was dissected free and the neck was completely visualized. A permanent clip was applied to the neck of the aneurysm, and stacked and adjusted to a satisfactory position.  There was noted necessary for buttress clipping of the dog ear.  The small blister appearing aneurysms in the proximal M1 segment was visualized, and clip.   Microscopic angiography with indocyanine green was given IV and we visualize the proximal and distal branches of the aneurysms, no filling within the aneurysms.  There was appropriate filling of the daughter branches without any evidence of off target clipping.  This is confirmed utilizing with micro Doppler pre and post clipping. No filling was visualized to fill the aneurysm.  We then proceeded to make her way as well to the proximal ICA, and clearly visualize the anterior choroidal artery aneurysms.  Able to visualize the anterior choroidal artery branch, as well as there was tight adherence of the oculomotor nerve to the dome of the aneurysms.  We carefully and sharply dissected the aneurysm off of the anterior skull base where the dome was attached.  And very gently but sharply mobilized the medial portion of the oculomotor nerve off of the base of the aneurysms were single clip was placed.    Intraoperative doppler ultrasound was utilized to assess flow of the distal continuation of the MCA , and ICA.  Hemostasis was obtained utilizing a combination of bipolar electrocautery and absorbable gelatin compressed sponge (Gelfoam). The wound was irrigated until clear. The cranial cavity was irrigated full. The dura was reapproximated utilizing interrupted 4-0 non-absorbable braided polyamide suture (Nurolon) and synthetic duraplasty overlaid on the dural defect. The bone flap was secured utilizing miniplates and 4 mm screws from ([employer]). The temporalis muscle was  reapproximated utilizing 2-0 polyglactin synthetic absorbable suture (Vicryl). The wound was again irrigated with antibiotic solution until clear. A  Hemovac drain was left in place in the subgaleal space and bulb suction was applied. The galea was closed with inverted interrupted stitches of 2-0 polyglactin synthetic absorbable suture (Vicryl). The skin was then approximated utilizing  chromic. A sterile head dressing was placed over the closed wound.  All needle counts, sponge counts and instrument counts were correct at the end of the case times two. The patient tolerated the procedure well and was transferred to the recovery room in stable condition. Electrophysiological monitoring remained stable from baseline through the end of the procedure. Dr. Simon  was present during the critical portions of this case.

## 2024-11-22 NOTE — NURSING
Pt again noted to have periods of bradycardia, 12 lead done with AV block noted, Alicia NP called to bedside shown strips and reviewed all labs redrawn prior to going down to scan with atropine pulled to bedside. WCTCM.

## 2024-11-22 NOTE — PT/OT/SLP EVAL
Speech Language Pathology Evaluation  Cognitive Communication/Swallow Treatment    Patient Name:  Ca Coats   MRN:  [id number]  Admitting Diagnosis: Cerebral aneurysm    Recommendations:     Recommendations:                General Recommendations:  Dysphagia therapy, Speech/language therapy, and Cognitive-linguistic therapy  Diet recommendations:  NPO, NPO   Aspiration Precautions: Continue alternate means of nutrition, Frequent oral care, and Strict aspiration precautions   General Precautions: Standard, aphasia, aspiration, fall  Communication strategies:  provide increased time to answer and go to room if call light pushed. English vs. Scottish speaking    Assessment:     Ca Coats is a 73 y.o. female with an SLP diagnosis of Aphasia, Dysphagia, Dysarthria, and Cognitive-Linguistic Impairment.     History:     Past Medical History:   Diagnosis Date    YOUNG (acute kidney injury) 10/15/2021    Anemia     2018    Arthritis     BMI 60.0-69.9, adult     Cataract     Chronic diastolic congestive heart failure 01/27/2021    Coronary artery disease involving native coronary artery of native heart without angina pectoris 11/15/2024    Deep vein thrombosis     Depression     Dry eye syndrome     DVT of deep femoral vein, right 05/29/2023    Gastric adenocarcinoma 05/25/2021    GERD (gastroesophageal reflux disease)     Glaucoma suspect     History of gastric ulcer     History of psychiatric hospitalization     Hx of psychiatric care     Celexa, no recall of charted Effexor, Lexapro, Desyrel prescriptions    Hyperlipidemia     Hypertension     Hypothyroidism     Morbid obesity     Neuromuscular disorder     Sleep apnea     Thyroid disease        Past Surgical History:   Procedure Laterality Date    APPENDECTOMY      CATARACT EXTRACTION W/  INTRAOCULAR LENS IMPLANT Bilateral     MFIOL OU    CLIP LIGATION OF INTRACRANIAL ANEURYSM BY CRANIOTOMY Left 11/20/2024    Procedure: CRANIOTOMY, WITH ANEURYSM  CLIPPING W/ STEALTH;  Surgeon: Sejal Simon MD;  [facility];  Service: Neurosurgery;  Laterality: Left;  left pterioral craniotomy with microsurgical clip ligation of mca and anterior chroidal aneurysm, dr. ginna hopkins co surgeon, scalp block, type and cross 2 units, neuromonitoring sep,mep,eeg, regular bed, desouza, supine spencer, icu pos    CORONARY ANGIOGRAPHY Bilateral 02/24/2021    Procedure: ANGIOGRAM, CORONARY ARTERY;  Surgeon: Cresencio Ridley MD;  Location: [facility] CATH LAB;  Service: Cardiology;  Laterality: Bilateral;  Covid test needed - ok per Danay SSCU. Pt. w/o transportation or family    CRANIOTOMY, FOR ANEURYSM OF VERTEBROBASILAR OR CAROTID CIRCULATION Left 11/20/2024    Procedure: CRANIOTOMY, FOR ANEURYSM OF VERTEBROBASILAR OR CAROTID CIRCULATION;  Surgeon: Sejal Simon MD;  [facility];  Service: Neurosurgery;  Laterality: Left;  with scalp block    DISSECTION, NERVE, USING OPERATING MICROSCOPE Left 11/20/2024    Procedure: DISSECTION, NERVE, USING OPERATING MICROSCOPE;  Surgeon: Sejal Simon MD;  [facility];  Service: Neurosurgery;  Laterality: Left;  with scalp block    ENDOSCOPIC ULTRASOUND OF UPPER GASTROINTESTINAL TRACT N/A 03/17/2021    Procedure: ULTRASOUND, UPPER GI TRACT, ENDOSCOPIC;  Surgeon: Gerald Anaya MD;  Location: [facility] ([facility]  Service: Endoscopy;  Laterality: N/A;  Pt unable to get a ride for swab. Will do rapid test at 8:30 - ttr    ENDOSCOPIC ULTRASOUND OF UPPER GASTROINTESTINAL TRACT N/A 01/13/2022    Procedure: ULTRASOUND, UPPER GI TRACT, ENDOSCOPIC;  Surgeon: Kevin Carballo MD;  Location: [facility]O [facility]  Service: Endoscopy;  Laterality: N/A;  blood thinner approval received, see telephone encounter 1/7/22-BB  rapid  instructions given verbally and sent to address on file in pt's chart-BB    ENDOSCOPIC ULTRASOUND OF UPPER GASTROINTESTINAL TRACT N/A 10/11/2022    Procedure: ULTRASOUND,  UPPER GI TRACT, ENDOSCOPIC;  Surgeon: Dequan Ridley MD;  Location: [facility] ENDO (2ND FLR);  Service: Endoscopy;  Laterality: N/A;    ENDOSCOPIC ULTRASOUND OF UPPER GASTROINTESTINAL TRACT N/A 01/24/2024    Procedure: ULTRASOUND, UPPER GI TRACT, ENDOSCOPIC;  Surgeon: Kevin Carballo MD;  Location: [facility] ENDO (2ND FLR);  Service: Endoscopy;  Laterality: N/A;    ENDOSCOPIC ULTRASOUND OF UPPER GASTROINTESTINAL TRACT N/A 03/05/2024    Procedure: ULTRASOUND, UPPER GI TRACT, ENDOSCOPIC;  Surgeon: Kevin Carballo MD;  Location: [facility] ENDO (2ND FLR);  Service: Endoscopy;  Laterality: N/A;  1/25 portal instr-Repeat theEUS at the next available appointment because the preparation was poor. 48hrs of liquid. Ozempic 7 day hold-eliquis approved Dr. Pelaez TE 12/12/2023-tt  2/28-precall complete-pt verbalized udnerstanding of holding Oz    ESOPHAGOGASTRODUODENOSCOPY N/A 02/05/2021    Procedure: EGD (ESOPHAGOGASTRODUODENOSCOPY);  Surgeon: Matthew Hernadez MD;  Location: [facility] ENDO (2ND FLR);  Service: Endoscopy;  Laterality: N/A;  BMI-58    Wt: 361#     COVID test at [facility] on 2/2-GT    ESOPHAGOGASTRODUODENOSCOPY N/A 03/17/2021    Procedure: EGD (ESOPHAGOGASTRODUODENOSCOPY);  Surgeon: Gerald Anaya MD;  Location: [facility] ENDO (2ND FLR);  Service: Endoscopy;  Laterality: N/A;    ESOPHAGOGASTRODUODENOSCOPY N/A 05/25/2021    Procedure: EGD (ESOPHAGOGASTRODUODENOSCOPY);  Surgeon: Kevin Carballo MD;  Location: [facility] ENDO (2ND FLR);  Service: Endoscopy;  Laterality: N/A;  ESD 2 hours  Full COVID vaccination on file. ttr    ESOPHAGOGASTRODUODENOSCOPY N/A 01/13/2022    Procedure: EGD (ESOPHAGOGASTRODUODENOSCOPY);  Surgeon: Kevin Carballo MD;  Location: [facility] ENDO (2ND FLR);  Service: Endoscopy;  Laterality: N/A;  blood thinner approval, see telephone encounter 1/7/22-BB  rapid  instructions given verbally and sent to address on file in pt's chart-BB    ESOPHAGOGASTRODUODENOSCOPY N/A 10/11/2022    Procedure: EGD  (ESOPHAGOGASTRODUODENOSCOPY);  Surgeon: Dequan Ridley MD;  Location: [facility] ENDO (2ND FLR);  Service: Endoscopy;  Laterality: N/A;  She is do for EGD/EUS now. Personal history of gastric cancer. Ca Coats #[id number].   Thanks,   Kevin Fuentes MD    Pt is fully vaccinated-DS  9/14/22-Approval to hold Eliquis rec'd from Dr. Pelaez (see telephone encounter 9/14/22)-DS  9/14/22-Ins    ESOPHAGOGASTRODUODENOSCOPY N/A 01/24/2024    Procedure: EGD (ESOPHAGOGASTRODUODENOSCOPY);  Surgeon: Kevin Carballo MD;  Location: [facility] ENDO (2ND FLR);  Service: Endoscopy;  Laterality: N/A;  12/8/23: instructions sent via portal. eliquis approval from MD Pelaez in telephone encounter (12/12/23) -GD  1/9-precall complete-MS    ESOPHAGOGASTRODUODENOSCOPY N/A 03/05/2024    Procedure: EGD (ESOPHAGOGASTRODUODENOSCOPY);  Surgeon: Kevin Carballo MD;  Location: [facility] ENDO (2ND FLR);  Service: Endoscopy;  Laterality: N/A;    EYE SURGERY      HYSTEROSCOPIC POLYPECTOMY OF UTERUS  01/10/2024    Procedure: POLYPECTOMY, UTERUS, HYSTEROSCOPIC;  Surgeon: Pina Pickens MD;  Location: [facility] OR;  Service: OB/GYN;;    HYSTEROSCOPY WITH DILATION AND CURETTAGE OF UTERUS N/A 01/10/2024    Procedure: HYSTEROSCOPY, WITH DILATION AND CURETTAGE OF UTERUS;  Surgeon: Pina Pickens MD;  Location: [facility] OR;  Service: OB/GYN;  Laterality: N/A;    INJECTION OF ANESTHETIC AGENT AROUND NERVE Left 07/10/2018    Procedure: BLOCK, NERVE;  Surgeon: Samantha Vidal MD;  Location: [facility] PAIN MGT;  Service: Pain Management;  Laterality: Left;  Genicular     INJECTION OF ANESTHETIC AGENT AROUND NERVE Left 07/19/2019    Procedure: Block, Nerve NERVE BLOCK GENICULAR WITH PHENOL 6%;  Surgeon: Samantha Vidal MD;  Location: [facility] PAIN MGT;  Service: Pain Management;  Laterality: Left;  NEEDS CONSENT    INSERTION OF TUNNELED CENTRAL VENOUS CATHETER (CVC) WITH SUBCUTANEOUS PORT N/A 07/09/2021    Procedure: INSERTION, PORT-A-CATH;  Surgeon: Mario Paz MD;   "Location: [facility] CATH LAB;  Service: Radiology;  Laterality: N/A;  PORT PLACEMENT    RADIOFREQUENCY ABLATION Left 06/19/2020    Procedure: RADIOFREQUENCY ABLATION LEFT GENICULAR;  Surgeon: Tevin Clark MD;  Location: [facility] PAIN MGT;  Service: Pain Management;  Laterality: Left;  Left Genicular RFA    RADIOFREQUENCY ABLATION Left 01/22/2021    Procedure: RADIOFREQUENCY ABLATION LEFT GENICULAR;  Surgeon: Tevin Clark MD;  Location: [facility] PAIN MGT;  Service: Pain Management;  Laterality: Left;    RADIOFREQUENCY ABLATION Left 07/30/2021    Procedure: RADIOFREQUENCY ABLATION, GENICULAR COOLED NEED CONSENT;  Surgeon: Tevin Clark MD;  Location: [facility] PAIN MGT;  Service: Pain Management;  Laterality: Left;    RADIOFREQUENCY ABLATION Left 04/22/2022    Procedure: RADIOFREQUENCY ABLATION, GENICULAR COOLED , LEFT;  Surgeon: Tevin Clark MD;  Location: [facility] PAIN MGT;  Service: Pain Management;  Laterality: Left;    RADIOFREQUENCY ABLATION Left 12/23/2022    Procedure: RADIOFREQUENCY ABLATION LEFT GENICULAR COOLED CLEARED TO HOLD ELIQUIS 3 DAYS  NEEDS CONSENT;  Surgeon: Tevin Clark MD;  Location: [facility] PAIN MGT;  Service: Pain Management;  Laterality: Left;    TONSILLECTOMY     HPI: "Isabel Coats is a 73-year-old female past medical history of hypertension, obstructive sleep apnea on CPAP, suggestive heart failure, obesity, history of gastric cancer status post chemoradiation and DVT in currently on Eliquis. She presents to NCC L pterional crani, 7 clips applied in proximity to large irregular L MCA bifurcation aneurysm and other adjacent small aneurysms, 1 clip applied across neck of aneurysm just proximal to anterior choroidal.  She also has a current smoker she was initially referred to Neurology for workup for dizziness. She had been complaining of vertigo multiple times a day for the past several months, associated with shortness of breath, palpitation, nausea, headache, and feeling faint. She is admitted " "to [facility] for a higher level of care "    Social History: Patient lives with granddaughter.    Prior Intubation HX:  11/20    Modified Barium Swallow: none on file.    Chest X-Rays: 11/22: "FINDINGS:  NG tube below the EG junction.  Right-sided central venous catheter in the SVC.  Heart size normal.  No significant airspace consolidation or pleural effusion identified"    CT Head: 11/21: "Impression: CT perfusion demonstrates impaired perfusion in the left anterior temporal lobe near the region of aneurysm clipping.  No large ischemic penumbra. Overall evaluation for vasospasm is limited due to the limited CTA technique.   No residual aneurysm in the left MCA or the left PCOM distribution.  Unchanged basilar tip aneurysm.   Stable postoperative changes of left griffin-craniotomy for aneurysm clipping.  Scattered areas of hemorrhage are unchanged.  No new edema, major vascular distribution infarct, or hydrocephalus."    Prior diet: gaetano.    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      Subjective    RN agreeable to SLP session and present at bedside throughout session. Upon SLP return pt drowsy requiring persistent verbal and tactile cues to maintain wakefulness t/o session. NGT and bilateral restraints in place. No family present at the bedside.  "Let me go help."     Pain/Comfort:  Pain Rating 1: 0/10  Pain Addressed 1: Nurse notified    Respiratory Status: Room air    Objective:     Cognitive Status:    Pt requiring verbal and tactile cues t/o session to rouse and maintain      alertness  Attention Sustained attention deficit   Orientation via yes/no questions: Pt perseverating on yes demonstrating decreased alertness      Receptive Language:   Comprehension:      Questions Simple yes/no: Pt perseverating on yes; Pt intermittently      able to respond appropriately in context t/o session  Pt able to follow simple one step commands in function    Expressive Language:  Verbal:    Automatic Speech  Counting Pt able to count to " 10 given mod multimodal cues for initiation and clinician prompting modeling  Repetition Words 2/2  Naming Confrontation 0/2 given max cues  Pt with jargon noted t/o session and intermittent unintelligible strings of     speech output. Pt not consistently able to communicate basic wants/needs this date.      Motor Speech:  Dysarthria      Voice:   WFL    Visual-Spatial:  Pt able to attend to right side given min-mod verbal and tactile cues    Reading:   tba      Written Expression:   tba    Treatment: Pt seen at the bedside for a speech/language evaluation this date. Pt requiring verbal and tactile cues to rouse and required intermittently t/o session to attend to tasks. Pt with increasing restlessness in bed t/o session. Pt demonstrating receptive and expressive language deficits. Pt demonstrating increased confusion this date.   Pt accepted ice chip trial requiring cues to strip bolus from utensil and initiate A-P transit. Pt with delayed swallow initiation, requiring verbal/tactile cues to maintain alertness. Thin liquids via tsp x2 with delayed swallow initiation and overt coughing following swallow of first trial, and no swallow elicited on second trial resulting in removal of bolus via oral suction. PO trials discontinued given decreased attention to PO and restlessness in bed requesting to lay back. SLP educated pt re: services, language deficits, aspiration risk and precautions, goals, and poc to which she will benefit from continued reinforcements. Recommend NPO with continued alternate means of nutrition/hydration/meds.    Goals:   Multidisciplinary Problems       SLP Goals          Problem: SLP    Goal Priority Disciplines Outcome   SLP Goal     SLP Progressing   Description: Speech Language Pathology Goals  Goals expected to be met by 12/5:  1) Pt will participate in ongoing assessment of swallow function to determine least restrictive diet.  2) Pt will participate in ongoing speech, language, cognitive  evaluation to determine possible goals and poc.   3) Pt will respond to simple yes/no questions with 80% accy given min cues.  4) Pt will complete automatic speech tasks with 90% accy given min cues.  5) Pt will complete naming tasks with 80% accy given mod cues.  6) Pt will follow simple commands with 90% accy given mod cues.                                 Plan:   Patient to be seen:  5 x/week   Plan of Care expires:  12/22/24  Plan of Care reviewed with:  patient   SLP Follow-Up:  Yes       Discharge recommendations:  Therapy Intensity Recommendations at Discharge: High Intensity Therapy   Barriers to Discharge:  Level of Skilled Assistance Needed      Time Tracking:     SLP Treatment Date:   11/22/24  Speech Start Time:  0920  Speech Stop Time:  0943     Speech Total Time (min):  23 min    Billable Minutes: Eval 11  and Treatment Swallowing Dysfunction 12    11/22/2024

## 2024-11-22 NOTE — PLAN OF CARE
[facility] Care Plan    POC reviewed with Ca Coats and family at 0300. Family verbalized understanding over phone. Questions and concerns addressed. No acute events today. Pt progressing toward goals. Will continue to monitor. See below and flowsheets for full assessment and VS info.       Best neuro exam: opens eyes spontaneously, both pupils brisk and equal with left gaze preference, able to cross midline/track appropriately, able to repeat name and grossly follow central/simple commands, moves all extremities spontaneously with equal strength. Repetitive words/phrases or incomprehensible speech. Intermittent yes/no inappropriate     Fluids, cardene, and milrinone in place  Gave 500ml NS bolus  Oxy 5 given x2   Frequent repositioning  Hemovac rain 50ml serosanguinous output  Purewick in place, 650ml output  Replaced/repalcing phos and K        Is this a stroke patient? yes- Stroke booklet reviewed with patient and family, risk factors identified for patient and stroke booklet remains at bedside for ongoing education.     Care individualization:   Problem: Stroke, Intracerebral Hemorrhage  Goal: Optimal Coping  Intervention: Support Psychosocial Response to Stroke  Flowsheets (Taken 11/22/2024 0522)  Supportive Measures:   active listening utilized   positive reinforcement provided   problem-solving facilitated   relaxation techniques promoted   goal-setting facilitated   verbalization of feelings encouraged  Family/Support System Care:   presence promoted   involvement promoted   support provided   self-care encouraged       Neuro:  [place] Coma Scale  Best Eye Response: 3-->(E3) to speech  Best Motor Response: 5-->(M5) localizes pain  Best Verbal Response: 2-->(V2) incomprehensible speech  Iker Coma Scale Score: 10  Assessment Qualifiers: patient not sedated/intubated  Pupil PERRLA: no     24 hr Temp:  [97.5 °F (36.4 °C)-98.4 °F (36.9 °C)]     CV:   Rhythm: normal sinus rhythm  BP goals:   SBP <   "120  MAP > 65-85    Resp: 2L NC    Plan: N/A    GI/:     Diet/Nutrition Received: NPO  Last Bowel Movement: 24  Voiding Characteristics: external catheter, voids spontaneously without difficulty    Intake/Output Summary (Last 24 hours) at 2024 05  Last data filed at 2024 0501  Gross per 24 hour   Intake 3322.67 ml   Output 1520 ml   Net 1802.67 ml     Unmeasured Output  Stool Occurrence: 0  Pad Count: 1    Labs/Accuchecks:  Recent Labs   Lab 24   WBC 27.74*   RBC 2.67*   HGB 8.8*   HCT 26.2*         Recent Labs   Lab 24   *   K 3.9   CO2 19*   *   BUN 12   CREATININE 0.8   ALKPHOS 76   ALT 13   AST 38   BILITOT 0.5      Recent Labs   Lab 24  0904   INR 1.0   APTT 25.4    No results for input(s): "CPK", "CPKMB", "TROPONINI", "MB" in the last 168 hours.    Electrolytes: Electrolytes replaced  Accuchecks: none    Gtts:   0.9% NaCl   Intravenous Continuous 50 mL/hr at 24 0501 Rate Verify at 24 0501    milrinone 20mg/100ml D5W (200mcg/ml)  0.5 mcg/kg/min Intravenous Continuous 18.5 mL/hr at 24 0501 0.5 mcg/kg/min at 24 0501    nicardipine  0-15 mg/hr Intravenous Continuous 25 mL/hr at 24 0501 5 mg/hr at 24 0501       LDA/Wounds:    Domenico Risk Assessment  Sensory Perception: 2-->very limited  Moisture: 3-->occasionally moist  Activity: 1-->bedfast  Mobility: 3-->slightly limited  Nutrition: 2-->probably inadequate  Friction and Shear: 2-->potential problem  Domenico Score: 13  Is your domenico score 12 or less? no          Restraints:   Restraint Order  Length of Order: Order good for next 24 hours or when removed.  Date that the current order will : 24  Time that the current order will : 050  Order Upon Application: Yes    WC   "

## 2024-11-22 NOTE — PT/OT/SLP PROGRESS
Occupational Therapy      Patient Name:  Ca Coats   MRN:  [id number]    Patient not seen today secondary to pt going  Off the floor for procedure at time of attempt (1306). Will follow-up as appropriate.    11/22/2024

## 2024-11-22 NOTE — CONSULTS
Epilepsy Team    CC: EEG placed for concern for epileptiform activity/seizures    HPI: 73 year old woman with hx of admitted for management of HTN, LAURA, CHF, obesity, history of gastric cancer post chemoradiation and DVT currently on eliquis admitte dot [facility] s/p L pterional crani, 7 clips applied in proximity to large irregular L MCA bifurcation aneurysm and other adjacent small aneurysms, 1 clip applied across neck of aneurysm just proximal to anterior choroidal. EEG initiated 11/22 for further evaluation after event of L facial twitching 11/21.    Unable to obtain patient's family and social history due to patient altered mental status    Patient is currently maintained on Levetiracetam 1000 mg BID      Review of systems:  Not performed secondary to patient altered mental status     Physical Exam  General: Afebrile, alert, comfortable.  HEENT: Normocephalic. EEG in place.   Pulmonary: Effort normal, no respiratory distress.  Skin: Warm and dry.  Psych: Unable to assess.   Neuro:  CN:   Alert, oriented to self  Spontaneous eye opening  VOR intact OU   Corneal intact OU   + spontaneous eye opening   Purposeful movement of all extremities  Not following commands     Exam findings to suggest seizures:  Myoclonus - no   eye twitching - no   Nystagmus - no   gaze deviation - no   waxy rigidity - no      EEG reviewed by Epileptologist, findings include generalized slowing, intermittent focal slowing on left. No epileptiform activity, no electrographic seizures; see EEG report for details.     Recommendations: Encephalopathy is nonspecific in regards to etiology. No indication for escalation of anti-seizure drug at this time. Recommend discontinuation of EEG. Findings and recommendations discussed with primary team.      Cerebral aneurysm  - Management per NCC/NSGY  - S/p L pterional crani, 7 clips applied in proximity to large irregular L MCA bifurcation aneurysm and other adjacent small aneurysms, 1 clip applied across  neck of aneurysm just proximal to anterior choroidal   - Strict SBP <120, MAP >65  - EEG 11/21>11/22 with no epileptiform activity or seizures, discontinue 11/22  - Currently on Levetiracetam 1000 mg BID  - Currently on milrinone    Essential hypertension  - Vitals reviewed, management per NCC  - Strict SBP <120, MAP >65 per NSGY  - Currently on Nicardipine    Pure hypercholesterolemia  - Management per [facility]    Chronic diastolic congestive heart failure   - Last echo 10/22/24 with EF 60-65%  - Management per NCC    We will remove EEG today and sign off. Please do not hesitate to call us back with any questions or concerns.      Chelsy Catalan PA-C  Neurology Epilepsy  Staff: Dr. Tran

## 2024-11-22 NOTE — CARE UPDATE
"Pt redirectable when agitated. Spontaneous movements in all extremities and waking spontaneously. Pupils remain sluggish and unequal. Pt with gross movement to both feet when asked to "wiggle your toes" and picks up left hand when asked to "show a thumbs up." Pt also behaved appropriately (accepted probe under tongue) when collecting an oral temperature with thermometer. Speech remains incomprehensible.   "

## 2024-11-22 NOTE — NURSING
To CT again for perfusion scan vi abed with portable monitor ambu bag and 02 on cardene still titrating to MAPS 65-85 tolerated fairly well although very restless during scan.

## 2024-11-23 LAB
ALBUMIN SERPL BCP-MCNC: 2.8 G/DL (ref 3.5–5.2)
ALP SERPL-CCNC: 73 U/L (ref 40–150)
ALT SERPL W/O P-5'-P-CCNC: 12 U/L (ref 10–44)
ANION GAP SERPL CALC-SCNC: 10 MMOL/L (ref 8–16)
AST SERPL-CCNC: 43 U/L (ref 10–40)
BASOPHILS # BLD AUTO: 0.02 K/UL (ref 0–0.2)
BASOPHILS NFR BLD: 0.1 % (ref 0–1.9)
BILIRUB SERPL-MCNC: 0.5 MG/DL (ref 0.1–1)
BUN SERPL-MCNC: 14 MG/DL (ref 8–23)
CALCIUM SERPL-MCNC: 8.5 MG/DL (ref 8.7–10.5)
CHLORIDE SERPL-SCNC: 116 MMOL/L (ref 95–110)
CO2 SERPL-SCNC: 21 MMOL/L (ref 23–29)
CREAT SERPL-MCNC: 0.7 MG/DL (ref 0.5–1.4)
DIFFERENTIAL METHOD BLD: ABNORMAL
EOSINOPHIL # BLD AUTO: 0 K/UL (ref 0–0.5)
EOSINOPHIL NFR BLD: 0 % (ref 0–8)
ERYTHROCYTE [DISTWIDTH] IN BLOOD BY AUTOMATED COUNT: 13.6 % (ref 11.5–14.5)
EST. GFR  (NO RACE VARIABLE): >60 ML/MIN/1.73 M^2
GLUCOSE SERPL-MCNC: 111 MG/DL (ref 70–110)
HCT VFR BLD AUTO: 25 % (ref 37–48.5)
HGB BLD-MCNC: 8.2 G/DL (ref 12–16)
IMM GRANULOCYTES # BLD AUTO: 0.17 K/UL (ref 0–0.04)
IMM GRANULOCYTES NFR BLD AUTO: 0.8 % (ref 0–0.5)
LYMPHOCYTES # BLD AUTO: 1.9 K/UL (ref 1–4.8)
LYMPHOCYTES NFR BLD: 8.9 % (ref 18–48)
MAGNESIUM SERPL-MCNC: 2.1 MG/DL (ref 1.6–2.6)
MCH RBC QN AUTO: 32.8 PG (ref 27–31)
MCHC RBC AUTO-ENTMCNC: 32.8 G/DL (ref 32–36)
MCV RBC AUTO: 100 FL (ref 82–98)
MONOCYTES # BLD AUTO: 2 K/UL (ref 0.3–1)
MONOCYTES NFR BLD: 9.5 % (ref 4–15)
NEUTROPHILS # BLD AUTO: 17.1 K/UL (ref 1.8–7.7)
NEUTROPHILS NFR BLD: 80.7 % (ref 38–73)
NRBC BLD-RTO: 0 /100 WBC
PHOSPHATE SERPL-MCNC: 2.4 MG/DL (ref 2.7–4.5)
PLATELET # BLD AUTO: 186 K/UL (ref 150–450)
PMV BLD AUTO: 10.7 FL (ref 9.2–12.9)
POTASSIUM SERPL-SCNC: 3.5 MMOL/L (ref 3.5–5.1)
PROT SERPL-MCNC: 5.6 G/DL (ref 6–8.4)
RBC # BLD AUTO: 2.5 M/UL (ref 4–5.4)
SODIUM SERPL-SCNC: 147 MMOL/L (ref 136–145)
WBC # BLD AUTO: 21.18 K/UL (ref 3.9–12.7)

## 2024-11-23 PROCEDURE — 20000000 HC ICU ROOM: Mod: HCNC

## 2024-11-23 PROCEDURE — 80053 COMPREHEN METABOLIC PANEL: CPT | Mod: HCNC

## 2024-11-23 PROCEDURE — 99291 CRITICAL CARE FIRST HOUR: CPT | Mod: HCNC,,, | Performed by: PSYCHIATRY & NEUROLOGY

## 2024-11-23 PROCEDURE — 84100 ASSAY OF PHOSPHORUS: CPT | Mod: HCNC | Performed by: NURSE PRACTITIONER

## 2024-11-23 PROCEDURE — 63600175 PHARM REV CODE 636 W HCPCS: Mod: HCNC

## 2024-11-23 PROCEDURE — 25000003 PHARM REV CODE 250: Mod: HCNC | Performed by: NURSE PRACTITIONER

## 2024-11-23 PROCEDURE — 25000003 PHARM REV CODE 250: Mod: HCNC

## 2024-11-23 PROCEDURE — 27000221 HC OXYGEN, UP TO 24 HOURS: Mod: HCNC

## 2024-11-23 PROCEDURE — 63600175 PHARM REV CODE 636 W HCPCS: Mod: HCNC | Performed by: NURSE PRACTITIONER

## 2024-11-23 PROCEDURE — 83735 ASSAY OF MAGNESIUM: CPT | Mod: HCNC | Performed by: NURSE PRACTITIONER

## 2024-11-23 PROCEDURE — 94761 N-INVAS EAR/PLS OXIMETRY MLT: CPT | Mod: HCNC

## 2024-11-23 PROCEDURE — 63600175 PHARM REV CODE 636 W HCPCS: Performed by: NURSE PRACTITIONER

## 2024-11-23 PROCEDURE — 85025 COMPLETE CBC W/AUTO DIFF WBC: CPT | Mod: HCNC | Performed by: NURSE PRACTITIONER

## 2024-11-23 RX ORDER — ATORVASTATIN CALCIUM 20 MG/1
20 TABLET, FILM COATED ORAL DAILY
Status: DISCONTINUED | OUTPATIENT
Start: 2024-11-24 | End: 2024-11-29

## 2024-11-23 RX ORDER — NICARDIPINE HYDROCHLORIDE 0.2 MG/ML
0-15 INJECTION INTRAVENOUS CONTINUOUS
Status: DISCONTINUED | OUTPATIENT
Start: 2024-11-23 | End: 2024-11-24

## 2024-11-23 RX ORDER — NICARDIPINE HYDROCHLORIDE 0.2 MG/ML
0-15 INJECTION INTRAVENOUS CONTINUOUS
Status: DISCONTINUED | OUTPATIENT
Start: 2024-11-23 | End: 2024-11-23

## 2024-11-23 RX ADMIN — MILRINONE LACTATE IN DEXTROSE 0.5 MCG/KG/MIN: 200 INJECTION, SOLUTION INTRAVENOUS at 03:11

## 2024-11-23 RX ADMIN — POLYETHYLENE GLYCOL 3350 17 G: 17 POWDER, FOR SOLUTION ORAL at 08:11

## 2024-11-23 RX ADMIN — SENNOSIDES AND DOCUSATE SODIUM 1 TABLET: 50; 8.6 TABLET ORAL at 08:11

## 2024-11-23 RX ADMIN — OXYCODONE 5 MG: 5 TABLET ORAL at 09:11

## 2024-11-23 RX ADMIN — MORPHINE SULFATE 2 MG: 2 INJECTION, SOLUTION INTRAMUSCULAR; INTRAVENOUS at 09:11

## 2024-11-23 RX ADMIN — LEVETIRACETAM 1000 MG: 100 INJECTION INTRAVENOUS at 08:11

## 2024-11-23 RX ADMIN — CEFAZOLIN 2 G: 2 INJECTION, POWDER, FOR SOLUTION INTRAMUSCULAR; INTRAVENOUS at 09:11

## 2024-11-23 RX ADMIN — ACETAMINOPHEN 650 MG: 325 TABLET ORAL at 07:11

## 2024-11-23 RX ADMIN — OXYCODONE 5 MG: 5 TABLET ORAL at 07:11

## 2024-11-23 RX ADMIN — MUPIROCIN: 20 OINTMENT TOPICAL at 09:11

## 2024-11-23 RX ADMIN — NICARDIPINE HYDROCHLORIDE 5 MG/HR: 0.2 INJECTION, SOLUTION INTRAVENOUS at 08:11

## 2024-11-23 RX ADMIN — HEPARIN SODIUM 5000 UNITS: 5000 INJECTION INTRAVENOUS; SUBCUTANEOUS at 02:11

## 2024-11-23 RX ADMIN — CEFAZOLIN 2 G: 2 INJECTION, POWDER, FOR SOLUTION INTRAMUSCULAR; INTRAVENOUS at 12:11

## 2024-11-23 RX ADMIN — POTASSIUM & SODIUM PHOSPHATES POWDER PACK 280-160-250 MG 2 PACKET: 280-160-250 PACK at 05:11

## 2024-11-23 RX ADMIN — LEVETIRACETAM 1000 MG: 100 INJECTION INTRAVENOUS at 09:11

## 2024-11-23 RX ADMIN — MILRINONE LACTATE IN DEXTROSE 0.5 MCG/KG/MIN: 200 INJECTION, SOLUTION INTRAVENOUS at 09:11

## 2024-11-23 RX ADMIN — POTASSIUM BICARBONATE 50 MEQ: 978 TABLET, EFFERVESCENT ORAL at 05:11

## 2024-11-23 RX ADMIN — ACETAMINOPHEN 650 MG: 325 TABLET ORAL at 01:11

## 2024-11-23 RX ADMIN — MUPIROCIN: 20 OINTMENT TOPICAL at 08:11

## 2024-11-23 RX ADMIN — CEFAZOLIN 2 G: 2 INJECTION, POWDER, FOR SOLUTION INTRAMUSCULAR; INTRAVENOUS at 05:11

## 2024-11-23 RX ADMIN — SODIUM CHLORIDE: 9 INJECTION, SOLUTION INTRAVENOUS at 05:11

## 2024-11-23 RX ADMIN — OXYCODONE 5 MG: 5 TABLET ORAL at 01:11

## 2024-11-23 RX ADMIN — HEPARIN SODIUM 5000 UNITS: 5000 INJECTION INTRAVENOUS; SUBCUTANEOUS at 09:11

## 2024-11-23 RX ADMIN — HEPARIN SODIUM 5000 UNITS: 5000 INJECTION INTRAVENOUS; SUBCUTANEOUS at 05:11

## 2024-11-23 RX ADMIN — ACETAMINOPHEN 650 MG: 325 TABLET ORAL at 12:11

## 2024-11-23 RX ADMIN — NICARDIPINE HYDROCHLORIDE 15 MG/HR: 0.2 INJECTION, SOLUTION INTRAVENOUS at 01:11

## 2024-11-23 RX ADMIN — HYDRALAZINE HYDROCHLORIDE 10 MG: 20 INJECTION, SOLUTION INTRAMUSCULAR; INTRAVENOUS at 01:11

## 2024-11-23 RX ADMIN — NICARDIPINE HYDROCHLORIDE 5 MG/HR: 0.2 INJECTION INTRAVENOUS at 05:11

## 2024-11-23 RX ADMIN — NICARDIPINE HYDROCHLORIDE 7.5 MG/HR: 0.2 INJECTION, SOLUTION INTRAVENOUS at 12:11

## 2024-11-23 NOTE — ASSESSMENT & PLAN NOTE
Ms. 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 CTA was negative for new occlusion, EEG was negative for epileptiform activity, and MRI revealed edema in the L anterior temporal lobe and posterior inferior frontal lobes but without changes that would explain patient's clinical exam. Has been maintained on milrinone/nicardipine to maintain SBP<120, MAP>65. After discussion with NSGY staff, we will liberalize MAP goal and follow neuro exam to monitor for improvement with higher systolic pressures.    - Admit to NCC   - s/p L pterional crani, 7 clips applied in proximity to large irregular L MCA bifurcation aneurysm and other adjacent small aneurysms, 1 clip applied across neck of aneurysm just proximal to anterior choroidal.   - Na goal 140-150  - MAP goal   - Q 1h vitals  - Q 1h neuro checks  - Keppra 1g BID  - s/p dex 4 q 6 x 3 doses  - post-op ancef x5d

## 2024-11-23 NOTE — PT/OT/SLP PROGRESS
Occupational Therapy      Patient Name:  Ca Coats   MRN:  [id number]    Patient not seen today secondary to attempted Pt this am, but with wound care and being cleaned up by staff. Unable to return for a second attempt. Will follow-up on 11/24.    11/23/2024

## 2024-11-23 NOTE — PT/OT/SLP PROGRESS
Physical Therapy Missed Treatment Note      Patient Name:  Ca Coats   MRN:  [id number]  Admitting Diagnosis:  Cerebral aneurysm   Recent Surgery: Procedure(s) (LRB):  CRANIOTOMY, WITH ANEURYSM CLIPPING W/ STEALTH (Left)  CRANIOTOMY, FOR ANEURYSM OF VERTEBROBASILAR OR CAROTID CIRCULATION (Left)  DISSECTION, NERVE, USING OPERATING MICROSCOPE (Left) 3 Days Post-Op  Admit Date: 11/20/2024  Length of Stay: 3 days    Patient not seen today due to Nursing care. Upon attempt at 1126, wound care at bedside and pt also being cleaned up by nsg. PT unable to return for additional attempts this date.Will follow-up for progressive mobility pending continued medical stability and patient participation.    11/23/2024

## 2024-11-23 NOTE — SUBJECTIVE & OBJECTIVE
Interval History:  see hospital course    Review of Systems   Unable to perform ROS: Mental status change     Objective:     Vitals:  Temp: 98.1 °F (36.7 °C)  Pulse: 85  Rhythm: normal sinus rhythm  BP: (!) 127/56  MAP (mmHg): 85  Resp: (!) 25  SpO2: 96 %    Temp  Min: 98.1 °F (36.7 °C)  Max: 98.8 °F (37.1 °C)  Pulse  Min: 85  Max: 131  BP  Min: 98/49  Max: 154/67  MAP (mmHg)  Min: 70  Max: 98  Resp  Min: 23  Max: 40  SpO2  Min: 91 %  Max: 98 %    11/22 0701 - 11/23 0700  In: 2670.6 [I.V.:2140.6]  Out: 1392 [Urine:1300; Drains:92]   Unmeasured Output  Urine Occurrence: 1  Stool Occurrence: 2  Pad Count: 1          Physical Exam:  GA: drowsy, comfortable, no acute distress.   HEENT: No scleral icterus or JVD. Neck supple  Pulmonary: Air entry equal to auscultation A/P/L. Wheezing no, crackles no  Cardiac: RRR, S1 & S2 w/o rubs/murmurs/gallops.   Abdominal: soft, non-tender, bowel sounds present x 4. No appreciable hepatosplenomegaly.  Skin: No jaundice, rashes, or visible lesions.  Neuro:  --GCS: 10  --Mental Status:  lethargic, follows  simple commands. Some appropriate words, but largely incomprehensible  --Pupils 3.5 mm  --Left gaze preference  --Right HH  --Corneal reflex not done in this arousable patient, gag, cough intact.  Moves bilateral UE and LE symmetrically  MSK:  No edema in UE and LE    Medications:  Continuous0.9% NaCl, Last Rate: 50 mL/hr at 11/23/24 1302  nicardipine, Last Rate: 15 mg/hr (11/23/24 1319)    ScheduledceFAZolin (Ancef) IV (PEDS and ADULTS), 2 g, Q8H  heparin (porcine), 5,000 Units, Q8H  levETIRAcetam (Keppra) IV (PEDS and ADULTS), 1,000 mg, Q12H  mupirocin, , BID  polyethylene glycol, 17 g, Daily  senna-docusate 8.6-50 mg, 1 tablet, Daily    PRNacetaminophen, 650 mg, Q4H PRN  bisacodyL, 10 mg, Daily PRN  hydrALAZINE, 10 mg, Q6H PRN  magnesium oxide, 800 mg, PRN  magnesium oxide, 800 mg, PRN  morphine, 2 mg, Q6H PRN  naloxone, 0.4 mg, PRN  ondansetron, 4 mg, Q12H PRN  oxyCODONE, 5 mg,  Q6H PRN  potassium bicarbonate, 35 mEq, PRN  potassium bicarbonate, 50 mEq, PRN  potassium bicarbonate, 60 mEq, PRN  potassium, sodium phosphates, 2 packet, PRN  potassium, sodium phosphates, 2 packet, PRN  potassium, sodium phosphates, 2 packet, PRN  sodium chloride 0.9%, 10 mL, PRN      Today I personally reviewed pertinent medications, lines/drains/airways, imaging, cardiology results, laboratory results, microbiology results, notably:    Diet  Diet NPO  Diet NPO    MRI Brain without 11/22/24  -Evolving postsurgical change from recent left MCA and ICA aneurysm clipping.  There is some parenchymal edema adjacent to the surgical site involving the left anterior temporal lobe and posterior inferior frontal lobe with small focal intraparenchymal hemorrhage in the frontal lobe.  -Right superior cerebellar hemorrhage with localized edema, grossly unchanged from recent exams.  -Small volume subarachnoid and intraventricular hemorrhage elsewhere, unchanged.

## 2024-11-23 NOTE — PROGRESS NOTES
[facility] - Neuro Critical Care  Neurocritical Care  Progress Note    Admit Date: 11/20/2024  Service Date: 11/23/2024  Length of Stay: 3    Subjective:     Chief Complaint: Cerebral aneurysm    History of Present Illness: Isabel Coats is a 73-year-old female past medical history of hypertension, obstructive sleep apnea on CPAP, suggestive heart failure, obesity, history of gastric cancer status post chemoradiation and DVT in currently on Eliquis. She presents to [facility] pterional crani, 7 clips applied in proximity to large irregular L MCA bifurcation aneurysm and other adjacent small aneurysms, 1 clip applied across neck of aneurysm just proximal to anterior choroidal.  She also has a current smoker she was initially referred to Neurology for workup for dizziness. She had been complaining of vertigo multiple times a day for the past several months, associated with shortness of breath, palpitation, nausea, headache, and feeling faint. She is admitted to [facility] for a higher level of care.     Hospital Course: 11/21/2024 noted to have intermittentleft facial twitching and decreased mental status.   11/22/2024 left gaze preference. CT perfusion with decreased flow left insula  11/23/2024 frequent short runs of Vtach    Review of Symptoms: encephalopathic cannot participate    Medications:  Continuous Infusions:   0.9% NaCl   Intravenous Continuous 50 mL/hr at 11/23/24 1002 Rate Verify at 11/23/24 1002    nicardipine  0-15 mg/hr Intravenous Continuous 50 mL/hr at 11/23/24 1002 10 mg/hr at 11/23/24 1002     Scheduled Meds:   ceFAZolin (Ancef) IV (PEDS and ADULTS)  2 g Intravenous Q8H    heparin (porcine)  5,000 Units Subcutaneous Q8H    levETIRAcetam (Keppra) IV (PEDS and ADULTS)  1,000 mg Intravenous Q12H    mupirocin   Nasal BID    polyethylene glycol  17 g Per NG tube Daily    senna-docusate 8.6-50 mg  1 tablet Per NG tube Daily     PRN Meds:.  Current Facility-Administered Medications:     acetaminophen, 650 mg, Per NG  tube, Q4H PRN    bisacodyL, 10 mg, Rectal, Daily PRN    hydrALAZINE, 10 mg, Intravenous, Q6H PRN    labetalol, 10 mg, Intravenous, Once PRN    magnesium oxide, 800 mg, Per NG tube, PRN    magnesium oxide, 800 mg, Per NG tube, PRN    morphine, 2 mg, Intravenous, Q6H PRN    naloxone, 0.4 mg, Intravenous, PRN    ondansetron, 4 mg, Intravenous, Q12H PRN    oxyCODONE, 5 mg, Per NG tube, Q6H PRN    potassium bicarbonate, 35 mEq, Per NG tube, PRN    potassium bicarbonate, 50 mEq, Per NG tube, PRN    potassium bicarbonate, 60 mEq, Per NG tube, PRN    potassium, sodium phosphates, 2 packet, Per NG tube, PRN    potassium, sodium phosphates, 2 packet, Per NG tube, PRN    potassium, sodium phosphates, 2 packet, Per NG tube, PRN    sodium chloride 0.9%, 10 mL, Intravenous, PRN    OBJECTIVE:   Vital Signs (Most Recent):   Temp: 98.3 °F (36.8 °C) (11/23/24 0702)  Pulse: (!) 131 (11/23/24 1002)  Resp: (!) 34 (11/23/24 1002)  BP: (!) 115/57 (11/23/24 1002)  SpO2: (!) 94 % (11/23/24 1002)    Vital Signs (24h Range):   Temp:  [98.3 °F (36.8 °C)-98.8 °F (37.1 °C)] 98.3 °F (36.8 °C)  Pulse:  [] 131  Resp:  [23-40] 34  SpO2:  [91 %-98 %] 94 %  BP: ()/(48-68) 115/57  Arterial Line BP: ()/(42-65) 131/65    ICP/CPP (Last 24h):        I & O (Last 24h):   Intake/Output Summary (Last 24 hours) at 11/23/2024 1025  Last data filed at 11/23/2024 1002  Gross per 24 hour   Intake 2804.25 ml   Output 1342 ml   Net 1462.25 ml     Physical Exam:  GA: awake, comfortable, no acute distress.   HEENT: No scleral icterus or JVD. Neck supple  Pulmonary: Air entry equal to auscultation A/P/L. Wheezing no, crackles no  Cardiac: RRR, S1 & S2 w/o rubs/murmurs/gallops.   Abdominal: soft, non-tender, bowel sounds present x 4. No appreciable hepatosplenomegaly.  Skin: No jaundice, rashes, or visible lesions.  Neuro:  --GCS: 10  --Mental Status:  awake, follows  simple commands.  Impaired fluency  --Pupils 3.5 mm  EOMI  Right HH  --Corneal reflex  not done in this arousable patient, gag, cough intact.  Mild right hemiparesis  MSK:  No edema in UE and LE    Vent Data:        Lines/Drains/Airway:          PowerPort A Cath Single Lumen 07/09/21 1135 right internal jugular (Active)      Arterial Line 11/20/24 1431 Left Radial (Active)   Site Assessment Clean;Dry;Intact 11/23/24 0902   Line Status Pulsatile blood flow 11/23/24 0902   Art Line Waveform Appropriate 11/23/24 0902   Arterial Line Interventions Zeroed and calibrated;Leveled 11/23/24 0902   Color/Movement/Sensation Capillary refill less than 3 sec 11/23/24 0902   Dressing Type CHG impregnated dressing/sponge;Central line dressing 11/23/24 0902   Dressing Status Clean;Dry;Intact 11/23/24 0902   Dressing Intervention Integrity maintained 11/23/24 0902   Dressing Change Due 11/27/24 11/23/24 0902   Tubing Change Due 11/27/24 11/23/24 0902           Closed/Suction Drain 11/20/24 1930 Scalp Accordion 10 Fr. (Active)   Site Description Unable to view 11/23/24 0902   Dressing Type Gauze 11/23/24 0902   Dressing Status Clean;Dry;Intact 11/23/24 0902   Dressing Intervention Integrity maintained 11/23/24 0902   Drainage Serosanguineous 11/22/24 1505   Status To bulb suction 11/23/24 0902   Output (mL) 50 mL 11/23/24 0605            NG/OG Tube 11/21/24 1800 Right nostril (Active)   Placement Check placement verified by distal tube length measurement 11/23/24 0902   Tolerance no signs/symptoms of discomfort 11/23/24 0902   Securement secured to nostril center w/ adhesive device 11/23/24 0902   Clamp Status/Tolerance unclamped 11/23/24 0902   Suction Setting/Drainage Method suction at the bedside 11/23/24 0902   Insertion Site Appearance no redness, warmth, tenderness, skin breakdown, drainage 11/23/24 0902   Drainage None 11/23/24 0902   Flush/Irrigation flushed w/;water;no resistance met 11/23/24 0902   Feeding Type continuous;by pump 11/23/24 0902   Feeding Action feeding continued 11/23/24 0902   Current Rate  "(mL/hr) 10 mL/hr 11/22/24 1905   Intake (mL) 50 mL 11/23/24 0702   Water Bolus (mL) 60 mL 11/22/24 1605   Formula Name Isosource 1.5 11/23/24 0902   Intake (mL) - Formula Tube Feeding 10 11/23/24 0902   Residual Amount (ml) 0 ml 11/22/24 1505       Female External Urinary Catheter w/ Suction 11/21/24 1400 (Active)   Skin no redness;no breakdown 11/23/24 0902   Tolerance no signs/symptoms of discomfort 11/23/24 0902   Suction Continuous suction at 40 mmHg 11/23/24 0702   Date of last wick change 11/22/24 11/22/24 1905   Output (mL) 500 mL 11/23/24 0605       Nutrition/Tube Feeds (if NPO state why): NPO     Labs:  ABG:   No results for input(s): "PH", "PO2", "PCO2", "HCO3", "POCSATURATED", "BE" in the last 24 hours.    BMP:  Recent Labs   Lab 11/23/24  0117   *   K 3.5   *   CO2 21*   BUN 14   CREATININE 0.7   *   MG 2.1   PHOS 2.4*     LFT:   Lab Results   Component Value Date    AST 43 (H) 11/23/2024    ALT 12 11/23/2024    ALKPHOS 73 11/23/2024    BILITOT 0.5 11/23/2024    ALBUMIN 2.8 (L) 11/23/2024    PROT 5.6 (L) 11/23/2024     CBC:   Lab Results   Component Value Date    WBC 21.18 (H) 11/23/2024    HGB 8.2 (L) 11/23/2024    HCT 25.0 (L) 11/23/2024     (H) 11/23/2024     11/23/2024     Microbiology x 7d:   Microbiology Results (last 7 days)       ** No results found for the last 168 hours. **          Imaging:  CT head 11/21 - right cerebellar ICH  I personally reviewed the above image.  Today I independently reviewed pertinent medications, lines/drains/airways, imaging, cardiology results, laboratory results, microbiology results, notably:   ASSESSMENT/PLAN:     Active Hospital Problems    Diagnosis    *Cerebral aneurysm    Tobacco dependency    Chronic diastolic congestive heart failure    Pure hypercholesterolemia    Debility    Essential hypertension      Neuro:   Post clipping of multiple cerebral aneurysms  Interval cerebellar ICH  Plan for CTA/CTV head. Negative  for venous " sinus thrombosis  cEEG  r/o status epilepticus - reviewed with epilepsy team, no seizures  MRI brain - post surgical changes with post surgical edema  Na goal 140-150  Keppra 500mg [id number]    Pulmonary:   Saturating >92% on room air    Cardiac:   MAP goals 65-90mmhg.   Will discuss with neurosurgery to raise BP goals to test the hypothesis that neurological exam is perfusion dependent  D/c  Milrinone due to frequent short runs of Vtach    Renal:    Making urine  BUN/Cr <20    ID:   Afebrile, leucocytosis  Will monitor    Hem/Onc:   Stable hh and plt count    Endocrine:    BS stable will monitor    Fluids/Electrolytes/Nutrition/GI:     Lines:  Art +  CVC NA  ETT NA  Tatum NA  NG NA  PEG NA    Proph:  DVT:SCD/ no heparin   Constipation:  Last output:bowel regimen as needed  PUP: NA  VAP:NA    Critical condition in that Patient has a condition that poses threat to life and bodily function: ICH, cerebral edema,      37 minutes of Critical care time was spent personally by me on the following activities: development of treatment plan with patient or surrogate and bedside caregivers, discussions with consultants, evaluation of patient's response to treatment, examination of patient, ordering and performing treatments and interventions, ordering and review of laboratory studies, ordering and review of radiographic studies, pulse oximetry, antibiotic titration if applicable, vasopressor titration if applicable, re-evaluation of patient's condition. This critical care time did not overlap with that of any other provider or involve time for any procedures. There is high probability for acute neurological change leading to clinical and possibly life-threatening deterioration requiring highest level of physician preparedness for urgent intervention.    Gregor Velazquez MD, FNCS  Neurocritical care attending

## 2024-11-23 NOTE — PROCEDURES
ICU EEG/VIDEO MONITORING REPORT    DATE OF SERVICE: 11/22/24  EEG NUMBER: [id number]  [facility]    METHODOLOGY   Electroencephalographic (EEG) recording is with electrodes placed according to the International 10-20 placement system.  Thirty two (32) channels of digital signal are simultaneously recorded from the scalp and may include EKG, EMG, and/or eye monitors.   Recording band pass was 0.1 to 512 hz.  Digital video recording of the patient is simultaneously recorded with the EEG.  The nursing staff report clinical symptoms and may press an event button when the patient has symptoms of clinical interest to the treating physicians.  EEG and video recording is stored and archived in digital format.  The entire recording is visually reviewed and the times identified by computer analysis as being spikes or seizures are reviewed again.  Activation procedures which include photic stimulation, hyperventilation and instructing patients to perform simple task are done in selected patients.   Compresses spectral analysis (CSA) is also performed on the activity recorded from each individual channel.  This is displayed as a power display of frequencies from 0 to 30 Hz over time.   The CSA analysis is done and displayed continuously.  This is reviewed for asymmetries in power between homologous areas of the scalp and for presence of changes in power which canbe seen when seizures occur.  Sections of suspected abnormalities on the CSA is then compared with the original EEG recording.     Network [employer] software was also utilized in the review of this study.  This software suite analyzes the EEG recording in multiple domains.  Coherence and rhythmicity is computed to identify EEG sections which may contain organized seizures.  Each channel undergoes analysis to detect presence of spike and sharp waves which have special and morphological characteristic of epileptic activity.  The routine EEG recording is  converted from spacial into frequency domain.  This is then displayed comparing homologous areas to identify areas of significant asymmetry.  Algorithm to identify non-cortically generated artifact is used to separate eye movement, EMG and other artifact from the EEG.      Recording Times  Start on 11/21/24 at 12:19 for 17 hours 32 mins  Start 11/22/24 at 07:00 for 3 hours 48 mins    This EEG study is performed in the ICU with the patient on the following sedative medications: none    Indication:  73 year old woman with hx of admitted for management of HTN, LAURA, CHF, obesity, history of gastric cancer post chemoradiation and DVT currently on eliquis admitte dot NCC s/p L pterional crani, 7 clips applied in proximity to large irregular L MCA bifurcation aneurysm and other adjacent small aneurysms, 1 clip applied across neck of aneurysm just proximal to anterior choroidal. EEG initiated 11/22 for further evaluation after event of L facial twitching 11/21.    State of Consciousness:   Awake and asleep    Background:   The background is continuous and asymmetric.Reactivity is present.  The right hemisphere is composed of intermixed theta and delta activity with a poorly sustained PDR of 5-6 hz.  The left hemisphere consists mostly of delta activity with some occasional overriding theta frequencies and fragmentation of the PDR is seen.      Sleep:   There is transition into a sleep like state with poorly formed sleep architecture noted    Epileptiform Abnormalities  None    EKG:   Sinus rhythm    Seizures/Events:   None    Impression:   Abnormal study consistent with a moderate diffuse encephalopathy and a left hemispheric structural lesion.  No epileptiform activity is seen.      Lisset Tran MD  [facility]   Department of Neurology/Epilepsy

## 2024-11-23 NOTE — ASSESSMENT & PLAN NOTE
- SBP <120  - Nicardipine gtt and prn hydralazine     - Echo:   Left Ventricle: The left ventricle is normal in size. Normal wall thickness. There is normal systolic function with a visually estimated ejection fraction of 60 - 65%. There is normal diastolic function.    Right Ventricle: Normal right ventricular cavity size. Wall thickness is normal. Systolic function is normal.    Left Atrium: Left atrium is severely dilated.    Right Atrium: Right atrium is dilated.    Aortic Valve: The aortic valve is a trileaflet valve. There is moderate aortic valve sclerosis. Mildly restricted motion.    Tricuspid Valve: There is mild regurgitation.    IVC/SVC: Normal venous pressure at 3 mmHg.

## 2024-11-23 NOTE — ASSESSMENT & PLAN NOTE
Isabel Coats is a 73-year-old female past medical history of hypertension, obstructive sleep apnea on CPAP, suggestive heart failure, obesity, history of gastric cancer status post chemoradiation and DVT in currently on Eliquis. She presents to [facility] pterional crani, 7 clips applied in proximity to large irregular L MCA bifurcation aneurysm and other adjacent small aneurysms, 1 clip applied across neck of aneurysm just proximal to anterior choroidal.  She also has a current smoker she was initially referred to Neurology for workup for dizziness. She had been complaining of vertigo multiple times a day for the past several months, associated with shortness of breath, palpitation, nausea, headache, and feeling faint. She is admitted to [facility] for a higher level of care.      Imaging:  CTA Head 11/20: expected post-op changes  CTH 11/21 8 AM: right sided cerebellar IPH  CTA 11/21 10 AM: stable IPH, possible frontal contusion  CTP 11/21 4 PM: possible left anterior temporal perfusion deficit in territory of branches distal to MCA clips     Plans:  -Admitted to ICU  -q1h neurocheck  -All labs and imaging were reviewed  -Strict SBP <120, MAP >65  -Keep the drain to suction  -Continue to monitor respiratory status - ABG yesterday with normal CO2 at time of AMS  -keppra 1g BID. cEEG negative  -MRI- no strokes, expected post op changes, and stable cerebellar bleed.  -continue milrinone, currently at 0.5 - seems to have mild improvement with it started. keep euvolemic to net +. F/u TCDS per [facility]  -Pain control  -PT/OT/OOB  -Continue to monitor clinically, notify NSGY immediately with any changes in neuro status     Discussed with staff Dr. Simon

## 2024-11-23 NOTE — CONSULTS
[facility] - Neuro Critical Care  Wound Care    Patient Name:  Ca Coats   MRN:  [id number]  Date: 11/23/2024  Diagnosis: Cerebral aneurysm    Pt seen for wound consult- right upper thigh/groin. Pt lying in bed, sleeping; no family at bedside. Pt did wake briefly- garbled speech- unable to understand pt (per nurse, pt has been this way since admission). When pt was turned, large amount of liquid bm noted; assisted primary nurse with cleaning pt. Pt was continually stooling and a fecal manager was placed by primary nurse. Pt's sacrum and heels are clear; pt does have an incision to left side of scalp- dressing removed as it was loose, dried drainage. Pt's right upper thigh has a linear wound, possibly from moisture; no s/s infection. Applied Triad wound cream to protect and assist with wound healing. Pt's skin folds are clear but moist; pt is at risk for MASD due to deep skin folds and large pannus and being incontinent-suggest antifungal powder as a preventative. Also suggest heel lift boots as PIP.    Recs:  1.BID and prn: clean right thigh wound with wound cleanser; apply Triad wound cream. Clean skin folds with no rinse and apply antifungal powder-sprinkle then spread out to avoid clumping.     AURELIA LinderN, RN, CWON  [facility] [place] [facility] Wound/Ostomy  11/23/24 11/23/24 1110   WOCN Assessment   WOCN Total Time (mins) 75   Visit Date 11/23/24   Visit Time 1110   Consult Type New   WOCN Speciality Wound   Wound moisture;surgical   Number of Wounds 2   Intervention assessed;changed;applied;chart review;coordination of care   Teaching on-going        Wound 11/20/24 1846 Incision Left Scalp   Date First Assessed/Time First Assessed: 11/20/24 1846   Present on Original Admission: No  Primary Wound Type: Incision  Side: Left  Location: Scalp  Closure Method: Sutures;Other (see comments)  Additional Comments: baci, telfa, mastisol tape   Dressing Appearance Dried drainage   Drainage Amount Small    Drainage Characteristics/Odor No odor;Sanguineous   Appearance Red;Dry   Periwound Area Intact;Dry   Wound Edges Approximated        Wound 11/23/24 Skin Tear Right Groin   Date First Assessed: 11/23/24   Primary Wound Type: Skin Tear  Side: Right  Location: Groin   Wound Image    Dressing Appearance Open to air;No dressing   Drainage Amount None   Drainage Characteristics/Odor No odor   Appearance Pink;Yellow;White;Dry   Tissue loss description Partial thickness   Periwound Area Intact;Dry   Wound Edges Irregular   Wound Length (cm) 4 cm   Wound Width (cm) 0.2 cm   Wound Depth (cm) 0.1 cm   Wound Volume (cm^3) 0.08 cm^3   Wound Surface Area (cm^2) 0.8 cm^2   Care Cleansed with:;Wound cleanser   Dressing Applied;Other (comment)  (triad)       History:     Past Medical History:   Diagnosis Date    YOUNG (acute kidney injury) 10/15/2021    Anemia     2018    Arthritis     BMI 60.0-69.9, adult     Cataract     Chronic diastolic congestive heart failure 01/27/2021    Coronary artery disease involving native coronary artery of native heart without angina pectoris 11/15/2024    Deep vein thrombosis     Depression     Dry eye syndrome     DVT of deep femoral vein, right 05/29/2023    Gastric adenocarcinoma 05/25/2021    GERD (gastroesophageal reflux disease)     Glaucoma suspect     History of gastric ulcer     History of psychiatric hospitalization     Hx of psychiatric care     Celexa, no recall of charted Effexor, Lexapro, Desyrel prescriptions    Hyperlipidemia     Hypertension     Hypothyroidism     Morbid obesity     Neuromuscular disorder     Sleep apnea     Thyroid disease        Social History     Socioeconomic History    Marital status: Single    Number of children: 2   Occupational History     Comment: retired  and cook   Tobacco Use    Smoking status: Some Days     Current packs/day: 0.00     Average packs/day: 1 pack/day for 53.9 years (53.7 ttl pk-yrs)     Types: Cigarettes     Start date: 1970      Last attempt to quit: 2023     Years since quittin.9    Smokeless tobacco: Never    Tobacco comments:     currently smoking <5 cigarettes most days   Substance and Sexual Activity    Alcohol use: Yes     Alcohol/week: 1.0 standard drink of alcohol     Types: 1 Glasses of wine per week     Comment: rarely    Drug use: No    Sexual activity: Not Currently     Partners: Male   Social History Narrative    2 children (dtr in area, son in [place]) and she has 3 grandkids (in [place]),    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    Originally from [facility]     Social Drivers of Health     Financial Resource Strain: Medium Risk (2024)    Overall Financial Resource Strain (CARDIA)     Difficulty of Paying Living Expenses: Somewhat hard   Food Insecurity: No Food Insecurity (2024)    Hunger Vital Sign     Worried About Running Out of Food in the Last Year: Never true     Ran Out of Food in the Last Year: Never true   Recent Concern: Food Insecurity - Food Insecurity Present (10/11/2024)    Hunger Vital Sign     Worried About Running Out of Food in the Last Year: Sometimes true     Ran Out of Food in the Last Year: Never true   Transportation Needs: No Transportation Needs (2024)    TRANSPORTATION NEEDS     Transportation : No   Physical Activity: Unknown (10/11/2024)    Exercise Vital Sign     Days of Exercise per Week: 5 days   Recent Concern: Physical Activity - Inactive (10/11/2024)    Exercise Vital Sign     Days of Exercise per Week: 5 days     Minutes of Exercise per Session: 0 min   Stress: Patient Unable To Answer (2024)    [place] [employer] - Occupational Stress Questionnaire     Feeling of Stress : Patient unable to answer   Recent Concern: Stress - Stress Concern Present (10/11/2024)    [employer] [place] [employer] - Occupational Stress Questionnaire     Feeling of Stress : Very much   Housing Stability: Low Risk  (2024)    Housing Stability  Vital Sign     Unable to Pay for Housing in the Last Year: No     Homeless in the Last Year: No       Precautions:     Allergies as of 10/08/2024    (No Known Allergies)       WOC Assessment Details/Treatment   See above

## 2024-11-23 NOTE — PLAN OF CARE
"[facility] Care Plan    POC reviewed with Ca Coats  at 0300. Patient unable to verbalize understanding. Questions and concerns addressed. No acute events today. Pt progressing toward goals. Will continue to monitor. See below and flowsheets for full assessment and VS info.     -cardene infusing at 7.5 mg/hr titrated to maintain SBP <120 MAP 65-85   -milrinone at 0.5  -trickle TF started  -HV drain output-50  -oxycodone 5 mg given x1, tylenol given x1 for pain.    Is this a stroke patient? yes- Stroke booklet reviewed with patient and family, risk factors identified for patient and stroke booklet remains at bedside for ongoing education.     Care individualization: calming measure utilized,deep breath exercises encouraged    Neuro:  Iker Coma Scale  Best Eye Response: 4-->(E4) spontaneous  Best Motor Response: 6-->(M6) obeys commands  Best Verbal Response: 3-->(V3) inappropriate words  Iker Coma Scale Score: 13  Assessment Qualifiers: patient not sedated/intubated  Pupil PERRLA: no     24 hr Temp:  [98.5 °F (36.9 °C)-98.8 °F (37.1 °C)]     CV:   Rhythm: sinus tachycardia  BP goals:   SBP <  120  MAP > 65    Resp:           Plan: N/A    GI/:     Diet/Nutrition Received: tube feeding  Last Bowel Movement: 11/23/24  Voiding Characteristics: external catheter    Intake/Output Summary (Last 24 hours) at 11/23/2024 0635  Last data filed at 11/23/2024 0605  Gross per 24 hour   Intake 2670.59 ml   Output 1392 ml   Net 1278.59 ml     Unmeasured Output  Stool Occurrence: 1  Pad Count: 1    Labs/Accuchecks:  Recent Labs   Lab 11/23/24  0117   WBC 21.18*   RBC 2.50*   HGB 8.2*   HCT 25.0*         Recent Labs   Lab 11/23/24  0117   *   K 3.5   CO2 21*   *   BUN 14   CREATININE 0.7   ALKPHOS 73   ALT 12   AST 43*   BILITOT 0.5      Recent Labs   Lab 11/21/24  0904   INR 1.0   APTT 25.4    No results for input(s): "CPK", "CPKMB", "TROPONINI", "MB" in the last 168 hours.    Electrolytes: " Electrolytes replaced  Accuchecks: none    Gtts:   0.9% NaCl   Intravenous Continuous 50 mL/hr at 24 Rate Verify at 24    milrinone 20mg/100ml D5W (200mcg/ml)  0.5 mcg/kg/min Intravenous Continuous 18.5 mL/hr at 24 0.5 mcg/kg/min at 24    nicardipine  0-15 mg/hr Intravenous Continuous 25 mL/hr at 24 5 mg/hr at 24       LDA/Wounds:    Domenico Risk Assessment  Sensory Perception: 3-->slightly limited  Moisture: 3-->occasionally moist  Activity: 1-->bedfast  Mobility: 3-->slightly limited  Nutrition: 2-->probably inadequate  Friction and Shear: 2-->potential problem  Domenico Score: 14  Is your domenico score 12 or less? no          Restraints:   Restraint Order  Length of Order: Order good for next 24 hours or when removed.  Date that the current order will : 24  Time that the current order will :   Order Upon Application: Yes    [facility]

## 2024-11-23 NOTE — CONSULTS
Consulted to jadiel LOZANO.   Pt already being followed - see full note from 11/21.       Recommendations     TF recs: Isosource 1.5 @ 50 ml/hr. Provides 1800 kcals, 82 g protein, 211 g CHO, 18 g fiber, 917 ml fluid. Flush per MD.  Advance to cardiac diet when able, texture per SLP.   RD to monitor and follow up.     Goals: Meet % EEN/EPN by RD follow up.  Nutrition Goal Status: new  Communication of RD Recs: other (comment) (POC)

## 2024-11-23 NOTE — PROGRESS NOTES
[facility] - Neuro Critical Care  Neurosurgery  Progress Note    Subjective:     History of Present Illness: Isabel Coats is a 73-year-old female past medical history of hypertension, obstructive sleep apnea on CPAP, suggestive heart failure, obesity, history of gastric cancer status post chemoradiation and DVT in currently on Eliquis. She presents to [facility] pterional crani, 7 clips applied in proximity to large irregular L MCA bifurcation aneurysm and other adjacent small aneurysms, 1 clip applied across neck of aneurysm just proximal to anterior choroidal.  She also has a current smoker she was initially referred to Neurology for workup for dizziness. She had been complaining of vertigo multiple times a day for the past several months, associated with shortness of breath, palpitation, nausea, headache, and feeling faint. She is admitted to [facility] for a higher level of care.     Post-Op Info:  Procedure(s) (LRB):  CRANIOTOMY, WITH ANEURYSM CLIPPING W/ STEALTH (Left)  CRANIOTOMY, FOR ANEURYSM OF VERTEBROBASILAR OR CAROTID CIRCULATION (Left)  DISSECTION, NERVE, USING OPERATING MICROSCOPE (Left)   3 Days Post-Op   Interval History: NAEON. Pt exam stable this morning still not making sense verbally but following commands in uppers. Spont in lowers. HV put out 92cc will keep today. Pt is +1300cc fluid status and sodium of 147.    Medications:  Continuous Infusions:   0.9% NaCl   Intravenous Continuous 50 mL/hr at 11/23/24 0702 Rate Verify at 11/23/24 0702    milrinone 20mg/100ml D5W (200mcg/ml)  0.5 mcg/kg/min Intravenous Continuous 18.5 mL/hr at 11/23/24 0702 0.5 mcg/kg/min at 11/23/24 0702    nicardipine  0-15 mg/hr Intravenous Continuous 50 mL/hr at 11/23/24 0702 10 mg/hr at 11/23/24 0702     Scheduled Meds:   ceFAZolin (Ancef) IV (PEDS and ADULTS)  2 g Intravenous Q8H    heparin (porcine)  5,000 Units Subcutaneous Q8H    levETIRAcetam (Keppra) IV (PEDS and ADULTS)  1,000 mg Intravenous Q12H    mupirocin   Nasal BID     polyethylene glycol  17 g Per NG tube Daily    senna-docusate 8.6-50 mg  1 tablet Per NG tube Daily     PRN Meds:  Current Facility-Administered Medications:     acetaminophen, 650 mg, Per NG tube, Q4H PRN    bisacodyL, 10 mg, Rectal, Daily PRN    hydrALAZINE, 10 mg, Intravenous, Q6H PRN    labetalol, 10 mg, Intravenous, Once PRN    magnesium oxide, 800 mg, Per NG tube, PRN    magnesium oxide, 800 mg, Per NG tube, PRN    morphine, 2 mg, Intravenous, Q6H PRN    naloxone, 0.4 mg, Intravenous, PRN    ondansetron, 4 mg, Intravenous, Q12H PRN    oxyCODONE, 5 mg, Per NG tube, Q6H PRN    potassium bicarbonate, 35 mEq, Per NG tube, PRN    potassium bicarbonate, 50 mEq, Per NG tube, PRN    potassium bicarbonate, 60 mEq, Per NG tube, PRN    potassium, sodium phosphates, 2 packet, Per NG tube, PRN    potassium, sodium phosphates, 2 packet, Per NG tube, PRN    potassium, sodium phosphates, 2 packet, Per NG tube, PRN    sodium chloride 0.9%, 10 mL, Intravenous, PRN     Review of Systems  Objective:     Weight: 123.4 kg (272 lb)  Body mass index is 43.9 kg/m².  Vital Signs (Most Recent):  Temp: 98.3 °F (36.8 °C) (11/23/24 0702)  Pulse: 96 (11/23/24 0702)  Resp: (!) 30 (11/23/24 0737)  BP: (!) 106/54 (11/23/24 0702)  SpO2: 96 % (11/23/24 0702) Vital Signs (24h Range):  Temp:  [98.3 °F (36.8 °C)-98.8 °F (37.1 °C)] 98.3 °F (36.8 °C)  Pulse:  [] 96  Resp:  [23-40] 30  SpO2:  [91 %-98 %] 96 %  BP: ()/(48-68) 106/54  Arterial Line BP: ()/(42-58) 121/49     Date 11/23/24 0700 - 11/24/24 0659   Shift 5944-5699 7177-6167 7897-4094 24 Hour Total   INTAKE   I.V.(mL/kg) 103.8(0.8)   103.8(0.8)   NG/GT 60   60   Shift Total(mL/kg) 163.8(1.3)   163.8(1.3)   OUTPUT   Shift Total(mL/kg)       Weight (kg) 123.4 123.4 123.4 123.4                            Closed/Suction Drain 11/20/24 1930 Scalp Accordion 10 Fr. (Active)   Site Description Unable to view 11/23/24 0702   Dressing Type Gauze 11/23/24 0702   Dressing Status  Clean;Dry;Intact 11/23/24 0702   Dressing Intervention Integrity maintained 11/23/24 0702   Drainage Serosanguineous 11/22/24 1505   Status To bulb suction 11/23/24 0702   Output (mL) 50 mL 11/23/24 0605            NG/OG Tube 11/21/24 1800 Right nostril (Active)   Placement Check placement verified by distal tube length measurement 11/23/24 0702   Tolerance no signs/symptoms of discomfort 11/23/24 0702   Securement secured to nostril center w/ adhesive device 11/23/24 0702   Clamp Status/Tolerance unclamped 11/23/24 0702   Suction Setting/Drainage Method suction at the bedside 11/23/24 0702   Insertion Site Appearance no redness, warmth, tenderness, skin breakdown, drainage 11/23/24 0702   Drainage None 11/23/24 0702   Flush/Irrigation flushed w/;water;no resistance met 11/23/24 0702   Feeding Type continuous;by pump 11/23/24 0702   Feeding Action feeding continued 11/23/24 0702   Current Rate (mL/hr) 10 mL/hr 11/22/24 1905   Intake (mL) 50 mL 11/23/24 0702   Water Bolus (mL) 60 mL 11/22/24 1605   Formula Name Isosource 1.5 11/23/24 0702   Intake (mL) - Formula Tube Feeding 10 11/23/24 0702   Residual Amount (ml) 0 ml 11/22/24 1505       Female External Urinary Catheter w/ Suction 11/21/24 1400 (Active)   Skin no redness;no breakdown 11/23/24 0702   Tolerance no signs/symptoms of discomfort 11/23/24 0702   Suction Continuous suction at 40 mmHg 11/23/24 0702   Date of last wick change 11/22/24 11/22/24 1905   Output (mL) 500 mL 11/23/24 0605          Physical Exam         Neurosurgery Physical Exam    Neuro:   Mental Status: E3V3M6, AO x0,   CN: PERRL,   Motor: moves all extremities spontaneously, FC in uppers, spont lowers.    Significant Labs:  Recent Labs   Lab 11/21/24  2214 11/22/24  0228 11/23/24  0117   * 130* 111*   * 146* 147*   K 3.2* 3.9 3.5   * 116* 116*   CO2 19* 19* 21*   BUN 11 12 14   CREATININE 0.8 0.8 0.7   CALCIUM 8.7 8.8 8.5*   MG 1.7 1.8 2.1     Recent Labs   Lab 11/22/24  0228  11/23/24  0117   WBC 27.74* 21.18*   HGB 8.8* 8.2*   HCT 26.2* 25.0*    186     Recent Labs   Lab 11/21/24  0904   INR 1.0   APTT 25.4     Microbiology Results (last 7 days)       ** No results found for the last 168 hours. **          All pertinent labs from the last 24 hours have been reviewed.    Significant Diagnostics:  I have reviewed all pertinent imaging results/findings within the past 24 hours.  Assessment/Plan:     * Cerebral aneurysm  Isabel Coats is a 73-year-old female past medical history of hypertension, obstructive sleep apnea on CPAP, suggestive heart failure, obesity, history of gastric cancer status post chemoradiation and DVT in currently on Eliquis. She presents to [facility] pterional crani, 7 clips applied in proximity to large irregular L MCA bifurcation aneurysm and other adjacent small aneurysms, 1 clip applied across neck of aneurysm just proximal to anterior choroidal.  She also has a current smoker she was initially referred to Neurology for workup for dizziness. She had been complaining of vertigo multiple times a day for the past several months, associated with shortness of breath, palpitation, nausea, headache, and feeling faint. She is admitted to [facility] for a higher level of care.      Imaging:  CTA Head 11/20: expected post-op changes  CTH 11/21 8 AM: right sided cerebellar IPH  CTA 11/21 10 AM: stable IPH, possible frontal contusion  CTP 11/21 4 PM: possible left anterior temporal perfusion deficit in territory of branches distal to MCA clips     Plans:  -Admitted to ICU  -q1h neurocheck  -All labs and imaging were reviewed  -Strict SBP <120, MAP >65  -Keep the drain to suction  -Continue to monitor respiratory status - ABG yesterday with normal CO2 at time of AMS  -keppra 1g BID. cEEG negative  -MRI- no strokes, expected post op changes, and stable cerebellar bleed.  -continue milrinone, currently at 0.5 - seems to have mild improvement with it started. keep euvolemic to net  +. F/u TCDS per NCC  -Pain control  -PT/OT/OOB  -Continue to monitor clinically, notify NSGY immediately with any changes in neuro status     Discussed with staff Dr. Alfred Dexter MD  Neurosurgery  [facility] - [facility]

## 2024-11-23 NOTE — PLAN OF CARE
"[facility] Care Plan  POC reviewed with Ca Coats and family at 1500. Patient and Family verbalized understanding. Questions and concerns addressed. No acute events today. Pt progressing toward goals. Will continue to monitor. See below and flowsheets for full assessment and VS info.     - Milrinone gtt d/c  - MAP goal changed to   - SBP goal changed to < 160  - Trickle Feeds continued  - PRN Tylenol, Oxy, and Morphine given for pain  - Flexi placed for frequent liquid bowel movements  - Hemovac Output: 60 mL    Is this a stroke patient? no    Neuro:  Iker Coma Scale  Best Eye Response: 4-->(E4) spontaneous  Best Motor Response: 6-->(M6) obeys commands  Best Verbal Response: 2-->(V2) incomprehensible speech  [place] Coma Scale Score: 12  Assessment Qualifiers: patient not sedated/intubated  Pupil PERRLA: no     24 hr Temp:  [97.7 °F (36.5 °C)-98.8 °F (37.1 °C)]     CV:   Rhythm: normal sinus rhythm  BP goals:   SBP < 160  MAP -     Resp:           Plan: N/A    GI/:     Diet/Nutrition Received: tube feeding  Last Bowel Movement: 11/23/24  Voiding Characteristics: external catheter    Intake/Output Summary (Last 24 hours) at 11/23/2024 1541  Last data filed at 11/23/2024 1502  Gross per 24 hour   Intake 3079 ml   Output 900 ml   Net 2179 ml     Unmeasured Output  Urine Occurrence: 1  Stool Occurrence: 2  Pad Count: 1    Labs/Accuchecks:  Recent Labs   Lab 11/23/24  0117   WBC 21.18*   RBC 2.50*   HGB 8.2*   HCT 25.0*         Recent Labs   Lab 11/23/24  0117   *   K 3.5   CO2 21*   *   BUN 14   CREATININE 0.7   ALKPHOS 73   ALT 12   AST 43*   BILITOT 0.5      Recent Labs   Lab 11/21/24  0904   INR 1.0   APTT 25.4    No results for input(s): "CPK", "CPKMB", "TROPONINI", "MB" in the last 168 hours.    Electrolytes: Electrolytes replaced  Accuchecks: none    Gtts:   0.9% NaCl   Intravenous Continuous 50 mL/hr at 11/23/24 1502 Rate Verify at 11/23/24 1502    nicardipine  0-15 " mg/hr Intravenous Continuous           LDA/Wounds:    Domenico Risk Assessment  Sensory Perception: 3-->slightly limited  Moisture: 3-->occasionally moist  Activity: 1-->bedfast  Mobility: 3-->slightly limited  Nutrition: 2-->probably inadequate  Friction and Shear: 2-->potential problem  Domenico Score: 14    Is your domenico score 12 or less? no      Restraints:   Restraint Order  Length of Order: Order good for next 24 hours or when removed.  Date that the current order will : 24  Time that the current order will : 1500  Order Upon Application: Yes    [facility]

## 2024-11-23 NOTE — ASSESSMENT & PLAN NOTE
STABLE  Appears to be euvolemic and maintains hemodynamic stability.      Left Ventricle: The left ventricle is normal in size. Normal wall thickness. There is normal systolic function with a visually estimated ejection fraction of 60 - 65%. There is normal diastolic function.    Right Ventricle: Normal right ventricular cavity size. Wall thickness is normal. Systolic function is normal.    Left Atrium: Left atrium is severely dilated.    Right Atrium: Right atrium is dilated.    Aortic Valve: The aortic valve is a trileaflet valve. There is moderate aortic valve sclerosis. Mildly restricted motion.    Tricuspid Valve: There is mild regurgitation.    IVC/SVC: Normal venous pressure at 3 mmHg.

## 2024-11-23 NOTE — SUBJECTIVE & OBJECTIVE
Interval History: NAEON. Pt exam stable this morning still not making sense verbally but following commands in uppers. Spont in lowers. HV put out 92cc will keep today. Pt is +1300cc fluid status and sodium of 147.    Medications:  Continuous Infusions:   0.9% NaCl   Intravenous Continuous 50 mL/hr at 11/23/24 0702 Rate Verify at 11/23/24 0702    milrinone 20mg/100ml D5W (200mcg/ml)  0.5 mcg/kg/min Intravenous Continuous 18.5 mL/hr at 11/23/24 0702 0.5 mcg/kg/min at 11/23/24 0702    nicardipine  0-15 mg/hr Intravenous Continuous 50 mL/hr at 11/23/24 0702 10 mg/hr at 11/23/24 0702     Scheduled Meds:   ceFAZolin (Ancef) IV (PEDS and ADULTS)  2 g Intravenous Q8H    heparin (porcine)  5,000 Units Subcutaneous Q8H    levETIRAcetam (Keppra) IV (PEDS and ADULTS)  1,000 mg Intravenous Q12H    mupirocin   Nasal BID    polyethylene glycol  17 g Per NG tube Daily    senna-docusate 8.6-50 mg  1 tablet Per NG tube Daily     PRN Meds:  Current Facility-Administered Medications:     acetaminophen, 650 mg, Per NG tube, Q4H PRN    bisacodyL, 10 mg, Rectal, Daily PRN    hydrALAZINE, 10 mg, Intravenous, Q6H PRN    labetalol, 10 mg, Intravenous, Once PRN    magnesium oxide, 800 mg, Per NG tube, PRN    magnesium oxide, 800 mg, Per NG tube, PRN    morphine, 2 mg, Intravenous, Q6H PRN    naloxone, 0.4 mg, Intravenous, PRN    ondansetron, 4 mg, Intravenous, Q12H PRN    oxyCODONE, 5 mg, Per NG tube, Q6H PRN    potassium bicarbonate, 35 mEq, Per NG tube, PRN    potassium bicarbonate, 50 mEq, Per NG tube, PRN    potassium bicarbonate, 60 mEq, Per NG tube, PRN    potassium, sodium phosphates, 2 packet, Per NG tube, PRN    potassium, sodium phosphates, 2 packet, Per NG tube, PRN    potassium, sodium phosphates, 2 packet, Per NG tube, PRN    sodium chloride 0.9%, 10 mL, Intravenous, PRN     Review of Systems  Objective:     Weight: 123.4 kg (272 lb)  Body mass index is 43.9 kg/m².  Vital Signs (Most Recent):  Temp: 98.3 °F (36.8 °C) (11/23/24  0702)  Pulse: 96 (11/23/24 0702)  Resp: (!) 30 (11/23/24 0737)  BP: (!) 106/54 (11/23/24 0702)  SpO2: 96 % (11/23/24 0702) Vital Signs (24h Range):  Temp:  [98.3 °F (36.8 °C)-98.8 °F (37.1 °C)] 98.3 °F (36.8 °C)  Pulse:  [] 96  Resp:  [23-40] 30  SpO2:  [91 %-98 %] 96 %  BP: ()/(48-68) 106/54  Arterial Line BP: ()/(42-58) 121/49     Date 11/23/24 0700 - 11/24/24 0659   Shift 1795-8139 0942-5847 2854-3415 24 Hour Total   INTAKE   I.V.(mL/kg) 103.8(0.8)   103.8(0.8)   NG/GT 60   60   Shift Total(mL/kg) 163.8(1.3)   163.8(1.3)   OUTPUT   Shift Total(mL/kg)       Weight (kg) 123.4 123.4 123.4 123.4                            Closed/Suction Drain 11/20/24 1930 Scalp Accordion 10 Fr. (Active)   Site Description Unable to view 11/23/24 0702   Dressing Type Gauze 11/23/24 0702   Dressing Status Clean;Dry;Intact 11/23/24 0702   Dressing Intervention Integrity maintained 11/23/24 0702   Drainage Serosanguineous 11/22/24 1505   Status To bulb suction 11/23/24 0702   Output (mL) 50 mL 11/23/24 0605            NG/OG Tube 11/21/24 1800 Right nostril (Active)   Placement Check placement verified by distal tube length measurement 11/23/24 0702   Tolerance no signs/symptoms of discomfort 11/23/24 0702   Securement secured to nostril center w/ adhesive device 11/23/24 0702   Clamp Status/Tolerance unclamped 11/23/24 0702   Suction Setting/Drainage Method suction at the bedside 11/23/24 0702   Insertion Site Appearance no redness, warmth, tenderness, skin breakdown, drainage 11/23/24 0702   Drainage None 11/23/24 0702   Flush/Irrigation flushed w/;water;no resistance met 11/23/24 0702   Feeding Type continuous;by pump 11/23/24 0702   Feeding Action feeding continued 11/23/24 0702   Current Rate (mL/hr) 10 mL/hr 11/22/24 1905   Intake (mL) 50 mL 11/23/24 0702   Water Bolus (mL) 60 mL 11/22/24 1605   Formula Name Isosource 1.5 11/23/24 0702   Intake (mL) - Formula Tube Feeding 10 11/23/24 0702   Residual Amount (ml) 0  ml 11/22/24 1505       Female External Urinary Catheter w/ Suction 11/21/24 1400 (Active)   Skin no redness;no breakdown 11/23/24 0702   Tolerance no signs/symptoms of discomfort 11/23/24 0702   Suction Continuous suction at 40 mmHg 11/23/24 0702   Date of last wick change 11/22/24 11/22/24 1905   Output (mL) 500 mL 11/23/24 0605          Physical Exam         Neurosurgery Physical Exam    Neuro:   Mental Status: E3V3M6, AO x0,   CN: PERRL,   Motor: moves all extremities spontaneously, FC in uppers, spont lowers.    Significant Labs:  Recent Labs   Lab 11/21/24  2214 11/22/24 0228 11/23/24  0117   * 130* 111*   * 146* 147*   K 3.2* 3.9 3.5   * 116* 116*   CO2 19* 19* 21*   BUN 11 12 14   CREATININE 0.8 0.8 0.7   CALCIUM 8.7 8.8 8.5*   MG 1.7 1.8 2.1     Recent Labs   Lab 11/22/24 0228 11/23/24  0117   WBC 27.74* 21.18*   HGB 8.8* 8.2*   HCT 26.2* 25.0*    186     Recent Labs   Lab 11/21/24  0904   INR 1.0   APTT 25.4     Microbiology Results (last 7 days)       ** No results found for the last 168 hours. **          All pertinent labs from the last 24 hours have been reviewed.    Significant Diagnostics:  I have reviewed all pertinent imaging results/findings within the past 24 hours.

## 2024-11-24 LAB
ABO + RH BLD: NORMAL
ALBUMIN SERPL BCP-MCNC: 2.9 G/DL (ref 3.5–5.2)
ALP SERPL-CCNC: 79 U/L (ref 40–150)
ALT SERPL W/O P-5'-P-CCNC: 12 U/L (ref 10–44)
ANION GAP SERPL CALC-SCNC: 9 MMOL/L (ref 8–16)
AST SERPL-CCNC: 33 U/L (ref 10–40)
BASOPHILS # BLD AUTO: 0.02 K/UL (ref 0–0.2)
BASOPHILS NFR BLD: 0.1 % (ref 0–1.9)
BILIRUB SERPL-MCNC: 0.5 MG/DL (ref 0.1–1)
BLD GP AB SCN CELLS X3 SERPL QL: NORMAL
BLD PROD TYP BPU: NORMAL
BLD PROD TYP BPU: NORMAL
BLOOD UNIT EXPIRATION DATE: NORMAL
BLOOD UNIT EXPIRATION DATE: NORMAL
BLOOD UNIT TYPE CODE: 7300
BLOOD UNIT TYPE CODE: 7300
BLOOD UNIT TYPE: NORMAL
BLOOD UNIT TYPE: NORMAL
BUN SERPL-MCNC: 11 MG/DL (ref 8–23)
CALCIUM SERPL-MCNC: 8.8 MG/DL (ref 8.7–10.5)
CHLORIDE SERPL-SCNC: 118 MMOL/L (ref 95–110)
CO2 SERPL-SCNC: 21 MMOL/L (ref 23–29)
CODING SYSTEM: NORMAL
CODING SYSTEM: NORMAL
CREAT SERPL-MCNC: 0.7 MG/DL (ref 0.5–1.4)
CROSSMATCH INTERPRETATION: NORMAL
CROSSMATCH INTERPRETATION: NORMAL
DIFFERENTIAL METHOD BLD: ABNORMAL
DISPENSE STATUS: NORMAL
DISPENSE STATUS: NORMAL
EOSINOPHIL # BLD AUTO: 0 K/UL (ref 0–0.5)
EOSINOPHIL NFR BLD: 0.1 % (ref 0–8)
ERYTHROCYTE [DISTWIDTH] IN BLOOD BY AUTOMATED COUNT: 13.7 % (ref 11.5–14.5)
EST. GFR  (NO RACE VARIABLE): >60 ML/MIN/1.73 M^2
GLUCOSE SERPL-MCNC: 108 MG/DL (ref 70–110)
HCT VFR BLD AUTO: 26.8 % (ref 37–48.5)
HGB BLD-MCNC: 8.5 G/DL (ref 12–16)
IMM GRANULOCYTES # BLD AUTO: 0.15 K/UL (ref 0–0.04)
IMM GRANULOCYTES NFR BLD AUTO: 1 % (ref 0–0.5)
LYMPHOCYTES # BLD AUTO: 2 K/UL (ref 1–4.8)
LYMPHOCYTES NFR BLD: 13 % (ref 18–48)
MAGNESIUM SERPL-MCNC: 2.3 MG/DL (ref 1.6–2.6)
MCH RBC QN AUTO: 32.1 PG (ref 27–31)
MCHC RBC AUTO-ENTMCNC: 31.7 G/DL (ref 32–36)
MCV RBC AUTO: 101 FL (ref 82–98)
MONOCYTES # BLD AUTO: 1.5 K/UL (ref 0.3–1)
MONOCYTES NFR BLD: 9.8 % (ref 4–15)
NEUTROPHILS # BLD AUTO: 11.5 K/UL (ref 1.8–7.7)
NEUTROPHILS NFR BLD: 76 % (ref 38–73)
NRBC BLD-RTO: 0 /100 WBC
NUM UNITS TRANS PACKED RBC: NORMAL
NUM UNITS TRANS PACKED RBC: NORMAL
OHS QRS DURATION: 66 MS
OHS QTC CALCULATION: 401 MS
PHOSPHATE SERPL-MCNC: 2.3 MG/DL (ref 2.7–4.5)
PLATELET # BLD AUTO: 184 K/UL (ref 150–450)
PMV BLD AUTO: 10.7 FL (ref 9.2–12.9)
POTASSIUM SERPL-SCNC: 3.7 MMOL/L (ref 3.5–5.1)
PROT SERPL-MCNC: 6 G/DL (ref 6–8.4)
RBC # BLD AUTO: 2.65 M/UL (ref 4–5.4)
SODIUM SERPL-SCNC: 148 MMOL/L (ref 136–145)
SPECIMEN OUTDATE: NORMAL
WBC # BLD AUTO: 15.12 K/UL (ref 3.9–12.7)

## 2024-11-24 PROCEDURE — 97165 OT EVAL LOW COMPLEX 30 MIN: CPT | Mod: HCNC

## 2024-11-24 PROCEDURE — 25000003 PHARM REV CODE 250: Mod: HCNC

## 2024-11-24 PROCEDURE — 97162 PT EVAL MOD COMPLEX 30 MIN: CPT | Mod: HCNC

## 2024-11-24 PROCEDURE — 80053 COMPREHEN METABOLIC PANEL: CPT | Mod: HCNC

## 2024-11-24 PROCEDURE — 97535 SELF CARE MNGMENT TRAINING: CPT | Mod: HCNC

## 2024-11-24 PROCEDURE — 97530 THERAPEUTIC ACTIVITIES: CPT | Mod: HCNC

## 2024-11-24 PROCEDURE — 20000000 HC ICU ROOM: Mod: HCNC

## 2024-11-24 PROCEDURE — 25000003 PHARM REV CODE 250: Mod: HCNC | Performed by: NURSE PRACTITIONER

## 2024-11-24 PROCEDURE — 63600175 PHARM REV CODE 636 W HCPCS: Mod: HCNC

## 2024-11-24 PROCEDURE — 84100 ASSAY OF PHOSPHORUS: CPT | Mod: HCNC | Performed by: NURSE PRACTITIONER

## 2024-11-24 PROCEDURE — 86901 BLOOD TYPING SEROLOGIC RH(D): CPT | Mod: HCNC

## 2024-11-24 PROCEDURE — 83735 ASSAY OF MAGNESIUM: CPT | Mod: HCNC | Performed by: NURSE PRACTITIONER

## 2024-11-24 PROCEDURE — 85025 COMPLETE CBC W/AUTO DIFF WBC: CPT | Mod: HCNC | Performed by: NURSE PRACTITIONER

## 2024-11-24 PROCEDURE — 99291 CRITICAL CARE FIRST HOUR: CPT | Mod: HCNC,,, | Performed by: PSYCHIATRY & NEUROLOGY

## 2024-11-24 RX ORDER — AMLODIPINE BESYLATE 10 MG/1
10 TABLET ORAL DAILY
Status: CANCELLED | OUTPATIENT
Start: 2024-11-24

## 2024-11-24 RX ORDER — HYDRALAZINE HYDROCHLORIDE 20 MG/ML
10 INJECTION INTRAMUSCULAR; INTRAVENOUS EVERY 6 HOURS PRN
Status: CANCELLED | OUTPATIENT
Start: 2024-11-24

## 2024-11-24 RX ORDER — IPRATROPIUM BROMIDE AND ALBUTEROL SULFATE 2.5; .5 MG/3ML; MG/3ML
3 SOLUTION RESPIRATORY (INHALATION) EVERY 6 HOURS PRN
Status: DISCONTINUED | OUTPATIENT
Start: 2024-11-24 | End: 2024-11-26

## 2024-11-24 RX ADMIN — MUPIROCIN: 20 OINTMENT TOPICAL at 08:11

## 2024-11-24 RX ADMIN — NICARDIPINE HYDROCHLORIDE 2.5 MG/HR: 0.2 INJECTION INTRAVENOUS at 05:11

## 2024-11-24 RX ADMIN — CEFAZOLIN 2 G: 2 INJECTION, POWDER, FOR SOLUTION INTRAMUSCULAR; INTRAVENOUS at 09:11

## 2024-11-24 RX ADMIN — CEFAZOLIN 2 G: 2 INJECTION, POWDER, FOR SOLUTION INTRAMUSCULAR; INTRAVENOUS at 05:11

## 2024-11-24 RX ADMIN — LEVETIRACETAM 1000 MG: 100 INJECTION INTRAVENOUS at 08:11

## 2024-11-24 RX ADMIN — HEPARIN SODIUM 5000 UNITS: 5000 INJECTION INTRAVENOUS; SUBCUTANEOUS at 09:11

## 2024-11-24 RX ADMIN — HEPARIN SODIUM 5000 UNITS: 5000 INJECTION INTRAVENOUS; SUBCUTANEOUS at 01:11

## 2024-11-24 RX ADMIN — SODIUM CHLORIDE 500 ML: 9 INJECTION, SOLUTION INTRAVENOUS at 06:11

## 2024-11-24 RX ADMIN — MUPIROCIN: 20 OINTMENT TOPICAL at 09:11

## 2024-11-24 RX ADMIN — HEPARIN SODIUM 5000 UNITS: 5000 INJECTION INTRAVENOUS; SUBCUTANEOUS at 05:11

## 2024-11-24 RX ADMIN — LEVETIRACETAM 1000 MG: 100 INJECTION INTRAVENOUS at 09:11

## 2024-11-24 RX ADMIN — POTASSIUM & SODIUM PHOSPHATES POWDER PACK 280-160-250 MG 2 PACKET: 280-160-250 PACK at 01:11

## 2024-11-24 RX ADMIN — CEFAZOLIN 2 G: 2 INJECTION, POWDER, FOR SOLUTION INTRAMUSCULAR; INTRAVENOUS at 12:11

## 2024-11-24 RX ADMIN — POTASSIUM & SODIUM PHOSPHATES POWDER PACK 280-160-250 MG 2 PACKET: 280-160-250 PACK at 05:11

## 2024-11-24 RX ADMIN — SODIUM CHLORIDE: 9 INJECTION, SOLUTION INTRAVENOUS at 09:11

## 2024-11-24 RX ADMIN — SODIUM CHLORIDE: 9 INJECTION, SOLUTION INTRAVENOUS at 02:11

## 2024-11-24 RX ADMIN — ATORVASTATIN CALCIUM 20 MG: 20 TABLET, FILM COATED ORAL at 08:11

## 2024-11-24 RX ADMIN — POTASSIUM BICARBONATE 50 MEQ: 978 TABLET, EFFERVESCENT ORAL at 01:11

## 2024-11-24 NOTE — PLAN OF CARE
"[facility] Care Plan  POC reviewed with Ca Coats and family at 1400. Family verbalized understanding. Questions and concerns addressed. No acute events today. Pt progressing toward goals. Will continue to monitor. See below and flowsheets for full assessment and VS info.     - SBP goal changed to < 180, MAP goal of   - Tube Feeds @ Goal 50 cc/hr  - Cardene gtt d/c  - L pupil intermittently sluggish    Is this a stroke patient? no    Neuro:  [place] Coma Scale  Best Eye Response: 4-->(E4) spontaneous  Best Motor Response: 6-->(M6) obeys commands  Best Verbal Response: 2-->(V2) incomprehensible speech  [place] Coma Scale Score: 12  Assessment Qualifiers: patient not sedated/intubated  Pupil PERRLA: no     24 hr Temp:  [97.9 °F (36.6 °C)-99 °F (37.2 °C)]     CV:   Rhythm: normal sinus rhythm  BP goals:   SBP < 180  MAP >      Resp:    2 L NC       Plan: N/A    GI/:     Diet/Nutrition Received: tube feeding  Last Bowel Movement: 11/23/24  Voiding Characteristics: external catheter    Intake/Output Summary (Last 24 hours) at 11/24/2024 1618  Last data filed at 11/24/2024 1502  Gross per 24 hour   Intake 2450.98 ml   Output 4370 ml   Net -1919.02 ml     Unmeasured Output  Urine Occurrence: 1  Stool Occurrence: 2  Pad Count: 1    Labs/Accuchecks:  Recent Labs   Lab 11/24/24  0051   WBC 15.12*   RBC 2.65*   HGB 8.5*   HCT 26.8*         Recent Labs   Lab 11/24/24  0051   *   K 3.7   CO2 21*   *   BUN 11   CREATININE 0.7   ALKPHOS 79   ALT 12   AST 33   BILITOT 0.5      Recent Labs   Lab 11/21/24  0904   INR 1.0   APTT 25.4    No results for input(s): "CPK", "CPKMB", "TROPONINI", "MB" in the last 168 hours.    Electrolytes: Electrolytes replaced  Accuchecks: none    Gtts:   0.9% NaCl   Intravenous Continuous 50 mL/hr at 11/24/24 1502 Rate Verify at 11/24/24 1502       LDA/Wounds:    Domenico Risk Assessment  Sensory Perception: 3-->slightly limited  Moisture: 3-->occasionally " moist  Activity: 1-->bedfast  Mobility: 2-->very limited  Nutrition: 2-->probably inadequate  Friction and Shear: 2-->potential problem  Domenico Score: 13    Is your domenico score 12 or less? no            Restraints: L & R wrist and mitt  Restraint Order  Length of Order: Order good for next 24 hours or when removed.  Date that the current order will : 24  Time that the current order will : 1500  Order Upon Application: Yes    [facility]

## 2024-11-24 NOTE — PLAN OF CARE
PT evaluation complete and appropriate goals established.    Problem: Physical Therapy  Goal: Physical Therapy Goal  Description: Goals to be met by: 2024     Patient will increase functional independence with mobility by performin. Supine to sit with MInimal Assistance  2. Sit to supine with Minimal Assistance  3. Sit to stand transfer with Moderate Assistance  4. Bed to chair transfer with Maximum Assistance using LRAD  5. Gait  x 5 feet with Maximum Assistance using LRAD.   6. Sitting at edge of bed x5 minutes with Stand-by Assistance  7. Lower extremity exercise program x15 reps per handout, with assistance as needed    Outcome: Progressing     2024

## 2024-11-24 NOTE — SUBJECTIVE & OBJECTIVE
Interval History: NAEON. Pt exam stable this morning. Pts mentation improved. Now responding to simple questions and making sense.     Medications:  Continuous Infusions:   0.9% NaCl   Intravenous Continuous 50 mL/hr at 11/24/24 1202 Rate Verify at 11/24/24 1202    nicardipine  0-15 mg/hr Intravenous Continuous   Stopped at 11/24/24 1030     Scheduled Meds:   atorvastatin  20 mg Per NG tube Daily    ceFAZolin (Ancef) IV (PEDS and ADULTS)  2 g Intravenous Q8H    heparin (porcine)  5,000 Units Subcutaneous Q8H    levETIRAcetam (Keppra) IV (PEDS and ADULTS)  1,000 mg Intravenous Q12H    mupirocin   Nasal BID     PRN Meds:  Current Facility-Administered Medications:     acetaminophen, 650 mg, Per NG tube, Q4H PRN    albuterol-ipratropium, 3 mL, Nebulization, Q6H PRN    bisacodyL, 10 mg, Rectal, Daily PRN    hydrALAZINE, 10 mg, Intravenous, Q6H PRN    magnesium oxide, 800 mg, Per NG tube, PRN    magnesium oxide, 800 mg, Per NG tube, PRN    morphine, 2 mg, Intravenous, Q6H PRN    naloxone, 0.4 mg, Intravenous, PRN    ondansetron, 4 mg, Intravenous, Q12H PRN    oxyCODONE, 5 mg, Per NG tube, Q6H PRN    potassium bicarbonate, 35 mEq, Per NG tube, PRN    potassium bicarbonate, 50 mEq, Per NG tube, PRN    potassium bicarbonate, 60 mEq, Per NG tube, PRN    potassium, sodium phosphates, 2 packet, Per NG tube, PRN    potassium, sodium phosphates, 2 packet, Per NG tube, PRN    potassium, sodium phosphates, 2 packet, Per NG tube, PRN    sodium chloride 0.9%, 10 mL, Intravenous, PRN     Review of Systems  Objective:     Weight: 123.4 kg (272 lb)  Body mass index is 43.9 kg/m².  Vital Signs (Most Recent):  Temp: 97.9 °F (36.6 °C) (11/24/24 1102)  Pulse: 73 (11/24/24 1202)  Resp: (!) 23 (11/24/24 1202)  BP: (!) 148/59 (11/24/24 1202)  SpO2: 98 % (11/24/24 1202) Vital Signs (24h Range):  Temp:  [97.7 °F (36.5 °C)-99 °F (37.2 °C)] 97.9 °F (36.6 °C)  Pulse:  [] 73  Resp:  [22-49] 23  SpO2:  [94 %-99 %] 98 %  BP: (120-159)/(55-71)  148/59  Arterial Line BP: (106-270)/() 152/54     Date 11/24/24 0700 - 11/25/24 0659   Shift 2083-9856 5541-0914 2260-9101 24 Hour Total   INTAKE   I.V.(mL/kg) 394.5(3.2)   394.5(3.2)   NG/   120   IV Piggyback 466.5   466.5   Shift Total(mL/kg) 981(8)   981(8)   OUTPUT   Urine(mL/kg/hr) 1350   1350   Shift Total(mL/kg) 1350(10.9)   1350(10.9)   Weight (kg) 123.4 123.4 123.4 123.4                            Closed/Suction Drain 11/20/24 1930 Scalp Accordion 10 Fr. (Active)   Site Description Unable to view 11/24/24 1102   Dressing Type Gauze 11/24/24 1102   Dressing Status Clean;Dry;Intact 11/24/24 1102   Dressing Intervention Integrity maintained 11/24/24 1102   Drainage Serosanguineous 11/22/24 1505   Status To bulb suction 11/24/24 1102   Output (mL) 60 mL 11/24/24 0605            NG/OG Tube 11/21/24 1800 Right nostril (Active)   Placement Check placement verified by distal tube length measurement 11/24/24 1102   Tolerance no signs/symptoms of discomfort 11/24/24 1102   Securement secured to nostril center w/ adhesive device 11/24/24 1102   Clamp Status/Tolerance unclamped 11/24/24 1102   Suction Setting/Drainage Method suction at the bedside 11/24/24 1102   Insertion Site Appearance no redness, warmth, tenderness, skin breakdown, drainage 11/24/24 1102   Drainage None 11/24/24 1102   Flush/Irrigation flushed w/;water;no resistance met 11/24/24 1102   Feeding Type continuous;by pump 11/24/24 1102   Feeding Action feeding continued 11/24/24 1102   Current Rate (mL/hr) 10 mL/hr 11/23/24 1905   Intake (mL) 50 mL 11/24/24 1002   Water Bolus (mL) 60 mL 11/22/24 1605   Formula Name Isosource 1.5 11/24/24 1102   Intake (mL) - Formula Tube Feeding 20 11/24/24 1202   Residual Amount (ml) 0 ml 11/22/24 1505            Rectal Tube 11/23/24 1200 rectal tube w/ balloon (indicate number of mLs) (Active)   Balloon Inflation Volume (mL) 45 11/24/24 1102   Reposition rectal tube advanced slightly 11/24/24 1102  "  Outcome gas expelled, stool evacuated 11/24/24 1102   Stool Color Brown 11/24/24 1102   Insertion Site Appearance no redness, warmth, tenderness, skin breakdown, drainage 11/24/24 1102   Flush/Irrigation flushed w/;water;no resistance met 11/24/24 1102   Rectal Tube Output 50 mL 11/24/24 0605       Female External Urinary Catheter w/ Suction 11/21/24 1400 (Active)   Skin no redness;no breakdown 11/24/24 1102   Tolerance no signs/symptoms of discomfort 11/24/24 1102   Suction Continuous suction at 40 mmHg 11/24/24 0702   Date of last wick change 11/23/24 11/24/24 0702   Output (mL) 650 mL 11/24/24 1002          Physical Exam         Neurosurgery Physical Exam  E4V3M6  both pupil brisk  mild R CN3 palsy  L gaze pref but crosses midline  FC x4 AG in uppers  minimal in lowers   Significant Labs:  Recent Labs   Lab 11/23/24  0117 11/24/24  0051   * 108   * 148*   K 3.5 3.7   * 118*   CO2 21* 21*   BUN 14 11   CREATININE 0.7 0.7   CALCIUM 8.5* 8.8   MG 2.1 2.3     Recent Labs   Lab 11/23/24 0117 11/24/24  0051   WBC 21.18* 15.12*   HGB 8.2* 8.5*   HCT 25.0* 26.8*    184     No results for input(s): "LABPT", "INR", "APTT" in the last 48 hours.  Microbiology Results (last 7 days)       ** No results found for the last 168 hours. **          All pertinent labs from the last 24 hours have been reviewed.    Significant Diagnostics:  I have reviewed all pertinent imaging results/findings within the past 24 hours.  "

## 2024-11-24 NOTE — PLAN OF CARE
"[facility] Care Plan    POC reviewed with Ca Coats at 0300. Patient unable to verbalize understanding. Questions and concerns addressed. No acute events today. Pt progressing toward goals. Will continue to monitor. See below and flowsheets for full assessment and VS info.     -Cardene at 2.5 mg/hr  -K and phos replaced  -500 NS bolus given x1      Is this a stroke patient? yes- Stroke booklet reviewed with patient and family, risk factors identified for patient and stroke booklet remains at bedside for ongoing education.     Care individualization: switched bed to immerse bed     Neuro:  [place] Coma Scale  Best Eye Response: 4-->(E4) spontaneous  Best Motor Response: 6-->(M6) obeys commands  Best Verbal Response: 3-->(V3) inappropriate words  Iker Coma Scale Score: 13  Assessment Qualifiers: patient not sedated/intubated  Pupil PERRLA: yes     24 hr Temp:  [97.7 °F (36.5 °C)-98.6 °F (37 °C)]     CV:   Rhythm: normal sinus rhythm  BP goals:   SBP < 160  MAP >      Resp:    2LNC       Plan: N/A    GI/:     Diet/Nutrition Received: tube feeding  Last Bowel Movement: 11/23/24  Voiding Characteristics: external catheter    Intake/Output Summary (Last 24 hours) at 11/24/2024 0646  Last data filed at 11/24/2024 0605  Gross per 24 hour   Intake 2478.58 ml   Output 2270 ml   Net 208.58 ml     Unmeasured Output  Urine Occurrence: 1  Stool Occurrence: 2  Pad Count: 1    Labs/Accuchecks:  Recent Labs   Lab 11/24/24  0051   WBC 15.12*   RBC 2.65*   HGB 8.5*   HCT 26.8*         Recent Labs   Lab 11/24/24  0051   *   K 3.7   CO2 21*   *   BUN 11   CREATININE 0.7   ALKPHOS 79   ALT 12   AST 33   BILITOT 0.5      Recent Labs   Lab 11/21/24  0904   INR 1.0   APTT 25.4    No results for input(s): "CPK", "CPKMB", "TROPONINI", "MB" in the last 168 hours.    Electrolytes: Electrolytes replaced  Accuchecks: none    Gtts:   0.9% NaCl   Intravenous Continuous 50 mL/hr at 11/24/24 0605 Rate Verify at " 24    nicardipine  0-15 mg/hr Intravenous Continuous 12.5 mL/hr at 24 2.5 mg/hr at 24       LDA/Wounds:    Domenico Risk Assessment  Sensory Perception: 3-->slightly limited  Moisture: 3-->occasionally moist  Activity: 1-->bedfast  Mobility: 2-->very limited  Nutrition: 2-->probably inadequate  Friction and Shear: 2-->potential problem  Domenico Score: 13  Is your domenico score 12 or less? no          Restraints:   Restraint Order  Length of Order: Order good for next 24 hours or when removed.  Date that the current order will : 24  Time that the current order will : 1500  Order Upon Application: Yes    [facility]

## 2024-11-24 NOTE — PROGRESS NOTES
[facility] - Neuro Critical Care  Neurocritical Care  Progress Note    Admit Date: 11/20/2024  Service Date: 11/23/2024  Length of Stay: 3    Subjective:     Chief Complaint: Cerebral aneurysm    History of Present Illness: Isabel Coats is a 73-year-old female past medical history of hypertension, obstructive sleep apnea on CPAP, suggestive heart failure, obesity, history of gastric cancer status post chemoradiation and DVT in currently on Eliquis. She presents to [facility] L pterional crani, 7 clips applied in proximity to large irregular L MCA bifurcation aneurysm and other adjacent small aneurysms, 1 clip applied across neck of aneurysm just proximal to anterior choroidal.  She also has a current smoker she was initially referred to Neurology for workup for dizziness. She had been complaining of vertigo multiple times a day for the past several months, associated with shortness of breath, palpitation, nausea, headache, and feeling faint. She is admitted to [facility] for a higher level of care.     Hospital Course: 11/21/2024 noted to have intermittentleft facial twitching and decreased mental status.   11/22/2024 left gaze preference. CT perfusion with decreased flow left insula  11/23/2024 frequent short runs of Vtach while on milrinone. Neuro exam stable, without major improvement. MRI yesterday with edema in the L anterior temporal lobe and L posterior inferior frontal lobe with stable IPH of the frontal lobe and R superior cerebellum. After discussion with NSGY, plan to liberalize MAP goal to 100    Interval History:  see hospital course    Review of Systems   Unable to perform ROS: Mental status change     Objective:     Vitals:  Temp: 98.1 °F (36.7 °C)  Pulse: 85  Rhythm: normal sinus rhythm  BP: (!) 127/56  MAP (mmHg): 85  Resp: (!) 25  SpO2: 96 %    Temp  Min: 98.1 °F (36.7 °C)  Max: 98.8 °F (37.1 °C)  Pulse  Min: 85  Max: 131  BP  Min: 98/49  Max: 154/67  MAP (mmHg)  Min: 70  Max: 98  Resp  Min: 23  Max: 40  SpO2   Min: 91 %  Max: 98 %    11/22 0701 - 11/23 0700  In: 2670.6 [I.V.:2140.6]  Out: 1392 [Urine:1300; Drains:92]   Unmeasured Output  Urine Occurrence: 1  Stool Occurrence: 2  Pad Count: 1          Physical Exam:  GA: drowsy, comfortable, no acute distress.   HEENT: No scleral icterus or JVD. Neck supple  Pulmonary: Air entry equal to auscultation A/P/L. Wheezing no, crackles no  Cardiac: RRR, S1 & S2 w/o rubs/murmurs/gallops.   Abdominal: soft, non-tender, bowel sounds present x 4. No appreciable hepatosplenomegaly.  Skin: No jaundice, rashes, or visible lesions.  Neuro:  --GCS: 10  --Mental Status:  lethargic, follows  simple commands. Some appropriate words, but largely incomprehensible  --Pupils 3.5 mm  --Left gaze preference  --Right HH  --Corneal reflex not done in this arousable patient, gag, cough intact.  Moves bilateral UE and LE symmetrically  MSK:  No edema in UE and LE    Medications:  Continuous0.9% NaCl, Last Rate: 50 mL/hr at 11/23/24 1302  nicardipine, Last Rate: 15 mg/hr (11/23/24 1319)    ScheduledceFAZolin (Ancef) IV (PEDS and ADULTS), 2 g, Q8H  heparin (porcine), 5,000 Units, Q8H  levETIRAcetam (Keppra) IV (PEDS and ADULTS), 1,000 mg, Q12H  mupirocin, , BID  polyethylene glycol, 17 g, Daily  senna-docusate 8.6-50 mg, 1 tablet, Daily    PRNacetaminophen, 650 mg, Q4H PRN  bisacodyL, 10 mg, Daily PRN  hydrALAZINE, 10 mg, Q6H PRN  magnesium oxide, 800 mg, PRN  magnesium oxide, 800 mg, PRN  morphine, 2 mg, Q6H PRN  naloxone, 0.4 mg, PRN  ondansetron, 4 mg, Q12H PRN  oxyCODONE, 5 mg, Q6H PRN  potassium bicarbonate, 35 mEq, PRN  potassium bicarbonate, 50 mEq, PRN  potassium bicarbonate, 60 mEq, PRN  potassium, sodium phosphates, 2 packet, PRN  potassium, sodium phosphates, 2 packet, PRN  potassium, sodium phosphates, 2 packet, PRN  sodium chloride 0.9%, 10 mL, PRN      Today I personally reviewed pertinent medications, lines/drains/airways, imaging, cardiology results, laboratory results, microbiology  results, notably:    Diet  Diet NPO  Diet NPO    MRI Brain without 11/22/24  -Evolving postsurgical change from recent left MCA and ICA aneurysm clipping.  There is some parenchymal edema adjacent to the surgical site involving the left anterior temporal lobe and posterior inferior frontal lobe with small focal intraparenchymal hemorrhage in the frontal lobe.  -Right superior cerebellar hemorrhage with localized edema, grossly unchanged from recent exams.  -Small volume subarachnoid and intraventricular hemorrhage elsewhere, unchanged.    Assessment/Plan:     Neuro  * Cerebral aneurysm  Ms. 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 CTA was negative for new occlusion, EEG was negative for epileptiform activity, and MRI revealed edema in the L anterior temporal lobe and posterior inferior frontal lobes but without changes that would explain patient's clinical exam. Has been maintained on milrinone/nicardipine to maintain SBP<120, MAP>65. After discussion with NSGY staff, we will liberalize MAP goal and follow neuro exam to monitor for improvement with higher systolic pressures.    - Admit to NCC   - s/p L pterional crani, 7 clips applied in proximity to large irregular L MCA bifurcation aneurysm and other adjacent small aneurysms, 1 clip applied across neck of aneurysm just proximal to anterior choroidal.   - Na goal 140-150  - MAP goal   - Q 1h vitals  - Q 1h neuro checks  - Keppra 1g BID  - s/p dex 4 q 6 x 3 doses  - post-op ancef x5d       Cardiac/Vascular  Pure hypercholesterolemia  - restart home lipitor 20    Chronic diastolic congestive heart failure  STABLE  Appears to be euvolemic and maintains hemodynamic stability.      Left Ventricle: The left ventricle is normal in size. Normal wall thickness. There is normal systolic  function with a visually estimated ejection fraction of 60 - 65%. There is normal diastolic function.    Right Ventricle: Normal right ventricular cavity size. Wall thickness is normal. Systolic function is normal.    Left Atrium: Left atrium is severely dilated.    Right Atrium: Right atrium is dilated.    Aortic Valve: The aortic valve is a trileaflet valve. There is moderate aortic valve sclerosis. Mildly restricted motion.    Tricuspid Valve: There is mild regurgitation.    IVC/SVC: Normal venous pressure at 3 mmHg.    Essential hypertension  - SBP <120  - Nicardipine gtt and prn hydralazine     - Echo:   Left Ventricle: The left ventricle is normal in size. Normal wall thickness. There is normal systolic function with a visually estimated ejection fraction of 60 - 65%. There is normal diastolic function.    Right Ventricle: Normal right ventricular cavity size. Wall thickness is normal. Systolic function is normal.    Left Atrium: Left atrium is severely dilated.    Right Atrium: Right atrium is dilated.    Aortic Valve: The aortic valve is a trileaflet valve. There is moderate aortic valve sclerosis. Mildly restricted motion.    Tricuspid Valve: There is mild regurgitation.    IVC/SVC: Normal venous pressure at 3 mmHg.    Other  Debility  - PT/OT eval and treat           The patient is being Prophylaxed for:  Venous Thromboembolism with: Chemical  Stress Ulcer with: Not Applicable   Ventilator Pneumonia with: not applicable    Activity Orders            Elevate HOB Elevate (30-45 degrees) Elevate HOB to 30 - 45 degrees during feeding unless otherwise stated starting at 11/22 1620    Turn patient starting at 11/20 2200    Elevate HOB starting at 11/20 2112    Diet NPO: NPO starting at 11/20 2112          Full Code    Saul Peralta MD  Neurocritical Care  [facility] - Neuro Critical Care

## 2024-11-24 NOTE — PLAN OF CARE
OT eval complete. OT POC and goals established.   Problem: Occupational Therapy  Goal: Occupational Therapy Goal  Description: Goals to be met by: 12/24/24     Patient will increase functional independence with ADLs by performing:    UE Dressing with Stand-by Assistance.  LE Dressing with Minimal Assistance.  Grooming while seated with Stand-by Assistance.  Toileting from toilet/bedside commode with Minimal Assistance for hygiene and clothing management.   Supine to sit with Minimal Assistance.  Toilet transfer to toilet/bedside commode with Minimal Assistance.    Patient requires a hospital bed for positioning of the body in ways that are not feasible with an ordinary bed. The patient requires special positioning for pain relief, limited mobility, and/or being unable to independently make changes in body position without the use of a hospital bed. Pillows and wedges will not be adequate for resolving these positional issues.      Patient has a mobility limitation that significantly impairs their ability to participate in one or more mobility related activities of daily living in customary locations in the home. The mobility limitation cannot be sufficiently resolved by the use of a cane or walker. The use of a manual wheelchair will greatly improve the patient's ability to participate in MRADLs. The patient will use the wheelchair on a regular basis at home. They have expressed their willingness to use a manual wheelchair in the home, and have a caregiver who is available and willing to assist with the wheelchair if needed.       Outcome: Progressing

## 2024-11-24 NOTE — PT/OT/SLP EVAL
Occupational Therapy   Co-Evaluation  Co-treatment performed due to patient's multiple deficits requiring two skilled therapists to appropriately and safely assess patient's strength and endurance while facilitating functional tasks in addition to accommodating for patient's activity tolerance.      Name: Ca Coats  MRN: [id number]  Admitting Diagnosis: Cerebral aneurysm  Recent Surgery: Procedure(s) (LRB):  CRANIOTOMY, WITH ANEURYSM CLIPPING W/ STEALTH (Left)  CRANIOTOMY, FOR ANEURYSM OF VERTEBROBASILAR OR CAROTID CIRCULATION (Left)  DISSECTION, NERVE, USING OPERATING MICROSCOPE (Left) 4 Days Post-Op    Recommendations:     Discharge Recommendations: Moderate Intensity Therapy  Discharge Equipment Recommendations:  hospital bed, lift device, wheelchair  Barriers to discharge:  Other (Comment) (increased skilled (A) required)    Assessment:     Ca Coats is a 73 y.o. female with a medical diagnosis of Cerebral aneurysm.  She presents with the following performance deficits affecting function: weakness, impaired endurance, impaired self care skills, impaired functional mobility, gait instability, impaired balance, impaired cognition, decreased coordination, decreased upper extremity function, decreased lower extremity function, decreased safety awareness, impaired coordination, impaired cardiopulmonary response to activity. Pt lethargic throughout session, but arousable. BP at EOB reading 199/104 mmHg and in supine 177/103 mmHg. RN notified. Pt would continue to benefit from skilled OT services to maximize functional independence with ADLs and functional mobility, reduce caregiver burden, and facilitate safe discharge in the least restrictive environment. Patient continues to demonstrate the need for moderate intensity therapy on a daily basis post acute exhibited by decreased independence with self-care and functional mobility     Rehab Prognosis: Good; patient would benefit from acute  skilled OT services to address these deficits and reach maximum level of function.       Plan:     Patient to be seen 4 x/week to address the above listed problems via self-care/home management, therapeutic activities, therapeutic exercises, neuromuscular re-education  Plan of Care Expires: 12/24/24  Plan of Care Reviewed with: patient    Subjective     Chief Complaint: none stated  Patient/Family Comments/goals: none stated    Occupational Profile:  Living Environment: Pt lives alone in a 1st floor apartment with 1 ANN. T/s combo  Previous level of function: Mod (I) with ADLs and fx'l mobility using RW  Roles and Routines:   Equipment Used at Home: bath bench, bedside commode, walker, rolling  Assistance upon Discharge: family    Pain/Comfort:  Pain Rating 1: 0/10  Pain Rating Post-Intervention 1: 0/10    Patients cultural, spiritual, Shinto conflicts given the current situation: no    Objective:   Additional staff present:  Ivette PT    Communicated with: RN prior to session.  Patient found HOB elevated with arterial line, blood pressure cuff, bowel management system, hemovac, NG tube, oxygen, peripheral IV, pulse ox (continuous), restraints, telemetry, Other (comments) (OFELIA waters) upon OT entry to room.    General Precautions: Standard, fall, aspiration, aphasia  Orthopedic Precautions: N/A  Braces: N/A  Respiratory Status: Nasal cannula, flow 2 L/min    Occupational Performance:    Bed Mobility:    Patient completed Scooting/Bridging with maximal assistance and 2 persons  Patient completed Supine to Sit with maximal assistance and 2 persons  Patient completed Sit to Supine with total assistance and 2 persons    Functional Mobility/Transfers:  Pt tolerated sitting EOB for ~2 minutes with Min-CGA  Limited by hypertension, returned to supine    Activities of Daily Living:  Upper Body Dressing: maximal assistance to don hospital gown EOB  Lower Body Dressing: total assistance required to don hospital socks  at bed level    Cognitive/Visual Perceptual:  Cognitive/Psychosocial Skills:     -       Oriented to: Person   -       Follows Commands/attention:Follows one-step commands 75%  -       Communication: able to answer yes/no questions ~10% and state name  -       Safety awareness/insight to disability: impaired   -       Mood/Affect/Coping skills/emotional control: Lethargic    Physical Exam:  Upper Extremity Range of Motion:     -       Right Upper Extremity: unable to formally assess  -       Left Upper Extremity: unable to formally assess  Upper Extremity Strength:    -       Right Upper Extremity: unable to formally assess  -       Left Upper Extremity: unable to formally assess    AMPAC 6 Click ADL:  AMPAC Total Score: 8    Treatment & Education:  Provided education regarding role of OT, POC, & discharge recommendations with pt verbalizing understanding.  Pt had no further questions & when asked whether there were any concerns pt reported none.     Patient left HOB elevated with all lines intact, call button in reach, and RN notified    GOALS:   Multidisciplinary Problems       Occupational Therapy Goals          Problem: Occupational Therapy    Goal Priority Disciplines Outcome Interventions   Occupational Therapy Goal     OT, PT/OT Progressing    Description: Goals to be met by: 12/24/24     Patient will increase functional independence with ADLs by performing:    UE Dressing with Stand-by Assistance.  LE Dressing with Minimal Assistance.  Grooming while seated with Stand-by Assistance.  Toileting from toilet/bedside commode with Minimal Assistance for hygiene and clothing management.   Supine to sit with Minimal Assistance.  Toilet transfer to toilet/bedside commode with Minimal Assistance.    Patient requires a hospital bed for positioning of the body in ways that are not feasible with an ordinary bed. The patient requires special positioning for pain relief, limited mobility, and/or being unable to  independently make changes in body position without the use of a hospital bed. Pillows and wedges will not be adequate for resolving these positional issues.      Patient has a mobility limitation that significantly impairs their ability to participate in one or more mobility related activities of daily living in customary locations in the home. The mobility limitation cannot be sufficiently resolved by the use of a cane or walker. The use of a manual wheelchair will greatly improve the patient's ability to participate in MRADLs. The patient will use the wheelchair on a regular basis at home. They have expressed their willingness to use a manual wheelchair in the home, and have a caregiver who is available and willing to assist with the wheelchair if needed.                            History:     Past Medical History:   Diagnosis Date    YOUNG (acute kidney injury) 10/15/2021    Anemia     2018    Arthritis     BMI 60.0-69.9, adult     Cataract     Chronic diastolic congestive heart failure 01/27/2021    Coronary artery disease involving native coronary artery of native heart without angina pectoris 11/15/2024    Deep vein thrombosis     Depression     Dry eye syndrome     DVT of deep femoral vein, right 05/29/2023    Gastric adenocarcinoma 05/25/2021    GERD (gastroesophageal reflux disease)     Glaucoma suspect     History of gastric ulcer     History of psychiatric hospitalization     Hx of psychiatric care     Celexa, no recall of charted Effexor, Lexapro, Desyrel prescriptions    Hyperlipidemia     Hypertension     Hypothyroidism     Morbid obesity     Neuromuscular disorder     Sleep apnea     Thyroid disease          Past Surgical History:   Procedure Laterality Date    APPENDECTOMY      CATARACT EXTRACTION W/  INTRAOCULAR LENS IMPLANT Bilateral     MFIOL OU    CLIP LIGATION OF INTRACRANIAL ANEURYSM BY CRANIOTOMY Left 11/20/2024    Procedure: CRANIOTOMY, WITH ANEURYSM CLIPPING W/ STEALTH;  Surgeon: Alfred  Sejal FIERRO MD;  [facility];  Service: Neurosurgery;  Laterality: Left;  left pterioral craniotomy with microsurgical clip ligation of mca and anterior chroidal aneurysm, dr. ginna hopkins co surgeon, scalp block, type and cross 2 units, neuromonitoring sep,mep,eeg, regular bed, desouza, supine spencer, icu pos    CORONARY ANGIOGRAPHY Bilateral 02/24/2021    Procedure: ANGIOGRAM, CORONARY ARTERY;  Surgeon: Cresencio Ridley MD;  Location: [facility] CATH LAB;  Service: Cardiology;  Laterality: Bilateral;  Covid test needed - ok per Danay SSCU. Pt. w/o transportation or family    CRANIOTOMY, FOR ANEURYSM OF VERTEBROBASILAR OR CAROTID CIRCULATION Left 11/20/2024    Procedure: CRANIOTOMY, FOR ANEURYSM OF VERTEBROBASILAR OR CAROTID CIRCULATION;  Surgeon: Sejal Simon MD;  [facility];  Service: Neurosurgery;  Laterality: Left;  with scalp block    DISSECTION, NERVE, USING OPERATING MICROSCOPE Left 11/20/2024    Procedure: DISSECTION, NERVE, USING OPERATING MICROSCOPE;  Surgeon: Sejal Simon MD;  Location: [facility] OR [address] [place] [address];  Service: Neurosurgery;  Laterality: Left;  with scalp block    ENDOSCOPIC ULTRASOUND OF UPPER GASTROINTESTINAL TRACT N/A 03/17/2021    Procedure: ULTRASOUND, UPPER GI TRACT, ENDOSCOPIC;  Surgeon: Gerald Anaya MD;  Location: [facility] ([address] [place] [address]);  Service: Endoscopy;  Laterality: N/A;  Pt unable to get a ride for swab. Will do rapid test at 8:30 - ttr    ENDOSCOPIC ULTRASOUND OF UPPER GASTROINTESTINAL TRACT N/A 01/13/2022    Procedure: ULTRASOUND, UPPER GI TRACT, ENDOSCOPIC;  Surgeon: Kevin Carballo MD;  Location: [facility] ENDO ([address] [place] [address]);  Service: Endoscopy;  Laterality: N/A;  blood thinner approval received, see telephone encounter 1/7/22-BB  rapid  instructions given verbally and sent to address on file in pt's chart-BB    ENDOSCOPIC ULTRASOUND OF UPPER GASTROINTESTINAL TRACT N/A 10/11/2022    Procedure: ULTRASOUND, UPPER GI TRACT, ENDOSCOPIC;  Surgeon: Dequan  TARIQ Ridley MD;  Location: [facility] (2ND FLR);  Service: Endoscopy;  Laterality: N/A;    ENDOSCOPIC ULTRASOUND OF UPPER GASTROINTESTINAL TRACT N/A 01/24/2024    Procedure: ULTRASOUND, UPPER GI TRACT, ENDOSCOPIC;  Surgeon: Kevin Carballo MD;  Location: [facility] ENDO (2ND FLR);  Service: Endoscopy;  Laterality: N/A;    ENDOSCOPIC ULTRASOUND OF UPPER GASTROINTESTINAL TRACT N/A 03/05/2024    Procedure: ULTRASOUND, UPPER GI TRACT, ENDOSCOPIC;  Surgeon: Kevin Carballo MD;  Location: [facility] ENDO (2ND FLR);  Service: Endoscopy;  Laterality: N/A;  1/25 portal instr-Repeat theEUS at the next available appointment because the preparation was poor. 48hrs of liquid. Ozempic 7 day hold-eliquis approved Dr. Pelaez TE 12/12/2023-tt  2/28-precall complete-pt verbalized udnerstanding of holding Oz    ESOPHAGOGASTRODUODENOSCOPY N/A 02/05/2021    Procedure: EGD (ESOPHAGOGASTRODUODENOSCOPY);  Surgeon: Matthew Henradez MD;  Location: [facility] (2ND FLR);  Service: Endoscopy;  Laterality: N/A;  BMI-58    Wt: 361#     COVID test at [facility] on 2/2-GT    ESOPHAGOGASTRODUODENOSCOPY N/A 03/17/2021    Procedure: EGD (ESOPHAGOGASTRODUODENOSCOPY);  Surgeon: Gerald Anaya MD;  Location: [facility] (2ND FLR);  Service: Endoscopy;  Laterality: N/A;    ESOPHAGOGASTRODUODENOSCOPY N/A 05/25/2021    Procedure: EGD (ESOPHAGOGASTRODUODENOSCOPY);  Surgeon: Kevin Carballo MD;  Location: [facility] ENDO (2ND FLR);  Service: Endoscopy;  Laterality: N/A;  ESD 2 hours  Full COVID vaccination on file. ttr    ESOPHAGOGASTRODUODENOSCOPY N/A 01/13/2022    Procedure: EGD (ESOPHAGOGASTRODUODENOSCOPY);  Surgeon: Kevin Carballo MD;  Location: [facility] ENDO (2ND FLR);  Service: Endoscopy;  Laterality: N/A;  blood thinner approval, see telephone encounter 1/7/22-BB  rapid  instructions given verbally and sent to address on file in pt's chart-BB    ESOPHAGOGASTRODUODENOSCOPY N/A 10/11/2022    Procedure: EGD (ESOPHAGOGASTRODUODENOSCOPY);  Surgeon: Dequan Ridley MD;   Location: [facility] ENDO (2ND FLR);  Service: Endoscopy;  Laterality: N/A;  She is do for EGD/EUS now. Personal history of gastric cancer. Ca Coats #[id number].   Thanks,   Kevin Fuentes MD    Pt is fully vaccinated-DS  9/14/22-Approval to hold Eliquis rec'd from Dr. Pelaez (see telephone encounter 9/14/22)-DS  9/14/22-Ins    ESOPHAGOGASTRODUODENOSCOPY N/A 01/24/2024    Procedure: EGD (ESOPHAGOGASTRODUODENOSCOPY);  Surgeon: Kevin Carballo MD;  Location: [facility] ENDO (2ND FLR);  Service: Endoscopy;  Laterality: N/A;  12/8/23: instructions sent via portal. eliquis approval from MD Pelaez in telephone encounter (12/12/23) -GD  1/9-precall complete-MS    ESOPHAGOGASTRODUODENOSCOPY N/A 03/05/2024    Procedure: EGD (ESOPHAGOGASTRODUODENOSCOPY);  Surgeon: Kevin Carballo MD;  Location: [facility] ENDO (2ND FLR);  Service: Endoscopy;  Laterality: N/A;    EYE SURGERY      HYSTEROSCOPIC POLYPECTOMY OF UTERUS  01/10/2024    Procedure: POLYPECTOMY, UTERUS, HYSTEROSCOPIC;  Surgeon: Pina Pickens MD;  Location: [facility] OR;  Service: OB/GYN;;    HYSTEROSCOPY WITH DILATION AND CURETTAGE OF UTERUS N/A 01/10/2024    Procedure: HYSTEROSCOPY, WITH DILATION AND CURETTAGE OF UTERUS;  Surgeon: Pina Pickens MD;  Location: [facility] OR;  Service: OB/GYN;  Laterality: N/A;    INJECTION OF ANESTHETIC AGENT AROUND NERVE Left 07/10/2018    Procedure: BLOCK, NERVE;  Surgeon: Samantha Vidal MD;  Location: [facility] PAIN MGT;  Service: Pain Management;  Laterality: Left;  Genicular     INJECTION OF ANESTHETIC AGENT AROUND NERVE Left 07/19/2019    Procedure: Block, Nerve NERVE BLOCK GENICULAR WITH PHENOL 6%;  Surgeon: Samantha Vidal MD;  Location: [facility] PAIN MGT;  Service: Pain Management;  Laterality: Left;  NEEDS CONSENT    INSERTION OF TUNNELED CENTRAL VENOUS CATHETER (CVC) WITH SUBCUTANEOUS PORT N/A 07/09/2021    Procedure: INSERTION, PORT-A-CATH;  Surgeon: Mario Paz MD;  Location: [facility] CATH LAB;  Service: Radiology;  Laterality:  N/A;  PORT PLACEMENT    RADIOFREQUENCY ABLATION Left 06/19/2020    Procedure: RADIOFREQUENCY ABLATION LEFT GENICULAR;  Surgeon: Tevin Clark MD;  Location: [facility] PAIN MGT;  Service: Pain Management;  Laterality: Left;  Left Genicular RFA    RADIOFREQUENCY ABLATION Left 01/22/2021    Procedure: RADIOFREQUENCY ABLATION LEFT GENICULAR;  Surgeon: Tevin Clark MD;  Location: [facility] PAIN MGT;  Service: Pain Management;  Laterality: Left;    RADIOFREQUENCY ABLATION Left 07/30/2021    Procedure: RADIOFREQUENCY ABLATION, GENICULAR COOLED NEED CONSENT;  Surgeon: Tevin Clark MD;  Location: [facility] PAIN MGT;  Service: Pain Management;  Laterality: Left;    RADIOFREQUENCY ABLATION Left 04/22/2022    Procedure: RADIOFREQUENCY ABLATION, GENICULAR COOLED , LEFT;  Surgeon: Tevin Clark MD;  Location: [facility] PAIN MGT;  Service: Pain Management;  Laterality: Left;    RADIOFREQUENCY ABLATION Left 12/23/2022    Procedure: RADIOFREQUENCY ABLATION LEFT GENICULAR COOLED CLEARED TO HOLD ELIQUIS 3 DAYS  NEEDS CONSENT;  Surgeon: Tevin Clark MD;  Location: [facility] PAIN MGT;  Service: Pain Management;  Laterality: Left;    TONSILLECTOMY         Time Tracking:     OT Date of Treatment: 11/24/24  OT Start Time: 1309  OT Stop Time: 1325  OT Total Time (min): 16 min    Billable Minutes:Evaluation 8  Self Care/Home Management 8    11/24/2024

## 2024-11-24 NOTE — ASSESSMENT & PLAN NOTE
Isabel Coats is a 73-year-old female past medical history of hypertension, obstructive sleep apnea on CPAP, suggestive heart failure, obesity, history of gastric cancer status post chemoradiation and DVT in currently on Eliquis. She presents to [facility] pterional crani, 7 clips applied in proximity to large irregular L MCA bifurcation aneurysm and other adjacent small aneurysms, 1 clip applied across neck of aneurysm just proximal to anterior choroidal.  She also has a current smoker she was initially referred to Neurology for workup for dizziness. She had been complaining of vertigo multiple times a day for the past several months, associated with shortness of breath, palpitation, nausea, headache, and feeling faint. She is admitted to [facility] for a higher level of care.      Imaging:  CTA Head 11/20: expected post-op changes  CTH 11/21 8 AM: right sided cerebellar IPH  CTA 11/21 10 AM: stable IPH, possible frontal contusion  CTP 11/21 4 PM: possible left anterior temporal perfusion deficit in territory of branches distal to MCA clips     Plans:  -Admitted to ICU  -q1h neurocheck  -All labs and imaging were reviewed  -Strict SBP <120, MAP >65  -Keep the drain to suction  -Continue to monitor respiratory status - ABG yesterday with normal CO2 at time of AMS  -keppra 1g BID. cEEG negative  -MRI- no strokes, expected post op changes, and stable cerebellar bleed.  -continue milrinone, currently at 0.5 - seems to have mild improvement with it started. keep euvolemic to net +. F/u TCDS per [facility]  -Pain control  -PT/OT/OOB  -Continue to monitor clinically, notify NSGY immediately with any changes in neuro status     Discussed with staff Dr. Simon

## 2024-11-24 NOTE — PROGRESS NOTES
[facility] - Neuro Critical Care  Neurocritical Care  Progress Note    Admit Date: 11/20/2024  Service Date: 11/24/2024  Length of Stay: 4    Subjective:     Chief Complaint: Cerebral aneurysm    History of Present Illness: Isabel Coats is a 73-year-old female past medical history of hypertension, obstructive sleep apnea on CPAP, suggestive heart failure, obesity, history of gastric cancer status post chemoradiation and DVT in currently on Eliquis. She presents to [facility] L pterional crani, 7 clips applied in proximity to large irregular L MCA bifurcation aneurysm and other adjacent small aneurysms, 1 clip applied across neck of aneurysm just proximal to anterior choroidal.  She also has a current smoker she was initially referred to Neurology for workup for dizziness. She had been complaining of vertigo multiple times a day for the past several months, associated with shortness of breath, palpitation, nausea, headache, and feeling faint. She is admitted to [facility] for a higher level of care.     Hospital Course: 11/21/2024 noted to have intermittentleft facial twitching and decreased mental status.   11/22/2024 left gaze preference. CT perfusion with decreased flow left insula  11/23/2024 frequent short runs of Vtach while on milrinone. Neuro exam stable, without major improvement. MRI yesterday with edema in the L anterior temporal lobe and L posterior inferior frontal lobe with stable IPH of the frontal lobe and R superior cerebellum. After discussion with NSGY, plan to liberalize MAP goal to 100  11/24/2024 NAEO. Liberalize     Review of Symptoms: encephalopathic cannot participate    Medications:  Continuous Infusions:   0.9% NaCl   Intravenous Continuous 50 mL/hr at 11/24/24 1002 Rate Verify at 11/24/24 1002    nicardipine  0-15 mg/hr Intravenous Continuous 25 mL/hr at 11/24/24 1002 5 mg/hr at 11/24/24 1002     Scheduled Meds:   atorvastatin  20 mg Per NG tube Daily    ceFAZolin (Ancef) IV (PEDS and ADULTS)  2  g Intravenous Q8H    heparin (porcine)  5,000 Units Subcutaneous Q8H    levETIRAcetam (Keppra) IV (PEDS and ADULTS)  1,000 mg Intravenous Q12H    mupirocin   Nasal BID     PRN Meds:.  Current Facility-Administered Medications:     acetaminophen, 650 mg, Per NG tube, Q4H PRN    albuterol-ipratropium, 3 mL, Nebulization, Q6H PRN    bisacodyL, 10 mg, Rectal, Daily PRN    hydrALAZINE, 10 mg, Intravenous, Q6H PRN    magnesium oxide, 800 mg, Per NG tube, PRN    magnesium oxide, 800 mg, Per NG tube, PRN    morphine, 2 mg, Intravenous, Q6H PRN    naloxone, 0.4 mg, Intravenous, PRN    ondansetron, 4 mg, Intravenous, Q12H PRN    oxyCODONE, 5 mg, Per NG tube, Q6H PRN    potassium bicarbonate, 35 mEq, Per NG tube, PRN    potassium bicarbonate, 50 mEq, Per NG tube, PRN    potassium bicarbonate, 60 mEq, Per NG tube, PRN    potassium, sodium phosphates, 2 packet, Per NG tube, PRN    potassium, sodium phosphates, 2 packet, Per NG tube, PRN    potassium, sodium phosphates, 2 packet, Per NG tube, PRN    sodium chloride 0.9%, 10 mL, Intravenous, PRN    OBJECTIVE:   Vital Signs (Most Recent):   Temp: 99 °F (37.2 °C) (11/24/24 0702)  Pulse: 77 (11/24/24 1002)  Resp: (!) 22 (11/24/24 1002)  BP: (!) 154/71 (11/24/24 1002)  SpO2: 98 % (11/24/24 1002)    Vital Signs (24h Range):   Temp:  [97.7 °F (36.5 °C)-99 °F (37.2 °C)] 99 °F (37.2 °C)  Pulse:  [] 77  Resp:  [22-49] 22  SpO2:  [94 %-98 %] 98 %  BP: (104-159)/(55-71) 154/71  Arterial Line BP: (106-270)/() 145/55    ICP/CPP (Last 24h):        I & O (Last 24h):   Intake/Output Summary (Last 24 hours) at 11/24/2024 1024  Last data filed at 11/24/2024 1002  Gross per 24 hour   Intake 2746.78 ml   Output 3870 ml   Net -1123.22 ml     Physical Exam:  GA: drowsy, comfortable, no acute distress.   HEENT: No scleral icterus or JVD. Neck supple  Pulmonary: Air entry equal to auscultation A/P/L. Wheezing no, crackles no  Cardiac: RRR, S1 & S2 w/o rubs/murmurs/gallops.   Abdominal: soft,  non-tender, bowel sounds present x 4. No appreciable hepatosplenomegaly.  Skin: No jaundice, rashes, or visible lesions.  Neuro:  --GCS: 10  --Mental Status:  lethargic, follows  simple commands.  Incomprehensible words   --Pupils 3.5 mm  Left gaze preference  Right HH  --Corneal reflex not done in this arousable patient, gag, cough intact.  Moves bilateral UE and LE symmetrically  MSK:  No edema in UE and LE    Vent Data:          Lines/Drains/Airway:          PowerPort A Cath Single Lumen 07/09/21 1135 right internal jugular (Active)      Arterial Line 11/20/24 1431 Left Radial (Active)   Site Assessment Clean;Dry;Intact;No redness;No swelling;No warmth;No drainage 11/22/24 0301   Line Status Pulsatile blood flow 11/22/24 0301   Art Line Waveform Square wave test performed 11/22/24 0301   Arterial Line Interventions Leveled;Zeroed and calibrated;Connections checked and tightened;Flushed per protocol 11/22/24 0301   Color/Movement/Sensation Capillary refill less than 3 sec 11/22/24 0301   Dressing Type Central line dressing;CHG impregnated dressing/sponge 11/22/24 0301   Dressing Status Clean;Dry;Intact 11/22/24 0301   Dressing Intervention Sterile dressing change 11/22/24 0301   Dressing Change Due 11/28/24 11/22/24 0301   Tubing Change Due 11/24/24 11/21/24 1901           Closed/Suction Drain 11/20/24 1930 Scalp Accordion 10 Fr. (Active)   Site Description Unable to view 11/22/24 0301   Dressing Type Gauze 11/22/24 0301   Dressing Status Old drainage 11/22/24 0301   Dressing Intervention Integrity maintained 11/22/24 0301   Drainage Bloody;Serosanguineous 11/22/24 0301   Status Other (Comment) 11/22/24 0301   Output (mL) 0 mL 11/22/24 0705            NG/OG Tube 11/21/24 1800 Right nostril (Active)   Intake (mL) 60 mL 11/22/24 0805   Water Bolus (mL) 30 mL 11/22/24 0805   Intake (mL) - Formula Tube Feeding 0 11/22/24 1005       Female External Urinary Catheter w/ Suction 11/21/24 1400 (Active)   Skin perineum  "cleansed w/ soap and water;female external urine collection device repositioned 11/22/24 0301   Tolerance no signs/symptoms of discomfort 11/22/24 0301   Suction Continuous suction at 60 mmHg 11/21/24 1901   Date of last wick change 11/21/24 11/21/24 1901   Output (mL) 50 mL 11/22/24 0905     Nutrition/Tube Feeds (if NPO state why): NPO     Labs:  ABG:   No results for input(s): "PH", "PO2", "PCO2", "HCO3", "POCSATURATED", "BE" in the last 24 hours.    BMP:  Recent Labs   Lab 11/24/24  0051   *   K 3.7   *   CO2 21*   BUN 11   CREATININE 0.7      MG 2.3   PHOS 2.3*     LFT:   Lab Results   Component Value Date    AST 33 11/24/2024    ALT 12 11/24/2024    ALKPHOS 79 11/24/2024    BILITOT 0.5 11/24/2024    ALBUMIN 2.9 (L) 11/24/2024    PROT 6.0 11/24/2024     CBC:   Lab Results   Component Value Date    WBC 15.12 (H) 11/24/2024    HGB 8.5 (L) 11/24/2024    HCT 26.8 (L) 11/24/2024     (H) 11/24/2024     11/24/2024     Microbiology x 7d:   Microbiology Results (last 7 days)       ** No results found for the last 168 hours. **          Imaging:  CT head 11/21 - right cerebellar ICH  I personally reviewed the above image.  Today I independently reviewed pertinent medications, lines/drains/airways, imaging, cardiology results, laboratory results, microbiology results, notably:   ASSESSMENT/PLAN:     Active Hospital Problems    Diagnosis    *Cerebral aneurysm    Tobacco dependency    Chronic diastolic congestive heart failure    Pure hypercholesterolemia    Debility    Essential hypertension      Neuro:   Post clipping of multiple cerebral aneurysms  Interval cerebellar ICH  CTA/CTV head. Negative  for venous sinus thrombosis  cEEG   - reviewed with epilepsy team, no seizures  neuro exam is out of proportion to perfusion deficit on CTP  MRI brain - post surgical changes with post surgical edema  Na goal 140-150  Keppra 500mg [id number]    Pulmonary:   Saturating >92% on room air    Cardiac: "   Discussed with neurosurgery will allow higher blood pressure to test the hypothesis that neurological exam is pressure dependent  MAP goals 65-110mmhg  SBP <180    Renal:    Making urine  BUN/Cr <20    ID:   Afebrile, leucocytosis trending down    Hem/Onc:   Stable hh and plt count    Endocrine:    BS stable will monitor    Fluids/Electrolytes/Nutrition/GI:     Lines:  Art +  CVC NA  ETT NA  Tatum NA  NG NA  PEG NA    Proph:  DVT:SCD/ no heparin   Constipation:  Last output:bowel regimen as needed  PUP: NA  VAP:NA    Critical condition in that Patient has a condition that poses threat to life and bodily function: ICH, cerebral edema, seizure     35 minutes of Critical care time was spent personally by me on the following activities: development of treatment plan with patient or surrogate and bedside caregivers, discussions with consultants, evaluation of patient's response to treatment, examination of patient, ordering and performing treatments and interventions, ordering and review of laboratory studies, ordering and review of radiographic studies, pulse oximetry, antibiotic titration if applicable, vasopressor titration if applicable, re-evaluation of patient's condition. This critical care time did not overlap with that of any other provider or involve time for any procedures. There is high probability for acute neurological change leading to clinical and possibly life-threatening deterioration requiring highest level of physician preparedness for urgent intervention.    Gregor Velazquez MD, FNCS  Neurocritical care attending

## 2024-11-24 NOTE — PROGRESS NOTES
[facility] - Neuro Critical Care  Neurosurgery  Progress Note    Subjective:     History of Present Illness: Isabel Coats is a 73-year-old female past medical history of hypertension, obstructive sleep apnea on CPAP, suggestive heart failure, obesity, history of gastric cancer status post chemoradiation and DVT in currently on Eliquis. She presents to [facility] L pterional crani, 7 clips applied in proximity to large irregular L MCA bifurcation aneurysm and other adjacent small aneurysms, 1 clip applied across neck of aneurysm just proximal to anterior choroidal.  She also has a current smoker she was initially referred to Neurology for workup for dizziness. She had been complaining of vertigo multiple times a day for the past several months, associated with shortness of breath, palpitation, nausea, headache, and feeling faint. She is admitted to [facility] for a higher level of care.     Post-Op Info:  Procedure(s) (LRB):  CRANIOTOMY, WITH ANEURYSM CLIPPING W/ STEALTH (Left)  CRANIOTOMY, FOR ANEURYSM OF VERTEBROBASILAR OR CAROTID CIRCULATION (Left)  DISSECTION, NERVE, USING OPERATING MICROSCOPE (Left)   4 Days Post-Op   Interval History: NAEON. Pt exam stable this morning. Pts mentation improved. Now responding to simple questions and making sense.     Medications:  Continuous Infusions:   0.9% NaCl   Intravenous Continuous 50 mL/hr at 11/24/24 1202 Rate Verify at 11/24/24 1202    nicardipine  0-15 mg/hr Intravenous Continuous   Stopped at 11/24/24 1030     Scheduled Meds:   atorvastatin  20 mg Per NG tube Daily    ceFAZolin (Ancef) IV (PEDS and ADULTS)  2 g Intravenous Q8H    heparin (porcine)  5,000 Units Subcutaneous Q8H    levETIRAcetam (Keppra) IV (PEDS and ADULTS)  1,000 mg Intravenous Q12H    mupirocin   Nasal BID     PRN Meds:  Current Facility-Administered Medications:     acetaminophen, 650 mg, Per NG tube, Q4H PRN    albuterol-ipratropium, 3 mL, Nebulization, Q6H PRN    bisacodyL, 10 mg, Rectal, Daily PRN     hydrALAZINE, 10 mg, Intravenous, Q6H PRN    magnesium oxide, 800 mg, Per NG tube, PRN    magnesium oxide, 800 mg, Per NG tube, PRN    morphine, 2 mg, Intravenous, Q6H PRN    naloxone, 0.4 mg, Intravenous, PRN    ondansetron, 4 mg, Intravenous, Q12H PRN    oxyCODONE, 5 mg, Per NG tube, Q6H PRN    potassium bicarbonate, 35 mEq, Per NG tube, PRN    potassium bicarbonate, 50 mEq, Per NG tube, PRN    potassium bicarbonate, 60 mEq, Per NG tube, PRN    potassium, sodium phosphates, 2 packet, Per NG tube, PRN    potassium, sodium phosphates, 2 packet, Per NG tube, PRN    potassium, sodium phosphates, 2 packet, Per NG tube, PRN    sodium chloride 0.9%, 10 mL, Intravenous, PRN     Review of Systems  Objective:     Weight: 123.4 kg (272 lb)  Body mass index is 43.9 kg/m².  Vital Signs (Most Recent):  Temp: 97.9 °F (36.6 °C) (11/24/24 1102)  Pulse: 73 (11/24/24 1202)  Resp: (!) 23 (11/24/24 1202)  BP: (!) 148/59 (11/24/24 1202)  SpO2: 98 % (11/24/24 1202) Vital Signs (24h Range):  Temp:  [97.7 °F (36.5 °C)-99 °F (37.2 °C)] 97.9 °F (36.6 °C)  Pulse:  [] 73  Resp:  [22-49] 23  SpO2:  [94 %-99 %] 98 %  BP: (120-159)/(55-71) 148/59  Arterial Line BP: (106-270)/() 152/54     Date 11/24/24 0700 - 11/25/24 0659   Shift 3095-2422 0789-0311 1274-0464 24 Hour Total   INTAKE   I.V.(mL/kg) 394.5(3.2)   394.5(3.2)   NG/   120   IV Piggyback 466.5   466.5   Shift Total(mL/kg) 981(8)   981(8)   OUTPUT   Urine(mL/kg/hr) 1350   1350   Shift Total(mL/kg) 1350(10.9)   1350(10.9)   Weight (kg) 123.4 123.4 123.4 123.4                            Closed/Suction Drain 11/20/24 1930 Scalp Accordion 10 Fr. (Active)   Site Description Unable to view 11/24/24 1102   Dressing Type Gauze 11/24/24 1102   Dressing Status Clean;Dry;Intact 11/24/24 1102   Dressing Intervention Integrity maintained 11/24/24 1102   Drainage Serosanguineous 11/22/24 1505   Status To bulb suction 11/24/24 1102   Output (mL) 60 mL 11/24/24 0605            NG/OG  Tube 11/21/24 1800 Right nostril (Active)   Placement Check placement verified by distal tube length measurement 11/24/24 1102   Tolerance no signs/symptoms of discomfort 11/24/24 1102   Securement secured to nostril center w/ adhesive device 11/24/24 1102   Clamp Status/Tolerance unclamped 11/24/24 1102   Suction Setting/Drainage Method suction at the bedside 11/24/24 1102   Insertion Site Appearance no redness, warmth, tenderness, skin breakdown, drainage 11/24/24 1102   Drainage None 11/24/24 1102   Flush/Irrigation flushed w/;water;no resistance met 11/24/24 1102   Feeding Type continuous;by pump 11/24/24 1102   Feeding Action feeding continued 11/24/24 1102   Current Rate (mL/hr) 10 mL/hr 11/23/24 1905   Intake (mL) 50 mL 11/24/24 1002   Water Bolus (mL) 60 mL 11/22/24 1605   Formula Name Isosource 1.5 11/24/24 1102   Intake (mL) - Formula Tube Feeding 20 11/24/24 1202   Residual Amount (ml) 0 ml 11/22/24 1505            Rectal Tube 11/23/24 1200 rectal tube w/ balloon (indicate number of mLs) (Active)   Balloon Inflation Volume (mL) 45 11/24/24 1102   Reposition rectal tube advanced slightly 11/24/24 1102   Outcome gas expelled, stool evacuated 11/24/24 1102   Stool Color Brown 11/24/24 1102   Insertion Site Appearance no redness, warmth, tenderness, skin breakdown, drainage 11/24/24 1102   Flush/Irrigation flushed w/;water;no resistance met 11/24/24 1102   Rectal Tube Output 50 mL 11/24/24 0605       Female External Urinary Catheter w/ Suction 11/21/24 1400 (Active)   Skin no redness;no breakdown 11/24/24 1102   Tolerance no signs/symptoms of discomfort 11/24/24 1102   Suction Continuous suction at 40 mmHg 11/24/24 0702   Date of last wick change 11/23/24 11/24/24 0702   Output (mL) 650 mL 11/24/24 1002          Physical Exam         Neurosurgery Physical Exam  [id number]  both pupil brisk  mild R CN3 palsy  L gaze pref but crosses midline  FC x4 AG in uppers  minimal in lowers   Significant Labs:  Recent Labs  "  Lab 11/23/24  0117 11/24/24  0051   * 108   * 148*   K 3.5 3.7   * 118*   CO2 21* 21*   BUN 14 11   CREATININE 0.7 0.7   CALCIUM 8.5* 8.8   MG 2.1 2.3     Recent Labs   Lab 11/23/24  0117 11/24/24  0051   WBC 21.18* 15.12*   HGB 8.2* 8.5*   HCT 25.0* 26.8*    184     No results for input(s): "LABPT", "INR", "APTT" in the last 48 hours.  Microbiology Results (last 7 days)       ** No results found for the last 168 hours. **          All pertinent labs from the last 24 hours have been reviewed.    Significant Diagnostics:  I have reviewed all pertinent imaging results/findings within the past 24 hours.  Assessment/Plan:     * Cerebral aneurysm  Isabel Coats is a 73-year-old female past medical history of hypertension, obstructive sleep apnea on CPAP, suggestive heart failure, obesity, history of gastric cancer status post chemoradiation and DVT in currently on Eliquis. She presents to [facility] L pterional crani, 7 clips applied in proximity to large irregular L MCA bifurcation aneurysm and other adjacent small aneurysms, 1 clip applied across neck of aneurysm just proximal to anterior choroidal.  She also has a current smoker she was initially referred to Neurology for workup for dizziness. She had been complaining of vertigo multiple times a day for the past several months, associated with shortness of breath, palpitation, nausea, headache, and feeling faint. She is admitted to [facility] for a higher level of care.      Imaging:  CTA Head 11/20: expected post-op changes  CTH 11/21 8 AM: right sided cerebellar IPH  CTA 11/21 10 AM: stable IPH, possible frontal contusion  CTP 11/21 4 PM: possible left anterior temporal perfusion deficit in territory of branches distal to MCA clips     Plans:  -Admitted to ICU  -q1h neurocheck  -All labs and imaging were reviewed  -Strict SBP <120, MAP >65  -Keep the drain to suction  -Continue to monitor respiratory status - ABG yesterday with normal CO2 at time of " AMS  -keppra 1g BID. cEEG negative  -MRI- no strokes, expected post op changes, and stable cerebellar bleed.  -continue milrinone, currently at 0.5 - seems to have mild improvement with it started. keep euvolemic to net +. F/u TCDS per NCC  -Pain control  -PT/OT/OOB  -Continue to monitor clinically, notify NSGY immediately with any changes in neuro status     Discussed with staff Dr. Alfred Dexter MD  Neurosurgery  [facility] - Neuro Critical Care

## 2024-11-25 LAB
ALBUMIN SERPL BCP-MCNC: 2.7 G/DL (ref 3.5–5.2)
ALP SERPL-CCNC: 82 U/L (ref 40–150)
ALT SERPL W/O P-5'-P-CCNC: 15 U/L (ref 10–44)
ANION GAP SERPL CALC-SCNC: 7 MMOL/L (ref 8–16)
AST SERPL-CCNC: 26 U/L (ref 10–40)
BASOPHILS # BLD AUTO: 0.03 K/UL (ref 0–0.2)
BASOPHILS NFR BLD: 0.2 % (ref 0–1.9)
BILIRUB SERPL-MCNC: 0.5 MG/DL (ref 0.1–1)
BUN SERPL-MCNC: 11 MG/DL (ref 8–23)
CALCIUM SERPL-MCNC: 8.9 MG/DL (ref 8.7–10.5)
CHLORIDE SERPL-SCNC: 117 MMOL/L (ref 95–110)
CO2 SERPL-SCNC: 22 MMOL/L (ref 23–29)
CREAT SERPL-MCNC: 0.6 MG/DL (ref 0.5–1.4)
DIFFERENTIAL METHOD BLD: ABNORMAL
EOSINOPHIL # BLD AUTO: 0 K/UL (ref 0–0.5)
EOSINOPHIL NFR BLD: 0.3 % (ref 0–8)
ERYTHROCYTE [DISTWIDTH] IN BLOOD BY AUTOMATED COUNT: 13.7 % (ref 11.5–14.5)
EST. GFR  (NO RACE VARIABLE): >60 ML/MIN/1.73 M^2
GLUCOSE SERPL-MCNC: 124 MG/DL (ref 70–110)
HCT VFR BLD AUTO: 28.3 % (ref 37–48.5)
HGB BLD-MCNC: 8.6 G/DL (ref 12–16)
IMM GRANULOCYTES # BLD AUTO: 0.11 K/UL (ref 0–0.04)
IMM GRANULOCYTES NFR BLD AUTO: 0.8 % (ref 0–0.5)
LYMPHOCYTES # BLD AUTO: 2.2 K/UL (ref 1–4.8)
LYMPHOCYTES NFR BLD: 15.7 % (ref 18–48)
MAGNESIUM SERPL-MCNC: 2.4 MG/DL (ref 1.6–2.6)
MCH RBC QN AUTO: 31.7 PG (ref 27–31)
MCHC RBC AUTO-ENTMCNC: 30.4 G/DL (ref 32–36)
MCV RBC AUTO: 104 FL (ref 82–98)
MONOCYTES # BLD AUTO: 1.3 K/UL (ref 0.3–1)
MONOCYTES NFR BLD: 9.2 % (ref 4–15)
NEUTROPHILS # BLD AUTO: 10.3 K/UL (ref 1.8–7.7)
NEUTROPHILS NFR BLD: 73.8 % (ref 38–73)
NRBC BLD-RTO: 0 /100 WBC
PHOSPHATE SERPL-MCNC: 2.2 MG/DL (ref 2.7–4.5)
PLATELET # BLD AUTO: 185 K/UL (ref 150–450)
PMV BLD AUTO: 10.9 FL (ref 9.2–12.9)
POTASSIUM SERPL-SCNC: 3.8 MMOL/L (ref 3.5–5.1)
PROT SERPL-MCNC: 5.9 G/DL (ref 6–8.4)
RBC # BLD AUTO: 2.71 M/UL (ref 4–5.4)
SODIUM SERPL-SCNC: 146 MMOL/L (ref 136–145)
WBC # BLD AUTO: 13.98 K/UL (ref 3.9–12.7)

## 2024-11-25 PROCEDURE — 25000003 PHARM REV CODE 250: Mod: HCNC | Performed by: PSYCHIATRY & NEUROLOGY

## 2024-11-25 PROCEDURE — 25000003 PHARM REV CODE 250: Mod: HCNC

## 2024-11-25 PROCEDURE — 99900035 HC TECH TIME PER 15 MIN (STAT): Mod: HCNC

## 2024-11-25 PROCEDURE — 63600175 PHARM REV CODE 636 W HCPCS: Mod: HCNC

## 2024-11-25 PROCEDURE — 25000242 PHARM REV CODE 250 ALT 637 W/ HCPCS: Mod: HCNC | Performed by: NURSE PRACTITIONER

## 2024-11-25 PROCEDURE — 63600175 PHARM REV CODE 636 W HCPCS: Mod: HCNC | Performed by: NURSE PRACTITIONER

## 2024-11-25 PROCEDURE — 80053 COMPREHEN METABOLIC PANEL: CPT | Mod: HCNC

## 2024-11-25 PROCEDURE — 83735 ASSAY OF MAGNESIUM: CPT | Mod: HCNC | Performed by: NURSE PRACTITIONER

## 2024-11-25 PROCEDURE — 25000003 PHARM REV CODE 250: Mod: HCNC | Performed by: NURSE PRACTITIONER

## 2024-11-25 PROCEDURE — 99499 UNLISTED E&M SERVICE: CPT | Mod: HCNC,,, | Performed by: NURSE PRACTITIONER

## 2024-11-25 PROCEDURE — 92507 TX SP LANG VOICE COMM INDIV: CPT | Mod: HCNC

## 2024-11-25 PROCEDURE — 94761 N-INVAS EAR/PLS OXIMETRY MLT: CPT | Mod: HCNC

## 2024-11-25 PROCEDURE — 20000000 HC ICU ROOM: Mod: HCNC

## 2024-11-25 PROCEDURE — 85025 COMPLETE CBC W/AUTO DIFF WBC: CPT | Mod: HCNC | Performed by: NURSE PRACTITIONER

## 2024-11-25 PROCEDURE — 99291 CRITICAL CARE FIRST HOUR: CPT | Mod: HCNC,FS,, | Performed by: PSYCHIATRY & NEUROLOGY

## 2024-11-25 PROCEDURE — 84100 ASSAY OF PHOSPHORUS: CPT | Mod: HCNC | Performed by: NURSE PRACTITIONER

## 2024-11-25 PROCEDURE — 92526 ORAL FUNCTION THERAPY: CPT | Mod: HCNC

## 2024-11-25 RX ORDER — LEVETIRACETAM 100 MG/ML
1000 SOLUTION ORAL 2 TIMES DAILY
Status: DISCONTINUED | OUTPATIENT
Start: 2024-11-25 | End: 2024-11-29

## 2024-11-25 RX ORDER — HYDRALAZINE HYDROCHLORIDE 20 MG/ML
10 INJECTION INTRAMUSCULAR; INTRAVENOUS EVERY 4 HOURS PRN
Status: DISCONTINUED | OUTPATIENT
Start: 2024-11-25 | End: 2024-12-16 | Stop reason: HOSPADM

## 2024-11-25 RX ORDER — GABAPENTIN 250 MG/5ML
600 SOLUTION ORAL EVERY 8 HOURS
Status: DISCONTINUED | OUTPATIENT
Start: 2024-11-25 | End: 2024-11-29

## 2024-11-25 RX ORDER — NICARDIPINE HYDROCHLORIDE 0.2 MG/ML
0-15 INJECTION INTRAVENOUS CONTINUOUS
Status: DISCONTINUED | OUTPATIENT
Start: 2024-11-25 | End: 2024-11-26

## 2024-11-25 RX ORDER — CITALOPRAM 10 MG/1
10 TABLET ORAL DAILY
Status: DISCONTINUED | OUTPATIENT
Start: 2024-11-25 | End: 2024-11-29

## 2024-11-25 RX ORDER — LISINOPRIL 20 MG/1
40 TABLET ORAL DAILY
Status: DISCONTINUED | OUTPATIENT
Start: 2024-11-26 | End: 2024-11-29

## 2024-11-25 RX ORDER — LISINOPRIL 20 MG/1
20 TABLET ORAL ONCE
Status: COMPLETED | OUTPATIENT
Start: 2024-11-25 | End: 2024-11-25

## 2024-11-25 RX ORDER — LISINOPRIL 20 MG/1
20 TABLET ORAL DAILY
Status: DISCONTINUED | OUTPATIENT
Start: 2024-11-25 | End: 2024-11-25

## 2024-11-25 RX ORDER — HEPARIN SODIUM 5000 [USP'U]/ML
7500 INJECTION, SOLUTION INTRAVENOUS; SUBCUTANEOUS EVERY 8 HOURS
Status: DISCONTINUED | OUTPATIENT
Start: 2024-11-25 | End: 2024-12-13

## 2024-11-25 RX ADMIN — LISINOPRIL 20 MG: 20 TABLET ORAL at 09:11

## 2024-11-25 RX ADMIN — GABAPENTIN 600 MG: 250 SOLUTION ORAL at 09:11

## 2024-11-25 RX ADMIN — HEPARIN SODIUM 7500 UNITS: 5000 INJECTION INTRAVENOUS; SUBCUTANEOUS at 03:11

## 2024-11-25 RX ADMIN — PSYLLIUM HUSK 1 PACKET: 3.4 POWDER ORAL at 09:11

## 2024-11-25 RX ADMIN — HEPARIN SODIUM 5000 UNITS: 5000 INJECTION INTRAVENOUS; SUBCUTANEOUS at 05:11

## 2024-11-25 RX ADMIN — POTASSIUM & SODIUM PHOSPHATES POWDER PACK 280-160-250 MG 2 PACKET: 280-160-250 PACK at 05:11

## 2024-11-25 RX ADMIN — CEFAZOLIN 2 G: 2 INJECTION, POWDER, FOR SOLUTION INTRAMUSCULAR; INTRAVENOUS at 05:11

## 2024-11-25 RX ADMIN — CITALOPRAM HYDROBROMIDE 10 MG: 10 TABLET ORAL at 09:11

## 2024-11-25 RX ADMIN — MUPIROCIN: 20 OINTMENT TOPICAL at 08:11

## 2024-11-25 RX ADMIN — NICARDIPINE HYDROCHLORIDE 2.5 MG/HR: 0.2 INJECTION, SOLUTION INTRAVENOUS at 11:11

## 2024-11-25 RX ADMIN — NICARDIPINE HYDROCHLORIDE 10 MG/HR: 0.2 INJECTION, SOLUTION INTRAVENOUS at 09:11

## 2024-11-25 RX ADMIN — LEVETIRACETAM 1000 MG: 500 SOLUTION ORAL at 09:11

## 2024-11-25 RX ADMIN — CEFAZOLIN 2 G: 2 INJECTION, POWDER, FOR SOLUTION INTRAMUSCULAR; INTRAVENOUS at 03:11

## 2024-11-25 RX ADMIN — GABAPENTIN 600 MG: 250 SOLUTION ORAL at 03:11

## 2024-11-25 RX ADMIN — HYDRALAZINE HYDROCHLORIDE 10 MG: 20 INJECTION, SOLUTION INTRAMUSCULAR; INTRAVENOUS at 10:11

## 2024-11-25 RX ADMIN — ACETAMINOPHEN 650 MG: 325 TABLET ORAL at 11:11

## 2024-11-25 RX ADMIN — ATORVASTATIN CALCIUM 20 MG: 20 TABLET, FILM COATED ORAL at 08:11

## 2024-11-25 RX ADMIN — NICARDIPINE HYDROCHLORIDE 7.5 MG/HR: 0.2 INJECTION, SOLUTION INTRAVENOUS at 05:11

## 2024-11-25 RX ADMIN — LEVETIRACETAM 1000 MG: 100 INJECTION INTRAVENOUS at 08:11

## 2024-11-25 RX ADMIN — HEPARIN SODIUM 7500 UNITS: 5000 INJECTION INTRAVENOUS; SUBCUTANEOUS at 09:11

## 2024-11-25 RX ADMIN — OXYCODONE 5 MG: 5 TABLET ORAL at 11:11

## 2024-11-25 RX ADMIN — POTASSIUM BICARBONATE 50 MEQ: 978 TABLET, EFFERVESCENT ORAL at 05:11

## 2024-11-25 RX ADMIN — POTASSIUM & SODIUM PHOSPHATES POWDER PACK 280-160-250 MG 2 PACKET: 280-160-250 PACK at 08:11

## 2024-11-25 NOTE — ASSESSMENT & PLAN NOTE
STABLE  Appears to be euvolemic and maintains hemodynamic stability.      Left Ventricle: The left ventricle is normal in size. Normal wall thickness. There is normal systolic function with a visually estimated ejection fraction of 60 - 65%. There is normal diastolic function.    Right Ventricle: Normal right ventricular cavity size. Wall thickness is normal. Systolic function is normal.    Left Atrium: Left atrium is severely dilated.    Right Atrium: Right atrium is dilated.    Aortic Valve: The aortic valve is a trileaflet valve. There is moderate aortic valve sclerosis. Mildly restricted motion.    Tricuspid Valve: There is mild regurgitation.    IVC/SVC: Normal venous pressure at 3 mmHg.

## 2024-11-25 NOTE — ASSESSMENT & PLAN NOTE
Ms. 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 CTA was negative for new occlusion, EEG was negative for epileptiform activity, and MRI revealed edema in the L anterior temporal lobe and posterior inferior frontal lobes but without changes that would explain patient's clinical exam. Has been maintained on milrinone/nicardipine to maintain SBP<120, MAP>65. After discussion with NSGY staff, we will liberalize MAP goal and follow neuro exam to monitor for improvement with higher systolic pressures.    - Admit to NCC   - s/p L pterional crani, 7 clips applied in proximity to large irregular L MCA bifurcation aneurysm and other adjacent small aneurysms, 1 clip applied across neck of aneurysm just proximal to anterior choroidal.   - Na goal euna  - Q 1h vitals  - Q 1h neuro checks  - Keppra 1g BID  - s/p dex 4 q 6 x 3 doses  - post-op ancef x5d  11/25/2024: -160  Restart home gabapentin and citalopram  Pending SLP eval  D/c IVF

## 2024-11-25 NOTE — SUBJECTIVE & OBJECTIVE
Review of Systems  Constitutional: Denies fevers or chills.  Pulmonary: Denies shortness of breath or cough.  Cardiology: Denies chest pain or palpitations.  GI: Denies abdominal pain or constipation.  Neurologic: Denies new weakness,  headache, or paresthesias.  Objective:     Vitals:  Temp: 97.9 °F (36.6 °C)  Pulse: 62  Rhythm: normal sinus rhythm  BP: (!) 158/73  MAP (mmHg): 105  Resp: 20  SpO2: 97 %    Temp  Min: 97.7 °F (36.5 °C)  Max: 98.9 °F (37.2 °C)  Pulse  Min: 62  Max: 108  BP  Min: 143/63  Max: 170/72  MAP (mmHg)  Min: 90  Max: 110  Resp  Min: 14  Max: 32  SpO2  Min: 95 %  Max: 99 %    11/24 0701 - 11/25 0700  In: 2773.5 [I.V.:1297]  Out: 4680 [Urine:4500; Drains:80]   Unmeasured Output  Urine Occurrence: 1  Stool Occurrence: 2  Pad Count: 1        Physical Exam  Constitutional:       Appearance: She is obese.   HENT:      Head: Normocephalic.      Mouth/Throat:      Mouth: Mucous membranes are moist.   Cardiovascular:      Rate and Rhythm: Normal rate and regular rhythm.      Pulses: Normal pulses.   Abdominal:      Palpations: Abdomen is soft.   Neurological:      Mental Status: She is alert.         Physical Exam  Constitutional:       Appearance: She is obese.   HENT:      Head: Normocephalic.      Mouth/Throat:      Mouth: Mucous membranes are moist.   Cardiovascular:      Rate and Rhythm: Normal rate and regular rhythm.      Pulses: Normal pulses.   Abdominal:      Palpations: Abdomen is soft.   Neurological:      Mental Status: She is alert.       GA: Alert, comfortable, no acute distress.   HEENT: No scleral icterus or JVD.   Skin: No jaundice, rashes, or visible lesions.  Neuro:  --GCS: E4 V2 M4  --Mental Status:  awake, tracks, oriented to self, dysarthric, delayed responses, nods intermittently, follows simple commands  --Pupils L pupil intermittently sluggish, R pupil brisk 3->2mm  --Corneal reflex, gag, cough intact.  --Moves all extremities spont.         Medications:  Continuousnicardipine, Last Rate: 2.5 mg/hr (11/25/24 1202)    Scheduledatorvastatin, 20 mg, Daily  ceFAZolin (Ancef) IV (PEDS and ADULTS), 2 g, Q8H  citalopram, 10 mg, Daily  gabapentin, 600 mg, Q8H  heparin (porcine), 7,500 Units, Q8H  levetiracetam, 1,000 mg, BID  lisinopriL, 20 mg, Daily  psyllium husk (aspartame), 1 packet, BID    PRNacetaminophen, 650 mg, Q4H PRN  albuterol-ipratropium, 3 mL, Q6H PRN  bisacodyL, 10 mg, Daily PRN  hydrALAZINE, 10 mg, Q4H PRN  magnesium oxide, 800 mg, PRN  magnesium oxide, 800 mg, PRN  morphine, 2 mg, Q6H PRN  naloxone, 0.4 mg, PRN  ondansetron, 4 mg, Q12H PRN  oxyCODONE, 5 mg, Q6H PRN  potassium bicarbonate, 35 mEq, PRN  potassium bicarbonate, 50 mEq, PRN  potassium bicarbonate, 60 mEq, PRN  potassium, sodium phosphates, 2 packet, PRN  potassium, sodium phosphates, 2 packet, PRN  potassium, sodium phosphates, 2 packet, PRN  sodium chloride 0.9%, 10 mL, PRN      Today I personally reviewed pertinent medications, lines/drains/airways, imaging, cardiology results, laboratory results, microbiology results,     Diet  Diet NPO

## 2024-11-25 NOTE — PT/OT/SLP PROGRESS
Physical Therapy      Patient Name:  Ca Coats   MRN:  [id number]    Attempt at 13:06. Patient not seen today secondary to session held per therapist's assessment pt systolic BP > set parameter of 160 mmHg. Pt with increased WOB and O2 @ 95% when asking orientation questions.     Will follow-up 11/26/24.

## 2024-11-25 NOTE — NURSING
Raine waveform dampened, no blood return, difficult to flush. Re-dressed and repositioned a line still not functioning. PADMINI Kramer informed and came to bedside and was able have blood return, wave form still dampened. Will keep Raine for blood draw for now and follow cuff pressure for BP per Nia [place].

## 2024-11-25 NOTE — PLAN OF CARE
SW attempted to contact Pt's daughter, Lillian, to discuss discharge planning, SW left a brief message for a return call on voicemail.    2:42 PM  SW spoke with Lillian at bedside and provided a list of SNF facilities for her review. She will have choices tomorrow. SW may wait to send SNF referral, as Pt is expected to get a PEG.ABIGAIL will confer with Pt's MD team at rounds on 11/26.    ESTRELLA Pitts, JENNIFER  [facility]  [id number]

## 2024-11-25 NOTE — PLAN OF CARE
[facility]  Discharge Reassessment    Primary Care Provider: Lei Garcia MD    Expected Discharge Date: 11/27/2024    Reassessment (most recent)       Discharge Reassessment - 11/25/24 1145          Discharge Reassessment    Assessment Type Discharge Planning Reassessment (P)      Did the patient's condition or plan change since previous assessment? Yes (P)      Discharge Plan discussed with: Adult children (P)      Communicated ZAIDA with patient/caregiver Date not available/Unable to determine (P)      Discharge Plan A Skilled Nursing Facility (P)      Discharge Plan B Home Health (P)      DME Needed Upon Discharge  -- (P)    tbd    Transition of Care Barriers None (P)      Why the patient remains in the hospital Requires continued medical care (P)         Post-Acute Status    Coverage [employer] (P)      Patient choice form signed by patient/caregiver List with quality metrics by geographic area provided (P)      Discharge Delays Change in Medical Condition (P)                    Discharge Plan A and Plan B have been determined by review of patient's clinical status, future medical and therapeutic needs, and coverage/benefits for post-acute care in coordination with multidisciplinary team members.     Pt requiring continued medical care. Pt's likely disposition is:SNF. MD and PT/OT paxton to assist in determining final disposition. SW in communication with the Pt and family.     SW will follow.    ESTRELLA Pitts LMSW  [facility]  [id number]

## 2024-11-25 NOTE — PLAN OF CARE
11/25/24 1506   Post-Acute Status   Post-Acute Authorization Placement   Post-Acute Placement Status Patient List Provided   Coverage [employer]   Patient choice form signed by patient/caregiver List with quality metrics by geographic area provided   Discharge Delays (!) Change in Medical Condition   Discharge Plan   Discharge Plan A Skilled Nursing Facility   Discharge Plan B Home Health     Met with Pt's dtr, Lillian to review discharge recommendation of skilled nursing placement and she is agreeable to plan    Patient/family provided list of facilities in-network with patient's payor plan. Providers that are owned, operated, or affiliated with [facility] are included on the list.     Patient/family instructed to identify preference. Lillian stated she will review the list, check Medicare.gov for  measures and provide choices tomorrow.     ESTRELLA Pitts, JENNIFER  [facility]  [id number]

## 2024-11-25 NOTE — SUBJECTIVE & OBJECTIVE
Interval History: 11/25: NAEON. Neuro exam stable. Drain output 70cc. CTM in ICU.    Medications:  Continuous Infusions:   nicardipine  0-15 mg/hr Intravenous Continuous 12.5 mL/hr at 11/25/24 1107 2.5 mg/hr at 11/25/24 1107     Scheduled Meds:   atorvastatin  20 mg Per NG tube Daily    ceFAZolin (Ancef) IV (PEDS and ADULTS)  2 g Intravenous Q8H    citalopram  10 mg Per NG tube Daily    gabapentin  600 mg Per NG tube Q8H    heparin (porcine)  7,500 Units Subcutaneous Q8H    levetiracetam  1,000 mg Per NG tube BID    lisinopriL  20 mg Per NG tube Daily    psyllium husk (aspartame)  1 packet Per NG tube BID     PRN Meds:  Current Facility-Administered Medications:     acetaminophen, 650 mg, Per NG tube, Q4H PRN    albuterol-ipratropium, 3 mL, Nebulization, Q6H PRN    bisacodyL, 10 mg, Rectal, Daily PRN    hydrALAZINE, 10 mg, Intravenous, Q4H PRN    magnesium oxide, 800 mg, Per NG tube, PRN    magnesium oxide, 800 mg, Per NG tube, PRN    morphine, 2 mg, Intravenous, Q6H PRN    naloxone, 0.4 mg, Intravenous, PRN    ondansetron, 4 mg, Intravenous, Q12H PRN    oxyCODONE, 5 mg, Per NG tube, Q6H PRN    potassium bicarbonate, 35 mEq, Per NG tube, PRN    potassium bicarbonate, 50 mEq, Per NG tube, PRN    potassium bicarbonate, 60 mEq, Per NG tube, PRN    potassium, sodium phosphates, 2 packet, Per NG tube, PRN    potassium, sodium phosphates, 2 packet, Per NG tube, PRN    potassium, sodium phosphates, 2 packet, Per NG tube, PRN    sodium chloride 0.9%, 10 mL, Intravenous, PRN     Review of Systems  Objective:     Weight: 123.4 kg (272 lb)  Body mass index is 43.9 kg/m².  Vital Signs (Most Recent):  Temp: 97.9 °F (36.6 °C) (11/25/24 1102)  Pulse: 69 (11/25/24 1102)  Resp: (!) 25 (11/25/24 1128)  BP: (!) 166/72 (11/25/24 1102)  SpO2: 99 % (11/25/24 1102) Vital Signs (24h Range):  Temp:  [97.7 °F (36.5 °C)-98.9 °F (37.2 °C)] 97.9 °F (36.6 °C)  Pulse:  [] 69  Resp:  [14-32] 25  SpO2:  [95 %-99 %] 99 %  BP: (143-170)/(63-79)  166/72  Arterial Line BP: ()/() 130/108     Date 11/25/24 0700 - 11/26/24 0659   Shift 5631-3681 2302-5951 0224-4820 24 Hour Total   INTAKE   I.V.(mL/kg) 161.5(1.3)   161.5(1.3)   NG/   400   Shift Total(mL/kg) 561.5(4.6)   561.5(4.6)   OUTPUT   Urine(mL/kg/hr) 650   650   Shift Total(mL/kg) 650(5.3)   650(5.3)   Weight (kg) 123.4 123.4 123.4 123.4                            NG/OG Tube 11/21/24 1800 Right nostril (Active)   Placement Check placement verified by distal tube length measurement 11/25/24 1102   Tolerance no signs/symptoms of discomfort 11/25/24 1102   Securement secured to nostril center w/ adhesive device 11/25/24 1102   Clamp Status/Tolerance unclamped 11/25/24 1102   Suction Setting/Drainage Method suction at the bedside 11/25/24 1102   Insertion Site Appearance no redness, warmth, tenderness, skin breakdown, drainage 11/25/24 1102   Drainage None 11/25/24 1102   Flush/Irrigation flushed w/;water;no resistance met 11/25/24 1102   Feeding Type continuous;by pump 11/25/24 1102   Feeding Action feeding continued 11/25/24 1102   Current Rate (mL/hr) 50 mL/hr 11/25/24 1102   Goal Rate (mL/hr) 50 mL/hr 11/25/24 1102   Intake (mL) 200 mL 11/25/24 0902   Water Bolus (mL) 60 mL 11/22/24 1605   Formula Name Isosource 1.5 11/25/24 1102   Intake (mL) - Formula Tube Feeding 50 11/25/24 1102   Residual Amount (ml) 0 ml 11/22/24 1505            Rectal Tube 11/23/24 1200 rectal tube w/ balloon (indicate number of mLs) (Active)   Balloon Inflation Volume (mL) 45 11/25/24 1102   Reposition rectal tube advanced slightly 11/25/24 1102   Outcome gas expelled, stool evacuated 11/25/24 1102   Stool Color Brown 11/25/24 1102   Insertion Site Appearance no redness, warmth, tenderness, skin breakdown, drainage 11/25/24 1102   Flush/Irrigation flushed w/;water;no resistance met 11/25/24 1102   Intake (mL) 30 mL 11/25/24 0305   Rectal Tube Output 100 mL 11/25/24 0605       Female External Urinary Catheter w/  "Suction 11/21/24 1400 (Active)   Skin no redness;no breakdown 11/25/24 1102   Tolerance no signs/symptoms of discomfort 11/25/24 1102   Suction Continuous suction at 70 mmHg 11/24/24 1905   Date of last wick change 11/24/24 11/24/24 1905   Output (mL) 650 mL 11/25/24 1102          Physical Exam         Neurosurgery Physical Exam    [id number]  Oriented to self, aphasic  PERRL  EOMI (gaze pref resolved)  FC x4 AG      Significant Labs:  Recent Labs   Lab 11/24/24  0051 11/25/24  0106    124*   * 146*   K 3.7 3.8   * 117*   CO2 21* 22*   BUN 11 11   CREATININE 0.7 0.6   CALCIUM 8.8 8.9   MG 2.3 2.4     Recent Labs   Lab 11/24/24 0051 11/25/24  0106   WBC 15.12* 13.98*   HGB 8.5* 8.6*   HCT 26.8* 28.3*    185     No results for input(s): "LABPT", "INR", "APTT" in the last 48 hours.  Microbiology Results (last 7 days)       ** No results found for the last 168 hours. **          All pertinent labs from the last 24 hours have been reviewed.    Significant Diagnostics:  I have reviewed all pertinent imaging results/findings within the past 24 hours.  "

## 2024-11-25 NOTE — PT/OT/SLP PROGRESS
Speech Language Pathology Treatment    Patient Name:  Ca Coats   MRN:  [id number]  Admitting Diagnosis: Cerebral aneurysm    Recommendations:                 General Recommendations:  Dysphagia therapy, Speech/language therapy, and Cognitive-linguistic therapy  Diet recommendations:  NPO, Liquid Diet Level: NPO   *Pt clear for ice chips/small sips of water IF awake/alert and requesting  Aspiration Precautions: Continue alternate means of nutrition, Frequent oral care, HOB to 90 degrees, Monitor for s/s of aspiration, and Strict aspiration precautions   General Precautions: Standard, aspiration, aphasia, fall, NPO  Communication strategies:  provide increased time to answer and go to room if call light pushed    Assessment:     Ca Coats is a 73 y.o. female with an SLP diagnosis of Aphasia, Dysphagia, and Cognitive-Linguistic Impairment.   Subjective   Pt seen unaccompanied at the bedside with NGT and bilateral wrist restraints and left evelyn present. RN agreeable to SLP session. Pt with decreased level of awake/alertness this date, requiring frequent verbal and tactile stimulation from SLP throughout session.    Pain/Comfort:  Pain Rating 1: 0/10    Respiratory Status: Nasal cannula, flow 2 L/min    Objective:     Has the patient been evaluated by SLP for swallowing?   Yes  Keep patient NPO? Yes     Pt resting upon SLP arrival and easily awoke with verbal stimulation, but required frequent verbal and tactile cues t/o session to open her eyes and maintain wakeful state. Session eventually discontinued given lethargy. Pt oriented to self only, requiring max cues for month and unable to orient to place, situation, or year despite cueing. Pt able to answer simple yes/no questions and follow simple commands in function only, unable to respond to questions out of context. Pt able to begin automatic speech task, unable to provide more than 3 days of the week. Pt with no response to confrontation  naming task, unable to track to find object in front of her, but repeated word after SLP model.   HOB raised prior to PO intake. Pt accepted ice chip trial x1 requiring cues to close her lips around the utensil and visible sucking behavior noted with delayed initiation of swallow. Pt accepted thin liquids via tsp x2, cup edge x2, and straw sips x2, and puree x2 requiring intermittent cues to close lips around utensil, but swallow initiation appears more timely per palpation. Pt with clear vocal quality t/o trials, and no overt signs of aspiration noted. No further PO administered this date given ease of falling asleep between trials. Pt cleared for ice chips and small sips of water when awake/alert/requesting only. Recommend NPO with continued alternate means of nutrition.      Goals:   Multidisciplinary Problems       SLP Goals          Problem: SLP    Goal Priority Disciplines Outcome   SLP Goal     SLP Progressing   Description: Speech Language Pathology Goals  Goals expected to be met by 12/5:  1) Pt will participate in ongoing assessment of swallow function to determine least restrictive diet.  2) Pt will participate in ongoing speech, language, cognitive evaluation to determine possible goals and poc.   3) Pt will respond to simple yes/no questions with 80% accy given min cues.  4) Pt will complete automatic speech tasks with 90% accy given min cues.  5) Pt will complete naming tasks with 80% accy given mod cues.  6) Pt will follow simple commands with 90% accy given mod cues.                                 Plan:     Patient to be seen:  5 x/week   Plan of Care expires:  12/22/24  Plan of Care reviewed with:  patient   SLP Follow-Up:  Yes       Discharge recommendations:  High Intensity Therapy   Barriers to Discharge:  Level of Skilled Assistance Needed      Time Tracking:     SLP Treatment Date:   11/25/24  Speech Start Time:  1233  Speech Stop Time:  1253     Speech Total Time (min):  20 min    Billable  Minutes: Speech Therapy Individual 10 and Treatment Swallowing Dysfunction 10    11/25/2024

## 2024-11-25 NOTE — ASSESSMENT & PLAN NOTE
- SBP <160  - Nicardipine gtt and prn hydralazine   - initiated Lisinopril 20 mg daily  - cardene gtt available    - Echo:   Left Ventricle: The left ventricle is normal in size. Normal wall thickness. There is normal systolic function with a visually estimated ejection fraction of 60 - 65%. There is normal diastolic function.    Right Ventricle: Normal right ventricular cavity size. Wall thickness is normal. Systolic function is normal.    Left Atrium: Left atrium is severely dilated.    Right Atrium: Right atrium is dilated.    Aortic Valve: The aortic valve is a trileaflet valve. There is moderate aortic valve sclerosis. Mildly restricted motion.    Tricuspid Valve: There is mild regurgitation.    IVC/SVC: Normal venous pressure at 3 mmHg.

## 2024-11-25 NOTE — PLAN OF CARE
"[facility] Care Plan  POC reviewed with Ca Coats and family at 1400. Family verbalized understanding. Questions and concerns addressed. No acute events today. Pt progressing toward goals. Will continue to monitor. See below and flowsheets for full assessment and VS info.     - Phos replaced  - L pupil continues to be intermittently sluggish  - Tolerating Room Air for entire shift  - SBP goal changed to < 160  - Cardene gtt titrated to meet SBP goal  - Subgaleal Hemovac drain removed today  - Continuous NS gtt d/c  - Flexi removed today  - PRN Oxy & Tylenol x1 for pain    Is this a stroke patient? no    Neuro:  [place] Coma Scale  Best Eye Response: 4-->(E4) spontaneous  Best Motor Response: 6-->(M6) obeys commands  Best Verbal Response: 2-->(V2) incomprehensible speech  [place] Coma Scale Score: 12  Assessment Qualifiers: patient not sedated/intubated  Pupil PERRLA: no     24 hr Temp:  [97.7 °F (36.5 °C)-98.9 °F (37.2 °C)]     CV:   Rhythm: normal sinus rhythm  BP goals:   SBP < 160  MAP > 65    Resp:           Plan: N/A    GI/:     Diet/Nutrition Received: tube feeding  Last Bowel Movement: 11/23/24  Voiding Characteristics: external catheter    Intake/Output Summary (Last 24 hours) at 11/25/2024 1634  Last data filed at 11/25/2024 1602  Gross per 24 hour   Intake 2567.07 ml   Output 3480 ml   Net -912.93 ml     Unmeasured Output  Urine Occurrence: 1  Stool Occurrence: 2  Pad Count: 1    Labs/Accuchecks:  Recent Labs   Lab 11/25/24  0106   WBC 13.98*   RBC 2.71*   HGB 8.6*   HCT 28.3*         Recent Labs   Lab 11/25/24  0106   *   K 3.8   CO2 22*   *   BUN 11   CREATININE 0.6   ALKPHOS 82   ALT 15   AST 26   BILITOT 0.5      Recent Labs   Lab 11/21/24  0904   INR 1.0   APTT 25.4    No results for input(s): "CPK", "CPKMB", "TROPONINI", "MB" in the last 168 hours.    Electrolytes: Electrolytes replaced  Accuchecks: none    Gtts:   nicardipine  0-15 mg/hr Intravenous Continuous 37.5 " mL/hr at 24 1602 7.5 mg/hr at 24 1602       LDA/Wounds:    Domenico Risk Assessment  Sensory Perception: 3-->slightly limited  Moisture: 3-->occasionally moist  Activity: 1-->bedfast  Mobility: 2-->very limited  Nutrition: 3-->adequate  Friction and Shear: 1-->problem  Domenico Score: 13    Is your domenico score 12 or less? no            Restraints: L & R wrist and mitt  Restraint Order  Length of Order: Order good for next 24 hours or when removed.  Date that the current order will : 24  Time that the current order will : 1500  Order Upon Application: Yes    [facility]

## 2024-11-25 NOTE — PT/OT/SLP PROGRESS
Occupational Therapy      Patient Name:  Ca Coats   MRN:  [id number]    Patient not seen today secondary to session held per therapist's assessment pt systolic BP > set parameter of 160 mmHg & pt with increased WOB and O2 @ 95% when asking orientation questions. OT to follow up as appropriate.    11/25/2024

## 2024-11-25 NOTE — PROGRESS NOTES
[facility]  Neurocritical Care  Progress Note    Admit Date: 11/20/2024  Service Date: 11/25/2024  Length of Stay: 5    Subjective:     Chief Complaint: Cerebral aneurysm    History of Present Illness: Isabel Coats is a 73-year-old female past medical history of hypertension, obstructive sleep apnea on CPAP, suggestive heart failure, obesity, history of gastric cancer status post chemoradiation and DVT in currently on Eliquis. She presents to [facility] L pterional crani, 7 clips applied in proximity to large irregular L MCA bifurcation aneurysm and other adjacent small aneurysms, 1 clip applied across neck of aneurysm just proximal to anterior choroidal.  She also has a current smoker she was initially referred to Neurology for workup for dizziness. She had been complaining of vertigo multiple times a day for the past several months, associated with shortness of breath, palpitation, nausea, headache, and feeling faint. She is admitted to [facility] for a higher level of care.     Hospital Course: 11/21/2024 noted to have intermittentleft facial twitching and decreased mental status.   11/22/2024 left gaze preference. CT perfusion with decreased flow left insula  11/23/2024 frequent short runs of Vtach while on milrinone. Neuro exam stable, without major improvement. MRI yesterday with edema in the L anterior temporal lobe and L posterior inferior frontal lobe with stable IPH of the frontal lobe and R superior cerebellum. After discussion with NSGY, plan to liberalize MAP goal to 100  11/24/2024 NAEO. Liberalize   11/25/2024: SBP goal 100-160, add psyllium, restart home gabapentin and citalopram, d/c continous IVF, increase sq heparin to weight base, add cardene gtt, add Lisinopril,        Review of Systems  Constitutional: Denies fevers or chills.  Pulmonary: Denies shortness of breath or cough.  Cardiology: Denies chest pain or palpitations.  GI: Denies abdominal pain or constipation.  Neurologic:  Denies new weakness,  headache, or paresthesias.  Objective:     Vitals:  Temp: 97.9 °F (36.6 °C)  Pulse: 62  Rhythm: normal sinus rhythm  BP: (!) 158/73  MAP (mmHg): 105  Resp: 20  SpO2: 97 %    Temp  Min: 97.7 °F (36.5 °C)  Max: 98.9 °F (37.2 °C)  Pulse  Min: 62  Max: 108  BP  Min: 143/63  Max: 170/72  MAP (mmHg)  Min: 90  Max: 110  Resp  Min: 14  Max: 32  SpO2  Min: 95 %  Max: 99 %    11/24 0701 - 11/25 0700  In: 2773.5 [I.V.:1297]  Out: 4680 [Urine:4500; Drains:80]   Unmeasured Output  Urine Occurrence: 1  Stool Occurrence: 2  Pad Count: 1        Physical Exam  Constitutional:       Appearance: She is obese.   HENT:      Head: Normocephalic.      Mouth/Throat:      Mouth: Mucous membranes are moist.   Cardiovascular:      Rate and Rhythm: Normal rate and regular rhythm.      Pulses: Normal pulses.   Abdominal:      Palpations: Abdomen is soft.   Neurological:      Mental Status: She is alert.         Physical Exam  Constitutional:       Appearance: She is obese.   HENT:      Head: Normocephalic.      Mouth/Throat:      Mouth: Mucous membranes are moist.   Cardiovascular:      Rate and Rhythm: Normal rate and regular rhythm.      Pulses: Normal pulses.   Abdominal:      Palpations: Abdomen is soft.   Neurological:      Mental Status: She is alert.       GA: Alert, comfortable, no acute distress.   HEENT: No scleral icterus or JVD.   Skin: No jaundice, rashes, or visible lesions.  Neuro:  --GCS: E4 V2 M4  --Mental Status:  awake, tracks, oriented to self, dysarthric, delayed responses, nods intermittently, follows simple commands  --Pupils L pupil intermittently sluggish, R pupil brisk 3->2mm  --Corneal reflex, gag, cough intact.  --Moves all extremities spont.        Medications:  Continuousnicardipine, Last Rate: 2.5 mg/hr (11/25/24 1202)    Scheduledatorvastatin, 20 mg, Daily  ceFAZolin (Ancef) IV (PEDS and ADULTS), 2 g, Q8H  citalopram, 10 mg, Daily  gabapentin, 600 mg, Q8H  heparin (porcine), 7,500 Units,  Q8H  levetiracetam, 1,000 mg, BID  lisinopriL, 20 mg, Daily  psyllium husk (aspartame), 1 packet, BID    PRNacetaminophen, 650 mg, Q4H PRN  albuterol-ipratropium, 3 mL, Q6H PRN  bisacodyL, 10 mg, Daily PRN  hydrALAZINE, 10 mg, Q4H PRN  magnesium oxide, 800 mg, PRN  magnesium oxide, 800 mg, PRN  morphine, 2 mg, Q6H PRN  naloxone, 0.4 mg, PRN  ondansetron, 4 mg, Q12H PRN  oxyCODONE, 5 mg, Q6H PRN  potassium bicarbonate, 35 mEq, PRN  potassium bicarbonate, 50 mEq, PRN  potassium bicarbonate, 60 mEq, PRN  potassium, sodium phosphates, 2 packet, PRN  potassium, sodium phosphates, 2 packet, PRN  potassium, sodium phosphates, 2 packet, PRN  sodium chloride 0.9%, 10 mL, PRN      Today I personally reviewed pertinent medications, lines/drains/airways, imaging, cardiology results, laboratory results, microbiology results,     Diet  Diet NPO      Assessment/Plan:     Neuro  * Cerebral aneurysm  Ms. 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 CTA was negative for new occlusion, EEG was negative for epileptiform activity, and MRI revealed edema in the L anterior temporal lobe and posterior inferior frontal lobes but without changes that would explain patient's clinical exam. Has been maintained on milrinone/nicardipine to maintain SBP<120, MAP>65. After discussion with NSGY staff, we will liberalize MAP goal and follow neuro exam to monitor for improvement with higher systolic pressures.    - Admit to NCC   - s/p L pterional crani, 7 clips applied in proximity to large irregular L MCA bifurcation aneurysm and other adjacent small aneurysms, 1 clip applied across neck of aneurysm just proximal to anterior choroidal.   - Na goal euna  - Q 1h vitals  - Q 1h neuro checks  - Keppra 1g BID  - s/p dex 4 q 6 x 3 doses  - post-op ancef x5d  11/25/2024: SBP  100-160  Restart home gabapentin and citalopram  Pending SLP eval  D/c IVF         Cardiac/Vascular  Pure hypercholesterolemia  - home lipitor 20    Chronic diastolic congestive heart failure  STABLE  Appears to be euvolemic and maintains hemodynamic stability.      Left Ventricle: The left ventricle is normal in size. Normal wall thickness. There is normal systolic function with a visually estimated ejection fraction of 60 - 65%. There is normal diastolic function.    Right Ventricle: Normal right ventricular cavity size. Wall thickness is normal. Systolic function is normal.    Left Atrium: Left atrium is severely dilated.    Right Atrium: Right atrium is dilated.    Aortic Valve: The aortic valve is a trileaflet valve. There is moderate aortic valve sclerosis. Mildly restricted motion.    Tricuspid Valve: There is mild regurgitation.    IVC/SVC: Normal venous pressure at 3 mmHg.    Essential hypertension  - SBP <160  - Nicardipine gtt and prn hydralazine   - initiated Lisinopril 20 mg daily  - cardene gtt available    - Echo:   Left Ventricle: The left ventricle is normal in size. Normal wall thickness. There is normal systolic function with a visually estimated ejection fraction of 60 - 65%. There is normal diastolic function.    Right Ventricle: Normal right ventricular cavity size. Wall thickness is normal. Systolic function is normal.    Left Atrium: Left atrium is severely dilated.    Right Atrium: Right atrium is dilated.    Aortic Valve: The aortic valve is a trileaflet valve. There is moderate aortic valve sclerosis. Mildly restricted motion.    Tricuspid Valve: There is mild regurgitation.    IVC/SVC: Normal venous pressure at 3 mmHg.    Other  Debility  - PT/OT eval and treat     Gait instability  - PT/OT          The patient is being Prophylaxed for:  Venous Thromboembolism with: Chemical  Stress Ulcer with: None  Ventilator Pneumonia with: not applicable    Activity Orders            Elevate HOB  Elevate (30-45 degrees) Elevate HOB to 30 - 45 degrees during feeding unless otherwise stated starting at 11/22 1620    Turn patient starting at 11/20 2200    Elevate HOB starting at 11/20 2112    Diet NPO: NPO starting at 11/20 2112          Full Code    Alicia Grant NP  Neurocritical Care  [facility] - Neuro Critical Care    Critical condition in that Patient has a condition that poses threat to life and bodily function: L crani s/p clips     35 minutes of Critical care time was spent personally by me on the following activities: development of treatment plan with patient or surrogate and bedside caregivers, discussions with consultants, evaluation of patient's response to treatment, examination of patient, ordering and performing treatments and interventions, ordering and review of laboratory studies, ordering and review of radiographic studies, pulse oximetry, antibiotic titration if applicable, vasopressor titration if applicable, re-evaluation of patient's condition. This critical care time did not overlap with that of any other provider or involve time for any procedures. There is high probability for acute neurological change leading to clinical and possibly life-threatening deterioration requiring highest level of physician preparedness for urgent intervention.

## 2024-11-25 NOTE — PROGRESS NOTES
[facility]  Neurosurgery  Progress Note    Subjective:     History of Present Illness: Isabel Coats is a 73-year-old female past medical history of hypertension, obstructive sleep apnea on CPAP, suggestive heart failure, obesity, history of gastric cancer status post chemoradiation and DVT in currently on Eliquis. She presents to [facility] L pterional crani, 7 clips applied in proximity to large irregular L MCA bifurcation aneurysm and other adjacent small aneurysms, 1 clip applied across neck of aneurysm just proximal to anterior choroidal.  She also has a current smoker she was initially referred to Neurology for workup for dizziness. She had been complaining of vertigo multiple times a day for the past several months, associated with shortness of breath, palpitation, nausea, headache, and feeling faint. She is admitted to [facility] for a higher level of care.     Post-Op Info:  Procedure(s) (LRB):  CRANIOTOMY, WITH ANEURYSM CLIPPING W/ STEALTH (Left)  CRANIOTOMY, FOR ANEURYSM OF VERTEBROBASILAR OR CAROTID CIRCULATION (Left)  DISSECTION, NERVE, USING OPERATING MICROSCOPE (Left)   5 Days Post-Op   Interval History: 11/25: MARYANNEON. Neuro exam stable. Drain output 70cc. CTM in ICU.    Medications:  Continuous Infusions:   nicardipine  0-15 mg/hr Intravenous Continuous 12.5 mL/hr at 11/25/24 1107 2.5 mg/hr at 11/25/24 1107     Scheduled Meds:   atorvastatin  20 mg Per NG tube Daily    ceFAZolin (Ancef) IV (PEDS and ADULTS)  2 g Intravenous Q8H    citalopram  10 mg Per NG tube Daily    gabapentin  600 mg Per NG tube Q8H    heparin (porcine)  7,500 Units Subcutaneous Q8H    levetiracetam  1,000 mg Per NG tube BID    lisinopriL  20 mg Per NG tube Daily    psyllium husk (aspartame)  1 packet Per NG tube BID     PRN Meds:  Current Facility-Administered Medications:     acetaminophen, 650 mg, Per NG tube, Q4H PRN    albuterol-ipratropium, 3 mL, Nebulization, Q6H PRN    bisacodyL, 10 mg, Rectal, Daily PRN     hydrALAZINE, 10 mg, Intravenous, Q4H PRN    magnesium oxide, 800 mg, Per NG tube, PRN    magnesium oxide, 800 mg, Per NG tube, PRN    morphine, 2 mg, Intravenous, Q6H PRN    naloxone, 0.4 mg, Intravenous, PRN    ondansetron, 4 mg, Intravenous, Q12H PRN    oxyCODONE, 5 mg, Per NG tube, Q6H PRN    potassium bicarbonate, 35 mEq, Per NG tube, PRN    potassium bicarbonate, 50 mEq, Per NG tube, PRN    potassium bicarbonate, 60 mEq, Per NG tube, PRN    potassium, sodium phosphates, 2 packet, Per NG tube, PRN    potassium, sodium phosphates, 2 packet, Per NG tube, PRN    potassium, sodium phosphates, 2 packet, Per NG tube, PRN    sodium chloride 0.9%, 10 mL, Intravenous, PRN     Review of Systems  Objective:     Weight: 123.4 kg (272 lb)  Body mass index is 43.9 kg/m².  Vital Signs (Most Recent):  Temp: 97.9 °F (36.6 °C) (11/25/24 1102)  Pulse: 69 (11/25/24 1102)  Resp: (!) 25 (11/25/24 1128)  BP: (!) 166/72 (11/25/24 1102)  SpO2: 99 % (11/25/24 1102) Vital Signs (24h Range):  Temp:  [97.7 °F (36.5 °C)-98.9 °F (37.2 °C)] 97.9 °F (36.6 °C)  Pulse:  [] 69  Resp:  [14-32] 25  SpO2:  [95 %-99 %] 99 %  BP: (143-170)/(63-79) 166/72  Arterial Line BP: ()/() 130/108     Date 11/25/24 0700 - 11/26/24 0659   Shift 7625-6905 3062-8968 6098-2129 24 Hour Total   INTAKE   I.V.(mL/kg) 161.5(1.3)   161.5(1.3)   NG/   400   Shift Total(mL/kg) 561.5(4.6)   561.5(4.6)   OUTPUT   Urine(mL/kg/hr) 650   650   Shift Total(mL/kg) 650(5.3)   650(5.3)   Weight (kg) 123.4 123.4 123.4 123.4                            NG/OG Tube 11/21/24 1800 Right nostril (Active)   Placement Check placement verified by distal tube length measurement 11/25/24 1102   Tolerance no signs/symptoms of discomfort 11/25/24 1102   Securement secured to nostril center w/ adhesive device 11/25/24 1102   Clamp Status/Tolerance unclamped 11/25/24 1102   Suction Setting/Drainage Method suction at the bedside 11/25/24 1102   Insertion Site Appearance no  "redness, warmth, tenderness, skin breakdown, drainage 11/25/24 1102   Drainage None 11/25/24 1102   Flush/Irrigation flushed w/;water;no resistance met 11/25/24 1102   Feeding Type continuous;by pump 11/25/24 1102   Feeding Action feeding continued 11/25/24 1102   Current Rate (mL/hr) 50 mL/hr 11/25/24 1102   Goal Rate (mL/hr) 50 mL/hr 11/25/24 1102   Intake (mL) 200 mL 11/25/24 0902   Water Bolus (mL) 60 mL 11/22/24 1605   Formula Name Isosource 1.5 11/25/24 1102   Intake (mL) - Formula Tube Feeding 50 11/25/24 1102   Residual Amount (ml) 0 ml 11/22/24 1505            Rectal Tube 11/23/24 1200 rectal tube w/ balloon (indicate number of mLs) (Active)   Balloon Inflation Volume (mL) 45 11/25/24 1102   Reposition rectal tube advanced slightly 11/25/24 1102   Outcome gas expelled, stool evacuated 11/25/24 1102   Stool Color Brown 11/25/24 1102   Insertion Site Appearance no redness, warmth, tenderness, skin breakdown, drainage 11/25/24 1102   Flush/Irrigation flushed w/;water;no resistance met 11/25/24 1102   Intake (mL) 30 mL 11/25/24 0305   Rectal Tube Output 100 mL 11/25/24 0605       Female External Urinary Catheter w/ Suction 11/21/24 1400 (Active)   Skin no redness;no breakdown 11/25/24 1102   Tolerance no signs/symptoms of discomfort 11/25/24 1102   Suction Continuous suction at 70 mmHg 11/24/24 1905   Date of last wick change 11/24/24 11/24/24 1905   Output (mL) 650 mL 11/25/24 1102          Physical Exam         Neurosurgery Physical Exam    [id number]  Oriented to self, aphasic  PERRL  EOMI (gaze pref resolved)  FC x4 AG      Significant Labs:  Recent Labs   Lab 11/24/24  0051 11/25/24  0106    124*   * 146*   K 3.7 3.8   * 117*   CO2 21* 22*   BUN 11 11   CREATININE 0.7 0.6   CALCIUM 8.8 8.9   MG 2.3 2.4     Recent Labs   Lab 11/24/24  0051 11/25/24  0106   WBC 15.12* 13.98*   HGB 8.5* 8.6*   HCT 26.8* 28.3*    185     No results for input(s): "LABPT", "INR", "APTT" in the last 48 " hours.  Microbiology Results (last 7 days)       ** No results found for the last 168 hours. **          All pertinent labs from the last 24 hours have been reviewed.    Significant Diagnostics:  I have reviewed all pertinent imaging results/findings within the past 24 hours.  Assessment/Plan:     * Cerebral aneurysm  Isabel Coats is a 73-year-old female past medical history of hypertension, obstructive sleep apnea on CPAP, suggestive heart failure, obesity, history of gastric cancer status post chemoradiation and DVT in currently on Eliquis. She presents to [facility] L pterional crani, 7 clips applied in proximity to large irregular L MCA bifurcation aneurysm and other adjacent small aneurysms, 1 clip applied across neck of aneurysm just proximal to anterior choroidal.  She also has a current smoker she was initially referred to Neurology for workup for dizziness. She had been complaining of vertigo multiple times a day for the past several months, associated with shortness of breath, palpitation, nausea, headache, and feeling faint. She is admitted to [facility] for a higher level of care.      Imaging:  CTA Head 11/20: expected post-op changes  CTH 11/21 8 AM: right sided cerebellar IPH  CTA 11/21 10 AM: stable IPH, possible frontal contusion  CTP 11/21 4 PM: possible left anterior temporal perfusion deficit in territory of branches distal to MCA clips   MRI brain w wo 11/22: no strokes, post op changes, stable cerebellar bleed. some edema in right frontal lobe.    Plans:  -Admitted to ICU  -q1h neurocheck  -All labs and imaging were reviewed  -SBP <160  - Plan to remove the drain today  -Continue to monitor respiratory status - ABG yesterday with normal CO2 at time of AMS  -keppra 1g BID. cEEG negative  -MRI- no strokes, expected post op changes, and stable cerebellar bleed.  -continue milrinone, currently at 0.5 - seems to have mild improvement with it started. keep euvolemic to net +. F/u TCDS per NCC  -Pain  control  -PT/OT/OOB  -Continue to monitor clinically, notify NSGY immediately with any changes in neuro status     Discussed with staff Dr. Alfred Garza MD  Neurosurgery  [facility] - [facility] Critical [facility]

## 2024-11-25 NOTE — ASSESSMENT & PLAN NOTE
Isabel Coats is a 73-year-old female past medical history of hypertension, obstructive sleep apnea on CPAP, suggestive heart failure, obesity, history of gastric cancer status post chemoradiation and DVT in currently on Eliquis. She presents to [facility] L pterional crani, 7 clips applied in proximity to large irregular L MCA bifurcation aneurysm and other adjacent small aneurysms, 1 clip applied across neck of aneurysm just proximal to anterior choroidal.  She also has a current smoker she was initially referred to Neurology for workup for dizziness. She had been complaining of vertigo multiple times a day for the past several months, associated with shortness of breath, palpitation, nausea, headache, and feeling faint. She is admitted to [facility] for a higher level of care.      Imaging:  CTA Head 11/20: expected post-op changes  CTH 11/21 8 AM: right sided cerebellar IPH  CTA 11/21 10 AM: stable IPH, possible frontal contusion  CTP 11/21 4 PM: possible left anterior temporal perfusion deficit in territory of branches distal to MCA clips   MRI brain w wo 11/22: no strokes, post op changes, stable cerebellar bleed. some edema in right frontal lobe.    Plans:  -Admitted to ICU  -q1h neurocheck  -All labs and imaging were reviewed  -SBP <160  - Plan to remove the drain today  -Continue to monitor respiratory status - ABG yesterday with normal CO2 at time of AMS  -keppra 1g BID. cEEG negative  -MRI- no strokes, expected post op changes, and stable cerebellar bleed.  -continue milrinone, currently at 0.5 - seems to have mild improvement with it started. keep euvolemic to net +. F/u TCDS per [facility]  -Pain control  -PT/OT/OOB  -Continue to monitor clinically, notify NSGY immediately with any changes in neuro status     Discussed with staff Dr. Simon

## 2024-11-25 NOTE — PLAN OF CARE
"[facility] Care Plan    POC reviewed with Ca Coats  at 0300. Patient unable to verbalize understanding. Questions and concerns addressed. No acute events today. Pt progressing toward goals. Will continue to monitor. See below and flowsheets for full assessment and VS info.     - potassium replaced, phos replacing.  -room air    Is this a stroke patient? yes- Stroke booklet reviewed with patient, risk factors identified for patient and stroke booklet remains at bedside for ongoing education.     Care individualization:  flexi in use    Neuro:  [place] Coma Scale  Best Eye Response: 4-->(E4) spontaneous  Best Motor Response: 6-->(M6) obeys commands  Best Verbal Response: 3-->(V3) inappropriate words  [place] Coma Scale Score: 13  Assessment Qualifiers: patient not sedated/intubated  Pupil PERRLA: no     24 hr Temp:  [97.7 °F (36.5 °C)-99 °F (37.2 °C)]     CV:   Rhythm: normal sinus rhythm  BP goals:   SBP < 180  MAP >      Resp:           Plan: N/A    GI/:     Diet/Nutrition Received: tube feeding  Last Bowel Movement: 11/25/24  Voiding Characteristics: external catheter    Intake/Output Summary (Last 24 hours) at 11/25/2024 0640  Last data filed at 11/25/2024 0605  Gross per 24 hour   Intake 2673.52 ml   Output 4680 ml   Net -2006.48 ml     Unmeasured Output  Urine Occurrence: 1  Stool Occurrence: 2  Pad Count: 1    Labs/Accuchecks:  Recent Labs   Lab 11/25/24  0106   WBC 13.98*   RBC 2.71*   HGB 8.6*   HCT 28.3*         Recent Labs   Lab 11/25/24  0106   *   K 3.8   CO2 22*   *   BUN 11   CREATININE 0.6   ALKPHOS 82   ALT 15   AST 26   BILITOT 0.5      Recent Labs   Lab 11/21/24  0904   INR 1.0   APTT 25.4    No results for input(s): "CPK", "CPKMB", "TROPONINI", "MB" in the last 168 hours.    Electrolytes: Electrolytes replaced  Accuchecks: none    Gtts:   0.9% NaCl   Intravenous Continuous 50 mL/hr at 11/25/24 0605 Rate Verify at 11/25/24 0605       LDA/Wounds:    Domenico Risk " Assessment  Sensory Perception: 3-->slightly limited  Moisture: 3-->occasionally moist  Activity: 1-->bedfast  Mobility: 3-->slightly limited  Nutrition: 3-->adequate  Friction and Shear: 1-->problem  Domenico Score: 14  Is your domenico score 12 or less? no          Restraints:   Restraint Order  Length of Order: Order good for next 24 hours or when removed.  Date that the current order will : 24  Time that the current order will : 1500  Order Upon Application: Yes    [facility]

## 2024-11-26 LAB
ALBUMIN SERPL BCP-MCNC: 2.8 G/DL (ref 3.5–5.2)
ALBUMIN SERPL BCP-MCNC: 2.8 G/DL (ref 3.5–5.2)
ALP SERPL-CCNC: 89 U/L (ref 40–150)
ALP SERPL-CCNC: 89 U/L (ref 40–150)
ALT SERPL W/O P-5'-P-CCNC: 13 U/L (ref 10–44)
ALT SERPL W/O P-5'-P-CCNC: 13 U/L (ref 10–44)
ANION GAP SERPL CALC-SCNC: 9 MMOL/L (ref 8–16)
ANION GAP SERPL CALC-SCNC: 9 MMOL/L (ref 8–16)
AST SERPL-CCNC: 24 U/L (ref 10–40)
AST SERPL-CCNC: 24 U/L (ref 10–40)
BASOPHILS # BLD AUTO: 0.03 K/UL (ref 0–0.2)
BASOPHILS NFR BLD: 0.2 % (ref 0–1.9)
BILIRUB SERPL-MCNC: 0.6 MG/DL (ref 0.1–1)
BILIRUB SERPL-MCNC: 0.6 MG/DL (ref 0.1–1)
BUN SERPL-MCNC: 13 MG/DL (ref 8–23)
BUN SERPL-MCNC: 13 MG/DL (ref 8–23)
CALCIUM SERPL-MCNC: 8.8 MG/DL (ref 8.7–10.5)
CALCIUM SERPL-MCNC: 8.8 MG/DL (ref 8.7–10.5)
CHLORIDE SERPL-SCNC: 116 MMOL/L (ref 95–110)
CHLORIDE SERPL-SCNC: 116 MMOL/L (ref 95–110)
CO2 SERPL-SCNC: 22 MMOL/L (ref 23–29)
CO2 SERPL-SCNC: 22 MMOL/L (ref 23–29)
CREAT SERPL-MCNC: 0.6 MG/DL (ref 0.5–1.4)
CREAT SERPL-MCNC: 0.6 MG/DL (ref 0.5–1.4)
DIFFERENTIAL METHOD BLD: ABNORMAL
EOSINOPHIL # BLD AUTO: 0.1 K/UL (ref 0–0.5)
EOSINOPHIL NFR BLD: 0.6 % (ref 0–8)
ERYTHROCYTE [DISTWIDTH] IN BLOOD BY AUTOMATED COUNT: 13.9 % (ref 11.5–14.5)
EST. GFR  (NO RACE VARIABLE): >60 ML/MIN/1.73 M^2
EST. GFR  (NO RACE VARIABLE): >60 ML/MIN/1.73 M^2
GLUCOSE SERPL-MCNC: 101 MG/DL (ref 70–110)
GLUCOSE SERPL-MCNC: 101 MG/DL (ref 70–110)
HCT VFR BLD AUTO: 31.7 % (ref 37–48.5)
HGB BLD-MCNC: 9.8 G/DL (ref 12–16)
IMM GRANULOCYTES # BLD AUTO: 0.12 K/UL (ref 0–0.04)
IMM GRANULOCYTES NFR BLD AUTO: 0.8 % (ref 0–0.5)
LYMPHOCYTES # BLD AUTO: 2.3 K/UL (ref 1–4.8)
LYMPHOCYTES NFR BLD: 14.5 % (ref 18–48)
MAGNESIUM SERPL-MCNC: 2.4 MG/DL (ref 1.6–2.6)
MCH RBC QN AUTO: 32.3 PG (ref 27–31)
MCHC RBC AUTO-ENTMCNC: 30.9 G/DL (ref 32–36)
MCV RBC AUTO: 105 FL (ref 82–98)
MONOCYTES # BLD AUTO: 1.6 K/UL (ref 0.3–1)
MONOCYTES NFR BLD: 9.9 % (ref 4–15)
NEUTROPHILS # BLD AUTO: 11.8 K/UL (ref 1.8–7.7)
NEUTROPHILS NFR BLD: 74 % (ref 38–73)
NRBC BLD-RTO: 0 /100 WBC
PHOSPHATE SERPL-MCNC: 2.7 MG/DL (ref 2.7–4.5)
PLATELET # BLD AUTO: 199 K/UL (ref 150–450)
PMV BLD AUTO: 11.1 FL (ref 9.2–12.9)
POTASSIUM SERPL-SCNC: 4.1 MMOL/L (ref 3.5–5.1)
POTASSIUM SERPL-SCNC: 4.1 MMOL/L (ref 3.5–5.1)
PROT SERPL-MCNC: 6.2 G/DL (ref 6–8.4)
PROT SERPL-MCNC: 6.2 G/DL (ref 6–8.4)
RBC # BLD AUTO: 3.03 M/UL (ref 4–5.4)
SODIUM SERPL-SCNC: 147 MMOL/L (ref 136–145)
SODIUM SERPL-SCNC: 147 MMOL/L (ref 136–145)
WBC # BLD AUTO: 15.87 K/UL (ref 3.9–12.7)

## 2024-11-26 PROCEDURE — 11000001 HC ACUTE MED/SURG PRIVATE ROOM: Mod: HCNC

## 2024-11-26 PROCEDURE — 25000003 PHARM REV CODE 250: Mod: HCNC | Performed by: NURSE PRACTITIONER

## 2024-11-26 PROCEDURE — 25000003 PHARM REV CODE 250: Mod: HCNC | Performed by: PHYSICIAN ASSISTANT

## 2024-11-26 PROCEDURE — 84100 ASSAY OF PHOSPHORUS: CPT | Mod: HCNC | Performed by: NURSE PRACTITIONER

## 2024-11-26 PROCEDURE — 63600175 PHARM REV CODE 636 W HCPCS: Mod: JZ,JG,HCNC | Performed by: PHYSICIAN ASSISTANT

## 2024-11-26 PROCEDURE — 92526 ORAL FUNCTION THERAPY: CPT | Mod: HCNC

## 2024-11-26 PROCEDURE — 92507 TX SP LANG VOICE COMM INDIV: CPT | Mod: HCNC

## 2024-11-26 PROCEDURE — 63600175 PHARM REV CODE 636 W HCPCS: Mod: HCNC | Performed by: NURSE PRACTITIONER

## 2024-11-26 PROCEDURE — 97535 SELF CARE MNGMENT TRAINING: CPT | Mod: HCNC

## 2024-11-26 PROCEDURE — 25000003 PHARM REV CODE 250: Mod: HCNC | Performed by: PSYCHIATRY & NEUROLOGY

## 2024-11-26 PROCEDURE — 85025 COMPLETE CBC W/AUTO DIFF WBC: CPT | Mod: HCNC | Performed by: NURSE PRACTITIONER

## 2024-11-26 PROCEDURE — 80053 COMPREHEN METABOLIC PANEL: CPT | Mod: HCNC

## 2024-11-26 PROCEDURE — 99233 SBSQ HOSP IP/OBS HIGH 50: CPT | Mod: HCNC,,, | Performed by: PHYSICIAN ASSISTANT

## 2024-11-26 PROCEDURE — 94761 N-INVAS EAR/PLS OXIMETRY MLT: CPT | Mod: HCNC

## 2024-11-26 PROCEDURE — 83735 ASSAY OF MAGNESIUM: CPT | Mod: HCNC | Performed by: NURSE PRACTITIONER

## 2024-11-26 PROCEDURE — 25000003 PHARM REV CODE 250: Mod: HCNC

## 2024-11-26 RX ORDER — LABETALOL HCL 20 MG/4 ML
10 SYRINGE (ML) INTRAVENOUS EVERY 4 HOURS PRN
Status: DISCONTINUED | OUTPATIENT
Start: 2024-11-26 | End: 2024-12-16 | Stop reason: HOSPADM

## 2024-11-26 RX ORDER — HYDRALAZINE HYDROCHLORIDE 25 MG/1
50 TABLET, FILM COATED ORAL EVERY 8 HOURS
Status: DISCONTINUED | OUTPATIENT
Start: 2024-11-26 | End: 2024-11-29

## 2024-11-26 RX ADMIN — LEVETIRACETAM 1000 MG: 500 SOLUTION ORAL at 10:11

## 2024-11-26 RX ADMIN — GABAPENTIN 600 MG: 250 SOLUTION ORAL at 02:11

## 2024-11-26 RX ADMIN — LEVETIRACETAM 1000 MG: 500 SOLUTION ORAL at 09:11

## 2024-11-26 RX ADMIN — GABAPENTIN 600 MG: 250 SOLUTION ORAL at 05:11

## 2024-11-26 RX ADMIN — HYDRALAZINE HYDROCHLORIDE 50 MG: 25 TABLET ORAL at 09:11

## 2024-11-26 RX ADMIN — LABETALOL HYDROCHLORIDE 10 MG: 5 INJECTION, SOLUTION INTRAVENOUS at 01:11

## 2024-11-26 RX ADMIN — NICARDIPINE HYDROCHLORIDE 10 MG/HR: 0.2 INJECTION, SOLUTION INTRAVENOUS at 02:11

## 2024-11-26 RX ADMIN — CITALOPRAM HYDROBROMIDE 10 MG: 10 TABLET ORAL at 10:11

## 2024-11-26 RX ADMIN — HYDRALAZINE HYDROCHLORIDE 10 MG: 20 INJECTION, SOLUTION INTRAMUSCULAR; INTRAVENOUS at 11:11

## 2024-11-26 RX ADMIN — GABAPENTIN 600 MG: 250 SOLUTION ORAL at 09:11

## 2024-11-26 RX ADMIN — ATORVASTATIN CALCIUM 20 MG: 20 TABLET, FILM COATED ORAL at 10:11

## 2024-11-26 RX ADMIN — HEPARIN SODIUM 7500 UNITS: 5000 INJECTION INTRAVENOUS; SUBCUTANEOUS at 09:11

## 2024-11-26 RX ADMIN — HYDRALAZINE HYDROCHLORIDE 50 MG: 25 TABLET ORAL at 12:11

## 2024-11-26 RX ADMIN — POTASSIUM & SODIUM PHOSPHATES POWDER PACK 280-160-250 MG 2 PACKET: 280-160-250 PACK at 05:11

## 2024-11-26 RX ADMIN — LISINOPRIL 40 MG: 20 TABLET ORAL at 10:11

## 2024-11-26 RX ADMIN — HEPARIN SODIUM 7500 UNITS: 5000 INJECTION INTRAVENOUS; SUBCUTANEOUS at 02:11

## 2024-11-26 RX ADMIN — HEPARIN SODIUM 7500 UNITS: 5000 INJECTION INTRAVENOUS; SUBCUTANEOUS at 05:11

## 2024-11-26 RX ADMIN — NICARDIPINE HYDROCHLORIDE 7.5 MG/HR: 0.2 INJECTION, SOLUTION INTRAVENOUS at 06:11

## 2024-11-26 NOTE — PT/OT/SLP PROGRESS
Physical Therapy      Patient Name:  Ca Coats   MRN:  [id number]    Patient not seen today secondary to hold x2 attempts 2/2 hypertension without any stimulation. Additionally, staff at bedside for TCD.  Will follow-up according to POC pending medical stability.

## 2024-11-26 NOTE — PT/OT/SLP PROGRESS
Occupational Therapy      Patient Name:  Ca Coats   MRN:  [id number]    Patient not seen  for OT session today secondary to nursing hold x2 attempts 2/2 hypertension without any stimulation. Additionally, staff at bedside for TCD.  Will follow-up according to POC pending medical stability.        11/26/2024

## 2024-11-26 NOTE — PROGRESS NOTES
[facility]  Neurosurgery  Progress Note    Subjective:     History of Present Illness: Isabel Coats is a 73-year-old female past medical history of hypertension, obstructive sleep apnea on CPAP, suggestive heart failure, obesity, history of gastric cancer status post chemoradiation and DVT in currently on Eliquis. She presents to [facility] pterional crani, 7 clips applied in proximity to large irregular L MCA bifurcation aneurysm and other adjacent small aneurysms, 1 clip applied across neck of aneurysm just proximal to anterior choroidal.  She also has a current smoker she was initially referred to Neurology for workup for dizziness. She had been complaining of vertigo multiple times a day for the past several months, associated with shortness of breath, palpitation, nausea, headache, and feeling faint. She is admitted to [facility] for a higher level of care.     Post-Op Info:  Procedure(s) (LRB):  CRANIOTOMY, WITH ANEURYSM CLIPPING W/ STEALTH (Left)  CRANIOTOMY, FOR ANEURYSM OF VERTEBROBASILAR OR CAROTID CIRCULATION (Left)  DISSECTION, NERVE, USING OPERATING MICROSCOPE (Left)   6 Days Post-Op   Interval History: 11/26: ZIA. Patient states name, oriented to hospital with choices. Able to count fingers, but still severely aphasic overall. NGT in place, still npo per speech recs. Stable for transfer to NSGY floor.     Medications:  Continuous Infusions:  Scheduled Meds:   atorvastatin  20 mg Per NG tube Daily    citalopram  10 mg Per NG tube Daily    gabapentin  600 mg Per NG tube Q8H    heparin (porcine)  7,500 Units Subcutaneous Q8H    levetiracetam  1,000 mg Per NG tube BID    lisinopriL  40 mg Per NG tube Daily     PRN Meds:  Current Facility-Administered Medications:     acetaminophen, 650 mg, Per NG tube, Q4H PRN    bisacodyL, 10 mg, Rectal, Daily PRN    hydrALAZINE, 10 mg, Intravenous, Q4H PRN    magnesium oxide, 800 mg, Per NG tube, PRN    magnesium oxide, 800 mg, Per NG tube, PRN    morphine,  2 mg, Intravenous, Q6H PRN    naloxone, 0.4 mg, Intravenous, PRN    ondansetron, 4 mg, Intravenous, Q12H PRN    oxyCODONE, 5 mg, Per NG tube, Q6H PRN    potassium bicarbonate, 35 mEq, Per NG tube, PRN    potassium bicarbonate, 50 mEq, Per NG tube, PRN    potassium bicarbonate, 60 mEq, Per NG tube, PRN    potassium, sodium phosphates, 2 packet, Per NG tube, PRN    potassium, sodium phosphates, 2 packet, Per NG tube, PRN    potassium, sodium phosphates, 2 packet, Per NG tube, PRN    sodium chloride 0.9%, 10 mL, Intravenous, PRN     Review of Systems  Objective:     Weight: 123.4 kg (272 lb)  Body mass index is 43.9 kg/m².  Vital Signs (Most Recent):  Temp: 98.7 °F (37.1 °C) (11/26/24 0701)  Pulse: 69 (11/26/24 0701)  Resp: 18 (11/26/24 0701)  BP: (!) 144/65 (11/26/24 0701)  SpO2: 96 % (11/26/24 0701) Vital Signs (24h Range):  Temp:  [97.9 °F (36.6 °C)-98.7 °F (37.1 °C)] 98.7 °F (37.1 °C)  Pulse:  [62-92] 69  Resp:  [17-25] 18  SpO2:  [93 %-99 %] 96 %  BP: (141-173)/(63-76) 144/65  Arterial Line BP: (130-296)/(108-296) 278/278     Date 11/26/24 0700 - 11/27/24 0659   Shift 8876-9927 8196-9394 6058-0762 24 Hour Total   INTAKE   I.V.(mL/kg) 29.3(0.2)   29.3(0.2)   NG/GT 50   50   Shift Total(mL/kg) 79.3(0.6)   79.3(0.6)   OUTPUT   Shift Total(mL/kg)       Weight (kg) 123.4 123.4 123.4 123.4                            NG/OG Tube 11/21/24 1800 Right nostril (Active)   Placement Check placement verified by distal tube length measurement 11/26/24 0601   Tolerance no signs/symptoms of discomfort 11/26/24 0601   Securement secured to commercial device 11/26/24 0601   Clamp Status/Tolerance unclamped 11/26/24 0601   Suction Setting/Drainage Method suction at the bedside 11/26/24 0601   Insertion Site Appearance no redness, warmth, tenderness, skin breakdown, drainage 11/26/24 0601   Drainage None 11/26/24 0601   Flush/Irrigation flushed w/;water;no resistance met 11/26/24 0601   Feeding Type continuous;by pump 11/26/24 0601  "  Feeding Action feeding continued 11/26/24 0601   Current Rate (mL/hr) 50 mL/hr 11/26/24 0501   Goal Rate (mL/hr) 50 mL/hr 11/26/24 0501   Intake (mL) 250 mL 11/25/24 2101   Water Bolus (mL) 60 mL 11/22/24 1605   Formula Name Isosource 1.5 11/26/24 0601   Intake (mL) - Formula Tube Feeding 50 11/26/24 0701   Residual Amount (ml) 0 ml 11/22/24 1505       Female External Urinary Catheter w/ Suction 11/21/24 1400 (Active)   Skin no redness;no breakdown 11/26/24 0601   Tolerance no signs/symptoms of discomfort 11/26/24 0601   Suction Continuous suction at 60 mmHg 11/26/24 0301   Date of last wick change 11/25/24 11/25/24 1901   Output (mL) 150 mL 11/26/24 0601          Physical Exam         Neurosurgery Physical Exam    [id number]  States name, oriented to hospital with choices, not oriented to year. remains aphasic  PERRL  EOMI (gaze pref resolved)  FC x4 AG       Significant Labs:  Recent Labs   Lab 11/25/24  0106 11/26/24  0100   * 101  101   * 147*  147*   K 3.8 4.1  4.1   * 116*  116*   CO2 22* 22*  22*   BUN 11 13  13   CREATININE 0.6 0.6  0.6   CALCIUM 8.9 8.8  8.8   MG 2.4 2.4     Recent Labs   Lab 11/25/24  0106 11/26/24  0100   WBC 13.98* 15.87*   HGB 8.6* 9.8*   HCT 28.3* 31.7*    199     No results for input(s): "LABPT", "INR", "APTT" in the last 48 hours.  Microbiology Results (last 7 days)       ** No results found for the last 168 hours. **          All pertinent labs from the last 24 hours have been reviewed.    Significant Diagnostics:  CT: No results found in the last 24 hours.  MRI: No results found in the last 24 hours.  Assessment/Plan:     * Cerebral aneurysm  Isabel Coats is a 73-year-old female past medical history of hypertension, obstructive sleep apnea on CPAP, suggestive heart failure, obesity, history of gastric cancer status post chemoradiation and DVT in currently on Eliquis. She presents to [facility] s/p L pterional crani on 11/20 with 7 clips applied in " proximity to large irregular L MCA bifurcation aneurysm and other adjacent small aneurysms, 1 clip applied across neck of aneurysm just proximal to anterior choroidal.  After surgery she seemed to be gradually waking up appropriately overnight, then AM of POD1 around shift change had an unexplained decline in exam during which stat CT showed IPH in the right side of her cerebellum. She became less responsive as well as developed a L gaze preference and dense aphasia. She had a perfusion scan with questionable perfusion deficit to the left anterior temporal lobe and milrinone was started with seemingly some clinical improvement. Now on POD5 has been weaned off milrinone, gaze preference resolved, following commands x4 antigravity, able to state name but still remains aphasic.    Imaging:  CTA Head 11/20: expected post-op changes  CTH 11/21 8 AM: right sided cerebellar IPH  CTA 11/21 10 AM: stable IPH, possible frontal contusion  CTP 11/21 4 PM: possible left anterior temporal perfusion deficit in territory of branches distal to MCA clips   MRI brain w wo 11/22: no strokes, post op changes, stable cerebellar bleed. some edema in right frontal lobe.    Plans:  -Admitted to ICU, stable for transfer to NS floor  -q4h neurochecks/vitals  -SBP <160  -drain removed 11/25  -Continue to monitor respiratory status, on nasal cannula  -keppra 1g BID initially postop, now 500 BID. cEEG negative during initial AMS POD1  -now has been weaned off milrinone, TCDs wnl. Keep euvolemic   -PT/OT/OOB  -SLP - currently npo with NGT  -Continue to monitor clinically, notify NSGY immediately with any changes in neuro status   -SQH DVT ppx    Dispo: TTF NSGY    Discussed with staff Dr. Alfred Schultz MD  Neurosurgery  [facility] - [facility]

## 2024-11-26 NOTE — ASSESSMENT & PLAN NOTE
Ms. 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 CTA was negative for new occlusion, EEG was negative for epileptiform activity, and MRI revealed edema in the L anterior temporal lobe and posterior inferior frontal lobes but without changes that would explain patient's clinical exam. Has been maintained on milrinone/nicardipine to maintain SBP<120, MAP>65. After discussion with NSGY staff, we will liberalize MAP goal and follow neuro exam to monitor for improvement with higher systolic pressures.    - Admit to NCC   - s/p L pterional crani, 7 clips applied in proximity to large irregular L MCA bifurcation aneurysm and other adjacent small aneurysms, 1 clip applied across neck of aneurysm just proximal to anterior choroidal.   - Na goal euna  - Q 1h vitals  - Q 1h neuro checks  - Keppra 1g BID  - s/p dex 4 q 6 x 3 doses  - post-op ancef x5d  11/26/2024: -160  Restart home gabapentin and citalopram  Pending SLP eval   q6 hours

## 2024-11-26 NOTE — PLAN OF CARE
"[facility] Care Plan    POC reviewed with Ca Coats at 0300. Patient confused and unable to verbalize understanding. Questions and concerns addressed. No acute events today. Pt progressing toward goals. Will continue to monitor. See below and flowsheets for full assessment and VS info.     - Phos replaced (2.7)  - Cardene gtt titrated for SBP goal <160  - TF continuing at goal of 50 cc/hr  - Linens changed         Is this a stroke patient? no    Neuro:  Lytle Coma Scale  Best Eye Response: 3-->(E3) to speech  Best Motor Response: 6-->(M6) obeys commands  Best Verbal Response: 3-->(V3) inappropriate words  Lytle Coma Scale Score: 12  Assessment Qualifiers: patient not sedated/intubated  Pupil PERRLA: no     24 hr Temp:  [97.9 °F (36.6 °C)-98.9 °F (37.2 °C)]     CV:   Rhythm: normal sinus rhythm  BP goals:   SBP < 160  MAP > 65    Resp:         Plan: N/A    GI/:     Diet/Nutrition Received: tube feeding  Last Bowel Movement: 11/23/24  Voiding Characteristics: external catheter    Intake/Output Summary (Last 24 hours) at 11/26/2024 0450  Last data filed at 11/26/2024 0301  Gross per 24 hour   Intake 2667.71 ml   Output 1810 ml   Net 857.71 ml     Unmeasured Output  Urine Occurrence: 1  Stool Occurrence: 2  Pad Count: 1    Labs/Accuchecks:  Recent Labs   Lab 11/26/24  0100   WBC 15.87*   RBC 3.03*   HGB 9.8*   HCT 31.7*         Recent Labs   Lab 11/26/24  0100   *  147*   K 4.1  4.1   CO2 22*  22*   *  116*   BUN 13  13   CREATININE 0.6  0.6   ALKPHOS 89  89   ALT 13  13   AST 24  24   BILITOT 0.6  0.6      Recent Labs   Lab 11/21/24  0904   INR 1.0   APTT 25.4    No results for input(s): "CPK", "CPKMB", "TROPONINI", "MB" in the last 168 hours.    Electrolytes: Electrolytes replaced  Accuchecks: none    Gtts:   nicardipine  0-15 mg/hr Intravenous Continuous 50 mL/hr at 11/26/24 0301 10 mg/hr at 11/26/24 0301       LDA/Wounds:    Domenico Risk Assessment  Sensory Perception: " 3-->slightly limited  Moisture: 3-->occasionally moist  Activity: 1-->bedfast  Mobility: 2-->very limited  Nutrition: 3-->adequate  Friction and Shear: 2-->potential problem  Domenico Score: 14  Is your domenico score 12 or less? no      Restraints:   Restraint Order  Length of Order: Order good for next 24 hours or when removed.  Date that the current order will : 24  Time that the current order will : 1238  Order Upon Application: Yes    [facility]

## 2024-11-26 NOTE — ASSESSMENT & PLAN NOTE
Isabel Coats is a 73-year-old female past medical history of hypertension, obstructive sleep apnea on CPAP, suggestive heart failure, obesity, history of gastric cancer status post chemoradiation and DVT in currently on Eliquis. She presents to [facility] s/p L pterional crani on 11/20 with 7 clips applied in proximity to large irregular L MCA bifurcation aneurysm and other adjacent small aneurysms, 1 clip applied across neck of aneurysm just proximal to anterior choroidal.  After surgery she seemed to be gradually waking up appropriately overnight, then AM of POD1 around shift change had an unexplained decline in exam during which stat CT showed IPH in the right side of her cerebellum. She became less responsive as well as developed a L gaze preference and dense aphasia. She had a perfusion scan with questionable perfusion deficit to the left anterior temporal lobe and milrinone was started with seemingly some clinical improvement. Now on POD5 has been weaned off milrinone, gaze preference resolved, following commands x4 antigravity, able to state name but still remains aphasic.    Imaging:  CTA Head 11/20: expected post-op changes  CTH 11/21 8 AM: right sided cerebellar IPH  CTA 11/21 10 AM: stable IPH, possible frontal contusion  CTP 11/21 4 PM: possible left anterior temporal perfusion deficit in territory of branches distal to MCA clips   MRI brain w wo 11/22: no strokes, post op changes, stable cerebellar bleed. some edema in right frontal lobe.    Plans:  -Admitted to ICU, stable for transfer to NSGY floor  -q4h neurochecks/vitals  -SBP <160  -drain removed 11/25  -Continue to monitor respiratory status, on nasal cannula  -keppra 1g BID initially postop, now 500 BID. cEEG negative during initial AMS POD1  -now has been weaned off milrinone, TCDs wnl. Keep euvolemic   -PT/OT/OOB  -SLP - currently npo with NGT  -Continue to monitor clinically, notify NSGY immediately with any changes in neuro status   -SQH DVT  ppx    Dispo: TTF [facility]    Discussed with staff Dr. Simon

## 2024-11-26 NOTE — PLAN OF CARE
SW attempted to contact Pt's daughter, Lillian, for SNF choices. SW left a message for a call back. Family is not present in the room. SW will attempt again at a later time.    ESTRELLA Pitts LMSW  [facility]  [id number]

## 2024-11-26 NOTE — PLAN OF CARE
11/26/24 1359   Post-Acute Status   Post-Acute Authorization Placement   Post-Acute Placement Status Referrals Sent   Coverage [facility] Medicare   Patient choice form signed by patient/caregiver List with quality metrics by geographic area provided   Discharge Delays (!) Change in Medical Condition   Discharge Plan   Discharge Plan A Skilled Nursing Facility   Discharge Plan B Home Health     Met with Pt's dtrLillian, to review discharge recommendation of skilled nursing placement and she is agreeable to plan    Patient/family provided list of facilities in-network with patient's payor plan. Providers that are owned, operated, or affiliated with [facility] are included on the list.     Notified that referral sent to below listed facilities from in-network list based on proximity to home/family support:   [facility]  2.   [facility]  3.   [facility]  4.   [facility]    Patient/family instructed to identify preference.    Preferred Facility: (if more than 1, listed in order of descending preference)  [facility]    If an additional preferred facility not listed above is identified, additional referral to be sent. If above facilities unable to accept, will send additional referrals to in-network providers.     ESTRELLA Pitts, ABIGAIL  [facility]  [id number]

## 2024-11-26 NOTE — SUBJECTIVE & OBJECTIVE
Interval History: 11/26: MARYANNEON. Patient states name, oriented to hospital with choices. Able to count fingers, but still severely aphasic overall. NGT in place, still npo per speech recs. Stable for transfer to NSGY floor.     Medications:  Continuous Infusions:  Scheduled Meds:   atorvastatin  20 mg Per NG tube Daily    citalopram  10 mg Per NG tube Daily    gabapentin  600 mg Per NG tube Q8H    heparin (porcine)  7,500 Units Subcutaneous Q8H    levetiracetam  1,000 mg Per NG tube BID    lisinopriL  40 mg Per NG tube Daily     PRN Meds:  Current Facility-Administered Medications:     acetaminophen, 650 mg, Per NG tube, Q4H PRN    bisacodyL, 10 mg, Rectal, Daily PRN    hydrALAZINE, 10 mg, Intravenous, Q4H PRN    magnesium oxide, 800 mg, Per NG tube, PRN    magnesium oxide, 800 mg, Per NG tube, PRN    morphine, 2 mg, Intravenous, Q6H PRN    naloxone, 0.4 mg, Intravenous, PRN    ondansetron, 4 mg, Intravenous, Q12H PRN    oxyCODONE, 5 mg, Per NG tube, Q6H PRN    potassium bicarbonate, 35 mEq, Per NG tube, PRN    potassium bicarbonate, 50 mEq, Per NG tube, PRN    potassium bicarbonate, 60 mEq, Per NG tube, PRN    potassium, sodium phosphates, 2 packet, Per NG tube, PRN    potassium, sodium phosphates, 2 packet, Per NG tube, PRN    potassium, sodium phosphates, 2 packet, Per NG tube, PRN    sodium chloride 0.9%, 10 mL, Intravenous, PRN     Review of Systems  Objective:     Weight: 123.4 kg (272 lb)  Body mass index is 43.9 kg/m².  Vital Signs (Most Recent):  Temp: 98.7 °F (37.1 °C) (11/26/24 0701)  Pulse: 69 (11/26/24 0701)  Resp: 18 (11/26/24 0701)  BP: (!) 144/65 (11/26/24 0701)  SpO2: 96 % (11/26/24 0701) Vital Signs (24h Range):  Temp:  [97.9 °F (36.6 °C)-98.7 °F (37.1 °C)] 98.7 °F (37.1 °C)  Pulse:  [62-92] 69  Resp:  [17-25] 18  SpO2:  [93 %-99 %] 96 %  BP: (141-173)/(63-76) 144/65  Arterial Line BP: (130-296)/(108-296) 278/278     Date 11/26/24 0700 - 11/27/24 0659   Shift 2890-8732 3405-8631 1181-2983 24 Hour  Total   INTAKE   I.V.(mL/kg) 29.3(0.2)   29.3(0.2)   NG/GT 50   50   Shift Total(mL/kg) 79.3(0.6)   79.3(0.6)   OUTPUT   Shift Total(mL/kg)       Weight (kg) 123.4 123.4 123.4 123.4                            NG/OG Tube 11/21/24 1800 Right nostril (Active)   Placement Check placement verified by distal tube length measurement 11/26/24 0601   Tolerance no signs/symptoms of discomfort 11/26/24 0601   Securement secured to commercial device 11/26/24 0601   Clamp Status/Tolerance unclamped 11/26/24 0601   Suction Setting/Drainage Method suction at the bedside 11/26/24 0601   Insertion Site Appearance no redness, warmth, tenderness, skin breakdown, drainage 11/26/24 0601   Drainage None 11/26/24 0601   Flush/Irrigation flushed w/;water;no resistance met 11/26/24 0601   Feeding Type continuous;by pump 11/26/24 0601   Feeding Action feeding continued 11/26/24 0601   Current Rate (mL/hr) 50 mL/hr 11/26/24 0501   Goal Rate (mL/hr) 50 mL/hr 11/26/24 0501   Intake (mL) 250 mL 11/25/24 2101   Water Bolus (mL) 60 mL 11/22/24 1605   Formula Name Isosource 1.5 11/26/24 0601   Intake (mL) - Formula Tube Feeding 50 11/26/24 0701   Residual Amount (ml) 0 ml 11/22/24 1505       Female External Urinary Catheter w/ Suction 11/21/24 1400 (Active)   Skin no redness;no breakdown 11/26/24 0601   Tolerance no signs/symptoms of discomfort 11/26/24 0601   Suction Continuous suction at 60 mmHg 11/26/24 0301   Date of last wick change 11/25/24 11/25/24 1901   Output (mL) 150 mL 11/26/24 0601          Physical Exam         Neurosurgery Physical Exam    [id number]  States name, oriented to hospital with choices, not oriented to year. remains aphasic  PERRL  EOMI (gaze pref resolved)  FC x4 AG       Significant Labs:  Recent Labs   Lab 11/25/24  0106 11/26/24  0100   * 101  101   * 147*  147*   K 3.8 4.1  4.1   * 116*  116*   CO2 22* 22*  22*   BUN 11 13  13   CREATININE 0.6 0.6  0.6   CALCIUM 8.9 8.8  8.8   MG 2.4 2.4  "    Recent Labs   Lab 11/25/24  0106 11/26/24  0100   WBC 13.98* 15.87*   HGB 8.6* 9.8*   HCT 28.3* 31.7*    199     No results for input(s): "LABPT", "INR", "APTT" in the last 48 hours.  Microbiology Results (last 7 days)       ** No results found for the last 168 hours. **          All pertinent labs from the last 24 hours have been reviewed.    Significant Diagnostics:  CT: No results found in the last 24 hours.  MRI: No results found in the last 24 hours.  "

## 2024-11-26 NOTE — PROGRESS NOTES
[facility] - Neuro Critical Care  Neurocritical Care  Progress Note    Admit Date: 11/20/2024  Service Date: 11/26/2024  Length of Stay: 6    Subjective:     Chief Complaint: Cerebral aneurysm    History of Present Illness: Isabel Coats is a 73-year-old female past medical history of hypertension, obstructive sleep apnea on CPAP, suggestive heart failure, obesity, history of gastric cancer status post chemoradiation and DVT in currently on Eliquis. She presents to [facility] L pterional crani, 7 clips applied in proximity to large irregular L MCA bifurcation aneurysm and other adjacent small aneurysms, 1 clip applied across neck of aneurysm just proximal to anterior choroidal.  She also has a current smoker she was initially referred to Neurology for workup for dizziness. She had been complaining of vertigo multiple times a day for the past several months, associated with shortness of breath, palpitation, nausea, headache, and feeling faint. She is admitted to [facility] for a higher level of care.     Hospital Course: 11/21/2024 noted to have intermittentleft facial twitching and decreased mental status.   11/22/2024 left gaze preference. CT perfusion with decreased flow left insula  11/23/2024 frequent short runs of Vtach while on milrinone. Neuro exam stable, without major improvement. MRI yesterday with edema in the L anterior temporal lobe and L posterior inferior frontal lobe with stable IPH of the frontal lobe and R superior cerebellum. After discussion with NSGY, plan to liberalize MAP goal to 100  11/24/2024 NAEO. Liberalize   11/25/2024: SBP goal 100-160, add psyllium, restart home gabapentin and citalopram, d/c continous IVF, increase sq heparin to weight base, add cardene gtt, add Lisinopril,    Interval History: See hospital course.     Review of Systems: Unable to obtain a complete ROS due to level of consciousness.     Vitals:   Temp: 98.9 °F (37.2 °C)  Pulse: 83  Rhythm: normal sinus rhythm  BP: (!)  162/71  MAP (mmHg): 102  Resp: 20  SpO2: 97 %    Temp  Min: 98.2 °F (36.8 °C)  Max: 98.9 °F (37.2 °C)  Pulse  Min: 62  Max: 92  BP  Min: 135/60  Max: 173/71  MAP (mmHg)  Min: 87  Max: 109  Resp  Min: 17  Max: 25  SpO2  Min: 93 %  Max: 98 %    11/25 0701 - 11/26 0700  In: 2656.9 [I.V.:956.9]  Out: 1600 [Urine:1600]   Unmeasured Output  Urine Occurrence: 1  Stool Occurrence: 2  Pad Count: 1     Examination:   Constitutional: Obese. No apparent distress.   Eyes: Conjunctiva clear, anicteric. Lids no lesions.  Head/Ears/Nose/Mouth/Throat/Neck: Moist mucous membranes. External ears, nose atraumatic.   Cardiovascular: Regular rhythm. No murmurs. No leg edema.  Respiratory: Comfortable respirations. Clear to auscultation.  Gastrointestinal: No hernia. Soft, nondistended, nontender. + bowel sounds.    Neurologic:  -GCS [id number]  -Alert. Oriented to person and place.  Disoriented to time and situation. Dysarthric. Follows one step commands.  -Cranial nerves grossly intact.  -Motor HURT, generalized weakness but non focal  -Sensation intact      Medications:   Continuous Scheduledatorvastatin, 20 mg, Daily  citalopram, 10 mg, Daily  gabapentin, 600 mg, Q8H  heparin (porcine), 7,500 Units, Q8H  hydrALAZINE, 50 mg, Q8H  levetiracetam, 1,000 mg, BID  lisinopriL, 40 mg, Daily    PRNacetaminophen, 650 mg, Q4H PRN  bisacodyL, 10 mg, Daily PRN  hydrALAZINE, 10 mg, Q4H PRN  magnesium oxide, 800 mg, PRN  magnesium oxide, 800 mg, PRN  morphine, 2 mg, Q6H PRN  naloxone, 0.4 mg, PRN  ondansetron, 4 mg, Q12H PRN  oxyCODONE, 5 mg, Q6H PRN  potassium bicarbonate, 35 mEq, PRN  potassium bicarbonate, 50 mEq, PRN  potassium bicarbonate, 60 mEq, PRN  potassium, sodium phosphates, 2 packet, PRN  potassium, sodium phosphates, 2 packet, PRN  potassium, sodium phosphates, 2 packet, PRN  sodium chloride 0.9%, 10 mL, PRN       Today I independently reviewed pertinent medications, lines/drains/airways, imaging, cardiology results, laboratory results,  "microbiology results,     ISTAT: No results for input(s): "PH", "PCO2", "PO2", "POCSATURATED", "HCO3", "BE", "POCNA", "POCK", "POCTCO2", "POCGLU", "POCICA", "POCLAC", "SAMPLE" in the last 24 hours.   Chem:   Recent Labs   Lab 11/26/24  0100   *  147*   K 4.1  4.1   *  116*   CO2 22*  22*     101   BUN 13  13   CREATININE 0.6  0.6   CALCIUM 8.8  8.8   MG 2.4   PHOS 2.7   ANIONGAP 9  9   PROT 6.2  6.2   ALBUMIN 2.8*  2.8*   BILITOT 0.6  0.6   ALKPHOS 89  89   AST 24  24   ALT 13  13     Heme:   Recent Labs   Lab 11/26/24  0100   WBC 15.87*   HGB 9.8*   HCT 31.7*        Endo: No results for input(s): "POCTGLUCOSE" in the last 24 hours.       Assessment/Plan:     Neuro  * Cerebral aneurysm  Ms. 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 CTA was negative for new occlusion, EEG was negative for epileptiform activity, and MRI revealed edema in the L anterior temporal lobe and posterior inferior frontal lobes but without changes that would explain patient's clinical exam. Has been maintained on milrinone/nicardipine to maintain SBP<120, MAP>65. After discussion with NSGY staff, we will liberalize MAP goal and follow neuro exam to monitor for improvement with higher systolic pressures.    - Admit to NCC   - s/p L pterional crani, 7 clips applied in proximity to large irregular L MCA bifurcation aneurysm and other adjacent small aneurysms, 1 clip applied across neck of aneurysm just proximal to anterior choroidal.   - Na goal euna  - Q 1h vitals  - Q 1h neuro checks  - Keppra 1g BID  - s/p dex 4 q 6 x 3 doses  - post-op ancef x5d  11/26/2024: -160  Restart home gabapentin and citalopram  Pending SLP eval   q6 hours         Cardiac/Vascular  Pure hypercholesterolemia  - home lipitor 20    Chronic " diastolic congestive heart failure  STABLE  Appears to be euvolemic and maintains hemodynamic stability.      Left Ventricle: The left ventricle is normal in size. Normal wall thickness. There is normal systolic function with a visually estimated ejection fraction of 60 - 65%. There is normal diastolic function.    Right Ventricle: Normal right ventricular cavity size. Wall thickness is normal. Systolic function is normal.    Left Atrium: Left atrium is severely dilated.    Right Atrium: Right atrium is dilated.    Aortic Valve: The aortic valve is a trileaflet valve. There is moderate aortic valve sclerosis. Mildly restricted motion.    Tricuspid Valve: There is mild regurgitation.    IVC/SVC: Normal venous pressure at 3 mmHg.    Essential hypertension  - SBP <160  - Nicardipine gtt and prn hydralazine   - increase lisinopril to 40 mg bid (home dose)  - hydralazine 50 mg bid  - cardene gtt available    - Echo:   Left Ventricle: The left ventricle is normal in size. Normal wall thickness. There is normal systolic function with a visually estimated ejection fraction of 60 - 65%. There is normal diastolic function.    Right Ventricle: Normal right ventricular cavity size. Wall thickness is normal. Systolic function is normal.    Left Atrium: Left atrium is severely dilated.    Right Atrium: Right atrium is dilated.    Aortic Valve: The aortic valve is a trileaflet valve. There is moderate aortic valve sclerosis. Mildly restricted motion.    Tricuspid Valve: There is mild regurgitation.    IVC/SVC: Normal venous pressure at 3 mmHg.    Other  Debility  - PT/OT eval and treat           The patient is being Prophylaxed for:  Venous Thromboembolism with: Chemical  Stress Ulcer with: Not Applicable   Ventilator Pneumonia with: not applicable    Activity Orders            Elevate HOB Elevate (30-45 degrees) Elevate HOB to 30 - 45 degrees during feeding unless otherwise stated starting at 11/22 1620    Turn patient starting  at 11/20 2200    Elevate HOB starting at 11/20 2112    Diet NPO: NPO starting at 11/20 2112          Full Code    SRIDHAR ConnellyC  Neurocritical Care  [facility] - Neuro Critical Care

## 2024-11-26 NOTE — PT/OT/SLP PROGRESS
"Speech Language Pathology Treatment    Patient Name:  Ca Coats   MRN:  [id number]  Admitting Diagnosis: Cerebral aneurysm    Recommendations:                 General Recommendations:  Dysphagia therapy, Speech/language therapy, and Cognitive-linguistic therapy  Diet recommendations:  NPO, Liquid Diet Level: NPO   **Pt cleared for small sips of thin liquids/small bites of puree for pleasure when awake/alert/requesting   Aspiration Precautions: Feed only when awake/alert, Frequent oral care, HOB to 90 degrees, Monitor for s/s of aspiration, Small bites/sips, and Standard aspiration precautions   General Precautions: Standard, NPO, fall, aspiration, aphasia  Communication strategies:  provide increased time to answer and go to room if call light pushed    Assessment:     Ca Coats is a 73 y.o. female with an SLP diagnosis of Aphasia, Dysphagia, and Cognitive-Linguistic Impairment.    Subjective   Pt seen unaccompanied at the bedside with NGT and bilateral wrist restraints in place. RN agreeable to SLP session. Pt requiring frequent verbal and tactile cues to maintain wakeful state throughout session.    Pain/Comfort:  Pain Rating 1: 0/10    Respiratory Status: Room air    Objective:     Has the patient been evaluated by SLP for swallowing?   Yes  Keep patient NPO? Yes     Pt resting upon SLP arrival, requiring verbal and tactile stimulus to initially rouse and frequently requiring t/o session. Pt oriented to self and place (hospital) IND, requiring max cues for name of hospital, month, and year. Pt able to respond to simple yes/no questions and follow simple commands in function given repetitions. Pt with fair recall of personal biographical information, but often defaulted to "I don't know" in response to open ended questions.  Pt unable to complete responsive naming task, but able to repeat a single CVC word.   Pt repositioned and HOB raised prior to PO. Pt overall lethargic, but " intermittently requesting more PO in between trials. Of note, pt only intermittently opens her eyes on command, but does not sustain for longer than a few seconds. Pt accepted ice chips x2 with delayed swallow initiation per palpation on the first trial only. Thin liquids via tsp and straw sips and puree via 1/2 and full tsps with good oral acceptance and a more timely swallow trigger per palpation. Pt demonstrating increased awareness of presentations of PO this date, only requiring cues to close lips around utensil two times. Vocal quality remained clear across trials with no overt signs of aspiration appreciated. SLP educated pt re: services, continued decreased alertness, pleasure feeding, aspiration risks and precautions, goals, and poc to which she verbalized understanding but will benefit from continued reinforcements. Recommend NPO with continued alternate means of nutrition. Pt clear for pleasure feeding of puree consistency with thin liquids when awake/alert/requesting only.    Goals:   Multidisciplinary Problems       SLP Goals          Problem: SLP    Goal Priority Disciplines Outcome   SLP Goal     SLP Progressing   Description: Speech Language Pathology Goals  Goals expected to be met by 12/5:  1) Pt will participate in ongoing assessment of swallow function to determine least restrictive diet.  2) Pt will participate in ongoing speech, language, cognitive evaluation to determine possible goals and poc.   3) Pt will respond to simple yes/no questions with 80% accy given min cues.  4) Pt will complete automatic speech tasks with 90% accy given min cues.  5) Pt will complete naming tasks with 80% accy given mod cues.  6) Pt will follow simple commands with 90% accy given mod cues.                                 Plan:     Patient to be seen:  5 x/week   Plan of Care expires:  12/22/24  Plan of Care reviewed with:  patient   SLP Follow-Up:  Yes       Discharge recommendations:  High Intensity Therapy    Barriers to Discharge:  Level of Skilled Assistance Needed      Time Tracking:     SLP Treatment Date:   11/26/24  Speech Start Time:  1307  Speech Stop Time:  1335     Speech Total Time (min):  28 min    Billable Minutes: Speech Therapy Individual 10, Treatment Swallowing Dysfunction 10, and Self Care/Home Management Training 8    11/26/2024

## 2024-11-26 NOTE — PLAN OF CARE
"[facility] Care Plan  POC reviewed with Ca Coats and family at 1400. Patient and Family verbalized understanding. Questions and concerns addressed. No acute events today. Pt progressing toward goals. Will continue to monitor. See below and flowsheets for full assessment and VS info.     -q4 neuro checks  -weaned off Cardene this am  -seen by speech, puree foods for pleasure when awake  -patient transferred to NPU on specialty bed w/ telebox and accompanied by RN x3  -Daughter requests that patient be seen by the  on Thanksgiving day.  notified of request and change of location to NPU.       Is this a stroke patient? no    Neuro:  [place] Coma Scale  Best Eye Response: 3-->(E3) to speech  Best Motor Response: 6-->(M6) obeys commands  Best Verbal Response: 2-->(V2) incomprehensible speech  [place] Coma Scale Score: 11  Assessment Qualifiers: patient not sedated/intubated  Pupil PERRLA: no     24 hr Temp:  [97.7 °F (36.5 °C)-98.9 °F (37.2 °C)]     CV:   Rhythm: normal sinus rhythm  BP goals:   SBP < 160  MAP > 65    Resp:           Plan: N/A    GI/:     Diet/Nutrition Received: tube feeding  Last Bowel Movement: 11/23/24  Voiding Characteristics: external catheter    Intake/Output Summary (Last 24 hours) at 11/26/2024 1623  Last data filed at 11/26/2024 1501  Gross per 24 hour   Intake 2374.44 ml   Output 1200 ml   Net 1174.44 ml     Unmeasured Output  Urine Occurrence: 1  Stool Occurrence: 2  Pad Count: 1    Labs/Accuchecks:  Recent Labs   Lab 11/26/24  0100   WBC 15.87*   RBC 3.03*   HGB 9.8*   HCT 31.7*         Recent Labs   Lab 11/26/24  0100   *  147*   K 4.1  4.1   CO2 22*  22*   *  116*   BUN 13  13   CREATININE 0.6  0.6   ALKPHOS 89  89   ALT 13  13   AST 24  24   BILITOT 0.6  0.6      Recent Labs   Lab 11/21/24  0904   INR 1.0   APTT 25.4    No results for input(s): "CPK", "CPKMB", "TROPONINI", "MB" in the last 168 hours.    Electrolytes: No replacement " orders  Accuchecks: no    Gtts:      LDA/Wounds:    Domenico Risk Assessment  Sensory Perception: 3-->slightly limited  Moisture: 3-->occasionally moist  Activity: 1-->bedfast  Mobility: 2-->very limited  Nutrition: 3-->adequate  Friction and Shear: 2-->potential problem  Domenico Score: 14    Is your domenico score 12 or less? no            Restraints: yes  Restraint Order  Length of Order: Order good for next 24 hours or when removed.  Date that the current order will : 24  Time that the current order will : 1238  Order Upon Application: Yes    [facility]

## 2024-11-26 NOTE — ASSESSMENT & PLAN NOTE
- SBP <160  - Nicardipine gtt and prn hydralazine   - increase lisinopril to 40 mg bid (home dose)  - hydralazine 50 mg bid  - cardene gtt available    - Echo:   Left Ventricle: The left ventricle is normal in size. Normal wall thickness. There is normal systolic function with a visually estimated ejection fraction of 60 - 65%. There is normal diastolic function.    Right Ventricle: Normal right ventricular cavity size. Wall thickness is normal. Systolic function is normal.    Left Atrium: Left atrium is severely dilated.    Right Atrium: Right atrium is dilated.    Aortic Valve: The aortic valve is a trileaflet valve. There is moderate aortic valve sclerosis. Mildly restricted motion.    Tricuspid Valve: There is mild regurgitation.    IVC/SVC: Normal venous pressure at 3 mmHg.

## 2024-11-27 PROBLEM — G93.6 VASOGENIC CEREBRAL EDEMA: Status: ACTIVE | Noted: 2024-11-27

## 2024-11-27 PROBLEM — E83.39 HYPOPHOSPHATEMIA: Status: ACTIVE | Noted: 2024-11-27

## 2024-11-27 PROBLEM — I61.5 IVH (INTRAVENTRICULAR HEMORRHAGE): Status: ACTIVE | Noted: 2024-11-27

## 2024-11-27 PROBLEM — E87.0 HYPERNATREMIA: Status: ACTIVE | Noted: 2024-11-27

## 2024-11-27 PROBLEM — D62 ACUTE POSTOPERATIVE ANEMIA DUE TO EXPECTED BLOOD LOSS: Status: ACTIVE | Noted: 2024-11-27

## 2024-11-27 PROBLEM — I47.20 VENTRICULAR TACHYCARDIA: Status: ACTIVE | Noted: 2024-11-27

## 2024-11-27 PROBLEM — R47.01 APHASIA: Status: ACTIVE | Noted: 2024-11-27

## 2024-11-27 PROBLEM — Z79.01 CHRONIC ANTICOAGULATION: Status: ACTIVE | Noted: 2024-11-27

## 2024-11-27 PROBLEM — I60.9 SAH (SUBARACHNOID HEMORRHAGE): Status: ACTIVE | Noted: 2024-11-27

## 2024-11-27 PROBLEM — D68.59 HYPERCOAGULABLE STATE: Status: ACTIVE | Noted: 2024-11-27

## 2024-11-27 PROBLEM — I61.9 NONTRAUMATIC INTRACEREBRAL HEMORRHAGE: Status: ACTIVE | Noted: 2024-11-27

## 2024-11-27 LAB
BASOPHILS # BLD AUTO: 0.06 K/UL (ref 0–0.2)
BASOPHILS NFR BLD: 0.4 % (ref 0–1.9)
DIFFERENTIAL METHOD BLD: ABNORMAL
EOSINOPHIL # BLD AUTO: 0.1 K/UL (ref 0–0.5)
EOSINOPHIL NFR BLD: 0.7 % (ref 0–8)
ERYTHROCYTE [DISTWIDTH] IN BLOOD BY AUTOMATED COUNT: 14.1 % (ref 11.5–14.5)
HCT VFR BLD AUTO: 30.2 % (ref 37–48.5)
HGB BLD-MCNC: 9.4 G/DL (ref 12–16)
IMM GRANULOCYTES # BLD AUTO: 0.1 K/UL (ref 0–0.04)
IMM GRANULOCYTES NFR BLD AUTO: 0.7 % (ref 0–0.5)
LYMPHOCYTES # BLD AUTO: 2.6 K/UL (ref 1–4.8)
LYMPHOCYTES NFR BLD: 16.9 % (ref 18–48)
MAGNESIUM SERPL-MCNC: 2.5 MG/DL (ref 1.6–2.6)
MCH RBC QN AUTO: 32.3 PG (ref 27–31)
MCHC RBC AUTO-ENTMCNC: 31.1 G/DL (ref 32–36)
MCV RBC AUTO: 104 FL (ref 82–98)
MONOCYTES # BLD AUTO: 1.7 K/UL (ref 0.3–1)
MONOCYTES NFR BLD: 11 % (ref 4–15)
NEUTROPHILS # BLD AUTO: 10.6 K/UL (ref 1.8–7.7)
NEUTROPHILS NFR BLD: 70.3 % (ref 38–73)
NRBC BLD-RTO: 0 /100 WBC
PHOSPHATE SERPL-MCNC: 2.6 MG/DL (ref 2.7–4.5)
PLATELET # BLD AUTO: 223 K/UL (ref 150–450)
PMV BLD AUTO: 11.6 FL (ref 9.2–12.9)
RBC # BLD AUTO: 2.91 M/UL (ref 4–5.4)
WBC # BLD AUTO: 15.06 K/UL (ref 3.9–12.7)

## 2024-11-27 PROCEDURE — 11000001 HC ACUTE MED/SURG PRIVATE ROOM: Mod: HCNC

## 2024-11-27 PROCEDURE — 97530 THERAPEUTIC ACTIVITIES: CPT | Mod: HCNC

## 2024-11-27 PROCEDURE — 25000003 PHARM REV CODE 250: Mod: HCNC | Performed by: PHYSICIAN ASSISTANT

## 2024-11-27 PROCEDURE — 25000003 PHARM REV CODE 250: Mod: HCNC | Performed by: PSYCHIATRY & NEUROLOGY

## 2024-11-27 PROCEDURE — 83735 ASSAY OF MAGNESIUM: CPT | Mod: HCNC | Performed by: NURSE PRACTITIONER

## 2024-11-27 PROCEDURE — 63600175 PHARM REV CODE 636 W HCPCS: Mod: HCNC | Performed by: NURSE PRACTITIONER

## 2024-11-27 PROCEDURE — 97530 THERAPEUTIC ACTIVITIES: CPT | Mod: HCNC,CQ

## 2024-11-27 PROCEDURE — 85025 COMPLETE CBC W/AUTO DIFF WBC: CPT | Mod: HCNC | Performed by: NURSE PRACTITIONER

## 2024-11-27 PROCEDURE — 25000003 PHARM REV CODE 250: Mod: HCNC

## 2024-11-27 PROCEDURE — 97535 SELF CARE MNGMENT TRAINING: CPT | Mod: HCNC

## 2024-11-27 PROCEDURE — 84100 ASSAY OF PHOSPHORUS: CPT | Mod: HCNC | Performed by: NURSE PRACTITIONER

## 2024-11-27 PROCEDURE — 92507 TX SP LANG VOICE COMM INDIV: CPT | Mod: HCNC

## 2024-11-27 PROCEDURE — 92526 ORAL FUNCTION THERAPY: CPT | Mod: HCNC

## 2024-11-27 PROCEDURE — 36415 COLL VENOUS BLD VENIPUNCTURE: CPT | Mod: HCNC | Performed by: NURSE PRACTITIONER

## 2024-11-27 PROCEDURE — 97110 THERAPEUTIC EXERCISES: CPT | Mod: HCNC,CQ

## 2024-11-27 PROCEDURE — 99024 POSTOP FOLLOW-UP VISIT: CPT | Mod: HCNC,,, | Performed by: PHYSICIAN ASSISTANT

## 2024-11-27 RX ORDER — SODIUM,POTASSIUM PHOSPHATES 280-250MG
1 POWDER IN PACKET (EA) ORAL ONCE
Status: COMPLETED | OUTPATIENT
Start: 2024-11-27 | End: 2024-11-27

## 2024-11-27 RX ADMIN — CITALOPRAM HYDROBROMIDE 10 MG: 10 TABLET ORAL at 09:11

## 2024-11-27 RX ADMIN — HYDRALAZINE HYDROCHLORIDE 50 MG: 25 TABLET ORAL at 06:11

## 2024-11-27 RX ADMIN — HEPARIN SODIUM 7500 UNITS: 5000 INJECTION INTRAVENOUS; SUBCUTANEOUS at 02:11

## 2024-11-27 RX ADMIN — HYDRALAZINE HYDROCHLORIDE 50 MG: 25 TABLET ORAL at 02:11

## 2024-11-27 RX ADMIN — ATORVASTATIN CALCIUM 20 MG: 20 TABLET, FILM COATED ORAL at 09:11

## 2024-11-27 RX ADMIN — GABAPENTIN 600 MG: 250 SOLUTION ORAL at 06:11

## 2024-11-27 RX ADMIN — GABAPENTIN 600 MG: 250 SOLUTION ORAL at 02:11

## 2024-11-27 RX ADMIN — LEVETIRACETAM 1000 MG: 500 SOLUTION ORAL at 09:11

## 2024-11-27 RX ADMIN — LISINOPRIL 40 MG: 20 TABLET ORAL at 09:11

## 2024-11-27 RX ADMIN — GABAPENTIN 600 MG: 250 SOLUTION ORAL at 09:11

## 2024-11-27 RX ADMIN — HEPARIN SODIUM 7500 UNITS: 5000 INJECTION INTRAVENOUS; SUBCUTANEOUS at 09:11

## 2024-11-27 RX ADMIN — HYDRALAZINE HYDROCHLORIDE 50 MG: 25 TABLET ORAL at 09:11

## 2024-11-27 RX ADMIN — HEPARIN SODIUM 7500 UNITS: 5000 INJECTION INTRAVENOUS; SUBCUTANEOUS at 06:11

## 2024-11-27 RX ADMIN — POTASSIUM & SODIUM PHOSPHATES POWDER PACK 280-160-250 MG 1 PACKET: 280-160-250 PACK at 09:11

## 2024-11-27 NOTE — PROGRESS NOTES
Dario Glover - Neurosurgery ([facility]  Neurosurgery  Progress Note    Subjective:     History of Present Illness: Isabel Coats is a 73-year-old female past medical history of hypertension, obstructive sleep apnea on CPAP, suggestive heart failure, obesity, history of gastric cancer status post chemoradiation and DVT in currently on Eliquis. She presents to [facility] L pterional crani, 7 clips applied in proximity to large irregular L MCA bifurcation aneurysm and other adjacent small aneurysms, 1 clip applied across neck of aneurysm just proximal to anterior choroidal.  She also has a current smoker she was initially referred to Neurology for workup for dizziness. She had been complaining of vertigo multiple times a day for the past several months, associated with shortness of breath, palpitation, nausea, headache, and feeling faint. She is admitted to [facility] for a higher level of care.     Post-Op Info:  Procedure(s) (LRB):  CRANIOTOMY, WITH ANEURYSM CLIPPING W/ STEALTH (Left)  CRANIOTOMY, FOR ANEURYSM OF VERTEBROBASILAR OR CAROTID CIRCULATION (Left)  DISSECTION, NERVE, USING OPERATING MICROSCOPE (Left)   7 Days Post-Op   Interval History: NAEON. Ox2, more conversant today. Asking for something to eat. SLP advanced diet to pureed w/ thin liquids. Follows commands x 4. Document strict I/O, US TCD today with evidence of mild left MCA vasospasm. Working on dispo.     Medications:  Continuous Infusions:  Scheduled Meds:   atorvastatin  20 mg Per NG tube Daily    citalopram  10 mg Per NG tube Daily    gabapentin  600 mg Per NG tube Q8H    heparin (porcine)  7,500 Units Subcutaneous Q8H    hydrALAZINE  50 mg Per NG tube Q8H    levetiracetam  1,000 mg Per NG tube BID    lisinopriL  40 mg Per NG tube Daily     PRN Meds:  Current Facility-Administered Medications:     acetaminophen, 650 mg, Per NG tube, Q4H PRN    bisacodyL, 10 mg, Rectal, Daily PRN    hydrALAZINE, 10 mg, Intravenous, Q4H PRN    labetalol, 10 mg, Intravenous, Q4H  PRN    morphine, 2 mg, Intravenous, Q6H PRN    naloxone, 0.4 mg, Intravenous, PRN    ondansetron, 4 mg, Intravenous, Q12H PRN    oxyCODONE, 5 mg, Per NG tube, Q6H PRN    sodium chloride 0.9%, 10 mL, Intravenous, PRN     Review of Systems  Objective:     Weight: 123.4 kg (272 lb)  Body mass index is 43.9 kg/m².  Vital Signs (Most Recent):  Temp: 98.1 °F (36.7 °C) (11/27/24 1149)  Pulse: 69 (11/27/24 1149)  Resp: 18 (11/27/24 1149)  BP: (!) 148/66 (11/27/24 1149)  SpO2: 96 % (11/27/24 1149) Vital Signs (24h Range):  Temp:  [97.7 °F (36.5 °C)-98.9 °F (37.2 °C)] 98.1 °F (36.7 °C)  Pulse:  [67-88] 69  Resp:  [16-20] 18  SpO2:  [93 %-98 %] 96 %  BP: (131-155)/(60-78) 148/66     Date 11/27/24 0700 - 11/28/24 0659   Shift 3719-8099 3912-3016 1616-0150 24 Hour Total   INTAKE   P.O. 0   0   NG/   200   Shift Total(mL/kg) 200(1.6)   200(1.6)   OUTPUT   Shift Total(mL/kg)       Weight (kg) 123.4 123.4 123.4 123.4                            NG/OG Tube 11/21/24 1800 Right nostril (Active)   Placement Check placement verified by distal tube length measurement 11/26/24 1501   Tolerance no signs/symptoms of discomfort 11/26/24 1600   Securement secured to nostril center w/ adhesive device 11/26/24 1600   Clamp Status/Tolerance unclamped 11/26/24 1600   Suction Setting/Drainage Method suction at the bedside 11/26/24 1600   Insertion Site Appearance no redness, warmth, tenderness, skin breakdown, drainage 11/26/24 1600   Drainage Bile;None 11/26/24 1600   Flush/Irrigation flushed w/;water 11/26/24 1600   Feeding Type continuous;by pump 11/26/24 1600   Feeding Action feeding continued 11/26/24 1600   Current Rate (mL/hr) 50 mL/hr 11/26/24 1600   Goal Rate (mL/hr) 50 mL/hr 11/26/24 1600   Intake (mL) 30 mL 11/26/24 2020   Water Bolus (mL) 250 mL 11/27/24 0549   Formula Name Isosource 1.5 11/26/24 1600   Intake (mL) - Formula Tube Feeding 200 11/27/24 0800   Residual Amount (ml) 0 ml 11/22/24 1505       Female External Urinary  "Catheter w/ Suction 11/21/24 1400 (Active)   Skin no redness;no breakdown 11/26/24 1600   Tolerance no signs/symptoms of discomfort 11/26/24 1600   Suction Continuous suction at 60 mmHg 11/26/24 0701   Date of last wick change 11/25/24 11/26/24 0701   Output (mL) 250 mL 11/26/24 1501          Physical Exam       Neurosurgery Physical Exam    General: well developed, well nourished, no distress.   Head: normocephalic  Neurologic: Alert and oriented. Thought content appropriate.  GCS: Motor: 6/Verbal: 5/Eyes: 4 GCS Total: 15  Mental Status: Awake, Alert, Oriented x 2  Language: aphasia improving  Speech: No dysarthria  Eyes: pupils equal, round, reactive to light with accomodation, EOMI.  Pulmonary: normal respirations, no signs of respiratory distress  Sensory: intact to light touch throughout  Motor Strength: Moves all extremities spontaneously with good tone. Grossly full strength upper and lower extremities. Follows commands x 4 antigravity. No abnormal movements seen.   Skin: Skin is warm, dry and intact.  Incision c/d/I.      Significant Labs:  Recent Labs   Lab 11/26/24 0100 11/27/24  0355     101  --    *  147*  --    K 4.1  4.1  --    *  116*  --    CO2 22*  22*  --    BUN 13  13  --    CREATININE 0.6  0.6  --    CALCIUM 8.8  8.8  --    MG 2.4 2.5     Recent Labs   Lab 11/26/24  0100 11/27/24  0355   WBC 15.87* 15.06*   HGB 9.8* 9.4*   HCT 31.7* 30.2*    223     No results for input(s): "LABPT", "INR", "APTT" in the last 48 hours.  Microbiology Results (last 7 days)       ** No results found for the last 168 hours. **          Recent Lab Results         11/27/24 0355        Baso # 0.06       Basophil % 0.4       Differential Method Automated       Eos # 0.1       Eos % 0.7       Gran # (ANC) 10.6       Gran % 70.3       Hematocrit 30.2       Hemoglobin 9.4       Immature Grans (Abs) 0.10  Comment: Mild elevation in immature granulocytes is non specific and   can be seen in " a variety of conditions including stress response,   acute inflammation, trauma and pregnancy. Correlation with other   laboratory and clinical findings is essential.         Immature Granulocytes 0.7       Lymph # 2.6       Lymph % 16.9       Magnesium  2.5       MCH 32.3       MCHC 31.1              Mono # 1.7       Mono % 11.0       MPV 11.6       nRBC 0       Phosphorus Level 2.6       Platelet Count 223       RBC 2.91       RDW 14.1       WBC 15.06             All pertinent labs from the last 24 hours have been reviewed.    Significant Diagnostics:  I have reviewed all pertinent imaging results/findings within the past 24 hours.  Assessment/Plan:     * Cerebral aneurysm  Isabel Coats is a 73-year-old female past medical history of hypertension, obstructive sleep apnea on CPAP, suggestive heart failure, obesity, history of gastric cancer status post chemoradiation and DVT in currently on Eliquis. She presents to [facility] s/p L pterional crani on 11/20 with 7 clips applied in proximity to large irregular L MCA bifurcation aneurysm and other adjacent small aneurysms, 1 clip applied across neck of aneurysm just proximal to anterior choroidal.  After surgery she seemed to be gradually waking up appropriately overnight, then AM of POD1 around shift change had an unexplained decline in exam during which stat CT showed IPH in the right side of her cerebellum. She became less responsive as well as developed a L gaze preference and dense aphasia. She had a perfusion scan with questionable perfusion deficit to the left anterior temporal lobe and milrinone was started with seemingly some clinical improvement. Now on POD5 has been weaned off milrinone, gaze preference resolved, following commands x4 antigravity, able to state name but still remains aphasic.    Imaging:  CTA Head 11/20: expected post-op changes  CTH 11/21 8 AM: right sided cerebellar IPH  CTA 11/21 10 AM: stable IPH, possible frontal contusion  CTP  11/21 4 PM: possible left anterior temporal perfusion deficit in territory of branches distal to MCA clips   MRI brain w wo 11/22: no strokes, post op changes, stable cerebellar bleed. some edema in right frontal lobe.    Plans:  -TTF 11/26  -q4h neurochecks/vitals  -SBP <160  -drain removed 11/25  -Continue to monitor respiratory status, on room air  -keppra 1g BID initially postop, now 500 BID. cEEG negative during initial AMS POD1  -now has been weaned off milrinone, TCDs with mild increase left MCA velocities. Continue daily TCDs. Keep euvolemic   -PT/OT/OOB  -SLP - advanced to pureed/thin liquids. Hold TF during day. Resume at night if not eating meals during day. Calorie count ordered. RD following.  -Continue to monitor clinically, notify NSGY immediately with any changes in neuro status   -SQH DVT ppx    Dispo: pending placement        Virgie Peacock PA-C  Neurosurgery  Dario Glover - Neurosurgery ([facility])

## 2024-11-27 NOTE — CONSULTS
Consult received regarding calorie count initiation.?   Note: pt currently being followed by member of RD team; see note from 11/21 for most recent nutrition progress note. Pt's TF of Isosource 1.5 @ goal of 50 ml/hr meets 100% of EEN/EPN.     ?    SLP Recs as of 11/26:   Diet recommendations:  Puree Diet - IDDSI Level 4, Liquid Diet Level: Thin liquids - IDDSI Level 0   Aspiration Precautions: 1 bite/sip at a time, Assistance with meals, Frequent oral care, HOB to 90 degrees, Monitor for s/s of aspiration, Small bites/sips, and Standard aspiration precautions          Calorie count notification sign placed on door and papers left in room for pt's nurse to document all foods/beverages/Boost ONS consumed.?RN informed of calorie count initiation. Calorie count will go from 11/27-11/29 - RD will document results when completed.     ?    Please reach out with any questions/concerns. Thanks!

## 2024-11-27 NOTE — PT/OT/SLP PROGRESS
Physical Therapy CoTreatment  OT present for cotreat due to pt's multiple medical comorbidities and functional/cognition deficits requiring two skilled therapists to appropriately progress pt's musculoskeletal strength, neuromuscular control, and endurance while taking into consideration medical acuity and pt safety.     Patient Name:  Ca Coats   MRN:  [id number]    Recommendations:     Discharge Recommendations: High Intensity Therapy  Discharge Equipment Recommendations: hospital bed, lift device, wheelchair  Barriers to discharge: None    Assessment:     Ca Coats is a 73 y.o. female admitted with a medical diagnosis of Cerebral aneurysm.  She presents with the following impairments/functional limitations: weakness, impaired endurance, impaired self care skills, impaired functional mobility, gait instability, impaired balance, impaired cognition, decreased upper extremity function, decreased lower extremity function . Pt agreeable for therapy, pt w/improved A&O, able to state name w/extra time and location, able to perform therex in sitting w/SBA balance, sit <> stand transfer w/mod A x 2 and presents w/CGA to SBA trunk stability in sitting, pt eyes cross midline    Rehab Prognosis: Good; patient would benefit from acute skilled PT services to address these deficits and reach maximum level of function.    Recent Surgery: Procedure(s) (LRB):  CRANIOTOMY, WITH ANEURYSM CLIPPING W/ STEALTH (Left)  CRANIOTOMY, FOR ANEURYSM OF VERTEBROBASILAR OR CAROTID CIRCULATION (Left)  DISSECTION, NERVE, USING OPERATING MICROSCOPE (Left) 7 Days Post-Op    Plan:     During this hospitalization, patient to be seen 4 x/week to address the identified rehab impairments via gait training, therapeutic activities, therapeutic exercises, neuromuscular re-education and progress toward the following goals:    Plan of Care Expires:  12/24/24    Subjective     Chief Complaint: no complaint  Patient/Family  "Comments/goals: "I can try"  Pain/Comfort:  Pain Rating 1: 0/10  Pain Rating Post-Intervention 1: 0/10      Objective:     Communicated with RN prior to session.  Patient found supine with telemetry, NG tube, restraints upon PT entry to room.     General Precautions: Standard, fall, aspiration  Orthopedic Precautions: N/A  Braces: N/A  Respiratory Status: Room air     Functional Mobility:    Bed Mobility:   Rolling: to R side with moderate assistance  Supine > Sit: moderate assistance and of 2 persons; assist to BLE and trunk, pt able to scoot to sit EOB w/extra time and SBA  Sit > Supine: moderate assistance    Transfers:   Sit <> Stand Transfer: moderate assistance and of 2 persons from EOB without AD; performed sit <> stand x 1 w/therapists on either side, maintains stand for 60s w/VC's for posture and to bring hips more anteriorly, performed lateral weight shifting x 3 each side, no buckling noted during stand    Balance:   Sitting balance: FAIR+: Maintains balance through MINIMAL excursions of active trunk motion; CGA to SBA for sitting balance, decreased righting reaction  Standing balance:   POOR+: Needs MINIMAL assist to maintain     BP:  122/72 sup  142/64 sitting  146/64 post activity and back to bed      AM-PAC 6 CLICK MOBILITY  Turning over in bed (including adjusting bedclothes, sheets and blankets)?: 2  Sitting down on and standing up from a chair with arms (e.g., wheelchair, bedside commode, etc.): 2  Moving from lying on back to sitting on the side of the bed?: 2  Moving to and from a bed to a chair (including a wheelchair)?: 1  Need to walk in hospital room?: 1  Climbing 3-5 steps with a railing?: 1  Basic Mobility Total Score: 9       Treatment & Education:  Education:  PT role and PoC to increase strength and personal mobility    Therex:LAQ, marching, heel raises, ankle pumps x 10 in sitting, requires demonstration for pt to participate, occasional cues for pt to lean anteriorly    Patient left " supine with all lines intact, call button in reach, and RN notified..    GOALS:   Multidisciplinary Problems       Physical Therapy Goals          Problem: Physical Therapy    Goal Priority Disciplines Outcome Interventions   Physical Therapy Goal     PT, PT/OT Progressing    Description: Goals to be met by: 2024     Patient will increase functional independence with mobility by performin. Supine to sit with MInimal Assistance  2. Sit to supine with Minimal Assistance  3. Sit to stand transfer with Moderate Assistance  4. Bed to chair transfer with Maximum Assistance using LRAD  5. Gait  x 5 feet with Maximum Assistance using LRAD.   6. Sitting at edge of bed x5 minutes with Stand-by Assistance  7. Lower extremity exercise program x15 reps per handout, with assistance as needed    Hospital Bed - Patient requires a hospital bed for positioning of the body in ways that are not feasible with an ordinary bed. The patient requires special positioning for pain relief, limited mobility, and/or being unable to independently make changes in body position without the use of a hospital bed. Pillows and wedges will not be adequate for resolving these positional issues.     Wheelchair - Patient has a mobility limitation that significantly impairs their ability to participate in one or more mobility related activities of daily living in customary locations in the home. The mobility limitation cannot be sufficiently resolved by the use of a cane or walker. The use of a manual wheelchair will greatly improve the patient's ability to participate in MRADLs. The patient will use the wheelchair on a regular basis at home. They have expressed their willingness to use a manual wheelchair in the home, and have a caregiver who is available and willing to assist with the wheelchair if needed                         Time Tracking:     PT Received On: 24  PT Start Time: 1032     PT Stop Time: 1100  PT Total Time (min): 28  min     Billable Minutes: Therapeutic Activity 14min and Therapeutic Exercise 14min    Treatment Type: Treatment  PT/PTA: PTA     Number of PTA visits since last PT visit: 1 11/27/2024

## 2024-11-27 NOTE — PLAN OF CARE
Pt stepped down from ICU overnight. Pt's dispo is: SNF. Multiple referrals sent yesterday, SW sent updates to Rob Goldman this morning. SW performed a handoff to Pt's new .     ESTRELLA Pitts, LMSW [facility]  [id number]

## 2024-11-27 NOTE — PT/OT/SLP PROGRESS
"Occupational Therapy  Co- Treatment    Name: Ca Coats  MRN: [id number]  Admitting Diagnosis:  Cerebral aneurysm  7 Days Post-Op    Recommendations:     Discharge Recommendations: High Intensity Therapy  Discharge Equipment Recommendations:  hospital bed, lift device, wheelchair  Barriers to discharge:  None    Assessment:     Ca Coats is a 73 y.o. female with a medical diagnosis of Cerebral aneurysm.  She presents with good progress today, able to sit EOB with light assistance to complete oral hygiene. She was able to complete sit>stand transfers today with assistance of 2 persons, although verbalizing fear of falling. BP remaining within goal range (-160) throughout session. Performance deficits affecting function are impaired endurance, weakness, impaired balance, decreased safety awareness, decreased lower extremity function, impaired functional mobility, decreased upper extremity function, impaired self care skills, impaired skin, decreased ROM, impaired cognition. Patient continues to demonstrate the need for moderate intensity therapy on a daily basis post acute exhibited by decreased independence with self-care and functional mobility     Rehab Prognosis:  Good; patient would benefit from acute skilled OT services to address these deficits and reach maximum level of function.       Plan:     Patient to be seen 4 x/week to address the above listed problems via self-care/home management, therapeutic activities, therapeutic exercises, neuromuscular re-education  Plan of Care Expires: 12/24/24  Plan of Care Reviewed with: patient    Subjective     Chief Complaint: " Oh, I can't do that" (fear of falling)  Patient/Family Comments/goals: "I would like to eat"  Pain/Comfort:  Pain Rating 1: 0/10  Pain Rating Post-Intervention 1: 0/10    Objective:   Co-treatment with PT performed for patient safety, education, and facilitation of treatment to maximize activity tolerance and " progression towards goals from two skilled therapy disciplines.    Communicated with: nursing prior to session.  Patient found HOB elevated with telemetry, NG tube, restraints upon OT entry to room.    General Precautions: Standard, fall, aspiration    Orthopedic Precautions:N/A  Braces: N/A  Respiratory Status: Room air     Occupational Performance:     Bed Mobility:    Patient completed Supine to Sit with moderate assistance and 2 persons  Patient completed Sit to Supine with moderate assistance   Patient sat EOB for ~ 20 minutes with Contact Guard Assistance progressing to stand by assistance     Functional Mobility/Transfers:  Patient completed Sit <> Stand Transfer with moderate assistance and of 2 persons  with  hand-held assist   Patient with min A and of 2 persons with HHA for standing balance (~1 min)    Activities of Daily Living:  Grooming: contact guard assistance to complete oral hygiene via rung swab with R hand sitting EOB  Lower Body Dressing: maximal assistance to don socks       AMPAC 6 Click ADL: 13    Treatment & Education:    Patient educated on:   -purpose of OT and OT POC  -facilitation and education on proper body mechanics, energy conservation, and safety  -importance of early mobility and out of bed activities with staff assist  -overall benefits of therapy     All questions answered within OT scope and to patient's satisfaction    Patient left HOB elevated with all lines intact, call button in reach, restraints reapplied at end of session, and nursing notified    GOALS:   Multidisciplinary Problems       Occupational Therapy Goals          Problem: Occupational Therapy    Goal Priority Disciplines Outcome Interventions   Occupational Therapy Goal     OT, PT/OT Progressing    Description: Goals to be met by: 12/24/24     Patient will increase functional independence with ADLs by performing:    UE Dressing with Stand-by Assistance.  LE Dressing with Minimal Assistance.  Grooming while seated  with Stand-by Assistance.  Toileting from toilet/bedside commode with Minimal Assistance for hygiene and clothing management.   Supine to sit with Minimal Assistance.  Toilet transfer to toilet/bedside commode with Minimal Assistance.    Patient requires a hospital bed for positioning of the body in ways that are not feasible with an ordinary bed. The patient requires special positioning for pain relief, limited mobility, and/or being unable to independently make changes in body position without the use of a hospital bed. Pillows and wedges will not be adequate for resolving these positional issues.      Patient has a mobility limitation that significantly impairs their ability to participate in one or more mobility related activities of daily living in customary locations in the home. The mobility limitation cannot be sufficiently resolved by the use of a cane or walker. The use of a manual wheelchair will greatly improve the patient's ability to participate in MRADLs. The patient will use the wheelchair on a regular basis at home. They have expressed their willingness to use a manual wheelchair in the home, and have a caregiver who is available and willing to assist with the wheelchair if needed.                            Time Tracking:     OT Date of Treatment: 11/27/24  OT Start Time: 1032  OT Stop Time: 1100  OT Total Time (min): 28 min    Billable Minutes:Self Care/Home Management 18  Therapeutic Activity 10    OT/RICHARD: OT          11/27/2024

## 2024-11-27 NOTE — CARE UPDATE
I have reviewed the chart of Ca Coats who is hospitalized for the following:    Active Hospital Problems    Diagnosis    *Cerebral aneurysm    Hypercoagulable state     DVT in currently on Eliquis       Chronic anticoagulation     DVT in currently on Eliquis       Hypernatremia     Monitor with daily cmp      Hypophosphatemia    Aphasia     Therapy to evaluate and treat       Nontraumatic intracerebral hemorrhage     MRI brain w wo 11/22: no strokes, post op changes, stable cerebellar bleed. some edema in right frontal lobe.       Vasogenic cerebral edema     MRI brain w wo 11/22: no strokes, post op changes, stable cerebellar bleed. some edema in right frontal lobe.       Ventricular tachycardia    Acute postoperative anemia due to expected blood loss     Ebl 300  3 point drop in hemoglobin post op  Monitor with daily cbc  Transfuse <7          SAH (subarachnoid hemorrhage)     Small volume subarachnoid and intraventricular hemorrhage noted on imaging      IVH (intraventricular hemorrhage)    Tobacco dependency    DVT of deep femoral vein, right    Hypokalemia     Monitor with daily cmp  replaced      Gastric adenocarcinoma     gastric cancer cT2 or higher      Tobacco abuse    Chronic diastolic congestive heart failure    Pure hypercholesterolemia    Hypothyroidism    Debility    Gait instability    Essential hypertension    Severe obesity (BMI >= 40)        Janette Hurtado NP  [facility]

## 2024-11-27 NOTE — ASSESSMENT & PLAN NOTE
Isabel Coats is a 73-year-old female past medical history of hypertension, obstructive sleep apnea on CPAP, suggestive heart failure, obesity, history of gastric cancer status post chemoradiation and DVT in currently on Eliquis. She presents to [facility] s/p L pterional crani on 11/20 with 7 clips applied in proximity to large irregular L MCA bifurcation aneurysm and other adjacent small aneurysms, 1 clip applied across neck of aneurysm just proximal to anterior choroidal.  After surgery she seemed to be gradually waking up appropriately overnight, then AM of POD1 around shift change had an unexplained decline in exam during which stat CT showed IPH in the right side of her cerebellum. She became less responsive as well as developed a L gaze preference and dense aphasia. She had a perfusion scan with questionable perfusion deficit to the left anterior temporal lobe and milrinone was started with seemingly some clinical improvement. Now on POD5 has been weaned off milrinone, gaze preference resolved, following commands x4 antigravity, able to state name but still remains aphasic.    Imaging:  CTA Head 11/20: expected post-op changes  CTH 11/21 8 AM: right sided cerebellar IPH  CTA 11/21 10 AM: stable IPH, possible frontal contusion  CTP 11/21 4 PM: possible left anterior temporal perfusion deficit in territory of branches distal to MCA clips   MRI brain w wo 11/22: no strokes, post op changes, stable cerebellar bleed. some edema in right frontal lobe.    Plans:  -TTF 11/26  -q4h neurochecks/vitals  -SBP <160  -drain removed 11/25  -Continue to monitor respiratory status, on room air  -keppra 1g BID initially postop, now 500 BID. cEEG negative during initial AMS POD1  -now has been weaned off milrinone, TCDs with mild increase left MCA velocities. Continue daily TCDs. Keep euvolemic   -PT/OT/OOB  -SLP - advanced to pureed/thin liquids. Hold TF during day. Resume at night if not eating meals during day. Calorie count  ordered. RD following.  -Continue to monitor clinically, notify NSGY immediately with any changes in neuro status   -SQH DVT ppx    Dispo: pending placement

## 2024-11-27 NOTE — PT/OT/SLP PROGRESS
Speech Language Pathology Treatment    Patient Name:  Ca Coats   MRN:  [id number]  Admitting Diagnosis: Cerebral aneurysm    Recommendations:                 General Recommendations:  Dysphagia therapy, Speech/language therapy, and Cognitive-linguistic therapy  Diet recommendations:  Puree Diet - IDDSI Level 4, Liquid Diet Level: Thin liquids - IDDSI Level 0   Aspiration Precautions: 1 bite/sip at a time, Assistance with meals, Frequent oral care, HOB to 90 degrees, Monitor for s/s of aspiration, Small bites/sips, and Standard aspiration precautions   General Precautions: Standard, aspiration, pureed diet  Communication strategies:  go to room if call light pushed    Assessment:     Ca Coats is a 73 y.o. female with an SLP diagnosis of Aphasia, Dysphagia, and Cognitive-Linguistic Impairment.  She presents with increased level of alertness.    Subjective   Pt with bilateral with bilateral wrist restraints and NGT in place. Pt with increased alertness/participation this date.    Pain/Comfort:  Pain Rating 1: 0/10    Respiratory Status: Room air    Objective:     Has the patient been evaluated by SLP for swallowing?   Yes  Keep patient NPO? No     Pt with increased level of awake/alertness this date, able to keep her eyes open t/o the session. Pt was oriented to JAZMINE, month, year IND, requiring max cues for place and unable to provide situation. She demonstrated increased utterance length this date. Pt completed automatic speech task of naming JAZMINE, requiring min cues for 2/7 days. Pt able to follow simple commands in function. Pt able to name concrete objects in room with 80% accy IND/100% given max cues for less common object.  HOB raised prior to PO intake. Pt accepted thin liquids via straw sips (4 oz) and puree consistency (whole pudding cup) with 1 cough noted following large, cyclic sip of thins and following 1 bite of puree. Pt demonstrating a strong cough, and vocal quality remained  clear. Recommending initiation of Puree consistency diet with thin liquids. SLP to follow.    Goals:   Multidisciplinary Problems       SLP Goals          Problem: SLP    Goal Priority Disciplines Outcome   SLP Goal     SLP Progressing   Description: Speech Language Pathology Goals  Goals expected to be met by 12/5:  1) Pt will participate in ongoing assessment of swallow function to determine least restrictive diet.  2) Pt will participate in ongoing speech, language, cognitive evaluation to determine possible goals and poc.   3) Pt will respond to simple yes/no questions with 80% accy given min cues.  4) Pt will complete automatic speech tasks with 90% accy given min cues.  5) Pt will complete naming tasks with 80% accy given mod cues.  6) Pt will follow simple commands with 90% accy given mod cues.                                 Plan:     Patient to be seen:  4 x/week   Plan of Care expires:  12/22/24  Plan of Care reviewed with:  patient   SLP Follow-Up:  Yes       Discharge recommendations:  High Intensity Therapy     Time Tracking:     SLP Treatment Date:   11/27/24  Speech Start Time:  0950  Speech Stop Time:  1009     Speech Total Time (min):  19 min    Billable Minutes: Speech Therapy Individual 9 and Treatment Swallowing Dysfunction 10    11/27/2024

## 2024-11-27 NOTE — PLAN OF CARE
Problem: Adult Inpatient Plan of Care  Goal: Plan of Care Review  Outcome: Progressing  Goal: Patient-Specific Goal (Individualized)  Outcome: Progressing  Goal: Absence of Hospital-Acquired Illness or Injury  Outcome: Progressing  Goal: Optimal Comfort and Wellbeing  Outcome: Progressing  Goal: Readiness for Transition of Care  Outcome: Progressing     Problem: Bariatric Environmental Safety  Goal: Safety Maintained with Care  Outcome: Progressing     Problem: Wound  Goal: Optimal Coping  Outcome: Progressing  Goal: Optimal Functional Ability  Outcome: Progressing  Goal: Absence of Infection Signs and Symptoms  Outcome: Progressing  Goal: Improved Oral Intake  Outcome: Progressing  Goal: Optimal Pain Control and Function  Outcome: Progressing  Goal: Skin Health and Integrity  Outcome: Progressing  Goal: Optimal Wound Healing  Outcome: Progressing     Problem: Infection  Goal: Absence of Infection Signs and Symptoms  Outcome: Progressing     Problem: Stroke, Intracerebral Hemorrhage  Goal: Optimal Coping  Outcome: Progressing  Goal: Effective Bowel Elimination  Outcome: Progressing  Goal: Optimal Cerebral Tissue Perfusion  Outcome: Progressing  Goal: Optimal Cognitive Function  Outcome: Progressing  Goal: Effective Communication Skills  Outcome: Progressing  Goal: Optimal Functional Ability  Outcome: Progressing  Goal: Optimal Nutrition Intake  Outcome: Progressing  Goal: Optimal Pain Control and Function  Outcome: Progressing  Goal: Effective Oxygenation and Ventilation  Outcome: Progressing  Goal: Improved Sensorimotor Function  Outcome: Progressing  Goal: Safe and Effective Swallow  Outcome: Progressing  Goal: Effective Urinary Elimination  Outcome: Progressing     Problem: Fall Injury Risk  Goal: Absence of Fall and Fall-Related Injury  Outcome: Progressing     Problem: Skin Injury Risk Increased  Goal: Skin Health and Integrity  Outcome: Progressing     Problem: Restraint, Nonviolent  Goal: Absence of Harm or  Injury  Outcome: Progressing

## 2024-11-27 NOTE — SUBJECTIVE & OBJECTIVE
Interval History: NAEON. Ox2, more conversant today. Asking for something to eat. SLP advanced diet to pureed w/ thin liquids. Follows commands x 4. Document strict I/O, US TCD today with evidence of mild left MCA vasospasm. Working on dispo.     Medications:  Continuous Infusions:  Scheduled Meds:   atorvastatin  20 mg Per NG tube Daily    citalopram  10 mg Per NG tube Daily    gabapentin  600 mg Per NG tube Q8H    heparin (porcine)  7,500 Units Subcutaneous Q8H    hydrALAZINE  50 mg Per NG tube Q8H    levetiracetam  1,000 mg Per NG tube BID    lisinopriL  40 mg Per NG tube Daily     PRN Meds:  Current Facility-Administered Medications:     acetaminophen, 650 mg, Per NG tube, Q4H PRN    bisacodyL, 10 mg, Rectal, Daily PRN    hydrALAZINE, 10 mg, Intravenous, Q4H PRN    labetalol, 10 mg, Intravenous, Q4H PRN    morphine, 2 mg, Intravenous, Q6H PRN    naloxone, 0.4 mg, Intravenous, PRN    ondansetron, 4 mg, Intravenous, Q12H PRN    oxyCODONE, 5 mg, Per NG tube, Q6H PRN    sodium chloride 0.9%, 10 mL, Intravenous, PRN     Review of Systems  Objective:     Weight: 123.4 kg (272 lb)  Body mass index is 43.9 kg/m².  Vital Signs (Most Recent):  Temp: 98.1 °F (36.7 °C) (11/27/24 1149)  Pulse: 69 (11/27/24 1149)  Resp: 18 (11/27/24 1149)  BP: (!) 148/66 (11/27/24 1149)  SpO2: 96 % (11/27/24 1149) Vital Signs (24h Range):  Temp:  [97.7 °F (36.5 °C)-98.9 °F (37.2 °C)] 98.1 °F (36.7 °C)  Pulse:  [67-88] 69  Resp:  [16-20] 18  SpO2:  [93 %-98 %] 96 %  BP: (131-155)/(60-78) 148/66     Date 11/27/24 0700 - 11/28/24 0659   Shift 5777-1026 5719-7128 3664-0481 24 Hour Total   INTAKE   P.O. 0   0   NG/   200   Shift Total(mL/kg) 200(1.6)   200(1.6)   OUTPUT   Shift Total(mL/kg)       Weight (kg) 123.4 123.4 123.4 123.4                            NG/OG Tube 11/21/24 1800 Right nostril (Active)   Placement Check placement verified by distal tube length measurement 11/26/24 1501   Tolerance no signs/symptoms of discomfort 11/26/24  1600   Securement secured to nostril center w/ adhesive device 11/26/24 1600   Clamp Status/Tolerance unclamped 11/26/24 1600   Suction Setting/Drainage Method suction at the bedside 11/26/24 1600   Insertion Site Appearance no redness, warmth, tenderness, skin breakdown, drainage 11/26/24 1600   Drainage Bile;None 11/26/24 1600   Flush/Irrigation flushed w/;water 11/26/24 1600   Feeding Type continuous;by pump 11/26/24 1600   Feeding Action feeding continued 11/26/24 1600   Current Rate (mL/hr) 50 mL/hr 11/26/24 1600   Goal Rate (mL/hr) 50 mL/hr 11/26/24 1600   Intake (mL) 30 mL 11/26/24 2020   Water Bolus (mL) 250 mL 11/27/24 0549   Formula Name Isosource 1.5 11/26/24 1600   Intake (mL) - Formula Tube Feeding 200 11/27/24 0800   Residual Amount (ml) 0 ml 11/22/24 1505       Female External Urinary Catheter w/ Suction 11/21/24 1400 (Active)   Skin no redness;no breakdown 11/26/24 1600   Tolerance no signs/symptoms of discomfort 11/26/24 1600   Suction Continuous suction at 60 mmHg 11/26/24 0701   Date of last wick change 11/25/24 11/26/24 0701   Output (mL) 250 mL 11/26/24 1501          Physical Exam       Neurosurgery Physical Exam    General: well developed, well nourished, no distress.   Head: normocephalic  Neurologic: Alert and oriented. Thought content appropriate.  GCS: Motor: 6/Verbal: 5/Eyes: 4 GCS Total: 15  Mental Status: Awake, Alert, Oriented x 2  Language: aphasia improving  Speech: No dysarthria  Eyes: pupils equal, round, reactive to light with accomodation, EOMI.  Pulmonary: normal respirations, no signs of respiratory distress  Sensory: intact to light touch throughout  Motor Strength: Moves all extremities spontaneously with good tone. Grossly full strength upper and lower extremities. Follows commands x 4 antigravity. No abnormal movements seen.   Skin: Skin is warm, dry and intact.  Incision c/d/I.      Significant Labs:  Recent Labs   Lab 11/26/24  0100 11/27/24  0355     101  --    NA  "147*  147*  --    K 4.1  4.1  --    *  116*  --    CO2 22*  22*  --    BUN 13  13  --    CREATININE 0.6  0.6  --    CALCIUM 8.8  8.8  --    MG 2.4 2.5     Recent Labs   Lab 11/26/24  0100 11/27/24  0355   WBC 15.87* 15.06*   HGB 9.8* 9.4*   HCT 31.7* 30.2*    223     No results for input(s): "LABPT", "INR", "APTT" in the last 48 hours.  Microbiology Results (last 7 days)       ** No results found for the last 168 hours. **          Recent Lab Results         11/27/24  0355        Baso # 0.06       Basophil % 0.4       Differential Method Automated       Eos # 0.1       Eos % 0.7       Gran # (ANC) 10.6       Gran % 70.3       Hematocrit 30.2       Hemoglobin 9.4       Immature Grans (Abs) 0.10  Comment: Mild elevation in immature granulocytes is non specific and   can be seen in a variety of conditions including stress response,   acute inflammation, trauma and pregnancy. Correlation with other   laboratory and clinical findings is essential.         Immature Granulocytes 0.7       Lymph # 2.6       Lymph % 16.9       Magnesium  2.5       MCH 32.3       MCHC 31.1              Mono # 1.7       Mono % 11.0       MPV 11.6       nRBC 0       Phosphorus Level 2.6       Platelet Count 223       RBC 2.91       RDW 14.1       WBC 15.06             All pertinent labs from the last 24 hours have been reviewed.    Significant Diagnostics:  I have reviewed all pertinent imaging results/findings within the past 24 hours.  "

## 2024-11-28 LAB
ALBUMIN SERPL BCP-MCNC: 2.5 G/DL (ref 3.5–5.2)
ALP SERPL-CCNC: 80 U/L (ref 40–150)
ALT SERPL W/O P-5'-P-CCNC: 12 U/L (ref 10–44)
ANION GAP SERPL CALC-SCNC: 8 MMOL/L (ref 8–16)
AST SERPL-CCNC: 17 U/L (ref 10–40)
BASOPHILS # BLD AUTO: 0.05 K/UL (ref 0–0.2)
BASOPHILS NFR BLD: 0.4 % (ref 0–1.9)
BILIRUB SERPL-MCNC: 0.4 MG/DL (ref 0.1–1)
BUN SERPL-MCNC: 18 MG/DL (ref 8–23)
CALCIUM SERPL-MCNC: 9.5 MG/DL (ref 8.7–10.5)
CHLORIDE SERPL-SCNC: 116 MMOL/L (ref 95–110)
CO2 SERPL-SCNC: 21 MMOL/L (ref 23–29)
CREAT SERPL-MCNC: 0.7 MG/DL (ref 0.5–1.4)
DIFFERENTIAL METHOD BLD: ABNORMAL
EOSINOPHIL # BLD AUTO: 0.2 K/UL (ref 0–0.5)
EOSINOPHIL NFR BLD: 1.1 % (ref 0–8)
ERYTHROCYTE [DISTWIDTH] IN BLOOD BY AUTOMATED COUNT: 14.2 % (ref 11.5–14.5)
EST. GFR  (NO RACE VARIABLE): >60 ML/MIN/1.73 M^2
GLUCOSE SERPL-MCNC: 95 MG/DL (ref 70–110)
HCT VFR BLD AUTO: 29.2 % (ref 37–48.5)
HGB BLD-MCNC: 9.1 G/DL (ref 12–16)
IMM GRANULOCYTES # BLD AUTO: 0.09 K/UL (ref 0–0.04)
IMM GRANULOCYTES NFR BLD AUTO: 0.6 % (ref 0–0.5)
LYMPHOCYTES # BLD AUTO: 2.7 K/UL (ref 1–4.8)
LYMPHOCYTES NFR BLD: 18.9 % (ref 18–48)
MCH RBC QN AUTO: 33.1 PG (ref 27–31)
MCHC RBC AUTO-ENTMCNC: 31.2 G/DL (ref 32–36)
MCV RBC AUTO: 106 FL (ref 82–98)
MONOCYTES # BLD AUTO: 1.7 K/UL (ref 0.3–1)
MONOCYTES NFR BLD: 11.8 % (ref 4–15)
NEUTROPHILS # BLD AUTO: 9.4 K/UL (ref 1.8–7.7)
NEUTROPHILS NFR BLD: 67.2 % (ref 38–73)
NRBC BLD-RTO: 0 /100 WBC
PLATELET # BLD AUTO: 230 K/UL (ref 150–450)
PMV BLD AUTO: 10.8 FL (ref 9.2–12.9)
POTASSIUM SERPL-SCNC: 4.5 MMOL/L (ref 3.5–5.1)
PROT SERPL-MCNC: 6 G/DL (ref 6–8.4)
RBC # BLD AUTO: 2.75 M/UL (ref 4–5.4)
SODIUM SERPL-SCNC: 145 MMOL/L (ref 136–145)
WBC # BLD AUTO: 14.04 K/UL (ref 3.9–12.7)

## 2024-11-28 PROCEDURE — 85025 COMPLETE CBC W/AUTO DIFF WBC: CPT | Mod: HCNC

## 2024-11-28 PROCEDURE — 25000003 PHARM REV CODE 250: Mod: HCNC | Performed by: PHYSICIAN ASSISTANT

## 2024-11-28 PROCEDURE — 11000001 HC ACUTE MED/SURG PRIVATE ROOM: Mod: HCNC

## 2024-11-28 PROCEDURE — 25000003 PHARM REV CODE 250: Mod: HCNC

## 2024-11-28 PROCEDURE — 36415 COLL VENOUS BLD VENIPUNCTURE: CPT | Mod: HCNC

## 2024-11-28 PROCEDURE — 80053 COMPREHEN METABOLIC PANEL: CPT | Mod: HCNC

## 2024-11-28 PROCEDURE — 63600175 PHARM REV CODE 636 W HCPCS: Mod: HCNC | Performed by: NURSE PRACTITIONER

## 2024-11-28 PROCEDURE — 25000003 PHARM REV CODE 250: Mod: HCNC | Performed by: PSYCHIATRY & NEUROLOGY

## 2024-11-28 RX ADMIN — HYDRALAZINE HYDROCHLORIDE 50 MG: 25 TABLET ORAL at 09:11

## 2024-11-28 RX ADMIN — ATORVASTATIN CALCIUM 20 MG: 20 TABLET, FILM COATED ORAL at 09:11

## 2024-11-28 RX ADMIN — HYDRALAZINE HYDROCHLORIDE 50 MG: 25 TABLET ORAL at 05:11

## 2024-11-28 RX ADMIN — GABAPENTIN 600 MG: 250 SOLUTION ORAL at 09:11

## 2024-11-28 RX ADMIN — LEVETIRACETAM 1000 MG: 500 SOLUTION ORAL at 09:11

## 2024-11-28 RX ADMIN — GABAPENTIN 600 MG: 250 SOLUTION ORAL at 01:11

## 2024-11-28 RX ADMIN — CITALOPRAM HYDROBROMIDE 10 MG: 10 TABLET ORAL at 09:11

## 2024-11-28 RX ADMIN — GABAPENTIN 600 MG: 250 SOLUTION ORAL at 05:11

## 2024-11-28 RX ADMIN — LISINOPRIL 40 MG: 20 TABLET ORAL at 09:11

## 2024-11-28 RX ADMIN — HYDRALAZINE HYDROCHLORIDE 50 MG: 25 TABLET ORAL at 01:11

## 2024-11-28 RX ADMIN — HEPARIN SODIUM 7500 UNITS: 5000 INJECTION INTRAVENOUS; SUBCUTANEOUS at 09:11

## 2024-11-28 RX ADMIN — HEPARIN SODIUM 7500 UNITS: 5000 INJECTION INTRAVENOUS; SUBCUTANEOUS at 05:11

## 2024-11-28 RX ADMIN — HEPARIN SODIUM 7500 UNITS: 5000 INJECTION INTRAVENOUS; SUBCUTANEOUS at 01:11

## 2024-11-28 NOTE — PROGRESS NOTES
"[facility] - Neurosurgery ([facility])  Adult Nutrition  Progress Note    SUMMARY       Recommendations  --Continuous TF recommendation: Isosource 1.5 @ 50 mL/hr to provide 1200 mL total volume, 1800 kcal, 211 g CHO, 82 protein, 18 fiber, 917 mL free water, 120% DRI         --100 mL flush q4 hrs to provide additional 600 mL free water (Total 1517 mL)    --Continue Pureed (IDDSI Level 4)     Goals: Meet % EEN/EPN by RD follow up.  Nutrition Goal Status: new   Communication of RD Recs: other (comment) (POC)    Assessment and Plan    Nutrition Problem  Swallowing difficulty     Related to (etiology):   Cerebral aneurysm     Signs and Symptoms (as evidenced by):   SLP assessment     Interventions/Recommendations (treatment strategy):  Collaboration of nutrition care with other providers   EN   Pureed (IDDSI Level 4) diet     Nutrition Diagnosis Status:   New      Reason for Assessment    Reason For Assessment: RD follow-up  Diagnosis: stroke/CVA (Cerebral aneurysm)  General Information Comments: RD follow-up. Noted pt receiving Isosource 1.5 @ 30 mL/hr with goal rate of 50 mL/hr. Also noted pt on Pureed diet and consuming primarily 100% per chart. Calorie count is also in progress.     Nutrition Discharge Planning: Cardiac diet    Nutrition Risk Screen    Nutrition Risk Screen: tube feeding or parenteral nutrition    Nutrition/Diet History    Spiritual, Cultural Beliefs, Scientology Practices, Values that Affect Care: no  Food Allergies: NKFA    Anthropometrics    Temp: 98.2 °F (36.8 °C)  Height Method: Stated  Height: 5' 6" (167.6 cm)  Height (inches): 66 in  Weight Method: Standard Scale  Weight: 123.4 kg (272 lb 0.8 oz)  Weight (lb): 272.05 lb  Ideal Body Weight (IBW), Female: 130 lb  % Ideal Body Weight, Female (lb): 209.27 %  BMI (Calculated): 43.9  BMI Grade: greater than 40 - morbid obesity       Lab/Procedures/Meds    Pertinent Labs Reviewed: reviewed - hemoglobin 9.4, hematocrit 30.2, , MCH 32.3, MCHC " 31.1, Na 147, P 2.6    Pertinent Medications Reviewed: reviewed - atorvastatin, gabapentin    Estimated/Assessed Needs    Weight Used For Calorie Calculations: 59 kg (130 lb 1.1 oz)  Energy Calorie Requirements (kcal): 2021-1930 kcal/day (25-30 kcal/kg IBW)  Energy Need Method: Kcal/kg  Protein Requirements: 71-83 g/day (1.2-1.4 g/kg IBW)  Weight Used For Protein Calculations: 59 kg (130 lb 1.1 oz)  Fluid Requirements (mL): Per MD  Estimated Fluid Requirement Method: RDA Method  RDA Method (mL): 1475  CHO Requirement: 184-221 g/day    Nutrition Prescription Ordered    Current Diet Order: Pureed (IDDSI Level 4)  Current Nutrition Support Formula Ordered: Isosource 1.5  Current Nutrition Support Rate Ordered: 50 (ml)    Evaluation of Received Nutrient/Fluid Intake    % Intake of Estimated Energy Needs: 75 - 100 %  % Meal Intake: 75 - 100 %    Nutrition Risk    Level of Risk/Frequency of Follow-up:  (1x/week)     Monitor and Evaluation    Food and Nutrient Intake: food and beverage intake, energy intake  Food and Nutrient Adminstration: diet order  Anthropometric Measurements: body mass index, weight change  Biochemical Data, Medical Tests and Procedures: lipid profile, inflammatory profile, glucose/endocrine profile, electrolyte and renal panel, gastrointestinal profile  Nutrition-Focused Physical Findings: overall appearance     Nutrition Follow-Up    RD Follow-up?: Yes    Ivette Henry, Registration Eligible, Provisional LDN

## 2024-11-28 NOTE — SUBJECTIVE & OBJECTIVE
Interval History: 11/28: speech gradually improving, able to name keys and pen and oriented x2. still has NGT, eating 75% lunch and 25% dinner per RN. I/O +150. TCDs yesterday R 2.33, L 3.46 peak velocity 129.     Medications:  Continuous Infusions:  Scheduled Meds:   atorvastatin  20 mg Per NG tube Daily    citalopram  10 mg Per NG tube Daily    gabapentin  600 mg Per NG tube Q8H    heparin (porcine)  7,500 Units Subcutaneous Q8H    hydrALAZINE  50 mg Per NG tube Q8H    levetiracetam  1,000 mg Per NG tube BID    lisinopriL  40 mg Per NG tube Daily     PRN Meds:  Current Facility-Administered Medications:     acetaminophen, 650 mg, Per NG tube, Q4H PRN    bisacodyL, 10 mg, Rectal, Daily PRN    hydrALAZINE, 10 mg, Intravenous, Q4H PRN    labetalol, 10 mg, Intravenous, Q4H PRN    morphine, 2 mg, Intravenous, Q6H PRN    naloxone, 0.4 mg, Intravenous, PRN    ondansetron, 4 mg, Intravenous, Q12H PRN    oxyCODONE, 5 mg, Per NG tube, Q6H PRN    sodium chloride 0.9%, 10 mL, Intravenous, PRN     Review of Systems  Objective:     Weight: 123.4 kg (272 lb)  Body mass index is 43.9 kg/m².  Vital Signs (Most Recent):  Temp: 98.2 °F (36.8 °C) (11/28/24 0718)  Pulse: 93 (11/28/24 0718)  Resp: 20 (11/28/24 0718)  BP: (!) 148/67 (11/28/24 0718)  SpO2: 97 % (11/28/24 0718) Vital Signs (24h Range):  Temp:  [98.1 °F (36.7 °C)-98.8 °F (37.1 °C)] 98.2 °F (36.8 °C)  Pulse:  [66-93] 93  Resp:  [16-20] 20  SpO2:  [94 %-97 %] 97 %  BP: (124-168)/(58-74) 148/67                              NG/OG Tube 11/21/24 1800 Right nostril (Active)   Placement Check placement verified by x-ray 11/28/24 0400   Tolerance no signs/symptoms of discomfort 11/28/24 0400   Securement secured to commercial device;secured to nostril center 11/28/24 0400   Clamp Status/Tolerance unclamped 11/28/24 0400   Suction Setting/Drainage Method suction at the bedside 11/26/24 1600   Insertion Site Appearance no redness, warmth, tenderness, skin breakdown, drainage 11/28/24  "0400   Drainage None 11/28/24 0400   Flush/Irrigation flushed w/;water 11/28/24 0400   Feeding Type continuous 11/28/24 0400   Feeding Action feeding continued 11/28/24 0400   Current Rate (mL/hr) 50 mL/hr 11/27/24 2000   Goal Rate (mL/hr) 50 mL/hr 11/27/24 2000   Intake (mL) 30 mL 11/26/24 2020   Water Bolus (mL) 250 mL 11/28/24 0600   Rate Formula Tube Feeding (mL/hr) 50 mL/hr 11/27/24 2000   Formula Name isosource 1.5 11/28/24 0400   Intake (mL) - Formula Tube Feeding 200 11/28/24 0400   Residual Amount (ml) 0 ml 11/22/24 1505       Female External Urinary Catheter w/ Suction 11/21/24 1400 (Active)   Skin no redness;no breakdown;perineum cleansed w/ soap and water 11/28/24 0400   Tolerance no signs/symptoms of discomfort 11/28/24 0400   Suction Continuous suction at 40 mmHg 11/27/24 0800   Date of last wick change 11/27/24 11/27/24 2000   Time of last wick change 2000 11/27/24 2000   Output (mL) 250 mL 11/26/24 1501          Physical Exam         Neurosurgery Physical Exam    General: well developed, well nourished, no distress.   Head: normocephalic  Neurologic: Alert and oriented. Thought content appropriate.  GCS: Motor: 6/Verbal: 4/Eyes: 4 GCS Total: 14  Mental Status: Awake, Alert, Oriented x 2  Language: aphasia improving  Speech: No dysarthria  Eyes: pupils equal, round, reactive to light with accomodation, EOMI.  Pulmonary: normal respirations, no signs of respiratory distress  Sensory: intact to light touch throughout  Motor Strength: Moves all extremities spontaneously with good tone. Grossly full strength upper and lower extremities. Follows commands x 4 antigravity. No abnormal movements seen.   Skin: Skin is warm, dry and intact.  Incision c/d/I.    Significant Labs:  Recent Labs   Lab 11/27/24  0355   MG 2.5     Recent Labs   Lab 11/27/24  0355   WBC 15.06*   HGB 9.4*   HCT 30.2*        No results for input(s): "LABPT", "INR", "APTT" in the last 48 hours.  Microbiology Results (last 7 days)  "      ** No results found for the last 168 hours. **          All pertinent labs from the last 24 hours have been reviewed.    Significant Diagnostics:  CT: No results found in the last 24 hours.  MRI: No results found in the last 24 hours.

## 2024-11-28 NOTE — PROGRESS NOTES
Pt pulled out NG tube. Spoke with Sam Santiago, resident. OK to not replace tube at this time as we are doing the calorie count and patient intake has improved today.       Yadira Jackson RN

## 2024-11-28 NOTE — ASSESSMENT & PLAN NOTE
Isabel Coats is a 73-year-old female past medical history of hypertension, obstructive sleep apnea on CPAP, suggestive heart failure, obesity, history of gastric cancer status post chemoradiation and DVT in currently on Eliquis. She presents to [facility] s/p L pterional crani on 11/20 with 7 clips applied in proximity to large irregular L MCA bifurcation aneurysm and other adjacent small aneurysms, 1 clip applied across neck of aneurysm just proximal to anterior choroidal.  After surgery she seemed to be gradually waking up appropriately overnight, then AM of POD1 around shift change had an unexplained decline in exam during which stat CT showed IPH in the right side of her cerebellum. She became less responsive as well as developed a L gaze preference and dense aphasia. She had a perfusion scan with questionable perfusion deficit to the left anterior temporal lobe and milrinone was started with seemingly some clinical improvement. Now on POD5 has been weaned off milrinone, gaze preference resolved, following commands x4 antigravity, able to state name but still remains aphasic.    Imaging:  CTA Head 11/20: expected post-op changes  CTH 11/21 8 AM: right sided cerebellar IPH  CTA 11/21 10 AM: stable IPH, possible frontal contusion  CTP 11/21 4 PM: possible left anterior temporal perfusion deficit in territory of branches distal to MCA clips   MRI brain w wo 11/22: no strokes, post op changes, stable cerebellar bleed. some edema in right frontal lobe.    Plans:  -TTF 11/26  -q4h neurochecks/vitals  -SBP <160  -drain removed 11/25  -Continue to monitor respiratory status, on room air  -keppra 1g BID postop. cEEG negative during initial AMS POD1  -now has been weaned off milrinone, TCDs with mild increase left MCA velocities. Continue daily TCDs. Keep euvolemic   -PT/OT/OOB  -SLP - advanced to pureed/thin liquids. Hold TF during day. Resume at night if not eating meals during day. Calorie count ordered. RD  following.  -Continue to monitor clinically, notify NSGY immediately with any changes in neuro status   -SQH DVT ppx    Dispo: pending placement and NGT removal/diet advancement

## 2024-11-28 NOTE — PROGRESS NOTES
Dario Glover - Neurosurgery ([facility])  Neurosurgery  Progress Note    Subjective:     History of Present Illness: Isabel Coats is a 73-year-old female past medical history of hypertension, obstructive sleep apnea on CPAP, suggestive heart failure, obesity, history of gastric cancer status post chemoradiation and DVT in currently on Eliquis. She presents to [facility] L pterional crani, 7 clips applied in proximity to large irregular L MCA bifurcation aneurysm and other adjacent small aneurysms, 1 clip applied across neck of aneurysm just proximal to anterior choroidal.  She also has a current smoker she was initially referred to Neurology for workup for dizziness. She had been complaining of vertigo multiple times a day for the past several months, associated with shortness of breath, palpitation, nausea, headache, and feeling faint. She is admitted to [facility] for a higher level of care.     Post-Op Info:  Procedure(s) (LRB):  CRANIOTOMY, WITH ANEURYSM CLIPPING W/ STEALTH (Left)  CRANIOTOMY, FOR ANEURYSM OF VERTEBROBASILAR OR CAROTID CIRCULATION (Left)  DISSECTION, NERVE, USING OPERATING MICROSCOPE (Left)   8 Days Post-Op   Interval History: 11/28: speech gradually improving, able to name keys and pen and oriented x2. still has NGT, eating 75% lunch and 25% dinner per RN. I/O +150. TCDs yesterday R 2.33, L 3.46 peak velocity 129.     Medications:  Continuous Infusions:  Scheduled Meds:   atorvastatin  20 mg Per NG tube Daily    citalopram  10 mg Per NG tube Daily    gabapentin  600 mg Per NG tube Q8H    heparin (porcine)  7,500 Units Subcutaneous Q8H    hydrALAZINE  50 mg Per NG tube Q8H    levetiracetam  1,000 mg Per NG tube BID    lisinopriL  40 mg Per NG tube Daily     PRN Meds:  Current Facility-Administered Medications:     acetaminophen, 650 mg, Per NG tube, Q4H PRN    bisacodyL, 10 mg, Rectal, Daily PRN    hydrALAZINE, 10 mg, Intravenous, Q4H PRN    labetalol, 10 mg, Intravenous, Q4H PRN    morphine, 2 mg, Intravenous,  Q6H PRN    naloxone, 0.4 mg, Intravenous, PRN    ondansetron, 4 mg, Intravenous, Q12H PRN    oxyCODONE, 5 mg, Per NG tube, Q6H PRN    sodium chloride 0.9%, 10 mL, Intravenous, PRN     Review of Systems  Objective:     Weight: 123.4 kg (272 lb)  Body mass index is 43.9 kg/m².  Vital Signs (Most Recent):  Temp: 98.2 °F (36.8 °C) (11/28/24 0718)  Pulse: 93 (11/28/24 0718)  Resp: 20 (11/28/24 0718)  BP: (!) 148/67 (11/28/24 0718)  SpO2: 97 % (11/28/24 0718) Vital Signs (24h Range):  Temp:  [98.1 °F (36.7 °C)-98.8 °F (37.1 °C)] 98.2 °F (36.8 °C)  Pulse:  [66-93] 93  Resp:  [16-20] 20  SpO2:  [94 %-97 %] 97 %  BP: (124-168)/(58-74) 148/67                              NG/OG Tube 11/21/24 1800 Right nostril (Active)   Placement Check placement verified by x-ray 11/28/24 0400   Tolerance no signs/symptoms of discomfort 11/28/24 0400   Securement secured to commercial device;secured to nostril center 11/28/24 0400   Clamp Status/Tolerance unclamped 11/28/24 0400   Suction Setting/Drainage Method suction at the bedside 11/26/24 1600   Insertion Site Appearance no redness, warmth, tenderness, skin breakdown, drainage 11/28/24 0400   Drainage None 11/28/24 0400   Flush/Irrigation flushed w/;water 11/28/24 0400   Feeding Type continuous 11/28/24 0400   Feeding Action feeding continued 11/28/24 0400   Current Rate (mL/hr) 50 mL/hr 11/27/24 2000   Goal Rate (mL/hr) 50 mL/hr 11/27/24 2000   Intake (mL) 30 mL 11/26/24 2020   Water Bolus (mL) 250 mL 11/28/24 0600   Rate Formula Tube Feeding (mL/hr) 50 mL/hr 11/27/24 2000   Formula Name isosource 1.5 11/28/24 0400   Intake (mL) - Formula Tube Feeding 200 11/28/24 0400   Residual Amount (ml) 0 ml 11/22/24 1505       Female External Urinary Catheter w/ Suction 11/21/24 1400 (Active)   Skin no redness;no breakdown;perineum cleansed w/ soap and water 11/28/24 0400   Tolerance no signs/symptoms of discomfort 11/28/24 0400   Suction Continuous suction at 40 mmHg 11/27/24 0800   Date of last  "wick change 11/27/24 11/27/24 2000   Time of last wick change 2000 11/27/24 2000   Output (mL) 250 mL 11/26/24 1501          Physical Exam         Neurosurgery Physical Exam    General: well developed, well nourished, no distress.   Head: normocephalic  Neurologic: Alert and oriented. Thought content appropriate.  GCS: Motor: 6/Verbal: 4/Eyes: 4 GCS Total: 14  Mental Status: Awake, Alert, Oriented x 2  Language: aphasia improving  Speech: No dysarthria  Eyes: pupils equal, round, reactive to light with accomodation, EOMI.  Pulmonary: normal respirations, no signs of respiratory distress  Sensory: intact to light touch throughout  Motor Strength: Moves all extremities spontaneously with good tone. Grossly full strength upper and lower extremities. Follows commands x 4 antigravity. No abnormal movements seen.   Skin: Skin is warm, dry and intact.  Incision c/d/I.    Significant Labs:  Recent Labs   Lab 11/27/24  0355   MG 2.5     Recent Labs   Lab 11/27/24  0355   WBC 15.06*   HGB 9.4*   HCT 30.2*        No results for input(s): "LABPT", "INR", "APTT" in the last 48 hours.  Microbiology Results (last 7 days)       ** No results found for the last 168 hours. **          All pertinent labs from the last 24 hours have been reviewed.    Significant Diagnostics:  CT: No results found in the last 24 hours.  MRI: No results found in the last 24 hours.  Assessment/Plan:     * Cerebral aneurysm  Isabel Coats is a 73-year-old female past medical history of hypertension, obstructive sleep apnea on CPAP, suggestive heart failure, obesity, history of gastric cancer status post chemoradiation and DVT in currently on Eliquis. She presents to [facility] s/p L pterional crani on 11/20 with 7 clips applied in proximity to large irregular L MCA bifurcation aneurysm and other adjacent small aneurysms, 1 clip applied across neck of aneurysm just proximal to anterior choroidal.  After surgery she seemed to be gradually waking up " appropriately overnight, then AM of POD1 around shift change had an unexplained decline in exam during which stat CT showed IPH in the right side of her cerebellum. She became less responsive as well as developed a L gaze preference and dense aphasia. She had a perfusion scan with questionable perfusion deficit to the left anterior temporal lobe and milrinone was started with seemingly some clinical improvement. Now on POD5 has been weaned off milrinone, gaze preference resolved, following commands x4 antigravity, able to state name but still remains aphasic.    Imaging:  CTA Head 11/20: expected post-op changes  CTH 11/21 8 AM: right sided cerebellar IPH  CTA 11/21 10 AM: stable IPH, possible frontal contusion  CTP 11/21 4 PM: possible left anterior temporal perfusion deficit in territory of branches distal to MCA clips   MRI brain w wo 11/22: no strokes, post op changes, stable cerebellar bleed. some edema in right frontal lobe.    Plans:  -TTF 11/26  -q4h neurochecks/vitals  -SBP <160  -drain removed 11/25  -Continue to monitor respiratory status, on room air  -keppra 1g BID postop. cEEG negative during initial AMS POD1  -now has been weaned off milrinone, TCDs with mild increase left MCA velocities. Continue daily TCDs. Keep euvolemic   -PT/OT/OOB  -SLP - advanced to pureed/thin liquids. Hold TF during day. Resume at night if not eating meals during day. Calorie count ordered. RD following.  -Continue to monitor clinically, notify NSGY immediately with any changes in neuro status   -SQH DVT ppx    Dispo: pending placement and NGT removal/diet advancement        Indu Schultz MD  Neurosurgery  Dario Glover - Neurosurgery ([facility])

## 2024-11-28 NOTE — PLAN OF CARE
Recommendations  --Continuous TF recommendation: Isosource 1.5 @ 50 mL/hr to provide 1200 mL total volume, 1800 kcal, 211 g CHO, 82 protein, 18 fiber, 917 mL free water, 120% DRI         --100 mL flush q4 hrs to provide additional 600 mL free water (Total 1517 mL)    --Continue Pureed (IDDSI Level 4)     Goals: Meet % EEN/EPN by RD follow up.  Nutrition Goal Status: new   Communication of RD Recs: other (comment) (POC)

## 2024-11-29 ENCOUNTER — TELEPHONE (OUTPATIENT)
Dept: HEMATOLOGY/ONCOLOGY | Facility: CLINIC | Age: 74
End: 2024-11-29
Payer: MEDICARE

## 2024-11-29 PROBLEM — L02.91 ABSCESS: Status: ACTIVE | Noted: 2024-11-29

## 2024-11-29 LAB
ALBUMIN SERPL BCP-MCNC: 2.7 G/DL (ref 3.5–5.2)
ALP SERPL-CCNC: 85 U/L (ref 40–150)
ALT SERPL W/O P-5'-P-CCNC: 14 U/L (ref 10–44)
ANION GAP SERPL CALC-SCNC: 8 MMOL/L (ref 8–16)
AST SERPL-CCNC: 19 U/L (ref 10–40)
BASOPHILS # BLD AUTO: 0.04 K/UL (ref 0–0.2)
BASOPHILS NFR BLD: 0.3 % (ref 0–1.9)
BILIRUB SERPL-MCNC: 0.5 MG/DL (ref 0.1–1)
BUN SERPL-MCNC: 20 MG/DL (ref 8–23)
CALCIUM SERPL-MCNC: 9.8 MG/DL (ref 8.7–10.5)
CHLORIDE SERPL-SCNC: 115 MMOL/L (ref 95–110)
CO2 SERPL-SCNC: 23 MMOL/L (ref 23–29)
CREAT SERPL-MCNC: 0.7 MG/DL (ref 0.5–1.4)
DIFFERENTIAL METHOD BLD: ABNORMAL
EOSINOPHIL # BLD AUTO: 0.2 K/UL (ref 0–0.5)
EOSINOPHIL NFR BLD: 1.7 % (ref 0–8)
ERYTHROCYTE [DISTWIDTH] IN BLOOD BY AUTOMATED COUNT: 14.3 % (ref 11.5–14.5)
EST. GFR  (NO RACE VARIABLE): >60 ML/MIN/1.73 M^2
GLUCOSE SERPL-MCNC: 105 MG/DL (ref 70–110)
HCT VFR BLD AUTO: 28.4 % (ref 37–48.5)
HGB BLD-MCNC: 8.9 G/DL (ref 12–16)
IMM GRANULOCYTES # BLD AUTO: 0.06 K/UL (ref 0–0.04)
IMM GRANULOCYTES NFR BLD AUTO: 0.5 % (ref 0–0.5)
LYMPHOCYTES # BLD AUTO: 2.9 K/UL (ref 1–4.8)
LYMPHOCYTES NFR BLD: 22.9 % (ref 18–48)
MAGNESIUM SERPL-MCNC: 2.4 MG/DL (ref 1.6–2.6)
MCH RBC QN AUTO: 32.7 PG (ref 27–31)
MCHC RBC AUTO-ENTMCNC: 31.3 G/DL (ref 32–36)
MCV RBC AUTO: 104 FL (ref 82–98)
MONOCYTES # BLD AUTO: 1.7 K/UL (ref 0.3–1)
MONOCYTES NFR BLD: 13.1 % (ref 4–15)
NEUTROPHILS # BLD AUTO: 7.8 K/UL (ref 1.8–7.7)
NEUTROPHILS NFR BLD: 61.5 % (ref 38–73)
NRBC BLD-RTO: 0 /100 WBC
PHOSPHATE SERPL-MCNC: 2.6 MG/DL (ref 2.7–4.5)
PLATELET # BLD AUTO: 254 K/UL (ref 150–450)
PMV BLD AUTO: 10.9 FL (ref 9.2–12.9)
POTASSIUM SERPL-SCNC: 4.3 MMOL/L (ref 3.5–5.1)
PROT SERPL-MCNC: 6.4 G/DL (ref 6–8.4)
RBC # BLD AUTO: 2.72 M/UL (ref 4–5.4)
SODIUM SERPL-SCNC: 146 MMOL/L (ref 136–145)
WBC # BLD AUTO: 12.61 K/UL (ref 3.9–12.7)

## 2024-11-29 PROCEDURE — 92507 TX SP LANG VOICE COMM INDIV: CPT | Mod: HCNC

## 2024-11-29 PROCEDURE — 84100 ASSAY OF PHOSPHORUS: CPT | Mod: HCNC | Performed by: PHYSICIAN ASSISTANT

## 2024-11-29 PROCEDURE — 99900035 HC TECH TIME PER 15 MIN (STAT): Mod: HCNC

## 2024-11-29 PROCEDURE — 99024 POSTOP FOLLOW-UP VISIT: CPT | Mod: HCNC,,, | Performed by: PHYSICIAN ASSISTANT

## 2024-11-29 PROCEDURE — 36415 COLL VENOUS BLD VENIPUNCTURE: CPT | Mod: HCNC | Performed by: PHYSICIAN ASSISTANT

## 2024-11-29 PROCEDURE — 94761 N-INVAS EAR/PLS OXIMETRY MLT: CPT | Mod: HCNC

## 2024-11-29 PROCEDURE — 25000003 PHARM REV CODE 250: Mod: HCNC | Performed by: PSYCHIATRY & NEUROLOGY

## 2024-11-29 PROCEDURE — 92526 ORAL FUNCTION THERAPY: CPT | Mod: HCNC

## 2024-11-29 PROCEDURE — 83735 ASSAY OF MAGNESIUM: CPT | Mod: HCNC | Performed by: PHYSICIAN ASSISTANT

## 2024-11-29 PROCEDURE — 25000003 PHARM REV CODE 250: Mod: HCNC | Performed by: PHYSICIAN ASSISTANT

## 2024-11-29 PROCEDURE — 63600175 PHARM REV CODE 636 W HCPCS: Mod: HCNC | Performed by: NURSE PRACTITIONER

## 2024-11-29 PROCEDURE — 80053 COMPREHEN METABOLIC PANEL: CPT | Mod: HCNC | Performed by: PHYSICIAN ASSISTANT

## 2024-11-29 PROCEDURE — 85025 COMPLETE CBC W/AUTO DIFF WBC: CPT | Mod: HCNC | Performed by: PHYSICIAN ASSISTANT

## 2024-11-29 PROCEDURE — 11000001 HC ACUTE MED/SURG PRIVATE ROOM: Mod: HCNC

## 2024-11-29 RX ORDER — HYDRALAZINE HYDROCHLORIDE 25 MG/1
50 TABLET, FILM COATED ORAL EVERY 8 HOURS
Status: DISCONTINUED | OUTPATIENT
Start: 2024-11-29 | End: 2024-12-16 | Stop reason: HOSPADM

## 2024-11-29 RX ORDER — LISINOPRIL 20 MG/1
40 TABLET ORAL DAILY
Status: DISCONTINUED | OUTPATIENT
Start: 2024-11-29 | End: 2024-12-11

## 2024-11-29 RX ORDER — OXYCODONE HYDROCHLORIDE 5 MG/1
5 TABLET ORAL EVERY 6 HOURS PRN
Status: DISCONTINUED | OUTPATIENT
Start: 2024-11-29 | End: 2024-12-16 | Stop reason: HOSPADM

## 2024-11-29 RX ORDER — CITALOPRAM 10 MG/1
10 TABLET ORAL DAILY
Status: DISCONTINUED | OUTPATIENT
Start: 2024-11-29 | End: 2024-12-16 | Stop reason: HOSPADM

## 2024-11-29 RX ORDER — SULFAMETHOXAZOLE AND TRIMETHOPRIM 800; 160 MG/1; MG/1
1 TABLET ORAL 2 TIMES DAILY
Status: COMPLETED | OUTPATIENT
Start: 2024-11-29 | End: 2024-12-09

## 2024-11-29 RX ORDER — ATORVASTATIN CALCIUM 20 MG/1
20 TABLET, FILM COATED ORAL DAILY
Status: DISCONTINUED | OUTPATIENT
Start: 2024-11-29 | End: 2024-12-16 | Stop reason: HOSPADM

## 2024-11-29 RX ORDER — LEVETIRACETAM 100 MG/ML
1000 SOLUTION ORAL 2 TIMES DAILY
Status: DISCONTINUED | OUTPATIENT
Start: 2024-11-29 | End: 2024-12-16 | Stop reason: HOSPADM

## 2024-11-29 RX ORDER — GABAPENTIN 250 MG/5ML
600 SOLUTION ORAL EVERY 8 HOURS
Status: DISCONTINUED | OUTPATIENT
Start: 2024-11-29 | End: 2024-12-16 | Stop reason: HOSPADM

## 2024-11-29 RX ORDER — ACETAMINOPHEN 325 MG/1
650 TABLET ORAL EVERY 4 HOURS PRN
Status: DISCONTINUED | OUTPATIENT
Start: 2024-11-29 | End: 2024-12-16 | Stop reason: HOSPADM

## 2024-11-29 RX ADMIN — GABAPENTIN 600 MG: 250 SOLUTION ORAL at 06:11

## 2024-11-29 RX ADMIN — HEPARIN SODIUM 7500 UNITS: 5000 INJECTION INTRAVENOUS; SUBCUTANEOUS at 02:11

## 2024-11-29 RX ADMIN — HEPARIN SODIUM 7500 UNITS: 5000 INJECTION INTRAVENOUS; SUBCUTANEOUS at 06:11

## 2024-11-29 RX ADMIN — SULFAMETHOXAZOLE AND TRIMETHOPRIM 1 TABLET: 800; 160 TABLET ORAL at 09:11

## 2024-11-29 RX ADMIN — LISINOPRIL 40 MG: 20 TABLET ORAL at 09:11

## 2024-11-29 RX ADMIN — OXYCODONE 5 MG: 5 TABLET ORAL at 02:11

## 2024-11-29 RX ADMIN — ATORVASTATIN CALCIUM 20 MG: 20 TABLET, FILM COATED ORAL at 09:11

## 2024-11-29 RX ADMIN — GABAPENTIN 600 MG: 250 SOLUTION ORAL at 09:11

## 2024-11-29 RX ADMIN — LEVETIRACETAM 1000 MG: 500 SOLUTION ORAL at 09:11

## 2024-11-29 RX ADMIN — HEPARIN SODIUM 7500 UNITS: 5000 INJECTION INTRAVENOUS; SUBCUTANEOUS at 09:11

## 2024-11-29 RX ADMIN — HYDRALAZINE HYDROCHLORIDE 50 MG: 25 TABLET ORAL at 06:11

## 2024-11-29 RX ADMIN — CITALOPRAM HYDROBROMIDE 10 MG: 10 TABLET ORAL at 09:11

## 2024-11-29 RX ADMIN — HYDRALAZINE HYDROCHLORIDE 50 MG: 25 TABLET ORAL at 09:11

## 2024-11-29 RX ADMIN — GABAPENTIN 600 MG: 250 SOLUTION ORAL at 02:11

## 2024-11-29 RX ADMIN — HYDRALAZINE HYDROCHLORIDE 50 MG: 25 TABLET ORAL at 02:11

## 2024-11-29 NOTE — PROGRESS NOTES
Dario Glover - Neurosurgery ([facility])  Neurosurgery  Progress Note    Subjective:     History of Present Illness: Isabel Coats is a 73-year-old female past medical history of hypertension, obstructive sleep apnea on CPAP, suggestive heart failure, obesity, history of gastric cancer status post chemoradiation and DVT in currently on Eliquis. She presents to [facility] L pterional crani, 7 clips applied in proximity to large irregular L MCA bifurcation aneurysm and other adjacent small aneurysms, 1 clip applied across neck of aneurysm just proximal to anterior choroidal.  She also has a current smoker she was initially referred to Neurology for workup for dizziness. She had been complaining of vertigo multiple times a day for the past several months, associated with shortness of breath, palpitation, nausea, headache, and feeling faint. She is admitted to [facility] for a higher level of care.     Post-Op Info:  Procedure(s) (LRB):  CRANIOTOMY, WITH ANEURYSM CLIPPING W/ STEALTH (Left)  CRANIOTOMY, FOR ANEURYSM OF VERTEBROBASILAR OR CAROTID CIRCULATION (Left)  DISSECTION, NERVE, USING OPERATING MICROSCOPE (Left)   9 Days Post-Op   Interval History: MARYANNEON. AFVSS. TCDs today pending. NGT removed overnight by patient. +245 I/O during day shift yesterday, undocumented overnight. Encourage PO fluids since NGT out. Tolerating current recommended diet per SLP. Exam stable. Pending placement.    Medications:  Continuous Infusions:  Scheduled Meds:   atorvastatin  20 mg Oral Daily    citalopram  10 mg Oral Daily    gabapentin  600 mg Oral Q8H    heparin (porcine)  7,500 Units Subcutaneous Q8H    hydrALAZINE  50 mg Oral Q8H    levetiracetam  1,000 mg Oral BID    lisinopriL  40 mg Oral Daily     PRN Meds:  Current Facility-Administered Medications:     acetaminophen, 650 mg, Oral, Q4H PRN    bisacodyL, 10 mg, Rectal, Daily PRN    hydrALAZINE, 10 mg, Intravenous, Q4H PRN    labetalol, 10 mg, Intravenous, Q4H PRN    morphine, 2 mg, Intravenous,  Q6H PRN    naloxone, 0.4 mg, Intravenous, PRN    ondansetron, 4 mg, Intravenous, Q12H PRN    oxyCODONE, 5 mg, Oral, Q6H PRN    sodium chloride 0.9%, 10 mL, Intravenous, PRN     Review of Systems  Objective:     Weight: 123.4 kg (272 lb 0.8 oz)  Body mass index is 43.91 kg/m².  Vital Signs (Most Recent):  Temp: 98.2 °F (36.8 °C) (11/29/24 0810)  Pulse: 77 (11/29/24 0810)  Resp: 16 (11/29/24 0810)  BP: (!) 145/66 (11/29/24 0810)  SpO2: 95 % (11/29/24 0810) Vital Signs (24h Range):  Temp:  [98 °F (36.7 °C)-99.2 °F (37.3 °C)] 98.2 °F (36.8 °C)  Pulse:  [66-77] 77  Resp:  [16-18] 16  SpO2:  [93 %-95 %] 95 %  BP: (128-153)/(64-74) 145/66                              NG/OG Tube 11/21/24 1800 Right nostril (Active)   Placement Check placement verified by x-ray 11/28/24 0400   Tolerance no signs/symptoms of discomfort 11/28/24 0400   Securement secured to commercial device;secured to nostril center 11/28/24 0400   Clamp Status/Tolerance unclamped 11/28/24 0400   Suction Setting/Drainage Method suction at the bedside 11/26/24 1600   Insertion Site Appearance no redness, warmth, tenderness, skin breakdown, drainage 11/28/24 0400   Drainage None 11/28/24 0400   Flush/Irrigation flushed w/;water 11/28/24 0400   Feeding Type continuous 11/28/24 0400   Feeding Action feeding continued 11/28/24 0400   Current Rate (mL/hr) 50 mL/hr 11/27/24 2000   Goal Rate (mL/hr) 50 mL/hr 11/27/24 2000   Intake (mL) 30 mL 11/26/24 2020   Water Bolus (mL) 250 mL 11/28/24 0600   Rate Formula Tube Feeding (mL/hr) 50 mL/hr 11/27/24 2000   Formula Name isosource 1.5 11/28/24 0400   Intake (mL) - Formula Tube Feeding 200 11/28/24 0400   Residual Amount (ml) 0 ml 11/22/24 1505       Female External Urinary Catheter w/ Suction 11/21/24 1400 (Active)   Skin no redness;no breakdown;perineum cleansed w/ soap and water 11/28/24 0400   Tolerance no signs/symptoms of discomfort 11/28/24 0400   Suction Continuous suction at 40 mmHg 11/27/24 0800   Date of last  "wick change 11/27/24 11/27/24 2000   Time of last wick change 2000 11/27/24 2000   Output (mL) 250 mL 11/26/24 1501          Physical Exam         Neurosurgery Physical Exam    General: well developed, well nourished, no distress.   Head: normocephalic  Neurologic: Alert and oriented. Thought content appropriate.  GCS: Motor: 6/Verbal: 4/Eyes: 4 GCS Total: 14  Mental Status: Awake, Alert, Oriented x 2  Language: aphasia improving  Speech: No dysarthria  Eyes: pupils equal, round, reactive to light with accomodation, EOMI.  Pulmonary: normal respirations, no signs of respiratory distress  Sensory: intact to light touch throughout  Motor Strength: Moves all extremities spontaneously with good tone. Grossly full strength upper and lower extremities. Follows commands x 4 antigravity. No abnormal movements seen.   Skin: Skin is warm, dry and intact.  Incision c/d/I.    Significant Labs:  Recent Labs   Lab 11/28/24  1101 11/29/24  0309   GLU 95  --      --    K 4.5  --    *  --    CO2 21*  --    BUN 18  --    CREATININE 0.7  --    CALCIUM 9.5  --    MG  --  2.4     Recent Labs   Lab 11/28/24  1101 11/29/24  0309   WBC 14.04* 12.61   HGB 9.1* 8.9*   HCT 29.2* 28.4*    254     No results for input(s): "LABPT", "INR", "APTT" in the last 48 hours.  Microbiology Results (last 7 days)       ** No results found for the last 168 hours. **          All pertinent labs from the last 24 hours have been reviewed.    Significant Diagnostics:  CT: No results found in the last 24 hours.  MRI: No results found in the last 24 hours.  Assessment/Plan:     * Cerebral aneurysm  Isabel Coats is a 73-year-old female past medical history of hypertension, obstructive sleep apnea on CPAP, suggestive heart failure, obesity, history of gastric cancer status post chemoradiation and DVT in currently on Eliquis. She presents to [facility] s/p L pterional crani on 11/20 with 7 clips applied in proximity to large irregular L MCA " bifurcation aneurysm and other adjacent small aneurysms, 1 clip applied across neck of aneurysm just proximal to anterior choroidal.  After surgery she seemed to be gradually waking up appropriately overnight, then AM of POD1 around shift change had an unexplained decline in exam during which stat CT showed IPH in the right side of her cerebellum. She became less responsive as well as developed a L gaze preference and dense aphasia. She had a perfusion scan with questionable perfusion deficit to the left anterior temporal lobe and milrinone was started with seemingly some clinical improvement. Now on POD5 has been weaned off milrinone, gaze preference resolved, following commands x4 antigravity, able to state name but still remains aphasic.    Imaging:  CTA Head 11/20: expected post-op changes  CTH 11/21 8 AM: right sided cerebellar IPH  CTA 11/21 10 AM: stable IPH, possible frontal contusion  CTP 11/21 4 PM: possible left anterior temporal perfusion deficit in territory of branches distal to MCA clips   MRI brain w wo 11/22: no strokes, post op changes, stable cerebellar bleed. some edema in right frontal lobe.    Plans:  -TTF 11/26  -q4h neurochecks/vitals  -SBP <160  -drain removed 11/25  -Continue to monitor respiratory status, on room air  -keppra 1g BID postop. cEEG negative during initial AMS POD1  -now has been weaned off milrinone, TCDs 11/28 w/o evidence of vasospasm on left, unable to test right vessels. Continue daily TCDs. Keep euvolemic. Please document I/O every 4 hours.   -PT/OT/OOB  -SLP - tolerating current diet. Continue SLP. Encourage PO fluids since NGT removed yesterday. Calorie count pending. RD following.  -Continue to monitor clinically, notify NSGY immediately with any changes in neuro status   -SQH DVT ppx    Dispo: pending placement    Pure hypercholesterolemia  - home lipitor 20       Chronic diastolic congestive heart failure    Latest ECHO  Results for orders placed during the hospital  encounter of 10/22/24    Echo    Interpretation Summary    Left Ventricle: The left ventricle is normal in size. Normal wall thickness. There is normal systolic function with a visually estimated ejection fraction of 60 - 65%. There is normal diastolic function.    Right Ventricle: Normal right ventricular cavity size. Wall thickness is normal. Systolic function is normal.    Left Atrium: Left atrium is severely dilated.    Right Atrium: Right atrium is dilated.    Aortic Valve: The aortic valve is a trileaflet valve. There is moderate aortic valve sclerosis. Mildly restricted motion.    Tricuspid Valve: There is mild regurgitation.    IVC/SVC: Normal venous pressure at 3 mmHg.    Current Heart Failure Medications  hydrALAZINE injection 10 mg, Every 4 hours PRN, Intravenous  labetalol 20 mg/4 mL (5 mg/mL) IV syring, Every 4 hours PRN, Intravenous  hydrALAZINE tablet 50 mg, Every 8 hours, Oral  lisinopriL tablet 40 mg, Daily, Oral    Plan  - Monitor strict I&Os and daily weights.    - On telemetry  - The patient's volume status is stable        Virgie Peacock PA-C  Neurosurgery  [facility] Glover - Neurosurgery ([facility])

## 2024-11-29 NOTE — PLAN OF CARE
Problem: Adult Inpatient Plan of Care  Goal: Plan of Care Review  Outcome: Progressing  Goal: Readiness for Transition of Care  Outcome: Progressing     Problem: Bariatric Environmental Safety  Goal: Safety Maintained with Care  Outcome: Progressing     Problem: Wound  Goal: Optimal Coping  Outcome: Progressing  Goal: Optimal Functional Ability  Outcome: Progressing  Goal: Absence of Infection Signs and Symptoms  Outcome: Progressing  Goal: Optimal Pain Control and Function  Outcome: Progressing  Goal: Optimal Wound Healing  Outcome: Progressing     Problem: Stroke, Intracerebral Hemorrhage  Goal: Optimal Functional Ability  Outcome: Progressing  Goal: Optimal Nutrition Intake  Outcome: Progressing  Goal: Optimal Pain Control and Function  Outcome: Progressing  Goal: Effective Oxygenation and Ventilation  Outcome: Progressing  Goal: Improved Sensorimotor Function  Outcome: Progressing  Goal: Safe and Effective Swallow  Outcome: Progressing     Problem: Fall Injury Risk  Goal: Absence of Fall and Fall-Related Injury  Outcome: Progressing     Problem: Skin Injury Risk Increased  Goal: Skin Health and Integrity  Outcome: Progressing     General surgery consulted for abscess in r/ thigh. Drained out pus. Plan is to C &D.   Strict IO needed.Total out put per shift 900      . Continue tele, normo sinus. VSS/ .BM X 0.   PRN pain meds x 1, last dose @ 1444 fr back pain and a pain in thigh abscess ,  no critical value labs per shift. Hb 8.9.  poc reviewed w/ pt. Pt needs assistance  in feeding/ rolling on bed x 2 assist. Takes pills whole/ tolerate thin liquid . Continue pureed d/ regular diet.feeds dc.

## 2024-11-29 NOTE — SUBJECTIVE & OBJECTIVE
Interval History: NAEON. AFVSS. TCDs today pending. NGT removed overnight by patient. +245 I/O during day shift yesterday, undocumented overnight. Encourage PO fluids since NGT out. Tolerating current recommended diet per SLP. Exam stable. Pending placement.    Medications:  Continuous Infusions:  Scheduled Meds:   atorvastatin  20 mg Oral Daily    citalopram  10 mg Oral Daily    gabapentin  600 mg Oral Q8H    heparin (porcine)  7,500 Units Subcutaneous Q8H    hydrALAZINE  50 mg Oral Q8H    levetiracetam  1,000 mg Oral BID    lisinopriL  40 mg Oral Daily     PRN Meds:  Current Facility-Administered Medications:     acetaminophen, 650 mg, Oral, Q4H PRN    bisacodyL, 10 mg, Rectal, Daily PRN    hydrALAZINE, 10 mg, Intravenous, Q4H PRN    labetalol, 10 mg, Intravenous, Q4H PRN    morphine, 2 mg, Intravenous, Q6H PRN    naloxone, 0.4 mg, Intravenous, PRN    ondansetron, 4 mg, Intravenous, Q12H PRN    oxyCODONE, 5 mg, Oral, Q6H PRN    sodium chloride 0.9%, 10 mL, Intravenous, PRN     Review of Systems  Objective:     Weight: 123.4 kg (272 lb 0.8 oz)  Body mass index is 43.91 kg/m².  Vital Signs (Most Recent):  Temp: 98.2 °F (36.8 °C) (11/29/24 0810)  Pulse: 77 (11/29/24 0810)  Resp: 16 (11/29/24 0810)  BP: (!) 145/66 (11/29/24 0810)  SpO2: 95 % (11/29/24 0810) Vital Signs (24h Range):  Temp:  [98 °F (36.7 °C)-99.2 °F (37.3 °C)] 98.2 °F (36.8 °C)  Pulse:  [66-77] 77  Resp:  [16-18] 16  SpO2:  [93 %-95 %] 95 %  BP: (128-153)/(64-74) 145/66                              NG/OG Tube 11/21/24 1800 Right nostril (Active)   Placement Check placement verified by x-ray 11/28/24 0400   Tolerance no signs/symptoms of discomfort 11/28/24 0400   Securement secured to commercial device;secured to nostril center 11/28/24 0400   Clamp Status/Tolerance unclamped 11/28/24 0400   Suction Setting/Drainage Method suction at the bedside 11/26/24 1600   Insertion Site Appearance no redness, warmth, tenderness, skin breakdown, drainage 11/28/24  0400   Drainage None 11/28/24 0400   Flush/Irrigation flushed w/;water 11/28/24 0400   Feeding Type continuous 11/28/24 0400   Feeding Action feeding continued 11/28/24 0400   Current Rate (mL/hr) 50 mL/hr 11/27/24 2000   Goal Rate (mL/hr) 50 mL/hr 11/27/24 2000   Intake (mL) 30 mL 11/26/24 2020   Water Bolus (mL) 250 mL 11/28/24 0600   Rate Formula Tube Feeding (mL/hr) 50 mL/hr 11/27/24 2000   Formula Name isosource 1.5 11/28/24 0400   Intake (mL) - Formula Tube Feeding 200 11/28/24 0400   Residual Amount (ml) 0 ml 11/22/24 1505       Female External Urinary Catheter w/ Suction 11/21/24 1400 (Active)   Skin no redness;no breakdown;perineum cleansed w/ soap and water 11/28/24 0400   Tolerance no signs/symptoms of discomfort 11/28/24 0400   Suction Continuous suction at 40 mmHg 11/27/24 0800   Date of last wick change 11/27/24 11/27/24 2000   Time of last wick change 2000 11/27/24 2000   Output (mL) 250 mL 11/26/24 1501          Physical Exam         Neurosurgery Physical Exam    General: well developed, well nourished, no distress.   Head: normocephalic  Neurologic: Alert and oriented. Thought content appropriate.  GCS: Motor: 6/Verbal: 4/Eyes: 4 GCS Total: 14  Mental Status: Awake, Alert, Oriented x 2  Language: aphasia improving  Speech: No dysarthria  Eyes: pupils equal, round, reactive to light with accomodation, EOMI.  Pulmonary: normal respirations, no signs of respiratory distress  Sensory: intact to light touch throughout  Motor Strength: Moves all extremities spontaneously with good tone. Grossly full strength upper and lower extremities. Follows commands x 4 antigravity. No abnormal movements seen.   Skin: Skin is warm, dry and intact.  Incision c/d/I.    Significant Labs:  Recent Labs   Lab 11/28/24  1101 11/29/24  0309   GLU 95  --      --    K 4.5  --    *  --    CO2 21*  --    BUN 18  --    CREATININE 0.7  --    CALCIUM 9.5  --    MG  --  2.4     Recent Labs   Lab 11/28/24  1106  "11/29/24  0309   WBC 14.04* 12.61   HGB 9.1* 8.9*   HCT 29.2* 28.4*    254     No results for input(s): "LABPT", "INR", "APTT" in the last 48 hours.  Microbiology Results (last 7 days)       ** No results found for the last 168 hours. **          All pertinent labs from the last 24 hours have been reviewed.    Significant Diagnostics:  CT: No results found in the last 24 hours.  MRI: No results found in the last 24 hours.  "

## 2024-11-29 NOTE — TELEPHONE ENCOUNTER
I attempt to reach pt  to remind her of her upcoming apt,but unfortunately I was  unable to reach patient   so i left both a voice mail and call back number.

## 2024-11-29 NOTE — ASSESSMENT & PLAN NOTE
Latest ECHO  Results for orders placed during the hospital encounter of 10/22/24    Echo    Interpretation Summary    Left Ventricle: The left ventricle is normal in size. Normal wall thickness. There is normal systolic function with a visually estimated ejection fraction of 60 - 65%. There is normal diastolic function.    Right Ventricle: Normal right ventricular cavity size. Wall thickness is normal. Systolic function is normal.    Left Atrium: Left atrium is severely dilated.    Right Atrium: Right atrium is dilated.    Aortic Valve: The aortic valve is a trileaflet valve. There is moderate aortic valve sclerosis. Mildly restricted motion.    Tricuspid Valve: There is mild regurgitation.    IVC/SVC: Normal venous pressure at 3 mmHg.    Current Heart Failure Medications  hydrALAZINE injection 10 mg, Every 4 hours PRN, Intravenous  labetalol 20 mg/4 mL (5 mg/mL) IV syring, Every 4 hours PRN, Intravenous  hydrALAZINE tablet 50 mg, Every 8 hours, Oral  lisinopriL tablet 40 mg, Daily, Oral    Plan  - Monitor strict I&Os and daily weights.    - On telemetry  - The patient's volume status is stable

## 2024-11-29 NOTE — HPI
74 yo F here after a craniotomy with aneurysm clipping. She has developed a R inner thigh abscess during her hospital stay. We were consulted for evaluation.    The patient is able to tell me very little about this abscess. She is unsure how long it has been there or when it started draining.

## 2024-11-29 NOTE — PROGRESS NOTES
Calorie Count Results         11/27- 446 kcals  Breakfast: -  Lunch: Chicken heather 75%, green beans 25%, mashed potatoes 100%  Dinner: Roast beef, bow tie pasta, broccoli 25%    11/28 - 313 kcals  Breakfast: Eggs 25%, sausage 25%, cream of wheat 0%  Lunch: Roast pork 25%, broccoli 0%, mashed potatoes 25%  Dinner: Milk 25%, chicken heather 75%, carrots 25%, mashed potatoes 25%     11/29 - 486.5 kcals  Breakfast: Milk 25%, grits 50%, vinson 100%, eggs 50%  Lunch:  -  Dinner:  -     Average PO intake of 415 kcals per meal x 6 noted meals.

## 2024-11-29 NOTE — ASSESSMENT & PLAN NOTE
72 yo F here after a craniotomy with aneurysm clipping. She has developed a R inner thigh abscess during her hospital stay. This is spontaneously and adequately draining. No significant fluctuance or induration.    -No surgical intervention indicated or recommended at this time as this area is draining well

## 2024-11-29 NOTE — PLAN OF CARE
[facility] - Neurosurgery (Hospital)  Discharge Reassessment    Primary Care Provider: Lei Garcia MD    Expected Discharge Date: 12/3/2024    Reassessment (most recent)       Discharge Reassessment - 11/29/24 1242          Discharge Reassessment    Assessment Type Discharge Planning Reassessment (P)      Did the patient's condition or plan change since previous assessment? No (P)      Discharge Plan discussed with: Adult children (P)      Communicated ZAIDA with patient/caregiver Yes (P)      Discharge Plan A Skilled Nursing Facility (P)      DME Needed Upon Discharge  none (P)      Transition of Care Barriers None (P)         Post-Acute Status    Post-Acute Authorization Placement (P)      Post-Acute Placement Status Referrals Sent (P)                    Patient not medically cleared.  Removed NG and currently on a calorie count with Puree diet.  SNF referrals sent out several days ago and [facility] is choice.  They are reviewing.      Discharge Plan A and Plan B have been determined by review of patient's clinical status, future medical and therapeutic needs, and coverage/benefits for post-acute care in coordination with multidisciplinary team members.    Trixie Casper, JENNIFER  [facility]  [phone]

## 2024-11-29 NOTE — CONSULTS
[facility] ([facility])  General Surgery  Consult Note    Patient Name: Ca Coats  MRN: [id number]  Code Status: Full Code  Admission Date: 11/20/2024  Hospital Length of Stay: 9 days  Attending Physician: Sejal Simon MD  Primary Care Provider: Lei Garcia MD    Patient information was obtained from patient and past medical records.     Inpatient consult to General Surgery  Consult performed by: Mono Raines MD  Consult ordered by: Virgie Peacock PA-C  Reason for consult: R thigh abscess      Subjective:     Principal Problem: Cerebral aneurysm    History of Present Illness: 74 yo F here after a craniotomy with aneurysm clipping. She has developed a R inner thigh abscess during her hospital stay. We were consulted for evaluation.    The patient is able to tell me very little about this abscess. She is unsure how long it has been there or when it started draining.     Current Facility-Administered Medications on File Prior to Encounter   Medication    0.9%  NaCl infusion    0.9%  NaCl infusion    sodium chloride 0.9% flush 10 mL     Current Outpatient Medications on File Prior to Encounter   Medication Sig    acetaminophen (TYLENOL) 500 MG tablet Take by mouth daily as needed.    atorvastatin (LIPITOR) 20 MG tablet Take 1 tablet (20 mg total) by mouth once daily.    B-complex with vitamin C (Z-BEC OR EQUIV) tablet Take 1 tablet by mouth once daily.     citalopram (CELEXA) 10 MG tablet Take 1 tablet (10 mg total) by mouth once daily.    furosemide (LASIX) 80 MG tablet Take 1 tablet (80 mg total) by mouth 2 (two) times daily.    gabapentin (NEURONTIN) 300 MG capsule Take 2 capsules (600 mg total) by mouth 3 (three) times daily.    lisinopriL (PRINIVIL,ZESTRIL) 40 MG tablet Take 1 tablet (40 mg total) by mouth once daily.    multivitamin with minerals tablet Take 1 tablet by mouth once daily.     pantoprazole (PROTONIX) 40 MG tablet Take 1 tablet (40 mg total) by  mouth once daily.    semaglutide (OZEMPIC) 1 mg/dose (4 mg/3 mL) Inject 1 mg into the skin every 7 days.    CALCIUM CITRATE-VITAMIN D2 ORAL Take 1 tablet by mouth once daily.        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    GERD (gastroesophageal reflux disease)     Glaucoma suspect     History of gastric ulcer     History of psychiatric hospitalization     Hx of psychiatric care     Celexa, no recall of charted Effexor, Lexapro, Desyrel prescriptions    Hyperlipidemia     Hypertension     Hypothyroidism     Morbid obesity     Neuromuscular disorder     Sleep apnea     Thyroid disease      Past Surgical History:   Procedure Laterality Date    APPENDECTOMY      CATARACT EXTRACTION W/  INTRAOCULAR LENS IMPLANT Bilateral     MFIOL OU    CLIP LIGATION OF INTRACRANIAL ANEURYSM BY CRANIOTOMY Left 11/20/2024    Procedure: CRANIOTOMY, WITH ANEURYSM CLIPPING W/ STEALTH;  Surgeon: Sejal Simon MD;  Location: [facility] OR [address] [place] [address];  Service: Neurosurgery;  Laterality: Left;  left pterioral craniotomy with microsurgical clip ligation of mca and anterior chroidal aneurysm, dr. ginna zhou surgeon, scalp block, type and cross 2 units, neuromonitoring sep,mep,eeg, regular bed, desouza, supine spencer, icu pos    CORONARY ANGIOGRAPHY Bilateral 02/24/2021    Procedure: ANGIOGRAM, CORONARY ARTERY;  Surgeon: Cresencio Ridley MD;  Location: [facility] CATH  LAB;  Service: Cardiology;  Laterality: Bilateral;  Covid test needed - ok per Danay SSCU. Pt. w/o transportation or family    CRANIOTOMY, FOR ANEURYSM OF VERTEBROBASILAR OR CAROTID CIRCULATION Left 11/20/2024    Procedure: CRANIOTOMY, FOR ANEURYSM OF VERTEBROBASILAR OR CAROTID CIRCULATION;  Surgeon: Sejal Simon MD;  Location: [facility] OR 2ND FLR;  Service: Neurosurgery;  Laterality: Left;  with scalp block    DISSECTION, NERVE, USING OPERATING MICROSCOPE Left 11/20/2024    Procedure: DISSECTION, NERVE, USING OPERATING MICROSCOPE;  Surgeon: Sejal Simon MD;  Location: [facility] OR 2ND FLR;  Service: Neurosurgery;  Laterality: Left;  with scalp block    ENDOSCOPIC ULTRASOUND OF UPPER GASTROINTESTINAL TRACT N/A 03/17/2021    Procedure: ULTRASOUND, UPPER GI TRACT, ENDOSCOPIC;  Surgeon: Gerald Anaya MD;  Location: [facility] ENDO (2ND FLR);  Service: Endoscopy;  Laterality: N/A;  Pt unable to get a ride for swab. Will do rapid test at 8:30 - ttr    ENDOSCOPIC ULTRASOUND OF UPPER GASTROINTESTINAL TRACT N/A 01/13/2022    Procedure: ULTRASOUND, UPPER GI TRACT, ENDOSCOPIC;  Surgeon: Kevin Carballo MD;  Location: [facility] ENDO (2ND FLR);  Service: Endoscopy;  Laterality: N/A;  blood thinner approval received, see telephone encounter 1/7/22-BB  rapid  instructions given verbally and sent to address on file in pt's chart-BB    ENDOSCOPIC ULTRASOUND OF UPPER GASTROINTESTINAL TRACT N/A 10/11/2022    Procedure: ULTRASOUND, UPPER GI TRACT, ENDOSCOPIC;  Surgeon: Dequan Ridley MD;  Location: [facility] ENDO (2ND FLR);  Service: Endoscopy;  Laterality: N/A;    ENDOSCOPIC ULTRASOUND OF UPPER GASTROINTESTINAL TRACT N/A 01/24/2024    Procedure: ULTRASOUND, UPPER GI TRACT, ENDOSCOPIC;  Surgeon: Kevin Carballo MD;  Location: [facility] ENDO (2ND FLR);  Service: Endoscopy;  Laterality: N/A;    ENDOSCOPIC ULTRASOUND OF UPPER GASTROINTESTINAL TRACT N/A 03/05/2024    Procedure: ULTRASOUND, UPPER GI TRACT, ENDOSCOPIC;  Surgeon: Haris  Kevin SINGH MD;  Location: [facility] SAM (2ND FLR);  Service: Endoscopy;  Laterality: N/A;  1/25 portal instr-Repeat theEUS at the next available appointment because the preparation was poor. 48hrs of liquid. Ozempic 7 day hold-eliquis approved Dr. Pelaez TE 12/12/2023-tt  2/28-precall complete-pt verbalized udnerstanding of holding Oz    ESOPHAGOGASTRODUODENOSCOPY N/A 02/05/2021    Procedure: EGD (ESOPHAGOGASTRODUODENOSCOPY);  Surgeon: Matthew Hernadez MD;  Location: [facility] ENDO (2ND FLR);  Service: Endoscopy;  Laterality: N/A;  BMI-58    Wt: 361#     COVID test at [facility] on 2/2-GT    ESOPHAGOGASTRODUODENOSCOPY N/A 03/17/2021    Procedure: EGD (ESOPHAGOGASTRODUODENOSCOPY);  Surgeon: Gerald Anaya MD;  Location: [facility] ENDO (2ND FLR);  Service: Endoscopy;  Laterality: N/A;    ESOPHAGOGASTRODUODENOSCOPY N/A 05/25/2021    Procedure: EGD (ESOPHAGOGASTRODUODENOSCOPY);  Surgeon: Kevin Carballo MD;  Location: [facility] SAM (2ND FLR);  Service: Endoscopy;  Laterality: N/A;  ESD 2 hours  Full COVID vaccination on file. ttr    ESOPHAGOGASTRODUODENOSCOPY N/A 01/13/2022    Procedure: EGD (ESOPHAGOGASTRODUODENOSCOPY);  Surgeon: Kevin Carballo MD;  Location: [facility] ENDO (2ND FLR);  Service: Endoscopy;  Laterality: N/A;  blood thinner approval, see telephone encounter 1/7/22-BB  rapid  instructions given verbally and sent to address on file in pt's chart-BB    ESOPHAGOGASTRODUODENOSCOPY N/A 10/11/2022    Procedure: EGD (ESOPHAGOGASTRODUODENOSCOPY);  Surgeon: Dequan Ridley MD;  Location: [facility] ENDO (2ND FLR);  Service: Endoscopy;  Laterality: N/A;  She is do for EGD/EUS now. Personal history of gastric cancer. Ca Coats #[id number].   Thanks,   Kevin Fuentes MD    Pt is fully vaccinated-DS  9/14/22-Approval to hold Eliquis rec'd from Dr. Pelaez (see telephone encounter 9/14/22)-DS  9/14/22-Ins    ESOPHAGOGASTRODUODENOSCOPY N/A 01/24/2024    Procedure: EGD (ESOPHAGOGASTRODUODENOSCOPY);  Surgeon: Kevin Carballo MD;   Location: [facility] ENDO (2ND FLR);  Service: Endoscopy;  Laterality: N/A;  12/8/23: instructions sent via portal. eliquis approval from MD Pelaez in telephone encounter (12/12/23) -GD  1/9-precall complete-MS    ESOPHAGOGASTRODUODENOSCOPY N/A 03/05/2024    Procedure: EGD (ESOPHAGOGASTRODUODENOSCOPY);  Surgeon: Kevin Carballo MD;  Location: [facility] ENDO (2ND FLR);  Service: Endoscopy;  Laterality: N/A;    EYE SURGERY      HYSTEROSCOPIC POLYPECTOMY OF UTERUS  01/10/2024    Procedure: POLYPECTOMY, UTERUS, HYSTEROSCOPIC;  Surgeon: Pina Pickens MD;  Location: [facility] OR;  Service: OB/GYN;;    HYSTEROSCOPY WITH DILATION AND CURETTAGE OF UTERUS N/A 01/10/2024    Procedure: HYSTEROSCOPY, WITH DILATION AND CURETTAGE OF UTERUS;  Surgeon: Pina Pickens MD;  Location: [facility] OR;  Service: OB/GYN;  Laterality: N/A;    INJECTION OF ANESTHETIC AGENT AROUND NERVE Left 07/10/2018    Procedure: BLOCK, NERVE;  Surgeon: Samantha Vidal MD;  Location: [facility] PAIN MGT;  Service: Pain Management;  Laterality: Left;  Genicular     INJECTION OF ANESTHETIC AGENT AROUND NERVE Left 07/19/2019    Procedure: Block, Nerve NERVE BLOCK GENICULAR WITH PHENOL 6%;  Surgeon: Samantha Vidal MD;  Location: [facility] PAIN MGT;  Service: Pain Management;  Laterality: Left;  NEEDS CONSENT    INSERTION OF TUNNELED CENTRAL VENOUS CATHETER (CVC) WITH SUBCUTANEOUS PORT N/A 07/09/2021    Procedure: INSERTION, PORT-A-CATH;  Surgeon: Mario Paz MD;  Location: [facility] CATH LAB;  Service: Radiology;  Laterality: N/A;  PORT PLACEMENT    RADIOFREQUENCY ABLATION Left 06/19/2020    Procedure: RADIOFREQUENCY ABLATION LEFT GENICULAR;  Surgeon: Tevin Clark MD;  Location: [facility] PAIN MGT;  Service: Pain Management;  Laterality: Left;  Left Genicular RFA    RADIOFREQUENCY ABLATION Left 01/22/2021    Procedure: RADIOFREQUENCY ABLATION LEFT GENICULAR;  Surgeon: Tevin Clark MD;  Location: [facility] PAIN MGT;  Service: Pain Management;  Laterality: Left;     RADIOFREQUENCY ABLATION Left 2021    Procedure: RADIOFREQUENCY ABLATION, GENICULAR COOLED NEED CONSENT;  Surgeon: Tevin Clark MD;  Location: [facility] [place] PAIN MGT;  Service: Pain Management;  Laterality: Left;    RADIOFREQUENCY ABLATION Left 2022    Procedure: RADIOFREQUENCY ABLATION, GENICULAR COOLED , LEFT;  Surgeon: Tevin Clark MD;  Location: [facility] [place] PAIN MGT;  Service: Pain Management;  Laterality: Left;    RADIOFREQUENCY ABLATION Left 2022    Procedure: RADIOFREQUENCY ABLATION LEFT GENICULAR COOLED CLEARED TO HOLD ELIQUIS 3 DAYS  NEEDS CONSENT;  Surgeon: Tevin Clark MD;  Location: [facility] [place] PAIN MGT;  Service: Pain Management;  Laterality: Left;    TONSILLECTOMY       Family History       Problem Relation (Age of Onset)    No Known Problems Mother, Father          Tobacco Use    Smoking status: Some Days     Current packs/day: 0.00     Average packs/day: 1 pack/day for 53.9 years (53.7 ttl pk-yrs)     Types: Cigarettes     Start date:      Last attempt to quit: 2023     Years since quittin.9    Smokeless tobacco: Never    Tobacco comments:     currently smoking <5 cigarettes most days   Substance and Sexual Activity    Alcohol use: Yes     Alcohol/week: 1.0 standard drink of alcohol     Types: 1 Glasses of wine per week     Comment: rarely    Drug use: No    Sexual activity: Not Currently     Partners: Male     Review of Systems   Unable to perform ROS: Mental status change     Objective:     Vital Signs (Most Recent):  Temp: 99.4 °F (37.4 °C) (24 1116)  Pulse: 69 (24 1447)  Resp: 18 (24 1444)  BP: (!) 140/64 (24 1116)  SpO2: (!) 94 % (24 1116) Vital Signs (24h Range):  Temp:  [98 °F (36.7 °C)-99.4 °F (37.4 °C)] 99.4 °F (37.4 °C)  Pulse:  [65-87] 69  Resp:  [16-20] 18  SpO2:  [93 %-95 %] 94 %  BP: (128-153)/(64-74) 140/64     Weight: 123.4 kg (272 lb 0.8 oz)  Body mass index is 43.91 kg/m².     Physical Exam  NAD. Nontoxic  On the R inner  thigh, there is an area with three opening spontaneously draining seropurulent drainage. There is no surrounding erythema, warmth, induration, or fluctuance        I have reviewed all pertinent lab results within the past 24 hours.  CBC:   Recent Labs   Lab 11/29/24  0309   WBC 12.61   RBC 2.72*   HGB 8.9*   HCT 28.4*      *   MCH 32.7*   MCHC 31.3*       Significant Diagnostics:  I have reviewed all pertinent imaging results/findings within the past 24 hours.    Assessment/Plan:     Abscess  74 yo F here after a craniotomy with aneurysm clipping. She has developed a R inner thigh abscess during her hospital stay. This is spontaneously and adequately draining. No significant fluctuance or induration.    -No surgical intervention indicated or recommended at this time as this area is draining well      VTE Risk Mitigation (From admission, onward)           Ordered     heparin (porcine) injection 7,500 Units  Every 8 hours         11/25/24 0920     IP VTE HIGH RISK PATIENT  Once         11/20/24 2045     Place sequential compression device  Until discontinued         11/20/24 2045                    Thank you for your consult. I will sign off. Please contact us if you have any additional questions.    Mono Raines MD  General Surgery  [facility] - Neurosurgery ([facility])

## 2024-11-29 NOTE — PT/OT/SLP PROGRESS
"Speech Language Pathology Treatment    Patient Name:  Ca Coats   MRN:  [id number]  Admitting Diagnosis: Cerebral aneurysm    Recommendations:            General Recommendations:  Dysphagia therapy, Speech/language therapy, and Cognitive-linguistic therapy  Diet recommendations:  Puree Diet - IDDSI Level 4, Liquid Diet Level: Thin liquids - IDDSI Level 0   Aspiration Precautions: 1 bite/sip at a time, Assistance with meals, Frequent oral care, HOB to 90 degrees, Monitor for s/s of aspiration, Small bites/sips, and Standard aspiration precautions   General Precautions: Standard, aspiration, pureed diet  Communication strategies:  go to room if call light pushed      Assessment:   Ca Coats is a 73 y.o. female with an SLP diagnosis of Aphasia, Dysphagia, and Cognitive-Linguistic Impairment.  She presents with increased level of alertness.    Subjective   Pt resting in bed upon SLP arrival. She presents with increased alertness, but increasing fatigue throughout session. RN endorsing pt tolerating diet well.    Pain/Comfort:  Pain Rating 1: 0/10    Respiratory Status: Room air    Objective:     Has the patient been evaluated by SLP for swallowing?   Yes  Keep patient NPO? No     Pt continues to maintain awake/alertness t/o session this date. Pt oriented to month, year, and place (hospital), requiring max cues for name of hospital. Pt not oriented to situation. Vocal intensity decreased, requiring frequent cues to speak loudly.   Pt accepted straw sips of thin liquids with one instance of anterior loss. Pt endorsing she "didn't think about it" when asked why she did not swallow".  Pt accepted puree consistency x3 and minced and moist consistency x1 with no overt signs of aspiration. Pt with mild oral residue following minced and moist trial, requiring a liquid wash to clear.  Pt unable to read clock to determine time this date. Pt completed phrase completion task with 43% accy/86% accy given " max cues and repetitions. Pt completed responsive naming task with 33% accy, unable to increase despite cueing. Pt requesting discontinuation of tasks 2/2 fatigue.  Recommend continued Puree/thins diet at this time.    Goals:   Multidisciplinary Problems       SLP Goals          Problem: SLP    Goal Priority Disciplines Outcome   SLP Goal     SLP Progressing   Description: Speech Language Pathology Goals  Goals expected to be met by 12/5:  1) Pt will participate in ongoing assessment of swallow function to determine least restrictive diet.  2) Pt will participate in ongoing speech, language, cognitive evaluation to determine possible goals and poc.   3) Pt will respond to simple yes/no questions with 80% accy given min cues.  4) Pt will complete automatic speech tasks with 90% accy given min cues.  5) Pt will complete naming tasks with 80% accy given mod cues.  6) Pt will follow simple commands with 90% accy given mod cues.                                 Plan:     Patient to be seen:  4 x/week   Plan of Care expires:  12/22/24  Plan of Care reviewed with:  patient   SLP Follow-Up:  Yes       Discharge recommendations:  High Intensity Therapy       Time Tracking:     SLP Treatment Date:   11/29/24  Speech Start Time:  1507  Speech Stop Time:  1528     Speech Total Time (min):  21 min    Billable Minutes: Speech Therapy Individual 10 and Treatment Swallowing Dysfunction 11    11/29/2024

## 2024-11-29 NOTE — CONSULTS
Inpatient consult to Physical Medicine Rehab  Consult performed by: Marti Madrigal NP  Consult ordered by: Indu Schultz MD      Consult received.      Marti Madrigal NP  Physical Medicine & Rehabilitation   11/29/2024

## 2024-11-29 NOTE — PROGRESS NOTES
Dario Glover - Neurosurgery ([facility])  Wound Care    Patient Name:  Ca Coats   MRN:  [id number]  Date: 2024  Diagnosis: Cerebral aneurysm    History:     Past Medical History:   Diagnosis Date    YOUNG (acute kidney injury) 10/15/2021    Anemia     2018    Arthritis     BMI 60.0-69.9, adult     Cataract     Chronic anticoagulation 2024    DVT in currently on Eliquis     Chronic diastolic congestive heart failure 2021    Coronary artery disease involving native coronary artery of native heart without angina pectoris 11/15/2024    Deep vein thrombosis     Depression     Dry eye syndrome     DVT of deep femoral vein, right 2023    Gastric adenocarcinoma 2021    GERD (gastroesophageal reflux disease)     Glaucoma suspect     History of gastric ulcer     History of psychiatric hospitalization     Hx of psychiatric care     Celexa, no recall of charted Effexor, Lexapro, Desyrel prescriptions    Hyperlipidemia     Hypertension     Hypothyroidism     Morbid obesity     Neuromuscular disorder     Sleep apnea     Thyroid disease        Social History     Socioeconomic History    Marital status: Single    Number of children: 2   Occupational History     Comment: retired  and cook   Tobacco Use    Smoking status: Some Days     Current packs/day: 0.00     Average packs/day: 1 pack/day for 53.9 years (53.7 ttl pk-yrs)     Types: Cigarettes     Start date:      Last attempt to quit: 2023     Years since quittin.9    Smokeless tobacco: Never    Tobacco comments:     currently smoking <5 cigarettes most days   Substance and Sexual Activity    Alcohol use: Yes     Alcohol/week: 1.0 standard drink of alcohol     Types: 1 Glasses of wine per week     Comment: rarely    Drug use: No    Sexual activity: Not Currently     Partners: Male   Social History Narrative    2 children (dtr in area, son in [place]) and she has 3 grandkids (in [place]),    lives alone. Prev   and julianna    Originally from [facility]     Social Drivers of Health     Financial Resource Strain: Medium Risk (11/21/2024)    Overall Financial Resource Strain (CARDIA)     Difficulty of Paying Living Expenses: Somewhat hard   Food Insecurity: No Food Insecurity (11/21/2024)    Hunger Vital Sign     Worried About Running Out of Food in the Last Year: Never true     Ran Out of Food in the Last Year: Never true   Recent Concern: Food Insecurity - Food Insecurity Present (10/11/2024)    Hunger Vital Sign     Worried About Running Out of Food in the Last Year: Sometimes true     Ran Out of Food in the Last Year: Never true   Transportation Needs: No Transportation Needs (11/21/2024)    TRANSPORTATION NEEDS     Transportation : No   Physical Activity: Unknown (10/11/2024)    Exercise Vital Sign     Days of Exercise per Week: 5 days   Recent Concern: Physical Activity - Inactive (10/11/2024)    Exercise Vital Sign     Days of Exercise per Week: 5 days     Minutes of Exercise per Session: 0 min   Stress: Patient Unable To Answer (11/21/2024)    [employer] - Occupational Stress Questionnaire     Feeling of Stress : Patient unable to answer   Recent Concern: Stress - Stress Concern Present (10/11/2024)    [employer] - Occupational Stress Questionnaire     Feeling of Stress : Very much   Housing Stability: Low Risk  (11/21/2024)    Housing Stability Vital Sign     Unable to Pay for Housing in the Last Year: No     Homeless in the Last Year: No       Precautions:     Allergies as of 10/08/2024    (No Known Allergies)       WO Assessment Details/Treatment     Pt seen for wound care follow-up for R groin.  Chart reviewed for this encounter.  See flowsheets for findings.    Pt found lying in bed, agreeable to care at this time. On assessment, BL inner thighs are moist. X1 small, resolving partial thickness wound to R inner thigh, Triad applied for moisture barrier  protection. Purewick in use, continue Triad BID/PRN.     11/29/24 1002   WOCN Assessment   WOCN Total Time (mins) 15   Visit Date 11/29/24   Visit Time 1002   Consult Type Follow Up   WOCN Speciality Wound   Wound moisture   Intervention assessed;changed;applied;chart review;coordination of care   Teaching on-going        Wound 11/23/24 Skin Tear Right Groin   Date First Assessed: 11/23/24   Primary Wound Type: Skin Tear  Side: Right  Location: Groin   Dressing Appearance Open to air   Drainage Amount None   Drainage Characteristics/Odor No odor   Appearance Pink;Dry   Tissue loss description Partial thickness   Periwound Area Intact;Moist   Wound Edges Open   Care Applied:;Skin Barrier

## 2024-11-29 NOTE — ASSESSMENT & PLAN NOTE
Isabel Coats is a 73-year-old female past medical history of hypertension, obstructive sleep apnea on CPAP, suggestive heart failure, obesity, history of gastric cancer status post chemoradiation and DVT in currently on Eliquis. She presents to [facility] s/p L pterional crani on 11/20 with 7 clips applied in proximity to large irregular L MCA bifurcation aneurysm and other adjacent small aneurysms, 1 clip applied across neck of aneurysm just proximal to anterior choroidal.  After surgery she seemed to be gradually waking up appropriately overnight, then AM of POD1 around shift change had an unexplained decline in exam during which stat CT showed IPH in the right side of her cerebellum. She became less responsive as well as developed a L gaze preference and dense aphasia. She had a perfusion scan with questionable perfusion deficit to the left anterior temporal lobe and milrinone was started with seemingly some clinical improvement. Now on POD5 has been weaned off milrinone, gaze preference resolved, following commands x4 antigravity, able to state name but still remains aphasic.    Imaging:  CTA Head 11/20: expected post-op changes  CTH 11/21 8 AM: right sided cerebellar IPH  CTA 11/21 10 AM: stable IPH, possible frontal contusion  CTP 11/21 4 PM: possible left anterior temporal perfusion deficit in territory of branches distal to MCA clips   MRI brain w wo 11/22: no strokes, post op changes, stable cerebellar bleed. some edema in right frontal lobe.    Plans:  -TTF 11/26  -q4h neurochecks/vitals  -SBP <160  -drain removed 11/25  -Continue to monitor respiratory status, on room air  -keppra 1g BID postop. cEEG negative during initial AMS POD1  -now has been weaned off milrinone, TCDs 11/28 w/o evidence of vasospasm on left, unable to test right vessels. Continue daily TCDs. Keep euvolemic. Please document I/O every 4 hours.   -PT/OT/OOB  -SLP - tolerating current diet. Continue SLP. Encourage PO fluids since NGT  removed yesterday. Calorie count pending. RD following.  -Continue to monitor clinically, notify NSGY immediately with any changes in neuro status   -SQH DVT ppx    Dispo: pending placement

## 2024-11-29 NOTE — SUBJECTIVE & OBJECTIVE
Current Facility-Administered Medications on File Prior to Encounter   Medication    0.9%  NaCl infusion    0.9%  NaCl infusion    sodium chloride 0.9% flush 10 mL     Current Outpatient Medications on File Prior to Encounter   Medication Sig    acetaminophen (TYLENOL) 500 MG tablet Take by mouth daily as needed.    atorvastatin (LIPITOR) 20 MG tablet Take 1 tablet (20 mg total) by mouth once daily.    B-complex with vitamin C (Z-BEC OR EQUIV) tablet Take 1 tablet by mouth once daily.     citalopram (CELEXA) 10 MG tablet Take 1 tablet (10 mg total) by mouth once daily.    furosemide (LASIX) 80 MG tablet Take 1 tablet (80 mg total) by mouth 2 (two) times daily.    gabapentin (NEURONTIN) 300 MG capsule Take 2 capsules (600 mg total) by mouth 3 (three) times daily.    lisinopriL (PRINIVIL,ZESTRIL) 40 MG tablet Take 1 tablet (40 mg total) by mouth once daily.    multivitamin with minerals tablet Take 1 tablet by mouth once daily.     pantoprazole (PROTONIX) 40 MG tablet Take 1 tablet (40 mg total) by mouth once daily.    semaglutide (OZEMPIC) 1 mg/dose (4 mg/3 mL) Inject 1 mg into the skin every 7 days.    CALCIUM CITRATE-VITAMIN D2 ORAL Take 1 tablet by mouth once daily.        Review of patient's allergies indicates:  No Known Allergies    Past Medical History:   Diagnosis Date    YOUNG (acute kidney injury) 10/15/2021    Anemia     2018    Arthritis     BMI 60.0-69.9, adult     Cataract     Chronic anticoagulation 11/27/2024    DVT in currently on Eliquis     Chronic diastolic congestive heart failure 01/27/2021    Coronary artery disease involving native coronary artery of native heart without angina pectoris 11/15/2024    Deep vein thrombosis     Depression     Dry eye syndrome     DVT of deep femoral vein, right 05/29/2023    Gastric adenocarcinoma 05/25/2021    GERD (gastroesophageal reflux disease)     Glaucoma suspect     History of gastric ulcer     History of psychiatric hospitalization     Hx of psychiatric  care     Celexa, no recall of charted Effexor, Lexapro, Desyrel prescriptions    Hyperlipidemia     Hypertension     Hypothyroidism     Morbid obesity     Neuromuscular disorder     Sleep apnea     Thyroid disease      Past Surgical History:   Procedure Laterality Date    APPENDECTOMY      CATARACT EXTRACTION W/  INTRAOCULAR LENS IMPLANT Bilateral     MFIOL OU    CLIP LIGATION OF INTRACRANIAL ANEURYSM BY CRANIOTOMY Left 11/20/2024    Procedure: CRANIOTOMY, WITH ANEURYSM CLIPPING W/ STEALTH;  Surgeon: Sejal Simon MD;  Location: [facility] OR [address] [place] [address];  Service: Neurosurgery;  Laterality: Left;  left pterioral craniotomy with microsurgical clip ligation of mca and anterior chroidal aneurysm, dr. ginna hopkins co surgeon, scalp block, type and cross 2 units, neuromonitoring sep,mep,eeg, regular bed, desouza, supine spencer, icu pos    CORONARY ANGIOGRAPHY Bilateral 02/24/2021    Procedure: ANGIOGRAM, CORONARY ARTERY;  Surgeon: Cresencio Ridley MD;  Location: [facility] CATH LAB;  Service: Cardiology;  Laterality: Bilateral;  Covid test needed - ok per Danay SSCU. Pt. w/o transportation or family    CRANIOTOMY, FOR ANEURYSM OF VERTEBROBASILAR OR CAROTID CIRCULATION Left 11/20/2024    Procedure: CRANIOTOMY, FOR ANEURYSM OF VERTEBROBASILAR OR CAROTID CIRCULATION;  Surgeon: Sejal Simon MD;  Location: [facility] OR [address] [place] [address];  Service: Neurosurgery;  Laterality: Left;  with scalp block    DISSECTION, NERVE, USING OPERATING MICROSCOPE Left 11/20/2024    Procedure: DISSECTION, NERVE, USING OPERATING MICROSCOPE;  Surgeon: Sejal Simon MD;  Location: [facility] OR [address] [place] [address];  Service: Neurosurgery;  Laterality: Left;  with scalp block    ENDOSCOPIC ULTRASOUND OF UPPER GASTROINTESTINAL TRACT N/A 03/17/2021    Procedure: ULTRASOUND, UPPER GI TRACT, ENDOSCOPIC;  Surgeon: Gerald Anaya MD;  Location: [facility] ENDO ([address] [place] [address]);  Service: Endoscopy;  Laterality: N/A;  Pt unable to get a ride for swab. Will do rapid test at 8:30 - ttr     ENDOSCOPIC ULTRASOUND OF UPPER GASTROINTESTINAL TRACT N/A 01/13/2022    Procedure: ULTRASOUND, UPPER GI TRACT, ENDOSCOPIC;  Surgeon: Kevin Carballo MD;  Location: [facility] ENDO (2ND FLR);  Service: Endoscopy;  Laterality: N/A;  blood thinner approval received, see telephone encounter 1/7/22-BB  rapid  instructions given verbally and sent to address on file in pt's chart-BB    ENDOSCOPIC ULTRASOUND OF UPPER GASTROINTESTINAL TRACT N/A 10/11/2022    Procedure: ULTRASOUND, UPPER GI TRACT, ENDOSCOPIC;  Surgeon: Dequan Ridley MD;  Location: [facility] ENDO (2ND FLR);  Service: Endoscopy;  Laterality: N/A;    ENDOSCOPIC ULTRASOUND OF UPPER GASTROINTESTINAL TRACT N/A 01/24/2024    Procedure: ULTRASOUND, UPPER GI TRACT, ENDOSCOPIC;  Surgeon: Kevin Carballo MD;  Location: [facility] ENDO (2ND FLR);  Service: Endoscopy;  Laterality: N/A;    ENDOSCOPIC ULTRASOUND OF UPPER GASTROINTESTINAL TRACT N/A 03/05/2024    Procedure: ULTRASOUND, UPPER GI TRACT, ENDOSCOPIC;  Surgeon: Kevin Carballo MD;  Location: [facility] (2ND FLR);  Service: Endoscopy;  Laterality: N/A;  1/25 portal instr-Repeat theEUS at the next available appointment because the preparation was poor. 48hrs of liquid. Ozempic 7 day hold-eliquis approved Dr. Pelaez TE 12/12/2023-tt  2/28-precall complete-pt verbalized udnerstanding of holding Oz    ESOPHAGOGASTRODUODENOSCOPY N/A 02/05/2021    Procedure: EGD (ESOPHAGOGASTRODUODENOSCOPY);  Surgeon: Matthew Hernadez MD;  Location: [facility] ENDO (2ND FLR);  Service: Endoscopy;  Laterality: N/A;  BMI-58    Wt: 361#     COVID test at [facility] on 2/2-GT    ESOPHAGOGASTRODUODENOSCOPY N/A 03/17/2021    Procedure: EGD (ESOPHAGOGASTRODUODENOSCOPY);  Surgeon: Gerald Anaya MD;  Location: [facility] ENDO (2ND FLR);  Service: Endoscopy;  Laterality: N/A;    ESOPHAGOGASTRODUODENOSCOPY N/A 05/25/2021    Procedure: EGD (ESOPHAGOGASTRODUODENOSCOPY);  Surgeon: Kevin Carballo MD;  Location: [facility] ([address] [place] [address]);  Service: Endoscopy;  Laterality: N/A;   ESD 2 hours  Full COVID vaccination on file. ttr    ESOPHAGOGASTRODUODENOSCOPY N/A 01/13/2022    Procedure: EGD (ESOPHAGOGASTRODUODENOSCOPY);  Surgeon: Kevin Carballo MD;  Location: [facility] (2ND FLR);  Service: Endoscopy;  Laterality: N/A;  blood thinner approval, see telephone encounter 1/7/22-BB  rapid  instructions given verbally and sent to address on file in pt's chart-BB    ESOPHAGOGASTRODUODENOSCOPY N/A 10/11/2022    Procedure: EGD (ESOPHAGOGASTRODUODENOSCOPY);  Surgeon: Dequan Ridley MD;  Location: [facility] (2ND FLR);  Service: Endoscopy;  Laterality: N/A;  She is do for EGD/EUS now. Personal history of gastric cancer. Ca Coats #[id number].   Thanks,   Kevin Carballo. MD    Pt is fully vaccinated-DS  9/14/22-Approval to hold Eliquis rec'd from Dr. Pelaez (see telephone encounter 9/14/22)-DS  9/14/22-Ins    ESOPHAGOGASTRODUODENOSCOPY N/A 01/24/2024    Procedure: EGD (ESOPHAGOGASTRODUODENOSCOPY);  Surgeon: Kevin Carballo MD;  Location: [facility] (2ND FLR);  Service: Endoscopy;  Laterality: N/A;  12/8/23: instructions sent via portal. eliquis approval from MD Pelaez in telephone encounter (12/12/23) -GD  1/9-precall complete-MS    ESOPHAGOGASTRODUODENOSCOPY N/A 03/05/2024    Procedure: EGD (ESOPHAGOGASTRODUODENOSCOPY);  Surgeon: Kevin Carballo MD;  Location: [facility] (2ND FLR);  Service: Endoscopy;  Laterality: N/A;    EYE SURGERY      HYSTEROSCOPIC POLYPECTOMY OF UTERUS  01/10/2024    Procedure: POLYPECTOMY, UTERUS, HYSTEROSCOPIC;  Surgeon: Pina Pickens MD;  Location: [facility];  Service: OB/GYN;;    HYSTEROSCOPY WITH DILATION AND CURETTAGE OF UTERUS N/A 01/10/2024    Procedure: HYSTEROSCOPY, WITH DILATION AND CURETTAGE OF UTERUS;  Surgeon: Pina Pickens MD;  Location: [facility] OR;  Service: OB/GYN;  Laterality: N/A;    INJECTION OF ANESTHETIC AGENT AROUND NERVE Left 07/10/2018    Procedure: BLOCK, NERVE;  Surgeon: Samantha Vidal MD;  Location: [facility] PAIN MGT;  Service: Pain  Management;  Laterality: Left;  Genicular     INJECTION OF ANESTHETIC AGENT AROUND NERVE Left 07/19/2019    Procedure: Block, Nerve NERVE BLOCK GENICULAR WITH PHENOL 6%;  Surgeon: Samantha Vidal MD;  Location: [facility] PAIN MGT;  Service: Pain Management;  Laterality: Left;  NEEDS CONSENT    INSERTION OF TUNNELED CENTRAL VENOUS CATHETER (CVC) WITH SUBCUTANEOUS PORT N/A 07/09/2021    Procedure: INSERTION, PORT-A-CATH;  Surgeon: Mario Paz MD;  Location: [facility] CATH LAB;  Service: Radiology;  Laterality: N/A;  PORT PLACEMENT    RADIOFREQUENCY ABLATION Left 06/19/2020    Procedure: RADIOFREQUENCY ABLATION LEFT GENICULAR;  Surgeon: Tevin Clark MD;  Location: [facility] PAIN MGT;  Service: Pain Management;  Laterality: Left;  Left Genicular RFA    RADIOFREQUENCY ABLATION Left 01/22/2021    Procedure: RADIOFREQUENCY ABLATION LEFT GENICULAR;  Surgeon: Tevin Clark MD;  Location: [facility] PAIN MGT;  Service: Pain Management;  Laterality: Left;    RADIOFREQUENCY ABLATION Left 07/30/2021    Procedure: RADIOFREQUENCY ABLATION, GENICULAR COOLED NEED CONSENT;  Surgeon: Tevin Clark MD;  Location: [facility] PAIN MGT;  Service: Pain Management;  Laterality: Left;    RADIOFREQUENCY ABLATION Left 04/22/2022    Procedure: RADIOFREQUENCY ABLATION, GENICULAR COOLED , LEFT;  Surgeon: Tevin Clark MD;  Location: [facility] PAIN MGT;  Service: Pain Management;  Laterality: Left;    RADIOFREQUENCY ABLATION Left 12/23/2022    Procedure: RADIOFREQUENCY ABLATION LEFT GENICULAR COOLED CLEARED TO HOLD ELIQUIS 3 DAYS  NEEDS CONSENT;  Surgeon: Tevin Clark MD;  Location: [facility] PAIN MGT;  Service: Pain Management;  Laterality: Left;    TONSILLECTOMY       Family History       Problem Relation (Age of Onset)    No Known Problems Mother, Father          Tobacco Use    Smoking status: Some Days     Current packs/day: 0.00     Average packs/day: 1 pack/day for 53.9 years (53.7 ttl pk-yrs)     Types: Cigarettes     Start date: 1970     Last attempt to  quit: 2023     Years since quittin.9    Smokeless tobacco: Never    Tobacco comments:     currently smoking <5 cigarettes most days   Substance and Sexual Activity    Alcohol use: Yes     Alcohol/week: 1.0 standard drink of alcohol     Types: 1 Glasses of wine per week     Comment: rarely    Drug use: No    Sexual activity: Not Currently     Partners: Male     Review of Systems   Unable to perform ROS: Mental status change     Objective:     Vital Signs (Most Recent):  Temp: 99.4 °F (37.4 °C) (24 1116)  Pulse: 69 (24 1447)  Resp: 18 (24 1444)  BP: (!) 140/64 (24 1116)  SpO2: (!) 94 % (24 111) Vital Signs (24h Range):  Temp:  [98 °F (36.7 °C)-99.4 °F (37.4 °C)] 99.4 °F (37.4 °C)  Pulse:  [65-87] 69  Resp:  [16-20] 18  SpO2:  [93 %-95 %] 94 %  BP: (128-153)/(64-74) 140/64     Weight: 123.4 kg (272 lb 0.8 oz)  Body mass index is 43.91 kg/m².     Physical Exam  NAD. Nontoxic  On the R inner thigh, there is an area with three opening spontaneously draining seropurulent drainage. There is no surrounding erythema, warmth, induration, or fluctuance        I have reviewed all pertinent lab results within the past 24 hours.  CBC:   Recent Labs   Lab 24  0309   WBC 12.61   RBC 2.72*   HGB 8.9*   HCT 28.4*      *   MCH 32.7*   MCHC 31.3*       Significant Diagnostics:  I have reviewed all pertinent imaging results/findings within the past 24 hours.

## 2024-11-30 PROCEDURE — 25000003 PHARM REV CODE 250: Mod: HCNC | Performed by: PHYSICIAN ASSISTANT

## 2024-11-30 PROCEDURE — 63600175 PHARM REV CODE 636 W HCPCS: Mod: HCNC | Performed by: NURSE PRACTITIONER

## 2024-11-30 PROCEDURE — 25000003 PHARM REV CODE 250: Mod: HCNC | Performed by: NURSE PRACTITIONER

## 2024-11-30 PROCEDURE — A4216 STERILE WATER/SALINE, 10 ML: HCPCS | Mod: HCNC | Performed by: NURSE PRACTITIONER

## 2024-11-30 PROCEDURE — 11000001 HC ACUTE MED/SURG PRIVATE ROOM: Mod: HCNC

## 2024-11-30 PROCEDURE — 99024 POSTOP FOLLOW-UP VISIT: CPT | Mod: HCNC,,, | Performed by: PHYSICIAN ASSISTANT

## 2024-11-30 PROCEDURE — 97535 SELF CARE MNGMENT TRAINING: CPT | Mod: HCNC,CO

## 2024-11-30 PROCEDURE — 97530 THERAPEUTIC ACTIVITIES: CPT | Mod: HCNC,CQ

## 2024-11-30 RX ADMIN — CITALOPRAM HYDROBROMIDE 10 MG: 10 TABLET ORAL at 08:11

## 2024-11-30 RX ADMIN — HEPARIN SODIUM 7500 UNITS: 5000 INJECTION INTRAVENOUS; SUBCUTANEOUS at 03:11

## 2024-11-30 RX ADMIN — SODIUM CHLORIDE 500 ML: 9 INJECTION, SOLUTION INTRAVENOUS at 10:11

## 2024-11-30 RX ADMIN — HYDRALAZINE HYDROCHLORIDE 50 MG: 25 TABLET ORAL at 03:11

## 2024-11-30 RX ADMIN — LEVETIRACETAM 1000 MG: 500 SOLUTION ORAL at 08:11

## 2024-11-30 RX ADMIN — LEVETIRACETAM 1000 MG: 500 SOLUTION ORAL at 09:11

## 2024-11-30 RX ADMIN — SULFAMETHOXAZOLE AND TRIMETHOPRIM 1 TABLET: 800; 160 TABLET ORAL at 08:11

## 2024-11-30 RX ADMIN — LISINOPRIL 40 MG: 20 TABLET ORAL at 08:11

## 2024-11-30 RX ADMIN — GABAPENTIN 600 MG: 250 SOLUTION ORAL at 05:11

## 2024-11-30 RX ADMIN — GABAPENTIN 600 MG: 250 SOLUTION ORAL at 10:11

## 2024-11-30 RX ADMIN — SULFAMETHOXAZOLE AND TRIMETHOPRIM 1 TABLET: 800; 160 TABLET ORAL at 09:11

## 2024-11-30 RX ADMIN — HYDRALAZINE HYDROCHLORIDE 50 MG: 25 TABLET ORAL at 05:11

## 2024-11-30 RX ADMIN — OXYCODONE 5 MG: 5 TABLET ORAL at 05:11

## 2024-11-30 RX ADMIN — HEPARIN SODIUM 7500 UNITS: 5000 INJECTION INTRAVENOUS; SUBCUTANEOUS at 05:11

## 2024-11-30 RX ADMIN — Medication 10 ML: at 09:11

## 2024-11-30 RX ADMIN — HYDRALAZINE HYDROCHLORIDE 50 MG: 25 TABLET ORAL at 09:11

## 2024-11-30 RX ADMIN — HEPARIN SODIUM 7500 UNITS: 5000 INJECTION INTRAVENOUS; SUBCUTANEOUS at 09:11

## 2024-11-30 RX ADMIN — ATORVASTATIN CALCIUM 20 MG: 20 TABLET, FILM COATED ORAL at 08:11

## 2024-11-30 RX ADMIN — GABAPENTIN 600 MG: 250 SOLUTION ORAL at 03:11

## 2024-11-30 NOTE — ASSESSMENT & PLAN NOTE
Small R inner thigh abscess noted with seropurulent drainage.  Gen Surg consulted 11/29:  This is spontaneously and adequately draining. No significant fluctuance or induration.     -No surgical intervention indicated or recommended at this time as this area is draining well  -CTM. Wound Care following.  -Bactrim started 11/29, continue x 7 days

## 2024-11-30 NOTE — SUBJECTIVE & OBJECTIVE
Interval History: NAEON. AFVSS. TCD yesterday c/f L MCA/DIANE vasospasm, repeat today pending. Pt has been clinically/neurologically stable. Tolerating diet but not hydrating much, I/O -605, 500cc NS bolus ordered. Planning for IPR placement.    Medications:  Continuous Infusions:  Scheduled Meds:   atorvastatin  20 mg Oral Daily    citalopram  10 mg Oral Daily    gabapentin  600 mg Oral Q8H    heparin (porcine)  7,500 Units Subcutaneous Q8H    hydrALAZINE  50 mg Oral Q8H    levetiracetam  1,000 mg Oral BID    lisinopriL  40 mg Oral Daily    sodium chloride 0.9%  500 mL Intravenous Once    sulfamethoxazole-trimethoprim 800-160mg  1 tablet Oral BID     PRN Meds:  Current Facility-Administered Medications:     acetaminophen, 650 mg, Oral, Q4H PRN    bisacodyL, 10 mg, Rectal, Daily PRN    hydrALAZINE, 10 mg, Intravenous, Q4H PRN    labetalol, 10 mg, Intravenous, Q4H PRN    morphine, 2 mg, Intravenous, Q6H PRN    naloxone, 0.4 mg, Intravenous, PRN    ondansetron, 4 mg, Intravenous, Q12H PRN    oxyCODONE, 5 mg, Oral, Q6H PRN    sodium chloride 0.9%, 10 mL, Intravenous, PRN     Review of Systems  Objective:     Weight: (!) 148.8 kg (328 lb)  Body mass index is 52.94 kg/m².  Vital Signs (Most Recent):  Temp: 97.5 °F (36.4 °C) (11/30/24 0730)  Pulse: 69 (11/30/24 0730)  Resp: 16 (11/30/24 0730)  BP: (!) 123/58 (11/30/24 0730)  SpO2: (!) 94 % (11/30/24 0730) Vital Signs (24h Range):  Temp:  [97.5 °F (36.4 °C)-99.4 °F (37.4 °C)] 97.5 °F (36.4 °C)  Pulse:  [62-87] 69  Resp:  [16-20] 16  SpO2:  [94 %-98 %] 94 %  BP: (109-140)/(54-64) 123/58                         Female External Urinary Catheter w/ Suction 11/21/24 1400 (Active)   Skin no redness;no breakdown 11/30/24 0405   Tolerance no signs/symptoms of discomfort 11/30/24 0405   Suction Continuous suction at 60 mmHg 11/30/24 0405   Date of last wick change 11/29/24 11/30/24 0405   Time of last wick change 2000 11/30/24 0405   Output (mL) 0 mL 11/28/24 0800          Physical  "Exam         Neurosurgery Physical Exam    General: well developed, well nourished, no distress.   Head: normocephalic  Neurologic: Alert and oriented. Thought content appropriate.  GCS: Motor: 6/Verbal: 4/Eyes: 4 GCS Total: 14  Mental Status: Awake, Alert, Oriented x 2  Language: aphasia improving  Speech: No dysarthria  Eyes: pupils equal, round, reactive to light with accomodation, EOMI.  Pulmonary: normal respirations, no signs of respiratory distress  Sensory: intact to light touch throughout  Motor Strength: Moves all extremities spontaneously with good tone. Grossly full strength upper and lower extremities. Follows commands x 4 antigravity. No abnormal movements seen.   Skin: Skin is warm, dry and intact.  Incision c/d/I.    Significant Labs:  Recent Labs   Lab 11/28/24  1101 11/29/24  0309 11/29/24  1001   GLU 95  --  105     --  146*   K 4.5  --  4.3   *  --  115*   CO2 21*  --  23   BUN 18  --  20   CREATININE 0.7  --  0.7   CALCIUM 9.5  --  9.8   MG  --  2.4  --      Recent Labs   Lab 11/28/24  1101 11/29/24  0309   WBC 14.04* 12.61   HGB 9.1* 8.9*   HCT 29.2* 28.4*    254     No results for input(s): "LABPT", "INR", "APTT" in the last 48 hours.  Microbiology Results (last 7 days)       ** No results found for the last 168 hours. **          All pertinent labs from the last 24 hours have been reviewed.    Significant Diagnostics:  I have reviewed and interpreted all pertinent imaging results/findings within the past 24 hours.  "

## 2024-11-30 NOTE — PLAN OF CARE
Problem: Adult Inpatient Plan of Care  Goal: Plan of Care Review  Outcome: Progressing  Goal: Optimal Comfort and Wellbeing  Outcome: Progressing  Goal: Readiness for Transition of Care  Outcome: Progressing     Problem: Wound  Goal: Optimal Coping  Outcome: Progressing  Goal: Optimal Functional Ability  Outcome: Progressing  Goal: Absence of Infection Signs and Symptoms  Outcome: Progressing  Goal: Optimal Pain Control and Function  Outcome: Progressing  Goal: Skin Health and Integrity  Outcome: Progressing     Problem: Infection  Goal: Absence of Infection Signs and Symptoms  Outcome: Progressing     Problem: Stroke, Intracerebral Hemorrhage  Goal: Effective Bowel Elimination  Outcome: Progressing  Goal: Optimal Cognitive Function  Outcome: Progressing  Goal: Effective Communication Skills  Outcome: Progressing  Goal: Optimal Functional Ability  Outcome: Progressing     No acute events per shift. US MCA done. Total out put per shift  900    . Continue tele normo sinus VSS/ .BM X0    PRN pain meds x 0 ,  ,no critical value labs per shift.  poc reviewed w/ pt/family. Pt needs s/b assistance  in feeding/ rolling on bed x2 . Takes pills whole  . Continue pureed d. Tolerate well. Poor intake of liquid. 500 NS bolus given     No deformity or limitation of movement

## 2024-11-30 NOTE — PROGRESS NOTES
Dario Glover - Neurosurgery ([facility])  Neurosurgery  Progress Note    Subjective:     History of Present Illness: Isabel Coats is a 73-year-old female past medical history of hypertension, obstructive sleep apnea on CPAP, suggestive heart failure, obesity, history of gastric cancer status post chemoradiation and DVT in currently on Eliquis. She presents to [facility] L pterional crani, 7 clips applied in proximity to large irregular L MCA bifurcation aneurysm and other adjacent small aneurysms, 1 clip applied across neck of aneurysm just proximal to anterior choroidal.  She also has a current smoker she was initially referred to Neurology for workup for dizziness. She had been complaining of vertigo multiple times a day for the past several months, associated with shortness of breath, palpitation, nausea, headache, and feeling faint. She is admitted to [facility] for a higher level of care.     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 CLEOPATRAS. TCD yesterday c/f L MCA/DIANE vasospasm, repeat today pending. Pt has been clinically/neurologically stable. Tolerating diet but not hydrating much, I/O -605, 500cc NS bolus ordered. Planning for IPR placement.    Medications:  Continuous Infusions:  Scheduled Meds:   atorvastatin  20 mg Oral Daily    citalopram  10 mg Oral Daily    gabapentin  600 mg Oral Q8H    heparin (porcine)  7,500 Units Subcutaneous Q8H    hydrALAZINE  50 mg Oral Q8H    levetiracetam  1,000 mg Oral BID    lisinopriL  40 mg Oral Daily    sodium chloride 0.9%  500 mL Intravenous Once    sulfamethoxazole-trimethoprim 800-160mg  1 tablet Oral BID     PRN Meds:  Current Facility-Administered Medications:     acetaminophen, 650 mg, Oral, Q4H PRN    bisacodyL, 10 mg, Rectal, Daily PRN    hydrALAZINE, 10 mg,  Intravenous, Q4H PRN    labetalol, 10 mg, Intravenous, Q4H PRN    morphine, 2 mg, Intravenous, Q6H PRN    naloxone, 0.4 mg, Intravenous, PRN    ondansetron, 4 mg, Intravenous, Q12H PRN    oxyCODONE, 5 mg, Oral, Q6H PRN    sodium chloride 0.9%, 10 mL, Intravenous, PRN     Review of Systems  Objective:     Weight: (!) 148.8 kg (328 lb)  Body mass index is 52.94 kg/m².  Vital Signs (Most Recent):  Temp: 97.5 °F (36.4 °C) (11/30/24 0730)  Pulse: 69 (11/30/24 0730)  Resp: 16 (11/30/24 0730)  BP: (!) 123/58 (11/30/24 0730)  SpO2: (!) 94 % (11/30/24 0730) Vital Signs (24h Range):  Temp:  [97.5 °F (36.4 °C)-99.4 °F (37.4 °C)] 97.5 °F (36.4 °C)  Pulse:  [62-87] 69  Resp:  [16-20] 16  SpO2:  [94 %-98 %] 94 %  BP: (109-140)/(54-64) 123/58                         Female External Urinary Catheter w/ Suction 11/21/24 1400 (Active)   Skin no redness;no breakdown 11/30/24 0405   Tolerance no signs/symptoms of discomfort 11/30/24 0405   Suction Continuous suction at 60 mmHg 11/30/24 0405   Date of last wick change 11/29/24 11/30/24 0405   Time of last wick change 2000 11/30/24 0405   Output (mL) 0 mL 11/28/24 0800          Physical Exam         Neurosurgery Physical Exam    General: well developed, well nourished, no distress.   Head: normocephalic  Neurologic: Alert and oriented. Thought content appropriate.  GCS: Motor: 6/Verbal: 4/Eyes: 4 GCS Total: 14  Mental Status: Awake, Alert, Oriented x 2  Language: aphasia improving  Speech: No dysarthria  Eyes: pupils equal, round, reactive to light with accomodation, EOMI.  Pulmonary: normal respirations, no signs of respiratory distress  Sensory: intact to light touch throughout  Motor Strength: Moves all extremities spontaneously with good tone. Grossly full strength upper and lower extremities. Follows commands x 4 antigravity. No abnormal movements seen.   Skin: Skin is warm, dry and intact.  Incision c/d/I.    Significant Labs:  Recent Labs   Lab 11/28/24  1101 11/29/24  1839  "11/29/24  1001   GLU 95  --  105     --  146*   K 4.5  --  4.3   *  --  115*   CO2 21*  --  23   BUN 18  --  20   CREATININE 0.7  --  0.7   CALCIUM 9.5  --  9.8   MG  --  2.4  --      Recent Labs   Lab 11/28/24  1101 11/29/24  0309   WBC 14.04* 12.61   HGB 9.1* 8.9*   HCT 29.2* 28.4*    254     No results for input(s): "LABPT", "INR", "APTT" in the last 48 hours.  Microbiology Results (last 7 days)       ** No results found for the last 168 hours. **          All pertinent labs from the last 24 hours have been reviewed.    Significant Diagnostics:  I have reviewed and interpreted all pertinent imaging results/findings within the past 24 hours.  Assessment/Plan:     * Cerebral aneurysm  Isabel Coats is a 73-year-old female past medical history of hypertension, obstructive sleep apnea on CPAP, suggestive heart failure, obesity, history of gastric cancer status post chemoradiation and DVT in currently on Eliquis. She presents to [facility] s/p L pterional crani on 11/20 with 7 clips applied in proximity to large irregular L MCA bifurcation aneurysm and other adjacent small aneurysms, 1 clip applied across neck of aneurysm just proximal to anterior choroidal.  After surgery she seemed to be gradually waking up appropriately overnight, then AM of POD1 around shift change had an unexplained decline in exam during which stat CT showed IPH in the right side of her cerebellum. She became less responsive as well as developed a L gaze preference and dense aphasia. She had a perfusion scan with questionable perfusion deficit to the left anterior temporal lobe and milrinone was started with seemingly some clinical improvement. Now on POD5 has been weaned off milrinone, gaze preference resolved, following commands x4 antigravity, able to state name but still remains aphasic.    Imaging:  CTA Head 11/20: expected post-op changes  CTH 11/21 8 AM: right sided cerebellar IPH  CTA 11/21 10 AM: stable IPH, possible " frontal contusion  CTP 11/21 4 PM: possible left anterior temporal perfusion deficit in territory of branches distal to MCA clips   MRI brain w wo 11/22: no strokes, post op changes, stable cerebellar bleed. some edema in right frontal lobe.    Plans:  -TTF 11/26  -q4h neurochecks/vitals  -SBP <160  -drain removed 11/25  -Continue to monitor respiratory status, on room air  -keppra 1g BID postop. cEEG negative during initial AMS POD1  -Cerebral vasospasm: weaned off milrinone prior to step down, TCDs 11/28 w/o evidence of vasospasm on left, unable to test right vessels. TCDs 11/29 with mild L MCA/DIANE vasospasm.  - Continue daily TCDs x 14 days.  - Keep euvolemic to net positive. Bolus PRN.   - Strict I/O. Please document I/O every 4 hours.   -PT/OT/OOB  -SLP - tolerating current diet. Continue SLP. Encourage PO fluids since NGT removed 11/28. Calorie count pending. RD following.  -Continue to monitor clinically, notify NSGY immediately with any changes in neuro status   -SQH DVT ppx    Dispo: pending placement to IPR    Abscess  Small R inner thigh abscess noted with seropurulent drainage.  Gen Surg consulted 11/29:  This is spontaneously and adequately draining. No significant fluctuance or induration.     -No surgical intervention indicated or recommended at this time as this area is draining well  -CTM. Wound Care following.  -Bactrim started 11/29, continue x 7 days    Pure hypercholesterolemia  - home lipitor 20       Chronic diastolic congestive heart failure    Latest ECHO  Results for orders placed during the hospital encounter of 10/22/24    Echo    Interpretation Summary    Left Ventricle: The left ventricle is normal in size. Normal wall thickness. There is normal systolic function with a visually estimated ejection fraction of 60 - 65%. There is normal diastolic function.    Right Ventricle: Normal right ventricular cavity size. Wall thickness is normal. Systolic function is normal.    Left Atrium: Left  atrium is severely dilated.    Right Atrium: Right atrium is dilated.    Aortic Valve: The aortic valve is a trileaflet valve. There is moderate aortic valve sclerosis. Mildly restricted motion.    Tricuspid Valve: There is mild regurgitation.    IVC/SVC: Normal venous pressure at 3 mmHg.    Current Heart Failure Medications  hydrALAZINE injection 10 mg, Every 4 hours PRN, Intravenous  labetalol 20 mg/4 mL (5 mg/mL) IV syring, Every 4 hours PRN, Intravenous  hydrALAZINE tablet 50 mg, Every 8 hours, Oral  lisinopriL tablet 40 mg, Daily, Oral    Plan  - Monitor strict I&Os and daily weights.    - On telemetry  - The patient's volume status is stable        Hannah Coronado PA-C  Neurosurgery  [facility]ra[facility] Glover - Neurosurgery ([facility])

## 2024-11-30 NOTE — PLAN OF CARE
POC reviewed and updated with the patient at the bedside. Questions regarding POC were encouraged and addressed. VSS, see flowsheets. Tele maintained per order.  NAEON.  Patient is AOx 2 at this time. Fall and safety precautions maintained, no signs of injury noted during shift. Pain management utilizing PRN pain medications effective, see MAR for administration details.  Upon exiting room, patient's bed locked in low position, side rails up x 3, bed alarm set, with call light within reach. Instructed patient to call staff for mobility, verbalized understanding.  No acute signs of distress noted at this time.     Problem: Adult Inpatient Plan of Care  Goal: Plan of Care Review  Outcome: Progressing  Goal: Patient-Specific Goal (Individualized)  Outcome: Progressing  Goal: Absence of Hospital-Acquired Illness or Injury  Outcome: Progressing  Goal: Optimal Comfort and Wellbeing  Outcome: Progressing  Goal: Readiness for Transition of Care  Outcome: Progressing     Problem: Bariatric Environmental Safety  Goal: Safety Maintained with Care  Outcome: Progressing     Problem: Wound  Goal: Optimal Coping  Outcome: Progressing  Goal: Optimal Functional Ability  Outcome: Progressing  Goal: Absence of Infection Signs and Symptoms  Outcome: Progressing  Goal: Improved Oral Intake  Outcome: Progressing  Goal: Optimal Pain Control and Function  Outcome: Progressing  Goal: Skin Health and Integrity  Outcome: Progressing  Goal: Optimal Wound Healing  Outcome: Progressing     Problem: Infection  Goal: Absence of Infection Signs and Symptoms  Outcome: Progressing     Problem: Stroke, Intracerebral Hemorrhage  Goal: Optimal Coping  Outcome: Progressing  Goal: Effective Bowel Elimination  Outcome: Progressing  Goal: Optimal Cerebral Tissue Perfusion  Outcome: Progressing  Goal: Optimal Cognitive Function  Outcome: Progressing  Goal: Effective Communication Skills  Outcome: Progressing  Goal: Optimal Functional Ability  Outcome:  Progressing  Goal: Optimal Nutrition Intake  Outcome: Progressing  Goal: Optimal Pain Control and Function  Outcome: Progressing  Goal: Effective Oxygenation and Ventilation  Outcome: Progressing  Goal: Improved Sensorimotor Function  Outcome: Progressing  Goal: Safe and Effective Swallow  Outcome: Progressing  Goal: Effective Urinary Elimination  Outcome: Progressing     Problem: Fall Injury Risk  Goal: Absence of Fall and Fall-Related Injury  Outcome: Progressing     Problem: Skin Injury Risk Increased  Goal: Skin Health and Integrity  Outcome: Progressing     Problem: Restraint, Nonviolent  Goal: Absence of Harm or Injury  Outcome: Progressing

## 2024-11-30 NOTE — PT/OT/SLP PROGRESS
Occupational Therapy   Co-Treatment with PT  A client care conference was completed by the OTR and the SALTER prior to treatment by the OTR to discuss the patient's POC and current status.   Co-treatment performed due to patient's multiple deficits requiring two skilled therapists to appropriately and safely assess patient's strength, ADL performance, and endurance while facilitating functional tasks in addition to accommodating for patient's activity tolerance.      Name: Ca Coats  MRN: [id number]  Admitting Diagnosis:  Cerebral aneurysm  10 Days Post-Op    Recommendations:     Discharge Recommendations: High Intensity Therapy  Discharge Equipment Recommendations:  hospital bed, lift device, wheelchair  Barriers to discharge:  None    Assessment:     Ca Coats is a 73 y.o. female with a medical diagnosis of Cerebral aneurysm.  She presents with the following performance deficits affecting function are weakness, gait instability, decreased upper extremity function, decreased ROM, decreased lower extremity function, impaired balance, impaired endurance, decreased safety awareness, impaired cognition, impaired self care skills, impaired functional mobility, decreased coordination. Pt agreeable to tx session this date and demonstrated good efforts this date. Pt alert and oriented to self, place, time.  Pt able to maintain good static sitting balance EOB while performing self-feeding and grooming tasks this date. Pt with decreased static/dynamic standing tolerance limiting her functional mobility as well. She will continue to benefit from OT services to safely maximize her independence and ensure safe return to [facility] for daily occupational performance.     Rehab Prognosis:  Good; patient would benefit from acute skilled OT services to address these deficits and reach maximum level of function.       Plan:     Patient to be seen 4 x/week to address the above listed problems via self-care/home  management, therapeutic activities, therapeutic exercises, neuromuscular re-education  Plan of Care Expires: 12/24/24  Plan of Care Reviewed with: patient    Subjective     Chief Complaint: Pt reports she does not perform jailene-care due to  discomfort of R inner thigh abscess  Patient/Family Comments/goals: Pt agreeable to tx session  Pain/Comfort:  Pain Rating 1: 0/10    Objective:     Communicated with: nurse prior to session.  Patient found supine HOB elevated ~30* degrees with telemetry, PureWick, pressure relief boots upon OT entry to room.    General Precautions: Standard, aphasia, aspiration, fall    Orthopedic Precautions:N/A  Braces: N/A  Respiratory Status: Room air     Occupational Performance:     Bed Mobility:    Patient completed Rolling/Turning to Left with  maximal assistance and with side rail; max verbal cues provided by PTA  for technique  Patient completed Rolling/Turning to Right with maximal assistance and with side rail; max verbal cues provided by PTA for technique  Patient completed Scooting/Bridging with maximal assistance and 2 persons; total A x 2 persons to scoot towards HOB in Trendelenburg position via draw sheet/shira pad  Patient completed Supine to Sit with moderate assistance and 2 persons, HOB elevated via draw sheet  Patient completed Sit to Supine with total assistance and 2 persons for trunk and B LE management , HOB flat    Functional Mobility/Transfers:  Patient completed Sit <> Stand Transfer with maximal assistance and of 2 persons  with  no assistive device and hand-held assist   Writing therapist prompted pt to take a step to her L and pt verbalized she was unable to and returned seated EOB with Max A x 2 persons.   -R knee blocked in standing due to one occurrence of  buckling.  Pt completed sitting EOB ~ 10 minutes with SBA while performing feeding and grooming tasks this date.  Activities of Daily Living:  Feeding:  stand by assistance as pt completed eating pureed  breakfast while seated EOB with bedside table using R UE  Grooming: stand by assistance as pt completed washing face with R UE seated EOB; min verbal cues provided to wipe corner of her eye  Lower Body Dressing: total assistance to doff and jose pressure relief boots on B LE in supine  Toileting: total assistance 2/2 Pure wick in use; total A to doff soiled purewick. Writing therapist prompted pt to perform perineal hygiene with R UE in supine and pt politely declined stating she lets staff complete it due to abscess. Total A for perineal hygiene 2/2 pt soiled from voiding and new purewick donned.      [facility] 6 Click ADL: 14    Treatment & Education:  Pt performed bed mobility, functional mobility/transfers, and ADLs as documented above.   Pt educated and instructed on the following:  OT POC  Role of SALTER  Use of call bell for all assistance  Addressed questions and /or concerns within SALTER scope of practice  Pt verbalized understanding     Patient left supine HOB elevate ~30* degrees  with purple wedge on L side, all lines intact, call button in reach, and pt appears in NAD    GOALS:   Multidisciplinary Problems       Occupational Therapy Goals          Problem: Occupational Therapy    Goal Priority Disciplines Outcome Interventions   Occupational Therapy Goal     OT, PT/OT Progressing    Description: Goals to be met by: 12/24/24     Patient will increase functional independence with ADLs by performing:    UE Dressing with Stand-by Assistance.  LE Dressing with Minimal Assistance.  Grooming while seated with Stand-by Assistance.  Toileting from toilet/bedside commode with Minimal Assistance for hygiene and clothing management.   Supine to sit with Minimal Assistance.  Toilet transfer to toilet/bedside commode with Minimal Assistance.    Patient requires a hospital bed for positioning of the body in ways that are not feasible with an ordinary bed. The patient requires special positioning for pain relief, limited  mobility, and/or being unable to independently make changes in body position without the use of a hospital bed. Pillows and wedges will not be adequate for resolving these positional issues.      Patient has a mobility limitation that significantly impairs their ability to participate in one or more mobility related activities of daily living in customary locations in the home. The mobility limitation cannot be sufficiently resolved by the use of a cane or walker. The use of a manual wheelchair will greatly improve the patient's ability to participate in MRADLs. The patient will use the wheelchair on a regular basis at home. They have expressed their willingness to use a manual wheelchair in the home, and have a caregiver who is available and willing to assist with the wheelchair if needed.                            Time Tracking:     OT Date of Treatment: 11/30/24  OT Start Time: 0858  OT Stop Time: 0924  OT Total Time (min): 26 min    Billable Minutes:Self Care/Home Management 26    OT/RICHARD: RICHARD     Number of RICHARD visits since last OT visit: 1    11/30/2024

## 2024-11-30 NOTE — PT/OT/SLP PROGRESS
"Physical Therapy Treatment  Co Treatment with Occupational Therapy    Patient Name:  Ca Coats   MRN:  [id number]    Recommendations:     Discharge Recommendations: High Intensity Therapy  Discharge Equipment Recommendations: hospital bed, lift device, wheelchair  Barriers to discharge: None    Assessment:     Ca Coats is a 73 y.o. female admitted with a medical diagnosis of Cerebral aneurysm.  She presents with the following impairments/functional limitations: weakness, impaired endurance, impaired self care skills, impaired functional mobility, gait instability, impaired balance, impaired cognition, decreased upper extremity function, decreased lower extremity function.    Pt met with HOB elevated, A&O x3, and agreeable to session. Pt tolerates session well with emphasis on bed mobility, sitting balance, and transfers. Pt continues to require moderate A x2 persons with Sit to Supine transfers. Pt requires increased assistance with Sit to Stand transfers this session compared to previous PT session. Pt will continue to benefit from skilled therapy services.    Rehab Prognosis: Good; patient would benefit from acute skilled PT services to address these deficits and reach maximum level of function.    Recent Surgery: Procedure(s) (LRB):  CRANIOTOMY, WITH ANEURYSM CLIPPING W/ STEALTH (Left)  CRANIOTOMY, FOR ANEURYSM OF VERTEBROBASILAR OR CAROTID CIRCULATION (Left)  DISSECTION, NERVE, USING OPERATING MICROSCOPE (Left) 10 Days Post-Op    Plan:     During this hospitalization, patient to be seen 4 x/week to address the identified rehab impairments via gait training, therapeutic activities, therapeutic exercises, neuromuscular re-education and progress toward the following goals:    Plan of Care Expires:  12/24/24    Subjective     Chief Complaint: none stated  Patient/Family Comments/goals: "thank you, I love you"  Pain/Comfort:  Pain Rating 1: 0/10  Pain Rating Post-Intervention 1: " 0/10      Objective:     Communicated with RN (Yash) prior to session.  Patient found HOB elevated with telemetry, PureWick, pressure relief boots upon PTA entry to room.     General Precautions: Standard, aphasia, aspiration  Orthopedic Precautions: N/A  Braces: N/A  Respiratory Status: Room air    Cognition:    Patient is oriented to Person, Place, Time  Command following: Follows one-step verbal commands  Fluency: clear/fluent     Functional Mobility:  Bed Mobility:     Rolling Left:  maximal assistance  Rolling Right: maximal assistance  Supine to Sit: moderate assistance x2 persons for Trunk/BLEs  Sit to Supine: total assistance x2 persons for Trunk/BLEs  Transfers:     Sit to Stand:  x2 trials from EOB maximal assistance x2 persons with no AD/JHON HHA and JHON knees blocked  Balance:   Sitting Balance at EOB:  Level of Assist Required: Stand-by Assistance  Deviations noted: slouched posture  Total Time Sitting EOB: 10 minutes    Standing Balance:  Level of Assist Required: Maximum Assistance  Deviations noted: flexed posture, decreased hip extension, R knee buckling  Verbal and tactile cues provided for upright posture, scapular retraction, gluteal activation. Assistance provided for R knee extension  Total Time Standing: ~ 20 seconds    AM-PAC 6 CLICK MOBILITY  Turning over in bed (including adjusting bedclothes, sheets and blankets)?: 2  Sitting down on and standing up from a chair with arms (e.g., wheelchair, bedside commode, etc.): 2  Moving from lying on back to sitting on the side of the bed?: 2  Moving to and from a bed to a chair (including a wheelchair)?: 1  Need to walk in hospital room?: 1  Climbing 3-5 steps with a railing?: 1  Basic Mobility Total Score: 9       Treatment & Education:  Patient provided with daily orientation and goals of this PT session.     Pt assisted with perianal hygiene, shira pad, and Pure wick change d/t found soiled with urine.     Patient provided with daily orientation  and goals of this PT session. They were educated to call for assistance and to transfer with hospital staff only.  Also, pt was educated on the effects of prolonged immobility and the importance of performing OOB activity and exercises to promote healing and reduce recovery time    Co tx performed with OT due to patient need for 2 skilled therapist to ensure patient and staff safety and to accommondate for activity tolerance.    Patient left HOB elevated and wedged to R side with all lines intact, call button in reach, and RN notified.    GOALS:   Multidisciplinary Problems       Physical Therapy Goals          Problem: Physical Therapy    Goal Priority Disciplines Outcome Interventions   Physical Therapy Goal     PT, PT/OT Progressing    Description: Goals to be met by: 2024     Patient will increase functional independence with mobility by performin. Supine to sit with MInimal Assistance  2. Sit to supine with Minimal Assistance  3. Sit to stand transfer with Moderate Assistance  4. Bed to chair transfer with Maximum Assistance using LRAD  5. Gait  x 5 feet with Maximum Assistance using LRAD.   6. Sitting at edge of bed x5 minutes with Stand-by Assistance  7. Lower extremity exercise program x15 reps per handout, with assistance as needed    Hospital Bed - Patient requires a hospital bed for positioning of the body in ways that are not feasible with an ordinary bed. The patient requires special positioning for pain relief, limited mobility, and/or being unable to independently make changes in body position without the use of a hospital bed. Pillows and wedges will not be adequate for resolving these positional issues.     Wheelchair - Patient has a mobility limitation that significantly impairs their ability to participate in one or more mobility related activities of daily living in customary locations in the home. The mobility limitation cannot be sufficiently resolved by the use of a cane or  walker. The use of a manual wheelchair will greatly improve the patient's ability to participate in MRADLs. The patient will use the wheelchair on a regular basis at home. They have expressed their willingness to use a manual wheelchair in the home, and have a caregiver who is available and willing to assist with the wheelchair if needed                         Time Tracking:     PT Received On: 11/30/24  PT Start Time: 0858     PT Stop Time: 0924  PT Total Time (min): 26 min     Billable Minutes: Therapeutic Activity 26    Treatment Type: Treatment  PT/PTA: PTA     Number of PTA visits since last PT visit: 2     11/30/2024

## 2024-11-30 NOTE — ASSESSMENT & PLAN NOTE
Isabel Coats is a 73-year-old female past medical history of hypertension, obstructive sleep apnea on CPAP, suggestive heart failure, obesity, history of gastric cancer status post chemoradiation and DVT in currently on Eliquis. She presents to [facility] s/p L pterional crani on 11/20 with 7 clips applied in proximity to large irregular L MCA bifurcation aneurysm and other adjacent small aneurysms, 1 clip applied across neck of aneurysm just proximal to anterior choroidal.  After surgery she seemed to be gradually waking up appropriately overnight, then AM of POD1 around shift change had an unexplained decline in exam during which stat CT showed IPH in the right side of her cerebellum. She became less responsive as well as developed a L gaze preference and dense aphasia. She had a perfusion scan with questionable perfusion deficit to the left anterior temporal lobe and milrinone was started with seemingly some clinical improvement. Now on POD5 has been weaned off milrinone, gaze preference resolved, following commands x4 antigravity, able to state name but still remains aphasic.    Imaging:  CTA Head 11/20: expected post-op changes  CTH 11/21 8 AM: right sided cerebellar IPH  CTA 11/21 10 AM: stable IPH, possible frontal contusion  CTP 11/21 4 PM: possible left anterior temporal perfusion deficit in territory of branches distal to MCA clips   MRI brain w wo 11/22: no strokes, post op changes, stable cerebellar bleed. some edema in right frontal lobe.    Plans:  -TTF 11/26  -q4h neurochecks/vitals  -SBP <160  -drain removed 11/25  -Continue to monitor respiratory status, on room air  -keppra 1g BID postop. cEEG negative during initial AMS POD1  -Cerebral vasospasm: weaned off milrinone prior to step down, TCDs 11/28 w/o evidence of vasospasm on left, unable to test right vessels. TCDs 11/29 with mild L MCA/DIANE vasospasm.  - Continue daily TCDs x 14 days.  - Keep euvolemic to net positive. Bolus PRN.   - Strict I/O.  Please document I/O every 4 hours.   -PT/OT/OOB  -SLP - tolerating current diet. Continue SLP. Encourage PO fluids since NGT removed 11/28. Calorie count pending. RD following.  -Continue to monitor clinically, notify NSGY immediately with any changes in neuro status   -SQH DVT ppx    Dispo: pending placement to [facility]

## 2024-12-01 LAB
ALBUMIN SERPL BCP-MCNC: 2.7 G/DL (ref 3.5–5.2)
ALP SERPL-CCNC: 80 U/L (ref 40–150)
ALT SERPL W/O P-5'-P-CCNC: 13 U/L (ref 10–44)
ANION GAP SERPL CALC-SCNC: 5 MMOL/L (ref 8–16)
AST SERPL-CCNC: 15 U/L (ref 10–40)
BASOPHILS # BLD AUTO: 0.03 K/UL (ref 0–0.2)
BASOPHILS NFR BLD: 0.3 % (ref 0–1.9)
BILIRUB SERPL-MCNC: 0.4 MG/DL (ref 0.1–1)
BUN SERPL-MCNC: 19 MG/DL (ref 8–23)
CALCIUM SERPL-MCNC: 9.6 MG/DL (ref 8.7–10.5)
CHLORIDE SERPL-SCNC: 112 MMOL/L (ref 95–110)
CO2 SERPL-SCNC: 23 MMOL/L (ref 23–29)
CREAT SERPL-MCNC: 0.8 MG/DL (ref 0.5–1.4)
DIFFERENTIAL METHOD BLD: ABNORMAL
EOSINOPHIL # BLD AUTO: 0.2 K/UL (ref 0–0.5)
EOSINOPHIL NFR BLD: 1.7 % (ref 0–8)
ERYTHROCYTE [DISTWIDTH] IN BLOOD BY AUTOMATED COUNT: 14.2 % (ref 11.5–14.5)
EST. GFR  (NO RACE VARIABLE): >60 ML/MIN/1.73 M^2
GLUCOSE SERPL-MCNC: 94 MG/DL (ref 70–110)
HCT VFR BLD AUTO: 30.2 % (ref 37–48.5)
HGB BLD-MCNC: 9.1 G/DL (ref 12–16)
IMM GRANULOCYTES # BLD AUTO: 0.06 K/UL (ref 0–0.04)
IMM GRANULOCYTES NFR BLD AUTO: 0.6 % (ref 0–0.5)
LYMPHOCYTES # BLD AUTO: 2.2 K/UL (ref 1–4.8)
LYMPHOCYTES NFR BLD: 20.9 % (ref 18–48)
MAGNESIUM SERPL-MCNC: 2.3 MG/DL (ref 1.6–2.6)
MCH RBC QN AUTO: 32.2 PG (ref 27–31)
MCHC RBC AUTO-ENTMCNC: 30.1 G/DL (ref 32–36)
MCV RBC AUTO: 107 FL (ref 82–98)
MONOCYTES # BLD AUTO: 1.3 K/UL (ref 0.3–1)
MONOCYTES NFR BLD: 12.6 % (ref 4–15)
NEUTROPHILS # BLD AUTO: 6.6 K/UL (ref 1.8–7.7)
NEUTROPHILS NFR BLD: 63.9 % (ref 38–73)
NRBC BLD-RTO: 0 /100 WBC
PHOSPHATE SERPL-MCNC: 2.7 MG/DL (ref 2.7–4.5)
PLATELET # BLD AUTO: 297 K/UL (ref 150–450)
PMV BLD AUTO: 11.1 FL (ref 9.2–12.9)
POTASSIUM SERPL-SCNC: 4.7 MMOL/L (ref 3.5–5.1)
PROT SERPL-MCNC: 6.1 G/DL (ref 6–8.4)
RBC # BLD AUTO: 2.83 M/UL (ref 4–5.4)
SODIUM SERPL-SCNC: 140 MMOL/L (ref 136–145)
WBC # BLD AUTO: 10.36 K/UL (ref 3.9–12.7)

## 2024-12-01 PROCEDURE — 11000001 HC ACUTE MED/SURG PRIVATE ROOM: Mod: HCNC

## 2024-12-01 PROCEDURE — 80053 COMPREHEN METABOLIC PANEL: CPT | Mod: HCNC | Performed by: PHYSICIAN ASSISTANT

## 2024-12-01 PROCEDURE — 94761 N-INVAS EAR/PLS OXIMETRY MLT: CPT | Mod: HCNC

## 2024-12-01 PROCEDURE — 84100 ASSAY OF PHOSPHORUS: CPT | Mod: HCNC | Performed by: PHYSICIAN ASSISTANT

## 2024-12-01 PROCEDURE — 25000003 PHARM REV CODE 250: Mod: HCNC | Performed by: PHYSICIAN ASSISTANT

## 2024-12-01 PROCEDURE — 85025 COMPLETE CBC W/AUTO DIFF WBC: CPT | Mod: HCNC | Performed by: PHYSICIAN ASSISTANT

## 2024-12-01 PROCEDURE — 25000003 PHARM REV CODE 250: Mod: HCNC

## 2024-12-01 PROCEDURE — 36415 COLL VENOUS BLD VENIPUNCTURE: CPT | Mod: HCNC | Performed by: PHYSICIAN ASSISTANT

## 2024-12-01 PROCEDURE — 83735 ASSAY OF MAGNESIUM: CPT | Mod: HCNC | Performed by: PHYSICIAN ASSISTANT

## 2024-12-01 PROCEDURE — 63600175 PHARM REV CODE 636 W HCPCS: Mod: HCNC | Performed by: NURSE PRACTITIONER

## 2024-12-01 RX ADMIN — SULFAMETHOXAZOLE AND TRIMETHOPRIM 1 TABLET: 800; 160 TABLET ORAL at 09:12

## 2024-12-01 RX ADMIN — LEVETIRACETAM 1000 MG: 500 SOLUTION ORAL at 09:12

## 2024-12-01 RX ADMIN — GABAPENTIN 600 MG: 250 SOLUTION ORAL at 02:12

## 2024-12-01 RX ADMIN — CITALOPRAM HYDROBROMIDE 10 MG: 10 TABLET ORAL at 09:12

## 2024-12-01 RX ADMIN — HYDRALAZINE HYDROCHLORIDE 50 MG: 25 TABLET ORAL at 05:12

## 2024-12-01 RX ADMIN — SODIUM CHLORIDE 500 ML: 9 INJECTION, SOLUTION INTRAVENOUS at 10:12

## 2024-12-01 RX ADMIN — HYDRALAZINE HYDROCHLORIDE 50 MG: 25 TABLET ORAL at 02:12

## 2024-12-01 RX ADMIN — HEPARIN SODIUM 7500 UNITS: 5000 INJECTION INTRAVENOUS; SUBCUTANEOUS at 02:12

## 2024-12-01 RX ADMIN — GABAPENTIN 600 MG: 250 SOLUTION ORAL at 05:12

## 2024-12-01 RX ADMIN — ATORVASTATIN CALCIUM 20 MG: 20 TABLET, FILM COATED ORAL at 09:12

## 2024-12-01 RX ADMIN — HYDRALAZINE HYDROCHLORIDE 50 MG: 25 TABLET ORAL at 10:12

## 2024-12-01 RX ADMIN — HEPARIN SODIUM 7500 UNITS: 5000 INJECTION INTRAVENOUS; SUBCUTANEOUS at 10:12

## 2024-12-01 RX ADMIN — GABAPENTIN 600 MG: 250 SOLUTION ORAL at 09:12

## 2024-12-01 RX ADMIN — LISINOPRIL 40 MG: 20 TABLET ORAL at 09:12

## 2024-12-01 RX ADMIN — HEPARIN SODIUM 7500 UNITS: 5000 INJECTION INTRAVENOUS; SUBCUTANEOUS at 05:12

## 2024-12-01 NOTE — PLAN OF CARE
Problem: Adult Inpatient Plan of Care  Goal: Plan of Care Review  Outcome: Progressing  Goal: Optimal Comfort and Wellbeing  Outcome: Progressing  Goal: Readiness for Transition of Care  Outcome: Progressing     Problem: Wound  Goal: Optimal Coping  Outcome: Progressing  Goal: Optimal Functional Ability  Outcome: Progressing     Problem: Fall Injury Risk  Goal: Absence of Fall and Fall-Related Injury  12/1/2024 1613 by Yash Grayson RN  Outcome: Progressing  12/1/2024 1611 by Yash Grayson RN  Outcome: Progressing     Problem: Skin Injury Risk Increased  Goal: Skin Health and Integrity  12/1/2024 1613 by Yash Grayson RN  Outcome: Progressing  12/1/2024 1611 by Yash Grayson RN  Outcome: Progressing     Problem: Fatigue  Goal: Improved Activity Tolerance  Outcome: Progressing   Q4 hour I/O . Plan is to keep euvolemic. PRN bolus available.   No acute events per shift. Total out put per shift 900     . Continue tele normo sinus .BM X . PRN pain meds x0  ,no critical value labs per shift. poc reviewed w/ pt/family. Pt needs assistance  in feeding/ rolling on bed x1 assist.  Takes pills whole.   Continue pureed diet.

## 2024-12-01 NOTE — PROGRESS NOTES
[facility] - Neurosurgery ([facility])  Neurosurgery  Progress Note    Subjective:     History of Present Illness: Isabel Coats is a 73-year-old female past medical history of hypertension, obstructive sleep apnea on CPAP, suggestive heart failure, obesity, history of gastric cancer status post chemoradiation and DVT in currently on Eliquis. She presents to [facility] L pterional crani, 7 clips applied in proximity to large irregular L MCA bifurcation aneurysm and other adjacent small aneurysms, 1 clip applied across neck of aneurysm just proximal to anterior choroidal.  She also has a current smoker she was initially referred to Neurology for workup for dizziness. She had been complaining of vertigo multiple times a day for the past several months, associated with shortness of breath, palpitation, nausea, headache, and feeling faint. She is admitted to [facility] for a higher level of care.     Post-Op Info:  Procedure(s) (LRB):  CRANIOTOMY, WITH ANEURYSM CLIPPING W/ STEALTH (Left)  CRANIOTOMY, FOR ANEURYSM OF VERTEBROBASILAR OR CAROTID CIRCULATION (Left)  DISSECTION, NERVE, USING OPERATING MICROSCOPE (Left)   11 Days Post-Op   Interval History: 12/1: NAEO. Neuro exam stable. Denies headache, nausea or vomiting. TCD wnl. Tolerating diet but not hydrating much, I/O around -1.1L, 500cc NS bolus ordered. Planning for IPR placement.    Medications:  Continuous Infusions:  Scheduled Meds:   atorvastatin  20 mg Oral Daily    citalopram  10 mg Oral Daily    gabapentin  600 mg Oral Q8H    heparin (porcine)  7,500 Units Subcutaneous Q8H    hydrALAZINE  50 mg Oral Q8H    levetiracetam  1,000 mg Oral BID    lisinopriL  40 mg Oral Daily    sulfamethoxazole-trimethoprim 800-160mg  1 tablet Oral BID     PRN Meds:  Current Facility-Administered Medications:     acetaminophen, 650 mg, Oral, Q4H PRN    bisacodyL, 10 mg, Rectal, Daily PRN    hydrALAZINE, 10 mg, Intravenous, Q4H PRN    labetalol, 10 mg, Intravenous, Q4H PRN    morphine, 2 mg,  Intravenous, Q6H PRN    naloxone, 0.4 mg, Intravenous, PRN    ondansetron, 4 mg, Intravenous, Q12H PRN    oxyCODONE, 5 mg, Oral, Q6H PRN    sodium chloride 0.9%, 10 mL, Intravenous, PRN     Review of Systems  Objective:     Weight: (!) 166.5 kg (367 lb)  Body mass index is 59.24 kg/m².  Vital Signs (Most Recent):  Temp: 97.7 °F (36.5 °C) (12/01/24 0741)  Pulse: 64 (12/01/24 0741)  Resp: 16 (12/01/24 0741)  BP: (!) 147/67 (12/01/24 0741)  SpO2: 96 % (12/01/24 0741) Vital Signs (24h Range):  Temp:  [97.5 °F (36.4 °C)-98.8 °F (37.1 °C)] 97.7 °F (36.5 °C)  Pulse:  [64-89] 64  Resp:  [16-20] 16  SpO2:  [94 %-98 %] 96 %  BP: (132-147)/(61-67) 147/67                         Female External Urinary Catheter w/ Suction 11/21/24 1400 (Active)   Skin no redness;no breakdown 12/01/24 0401   Tolerance no signs/symptoms of discomfort 12/01/24 0401   Suction Continuous suction at 40 mmHg;Continuous suction at 60 mmHg 12/01/24 0401   Date of last wick change 11/30/24 12/01/24 0401   Time of last wick change 1900 12/01/24 0401   Output (mL) 700 mL 11/30/24 1632          Physical Exam         Neurosurgery Physical Exam    General: well developed, well nourished, no distress.   Head: normocephalic  Neurologic: Alert and oriented. Thought content appropriate.  GCS: Motor: 6/Verbal: 4/Eyes: 3 GCS Total: 13  Mental Status: Awake, Alert, Oriented x 2  Language: aphasia improving  Speech: No dysarthria  Eyes: pupils equal, round, reactive to light with accomodation, EOMI.  Pulmonary: normal respirations, no signs of respiratory distress  Sensory: intact to light touch throughout  Motor Strength: Moves all extremities spontaneously with good tone. Grossly full strength upper and lower extremities. Follows commands x 4 antigravity. No abnormal movements seen.   Skin: Skin is warm, dry and intact.  Incision c/d/I.    Significant Labs:  Recent Labs   Lab 11/29/24  1001 12/01/24  0232    94   * 140   K 4.3 4.7   * 112*   CO2  "23 23   BUN 20 19   CREATININE 0.7 0.8   CALCIUM 9.8 9.6   MG  --  2.3     Recent Labs   Lab 12/01/24  0232   WBC 10.36   HGB 9.1*   HCT 30.2*        No results for input(s): "LABPT", "INR", "APTT" in the last 48 hours.  Microbiology Results (last 7 days)       ** No results found for the last 168 hours. **          All pertinent labs from the last 24 hours have been reviewed.    Significant Diagnostics:  I have reviewed all pertinent imaging results/findings within the past 24 hours.  Assessment/Plan:     * Cerebral aneurysm  Isabel Coats is a 73-year-old female past medical history of hypertension, obstructive sleep apnea on CPAP, suggestive heart failure, obesity, history of gastric cancer status post chemoradiation and DVT in currently on Eliquis. She presents to [facility] s/p L pterional crani on 11/20 with 7 clips applied in proximity to large irregular L MCA bifurcation aneurysm and other adjacent small aneurysms, 1 clip applied across neck of aneurysm just proximal to anterior choroidal.  After surgery she seemed to be gradually waking up appropriately overnight, then AM of POD1 around shift change had an unexplained decline in exam during which stat CT showed IPH in the right side of her cerebellum. She became less responsive as well as developed a L gaze preference and dense aphasia. She had a perfusion scan with questionable perfusion deficit to the left anterior temporal lobe and milrinone was started with seemingly some clinical improvement. Now on POD5 has been weaned off milrinone, gaze preference resolved, following commands x4 antigravity, able to state name but still remains aphasic.    Imaging:  CTA Head 11/20: expected post-op changes  CTH 11/21 8 AM: right sided cerebellar IPH  CTA 11/21 10 AM: stable IPH, possible frontal contusion  CTP 11/21 4 PM: possible left anterior temporal perfusion deficit in territory of branches distal to MCA clips   MRI brain w wo 11/22: no strokes, post op " changes, stable cerebellar bleed. some edema in right frontal lobe.    Plans:  -TTF 11/26  -q4h neurochecks/vitals  -SBP <160  -drain removed 11/25  -Continue to monitor respiratory status, on room air  -keppra 1g BID postop. cEEG negative during initial AMS POD1  -Cerebral vasospasm: weaned off milrinone prior to step down, TCDs 11/28 w/o evidence of vasospasm on left, unable to test right vessels. TCDs 11/29 with mild L MCA/DIANE vasospasm.  - Continue daily TCDs x 14 days.  - Keep euvolemic to net positive. Bolus PRN.   - Strict I/O. Please document I/O every 4 hours.   -PT/OT/OOB  -SLP - tolerating current diet. Continue SLP. Encourage PO fluids since NGT removed 11/28. Calorie count pending. RD following.  -Continue to monitor clinically, notify NSGY immediately with any changes in neuro status   -SQH DVT ppx    Dispo: pending placement to IPR    Abscess  Small R inner thigh abscess noted with seropurulent drainage.  Gen Surg consulted 11/29:  This is spontaneously and adequately draining. No significant fluctuance or induration.     -No surgical intervention indicated or recommended at this time as this area is draining well  -CTM. Wound Care following.  -Bactrim started 11/29, continue x 7 days    Pure hypercholesterolemia  - home lipitor 20       Chronic diastolic congestive heart failure    Latest ECHO  Results for orders placed during the hospital encounter of 10/22/24    Echo    Interpretation Summary    Left Ventricle: The left ventricle is normal in size. Normal wall thickness. There is normal systolic function with a visually estimated ejection fraction of 60 - 65%. There is normal diastolic function.    Right Ventricle: Normal right ventricular cavity size. Wall thickness is normal. Systolic function is normal.    Left Atrium: Left atrium is severely dilated.    Right Atrium: Right atrium is dilated.    Aortic Valve: The aortic valve is a trileaflet valve. There is moderate aortic valve sclerosis.  Mildly restricted motion.    Tricuspid Valve: There is mild regurgitation.    IVC/SVC: Normal venous pressure at 3 mmHg.    Current Heart Failure Medications  hydrALAZINE injection 10 mg, Every 4 hours PRN, Intravenous  labetalol 20 mg/4 mL (5 mg/mL) IV syring, Every 4 hours PRN, Intravenous  hydrALAZINE tablet 50 mg, Every 8 hours, Oral  lisinopriL tablet 40 mg, Daily, Oral    Plan  - Monitor strict I&Os and daily weights.    - On telemetry  - The patient's volume status is stable        Cole Garza MD  Neurosurgery  [facility] - Neurosurgery ([facility])

## 2024-12-01 NOTE — ASSESSMENT & PLAN NOTE
Latest ECHO  Results for orders placed during the hospital encounter of 10/22/24    Echo    Interpretation Summary    Left Ventricle: The left ventricle is normal in size. Normal wall thickness. There is normal systolic function with a visually estimated ejection fraction of 60 - 65%. There is normal diastolic function.    Right Ventricle: Normal right ventricular cavity size. Wall thickness is normal. Systolic function is normal.    Left Atrium: Left atrium is severely dilated.    Right Atrium: Right atrium is dilated.    Aortic Valve: The aortic valve is a trileaflet valve. There is moderate aortic valve sclerosis. Mildly restricted motion.    Tricuspid Valve: There is mild regurgitation.    IVC/SVC: Normal venous pressure at 3 mmHg.    Current Heart Failure Medications  hydrALAZINE injection 10 mg, Every 4 hours PRN, Intravenous  labetalol 20 mg/4 mL (5 mg/mL) IV syring, Every 4 hours PRN, Intravenous  hydrALAZINE tablet 50 mg, Every 8 hours, Oral  lisinopriL tablet 40 mg, Daily, Oral    Plan  - Monitor strict I&Os and daily weights.    - On telemetry  - The patient's volume status is stable

## 2024-12-01 NOTE — PLAN OF CARE
POC reviewed and updated with the patient at the bedside. Questions regarding POC were encouraged and addressed. VSS, see flowsheets. Tele maintained per order.  ZIA.  Patient is AOx3 at this time. Fall and safety precautions maintained, no signs of injury noted during shift. Upon exiting room, patient's bed locked in low position, side rails up x 4, bed alarm set, with call light within reach. Instructed patient to call staff for mobility, verbalized understanding.  No acute signs of distress noted at this time.     Problem: Adult Inpatient Plan of Care  Goal: Plan of Care Review  Outcome: Progressing  Goal: Patient-Specific Goal (Individualized)  Outcome: Progressing  Goal: Absence of Hospital-Acquired Illness or Injury  Outcome: Progressing  Goal: Optimal Comfort and Wellbeing  Outcome: Progressing  Goal: Readiness for Transition of Care  Outcome: Progressing     Problem: Bariatric Environmental Safety  Goal: Safety Maintained with Care  Outcome: Progressing     Problem: Wound  Goal: Optimal Coping  Outcome: Progressing  Goal: Optimal Functional Ability  Outcome: Progressing  Goal: Absence of Infection Signs and Symptoms  Outcome: Progressing  Goal: Improved Oral Intake  Outcome: Progressing  Goal: Optimal Pain Control and Function  Outcome: Progressing  Goal: Skin Health and Integrity  Outcome: Progressing  Goal: Optimal Wound Healing  Outcome: Progressing     Problem: Infection  Goal: Absence of Infection Signs and Symptoms  Outcome: Progressing     Problem: Stroke, Intracerebral Hemorrhage  Goal: Optimal Coping  Outcome: Progressing  Goal: Effective Bowel Elimination  Outcome: Progressing  Goal: Optimal Cerebral Tissue Perfusion  Outcome: Progressing  Goal: Optimal Cognitive Function  Outcome: Progressing  Goal: Effective Communication Skills  Outcome: Progressing  Goal: Optimal Functional Ability  Outcome: Progressing  Goal: Optimal Nutrition Intake  Outcome: Progressing  Goal: Optimal Pain Control and  Function  Outcome: Progressing  Goal: Effective Oxygenation and Ventilation  Outcome: Progressing  Goal: Improved Sensorimotor Function  Outcome: Progressing  Goal: Safe and Effective Swallow  Outcome: Progressing  Goal: Effective Urinary Elimination  Outcome: Progressing     Problem: Fall Injury Risk  Goal: Absence of Fall and Fall-Related Injury  Outcome: Progressing     Problem: Skin Injury Risk Increased  Goal: Skin Health and Integrity  Outcome: Progressing     Problem: Restraint, Nonviolent  Goal: Absence of Harm or Injury  Outcome: Progressing

## 2024-12-01 NOTE — SUBJECTIVE & OBJECTIVE
Interval History: 12/1: NAEO. Neuro exam stable. Denies headache, nausea or vomiting. TCD wnl. Tolerating diet but not hydrating much, I/O around -1.1L, 500cc NS bolus ordered. Planning for IPR placement.    Medications:  Continuous Infusions:  Scheduled Meds:   atorvastatin  20 mg Oral Daily    citalopram  10 mg Oral Daily    gabapentin  600 mg Oral Q8H    heparin (porcine)  7,500 Units Subcutaneous Q8H    hydrALAZINE  50 mg Oral Q8H    levetiracetam  1,000 mg Oral BID    lisinopriL  40 mg Oral Daily    sulfamethoxazole-trimethoprim 800-160mg  1 tablet Oral BID     PRN Meds:  Current Facility-Administered Medications:     acetaminophen, 650 mg, Oral, Q4H PRN    bisacodyL, 10 mg, Rectal, Daily PRN    hydrALAZINE, 10 mg, Intravenous, Q4H PRN    labetalol, 10 mg, Intravenous, Q4H PRN    morphine, 2 mg, Intravenous, Q6H PRN    naloxone, 0.4 mg, Intravenous, PRN    ondansetron, 4 mg, Intravenous, Q12H PRN    oxyCODONE, 5 mg, Oral, Q6H PRN    sodium chloride 0.9%, 10 mL, Intravenous, PRN     Review of Systems  Objective:     Weight: (!) 166.5 kg (367 lb)  Body mass index is 59.24 kg/m².  Vital Signs (Most Recent):  Temp: 97.7 °F (36.5 °C) (12/01/24 0741)  Pulse: 64 (12/01/24 0741)  Resp: 16 (12/01/24 0741)  BP: (!) 147/67 (12/01/24 0741)  SpO2: 96 % (12/01/24 0741) Vital Signs (24h Range):  Temp:  [97.5 °F (36.4 °C)-98.8 °F (37.1 °C)] 97.7 °F (36.5 °C)  Pulse:  [64-89] 64  Resp:  [16-20] 16  SpO2:  [94 %-98 %] 96 %  BP: (132-147)/(61-67) 147/67                         Female External Urinary Catheter w/ Suction 11/21/24 1400 (Active)   Skin no redness;no breakdown 12/01/24 0401   Tolerance no signs/symptoms of discomfort 12/01/24 0401   Suction Continuous suction at 40 mmHg;Continuous suction at 60 mmHg 12/01/24 0401   Date of last wick change 11/30/24 12/01/24 0401   Time of last wick change 1900 12/01/24 0401   Output (mL) 700 mL 11/30/24 1632          Physical Exam         Neurosurgery Physical Exam    General: well  "developed, well nourished, no distress.   Head: normocephalic  Neurologic: Alert and oriented. Thought content appropriate.  GCS: Motor: 6/Verbal: 4/Eyes: 3 GCS Total: 13  Mental Status: Awake, Alert, Oriented x 2  Language: aphasia improving  Speech: No dysarthria  Eyes: pupils equal, round, reactive to light with accomodation, EOMI.  Pulmonary: normal respirations, no signs of respiratory distress  Sensory: intact to light touch throughout  Motor Strength: Moves all extremities spontaneously with good tone. Grossly full strength upper and lower extremities. Follows commands x 4 antigravity. No abnormal movements seen.   Skin: Skin is warm, dry and intact.  Incision c/d/I.    Significant Labs:  Recent Labs   Lab 11/29/24  1001 12/01/24  0232    94   * 140   K 4.3 4.7   * 112*   CO2 23 23   BUN 20 19   CREATININE 0.7 0.8   CALCIUM 9.8 9.6   MG  --  2.3     Recent Labs   Lab 12/01/24  0232   WBC 10.36   HGB 9.1*   HCT 30.2*        No results for input(s): "LABPT", "INR", "APTT" in the last 48 hours.  Microbiology Results (last 7 days)       ** No results found for the last 168 hours. **          All pertinent labs from the last 24 hours have been reviewed.    Significant Diagnostics:  I have reviewed all pertinent imaging results/findings within the past 24 hours.  "

## 2024-12-02 PROCEDURE — 92507 TX SP LANG VOICE COMM INDIV: CPT | Mod: HCNC

## 2024-12-02 PROCEDURE — 11000001 HC ACUTE MED/SURG PRIVATE ROOM: Mod: HCNC

## 2024-12-02 PROCEDURE — 25000003 PHARM REV CODE 250: Mod: HCNC | Performed by: PHYSICIAN ASSISTANT

## 2024-12-02 PROCEDURE — 25000003 PHARM REV CODE 250: Mod: HCNC | Performed by: STUDENT IN AN ORGANIZED HEALTH CARE EDUCATION/TRAINING PROGRAM

## 2024-12-02 PROCEDURE — 97530 THERAPEUTIC ACTIVITIES: CPT | Mod: HCNC,CQ

## 2024-12-02 PROCEDURE — 92526 ORAL FUNCTION THERAPY: CPT | Mod: HCNC

## 2024-12-02 PROCEDURE — 63600175 PHARM REV CODE 636 W HCPCS: Mod: HCNC | Performed by: NURSE PRACTITIONER

## 2024-12-02 PROCEDURE — 97535 SELF CARE MNGMENT TRAINING: CPT | Mod: HCNC

## 2024-12-02 RX ADMIN — HYDRALAZINE HYDROCHLORIDE 50 MG: 25 TABLET ORAL at 09:12

## 2024-12-02 RX ADMIN — GABAPENTIN 600 MG: 250 SOLUTION ORAL at 09:12

## 2024-12-02 RX ADMIN — HEPARIN SODIUM 7500 UNITS: 5000 INJECTION INTRAVENOUS; SUBCUTANEOUS at 06:12

## 2024-12-02 RX ADMIN — SULFAMETHOXAZOLE AND TRIMETHOPRIM 1 TABLET: 800; 160 TABLET ORAL at 09:12

## 2024-12-02 RX ADMIN — GABAPENTIN 600 MG: 250 SOLUTION ORAL at 02:12

## 2024-12-02 RX ADMIN — LEVETIRACETAM 1000 MG: 500 SOLUTION ORAL at 09:12

## 2024-12-02 RX ADMIN — HEPARIN SODIUM 7500 UNITS: 5000 INJECTION INTRAVENOUS; SUBCUTANEOUS at 02:12

## 2024-12-02 RX ADMIN — HEPARIN SODIUM 7500 UNITS: 5000 INJECTION INTRAVENOUS; SUBCUTANEOUS at 09:12

## 2024-12-02 RX ADMIN — HYDRALAZINE HYDROCHLORIDE 50 MG: 25 TABLET ORAL at 06:12

## 2024-12-02 RX ADMIN — HYDRALAZINE HYDROCHLORIDE 50 MG: 25 TABLET ORAL at 02:12

## 2024-12-02 RX ADMIN — LEVETIRACETAM 1000 MG: 500 SOLUTION ORAL at 08:12

## 2024-12-02 RX ADMIN — LISINOPRIL 40 MG: 20 TABLET ORAL at 08:12

## 2024-12-02 RX ADMIN — CITALOPRAM HYDROBROMIDE 10 MG: 10 TABLET ORAL at 08:12

## 2024-12-02 RX ADMIN — SULFAMETHOXAZOLE AND TRIMETHOPRIM 1 TABLET: 800; 160 TABLET ORAL at 08:12

## 2024-12-02 RX ADMIN — GABAPENTIN 600 MG: 250 SOLUTION ORAL at 06:12

## 2024-12-02 RX ADMIN — SODIUM CHLORIDE 250 ML: 9 INJECTION, SOLUTION INTRAVENOUS at 05:12

## 2024-12-02 RX ADMIN — ATORVASTATIN CALCIUM 20 MG: 20 TABLET, FILM COATED ORAL at 08:12

## 2024-12-02 NOTE — SUBJECTIVE & OBJECTIVE
Interval History: 12/2: NAEO. I/O with -190cc, given 250cc bolus. TCD yesterday with mild vasospasm, reviewed by Dr. Simon and thought to be overall stable. Today's TCD pending. Diet advanced by SLP to soft and bite sized. Exam stable.     Medications:  Continuous Infusions:  Scheduled Meds:   atorvastatin  20 mg Oral Daily    citalopram  10 mg Oral Daily    gabapentin  600 mg Oral Q8H    heparin (porcine)  7,500 Units Subcutaneous Q8H    hydrALAZINE  50 mg Oral Q8H    levetiracetam  1,000 mg Oral BID    lisinopriL  40 mg Oral Daily    sodium chloride 0.9%  250 mL Intravenous Once    sulfamethoxazole-trimethoprim 800-160mg  1 tablet Oral BID     PRN Meds:  Current Facility-Administered Medications:     acetaminophen, 650 mg, Oral, Q4H PRN    bisacodyL, 10 mg, Rectal, Daily PRN    hydrALAZINE, 10 mg, Intravenous, Q4H PRN    labetalol, 10 mg, Intravenous, Q4H PRN    morphine, 2 mg, Intravenous, Q6H PRN    naloxone, 0.4 mg, Intravenous, PRN    ondansetron, 4 mg, Intravenous, Q12H PRN    oxyCODONE, 5 mg, Oral, Q6H PRN    sodium chloride 0.9%, 10 mL, Intravenous, PRN     Objective:     Weight: (!) 144.2 kg (318 lb)  Body mass index is 51.33 kg/m².  Vital Signs (Most Recent):  Temp: 98.6 °F (37 °C) (12/02/24 1456)  Pulse: 73 (12/02/24 1456)  Resp: 16 (12/02/24 1456)  BP: (!) 116/56 (12/02/24 1456)  SpO2: 95 % (12/02/24 1456) Vital Signs (24h Range):  Temp:  [97.7 °F (36.5 °C)-98.8 °F (37.1 °C)] 98.6 °F (37 °C)  Pulse:  [70-96] 73  Resp:  [16-18] 16  SpO2:  [93 %-98 %] 95 %  BP: (106-128)/(51-58) 116/56                         Female External Urinary Catheter w/ Suction 11/21/24 1400 (Active)   Skin no redness;no breakdown 12/02/24 0800   Tolerance no signs/symptoms of discomfort 12/02/24 0800   Suction Continuous suction at 70 mmHg 12/02/24 0800   Date of last wick change 12/01/24 12/02/24 0800   Time of last wick change 0800 12/02/24 0800   Output (mL) 300 mL 12/02/24 0455     Neurosurgery Physical Exam  General: well  "developed, well nourished, no distress.   Head: normocephalic, atraumatic  Mental Status: Awake, Alert, Oriented x 2  Speech: Clear with content appropriate to conversation, mild aphasia  Cranial nerves: face symmetric, CN II-XII grossly intact.   Eyes: pupils equal, round, reactive to light, EOMI.  Sensory: intact to light touch throughout    Motor Strength: Moves all extremities spontaneously with good tone.  Full strength upper and lower extremities. No abnormal movements seen.     Follows commands x 4 extremities    Skin: Skin is warm, dry and intact.  Incision c/d/I.    Significant Labs:  Recent Labs   Lab 12/01/24  0232   GLU 94      K 4.7   *   CO2 23   BUN 19   CREATININE 0.8   CALCIUM 9.6   MG 2.3     Recent Labs   Lab 12/01/24  0232   WBC 10.36   HGB 9.1*   HCT 30.2*        No results for input(s): "LABPT", "INR", "APTT" in the last 48 hours.  Microbiology Results (last 7 days)       ** No results found for the last 168 hours. **          All pertinent labs from the last 24 hours have been reviewed.    Significant Diagnostics:  I have reviewed and interpreted all pertinent imaging results/findings within the past 24 hours.  "

## 2024-12-02 NOTE — PT/OT/SLP PROGRESS
Occupational Therapy   Treatment    Name: Ca Coats  MRN: [id number]  Admitting Diagnosis:  Cerebral aneurysm  12 Days Post-Op    Recommendations:     Discharge Recommendations: Moderate Intensity Therapy  Discharge Equipment Recommendations:  hospital bed, lift device, wheelchair  Barriers to discharge:   (increased (A) required)    Assessment:     Ca Coats is a 73 y.o. female with a medical diagnosis of Cerebral aneurysm.  She presents with fair participation and motivation.  Pt continues to require (A) for safety & is at risk for falls during transfers. Performance deficits affecting function are weakness, impaired endurance, impaired self care skills, impaired functional mobility, impaired balance, decreased safety awareness, decreased lower extremity function, decreased upper extremity function, decreased coordination, impaired cognition.     Rehab Prognosis:  Fair; patient would benefit from acute skilled OT services to address these deficits and reach maximum level of function.       Plan:     Patient to be seen 4 x/week to address the above listed problems via self-care/home management, therapeutic activities, therapeutic exercises, neuromuscular re-education, cognitive retraining  Plan of Care Expires: 12/24/24  Plan of Care Reviewed with: patient    Subjective     Chief Complaint: not wanting to stand  Patient/Family Comments/goals: Pt reported that she was hungry during session (noted to have lunch in front of her at start of session with part of food eaten).  Pain/Comfort:  Pain Rating 1: 0/10  Pain Rating Post-Intervention 1: 0/10    Objective:     Communicated with: RN prior to session.  Patient found supine with telemetry, PureWick (family arrived during session) upon OT entry to room.    General Precautions: Standard, fall, aspiration, seizure    Orthopedic Precautions:N/A  Braces: N/A     Occupational Performance:     Bed Mobility:    Patient completed Supine to Sit with  moderate assistance  Patient completed Sit to Supine with moderate assistance   Min (A) with scooting forward on EOB    Functional Mobility/Transfers:  Pt declined standing & scooting along EOB.    Activities of Daily Living:  Feeding:  minimum assistance with feeding self while seated EOB with cues to not bring plate to mouth & to correctly utilize utensil to eat throughout meal      [facility] 6 Click ADL: 14    Treatment & Education:  SBA with postural control while seated EOB. Pt performed shoulder flexion with AAROM x 10 reps with BUE while seated EOB. Pt with noted confusion during session requiring repetition of directions at times. Pt's daughter with several questions regarding differences between OT & PT as well as pt progress/performance therefore provided education within OT scope of practice to pt & pt's daughter.  Pt had no further questions & when asked whether there were any concerns pt reported none.       Patient left supine with all lines intact, call button in reach, bed alarm on, RN notified, and 3 visitors present    GOALS:   Multidisciplinary Problems       Occupational Therapy Goals          Problem: Occupational Therapy    Goal Priority Disciplines Outcome Interventions   Occupational Therapy Goal     OT, PT/OT Progressing    Description: Goals to be met by: 12/24/24     Patient will increase functional independence with ADLs by performing:    UE Dressing with Stand-by Assistance.  LE Dressing with Minimal Assistance.  Grooming while seated with Stand-by Assistance.  Toileting from toilet/bedside commode with Minimal Assistance for hygiene and clothing management.   Supine to sit with Minimal Assistance.  Toilet transfer to toilet/bedside commode with Minimal Assistance.  Eating with Modified independence    Patient requires a hospital bed for positioning of the body in ways that are not feasible with an ordinary bed. The patient requires special positioning for pain relief, limited mobility,  and/or being unable to independently make changes in body position without the use of a hospital bed. Pillows and wedges will not be adequate for resolving these positional issues.      Patient has a mobility limitation that significantly impairs their ability to participate in one or more mobility related activities of daily living in customary locations in the home. The mobility limitation cannot be sufficiently resolved by the use of a cane or walker. The use of a manual wheelchair will greatly improve the patient's ability to participate in MRADLs. The patient will use the wheelchair on a regular basis at home. They have expressed their willingness to use a manual wheelchair in the home, and have a caregiver who is available and willing to assist with the wheelchair if needed.                            Time Tracking:     OT Date of Treatment: 12/02/24  OT Start Time: 1354  OT Stop Time: 1414  OT Total Time (min): 20 min    Billable Minutes:Self Care/Home Management 20    OT/RICHARD: OT     Number of RICHARD visits since last OT visit: 1    12/2/2024

## 2024-12-02 NOTE — PLAN OF CARE
Problem: Occupational Therapy  Goal: Occupational Therapy Goal  Description: Goals to be met by: 12/24/24     Patient will increase functional independence with ADLs by performing:    UE Dressing with Stand-by Assistance.  LE Dressing with Minimal Assistance.  Grooming while seated with Stand-by Assistance.  Toileting from toilet/bedside commode with Minimal Assistance for hygiene and clothing management.   Supine to sit with Minimal Assistance.  Toilet transfer to toilet/bedside commode with Minimal Assistance.  Eating with Modified independence    Patient requires a hospital bed for positioning of the body in ways that are not feasible with an ordinary bed. The patient requires special positioning for pain relief, limited mobility, and/or being unable to independently make changes in body position without the use of a hospital bed. Pillows and wedges will not be adequate for resolving these positional issues.      Patient has a mobility limitation that significantly impairs their ability to participate in one or more mobility related activities of daily living in customary locations in the home. The mobility limitation cannot be sufficiently resolved by the use of a cane or walker. The use of a manual wheelchair will greatly improve the patient's ability to participate in MRADLs. The patient will use the wheelchair on a regular basis at home. They have expressed their willingness to use a manual wheelchair in the home, and have a caregiver who is available and willing to assist with the wheelchair if needed.       Outcome: Progressing     Goals remain appropriate

## 2024-12-02 NOTE — PT/OT/SLP PROGRESS
"Speech Language Pathology Treatment    Patient Name:  Ca Coats   MRN:  [id number]  [id number]/[id number] A    Admitting Diagnosis: Cerebral aneurysm    Recommendations:                 General Recommendations:  Dysphagia therapy, Speech/language therapy, and Cognitive-linguistic therapy  Diet recommendations:  Soft & Bite Sized Diet - IDDSI Level 6, Liquid Diet Level: Thin liquids - IDDSI Level 0   Aspiration Precautions: 1 bite/sip at a time, Alternating bites/sips, Assistance with meals, Eliminate distractions, Feed only when awake/alert, HOB to 90 degrees, Meds whole 1 at a time, and Small bites/sips   General Precautions: Standard, aspiration, aphasia, fall  Communication strategies:  provide increased time to answer    Assessment:     Ca Coats is a 73 y.o. female with an SLP diagnosis of Aphasia, Dysphagia, and Cognitive-Linguistic Impairment.     Subjective     Patient awake and cooperative.   RN present at bedside to administer po medications.       Objective:     Has the patient been evaluated by SLP for swallowing?   Yes  Keep patient NPO? No     Patient seen for ongoing speech, language, and cognitive therapy and an ongoing swallowing assessment. Patient assessed with bites of pureed breakfast tray, bites of cereal softened in milk, whole po medications administered by RN, and sips of juice and milk via straw. She presents with adequate oral clearance and no overt s/s of aspiration with all trials. Patient oriented to self, place, and time. She answered simple y/n questions with 100% acc. She answered complex y/n questions with <50% acc. She required multiple repetitions and cues to follow simple 1 step directions. Patient often responding to questions with "I don't know." She reported lethargy this AM and was noted to intermittently fall asleep despite cues. SLP provided education on diet upgrade, SLP recommendations, SLP role, s/s and risks of aspiration, safe swallow precautions, and POC. " Patient demonstrated understanding. ST will continue to follow.     Goals:   Multidisciplinary Problems       SLP Goals          Problem: SLP    Goal Priority Disciplines Outcome   SLP Goal     SLP Progressing   Description: Speech Language Pathology Goals  Goals expected to be met by 12/5:  1) Pt will participate in ongoing assessment of swallow function to determine least restrictive diet.  2) Pt will participate in ongoing speech, language, cognitive evaluation to determine possible goals and poc.   3) Pt will respond to simple yes/no questions with 80% accy given min cues.  4) Pt will complete automatic speech tasks with 90% accy given min cues.  5) Pt will complete naming tasks with 80% accy given mod cues.  6) Pt will follow simple commands with 90% accy given mod cues.                                 Plan:     Patient to be seen:  4 x/week   Plan of Care expires:  12/22/24  Plan of Care reviewed with:  patient   SLP Follow-Up:  Yes       Discharge recommendations:  High Intensity Therapy   Barriers to Discharge:  None    Time Tracking:     SLP Treatment Date:   12/02/24  Speech Start Time:  0840  Speech Stop Time:  0900     Speech Total Time (min):  20 min    Billable Minutes: Speech Therapy Individual 10 and Treatment Swallowing Dysfunction 10    12/02/2024

## 2024-12-02 NOTE — PLAN OF CARE
Problem: Adult Inpatient Plan of Care  Goal: Plan of Care Review  Outcome: Progressing  Goal: Patient-Specific Goal (Individualized)  Outcome: Progressing  Goal: Optimal Comfort and Wellbeing  Outcome: Progressing     Problem: Wound  Goal: Improved Oral Intake  Outcome: Progressing  Goal: Skin Health and Integrity  Outcome: Progressing

## 2024-12-02 NOTE — PT/OT/SLP PROGRESS
"Physical Therapy Treatment    Patient Name:  Ca Coats   MRN:  [id number]    Recommendations:     Discharge Recommendations: High Intensity Therapy  Discharge Equipment Recommendations: hospital bed, lift device, wheelchair  Barriers to discharge: None    Assessment:     Ca Coats is a 73 y.o. female admitted with a medical diagnosis of Cerebral aneurysm.  She presents with the following impairments/functional limitations: weakness, impaired endurance, impaired self care skills, impaired functional mobility, gait instability, impaired balance, impaired cognition, decreased upper extremity function, decreased lower extremity function. Pt agreeable for therapy, focused on trunk stability, attempted to stand but pt strongly denies further attempts at this time and returns self to supine.    Rehab Prognosis: Fair; patient would benefit from acute skilled PT services to address these deficits and reach maximum level of function.    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  Patient found supine with telemetry, PureWick, pressure relief boots upon PT entry to room.     General Precautions: Standard, aspiration, aphasia  Orthopedic Precautions: N/A  Braces: N/A  Respiratory Status: Room " air    Functional Mobility:    Bed Mobility:   Rolling: to R side with moderate assistance  Supine > Sit: moderate assistance  Sit > Supine: moderate assistance    Balance:   Sitting balance: FAIR+: Maintains balance through MINIMAL excursions of active trunk motion; pt performs seated therex EOB, requires mod A to scoot    Pt assists w/repositioning further up in bed using BLE and therapist assisting in scooting up         AM-PAC 6 CLICK MOBILITY  Turning over in bed (including adjusting bedclothes, sheets and blankets)?: 2  Sitting down on and standing up from a chair with arms (e.g., wheelchair, bedside commode, etc.): 2  Moving from lying on back to sitting on the side of the bed?: 2  Moving to and from a bed to a chair (including a wheelchair)?: 1  Need to walk in hospital room?: 1  Climbing 3-5 steps with a railing?: 1  Basic Mobility Total Score: 9       Treatment & Education:  Education:  PT role and PoC to increase strength and personal mobility  Benefits of therex to increase strength and activity tolerance    Patient left supine with all lines intact and call button in reach..    GOALS:   Multidisciplinary Problems       Physical Therapy Goals          Problem: Physical Therapy    Goal Priority Disciplines Outcome Interventions   Physical Therapy Goal     PT, PT/OT Progressing    Description: Goals to be met by: 2024     Patient will increase functional independence with mobility by performin. Supine to sit with MInimal Assistance  2. Sit to supine with Minimal Assistance  3. Sit to stand transfer with Moderate Assistance  4. Bed to chair transfer with Maximum Assistance using LRAD  5. Gait  x 5 feet with Maximum Assistance using LRAD.   6. Sitting at edge of bed x5 minutes with Stand-by Assistance  7. Lower extremity exercise program x15 reps per handout, with assistance as needed    Hospital Bed - Patient requires a hospital bed for positioning of the body in ways that are not feasible with  an ordinary bed. The patient requires special positioning for pain relief, limited mobility, and/or being unable to independently make changes in body position without the use of a hospital bed. Pillows and wedges will not be adequate for resolving these positional issues.     Wheelchair - Patient has a mobility limitation that significantly impairs their ability to participate in one or more mobility related activities of daily living in customary locations in the home. The mobility limitation cannot be sufficiently resolved by the use of a cane or walker. The use of a manual wheelchair will greatly improve the patient's ability to participate in MRADLs. The patient will use the wheelchair on a regular basis at home. They have expressed their willingness to use a manual wheelchair in the home, and have a caregiver who is available and willing to assist with the wheelchair if needed                         Time Tracking:     PT Received On: 12/02/24  PT Start Time: 1302     PT Stop Time: 1329  PT Total Time (min): 27 min     Billable Minutes: Therapeutic Activity 27min    Treatment Type: Treatment  PT/PTA: PTA     Number of PTA visits since last PT visit: 3     12/02/2024

## 2024-12-02 NOTE — PROGRESS NOTES
Dario Glover - Neurosurgery ([facility])  Neurosurgery  Progress Note    Subjective:     History of Present Illness: Isabel Coats is a 73-year-old female past medical history of hypertension, obstructive sleep apnea on CPAP, suggestive heart failure, obesity, history of gastric cancer status post chemoradiation and DVT in currently on Eliquis. She presents to [facility] L pterional crani, 7 clips applied in proximity to large irregular L MCA bifurcation aneurysm and other adjacent small aneurysms, 1 clip applied across neck of aneurysm just proximal to anterior choroidal.  She also has a current smoker she was initially referred to Neurology for workup for dizziness. She had been complaining of vertigo multiple times a day for the past several months, associated with shortness of breath, palpitation, nausea, headache, and feeling faint. She is admitted to [facility] for a higher level of care.     Post-Op Info:  Procedure(s) (LRB):  CRANIOTOMY, WITH ANEURYSM CLIPPING W/ STEALTH (Left)  CRANIOTOMY, FOR ANEURYSM OF VERTEBROBASILAR OR CAROTID CIRCULATION (Left)  DISSECTION, NERVE, USING OPERATING MICROSCOPE (Left)   12 Days Post-Op   Interval History: 12/2: NAEO. I/O with -190cc, given 250cc bolus. TCD yesterday with mild vasospasm, reviewed by Dr. Simon and thought to be overall stable. Today's TCD pending. Diet advanced by SLP to soft and bite sized. Exam stable.     Medications:  Continuous Infusions:  Scheduled Meds:   atorvastatin  20 mg Oral Daily    citalopram  10 mg Oral Daily    gabapentin  600 mg Oral Q8H    heparin (porcine)  7,500 Units Subcutaneous Q8H    hydrALAZINE  50 mg Oral Q8H    levetiracetam  1,000 mg Oral BID    lisinopriL  40 mg Oral Daily    sodium chloride 0.9%  250 mL Intravenous Once    sulfamethoxazole-trimethoprim 800-160mg  1 tablet Oral BID     PRN Meds:  Current Facility-Administered Medications:     acetaminophen, 650 mg, Oral, Q4H PRN    bisacodyL, 10 mg, Rectal, Daily PRN    hydrALAZINE, 10 mg,  Intravenous, Q4H PRN    labetalol, 10 mg, Intravenous, Q4H PRN    morphine, 2 mg, Intravenous, Q6H PRN    naloxone, 0.4 mg, Intravenous, PRN    ondansetron, 4 mg, Intravenous, Q12H PRN    oxyCODONE, 5 mg, Oral, Q6H PRN    sodium chloride 0.9%, 10 mL, Intravenous, PRN     Objective:     Weight: (!) 144.2 kg (318 lb)  Body mass index is 51.33 kg/m².  Vital Signs (Most Recent):  Temp: 98.6 °F (37 °C) (12/02/24 1456)  Pulse: 73 (12/02/24 1456)  Resp: 16 (12/02/24 1456)  BP: (!) 116/56 (12/02/24 1456)  SpO2: 95 % (12/02/24 1456) Vital Signs (24h Range):  Temp:  [97.7 °F (36.5 °C)-98.8 °F (37.1 °C)] 98.6 °F (37 °C)  Pulse:  [70-96] 73  Resp:  [16-18] 16  SpO2:  [93 %-98 %] 95 %  BP: (106-128)/(51-58) 116/56                         Female External Urinary Catheter w/ Suction 11/21/24 1400 (Active)   Skin no redness;no breakdown 12/02/24 0800   Tolerance no signs/symptoms of discomfort 12/02/24 0800   Suction Continuous suction at 70 mmHg 12/02/24 0800   Date of last wick change 12/01/24 12/02/24 0800   Time of last wick change 0800 12/02/24 0800   Output (mL) 300 mL 12/02/24 0455     Neurosurgery Physical Exam  General: well developed, well nourished, no distress.   Head: normocephalic, atraumatic  Mental Status: Awake, Alert, Oriented x 2  Speech: Clear with content appropriate to conversation, mild aphasia  Cranial nerves: face symmetric, CN II-XII grossly intact.   Eyes: pupils equal, round, reactive to light, EOMI.  Sensory: intact to light touch throughout    Motor Strength: Moves all extremities spontaneously with good tone.  Full strength upper and lower extremities. No abnormal movements seen.     Follows commands x 4 extremities    Skin: Skin is warm, dry and intact.  Incision c/d/I.    Significant Labs:  Recent Labs   Lab 12/01/24  0232   GLU 94      K 4.7   *   CO2 23   BUN 19   CREATININE 0.8   CALCIUM 9.6   MG 2.3     Recent Labs   Lab 12/01/24 0232   WBC 10.36   HGB 9.1*   HCT 30.2*     "    No results for input(s): "LABPT", "INR", "APTT" in the last 48 hours.  Microbiology Results (last 7 days)       ** No results found for the last 168 hours. **          All pertinent labs from the last 24 hours have been reviewed.    Significant Diagnostics:  I have reviewed and interpreted all pertinent imaging results/findings within the past 24 hours.  Assessment/Plan:     * Cerebral aneurysm  Isabel Coats is a 73-year-old female past medical history of hypertension, obstructive sleep apnea on CPAP, suggestive heart failure, obesity, history of gastric cancer status post chemoradiation and DVT in currently on Eliquis. She presents to [facility] s/p L pterional crani on 11/20 with 7 clips applied in proximity to large irregular L MCA bifurcation aneurysm and other adjacent small aneurysms, 1 clip applied across neck of aneurysm just proximal to anterior choroidal.  After surgery she seemed to be gradually waking up appropriately overnight, then AM of POD1 around shift change had an unexplained decline in exam during which stat CT showed IPH in the right side of her cerebellum. She became less responsive as well as developed a L gaze preference and dense aphasia. She had a perfusion scan with questionable perfusion deficit to the left anterior temporal lobe and milrinone was started with seemingly some clinical improvement. Now on POD5 has been weaned off milrinone, gaze preference resolved, following commands x4 antigravity, able to state name but still remains aphasic.    Imaging:  CTA Head 11/20: expected post-op changes  CTH 11/21 8 AM: right sided cerebellar IPH  CTA 11/21 10 AM: stable IPH, possible frontal contusion  CTP 11/21 4 PM: possible left anterior temporal perfusion deficit in territory of branches distal to MCA clips   MRI brain w wo 11/22: no strokes, post op changes, stable cerebellar bleed. some edema in right frontal lobe.    Plans:  -Downgraded to NPU under NSGY  -q4h neurochecks/vitals  -SBP " <160  -drain removed 11/25  -keppra 1g BID postop. cEEG negative during initial AMS POD1  -Cerebral vasospasm: weaned off milrinone prior to step down  - Continue daily TCDs x 14 days.  - Keep euvolemic to net positive. Bolus PRN.   - Strict I/O. Please document I/O every 4 hours.   -PT/OT/OOB  -SLP - tolerating current diet. Continue SLP. Encourage PO fluids since NGT removed 11/28. Diet advanced to soft and bite sized 12/2  -Continue to monitor clinically, notify NSGY immediately with any changes in neuro status   -SQH for DVT ppx    Dispo: pending placement to [facility]    Case and plan discussed with attending neurosurgeon Dr. Simon     Abscess  Small R inner thigh abscess noted with seropurulent drainage.  Gen Surg consulted 11/29:  This is spontaneously and adequately draining. No significant fluctuance or induration.     -No surgical intervention indicated or recommended at this time as this area is draining well  -CTM. Wound Care following.  -Bactrim started 11/29, continue x 7 days    Pure hypercholesterolemia  - home lipitor 20       Chronic diastolic congestive heart failure    Latest ECHO  Results for orders placed during the hospital encounter of 10/22/24    Echo    Interpretation Summary    Left Ventricle: The left ventricle is normal in size. Normal wall thickness. There is normal systolic function with a visually estimated ejection fraction of 60 - 65%. There is normal diastolic function.    Right Ventricle: Normal right ventricular cavity size. Wall thickness is normal. Systolic function is normal.    Left Atrium: Left atrium is severely dilated.    Right Atrium: Right atrium is dilated.    Aortic Valve: The aortic valve is a trileaflet valve. There is moderate aortic valve sclerosis. Mildly restricted motion.    Tricuspid Valve: There is mild regurgitation.    IVC/SVC: Normal venous pressure at 3 mmHg.    Current Heart Failure Medications  hydrALAZINE injection 10 mg, Every 4 hours PRN,  Intravenous  labetalol 20 mg/4 mL (5 mg/mL) IV syring, Every 4 hours PRN, Intravenous  hydrALAZINE tablet 50 mg, Every 8 hours, Oral  lisinopriL tablet 40 mg, Daily, Oral    Plan  - Monitor strict I&Os and daily weights.    - On telemetry  - The patient's volume status is stable        Natasha Rivera PA-C  Neurosurgery  Dario Glover - Neurosurgery ([facility])

## 2024-12-02 NOTE — ASSESSMENT & PLAN NOTE
Isabel Coats is a 73-year-old female past medical history of hypertension, obstructive sleep apnea on CPAP, suggestive heart failure, obesity, history of gastric cancer status post chemoradiation and DVT in currently on Eliquis. She presents to [facility] s/p L pterional crani on 11/20 with 7 clips applied in proximity to large irregular L MCA bifurcation aneurysm and other adjacent small aneurysms, 1 clip applied across neck of aneurysm just proximal to anterior choroidal.  After surgery she seemed to be gradually waking up appropriately overnight, then AM of POD1 around shift change had an unexplained decline in exam during which stat CT showed IPH in the right side of her cerebellum. She became less responsive as well as developed a L gaze preference and dense aphasia. She had a perfusion scan with questionable perfusion deficit to the left anterior temporal lobe and milrinone was started with seemingly some clinical improvement. Now on POD5 has been weaned off milrinone, gaze preference resolved, following commands x4 antigravity, able to state name but still remains aphasic.    Imaging:  CTA Head 11/20: expected post-op changes  CTH 11/21 8 AM: right sided cerebellar IPH  CTA 11/21 10 AM: stable IPH, possible frontal contusion  CTP 11/21 4 PM: possible left anterior temporal perfusion deficit in territory of branches distal to MCA clips   MRI brain w wo 11/22: no strokes, post op changes, stable cerebellar bleed. some edema in right frontal lobe.    Plans:  -Downgraded to NPU under NSGY  -q4h neurochecks/vitals  -SBP <160  -drain removed 11/25  -keppra 1g BID postop. cEEG negative during initial AMS POD1  -Cerebral vasospasm: weaned off milrinone prior to step down  - Continue daily TCDs x 14 days.  - Keep euvolemic to net positive. Bolus PRN.   - Strict I/O. Please document I/O every 4 hours.   -PT/OT/OOB  -SLP - tolerating current diet. Continue SLP. Encourage PO fluids since NGT removed 11/28. Diet advanced to  soft and bite sized 12/2  -Continue to monitor clinically, notify NSGY immediately with any changes in neuro status   -SQH for DVT ppx    Dispo: pending placement to [facility]    Case and plan discussed with attending neurosurgeon Dr. Simon

## 2024-12-03 PROBLEM — B37.0 ORAL THRUSH: Status: ACTIVE | Noted: 2024-12-03

## 2024-12-03 LAB
ALBUMIN SERPL BCP-MCNC: 2.9 G/DL (ref 3.5–5.2)
ALP SERPL-CCNC: 93 U/L (ref 40–150)
ALT SERPL W/O P-5'-P-CCNC: 15 U/L (ref 10–44)
ANION GAP SERPL CALC-SCNC: 5 MMOL/L (ref 8–16)
AST SERPL-CCNC: 16 U/L (ref 10–40)
BASOPHILS # BLD AUTO: 0.05 K/UL (ref 0–0.2)
BASOPHILS NFR BLD: 0.5 % (ref 0–1.9)
BILIRUB SERPL-MCNC: 0.5 MG/DL (ref 0.1–1)
BUN SERPL-MCNC: 23 MG/DL (ref 8–23)
CALCIUM SERPL-MCNC: 10.1 MG/DL (ref 8.7–10.5)
CHLORIDE SERPL-SCNC: 113 MMOL/L (ref 95–110)
CO2 SERPL-SCNC: 21 MMOL/L (ref 23–29)
CREAT SERPL-MCNC: 0.9 MG/DL (ref 0.5–1.4)
DIFFERENTIAL METHOD BLD: ABNORMAL
EOSINOPHIL # BLD AUTO: 0.1 K/UL (ref 0–0.5)
EOSINOPHIL NFR BLD: 1.3 % (ref 0–8)
ERYTHROCYTE [DISTWIDTH] IN BLOOD BY AUTOMATED COUNT: 14.2 % (ref 11.5–14.5)
EST. GFR  (NO RACE VARIABLE): >60 ML/MIN/1.73 M^2
GLUCOSE SERPL-MCNC: 96 MG/DL (ref 70–110)
HCT VFR BLD AUTO: 30 % (ref 37–48.5)
HGB BLD-MCNC: 9.3 G/DL (ref 12–16)
IMM GRANULOCYTES # BLD AUTO: 0.04 K/UL (ref 0–0.04)
IMM GRANULOCYTES NFR BLD AUTO: 0.4 % (ref 0–0.5)
LYMPHOCYTES # BLD AUTO: 2.1 K/UL (ref 1–4.8)
LYMPHOCYTES NFR BLD: 21.1 % (ref 18–48)
MAGNESIUM SERPL-MCNC: 2.3 MG/DL (ref 1.6–2.6)
MCH RBC QN AUTO: 32.2 PG (ref 27–31)
MCHC RBC AUTO-ENTMCNC: 31 G/DL (ref 32–36)
MCV RBC AUTO: 104 FL (ref 82–98)
MONOCYTES # BLD AUTO: 1.1 K/UL (ref 0.3–1)
MONOCYTES NFR BLD: 11.5 % (ref 4–15)
NEUTROPHILS # BLD AUTO: 6.4 K/UL (ref 1.8–7.7)
NEUTROPHILS NFR BLD: 65.2 % (ref 38–73)
NRBC BLD-RTO: 0 /100 WBC
PHOSPHATE SERPL-MCNC: 2.7 MG/DL (ref 2.7–4.5)
PLATELET # BLD AUTO: 326 K/UL (ref 150–450)
PMV BLD AUTO: 11.4 FL (ref 9.2–12.9)
POTASSIUM SERPL-SCNC: 4.5 MMOL/L (ref 3.5–5.1)
PROT SERPL-MCNC: 6.3 G/DL (ref 6–8.4)
RBC # BLD AUTO: 2.89 M/UL (ref 4–5.4)
SODIUM SERPL-SCNC: 139 MMOL/L (ref 136–145)
WBC # BLD AUTO: 9.89 K/UL (ref 3.9–12.7)

## 2024-12-03 PROCEDURE — 84100 ASSAY OF PHOSPHORUS: CPT | Mod: HCNC | Performed by: PHYSICIAN ASSISTANT

## 2024-12-03 PROCEDURE — 85025 COMPLETE CBC W/AUTO DIFF WBC: CPT | Mod: HCNC | Performed by: PHYSICIAN ASSISTANT

## 2024-12-03 PROCEDURE — 63600175 PHARM REV CODE 636 W HCPCS: Mod: HCNC | Performed by: NURSE PRACTITIONER

## 2024-12-03 PROCEDURE — 92526 ORAL FUNCTION THERAPY: CPT | Mod: HCNC

## 2024-12-03 PROCEDURE — 94799 UNLISTED PULMONARY SVC/PX: CPT | Mod: HCNC

## 2024-12-03 PROCEDURE — 99900035 HC TECH TIME PER 15 MIN (STAT): Mod: HCNC

## 2024-12-03 PROCEDURE — 83735 ASSAY OF MAGNESIUM: CPT | Mod: HCNC | Performed by: PHYSICIAN ASSISTANT

## 2024-12-03 PROCEDURE — 80053 COMPREHEN METABOLIC PANEL: CPT | Mod: HCNC | Performed by: PHYSICIAN ASSISTANT

## 2024-12-03 PROCEDURE — 99024 POSTOP FOLLOW-UP VISIT: CPT | Mod: HCNC,,, | Performed by: PHYSICIAN ASSISTANT

## 2024-12-03 PROCEDURE — 92507 TX SP LANG VOICE COMM INDIV: CPT | Mod: HCNC

## 2024-12-03 PROCEDURE — 94761 N-INVAS EAR/PLS OXIMETRY MLT: CPT | Mod: HCNC

## 2024-12-03 PROCEDURE — 11000001 HC ACUTE MED/SURG PRIVATE ROOM: Mod: HCNC

## 2024-12-03 PROCEDURE — 25000003 PHARM REV CODE 250: Mod: HCNC | Performed by: PHYSICIAN ASSISTANT

## 2024-12-03 PROCEDURE — 36415 COLL VENOUS BLD VENIPUNCTURE: CPT | Mod: HCNC | Performed by: PHYSICIAN ASSISTANT

## 2024-12-03 RX ADMIN — LISINOPRIL 40 MG: 20 TABLET ORAL at 08:12

## 2024-12-03 RX ADMIN — HYDRALAZINE HYDROCHLORIDE 50 MG: 25 TABLET ORAL at 05:12

## 2024-12-03 RX ADMIN — GABAPENTIN 600 MG: 250 SOLUTION ORAL at 05:12

## 2024-12-03 RX ADMIN — SODIUM CHLORIDE 500 ML: 9 INJECTION, SOLUTION INTRAVENOUS at 09:12

## 2024-12-03 RX ADMIN — ALUMINUM HYDROXIDE, MAGNESIUM HYDROXIDE, AND DIMETHICONE 10 ML: 400; 400; 40 SUSPENSION ORAL at 04:12

## 2024-12-03 RX ADMIN — ATORVASTATIN CALCIUM 20 MG: 20 TABLET, FILM COATED ORAL at 08:12

## 2024-12-03 RX ADMIN — GABAPENTIN 600 MG: 250 SOLUTION ORAL at 02:12

## 2024-12-03 RX ADMIN — LEVETIRACETAM 1000 MG: 500 SOLUTION ORAL at 10:12

## 2024-12-03 RX ADMIN — HEPARIN SODIUM 7500 UNITS: 5000 INJECTION INTRAVENOUS; SUBCUTANEOUS at 10:12

## 2024-12-03 RX ADMIN — HEPARIN SODIUM 7500 UNITS: 5000 INJECTION INTRAVENOUS; SUBCUTANEOUS at 05:12

## 2024-12-03 RX ADMIN — CITALOPRAM HYDROBROMIDE 10 MG: 10 TABLET ORAL at 08:12

## 2024-12-03 RX ADMIN — HEPARIN SODIUM 7500 UNITS: 5000 INJECTION INTRAVENOUS; SUBCUTANEOUS at 02:12

## 2024-12-03 RX ADMIN — GABAPENTIN 600 MG: 250 SOLUTION ORAL at 10:12

## 2024-12-03 RX ADMIN — HYDRALAZINE HYDROCHLORIDE 50 MG: 25 TABLET ORAL at 02:12

## 2024-12-03 RX ADMIN — SULFAMETHOXAZOLE AND TRIMETHOPRIM 1 TABLET: 800; 160 TABLET ORAL at 08:12

## 2024-12-03 RX ADMIN — LEVETIRACETAM 1000 MG: 500 SOLUTION ORAL at 08:12

## 2024-12-03 RX ADMIN — HYDRALAZINE HYDROCHLORIDE 50 MG: 25 TABLET ORAL at 10:12

## 2024-12-03 RX ADMIN — SULFAMETHOXAZOLE AND TRIMETHOPRIM 1 TABLET: 800; 160 TABLET ORAL at 10:12

## 2024-12-03 NOTE — PLAN OF CARE
Dario Glover - Neurosurgery (Hospital)  Discharge Reassessment    Primary Care Provider: Lei Garcia MD    Expected Discharge Date: 12/4/2024    Reassessment (most recent)       Discharge Reassessment - 12/03/24 1223          Discharge Reassessment    Assessment Type Discharge Planning Reassessment     Did the patient's condition or plan change since previous assessment? No     Discharge Plan A Skilled Nursing Facility     Discharge Plan B Home Health     DME Needed Upon Discharge  other (see comments)   TBD    Transition of Care Barriers None     Why the patient remains in the hospital Requires continued medical care        Post-Acute Status    Post-Acute Authorization Placement     Post-Acute Placement Status Pending medical clearance/testing     Coverage Payor: [employer] MEDICARE - [employer] [facility] SPECIAL NEEDS - CAPITATED     Discharge Delays Change in Medical Condition                   Patients dispo plan is SNF VS home with home health. Jeff is following patient pending medical readiness.     Patient not yet medically ready for discharge. CM team to follow.     Discharge Plan A and Plan B have been determined by review of patient's clinical status, future medical and therapeutic needs, and coverage/benefits for post-acute care in coordination with multidisciplinary team members.    STEVEN Sheridan  [facility]   Ext: [phone]

## 2024-12-03 NOTE — PROGRESS NOTES
Dario Glover - Neurosurgery ([facility])  Neurosurgery  Progress Note    Subjective:     History of Present Illness: Isabel Coats is a 73-year-old female past medical history of hypertension, obstructive sleep apnea on CPAP, suggestive heart failure, obesity, history of gastric cancer status post chemoradiation and DVT in currently on Eliquis. She presents to [facility] L pterional crani, 7 clips applied in proximity to large irregular L MCA bifurcation aneurysm and other adjacent small aneurysms, 1 clip applied across neck of aneurysm just proximal to anterior choroidal.  She also has a current smoker she was initially referred to Neurology for workup for dizziness. She had been complaining of vertigo multiple times a day for the past several months, associated with shortness of breath, palpitation, nausea, headache, and feeling faint. She is admitted to [facility] for a higher level of care.     Post-Op Info:  Procedure(s) (LRB):  CRANIOTOMY, WITH ANEURYSM CLIPPING W/ STEALTH (Left)  CRANIOTOMY, FOR ANEURYSM OF VERTEBROBASILAR OR CAROTID CIRCULATION (Left)  DISSECTION, NERVE, USING OPERATING MICROSCOPE (Left)   13 Days Post-Op   Interval History: NAEON. Net negative this am, 500cc bolus given. Nursing reports poor po intake. TCD yesterday with continued mild Left MCA vasospasm vs hyperemia. Pt noted to have oral thrush this am. Remains neuro stable    Medications:  Continuous Infusions:  Scheduled Meds:   atorvastatin  20 mg Oral Daily    citalopram  10 mg Oral Daily    gabapentin  600 mg Oral Q8H    heparin (porcine)  7,500 Units Subcutaneous Q8H    hydrALAZINE  50 mg Oral Q8H    levetiracetam  1,000 mg Oral BID    lisinopriL  40 mg Oral Daily    sulfamethoxazole-trimethoprim 800-160mg  1 tablet Oral BID     PRN Meds:  Current Facility-Administered Medications:     acetaminophen, 650 mg, Oral, Q4H PRN    bisacodyL, 10 mg, Rectal, Daily PRN    hydrALAZINE, 10 mg, Intravenous, Q4H PRN    labetalol, 10 mg, Intravenous, Q4H PRN     morphine, 2 mg, Intravenous, Q6H PRN    naloxone, 0.4 mg, Intravenous, PRN    ondansetron, 4 mg, Intravenous, Q12H PRN    oxyCODONE, 5 mg, Oral, Q6H PRN    sodium chloride 0.9%, 10 mL, Intravenous, PRN     Objective:     Weight: (!) 148.3 kg (327 lb)  Body mass index is 52.78 kg/m².  Vital Signs (Most Recent):  Temp: 98 °F (36.7 °C) (12/03/24 0708)  Pulse: 71 (12/03/24 1052)  Resp: 18 (12/03/24 0708)  BP: (!) 124/59 (12/03/24 0708)  SpO2: (!) 94 % (12/03/24 0708) Vital Signs (24h Range):  Temp:  [98 °F (36.7 °C)-99.2 °F (37.3 °C)] 98 °F (36.7 °C)  Pulse:  [69-88] 71  Resp:  [16-18] 18  SpO2:  [94 %-96 %] 94 %  BP: (107-137)/(53-63) 124/59                         Female External Urinary Catheter w/ Suction 11/21/24 1400 (Active)   Skin no redness;no breakdown 12/02/24 0800   Tolerance no signs/symptoms of discomfort 12/02/24 0800   Suction Continuous suction at 70 mmHg 12/02/24 0800   Date of last wick change 12/01/24 12/02/24 0800   Time of last wick change 0800 12/02/24 0800   Output (mL) 300 mL 12/02/24 0455     Neurosurgery Physical Exam  General: well developed, well nourished, no distress.   Head: normocephalic, cranial incision c/d/I with chromic suture  Mental Status: Awake, Alert, Oriented x 2  Speech: Clear with content appropriate to conversation, mild aphasia  Cranial nerves: face symmetric, CN II-XII grossly intact.   Eyes: pupils equal, round, reactive to light, EOMI.  Sensory: intact to light touch throughout  Motor Strength: Moves all extremities spontaneously with good tone.At least antigravity strength in upper and lower extremities. No focal weakness identified. No abnormal movements seen.   Follows commands x 4 extremities  Skin: Skin is warm, dry and intact.      Significant Labs:  Recent Labs   Lab 12/03/24  0600   GLU 96      K 4.5   *   CO2 21*   BUN 23   CREATININE 0.9   CALCIUM 10.1   MG 2.3     Recent Labs   Lab 12/03/24  0600   WBC 9.89   HGB 9.3*   HCT 30.0*        No  "results for input(s): "LABPT", "INR", "APTT" in the last 48 hours.  Microbiology Results (last 7 days)       ** No results found for the last 168 hours. **          All pertinent labs from the last 24 hours have been reviewed.    Significant Diagnostics:  I have reviewed and interpreted all pertinent imaging results/findings within the past 24 hours.    Assessment/Plan:     * Cerebral aneurysm  Isabel Coats is a 73-year-old female past medical history of hypertension, obstructive sleep apnea on CPAP, suggestive heart failure, obesity, history of gastric cancer status post chemoradiation and DVT in currently on Eliquis. She presents to [facility] s/p L pterional crani on 11/20 with 7 clips applied in proximity to large irregular L MCA bifurcation aneurysm and other adjacent small aneurysms, 1 clip applied across neck of aneurysm just proximal to anterior choroidal.  After surgery she seemed to be gradually waking up appropriately overnight, then AM of POD1 around shift change had an unexplained decline in exam during which stat CT showed IPH in the right side of her cerebellum. She became less responsive as well as developed a L gaze preference and dense aphasia. She had a perfusion scan with questionable perfusion deficit to the left anterior temporal lobe and milrinone was started with seemingly some clinical improvement. Now gaze preference resolved, following commands x4 antigravity, able to state name but still remains aphasic.    Imaging:  CTA Head 11/20: expected post-op changes  CTH 11/21 8 AM: right sided cerebellar IPH  CTA 11/21 10 AM: stable IPH, possible frontal contusion  CTP 11/21 4 PM: possible left anterior temporal perfusion deficit in territory of branches distal to MCA clips   MRI brain w wo 11/22: no strokes, post op changes, stable cerebellar bleed. some edema in right frontal lobe.    Plans:  -Downgraded to NPU under NSGY  -q4h neurochecks/vitals  -SBP <160  -drain removed 11/25  -keppra 1g BID " postop. cEEG negative during initial AMS POD1  -Cerebral vasospasm: weaned off milrinone prior to step down  - Continue daily TCDs x 14 days.  - Keep euvolemic to net positive. Bolus PRN.   - Strict I/O. Please document I/O every 4 hours.   -PT/OT/OOB  -SLP - tolerating current diet but with poor intake. Continue SLP. Encourage PO fluids since NGT removed 11/28. Diet advanced to soft and bite sized 12/2  -Continue nutritional support with boost supplementation  -Continue to monitor clinically, notify NSGY immediately with any changes in neuro status   -SQH for DVT ppx    Dispo: pending placement to [facility]    Case and plan discussed with attending neurosurgeon Dr. Simon     Oral thrush  - Dukes solution ordered QID  - Continue oral care     Abscess  Small R inner thigh abscess noted with seropurulent drainage.  Gen Surg consulted 11/29:  This is spontaneously and adequately draining. No significant fluctuance or induration.     -No surgical intervention indicated or recommended at this time as this area is draining well  -CTM. Wound Care following.  -Bactrim started 11/29, continue x 7 days    Pure hypercholesterolemia  - home lipitor 20       Chronic diastolic congestive heart failure    Latest ECHO  Results for orders placed during the hospital encounter of 10/22/24    Echo    Interpretation Summary    Left Ventricle: The left ventricle is normal in size. Normal wall thickness. There is normal systolic function with a visually estimated ejection fraction of 60 - 65%. There is normal diastolic function.    Right Ventricle: Normal right ventricular cavity size. Wall thickness is normal. Systolic function is normal.    Left Atrium: Left atrium is severely dilated.    Right Atrium: Right atrium is dilated.    Aortic Valve: The aortic valve is a trileaflet valve. There is moderate aortic valve sclerosis. Mildly restricted motion.    Tricuspid Valve: There is mild regurgitation.    IVC/SVC: Normal venous pressure at 3  mmHg.    Current Heart Failure Medications  hydrALAZINE injection 10 mg, Every 4 hours PRN, Intravenous  labetalol 20 mg/4 mL (5 mg/mL) IV syring, Every 4 hours PRN, Intravenous  hydrALAZINE tablet 50 mg, Every 8 hours, Oral  lisinopriL tablet 40 mg, Daily, Oral    Plan  - Monitor strict I&Os and daily weights.    - On telemetry  - The patient's volume status is stable        Rossana Pike PA-C  Neurosurgery  Dario Glover - Neurosurgery ([facility])

## 2024-12-03 NOTE — SUBJECTIVE & OBJECTIVE
Interval History: NAEON. Net negative this am, 500cc bolus given. Nursing reports poor po intake. TCD yesterday with continued mild Left MCA vasospasm vs hyperemia. Pt noted to have oral thrush this am. Remains neuro stable    Medications:  Continuous Infusions:  Scheduled Meds:   atorvastatin  20 mg Oral Daily    citalopram  10 mg Oral Daily    gabapentin  600 mg Oral Q8H    heparin (porcine)  7,500 Units Subcutaneous Q8H    hydrALAZINE  50 mg Oral Q8H    levetiracetam  1,000 mg Oral BID    lisinopriL  40 mg Oral Daily    sulfamethoxazole-trimethoprim 800-160mg  1 tablet Oral BID     PRN Meds:  Current Facility-Administered Medications:     acetaminophen, 650 mg, Oral, Q4H PRN    bisacodyL, 10 mg, Rectal, Daily PRN    hydrALAZINE, 10 mg, Intravenous, Q4H PRN    labetalol, 10 mg, Intravenous, Q4H PRN    morphine, 2 mg, Intravenous, Q6H PRN    naloxone, 0.4 mg, Intravenous, PRN    ondansetron, 4 mg, Intravenous, Q12H PRN    oxyCODONE, 5 mg, Oral, Q6H PRN    sodium chloride 0.9%, 10 mL, Intravenous, PRN     Objective:     Weight: (!) 148.3 kg (327 lb)  Body mass index is 52.78 kg/m².  Vital Signs (Most Recent):  Temp: 98 °F (36.7 °C) (12/03/24 0708)  Pulse: 71 (12/03/24 1052)  Resp: 18 (12/03/24 0708)  BP: (!) 124/59 (12/03/24 0708)  SpO2: (!) 94 % (12/03/24 0708) Vital Signs (24h Range):  Temp:  [98 °F (36.7 °C)-99.2 °F (37.3 °C)] 98 °F (36.7 °C)  Pulse:  [69-88] 71  Resp:  [16-18] 18  SpO2:  [94 %-96 %] 94 %  BP: (107-137)/(53-63) 124/59                         Female External Urinary Catheter w/ Suction 11/21/24 1400 (Active)   Skin no redness;no breakdown 12/02/24 0800   Tolerance no signs/symptoms of discomfort 12/02/24 0800   Suction Continuous suction at 70 mmHg 12/02/24 0800   Date of last wick change 12/01/24 12/02/24 0800   Time of last wick change 0800 12/02/24 0800   Output (mL) 300 mL 12/02/24 0455     Neurosurgery Physical Exam  General: well developed, well nourished, no distress.   Head: normocephalic,  "cranial incision c/d/I with chromic suture  Mental Status: Awake, Alert, Oriented x 2  Speech: Clear with content appropriate to conversation, mild aphasia  Cranial nerves: face symmetric, CN II-XII grossly intact.   Eyes: pupils equal, round, reactive to light, EOMI.  Sensory: intact to light touch throughout  Motor Strength: Moves all extremities spontaneously with good tone.At least antigravity strength in upper and lower extremities. No focal weakness identified. No abnormal movements seen.   Follows commands x 4 extremities  Skin: Skin is warm, dry and intact.      Significant Labs:  Recent Labs   Lab 12/03/24  0600   GLU 96      K 4.5   *   CO2 21*   BUN 23   CREATININE 0.9   CALCIUM 10.1   MG 2.3     Recent Labs   Lab 12/03/24  0600   WBC 9.89   HGB 9.3*   HCT 30.0*        No results for input(s): "LABPT", "INR", "APTT" in the last 48 hours.  Microbiology Results (last 7 days)       ** No results found for the last 168 hours. **          All pertinent labs from the last 24 hours have been reviewed.    Significant Diagnostics:  I have reviewed and interpreted all pertinent imaging results/findings within the past 24 hours.    "

## 2024-12-03 NOTE — PLAN OF CARE
Problem: Adult Inpatient Plan of Care  Goal: Plan of Care Review  Outcome: Progressing  Goal: Patient-Specific Goal (Individualized)  Outcome: Progressing  Goal: Absence of Hospital-Acquired Illness or Injury  Outcome: Progressing     Problem: Wound  Goal: Optimal Coping  Outcome: Progressing  Goal: Optimal Functional Ability  Outcome: Progressing     Problem: Infection  Goal: Absence of Infection Signs and Symptoms  Outcome: Progressing

## 2024-12-03 NOTE — PT/OT/SLP PROGRESS
"Speech Language Pathology Treatment    Patient Name:  Ca Coats   MRN:  [id number]  Admitting Diagnosis: Cerebral aneurysm    Recommendations:                 General Recommendations:  Dysphagia therapy, Speech/language therapy, and Cognitive-linguistic therapy  Diet recommendations:  Regular Diet - IDDSI Level 7, Liquid Diet Level: Thin liquids - IDDSI Level 0   Aspiration Precautions: 1 bite/sip at a time, Alternating bites/sips, Assistance with meals, Avoid talking while eating, Check for pocketing/oral residue, Eliminate distractions, Feed only when awake/alert, HOB to 90 degrees, Meds whole 1 at a time, Small bites/sips, and Strict aspiration precautions   General Precautions: Standard, aspiration, fall, seizure  Communication strategies:  provide increased time to answer and go to room if call light pushed    Assessment:     Ca Coats is a 73 y.o. female with an SLP diagnosis of Aphasia, Dysphagia, and Cognitive-Linguistic Impairment.     Subjective     "I'm okay"     Pain/Comfort:  Pain Rating 1: 0/10  Pain Rating Post-Intervention 1: 0/10    Respiratory Status: Room air    Objective:     Has the patient been evaluated by SLP for swallowing?   Yes  Keep patient NPO? No     Pt seen bedside, alert and cooperative.  Nurse at bedside reports poor intake and white coated tongue consistent with thrush.  White lingual surface visualized.  Pt tolerated solids trials with prolonged mastication though adequate oral clearance and no overt s/s aspiration.  Thin liquids presented via straw without overt s/s aspiration.  Continued L lip twitching observed.  Pt with some appropriate responses to simple open ended questions though mod A including field of 2, multiple repetitions,a dn occasional y/n questions needed to express basic ideas.  She was oriented alejandro self IND and place given field of 2.  Auto speech task of counting 1-10 completed with min cues.  Perseveration evident during task and with " attempt at auto phrase completion.  Simple commands followed with 60% accy IND and 1005 given max cues.  Pt with noted difficulty seeing ADL objects, able to name object x1 when R eye covered.  Education provided re: ongoing SLP POC, diet recs, swallow precs, and language strategies.  Education to be ongoing.      Goals:   Multidisciplinary Problems       SLP Goals          Problem: SLP    Goal Priority Disciplines Outcome   SLP Goal     SLP Progressing   Description: Speech Language Pathology Goals  Goals expected to be met by 12/5:  1) Pt will participate in ongoing assessment of swallow function to determine least restrictive diet.  2) Pt will participate in ongoing speech, language, cognitive evaluation to determine possible goals and poc.   3) Pt will respond to simple yes/no questions with 80% accy given min cues.  4) Pt will complete automatic speech tasks with 90% accy given min cues.  5) Pt will complete naming tasks with 80% accy given mod cues.  6) Pt will follow simple commands with 90% accy given mod cues.                                 Plan:     Patient to be seen:  4 x/week   Plan of Care expires:  12/22/24  Plan of Care reviewed with:  patient   SLP Follow-Up:  Yes       Discharge recommendations:  Moderate Intensity Therapy   Barriers to Discharge:  Level of Skilled Assistance Needed \    Time Tracking:     SLP Treatment Date:   12/03/24  Speech Start Time:  0943  Speech Stop Time:  0959     Speech Total Time (min):  16 min    Billable Minutes: Speech Therapy Individual 8 and Treatment Swallowing Dysfunction 8    12/03/2024

## 2024-12-03 NOTE — PT/OT/SLP PROGRESS
Occupational Therapy      Patient Name:  Ca Coats   MRN:  [id number]    Patient not seen today secondary to pt off the floor for procedure.  Will follow-up as scheduled per OT POC.    12/3/2024

## 2024-12-03 NOTE — ASSESSMENT & PLAN NOTE
Isabel Coats is a 73-year-old female past medical history of hypertension, obstructive sleep apnea on CPAP, suggestive heart failure, obesity, history of gastric cancer status post chemoradiation and DVT in currently on Eliquis. She presents to [facility] s/p L pterional crani on 11/20 with 7 clips applied in proximity to large irregular L MCA bifurcation aneurysm and other adjacent small aneurysms, 1 clip applied across neck of aneurysm just proximal to anterior choroidal.  After surgery she seemed to be gradually waking up appropriately overnight, then AM of POD1 around shift change had an unexplained decline in exam during which stat CT showed IPH in the right side of her cerebellum. She became less responsive as well as developed a L gaze preference and dense aphasia. She had a perfusion scan with questionable perfusion deficit to the left anterior temporal lobe and milrinone was started with seemingly some clinical improvement. Now gaze preference resolved, following commands x4 antigravity, able to state name but still remains aphasic.    Imaging:  CTA Head 11/20: expected post-op changes  CTH 11/21 8 AM: right sided cerebellar IPH  CTA 11/21 10 AM: stable IPH, possible frontal contusion  CTP 11/21 4 PM: possible left anterior temporal perfusion deficit in territory of branches distal to MCA clips   MRI brain w wo 11/22: no strokes, post op changes, stable cerebellar bleed. some edema in right frontal lobe.    Plans:  -Downgraded to NPU under NSGY  -q4h neurochecks/vitals  -SBP <160  -drain removed 11/25  -keppra 1g BID postop. cEEG negative during initial AMS POD1  -Cerebral vasospasm: weaned off milrinone prior to step down  - Continue daily TCDs x 14 days.  - Keep euvolemic to net positive. Bolus PRN.   - Strict I/O. Please document I/O every 4 hours.   -PT/OT/OOB  -SLP - tolerating current diet but with poor intake. Continue SLP. Encourage PO fluids since NGT removed 11/28. Diet advanced to soft and bite sized  12/2  -Continue nutritional support with boost supplementation  -Continue to monitor clinically, notify NSGY immediately with any changes in neuro status   -SQH for DVT ppx    Dispo: pending placement to [facility]    Case and plan discussed with attending neurosurgeon Dr. Simon

## 2024-12-03 NOTE — PLAN OF CARE
Problem: Adult Inpatient Plan of Care  Goal: Plan of Care Review  Outcome: Progressing  Goal: Patient-Specific Goal (Individualized)  Outcome: Progressing  Goal: Absence of Hospital-Acquired Illness or Injury  Outcome: Progressing  Goal: Optimal Comfort and Wellbeing  Outcome: Progressing     Problem: Wound  Goal: Optimal Pain Control and Function  Outcome: Progressing  Goal: Skin Health and Integrity  Outcome: Progressing     Problem: Stroke, Intracerebral Hemorrhage  Goal: Optimal Cerebral Tissue Perfusion  Outcome: Progressing     Problem: Skin Injury Risk Increased  Goal: Skin Health and Integrity  Outcome: Progressing

## 2024-12-04 PROCEDURE — 92507 TX SP LANG VOICE COMM INDIV: CPT | Mod: HCNC

## 2024-12-04 PROCEDURE — 99024 POSTOP FOLLOW-UP VISIT: CPT | Mod: HCNC,,, | Performed by: PHYSICIAN ASSISTANT

## 2024-12-04 PROCEDURE — 11000001 HC ACUTE MED/SURG PRIVATE ROOM: Mod: HCNC

## 2024-12-04 PROCEDURE — 92526 ORAL FUNCTION THERAPY: CPT | Mod: HCNC

## 2024-12-04 PROCEDURE — 97112 NEUROMUSCULAR REEDUCATION: CPT | Mod: HCNC

## 2024-12-04 PROCEDURE — 63600175 PHARM REV CODE 636 W HCPCS: Mod: HCNC | Performed by: NURSE PRACTITIONER

## 2024-12-04 PROCEDURE — 25000003 PHARM REV CODE 250: Mod: HCNC | Performed by: PHYSICIAN ASSISTANT

## 2024-12-04 PROCEDURE — 94761 N-INVAS EAR/PLS OXIMETRY MLT: CPT | Mod: HCNC

## 2024-12-04 PROCEDURE — 97110 THERAPEUTIC EXERCISES: CPT | Mod: HCNC

## 2024-12-04 RX ADMIN — SULFAMETHOXAZOLE AND TRIMETHOPRIM 1 TABLET: 800; 160 TABLET ORAL at 09:12

## 2024-12-04 RX ADMIN — CITALOPRAM HYDROBROMIDE 10 MG: 10 TABLET ORAL at 08:12

## 2024-12-04 RX ADMIN — ALUMINUM HYDROXIDE, MAGNESIUM HYDROXIDE, AND DIMETHICONE 10 ML: 400; 400; 40 SUSPENSION ORAL at 08:12

## 2024-12-04 RX ADMIN — LEVETIRACETAM 1000 MG: 500 SOLUTION ORAL at 08:12

## 2024-12-04 RX ADMIN — HEPARIN SODIUM 7500 UNITS: 5000 INJECTION INTRAVENOUS; SUBCUTANEOUS at 05:12

## 2024-12-04 RX ADMIN — LEVETIRACETAM 1000 MG: 500 SOLUTION ORAL at 09:12

## 2024-12-04 RX ADMIN — ALUMINUM HYDROXIDE, MAGNESIUM HYDROXIDE, AND DIMETHICONE 10 ML: 400; 400; 40 SUSPENSION ORAL at 02:12

## 2024-12-04 RX ADMIN — LISINOPRIL 40 MG: 20 TABLET ORAL at 08:12

## 2024-12-04 RX ADMIN — SULFAMETHOXAZOLE AND TRIMETHOPRIM 1 TABLET: 800; 160 TABLET ORAL at 08:12

## 2024-12-04 RX ADMIN — GABAPENTIN 600 MG: 250 SOLUTION ORAL at 05:12

## 2024-12-04 RX ADMIN — ALUMINUM HYDROXIDE, MAGNESIUM HYDROXIDE, AND DIMETHICONE 10 ML: 400; 400; 40 SUSPENSION ORAL at 05:12

## 2024-12-04 RX ADMIN — GABAPENTIN 600 MG: 250 SOLUTION ORAL at 09:12

## 2024-12-04 RX ADMIN — HYDRALAZINE HYDROCHLORIDE 50 MG: 25 TABLET ORAL at 05:12

## 2024-12-04 RX ADMIN — GABAPENTIN 600 MG: 250 SOLUTION ORAL at 03:12

## 2024-12-04 RX ADMIN — HEPARIN SODIUM 7500 UNITS: 5000 INJECTION INTRAVENOUS; SUBCUTANEOUS at 02:12

## 2024-12-04 RX ADMIN — HYDRALAZINE HYDROCHLORIDE 50 MG: 25 TABLET ORAL at 09:12

## 2024-12-04 RX ADMIN — OXYCODONE 5 MG: 5 TABLET ORAL at 08:12

## 2024-12-04 RX ADMIN — ATORVASTATIN CALCIUM 20 MG: 20 TABLET, FILM COATED ORAL at 08:12

## 2024-12-04 RX ADMIN — HEPARIN SODIUM 7500 UNITS: 5000 INJECTION INTRAVENOUS; SUBCUTANEOUS at 09:12

## 2024-12-04 RX ADMIN — HYDRALAZINE HYDROCHLORIDE 50 MG: 25 TABLET ORAL at 03:12

## 2024-12-04 RX ADMIN — ALUMINUM HYDROXIDE, MAGNESIUM HYDROXIDE, AND DIMETHICONE 10 ML: 400; 400; 40 SUSPENSION ORAL at 10:12

## 2024-12-04 NOTE — PT/OT/SLP PROGRESS
"Physical Therapy Treatment    Patient Name:  Ca Coats   MRN:  [id number]    Recommendations:     Discharge Recommendations: High Intensity Therapy  Discharge Equipment Recommendations: hospital bed, lift device, wheelchair  Barriers to discharge: Inaccessible home and Decreased caregiver support    Assessment:     Ca Coats is a 73 y.o. female admitted with a medical diagnosis of Cerebral aneurysm.  She presents with the following impairments/functional limitations: weakness, impaired endurance, impaired self care skills, impaired functional mobility, gait instability, impaired balance, impaired cognition, decreased upper extremity function, decreased lower extremity function. Isabel participated well in session. She progressed with bed mobility, requiring only minimal assistance for supine to sit and rolling with increased time to complete activity. Throughout session she was able to communicate with words and some sentences with difficulty word finding, and was able to follow all commands. She achieved 50% sit to stand transfer with significant assistance. Overall patient is making progress toward goals and remains motivated.      Rehab Prognosis: Good; patient would benefit from acute skilled PT services to address these deficits and reach maximum level of function.    Recent Surgery: Procedure(s) (LRB):  CRANIOTOMY, WITH ANEURYSM CLIPPING W/ STEALTH (Left)  CRANIOTOMY, FOR ANEURYSM OF VERTEBROBASILAR OR CAROTID CIRCULATION (Left)  DISSECTION, NERVE, USING OPERATING MICROSCOPE (Left) 14 Days Post-Op    Plan:     During this hospitalization, patient to be seen 4 x/week to address the identified rehab impairments via gait training, therapeutic activities, therapeutic exercises, neuromuscular re-education and progress toward the following goals:    Plan of Care Expires:  12/24/24    Subjective     Chief Complaint: aphasia, mobility level  Patient/Family Comments/goals: "why is it harder" " patient discussing aphasia during dual task activity (sitting up and attempting to communicate).   Pain/Comfort:  Pain Rating 1:  (not rated)  Location - Side 1: Bilateral  Location - Orientation 1: generalized  Location 1: head (pointing to incision)      Objective:      Patient found HOB elevated with telemetry, PureWick upon PT entry to room.     General Precautions: Standard, fall, aspiration, seizure  Orthopedic Precautions: N/A  Braces: N/A  Respiratory Status: Room air     Cognition:   Affect: pleasant and cooperative  Alertness: eyes open 100 % of session  Attention: attends to task  Command Following: patient follows 100 % of 1-step commands  Communication: aphasic, able to communicate words and some sentences    Functional Mobility:  Bed Mobility:     Rolling Left:  moderate assistance at hips  Rolling Right: minimum assistance with UE on bed railing   Scooting: contact guard assistance  Supine to Sit: minimum assistance, increased time, HOB elevated  Sit to Supine: maximal assistance at LE  Transfers:     Sit to Stand:  maximal assistance with no AD  Achieved 10%, 10%, 50% of stand across trials  Patient not wanting to attempt with walker   Balance:   Sitting static: CGA to SBA  Sitting dynamic: Livia      AM-PAC 6 CLICK MOBILITY  Turning over in bed (including adjusting bedclothes, sheets and blankets)?: 3  Sitting down on and standing up from a chair with arms (e.g., wheelchair, bedside commode, etc.): 2  Moving from lying on back to sitting on the side of the bed?: 3  Moving to and from a bed to a chair (including a wheelchair)?: 1  Need to walk in hospital room?: 1  Climbing 3-5 steps with a railing?: 1  Basic Mobility Total Score: 11       Treatment & Education:  Cues during rolling and bed mobility for UE reaching and positioning on bed railings, LE management with rolling technique, cues for visual attention to UE for initiation. Assistance as above.   Seated BLE kicks and ankle pumps x10 Cues for  slow and controlled movement.    Cues with sitting EOB to activate core mm to center body to midline and decrease assistance level needed.   Verbal and tactile cues with assist during STS transfer for optimal VY prior to transfer, forward weight shift to initiate, to engage LE mm, extend hips forward and bring head and chest up to achieve full upright stance.      Patient left HOB elevated with all lines intact and call button in reach. Bed alarm on.     GOALS:   Multidisciplinary Problems       Physical Therapy Goals          Problem: Physical Therapy    Goal Priority Disciplines Outcome Interventions   Physical Therapy Goal     PT, PT/OT Progressing    Description: Goals to be met by: 2024     Patient will increase functional independence with mobility by performin. Supine to sit with MInimal Assistance  2. Sit to supine with Minimal Assistance  3. Sit to stand transfer with Moderate Assistance  4. Bed to chair transfer with Maximum Assistance using LRAD  5. Gait  x 5 feet with Maximum Assistance using LRAD.   6. Sitting at edge of bed x5 minutes with Stand-by Assistance  7. Lower extremity exercise program x15 reps per handout, with assistance as needed    Hospital Bed - Patient requires a hospital bed for positioning of the body in ways that are not feasible with an ordinary bed. The patient requires special positioning for pain relief, limited mobility, and/or being unable to independently make changes in body position without the use of a hospital bed. Pillows and wedges will not be adequate for resolving these positional issues.     Wheelchair - Patient has a mobility limitation that significantly impairs their ability to participate in one or more mobility related activities of daily living in customary locations in the home. The mobility limitation cannot be sufficiently resolved by the use of a cane or walker. The use of a manual wheelchair will greatly improve the patient's ability to  participate in MRADLs. The patient will use the wheelchair on a regular basis at home. They have expressed their willingness to use a manual wheelchair in the home, and have a caregiver who is available and willing to assist with the wheelchair if needed                         Time Tracking:     PT Received On: 12/04/24  PT Start Time: 1029     PT Stop Time: 1055  PT Total Time (min): 26 min     Billable Minutes: Therapeutic Exercise 10 minutes and Neuromuscular Re-education 16 minutes    Treatment Type: Treatment  PT/PTA: PT     Number of PTA visits since last PT visit: 0     12/04/2024

## 2024-12-04 NOTE — SUBJECTIVE & OBJECTIVE
Interval History: NAEON. I/O + 60 today. Goal euvolemic. Encourage PO intake. I/O every 4 hours. TCDs today with improvement in DIANE and MCA velocities, mild increase in right and left lindegaard ratio. Repeat TCD tomorrow. Exam stable.     Medications:  Continuous Infusions:  Scheduled Meds:   atorvastatin  20 mg Oral Daily    citalopram  10 mg Oral Daily    duke's soln (benadryl 30 mL, mylanta 30 mL, LIDOcaine 30 mL, nystatin 30 mL) 120 mL  10 mL Oral QID    gabapentin  600 mg Oral Q8H    heparin (porcine)  7,500 Units Subcutaneous Q8H    hydrALAZINE  50 mg Oral Q8H    levetiracetam  1,000 mg Oral BID    lisinopriL  40 mg Oral Daily    sulfamethoxazole-trimethoprim 800-160mg  1 tablet Oral BID     PRN Meds:  Current Facility-Administered Medications:     acetaminophen, 650 mg, Oral, Q4H PRN    bisacodyL, 10 mg, Rectal, Daily PRN    hydrALAZINE, 10 mg, Intravenous, Q4H PRN    labetalol, 10 mg, Intravenous, Q4H PRN    morphine, 2 mg, Intravenous, Q6H PRN    naloxone, 0.4 mg, Intravenous, PRN    ondansetron, 4 mg, Intravenous, Q12H PRN    oxyCODONE, 5 mg, Oral, Q6H PRN    sodium chloride 0.9%, 10 mL, Intravenous, PRN     Objective:     Weight: (!) 148.3 kg (327 lb)  Body mass index is 52.78 kg/m².  Vital Signs (Most Recent):  Temp: 98.6 °F (37 °C) (12/04/24 1536)  Pulse: 69 (12/04/24 1536)  Resp: 16 (12/04/24 1536)  BP: (!) 114/57 (12/04/24 1536)  SpO2: (!) 93 % (12/04/24 1536) Vital Signs (24h Range):  Temp:  [97.8 °F (36.6 °C)-98.6 °F (37 °C)] 98.6 °F (37 °C)  Pulse:  [61-84] 69  Resp:  [16-18] 16  SpO2:  [93 %-97 %] 93 %  BP: (109-140)/(51-60) 114/57     Date 12/04/24 0700 - 12/05/24 0659   Shift 2427-0681 0588-6957 2644-4494 24 Hour Total   INTAKE   P.O. 390 120  510   Shift Total(mL/kg) 390(2.6) 120(0.8)  510(3.4)   OUTPUT   Urine(mL/kg/hr)  450  450   Shift Total(mL/kg)  450(3)  450(3)   Weight (kg) 148.3 148.3 148.3 148.3                       Female External Urinary Catheter w/ Suction 11/21/24 1400 (Active)  "  Skin no redness;no breakdown 12/02/24 0800   Tolerance no signs/symptoms of discomfort 12/02/24 0800   Suction Continuous suction at 70 mmHg 12/02/24 0800   Date of last wick change 12/01/24 12/02/24 0800   Time of last wick change 0800 12/02/24 0800   Output (mL) 300 mL 12/02/24 0455     Neurosurgery Physical Exam  General: well developed, well nourished, no distress.   Head: normocephalic, cranial incision c/d/I with chromic suture  Mental Status: Awake, Alert, Oriented x 2  Speech: Clear with content appropriate to conversation, mild aphasia  Cranial nerves: face symmetric, CN II-XII grossly intact.   Eyes: pupils equal, round, reactive to light, EOMI.  Sensory: intact to light touch throughout  Motor Strength: Moves all extremities spontaneously with good tone.At least antigravity strength in upper and lower extremities. No focal weakness identified. No abnormal movements seen.   Follows commands x 4 extremities  Skin: Skin is warm, dry and intact.      Significant Labs:  Recent Labs   Lab 12/03/24  0600   GLU 96      K 4.5   *   CO2 21*   BUN 23   CREATININE 0.9   CALCIUM 10.1   MG 2.3     Recent Labs   Lab 12/03/24  0600   WBC 9.89   HGB 9.3*   HCT 30.0*        No results for input(s): "LABPT", "INR", "APTT" in the last 48 hours.  Microbiology Results (last 7 days)       ** No results found for the last 168 hours. **          All pertinent labs from the last 24 hours have been reviewed.    Significant Diagnostics:  I have reviewed and interpreted all pertinent imaging results/findings within the past 24 hours.    "

## 2024-12-04 NOTE — PROGRESS NOTES
SW assigned to pt's care team. SW will continue to follow for d/c needs.        JOCELYNE Hodgson,MA

## 2024-12-04 NOTE — PLAN OF CARE
Problem: Adult Inpatient Plan of Care  Goal: Plan of Care Review  Outcome: Progressing  Goal: Patient-Specific Goal (Individualized)  Outcome: Progressing  Goal: Absence of Hospital-Acquired Illness or Injury  Outcome: Progressing  Goal: Optimal Comfort and Wellbeing  Outcome: Progressing  Goal: Readiness for Transition of Care  Outcome: Progressing     Problem: Bariatric Environmental Safety  Goal: Safety Maintained with Care  Outcome: Progressing     Problem: Wound  Goal: Optimal Coping  Outcome: Progressing  Goal: Optimal Functional Ability  Outcome: Progressing  Goal: Absence of Infection Signs and Symptoms  Outcome: Progressing  Goal: Improved Oral Intake  Outcome: Progressing  Goal: Optimal Pain Control and Function  Outcome: Progressing  Goal: Skin Health and Integrity  Outcome: Progressing  Goal: Optimal Wound Healing  Outcome: Progressing     Problem: Infection  Goal: Absence of Infection Signs and Symptoms  Outcome: Progressing     Problem: Stroke, Intracerebral Hemorrhage  Goal: Optimal Coping  Outcome: Progressing  Goal: Effective Bowel Elimination  Outcome: Progressing  Goal: Optimal Cerebral Tissue Perfusion  Outcome: Progressing  Goal: Optimal Cognitive Function  Outcome: Progressing  Goal: Effective Communication Skills  Outcome: Progressing  Goal: Optimal Functional Ability  Outcome: Progressing  Goal: Optimal Nutrition Intake  Outcome: Progressing  Goal: Optimal Pain Control and Function  Outcome: Progressing  Goal: Effective Oxygenation and Ventilation  Outcome: Progressing  Goal: Improved Sensorimotor Function  Outcome: Progressing  Goal: Safe and Effective Swallow  Outcome: Progressing  Goal: Effective Urinary Elimination  Outcome: Progressing     Problem: Fall Injury Risk  Goal: Absence of Fall and Fall-Related Injury  Outcome: Progressing     Problem: Skin Injury Risk Increased  Goal: Skin Health and Integrity  Outcome: Progressing     Problem: Restraint, Nonviolent  Goal: Absence of Harm or  Injury  Outcome: Progressing     Problem: Fatigue  Goal: Improved Activity Tolerance  Outcome: Progressing

## 2024-12-04 NOTE — PLAN OF CARE
12/04/24 1522   Post-Acute Status   Post-Acute Authorization Placement   Post-Acute Placement Status   (updated progress notes sent to [facility] in [facility] for placement.)     Worker spoke with Margareth, ryne at [facility] [phone] to inquire about placement for the patient. Worker also informed Margareth that the patient does not have a Peg tube. Ms Zuluaga informed this worker that the patient's information is being reviewed and she will contact the staff on tomorrow with a final decision.

## 2024-12-04 NOTE — ASSESSMENT & PLAN NOTE
Isabel Coats is a 73-year-old female past medical history of hypertension, obstructive sleep apnea on CPAP, suggestive heart failure, obesity, history of gastric cancer status post chemoradiation and DVT in currently on Eliquis. She presents to [facility] s/p L pterional crani on 11/20 with 7 clips applied in proximity to large irregular L MCA bifurcation aneurysm and other adjacent small aneurysms, 1 clip applied across neck of aneurysm just proximal to anterior choroidal.  After surgery she seemed to be gradually waking up appropriately overnight, then AM of POD1 around shift change had an unexplained decline in exam during which stat CT showed IPH in the right side of her cerebellum. She became less responsive as well as developed a L gaze preference and dense aphasia. She had a perfusion scan with questionable perfusion deficit to the left anterior temporal lobe and milrinone was started with seemingly some clinical improvement. Now gaze preference resolved, following commands x4 antigravity, able to state name but still remains aphasic.    Imaging:  CTA Head 11/20: expected post-op changes  CTH 11/21 8 AM: right sided cerebellar IPH  CTA 11/21 10 AM: stable IPH, possible frontal contusion  CTP 11/21 4 PM: possible left anterior temporal perfusion deficit in territory of branches distal to MCA clips   MRI brain w wo 11/22: no strokes, post op changes, stable cerebellar bleed. some edema in right frontal lobe.    Plans:  -Downgraded to NPU under NSGY  -q4h neurochecks/vitals  -SBP <160  -drain removed 11/25  -keppra 1g BID postop. cEEG negative during initial AMS POD1  -Cerebral vasospasm: weaned off milrinone prior to step down  - Continue daily TCDs x 14 days - completed. Repeat TCD ordered for 12/5.  - Keep euvolemic.   - Strict I/O. Please document I/O every 4 hours.   -PT/OT/OOB  -SLP - tolerating current diet but with poor intake. Continue SLP. Encourage PO fluids since NGT removed 11/28. Diet advanced to  regular diet.   -Continue nutritional support with boost supplementation  -Continue to monitor clinically, notify NSGY immediately with any changes in neuro status   -SQH for DVT ppx    Dispo: pending placement to [facility]    Case and plan discussed with attending neurosurgeon Dr. Friedman

## 2024-12-04 NOTE — PROGRESS NOTES
Dario Glover - Neurosurgery ([facility])  Neurosurgery  Progress Note    Subjective:     History of Present Illness: Isabel Coats is a 73-year-old female past medical history of hypertension, obstructive sleep apnea on CPAP, suggestive heart failure, obesity, history of gastric cancer status post chemoradiation and DVT in currently on Eliquis. She presents to [facility] L pterional crani, 7 clips applied in proximity to large irregular L MCA bifurcation aneurysm and other adjacent small aneurysms, 1 clip applied across neck of aneurysm just proximal to anterior choroidal.  She also has a current smoker she was initially referred to Neurology for workup for dizziness. She had been complaining of vertigo multiple times a day for the past several months, associated with shortness of breath, palpitation, nausea, headache, and feeling faint. She is admitted to [facility] for a higher level of care.     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 I/O + 60 today. Goal euvolemic. Encourage PO intake. I/O every 4 hours. TCDs today with improvement in DIANE and MCA velocities, mild increase in right and left lindegaard ratio. Repeat TCD tomorrow. Exam stable.     Medications:  Continuous Infusions:  Scheduled Meds:   atorvastatin  20 mg Oral Daily    citalopram  10 mg Oral Daily    duke's soln (benadryl 30 mL, mylanta 30 mL, LIDOcaine 30 mL, nystatin 30 mL) 120 mL  10 mL Oral QID    gabapentin  600 mg Oral Q8H    heparin (porcine)  7,500 Units Subcutaneous Q8H    hydrALAZINE  50 mg Oral Q8H    levetiracetam  1,000 mg Oral BID    lisinopriL  40 mg Oral Daily    sulfamethoxazole-trimethoprim 800-160mg  1 tablet Oral BID     PRN Meds:  Current Facility-Administered Medications:     acetaminophen, 650 mg, Oral, Q4H PRN    bisacodyL,  Per referral documentation, Vero spoke to patient and explained denial reason.   10 mg, Rectal, Daily PRN    hydrALAZINE, 10 mg, Intravenous, Q4H PRN    labetalol, 10 mg, Intravenous, Q4H PRN    morphine, 2 mg, Intravenous, Q6H PRN    naloxone, 0.4 mg, Intravenous, PRN    ondansetron, 4 mg, Intravenous, Q12H PRN    oxyCODONE, 5 mg, Oral, Q6H PRN    sodium chloride 0.9%, 10 mL, Intravenous, PRN     Objective:     Weight: (!) 148.3 kg (327 lb)  Body mass index is 52.78 kg/m².  Vital Signs (Most Recent):  Temp: 98.6 °F (37 °C) (12/04/24 1536)  Pulse: 69 (12/04/24 1536)  Resp: 16 (12/04/24 1536)  BP: (!) 114/57 (12/04/24 1536)  SpO2: (!) 93 % (12/04/24 1536) Vital Signs (24h Range):  Temp:  [97.8 °F (36.6 °C)-98.6 °F (37 °C)] 98.6 °F (37 °C)  Pulse:  [61-84] 69  Resp:  [16-18] 16  SpO2:  [93 %-97 %] 93 %  BP: (109-140)/(51-60) 114/57     Date 12/04/24 0700 - 12/05/24 0659   Shift 3002-0089 1101-7150 0112-7397 24 Hour Total   INTAKE   P.O. 390 120  510   Shift Total(mL/kg) 390(2.6) 120(0.8)  510(3.4)   OUTPUT   Urine(mL/kg/hr)  450  450   Shift Total(mL/kg)  450(3)  450(3)   Weight (kg) 148.3 148.3 148.3 148.3                       Female External Urinary Catheter w/ Suction 11/21/24 1400 (Active)   Skin no redness;no breakdown 12/02/24 0800   Tolerance no signs/symptoms of discomfort 12/02/24 0800   Suction Continuous suction at 70 mmHg 12/02/24 0800   Date of last wick change 12/01/24 12/02/24 0800   Time of last wick change 0800 12/02/24 0800   Output (mL) 300 mL 12/02/24 4626     Neurosurgery Physical Exam  General: well developed, well nourished, no distress.   Head: normocephalic, cranial incision c/d/I with chromic suture  Mental Status: Awake, Alert, Oriented x 2  Speech: Clear with content appropriate to conversation, mild aphasia  Cranial nerves: face symmetric, CN II-XII grossly intact.   Eyes: pupils equal, round, reactive to light, EOMI.  Sensory: intact to light touch throughout  Motor Strength: Moves all extremities spontaneously with good tone.At least antigravity strength in upper  "and lower extremities. No focal weakness identified. No abnormal movements seen.   Follows commands x 4 extremities  Skin: Skin is warm, dry and intact.      Significant Labs:  Recent Labs   Lab 12/03/24  0600   GLU 96      K 4.5   *   CO2 21*   BUN 23   CREATININE 0.9   CALCIUM 10.1   MG 2.3     Recent Labs   Lab 12/03/24  0600   WBC 9.89   HGB 9.3*   HCT 30.0*        No results for input(s): "LABPT", "INR", "APTT" in the last 48 hours.  Microbiology Results (last 7 days)       ** No results found for the last 168 hours. **          All pertinent labs from the last 24 hours have been reviewed.    Significant Diagnostics:  I have reviewed and interpreted all pertinent imaging results/findings within the past 24 hours.    Assessment/Plan:     * Cerebral aneurysm  Isabel Coats is a 73-year-old female past medical history of hypertension, obstructive sleep apnea on CPAP, suggestive heart failure, obesity, history of gastric cancer status post chemoradiation and DVT in currently on Eliquis. She presents to [facility] s/p L pterional crani on 11/20 with 7 clips applied in proximity to large irregular L MCA bifurcation aneurysm and other adjacent small aneurysms, 1 clip applied across neck of aneurysm just proximal to anterior choroidal.  After surgery she seemed to be gradually waking up appropriately overnight, then AM of POD1 around shift change had an unexplained decline in exam during which stat CT showed IPH in the right side of her cerebellum. She became less responsive as well as developed a L gaze preference and dense aphasia. She had a perfusion scan with questionable perfusion deficit to the left anterior temporal lobe and milrinone was started with seemingly some clinical improvement. Now gaze preference resolved, following commands x4 antigravity, able to state name but still remains aphasic.    Imaging:  CTA Head 11/20: expected post-op changes  CTH 11/21 8 AM: right sided cerebellar IPH  CTA " 11/21 10 AM: stable IPH, possible frontal contusion  CTP 11/21 4 PM: possible left anterior temporal perfusion deficit in territory of branches distal to MCA clips   MRI brain w wo 11/22: no strokes, post op changes, stable cerebellar bleed. some edema in right frontal lobe.    Plans:  -Downgraded to NPU under NSGY  -q4h neurochecks/vitals  -SBP <160  -drain removed 11/25  -keppra 1g BID postop. cEEG negative during initial AMS POD1  -Cerebral vasospasm: weaned off milrinone prior to step down  - Continue daily TCDs x 14 days - completed. Repeat TCD ordered for 12/5.  - Keep euvolemic.   - Strict I/O. Please document I/O every 4 hours.   -PT/OT/OOB  -SLP - tolerating current diet but with poor intake. Continue SLP. Encourage PO fluids since NGT removed 11/28. Diet advanced to regular diet.   -Continue nutritional support with boost supplementation  -Continue to monitor clinically, notify NSGY immediately with any changes in neuro status   -SQH for DVT ppx    Dispo: pending placement to [facility]    Case and plan discussed with attending neurosurgeon Dr. Friedman    Oral thrush  - Dukes solution ordered QID  - Continue oral care     Abscess  Small R inner thigh abscess noted with seropurulent drainage.  Gen Surg consulted 11/29:  This is spontaneously and adequately draining. No significant fluctuance or induration.     -No surgical intervention indicated or recommended at this time as this area is draining well  -CTM. Wound Care following.  -Bactrim started 11/29, continue x 7 days    Pure hypercholesterolemia  - home lipitor 20       Chronic diastolic congestive heart failure    Latest ECHO  Results for orders placed during the hospital encounter of 10/22/24    Echo    Interpretation Summary    Left Ventricle: The left ventricle is normal in size. Normal wall thickness. There is normal systolic function with a visually estimated ejection fraction of 60 - 65%. There is normal diastolic function.    Right Ventricle: Normal  right ventricular cavity size. Wall thickness is normal. Systolic function is normal.    Left Atrium: Left atrium is severely dilated.    Right Atrium: Right atrium is dilated.    Aortic Valve: The aortic valve is a trileaflet valve. There is moderate aortic valve sclerosis. Mildly restricted motion.    Tricuspid Valve: There is mild regurgitation.    IVC/SVC: Normal venous pressure at 3 mmHg.    Current Heart Failure Medications  hydrALAZINE injection 10 mg, Every 4 hours PRN, Intravenous  labetalol 20 mg/4 mL (5 mg/mL) IV syring, Every 4 hours PRN, Intravenous  hydrALAZINE tablet 50 mg, Every 8 hours, Oral  lisinopriL tablet 40 mg, Daily, Oral    Plan  - Monitor strict I&Os every 4 hours and daily weights.    - On telemetry  - The patient's volume status is stable        Virgie Peacock PA-C  Neurosurgery  Dario Glover - Neurosurgery ([facility])

## 2024-12-04 NOTE — PT/OT/SLP PROGRESS
Speech Language Pathology Treatment    Patient Name:  Ca Coats   MRN:  [id number]  Admitting Diagnosis: Cerebral aneurysm    Recommendations:                 General Recommendations:  Dysphagia therapy, Speech/language therapy, and Cognitive-linguistic therapy  Diet recommendations:  Regular Diet - IDDSI Level 7, Liquid Diet Level: Thin liquids - IDDSI Level 0   Aspiration Precautions: 1 bite/sip at a time, Alternating bites/sips, Assistance with meals, Avoid talking while eating, Eliminate distractions, Feed only when awake/alert, HOB to 90 degrees, Meds whole 1 at a time, Small bites/sips, and Strict aspiration precautions   General Precautions: Standard, aspiration, fall  Communication strategies:  provide increased time to answer and go to room if call light pushed    Assessment:     Ca Coats is a 73 y.o. female with an SLP diagnosis of Aphasia, Dysphagia, and Cognitive-Linguistic Impairment. SLP will continue to follow.     Subjective     Pt awake and alert upon arrival. Pt agreeable to participate. Nursing cleared.    Pain/Comfort:  Pain Rating 1: 0/10  Pain Rating Post-Intervention 1: 0/10    Respiratory Status: Room air    Objective:     Has the patient been evaluated by SLP for swallowing?   Yes  Keep patient NPO? No   Current Respiratory Status:        Pt seen for ongoing speech and dysphagia therapy. Pt awake and alert. Pt noted to have continued white coated tongue. Pt administering trials of thin liquids via straw and bites of johnathan cracker. Adequate mastication with minimal oral residue that pt independently cleared with additional swallow and liquid wash. No overt signs of aspiration.   Pt appropriately responded to y/n and simple open ended questions but occasionally required repetitions. Pt oriented to self only. Pt completed automatic speech task of counting 1-10 but then perseverated on task for remainder of session. Pt unable to complete phrase completion task  despite max cues. Pt following simple 1 step commands with models provided. SLP provided education regarding role of SLP, aspiration precautions, safe swallow strategies, speech strategies, recommendations, and SLP POC. Pt expressed understanding but would benefit from reinforcement. SLP will continue to follow.     Goals:   Multidisciplinary Problems       SLP Goals          Problem: SLP    Goal Priority Disciplines Outcome   SLP Goal     SLP Progressing   Description: Speech Language Pathology Goals  Goals expected to be met by 12/5:  1) Pt will participate in ongoing assessment of swallow function to determine least restrictive diet.  2) Pt will participate in ongoing speech, language, cognitive evaluation to determine possible goals and poc.   3) Pt will respond to simple yes/no questions with 80% accy given min cues.  4) Pt will complete automatic speech tasks with 90% accy given min cues.  5) Pt will complete naming tasks with 80% accy given mod cues.  6) Pt will follow simple commands with 90% accy given mod cues.                                 Plan:     Patient to be seen:  4 x/week   Plan of Care expires:  12/22/24  Plan of Care reviewed with:  patient   SLP Follow-Up:  Yes       Discharge recommendations:  High Intensity Therapy   Barriers to Discharge:  Level of Skilled Assistance Needed      Time Tracking:     SLP Treatment Date:   12/04/24  Speech Start Time:  1052  Speech Stop Time:  1108     Speech Total Time (min):  16 min    Billable Minutes: Speech Therapy Individual 8 and Treatment Swallowing Dysfunction 8    12/04/2024

## 2024-12-04 NOTE — ASSESSMENT & PLAN NOTE
Latest ECHO  Results for orders placed during the hospital encounter of 10/22/24    Echo    Interpretation Summary    Left Ventricle: The left ventricle is normal in size. Normal wall thickness. There is normal systolic function with a visually estimated ejection fraction of 60 - 65%. There is normal diastolic function.    Right Ventricle: Normal right ventricular cavity size. Wall thickness is normal. Systolic function is normal.    Left Atrium: Left atrium is severely dilated.    Right Atrium: Right atrium is dilated.    Aortic Valve: The aortic valve is a trileaflet valve. There is moderate aortic valve sclerosis. Mildly restricted motion.    Tricuspid Valve: There is mild regurgitation.    IVC/SVC: Normal venous pressure at 3 mmHg.    Current Heart Failure Medications  hydrALAZINE injection 10 mg, Every 4 hours PRN, Intravenous  labetalol 20 mg/4 mL (5 mg/mL) IV syring, Every 4 hours PRN, Intravenous  hydrALAZINE tablet 50 mg, Every 8 hours, Oral  lisinopriL tablet 40 mg, Daily, Oral    Plan  - Monitor strict I&Os every 4 hours and daily weights.    - On telemetry  - The patient's volume status is stable

## 2024-12-05 ENCOUNTER — TELEPHONE (OUTPATIENT)
Dept: PAIN MEDICINE | Facility: CLINIC | Age: 74
End: 2024-12-05
Payer: MEDICARE

## 2024-12-05 LAB
ALBUMIN SERPL BCP-MCNC: 2.9 G/DL (ref 3.5–5.2)
ALP SERPL-CCNC: 99 U/L (ref 40–150)
ALT SERPL W/O P-5'-P-CCNC: 16 U/L (ref 10–44)
ANION GAP SERPL CALC-SCNC: 8 MMOL/L (ref 8–16)
AST SERPL-CCNC: 15 U/L (ref 10–40)
BASOPHILS # BLD AUTO: 0.05 K/UL (ref 0–0.2)
BASOPHILS NFR BLD: 0.5 % (ref 0–1.9)
BILIRUB SERPL-MCNC: 0.4 MG/DL (ref 0.1–1)
BUN SERPL-MCNC: 22 MG/DL (ref 8–23)
CALCIUM SERPL-MCNC: 9.8 MG/DL (ref 8.7–10.5)
CHLORIDE SERPL-SCNC: 114 MMOL/L (ref 95–110)
CO2 SERPL-SCNC: 17 MMOL/L (ref 23–29)
CREAT SERPL-MCNC: 1 MG/DL (ref 0.5–1.4)
DIFFERENTIAL METHOD BLD: ABNORMAL
EOSINOPHIL # BLD AUTO: 0.2 K/UL (ref 0–0.5)
EOSINOPHIL NFR BLD: 2.1 % (ref 0–8)
ERYTHROCYTE [DISTWIDTH] IN BLOOD BY AUTOMATED COUNT: 14.1 % (ref 11.5–14.5)
EST. GFR  (NO RACE VARIABLE): >60 ML/MIN/1.73 M^2
GLUCOSE SERPL-MCNC: 85 MG/DL (ref 70–110)
HCT VFR BLD AUTO: 30.3 % (ref 37–48.5)
HGB BLD-MCNC: 9.5 G/DL (ref 12–16)
IMM GRANULOCYTES # BLD AUTO: 0.04 K/UL (ref 0–0.04)
IMM GRANULOCYTES NFR BLD AUTO: 0.4 % (ref 0–0.5)
LYMPHOCYTES # BLD AUTO: 1.5 K/UL (ref 1–4.8)
LYMPHOCYTES NFR BLD: 14.8 % (ref 18–48)
MAGNESIUM SERPL-MCNC: 2.2 MG/DL (ref 1.6–2.6)
MCH RBC QN AUTO: 32.5 PG (ref 27–31)
MCHC RBC AUTO-ENTMCNC: 31.4 G/DL (ref 32–36)
MCV RBC AUTO: 104 FL (ref 82–98)
MONOCYTES # BLD AUTO: 1.3 K/UL (ref 0.3–1)
MONOCYTES NFR BLD: 12.8 % (ref 4–15)
NEUTROPHILS # BLD AUTO: 6.9 K/UL (ref 1.8–7.7)
NEUTROPHILS NFR BLD: 69.4 % (ref 38–73)
NRBC BLD-RTO: 0 /100 WBC
PHOSPHATE SERPL-MCNC: 2.4 MG/DL (ref 2.7–4.5)
PLATELET # BLD AUTO: 289 K/UL (ref 150–450)
PMV BLD AUTO: 11.3 FL (ref 9.2–12.9)
POTASSIUM SERPL-SCNC: 4.7 MMOL/L (ref 3.5–5.1)
PROT SERPL-MCNC: 6.1 G/DL (ref 6–8.4)
RBC # BLD AUTO: 2.92 M/UL (ref 4–5.4)
SODIUM SERPL-SCNC: 139 MMOL/L (ref 136–145)
WBC # BLD AUTO: 9.92 K/UL (ref 3.9–12.7)

## 2024-12-05 PROCEDURE — 11000001 HC ACUTE MED/SURG PRIVATE ROOM: Mod: HCNC

## 2024-12-05 PROCEDURE — 80053 COMPREHEN METABOLIC PANEL: CPT | Mod: HCNC | Performed by: PHYSICIAN ASSISTANT

## 2024-12-05 PROCEDURE — 97110 THERAPEUTIC EXERCISES: CPT | Mod: HCNC

## 2024-12-05 PROCEDURE — 83735 ASSAY OF MAGNESIUM: CPT | Mod: HCNC | Performed by: PHYSICIAN ASSISTANT

## 2024-12-05 PROCEDURE — 85025 COMPLETE CBC W/AUTO DIFF WBC: CPT | Mod: HCNC | Performed by: PHYSICIAN ASSISTANT

## 2024-12-05 PROCEDURE — 92507 TX SP LANG VOICE COMM INDIV: CPT | Mod: HCNC

## 2024-12-05 PROCEDURE — 51798 US URINE CAPACITY MEASURE: CPT | Mod: HCNC

## 2024-12-05 PROCEDURE — 92526 ORAL FUNCTION THERAPY: CPT | Mod: HCNC

## 2024-12-05 PROCEDURE — 63600175 PHARM REV CODE 636 W HCPCS: Mod: HCNC | Performed by: NURSE PRACTITIONER

## 2024-12-05 PROCEDURE — 97530 THERAPEUTIC ACTIVITIES: CPT | Mod: HCNC

## 2024-12-05 PROCEDURE — 36415 COLL VENOUS BLD VENIPUNCTURE: CPT | Mod: HCNC | Performed by: PHYSICIAN ASSISTANT

## 2024-12-05 PROCEDURE — 25000003 PHARM REV CODE 250: Mod: HCNC | Performed by: PHYSICIAN ASSISTANT

## 2024-12-05 PROCEDURE — 25000003 PHARM REV CODE 250: Mod: HCNC | Performed by: STUDENT IN AN ORGANIZED HEALTH CARE EDUCATION/TRAINING PROGRAM

## 2024-12-05 PROCEDURE — 84100 ASSAY OF PHOSPHORUS: CPT | Mod: HCNC | Performed by: PHYSICIAN ASSISTANT

## 2024-12-05 RX ADMIN — GABAPENTIN 600 MG: 250 SOLUTION ORAL at 08:12

## 2024-12-05 RX ADMIN — SULFAMETHOXAZOLE AND TRIMETHOPRIM 1 TABLET: 800; 160 TABLET ORAL at 09:12

## 2024-12-05 RX ADMIN — SODIUM CHLORIDE 250 ML: 9 INJECTION, SOLUTION INTRAVENOUS at 05:12

## 2024-12-05 RX ADMIN — ATORVASTATIN CALCIUM 20 MG: 20 TABLET, FILM COATED ORAL at 08:12

## 2024-12-05 RX ADMIN — GABAPENTIN 600 MG: 250 SOLUTION ORAL at 07:12

## 2024-12-05 RX ADMIN — LEVETIRACETAM 1000 MG: 500 SOLUTION ORAL at 11:12

## 2024-12-05 RX ADMIN — HEPARIN SODIUM 7500 UNITS: 5000 INJECTION INTRAVENOUS; SUBCUTANEOUS at 09:12

## 2024-12-05 RX ADMIN — LISINOPRIL 40 MG: 20 TABLET ORAL at 09:12

## 2024-12-05 RX ADMIN — GABAPENTIN 600 MG: 250 SOLUTION ORAL at 02:12

## 2024-12-05 RX ADMIN — HEPARIN SODIUM 7500 UNITS: 5000 INJECTION INTRAVENOUS; SUBCUTANEOUS at 02:12

## 2024-12-05 RX ADMIN — ALUMINUM HYDROXIDE, MAGNESIUM HYDROXIDE, AND DIMETHICONE 10 ML: 400; 400; 40 SUSPENSION ORAL at 09:12

## 2024-12-05 RX ADMIN — HYDRALAZINE HYDROCHLORIDE 50 MG: 25 TABLET ORAL at 07:12

## 2024-12-05 RX ADMIN — ALUMINUM HYDROXIDE, MAGNESIUM HYDROXIDE, AND DIMETHICONE 10 ML: 400; 400; 40 SUSPENSION ORAL at 08:12

## 2024-12-05 RX ADMIN — CITALOPRAM HYDROBROMIDE 10 MG: 10 TABLET ORAL at 08:12

## 2024-12-05 RX ADMIN — HYDRALAZINE HYDROCHLORIDE 50 MG: 25 TABLET ORAL at 02:12

## 2024-12-05 RX ADMIN — ALUMINUM HYDROXIDE, MAGNESIUM HYDROXIDE, AND DIMETHICONE 10 ML: 400; 400; 40 SUSPENSION ORAL at 05:12

## 2024-12-05 RX ADMIN — SULFAMETHOXAZOLE AND TRIMETHOPRIM 1 TABLET: 800; 160 TABLET ORAL at 08:12

## 2024-12-05 RX ADMIN — HYDRALAZINE HYDROCHLORIDE 50 MG: 25 TABLET ORAL at 09:12

## 2024-12-05 RX ADMIN — HEPARIN SODIUM 7500 UNITS: 5000 INJECTION INTRAVENOUS; SUBCUTANEOUS at 07:12

## 2024-12-05 RX ADMIN — LEVETIRACETAM 1000 MG: 500 SOLUTION ORAL at 08:12

## 2024-12-05 RX ADMIN — ALUMINUM HYDROXIDE, MAGNESIUM HYDROXIDE, AND DIMETHICONE 10 ML: 400; 400; 40 SUSPENSION ORAL at 02:12

## 2024-12-05 NOTE — PLAN OF CARE
ABIGAIL assigned to pt today.   ABIGAIL reached out to PA to get an projected ZAIDA for pt.   9:58am: ABIGAIL reached out to [facility] [phone] to speak w/ Anita regarding referral. No answer, SW left VM. ABIGAIL reached out to [facility] team regarding completing LOCET and SW completed PASRR and [facility]ed over to [facility].   12:44pm: ABIGAIL reached out to Anita @ [facility] to inquire about pt acceptance. Anita stated they won't be able to accept pt due to pt weight exceed weight limit for lisbet lift. ABIGAIL reached out to Sarah w/ [facility] regarding avaialibility and SW will call other preferred facilities ([facility] and [facility]); SW uploaded pt 142 to pt [facility]. Sarah informed no beds currently and pt too low level.   1:15pm: ABIGAIL contacted [facility] [phone] and left [facility].   1:17pm: ABIGAIL reached out to [facility] [phone] and spk w/ Cheryl. She stated they won't have a bed until Monday and will review pt to see if pt medically appropriate.     Discharge Plan A and Plan B have been determined by review of patient's clinical status, future medical and therapeutic needs, and coverage/benefits for post-acute care in coordination with multidisciplinary team members.    Danay Mckeon, STEVEN   [facility]    Case Management Dept  [phone]

## 2024-12-05 NOTE — PROGRESS NOTES
"Dario Glover - Neurosurgery ([facility])  Adult Nutrition  Progress Note    SUMMARY       Recommendations    Continue regular diet as tolerated.   Add cardiac modifiers when appropriate.   Review bowel regimen - LBM 11/26.   RD to monitor and follow up.    Goals: Meet % EEN/EPN by RD follow up.  Nutrition Goal Status: progressing towards goal  Communication of RD Recs: other (comment) (POC)    Assessment and Plan    Nutrition Problem  Inadequate energy intake      Related to (etiology):   Clinical course      Signs and Symptoms (as evidenced by):   NPO status (pending SLP evaluation)     Interventions/Recommendations (treatment strategy):  Collab of care w other providers     Nutrition Diagnosis Status:   Resolved            Reason for Assessment    Reason For Assessment: RD follow-up  Diagnosis: other (see comments) (Cerebral aneurysm)  General Information Comments: TF discontinued 11/29. Currently on a regular diet, averaging 50% of consumed meals since last f/u. No N/V. Constipation noted 12/4.  Nutrition Discharge Planning: Cardiac diet    Nutrition Related Social Determinants of Health: SDOH: Unable to assess at this time.       Nutrition Risk Screen    Nutrition Risk Screen: no indicators present    Nutrition/Diet History    Spiritual, Cultural Beliefs, Anabaptism Practices, Values that Affect Care: no  Food Allergies: NKFA    Anthropometrics    Temp: 99.3 °F (37.4 °C)  Height Method: Stated  Height: 5' 6" (167.6 cm)  Height (inches): 66 in  Weight Method: Bed Scale  Weight: (!) 147.9 kg (326 lb)  Weight (lb): (!) 326 lb  Ideal Body Weight (IBW), Female: 130 lb  % Ideal Body Weight, Female (lb): 209.27 %  BMI (Calculated): 52.6  BMI Grade: greater than 40 - morbid obesity       Lab/Procedures/Meds    Pertinent Labs Reviewed: reviewed  Pertinent Labs Comments: Phosphorus 2.4, albumin 2.9  Pertinent Medications Reviewed: reviewed  Pertinent Medications Comments: Atorvastatin, duke's soln, heparin, " lisinopril        Estimated/Assessed Needs    Weight Used For Calorie Calculations: 59 kg (130 lb 1.1 oz)  Energy Calorie Requirements (kcal): 6054-4408 kcal/day (25-30 kcal/kg IBW)  Energy Need Method: Kcal/kg  Protein Requirements: 71-83 g/day (1.2-1.4 g/kg IBW)  Weight Used For Protein Calculations: 59 kg (130 lb 1.1 oz)  Fluid Requirements (mL): Per MD  Estimated Fluid Requirement Method: RDA Method  RDA Method (mL): 1475  CHO Requirement: 184-221 g/day      Nutrition Prescription Ordered    Current Diet Order: Regular diet (cut food small)  Current Nutrition Support Formula Ordered:  (Discontinued)  Current Nutrition Support Rate Ordered: 0 (ml)    Evaluation of Received Nutrient/Fluid Intake    Enteral Calories (kcal): 0  Enteral Protein (gm): 0  Enteral (Free Water) Fluid (mL): 0  Free Water Flush Fluid (mL): 0  I/O: - 879 net I/O  Comments: LBM 11/26  % Intake of Estimated Energy Needs: 50 - 75 %  % Meal Intake: 50 - 75 %    Nutrition Risk    Level of Risk/Frequency of Follow-up:  (1x/week)     Monitor and Evaluation    Food and Nutrient Intake: food and beverage intake, energy intake  Food and Nutrient Adminstration: diet order  Anthropometric Measurements: body mass index, weight change  Biochemical Data, Medical Tests and Procedures: lipid profile, inflammatory profile, glucose/endocrine profile, electrolyte and renal panel, gastrointestinal profile  Nutrition-Focused Physical Findings: overall appearance     Nutrition Follow-Up    RD Follow-up?: Yes    Gretchen Burton, Registration Eligible, Provisional LDN

## 2024-12-05 NOTE — PT/OT/SLP PROGRESS
Occupational Therapy  Co- Treatment with PT    Name: Ca Coats  MRN: [id number]  Admitting Diagnosis:  Cerebral aneurysm  15 Days Post-Op    Recommendations:     Discharge Recommendations: High Intensity Therapy  Discharge Equipment Recommendations:  hospital bed, lift device, wheelchair  Barriers to discharge:  Other (Comment) (skilled A required)    Assessment:     Ca Coats is a 73 y.o. female with a medical diagnosis of Cerebral aneurysm.  She presents with performance deficits affecting function are weakness, impaired balance, decreased safety awareness, impaired endurance, impaired cardiopulmonary response to activity, impaired self care skills, impaired functional mobility, gait instability, decreased lower extremity function, decreased upper extremity function, decreased coordination. Pt presents in bed, agreeable to tx, A & O to name only. Pt communication limited by aphasia but manages simple, one word statements and usees head shaking; she follows <50% of simple, one step directives but implies mobility cues well. She tolerates transitions to EOB well with ongoing need for heavy assist x, but demonstrates improved STS and tolerates lateral steps to R this date. She was returned to supine, and is able to boost self towards HOB without initiation cue with good advancement. Given tolerance and gains made, but mindful of deficits from baseline pt is a good candidate for high intensity tx at the post acute level to address deficits present impacting safe and effective participation and IND in Adls and functional mobility.     Rehab Prognosis:  Good; patient would benefit from acute skilled OT services to address these deficits and reach maximum level of function.       Plan:     Patient to be seen 4 x/week to address the above listed problems via self-care/home management, therapeutic activities, therapeutic exercises, neuromuscular re-education  Plan of Care Expires: 12/24/24  Plan  of Care Reviewed with: patient    Subjective     Chief Complaint: Unclear communication  Patient/Family Comments/goals: To gain IND  Pain/Comfort:  Pain Rating 1: 0/10  Pain Rating Post-Intervention 1: 0/10    Objective:   Co-treatment performed due to patient's multiple deficits requiring two skilled therapists to appropriately and safely assess patient's strength and endurance while facilitating functional tasks in addition to accommodating for patient's activity tolerance.      Communicated with: Nsg prior to session.  Patient found HOB elevated with PureWick, telemetry upon OT entry to room.    General Precautions: Standard, aspiration, fall    Orthopedic Precautions:N/A  Braces: N/A  Respiratory Status: Room air     Occupational Performance:     Bed Mobility:    Patient completed Scooting/Bridging with maximal assistance towards EOB, SBA towards HOB  Patient completed Supine to Sit with maximal assistance and 2 persons  Patient completed Sit to Supine with maximal assistance and 2 persons   Seated balance SBA t/o EOB    Functional Mobility/Transfers:  Patient completed Sit <> Stand Transfer with maximal assistance and of 2 persons  with  no assistive device   Functional Mobility: pt achieves full standing well, able to activate t/ glutes and LE for fairly upright posture. Pt tolerates standing balance and 4x R lateral steps towards HOB with modA x2 to facilitate weight shift and safely advance foot. See PT note for further gait details.     Activities of Daily Living:  Grooming: minimum assistance for thoroughness around eyes washing face with L UE  Upper Body Dressing: pt requires assistance to mange gown orientation        [facility] 6 Click ADL: 14    Treatment & Education:  Pt requires increased time and cues to safely and effectively d/t communication deficits and loud environmental barrier d/t air mattress sound level.     -Education on importance of OOB activity to improve overall activity tolerance and  promote recovery  -Pt educated to call for assistance with red call button  - Encouraged to sit with HOB  upright for hydration/eating after desired position presents with low HOB, agreeable    Pt had no further questions & when asked whether there were any concerns pt reported none.     Patient left HOB elevated with all lines intact and call button in reach    GOALS:   Multidisciplinary Problems       Occupational Therapy Goals          Problem: Occupational Therapy    Goal Priority Disciplines Outcome Interventions   Occupational Therapy Goal     OT, PT/OT Progressing    Description: Goals to be met by: 12/24/24     Patient will increase functional independence with ADLs by performing:    UE Dressing with Stand-by Assistance.  LE Dressing with Minimal Assistance.  Grooming while seated with Stand-by Assistance.  Toileting from toilet/bedside commode with Minimal Assistance for hygiene and clothing management.   Supine to sit with Minimal Assistance.  Toilet transfer to toilet/bedside commode with Minimal Assistance.  Eating with Modified independence    Patient requires a hospital bed for positioning of the body in ways that are not feasible with an ordinary bed. The patient requires special positioning for pain relief, limited mobility, and/or being unable to independently make changes in body position without the use of a hospital bed. Pillows and wedges will not be adequate for resolving these positional issues.      Patient has a mobility limitation that significantly impairs their ability to participate in one or more mobility related activities of daily living in customary locations in the home. The mobility limitation cannot be sufficiently resolved by the use of a cane or walker. The use of a manual wheelchair will greatly improve the patient's ability to participate in MRADLs. The patient will use the wheelchair on a regular basis at home. They have expressed their willingness to use a manual wheelchair  in the home, and have a caregiver who is available and willing to assist with the wheelchair if needed.                            Time Tracking:     OT Date of Treatment: 12/05/24  OT Start Time: 1320  OT Stop Time: 1341  OT Total Time (min): 21 min    Billable Minutes:Therapeutic Activity 21    OT/RICHARD: OT     Number of RICHARD visits since last OT visit: 1    12/5/2024

## 2024-12-05 NOTE — SUBJECTIVE & OBJECTIVE
Interval History: 12/5: NAEO. I/O - 230 overnight. Given 250 cc bolus today. Continue to monitor. TCD today pending. Slightly more drowsy on AM rounds. Repeat CTH done today, appears stable, read pending. Exam otherwise stable.     Medications:  Continuous Infusions:  Scheduled Meds:   atorvastatin  20 mg Oral Daily    citalopram  10 mg Oral Daily    duke's soln (benadryl 30 mL, mylanta 30 mL, LIDOcaine 30 mL, nystatin 30 mL) 120 mL  10 mL Oral QID    gabapentin  600 mg Oral Q8H    heparin (porcine)  7,500 Units Subcutaneous Q8H    hydrALAZINE  50 mg Oral Q8H    levetiracetam  1,000 mg Oral BID    lisinopriL  40 mg Oral Daily    sodium chloride 0.9%  250 mL Intravenous Once    sulfamethoxazole-trimethoprim 800-160mg  1 tablet Oral BID     PRN Meds:  Current Facility-Administered Medications:     acetaminophen, 650 mg, Oral, Q4H PRN    bisacodyL, 10 mg, Rectal, Daily PRN    hydrALAZINE, 10 mg, Intravenous, Q4H PRN    labetalol, 10 mg, Intravenous, Q4H PRN    morphine, 2 mg, Intravenous, Q6H PRN    naloxone, 0.4 mg, Intravenous, PRN    ondansetron, 4 mg, Intravenous, Q12H PRN    oxyCODONE, 5 mg, Oral, Q6H PRN    sodium chloride 0.9%, 10 mL, Intravenous, PRN     Objective:     Weight: (!) 147.9 kg (326 lb)  Body mass index is 52.62 kg/m².  Vital Signs (Most Recent):  Temp: 99.3 °F (37.4 °C) (12/05/24 1158)  Pulse: 71 (12/05/24 1511)  Resp: 18 (12/05/24 1158)  BP: (!) 117/53 (12/05/24 1158)  SpO2: (!) 94 % (12/05/24 1158) Vital Signs (24h Range):  Temp:  [97.7 °F (36.5 °C)-99.3 °F (37.4 °C)] 99.3 °F (37.4 °C)  Pulse:  [68-87] 71  Resp:  [16-18] 18  SpO2:  [93 %-97 %] 94 %  BP: (110-120)/(52-58) 117/53                         Female External Urinary Catheter w/ Suction 11/21/24 1400 (Active)   Skin no redness;no breakdown 12/05/24 0800   Tolerance no signs/symptoms of discomfort 12/05/24 0800   Suction Continuous suction at 70 mmHg 12/05/24 0800   Date of last wick change 12/05/24 12/05/24 0800   Time of last wick change  "0800 12/05/24 0800   Output (mL) 350 mL 12/04/24 1948     Neurosurgery Physical Exam  General: well developed, well nourished, no distress.   Head: normocephalic, cranial incision c/d/I with chromic suture  Mental Status: Awake, Alert, Oriented x 1  Speech: Clear with content appropriate to conversation, mild aphasia  Cranial nerves: face symmetric, CN II-XII grossly intact.   Eyes: pupils equal, round, reactive to light, EOMI.  Sensory: intact to light touch throughout  Motor Strength: Moves all extremities spontaneously with good tone. At least antigravity strength in upper and lower extremities. No focal weakness identified. No abnormal movements seen.   Follows commands x 4 extremities  Skin: Skin is warm, dry and intact.    Significant Labs:  Recent Labs   Lab 12/05/24  0245   GLU 85      K 4.7   *   CO2 17*   BUN 22   CREATININE 1.0   CALCIUM 9.8   MG 2.2     Recent Labs   Lab 12/05/24  0245   WBC 9.92   HGB 9.5*   HCT 30.3*        No results for input(s): "LABPT", "INR", "APTT" in the last 48 hours.  Microbiology Results (last 7 days)       ** No results found for the last 168 hours. **          All pertinent labs from the last 24 hours have been reviewed.    Significant Diagnostics:  I have reviewed and interpreted all pertinent imaging results/findings within the past 24 hours.  "

## 2024-12-05 NOTE — PLAN OF CARE
Problem: Adult Inpatient Plan of Care  Goal: Plan of Care Review  Outcome: Progressing  Goal: Patient-Specific Goal (Individualized)  Outcome: Progressing  Goal: Absence of Hospital-Acquired Illness or Injury  Outcome: Progressing     Problem: Wound  Goal: Optimal Coping  Outcome: Progressing  Goal: Optimal Functional Ability  Outcome: Progressing     Problem: Infection  Goal: Absence of Infection Signs and Symptoms  Outcome: Progressing     Problem: Fall Injury Risk  Goal: Absence of Fall and Fall-Related Injury  Outcome: Progressing     Problem: Skin Injury Risk Increased  Goal: Skin Health and Integrity  Outcome: Progressing

## 2024-12-05 NOTE — PLAN OF CARE
Problem: Adult Inpatient Plan of Care  Goal: Plan of Care Review  Outcome: Progressing  Goal: Patient-Specific Goal (Individualized)  Outcome: Progressing     Problem: Bariatric Environmental Safety  Goal: Safety Maintained with Care  Outcome: Progressing     Problem: Wound  Goal: Improved Oral Intake  Outcome: Progressing  Goal: Optimal Pain Control and Function  Outcome: Progressing  Goal: Skin Health and Integrity  Outcome: Progressing

## 2024-12-05 NOTE — PT/OT/SLP PROGRESS
Physical Therapy Co-Treatment with OT    Patient Name:  Ca Coats   MRN:  [id number]    Recent Surgery: Procedure(s) (LRB):  CRANIOTOMY, WITH ANEURYSM CLIPPING W/ STEALTH (Left)  CRANIOTOMY, FOR ANEURYSM OF VERTEBROBASILAR OR CAROTID CIRCULATION (Left)  DISSECTION, NERVE, USING OPERATING MICROSCOPE (Left) 15 Days Post-Op    Patient required co-tx with OT secondary to need for multiple set of skilled hands to provide safest therapy and best outcomes.      Recommendations:     Discharge Recommendations:    High intensity therapy  Discharge Equipment Recommendations: hospital bed, lift device, wheelchair   Barriers to discharge: Increased level of assist    Highest Level of Mobility: Gait 4 lateral steps along EOB  Assistance Required: Mod(A)X2 persons with HHAx2    Assessment:     Ca Coats is a 73 y.o. female admitted with a medical diagnosis of Cerebral aneurysm.    Pt met with HOB elevated and agreeable to PT treatment. Today's PT treatment focus was on continued transfer training and initiation of gait  to improve function. Pt is A&Ox1, but follows most simple commands appropriately. Pt has progressed significantly with mobility today and was able to take lateral steps along EOB with mod(A)X2 persons.     Pt is progressing towards acute PT goals appropriately and continues to benefit from acute PT sessions.     Rehab Prognosis: Good; patient would benefit from acute skilled PT services to address these deficits and reach maximum level of function.      Plan:     During this hospitalization, patient to be seen 4 x/week to address the identified rehab impairments via gait training, therapeutic activities, therapeutic exercises, neuromuscular re-education and progress toward the following goals:    Plan of Care Expires:  12/24/24    This plan of care has been discussed with the patient/caregiver, who was included in its development and is in agreement with the identified goals and treatment  plan.     Subjective     Communicated with RN prior to session.  Patient agreeable to participate.     Pain/Comfort:  Pain Rating 1: 0/10  Pain Rating Post-Intervention 1: 0/10    Chief Complaint: Cerebral aneurysm   Patient/Family Comments/goals: None stated      Objective:     Patient found HOB elevated with telemetry, PureWick  upon PT entry to room.    General Precautions: Standard, fall   Orthopedic Precautions:N/A   Braces: N/A         Exams:    Cognition:  Patient is oriented to Person  Follows one-step commands  Insight to deficits/safety awareness: impaired    Functional Mobility:    Bed Mobility:  Supine to Sit: Maximum Assistance and 2 persons  on R side of bed  Sit to Supine: Maximum Assistance and 2 persons  Scooting anteriorly to EOB to plant feet on floor: Maximum Assistance    Transfers:   Sit to Stand Transfer: Maximum Assistance and 2 persons   from EOB with HHAx2   100% clearance             Gait:  Patient received gait training 4 lateral steps with Moderate Assistance and 2 persons  and  HHAx2  Gait Assessment: unsteady gait, narrow base of support, flexed posture, and decreased francesca  Gait Pattern Observed: Step-to  Comments: All lines remained intact throughout ambulation trial, gait belt utilized. PT facilitated weight shift to assist with B LE advancement    Balance:  Static Sit:   Stand-By Assist at EOB   Static Stand:   Mod(A)X2  with Hand-held assist x 2  Dynamic Stand:  Mod(A)X2  with Hand-held assist x 2      Therapeutic Activities/Exercises     Patient assisted with functional mobility as noted above  Patient educated on the importance of early mobility to prevent functional decline during hospital stay  Patient was instructed to utilize staff assistance for mobility/transfers.  Patient is appropriate to transfer with dependence to medichair via drawsheet and RN/PCT assist  Patient educated on PT POC and role of PT in acute care  White board updated regarding patient's safest level of  mobility with staff assistance, RN also updated.     AM-PAC 6 CLICK MOBILITY  Turning over in bed (including adjusting bedclothes, sheets and blankets)?: 2  Sitting down on and standing up from a chair with arms (e.g., wheelchair, bedside commode, etc.): 2  Moving from lying on back to sitting on the side of the bed?: 2  Moving to and from a bed to a chair (including a wheelchair)?: 2  Need to walk in hospital room?: 2  Climbing 3-5 steps with a railing?: 1  Basic Mobility Total Score: 11     Patient left HOB elevated with all lines intact, call button in reach, bed alarm on, and RN notified.        History/Goals:     PAST MEDICAL HISTORY:  Past Medical History:   Diagnosis Date    YOUNG (acute kidney injury) 10/15/2021    Anemia     2018    Arthritis     BMI 60.0-69.9, adult     Cataract     Chronic anticoagulation 11/27/2024    DVT in currently on Eliquis     Chronic diastolic congestive heart failure 01/27/2021    Coronary artery disease involving native coronary artery of native heart without angina pectoris 11/15/2024    Deep vein thrombosis     Depression     Dry eye syndrome     DVT of deep femoral vein, right 05/29/2023    Gastric adenocarcinoma 05/25/2021    GERD (gastroesophageal reflux disease)     Glaucoma suspect     History of gastric ulcer     History of psychiatric hospitalization     Hx of psychiatric care     Celexa, no recall of charted Effexor, Lexapro, Desyrel prescriptions    Hyperlipidemia     Hypertension     Hypothyroidism     Morbid obesity     Neuromuscular disorder     Sleep apnea     Thyroid disease        Past Surgical History:   Procedure Laterality Date    APPENDECTOMY      CATARACT EXTRACTION W/  INTRAOCULAR LENS IMPLANT Bilateral     MFIOL OU    CLIP LIGATION OF INTRACRANIAL ANEURYSM BY CRANIOTOMY Left 11/20/2024    Procedure: CRANIOTOMY, WITH ANEURYSM CLIPPING W/ STEALTH;  Surgeon: Sejal Simon MD;  Location: [facility] OR [address] [place] [address];  Service: Neurosurgery;  Laterality: Left;  left  pterioral craniotomy with microsurgical clip ligation of mca and anterior chroidal aneurysm, dr. ginna hopkins co surgeon, scalp block, type and cross 2 units, neuromonitoring sep,mep,eeg, regular bed, desouaz, supine spencer, icu pos    CORONARY ANGIOGRAPHY Bilateral 02/24/2021    Procedure: ANGIOGRAM, CORONARY ARTERY;  Surgeon: Cresencio Ridley MD;  Location: [facility] CATH LAB;  Service: Cardiology;  Laterality: Bilateral;  Covid test needed - ok per Danay SSCU. Pt. w/o transportation or family    CRANIOTOMY, FOR ANEURYSM OF VERTEBROBASILAR OR CAROTID CIRCULATION Left 11/20/2024    Procedure: CRANIOTOMY, FOR ANEURYSM OF VERTEBROBASILAR OR CAROTID CIRCULATION;  Surgeon: Sejal Simon MD;  [facility];  Service: Neurosurgery;  Laterality: Left;  with scalp block    DISSECTION, NERVE, USING OPERATING MICROSCOPE Left 11/20/2024    Procedure: DISSECTION, NERVE, USING OPERATING MICROSCOPE;  Surgeon: Sejal Simon MD;  [facility];  Service: Neurosurgery;  Laterality: Left;  with scalp block    ENDOSCOPIC ULTRASOUND OF UPPER GASTROINTESTINAL TRACT N/A 03/17/2021    Procedure: ULTRASOUND, UPPER GI TRACT, ENDOSCOPIC;  Surgeon: Gerald Anaya MD;  [facility]  Service: Endoscopy;  Laterality: N/A;  Pt unable to get a ride for swab. Will do rapid test at 8:30 - ttr    ENDOSCOPIC ULTRASOUND OF UPPER GASTROINTESTINAL TRACT N/A 01/13/2022    Procedure: ULTRASOUND, UPPER GI TRACT, ENDOSCOPIC;  Surgeon: Kevin Carballo MD;  [facility]  Service: Endoscopy;  Laterality: N/A;  blood thinner approval received, see telephone encounter 1/7/22-BB  rapid  instructions given verbally and sent to address on file in pt's chart-BB    ENDOSCOPIC ULTRASOUND OF UPPER GASTROINTESTINAL TRACT N/A 10/11/2022    Procedure: ULTRASOUND, UPPER GI TRACT, ENDOSCOPIC;  Surgeon: Dequan Ridley MD;  Location: [facility] ENDO (2ND FLR);  Service: Endoscopy;  Laterality: N/A;     ENDOSCOPIC ULTRASOUND OF UPPER GASTROINTESTINAL TRACT N/A 01/24/2024    Procedure: ULTRASOUND, UPPER GI TRACT, ENDOSCOPIC;  Surgeon: Kevin Carballo MD;  Location: [facility] ENDO (2ND FLR);  Service: Endoscopy;  Laterality: N/A;    ENDOSCOPIC ULTRASOUND OF UPPER GASTROINTESTINAL TRACT N/A 03/05/2024    Procedure: ULTRASOUND, UPPER GI TRACT, ENDOSCOPIC;  Surgeon: Kevin Carballo MD;  Location: [facility] ENDO (2ND FLR);  Service: Endoscopy;  Laterality: N/A;  1/25 portal instr-Repeat theEUS at the next available appointment because the preparation was poor. 48hrs of liquid. Ozempic 7 day hold-eliquis approved Dr. Pelaez TE 12/12/2023-tt  2/28-precall complete-pt verbalized udnerstanding of holding Oz    ESOPHAGOGASTRODUODENOSCOPY N/A 02/05/2021    Procedure: EGD (ESOPHAGOGASTRODUODENOSCOPY);  Surgeon: Matthew Hernadez MD;  Location: [facility] (2ND FLR);  Service: Endoscopy;  Laterality: N/A;  BMI-58    Wt: 361#     COVID test at [facility] on 2/2-GT    ESOPHAGOGASTRODUODENOSCOPY N/A 03/17/2021    Procedure: EGD (ESOPHAGOGASTRODUODENOSCOPY);  Surgeon: Gerald Anaya MD;  Location: [facility] (2ND FLR);  Service: Endoscopy;  Laterality: N/A;    ESOPHAGOGASTRODUODENOSCOPY N/A 05/25/2021    Procedure: EGD (ESOPHAGOGASTRODUODENOSCOPY);  Surgeon: Kevin Carballo MD;  Location: [facility] (2ND FLR);  Service: Endoscopy;  Laterality: N/A;  ESD 2 hours  Full COVID vaccination on file. ttr    ESOPHAGOGASTRODUODENOSCOPY N/A 01/13/2022    Procedure: EGD (ESOPHAGOGASTRODUODENOSCOPY);  Surgeon: eKvin Carballo MD;  Location: [facility] ENDO (2ND FLR);  Service: Endoscopy;  Laterality: N/A;  blood thinner approval, see telephone encounter 1/7/22-BB  rapid  instructions given verbally and sent to address on file in pt's chart-BB    ESOPHAGOGASTRODUODENOSCOPY N/A 10/11/2022    Procedure: EGD (ESOPHAGOGASTRODUODENOSCOPY);  Surgeon: Dequan Ridley MD;  Location: [facility] ([address] [place] [address]);  Service: Endoscopy;  Laterality: N/A;  She is do for EGD/EUS  now. Personal history of gastric cancer. Ca Coats #[id number].   Thanks,   Kevin Fuentes MD    Pt is fully vaccinated-DS  9/14/22-Approval to hold Eliquis rec'd from Dr. Pelaez (see telephone encounter 9/14/22)-DS  9/14/22-Ins    ESOPHAGOGASTRODUODENOSCOPY N/A 01/24/2024    Procedure: EGD (ESOPHAGOGASTRODUODENOSCOPY);  Surgeon: Kevin Carballo MD;  Location: [facility] (2ND FLR);  Service: Endoscopy;  Laterality: N/A;  12/8/23: instructions sent via portal. eliquis approval from MD Pelaez in telephone encounter (12/12/23) -GD  1/9-precall complete-MS    ESOPHAGOGASTRODUODENOSCOPY N/A 03/05/2024    Procedure: EGD (ESOPHAGOGASTRODUODENOSCOPY);  Surgeon: Kevin Carballo MD;  Location: [facility] ENDO (2ND FLR);  Service: Endoscopy;  Laterality: N/A;    EYE SURGERY      HYSTEROSCOPIC POLYPECTOMY OF UTERUS  01/10/2024    Procedure: POLYPECTOMY, UTERUS, HYSTEROSCOPIC;  Surgeon: Pina Pickens MD;  Location: [facility] OR;  Service: OB/GYN;;    HYSTEROSCOPY WITH DILATION AND CURETTAGE OF UTERUS N/A 01/10/2024    Procedure: HYSTEROSCOPY, WITH DILATION AND CURETTAGE OF UTERUS;  Surgeon: Pina Pickens MD;  Location: [facility] OR;  Service: OB/GYN;  Laterality: N/A;    INJECTION OF ANESTHETIC AGENT AROUND NERVE Left 07/10/2018    Procedure: BLOCK, NERVE;  Surgeon: Samantha Vidal MD;  Location: [facility] PAIN MGT;  Service: Pain Management;  Laterality: Left;  Genicular     INJECTION OF ANESTHETIC AGENT AROUND NERVE Left 07/19/2019    Procedure: Block, Nerve NERVE BLOCK GENICULAR WITH PHENOL 6%;  Surgeon: Samantha Vidal MD;  Location: [facility] PAIN MGT;  Service: Pain Management;  Laterality: Left;  NEEDS CONSENT    INSERTION OF TUNNELED CENTRAL VENOUS CATHETER (CVC) WITH SUBCUTANEOUS PORT N/A 07/09/2021    Procedure: INSERTION, PORT-A-CATH;  Surgeon: Mario Paz MD;  Location: [facility] CATH LAB;  Service: Radiology;  Laterality: N/A;  PORT PLACEMENT    RADIOFREQUENCY ABLATION Left 06/19/2020    Procedure: RADIOFREQUENCY  ABLATION LEFT GENICULAR;  Surgeon: Tevin Clark MD;  Location: [facility] PAIN MGT;  Service: Pain Management;  Laterality: Left;  Left Genicular RFA    RADIOFREQUENCY ABLATION Left 2021    Procedure: RADIOFREQUENCY ABLATION LEFT GENICULAR;  Surgeon: Tevin Clark MD;  Location: [facility] PAIN MGT;  Service: Pain Management;  Laterality: Left;    RADIOFREQUENCY ABLATION Left 2021    Procedure: RADIOFREQUENCY ABLATION, GENICULAR COOLED NEED CONSENT;  Surgeon: Tevin Clark MD;  Location: [facility] PAIN MGT;  Service: Pain Management;  Laterality: Left;    RADIOFREQUENCY ABLATION Left 2022    Procedure: RADIOFREQUENCY ABLATION, GENICULAR COOLED , LEFT;  Surgeon: Tevin Clark MD;  Location: [facility] PAIN MGT;  Service: Pain Management;  Laterality: Left;    RADIOFREQUENCY ABLATION Left 2022    Procedure: RADIOFREQUENCY ABLATION LEFT GENICULAR COOLED CLEARED TO HOLD ELIQUIS 3 DAYS  NEEDS CONSENT;  Surgeon: Tevin Clark MD;  Location: [facility] PAIN MGT;  Service: Pain Management;  Laterality: Left;    TONSILLECTOMY         GOALS:   Multidisciplinary Problems       Physical Therapy Goals          Problem: Physical Therapy    Goal Priority Disciplines Outcome Interventions   Physical Therapy Goal     PT, PT/OT Progressing    Description: Goals to be met by: 2024     Patient will increase functional independence with mobility by performin. Supine to sit with MInimal Assistance  2. Sit to supine with Minimal Assistance  3. Sit to stand transfer with Moderate Assistance  4. Bed to chair transfer with Maximum Assistance using LRAD  5. Gait  x 5 feet with Maximum Assistance using LRAD.   6. Sitting at edge of bed x5 minutes with Stand-by Assistance  7. Lower extremity exercise program x15 reps per handout, with assistance as needed    Hospital Bed - Patient requires a hospital bed for positioning of the body in ways that are not feasible with an ordinary bed. The patient requires special  positioning for pain relief, limited mobility, and/or being unable to independently make changes in body position without the use of a hospital bed. Pillows and wedges will not be adequate for resolving these positional issues.     Wheelchair - Patient has a mobility limitation that significantly impairs their ability to participate in one or more mobility related activities of daily living in customary locations in the home. The mobility limitation cannot be sufficiently resolved by the use of a cane or walker. The use of a manual wheelchair will greatly improve the patient's ability to participate in MRADLs. The patient will use the wheelchair on a regular basis at home. They have expressed their willingness to use a manual wheelchair in the home, and have a caregiver who is available and willing to assist with the wheelchair if needed                         Time Tracking:     PT Received On: 12/05/24  PT Start Time: 1320     PT Stop Time: 1341  PT Total Time (min): 21 min     Billable Minutes: Therapeutic Exercise 21      Leny Bhatia, PT  12/05/2024  Pager# [phone]

## 2024-12-05 NOTE — ASSESSMENT & PLAN NOTE
Isabel Coats is a 73-year-old female past medical history of hypertension, obstructive sleep apnea on CPAP, suggestive heart failure, obesity, history of gastric cancer status post chemoradiation and DVT in currently on Eliquis. She presents to [facility] s/p L pterional crani on 11/20 with 7 clips applied in proximity to large irregular L MCA bifurcation aneurysm and other adjacent small aneurysms, 1 clip applied across neck of aneurysm just proximal to anterior choroidal.  After surgery she seemed to be gradually waking up appropriately overnight, then AM of POD1 around shift change had an unexplained decline in exam during which stat CT showed IPH in the right side of her cerebellum. She became less responsive as well as developed a L gaze preference and dense aphasia. She had a perfusion scan with questionable perfusion deficit to the left anterior temporal lobe and milrinone was started with seemingly some clinical improvement. Now gaze preference resolved, following commands x4 antigravity, able to state name but still remains aphasic.    Imaging:  CTA Head 11/20: expected post-op changes  CTH 11/21 8 AM: right sided cerebellar IPH  CTA 11/21 10 AM: stable IPH, possible frontal contusion  CTP 11/21 4 PM: possible left anterior temporal perfusion deficit in territory of branches distal to MCA clips   MRI brain w wo 11/22: no strokes, post op changes, stable cerebellar bleed. some edema in right frontal lobe.    Plans:  -Downgraded to NPU under NSGY  -q4h neurochecks/vitals  -SBP <160  -drain removed 11/25  -keppra 1g BID postop. cEEG negative during initial AMS POD1  -Cerebral vasospasm: weaned off milrinone prior to step down  - Continue daily TCDs x 14 days - completed. Repeat TCD ordered for 12/5.  - Keep euvolemic.   - Strict I/O. Please document I/O every 4 hours.   -PT/OT/OOB  -SLP - tolerating current diet but with poor intake. Continue SLP. Encourage PO fluids since NGT removed 11/28. Diet advanced to  regular diet.   -Continue nutritional support with boost supplementation  -Continue to monitor clinically, notify NSGY immediately with any changes in neuro status   -SQH for DVT ppx    Dispo: pending placement to [facility]    Case and plan discussed with attending neurosurgeon Dr. Friedman by NSGY staff

## 2024-12-05 NOTE — PLAN OF CARE
Recommendations    Continue regular diet as tolerated.   Add cardiac modifiers when appropriate.   Review bowel regimen - LBM 11/26.   RD to monitor and follow up.    Goals: Meet % EEN/EPN by RD follow up.  Nutrition Goal Status: progressing towards goal  Communication of RD Recs: other (comment) (POC)

## 2024-12-05 NOTE — PROGRESS NOTES
Dario Glover - Neurosurgery ([facility])  Neurosurgery  Progress Note    Subjective:     History of Present Illness: Isabel Coats is a 73-year-old female past medical history of hypertension, obstructive sleep apnea on CPAP, suggestive heart failure, obesity, history of gastric cancer status post chemoradiation and DVT in currently on Eliquis. She presents to [facility] L pterional crani, 7 clips applied in proximity to large irregular L MCA bifurcation aneurysm and other adjacent small aneurysms, 1 clip applied across neck of aneurysm just proximal to anterior choroidal.  She also has a current smoker she was initially referred to Neurology for workup for dizziness. She had been complaining of vertigo multiple times a day for the past several months, associated with shortness of breath, palpitation, nausea, headache, and feeling faint. She is admitted to [facility] for a higher level of care.     Post-Op Info:  Procedure(s) (LRB):  CRANIOTOMY, WITH ANEURYSM CLIPPING W/ STEALTH (Left)  CRANIOTOMY, FOR ANEURYSM OF VERTEBROBASILAR OR CAROTID CIRCULATION (Left)  DISSECTION, NERVE, USING OPERATING MICROSCOPE (Left)   15 Days Post-Op   Interval History: 12/5: NAEO. I/O - 230 overnight. Given 250 cc bolus today. Continue to monitor. TCD today pending. Slightly more drowsy on AM rounds. Repeat CTH done today, appears stable, read pending. Exam otherwise stable.     Medications:  Continuous Infusions:  Scheduled Meds:   atorvastatin  20 mg Oral Daily    citalopram  10 mg Oral Daily    duke's soln (benadryl 30 mL, mylanta 30 mL, LIDOcaine 30 mL, nystatin 30 mL) 120 mL  10 mL Oral QID    gabapentin  600 mg Oral Q8H    heparin (porcine)  7,500 Units Subcutaneous Q8H    hydrALAZINE  50 mg Oral Q8H    levetiracetam  1,000 mg Oral BID    lisinopriL  40 mg Oral Daily    sodium chloride 0.9%  250 mL Intravenous Once    sulfamethoxazole-trimethoprim 800-160mg  1 tablet Oral BID     PRN Meds:  Current Facility-Administered Medications:      acetaminophen, 650 mg, Oral, Q4H PRN    bisacodyL, 10 mg, Rectal, Daily PRN    hydrALAZINE, 10 mg, Intravenous, Q4H PRN    labetalol, 10 mg, Intravenous, Q4H PRN    morphine, 2 mg, Intravenous, Q6H PRN    naloxone, 0.4 mg, Intravenous, PRN    ondansetron, 4 mg, Intravenous, Q12H PRN    oxyCODONE, 5 mg, Oral, Q6H PRN    sodium chloride 0.9%, 10 mL, Intravenous, PRN     Objective:     Weight: (!) 147.9 kg (326 lb)  Body mass index is 52.62 kg/m².  Vital Signs (Most Recent):  Temp: 99.3 °F (37.4 °C) (12/05/24 1158)  Pulse: 71 (12/05/24 1511)  Resp: 18 (12/05/24 1158)  BP: (!) 117/53 (12/05/24 1158)  SpO2: (!) 94 % (12/05/24 1158) Vital Signs (24h Range):  Temp:  [97.7 °F (36.5 °C)-99.3 °F (37.4 °C)] 99.3 °F (37.4 °C)  Pulse:  [68-87] 71  Resp:  [16-18] 18  SpO2:  [93 %-97 %] 94 %  BP: (110-120)/(52-58) 117/53                         Female External Urinary Catheter w/ Suction 11/21/24 1400 (Active)   Skin no redness;no breakdown 12/05/24 0800   Tolerance no signs/symptoms of discomfort 12/05/24 0800   Suction Continuous suction at 70 mmHg 12/05/24 0800   Date of last wick change 12/05/24 12/05/24 0800   Time of last wick change 0800 12/05/24 0800   Output (mL) 350 mL 12/04/24 1948     Neurosurgery Physical Exam  General: well developed, well nourished, no distress.   Head: normocephalic, cranial incision c/d/I with chromic suture  Mental Status: Awake, Alert, Oriented x 1  Speech: Clear with content appropriate to conversation, mild aphasia  Cranial nerves: face symmetric, CN II-XII grossly intact.   Eyes: pupils equal, round, reactive to light, EOMI.  Sensory: intact to light touch throughout  Motor Strength: Moves all extremities spontaneously with good tone. At least antigravity strength in upper and lower extremities. No focal weakness identified. No abnormal movements seen.   Follows commands x 4 extremities  Skin: Skin is warm, dry and intact.    Significant Labs:  Recent Labs   Lab 12/05/24  0245   GLU 85   NA  "139   K 4.7   *   CO2 17*   BUN 22   CREATININE 1.0   CALCIUM 9.8   MG 2.2     Recent Labs   Lab 12/05/24  0245   WBC 9.92   HGB 9.5*   HCT 30.3*        No results for input(s): "LABPT", "INR", "APTT" in the last 48 hours.  Microbiology Results (last 7 days)       ** No results found for the last 168 hours. **          All pertinent labs from the last 24 hours have been reviewed.    Significant Diagnostics:  I have reviewed and interpreted all pertinent imaging results/findings within the past 24 hours.  Assessment/Plan:     * Cerebral aneurysm  Isabel Coats is a 73-year-old female past medical history of hypertension, obstructive sleep apnea on CPAP, suggestive heart failure, obesity, history of gastric cancer status post chemoradiation and DVT in currently on Eliquis. She presents to [facility] s/p L pterional crani on 11/20 with 7 clips applied in proximity to large irregular L MCA bifurcation aneurysm and other adjacent small aneurysms, 1 clip applied across neck of aneurysm just proximal to anterior choroidal.  After surgery she seemed to be gradually waking up appropriately overnight, then AM of POD1 around shift change had an unexplained decline in exam during which stat CT showed IPH in the right side of her cerebellum. She became less responsive as well as developed a L gaze preference and dense aphasia. She had a perfusion scan with questionable perfusion deficit to the left anterior temporal lobe and milrinone was started with seemingly some clinical improvement. Now gaze preference resolved, following commands x4 antigravity, able to state name but still remains aphasic.    Imaging:  CTA Head 11/20: expected post-op changes  CTH 11/21 8 AM: right sided cerebellar IPH  CTA 11/21 10 AM: stable IPH, possible frontal contusion  CTP 11/21 4 PM: possible left anterior temporal perfusion deficit in territory of branches distal to MCA clips   MRI brain w wo 11/22: no strokes, post op changes, stable " cerebellar bleed. some edema in right frontal lobe.    Plans:  -Downgraded to NPU under NSGY  -q4h neurochecks/vitals  -SBP <160  -drain removed 11/25  -keppra 1g BID postop. cEEG negative during initial AMS POD1  -Cerebral vasospasm: weaned off milrinone prior to step down  - Continue daily TCDs x 14 days - completed. Repeat TCD ordered for 12/5.  - Keep euvolemic.   - Strict I/O. Please document I/O every 4 hours.   -PT/OT/OOB  -SLP - tolerating current diet but with poor intake. Continue SLP. Encourage PO fluids since NGT removed 11/28. Diet advanced to regular diet.   -Continue nutritional support with boost supplementation  -Continue to monitor clinically, notify NSGY immediately with any changes in neuro status   -SQH for DVT ppx    Dispo: pending [facility] to [facility]    Case and plan discussed with attending neurosurgeon Dr. Friedman by NSGY staff    Oral thrush  - Dukes solution ordered QID  - Continue oral care     Abscess  Small R inner thigh abscess noted with seropurulent drainage.  Gen Surg consulted 11/29:  This is spontaneously and adequately draining. No significant fluctuance or induration.     -No surgical intervention indicated or recommended at this time as this area is draining well  -CTM. Wound Care following.  -Bactrim started 11/29, continue x 7 days    Pure hypercholesterolemia  - home lipitor 20       Chronic diastolic congestive heart failure    Latest ECHO  Results for orders placed during the hospital encounter of 10/22/24    Echo    Interpretation Summary    Left Ventricle: The left ventricle is normal in size. Normal wall thickness. There is normal systolic function with a visually estimated ejection fraction of 60 - 65%. There is normal diastolic function.    Right Ventricle: Normal right ventricular cavity size. Wall thickness is normal. Systolic function is normal.    Left Atrium: Left atrium is severely dilated.    Right Atrium: Right atrium is dilated.    Aortic Valve: The aortic valve  is a trileaflet valve. There is moderate aortic valve sclerosis. Mildly restricted motion.    Tricuspid Valve: There is mild regurgitation.    IVC/SVC: Normal venous pressure at 3 mmHg.    Current Heart Failure Medications  hydrALAZINE injection 10 mg, Every 4 hours PRN, Intravenous  labetalol 20 mg/4 mL (5 mg/mL) IV syring, Every 4 hours PRN, Intravenous  hydrALAZINE tablet 50 mg, Every 8 hours, Oral  lisinopriL tablet 40 mg, Daily, Oral    Plan  - Monitor strict I&Os every 4 hours and daily weights.    - On telemetry  - The patient's volume status is stable        Natasha Rivera PA-C  Neurosurgery  Dario Glover - Neurosurgery ([facility])

## 2024-12-05 NOTE — PT/OT/SLP PROGRESS
"Speech Language Pathology Treatment    Patient Name:  Ca Coats   MRN:  [id number]  Admitting Diagnosis: Cerebral aneurysm    Recommendations:                 General Recommendations:  Dysphagia therapy, Speech/language therapy, and Cognitive-linguistic therapy  Diet recommendations:  Regular Diet - IDDSI Level 7, Liquid Diet Level: Thin liquids - IDDSI Level 0   Aspiration Precautions: Assistance with meals and Strict aspiration precautions   General Precautions: Standard, aspiration, fall  Communication strategies:  provide increased time to answer and go to room if call light pushed    Assessment:     Ca Coats is a 73 y.o. female with an SLP diagnosis of Aphasia, Dysarthria, and Cognitive-Linguistic Impairment.  She presents with resolving dysphagia.    Subjective     "That;s enough for now."     Pain/Comfort:  Pain Rating 1: 0/10  Pain Rating Post-Intervention 1: 0/10    Respiratory Status: Room air    Objective:     Has the patient been evaluated by SLP for swallowing?   Yes  Keep patient NPO? No     Pt alert, attempting to self feed regular diet with thin liquids when SLP entered room.  Difficulty visualizing items on tray and bringing utensil to mouth noted.  SLP assisted with feeding for remainder of tray though pt did  some pieces of chopped sausage with her fingers for feeding at end of meal.  Mastication was prolonged 2/2 lack of dentition though functional with adequate oral clearance.  No overt s/s aspiration. Rec cont regular diet with assistance with meals. Pt did not name items on her tray despite max cues.  She did choose item that she wanted x1 given field of 3.  On all other prompts, pt responded "I don't know".  Simple y/n questions answered with 60% accy IND, 80% given min cues.  1 step commands followed with 50% accy IND, 80% accy with mod cues.  Perseveration evident.  Auto phrase completion completed with 100% accy given repetitions. Education provided re: " ongoing diet recs, communication strategies, and POC.  Education to be ongoing.       Goals:   Multidisciplinary Problems       SLP Goals          Problem: SLP    Goal Priority Disciplines Outcome   SLP Goal     SLP Progressing   Description: Speech Language Pathology Goals  Goals expected to be met by 12/5:  1) Pt will participate in ongoing assessment of swallow function to determine least restrictive diet.  2) Pt will participate in ongoing speech, language, cognitive evaluation to determine possible goals and poc.   3) Pt will respond to simple yes/no questions with 80% accy given min cues.  4) Pt will complete automatic speech tasks with 90% accy given min cues.  5) Pt will complete naming tasks with 80% accy given mod cues.  6) Pt will follow simple commands with 90% accy given mod cues.                                 Plan:     Patient to be seen:  4 x/week   Plan of Care expires:  12/22/24  Plan of Care reviewed with:  patient   SLP Follow-Up:  Yes       Discharge recommendations:  High Intensity Therapy   Barriers to Discharge:  Level of Skilled Assistance Needed      Time Tracking:     SLP Treatment Date:   12/05/24  Speech Start Time:  0842  Speech Stop Time:  0859     Speech Total Time (min):  17 min    Billable Minutes: Speech Therapy Individual 9 and Treatment Swallowing Dysfunction 8    12/05/2024

## 2024-12-06 PROBLEM — G93.5 BRAIN COMPRESSION: Status: ACTIVE | Noted: 2024-12-06

## 2024-12-06 LAB
ANION GAP SERPL CALC-SCNC: 5 MMOL/L (ref 8–16)
BASOPHILS # BLD AUTO: 0.03 K/UL (ref 0–0.2)
BASOPHILS NFR BLD: 0.5 % (ref 0–1.9)
BUN SERPL-MCNC: 22 MG/DL (ref 8–23)
CALCIUM SERPL-MCNC: 10 MG/DL (ref 8.7–10.5)
CHLORIDE SERPL-SCNC: 112 MMOL/L (ref 95–110)
CO2 SERPL-SCNC: 21 MMOL/L (ref 23–29)
CREAT SERPL-MCNC: 1.1 MG/DL (ref 0.5–1.4)
DIFFERENTIAL METHOD BLD: ABNORMAL
EOSINOPHIL # BLD AUTO: 0.2 K/UL (ref 0–0.5)
EOSINOPHIL NFR BLD: 2.9 % (ref 0–8)
ERYTHROCYTE [DISTWIDTH] IN BLOOD BY AUTOMATED COUNT: 14 % (ref 11.5–14.5)
EST. GFR  (NO RACE VARIABLE): 53.7 ML/MIN/1.73 M^2
GLUCOSE SERPL-MCNC: 110 MG/DL (ref 70–110)
HCT VFR BLD AUTO: 28.7 % (ref 37–48.5)
HGB BLD-MCNC: 9 G/DL (ref 12–16)
IMM GRANULOCYTES # BLD AUTO: 0.02 K/UL (ref 0–0.04)
IMM GRANULOCYTES NFR BLD AUTO: 0.3 % (ref 0–0.5)
LYMPHOCYTES # BLD AUTO: 1.2 K/UL (ref 1–4.8)
LYMPHOCYTES NFR BLD: 20.7 % (ref 18–48)
MCH RBC QN AUTO: 32.5 PG (ref 27–31)
MCHC RBC AUTO-ENTMCNC: 31.4 G/DL (ref 32–36)
MCV RBC AUTO: 104 FL (ref 82–98)
MONOCYTES # BLD AUTO: 1.3 K/UL (ref 0.3–1)
MONOCYTES NFR BLD: 21.4 % (ref 4–15)
NEUTROPHILS # BLD AUTO: 3.2 K/UL (ref 1.8–7.7)
NEUTROPHILS NFR BLD: 54.2 % (ref 38–73)
NRBC BLD-RTO: 0 /100 WBC
PLATELET # BLD AUTO: 299 K/UL (ref 150–450)
PMV BLD AUTO: 11 FL (ref 9.2–12.9)
POTASSIUM SERPL-SCNC: 4.6 MMOL/L (ref 3.5–5.1)
RBC # BLD AUTO: 2.77 M/UL (ref 4–5.4)
SODIUM SERPL-SCNC: 138 MMOL/L (ref 136–145)
WBC # BLD AUTO: 5.85 K/UL (ref 3.9–12.7)

## 2024-12-06 PROCEDURE — 92526 ORAL FUNCTION THERAPY: CPT | Mod: HCNC

## 2024-12-06 PROCEDURE — 25000003 PHARM REV CODE 250: Mod: HCNC | Performed by: STUDENT IN AN ORGANIZED HEALTH CARE EDUCATION/TRAINING PROGRAM

## 2024-12-06 PROCEDURE — 97110 THERAPEUTIC EXERCISES: CPT | Mod: HCNC,CQ

## 2024-12-06 PROCEDURE — 11000001 HC ACUTE MED/SURG PRIVATE ROOM: Mod: HCNC

## 2024-12-06 PROCEDURE — 97530 THERAPEUTIC ACTIVITIES: CPT | Mod: HCNC

## 2024-12-06 PROCEDURE — 36415 COLL VENOUS BLD VENIPUNCTURE: CPT | Mod: HCNC | Performed by: STUDENT IN AN ORGANIZED HEALTH CARE EDUCATION/TRAINING PROGRAM

## 2024-12-06 PROCEDURE — 80048 BASIC METABOLIC PNL TOTAL CA: CPT | Mod: HCNC | Performed by: STUDENT IN AN ORGANIZED HEALTH CARE EDUCATION/TRAINING PROGRAM

## 2024-12-06 PROCEDURE — 85025 COMPLETE CBC W/AUTO DIFF WBC: CPT | Mod: HCNC | Performed by: STUDENT IN AN ORGANIZED HEALTH CARE EDUCATION/TRAINING PROGRAM

## 2024-12-06 PROCEDURE — 92507 TX SP LANG VOICE COMM INDIV: CPT | Mod: HCNC

## 2024-12-06 PROCEDURE — 25000003 PHARM REV CODE 250: Mod: HCNC | Performed by: PHYSICIAN ASSISTANT

## 2024-12-06 PROCEDURE — 63600175 PHARM REV CODE 636 W HCPCS: Mod: HCNC | Performed by: NURSE PRACTITIONER

## 2024-12-06 RX ADMIN — GABAPENTIN 600 MG: 250 SOLUTION ORAL at 09:12

## 2024-12-06 RX ADMIN — LEVETIRACETAM 1000 MG: 500 SOLUTION ORAL at 09:12

## 2024-12-06 RX ADMIN — HEPARIN SODIUM 7500 UNITS: 5000 INJECTION INTRAVENOUS; SUBCUTANEOUS at 09:12

## 2024-12-06 RX ADMIN — ALUMINUM HYDROXIDE, MAGNESIUM HYDROXIDE, AND DIMETHICONE 10 ML: 400; 400; 40 SUSPENSION ORAL at 09:12

## 2024-12-06 RX ADMIN — HYDRALAZINE HYDROCHLORIDE 50 MG: 25 TABLET ORAL at 09:12

## 2024-12-06 RX ADMIN — HYDRALAZINE HYDROCHLORIDE 50 MG: 25 TABLET ORAL at 01:12

## 2024-12-06 RX ADMIN — SULFAMETHOXAZOLE AND TRIMETHOPRIM 1 TABLET: 800; 160 TABLET ORAL at 09:12

## 2024-12-06 RX ADMIN — CITALOPRAM HYDROBROMIDE 10 MG: 10 TABLET ORAL at 09:12

## 2024-12-06 RX ADMIN — ALUMINUM HYDROXIDE, MAGNESIUM HYDROXIDE, AND DIMETHICONE 10 ML: 400; 400; 40 SUSPENSION ORAL at 01:12

## 2024-12-06 RX ADMIN — ALUMINUM HYDROXIDE, MAGNESIUM HYDROXIDE, AND DIMETHICONE 10 ML: 400; 400; 40 SUSPENSION ORAL at 06:12

## 2024-12-06 RX ADMIN — SODIUM CHLORIDE 250 ML: 9 INJECTION, SOLUTION INTRAVENOUS at 02:12

## 2024-12-06 RX ADMIN — LISINOPRIL 40 MG: 20 TABLET ORAL at 09:12

## 2024-12-06 RX ADMIN — HEPARIN SODIUM 7500 UNITS: 5000 INJECTION INTRAVENOUS; SUBCUTANEOUS at 06:12

## 2024-12-06 RX ADMIN — HYDRALAZINE HYDROCHLORIDE 50 MG: 25 TABLET ORAL at 06:12

## 2024-12-06 RX ADMIN — HEPARIN SODIUM 7500 UNITS: 5000 INJECTION INTRAVENOUS; SUBCUTANEOUS at 01:12

## 2024-12-06 RX ADMIN — GABAPENTIN 600 MG: 250 SOLUTION ORAL at 01:12

## 2024-12-06 RX ADMIN — ATORVASTATIN CALCIUM 20 MG: 20 TABLET, FILM COATED ORAL at 09:12

## 2024-12-06 RX ADMIN — GABAPENTIN 600 MG: 250 SOLUTION ORAL at 06:12

## 2024-12-06 NOTE — SUBJECTIVE & OBJECTIVE
Interval History: 12/6: NAEO. I/O -250. Given another 250cc bolus. Remains drowsy but following all commands.     Medications:  Continuous Infusions:  Scheduled Meds:   atorvastatin  20 mg Oral Daily    citalopram  10 mg Oral Daily    duke's soln (benadryl 30 mL, mylanta 30 mL, LIDOcaine 30 mL, nystatin 30 mL) 120 mL  10 mL Oral QID    gabapentin  600 mg Oral Q8H    heparin (porcine)  7,500 Units Subcutaneous Q8H    hydrALAZINE  50 mg Oral Q8H    levetiracetam  1,000 mg Oral BID    lisinopriL  40 mg Oral Daily    sodium chloride 0.9%  250 mL Intravenous Once    sulfamethoxazole-trimethoprim 800-160mg  1 tablet Oral BID     PRN Meds:  Current Facility-Administered Medications:     acetaminophen, 650 mg, Oral, Q4H PRN    bisacodyL, 10 mg, Rectal, Daily PRN    hydrALAZINE, 10 mg, Intravenous, Q4H PRN    labetalol, 10 mg, Intravenous, Q4H PRN    morphine, 2 mg, Intravenous, Q6H PRN    naloxone, 0.4 mg, Intravenous, PRN    ondansetron, 4 mg, Intravenous, Q12H PRN    oxyCODONE, 5 mg, Oral, Q6H PRN    sodium chloride 0.9%, 10 mL, Intravenous, PRN     Objective:     Weight: (!) 147.9 kg (326 lb)  Body mass index is 52.62 kg/m².  Vital Signs (Most Recent):  Temp: 97.8 °F (36.6 °C) (12/06/24 1131)  Pulse: 80 (12/06/24 1131)  Resp: 20 (12/06/24 1131)  BP: (!) 112/53 (12/06/24 1131)  SpO2: 96 % (12/06/24 1131) Vital Signs (24h Range):  Temp:  [97.8 °F (36.6 °C)-98.2 °F (36.8 °C)] 97.8 °F (36.6 °C)  Pulse:  [69-93] 80  Resp:  [16-20] 20  SpO2:  [94 %-97 %] 96 %  BP: (104-137)/(53-66) 112/53     Date 12/06/24 0700 - 12/07/24 0659   Shift 1189-5874 7593-5170 0892-8715 24 Hour Total   INTAKE   Shift Total(mL/kg)       OUTPUT   Urine(mL/kg/hr) 250   250   Shift Total(mL/kg) 250(1.7)   250(1.7)   Weight (kg) 147.9 147.9 147.9 147.9                       Female External Urinary Catheter w/ Suction 11/21/24 1400 (Active)   Skin no redness;no breakdown 12/06/24 0800   Tolerance no signs/symptoms of discomfort 12/06/24 0800   Suction  "Continuous suction at 60 mmHg 12/06/24 0800   Date of last wick change 12/06/24 12/06/24 0800   Time of last wick change 0900 12/06/24 0800   Output (mL) 250 mL 12/06/24 0800     Neurosurgery Physical Exam  General: well developed, well nourished, no distress.   Head: normocephalic, cranial incision c/d/I with chromic suture  Mental Status: Awake, Alert, Oriented x 1  Speech: Clear with content appropriate to conversation, mild aphasia  Cranial nerves: face symmetric, CN II-XII grossly intact.   Eyes: pupils equal, round, reactive to light, EOMI.  Sensory: intact to light touch throughout  Motor Strength: Moves all extremities spontaneously with good tone. At least antigravity strength in upper and lower extremities. No focal weakness identified. No abnormal movements seen.   Follows commands x 4 extremities  Skin: Skin is warm, dry and intact.    Significant Labs:  Recent Labs   Lab 12/05/24  0245   GLU 85      K 4.7   *   CO2 17*   BUN 22   CREATININE 1.0   CALCIUM 9.8   MG 2.2     Recent Labs   Lab 12/05/24  0245   WBC 9.92   HGB 9.5*   HCT 30.3*        No results for input(s): "LABPT", "INR", "APTT" in the last 48 hours.  Microbiology Results (last 7 days)       ** No results found for the last 168 hours. **          All pertinent labs from the last 24 hours have been reviewed.    Significant Diagnostics:  I have reviewed and interpreted all pertinent imaging results/findings within the past 24 hours.  "

## 2024-12-06 NOTE — ASSESSMENT & PLAN NOTE
Isabel Coats is a 73-year-old female past medical history of hypertension, obstructive sleep apnea on CPAP, suggestive heart failure, obesity, history of gastric cancer status post chemoradiation and DVT in currently on Eliquis. She presents to [facility] s/p L pterional crani on 11/20 with 7 clips applied in proximity to large irregular L MCA bifurcation aneurysm and other adjacent small aneurysms, 1 clip applied across neck of aneurysm just proximal to anterior choroidal.  After surgery she seemed to be gradually waking up appropriately overnight, then AM of POD1 around shift change had an unexplained decline in exam during which stat CT showed IPH in the right side of her cerebellum. She became less responsive as well as developed a L gaze preference and dense aphasia. She had a perfusion scan with questionable perfusion deficit to the left anterior temporal lobe and milrinone was started with seemingly some clinical improvement. Now gaze preference resolved, following commands x4 antigravity, able to state name but still remains aphasic.    Imaging:  CTA Head 11/20: expected post-op changes  CTH 11/21 8 AM: right sided cerebellar IPH  CTA 11/21 10 AM: stable IPH, possible frontal contusion  CTP 11/21 4 PM: possible left anterior temporal perfusion deficit in territory of branches distal to MCA clips   MRI brain w wo 11/22: no strokes, post op changes, stable cerebellar bleed. some edema in right frontal lobe.    Plans:  -Downgraded to NPU under NSGY  -q4h neurochecks/vitals  -SBP <160  -drain removed 11/25  -keppra 1g BID postop. cEEG negative during initial AMS POD1  -Cerebral vasospasm: weaned off milrinone prior to step down  - Continue daily TCDs x 14 days - completed.   - Keep euvolemic.   - Strict I/O. Please document I/O every 4 hours.   -PT/OT/OOB  -SLP - tolerating current diet but with poor intake. Continue SLP. Encourage PO fluids since NGT removed 11/28. Diet advanced to regular diet.   -Continue  nutritional support with boost supplementation  -Continue to monitor clinically, notify NSGY immediately with any changes in neuro status   -SQH for DVT ppx    Dispo: pending placement to [facility]    Case and plan discussed with attending neurosurgeon Dr. Friedman by NSGY staff

## 2024-12-06 NOTE — CARE UPDATE
I have reviewed the chart of Ca Coats who is hospitalized for the following:    Active Hospital Problems    Diagnosis    *Cerebral aneurysm    Brain compression     Decreased attenuation of the right superior cerebellar hemorrhage, however noting some increased vasogenic edema and localized mass effect in this region.   Close neurological monitoring, stat cth with acute changes  tcds      Oral thrush    Abscess    Hypercoagulable state     DVT in currently on Eliquis       Chronic anticoagulation     DVT in currently on Eliquis       Hypernatremia     Monitor with daily cmp      Hypophosphatemia    Aphasia     Therapy to evaluate and treat       Nontraumatic intracerebral hemorrhage     MRI brain w wo 11/22: no strokes, post op changes, stable cerebellar bleed. some edema in right frontal lobe.       Vasogenic cerebral edema     MRI brain w wo 11/22: no strokes, post op changes, stable cerebellar bleed. some edema in right frontal lobe.       Ventricular tachycardia    Acute postoperative anemia due to expected blood loss     Ebl 300  3 point drop in hemoglobin post op  Monitor with daily cbc  Transfuse <7          SAH (subarachnoid hemorrhage)     Small volume subarachnoid and intraventricular hemorrhage noted on imaging      IVH (intraventricular hemorrhage)    Tobacco dependency    DVT of deep femoral vein, right    Hypokalemia     Monitor with daily cmp  replaced      Gastric adenocarcinoma     gastric cancer cT2 or higher      Tobacco abuse    Chronic diastolic congestive heart failure    Pure hypercholesterolemia    Hypothyroidism    Debility    Gait instability    Essential hypertension    Severe obesity (BMI >= 40)        Janette Hurtado NP  [facility]

## 2024-12-06 NOTE — PT/OT/SLP PROGRESS
Occupational Therapy  Co- Treatment    Name: Ca Coats  MRN: [id number]  Admitting Diagnosis:  Cerebral aneurysm  16 Days Post-Op    Recommendations:     Discharge Recommendations: High Intensity Therapy  Discharge Equipment Recommendations:  hospital bed, lift device, wheelchair  Barriers to discharge:  None    Assessment:     Ca Coats is a 73 y.o. female with a medical diagnosis of Cerebral aneurysm.  She presents with decrease functional status secondary to medical diagnosis. Performance deficits affecting function are weakness, impaired balance, decreased safety awareness, impaired endurance, impaired cardiopulmonary response to activity, impaired self care skills, impaired functional mobility, gait instability, decreased lower extremity function, decreased upper extremity function, decreased coordination. Patient with fair tolerance to OT session this date demonstrating increased bed mobility, arousal level, and attempted verbalizations. Patient able to complete supine > sit with moderate x2 assistance and minimal assistance for return to supine. Patient deferring standing trial this date with increased verbalizations and attempts to return supine when sitting EOB; OT unable redirect patient for continued participation therefore patient returned to supine and session terminated. Patient remains appropriate candidate for acute OT services.     Rehab Prognosis:  Good; patient would benefit from acute skilled OT services to address these deficits and reach maximum level of function.       Plan:     Patient to be seen 4 x/week to address the above listed problems via neuromuscular re-education, self-care/home management, therapeutic activities, therapeutic exercises  Plan of Care Expires: 01/24/25  Plan of Care Reviewed with: patient    Subjective     Chief Complaint: none   Patient/Family Comments/goals: DC   Pain/Comfort:  Pain Rating 1: 0/10    Objective:     Communicated with: RN  prior to session.  Patient found supine with telemetry, PureWick upon OT entry to room.    General Precautions: Standard, fall    Orthopedic Precautions:N/A  Braces: N/A  Respiratory Status: Room air     Occupational Performance:     Bed Mobility:    Patient completed Rolling/Turning to Left with  minimum assistance  Patient completed Rolling/Turning to Right with minimum assistance  Patient completed Scooting/Bridging with maximal assistance  Patient completed Supine to Sit with moderate assistance and 2 persons  Patient completed Sit to Supine with minimum assistance   Patient sat EOB with standby assistance; While sitting EOB patient completed the following:   Postural cueing for upright sitting and midline orientation  Multi-directional reaching; emphasis on postural maintenance and upright tolerance     Treatment & Education:  Provided education regarding role of OT, POC, & discharge recommendations.  Pt with no further questions/concerns at this time. OT provided education on home recommendations and fall prevention in preparation for D/C. Co-Treatment conducted due to patient medical instability and to promote patient safety.     Patient left supine with all lines intact and call button in reach    GOALS:   Multidisciplinary Problems       Occupational Therapy Goals          Problem: Occupational Therapy    Goal Priority Disciplines Outcome Interventions   Occupational Therapy Goal     OT, PT/OT Progressing    Description: Goals to be met by: 12/24/24     Patient will increase functional independence with ADLs by performing:    UE Dressing with Stand-by Assistance.  LE Dressing with Minimal Assistance.  Grooming while seated with Stand-by Assistance.  Toileting from toilet/bedside commode with Minimal Assistance for hygiene and clothing management.   Supine to sit with Minimal Assistance.  Toilet transfer to toilet/bedside commode with Minimal Assistance.  Eating with Modified independence    Patient requires  a hospital bed for positioning of the body in ways that are not feasible with an ordinary bed. The patient requires special positioning for pain relief, limited mobility, and/or being unable to independently make changes in body position without the use of a hospital bed. Pillows and wedges will not be adequate for resolving these positional issues.      Patient has a mobility limitation that significantly impairs their ability to participate in one or more mobility related activities of daily living in customary locations in the home. The mobility limitation cannot be sufficiently resolved by the use of a cane or walker. The use of a manual wheelchair will greatly improve the patient's ability to participate in MRADLs. The patient will use the wheelchair on a regular basis at home. They have expressed their willingness to use a manual wheelchair in the home, and have a caregiver who is available and willing to assist with the wheelchair if needed.                            Time Tracking:     OT Date of Treatment: 12/06/24  OT Start Time: 1314  OT Stop Time: 1332  OT Total Time (min): 18 min    Billable Minutes:Therapeutic Activity 18    OT/RICHARD: OT     Number of RICHARD visits since last OT visit: 0    12/6/2024

## 2024-12-06 NOTE — PHYSICIAN QUERY
Question: Please specify diagnosis or diagnoses associated with the following clinical findings in the query.    Provider Query Response:  Non-Traumatic Brain Compression

## 2024-12-06 NOTE — PT/OT/SLP PROGRESS
Physical Therapy CoTreatment  OT present for cotreat due to pt's multiple medical comorbidities and functional/cognition deficits requiring two skilled therapists to appropriately progress pt's musculoskeletal strength, neuromuscular control, and endurance while taking into consideration medical acuity and pt safety.     Patient Name:  Ca Coats   MRN:  [id number]    Recommendations:     Discharge Recommendations: High Intensity Therapy (Simultaneous filing. User may not have seen previous data.)  Discharge Equipment Recommendations: hospital bed, lift device, wheelchair  Barriers to discharge: None    Assessment:     Ca Coats is a 73 y.o. female admitted with a medical diagnosis of Cerebral aneurysm.  She presents with the following impairments/functional limitations: weakness, impaired endurance, impaired self care skills, impaired functional mobility, gait instability, impaired balance, impaired cognition, decreased lower extremity function, decreased safety awareness. Pt agreeable for therapy initially, focused on seated balance and trunk stability while EOB, pt speaking more but also creating unintelligible speech, vehemently denies standing today, worked on therex in sitting    Rehab Prognosis: Good; patient would benefit from acute skilled PT services to address these deficits and reach maximum level of function.    Recent Surgery: Procedure(s) (LRB):  CRANIOTOMY, WITH ANEURYSM CLIPPING W/ STEALTH (Left)  CRANIOTOMY, FOR ANEURYSM OF VERTEBROBASILAR OR CAROTID CIRCULATION (Left)  DISSECTION, NERVE, USING OPERATING MICROSCOPE (Left) 16 Days Post-Op    Plan:     During this hospitalization, patient to be seen 4 x/week to address the identified rehab impairments via gait training, therapeutic activities, therapeutic exercises, neuromuscular re-education and progress toward the following goals:    Plan of Care Expires:  12/24/24    Subjective     Chief Complaint:  "lightheaded  Patient/Family Comments/goals: "Nooooo I can't stand today"  Pain/Comfort:  Pain Rating 1: 0/10 (Simultaneous filing. User may not have seen previous data.)  Pain Rating Post-Intervention 1: 0/10 (Simultaneous filing. User may not have seen previous data.)      Objective:     Communicated with RN prior to session.  Patient found supine with telemetry, PureWick upon PT entry to room.     General Precautions: Standard, fall (Simultaneous filing. User may not have seen previous data.)  Orthopedic Precautions: N/A  Braces: N/A  Respiratory Status: Room air     Functional Mobility:    Bed Mobility:   Rolling: to L/R side with moderate assistance  Supine > Sit: moderate assistance and of 2 persons  Sit > Supine: minimum assistance    Balance:   Sitting balance: FAIR+: Maintains balance through MINIMAL excursions of active trunk motion; performs sit and reach therex w/OT and seated therex w/PT, maintains balance and able to self correct         AM-PAC 6 CLICK MOBILITY  Turning over in bed (including adjusting bedclothes, sheets and blankets)?: 2  Sitting down on and standing up from a chair with arms (e.g., wheelchair, bedside commode, etc.): 2  Moving from lying on back to sitting on the side of the bed?: 2  Moving to and from a bed to a chair (including a wheelchair)?: 2  Need to walk in hospital room?: 2  Climbing 3-5 steps with a railing?: 1  Basic Mobility Total Score: 11       Treatment & Education:  Education:  PT role and PoC to increase strength and personal mobility  Therex: LAQ, ankle pumps x 5    Patient left supine with all lines intact and call button in reach..    GOALS:   Multidisciplinary Problems       Physical Therapy Goals          Problem: Physical Therapy    Goal Priority Disciplines Outcome Interventions   Physical Therapy Goal     PT, PT/OT Progressing    Description: Goals to be met by: 2024     Patient will increase functional independence with mobility by performin. " Supine to sit with MInimal Assistance  2. Sit to supine with Minimal Assistance  3. Sit to stand transfer with Moderate Assistance  4. Bed to chair transfer with Maximum Assistance using LRAD  5. Gait  x 5 feet with Maximum Assistance using LRAD.   6. Sitting at edge of bed x5 minutes with Stand-by Assistance  7. Lower extremity exercise program x15 reps per handout, with assistance as needed    Hospital Bed - Patient requires a hospital bed for positioning of the body in ways that are not feasible with an ordinary bed. The patient requires special positioning for pain relief, limited mobility, and/or being unable to independently make changes in body position without the use of a hospital bed. Pillows and wedges will not be adequate for resolving these positional issues.     Wheelchair - Patient has a mobility limitation that significantly impairs their ability to participate in one or more mobility related activities of daily living in customary locations in the home. The mobility limitation cannot be sufficiently resolved by the use of a cane or walker. The use of a manual wheelchair will greatly improve the patient's ability to participate in MRADLs. The patient will use the wheelchair on a regular basis at home. They have expressed their willingness to use a manual wheelchair in the home, and have a caregiver who is available and willing to assist with the wheelchair if needed                         Time Tracking:     PT Received On: 12/06/24  PT Start Time: 1313     PT Stop Time: 1332  PT Total Time (min): 19 min     Billable Minutes: Therapeutic Exercise 19min    Treatment Type: Treatment  PT/PTA: PTA     Number of PTA visits since last PT visit: 1     12/06/2024

## 2024-12-06 NOTE — PROGRESS NOTES
[facility] - Neurosurgery ([facility])  Neurosurgery  Progress Note    Subjective:     History of Present Illness: Isabel Coats is a 73-year-old female past medical history of hypertension, obstructive sleep apnea on CPAP, suggestive heart failure, obesity, history of gastric cancer status post chemoradiation and DVT in currently on Eliquis. She presents to [facility] L pterional crani, 7 clips applied in proximity to large irregular L MCA bifurcation aneurysm and other adjacent small aneurysms, 1 clip applied across neck of aneurysm just proximal to anterior choroidal.  She also has a current smoker she was initially referred to Neurology for workup for dizziness. She had been complaining of vertigo multiple times a day for the past several months, associated with shortness of breath, palpitation, nausea, headache, and feeling faint. She is admitted to [facility] for a higher level of care.     Post-Op Info:  Procedure(s) (LRB):  CRANIOTOMY, WITH ANEURYSM CLIPPING W/ STEALTH (Left)  CRANIOTOMY, FOR ANEURYSM OF VERTEBROBASILAR OR CAROTID CIRCULATION (Left)  DISSECTION, NERVE, USING OPERATING MICROSCOPE (Left)   16 Days Post-Op   Interval History: 12/6: NAEO. I/O -250. Given another 250cc bolus. Remains drowsy but following all commands.     Medications:  Continuous Infusions:  Scheduled Meds:   atorvastatin  20 mg Oral Daily    citalopram  10 mg Oral Daily    duke's soln (benadryl 30 mL, mylanta 30 mL, LIDOcaine 30 mL, nystatin 30 mL) 120 mL  10 mL Oral QID    gabapentin  600 mg Oral Q8H    heparin (porcine)  7,500 Units Subcutaneous Q8H    hydrALAZINE  50 mg Oral Q8H    levetiracetam  1,000 mg Oral BID    lisinopriL  40 mg Oral Daily    sodium chloride 0.9%  250 mL Intravenous Once    sulfamethoxazole-trimethoprim 800-160mg  1 tablet Oral BID     PRN Meds:  Current Facility-Administered Medications:     acetaminophen, 650 mg, Oral, Q4H PRN    bisacodyL, 10 mg, Rectal, Daily PRN    hydrALAZINE, 10 mg, Intravenous, Q4H PRN     labetalol, 10 mg, Intravenous, Q4H PRN    morphine, 2 mg, Intravenous, Q6H PRN    naloxone, 0.4 mg, Intravenous, PRN    ondansetron, 4 mg, Intravenous, Q12H PRN    oxyCODONE, 5 mg, Oral, Q6H PRN    sodium chloride 0.9%, 10 mL, Intravenous, PRN     Objective:     Weight: (!) 147.9 kg (326 lb)  Body mass index is 52.62 kg/m².  Vital Signs (Most Recent):  Temp: 97.8 °F (36.6 °C) (12/06/24 1131)  Pulse: 80 (12/06/24 1131)  Resp: 20 (12/06/24 1131)  BP: (!) 112/53 (12/06/24 1131)  SpO2: 96 % (12/06/24 1131) Vital Signs (24h Range):  Temp:  [97.8 °F (36.6 °C)-98.2 °F (36.8 °C)] 97.8 °F (36.6 °C)  Pulse:  [69-93] 80  Resp:  [16-20] 20  SpO2:  [94 %-97 %] 96 %  BP: (104-137)/(53-66) 112/53     Date 12/06/24 0700 - 12/07/24 0659   Shift 3553-2085 5010-0283 5962-2683 24 Hour Total   INTAKE   Shift Total(mL/kg)       OUTPUT   Urine(mL/kg/hr) 250   250   Shift Total(mL/kg) 250(1.7)   250(1.7)   Weight (kg) 147.9 147.9 147.9 147.9                       Female External Urinary Catheter w/ Suction 11/21/24 1400 (Active)   Skin no redness;no breakdown 12/06/24 0800   Tolerance no signs/symptoms of discomfort 12/06/24 0800   Suction Continuous suction at 60 mmHg 12/06/24 0800   Date of last wick change 12/06/24 12/06/24 0800   Time of last wick change 0900 12/06/24 0800   Output (mL) 250 mL 12/06/24 0800     Neurosurgery Physical Exam  General: well developed, well nourished, no distress.   Head: normocephalic, cranial incision c/d/I with chromic suture  Mental Status: Awake, Alert, Oriented x 1  Speech: Clear with content appropriate to conversation, mild aphasia  Cranial nerves: face symmetric, CN II-XII grossly intact.   Eyes: pupils equal, round, reactive to light, EOMI.  Sensory: intact to light touch throughout  Motor Strength: Moves all extremities spontaneously with good tone. At least antigravity strength in upper and lower extremities. No focal weakness identified. No abnormal movements seen.   Follows commands x 4  "extremities  Skin: Skin is warm, dry and intact.    Significant Labs:  Recent Labs   Lab 12/05/24  0245   GLU 85      K 4.7   *   CO2 17*   BUN 22   CREATININE 1.0   CALCIUM 9.8   MG 2.2     Recent Labs   Lab 12/05/24  0245   WBC 9.92   HGB 9.5*   HCT 30.3*        No results for input(s): "LABPT", "INR", "APTT" in the last 48 hours.  Microbiology Results (last 7 days)       ** No results found for the last 168 hours. **          All pertinent labs from the last 24 hours have been reviewed.    Significant Diagnostics:  I have reviewed and interpreted all pertinent imaging results/findings within the past 24 hours.  Assessment/Plan:     * Cerebral aneurysm  Isabel Coats is a 73-year-old female past medical history of hypertension, obstructive sleep apnea on CPAP, suggestive heart failure, obesity, history of gastric cancer status post chemoradiation and DVT in currently on Eliquis. She presents to [facility] s/p L pterional crani on 11/20 with 7 clips applied in proximity to large irregular L MCA bifurcation aneurysm and other adjacent small aneurysms, 1 clip applied across neck of aneurysm just proximal to anterior choroidal.  After surgery she seemed to be gradually waking up appropriately overnight, then AM of POD1 around shift change had an unexplained decline in exam during which stat CT showed IPH in the right side of her cerebellum. She became less responsive as well as developed a L gaze preference and dense aphasia. She had a perfusion scan with questionable perfusion deficit to the left anterior temporal lobe and milrinone was started with seemingly some clinical improvement. Now gaze preference resolved, following commands x4 antigravity, able to state name but still remains aphasic.    Imaging:  CTA Head 11/20: expected post-op changes  CTH 11/21 8 AM: right sided cerebellar IPH  CTA 11/21 10 AM: stable IPH, possible frontal contusion  CTP 11/21 4 PM: possible left anterior temporal " perfusion deficit in territory of branches distal to MCA clips   MRI brain w wo 11/22: no strokes, post op changes, stable cerebellar bleed. some edema in right frontal lobe.    Plans:  -Downgraded to NPU under NSGY  -q4h neurochecks/vitals  -SBP <160  -drain removed 11/25  -keppra 1g BID postop. cEEG negative during initial AMS POD1  -Cerebral vasospasm: weaned off milrinone prior to step down  - Continue daily TCDs x 14 days - completed.   - Keep euvolemic.   - Strict I/O. Please document I/O every 4 hours.   -PT/OT/OOB  -SLP - tolerating current diet but with poor intake. Continue SLP. Encourage PO fluids since NGT removed 11/28. Diet advanced to regular diet.   -Continue nutritional support with boost supplementation  -Continue to monitor clinically, notify NSGY immediately with any changes in neuro status   -SQH for DVT ppx    Dispo: pending placement to [facility]    Case and plan discussed with attending neurosurgeon Dr. Friedman by NSGY staff    Oral thrush  - Dukes solution ordered QID  - Continue oral care     Abscess  Small R inner thigh abscess noted with seropurulent drainage.  Gen Surg consulted 11/29:  This is spontaneously and adequately draining. No significant fluctuance or induration.     -No surgical intervention indicated or recommended at this time as this area is draining well  -CTM. Wound Care following.  -Bactrim started 11/29, continue x 7 days    Pure hypercholesterolemia  - home lipitor 20       Chronic diastolic congestive heart failure    Latest ECHO  Results for orders placed during the hospital encounter of 10/22/24    Echo    Interpretation Summary    Left Ventricle: The left ventricle is normal in size. Normal wall thickness. There is normal systolic function with a visually estimated ejection fraction of 60 - 65%. There is normal diastolic function.    Right Ventricle: Normal right ventricular cavity size. Wall thickness is normal. Systolic function is normal.    Left Atrium: Left atrium is  severely dilated.    Right Atrium: Right atrium is dilated.    Aortic Valve: The aortic valve is a trileaflet valve. There is moderate aortic valve sclerosis. Mildly restricted motion.    Tricuspid Valve: There is mild regurgitation.    IVC/SVC: Normal venous pressure at 3 mmHg.    Current Heart Failure Medications  hydrALAZINE injection 10 mg, Every 4 hours PRN, Intravenous  labetalol 20 mg/4 mL (5 mg/mL) IV syring, Every 4 hours PRN, Intravenous  hydrALAZINE tablet 50 mg, Every 8 hours, Oral  lisinopriL tablet 40 mg, Daily, Oral    Plan  - Monitor strict I&Os every 4 hours and daily weights.    - On telemetry  - The patient's volume status is stable        Natasha Rivera PA-C  Neurosurgery  [facility] - Neurosurgery ([facility])

## 2024-12-06 NOTE — PLAN OF CARE
Problem: Adult Inpatient Plan of Care  Goal: Plan of Care Review  Outcome: Progressing  Goal: Patient-Specific Goal (Individualized)  Outcome: Progressing  Goal: Absence of Hospital-Acquired Illness or Injury  Outcome: Progressing  Goal: Optimal Comfort and Wellbeing  Outcome: Progressing  Goal: Readiness for Transition of Care  Outcome: Progressing     Problem: Bariatric Environmental Safety  Goal: Safety Maintained with Care  Outcome: Progressing     Problem: Wound  Goal: Optimal Coping  Outcome: Progressing  Goal: Optimal Functional Ability  Outcome: Progressing  Goal: Absence of Infection Signs and Symptoms  Outcome: Progressing  Goal: Improved Oral Intake  Outcome: Progressing  Goal: Optimal Pain Control and Function  Outcome: Progressing  Goal: Skin Health and Integrity  Outcome: Progressing  Goal: Optimal Wound Healing  Outcome: Progressing     Problem: Infection  Goal: Absence of Infection Signs and Symptoms  Outcome: Progressing     Problem: Stroke, Intracerebral Hemorrhage  Goal: Optimal Coping  Outcome: Progressing  Goal: Effective Bowel Elimination  Outcome: Progressing  Goal: Optimal Cerebral Tissue Perfusion  Outcome: Progressing  Goal: Optimal Cognitive Function  Outcome: Progressing  Goal: Effective Communication Skills  Outcome: Progressing  Goal: Optimal Functional Ability  Outcome: Progressing  Goal: Optimal Nutrition Intake  Outcome: Progressing  Goal: Optimal Pain Control and Function  Outcome: Progressing  Goal: Effective Oxygenation and Ventilation  Outcome: Progressing  Goal: Improved Sensorimotor Function  Outcome: Progressing  Goal: Safe and Effective Swallow  Outcome: Progressing  Goal: Effective Urinary Elimination  Outcome: Progressing

## 2024-12-06 NOTE — PLAN OF CARE
Problem: Adult Inpatient Plan of Care  Goal: Plan of Care Review  Outcome: Progressing  Goal: Patient-Specific Goal (Individualized)  Outcome: Progressing  Goal: Optimal Comfort and Wellbeing  Outcome: Progressing     Problem: Wound  Goal: Absence of Infection Signs and Symptoms  Outcome: Progressing  Goal: Skin Health and Integrity  Outcome: Progressing

## 2024-12-06 NOTE — PROGRESS NOTES
Pt seen for wound care f/u- labia/groin MASD. Pt awake,lying in bed; no family at bedside. Pt said cream had been applied to area. Suggest continuing with a zinc barrier cream for protection, as ordered.      AURELIA LinderN, RN  [facility] Jayce braxton Wound/Ostomy  12/6/24 12/06/24 1218   WOCN Assessment   WOCN Total Time (mins) 30   Visit Date 12/06/24   Visit Time 1218   Consult Type Follow Up   WOCN Speciality Wound   Wound moisture   Number of Wounds 1   Intervention assessed;chart review;coordination of care   Teaching on-going

## 2024-12-06 NOTE — PLAN OF CARE
12/06/24 1127   Post-Acute Status   Post-Acute Authorization Placement   Post-Acute Placement Status Referrals Sent   Discharge Delays (!) Post-Acute Set-up   Discharge Plan   Discharge Plan A Skilled Nursing Facility   Discharge Plan B Home Health      Patient denied by [facility] and [facility]. Patient referral is under review by [facility]

## 2024-12-06 NOTE — PT/OT/SLP PROGRESS
Speech Language Pathology Treatment    Patient Name:  Ca Coats   MRN:  [id number]  Admitting Diagnosis: Cerebral aneurysm    Recommendations:                 General Recommendations:  Dysphagia therapy, Speech/language therapy, and Cognitive-linguistic therapy  Diet recommendations:  Regular Diet - IDDSI Level 7, Liquid Diet Level: Thin liquids - IDDSI Level 0   Aspiration Precautions: Assistance with meals, HOB to 90 degrees, Meds whole 1 at a time, and Strict aspiration precautions   General Precautions: Standard, aspiration, fall  Communication strategies:  provide increased time to answer and go to room if call light pushed    Assessment:     Ca Coats is a 73 y.o. female with an SLP diagnosis of Aphasia, Dysphagia, and Cognitive-Linguistic Impairment.  She presents with resolving dysphagia.    Subjective     Pt awake and alert upon arrival. Pt agreeable to participate in session. Nursing cleared.     Pain/Comfort:  Pain Rating 1: 0/10  Pain Rating Post-Intervention 1: 0/10    Respiratory Status: Room air    Objective:     Has the patient been evaluated by SLP for swallowing?   Yes  Keep patient NPO? No   Current Respiratory Status:        Pt seen for ongoing dysphagia and speech therapy. Pt awake and alert. Pt]'s breakfast tray present at bedside. PCT stating pt only consumed ~5 bites from tray. Pt declining further po offered by SLP. RN present administering po meds 1 at a time with sips of water. Pt demonstrated no difficulties. Pt attempting to produce phrases to state needs, but she was unable to finish full phrase 2/2 word finding difficulties. Pt answered simple y/n questions with 50% accuracy via head nods/shakes but was noted to produce most head shakes. Pt was unable to follow commands despite models provided. Pt completing automatic speech task of counting with 70% accuracy with cues. Pt identifying objects presented in Fo2 with 50% accuracy with moderate cues. Pt noted to  perseverate on previous tasks. SLP provided education regarding role of SLP, aspiration precautions, speech strategies, recommendations, and SLP POC. Pt expressed understanding but would benefit from reinforcement. SLP will continue to follow.     Goals:   Multidisciplinary Problems       SLP Goals          Problem: SLP    Goal Priority Disciplines Outcome   SLP Goal     SLP Progressing   Description: Speech Language Pathology Goals  Goals expected to be met by 12/5:  1) Pt will participate in ongoing assessment of swallow function to determine least restrictive diet.  2) Pt will participate in ongoing speech, language, cognitive evaluation to determine possible goals and poc.   3) Pt will respond to simple yes/no questions with 80% accy given min cues.  4) Pt will complete automatic speech tasks with 90% accy given min cues.  5) Pt will complete naming tasks with 80% accy given mod cues.  6) Pt will follow simple commands with 90% accy given mod cues.                                 Plan:     Patient to be seen:  4 x/week   Plan of Care expires:  12/22/24  Plan of Care reviewed with:  patient   SLP Follow-Up:  Yes       Discharge recommendations:  High Intensity Therapy   Barriers to Discharge:  Level of Skilled Assistance Needed      Time Tracking:     SLP Treatment Date:   12/06/24  Speech Start Time:  0910  Speech Stop Time:  0926     Speech Total Time (min):  16 min    Billable Minutes: Speech Therapy Individual 8 and Treatment Swallowing Dysfunction 8    12/06/2024

## 2024-12-07 LAB
ALBUMIN SERPL BCP-MCNC: 3 G/DL (ref 3.5–5.2)
ALP SERPL-CCNC: 111 U/L (ref 40–150)
ALT SERPL W/O P-5'-P-CCNC: 21 U/L (ref 10–44)
ANION GAP SERPL CALC-SCNC: 5 MMOL/L (ref 8–16)
AST SERPL-CCNC: 20 U/L (ref 10–40)
BASOPHILS # BLD AUTO: 0.03 K/UL (ref 0–0.2)
BASOPHILS NFR BLD: 0.5 % (ref 0–1.9)
BILIRUB SERPL-MCNC: 0.4 MG/DL (ref 0.1–1)
BUN SERPL-MCNC: 20 MG/DL (ref 8–23)
CALCIUM SERPL-MCNC: 10.1 MG/DL (ref 8.7–10.5)
CHLORIDE SERPL-SCNC: 113 MMOL/L (ref 95–110)
CO2 SERPL-SCNC: 19 MMOL/L (ref 23–29)
CREAT SERPL-MCNC: 0.9 MG/DL (ref 0.5–1.4)
DIFFERENTIAL METHOD BLD: ABNORMAL
EOSINOPHIL # BLD AUTO: 0.3 K/UL (ref 0–0.5)
EOSINOPHIL NFR BLD: 4.3 % (ref 0–8)
ERYTHROCYTE [DISTWIDTH] IN BLOOD BY AUTOMATED COUNT: 13.9 % (ref 11.5–14.5)
EST. GFR  (NO RACE VARIABLE): >60 ML/MIN/1.73 M^2
GLUCOSE SERPL-MCNC: 87 MG/DL (ref 70–110)
HCT VFR BLD AUTO: 33 % (ref 37–48.5)
HGB BLD-MCNC: 10 G/DL (ref 12–16)
IMM GRANULOCYTES # BLD AUTO: 0.02 K/UL (ref 0–0.04)
IMM GRANULOCYTES NFR BLD AUTO: 0.3 % (ref 0–0.5)
LYMPHOCYTES # BLD AUTO: 1.3 K/UL (ref 1–4.8)
LYMPHOCYTES NFR BLD: 22.6 % (ref 18–48)
MAGNESIUM SERPL-MCNC: 2.2 MG/DL (ref 1.6–2.6)
MCH RBC QN AUTO: 32.8 PG (ref 27–31)
MCHC RBC AUTO-ENTMCNC: 30.3 G/DL (ref 32–36)
MCV RBC AUTO: 108 FL (ref 82–98)
MONOCYTES # BLD AUTO: 1.4 K/UL (ref 0.3–1)
MONOCYTES NFR BLD: 24.1 % (ref 4–15)
NEUTROPHILS # BLD AUTO: 2.8 K/UL (ref 1.8–7.7)
NEUTROPHILS NFR BLD: 48.2 % (ref 38–73)
NRBC BLD-RTO: 0 /100 WBC
PHOSPHATE SERPL-MCNC: 2.7 MG/DL (ref 2.7–4.5)
PLATELET # BLD AUTO: 272 K/UL (ref 150–450)
PMV BLD AUTO: 11.4 FL (ref 9.2–12.9)
POTASSIUM SERPL-SCNC: 4.9 MMOL/L (ref 3.5–5.1)
PROT SERPL-MCNC: 6.4 G/DL (ref 6–8.4)
RBC # BLD AUTO: 3.05 M/UL (ref 4–5.4)
SODIUM SERPL-SCNC: 137 MMOL/L (ref 136–145)
WBC # BLD AUTO: 5.76 K/UL (ref 3.9–12.7)

## 2024-12-07 PROCEDURE — 83735 ASSAY OF MAGNESIUM: CPT | Mod: HCNC | Performed by: PHYSICIAN ASSISTANT

## 2024-12-07 PROCEDURE — 84100 ASSAY OF PHOSPHORUS: CPT | Mod: HCNC | Performed by: PHYSICIAN ASSISTANT

## 2024-12-07 PROCEDURE — 85025 COMPLETE CBC W/AUTO DIFF WBC: CPT | Mod: HCNC | Performed by: PHYSICIAN ASSISTANT

## 2024-12-07 PROCEDURE — 80053 COMPREHEN METABOLIC PANEL: CPT | Mod: HCNC | Performed by: PHYSICIAN ASSISTANT

## 2024-12-07 PROCEDURE — 63600175 PHARM REV CODE 636 W HCPCS: Mod: HCNC | Performed by: NURSE PRACTITIONER

## 2024-12-07 PROCEDURE — 36415 COLL VENOUS BLD VENIPUNCTURE: CPT | Mod: HCNC | Performed by: PHYSICIAN ASSISTANT

## 2024-12-07 PROCEDURE — 25000003 PHARM REV CODE 250: Mod: HCNC | Performed by: PHYSICIAN ASSISTANT

## 2024-12-07 PROCEDURE — 11000001 HC ACUTE MED/SURG PRIVATE ROOM: Mod: HCNC

## 2024-12-07 RX ADMIN — HYDRALAZINE HYDROCHLORIDE 50 MG: 25 TABLET ORAL at 02:12

## 2024-12-07 RX ADMIN — GABAPENTIN 600 MG: 250 SOLUTION ORAL at 02:12

## 2024-12-07 RX ADMIN — GABAPENTIN 600 MG: 250 SOLUTION ORAL at 09:12

## 2024-12-07 RX ADMIN — ATORVASTATIN CALCIUM 20 MG: 20 TABLET, FILM COATED ORAL at 08:12

## 2024-12-07 RX ADMIN — HYDRALAZINE HYDROCHLORIDE 50 MG: 25 TABLET ORAL at 05:12

## 2024-12-07 RX ADMIN — LEVETIRACETAM 1000 MG: 500 SOLUTION ORAL at 09:12

## 2024-12-07 RX ADMIN — ALUMINUM HYDROXIDE, MAGNESIUM HYDROXIDE, AND DIMETHICONE 10 ML: 400; 400; 40 SUSPENSION ORAL at 08:12

## 2024-12-07 RX ADMIN — ALUMINUM HYDROXIDE, MAGNESIUM HYDROXIDE, AND DIMETHICONE 10 ML: 400; 400; 40 SUSPENSION ORAL at 05:12

## 2024-12-07 RX ADMIN — SULFAMETHOXAZOLE AND TRIMETHOPRIM 1 TABLET: 800; 160 TABLET ORAL at 08:12

## 2024-12-07 RX ADMIN — ALUMINUM HYDROXIDE, MAGNESIUM HYDROXIDE, AND DIMETHICONE 10 ML: 400; 400; 40 SUSPENSION ORAL at 09:12

## 2024-12-07 RX ADMIN — HYDRALAZINE HYDROCHLORIDE 50 MG: 25 TABLET ORAL at 09:12

## 2024-12-07 RX ADMIN — SULFAMETHOXAZOLE AND TRIMETHOPRIM 1 TABLET: 800; 160 TABLET ORAL at 09:12

## 2024-12-07 RX ADMIN — HEPARIN SODIUM 7500 UNITS: 5000 INJECTION INTRAVENOUS; SUBCUTANEOUS at 05:12

## 2024-12-07 RX ADMIN — LISINOPRIL 40 MG: 20 TABLET ORAL at 08:12

## 2024-12-07 RX ADMIN — CITALOPRAM HYDROBROMIDE 10 MG: 10 TABLET ORAL at 08:12

## 2024-12-07 RX ADMIN — ALUMINUM HYDROXIDE, MAGNESIUM HYDROXIDE, AND DIMETHICONE 10 ML: 400; 400; 40 SUSPENSION ORAL at 02:12

## 2024-12-07 RX ADMIN — HEPARIN SODIUM 7500 UNITS: 5000 INJECTION INTRAVENOUS; SUBCUTANEOUS at 10:12

## 2024-12-07 RX ADMIN — GABAPENTIN 600 MG: 250 SOLUTION ORAL at 05:12

## 2024-12-07 RX ADMIN — HEPARIN SODIUM 7500 UNITS: 5000 INJECTION INTRAVENOUS; SUBCUTANEOUS at 02:12

## 2024-12-07 RX ADMIN — LEVETIRACETAM 1000 MG: 500 SOLUTION ORAL at 08:12

## 2024-12-07 NOTE — SUBJECTIVE & OBJECTIVE
Interval History: 12/7: NAEO. AFVSS. Patient less drowsy on exam this AM, oriented x 2, briskly following commands, sitting up eating breakfast. Pending IPR.     Medications:  Continuous Infusions:  Scheduled Meds:   atorvastatin  20 mg Oral Daily    citalopram  10 mg Oral Daily    duke's soln (benadryl 30 mL, mylanta 30 mL, LIDOcaine 30 mL, nystatin 30 mL) 120 mL  10 mL Oral QID    gabapentin  600 mg Oral Q8H    heparin (porcine)  7,500 Units Subcutaneous Q8H    hydrALAZINE  50 mg Oral Q8H    levetiracetam  1,000 mg Oral BID    lisinopriL  40 mg Oral Daily    sulfamethoxazole-trimethoprim 800-160mg  1 tablet Oral BID     PRN Meds:  Current Facility-Administered Medications:     acetaminophen, 650 mg, Oral, Q4H PRN    bisacodyL, 10 mg, Rectal, Daily PRN    hydrALAZINE, 10 mg, Intravenous, Q4H PRN    labetalol, 10 mg, Intravenous, Q4H PRN    morphine, 2 mg, Intravenous, Q6H PRN    naloxone, 0.4 mg, Intravenous, PRN    ondansetron, 4 mg, Intravenous, Q12H PRN    oxyCODONE, 5 mg, Oral, Q6H PRN    sodium chloride 0.9%, 10 mL, Intravenous, PRN       Objective:     Weight: (!) 147.9 kg (326 lb)  Body mass index is 52.62 kg/m².  Vital Signs (Most Recent):  Temp: 98.4 °F (36.9 °C) (12/07/24 1112)  Pulse: 69 (12/07/24 1112)  Resp: 20 (12/07/24 1112)  BP: (!) 118/54 (12/07/24 1112)  SpO2: 96 % (12/07/24 1112) Vital Signs (24h Range):  Temp:  [97.5 °F (36.4 °C)-98.7 °F (37.1 °C)] 98.4 °F (36.9 °C)  Pulse:  [58-87] 69  Resp:  [16-20] 20  SpO2:  [95 %-96 %] 96 %  BP: (107-118)/(51-56) 118/54     Date 12/07/24 0700 - 12/08/24 0659   Shift 2229-7271 3998-7308 9301-7508 24 Hour Total   INTAKE   Shift Total(mL/kg)       OUTPUT   Urine(mL/kg/hr) 400   400   Shift Total(mL/kg) 400(2.7)   400(2.7)   Weight (kg) 147.9 147.9 147.9 147.9                       Female External Urinary Catheter w/ Suction 11/21/24 1400 (Active)   Skin no redness;no breakdown 12/07/24 0800   Tolerance no signs/symptoms of discomfort 12/07/24 0800   Suction  "Continuous suction at 60 mmHg 12/07/24 0800   Date of last wick change 12/07/24 12/07/24 0800   Time of last wick change 0900 12/07/24 0800   Output (mL) 400 mL 12/07/24 0800      Physical Exam  General: well developed, well nourished, no distress.   Head: normocephalic, cranial incision c/d/I with chromic suture  Mental Status: Awake, Alert, Oriented x 2  Speech: Clear with content appropriate to conversation, mild aphasia  Cranial nerves: face symmetric, CN II-XII grossly intact.   Eyes: pupils equal, round, reactive to light, EOMI.  Sensory: intact to light touch throughout  Motor Strength: Moves all extremities spontaneously with good tone. At least antigravity strength in upper and lower extremities. No focal weakness identified. No abnormal movements seen.   Follows commands x 4 extremities  Skin: Skin is warm, dry and intact.     Significant Labs:  Recent Labs   Lab 12/06/24  1529 12/07/24  0502    87    137   K 4.6 4.9   * 113*   CO2 21* 19*   BUN 22 20   CREATININE 1.1 0.9   CALCIUM 10.0 10.1   MG  --  2.2     Recent Labs   Lab 12/06/24  1529 12/07/24  0502   WBC 5.85 5.76   HGB 9.0* 10.0*   HCT 28.7* 33.0*    272     No results for input(s): "LABPT", "INR", "APTT" in the last 48 hours.  Microbiology Results (last 7 days)       ** No results found for the last 168 hours. **          All pertinent labs from the last 24 hours have been reviewed.    Significant Diagnostics:  No new relevant imaging to discuss.   "

## 2024-12-07 NOTE — ASSESSMENT & PLAN NOTE
Isabel Coats is a 73-year-old female past medical history of hypertension, obstructive sleep apnea on CPAP, suggestive heart failure, obesity, history of gastric cancer status post chemoradiation and DVT in currently on Eliquis. She presents to [facility] s/p L pterional crani on 11/20 with 7 clips applied in proximity to large irregular L MCA bifurcation aneurysm and other adjacent small aneurysms, 1 clip applied across neck of aneurysm just proximal to anterior choroidal.  After surgery she seemed to be gradually waking up appropriately overnight, then AM of POD1 around shift change had an unexplained decline in exam during which stat CT showed IPH in the right side of her cerebellum. She became less responsive as well as developed a L gaze preference and dense aphasia. She had a perfusion scan with questionable perfusion deficit to the left anterior temporal lobe and milrinone was started with seemingly some clinical improvement. Now gaze preference resolved, following commands x4 antigravity, able to state name but still remains aphasic.    Imaging:  CTA Head 11/20: expected post-op changes  CTH 11/21 8 AM: right sided cerebellar IPH  CTA 11/21 10 AM: stable IPH, possible frontal contusion  CTP 11/21 4 PM: possible left anterior temporal perfusion deficit in territory of branches distal to MCA clips   MRI brain w wo 11/22: no strokes, post op changes, stable cerebellar bleed. some edema in right frontal lobe.    Plans:  -Downgraded to NPU under NSGY  -q4h neurochecks/vitals  -SBP <160  -drain removed 11/25  -keppra 1g BID postop. cEEG negative during initial AMS POD1  -Cerebral vasospasm: weaned off milrinone prior to step down  - Continue daily TCDs x 14 days - completed.   - Keep euvolemic.   - Strict I/O. Please document I/O every 4 hours.   -PT/OT/OOB  -SLP - tolerating current diet but with poor intake. Continue SLP. Encourage PO fluids since NGT removed 11/28. Diet advanced to regular diet.   -Continue  nutritional support with boost supplementation  -Continue to monitor clinically, notify NSGY immediately with any changes in neuro status   -SQH for DVT ppx    Dispo: pending placement to [facility]    Case and plan discussed with attending neurosurgeon Dr. Friedman

## 2024-12-07 NOTE — PROGRESS NOTES
[facility] - Neurosurgery ([facility])  Neurosurgery  Progress Note    Subjective:     History of Present Illness: Isabel Coats is a 73-year-old female past medical history of hypertension, obstructive sleep apnea on CPAP, suggestive heart failure, obesity, history of gastric cancer status post chemoradiation and DVT in currently on Eliquis. She presents to [facility] L pterional crani, 7 clips applied in proximity to large irregular L MCA bifurcation aneurysm and other adjacent small aneurysms, 1 clip applied across neck of aneurysm just proximal to anterior choroidal.  She also has a current smoker she was initially referred to Neurology for workup for dizziness. She had been complaining of vertigo multiple times a day for the past several months, associated with shortness of breath, palpitation, nausea, headache, and feeling faint. She is admitted to [facility] for a higher level of care.     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 AFVSS. Patient less drowsy on exam this AM, oriented x 2, briskly following commands, sitting up eating breakfast. Pending IPR.     Medications:  Continuous Infusions:  Scheduled Meds:   atorvastatin  20 mg Oral Daily    citalopram  10 mg Oral Daily    duke's soln (benadryl 30 mL, mylanta 30 mL, LIDOcaine 30 mL, nystatin 30 mL) 120 mL  10 mL Oral QID    gabapentin  600 mg Oral Q8H    heparin (porcine)  7,500 Units Subcutaneous Q8H    hydrALAZINE  50 mg Oral Q8H    levetiracetam  1,000 mg Oral BID    lisinopriL  40 mg Oral Daily    sulfamethoxazole-trimethoprim 800-160mg  1 tablet Oral BID     PRN Meds:  Current Facility-Administered Medications:     acetaminophen, 650 mg, Oral, Q4H PRN    bisacodyL, 10 mg, Rectal, Daily PRN    hydrALAZINE, 10 mg, Intravenous, Q4H PRN     labetalol, 10 mg, Intravenous, Q4H PRN    morphine, 2 mg, Intravenous, Q6H PRN    naloxone, 0.4 mg, Intravenous, PRN    ondansetron, 4 mg, Intravenous, Q12H PRN    oxyCODONE, 5 mg, Oral, Q6H PRN    sodium chloride 0.9%, 10 mL, Intravenous, PRN       Objective:     Weight: (!) 147.9 kg (326 lb)  Body mass index is 52.62 kg/m².  Vital Signs (Most Recent):  Temp: 98.4 °F (36.9 °C) (12/07/24 1112)  Pulse: 69 (12/07/24 1112)  Resp: 20 (12/07/24 1112)  BP: (!) 118/54 (12/07/24 1112)  SpO2: 96 % (12/07/24 1112) Vital Signs (24h Range):  Temp:  [97.5 °F (36.4 °C)-98.7 °F (37.1 °C)] 98.4 °F (36.9 °C)  Pulse:  [58-87] 69  Resp:  [16-20] 20  SpO2:  [95 %-96 %] 96 %  BP: (107-118)/(51-56) 118/54     Date 12/07/24 0700 - 12/08/24 0659   Shift 7991-8034 9524-1804 1996-6617 24 Hour Total   INTAKE   Shift Total(mL/kg)       OUTPUT   Urine(mL/kg/hr) 400   400   Shift Total(mL/kg) 400(2.7)   400(2.7)   Weight (kg) 147.9 147.9 147.9 147.9                       Female External Urinary Catheter w/ Suction 11/21/24 1400 (Active)   Skin no redness;no breakdown 12/07/24 0800   Tolerance no signs/symptoms of discomfort 12/07/24 0800   Suction Continuous suction at 60 mmHg 12/07/24 0800   Date of last wick change 12/07/24 12/07/24 0800   Time of last wick change 0900 12/07/24 0800   Output (mL) 400 mL 12/07/24 0800      Physical Exam  General: well developed, well nourished, no distress.   Head: normocephalic, cranial incision c/d/I with chromic suture  Mental Status: Awake, Alert, Oriented x 2  Speech: Clear with content appropriate to conversation, mild aphasia  Cranial nerves: face symmetric, CN II-XII grossly intact.   Eyes: pupils equal, round, reactive to light, EOMI.  Sensory: intact to light touch throughout  Motor Strength: Moves all extremities spontaneously with good tone. At least antigravity strength in upper and lower extremities. No focal weakness identified. No abnormal movements seen.   Follows commands x 4  "extremities  Skin: Skin is warm, dry and intact.     Significant Labs:  Recent Labs   Lab 12/06/24  1529 12/07/24  0502    87    137   K 4.6 4.9   * 113*   CO2 21* 19*   BUN 22 20   CREATININE 1.1 0.9   CALCIUM 10.0 10.1   MG  --  2.2     Recent Labs   Lab 12/06/24  1529 12/07/24  0502   WBC 5.85 5.76   HGB 9.0* 10.0*   HCT 28.7* 33.0*    272     No results for input(s): "LABPT", "INR", "APTT" in the last 48 hours.  Microbiology Results (last 7 days)       ** No results found for the last 168 hours. **          All pertinent labs from the last 24 hours have been reviewed.    Significant Diagnostics:  No new relevant imaging to discuss.   Assessment/Plan:     * Cerebral aneurysm  Isabel Coats is a 73-year-old female past medical history of hypertension, obstructive sleep apnea on CPAP, suggestive heart failure, obesity, history of gastric cancer status post chemoradiation and DVT in currently on Eliquis. She presents to [facility] s/p L pterional crani on 11/20 with 7 clips applied in proximity to large irregular L MCA bifurcation aneurysm and other adjacent small aneurysms, 1 clip applied across neck of aneurysm just proximal to anterior choroidal.  After surgery she seemed to be gradually waking up appropriately overnight, then AM of POD1 around shift change had an unexplained decline in exam during which stat CT showed IPH in the right side of her cerebellum. She became less responsive as well as developed a L gaze preference and dense aphasia. She had a perfusion scan with questionable perfusion deficit to the left anterior temporal lobe and milrinone was started with seemingly some clinical improvement. Now gaze preference resolved, following commands x4 antigravity, able to state name but still remains aphasic.    Imaging:  CTA Head 11/20: expected post-op changes  CTH 11/21 8 AM: right sided cerebellar IPH  CTA 11/21 10 AM: stable IPH, possible frontal contusion  CTP 11/21 4 PM: possible " left anterior temporal perfusion deficit in territory of branches distal to MCA clips   MRI brain w wo 11/22: no strokes, post op changes, stable cerebellar bleed. some edema in right frontal lobe.    Plans:  -Downgraded to NPU under NSGY  -q4h neurochecks/vitals  -SBP <160  -drain removed 11/25  -keppra 1g BID postop. cEEG negative during initial AMS POD1  -Cerebral vasospasm: weaned off milrinone prior to step down  - Continue daily TCDs x 14 days - completed.   - Keep euvolemic.   - Strict I/O. Please document I/O every 4 hours.   -PT/OT/OOB  -SLP - tolerating current diet but with poor intake. Continue SLP. Encourage PO fluids since NGT removed 11/28. Diet advanced to regular diet.   -Continue nutritional support with boost supplementation  -Continue to monitor clinically, notify NSGY immediately with any changes in neuro status   -SQH for DVT ppx    Dispo: pending placement to [facility]    Case and plan discussed with attending neurosurgeon Dr. Friedman    Oral thrush  - Dukes solution ordered QID  - Continue oral care     Abscess  Small R inner thigh abscess noted with seropurulent drainage.  Gen Surg consulted 11/29:  This is spontaneously and adequately draining. No significant fluctuance or induration.     -No surgical intervention indicated or recommended at this time as this area is draining well  -CTM. Wound Care following.  -Bactrim started 11/29, continue x 10 days    Pure hypercholesterolemia  - home lipitor 20       Chronic diastolic congestive heart failure    Latest ECHO  Results for orders placed during the hospital encounter of 10/22/24    Echo    Interpretation Summary    Left Ventricle: The left ventricle is normal in size. Normal wall thickness. There is normal systolic function with a visually estimated ejection fraction of 60 - 65%. There is normal diastolic function.    Right Ventricle: Normal right ventricular cavity size. Wall thickness is normal. Systolic function is normal.    Left Atrium: Left  atrium is severely dilated.    Right Atrium: Right atrium is dilated.    Aortic Valve: The aortic valve is a trileaflet valve. There is moderate aortic valve sclerosis. Mildly restricted motion.    Tricuspid Valve: There is mild regurgitation.    IVC/SVC: Normal venous pressure at 3 mmHg.    Current Heart Failure Medications  hydrALAZINE injection 10 mg, Every 4 hours PRN, Intravenous  labetalol 20 mg/4 mL (5 mg/mL) IV syring, Every 4 hours PRN, Intravenous  hydrALAZINE tablet 50 mg, Every 8 hours, Oral  lisinopriL tablet 40 mg, Daily, Oral    Plan  - Monitor strict I&Os every 4 hours and daily weights.    - On telemetry  - The patient's volume status is stable        Natasha Rivera PA-C  Neurosurgery  [facility] - Neurosurgery ([facility])

## 2024-12-07 NOTE — ASSESSMENT & PLAN NOTE
Small R inner thigh abscess noted with seropurulent drainage.  Gen Surg consulted 11/29:  This is spontaneously and adequately draining. No significant fluctuance or induration.     -No surgical intervention indicated or recommended at this time as this area is draining well  -CTM. Wound Care following.  -Bactrim started 11/29, continue x 10 days

## 2024-12-08 PROCEDURE — 97530 THERAPEUTIC ACTIVITIES: CPT | Mod: HCNC,CQ

## 2024-12-08 PROCEDURE — 11000001 HC ACUTE MED/SURG PRIVATE ROOM: Mod: HCNC

## 2024-12-08 PROCEDURE — 25000003 PHARM REV CODE 250: Mod: HCNC | Performed by: PHYSICIAN ASSISTANT

## 2024-12-08 PROCEDURE — 63600175 PHARM REV CODE 636 W HCPCS: Mod: HCNC | Performed by: NURSE PRACTITIONER

## 2024-12-08 RX ADMIN — GABAPENTIN 600 MG: 250 SOLUTION ORAL at 02:12

## 2024-12-08 RX ADMIN — CITALOPRAM HYDROBROMIDE 10 MG: 10 TABLET ORAL at 08:12

## 2024-12-08 RX ADMIN — GABAPENTIN 600 MG: 250 SOLUTION ORAL at 05:12

## 2024-12-08 RX ADMIN — HYDRALAZINE HYDROCHLORIDE 50 MG: 25 TABLET ORAL at 10:12

## 2024-12-08 RX ADMIN — GABAPENTIN 600 MG: 250 SOLUTION ORAL at 10:12

## 2024-12-08 RX ADMIN — ALUMINUM HYDROXIDE, MAGNESIUM HYDROXIDE, AND DIMETHICONE 10 ML: 400; 400; 40 SUSPENSION ORAL at 09:12

## 2024-12-08 RX ADMIN — ALUMINUM HYDROXIDE, MAGNESIUM HYDROXIDE, AND DIMETHICONE 10 ML: 400; 400; 40 SUSPENSION ORAL at 04:12

## 2024-12-08 RX ADMIN — LEVETIRACETAM 1000 MG: 500 SOLUTION ORAL at 08:12

## 2024-12-08 RX ADMIN — ALUMINUM HYDROXIDE, MAGNESIUM HYDROXIDE, AND DIMETHICONE 10 ML: 400; 400; 40 SUSPENSION ORAL at 08:12

## 2024-12-08 RX ADMIN — HEPARIN SODIUM 7500 UNITS: 5000 INJECTION INTRAVENOUS; SUBCUTANEOUS at 05:12

## 2024-12-08 RX ADMIN — LEVETIRACETAM 1000 MG: 500 SOLUTION ORAL at 09:12

## 2024-12-08 RX ADMIN — HEPARIN SODIUM 7500 UNITS: 5000 INJECTION INTRAVENOUS; SUBCUTANEOUS at 09:12

## 2024-12-08 RX ADMIN — SULFAMETHOXAZOLE AND TRIMETHOPRIM 1 TABLET: 800; 160 TABLET ORAL at 08:12

## 2024-12-08 RX ADMIN — HEPARIN SODIUM 7500 UNITS: 5000 INJECTION INTRAVENOUS; SUBCUTANEOUS at 02:12

## 2024-12-08 RX ADMIN — SULFAMETHOXAZOLE AND TRIMETHOPRIM 1 TABLET: 800; 160 TABLET ORAL at 09:12

## 2024-12-08 RX ADMIN — ATORVASTATIN CALCIUM 20 MG: 20 TABLET, FILM COATED ORAL at 08:12

## 2024-12-08 RX ADMIN — ALUMINUM HYDROXIDE, MAGNESIUM HYDROXIDE, AND DIMETHICONE 10 ML: 400; 400; 40 SUSPENSION ORAL at 01:12

## 2024-12-08 NOTE — SUBJECTIVE & OBJECTIVE
Interval History: 12/8: Neuro exam grossly stable. Pending SNF. No acute events    Medications:  Continuous Infusions:  Scheduled Meds:   atorvastatin  20 mg Oral Daily    citalopram  10 mg Oral Daily    duke's soln (benadryl 30 mL, mylanta 30 mL, LIDOcaine 30 mL, nystatin 30 mL) 120 mL  10 mL Oral QID    gabapentin  600 mg Oral Q8H    heparin (porcine)  7,500 Units Subcutaneous Q8H    hydrALAZINE  50 mg Oral Q8H    levetiracetam  1,000 mg Oral BID    lisinopriL  40 mg Oral Daily    sulfamethoxazole-trimethoprim 800-160mg  1 tablet Oral BID     PRN Meds:  Current Facility-Administered Medications:     acetaminophen, 650 mg, Oral, Q4H PRN    bisacodyL, 10 mg, Rectal, Daily PRN    hydrALAZINE, 10 mg, Intravenous, Q4H PRN    labetalol, 10 mg, Intravenous, Q4H PRN    morphine, 2 mg, Intravenous, Q6H PRN    naloxone, 0.4 mg, Intravenous, PRN    ondansetron, 4 mg, Intravenous, Q12H PRN    oxyCODONE, 5 mg, Oral, Q6H PRN    sodium chloride 0.9%, 10 mL, Intravenous, PRN     Review of Systems  Objective:     Weight: (!) 147.9 kg (326 lb)  Body mass index is 52.62 kg/m².  Vital Signs (Most Recent):  Temp: 98.7 °F (37.1 °C) (12/08/24 1123)  Pulse: (!) 59 (12/08/24 1123)  Resp: 18 (12/08/24 1123)  BP: (!) 102/54 (12/08/24 1123)  SpO2: 96 % (12/08/24 1123) Vital Signs (24h Range):  Temp:  [97.7 °F (36.5 °C)-98.8 °F (37.1 °C)] 98.7 °F (37.1 °C)  Pulse:  [] 59  Resp:  [16-24] 18  SpO2:  [94 %-98 %] 96 %  BP: ()/(53-59) 102/54     Date 12/08/24 0700 - 12/09/24 0659   Shift 3609-8227 4600-2153 7236-2825 24 Hour Total   INTAKE   Shift Total(mL/kg)       OUTPUT   Urine(mL/kg/hr) 100   100   Shift Total(mL/kg) 100(0.7)   100(0.7)   Weight (kg) 147.9 147.9 147.9 147.9                       Female External Urinary Catheter w/ Suction 11/21/24 1400 (Active)   Skin no redness;no breakdown 12/08/24 0800   Tolerance no signs/symptoms of discomfort 12/08/24 0800   Suction Continuous suction at 60 mmHg 12/08/24 0800   Date of last  "wick change 12/08/24 12/08/24 0800   Time of last wick change 1000 12/08/24 0800   Output (mL) 100 mL 12/08/24 0800          Physical Exam         Neurosurgery Physical Exam    [id number], oriented x2 with choices, aphasia improving, PERRL, EOMI (gaze pref resolved), FC x4 AG     Significant Labs:  Recent Labs   Lab 12/06/24  1529 12/07/24  0502    87    137   K 4.6 4.9   * 113*   CO2 21* 19*   BUN 22 20   CREATININE 1.1 0.9   CALCIUM 10.0 10.1   MG  --  2.2     Recent Labs   Lab 12/06/24  1529 12/07/24  0502   WBC 5.85 5.76   HGB 9.0* 10.0*   HCT 28.7* 33.0*    272     No results for input(s): "LABPT", "INR", "APTT" in the last 48 hours.  Microbiology Results (last 7 days)       ** No results found for the last 168 hours. **          All pertinent labs from the last 24 hours have been reviewed.    Significant Diagnostics:  I have reviewed all pertinent imaging results/findings within the past 24 hours.  "

## 2024-12-08 NOTE — PT/OT/SLP PROGRESS
"Physical Therapy Treatment    Patient Name:  Ca Coats   MRN:  [id number]    Recommendations:     Discharge Recommendations: High Intensity Therapy  Discharge Equipment Recommendations: hospital bed, lift device, wheelchair  Barriers to discharge: None    Assessment:     Ca Coats is a 73 y.o. female admitted with a medical diagnosis of Cerebral aneurysm.  She presents with the following impairments/functional limitations: weakness, impaired endurance, impaired self care skills, impaired functional mobility, gait instability, impaired balance, impaired cognition, decreased lower extremity function. Assist from therapy tech to mobilize pt safely, required mod A x 1 to sit EOB today w/mod A x 2 to stand and take side steps, reoriented pt prior to activity, pt only able to recall name    Rehab Prognosis: Fair; patient would benefit from acute skilled PT services to address these deficits and reach maximum level of function.    Recent Surgery: Procedure(s) (LRB):  CRANIOTOMY, WITH ANEURYSM CLIPPING W/ STEALTH (Left)  CRANIOTOMY, FOR ANEURYSM OF VERTEBROBASILAR OR CAROTID CIRCULATION (Left)  DISSECTION, NERVE, USING OPERATING MICROSCOPE (Left) 18 Days Post-Op    Plan:     During this hospitalization, patient to be seen 4 x/week to address the identified rehab impairments via gait training, therapeutic activities, therapeutic exercises, neuromuscular re-education and progress toward the following goals:    Plan of Care Expires:  12/24/24    Subjective     Chief Complaint: no complaints  Patient/Family Comments/goals: "I want a drink"  Pain/Comfort:  Pain Rating 1: 0/10  Pain Rating Post-Intervention 1: 0/10      Objective:     Communicated with RN prior to session.  Patient found supine with telemetry, PureWick, peripheral IV upon PT entry to room.     General Precautions: Standard, fall  Orthopedic Precautions: N/A  Braces: N/A  Respiratory Status: Room air     Functional Mobility:    Bed " Mobility:   Rolling: to L/R with moderate assistance  Supine > Sit: moderate assistance  Sit > Supine: moderate assistance    Transfers:   Sit <> Stand Transfer: moderate assistance and of 2 persons from EOB without AD; therapist on either side, pt performs sit <> stand x 2, performs side steps first attempt, able to maintain stand for 30s second attempt    Balance:   Sitting balance: FAIR+: Maintains balance through MINIMAL excursions of active trunk motion; pt performs sit and reach therex, maintains balance w/minimal excursions but has difficulty w/hand eye coordination, able to hit 2/6, has most difficulty w/reaching above head, frequent VC's required for redirection and encouragement, able to reach across body  Standing balance:   POOR: Needs MODERATE assist to maintain  POOR: Needs MOD (moderate) assist during gait                 Gait:  Distance: 2 side steps   Assistive Device: Therapists on either side  Assistance Level: moderate assistance and of 2 persons  Gait Assessment: 2 side steps             AM-PAC 6 CLICK MOBILITY  Turning over in bed (including adjusting bedclothes, sheets and blankets)?: 2  Sitting down on and standing up from a chair with arms (e.g., wheelchair, bedside commode, etc.): 2  Moving from lying on back to sitting on the side of the bed?: 2  Moving to and from a bed to a chair (including a wheelchair)?: 2  Need to walk in hospital room?: 2  Climbing 3-5 steps with a railing?: 1  Basic Mobility Total Score: 11       Treatment & Education:  Education:  PT role and PoC to increase strength and personal mobility    Therex:LAQ x 10    Patient left supine with all lines intact and call button in reach..    GOALS:   Multidisciplinary Problems       Physical Therapy Goals          Problem: Physical Therapy    Goal Priority Disciplines Outcome Interventions   Physical Therapy Goal     PT, PT/OT Progressing    Description: Goals to be met by: 12/24/2024     Patient will increase functional  independence with mobility by performin. Supine to sit with MInimal Assistance  2. Sit to supine with Minimal Assistance  3. Sit to stand transfer with Moderate Assistance  4. Bed to chair transfer with Maximum Assistance using LRAD  5. Gait  x 5 feet with Maximum Assistance using LRAD.   6. Sitting at edge of bed x5 minutes with Stand-by Assistance  7. Lower extremity exercise program x15 reps per handout, with assistance as needed    Hospital Bed - Patient requires a hospital bed for positioning of the body in ways that are not feasible with an ordinary bed. The patient requires special positioning for pain relief, limited mobility, and/or being unable to independently make changes in body position without the use of a hospital bed. Pillows and wedges will not be adequate for resolving these positional issues.     Wheelchair - Patient has a mobility limitation that significantly impairs their ability to participate in one or more mobility related activities of daily living in customary locations in the home. The mobility limitation cannot be sufficiently resolved by the use of a cane or walker. The use of a manual wheelchair will greatly improve the patient's ability to participate in MRADLs. The patient will use the wheelchair on a regular basis at home. They have expressed their willingness to use a manual wheelchair in the home, and have a caregiver who is available and willing to assist with the wheelchair if needed                         Time Tracking:     PT Received On: 24  PT Start Time: 1122     PT Stop Time: 1145  PT Total Time (min): 23 min     Billable Minutes: Therapeutic Activity 23min    Treatment Type: Treatment  PT/PTA: PTA     Number of PTA visits since last PT visit: 2     2024

## 2024-12-08 NOTE — ASSESSMENT & PLAN NOTE
Isabel Coats is a 73-year-old female past medical history of hypertension, obstructive sleep apnea on CPAP, suggestive heart failure, obesity, history of gastric cancer status post chemoradiation and DVT in currently on Eliquis. She presents to [facility] s/p L pterional crani on 11/20 with 7 clips applied in proximity to large irregular L MCA bifurcation aneurysm and other adjacent small aneurysms, 1 clip applied across neck of aneurysm just proximal to anterior choroidal.  After surgery she seemed to be gradually waking up appropriately overnight, then AM of POD1 around shift change had an unexplained decline in exam during which stat CT showed IPH in the right side of her cerebellum. She became less responsive as well as developed a L gaze preference and dense aphasia. She had a perfusion scan with questionable perfusion deficit to the left anterior temporal lobe and milrinone was started with seemingly some clinical improvement. Now gaze preference resolved, following commands x4 antigravity, able to state name but still remains aphasic.    Imaging:  CTA Head 11/20: expected post-op changes  CTH 11/21 8 AM: right sided cerebellar IPH  CTA 11/21 10 AM: stable IPH, possible frontal contusion  CTP 11/21 4 PM: possible left anterior temporal perfusion deficit in territory of branches distal to MCA clips   MRI brain w wo 11/22: no strokes, post op changes, stable cerebellar bleed. some edema in right frontal lobe.    Plans:  -Admitted to NSGY floor, q4h nc/vs  -SBP <160  -drain removed 11/25  -keppra 1g BID postop. cEEG negative during initial AMS POD1  -Cerebral vasospasm: weaned off milrinone prior to step down  - No need for daily TCDs  - Keep euvolemic. Strict I/O's  -PT/OT/OOB  -SLP - tolerating current diet but with poor intake. Continue SLP. Encourage PO fluids since NGT removed 11/28. Diet advanced to regular diet.   -Continue nutritional support with boost supplementation  -Continue to monitor clinically, notify  NSGY immediately with any changes in neuro status   -SQH for DVT ppx    Dispo: pending SNF    Case and plan discussed with attending neurosurgeon Dr. Friedman

## 2024-12-08 NOTE — PROGRESS NOTES
[facility] - Neurosurgery ([facility])  Neurosurgery  Progress Note    Subjective:     History of Present Illness: Isabel Coats is a 73-year-old female past medical history of hypertension, obstructive sleep apnea on CPAP, suggestive heart failure, obesity, history of gastric cancer status post chemoradiation and DVT in currently on Eliquis. She presents to [facility] L pterional crani, 7 clips applied in proximity to large irregular L MCA bifurcation aneurysm and other adjacent small aneurysms, 1 clip applied across neck of aneurysm just proximal to anterior choroidal.  She also has a current smoker she was initially referred to Neurology for workup for dizziness. She had been complaining of vertigo multiple times a day for the past several months, associated with shortness of breath, palpitation, nausea, headache, and feeling faint. She is admitted to [facility] for a higher level of care.     Post-Op Info:  Procedure(s) (LRB):  CRANIOTOMY, WITH ANEURYSM CLIPPING W/ STEALTH (Left)  CRANIOTOMY, FOR ANEURYSM OF VERTEBROBASILAR OR CAROTID CIRCULATION (Left)  DISSECTION, NERVE, USING OPERATING MICROSCOPE (Left)   18 Days Post-Op   Interval History: 12/8: Neuro exam grossly stable. Pending SNF. No acute events    Medications:  Continuous Infusions:  Scheduled Meds:   atorvastatin  20 mg Oral Daily    citalopram  10 mg Oral Daily    duke's soln (benadryl 30 mL, mylanta 30 mL, LIDOcaine 30 mL, nystatin 30 mL) 120 mL  10 mL Oral QID    gabapentin  600 mg Oral Q8H    heparin (porcine)  7,500 Units Subcutaneous Q8H    hydrALAZINE  50 mg Oral Q8H    levetiracetam  1,000 mg Oral BID    lisinopriL  40 mg Oral Daily    sulfamethoxazole-trimethoprim 800-160mg  1 tablet Oral BID     PRN Meds:  Current Facility-Administered Medications:     acetaminophen, 650 mg, Oral, Q4H PRN    bisacodyL, 10 mg, Rectal, Daily PRN    hydrALAZINE, 10 mg, Intravenous, Q4H PRN    labetalol, 10 mg, Intravenous, Q4H PRN    morphine, 2 mg, Intravenous, Q6H PRN     "naloxone, 0.4 mg, Intravenous, PRN    ondansetron, 4 mg, Intravenous, Q12H PRN    oxyCODONE, 5 mg, Oral, Q6H PRN    sodium chloride 0.9%, 10 mL, Intravenous, PRN     Review of Systems  Objective:     Weight: (!) 147.9 kg (326 lb)  Body mass index is 52.62 kg/m².  Vital Signs (Most Recent):  Temp: 98.7 °F (37.1 °C) (12/08/24 1123)  Pulse: (!) 59 (12/08/24 1123)  Resp: 18 (12/08/24 1123)  BP: (!) 102/54 (12/08/24 1123)  SpO2: 96 % (12/08/24 1123) Vital Signs (24h Range):  Temp:  [97.7 °F (36.5 °C)-98.8 °F (37.1 °C)] 98.7 °F (37.1 °C)  Pulse:  [] 59  Resp:  [16-24] 18  SpO2:  [94 %-98 %] 96 %  BP: ()/(53-59) 102/54     Date 12/08/24 0700 - 12/09/24 0659   Shift 6241-0966 1256-5978 2457-7410 24 Hour Total   INTAKE   Shift Total(mL/kg)       OUTPUT   Urine(mL/kg/hr) 100   100   Shift Total(mL/kg) 100(0.7)   100(0.7)   Weight (kg) 147.9 147.9 147.9 147.9                       Female External Urinary Catheter w/ Suction 11/21/24 1400 (Active)   Skin no redness;no breakdown 12/08/24 0800   Tolerance no signs/symptoms of discomfort 12/08/24 0800   Suction Continuous suction at 60 mmHg 12/08/24 0800   Date of last wick change 12/08/24 12/08/24 0800   Time of last wick change 1000 12/08/24 0800   Output (mL) 100 mL 12/08/24 0800          Physical Exam         Neurosurgery Physical Exam    [id number], oriented x2 with choices, aphasia improving, PERRL, EOMI (gaze pref resolved), FC x4 AG     Significant Labs:  Recent Labs   Lab 12/06/24  1529 12/07/24  0502    87    137   K 4.6 4.9   * 113*   CO2 21* 19*   BUN 22 20   CREATININE 1.1 0.9   CALCIUM 10.0 10.1   MG  --  2.2     Recent Labs   Lab 12/06/24  1529 12/07/24  0502   WBC 5.85 5.76   HGB 9.0* 10.0*   HCT 28.7* 33.0*    272     No results for input(s): "LABPT", "INR", "APTT" in the last 48 hours.  Microbiology Results (last 7 days)       ** No results found for the last 168 hours. **          All pertinent labs from the last 24 hours have " been reviewed.    Significant Diagnostics:  I have reviewed all pertinent imaging results/findings within the past 24 hours.  Assessment/Plan:     * Cerebral aneurysm  Isabel Coats is a 73-year-old female past medical history of hypertension, obstructive sleep apnea on CPAP, suggestive heart failure, obesity, history of gastric cancer status post chemoradiation and DVT in currently on Eliquis. She presents to [facility] s/p L pterional crani on 11/20 with 7 clips applied in proximity to large irregular L MCA bifurcation aneurysm and other adjacent small aneurysms, 1 clip applied across neck of aneurysm just proximal to anterior choroidal.  After surgery she seemed to be gradually waking up appropriately overnight, then AM of POD1 around shift change had an unexplained decline in exam during which stat CT showed IPH in the right side of her cerebellum. She became less responsive as well as developed a L gaze preference and dense aphasia. She had a perfusion scan with questionable perfusion deficit to the left anterior temporal lobe and milrinone was started with seemingly some clinical improvement. Now gaze preference resolved, following commands x4 antigravity, able to state name but still remains aphasic.    Imaging:  CTA Head 11/20: expected post-op changes  CTH 11/21 8 AM: right sided cerebellar IPH  CTA 11/21 10 AM: stable IPH, possible frontal contusion  CTP 11/21 4 PM: possible left anterior temporal perfusion deficit in territory of branches distal to MCA clips   MRI brain w wo 11/22: no strokes, post op changes, stable cerebellar bleed. some edema in right frontal lobe.    Plans:  -Admitted to NSGY floor, q4h nc/vs  -SBP <160  -drain removed 11/25  -keppra 1g BID postop. cEEG negative during initial AMS POD1  -Cerebral vasospasm: weaned off milrinone prior to step down  - No need for daily TCDs  - Keep euvolemic. Strict I/O's  -PT/OT/OOB  -SLP - tolerating current diet but with poor intake. Continue SLP.  Encourage PO fluids since NGT removed 11/28. Diet advanced to regular diet.   -Continue nutritional support with boost supplementation  -Continue to monitor clinically, notify NSGY immediately with any changes in neuro status   -SQH for DVT ppx    Dispo: pending SNF    Case and plan discussed with attending neurosurgeon Dr. Elder Rodríguez MD  Neurosurgery  [facility] - Neurosurgery ([facility])

## 2024-12-08 NOTE — PLAN OF CARE
Problem: Adult Inpatient Plan of Care  Goal: Plan of Care Review  Outcome: Progressing  Goal: Patient-Specific Goal (Individualized)  Outcome: Progressing  Goal: Optimal Comfort and Wellbeing  Outcome: Progressing  Goal: Readiness for Transition of Care  Outcome: Progressing     Problem: Adult Inpatient Plan of Care  Goal: Plan of Care Review  Outcome: Progressing     Problem: Adult Inpatient Plan of Care  Goal: Patient-Specific Goal (Individualized)  Outcome: Progressing     Problem: Adult Inpatient Plan of Care  Goal: Optimal Comfort and Wellbeing  Outcome: Progressing     Problem: Adult Inpatient Plan of Care  Goal: Patient-Specific Goal (Individualized)  Outcome: Progressing     Problem: Adult Inpatient Plan of Care  Goal: Readiness for Transition of Care  Outcome: Progressing     Problem: Wound  Goal: Optimal Functional Ability  Outcome: Progressing  Goal: Skin Health and Integrity  Outcome: Progressing     Problem: Wound  Goal: Optimal Functional Ability  Outcome: Progressing     Problem: Wound  Goal: Skin Health and Integrity  Outcome: Progressing       POC reviewed with patient at the beside. Verbalization of understanding voiced. Questions and concerns addressed. Precautions maintained. No events noted at present during this shift. Cardiac monitoring ongoing. Vital signs all shift.  Upon exiting room bed low and locked with call light within reach and bed alarm activated. POC ongoing.

## 2024-12-09 PROBLEM — I82.411 DVT OF DEEP FEMORAL VEIN, RIGHT: Status: RESOLVED | Noted: 2023-05-29 | Resolved: 2024-12-09

## 2024-12-09 LAB
ALBUMIN SERPL BCP-MCNC: 2.9 G/DL (ref 3.5–5.2)
ALP SERPL-CCNC: 114 U/L (ref 40–150)
ALT SERPL W/O P-5'-P-CCNC: 29 U/L (ref 10–44)
ANION GAP SERPL CALC-SCNC: 6 MMOL/L (ref 8–16)
AST SERPL-CCNC: 28 U/L (ref 10–40)
BASOPHILS # BLD AUTO: 0.03 K/UL (ref 0–0.2)
BASOPHILS NFR BLD: 0.6 % (ref 0–1.9)
BILIRUB SERPL-MCNC: 0.3 MG/DL (ref 0.1–1)
BUN SERPL-MCNC: 24 MG/DL (ref 8–23)
CALCIUM SERPL-MCNC: 10 MG/DL (ref 8.7–10.5)
CHLORIDE SERPL-SCNC: 112 MMOL/L (ref 95–110)
CO2 SERPL-SCNC: 16 MMOL/L (ref 23–29)
CREAT SERPL-MCNC: 1 MG/DL (ref 0.5–1.4)
DIFFERENTIAL METHOD BLD: ABNORMAL
EOSINOPHIL # BLD AUTO: 0.3 K/UL (ref 0–0.5)
EOSINOPHIL NFR BLD: 6 % (ref 0–8)
ERYTHROCYTE [DISTWIDTH] IN BLOOD BY AUTOMATED COUNT: 13.6 % (ref 11.5–14.5)
EST. GFR  (NO RACE VARIABLE): >60 ML/MIN/1.73 M^2
GLUCOSE SERPL-MCNC: 91 MG/DL (ref 70–110)
HCT VFR BLD AUTO: 31.3 % (ref 37–48.5)
HGB BLD-MCNC: 9.8 G/DL (ref 12–16)
IMM GRANULOCYTES # BLD AUTO: 0.02 K/UL (ref 0–0.04)
IMM GRANULOCYTES NFR BLD AUTO: 0.4 % (ref 0–0.5)
LYMPHOCYTES # BLD AUTO: 1.6 K/UL (ref 1–4.8)
LYMPHOCYTES NFR BLD: 31.2 % (ref 18–48)
MAGNESIUM SERPL-MCNC: 2 MG/DL (ref 1.6–2.6)
MCH RBC QN AUTO: 32.8 PG (ref 27–31)
MCHC RBC AUTO-ENTMCNC: 31.3 G/DL (ref 32–36)
MCV RBC AUTO: 105 FL (ref 82–98)
MONOCYTES # BLD AUTO: 1 K/UL (ref 0.3–1)
MONOCYTES NFR BLD: 19 % (ref 4–15)
NEUTROPHILS # BLD AUTO: 2.2 K/UL (ref 1.8–7.7)
NEUTROPHILS NFR BLD: 42.8 % (ref 38–73)
NRBC BLD-RTO: 0 /100 WBC
PHOSPHATE SERPL-MCNC: 2.8 MG/DL (ref 2.7–4.5)
PLATELET # BLD AUTO: 262 K/UL (ref 150–450)
PMV BLD AUTO: 10.8 FL (ref 9.2–12.9)
POTASSIUM SERPL-SCNC: 5.1 MMOL/L (ref 3.5–5.1)
PROT SERPL-MCNC: 6.3 G/DL (ref 6–8.4)
RBC # BLD AUTO: 2.99 M/UL (ref 4–5.4)
SODIUM SERPL-SCNC: 134 MMOL/L (ref 136–145)
WBC # BLD AUTO: 5.16 K/UL (ref 3.9–12.7)

## 2024-12-09 PROCEDURE — 80053 COMPREHEN METABOLIC PANEL: CPT | Mod: HCNC | Performed by: PHYSICIAN ASSISTANT

## 2024-12-09 PROCEDURE — 85025 COMPLETE CBC W/AUTO DIFF WBC: CPT | Mod: HCNC | Performed by: PHYSICIAN ASSISTANT

## 2024-12-09 PROCEDURE — 11000001 HC ACUTE MED/SURG PRIVATE ROOM: Mod: HCNC

## 2024-12-09 PROCEDURE — 25000003 PHARM REV CODE 250: Mod: HCNC | Performed by: PHYSICIAN ASSISTANT

## 2024-12-09 PROCEDURE — 92507 TX SP LANG VOICE COMM INDIV: CPT | Mod: HCNC

## 2024-12-09 PROCEDURE — 97530 THERAPEUTIC ACTIVITIES: CPT | Mod: HCNC

## 2024-12-09 PROCEDURE — 97535 SELF CARE MNGMENT TRAINING: CPT | Mod: HCNC

## 2024-12-09 PROCEDURE — 83735 ASSAY OF MAGNESIUM: CPT | Mod: HCNC | Performed by: PHYSICIAN ASSISTANT

## 2024-12-09 PROCEDURE — 63600175 PHARM REV CODE 636 W HCPCS: Mod: HCNC | Performed by: NURSE PRACTITIONER

## 2024-12-09 PROCEDURE — 84100 ASSAY OF PHOSPHORUS: CPT | Mod: HCNC | Performed by: PHYSICIAN ASSISTANT

## 2024-12-09 PROCEDURE — 99222 1ST HOSP IP/OBS MODERATE 55: CPT | Mod: HCNC,,, | Performed by: NURSE PRACTITIONER

## 2024-12-09 PROCEDURE — 36415 COLL VENOUS BLD VENIPUNCTURE: CPT | Mod: HCNC | Performed by: PHYSICIAN ASSISTANT

## 2024-12-09 RX ADMIN — LEVETIRACETAM 1000 MG: 500 SOLUTION ORAL at 09:12

## 2024-12-09 RX ADMIN — HEPARIN SODIUM 7500 UNITS: 5000 INJECTION INTRAVENOUS; SUBCUTANEOUS at 02:12

## 2024-12-09 RX ADMIN — GABAPENTIN 600 MG: 250 SOLUTION ORAL at 05:12

## 2024-12-09 RX ADMIN — LEVETIRACETAM 1000 MG: 500 SOLUTION ORAL at 08:12

## 2024-12-09 RX ADMIN — HYDRALAZINE HYDROCHLORIDE 50 MG: 25 TABLET ORAL at 02:12

## 2024-12-09 RX ADMIN — SULFAMETHOXAZOLE AND TRIMETHOPRIM 1 TABLET: 800; 160 TABLET ORAL at 09:12

## 2024-12-09 RX ADMIN — GABAPENTIN 600 MG: 250 SOLUTION ORAL at 02:12

## 2024-12-09 RX ADMIN — HEPARIN SODIUM 7500 UNITS: 5000 INJECTION INTRAVENOUS; SUBCUTANEOUS at 05:12

## 2024-12-09 RX ADMIN — GABAPENTIN 600 MG: 250 SOLUTION ORAL at 09:12

## 2024-12-09 RX ADMIN — ALUMINUM HYDROXIDE, MAGNESIUM HYDROXIDE, AND DIMETHICONE 10 ML: 400; 400; 40 SUSPENSION ORAL at 09:12

## 2024-12-09 RX ADMIN — HYDRALAZINE HYDROCHLORIDE 50 MG: 25 TABLET ORAL at 09:12

## 2024-12-09 RX ADMIN — CITALOPRAM HYDROBROMIDE 10 MG: 10 TABLET ORAL at 09:12

## 2024-12-09 RX ADMIN — ALUMINUM HYDROXIDE, MAGNESIUM HYDROXIDE, AND DIMETHICONE 10 ML: 400; 400; 40 SUSPENSION ORAL at 02:12

## 2024-12-09 RX ADMIN — ALUMINUM HYDROXIDE, MAGNESIUM HYDROXIDE, AND DIMETHICONE 10 ML: 400; 400; 40 SUSPENSION ORAL at 08:12

## 2024-12-09 RX ADMIN — ALUMINUM HYDROXIDE, MAGNESIUM HYDROXIDE, AND DIMETHICONE 10 ML: 400; 400; 40 SUSPENSION ORAL at 05:12

## 2024-12-09 RX ADMIN — HEPARIN SODIUM 7500 UNITS: 5000 INJECTION INTRAVENOUS; SUBCUTANEOUS at 09:12

## 2024-12-09 RX ADMIN — LISINOPRIL 40 MG: 20 TABLET ORAL at 09:12

## 2024-12-09 RX ADMIN — ATORVASTATIN CALCIUM 20 MG: 20 TABLET, FILM COATED ORAL at 09:12

## 2024-12-09 NOTE — SUBJECTIVE & OBJECTIVE
Interval History: 12/9: NAEO. AFVSS. Denies any complaints this AM. Sitting up eating breakfast. Exam stable. Pending dispo.     Medications:  Continuous Infusions:  Scheduled Meds:   atorvastatin  20 mg Oral Daily    citalopram  10 mg Oral Daily    duke's soln (benadryl 30 mL, mylanta 30 mL, LIDOcaine 30 mL, nystatin 30 mL) 120 mL  10 mL Oral QID    gabapentin  600 mg Oral Q8H    heparin (porcine)  7,500 Units Subcutaneous Q8H    hydrALAZINE  50 mg Oral Q8H    levetiracetam  1,000 mg Oral BID    lisinopriL  40 mg Oral Daily     PRN Meds:  Current Facility-Administered Medications:     acetaminophen, 650 mg, Oral, Q4H PRN    bisacodyL, 10 mg, Rectal, Daily PRN    hydrALAZINE, 10 mg, Intravenous, Q4H PRN    labetalol, 10 mg, Intravenous, Q4H PRN    morphine, 2 mg, Intravenous, Q6H PRN    naloxone, 0.4 mg, Intravenous, PRN    ondansetron, 4 mg, Intravenous, Q12H PRN    oxyCODONE, 5 mg, Oral, Q6H PRN    sodium chloride 0.9%, 10 mL, Intravenous, PRN     Objective:     Weight: (!) 147.9 kg (326 lb)  Body mass index is 52.62 kg/m².  Vital Signs (Most Recent):  Temp: 98 °F (36.7 °C) (12/09/24 0745)  Pulse: 74 (12/09/24 0745)  Resp: 18 (12/09/24 0745)  BP: (!) 109/57 (12/09/24 0745)  SpO2: 96 % (12/09/24 0745) Vital Signs (24h Range):  Temp:  [97.9 °F (36.6 °C)-98.7 °F (37.1 °C)] 98 °F (36.7 °C)  Pulse:  [57-85] 74  Resp:  [18] 18  SpO2:  [93 %-96 %] 96 %  BP: (102-124)/(54-61) 109/57                         Female External Urinary Catheter w/ Suction 11/21/24 1400 (Active)   Skin no redness;no breakdown 12/09/24 0600   Tolerance no signs/symptoms of discomfort 12/09/24 0600   Suction Continuous suction at 60 mmHg 12/08/24 2000   Date of last wick change 12/08/24 12/08/24 0800   Time of last wick change 1000 12/08/24 0800   Output (mL) 200 mL 12/09/24 0502      Physical Exam  General: well developed, well nourished, no distress.   Head: normocephalic, cranial incision c/d/I with chromic suture  Mental Status: Awake, Alert,  "Oriented x 2  Speech: Clear with content appropriate to conversation, mild aphasia  Cranial nerves: face symmetric, CN II-XII grossly intact.   Eyes: pupils equal, round, reactive to light, EOMI.  Sensory: intact to light touch throughout  Motor Strength: Moves all extremities spontaneously with good tone. At least antigravity strength in upper and lower extremities. No focal weakness identified. No abnormal movements seen.   Follows commands x 4 extremities  Skin: Skin is warm, dry and intact.      Significant Labs:  Recent Labs   Lab 12/09/24  0522   GLU 91   *   K 5.1   *   CO2 16*   BUN 24*   CREATININE 1.0   CALCIUM 10.0   MG 2.0     Recent Labs   Lab 12/09/24  0522   WBC 5.16   HGB 9.8*   HCT 31.3*        No results for input(s): "LABPT", "INR", "APTT" in the last 48 hours.  Microbiology Results (last 7 days)       ** No results found for the last 168 hours. **          All pertinent labs from the last 24 hours have been reviewed.    Significant Diagnostics:  No new relevant imaging to discuss.   "

## 2024-12-09 NOTE — PROGRESS NOTES
Dario Glover - Neurosurgery ([facility])  Neurosurgery  Progress Note    Subjective:     History of Present Illness: Isabel Coats is a 73-year-old female past medical history of hypertension, obstructive sleep apnea on CPAP, suggestive heart failure, obesity, history of gastric cancer status post chemoradiation and DVT in currently on Eliquis. She presents to [facility] L pterional crani, 7 clips applied in proximity to large irregular L MCA bifurcation aneurysm and other adjacent small aneurysms, 1 clip applied across neck of aneurysm just proximal to anterior choroidal.  She also has a current smoker she was initially referred to Neurology for workup for dizziness. She had been complaining of vertigo multiple times a day for the past several months, associated with shortness of breath, palpitation, nausea, headache, and feeling faint. She is admitted to [facility] for a higher level of care.     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 AFVSS. Denies any complaints this AM. Sitting up eating breakfast. Exam stable. Pending dispo.     Medications:  Continuous Infusions:  Scheduled Meds:   atorvastatin  20 mg Oral Daily    citalopram  10 mg Oral Daily    duke's soln (benadryl 30 mL, mylanta 30 mL, LIDOcaine 30 mL, nystatin 30 mL) 120 mL  10 mL Oral QID    gabapentin  600 mg Oral Q8H    heparin (porcine)  7,500 Units Subcutaneous Q8H    hydrALAZINE  50 mg Oral Q8H    levetiracetam  1,000 mg Oral BID    lisinopriL  40 mg Oral Daily     PRN Meds:  Current Facility-Administered Medications:     acetaminophen, 650 mg, Oral, Q4H PRN    bisacodyL, 10 mg, Rectal, Daily PRN    hydrALAZINE, 10 mg, Intravenous, Q4H PRN    labetalol, 10 mg, Intravenous, Q4H PRN    morphine, 2 mg, Intravenous, Q6H PRN    naloxone, 0.4 mg,  "Intravenous, PRN    ondansetron, 4 mg, Intravenous, Q12H PRN    oxyCODONE, 5 mg, Oral, Q6H PRN    sodium chloride 0.9%, 10 mL, Intravenous, PRN     Objective:     Weight: (!) 147.9 kg (326 lb)  Body mass index is 52.62 kg/m².  Vital Signs (Most Recent):  Temp: 98 °F (36.7 °C) (12/09/24 0745)  Pulse: 74 (12/09/24 0745)  Resp: 18 (12/09/24 0745)  BP: (!) 109/57 (12/09/24 0745)  SpO2: 96 % (12/09/24 0745) Vital Signs (24h Range):  Temp:  [97.9 °F (36.6 °C)-98.7 °F (37.1 °C)] 98 °F (36.7 °C)  Pulse:  [57-85] 74  Resp:  [18] 18  SpO2:  [93 %-96 %] 96 %  BP: (102-124)/(54-61) 109/57                         Female External Urinary Catheter w/ Suction 11/21/24 1400 (Active)   Skin no redness;no breakdown 12/09/24 0600   Tolerance no signs/symptoms of discomfort 12/09/24 0600   Suction Continuous suction at 60 mmHg 12/08/24 2000   Date of last wick change 12/08/24 12/08/24 0800   Time of last wick change 1000 12/08/24 0800   Output (mL) 200 mL 12/09/24 0502      Physical Exam  General: well developed, well nourished, no distress.   Head: normocephalic, cranial incision c/d/I with chromic suture  Mental Status: Awake, Alert, Oriented x 2  Speech: Clear with content appropriate to conversation, mild aphasia  Cranial nerves: face symmetric, CN II-XII grossly intact.   Eyes: pupils equal, round, reactive to light, EOMI.  Sensory: intact to light touch throughout  Motor Strength: Moves all extremities spontaneously with good tone. At least antigravity strength in upper and lower extremities. No focal weakness identified. No abnormal movements seen.   Follows commands x 4 extremities  Skin: Skin is warm, dry and intact.      Significant Labs:  Recent Labs   Lab 12/09/24  0522   GLU 91   *   K 5.1   *   CO2 16*   BUN 24*   CREATININE 1.0   CALCIUM 10.0   MG 2.0     Recent Labs   Lab 12/09/24  0522   WBC 5.16   HGB 9.8*   HCT 31.3*        No results for input(s): "LABPT", "INR", "APTT" in the last 48 " hours.  Microbiology Results (last 7 days)       ** No results found for the last 168 hours. **          All pertinent labs from the last 24 hours have been reviewed.    Significant Diagnostics:  No new relevant imaging to discuss.   Assessment/Plan:     * Cerebral aneurysm  Isabel Coats is a 73-year-old female past medical history of hypertension, obstructive sleep apnea on CPAP, suggestive heart failure, obesity, history of gastric cancer status post chemoradiation and DVT in currently on Eliquis. She presents to [facility] s/p L pterional crani on 11/20 with 7 clips applied in proximity to large irregular L MCA bifurcation aneurysm and other adjacent small aneurysms, 1 clip applied across neck of aneurysm just proximal to anterior choroidal.  After surgery she seemed to be gradually waking up appropriately overnight, then AM of POD1 around shift change had an unexplained decline in exam during which stat CT showed IPH in the right side of her cerebellum. She became less responsive as well as developed a L gaze preference and dense aphasia. She had a perfusion scan with questionable perfusion deficit to the left anterior temporal lobe and milrinone was started with seemingly some clinical improvement. Now gaze preference resolved, following commands x4 antigravity, able to state name but still remains aphasic.    Imaging:  CTA Head 11/20: expected post-op changes  CTH 11/21 8 AM: right sided cerebellar IPH  CTA 11/21 10 AM: stable IPH, possible frontal contusion  CTP 11/21 4 PM: possible left anterior temporal perfusion deficit in territory of branches distal to MCA clips   MRI brain w wo 11/22: no strokes, post op changes, stable cerebellar bleed. some edema in right frontal lobe.    Plans:  -Admitted to NSGY floor, q4h nc/vs  -SBP <160  -drain removed 11/25  -keppra 1g BID postop. cEEG negative during initial AMS POD1  -Cerebral vasospasm: weaned off milrinone prior to step down  - Daily TCDs completed  - Keep  euvolemic. Strict I/O's  -PT/OT/OOB  -SLP - tolerating current diet but with poor intake. Continue SLP. Encourage PO fluids since NGT removed 11/28. Diet advanced to regular diet.   -Continue nutritional support with boost supplementation  -Continue to monitor clinically, notify NSGY immediately with any changes in neuro status   -SQ for DVT ppx    Dispo: pending dispo    Case and plan discussed with attending neurosurgeon Dr. Friedman    Oral thrush  - Dukes solution ordered QID  - Continue oral care     Abscess  Small R inner thigh abscess noted with seropurulent drainage.  Gen Surg consulted 11/29:  This is spontaneously and adequately draining. No significant fluctuance or induration.     -No surgical intervention indicated or recommended at this time as this area is draining well  -CTM. Wound Care following.  -Bactrim started 11/29, continue x 10 days    Pure hypercholesterolemia  - home lipitor 20       Chronic diastolic congestive heart failure    Latest ECHO  Results for orders placed during the hospital encounter of 10/22/24    Echo    Interpretation Summary    Left Ventricle: The left ventricle is normal in size. Normal wall thickness. There is normal systolic function with a visually estimated ejection fraction of 60 - 65%. There is normal diastolic function.    Right Ventricle: Normal right ventricular cavity size. Wall thickness is normal. Systolic function is normal.    Left Atrium: Left atrium is severely dilated.    Right Atrium: Right atrium is dilated.    Aortic Valve: The aortic valve is a trileaflet valve. There is moderate aortic valve sclerosis. Mildly restricted motion.    Tricuspid Valve: There is mild regurgitation.    IVC/SVC: Normal venous pressure at 3 mmHg.    Current Heart Failure Medications  hydrALAZINE injection 10 mg, Every 4 hours PRN, Intravenous  labetalol 20 mg/4 mL (5 mg/mL) IV syring, Every 4 hours PRN, Intravenous  hydrALAZINE tablet 50 mg, Every 8 hours, Oral  lisinopriL  tablet 40 mg, Daily, Oral    Plan  - Monitor strict I&Os every 4 hours and daily weights.    - On telemetry  - The patient's volume status is stable        Natasha Rivera PA-C  Neurosurgery  Dario Glover - Neurosurgery ([facility])

## 2024-12-09 NOTE — PT/OT/SLP PROGRESS
Occupational Therapy   Treatment    Name: Ca Coats  MRN: [id number]  Admitting Diagnosis:  Cerebral aneurysm  19 Days Post-Op    Recommendations:     Discharge Recommendations: High Intensity Therapy  Discharge Equipment Recommendations:  lift device, hospital bed, wheelchair  Barriers to discharge:   (increased (A) required)    Assessment:     Ca Coats is a 73 y.o. female with a medical diagnosis of Cerebral aneurysm.  She presents with fair participation and motivation.  Pt required increased encouragement to participate in activities seated EOB. Pt with continued risk for falls. Performance deficits affecting function are weakness, impaired endurance, impaired self care skills, impaired functional mobility, impaired balance, decreased safety awareness, decreased lower extremity function, decreased upper extremity function, impaired cognition.     Rehab Prognosis:  Fair; patient would benefit from acute skilled OT services to address these deficits and reach maximum level of function.       Plan:     Patient to be seen 4 x/week to address the above listed problems via self-care/home management, therapeutic activities, therapeutic exercises, neuromuscular re-education, cognitive retraining  Plan of Care Expires: 01/24/25  Plan of Care Reviewed with: patient    Subjective     Chief Complaint: fatigue  Patient/Family Comments/goals: Pt with difficulty expressing self during session however reported that she was tired & did not want to get up (agreed to EOB after max education/encouragement).  Pain/Comfort:  Pain Rating 1: 0/10  Pain Rating Post-Intervention 1: 0/10    Objective:     Communicated with: RN prior to session.  Patient found supine with telemetry, PureWick (no family present) upon OT entry to room.    General Precautions: Standard, fall, aspiration, seizure    Orthopedic Precautions:N/A  Braces: N/A     Occupational Performance:     Bed Mobility:    Patient completed  Scooting/Bridging with total assistance up HOB while supine; SBA with scooting back while seated EOB  Patient completed Supine to Sit with maximal assistance  Patient completed Sit to Supine with maximal assistance     Functional Mobility/Transfers:  Pt declined performing    Activities of Daily Living:  Pt declined performing ADL's.      [facility] 6 Click ADL: 13    Treatment & Education:  Pt declined therex with BUE while seated EOB.  Pt required SBA with postural control while seated EOB.  Pt able to sequence 1/7 days/week with max cues.  Pt was oriented to person only therefore provided daily orientation. Provided education on reasons for therapy.  Pt had no further questions & when asked whether there were any concerns pt reported none.      Patient left supine with all lines intact, call button in reach, & RN notified.    GOALS:   Multidisciplinary Problems       Occupational Therapy Goals          Problem: Occupational Therapy    Goal Priority Disciplines Outcome Interventions   Occupational Therapy Goal     OT, PT/OT Progressing    Description: Goals to be met by: 12/24/24     Patient will increase functional independence with ADLs by performing:    UE Dressing with Stand-by Assistance.  LE Dressing with Minimal Assistance.  Grooming while seated with Stand-by Assistance.  Toileting from toilet/bedside commode with Minimal Assistance for hygiene and clothing management.   Supine to sit with Minimal Assistance.  Toilet transfer to toilet/bedside commode with Minimal Assistance.  Eating with Modified independence    Patient requires a hospital bed for positioning of the body in ways that are not feasible with an ordinary bed. The patient requires special positioning for pain relief, limited mobility, and/or being unable to independently make changes in body position without the use of a hospital bed. Pillows and wedges will not be adequate for resolving these positional issues.      Patient has a mobility  limitation that significantly impairs their ability to participate in one or more mobility related activities of daily living in customary locations in the home. The mobility limitation cannot be sufficiently resolved by the use of a cane or walker. The use of a manual wheelchair will greatly improve the patient's ability to participate in MRADLs. The patient will use the wheelchair on a regular basis at home. They have expressed their willingness to use a manual wheelchair in the home, and have a caregiver who is available and willing to assist with the wheelchair if needed.                            Time Tracking:     OT Date of Treatment: 12/09/24  OT Start Time: 1121  OT Stop Time: 1136  OT Total Time (min): 15 min    Billable Minutes:Therapeutic Activity 15    OT/RICHARD: OT     Number of RICHARD visits since last OT visit: 1 12/9/2024

## 2024-12-09 NOTE — PLAN OF CARE
Problem: Adult Inpatient Plan of Care  Goal: Plan of Care Review  Outcome: Progressing  Goal: Patient-Specific Goal (Individualized)  Outcome: Progressing  Goal: Optimal Comfort and Wellbeing  Outcome: Progressing  Goal: Readiness for Transition of Care  Outcome: Progressing     Problem: Adult Inpatient Plan of Care  Goal: Plan of Care Review  Outcome: Progressing     Problem: Adult Inpatient Plan of Care  Goal: Patient-Specific Goal (Individualized)  Outcome: Progressing     Problem: Adult Inpatient Plan of Care  Goal: Optimal Comfort and Wellbeing  Outcome: Progressing     Problem: Adult Inpatient Plan of Care  Goal: Readiness for Transition of Care  Outcome: Progressing     Problem: Wound  Goal: Optimal Coping  Outcome: Progressing  Goal: Optimal Functional Ability  Outcome: Progressing     Problem: Wound  Goal: Optimal Coping  Outcome: Progressing     Problem: Wound  Goal: Optimal Functional Ability  Outcome: Progressing       POC reviewed with patient at the beside. Verbalization of understanding voiced. Questions and concerns addressed. Precautions maintained. No events noted at present during this shift. Cardiac monitoring ongoing. Vital signs all shift.  Upon exiting room bed low and locked with call light within reach and bed alarm activated. POC ongoing.

## 2024-12-09 NOTE — HPI
Per chart review, Isabel Coats is a 73-year-old female past medical history of hypertension, obstructive sleep apnea on CPAP, suggestive heart failure, obesity, history of gastric cancer status post chemoradiation and DVT on Eliquis. Pt is S/P Left Crani with clips for cerebral aneerysm on 11/20/2024. Pt's hospital course was complicated by unexplained decline in exam during which stat CT showed IPH in the right side of her cerebellum. She became less responsive as well as developed a L gaze preference and dense aphasia. She had a perfusion scan with questionable perfusion deficit to the left anterior temporal lobe and milrinone was started with seemingly some clinical improvement as per NSGY notes.  PM &R was consulted to evaluate pt for post acute placement.     Functional History: Patient lives alone  in a first floor apt with one step  to enter.  Prior to admission, Pt was Mod I with RW use.  DME: [facility].

## 2024-12-09 NOTE — ASSESSMENT & PLAN NOTE
-S/P Left crani on 11/20 for cerebral aneurysm on 11/20/2024.  -PT/OT/SLP.  -Will need disposition plan. No family present at time of visit today.   -CTA 11/21 with [facility]. S/P Abigail. -Continue Keppra. EEG was neg.

## 2024-12-09 NOTE — CONSULTS
Dario Glover - Neurosurgery ([facility])  Physical Medicine & Rehab  Consult Note    Patient Name: Ca Coats  MRN: [id number]  Admission Date: 11/20/2024  Hospital Length of Stay: 19 days  Attending Physician: Alisa Friedman MD   Consults  Subjective:     Principal Problem: Cerebral aneurysm    HPI: Per chart review, Isabel Coats is a 73-year-old female past medical history of hypertension, obstructive sleep apnea on CPAP, suggestive heart failure, obesity, history of gastric cancer status post chemoradiation and DVT on Eliquis. Pt is S/P Left Crani with clips for cerebral aneerysm on 11/20/2024. Pt's hospital course was complicated by unexplained decline in exam during which stat CT showed IPH in the right side of her cerebellum. She became less responsive as well as developed a L gaze preference and dense aphasia. She had a perfusion scan with questionable perfusion deficit to the left anterior temporal lobe and milrinone was started with seemingly some clinical improvement as per NSGY notes.  PM &R was consulted to evaluate pt for post acute placement.     Functional History: Patient lives alone  in a first floor apt with one step  to enter.  Prior to admission, Pt was Mod I with RW use.  DME: RW.      Hospital Course: Per chart review,    PT-12/08    Bed Mobility:   Rolling: to L/R with moderate assistance  Supine > Sit: moderate assistance  Sit > Supine: moderate assistance     Transfers:   Sit <> Stand Transfer: moderate assistance and of 2 persons from EOB without AD; therapist on either side, pt performs sit <> stand x 2, performs side steps first attempt, able to maintain stand for 30s second attempt     Balance:   Sitting balance: FAIR+: Maintains balance through MINIMAL excursions of active trunk motion                 Gait:  Distance: 2 side steps   Assistive Device: Therapists on either side  Assistance Level: moderate assistance and of 2 persons  Gait Assessment: 2 side  steps.  OT-12/06      Bed Mobility:    Patient completed Rolling/Turning to Left with  minimum assistance  Patient completed Rolling/Turning to Right with minimum assistance  Patient completed Scooting/Bridging with maximal assistance  Patient completed Supine to Sit with moderate assistance and 2 persons  Patient completed Sit to Supine with minimum assistance   Patient sat EOB with standby assistance; While sitting EOB patient completed the following:   Postural cueing for upright sitting and midline orientation  Multi-directional reaching; emphasis on postural maintenance and upright tolerance         Past Medical History:   Diagnosis Date    YOUNG (acute kidney injury) 10/15/2021    Anemia     2018    Arthritis     BMI 60.0-69.9, adult     Cataract     Chronic anticoagulation 11/27/2024    DVT in currently on Eliquis     Chronic diastolic congestive heart failure 01/27/2021    Coronary artery disease involving native coronary artery of native heart without angina pectoris 11/15/2024    Deep vein thrombosis     Depression     Dry eye syndrome     DVT of deep femoral vein, right 05/29/2023    Gastric adenocarcinoma 05/25/2021    GERD (gastroesophageal reflux disease)     Glaucoma suspect     History of gastric ulcer     History of psychiatric hospitalization     Hx of psychiatric care     Celexa, no recall of charted Effexor, Lexapro, Desyrel prescriptions    Hyperlipidemia     Hypertension     Hypothyroidism     Morbid obesity     Neuromuscular disorder     Sleep apnea     Thyroid disease      Past Surgical History:   Procedure Laterality Date    APPENDECTOMY      CATARACT EXTRACTION W/  INTRAOCULAR LENS IMPLANT Bilateral     MFIOL OU    CLIP LIGATION OF INTRACRANIAL ANEURYSM BY CRANIOTOMY Left 11/20/2024    Procedure: CRANIOTOMY, WITH ANEURYSM CLIPPING W/ STEALTH;  Surgeon: Sejal Simon MD;  Location: [facility] OR [address] [place] [address];  Service: Neurosurgery;  Laterality: Left;  left pterioral craniotomy with microsurgical  clip ligation of mca and anterior chroidal aneurysm, dr. ginna hopkins co surgeon, scalp block, type and cross 2 units, neuromonitoring sep,mep,eeg, regular bed, desouza, supine spencer, icu pos    CORONARY ANGIOGRAPHY Bilateral 02/24/2021    Procedure: ANGIOGRAM, CORONARY ARTERY;  Surgeon: Cresencio Ridley MD;  Location: [facility] CATH LAB;  Service: Cardiology;  Laterality: Bilateral;  Covid test needed - ok per Danay SSCU. Pt. w/o transportation or family    CRANIOTOMY, FOR ANEURYSM OF VERTEBROBASILAR OR CAROTID CIRCULATION Left 11/20/2024    Procedure: CRANIOTOMY, FOR ANEURYSM OF VERTEBROBASILAR OR CAROTID CIRCULATION;  Surgeon: Sejal Simon MD;  Location: [facility] OR [address] [place] [address];  Service: Neurosurgery;  Laterality: Left;  with scalp block    DISSECTION, NERVE, USING OPERATING MICROSCOPE Left 11/20/2024    Procedure: DISSECTION, NERVE, USING OPERATING MICROSCOPE;  Surgeon: Sejal Simon MD;  [facility];  Service: Neurosurgery;  Laterality: Left;  with scalp block    ENDOSCOPIC ULTRASOUND OF UPPER GASTROINTESTINAL TRACT N/A 03/17/2021    Procedure: ULTRASOUND, UPPER GI TRACT, ENDOSCOPIC;  Surgeon: Gerald Anaya MD;  [facility]  Service: Endoscopy;  Laterality: N/A;  Pt unable to get a ride for swab. Will do rapid test at 8:30 - ttr    ENDOSCOPIC ULTRASOUND OF UPPER GASTROINTESTINAL TRACT N/A 01/13/2022    Procedure: ULTRASOUND, UPPER GI TRACT, ENDOSCOPIC;  Surgeon: Kevin Carballo MD;  [facility]O [facility]  Service: Endoscopy;  Laterality: N/A;  blood thinner approval received, see telephone encounter 1/7/22-BB  rapid  instructions given verbally and sent to address on file in pt's chart-BB    ENDOSCOPIC ULTRASOUND OF UPPER GASTROINTESTINAL TRACT N/A 10/11/2022    Procedure: ULTRASOUND, UPPER GI TRACT, ENDOSCOPIC;  Surgeon: Dequan Ridley MD;  Location: [facility] ENDO (2ND FLR);  Service: Endoscopy;  Laterality: N/A;    ENDOSCOPIC ULTRASOUND OF UPPER  GASTROINTESTINAL TRACT N/A 01/24/2024    Procedure: ULTRASOUND, UPPER GI TRACT, ENDOSCOPIC;  Surgeon: Kevin Carballo MD;  Location: [facility] SAM (2ND FLR);  Service: Endoscopy;  Laterality: N/A;    ENDOSCOPIC ULTRASOUND OF UPPER GASTROINTESTINAL TRACT N/A 03/05/2024    Procedure: ULTRASOUND, UPPER GI TRACT, ENDOSCOPIC;  Surgeon: Kevin Carballo MD;  Location: [facility] SAM (2ND FLR);  Service: Endoscopy;  Laterality: N/A;  1/25 portal instr-Repeat theEUS at the next available appointment because the preparation was poor. 48hrs of liquid. Ozempic 7 day hold-eliquis approved Dr. Pelaez TE 12/12/2023-tt  2/28-precall complete-pt verbalized udnerstanding of holding Oz    ESOPHAGOGASTRODUODENOSCOPY N/A 02/05/2021    Procedure: EGD (ESOPHAGOGASTRODUODENOSCOPY);  Surgeon: Matthew Hernadez MD;  Location: [facility] (2ND FLR);  Service: Endoscopy;  Laterality: N/A;  BMI-58    Wt: 361#     COVID test at [facility] on 2/2-GT    ESOPHAGOGASTRODUODENOSCOPY N/A 03/17/2021    Procedure: EGD (ESOPHAGOGASTRODUODENOSCOPY);  Surgeon: Gerald Anaya MD;  Location: [facility] (2ND FLR);  Service: Endoscopy;  Laterality: N/A;    ESOPHAGOGASTRODUODENOSCOPY N/A 05/25/2021    Procedure: EGD (ESOPHAGOGASTRODUODENOSCOPY);  Surgeon: Kevin Carballo MD;  Location: [facility] (2ND FLR);  Service: Endoscopy;  Laterality: N/A;  ESD 2 hours  Full COVID vaccination on file. ttr    ESOPHAGOGASTRODUODENOSCOPY N/A 01/13/2022    Procedure: EGD (ESOPHAGOGASTRODUODENOSCOPY);  Surgeon: Kevin Carballo MD;  Location: [facility] ENDO (2ND FLR);  Service: Endoscopy;  Laterality: N/A;  blood thinner approval, see telephone encounter 1/7/22-BB  rapid  instructions given verbally and sent to address on file in pt's chart-BB    ESOPHAGOGASTRODUODENOSCOPY N/A 10/11/2022    Procedure: EGD (ESOPHAGOGASTRODUODENOSCOPY);  Surgeon: Dequan Ridley MD;  Location: [facility] ([address] [place] [address]);  Service: Endoscopy;  Laterality: N/A;  She is do for EGD/EUS now. Personal history of gastric  cancer. Ca Mendozaon #[id number].   Thanks,   Kevin Carballo. MD    Pt is fully vaccinated-DS  9/14/22-Approval to hold Eliquis rec'd from Dr. Pelaez (see telephone encounter 9/14/22)-DS  9/14/22-Ins    ESOPHAGOGASTRODUODENOSCOPY N/A 01/24/2024    Procedure: EGD (ESOPHAGOGASTRODUODENOSCOPY);  Surgeon: Kevin Carballo MD;  Location: [facility] ENDO (2ND FLR);  Service: Endoscopy;  Laterality: N/A;  12/8/23: instructions sent via portal. eliquis approval from MD Pelaez in telephone encounter (12/12/23) -GD  1/9-precall complete-MS    ESOPHAGOGASTRODUODENOSCOPY N/A 03/05/2024    Procedure: EGD (ESOPHAGOGASTRODUODENOSCOPY);  Surgeon: Kevin Carballo MD;  Location: [facility] ENDO (2ND FLR);  Service: Endoscopy;  Laterality: N/A;    EYE SURGERY      HYSTEROSCOPIC POLYPECTOMY OF UTERUS  01/10/2024    Procedure: POLYPECTOMY, UTERUS, HYSTEROSCOPIC;  Surgeon: Pina Pickens MD;  Location: [facility] OR;  Service: OB/GYN;;    HYSTEROSCOPY WITH DILATION AND CURETTAGE OF UTERUS N/A 01/10/2024    Procedure: HYSTEROSCOPY, WITH DILATION AND CURETTAGE OF UTERUS;  Surgeon: Pina Pickens MD;  Location: [facility] OR;  Service: OB/GYN;  Laterality: N/A;    INJECTION OF ANESTHETIC AGENT AROUND NERVE Left 07/10/2018    Procedure: BLOCK, NERVE;  Surgeon: Samantha Vidal MD;  Location: [facility] PAIN MGT;  Service: Pain Management;  Laterality: Left;  Genicular     INJECTION OF ANESTHETIC AGENT AROUND NERVE Left 07/19/2019    Procedure: Block, Nerve NERVE BLOCK GENICULAR WITH PHENOL 6%;  Surgeon: Samantha Vidal MD;  Location: [facility] PAIN MGT;  Service: Pain Management;  Laterality: Left;  NEEDS CONSENT    INSERTION OF TUNNELED CENTRAL VENOUS CATHETER (CVC) WITH SUBCUTANEOUS PORT N/A 07/09/2021    Procedure: INSERTION, PORT-A-CATH;  Surgeon: Mario Paz MD;  Location: [facility] CATH LAB;  Service: Radiology;  Laterality: N/A;  PORT PLACEMENT    RADIOFREQUENCY ABLATION Left 06/19/2020    Procedure: RADIOFREQUENCY ABLATION LEFT GENICULAR;  Surgeon:  Tevin Clark MD;  Location: [facility] PAIN MGT;  Service: Pain Management;  Laterality: Left;  Left Genicular RFA    RADIOFREQUENCY ABLATION Left 01/22/2021    Procedure: RADIOFREQUENCY ABLATION LEFT GENICULAR;  Surgeon: Tevin Clark MD;  Location: [facility] PAIN MGT;  Service: Pain Management;  Laterality: Left;    RADIOFREQUENCY ABLATION Left 07/30/2021    Procedure: RADIOFREQUENCY ABLATION, GENICULAR COOLED NEED CONSENT;  Surgeon: Tevin Clark MD;  Location: [facility] PAIN MGT;  Service: Pain Management;  Laterality: Left;    RADIOFREQUENCY ABLATION Left 04/22/2022    Procedure: RADIOFREQUENCY ABLATION, GENICULAR COOLED , LEFT;  Surgeon: Tevin Clark MD;  Location: [facility] PAIN MGT;  Service: Pain Management;  Laterality: Left;    RADIOFREQUENCY ABLATION Left 12/23/2022    Procedure: RADIOFREQUENCY ABLATION LEFT GENICULAR COOLED CLEARED TO HOLD ELIQUIS 3 DAYS  NEEDS CONSENT;  Surgeon: Teivn Clark MD;  Location: [facility] PAIN MGT;  Service: Pain Management;  Laterality: Left;    TONSILLECTOMY       Review of patient's allergies indicates:  No Known Allergies    Scheduled Medications:    atorvastatin  20 mg Oral Daily    citalopram  10 mg Oral Daily    duke's soln (benadryl 30 mL, mylanta 30 mL, LIDOcaine 30 mL, nystatin 30 mL) 120 mL  10 mL Oral QID    gabapentin  600 mg Oral Q8H    heparin (porcine)  7,500 Units Subcutaneous Q8H    hydrALAZINE  50 mg Oral Q8H    levetiracetam  1,000 mg Oral BID    lisinopriL  40 mg Oral Daily       PRN Medications:   Current Facility-Administered Medications:     acetaminophen, 650 mg, Oral, Q4H PRN    bisacodyL, 10 mg, Rectal, Daily PRN    hydrALAZINE, 10 mg, Intravenous, Q4H PRN    labetalol, 10 mg, Intravenous, Q4H PRN    morphine, 2 mg, Intravenous, Q6H PRN    naloxone, 0.4 mg, Intravenous, PRN    ondansetron, 4 mg, Intravenous, Q12H PRN    oxyCODONE, 5 mg, Oral, Q6H PRN    sodium chloride 0.9%, 10 mL, Intravenous, PRN    Family History       Problem Relation (Age of Onset)     No Known Problems Mother, Father          Tobacco Use    Smoking status: Some Days     Current packs/day: 0.00     Average packs/day: 1 pack/day for 53.9 years (53.7 ttl pk-yrs)     Types: Cigarettes     Start date:      Last attempt to quit: 2023     Years since quittin.9    Smokeless tobacco: Never    Tobacco comments:     currently smoking <5 cigarettes most days   Substance and Sexual Activity    Alcohol use: Yes     Alcohol/week: 1.0 standard drink of alcohol     Types: 1 Glasses of wine per week     Comment: rarely    Drug use: No    Sexual activity: Not Currently     Partners: Male     Review of Systems   Unable to perform ROS: Other     Objective:     Vital Signs (Most Recent):  Temp: 98.5 °F (36.9 °C) (24 1136)  Pulse: 67 (24 1136)  Resp: 18 (24 1136)  BP: (!) 115/55 (24 1136)  SpO2: 96 % (24 1136)    Vital Signs (24h Range):  Temp:  [97.9 °F (36.6 °C)-98.5 °F (36.9 °C)] 98.5 °F (36.9 °C)  Pulse:  [57-85] 67  Resp:  [18] 18  SpO2:  [93 %-96 %] 96 %  BP: (106-124)/(55-61) 115/55     Body mass index is 52.62 kg/m².     Physical Exam  Vitals and nursing note reviewed.   Constitutional:       Appearance: Normal appearance.   Pulmonary:      Effort: Pulmonary effort is normal. No respiratory distress.   Abdominal:      General: There is no distension.   Musculoskeletal:      Cervical back: Normal range of motion and neck supple.      Comments: Left sided weakness.    Neurological:      General: No focal deficit present.      Mental Status: She is alert.      GCS: GCS eye subscore is 4. GCS verbal subscore is 4. GCS motor subscore is 5.      Motor: Weakness present.      Coordination: Coordination abnormal. Heel to Shin Test abnormal. Impaired rapid alternating movements.      Gait: Gait abnormal.   Psychiatric:         Speech: Speech is delayed.         Behavior: Behavior is slowed. Behavior is cooperative.               Diagnostic Results: Labs:  Reviewed  Assessment/Plan:     * Cerebral aneurysm  -S/P Left crani on 11/20 for cerebral aneurysm on 11/20/2024.  -PT/OT/SLP.  -Will need disposition plan. No family present at time of visit today.   -CTA 11/21 with [facility]. S/P Milirone. -Continue Keppra. EEG was neg.      Oral thrush  -Continue Velazquez's Solution.     Debility  See hospital course for functional status.    Recommendations  -  Encourage mobility, OOB in chair, and early ambulation as appropriate  -  PT/OT evaluate and treat  -  Pain management  -  DVT prophylaxis if appropriate   -  Monitor for and prevent skin breakdown and pressure ulcers  Early mobility, repositioning/weight shifting every 20-30 minutes when sitting, turn patient every 2 hours, proper mattress/overlay and chair cushioning, pressure relief/heel protector boots     Gait instability  -Will need agressive PT/OT.     Essential hypertension  -Keep SBP <160.  -Continue Lisinopril and Hydralazine.     PM&R Recommendation:     At this time, the PM&R team has reviewed this patient's ongoing medical case including inpatient diagnosis, medical history, clinical examination, labs, vitals, current social and functional history to provide the post-acute recommendation as follows:     RECOMMENDATIONS: inpatient rehabilitation due to fair to good potential for improvement with therapies and need of physician oversight for management of ongoing active medical issues, once medically stable.     The patient will be admitted for comprehensive interdisciplinary inpatient rehabilitation to address the impairments due to her  medical diagnosis of  Cerebral aneurysm, S/P Craniotomy. The patient will benefit from an inpatient rehabilitation program to promote functional recovery, implement compensatory strategies and will undergo assessment for needs for durable medical equipment for safe discharge to the community. This patient will benefit from a coordinated interdisciplinary rehabilitation program services  that require close monitoring and treatment with 24-hour rehabilitative nursing and physical/occupational/speech therapies for 3 hours/day for 5 days/week. This interdisciplinary program will be performed under the direction of a physiatrist.         We will continue to follow.       Thank you for your consult.     Marti Madrigal NP  Department of Physical Medicine & Rehab  [facility])

## 2024-12-09 NOTE — HOSPITAL COURSE
Per chart review,    PT-12/08    Bed Mobility:   Rolling: to L/R with moderate assistance  Supine > Sit: moderate assistance  Sit > Supine: moderate assistance     Transfers:   Sit <> Stand Transfer: moderate assistance and of 2 persons from EOB without AD; therapist on either side, pt performs sit <> stand x 2, performs side steps first attempt, able to maintain stand for 30s second attempt     Balance:   Sitting balance: FAIR+: Maintains balance through MINIMAL excursions of active trunk motion                 Gait:  Distance: 2 side steps   Assistive Device: Therapists on either side  Assistance Level: moderate assistance and of 2 persons  Gait Assessment: 2 side steps.  OT-12/06      Bed Mobility:    Patient completed Rolling/Turning to Left with  minimum assistance  Patient completed Rolling/Turning to Right with minimum assistance  Patient completed Scooting/Bridging with maximal assistance  Patient completed Supine to Sit with moderate assistance and 2 persons  Patient completed Sit to Supine with minimum assistance   Patient sat EOB with standby assistance; While sitting EOB patient completed the following:   Postural cueing for upright sitting and midline orientation  Multi-directional reaching; emphasis on postural maintenance and upright tolerance

## 2024-12-09 NOTE — ASSESSMENT & PLAN NOTE
Isabel Coats is a 73-year-old female past medical history of hypertension, obstructive sleep apnea on CPAP, suggestive heart failure, obesity, history of gastric cancer status post chemoradiation and DVT in currently on Eliquis. She presents to [facility] s/p L pterional crani on 11/20 with 7 clips applied in proximity to large irregular L MCA bifurcation aneurysm and other adjacent small aneurysms, 1 clip applied across neck of aneurysm just proximal to anterior choroidal.  After surgery she seemed to be gradually waking up appropriately overnight, then AM of POD1 around shift change had an unexplained decline in exam during which stat CT showed IPH in the right side of her cerebellum. She became less responsive as well as developed a L gaze preference and dense aphasia. She had a perfusion scan with questionable perfusion deficit to the left anterior temporal lobe and milrinone was started with seemingly some clinical improvement. Now gaze preference resolved, following commands x4 antigravity, able to state name but still remains aphasic.    Imaging:  CTA Head 11/20: expected post-op changes  CTH 11/21 8 AM: right sided cerebellar IPH  CTA 11/21 10 AM: stable IPH, possible frontal contusion  CTP 11/21 4 PM: possible left anterior temporal perfusion deficit in territory of branches distal to MCA clips   MRI brain w wo 11/22: no strokes, post op changes, stable cerebellar bleed. some edema in right frontal lobe.    Plans:  -Admitted to NSGY floor, q4h nc/vs  -SBP <160  -drain removed 11/25  -keppra 1g BID postop. cEEG negative during initial AMS POD1  -Cerebral vasospasm: weaned off milrinone prior to step down  - Daily TCDs completed  - Keep euvolemic. Strict I/O's  -PT/OT/OOB  -SLP - tolerating current diet but with poor intake. Continue SLP. Encourage PO fluids since NGT removed 11/28. Diet advanced to regular diet.   -Continue nutritional support with boost supplementation  -Continue to monitor clinically, notify  NSGY immediately with any changes in neuro status   -SQH for DVT ppx    Dispo: pending dispo    Case and plan discussed with attending neurosurgeon Dr. Friedman

## 2024-12-09 NOTE — PT/OT/SLP PROGRESS
Speech Language Pathology Treatment    Patient Name:  Ca Coats   MRN:  [id number]  Admitting Diagnosis: Cerebral aneurysm    Recommendations:                 General Recommendations:  Dysphagia therapy, Speech/language therapy, and Cognitive-linguistic therapy  Diet recommendations:  Regular Diet - IDDSI Level 7, Liquid Diet Level: Thin liquids - IDDSI Level 0   Aspiration Precautions: Assistance with meals, HOB to 90 degrees, Meds whole 1 at a time, and Strict aspiration precautions   General Precautions: Standard, aspiration, fall  Communication strategies:  provide increased time to answer and go to room if call light pushed    Assessment:     Ca Coats is a 73 y.o. female with an SLP diagnosis of Aphasia, Dysphagia, and Cognitive-Linguistic Impairment.  SLP will continue to follow.     Subjective     Pt awake and alert upon arrival. Pt agreeable to participate in session.     Pain/Comfort:  Pain Rating 1: 0/10  Pain Rating Post-Intervention 1: 0/10    Respiratory Status: Room air    Objective:     Has the patient been evaluated by SLP for swallowing?   Yes  Keep patient NPO? No   Current Respiratory Status:        Pt seen for ongoing speech therapy. Pt awake and alert upon arrival. Pt continues to present with difficulties expressing wants/needs. Pt with evident word finding issues, with pt attempting to produce phrases but unable to fully complete phrase to communicate fully. Pt answered y/n questions with 65% accuracy. Pt completed automatic speech task (counting) independently once provided with initial cue to initiate task. Pt identifying objects in Fo2 with 100% accuracy but was unable to complete confrontational naming task. Pt following simple commands with 30% accuracy, increasing to 60% accuracy with models. Perseveration evident. Pt politely refusing all po trials offered. SLP provided education regarding role of SLP, aspiration precautions, speech strategies,  recommendations, and SLP POC. Pt nodded in understanding but would benefit from reinforcement. SLP will continue to follow.     Goals:   Multidisciplinary Problems       SLP Goals          Problem: SLP    Goal Priority Disciplines Outcome   SLP Goal     SLP Progressing   Description: Speech Language Pathology Goals  Goals expected to be met by 12/5:  1) Pt will participate in ongoing assessment of swallow function to determine least restrictive diet.  2) Pt will participate in ongoing speech, language, cognitive evaluation to determine possible goals and poc.   3) Pt will respond to simple yes/no questions with 80% accy given min cues.  4) Pt will complete automatic speech tasks with 90% accy given min cues.  5) Pt will complete naming tasks with 80% accy given mod cues.  6) Pt will follow simple commands with 90% accy given mod cues.                                 Plan:     Patient to be seen:  4 x/week   Plan of Care expires:  12/22/24  Plan of Care reviewed with:  patient   SLP Follow-Up:  Yes       Discharge recommendations:  High Intensity Therapy   Barriers to Discharge:  Level of Skilled Assistance Needed      Time Tracking:     SLP Treatment Date:   12/09/24  Speech Start Time:  1053  Speech Stop Time:  1109     Speech Total Time (min):  16 min    Billable Minutes: Speech Therapy Individual 8 and Self Care/Home Management Training 8    12/09/2024

## 2024-12-09 NOTE — SUBJECTIVE & OBJECTIVE
Past Medical History:   Diagnosis Date    YOUNG (acute kidney injury) 10/15/2021    Anemia     2018    Arthritis     BMI 60.0-69.9, adult     Cataract     Chronic anticoagulation 11/27/2024    DVT in currently on Eliquis     Chronic diastolic congestive heart failure 01/27/2021    Coronary artery disease involving native coronary artery of native heart without angina pectoris 11/15/2024    Deep vein thrombosis     Depression     Dry eye syndrome     DVT of deep femoral vein, right 05/29/2023    Gastric adenocarcinoma 05/25/2021    GERD (gastroesophageal reflux disease)     Glaucoma suspect     History of gastric ulcer     History of psychiatric hospitalization     Hx of psychiatric care     Celexa, no recall of charted Effexor, Lexapro, Desyrel prescriptions    Hyperlipidemia     Hypertension     Hypothyroidism     Morbid obesity     Neuromuscular disorder     Sleep apnea     Thyroid disease      Past Surgical History:   Procedure Laterality Date    APPENDECTOMY      CATARACT EXTRACTION W/  INTRAOCULAR LENS IMPLANT Bilateral     MFIOL OU    CLIP LIGATION OF INTRACRANIAL ANEURYSM BY CRANIOTOMY Left 11/20/2024    Procedure: CRANIOTOMY, WITH ANEURYSM CLIPPING W/ STEALTH;  Surgeon: Sejal Simon MD;  Location: [facility] OR [address] [place] [address];  Service: Neurosurgery;  Laterality: Left;  left pterioral craniotomy with microsurgical clip ligation of mca and anterior chroidal aneurysm, dr. ginna hopkins co surgeon, scalp block, type and cross 2 units, neuromonitoring sep,mep,eeg, regular bed, [place], supine spencer, icu pos    CORONARY ANGIOGRAPHY Bilateral 02/24/2021    Procedure: ANGIOGRAM, CORONARY ARTERY;  Surgeon: Cresencio Ridley MD;  Location: [facility] CATH LAB;  Service: Cardiology;  Laterality: Bilateral;  Covid test needed - ok per Danay SSCU. Pt. w/o transportation or family    CRANIOTOMY, FOR ANEURYSM OF VERTEBROBASILAR OR CAROTID CIRCULATION Left 11/20/2024    Procedure: CRANIOTOMY, FOR ANEURYSM OF VERTEBROBASILAR OR  CAROTID CIRCULATION;  Surgeon: Sejal Simon MD;  Location: [facility] OR 2ND FLR;  Service: Neurosurgery;  Laterality: Left;  with scalp block    DISSECTION, NERVE, USING OPERATING MICROSCOPE Left 11/20/2024    Procedure: DISSECTION, NERVE, USING OPERATING MICROSCOPE;  Surgeon: Sejal Simon MD;  Location: [facility] OR 2ND FLR;  Service: Neurosurgery;  Laterality: Left;  with scalp block    ENDOSCOPIC ULTRASOUND OF UPPER GASTROINTESTINAL TRACT N/A 03/17/2021    Procedure: ULTRASOUND, UPPER GI TRACT, ENDOSCOPIC;  Surgeon: Gerald Anaya MD;  Location: [facility] ENDO (2ND FLR);  Service: Endoscopy;  Laterality: N/A;  Pt unable to get a ride for swab. Will do rapid test at 8:30 - ttr    ENDOSCOPIC ULTRASOUND OF UPPER GASTROINTESTINAL TRACT N/A 01/13/2022    Procedure: ULTRASOUND, UPPER GI TRACT, ENDOSCOPIC;  Surgeon: Kevin Carballo MD;  Location: [facility] ENDO (2ND FLR);  Service: Endoscopy;  Laterality: N/A;  blood thinner approval received, see telephone encounter 1/7/22-BB  rapid  instructions given verbally and sent to address on file in pt's chart-BB    ENDOSCOPIC ULTRASOUND OF UPPER GASTROINTESTINAL TRACT N/A 10/11/2022    Procedure: ULTRASOUND, UPPER GI TRACT, ENDOSCOPIC;  Surgeon: Dequan Ridley MD;  Location: [facility] ENDO (2ND FLR);  Service: Endoscopy;  Laterality: N/A;    ENDOSCOPIC ULTRASOUND OF UPPER GASTROINTESTINAL TRACT N/A 01/24/2024    Procedure: ULTRASOUND, UPPER GI TRACT, ENDOSCOPIC;  Surgeon: Kevin Carballo MD;  Location: [facility] ENDO (2ND FLR);  Service: Endoscopy;  Laterality: N/A;    ENDOSCOPIC ULTRASOUND OF UPPER GASTROINTESTINAL TRACT N/A 03/05/2024    Procedure: ULTRASOUND, UPPER GI TRACT, ENDOSCOPIC;  Surgeon: Kevin Carballo MD;  Location: [facility] ENDO (2ND FLR);  Service: Endoscopy;  Laterality: N/A;  1/25 portal instr-Repeat theEUS at the next available appointment because the preparation was poor. 48hrs of liquid. Ozempic 7 day hold-eliquis approved Dr. Pelaez TE  12/12/2023-tt  2/28-precall complete-pt verbalized udnerstanding of holding Oz    ESOPHAGOGASTRODUODENOSCOPY N/A 02/05/2021    Procedure: EGD (ESOPHAGOGASTRODUODENOSCOPY);  Surgeon: Matthew Hernadez MD;  Location: [facility] ENDO (2ND FLR);  Service: Endoscopy;  Laterality: N/A;  BMI-58    Wt: 361#     COVID test at W on 2/2-GT    ESOPHAGOGASTRODUODENOSCOPY N/A 03/17/2021    Procedure: EGD (ESOPHAGOGASTRODUODENOSCOPY);  Surgeon: Gerald Anaya MD;  Location: [facility] ENDO (2ND FLR);  Service: Endoscopy;  Laterality: N/A;    ESOPHAGOGASTRODUODENOSCOPY N/A 05/25/2021    Procedure: EGD (ESOPHAGOGASTRODUODENOSCOPY);  Surgeon: Kevin Carballo MD;  Location: [facility] ENDO (2ND FLR);  Service: Endoscopy;  Laterality: N/A;  ESD 2 hours  Full COVID vaccination on file. ttr    ESOPHAGOGASTRODUODENOSCOPY N/A 01/13/2022    Procedure: EGD (ESOPHAGOGASTRODUODENOSCOPY);  Surgeon: Kevin Carballo MD;  Location: [facility] ENDO (2ND FLR);  Service: Endoscopy;  Laterality: N/A;  blood thinner approval, see telephone encounter 1/7/22-BB  rapid  instructions given verbally and sent to address on file in pt's chart-BB    ESOPHAGOGASTRODUODENOSCOPY N/A 10/11/2022    Procedure: EGD (ESOPHAGOGASTRODUODENOSCOPY);  Surgeon: Dequan Ridley MD;  Location: [facility] ENDO (2ND FLR);  Service: Endoscopy;  Laterality: N/A;  She is do for EGD/EUS now. Personal history of gastric cancer. Ca Coats #[id number].   Thanks,   Kevin Fuentes MD    Pt is fully vaccinated-DS  9/14/22-Approval to hold Eliquis rec'd from Dr. Pelaez (see telephone encounter 9/14/22)-DS  9/14/22-Ins    ESOPHAGOGASTRODUODENOSCOPY N/A 01/24/2024    Procedure: EGD (ESOPHAGOGASTRODUODENOSCOPY);  Surgeon: Kevin Carballo MD;  Location: [facility] ([address] [place] [address]);  Service: Endoscopy;  Laterality: N/A;  12/8/23: instructions sent via portal. eliquis approval from MD Pelaez in telephone encounter (12/12/23) -GD  1/9-precall complete-MS    ESOPHAGOGASTRODUODENOSCOPY N/A 03/05/2024    Procedure: EGD  (ESOPHAGOGASTRODUODENOSCOPY);  Surgeon: Kevin Carballo MD;  [facility]  Service: Endoscopy;  Laterality: N/A;    EYE SURGERY      HYSTEROSCOPIC POLYPECTOMY OF UTERUS  01/10/2024    Procedure: POLYPECTOMY, UTERUS, HYSTEROSCOPIC;  Surgeon: Pina Pickens MD;  Location: [facility] OR;  Service: OB/GYN;;    HYSTEROSCOPY WITH DILATION AND CURETTAGE OF UTERUS N/A 01/10/2024    Procedure: HYSTEROSCOPY, WITH DILATION AND CURETTAGE OF UTERUS;  Surgeon: Pina Pickens MD;  Location: [facility] OR;  Service: OB/GYN;  Laterality: N/A;    INJECTION OF ANESTHETIC AGENT AROUND NERVE Left 07/10/2018    Procedure: BLOCK, NERVE;  Surgeon: Samantha Vidal MD;  Location: [facility] PAIN MGT;  Service: Pain Management;  Laterality: Left;  Genicular     INJECTION OF ANESTHETIC AGENT AROUND NERVE Left 07/19/2019    Procedure: Block, Nerve NERVE BLOCK GENICULAR WITH PHENOL 6%;  Surgeon: Samantha Vidal MD;  Location: [facility] PAIN MGT;  Service: Pain Management;  Laterality: Left;  NEEDS CONSENT    INSERTION OF TUNNELED CENTRAL VENOUS CATHETER (CVC) WITH SUBCUTANEOUS PORT N/A 07/09/2021    Procedure: INSERTION, PORT-A-CATH;  Surgeon: Mario Paz MD;  Location: [facility] CATH LAB;  Service: Radiology;  Laterality: N/A;  PORT PLACEMENT    RADIOFREQUENCY ABLATION Left 06/19/2020    Procedure: RADIOFREQUENCY ABLATION LEFT GENICULAR;  Surgeon: Tevin Clark MD;  Location: [facility] PAIN MGT;  Service: Pain Management;  Laterality: Left;  Left Genicular RFA    RADIOFREQUENCY ABLATION Left 01/22/2021    Procedure: RADIOFREQUENCY ABLATION LEFT GENICULAR;  Surgeon: Tevin Clark MD;  Location: [facility] PAIN MGT;  Service: Pain Management;  Laterality: Left;    RADIOFREQUENCY ABLATION Left 07/30/2021    Procedure: RADIOFREQUENCY ABLATION, GENICULAR COOLED NEED CONSENT;  Surgeon: Tevin Clark MD;  Location: [facility] PAIN MGT;  Service: Pain Management;  Laterality: Left;    RADIOFREQUENCY ABLATION Left 04/22/2022    Procedure:  RADIOFREQUENCY ABLATION, GENICULAR COOLED , LEFT;  Surgeon: Tevin Clark MD;  Location: [facility] [place] PAIN MGT;  Service: Pain Management;  Laterality: Left;    RADIOFREQUENCY ABLATION Left 2022    Procedure: RADIOFREQUENCY ABLATION LEFT GENICULAR COOLED CLEARED TO HOLD ELIQUIS 3 DAYS  NEEDS CONSENT;  Surgeon: Tevin Clark MD;  Location: [facility] [place] PAIN MGT;  Service: Pain Management;  Laterality: Left;    TONSILLECTOMY       Review of patient's allergies indicates:  No Known Allergies    Scheduled Medications:    atorvastatin  20 mg Oral Daily    citalopram  10 mg Oral Daily    duke's soln (benadryl 30 mL, mylanta 30 mL, LIDOcaine 30 mL, nystatin 30 mL) 120 mL  10 mL Oral QID    gabapentin  600 mg Oral Q8H    heparin (porcine)  7,500 Units Subcutaneous Q8H    hydrALAZINE  50 mg Oral Q8H    levetiracetam  1,000 mg Oral BID    lisinopriL  40 mg Oral Daily       PRN Medications:   Current Facility-Administered Medications:     acetaminophen, 650 mg, Oral, Q4H PRN    bisacodyL, 10 mg, Rectal, Daily PRN    hydrALAZINE, 10 mg, Intravenous, Q4H PRN    labetalol, 10 mg, Intravenous, Q4H PRN    morphine, 2 mg, Intravenous, Q6H PRN    naloxone, 0.4 mg, Intravenous, PRN    ondansetron, 4 mg, Intravenous, Q12H PRN    oxyCODONE, 5 mg, Oral, Q6H PRN    sodium chloride 0.9%, 10 mL, Intravenous, PRN    Family History       Problem Relation (Age of Onset)    No Known Problems Mother, Father          Tobacco Use    Smoking status: Some Days     Current packs/day: 0.00     Average packs/day: 1 pack/day for 53.9 years (53.7 ttl pk-yrs)     Types: Cigarettes     Start date:      Last attempt to quit: 2023     Years since quittin.9    Smokeless tobacco: Never    Tobacco comments:     currently smoking <5 cigarettes most days   Substance and Sexual Activity    Alcohol use: Yes     Alcohol/week: 1.0 standard drink of alcohol     Types: 1 Glasses of wine per week     Comment: rarely    Drug use: No    Sexual activity: Not  Currently     Partners: Male     Review of Systems   Unable to perform ROS: Other     Objective:     Vital Signs (Most Recent):  Temp: 98.5 °F (36.9 °C) (12/09/24 1136)  Pulse: 67 (12/09/24 1136)  Resp: 18 (12/09/24 1136)  BP: (!) 115/55 (12/09/24 1136)  SpO2: 96 % (12/09/24 1136)    Vital Signs (24h Range):  Temp:  [97.9 °F (36.6 °C)-98.5 °F (36.9 °C)] 98.5 °F (36.9 °C)  Pulse:  [57-85] 67  Resp:  [18] 18  SpO2:  [93 %-96 %] 96 %  BP: (106-124)/(55-61) 115/55     Body mass index is 52.62 kg/m².     Physical Exam  Vitals and nursing note reviewed.   Constitutional:       Appearance: Normal appearance.   Pulmonary:      Effort: Pulmonary effort is normal. No respiratory distress.   Abdominal:      General: There is no distension.   Musculoskeletal:      Cervical back: Normal range of motion and neck supple.      Comments: Left sided weakness.    Neurological:      General: No focal deficit present.      Mental Status: She is alert.      GCS: GCS eye subscore is 4. GCS verbal subscore is 4. GCS motor subscore is 5.      Motor: Weakness present.      Coordination: Coordination abnormal. Heel to Shin Test abnormal. Impaired rapid alternating movements.      Gait: Gait abnormal.   Psychiatric:         Speech: Speech is delayed.         Behavior: Behavior is slowed. Behavior is cooperative.               Diagnostic Results: Labs: Reviewed

## 2024-12-10 PROCEDURE — 92507 TX SP LANG VOICE COMM INDIV: CPT | Mod: HCNC

## 2024-12-10 PROCEDURE — 92526 ORAL FUNCTION THERAPY: CPT | Mod: HCNC

## 2024-12-10 PROCEDURE — 11000001 HC ACUTE MED/SURG PRIVATE ROOM: Mod: HCNC

## 2024-12-10 PROCEDURE — 97530 THERAPEUTIC ACTIVITIES: CPT | Mod: HCNC,CO

## 2024-12-10 PROCEDURE — 97530 THERAPEUTIC ACTIVITIES: CPT | Mod: HCNC

## 2024-12-10 PROCEDURE — 99024 POSTOP FOLLOW-UP VISIT: CPT | Mod: HCNC,,, | Performed by: PHYSICIAN ASSISTANT

## 2024-12-10 PROCEDURE — 25000003 PHARM REV CODE 250: Mod: HCNC | Performed by: PHYSICIAN ASSISTANT

## 2024-12-10 PROCEDURE — 63600175 PHARM REV CODE 636 W HCPCS: Mod: HCNC | Performed by: NURSE PRACTITIONER

## 2024-12-10 RX ORDER — HYDRALAZINE HYDROCHLORIDE 25 MG/1
25 TABLET, FILM COATED ORAL EVERY 8 HOURS
Status: CANCELLED | OUTPATIENT
Start: 2024-12-10

## 2024-12-10 RX ADMIN — ALUMINUM HYDROXIDE, MAGNESIUM HYDROXIDE, AND DIMETHICONE 10 ML: 400; 400; 40 SUSPENSION ORAL at 04:12

## 2024-12-10 RX ADMIN — HEPARIN SODIUM 7500 UNITS: 5000 INJECTION INTRAVENOUS; SUBCUTANEOUS at 05:12

## 2024-12-10 RX ADMIN — LEVETIRACETAM 1000 MG: 500 SOLUTION ORAL at 09:12

## 2024-12-10 RX ADMIN — ATORVASTATIN CALCIUM 20 MG: 20 TABLET, FILM COATED ORAL at 09:12

## 2024-12-10 RX ADMIN — ALUMINUM HYDROXIDE, MAGNESIUM HYDROXIDE, AND DIMETHICONE 10 ML: 400; 400; 40 SUSPENSION ORAL at 01:12

## 2024-12-10 RX ADMIN — GABAPENTIN 600 MG: 250 SOLUTION ORAL at 04:12

## 2024-12-10 RX ADMIN — HEPARIN SODIUM 7500 UNITS: 5000 INJECTION INTRAVENOUS; SUBCUTANEOUS at 04:12

## 2024-12-10 RX ADMIN — HEPARIN SODIUM 7500 UNITS: 5000 INJECTION INTRAVENOUS; SUBCUTANEOUS at 09:12

## 2024-12-10 RX ADMIN — ALUMINUM HYDROXIDE, MAGNESIUM HYDROXIDE, AND DIMETHICONE 10 ML: 400; 400; 40 SUSPENSION ORAL at 08:12

## 2024-12-10 RX ADMIN — GABAPENTIN 600 MG: 250 SOLUTION ORAL at 09:12

## 2024-12-10 RX ADMIN — LEVETIRACETAM 1000 MG: 500 SOLUTION ORAL at 08:12

## 2024-12-10 RX ADMIN — CITALOPRAM HYDROBROMIDE 10 MG: 10 TABLET ORAL at 09:12

## 2024-12-10 RX ADMIN — ALUMINUM HYDROXIDE, MAGNESIUM HYDROXIDE, AND DIMETHICONE 10 ML: 400; 400; 40 SUSPENSION ORAL at 09:12

## 2024-12-10 RX ADMIN — LISINOPRIL 40 MG: 20 TABLET ORAL at 09:12

## 2024-12-10 RX ADMIN — GABAPENTIN 600 MG: 250 SOLUTION ORAL at 05:12

## 2024-12-10 NOTE — PROGRESS NOTES
[facility] - Neurosurgery ([facility]  Neurosurgery  Progress Note    Subjective:     History of Present Illness: Isabel Coats is a 73-year-old female past medical history of hypertension, obstructive sleep apnea on CPAP, suggestive heart failure, obesity, history of gastric cancer status post chemoradiation and DVT in currently on Eliquis. She presents to [facility] L pterional crani, 7 clips applied in proximity to large irregular L MCA bifurcation aneurysm and other adjacent small aneurysms, 1 clip applied across neck of aneurysm just proximal to anterior choroidal.  She also has a current smoker she was initially referred to Neurology for workup for dizziness. She had been complaining of vertigo multiple times a day for the past several months, associated with shortness of breath, palpitation, nausea, headache, and feeling faint. She is admitted to [facility] for a higher level of care.     Post-Op Info:  Procedure(s) (LRB):  CRANIOTOMY, WITH ANEURYSM CLIPPING W/ STEALTH (Left)  CRANIOTOMY, FOR ANEURYSM OF VERTEBROBASILAR OR CAROTID CIRCULATION (Left)  DISSECTION, NERVE, USING OPERATING MICROSCOPE (Left)   20 Days Post-Op   Interval History: KARLA SCALES. Remains neuro stable. Rehab pending, PM&R following    Medications:  Continuous Infusions:  Scheduled Meds:   atorvastatin  20 mg Oral Daily    citalopram  10 mg Oral Daily    duke's soln (benadryl 30 mL, mylanta 30 mL, LIDOcaine 30 mL, nystatin 30 mL) 120 mL  10 mL Oral QID    gabapentin  600 mg Oral Q8H    heparin (porcine)  7,500 Units Subcutaneous Q8H    hydrALAZINE  50 mg Oral Q8H    levetiracetam  1,000 mg Oral BID    lisinopriL  40 mg Oral Daily     PRN Meds:  Current Facility-Administered Medications:     acetaminophen, 650 mg, Oral, Q4H PRN    bisacodyL, 10 mg, Rectal, Daily PRN    hydrALAZINE, 10 mg, Intravenous, Q4H PRN    labetalol, 10 mg, Intravenous, Q4H PRN    morphine, 2 mg, Intravenous, Q6H PRN    naloxone, 0.4 mg, Intravenous, PRN    ondansetron, 4 mg,  "Intravenous, Q12H PRN    oxyCODONE, 5 mg, Oral, Q6H PRN    sodium chloride 0.9%, 10 mL, Intravenous, PRN     Objective:     Weight: (!) 147.9 kg (326 lb)  Body mass index is 52.62 kg/m².  Vital Signs (Most Recent):  Temp: 97.7 °F (36.5 °C) (12/10/24 0826)  Pulse: 73 (12/10/24 0826)  Resp: 18 (12/10/24 0826)  BP: (!) 102/51 (12/10/24 0826)  SpO2: (!) 94 % (12/10/24 0826) Vital Signs (24h Range):  Temp:  [97.7 °F (36.5 °C)-98.8 °F (37.1 °C)] 97.7 °F (36.5 °C)  Pulse:  [67-94] 73  Resp:  [16-18] 18  SpO2:  [93 %-97 %] 94 %  BP: ()/(51-60) 102/51                         Female External Urinary Catheter w/ Suction 11/21/24 1400 (Active)   Skin no redness;no breakdown 12/09/24 0600   Tolerance no signs/symptoms of discomfort 12/09/24 0600   Suction Continuous suction at 60 mmHg 12/08/24 2000   Date of last wick change 12/08/24 12/08/24 0800   Time of last wick change 1000 12/08/24 0800   Output (mL) 200 mL 12/09/24 0502      Physical Exam  General: well developed, well nourished, no distress.   Head: normocephalic, cranial incision c/d/I with chromic suture  Mental Status: Awake, Alert, Oriented x 2  Speech: Clear with content appropriate to conversation, mild aphasia  Cranial nerves: face symmetric, CN II-XII grossly intact.   Eyes: pupils equal, round, reactive to light, EOMI.  Sensory: intact to light touch throughout  Motor Strength: Moves all extremities spontaneously with good tone. At least antigravity strength in upper and lower extremities. No focal weakness identified. No abnormal movements seen.   Follows commands x 4 extremities  Skin: Skin is warm, dry and intact.      Significant Labs:  Recent Labs   Lab 12/09/24  0522   GLU 91   *   K 5.1   *   CO2 16*   BUN 24*   CREATININE 1.0   CALCIUM 10.0   MG 2.0     Recent Labs   Lab 12/09/24  0522   WBC 5.16   HGB 9.8*   HCT 31.3*        No results for input(s): "LABPT", "INR", "APTT" in the last 48 hours.  Microbiology Results (last 7 days) "       ** No results found for the last 168 hours. **          All pertinent labs from the last 24 hours have been reviewed.    Significant Diagnostics:  No new relevant imaging to discuss.     Assessment/Plan:     * Cerebral aneurysm  Isabel Coats is a 73-year-old female past medical history of hypertension, obstructive sleep apnea on CPAP, suggestive heart failure, obesity, history of gastric cancer status post chemoradiation and DVT in currently on Eliquis. She presents to [facility] s/p L pterional crani on 11/20 with 7 clips applied in proximity to large irregular L MCA bifurcation aneurysm and other adjacent small aneurysms, 1 clip applied across neck of aneurysm just proximal to anterior choroidal.  After surgery she seemed to be gradually waking up appropriately overnight, then AM of POD1 around shift change had an unexplained decline in exam during which stat CT showed IPH in the right side of her cerebellum. She became less responsive as well as developed a L gaze preference and dense aphasia. She had a perfusion scan with questionable perfusion deficit to the left anterior temporal lobe and milrinone was started with seemingly some clinical improvement. Now gaze preference resolved, following commands x4 antigravity, able to state name but still remains aphasic.    Imaging:  CTA Head 11/20: expected post-op changes  CTH 11/21 8 AM: right sided cerebellar IPH  CTA 11/21 10 AM: stable IPH, possible frontal contusion  CTP 11/21 4 PM: possible left anterior temporal perfusion deficit in territory of branches distal to MCA clips   MRI brain w wo 11/22: no strokes, post op changes, stable cerebellar bleed. some edema in right frontal lobe.    Plans:  -Admitted to NSGY floor, q4h nc/vs  -SBP <160  -drain removed 11/25  -keppra 1g BID postop. cEEG negative during initial AMS POD1  -Cerebral vasospasm: weaned off milrinone prior to step down  - Daily TCDs completed  - Keep euvolemic. Strict I/O's  -PT/OT/OOB  -PM&R  consulted  -SLP - tolerating current diet but with poor intake. Continue SLP. Encourage PO fluids since NGT removed 11/28. Diet advanced to regular diet.   -Continue nutritional support with boost supplementation  -Continue to monitor clinically, notify NSGY immediately with any changes in neuro status   -SQH for DVT ppx    Dispo: pending rehab        Oral thrush  - Dukes solution ordered QID  - Continue oral care     Abscess  Small R inner thigh abscess noted with seropurulent drainage.  Gen Surg consulted 11/29:  This is spontaneously and adequately draining. No significant fluctuance or induration.     -No surgical intervention indicated or recommended at this time as this area is draining well  -CTM. Wound Care following.  -Bactrim started 11/29, continue x 10 days    Pure hypercholesterolemia  - home lipitor 20       Chronic diastolic congestive heart failure    Latest ECHO  Results for orders placed during the hospital encounter of 10/22/24    Echo    Interpretation Summary    Left Ventricle: The left ventricle is normal in size. Normal wall thickness. There is normal systolic function with a visually estimated ejection fraction of 60 - 65%. There is normal diastolic function.    Right Ventricle: Normal right ventricular cavity size. Wall thickness is normal. Systolic function is normal.    Left Atrium: Left atrium is severely dilated.    Right Atrium: Right atrium is dilated.    Aortic Valve: The aortic valve is a trileaflet valve. There is moderate aortic valve sclerosis. Mildly restricted motion.    Tricuspid Valve: There is mild regurgitation.    IVC/SVC: Normal venous pressure at 3 mmHg.    Current Heart Failure Medications  hydrALAZINE injection 10 mg, Every 4 hours PRN, Intravenous  labetalol 20 mg/4 mL (5 mg/mL) IV syring, Every 4 hours PRN, Intravenous  hydrALAZINE tablet 50 mg, Every 8 hours, Oral  lisinopriL tablet 40 mg, Daily, Oral    Plan  - Monitor strict I&Os every 4 hours and daily weights.     - On telemetry  - The patient's volume status is stable        Rossana Pike PA-C  Neurosurgery  [facility] - Neurosurgery ([facility])

## 2024-12-10 NOTE — SUBJECTIVE & OBJECTIVE
Interval History: NAEON. VSS. Remains neuro stable. Rehab pending, PM&R following    Medications:  Continuous Infusions:  Scheduled Meds:   atorvastatin  20 mg Oral Daily    citalopram  10 mg Oral Daily    duke's soln (benadryl 30 mL, mylanta 30 mL, LIDOcaine 30 mL, nystatin 30 mL) 120 mL  10 mL Oral QID    gabapentin  600 mg Oral Q8H    heparin (porcine)  7,500 Units Subcutaneous Q8H    hydrALAZINE  50 mg Oral Q8H    levetiracetam  1,000 mg Oral BID    lisinopriL  40 mg Oral Daily     PRN Meds:  Current Facility-Administered Medications:     acetaminophen, 650 mg, Oral, Q4H PRN    bisacodyL, 10 mg, Rectal, Daily PRN    hydrALAZINE, 10 mg, Intravenous, Q4H PRN    labetalol, 10 mg, Intravenous, Q4H PRN    morphine, 2 mg, Intravenous, Q6H PRN    naloxone, 0.4 mg, Intravenous, PRN    ondansetron, 4 mg, Intravenous, Q12H PRN    oxyCODONE, 5 mg, Oral, Q6H PRN    sodium chloride 0.9%, 10 mL, Intravenous, PRN     Objective:     Weight: (!) 147.9 kg (326 lb)  Body mass index is 52.62 kg/m².  Vital Signs (Most Recent):  Temp: 97.7 °F (36.5 °C) (12/10/24 0826)  Pulse: 73 (12/10/24 0826)  Resp: 18 (12/10/24 0826)  BP: (!) 102/51 (12/10/24 0826)  SpO2: (!) 94 % (12/10/24 0826) Vital Signs (24h Range):  Temp:  [97.7 °F (36.5 °C)-98.8 °F (37.1 °C)] 97.7 °F (36.5 °C)  Pulse:  [67-94] 73  Resp:  [16-18] 18  SpO2:  [93 %-97 %] 94 %  BP: ()/(51-60) 102/51                         Female External Urinary Catheter w/ Suction 11/21/24 1400 (Active)   Skin no redness;no breakdown 12/09/24 0600   Tolerance no signs/symptoms of discomfort 12/09/24 0600   Suction Continuous suction at 60 mmHg 12/08/24 2000   Date of last wick change 12/08/24 12/08/24 0800   Time of last wick change 1000 12/08/24 0800   Output (mL) 200 mL 12/09/24 0502      Physical Exam  General: well developed, well nourished, no distress.   Head: normocephalic, cranial incision c/d/I with chromic suture  Mental Status: Awake, Alert, Oriented x 2  Speech: Clear with  "content appropriate to conversation, mild aphasia  Cranial nerves: face symmetric, CN II-XII grossly intact.   Eyes: pupils equal, round, reactive to light, EOMI.  Sensory: intact to light touch throughout  Motor Strength: Moves all extremities spontaneously with good tone. At least antigravity strength in upper and lower extremities. No focal weakness identified. No abnormal movements seen.   Follows commands x 4 extremities  Skin: Skin is warm, dry and intact.      Significant Labs:  Recent Labs   Lab 12/09/24  0522   GLU 91   *   K 5.1   *   CO2 16*   BUN 24*   CREATININE 1.0   CALCIUM 10.0   MG 2.0     Recent Labs   Lab 12/09/24  0522   WBC 5.16   HGB 9.8*   HCT 31.3*        No results for input(s): "LABPT", "INR", "APTT" in the last 48 hours.  Microbiology Results (last 7 days)       ** No results found for the last 168 hours. **          All pertinent labs from the last 24 hours have been reviewed.    Significant Diagnostics:  No new relevant imaging to discuss.     "

## 2024-12-10 NOTE — ASSESSMENT & PLAN NOTE
Isabel Coats is a 73-year-old female past medical history of hypertension, obstructive sleep apnea on CPAP, suggestive heart failure, obesity, history of gastric cancer status post chemoradiation and DVT in currently on Eliquis. She presents to [facility] s/p L pterional crani on 11/20 with 7 clips applied in proximity to large irregular L MCA bifurcation aneurysm and other adjacent small aneurysms, 1 clip applied across neck of aneurysm just proximal to anterior choroidal.  After surgery she seemed to be gradually waking up appropriately overnight, then AM of POD1 around shift change had an unexplained decline in exam during which stat CT showed IPH in the right side of her cerebellum. She became less responsive as well as developed a L gaze preference and dense aphasia. She had a perfusion scan with questionable perfusion deficit to the left anterior temporal lobe and milrinone was started with seemingly some clinical improvement. Now gaze preference resolved, following commands x4 antigravity, able to state name but still remains aphasic.    Imaging:  CTA Head 11/20: expected post-op changes  CTH 11/21 8 AM: right sided cerebellar IPH  CTA 11/21 10 AM: stable IPH, possible frontal contusion  CTP 11/21 4 PM: possible left anterior temporal perfusion deficit in territory of branches distal to MCA clips   MRI brain w wo 11/22: no strokes, post op changes, stable cerebellar bleed. some edema in right frontal lobe.    Plans:  -Admitted to NSGY floor, q4h nc/vs  -SBP <160  -drain removed 11/25  -keppra 1g BID postop. cEEG negative during initial AMS POD1  -Cerebral vasospasm: weaned off milrinone prior to step down  - Daily TCDs completed  - Keep euvolemic. Strict I/O's  -PT/OT/OOB  -PM&R consulted  -SLP - tolerating current diet but with poor intake. Continue SLP. Encourage PO fluids since NGT removed 11/28. Diet advanced to regular diet.   -Continue nutritional support with boost supplementation  -Continue to monitor  clinically, notify NSGY immediately with any changes in neuro status   -[facility] for DVT ppx    Dispo: pending rehab

## 2024-12-10 NOTE — PLAN OF CARE
[facility] - Neurosurgery (Hospital)  Discharge Reassessment    Primary Care Provider: Lei Garcia MD    Expected Discharge Date: 12/10/2024    Reassessment (most recent)       Discharge Reassessment - 12/10/24 1404          Discharge Reassessment    Assessment Type Discharge Planning Reassessment     Did the patient's condition or plan change since previous assessment? No     Discharge Plan A Rehab     Discharge Plan B Home Health     Why the patient remains in the hospital Requires continued medical care        Post-Acute Status    Post-Acute Authorization Placement     Post-Acute Placement Status Pending payor review/awaiting authorization (if required)     Coverage Payor: [employer] [facility] SPECIAL NEEDS - CAPITATED     Discharge Delays Post-Acute Set-up                   No accepting SNFs at this time. Referral sent to [facility] and auth was submitted on yesterday 12/09/2024.     Discharge Plan A and Plan B have been determined by review of patient's clinical status, future medical and therapeutic needs, and coverage/benefits for post-acute care in coordination with multidisciplinary team members.    STEVEN Sheridan  [facility]   Ext: [phone]

## 2024-12-10 NOTE — PT/OT/SLP PROGRESS
"Occupational Therapy   Treatment  A client care conference was completed by the OTR and the SALTER prior to treatment by the OTR to discuss the patient's POC and current status.     Name: Ca Coats  MRN: [id number]  Admitting Diagnosis:  Cerebral aneurysm  20 Days Post-Op    Recommendations:     Discharge Recommendations: High Intensity Therapy  Discharge Equipment Recommendations:  lift device, hospital bed, wheelchair  Barriers to discharge:  Other (Comment) (increased assistance required)    Assessment:     Ca Coats is a 73 y.o. female with a medical diagnosis of Cerebral aneurysm.  She presents with the decreased activity tolerance and pt unable to complete sit <>stand trial this date with 2-person assistance. Unable to assess ADL performance and OOB mobility this date 2/2 pt declined. Performance deficits affecting function are weakness, gait instability, decreased ROM, decreased lower extremity function, impaired balance, impaired endurance, decreased safety awareness, pain, impaired self care skills, impaired functional mobility, decreased coordination, impaired cognition.     Rehab Prognosis:  Good; patient would benefit from acute skilled OT services to address these deficits and reach maximum level of function.       Plan:     Patient to be seen 4 x/week to address the above listed problems via self-care/home management, therapeutic activities, therapeutic exercises, cognitive retraining, neuromuscular re-education  Plan of Care Expires: 01/24/25  Plan of Care Reviewed with: patient    Subjective     Chief Complaint: Pt stated "no" when asked to try sit <>stand trial  Patient/Family Comments/goals: Pt agreeable to tx session. "That's nice," pt stated referring to returning back to supine [id number].  Pain/Comfort:  Pain Rating 1:  (pt did not quantify)  Location - Orientation 1: generalized  Location 1:  (pt did not specify where)  Pain Addressed 1: Nurse notified, Reposition, " Distraction, Cessation of Activity    Objective:   Additional Staff Present: Radha Carranza    Communicated with: nurse, Loco, prior to session.  Patient found supine HOB elevated with telemetry, bed alarm, pressure relief boots, PureWick, SCD upon OT entry to room.    General Precautions: Standard, aspiration, aphasia, fall, seizure    Orthopedic Precautions:N/A  Braces: N/A  Respiratory Status: Room air     Occupational Performance:     Bed Mobility:    Patient completed Rolling/Turning to Right with contact guard assistance and with side rail, HOB flat  Patient completed Scooting/Bridging with total assistance and 2 persons via draw sheet/ chuckpad; scooting towards EOB while seated with Min A, then required Max A to scoot R hip forward  Patient completed Supine to Sit with minimum assistance and 2 persons  Patient completed Sit to Supine with maximal assistance for guiding of trunk and management of B LE    Functional Mobility/Transfers:  Pt attempted sit <>stand trial from EOB with total A x 2 persons and HHA; pt unable to clear buttocks from EOB      Activities of Daily Living:  Pt politely declined to perform this date      [facility] 6 Click ADL: 13    Treatment & Education:  Pt performed bed mobility, functional mobility/transfers, and ADLs as documented above.     Pt educated and instructed on the following:  OT POC  Role of SALTER  Use of call bell for all assistance  Addressed questions and /or concerns within SALTER scope of practice  Pt verbalized understanding     Patient left supine HOB elevated ~45* degrees with all lines intact, call button in reach, bed alarm on, nurse notified of completion of tx session, and pt appears in NAD    GOALS:   Multidisciplinary Problems       Occupational Therapy Goals          Problem: Occupational Therapy    Goal Priority Disciplines Outcome Interventions   Occupational Therapy Goal     OT, PT/OT Progressing    Description: Goals to be met by: 12/24/24     Patient  will increase functional independence with ADLs by performing:    UE Dressing with Stand-by Assistance.  LE Dressing with Minimal Assistance.  Grooming while seated with Stand-by Assistance.  Toileting from toilet/bedside commode with Minimal Assistance for hygiene and clothing management.   Supine to sit with Minimal Assistance.  Toilet transfer to toilet/bedside commode with Minimal Assistance.  Eating with Modified independence    Patient requires a hospital bed for positioning of the body in ways that are not feasible with an ordinary bed. The patient requires special positioning for pain relief, limited mobility, and/or being unable to independently make changes in body position without the use of a hospital bed. Pillows and wedges will not be adequate for resolving these positional issues.      Patient has a mobility limitation that significantly impairs their ability to participate in one or more mobility related activities of daily living in customary locations in the home. The mobility limitation cannot be sufficiently resolved by the use of a cane or walker. The use of a manual wheelchair will greatly improve the patient's ability to participate in MRADLs. The patient will use the wheelchair on a regular basis at home. They have expressed their willingness to use a manual wheelchair in the home, and have a caregiver who is available and willing to assist with the wheelchair if needed.                            Time Tracking:     OT Date of Treatment: 12/10/24  OT Start Time: 1058  OT Stop Time: 1112  OT Total Time (min): 14 min    Billable Minutes:Therapeutic Activity 14    OT/RICHARD: RICHARD     Number of RICHARD visits since last OT visit: 1    12/10/2024

## 2024-12-10 NOTE — PT/OT/SLP PROGRESS
"Physical Therapy Treatment    Patient Name:  Ca Coats   MRN:  [id number]  Admitting Diagnosis:  Cerebral aneurysm   Recent Surgery: Procedure(s) (LRB):  CRANIOTOMY, WITH ANEURYSM CLIPPING W/ STEALTH (Left)  CRANIOTOMY, FOR ANEURYSM OF VERTEBROBASILAR OR CAROTID CIRCULATION (Left)  DISSECTION, NERVE, USING OPERATING MICROSCOPE (Left) 20 Days Post-Op  Admit Date: 11/20/2024  Length of Stay: 20 days    Recommendations:     Discharge Recommendations:  High Intensity Therapy  Discharge Equipment Recommendations: lift device, hospital bed, wheelchair   Barriers to discharge: increased physical assistance required    Assessment:     Ca Coats is a 73 y.o. female admitted with a medical diagnosis of Cerebral aneurysm.  She presents with the following impairments/functional limitations:  weakness, impaired endurance, impaired self care skills, impaired functional mobility, gait instability, impaired balance, impaired cognition, decreased coordination, decreased upper extremity function, decreased lower extremity function, decreased safety awareness, impaired fine motor.     Pt with limited participation today due to reporting increased fatigue. Pt initially agreeable to participate in session however when attempting to sit up on EOB pt would not assist and stating, "maybe tomorrow." Per RN, pt has been more fatigued today. Pt could benefit from continuing to work on sitting EOB, standing and side steps next session if tolerated.     Rehab Prognosis: Good; patient would benefit from acute skilled PT services to address these deficits and reach maximum level of function.    Recent Surgery: Procedure(s) (LRB):  CRANIOTOMY, WITH ANEURYSM CLIPPING W/ STEALTH (Left)  CRANIOTOMY, FOR ANEURYSM OF VERTEBROBASILAR OR CAROTID CIRCULATION (Left)  DISSECTION, NERVE, USING OPERATING MICROSCOPE (Left) 20 Days Post-Op    Treatment Tolerated: Poorly      Activity with RN/PCT: bed mobility only with one person " "assist    Plan:     During this hospitalization, patient to be seen 4 x/week to address the identified rehab impairments via gait training, therapeutic activities, therapeutic exercises, neuromuscular re-education and progress toward the following goals:    Plan of Care Expires:  12/24/24    Subjective     RN notified prior to session.     Chief Complaint: pt having difficulty verbalizing complaints, however states, "maybe tomorrow"  Patient/Family Comments/goals: return to PLOF   Pain/Comfort:  Pain Rating 1: 0/10  Pain Rating Post-Intervention 1: 0/10      Objective:     Additional staff present: Akash (rehab tech)    Patient found HOB elevated with: telemetry, bed alarm, pressure relief boots, PureWick, SCD   Cognition:   Alert  Fluency: expressive and receptive language deficits  General Precautions: Standard, aphasia, aspiration, fall, seizure   Orthopedic Precautions:N/A   Braces: N/A   Body mass index is 52.62 kg/m².  Oxygen Device: Room Air      Vitals: BP (!) 92/51 (BP Location: Left arm, Patient Position: Sitting)   Pulse 69   Temp 97.7 °F (36.5 °C) (Oral)   Resp 18   Ht 5' 6" (1.676 m)   Wt (!) 147.9 kg (326 lb)   SpO2 95%   Breastfeeding No   BMI 52.62 kg/m²     Outcome Measures:  AM-PAC 6 CLICK MOBILITY  Turning over in bed (including adjusting bedclothes, sheets and blankets)?: 2  Sitting down on and standing up from a chair with arms (e.g., wheelchair, bedside commode, etc.): 2  Moving from lying on back to sitting on the side of the bed?: 2  Moving to and from a bed to a chair (including a wheelchair)?: 2  Need to walk in hospital room?: 2  Climbing 3-5 steps with a railing?: 1  Basic Mobility Total Score: 11     Functional Mobility:    Bed Mobility:   Supine to Sit:   Attempted to move legs towards EOB, pt initially assisting, then pulling legs back onto bed and refusing.   totalA to scoot up in bed ( 2 person assist)    Sitting Balance at Edge of Bed:  Pt refuses sitting EOB " today    Therapeutic Exercises:   Attempted to perform therapeutic exercises pt but requests therapist return tomorrow    Education:  All questions answered within PT scope of practice and to patient's satisfaction  PT role in POC to address current functional deficits    Patient left HOB elevated with all lines intact, call button in reach, and bed alarm on.    GOALS:   Multidisciplinary Problems       Physical Therapy Goals          Problem: Physical Therapy    Goal Priority Disciplines Outcome Interventions   Physical Therapy Goal     PT, PT/OT Progressing    Description: Goals to be met by: 2024     Patient will increase functional independence with mobility by performin. Supine to sit with MInimal Assistance  2. Sit to supine with Minimal Assistance  3. Sit to stand transfer with Moderate Assistance  4. Bed to chair transfer with Maximum Assistance using LRAD  5. Gait  x 5 feet with Maximum Assistance using LRAD.   6. Sitting at edge of bed x5 minutes with Stand-by Assistance  7. Lower extremity exercise program x15 reps per handout, with assistance as needed    Hospital Bed - Patient requires a hospital bed for positioning of the body in ways that are not feasible with an ordinary bed. The patient requires special positioning for pain relief, limited mobility, and/or being unable to independently make changes in body position without the use of a hospital bed. Pillows and wedges will not be adequate for resolving these positional issues.     Wheelchair - Patient has a mobility limitation that significantly impairs their ability to participate in one or more mobility related activities of daily living in customary locations in the home. The mobility limitation cannot be sufficiently resolved by the use of a cane or walker. The use of a manual wheelchair will greatly improve the patient's ability to participate in MRADLs. The patient will use the wheelchair on a regular basis at home. They have  expressed their willingness to use a manual wheelchair in the home, and have a caregiver who is available and willing to assist with the wheelchair if needed                       Time Tracking:     PT Received On: 12/10/24  PT Start Time: 1459     PT Stop Time: 1509  PT Total Time (min): 10 min     Billable Minutes:   Therapeutic Activity 10 min    Treatment Type: Treatment  PT/PTA: PT       Madyson Escalante PT, DPT  12/10/2024

## 2024-12-10 NOTE — PT/OT/SLP PROGRESS
"Speech Language Pathology Treatment    Patient Name:  Ca Coats   MRN:  [id number]  Admitting Diagnosis: Cerebral aneurysm    Recommendations:                  General Recommendations:  Dysphagia therapy, Speech/language therapy, and Cognitive-linguistic therapy  Diet recommendations:  Regular Diet - IDDSI Level 7, Liquid Diet Level: Thin liquids - IDDSI Level 0   Aspiration Precautions: Assistance with meals and Strict aspiration precautions   General Precautions: Standard, aspiration, fall  Communication strategies:  provide increased time to answer and go to room if call light pushed    Assessment:     Ca Coats is a 73 y.o. female with an SLP diagnosis of Aphasia, Dysphagia, Dysarthria, and Cognitive-Linguistic Impairment.      Subjective     "I don't know"    Pain/Comfort:  Pain Rating 1: 0/10  Pain Rating Post-Intervention 1: 0/10    Respiratory Status: Room air    Objective:     Has the patient been evaluated by SLP for swallowing?   Yes  Keep patient NPO? No     Pt being fed by PCT, coughing when SLP entered.  Pt noted to be talking while masticating dry sausage.  Swallow precautions reviewed.  Pt tolerated additional presentations without difficulty.  Dietary team entered room to take pt's menu.  Repetitions, binary choice, and some y/n questions required for pt to functionally choose food items.  Simple y/n questions answered with 60% accy.  1 step commands followed with 60% accy IND, 80% given mod cues.  Pt did not complete auto sentences.  Auto phrases completed with 60% accy.  Confrontation naming completed x1 givne max cues and binary choice.  Education provided re: aphasia, role of SLP, language stim, and POC.  Education to be ongoing.     Goals:   Multidisciplinary Problems       SLP Goals          Problem: SLP    Goal Priority Disciplines Outcome   SLP Goal     SLP Progressing   Description: Speech Language Pathology Goals  Goals expected to be met by 12/5:  1) Pt will " participate in ongoing assessment of swallow function to determine least restrictive diet.  2) Pt will participate in ongoing speech, language, cognitive evaluation to determine possible goals and poc.   3) Pt will respond to simple yes/no questions with 80% accy given min cues.  4) Pt will complete automatic speech tasks with 90% accy given min cues.  5) Pt will complete naming tasks with 80% accy given mod cues.  6) Pt will follow simple commands with 90% accy given mod cues.                                 Plan:     Patient to be seen:  4 x/week   Plan of Care expires:  12/22/24  Plan of Care reviewed with:  patient   SLP Follow-Up:  Yes       Discharge recommendations:  High Intensity Therapy   Barriers to Discharge:  Level of Skilled Assistance Needed      Time Tracking:     SLP Treatment Date:   12/10/24  Speech Start Time:  0841  Speech Stop Time:  0856     Speech Total Time (min):  15 min    Billable Minutes: Speech Therapy Individual 8 and Treatment Swallowing Dysfunction 7    12/10/2024

## 2024-12-10 NOTE — PLAN OF CARE
End of Shift Note    Pertinent Events this Shift: VSS, Pt on RA, No acute events.     Nursing Concerns: None     Pt/Family concerns: Awaiting discharge plan.     Mental Status: A/O x3-4.     Mobility:  Q2 turn, heel boots on.     Barriers towards goals/Discharge: Awaiting placement.         Yadira Jackson RN

## 2024-12-11 LAB
ALBUMIN SERPL BCP-MCNC: 3.1 G/DL (ref 3.5–5.2)
ALP SERPL-CCNC: 115 U/L (ref 40–150)
ALT SERPL W/O P-5'-P-CCNC: 27 U/L (ref 10–44)
ANION GAP SERPL CALC-SCNC: 6 MMOL/L (ref 8–16)
ANION GAP SERPL CALC-SCNC: 6 MMOL/L (ref 8–16)
AST SERPL-CCNC: 19 U/L (ref 10–40)
BASOPHILS # BLD AUTO: 0.03 K/UL (ref 0–0.2)
BASOPHILS NFR BLD: 0.5 % (ref 0–1.9)
BILIRUB SERPL-MCNC: 0.3 MG/DL (ref 0.1–1)
BUN SERPL-MCNC: 40 MG/DL (ref 8–23)
BUN SERPL-MCNC: 46 MG/DL (ref 8–23)
CALCIUM SERPL-MCNC: 10.2 MG/DL (ref 8.7–10.5)
CALCIUM SERPL-MCNC: 10.2 MG/DL (ref 8.7–10.5)
CHLORIDE SERPL-SCNC: 113 MMOL/L (ref 95–110)
CHLORIDE SERPL-SCNC: 114 MMOL/L (ref 95–110)
CO2 SERPL-SCNC: 19 MMOL/L (ref 23–29)
CO2 SERPL-SCNC: 20 MMOL/L (ref 23–29)
CREAT SERPL-MCNC: 1.2 MG/DL (ref 0.5–1.4)
CREAT SERPL-MCNC: 1.6 MG/DL (ref 0.5–1.4)
DIFFERENTIAL METHOD BLD: ABNORMAL
EOSINOPHIL # BLD AUTO: 0.3 K/UL (ref 0–0.5)
EOSINOPHIL NFR BLD: 4.3 % (ref 0–8)
ERYTHROCYTE [DISTWIDTH] IN BLOOD BY AUTOMATED COUNT: 13.6 % (ref 11.5–14.5)
EST. GFR  (NO RACE VARIABLE): 34.3 ML/MIN/1.73 M^2
EST. GFR  (NO RACE VARIABLE): 48.4 ML/MIN/1.73 M^2
GLUCOSE SERPL-MCNC: 100 MG/DL (ref 70–110)
GLUCOSE SERPL-MCNC: 103 MG/DL (ref 70–110)
HCT VFR BLD AUTO: 32.6 % (ref 37–48.5)
HGB BLD-MCNC: 10.1 G/DL (ref 12–16)
IMM GRANULOCYTES # BLD AUTO: 0.02 K/UL (ref 0–0.04)
IMM GRANULOCYTES NFR BLD AUTO: 0.3 % (ref 0–0.5)
LYMPHOCYTES # BLD AUTO: 2.1 K/UL (ref 1–4.8)
LYMPHOCYTES NFR BLD: 34.2 % (ref 18–48)
MAGNESIUM SERPL-MCNC: 2.3 MG/DL (ref 1.6–2.6)
MCH RBC QN AUTO: 33 PG (ref 27–31)
MCHC RBC AUTO-ENTMCNC: 31 G/DL (ref 32–36)
MCV RBC AUTO: 107 FL (ref 82–98)
MONOCYTES # BLD AUTO: 0.8 K/UL (ref 0.3–1)
MONOCYTES NFR BLD: 12.5 % (ref 4–15)
NEUTROPHILS # BLD AUTO: 3 K/UL (ref 1.8–7.7)
NEUTROPHILS NFR BLD: 48.2 % (ref 38–73)
NRBC BLD-RTO: 0 /100 WBC
PHOSPHATE SERPL-MCNC: 3.1 MG/DL (ref 2.7–4.5)
PLATELET # BLD AUTO: 233 K/UL (ref 150–450)
PMV BLD AUTO: 11.1 FL (ref 9.2–12.9)
POTASSIUM SERPL-SCNC: 5.3 MMOL/L (ref 3.5–5.1)
POTASSIUM SERPL-SCNC: 5.9 MMOL/L (ref 3.5–5.1)
PROT SERPL-MCNC: 6.5 G/DL (ref 6–8.4)
RBC # BLD AUTO: 3.06 M/UL (ref 4–5.4)
SODIUM SERPL-SCNC: 139 MMOL/L (ref 136–145)
SODIUM SERPL-SCNC: 139 MMOL/L (ref 136–145)
WBC # BLD AUTO: 6.23 K/UL (ref 3.9–12.7)

## 2024-12-11 PROCEDURE — 63600175 PHARM REV CODE 636 W HCPCS: Mod: HCNC | Performed by: NURSE PRACTITIONER

## 2024-12-11 PROCEDURE — 11000001 HC ACUTE MED/SURG PRIVATE ROOM: Mod: HCNC

## 2024-12-11 PROCEDURE — 36415 COLL VENOUS BLD VENIPUNCTURE: CPT | Mod: HCNC | Performed by: STUDENT IN AN ORGANIZED HEALTH CARE EDUCATION/TRAINING PROGRAM

## 2024-12-11 PROCEDURE — 84100 ASSAY OF PHOSPHORUS: CPT | Mod: HCNC | Performed by: PHYSICIAN ASSISTANT

## 2024-12-11 PROCEDURE — 36415 COLL VENOUS BLD VENIPUNCTURE: CPT | Mod: HCNC | Performed by: PHYSICIAN ASSISTANT

## 2024-12-11 PROCEDURE — 80048 BASIC METABOLIC PNL TOTAL CA: CPT | Mod: HCNC,XB | Performed by: STUDENT IN AN ORGANIZED HEALTH CARE EDUCATION/TRAINING PROGRAM

## 2024-12-11 PROCEDURE — 85025 COMPLETE CBC W/AUTO DIFF WBC: CPT | Mod: HCNC | Performed by: PHYSICIAN ASSISTANT

## 2024-12-11 PROCEDURE — 92507 TX SP LANG VOICE COMM INDIV: CPT | Mod: HCNC

## 2024-12-11 PROCEDURE — 92526 ORAL FUNCTION THERAPY: CPT | Mod: HCNC

## 2024-12-11 PROCEDURE — 97535 SELF CARE MNGMENT TRAINING: CPT | Mod: HCNC

## 2024-12-11 PROCEDURE — 25000003 PHARM REV CODE 250: Mod: HCNC | Performed by: STUDENT IN AN ORGANIZED HEALTH CARE EDUCATION/TRAINING PROGRAM

## 2024-12-11 PROCEDURE — 25000003 PHARM REV CODE 250: Mod: HCNC | Performed by: PHYSICIAN ASSISTANT

## 2024-12-11 PROCEDURE — 83735 ASSAY OF MAGNESIUM: CPT | Mod: HCNC | Performed by: PHYSICIAN ASSISTANT

## 2024-12-11 PROCEDURE — 80053 COMPREHEN METABOLIC PANEL: CPT | Mod: HCNC | Performed by: PHYSICIAN ASSISTANT

## 2024-12-11 RX ADMIN — ALUMINUM HYDROXIDE, MAGNESIUM HYDROXIDE, AND DIMETHICONE 10 ML: 400; 400; 40 SUSPENSION ORAL at 01:12

## 2024-12-11 RX ADMIN — GABAPENTIN 600 MG: 250 SOLUTION ORAL at 05:12

## 2024-12-11 RX ADMIN — ALUMINUM HYDROXIDE, MAGNESIUM HYDROXIDE, AND DIMETHICONE 10 ML: 400; 400; 40 SUSPENSION ORAL at 09:12

## 2024-12-11 RX ADMIN — ALUMINUM HYDROXIDE, MAGNESIUM HYDROXIDE, AND DIMETHICONE 10 ML: 400; 400; 40 SUSPENSION ORAL at 08:12

## 2024-12-11 RX ADMIN — GABAPENTIN 600 MG: 250 SOLUTION ORAL at 09:12

## 2024-12-11 RX ADMIN — HEPARIN SODIUM 7500 UNITS: 5000 INJECTION INTRAVENOUS; SUBCUTANEOUS at 05:12

## 2024-12-11 RX ADMIN — GABAPENTIN 600 MG: 250 SOLUTION ORAL at 03:12

## 2024-12-11 RX ADMIN — CITALOPRAM HYDROBROMIDE 10 MG: 10 TABLET ORAL at 09:12

## 2024-12-11 RX ADMIN — LEVETIRACETAM 1000 MG: 500 SOLUTION ORAL at 09:12

## 2024-12-11 RX ADMIN — ALUMINUM HYDROXIDE, MAGNESIUM HYDROXIDE, AND DIMETHICONE 10 ML: 400; 400; 40 SUSPENSION ORAL at 05:12

## 2024-12-11 RX ADMIN — SODIUM CHLORIDE 250 ML: 9 INJECTION, SOLUTION INTRAVENOUS at 11:12

## 2024-12-11 RX ADMIN — HEPARIN SODIUM 7500 UNITS: 5000 INJECTION INTRAVENOUS; SUBCUTANEOUS at 09:12

## 2024-12-11 RX ADMIN — LEVETIRACETAM 1000 MG: 500 SOLUTION ORAL at 08:12

## 2024-12-11 RX ADMIN — ATORVASTATIN CALCIUM 20 MG: 20 TABLET, FILM COATED ORAL at 09:12

## 2024-12-11 RX ADMIN — HEPARIN SODIUM 7500 UNITS: 5000 INJECTION INTRAVENOUS; SUBCUTANEOUS at 03:12

## 2024-12-11 NOTE — NURSING
JARET Regalado at bedside instructing pt to drink more water.  I am starting  ml IV bolus per order.

## 2024-12-11 NOTE — ASSESSMENT & PLAN NOTE
Latest ECHO  Results for orders placed during the hospital encounter of 10/22/24    Echo    Interpretation Summary    Left Ventricle: The left ventricle is normal in size. Normal wall thickness. There is normal systolic function with a visually estimated ejection fraction of 60 - 65%. There is normal diastolic function.    Right Ventricle: Normal right ventricular cavity size. Wall thickness is normal. Systolic function is normal.    Left Atrium: Left atrium is severely dilated.    Right Atrium: Right atrium is dilated.    Aortic Valve: The aortic valve is a trileaflet valve. There is moderate aortic valve sclerosis. Mildly restricted motion.    Tricuspid Valve: There is mild regurgitation.    IVC/SVC: Normal venous pressure at 3 mmHg.    Current Heart Failure Medications  hydrALAZINE injection 10 mg, Every 4 hours PRN, Intravenous  labetalol 20 mg/4 mL (5 mg/mL) IV syring, Every 4 hours PRN, Intravenous  hydrALAZINE tablet 50 mg, Every 8 hours, Oral    Plan  - Monitor strict I&Os every 4 hours and daily weights.    - On telemetry  - The patient's volume status is stable

## 2024-12-11 NOTE — SUBJECTIVE & OBJECTIVE
Interval History: 12/11: NAEO. AFVSS. Pt with new YOUNG, in the setting of poor PO water intake, given a fluid bolus with plan to repeat BMP. Lisinopril held for mild hyperkalemia. Exam stable. Pending auth for IPR.     Medications:  Continuous Infusions:  Scheduled Meds:   atorvastatin  20 mg Oral Daily    citalopram  10 mg Oral Daily    duke's soln (benadryl 30 mL, mylanta 30 mL, LIDOcaine 30 mL, nystatin 30 mL) 120 mL  10 mL Oral QID    gabapentin  600 mg Oral Q8H    heparin (porcine)  7,500 Units Subcutaneous Q8H    hydrALAZINE  50 mg Oral Q8H    levetiracetam  1,000 mg Oral BID     PRN Meds:  Current Facility-Administered Medications:     acetaminophen, 650 mg, Oral, Q4H PRN    bisacodyL, 10 mg, Rectal, Daily PRN    hydrALAZINE, 10 mg, Intravenous, Q4H PRN    labetalol, 10 mg, Intravenous, Q4H PRN    morphine, 2 mg, Intravenous, Q6H PRN    naloxone, 0.4 mg, Intravenous, PRN    ondansetron, 4 mg, Intravenous, Q12H PRN    oxyCODONE, 5 mg, Oral, Q6H PRN    sodium chloride 0.9%, 10 mL, Intravenous, PRN     Objective:     Weight: (!) 147.9 kg (326 lb)  Body mass index is 52.62 kg/m².  Vital Signs (Most Recent):  Temp: 98 °F (36.7 °C) (12/11/24 1129)  Pulse: 71 (12/11/24 1129)  Resp: 18 (12/11/24 1129)  BP: (!) 118/58 (12/11/24 1129)  SpO2: 98 % (12/11/24 1129) Vital Signs (24h Range):  Temp:  [96.9 °F (36.1 °C)-98 °F (36.7 °C)] 98 °F (36.7 °C)  Pulse:  [64-91] 71  Resp:  [16-18] 18  SpO2:  [94 %-98 %] 98 %  BP: ()/(57-67) 118/58     Date 12/11/24 0700 - 12/12/24 0659   Shift 6109-2233 0311-3407 3406-9822 24 Hour Total   INTAKE   P.O. 120   120   Shift Total(mL/kg) 120(0.8)   120(0.8)   OUTPUT   Shift Total(mL/kg)       Weight (kg) 147.9 147.9 147.9 147.9                       Female External Urinary Catheter w/ Suction 11/21/24 1400 (Active)   Skin no redness;no breakdown 12/10/24 0800   Tolerance no signs/symptoms of discomfort 12/10/24 0800   Suction Continuous suction at 60 mmHg 12/10/24 0800   Date of last  "wick change 12/10/24 12/10/24 0800   Time of last wick change 1000 12/08/24 0800   Output (mL) 200 mL 12/09/24 0502     Neurosurgery Physical Exam  General: well developed, well nourished, no distress.   Head: normocephalic, cranial incision c/d/I with chromic suture  Mental Status: Awake, Alert, Oriented x 2  Speech: Clear with content appropriate to conversation, mild aphasia  Cranial nerves: face symmetric, CN II-XII grossly intact.   Eyes: pupils equal, round, reactive to light, EOMI.  Sensory: intact to light touch throughout  Motor Strength: Moves all extremities spontaneously with good tone. At least antigravity strength in upper and lower extremities. No focal weakness identified. No abnormal movements seen.   Follows commands x 4 extremities  Skin: Skin is warm, dry and intact.      Significant Labs:  Recent Labs   Lab 12/11/24  0401         K 5.3*   *   CO2 19*   BUN 46*   CREATININE 1.6*   CALCIUM 10.2   MG 2.3     Recent Labs   Lab 12/11/24  0401   WBC 6.23   HGB 10.1*   HCT 32.6*        No results for input(s): "LABPT", "INR", "APTT" in the last 48 hours.  Microbiology Results (last 7 days)       ** No results found for the last 168 hours. **          All pertinent labs from the last 24 hours have been reviewed.    Significant Diagnostics:  No new relevant imaging to discuss.   "

## 2024-12-11 NOTE — PT/OT/SLP PROGRESS
"Speech Language Pathology Treatment    Patient Name:  Ca Coats   MRN:  [id number]  Admitting Diagnosis: Cerebral aneurysm    Recommendations:                 General Recommendations:  Dysphagia therapy, Speech/language therapy, and Cognitive-linguistic therapy  Diet recommendations:  Regular Diet - IDDSI Level 7, Liquid Diet Level: Thin liquids - IDDSI Level 0   Aspiration Precautions: Assistance with meals and Strict aspiration precautions   General Precautions: Standard, aspiration, fall  Communication strategies:  provide increased time to answer and go to room if call light pushed    Assessment:     Ca Coats is a 73 y.o. female with an SLP diagnosis of Aphasia, Dysphagia, Dysarthria, and Cognitive-Linguistic Impairment.     Subjective     "Oh I'm sorry ha ha ha"  Pt awake and alert upon arrival. Pt agreeable to participate. Nursing cleared.    Pain/Comfort:  Pain Rating 1: 0/10    Respiratory Status: Room air    Objective:     Has the patient been evaluated by SLP for swallowing?   Yes  Keep patient NPO? No   Current Respiratory Status:        Pt seen for ongoing dysphagia and speech therapy. Pt awake and alert. Pt oriented to self and was able to answer orientation questions when presented in y/n format. Pt with increased length of utterances and intelligibility this date but pt's speech continues to be unintelligible at times. Pt presented with no perseverations this date. Pt following simple 1 step commands with 60% accuracy, increasing to 100% accuracy with models. Pt answered simple y/n questions with 50% accuracy. Pt identified objects in a Fo2 with 100% accuracy but was unable to complete confrontation naming task. During this, pt observed to close L eye and reported difficulty with vision. Should continue to assess. Pt completing automatic speech tasks (counting, singing Happy Birthday) with 100% accuracy independently. Pt complete automatic speech phrases with 80% " accuracy.  Pt accepting trials of thin liquids via straw x8oz and johnathan crackers. Pt tolerating well with no difficulties. Recommend continuing regular diet with thin liquids. SLP provided education regarding role of SLP, signs of aspiration, speech strategies, recommendations, and SLP POC. Pt nodding in understanding but would benefit from reinforcement. SLP will continue to follow.     Goals:   Multidisciplinary Problems       SLP Goals          Problem: SLP    Goal Priority Disciplines Outcome   SLP Goal     SLP Progressing   Description: Speech Language Pathology Goals  Goals expected to be met by 12/5:  1) Pt will participate in ongoing assessment of swallow function to determine least restrictive diet.  2) Pt will participate in ongoing speech, language, cognitive evaluation to determine possible goals and poc.   3) Pt will respond to simple yes/no questions with 80% accy given min cues.  4) Pt will complete automatic speech tasks with 90% accy given min cues.  5) Pt will complete naming tasks with 80% accy given mod cues.  6) Pt will follow simple commands with 90% accy given mod cues.                                 Plan:     Patient to be seen:  4 x/week   Plan of Care expires:  12/22/24  Plan of Care reviewed with:  patient   SLP Follow-Up:  Yes       Discharge recommendations:  High Intensity Therapy   Barriers to Discharge:  Level of Skilled Assistance Needed      Time Tracking:     SLP Treatment Date:   12/11/24  Speech Start Time:  1046  Speech Stop Time:  1110     Speech Total Time (min):  24 min    Billable Minutes: Speech Therapy Individual 8, Treatment Swallowing Dysfunction 8, and Self Care/Home Management Training 8    12/11/2024

## 2024-12-11 NOTE — PROGRESS NOTES
[facility] - Neurosurgery ([facility])  Neurosurgery  Progress Note    Subjective:     History of Present Illness: Isabel Coats is a 73-year-old female past medical history of hypertension, obstructive sleep apnea on CPAP, suggestive heart failure, obesity, history of gastric cancer status post chemoradiation and DVT in currently on Eliquis. She presents to [facility] L pterional crani, 7 clips applied in proximity to large irregular L MCA bifurcation aneurysm and other adjacent small aneurysms, 1 clip applied across neck of aneurysm just proximal to anterior choroidal.  She also has a current smoker she was initially referred to Neurology for workup for dizziness. She had been complaining of vertigo multiple times a day for the past several months, associated with shortness of breath, palpitation, nausea, headache, and feeling faint. She is admitted to [facility] for a higher level of care.     Post-Op Info:  Procedure(s) (LRB):  CRANIOTOMY, WITH ANEURYSM CLIPPING W/ STEALTH (Left)  CRANIOTOMY, FOR ANEURYSM OF VERTEBROBASILAR OR CAROTID CIRCULATION (Left)  DISSECTION, NERVE, USING OPERATING MICROSCOPE (Left)   21 Days Post-Op   Interval History: 12/11: NAEO. AFVSS. Pt with new YOUNG, in the setting of poor PO water intake, given a fluid bolus with plan to repeat BMP. Lisinopril held for mild hyperkalemia. Exam stable. Pending auth for IPR.     Medications:  Continuous Infusions:  Scheduled Meds:   atorvastatin  20 mg Oral Daily    citalopram  10 mg Oral Daily    duke's soln (benadryl 30 mL, mylanta 30 mL, LIDOcaine 30 mL, nystatin 30 mL) 120 mL  10 mL Oral QID    gabapentin  600 mg Oral Q8H    heparin (porcine)  7,500 Units Subcutaneous Q8H    hydrALAZINE  50 mg Oral Q8H    levetiracetam  1,000 mg Oral BID     PRN Meds:  Current Facility-Administered Medications:     acetaminophen, 650 mg, Oral, Q4H PRN    bisacodyL, 10 mg, Rectal, Daily PRN    hydrALAZINE, 10 mg, Intravenous, Q4H PRN    labetalol, 10 mg, Intravenous, Q4H PRN     morphine, 2 mg, Intravenous, Q6H PRN    naloxone, 0.4 mg, Intravenous, PRN    ondansetron, 4 mg, Intravenous, Q12H PRN    oxyCODONE, 5 mg, Oral, Q6H PRN    sodium chloride 0.9%, 10 mL, Intravenous, PRN     Objective:     Weight: (!) 147.9 kg (326 lb)  Body mass index is 52.62 kg/m².  Vital Signs (Most Recent):  Temp: 98 °F (36.7 °C) (12/11/24 1129)  Pulse: 71 (12/11/24 1129)  Resp: 18 (12/11/24 1129)  BP: (!) 118/58 (12/11/24 1129)  SpO2: 98 % (12/11/24 1129) Vital Signs (24h Range):  Temp:  [96.9 °F (36.1 °C)-98 °F (36.7 °C)] 98 °F (36.7 °C)  Pulse:  [64-91] 71  Resp:  [16-18] 18  SpO2:  [94 %-98 %] 98 %  BP: ()/(57-67) 118/58     Date 12/11/24 0700 - 12/12/24 0659   Shift 2967-0581 0990-1942 5434-5083 24 Hour Total   INTAKE   P.O. 120   120   Shift Total(mL/kg) 120(0.8)   120(0.8)   OUTPUT   Shift Total(mL/kg)       Weight (kg) 147.9 147.9 147.9 147.9                       Female External Urinary Catheter w/ Suction 11/21/24 1400 (Active)   Skin no redness;no breakdown 12/10/24 0800   Tolerance no signs/symptoms of discomfort 12/10/24 0800   Suction Continuous suction at 60 mmHg 12/10/24 0800   Date of last wick change 12/10/24 12/10/24 0800   Time of last wick change 1000 12/08/24 0800   Output (mL) 200 mL 12/09/24 0502     Neurosurgery Physical Exam  General: well developed, well nourished, no distress.   Head: normocephalic, cranial incision c/d/I with chromic suture  Mental Status: Awake, Alert, Oriented x 2  Speech: Clear with content appropriate to conversation, mild aphasia  Cranial nerves: face symmetric, CN II-XII grossly intact.   Eyes: pupils equal, round, reactive to light, EOMI.  Sensory: intact to light touch throughout  Motor Strength: Moves all extremities spontaneously with good tone. At least antigravity strength in upper and lower extremities. No focal weakness identified. No abnormal movements seen.   Follows commands x 4 extremities  Skin: Skin is warm, dry and intact.      Significant  "Labs:  Recent Labs   Lab 12/11/24  0401         K 5.3*   *   CO2 19*   BUN 46*   CREATININE 1.6*   CALCIUM 10.2   MG 2.3     Recent Labs   Lab 12/11/24  0401   WBC 6.23   HGB 10.1*   HCT 32.6*        No results for input(s): "LABPT", "INR", "APTT" in the last 48 hours.  Microbiology Results (last 7 days)       ** No results found for the last 168 hours. **          All pertinent labs from the last 24 hours have been reviewed.    Significant Diagnostics:  No new relevant imaging to discuss.   Assessment/Plan:     * Cerebral aneurysm  Isabel Coats is a 73-year-old female past medical history of hypertension, obstructive sleep apnea on CPAP, suggestive heart failure, obesity, history of gastric cancer status post chemoradiation and DVT in currently on Eliquis. She presents to [facility] s/p L pterional crani on 11/20 with 7 clips applied in proximity to large irregular L MCA bifurcation aneurysm and other adjacent small aneurysms, 1 clip applied across neck of aneurysm just proximal to anterior choroidal.  After surgery she seemed to be gradually waking up appropriately overnight, then AM of POD1 around shift change had an unexplained decline in exam during which stat CT showed IPH in the right side of her cerebellum. She became less responsive as well as developed a L gaze preference and dense aphasia. She had a perfusion scan with questionable perfusion deficit to the left anterior temporal lobe and milrinone was started with seemingly some clinical improvement. Now gaze preference resolved, following commands x4 antigravity, able to state name but still remains aphasic.    Imaging:  CTA Head 11/20: expected post-op changes  CTH 11/21 8 AM: right sided cerebellar IPH  CTA 11/21 10 AM: stable IPH, possible frontal contusion  CTP 11/21 4 PM: possible left anterior temporal perfusion deficit in territory of branches distal to MCA clips   MRI brain w wo 11/22: no strokes, post op changes, stable " cerebellar bleed. some edema in right frontal lobe.    Plans:  -Admitted to NSGY floor, q4h nc/vs  -SBP <160  -drain removed 11/25  -keppra 1g BID postop. cEEG negative during initial AMS POD1  -Cerebral vasospasm: weaned off milrinone prior to step down  - Daily TCDs completed  - Keep euvolemic.   -PT/OT/OOB  -PM&R consulted  -SLP - tolerating current diet but with poor intake. Continue SLP. Encourage PO fluids since NGT removed 11/28. Diet advanced to regular diet.   -Continue nutritional support with boost supplementation  -Continue to monitor clinically, notify NSGY immediately with any changes in neuro status   -SQH for DVT ppx    Dispo: pending rehab        Oral thrush  - Dukes solution ordered QID  - Continue oral care     Abscess  Small R inner thigh abscess noted with seropurulent drainage.  Gen Surg consulted 11/29:  This is spontaneously and adequately draining. No significant fluctuance or induration.     -No surgical intervention indicated or recommended at this time as this area is draining well  -CTM. Wound Care following.  -Bactrim course completed    Pure hypercholesterolemia  - home lipitor 20       Chronic diastolic congestive heart failure    Latest ECHO  Results for orders placed during the hospital encounter of 10/22/24    Echo    Interpretation Summary    Left Ventricle: The left ventricle is normal in size. Normal wall thickness. There is normal systolic function with a visually estimated ejection fraction of 60 - 65%. There is normal diastolic function.    Right Ventricle: Normal right ventricular cavity size. Wall thickness is normal. Systolic function is normal.    Left Atrium: Left atrium is severely dilated.    Right Atrium: Right atrium is dilated.    Aortic Valve: The aortic valve is a trileaflet valve. There is moderate aortic valve sclerosis. Mildly restricted motion.    Tricuspid Valve: There is mild regurgitation.    IVC/SVC: Normal venous pressure at 3 mmHg.    Current Heart  Failure Medications  hydrALAZINE injection 10 mg, Every 4 hours PRN, Intravenous  labetalol 20 mg/4 mL (5 mg/mL) IV syring, Every 4 hours PRN, Intravenous  hydrALAZINE tablet 50 mg, Every 8 hours, Oral    Plan  - Monitor strict I&Os every 4 hours and daily weights.    - On telemetry  - The patient's volume status is stable        Natasha Rivera PA-C  Neurosurgery  [facility] - Neurosurgery ([facility])

## 2024-12-11 NOTE — ASSESSMENT & PLAN NOTE
Isabel Coats is a 73-year-old female past medical history of hypertension, obstructive sleep apnea on CPAP, suggestive heart failure, obesity, history of gastric cancer status post chemoradiation and DVT in currently on Eliquis. She presents to [facility] s/p L pterional crani on 11/20 with 7 clips applied in proximity to large irregular L MCA bifurcation aneurysm and other adjacent small aneurysms, 1 clip applied across neck of aneurysm just proximal to anterior choroidal.  After surgery she seemed to be gradually waking up appropriately overnight, then AM of POD1 around shift change had an unexplained decline in exam during which stat CT showed IPH in the right side of her cerebellum. She became less responsive as well as developed a L gaze preference and dense aphasia. She had a perfusion scan with questionable perfusion deficit to the left anterior temporal lobe and milrinone was started with seemingly some clinical improvement. Now gaze preference resolved, following commands x4 antigravity, able to state name but still remains aphasic.    Imaging:  CTA Head 11/20: expected post-op changes  CTH 11/21 8 AM: right sided cerebellar IPH  CTA 11/21 10 AM: stable IPH, possible frontal contusion  CTP 11/21 4 PM: possible left anterior temporal perfusion deficit in territory of branches distal to MCA clips   MRI brain w wo 11/22: no strokes, post op changes, stable cerebellar bleed. some edema in right frontal lobe.    Plans:  -Admitted to NSGY floor, q4h nc/vs  -SBP <160  -drain removed 11/25  -keppra 1g BID postop. cEEG negative during initial AMS POD1  -Cerebral vasospasm: weaned off milrinone prior to step down  - Daily TCDs completed  - Keep euvolemic.   -PT/OT/OOB  -PM&R consulted  -SLP - tolerating current diet but with poor intake. Continue SLP. Encourage PO fluids since NGT removed 11/28. Diet advanced to regular diet.   -Continue nutritional support with boost supplementation  -Continue to monitor clinically,  notify NSGY immediately with any changes in neuro status   -[facility] for DVT ppx    Dispo: pending rehab

## 2024-12-11 NOTE — PLAN OF CARE
12/11/24 0953   Post-Acute Status   Post-Acute Authorization Placement   Post-Acute Placement Status Pending payor review/awaiting authorization (if required)  (Worker received a message from [facility] stating that the patient's auth is still pending with her insurance provider for placement.)

## 2024-12-11 NOTE — ASSESSMENT & PLAN NOTE
Small R inner thigh abscess noted with seropurulent drainage.  Gen Surg consulted 11/29:  This is spontaneously and adequately draining. No significant fluctuance or induration.     -No surgical intervention indicated or recommended at this time as this area is draining well  -CTM. Wound Care following.  -Bactrim course completed

## 2024-12-12 PROBLEM — E87.5 HYPERKALEMIA: Status: ACTIVE | Noted: 2024-12-12

## 2024-12-12 LAB
ALBUMIN SERPL BCP-MCNC: 2.9 G/DL (ref 3.5–5.2)
ALP SERPL-CCNC: 107 U/L (ref 40–150)
ALT SERPL W/O P-5'-P-CCNC: <5 U/L (ref 10–44)
ANION GAP SERPL CALC-SCNC: 3 MMOL/L (ref 8–16)
ANION GAP SERPL CALC-SCNC: 4 MMOL/L (ref 8–16)
ANION GAP SERPL CALC-SCNC: 6 MMOL/L (ref 8–16)
ANION GAP SERPL CALC-SCNC: 7 MMOL/L (ref 8–16)
AST SERPL-CCNC: 19 U/L (ref 10–40)
BASOPHILS # BLD AUTO: 0.04 K/UL (ref 0–0.2)
BASOPHILS NFR BLD: 0.7 % (ref 0–1.9)
BILIRUB SERPL-MCNC: 0.2 MG/DL (ref 0.1–1)
BUN SERPL-MCNC: 41 MG/DL (ref 8–23)
BUN SERPL-MCNC: 41 MG/DL (ref 8–23)
BUN SERPL-MCNC: 43 MG/DL (ref 8–23)
BUN SERPL-MCNC: 45 MG/DL (ref 8–23)
CA-I BLDV-SCNC: 1.4 MMOL/L (ref 1.06–1.42)
CALCIUM SERPL-MCNC: 10.1 MG/DL (ref 8.7–10.5)
CALCIUM SERPL-MCNC: 10.3 MG/DL (ref 8.7–10.5)
CALCIUM SERPL-MCNC: 9.9 MG/DL (ref 8.7–10.5)
CALCIUM SERPL-MCNC: 9.9 MG/DL (ref 8.7–10.5)
CHLORIDE SERPL-SCNC: 115 MMOL/L (ref 95–110)
CHLORIDE SERPL-SCNC: 116 MMOL/L (ref 95–110)
CK SERPL-CCNC: 13 U/L (ref 20–180)
CO2 SERPL-SCNC: 15 MMOL/L (ref 23–29)
CO2 SERPL-SCNC: 19 MMOL/L (ref 23–29)
CO2 SERPL-SCNC: 19 MMOL/L (ref 23–29)
CO2 SERPL-SCNC: 20 MMOL/L (ref 23–29)
CREAT SERPL-MCNC: 0.8 MG/DL (ref 0.5–1.4)
CREAT SERPL-MCNC: 0.9 MG/DL (ref 0.5–1.4)
CREAT SERPL-MCNC: 1 MG/DL (ref 0.5–1.4)
CREAT SERPL-MCNC: 1.1 MG/DL (ref 0.5–1.4)
DIFFERENTIAL METHOD BLD: ABNORMAL
EOSINOPHIL # BLD AUTO: 0.1 K/UL (ref 0–0.5)
EOSINOPHIL NFR BLD: 1.9 % (ref 0–8)
ERYTHROCYTE [DISTWIDTH] IN BLOOD BY AUTOMATED COUNT: 13.4 % (ref 11.5–14.5)
EST. GFR  (NO RACE VARIABLE): 53.7 ML/MIN/1.73 M^2
EST. GFR  (NO RACE VARIABLE): >60 ML/MIN/1.73 M^2
GLUCOSE SERPL-MCNC: 101 MG/DL (ref 70–110)
GLUCOSE SERPL-MCNC: 102 MG/DL (ref 70–110)
GLUCOSE SERPL-MCNC: 110 MG/DL (ref 70–110)
GLUCOSE SERPL-MCNC: 97 MG/DL (ref 70–110)
HCT VFR BLD AUTO: 32.1 % (ref 37–48.5)
HGB BLD-MCNC: 9.7 G/DL (ref 12–16)
IMM GRANULOCYTES # BLD AUTO: 0.01 K/UL (ref 0–0.04)
IMM GRANULOCYTES NFR BLD AUTO: 0.2 % (ref 0–0.5)
LYMPHOCYTES # BLD AUTO: 1.9 K/UL (ref 1–4.8)
LYMPHOCYTES NFR BLD: 32.2 % (ref 18–48)
MCH RBC QN AUTO: 32.7 PG (ref 27–31)
MCHC RBC AUTO-ENTMCNC: 30.2 G/DL (ref 32–36)
MCV RBC AUTO: 108 FL (ref 82–98)
MONOCYTES # BLD AUTO: 0.6 K/UL (ref 0.3–1)
MONOCYTES NFR BLD: 11 % (ref 4–15)
NEUTROPHILS # BLD AUTO: 3.1 K/UL (ref 1.8–7.7)
NEUTROPHILS NFR BLD: 54 % (ref 38–73)
NRBC BLD-RTO: 0 /100 WBC
PLATELET # BLD AUTO: 196 K/UL (ref 150–450)
PMV BLD AUTO: 11.1 FL (ref 9.2–12.9)
POCT GLUCOSE: 116 MG/DL (ref 70–110)
POTASSIUM SERPL-SCNC: 5.2 MMOL/L (ref 3.5–5.1)
POTASSIUM SERPL-SCNC: 5.4 MMOL/L (ref 3.5–5.1)
POTASSIUM SERPL-SCNC: 5.7 MMOL/L (ref 3.5–5.1)
POTASSIUM SERPL-SCNC: 6.4 MMOL/L (ref 3.5–5.1)
PROT SERPL-MCNC: 6.1 G/DL (ref 6–8.4)
RBC # BLD AUTO: 2.97 M/UL (ref 4–5.4)
SODIUM SERPL-SCNC: 137 MMOL/L (ref 136–145)
SODIUM SERPL-SCNC: 138 MMOL/L (ref 136–145)
SODIUM SERPL-SCNC: 138 MMOL/L (ref 136–145)
SODIUM SERPL-SCNC: 141 MMOL/L (ref 136–145)
WBC # BLD AUTO: 5.81 K/UL (ref 3.9–12.7)

## 2024-12-12 PROCEDURE — 84132 ASSAY OF SERUM POTASSIUM: CPT | Mod: HCNC

## 2024-12-12 PROCEDURE — 11000001 HC ACUTE MED/SURG PRIVATE ROOM: Mod: HCNC

## 2024-12-12 PROCEDURE — 25000242 PHARM REV CODE 250 ALT 637 W/ HCPCS: Mod: HCNC

## 2024-12-12 PROCEDURE — 94640 AIRWAY INHALATION TREATMENT: CPT | Mod: HCNC

## 2024-12-12 PROCEDURE — 85025 COMPLETE CBC W/AUTO DIFF WBC: CPT | Mod: HCNC

## 2024-12-12 PROCEDURE — 36415 COLL VENOUS BLD VENIPUNCTURE: CPT | Mod: HCNC,XB

## 2024-12-12 PROCEDURE — 97530 THERAPEUTIC ACTIVITIES: CPT | Mod: HCNC

## 2024-12-12 PROCEDURE — 82550 ASSAY OF CK (CPK): CPT | Mod: HCNC

## 2024-12-12 PROCEDURE — 84295 ASSAY OF SERUM SODIUM: CPT | Mod: HCNC

## 2024-12-12 PROCEDURE — 36415 COLL VENOUS BLD VENIPUNCTURE: CPT | Mod: HCNC | Performed by: STUDENT IN AN ORGANIZED HEALTH CARE EDUCATION/TRAINING PROGRAM

## 2024-12-12 PROCEDURE — 80048 BASIC METABOLIC PNL TOTAL CA: CPT | Mod: HCNC,XB

## 2024-12-12 PROCEDURE — 97535 SELF CARE MNGMENT TRAINING: CPT | Mod: HCNC

## 2024-12-12 PROCEDURE — 80053 COMPREHEN METABOLIC PANEL: CPT | Mod: HCNC

## 2024-12-12 PROCEDURE — 25000003 PHARM REV CODE 250: Mod: HCNC

## 2024-12-12 PROCEDURE — 25000003 PHARM REV CODE 250: Mod: JZ,JG,HCNC

## 2024-12-12 PROCEDURE — 85014 HEMATOCRIT: CPT | Mod: HCNC

## 2024-12-12 PROCEDURE — 25000003 PHARM REV CODE 250: Mod: HCNC | Performed by: STUDENT IN AN ORGANIZED HEALTH CARE EDUCATION/TRAINING PROGRAM

## 2024-12-12 PROCEDURE — 25000003 PHARM REV CODE 250: Mod: HCNC | Performed by: PHYSICIAN ASSISTANT

## 2024-12-12 PROCEDURE — 94761 N-INVAS EAR/PLS OXIMETRY MLT: CPT | Mod: HCNC,XB

## 2024-12-12 PROCEDURE — 93010 ELECTROCARDIOGRAM REPORT: CPT | Mod: HCNC,,, | Performed by: INTERNAL MEDICINE

## 2024-12-12 PROCEDURE — 92526 ORAL FUNCTION THERAPY: CPT | Mod: HCNC

## 2024-12-12 PROCEDURE — 80048 BASIC METABOLIC PNL TOTAL CA: CPT | Mod: 91,HCNC | Performed by: STUDENT IN AN ORGANIZED HEALTH CARE EDUCATION/TRAINING PROGRAM

## 2024-12-12 PROCEDURE — 92507 TX SP LANG VOICE COMM INDIV: CPT | Mod: HCNC

## 2024-12-12 PROCEDURE — 36600 WITHDRAWAL OF ARTERIAL BLOOD: CPT | Mod: HCNC

## 2024-12-12 PROCEDURE — 80048 BASIC METABOLIC PNL TOTAL CA: CPT | Mod: 91,HCNC,XB

## 2024-12-12 PROCEDURE — 97110 THERAPEUTIC EXERCISES: CPT | Mod: HCNC,CQ

## 2024-12-12 PROCEDURE — 63600175 PHARM REV CODE 636 W HCPCS: Mod: HCNC | Performed by: NURSE PRACTITIONER

## 2024-12-12 PROCEDURE — 82565 ASSAY OF CREATININE: CPT | Mod: HCNC

## 2024-12-12 PROCEDURE — 97112 NEUROMUSCULAR REEDUCATION: CPT | Mod: HCNC,CQ

## 2024-12-12 PROCEDURE — 82330 ASSAY OF CALCIUM: CPT | Mod: HCNC

## 2024-12-12 PROCEDURE — 82803 BLOOD GASES ANY COMBINATION: CPT | Mod: HCNC

## 2024-12-12 PROCEDURE — 93005 ELECTROCARDIOGRAM TRACING: CPT | Mod: HCNC

## 2024-12-12 PROCEDURE — 99900035 HC TECH TIME PER 15 MIN (STAT): Mod: HCNC

## 2024-12-12 PROCEDURE — 82330 ASSAY OF CALCIUM: CPT | Mod: HCNC | Performed by: STUDENT IN AN ORGANIZED HEALTH CARE EDUCATION/TRAINING PROGRAM

## 2024-12-12 RX ORDER — CALCIUM GLUCONATE 20 MG/ML
1 INJECTION, SOLUTION INTRAVENOUS ONCE
Status: DISCONTINUED | OUTPATIENT
Start: 2024-12-12 | End: 2024-12-12

## 2024-12-12 RX ORDER — CALCIUM GLUCONATE 20 MG/ML
1 INJECTION, SOLUTION INTRAVENOUS ONCE
Status: COMPLETED | OUTPATIENT
Start: 2024-12-12 | End: 2024-12-12

## 2024-12-12 RX ORDER — CALCIUM GLUCONATE 20 MG/ML
1 INJECTION, SOLUTION INTRAVENOUS EVERY 10 MIN PRN
Status: DISCONTINUED | OUTPATIENT
Start: 2024-12-12 | End: 2024-12-16 | Stop reason: HOSPADM

## 2024-12-12 RX ORDER — ALBUTEROL SULFATE 2.5 MG/.5ML
10 SOLUTION RESPIRATORY (INHALATION) ONCE
Status: COMPLETED | OUTPATIENT
Start: 2024-12-12 | End: 2024-12-12

## 2024-12-12 RX ADMIN — ALUMINUM HYDROXIDE, MAGNESIUM HYDROXIDE, AND DIMETHICONE 10 ML: 400; 400; 40 SUSPENSION ORAL at 08:12

## 2024-12-12 RX ADMIN — HEPARIN SODIUM 7500 UNITS: 5000 INJECTION INTRAVENOUS; SUBCUTANEOUS at 01:12

## 2024-12-12 RX ADMIN — CITALOPRAM HYDROBROMIDE 10 MG: 10 TABLET ORAL at 09:12

## 2024-12-12 RX ADMIN — ALBUTEROL SULFATE 10 MG: 2.5 SOLUTION RESPIRATORY (INHALATION) at 04:12

## 2024-12-12 RX ADMIN — SODIUM ZIRCONIUM CYCLOSILICATE 10 G: 5 POWDER, FOR SUSPENSION ORAL at 04:12

## 2024-12-12 RX ADMIN — SODIUM CHLORIDE 500 ML: 0.9 INJECTION, SOLUTION INTRAVENOUS at 06:12

## 2024-12-12 RX ADMIN — CALCIUM GLUCONATE 1 G: 20 INJECTION, SOLUTION INTRAVENOUS at 05:12

## 2024-12-12 RX ADMIN — LEVETIRACETAM 1000 MG: 500 SOLUTION ORAL at 09:12

## 2024-12-12 RX ADMIN — ATORVASTATIN CALCIUM 20 MG: 20 TABLET, FILM COATED ORAL at 09:12

## 2024-12-12 RX ADMIN — GABAPENTIN 600 MG: 250 SOLUTION ORAL at 01:12

## 2024-12-12 RX ADMIN — SODIUM ZIRCONIUM CYCLOSILICATE 5 G: 5 POWDER, FOR SUSPENSION ORAL at 11:12

## 2024-12-12 RX ADMIN — HEPARIN SODIUM 7500 UNITS: 5000 INJECTION INTRAVENOUS; SUBCUTANEOUS at 09:12

## 2024-12-12 RX ADMIN — SODIUM CHLORIDE 250 ML: 9 INJECTION, SOLUTION INTRAVENOUS at 08:12

## 2024-12-12 RX ADMIN — LEVETIRACETAM 1000 MG: 500 SOLUTION ORAL at 08:12

## 2024-12-12 RX ADMIN — GABAPENTIN 600 MG: 250 SOLUTION ORAL at 09:12

## 2024-12-12 RX ADMIN — HEPARIN SODIUM 7500 UNITS: 5000 INJECTION INTRAVENOUS; SUBCUTANEOUS at 05:12

## 2024-12-12 RX ADMIN — GABAPENTIN 600 MG: 250 SOLUTION ORAL at 05:12

## 2024-12-12 RX ADMIN — HYDRALAZINE HYDROCHLORIDE 50 MG: 25 TABLET ORAL at 01:12

## 2024-12-12 NOTE — ASSESSMENT & PLAN NOTE
On nightly CPAP at home; she is unsure of home settings.     PLAN:    Continue CPAP qhs if patient's functional status is not a barrier to safe use.  Caution with usage of medications that may cause respiratory suppression in the perioperative period.  Caution with patient functional status.

## 2024-12-12 NOTE — PLAN OF CARE
At approx.15:50, 12/12/2024, the Hospital Medicine consults team was alerted of a potassium value of 6.4 on repeat labs. As the patient has been trending high, a dose of lokelma had been given early in the day, but patient has yet to have a BM. An another potential etiological factor: patient may have been given pedialyte earlier today.     Following being informed of this lab result, an EKG was ordered STAT to look for abnormalities associated w/ hyperkalemia. Given a value of 6.4 constitutes a hyperkalemic emergency, the patient was additionally given 1g calcium gluconate immediately. A POCT K was next ordered STAT to r/o pseudohyperkalemia, an ABG to evaluate for acidosis, a CMP for LFTs, a repeat serum K, and a CK to evaluate for rhabdo. Results in brief: pH unconcerning, POCT K of 5.4, LFTs and CK wnl, repeat serum K on CMP at 5.7, renal function improved from prior (Cr 1.0 -> 0.8; baseline 0.8-1.0). Full results below.    Given lower K on repeat serum labs and POCT K, earlier value of 6.4 was potentially hemolyzed. The decision was then made to shift pt w/ albuterol, deferring initial plan of shifting w/ insulin. An additional dose of lokelma was ordered as well.     Now trending BMP q4h to assess for recurrence. Should repeat labs be significant for hyperkalemia overnight, recommend shifting K and placing a STAT nephrology consult for further evaluation and treatment.       ABG Results:    pH: 7.313  pCO2: 40.3  pO2: 83  HCO3: 20.4  sO2%: 95%.    Additional Labs:    POCT K: 5.4  CMP K: 5.7 (prev. potentially spurious 6.4)  BUN: 41 (prev. 43)  Cr: 0.8 (prev. 1.0)  T. Bili: 0.2  AST: 107  ALT: <5  CK: 3

## 2024-12-12 NOTE — ASSESSMENT & PLAN NOTE
"Latest ECHO  Results for orders placed during the hospital encounter of 10/22/24    Echo    Interpretation Summary    Left Ventricle: The left ventricle is normal in size. Normal wall thickness. There is normal systolic function with a visually estimated ejection fraction of 60 - 65%. There is normal diastolic function.    Right Ventricle: Normal right ventricular cavity size. Wall thickness is normal. Systolic function is normal.    Left Atrium: Left atrium is severely dilated.    Right Atrium: Right atrium is dilated.    Aortic Valve: The aortic valve is a trileaflet valve. There is moderate aortic valve sclerosis. Mildly restricted motion.    Tricuspid Valve: There is mild regurgitation.    IVC/SVC: Normal venous pressure at 3 mmHg.    Per cardiology note 11/15/24:    "Currently not having anginal symptoms and had a negative LHC in 2021. Stress echo and SPECT likely will not add information due to body habitus and PET will not be available prior to scheduled urgent surgery. She is probably at a moderate risk for cardiovascular events in a low to intermediate cardiac risk surgery. Recommend proceeding without any further cardiac testing. Discussed risks and benefits with patients and she is in agreement. "     Current Heart Failure Medications  hydrALAZINE injection 10 mg, Every 4 hours PRN, Intravenous  labetalol 20 mg/4 mL (5 mg/mL) IV syring, Every 4 hours PRN, Intravenous  hydrALAZINE tablet 50 mg, Every 8 hours, Oral    PLAN:    Latest Echo (10/22/24, as above) does not appear c/f HFpEF; however, chart review is s/f this diagnosis.  Monitor strict I&Os every 4 hours and daily weights.    On telemetry.  The patient's volume status is stable.  Appears euvolemic on exam 12/12/24.  "

## 2024-12-12 NOTE — ASSESSMENT & PLAN NOTE
Per chart review, 01/18/24:    She has tingling numbness of the hands  She has not a diabetic and does not drink alcohol excessively  She has not known to have cervical spine problems  She has no balance problems, problems with small objects or dropping things  She has not known to have carpal tunnel   She is on gabapentin that is helping    PLAN:    Continue gabapentin.   If YOUNG recurs or worsens, d/c gabapentin as this medication is renally cleared.

## 2024-12-12 NOTE — PLAN OF CARE
Problem: Adult Inpatient Plan of Care  Goal: Plan of Care Review  Outcome: Progressing     Problem: Adult Inpatient Plan of Care  Goal: Patient-Specific Goal (Individualized)  Outcome: Progressing     Problem: Adult Inpatient Plan of Care  Goal: Absence of Hospital-Acquired Illness or Injury  Outcome: Progressing     Problem: Adult Inpatient Plan of Care  Goal: Optimal Comfort and Wellbeing  Outcome: Progressing     Problem: Adult Inpatient Plan of Care  Goal: Readiness for Transition of Care  Outcome: Progressing   Pending Rehab.  Received  ml bolus this shift.

## 2024-12-12 NOTE — PROGRESS NOTES
[facility] - Neurosurgery ([facility])  Neurosurgery  Progress Note    Subjective:     History of Present Illness: Isabel Coats is a 73-year-old female past medical history of hypertension, obstructive sleep apnea on CPAP, suggestive heart failure, obesity, history of gastric cancer status post chemoradiation and DVT in currently on Eliquis. She presents to [facility] L pterional crani, 7 clips applied in proximity to large irregular L MCA bifurcation aneurysm and other adjacent small aneurysms, 1 clip applied across neck of aneurysm just proximal to anterior choroidal.  She also has a current smoker she was initially referred to Neurology for workup for dizziness. She had been complaining of vertigo multiple times a day for the past several months, associated with shortness of breath, palpitation, nausea, headache, and feeling faint. She is admitted to [facility] for a higher level of care.     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 Remains on cardiac monitoring. Hospital Medicine consulted for assistance. YOUNG improving. Pt given repeated prompts to drink water, will do so with assistance and observation. Exam stable. Pending peer to peer for rehab.     Medications:  Continuous Infusions:  Scheduled Meds:   atorvastatin  20 mg Oral Daily    citalopram  10 mg Oral Daily    duke's soln (benadryl 30 mL, mylanta 30 mL, LIDOcaine 30 mL, nystatin 30 mL) 120 mL  10 mL Oral QID    gabapentin  600 mg Oral Q8H    heparin (porcine)  7,500 Units Subcutaneous Q8H    hydrALAZINE  50 mg Oral Q8H    levetiracetam  1,000 mg Oral BID     PRN Meds:  Current Facility-Administered Medications:     acetaminophen, 650 mg, Oral, Q4H PRN    bisacodyL, 10 mg, Rectal,  Daily PRN    hydrALAZINE, 10 mg, Intravenous, Q4H PRN    labetalol, 10 mg, Intravenous, Q4H PRN    morphine, 2 mg, Intravenous, Q6H PRN    naloxone, 0.4 mg, Intravenous, PRN    ondansetron, 4 mg, Intravenous, Q12H PRN    oxyCODONE, 5 mg, Oral, Q6H PRN    sodium chloride 0.9%, 10 mL, Intravenous, PRN     Objective:     Weight: (!) 147.9 kg (326 lb)  Body mass index is 52.62 kg/m².  Vital Signs (Most Recent):  Temp: 98 °F (36.7 °C) (12/12/24 1136)  Pulse: 87 (12/12/24 1136)  Resp: 18 (12/12/24 1136)  BP: (!) 130/59 (12/12/24 1136)  SpO2: 99 % (12/12/24 1136) Vital Signs (24h Range):  Temp:  [97.6 °F (36.4 °C)-98.2 °F (36.8 °C)] 98 °F (36.7 °C)  Pulse:  [] 87  Resp:  [18] 18  SpO2:  [93 %-100 %] 99 %  BP: (103-130)/(49-60) 130/59                         Female External Urinary Catheter w/ Suction 11/21/24 1400 (Active)   Skin no redness;no breakdown 12/11/24 0715   Tolerance no signs/symptoms of discomfort 12/11/24 0715   Suction Continuous suction at 60 mmHg 12/11/24 0715   Date of last wick change 12/11/24 12/11/24 0715   Time of last wick change 1000 12/08/24 0800   Output (mL) 550 mL 12/11/24 1556      Physical Exam  General: well developed, well nourished, no distress.   Head: normocephalic, cranial incision c/d/I with chromic suture  Mental Status: Awake, Alert, Oriented x 2  Speech: Clear with content appropriate to conversation, mild aphasia  Cranial nerves: face symmetric, CN II-XII grossly intact.   Eyes: pupils equal, round, reactive to light, EOMI.  Sensory: intact to light touch throughout  Motor Strength: Moves all extremities spontaneously with good tone. At least antigravity strength in upper and lower extremities. No focal weakness identified. No abnormal movements seen.   Follows commands x 4 extremities  Skin: Skin is warm, dry and intact.       Significant Labs:  Recent Labs   Lab 12/11/24  0401 12/11/24  1600 12/12/24  0949 12/12/24  1045    103 97  --     139 138  --    K 5.3*  "5.9* 5.2* 5.4*   * 113* 115*  --    CO2 19* 20* 19*  --    BUN 46* 40* 41*  --    CREATININE 1.6* 1.2 0.9  --    CALCIUM 10.2 10.2 10.1  --    MG 2.3  --   --   --      Recent Labs   Lab 12/11/24  0401   WBC 6.23   HGB 10.1*   HCT 32.6*        No results for input(s): "LABPT", "INR", "APTT" in the last 48 hours.  Microbiology Results (last 7 days)       ** No results found for the last 168 hours. **          All pertinent labs from the last 24 hours have been reviewed.    Significant Diagnostics:  I have reviewed and interpreted all pertinent imaging results/findings within the past 24 hours.  Assessment/Plan:     * Cerebral aneurysm  Isabel Coats is a 73-year-old female past medical history of hypertension, obstructive sleep apnea on CPAP, suggestive heart failure, obesity, history of gastric cancer status post chemoradiation and DVT in currently on Eliquis. She presents to [facility] s/p L pterional crani on 11/20 with 7 clips applied in proximity to large irregular L MCA bifurcation aneurysm and other adjacent small aneurysms, 1 clip applied across neck of aneurysm just proximal to anterior choroidal.  After surgery she seemed to be gradually waking up appropriately overnight, then AM of POD1 around shift change had an unexplained decline in exam during which stat CT showed IPH in the right side of her cerebellum. She became less responsive as well as developed a L gaze preference and dense aphasia. She had a perfusion scan with questionable perfusion deficit to the left anterior temporal lobe and milrinone was started with seemingly some clinical improvement. Now gaze preference resolved, following commands x4 antigravity, able to state name but still remains aphasic.    Imaging:  CTA Head 11/20: expected post-op changes  CTH 11/21 8 AM: right sided cerebellar IPH  CTA 11/21 10 AM: stable IPH, possible frontal contusion  CTP 11/21 4 PM: possible left anterior temporal perfusion deficit in territory of " branches distal to MCA clips   MRI brain w wo 11/22: no strokes, post op changes, stable cerebellar bleed. some edema in right frontal lobe.    Plans:  -Admitted to NSGY floor, q4h nc/vs  -SBP <160  -drain removed 11/25  -keppra 1g BID postop. cEEG negative during initial AMS POD1  -Cerebral vasospasm: weaned off milrinone prior to step down  - Daily TCDs completed  - Keep euvolemic.   -PT/OT/OOB  -PM&R consulted  -SLP - tolerating current diet but with poor intake. Continue SLP. Encourage PO fluids since NGT removed 11/28. Diet advanced to regular diet.   -Continue nutritional support with boost supplementation  -Continue to monitor clinically, notify NSGY immediately with any changes in neuro status   -SQH for DVT ppx    Dispo: pending rehab      Oral thrush  - Dukes solution ordered QID  - Continue oral care     Abscess  Small R inner thigh abscess noted with seropurulent drainage.  Gen Surg consulted 11/29:  This is spontaneously and adequately draining. No significant fluctuance or induration.     -No surgical intervention indicated or recommended at this time as this area is draining well  -CTM. Wound Care following.  -Bactrim course completed    Pure hypercholesterolemia  - home lipitor 20       Chronic diastolic congestive heart failure    Latest ECHO  Results for orders placed during the hospital encounter of 10/22/24    Echo    Interpretation Summary    Left Ventricle: The left ventricle is normal in size. Normal wall thickness. There is normal systolic function with a visually estimated ejection fraction of 60 - 65%. There is normal diastolic function.    Right Ventricle: Normal right ventricular cavity size. Wall thickness is normal. Systolic function is normal.    Left Atrium: Left atrium is severely dilated.    Right Atrium: Right atrium is dilated.    Aortic Valve: The aortic valve is a trileaflet valve. There is moderate aortic valve sclerosis. Mildly restricted motion.    Tricuspid Valve: There is  mild regurgitation.    IVC/SVC: Normal venous pressure at 3 mmHg.    Current Heart Failure Medications  hydrALAZINE injection 10 mg, Every 4 hours PRN, Intravenous  labetalol 20 mg/4 mL (5 mg/mL) IV syring, Every 4 hours PRN, Intravenous  hydrALAZINE tablet 50 mg, Every 8 hours, Oral    Plan  - Monitor strict I&Os every 4 hours and daily weights.    - On telemetry  - The patient's volume status is stable        Natasha Rivera PA-C  Neurosurgery  [facility] - Neurosurgery ([facility])

## 2024-12-12 NOTE — ASSESSMENT & PLAN NOTE
YOUNG is likely due to pre-renal azotemia due to intravascular volume depletion. Baseline creatinine is  0.8-1.0 . Most recent creatinine and eGFR are listed below.  Recent Labs     12/11/24  0401 12/11/24  1600 12/12/24  0949   CREATININE 1.6* 1.2 0.9   EGFRNORACEVR 34.3* 48.4* >60.0     PLAN:    YOUNG is resolved  Avoid nephrotoxins and renally dose meds for GFR listed above  Monitor urine output, serial BMP, and adjust therapy as needed  Will monitor daily for changes.

## 2024-12-12 NOTE — ASSESSMENT & PLAN NOTE
Isabel Coats is a 73-year-old female past medical history of hypertension, obstructive sleep apnea on CPAP, suggestive heart failure, obesity, history of gastric cancer status post chemoradiation and DVT in currently on Eliquis. She presents to [facility] s/p L pterional crani on 11/20 with 7 clips applied in proximity to large irregular L MCA bifurcation aneurysm and other adjacent small aneurysms, 1 clip applied across neck of aneurysm just proximal to anterior choroidal.  After surgery she seemed to be gradually waking up appropriately overnight, then AM of POD1 around shift change had an unexplained decline in exam during which stat CT showed IPH in the right side of her cerebellum. She became less responsive as well as developed a L gaze preference and dense aphasia. She had a perfusion scan with questionable perfusion deficit to the left anterior temporal lobe and milrinone was started with seemingly some clinical improvement. Now gaze preference resolved, following commands x4 antigravity, able to state name but still remains aphasic.    Imaging:  CTA Head 11/20: expected post-op changes  CTH 11/21 8 AM: right sided cerebellar IPH  CTA 11/21 10 AM: stable IPH, possible frontal contusion  CTP 11/21 4 PM: possible left anterior temporal perfusion deficit in territory of branches distal to MCA clips   MRI brain w wo 11/22: no strokes, post op changes, stable cerebellar bleed. some edema in right frontal lobe.    Plans:  -Admitted to NSGY floor, q4h nc/vs  -SBP <160  -drain removed 11/25  -keppra 1g BID postop. cEEG negative during initial AMS POD1  -Cerebral vasospasm: weaned off milrinone prior to step down  - Daily TCDs completed  - Keep euvolemic.   -PT/OT/OOB  -PM&R consulted  -SLP - tolerating current diet but with poor intake. Continue SLP. Encourage PO fluids since NGT removed 11/28. Diet advanced to regular diet.   -Continue nutritional support with boost supplementation  -Continue to monitor clinically,  notify NSGY immediately with any changes in neuro status   -[facility] for DVT ppx    Dispo: pending rehab

## 2024-12-12 NOTE — CONSULTS
Dario Glover - Neurosurgery ([facility])  [facility] Medicine  History & Physical    Patient Name: Ca Coats  MRN: [id number]  Patient Class: IP- Inpatient  Admission Date: 11/20/2024  Attending Physician: Alisa Friedman MD   Primary Care Provider: Lei Garcia MD         Patient information was obtained from patient and ER records.     Subjective:     Principal Problem:Cerebral aneurysm    Chief Complaint: No chief complaint on file.       HPI: 73F w/ PMHx s/f HTN, LAURA (CPAP qhs), heart failure per chart (10/22/24 Echo w/ EF 60-65% w/ L+R ventricle normal systolic and diastolic function; moderate aortic sclerosis, severely dilated L. atrium, dilated R. atrium, mild tricuspid regurg), obesity, hx gastric cancer s/p chemoradiation, and DVT (Eliquis). Initially referred to neurology for dizziness workup; originally complaining of vertigo multiple times a day for the past several months, associated with shortness of breath, palpitation, nausea, headache, and feeling faint.     Now s/p Left Crani w/ several clips applied for cerebral aneurysm on 11/20/2024. 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 She became less responsive as well as developed a L gaze preference and dense aphasia. She had a perfusion scan with questionable perfusion deficit to the left anterior temporal lobe and milrinone was started with some clinical improvement as per chart review. Now gaze preference resolved, following commands x4 antigravity, able to state name but still remains aphasic.     Hospital medicine consulted for YOUNG (Cr 1.6 -> 1.2 -> 0.9 s/p fluids; baseline 0.8-1.0). On history, she is reluctant to drink fluids. On initial chart review, potassium very elevated, now down-trending (5.3 -> 5.9 -> 5.2).    Past Medical History:   Diagnosis Date    YOUNG (acute kidney injury) 10/15/2021    Anemia      2018    Arthritis     BMI 60.0-69.9, adult     Cataract     Chronic anticoagulation 11/27/2024    DVT in currently on Eliquis     Chronic diastolic congestive heart failure 01/27/2021    Coronary artery disease involving native coronary artery of native heart without angina pectoris 11/15/2024    Deep vein thrombosis     Depression     Dry eye syndrome     DVT of deep femoral vein, right 05/29/2023    Gastric adenocarcinoma 05/25/2021    GERD (gastroesophageal reflux disease)     Glaucoma suspect     History of gastric ulcer     History of psychiatric hospitalization     Hx of psychiatric care     Celexa, no recall of charted Effexor, Lexapro, Desyrel prescriptions    Hyperlipidemia     Hypertension     Hypothyroidism     Morbid obesity     Neuromuscular disorder     Sleep apnea     Thyroid disease        Past Surgical History:   Procedure Laterality Date    APPENDECTOMY      CATARACT EXTRACTION W/  INTRAOCULAR LENS IMPLANT Bilateral     MFIOL OU    CLIP LIGATION OF INTRACRANIAL ANEURYSM BY CRANIOTOMY Left 11/20/2024    Procedure: CRANIOTOMY, WITH ANEURYSM CLIPPING W/ STEALTH;  Surgeon: Sejal Simon MD;  Location: [facility] OR [address] [place] [address];  Service: Neurosurgery;  Laterality: Left;  left pterioral craniotomy with microsurgical clip ligation of mca and anterior chroidal aneurysm, dr. ginna hopkins co surgeon, scalp block, type and cross 2 units, neuromonitoring sep,mep,eeg, regular bed, Gary, supine spencer, icu pos    CORONARY ANGIOGRAPHY Bilateral 02/24/2021    Procedure: ANGIOGRAM, CORONARY ARTERY;  Surgeon: Cresencio Ridley MD;  Location: [facility] CATH LAB;  Service: Cardiology;  Laterality: Bilateral;  Covid test needed - ok per Danay SSCU. Pt. w/o transportation or family    CRANIOTOMY, FOR ANEURYSM OF VERTEBROBASILAR OR CAROTID CIRCULATION Left 11/20/2024    Procedure: CRANIOTOMY, FOR ANEURYSM OF VERTEBROBASILAR OR CAROTID CIRCULATION;  Surgeon: Sejal Simon MD;  Location: [facility] OR [address] [place] [address];  Service:  Neurosurgery;  Laterality: Left;  with scalp block    DISSECTION, NERVE, USING OPERATING MICROSCOPE Left 11/20/2024    Procedure: DISSECTION, NERVE, USING OPERATING MICROSCOPE;  Surgeon: Sejal Simon MD;  Location: [facility] 2ND FLR;  Service: Neurosurgery;  Laterality: Left;  with scalp block    ENDOSCOPIC ULTRASOUND OF UPPER GASTROINTESTINAL TRACT N/A 03/17/2021    Procedure: ULTRASOUND, UPPER GI TRACT, ENDOSCOPIC;  Surgeon: Gerald Anaya MD;  Location: [facility] (2ND FLR);  Service: Endoscopy;  Laterality: N/A;  Pt unable to get a ride for swab. Will do rapid test at 8:30 - ttr    ENDOSCOPIC ULTRASOUND OF UPPER GASTROINTESTINAL TRACT N/A 01/13/2022    Procedure: ULTRASOUND, UPPER GI TRACT, ENDOSCOPIC;  Surgeon: Kevin Carballo MD;  Location: [facility] (2ND FLR);  Service: Endoscopy;  Laterality: N/A;  blood thinner approval received, see telephone encounter 1/7/22-BB  rapid  instructions given verbally and sent to address on file in pt's chart-BB    ENDOSCOPIC ULTRASOUND OF UPPER GASTROINTESTINAL TRACT N/A 10/11/2022    Procedure: ULTRASOUND, UPPER GI TRACT, ENDOSCOPIC;  Surgeon: Dequan Ridley MD;  [facility]  Service: Endoscopy;  Laterality: N/A;    ENDOSCOPIC ULTRASOUND OF UPPER GASTROINTESTINAL TRACT N/A 01/24/2024    Procedure: ULTRASOUND, UPPER GI TRACT, ENDOSCOPIC;  Surgeon: Kevin Carballo MD;  [facility]  Service: Endoscopy;  Laterality: N/A;    ENDOSCOPIC ULTRASOUND OF UPPER GASTROINTESTINAL TRACT N/A 03/05/2024    Procedure: ULTRASOUND, UPPER GI TRACT, ENDOSCOPIC;  Surgeon: Kevin Carballo MD;  [facility]  Service: Endoscopy;  Laterality: N/A;  1/25 portal instr-Repeat theEUS at the next available appointment because the preparation was poor. 48hrs of liquid. Ozempic 7 day hold-eliquis approved Dr. Pelaez TE 12/12/2023-tt  2/28-precall complete-pt verbalized udnerstanding of holding Oz    ESOPHAGOGASTRODUODENOSCOPY N/A  02/05/2021    Procedure: EGD (ESOPHAGOGASTRODUODENOSCOPY);  Surgeon: Matthew Hernadez MD;  Location: [facility] (2ND FLR);  Service: Endoscopy;  Laterality: N/A;  BMI-58    Wt: 361#     COVID test at [facility] on 2/2-GT    ESOPHAGOGASTRODUODENOSCOPY N/A 03/17/2021    Procedure: EGD (ESOPHAGOGASTRODUODENOSCOPY);  Surgeon: Gerald Anaya MD;  Location: [facility] (2ND FLR);  Service: Endoscopy;  Laterality: N/A;    ESOPHAGOGASTRODUODENOSCOPY N/A 05/25/2021    Procedure: EGD (ESOPHAGOGASTRODUODENOSCOPY);  Surgeon: Kevin Carballo MD;  Location: [facility] ENDO (2ND FLR);  Service: Endoscopy;  Laterality: N/A;  ESD 2 hours  Full COVID vaccination on file. ttr    ESOPHAGOGASTRODUODENOSCOPY N/A 01/13/2022    Procedure: EGD (ESOPHAGOGASTRODUODENOSCOPY);  Surgeon: Kevin Carballo MD;  Location: [facility] ENDO (2ND FLR);  Service: Endoscopy;  Laterality: N/A;  blood thinner approval, see telephone encounter 1/7/22-BB  rapid  instructions given verbally and sent to address on file in pt's chart-BB    ESOPHAGOGASTRODUODENOSCOPY N/A 10/11/2022    Procedure: EGD (ESOPHAGOGASTRODUODENOSCOPY);  Surgeon: Dequan Ridley MD;  Location: [facility] ENDO (2ND FLR);  Service: Endoscopy;  Laterality: N/A;  She is do for EGD/EUS now. Personal history of gastric cancer. Ca Coats #[id number].   Thanks,   Kevin Fuentes MD    Pt is fully vaccinated-DS  9/14/22-Approval to hold Davian rec'd from Dr. Pelaez (see telephone encounter 9/14/22)-DS  9/14/22-Ins    ESOPHAGOGASTRODUODENOSCOPY N/A 01/24/2024    Procedure: EGD (ESOPHAGOGASTRODUODENOSCOPY);  Surgeon: Kevin Carballo MD;  Location: [facility] ENDO (2ND FLR);  Service: Endoscopy;  Laterality: N/A;  12/8/23: instructions sent via portal. eliquis approval from MD Pelaez in telephone encounter (12/12/23) -GD  1/9-precall complete-MS    ESOPHAGOGASTRODUODENOSCOPY N/A 03/05/2024    Procedure: EGD (ESOPHAGOGASTRODUODENOSCOPY);  Surgeon: Kevin Carballo MD;  Location: [facility] ([address] [place] [address]);  Service:  Endoscopy;  Laterality: N/A;    EYE SURGERY      HYSTEROSCOPIC POLYPECTOMY OF UTERUS  01/10/2024    Procedure: POLYPECTOMY, UTERUS, HYSTEROSCOPIC;  Surgeon: Pina Pickens MD;  Location: [facility] [place] OR;  Service: OB/GYN;;    HYSTEROSCOPY WITH DILATION AND CURETTAGE OF UTERUS N/A 01/10/2024    Procedure: HYSTEROSCOPY, WITH DILATION AND CURETTAGE OF UTERUS;  Surgeon: Pina Pickens MD;  Location: [facility] [place] OR;  Service: OB/GYN;  Laterality: N/A;    INJECTION OF ANESTHETIC AGENT AROUND NERVE Left 07/10/2018    Procedure: BLOCK, NERVE;  Surgeon: Samantha Vidal MD;  Location: [facility] [place] PAIN MGT;  Service: Pain Management;  Laterality: Left;  Genicular     INJECTION OF ANESTHETIC AGENT AROUND NERVE Left 07/19/2019    Procedure: Block, Nerve NERVE BLOCK GENICULAR WITH PHENOL 6%;  Surgeon: Samantha Vidal MD;  Location: [facility] [place] PAIN MGT;  Service: Pain Management;  Laterality: Left;  NEEDS CONSENT    INSERTION OF TUNNELED CENTRAL VENOUS CATHETER (CVC) WITH SUBCUTANEOUS PORT N/A 07/09/2021    Procedure: INSERTION, PORT-A-CATH;  Surgeon: Mario Paz MD;  Location: [facility] [place] CATH LAB;  Service: Radiology;  Laterality: N/A;  PORT PLACEMENT    RADIOFREQUENCY ABLATION Left 06/19/2020    Procedure: RADIOFREQUENCY ABLATION LEFT GENICULAR;  Surgeon: Tevin Clark MD;  Location: [facility] [place] PAIN MGT;  Service: Pain Management;  Laterality: Left;  Left Genicular RFA    RADIOFREQUENCY ABLATION Left 01/22/2021    Procedure: RADIOFREQUENCY ABLATION LEFT GENICULAR;  Surgeon: Tevin Clark MD;  Location: [facility] [place] PAIN MGT;  Service: Pain Management;  Laterality: Left;    RADIOFREQUENCY ABLATION Left 07/30/2021    Procedure: RADIOFREQUENCY ABLATION, GENICULAR COOLED NEED CONSENT;  Surgeon: Tevni Clark MD;  Location: [facility] [place] PAIN MGT;  Service: Pain Management;  Laterality: Left;    RADIOFREQUENCY ABLATION Left 04/22/2022    Procedure: RADIOFREQUENCY ABLATION, GENICULAR COOLED , LEFT;  Surgeon: Tevin Clark MD;  Location: [facility] [place] PAIN MGT;  Service:  Pain Management;  Laterality: Left;    RADIOFREQUENCY ABLATION Left 2022    Procedure: RADIOFREQUENCY ABLATION LEFT GENICULAR COOLED CLEARED TO HOLD ELIQUIS 3 DAYS  NEEDS CONSENT;  Surgeon: Tevin Clark MD;  [facility];  Service: Pain Management;  Laterality: Left;    TONSILLECTOMY         Review of patient's allergies indicates:  No Known Allergies    Current Facility-Administered Medications on File Prior to Encounter   Medication    0.9%  NaCl infusion    0.9%  NaCl infusion    sodium chloride 0.9% flush 10 mL     Current Outpatient Medications on File Prior to Encounter   Medication Sig    acetaminophen (TYLENOL) 500 MG tablet Take by mouth daily as needed.    atorvastatin (LIPITOR) 20 MG tablet Take 1 tablet (20 mg total) by mouth once daily.    B-complex with vitamin C (Z-BEC OR EQUIV) tablet Take 1 tablet by mouth once daily.     citalopram (CELEXA) 10 MG tablet Take 1 tablet (10 mg total) by mouth once daily.    furosemide (LASIX) 80 MG tablet Take 1 tablet (80 mg total) by mouth 2 (two) times daily.    gabapentin (NEURONTIN) 300 MG capsule Take 2 capsules (600 mg total) by mouth 3 (three) times daily.    lisinopriL (PRINIVIL,ZESTRIL) 40 MG tablet Take 1 tablet (40 mg total) by mouth once daily.    multivitamin with minerals tablet Take 1 tablet by mouth once daily.     pantoprazole (PROTONIX) 40 MG tablet Take 1 tablet (40 mg total) by mouth once daily.    semaglutide (OZEMPIC) 1 mg/dose (4 mg/3 mL) Inject 1 mg into the skin every 7 days.    CALCIUM CITRATE-VITAMIN D2 ORAL Take 1 tablet by mouth once daily.      Family History       Problem Relation (Age of Onset)    No Known Problems Mother, Father          Tobacco Use    Smoking status: Some Days     Current packs/day: 0.00     Average packs/day: 1 pack/day for 53.9 years (53.7 ttl pk-yrs)     Types: Cigarettes     Start date:      Last attempt to quit: 2023     Years since quittin.0    Smokeless tobacco: Never     Tobacco comments:     currently smoking <5 cigarettes most days   Substance and Sexual Activity    Alcohol use: Yes     Alcohol/week: 1.0 standard drink of alcohol     Types: 1 Glasses of wine per week     Comment: rarely    Drug use: No    Sexual activity: Not Currently     Partners: Male     Review of Systems   Reason unable to perform ROS: See HPI.     Objective:     Vital Signs (Most Recent):  Temp: 97.6 °F (36.4 °C) (12/12/24 0720)  Pulse: 91 (12/12/24 1113)  Resp: 18 (12/12/24 0720)  BP: (!) 103/51 (12/12/24 0720)  SpO2: 100 % (12/12/24 0720) Vital Signs (24h Range):  Temp:  [97.6 °F (36.4 °C)-98.2 °F (36.8 °C)] 97.6 °F (36.4 °C)  Pulse:  [] 91  Resp:  [18] 18  SpO2:  [93 %-100 %] 100 %  BP: (103-128)/(49-60) 103/51     Weight: (!) 147.9 kg (326 lb)  Body mass index is 52.62 kg/m².     Physical Exam  Vitals and nursing note reviewed.   Constitutional:       Appearance: Normal appearance.   Pulmonary:      Effort: Pulmonary effort is normal. No respiratory distress.   Abdominal:      General: There is no distension.   Musculoskeletal:         General: No swelling.      Cervical back: Normal range of motion and neck supple.      Right lower leg: No edema.      Left lower leg: No edema.      Comments: Left sided weakness.    Skin:     General: Skin is warm and dry.      Comments: Appears euvolemic on exam.   Neurological:      General: No focal deficit present.      Mental Status: She is alert.      Motor: Weakness present.   Psychiatric:         Speech: Speech is delayed.         Behavior: Behavior is cooperative.      Comments: Difficulty with articulating thoughts 2/2 aphasia. Responsive on interview.                Significant Labs: All pertinent labs within the past 24 hours have been reviewed.  Recent Lab Results         12/12/24  0949   12/11/24  1600        Anion Gap 4   6       BUN 41   40       Calcium 10.1   10.2       Chloride 115   113       CO2 19   20       Creatinine 0.9   1.2       eGFR  >60.0   48.4       Glucose 97   103       Potassium 5.2   5.9  Comment: *No Visible Hemolysis       Sodium 138   139               Significant Imaging: I have reviewed all pertinent imaging results/findings within the past 24 hours.  Assessment/Plan:     * Cerebral aneurysm  PLAN:    Management per primary.      YOUNG (acute kidney injury)  YOUNG is likely due to pre-renal azotemia due to intravascular volume depletion. Baseline creatinine is  0.8-1.0 . Most recent creatinine and eGFR are listed below.  Recent Labs     12/11/24  0401 12/11/24  1600 12/12/24  0949   CREATININE 1.6* 1.2 0.9   EGFRNORACEVR 34.3* 48.4* >60.0     PLAN:    YOUNG is resolved  Avoid nephrotoxins and renally dose meds for GFR listed above  Monitor urine output, serial BMP, and adjust therapy as needed  Will monitor daily for changes.    Hyperkalemia  Hyperkalemia is likely due to YOUNG.The patients most recent potassium results are listed below.  Recent Labs     12/11/24  1600 12/12/24  0949 12/12/24  1045   K 5.9* 5.2* 5.4*     Without EKG changes. Findings not concerning for hyperkalemic emergency. Now s/p 1x dose lokelma.     PLAN:    Monitor for arrhythmias with EKG and/or continuous telemetry.   Treat the hyperkalemia with Potassium Binders.   Monitor potassium: Daily  The patient's hyperkalemia is improving            Oral thrush  PLAN:    Continue Velazquez's solution (contains nystatin).      Chronic anticoagulation  Per chart review:    Sept 2021  Thrombosis of the right popliteal, posterior tibial, and peroneal veins.      History of DVT  No PE  1 Episode  Associated with reduced mobility from getting Chemo, Radiation at that time,  No long journeys, cancer,no prior surgery, No Hospital stay, no HRT  Anticoagulated   IVC filter  - none     On Anticoagulation - Apixaban     Provoked DVT     - Cancer   - Reduced Mobility   - Tobacco      On on going Anticoagulation as - she is less mobile      No Afib     PLAN:    Continue holding apixaban in  "setting of [facility].      Neuropathy  Per chart review, 01/18/24:    She has tingling numbness of the hands  She has not a diabetic and does not drink alcohol excessively  She has not known to have cervical spine problems  She has no balance problems, problems with small objects or dropping things  She has not known to have carpal tunnel   She is on gabapentin that is helping    PLAN:    Continue gabapentin.   If YOUNG recurs or worsens, d/c gabapentin as this medication is renally cleared.    Pure hypercholesterolemia  PLAN:    On home Atorvastatin 20mg qD.      Chronic diastolic congestive heart failure  Latest ECHO  Results for orders placed during the hospital encounter of 10/22/24    Echo    Interpretation Summary    Left Ventricle: The left ventricle is normal in size. Normal wall thickness. There is normal systolic function with a visually estimated ejection fraction of 60 - 65%. There is normal diastolic function.    Right Ventricle: Normal right ventricular cavity size. Wall thickness is normal. Systolic function is normal.    Left Atrium: Left atrium is severely dilated.    Right Atrium: Right atrium is dilated.    Aortic Valve: The aortic valve is a trileaflet valve. There is moderate aortic valve sclerosis. Mildly restricted motion.    Tricuspid Valve: There is mild regurgitation.    IVC/SVC: Normal venous pressure at 3 mmHg.    Per cardiology note 11/15/24:    "Currently not having anginal symptoms and had a negative LHC in 2021. Stress echo and SPECT likely will not add information due to body habitus and PET will not be available prior to scheduled urgent surgery. She is probably at a moderate risk for cardiovascular events in a low to intermediate cardiac risk surgery. Recommend proceeding without any further cardiac testing. Discussed risks and benefits with patients and she is in agreement. "     Current Heart Failure Medications  hydrALAZINE injection 10 mg, Every 4 hours PRN, Intravenous  labetalol 20 " "mg/4 mL (5 mg/mL) IV syring, Every 4 hours PRN, Intravenous  hydrALAZINE tablet 50 mg, Every 8 hours, Oral    PLAN:    Latest Echo (10/22/24, as above) does not appear c/f HFpEF; however, chart review is s/f this diagnosis.  Monitor strict I&Os every 4 hours and daily weights.    On telemetry.  The patient's volume status is stable.  Appears euvolemic on exam 12/12/24.    Gait instability  PLAN:    PT/OT on board; management per primary.      Severe obesity (BMI >= 40)  Body mass index is 52.62 kg/m². Morbid obesity complicates all aspects of disease management from diagnostic modalities to treatment. Weight loss encouraged and health benefits explained to patient.         Essential hypertension  Per Primary:    -Keep SBP <160.  -Hydralazine 50mg q8.  -Lisinopril held in setting of YOUNG (previously on 40mg qD).    PLAN:    Agree with plan as above.     Obstructive sleep apnea  On nightly CPAP at home; she is unsure of home settings.     PLAN:    Continue CPAP qhs if patient's functional status is not a barrier to safe use.  Caution with usage of medications that may cause respiratory suppression in the perioperative period.  Caution with patient functional status.    Nervous  Per chart review, 01/18/24 visit:    "Gets nervous  On Celexa  I suggest monitoring the sodium as SIADH from Celexa use and hypersecretion of ADH associated with surgery can reduce sodium in the perioperative period  She seems to be doing fairly well from a mental health standpoint and she has a supportive family"     PLAN:    Continue home citalporam.      Gastric adenocarcinoma  Per chart review:     Patient was diagnosed in 2021 with T1 gastric adenocarcinoma sp ESD. She received 4 cycles of FLOT 7 - 9/2021 but was not felt to be a surgical candidate after chemotherapy due to comorbidities. Patient subsequently treated with chemoradiation 2-3/2022. She follows with Dr. Brown, most recent surveillance EGD without evidence of recurrence. " Last CT CAP with Dr. Pelaez 11/2023.    PLAN:    Management out-pt.    Debility  Recommendations, per chart review:    -  Encourage mobility, OOB in chair, and early ambulation as appropriate  -  PT/OT evaluate and treat  -  Pain management  -  DVT prophylaxis if appropriate   -  Monitor for and prevent skin breakdown and pressure ulcers  Early mobility, repositioning/weight shifting every 20-30 minutes when sitting, turn patient every 2 hours, proper mattress/overlay and chair cushioning, pressure relief/heel protector boots     PLAN:    Management per primary; agree with plan as above.  PT/OT on board.     Tobacco abuse  Was Using Nicotine patch as needed when she has the urge to smoking. Has not needed this patch during this admission.    CT findings s/f findings suggestive of  COPD, in context of smoking.    Per chart review 01/18/24 PCP Office Visit:    Quit Smoking > 3 months     I suggested to consider stopping  smoking tobacco for its benefits in the jailene operative period and in the long term.  I Informed about risk of wound healing problem ,infection,lung complications,thrombosis with tobacco use.  Suggested quitting tobacco.  Offered tobacco cessation service.  Not known to have COPD, Bronchitis, Emphysema, wheezing , chronic phlegm.  No inhaler, oxygen use.    PLAN:    Has not required nicotine patch this admission; continue to defer.    Hypothyroidism  No history of treatment found on chart review.     TSH (01/18/24) - 0.280  Free T4 (01/18/24) - 0.99     Findings as above s/f subclinical hypothyroidism.    PLAN:    Outpatient PCP f/u for monitoring and management.      Hypercoagulable state          VTE Risk Mitigation (From admission, onward)           Ordered     heparin (porcine) injection 7,500 Units  Every 8 hours         11/25/24 0920     IP VTE HIGH RISK PATIENT  Once         11/20/24 2045     Place sequential compression device  Until discontinued         11/20/24 2045                    "Dario Glover - Neurosurgery ([facility])  Adult Nutrition  Progress Note    SUMMARY       Recommendations    Recommendation/Intervention: 1. Continue regular diet as tolerated. - Add cardiac modifiers when appropriate. 2. Review bowel regimen - LBM 11/26. 3. RD to monitor and follow up.  Goals: Meet % EEN/EPN by RD follow up.  Nutrition Goal Status: progressing towards goal  Communication of RD Recs: other (comment) (POC)    Assessment and Plan    No new Assessment & Plan notes have been filed under this hospital service since the last note was generated.  Service: Nutrition       Malnutrition Assessment                                       Reason for Assessment    Reason For Assessment: RD follow-up  Diagnosis: other (see comments) (Cerebral aneurysm)  General Information Comments: Currently on a regular diet. Pt intake improving averaging 50-75% of consumed meals since last f/u. No nausea/vomiting or GI symptoms noted.  Nutrition Discharge Planning: Cardiac diet    Nutrition Risk Screen    Nutrition Risk Screen: no indicators present    Nutrition/Diet History    Spiritual, Cultural Beliefs, Baptism Practices, Values that Affect Care: no  Food Allergies: NKFA    Anthropometrics    Temp: 97.6 °F (36.4 °C)  Height Method: Stated  Height: 5' 6" (167.6 cm)  Height (inches): 66 in  Weight Method: Bed Scale  Weight: (!) 147.9 kg (326 lb)  Weight (lb): (!) 326 lb  Ideal Body Weight (IBW), Female: 130 lb  % Ideal Body Weight, Female (lb): 209.27 %  BMI (Calculated): 52.6  BMI Grade: greater than 40 - morbid obesity       Lab/Procedures/Meds    Pertinent Labs Reviewed: reviewed  Pertinent Labs Comments: Potassium 5.2, BUN 41, albumin 3.1  Pertinent Medications Reviewed: reviewed  Pertinent Medications Comments: Atorvastatin, duke's soln, heparin, NaCl      Estimated/Assessed Needs    Weight Used For Calorie Calculations: 59 kg (130 lb 1.1 oz)  Energy Calorie Requirements (kcal): 8453-4975 kcal/day (25-30 " kcal/kg IBW)  Energy Need Method: Kcal/kg  Protein Requirements: 71-83 g/day (1.2-1.4 g/kg IBW)  Weight Used For Protein Calculations: 59 kg (130 lb 1.1 oz)  Fluid Requirements (mL): Per MD  Estimated Fluid Requirement Method: RDA Method  RDA Method (mL): 1475  CHO Requirement: 184-221 g/day      Nutrition Prescription Ordered    Current Diet Order: Regular diet (cut food small)  Current Nutrition Support Formula Ordered:  (Discontinued)  Current Nutrition Support Rate Ordered: 0 (ml)    Evaluation of Received Nutrient/Fluid Intake    Enteral Calories (kcal): 0  Enteral Protein (gm): 0  Enteral (Free Water) Fluid (mL): 0  Free Water Flush Fluid (mL): 0  I/O: - 879 net I/O  Comments: LBM 11/26    Nutrition Risk    Level of Risk/Frequency of Follow-up:  (1x/week)     Monitor and Evaluation    Food and Nutrient Intake: food and beverage intake, energy intake  Food and Nutrient Adminstration: diet order  Anthropometric Measurements: body mass index, weight change  Biochemical Data, Medical Tests and Procedures: lipid profile, inflammatory profile, glucose/endocrine profile, electrolyte and renal panel, gastrointestinal profile  Nutrition-Focused Physical Findings: overall appearance     Nutrition Follow-Up    RD Follow-up?: Yes           Josemanuel Lam MD  Department of Hospital Medicine  [facility] - Neurosurgery ([facility])

## 2024-12-12 NOTE — PT/OT/SLP PROGRESS
"Occupational Therapy  Co- Treatment    Name: Ca Coats  MRN: [id number]  Admitting Diagnosis:  Cerebral aneurysm  22 Days Post-Op    Recommendations:     Discharge Recommendations: High Intensity Therapy  Discharge Equipment Recommendations:  lift device, hospital bed, wheelchair  Barriers to discharge:   (increased assistance)    Assessment:     Ca Coats is a 73 y.o. female with a medical diagnosis of Cerebral aneurysm.  Performance deficits affecting function are weakness, impaired endurance, impaired self care skills, impaired functional mobility, gait instability, impaired balance, impaired cognition, decreased coordination, decreased upper extremity function, decreased lower extremity function, decreased safety awareness, impaired fine motor. Pt agreeable to therapy and tolerated well. Pt with improvements in command following this session. Pt able to perform EOB activities ~ 15 min with CGA <>Min A for balance. Attempted to perform 1 stand but pt with no initiation and dependent.  Pt remains limited in ADLs, functional mobility, and functional transfers. Patient has demonstrated sufficient progression to warrant high intensity therapy evidenced by objectives noted below.    Co-treat performed due to acuity and complexity of pt's medical status with 2 skilled disciplines needed to optimize pts occupational performance and to improve activity tolerance.     Rehab Prognosis:  Good; patient would benefit from acute skilled OT services to address these deficits and reach maximum level of function.       Plan:     Patient to be seen 4 x/week to address the above listed problems via self-care/home management, therapeutic activities, therapeutic exercises, neuromuscular re-education, cognitive retraining  Plan of Care Expires: 01/24/24  Plan of Care Reviewed with: patient    Subjective     Chief Complaint: none  Patient/Family Comments/goals: "Thank you ladies."  Pain/Comfort:  Pain Rating " 1: 0/10  Pain Rating Post-Intervention 1: 0/10    Objective:     Communicated with: nurse prior to session.  Patient found HOB elevated with telemetry, bed alarm, pressure relief boots, SCD, PureWick upon OT entry to room.    General Precautions: Standard, aspiration, fall    Orthopedic Precautions:N/A  Braces: N/A  Respiratory Status: Room air     Occupational Performance:     Bed Mobility:    Patient completed Rolling/Turning to Right with maximal assistance  Patient completed Scooting/Bridging with maximal assistance  Patient completed Supine to Sit with maximal assistance and 2 persons  Patient completed Sit to Supine with maximal assistance     Functional Mobility/Transfers:  Patient completed Sit <> Stand Transfer with dependence and of 2 persons  with  hand-held assist . Pt with no  initiation and resistance towards stand this date  Pot performed sitting  functional reach activity with Min A for balance to improve functional reach, dynamic sitting balance,core strength, and processing/sequencing required for sitting ADLs. Pt performed ipsilateral/contralateral reaching using B hands in all frontal planes of movement. Pt noted with impairments B shoulder flexion but able to perform task    Activities of Daily Living:  Lower Body Dressing: total assistance donned/doffed pressure shoes  Grooming:  Pt performed facial grooming sitting EOB with CGA  UB: pt doffed/donned hospital gown at bed level with Max A . Pt initiated dressing and partially assisted    Therapeutic Activity:  Pt performed shoulder flexion AAROM sitting EOB for 1x10  to improve UE strength and overall endurance required for safety and (I) in functional mobility and  occupational task    [facility] 6 Click ADL: 13    Treatment & Education:  -Education on energy conservation and task modification to maximize safety and (I) during ADLs and mobility  -Education on importance of OOB activity to improve overall activity tolerance and promote recovery  -Pt  educated to call for assistance and to transfer with hospital staff only  -Provided education regarding role of OT, POC, & discharge recommendations with pt verbalizing understanding.  Pt had no further questions & when asked whether there were any concerns pt reported none.      Patient left HOB elevated with all lines intact, call button in reach, bed alarm on, and nurse notified    GOALS:   Multidisciplinary Problems       Occupational Therapy Goals          Problem: Occupational Therapy    Goal Priority Disciplines Outcome Interventions   Occupational Therapy Goal     OT, PT/OT Progressing    Description: Goals to be met by: 12/24/24     Patient will increase functional independence with ADLs by performing:    UE Dressing with Stand-by Assistance.  LE Dressing with Minimal Assistance.  Grooming while seated with Stand-by Assistance.  Toileting from toilet/bedside commode with Minimal Assistance for hygiene and clothing management.   Supine to sit with Minimal Assistance.  Toilet transfer to toilet/bedside commode with Minimal Assistance.  Eating with Modified independence    Patient requires a hospital bed for positioning of the body in ways that are not feasible with an ordinary bed. The patient requires special positioning for pain relief, limited mobility, and/or being unable to independently make changes in body position without the use of a hospital bed. Pillows and wedges will not be adequate for resolving these positional issues.      Patient has a mobility limitation that significantly impairs their ability to participate in one or more mobility related activities of daily living in customary locations in the home. The mobility limitation cannot be sufficiently resolved by the use of a cane or walker. The use of a manual wheelchair will greatly improve the patient's ability to participate in MRADLs. The patient will use the wheelchair on a regular basis at home. They have expressed their willingness to  use a manual wheelchair in the home, and have a caregiver who is available and willing to assist with the wheelchair if needed.                            Time Tracking:     OT Date of Treatment: 12/12/24  OT Start Time: 1256  OT Stop Time: 1324  OT Total Time (min): 28 min    Billable Minutes:Self Care/Home Management 10 min  Therapeutic Activity 18 min    OT/RICHARD: OT     Number of RICHARD visits since last OT visit: 1    12/12/2024

## 2024-12-12 NOTE — ASSESSMENT & PLAN NOTE
Recommendations, per chart review:    -  Encourage mobility, OOB in chair, and early ambulation as appropriate  -  PT/OT evaluate and treat  -  Pain management  -  DVT prophylaxis if appropriate   -  Monitor for and prevent skin breakdown and pressure ulcers  Early mobility, repositioning/weight shifting every 20-30 minutes when sitting, turn patient every 2 hours, proper mattress/overlay and chair cushioning, pressure relief/heel protector boots     PLAN:    Management per primary; agree with plan as above.  PT/OT on board.

## 2024-12-12 NOTE — PT/OT/SLP PROGRESS
Physical Therapy Treatment  Co Treatment with Occupational Therapy    Patient Name:  Ca Coats   MRN:  [id number]    Recommendations:     Discharge Recommendations: High Intensity Therapy  Discharge Equipment Recommendations: lift device, hospital bed, wheelchair  Barriers to discharge:  Increased physical assistance required    Assessment:     Ca Coats is a 73 y.o. female admitted with a medical diagnosis of Cerebral aneurysm.  She presents with the following impairments/functional limitations: weakness, impaired endurance, impaired self care skills, impaired functional mobility, gait instability, impaired balance, impaired cognition, decreased coordination, decreased upper extremity function, decreased lower extremity function, decreased safety awareness, impaired fine motor.    Pt met with HOB elevated, alert, and only oriented to self. Pt tolerates session well with emphasis on bed mobility, transfers, sitting balance, and therex. Pt requires increased assistance with Supine to Sit transfers and sit to stand transfers this session. Dependence x2 persons  required for sit to stand d/t pt making no initiation to transfer. No hip clearance attained.  Pt will continue to benefit from skilled therapy services.    Rehab Prognosis: Good; patient would benefit from acute skilled PT services to address these deficits and reach maximum level of function.    Recent Surgery: Procedure(s) (LRB):  CRANIOTOMY, WITH ANEURYSM CLIPPING W/ STEALTH (Left)  CRANIOTOMY, FOR ANEURYSM OF VERTEBROBASILAR OR CAROTID CIRCULATION (Left)  DISSECTION, NERVE, USING OPERATING MICROSCOPE (Left) 22 Days Post-Op    Plan:     During this hospitalization, patient to be seen 4 x/week to address the identified rehab impairments via gait training, therapeutic activities, therapeutic exercises, neuromuscular re-education and progress toward the following goals:    Plan of Care Expires:  12/24/24    Subjective     Chief  "Complaint: none stated  Patient/Family Comments/goals: "I will walk tomorrow"  Pain/Comfort:  Pain Rating 1: 0/10  Pain Rating Post-Intervention 1: 0/10      Objective:     Communicated with RN (Jitendra) prior to session.  Patient found HOB elevated with telemetry, bed alarm, pressure relief boots, PureWick, SCD upon PTA entry to room.     General Precautions: Standard, fall, aspiration  Orthopedic Precautions: N/A  Braces: N/A  Respiratory Status: Room air     Functional Mobility:  Bed Mobility:     Rolling Right: maximal assistance  Verbal and tactile cues required for sequencing of BLES and to reach over for bedrail with LUE.   Scooting: anteriorly to EOB maximal assistance; to HOB total assistance x2 persons via drawsheet  Supine to Sit: maximal assistance for Trunk/BLEs  Sit to Supine: maximal assistance for BLEs  Transfers:     Sit to Stand:  from EOB dependence x2 persons  with no AD/JHON HHA  Pt with no initiation during standing attempt.   Balance:   Sitting Balance at EOB:  Level of Assist Required: Contact Guard Assistance-Min A  Deviations noted: slouched posture, postural maintenance aided by concavity in deflated mattress, r lateral lean once fatigued  Total Time Sitting EOB: ~15 minutes    AM-PAC 6 CLICK MOBILITY  Turning over in bed (including adjusting bedclothes, sheets and blankets)?: 2  Sitting down on and standing up from a chair with arms (e.g., wheelchair, bedside commode, etc.): 2  Moving from lying on back to sitting on the side of the bed?: 2  Moving to and from a bed to a chair (including a wheelchair)?: 2  Need to walk in hospital room?: 2  Climbing 3-5 steps with a railing?: 1  Basic Mobility Total Score: 11       Treatment & Education:  Patient provided with daily orientation and goals of this PT session.     Activities completed at EOB:  X10, AP, LAQ BLE AAROM  X10 Hip flexion PROM  X5 modified situps  X3 functional reaches  PTA provided skilled instruction  for good form and technique " and verbal and tactile cueing for continuation of and attention to task.     Pt educated to call for assistance and to transfer with hospital staff only.  Also, pt was educated on the effects of prolonged immobility and the importance of performing OOB activity and exercises to promote healing and reduce recovery time.    Co tx performed with OT due to patient need for 2 skilled therapist to ensure patient and staff safety and to accommondate for activity tolerance.    Patient left HOB elevated with all lines intact, call button in reach, bed alarm on, and RN notified.    GOALS:   Multidisciplinary Problems       Physical Therapy Goals          Problem: Physical Therapy    Goal Priority Disciplines Outcome Interventions   Physical Therapy Goal     PT, PT/OT Progressing    Description: Goals to be met by: 2024     Patient will increase functional independence with mobility by performin. Supine to sit with MInimal Assistance  2. Sit to supine with Minimal Assistance  3. Sit to stand transfer with Moderate Assistance  4. Bed to chair transfer with Maximum Assistance using LRAD  5. Gait  x 5 feet with Maximum Assistance using LRAD.   6. Sitting at edge of bed x5 minutes with Stand-by Assistance  7. Lower extremity exercise program x15 reps per handout, with assistance as needed    Hospital Bed - Patient requires a hospital bed for positioning of the body in ways that are not feasible with an ordinary bed. The patient requires special positioning for pain relief, limited mobility, and/or being unable to independently make changes in body position without the use of a hospital bed. Pillows and wedges will not be adequate for resolving these positional issues.     Wheelchair - Patient has a mobility limitation that significantly impairs their ability to participate in one or more mobility related activities of daily living in customary locations in the home. The mobility limitation cannot be sufficiently  resolved by the use of a cane or walker. The use of a manual wheelchair will greatly improve the patient's ability to participate in MRADLs. The patient will use the wheelchair on a regular basis at home. They have expressed their willingness to use a manual wheelchair in the home, and have a caregiver who is available and willing to assist with the wheelchair if needed                         Time Tracking:     PT Received On: 12/12/24  PT Start Time: 1256     PT Stop Time: 1323  PT Total Time (min): 27 min     Billable Minutes: Therapeutic Exercise 15 and Neuromuscular Re-education 12    Treatment Type: Treatment  PT/PTA: PTA     Number of PTA visits since last PT visit: 1     12/12/2024

## 2024-12-12 NOTE — HPI
73F w/ PMHx s/f HTN, LAURA (CPAP qhs), heart failure per chart (10/22/24 Echo w/ EF 60-65% w/ L+R ventricle normal systolic and diastolic function; moderate aortic sclerosis, severely dilated L. atrium, dilated R. atrium, mild tricuspid regurg), obesity, hx gastric cancer s/p chemoradiation, and DVT (Eliquis). Initially referred to neurology for dizziness workup; originally complaining of vertigo multiple times a day for the past several months, associated with shortness of breath, palpitation, nausea, headache, and feeling faint.     Now s/p Left Crani w/ several clips applied for cerebral aneurysm on 11/20/2024. 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 She became less responsive as well as developed a L gaze preference and dense aphasia. She had a perfusion scan with questionable perfusion deficit to the left anterior temporal lobe and milrinone was started with some clinical improvement as per chart review. Now gaze preference resolved, following commands x4 antigravity, able to state name but still remains aphasic.     Hospital medicine consulted for YOUNG (Cr 1.6 -> 1.2 -> 0.9 s/p fluids; baseline 0.8-1.0). On history, she is reluctant to drink fluids. On initial chart review, potassium very elevated, now down-trending (5.3 -> 5.9 -> 5.2).

## 2024-12-12 NOTE — PT/OT/SLP PROGRESS
Speech Language Pathology Treatment    Patient Name:  Ca Coats   MRN:  [id number]  Admitting Diagnosis: Cerebral aneurysm    Recommendations:                 General Recommendations:  Dysphagia therapy, Speech/language therapy, and Cognitive-linguistic therapy  Diet recommendations:  Regular Diet - IDDSI Level 7, Liquid Diet Level: Thin liquids - IDDSI Level 0   Aspiration Precautions: Assistance with meals, HOB to 90 degrees, and Strict aspiration precautions   General Precautions: Standard, aspiration, fall  Communication strategies:  provide increased time to answer and go to room if call light pushed    Assessment:     Ca Coats is a 73 y.o. female with an SLP diagnosis of Aphasia, Dysphagia, Dysarthria, and Cognitive-Linguistic Impairment.      Subjective     Pt awake and alert upon arrival. Pt agreeable to participate in session. Nursing cleared for session.    Pain/Comfort:  Pain Rating 1: 0/10  Pain Rating Post-Intervention 1: 0/10    Respiratory Status: Room air    Objective:     Has the patient been evaluated by SLP for swallowing?   Yes  Keep patient NPO? No   Current Respiratory Status:        Pt seen for ongoing dysphagia and speech therapy. Pt awake and alert. Pt oriented to self and was able to orient to place and year with cues. Pt with continued increase in participation in conversation. Pt with less frequent instances of unintelligible speech. Pt answering complex y/n questions with 5/5 accuracy independently. Pt identified objects in Fo2 with 100% accuracy. Pt identified objects with 30% accuracy independently, increasing to 50% accuracy with moderate cues. Pt notably able to better identify objects when placed on R side. Pt reporting double vision and was noted to squint L eye closed. Pt recalling daughter's name. Pt with observed memory deficits, often stating she couldn't recall information. SLP provided education regarding role of SLP, aspiration precautions,  recommendations, speech strategies, and SLP POC. Pt expressed understanding but would benefit from reinforcement. SLP will continue to follow.     Goals:   Multidisciplinary Problems       SLP Goals          Problem: SLP    Goal Priority Disciplines Outcome   SLP Goal     SLP Progressing   Description: Speech Language Pathology Goals  Goals expected to be met by 12/5:  1) Pt will participate in ongoing assessment of swallow function to determine least restrictive diet.  2) Pt will participate in ongoing speech, language, cognitive evaluation to determine possible goals and poc.   3) Pt will respond to simple yes/no questions with 80% accy given min cues.  4) Pt will complete automatic speech tasks with 90% accy given min cues.  5) Pt will complete naming tasks with 80% accy given mod cues.  6) Pt will follow simple commands with 90% accy given mod cues.                                 Plan:     Patient to be seen:  4 x/week   Plan of Care expires:  12/22/24  Plan of Care reviewed with:  patient   SLP Follow-Up:  Yes       Discharge recommendations:  High Intensity Therapy     Time Tracking:     SLP Treatment Date:   12/12/24  Speech Start Time:  1108  Speech Stop Time:  1124     Speech Total Time (min):  16 min    Billable Minutes: Speech Therapy Individual 8 and Treatment Swallowing Dysfunction 8    12/12/2024

## 2024-12-12 NOTE — ASSESSMENT & PLAN NOTE
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    "

## 2024-12-12 NOTE — PROGRESS NOTES
"[facility] - Neurosurgery ([facility])  Adult Nutrition  Progress Note    SUMMARY       Recommendations    Continue regular diet as tolerated. - Add cardiac modifiers when appropriate.    RD to monitor and follow up.    Goals: Meet % EEN/EPN by RD follow up.  Nutrition Goal Status: progressing towards goal  Communication of RD Recs: other (comment) (POC)    Assessment and Plan    Nutrition Problem  Inadequate energy intake      Related to (etiology):   Clinical course      Signs and Symptoms (as evidenced by):   NPO status (pending SLP evaluation)     Interventions/Recommendations (treatment strategy):  Collab of care w other providers     Nutrition Diagnosis Status:   Resolved           Reason for Assessment    Reason For Assessment: RD follow-up  Diagnosis: other (see comments) (Cerebral aneurysm)  General Information Comments: Currently on a regular diet. Pt's tech reports good appetite, intake improving averaging 50-75% of consumed meals since last f/u. No nausea/vomiting or GI symptoms reported.  Nutrition Discharge Planning: Cardiac diet    Nutrition Related Social Determinants of Health: SDOH: None Identified      Nutrition Risk Screen    Nutrition Risk Screen: no indicators present    Nutrition/Diet History    Spiritual, Cultural Beliefs, Orthodoxy Practices, Values that Affect Care: no  Food Allergies: NKFA    Anthropometrics    Temp: 98 °F (36.7 °C)  Height Method: Stated  Height: 5' 6" (167.6 cm)  Height (inches): 66 in  Weight Method: Bed Scale  Weight: (!) 147.9 kg (326 lb)  Weight (lb): (!) 326 lb  Ideal Body Weight (IBW), Female: 130 lb  % Ideal Body Weight, Female (lb): 209.27 %  BMI (Calculated): 52.6  BMI Grade: greater than 40 - morbid obesity       Lab/Procedures/Meds    Pertinent Labs Reviewed: reviewed  Pertinent Labs Comments: Potassium 5.2, BUN 41, albumin 3.1  Pertinent Medications Reviewed: reviewed  Pertinent Medications Comments: Atorvastatin, duke's soln, heparin, " NaCl      Estimated/Assessed Needs    Weight Used For Calorie Calculations: 59 kg (130 lb 1.1 oz)  Energy Calorie Requirements (kcal): 9077-2768 kcal/day (25-30 kcal/kg IBW)  Energy Need Method: Kcal/kg  Protein Requirements: 71-83 g/day (1.2-1.4 g/kg IBW)  Weight Used For Protein Calculations: 59 kg (130 lb 1.1 oz)  Fluid Requirements (mL): Per MD  Estimated Fluid Requirement Method: RDA Method  RDA Method (mL): 1475  CHO Requirement: 184-221 g/day      Nutrition Prescription Ordered    Current Diet Order: Regular diet (cut food small)  Current Nutrition Support Formula Ordered:  (Discontinued)  Current Nutrition Support Rate Ordered: 0 (ml)    Evaluation of Received Nutrient/Fluid Intake    Enteral Calories (kcal): 0  Enteral Protein (gm): 0  Enteral (Free Water) Fluid (mL): 0  Free Water Flush Fluid (mL): 0  I/O: - 879 net I/O  Comments: LBM 11/26  % Intake of Estimated Energy Needs: 50 - 75 %  % Meal Intake: 50 - 75 %    Nutrition Risk    Level of Risk/Frequency of Follow-up:  (1x/week)     Monitor and Evaluation    Food and Nutrient Intake: food and beverage intake, energy intake  Food and Nutrient Adminstration: diet order  Anthropometric Measurements: body mass index, weight change  Biochemical Data, Medical Tests and Procedures: lipid profile, inflammatory profile, glucose/endocrine profile, electrolyte and renal panel, gastrointestinal profile  Nutrition-Focused Physical Findings: overall appearance     Nutrition Follow-Up    RD Follow-up?: Yes    Gretchen Burton, Registration Eligible, Provisional LDN

## 2024-12-12 NOTE — ASSESSMENT & PLAN NOTE
Body mass index is 52.62 kg/m². Morbid obesity complicates all aspects of disease management from diagnostic modalities to treatment. Weight loss encouraged and health benefits explained to patient.

## 2024-12-12 NOTE — PLAN OF CARE
[facility] requesting a peer to peer for inpatient rehab placement.     REYMUNDO Rivera is available to complete peer to peer today for 3pm with Dr. Ni at [phone].    CM team to follow.     STEVEN Sheridan  [facility]   Ext: [phone]

## 2024-12-12 NOTE — ASSESSMENT & PLAN NOTE
No history of treatment found on chart review.     TSH (01/18/24) - 0.280  Free T4 (01/18/24) - 0.99     Findings as above s/f subclinical hypothyroidism.    PLAN:    Outpatient PCP f/u for monitoring and management.

## 2024-12-12 NOTE — ASSESSMENT & PLAN NOTE
Per chart review:    Sept 2021  Thrombosis of the right popliteal, posterior tibial, and peroneal veins.      History of DVT  No PE  1 Episode  Associated with reduced mobility from getting Chemo, Radiation at that time,  No long journeys, cancer,no prior surgery, No Hospital stay, no HRT  Anticoagulated   IVC filter  - none     On Anticoagulation - Apixaban     Provoked DVT     - Cancer   - Reduced Mobility   - Tobacco      On on going Anticoagulation as - she is less mobile      No Afib     PLAN:    Continue holding apixaban in setting of IPH.

## 2024-12-12 NOTE — ASSESSMENT & PLAN NOTE
Per Primary:    -Keep SBP <160.  -Hydralazine 50mg q8.  -Lisinopril held in setting of YOUNG (previously on 40mg qD).    PLAN:    Agree with plan as above.

## 2024-12-12 NOTE — SUBJECTIVE & OBJECTIVE
[facility] notified of need for transfer to [facility] room 226.   Past Medical History:   Diagnosis Date    YOUNG (acute kidney injury) 10/15/2021    Anemia     2018    Arthritis     BMI 60.0-69.9, adult     Cataract     Chronic anticoagulation 11/27/2024    DVT in currently on Eliquis     Chronic diastolic congestive heart failure 01/27/2021    Coronary artery disease involving native coronary artery of native heart without angina pectoris 11/15/2024    Deep vein thrombosis     Depression     Dry eye syndrome     DVT of deep femoral vein, right 05/29/2023    Gastric adenocarcinoma 05/25/2021    GERD (gastroesophageal reflux disease)     Glaucoma suspect     History of gastric ulcer     History of psychiatric hospitalization     Hx of psychiatric care     Celexa, no recall of charted Effexor, Lexapro, Desyrel prescriptions    Hyperlipidemia     Hypertension     Hypothyroidism     Morbid obesity     Neuromuscular disorder     Sleep apnea     Thyroid disease        Past Surgical History:   Procedure Laterality Date    APPENDECTOMY      CATARACT EXTRACTION W/  INTRAOCULAR LENS IMPLANT Bilateral     MFIOL OU    CLIP LIGATION OF INTRACRANIAL ANEURYSM BY CRANIOTOMY Left 11/20/2024    Procedure: CRANIOTOMY, WITH ANEURYSM CLIPPING W/ STEALTH;  Surgeon: Sejal Simon MD;  Location: [facility] OR [address] [place] [address];  Service: Neurosurgery;  Laterality: Left;  left pterioral craniotomy with microsurgical clip ligation of mca and anterior chroidal aneurysm, dr. ginna hopkins co surgeon, scalp block, type and cross 2 units, neuromonitoring sep,mep,eeg, regular bed, [place], supine spencer, icu pos    CORONARY ANGIOGRAPHY Bilateral 02/24/2021    Procedure: ANGIOGRAM, CORONARY ARTERY;  Surgeon: Cresencio Ridley MD;  Location: [facility] CATH LAB;  Service: Cardiology;  Laterality: Bilateral;  Covid test needed - ok per Danay SSCU. Pt. w/o transportation or family    CRANIOTOMY, FOR ANEURYSM OF VERTEBROBASILAR OR CAROTID CIRCULATION Left 11/20/2024    Procedure: CRANIOTOMY, FOR ANEURYSM OF VERTEBROBASILAR OR  CAROTID CIRCULATION;  Surgeon: Sejal Simon MD;  Location: [facility] OR 2ND FLR;  Service: Neurosurgery;  Laterality: Left;  with scalp block    DISSECTION, NERVE, USING OPERATING MICROSCOPE Left 11/20/2024    Procedure: DISSECTION, NERVE, USING OPERATING MICROSCOPE;  Surgeon: Sejal Simon MD;  Location: [facility] OR 2ND FLR;  Service: Neurosurgery;  Laterality: Left;  with scalp block    ENDOSCOPIC ULTRASOUND OF UPPER GASTROINTESTINAL TRACT N/A 03/17/2021    Procedure: ULTRASOUND, UPPER GI TRACT, ENDOSCOPIC;  Surgeon: Gerald Anaya MD;  Location: [facility] ENDO (2ND FLR);  Service: Endoscopy;  Laterality: N/A;  Pt unable to get a ride for swab. Will do rapid test at 8:30 - ttr    ENDOSCOPIC ULTRASOUND OF UPPER GASTROINTESTINAL TRACT N/A 01/13/2022    Procedure: ULTRASOUND, UPPER GI TRACT, ENDOSCOPIC;  Surgeon: Kevin Carballo MD;  Location: [facility] ENDO (2ND FLR);  Service: Endoscopy;  Laterality: N/A;  blood thinner approval received, see telephone encounter 1/7/22-BB  rapid  instructions given verbally and sent to address on file in pt's chart-BB    ENDOSCOPIC ULTRASOUND OF UPPER GASTROINTESTINAL TRACT N/A 10/11/2022    Procedure: ULTRASOUND, UPPER GI TRACT, ENDOSCOPIC;  Surgeon: Dequan Ridley MD;  Location: [facility] ENDO (2ND FLR);  Service: Endoscopy;  Laterality: N/A;    ENDOSCOPIC ULTRASOUND OF UPPER GASTROINTESTINAL TRACT N/A 01/24/2024    Procedure: ULTRASOUND, UPPER GI TRACT, ENDOSCOPIC;  Surgeon: Kevin Carballo MD;  Location: [facility] ENDO (2ND FLR);  Service: Endoscopy;  Laterality: N/A;    ENDOSCOPIC ULTRASOUND OF UPPER GASTROINTESTINAL TRACT N/A 03/05/2024    Procedure: ULTRASOUND, UPPER GI TRACT, ENDOSCOPIC;  Surgeon: Kevin Carballo MD;  Location: [facility] ENDO (2ND FLR);  Service: Endoscopy;  Laterality: N/A;  1/25 portal instr-Repeat theEUS at the next available appointment because the preparation was poor. 48hrs of liquid. Ozempic 7 day hold-eliquis approved Dr. Pelaez TE  12/12/2023-tt  2/28-precall complete-pt verbalized udnerstanding of holding Oz    ESOPHAGOGASTRODUODENOSCOPY N/A 02/05/2021    Procedure: EGD (ESOPHAGOGASTRODUODENOSCOPY);  Surgeon: Matthew Hernadez MD;  Location: [facility] ENDO (2ND FLR);  Service: Endoscopy;  Laterality: N/A;  BMI-58    Wt: 361#     COVID test at W on 2/2-GT    ESOPHAGOGASTRODUODENOSCOPY N/A 03/17/2021    Procedure: EGD (ESOPHAGOGASTRODUODENOSCOPY);  Surgeon: Gerald Anaya MD;  Location: [facility] ENDO (2ND FLR);  Service: Endoscopy;  Laterality: N/A;    ESOPHAGOGASTRODUODENOSCOPY N/A 05/25/2021    Procedure: EGD (ESOPHAGOGASTRODUODENOSCOPY);  Surgeon: Kevin Carballo MD;  Location: [facility] ENDO (2ND FLR);  Service: Endoscopy;  Laterality: N/A;  ESD 2 hours  Full COVID vaccination on file. ttr    ESOPHAGOGASTRODUODENOSCOPY N/A 01/13/2022    Procedure: EGD (ESOPHAGOGASTRODUODENOSCOPY);  Surgeon: Kevin Carballo MD;  Location: [facility] ENDO (2ND FLR);  Service: Endoscopy;  Laterality: N/A;  blood thinner approval, see telephone encounter 1/7/22-BB  rapid  instructions given verbally and sent to address on file in pt's chart-BB    ESOPHAGOGASTRODUODENOSCOPY N/A 10/11/2022    Procedure: EGD (ESOPHAGOGASTRODUODENOSCOPY);  Surgeon: Dequan Ridley MD;  Location: [facility] ENDO (2ND FLR);  Service: Endoscopy;  Laterality: N/A;  She is do for EGD/EUS now. Personal history of gastric cancer. Ca Coats #[id number].   Thanks,   Kevin Fuentes MD    Pt is fully vaccinated-DS  9/14/22-Approval to hold Eliquis rec'd from Dr. Pelaez (see telephone encounter 9/14/22)-DS  9/14/22-Ins    ESOPHAGOGASTRODUODENOSCOPY N/A 01/24/2024    Procedure: EGD (ESOPHAGOGASTRODUODENOSCOPY);  Surgeon: Kevin Carballo MD;  Location: [facility] ([address] [place] [address]);  Service: Endoscopy;  Laterality: N/A;  12/8/23: instructions sent via portal. eliquis approval from MD Pelaez in telephone encounter (12/12/23) -GD  1/9-precall complete-MS    ESOPHAGOGASTRODUODENOSCOPY N/A 03/05/2024    Procedure: EGD  (ESOPHAGOGASTRODUODENOSCOPY);  Surgeon: Kevin Carballo MD;  [facility]  Service: Endoscopy;  Laterality: N/A;    EYE SURGERY      HYSTEROSCOPIC POLYPECTOMY OF UTERUS  01/10/2024    Procedure: POLYPECTOMY, UTERUS, HYSTEROSCOPIC;  Surgeon: Pina Pickens MD;  Location: [facility] OR;  Service: OB/GYN;;    HYSTEROSCOPY WITH DILATION AND CURETTAGE OF UTERUS N/A 01/10/2024    Procedure: HYSTEROSCOPY, WITH DILATION AND CURETTAGE OF UTERUS;  Surgeon: Pina Pickens MD;  Location: [facility] OR;  Service: OB/GYN;  Laterality: N/A;    INJECTION OF ANESTHETIC AGENT AROUND NERVE Left 07/10/2018    Procedure: BLOCK, NERVE;  Surgeon: Samantha Vidal MD;  Location: [facility] PAIN MGT;  Service: Pain Management;  Laterality: Left;  Genicular     INJECTION OF ANESTHETIC AGENT AROUND NERVE Left 07/19/2019    Procedure: Block, Nerve NERVE BLOCK GENICULAR WITH PHENOL 6%;  Surgeon: Samantha Vidal MD;  Location: [facility] PAIN MGT;  Service: Pain Management;  Laterality: Left;  NEEDS CONSENT    INSERTION OF TUNNELED CENTRAL VENOUS CATHETER (CVC) WITH SUBCUTANEOUS PORT N/A 07/09/2021    Procedure: INSERTION, PORT-A-CATH;  Surgeon: Mario Paz MD;  Location: [facility] CATH LAB;  Service: Radiology;  Laterality: N/A;  PORT PLACEMENT    RADIOFREQUENCY ABLATION Left 06/19/2020    Procedure: RADIOFREQUENCY ABLATION LEFT GENICULAR;  Surgeon: Tevin Clark MD;  Location: [facility] PAIN MGT;  Service: Pain Management;  Laterality: Left;  Left Genicular RFA    RADIOFREQUENCY ABLATION Left 01/22/2021    Procedure: RADIOFREQUENCY ABLATION LEFT GENICULAR;  Surgeon: Tevin Clark MD;  Location: [facility] PAIN MGT;  Service: Pain Management;  Laterality: Left;    RADIOFREQUENCY ABLATION Left 07/30/2021    Procedure: RADIOFREQUENCY ABLATION, GENICULAR COOLED NEED CONSENT;  Surgeon: Tevin Clark MD;  Location: [facility] PAIN MGT;  Service: Pain Management;  Laterality: Left;    RADIOFREQUENCY ABLATION Left 04/22/2022    Procedure:  RADIOFREQUENCY ABLATION, GENICULAR COOLED , LEFT;  Surgeon: Tevin Clark MD;  Location: [facility] PAIN [facility];  Service: Pain Management;  Laterality: Left;    RADIOFREQUENCY ABLATION Left 12/23/2022    Procedure: RADIOFREQUENCY ABLATION LEFT GENICULAR COOLED CLEARED TO HOLD ELIQUIS 3 DAYS  NEEDS CONSENT;  Surgeon: Tevin Clark MD;  Location: [facility];  Service: Pain Management;  Laterality: Left;    TONSILLECTOMY         Review of patient's allergies indicates:  No Known Allergies    Current Facility-Administered Medications on File Prior to Encounter   Medication    0.9%  NaCl infusion    0.9%  NaCl infusion    sodium chloride 0.9% flush 10 mL     Current Outpatient Medications on File Prior to Encounter   Medication Sig    acetaminophen (TYLENOL) 500 MG tablet Take by mouth daily as needed.    atorvastatin (LIPITOR) 20 MG tablet Take 1 tablet (20 mg total) by mouth once daily.    B-complex with vitamin C (Z-BEC OR EQUIV) tablet Take 1 tablet by mouth once daily.     citalopram (CELEXA) 10 MG tablet Take 1 tablet (10 mg total) by mouth once daily.    furosemide (LASIX) 80 MG tablet Take 1 tablet (80 mg total) by mouth 2 (two) times daily.    gabapentin (NEURONTIN) 300 MG capsule Take 2 capsules (600 mg total) by mouth 3 (three) times daily.    lisinopriL (PRINIVIL,ZESTRIL) 40 MG tablet Take 1 tablet (40 mg total) by mouth once daily.    multivitamin with minerals tablet Take 1 tablet by mouth once daily.     pantoprazole (PROTONIX) 40 MG tablet Take 1 tablet (40 mg total) by mouth once daily.    semaglutide (OZEMPIC) 1 mg/dose (4 mg/3 mL) Inject 1 mg into the skin every 7 days.    CALCIUM CITRATE-VITAMIN D2 ORAL Take 1 tablet by mouth once daily.      Family History       Problem Relation (Age of Onset)    No Known Problems Mother, Father          Tobacco Use    Smoking status: Some Days     Current packs/day: 0.00     Average packs/day: 1 pack/day for 53.9 years (53.7 ttl pk-yrs)     Types: Cigarettes      Start date:      Last attempt to quit: 2023     Years since quittin.0    Smokeless tobacco: Never    Tobacco comments:     currently smoking <5 cigarettes most days   Substance and Sexual Activity    Alcohol use: Yes     Alcohol/week: 1.0 standard drink of alcohol     Types: 1 Glasses of wine per week     Comment: rarely    Drug use: No    Sexual activity: Not Currently     Partners: Male     Review of Systems   Reason unable to perform ROS: See HPI.     Objective:     Vital Signs (Most Recent):  Temp: 97.6 °F (36.4 °C) (24 07)  Pulse: 91 (24 1113)  Resp: 18 (24)  BP: (!) 103/51 (24)  SpO2: 100 % (24) Vital Signs (24h Range):  Temp:  [97.6 °F (36.4 °C)-98.2 °F (36.8 °C)] 97.6 °F (36.4 °C)  Pulse:  [] 91  Resp:  [18] 18  SpO2:  [93 %-100 %] 100 %  BP: (103-128)/(49-60) 103/51     Weight: (!) 147.9 kg (326 lb)  Body mass index is 52.62 kg/m².     Physical Exam  Vitals and nursing note reviewed.   Constitutional:       Appearance: Normal appearance.   Pulmonary:      Effort: Pulmonary effort is normal. No respiratory distress.   Abdominal:      General: There is no distension.   Musculoskeletal:         General: No swelling.      Cervical back: Normal range of motion and neck supple.      Right lower leg: No edema.      Left lower leg: No edema.      Comments: Left sided weakness.    Skin:     General: Skin is warm and dry.      Comments: Appears euvolemic on exam.   Neurological:      General: No focal deficit present.      Mental Status: She is alert.      Motor: Weakness present.   Psychiatric:         Speech: Speech is delayed.         Behavior: Behavior is cooperative.      Comments: Difficulty with articulating thoughts 2/2 aphasia. Responsive on interview.                Significant Labs: All pertinent labs within the past 24 hours have been reviewed.  Recent Lab Results         24  0949   24  1600        Anion Gap 4   6       BUN  41   40       Calcium 10.1   10.2       Chloride 115   113       CO2 19   20       Creatinine 0.9   1.2       eGFR >60.0   48.4       Glucose 97   103       Potassium 5.2   5.9  Comment: *No Visible Hemolysis       Sodium 138   139               Significant Imaging: I have reviewed all pertinent imaging results/findings within the past 24 hours.

## 2024-12-12 NOTE — ASSESSMENT & PLAN NOTE
Hyperkalemia is likely due to YOUNG.The patients most recent potassium results are listed below.  Recent Labs     12/11/24  1600 12/12/24  0949 12/12/24  1045   K 5.9* 5.2* 5.4*     Without EKG changes. Findings not concerning for hyperkalemic emergency. Now s/p 1x dose lokelma.     PLAN:    Monitor for arrhythmias with EKG and/or continuous telemetry.   Treat the hyperkalemia with Potassium Binders.   Monitor potassium: Daily  The patient's hyperkalemia is improving

## 2024-12-12 NOTE — ASSESSMENT & PLAN NOTE
Was Using Nicotine patch as needed when she has the urge to smoking. Has not needed this patch during this admission.    CT findings s/f findings suggestive of  COPD, in context of smoking.    Per chart review 01/18/24 PCP Office Visit:    Quit Smoking > 3 months     I suggested to consider stopping  smoking tobacco for its benefits in the jailene operative period and in the long term.  I Informed about risk of wound healing problem ,infection,lung complications,thrombosis with tobacco use.  Suggested quitting tobacco.  Offered tobacco cessation service.  Not known to have COPD, Bronchitis, Emphysema, wheezing , chronic phlegm.  No inhaler, oxygen use.    PLAN:    Has not required nicotine patch this admission; continue to defer.

## 2024-12-12 NOTE — PLAN OF CARE
Recommendations    Continue regular diet as tolerated. - Add cardiac modifiers when appropriate.    RD to monitor and follow up.    Goals: Meet % EEN/EPN by RD follow up.  Nutrition Goal Status: progressing towards goal  Communication of RD Recs: other (comment) (POC)

## 2024-12-12 NOTE — PROGRESS NOTES
Dario Glover - Neurosurgery ([facility])  [facility] Medicine  Progress Note    Patient Name: Ca Coats  MRN: [id number]  Patient Class: IP- Inpatient   Admission Date: 11/20/2024  Length of Stay: 22 days  Attending Physician: Alisa Friedman MD  Primary Care Provider: Lei Garcia MD        Subjective     Principal Problem:Cerebral aneurysm        HPI:  73F w/ PMHx s/f HTN, LAURA (CPAP qhs), heart failure per chart (10/22/24 Echo w/ EF 60-65% w/ L+R ventricle normal systolic and diastolic function; moderate aortic sclerosis, severely dilated L. atrium, dilated R. atrium, mild tricuspid regurg), obesity, hx gastric cancer s/p chemoradiation, and DVT (Eliquis). Initially referred to neurology for dizziness workup; originally complaining of vertigo multiple times a day for the past several months, associated with shortness of breath, palpitation, nausea, headache, and feeling faint.     Now s/p Left Crani w/ several clips applied for cerebral aneurysm on 11/20/2024. 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 She became less responsive as well as developed a L gaze preference and dense aphasia. She had a perfusion scan with questionable perfusion deficit to the left anterior temporal lobe and milrinone was started with some clinical improvement as per chart review. Now gaze preference resolved, following commands x4 antigravity, able to state name but still remains aphasic.     Hospital medicine consulted for YOUNG (Cr 1.6 -> 1.2 -> 0.9 s/p fluids; baseline 0.8-1.0). On history, she is reluctant to drink fluids. On initial chart review, potassium very elevated, now down-trending (5.3 -> 5.9 -> 5.2).    Overview/Hospital Course:  No notes on file    Past Medical History:   Diagnosis Date    YOUNG (acute kidney injury) 10/15/2021    Anemia     2018    Arthritis     BMI 60.0-69.9, adult      Cataract     Chronic anticoagulation 11/27/2024    DVT in currently on Eliquis     Chronic diastolic congestive heart failure 01/27/2021    Coronary artery disease involving native coronary artery of native heart without angina pectoris 11/15/2024    Deep vein thrombosis     Depression     Dry eye syndrome     DVT of deep femoral vein, right 05/29/2023    Gastric adenocarcinoma 05/25/2021    GERD (gastroesophageal reflux disease)     Glaucoma suspect     History of gastric ulcer     History of psychiatric hospitalization     Hx of psychiatric care     Celexa, no recall of charted Effexor, Lexapro, Desyrel prescriptions    Hyperlipidemia     Hypertension     Hypothyroidism     Morbid obesity     Neuromuscular disorder     Sleep apnea     Thyroid disease        Past Surgical History:   Procedure Laterality Date    APPENDECTOMY      CATARACT EXTRACTION W/  INTRAOCULAR LENS IMPLANT Bilateral     MFIOL OU    CLIP LIGATION OF INTRACRANIAL ANEURYSM BY CRANIOTOMY Left 11/20/2024    Procedure: CRANIOTOMY, WITH ANEURYSM CLIPPING W/ STEALTH;  Surgeon: Sejal Simon MD;  Location: [facility] OR [address] [place] [address];  Service: Neurosurgery;  Laterality: Left;  left pterioral craniotomy with microsurgical clip ligation of mca and anterior chroidal aneurysm, dr. ginna hopkins co surgeon, scalp block, type and cross 2 units, neuromonitoring sep,mep,eeg, regular bed, [place], supine spencer, icu pos    CORONARY ANGIOGRAPHY Bilateral 02/24/2021    Procedure: ANGIOGRAM, CORONARY ARTERY;  Surgeon: Cresencio Ridley MD;  Location: [facility] CATH LAB;  Service: Cardiology;  Laterality: Bilateral;  Covid test needed - ok per Danay SSCU. Pt. w/o transportation or family    CRANIOTOMY, FOR ANEURYSM OF VERTEBROBASILAR OR CAROTID CIRCULATION Left 11/20/2024    Procedure: CRANIOTOMY, FOR ANEURYSM OF VERTEBROBASILAR OR CAROTID CIRCULATION;  Surgeon: Sejal Simon MD;  Location: [facility] OR [address] [place] [address];  Service: Neurosurgery;  Laterality: Left;  with scalp  block    DISSECTION, NERVE, USING OPERATING MICROSCOPE Left 11/20/2024    Procedure: DISSECTION, NERVE, USING OPERATING MICROSCOPE;  Surgeon: Sejal Simon MD;  Location: [facility] OR 2ND FLR;  Service: Neurosurgery;  Laterality: Left;  with scalp block    ENDOSCOPIC ULTRASOUND OF UPPER GASTROINTESTINAL TRACT N/A 03/17/2021    Procedure: ULTRASOUND, UPPER GI TRACT, ENDOSCOPIC;  Surgeon: Gerald Anaya MD;  Location: [facility] (2ND FLR);  Service: Endoscopy;  Laterality: N/A;  Pt unable to get a ride for swab. Will do rapid test at 8:30 - ttr    ENDOSCOPIC ULTRASOUND OF UPPER GASTROINTESTINAL TRACT N/A 01/13/2022    Procedure: ULTRASOUND, UPPER GI TRACT, ENDOSCOPIC;  Surgeon: Kevin Carballo MD;  Location: [facility] (2ND FLR);  Service: Endoscopy;  Laterality: N/A;  blood thinner approval received, see telephone encounter 1/7/22-BB  rapid  instructions given verbally and sent to address on file in pt's chart-BB    ENDOSCOPIC ULTRASOUND OF UPPER GASTROINTESTINAL TRACT N/A 10/11/2022    Procedure: ULTRASOUND, UPPER GI TRACT, ENDOSCOPIC;  Surgeon: Dequan Ridley MD;  Location: [facility] (2ND FLR);  Service: Endoscopy;  Laterality: N/A;    ENDOSCOPIC ULTRASOUND OF UPPER GASTROINTESTINAL TRACT N/A 01/24/2024    Procedure: ULTRASOUND, UPPER GI TRACT, ENDOSCOPIC;  Surgeon: Kevin Carballo MD;  [facility]  Service: Endoscopy;  Laterality: N/A;    ENDOSCOPIC ULTRASOUND OF UPPER GASTROINTESTINAL TRACT N/A 03/05/2024    Procedure: ULTRASOUND, UPPER GI TRACT, ENDOSCOPIC;  Surgeon: Kevin Carballo MD;  [facility]  Service: Endoscopy;  Laterality: N/A;  1/25 portal instr-Repeat theEUS at the next available appointment because the preparation was poor. 48hrs of liquid. Ozempic 7 day hold-eliquis approved Dr. Pelaez TE 12/12/2023-tt  2/28-precall complete-pt verbalized udnerstanding of holding Oz    ESOPHAGOGASTRODUODENOSCOPY N/A 02/05/2021    Procedure: EGD  (ESOPHAGOGASTRODUODENOSCOPY);  Surgeon: Matthew Hernadez MD;  Location: [facility] (2ND FLR);  Service: Endoscopy;  Laterality: N/A;  BMI-58    Wt: 361#     COVID test at [facility] on 2/2-GT    ESOPHAGOGASTRODUODENOSCOPY N/A 03/17/2021    Procedure: EGD (ESOPHAGOGASTRODUODENOSCOPY);  Surgeon: Gerald Anaya MD;  Location: [facility] ENDO (2ND FLR);  Service: Endoscopy;  Laterality: N/A;    ESOPHAGOGASTRODUODENOSCOPY N/A 05/25/2021    Procedure: EGD (ESOPHAGOGASTRODUODENOSCOPY);  Surgeon: Kevin Carballo MD;  Location: [facility] ENDO (2ND FLR);  Service: Endoscopy;  Laterality: N/A;  ESD 2 hours  Full COVID vaccination on file. ttr    ESOPHAGOGASTRODUODENOSCOPY N/A 01/13/2022    Procedure: EGD (ESOPHAGOGASTRODUODENOSCOPY);  Surgeon: Kevin Carballo MD;  Location: [facility] ENDO (2ND FLR);  Service: Endoscopy;  Laterality: N/A;  blood thinner approval, see telephone encounter 1/7/22-BB  rapid  instructions given verbally and sent to address on file in pt's chart-BB    ESOPHAGOGASTRODUODENOSCOPY N/A 10/11/2022    Procedure: EGD (ESOPHAGOGASTRODUODENOSCOPY);  Surgeon: Dequan Ridley MD;  Location: [facility] ENDO (2ND FLR);  Service: Endoscopy;  Laterality: N/A;  She is do for EGD/EUS now. Personal history of gastric cancer. Ca Coats #[id number].   Thanks,   Kevin Fuentes MD    Pt is fully vaccinated-DS  9/14/22-Approval to hold Eliquis rec'd from Dr. Pelaez (see telephone encounter 9/14/22)-DS  9/14/22-Ins    ESOPHAGOGASTRODUODENOSCOPY N/A 01/24/2024    Procedure: EGD (ESOPHAGOGASTRODUODENOSCOPY);  Surgeon: Kevin Carballo MD;  Location: [facility] ENDO (2ND FLR);  Service: Endoscopy;  Laterality: N/A;  12/8/23: instructions sent via portal. eliquis approval from MD Pelaez in telephone encounter (12/12/23) -GD  1/9-precall complete-MS    ESOPHAGOGASTRODUODENOSCOPY N/A 03/05/2024    Procedure: EGD (ESOPHAGOGASTRODUODENOSCOPY);  Surgeon: Kevin Carballo MD;  Location: [facility] ([address] [place] [address]);  Service: Endoscopy;  Laterality: N/A;    EYE  SURGERY      HYSTEROSCOPIC POLYPECTOMY OF UTERUS  01/10/2024    Procedure: POLYPECTOMY, UTERUS, HYSTEROSCOPIC;  Surgeon: Pina Pickens MD;  Location: [facility] OR;  Service: OB/GYN;;    HYSTEROSCOPY WITH DILATION AND CURETTAGE OF UTERUS N/A 01/10/2024    Procedure: HYSTEROSCOPY, WITH DILATION AND CURETTAGE OF UTERUS;  Surgeon: Pina Pickens MD;  Location: [facility] OR;  Service: OB/GYN;  Laterality: N/A;    INJECTION OF ANESTHETIC AGENT AROUND NERVE Left 07/10/2018    Procedure: BLOCK, NERVE;  Surgeon: Samantha Vidal MD;  Location: [facility] PAIN MGT;  Service: Pain Management;  Laterality: Left;  Genicular     INJECTION OF ANESTHETIC AGENT AROUND NERVE Left 07/19/2019    Procedure: Block, Nerve NERVE BLOCK GENICULAR WITH PHENOL 6%;  Surgeon: Samantha Vidal MD;  Location: [facility] PAIN MGT;  Service: Pain Management;  Laterality: Left;  NEEDS CONSENT    INSERTION OF TUNNELED CENTRAL VENOUS CATHETER (CVC) WITH SUBCUTANEOUS PORT N/A 07/09/2021    Procedure: INSERTION, PORT-A-CATH;  Surgeon: Mario Paz MD;  Location: [facility] CATH LAB;  Service: Radiology;  Laterality: N/A;  PORT PLACEMENT    RADIOFREQUENCY ABLATION Left 06/19/2020    Procedure: RADIOFREQUENCY ABLATION LEFT GENICULAR;  Surgeon: Tevin Clark MD;  Location: [facility] PAIN MGT;  Service: Pain Management;  Laterality: Left;  Left Genicular RFA    RADIOFREQUENCY ABLATION Left 01/22/2021    Procedure: RADIOFREQUENCY ABLATION LEFT GENICULAR;  Surgeon: Tevin Clark MD;  Location: [facility] PAIN MGT;  Service: Pain Management;  Laterality: Left;    RADIOFREQUENCY ABLATION Left 07/30/2021    Procedure: RADIOFREQUENCY ABLATION, GENICULAR COOLED NEED CONSENT;  Surgeon: Tevin Clark MD;  Location: [facility] PAIN MGT;  Service: Pain Management;  Laterality: Left;    RADIOFREQUENCY ABLATION Left 04/22/2022    Procedure: RADIOFREQUENCY ABLATION, GENICULAR COOLED , LEFT;  Surgeon: Tevin Clark MD;  Location: [facility] PAIN MGT;  Service: Pain Management;  Laterality: Left;     RADIOFREQUENCY ABLATION Left 2022    Procedure: RADIOFREQUENCY ABLATION LEFT GENICULAR COOLED CLEARED TO HOLD ELIQUIS 3 DAYS  NEEDS CONSENT;  Surgeon: Tevin Clark MD;  Location: [facility];  Service: Pain Management;  Laterality: Left;    TONSILLECTOMY         Review of patient's allergies indicates:  No Known Allergies    Current Facility-Administered Medications on File Prior to Encounter   Medication    0.9%  NaCl infusion    0.9%  NaCl infusion    sodium chloride 0.9% flush 10 mL     Current Outpatient Medications on File Prior to Encounter   Medication Sig    acetaminophen (TYLENOL) 500 MG tablet Take by mouth daily as needed.    atorvastatin (LIPITOR) 20 MG tablet Take 1 tablet (20 mg total) by mouth once daily.    B-complex with vitamin C (Z-BEC OR EQUIV) tablet Take 1 tablet by mouth once daily.     citalopram (CELEXA) 10 MG tablet Take 1 tablet (10 mg total) by mouth once daily.    furosemide (LASIX) 80 MG tablet Take 1 tablet (80 mg total) by mouth 2 (two) times daily.    gabapentin (NEURONTIN) 300 MG capsule Take 2 capsules (600 mg total) by mouth 3 (three) times daily.    lisinopriL (PRINIVIL,ZESTRIL) 40 MG tablet Take 1 tablet (40 mg total) by mouth once daily.    multivitamin with minerals tablet Take 1 tablet by mouth once daily.     pantoprazole (PROTONIX) 40 MG tablet Take 1 tablet (40 mg total) by mouth once daily.    semaglutide (OZEMPIC) 1 mg/dose (4 mg/3 mL) Inject 1 mg into the skin every 7 days.    CALCIUM CITRATE-VITAMIN D2 ORAL Take 1 tablet by mouth once daily.      Family History       Problem Relation (Age of Onset)    No Known Problems Mother, Father          Tobacco Use    Smoking status: Some Days     Current packs/day: 0.00     Average packs/day: 1 pack/day for 53.9 years (53.7 ttl pk-yrs)     Types: Cigarettes     Start date:      Last attempt to quit: 2023     Years since quittin.0    Smokeless tobacco: Never    Tobacco comments:     currently smoking  <5 cigarettes most days   Substance and Sexual Activity    Alcohol use: Yes     Alcohol/week: 1.0 standard drink of alcohol     Types: 1 Glasses of wine per week     Comment: rarely    Drug use: No    Sexual activity: Not Currently     Partners: Male     Review of Systems   Reason unable to perform ROS: See HPI.     Objective:     Vital Signs (Most Recent):  Temp: 97.6 °F (36.4 °C) (12/12/24 0720)  Pulse: 91 (12/12/24 1113)  Resp: 18 (12/12/24 0720)  BP: (!) 103/51 (12/12/24 0720)  SpO2: 100 % (12/12/24 0720) Vital Signs (24h Range):  Temp:  [97.6 °F (36.4 °C)-98.2 °F (36.8 °C)] 97.6 °F (36.4 °C)  Pulse:  [] 91  Resp:  [18] 18  SpO2:  [93 %-100 %] 100 %  BP: (103-128)/(49-60) 103/51     Weight: (!) 147.9 kg (326 lb)  Body mass index is 52.62 kg/m².     Physical Exam  Vitals and nursing note reviewed.   Constitutional:       Appearance: Normal appearance.   Pulmonary:      Effort: Pulmonary effort is normal. No respiratory distress.   Abdominal:      General: There is no distension.   Musculoskeletal:         General: No swelling.      Cervical back: Normal range of motion and neck supple.      Right lower leg: No edema.      Left lower leg: No edema.      Comments: Left sided weakness.    Skin:     General: Skin is warm and dry.      Comments: Appears euvolemic on exam.   Neurological:      General: No focal deficit present.      Mental Status: She is alert.      Motor: Weakness present.   Psychiatric:         Speech: Speech is delayed.         Behavior: Behavior is cooperative.      Comments: Difficulty with articulating thoughts 2/2 aphasia. Responsive on interview.                Significant Labs: All pertinent labs within the past 24 hours have been reviewed.  Recent Lab Results         12/12/24  0949   12/11/24  1600        Anion Gap 4   6       BUN 41   40       Calcium 10.1   10.2       Chloride 115   113       CO2 19   20       Creatinine 0.9   1.2       eGFR >60.0   48.4       Glucose 97   103        Potassium 5.2   5.9  Comment: *No Visible Hemolysis       Sodium 138   139               Significant Imaging: I have reviewed all pertinent imaging results/findings within the past 24 hours.    Assessment and Plan     * Cerebral aneurysm  PLAN:    Management per primary.      YOUNG (acute kidney injury)  YOUNG is likely due to pre-renal azotemia due to intravascular volume depletion. Baseline creatinine is  0.8-1.0 . Most recent creatinine and eGFR are listed below.  Recent Labs     12/11/24  0401 12/11/24  1600 12/12/24  0949   CREATININE 1.6* 1.2 0.9   EGFRNORACEVR 34.3* 48.4* >60.0     PLAN:    YOUNG is resolved  Avoid nephrotoxins and renally dose meds for GFR listed above  Monitor urine output, serial BMP, and adjust therapy as needed  Will monitor daily for changes.    Hyperkalemia  Hyperkalemia is likely due to YOUNG.The patients most recent potassium results are listed below.  Recent Labs     12/11/24  1600 12/12/24  0949 12/12/24  1045   K 5.9* 5.2* 5.4*     Without EKG changes. Findings not concerning for hyperkalemic emergency. Now s/p 1x dose lokelma.     PLAN:    Monitor for arrhythmias with EKG and/or continuous telemetry.   Treat the hyperkalemia with Potassium Binders.   Monitor potassium: Daily  The patient's hyperkalemia is improving            Oral thrush  PLAN:    Continue Velazquez's solution (contains nystatin).      Chronic anticoagulation  Per chart review:    Sept 2021  Thrombosis of the right popliteal, posterior tibial, and peroneal veins.      History of DVT  No PE  1 Episode  Associated with reduced mobility from getting Chemo, Radiation at that time,  No long journeys, cancer,no prior surgery, No Hospital stay, no HRT  Anticoagulated   IVC filter  - none     On Anticoagulation - Apixaban     Provoked DVT     - Cancer   - Reduced Mobility   - Tobacco      On on going Anticoagulation as - she is less mobile      No Afib     PLAN:    Continue holding apixaban in setting of IPH.      Neuropathy  Per  "chart review, 01/18/24:    She has tingling numbness of the hands  She has not a diabetic and does not drink alcohol excessively  She has not known to have cervical spine problems  She has no balance problems, problems with small objects or dropping things  She has not known to have carpal tunnel   She is on gabapentin that is helping    PLAN:    Continue gabapentin.   If YOUNG recurs or worsens, d/c gabapentin as this medication is renally cleared.    Pure hypercholesterolemia  PLAN:    On home Atorvastatin 20mg qD.      Chronic diastolic congestive heart failure  Latest ECHO  Results for orders placed during the hospital encounter of 10/22/24    Echo    Interpretation Summary    Left Ventricle: The left ventricle is normal in size. Normal wall thickness. There is normal systolic function with a visually estimated ejection fraction of 60 - 65%. There is normal diastolic function.    Right Ventricle: Normal right ventricular cavity size. Wall thickness is normal. Systolic function is normal.    Left Atrium: Left atrium is severely dilated.    Right Atrium: Right atrium is dilated.    Aortic Valve: The aortic valve is a trileaflet valve. There is moderate aortic valve sclerosis. Mildly restricted motion.    Tricuspid Valve: There is mild regurgitation.    IVC/SVC: Normal venous pressure at 3 mmHg.    Per cardiology note 11/15/24:    "Currently not having anginal symptoms and had a negative LHC in 2021. Stress echo and SPECT likely will not add information due to body habitus and PET will not be available prior to scheduled urgent surgery. She is probably at a moderate risk for cardiovascular events in a low to intermediate cardiac risk surgery. Recommend proceeding without any further cardiac testing. Discussed risks and benefits with patients and she is in agreement. "     Current Heart Failure Medications  hydrALAZINE injection 10 mg, Every 4 hours PRN, Intravenous  labetalol 20 mg/4 mL (5 mg/mL) IV syring, Every 4 " "hours PRN, Intravenous  hydrALAZINE tablet 50 mg, Every 8 hours, Oral    PLAN:    Latest Echo (10/22/24, as above) does not appear c/f HFpEF; however, chart review is s/f this diagnosis.  Monitor strict I&Os every 4 hours and daily weights.    On telemetry.  The patient's volume status is stable.  Appears euvolemic on exam 12/12/24.    Gait instability  PLAN:    PT/OT on board; management per primary.      Severe obesity (BMI >= 40)  Body mass index is 52.62 kg/m². Morbid obesity complicates all aspects of disease management from diagnostic modalities to treatment. Weight loss encouraged and health benefits explained to patient.         Essential hypertension  Per Primary:    -Keep SBP <160.  -Hydralazine 50mg q8.  -Lisinopril held in setting of YOUNG (previously on 40mg qD).    PLAN:    Agree with plan as above.     Obstructive sleep apnea  On nightly CPAP at home; she is unsure of home settings.     PLAN:    Continue CPAP qhs if patient's functional status is not a barrier to safe use.  Caution with usage of medications that may cause respiratory suppression in the perioperative period.  Caution with patient functional status.    Nervous  Per chart review, 01/18/24 visit:    "Gets nervous  On Celexa  I suggest monitoring the sodium as SIADH from Celexa use and hypersecretion of ADH associated with surgery can reduce sodium in the perioperative period  She seems to be doing fairly well from a mental health standpoint and she has a supportive family"     PLAN:    Continue home citalporam.      Gastric adenocarcinoma  Per chart review:     Patient was diagnosed in 2021 with T1 gastric adenocarcinoma sp ESD. She received 4 cycles of FLOT 7 - 9/2021 but was not felt to be a surgical candidate after chemotherapy due to comorbidities. Patient subsequently treated with chemoradiation 2-3/2022. She follows with Dr. Brown, most recent surveillance EGD without evidence of recurrence. Last CT CAP with Dr. Pelaez " 11/2023.    PLAN:    Management out-pt.    Debility  Recommendations, per chart review:    -  Encourage mobility, OOB in chair, and early ambulation as appropriate  -  PT/OT evaluate and treat  -  Pain management  -  DVT prophylaxis if appropriate   -  Monitor for and prevent skin breakdown and pressure ulcers  Early mobility, repositioning/weight shifting every 20-30 minutes when sitting, turn patient every 2 hours, proper mattress/overlay and chair cushioning, pressure relief/heel protector boots     PLAN:    Management per primary; agree with plan as above.  PT/OT on board.     Tobacco abuse  Was Using Nicotine patch as needed when she has the urge to smoking. Has not needed this patch during this admission.    CT findings s/f findings suggestive of  COPD, in context of smoking.    Per chart review 01/18/24 PCP Office Visit:    Quit Smoking > 3 months     I suggested to consider stopping  smoking tobacco for its benefits in the jailene operative period and in the long term.  I Informed about risk of wound healing problem ,infection,lung complications,thrombosis with tobacco use.  Suggested quitting tobacco.  Offered tobacco cessation service.  Not known to have COPD, Bronchitis, Emphysema, wheezing , chronic phlegm.  No inhaler, oxygen use.    PLAN:    Has not required nicotine patch this admission; continue to defer.    Hypothyroidism  No history of treatment found on chart review.     TSH (01/18/24) - 0.280  Free T4 (01/18/24) - 0.99     Findings as above s/f subclinical hypothyroidism.    PLAN:    Outpatient PCP f/u for monitoring and management.      Hypercoagulable state          VTE Risk Mitigation (From admission, onward)           Ordered     heparin (porcine) injection 7,500 Units  Every 8 hours         11/25/24 0920     IP VTE HIGH RISK PATIENT  Once         11/20/24 2045     Place sequential compression device  Until discontinued         11/20/24 2045                    Discharge Planning   ZAIDA:  12/13/2024     Code Status: Full Code   Medical Readiness for Discharge Date: 12/10/2024  Discharge Plan A: Rehab   Discharge Delays: (!) Post-Acute Set-up            Please place Justification for DME        Josemanuel Lam MD  Department of Hospital Medicine   [facility] - Neurosurgery ([facility])

## 2024-12-12 NOTE — SUBJECTIVE & OBJECTIVE
Interval History: 12/12: Hyperkalemia worsened on PM repeat to 5.9, decreased to 5.2 this AM. Remains on cardiac monitoring. Hospital Medicine consulted for assistance. YOUNG improving. Pt given repeated prompts to drink water, will do so with assistance and observation. Exam stable. Pending peer to peer for rehab.     Medications:  Continuous Infusions:  Scheduled Meds:   atorvastatin  20 mg Oral Daily    citalopram  10 mg Oral Daily    duke's soln (benadryl 30 mL, mylanta 30 mL, LIDOcaine 30 mL, nystatin 30 mL) 120 mL  10 mL Oral QID    gabapentin  600 mg Oral Q8H    heparin (porcine)  7,500 Units Subcutaneous Q8H    hydrALAZINE  50 mg Oral Q8H    levetiracetam  1,000 mg Oral BID     PRN Meds:  Current Facility-Administered Medications:     acetaminophen, 650 mg, Oral, Q4H PRN    bisacodyL, 10 mg, Rectal, Daily PRN    hydrALAZINE, 10 mg, Intravenous, Q4H PRN    labetalol, 10 mg, Intravenous, Q4H PRN    morphine, 2 mg, Intravenous, Q6H PRN    naloxone, 0.4 mg, Intravenous, PRN    ondansetron, 4 mg, Intravenous, Q12H PRN    oxyCODONE, 5 mg, Oral, Q6H PRN    sodium chloride 0.9%, 10 mL, Intravenous, PRN     Objective:     Weight: (!) 147.9 kg (326 lb)  Body mass index is 52.62 kg/m².  Vital Signs (Most Recent):  Temp: 98 °F (36.7 °C) (12/12/24 1136)  Pulse: 87 (12/12/24 1136)  Resp: 18 (12/12/24 1136)  BP: (!) 130/59 (12/12/24 1136)  SpO2: 99 % (12/12/24 1136) Vital Signs (24h Range):  Temp:  [97.6 °F (36.4 °C)-98.2 °F (36.8 °C)] 98 °F (36.7 °C)  Pulse:  [] 87  Resp:  [18] 18  SpO2:  [93 %-100 %] 99 %  BP: (103-130)/(49-60) 130/59                         Female External Urinary Catheter w/ Suction 11/21/24 1400 (Active)   Skin no redness;no breakdown 12/11/24 0715   Tolerance no signs/symptoms of discomfort 12/11/24 0715   Suction Continuous suction at 60 mmHg 12/11/24 0715   Date of last wick change 12/11/24 12/11/24 0715   Time of last wick change 1000 12/08/24 0800   Output (mL) 550 mL 12/11/24 4453     "  Physical Exam  General: well developed, well nourished, no distress.   Head: normocephalic, cranial incision c/d/I with chromic suture  Mental Status: Awake, Alert, Oriented x 2  Speech: Clear with content appropriate to conversation, mild aphasia  Cranial nerves: face symmetric, CN II-XII grossly intact.   Eyes: pupils equal, round, reactive to light, EOMI.  Sensory: intact to light touch throughout  Motor Strength: Moves all extremities spontaneously with good tone. At least antigravity strength in upper and lower extremities. No focal weakness identified. No abnormal movements seen.   Follows commands x 4 extremities  Skin: Skin is warm, dry and intact.       Significant Labs:  Recent Labs   Lab 12/11/24  0401 12/11/24  1600 12/12/24  0949 12/12/24  1045    103 97  --     139 138  --    K 5.3* 5.9* 5.2* 5.4*   * 113* 115*  --    CO2 19* 20* 19*  --    BUN 46* 40* 41*  --    CREATININE 1.6* 1.2 0.9  --    CALCIUM 10.2 10.2 10.1  --    MG 2.3  --   --   --      Recent Labs   Lab 12/11/24  0401   WBC 6.23   HGB 10.1*   HCT 32.6*        No results for input(s): "LABPT", "INR", "APTT" in the last 48 hours.  Microbiology Results (last 7 days)       ** No results found for the last 168 hours. **          All pertinent labs from the last 24 hours have been reviewed.    Significant Diagnostics:  I have reviewed and interpreted all pertinent imaging results/findings within the past 24 hours.  "

## 2024-12-12 NOTE — NURSING
Fellow nurse, GLEN Disla to room to attempt IV site rotation; patient raised a closed hand at her and stated no; refused site rotation.

## 2024-12-13 PROBLEM — N17.9 AKI (ACUTE KIDNEY INJURY): Status: RESOLVED | Noted: 2021-10-15 | Resolved: 2024-12-13

## 2024-12-13 PROBLEM — R45.0 NERVOUS: Status: RESOLVED | Noted: 2024-01-05 | Resolved: 2024-12-13

## 2024-12-13 LAB
ANION GAP SERPL CALC-SCNC: 4 MMOL/L (ref 8–16)
ANION GAP SERPL CALC-SCNC: 4 MMOL/L (ref 8–16)
ANION GAP SERPL CALC-SCNC: 5 MMOL/L (ref 8–16)
ANION GAP SERPL CALC-SCNC: 6 MMOL/L (ref 8–16)
ANION GAP SERPL CALC-SCNC: 7 MMOL/L (ref 8–16)
BASOPHILS # BLD AUTO: 0.03 K/UL (ref 0–0.2)
BASOPHILS # BLD AUTO: 0.03 K/UL (ref 0–0.2)
BASOPHILS NFR BLD: 0.5 % (ref 0–1.9)
BASOPHILS NFR BLD: 0.5 % (ref 0–1.9)
BUN SERPL-MCNC: 37 MG/DL (ref 8–23)
BUN SERPL-MCNC: 38 MG/DL (ref 8–23)
BUN SERPL-MCNC: 40 MG/DL (ref 8–23)
BUN SERPL-MCNC: 41 MG/DL (ref 8–23)
BUN SERPL-MCNC: 42 MG/DL (ref 8–23)
BUN SERPL-MCNC: 43 MG/DL (ref 8–23)
BUN SERPL-MCNC: 45 MG/DL (ref 8–23)
CALCIUM SERPL-MCNC: 10 MG/DL (ref 8.7–10.5)
CALCIUM SERPL-MCNC: 10 MG/DL (ref 8.7–10.5)
CALCIUM SERPL-MCNC: 10.1 MG/DL (ref 8.7–10.5)
CALCIUM SERPL-MCNC: 10.2 MG/DL (ref 8.7–10.5)
CALCIUM SERPL-MCNC: 10.2 MG/DL (ref 8.7–10.5)
CALCIUM SERPL-MCNC: 9.9 MG/DL (ref 8.7–10.5)
CALCIUM SERPL-MCNC: 9.9 MG/DL (ref 8.7–10.5)
CHLORIDE SERPL-SCNC: 114 MMOL/L (ref 95–110)
CHLORIDE SERPL-SCNC: 114 MMOL/L (ref 95–110)
CHLORIDE SERPL-SCNC: 115 MMOL/L (ref 95–110)
CO2 SERPL-SCNC: 18 MMOL/L (ref 23–29)
CO2 SERPL-SCNC: 19 MMOL/L (ref 23–29)
CO2 SERPL-SCNC: 20 MMOL/L (ref 23–29)
CREAT SERPL-MCNC: 0.7 MG/DL (ref 0.5–1.4)
CREAT SERPL-MCNC: 0.8 MG/DL (ref 0.5–1.4)
CREAT SERPL-MCNC: 0.9 MG/DL (ref 0.5–1.4)
CREAT SERPL-MCNC: 1 MG/DL (ref 0.5–1.4)
CREAT SERPL-MCNC: 1 MG/DL (ref 0.5–1.4)
DIFFERENTIAL METHOD BLD: ABNORMAL
DIFFERENTIAL METHOD BLD: ABNORMAL
EOSINOPHIL # BLD AUTO: 0.1 K/UL (ref 0–0.5)
EOSINOPHIL # BLD AUTO: 0.1 K/UL (ref 0–0.5)
EOSINOPHIL NFR BLD: 1.3 % (ref 0–8)
EOSINOPHIL NFR BLD: 1.6 % (ref 0–8)
ERYTHROCYTE [DISTWIDTH] IN BLOOD BY AUTOMATED COUNT: 13.4 % (ref 11.5–14.5)
ERYTHROCYTE [DISTWIDTH] IN BLOOD BY AUTOMATED COUNT: 13.4 % (ref 11.5–14.5)
EST. GFR  (NO RACE VARIABLE): >60 ML/MIN/1.73 M^2
FOLATE SERPL-MCNC: 11.2 NG/ML (ref 4–24)
GLUCOSE SERPL-MCNC: 101 MG/DL (ref 70–110)
GLUCOSE SERPL-MCNC: 101 MG/DL (ref 70–110)
GLUCOSE SERPL-MCNC: 109 MG/DL (ref 70–110)
GLUCOSE SERPL-MCNC: 94 MG/DL (ref 70–110)
GLUCOSE SERPL-MCNC: 95 MG/DL (ref 70–110)
GLUCOSE SERPL-MCNC: 95 MG/DL (ref 70–110)
GLUCOSE SERPL-MCNC: 99 MG/DL (ref 70–110)
HAPTOGLOB SERPL-MCNC: 189 MG/DL (ref 30–250)
HCT VFR BLD AUTO: 29.2 % (ref 37–48.5)
HCT VFR BLD AUTO: 30.3 % (ref 37–48.5)
HGB BLD-MCNC: 9.4 G/DL (ref 12–16)
HGB BLD-MCNC: 9.4 G/DL (ref 12–16)
IMM GRANULOCYTES # BLD AUTO: 0.01 K/UL (ref 0–0.04)
IMM GRANULOCYTES # BLD AUTO: 0.01 K/UL (ref 0–0.04)
IMM GRANULOCYTES NFR BLD AUTO: 0.2 % (ref 0–0.5)
IMM GRANULOCYTES NFR BLD AUTO: 0.2 % (ref 0–0.5)
LYMPHOCYTES # BLD AUTO: 1.6 K/UL (ref 1–4.8)
LYMPHOCYTES # BLD AUTO: 2.2 K/UL (ref 1–4.8)
LYMPHOCYTES NFR BLD: 28.7 % (ref 18–48)
LYMPHOCYTES NFR BLD: 35 % (ref 18–48)
MAGNESIUM SERPL-MCNC: 2 MG/DL (ref 1.6–2.6)
MCH RBC QN AUTO: 32.9 PG (ref 27–31)
MCH RBC QN AUTO: 34.2 PG (ref 27–31)
MCHC RBC AUTO-ENTMCNC: 31 G/DL (ref 32–36)
MCHC RBC AUTO-ENTMCNC: 32.2 G/DL (ref 32–36)
MCV RBC AUTO: 106 FL (ref 82–98)
MCV RBC AUTO: 106 FL (ref 82–98)
MONOCYTES # BLD AUTO: 0.6 K/UL (ref 0.3–1)
MONOCYTES # BLD AUTO: 0.6 K/UL (ref 0.3–1)
MONOCYTES NFR BLD: 10.2 % (ref 4–15)
MONOCYTES NFR BLD: 9.8 % (ref 4–15)
NEUTROPHILS # BLD AUTO: 3.3 K/UL (ref 1.8–7.7)
NEUTROPHILS # BLD AUTO: 3.4 K/UL (ref 1.8–7.7)
NEUTROPHILS NFR BLD: 53.2 % (ref 38–73)
NEUTROPHILS NFR BLD: 58.8 % (ref 38–73)
NRBC BLD-RTO: 0 /100 WBC
NRBC BLD-RTO: 0 /100 WBC
OHS QRS DURATION: 76 MS
OHS QTC CALCULATION: 416 MS
PATH REV BLD -IMP: NORMAL
PATIENT COLLECTION POSITION: NORMAL PG/ML
PHOSPHATE SERPL-MCNC: 3.5 MG/DL (ref 2.7–4.5)
PLATELET # BLD AUTO: 201 K/UL (ref 150–450)
PLATELET # BLD AUTO: 209 K/UL (ref 150–450)
PMV BLD AUTO: 10.6 FL (ref 9.2–12.9)
PMV BLD AUTO: 10.7 FL (ref 9.2–12.9)
POTASSIUM SERPL-SCNC: 5.1 MMOL/L (ref 3.5–5.1)
POTASSIUM SERPL-SCNC: 5.1 MMOL/L (ref 3.5–5.1)
POTASSIUM SERPL-SCNC: 5.2 MMOL/L (ref 3.5–5.1)
POTASSIUM SERPL-SCNC: 5.3 MMOL/L (ref 3.5–5.1)
POTASSIUM SERPL-SCNC: 5.4 MMOL/L (ref 3.5–5.1)
POTASSIUM SERPL-SCNC: 5.4 MMOL/L (ref 3.5–5.1)
POTASSIUM SERPL-SCNC: 5.6 MMOL/L (ref 3.5–5.1)
POTASSIUM SERPL-SCNC: 5.6 MMOL/L (ref 3.5–5.1)
RBC # BLD AUTO: 2.75 M/UL (ref 4–5.4)
RBC # BLD AUTO: 2.86 M/UL (ref 4–5.4)
RENIN PLAS-CCNC: 476.4 PG/ML
SODIUM SERPL-SCNC: 137 MMOL/L (ref 136–145)
SODIUM SERPL-SCNC: 138 MMOL/L (ref 136–145)
SODIUM SERPL-SCNC: 138 MMOL/L (ref 136–145)
SODIUM SERPL-SCNC: 139 MMOL/L (ref 136–145)
SODIUM SERPL-SCNC: 142 MMOL/L (ref 136–145)
VIT B12 SERPL-MCNC: 858 PG/ML (ref 210–950)
WBC # BLD AUTO: 5.68 K/UL (ref 3.9–12.7)
WBC # BLD AUTO: 6.35 K/UL (ref 3.9–12.7)

## 2024-12-13 PROCEDURE — 25000003 PHARM REV CODE 250: Mod: HCNC

## 2024-12-13 PROCEDURE — 25000003 PHARM REV CODE 250: Mod: HCNC | Performed by: PHYSICIAN ASSISTANT

## 2024-12-13 PROCEDURE — 80048 BASIC METABOLIC PNL TOTAL CA: CPT | Mod: HCNC

## 2024-12-13 PROCEDURE — 63600175 PHARM REV CODE 636 W HCPCS: Mod: HCNC

## 2024-12-13 PROCEDURE — 85025 COMPLETE CBC W/AUTO DIFF WBC: CPT | Mod: 91,HCNC

## 2024-12-13 PROCEDURE — 85060 BLOOD SMEAR INTERPRETATION: CPT | Mod: HCNC,,, | Performed by: PATHOLOGY

## 2024-12-13 PROCEDURE — 63600175 PHARM REV CODE 636 W HCPCS: Mod: HCNC | Performed by: NURSE PRACTITIONER

## 2024-12-13 PROCEDURE — 25000003 PHARM REV CODE 250: Mod: HCNC | Performed by: NURSE PRACTITIONER

## 2024-12-13 PROCEDURE — 82088 ASSAY OF ALDOSTERONE: CPT | Mod: 91,HCNC

## 2024-12-13 PROCEDURE — 82746 ASSAY OF FOLIC ACID SERUM: CPT | Mod: HCNC

## 2024-12-13 PROCEDURE — 83010 ASSAY OF HAPTOGLOBIN QUANT: CPT | Mod: HCNC

## 2024-12-13 PROCEDURE — 11000001 HC ACUTE MED/SURG PRIVATE ROOM: Mod: HCNC

## 2024-12-13 PROCEDURE — 82607 VITAMIN B-12: CPT | Mod: HCNC

## 2024-12-13 PROCEDURE — 92507 TX SP LANG VOICE COMM INDIV: CPT | Mod: HCNC

## 2024-12-13 PROCEDURE — 84244 ASSAY OF RENIN: CPT | Mod: 91,HCNC

## 2024-12-13 PROCEDURE — 83735 ASSAY OF MAGNESIUM: CPT | Mod: HCNC | Performed by: PHYSICIAN ASSISTANT

## 2024-12-13 PROCEDURE — 80048 BASIC METABOLIC PNL TOTAL CA: CPT | Mod: 91,HCNC

## 2024-12-13 PROCEDURE — 85025 COMPLETE CBC W/AUTO DIFF WBC: CPT | Mod: HCNC | Performed by: PHYSICIAN ASSISTANT

## 2024-12-13 PROCEDURE — 83921 ORGANIC ACID SINGLE QUANT: CPT | Mod: HCNC

## 2024-12-13 PROCEDURE — 97535 SELF CARE MNGMENT TRAINING: CPT | Mod: HCNC

## 2024-12-13 PROCEDURE — 84100 ASSAY OF PHOSPHORUS: CPT | Mod: HCNC | Performed by: PHYSICIAN ASSISTANT

## 2024-12-13 PROCEDURE — 36415 COLL VENOUS BLD VENIPUNCTURE: CPT | Mod: HCNC

## 2024-12-13 PROCEDURE — 97530 THERAPEUTIC ACTIVITIES: CPT | Mod: HCNC

## 2024-12-13 PROCEDURE — 84244 ASSAY OF RENIN: CPT | Mod: HCNC

## 2024-12-13 PROCEDURE — 25000003 PHARM REV CODE 250: Mod: HCNC | Performed by: STUDENT IN AN ORGANIZED HEALTH CARE EDUCATION/TRAINING PROGRAM

## 2024-12-13 RX ORDER — SYRING-NEEDL,DISP,INSUL,0.3 ML 29 G X1/2"
296 SYRINGE, EMPTY DISPOSABLE MISCELLANEOUS
Status: DISCONTINUED | OUTPATIENT
Start: 2024-12-13 | End: 2024-12-13

## 2024-12-13 RX ORDER — AMOXICILLIN 250 MG
1 CAPSULE ORAL DAILY
Status: DISCONTINUED | OUTPATIENT
Start: 2024-12-13 | End: 2024-12-13

## 2024-12-13 RX ORDER — ENOXAPARIN SODIUM 100 MG/ML
40 INJECTION SUBCUTANEOUS EVERY 24 HOURS
Status: DISCONTINUED | OUTPATIENT
Start: 2024-12-14 | End: 2024-12-13

## 2024-12-13 RX ORDER — SYRING-NEEDL,DISP,INSUL,0.3 ML 29 G X1/2"
296 SYRINGE, EMPTY DISPOSABLE MISCELLANEOUS
Status: DISPENSED | OUTPATIENT
Start: 2024-12-13 | End: 2024-12-14

## 2024-12-13 RX ORDER — PSEUDOEPHEDRINE/ACETAMINOPHEN 30MG-500MG
100 TABLET ORAL
Status: DISCONTINUED | OUTPATIENT
Start: 2024-12-13 | End: 2024-12-13

## 2024-12-13 RX ORDER — ENOXAPARIN SODIUM 100 MG/ML
60 INJECTION SUBCUTANEOUS EVERY 12 HOURS
Status: DISCONTINUED | OUTPATIENT
Start: 2024-12-13 | End: 2024-12-16 | Stop reason: HOSPADM

## 2024-12-13 RX ORDER — POLYETHYLENE GLYCOL 3350 17 G/17G
17 POWDER, FOR SOLUTION ORAL DAILY
Status: DISCONTINUED | OUTPATIENT
Start: 2024-12-13 | End: 2024-12-16 | Stop reason: HOSPADM

## 2024-12-13 RX ORDER — AMOXICILLIN 250 MG
1 CAPSULE ORAL 2 TIMES DAILY
Status: DISCONTINUED | OUTPATIENT
Start: 2024-12-13 | End: 2024-12-16 | Stop reason: HOSPADM

## 2024-12-13 RX ORDER — ENOXAPARIN SODIUM 100 MG/ML
40 INJECTION SUBCUTANEOUS EVERY 12 HOURS
Status: DISCONTINUED | OUTPATIENT
Start: 2024-12-13 | End: 2024-12-13

## 2024-12-13 RX ADMIN — POLYETHYLENE GLYCOL 3350 17 G: 17 POWDER, FOR SOLUTION ORAL at 08:12

## 2024-12-13 RX ADMIN — GABAPENTIN 600 MG: 250 SOLUTION ORAL at 05:12

## 2024-12-13 RX ADMIN — SODIUM CHLORIDE 500 ML: 0.9 INJECTION, SOLUTION INTRAVENOUS at 01:12

## 2024-12-13 RX ADMIN — ALUMINUM HYDROXIDE, MAGNESIUM HYDROXIDE, AND DIMETHICONE 10 ML: 400; 400; 40 SUSPENSION ORAL at 09:12

## 2024-12-13 RX ADMIN — ENOXAPARIN SODIUM 60 MG: 60 INJECTION SUBCUTANEOUS at 08:12

## 2024-12-13 RX ADMIN — LEVETIRACETAM 1000 MG: 500 SOLUTION ORAL at 08:12

## 2024-12-13 RX ADMIN — ALUMINUM HYDROXIDE, MAGNESIUM HYDROXIDE, AND DIMETHICONE 10 ML: 400; 400; 40 SUSPENSION ORAL at 08:12

## 2024-12-13 RX ADMIN — HYDRALAZINE HYDROCHLORIDE 50 MG: 25 TABLET ORAL at 02:12

## 2024-12-13 RX ADMIN — GABAPENTIN 600 MG: 250 SOLUTION ORAL at 02:12

## 2024-12-13 RX ADMIN — SENNOSIDES AND DOCUSATE SODIUM 1 TABLET: 50; 8.6 TABLET ORAL at 08:12

## 2024-12-13 RX ADMIN — ALUMINUM HYDROXIDE, MAGNESIUM HYDROXIDE, AND DIMETHICONE 10 ML: 400; 400; 40 SUSPENSION ORAL at 05:12

## 2024-12-13 RX ADMIN — BISACODYL 10 MG: 10 SUPPOSITORY RECTAL at 08:12

## 2024-12-13 RX ADMIN — ENOXAPARIN SODIUM 40 MG: 100 INJECTION SUBCUTANEOUS at 10:12

## 2024-12-13 RX ADMIN — CITALOPRAM HYDROBROMIDE 10 MG: 10 TABLET ORAL at 08:12

## 2024-12-13 RX ADMIN — ATORVASTATIN CALCIUM 20 MG: 20 TABLET, FILM COATED ORAL at 08:12

## 2024-12-13 RX ADMIN — GABAPENTIN 600 MG: 250 SOLUTION ORAL at 09:12

## 2024-12-13 RX ADMIN — HEPARIN SODIUM 7500 UNITS: 5000 INJECTION INTRAVENOUS; SUBCUTANEOUS at 05:12

## 2024-12-13 RX ADMIN — ALUMINUM HYDROXIDE, MAGNESIUM HYDROXIDE, AND DIMETHICONE 10 ML: 400; 400; 40 SUSPENSION ORAL at 01:12

## 2024-12-13 RX ADMIN — SODIUM ZIRCONIUM CYCLOSILICATE 5 G: 5 POWDER, FOR SUSPENSION ORAL at 08:12

## 2024-12-13 NOTE — PLAN OF CARE
Problem: Adult Inpatient Plan of Care  Goal: Plan of Care Review  Outcome: Progressing  Goal: Patient-Specific Goal (Individualized)  Outcome: Progressing  Goal: Absence of Hospital-Acquired Illness or Injury  Outcome: Progressing  Goal: Optimal Comfort and Wellbeing  Outcome: Progressing  Goal: Readiness for Transition of Care  Outcome: Progressing     Problem: Bariatric Environmental Safety  Goal: Safety Maintained with Care  Outcome: Progressing     Problem: Wound  Goal: Optimal Coping  Outcome: Progressing  Goal: Optimal Functional Ability  Outcome: Progressing  Goal: Absence of Infection Signs and Symptoms  Outcome: Progressing  Goal: Improved Oral Intake  Outcome: Progressing  Goal: Optimal Pain Control and Function  Outcome: Progressing  Goal: Skin Health and Integrity  Outcome: Progressing  Goal: Optimal Wound Healing  Outcome: Progressing     Problem: Infection  Goal: Absence of Infection Signs and Symptoms  Outcome: Progressing     Problem: Stroke, Intracerebral Hemorrhage  Goal: Optimal Coping  Outcome: Progressing  Goal: Effective Bowel Elimination  Outcome: Progressing  Goal: Optimal Cerebral Tissue Perfusion  Outcome: Progressing  Goal: Optimal Cognitive Function  Outcome: Progressing  Goal: Effective Communication Skills  Outcome: Progressing  Goal: Optimal Functional Ability  Outcome: Progressing  Goal: Optimal Nutrition Intake  Outcome: Progressing  Goal: Optimal Pain Control and Function  Outcome: Progressing  Goal: Effective Oxygenation and Ventilation  Outcome: Progressing  Goal: Improved Sensorimotor Function  Outcome: Progressing  Goal: Safe and Effective Swallow  Outcome: Progressing  Goal: Effective Urinary Elimination  Outcome: Progressing     Problem: Fall Injury Risk  Goal: Absence of Fall and Fall-Related Injury  Outcome: Progressing     Problem: Skin Injury Risk Increased  Goal: Skin Health and Integrity  Outcome: Progressing     Problem: Restraint, Nonviolent  Goal: Absence of Harm or  Injury  Outcome: Progressing     Problem: Fatigue  Goal: Improved Activity Tolerance  Outcome: Progressing     Problem: Acute Kidney Injury/Impairment  Goal: Fluid and Electrolyte Balance  Outcome: Progressing  Goal: Improved Oral Intake  Outcome: Progressing  Goal: Effective Renal Function  Outcome: Progressing

## 2024-12-13 NOTE — PT/OT/SLP PROGRESS
"Speech Language Pathology Treatment    Patient Name:  Ca Coats   MRN:  [id number]  Admitting Diagnosis: Cerebral aneurysm    Recommendations:                 General Recommendations:  Dysphagia therapy, Speech/language therapy, and Cognitive-linguistic therapy  Diet recommendations:  Regular Diet - IDDSI Level 7, Liquid Diet Level: Thin liquids - IDDSI Level 0   Aspiration Precautions: Assistance with meals, HOB to 90 degrees, and Strict aspiration precautions   General Precautions: Standard, aspiration, fall  Communication strategies:  provide increased time to answer and go to room if call light pushed    Assessment:     Ca Coats is a 73 y.o. female with an SLP diagnosis of Aphasia, Dysphagia, Dysarthria, and Cognitive-Linguistic Impairment.     Subjective     Spoke with RN prior to entry.     Pt cooperative t/o session though appeared lethargic toward the end of the session.    "I don't know"  "I don't want"    Pain/Comfort:  Pain Rating 1: 0/10  Pain Rating Post-Intervention 1: 0/10    Respiratory Status: Room air    Objective:     Has the patient been evaluated by SLP for swallowing?   Yes  Keep patient NPO? No       Pt seen for ongoing dysphagia and cognitive-linguistic therapy. Pt asleep upon SLP entry into the room though rouse with min verbal cues. Pt with frequent difficulty communicating her basic wants/needs and no spontaneous utterances noted this date. However, she did provide a few 3-word utterances in a low volume in response to the SLP. Verbal perseveration and phonetic paraphasias noted t/o structured tasks. Pt followed simple commands in function given increased response time. She stated her name IND. She required a written binary choice to recall age, birthday, place, and month. She was unable to recall situation or year despite max cues provided. During orientation task, pt stated her age and the year aloud though appeared to point to the written words for place, " month, and birthday. She repeated 2 digits in 1/3 trials given increased response time and repetition of word list. She named common objects found around the room in 1/3 trials given a written word choice. Task abandonment noted. Pt appeared lethargic toward the end of the session. Education provided re: role of SLP, diet recs, swallow precs, s/s aspiration, strategies to improve orientation (use of whiteboard), communication strategies, communication deficits s/p stroke, and POC.  Pt verbalized understanding and agreement though would benefit from ongoing education. Pt remained upright in bed with eyes closed and no report of pain upon SLP exit.       Goals:   Multidisciplinary Problems       SLP Goals          Problem: SLP    Goal Priority Disciplines Outcome   SLP Goal     SLP Progressing   Description: Speech Language Pathology Goals  Goals expected to be met by 12/5:  1) Pt will participate in ongoing assessment of swallow function to determine least restrictive diet.  2) Pt will participate in ongoing speech, language, cognitive evaluation to determine possible goals and poc.   3) Pt will respond to simple yes/no questions with 80% accy given min cues.  4) Pt will complete automatic speech tasks with 90% accy given min cues.  5) Pt will complete naming tasks with 80% accy given mod cues.  6) Pt will follow simple commands with 90% accy given mod cues.                                 Plan:     Patient to be seen:  4 x/week   Plan of Care expires:  12/22/24  Plan of Care reviewed with:  patient   SLP Follow-Up:  Yes       Discharge recommendations:  High Intensity Therapy     Time Tracking:     SLP Treatment Date:   12/13/24  Speech Start Time:  1141  Speech Stop Time:  1159     Speech Total Time (min):  18 min    Billable Minutes: Speech Therapy Individual 10 and Self Care/Home Management Training 8    12/13/2024

## 2024-12-13 NOTE — PROGRESS NOTES
[facility] - Neurosurgery ([facility])  Neurosurgery  Progress Note    Subjective:     History of Present Illness: Isabel Coats is a 73-year-old female past medical history of hypertension, obstructive sleep apnea on CPAP, suggestive heart failure, obesity, history of gastric cancer status post chemoradiation and DVT in currently on Eliquis. She presents to [facility] L pterional crani, 7 clips applied in proximity to large irregular L MCA bifurcation aneurysm and other adjacent small aneurysms, 1 clip applied across neck of aneurysm just proximal to anterior choroidal.  She also has a current smoker she was initially referred to Neurology for workup for dizziness. She had been complaining of vertigo multiple times a day for the past several months, associated with shortness of breath, palpitation, nausea, headache, and feeling faint. She is admitted to [facility] for a higher level of care.     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 Hospital Medicine following for hyperkalemia, 5.3 on most recent repeat. Pt still pending a BM. Had not been receiving the ordered suppositories. Abdomin x-ray grossly WNL. Suppository given this AM. Will continue to monitor. Exam stable. Appeal submitted for auth for IPR.     Medications:  Continuous Infusions:  Scheduled Meds:   atorvastatin  20 mg Oral Daily    citalopram  10 mg Oral Daily    duke's soln (benadryl 30 mL, mylanta 30 mL, LIDOcaine 30 mL, nystatin 30 mL) 120 mL  10 mL Oral QID    enoxparin  40 mg Subcutaneous Q12H (prophylaxis, 0900/2100)    gabapentin  600 mg Oral Q8H    hydrALAZINE  50 mg Oral Q8H    levetiracetam  1,000 mg Oral BID    polyethylene glycol  17 g Oral Daily    senna-docusate 8.6-50 mg  1 tablet Oral Daily     PRN Meds:  Current  Facility-Administered Medications:     acetaminophen, 650 mg, Oral, Q4H PRN    bisacodyL, 10 mg, Rectal, Daily PRN    [COMPLETED] calcium gluconate IVPB, 1 g, Intravenous, Once **AND** calcium gluconate IVPB, 1 g, Intravenous, Q10 Min PRN    hydrALAZINE, 10 mg, Intravenous, Q4H PRN    labetalol, 10 mg, Intravenous, Q4H PRN    morphine, 2 mg, Intravenous, Q6H PRN    naloxone, 0.4 mg, Intravenous, PRN    ondansetron, 4 mg, Intravenous, Q12H PRN    oxyCODONE, 5 mg, Oral, Q6H PRN    sodium chloride 0.9%, 10 mL, Intravenous, PRN     Objective:     Weight: (!) 147.9 kg (326 lb)  Body mass index is 52.62 kg/m².  Vital Signs (Most Recent):  Temp: 98.3 °F (36.8 °C) (12/13/24 0716)  Pulse: 72 (12/13/24 0716)  Resp: 18 (12/13/24 0716)  BP: (!) 118/58 (12/13/24 0716)  SpO2: 96 % (12/13/24 0716) Vital Signs (24h Range):  Temp:  [98 °F (36.7 °C)-98.7 °F (37.1 °C)] 98.3 °F (36.8 °C)  Pulse:  [] 72  Resp:  [16-18] 18  SpO2:  [94 %-99 %] 96 %  BP: ()/(53-59) 118/58                         Female External Urinary Catheter w/ Suction 11/21/24 1400 (Active)   Skin no redness;no breakdown 12/11/24 0715   Tolerance no signs/symptoms of discomfort 12/11/24 0715   Suction Continuous suction at 60 mmHg 12/11/24 0715   Date of last wick change 12/11/24 12/11/24 0715   Time of last wick change 1000 12/08/24 0800   Output (mL) 550 mL 12/11/24 1556     Neurosurgery Physical Exam  General: well developed, well nourished, no distress.   Head: normocephalic, cranial incision c/d/I with chromic suture  Mental Status: Awake, Alert, Oriented x 2  Speech: Clear with content appropriate to conversation, mild aphasia  Cranial nerves: face symmetric, CN II-XII grossly intact.   Eyes: pupils equal, round, reactive to light, EOMI.  Sensory: intact to light touch throughout  Motor Strength: Moves all extremities spontaneously with good tone. At least antigravity strength in upper and lower extremities. No focal weakness identified. No abnormal  "movements seen.   Follows commands x 4 extremities  Skin: Skin is warm, dry and intact.       Significant Labs:  Recent Labs   Lab 12/12/24  2201 12/13/24  0335 12/13/24  0903    99  101  101 94    139  142  138 138   K 5.2*  5.2* 5.2*  5.3*  5.3*  5.2*  5.2* 5.3*  5.3*   * 114*  115*  114* 115*   CO2 19* 19*  20*  19* 19*   BUN 45* 42*  45*  43* 40*   CREATININE 1.1 1.0  0.9  1.0 0.8   CALCIUM 10.3 10.0  10.2  10.0 9.9   MG  --  2.0  --      Recent Labs   Lab 12/12/24  1602 12/13/24  0335 12/13/24  0903   WBC 5.81 6.35 5.68   HGB 9.7* 9.4* 9.4*   HCT 32.1* 29.2* 30.3*    209 201     No results for input(s): "LABPT", "INR", "APTT" in the last 48 hours.  Microbiology Results (last 7 days)       ** No results found for the last 168 hours. **          All pertinent labs from the last 24 hours have been reviewed.    Significant Diagnostics:  I have reviewed and interpreted all pertinent imaging results/findings within the past 24 hours.  Assessment/Plan:     * Cerebral aneurysm  Isabel Coats is a 73-year-old female past medical history of hypertension, obstructive sleep apnea on CPAP, suggestive heart failure, obesity, history of gastric cancer status post chemoradiation and DVT in currently on Eliquis. She presents to [facility] s/p L pterional crani on 11/20 with 7 clips applied in proximity to large irregular L MCA bifurcation aneurysm and other adjacent small aneurysms, 1 clip applied across neck of aneurysm just proximal to anterior choroidal.  After surgery she seemed to be gradually waking up appropriately overnight, then AM of POD1 around shift change had an unexplained decline in exam during which stat CT showed IPH in the right side of her cerebellum. She became less responsive as well as developed a L gaze preference and dense aphasia. She had a perfusion scan with questionable perfusion deficit to the left anterior temporal lobe and milrinone was started with " seemingly some clinical improvement. Now gaze preference resolved, following commands x4 antigravity, able to state name but still remains aphasic.    Imaging:  CTA Head 11/20: expected post-op changes  CTH 11/21 8 AM: right sided cerebellar IPH  CTA 11/21 10 AM: stable IPH, possible frontal contusion  CTP 11/21 4 PM: possible left anterior temporal perfusion deficit in territory of branches distal to MCA clips   MRI brain w wo 11/22: no strokes, post op changes, stable cerebellar bleed. some edema in right frontal lobe.    Plans:  -Admitted to NSGY floor, q4h nc/vs  -SBP <160  -drain removed 11/25  -keppra 1g BID postop. cEEG negative during initial AMS POD1  -Cerebral vasospasm: weaned off milrinone prior to step down  - Daily TCDs completed  - Keep euvolemic.   -PT/OT/OOB  -PM&R consulted  -SLP - tolerating current diet but with poor intake. Continue SLP. Encourage PO fluids since NGT removed 11/28. Diet advanced to regular diet.   -Continue nutritional support with boost supplementation  -Continue to monitor clinically, notify NSGY immediately with any changes in neuro status   -[facility] for DVT ppx    Dispo: pending auth rehab      Hyperkalemia  The patients most recent potassium results are listed below.  Recent Labs     12/12/24  2201 12/13/24  0335 12/13/24  0903   K 5.2*  5.2* 5.2*  5.3*  5.3*  5.2*  5.2* 5.3*  5.3*     Plan  - Monitor for arrhythmias with EKG and/or continuous telemetry.   - Hospital Medicine managing hyperkalemia              Oral thrush  - Dukes solution ordered QID  - Continue oral care     Abscess  Small R inner thigh abscess noted with seropurulent drainage.  Gen Surg consulted 11/29:  This is spontaneously and adequately draining. No significant fluctuance or induration.     -No surgical intervention indicated or recommended at this time as this area is draining well  -CTM. Wound Care following.  -Bactrim course completed    Pure hypercholesterolemia  - home lipitor 20       Chronic  diastolic congestive heart failure    Latest ECHO  Results for orders placed during the hospital encounter of 10/22/24    Echo    Interpretation Summary    Left Ventricle: The left ventricle is normal in size. Normal wall thickness. There is normal systolic function with a visually estimated ejection fraction of 60 - 65%. There is normal diastolic function.    Right Ventricle: Normal right ventricular cavity size. Wall thickness is normal. Systolic function is normal.    Left Atrium: Left atrium is severely dilated.    Right Atrium: Right atrium is dilated.    Aortic Valve: The aortic valve is a trileaflet valve. There is moderate aortic valve sclerosis. Mildly restricted motion.    Tricuspid Valve: There is mild regurgitation.    IVC/SVC: Normal venous pressure at 3 mmHg.    Current Heart Failure Medications  hydrALAZINE injection 10 mg, Every 4 hours PRN, Intravenous  labetalol 20 mg/4 mL (5 mg/mL) IV syring, Every 4 hours PRN, Intravenous  hydrALAZINE tablet 50 mg, Every 8 hours, Oral    Plan  - Monitor strict I&Os every 4 hours and daily weights.    - On telemetry  - The patient's volume status is stable        Natasha Rivera PA-C  Neurosurgery  [facility] - Neurosurgery ([facility])

## 2024-12-13 NOTE — ASSESSMENT & PLAN NOTE
The patients most recent potassium results are listed below.  Recent Labs     12/12/24  2201 12/13/24  0335 12/13/24  0903   K 5.2*  5.2* 5.2*  5.3*  5.3*  5.2*  5.2* 5.3*  5.3*     Plan  - Monitor for arrhythmias with EKG and/or continuous telemetry.   - Hospital Medicine managing hyperkalemia

## 2024-12-13 NOTE — SUBJECTIVE & OBJECTIVE
Interval History: 12/13: AFSS. Hospital Medicine following for hyperkalemia, 5.3 on most recent repeat. Pt still pending a BM. Had not been receiving the ordered suppositories. Abdomin x-ray grossly WNL. Suppository given this AM. Will continue to monitor. Exam stable. Appeal submitted for auth for IPR.     Medications:  Continuous Infusions:  Scheduled Meds:   atorvastatin  20 mg Oral Daily    citalopram  10 mg Oral Daily    duke's soln (benadryl 30 mL, mylanta 30 mL, LIDOcaine 30 mL, nystatin 30 mL) 120 mL  10 mL Oral QID    enoxparin  40 mg Subcutaneous Q12H (prophylaxis, 0900/2100)    gabapentin  600 mg Oral Q8H    hydrALAZINE  50 mg Oral Q8H    levetiracetam  1,000 mg Oral BID    polyethylene glycol  17 g Oral Daily    senna-docusate 8.6-50 mg  1 tablet Oral Daily     PRN Meds:  Current Facility-Administered Medications:     acetaminophen, 650 mg, Oral, Q4H PRN    bisacodyL, 10 mg, Rectal, Daily PRN    [COMPLETED] calcium gluconate IVPB, 1 g, Intravenous, Once **AND** calcium gluconate IVPB, 1 g, Intravenous, Q10 Min PRN    hydrALAZINE, 10 mg, Intravenous, Q4H PRN    labetalol, 10 mg, Intravenous, Q4H PRN    morphine, 2 mg, Intravenous, Q6H PRN    naloxone, 0.4 mg, Intravenous, PRN    ondansetron, 4 mg, Intravenous, Q12H PRN    oxyCODONE, 5 mg, Oral, Q6H PRN    sodium chloride 0.9%, 10 mL, Intravenous, PRN     Objective:     Weight: (!) 147.9 kg (326 lb)  Body mass index is 52.62 kg/m².  Vital Signs (Most Recent):  Temp: 98.3 °F (36.8 °C) (12/13/24 0716)  Pulse: 72 (12/13/24 0716)  Resp: 18 (12/13/24 0716)  BP: (!) 118/58 (12/13/24 0716)  SpO2: 96 % (12/13/24 0716) Vital Signs (24h Range):  Temp:  [98 °F (36.7 °C)-98.7 °F (37.1 °C)] 98.3 °F (36.8 °C)  Pulse:  [] 72  Resp:  [16-18] 18  SpO2:  [94 %-99 %] 96 %  BP: ()/(53-59) 118/58                         Female External Urinary Catheter w/ Suction 11/21/24 1400 (Active)   Skin no redness;no breakdown 12/11/24 0715   Tolerance no signs/symptoms of  "discomfort 12/11/24 0715   Suction Continuous suction at 60 mmHg 12/11/24 0715   Date of last wick change 12/11/24 12/11/24 0715   Time of last wick change 1000 12/08/24 0800   Output (mL) 550 mL 12/11/24 1556     Neurosurgery Physical Exam  General: well developed, well nourished, no distress.   Head: normocephalic, cranial incision c/d/I with chromic suture  Mental Status: Awake, Alert, Oriented x 2  Speech: Clear with content appropriate to conversation, mild aphasia  Cranial nerves: face symmetric, CN II-XII grossly intact.   Eyes: pupils equal, round, reactive to light, EOMI.  Sensory: intact to light touch throughout  Motor Strength: Moves all extremities spontaneously with good tone. At least antigravity strength in upper and lower extremities. No focal weakness identified. No abnormal movements seen.   Follows commands x 4 extremities  Skin: Skin is warm, dry and intact.       Significant Labs:  Recent Labs   Lab 12/12/24  2201 12/13/24  0335 12/13/24  0903    99  101  101 94    139  142  138 138   K 5.2*  5.2* 5.2*  5.3*  5.3*  5.2*  5.2* 5.3*  5.3*   * 114*  115*  114* 115*   CO2 19* 19*  20*  19* 19*   BUN 45* 42*  45*  43* 40*   CREATININE 1.1 1.0  0.9  1.0 0.8   CALCIUM 10.3 10.0  10.2  10.0 9.9   MG  --  2.0  --      Recent Labs   Lab 12/12/24  1602 12/13/24  0335 12/13/24  0903   WBC 5.81 6.35 5.68   HGB 9.7* 9.4* 9.4*   HCT 32.1* 29.2* 30.3*    209 201     No results for input(s): "LABPT", "INR", "APTT" in the last 48 hours.  Microbiology Results (last 7 days)       ** No results found for the last 168 hours. **          All pertinent labs from the last 24 hours have been reviewed.    Significant Diagnostics:  I have reviewed and interpreted all pertinent imaging results/findings within the past 24 hours.  "

## 2024-12-13 NOTE — PLAN OF CARE
[facility] - Neurosurgery (Hospital)  Discharge Reassessment    Primary Care Provider: Lei Garcia MD    Expected Discharge Date: 12/13/2024    Reassessment (most recent)       Discharge Reassessment - 12/13/24 1151          Discharge Reassessment    Assessment Type Discharge Planning Reassessment (P)      Did the patient's condition or plan change since previous assessment? Yes (P)      Discharge Plan discussed with: -- (P)    chart review and MD    Communicated ZAIDA with patient/caregiver Yes (P)      Discharge Plan A Skilled Nursing Facility (P)      Discharge Plan B Home Health (P)      DME Needed Upon Discharge  none (P)      Transition of Care Barriers None (P)         Post-Acute Status    Post-Acute Authorization Placement (P)      Post-Acute Placement Status Referrals Sent (P)      Coverage [employer] MANAGED MEDICARE - [employer]NA [facility] HMO PPO SPECIAL NEEDS - CAPITATED (P)      Discharge Delays Post-Acute Set-up (P)                    P2P denied for IPR, MD to appeal, pt denied to [place] half-way due to lisbet  weight capacity, ABIGAIL [facility]ed [facility] as [facility]. SW will continue to f/u    Discharge Plan A and Plan B have been determined by review of patient's clinical status, future medical and therapeutic needs, and coverage/benefits for post-acute care in coordination with multidisciplinary team members.     Amanda HARRIS,   Case Management   [phone]

## 2024-12-13 NOTE — ASSESSMENT & PLAN NOTE
Isabel Coats is a 73-year-old female past medical history of hypertension, obstructive sleep apnea on CPAP, suggestive heart failure, obesity, history of gastric cancer status post chemoradiation and DVT in currently on Eliquis. She presents to [facility] s/p L pterional crani on 11/20 with 7 clips applied in proximity to large irregular L MCA bifurcation aneurysm and other adjacent small aneurysms, 1 clip applied across neck of aneurysm just proximal to anterior choroidal.  After surgery she seemed to be gradually waking up appropriately overnight, then AM of POD1 around shift change had an unexplained decline in exam during which stat CT showed IPH in the right side of her cerebellum. She became less responsive as well as developed a L gaze preference and dense aphasia. She had a perfusion scan with questionable perfusion deficit to the left anterior temporal lobe and milrinone was started with seemingly some clinical improvement. Now gaze preference resolved, following commands x4 antigravity, able to state name but still remains aphasic.    Imaging:  CTA Head 11/20: expected post-op changes  CTH 11/21 8 AM: right sided cerebellar IPH  CTA 11/21 10 AM: stable IPH, possible frontal contusion  CTP 11/21 4 PM: possible left anterior temporal perfusion deficit in territory of branches distal to MCA clips   MRI brain w wo 11/22: no strokes, post op changes, stable cerebellar bleed. some edema in right frontal lobe.    Plans:  -Admitted to NSGY floor, q4h nc/vs  -SBP <160  -drain removed 11/25  -keppra 1g BID postop. cEEG negative during initial AMS POD1  -Cerebral vasospasm: weaned off milrinone prior to step down  - Daily TCDs completed  - Keep euvolemic.   -PT/OT/OOB  -PM&R consulted  -SLP - tolerating current diet but with poor intake. Continue SLP. Encourage PO fluids since NGT removed 11/28. Diet advanced to regular diet.   -Continue nutritional support with boost supplementation  -Continue to monitor clinically,  notify NSGY immediately with any changes in neuro status   -[facility] for DVT ppx    Dispo: pending auth rehab

## 2024-12-13 NOTE — PLAN OF CARE
12/13/24 1143   Post-Acute Status   Post-Acute Authorization Placement   Post-Acute Placement Status Referrals Sent   Coverage [employer] MANAGED MEDICARE - [employer]NA [facility] HMO PPO SPECIAL NEEDS - CAPITATED   Discharge Delays (!) Post-Acute Set-up   Discharge Plan   Discharge Plan A Skilled Nursing Facility   Discharge Plan B Home Health     ABIGAIL received notice from MD regarding P2P denial, MD will appeal however, ABIGAIL contacted Jeff schwarz for status of ref faxed, unable to accept pt due to lisbet  weight capacity. ABIGAIL contacted [facility], faxed updated notes as requested. ABIGAIL will continue to follow up .     Amanda HARRIS,   Case Management   [phone]

## 2024-12-13 NOTE — PT/OT/SLP PROGRESS
Occupational Therapy   Treatment    Name: Ca Coats  MRN: [id number]  Admitting Diagnosis:  Cerebral aneurysm  23 Days Post-Op    Recommendations:     Discharge Recommendations: Moderate Intensity Therapy  Discharge Equipment Recommendations:  hospital bed, lift device, wheelchair  Barriers to discharge:  None    Assessment:     Ca Coats is a 73 y.o. female with a medical diagnosis of Cerebral aneurysm.  She presents with decrease functional status secondary to medical diagnosis. Performance deficits affecting function are weakness, impaired endurance, impaired self care skills, impaired functional mobility, gait instability, impaired balance, impaired cognition, decreased lower extremity function, decreased safety awareness. Patient with fair tolerance to OT session limited by AMS, patient not oriented at this time frequently attempting to return to supine from EOB. Patient requiring redirection to task throughout session presenting with limited attention.  Patient becoming emotionally liable over current health status, OT provided therapeutic listening techniques. Patient requiring max x2 assistance for all mobility at this time but able to sit EOB with minimal-standby assistance. Patient verbalizing pain in LLE when transferring from supine to sit, pain identified as tightness and PROM applied for muscular tightness relief. Patient DC recommendation updated to moderate 2/2 patient activity tolerance, current functional state, and participation in session.     Rehab Prognosis:  Good; patient would benefit from acute skilled OT services to address these deficits and reach maximum level of function.       Plan:     Patient to be seen 4 x/week to address the above listed problems via self-care/home management, therapeutic exercises, therapeutic activities, neuromuscular re-education  Plan of Care Expires: 01/24/25  Plan of Care Reviewed with: patient    Subjective     Chief Complaint: none    Patient/Family Comments/goals: DC   Pain/Comfort:  Location - Side 1: Left  Location 1: knee  Pain Addressed 1: Reposition, Distraction    Objective:     Communicated with: RN prior to session.  Patient found supine with telemetry, bed alarm, SCD, PureWick upon OT entry to room.    General Precautions: Standard, aphasia, fall    Orthopedic Precautions:N/A  Braces: N/A  Respiratory Status: Room air     Occupational Performance:     Bed Mobility:    Patient completed Rolling/Turning to Left with  maximal assistance  Patient completed Rolling/Turning to Right with maximal assistance  Patient completed Scooting/Bridging with maximal assistance  Patient completed Supine to Sit with maximal assistance and 2 persons  Patient completed Sit to Supine with maximal assistance and 2 persons   Patient sat EOB with minimal-standby assistance patient needing increased assistance for postural stability and trunk support. While sitting EOB patient completed the following:   Auditory/visual/tactile stimulation; patient re-orientation performed   Multi-directional reaching; with focus on postural stability and upright tolerance   AROM BUE at all joints     Functional Mobility/Transfers:  Patient deferring at this time     Activities of Daily Living:  Grooming: stand by assistance / set up to complete facial care EOB      [facility] 6 Click ADL: 6    Treatment & Education:  Provided education regarding role of OT, POC, & discharge recommendations.  Pt with no further questions/concerns at this time. OT provided education on home recommendations and fall prevention in preparation for D/C.     Patient left supine with all lines intact    GOALS:   Multidisciplinary Problems       Occupational Therapy Goals          Problem: Occupational Therapy    Goal Priority Disciplines Outcome Interventions   Occupational Therapy Goal     OT, PT/OT Progressing    Description: Goals to be met by: 12/24/24     Patient will increase functional independence  with ADLs by performing:    UE Dressing with Stand-by Assistance.  LE Dressing with Minimal Assistance.  Grooming while seated with Stand-by Assistance.  Toileting from toilet/bedside commode with Minimal Assistance for hygiene and clothing management.   Supine to sit with Minimal Assistance.  Toilet transfer to toilet/bedside commode with Minimal Assistance.  Eating with Modified independence    Patient requires a hospital bed for positioning of the body in ways that are not feasible with an ordinary bed. The patient requires special positioning for pain relief, limited mobility, and/or being unable to independently make changes in body position without the use of a hospital bed. Pillows and wedges will not be adequate for resolving these positional issues.      Patient has a mobility limitation that significantly impairs their ability to participate in one or more mobility related activities of daily living in customary locations in the home. The mobility limitation cannot be sufficiently resolved by the use of a cane or walker. The use of a manual wheelchair will greatly improve the patient's ability to participate in MRADLs. The patient will use the wheelchair on a regular basis at home. They have expressed their willingness to use a manual wheelchair in the home, and have a caregiver who is available and willing to assist with the wheelchair if needed.                            Time Tracking:     OT Date of Treatment: 12/13/24  OT Start Time: 1406  OT Stop Time: 1429  OT Total Time (min): 23 min    Billable Minutes:Therapeutic Activity 23    OT/RICHARD: OT     Number of RICHARD visits since last OT visit: 0    12/13/2024

## 2024-12-14 LAB
ANION GAP SERPL CALC-SCNC: 3 MMOL/L (ref 8–16)
ANION GAP SERPL CALC-SCNC: 5 MMOL/L (ref 8–16)
ANION GAP SERPL CALC-SCNC: 7 MMOL/L (ref 8–16)
ANION GAP SERPL CALC-SCNC: 7 MMOL/L (ref 8–16)
BUN SERPL-MCNC: 30 MG/DL (ref 8–23)
BUN SERPL-MCNC: 32 MG/DL (ref 8–23)
BUN SERPL-MCNC: 33 MG/DL (ref 8–23)
BUN SERPL-MCNC: 35 MG/DL (ref 8–23)
CALCIUM SERPL-MCNC: 10 MG/DL (ref 8.7–10.5)
CALCIUM SERPL-MCNC: 10 MG/DL (ref 8.7–10.5)
CALCIUM SERPL-MCNC: 10.3 MG/DL (ref 8.7–10.5)
CALCIUM SERPL-MCNC: 10.4 MG/DL (ref 8.7–10.5)
CHLORIDE SERPL-SCNC: 113 MMOL/L (ref 95–110)
CHLORIDE SERPL-SCNC: 115 MMOL/L (ref 95–110)
CHLORIDE SERPL-SCNC: 115 MMOL/L (ref 95–110)
CHLORIDE SERPL-SCNC: 116 MMOL/L (ref 95–110)
CO2 SERPL-SCNC: 18 MMOL/L (ref 23–29)
CO2 SERPL-SCNC: 18 MMOL/L (ref 23–29)
CO2 SERPL-SCNC: 19 MMOL/L (ref 23–29)
CO2 SERPL-SCNC: 19 MMOL/L (ref 23–29)
CORTIS SERPL-MCNC: 14.9 UG/DL (ref 4.3–22.4)
CREAT SERPL-MCNC: 0.7 MG/DL (ref 0.5–1.4)
CREAT SERPL-MCNC: 0.8 MG/DL (ref 0.5–1.4)
EST. GFR  (NO RACE VARIABLE): >60 ML/MIN/1.73 M^2
GLUCOSE SERPL-MCNC: 103 MG/DL (ref 70–110)
GLUCOSE SERPL-MCNC: 111 MG/DL (ref 70–110)
GLUCOSE SERPL-MCNC: 89 MG/DL (ref 70–110)
GLUCOSE SERPL-MCNC: 97 MG/DL (ref 70–110)
POTASSIUM SERPL-SCNC: 4.7 MMOL/L (ref 3.5–5.1)
POTASSIUM SERPL-SCNC: 4.9 MMOL/L (ref 3.5–5.1)
POTASSIUM SERPL-SCNC: 5.1 MMOL/L (ref 3.5–5.1)
POTASSIUM SERPL-SCNC: 5.1 MMOL/L (ref 3.5–5.1)
SODIUM SERPL-SCNC: 137 MMOL/L (ref 136–145)
SODIUM SERPL-SCNC: 139 MMOL/L (ref 136–145)
SODIUM SERPL-SCNC: 139 MMOL/L (ref 136–145)
SODIUM SERPL-SCNC: 140 MMOL/L (ref 136–145)

## 2024-12-14 PROCEDURE — 11000001 HC ACUTE MED/SURG PRIVATE ROOM: Mod: HCNC

## 2024-12-14 PROCEDURE — 80048 BASIC METABOLIC PNL TOTAL CA: CPT | Mod: 91,HCNC

## 2024-12-14 PROCEDURE — 97112 NEUROMUSCULAR REEDUCATION: CPT | Mod: HCNC,CQ

## 2024-12-14 PROCEDURE — 82533 TOTAL CORTISOL: CPT | Mod: HCNC

## 2024-12-14 PROCEDURE — 25000003 PHARM REV CODE 250: Mod: HCNC | Performed by: PHYSICIAN ASSISTANT

## 2024-12-14 PROCEDURE — 63600175 PHARM REV CODE 636 W HCPCS: Mod: HCNC

## 2024-12-14 PROCEDURE — 25000003 PHARM REV CODE 250: Mod: HCNC | Performed by: STUDENT IN AN ORGANIZED HEALTH CARE EDUCATION/TRAINING PROGRAM

## 2024-12-14 PROCEDURE — 36415 COLL VENOUS BLD VENIPUNCTURE: CPT | Mod: HCNC,XB

## 2024-12-14 PROCEDURE — 80048 BASIC METABOLIC PNL TOTAL CA: CPT | Mod: HCNC

## 2024-12-14 PROCEDURE — 99024 POSTOP FOLLOW-UP VISIT: CPT | Mod: HCNC,,, | Performed by: PHYSICIAN ASSISTANT

## 2024-12-14 PROCEDURE — 25000003 PHARM REV CODE 250: Mod: HCNC

## 2024-12-14 PROCEDURE — 97530 THERAPEUTIC ACTIVITIES: CPT | Mod: HCNC,CQ

## 2024-12-14 RX ADMIN — SENNOSIDES AND DOCUSATE SODIUM 1 TABLET: 50; 8.6 TABLET ORAL at 09:12

## 2024-12-14 RX ADMIN — LEVETIRACETAM 1000 MG: 500 SOLUTION ORAL at 09:12

## 2024-12-14 RX ADMIN — CITALOPRAM HYDROBROMIDE 10 MG: 10 TABLET ORAL at 09:12

## 2024-12-14 RX ADMIN — ATORVASTATIN CALCIUM 20 MG: 20 TABLET, FILM COATED ORAL at 09:12

## 2024-12-14 RX ADMIN — LEVETIRACETAM 1000 MG: 500 SOLUTION ORAL at 10:12

## 2024-12-14 RX ADMIN — ACETAMINOPHEN 650 MG: 325 TABLET ORAL at 10:12

## 2024-12-14 RX ADMIN — GABAPENTIN 600 MG: 250 SOLUTION ORAL at 09:12

## 2024-12-14 RX ADMIN — ENOXAPARIN SODIUM 60 MG: 60 INJECTION SUBCUTANEOUS at 09:12

## 2024-12-14 RX ADMIN — ALUMINUM HYDROXIDE, MAGNESIUM HYDROXIDE, AND DIMETHICONE 10 ML: 400; 400; 40 SUSPENSION ORAL at 09:12

## 2024-12-14 RX ADMIN — HYDRALAZINE HYDROCHLORIDE 50 MG: 25 TABLET ORAL at 09:12

## 2024-12-14 RX ADMIN — ALUMINUM HYDROXIDE, MAGNESIUM HYDROXIDE, AND DIMETHICONE 10 ML: 400; 400; 40 SUSPENSION ORAL at 01:12

## 2024-12-14 RX ADMIN — GABAPENTIN 600 MG: 250 SOLUTION ORAL at 02:12

## 2024-12-14 RX ADMIN — ALUMINUM HYDROXIDE, MAGNESIUM HYDROXIDE, AND DIMETHICONE 10 ML: 400; 400; 40 SUSPENSION ORAL at 05:12

## 2024-12-14 RX ADMIN — HYDRALAZINE HYDROCHLORIDE 50 MG: 25 TABLET ORAL at 02:12

## 2024-12-14 RX ADMIN — GABAPENTIN 600 MG: 250 SOLUTION ORAL at 05:12

## 2024-12-14 RX ADMIN — HYDRALAZINE HYDROCHLORIDE 50 MG: 25 TABLET ORAL at 05:12

## 2024-12-14 RX ADMIN — POLYETHYLENE GLYCOL 3350 17 G: 17 POWDER, FOR SOLUTION ORAL at 09:12

## 2024-12-14 NOTE — PROGRESS NOTES
[facility] - Neurosurgery ([facility])  [facility] Medicine  Progress Note    Patient Name: Ca Coats  MRN: [id number]  Patient Class: IP- Inpatient   Admission Date: 11/20/2024  Length of Stay: 23 days  Attending Physician: Kaushal Dasilva DO  Primary Care Provider: Lei Garcia MD        Subjective     Principal Problem:Cerebral aneurysm        HPI:  73F w/ PMHx s/f HTN, LAURA (CPAP qhs), heart failure per chart (10/22/24 Echo w/ EF 60-65% w/ L+R ventricle normal systolic and diastolic function; moderate aortic sclerosis, severely dilated L. atrium, dilated R. atrium, mild tricuspid regurg), obesity, hx gastric cancer s/p chemoradiation, and DVT (Eliquis). Initially referred to neurology for dizziness workup; originally complaining of vertigo multiple times a day for the past several months, associated with shortness of breath, palpitation, nausea, headache, and feeling faint.     Now s/p Left Crani w/ several clips applied for cerebral aneurysm on 11/20/2024. 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 She became less responsive as well as developed a L gaze preference and dense aphasia. She had a perfusion scan with questionable perfusion deficit to the left anterior temporal lobe and milrinone was started with some clinical improvement as per chart review. Now gaze preference resolved, following commands x4 antigravity, able to state name but still remains aphasic.     Hospital medicine consulted for YOUNG (Cr 1.6 -> 1.2 -> 0.9 s/p fluids; baseline 0.8-1.0). On history, she is reluctant to drink fluids. On initial chart review, potassium very elevated, now down-trending (5.3 -> 5.9 -> 5.2).    Overview/Hospital Course:  No notes on file    Interval History: NAEO, VSS. K remains high; however, had 1x bowel movement and K now trending down. Low aldosterone activity labs (-),  cortisol pending. Will continue to monitor.     Review of Systems   Reason unable to perform ROS: See HPI.     Objective:     Vital Signs (Most Recent):  Temp: 98.1 °F (36.7 °C) (12/13/24 1513)  Pulse: 84 (12/13/24 1513)  Resp: 18 (12/13/24 1513)  BP: 118/60 (12/13/24 1513)  SpO2: 98 % (12/13/24 1513) Vital Signs (24h Range):  Temp:  [98.1 °F (36.7 °C)-98.4 °F (36.9 °C)] 98.1 °F (36.7 °C)  Pulse:  [] 84  Resp:  [16-18] 18  SpO2:  [94 %-99 %] 98 %  BP: ()/(53-60) 118/60     Weight: (!) 147.9 kg (326 lb)  Body mass index is 52.62 kg/m².    Intake/Output Summary (Last 24 hours) at 12/13/2024 1938  Last data filed at 12/13/2024 0442  Gross per 24 hour   Intake --   Output 400 ml   Net -400 ml         Physical Exam  Vitals and nursing note reviewed.   Constitutional:       Appearance: Normal appearance.   Pulmonary:      Effort: Pulmonary effort is normal. No respiratory distress.   Abdominal:      General: There is no distension.   Musculoskeletal:         General: No swelling.      Cervical back: Normal range of motion and neck supple.      Right lower leg: No edema.      Left lower leg: No edema.      Comments: Left sided weakness.    Skin:     General: Skin is warm and dry.      Comments: Appears euvolemic on exam.   Neurological:      General: No focal deficit present.      Mental Status: She is alert.      Motor: Weakness present.   Psychiatric:         Speech: Speech is delayed.         Behavior: Behavior is cooperative.      Comments: Difficulty with articulating thoughts 2/2 aphasia. Responsive on interview.             Significant Labs: All pertinent labs within the past 24 hours have been reviewed.  Recent Lab Results  (Last 5 results in the past 24 hours)        12/13/24  1632   12/13/24  1155   12/13/24  0903   12/13/24  0335   12/12/24  2202        Anion Gap 5   5   4   6                7                5         Baso #     0.03   0.03         Basophil %     0.5   0.5         BUN 38   41   40    42                45                43         Calcium 10.1   10.2   9.9   10.0                10.2                10.0         Chloride 115   115   115   114                115                114         CO2 19   19   19   19                20                19         Creatinine 0.8   0.8   0.8   1.0                0.9                1.0         Differential Method     Automated   Automated         eGFR >60.0   >60.0   >60.0   >60.0                >60.0                >60.0         Eos #     0.1   0.1         Eos %     1.6   1.3         Folate       11.2         Glucose 95   109   94   99                101                101         Gran # (ANC)     3.3   3.4         Gran %     58.8   53.2         Haptoglobin     189           Hematocrit     30.3   29.2         Hemoglobin     9.4   9.4         Immature Grans (Abs)     0.01  Comment: Mild elevation in immature granulocytes is non specific and   can be seen in a variety of conditions including stress response,   acute inflammation, trauma and pregnancy. Correlation with other   laboratory and clinical findings is essential.     0.01  Comment: Mild elevation in immature granulocytes is non specific and   can be seen in a variety of conditions including stress response,   acute inflammation, trauma and pregnancy. Correlation with other   laboratory and clinical findings is essential.           Immature Granulocytes     0.2   0.2         Lymph #     1.6   2.2         Lymph %     28.7   35.0         Magnesium        2.0         MCH     32.9   34.2         MCHC     31.0   32.2         MCV     106   106         Mono #     0.6   0.6         Mono %     10.2   9.8         MPV     10.6   10.7         nRBC     0   0         Pathologist Review     Review required           Patient Collection Position     Upright/Seated  Comment: Renin Normal Range:    Upright <= 40 yrs 4.2-52.2  >40 yrs 3.6-81.6  Supine  <= 40 yrs 3.2-33.2  >40 yrs 2.5-45.1              Phosphorus Level        3.5         Platelet Count     201   209         POCT Glucose         116       Potassium 5.4  Comment: *No Visible Hemolysis   5.6  Comment: *No Visible Hemolysis   5.3  Comment: *No Visible Hemolysis   5.2          5.4  Comment: *No Visible Hemolysis   5.6  Comment: *No Visible Hemolysis   5.3  Comment: *No Visible Hemolysis   5.3  Comment: *No Visible Hemolysis                5.3  Comment: *No Visible Hemolysis                5.2                5.2         RBC     2.86   2.75         RDW     13.4   13.4         Renin     476.40  Comment: Renin Normal Range:    Upright <= 40 yrs 4.2-52.2  >40 yrs 3.6-81.6  Supine  <= 40 yrs 3.2-33.2  >40 yrs 2.5-45.1              Sodium 139   139   138   139                142                138         Vitamin B12       858         WBC     5.68   6.35                                Significant Imaging: I have reviewed all pertinent imaging results/findings within the past 24 hours.    Assessment and Plan     * Cerebral aneurysm  PLAN:    Management per primary.      Hyperkalemia  Hyperkalemia is likely due to YOUNG.The patients most recent potassium results are listed below.  Recent Labs     12/13/24  0903 12/13/24  1155 12/13/24  1632   K 5.3*  5.3* 5.6*  5.6* 5.4*  5.4*       Without EKG changes. Findings not concerning for hyperkalemic emergency. Now s/p 1x dose lokelma.     PLAN:    Monitor for arrhythmias with EKG and/or continuous telemetry.   Treat the hyperkalemia with Potassium Binders.   Monitor potassium: Daily  The patient's hyperkalemia is improving  Had a BM this PM. K now downtrending.  Gave 1x additional lokelma.            Oral thrush  PLAN:    Continue Velazquez's solution (contains nystatin).      Chronic anticoagulation  Per chart review:    Sept 2021  Thrombosis of the right popliteal, posterior tibial, and peroneal veins.      History of DVT  No PE  1 Episode  Associated with reduced mobility from getting Chemo, Radiation at that time,  No long journeys,  "cancer,no prior surgery, No Hospital stay, no HRT  Anticoagulated   IVC filter  - none     On Anticoagulation - Apixaban     Provoked DVT     - Cancer   - Reduced Mobility   - Tobacco      On on going Anticoagulation as - she is less mobile      No Afib     PLAN:    Continue holding apixaban in setting of IPH.      Neuropathy  Per chart review, 01/18/24:    She has tingling numbness of the hands  She has not a diabetic and does not drink alcohol excessively  She has not known to have cervical spine problems  She has no balance problems, problems with small objects or dropping things  She has not known to have carpal tunnel   She is on gabapentin that is helping    PLAN:    Continue gabapentin.   If YOUNG recurs or worsens, d/c gabapentin as this medication is renally cleared.    Pure hypercholesterolemia  PLAN:    On home Atorvastatin 20mg qD.      Chronic diastolic congestive heart failure  Latest ECHO  Results for orders placed during the hospital encounter of 10/22/24    Echo    Interpretation Summary    Left Ventricle: The left ventricle is normal in size. Normal wall thickness. There is normal systolic function with a visually estimated ejection fraction of 60 - 65%. There is normal diastolic function.    Right Ventricle: Normal right ventricular cavity size. Wall thickness is normal. Systolic function is normal.    Left Atrium: Left atrium is severely dilated.    Right Atrium: Right atrium is dilated.    Aortic Valve: The aortic valve is a trileaflet valve. There is moderate aortic valve sclerosis. Mildly restricted motion.    Tricuspid Valve: There is mild regurgitation.    IVC/SVC: Normal venous pressure at 3 mmHg.    Per cardiology note 11/15/24:    "Currently not having anginal symptoms and had a negative LHC in 2021. Stress echo and SPECT likely will not add information due to body habitus and PET will not be available prior to scheduled urgent surgery. She is probably at a moderate risk for cardiovascular " "events in a low to intermediate cardiac risk surgery. Recommend proceeding without any further cardiac testing. Discussed risks and benefits with patients and she is in agreement. "     Current Heart Failure Medications  hydrALAZINE injection 10 mg, Every 4 hours PRN, Intravenous  labetalol 20 mg/4 mL (5 mg/mL) IV syring, Every 4 hours PRN, Intravenous  hydrALAZINE tablet 50 mg, Every 8 hours, Oral    PLAN:    Latest Echo (10/22/24, as above) does not appear c/f HFpEF; however, chart review is s/f this diagnosis.  Monitor strict I&Os every 4 hours and daily weights.    On telemetry.  The patient's volume status is stable.  Appears euvolemic on exam 12/12/24.    Gait instability  PLAN:    PT/OT on board; management per primary.      Severe obesity (BMI >= 40)  Body mass index is 52.62 kg/m². Morbid obesity complicates all aspects of disease management from diagnostic modalities to treatment. Weight loss encouraged and health benefits explained to patient.         Essential hypertension  Per Primary:    -Keep SBP <160.  -Hydralazine 50mg q8.  -Lisinopril held in setting of YOUNG (previously on 40mg qD).    PLAN:    Agree with plan as above.     Obstructive sleep apnea  On nightly CPAP at home; she is unsure of home settings.     PLAN:    Continue CPAP qhs if patient's functional status is not a barrier to safe use.  Caution with usage of medications that may cause respiratory suppression in the perioperative period.  Caution with patient functional status.    Gastric adenocarcinoma  Per chart review:     Patient was diagnosed in 2021 with T1 gastric adenocarcinoma sp ESD. She received 4 cycles of FLOT 7 - 9/2021 but was not felt to be a surgical candidate after chemotherapy due to comorbidities. Patient subsequently treated with chemoradiation 2-3/2022. She follows with Dr. Brown, most recent surveillance EGD without evidence of recurrence. Last CT CAP with Dr. Pelaez 11/2023.    PLAN:    Management " out-pt.    Debility  Recommendations, per chart review:    -  Encourage mobility, OOB in chair, and early ambulation as appropriate  -  PT/OT evaluate and treat  -  Pain management  -  DVT prophylaxis if appropriate   -  Monitor for and prevent skin breakdown and pressure ulcers  Early mobility, repositioning/weight shifting every 20-30 minutes when sitting, turn patient every 2 hours, proper mattress/overlay and chair cushioning, pressure relief/heel protector boots     PLAN:    Management per primary; agree with plan as above.  PT/OT on board.     Tobacco abuse  Was Using Nicotine patch as needed when she has the urge to smoking. Has not needed this patch during this admission.    CT findings s/f findings suggestive of  COPD, in context of smoking.    Per chart review 01/18/24 PCP Office Visit:    Quit Smoking > 3 months     I suggested to consider stopping  smoking tobacco for its benefits in the jailene operative period and in the long term.  I Informed about risk of wound healing problem ,infection,lung complications,thrombosis with tobacco use.  Suggested quitting tobacco.  Offered tobacco cessation service.  Not known to have COPD, Bronchitis, Emphysema, wheezing , chronic phlegm.  No inhaler, oxygen use.    PLAN:    Has not required nicotine patch this admission; continue to defer.    Hypothyroidism  No history of treatment found on chart review.     TSH (01/18/24) - 0.280  Free T4 (01/18/24) - 0.99     Findings as above s/f subclinical hypothyroidism.    PLAN:    Outpatient PCP f/u for monitoring and management.      Hypercoagulable state          VTE Risk Mitigation (From admission, onward)           Ordered     enoxaparin injection 60 mg  Every 12 hours         12/13/24 1136     IP VTE HIGH RISK PATIENT  Once         11/20/24 2045     Place sequential compression device  Until discontinued         11/20/24 2045                    Discharge Planning   ZAIDA: 12/13/2024     Code Status: Full Code   Medical  Readiness for Discharge Date: 12/10/2024  Discharge Plan A: Skilled Nursing Facility   Discharge Delays: (!) Post-Acute Set-up            Please place Justification for DME        Josemanuel Lam MD  Department of Hospital Medicine   [facility] - Neurosurgery ([facility])

## 2024-12-14 NOTE — SUBJECTIVE & OBJECTIVE
Interval History: NAEO, VSS. K remains high; however, had 1x bowel movement and K now trending down. Low aldosterone activity labs (-), cortisol pending. Will continue to monitor.     Review of Systems   Reason unable to perform ROS: See HPI.     Objective:     Vital Signs (Most Recent):  Temp: 98.1 °F (36.7 °C) (12/13/24 1513)  Pulse: 84 (12/13/24 1513)  Resp: 18 (12/13/24 1513)  BP: 118/60 (12/13/24 1513)  SpO2: 98 % (12/13/24 1513) Vital Signs (24h Range):  Temp:  [98.1 °F (36.7 °C)-98.4 °F (36.9 °C)] 98.1 °F (36.7 °C)  Pulse:  [] 84  Resp:  [16-18] 18  SpO2:  [94 %-99 %] 98 %  BP: ()/(53-60) 118/60     Weight: (!) 147.9 kg (326 lb)  Body mass index is 52.62 kg/m².    Intake/Output Summary (Last 24 hours) at 12/13/2024 1938  Last data filed at 12/13/2024 0442  Gross per 24 hour   Intake --   Output 400 ml   Net -400 ml         Physical Exam  Vitals and nursing note reviewed.   Constitutional:       Appearance: Normal appearance.   Pulmonary:      Effort: Pulmonary effort is normal. No respiratory distress.   Abdominal:      General: There is no distension.   Musculoskeletal:         General: No swelling.      Cervical back: Normal range of motion and neck supple.      Right lower leg: No edema.      Left lower leg: No edema.      Comments: Left sided weakness.    Skin:     General: Skin is warm and dry.      Comments: Appears euvolemic on exam.   Neurological:      General: No focal deficit present.      Mental Status: She is alert.      Motor: Weakness present.   Psychiatric:         Speech: Speech is delayed.         Behavior: Behavior is cooperative.      Comments: Difficulty with articulating thoughts 2/2 aphasia. Responsive on interview.             Significant Labs: All pertinent labs within the past 24 hours have been reviewed.  Recent Lab Results  (Last 5 results in the past 24 hours)        12/13/24  1632   12/13/24  1155   12/13/24  0903   12/13/24  0335   12/12/24  2202        Anion Gap 5    5   4   6                7                5         Baso #     0.03   0.03         Basophil %     0.5   0.5         BUN 38   41   40   42                45                43         Calcium 10.1   10.2   9.9   10.0                10.2                10.0         Chloride 115   115   115   114                115                114         CO2 19   19   19   19                20                19         Creatinine 0.8   0.8   0.8   1.0                0.9                1.0         Differential Method     Automated   Automated         eGFR >60.0   >60.0   >60.0   >60.0                >60.0                >60.0         Eos #     0.1   0.1         Eos %     1.6   1.3         Folate       11.2         Glucose 95   109   94   99                101                101         Gran # (ANC)     3.3   3.4         Gran %     58.8   53.2         Haptoglobin     189           Hematocrit     30.3   29.2         Hemoglobin     9.4   9.4         Immature Grans (Abs)     0.01  Comment: Mild elevation in immature granulocytes is non specific and   can be seen in a variety of conditions including stress response,   acute inflammation, trauma and pregnancy. Correlation with other   laboratory and clinical findings is essential.     0.01  Comment: Mild elevation in immature granulocytes is non specific and   can be seen in a variety of conditions including stress response,   acute inflammation, trauma and pregnancy. Correlation with other   laboratory and clinical findings is essential.           Immature Granulocytes     0.2   0.2         Lymph #     1.6   2.2         Lymph %     28.7   35.0         Magnesium        2.0         MCH     32.9   34.2         MCHC     31.0   32.2         MCV     106   106         Mono #     0.6   0.6         Mono %     10.2   9.8         MPV     10.6   10.7         nRBC     0   0         Pathologist Review     Review required           Patient Collection Position     Upright/Seated  Comment: Renin Normal  Range:    Upright <= 40 yrs 4.2-52.2  >40 yrs 3.6-81.6  Supine  <= 40 yrs 3.2-33.2  >40 yrs 2.5-45.1              Phosphorus Level       3.5         Platelet Count     201   209         POCT Glucose         116       Potassium 5.4  Comment: *No Visible Hemolysis   5.6  Comment: *No Visible Hemolysis   5.3  Comment: *No Visible Hemolysis   5.2          5.4  Comment: *No Visible Hemolysis   5.6  Comment: *No Visible Hemolysis   5.3  Comment: *No Visible Hemolysis   5.3  Comment: *No Visible Hemolysis                5.3  Comment: *No Visible Hemolysis                5.2                5.2         RBC     2.86   2.75         RDW     13.4   13.4         Renin     476.40  Comment: Renin Normal Range:    Upright <= 40 yrs 4.2-52.2  >40 yrs 3.6-81.6  Supine  <= 40 yrs 3.2-33.2  >40 yrs 2.5-45.1              Sodium 139   139   138   139                142                138         Vitamin B12       858         WBC     5.68   6.35                                Significant Imaging: I have reviewed all pertinent imaging results/findings within the past 24 hours.

## 2024-12-14 NOTE — PROGRESS NOTES
[facility] - Neurosurgery ([facility])  [facility] Medicine  Progress Note    Patient Name: Ca Coats  MRN: [id number]  Patient Class: IP- Inpatient   Admission Date: 11/20/2024  Length of Stay: 24 days  Attending Physician: Kaushal Dasilva DO  Primary Care Provider: Lei Garcia MD        Subjective     Principal Problem:Cerebral aneurysm        HPI:  73F w/ PMHx s/f HTN, LAURA (CPAP qhs), heart failure per chart (10/22/24 Echo w/ EF 60-65% w/ L+R ventricle normal systolic and diastolic function; moderate aortic sclerosis, severely dilated L. atrium, dilated R. atrium, mild tricuspid regurg), obesity, hx gastric cancer s/p chemoradiation, and DVT (Eliquis). Initially referred to neurology for dizziness workup; originally complaining of vertigo multiple times a day for the past several months, associated with shortness of breath, palpitation, nausea, headache, and feeling faint.     Now s/p Left Crani w/ several clips applied for cerebral aneurysm on 11/20/2024. 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 She became less responsive as well as developed a L gaze preference and dense aphasia. She had a perfusion scan with questionable perfusion deficit to the left anterior temporal lobe and milrinone was started with some clinical improvement as per chart review. Now gaze preference resolved, following commands x4 antigravity, able to state name but still remains aphasic.     Hospital medicine consulted for YOUNG (Cr 1.6 -> 1.2 -> 0.9 s/p fluids; baseline 0.8-1.0). On history, she is reluctant to drink fluids. On initial chart review, potassium very elevated, now down-trending (5.3 -> 5.9 -> 5.2).      Interval History: NAEO, VSS. K resolved with BM. Scr continues to improve.     Review of Systems   Reason unable to perform ROS: See HPI.     Objective:     Vital Signs (Most  Recent):  Temp: 98.4 °F (36.9 °C) (12/14/24 0713)  Pulse: 73 (12/14/24 0713)  Resp: 18 (12/14/24 0713)  BP: (!) 115/54 (12/14/24 0713)  SpO2: 98 % (12/14/24 0713) Vital Signs (24h Range):  Temp:  [97.4 °F (36.3 °C)-98.4 °F (36.9 °C)] 98.4 °F (36.9 °C)  Pulse:  [73-92] 73  Resp:  [18] 18  SpO2:  [95 %-98 %] 98 %  BP: (115-133)/(54-72) 115/54     Weight: (!) 147.9 kg (326 lb)  Body mass index is 52.62 kg/m².    Intake/Output Summary (Last 24 hours) at 12/14/2024 1106  Last data filed at 12/14/2024 0557  Gross per 24 hour   Intake --   Output 300 ml   Net -300 ml         Physical Exam  Vitals and nursing note reviewed.   Constitutional:       Appearance: Normal appearance.   Pulmonary:      Effort: Pulmonary effort is normal. No respiratory distress.   Abdominal:      General: There is no distension.   Musculoskeletal:         General: No swelling.      Cervical back: Normal range of motion and neck supple.      Right lower leg: No edema.      Left lower leg: No edema.      Comments: Left sided weakness.    Skin:     General: Skin is warm and dry.      Comments: Appears euvolemic on exam.   Neurological:      General: No focal deficit present.      Mental Status: She is alert.      Motor: Weakness present.   Psychiatric:         Speech: Speech is delayed.         Behavior: Behavior is cooperative.      Comments: Difficulty with articulating thoughts 2/2 aphasia. Responsive on interview.             Significant Labs: All pertinent labs within the past 24 hours have been reviewed.      Significant Imaging: I have reviewed all pertinent imaging results/findings within the past 24 hours.    Assessment and Plan     * Cerebral aneurysm  PLAN:    Management per primary.      Hyperkalemia  Likely 2/2 YOUNG, resolved  Without EKG changes. Findings not concerning for hyperkalemic emergency. Now s/p 1x dose lokelma.     PLAN:    Monitor for arrhythmias with EKG and/or continuous telemetry.   Continue to monitor    Oral  thrush  PLAN:    Continue Velazquez's solution (contains nystatin).      Chronic anticoagulation  Per chart review:    Sept 2021  Thrombosis of the right popliteal, posterior tibial, and peroneal veins.      History of DVT  No PE  1 Episode  Associated with reduced mobility from getting Chemo, Radiation at that time,  No long journeys, cancer,no prior surgery, No Hospital stay, no HRT  Anticoagulated   IVC filter  - none     On Anticoagulation - Apixaban     Provoked DVT     - Cancer   - Reduced Mobility   - Tobacco      On on going Anticoagulation as - she is less mobile      No Afib     PLAN:    Continue holding apixaban in setting of IPH.      Hypercoagulable state        Neuropathy  Per chart review, 01/18/24:    She has tingling numbness of the hands  She has not a diabetic and does not drink alcohol excessively  She has not known to have cervical spine problems  She has no balance problems, problems with small objects or dropping things  She has not known to have carpal tunnel   She is on gabapentin that is helping    PLAN:    Continue gabapentin.   If YOUNG recurs or worsens, d/c gabapentin as this medication is renally cleared.    Gastric adenocarcinoma  Per chart review:     Patient was diagnosed in 2021 with T1 gastric adenocarcinoma sp ESD. She received 4 cycles of FLOT 7 - 9/2021 but was not felt to be a surgical candidate after chemotherapy due to comorbidities. Patient subsequently treated with chemoradiation 2-3/2022. She follows with Dr. Brown, most recent surveillance EGD without evidence of recurrence. Last CT CAP with Dr. Pelaez 11/2023.    PLAN:    Management out-pt.    Tobacco abuse  Was Using Nicotine patch as needed when she has the urge to smoking. Has not needed this patch during this admission.    CT findings s/f findings suggestive of  COPD, in context of smoking.    Per chart review 01/18/24 PCP Office Visit:    Quit Smoking > 3 months     I suggested to consider stopping  smoking tobacco for  "its benefits in the jailene operative period and in the long term.  I Informed about risk of wound healing problem ,infection,lung complications,thrombosis with tobacco use.  Suggested quitting tobacco.  Offered tobacco cessation service.  Not known to have COPD, Bronchitis, Emphysema, wheezing , chronic phlegm.  No inhaler, oxygen use.    PLAN:    Has not required nicotine patch this admission; continue to defer.    Pure hypercholesterolemia  PLAN:    On home Atorvastatin 20mg qD.      Chronic diastolic congestive heart failure  Latest ECHO  Results for orders placed during the hospital encounter of 10/22/24    Echo    Interpretation Summary    Left Ventricle: The left ventricle is normal in size. Normal wall thickness. There is normal systolic function with a visually estimated ejection fraction of 60 - 65%. There is normal diastolic function.    Right Ventricle: Normal right ventricular cavity size. Wall thickness is normal. Systolic function is normal.    Left Atrium: Left atrium is severely dilated.    Right Atrium: Right atrium is dilated.    Aortic Valve: The aortic valve is a trileaflet valve. There is moderate aortic valve sclerosis. Mildly restricted motion.    Tricuspid Valve: There is mild regurgitation.    IVC/SVC: Normal venous pressure at 3 mmHg.    Per cardiology note 11/15/24:    "Currently not having anginal symptoms and had a negative LHC in 2021. Stress echo and SPECT likely will not add information due to body habitus and PET will not be available prior to scheduled urgent surgery. She is probably at a moderate risk for cardiovascular events in a low to intermediate cardiac risk surgery. Recommend proceeding without any further cardiac testing. Discussed risks and benefits with patients and she is in agreement. "     Current Heart Failure Medications  hydrALAZINE injection 10 mg, Every 4 hours PRN, Intravenous  labetalol 20 mg/4 mL (5 mg/mL) IV syring, Every 4 hours PRN, Intravenous  hydrALAZINE " tablet 50 mg, Every 8 hours, Oral    PLAN:    Latest Echo (10/22/24, as above) does not appear c/f HFpEF; however, chart review is s/f this diagnosis.  Monitor strict I&Os every 4 hours and daily weights.    On telemetry.  The patient's volume status is stable.  Appears euvolemic on exam 12/12/24.    Hypothyroidism  No history of treatment found on chart review.     TSH (01/18/24) - 0.280  Free T4 (01/18/24) - 0.99     Findings as above s/f subclinical hypothyroidism.    PLAN:    Outpatient PCP f/u for monitoring and management.      Debility  Recommendations, per chart review:    -  Encourage mobility, OOB in chair, and early ambulation as appropriate  -  PT/OT evaluate and treat  -  Pain management  -  DVT prophylaxis if appropriate   -  Monitor for and prevent skin breakdown and pressure ulcers  Early mobility, repositioning/weight shifting every 20-30 minutes when sitting, turn patient every 2 hours, proper mattress/overlay and chair cushioning, pressure relief/heel protector boots     PLAN:    Management per primary; agree with plan as above.  PT/OT on board.     Gait instability  PLAN:    PT/OT on board; management per primary.      Severe obesity (BMI >= 40)  Body mass index is 52.62 kg/m². Morbid obesity complicates all aspects of disease management from diagnostic modalities to treatment. Weight loss encouraged and health benefits explained to patient.         Essential hypertension  Per Primary:    -Keep SBP <160.  -Hydralazine 50mg q8.  -Lisinopril held in setting of YOUNG (previously on 40mg qD).    PLAN:    Agree with plan as above.     Obstructive sleep apnea  On nightly CPAP at home; she is unsure of home settings.     PLAN:    Continue CPAP qhs if patient's functional status is not a barrier to safe use.  Caution with usage of medications that may cause respiratory suppression in the perioperative period.  Caution with patient functional status.      VTE Risk Mitigation (From admission, onward)            Ordered     enoxaparin injection 60 mg  Every 12 hours         12/13/24 1136     IP VTE HIGH RISK PATIENT  Once         11/20/24 2045     Place sequential compression device  Until discontinued         11/20/24 2045                    Discharge Planning   ZAIDA: 12/13/2024     Code Status: Full Code   Medical Readiness for Discharge Date: 12/10/2024  Discharge Plan A: Skilled Nursing Facility   Discharge Delays: (!) Post-Acute Set-up            Please place Justification for DME        Natalie Pike MD  Department of Hospital Medicine   [facility] - Neurosurgery ([facility])

## 2024-12-14 NOTE — PT/OT/SLP PROGRESS
Physical Therapy Treatment    Patient Name:  Ca Coats   MRN:  [id number]    Recommendations:     Discharge Recommendations: High Intensity Therapy  Discharge Equipment Recommendations: lift device, hospital bed, wheelchair  Barriers to discharge:  Increased assistance needed    Assessment:     Ca Coats is a 73 y.o. female admitted with a medical diagnosis of Cerebral aneurysm.  She presents with the following impairments/functional limitations: weakness, impaired endurance, impaired self care skills, impaired functional mobility, gait instability, impaired sensation, impaired balance, decreased coordination, decreased upper extremity function, decreased lower extremity function, decreased safety awareness, decreased ROM, abnormal tone, impaired coordination, impaired cardiopulmonary response to activity. Pt tolerated session well and demonstrated improved activity tolerance while requiring less assistance. Pt does continue to require significant assist but pt is improving.     Rehab Prognosis: Good; patient would benefit from acute skilled PT services to address these deficits and reach maximum level of function.    Recent Surgery: Procedure(s) (LRB):  CRANIOTOMY, WITH ANEURYSM CLIPPING W/ STEALTH (Left)  CRANIOTOMY, FOR ANEURYSM OF VERTEBROBASILAR OR CAROTID CIRCULATION (Left)  DISSECTION, NERVE, USING OPERATING MICROSCOPE (Left) 24 Days Post-Op    Plan:     During this hospitalization, patient to be seen 4 x/week to address the identified rehab impairments via therapeutic activities, therapeutic exercises, neuromuscular re-education, gait training and progress toward the following goals:    Plan of Care Expires:  12/24/24    Subjective     Chief Complaint: None  Patient/Family Comments/goals: None verbalized  Pain/Comfort:  Pain Rating 1: 0/10      Objective:     Communicated with RN prior to session.  Patient found HOB elevated with telemetry, bed alarm, SCD, PureWick upon PT entry to  room.     General Precautions: Standard, aphasia, fall  Orthopedic Precautions: N/A  Braces: N/A  Respiratory Status: Room air     Functional Mobility:  Bed Mobility:   Performed several time for Pericare x2  Rolling Left:  moderate assistance  Rolling Right: moderate assistance  Scooting: maximal assistance  Supine to Sit: maximal assistance  Sit to Supine: moderate assistance  Transfers:     Sit to Stand:  moderate assistance and of 2 persons with hand-held assist  Gait: Deferred due to poor standing tolerance  Balance: EOB Sitting: CGA- SBA. ~12 minutes   Increased time spent working on midline orientation, trunk control, postural awareness and seated weight shifting.       AM-PAC 6 CLICK MOBILITY  Turning over in bed (including adjusting bedclothes, sheets and blankets)?: 2  Sitting down on and standing up from a chair with arms (e.g., wheelchair, bedside commode, etc.): 2  Moving from lying on back to sitting on the side of the bed?: 2  Moving to and from a bed to a chair (including a wheelchair)?: 2  Need to walk in hospital room?: 2  Climbing 3-5 steps with a railing?: 1  Basic Mobility Total Score: 11       Treatment & Education:  Pt educated on there purpose, goals and benefits of today's session. Pt informed on progress made and improved tolerance to activity today. Pt was given VC's throughout session for sequencing, hand placements and safety. Pt was encourage to follow commands to improve outcome of activities being performed.     Patient left HOB elevated with all lines intact, call button in reach, bed alarm on, and RN notified..    GOALS:   Multidisciplinary Problems       Physical Therapy Goals          Problem: Physical Therapy    Goal Priority Disciplines Outcome Interventions   Physical Therapy Goal     PT, PT/OT Progressing    Description: Goals to be met by: 2024     Patient will increase functional independence with mobility by performin. Supine to sit with MInimal Assistance  2.  Sit to supine with Minimal Assistance  3. Sit to stand transfer with Moderate Assistance  4. Bed to chair transfer with Maximum Assistance using LRAD  5. Gait  x 5 feet with Maximum Assistance using LRAD.   6. Sitting at edge of bed x5 minutes with Stand-by Assistance  7. Lower extremity exercise program x15 reps per handout, with assistance as needed    Hospital Bed - Patient requires a hospital bed for positioning of the body in ways that are not feasible with an ordinary bed. The patient requires special positioning for pain relief, limited mobility, and/or being unable to independently make changes in body position without the use of a hospital bed. Pillows and wedges will not be adequate for resolving these positional issues.     Wheelchair - Patient has a mobility limitation that significantly impairs their ability to participate in one or more mobility related activities of daily living in customary locations in the home. The mobility limitation cannot be sufficiently resolved by the use of a cane or walker. The use of a manual wheelchair will greatly improve the patient's ability to participate in MRADLs. The patient will use the wheelchair on a regular basis at home. They have expressed their willingness to use a manual wheelchair in the home, and have a caregiver who is available and willing to assist with the wheelchair if needed                         Time Tracking:     PT Received On: 12/14/24  PT Start Time: 0745     PT Stop Time: 0812  PT Total Time (min): 27 min     Billable Minutes: Therapeutic Activity 15 and Neuromuscular Re-education 12    Treatment Type: Treatment  PT/PTA: PTA     Number of PTA visits since last PT visit: 2     12/14/2024

## 2024-12-14 NOTE — SUBJECTIVE & OBJECTIVE
Interval History: NAEON. AFVSS. Neuro stable. BM yesterday and this morning. Pending placement.    Medications:  Continuous Infusions:  Scheduled Meds:   atorvastatin  20 mg Oral Daily    citalopram  10 mg Oral Daily    duke's soln (benadryl 30 mL, mylanta 30 mL, LIDOcaine 30 mL, nystatin 30 mL) 120 mL  10 mL Oral QID    enoxparin  60 mg Subcutaneous Q12H (prophylaxis, 0900/2100)    gabapentin  600 mg Oral Q8H    hydrALAZINE  50 mg Oral Q8H    levetiracetam  1,000 mg Oral BID    polyethylene glycol  17 g Oral Daily    senna-docusate 8.6-50 mg  1 tablet Oral BID     PRN Meds:  Current Facility-Administered Medications:     acetaminophen, 650 mg, Oral, Q4H PRN    bisacodyL, 10 mg, Rectal, Daily PRN    [COMPLETED] calcium gluconate IVPB, 1 g, Intravenous, Once **AND** calcium gluconate IVPB, 1 g, Intravenous, Q10 Min PRN    hydrALAZINE, 10 mg, Intravenous, Q4H PRN    labetalol, 10 mg, Intravenous, Q4H PRN    morphine, 2 mg, Intravenous, Q6H PRN    naloxone, 0.4 mg, Intravenous, PRN    ondansetron, 4 mg, Intravenous, Q12H PRN    oxyCODONE, 5 mg, Oral, Q6H PRN    sodium chloride 0.9%, 10 mL, Intravenous, PRN     Objective:     Weight: (!) 147.9 kg (326 lb)  Body mass index is 52.62 kg/m².  Vital Signs (Most Recent):  Temp: 98.4 °F (36.9 °C) (12/14/24 0713)  Pulse: 73 (12/14/24 0713)  Resp: 18 (12/14/24 0713)  BP: (!) 115/54 (12/14/24 0713)  SpO2: 98 % (12/14/24 0713) Vital Signs (24h Range):  Temp:  [97.4 °F (36.3 °C)-98.4 °F (36.9 °C)] 98.4 °F (36.9 °C)  Pulse:  [73-92] 73  Resp:  [18] 18  SpO2:  [95 %-98 %] 98 %  BP: (115-133)/(54-72) 115/54                         Female External Urinary Catheter w/ Suction 11/21/24 1400 (Active)   Skin no redness;no breakdown 12/11/24 0715   Tolerance no signs/symptoms of discomfort 12/11/24 0715   Suction Continuous suction at 60 mmHg 12/11/24 0715   Date of last wick change 12/11/24 12/11/24 0715   Time of last wick change 1000 12/08/24 0800   Output (mL) 550 mL 12/11/24 1556  "    Neurosurgery Physical Exam  General: well developed, well nourished, no distress.   Head: normocephalic, cranial incision c/d/I with chromic suture  Mental Status: Awake, Alert, Oriented x 2  Speech: Clear with content appropriate to conversation, mild aphasia  Cranial nerves: face symmetric, CN II-XII grossly intact.   Eyes: pupils equal, round, reactive to light, EOMI.  Sensory: intact to light touch throughout  Motor Strength: Moves all extremities spontaneously with good tone. At least antigravity strength in upper and lower extremities. No focal weakness identified. No abnormal movements seen.   Follows commands x 4 extremities  Skin: Skin is warm, dry and intact.       Significant Labs:  Recent Labs   Lab 12/13/24  0335 12/13/24  0903 12/14/24  0009 12/14/24  0422 12/14/24  0844   GLU 99  101  101   < > 97 89 103     142  138   < > 137 139 140   K 5.2*  5.3*  5.3*  5.2*  5.2*   < > 4.9  4.9 4.9  4.9 5.1  5.1   *  115*  114*   < > 116* 115* 115*   CO2 19*  20*  19*   < > 18* 19* 18*   BUN 42*  45*  43*   < > 35* 33* 30*   CREATININE 1.0  0.9  1.0   < > 0.7 0.7 0.7   CALCIUM 10.0  10.2  10.0   < > 10.0 10.0 10.3   MG 2.0  --   --   --   --     < > = values in this interval not displayed.     Recent Labs   Lab 12/12/24  1602 12/13/24  0335 12/13/24  0903   WBC 5.81 6.35 5.68   HGB 9.7* 9.4* 9.4*   HCT 32.1* 29.2* 30.3*    209 201     No results for input(s): "LABPT", "INR", "APTT" in the last 48 hours.  Microbiology Results (last 7 days)       ** No results found for the last 168 hours. **          All pertinent labs from the last 24 hours have been reviewed.    Significant Diagnostics:  I have reviewed and interpreted all pertinent imaging results/findings within the past 24 hours.    "

## 2024-12-14 NOTE — SUBJECTIVE & OBJECTIVE
Interval History: NAEO, VSS. K resolved with BM. Scr continues to improve.     Review of Systems   Reason unable to perform ROS: See HPI.     Objective:     Vital Signs (Most Recent):  Temp: 98.4 °F (36.9 °C) (12/14/24 0713)  Pulse: 73 (12/14/24 0713)  Resp: 18 (12/14/24 0713)  BP: (!) 115/54 (12/14/24 0713)  SpO2: 98 % (12/14/24 0713) Vital Signs (24h Range):  Temp:  [97.4 °F (36.3 °C)-98.4 °F (36.9 °C)] 98.4 °F (36.9 °C)  Pulse:  [73-92] 73  Resp:  [18] 18  SpO2:  [95 %-98 %] 98 %  BP: (115-133)/(54-72) 115/54     Weight: (!) 147.9 kg (326 lb)  Body mass index is 52.62 kg/m².    Intake/Output Summary (Last 24 hours) at 12/14/2024 1106  Last data filed at 12/14/2024 0557  Gross per 24 hour   Intake --   Output 300 ml   Net -300 ml         Physical Exam  Vitals and nursing note reviewed.   Constitutional:       Appearance: Normal appearance.   Pulmonary:      Effort: Pulmonary effort is normal. No respiratory distress.   Abdominal:      General: There is no distension.   Musculoskeletal:         General: No swelling.      Cervical back: Normal range of motion and neck supple.      Right lower leg: No edema.      Left lower leg: No edema.      Comments: Left sided weakness.    Skin:     General: Skin is warm and dry.      Comments: Appears euvolemic on exam.   Neurological:      General: No focal deficit present.      Mental Status: She is alert.      Motor: Weakness present.   Psychiatric:         Speech: Speech is delayed.         Behavior: Behavior is cooperative.      Comments: Difficulty with articulating thoughts 2/2 aphasia. Responsive on interview.             Significant Labs: All pertinent labs within the past 24 hours have been reviewed.      Significant Imaging: I have reviewed all pertinent imaging results/findings within the past 24 hours.

## 2024-12-14 NOTE — ASSESSMENT & PLAN NOTE
YOUNG is likely due to pre-renal azotemia due to intravascular volume depletion. Baseline creatinine is  0.8-1.0 . Most recent creatinine and eGFR are listed below.  Recent Labs     12/14/24  0009 12/14/24  0422 12/14/24  0844   CREATININE 0.7 0.7 0.7   EGFRNORACEVR >60.0 >60.0 >60.0       PLAN:    YOUNG is resolved  Avoid nephrotoxins and renally dose meds for GFR listed above  Monitor urine output, serial BMP, and adjust therapy as needed  Will monitor daily for changes.

## 2024-12-14 NOTE — PROGRESS NOTES
[facility] - Neurosurgery ([facility])  Neurosurgery  Progress Note    Subjective:     History of Present Illness: Isabel Coats is a 73-year-old female past medical history of hypertension, obstructive sleep apnea on CPAP, suggestive heart failure, obesity, history of gastric cancer status post chemoradiation and DVT in currently on Eliquis. She presents to [facility] L pterional crani, 7 clips applied in proximity to large irregular L MCA bifurcation aneurysm and other adjacent small aneurysms, 1 clip applied across neck of aneurysm just proximal to anterior choroidal.  She also has a current smoker she was initially referred to Neurology for workup for dizziness. She had been complaining of vertigo multiple times a day for the past several months, associated with shortness of breath, palpitation, nausea, headache, and feeling faint. She is admitted to [facility] for a higher level of care.     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 AFVSS. Neuro stable. BM yesterday and this morning. Pending placement.    Medications:  Continuous Infusions:  Scheduled Meds:   atorvastatin  20 mg Oral Daily    citalopram  10 mg Oral Daily    duke's soln (benadryl 30 mL, mylanta 30 mL, LIDOcaine 30 mL, nystatin 30 mL) 120 mL  10 mL Oral QID    enoxparin  60 mg Subcutaneous Q12H (prophylaxis, 0900/2100)    gabapentin  600 mg Oral Q8H    hydrALAZINE  50 mg Oral Q8H    levetiracetam  1,000 mg Oral BID    polyethylene glycol  17 g Oral Daily    senna-docusate 8.6-50 mg  1 tablet Oral BID     PRN Meds:  Current Facility-Administered Medications:     acetaminophen, 650 mg, Oral, Q4H PRN    bisacodyL, 10 mg, Rectal, Daily PRN    [COMPLETED] calcium gluconate IVPB, 1 g, Intravenous, Once **AND** calcium gluconate IVPB, 1 g, Intravenous,  Q10 Min PRN    hydrALAZINE, 10 mg, Intravenous, Q4H PRN    labetalol, 10 mg, Intravenous, Q4H PRN    morphine, 2 mg, Intravenous, Q6H PRN    naloxone, 0.4 mg, Intravenous, PRN    ondansetron, 4 mg, Intravenous, Q12H PRN    oxyCODONE, 5 mg, Oral, Q6H PRN    sodium chloride 0.9%, 10 mL, Intravenous, PRN     Objective:     Weight: (!) 147.9 kg (326 lb)  Body mass index is 52.62 kg/m².  Vital Signs (Most Recent):  Temp: 98.4 °F (36.9 °C) (12/14/24 0713)  Pulse: 73 (12/14/24 0713)  Resp: 18 (12/14/24 0713)  BP: (!) 115/54 (12/14/24 0713)  SpO2: 98 % (12/14/24 0713) Vital Signs (24h Range):  Temp:  [97.4 °F (36.3 °C)-98.4 °F (36.9 °C)] 98.4 °F (36.9 °C)  Pulse:  [73-92] 73  Resp:  [18] 18  SpO2:  [95 %-98 %] 98 %  BP: (115-133)/(54-72) 115/54                         Female External Urinary Catheter w/ Suction 11/21/24 1400 (Active)   Skin no redness;no breakdown 12/11/24 0715   Tolerance no signs/symptoms of discomfort 12/11/24 0715   Suction Continuous suction at 60 mmHg 12/11/24 0715   Date of last wick change 12/11/24 12/11/24 0715   Time of last wick change 1000 12/08/24 0800   Output (mL) 550 mL 12/11/24 1556     Neurosurgery Physical Exam  General: well developed, well nourished, no distress.   Head: normocephalic, cranial incision c/d/I with chromic suture  Mental Status: Awake, Alert, Oriented x 2  Speech: Clear with content appropriate to conversation, mild aphasia  Cranial nerves: face symmetric, CN II-XII grossly intact.   Eyes: pupils equal, round, reactive to light, EOMI.  Sensory: intact to light touch throughout  Motor Strength: Moves all extremities spontaneously with good tone. At least antigravity strength in upper and lower extremities. No focal weakness identified. No abnormal movements seen.   Follows commands x 4 extremities  Skin: Skin is warm, dry and intact.       Significant Labs:  Recent Labs   Lab 12/13/24  0335 12/13/24  0903 12/14/24  0009 12/14/24  0422 12/14/24  0844   GLU 99  101  101    "< > 97 89 103     142  138   < > 137 139 140   K 5.2*  5.3*  5.3*  5.2*  5.2*   < > 4.9  4.9 4.9  4.9 5.1  5.1   *  115*  114*   < > 116* 115* 115*   CO2 19*  20*  19*   < > 18* 19* 18*   BUN 42*  45*  43*   < > 35* 33* 30*   CREATININE 1.0  0.9  1.0   < > 0.7 0.7 0.7   CALCIUM 10.0  10.2  10.0   < > 10.0 10.0 10.3   MG 2.0  --   --   --   --     < > = values in this interval not displayed.     Recent Labs   Lab 12/12/24  1602 12/13/24  0335 12/13/24  0903   WBC 5.81 6.35 5.68   HGB 9.7* 9.4* 9.4*   HCT 32.1* 29.2* 30.3*    209 201     No results for input(s): "LABPT", "INR", "APTT" in the last 48 hours.  Microbiology Results (last 7 days)       ** No results found for the last 168 hours. **          All pertinent labs from the last 24 hours have been reviewed.    Significant Diagnostics:  I have reviewed and interpreted all pertinent imaging results/findings within the past 24 hours.    Assessment/Plan:     * Cerebral aneurysm  Isabel Coats is a 73-year-old female past medical history of hypertension, obstructive sleep apnea on CPAP, suggestive heart failure, obesity, history of gastric cancer status post chemoradiation and DVT in currently on Eliquis. She presents to [facility] s/p L pterional crani on 11/20 with 7 clips applied in proximity to large irregular L MCA bifurcation aneurysm and other adjacent small aneurysms, 1 clip applied across neck of aneurysm just proximal to anterior choroidal.  After surgery she seemed to be gradually waking up appropriately overnight, then AM of POD1 around shift change had an unexplained decline in exam during which stat CT showed IPH in the right side of her cerebellum. She became less responsive as well as developed a L gaze preference and dense aphasia. She had a perfusion scan with questionable perfusion deficit to the left anterior temporal lobe and milrinone was started with seemingly some clinical improvement. Now gaze preference " resolved, following commands x4 antigravity, able to state name but still remains aphasic.    Imaging:  CTA Head 11/20: expected post-op changes  CTH 11/21 8 AM: right sided cerebellar IPH  CTA 11/21 10 AM: stable IPH, possible frontal contusion  CTP 11/21 4 PM: possible left anterior temporal perfusion deficit in territory of branches distal to MCA clips   MRI brain w wo 11/22: no strokes, post op changes, stable cerebellar bleed. some edema in right frontal lobe.    Plans:  -Admitted to NSGY floor, q4h nc/vs  -SBP <160  -drain removed 11/25  -keppra 1g BID postop. cEEG negative during initial AMS POD1  -Cerebral vasospasm: weaned off milrinone prior to step down  - Daily TCDs completed  - Keep euvolemic.   -PT/OT/OOB  -PM&R consulted  -SLP - tolerating current diet but with poor intake. Continue SLP. Encourage PO fluids since NGT removed 11/28. Diet advanced to regular diet.   -Continue nutritional support with boost supplementation  -Continue to monitor clinically, notify NSGY immediately with any changes in neuro status   -SQH for DVT ppx    Dispo: pending auth rehab      Hyperkalemia  The patients most recent potassium results are listed below.  Recent Labs     12/12/24  2201 12/13/24  0335 12/13/24  0903   K 5.2*  5.2* 5.2*  5.3*  5.3*  5.2*  5.2* 5.3*  5.3*     Plan  - Monitor for arrhythmias with EKG and/or continuous telemetry.   - Hospital Medicine managing hyperkalemia              Oral thrush  - Dukes solution ordered QID  - Continue oral care     Abscess  Small R inner thigh abscess noted with seropurulent drainage.  Gen Surg consulted 11/29:  This is spontaneously and adequately draining. No significant fluctuance or induration.     -No surgical intervention indicated or recommended at this time as this area is draining well  -CTM. Wound Care following.  -Bactrim course completed    Pure hypercholesterolemia  - home lipitor 20       Chronic diastolic congestive heart failure    Latest  ECHO  Results for orders placed during the hospital encounter of 10/22/24    Echo    Interpretation Summary    Left Ventricle: The left ventricle is normal in size. Normal wall thickness. There is normal systolic function with a visually estimated ejection fraction of 60 - 65%. There is normal diastolic function.    Right Ventricle: Normal right ventricular cavity size. Wall thickness is normal. Systolic function is normal.    Left Atrium: Left atrium is severely dilated.    Right Atrium: Right atrium is dilated.    Aortic Valve: The aortic valve is a trileaflet valve. There is moderate aortic valve sclerosis. Mildly restricted motion.    Tricuspid Valve: There is mild regurgitation.    IVC/SVC: Normal venous pressure at 3 mmHg.    Current Heart Failure Medications  hydrALAZINE injection 10 mg, Every 4 hours PRN, Intravenous  labetalol 20 mg/4 mL (5 mg/mL) IV syring, Every 4 hours PRN, Intravenous  hydrALAZINE tablet 50 mg, Every 8 hours, Oral    Plan  - Monitor strict I&Os every 4 hours and daily weights.    - On telemetry  - The patient's volume status is stable        Virgie Peacock PA-C  Neurosurgery  [facility] - Neurosurgery ([facility])

## 2024-12-14 NOTE — ASSESSMENT & PLAN NOTE
Likely 2/2 YOUNG, resolved  Without EKG changes. Findings not concerning for hyperkalemic emergency. Now s/p 1x dose lokelma.     PLAN:    Monitor for arrhythmias with EKG and/or continuous telemetry.   Continue to monitor

## 2024-12-14 NOTE — ASSESSMENT & PLAN NOTE
Hyperkalemia is likely due to YOUNG.The patients most recent potassium results are listed below.  Recent Labs     12/13/24  0903 12/13/24  1155 12/13/24  1632   K 5.3*  5.3* 5.6*  5.6* 5.4*  5.4*       Without EKG changes. Findings not concerning for hyperkalemic emergency. Now s/p 1x dose lokelma.     PLAN:    Monitor for arrhythmias with EKG and/or continuous telemetry.   Treat the hyperkalemia with Potassium Binders.   Monitor potassium: Daily  The patient's hyperkalemia is improving  Had a BM this PM. K now downtrending.  Gave 1x additional lokelma.

## 2024-12-15 LAB
ALLENS TEST: ABNORMAL
ANION GAP SERPL CALC-SCNC: 6 MMOL/L (ref 8–16)
ANION GAP SERPL CALC-SCNC: 7 MMOL/L (ref 8–16)
BASOPHILS # BLD AUTO: 0.04 K/UL (ref 0–0.2)
BASOPHILS NFR BLD: 0.5 % (ref 0–1.9)
BUN SERPL-MCNC: 31 MG/DL (ref 8–23)
BUN SERPL-MCNC: 33 MG/DL (ref 8–23)
CALCIUM SERPL-MCNC: 10.1 MG/DL (ref 8.7–10.5)
CALCIUM SERPL-MCNC: 10.3 MG/DL (ref 8.7–10.5)
CHLORIDE SERPL-SCNC: 114 MMOL/L (ref 95–110)
CHLORIDE SERPL-SCNC: 114 MMOL/L (ref 95–110)
CO2 SERPL-SCNC: 18 MMOL/L (ref 23–29)
CO2 SERPL-SCNC: 20 MMOL/L (ref 23–29)
CREAT SERPL-MCNC: 0.8 MG/DL (ref 0.5–1.4)
CREAT SERPL-MCNC: 0.8 MG/DL (ref 0.5–1.4)
DELSYS: ABNORMAL
DIFFERENTIAL METHOD BLD: ABNORMAL
EOSINOPHIL # BLD AUTO: 0.2 K/UL (ref 0–0.5)
EOSINOPHIL NFR BLD: 2 % (ref 0–8)
ERYTHROCYTE [DISTWIDTH] IN BLOOD BY AUTOMATED COUNT: 13.3 % (ref 11.5–14.5)
EST. GFR  (NO RACE VARIABLE): >60 ML/MIN/1.73 M^2
EST. GFR  (NO RACE VARIABLE): >60 ML/MIN/1.73 M^2
GLUCOSE SERPL-MCNC: 104 MG/DL (ref 70–110)
GLUCOSE SERPL-MCNC: 97 MG/DL (ref 70–110)
HCO3 UR-SCNC: 20.4 MMOL/L (ref 24–28)
HCT VFR BLD AUTO: 31.8 % (ref 37–48.5)
HCT VFR BLD CALC: 28 %PCV (ref 36–54)
HGB BLD-MCNC: 9.9 G/DL (ref 12–16)
IMM GRANULOCYTES # BLD AUTO: 0.01 K/UL (ref 0–0.04)
IMM GRANULOCYTES NFR BLD AUTO: 0.1 % (ref 0–0.5)
LYMPHOCYTES # BLD AUTO: 2.5 K/UL (ref 1–4.8)
LYMPHOCYTES NFR BLD: 33.8 % (ref 18–48)
MAGNESIUM SERPL-MCNC: 1.9 MG/DL (ref 1.6–2.6)
MCH RBC QN AUTO: 33.2 PG (ref 27–31)
MCHC RBC AUTO-ENTMCNC: 31.1 G/DL (ref 32–36)
MCV RBC AUTO: 107 FL (ref 82–98)
MODE: ABNORMAL
MONOCYTES # BLD AUTO: 0.8 K/UL (ref 0.3–1)
MONOCYTES NFR BLD: 10.4 % (ref 4–15)
NEUTROPHILS # BLD AUTO: 3.9 K/UL (ref 1.8–7.7)
NEUTROPHILS NFR BLD: 53.2 % (ref 38–73)
NRBC BLD-RTO: 0 /100 WBC
PCO2 BLDA: 40.3 MMHG (ref 35–45)
PH SMN: 7.31 [PH] (ref 7.35–7.45)
PHOSPHATE SERPL-MCNC: 2.9 MG/DL (ref 2.7–4.5)
PLATELET # BLD AUTO: 219 K/UL (ref 150–450)
PMV BLD AUTO: 11.3 FL (ref 9.2–12.9)
PO2 BLDA: 83 MMHG (ref 80–100)
POC BE: -6 MMOL/L
POC IONIZED CALCIUM: 1.51 MMOL/L (ref 1.06–1.42)
POC SATURATED O2: 95 % (ref 95–100)
POC TCO2: 22 MMOL/L (ref 23–27)
POTASSIUM BLD-SCNC: 5.4 MMOL/L (ref 3.5–5.1)
POTASSIUM SERPL-SCNC: 4.5 MMOL/L (ref 3.5–5.1)
POTASSIUM SERPL-SCNC: 4.8 MMOL/L (ref 3.5–5.1)
RBC # BLD AUTO: 2.98 M/UL (ref 4–5.4)
SAMPLE: ABNORMAL
SITE: ABNORMAL
SODIUM BLD-SCNC: 138 MMOL/L (ref 136–145)
SODIUM SERPL-SCNC: 139 MMOL/L (ref 136–145)
SODIUM SERPL-SCNC: 140 MMOL/L (ref 136–145)
WBC # BLD AUTO: 7.37 K/UL (ref 3.9–12.7)

## 2024-12-15 PROCEDURE — 11000001 HC ACUTE MED/SURG PRIVATE ROOM: Mod: HCNC

## 2024-12-15 PROCEDURE — 25000003 PHARM REV CODE 250: Mod: HCNC | Performed by: PHYSICIAN ASSISTANT

## 2024-12-15 PROCEDURE — 25000003 PHARM REV CODE 250: Mod: HCNC | Performed by: STUDENT IN AN ORGANIZED HEALTH CARE EDUCATION/TRAINING PROGRAM

## 2024-12-15 PROCEDURE — 63600175 PHARM REV CODE 636 W HCPCS: Mod: HCNC

## 2024-12-15 PROCEDURE — 80048 BASIC METABOLIC PNL TOTAL CA: CPT | Mod: HCNC

## 2024-12-15 PROCEDURE — 85025 COMPLETE CBC W/AUTO DIFF WBC: CPT | Mod: HCNC | Performed by: PHYSICIAN ASSISTANT

## 2024-12-15 PROCEDURE — 25000003 PHARM REV CODE 250: Mod: HCNC

## 2024-12-15 PROCEDURE — 84100 ASSAY OF PHOSPHORUS: CPT | Mod: HCNC | Performed by: PHYSICIAN ASSISTANT

## 2024-12-15 PROCEDURE — 83735 ASSAY OF MAGNESIUM: CPT | Mod: HCNC | Performed by: PHYSICIAN ASSISTANT

## 2024-12-15 PROCEDURE — 36415 COLL VENOUS BLD VENIPUNCTURE: CPT | Mod: HCNC

## 2024-12-15 RX ADMIN — HYDRALAZINE HYDROCHLORIDE 50 MG: 25 TABLET ORAL at 09:12

## 2024-12-15 RX ADMIN — ENOXAPARIN SODIUM 60 MG: 60 INJECTION SUBCUTANEOUS at 08:12

## 2024-12-15 RX ADMIN — ALUMINUM HYDROXIDE, MAGNESIUM HYDROXIDE, AND DIMETHICONE 10 ML: 400; 400; 40 SUSPENSION ORAL at 01:12

## 2024-12-15 RX ADMIN — HYDRALAZINE HYDROCHLORIDE 50 MG: 25 TABLET ORAL at 02:12

## 2024-12-15 RX ADMIN — ALUMINUM HYDROXIDE, MAGNESIUM HYDROXIDE, AND DIMETHICONE 10 ML: 400; 400; 40 SUSPENSION ORAL at 08:12

## 2024-12-15 RX ADMIN — GABAPENTIN 600 MG: 250 SOLUTION ORAL at 02:12

## 2024-12-15 RX ADMIN — LEVETIRACETAM 1000 MG: 500 SOLUTION ORAL at 08:12

## 2024-12-15 RX ADMIN — ALUMINUM HYDROXIDE, MAGNESIUM HYDROXIDE, AND DIMETHICONE 10 ML: 400; 400; 40 SUSPENSION ORAL at 05:12

## 2024-12-15 RX ADMIN — SENNOSIDES AND DOCUSATE SODIUM 1 TABLET: 50; 8.6 TABLET ORAL at 08:12

## 2024-12-15 RX ADMIN — ATORVASTATIN CALCIUM 20 MG: 20 TABLET, FILM COATED ORAL at 08:12

## 2024-12-15 RX ADMIN — HYDRALAZINE HYDROCHLORIDE 50 MG: 25 TABLET ORAL at 05:12

## 2024-12-15 RX ADMIN — CITALOPRAM HYDROBROMIDE 10 MG: 10 TABLET ORAL at 08:12

## 2024-12-15 RX ADMIN — GABAPENTIN 600 MG: 250 SOLUTION ORAL at 09:12

## 2024-12-15 RX ADMIN — GABAPENTIN 600 MG: 250 SOLUTION ORAL at 05:12

## 2024-12-15 RX ADMIN — POLYETHYLENE GLYCOL 3350 17 G: 17 POWDER, FOR SOLUTION ORAL at 08:12

## 2024-12-15 NOTE — SUBJECTIVE & OBJECTIVE
Interval History: 12/15: NAEON. Neuro stable. Pending placement    Medications:  Continuous Infusions:  Scheduled Meds:   atorvastatin  20 mg Oral Daily    citalopram  10 mg Oral Daily    duke's soln (benadryl 30 mL, mylanta 30 mL, LIDOcaine 30 mL, nystatin 30 mL) 120 mL  10 mL Oral QID    enoxparin  60 mg Subcutaneous Q12H (prophylaxis, 0900/2100)    gabapentin  600 mg Oral Q8H    hydrALAZINE  50 mg Oral Q8H    levetiracetam  1,000 mg Oral BID    polyethylene glycol  17 g Oral Daily    senna-docusate 8.6-50 mg  1 tablet Oral BID     PRN Meds:  Current Facility-Administered Medications:     acetaminophen, 650 mg, Oral, Q4H PRN    bisacodyL, 10 mg, Rectal, Daily PRN    [COMPLETED] calcium gluconate IVPB, 1 g, Intravenous, Once **AND** calcium gluconate IVPB, 1 g, Intravenous, Q10 Min PRN    hydrALAZINE, 10 mg, Intravenous, Q4H PRN    labetalol, 10 mg, Intravenous, Q4H PRN    morphine, 2 mg, Intravenous, Q6H PRN    naloxone, 0.4 mg, Intravenous, PRN    ondansetron, 4 mg, Intravenous, Q12H PRN    oxyCODONE, 5 mg, Oral, Q6H PRN    sodium chloride 0.9%, 10 mL, Intravenous, PRN     Objective:     Weight: (!) 147 kg (324 lb 1.2 oz)  Body mass index is 52.31 kg/m².  Vital Signs (Most Recent):  Temp: 97.5 °F (36.4 °C) (12/15/24 0713)  Pulse: (!) 37 (12/15/24 0753)  Resp: 18 (12/15/24 0713)  BP: (!) 142/63 (12/15/24 0713)  SpO2: 97 % (12/15/24 0713) Vital Signs (24h Range):  Temp:  [97.4 °F (36.3 °C)-98.6 °F (37 °C)] 97.5 °F (36.4 °C)  Pulse:  [] 37  Resp:  [17-20] 18  SpO2:  [96 %-100 %] 97 %  BP: (120-142)/(52-63) 142/63                         Female External Urinary Catheter w/ Suction 11/21/24 1400 (Active)   Skin no redness;no breakdown 12/11/24 0715   Tolerance no signs/symptoms of discomfort 12/11/24 0715   Suction Continuous suction at 60 mmHg 12/11/24 0715   Date of last wick change 12/11/24 12/11/24 0715   Time of last wick change 1000 12/08/24 0800   Output (mL) 550 mL 12/11/24 1556     Neurosurgery Physical  "Exam  General: well developed, well nourished, no distress.   Head: normocephalic, cranial incision c/d/I with chromic suture  Mental Status: Awake, Alert, Oriented x 2  Speech: Clear with content appropriate to conversation, mild aphasia  Cranial nerves: face symmetric, CN II-XII grossly intact.   Eyes: pupils equal, round, reactive to light, EOMI.  Sensory: intact to light touch throughout  Motor Strength: Moves all extremities spontaneously with good tone. At least antigravity strength in upper and lower extremities. No focal weakness identified. No abnormal movements seen.   Follows commands x 4 extremities  Skin: Skin is warm, dry and intact.       Significant Labs:  Recent Labs   Lab 12/14/24  0844 12/14/24  1830 12/15/24  0251    111* 97    139 139   K 5.1  5.1 4.7 4.5   * 113* 114*   CO2 18* 19* 18*   BUN 30* 32* 33*   CREATININE 0.7 0.8 0.8   CALCIUM 10.3 10.4 10.3   MG  --   --  1.9     Recent Labs   Lab 12/15/24  0251   WBC 7.37   HGB 9.9*   HCT 31.8*        No results for input(s): "LABPT", "INR", "APTT" in the last 48 hours.  Microbiology Results (last 7 days)       ** No results found for the last 168 hours. **          All pertinent labs from the last 24 hours have been reviewed.    Significant Diagnostics:  I have reviewed and interpreted all pertinent imaging results/findings within the past 24 hours.  No results found in the last 24 hours.    "

## 2024-12-15 NOTE — PLAN OF CARE
Problem: Adult Inpatient Plan of Care  Goal: Plan of Care Review  Outcome: Progressing     Problem: Adult Inpatient Plan of Care  Goal: Patient-Specific Goal (Individualized)  Outcome: Progressing     Problem: Adult Inpatient Plan of Care  Goal: Readiness for Transition of Care  Outcome: Progressing     Problem: Wound  Goal: Optimal Coping  Outcome: Progressing     Problem: Wound  Goal: Optimal Functional Ability  Outcome: Progressing     Problem: Wound  Goal: Improved Oral Intake  Outcome: Progressing

## 2024-12-15 NOTE — PROGRESS NOTES
Dario Glover - Neurosurgery ([facility])  Neurosurgery  Progress Note    Subjective:     History of Present Illness: Isabel Coats is a 73-year-old female past medical history of hypertension, obstructive sleep apnea on CPAP, suggestive heart failure, obesity, history of gastric cancer status post chemoradiation and DVT in currently on Eliquis. She presents to [facility] L pterional crani, 7 clips applied in proximity to large irregular L MCA bifurcation aneurysm and other adjacent small aneurysms, 1 clip applied across neck of aneurysm just proximal to anterior choroidal.  She also has a current smoker she was initially referred to Neurology for workup for dizziness. She had been complaining of vertigo multiple times a day for the past several months, associated with shortness of breath, palpitation, nausea, headache, and feeling faint. She is admitted to [facility] for a higher level of care.     Post-Op Info:  Procedure(s) (LRB):  CRANIOTOMY, WITH ANEURYSM CLIPPING W/ STEALTH (Left)  CRANIOTOMY, FOR ANEURYSM OF VERTEBROBASILAR OR CAROTID CIRCULATION (Left)  DISSECTION, NERVE, USING OPERATING MICROSCOPE (Left)   25 Days Post-Op   Interval History: 12/15: ZIA. Neuro stable. Pending placement    Medications:  Continuous Infusions:  Scheduled Meds:   atorvastatin  20 mg Oral Daily    citalopram  10 mg Oral Daily    duke's soln (benadryl 30 mL, mylanta 30 mL, LIDOcaine 30 mL, nystatin 30 mL) 120 mL  10 mL Oral QID    enoxparin  60 mg Subcutaneous Q12H (prophylaxis, 0900/2100)    gabapentin  600 mg Oral Q8H    hydrALAZINE  50 mg Oral Q8H    levetiracetam  1,000 mg Oral BID    polyethylene glycol  17 g Oral Daily    senna-docusate 8.6-50 mg  1 tablet Oral BID     PRN Meds:  Current Facility-Administered Medications:     acetaminophen, 650 mg, Oral, Q4H PRN    bisacodyL, 10 mg, Rectal, Daily PRN    [COMPLETED] calcium gluconate IVPB, 1 g, Intravenous, Once **AND** calcium gluconate IVPB, 1 g, Intravenous, Q10 Min PRN    hydrALAZINE, 10  mg, Intravenous, Q4H PRN    labetalol, 10 mg, Intravenous, Q4H PRN    morphine, 2 mg, Intravenous, Q6H PRN    naloxone, 0.4 mg, Intravenous, PRN    ondansetron, 4 mg, Intravenous, Q12H PRN    oxyCODONE, 5 mg, Oral, Q6H PRN    sodium chloride 0.9%, 10 mL, Intravenous, PRN     Objective:     Weight: (!) 147 kg (324 lb 1.2 oz)  Body mass index is 52.31 kg/m².  Vital Signs (Most Recent):  Temp: 97.5 °F (36.4 °C) (12/15/24 0713)  Pulse: (!) 37 (12/15/24 0753)  Resp: 18 (12/15/24 0713)  BP: (!) 142/63 (12/15/24 0713)  SpO2: 97 % (12/15/24 0713) Vital Signs (24h Range):  Temp:  [97.4 °F (36.3 °C)-98.6 °F (37 °C)] 97.5 °F (36.4 °C)  Pulse:  [] 37  Resp:  [17-20] 18  SpO2:  [96 %-100 %] 97 %  BP: (120-142)/(52-63) 142/63                         Female External Urinary Catheter w/ Suction 11/21/24 1400 (Active)   Skin no redness;no breakdown 12/11/24 0715   Tolerance no signs/symptoms of discomfort 12/11/24 0715   Suction Continuous suction at 60 mmHg 12/11/24 0715   Date of last wick change 12/11/24 12/11/24 0715   Time of last wick change 1000 12/08/24 0800   Output (mL) 550 mL 12/11/24 1556     Neurosurgery Physical Exam  General: well developed, well nourished, no distress.   Head: normocephalic, cranial incision c/d/I with chromic suture  Mental Status: Awake, Alert, Oriented x 2  Speech: Clear with content appropriate to conversation, mild aphasia  Cranial nerves: face symmetric, CN II-XII grossly intact.   Eyes: pupils equal, round, reactive to light, EOMI.  Sensory: intact to light touch throughout  Motor Strength: Moves all extremities spontaneously with good tone. At least antigravity strength in upper and lower extremities. No focal weakness identified. No abnormal movements seen.   Follows commands x 4 extremities  Skin: Skin is warm, dry and intact.       Significant Labs:  Recent Labs   Lab 12/14/24  0844 12/14/24  1830 12/15/24  0251    111* 97    139 139   K 5.1  5.1 4.7 4.5   * 113*  "114*   CO2 18* 19* 18*   BUN 30* 32* 33*   CREATININE 0.7 0.8 0.8   CALCIUM 10.3 10.4 10.3   MG  --   --  1.9     Recent Labs   Lab 12/15/24  0251   WBC 7.37   HGB 9.9*   HCT 31.8*        No results for input(s): "LABPT", "INR", "APTT" in the last 48 hours.  Microbiology Results (last 7 days)       ** No results found for the last 168 hours. **          All pertinent labs from the last 24 hours have been reviewed.    Significant Diagnostics:  I have reviewed and interpreted all pertinent imaging results/findings within the past 24 hours.  No results found in the last 24 hours.    Assessment/Plan:     * Cerebral aneurysm  Isabel Coats is a 73-year-old female past medical history of hypertension, obstructive sleep apnea on CPAP, suggestive heart failure, obesity, history of gastric cancer status post chemoradiation and DVT in currently on Eliquis. She presents to [facility] s/p L pterional crani on 11/20 with 7 clips applied in proximity to large irregular L MCA bifurcation aneurysm and other adjacent small aneurysms, 1 clip applied across neck of aneurysm just proximal to anterior choroidal.  After surgery she seemed to be gradually waking up appropriately overnight, then AM of POD1 around shift change had an unexplained decline in exam during which stat CT showed IPH in the right side of her cerebellum. She became less responsive as well as developed a L gaze preference and dense aphasia. She had a perfusion scan with questionable perfusion deficit to the left anterior temporal lobe and milrinone was started with seemingly some clinical improvement. Now gaze preference resolved, following commands x4 antigravity, able to state name but still remains aphasic.    Imaging:  CTA Head 11/20: expected post-op changes  CTH 11/21 8 AM: right sided cerebellar IPH  CTA 11/21 10 AM: stable IPH, possible frontal contusion  CTP 11/21 4 PM: possible left anterior temporal perfusion deficit in territory of branches distal to " MCA clips   MRI brain w wo 11/22: no strokes, post op changes, stable cerebellar bleed. some edema in right frontal lobe.    Plans:  -Admitted to NSGY floor, q4h nc/vs  -SBP <160  -drain removed 11/25  -keppra 1g BID postop. cEEG negative during initial AMS POD1  -Cerebral vasospasm: weaned off milrinone prior to step down  - Daily TCDs completed  - Keep euvolemic.   -PT/OT/OOB  -PM&R consulted  -SLP - tolerating current diet but with poor intake. Continue SLP. Encourage PO fluids since NGT removed 11/28. Diet advanced to regular diet.   -Continue nutritional support with boost supplementation  -Continue to monitor clinically, notify NSGY immediately with any changes in neuro status   -SQH for DVT ppx    Dispo: pending auth rehab      Hyperkalemia  The patients most recent potassium results are listed below.  Recent Labs     12/12/24  2201 12/13/24  0335 12/13/24  0903   K 5.2*  5.2* 5.2*  5.3*  5.3*  5.2*  5.2* 5.3*  5.3*     Plan  - Monitor for arrhythmias with EKG and/or continuous telemetry.   - Hospital Medicine managing hyperkalemia              Oral thrush  - Dukes solution ordered QID  - Continue oral care     Abscess  Small R inner thigh abscess noted with seropurulent drainage.  Gen Surg consulted 11/29:  This is spontaneously and adequately draining. No significant fluctuance or induration.     -No surgical intervention indicated or recommended at this time as this area is draining well  -CTM. Wound Care following.  -Bactrim course completed    Pure hypercholesterolemia  - home lipitor 20       Chronic diastolic congestive heart failure    Latest ECHO  Results for orders placed during the hospital encounter of 10/22/24    Echo    Interpretation Summary    Left Ventricle: The left ventricle is normal in size. Normal wall thickness. There is normal systolic function with a visually estimated ejection fraction of 60 - 65%. There is normal diastolic function.    Right Ventricle: Normal right ventricular  cavity size. Wall thickness is normal. Systolic function is normal.    Left Atrium: Left atrium is severely dilated.    Right Atrium: Right atrium is dilated.    Aortic Valve: The aortic valve is a trileaflet valve. There is moderate aortic valve sclerosis. Mildly restricted motion.    Tricuspid Valve: There is mild regurgitation.    IVC/SVC: Normal venous pressure at 3 mmHg.    Current Heart Failure Medications  hydrALAZINE injection 10 mg, Every 4 hours PRN, Intravenous  labetalol 20 mg/4 mL (5 mg/mL) IV syring, Every 4 hours PRN, Intravenous  hydrALAZINE tablet 50 mg, Every 8 hours, Oral    Plan  - Monitor strict I&Os every 4 hours and daily weights.    - On telemetry  - The patient's volume status is stable        Sam Santiago MD  Neurosurgery  Dario Glover - Neurosurgery ([facility])

## 2024-12-15 NOTE — PLAN OF CARE
Chart reviewed via HM consult team.     Patient HDS. Pending placement.      - No further recommendations at this time.   - We will continue to review chart, please do not hesitate to reach out if any questions/concerns.     Josemanuel Lam MD  Internal Medicine  [facility]

## 2024-12-16 ENCOUNTER — TELEPHONE (OUTPATIENT)
Dept: INTERNAL MEDICINE | Facility: CLINIC | Age: 74
End: 2024-12-16
Payer: MEDICARE

## 2024-12-16 VITALS
DIASTOLIC BLOOD PRESSURE: 60 MMHG | HEIGHT: 66 IN | TEMPERATURE: 98 F | SYSTOLIC BLOOD PRESSURE: 157 MMHG | BODY MASS INDEX: 47.09 KG/M2 | HEART RATE: 70 BPM | RESPIRATION RATE: 18 BRPM | OXYGEN SATURATION: 95 % | WEIGHT: 293 LBS

## 2024-12-16 LAB
ALDOST SERPL-MCNC: 5.9 NG/DL
ANION GAP SERPL CALC-SCNC: 7 MMOL/L (ref 8–16)
BUN SERPL-MCNC: 36 MG/DL (ref 8–23)
CALCIUM SERPL-MCNC: 10.4 MG/DL (ref 8.7–10.5)
CHLORIDE SERPL-SCNC: 116 MMOL/L (ref 95–110)
CO2 SERPL-SCNC: 17 MMOL/L (ref 23–29)
CREAT SERPL-MCNC: 0.9 MG/DL (ref 0.5–1.4)
EST. GFR  (NO RACE VARIABLE): >60 ML/MIN/1.73 M^2
GLUCOSE SERPL-MCNC: 98 MG/DL (ref 70–110)
PATH REV BLD -IMP: NORMAL
POTASSIUM SERPL-SCNC: 5.1 MMOL/L (ref 3.5–5.1)
SODIUM SERPL-SCNC: 140 MMOL/L (ref 136–145)

## 2024-12-16 PROCEDURE — 25000003 PHARM REV CODE 250: Mod: HCNC

## 2024-12-16 PROCEDURE — 63600175 PHARM REV CODE 636 W HCPCS: Mod: HCNC

## 2024-12-16 PROCEDURE — 36415 COLL VENOUS BLD VENIPUNCTURE: CPT | Mod: HCNC

## 2024-12-16 PROCEDURE — 25000003 PHARM REV CODE 250: Mod: HCNC | Performed by: STUDENT IN AN ORGANIZED HEALTH CARE EDUCATION/TRAINING PROGRAM

## 2024-12-16 PROCEDURE — 25000003 PHARM REV CODE 250: Mod: HCNC | Performed by: PHYSICIAN ASSISTANT

## 2024-12-16 PROCEDURE — 99024 POSTOP FOLLOW-UP VISIT: CPT | Mod: HCNC,,, | Performed by: PHYSICIAN ASSISTANT

## 2024-12-16 PROCEDURE — 80048 BASIC METABOLIC PNL TOTAL CA: CPT | Mod: HCNC

## 2024-12-16 PROCEDURE — 97530 THERAPEUTIC ACTIVITIES: CPT | Mod: HCNC

## 2024-12-16 RX ORDER — HYDRALAZINE HYDROCHLORIDE 50 MG/1
50 TABLET, FILM COATED ORAL EVERY 8 HOURS
Start: 2024-12-16 | End: 2025-03-16

## 2024-12-16 RX ORDER — BISACODYL 10 MG/1
10 SUPPOSITORY RECTAL DAILY PRN
Start: 2024-12-16

## 2024-12-16 RX ORDER — ENOXAPARIN SODIUM 100 MG/ML
60 INJECTION SUBCUTANEOUS EVERY 12 HOURS
Start: 2024-12-16

## 2024-12-16 RX ORDER — AMOXICILLIN 250 MG
1 CAPSULE ORAL 2 TIMES DAILY
Start: 2024-12-16

## 2024-12-16 RX ORDER — LEVETIRACETAM 100 MG/ML
1000 SOLUTION ORAL 2 TIMES DAILY
Qty: 600 ML | Refills: 2 | Status: SHIPPED | OUTPATIENT
Start: 2024-12-16 | End: 2025-03-16

## 2024-12-16 RX ORDER — POLYETHYLENE GLYCOL 3350 17 G/17G
17 POWDER, FOR SOLUTION ORAL DAILY
Start: 2024-12-17

## 2024-12-16 RX ADMIN — HYDRALAZINE HYDROCHLORIDE 50 MG: 25 TABLET ORAL at 06:12

## 2024-12-16 RX ADMIN — ALUMINUM HYDROXIDE, MAGNESIUM HYDROXIDE, AND DIMETHICONE 10 ML: 400; 400; 40 SUSPENSION ORAL at 01:12

## 2024-12-16 RX ADMIN — SENNOSIDES AND DOCUSATE SODIUM 1 TABLET: 50; 8.6 TABLET ORAL at 08:12

## 2024-12-16 RX ADMIN — CITALOPRAM HYDROBROMIDE 10 MG: 10 TABLET ORAL at 08:12

## 2024-12-16 RX ADMIN — ENOXAPARIN SODIUM 60 MG: 60 INJECTION SUBCUTANEOUS at 08:12

## 2024-12-16 RX ADMIN — POLYETHYLENE GLYCOL 3350 17 G: 17 POWDER, FOR SOLUTION ORAL at 08:12

## 2024-12-16 RX ADMIN — ALUMINUM HYDROXIDE, MAGNESIUM HYDROXIDE, AND DIMETHICONE 10 ML: 400; 400; 40 SUSPENSION ORAL at 08:12

## 2024-12-16 RX ADMIN — ATORVASTATIN CALCIUM 20 MG: 20 TABLET, FILM COATED ORAL at 08:12

## 2024-12-16 RX ADMIN — GABAPENTIN 600 MG: 250 SOLUTION ORAL at 06:12

## 2024-12-16 RX ADMIN — LEVETIRACETAM 1000 MG: 500 SOLUTION ORAL at 08:12

## 2024-12-16 RX ADMIN — SODIUM ZIRCONIUM CYCLOSILICATE 5 G: 5 POWDER, FOR SUSPENSION ORAL at 08:12

## 2024-12-16 NOTE — PROGRESS NOTES
Dario Glover - Neurosurgery ([facility])  [facility] Medicine  Progress Note    Patient Name: Ca Coats  MRN: [id number]  Patient Class: IP- Inpatient   Admission Date: 11/20/2024  Length of Stay: 26 days  Attending Physician: Kaushal Dasilva DO  Primary Care Provider: Lei Garcia MD        Subjective     Principal Problem:Cerebral aneurysm        HPI:  73F w/ PMHx s/f HTN, LAURA (CPAP qhs), heart failure per chart (10/22/24 Echo w/ EF 60-65% w/ L+R ventricle normal systolic and diastolic function; moderate aortic sclerosis, severely dilated L. atrium, dilated R. atrium, mild tricuspid regurg), obesity, hx gastric cancer s/p chemoradiation, and DVT (Eliquis). Initially referred to neurology for dizziness workup; originally complaining of vertigo multiple times a day for the past several months, associated with shortness of breath, palpitation, nausea, headache, and feeling faint.     Now s/p Left Crani w/ several clips applied for cerebral aneurysm on 11/20/2024. 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 She became less responsive as well as developed a L gaze preference and dense aphasia. She had a perfusion scan with questionable perfusion deficit to the left anterior temporal lobe and milrinone was started with some clinical improvement as per chart review. Now gaze preference resolved, following commands x4 antigravity, able to state name but still remains aphasic.     Hospital medicine consulted for YOUNG (Cr 1.6 -> 1.2 -> 0.9 s/p fluids; baseline 0.8-1.0). On history, she is reluctant to drink fluids. On initial chart review, potassium very elevated, now down-trending (5.3 -> 5.9 -> 5.2).    Overview/Hospital Course:  No notes on file    Interval History: No events overnight. Patient seen working with PT this morning. BP mildly soft this AM, hold parameters added to  hydralazine. Ordered another dose of lokelma today for a K of 5.1. Cr stable. Pending dispo.    Review of Systems  Objective:     Vital Signs (Most Recent):  Temp: 97.4 °F (36.3 °C) (12/16/24 0743)  Pulse: 86 (12/16/24 0743)  Resp: 18 (12/16/24 0743)  BP: (!) 112/56 (12/16/24 0743)  SpO2: 98 % (12/16/24 0743) Vital Signs (24h Range):  Temp:  [97.4 °F (36.3 °C)-98.6 °F (37 °C)] 97.4 °F (36.3 °C)  Pulse:  [74-95] 86  Resp:  [17-18] 18  SpO2:  [95 %-99 %] 98 %  BP: (111-127)/(55-78) 112/56     Weight: (!) 147 kg (324 lb 1.2 oz)  Body mass index is 52.31 kg/m².    Intake/Output Summary (Last 24 hours) at 12/16/2024 0831  Last data filed at 12/16/2024 0509  Gross per 24 hour   Intake --   Output 1280 ml   Net -1280 ml         Physical Exam  Vitals and nursing note reviewed.   Constitutional:       General: She is not in acute distress.     Appearance: Normal appearance. She is not ill-appearing.   HENT:      Head: Normocephalic and atraumatic.   Cardiovascular:      Rate and Rhythm: Normal rate.   Pulmonary:      Effort: Pulmonary effort is normal. No respiratory distress.   Musculoskeletal:         General: Normal range of motion.   Skin:     General: Skin is warm and dry.   Neurological:      Mental Status: She is alert.      Motor: Weakness present.             Significant Labs: All pertinent labs within the past 24 hours have been reviewed.    Significant Imaging: I have reviewed all pertinent imaging results/findings within the past 24 hours.    Assessment and Plan     * Cerebral aneurysm  PLAN:    Management per primary.      Hyperkalemia  Likely 2/2 YOUNG, resolved  Without EKG changes. Findings not concerning for hyperkalemic emergency    PLAN:    Monitor for arrhythmias with EKG and/or continuous telemetry.   Lokelma prn   Encourage adequate fluid intake    Oral thrush  PLAN:    Continue Velazquez's solution (contains nystatin).      Chronic anticoagulation  Sept 2021 - Thrombosis of the right popliteal, posterior  tibial, and peroneal veins. No prior PE.   Associated with reduced mobility from getting Chemo, Radiation at that time - considered to be provoked DVT however was continued due to underlying cancer, reduced mobility, and tobacco use.   On Anticoagulation - Apixaban       No Afib     PLAN:  - Currently on lovenox, transition back to home Eliquis dose in preparation for discharge if agreeable per primary team      Hypercoagulable state        Neuropathy  Per chart review, 01/18/24:    She has tingling numbness of the hands  She has not a diabetic and does not drink alcohol excessively  She has not known to have cervical spine problems  She has no balance problems, problems with small objects or dropping things  She has not known to have carpal tunnel   She is on gabapentin that is helping    PLAN:    Continue gabapentin.   If YOUNG recurs or worsens, renally dose gabapentin     Gastric adenocarcinoma  Per chart review:     Patient was diagnosed in 2021 with T1 gastric adenocarcinoma sp ESD. She received 4 cycles of FLOT 7 - 9/2021 but was not felt to be a surgical candidate after chemotherapy due to comorbidities. Patient subsequently treated with chemoradiation 2-3/2022. She follows with Dr. Brown, most recent surveillance EGD without evidence of recurrence. Last CT CAP with Dr. Pelaez 11/2023.    PLAN:    Management out-pt.    Tobacco abuse  Was Using Nicotine patch as needed when she has the urge to smoking. Has not needed this patch during this admission.    CT findings s/f findings suggestive of  COPD, in context of smoking.    Per chart review 01/18/24 PCP Office Visit:    Quit Smoking > 3 months     I suggested to consider stopping  smoking tobacco for its benefits in the jailene operative period and in the long term.  I Informed about risk of wound healing problem ,infection,lung complications,thrombosis with tobacco use.  Suggested quitting tobacco.  Offered tobacco cessation service.  Not known to have COPD,  "Bronchitis, Emphysema, wheezing , chronic phlegm.  No inhaler, oxygen use.    PLAN:    Has not required nicotine patch this admission; continue to defer.    Pure hypercholesterolemia  PLAN:    On home Atorvastatin 20mg qD.      Chronic diastolic congestive heart failure  Latest ECHO  Results for orders placed during the hospital encounter of 10/22/24    Echo    Interpretation Summary    Left Ventricle: The left ventricle is normal in size. Normal wall thickness. There is normal systolic function with a visually estimated ejection fraction of 60 - 65%. There is normal diastolic function.    Right Ventricle: Normal right ventricular cavity size. Wall thickness is normal. Systolic function is normal.    Left Atrium: Left atrium is severely dilated.    Right Atrium: Right atrium is dilated.    Aortic Valve: The aortic valve is a trileaflet valve. There is moderate aortic valve sclerosis. Mildly restricted motion.    Tricuspid Valve: There is mild regurgitation.    IVC/SVC: Normal venous pressure at 3 mmHg.    Per cardiology note 11/15/24:    "Currently not having anginal symptoms and had a negative LHC in 2021. Stress echo and SPECT likely will not add information due to body habitus and PET will not be available prior to scheduled urgent surgery. She is probably at a moderate risk for cardiovascular events in a low to intermediate cardiac risk surgery. Recommend proceeding without any further cardiac testing. Discussed risks and benefits with patients and she is in agreement. "     Current Heart Failure Medications  hydrALAZINE injection 10 mg, Every 4 hours PRN, Intravenous  labetalol 20 mg/4 mL (5 mg/mL) IV syring, Every 4 hours PRN, Intravenous  hydrALAZINE tablet 50 mg, Every 8 hours, Oral    PLAN:    Latest Echo (10/22/24, as above) does not appear c/f HFpEF; however, chart review is s/f this diagnosis.  Monitor strict I&Os every 4 hours and daily weights.    On telemetry.    Hypothyroidism  No history of " treatment found on chart review.     TSH (01/18/24) - 0.280  Free T4 (01/18/24) - 0.99     Findings as above s/f subclinical hypothyroidism.    PLAN:    Outpatient PCP f/u for monitoring and management.      Debility  Recommendations, per chart review:    -  Encourage mobility, OOB in chair, and early ambulation as appropriate  -  PT/OT evaluate and treat  -  Pain management  -  Monitor for and prevent skin breakdown and pressure ulcers  Early mobility, repositioning/weight shifting every 20-30 minutes when sitting, turn patient every 2 hours, proper mattress/overlay and chair cushioning, pressure relief/heel protector boots     PLAN:    Management per primary; agree with plan as above.  PT/OT on board.     Gait instability  PLAN:    PT/OT on board; management per primary.      Severe obesity (BMI >= 40)  Body mass index is 52.31 kg/m². Morbid obesity complicates all aspects of disease management from diagnostic modalities to treatment. Weight loss encouraged and health benefits explained to patient.         Essential hypertension  Per Primary:    -Keep SBP <160.  -Hydralazine 50mg q8.  -Lisinopril held in setting of YOUNG (previously on 40mg qD).    PLAN:    Agree with plan as above.     Obstructive sleep apnea  On nightly CPAP at home; she is unsure of home settings.     PLAN:    Continue CPAP qhs if patient's functional status is not a barrier to safe use.  Caution with usage of medications that may cause respiratory suppression in the perioperative period.  Caution with patient functional status.      VTE Risk Mitigation (From admission, onward)           Ordered     enoxaparin injection 60 mg  Every 12 hours         12/13/24 1136     IP VTE HIGH RISK PATIENT  Once         11/20/24 2045     Place sequential compression device  Until discontinued         11/20/24 2045                    Discharge Planning   ZAIDA: 12/18/2024     Code Status: Full Code   Medical Readiness for Discharge Date: 12/10/2024  Discharge Plan  A: Skilled Nursing Facility   Discharge Delays: (!) Post-Acute Set-up            Please place Justification for DME        Denise Paris DO  Department of Hospital Medicine   Dario Glover - Neurosurgery ([facility])

## 2024-12-16 NOTE — PT/OT/SLP PROGRESS
Physical Therapy Treatment    Patient Name:  Ca Coats   MRN:  [id number]    Recent Surgery: Procedure(s) (LRB):  CRANIOTOMY, WITH ANEURYSM CLIPPING W/ STEALTH (Left)  CRANIOTOMY, FOR ANEURYSM OF VERTEBROBASILAR OR CAROTID CIRCULATION (Left)  DISSECTION, NERVE, USING OPERATING MICROSCOPE (Left) 26 Days Post-Op    Therapy tech utilized during this treatment due to patient complexity and physical assistance required to ensure safe mobilization     Recommendations:     Discharge Recommendations:    High intensity therapy  Discharge Equipment Recommendations: lift device, hospital bed, wheelchair   Barriers to discharge: Increased level of assist    Highest Level of Mobility: STS  Assistance Required: Mod(A) w/ RW    Assessment:     Ca Coats is a 73 y.o. female admitted with a medical diagnosis of Cerebral aneurysm.    Pt met with HOB elevated and agreeable to PT treatment. Today's PT treatment focus was on continued transfer training to improve function. Pt has progressed to requiring 1 person assist for STS with RW. In standing, pt had an episode of urinary incontinence not caught by PureWick. After episode of urinary incontinence, pt became emotional and refused additional mobility. Writing therapist attempted to comfort pt and performed pericare/linen change, however pt continued to decline ambulation attempt.     Pt is progressing towards acute PT goals appropriately and continues to benefit from acute PT sessions.     Rehab Prognosis: Good; patient would benefit from acute skilled PT services to address these deficits and reach maximum level of function.      Plan:     During this hospitalization, patient to be seen 4 x/week to address the identified rehab impairments via gait training, therapeutic activities, therapeutic exercises, neuromuscular re-education and progress toward the following goals:    Plan of Care Expires:  12/24/24    This plan of care has been discussed with the  patient/caregiver, who was included in its development and is in agreement with the identified goals and treatment plan.     Subjective     Communicated with RN prior to session.  Patient agreeable to participate.     Pain/Comfort:  Pain Rating 1: 0/10  Pain Rating Post-Intervention 1: 0/10    Chief Complaint: Cerebral aneurysm   Patient/Family Comments/goals: None stated      Objective:     Patient found HOB elevated with bed alarm, telemetry, PureWick  upon PT entry to room.    General Precautions: Standard, fall   Orthopedic Precautions:N/A   Braces: N/A         Exams:    Cognition:  Patient is oriented to Person, Place, Time  Therapist oriented pt to situation  Follows one-step commands   Insight to deficits/safety awareness: impaired    Functional Mobility:    Bed Mobility:  Supine to Sit: Maximum Assistance on R side of bed  Sit to Supine: Maximum Assistance  Scooting anteriorly to EOB to plant feet on floor: Moderate Assistance    Transfers:   Sit to Stand Transfer: Moderate Assistance  from EOB with RW   PT provided tactile cues to facilitate hip extension and forward gaze  Pt had an episode of urinary incontinence in standing             Gait:  Patient refused after an episode of urinary incontinence    Balance:  Static Sit:   Contact-Guard Assist at EOB  Static Stand:   Mod Assist with Rolling walker    Therapeutic Activities/Exercises     Patient assisted with functional mobility as noted above  STS from EOB  PT assisted with pericare after an episode of urinary incontinence   Patient educated on the importance of early mobility to prevent functional decline during hospital stay  Patient was instructed to utilize staff assistance for mobility/transfers.  Patient is appropriate to transfer with mod(A) stand pivot w/ RW and RN/PCT assist  Patient educated on PT POC and role of PT in acute care  White board updated regarding patient's safest level of mobility with staff assistance, RN also updated.      AM-PAC 6 CLICK MOBILITY  Turning over in bed (including adjusting bedclothes, sheets and blankets)?: 2  Sitting down on and standing up from a chair with arms (e.g., wheelchair, bedside commode, etc.): 2  Moving from lying on back to sitting on the side of the bed?: 2  Moving to and from a bed to a chair (including a wheelchair)?: 2  Need to walk in hospital room?: 2  Climbing 3-5 steps with a railing?: 1  Basic Mobility Total Score: 11     Patient left HOB elevated with all lines intact, call button in reach, bed alarm on, and rn notified.        History/Goals:     PAST MEDICAL HISTORY:  Past Medical History:   Diagnosis Date    YOUNG (acute kidney injury) 10/15/2021    Anemia     2018    Arthritis     BMI 60.0-69.9, adult     Cataract     Chronic anticoagulation 11/27/2024    DVT in currently on Eliquis     Chronic diastolic congestive heart failure 01/27/2021    Coronary artery disease involving native coronary artery of native heart without angina pectoris 11/15/2024    Deep vein thrombosis     Depression     Dry eye syndrome     DVT of deep femoral vein, right 05/29/2023    Gastric adenocarcinoma 05/25/2021    GERD (gastroesophageal reflux disease)     Glaucoma suspect     History of gastric ulcer     History of psychiatric hospitalization     Hx of psychiatric care     Celexa, no recall of charted Effexor, Lexapro, Desyrel prescriptions    Hyperlipidemia     Hypertension     Hypothyroidism     Morbid obesity     Neuromuscular disorder     Sleep apnea     Thyroid disease        Past Surgical History:   Procedure Laterality Date    APPENDECTOMY      CATARACT EXTRACTION W/  INTRAOCULAR LENS IMPLANT Bilateral     MFIOL OU    CLIP LIGATION OF INTRACRANIAL ANEURYSM BY CRANIOTOMY Left 11/20/2024    Procedure: CRANIOTOMY, WITH ANEURYSM CLIPPING W/ STEALTH;  Surgeon: Sejal Simon MD;  Location: [facility] OR [address] [place] [address];  Service: Neurosurgery;  Laterality: Left;  left pterioral craniotomy with microsurgical clip  ligation of mca and anterior chroidal aneurysm, dr. ginna hopkins co surgeon, scalp block, type and cross 2 units, neuromonitoring sep,mep,eeg, regular bed, desouza, supine spencer, icu pos    CORONARY ANGIOGRAPHY Bilateral 02/24/2021    Procedure: ANGIOGRAM, CORONARY ARTERY;  Surgeon: Cresencio Ridley MD;  Location: [facility] CATH LAB;  Service: Cardiology;  Laterality: Bilateral;  Covid test needed - ok per Danay SSCU. Pt. w/o transportation or family    CRANIOTOMY, FOR ANEURYSM OF VERTEBROBASILAR OR CAROTID CIRCULATION Left 11/20/2024    Procedure: CRANIOTOMY, FOR ANEURYSM OF VERTEBROBASILAR OR CAROTID CIRCULATION;  Surgeon: Sejal Simon MD;  Location: [facility] OR [address] [place] [address];  Service: Neurosurgery;  Laterality: Left;  with scalp block    DISSECTION, NERVE, USING OPERATING MICROSCOPE Left 11/20/2024    Procedure: DISSECTION, NERVE, USING OPERATING MICROSCOPE;  Surgeon: Sejal Simon MD;  [facility];  Service: Neurosurgery;  Laterality: Left;  with scalp block    ENDOSCOPIC ULTRASOUND OF UPPER GASTROINTESTINAL TRACT N/A 03/17/2021    Procedure: ULTRASOUND, UPPER GI TRACT, ENDOSCOPIC;  Surgeon: Gerald Anaya MD;  Location: [facility] ([address] [place] [address]);  Service: Endoscopy;  Laterality: N/A;  Pt unable to get a ride for swab. Will do rapid test at 8:30 - ttr    ENDOSCOPIC ULTRASOUND OF UPPER GASTROINTESTINAL TRACT N/A 01/13/2022    Procedure: ULTRASOUND, UPPER GI TRACT, ENDOSCOPIC;  Surgeon: Kevin Carballo MD;  [facility]  Service: Endoscopy;  Laterality: N/A;  blood thinner approval received, see telephone encounter 1/7/22-BB  rapid  instructions given verbally and sent to address on file in pt's chart-BB    ENDOSCOPIC ULTRASOUND OF UPPER GASTROINTESTINAL TRACT N/A 10/11/2022    Procedure: ULTRASOUND, UPPER GI TRACT, ENDOSCOPIC;  Surgeon: Dequan Ridley MD;  Location: [facility] ENDO (2ND FLR);  Service: Endoscopy;  Laterality: N/A;    ENDOSCOPIC ULTRASOUND OF UPPER GASTROINTESTINAL  TRACT N/A 01/24/2024    Procedure: ULTRASOUND, UPPER GI TRACT, ENDOSCOPIC;  Surgeon: Kevin Carballo MD;  Location: [facility] (2ND FLR);  Service: Endoscopy;  Laterality: N/A;    ENDOSCOPIC ULTRASOUND OF UPPER GASTROINTESTINAL TRACT N/A 03/05/2024    Procedure: ULTRASOUND, UPPER GI TRACT, ENDOSCOPIC;  Surgeon: Kevin Carballo MD;  Location: [facility] ENDO (2ND FLR);  Service: Endoscopy;  Laterality: N/A;  1/25 portal instr-Repeat theEUS at the next available appointment because the preparation was poor. 48hrs of liquid. Ozempic 7 day hold-eliquis approved Dr. Pelaez TE 12/12/2023-tt  2/28-precall complete-pt verbalized udnerstanding of holding Oz    ESOPHAGOGASTRODUODENOSCOPY N/A 02/05/2021    Procedure: EGD (ESOPHAGOGASTRODUODENOSCOPY);  Surgeon: Matthew Hernadez MD;  Location: [facility] (2ND FLR);  Service: Endoscopy;  Laterality: N/A;  BMI-58    Wt: 361#     COVID test at [facility] on 2/2-GT    ESOPHAGOGASTRODUODENOSCOPY N/A 03/17/2021    Procedure: EGD (ESOPHAGOGASTRODUODENOSCOPY);  Surgeon: Gerald Anaya MD;  Location: [facility] (2ND FLR);  Service: Endoscopy;  Laterality: N/A;    ESOPHAGOGASTRODUODENOSCOPY N/A 05/25/2021    Procedure: EGD (ESOPHAGOGASTRODUODENOSCOPY);  Surgeon: Kevin Carballo MD;  Location: [facility] (2ND FLR);  Service: Endoscopy;  Laterality: N/A;  ESD 2 hours  Full COVID vaccination on file. ttr    ESOPHAGOGASTRODUODENOSCOPY N/A 01/13/2022    Procedure: EGD (ESOPHAGOGASTRODUODENOSCOPY);  Surgeon: Kevin Carballo MD;  Location: [facility] ENDO (2ND FLR);  Service: Endoscopy;  Laterality: N/A;  blood thinner approval, see telephone encounter 1/7/22-BB  rapid  instructions given verbally and sent to address on file in pt's chart-BB    ESOPHAGOGASTRODUODENOSCOPY N/A 10/11/2022    Procedure: EGD (ESOPHAGOGASTRODUODENOSCOPY);  Surgeon: Dequan Ridley MD;  Location: [facility] ([address] [place] [address]);  Service: Endoscopy;  Laterality: N/A;  She is do for EGD/EUS now. Personal history of gastric cancer. Ca  [facility] #[id number].   Thanks,   Kevin Fuentes MD    Pt is fully vaccinated-DS  9/14/22-Approval to hold Eliquis rec'd from Dr. Pelaez (see telephone encounter 9/14/22)-DS  9/14/22-Ins    ESOPHAGOGASTRODUODENOSCOPY N/A 01/24/2024    Procedure: EGD (ESOPHAGOGASTRODUODENOSCOPY);  Surgeon: Kevin Carballo MD;  Location: [facility] ENDO (2ND FLR);  Service: Endoscopy;  Laterality: N/A;  12/8/23: instructions sent via portal. eliquis approval from MD Pelaez in telephone encounter (12/12/23) -GD  1/9-precall complete-MS    ESOPHAGOGASTRODUODENOSCOPY N/A 03/05/2024    Procedure: EGD (ESOPHAGOGASTRODUODENOSCOPY);  Surgeon: Kevin Carballo MD;  Location: [facility] ENDO (2ND FLR);  Service: Endoscopy;  Laterality: N/A;    EYE SURGERY      HYSTEROSCOPIC POLYPECTOMY OF UTERUS  01/10/2024    Procedure: POLYPECTOMY, UTERUS, HYSTEROSCOPIC;  Surgeon: Pina Pickens MD;  [facility];  Service: OB/GYN;;    HYSTEROSCOPY WITH DILATION AND CURETTAGE OF UTERUS N/A 01/10/2024    Procedure: HYSTEROSCOPY, WITH DILATION AND CURETTAGE OF UTERUS;  Surgeon: Pina Pickens MD;  Location: [facility];  Service: OB/GYN;  Laterality: N/A;    INJECTION OF ANESTHETIC AGENT AROUND NERVE Left 07/10/2018    Procedure: BLOCK, NERVE;  Surgeon: Samantha Vidal MD;  Location: [facility];  Service: Pain Management;  Laterality: Left;  Genicular     INJECTION OF ANESTHETIC AGENT AROUND NERVE Left 07/19/2019    Procedure: Block, Nerve NERVE BLOCK GENICULAR WITH PHENOL 6%;  Surgeon: Samantha Vidal MD;  Location: [facility];  Service: Pain Management;  Laterality: Left;  NEEDS CONSENT    INSERTION OF TUNNELED CENTRAL VENOUS CATHETER (CVC) WITH SUBCUTANEOUS PORT N/A 07/09/2021    Procedure: INSERTION, PORT-A-CATH;  Surgeon: Mario Paz MD;  Location: [facility];  Service: Radiology;  Laterality: N/A;  PORT PLACEMENT    RADIOFREQUENCY ABLATION Left 06/19/2020    Procedure: RADIOFREQUENCY ABLATION LEFT GENICULAR;  Surgeon: Tevin Clark,  MD;  Location: [facility] PAIN MGT;  Service: Pain Management;  Laterality: Left;  Left Genicular RFA    RADIOFREQUENCY ABLATION Left 2021    Procedure: RADIOFREQUENCY ABLATION LEFT GENICULAR;  Surgeon: Tevin Clark MD;  Location: [facility];  Service: Pain Management;  Laterality: Left;    RADIOFREQUENCY ABLATION Left 2021    Procedure: RADIOFREQUENCY ABLATION, GENICULAR COOLED NEED CONSENT;  Surgeon: Tevin Clark MD;  Location: [facility];  Service: Pain Management;  Laterality: Left;    RADIOFREQUENCY ABLATION Left 2022    Procedure: RADIOFREQUENCY ABLATION, GENICULAR COOLED , LEFT;  Surgeon: Tevin Clark MD;  Location: [facility];  Service: Pain Management;  Laterality: Left;    RADIOFREQUENCY ABLATION Left 2022    Procedure: RADIOFREQUENCY ABLATION LEFT GENICULAR COOLED CLEARED TO HOLD ELIQUIS 3 DAYS  NEEDS CONSENT;  Surgeon: Tevin Clark MD;  Location: [facility] PAIN MGT;  Service: Pain Management;  Laterality: Left;    TONSILLECTOMY         GOALS:   Multidisciplinary Problems       Physical Therapy Goals          Problem: Physical Therapy    Goal Priority Disciplines Outcome Interventions   Physical Therapy Goal     PT, PT/OT Progressing    Description: Goals to be met by: 2024     Patient will increase functional independence with mobility by performin. Supine to sit with MInimal Assistance  2. Sit to supine with Minimal Assistance  3. Sit to stand transfer with Moderate Assistance  4. Bed to chair transfer with Maximum Assistance using LRAD  5. Gait  x 5 feet with Maximum Assistance using LRAD.   6. Sitting at edge of bed x5 minutes with Stand-by Assistance  7. Lower extremity exercise program x15 reps per handout, with assistance as needed    Hospital Bed - Patient requires a hospital bed for positioning of the body in ways that are not feasible with an ordinary bed. The patient requires special positioning for pain relief, limited mobility, and/or being  unable to independently make changes in body position without the use of a hospital bed. Pillows and wedges will not be adequate for resolving these positional issues.     Wheelchair - Patient has a mobility limitation that significantly impairs their ability to participate in one or more mobility related activities of daily living in customary locations in the home. The mobility limitation cannot be sufficiently resolved by the use of a cane or walker. The use of a manual wheelchair will greatly improve the patient's ability to participate in MRADLs. The patient will use the wheelchair on a regular basis at home. They have expressed their willingness to use a manual wheelchair in the home, and have a caregiver who is available and willing to assist with the wheelchair if needed                         Time Tracking:     PT Received On: 12/16/24  PT Start Time: 0817     PT Stop Time: 0840  PT Total Time (min): 23 min     Billable Minutes: Therapeutic Activity 23      Leny Bhatia, PT  12/16/2024  Pager# [phone]

## 2024-12-16 NOTE — ASSESSMENT & PLAN NOTE
Per chart review, 01/18/24:    She has tingling numbness of the hands  She has not a diabetic and does not drink alcohol excessively  She has not known to have cervical spine problems  She has no balance problems, problems with small objects or dropping things  She has not known to have carpal tunnel   She is on gabapentin that is helping    PLAN:    Continue gabapentin.   If YOUNG recurs or worsens, renally dose gabapentin

## 2024-12-16 NOTE — PLAN OF CARE
[facility] - Neurosurgery (Hospital)  Discharge Final Note    Primary Care Provider: Lei Garcia MD    Expected Discharge Date: 12/16/2024    Patient to be discharged to [facility].  [facility] to provide stretcher transportation.  Neurosurgery clinic to schedule follow up appointment.    Nurse to call report to [phone] or [phone].  Stretcher requested for 1:30 which is not a guaranteed arrival time.    Future Appointments   Date Time Provider Department Center   12/18/2024 10:30 AM Mago Banks FNP [facility]   12/31/2024  9:15 AM [facility] OIC-CT1 500 LB LIMIT [facility]   12/31/2024 11:00 AM Sejal Simon MD [facility] NEUROS8 [facility]   1/3/2025 10:30 AM INJECTION, [facility]   6/19/2025  9:40 AM Lei Garcia MD [facility]        Final Discharge Note (most recent)       Final Note - 12/16/24 1214          Final Note    Assessment Type Final Discharge Note     Anticipated Discharge Disposition Rehab Facility        Post-Acute Status    Post-Acute Authorization Placement     Post-Acute Placement Status Set-up Complete/Auth obtained     Discharge Delays None known at this time                     Important Message from Medicare

## 2024-12-16 NOTE — ASSESSMENT & PLAN NOTE
Body mass index is 52.31 kg/m². Morbid obesity complicates all aspects of disease management from diagnostic modalities to treatment. Weight loss encouraged and health benefits explained to patient.

## 2024-12-16 NOTE — PLAN OF CARE
Problem: Adult Inpatient Plan of Care  Goal: Plan of Care Review  Outcome: Progressing     Problem: Adult Inpatient Plan of Care  Goal: Absence of Hospital-Acquired Illness or Injury  Outcome: Progressing     Problem: Adult Inpatient Plan of Care  Goal: Optimal Comfort and Wellbeing  Outcome: Progressing     Problem: Wound  Goal: Optimal Coping  Outcome: Progressing     Problem: Wound  Goal: Optimal Functional Ability  Outcome: Progressing     Problem: Wound  Goal: Improved Oral Intake  Outcome: Progressing     Problem: Stroke, Intracerebral Hemorrhage  Goal: Optimal Coping  Outcome: Progressing     Problem: Stroke, Intracerebral Hemorrhage  Goal: Effective Communication Skills  Outcome: Progressing     Problem: Stroke, Intracerebral Hemorrhage  Goal: Optimal Pain Control and Function  Outcome: Progressing     Problem: Skin Injury Risk Increased  Goal: Skin Health and Integrity  Outcome: Progressing

## 2024-12-16 NOTE — PLAN OF CARE
[facility]    FACILITY TRANSFER ORDERS      Patient Name: Ca Coats  YOB: 1950    PCP: Lei Garcia MD   PCP Address: [facility] [address] / [place] [address]  PCP Phone Number: [phone]  PCP Fax: [phone]    Encounter Date: 12/16/2024    Admit to: IPR    Vital Signs:  Routine    Diagnoses:   Active Hospital Problems    Diagnosis  POA    *Cerebral aneurysm [I67.1]  Yes    Hyperkalemia [E87.5]  No    Brain compression [G93.5]  No     Decreased attenuation of the right superior cerebellar hemorrhage, however noting some increased vasogenic edema and localized mass effect in this region.   Close neurological monitoring, stat cth with acute changes  tcds      Oral thrush [B37.0]  No    Abscess [L02.91]  No    Hypercoagulable state [D68.59]  Yes     DVT in currently on Eliquis       Chronic anticoagulation [Z79.01]  Not Applicable     DVT in currently on Eliquis       Hypernatremia [E87.0]  Yes     Monitor with daily cmp      Hypophosphatemia [E83.39]  Yes    Aphasia [R47.01]  Yes     Therapy to evaluate and treat       Nontraumatic intracerebral hemorrhage [I61.9]  No     MRI brain w wo 11/22: no strokes, post op changes, stable cerebellar bleed. some edema in right frontal lobe.       Vasogenic cerebral edema [G93.6]  No     MRI brain w wo 11/22: no strokes, post op changes, stable cerebellar bleed. some edema in right frontal lobe.       Ventricular tachycardia [I47.20]  No    Acute postoperative anemia due to expected blood loss [D62]  Yes     Ebl 300  3 point drop in hemoglobin post op  Monitor with daily cbc  Transfuse <7          SAH (subarachnoid hemorrhage) [I60.9]  No     Small volume subarachnoid and intraventricular hemorrhage noted on imaging      IVH (intraventricular hemorrhage) [I61.5]  No    Tobacco dependency [F17.200]  Yes    Neuropathy [G62.9]  Yes    Gastric adenocarcinoma [C16.9]  Yes     gastric cancer cT2 or higher      Tobacco abuse  [Z72.0]  Yes     Chronic    Chronic diastolic congestive heart failure [I50.32]  Yes     Chronic    Pure hypercholesterolemia [E78.00]  Yes     Chronic    Hypothyroidism [E03.9]  Yes    Debility [R53.81]  Yes    Gait instability [R26.81]  Yes    Essential hypertension [I10]  Yes     Chronic    Severe obesity (BMI >= 40) [E66.01]  Yes    Obstructive sleep apnea [G47.33]  Yes     Chronic      Resolved Hospital Problems    Diagnosis Date Resolved POA    Nervous [R45.0] 12/13/2024 Yes    DVT of deep femoral vein, right [I82.411] 12/09/2024 Yes    YOUNG (acute kidney injury) [N17.9] 12/13/2024 Yes    Hypokalemia [E87.6] 12/09/2024 No     Monitor with daily cmp  replaced         Allergies:Review of patient's allergies indicates:  No Known Allergies    Diet: regular diet, low potassium, thin liquids, cut food into bite sized pieces; encourage PO fluids     Activities: Activity as tolerated    Goals of Care Treatment Preferences:  Code Status: Full Code      Nursing: daily weights, fall precautions, seizure precautions, aspiration precautions    Labs: CBC and BMP  every other day for 1 week; then per protocol if labs within normal range      CONSULTS:    Physical Therapy to evaluate and treat.  and Occupational Therapy to evaluate and treat.    MISCELLANEOUS CARE:  Routine Skin for Bedridden Patients: Apply moisture barrier cream to all skin folds and wet areas in perineal area daily and after baths and all bowel movements.    WOUND CARE ORDERS  Yes: right upper thigh abscess - Clean wound to right upper anterior thigh /groin with no rinse; pat dry. Apply triad wound cream.    Medications: Review discharge medications with patient and family and provide education.         Medication List        START taking these medications      bisacodyL 10 mg Supp  Commonly known as: DULCOLAX  Place 1 suppository (10 mg total) rectally daily as needed (Until bowel movement if patient has no bowel movement for 2 days).     enoxaparin 60  mg/0.6 mL Syrg  Commonly known as: LOVENOX  Inject 0.6 mLs (60 mg total) into the skin 2 (two) times daily.     hydrALAZINE 50 MG tablet  Commonly known as: APRESOLINE  Take 1 tablet (50 mg total) by mouth every 8 (eight) hours.     levetiracetam 500 mg/5 mL (5 mL) Soln  Take 10 mLs (1,000 mg total) by mouth 2 (two) times daily.     polyethylene glycol 17 gram Pwpk  Commonly known as: GLYCOLAX  Take 17 g by mouth once daily.  Start taking on: December 17, 2024     senna-docusate 8.6-50 mg 8.6-50 mg per tablet  Commonly known as: PERICOLACE  Take 1 tablet by mouth 2 (two) times daily.            CONTINUE taking these medications      acetaminophen 500 MG tablet  Commonly known as: TYLENOL  Take by mouth daily as needed.     atorvastatin 20 MG tablet  Commonly known as: LIPITOR  Take 1 tablet (20 mg total) by mouth once daily.     B-complex with vitamin C tablet  Commonly known as: Z-Bec or Equiv  Take 1 tablet by mouth once daily.  - ok to hold while at IPR   CALCIUM CITRATE-VITAMIN D2 ORAL  Take 1 tablet by mouth once daily.  - ok to hold while at IPR   citalopram 10 MG tablet  Commonly known as: CeleXA  Take 1 tablet (10 mg total) by mouth once daily.     gabapentin 300 MG capsule  Commonly known as: NEURONTIN  Take 2 capsules (600 mg total) by mouth 3 (three) times daily.     multivitamin with minerals tablet  Take 1 tablet by mouth once daily.  - ok to hold while at IPR   OZEMPIC 1 mg/dose (4 mg/3 mL)  Generic drug: semaglutide  Inject 1 mg into the skin every 7 days.  - ok to hold while at IPR   pantoprazole 40 MG tablet  Commonly known as: PROTONIX  Take 1 tablet (40 mg total) by mouth once daily.            STOP taking these medications      furosemide 80 MG tablet  Commonly known as: LASIX   - hold while at rehab due to risk of overdiuresis/worsening dehydration   lisinopriL 40 MG tablet  Commonly known as: PRINIVIL,ZESTRIL  - hold while at rehab due to risk of hyperkalemia   oxyCODONE-acetaminophen 5-325 mg  per tablet  Commonly known as: PERCOCET                Immunizations Administered as of 12/16/2024       Name Date Dose VIS Date Route Exp Date    COVID-19, MRNA, LN-S, PF (Moderna) 12/15/2021 0.5 mL 64501 Intramuscular --    Site: Left arm     : Modern[employer]     Lot: [id number]     Comment: Adminis     COVID-19, MRNA, LN-S, PF (Moderna) 4/1/2021 0.5 mL 95556 Intramuscular --    Site: Right arm     : Modern[employer]     Lot: [id number]     Comment: Adminis     COVID-19, MRNA, LN-S, PF (Moderna) 3/4/2021 0.5 mL 56931 Intramuscular --    Site: Left arm     : Moderna [employer]     Lot: [id number]     Comment: Adminis             This patient has had both covid vaccinations    Some patients may experience side effects after vaccination.  These may include fever, headache, muscle or joint aches.  Most symptoms resolve with 24-48 hours and do not require urgent medical evaluation unless they persist for more than 72 hours or symptoms are concerning for an unrelated medical condition.          _________________________________  Virgie Peacock PA-C  12/16/2024

## 2024-12-16 NOTE — ASSESSMENT & PLAN NOTE
Per chart review:     Patient was diagnosed in 2021 with T1 gastric adenocarcinoma sp ESD. She received 4 cycles of FLOT 7 - 9/2021 but was not felt to be a surgical candidate after chemotherapy due to comorbidities. Patient subsequently treated with chemoradiation 2-3/2022. She follows with Dr. Brown, most recent surveillance EGD without evidence of recurrence. Last CT CAP with Dr. Pelaez 11/2023.    PLAN:    Management out-pt.

## 2024-12-16 NOTE — ASSESSMENT & PLAN NOTE
Recommendations, per chart review:    -  Encourage mobility, OOB in chair, and early ambulation as appropriate  -  PT/OT evaluate and treat  -  Pain management  -  Monitor for and prevent skin breakdown and pressure ulcers  Early mobility, repositioning/weight shifting every 20-30 minutes when sitting, turn patient every 2 hours, proper mattress/overlay and chair cushioning, pressure relief/heel protector boots     PLAN:    Management per primary; agree with plan as above.  PT/OT on board.

## 2024-12-16 NOTE — DISCHARGE SUMMARY
[facility] ([facility])  Neurosurgery  Discharge Summary      Patient Name: Ca Coats  MRN: [id number]  Admission Date: 11/20/2024  Hospital Length of Stay: 26 days  Discharge Date and Time:  12/16/2024 1:35 PM  Attending Physician: Sejal Simon MD   Discharging Provider: Virgie Peacock PA-C  Primary Care Provider: Lei Garcia MD    HPI:   Isabel Coats is a 73-year-old female past medical history of hypertension, obstructive sleep apnea on CPAP, suggestive heart failure, obesity, history of gastric cancer status post chemoradiation and DVT in currently on Eliquis. She presents to [facility] L pterional crani, 7 clips applied in proximity to large irregular L MCA bifurcation aneurysm and other adjacent small aneurysms, 1 clip applied across neck of aneurysm just proximal to anterior choroidal.  She also has a current smoker she was initially referred to Neurology for workup for dizziness. She had been complaining of vertigo multiple times a day for the past several months, associated with shortness of breath, palpitation, nausea, headache, and feeling faint. She is admitted to [facility] for a higher level of care.     Procedure(s) (LRB):  CRANIOTOMY, WITH ANEURYSM CLIPPING W/ STEALTH (Left)  CRANIOTOMY, FOR ANEURYSM OF VERTEBROBASILAR OR CAROTID CIRCULATION (Left)  DISSECTION, NERVE, USING OPERATING MICROSCOPE (Left)     Hospital Course: 11/21: POD1 s/p L pterional for clipping of L MCA bifurcation and ant choroidal aneurysms. NAEON. Neuro exam grossly stable. Pain control. Drain output ~225cc.  11/22: patient yesterday AM around 7:30 with exam change, developed L gaze, nonresponsive, aphasic. Stat CTH showed R cerebellar IPH. Short interval CTA 2 hours later with stable IPH, possible frontal contusion. CTP in afternoon with possible L anterior temporal decreased perfusion vs post surgical artifact. Patient placed on strict blood pressure control, cEEG, and milrinone gtt started in case of  spasm. Patient with some mild improvement with milrinone, able to say some brief phrases and gaze preference less severe. cEEG negative, removed this AM. Currently has said name and some short phrases, is now able to overcome gaze preference and look to right, following commands reliably x4. Able to count fingers. Overall exam improving but aphasia remains concerning.  11/23: frequent short runs of Vtach while on milrinone. Neuro exam stable, without major improvement. MRI yesterday with edema in the L anterior temporal lobe and L posterior inferior frontal lobe with stable IPH of the frontal lobe and R superior cerebellum. After discussion with NSGY, plan to liberalize MAP goal to 100   11/24: ZIA. Pt exam stable this morning. Pts mentation improved. Now responding to simple questions and making sense.   11/25: ZIA. Neuro exam stable. Drain output 70cc. CTM in ICU.  11/26: ZIA. Patient states name, oriented to hospital with choices. Able to count fingers, but still severely aphasic overall. NGT in place, still npo per speech recs. Stable for transfer to NSGY floor.   11/27: ZIA. Ox2, more conversant today. Asking for something to eat. SLP advanced diet to pureed w/ thin liquids. Follows commands x 4. Document strict I/O, US TCD today with evidence of mild left MCA vasospasm. Working on dispo.   11/28: speech gradually improving, able to name keys and pen and oriented x2. still has NGT, eating 75% lunch and 25% dinner per RN. I/O +150. TCDs yesterday R 2.33, L 3.46 peak velocity 129.   11/29: KARLA ROYALVSS. TCDs today pending. NGT removed overnight by patient. +245 I/O during day shift yesterday, undocumented overnight. Encourage PO fluids since NGT out. Tolerating current recommended diet per SLP. Exam stable. Pending placement.  11/30: KARLA ROYALVSS. TCD yesterday c/f L MCA/DIANE vasospasm, repeat today pending. Pt has been clinically/neurologically stable. Tolerating diet but not hydrating much, I/O -605,  500cc NS bolus ordered. Planning for IPR placement.  12/1: NAEO. Neuro exam stable. Denies headache, nausea or vomiting. TCD wnl. Tolerating diet but not hydrating much, I/O around -1.1L, 500cc NS bolus ordered. Planning for IPR placement.  12/2: NAEO. I/O with -190cc, given 250cc bolus. TCD yesterday with mild vasospasm, reviewed by Dr. Simon and thought to be overall stable. Today's TCD pending. Diet advanced by SLP to soft and bite sized. Exam stable.   12/3: NAEON. Net negative this am, 500cc bolus given. Nursing reports poor po intake. TCD yesterday with continued mild Left MCA vasospasm vs hyperemia. Pt noted to have oral thrush this am. Remains neuro stable  12/4: NAEON. I/O + 60 today. Goal euvolemic. Encourage PO intake. I/O every 4 hours. TCDs today with improvement in DIANE and MCA velocities, mild increase in right and left lindegaard ratio. Repeat TCD tomorrow. Exam stable.   12/5: NAEO. I/O - 230 overnight. Given 250 cc bolus today. Continue to monitor. TCD today pending. Slightly more drowsy on AM rounds. Repeat CTH done today, appears stable, read pending. Exam otherwise stable.   12/6: NAEO. I/O -250. Given another 250cc bolus. Remains drowsy but following all commands.   12/7: NAEO. AFVSS. Patient less drowsy on exam this AM, oriented x 2, briskly following commands, sitting up eating breakfast. Pending IPR.   12/8: Neuro exam grossly stable. Pending SNF. No acute events   12/9: NAEO. AFVSS. Denies any complaints this AM. Sitting up eating breakfast. Exam stable. Pending dispo.   12/10: NAEON. VSS. Remains neuro stable. SNF pending   12/11: NAEO. AFVSS. Pt with new YOUNG, in the setting of poor PO water intake, given a fluid bolus with plan to repeat BMP. Lisinopril held for mild hyperkalemia. Exam stable. Pending auth for IPR.   12/12: Hyperkalemia worsened on PM repeat to 5.9, decreased to 5.2 this AM. Remains on cardiac monitoring. Hospital Medicine consulted for assistance. YOUNG improving. Pt  given repeated prompts to drink water, will do so with assistance and observation. Exam stable. Pending peer to peer for rehab.   12/13: AF. [facility] Medicine following for hyperkalemia, 5.3 on most recent repeat. Pt still pending a BM. Had not been receiving the ordered suppositories. Abdomin x-ray grossly WNL. Suppository given this AM. Will continue to monitor. Exam stable. Appeal submitted for auth for IPR.   12/14: ZIA. AFVSS. Neuro stable. BM yesterday and this morning. Pending placement.  12/15: NAEON. Neuro stable. Pending placement  12/16: NAEON. AFVSS. Potassium stable, management per . Appeal for rehab approved. Plan for discharge to [facility] re[facility] today. Neuro exam stable. Patient awake, alert, oriented x 2. Following commands. Asking to eat. Called daughter x3 to provide care update, no answer. Left voicemail for return call. Able to contact [place] Santana, second number listed in the chart, for an update.     General: well developed, well nourished, no distress.   Head: normocephalic, cranial incision well healed  Mental Status: Awake, Alert, Oriented x 2  Speech: Clear with content appropriate to conversation, mild aphasia  Cranial nerves: face symmetric, CN II-XII grossly intact.   Eyes: pupils equal, round, reactive to light, EOMI.  Sensory: intact to light touch throughout  Motor Strength: Moves all extremities spontaneously with good tone. At least antigravity strength in upper and lower extremities. No focal weakness identified. No abnormal movements seen.   Follows commands x 4 extremities  Skin: Skin is warm, dry and intact.       Goals of Care Treatment Preferences:  Code Status: Full Code      Consults:   Consults (From admission, onward)          Status Ordering Provider     Inpatient consult to Hospital Medicine-General  Once        Provider:  (Not yet assigned)    Completed LUMA BRENNAN     Inpatient consult to General Surgery  Once        Provider:  (Not yet assigned)    Completed  JOSHUA HANSEN     Inpatient consult to Physical Medicine Rehab  Once        Provider:  (Not yet assigned)    Completed JANNIE VANEGAS     Inpatient consult to Social Work  Once        Provider:  (Not yet assigned)    Completed JANNIE VANEGAS     Inpatient consult to Registered Dietitian/Nutritionist  Once        Provider:  (Not yet assigned)    Completed JOSHUA HANSEN     Inpatient consult to Registered Dietitian/Nutritionist  Once        Provider:  (Not yet assigned)    Completed BERNIE LEBLANC     Inpatient consult to Registered Dietitian/Nutritionist  Once        Provider:  (Not yet assigned)    Completed HAMILTON DUNN     IP consult to case management/social work  Once        Provider:  (Not yet assigned)    Completed HAMILTON DUNN            Significant Diagnostic Studies: Labs: BMP:   Recent Labs   Lab 12/15/24  0251 12/15/24  1716 12/16/24  0454   GLU 97 104 98    140 140   K 4.5 4.8 5.1   * 114* 116*   CO2 18* 20* 17*   BUN 33* 31* 36*   CREATININE 0.8 0.8 0.9   CALCIUM 10.3 10.1 10.4   MG 1.9  --   --    , CBC   Recent Labs   Lab 12/15/24  0251   WBC 7.37   HGB 9.9*   HCT 31.8*      , and All labs within the past 24 hours have been reviewed  Radiology: CTA head and neck, CTH, CT brain perfusion, KUB, CXR, US TCD, MRI brain w/o contrast      Pending Diagnostic Studies:       Procedure Component Value Units Date/Time    Aldosterone [[id number]] Collected: 12/13/24 0903    Order Status: Sent Lab Status: In process Updated: 12/13/24 0914    Specimen: Blood     Aldosterone/Renin Activity Ratio [[id number]] Collected: 12/13/24 0903    Order Status: Sent Lab Status: In process Updated: 12/13/24 0914    Specimen: Blood     Methylmalonic acid, serum [[id number]] Collected: 12/13/24 0335    Order Status: Sent Lab Status: In process Updated: 12/13/24 0352    Specimen: Blood           Final Active Diagnoses:    Diagnosis Date Noted POA    PRINCIPAL PROBLEM:  Cerebral aneurysm [I67.1]  10/22/2024 Yes    Hyperkalemia [E87.5] 12/12/2024 No    Brain compression [G93.5] 12/06/2024 No    Oral thrush [B37.0] 12/03/2024 No    Abscess [L02.91] 11/29/2024 No    Hypercoagulable state [D68.59] 11/27/2024 Yes    Chronic anticoagulation [Z79.01] 11/27/2024 Not Applicable    Hypernatremia [E87.0] 11/27/2024 Yes    Hypophosphatemia [E83.39] 11/27/2024 Yes    Aphasia [R47.01] 11/27/2024 Yes    Nontraumatic intracerebral hemorrhage [I61.9] 11/27/2024 No    Vasogenic cerebral edema [G93.6] 11/27/2024 No    Ventricular tachycardia [I47.20] 11/27/2024 No    Acute postoperative anemia due to expected blood loss [D62] 11/27/2024 Yes    SAH (subarachnoid hemorrhage) [I60.9] 11/27/2024 No    IVH (intraventricular hemorrhage) [I61.5] 11/27/2024 No    Tobacco dependency [F17.200] 11/20/2024 Yes    Neuropathy [G62.9] 01/18/2024 Yes    Gastric adenocarcinoma [C16.9] 05/25/2021 Yes    Tobacco abuse [Z72.0] 01/28/2021 Yes     Chronic    Chronic diastolic congestive heart failure [I50.32] 01/27/2021 Yes     Chronic    Pure hypercholesterolemia [E78.00] 01/27/2021 Yes     Chronic    Hypothyroidism [E03.9] 07/18/2019 Yes    Debility [R53.81]  Yes    Gait instability [R26.81] 07/15/2019 Yes    Essential hypertension [I10] 04/29/2019 Yes     Chronic    Severe obesity (BMI >= 40) [E66.01] 04/29/2019 Yes    Obstructive sleep apnea [G47.33] 08/09/2018 Yes     Chronic      Problems Resolved During this Admission:    Diagnosis Date Noted Date Resolved POA    Nervous [R45.0] 01/05/2024 12/13/2024 Yes    DVT of deep femoral vein, right [I82.411] 05/29/2023 12/09/2024 Yes    YOUNG (acute kidney injury) [N17.9] 10/15/2021 12/13/2024 Yes    Hypokalemia [E87.6]  12/09/2024 No      Discharged Condition: good     Disposition: Rehab Facility    Follow Up:     Future Appointments   Date Time Provider Department Center   12/18/2024 10:30 AM Mago Banks FNP [facility]   12/31/2024  9:15 AM [facility] OI-CT1 500 LB LIMIT [facility]   12/31/2024 11:00 AM Sejal Simon MD [facility] NEUROS[address]   1/3/2025 10:30 AM INJECTION, B[facility] CHEMO BAP CHEMO [facility]   1/6/2025  9:00 AM Lei Garcia MD [facility] IM [address] [facility]W   6/19/2025  9:40 AM Lei Garcia MD [facility]         Patient Instructions:      Ambulatory referral/consult to Smoking Cessation Program   Standing Status: Future   Referral Priority: Routine Referral Type: Consultation   Referral Reason: Specialty Services Required   Requested Specialty: CTTS   Number of Visits Requested: 1     Notify your health care provider if you experience any of the following:  temperature >100.4     Notify your health care provider if you experience any of the following:  persistent nausea and vomiting or diarrhea     Notify your health care provider if you experience any of the following:  severe uncontrolled pain     Notify your health care provider if you experience any of the following:  redness, tenderness, or signs of infection (pain, swelling, redness, odor or green/yellow discharge around incision site)     Notify your health care provider if you experience any of the following:  difficulty breathing or increased cough     Notify your health care provider if you experience any of the following:  severe persistent headache     Notify your health care provider if you experience any of the following:  worsening rash     Notify your health care provider if you experience any of the following:  persistent dizziness, light-headedness, or visual disturbances     Notify your health care provider if you experience any of the following:  increased confusion or weakness     Reason for not Prescribing Nicotine Replacement     Order Specific Question Answer Comments   Reason for not Prescribing: Not medically appropriate at this time      Activity as tolerated     Medications:  Reconciled Home Medications:      Medication List        START taking these medications       bisacodyL 10 mg Supp  Commonly known as: DULCOLAX  Place 1 suppository (10 mg total) rectally daily as needed (Until bowel movement if patient has no bowel movement for 2 days).     enoxaparin 60 mg/0.6 mL Syrg  Commonly known as: LOVENOX  Inject 0.6 mLs (60 mg total) into the skin 2 (two) times daily.     hydrALAZINE 50 MG tablet  Commonly known as: APRESOLINE  Take 1 tablet (50 mg total) by mouth every 8 (eight) hours.     levetiracetam 500 mg/5 mL (5 mL) Soln  Take 10 mLs (1,000 mg total) by mouth 2 (two) times daily.     polyethylene glycol 17 gram Pwpk  Commonly known as: GLYCOLAX  Take 17 g by mouth once daily.  Start taking on: December 17, 2024     senna-docusate 8.6-50 mg 8.6-50 mg per tablet  Commonly known as: PERICOLACE  Take 1 tablet by mouth 2 (two) times daily.            CONTINUE taking these medications      acetaminophen 500 MG tablet  Commonly known as: TYLENOL  Take by mouth daily as needed.     atorvastatin 20 MG tablet  Commonly known as: LIPITOR  Take 1 tablet (20 mg total) by mouth once daily.     B-complex with vitamin C tablet  Commonly known as: Z-Bec or Equiv  Take 1 tablet by mouth once daily.     CALCIUM CITRATE-VITAMIN D2 ORAL  Take 1 tablet by mouth once daily.     citalopram 10 MG tablet  Commonly known as: CeleXA  Take 1 tablet (10 mg total) by mouth once daily.     gabapentin 300 MG capsule  Commonly known as: NEURONTIN  Take 2 capsules (600 mg total) by mouth 3 (three) times daily.     multivitamin with minerals tablet  Take 1 tablet by mouth once daily.     OZEMPIC 1 mg/dose (4 mg/3 mL)  Generic drug: semaglutide  Inject 1 mg into the skin every 7 days.     pantoprazole 40 MG tablet  Commonly known as: PROTONIX  Take 1 tablet (40 mg total) by mouth once daily.            STOP taking these medications      furosemide 80 MG tablet  Commonly known as: LASIX     lisinopriL 40 MG tablet  Commonly known as: PRINIVIL,ZESTRIL     oxyCODONE-acetaminophen 5-325 mg per tablet  Commonly  known as: ROSETTA Peacock PA-C  Neurosurgery  [facility] Neuro[facility] ([facility])

## 2024-12-16 NOTE — ASSESSMENT & PLAN NOTE
Sept 2021 - Thrombosis of the right popliteal, posterior tibial, and peroneal veins. No prior PE.   Associated with reduced mobility from getting Chemo, Radiation at that time - considered to be provoked DVT however was continued due to underlying cancer, reduced mobility, and tobacco use.   On Anticoagulation - Apixaban       No Afib     PLAN:  - Currently on lovenox, transition back to home Eliquis dose in preparation for discharge if agreeable per primary team

## 2024-12-16 NOTE — ASSESSMENT & PLAN NOTE
"Latest ECHO  Results for orders placed during the hospital encounter of 10/22/24    Echo    Interpretation Summary    Left Ventricle: The left ventricle is normal in size. Normal wall thickness. There is normal systolic function with a visually estimated ejection fraction of 60 - 65%. There is normal diastolic function.    Right Ventricle: Normal right ventricular cavity size. Wall thickness is normal. Systolic function is normal.    Left Atrium: Left atrium is severely dilated.    Right Atrium: Right atrium is dilated.    Aortic Valve: The aortic valve is a trileaflet valve. There is moderate aortic valve sclerosis. Mildly restricted motion.    Tricuspid Valve: There is mild regurgitation.    IVC/SVC: Normal venous pressure at 3 mmHg.    Per cardiology note 11/15/24:    "Currently not having anginal symptoms and had a negative LHC in 2021. Stress echo and SPECT likely will not add information due to body habitus and PET will not be available prior to scheduled urgent surgery. She is probably at a moderate risk for cardiovascular events in a low to intermediate cardiac risk surgery. Recommend proceeding without any further cardiac testing. Discussed risks and benefits with patients and she is in agreement. "     Current Heart Failure Medications  hydrALAZINE injection 10 mg, Every 4 hours PRN, Intravenous  labetalol 20 mg/4 mL (5 mg/mL) IV syring, Every 4 hours PRN, Intravenous  hydrALAZINE tablet 50 mg, Every 8 hours, Oral    PLAN:    Latest Echo (10/22/24, as above) does not appear c/f HFpEF; however, chart review is s/f this diagnosis.  Monitor strict I&Os every 4 hours and daily weights.    On telemetry.  "

## 2024-12-16 NOTE — TELEPHONE ENCOUNTER
----- Message from Virgie Peacock PA-C sent at 12/16/2024 11:06 AM CST -----  Can you schedule this patient for hospital follow-up in 3 weeks? She was admitted 11/20 for multiple aneurysm clippings and has had a complicated hospital course. She will be going to [facility] inpatient rehab for continued recovery. We discontinued her lisinopril due to hyperkalemia and lasix due to overdiuresis and worsening dehydration. She is now on hydralazine for blood pressure control.    Thanks,  Virgie Peacock PA-C  Neurosurgery  [facility]

## 2024-12-16 NOTE — SUBJECTIVE & OBJECTIVE
Interval History: No events overnight. Patient seen working with PT this morning. BP mildly soft this AM, hold parameters added to hydralazine. Ordered another dose of lokelma today for a K of 5.1. Cr stable. Pending dispo.    Review of Systems  Objective:     Vital Signs (Most Recent):  Temp: 97.4 °F (36.3 °C) (12/16/24 0743)  Pulse: 86 (12/16/24 0743)  Resp: 18 (12/16/24 0743)  BP: (!) 112/56 (12/16/24 0743)  SpO2: 98 % (12/16/24 0743) Vital Signs (24h Range):  Temp:  [97.4 °F (36.3 °C)-98.6 °F (37 °C)] 97.4 °F (36.3 °C)  Pulse:  [74-95] 86  Resp:  [17-18] 18  SpO2:  [95 %-99 %] 98 %  BP: (111-127)/(55-78) 112/56     Weight: (!) 147 kg (324 lb 1.2 oz)  Body mass index is 52.31 kg/m².    Intake/Output Summary (Last 24 hours) at 12/16/2024 0831  Last data filed at 12/16/2024 0509  Gross per 24 hour   Intake --   Output 1280 ml   Net -1280 ml         Physical Exam  Vitals and nursing note reviewed.   Constitutional:       General: She is not in acute distress.     Appearance: Normal appearance. She is not ill-appearing.   HENT:      Head: Normocephalic and atraumatic.   Cardiovascular:      Rate and Rhythm: Normal rate.   Pulmonary:      Effort: Pulmonary effort is normal. No respiratory distress.   Musculoskeletal:         General: Normal range of motion.   Skin:     General: Skin is warm and dry.   Neurological:      Mental Status: She is alert.      Motor: Weakness present.             Significant Labs: All pertinent labs within the past 24 hours have been reviewed.    Significant Imaging: I have reviewed all pertinent imaging results/findings within the past 24 hours.

## 2024-12-17 LAB
ALDOST SERPL-MCNC: 5 NG/DL
ALDOST/RENIN PLAS-RTO: 0.5 RATIO
METHYLMALONATE SERPL-SCNC: 0.17 UMOL/L
RENIN PLAS-CCNC: 9.7 NG/ML/HR

## 2024-12-18 ENCOUNTER — TELEPHONE (OUTPATIENT)
Dept: INTERNAL MEDICINE | Facility: CLINIC | Age: 74
End: 2024-12-18
Payer: MEDICARE

## 2024-12-29 ENCOUNTER — HOSPITAL ENCOUNTER (EMERGENCY)
Facility: HOSPITAL | Age: 74
Discharge: HOME | End: 2024-12-29
Attending: EMERGENCY MEDICINE
Payer: MEDICARE

## 2024-12-29 VITALS
TEMPERATURE: 98 F | DIASTOLIC BLOOD PRESSURE: 61 MMHG | BODY MASS INDEX: 52.46 KG/M2 | HEART RATE: 77 BPM | WEIGHT: 293 LBS | OXYGEN SATURATION: 95 % | RESPIRATION RATE: 15 BRPM | SYSTOLIC BLOOD PRESSURE: 123 MMHG

## 2024-12-29 DIAGNOSIS — R51.9 NONINTRACTABLE HEADACHE, UNSPECIFIED CHRONICITY PATTERN, UNSPECIFIED HEADACHE TYPE: ICD-10-CM

## 2024-12-29 DIAGNOSIS — R29.818 ACUTE FOCAL NEUROLOGICAL DEFICIT: ICD-10-CM

## 2024-12-29 DIAGNOSIS — G51.0 BELL'S PALSY: Primary | ICD-10-CM

## 2024-12-29 LAB
ALBUMIN SERPL BCP-MCNC: 2.8 G/DL (ref 3.5–5.2)
ALP SERPL-CCNC: 125 U/L (ref 40–150)
ALT SERPL W/O P-5'-P-CCNC: 16 U/L (ref 10–44)
ANION GAP SERPL CALC-SCNC: 9 MMOL/L (ref 8–16)
AST SERPL-CCNC: 17 U/L (ref 10–40)
BASOPHILS # BLD AUTO: 0.02 K/UL (ref 0–0.2)
BASOPHILS NFR BLD: 0.2 % (ref 0–1.9)
BILIRUB SERPL-MCNC: 0.3 MG/DL (ref 0.1–1)
BUN SERPL-MCNC: 18 MG/DL (ref 8–23)
CALCIUM SERPL-MCNC: 9.9 MG/DL (ref 8.7–10.5)
CHLORIDE SERPL-SCNC: 108 MMOL/L (ref 95–110)
CHOLEST SERPL-MCNC: 125 MG/DL (ref 120–199)
CHOLEST/HDLC SERPL: 2.6 {RATIO} (ref 2–5)
CO2 SERPL-SCNC: 22 MMOL/L (ref 23–29)
CREAT SERPL-MCNC: 0.8 MG/DL (ref 0.5–1.4)
CREAT SERPL-MCNC: 0.8 MG/DL (ref 0.5–1.4)
DIFFERENTIAL METHOD BLD: ABNORMAL
EOSINOPHIL # BLD AUTO: 0.1 K/UL (ref 0–0.5)
EOSINOPHIL NFR BLD: 0.6 % (ref 0–8)
ERYTHROCYTE [DISTWIDTH] IN BLOOD BY AUTOMATED COUNT: 12.9 % (ref 11.5–14.5)
EST. GFR  (NO RACE VARIABLE): >60 ML/MIN/1.73 M^2
GLUCOSE SERPL-MCNC: 133 MG/DL (ref 70–110)
HCT VFR BLD AUTO: 33.8 % (ref 37–48.5)
HDLC SERPL-MCNC: 49 MG/DL (ref 40–75)
HDLC SERPL: 39.2 % (ref 20–50)
HGB BLD-MCNC: 10.9 G/DL (ref 12–16)
IMM GRANULOCYTES # BLD AUTO: 0.04 K/UL (ref 0–0.04)
IMM GRANULOCYTES NFR BLD AUTO: 0.4 % (ref 0–0.5)
INR PPP: 1 (ref 0.8–1.2)
LDLC SERPL CALC-MCNC: 60.8 MG/DL (ref 63–159)
LYMPHOCYTES # BLD AUTO: 2.2 K/UL (ref 1–4.8)
LYMPHOCYTES NFR BLD: 23 % (ref 18–48)
MCH RBC QN AUTO: 33 PG (ref 27–31)
MCHC RBC AUTO-ENTMCNC: 32.2 G/DL (ref 32–36)
MCV RBC AUTO: 102 FL (ref 82–98)
MONOCYTES # BLD AUTO: 0.8 K/UL (ref 0.3–1)
MONOCYTES NFR BLD: 8.8 % (ref 4–15)
NEUTROPHILS # BLD AUTO: 6.4 K/UL (ref 1.8–7.7)
NEUTROPHILS NFR BLD: 67 % (ref 38–73)
NONHDLC SERPL-MCNC: 76 MG/DL
NRBC BLD-RTO: 0 /100 WBC
OHS QRS DURATION: 88 MS
OHS QTC CALCULATION: 462 MS
PLATELET # BLD AUTO: 248 K/UL (ref 150–450)
PMV BLD AUTO: 10.5 FL (ref 9.2–12.9)
POC PTINR: 1.2 (ref 0.9–1.2)
POC PTWBT: 12.6 SEC (ref 9.7–14.3)
POCT GLUCOSE: 133 MG/DL (ref 70–110)
POTASSIUM SERPL-SCNC: 4.2 MMOL/L (ref 3.5–5.1)
PROT SERPL-MCNC: 6.4 G/DL (ref 6–8.4)
PROTHROMBIN TIME: 11.4 SEC (ref 9–12.5)
RBC # BLD AUTO: 3.3 M/UL (ref 4–5.4)
SAMPLE: NORMAL
SAMPLE: NORMAL
SODIUM SERPL-SCNC: 139 MMOL/L (ref 136–145)
TRIGL SERPL-MCNC: 76 MG/DL (ref 30–150)
TROPONIN I SERPL DL<=0.01 NG/ML-MCNC: 5 NG/L (ref 0–14)
TSH SERPL DL<=0.005 MIU/L-ACNC: 0.64 UIU/ML (ref 0.4–4)
WBC # BLD AUTO: 9.54 K/UL (ref 3.9–12.7)

## 2024-12-29 PROCEDURE — 25500020 PHARM REV CODE 255: Mod: HCNC | Performed by: EMERGENCY MEDICINE

## 2024-12-29 PROCEDURE — 85025 COMPLETE CBC W/AUTO DIFF WBC: CPT | Mod: HCNC | Performed by: EMERGENCY MEDICINE

## 2024-12-29 PROCEDURE — 99284 EMERGENCY DEPT VISIT MOD MDM: CPT | Mod: HCNC,,, | Performed by: PSYCHIATRY & NEUROLOGY

## 2024-12-29 PROCEDURE — 80061 LIPID PANEL: CPT | Mod: HCNC | Performed by: EMERGENCY MEDICINE

## 2024-12-29 PROCEDURE — 84443 ASSAY THYROID STIM HORMONE: CPT | Mod: HCNC | Performed by: EMERGENCY MEDICINE

## 2024-12-29 PROCEDURE — 99900035 HC TECH TIME PER 15 MIN (STAT): Mod: HCNC

## 2024-12-29 PROCEDURE — 85610 PROTHROMBIN TIME: CPT | Mod: HCNC

## 2024-12-29 PROCEDURE — 93010 ELECTROCARDIOGRAM REPORT: CPT | Mod: HCNC,,, | Performed by: INTERNAL MEDICINE

## 2024-12-29 PROCEDURE — 63600175 PHARM REV CODE 636 W HCPCS: Mod: HCNC | Performed by: EMERGENCY MEDICINE

## 2024-12-29 PROCEDURE — 84484 ASSAY OF TROPONIN QUANT: CPT | Mod: HCNC | Performed by: EMERGENCY MEDICINE

## 2024-12-29 PROCEDURE — 99285 EMERGENCY DEPT VISIT HI MDM: CPT | Mod: 25,HCNC

## 2024-12-29 PROCEDURE — 82803 BLOOD GASES ANY COMBINATION: CPT | Mod: HCNC

## 2024-12-29 PROCEDURE — 80053 COMPREHEN METABOLIC PANEL: CPT | Mod: HCNC | Performed by: EMERGENCY MEDICINE

## 2024-12-29 PROCEDURE — 82962 GLUCOSE BLOOD TEST: CPT | Mod: HCNC

## 2024-12-29 PROCEDURE — 82565 ASSAY OF CREATININE: CPT | Mod: HCNC

## 2024-12-29 PROCEDURE — 25000003 PHARM REV CODE 250: Mod: HCNC | Performed by: EMERGENCY MEDICINE

## 2024-12-29 PROCEDURE — 85610 PROTHROMBIN TIME: CPT | Mod: HCNC | Performed by: EMERGENCY MEDICINE

## 2024-12-29 PROCEDURE — 93005 ELECTROCARDIOGRAM TRACING: CPT | Mod: HCNC

## 2024-12-29 RX ORDER — PREDNISONE 20 MG/1
60 TABLET ORAL DAILY
Qty: 21 TABLET | Refills: 0 | Status: SHIPPED | OUTPATIENT
Start: 2024-12-29 | End: 2025-01-05

## 2024-12-29 RX ORDER — VALACYCLOVIR HYDROCHLORIDE 500 MG/1
1000 TABLET, FILM COATED ORAL
Status: COMPLETED | OUTPATIENT
Start: 2024-12-29 | End: 2024-12-29

## 2024-12-29 RX ORDER — PREDNISONE 20 MG/1
60 TABLET ORAL DAILY
Qty: 21 TABLET | Refills: 0 | Status: SHIPPED | OUTPATIENT
Start: 2024-12-29 | End: 2024-12-29

## 2024-12-29 RX ORDER — PREDNISONE 20 MG/1
60 TABLET ORAL
Status: COMPLETED | OUTPATIENT
Start: 2024-12-29 | End: 2024-12-29

## 2024-12-29 RX ORDER — VALACYCLOVIR HYDROCHLORIDE 1 G/1
1000 TABLET, FILM COATED ORAL 2 TIMES DAILY
Qty: 14 TABLET | Refills: 0 | Status: SHIPPED | OUTPATIENT
Start: 2024-12-29 | End: 2024-12-29

## 2024-12-29 RX ORDER — VALACYCLOVIR HYDROCHLORIDE 1 G/1
1000 TABLET, FILM COATED ORAL 3 TIMES DAILY
Qty: 21 TABLET | Refills: 0 | Status: SHIPPED | OUTPATIENT
Start: 2024-12-29 | End: 2024-12-29

## 2024-12-29 RX ORDER — VALACYCLOVIR HYDROCHLORIDE 1 G/1
1000 TABLET, FILM COATED ORAL 3 TIMES DAILY
Qty: 21 TABLET | Refills: 0 | Status: SHIPPED | OUTPATIENT
Start: 2024-12-29 | End: 2025-01-05

## 2024-12-29 RX ADMIN — IOHEXOL 100 ML: 350 INJECTION, SOLUTION INTRAVENOUS at 01:12

## 2024-12-29 RX ADMIN — VALACYCLOVIR HYDROCHLORIDE 1000 MG: 500 TABLET, FILM COATED ORAL at 03:12

## 2024-12-29 RX ADMIN — PREDNISONE 60 MG: 20 TABLET ORAL at 03:12

## 2024-12-29 NOTE — SUBJECTIVE & OBJECTIVE
Past Medical History:   Diagnosis Date    YOUNG (acute kidney injury) 10/15/2021    Anemia     2018    Arthritis     BMI 60.0-69.9, adult     Cataract     Chronic anticoagulation 11/27/2024    DVT in currently on Eliquis     Chronic diastolic congestive heart failure 01/27/2021    Coronary artery disease involving native coronary artery of native heart without angina pectoris 11/15/2024    Deep vein thrombosis     Depression     Dry eye syndrome     DVT of deep femoral vein, right 05/29/2023    Gastric adenocarcinoma 05/25/2021    GERD (gastroesophageal reflux disease)     Glaucoma suspect     History of gastric ulcer     History of psychiatric hospitalization     Hx of psychiatric care     Celexa, no recall of charted Effexor, Lexapro, Desyrel prescriptions    Hyperlipidemia     Hypertension     Hypothyroidism     Morbid obesity     Neuromuscular disorder     Sleep apnea     Thyroid disease      Past Surgical History:   Procedure Laterality Date    APPENDECTOMY      CATARACT EXTRACTION W/  INTRAOCULAR LENS IMPLANT Bilateral     MFIOL OU    CLIP LIGATION OF INTRACRANIAL ANEURYSM BY CRANIOTOMY Left 11/20/2024    Procedure: CRANIOTOMY, WITH ANEURYSM CLIPPING W/ STEALTH;  Surgeon: Sejal Simon MD;  Location: [facility] OR [address] [place] [address];  Service: Neurosurgery;  Laterality: Left;  left pterioral craniotomy with microsurgical clip ligation of mca and anterior chroidal aneurysm, dr. ginna hopkins co surgeon, scalp block, type and cross 2 units, neuromonitoring sep,mep,eeg, regular bed, [place], supine spencer, icu pos    CORONARY ANGIOGRAPHY Bilateral 02/24/2021    Procedure: ANGIOGRAM, CORONARY ARTERY;  Surgeon: Cresencio Ridley MD;  Location: [facility] CATH LAB;  Service: Cardiology;  Laterality: Bilateral;  Covid test needed - ok per Danay SSCU. Pt. w/o transportation or family    CRANIOTOMY, FOR ANEURYSM OF VERTEBROBASILAR OR CAROTID CIRCULATION Left 11/20/2024    Procedure: CRANIOTOMY, FOR ANEURYSM OF VERTEBROBASILAR OR  CAROTID CIRCULATION;  Surgeon: Sejal Simon MD;  Location: [facility] OR 2ND FLR;  Service: Neurosurgery;  Laterality: Left;  with scalp block    DISSECTION, NERVE, USING OPERATING MICROSCOPE Left 11/20/2024    Procedure: DISSECTION, NERVE, USING OPERATING MICROSCOPE;  Surgeon: Sejal Simon MD;  Location: [facility] OR 2ND FLR;  Service: Neurosurgery;  Laterality: Left;  with scalp block    ENDOSCOPIC ULTRASOUND OF UPPER GASTROINTESTINAL TRACT N/A 03/17/2021    Procedure: ULTRASOUND, UPPER GI TRACT, ENDOSCOPIC;  Surgeon: Gerald Anaya MD;  Location: [facility] ENDO (2ND FLR);  Service: Endoscopy;  Laterality: N/A;  Pt unable to get a ride for swab. Will do rapid test at 8:30 - ttr    ENDOSCOPIC ULTRASOUND OF UPPER GASTROINTESTINAL TRACT N/A 01/13/2022    Procedure: ULTRASOUND, UPPER GI TRACT, ENDOSCOPIC;  Surgeon: Kevin Carballo MD;  Location: [facility] ENDO (2ND FLR);  Service: Endoscopy;  Laterality: N/A;  blood thinner approval received, see telephone encounter 1/7/22-BB  rapid  instructions given verbally and sent to address on file in pt's chart-BB    ENDOSCOPIC ULTRASOUND OF UPPER GASTROINTESTINAL TRACT N/A 10/11/2022    Procedure: ULTRASOUND, UPPER GI TRACT, ENDOSCOPIC;  Surgeon: Dequan Ridley MD;  Location: [facility] ENDO (2ND FLR);  Service: Endoscopy;  Laterality: N/A;    ENDOSCOPIC ULTRASOUND OF UPPER GASTROINTESTINAL TRACT N/A 01/24/2024    Procedure: ULTRASOUND, UPPER GI TRACT, ENDOSCOPIC;  Surgeon: Kevin Carballo MD;  Location: [facility] ENDO (2ND FLR);  Service: Endoscopy;  Laterality: N/A;    ENDOSCOPIC ULTRASOUND OF UPPER GASTROINTESTINAL TRACT N/A 03/05/2024    Procedure: ULTRASOUND, UPPER GI TRACT, ENDOSCOPIC;  Surgeon: Kevin Carballo MD;  Location: [facility] ENDO (2ND FLR);  Service: Endoscopy;  Laterality: N/A;  1/25 portal instr-Repeat theEUS at the next available appointment because the preparation was poor. 48hrs of liquid. Ozempic 7 day hold-eliquis approved Dr. Pelaez TE  12/12/2023-tt  2/28-precall complete-pt verbalized udnerstanding of holding Oz    ESOPHAGOGASTRODUODENOSCOPY N/A 02/05/2021    Procedure: EGD (ESOPHAGOGASTRODUODENOSCOPY);  Surgeon: Matthew Hernadez MD;  Location: [facility] ENDO (2ND FLR);  Service: Endoscopy;  Laterality: N/A;  BMI-58    Wt: 361#     COVID test at W on 2/2-GT    ESOPHAGOGASTRODUODENOSCOPY N/A 03/17/2021    Procedure: EGD (ESOPHAGOGASTRODUODENOSCOPY);  Surgeon: Gerald Anaya MD;  Location: [facility] ENDO (2ND FLR);  Service: Endoscopy;  Laterality: N/A;    ESOPHAGOGASTRODUODENOSCOPY N/A 05/25/2021    Procedure: EGD (ESOPHAGOGASTRODUODENOSCOPY);  Surgeon: Kevin Carballo MD;  Location: [facility] ENDO (2ND FLR);  Service: Endoscopy;  Laterality: N/A;  ESD 2 hours  Full COVID vaccination on file. ttr    ESOPHAGOGASTRODUODENOSCOPY N/A 01/13/2022    Procedure: EGD (ESOPHAGOGASTRODUODENOSCOPY);  Surgeon: Kevin Carballo MD;  Location: [facility] ENDO (2ND FLR);  Service: Endoscopy;  Laterality: N/A;  blood thinner approval, see telephone encounter 1/7/22-BB  rapid  instructions given verbally and sent to address on file in pt's chart-BB    ESOPHAGOGASTRODUODENOSCOPY N/A 10/11/2022    Procedure: EGD (ESOPHAGOGASTRODUODENOSCOPY);  Surgeon: Dequan Ridley MD;  Location: [facility] ENDO (2ND FLR);  Service: Endoscopy;  Laterality: N/A;  She is do for EGD/EUS now. Personal history of gastric cancer. Ca Coats #[id number].   Thanks,   Kevin Fuentes MD    Pt is fully vaccinated-DS  9/14/22-Approval to hold Eliquis rec'd from Dr. Pelaez (see telephone encounter 9/14/22)-DS  9/14/22-Ins    ESOPHAGOGASTRODUODENOSCOPY N/A 01/24/2024    Procedure: EGD (ESOPHAGOGASTRODUODENOSCOPY);  Surgeon: Kevin Carballo MD;  Location: [facility] ([address] [place] [address]);  Service: Endoscopy;  Laterality: N/A;  12/8/23: instructions sent via portal. eliquis approval from MD Pelaez in telephone encounter (12/12/23) -GD  1/9-precall complete-MS    ESOPHAGOGASTRODUODENOSCOPY N/A 03/05/2024    Procedure: EGD  (ESOPHAGOGASTRODUODENOSCOPY);  Surgeon: Kevin Carballo MD;  [facility]  Service: Endoscopy;  Laterality: N/A;    EYE SURGERY      HYSTEROSCOPIC POLYPECTOMY OF UTERUS  01/10/2024    Procedure: POLYPECTOMY, UTERUS, HYSTEROSCOPIC;  Surgeon: Pina Pickens MD;  Location: [facility] [place] OR;  Service: OB/GYN;;    HYSTEROSCOPY WITH DILATION AND CURETTAGE OF UTERUS N/A 01/10/2024    Procedure: HYSTEROSCOPY, WITH DILATION AND CURETTAGE OF UTERUS;  Surgeon: Pina Pickens MD;  Location: [facility] [place] OR;  Service: OB/GYN;  Laterality: N/A;    INJECTION OF ANESTHETIC AGENT AROUND NERVE Left 07/10/2018    Procedure: BLOCK, NERVE;  Surgeon: Samantha Vidal MD;  Location: [facility] [place] PAIN MGT;  Service: Pain Management;  Laterality: Left;  Genicular     INJECTION OF ANESTHETIC AGENT AROUND NERVE Left 07/19/2019    Procedure: Block, Nerve NERVE BLOCK GENICULAR WITH PHENOL 6%;  Surgeon: Samantha Vidal MD;  Location: [facility] [place] PAIN MGT;  Service: Pain Management;  Laterality: Left;  NEEDS CONSENT    INSERTION OF TUNNELED CENTRAL VENOUS CATHETER (CVC) WITH SUBCUTANEOUS PORT N/A 07/09/2021    Procedure: INSERTION, PORT-A-CATH;  Surgeon: Mario Paz MD;  Location: [facility] [place] CATH LAB;  Service: Radiology;  Laterality: N/A;  PORT PLACEMENT    RADIOFREQUENCY ABLATION Left 06/19/2020    Procedure: RADIOFREQUENCY ABLATION LEFT GENICULAR;  Surgeon: Tevin Clark MD;  Location: [facility] [place] PAIN MGT;  Service: Pain Management;  Laterality: Left;  Left Genicular RFA    RADIOFREQUENCY ABLATION Left 01/22/2021    Procedure: RADIOFREQUENCY ABLATION LEFT GENICULAR;  Surgeon: Tevin Clark MD;  Location: [facility] [place] PAIN MGT;  Service: Pain Management;  Laterality: Left;    RADIOFREQUENCY ABLATION Left 07/30/2021    Procedure: RADIOFREQUENCY ABLATION, GENICULAR COOLED NEED CONSENT;  Surgeon: Tevin Clark MD;  Location: [facility] [place] PAIN MGT;  Service: Pain Management;  Laterality: Left;    RADIOFREQUENCY ABLATION Left 04/22/2022    Procedure:  RADIOFREQUENCY ABLATION, GENICULAR COOLED , LEFT;  Surgeon: Tevin Clark MD;  Location: [facility];  Service: Pain Management;  Laterality: Left;    RADIOFREQUENCY ABLATION Left 2022    Procedure: RADIOFREQUENCY ABLATION LEFT GENICULAR COOLED CLEARED TO HOLD ELIQUIS 3 DAYS  NEEDS CONSENT;  Surgeon: Tevin Clark MD;  Location: [facility];  Service: Pain Management;  Laterality: Left;    TONSILLECTOMY       Social History     Tobacco Use    Smoking status: Some Days     Current packs/day: 0.00     Average packs/day: 1 pack/day for 53.9 years (53.7 ttl pk-yrs)     Types: Cigarettes     Start date:      Last attempt to quit: 2023     Years since quittin.0    Smokeless tobacco: Never    Tobacco comments:     currently smoking <5 cigarettes most days   Substance Use Topics    Alcohol use: Yes     Alcohol/week: 1.0 standard drink of alcohol     Types: 1 Glasses of wine per week     Comment: rarely    Drug use: No     Review of patient's allergies indicates:  No Known Allergies    Medications: I have reviewed the current medication administration record.    (Not in a hospital admission)      Review of Systems   Constitutional: Negative.    Respiratory: Negative.     Cardiovascular: Negative.    Neurological:  Positive for headaches.     Objective:     Vital Signs (Most Recent):  Temp: 98.2 °F (36.8 °C) (24 1339)  Pulse: 74 (24 1520)  Resp: 19 (24 1520)  BP: (!) 119/59 (24 1520)  SpO2: 96 % (24 1520)    Vital Signs Range (Last 24H):  Temp:  [98.2 °F (36.8 °C)]   Pulse:  [74-83]   Resp:  [18-21]   BP: (119-129)/(59-69)   SpO2:  [94 %-98 %]        Physical Exam  Constitutional:       Appearance: She is obese.   HENT:      Mouth/Throat:      Mouth: Mucous membranes are moist.   Eyes:      Pupils: Pupils are equal, round, and reactive to light.   Cardiovascular:      Rate and Rhythm: Normal rate.   Pulmonary:      Effort: No respiratory distress.   Skin:      "General: Skin is warm and dry.   Neurological:      Mental Status: She is alert.              Neurological Exam:   LOC: alert  Attention Span: Good   Language: No aphasia  Articulation: No dysarthria  Orientation: Person, Place, Time   Facial Movement (CN VII): Upper facial weakness on the Left  Motor: Arm left  Normal 5/5  Leg left  Paresis: 4/5  Arm right  Normal 5/5  Leg right Paresis: 4/5      Laboratory:  CMP:   Recent Labs   Lab 12/29/24  1355   CALCIUM 9.9   ALBUMIN 2.8*   PROT 6.4      K 4.2   CO2 22*      BUN 18   CREATININE 0.8   ALKPHOS 125   ALT 16   AST 17   BILITOT 0.3     CBC:   Recent Labs   Lab 12/29/24  1355   WBC 9.54   RBC 3.30*   HGB 10.9*   HCT 33.8*      *   MCH 33.0*   MCHC 32.2     Lipid Panel:   Recent Labs   Lab 12/29/24  1355   CHOL 125   LDLCALC 60.8*   HDL 49   TRIG 76     Coagulation:   Recent Labs   Lab 12/29/24  1419   INR 1.2     Hgb A1C: No results for input(s): "HGBA1C" in the last 168 hours.  TSH:   Recent Labs   Lab 12/29/24  1355   TSH 0.636       Diagnostic Results:    Brain imaging/Vessel Imaging:  Impression:     1. Relative to prior head CT performed 12/05/2024, continued maturing postoperative appearance status post left-sided craniotomy for clipping of left ICA and left MCA aneurysms.  Extra-axial fluid collection superficial to the duraplasty material measuring on the order of 6-7 mm in thickness with mild mass effect upon the subjacent sulci and gyri. Overall decreased attenuation of this collection compared to the prior CT, noting of small amount of residual hyperattenuating postoperative blood products anteriorly.  2. No definite evidence of acute transcortical infarct by CT.  MRI may be considered for more sensitive and specific assessment if there is continued clinical concern for acute ischemia or recent infarction.  3. Noting limitations imposed by artifact, no definite residual or recurrent ICA or MCA aneurysm status post clipping.  4. " There is suggestion of mild/mild-to-moderate luminal irregularity and narrowing of the distal right M1 and proximal M2 MCA branches, appearing more pronounced involving the superior division branches. No definite complete vessel occlusion is suggested.  While appearance may be artifactual due to artifact from adjoining aneurysm clips, spasm difficult to entirely exclude.  5. No short-term change in the appearance of the unruptured multilobulated basilar apex aneurysm relative to most recent CTA performed 11/21/2024.

## 2024-12-29 NOTE — HPI
Ca Coats is a 74 y.o. female with PMH of HTN, HLD, T2DM, prior stroke (residual BLE weakness) that presents to the ED c/o HA. LKW 12/29/24 at 1100. Exam significant for BLE weakness which is unchanged from baseline per her daughter at bedside. Pt [facility], Code stroke activated. NIHSS 4 for BLE weakness. CTH/CTA stroke multiphase negative for acute intracranial findings and negative for LVO. Determined not a candidate for acute stroke interventions, no TNK due to low NIHSS and no thrombectomy due to no LVO and nondisabling symptoms. On exam, left upper facial paralysis and left eye redness. No focal deficits appreciated on exam. Considering physical exam and CTH/CTA findings, there is low suspicion for stroke.

## 2024-12-29 NOTE — ED TRIAGE NOTES
Pt arrives from rehab after hemorraghic stroke in November; new onset headache and blurry vision beginning around 1100 today

## 2024-12-29 NOTE — ED PROVIDER NOTES
Encounter Date: 12/29/2024       History     Chief Complaint   Patient presents with    Headache     Pt has history of CVA and is being treated at rehab. Pt had acute onset of headache and blurred vision at 1100 today.      HPI  74-year-old female who presents with a headache and left-sided facial droop.  Last known well at 11:00 a.m. today.  She recently was admitted for an intracranial bleed.  EMS report blood sugar 130.  They know that she developed a severe headache and then her left eye was drooping.  No lower facial droop.  No weakness or numbness in her arms or legs.  No chest pain or shortness of breath.  Review of patient's allergies indicates:  No Known Allergies  Past Medical History:   Diagnosis Date    YOUNG (acute kidney injury) 10/15/2021    Anemia     2018    Arthritis     BMI 60.0-69.9, adult     Cataract     Chronic anticoagulation 11/27/2024    DVT in currently on Eliquis     Chronic diastolic congestive heart failure 01/27/2021    Coronary artery disease involving native coronary artery of native heart without angina pectoris 11/15/2024    Deep vein thrombosis     Depression     Dry eye syndrome     DVT of deep femoral vein, right 05/29/2023    Gastric adenocarcinoma 05/25/2021    GERD (gastroesophageal reflux disease)     Glaucoma suspect     History of gastric ulcer     History of psychiatric hospitalization     Hx of psychiatric care     Celexa, no recall of charted Effexor, Lexapro, Desyrel prescriptions    Hyperlipidemia     Hypertension     Hypothyroidism     Morbid obesity     Neuromuscular disorder     Sleep apnea     Thyroid disease      Past Surgical History:   Procedure Laterality Date    APPENDECTOMY      CATARACT EXTRACTION W/  INTRAOCULAR LENS IMPLANT Bilateral     MFIOL OU    CLIP LIGATION OF INTRACRANIAL ANEURYSM BY CRANIOTOMY Left 11/20/2024    Procedure: CRANIOTOMY, WITH ANEURYSM CLIPPING W/ STEALTH;  Surgeon: Sejal Simon MD;  Location: [facility] OR [address] [place] [address];  Service:  Neurosurgery;  Laterality: Left;  left pterioral craniotomy with microsurgical clip ligation of mca and anterior chroidal aneurysm, dr. ginna zhou surgeon, scalp block, type and cross 2 units, neuromonitoring sep,mep,eeg, regular bed, desouza, supine spencer, icu pos    CORONARY ANGIOGRAPHY Bilateral 02/24/2021    Procedure: ANGIOGRAM, CORONARY ARTERY;  Surgeon: Cresencio Ridley MD;  Location: [facility] CATH LAB;  Service: Cardiology;  Laterality: Bilateral;  Covid test needed - ok per Danay SSCU. Pt. w/o transportation or family    CRANIOTOMY, FOR ANEURYSM OF VERTEBROBASILAR OR CAROTID CIRCULATION Left 11/20/2024    Procedure: CRANIOTOMY, FOR ANEURYSM OF VERTEBROBASILAR OR CAROTID CIRCULATION;  Surgeon: Sejal Simon MD;  [facility];  Service: Neurosurgery;  Laterality: Left;  with scalp block    DISSECTION, NERVE, USING OPERATING MICROSCOPE Left 11/20/2024    Procedure: DISSECTION, NERVE, USING OPERATING MICROSCOPE;  Surgeon: Sejal Simon MD;  Location[facility];  Service: Neurosurgery;  Laterality: Left;  with scalp block    ENDOSCOPIC ULTRASOUND OF UPPER GASTROINTESTINAL TRACT N/A 03/17/2021    Procedure: ULTRASOUND, UPPER GI TRACT, ENDOSCOPIC;  Surgeon: Gerald Anaya MD;  Location: [facility] ([facility]  Service: Endoscopy;  Laterality: N/A;  Pt unable to get a ride for swab. Will do rapid test at 8:30 - ttr    ENDOSCOPIC ULTRASOUND OF UPPER GASTROINTESTINAL TRACT N/A 01/13/2022    Procedure: ULTRASOUND, UPPER GI TRACT, ENDOSCOPIC;  Surgeon: Kevin Carballo MD;  Location: [facility] ([facility]  Service: Endoscopy;  Laterality: N/A;  blood thinner approval received, see telephone encounter 1/7/22-BB  rapid  instructions given verbally and sent to address on file in pt's chart-BB    ENDOSCOPIC ULTRASOUND OF UPPER GASTROINTESTINAL TRACT N/A 10/11/2022    Procedure: ULTRASOUND, UPPER GI TRACT, ENDOSCOPIC;  Surgeon: Dequan Ridley MD;  Location: [facility] ([facility]);   Service: Endoscopy;  Laterality: N/A;    ENDOSCOPIC ULTRASOUND OF UPPER GASTROINTESTINAL TRACT N/A 01/24/2024    Procedure: ULTRASOUND, UPPER GI TRACT, ENDOSCOPIC;  Surgeon: Kevin Carballo MD;  Location: [facility] ENDO (2ND FLR);  Service: Endoscopy;  Laterality: N/A;    ENDOSCOPIC ULTRASOUND OF UPPER GASTROINTESTINAL TRACT N/A 03/05/2024    Procedure: ULTRASOUND, UPPER GI TRACT, ENDOSCOPIC;  Surgeon: Kevin Carballo MD;  Location: [facility] ENDO (2ND FLR);  Service: Endoscopy;  Laterality: N/A;  1/25 portal instr-Repeat theEUS at the next available appointment because the preparation was poor. 48hrs of liquid. Ozempic 7 day hold-eliquis approved Dr. Pelaez TE 12/12/2023-tt  2/28-precall complete-pt verbalized udnerstanding of holding Oz    ESOPHAGOGASTRODUODENOSCOPY N/A 02/05/2021    Procedure: EGD (ESOPHAGOGASTRODUODENOSCOPY);  Surgeon: Matthew Hernadez MD;  Location: [facility] (2ND FLR);  Service: Endoscopy;  Laterality: N/A;  BMI-58    Wt: 361#     COVID test at [facility] on 2/2-GT    ESOPHAGOGASTRODUODENOSCOPY N/A 03/17/2021    Procedure: EGD (ESOPHAGOGASTRODUODENOSCOPY);  Surgeon: Gerald Anaya MD;  Location: [facility] ENDO (2ND FLR);  Service: Endoscopy;  Laterality: N/A;    ESOPHAGOGASTRODUODENOSCOPY N/A 05/25/2021    Procedure: EGD (ESOPHAGOGASTRODUODENOSCOPY);  Surgeon: Kevin Carballo MD;  Location: [facility] ENDO (2ND FLR);  Service: Endoscopy;  Laterality: N/A;  ESD 2 hours  Full COVID vaccination on file. ttr    ESOPHAGOGASTRODUODENOSCOPY N/A 01/13/2022    Procedure: EGD (ESOPHAGOGASTRODUODENOSCOPY);  Surgeon: Kevin Carballo MD;  Location: [facility] ENDO (2ND FLR);  Service: Endoscopy;  Laterality: N/A;  blood thinner approval, see telephone encounter 1/7/22-BB  rapid  instructions given verbally and sent to address on file in pt's chart-BB    ESOPHAGOGASTRODUODENOSCOPY N/A 10/11/2022    Procedure: EGD (ESOPHAGOGASTRODUODENOSCOPY);  Surgeon: Dequan Ridley MD;  Location: [address]);  Service: Endoscopy;   Laterality: N/A;  She is do for EGD/EUS now. Personal history of gastric cancer. Ca Coats #[id number].   Thanks,   Kevin Carballo. MD    Pt is fully vaccinated-DS  9/14/22-Approval to hold Eliquis rec'd from Dr. Pelaez (see telephone encounter 9/14/22)-DS  9/14/22-Ins    ESOPHAGOGASTRODUODENOSCOPY N/A 01/24/2024    Procedure: EGD (ESOPHAGOGASTRODUODENOSCOPY);  Surgeon: Kevin Carballo MD;  Location: [facility] ENDO (2ND FLR);  Service: Endoscopy;  Laterality: N/A;  12/8/23: instructions sent via portal. eliquis approval from MD Pelaez in telephone encounter (12/12/23) -GD  1/9-precall complete-MS    ESOPHAGOGASTRODUODENOSCOPY N/A 03/05/2024    Procedure: EGD (ESOPHAGOGASTRODUODENOSCOPY);  Surgeon: Kevin Carballo MD;  Location: [facility] ENDO (2ND FLR);  Service: Endoscopy;  Laterality: N/A;    EYE SURGERY      HYSTEROSCOPIC POLYPECTOMY OF UTERUS  01/10/2024    Procedure: POLYPECTOMY, UTERUS, HYSTEROSCOPIC;  Surgeon: Pina Pickens MD;  Location: [facility] OR;  Service: OB/GYN;;    HYSTEROSCOPY WITH DILATION AND CURETTAGE OF UTERUS N/A 01/10/2024    Procedure: HYSTEROSCOPY, WITH DILATION AND CURETTAGE OF UTERUS;  Surgeon: Pina Pickens MD;  Location: [facility] OR;  Service: OB/GYN;  Laterality: N/A;    INJECTION OF ANESTHETIC AGENT AROUND NERVE Left 07/10/2018    Procedure: BLOCK, NERVE;  Surgeon: Samantha Vidal MD;  Location: [facility] PAIN MGT;  Service: Pain Management;  Laterality: Left;  Genicular     INJECTION OF ANESTHETIC AGENT AROUND NERVE Left 07/19/2019    Procedure: Block, Nerve NERVE BLOCK GENICULAR WITH PHENOL 6%;  Surgeon: Samantha Vidal MD;  Location: [facility] PAIN MGT;  Service: Pain Management;  Laterality: Left;  NEEDS CONSENT    INSERTION OF TUNNELED CENTRAL VENOUS CATHETER (CVC) WITH SUBCUTANEOUS PORT N/A 07/09/2021    Procedure: INSERTION, PORT-A-CATH;  Surgeon: Mario Paz MD;  Location: [facility] CATH LAB;  Service: Radiology;  Laterality: N/A;  PORT PLACEMENT    RADIOFREQUENCY ABLATION Left  2020    Procedure: RADIOFREQUENCY ABLATION LEFT GENICULAR;  Surgeon: Tevin Clark MD;  Location: [facility] PAIN MGT;  Service: Pain Management;  Laterality: Left;  Left Genicular RFA    RADIOFREQUENCY ABLATION Left 2021    Procedure: RADIOFREQUENCY ABLATION LEFT GENICULAR;  Surgeon: Tevin Clark MD;  Location: [facility] PAIN MGT;  Service: Pain Management;  Laterality: Left;    RADIOFREQUENCY ABLATION Left 2021    Procedure: RADIOFREQUENCY ABLATION, GENICULAR COOLED NEED CONSENT;  Surgeon: Tevin Clark MD;  Location: [facility] PAIN MGT;  Service: Pain Management;  Laterality: Left;    RADIOFREQUENCY ABLATION Left 2022    Procedure: RADIOFREQUENCY ABLATION, GENICULAR COOLED , LEFT;  Surgeon: Tevin Clark MD;  Location: [facility] PAIN MGT;  Service: Pain Management;  Laterality: Left;    RADIOFREQUENCY ABLATION Left 2022    Procedure: RADIOFREQUENCY ABLATION LEFT GENICULAR COOLED CLEARED TO HOLD ELIQUIS 3 DAYS  NEEDS CONSENT;  Surgeon: Tevin Clark MD;  Location: [facility] PAIN MGT;  Service: Pain Management;  Laterality: Left;    TONSILLECTOMY       Family History   Problem Relation Name Age of Onset    No Known Problems Mother      No Known Problems Father      Colon cancer Neg Hx      Esophageal cancer Neg Hx       Social History     Tobacco Use    Smoking status: Some Days     Current packs/day: 0.00     Average packs/day: 1 pack/day for 53.9 years (53.7 ttl pk-yrs)     Types: Cigarettes     Start date:      Last attempt to quit: 2023     Years since quittin.0    Smokeless tobacco: Never    Tobacco comments:     currently smoking <5 cigarettes most days   Substance Use Topics    Alcohol use: Yes     Alcohol/week: 1.0 standard drink of alcohol     Types: 1 Glasses of wine per week     Comment: rarely    Drug use: No     Review of Systems    Physical Exam     Initial Vitals [24 1339]   BP Pulse Resp Temp SpO2   125/64 83 20 98.2 °F (36.8 °C) 98 %      MAP       --          Physical Exam    Nursing note and vitals reviewed.  Constitutional: She appears well-developed and well-nourished. No distress.   HENT:   Head: Normocephalic and atraumatic.   Left upper facial droop   No lower facial droop.  No rash.   Eyes: Conjunctivae and EOM are normal. Pupils are equal, round, and reactive to light.   Neck:   Normal range of motion.  Cardiovascular:  Normal rate, regular rhythm, normal heart sounds and intact distal pulses.     Exam reveals no gallop and no friction rub.       No murmur heard.  Pulmonary/Chest: Breath sounds normal. No respiratory distress. She has no wheezes. She has no rhonchi. She has no rales.   Abdominal: Abdomen is soft. She exhibits no distension. There is no abdominal tenderness. There is no rebound and no guarding.   Musculoskeletal:         General: No tenderness or edema. Normal range of motion.      Cervical back: Normal range of motion.     Neurological: She is alert and oriented to person, place, and time. She has normal strength.   Left upper facial droop and ptosis.  EOMI.  Visual fields intact   Equal strength and sensation in bilateral upper and lower extremities  No drift in bilateral upper or lower extremities  Normal finger-to-nose bilaterally  No aphasia or dysarthria  No extinction   Skin: Skin is warm and dry. Capillary refill takes less than 2 seconds.   Psychiatric: She has a normal mood and affect.         ED Course   Procedures  Labs Reviewed   CBC W/ AUTO DIFFERENTIAL - Abnormal       Result Value    WBC 9.54      RBC 3.30 (*)     Hemoglobin 10.9 (*)     Hematocrit 33.8 (*)      (*)     MCH 33.0 (*)     MCHC 32.2      RDW 12.9      Platelets 248      MPV 10.5      Immature Granulocytes 0.4      Gran # (ANC) 6.4      Immature Grans (Abs) 0.04      Lymph # 2.2      Mono # 0.8      Eos # 0.1      Baso # 0.02      nRBC 0      Gran % 67.0      Lymph % 23.0      Mono % 8.8      Eosinophil % 0.6      Basophil % 0.2      Differential Method  Automated     COMPREHENSIVE METABOLIC PANEL - Abnormal    Sodium 139      Potassium 4.2      Chloride 108      CO2 22 (*)     Glucose 133 (*)     BUN 18      Creatinine 0.8      Calcium 9.9      Total Protein 6.4      Albumin 2.8 (*)     Total Bilirubin 0.3      Alkaline Phosphatase 125      AST 17      ALT 16      eGFR >60.0      Anion Gap 9     LIPID PANEL - Abnormal    Cholesterol 125      Triglycerides 76      HDL 49      LDL Cholesterol 60.8 (*)     HDL/Cholesterol Ratio 39.2      Total Cholesterol/HDL Ratio 2.6      Non-HDL Cholesterol 76     POCT GLUCOSE - Abnormal    POCT Glucose 133 (*)    PROTIME-INR    Prothrombin Time 11.4      INR 1.0     TSH    TSH 0.636     TROPONIN I HIGH SENSITIVITY    Troponin I High Sensitivity 5     POCT GLUCOSE, HAND-HELD DEVICE   ISTAT PROCEDURE    POC PTWBT 12.6      POC PTINR 1.2      Sample unknown     ISTAT CREATININE    POC Creatinine 0.8      Sample unknown            Imaging Results               CTA STROKE MULTI-PHASE (Final result)  Result time 12/29/24 14:30:05      Final result by Sinan Albright MD (12/29/24 14:30:05)                   Impression:      1. Relative to prior head CT performed 12/05/2024, continued maturing postoperative appearance status post left-sided craniotomy for clipping of left ICA and left MCA aneurysms.  Extra-axial fluid collection superficial to the duraplasty material measuring on the order of 6-7 mm in thickness with mild mass effect upon the subjacent sulci and gyri. Overall decreased attenuation of this collection compared to the prior CT, noting of small amount of residual hyperattenuating postoperative blood products anteriorly.  2. No definite evidence of acute transcortical infarct by CT.  MRI may be considered for more sensitive and specific assessment if there is continued clinical concern for acute ischemia or recent infarction.  3. Noting limitations imposed by artifact, no definite residual or recurrent ICA or MCA aneurysm  status post clipping.  4. There is suggestion of mild/mild-to-moderate luminal irregularity and narrowing of the distal right M1 and proximal M2 MCA branches, appearing more pronounced involving the superior division branches. No definite complete vessel occlusion is suggested.  While appearance may be artifactual due to artifact from adjoining aneurysm clips, spasm difficult to entirely exclude.  5. No short-term change in the appearance of the unruptured multilobulated basilar apex aneurysm relative to most recent CTA performed 11/21/2024.  6. Additional details, as provided in the body of report.  This report was flagged in Epic as abnormal.      Electronically signed by: Sinan Albright  Date:    12/29/2024  Time:    14:30               Narrative:    EXAMINATION:  CTA STROKE MULTI-PHASE    CLINICAL HISTORY:  Neuro deficit, acute, stroke suspected;    TECHNIQUE:  Non contrast low dose axial images were obtained thought the head. CT angiogram was performed from the level of the johan to the top of the head following the IV administration of 100mL of Omnipaque 350.   Sagittal and coronal reconstructions and maximum intensity projection reconstructions were performed. Arterial stenosis percentages are based on NASCET measurement criteria.  Two additional phases of immediate post-contrast CTA images were performed through the head alone.    COMPARISON:  None    FINDINGS:  CT HEAD:    Parenchyma/extra-axial space:    No definite loss of gray-white differentiation to suggest acute or subacute transcortical infarct.    Relative to prior head CT performed 12/05/2024, continued maturing postoperative appearance status post left-sided craniotomy for clipping of left ICA and left MCA aneurysms.  Extra-axial fluid collection superficial to the duraplasty material measuring on the order of 6-7 mm in thickness with mild mass effect upon the subjacent sulci and gyri.  Overall decreased attenuation of this collection compared to  the prior CT, noting of small amount of residual hyperattenuating postoperative blood products anteriorly (for example as seen on axial series 2, image 19).    Continued maturation of previously seen hemorrhage within the right superior cerebellum with early encephalomalacia/gliosis.  Overall, no definite new or enlarging areas of intracranial hemorrhage are appreciated.    A generalized pattern of age-related parenchymal volume loss is noted elsewhere.  Areas of nonspecific white matter hypoattenuation may reflect sequela of chronic small vessel ischemic disease.    Ventricles/Extra-axial spaces: As above.  Unchanged supratentorial ventricular size and configuration.  Interval re-expansion of the 4th ventricle related to decreased edema within the posterior fossa.  Basal cisterns remain patent.    Vessels: Left MCA aneurysm clipping sequela.  Mild-to-moderate atherosclerotic calcification.    Orbits: Status post bilateral lens replacements.    Scalp: Unremarkable.    Skull: There are no depressed skull fractures or destructive bone lesions.    Sinuses and mastoids: Essentially clear.    Other findings: None    CTA:    Comment: Study limited by suboptimal contrast bolus.    NECK:    Imaged aortic arch: Nonaneurysmal.  Left-sided arch.  Conventional 3 vessel arch anatomy.  Moderate atherosclerosis.  Gross patency of subclavian and brachiocephalic arteries.    Right common and cervical internal carotid arteries: CCA and ECA appear grossly patent less than 50% atheromatous narrowing at the proximal ICA.  Medialized retropharyngeal course of the ICA.  No definite evidence of carotid dissection or web.    Left common and cervical internal carotid arteries: Gross patency of the CCA and ECA.  Less than 50% atheromatous narrowing at the proximal ICA.  Medialized retropharyngeal course of the ICA.  No definite evidence of carotid dissection or web.    Right cervical vertebral artery: Limited assessment at the vertebral  artery origin due to artifact.  Noting this, there is no hemodynamically significant stenosis or dissection    Left cervical vertebral artery: There is no hemodynamically significant stenosis or dissection.    HEAD:    Right anterior circulation:Redemonstrated multifocal mild-to-moderate atheromatous irregularity of the ICA.  Right ophthalmic artery is patent.No definite evidence of acute large vessel occlusion or flow-limiting stenosis.    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  No definite flow-limiting stenosis of DIANE branches.  Noting limitations imposed by artifact, no definite residual or recurrent ICA or MCA aneurysm status post clipping.  There is suggestion of mild/mild-to-moderate luminal irregularity and narrowing of the distal right M1 and proximal M2 MCA branches, appearing more pronounced involving the superior division branches.  No definite complete vessel occlusion is suggested.    Posterior circulation: There is no hemodynamically significant vertebrobasilar stenosis. There is no significant PCA stenosis. Posterior inferior cerebellar arteries patent at origin.  Relative to prior CTA performed 11/21/2024, no definite change in appearance of the lobulated basilar apex aneurysm measuring on the order of 6 mm.    Anterior and posterior communicating arteries: Patent anterior communicating artery.  Posterior communicating arteries not well visualized.    NON-ANGIOGRAPHIC FINDINGS:    Visualized intracranial contents: As above.    Soft tissues of the neck: Tunnel right chest port.  Sub 15 mm calcified nodule in the right lobe of the thyroid gland.    Visualized sinuses: As above.    Dentition: Edentulous.    Spine: Degenerative changes of the spine.    Visualized lungs: Fibrotic changes in the lungs again noted.  9-10 mm solid nodule left upper lobe  (series 5, image 19).  This appears grossly similar to 11/12/2024 chest CT.                                       Medications   valACYclovir tablet 1,000 mg (has no administration in time range)   iohexoL (OMNIPAQUE 350) injection 100 mL (100 mLs Intravenous Given 12/29/24 0720)     Medical Decision Making  74-year-old female who presents with headache and left upper facial droop.  She has a recent complicated history with bleed.  On exam here, she has a left upper forehead droop and ptosis on the left.  No lower facial droop brother neuro deficits.  Exam and history is consistent with Bell's palsy however she does have a complicated stroke history.  Differential includes bleed versus stroke versus Bell's palsy.  No rash on the face.  Code stroke called.  Patient taken to CT scanner.  Vital signs stable otherwise.    Stroke is seen and they are some evolving findings but nothing severe or acute.  They feel like her causes likely secondary to Bell's palsy and recommend steroids, valacyclovir, headache management but is safe to return to nursing home. Discussed with family member    Amount and/or Complexity of Data Reviewed  Labs: ordered.  Radiology: ordered.    Risk  Prescription drug management.                                      Clinical Impression:  Final diagnoses:  [R29.818] Acute focal neurological deficit  [G51.0] Bell's palsy (Primary)  [R51.9] Nonintractable headache, unspecified chronicity pattern, unspecified headache type          ED Disposition Condition    Discharge Stable          ED Prescriptions       Medication Sig Dispense Start Date End Date Auth. Provider    valACYclovir (VALTREX) 1000 MG tablet Take 1 tablet (1,000 mg total) by mouth 2 (two) times daily. for 7 days 14 tablet 12/29/2024 1/5/2025 Johanna Nichols MD          Follow-up Information    None          Johanna Nichols MD  12/29/24 [id number]

## 2024-12-29 NOTE — DISCHARGE INSTRUCTIONS
You were seen by vascular Neurology and had imaging done of your head.  Based on your symptoms and imaging, they feel this is more likely secondary to Bell's palsy and recommend steroids and antivirals which we have prescribed.  The 1st dose was given here.  They felt comfortable with you going back to the nursing home.  Please return if any other concerns or worsening.    You should also tape your eye shut at night to avoid trauma and use lubricating eye drops as your eye may dry out if you have difficulty closing it.

## 2024-12-30 NOTE — ASSESSMENT & PLAN NOTE
Ca Coats is a 74 y.o. female with PMH of HTN, HLD, T2DM, prior stroke (residual BLE weakness) that presents to the ED c/o HA. LKW 12/29/24 at 1100. Exam significant for BLE weakness which is unchanged from baseline per her daughter at bedside. Pt arrived to [facility] ED, Code stroke activated. NIHSS 4 for BLE weakness. CTH/CTA stroke multiphase negative for acute intracranial findings and negative for LVO. Determined not a candidate for acute stroke interventions, no TNK due to low NIHSS and no thrombectomy due to no LVO and nondisabling symptoms. On exam, left upper facial paralysis and left eye redness. No focal deficits appreciated on exam. Considering physical exam and CTH/CTA findings, there is low suspicion for stroke.     Recommendations:  --Etiology of clinical presentation suspicious for Bell's palsy  --Recommend Valtrex 1000 mg BID  7 days   --No further stroke workup recommended at this time, VN will sign off.

## 2024-12-30 NOTE — CONSULTS
[facility] - Emergency Dept  Vascular Neurology  [facility]  Consult Note    Consults  Assessment/Plan:     Patient is a 74 y.o. year old female with:    Headache  Ca Coats is a 74 y.o. female with PMH of HTN, HLD, T2DM, prior stroke (residual BLE weakness) that presents to the ED c/o HA. LKW 12/29/24 at 1100. Exam significant for BLE weakness which is unchanged from baseline per her daughter at bedside. Pt arrived to [facility] ED, Code stroke activated. NIHSS 4 for BLE weakness. CTH/CTA stroke multiphase negative for acute intracranial findings and negative for LVO. Determined not a candidate for acute stroke interventions, no TNK due to low NIHSS and no thrombectomy due to no LVO and nondisabling symptoms. On exam, left upper facial paralysis and left eye redness. No focal deficits appreciated on exam. Considering physical exam and CTH/CTA findings, there is low suspicion for stroke.     Recommendations:  --Etiology of clinical presentation suspicious for Bell's palsy  --Recommend Valtrex 1000 mg BID  7 days   --No further stroke workup recommended at this time, VN will sign off.             STROKE DOCUMENTATION     Acute Stroke Times   Last Known Normal Date: 12/29/24  Last Known Normal Time: 1100  Symptom Onset Date: 12/29/24  Symptom Onset Time: 1100  Stroke Team Called Date: 12/29/24  Stroke Team Called Time: 1340  Stroke Team Arrival Date: 12/29/24  Stroke Team Arrival Time: 1340  CT Interpretation Time: 1357  Thrombolytic Therapy Recommended: No  CTA Interpretation Time: 1400  Thrombectomy Recommended: No    NIH Scale:  1a. Level of Consciousness: 0-->Alert, keenly responsive  1b. LOC Questions: 0-->Answers both questions correctly  1c. LOC Commands: 0-->Performs both tasks correctly  2. Best Gaze: 0-->Normal  3. Visual: 0-->No visual loss  4. Facial Palsy: 0-->Normal symmetrical movements  5a. Motor Arm, Left: 0-->No drift, limb holds 90 (or 45) degrees for full 10 secs  5b. Motor  Arm, Right: 0-->No drift, limb holds 90 (or 45) degrees for full 10 secs  6a. Motor Leg, Left: 2-->Some effort against gravity, leg falls to bed by 5 secs, but has some effort against gravity  6b. Motor Leg, Right: 2-->Some effort against gravity, leg falls to bed by 5 secs, but has some effort against gravity  7. Limb Ataxia: 0-->Absent  8. Sensory: 0-->Normal, no sensory loss  9. Best Language: 0-->No aphasia, normal  10. Dysarthria: 0-->Normal  11. Extinction and Inattention (formerly Neglect): 0-->No abnormality  Total (NIH Stroke Scale): 4    Modified [place] Coma Scale:15   ABCD2 Score:    HPAV3VG0-DIG Score:   HAS -BLED Score:   ICH Score:   Hunt & Fitzpatrick Classification:       Thrombolysis Candidate? No, Non-disabling symptoms - Low NIHSS , Strong suspicion for stroke mimic or alternative diagnosis     Delays to Thrombolysis?  Not Applicable    Interventional Revascularization Candidate?   Is the patient eligible for mechanical endovascular reperfusion (MARLEN)?  No; no significant neurologic deficit (NIHSS <6)     Delays to Thrombectomy? Not Applicable    Hemorrhagic change of an Ischemic Stroke: Does this patient have an ischemic stroke with hemorrhagic changes? No     Subjective:     History of Present Illness:  Ca Coats is a 74 y.o. female with PMH of HTN, HLD, T2DM, prior stroke (residual BLE weakness) that presents to the ED c/o HA. LKW 12/29/24 at 1100. Exam significant for BLE weakness which is unchanged from baseline per her daughter at bedside. Pt arrived to [facility] [place] ED, Code stroke activated. NIHSS 4 for BLE weakness. CTH/CTA stroke multiphase negative for acute intracranial findings and negative for LVO. Determined not a candidate for acute stroke interventions, no TNK due to low NIHSS and no thrombectomy due to no LVO and nondisabling symptoms. On exam, left upper facial paralysis and left eye redness. No focal deficits appreciated on exam. Considering physical exam and CTH/CTA  findings, there is low suspicion for stroke.           Past Medical History:   Diagnosis Date    YOUNG (acute kidney injury) 10/15/2021    Anemia     2018    Arthritis     BMI 60.0-69.9, adult     Cataract     Chronic anticoagulation 11/27/2024    DVT in currently on Eliquis     Chronic diastolic congestive heart failure 01/27/2021    Coronary artery disease involving native coronary artery of native heart without angina pectoris 11/15/2024    Deep vein thrombosis     Depression     Dry eye syndrome     DVT of deep femoral vein, right 05/29/2023    Gastric adenocarcinoma 05/25/2021    GERD (gastroesophageal reflux disease)     Glaucoma suspect     History of gastric ulcer     History of psychiatric hospitalization     Hx of psychiatric care     Celexa, no recall of charted Effexor, Lexapro, Desyrel prescriptions    Hyperlipidemia     Hypertension     Hypothyroidism     Morbid obesity     Neuromuscular disorder     Sleep apnea     Thyroid disease      Past Surgical History:   Procedure Laterality Date    APPENDECTOMY      CATARACT EXTRACTION W/  INTRAOCULAR LENS IMPLANT Bilateral     MFIOL OU    CLIP LIGATION OF INTRACRANIAL ANEURYSM BY CRANIOTOMY Left 11/20/2024    Procedure: CRANIOTOMY, WITH ANEURYSM CLIPPING W/ STEALTH;  Surgeon: Sejal Simon MD;  Location: [facility] OR [address] [place] [address];  Service: Neurosurgery;  Laterality: Left;  left pterioral craniotomy with microsurgical clip ligation of mca and anterior chroidal aneurysm, dr. ginna zhou surgeon, scalp block, type and cross 2 units, neuromonitoring sep,mep,eeg, regular bed, [place], supine spencer, icu pos    CORONARY ANGIOGRAPHY Bilateral 02/24/2021    Procedure: ANGIOGRAM, CORONARY ARTERY;  Surgeon: Cresencio Ridley MD;  Location: [facility] CATH LAB;  Service: Cardiology;  Laterality: Bilateral;  Covid test needed - ok per Danay SSCU. Pt. w/o transportation or family    CRANIOTOMY, FOR ANEURYSM OF VERTEBROBASILAR OR CAROTID CIRCULATION Left 11/20/2024     Procedure: CRANIOTOMY, FOR ANEURYSM OF VERTEBROBASILAR OR CAROTID CIRCULATION;  Surgeon: Sejal Simon MD;  [facility];  Service: Neurosurgery;  Laterality: Left;  with scalp block    DISSECTION, NERVE, USING OPERATING MICROSCOPE Left 11/20/2024    Procedure: DISSECTION, NERVE, USING OPERATING MICROSCOPE;  Surgeon: Sejal Simon MD;  Location: [facility] OR 2ND FLR;  Service: Neurosurgery;  Laterality: Left;  with scalp block    ENDOSCOPIC ULTRASOUND OF UPPER GASTROINTESTINAL TRACT N/A 03/17/2021    Procedure: ULTRASOUND, UPPER GI TRACT, ENDOSCOPIC;  Surgeon: Gerald Anaya MD;  Location: [facility] (2ND FLR);  Service: Endoscopy;  Laterality: N/A;  Pt unable to get a ride for swab. Will do rapid test at 8:30 - ttr    ENDOSCOPIC ULTRASOUND OF UPPER GASTROINTESTINAL TRACT N/A 01/13/2022    Procedure: ULTRASOUND, UPPER GI TRACT, ENDOSCOPIC;  Surgeon: Kevin Carballo MD;  [facility]  Service: Endoscopy;  Laterality: N/A;  blood thinner approval received, see telephone encounter 1/7/22-BB  rapid  instructions given verbally and sent to address on file in pt's chart-BB    ENDOSCOPIC ULTRASOUND OF UPPER GASTROINTESTINAL TRACT N/A 10/11/2022    Procedure: ULTRASOUND, UPPER GI TRACT, ENDOSCOPIC;  Surgeon: Dequan Ridley MD;  Location: [facility] ENDO (2ND FLR);  Service: Endoscopy;  Laterality: N/A;    ENDOSCOPIC ULTRASOUND OF UPPER GASTROINTESTINAL TRACT N/A 01/24/2024    Procedure: ULTRASOUND, UPPER GI TRACT, ENDOSCOPIC;  Surgeon: Kevin Carballo MD;  Location: [facility] ENDO (2ND FLR);  Service: Endoscopy;  Laterality: N/A;    ENDOSCOPIC ULTRASOUND OF UPPER GASTROINTESTINAL TRACT N/A 03/05/2024    Procedure: ULTRASOUND, UPPER GI TRACT, ENDOSCOPIC;  Surgeon: Kevin Carballo MD;  [facility]  Service: Endoscopy;  Laterality: N/A;  1/25 portal instr-Repeat theEUS at the next available appointment because the preparation was poor. 48hrs of liquid. Ozempic 7 day  hold-eliquis approved Dr. Pelaez TE 12/12/2023-tt  2/28-precall complete-pt verbalized udnerstanding of holding Oz    ESOPHAGOGASTRODUODENOSCOPY N/A 02/05/2021    Procedure: EGD (ESOPHAGOGASTRODUODENOSCOPY);  Surgeon: Matthew eHrnadez MD;  Location: [facility] ENDO (2ND FLR);  Service: Endoscopy;  Laterality: N/A;  BMI-58    Wt: 361#     COVID test at W on 2/2-GT    ESOPHAGOGASTRODUODENOSCOPY N/A 03/17/2021    Procedure: EGD (ESOPHAGOGASTRODUODENOSCOPY);  Surgeon: Gerald Anaya MD;  Location: [facility] ENDO (2ND FLR);  Service: Endoscopy;  Laterality: N/A;    ESOPHAGOGASTRODUODENOSCOPY N/A 05/25/2021    Procedure: EGD (ESOPHAGOGASTRODUODENOSCOPY);  Surgeon: Kevin Carballo MD;  Location: [facility] ENDO (2ND FLR);  Service: Endoscopy;  Laterality: N/A;  ESD 2 hours  Full COVID vaccination on file. ttr    ESOPHAGOGASTRODUODENOSCOPY N/A 01/13/2022    Procedure: EGD (ESOPHAGOGASTRODUODENOSCOPY);  Surgeon: Kevin Carballo MD;  Location: [facility] ENDO (2ND FLR);  Service: Endoscopy;  Laterality: N/A;  blood thinner approval, see telephone encounter 1/7/22-BB  rapid  instructions given verbally and sent to address on file in pt's chart-BB    ESOPHAGOGASTRODUODENOSCOPY N/A 10/11/2022    Procedure: EGD (ESOPHAGOGASTRODUODENOSCOPY);  Surgeon: Dequan Ridley MD;  Location: [facility] ENDO (2ND FLR);  Service: Endoscopy;  Laterality: N/A;  She is do for EGD/EUS now. Personal history of gastric cancer. Ca Coats #[id number].   Thanks,   Kevin Fuentes MD    Pt is fully vaccinated-DS  9/14/22-Approval to quiana [place] rec'd from Dr. Pelaez (see telephone encounter 9/14/22)-DS  9/14/22-Ins    ESOPHAGOGASTRODUODENOSCOPY N/A 01/24/2024    Procedure: EGD (ESOPHAGOGASTRODUODENOSCOPY);  Surgeon: Kevin Carballo MD;  Location: [facility] ([address] [place] [address]);  Service: Endoscopy;  Laterality: N/A;  12/8/23: instructions sent via portal. eliquis approval from MD Pelaez in telephone encounter (12/12/23) -GD  1/9-precall complete-MS     ESOPHAGOGASTRODUODENOSCOPY N/A 03/05/2024    Procedure: EGD (ESOPHAGOGASTRODUODENOSCOPY);  Surgeon: Kevin Carballo MD;  Location: [address]);  Service: Endoscopy;  Laterality: N/A;    EYE SURGERY      HYSTEROSCOPIC POLYPECTOMY OF UTERUS  01/10/2024    Procedure: POLYPECTOMY, UTERUS, HYSTEROSCOPIC;  Surgeon: Pina Picekns MD;  Location: [facility] OR;  Service: OB/GYN;;    HYSTEROSCOPY WITH DILATION AND CURETTAGE OF UTERUS N/A 01/10/2024    Procedure: HYSTEROSCOPY, WITH DILATION AND CURETTAGE OF UTERUS;  Surgeon: Pina Pickens MD;  Location: [facility] OR;  Service: OB/GYN;  Laterality: N/A;    INJECTION OF ANESTHETIC AGENT AROUND NERVE Left 07/10/2018    Procedure: BLOCK, NERVE;  Surgeon: Samantha Vidal MD;  Location: [facility] PAIN MGT;  Service: Pain Management;  Laterality: Left;  Genicular     INJECTION OF ANESTHETIC AGENT AROUND NERVE Left 07/19/2019    Procedure: Block, Nerve NERVE BLOCK GENICULAR WITH PHENOL 6%;  Surgeon: Samantha Vidal MD;  Location: [facility] PAIN MGT;  Service: Pain Management;  Laterality: Left;  NEEDS CONSENT    INSERTION OF TUNNELED CENTRAL VENOUS CATHETER (CVC) WITH SUBCUTANEOUS PORT N/A 07/09/2021    Procedure: INSERTION, PORT-A-CATH;  Surgeon: Mario Paz MD;  Location: [facility] CATH LAB;  Service: Radiology;  Laterality: N/A;  PORT PLACEMENT    RADIOFREQUENCY ABLATION Left 06/19/2020    Procedure: RADIOFREQUENCY ABLATION LEFT GENICULAR;  Surgeon: Tevin Clark MD;  Location: [facility] PAIN MGT;  Service: Pain Management;  Laterality: Left;  Left Genicular RFA    RADIOFREQUENCY ABLATION Left 01/22/2021    Procedure: RADIOFREQUENCY ABLATION LEFT GENICULAR;  Surgeon: Tevin Clark MD;  Location: [facility] PAIN MGT;  Service: Pain Management;  Laterality: Left;    RADIOFREQUENCY ABLATION Left 07/30/2021    Procedure: RADIOFREQUENCY ABLATION, GENICULAR COOLED NEED CONSENT;  Surgeon: Tevin Clark MD;  Location: [facility] PAIN MGT;  Service: Pain Management;  Laterality: Left;     RADIOFREQUENCY ABLATION Left 2022    Procedure: RADIOFREQUENCY ABLATION, GENICULAR COOLED , LEFT;  Surgeon: Tevin Clark MD;  Location: [facility];  Service: Pain Management;  Laterality: Left;    RADIOFREQUENCY ABLATION Left 2022    Procedure: RADIOFREQUENCY ABLATION LEFT GENICULAR COOLED CLEARED TO HOLD ELIQUIS 3 DAYS  NEEDS CONSENT;  Surgeon: Tevin Clark MD;  Location: [facility] PAIN T;  Service: Pain Management;  Laterality: Left;    TONSILLECTOMY       Social History     Tobacco Use    Smoking status: Some Days     Current packs/day: 0.00     Average packs/day: 1 pack/day for 53.9 years (53.7 ttl pk-yrs)     Types: Cigarettes     Start date:      Last attempt to quit: 2023     Years since quittin.0    Smokeless tobacco: Never    Tobacco comments:     currently smoking <5 cigarettes most days   Substance Use Topics    Alcohol use: Yes     Alcohol/week: 1.0 standard drink of alcohol     Types: 1 Glasses of wine per week     Comment: rarely    Drug use: No     Review of patient's allergies indicates:  No Known Allergies    Medications: I have reviewed the current medication administration record.    (Not in a hospital admission)      Review of Systems   Constitutional: Negative.    Respiratory: Negative.     Cardiovascular: Negative.    Neurological:  Positive for headaches.     Objective:     Vital Signs (Most Recent):  Temp: 98.2 °F (36.8 °C) (24 1339)  Pulse: 74 (24 1520)  Resp: 19 (24 1520)  BP: (!) 119/59 (24 1520)  SpO2: 96 % (24 1520)    Vital Signs Range (Last 24H):  Temp:  [98.2 °F (36.8 °C)]   Pulse:  [74-83]   Resp:  [18-21]   BP: (119-129)/(59-69)   SpO2:  [94 %-98 %]        Physical Exam  Constitutional:       Appearance: She is obese.   HENT:      Mouth/Throat:      Mouth: Mucous membranes are moist.   Eyes:      Pupils: Pupils are equal, round, and reactive to light.   Cardiovascular:      Rate and Rhythm: Normal rate.   Pulmonary:      " Effort: No respiratory distress.   Skin:     General: Skin is warm and dry.   Neurological:      Mental Status: She is alert.              Neurological Exam:   LOC: alert  Attention Span: Good   Language: No aphasia  Articulation: No dysarthria  Orientation: Person, Place, Time   Facial Movement (CN VII): Upper facial weakness on the Left  Motor: Arm left  Normal 5/5  Leg left  Paresis: 4/5  Arm right  Normal 5/5  Leg right Paresis: 4/5      Laboratory:  CMP:   Recent Labs   Lab 12/29/24  1355   CALCIUM 9.9   ALBUMIN 2.8*   PROT 6.4      K 4.2   CO2 22*      BUN 18   CREATININE 0.8   ALKPHOS 125   ALT 16   AST 17   BILITOT 0.3     CBC:   Recent Labs   Lab 12/29/24  1355   WBC 9.54   RBC 3.30*   HGB 10.9*   HCT 33.8*      *   MCH 33.0*   MCHC 32.2     Lipid Panel:   Recent Labs   Lab 12/29/24  1355   CHOL 125   LDLCALC 60.8*   HDL 49   TRIG 76     Coagulation:   Recent Labs   Lab 12/29/24  1419   INR 1.2     Hgb A1C: No results for input(s): "HGBA1C" in the last 168 hours.  TSH:   Recent Labs   Lab 12/29/24  1355   TSH 0.636       Diagnostic Results:    Brain imaging/Vessel Imaging:  Impression:     1. Relative to prior head CT performed 12/05/2024, continued maturing postoperative appearance status post left-sided craniotomy for clipping of left ICA and left MCA aneurysms.  Extra-axial fluid collection superficial to the duraplasty material measuring on the order of 6-7 mm in thickness with mild mass effect upon the subjacent sulci and gyri. Overall decreased attenuation of this collection compared to the prior CT, noting of small amount of residual hyperattenuating postoperative blood products anteriorly.  2. No definite evidence of acute transcortical infarct by CT.  MRI may be considered for more sensitive and specific assessment if there is continued clinical concern for acute ischemia or recent infarction.  3. Noting limitations imposed by artifact, no definite residual or recurrent " ICA or MCA aneurysm status post clipping.  4. There is suggestion of mild/mild-to-moderate luminal irregularity and narrowing of the distal right M1 and proximal M2 MCA branches, appearing more pronounced involving the superior division branches. No definite complete vessel occlusion is suggested.  While appearance may be artifactual due to artifact from adjoining aneurysm clips, spasm difficult to entirely exclude.  5. No short-term change in the appearance of the unruptured multilobulated basilar apex aneurysm relative to most recent CTA performed 11/21/2024.          Maggie Joyce NP  [facility]  Department of Vascular Neurology   [facility] - Emergency Dept

## 2025-01-13 DIAGNOSIS — Z00.00 ENCOUNTER FOR MEDICARE ANNUAL WELLNESS EXAM: ICD-10-CM

## 2025-01-24 ENCOUNTER — HOSPITAL ENCOUNTER (OUTPATIENT)
Dept: RADIOLOGY | Facility: HOSPITAL | Age: 75
Discharge: HOME | End: 2025-01-24
Attending: STUDENT IN AN ORGANIZED HEALTH CARE EDUCATION/TRAINING PROGRAM
Payer: MEDICARE

## 2025-01-24 PROCEDURE — 76700 US EXAM ABDOM COMPLETE: CPT | Mod: 26,HCNC,, | Performed by: RADIOLOGY

## 2025-01-26 ENCOUNTER — HOSPITAL ENCOUNTER (OUTPATIENT)
Dept: RADIOLOGY | Facility: HOSPITAL | Age: 75
Discharge: HOME | End: 2025-01-26
Attending: INTERNAL MEDICINE
Payer: MEDICARE

## 2025-01-26 PROCEDURE — 74018 RADEX ABDOMEN 1 VIEW: CPT | Mod: 26,HCNC,, | Performed by: RADIOLOGY

## 2025-02-05 ENCOUNTER — TELEPHONE (OUTPATIENT)
Dept: ENDOSCOPY | Facility: HOSPITAL | Age: 75
End: 2025-02-05
Payer: MEDICARE

## 2025-02-05 NOTE — TELEPHONE ENCOUNTER
Telephone patient to schedule EUS with no answer. Direct contact left to call back to schedule procedure.

## 2025-02-21 ENCOUNTER — TELEPHONE (OUTPATIENT)
Dept: HEMATOLOGY/ONCOLOGY | Facility: CLINIC | Age: 75
End: 2025-02-21
Payer: MEDICARE

## 2025-02-21 NOTE — TELEPHONE ENCOUNTER
I called the patient on     [phone]   No answered.  I couldn't leave a voicemail. A message came on saying. Sorry no one can take your call. Please try again.

## 2025-02-21 NOTE — TELEPHONE ENCOUNTER
I called the daughter Jose Coats @ [phone]. It went straight to voicemail. I left a message letting her know that I was calling to get in touch regarding Ms Isabel Coats F/U Appts with [facility], and that she can reach me at my direct line number. Which  left on the voicemail.

## 2025-02-21 NOTE — TELEPHONE ENCOUNTER
I called the patient on [phone] . A message came on saying I dialed a number that is no working.

## 2025-02-24 ENCOUNTER — TELEPHONE (OUTPATIENT)
Dept: ENDOSCOPY | Facility: HOSPITAL | Age: 75
End: 2025-02-24
Payer: MEDICARE

## 2025-02-24 NOTE — TELEPHONE ENCOUNTER
Telephoned pt and pt's daughter to schedule EUS with no answer.  Left voicemail message [facility] direct contact number for pt/daughter to return call.

## 2025-03-04 NOTE — TELEPHONE ENCOUNTER
Staff contacted pharmacy about the prior authorization and gave them Dr. Clark's information. Pharmacy had an issue with Dr. Clark's KIMBERLY. Pharmacy siad they will work on the prior authorization and try to solve the issue.     Staff contacted pt to update her on the pior authorization.   No

## 2025-03-07 ENCOUNTER — TELEPHONE (OUTPATIENT)
Dept: HEMATOLOGY/ONCOLOGY | Facility: CLINIC | Age: 75
End: 2025-03-07
Payer: MEDICARE

## 2025-03-07 NOTE — TELEPHONE ENCOUNTER
I called the patient to get her scheduled for a missed appt with dr Motley. Both of her phone numbers were disconnected. I called her daughters number. No answer. I left a voicemail with my direct line number for her to call me back to schedule.

## 2025-03-11 ENCOUNTER — TELEPHONE (OUTPATIENT)
Dept: HEMATOLOGY/ONCOLOGY | Facility: CLINIC | Age: 75
End: 2025-03-11
Payer: MEDICARE

## 2025-03-11 ENCOUNTER — DOCUMENTATION ONLY (OUTPATIENT)
Dept: HEMATOLOGY/ONCOLOGY | Facility: CLINIC | Age: 75
End: 2025-03-11
Payer: MEDICARE

## 2025-03-11 NOTE — TELEPHONE ENCOUNTER
I got a call this morning from the patients daughter. She said she was so sorry that she just got all the messages I had been leaving about her mother appts. She said that her mother is now in [facility], and that someone should have called to get her mother rescheduled. I let her know that someone did call patient access and I got the message. That's  when I called the number to find out what was going on because we had no knowledge that she was at [facility]. I let her know that no answered the phone the first time I called. The 2nd time I called it was ringing then the phone just hung up, and I thought there was a mix up with the patients or something because we had no knowledge that she was there. I told her that I would call to get more info from them. That when the daughter Lillian Coats  stated that she doesn't think her mother will come to the appt. That she doesn't want to see any Dr's or get any treatment from anyone.That she is very depressed ext.  The daughter also stated that she didn't know if her mother was saying that because she was mad at her because she bought papers to the [facility] to get admitted. I let her know that I will reach out to someone at [facility] and I will call her back.

## 2025-03-11 NOTE — TELEPHONE ENCOUNTER
I reached out to Alfie Spencer to let her know what the daughter of the patient said in regards to the pt being very depressed and not wanting to see any drs or doing anything health wise. I let her know that the pt is not admitted to [facility], and that I just found out from the daughter. She stated that she will look into it and I asked Alfie to call me back when she found something out.

## 2025-03-11 NOTE — PROGRESS NOTES
ABIGAIL received a call from Dejah Hicks this morning regarding a conversation between Dejah and pt's daughter about pt's lack of desire to go to appointments. ABIGAIL has never met the patient but did some research and found that pt was in [facility] and discharged to [facility], where she currently resides. ABIGAIL spoke to Shania at [facility], who verified that pt was frequently quiet and sometimes unwilling to participate much when she was there. ABIGAIL spoke to [facility] SW, Pari, told Pari of the daughter's concerns brought up to Dejah. Pari said that pt was evaluated last Friday by their Psych NP, pt denies suicidal ideation, but she is depressed at baseline. Pt takes Lexapro. She does not want to get treatment or go to any appointments, but [facility] is addressing the depression piece that has apparently been an issue for a long time. ABIGAIL relayed this to supervisor, Zaida Garza [facility], and agree that pt is under the care of [facility], and the issue is already being handled.    Alfie Spencer, [facility]  Oncology Social Worker  [facility]  [phone]

## 2025-03-20 ENCOUNTER — TELEPHONE (OUTPATIENT)
Dept: ENDOSCOPY | Facility: HOSPITAL | Age: 75
End: 2025-03-20
Payer: MEDICARE

## 2025-03-20 NOTE — TELEPHONE ENCOUNTER
David Hernadez MA [facility] Schedulers  Caller: Unspecified (1 year ago)                  - Repeat the upper endoscopic ultrasound in 1 year                         for surveillance based on pathology results.  Attending Participation:       I personally performed the entire procedure.  Kevin Carballo MD  3/5/2024 11:07:24 AM

## 2025-03-20 NOTE — TELEPHONE ENCOUNTER
Telephoned pt and pt's daughter to schedule EUS with no answer. Left voicemail message [facility] direct contact number for pt/daughter to return call. Portal message and letter sent as final attempt to contact due to multiple attempts to contact for scheduling.            Endoscopy Scheduling Department  [facility] [address] [place] [address]  Take the [facility] Elevators to 2nd Floor Outpatient Surgery      Date: 3/20/25      Medical Record # [id number]      Dear  Ca Barriga    An order for the following procedure(s) Upper Endoscopy Ultrasound (EUS) was placed for you by   Dr. Carballo.    Since multiple attempts have been made to get in touch with you, this is the last notification. Please call the scheduling nurse to schedule this procedure as soon as possible.    If you have already scheduled this appointment, please disregard this letter. If you would like to cancel this request, please call the number listed below.     Sincerely,        Endoscopy Scheduling Department [phone] or [phone]        Comments: Office hours are Monday through Friday 8-430p.

## 2025-03-26 ENCOUNTER — TELEPHONE (OUTPATIENT)
Dept: ENDOSCOPY | Facility: HOSPITAL | Age: 75
End: 2025-03-26
Payer: MEDICARE

## 2025-03-26 DIAGNOSIS — C16.9 GASTRIC ADENOCARCINOMA: Primary | ICD-10-CM

## 2025-03-26 RX ORDER — APIXABAN 5 MG/1
5 TABLET, FILM COATED ORAL 2 TIMES DAILY
COMMUNITY

## 2025-03-26 NOTE — TELEPHONE ENCOUNTER
Upper Endoscopic Ultrasound (EUS) Prep Instructions    Date of procedure: 5/9/25 Arrive at: 10:00 AM    Location of Department:   [facility] [place] [address]  Take the [facility] Elevators to 2nd Floor Outpatient Surgery    How to prep:      Day Before Procedure 5/8/25     You may have a light evening meal.   No solid food after 7:00 pm.   Continue drinking clear liquids.      Day of the Procedure 5/9/25     You may have water/clear liquids until 4 hours before your procedure or as directed by the scheduling nurse 7:00 AM.   See below for list.    What You CANNOT do:   Do not drink milk or anything colored red.  Do not drink alcohol.  No gum chewing or candy morning of procedure    Liquids That Are OK to Drink:   Water  Sports drinks (Gatorade, Power-Aid)  Coffee or tea (no cream or nondairy creamer)  Clear juices without pulp (apple, white grape)  Gelatin desserts (no fruit or toppings)  Clear soda (sprite, coke, ginger ale)  Chicken broth (until 12 midnight the night before procedure)        Comments:      IMPORTANT INFORMATION TO KNOW BEFORE YOUR PROCEDURE    [facility] 2nd Floor         If your procedure requires the administration of anesthesia, it is necessary for a responsible adult to drive you home. (Medical Transportation, Uber, Lyft, Taxi, etc. may ONLY be used if a responsible adult is present to accompany you home.  The responsible adult CAN'T be the  of the service).      person must be available to return to pick you up within 15 minutes of being notified of discharge.       Please bring a picture ID, insurance card, & copayment      Take Medications as directed below:  1) Stop taking Eliquis (apixaban) 2 days (prior to the procedure) on:  5/7/25 (last dose 5/6/25)     If you begin taking any blood thinning medications, injectable weight loss/diabetes medications (other than insulin) , or Adipex (Phentermine) please contact  the endoscopy scheduling department listed below as soon as possible.    If you are diabetic see the attached instruction sheet regarding your medication.     If you take HEART, BLOOD PRESSURE, SEIZURE, PAIN, LUNG (including inhalers/nebulizers), ANTI-REJECTION (transplant patients), or PSYCHIATRIC medications, please take at your regular times with a sip of water or as directed by the scheduling nurse.     Important contact information:    Endoscopy [phone] Hours of operation Monday-Friday 8:00-4:30pm.    Questions about insurance or financial obligations call [phone] or [phone].    If you have questions regarding the prep or need to reschedule, please call [phone]. After hours questions requiring immediate assistance, contact [facility] nurse line at [phone] or [phone].   NOTE:     On occasion, unforeseen circumstances may cause a delay in your procedure start time. We respect your time and appreciate your patience during these circumstances.      Comments:

## 2025-03-26 NOTE — TELEPHONE ENCOUNTER
Received voicemail from Ruddy Reid at [facility] (phone [phone], fax [phone]), regarding scheduling EUS.  Spoke with Franklin and EUS appt held for 5/9/25 at 11am.  Pt's nurse, Rodriguez, is not able to speak at the time to review pt details and instructions.  Direct contact number left with Franklin for pt's nurse to call back to complete scheduling.

## 2025-03-26 NOTE — TELEPHONE ENCOUNTER
Department of Endoscopy      Dear Dr. Agudelo,    Your patient, Ca Coats 1950 is being scheduled for EUS on 5/9/25 and our records indicate they are taking Eliquis (apixaban).    Please indicate if and when the patient can safely stop their medication prior to the procedure by completing one of the following:    Do not discontinue medication: _____    Medication can be discontinued __2___ days prior to the procedure.    Please take into consideration that therapeutic maneuvers may be performed during the procedure that requires delay in restarting anticoagulation therapy.      Signature: ______________________________________________ Date:__________________      Please sign and date this letter and fax it to [phone]. If you have any questions or concerns, please call us at [phone] Monday-Friday, 8:00 am-3:00 pm.    Thank you for your prompt response,    [facility]       Medication                                                                Hold Time                                                                                                                                                        ANTIPLATELETS   Persantine (dipyridamole) 2 days   Aggrenox (ASA/dipyridamole) 2 days     Pletal (cilostazol) 2 days     Plavix (clopidogrel) 5 days     Effient (prasugrel) 5 days     Ticlid (ticlidopine) 10 days     Brilinta (ticagrelor) 3 days     Zontivity (vorapaxar) 5 days   ANTICOAGULANTS   Coumadin (warfarin) 5 days   Lovenox (enoxaparin)  -last dose administered to be 50% of total daily dose 24 hours   Fragmin (dalteparin)   -last dose administered to be 50% of total daily dose 24 hours   Arixtra (fondaparinux) 2 days     Xarelto (rivaroxaban)   2 days     Eliquis (apixaban)   2 days     Savaysa (edoxaban)   2 days     Pradaxa (dabigatran)   2 days     Iprivask (desirudin)   10 hrs

## 2025-03-26 NOTE — TELEPHONE ENCOUNTER
Spoke to PJ Murphy with [facility], to schedule procedure(s) Upper Endoscopy Ultrasound (EUS)       Physician to perform procedure(s) Dr. TARIQ Anaya  Date of Procedure (s) 5/9/25  Arrival Time 10:00 AM  Time of Procedure(s) 11:00 AM   Location of Procedure(s) [place] 2nd Floor  Type of Rx Prep sent to patient: N/A  Instructions provided to patient via Faxed to [facility] at [phone]    Patient was informed on the following information and verbalized understanding. Screening questionnaire reviewed with patient and complete. If procedure requires anesthesia, a responsible adult needs to be present to accompany the patient home, patient cannot drive after receiving anesthesia. Appointment details are tentative, especially check-in time. Patient will receive a prep-op call 7 days prior to confirm check-in time for procedure. If applicable the patient should contact their pharmacy to verify Rx for procedure prep is ready for pick-up. Patient was advised to call the scheduling department at [phone] if pharmacy states no Rx is available. Patient was advised to call the endoscopy scheduling department if any questions or concerns arise.      [facility] Scheduling Department

## 2025-04-22 ENCOUNTER — TELEPHONE (OUTPATIENT)
Dept: INTERNAL MEDICINE | Facility: CLINIC | Age: 75
End: 2025-04-22
Payer: MEDICARE

## 2025-04-22 NOTE — TELEPHONE ENCOUNTER
----- Message from Health  Nataliia sent at 4/22/2025  7:35 AM CDT -----  Regarding: Digital Medicine Patient  Dr. Lei Garcia,Unfortunately, Ms. Ca Coats has failed to meet basic participation requirements for the [facility] Digital Medicine Program and will be discharged from the program. We have tried to contact her on multiple occasions without success. Unfortunately, She is not a good candidate for digital medicine monitoring. Thank you for your support of Digital Medicine! Please contact me if you have any questions or concerns.Sincerely,Nataliia Reina

## 2025-05-09 ENCOUNTER — TELEPHONE (OUTPATIENT)
Dept: ENDOSCOPY | Facility: HOSPITAL | Age: 75
End: 2025-05-09
Payer: MEDICARE

## 2025-05-09 NOTE — TELEPHONE ENCOUNTER
Received message from Endoscopy Lab pt is refusing to come for scheduled EUS today.  Spoke with Ruddy Reid at [facility] (phone [phone], fax [phone]) as well as pt's daughter, Lillian.  Per April, pt is refusing EUS and does not want to reschedule this test at this time.

## 2025-06-11 DIAGNOSIS — G43.909 MIGRAINE: Primary | ICD-10-CM

## 2025-06-11 DIAGNOSIS — Z86.79 HISTORY OF CEREBRAL ANEURYSM: ICD-10-CM

## 2025-06-11 DIAGNOSIS — K59.00 CONSTIPATION: Primary | ICD-10-CM

## 2025-06-11 NOTE — PLAN OF CARE
Pt. On day 4 of outpt. Xrt to stomach.  Pt. With mild intermittent nausea.  Has anti nausea meds.  No vomiting or diarrhea.  In WC.   none

## 2025-07-01 DIAGNOSIS — G43.909 MIGRAINE: Primary | ICD-10-CM

## 2025-07-01 DIAGNOSIS — K59.00 CONSTIPATION: Primary | ICD-10-CM

## 2025-07-01 DIAGNOSIS — Z86.79 HISTORY OF CEREBRAL ANEURYSM: ICD-10-CM

## 2025-08-19 ENCOUNTER — OFFICE VISIT (OUTPATIENT)
Dept: NEUROLOGY | Facility: CLINIC | Age: 75
End: 2025-08-19
Payer: COMMERCIAL

## 2025-08-19 VITALS
BODY MASS INDEX: 52.46 KG/M2 | DIASTOLIC BLOOD PRESSURE: 61 MMHG | HEART RATE: 75 BPM | WEIGHT: 293 LBS | SYSTOLIC BLOOD PRESSURE: 126 MMHG

## 2025-08-19 DIAGNOSIS — I67.1 MULTIPLE ANEURYSMS OF CEREBRAL ARTERY: ICD-10-CM

## 2025-08-19 DIAGNOSIS — Z86.73 HISTORY OF STROKE: ICD-10-CM

## 2025-08-19 DIAGNOSIS — G43.019 INTRACTABLE COMMON MIGRAINE: Primary | ICD-10-CM

## 2025-08-19 PROCEDURE — 99999 PR PBB SHADOW E&M-EST. PATIENT-LVL III: CPT | Mod: PBBFAC,,, | Performed by: PSYCHIATRY & NEUROLOGY

## 2025-08-19 RX ORDER — DULOXETIN HYDROCHLORIDE 20 MG/1
20 CAPSULE, DELAYED RELEASE ORAL DAILY
Qty: 30 CAPSULE | Refills: 11 | Status: SHIPPED | OUTPATIENT
Start: 2025-08-19 | End: 2025-08-19

## 2025-08-19 RX ORDER — FAMOTIDINE 20 MG/1
20 TABLET, FILM COATED ORAL 2 TIMES DAILY
COMMUNITY

## 2025-08-19 RX ORDER — BUTALBITAL, ASPIRIN, AND CAFFEINE 325; 50; 40 MG/1; MG/1; MG/1
1 CAPSULE ORAL EVERY 4 HOURS PRN
COMMUNITY

## 2025-08-19 RX ORDER — NAPROXEN SODIUM 220 MG/1
81 TABLET, FILM COATED ORAL DAILY
COMMUNITY

## 2025-08-19 RX ORDER — DULOXETIN HYDROCHLORIDE 20 MG/1
20 CAPSULE, DELAYED RELEASE ORAL NIGHTLY
Qty: 30 CAPSULE | Refills: 11 | Status: SHIPPED | OUTPATIENT
Start: 2025-08-19 | End: 2026-08-19

## 2025-08-29 ENCOUNTER — HOSPITAL ENCOUNTER (OUTPATIENT)
Dept: RADIOLOGY | Facility: HOSPITAL | Age: 75
Discharge: HOME | End: 2025-08-29
Attending: PSYCHIATRY & NEUROLOGY
Payer: COMMERCIAL

## 2025-08-29 ENCOUNTER — DOCUMENTATION ONLY (OUTPATIENT)
Dept: NEUROLOGY | Facility: CLINIC | Age: 75
End: 2025-08-29
Payer: COMMERCIAL

## (undated) DEVICE — SOL NACL IRR 3000ML

## (undated) DEVICE — BIT DRILL WIRE PASS 1.0MM

## (undated) DEVICE — GLOVE SURGEON SYN PF SZ 9

## (undated) DEVICE — DRAPE STERI-DRAPE 1000 17X11IN

## (undated) DEVICE — GLOVE BIOGEL SKINSENSE PI 7.5

## (undated) DEVICE — TRAY CATH 1-LYR URIMTR 16FR

## (undated) DEVICE — GLOVE BIOGEL SKINSENSE PI 8.5

## (undated) DEVICE — Device

## (undated) DEVICE — FIBRILLAR ABS HEMOSTAT 4X4

## (undated) DEVICE — ADHESIVE DERMABOND ADVANCED

## (undated) DEVICE — BANDAGE ADHESIVE

## (undated) DEVICE — DRESSING LEUKOPLAST FLEX 1X3IN

## (undated) DEVICE — CARTRIDGE OIL

## (undated) DEVICE — SPONGE PATTY SURGICAL .5X3IN

## (undated) DEVICE — HEMOSTAT SURGICEL NU-KNIT 6X9

## (undated) DEVICE — SET MICPUNC ACC STIFF CANNULA

## (undated) DEVICE — DRESSING GAUZE XEROFORM 5X9

## (undated) DEVICE — SEAL LENS SCOPE MYOSURE

## (undated) DEVICE — KIT PROBE COVER WITH GEL

## (undated) DEVICE — BUR ELITE ROUND DIAMOND 2.0MM

## (undated) DEVICE — GUIDEWIRE LAUREATE .035X260CM

## (undated) DEVICE — CLOSURE SKIN STERI STRIP 1/2X4

## (undated) DEVICE — KIT CUSTOM MANIFOLD

## (undated) DEVICE — KIT EVACUATOR 3-SPRING 1/8 DRN

## (undated) DEVICE — WIRE GUIDE SAFE-T-J .035 260CM

## (undated) DEVICE — DRAPE INCISE IOBAN 2 23X17IN

## (undated) DEVICE — DRAPE STERI INSTRUMENT 1018

## (undated) DEVICE — PACK ECLIPSE SET-UP W/O DRAPE

## (undated) DEVICE — DRAPE TOP 53X102IN

## (undated) DEVICE — GLOVE SENSICARE PI SURG 6.5

## (undated) DEVICE — STOCKINET 4INX48

## (undated) DEVICE — DEVICE MYOSURE REACH

## (undated) DEVICE — PENCIL ROCKER SWITCH 10FT CORD

## (undated) DEVICE — DRESSING SURGICAL 1/2X1/2

## (undated) DEVICE — SEE MEDLINE ITEM 156918

## (undated) DEVICE — CLIPS RANEY SCALP FFS/ASSY

## (undated) DEVICE — SUT 3-0 VICRYL / SH (J416)

## (undated) DEVICE — SUT 4/0 18IN NUROLON BLK B

## (undated) DEVICE — SPIKE CONTRAST CONTROLLER

## (undated) DEVICE — DRAPE CORETEMP FLD WRM 56X62IN

## (undated) DEVICE — KIT SURGIFLO HEMOSTATIC MATRIX

## (undated) DEVICE — KIT GLIDESHEATH SLEND 6FR 10CM

## (undated) DEVICE — COVER PROXIMA MAYO STAND

## (undated) DEVICE — MARKER SKIN RULER STERILE

## (undated) DEVICE — CORD BIPOLAR 12 FOOT

## (undated) DEVICE — PACK FLUENT DISPOSABLE

## (undated) DEVICE — SPONGE NEURO 1/4X1/4

## (undated) DEVICE — HOOK LONE STAR BLUNT 12MM

## (undated) DEVICE — SYR SLIP TIP 1CC

## (undated) DEVICE — DRAPE OPMI STERILE

## (undated) DEVICE — SEE MEDLINE ITEM 157187

## (undated) DEVICE — ROUTER TAPERED 2.3MM

## (undated) DEVICE — BURR ROUND DMND CRS ELT 4.0MM

## (undated) DEVICE — SOL POVIDONE SCRUB IODINE 4 OZ

## (undated) DEVICE — TUBE FRAZIER 5MM 2FT SOFT TIP

## (undated) DEVICE — PAD CUSTOM COTTON 2 X 2

## (undated) DEVICE — PROTECTION STATION PLUS

## (undated) DEVICE — OMNIPAQUE 350 200ML

## (undated) DEVICE — STAPLER SKIN PROXIMATE WIDE

## (undated) DEVICE — TRAY NEURO OMC

## (undated) DEVICE — RUBBERBAND STERILE 3X1/8IN

## (undated) DEVICE — DRAPE THYROID WITH ARMBOARD

## (undated) DEVICE — DRAPE CRANIOTOMY T SURG STRL

## (undated) DEVICE — SCALPEL #15 BLADE STRL DISP.

## (undated) DEVICE — SOL POVIDONE PREP IODINE 4 OZ

## (undated) DEVICE — TUBING SUC UNIV W/CONN 12FT

## (undated) DEVICE — DRAPE C-ARM ELAS CLIP 42X120IN

## (undated) DEVICE — DIFFUSER

## (undated) DEVICE — DRESSING SURGICAL 1X3

## (undated) DEVICE — PAD CURAD NONADH 3X4IN

## (undated) DEVICE — HEMOSTAT SURGICEL 4X8IN

## (undated) DEVICE — GLOVE SENSICARE PI GRN 6.5

## (undated) DEVICE — DRESSING SURGICAL 3/4X3/4

## (undated) DEVICE — CHLORAPREP W TINT 26ML APPL

## (undated) DEVICE — SEE MEDLINE ITEM 156894

## (undated) DEVICE — BAG SNAP KOVER BAND 36 X 28 IN

## (undated) DEVICE — TRAY PORT IR PLACEMENT REMOVAL

## (undated) DEVICE — DRESSING TRANS 2X2 TEGADERM

## (undated) DEVICE — DRESSING MEPORE ISLAND 31/2X4

## (undated) DEVICE — ELECTRODE REM PLYHSV RETURN 9

## (undated) DEVICE — BURR ROUND DIAMOND 4.0MM

## (undated) DEVICE — SPONGE GAUZE 16PLY 4X4

## (undated) DEVICE — BLADE SLIGHTLY CURVED 1MM

## (undated) DEVICE — CATH JACKY RADIAL 5FR 100CM

## (undated) DEVICE — HEMOSTAT VASC BAND REG 24CM

## (undated) DEVICE — SUT CTD VICRYL 2-0 CR/CT-2

## (undated) DEVICE — PROBE MICRO DOPPLER 20MHZ